# Patient Record
Sex: MALE | Race: WHITE | NOT HISPANIC OR LATINO | Employment: OTHER | URBAN - METROPOLITAN AREA
[De-identification: names, ages, dates, MRNs, and addresses within clinical notes are randomized per-mention and may not be internally consistent; named-entity substitution may affect disease eponyms.]

---

## 2017-01-05 ENCOUNTER — TELEPHONE (OUTPATIENT)
Dept: INTERNAL MEDICINE | Facility: CLINIC | Age: 62
End: 2017-01-05

## 2017-01-05 DIAGNOSIS — Z79.01 LONG TERM CURRENT USE OF ANTICOAGULANT THERAPY: ICD-10-CM

## 2017-01-05 DIAGNOSIS — Z95.2 AORTIC VALVE PROSTHESIS PRESENT: Primary | ICD-10-CM

## 2017-01-05 NOTE — TELEPHONE ENCOUNTER
Will likely require Lovenox bridging. This would require coordination with the anticoagulation nurse. Will forward note to them.   If he has Lovenox bridging, okay to hold warfarin 4-5 days prior to procedure. Needs to hold Lovenox AM of procedure.   Dose of Lovenox would be 120 mg sq q12 hrs.   Please advise pt.

## 2017-01-05 NOTE — TELEPHONE ENCOUNTER
Pt calls, he needs to have a piece of skin from the roof of his mouth on 1/20/17 by dentist (using local anesthetic) and asking if he should be stopping his Coumadin prior to this and if he would need to bridge with Lovenox.      Pt also was started on Amoxicillin 500mg TID by dentist for 7 days on 12/30/16 and wanting to know if he needs to be making any adjustments to Coumadin. His next INR appt is on 1/19/17.

## 2017-01-06 ENCOUNTER — TELEPHONE (OUTPATIENT)
Dept: NURSING | Facility: CLINIC | Age: 62
End: 2017-01-06

## 2017-01-09 DIAGNOSIS — E78.5 HYPERLIPIDEMIA LDL GOAL <130: Primary | ICD-10-CM

## 2017-01-09 DIAGNOSIS — E03.9 ACQUIRED HYPOTHYROIDISM: ICD-10-CM

## 2017-01-09 NOTE — TELEPHONE ENCOUNTER
Simvastatin     Last Written Prescription Date: 01/08/16  Last Fill Quantity: 90, # refills: 3  Last Office Visit with [facility] or [facility] prescribing provider: 11/14/16       CHOL      172   1/8/2016  HDL       48   1/8/2016  LDL       94   1/8/2016  TRIG      148   1/8/2016  CHOLHDLRATIO      4.0   12/24/2014    Labs showing if normal/abnormal  Lab Results   Component Value Date    CHOL 172 01/08/2016    TRIG 148 01/08/2016    HDL 48 01/08/2016    LDL 94 01/08/2016    VLDL 47* 12/24/2014    CHOLHDLRATIO 4.0 12/24/2014     Levothyroxine     Last Written Prescription Date: 01/08/16  Last Quantity: 90, # refills: 3  Last Office Visit with [facility], [facility] or [facility] prescribing provider: 11/14/16        TSH   Date Value Ref Range Status   01/08/2016 2.98 0.40 - 4.00 mU/L Final

## 2017-01-11 RX ORDER — SIMVASTATIN 40 MG
40 TABLET ORAL AT BEDTIME
Qty: 90 TABLET | Refills: 0 | Status: SHIPPED | OUTPATIENT
Start: 2017-01-11 | End: 2017-04-28

## 2017-01-11 RX ORDER — LEVOTHYROXINE SODIUM 150 UG/1
150 TABLET ORAL DAILY
Qty: 90 TABLET | Refills: 0 | Status: SHIPPED | OUTPATIENT
Start: 2017-01-11 | End: 2017-04-15

## 2017-01-11 NOTE — TELEPHONE ENCOUNTER
Medication is being filled for 1 time refill only due to:  Patient needs to be seen because physical due.   Amanda Wheeler RN

## 2017-01-13 ENCOUNTER — ANTICOAGULATION THERAPY VISIT (OUTPATIENT)
Dept: ANTICOAGULATION | Facility: CLINIC | Age: 62
End: 2017-01-13
Payer: COMMERCIAL

## 2017-01-13 DIAGNOSIS — I48.91 ATRIAL FIBRILLATION (H): ICD-10-CM

## 2017-01-13 DIAGNOSIS — Z95.2 AORTIC VALVE PROSTHESIS PRESENT: ICD-10-CM

## 2017-01-13 DIAGNOSIS — Z79.01 LONG-TERM (CURRENT) USE OF ANTICOAGULANTS: Primary | ICD-10-CM

## 2017-01-13 LAB — INR POINT OF CARE: 3.1 (ref 0.86–1.14)

## 2017-01-13 PROCEDURE — 36416 COLLJ CAPILLARY BLOOD SPEC: CPT

## 2017-01-13 PROCEDURE — 85610 PROTHROMBIN TIME: CPT | Mod: QW

## 2017-01-13 PROCEDURE — 99207 ZZC NO CHARGE NURSE ONLY: CPT

## 2017-01-13 NOTE — PROGRESS NOTES
ANTICOAGULATION FOLLOW-UP CLINIC VISIT    Patient Name:  Todd S Aschoff  Date:  1/13/2017  Contact Type:  Face to Face    SUBJECTIVE:     Patient Findings     Positives Medication Changes (antibiotic started on Wednesday this week for tooth infection.)    Comments Procedure 1/20/17 on the roof of his mouth.  Will need to hold warfarin for 4 days prior and use Lovenox bridging.  No lovenox the morning of the procedure.  Needs INR checked 1/19/17 per surgeon.               OBJECTIVE    INR PROTIME   Date Value Ref Range Status   01/13/2017 3.1* 0.86 - 1.14 Final       ASSESSMENT / PLAN  INR assessment THER    Recheck INR In: 6 DAYS    INR Location Clinic      Anticoagulation Summary as of 1/13/2017     INR goal 2.5-3.5   Selected INR 3.1 (1/13/2017)   Maintenance plan 7.5 mg (5 mg x 1.5) on Wed; 5 mg (5 mg x 1) all other days   Full instructions 1/16: Hold; 1/17: Hold; 1/18: Hold; Otherwise 7.5 mg on Wed; 5 mg all other days   Weekly total 37.5 mg   Plan last modified Amanda Wheeler RN (6/15/2016)   Next INR check 1/26/2017   Priority INR   Target end date     Indications   Long-term (current) use of anticoagulants [Z79.01] [Z79.01]  Aortic valve prosthesis present [Z95.2]  Atrial fibrillation (H) [I48.91]         Anticoagulation Episode Summary     INR check location     Preferred lab     Send INR reminders to [facility]    Comments       Anticoagulation Care Providers     Provider Role Specialty Phone number    Obdulio Ingram MD [facility] Internal [facility] [phone]            See the Encounter Report to view Anticoagulation Flowsheet and Dosing Calendar (Go to Encounters tab in chart review, and find the Anticoagulation Therapy Visit)    Dosage adjustment made based on physician directed care plan.    Amanda Wheeler, RN

## 2017-01-18 ENCOUNTER — HOSPITAL ENCOUNTER (EMERGENCY)
Facility: CLINIC | Age: 62
Discharge: HOME | End: 2017-01-18
Attending: EMERGENCY MEDICINE | Admitting: EMERGENCY MEDICINE
Payer: COMMERCIAL

## 2017-01-18 ENCOUNTER — TELEPHONE (OUTPATIENT)
Dept: ANTICOAGULATION | Facility: CLINIC | Age: 62
End: 2017-01-18

## 2017-01-18 VITALS
RESPIRATION RATE: 18 BRPM | TEMPERATURE: 98.8 F | OXYGEN SATURATION: 97 % | BODY MASS INDEX: 40.69 KG/M2 | DIASTOLIC BLOOD PRESSURE: 84 MMHG | HEIGHT: 73 IN | WEIGHT: 307 LBS | SYSTOLIC BLOOD PRESSURE: 114 MMHG

## 2017-01-18 DIAGNOSIS — N30.01 ACUTE CYSTITIS WITH HEMATURIA: ICD-10-CM

## 2017-01-18 LAB
ALBUMIN UR-MCNC: 100 MG/DL
ANION GAP SERPL CALCULATED.3IONS-SCNC: 6 MMOL/L (ref 3–14)
APPEARANCE UR: ABNORMAL
BASOPHILS # BLD AUTO: 0 10E9/L (ref 0–0.2)
BASOPHILS NFR BLD AUTO: 0.2 %
BILIRUB UR QL STRIP: NEGATIVE
BUN SERPL-MCNC: 15 MG/DL (ref 7–30)
CALCIUM SERPL-MCNC: 8.3 MG/DL (ref 8.5–10.1)
CHLORIDE SERPL-SCNC: 106 MMOL/L (ref 94–109)
CO2 SERPL-SCNC: 30 MMOL/L (ref 20–32)
COLOR UR AUTO: ABNORMAL
CREAT SERPL-MCNC: 1.02 MG/DL (ref 0.66–1.25)
DIFFERENTIAL METHOD BLD: ABNORMAL
EOSINOPHIL # BLD AUTO: 0.1 10E9/L (ref 0–0.7)
EOSINOPHIL NFR BLD AUTO: 0.4 %
ERYTHROCYTE [DISTWIDTH] IN BLOOD BY AUTOMATED COUNT: 13.3 % (ref 10–15)
GFR SERPL CREATININE-BSD FRML MDRD: 74 ML/MIN/1.7M2
GLUCOSE SERPL-MCNC: 94 MG/DL (ref 70–99)
GLUCOSE UR STRIP-MCNC: NEGATIVE MG/DL
HCT VFR BLD AUTO: 43.5 % (ref 40–53)
HGB BLD-MCNC: 15.2 G/DL (ref 13.3–17.7)
HGB UR QL STRIP: ABNORMAL
IMM GRANULOCYTES # BLD: 0 10E9/L (ref 0–0.4)
IMM GRANULOCYTES NFR BLD: 0.2 %
INR PPP: 1.75 (ref 0.86–1.14)
KETONES UR STRIP-MCNC: 5 MG/DL
LEUKOCYTE ESTERASE UR QL STRIP: ABNORMAL
LYMPHOCYTES # BLD AUTO: 1.1 10E9/L (ref 0.8–5.3)
LYMPHOCYTES NFR BLD AUTO: 8.9 %
MCH RBC QN AUTO: 32.5 PG (ref 26.5–33)
MCHC RBC AUTO-ENTMCNC: 34.9 G/DL (ref 31.5–36.5)
MCV RBC AUTO: 93 FL (ref 78–100)
MONOCYTES # BLD AUTO: 0.9 10E9/L (ref 0–1.3)
MONOCYTES NFR BLD AUTO: 7.3 %
NEUTROPHILS # BLD AUTO: 10.4 10E9/L (ref 1.6–8.3)
NEUTROPHILS NFR BLD AUTO: 83 %
NITRATE UR QL: NEGATIVE
NRBC # BLD AUTO: 0 10*3/UL
NRBC BLD AUTO-RTO: 0 /100
PH UR STRIP: 6 PH (ref 5–7)
PLATELET # BLD AUTO: 182 10E9/L (ref 150–450)
POTASSIUM SERPL-SCNC: 4 MMOL/L (ref 3.4–5.3)
RBC # BLD AUTO: 4.68 10E12/L (ref 4.4–5.9)
RBC #/AREA URNS AUTO: >182 /HPF (ref 0–2)
SODIUM SERPL-SCNC: 142 MMOL/L (ref 133–144)
SP GR UR STRIP: >1.05 (ref 1–1.03)
URN SPEC COLLECT METH UR: ABNORMAL
UROBILINOGEN UR STRIP-MCNC: NORMAL MG/DL (ref 0–2)
WBC # BLD AUTO: 12.5 10E9/L (ref 4–11)
WBC #/AREA URNS AUTO: >182 /HPF (ref 0–2)

## 2017-01-18 PROCEDURE — 87186 SC STD MICRODIL/AGAR DIL: CPT | Performed by: EMERGENCY MEDICINE

## 2017-01-18 PROCEDURE — 85025 COMPLETE CBC W/AUTO DIFF WBC: CPT | Performed by: EMERGENCY MEDICINE

## 2017-01-18 PROCEDURE — 85610 PROTHROMBIN TIME: CPT | Performed by: EMERGENCY MEDICINE

## 2017-01-18 PROCEDURE — 99284 EMERGENCY DEPT VISIT MOD MDM: CPT | Mod: 25

## 2017-01-18 PROCEDURE — 25000125 ZZHC RX 250: Performed by: EMERGENCY MEDICINE

## 2017-01-18 PROCEDURE — 87088 URINE BACTERIA CULTURE: CPT | Performed by: EMERGENCY MEDICINE

## 2017-01-18 PROCEDURE — 87086 URINE CULTURE/COLONY COUNT: CPT | Performed by: EMERGENCY MEDICINE

## 2017-01-18 PROCEDURE — 81003 URINALYSIS AUTO W/O SCOPE: CPT | Performed by: EMERGENCY MEDICINE

## 2017-01-18 PROCEDURE — 96365 THER/PROPH/DIAG IV INF INIT: CPT

## 2017-01-18 PROCEDURE — 80048 BASIC METABOLIC PNL TOTAL CA: CPT | Performed by: EMERGENCY MEDICINE

## 2017-01-18 RX ORDER — CEFTRIAXONE 1 G/1
1 INJECTION, POWDER, FOR SOLUTION INTRAMUSCULAR; INTRAVENOUS ONCE
Status: COMPLETED | OUTPATIENT
Start: 2017-01-18 | End: 2017-01-18

## 2017-01-18 RX ORDER — CEFUROXIME AXETIL 500 MG/1
500 TABLET ORAL 2 TIMES DAILY
Qty: 20 TABLET | Refills: 0 | Status: SHIPPED | OUTPATIENT
Start: 2017-01-18 | End: 2017-01-25

## 2017-01-18 RX ADMIN — CEFTRIAXONE 1 G: 1 INJECTION, POWDER, FOR SOLUTION INTRAMUSCULAR; INTRAVENOUS at 07:58

## 2017-01-18 ASSESSMENT — ENCOUNTER SYMPTOMS
CHILLS: 0
BLOOD IN STOOL: 0
FEVER: 0
FREQUENCY: 1
DYSURIA: 1
DIARRHEA: 0
NAUSEA: 1
HEMATURIA: 1

## 2017-01-18 NOTE — TELEPHONE ENCOUNTER
Pt calls, was at ED yesterday, given Ceftin for UTI.     1) Pt was taking amoxacillin for a tooth infection and is wondering if he should stop the Amoxacillin.    2) Pt has a procedure schedule for Friday to remove a growth from the roof of his mouth, was told it will bleed. Pt is asking if PCP thinks he should reschedule this procedure d/t current UTI? Pt has stopped Warfarin and taking Lovenox.     Please advise.

## 2017-01-18 NOTE — DISCHARGE INSTRUCTIONS
"  Blood in the Urine    Blood in the urine (\"hematuria\") has many possible causes. If it occurs after an injury (such as a car accident or fall), it is most often a sign of bruising to the kidney or bladder. Common medical causes of blood in the urine include urinary tract infection, kidney stone, inflammation, tumors, or certain other diseases of the kidney or bladder. Menstruation can cause blood to appear in the urine sample, although it is not coming from the urinary tract.  If only a trace amount of blood is present, it will show up on the urine test, even though the urine may be yellow and not pink or red. This may occur with any of the above conditions, as well as heavy exercise or high fever. In this case, your doctor may want to repeat the urine test on another day. This will show if the blood is still present. If so, then other tests can be done to find out the cause.  Home care  Follow these home care guidelines:    If your urine does not appear bloody (pink, brown or red) then you do not need to restrict your activity in any way.    If you can see blood in your urine, rest and avoid heavy exertion until your next exam. Do not use aspirin or anti-inflammatory medicine like ibuprofen (Motrin, Advil) or naproxen (Naprosyn, Aleve). These thin the blood and may increase bleeding.  Follow-up care  Follow up with your doctor or as advised by our staff. If you were injured and had blood in your urine, you should have a repeat urine test in 1 to 2 days. Contact your doctor or return to this facility for this test.  A radiologist will review any X-rays that were taken. We will notify you of any new findings that may affect your care.  When to seek medical care  Get prompt medical care if any of the following occur:    Bright red blood or blood clots in the urine (if a new symptom)    Weakness, dizziness or fainting    New groin, abdominal or back pain    Fever of 100.4 F (38 C) or higher, or as directed by your " health care provider    Repeated vomiting    Bleeding from nose, gums or easy bruising    3631-6837 [employer]. [address] [place] [id number]. All rights reserved. This information is not intended as a substitute for professional medical care. Always follow your healthcare professional's instructions.          Understanding Urinary Tract Infections (UTIs)  Most UTIs are caused by bacteria, although they may also be caused by viruses or fungi. Bacteria from the bowel are the most common source of infection. The infection may begin because of any of the following:    Sexual activity. During sex, germs can travel from the penis, vagina, or rectum into the urethra.     Germs on the skin outside the rectum may travel into the urethra. This is more common in women since the rectum and urethra are closer to each other than in men. Wiping from front to back after using the toilet and keeping the area clean can help prevent germs from getting to the urethra.    Blockage of urine flow through the urinary tract. If urine sits too long, germs may begin to grow out of control.      Parts of the urinary tract  The infection can occur in any part of the urinary tract.    The kidneys collect and store urine.    The ureters carry urine from the kidneys to the bladder.    The bladder holds urine until you are ready to let it out.    The urethra carries urine from the bladder out of the body. It is shorter in women, so bacteria can move through it more easily. The urethra is longer in men, so a UTI is less likely to reach the bladder or kidneys in men.    2973-6205 [employer]. [address] [place] [id number]. All rights reserved. This information is not intended as a substitute for professional medical care. Always follow your healthcare professional's instructions.

## 2017-01-18 NOTE — ED NOTES
Pt c/o urinary frequency started yesterday noticed blood in urine tonight. Reported to EMS that he has pain in his penis and he felt dizzy.  EMS gave 100mcg of Fentanyl and 4mg zofran. Pt stopped coumadin on Sunday and started lovenox 135mg BID for up coming dental procedure

## 2017-01-18 NOTE — ED NOTES
DR Spear reports that he would like the Pt's three way alfaro transitioned to a leg bag and this has been done. The Pt has been provided with education re: the use of his leg bag.

## 2017-01-18 NOTE — ED PROVIDER NOTES
History     Chief Complaint:  Urinary Frequency    HPI   Todd S Aschoff is a 61 year old male with history of BPH and atrial fibrillation currently on Lovenox who presents by EMS to the emergency department today for evaluation of urinary frequency, blood in urine, and penile pain with urination. He also complains of nausea. Patient notes that pain began last night, but blood was first noted this morning. He notes a history of bladder infections and currently follows with Dr Gonzales from urology. EMS administered 100 mcg of Fentanyl and 4mg Zofran. Patient recently Stopped coumadin on 01/15/2017 and started Lovenox 135mg BID for up coming dental procedure. Patient also reports recent dental infections and was recently taking Amoxicillin for this. He reports no fever, chills, or bowel movement symptoms. No other concerns were voiced at this time.     Allergies:  No Known Drug Allergies      Medications:    Zocor  Synthroid  Enoxaparin  Valium  Aricept  Coumadin  Flexeril  Lyrica  Zyrtec  Midrin  Myrbetriq  Senna-docusate  Desyrel  Rythmol  Effexor  Tenex  Astelin     Past Medical History:    Obesity  Headache  Allergy  Hypothyroidism  Atrial fibrillation  Sciatica  Hyperlipidemia  OA  Major depression  Alcohol dependence  BPH  Heart murmur  Coagulation disorder  Blood transfusion  Low back pain  Chronic pain    Past Surgical History:    TAA with graft  R hip replacement  Hip arthroplasty  Tonsillectomy  Decompression fusion cervical   Fusion cervical anterior  Decompression lumbar on level  Colonoscopy      Family History:    Father - MI, diabetes  Mother - cancer  Brother - HTN, Sleep apnea.    Sister - HTN    Social History:  The patient was accompanied to the ED by no one.  Smoking Status: Negative   Alcohol Use: Negative  Marital Status:   [2]     Review of Systems   Constitutional: Negative for fever and chills.   Gastrointestinal: Positive for nausea. Negative for diarrhea and blood in stool.  "  Genitourinary: Positive for dysuria, frequency, hematuria and penile pain.   All other systems reviewed and are negative.    Physical Exam     Patient Vitals for the past 24 hrs:   BP Temp Temp src Heart Rate Resp SpO2 Height Weight   01/18/17 0830 141/88 mmHg - - 73 18 95 % - -   01/18/17 0800 (!) 136/100 mmHg - - - - 92 % - -   01/18/17 0730 (!) 132/92 mmHg - - - - 97 % - -   01/18/17 0720 (!) 130/100 mmHg - - 70 18 95 % - -   01/18/17 0700 (!) 136/91 mmHg - - 70 18 96 % - -   01/18/17 0640 (!) 126/92 mmHg - - - - 95 % - -   01/18/17 0600 111/72 mmHg - - - - 97 % - -   01/18/17 0542 122/84 mmHg 98.8  F (37.1  C) Oral 92 20 91 % 1.854 m (6' 1\") (!) 139.254 kg (307 lb)      Physical Exam  Constitutional: Heavy set white male supine.  HENT: No signs of trauma.   Eyes: EOM are normal. Pupils are equal, round, and reactive to light.   Neck: Normal range of motion. No JVD present. No tracheal deviation present. No cervical adenopathy.  Cardiovascular: Regular rhythm.  Exam reveals no gallop and no friction rub. 1/6 early systolic murmur right sternal border with mechanical valve click  Pulmonary/Chest: Bilateral breath sounds normal. No wheezes, rhonchi or rales.   Abdominal: Soft. No tenderness. No rebound or guarding. 2 + femoral pulse.  : Normal penis. Bilateral descended testes, 3 way alfaro cath in place.   Musculoskeletal: No edema. No tenderness.   Lymphadenopathy: No lymphadenopathy.   Neurological: Alert and oriented to person, place, and time. Normal strength. Coordination normal.   Skin: Skin is warm and dry. No rash noted. No erythema.     Emergency Department Course     Laboratory:  Laboratory findings were communicated with the patient who voiced understanding of the findings.  CBC: WBC 12.5, HGB 15.2,    BMP: Calcium: 8.3, Creatinine 1.02  UA: Urine ketone: 5, Specific gravity urine: >1.050, Urine Blood: Large, Protein Albumin urine: 100, Leukocyte Esterase urine: Large, WBC: >182, RBC: " >182  INR: 1.75    Urine culture: Pending    Interventions:  0846 Rocephin 1 g in NS IV      Emergency Department Course:  Nursing notes and vitals reviewed.  I performed an exam of the patient as documented above.   IV was inserted and blood was drawn for laboratory testing, results above.    The patient provided a urine sample here in the emergency department. This was sent for laboratory testing, findings above.    0826: I spoke with Dr. Farfan of the urology service regarding patient's presentation, findings, and plan of care.  At 0840 the patient was rechecked and updated on urology recommendations    I discussed the treatment plan with the patient. They expressed understanding of this plan and consented to discharge. They will be discharged home with instructions for care and follow up. In addition, the patient will return to the emergency department if their symptoms persist, worsen, if new symptoms arise or if there is any concern.  All questions were answered.    I personally reviewed the laboratory results with the Patient and answered all related questions prior to discharge.    Impression & Plan      Medical Decision Making:  Todd S Aschoff is a 61 year old male who is currently on Lovenox with Atrial fibrillation and an aortic valve replacement, and he will have dental work so he is held on his Coumadin and put on Lovenox. He is also on Amoxicillin for dental problems. Last night he began having pain in his penis and having gross blood. Patient states that he does have a history of BPH and previous cystitis. On exam, he has a three way catheter in and he has been irrigated to clear. Urine shows a >182 WBC count, and is being cultured, is given IV Rocephin, and is started on Ceftin. I have contacted Dr Farfan on call for Dr Gonzales, who will leave the catheter in, begin the patient on Ceftin, and have him make a follow up with urology later this week. If he has increased pain, blood that causes  obstruction, or weakness he should return to the ED.    Diagnosis:    ICD-10-CM    1. Acute cystitis with hematuria N30.01      Disposition:   Discharge home; plan for follow up with Obdulio Ingram    Discharge Medications:  New Prescriptions    CEFUROXIME (CEFTIN) 500 MG TABLET    Take 1 tablet (500 mg) by mouth 2 times daily       Scribe Disclosure:  Nic DUARTE, am serving as a scribe at 7:08 AM on 1/18/2017 to document services personally performed by Carlo Spear MD, based on my observations and the provider's statements to me.     EMERGENCY DEPARTMENT        Carlo Spear MD  01/18/17 1105

## 2017-01-18 NOTE — TELEPHONE ENCOUNTER
May d/c amoxicillin and take complete course of Ceftin.   Okay to proceed with procedure on Friday as long as UTI symptoms have resolved (still needs to take complete course of Ceftin though).     Please advise pt.

## 2017-01-18 NOTE — ED AVS SNAPSHOT
"  Emergency Department    [address] [facility] [address]    Phone:  [phone]    Fax:  [phone]                                       Todd S Aschoff   MRN: [id number]    Department:   Emergency Department   Date of Visit:  1/18/2017           Patient Information     Date Of Birth          1955        Your diagnoses for this visit were:     Acute cystitis with hematuria        You were seen by Toan Calzada MD and Carlo Spear MD.      Follow-up Information     Schedule an appointment as soon as possible for a visit with Geraldo Gonzales MD.    Specialty:  Urology    Contact information:    [facility] UROLOGY  [address] [address] [phone]          Discharge Instructions         Blood in the Urine    Blood in the urine (\"hematuria\") has many possible causes. If it occurs after an injury (such as a car accident or fall), it is most often a sign of bruising to the kidney or bladder. Common medical causes of blood in the urine include urinary tract infection, kidney stone, inflammation, tumors, or certain other diseases of the kidney or bladder. Menstruation can cause blood to appear in the urine sample, although it is not coming from the urinary tract.  If only a trace amount of blood is present, it will show up on the urine test, even though the urine may be yellow and not pink or red. This may occur with any of the above conditions, as well as heavy exercise or high fever. In this case, your doctor may want to repeat the urine test on another day. This will show if the blood is still present. If so, then other tests can be done to find out the cause.  Home care  Follow these home care guidelines:    If your urine does not appear bloody (pink, brown or red) then you do not need to restrict your activity in any way.    If you can see blood in your urine, rest and avoid heavy exertion until your next exam. Do not use aspirin or " anti-inflammatory medicine like ibuprofen (Motrin, Advil) or naproxen (Naprosyn, Aleve). These thin the blood and may increase bleeding.  Follow-up care  Follow up with your doctor or as advised by our staff. If you were injured and had blood in your urine, you should have a repeat urine test in 1 to 2 days. Contact your doctor or return to this facility for this test.  A radiologist will review any X-rays that were taken. We will notify you of any new findings that may affect your care.  When to seek medical care  Get prompt medical care if any of the following occur:    Bright red blood or blood clots in the urine (if a new symptom)    Weakness, dizziness or fainting    New groin, abdominal or back pain    Fever of 100.4 F (38 C) or higher, or as directed by your health care provider    Repeated vomiting    Bleeding from nose, gums or easy bruising    3923-7694 [employer]. [address] [place] [address]. All rights reserved. This information is not intended as a substitute for professional medical care. Always follow your healthcare professional's instructions.          Understanding Urinary Tract Infections (UTIs)  Most UTIs are caused by bacteria, although they may also be caused by viruses or fungi. Bacteria from the bowel are the most common source of infection. The infection may begin because of any of the following:    Sexual activity. During sex, germs can travel from the penis, vagina, or rectum into the urethra.     Germs on the skin outside the rectum may travel into the urethra. This is more common in women since the rectum and urethra are closer to each other than in men. Wiping from front to back after using the toilet and keeping the area clean can help prevent germs from getting to the urethra.    Blockage of urine flow through the urinary tract. If urine sits too long, germs may begin to grow out of control.      Parts of the urinary tract  The infection can occur in any  part of the urinary tract.    The kidneys collect and store urine.    The ureters carry urine from the kidneys to the bladder.    The bladder holds urine until you are ready to let it out.    The urethra carries urine from the bladder out of the body. It is shorter in women, so bacteria can move through it more easily. The urethra is longer in men, so a UTI is less likely to reach the bladder or kidneys in men.    1555-0505 [employer]. [address] [place] [address], [place] [address]. All rights reserved. This information is not intended as a substitute for professional medical care. Always follow your healthcare professional's instructions.          Future Appointments        Provider Department Dept Phone Center    1/19/2017 10:30 AM [facility] [phone] [place]    1/25/2017 9:10 AM Geraldo Gonzales MD [facility] Urology [facility] [phone] [facility] [place]      24 Hour Appointment Hotline       To make an appointment at any [facility], call [phone] ([phone]). If you don't have a family doctor or clinic, we will help you find one. [facility] are conveniently located to serve the needs of you and your family.             Review of your medicines      START taking        Dose / Directions Last dose taken    cefUROXime 500 MG tablet   Commonly known as:  CEFTIN   Dose:  500 mg   Quantity:  20 tablet        Take 1 tablet (500 mg) by mouth 2 times daily   Refills:  0          Our records show that you are taking the medicines listed below. If these are incorrect, please call your family doctor or clinic.        Dose / Directions Last dose taken    acetaminophen 650 MG 8 hour tablet   Commonly known as:  ACETAMINOPHEN 8 HOUR   Dose:  650 mg   Quantity:  250 tablet        Take 650 mg by mouth 2 times daily   Refills:  3        azelastine 0.1 % spray   Commonly known as:  ASTELIN   Dose:  2 spray   Quantity:  1 Bottle         Spray 2 sprays in nostril 2 times daily as needed   Refills:  11        cetirizine 10 MG tablet   Commonly known as:  zyrTEC   Dose:  10 mg   Quantity:  90 tablet        Take 1 tablet (10 mg) by mouth daily as needed   Refills:  3        cyclobenzaprine 10 MG tablet   Commonly known as:  FLEXERIL   Dose:  10 mg   Quantity:  270 tablet        Take 1 tablet (10 mg) by mouth 3 times daily as needed for muscle spasms   Refills:  3        diazepam 5 MG tablet   Commonly known as:  VALIUM   Dose:  5 mg   Quantity:  270 tablet        Take 1 tablet (5 mg) by mouth every 8 hours as needed for anxiety or muscle spasms Maximum #270 every 90 days.   Refills:  0        donepezil 10 MG tablet   Commonly known as:  ARICEPT   Dose:  10 mg   Quantity:  90 tablet        Take 1 tablet (10 mg) by mouth At Bedtime   Refills:  0        enoxaparin 120 MG/0.8ML injection   Commonly known as:  enoxaparin   Dose:  1 mg/kg   Quantity:  20 Syringe        Inject 0.9 mLs (135 mg) Subcutaneous every 12 hours   Refills:  1        guanFACINE 1 MG tablet   Commonly known as:  TENEX   Dose:  1 mg   Quantity:  90 tablet        Take 1 tablet (1 mg) by mouth At Bedtime   Refills:  3        isometheptene-acetaminophen-dichloralphenazone -100 MG per capsule   Commonly known as:  MIDRIN   Dose:  1 capsule   Quantity:  30 capsule        Take 1 capsule by mouth 4 times daily as needed   Refills:  0        levothyroxine 150 MCG tablet   Commonly known as:  SYNTHROID/LEVOTHROID   Dose:  150 mcg   Quantity:  90 tablet        Take 1 tablet (150 mcg) by mouth daily   Refills:  0        mirabegron 50 MG 24 hr tablet   Commonly known as:  MYRBETRIQ   Dose:  50 mg   Quantity:  90 tablet        Take 1 tablet (50 mg) by mouth daily   Refills:  3        pregabalin 100 MG capsule   Commonly known as:  LYRICA   Dose:  100 mg   Quantity:  270 capsule        Take 1 capsule (100 mg) by mouth 3 times daily Do not give if somnolent/sedated.   Refills:  3         propafenone 150 MG Tabs tablet   Commonly known as:  RYTHMOL   Dose:  150 mg   Quantity:  270 tablet        Take 1 tablet (150 mg) by mouth every 8 hours   Refills:  3        senna-docusate 8.6-50 MG per tablet   Commonly known as:  SENOKOT-S;PERICOLACE   Dose:  1 tablet   Quantity:  60 tablet        Take 1 tablet by mouth 2 times daily as needed for constipation   Refills:  11        simvastatin 40 MG tablet   Commonly known as:  ZOCOR   Dose:  40 mg   Quantity:  90 tablet        Take 1 tablet (40 mg) by mouth At Bedtime   Refills:  0        traZODone 100 MG tablet   Commonly known as:  DESYREL   Dose:  100 mg   Quantity:  90 tablet        Take 1 tablet (100 mg) by mouth nightly as needed for sleep   Refills:  3        venlafaxine 150 MG Tb24 24 hr tablet   Commonly known as:  EFFEXOR-ER   Dose:  150 mg   Quantity:  90 each        Take 1 tablet (150 mg) by mouth daily (with breakfast)   Refills:  3        warfarin 5 MG tablet   Commonly known as:  COUMADIN   Quantity:  96 tablet        Take 1 tab (5 mg) daily except 1 1/2 tab (7.5 mg) on Wednesdays or as instructed by INR clinic.   Refills:  0                Prescriptions were sent or printed at these locations (1 Prescription)                   Other Prescriptions                Printed at Department/Unit printer (1 of 1)         cefUROXime (CEFTIN) 500 MG tablet                Procedures and tests performed during your visit     Basic metabolic panel    CBC with platelets + differential    INR    UA reflex to Microscopic    Urine Culture Aerobic Bacterial      Orders Needing Specimen Collection     None      Pending Results     Date and Time Order Name Status Description    1/18/2017 0741 Urine Culture Aerobic Bacterial In process             Pending Culture Results     Date and Time Order Name Status Description    1/18/2017 0741 Urine Culture Aerobic Bacterial In process        Test Results from your hospital stay           1/18/2017  7:02 AM - Interface,  Flexilab Results      Component Results     Component Value Ref Range & Units Status    Color Urine Dark Red  Final    Appearance Urine Cloudy  Final    Glucose Urine Negative NEG mg/dL Final    Bilirubin Urine Negative NEG Final    Ketones Urine 5 (A) NEG mg/dL Final    Specific Gravity Urine >1.050 (H) 1.003 - 1.035 Final    Blood Urine Large (A) NEG Final    pH Urine 6.0 5.0 - 7.0 pH Final    Protein Albumin Urine 100 (A) NEG mg/dL Final    Urobilinogen mg/dL Normal 0.0 - 2.0 mg/dL Final    Nitrite Urine Negative NEG Final    Leukocyte Esterase Urine Large (A) NEG Final    Source Midstream Urine  Final    WBC Urine >182 (H) 0 - 2 /HPF Final    RBC Urine >182 (H) 0 - 2 /HPF Final         1/18/2017  6:25 AM - Interface, Flexilab Results      Component Results     Component Value Ref Range & Units Status    WBC 12.5 (H) 4.0 - 11.0 10e9/L Final    RBC Count 4.68 4.4 - 5.9 10e12/L Final    Hemoglobin 15.2 13.3 - 17.7 g/dL Final    Hematocrit 43.5 40.0 - 53.0 % Final    MCV 93 78 - 100 fl Final    MCH 32.5 26.5 - 33.0 pg Final    MCHC 34.9 31.5 - 36.5 g/dL Final    RDW 13.3 10.0 - 15.0 % Final    Platelet Count 182 150 - 450 10e9/L Final    Diff Method Automated Method  Final    % Neutrophils 83.0 % Final    % Lymphocytes 8.9 % Final    % Monocytes 7.3 % Final    % Eosinophils 0.4 % Final    % Basophils 0.2 % Final    % Immature Granulocytes 0.2 % Final    Nucleated RBCs 0 0 /100 Final    Absolute Neutrophil 10.4 (H) 1.6 - 8.3 10e9/L Final    Absolute Lymphocytes 1.1 0.8 - 5.3 10e9/L Final    Absolute Monocytes 0.9 0.0 - 1.3 10e9/L Final    Absolute Eosinophils 0.1 0.0 - 0.7 10e9/L Final    Absolute Basophils 0.0 0.0 - 0.2 10e9/L Final    Abs Immature Granulocytes 0.0 0 - 0.4 10e9/L Final    Absolute Nucleated RBC 0.0  Final         1/18/2017  6:33 AM - Interface, Flexilab Results      Component Results     Component Value Ref Range & Units Status    INR 1.75 (H) 0.86 - 1.14 Final         1/18/2017  6:44 AM -  Interface, Flexilab Results      Component Results     Component Value Ref Range & Units Status    Sodium 142 133 - 144 mmol/L Final    Potassium 4.0 3.4 - 5.3 mmol/L Final    Chloride 106 94 - 109 mmol/L Final    Carbon Dioxide 30 20 - 32 mmol/L Final    Anion Gap 6 3 - 14 mmol/L Final    Glucose 94 70 - 99 mg/dL Final    Urea Nitrogen 15 7 - 30 mg/dL Final    Creatinine 1.02 0.66 - 1.25 mg/dL Final    GFR Estimate 74 >60 mL/min/1.7m2 Final    Non  GFR Calc    GFR Estimate If Black >90   GFR Calc   >60 mL/min/1.7m2 Final    Calcium 8.3 (L) 8.5 - 10.1 mg/dL Final         1/18/2017  8:17 AM - Interface, Flexilab Results                Clinical Quality Measure: Blood Pressure Screening     Your blood pressure was checked while you were in the emergency department today. The last reading we obtained was  BP: 141/88 mmHg . Please read the guidelines below about what these numbers mean and what you should do about them.  If your systolic blood pressure (the top number) is less than 120 and your diastolic blood pressure (the bottom number) is less than 80, then your blood pressure is normal. There is nothing more that you need to do about it.  If your systolic blood pressure (the top number) is 120-139 or your diastolic blood pressure (the bottom number) is 80-89, your blood pressure may be higher than it should be. You should have your blood pressure rechecked within a year by a primary care provider.  If your systolic blood pressure (the top number) is 140 or greater or your diastolic blood pressure (the bottom number) is 90 or greater, you may have high blood pressure. High blood pressure is treatable, but if left untreated over time it can put you at risk for heart attack, stroke, or kidney failure. You should have your blood pressure rechecked by a primary care provider within the next 4 weeks.  If your provider in the emergency department today gave you specific instructions to  "follow-up with your doctor or provider even sooner than that, you should follow that instruction and not wait for up to 4 weeks for your follow-up visit.        Thank you for choosing [facility]       Thank you for choosing [facility] for your care. Our goal is always to provide you with excellent care. Hearing back from our patients is one way we can continue to improve our services. Please take a few minutes to complete the written survey that you may receive in the mail after you visit with us. Thank you!        [employer] Information     [email or web address] lets you send messages to your doctor, view your test results, renew your prescriptions, schedule appointments and more. To sign up, go to [email or web address] . Click on \"Log in\" on the left side of the screen, which will take you to the Welcome page. Then click on \"Sign up Now\" on the right side of the page.     You will be asked to enter the access code listed below, as well as some personal information. Please follow the directions to create your username and password.     Your access code is: [email or web address]  Expires: 2017  3:49 PM     Your access code will  in 90 days. If you need help or a new code, please call your [facility] clinic or [phone].        Care EveryWhere ID     This is your Care EveryWhere ID. This could be used by other organizations to access your [facility] medical records  [id number]        After Visit Summary       This is your record. Keep this with you and show to your community pharmacist(s) and doctor(s) at your next visit.                  "

## 2017-01-18 NOTE — ED AVS SNAPSHOT
Emergency Department    [address] [place] [address]    Phone:  [phone]    Fax:  [phone]                                       Todd S Aschoff   MRN: [id number]    Department:   Emergency Department   Date of Visit:  1/18/2017           After Visit Summary Signature Page     I have received my discharge instructions, and my questions have been answered. I have discussed any challenges I see with this plan with the nurse or doctor.    ..........................................................................................................................................  Patient/Patient Representative Signature      ..........................................................................................................................................  Patient Representative Print Name and Relationship to Patient    ..................................................               ................................................  Date                                            Time    ..........................................................................................................................................  Reviewed by Signature/Title    ...................................................              ..............................................  Date                                                            Time

## 2017-01-18 NOTE — ED NOTES
Bed: ED20  Expected date: 1/18/17  Expected time: 5:30 AM  Means of arrival: Ambulance  Comments:  PARVIN 1844 61M UTI symtoms

## 2017-01-19 ENCOUNTER — HOSPITAL ENCOUNTER (EMERGENCY)
Facility: CLINIC | Age: 62
Discharge: HOME | End: 2017-01-19
Attending: EMERGENCY MEDICINE | Admitting: EMERGENCY MEDICINE
Payer: COMMERCIAL

## 2017-01-19 ENCOUNTER — APPOINTMENT (OUTPATIENT)
Dept: CT IMAGING | Facility: CLINIC | Age: 62
End: 2017-01-19
Attending: EMERGENCY MEDICINE
Payer: COMMERCIAL

## 2017-01-19 VITALS
TEMPERATURE: 99.4 F | SYSTOLIC BLOOD PRESSURE: 123 MMHG | RESPIRATION RATE: 16 BRPM | DIASTOLIC BLOOD PRESSURE: 91 MMHG | BODY MASS INDEX: 41.58 KG/M2 | HEART RATE: 80 BPM | HEIGHT: 72 IN | OXYGEN SATURATION: 100 % | WEIGHT: 307 LBS

## 2017-01-19 DIAGNOSIS — N32.89 BLADDER SPASM: ICD-10-CM

## 2017-01-19 LAB
ANION GAP SERPL CALCULATED.3IONS-SCNC: 5 MMOL/L (ref 3–14)
BACTERIA SPEC CULT: ABNORMAL
BASOPHILS # BLD AUTO: 0 10E9/L (ref 0–0.2)
BASOPHILS NFR BLD AUTO: 0.3 %
BUN SERPL-MCNC: 15 MG/DL (ref 7–30)
CALCIUM SERPL-MCNC: 8.5 MG/DL (ref 8.5–10.1)
CHLORIDE SERPL-SCNC: 103 MMOL/L (ref 94–109)
CO2 SERPL-SCNC: 31 MMOL/L (ref 20–32)
CREAT SERPL-MCNC: 0.99 MG/DL (ref 0.66–1.25)
DIFFERENTIAL METHOD BLD: ABNORMAL
EOSINOPHIL # BLD AUTO: 0.1 10E9/L (ref 0–0.7)
EOSINOPHIL NFR BLD AUTO: 0.7 %
ERYTHROCYTE [DISTWIDTH] IN BLOOD BY AUTOMATED COUNT: 13.2 % (ref 10–15)
GFR SERPL CREATININE-BSD FRML MDRD: 77 ML/MIN/1.7M2
GLUCOSE SERPL-MCNC: 115 MG/DL (ref 70–99)
HCT VFR BLD AUTO: 43.6 % (ref 40–53)
HGB BLD-MCNC: 15 G/DL (ref 13.3–17.7)
IMM GRANULOCYTES # BLD: 0.1 10E9/L (ref 0–0.4)
IMM GRANULOCYTES NFR BLD: 0.4 %
INR PPP: 1.41 (ref 0.86–1.14)
LYMPHOCYTES # BLD AUTO: 1.2 10E9/L (ref 0.8–5.3)
LYMPHOCYTES NFR BLD AUTO: 10.6 %
Lab: ABNORMAL
MCH RBC QN AUTO: 31.8 PG (ref 26.5–33)
MCHC RBC AUTO-ENTMCNC: 34.4 G/DL (ref 31.5–36.5)
MCV RBC AUTO: 93 FL (ref 78–100)
MICRO REPORT STATUS: ABNORMAL
MICROORGANISM SPEC CULT: ABNORMAL
MONOCYTES # BLD AUTO: 0.9 10E9/L (ref 0–1.3)
MONOCYTES NFR BLD AUTO: 7.8 %
NEUTROPHILS # BLD AUTO: 9.3 10E9/L (ref 1.6–8.3)
NEUTROPHILS NFR BLD AUTO: 80.2 %
NRBC # BLD AUTO: 0 10*3/UL
NRBC BLD AUTO-RTO: 0 /100
PLATELET # BLD AUTO: 192 10E9/L (ref 150–450)
POTASSIUM SERPL-SCNC: 3.9 MMOL/L (ref 3.4–5.3)
RBC # BLD AUTO: 4.71 10E12/L (ref 4.4–5.9)
SODIUM SERPL-SCNC: 139 MMOL/L (ref 133–144)
SPECIMEN SOURCE: ABNORMAL
WBC # BLD AUTO: 11.6 10E9/L (ref 4–11)

## 2017-01-19 PROCEDURE — 80048 BASIC METABOLIC PNL TOTAL CA: CPT | Performed by: EMERGENCY MEDICINE

## 2017-01-19 PROCEDURE — 25000125 ZZHC RX 250: Performed by: EMERGENCY MEDICINE

## 2017-01-19 PROCEDURE — 76705 ECHO EXAM OF ABDOMEN: CPT

## 2017-01-19 PROCEDURE — 85610 PROTHROMBIN TIME: CPT | Performed by: EMERGENCY MEDICINE

## 2017-01-19 PROCEDURE — 96375 TX/PRO/DX INJ NEW DRUG ADDON: CPT

## 2017-01-19 PROCEDURE — 99285 EMERGENCY DEPT VISIT HI MDM: CPT | Mod: 25

## 2017-01-19 PROCEDURE — 96374 THER/PROPH/DIAG INJ IV PUSH: CPT

## 2017-01-19 PROCEDURE — 85025 COMPLETE CBC W/AUTO DIFF WBC: CPT | Performed by: EMERGENCY MEDICINE

## 2017-01-19 PROCEDURE — 74177 CT ABD & PELVIS W/CONTRAST: CPT

## 2017-01-19 PROCEDURE — 96376 TX/PRO/DX INJ SAME DRUG ADON: CPT

## 2017-01-19 PROCEDURE — 25000132 ZZH RX MED GY IP 250 OP 250 PS 637: Performed by: EMERGENCY MEDICINE

## 2017-01-19 PROCEDURE — 25500064 ZZH RX 255 OP 636: Performed by: EMERGENCY MEDICINE

## 2017-01-19 PROCEDURE — 25000128 H RX IP 250 OP 636: Performed by: EMERGENCY MEDICINE

## 2017-01-19 RX ORDER — ONDANSETRON 2 MG/ML
4 INJECTION INTRAMUSCULAR; INTRAVENOUS ONCE
Status: COMPLETED | OUTPATIENT
Start: 2017-01-19 | End: 2017-01-19

## 2017-01-19 RX ORDER — OXYBUTYNIN CHLORIDE 5 MG/1
10 TABLET ORAL ONCE
Status: COMPLETED | OUTPATIENT
Start: 2017-01-19 | End: 2017-01-19

## 2017-01-19 RX ORDER — HYDROCODONE BITARTRATE AND ACETAMINOPHEN 5; 325 MG/1; MG/1
1 TABLET ORAL EVERY 6 HOURS PRN
Qty: 15 TABLET | Refills: 0 | Status: SHIPPED | OUTPATIENT
Start: 2017-01-19 | End: 2017-04-28

## 2017-01-19 RX ORDER — HYDROMORPHONE HYDROCHLORIDE 1 MG/ML
0.5 INJECTION, SOLUTION INTRAMUSCULAR; INTRAVENOUS; SUBCUTANEOUS
Status: COMPLETED | OUTPATIENT
Start: 2017-01-19 | End: 2017-01-19

## 2017-01-19 RX ORDER — IOPAMIDOL 755 MG/ML
500 INJECTION, SOLUTION INTRAVASCULAR ONCE
Status: COMPLETED | OUTPATIENT
Start: 2017-01-19 | End: 2017-01-19

## 2017-01-19 RX ADMIN — ONDANSETRON 4 MG: 2 INJECTION INTRAMUSCULAR; INTRAVENOUS at 18:29

## 2017-01-19 RX ADMIN — HYDROMORPHONE HYDROCHLORIDE 0.5 MG: 1 INJECTION, SOLUTION INTRAMUSCULAR; INTRAVENOUS; SUBCUTANEOUS at 19:40

## 2017-01-19 RX ADMIN — HYDROMORPHONE HYDROCHLORIDE 0.5 MG: 1 INJECTION, SOLUTION INTRAMUSCULAR; INTRAVENOUS; SUBCUTANEOUS at 21:17

## 2017-01-19 RX ADMIN — OXYBUTYNIN CHLORIDE 10 MG: 5 TABLET ORAL at 20:08

## 2017-01-19 RX ADMIN — HYDROMORPHONE HYDROCHLORIDE 0.5 MG: 1 INJECTION, SOLUTION INTRAMUSCULAR; INTRAVENOUS; SUBCUTANEOUS at 18:27

## 2017-01-19 RX ADMIN — IOPAMIDOL 100 ML: 755 INJECTION, SOLUTION INTRAVENOUS at 19:46

## 2017-01-19 RX ADMIN — SODIUM CHLORIDE 65 ML: 9 INJECTION, SOLUTION INTRAVENOUS at 19:46

## 2017-01-19 ASSESSMENT — ENCOUNTER SYMPTOMS
HEMATURIA: 1
FREQUENCY: 1
FEVER: 0
NAUSEA: 1
DIAPHORESIS: 1
DYSURIA: 1
CHILLS: 0

## 2017-01-19 NOTE — ED PROVIDER NOTES
History     Chief Complaint:  Suprapubic pain    HPI   Todd S Aschoff is a 61 year old male with a complicated past medical history including BPH and atrial fibrillation on lovenox who presents for evaluation of suprapubic pain. The patient states that he was seen at [facility] yesterday with presenting symptoms of dysuria, increased frequency and hematuria of 1 day duration. In the ED, he had a three-way catheter placed and urinalysis was positive with >182 WBC/HPF and a large amount of leukocyte esterase. The patient was diagnosed with acute cystitis and provided IV rocephin in the ED and discharged with a prescription for ceftin.  Since discharge, the patient has continued to have severe intermittent suprapubic pain and hematuria. He admits to diaphoresis. He denies fever, chills, cough and back pain. The patient thinks the catheter may be leaking as he has found his undergarments to be damp. The patient already has a scheduled appointment with his urologist regarding his prostate.    Allergies:  No known drug allergies.     Medications:    Ceftin  Zocor  Synthroid  Enoxaparin  Valium  Aricept  Flexeril  Lyrica  Zyrtec  Mirin  Myrbetriq  Senokot  Desyrel  Rythmol  Effexor  Tenex  Astelin  Aceaminophen     Past Medical History:    Hypothyroidism  Atrial fibrillation  Aortic dissection   Hyperlipidemia  Osteoarthritis  Aortic valve prosthesis  Major depression  Alcohol dependence  BPH     Past Surgical History:    Repair of TAA with graft  Right and left hip replacement  Aortic valve replacement  Cholecystectomy   Tonsillectomy  Decompression, cervical C4-6 fusion  Decompression L4-5    Family History:    Cerebrovascular disease (father)  Lymphoma (mother)  Hypertension (brother, sister)  ROSALIE (brother)     Social History:  Marital Status:   Presents to the ED with his wife  Tobacco Use: no  Alcohol Use: no  PCP: Obdulio Ingram MD     Review of Systems   Constitutional: Positive for diaphoresis.  Negative for fever and chills.   Gastrointestinal: Positive for nausea.   Genitourinary: Positive for dysuria, frequency and hematuria.        Positive for suprapubic pain   All other systems reviewed and are negative.      Physical Exam   First Vitals:  BP: 121/81 mmHg  Heart Rate: 80   Resp: 16  Temp: 99.4  F (oral)       Physical Exam     Gen: Pleasant, intermittent spasms of severe pain.    Eye:   Pupils are equal, round, and reactive.     Sclera non-injected.    ENT:   Moist mucus membranes.     Normal tongue.    Oropharynx without lesions.    Cardiac:     Normal rate and regular rhythm.    No murmurs, gallops, or rubs.    Pulmonary:     Clear to auscultation bilaterally.    No wheezes, rales, or rhonchi.    Abdomen:     Normal active bowel sounds.     Abdomen is soft and non-distended, with suprapubic tenderness.    : Zimmer catheter from the meatus, draining yellow-appearing urine with few blood clots.    Musculoskeletal:     Normal movement of all extremities without evidence for deficit.    Extremities:    No edema.    Skin:   Warm and dry.    Neurologic:    Non-focal exam without asymmetric weakness or numbness.    Normal tone    Psychiatric:     Normal affect with appropriate interaction with examiner.      Emergency Department Course     Imaging:    POC US Abdomen Limited    PERFORMED BY: Dr. Almita Salas  INDICATIONS:  Suprapubic pain  PROBE: Low frequency convex probe  BODY LOCATION:  Abdomen  FINDINGS:  Visualization of the bladder in longitudinal and  transverse views demonstrated a contracted state. The balloon  appears to be in the correct location.  INTERPRETATION:   The evaluation was normal without evidence of  obstruction or mass.  No bladder distention noted.  IMAGE DOCUMENTATION: Images were archived to hard drive    CT Abdomen/Pelvis, IV contrast only  No acute process demonstrated within the abdomen and  pelvis.  Report per radiology.    Radiographic findings were communicated with  the patient who voiced understanding of the findings.    Laboratory:  CBC:  WBC 11.6 (H), HGB 15.0,   BMP: Glucose 115 (H), otherwise WNL (Creatinine 0.99)    Interventions:  (1827) Dilaudid, 0.5 mg , IV  (1829) Zofran, 4 mg, IV injection  (`1940) Dilaudid, 0.5 mg, IV  (2008) Ditropan, 10 mg, PO  (2117) Dilaudid, 0.5 mg, IV    Emergency Department Course:  Nursing notes and vitals reviewed.  I performed an exam of the patient as documented above. GCS 15.    A peripheral IV was established. Blood was drawn from the patient. This was sent for laboratory testing, findings above.     The patient had a bedside abdominal US and sent for a CT abdomen pelvis while in the emergency department, findings above.     2240: Patient was to have bladder filled prior to discontinuation of catheter, however, catheter was simply pulled.  He was able to urinate a small amount.  Bladder spasms have resolved.    Findings and plan explained to the patient. Patient discharged home with instructions regarding supportive care, medications, and reasons to return. The importance of close follow-up was reviewed. The patient was prescribed norco.    I personally reviewed the laboratory results with the patient and answered all related questions prior to discharge.       Impression & Plan      Medical Decision Making:  Todd Aschoff is a 61 year male with a history of BPH and atrial fibrillation as well as prosthetic heart valve who presents today with severe, worsening lower abdominal pain. The patient has a benign abdominal exam, but does have intermittent spasms of pain. Differential diagnosis included worsneing UTI, emphysematous bladder, diverticulitis and bladder spasm. CT fortunately is negative for any of the above. Overall, his symptoms seem consistent with bladder spasm. He continues to have severe symptoms in the ED. At this point, I think the catheter can be discontinued given that he no longer has hematuria.  Clinically, he  seems improved with his current antibiotic.  I reviewed his culture, and it appears the e. Coli is sensitive to the medication. He will follow-up with his PCP and will return to the ED for worsening pain, inability to urinate, gross hematuria or any other concerns.       Diagnosis:    ICD-10-CM    1. Bladder spasm N32.89        Disposition:  discharged to home    Discharge Medications:  New Prescriptions    HYDROCODONE-ACETAMINOPHEN (NORCO) 5-325 MG PER TABLET    Take 1 tablet by mouth every 6 hours as needed for pain       I, oJsé Scott, am serving as a scribe on 1/19/2017 at 5:42 PM to personally document services performed by Dr. Maritza MD based on my observations and the provider's statements to me.     1/19/2017   [facility] EMERGENCY DEPARTMENT        Almita Salas MD  01/20/17 7251

## 2017-01-19 NOTE — ED AVS SNAPSHOT
[facility] Emergency Department    [address]    [facility] [address]    Phone:  [phone]    Fax:  [phone]                                       Todd S Aschoff   MRN: [id number]    Department:  [facility] Emergency Department   Date of Visit:  1/19/2017           Patient Information     Date Of Birth          1955        Your diagnoses for this visit were:     Bladder spasm        You were seen by Almita Salas MD.      Follow-up Information     Follow up with [facility] Emergency Department.    Specialty:  EMERGENCY MEDICINE    Why:  immediately , If symptoms worsen    Contact information:    [address]  [facility] [id number] [phone]        Follow up with Obdulio Ingram MD In 3 days.    Specialty:  Internal Medicine    Why:  for a recheck of your symptoms    Contact information:    [facility]  [address]  [facility] [address]  [phone]          Discharge Instructions       Discharge Instructions  Abdominal Pain    Abdominal pain can be caused by many things. Your evaluation today does not show the exact cause for your pain. Your doctor today has decided that it is unlikely your pain is due to a life threatening problem, or a problem requiring surgery or hospital admission. Sometimes those problems cannot be found right away, so it is very important that you follow up as directed.  Sometimes only the changes which occur over time allow the cause of your pain to be found.    Return to the Emergency Department for a recheck in 8-12 hours if your pain continues.  If your pain gets worse, changes in location, or feels different, return to the Emergency Department right away.    ADULTS:  Return to the Emergency Department right away if:      You get an oral temperature above 102oF or as directed by your doctor.    You have blood in your stools (bright red or black, tarry stools).    You keep  throwing up or can t drink liquids.    You see blood when you throw up.    You can t have a bowel movement or you can t pass gas.    Your stomach gets bloated or bigger.    Your skin or the whites of your eyes look yellow.    You faint.    You have bloody, frequent or painful urination.    You have new symptoms or anything that worries you.    CHILDREN:  Return to the Emergency Department right away if your child has any of the above-listed symptoms or the following:      Pushes your hand away or screams/cries when his/her belly is touched.    You notice your child is very fussy or weak.    Your child is very tired and is too tired to eat or drink.    Your child is dehydrated.  Signs of dehydration can be:  o Your infant has had no wet diapers in 4-5 hours.  o Your older child has not passed urine in 6-8 hours.  o Your infant or child starts to have dry mouth and lips, or no saliva or tears.    PREGNANT WOMEN:  Return to the Emergency Department right away if you have any of the above-listed symptoms or the following:      You have bleeding, leaking fluid or passing tissue from the vagina.    You have worse pain or cramping, or pain in your shoulder or back.    You have vomiting that will not stop.    You have painful or bloody urination.    You have a temperature of 100oF or more.    Your baby is not moving as much as usual.    You faint.    You get a bad headache with or without eye problems and abdominal pain.    You have a convulsion or seizure.    You have unusual discharge from your vagina and abdominal pain.    Abdominal pain is pretty common during pregnancy.  Your pain may or may not be related to your pregnancy. You should follow-up closely with your OB doctor so they can evaluate you and your baby.  Until you follow-up with your regular doctor, do the following:       Avoid sex and do not put anything in your vagina.    Drink clear fluids.    Only take medications approved by your doctor.    MORE  "INFORMATION:    Appendicitis:  A possible cause of abdominal pain in any person who still has their appendix is acute appendicitis. Appendicitis is often hard to diagnose.  Testing does not always rule out early appendicitis or other causes of abdominal pain. Close follow-up with your doctor and re-evaluations may be needed to figure out the reason for your abdominal pain.    Follow-up:  It is very important that you make an appointment with your clinic and go to the appointment.  If you do not follow-up with your primary doctor, it may result in missing an important development which could result in permanent injury or disability and/or lasting pain.  If there is any problem keeping your appointment, call your doctor or return to the Emergency Department.    Medications:  Take your medications as directed by your doctor today.  Before using over-the-counter medications, ask your doctor and make sure to take the medications as directed.  If you have any questions about medications, ask your doctor.    Diet:  Resume your normal diet as much as possible, but do not eat fried, fatty or spicy foods while you have pain.  Do not drink alcohol or have caffeine.  Do not smoke tobacco.    Probiotics: If you have been given an antibiotic, you may want to also take a probiotic pill or eat yogurt with live cultures. Probiotics have \"good bacteria\" to help your intestines stay healthy. Studies have shown that probiotics help prevent diarrhea and other intestine problems (including C. diff infection) when you take antibiotics. You can buy these without a prescription in the pharmacy section of the store.     If you were given a prescription for medicine here today, be sure to read all of the information (including the package insert) that comes with your prescription.  This will include important information about the medicine, its side effects, and any warnings that you need to know about.  The pharmacist who fills the " prescription can provide more information and answer questions you may have about the medicine.  If you have questions or concerns that the pharmacist cannot address, please call or return to the Emergency Department.         Opioid Medication Information    Pain medications are among the most commonly prescribed medicines, so we are including this information for all our patients. If you did not receive pain medication or get a prescription for pain medicine, you can ignore it.     You may have been given a prescription for an opioid (narcotic) pain medicine and/or have received a pain medicine while here in the Emergency Department. These medicines can make you drowsy or impaired. You must not drive, operate dangerous equipment, or engage in any other dangerous activities while taking these medications. If you drive while taking these medications, you could be arrested for DUI, or driving under the influence. Do not drink any alcohol while you are taking these medications.     Opioid pain medications can cause addiction. If you have a history of chemical dependency of any type, you are at a higher risk of becoming addicted to pain medications.  Only take these prescribed medications to treat your pain when all other options have been tried. Take it for as short a time and as few doses as possible. Store your pain pills in a secure place, as they are frequently stolen and provide a dangerous opportunity for children or visitors in your house to start abusing these powerful medications. We will not replace any lost or stolen medicine.  As soon as your pain is better, you should flush all your remaining medication.     Many prescription pain medications contain Tylenol  (acetaminophen), including Vicodin , Tylenol #3 , Norco , Lortab , and Percocet .  You should not take any extra pills of Tylenol  if you are using these prescription medications or you can get very sick.  Do not ever take more than 3000 mg of  acetaminophen in any 24 hour period.    All opioids tend to cause constipation. Drink plenty of water and eat foods that have a lot of fiber, such as fruits, vegetables, prune juice, apple juice and high fiber cereal.  Take a laxative if you don t move your bowels at least every other day. Miralax , Milk of Magnesia, Colace , or Senna  can be used to keep you regular.      Remember that you can always come back to the Emergency Department if you are not able to see your regular doctor in the amount of time listed above, if you get any new symptoms, or if there is anything that worries you.          Future Appointments        Provider Department Dept Phone Center    1/23/2017 8:00 AM [facility] [phone] [place]    1/25/2017 9:10 AM Geraldo Gonzales MD [facility]rology [facility] [phone]  JALYN Elco      24 Hour Appointment Hotline       To make an appointment at any [facility], call [phone] ([phone]). If you don't have a family doctor or clinic, we will help you find one. [facility] are conveniently located to serve the needs of you and your family.             Review of your medicines      START taking        Dose / Directions Last dose taken    HYDROcodone-acetaminophen 5-325 MG per tablet   Commonly known as:  NORCO   Dose:  1 tablet   Quantity:  15 tablet        Take 1 tablet by mouth every 6 hours as needed for pain   Refills:  0          Our records show that you are taking the medicines listed below. If these are incorrect, please call your family doctor or clinic.        Dose / Directions Last dose taken    acetaminophen 650 MG 8 hour tablet   Commonly known as:  ACETAMINOPHEN 8 HOUR   Dose:  650 mg   Quantity:  250 tablet        Take 650 mg by mouth 2 times daily   Refills:  3        azelastine 0.1 % spray   Commonly known as:  ASTELIN   Dose:  2 spray   Quantity:  1 Bottle        Spray 2 sprays in nostril  2 times daily as needed   Refills:  11        cefUROXime 500 MG tablet   Commonly known as:  CEFTIN   Dose:  500 mg   Quantity:  20 tablet        Take 1 tablet (500 mg) by mouth 2 times daily   Refills:  0        cetirizine 10 MG tablet   Commonly known as:  zyrTEC   Dose:  10 mg   Quantity:  90 tablet        Take 1 tablet (10 mg) by mouth daily as needed   Refills:  3        cyclobenzaprine 10 MG tablet   Commonly known as:  FLEXERIL   Dose:  10 mg   Quantity:  270 tablet        Take 1 tablet (10 mg) by mouth 3 times daily as needed for muscle spasms   Refills:  3        diazepam 5 MG tablet   Commonly known as:  VALIUM   Dose:  5 mg   Quantity:  270 tablet        Take 1 tablet (5 mg) by mouth every 8 hours as needed for anxiety or muscle spasms Maximum #270 every 90 days.   Refills:  0        donepezil 10 MG tablet   Commonly known as:  ARICEPT   Dose:  10 mg   Quantity:  90 tablet        Take 1 tablet (10 mg) by mouth At Bedtime   Refills:  0        enoxaparin 120 MG/0.8ML injection   Commonly known as:  enoxaparin   Dose:  1 mg/kg   Quantity:  20 Syringe        Inject 0.9 mLs (135 mg) Subcutaneous every 12 hours   Refills:  1        guanFACINE 1 MG tablet   Commonly known as:  TENEX   Dose:  1 mg   Quantity:  90 tablet        Take 1 tablet (1 mg) by mouth At Bedtime   Refills:  3        isometheptene-acetaminophen-dichloralphenazone -100 MG per capsule   Commonly known as:  MIDRIN   Dose:  1 capsule   Quantity:  30 capsule        Take 1 capsule by mouth 4 times daily as needed   Refills:  0        levothyroxine 150 MCG tablet   Commonly known as:  SYNTHROID/LEVOTHROID   Dose:  150 mcg   Quantity:  90 tablet        Take 1 tablet (150 mcg) by mouth daily   Refills:  0        mirabegron 50 MG 24 hr tablet   Commonly known as:  MYRBETRIQ   Dose:  50 mg   Quantity:  90 tablet        Take 1 tablet (50 mg) by mouth daily   Refills:  3        pregabalin 100 MG capsule   Commonly known as:  LYRICA   Dose:  100 mg    Quantity:  270 capsule        Take 1 capsule (100 mg) by mouth 3 times daily Do not give if somnolent/sedated.   Refills:  3        propafenone 150 MG Tabs tablet   Commonly known as:  RYTHMOL   Dose:  150 mg   Quantity:  270 tablet        Take 1 tablet (150 mg) by mouth every 8 hours   Refills:  3        senna-docusate 8.6-50 MG per tablet   Commonly known as:  SENOKOT-S;PERICOLACE   Dose:  1 tablet   Quantity:  60 tablet        Take 1 tablet by mouth 2 times daily as needed for constipation   Refills:  11        simvastatin 40 MG tablet   Commonly known as:  ZOCOR   Dose:  40 mg   Quantity:  90 tablet        Take 1 tablet (40 mg) by mouth At Bedtime   Refills:  0        traZODone 100 MG tablet   Commonly known as:  DESYREL   Dose:  100 mg   Quantity:  90 tablet        Take 1 tablet (100 mg) by mouth nightly as needed for sleep   Refills:  3        venlafaxine 150 MG Tb24 24 hr tablet   Commonly known as:  EFFEXOR-ER   Dose:  150 mg   Quantity:  90 each        Take 1 tablet (150 mg) by mouth daily (with breakfast)   Refills:  3        warfarin 5 MG tablet   Commonly known as:  COUMADIN   Quantity:  96 tablet        Take 1 tab (5 mg) daily except 1 1/2 tab (7.5 mg) on Wednesdays or as instructed by INR clinic.   Refills:  0                Prescriptions were sent or printed at these locations (1 Prescription)                   Other Prescriptions                Printed at Department/Unit printer (1 of 1)         HYDROcodone-acetaminophen (NORCO) 5-325 MG per tablet                Procedures and tests performed during your visit     Abd/pelvis CT,  IV  contrast only TRAUMA / AAA    Basic metabolic panel    CBC with platelets differential    INR    POC US ABDOMEN LIMITED    Peripheral IV catheter      Orders Needing Specimen Collection     None      Pending Results     Date and Time Order Name Status Description    1/19/2017 1818 INR In process     1/19/2017 1810 POC US ABDOMEN LIMITED In process             Pending  Culture Results     No orders found from 1/18/2017 to 1/20/2017.       Test Results from your hospital stay           1/19/2017  6:48 PM - Interface, Flexilab Results      Component Results     Component Value Ref Range & Units Status    WBC 11.6 (H) 4.0 - 11.0 10e9/L Final    RBC Count 4.71 4.4 - 5.9 10e12/L Final    Hemoglobin 15.0 13.3 - 17.7 g/dL Final    Hematocrit 43.6 40.0 - 53.0 % Final    MCV 93 78 - 100 fl Final    MCH 31.8 26.5 - 33.0 pg Final    MCHC 34.4 31.5 - 36.5 g/dL Final    RDW 13.2 10.0 - 15.0 % Final    Platelet Count 192 150 - 450 10e9/L Final    Diff Method Automated Method  Final    % Neutrophils 80.2 % Final    % Lymphocytes 10.6 % Final    % Monocytes 7.8 % Final    % Eosinophils 0.7 % Final    % Basophils 0.3 % Final    % Immature Granulocytes 0.4 % Final    Nucleated RBCs 0 0 /100 Final    Absolute Neutrophil 9.3 (H) 1.6 - 8.3 10e9/L Final    Absolute Lymphocytes 1.2 0.8 - 5.3 10e9/L Final    Absolute Monocytes 0.9 0.0 - 1.3 10e9/L Final    Absolute Eosinophils 0.1 0.0 - 0.7 10e9/L Final    Absolute Basophils 0.0 0.0 - 0.2 10e9/L Final    Abs Immature Granulocytes 0.1 0 - 0.4 10e9/L Final    Absolute Nucleated RBC 0.0  Final         1/19/2017  7:06 PM - Interface, Flexilab Results      Component Results     Component Value Ref Range & Units Status    Sodium 139 133 - 144 mmol/L Final    Potassium 3.9 3.4 - 5.3 mmol/L Final    Chloride 103 94 - 109 mmol/L Final    Carbon Dioxide 31 20 - 32 mmol/L Final    Anion Gap 5 3 - 14 mmol/L Final    Glucose 115 (H) 70 - 99 mg/dL Final    Urea Nitrogen 15 7 - 30 mg/dL Final    Creatinine 0.99 0.66 - 1.25 mg/dL Final    GFR Estimate 77 >60 mL/min/1.7m2 Final    Non  GFR Calc    GFR Estimate If Black >90   GFR Calc   >60 mL/min/1.7m2 Final    Calcium 8.5 8.5 - 10.1 mg/dL Final         1/19/2017  6:10 PM - Service Account, Ob Stork         1/19/2017  9:42 PM - Interface, Radiant Ib      Narrative     CT ABDOMEN AND PELVIS  WITH CONTRAST 1/19/2017 7:54 PM     HISTORY: Lower abdominal pain.    COMPARISON: 8/11/2010.    TECHNIQUE: Volumetric helical acquisition of CT images from the lung  bases through the symphysis pubis after the administration of 100 mL  Isovue-370 intravenous contrast. Radiation dose for this scan was  reduced using automated exposure control, adjustment of the mA and/or  kV according to patient size, or iterative reconstruction technique.    FINDINGS: Portions of the pelvis are obscured by streak artifact from  bilateral hip arthroplasties. No hydronephrosis or urolithiasis  demonstrated. Cholecystectomy changes. Moderate amount of stool. No  diverticulitis demonstrated. There are mild atherosclerotic changes of  the visualized aorta and its branches. There is no evidence of aortic  dissection or aneurysm. The liver, bilateral kidneys and adrenal  glands, pancreas, and spleen demonstrate no worrisome focal lesion.  There is no free fluid in the abdomen or pelvis. No free air in the  abdomen. Bone windows reveal no destructive lesions. There are no  abdominal or pelvic lymph nodes that are abnormal by size criteria.  The visualized lung bases are unremarkable. There are no dilated loops  of small bowel or colon.        Impression     IMPRESSION: No acute process demonstrated within the abdomen and  pelvis.       DALTON RIOS MD         1/19/2017 10:39 PM - Interface, Mach 1 Development Results                Clinical Quality Measure: Blood Pressure Screening     Your blood pressure was checked while you were in the emergency department today. The last reading we obtained was  BP: 116/75 mmHg . Please read the guidelines below about what these numbers mean and what you should do about them.  If your systolic blood pressure (the top number) is less than 120 and your diastolic blood pressure (the bottom number) is less than 80, then your blood pressure is normal. There is nothing more that you need to do about it.  If your  "systolic blood pressure (the top number) is 120-139 or your diastolic blood pressure (the bottom number) is 80-89, your blood pressure may be higher than it should be. You should have your blood pressure rechecked within a year by a primary care provider.  If your systolic blood pressure (the top number) is 140 or greater or your diastolic blood pressure (the bottom number) is 90 or greater, you may have high blood pressure. High blood pressure is treatable, but if left untreated over time it can put you at risk for heart attack, stroke, or kidney failure. You should have your blood pressure rechecked by a primary care provider within the next 4 weeks.  If your provider in the emergency department today gave you specific instructions to follow-up with your doctor or provider even sooner than that, you should follow that instruction and not wait for up to 4 weeks for your follow-up visit.        Thank you for choosing [facility]       Thank you for choosing [facility] for your care. Our goal is always to provide you with excellent care. Hearing back from our patients is one way we can continue to improve our services. Please take a few minutes to complete the written survey that you may receive in the mail after you visit with us. Thank you!        [facility] Information     [employer] [facility] lets you send messages to your doctor, view your test results, renew your prescriptions, schedule appointments and more. To sign up, go to [email or web address] . Click on \"Log in\" on the left side of the screen, which will take you to the Welcome page. Then click on \"Sign up Now\" on the right side of the page.     You will be asked to enter the access code listed below, as well as some personal information. Please follow the directions to create your username and password.     Your access code is: [email or web address]  Expires: 2017  3:49 PM     Your access code will  in 90 days. If you need help or a new code, please call your [facility] " [facility] or [phone].        Care EveryWhere ID     This is your Care EveryWhere ID. This could be used by other organizations to access your [facility] medical records  [id number]        After Visit Summary       This is your record. Keep this with you and show to your community pharmacist(s) and doctor(s) at your next visit.

## 2017-01-19 NOTE — TELEPHONE ENCOUNTER
Pt returned call, UTI symptoms have not resolved, will reschedule appt with oral surgeon. Pt restarted warfarin last evening with 5 mg, did not take Lovenox. Advised pt to continue Lovenox, take 2.5 mg warfarin this morning since Wed is a 7.5 mg day and he only took 5 mg. Take 7.5 mg Thurs and Fri, INR check on Monday. Pt agreed with plan. Lucina Andrew RN

## 2017-01-19 NOTE — ED AVS SNAPSHOT
[facility] Emergency Department    [address]    [facility] [address]    Phone:  [phone]    Fax:  [phone]                                       Todd S Aschoff   MRN: [id number]    Department:  [facility] Emergency Department   Date of Visit:  1/19/2017           After Visit Summary Signature Page     I have received my discharge instructions, and my questions have been answered. I have discussed any challenges I see with this plan with the nurse or doctor.    ..........................................................................................................................................  Patient/Patient Representative Signature      ..........................................................................................................................................  Patient Representative Print Name and Relationship to Patient    ..................................................               ................................................  Date                                            Time    ..........................................................................................................................................  Reviewed by Signature/Title    ...................................................              ..............................................  Date                                                            Time

## 2017-01-19 NOTE — ED NOTES
Two day hx of supra-pubic abdominal pain. Pt woke up at 0400 yesterday with pain. Went to  To [facility]. Pt had urinary cath placed and started on Ceftin.  Pt came to ED due to onset of pain.

## 2017-01-23 ENCOUNTER — ANTICOAGULATION THERAPY VISIT (OUTPATIENT)
Dept: ANTICOAGULATION | Facility: CLINIC | Age: 62
End: 2017-01-23
Payer: COMMERCIAL

## 2017-01-23 DIAGNOSIS — Z79.01 LONG-TERM (CURRENT) USE OF ANTICOAGULANTS: Primary | ICD-10-CM

## 2017-01-23 DIAGNOSIS — Z95.2 AORTIC VALVE PROSTHESIS PRESENT: ICD-10-CM

## 2017-01-23 LAB — INR POINT OF CARE: 1.9 (ref 0.86–1.14)

## 2017-01-23 PROCEDURE — 99207 ZZC NO CHARGE NURSE ONLY: CPT

## 2017-01-23 PROCEDURE — 85610 PROTHROMBIN TIME: CPT | Mod: QW

## 2017-01-23 PROCEDURE — 36416 COLLJ CAPILLARY BLOOD SPEC: CPT

## 2017-01-23 NOTE — PROGRESS NOTES
ANTICOAGULATION FOLLOW-UP CLINIC VISIT    Patient Name:  Todd S Aschoff  Date:  1/23/2017  Contact Type:  Face to Face    SUBJECTIVE:     Patient Findings     Positives Antibiotic use or infection (keflex for UTI, 5 days left), Intentional hold of therapy (held for dental procedure, cancelled d/t UTI, will reschudule in about 1 month)    Comments Continue Lovenox           OBJECTIVE    INR PROTIME   Date Value Ref Range Status   01/23/2017 1.9* 0.86 - 1.14 Final       ASSESSMENT / PLAN  INR assessment SUB    Recheck INR In: 2 DAYS    INR Location Clinic      Anticoagulation Summary as of 1/23/2017     INR goal 2.5-3.5   Selected INR 1.9! (1/23/2017)   Maintenance plan 7.5 mg (5 mg x 1.5) on Wed; 5 mg (5 mg x 1) all other days   Full instructions 1/23: 10 mg; 1/24: 10 mg; Otherwise 7.5 mg on Wed; 5 mg all other days   Weekly total 37.5 mg   Plan last modified Amanda Wheeler RN (6/15/2016)   Next INR check 1/25/2017   Priority INR   Target end date     Indications   Long-term (current) use of anticoagulants [Z79.01] [Z79.01]  Aortic valve prosthesis present [Z95.2]  Atrial fibrillation (H) [I48.91]         Anticoagulation Episode Summary     INR check location     Preferred lab     Send INR reminders to [place] [facility]    Comments       Anticoagulation Care Providers     Provider Role Specialty Phone number    Obdulio Ingram MD [facility] Internal Medicine [phone]            See the Encounter Report to view Anticoagulation Flowsheet and Dosing Calendar (Go to Encounters tab in chart review, and find the Anticoagulation Therapy Visit)    Dosage adjustment made based on physician directed care plan.    Lucina Andrew RN

## 2017-01-25 ENCOUNTER — ANTICOAGULATION THERAPY VISIT (OUTPATIENT)
Dept: ANTICOAGULATION | Facility: CLINIC | Age: 62
End: 2017-01-25
Payer: COMMERCIAL

## 2017-01-25 ENCOUNTER — OFFICE VISIT (OUTPATIENT)
Dept: UROLOGY | Facility: CLINIC | Age: 62
End: 2017-01-25
Payer: COMMERCIAL

## 2017-01-25 VITALS
HEART RATE: 64 BPM | DIASTOLIC BLOOD PRESSURE: 68 MMHG | BODY MASS INDEX: 41.58 KG/M2 | WEIGHT: 307 LBS | SYSTOLIC BLOOD PRESSURE: 124 MMHG | HEIGHT: 72 IN

## 2017-01-25 DIAGNOSIS — N40.1 BENIGN PROSTATIC HYPERPLASIA WITH LOWER URINARY TRACT SYMPTOMS, UNSPECIFIED MORPHOLOGY: Primary | ICD-10-CM

## 2017-01-25 DIAGNOSIS — Z79.01 LONG-TERM (CURRENT) USE OF ANTICOAGULANTS: Primary | ICD-10-CM

## 2017-01-25 DIAGNOSIS — N41.0 PROSTATITIS, ACUTE: ICD-10-CM

## 2017-01-25 DIAGNOSIS — Z95.2 AORTIC VALVE PROSTHESIS PRESENT: ICD-10-CM

## 2017-01-25 LAB
ALBUMIN UR-MCNC: NEGATIVE MG/DL
APPEARANCE UR: CLEAR
BILIRUB UR QL STRIP: NEGATIVE
COLOR UR AUTO: YELLOW
GLUCOSE UR STRIP-MCNC: NEGATIVE MG/DL
HGB UR QL STRIP: NEGATIVE
INR POINT OF CARE: 2.6 (ref 0.86–1.14)
KETONES UR STRIP-MCNC: NEGATIVE MG/DL
LEUKOCYTE ESTERASE UR QL STRIP: NEGATIVE
NITRATE UR QL: NEGATIVE
PH UR STRIP: 5.5 PH (ref 5–7)
PR INTERVAL - MUSE: 25
PSA SERPL-MCNC: 13.8 NG/ML (ref 0–4)
SP GR UR STRIP: 1.02 (ref 1–1.03)
URN SPEC COLLECT METH UR: NORMAL
UROBILINOGEN UR STRIP-ACNC: 0.2 EU/DL (ref 0.2–1)

## 2017-01-25 PROCEDURE — 99207 ZZC NO CHARGE NURSE ONLY: CPT

## 2017-01-25 PROCEDURE — 84153 ASSAY OF PSA TOTAL: CPT | Performed by: UROLOGY

## 2017-01-25 PROCEDURE — 51798 US URINE CAPACITY MEASURE: CPT | Performed by: UROLOGY

## 2017-01-25 PROCEDURE — 81003 URINALYSIS AUTO W/O SCOPE: CPT | Performed by: UROLOGY

## 2017-01-25 PROCEDURE — 99213 OFFICE O/P EST LOW 20 MIN: CPT | Mod: 25 | Performed by: UROLOGY

## 2017-01-25 PROCEDURE — 36415 COLL VENOUS BLD VENIPUNCTURE: CPT | Performed by: UROLOGY

## 2017-01-25 PROCEDURE — 36416 COLLJ CAPILLARY BLOOD SPEC: CPT

## 2017-01-25 PROCEDURE — 85610 PROTHROMBIN TIME: CPT | Mod: QW

## 2017-01-25 RX ORDER — CIPROFLOXACIN 500 MG/1
500 TABLET, FILM COATED ORAL EVERY 12 HOURS
Qty: 30 TABLET | Refills: 0 | Status: SHIPPED | OUTPATIENT
Start: 2017-01-25 | End: 2017-02-08

## 2017-01-25 ASSESSMENT — PAIN SCALES - GENERAL: PAINLEVEL: NO PAIN (0)

## 2017-01-25 NOTE — MR AVS SNAPSHOT
After Visit Summary   1/25/2017    Todd S Aschoff    MRN: [id number]           Patient Information     Date Of Birth          1955        Visit Information        Provider Department      1/25/2017 9:10 AM Geraldo Gonzales MD [facility] [place]        Today's Diagnoses     Benign prostatic hyperplasia with lower urinary tract symptoms, unspecified morphology    -  1    Prostatitis, acute           Follow-ups after your visit        Your next 10 appointments already scheduled     Apr 10, 2017  1:45 PM CDT   [facility] with [facility]   [facility]    [address]  [facility] [address]   [phone]            Jun 08, 2017  9:00 AM CDT   Return Visit with Geraldo Gonzales MD   [facility]    [address]  [address]  [facility] [id number] [phone]              Who to contact     If you have questions or need follow up information about today's clinic visit or your schedule please contact [facility] directly at [phone].  Normal or non-critical lab and imaging results will be communicated to you by MyChart, letter or phone within 4 business days after the clinic has received the results. If you do not hear from us within 7 days, please contact the clinic through MyChart or phone. If you have a critical or abnormal lab result, we will notify you by phone as soon as possible.  Submit refill requests through [employer] or call your pharmacy and they will forward the refill request to us. Please allow 3 business days for your refill to be completed.          Additional Information About Your Visit        MyChart Information     [employer] gives you secure access to your electronic health record. If you see a primary care provider, you can also send  messages to your care team and make appointments. If you have questions, please call your primary care clinic.  If you do not have a primary care provider, please call [phone] and they will assist you.        Care EveryWhere ID     This is your Care EveryWhere ID. This could be used by other organizations to access your [facility] medical records  [id number]        Your Vitals Were     Pulse Height BMI (Body Mass Index)             64 1.829 m (6') 41.64 kg/m2          Blood Pressure from Last 3 Encounters:   03/13/17 115/76   03/06/17 101/56   02/06/17 121/84    Weight from Last 3 Encounters:   03/06/17 (!) 139.3 kg (307 lb)   02/08/17 (!) 139.3 kg (307 lb)   02/06/17 (!) 139.3 kg (307 lb)              We Performed the Following     MEASURE POST-VOID RESIDUAL URINE/BLADDER CAPACITY, US NON-IMAGING ([address]7550)     PSA Diag Urologic Phys     UA without Microscopic          Today's Medication Changes          These changes are accurate as of: 1/25/17 11:59 PM.  If you have any questions, ask your nurse or doctor.               Start taking these medicines.        Dose/Directions    ciprofloxacin 500 MG tablet   Commonly known as:  CIPRO   Used for:  Prostatitis, acute   Started by:  Geraldo Gonzales MD        Dose:  500 mg   Take 1 tablet (500 mg) by mouth every 12 hours   Quantity:  30 tablet   Refills:  0            Where to get your medicines      These medications were sent to [id number] - [place], [place] - [address]  [address] [facility] [address]     Phone:  [phone]     ciprofloxacin 500 MG tablet                Primary Care Provider Office Phone # Fax #    Obdulio Ingram [phone]       [facility] [address]  [facility] [address]        Thank you!     Thank you for choosing [facility] [place]  for your care. Our goal is always to provide you with excellent care. Hearing back from our patients is one way we can  continue to improve our services. Please take a few minutes to complete the written survey that you may receive in the mail after your visit with us. Thank you!             Your Updated Medication List - Protect others around you: Learn how to safely use, store and throw away your medicines at www.disposemymeds.org.          This list is accurate as of: 1/25/17 11:59 PM.  Always use your most recent med list.                   Brand Name Dispense Instructions for use    acetaminophen 650 MG 8 hour tablet    ACETAMINOPHEN 8 HOUR    250 tablet    Take 650 mg by mouth 2 times daily       azelastine 0.1 % spray    ASTELIN    1 Bottle    Spray 2 sprays in nostril 2 times daily as needed       cetirizine 10 MG tablet    zyrTEC    90 tablet    Take 1 tablet (10 mg) by mouth daily as needed       ciprofloxacin 500 MG tablet    CIPRO    30 tablet    Take 1 tablet (500 mg) by mouth every 12 hours       diazepam 5 MG tablet    VALIUM    270 tablet    Take 1 tablet (5 mg) by mouth every 8 hours as needed for anxiety or muscle spasms Maximum #270 every 90 days.       enoxaparin 120 MG/0.8ML injection    enoxaparin    20 Syringe    Inject 0.9 mLs (135 mg) Subcutaneous every 12 hours       guanFACINE 1 MG tablet    TENEX    90 tablet    Take 1 tablet (1 mg) by mouth At Bedtime       HYDROcodone-acetaminophen 5-325 MG per tablet    NORCO    15 tablet    Take 1 tablet by mouth every 6 hours as needed for pain       isometheptene-acetaminophen-dichloralphenazone -100 MG per capsule    MIDRIN    30 capsule    Take 1 capsule by mouth 4 times daily as needed       levothyroxine 150 MCG tablet    SYNTHROID/LEVOTHROID    90 tablet    Take 1 tablet (150 mcg) by mouth daily       senna-docusate 8.6-50 MG per tablet    SENOKOT-S;PERICOLACE    60 tablet    Take 1 tablet by mouth 2 times daily as needed for constipation       simvastatin 40 MG tablet    ZOCOR    90 tablet    Take 1 tablet (40 mg) by mouth At Bedtime       traZODone 100  MG tablet    DESYREL    90 tablet    Take 1 tablet (100 mg) by mouth nightly as needed for sleep       venlafaxine 150 MG Tb24 24 hr tablet    EFFEXOR-ER    90 each    Take 1 tablet (150 mg) by mouth daily (with breakfast)

## 2017-01-25 NOTE — PROGRESS NOTES
ANTICOAGULATION FOLLOW-UP CLINIC VISIT    Patient Name:  Todd S Aschoff  Date:  1/25/2017  Contact Type:  Face to Face    SUBJECTIVE:     Patient Findings     Positives Antibiotic use or infection (Started on Cipro 1/25/17 through Urology  )           OBJECTIVE    INR PROTIME   Date Value Ref Range Status   01/25/2017 2.6* 0.86 - 1.14 Final       ASSESSMENT / PLAN  INR assessment THER    Recheck INR In: 6 DAYS    INR Location Clinic      Anticoagulation Summary as of 1/25/2017     INR goal 2.5-3.5   Selected INR 2.6 (1/25/2017)   Maintenance plan 7.5 mg (5 mg x 1.5) on Wed; 5 mg (5 mg x 1) all other days   Full instructions 7.5 mg on Wed; 5 mg all other days   Weekly total 37.5 mg   No change documented Yadi Wells, RN   Plan last modified Amanda Wheeler RN (6/15/2016)   Next INR check 1/31/2017   Priority INR   Target end date     Indications   Long-term (current) use of anticoagulants [Z79.01] [Z79.01]  Aortic valve prosthesis present [Z95.2]  Atrial fibrillation (H) [I48.91]         Anticoagulation Episode Summary     INR check location     Preferred lab     Send INR reminders to [facility]    Comments       Anticoagulation Care Providers     Provider Role Specialty Phone number    Obdulio Ingram MD [facility] Internal Medicine [phone]            See the Encounter Report to view Anticoagulation Flowsheet and Dosing Calendar (Go to Encounters tab in chart review, and find the Anticoagulation Therapy Visit)        Yadi Wells RN

## 2017-01-25 NOTE — MR AVS SNAPSHOT
Todd S Aschoff   1/25/2017 2:00 PM   Anticoagulation Therapy Visit    Description:  61 year old male   Provider:  [facility]   Department:  [facility] Anti Coagulation           INR as of 1/25/2017     Selected INR 2.6 (1/25/2017)      Anticoagulation Summary as of 1/25/2017     INR goal 2.5-3.5   Selected INR 2.6 (1/25/2017)   Full instructions 7.5 mg on Wed; 5 mg all other days   Next INR check 1/31/2017    Indications   Long-term (current) use of anticoagulants [Z79.01] [Z79.01]  Aortic valve prosthesis present [Z95.2]  Atrial fibrillation (H) [I48.91]         Your next Anticoagulation Clinic appointment(s)     Jan 31, 2017  1:15 PM   Anticoagulation Visit with [facility]   [facility]    [address] [facility] [address]  [facility] [id number] [phone]              Contact Numbers     [facility] Phone Numbers:  Anticoagulation Clinic Appointments : [phone]  Anticoagulation Nurse: [phone]         January 2017 Details    Sun Mon Tue Wed Thu Fri Sat     1               2               3               4               5               6               7                 8               9               10               11               12               13               14                 15               16               17               18               19               20               21                 22               23               24               25      7.5 mg   See details      26      5 mg         27      5 mg         28      5 mg           29      5 mg         30      5 mg         31                 Date Details   01/25 This INR check       Date of next INR:  1/31/2017         How to take your warfarin dose     To take:  5 mg Take 1 of the 5 mg tablets.    To take:  7.5 mg Take 1.5 of the 5 mg tablets.

## 2017-01-25 NOTE — Clinical Note
1/25/2017       RE: Todd S Aschoff  [address]  [place] [address]     Dear Colleague,    Thank you for referring your patient, Todd S Aschoff, to the [facility] [place] [facility]. Please see a copy of my visit note below.    Nicko is a 61-year-old gentleman with polyneuropathy and a neurogenic bladder. He also has a family history of prostate cancer.  His PSA was 4.57 12 months ago.   In the past he had benign prostate biopsies except for some atypical glands in the right mid gland.  His PSA was 4.68 at that time.  Most recently he had hemorrhagic cystitis with Escherichia coli and was in the emergency room and was treated with Ceftin and a Zimmer catheter was placed on irrigation.  He was discharged with the Zimmer and returned the following night with bladder spasms and the Zimmer was irrigated and removed.  Today he has some terminal dysuria and a normal urinalysis.  His postvoid residual is 25 cc.  PSA has been redrawn  Other past medical history:obesity, headache, hypothyroid, atrial fibrillation, aortic dissection, sciatica, hyperlipidemia, arthritis, aortic valve prosthesis, major depression, alcohol dependence, heart murmur, coagulation disorder, chronic low back pain, nonsmoker  Medications: Long list reviewed  Allergies: None  Review of systems: As above, frequency and urgency during the day-controlled at night with myrbetriq, constipation  Exam: abdominal obesity.  Normal appearance, normal vital signs.  Alert and oriented, normocephalic, normal respirations  Normal sphincter tone, no rectal mass or impaction, prostate symmetric and benign.  Normal seminal vesicles  Assessment: Family history of prostate cancer, neurogenic bladder, urinary urgency during the day, recent hemorrhagic cystitis with Escherichia coli - suspect he may have prostatitis as source of increased urgency and recent infection  Plan: Cipro 500 mg every 12  hours for the next 2 weeks, see me back in 2 weeks for office cystoscopy and discuss PSA result    Again, thank you for allowing me to participate in the care of your patient.      Sincerely,    Geraldo Gonzales MD

## 2017-01-25 NOTE — PROGRESS NOTES
Nicko is a 61-year-old gentleman with polyneuropathy and a neurogenic bladder. He also has a family history of prostate cancer.  His PSA was 4.57 12 months ago.   In the past he had benign prostate biopsies except for some atypical glands in the right mid gland.  His PSA was 4.68 at that time.  Most recently he had hemorrhagic cystitis with Escherichia coli and was in the emergency room and was treated with Ceftin and a Zimmer catheter was placed on irrigation.  He was discharged with the Zimmer and returned the following night with bladder spasms and the Zimmer was irrigated and removed.  Today he has some terminal dysuria and a normal urinalysis.  His postvoid residual is 25 cc.  PSA has been redrawn  Other past medical history:obesity, headache, hypothyroid, atrial fibrillation, aortic dissection, sciatica, hyperlipidemia, arthritis, aortic valve prosthesis, major depression, alcohol dependence, heart murmur, coagulation disorder, chronic low back pain, nonsmoker  Medications: Long list reviewed  Allergies: None  Review of systems: As above, frequency and urgency during the day-controlled at night with myrbetriq, constipation  Exam: abdominal obesity.  Normal appearance, normal vital signs.  Alert and oriented, normocephalic, normal respirations  Normal sphincter tone, no rectal mass or impaction, prostate symmetric and benign.  Normal seminal vesicles  Assessment: Family history of prostate cancer, neurogenic bladder, urinary urgency during the day, recent hemorrhagic cystitis with Escherichia coli - suspect he may have prostatitis as source of increased urgency and recent infection  Plan: Cipro 500 mg every 12 hours for the next 2 weeks, see me back in 2 weeks for office cystoscopy and discuss PSA result

## 2017-01-31 ENCOUNTER — ANTICOAGULATION THERAPY VISIT (OUTPATIENT)
Dept: ANTICOAGULATION | Facility: CLINIC | Age: 62
End: 2017-01-31
Payer: COMMERCIAL

## 2017-01-31 DIAGNOSIS — Z79.01 LONG-TERM (CURRENT) USE OF ANTICOAGULANTS: Primary | ICD-10-CM

## 2017-01-31 DIAGNOSIS — Z95.2 AORTIC VALVE PROSTHESIS PRESENT: ICD-10-CM

## 2017-01-31 LAB — INR POINT OF CARE: 2.5 (ref 0.86–1.14)

## 2017-01-31 PROCEDURE — 36416 COLLJ CAPILLARY BLOOD SPEC: CPT

## 2017-01-31 PROCEDURE — 85610 PROTHROMBIN TIME: CPT | Mod: QW

## 2017-01-31 PROCEDURE — 99207 ZZC NO CHARGE NURSE ONLY: CPT

## 2017-01-31 NOTE — MR AVS SNAPSHOT
Todd S Aschoff   1/31/2017 1:15 PM   Anticoagulation Therapy Visit    Description:  61 year old male   Provider:  [facility]   Department:  [facility] Anti Coagulation           INR as of 1/31/2017     Selected INR 2.5 (1/31/2017)      Anticoagulation Summary as of 1/31/2017     INR goal 2.5-3.5   Selected INR 2.5 (1/31/2017)   Full instructions 2/4: 7.5 mg; Otherwise 7.5 mg on Wed; 5 mg all other days   Next INR check 2/8/2017    Indications   Long-term (current) use of anticoagulants [Z79.01] [Z79.01]  Aortic valve prosthesis present [Z95.2]  Atrial fibrillation (H) [I48.91]         Your next Anticoagulation Clinic appointment(s)     Feb 08, 2017 10:45 AM   Anticoagulation Visit with [facility]   [facility]    [address] [facility] [address]  [facility] [id number] [phone]              Contact Numbers     [facility] Phone Numbers:  Anticoagulation Clinic Appointments : [phone]  Anticoagulation Nurse: [phone]         January 2017 Details    Sun Mon Tue Wed Thu Fri Sat     1               2               3               4               5               6               7                 8               9               10               11               12               13               14                 15               16               17               18               19               20               21                 22               23               24               25               26               27               28                 29               30               31      5 mg   See details           Date Details   01/31 This INR check               How to take your warfarin dose     To take:  5 mg Take 1 of the 5 mg tablets.           February 2017 Details    Sun Mon Tue Wed Thu Fri Sat        1      7.5 mg         2      5 mg         3      5 mg         4      7.5 mg           5      5 mg         6      5 mg          7      5 mg         8            9               10               11                 12               13               14               15               16               17               18                 19               20               21               22               23               24               25                 26               27               28                    Date Details   No additional details    Date of next INR:  2/8/2017         How to take your warfarin dose     To take:  5 mg Take 1 of the 5 mg tablets.    To take:  7.5 mg Take 1.5 of the 5 mg tablets.

## 2017-01-31 NOTE — PROGRESS NOTES
ANTICOAGULATION FOLLOW-UP CLINIC VISIT    Patient Name:  Todd S Aschoff  Date:  1/31/2017  Contact Type:  Face to Face    SUBJECTIVE:     Patient Findings     Positives Medication Changes (Pt has 1 week left of Cipro)           OBJECTIVE    INR PROTIME   Date Value Ref Range Status   01/31/2017 2.5* 0.86 - 1.14 Final       ASSESSMENT / PLAN  INR assessment THER    Recheck INR In: 8 DAYS    INR Location Clinic      Anticoagulation Summary as of 1/31/2017     INR goal 2.5-3.5   Selected INR 2.5 (1/31/2017)   Maintenance plan 7.5 mg (5 mg x 1.5) on Wed; 5 mg (5 mg x 1) all other days   Full instructions 2/4: 7.5 mg; Otherwise 7.5 mg on Wed; 5 mg all other days   Weekly total 37.5 mg   Plan last modified Amanda Wheeler RN (6/15/2016)   Next INR check 2/8/2017   Priority INR   Target end date     Indications   Long-term (current) use of anticoagulants [Z79.01] [Z79.01]  Aortic valve prosthesis present [Z95.2]  Atrial fibrillation (H) [I48.91]         Anticoagulation Episode Summary     INR check location     Preferred lab     Send INR reminders to [facility]    Comments       Anticoagulation Care Providers     Provider Role Specialty Phone number    Obdulio Ingram MD Responsible Internal Medicine [phone]            See the Encounter Report to view Anticoagulation Flowsheet and Dosing Calendar (Go to Encounters tab in chart review, and find the Anticoagulation Therapy Visit)    Dosage adjustment made based on physician directed care plan.    Lucina Andrew RN

## 2017-02-03 DIAGNOSIS — N32.81 OVERACTIVE BLADDER: Primary | ICD-10-CM

## 2017-02-03 NOTE — TELEPHONE ENCOUNTER
Myrbetriq        Last Written Prescription Date:  1/8/16  Last Fill Quantity: 90,   # refills: 3  Last Office Visit with [facility] or  Health prescribing provider: 11/14/16  Future Office visit: none       Routing refill request to provider for review/approval because:  Drug not on the [facility] or [facility] refill protocol or controlled substance

## 2017-02-06 ENCOUNTER — APPOINTMENT (OUTPATIENT)
Dept: ULTRASOUND IMAGING | Facility: CLINIC | Age: 62
End: 2017-02-06
Attending: EMERGENCY MEDICINE
Payer: COMMERCIAL

## 2017-02-06 ENCOUNTER — HOSPITAL ENCOUNTER (EMERGENCY)
Facility: CLINIC | Age: 62
Discharge: HOME | End: 2017-02-06
Attending: EMERGENCY MEDICINE | Admitting: EMERGENCY MEDICINE
Payer: COMMERCIAL

## 2017-02-06 VITALS
HEART RATE: 73 BPM | TEMPERATURE: 98.5 F | SYSTOLIC BLOOD PRESSURE: 121 MMHG | HEIGHT: 73 IN | BODY MASS INDEX: 40.69 KG/M2 | OXYGEN SATURATION: 93 % | DIASTOLIC BLOOD PRESSURE: 84 MMHG | RESPIRATION RATE: 18 BRPM | WEIGHT: 307 LBS

## 2017-02-06 DIAGNOSIS — M62.838 MUSCLE SPASM: Primary | ICD-10-CM

## 2017-02-06 DIAGNOSIS — M79.662 PAIN OF LEFT LOWER LEG: ICD-10-CM

## 2017-02-06 LAB — INR PPP: 2.32 (ref 0.86–1.14)

## 2017-02-06 PROCEDURE — 99284 EMERGENCY DEPT VISIT MOD MDM: CPT | Mod: 25

## 2017-02-06 PROCEDURE — 36415 COLL VENOUS BLD VENIPUNCTURE: CPT | Performed by: EMERGENCY MEDICINE

## 2017-02-06 PROCEDURE — 93971 EXTREMITY STUDY: CPT | Mod: LT

## 2017-02-06 PROCEDURE — 85610 PROTHROMBIN TIME: CPT | Performed by: EMERGENCY MEDICINE

## 2017-02-06 RX ORDER — CYCLOBENZAPRINE HCL 10 MG
10 TABLET ORAL 3 TIMES DAILY PRN
Qty: 270 TABLET | Refills: 0 | Status: SHIPPED | OUTPATIENT
Start: 2017-02-06 | End: 2017-04-28

## 2017-02-06 NOTE — TELEPHONE ENCOUNTER
Cyclobenzaprine      Last Written Prescription Date:  01/08/16  Last Fill Quantity: 270,   # refills: 3  Last Office Visit with [facility] or  Health prescribing provider: 11/14/16  Future Office visit:       Routing refill request to provider for review/approval because:  Drug not on the [facility], [facility] or  [facility] refill protocol or controlled substance

## 2017-02-06 NOTE — ED AVS SNAPSHOT
[facility] Emergency Department    [address]    [facility] [address]    Phone:  [phone]    Fax:  [phone]                                       Todd S Aschoff   MRN: [id number]    Department:  [facility] Emergency Department   Date of Visit:  2/6/2017           After Visit Summary Signature Page     I have received my discharge instructions, and my questions have been answered. I have discussed any challenges I see with this plan with the nurse or doctor.    ..........................................................................................................................................  Patient/Patient Representative Signature      ..........................................................................................................................................  Patient Representative Print Name and Relationship to Patient    ..................................................               ................................................  Date                                            Time    ..........................................................................................................................................  Reviewed by Signature/Title    ...................................................              ..............................................  Date                                                            Time

## 2017-02-06 NOTE — TELEPHONE ENCOUNTER
Routing refill request to provider for review/approval because:  Drug not on the [facility] refill protocol     Please advise, thanks.

## 2017-02-07 RX ORDER — MIRABEGRON 50 MG/1
50 TABLET, EXTENDED RELEASE ORAL DAILY
Qty: 90 TABLET | Refills: 0 | Status: SHIPPED | OUTPATIENT
Start: 2017-02-07 | End: 2017-04-03

## 2017-02-07 NOTE — ED NOTES
ABCs intact. Pt is on Coumadin d/t artificial heart valve. Pt has been going on and off coumadin a few times for the past 5 weeks d/t various reasons. Pt c/o L Leg calf pain x 2 days. INR was 2.5 last week.     Pt's home meds: see epic

## 2017-02-07 NOTE — DISCHARGE INSTRUCTIONS
Muscle Strain in the Extremities  A muscle strain is a stretching and tearing of muscle fibers. This causes pain, especially when you move that muscle. There may also be some swelling and bruising.  Home care    Keep the hurt area raised to reduce pain and swelling. This is especially important during the first 48 hours.    Apply an ice pack over the injured area for 15 to 20 minutes every 3 to 6 hours. You should do this for the first 24 to 48 hours. You can make an ice pack by filling a plastic bag that seals at the top with ice cubes and then wrapping it with a thin towel. Be careful not to injure your skin with the ice treatments. Ice should never be applied directly to skin. Continue the use of ice packs for relief of pain and swelling as needed. After 48 hours, apply heat (warm shower or warm bath) for 15 to 20 minutes several times a day, or alternate ice and heat.    You may use over-the-counter pain medicine to control pain, unless another medicine was prescribed. If you have chronic liver or kidney disease or ever had a stomach ulcer or GI bleeding, talk with your healthcare provider before using these medicines.    For leg strains: If crutches have been recommended, don t put full weight on the hurt leg until you can do so without pain. You can return to sports when you are able to hop and run on the injured leg without pain.  Follow-up care  Follow up with your healthcare provider, or as advised.  When to seek medical advice  Call your healthcare provider right away if any of these occur:    The toes of the injured leg become swollen, cold, blue, numb, or tingly    Pain or swelling increases    9107-7334 [employer]. [address] [place] [address], [place] [address]. All rights reserved. This information is not intended as a substitute for professional medical care. Always follow your healthcare professional's instructions.

## 2017-02-07 NOTE — ED PROVIDER NOTES
History     Chief Complaint:  Leg Pain      HPI   Todd S Aschoff is a 61 year old male with a past medical history of atrial fibrillation, aortic valve prosthesis who presents for evaluation of leg pain. The patient reports that he got up two days ago and started to have leg cramp in his left leg. He tried stretching, but his cramping has been ongoing. He states that he was using Lovenox two weeks ago instead of his coumadin, but did start back on coumadin two weeks ago. The patient had his INR checked on the 31st of January which was 2.5. He denies leg swelling or history of heart attacks.       Allergies:  NKDA     Medications:    Acetaminophen  Astelin  Tenex  Effexor  Trazodone  Senokot  Mirabegron  Midrim  Zyrtec  Couamdin  Aricept  Valium  Enoxaparin  Simvastatin  Norco  Cipro  Flexeril      Past Medical History:    Chronic infection  Chronic pain  Numbness and tingling  Antiplatelet or antithrombotic long-term use  Low back pain  Blood transfusion  Heart murmur  BPH  Alcohol dependence  Major depression  Aortic valve prosthesis present  OA  Mixed hyperlipidemia  Sciatica  Dissection of aorta  Atrial fibrillation  Unspecified hypothyroidism  Allergy  Headache  Obesity     Past Surgical History:    Colonoscopy x2  Explore spine, remove hardware  Decompression lumbar one level  Irrigation and debridement spine  Fusion cervical anterior two levels  [employer] teeth removal  Tonsillectomy  Cataract iol  Cholecystectomy  Aortic valve replacement  Right hip replacement  Repair TAA with graft    Family History:    Father: Cerebrovascular disease, prostate cancer  Mother: Lymphoma  Brother: Hypertension, sleep apnea  Sister: Hypertension     Social History:  Marital Status:     Smoking status: Never smoker  Alcohol use: No      Review of Systems   Cardiovascular: Negative for leg swelling.   Musculoskeletal:        Positive for left leg pain   All other systems reviewed and are negative.      Physical Exam  "  First Vitals:  BP: 124/87 mmHg  Pulse: 73  Temp: 98.5  F (36.9  C)  Resp: 18  Height: 185.4 cm (6' 1\")  Weight: (!) 139.254 kg (307 lb)  SpO2: 97 %    Physical Exam  Constitutional:  Oriented to person, place, and time. Well appearing.   HENT:   Head:    Normocephalic.   Mouth/Throat:   Oropharynx is clear and moist.   Eyes:    EOM are normal. Pupils are equal, round, and reactive to light.   Neck:    Neck supple.   Cardiovascular:  Normal rate, regular rhythm and normal heart sounds.      Exam reveals no gallop and no friction rub.       No murmur heard.  Musculoskeletal:  Normal range of motion. 2+ distal pulses. Mild tenderness to his lower posterior calf. No edema or warmth.   Neurological:   Alert and oriented to person, place, and time.           Moves all 4 extremities spontaneously. 5/5 strength in all 4 extremities.  Sensation intact to light touch in all 4 extremities.  Skin:    No rash noted. No pallor. No erythema or warmth to left leg.     Emergency Department Course     Imaging:  Radiographic findings were communicated with the patient who voiced understanding of the findings.    US Lower Extremity Venous Duplex Left  Preliminary Result  IMPRESSION: Negative, no DVT identified in the left lower extremity.      Laboratory:  INR: 2.32 (H)      ED Course:  Nursing notes and past medical history reviewed.   I performed a physical examination of the patient as documented above.  I explained the plan with the patient who consents to this.   The above workups were undertaken.    I personally reviewed the laboratory and imaging results with the Patient and answered all related questions prior to discharge.   Findings and plan explained to the Patient. Patient discharged home with instructions regarding supportive care, medications, and reasons to return. The importance of close follow-up was reviewed.     Impression & Plan      Medical Decision Making:  Todd S Aschoff is a 61 year old male who came in " complaining of leg pain. Differential includes strain, DVT, radiculopathy or other causes. He did end up having an ultrasound which is otherwise negative. INR is therapeutic at this time and I do believe he is safe for discharge. He is told to follow up with primary doctor and return with any worsening pain or any other concerns.       Diagnosis:    ICD-10-CM    1. Pain of left lower leg M79.662 INR         Disposition:   Discharge to home with primary care follow up.       I, Maira Jean, am serving as a scribe on 2/6/2017 at 8:24 PM to personally document services performed by Julian Douglass MD, based on my observations and the provider's statements to me.          Julian Douglass MD  02/07/17 0037

## 2017-02-08 ENCOUNTER — ANTICOAGULATION THERAPY VISIT (OUTPATIENT)
Dept: ANTICOAGULATION | Facility: CLINIC | Age: 62
End: 2017-02-08
Payer: COMMERCIAL

## 2017-02-08 ENCOUNTER — OFFICE VISIT (OUTPATIENT)
Dept: UROLOGY | Facility: CLINIC | Age: 62
End: 2017-02-08
Payer: COMMERCIAL

## 2017-02-08 ENCOUNTER — MYC MEDICAL ADVICE (OUTPATIENT)
Dept: INTERNAL MEDICINE | Facility: CLINIC | Age: 62
End: 2017-02-08

## 2017-02-08 VITALS — BODY MASS INDEX: 39.4 KG/M2 | HEIGHT: 74 IN | WEIGHT: 307 LBS

## 2017-02-08 DIAGNOSIS — Z95.2 AORTIC VALVE PROSTHESIS PRESENT: ICD-10-CM

## 2017-02-08 DIAGNOSIS — Z79.01 LONG-TERM (CURRENT) USE OF ANTICOAGULANTS: Primary | ICD-10-CM

## 2017-02-08 DIAGNOSIS — G89.29 CHRONIC LOW BACK PAIN WITH SCIATICA, SCIATICA LATERALITY UNSPECIFIED, UNSPECIFIED BACK PAIN LATERALITY: Primary | ICD-10-CM

## 2017-02-08 DIAGNOSIS — N30.91 HEMORRHAGIC CYSTITIS: ICD-10-CM

## 2017-02-08 DIAGNOSIS — M54.40 CHRONIC LOW BACK PAIN WITH SCIATICA, SCIATICA LATERALITY UNSPECIFIED, UNSPECIFIED BACK PAIN LATERALITY: Primary | ICD-10-CM

## 2017-02-08 DIAGNOSIS — I48.91 ATRIAL FIBRILLATION (H): ICD-10-CM

## 2017-02-08 DIAGNOSIS — N32.81 OVERACTIVE BLADDER: ICD-10-CM

## 2017-02-08 DIAGNOSIS — N40.1 BENIGN PROSTATIC HYPERPLASIA WITH LOWER URINARY TRACT SYMPTOMS, UNSPECIFIED MORPHOLOGY: Primary | ICD-10-CM

## 2017-02-08 DIAGNOSIS — R97.20 ELEVATED PROSTATE SPECIFIC ANTIGEN (PSA): ICD-10-CM

## 2017-02-08 LAB
ALBUMIN UR-MCNC: NEGATIVE MG/DL
APPEARANCE UR: CLEAR
BILIRUB UR QL STRIP: NEGATIVE
COLOR UR AUTO: YELLOW
GLUCOSE UR STRIP-MCNC: NEGATIVE MG/DL
HGB UR QL STRIP: NEGATIVE
INR POINT OF CARE: 3 (ref 0.86–1.14)
KETONES UR STRIP-MCNC: NEGATIVE MG/DL
LEUKOCYTE ESTERASE UR QL STRIP: NEGATIVE
NITRATE UR QL: NEGATIVE
PH UR STRIP: 5.5 PH (ref 5–7)
SP GR UR STRIP: 1.01 (ref 1–1.03)
URN SPEC COLLECT METH UR: NORMAL
UROBILINOGEN UR STRIP-ACNC: 0.2 EU/DL (ref 0.2–1)

## 2017-02-08 PROCEDURE — 81003 URINALYSIS AUTO W/O SCOPE: CPT | Performed by: UROLOGY

## 2017-02-08 PROCEDURE — 85610 PROTHROMBIN TIME: CPT | Mod: QW

## 2017-02-08 PROCEDURE — 36416 COLLJ CAPILLARY BLOOD SPEC: CPT

## 2017-02-08 PROCEDURE — 99212 OFFICE O/P EST SF 10 MIN: CPT | Mod: 25 | Performed by: UROLOGY

## 2017-02-08 PROCEDURE — 99207 ZZC NO CHARGE NURSE ONLY: CPT

## 2017-02-08 PROCEDURE — 52000 CYSTOURETHROSCOPY: CPT | Performed by: UROLOGY

## 2017-02-08 RX ORDER — CEFUROXIME AXETIL 500 MG/1
TABLET ORAL
Refills: 0 | COMMUNITY
Start: 2017-01-18 | End: 2017-02-08

## 2017-02-08 ASSESSMENT — PAIN SCALES - GENERAL: PAINLEVEL: NO PAIN (0)

## 2017-02-08 NOTE — PROGRESS NOTES
Nicko is a 61-year-old gentleman with polyneuropathy and a neurogenic bladder. He was hospitalized with hemorrhagic cystitis and had a normal CT scan of the abdomen and pelvis. His urine culture grew Escherichia coli which is believed to be from a prostatic source. He's now been treated with 2 weeks of some additional Cipro orally. PSA last month was elevated at 13.8 probably due to his prostatitis. He does have a family history of prostate cancer and his PSAs in the past have been in the 4.5 range. He had a normal digital rectal exam last visit. He now presents for cystoscopy and will need a repeat PSA in the next several weeks  Flexible cystoscopy-normal urethra, moderate size lateral lobes, high riding bladder neck, 4+ trabeculation of bladder wall with no tumors or stones. No raised erythema.  Assessment: Family history of prostate cancer                         Elevated PSA-probably due to prostatitis                         Trabeculated bladder with urgency-controlled with myrbetriq. Patient seems to empty his bladder well, so I don't feel he needs Flomax or surgery at this point  Plan: Complete last Cipro dose tonight, PSA again in 2 weeks-if still elevated he'll need a 3 Elizabeth MRI scan of the prostate. See me in 6 months for urinalysis and postvoid residual

## 2017-02-08 NOTE — Clinical Note
2/8/2017       RE: Todd S Aschoff  [address]  [place] [address]     Dear Colleague,    Thank you for referring your patient, Todd S Aschoff, to the [facility] [place] [facility]. Please see a copy of my visit note below.    Nicko is a 61-year-old gentleman with polyneuropathy and a neurogenic bladder. He was hospitalized with hemorrhagic cystitis and had a normal CT scan of the abdomen and pelvis. His urine culture grew Escherichia coli which is believed to be from a prostatic source. He's now been treated with 2 weeks of some additional Cipro orally. PSA last month was elevated at 13.8 probably due to his prostatitis. He does have a family history of prostate cancer and his PSAs in the past have been in the 4.5 range. He had a normal digital rectal exam last visit. He now presents for cystoscopy and will need a repeat PSA in the next several weeks  Flexible cystoscopy-normal urethra, moderate size lateral lobes, high riding bladder neck, 4+ trabeculation of bladder wall with no tumors or stones. No raised erythema.  Assessment: Family history of prostate cancer                         Elevated PSA-probably due to prostatitis                         Trabeculated bladder with urgency-controlled with myrbetriq. Patient seems to empty his bladder well, so I don't feel he needs Flomax or surgery at this point  Plan: Complete last Cipro dose tonight, PSA again in 2 weeks-if still elevated he'll need a 3 [facility] MRI scan of the prostate. See me in 6 months for urinalysis and postvoid residual    Again, thank you for allowing me to participate in the care of your patient.      Sincerely,    Geraldo Gonzales MD

## 2017-02-08 NOTE — MR AVS SNAPSHOT
Todd S Aschoff   2/8/2017 10:45 AM   Anticoagulation Therapy Visit    Description:  61 year old male   Provider:  [facility]   Department:  [facility]           INR as of 2/8/2017     Selected INR 3.0 (2/8/2017)      Anticoagulation Summary as of 2/8/2017     INR goal 2.5-3.5   Selected INR 3.0 (2/8/2017)   Full instructions 7.5 mg on Wed, Sat; 5 mg all other days   Next INR check 2/17/2017    Indications   Long-term (current) use of anticoagulants [Z79.01] [Z79.01]  Aortic valve prosthesis present [Z95.2]  Atrial fibrillation (H) [I48.91]         Your next Anticoagulation Clinic appointment(s)     Feb 17, 2017 10:30 AM   Anticoagulation Visit with [facility]   [facility]    [address] [facility] [address]  [facility] [id number] [phone]              Contact Numbers     [facility] Phone Numbers:  Anticoagulation Clinic Appointments : [phone]  Anticoagulation Nurse: [phone]         February 2017 Details    Sun Mon Tue Wed Thu Fri Sat        1               2               3               4                 5               6               7               8      7.5 mg   See details      9      5 mg         10      5 mg         11      7.5 mg           12      5 mg         13      5 mg         14      5 mg         15      7.5 mg         16      5 mg         17            18                 19               20               21               22               23               24               25                 26               27               28                    Date Details   02/08 This INR check       Date of next INR:  2/17/2017         How to take your warfarin dose     To take:  5 mg Take 1 of the 5 mg tablets.    To take:  7.5 mg Take 1.5 of the 5 mg tablets.

## 2017-02-08 NOTE — NURSING NOTE
Chief Complaint   Patient presents with     Cystoscopy     Jeovany Hagen LPN 8:31 AM February 8, 2017    Prior to the start of the procedure and with procedural staff participation, I verbally confirmed the patient s identity using two indicators, relevant allergies, that the procedure was appropriate and matched the consent or emergent situation, and that the correct equipment/implants were available. Immediately prior to starting the procedure I conducted the Time Out with the procedural staff and re-confirmed the patient s name, procedure, and site/side. I have wiped the patient off with the povidone-Iodine solution, draped them,  used Lidocaine hydrochloride jelly, and instilled sterile water into the bladder. (The Joint Commission universal protocol was followed.)  Yes    Sedation (Moderate or Deep): None    Jeovany Hagen LPN 8:31 AM February 8, 2017

## 2017-02-08 NOTE — TELEPHONE ENCOUNTER
Lisanaheeda      Last Written Prescription Date:  01/08/16  Last Fill Quantity: 270,   # refills: 3  Last Office Visit with [facility], [facility] or [facility] prescribing provider: 11/14/16  Future Office visit:    Next 5 appointments (look out 90 days)     Feb 22, 2017  8:20 AM   PHYSICAL with Roque Horton MD   [facility]    [address] [place]  [facility] [address]   [phone]                   Routing refill request to provider for review/approval because:  Drug not on the [facility], [facility] or [facility] refill protocol or controlled substance

## 2017-02-08 NOTE — PATIENT INSTRUCTIONS
"     AFTER YOUR CYSTOSCOPY         You have just completed a cystoscopy, or \"cysto\", which allowed your physician to learn more about your bladder (or to remove a stent placed after surgery). We suggest that you continue to avoid caffeine, fruit juice, and alcohol for the next 24 hours, however, you are encouraged to return to your normal activities.       A few things that are considered normal after your cystoscopy:    * small amount of bleeding (or spotting) that clears within the next 24 hours    * slight burning sensation with urination    * sensation to of needing to avoid more frequently    * the feeling of \"air\" in your urine    * mild discomfort that is relieved with Tylonol        Please contact our office promptly if you:    * develop a fever above 101 degrees    * are unable to urinate    * develop bright red blood that does not stop    * severe pain or swelling        And of course, please contact our office with any concerns or questions [phone]      "

## 2017-02-08 NOTE — PROGRESS NOTES
ANTICOAGULATION FOLLOW-UP CLINIC VISIT    Patient Name:  Todd S Aschoff  Date:  2/8/2017  Contact Type:  Face to Face    SUBJECTIVE:     Patient Findings     Positives Antibiotic use or infection (Urology prescribed antibiotic for prostatitis.  Pt will be done with this antibiotic this evening.)           OBJECTIVE    INR PROTIME   Date Value Ref Range Status   02/08/2017 3.0* 0.86 - 1.14 Final       ASSESSMENT / PLAN  INR assessment THER    Recheck INR In: 10 DAYS    INR Location Clinic      Anticoagulation Summary as of 2/8/2017     INR goal 2.5-3.5   Selected INR 3.0 (2/8/2017)   Maintenance plan 7.5 mg (5 mg x 1.5) on Wed, Sat; 5 mg (5 mg x 1) all other days   Full instructions 7.5 mg on Wed, Sat; 5 mg all other days   Weekly total 40 mg   Plan last modified Amanda Wheeler RN (2/8/2017)   Next INR check 2/17/2017   Priority INR   Target end date     Indications   Long-term (current) use of anticoagulants [Z79.01] [Z79.01]  Aortic valve prosthesis present [Z95.2]  Atrial fibrillation (H) [I48.91]         Anticoagulation Episode Summary     INR check location     Preferred lab     Send INR reminders to [facility]    Comments       Anticoagulation Care Providers     Provider Role Specialty Phone number    Obdulio Ingram MD [facility] Internal Medicine [phone]            See the Encounter Report to view Anticoagulation Flowsheet and Dosing Calendar (Go to Encounters tab in chart review, and find the Anticoagulation Therapy Visit)    Dosage adjustment made based on physician directed care plan.    Amanda Wheeler RN

## 2017-02-08 NOTE — MR AVS SNAPSHOT
"              After Visit Summary   2/8/2017    Todd S Aschoff    MRN: [id number]           Patient Information     Date Of Birth          1955        Visit Information        Provider Department      2/8/2017 8:30 AM Geraldo Gonzales MD; [facility] [place]        Today's Diagnoses     Benign prostatic hyperplasia with lower urinary tract symptoms, unspecified morphology    -  1     Overactive bladder         Hemorrhagic cystitis         Elevated prostate specific antigen (PSA)           Care Instructions         AFTER YOUR CYSTOSCOPY         You have just completed a cystoscopy, or \"cysto\", which allowed your physician to learn more about your bladder (or to remove a stent placed after surgery). We suggest that you continue to avoid caffeine, fruit juice, and alcohol for the next 24 hours, however, you are encouraged to return to your normal activities.       A few things that are considered normal after your cystoscopy:    * small amount of bleeding (or spotting) that clears within the next 24 hours    * slight burning sensation with urination    * sensation to of needing to avoid more frequently    * the feeling of \"air\" in your urine    * mild discomfort that is relieved with Tylonol        Please contact our office promptly if you:    * develop a fever above 101 degrees    * are unable to urinate    * develop bright red blood that does not stop    * severe pain or swelling        And of course, please contact our office with any concerns or questions [phone]            Follow-ups after your visit        Your next 10 appointments already scheduled     Feb 08, 2017 10:45 AM   Anticoagulation Visit with [facility]   [facility]    [address] [facility] [address]  [facility] [id number] [phone]            Aug 08, 2017 10:20 AM   Return Visit with Geraldo Gonzales MD   [facility] " "[facility] [place] ([facility])    [address]  [address]  [facility] [address] [phone]              Future tests that were ordered for you today     Open Future Orders        Priority Expected Expires Ordered    PSA tumor marker [[id number]] Routine  2018            Who to contact     If you have questions or need follow up information about today's clinic visit or your schedule please contact [facility] [place] directly at [phone].  Normal or non-critical lab and imaging results will be communicated to you by CloudFXhart, letter or phone within 4 business days after the clinic has received the results. If you do not hear from us within 7 days, please contact the clinic through [employer] or phone. If you have a critical or abnormal lab result, we will notify you by phone as soon as possible.  Submit refill requests through [employer] or call your pharmacy and they will forward the refill request to us. Please allow 3 business days for your refill to be completed.          Additional Information About Your Visit        V[employer]pi Information     [employer]pi lets you send messages to your doctor, view your test results, renew your prescriptions, schedule appointments and more. To sign up, go to [email or web address] . Click on \"Log in\" on the left side of the screen, which will take you to the Welcome page. Then click on \"Sign up Now\" on the right side of the page.     You will be asked to enter the access code listed below, as well as some personal information. Please follow the directions to create your username and password.     Your access code is: [email or web address]  Expires: 2017  3:49 PM     Your access code will  in 90 days. If you need help or a new code, please call your [place] clinic or [phone].        Care EveryWhere ID     This is your Care EveryWhere ID. This could be used by other organizations to access " "your [place] medical records  [id number]        Your Vitals Were     Height BMI (Body Mass Index)                1.873 m (6' 1.75\") 39.69 kg/m2           Blood Pressure from Last 3 Encounters:   02/06/17 121/84   01/25/17 124/68   01/19/17 123/91    Weight from Last 3 Encounters:   02/08/17 139.254 kg (307 lb)   02/06/17 139.254 kg (307 lb)   01/25/17 139.254 kg (307 lb)              We Performed the Following     CYSTOSCOPY AND TREATMENT ([address])     UA without Microscopic        Primary Care Provider Office Phone # Fax #    Obdulio Ingram [phone]       [facility] [address] [place] [address]        Thank you!     Thank you for choosing [facility]  for your care. Our goal is always to provide you with excellent care. Hearing back from our patients is one way we can continue to improve our services. Please take a few minutes to complete the written survey that you may receive in the mail after your visit with us. Thank you!             Your Updated Medication List - Protect others around you: Learn how to safely use, store and throw away your medicines at www.disposemymeds.org.          This list is accurate as of: 2/8/17  9:01 AM.  Always use your most recent med list.                   Brand Name Dispense Instructions for use    acetaminophen 650 MG 8 hour tablet    ACETAMINOPHEN 8 HOUR    250 tablet    Take 650 mg by mouth 2 times daily       azelastine 0.1 % spray    ASTELIN    1 Bottle    Spray 2 sprays in nostril 2 times daily as needed       cetirizine 10 MG tablet    zyrTEC    90 tablet    Take 1 tablet (10 mg) by mouth daily as needed       CIPROFLOXACIN PO      Take 500 mg by mouth       cyclobenzaprine 10 MG tablet    FLEXERIL    270 tablet    Take 1 tablet (10 mg) by mouth 3 times daily as needed for muscle spasms       diazepam 5 MG tablet    VALIUM    270 tablet    Take 1 tablet (5 mg) by mouth every 8 hours as " needed for anxiety or muscle spasms Maximum #270 every 90 days.       donepezil 10 MG tablet    ARICEPT    90 tablet    Take 1 tablet (10 mg) by mouth At Bedtime       enoxaparin 120 MG/0.8ML injection    enoxaparin    20 Syringe    Inject 0.9 mLs (135 mg) Subcutaneous every 12 hours       guanFACINE 1 MG tablet    TENEX    90 tablet    Take 1 tablet (1 mg) by mouth At Bedtime       HYDROcodone-acetaminophen 5-325 MG per tablet    NORCO    15 tablet    Take 1 tablet by mouth every 6 hours as needed for pain       isometheptene-acetaminophen-dichloralphenazone -100 MG per capsule    MIDRIN    30 capsule    Take 1 capsule by mouth 4 times daily as needed       levothyroxine 150 MCG tablet    SYNTHROID/LEVOTHROID    90 tablet    Take 1 tablet (150 mcg) by mouth daily       mirabegron 50 MG 24 hr tablet    MYRBETRIQ    90 tablet    Take 1 tablet (50 mg) by mouth daily       pregabalin 100 MG capsule    LYRICA    270 capsule    Take 1 capsule (100 mg) by mouth 3 times daily Do not give if somnolent/sedated.       propafenone 150 MG Tabs tablet    RYTHMOL    270 tablet    Take 1 tablet (150 mg) by mouth every 8 hours       senna-docusate 8.6-50 MG per tablet    SENOKOT-S;PERICOLACE    60 tablet    Take 1 tablet by mouth 2 times daily as needed for constipation       simvastatin 40 MG tablet    ZOCOR    90 tablet    Take 1 tablet (40 mg) by mouth At Bedtime       traZODone 100 MG tablet    DESYREL    90 tablet    Take 1 tablet (100 mg) by mouth nightly as needed for sleep       venlafaxine 150 MG Tb24 24 hr tablet    EFFEXOR-ER    90 each    Take 1 tablet (150 mg) by mouth daily (with breakfast)       warfarin 5 MG tablet    COUMADIN    96 tablet    Take 1 tab (5 mg) daily except 1 1/2 tab (7.5 mg) on Wednesdays or as instructed by INR clinic.

## 2017-02-08 NOTE — TELEPHONE ENCOUNTER
See mychart message  See several telephone call for each med he was looking for refills    See my response.    Closed

## 2017-02-09 RX ORDER — PREGABALIN 100 MG/1
100 CAPSULE ORAL 3 TIMES DAILY
Qty: 270 CAPSULE | Refills: 0 | Status: SHIPPED | OUTPATIENT
Start: 2017-02-09 | End: 2017-04-28

## 2017-02-17 DIAGNOSIS — Z11.59 NEED FOR HEPATITIS C SCREENING TEST: ICD-10-CM

## 2017-02-17 DIAGNOSIS — E78.5 HYPERLIPIDEMIA LDL GOAL <130: Primary | ICD-10-CM

## 2017-02-17 DIAGNOSIS — I48.20 CHRONIC ATRIAL FIBRILLATION (H): ICD-10-CM

## 2017-02-17 RX ORDER — PROPAFENONE HYDROCHLORIDE 150 MG/1
150 TABLET, COATED ORAL EVERY 8 HOURS
Qty: 270 TABLET | Refills: 0 | Status: SHIPPED | OUTPATIENT
Start: 2017-02-17 | End: 2017-04-28

## 2017-02-17 NOTE — TELEPHONE ENCOUNTER
Propafenone      Last Written Prescription Date:  1/8/16  Last Fill Quantity: 270,   # refills: 3  Last Office Visit with [facility] or [facility] prescribing provider: 11/14/16  Future Office visit:    Next 5 appointments (look out 90 days)     Feb 22, 2017  8:20 AM CST   PHYSICAL with Roque Horton MD   [facility]    [address] [facility] [address]   [phone]                   Routing refill request to provider for review/approval because:  Drug not on the [facility], [facility] or [facility] refill protocol or controlled substance

## 2017-02-22 DIAGNOSIS — I48.20 CHRONIC ATRIAL FIBRILLATION (H): ICD-10-CM

## 2017-02-22 DIAGNOSIS — E78.5 HYPERLIPIDEMIA LDL GOAL <130: ICD-10-CM

## 2017-02-22 DIAGNOSIS — Z11.59 NEED FOR HEPATITIS C SCREENING TEST: ICD-10-CM

## 2017-02-22 DIAGNOSIS — R97.20 ELEVATED PROSTATE SPECIFIC ANTIGEN (PSA): ICD-10-CM

## 2017-02-22 PROCEDURE — 84153 ASSAY OF PSA TOTAL: CPT | Performed by: UROLOGY

## 2017-02-22 PROCEDURE — 80053 COMPREHEN METABOLIC PANEL: CPT | Performed by: UROLOGY

## 2017-02-22 PROCEDURE — 36415 COLL VENOUS BLD VENIPUNCTURE: CPT | Performed by: UROLOGY

## 2017-02-22 PROCEDURE — 86803 HEPATITIS C AB TEST: CPT | Performed by: UROLOGY

## 2017-02-22 PROCEDURE — 80061 LIPID PANEL: CPT | Performed by: UROLOGY

## 2017-02-23 LAB
ALBUMIN SERPL-MCNC: 3.8 G/DL (ref 3.4–5)
ALP SERPL-CCNC: 84 U/L (ref 40–150)
ALT SERPL W P-5'-P-CCNC: 54 U/L (ref 0–70)
ANION GAP SERPL CALCULATED.3IONS-SCNC: 9 MMOL/L (ref 3–14)
AST SERPL W P-5'-P-CCNC: 31 U/L (ref 0–45)
BILIRUB SERPL-MCNC: 1.7 MG/DL (ref 0.2–1.3)
BUN SERPL-MCNC: 15 MG/DL (ref 7–30)
CALCIUM SERPL-MCNC: 9.1 MG/DL (ref 8.5–10.1)
CHLORIDE SERPL-SCNC: 104 MMOL/L (ref 94–109)
CHOLEST SERPL-MCNC: 175 MG/DL
CO2 SERPL-SCNC: 29 MMOL/L (ref 20–32)
CREAT SERPL-MCNC: 1.07 MG/DL (ref 0.66–1.25)
GFR SERPL CREATININE-BSD FRML MDRD: 70 ML/MIN/1.7M2
GLUCOSE SERPL-MCNC: 100 MG/DL (ref 70–99)
HCV AB SERPL QL IA: NORMAL
HDLC SERPL-MCNC: 46 MG/DL
LDLC SERPL CALC-MCNC: 99 MG/DL
NONHDLC SERPL-MCNC: 129 MG/DL
POTASSIUM SERPL-SCNC: 4.4 MMOL/L (ref 3.4–5.3)
PROT SERPL-MCNC: 7.2 G/DL (ref 6.8–8.8)
PSA SERPL-MCNC: 5.84 UG/L (ref 0–4)
SODIUM SERPL-SCNC: 142 MMOL/L (ref 133–144)
TRIGL SERPL-MCNC: 152 MG/DL

## 2017-03-02 ENCOUNTER — TELEPHONE (OUTPATIENT)
Dept: UROLOGY | Facility: CLINIC | Age: 62
End: 2017-03-02

## 2017-03-02 NOTE — TELEPHONE ENCOUNTER
Returning patient's phone call.  He called concerned about results of his latest PSA.  I did give him his results since Dr. Gonzales is on vacation.  On 2/22/17 it was 5.84; down from 13.80 on 1/25/17.  I told him that Dr. Gonzales would call him if there was still a concern; otherwise follow-up in 6 months for UA and PVR.  Flor Dodge LPN

## 2017-03-06 ENCOUNTER — APPOINTMENT (OUTPATIENT)
Dept: GENERAL RADIOLOGY | Facility: CLINIC | Age: 62
End: 2017-03-06
Attending: EMERGENCY MEDICINE
Payer: COMMERCIAL

## 2017-03-06 ENCOUNTER — HOSPITAL ENCOUNTER (EMERGENCY)
Facility: CLINIC | Age: 62
Discharge: HOME | End: 2017-03-06
Attending: EMERGENCY MEDICINE | Admitting: EMERGENCY MEDICINE
Payer: COMMERCIAL

## 2017-03-06 VITALS
WEIGHT: 307 LBS | DIASTOLIC BLOOD PRESSURE: 56 MMHG | HEIGHT: 73 IN | TEMPERATURE: 100.1 F | HEART RATE: 77 BPM | OXYGEN SATURATION: 97 % | RESPIRATION RATE: 18 BRPM | BODY MASS INDEX: 40.69 KG/M2 | SYSTOLIC BLOOD PRESSURE: 101 MMHG

## 2017-03-06 DIAGNOSIS — R53.1 WEAKNESS GENERALIZED: ICD-10-CM

## 2017-03-06 DIAGNOSIS — J11.1 INFLUENZA: ICD-10-CM

## 2017-03-06 DIAGNOSIS — T81.89XA DELAYED SURGICAL WOUND HEALING, INITIAL ENCOUNTER: ICD-10-CM

## 2017-03-06 LAB
ALBUMIN SERPL-MCNC: 3 G/DL (ref 3.4–5)
ALP SERPL-CCNC: 90 U/L (ref 40–150)
ALT SERPL W P-5'-P-CCNC: 59 U/L (ref 0–70)
ANION GAP SERPL CALCULATED.3IONS-SCNC: 6 MMOL/L (ref 3–14)
AST SERPL W P-5'-P-CCNC: 45 U/L (ref 0–45)
BASOPHILS # BLD AUTO: 0 10E9/L (ref 0–0.2)
BASOPHILS NFR BLD AUTO: 0.4 %
BILIRUB SERPL-MCNC: 1.1 MG/DL (ref 0.2–1.3)
BUN SERPL-MCNC: 12 MG/DL (ref 7–30)
CALCIUM SERPL-MCNC: 8 MG/DL (ref 8.5–10.1)
CHLORIDE SERPL-SCNC: 104 MMOL/L (ref 94–109)
CO2 SERPL-SCNC: 31 MMOL/L (ref 20–32)
CREAT SERPL-MCNC: 0.91 MG/DL (ref 0.66–1.25)
DIFFERENTIAL METHOD BLD: ABNORMAL
EOSINOPHIL # BLD AUTO: 0 10E9/L (ref 0–0.7)
EOSINOPHIL NFR BLD AUTO: 0.4 %
ERYTHROCYTE [DISTWIDTH] IN BLOOD BY AUTOMATED COUNT: 12.8 % (ref 10–15)
FLUAV+FLUBV AG SPEC QL: ABNORMAL
FLUAV+FLUBV AG SPEC QL: NEGATIVE
GFR SERPL CREATININE-BSD FRML MDRD: 85 ML/MIN/1.7M2
GLUCOSE SERPL-MCNC: 93 MG/DL (ref 70–99)
HCT VFR BLD AUTO: 42.8 % (ref 40–53)
HGB BLD-MCNC: 14.2 G/DL (ref 13.3–17.7)
IMM GRANULOCYTES # BLD: 0 10E9/L (ref 0–0.4)
IMM GRANULOCYTES NFR BLD: 0.2 %
INR PPP: 2.39 (ref 0.86–1.14)
LACTATE BLD-SCNC: 1.3 MMOL/L (ref 0.7–2.1)
LYMPHOCYTES # BLD AUTO: 0.8 10E9/L (ref 0.8–5.3)
LYMPHOCYTES NFR BLD AUTO: 15.5 %
MCH RBC QN AUTO: 30.6 PG (ref 26.5–33)
MCHC RBC AUTO-ENTMCNC: 33.2 G/DL (ref 31.5–36.5)
MCV RBC AUTO: 92 FL (ref 78–100)
MONOCYTES # BLD AUTO: 0.7 10E9/L (ref 0–1.3)
MONOCYTES NFR BLD AUTO: 14 %
NEUTROPHILS # BLD AUTO: 3.6 10E9/L (ref 1.6–8.3)
NEUTROPHILS NFR BLD AUTO: 69.5 %
NRBC # BLD AUTO: 0 10*3/UL
NRBC BLD AUTO-RTO: 0 /100
PLATELET # BLD AUTO: 149 10E9/L (ref 150–450)
POTASSIUM SERPL-SCNC: 4.1 MMOL/L (ref 3.4–5.3)
PROT SERPL-MCNC: 6.2 G/DL (ref 6.8–8.8)
RBC # BLD AUTO: 4.64 10E12/L (ref 4.4–5.9)
SODIUM SERPL-SCNC: 141 MMOL/L (ref 133–144)
SPECIMEN SOURCE: ABNORMAL
WBC # BLD AUTO: 5.2 10E9/L (ref 4–11)

## 2017-03-06 PROCEDURE — 36415 COLL VENOUS BLD VENIPUNCTURE: CPT | Performed by: EMERGENCY MEDICINE

## 2017-03-06 PROCEDURE — 25800025 ZZH RX 258: Performed by: EMERGENCY MEDICINE

## 2017-03-06 PROCEDURE — 93005 ELECTROCARDIOGRAM TRACING: CPT

## 2017-03-06 PROCEDURE — 80053 COMPREHEN METABOLIC PANEL: CPT | Performed by: EMERGENCY MEDICINE

## 2017-03-06 PROCEDURE — 36415 COLL VENOUS BLD VENIPUNCTURE: CPT

## 2017-03-06 PROCEDURE — 87804 INFLUENZA ASSAY W/OPTIC: CPT | Mod: 91 | Performed by: EMERGENCY MEDICINE

## 2017-03-06 PROCEDURE — 85025 COMPLETE CBC W/AUTO DIFF WBC: CPT | Performed by: EMERGENCY MEDICINE

## 2017-03-06 PROCEDURE — 99285 EMERGENCY DEPT VISIT HI MDM: CPT

## 2017-03-06 PROCEDURE — 83605 ASSAY OF LACTIC ACID: CPT | Performed by: EMERGENCY MEDICINE

## 2017-03-06 PROCEDURE — 85610 PROTHROMBIN TIME: CPT | Performed by: EMERGENCY MEDICINE

## 2017-03-06 PROCEDURE — 25000132 ZZH RX MED GY IP 250 OP 250 PS 637: Performed by: EMERGENCY MEDICINE

## 2017-03-06 PROCEDURE — 87040 BLOOD CULTURE FOR BACTERIA: CPT | Mod: 91 | Performed by: EMERGENCY MEDICINE

## 2017-03-06 PROCEDURE — 71020 XR CHEST 2 VW: CPT

## 2017-03-06 PROCEDURE — 25000128 H RX IP 250 OP 636: Performed by: EMERGENCY MEDICINE

## 2017-03-06 RX ORDER — OXYCODONE HYDROCHLORIDE 5 MG/1
5 TABLET ORAL EVERY 6 HOURS PRN
Qty: 10 TABLET | Refills: 0 | Status: SHIPPED | OUTPATIENT
Start: 2017-03-06 | End: 2017-04-28

## 2017-03-06 RX ORDER — OSELTAMIVIR PHOSPHATE 75 MG/1
75 CAPSULE ORAL 2 TIMES DAILY
Qty: 9 CAPSULE | Refills: 0 | Status: SHIPPED | OUTPATIENT
Start: 2017-03-06 | End: 2017-03-11

## 2017-03-06 RX ORDER — OSELTAMIVIR PHOSPHATE 75 MG/1
75 CAPSULE ORAL ONCE
Status: COMPLETED | OUTPATIENT
Start: 2017-03-06 | End: 2017-03-06

## 2017-03-06 RX ORDER — GINSENG 100 MG
CAPSULE ORAL
Status: DISCONTINUED
Start: 2017-03-06 | End: 2017-03-06 | Stop reason: HOSPADM

## 2017-03-06 RX ORDER — ACETAMINOPHEN 325 MG/1
975 TABLET ORAL ONCE
Status: COMPLETED | OUTPATIENT
Start: 2017-03-06 | End: 2017-03-06

## 2017-03-06 RX ORDER — SODIUM CHLORIDE, SODIUM LACTATE, POTASSIUM CHLORIDE, CALCIUM CHLORIDE 600; 310; 30; 20 MG/100ML; MG/100ML; MG/100ML; MG/100ML
1000 INJECTION, SOLUTION INTRAVENOUS CONTINUOUS
Status: DISCONTINUED | OUTPATIENT
Start: 2017-03-06 | End: 2017-03-06 | Stop reason: HOSPADM

## 2017-03-06 RX ADMIN — SODIUM CHLORIDE 1000 ML: 9 INJECTION, SOLUTION INTRAVENOUS at 16:01

## 2017-03-06 RX ADMIN — SODIUM CHLORIDE, POTASSIUM CHLORIDE, SODIUM LACTATE AND CALCIUM CHLORIDE 1000 ML: 600; 310; 30; 20 INJECTION, SOLUTION INTRAVENOUS at 16:01

## 2017-03-06 RX ADMIN — OSELTAMIVIR PHOSPHATE 75 MG: 75 CAPSULE ORAL at 16:01

## 2017-03-06 RX ADMIN — ACETAMINOPHEN 975 MG: 325 TABLET, FILM COATED ORAL at 14:15

## 2017-03-06 ASSESSMENT — ENCOUNTER SYMPTOMS
HEADACHES: 0
SORE THROAT: 0
FATIGUE: 1
COUGH: 1
FEVER: 1
WOUND: 1
DIZZINESS: 1
WEAKNESS: 1
DIAPHORESIS: 1
DYSURIA: 0

## 2017-03-06 NOTE — DISCHARGE INSTRUCTIONS
Discharge Instructions  Influenza    You were diagnosed today with influenza or influenza like illness.  Influenza is a respiratory illness caused by influenza A or B viruses.  Influenza causes fever, headache, muscle aches, and fatigue.  These symptoms start one to four days after you have been around a person with this illness.  People with influenza commonly have a dry cough, sore throat, and a runny nose.   Influenza is spread through sneezing and coughing and can live on surfaces for several days.  It is usually contagious for 5 days but in some cases up to 10 days and often affects several family members. If you have a family member who is less than 2 years old, older than 65 years old, pregnant or has a serious medical condition, they should be seen right away by a physician to decide if they should take preventative medications.      Return to the Emergency Department if:    You have trouble breathing.    You develop pain in your chest.    You have signs of being dehydrated, such as dizziness or unable to urinate at least three times daily.    You feel confused.    You cannot stop vomiting or you cannot drink enough fluids.    In children, you should seek help if the child has any of the above or if child:    Has blue or purplish skin color.    Is so irritable that he or she does not want to be held.    Does not have tears when crying (in infants) or does not urinate at least three times daily.    Does not wake up easily.    Follow-up with your doctor if you are not improving after 5 days.    What can I do to help myself?    Rest.    Fluids -- Drink hydrating solutions such as Gatorade  or Pedialyte  as often as you can. If you are drinking enough, you should pass urine at least every eight hours.    Tylenol  (acetaminophen) and Advil  (ibuprofen) can relieve fever, headache, and muscle aches. Do not give aspirin to children under 18 years old.     Antiviral treatment -- Antiviral medicines do not make the  flu symptoms go away immediately.  They have only been shown to make the symptoms go away 12 to 24 hours sooner than they would without treatment.       Antibiotics -- Antibiotics are NOT useful for treating viral illnesses such as influenza. Antibiotics should only be used if there is a bacterial complication of the flu such as bacterial pneumonia, ear infection, or sinusitis.    Because you were diagnosed with a flu like illness you are very contagious.  This means you cannot work, attend school or  for at least 24 hours or until you no longer have a fever.  If you were given a prescription for medicine here today, be sure to read all of the information (including the package insert) that comes with your prescription.  This will include important information about the medicine, its side effects, and any warnings that you need to know about.  The pharmacist who fills the prescription can provide more information and answer questions you may have about the medicine.  If you have questions or concerns that the pharmacist cannot address, please call or return to the Emergency Department.   Opioid Medication Information    Pain medications are among the most commonly prescribed medicines, so we are including this information for all our patients. If you did not receive pain medication or get a prescription for pain medicine, you can ignore it.     You may have been given a prescription for an opioid (narcotic) pain medicine and/or have received a pain medicine while here in the Emergency Department. These medicines can make you drowsy or impaired. You must not drive, operate dangerous equipment, or engage in any other dangerous activities while taking these medications. If you drive while taking these medications, you could be arrested for DUI, or driving under the influence. Do not drink any alcohol while you are taking these medications.     Opioid pain medications can cause addiction. If you have a history of  chemical dependency of any type, you are at a higher risk of becoming addicted to pain medications.  Only take these prescribed medications to treat your pain when all other options have been tried. Take it for as short a time and as few doses as possible. Store your pain pills in a secure place, as they are frequently stolen and provide a dangerous opportunity for children or visitors in your house to start abusing these powerful medications. We will not replace any lost or stolen medicine.  As soon as your pain is better, you should flush all your remaining medication.     Many prescription pain medications contain Tylenol  (acetaminophen), including Vicodin , Tylenol #3 , Norco , Lortab , and Percocet .  You should not take any extra pills of Tylenol  if you are using these prescription medications or you can get very sick.  Do not ever take more than 3000 mg of acetaminophen in any 24 hour period.    All opioids tend to cause constipation. Drink plenty of water and eat foods that have a lot of fiber, such as fruits, vegetables, prune juice, apple juice and high fiber cereal.  Take a laxative if you don t move your bowels at least every other day. Miralax , Milk of Magnesia, Colace , or Senna  can be used to keep you regular.      Remember that you can always come back to the Emergency Department if you are not able to see your regular doctor in the amount of time listed above, if you get any new symptoms, or if there is anything that worries you.        Wound Check After Surgery, No Complication  Surgery involves cutting through layers of skin, fatty tissue, muscle, and sometimes bone and cartilage. Sutures (stitches) or staples are used to close all layers of the wound. The sutures on the inside will dissolve in about 2 to 3 weeks. Any sutures or staples used on the outside need to be removed in about 7 to 14 days, depending on the location.    It is normal to have some clear or bloody discharge on the wound  covering or bandage (dressing) for the first few days after surgery. If your wound was sutured (sewn) closed, you should not have to change the dressing more than twice a day in the first few days. Bleeding or discharge requiring more frequent dressing changes can be a sign of a problem.  It is normal to feel pain at the incision site. The pain decreases as the wound heals. Most of the pain and soreness from the skin incision should go away by the time the sutures or staples are removed. Soreness and pain from deeper tissues may last another week or two.   Pain that continues more than a few weeks after surgery or pain that worsens anytime after surgery can be a sign of a problem, such as:    Infection    Separation of wound edges    Collection of blood or other below the skin  Home care  Different types of surgery require different types of care and dressing changes. It is important to follow all instructions and advice from your surgeon, as well as other members of your healthcare team.  Wound care    Keep the wound clean, as directed by your healthcare provider.    Change the dressing as directed. Change the dressing sooner if it becomes wet or stained with blood or fluid from the wound.    Bathe with a sponge (no shower or tub baths) for the first few days after surgery, or until there is no more drainage from the wound. Unless you received different instructions from your surgeon, you can then shower. Do not soak the area in water (no baths or swimming) until the tape, sutures, or staples are removed and any wound opening has dried out and healed.  Changing the dressing    Wash your hands before changing the dressings.    Carefully remove the dressing and tape; don t just yank it off. If it sticks to the wound, you may need to wet it a little to remove it, unless your healthcare provider told you not to wet it.    Wash your hands again before putting on a new, clean dressing.    Gently clean the wound with  clean water (or saline) using gauze or a clean washcloth. Do not rub it or pick at it.    Do not use soap, alcohol, hydrogen peroxide, or any other cleanser.    If you were told to dry the wound before putting on a new dressing, gently pat it dry. Do not rub.    Throw out the old dressing. Do not reuse it!    Wash your hands again when you are done.  Types of dressings  Your healthcare team will tell you what type of dressing to put on your wound. Follow your healthcare team s instructions carefully, and contact them if you have any questions. Two common types of dressings are described below. You may have one of these or another type.    Dry dressing. Use dry gauze. If the wound is still draining, use a  nonadherent  dressing, which shouldn t stick to the wound.    Wet-to-dry dressing. Wet the gauze, and squeeze out the excess water (or saline), before putting it on. Then, cover this with a dry pad.  Medicines    If you were given antibiotics, take them until they are used up or your healthcare provider tells you to stop. It is important to finish the antibiotics even though you feel better, to make sure the infection has cleared.    You can take acetaminophen or ibuprofen for pain, unless you were given a different pain medicine to use. (Note: If you have chronic liver or kidney disease, or have ever had a stomach ulcer or gastrointestinal bleeding, or are taking blood thinner medicines, talk with your healthcare provider before using these medicines.)    Aspirin should never be used in anyone under 18 years of age who is ill with a fever. It may cause severe liver damage.  Follow-up care  Follow up with your healthcare provider, or as advised, for your next wound check or removal of your sutures, staples, or tape.    If a culture was done, you will be notified if the results will affect your treatment. You can call as directed for the results.    If imaging tests, such as X-rays, an ultrasound, or CT scan were  done, they will be reviewed by a specialist. You will be notified of the results, especially if they affect treatment.  Call 911  Call emergency services right away if any of these occur:    Trouble breathing or swallowing, wheezing    Hoarse voice or trouble speaking    Extreme confusion     Extreme drowsiness or trouble awakening    Fainting or loss of consciousness    Rapid heart rate or very slow heart rate    Vomiting blood, or large amounts of blood in stool    Discomfort in the center of the chest that feels like pressure, squeezing, a sense of fullness, or pain.    Discomfort or pain in other upper body areas, such as the back, one or both arms, neck, jaw, or stomach    Stroke symptoms (spot a stroke  FAST )    F: Face drooping. One side of the face is numb or droops.    A: Arm weakness. One arm feels weak or numb.    S: Speech difficulty: Speech is slurred, or the person is unable to speak.    T: Time to call 911. Even if symptoms go away, call 911.  When to seek medical advice   Call your healthcare provider right away if any of the following occur:    Increasing pain at the site of surgery    Fever over 100.4  F (38  C)    Redness around the wound    Fluid, pus, or blood draining from the wound    Vomiting, constipation, or diarrhea    8388-1475 [employer]. [address] [place] [address], [place] [address]. All rights reserved. This information is not intended as a substitute for professional medical care. Always follow your healthcare professional's instructions.

## 2017-03-06 NOTE — ED NOTES
Abscess drained in clinic in thoracic area Friday. Pt states after procedure he felt worse, cough began. Pt awoke this am feeling feverish, dizzy, diaphoresis, and congestion feeling in chest. ABC in tact.

## 2017-03-06 NOTE — ED PROVIDER NOTES
History     Chief Complaint:  Generalized Weakness and Fever      The history is provided by the patient.      Todd S Aschoff is a 61 year old male on Coumadin with complex past medical history who presents with generalized weakness and fever.  Patient had a cyst removed from his back 3/3/17 in clinic.  Over the past weekend he experienced a lot of pain in the area and has had worsening cough and generalized weakness with development of fever, dizziness, congestion, and diaphoresis today prompting visit to the emergency department.  He reports that he was to have the bandaging changed today.  Patient's son has been sick lately.  Patient has been taking his Cipro (3 days) as directed.  He denies sore throat, dysuria, headaches, or other complaint.       Allergies:  No known drug allergies      Medications:    Acetaminophen  Astelin  Tenex  Effexor  Trazodone  Senokot  Mirabegron  Midrim  Zyrtec  Coumadin  Aricept  Valium  Enoxaparin  Simvastatin  Norco  Cipro  Flexeril   Coumadin  Tylenol #3  Cipro     Past Medical History:    Chronic infection  Chronic pain  Numbness and tingling  Antiplatelet or antithrombotic long-term use  Low back pain  Blood transfusion  Heart murmur  BPH  Alcohol dependence  Major depression  Aortic valve prosthesis present  OA  Mixed hyperlipidemia  Sciatica  Dissection of aorta  Atrial fibrillation  Unspecified hypothyroidism  Allergy  Headache  Obesity      Past Surgical History:   Colonoscopy x2  Explore spine, remove hardware  Decompression lumbar one level  Irrigation and debridement spine  Fusion cervical anterior two levels  Abbot teeth removal  Tonsillectomy  Cataract iol  Cholecystectomy  Aortic valve replacement  Right hip replacement  Repair TAA with graft     Family History:   Father: Cerebrovascular disease, prostate cancer  Mother: Lymphoma  Brother: Hypertension, sleep apnea  Sister: Hypertension      Social History:  Presents alone   Tobacco use: Never  Alcohol use: Stopped  "2009  PCP: Obdulio Ingram MD    Marital Status:          Review of Systems   Constitutional: Positive for diaphoresis, fatigue and fever.   HENT: Positive for congestion. Negative for sore throat.    Respiratory: Positive for cough.    Genitourinary: Negative for dysuria.   Skin: Positive for wound.   Neurological: Positive for dizziness and weakness. Negative for headaches.   All other systems reviewed and are negative.      Physical Exam     Patient Vitals for the past 24 hrs:   BP Temp Temp src Pulse Heart Rate Resp SpO2 Height Weight   03/06/17 1430 - - - - 67 - - - -   03/06/17 1415 101/56 - - - 66 - 97 % - -   03/06/17 1400 96/58 - - - 71 - 96 % - -   03/06/17 1355 - - - - - - 91 % - -   03/06/17 1354 107/64 - - - 69 - 90 % - -   03/06/17 1345 - - - - - - 92 % - -   03/06/17 1344 111/64 100.1  F (37.8  C) Oral 77 77 18 91 % 1.854 m (6' 1\") (!) 139.3 kg (307 lb)       Physical Exam  Constitutional:  Oriented to person, place, and time. In minor distress.  HENT:   Head:    Normocephalic.   Mouth/Throat:   Oropharynx is clear and moist.  No erythema.    Eyes:    EOM are normal. Pupils are equal, round, and reactive to light.   Neck:    Neck supple.   Cardiovascular:  Normal rate, regular rhythm and normal heart sounds.      Exam reveals no gallop and no friction rub.       No murmur heard.  Pulmonary/Chest:  Effort normal and breath sounds normal.      No respiratory distress. No wheezes. No rales.      No reproducible chest wall pain.  Abdominal:   Soft. No distension. No tenderness. No rebound and no guarding.   Musculoskeletal:  Normal range of motion.  2+ distal equal pulses.     No leg calf tenderness, swelling or edema.   Neurological:   Alert and oriented to person, place, and time.           Moves all 4 extremities spontaneously    Skin:    No rash noted. No pallor.  10 cm raw opened wound to mid thoracic back between scapula from previous cyst removal without purulence, surrounding erythema, or " significant swelling.     Emergency Department Course   ECG (13:49:02):  Rate 74 bpm. WA interval 160. QRS duration 84. QT/QTc 386/428. P-R-T axes 48 -36 82.  Normal sinus rhythm.  Left axis deviation.  Abnormal ECG.  Interpreted at 1349 by Julian Douglass MD.     Imaging:  Radiographic findings were communicated with the patient who voiced understanding of the findings.    XR Chest:  IMPRESSION: Cardiac silhouette and pulmonary vasculature are within normal limits. No focal airspace disease, pleural effusion or pneumothorax.    LENA OSMAN    Preliminary result per radiology.    Laboratory:  CBC:  (L) ow WNL (WBC 5.2, HGB 14.2)   CMP: Calcium 8.0 (L), Albumin 3.0 (L), Protein total 6.2 (L) ow WNL (Creatinine 0.91)   Lactic Acid: 1.3   INR: 2.39 (H)  Blood culture x 2: pending    Influenza A/B antigen: A Neg, B Positive    Interventions:  NS 1L IV Bolus   1415: Tylenol 975 mg PO  1601: Tamiflu 75 mg PO   1601: Lactated ringers 1L IV Bolus    Emergency Department Course:  The patient arrived in the emergency department via EMS.  Past medical records, nursing notes, and vitals reviewed.  1400: I performed an exam of the patient as documented above.    IV inserted and blood drawn.   The patient was sent for a XR while in the emergency department, findings above.   I rechecked the patient. Explained findings to patient.   1544: Discussed the patient with Josee Connelly PA-C of hospitalist service who agreed to come see the patient in the ED.  1613: Patient felt comfortable going home after talking with Josee Connelly PA-C.  I personally reviewed the laboratory results with the Patient and answered all related questions prior to discharge.    1617: I rechecked the patient.  Findings and plan explained to the Patient. Patient discharged home with instructions regarding supportive care, medications, and reasons to return. The importance of close follow-up was reviewed.        Impression & Plan    Medical Decision  Making:  Todd S Aschoff is a 61 year old male that came in complaining of generalized fever, weakness, as well as back pain along wound site of cyst removal.  Differential includes infected wound, abscess, cellulitis, pneumonia, influenza, and other causes.  He underwent workup as seen.  Lab work confirms he is influenza type B positive and is otherwise negative.  He has no evidence of pneumonia in chest XR.  His wound does appear to be raw and painful but has no surrounding erythema, swelling, purulence, or concerning signs of infection or abscess underneath requiring antibiotics or drainage.  Initially patient was feeling too weak to be discharged home but after further consult with Josee Connelly PA-C of observation admissions and after IV fluid bolus was feeling improved.  In light of this I do feel he is safe and appropriate for discharge.  Patient was requesting further pain medication for his wound along back which does appear to be quite painful.  He has a chronic pain policy in place but in light of his acute issue and no recent prescriptions other than his standard Tylenol codeine that was previously prescribed on review of [facility] I will discharge him home with short course of Roxicodone as well as Tamiflu for his influenza.  He is told to push oral hydration and follow up with primary doctor.  He will return for any worsening weakness, chest pain, shortness of breath, cough, expanding redness or swelling along wound site, or fevers.      Diagnosis:    ICD-10-CM    1. Influenza J11.1    2. Weakness generalized R53.1        Disposition:  Admitted to hospitalist service.      Jeremy Pearl  3/6/2017   [facility] EMERGENCY DEPARTMENT    I, Jeremy Pearl, am serving as a scribe at 1:58 PM on 3/6/2017 to document services personally performed by Julian Douglass MD based on my observations and the provider's statements to me.       Julian Douglass MD  03/06/17 9533

## 2017-03-06 NOTE — ED NOTES
A/O. VSS. PIV removed. Pt verbalized understanding of d/c instructions and ambulated to lobby steady gait.   Script rx oxycodone and tamiflu given to pt at time of d/c

## 2017-03-06 NOTE — ED AVS SNAPSHOT
[facility] Emergency Department    [address]    [facility] [address]    Phone:  [phone]    Fax:  [phone]                                       Todd S Aschoff   MRN: [id number]    Department:  [facility] Emergency Department   Date of Visit:  3/6/2017           Patient Information     Date Of Birth          1955        Your diagnoses for this visit were:     Influenza     Weakness generalized     Delayed surgical wound healing, initial encounter        You were seen by Julian Douglass MD.      Follow-up Information     Follow up with Obdulio Ingram MD. Call in 2 days.    Specialty:  Internal Medicine    Contact information:    [facility]  [address]  [facility] [id number] [phone]          Discharge Instructions       Discharge Instructions  Influenza    You were diagnosed today with influenza or influenza like illness.  Influenza is a respiratory illness caused by influenza A or B viruses.  Influenza causes fever, headache, muscle aches, and fatigue.  These symptoms start one to four days after you have been around a person with this illness.  People with influenza commonly have a dry cough, sore throat, and a runny nose.   Influenza is spread through sneezing and coughing and can live on surfaces for several days.  It is usually contagious for 5 days but in some cases up to 10 days and often affects several family members. If you have a family member who is less than 2 years old, older than 65 years old, pregnant or has a serious medical condition, they should be seen right away by a physician to decide if they should take preventative medications.      Return to the Emergency Department if:    You have trouble breathing.    You develop pain in your chest.    You have signs of being dehydrated, such as dizziness or unable to urinate at least three times daily.    You feel confused.    You cannot stop vomiting or you cannot  drink enough fluids.    In children, you should seek help if the child has any of the above or if child:    Has blue or purplish skin color.    Is so irritable that he or she does not want to be held.    Does not have tears when crying (in infants) or does not urinate at least three times daily.    Does not wake up easily.    Follow-up with your doctor if you are not improving after 5 days.    What can I do to help myself?    Rest.    Fluids -- Drink hydrating solutions such as Gatorade  or Pedialyte  as often as you can. If you are drinking enough, you should pass urine at least every eight hours.    Tylenol  (acetaminophen) and Advil  (ibuprofen) can relieve fever, headache, and muscle aches. Do not give aspirin to children under 18 years old.     Antiviral treatment -- Antiviral medicines do not make the flu symptoms go away immediately.  They have only been shown to make the symptoms go away 12 to 24 hours sooner than they would without treatment.       Antibiotics -- Antibiotics are NOT useful for treating viral illnesses such as influenza. Antibiotics should only be used if there is a bacterial complication of the flu such as bacterial pneumonia, ear infection, or sinusitis.    Because you were diagnosed with a flu like illness you are very contagious.  This means you cannot work, attend school or  for at least 24 hours or until you no longer have a fever.  If you were given a prescription for medicine here today, be sure to read all of the information (including the package insert) that comes with your prescription.  This will include important information about the medicine, its side effects, and any warnings that you need to know about.  The pharmacist who fills the prescription can provide more information and answer questions you may have about the medicine.  If you have questions or concerns that the pharmacist cannot address, please call or return to the Emergency Department.   Opioid Medication  Information    Pain medications are among the most commonly prescribed medicines, so we are including this information for all our patients. If you did not receive pain medication or get a prescription for pain medicine, you can ignore it.     You may have been given a prescription for an opioid (narcotic) pain medicine and/or have received a pain medicine while here in the Emergency Department. These medicines can make you drowsy or impaired. You must not drive, operate dangerous equipment, or engage in any other dangerous activities while taking these medications. If you drive while taking these medications, you could be arrested for DUI, or driving under the influence. Do not drink any alcohol while you are taking these medications.     Opioid pain medications can cause addiction. If you have a history of chemical dependency of any type, you are at a higher risk of becoming addicted to pain medications.  Only take these prescribed medications to treat your pain when all other options have been tried. Take it for as short a time and as few doses as possible. Store your pain pills in a secure place, as they are frequently stolen and provide a dangerous opportunity for children or visitors in your house to start abusing these powerful medications. We will not replace any lost or stolen medicine.  As soon as your pain is better, you should flush all your remaining medication.     Many prescription pain medications contain Tylenol  (acetaminophen), including Vicodin , Tylenol #3 , Norco , Lortab , and Percocet .  You should not take any extra pills of Tylenol  if you are using these prescription medications or you can get very sick.  Do not ever take more than 3000 mg of acetaminophen in any 24 hour period.    All opioids tend to cause constipation. Drink plenty of water and eat foods that have a lot of fiber, such as fruits, vegetables, prune juice, apple juice and high fiber cereal.  Take a laxative if you don t  move your bowels at least every other day. Miralax , Milk of Magnesia, Colace , or Senna  can be used to keep you regular.      Remember that you can always come back to the Emergency Department if you are not able to see your regular doctor in the amount of time listed above, if you get any new symptoms, or if there is anything that worries you.        Wound Check After Surgery, No Complication  Surgery involves cutting through layers of skin, fatty tissue, muscle, and sometimes bone and cartilage. Sutures (stitches) or staples are used to close all layers of the wound. The sutures on the inside will dissolve in about 2 to 3 weeks. Any sutures or staples used on the outside need to be removed in about 7 to 14 days, depending on the location.    It is normal to have some clear or bloody discharge on the wound covering or bandage (dressing) for the first few days after surgery. If your wound was sutured (sewn) closed, you should not have to change the dressing more than twice a day in the first few days. Bleeding or discharge requiring more frequent dressing changes can be a sign of a problem.  It is normal to feel pain at the incision site. The pain decreases as the wound heals. Most of the pain and soreness from the skin incision should go away by the time the sutures or staples are removed. Soreness and pain from deeper tissues may last another week or two.   Pain that continues more than a few weeks after surgery or pain that worsens anytime after surgery can be a sign of a problem, such as:    Infection    Separation of wound edges    Collection of blood or other below the skin  Home care  Different types of surgery require different types of care and dressing changes. It is important to follow all instructions and advice from your surgeon, as well as other members of your healthcare team.  Wound care    Keep the wound clean, as directed by your healthcare provider.    Change the dressing as directed. Change the  dressing sooner if it becomes wet or stained with blood or fluid from the wound.    Bathe with a sponge (no shower or tub baths) for the first few days after surgery, or until there is no more drainage from the wound. Unless you received different instructions from your surgeon, you can then shower. Do not soak the area in water (no baths or swimming) until the tape, sutures, or staples are removed and any wound opening has dried out and healed.  Changing the dressing    Wash your hands before changing the dressings.    Carefully remove the dressing and tape; don t just yank it off. If it sticks to the wound, you may need to wet it a little to remove it, unless your healthcare provider told you not to wet it.    Wash your hands again before putting on a new, clean dressing.    Gently clean the wound with clean water (or saline) using gauze or a clean washcloth. Do not rub it or pick at it.    Do not use soap, alcohol, hydrogen peroxide, or any other cleanser.    If you were told to dry the wound before putting on a new dressing, gently pat it dry. Do not rub.    Throw out the old dressing. Do not reuse it!    Wash your hands again when you are done.  Types of dressings  Your healthcare team will tell you what type of dressing to put on your wound. Follow your healthcare team s instructions carefully, and contact them if you have any questions. Two common types of dressings are described below. You may have one of these or another type.    Dry dressing. Use dry gauze. If the wound is still draining, use a  nonadherent  dressing, which shouldn t stick to the wound.    Wet-to-dry dressing. Wet the gauze, and squeeze out the excess water (or saline), before putting it on. Then, cover this with a dry pad.  Medicines    If you were given antibiotics, take them until they are used up or your healthcare provider tells you to stop. It is important to finish the antibiotics even though you feel better, to make sure the  infection has cleared.    You can take acetaminophen or ibuprofen for pain, unless you were given a different pain medicine to use. (Note: If you have chronic liver or kidney disease, or have ever had a stomach ulcer or gastrointestinal bleeding, or are taking blood thinner medicines, talk with your healthcare provider before using these medicines.)    Aspirin should never be used in anyone under 18 years of age who is ill with a fever. It may cause severe liver damage.  Follow-up care  Follow up with your healthcare provider, or as advised, for your next wound check or removal of your sutures, staples, or tape.    If a culture was done, you will be notified if the results will affect your treatment. You can call as directed for the results.    If imaging tests, such as X-rays, an ultrasound, or CT scan were done, they will be reviewed by a specialist. You will be notified of the results, especially if they affect treatment.  Call 911  Call emergency services right away if any of these occur:    Trouble breathing or swallowing, wheezing    Hoarse voice or trouble speaking    Extreme confusion     Extreme drowsiness or trouble awakening    Fainting or loss of consciousness    Rapid heart rate or very slow heart rate    Vomiting blood, or large amounts of blood in stool    Discomfort in the center of the chest that feels like pressure, squeezing, a sense of fullness, or pain.    Discomfort or pain in other upper body areas, such as the back, one or both arms, neck, jaw, or stomach    Stroke symptoms (spot a stroke  FAST )    F: Face drooping. One side of the face is numb or droops.    A: Arm weakness. One arm feels weak or numb.    S: Speech difficulty: Speech is slurred, or the person is unable to speak.    T: Time to call 911. Even if symptoms go away, call 911.  When to seek medical advice   Call your healthcare provider right away if any of the following occur:    Increasing pain at the site of surgery    Fever  over 100.4  F (38  C)    Redness around the wound    Fluid, pus, or blood draining from the wound    Vomiting, constipation, or diarrhea    2524-3250 [employer]. [address] [place] [address], [place] [address]. All rights reserved. This information is not intended as a substitute for professional medical care. Always follow your healthcare professional's instructions.          Future Appointments        Provider Department Dept Phone Center    8/8/2017 10:20 AM Geraldo Gonzales MD [facility] [phone] [facility]      24 Hour Appointment Hotline       To make an appointment at any [facility], call [phone] ([phone]). If you don't have a family doctor or clinic, we will help you find one. [facility] clinics are conveniently located to serve the needs of you and your family.             Review of your medicines      START taking        Dose / Directions Last dose taken    oseltamivir 75 MG capsule   Commonly known as:  TAMIFLU   Dose:  75 mg   Quantity:  9 capsule        Take 1 capsule (75 mg) by mouth 2 times daily for 5 days   Refills:  0        oxyCODONE 5 MG IR tablet   Commonly known as:  ROXICODONE   Dose:  5 mg   Quantity:  10 tablet        Take 1 tablet (5 mg) by mouth every 6 hours as needed for pain   Refills:  0          Our records show that you are taking the medicines listed below. If these are incorrect, please call your family doctor or clinic.        Dose / Directions Last dose taken    acetaminophen 650 MG 8 hour tablet   Commonly known as:  ACETAMINOPHEN 8 HOUR   Dose:  650 mg   Quantity:  250 tablet        Take 650 mg by mouth 2 times daily   Refills:  3        azelastine 0.1 % spray   Commonly known as:  ASTELIN   Dose:  2 spray   Quantity:  1 Bottle        Spray 2 sprays in nostril 2 times daily as needed   Refills:  11        cetirizine 10 MG tablet   Commonly known as:  zyrTEC   Dose:  10 mg   Quantity:  90 tablet         Take 1 tablet (10 mg) by mouth daily as needed   Refills:  3        CIPROFLOXACIN PO   Dose:  500 mg        Take 500 mg by mouth   Refills:  0        cyclobenzaprine 10 MG tablet   Commonly known as:  FLEXERIL   Dose:  10 mg   Quantity:  270 tablet        Take 1 tablet (10 mg) by mouth 3 times daily as needed for muscle spasms   Refills:  0        diazepam 5 MG tablet   Commonly known as:  VALIUM   Dose:  5 mg   Quantity:  270 tablet        Take 1 tablet (5 mg) by mouth every 8 hours as needed for anxiety or muscle spasms Maximum #270 every 90 days.   Refills:  0        donepezil 10 MG tablet   Commonly known as:  ARICEPT   Dose:  10 mg   Quantity:  90 tablet        Take 1 tablet (10 mg) by mouth At Bedtime   Refills:  0        enoxaparin 120 MG/0.8ML injection   Commonly known as:  enoxaparin   Dose:  1 mg/kg   Quantity:  20 Syringe        Inject 0.9 mLs (135 mg) Subcutaneous every 12 hours   Refills:  1        guanFACINE 1 MG tablet   Commonly known as:  TENEX   Dose:  1 mg   Quantity:  90 tablet        Take 1 tablet (1 mg) by mouth At Bedtime   Refills:  3        HYDROcodone-acetaminophen 5-325 MG per tablet   Commonly known as:  NORCO   Dose:  1 tablet   Quantity:  15 tablet        Take 1 tablet by mouth every 6 hours as needed for pain   Refills:  0        isometheptene-acetaminophen-dichloralphenazone -100 MG per capsule   Commonly known as:  MIDRIN   Dose:  1 capsule   Quantity:  30 capsule        Take 1 capsule by mouth 4 times daily as needed   Refills:  0        levothyroxine 150 MCG tablet   Commonly known as:  SYNTHROID/LEVOTHROID   Dose:  150 mcg   Quantity:  90 tablet        Take 1 tablet (150 mcg) by mouth daily   Refills:  0        mirabegron 50 MG 24 hr tablet   Commonly known as:  MYRBETRIQ   Dose:  50 mg   Quantity:  90 tablet        Take 1 tablet (50 mg) by mouth daily   Refills:  0        pregabalin 100 MG capsule   Commonly known as:  LYRICA   Dose:  100 mg   Quantity:  270 capsule         Take 1 capsule (100 mg) by mouth 3 times daily Do not give if somnolent/sedated.   Refills:  0        propafenone 150 MG Tabs tablet   Commonly known as:  RYTHMOL   Dose:  150 mg   Quantity:  270 tablet        Take 1 tablet (150 mg) by mouth every 8 hours   Refills:  0        senna-docusate 8.6-50 MG per tablet   Commonly known as:  SENOKOT-S;PERICOLACE   Dose:  1 tablet   Quantity:  60 tablet        Take 1 tablet by mouth 2 times daily as needed for constipation   Refills:  11        simvastatin 40 MG tablet   Commonly known as:  ZOCOR   Dose:  40 mg   Quantity:  90 tablet        Take 1 tablet (40 mg) by mouth At Bedtime   Refills:  0        traZODone 100 MG tablet   Commonly known as:  DESYREL   Dose:  100 mg   Quantity:  90 tablet        Take 1 tablet (100 mg) by mouth nightly as needed for sleep   Refills:  3        venlafaxine 150 MG Tb24 24 hr tablet   Commonly known as:  EFFEXOR-ER   Dose:  150 mg   Quantity:  90 each        Take 1 tablet (150 mg) by mouth daily (with breakfast)   Refills:  3        warfarin 5 MG tablet   Commonly known as:  COUMADIN   Quantity:  96 tablet        Take 1 tab (5 mg) daily except 1 1/2 tab (7.5 mg) on Wednesdays or as instructed by INR clinic.   Refills:  0                Prescriptions were sent or printed at these locations (2 Prescriptions)                   Other Prescriptions                Printed at Department/Unit printer (2 of 2)         oseltamivir (TAMIFLU) 75 MG capsule               oxyCODONE (ROXICODONE) 5 MG IR tablet                Procedures and tests performed during your visit     Procedure/Test Number of Times Performed    Blood culture 2    CBC with platelets differential 1    Cardiac Continuous Monitoring 1    Comprehensive metabolic panel 1    EKG 12 lead 1    INR 1    Influenza A/B antigen 1    Lactic acid whole blood 1    Pulse oximetry nursing 1    XR Chest 2 Views 1      Orders Needing Specimen Collection     Ordered          03/06/17 1407  UA with  Microscopic - STAT, [facility]: STAT, Needs to be Collected     Scheduled Task Status   03/06/17 1407 Collect UA with Microscopic Open   Order Class:  PCU Collect                  Pending Results     Date and Time Order Name Status Description    3/6/2017 1408 Blood culture In process     3/6/2017 1407 Blood culture Preliminary     3/6/2017 1343 EKG 12 lead Preliminary             Pending Culture Results     Date and Time Order Name Status Description    3/6/2017 1408 Blood culture In process     3/6/2017 1407 Blood culture Preliminary              Test Results from your hospital stay     3/6/2017  2:32 PM - Interface, Flexilab Results      Component Results     Component Value Ref Range & Units Status    WBC 5.2 4.0 - 11.0 10e9/L Final    RBC Count 4.64 4.4 - 5.9 10e12/L Final    Hemoglobin 14.2 13.3 - 17.7 g/dL Final    Hematocrit 42.8 40.0 - 53.0 % Final    MCV 92 78 - 100 fl Final    MCH 30.6 26.5 - 33.0 pg Final    MCHC 33.2 31.5 - 36.5 g/dL Final    RDW 12.8 10.0 - 15.0 % Final    Platelet Count 149 (L) 150 - 450 10e9/L Final    Diff Method Automated Method  Final    % Neutrophils 69.5 % Final    % Lymphocytes 15.5 % Final    % Monocytes 14.0 % Final    % Eosinophils 0.4 % Final    % Basophils 0.4 % Final    % Immature Granulocytes 0.2 % Final    Nucleated RBCs 0 0 /100 Final    Absolute Neutrophil 3.6 1.6 - 8.3 10e9/L Final    Absolute Lymphocytes 0.8 0.8 - 5.3 10e9/L Final    Absolute Monocytes 0.7 0.0 - 1.3 10e9/L Final    Absolute Eosinophils 0.0 0.0 - 0.7 10e9/L Final    Absolute Basophils 0.0 0.0 - 0.2 10e9/L Final    Abs Immature Granulocytes 0.0 0 - 0.4 10e9/L Final    Absolute Nucleated RBC 0.0  Final         3/6/2017  3:09 PM - Interface, Flexilab Results      Component Results     Component Value Ref Range & Units Status    Sodium 141 133 - 144 mmol/L Final    Potassium 4.1 3.4 - 5.3 mmol/L Final    Chloride 104 94 - 109 mmol/L Final    Carbon Dioxide 31 20 - 32 mmol/L Final    Anion Gap 6 3 - 14 mmol/L  Final    Glucose 93 70 - 99 mg/dL Final    Urea Nitrogen 12 7 - 30 mg/dL Final    Creatinine 0.91 0.66 - 1.25 mg/dL Final    GFR Estimate 85 >60 mL/min/1.7m2 Final    Non  GFR Calc    GFR Estimate If Black >90   GFR Calc   >60 mL/min/1.7m2 Final    Calcium 8.0 (L) 8.5 - 10.1 mg/dL Final    Bilirubin Total 1.1 0.2 - 1.3 mg/dL Final    Albumin 3.0 (L) 3.4 - 5.0 g/dL Final    Protein Total 6.2 (L) 6.8 - 8.8 g/dL Final    Alkaline Phosphatase 90 40 - 150 U/L Final    ALT 59 0 - 70 U/L Final    AST 45 0 - 45 U/L Final         3/6/2017  2:36 PM - Interface, Flexilab Results      Component Results     Component Value Ref Range & Units Status    Lactic Acid 1.3 0.7 - 2.1 mmol/L Final         3/6/2017  4:16 PM - Interface, Flexilab Results      Component Results     Component    Specimen Description    Blood Left Arm    Special Requests    Aerobic and anaerobic bottles received    Culture Micro    Pending    Micro Report Status    Pending         3/6/2017  2:47 PM - Interface, Flexilab Results      Component Results     Component Value Ref Range & Units Status    INR 2.39 (H) 0.86 - 1.14 Final         3/6/2017  3:14 PM - Interface, Radiant Ib      Narrative     XR CHEST 2 VW 3/6/2017 3:10 PM    COMPARISON: 3/15/2015    HISTORY: Few        Impression     IMPRESSION: Cardiac silhouette and pulmonary vasculature are within  normal limits. No focal airspace disease, pleural effusion or  pneumothorax.    LENA OSMAN         3/6/2017  2:46 PM - Interface, Flexilab Results      Component Results     Component Value Ref Range & Units Status    Influenza A/B Agn Specimen Nasal  Final    Influenza A Negative NEG Final    Influenza B  NEG Final    Positive   Test results must be correlated with clinical data. If necessary, results   should be confirmed by a molecular assay or viral culture.   (A)         3/6/2017  3:07 PM - Interface, Flexilab Results                Clinical Quality Measure: Blood  Pressure Screening     Your blood pressure was checked while you were in the emergency department today. The last reading we obtained was  BP: 101/56 . Please read the guidelines below about what these numbers mean and what you should do about them.  If your systolic blood pressure (the top number) is less than 120 and your diastolic blood pressure (the bottom number) is less than 80, then your blood pressure is normal. There is nothing more that you need to do about it.  If your systolic blood pressure (the top number) is 120-139 or your diastolic blood pressure (the bottom number) is 80-89, your blood pressure may be higher than it should be. You should have your blood pressure rechecked within a year by a primary care provider.  If your systolic blood pressure (the top number) is 140 or greater or your diastolic blood pressure (the bottom number) is 90 or greater, you may have high blood pressure. High blood pressure is treatable, but if left untreated over time it can put you at risk for heart attack, stroke, or kidney failure. You should have your blood pressure rechecked by a primary care provider within the next 4 weeks.  If your provider in the emergency department today gave you specific instructions to follow-up with your doctor or provider even sooner than that, you should follow that instruction and not wait for up to 4 weeks for your follow-up visit.        Thank you for choosing [facility]       Thank you for choosing [facility] for your care. Our goal is always to provide you with excellent care. Hearing back from our patients is one way we can continue to improve our services. Please take a few minutes to complete the written survey that you may receive in the mail after you visit with us. Thank you!        [facility] Information     [employer] gives you secure access to your electronic health record. If you see a primary care provider, you can also send messages to your care team and make appointments. If you  have questions, please call your primary care clinic.  If you do not have a primary care provider, please call [phone] and they will assist you.        Care EveryWhere ID     This is your Care EveryWhere ID. This could be used by other organizations to access your [facility] medical records  [id number]        After Visit Summary       This is your record. Keep this with you and show to your community pharmacist(s) and doctor(s) at your next visit.

## 2017-03-06 NOTE — ED AVS SNAPSHOT
[facility] Emergency Department    [address]    [facility] [address]    Phone:  [phone]    Fax:  [phone]                                       Todd S Aschoff   MRN: [id number]    Department:  [facility] Emergency Department   Date of Visit:  3/6/2017           After Visit Summary Signature Page     I have received my discharge instructions, and my questions have been answered. I have discussed any challenges I see with this plan with the nurse or doctor.    ..........................................................................................................................................  Patient/Patient Representative Signature      ..........................................................................................................................................  Patient Representative Print Name and Relationship to Patient    ..................................................               ................................................  Date                                            Time    ..........................................................................................................................................  Reviewed by Signature/Title    ...................................................              ..............................................  Date                                                            Time

## 2017-03-07 DIAGNOSIS — R97.20 ELEVATED PROSTATE SPECIFIC ANTIGEN (PSA): Primary | ICD-10-CM

## 2017-03-07 DIAGNOSIS — Z79.01 CURRENT USE OF LONG TERM ANTICOAGULATION: ICD-10-CM

## 2017-03-07 LAB — INTERPRETATION ECG - MUSE: NORMAL

## 2017-03-07 RX ORDER — WARFARIN SODIUM 5 MG/1
TABLET ORAL
Qty: 35 TABLET | Refills: 0 | Status: SHIPPED | OUTPATIENT
Start: 2017-03-07 | End: 2017-04-06

## 2017-03-07 NOTE — TELEPHONE ENCOUNTER
Warfarin 5 mg    Last Written Prescription Date: 11/25/16  Last Fill Qty: 96, # refills: 0  Last Office Visit with [facility] or [facility] prescribing provider: 1/8/16    Date and Result of Last PT/INR:   Lab Results   Component Value Date    INR 2.39 03/06/2017    INR 3.0 02/08/2017    INR 2.32 02/06/2017    INR 2.5 01/31/2017    PT 3.3@ 06/29/2010   Medication is being filled for 1 time refill only due to:  Patient needs to be seen because it has been more than one year since last visit. Pt is overdue for INR  Lucina Andrew RN

## 2017-03-10 DIAGNOSIS — R41.3 MEMORY DIFFICULTIES: ICD-10-CM

## 2017-03-10 NOTE — TELEPHONE ENCOUNTER
Donepezil HCL      Last Written Prescription Date: 11/28/16  Last Fill Quantity: 90,  # refills: 0   Last Office Visit with [facility], [facility] or [facility] prescribing provider: 1/8/16

## 2017-03-12 LAB
BACTERIA SPEC CULT: NO GROWTH
BACTERIA SPEC CULT: NO GROWTH
Lab: NORMAL
Lab: NORMAL
MICRO REPORT STATUS: NORMAL
MICRO REPORT STATUS: NORMAL
SPECIMEN SOURCE: NORMAL
SPECIMEN SOURCE: NORMAL

## 2017-03-13 ENCOUNTER — HOSPITAL ENCOUNTER (EMERGENCY)
Facility: CLINIC | Age: 62
Discharge: HOME | End: 2017-03-13
Attending: EMERGENCY MEDICINE | Admitting: EMERGENCY MEDICINE
Payer: COMMERCIAL

## 2017-03-13 VITALS
RESPIRATION RATE: 18 BRPM | TEMPERATURE: 98 F | DIASTOLIC BLOOD PRESSURE: 76 MMHG | SYSTOLIC BLOOD PRESSURE: 115 MMHG | OXYGEN SATURATION: 99 % | HEART RATE: 71 BPM

## 2017-03-13 DIAGNOSIS — R79.1 SUPRATHERAPEUTIC INR: ICD-10-CM

## 2017-03-13 DIAGNOSIS — L76.31 POSTOPERATIVE HEMATOMA OF SKIN FOLLOWING DERMATOLOGIC PROCEDURE: ICD-10-CM

## 2017-03-13 LAB
ERYTHROCYTE [DISTWIDTH] IN BLOOD BY AUTOMATED COUNT: 13 % (ref 10–15)
HCT VFR BLD AUTO: 40.6 % (ref 40–53)
HGB BLD-MCNC: 14.1 G/DL (ref 13.3–17.7)
INR PPP: 5.53 (ref 0.86–1.14)
MCH RBC QN AUTO: 31.7 PG (ref 26.5–33)
MCHC RBC AUTO-ENTMCNC: 34.7 G/DL (ref 31.5–36.5)
MCV RBC AUTO: 91 FL (ref 78–100)
PLATELET # BLD AUTO: 278 10E9/L (ref 150–450)
RBC # BLD AUTO: 4.45 10E12/L (ref 4.4–5.9)
WBC # BLD AUTO: 7 10E9/L (ref 4–11)

## 2017-03-13 PROCEDURE — 85610 PROTHROMBIN TIME: CPT | Performed by: EMERGENCY MEDICINE

## 2017-03-13 PROCEDURE — 99283 EMERGENCY DEPT VISIT LOW MDM: CPT

## 2017-03-13 PROCEDURE — 25000132 ZZH RX MED GY IP 250 OP 250 PS 637: Performed by: EMERGENCY MEDICINE

## 2017-03-13 PROCEDURE — 85027 COMPLETE CBC AUTOMATED: CPT | Performed by: EMERGENCY MEDICINE

## 2017-03-13 PROCEDURE — 36415 COLL VENOUS BLD VENIPUNCTURE: CPT | Performed by: EMERGENCY MEDICINE

## 2017-03-13 RX ORDER — OXYCODONE HYDROCHLORIDE 5 MG/1
10 TABLET ORAL ONCE
Status: COMPLETED | OUTPATIENT
Start: 2017-03-13 | End: 2017-03-13

## 2017-03-13 RX ORDER — DONEPEZIL HYDROCHLORIDE 10 MG/1
10 TABLET, FILM COATED ORAL AT BEDTIME
Qty: 90 TABLET | Refills: 1 | Status: SHIPPED | OUTPATIENT
Start: 2017-03-13 | End: 2017-09-08

## 2017-03-13 RX ORDER — OXYCODONE AND ACETAMINOPHEN 5; 325 MG/1; MG/1
1-2 TABLET ORAL EVERY 6 HOURS PRN
Qty: 15 TABLET | Refills: 0 | Status: SHIPPED | OUTPATIENT
Start: 2017-03-13 | End: 2017-04-28

## 2017-03-13 RX ADMIN — OXYCODONE HYDROCHLORIDE 10 MG: 5 TABLET ORAL at 18:38

## 2017-03-13 ASSESSMENT — PAIN DESCRIPTION - DESCRIPTORS: DESCRIPTORS: ACHING

## 2017-03-13 ASSESSMENT — ENCOUNTER SYMPTOMS: WOUND: 1

## 2017-03-13 NOTE — ED NOTES
Patient presents to the ED reporting bleeding from an incision. States had a cyst removed on March 6th. Reports that wound has had problems with continued bleeding. On coumadin. Bleeding began again yesterday and has been unable to get it stopped. Family states stitches seem to be out.

## 2017-03-13 NOTE — ED PROVIDER NOTES
History     Chief Complaint:  Post-op problem    HPI   Todd S Aschoff is a 61 year old male with complex medical history currently on coumadin for artificial mechanical aortic valve who presents to the emergency department today for evaluation of post-op problem. Mr. Aschoff had a 6 in cyst removed from his mid-back 3/6/17 at [facility]. Two weeks prior, he experienced a lot of pain in the area and has had worsening cough and generalized weakness as well as other cold/cough symptoms prompting visit to ER on 3/6/17. Patient was discharged home after being diagnosed with Influenza B. After discharge, patient continued to have intermittent bleeding from incision site. Since yesterday, patient's pain began to worsen with continual bleeding from site. Of note, spouse states after surgical procedures patient typically has an infection. Patient is scheduled with surgeon in 4 days.     Allergies:  No known drug allergies       Medications:    Acetaminophen  Astelin  Tenex  Effexor  Trazodone  Senokot  Mirabegron  Midrim  Zyrtec  Coumadin  Aricept  Valium  Enoxaparin  Simvastatin  Norco  Cipro  Flexeril   Coumadin  Tylenol #3  Cipro      Past Medical History:    Chronic infection  Chronic pain  Numbness and tingling  Antiplatelet or antithrombotic long-term use  Low back pain  Blood transfusion  Heart murmur  BPH  Alcohol dependence  Major depression  Aortic valve prosthesis present  OA  Mixed hyperlipidemia  Sciatica  Dissection of aorta  Atrial fibrillation  Unspecified hypothyroidism  Allergy  Headache  Obesity       Past Surgical History:   Colonoscopy x2  Explore spine, remove hardware  Decompression lumbar one level  Irrigation and debridement spine  Fusion cervical anterior two levels  [place] teeth removal  Tonsillectomy  Cataract iol  Cholecystectomy  Aortic valve replacement  Right hip replacement  Repair TAA with graft      Family History:   Father: Cerebrovascular disease, prostate  cancer  Mother: Lymphoma  Brother: Hypertension, sleep apnea  Sister: Hypertension      Social History:  Presents alone   Tobacco use: Never  Alcohol use: Stopped 2009  PCP: Obdulio Ingram MD    Marital Status:       Review of Systems   Skin: Positive for wound.   All other systems reviewed and are negative.    Physical Exam   First Vitals:  BP: 118/89  Pulse: 78  Temp: 98  F (36.7  C)  Resp: 18  SpO2: 98 %    Physical Exam   Skin:            General:   Pleasant, age appropriate.  Eyes:    Conjunctiva normal, PERRL  Neck:    Supple, no meningismus.     CV:     Regular rate and rhythm.      Mechanical S2     No murmurs, rubs or gallops.       No  lower extremity edema.  PULM:    Clear to auscultation bilateral.       No respiratory distress.      Good air exchange.  ABD:    Soft, non-tender, non-distended.       No rebound, guarding or rigidity.  MSK:     No gross deformity to all four extremities.   LYMPH:   No cervical lymphadenopathy.  NEURO:   Alert, good muscular tone, no atrophy.   Skin:    Warm, dry  Psych:    Mood is good and affect is appropriate.      Emergency Department Course   Laboratory:  CBC:  WBC 7.0, HGB 1.1,   INR: 5.53 (HH)    Interventions:  18:38 Oxycodone 10 mg Oral    Emergency Department Course:  Nursing notes and vitals reviewed.    18:14 I performed an exam of the patient as documented above.     18:38 The patient received the intervention(s) above.    18:57 Blood drawn. This was sent to the lab for further testing, results above.    19:23 Recheck patient.. I personally reviewed some of the aboratory results with the Patient and spouse and answered all related questions.    19:35 Recheck patient. I personally reviewed the rest of the laboratory results with the Patient and spouse and answered all related questions prior to discharge. Plan explained to the Patient and spouse. Patient discharged home with instructions regarding supportive care, medications, and reasons to  return. The importance of close follow-up was reviewed.  Impression & Plan    Medical Decision Making:  Todd S Aschoff is a 61 year old male presenting to ED with post op bleeding and pain at the site of his soft tissue cyst removal. He has a clear hematoma with no active bleeping. There is no evidence of secondary infection such as abscess or cellulitis. Basic laboratories were checked, which shows no evidence of acute anemia. INR is supratherapeutic at 5.5, likely leading to the hematoma and increased bleeding at home. We will have him hold his warfarin for the next 2 days. Short tern analgesics and close follow up with PCP for further guidance on warfarin dosing.     Diagnosis:    ICD-10-CM    1. Postoperative hematoma of skin following dermatologic procedure L76.31    2. Supratherapeutic INR R79.1        Disposition:  discharged to home    Discharge Medications:  Discharge Medication List as of 3/13/2017  8:26 PM      START taking these medications    Details   oxyCODONE-acetaminophen (PERCOCET) 5-325 MG per tablet Take 1-2 tablets by mouth every 6 hours as needed for moderate to severe pain, Disp-15 tablet, R-0, Local Print             Scribe Disclosure:  I, Beatriz Molina, am serving as a scribe at 6:14 PM on 3/13/2017 to document services personally performed by Simone Willis MD, based on my observations and the provider's statements to me.  Beatriz Molina  3/13/2017   [facility] EMERGENCY DEPARTMENT       Simone Willis MD  03/14/17 0039

## 2017-03-13 NOTE — ED AVS SNAPSHOT
[facility] Emergency Department    [address]    [facility] [address]    Phone:  [phone]    Fax:  [phone]                                       Todd S Aschoff   MRN: [id number]    Department:  [facility] Emergency Department   Date of Visit:  3/13/2017           Patient Information     Date Of Birth          1955        Your diagnoses for this visit were:     Postoperative hematoma of skin following dermatologic procedure     Supratherapeutic INR        You were seen by Simone Willis MD.      Follow-up Information     Follow up with Obdulio Ingram MD. Call in 1 day.    Specialty:  Internal Medicine    Contact information:    [facility]  [address]  [facility] [id number] [phone]          Discharge Instructions       DO NOT TAKE YOUR COUMADIN FOR THE NEXT 2 DAYS AND THEN RESUME YOUR NORMAL SCHEDULE AS GUIDED BY YOUR REGULAR DOCTOR.  YOUR INR TODAY WAS 5.5.        Discharge References/Attachments     HEMATOMA (ENGLISH)    POST OP WOUND CHECK, BLEEDING (ENGLISH)      Future Appointments        Provider Department Dept Phone Center    6/8/2017 9:00 AM Geraldo Gonzales MD [facility] [place] [phone] [facility]      24 Hour Appointment Hotline       To make an appointment at any [facility], call [phone] ([phone]). If you don't have a family doctor or clinic, we will help you find one. [facility] are conveniently located to serve the needs of you and your family.             Review of your medicines      START taking        Dose / Directions Last dose taken    oxyCODONE-acetaminophen 5-325 MG per tablet   Commonly known as:  PERCOCET   Dose:  1-2 tablet   Quantity:  15 tablet        Take 1-2 tablets by mouth every 6 hours as needed for moderate to severe pain   Refills:  0          Our records show that you are taking the medicines listed below. If these are  incorrect, please call your family doctor or clinic.        Dose / Directions Last dose taken    acetaminophen 650 MG 8 hour tablet   Commonly known as:  ACETAMINOPHEN 8 HOUR   Dose:  650 mg   Quantity:  250 tablet        Take 650 mg by mouth 2 times daily   Refills:  3        azelastine 0.1 % spray   Commonly known as:  ASTELIN   Dose:  2 spray   Quantity:  1 Bottle        Spray 2 sprays in nostril 2 times daily as needed   Refills:  11        cetirizine 10 MG tablet   Commonly known as:  zyrTEC   Dose:  10 mg   Quantity:  90 tablet        Take 1 tablet (10 mg) by mouth daily as needed   Refills:  3        CIPROFLOXACIN PO   Dose:  500 mg        Take 500 mg by mouth   Refills:  0        cyclobenzaprine 10 MG tablet   Commonly known as:  FLEXERIL   Dose:  10 mg   Quantity:  270 tablet        Take 1 tablet (10 mg) by mouth 3 times daily as needed for muscle spasms   Refills:  0        diazepam 5 MG tablet   Commonly known as:  VALIUM   Dose:  5 mg   Quantity:  270 tablet        Take 1 tablet (5 mg) by mouth every 8 hours as needed for anxiety or muscle spasms Maximum #270 every 90 days.   Refills:  0        donepezil 10 MG tablet   Commonly known as:  ARICEPT   Dose:  10 mg   Quantity:  90 tablet        Take 1 tablet (10 mg) by mouth At Bedtime   Refills:  1        enoxaparin 120 MG/0.8ML injection   Commonly known as:  enoxaparin   Dose:  1 mg/kg   Quantity:  20 Syringe        Inject 0.9 mLs (135 mg) Subcutaneous every 12 hours   Refills:  1        guanFACINE 1 MG tablet   Commonly known as:  TENEX   Dose:  1 mg   Quantity:  90 tablet        Take 1 tablet (1 mg) by mouth At Bedtime   Refills:  3        HYDROcodone-acetaminophen 5-325 MG per tablet   Commonly known as:  NORCO   Dose:  1 tablet   Quantity:  15 tablet        Take 1 tablet by mouth every 6 hours as needed for pain   Refills:  0        isometheptene-acetaminophen-dichloralphenazone -100 MG per capsule   Commonly known as:  MIDRIN   Dose:  1 capsule    Quantity:  30 capsule        Take 1 capsule by mouth 4 times daily as needed   Refills:  0        levothyroxine 150 MCG tablet   Commonly known as:  SYNTHROID/LEVOTHROID   Dose:  150 mcg   Quantity:  90 tablet        Take 1 tablet (150 mcg) by mouth daily   Refills:  0        mirabegron 50 MG 24 hr tablet   Commonly known as:  MYRBETRIQ   Dose:  50 mg   Quantity:  90 tablet        Take 1 tablet (50 mg) by mouth daily   Refills:  0        oxyCODONE 5 MG IR tablet   Commonly known as:  ROXICODONE   Dose:  5 mg   Quantity:  10 tablet        Take 1 tablet (5 mg) by mouth every 6 hours as needed for pain   Refills:  0        pregabalin 100 MG capsule   Commonly known as:  LYRICA   Dose:  100 mg   Quantity:  270 capsule        Take 1 capsule (100 mg) by mouth 3 times daily Do not give if somnolent/sedated.   Refills:  0        propafenone 150 MG Tabs tablet   Commonly known as:  RYTHMOL   Dose:  150 mg   Quantity:  270 tablet        Take 1 tablet (150 mg) by mouth every 8 hours   Refills:  0        senna-docusate 8.6-50 MG per tablet   Commonly known as:  SENOKOT-S;PERICOLACE   Dose:  1 tablet   Quantity:  60 tablet        Take 1 tablet by mouth 2 times daily as needed for constipation   Refills:  11        simvastatin 40 MG tablet   Commonly known as:  ZOCOR   Dose:  40 mg   Quantity:  90 tablet        Take 1 tablet (40 mg) by mouth At Bedtime   Refills:  0        traZODone 100 MG tablet   Commonly known as:  DESYREL   Dose:  100 mg   Quantity:  90 tablet        Take 1 tablet (100 mg) by mouth nightly as needed for sleep   Refills:  3        venlafaxine 150 MG Tb24 24 hr tablet   Commonly known as:  EFFEXOR-ER   Dose:  150 mg   Quantity:  90 each        Take 1 tablet (150 mg) by mouth daily (with breakfast)   Refills:  3        warfarin 5 MG tablet   Commonly known as:  COUMADIN   Quantity:  35 tablet        Take 1 tab (5 mg) daily except 1 1/2 tab (7.5 mg) on Wednesdays and Saturdays or as instructed by INR clinic.    Refills:  0                Prescriptions were sent or printed at these locations (1 Prescription)                   Other Prescriptions                Printed at Department/Unit printer (1 of 1)         oxyCODONE-acetaminophen (PERCOCET) 5-325 MG per tablet                Procedures and tests performed during your visit     CBC (platelets, no diff)    INR      Orders Needing Specimen Collection     None      Pending Results     No orders found from 3/11/2017 to 3/14/2017.            Pending Culture Results     No orders found from 3/11/2017 to 3/14/2017.             Test Results from your hospital stay     3/13/2017  7:08 PM - Interface, Flexilab Results      Component Results     Component Value Ref Range & Units Status    WBC 7.0 4.0 - 11.0 10e9/L Final    RBC Count 4.45 4.4 - 5.9 10e12/L Final    Hemoglobin 14.1 13.3 - 17.7 g/dL Final    Hematocrit 40.6 40.0 - 53.0 % Final    MCV 91 78 - 100 fl Final    MCH 31.7 26.5 - 33.0 pg Final    MCHC 34.7 31.5 - 36.5 g/dL Final    RDW 13.0 10.0 - 15.0 % Final    Platelet Count 278 150 - 450 10e9/L Final         3/13/2017  7:33 PM - Interface, Flexilab Results      Component Results     Component Value Ref Range & Units Status    INR 5.53 (HH) 0.86 - 1.14 Final    Critical Value called to and read back by  HILARIO LOPEZ) ON 3/1/317 AT 1933 BY TB                  Clinical Quality Measure: Blood Pressure Screening     Your blood pressure was checked while you were in the emergency department today. The last reading we obtained was  BP: 118/89 . Please read the guidelines below about what these numbers mean and what you should do about them.  If your systolic blood pressure (the top number) is less than 120 and your diastolic blood pressure (the bottom number) is less than 80, then your blood pressure is normal. There is nothing more that you need to do about it.  If your systolic blood pressure (the top number) is 120-139 or your diastolic blood pressure (the bottom  number) is 80-89, your blood pressure may be higher than it should be. You should have your blood pressure rechecked within a year by a primary care provider.  If your systolic blood pressure (the top number) is 140 or greater or your diastolic blood pressure (the bottom number) is 90 or greater, you may have high blood pressure. High blood pressure is treatable, but if left untreated over time it can put you at risk for heart attack, stroke, or kidney failure. You should have your blood pressure rechecked by a primary care provider within the next 4 weeks.  If your provider in the emergency department today gave you specific instructions to follow-up with your doctor or provider even sooner than that, you should follow that instruction and not wait for up to 4 weeks for your follow-up visit.        Thank you for choosing [facility]       Thank you for choosing [facility] for your care. Our goal is always to provide you with excellent care. Hearing back from our patients is one way we can continue to improve our services. Please take a few minutes to complete the written survey that you may receive in the mail after you visit with us. Thank you!        [facility] Information     [facility] gives you secure access to your electronic health record. If you see a primary care provider, you can also send messages to your care team and make appointments. If you have questions, please call your primary care clinic.  If you do not have a primary care provider, please call [phone] and they will assist you.        Care EveryWhere ID     This is your Care EveryWhere ID. This could be used by other organizations to access your [facility] medical records  [id number]        After Visit Summary       This is your record. Keep this with you and show to your community pharmacist(s) and doctor(s) at your next visit.

## 2017-03-14 ENCOUNTER — ANTICOAGULATION THERAPY VISIT (OUTPATIENT)
Dept: ANTICOAGULATION | Facility: CLINIC | Age: 62
End: 2017-03-14
Payer: COMMERCIAL

## 2017-03-14 DIAGNOSIS — Z95.2 AORTIC VALVE PROSTHESIS PRESENT: ICD-10-CM

## 2017-03-14 DIAGNOSIS — Z79.01 LONG-TERM (CURRENT) USE OF ANTICOAGULANTS: ICD-10-CM

## 2017-03-14 PROCEDURE — 99207 ZZC NO CHARGE NURSE ONLY: CPT | Performed by: INTERNAL MEDICINE

## 2017-03-14 NOTE — MR AVS SNAPSHOT
Todd S Aschoff   3/14/2017   Anticoagulation Therapy Visit    Description:  61 year old male   Provider:  Obdulio Ingram MD   Department:  [facility]           INR as of 3/14/2017     Today's INR 5.53! (3/13/2017)      Anticoagulation Summary as of 3/14/2017     INR goal 2.5-3.5   Today's INR 5.53! (3/13/2017)   Full instructions 3/14: Hold; 3/15: 5 mg; Otherwise 7.5 mg on Wed, Sat; 5 mg all other days   Next INR check 3/23/2017    Indications   Long-term (current) use of anticoagulants [Z79.01] [Z79.01]  Aortic valve prosthesis present [Z95.2]  Atrial fibrillation (H) [I48.91]         Your next Anticoagulation Clinic appointment(s)     Mar 23, 2017  1:30 PM CDT   Anticoagulation Visit with [facility]   [facility]    [address] [facility] [address]  [facility] [address] [phone]              Contact Numbers     [facility] Phone Numbers:  Anticoagulation Clinic Appointments : [phone]  Anticoagulation Nurse: [phone]         March 2017 Details    Sun Mon Tue Wed Thu Fri Sat        1               2               3               4                 5               6               7               8               9               10               11                 12               13               14      Hold   See details      15      5 mg         16      5 mg         17      5 mg         18      7.5 mg           19      5 mg         20      5 mg         21      5 mg         22      7.5 mg         23            24               25                 26               27               28               29               30               31                 Date Details   03/14 This INR check       Date of next INR:  3/23/2017         How to take your warfarin dose     To take:  5 mg Take 1 of the 5 mg tablets.    To take:  7.5 mg Take 1.5 of the 5 mg tablets.    Hold Do not take your warfarin dose. See the Details table to the right  for additional instructions.

## 2017-03-14 NOTE — PROGRESS NOTES
ANTICOAGULATION FOLLOW-UP CLINIC VISIT    Patient Name:  Todd S Aschoff  Date:  3/14/2017  Contact Type:  Telephone/ Pt called, left message stating was at ED yesterday and INR was high. Called pt, discussed changes and orders.     SUBJECTIVE:     Patient Findings     Positives Bruising (hematoma at surgical site on back (cyst removal 3/3/17) ), Antibiotic use or infection (abx since 3/3/17, completed 2-3 days ago, pt does not remember whcich abx)           OBJECTIVE    INR   Date Value Ref Range Status   03/13/2017 5.53 (HH) 0.86 - 1.14 Final     Comment:     Critical Value called to and read back by  HILARIO MONGE (MARIA TERESA) ON 3/1/317 AT 1933 BY TB         ASSESSMENT / PLAN  INR assessment SUPRA    Recheck INR In: 9 DAYS    INR Location Outside lab      Anticoagulation Summary as of 3/14/2017     INR goal 2.5-3.5   Today's INR 5.53! (3/13/2017)   Maintenance plan 7.5 mg (5 mg x 1.5) on Wed, Sat; 5 mg (5 mg x 1) all other days   Full instructions 3/14: Hold; 3/15: 5 mg; Otherwise 7.5 mg on Wed, Sat; 5 mg all other days   Weekly total 40 mg   Plan last modified Amanda Wheeler RN (2/8/2017)   Next INR check 3/23/2017   Priority INR   Target end date     Indications   Long-term (current) use of anticoagulants [Z79.01] [Z79.01]  Aortic valve prosthesis present [Z95.2]  Atrial fibrillation (H) [I48.91]         Anticoagulation Episode Summary     INR check location     Preferred lab     Send INR reminders to [facility]    Comments       Anticoagulation Care Providers     Provider Role Specialty Phone number    Obdulio Ingram MD Responsible Internal Medicine [phone]            See the Encounter Report to view Anticoagulation Flowsheet and Dosing Calendar (Go to Encounters tab in chart review, and find the Anticoagulation Therapy Visit)    Dosage adjustment made based on physician directed care plan.    Lucina Andrew RN

## 2017-03-23 ENCOUNTER — ANTICOAGULATION THERAPY VISIT (OUTPATIENT)
Dept: ANTICOAGULATION | Facility: CLINIC | Age: 62
End: 2017-03-23
Payer: COMMERCIAL

## 2017-03-23 DIAGNOSIS — Z79.01 LONG-TERM (CURRENT) USE OF ANTICOAGULANTS: ICD-10-CM

## 2017-03-23 DIAGNOSIS — Z95.2 AORTIC VALVE PROSTHESIS PRESENT: ICD-10-CM

## 2017-03-23 DIAGNOSIS — R79.1 ELEVATED INR: Primary | ICD-10-CM

## 2017-03-23 LAB
INR POINT OF CARE: >8 (ref 0.86–1.14)
INR PPP: 9.14 (ref 0.86–1.14)

## 2017-03-23 PROCEDURE — 85610 PROTHROMBIN TIME: CPT | Performed by: INTERNAL MEDICINE

## 2017-03-23 PROCEDURE — 99207 ZZC NO CHARGE NURSE ONLY: CPT

## 2017-03-23 PROCEDURE — 36416 COLLJ CAPILLARY BLOOD SPEC: CPT

## 2017-03-23 PROCEDURE — 85610 PROTHROMBIN TIME: CPT | Mod: QW

## 2017-03-23 NOTE — PROGRESS NOTES
ANTICOAGULATION FOLLOW-UP CLINIC VISIT    Patient Name:  Todd S Aschoff  Date:  3/23/2017  Contact Type:  Face to Face    SUBJECTIVE:     Patient Findings     Positives Antibiotic use or infection (Pt reports taking a different abx d/t to cyst removal on back, does not remember  what abx.)           OBJECTIVE    INR Protime   Date Value Ref Range Status   03/23/2017 >8 0.86 - 1.14 Final     Comment:     Pt sent to lab for venous draw       ASSESSMENT / PLAN  No question data found.  Anticoagulation Summary as of 3/23/2017     INR goal 2.5-3.5   Today's INR >8!   Maintenance plan 7.5 mg (5 mg x 1.5) on Wed, Sat; 5 mg (5 mg x 1) all other days   Full instructions 7.5 mg on Wed, Sat; 5 mg all other days   Weekly total 40 mg   Plan last modified Amanda Wheeler RN (2/8/2017)   Next INR check    Priority INR   Target end date     Indications   Long-term (current) use of anticoagulants [Z79.01] [Z79.01]  Aortic valve prosthesis present [Z95.2]  Atrial fibrillation (H) [I48.91]         Anticoagulation Episode Summary     INR check location     Preferred lab     Send INR reminders to [facility]    Comments       Anticoagulation Care Providers     Provider Role Specialty Phone number    Obdulio Ingram MD Responsible Internal Medicine [phone]            See the Encounter Report to view Anticoagulation Flowsheet and Dosing Calendar (Go to Encounters tab in chart review, and find the Anticoagulation Therapy Visit)    Dosage adjustment made based on physician directed care plan.    Lucina Andrew RN

## 2017-03-23 NOTE — MR AVS SNAPSHOT
Todd S Aschoff   3/23/2017 1:30 PM   Anticoagulation Therapy Visit    Description:  61 year old male   Provider:  [facility]   Department:  [facility]           INR as of 3/23/2017     Today's INR >8!      Anticoagulation Summary as of 3/23/2017     INR goal 2.5-3.5   Today's INR >8!   Full instructions 3/23: Hold; 3/24: Hold; 3/25: 5 mg; Otherwise 7.5 mg on Wed, Sat; 5 mg all other days   Next INR check 3/27/2017    Indications   Long-term (current) use of anticoagulants [Z79.01] [Z79.01]  Aortic valve prosthesis present [Z95.2]  Atrial fibrillation (H) [I48.91]         Your next Anticoagulation Clinic appointment(s)     Mar 27, 2017  3:45 PM CDT   [facility]   [facility]    [address] [facility] [address]  [facility] [id number] [phone]              Contact Numbers     [facility] Phone Numbers:  Anticoagulation Clinic Appointments : [phone]  Anticoagulation Nurse: [phone]         March 2017 Details    Sun Mon Tue Wed Thu Fri Sat        1               2               3               4                 5               6               7               8               9               10               11                 12               13               14               15               16               17               18                 19               20               21               22               23      Hold   See details      24      Hold         25      5 mg           26      5 mg         27            28               29               30               31                 Date Details   03/23 This INR check       Date of next INR:  3/27/2017         How to take your warfarin dose     To take:  5 mg Take 1 of the 5 mg tablets.    Hold Do not take your warfarin dose. See the Details table to the right for additional instructions.

## 2017-03-24 NOTE — PROGRESS NOTES
Discussed INR venous draw results with patient.  Advised to hold 1 more day on Saturday.  Pt reports that he is taking Bactrim DS BID for 30 days (started last week).  Amanda Wheeler RN

## 2017-03-27 ENCOUNTER — ANTICOAGULATION THERAPY VISIT (OUTPATIENT)
Dept: ANTICOAGULATION | Facility: CLINIC | Age: 62
End: 2017-03-27
Payer: COMMERCIAL

## 2017-03-27 DIAGNOSIS — Z79.01 LONG-TERM (CURRENT) USE OF ANTICOAGULANTS: ICD-10-CM

## 2017-03-27 DIAGNOSIS — Z95.2 AORTIC VALVE PROSTHESIS PRESENT: ICD-10-CM

## 2017-03-27 LAB — INR PPP: 2.2

## 2017-03-27 PROCEDURE — 99207 ZZC NO CHARGE NURSE ONLY: CPT

## 2017-03-27 NOTE — MR AVS SNAPSHOT
After Visit Summary   3/27/2017    Todd S Aschoff    MRN: [id number]           Patient Information     Date Of Birth          1955        Visit Information        Provider Department      3/27/2017 3:45 PM [facility]        Today's Diagnoses     Long-term (current) use of anticoagulants        Aortic valve prosthesis present           Follow-ups after your visit        Your next 10 appointments already scheduled     Mar 27, 2017  3:45 PM CDT   [facility]   [facility]    [address]  [facility] [id number] [phone]            Apr 03, 2017  3:15 PM CDT   [facility]   [facility]    [address]  [facility] [id number] [phone]            Jun 08, 2017  9:00 AM CDT   Return Visit with Geraldo Gonzales MD   [facility] ([facility])    [address]  [address]  [facility] [id number] [phone]              Who to contact     If you have questions or need follow up information about today's clinic visit or your schedule please contact [facility] directly at [phone].  Normal or non-critical lab and imaging results will be communicated to you by MyChart, letter or phone within 4 business days after the clinic has received the results. If you do not hear from us within 7 days, please contact the clinic through MyChart or phone. If you have a critical or abnormal lab result, we will notify you by phone as soon as possible.  Submit refill requests through [employer] or call your pharmacy and they will forward the refill request to us. Please allow 3 business days for your refill to be completed.          Additional Information About Your Visit         [employer] Information     [employer] gives you secure access to your electronic health record. If you see a primary care provider, you can also send messages to your care team and make appointments. If you have questions, please call your primary care clinic.  If you do not have a primary care provider, please call [phone] and they will assist you.        Care EveryWhere ID     This is your Care EveryWhere ID. This could be used by other organizations to access your [place] medical records  [id number]         Blood Pressure from Last 3 Encounters:   03/13/17 115/76   03/06/17 101/56   02/06/17 121/84    Weight from Last 3 Encounters:   03/06/17 (!) 307 lb (139.3 kg)   02/08/17 (!) 307 lb (139.3 kg)   02/06/17 (!) 307 lb (139.3 kg)              We Performed the Following     INR        Primary Care Provider Office Phone # Fax #    Obdulio Ingram [phone]       [facility] [address] [place] [address]        Thank you!     Thank you for choosing [facility]  for your care. Our goal is always to provide you with excellent care. Hearing back from our patients is one way we can continue to improve our services. Please take a few minutes to complete the written survey that you may receive in the mail after your visit with us. Thank you!             Your Updated Medication List - Protect others around you: Learn how to safely use, store and throw away your medicines at www.disposemymeds.org.          This list is accurate as of: 3/27/17  9:55 AM.  Always use your most recent med list.                   Brand Name Dispense Instructions for use    acetaminophen 650 MG 8 hour tablet    ACETAMINOPHEN 8 HOUR    250 tablet    Take 650 mg by mouth 2 times daily       azelastine 0.1 % spray    ASTELIN    1 Bottle    Spray 2 sprays in nostril 2 times daily as needed       cetirizine 10 MG tablet    zyrTEC    90 tablet    Take 1 tablet (10 mg) by mouth daily as  needed       cyclobenzaprine 10 MG tablet    FLEXERIL    270 tablet    Take 1 tablet (10 mg) by mouth 3 times daily as needed for muscle spasms       diazepam 5 MG tablet    VALIUM    270 tablet    Take 1 tablet (5 mg) by mouth every 8 hours as needed for anxiety or muscle spasms Maximum #270 every 90 days.       donepezil 10 MG tablet    ARICEPT    90 tablet    Take 1 tablet (10 mg) by mouth At Bedtime       enoxaparin 120 MG/0.8ML injection    enoxaparin    20 Syringe    Inject 0.9 mLs (135 mg) Subcutaneous every 12 hours       guanFACINE 1 MG tablet    TENEX    90 tablet    Take 1 tablet (1 mg) by mouth At Bedtime       HYDROcodone-acetaminophen 5-325 MG per tablet    NORCO    15 tablet    Take 1 tablet by mouth every 6 hours as needed for pain       isometheptene-acetaminophen-dichloralphenazone -100 MG per capsule    MIDRIN    30 capsule    Take 1 capsule by mouth 4 times daily as needed       levothyroxine 150 MCG tablet    SYNTHROID/LEVOTHROID    90 tablet    Take 1 tablet (150 mcg) by mouth daily       mirabegron 50 MG 24 hr tablet    MYRBETRIQ    90 tablet    Take 1 tablet (50 mg) by mouth daily       oxyCODONE 5 MG IR tablet    ROXICODONE    10 tablet    Take 1 tablet (5 mg) by mouth every 6 hours as needed for pain       oxyCODONE-acetaminophen 5-325 MG per tablet    PERCOCET    15 tablet    Take 1-2 tablets by mouth every 6 hours as needed for moderate to severe pain       pregabalin 100 MG capsule    LYRICA    270 capsule    Take 1 capsule (100 mg) by mouth 3 times daily Do not give if somnolent/sedated.       propafenone 150 MG Tabs tablet    RYTHMOL    270 tablet    Take 1 tablet (150 mg) by mouth every 8 hours       senna-docusate 8.6-50 MG per tablet    SENOKOT-S;PERICOLACE    60 tablet    Take 1 tablet by mouth 2 times daily as needed for constipation       simvastatin 40 MG tablet    ZOCOR    90 tablet    Take 1 tablet (40 mg) by mouth At Bedtime       traZODone 100 MG tablet    DESYREL     90 tablet    Take 1 tablet (100 mg) by mouth nightly as needed for sleep       venlafaxine 150 MG Tb24 24 hr tablet    EFFEXOR-ER    90 each    Take 1 tablet (150 mg) by mouth daily (with breakfast)       warfarin 5 MG tablet    COUMADIN    35 tablet    Take 1 tab (5 mg) daily except 1 1/2 tab (7.5 mg) on Wednesdays and Saturdays or as instructed by INR clinic.

## 2017-03-27 NOTE — PROGRESS NOTES
ANTICOAGULATION FOLLOW-UP CLINIC VISIT    Patient Name:  Todd S Aschoff  Date:  3/27/2017  Contact Type:  Face to Face    SUBJECTIVE:     Patient Findings     Positives Other complaints           OBJECTIVE    INR   Date Value Ref Range Status   03/27/2017 2.2  Final       ASSESSMENT / PLAN  INR assessment SUB    Recheck INR In: 1 WEEK    INR Location Clinic      Anticoagulation Summary as of 3/27/2017     INR goal 2.5-3.5   Today's INR 2.2!   Maintenance plan 7.5 mg (5 mg x 1.5) on Wed, Sat; 5 mg (5 mg x 1) all other days   Full instructions 3/29: 5 mg; 4/5: 5 mg; Otherwise 7.5 mg on Wed, Sat; 5 mg all other days   Weekly total 40 mg   Plan last modified Amanda Wheeler RN (2/8/2017)   Next INR check 4/3/2017   Priority INR   Target end date     Indications   Long-term (current) use of anticoagulants [Z79.01] [Z79.01]  Aortic valve prosthesis present [Z95.2]  Atrial fibrillation (H) [I48.91]         Anticoagulation Episode Summary     INR check location     Preferred lab     Send INR reminders to [facility]    Comments       Anticoagulation Care Providers     Provider Role Specialty Phone number    Obdulio Ingram MD Responsible Internal Medicine [phone]            See the Encounter Report to view Anticoagulation Flowsheet and Dosing Calendar (Go to Encounters tab in chart review, and find the Anticoagulation Therapy Visit)    Dosage adjustment made based on physician directed care plan.    AMARIS Jimenez CNP

## 2017-04-03 ENCOUNTER — ANTICOAGULATION THERAPY VISIT (OUTPATIENT)
Dept: ANTICOAGULATION | Facility: CLINIC | Age: 62
End: 2017-04-03
Payer: COMMERCIAL

## 2017-04-03 DIAGNOSIS — Z79.01 LONG-TERM (CURRENT) USE OF ANTICOAGULANTS: ICD-10-CM

## 2017-04-03 DIAGNOSIS — Z95.2 AORTIC VALVE PROSTHESIS PRESENT: ICD-10-CM

## 2017-04-03 DIAGNOSIS — N32.81 OVERACTIVE BLADDER: ICD-10-CM

## 2017-04-03 LAB — INR POINT OF CARE: 5.9 (ref 0.86–1.14)

## 2017-04-03 PROCEDURE — 36416 COLLJ CAPILLARY BLOOD SPEC: CPT

## 2017-04-03 PROCEDURE — 85610 PROTHROMBIN TIME: CPT | Mod: QW

## 2017-04-03 PROCEDURE — 99207 ZZC NO CHARGE NURSE ONLY: CPT

## 2017-04-03 RX ORDER — MIRABEGRON 50 MG/1
50 TABLET, EXTENDED RELEASE ORAL DAILY
Qty: 90 TABLET | Refills: 1 | Status: SHIPPED | OUTPATIENT
Start: 2017-04-03 | End: 2017-11-02

## 2017-04-03 NOTE — MR AVS SNAPSHOT
Todd S Aschoff   4/3/2017 3:15 PM   Anticoagulation Therapy Visit    Description:  61 year old male   Provider[facility]  [facility]   Department:  [facility]           INR as of 4/3/2017     Today's INR 5.9!      Anticoagulation Summary as of 4/3/2017     INR goal 2.5-3.5   Today's INR 5.9!   Full instructions 4/3: Hold; 4/4: Hold; 4/5: 5 mg; Otherwise 7.5 mg on Wed, Sat; 5 mg all other days   Next INR check 4/10/2017    Indications   Long-term (current) use of anticoagulants [Z79.01] [Z79.01]  Aortic valve prosthesis present [Z95.2]  Atrial fibrillation (H) [I48.91]         Your next Anticoagulation Clinic appointment(s)     Apr 10, 2017  1:45 PM CDT   Anticoagulation Visit with [facility]   [facility] ([facility])    [address]  [facility] [id number] [phone]              Contact Numbers     [facility] Phone Numbers:  Anticoagulation Clinic Appointments : [phone]  Anticoagulation Nurse: [phone]         April 2017 Details    Sun Mon Tue Wed Thu Fri Sat           1                 2               3      Hold   See details      4      Hold         5      5 mg         6      5 mg         7      5 mg         8      7.5 mg           9      5 mg         10            11               12               13               14               15                 16               17               18               19               20               21               22                 23               24               25               26               27               28               29                 30                      Date Details   04/03 This INR check       Date of next INR:  4/10/2017         How to take your warfarin dose     To take:  5 mg Take 1 of the 5 mg tablets.    To take:  7.5 mg Take 1.5 of the 5 mg tablets.    Hold Do not take your warfarin dose. See the Details table to the right for additional  instructions.

## 2017-04-03 NOTE — PROGRESS NOTES
ANTICOAGULATION FOLLOW-UP CLINIC VISIT    Patient Name:  Todd S Aschoff  Date:  4/3/2017  Contact Type:  Face to Face    SUBJECTIVE:     Patient Findings     Positives Inflammation (area on back that had cyst removal, now has odor), Antibiotic use or infection (Pt will begin taking Cipro today BID x 30days)           OBJECTIVE    INR Protime   Date Value Ref Range Status   04/03/2017 5.9 (A) 0.86 - 1.14 Final     Comment:     INR right hand = 5.9, retested INR using left hand = 5.8       ASSESSMENT / PLAN  INR assessment SUPRA    Recheck INR In: 1 WEEK    INR Location Clinic      Anticoagulation Summary as of 4/3/2017     INR goal 2.5-3.5   Today's INR 5.9!   Maintenance plan 7.5 mg (5 mg x 1.5) on Wed, Sat; 5 mg (5 mg x 1) all other days   Full instructions 4/3: Hold; 4/4: Hold; 4/5: 5 mg; Otherwise 7.5 mg on Wed, Sat; 5 mg all other days   Weekly total 40 mg   Plan last modified Amanda Wheeler RN (2/8/2017)   Next INR check 4/10/2017   Priority INR   Target end date     Indications   Long-term (current) use of anticoagulants [Z79.01] [Z79.01]  Aortic valve prosthesis present [Z95.2]  Atrial fibrillation (H) [I48.91]         Anticoagulation Episode Summary     INR check location     Preferred lab     Send INR reminders to [facility]    Comments       Anticoagulation Care Providers     Provider Role Specialty Phone number    Obdulio Ingram MD Responsible Internal Medicine [phone]            See the Encounter Report to view Anticoagulation Flowsheet and Dosing Calendar (Go to Encounters tab in chart review, and find the Anticoagulation Therapy Visit)    Dosage adjustment made based on physician directed care plan.    Lucina Andrew RN

## 2017-04-06 DIAGNOSIS — Z79.01 CURRENT USE OF LONG TERM ANTICOAGULATION: ICD-10-CM

## 2017-04-06 DIAGNOSIS — M62.830 BACK MUSCLE SPASM: ICD-10-CM

## 2017-04-07 RX ORDER — WARFARIN SODIUM 5 MG/1
TABLET ORAL
Qty: 35 TABLET | Refills: 0 | Status: SHIPPED | OUTPATIENT
Start: 2017-04-07 | End: 2017-04-28

## 2017-04-07 RX ORDER — DIAZEPAM 5 MG
TABLET ORAL
Qty: 270 TABLET | Refills: 0 | Status: SHIPPED | OUTPATIENT
Start: 2017-04-07 | End: 2017-07-19

## 2017-04-07 NOTE — TELEPHONE ENCOUNTER
Coumadin    Last Written Prescription Date: 3/7/17  Last Fill Qty: 35, # refills: 0  Last Office Visit with G, P or [facility] prescribing provider: 4/3/17 INR, 11/14/16         Date and Result of Last PT/INR:   Lab Results   Component Value Date    INR 5.9 04/03/2017    INR 2.2 03/27/2017    INR 9.14 03/23/2017    PT 3.3@ 06/29/2010        Lab Results   Component Value Date    INR 5.9 (A) 04/03/2017    INR 2.2 03/27/2017    INR 9.14 (HH) 03/23/2017    INR >8 03/23/2017    INR 5.53 (HH) 03/13/2017     Pt is due for appt with Dr. Ingram, will call to get scheduled.  Coumadin RX sent to INR to fill, while I work on seeing Dr. Nataly QUIROZ RN

## 2017-04-07 NOTE — TELEPHONE ENCOUNTER
Pt called and left message, stating that Nicko is due for an appt with  and that we can't get his refills going until an appt is made.    Valium      Last Written Prescription Date:  12/15/16  Last Fill Quantity: 270,   # refills: 0  Last Office Visit with [facility], P or  Health prescribing provider: 11/14/16, 1/8/16 Annual with Nataly  Future Office visit:       Routing refill request to provider for review/approval because:  Drug not on the [facility], [facility] or  [employer] refill protocol or controlled substance    Pt is also due for a f/u appt with Dr. Ingram  Called and message left

## 2017-04-12 ENCOUNTER — ANTICOAGULATION THERAPY VISIT (OUTPATIENT)
Dept: ANTICOAGULATION | Facility: CLINIC | Age: 62
End: 2017-04-12
Payer: COMMERCIAL

## 2017-04-12 DIAGNOSIS — Z79.01 LONG-TERM (CURRENT) USE OF ANTICOAGULANTS: ICD-10-CM

## 2017-04-12 DIAGNOSIS — Z95.2 AORTIC VALVE PROSTHESIS PRESENT: ICD-10-CM

## 2017-04-12 DIAGNOSIS — I48.91 ATRIAL FIBRILLATION (H): ICD-10-CM

## 2017-04-12 LAB — INR POINT OF CARE: 3.5 (ref 0.86–1.14)

## 2017-04-12 PROCEDURE — 99207 ZZC NO CHARGE NURSE ONLY: CPT

## 2017-04-12 PROCEDURE — 36416 COLLJ CAPILLARY BLOOD SPEC: CPT

## 2017-04-12 PROCEDURE — 85610 PROTHROMBIN TIME: CPT | Mod: QW

## 2017-04-12 NOTE — PROGRESS NOTES
ANTICOAGULATION FOLLOW-UP CLINIC VISIT    Patient Name:  Todd S Aschoff  Date:  4/12/2017  Contact Type:  Face to Face    SUBJECTIVE:     Patient Findings     Positives Change in medications (Continues on Cipro)           OBJECTIVE    INR Protime   Date Value Ref Range Status   04/12/2017 3.5 (A) 0.86 - 1.14 Final       ASSESSMENT / PLAN  INR assessment THER    Recheck INR In: 1 WEEK    INR Location Clinic      Anticoagulation Summary as of 4/12/2017     INR goal 2.5-3.5   Today's INR 3.5   Maintenance plan 7.5 mg (5 mg x 1.5) on Wed, Sat; 5 mg (5 mg x 1) all other days   Full instructions 4/12: 5 mg; 4/15: 5 mg; 4/19: 5 mg; Otherwise 7.5 mg on Wed, Sat; 5 mg all other days   Weekly total 40 mg   Plan last modified Amanda Wheeler RN (2/8/2017)   Next INR check 4/20/2017   Priority INR   Target end date     Indications   Long-term (current) use of anticoagulants [Z79.01] [Z79.01]  Aortic valve prosthesis present [Z95.2]  Atrial fibrillation (H) [I48.91]         Anticoagulation Episode Summary     INR check location     Preferred lab     Send INR reminders to [facility]    Comments       Anticoagulation Care Providers     Provider Role Specialty Phone number    Obdulio Ingram MD [facility] Internal Medicine [phone]            See the Encounter Report to view Anticoagulation Flowsheet and Dosing Calendar (Go to Encounters tab in chart review, and find the Anticoagulation Therapy Visit)    Dosage adjustment made based on physician directed care plan.    Amanda Wheeler, RN

## 2017-04-12 NOTE — MR AVS SNAPSHOT
Todd S Aschoff   4/12/2017 2:15 PM   Anticoagulation Therapy Visit    Description:  61 year old male   Provider[facility]  [facility]   Department:  [facility]           INR as of 4/12/2017     Today's INR 3.5      Anticoagulation Summary as of 4/12/2017     INR goal 2.5-3.5   Today's INR 3.5   Full instructions 4/12: 5 mg; 4/15: 5 mg; 4/19: 5 mg; Otherwise 7.5 mg on Wed, Sat; 5 mg all other days   Next INR check 4/20/2017    Indications   Long-term (current) use of anticoagulants [Z79.01] [Z79.01]  Aortic valve prosthesis present [Z95.2]  Atrial fibrillation (H) [I48.91]         Your next Anticoagulation Clinic appointment(s)     Apr 20, 2017  9:45 AM CDT   Anticoagulation Visit with [facility]   [facility]    [address] [facility] [address]  [facility] [id number] [phone]              Contact Numbers     [facility] Phone Numbers:  Anticoagulation Clinic Appointments : [phone]  Anticoagulation Nurse: [phone]         April 2017 Details    Sun Mon Tue Wed Thu Fri Sat           1                 2               3               4               5               6               7               8                 9               10               11               12      5 mg   See details      13      5 mg         14      5 mg         15      5 mg           16      5 mg         17      5 mg         18      5 mg         19      5 mg         20            21               22                 23               24               25               26               27               28               29                 30                      Date Details   04/12 This INR check       Date of next INR:  4/20/2017         How to take your warfarin dose     To take:  5 mg Take 1 of the 5 mg tablets.

## 2017-04-15 DIAGNOSIS — E03.9 ACQUIRED HYPOTHYROIDISM: ICD-10-CM

## 2017-04-15 DIAGNOSIS — J31.0 CHRONIC RHINITIS: ICD-10-CM

## 2017-04-17 RX ORDER — CETIRIZINE HYDROCHLORIDE 10 MG/1
TABLET ORAL
Qty: 30 TABLET | Refills: 0 | Status: SHIPPED | OUTPATIENT
Start: 2017-04-17 | End: 2017-06-06

## 2017-04-17 RX ORDER — LEVOTHYROXINE SODIUM 150 UG/1
TABLET ORAL
Qty: 30 TABLET | Refills: 0 | Status: SHIPPED | OUTPATIENT
Start: 2017-04-17 | End: 2017-05-18

## 2017-04-17 NOTE — TELEPHONE ENCOUNTER
Last OV-1/8/16      TSH   Date Value Ref Range Status   01/08/2016 2.98 0.40 - 4.00 mU/L Final   ]      Pt sched for appt on 4/28

## 2017-04-20 ENCOUNTER — ANTICOAGULATION THERAPY VISIT (OUTPATIENT)
Dept: ANTICOAGULATION | Facility: CLINIC | Age: 62
End: 2017-04-20
Payer: COMMERCIAL

## 2017-04-20 DIAGNOSIS — Z79.01 LONG-TERM (CURRENT) USE OF ANTICOAGULANTS: ICD-10-CM

## 2017-04-20 DIAGNOSIS — Z95.2 AORTIC VALVE PROSTHESIS PRESENT: ICD-10-CM

## 2017-04-20 LAB — INR POINT OF CARE: 3.5 (ref 0.86–1.14)

## 2017-04-20 PROCEDURE — 36416 COLLJ CAPILLARY BLOOD SPEC: CPT

## 2017-04-20 PROCEDURE — 85610 PROTHROMBIN TIME: CPT | Mod: QW

## 2017-04-20 PROCEDURE — 99207 ZZC NO CHARGE NURSE ONLY: CPT

## 2017-04-20 NOTE — PROGRESS NOTES
ANTICOAGULATION FOLLOW-UP CLINIC VISIT    Patient Name:  Todd S Aschoff  Date:  4/20/2017  Contact Type:  Face to Face    SUBJECTIVE:     Patient Findings     Positives Antibiotic use or infection (Pt continues with Cipro d/t infection of cyst removal on back appox 2 months ago)           OBJECTIVE    INR Protime   Date Value Ref Range Status   04/20/2017 3.5 (A) 0.86 - 1.14 Final       ASSESSMENT / PLAN  INR assessment THER    Recheck INR In: 2 WEEKS    INR Location Clinic      Anticoagulation Summary as of 4/20/2017     INR goal 2.5-3.5   Today's INR 3.5   Maintenance plan 7.5 mg (5 mg x 1.5) on Wed, Sat; 5 mg (5 mg x 1) all other days   Full instructions 4/22: 5 mg; 4/26: 5 mg; 4/29: 5 mg; 5/3: 5 mg; Otherwise 7.5 mg on Wed, Sat; 5 mg all other days   Weekly total 40 mg   Plan last modified Amanda Wheeler RN (2/8/2017)   Next INR check 5/4/2017   Priority INR   Target end date     Indications   Long-term (current) use of anticoagulants [Z79.01] [Z79.01]  Aortic valve prosthesis present [Z95.2]  Atrial fibrillation (H) [I48.91]         Anticoagulation Episode Summary     INR check location     Preferred lab     Send INR reminders to [facility]    Comments       Anticoagulation Care Providers     Provider Role Specialty Phone number    Obdulio Ingram MD [facility] Internal Medicine [phone]            See the Encounter Report to view Anticoagulation Flowsheet and Dosing Calendar (Go to Encounters tab in chart review, and find the Anticoagulation Therapy Visit)    Dosage adjustment made based on physician directed care plan.    Lucina Andrew RN

## 2017-04-20 NOTE — MR AVS SNAPSHOT
Todd S Aschoff   4/20/2017 9:45 AM   Anticoagulation Therapy Visit    Description:  61 year old male   Provider[facility]  [facility]   Department:  [facility]           INR as of 4/20/2017     Today's INR 3.5      Anticoagulation Summary as of 4/20/2017     INR goal 2.5-3.5   Today's INR 3.5   Full instructions 4/22: 5 mg; 4/26: 5 mg; 4/29: 5 mg; 5/3: 5 mg; Otherwise 7.5 mg on Wed, Sat; 5 mg all other days   Next INR check 5/4/2017    Indications   Long-term (current) use of anticoagulants [Z79.01] [Z79.01]  Aortic valve prosthesis present [Z95.2]  Atrial fibrillation (H) [I48.91]         Your next Anticoagulation Clinic appointment(s)     Apr 20, 2017  9:45 AM CDT   [facility]    [address]  [facility] [address] [phone]            May 04, 2017  9:30 AM CDT   Anticoagulation Visit with [facility]   [facility]    [address]  [facility] [address] [phone]              Contact Numbers     [facility] Phone Numbers:  Anticoagulation Clinic Appointments : [phone]  Anticoagulation Nurse: [phone]         April 2017 Details    Sun Mon Tue Wed Thu Fri Sat           1                 2               3               4               5               6               7               8                 9               10               11               12               13               14               15                 16               17               18               19               20      5 mg   See details      21      5 mg         22      5 mg           23      5 mg         24      5 mg         25      5 mg         26      5 mg         27      5 mg         28      5 mg         29      5 mg           30      5 mg                Date Details   04/20 This INR check                How to take your warfarin dose     To take:  5 mg Take 1 of the 5 mg tablets.           May 2017 Details    Sun Mon Tue Wed Thu Fri Sat      1      5 mg         2      5 mg         3      5 mg         4            5               6                 7               8               9               10               11               12               13                 14               15               16               17               18               19               20                 21               22               23               24               25               26               27                 28               29               30               31                   Date Details   No additional details    Date of next INR:  5/4/2017         How to take your warfarin dose     To take:  5 mg Take 1 of the 5 mg tablets.

## 2017-04-26 ENCOUNTER — TRANSFERRED RECORDS (OUTPATIENT)
Dept: HEALTH INFORMATION MANAGEMENT | Facility: CLINIC | Age: 62
End: 2017-04-26

## 2017-04-27 ENCOUNTER — ANTICOAGULATION THERAPY VISIT (OUTPATIENT)
Dept: ANTICOAGULATION | Facility: CLINIC | Age: 62
End: 2017-04-27
Payer: COMMERCIAL

## 2017-04-27 DIAGNOSIS — Z95.2 AORTIC VALVE PROSTHESIS PRESENT: ICD-10-CM

## 2017-04-27 DIAGNOSIS — Z79.01 LONG-TERM (CURRENT) USE OF ANTICOAGULANTS: ICD-10-CM

## 2017-04-27 LAB — INR POINT OF CARE: 2.9 (ref 0.86–1.14)

## 2017-04-27 PROCEDURE — 85610 PROTHROMBIN TIME: CPT | Mod: QW

## 2017-04-27 PROCEDURE — 99207 ZZC NO CHARGE NURSE ONLY: CPT

## 2017-04-27 PROCEDURE — 36416 COLLJ CAPILLARY BLOOD SPEC: CPT

## 2017-04-27 NOTE — MR AVS SNAPSHOT
Todd S Aschoff   4/27/2017 11:15 AM   Anticoagulation Therapy Visit    Description:  61 year old male   Provider:  [facility]   Department:  [facility]           INR as of 4/27/2017     Today's INR 2.9      Anticoagulation Summary as of 4/27/2017     INR goal 2.5-3.5   Today's INR 2.9   Full instructions 4/29: 5 mg; 5/3: 5 mg; 5/10: 5 mg; Otherwise 7.5 mg on Wed, Sat; 5 mg all other days   Next INR check 5/11/2017    Indications   Long-term (current) use of anticoagulants [Z79.01] [Z79.01]  Aortic valve prosthesis present [Z95.2]  Atrial fibrillation (H) [I48.91]         Your next Anticoagulation Clinic appointment(s)     Apr 27, 2017 11:15 AM CDT   [facility] with [facility]   [facility]    [address] [facility] [address]  [facility] [id number] [phone]            May 11, 2017  4:30 PM CDT   [facility] Visit with [facility]   [facility]    [address] [facility] [address]  [facility] [id number] [phone]              Contact Numbers     [facility] Phone Numbers:  Anticoagulation Clinic Appointments : [phone]  Anticoagulation Nurse: [phone]         April 2017 Details    Sun Mon Tue Wed Thu Fri Sat           1                 2               3               4               5               6               7               8                 9               10               11               12               13               14               15                 16               17               18               19               20               21               22                 23               24               25               26               27      5 mg   See details      28      5 mg         29      5 mg           30      5 mg                Date Details   04/27 This INR check               How to take your warfarin dose     To  take:  5 mg Take 1 of the 5 mg tablets.           May 2017 Details    Sun Mon Tue Wed Thu Fri Sat      1      5 mg         2      5 mg         3      5 mg         4      5 mg         5      5 mg         6      7.5 mg           7      5 mg         8      5 mg         9      5 mg         10      5 mg         11            12               13                 14               15               16               17               18               19               20                 21               22               23               24               25               26               27                 28               29               30               31                   Date Details   No additional details    Date of next INR:  5/11/2017         How to take your warfarin dose     To take:  5 mg Take 1 of the 5 mg tablets.    To take:  7.5 mg Take 1.5 of the 5 mg tablets.

## 2017-04-27 NOTE — PROGRESS NOTES
ANTICOAGULATION FOLLOW-UP CLINIC VISIT    Patient Name:  Todd S Aschoff  Date:  4/27/2017  Contact Type:  Face to Face    SUBJECTIVE:     Patient Findings     Positives Antibiotic use or infection (continues with abx for back wound)           OBJECTIVE    INR Protime   Date Value Ref Range Status   04/27/2017 2.9 (A) 0.86 - 1.14 Final       ASSESSMENT / PLAN  INR assessment THER    Recheck INR In: 2 WEEKS    INR Location Clinic      Anticoagulation Summary as of 4/27/2017     INR goal 2.5-3.5   Today's INR 2.9   Maintenance plan 7.5 mg (5 mg x 1.5) on Wed, Sat; 5 mg (5 mg x 1) all other days   Full instructions 4/29: 5 mg; 5/3: 5 mg; 5/10: 5 mg; Otherwise 7.5 mg on Wed, Sat; 5 mg all other days   Weekly total 40 mg   Plan last modified Amanda Wheeler RN (2/8/2017)   Next INR check 5/11/2017   Priority INR   Target end date     Indications   Long-term (current) use of anticoagulants [Z79.01] [Z79.01]  Aortic valve prosthesis present [Z95.2]  Atrial fibrillation (H) [I48.91]         Anticoagulation Episode Summary     INR check location     Preferred lab     Send INR reminders to [facility]    Comments       Anticoagulation Care Providers     Provider Role Specialty Phone number    Obdulio Ingram MD [facility] Internal [facility] [phone]            See the Encounter Report to view Anticoagulation Flowsheet and Dosing Calendar (Go to Encounters tab in chart review, and find the Anticoagulation Therapy Visit)    Dosage adjustment made based on physician directed care plan.    Lucina Andrew RN

## 2017-04-28 ENCOUNTER — OFFICE VISIT (OUTPATIENT)
Dept: INTERNAL MEDICINE | Facility: CLINIC | Age: 62
End: 2017-04-28
Payer: COMMERCIAL

## 2017-04-28 VITALS
HEIGHT: 73 IN | OXYGEN SATURATION: 96 % | BODY MASS INDEX: 39.63 KG/M2 | SYSTOLIC BLOOD PRESSURE: 110 MMHG | HEART RATE: 95 BPM | WEIGHT: 299 LBS | TEMPERATURE: 98 F | DIASTOLIC BLOOD PRESSURE: 70 MMHG

## 2017-04-28 DIAGNOSIS — E66.01 MORBID OBESITY, UNSPECIFIED OBESITY TYPE (H): ICD-10-CM

## 2017-04-28 DIAGNOSIS — J31.0 CHRONIC RHINITIS: ICD-10-CM

## 2017-04-28 DIAGNOSIS — Z95.2 AORTIC VALVE PROSTHESIS PRESENT: ICD-10-CM

## 2017-04-28 DIAGNOSIS — E78.5 HYPERLIPIDEMIA LDL GOAL <130: ICD-10-CM

## 2017-04-28 DIAGNOSIS — G89.29 CHRONIC LOW BACK PAIN WITH SCIATICA, SCIATICA LATERALITY UNSPECIFIED, UNSPECIFIED BACK PAIN LATERALITY: ICD-10-CM

## 2017-04-28 DIAGNOSIS — Z79.01 CURRENT USE OF LONG TERM ANTICOAGULATION: ICD-10-CM

## 2017-04-28 DIAGNOSIS — I48.20 CHRONIC ATRIAL FIBRILLATION (H): ICD-10-CM

## 2017-04-28 DIAGNOSIS — Z00.00 ROUTINE GENERAL MEDICAL EXAMINATION AT A HEALTH CARE FACILITY: Primary | ICD-10-CM

## 2017-04-28 DIAGNOSIS — F33.42 MAJOR DEPRESSIVE DISORDER, RECURRENT EPISODE, IN FULL REMISSION (H): ICD-10-CM

## 2017-04-28 DIAGNOSIS — N32.81 OVERACTIVE BLADDER: ICD-10-CM

## 2017-04-28 DIAGNOSIS — E03.9 ACQUIRED HYPOTHYROIDISM: ICD-10-CM

## 2017-04-28 DIAGNOSIS — Z96.643 H/O TOTAL HIP ARTHROPLASTY, BILATERAL: ICD-10-CM

## 2017-04-28 DIAGNOSIS — M54.40 CHRONIC LOW BACK PAIN WITH SCIATICA, SCIATICA LATERALITY UNSPECIFIED, UNSPECIFIED BACK PAIN LATERALITY: ICD-10-CM

## 2017-04-28 DIAGNOSIS — M62.838 MUSCLE SPASM: ICD-10-CM

## 2017-04-28 PROCEDURE — 84443 ASSAY THYROID STIM HORMONE: CPT | Performed by: INTERNAL MEDICINE

## 2017-04-28 PROCEDURE — 36415 COLL VENOUS BLD VENIPUNCTURE: CPT | Performed by: INTERNAL MEDICINE

## 2017-04-28 PROCEDURE — 99396 PREV VISIT EST AGE 40-64: CPT | Performed by: INTERNAL MEDICINE

## 2017-04-28 PROCEDURE — 84439 ASSAY OF FREE THYROXINE: CPT | Performed by: INTERNAL MEDICINE

## 2017-04-28 RX ORDER — AZELASTINE 1 MG/ML
2 SPRAY, METERED NASAL 2 TIMES DAILY PRN
Qty: 3 BOTTLE | Refills: 3 | Status: SHIPPED | OUTPATIENT
Start: 2017-04-28 | End: 2017-12-26

## 2017-04-28 RX ORDER — WARFARIN SODIUM 5 MG/1
TABLET ORAL
Qty: 100 TABLET | Refills: 1 | Status: SHIPPED | OUTPATIENT
Start: 2017-04-28 | End: 2017-12-13

## 2017-04-28 RX ORDER — SIMVASTATIN 40 MG
40 TABLET ORAL AT BEDTIME
Qty: 90 TABLET | Refills: 3 | Status: SHIPPED | OUTPATIENT
Start: 2017-04-28 | End: 2018-04-13

## 2017-04-28 RX ORDER — PREGABALIN 100 MG/1
100 CAPSULE ORAL 3 TIMES DAILY
Qty: 270 CAPSULE | Refills: 3 | Status: SHIPPED | OUTPATIENT
Start: 2017-04-28 | End: 2018-02-04

## 2017-04-28 RX ORDER — CYCLOBENZAPRINE HCL 10 MG
10 TABLET ORAL 3 TIMES DAILY PRN
Qty: 270 TABLET | Refills: 3 | Status: SHIPPED | OUTPATIENT
Start: 2017-04-28 | End: 2018-04-30

## 2017-04-28 RX ORDER — PROPAFENONE HYDROCHLORIDE 150 MG/1
150 TABLET, COATED ORAL EVERY 8 HOURS
Qty: 270 TABLET | Refills: 3 | Status: SHIPPED | OUTPATIENT
Start: 2017-04-28 | End: 2018-04-04

## 2017-04-28 ASSESSMENT — ANXIETY QUESTIONNAIRES
6. BECOMING EASILY ANNOYED OR IRRITABLE: NOT AT ALL
7. FEELING AFRAID AS IF SOMETHING AWFUL MIGHT HAPPEN: NOT AT ALL
2. NOT BEING ABLE TO STOP OR CONTROL WORRYING: NOT AT ALL
3. WORRYING TOO MUCH ABOUT DIFFERENT THINGS: NOT AT ALL
GAD7 TOTAL SCORE: 0
IF YOU CHECKED OFF ANY PROBLEMS ON THIS QUESTIONNAIRE, HOW DIFFICULT HAVE THESE PROBLEMS MADE IT FOR YOU TO DO YOUR WORK, TAKE CARE OF THINGS AT HOME, OR GET ALONG WITH OTHER PEOPLE: NOT DIFFICULT AT ALL
5. BEING SO RESTLESS THAT IT IS HARD TO SIT STILL: NOT AT ALL
1. FEELING NERVOUS, ANXIOUS, OR ON EDGE: NOT AT ALL

## 2017-04-28 ASSESSMENT — PATIENT HEALTH QUESTIONNAIRE - PHQ9: 5. POOR APPETITE OR OVEREATING: NOT AT ALL

## 2017-04-28 NOTE — NURSING NOTE
"Chief Complaint   Patient presents with     Physical     fasting        Initial /70  Pulse 95  Ht 6' 1\" (1.854 m)  Wt 299 lb (135.6 kg)  SpO2 96%  BMI 39.45 kg/m2 Estimated body mass index is 39.45 kg/(m^2) as calculated from the following:    Height as of this encounter: 6' 1\" (1.854 m).    Weight as of this encounter: 299 lb (135.6 kg).  Medication Reconciliation: complete    "

## 2017-04-28 NOTE — PATIENT INSTRUCTIONS

## 2017-04-28 NOTE — LETTER
[facility]    04/28/17    Patient: Todd S Aschoff  YOB: 1955  Medical Record Number: [id number]                                                                  Controlled Substance Agreement  I understand that my care provider has prescribed controlled substances (narcotics, tranquilizers, and/or stimulants) to help manage my condition(s).  I am taking this medicine to help me function or work.  I know that this is strong medicine.  It could have serious side effects and even cause a dependency on the drug.  If I stop these medicines suddenly, I could have severe withdrawal symptoms.    The risks, benefits, and side effects of these medication(s) were explained to me.  I agree that:  1. I will take part in other treatments as advised by my provider.  This may be psychiatry or counseling, physical therapy, behavioral therapy, group treatment, or a referral to a pain clinic.  I will reduce or stop my medicine when my provider tells me to do so.   2. I will take my medicines as prescribed.  I will not change the dose or schedule unless my provider tells me to.  There will be no refills if I  run out early.   I may be contacted at any time without warning and asked to complete a drug test or pill count.   3. I will keep all my appointments at the clinic.  If I miss appointments or fail to follow instructions, my provider may stop my medicine.  4. I will not ask other providers to prescribe controlled substances. And I will not accept controlled substances from other people. If I need another prescribed controlled substance for a new reason, I will notify my provider within one business day.  5. If I enroll in the [place] Medical Marijuana program, I will tell my provider.  I will also sign an agreement to share my medical records with my provider.  6. I will use one pharmacy to fill all of my controlled substance prescriptions.  If my prescription is mailed to my pharmacy, it may take  5 to 7 days for my medicine to be ready.  7. I understand that my provider, clinic care team, and pharmacy can track controlled substance prescriptions from other providers through a central database (prescription monitoring program).  8. I will bring in my list of medications (or my medicine bottles) each time I come to the clinic.  REV- 04/2016                                                                                                                                            Page 1 of 2      [facility]    04/28/17    Patient: Todd S Aschoff  YOB: 1955  Medical Record Number: [id number]    9. Refills of controlled substances will be made only during office hours.  It is up to me to make sure that I do not run out of my medicines on weekends or holidays.    10. I am responsible for my prescriptions.  If the medicine is lost or stolen, it will not be replaced.   I also agree not to share these medicines with anyone.  11. I agree to not use ANY illegal or recreational drugs.  This includes marijuana, cocaine, bath salts or other drugs.  I agree not to use alcohol unless my provider says I may.  I agree to give urine samples whenever asked.  If I fail to give a urine sample, the provider may stop my medicine.     12. I will tell my nurse or provider right away if I become pregnant or have a new medical problem treated outside of [facility].  13. I understand that this medicine can affect my thinking and judgment.  It may be unsafe for me to drive, use machinery and do dangerous tasks.  I will not do any of these things until I know how the medicine affects me.  If my dose changes, I will wait to see how it affects me.  I will contact my provider if I have concerns about medicine side effects.  I understand that if I do not follow any of the conditions above, my prescriptions or treatment may be stopped.    I agree that my provider, clinic care team, and pharmacy may work with  any city, state or federal law enforcement agency that investigates the misuse, sale, or other diversion of my controlled medicine. I will allow my provider to discuss my care with or share a copy of this agreement with any other treating provider, pharmacy or emergency room where I receive care.  I agree to give up (waive) any right of privacy or confidentiality with respect to these authorizations.   I have read this agreement and have asked questions about anything I did not understand.   ___________________________________    ___________________________  Patient Signature                                                           Date and Time  ___________________________________     ____________________________  Witness                                                                            Date and Time  ___________________________________  Obdulio Ingram MD, MD  REV-  04/2016                                                                                                                                                                 Page 2 of 2

## 2017-04-28 NOTE — MR AVS SNAPSHOT
After Visit Summary   4/28/2017    Todd S Aschoff    MRN: [id number]           Patient Information     Date Of Birth          1955        Visit Information        Provider Department      4/28/2017 11:20 AM Obdulio Ingram MD [facility]        Today's Diagnoses     Routine general medical examination at a health care facility    -  1    Chronic atrial fibrillation (H)        Major depressive disorder, recurrent episode, in full remission (H)        Acquired hypothyroidism        Current use of long term anticoagulation        Chronic low back pain with sciatica, sciatica laterality unspecified, unspecified back pain laterality        Muscle spasm        Hyperlipidemia LDL goal <130        Chronic rhinitis          Care Instructions      Preventive Health Recommendations  Male Ages 50 - 64    Yearly exam:             See your health care provider every year in order to  o   Review health changes.   o   Discuss preventive care.    o   Review your medicines if your doctor has prescribed any.     Have a cholesterol test every 5 years, or more frequently if you are at risk for high cholesterol/heart disease.     Have a diabetes test (fasting glucose) every three years. If you are at risk for diabetes, you should have this test more often.     Have a colonoscopy at age 50, or have a yearly FIT test (stool test). These exams will check for colon cancer.      Talk with your health care provider about whether or not a prostate cancer screening test (PSA) is right for you.    You should be tested each year for STDs (sexually transmitted diseases), if you re at risk.     Shots: Get a flu shot each year. Get a tetanus shot every 10 years.     Nutrition:    Eat at least 5 servings of fruits and vegetables daily.     Eat whole-grain bread, whole-wheat pasta and brown rice instead of white grains and rice.     Talk to your provider about Calcium and Vitamin D.     Lifestyle    Exercise for at  least 150 minutes a week (30 minutes a day, 5 days a week). This will help you control your weight and prevent disease.     Limit alcohol to one drink per day.     No smoking.     Wear sunscreen to prevent skin cancer.     See your dentist every six months for an exam and cleaning.     See your eye doctor every 1 to 2 years.      Everything looks fine!    Refills of medications have been faxed to your pharmacy.     I'll get back to you with lab results soon, especially if there is anything of concern.      See you in a year, sooner if problems.            Follow-ups after your visit        Your next 10 appointments already scheduled     May 11, 2017  4:30 PM CDT   [facility] with [facility]   [facility]    [address] [facility] [address]  [facility] [id number] [phone]            Jun 08, 2017  9:00 AM CDT   Return Visit with Geraldo Gonzales MD   [facility] ([facility])    [address]  [address]  [facility] [address] [phone]              Who to contact     If you have questions or need follow up information about today's clinic visit or your schedule please contact [facility] directly at [phone].  Normal or non-critical lab and imaging results will be communicated to you by [employer], letter or phone within 4 business days after the clinic has received the results. If you do not hear from us within 7 days, please contact the clinic through [employer] or phone. If you have a critical or abnormal lab result, we will notify you by phone as soon as possible.  Submit refill requests through [employer] or call your pharmacy and they will forward the refill request to us. Please allow 3 business days for your refill to be completed.          Additional Information About Your Visit        [employer] Information     [employer] gives you secure access to  "your electronic health record. If you see a primary care provider, you can also send messages to your care team and make appointments. If you have questions, please call your primary care clinic.  If you do not have a primary care provider, please call [phone] and they will assist you.        Care EveryWhere ID     This is your Care EveryWhere ID. This could be used by other organizations to access your [place] medical records  [id number]        Your Vitals Were     Pulse Temperature Height Pulse Oximetry BMI (Body Mass Index)       95 98  F (36.7  C) (Oral) 6' 1\" (1.854 m) 96% 39.45 kg/m2        Blood Pressure from Last 3 Encounters:   04/28/17 110/70   03/13/17 115/76   03/06/17 101/56    Weight from Last 3 Encounters:   04/28/17 299 lb (135.6 kg)   03/06/17 (!) 307 lb (139.3 kg)   02/08/17 (!) 307 lb (139.3 kg)              We Performed the Following     TSH with free T4 reflex          Today's Medication Changes          These changes are accurate as of: 4/28/17 12:43 PM.  If you have any questions, ask your nurse or doctor.               These medicines have changed or have updated prescriptions.        Dose/Directions    warfarin 5 MG tablet   Commonly known as:  COUMADIN   This may have changed:  See the new instructions.   Used for:  Current use of long term anticoagulation   Changed by:  Obdulio Ingram MD        take 1 tablet ( 5mg) daily except 1 and 1/2 tablet (7.5mg) on Wednesdays and Saturdays  or as intructed by INR clinic   Quantity:  100 tablet   Refills:  1         Stop taking these medicines if you haven't already. Please contact your care team if you have questions.     HYDROcodone-acetaminophen 5-325 MG per tablet   Commonly known as:  NORCO   Stopped by:  Obdulio Ingram MD           oxyCODONE 5 MG IR tablet   Commonly known as:  ROXICODONE   Stopped by:  Obdulio Ingram MD           oxyCODONE-acetaminophen 5-325 MG per tablet   Commonly known as:  PERCOCET   Stopped by:  Nataly" Obdulio DASH MD                Where to get your medicines      These medications were sent to [id number] - [place], [place] - [address]  [address] [facility], [place] [address]     Phone:  [phone]     azelastine 0.1 % spray    cyclobenzaprine 10 MG tablet    propafenone 150 MG Tabs tablet    simvastatin 40 MG tablet    warfarin 5 MG tablet         Some of these will need a paper prescription and others can be bought over the counter.  Ask your nurse if you have questions.     Bring a paper prescription for each of these medications     pregabalin 100 MG capsule                Primary Care Provider Office Phone # Fax #    Obdulio Ingram [phone]       [facility] [address]  [facility] [address]        Thank you!     Thank you for choosing [facility]  for your care. Our goal is always to provide you with excellent care. Hearing back from our patients is one way we can continue to improve our services. Please take a few minutes to complete the written survey that you may receive in the mail after your visit with us. Thank you!             Your Updated Medication List - Protect others around you: Learn how to safely use, store and throw away your medicines at www.disposemymeds.org.          This list is accurate as of: 4/28/17 12:43 PM.  Always use your most recent med list.                   Brand Name Dispense Instructions for use    acetaminophen 650 MG 8 hour tablet    ACETAMINOPHEN 8 HOUR    250 tablet    Take 650 mg by mouth 2 times daily       azelastine 0.1 % spray    ASTELIN    3 Bottle    Spray 2 sprays in nostril 2 times daily as needed       cetirizine 10 MG tablet    zyrTEC    30 tablet    TAKE 1 TABLET (10 MG) BY MOUTH DAILY AS NEEDED       cyclobenzaprine 10 MG tablet    FLEXERIL    270 tablet    Take 1 tablet (10 mg) by mouth 3 times daily as needed for muscle spasms       diazepam 5 MG tablet    VALIUM    270 tablet    TAKE 1  TABLET BY MOUTH EVERY 8 HOURS AS NEEDED FOR ANXIETY OR MUSCLE SPASMS MAXIMUM #270 EVERY 90 DAYS.       donepezil 10 MG tablet    ARICEPT    90 tablet    Take 1 tablet (10 mg) by mouth At Bedtime       enoxaparin 120 MG/0.8ML injection    enoxaparin    20 Syringe    Inject 0.9 mLs (135 mg) Subcutaneous every 12 hours       guanFACINE 1 MG tablet    TENEX    90 tablet    Take 1 tablet (1 mg) by mouth At Bedtime       isometheptene-acetaminophen-dichloralphenazone -100 MG per capsule    MIDRIN    30 capsule    Take 1 capsule by mouth 4 times daily as needed       levothyroxine 150 MCG tablet    SYNTHROID/LEVOTHROID    30 tablet    TAKE 1 TABLET BY MOUTH ONCE DAILY       mirabegron 50 MG 24 hr tablet    MYRBETRIQ    90 tablet    Take 1 tablet (50 mg) by mouth daily       pregabalin 100 MG capsule    LYRICA    270 capsule    Take 1 capsule (100 mg) by mouth 3 times daily Do not give if somnolent/sedated.       propafenone 150 MG Tabs tablet    RYTHMOL    270 tablet    Take 1 tablet (150 mg) by mouth every 8 hours       senna-docusate 8.6-50 MG per tablet    SENOKOT-S;PERICOLACE    60 tablet    Take 1 tablet by mouth 2 times daily as needed for constipation       simvastatin 40 MG tablet    ZOCOR    90 tablet    Take 1 tablet (40 mg) by mouth At Bedtime       traZODone 100 MG tablet    DESYREL    90 tablet    Take 1 tablet (100 mg) by mouth nightly as needed for sleep       venlafaxine 150 MG Tb24 24 hr tablet    EFFEXOR-ER    90 each    Take 1 tablet (150 mg) by mouth daily (with breakfast)       warfarin 5 MG tablet    COUMADIN    100 tablet    take 1 tablet ( 5mg) daily except 1 and 1/2 tablet (7.5mg) on Wednesdays and Saturdays  or as intructed by INR clinic

## 2017-04-28 NOTE — PROGRESS NOTES
SUBJECTIVE:     CC: Todd S Aschoff is an 61 year old male who presents for preventative health visit.     Specialists: He follows Dr. Gonzales for BPH and elevated PSA. He does not require a digital rectal exam today.     Colonoscopy: Most recent was in 2015. He was advised to re-screen in 2020 due to family hx of colon cancer.     Exercise: He tries to walk about 15 minutes every day.     Ears: Patient had recent visit with ENT and was told he has significant hearing loss in bilateral ears. He was advised to get hearing aids. He also c/o tinnitus.     : Patient takes Myrbetriq for urgency issues.     Healthy Habits:    Do you get at least three servings of calcium containing foods daily (dairy, green leafy vegetables, etc.)? yes    Amount of exercise or daily activities, outside of work:  Walks a lot     Problems taking medications regularly No    Medication side effects: No    Have you had an eye exam in the past two years? No- will be making appiontment     Do you see a dentist twice per year? yes    Do you have sleep apnea, excessive snoring or daytime drowsiness?yes- snoring     [facility] care doctors Dr. Enrico Mcnair's PHQ-2 Score:   PHQ-2 ( 1999 [employer]) 4/28/2017 12/24/2014   Q1: Little interest or pleasure in doing things 0 0   Q2: Feeling down, depressed or hopeless 0 0   PHQ-2 Score 0 0       Abuse: Current or Past(Physical, Sexual or Emotional)- No  Do you feel safe in your environment - Yes    Social History   Substance Use Topics     Smoking status: Never Smoker     Smokeless tobacco: Never Used     Alcohol use No      Comment: Stopped drinking alcohol ~2009     The patient does not drink >3 drinks per day nor >7 drinks per week.    Last PSA:   PSA   Date Value Ref Range Status   02/22/2017 5.84 (H) 0 - 4 ug/L Final     Comment:     Assay Method:  Chemiluminescence using Siemens Vista analyzer       Recent Labs   Lab Test  02/22/17   0911  01/08/16   1116  12/24/14   0846  12/17/13   1012   CHOL  175   172  166  170   HDL  46  48  41  30*   LDL  99  94  78  74   TRIG  152*  148  234*  331*   CHOLHDLRATIO   --    --   4.0  5.7*   NHDL  129  124   --    --        Reviewed orders with patient. Reviewed health maintenance and updated orders accordingly - Yes    Reviewed and updated as needed this visit by clinical staff  Tobacco  Allergies  Meds  Med Hx  Surg Hx  Fam Hx  Soc Hx        Reviewed and updated as needed this visit by Provider        Past Medical History:   Diagnosis Date     Alcohol dependence (H)      Allergy, unspecified not elsewhere classified     seasonal     Antiplatelet or antithrombotic long-term use     coumadin/lovenox     Aortic valve prosthesis present      Atrial fibrillation (H)      Blood transfusion     after heart surg 1992     BPH (benign prostatic hypertrophy)      Chronic infection     low back wound incision not healing      Chronic pain     lower back and right leg and left leg     Coagulation disorder (H)     on blood thinners     Dissection of aorta, thoracic (H) 1992    St Jose F aotic valve + arch graft 1992     Headache(784.0)      Heart murmur     aortic valve replaced and arch     Low back pain      Major depression      Mixed hyperlipidemia      Numbness and tingling     right leg post surg rightt hip/ also left leg since surg     OA (osteoarthritis)     hips     Obesity, unspecified      Sciatica 2002    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 Jose F's valve     C NONSPECIFIC PROCEDURE  1992    repair of TAA with graft     C NONSPECIFIC PROCEDURE  2002    R hip replacement comp's by nerve injury with pain down into R leg, nadya below knee     C TOTAL HIP ARTHROPLASTY  2010    Left AMANDA     CATARACT IOL, RT/LT      rt eye only     CHOLECYSTECTOMY, LAPOROSCOPIC  8/10     COLONOSCOPY  1/15/2015    Dr. Kothari [facility]     COLONOSCOPY N/A 1/15/2015     Procedure: COLONOSCOPY;  Surgeon: Johnson Kothari MD;  Location: [facility]     DECOMPRESSION LUMBAR ONE LEVEL  4/3/2013    Procedure: DECOMPRESSION LUMBAR ONE LEVEL;  Open Decompression L3-4 bilateral;  Surgeon: Travis Coffey MD;  Location: [facility] OR     DECOMPRESSION, FUSION CERVICAL ANTERIOR ONE LEVEL, COMBINED  3/23/2012    Procedure:COMBINED DECOMPRESSION, FUSION CERVICAL ANTERIOR ONE LEVEL; Anterior Cervical Decompression and Fusion C4-6; Surgeon:TRAVIS COFFEY; Location:[facility] OR     EXPLORE SPINE, REMOVE HARDWARE, COMBINED  5/23/2013    Procedure: COMBINED EXPLORE SPINE, REMOVE HARDWARE;  Exploration Lumbar Wound for fluid collection;  Surgeon: Travis Coffey MD;  Location: [facility]     FUSION CERVICAL ANTERIOR TWO LEVELS  3/26/2012    Procedure:FUSION CERVICAL ANTERIOR TWO LEVELS; Anterior Cervical Fusion C4-6, Anterior Cervical  Hematoma Evacuation; Surgeon:TRAVIS COFFEY; Location:[facility]     IRRIGATION AND DEBRIDEMENT SPINE, CLOSE WOUND, COMBINED  3/26/2012    Procedure:COMBINED IRRIGATION AND DEBRIDEMENT SPINE, CLOSE WOUND; Surgeon:TRAVIS COFFEY; Location:[facility] OR     removal of cyst of back   2.5week     TONSILLECTOMY       wisdom teeth[         ROS:  C: NEGATIVE for fever, chills, difficulty sleeping, fatigue   I: NEGATIVE for worrisome rashes, moles or lesions  E: NEGATIVE for vision changes or irritation  ENT: POSITIVE for allergies(astelin and zyrtec daily during spring season) and bilateral hearing loss and tinnitus   R: NEGATIVE for significant cough or SOB  CV: NEGATIVE for chest pain, palpitations or peripheral edema  GI: NEGATIVE for nausea, abdominal pain, heartburn, or change in bowel habits   male: negative for dysuria, hematuria, decreased urinary stream, erectile dysfunction  M: NEGATIVE for significant arthralgias or myalgia  N: NEGATIVE for weakness, dizziness or paresthesias, abrupt changes in speech, severe and/or frequent headaches   E: NEGATIVE for temperature  "intolerance, skin/hair changes, unusual thirstiness   H: NEGATIVE for bleeding or bruising problems  P: NEGATIVE for changes in mood or affect    Problem list, Medication list, Allergies, and Medical/Social/Surgical histories reviewed in EPIC and updated as appropriate.  BP Readings from Last 3 Encounters:   04/28/17 110/70   03/13/17 115/76   03/06/17 101/56    Wt Readings from Last 3 Encounters:   04/28/17 135.6 kg (299 lb)   03/06/17 (!) 139.3 kg (307 lb)   02/08/17 (!) 139.3 kg (307 lb)          Past/recent records reviewed and discussed for --   Medications  Medical hx- recent ER visits   OBJECTIVE:     /70  Pulse 95  Ht 6' 1\" (1.854 m)  Wt 299 lb (135.6 kg)  SpO2 96%  BMI 39.45 kg/m2  EXAM:  GENERAL: healthy, alert and no distress  EYES: Eyes grossly normal to inspection, PERRL and conjunctivae and sclerae normal  HENT: ear canals and TM's normal, nose and mouth without ulcers or lesions  NECK: no adenopathy, no asymmetry, masses, or scars and thyroid normal to palpation  RESP: lungs clear to auscultation - no rales, rhonchi or wheezes  CV: regular rate and rhythm, normal S1 S2, no S3 or S4, no murmur, click or rub, no peripheral edema and peripheral pulses strong  ABDOMEN: soft, nontender, no hepatosplenomegaly, no masses and bowel sounds normal   (male): deferred to urology  RECTAL: deferred to urology   MS: no gross musculoskeletal defects noted, no edema  NEURO: Normal strength and tone, mentation intact and speech normal  PSYCH: mentation appears normal, affect normal/bright  LYMPH: no cervical or inguinal adenopathy    Past/recent records reviewed and discussed for --   Medical hx- Recent hospitalizations  Medications    ASSESSMENT/PLAN:     (Z00.00) Routine general medical examination at a health care facility  (primary encounter diagnosis)  Comment: Stable health. See epic orders. Reviewed recent lab results.     (I48.2) Chronic atrial fibrillation (H)  Comment: Continue current meds. " Advised cardiology f/u   Plan: propafenone (RYTHMOL) 150 MG TABS tablet,         CARDIOLOGY EVAL ADULT REFERRAL, warfarin (COUMADIN) 5 MG tablet        (F33.42) Major depressive disorder, recurrent episode, in full remission (H)  Comment: Stable. Continue current meds  Plan: venlafaxine (EFFEXOR-ER) 150 MG TB24    (E03.9) Acquired hypothyroidism  Euthyroid clinically. Update TSH.   Plan: TSH with free T4 reflex          (E66.01) Obesity, BMI >35 with comorbidities  Comment: Morbid obesity. Work on food portions. Continue current walking regimen    (Z79.01) Current use of long term anticoagulation  Comment: Continue current meds  Plan: warfarin (COUMADIN) 5 MG tablet          (M54.40,  G89.29) Chronic low back pain with sciatica, sciatica laterality unspecified, unspecified back pain laterality  Patient takes diazepam regularly. CSA reviewed and signed by patient.   Plan: pregabalin (LYRICA) 100 MG capsule          (M62.838) Muscle spasm  Plan: cyclobenzaprine (FLEXERIL) 10 MG tablet          (E78.5) Hyperlipidemia LDL goal <130  Comment: Update lipids. Continue current meds  Plan: simvastatin (ZOCOR) 40 MG tablet          (J31.0) Chronic rhinitis  Plan: azelastine (ASTELIN) 0.1 % spray          (N32.81) Overactive bladder  Comment: Stable. Continue current meds.   Plan: mirabegron (MYRBETRIQ) 50 MG 24 hr tablet     (Z95.2) Aortic valve prosthesis present  Advised updating cardiology f/u.   Plan: CARDIOLOGY EVAL ADULT REFERRAL          (Z96.643) H/O total hip arthroplasty, bilateral    Patient Instructions     Preventive Health Recommendations  Male Ages 50 - 64    Yearly exam:             See your health care provider every year in order to  o   Review health changes.   o   Discuss preventive care.    o   Review your medicines if your doctor has prescribed any.     Have a cholesterol test every 5 years, or more frequently if you are at risk for high cholesterol/heart disease.     Have a diabetes test (fasting  "glucose) every three years. If you are at risk for diabetes, you should have this test more often.     Have a colonoscopy at age 50, or have a yearly FIT test (stool test). These exams will check for colon cancer.      Talk with your health care provider about whether or not a prostate cancer screening test (PSA) is right for you.    You should be tested each year for STDs (sexually transmitted diseases), if you re at risk.     Shots: Get a flu shot each year. Get a tetanus shot every 10 years.     Nutrition:    Eat at least 5 servings of fruits and vegetables daily.     Eat whole-grain bread, whole-wheat pasta and brown rice instead of white grains and rice.     Talk to your provider about Calcium and Vitamin D.     Lifestyle    Exercise for at least 150 minutes a week (30 minutes a day, 5 days a week). This will help you control your weight and prevent disease.     Limit alcohol to one drink per day.     No smoking.     Wear sunscreen to prevent skin cancer.     See your dentist every six months for an exam and cleaning.     See your eye doctor every 1 to 2 years.  Everything looks fine!    Refills of medications have been faxed to your pharmacy.     I'll get back to you with lab results soon, especially if there is anything of concern.      See you in a year, sooner if problems.      COUNSELING:  Reviewed preventive health counseling, as reflected in patient instructions       Regular exercise       Hearing screening       Alcohol Use       Colon cancer screening       Prostate cancer screening     reports that he has never smoked. He has never used smokeless tobacco.    Estimated body mass index is 39.45 kg/(m^2) as calculated from the following:    Height as of this encounter: 6' 1\" (1.854 m).    Weight as of this encounter: 299 lb (135.6 kg).   Weight management plan: Discussed healthy diet and exercise guidelines and patient will follow up in 12 months in clinic to re-evaluate.    Counseling Resources:  ATP " IV Guidelines  Pooled Cohorts Equation Calculator  FRAX Risk Assessment  ICSI Preventive Guidelines  Dietary Guidelines for Americans, 2010  USDA's MyPlate  ASA Prophylaxis  Lung CA Screening    Obdulio Ingram MD  [facility]    This document serves as a record of the services and decisions personally performed and made by Obdulio Ingram MD. It was created on their behalf by Angely Briones, a trained medical scribe. The creation of this document is based the provider's statements to the medical scribe.  Angely Briones April 28, 2017 12:18 PM

## 2017-04-29 LAB
T4 FREE SERPL-MCNC: 1.15 NG/DL (ref 0.76–1.46)
TSH SERPL DL<=0.005 MIU/L-ACNC: 4.4 MU/L (ref 0.4–4)

## 2017-04-29 ASSESSMENT — PATIENT HEALTH QUESTIONNAIRE - PHQ9: SUM OF ALL RESPONSES TO PHQ QUESTIONS 1-9: 0

## 2017-04-29 ASSESSMENT — ANXIETY QUESTIONNAIRES: GAD7 TOTAL SCORE: 0

## 2017-05-22 ENCOUNTER — ANTICOAGULATION THERAPY VISIT (OUTPATIENT)
Dept: ANTICOAGULATION | Facility: CLINIC | Age: 62
End: 2017-05-22
Payer: COMMERCIAL

## 2017-05-22 DIAGNOSIS — Z79.01 LONG-TERM (CURRENT) USE OF ANTICOAGULANTS: ICD-10-CM

## 2017-05-22 DIAGNOSIS — Z95.2 AORTIC VALVE PROSTHESIS PRESENT: ICD-10-CM

## 2017-05-22 LAB — INR POINT OF CARE: 3.2 (ref 0.86–1.14)

## 2017-05-22 PROCEDURE — 36416 COLLJ CAPILLARY BLOOD SPEC: CPT

## 2017-05-22 PROCEDURE — 85610 PROTHROMBIN TIME: CPT | Mod: QW

## 2017-05-22 PROCEDURE — 99207 ZZC NO CHARGE NURSE ONLY: CPT

## 2017-05-22 NOTE — MR AVS SNAPSHOT
Todd S Aschoff   5/22/2017 9:15 AM   Anticoagulation Therapy Visit    Description:  61 year old male   Provider[facility]  [facility]   Department:  [facility]           INR as of 5/22/2017     Today's INR 3.2      Anticoagulation Summary as of 5/22/2017     INR goal 2.5-3.5   Today's INR 3.2   Full instructions 5/27: 5 mg; 6/3: 5 mg; Otherwise 7.5 mg on Wed, Sat; 5 mg all other days   Next INR check 6/8/2017    Indications   Long-term (current) use of anticoagulants [Z79.01] [Z79.01]  Aortic valve prosthesis present [Z95.2]  Atrial fibrillation (H) [I48.91]         Your next Anticoagulation Clinic appointment(s)     Jun 08, 2017  8:30 AM CDT   [facility]agulation Visit with [facility]   [facility] ([facility])    [address] [facility] [address]  [facility] [id number] [phone]              Contact Numbers     [facility] Phone Numbers:  Anticoagulation Clinic Appointments : [phone]  Anticoagulation Nurse: [phone]         May 2017 Details    Sun Mon Tue Wed Thu Fri Sat      1               2               3               4               5               6                 7               8               9               10               11               12               13                 14               15               16               17               18               19               20                 21               22      5 mg   See details      23      5 mg         24      7.5 mg         25      5 mg         26      5 mg         27      5 mg           28      5 mg         29      5 mg         30      5 mg         31      7.5 mg             Date Details   05/22 This INR check               How to take your warfarin dose     To take:  5 mg Take 1 of the 5 mg tablets.    To take:  7.5 mg Take 1.5 of the 5 mg tablets.           June 2017 Details    Sun Mon Tue Wed Thu Fri Sat         1      5 mg         2      5 mg         3      5  mg           4      5 mg         5      5 mg         6      5 mg         7      7.5 mg         8            9               10                 11               12               13               14               15               16               17                 18               19               20               21               22               23               24                 25               26               27               28               29               30                 Date Details   No additional details    Date of next INR:  6/8/2017         How to take your warfarin dose     To take:  5 mg Take 1 of the 5 mg tablets.    To take:  7.5 mg Take 1.5 of the 5 mg tablets.

## 2017-05-22 NOTE — PROGRESS NOTES
ANTICOAGULATION FOLLOW-UP CLINIC VISIT    Patient Name:  Todd S Aschoff  Date:  5/22/2017  Contact Type:  Face to Face    SUBJECTIVE:     Patient Findings     Positives Antibiotic use or infection (Pt reports completing abx for removal of back cyst that was infected.), No Problem Findings           OBJECTIVE    INR Protime   Date Value Ref Range Status   05/22/2017 3.2 (A) 0.86 - 1.14 Final       ASSESSMENT / PLAN  INR assessment THER    Recheck INR In: 3 WEEKS    INR Location Clinic      Anticoagulation Summary as of 5/22/2017     INR goal 2.5-3.5   Today's INR 3.2   Maintenance plan 7.5 mg (5 mg x 1.5) on Wed, Sat; 5 mg (5 mg x 1) all other days   Full instructions 5/27: 5 mg; 6/3: 5 mg; Otherwise 7.5 mg on Wed, Sat; 5 mg all other days   Weekly total 40 mg   Plan last modified Amanda Wheeler RN (2/8/2017)   Next INR check 6/8/2017   Priority INR   Target end date     Indications   Long-term (current) use of anticoagulants [Z79.01] [Z79.01]  Aortic valve prosthesis present [Z95.2]  Atrial fibrillation (H) [I48.91]         Anticoagulation Episode Summary     INR check location     Preferred lab     Send INR reminders to [facility]    Comments       Anticoagulation Care Providers     Provider Role Specialty Phone number    Obdulio Ingram MD [facility] Internal Medicine [phone]            See the Encounter Report to view Anticoagulation Flowsheet and Dosing Calendar (Go to Encounters tab in chart review, and find the Anticoagulation Therapy Visit)    Dosage adjustment made based on physician directed care plan.    Lucina Andrew RN

## 2017-06-06 DIAGNOSIS — M54.50 CHRONIC LOW BACK PAIN: Primary | ICD-10-CM

## 2017-06-06 DIAGNOSIS — G89.29 CHRONIC LOW BACK PAIN: Primary | ICD-10-CM

## 2017-06-06 DIAGNOSIS — J31.0 CHRONIC RHINITIS: ICD-10-CM

## 2017-06-06 RX ORDER — CETIRIZINE HYDROCHLORIDE 10 MG/1
TABLET ORAL
Qty: 30 TABLET | Refills: 6 | Status: SHIPPED | OUTPATIENT
Start: 2017-06-06 | End: 2017-12-27

## 2017-06-06 RX ORDER — ACETAMINOPHEN 650 MG/1
TABLET, FILM COATED, EXTENDED RELEASE ORAL
Qty: 180 TABLET | Refills: 2 | Status: SHIPPED | OUTPATIENT
Start: 2017-06-06 | End: 2018-06-15

## 2017-06-06 NOTE — TELEPHONE ENCOUNTER
Acetaminophen 650 mg tabs      Last Written Prescription Date: 1/8/16  Last Fill Quantity: 250,  # refills: 3   Last Office Visit with [facility] or [facility] prescribing provider: 4/28/17      Cetirizine (zyrtec)      Last Written Prescription Date: 4/17/17  Last Fill Quantity: 30,  # refills: 0   Last Office Visit with [facility], [facility] or [facility] prescribing provider: 4/28/17    Prescription approved per [facility] Refill Protocol.

## 2017-06-08 ENCOUNTER — ANTICOAGULATION THERAPY VISIT (OUTPATIENT)
Dept: ANTICOAGULATION | Facility: CLINIC | Age: 62
End: 2017-06-08
Payer: COMMERCIAL

## 2017-06-08 DIAGNOSIS — Z79.01 LONG-TERM (CURRENT) USE OF ANTICOAGULANTS: ICD-10-CM

## 2017-06-08 DIAGNOSIS — Z95.2 AORTIC VALVE PROSTHESIS PRESENT: ICD-10-CM

## 2017-06-08 LAB — INR POINT OF CARE: 3.9 (ref 0.86–1.14)

## 2017-06-08 PROCEDURE — 85610 PROTHROMBIN TIME: CPT | Mod: QW

## 2017-06-08 PROCEDURE — 99207 ZZC NO CHARGE NURSE ONLY: CPT

## 2017-06-08 PROCEDURE — 36416 COLLJ CAPILLARY BLOOD SPEC: CPT

## 2017-06-08 NOTE — PROGRESS NOTES
ANTICOAGULATION FOLLOW-UP CLINIC VISIT    Patient Name:  Todd S Aschoff  Date:  6/8/2017  Contact Type:  Face to Face    SUBJECTIVE:     Patient Findings     Positives Change in diet/appetite (Pt reports fewer greens the last few days.), Unexplained INR or factor level change           OBJECTIVE    INR Protime   Date Value Ref Range Status   06/08/2017 3.9 (A) 0.86 - 1.14 Final       ASSESSMENT / PLAN  INR assessment SUPRA    Recheck INR In: 2 WEEKS    INR Location Clinic      Anticoagulation Summary as of 6/8/2017     INR goal 2.5-3.5   Today's INR 3.9!   Maintenance plan 7.5 mg (5 mg x 1.5) on Wed, Sat; 5 mg (5 mg x 1) all other days   Full instructions 6/10: 5 mg; 6/14: 5 mg; 6/17: 5 mg; 6/21: 5 mg; Otherwise 7.5 mg on Wed, Sat; 5 mg all other days   Weekly total 40 mg   Plan last modified Amanda Wheeler RN (2/8/2017)   Next INR check 6/22/2017   Priority INR   Target end date     Indications   Long-term (current) use of anticoagulants [Z79.01] [Z79.01]  Aortic valve prosthesis present [Z95.2]  Atrial fibrillation (H) [I48.91]         Anticoagulation Episode Summary     INR check location     Preferred lab     Send INR reminders to [facility]    Comments       Anticoagulation Care Providers     Provider Role Specialty Phone number    Obdulio Ingram MD [facility] Internal Medicine [phone]            See the Encounter Report to view Anticoagulation Flowsheet and Dosing Calendar (Go to Encounters tab in chart review, and find the Anticoagulation Therapy Visit)    Dosage adjustment made based on physician directed care plan.    Lucina Andrew RN

## 2017-06-08 NOTE — MR AVS SNAPSHOT
Todd S Aschoff   6/8/2017 8:30 AM   Anticoagulation Therapy Visit    Description:  61 year old male   Provider[facility]  [facility]   Department:  [facility]           INR as of 6/8/2017     Today's INR 3.9!      Anticoagulation Summary as of 6/8/2017     INR goal 2.5-3.5   Today's INR 3.9!   Full instructions 6/10: 5 mg; 6/14: 5 mg; 6/17: 5 mg; 6/21: 5 mg; Otherwise 7.5 mg on Wed, Sat; 5 mg all other days   Next INR check 6/22/2017    Indications   Long-term (current) use of anticoagulants [Z79.01] [Z79.01]  Aortic valve prosthesis present [Z95.2]  Atrial fibrillation (H) [I48.91]         Your next Anticoagulation Clinic appointment(s)     Jun 22, 2017  1:45 PM CDT   [facility] Visit with [facility]   [facility] ([facility])    [address] [facility] [address]  [facility] [address] [phone]              Contact Numbers     [facility] Phone Numbers:  Anticoagulation Clinic Appointments : [phone]  Anticoagulation Nurse: [phone]         June 2017 Details    Sun Mon Tue Wed Thu Fri Sat         1               2               3                 4               5               6               7               8      5 mg   See details      9      5 mg         10      5 mg           11      5 mg         12      5 mg         13      5 mg         14      5 mg         15      5 mg         16      5 mg         17      5 mg           18      5 mg         19      5 mg         20      5 mg         21      5 mg         22            23               24                 25               26               27               28               29               30                 Date Details   06/08 This INR check       Date of next INR:  6/22/2017         How to take your warfarin dose     To take:  5 mg Take 1 of the 5 mg tablets.

## 2017-06-22 ENCOUNTER — TELEPHONE (OUTPATIENT)
Dept: ANTICOAGULATION | Facility: CLINIC | Age: 62
End: 2017-06-22

## 2017-06-22 ENCOUNTER — ANTICOAGULATION THERAPY VISIT (OUTPATIENT)
Dept: ANTICOAGULATION | Facility: CLINIC | Age: 62
End: 2017-06-22
Payer: COMMERCIAL

## 2017-06-22 DIAGNOSIS — Z95.2 AORTIC VALVE PROSTHESIS PRESENT: ICD-10-CM

## 2017-06-22 DIAGNOSIS — Z79.01 LONG-TERM (CURRENT) USE OF ANTICOAGULANTS: ICD-10-CM

## 2017-06-22 DIAGNOSIS — I48.91 ATRIAL FIBRILLATION, UNSPECIFIED TYPE (H): Primary | ICD-10-CM

## 2017-06-22 LAB — INR POINT OF CARE: 3.5 (ref 0.86–1.14)

## 2017-06-22 PROCEDURE — 99207 ZZC NO CHARGE NURSE ONLY: CPT

## 2017-06-22 PROCEDURE — 85610 PROTHROMBIN TIME: CPT | Mod: QW

## 2017-06-22 PROCEDURE — 36416 COLLJ CAPILLARY BLOOD SPEC: CPT

## 2017-06-22 NOTE — TELEPHONE ENCOUNTER
For insurance purposes, an annual INR referral is required. Please sign the order and route back to the INR Clinic. Lucina Andrew RN

## 2017-06-22 NOTE — MR AVS SNAPSHOT
Todd S Aschoff   6/22/2017 1:45 PM   Anticoagulation Therapy Visit    Description:  61 year old male   Provider[facility]  [facility]   Department:  [facility]           INR as of 6/22/2017     Today's INR 3.5      Anticoagulation Summary as of 6/22/2017     INR goal 2.5-3.5   Today's INR 3.5   Full instructions 6/24: 5 mg; 6/28: 5 mg; 7/1: 5 mg; 7/5: 5 mg; Otherwise 7.5 mg on Wed, Sat; 5 mg all other days   Next INR check 7/6/2017    Indications   Long-term (current) use of anticoagulants [Z79.01] [Z79.01]  Aortic valve prosthesis present [Z95.2]  Atrial fibrillation (H) [I48.91]         Your next Anticoagulation Clinic appointment(s)     Jul 06, 2017  3:15 PM CDT   [facility] with [facility]   [facility] ([facility])    [address] [facility] [address]  [facility] [address] [phone]              Contact Numbers     [facility] Phone Numbers:  Anticoagulation Clinic Appointments : [phone]  Anticoagulation Nurse: [phone]         June 2017 Details    Sun Mon Tue Wed Thu Fri Sat         1               2               3                 4               5               6               7               8               9               10                 11               12               13               14               15               16               17                 18               19               20               21               22      5 mg   See details      23      5 mg         24      5 mg           25      5 mg         26      5 mg         27      5 mg         28      5 mg         29      5 mg         30      5 mg           Date Details   06/22 This INR check               How to take your warfarin dose     To take:  5 mg Take 1 of the 5 mg tablets.           July 2017 Details    Sun Mon Tue Wed Thu Fri Sat           1      5 mg           2      5 mg         3      5 mg         4      5 mg         5      5 mg          6            7               8                 9               10               11               12               13               14               15                 16               17               18               19               20               21               22                 23               24               25               26               27               28               29                 30               31                     Date Details   No additional details    Date of next INR:  7/6/2017         How to take your warfarin dose     To take:  5 mg Take 1 of the 5 mg tablets.

## 2017-06-22 NOTE — PROGRESS NOTES
ANTICOAGULATION FOLLOW-UP CLINIC VISIT    Patient Name:  Todd S Aschoff  Date:  6/22/2017  Contact Type:  Face to Face    SUBJECTIVE:     Patient Findings     Positives No Problem Findings           OBJECTIVE    INR Protime   Date Value Ref Range Status   06/22/2017 3.5 (A) 0.86 - 1.14 Final       ASSESSMENT / PLAN  INR assessment THER    Recheck INR In: 2 WEEKS    INR Location Clinic      Anticoagulation Summary as of 6/22/2017     INR goal 2.5-3.5   Today's INR 3.5   Maintenance plan 7.5 mg (5 mg x 1.5) on Wed, Sat; 5 mg (5 mg x 1) all other days   Full instructions 6/24: 5 mg; 6/28: 5 mg; 7/1: 5 mg; 7/5: 5 mg; Otherwise 7.5 mg on Wed, Sat; 5 mg all other days   Weekly total 40 mg   Plan last modified Amanda Wheeler RN (2/8/2017)   Next INR check 7/6/2017   Priority INR   Target end date     Indications   Long-term (current) use of anticoagulants [Z79.01] [Z79.01]  Aortic valve prosthesis present [Z95.2]  Atrial fibrillation (H) [I48.91]         Anticoagulation Episode Summary     INR check location     Preferred lab     Send INR reminders to [facility]    Comments       Anticoagulation Care Providers     Provider Role Specialty Phone number    Obdulio Ingram MD [facility] Internal Medicine [phone]            See the Encounter Report to view Anticoagulation Flowsheet and Dosing Calendar (Go to Encounters tab in chart review, and find the Anticoagulation Therapy Visit)    Dosage adjustment made based on physician directed care plan.    Lucina Andrew RN

## 2017-06-27 ENCOUNTER — OFFICE VISIT (OUTPATIENT)
Dept: UROLOGY | Facility: CLINIC | Age: 62
End: 2017-06-27
Payer: COMMERCIAL

## 2017-06-27 VITALS
BODY MASS INDEX: 39.36 KG/M2 | SYSTOLIC BLOOD PRESSURE: 116 MMHG | DIASTOLIC BLOOD PRESSURE: 70 MMHG | HEART RATE: 62 BPM | HEIGHT: 73 IN | WEIGHT: 297 LBS

## 2017-06-27 DIAGNOSIS — N40.0 BENIGN PROSTATIC HYPERPLASIA WITHOUT LOWER URINARY TRACT SYMPTOMS: Primary | ICD-10-CM

## 2017-06-27 DIAGNOSIS — R97.20 ELEVATED PROSTATE SPECIFIC ANTIGEN (PSA): ICD-10-CM

## 2017-06-27 LAB
ALBUMIN UR-MCNC: ABNORMAL MG/DL
APPEARANCE UR: CLEAR
BILIRUB UR QL STRIP: NEGATIVE
COLOR UR AUTO: YELLOW
GLUCOSE UR STRIP-MCNC: NEGATIVE MG/DL
HGB UR QL STRIP: ABNORMAL
KETONES UR STRIP-MCNC: NEGATIVE MG/DL
LEUKOCYTE ESTERASE UR QL STRIP: NEGATIVE
NITRATE UR QL: NEGATIVE
PH UR STRIP: 5 PH (ref 5–7)
RESIDUAL VOLUME (RV) (EXTERNAL): 30
SP GR UR STRIP: >1.03 (ref 1–1.03)
URN SPEC COLLECT METH UR: ABNORMAL
UROBILINOGEN UR STRIP-ACNC: 0.2 EU/DL (ref 0.2–1)

## 2017-06-27 PROCEDURE — 81003 URINALYSIS AUTO W/O SCOPE: CPT | Performed by: UROLOGY

## 2017-06-27 PROCEDURE — 51798 US URINE CAPACITY MEASURE: CPT | Performed by: UROLOGY

## 2017-06-27 PROCEDURE — 99212 OFFICE O/P EST SF 10 MIN: CPT | Mod: 25 | Performed by: UROLOGY

## 2017-06-27 RX ORDER — TRAZODONE HYDROCHLORIDE 50 MG/1
TABLET, FILM COATED ORAL
Refills: 2 | COMMUNITY
Start: 2017-06-01 | End: 2017-12-28

## 2017-06-27 ASSESSMENT — PAIN SCALES - GENERAL: PAINLEVEL: NO PAIN (0)

## 2017-06-27 NOTE — MR AVS SNAPSHOT
After Visit Summary   6/27/2017    Todd S Aschoff    MRN: [id number]           Patient Information     Date Of Birth          1955        Visit Information        Provider Department      6/27/2017 8:10 AM Geraldo Gonzales MD [facility] [place]        Today's Diagnoses     Benign prostatic hyperplasia without lower urinary tract symptoms    -  1    Elevated prostate specific antigen (PSA)           Follow-ups after your visit        Your next 10 appointments already scheduled     Jul 02, 2017 11:30 AM CDT   (Arrive by 11:15 AM)   MR PROSTATE with [id number]   [facility] [place] [facility] ([facility] [place])    [address] [address] [phone]           Take your medicines as usual, unless your doctor tells you not to. Bring a list of your current medicines to your exam (including vitamins, minerals and over-the-counter drugs).  You will be given intravenous contrast for this exam. To prepare:   The day before your exam, drink extra fluids at least six 8-ounce glasses (unless your doctor tells you to restrict your fluids).   Have a blood test (creatinine test) within 30 days of your exam. Go to your clinic or Diagnostic Imaging Department for this test.  The MRI machine uses a strong magnet. Please wear clothes without metal (snaps, zippers). A sweatsuit works well, or we may give you a hospital gown.  Please remove any body piercings and hair extensions before you arrive. You will also remove watches, jewelry, hairpins, wallets, dentures, partial dental plates and hearing aids. You may wear contact lenses, and you may be able to wear your rings. We have a safe place to keep your personal items, but it is safer to leave them at home.   **IMPORTANT** THE INSTRUCTIONS BELOW ARE ONLY FOR THOSE PATIENTS WHO HAVE BEEN TOLD THEY WILL RECEIVE SEDATION OR GENERAL ANESTHESIA DURING THEIR MRI PROCEDURE:  IF YOU  WILL RECEIVE SEDATION (take medicine to help you relax during your exam):   You must get the medicine from your doctor before you arrive. Bring the medicine to the exam. Do not take it at home.   Arrive one hour early. Bring someone who can take you home after the test. Your medicine will make you sleepy. After the exam, you may not drive, take a bus or take a taxi by yourself.   No eating 8 hours before your exam. You may have clear liquids up until 4 hours before your exam. (Clear liquids include water, clear tea, black coffee and fruit juice without pulp.)  IF YOU WILL RECEIVE ANESTHESIA (be asleep for your exam):   Arrive 1 1/2 hours early. Bring someone who can take you home after the test. You may not drive, take a bus or take a taxi by yourself.   No eating 8 hours before your exam. You may have clear liquids up until 4 hours before your exam. (Clear liquids include water, clear tea, black coffee and fruit juice without pulp.)  Please call the Imaging Department at your exam site with any questions.            Jul 06, 2017  3:15 PM CDT   Anticoagulation Visit with [facility]   [facility] ([facility])    [address] [facility] [address]  [facility] [address] [phone]              Future tests that were ordered for you today     Open Future Orders        Priority Expected Expires Ordered    MR Prostate [[id number]] Routine  6/27/2018 6/27/2017            Who to contact     If you have questions or need follow up information about today's clinic visit or your schedule please contact [facility] [place] directly at [phone].  Normal or non-critical lab and imaging results will be communicated to you by MyChart, letter or phone within 4 business days after the clinic has received the results. If you do not hear from us within 7 days, please contact the clinic through MyChart or phone. If you have a critical or abnormal lab  "result, we will notify you by phone as soon as possible.  Submit refill requests through [employer] or call your pharmacy and they will forward the refill request to us. Please allow 3 business days for your refill to be completed.          Additional Information About Your Visit        [employer] Information     [employer] gives you secure access to your electronic health record. If you see a primary care provider, you can also send messages to your care team and make appointments. If you have questions, please call your primary care clinic.  If you do not have a primary care provider, please call [phone] and they will assist you.        Care EveryWhere ID     This is your Care EveryWhere ID. This could be used by other organizations to access your [facility] medical records  [id number]        Your Vitals Were     Pulse Height BMI (Body Mass Index)             62 1.854 m (6' 1\") 39.18 kg/m2          Blood Pressure from Last 3 Encounters:   06/27/17 116/70   04/28/17 110/70   03/13/17 115/76    Weight from Last 3 Encounters:   06/27/17 134.7 kg (297 lb)   04/28/17 135.6 kg (299 lb)   03/06/17 (!) 139.3 kg (307 lb)              We Performed the Following     MEASURE POST-VOID RESIDUAL URINE/BLADDER CAPACITY, US NON-IMAGING (01132)     UA without Microscopic [[id number]]        Primary Care Provider Office Phone # Fax #    Obdulio Ingram [phone]       [facility] [place] [facility] [address]  [facility] [address]        Equal Access to Services     PERRI WALTON : Hadii aad ku hadasho Soomaali, waaxda luqadaha, qaybta kaalmada adeegyada, waxay geoffreyin haynathanieln margarito molina . So [facility] [phone].    ATENCIÓN: Si habla español, tiene a thomas disposición servicios gratuitos de asistencia lingüística. Llame al [phone].    We comply with applicable federal civil rights laws and [place] laws. We do not discriminate on the basis of race, color, national origin, age, disability sex, sexual " orientation or gender identity.            Thank you!     Thank you for choosing [facility] [place]  for your care. Our goal is always to provide you with excellent care. Hearing back from our patients is one way we can continue to improve our services. Please take a few minutes to complete the written survey that you may receive in the mail after your visit with us. Thank you!             Your Updated Medication List - Protect others around you: Learn how to safely use, store and throw away your medicines at www.disposemymeds.org.          This list is accurate as of: 6/27/17  9:03 AM.  Always use your most recent med list.                   Brand Name Dispense Instructions for use Diagnosis    * acetaminophen 650 MG 8 hour tablet    ACETAMINOPHEN 8 HOUR    250 tablet    Take 650 mg by mouth 2 times daily    Chronic low back pain       * MAPAP ARTHRITIS PAIN 650 MG CR tablet   Generic drug:  acetaminophen     180 tablet    TAKE 650 MG BY MOUTH 2 TIMES DAILY    Chronic low back pain       azelastine 0.1 % spray    ASTELIN    3 Bottle    Spray 2 sprays in nostril 2 times daily as needed    Chronic rhinitis       cetirizine 10 MG tablet    zyrTEC    30 tablet    TAKE ONE TABLET BY MOUTH ONE TIME DAILY AS NEEDED.    Chronic rhinitis       cyclobenzaprine 10 MG tablet    FLEXERIL    270 tablet    Take 1 tablet (10 mg) by mouth 3 times daily as needed for muscle spasms    Muscle spasm       diazepam 5 MG tablet    VALIUM    270 tablet    TAKE 1 TABLET BY MOUTH EVERY 8 HOURS AS NEEDED FOR ANXIETY OR MUSCLE SPASMS MAXIMUM #270 EVERY 90 DAYS.    Back muscle spasm       donepezil 10 MG tablet    ARICEPT    90 tablet    Take 1 tablet (10 mg) by mouth At Bedtime    Memory difficulties       enoxaparin 120 MG/0.8ML injection    enoxaparin    20 Syringe    Inject 0.9 mLs (135 mg) Subcutaneous every 12 hours    Aortic valve prosthesis present, Long term current use of anticoagulant therapy        guanFACINE 1 MG tablet    TENEX    90 tablet    Take 1 tablet (1 mg) by mouth At Bedtime    Major depressive disorder, recurrent episode, in full remission (H)       isometheptene-acetaminophen-dichloralphenazone -100 MG per capsule    MIDRIN    30 capsule    Take 1 capsule by mouth 4 times daily as needed    Headache(784.0)       levothyroxine 150 MCG tablet    SYNTHROID/LEVOTHROID    90 tablet    TAKE ONE TABLET BY MOUTH ONE TIME DAILY    Acquired hypothyroidism       mirabegron 50 MG 24 hr tablet    MYRBETRIQ    90 tablet    Take 1 tablet (50 mg) by mouth daily    Overactive bladder       pregabalin 100 MG capsule    LYRICA    270 capsule    Take 1 capsule (100 mg) by mouth 3 times daily Do not give if somnolent/sedated.    Chronic low back pain with sciatica, sciatica laterality unspecified, unspecified back pain laterality       propafenone 150 MG Tabs tablet    RYTHMOL    270 tablet    Take 1 tablet (150 mg) by mouth every 8 hours    Chronic atrial fibrillation (H)       senna-docusate 8.6-50 MG per tablet    SENOKOT-S;PERICOLACE    60 tablet    Take 1 tablet by mouth 2 times daily as needed for constipation    Constipation, unspecified constipation type       simvastatin 40 MG tablet    ZOCOR    90 tablet    Take 1 tablet (40 mg) by mouth At Bedtime    Hyperlipidemia LDL goal <130       * traZODone 100 MG tablet    DESYREL    90 tablet    Take 1 tablet (100 mg) by mouth nightly as needed for sleep    Insomnia, unspecified insomnia       * traZODone 50 MG tablet    DESYREL          venlafaxine 150 MG Tb24 24 hr tablet    EFFEXOR-ER    90 each    Take 1 tablet (150 mg) by mouth daily (with breakfast)    Major depressive disorder, recurrent episode, in full remission (H)       warfarin 5 MG tablet    COUMADIN    100 tablet    take 1 tablet ( 5mg) daily except 1 and 1/2 tablet (7.5mg) on Wednesdays and Saturdays  or as intructed by INR clinic    Current use of long term anticoagulation       * Notice:   This list has 4 medication(s) that are the same as other medications prescribed for you. Read the directions carefully, and ask your doctor or other care provider to review them with you.

## 2017-06-27 NOTE — PROGRESS NOTES
Mr. Aschoff is a 61-year-old male with a father who had prostate cancer. 6 years ago he underwent biopsies of the prostate that were benign except for a few glands that were atypical in the right mid gland medially. Earlier in the year he had Escherichia coli cystitis and the source was felt to be from the prostate. He was treated with a several week course of Cipro. He now returns with a normal urinalysis. Repeat PSA was down to 5.84 from 13.8.  Other past medical history: Overactive bladder-doing well on myrbetriq, history of alcohol dependence, aortic valve prosthesis, total joint replacements, BPH, atrial fibrillation, coagulation disorder, dissection of thoracic aorta, depression, hyperlipidemia, arthritis, sciatica, hypothyroidism, nonsmoker  Medications: All reviewed  Allergies: None  Exam: Normal appearance, normal vital signs, alert and oriented, normocephalic, normal respirations, neuro grossly intact. Normal sphincter tone, no rectal mass or impaction, benign feeling and enlarged prostate, seminal vesicles not palpable  Urinalysis: Normal  Postvoid residual: 30 cc  Assessment: Family history of prostate cancer, elevated PSA after presumed prostatitis-discussed follow-up MRI versus repeat PSA  Plan: [facility] MRI of prostate now-call with results. Follow PSA yearly and if MRI is not suspicious

## 2017-06-27 NOTE — LETTER
6/27/2017       RE: Todd S Aschoff  [address]  [place] [address]     Dear Colleague,    Thank you for referring your patient, Todd S Aschoff, to the [facility] [place] at [facility]. Please see a copy of my visit note below.    Mr. Aschoff is a 61-year-old male with a father who had prostate cancer. 6 years ago he underwent biopsies of the prostate that were benign except for a few glands that were atypical in the right mid gland medially. Earlier in the year he had Escherichia coli cystitis and the source was felt to be from the prostate. He was treated with a several week course of Cipro. He now returns with a normal urinalysis. Repeat PSA was down to 5.84 from 13.8.  Other past medical history: Overactive bladder-doing well on myrbetriq, history of alcohol dependence, aortic valve prosthesis, total joint replacements, BPH, atrial fibrillation, coagulation disorder, dissection of thoracic aorta, depression, hyperlipidemia, arthritis, sciatica, hypothyroidism, nonsmoker  Medications: All reviewed  Allergies: None  Exam: Normal appearance, normal vital signs, alert and oriented, normocephalic, normal respirations, neuro grossly intact. Normal sphincter tone, no rectal mass or impaction, benign feeling and enlarged prostate, seminal vesicles not palpable  Urinalysis: Normal  Postvoid residual: 30 cc  Assessment: Family history of prostate cancer, elevated PSA after presumed prostatitis-discussed follow-up MRI versus repeat PSA  Plan: [facility] of prostate now-call with results. Follow PSA yearly and if MRI is not suspicious    Again, thank you for allowing me to participate in the care of your patient.      Sincerely,    Geraldo Gonzales MD

## 2017-07-18 DIAGNOSIS — M62.830 BACK MUSCLE SPASM: ICD-10-CM

## 2017-07-18 RX ORDER — DIAZEPAM 5 MG
TABLET ORAL
Qty: 270 TABLET | Refills: 0 | Status: CANCELLED | OUTPATIENT
Start: 2017-07-18

## 2017-07-18 NOTE — TELEPHONE ENCOUNTER
Valium      Last Written Prescription Date:  4/7/17  Last Fill Quantity: 270,   # refills: 0  Last Office Visit with [facility] or  Health prescribing provider: 4/28/17  Future Office visit:       Routing refill request to provider for review/approval because:  Drug not on the [facility] or  Health refill protocol or controlled substance

## 2017-07-19 ENCOUNTER — ANTICOAGULATION THERAPY VISIT (OUTPATIENT)
Dept: ANTICOAGULATION | Facility: CLINIC | Age: 62
End: 2017-07-19
Payer: COMMERCIAL

## 2017-07-19 DIAGNOSIS — I48.91 ATRIAL FIBRILLATION, UNSPECIFIED TYPE (H): ICD-10-CM

## 2017-07-19 DIAGNOSIS — Z95.2 AORTIC VALVE PROSTHESIS PRESENT: ICD-10-CM

## 2017-07-19 DIAGNOSIS — Z79.01 LONG-TERM (CURRENT) USE OF ANTICOAGULANTS: ICD-10-CM

## 2017-07-19 LAB — INR POINT OF CARE: 3.1 (ref 0.86–1.14)

## 2017-07-19 PROCEDURE — 99207 ZZC NO CHARGE NURSE ONLY: CPT

## 2017-07-19 PROCEDURE — 85610 PROTHROMBIN TIME: CPT | Mod: QW

## 2017-07-19 PROCEDURE — 36416 COLLJ CAPILLARY BLOOD SPEC: CPT

## 2017-07-19 RX ORDER — DIAZEPAM 5 MG
TABLET ORAL
Qty: 270 TABLET | Refills: 0 | Status: SHIPPED | OUTPATIENT
Start: 2017-07-19 | End: 2017-10-19

## 2017-07-19 NOTE — MR AVS SNAPSHOT
Todd S Aschoff   7/19/2017 1:15 PM   Anticoagulation Therapy Visit    Description:  61 year old male   Provider[facility]  [facility]   Department:  [facility] Anti Coagulation           INR as of 7/19/2017     Today's INR 3.1      Anticoagulation Summary as of 7/19/2017     INR goal 2.5-3.5   Today's INR 3.1   Full instructions 5 mg every day   Next INR check 8/16/2017    Indications   Long-term (current) use of anticoagulants [Z79.01] [Z79.01]  Aortic valve prosthesis present [Z95.2]  Atrial fibrillation (H) [I48.91]         Your next Anticoagulation Clinic appointment(s)     Aug 16, 2017  1:30 PM CDT   Anticoagulation Visit with [facility]   [facility]    [address] [facility] [address]  [facility] [address] [phone]              Contact Numbers     [facility] Phone Numbers:  Anticoagulation Clinic Appointments : [phone]  Anticoagulation Nurse: [phone]         July 2017 Details    Sun Mon Tue Wed Thu Fri Sat           1                 2               3               4               5               6               7               8                 9               10               11               12               13               14               15                 16               17               18               19      5 mg   See details      20      5 mg         21      5 mg         22      5 mg           23      5 mg         24      5 mg         25      5 mg         26      5 mg         27      5 mg         28      5 mg         29      5 mg           30      5 mg         31      5 mg               Date Details   07/19 This INR check               How to take your warfarin dose     To take:  5 mg Take 1 of the 5 mg tablets.           August 2017 Details    Sun Mon Tue Wed Thu Fri Sat       1      5 mg         2      5 mg         3      5 mg         4      5 mg         5      5 mg           6      5 mg         7      5 mg          8      5 mg         9      5 mg         10      5 mg         11      5 mg         12      5 mg           13      5 mg         14      5 mg         15      5 mg         16            17               18               19                 20               21               22               23               24               25               26                 27               28               29               30               31                  Date Details   No additional details    Date of next INR:  8/16/2017         How to take your warfarin dose     To take:  5 mg Take 1 of the 5 mg tablets.

## 2017-07-19 NOTE — PROGRESS NOTES
ANTICOAGULATION FOLLOW-UP CLINIC VISIT    Patient Name:  Todd S Aschoff  Date:  7/19/2017  Contact Type:  Face to Face    SUBJECTIVE:     Patient Findings     Positives No Problem Findings           OBJECTIVE    INR Protime   Date Value Ref Range Status   07/19/2017 3.1 (A) 0.86 - 1.14 Final       ASSESSMENT / PLAN  INR assessment THER    Recheck INR In: 4 WEEKS    INR Location Clinic      Anticoagulation Summary as of 7/19/2017     INR goal 2.5-3.5   Today's INR 3.1   Maintenance plan 5 mg (5 mg x 1) every day   Full instructions 5 mg every day   Weekly total 35 mg   Plan last modified Amanda Wheeler RN (7/19/2017)   Next INR check 8/16/2017   Priority INR   Target end date     Indications   Long-term (current) use of anticoagulants [Z79.01] [Z79.01]  Aortic valve prosthesis present [Z95.2]  Atrial fibrillation (H) [I48.91]         Anticoagulation Episode Summary     INR check location     Preferred lab     Send INR reminders to [facility]    Comments       Anticoagulation Care Providers     Provider Role Specialty Phone number    Obdulio Ingram MD [facility] Internal Medicine [phone]            See the Encounter Report to view Anticoagulation Flowsheet and Dosing Calendar (Go to Encounters tab in chart review, and find the Anticoagulation Therapy Visit)    Dosage adjustment made based on physician directed care plan.    Amanda Wheeler RN

## 2017-08-16 ENCOUNTER — ANTICOAGULATION THERAPY VISIT (OUTPATIENT)
Dept: ANTICOAGULATION | Facility: CLINIC | Age: 62
End: 2017-08-16
Payer: COMMERCIAL

## 2017-08-16 DIAGNOSIS — Z95.2 AORTIC VALVE PROSTHESIS PRESENT: ICD-10-CM

## 2017-08-16 DIAGNOSIS — Z79.01 LONG-TERM (CURRENT) USE OF ANTICOAGULANTS: ICD-10-CM

## 2017-08-16 DIAGNOSIS — I48.91 ATRIAL FIBRILLATION, UNSPECIFIED TYPE (H): ICD-10-CM

## 2017-08-16 LAB — INR POINT OF CARE: 2.7 (ref 0.86–1.14)

## 2017-08-16 PROCEDURE — 85610 PROTHROMBIN TIME: CPT | Mod: QW

## 2017-08-16 PROCEDURE — 99207 ZZC NO CHARGE NURSE ONLY: CPT

## 2017-08-16 PROCEDURE — 36416 COLLJ CAPILLARY BLOOD SPEC: CPT

## 2017-08-16 NOTE — PROGRESS NOTES
ANTICOAGULATION FOLLOW-UP CLINIC VISIT    Patient Name:  Todd S Aschoff  Date:  8/16/2017  Contact Type:  Face to Face    SUBJECTIVE:     Patient Findings     Positives No Problem Findings           OBJECTIVE    INR Protime   Date Value Ref Range Status   08/16/2017 2.7 (A) 0.86 - 1.14 Final       ASSESSMENT / PLAN  INR assessment THER    Recheck INR In: 4 WEEKS    INR Location Clinic      Anticoagulation Summary as of 8/16/2017     INR goal 2.5-3.5   Today's INR 2.7   Maintenance plan 5 mg (5 mg x 1) every day   Full instructions 5 mg every day   Weekly total 35 mg   No change documented Amanda Wheeler RN   Plan last modified Amanda Wheeler RN (7/19/2017)   Next INR check 9/13/2017   Priority INR   Target end date     Indications   Long-term (current) use of anticoagulants [Z79.01] [Z79.01]  Aortic valve prosthesis present [Z95.2]  Atrial fibrillation (H) [I48.91]         Anticoagulation Episode Summary     INR check location     Preferred lab     Send INR reminders to [facility]    Comments       Anticoagulation Care Providers     Provider Role Specialty Phone number    Obdulio Ingram MD [facility] Internal Medicine [phone]            See the Encounter Report to view Anticoagulation Flowsheet and Dosing Calendar (Go to Encounters tab in chart review, and find the Anticoagulation Therapy Visit)    Dosage adjustment made based on physician directed care plan.    Amanda Wheeler RN

## 2017-08-16 NOTE — MR AVS SNAPSHOT
Todd S Aschoff   8/16/2017 1:30 PM   Anticoagulation Therapy Visit    Description:  61 year old male   Provider:  [facility]   Department:  [facility] Anti Coagulation           INR as of 8/16/2017     Today's INR 2.7      Anticoagulation Summary as of 8/16/2017     INR goal 2.5-3.5   Today's INR 2.7   Full instructions 5 mg every day   Next INR check 9/13/2017    Indications   Long-term (current) use of anticoagulants [Z79.01] [Z79.01]  Aortic valve prosthesis present [Z95.2]  Atrial fibrillation (H) [I48.91]         Your next Anticoagulation Clinic appointment(s)     Sep 13, 2017  1:30 PM CDT   Anticoagulation Visit with [facility]   [facility]    [address] [facility] [address]  [facility] [address] [phone]              Contact Numbers     [facility] Phone Numbers:  Anticoagulation Clinic Appointments : [phone]  Anticoagulation Nurse: [phone]         August 2017 Details    Sun Mon Tue Wed Thu Fri Sat       1               2               3               4               5                 6               7               8               9               10               11               12                 13               14               15               16      5 mg   See details      17      5 mg         18      5 mg         19      5 mg           20      5 mg         21      5 mg         22      5 mg         23      5 mg         24      5 mg         25      5 mg         26      5 mg           27      5 mg         28      5 mg         29      5 mg         30      5 mg         31      5 mg            Date Details   08/16 This INR check               How to take your warfarin dose     To take:  5 mg Take 1 of the 5 mg tablets.           September 2017 Details    Sun Mon Tue Wed Thu Fri Sat          1      5 mg         2      5 mg           3      5 mg         4      5 mg         5      5 mg         6      5 mg         7       5 mg         8      5 mg         9      5 mg           10      5 mg         11      5 mg         12      5 mg         13            14               15               16                 17               18               19               20               21               22               23                 24               25               26               27               28               29               30                Date Details   No additional details    Date of next INR:  9/13/2017         How to take your warfarin dose     To take:  5 mg Take 1 of the 5 mg tablets.

## 2017-09-08 DIAGNOSIS — R41.3 MEMORY DIFFICULTIES: ICD-10-CM

## 2017-09-11 RX ORDER — DONEPEZIL HYDROCHLORIDE 10 MG/1
TABLET, FILM COATED ORAL
Qty: 90 TABLET | Refills: 0 | Status: SHIPPED | OUTPATIENT
Start: 2017-09-11 | End: 2017-12-13

## 2017-09-11 NOTE — TELEPHONE ENCOUNTER
Donepezil    Last Written Prescription Date: 03/13/17  Last Fill Quantity: 90,  # refills: 1   Last Office Visit with [facility], [facility] or [facility] prescribing provider: 04/28/17

## 2017-09-13 ENCOUNTER — ANTICOAGULATION THERAPY VISIT (OUTPATIENT)
Dept: ANTICOAGULATION | Facility: CLINIC | Age: 62
End: 2017-09-13
Payer: COMMERCIAL

## 2017-09-13 DIAGNOSIS — I48.91 ATRIAL FIBRILLATION, UNSPECIFIED TYPE (H): ICD-10-CM

## 2017-09-13 DIAGNOSIS — Z79.01 LONG-TERM (CURRENT) USE OF ANTICOAGULANTS: ICD-10-CM

## 2017-09-13 DIAGNOSIS — Z95.2 AORTIC VALVE PROSTHESIS PRESENT: ICD-10-CM

## 2017-09-13 LAB — INR POINT OF CARE: 2.9 (ref 0.86–1.14)

## 2017-09-13 PROCEDURE — 36416 COLLJ CAPILLARY BLOOD SPEC: CPT

## 2017-09-13 PROCEDURE — 85610 PROTHROMBIN TIME: CPT | Mod: QW

## 2017-09-13 PROCEDURE — 99207 ZZC NO CHARGE NURSE ONLY: CPT

## 2017-09-13 NOTE — PROGRESS NOTES
ANTICOAGULATION FOLLOW-UP CLINIC VISIT    Patient Name:  Todd S Aschoff  Date:  9/13/2017  Contact Type:  Face to Face    SUBJECTIVE:     Patient Findings     Positives No Problem Findings           OBJECTIVE    INR Protime   Date Value Ref Range Status   09/13/2017 2.9 (A) 0.86 - 1.14 Final       ASSESSMENT / PLAN  INR assessment THER    Recheck INR In: 4 WEEKS    INR Location Clinic      Anticoagulation Summary as of 9/13/2017     INR goal 2.5-3.5   Today's INR 2.9   Maintenance plan 5 mg (5 mg x 1) every day   Full instructions 5 mg every day   Weekly total 35 mg   No change documented Amanda Wheeler RN   Plan last modified Amanda Wheeler RN (7/19/2017)   Next INR check 10/11/2017   Priority INR   Target end date     Indications   Long-term (current) use of anticoagulants [Z79.01] [Z79.01]  Aortic valve prosthesis present [Z95.2]  Atrial fibrillation (H) [I48.91]         Anticoagulation Episode Summary     INR check location     Preferred lab     Send INR reminders to [facility]    Comments       Anticoagulation Care Providers     Provider Role Specialty Phone number    Obdulio Ingram MD [facility] Internal Medicine [phone]            See the Encounter Report to view Anticoagulation Flowsheet and Dosing Calendar (Go to Encounters tab in chart review, and find the Anticoagulation Therapy Visit)    Dosage adjustment made based on physician directed care plan.    Amanda Wheeler RN

## 2017-09-13 NOTE — MR AVS SNAPSHOT
Todd S Aschoff   9/13/2017 1:30 PM   Anticoagulation Therapy Visit    Description:  61 year old male   Provider[facility]  [facility]   Department:  [facility]           INR as of 9/13/2017     Today's INR 2.9      Anticoagulation Summary as of 9/13/2017     INR goal 2.5-3.5   Today's INR 2.9   Full instructions 5 mg every day   Next INR check 10/11/2017    Indications   Long-term (current) use of anticoagulants [Z79.01] [Z79.01]  Aortic valve prosthesis present [Z95.2]  Atrial fibrillation (H) [I48.91]         Your next Anticoagulation Clinic appointment(s)     Sep 13, 2017  1:30 PM CDT   [facility] with [facility]   [facility] ([facility])    [address] [facility] [address]  [facility] [address] [phone]            Oct 11, 2017  1:30 PM CDT   [facility] Visit with [facility]   [facility] ([facility])    [address] [facility] [address]  [facility] [address] [phone]              Contact Numbers     [facility] Phone Numbers:  Anticoagulation Clinic Appointments : [phone]  Anticoagulation Nurse: [phone]         September 2017 Details    Sun Mon Tue Wed Thu Fri Sat          1               2                 3               4               5               6               7               8               9                 10               11               12               13      5 mg   See details      14      5 mg         15      5 mg         16      5 mg           17      5 mg         18      5 mg         19      5 mg         20      5 mg         21      5 mg         22      5 mg         23      5 mg           24      5 mg         25      5 mg         26      5 mg         27      5 mg         28      5 mg         29      5 mg         30      5 mg          Date Details   09/13 This INR check               How to take your warfarin dose     To take:  5 mg Take 1  of the 5 mg tablets.           October 2017 Details    Sun Mon Tue Wed Thu Fri Sat     1      5 mg         2      5 mg         3      5 mg         4      5 mg         5      5 mg         6      5 mg         7      5 mg           8      5 mg         9      5 mg         10      5 mg         11            12               13               14                 15               16               17               18               19               20               21                 22               23               24               25               26               27               28                 29               30               31                    Date Details   No additional details    Date of next INR:  10/11/2017         How to take your warfarin dose     To take:  5 mg Take 1 of the 5 mg tablets.

## 2017-09-23 ENCOUNTER — OFFICE VISIT (OUTPATIENT)
Dept: URGENT CARE | Facility: URGENT CARE | Age: 62
End: 2017-09-23
Payer: COMMERCIAL

## 2017-09-23 VITALS
HEART RATE: 66 BPM | DIASTOLIC BLOOD PRESSURE: 70 MMHG | OXYGEN SATURATION: 97 % | TEMPERATURE: 98.9 F | SYSTOLIC BLOOD PRESSURE: 124 MMHG | BODY MASS INDEX: 39.45 KG/M2 | RESPIRATION RATE: 12 BRPM | WEIGHT: 299 LBS

## 2017-09-23 DIAGNOSIS — B02.9 HERPES ZOSTER WITHOUT COMPLICATION: Primary | ICD-10-CM

## 2017-09-23 PROCEDURE — 99214 OFFICE O/P EST MOD 30 MIN: CPT | Performed by: PHYSICIAN ASSISTANT

## 2017-09-23 RX ORDER — HYDROCODONE BITARTRATE AND ACETAMINOPHEN 5; 325 MG/1; MG/1
1-2 TABLET ORAL EVERY 6 HOURS PRN
Qty: 30 TABLET | Refills: 0 | Status: SHIPPED | OUTPATIENT
Start: 2017-09-23 | End: 2017-09-28

## 2017-09-23 RX ORDER — VALACYCLOVIR HYDROCHLORIDE 1 G/1
1000 TABLET, FILM COATED ORAL 3 TIMES DAILY
Qty: 21 TABLET | Refills: 0 | Status: SHIPPED | OUTPATIENT
Start: 2017-09-23 | End: 2017-09-28

## 2017-09-23 NOTE — MR AVS SNAPSHOT
After Visit Summary   9/23/2017    Todd S Aschoff    MRN: [id number]           Patient Information     Date Of Birth          1955        Visit Information        Provider Department      9/23/2017 10:35 AM Soco Shepherd PA-C [facility]        Today's Diagnoses     Herpes zoster without complication    -  1       Follow-ups after your visit        Your next 10 appointments already scheduled     Oct 25, 2017  1:30 PM CDT   [facility]   [facility]    [address]  [facility] [id number] [phone]            Nov 01, 2017 10:40 AM CDT   (Arrive by 10:30 AM)   Return Visit with Geraldo Gonzales MD   [facility] ([facility])    [address]  [address]  [facility] [address] [phone]              Who to contact     If you have questions or need follow up information about today's clinic visit or your schedule please contact [facility] directly at [phone].  Normal or non-critical lab and imaging results will be communicated to you by SplashMapshart, letter or phone within 4 business days after the clinic has received the results. If you do not hear from us within 7 days, please contact the clinic through SplashMapshart or phone. If you have a critical or abnormal lab result, we will notify you by phone as soon as possible.  Submit refill requests through I-MD or call your pharmacy and they will forward the refill request to us. Please allow 3 business days for your refill to be completed.          Additional Information About Your Visit        MyChart Information     I-MD gives you secure access to your electronic health record. If you see a primary care provider, you can also send messages to your care team and make appointments. If you have questions, please call your primary care clinic.  If  you do not have a primary care provider, please call [phone] and they will assist you.        Care EveryWhere ID     This is your Care EveryWhere ID. This could be used by other organizations to access your [facility] medical records  [id number]        Your Vitals Were     Pulse Temperature Respirations Pulse Oximetry BMI (Body Mass Index)       66 98.9  F (37.2  C) (Oral) 12 97% 39.45 kg/m2        Blood Pressure from Last 3 Encounters:   09/28/17 131/82   09/23/17 124/70   06/27/17 116/70    Weight from Last 3 Encounters:   09/28/17 274 lb 4.8 oz (124.4 kg)   09/23/17 299 lb (135.6 kg)   06/27/17 297 lb (134.7 kg)              Today, you had the following     No orders found for display         Today's Medication Changes          These changes are accurate as of: 9/23/17 11:59 PM.  If you have any questions, ask your nurse or doctor.               Start taking these medicines.        Dose/Directions    HYDROcodone-acetaminophen 5-325 MG per tablet   Commonly known as:  NORCO   Used for:  Herpes zoster without complication   Started by:  Soco Shepherd PA-C        Dose:  1-2 tablet   Take 1-2 tablets by mouth every 6 hours as needed for moderate to severe pain Will not take his Valium while needing narcotic   Quantity:  30 tablet   Refills:  0       valACYclovir 1000 mg tablet   Commonly known as:  VALTREX   Used for:  Herpes zoster without complication   Started by:  Soco Shepherd PA-C        Dose:  1000 mg   Take 1 tablet (1,000 mg) by mouth 3 times daily   Quantity:  21 tablet   Refills:  0            Where to get your medicines      These medications were sent to [id number] - [place], [place] - [address]  [address] [facility] [address]     Phone:  [phone]     valACYclovir 1000 mg tablet         Some of these will need a paper prescription and others can be bought over the counter.  Ask your nurse if you have questions.     Bring a paper prescription for each of these  medications     HYDROcodone-acetaminophen 5-325 MG per tablet                Primary Care Provider Office Phone # Fax #    Obdulio Ingram [phone]       [address] [facility] [address]  [facility] [address]        Equal Access to Services     BIANKAANGELES ANTON : Hadii jimbo ku hademmao Soomaali, waaxda luqadaha, qaybta kaalmada adeegyada, sharonda farleyn margarito lebron larishabhalvaro hunt. So [facility] [phone].    ATENCIÓN: Si habla español, tiene a thomas disposición servicios gratuitos de asistencia lingüística. [facility] [phone].    We comply with applicable federal civil rights laws and [place] laws. We do not discriminate on the basis of race, color, national origin, age, disability, sex, sexual orientation, or gender identity.            Thank you!     Thank you for choosing [facility]  for your care. Our goal is always to provide you with excellent care. Hearing back from our patients is one way we can continue to improve our services. Please take a few minutes to complete the written survey that you may receive in the mail after your visit with us. Thank you!             Your Updated Medication List - Protect others around you: Learn how to safely use, store and throw away your medicines at www.disposemymeds.org.          This list is accurate as of: 9/23/17 11:59 PM.  Always use your most recent med list.                   Brand Name Dispense Instructions for use Diagnosis    * acetaminophen 650 MG 8 hour tablet    ACETAMINOPHEN 8 HOUR    250 tablet    Take 650 mg by mouth 2 times daily    Chronic low back pain       * MAPAP ARTHRITIS PAIN 650 MG CR tablet   Generic drug:  acetaminophen     180 tablet    TAKE 650 MG BY MOUTH 2 TIMES DAILY    Chronic low back pain       azelastine 0.1 % spray    ASTELIN    3 Bottle    Spray 2 sprays in nostril 2 times daily as needed    Chronic rhinitis       cetirizine 10 MG tablet    zyrTEC    30 tablet    TAKE ONE TABLET BY MOUTH ONE TIME DAILY AS NEEDED.     Chronic rhinitis       cyclobenzaprine 10 MG tablet    FLEXERIL    270 tablet    Take 1 tablet (10 mg) by mouth 3 times daily as needed for muscle spasms    Muscle spasm       diazepam 5 MG tablet    VALIUM    270 tablet    TAKE 1 TABLET BY MOUTH EVERY 8 HOURS AS NEEDED FOR ANXIETY OR MUSCLE SPASMS MAXIMUM #270 EVERY 90 DAYS.    Back muscle spasm       donepezil 10 MG tablet    ARICEPT    90 tablet    TAKE ONE TABLET BY MOUTH AT BEDTIME    Memory difficulties       enoxaparin 120 MG/0.8ML injection    LOVENOX    20 Syringe    Inject 0.9 mLs (135 mg) Subcutaneous every 12 hours    Aortic valve prosthesis present, Long term current use of anticoagulant therapy       guanFACINE 1 MG tablet    TENEX    90 tablet    Take 1 tablet (1 mg) by mouth At Bedtime    Major depressive disorder, recurrent episode, in full remission (H)       HYDROcodone-acetaminophen 5-325 MG per tablet    NORCO    30 tablet    Take 1-2 tablets by mouth every 6 hours as needed for moderate to severe pain Will not take his Valium while needing narcotic    Herpes zoster without complication       isometheptene-acetaminophen-dichloralphenazone -100 MG per capsule    MIDRIN    30 capsule    Take 1 capsule by mouth 4 times daily as needed    Headache(784.0)       levothyroxine 150 MCG tablet    SYNTHROID/LEVOTHROID    90 tablet    TAKE ONE TABLET BY MOUTH ONE TIME DAILY    Acquired hypothyroidism       mirabegron 50 MG 24 hr tablet    MYRBETRIQ    90 tablet    Take 1 tablet (50 mg) by mouth daily    Overactive bladder       pregabalin 100 MG capsule    LYRICA    270 capsule    Take 1 capsule (100 mg) by mouth 3 times daily Do not give if somnolent/sedated.    Chronic low back pain with sciatica, sciatica laterality unspecified, unspecified back pain laterality       propafenone 150 MG Tabs tablet    RYTHMOL    270 tablet    Take 1 tablet (150 mg) by mouth every 8 hours    Chronic atrial fibrillation (H)       senna-docusate 8.6-50 MG per tablet     SENOKOT-S;PERICOLACE    60 tablet    Take 1 tablet by mouth 2 times daily as needed for constipation    Constipation, unspecified constipation type       simvastatin 40 MG tablet    ZOCOR    90 tablet    Take 1 tablet (40 mg) by mouth At Bedtime    Hyperlipidemia LDL goal <130       * traZODone 100 MG tablet    DESYREL    90 tablet    Take 1 tablet (100 mg) by mouth nightly as needed for sleep    Insomnia, unspecified insomnia       * traZODone 50 MG tablet    DESYREL          valACYclovir 1000 mg tablet    VALTREX    21 tablet    Take 1 tablet (1,000 mg) by mouth 3 times daily    Herpes zoster without complication       venlafaxine 150 MG Tb24 24 hr tablet    EFFEXOR-ER    90 each    Take 1 tablet (150 mg) by mouth daily (with breakfast)    Major depressive disorder, recurrent episode, in full remission (H)       warfarin 5 MG tablet    COUMADIN    100 tablet    take 1 tablet ( 5mg) daily except 1 and 1/2 tablet (7.5mg) on Wednesdays and Saturdays  or as intructed by INR clinic    Current use of long term anticoagulation       * Notice:  This list has 4 medication(s) that are the same as other medications prescribed for you. Read the directions carefully, and ask your doctor or other care provider to review them with you.

## 2017-09-23 NOTE — NURSING NOTE
"Chief Complaint   Patient presents with     Urgent Care     Shingles     Pt thinks he has shingles on his back and left upper thigh.  He says it kept him from sleeping last night, pretty painful.       Initial /70 (BP Location: Right arm, Patient Position: Sitting, Cuff Size: Adult Regular)  Pulse 66  Temp 98.9  F (37.2  C) (Oral)  Resp 12  Wt 299 lb (135.6 kg)  SpO2 97%  BMI 39.45 kg/m2 Estimated body mass index is 39.45 kg/(m^2) as calculated from the following:    Height as of 6/27/17: 6' 1\" (1.854 m).    Weight as of this encounter: 299 lb (135.6 kg)..  BP completed using cuff size: regular  Brenda Quinteros R.N.    "

## 2017-09-27 ENCOUNTER — TELEPHONE (OUTPATIENT)
Dept: INTERNAL MEDICINE | Facility: CLINIC | Age: 62
End: 2017-09-27

## 2017-09-27 NOTE — TELEPHONE ENCOUNTER
Todd S Aschoff is a 61 year old male who calls with Shingles. He was diagnosed in Urgent Care on 9/23/17 and started on Valacyclovir. Rash has continue to spread down his leg and he is having increased pain with this. His wife tells him a couple of the blisters are approximately the size of a quarter and dark in color, she is concerned he may be developing an infection. No drainage from blisters or fever.    NURSING ASSESSMENT:  Description:  Shingle rash low back and left leg  Onset/duration:  6 days  Precip. factors:  n/a  Associated symptoms:  Pain, redness, rash spreading  Improves/worsens symptoms:  Pain medications    Allergies:   Allergies   Allergen Reactions     No Known Allergies          NURSING PLAN: Nursing advice to patient office visit for further evaluation    RECOMMENDED DISPOSITION:  See in 24 hours - appt scheduled with Dr. Morse on 9/28/17  Will comply with recommendation: Yes  If further questions/concerns or if symptoms do not improve, worsen or new symptoms develop, call your PCP or [facility] as soon as possible.      Guideline used:  EPIC Clinical References -   [id number] [employer]      Pam Cruz RN

## 2017-09-28 ENCOUNTER — OFFICE VISIT (OUTPATIENT)
Dept: INTERNAL MEDICINE | Facility: CLINIC | Age: 62
End: 2017-09-28
Payer: COMMERCIAL

## 2017-09-28 VITALS
DIASTOLIC BLOOD PRESSURE: 82 MMHG | OXYGEN SATURATION: 100 % | HEART RATE: 93 BPM | BODY MASS INDEX: 36.35 KG/M2 | TEMPERATURE: 97.6 F | WEIGHT: 274.3 LBS | SYSTOLIC BLOOD PRESSURE: 131 MMHG | HEIGHT: 73 IN

## 2017-09-28 DIAGNOSIS — R21 RASH: Primary | ICD-10-CM

## 2017-09-28 PROCEDURE — 36415 COLL VENOUS BLD VENIPUNCTURE: CPT | Performed by: FAMILY MEDICINE

## 2017-09-28 PROCEDURE — 87798 DETECT AGENT NOS DNA AMP: CPT | Performed by: FAMILY MEDICINE

## 2017-09-28 PROCEDURE — 99214 OFFICE O/P EST MOD 30 MIN: CPT | Performed by: FAMILY MEDICINE

## 2017-09-28 RX ORDER — HYDROCODONE BITARTRATE AND ACETAMINOPHEN 5; 325 MG/1; MG/1
1-2 TABLET ORAL EVERY 6 HOURS PRN
Qty: 30 TABLET | Refills: 0 | Status: SHIPPED | OUTPATIENT
Start: 2017-09-28 | End: 2017-11-01

## 2017-09-28 RX ORDER — VALACYCLOVIR HYDROCHLORIDE 1 G/1
1000 TABLET, FILM COATED ORAL 3 TIMES DAILY
Qty: 21 TABLET | Refills: 0 | Status: SHIPPED | OUTPATIENT
Start: 2017-09-28 | End: 2018-02-09

## 2017-09-28 NOTE — PROGRESS NOTES
"  SUBJECTIVE:   Todd S Aschoff is a 61 year old male who presents to clinic today for the following health issues:      ED/UC Followup:    Facility:  [facility] in [place]  Date of visit: 9/23/17  Reason for visit: Shingles  Current Status: Shingles keep spreading and still has pain     Need refill on pain medication.    Patient was dx with shingles recently and here in followup.   The rash seems to be worsening and he has pain that is helped by narcotics.   The skin feels sensitive and the L inner thigh is now affected.   It has been present for one week and he has been taking valtrex for 5 days.   No fever but feels malaise.   Has comorbidites of thyroid disease, chronic lumbar radiculopathy and hx of mechanical AVR.  No urinary sx and no hx of zostavax.    ROS:  General, neuro, sleep, psych, musculoskeletal system otherwise negative.    /82 (BP Location: Left arm, Patient Position: Sitting, Cuff Size: Adult Large)  Pulse 93  Temp 97.6  F (36.4  C) (Oral)  Ht 6' 1\" (1.854 m)  Wt 274 lb 4.8 oz (124.4 kg)  SpO2 100%  BMI 36.19 kg/m2    Obese WM in mild distress  Numerous inflammatory papules on L buttock, outer thigh.  No sign of secondary infection.   Non tender/or mildly tender.    ASSESSMENT:  1. Rash  Pinprick and swabbed three lesions to confirm VZV but minimal fluid present.    Stay on valtrex   Pain med renewed.   Discussed vasculitis but doubt that dx     - Varicella Zoster DNA PCR CSF or Skin Swab    Greater than 25 minutes spent with patient and family discussing risks and benefits and management with possible outcomes regarding this issue with greater than 50% in counseling for medical decision making and coordination of care.        "

## 2017-09-28 NOTE — NURSING NOTE
"Chief Complaint   Patient presents with     Shingles     Shingles on legs and back. Was seen at [facility] in [place] 9/23/17.       Initial /82 (BP Location: Left arm, Patient Position: Sitting, Cuff Size: Adult Large)  Pulse 93  Temp 97.6  F (36.4  C) (Oral)  Ht 6' 1\" (1.854 m)  Wt 274 lb 4.8 oz (124.4 kg)  SpO2 100%  BMI 36.19 kg/m2 Estimated body mass index is 36.19 kg/(m^2) as calculated from the following:    Height as of this encounter: 6' 1\" (1.854 m).    Weight as of this encounter: 274 lb 4.8 oz (124.4 kg).  Medication Reconciliation: complete   Crystal Duval MA        "

## 2017-09-29 LAB
SPECIMEN TYPE: ABNORMAL
VARICELLA ZOSTER DNA PCR COMMENT: ABNORMAL
VZV DNA SPEC QL NAA+PROBE: ABNORMAL

## 2017-10-02 ENCOUNTER — TELEPHONE (OUTPATIENT)
Dept: INTERNAL MEDICINE | Facility: CLINIC | Age: 62
End: 2017-10-02

## 2017-10-11 ENCOUNTER — ANTICOAGULATION THERAPY VISIT (OUTPATIENT)
Dept: ANTICOAGULATION | Facility: CLINIC | Age: 62
End: 2017-10-11
Payer: COMMERCIAL

## 2017-10-11 DIAGNOSIS — I48.91 ATRIAL FIBRILLATION, UNSPECIFIED TYPE (H): ICD-10-CM

## 2017-10-11 DIAGNOSIS — Z95.2 AORTIC VALVE PROSTHESIS PRESENT: ICD-10-CM

## 2017-10-11 DIAGNOSIS — Z79.01 LONG-TERM (CURRENT) USE OF ANTICOAGULANTS: ICD-10-CM

## 2017-10-11 LAB — INR POINT OF CARE: 2.2 (ref 0.86–1.14)

## 2017-10-11 PROCEDURE — 99207 ZZC NO CHARGE NURSE ONLY: CPT

## 2017-10-11 PROCEDURE — 36416 COLLJ CAPILLARY BLOOD SPEC: CPT

## 2017-10-11 PROCEDURE — 85610 PROTHROMBIN TIME: CPT | Mod: QW

## 2017-10-11 NOTE — MR AVS SNAPSHOT
Todd S Aschoff   10/11/2017 1:30 PM   Anticoagulation Therapy Visit    Description:  61 year old male   Provider[facility]   Department:  [facility]           INR as of 10/11/2017     Today's INR 2.2!      Anticoagulation Summary as of 10/11/2017     INR goal 2.5-3.5   Today's INR 2.2!   Full instructions 10/11: 7.5 mg; 10/12: 7.5 mg; Otherwise 5 mg every day   Next INR check 10/25/2017    Indications   Long-term (current) use of anticoagulants [Z79.01] [Z79.01]  Aortic valve prosthesis present [Z95.2]  Atrial fibrillation (H) [I48.91]         Your next Anticoagulation Clinic appointment(s)     Oct 25, 2017  1:30 PM CDT   [facility] with [facility]   [facility]    [address] [facility] [address]  [facility] [address] [phone]              Contact Numbers     [facility] Phone Numbers:  Anticoagulation Clinic Appointments : [phone]  Anticoagulation Nurse: [phone]         October 2017 Details    Sun Mon Tue Wed Thu Fri Sat     1               2               3               4               5               6               7                 8               9               10               11      7.5 mg   See details      12      7.5 mg         13      5 mg         14      5 mg           15      5 mg         16      5 mg         17      5 mg         18      5 mg         19      5 mg         20      5 mg         21      5 mg           22      5 mg         23      5 mg         24      5 mg         25            26               27               28                 29               30               31                    Date Details   10/11 This INR check       Date of next INR:  10/25/2017         How to take your warfarin dose     To take:  5 mg Take 1 of the 5 mg tablets.    To take:  7.5 mg Take 1.5 of the 5 mg tablets.

## 2017-10-13 NOTE — PROGRESS NOTES
SUBJECTIVE:  Todd S Aschoff is a 61 year old male who presents to the clinic today for a rash.  Onset of rash was 2 day(s) ago.   Rash is gradual onset and still present.  Location of the rash: left upper thigh and around to back on the left.  Quality/symptoms of rash: burning and painful   Symptoms are moderate and rash seems to be worsening.  Previous history of a similar rash? No  Recent exposure history: none known    Associated symptoms include: nothing.    Past Medical History:   Diagnosis Date     Alcohol dependence (H)      Allergy, unspecified not elsewhere classified     seasonal     Antiplatelet or antithrombotic long-term use     coumadin/lovenox     Aortic valve prosthesis present      Atrial fibrillation (H)      Blood transfusion     after heart surg 1992     BPH (benign prostatic hypertrophy)      Chronic infection     low back wound incision not healing      Chronic pain     lower back and right leg and left leg     Coagulation disorder (H)     on blood thinners     Dissection of aorta, thoracic (H) 1992    [facility] aotic valve + arch graft 1992     Headache(784.0)      Heart murmur     aortic valve replaced and arch     Low back pain      Major depression      Mixed hyperlipidemia      Numbness and tingling     right leg post surg rightt hip/ also left leg since surg     OA (osteoarthritis)     hips     Obesity, unspecified      Sciatica 2002    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     Unspecified hypothyroidism      Current Outpatient Prescriptions   Medication Sig Dispense Refill     HYDROcodone-acetaminophen (NORCO) 5-325 MG per tablet Take 1-2 tablets by mouth every 6 hours as needed for moderate to severe pain Will not take his Valium while needing narcotic 30 tablet 0     valACYclovir (VALTREX) 1000 mg tablet Take 1 tablet (1,000 mg) by mouth 3 times daily 21 tablet 0     donepezil (ARICEPT) 10 MG tablet TAKE ONE TABLET BY MOUTH AT BEDTIME  90 tablet 0     diazepam (VALIUM) 5 MG tablet  TAKE 1 TABLET BY MOUTH EVERY 8 HOURS AS NEEDED FOR ANXIETY OR MUSCLE SPASMS MAXIMUM #270 EVERY 90 DAYS. 270 tablet 0     traZODone (DESYREL) 50 MG tablet   2     cetirizine (ZYRTEC) 10 MG tablet TAKE ONE TABLET BY MOUTH ONE TIME DAILY AS NEEDED. 30 tablet 6     MAPAP ARTHRITIS PAIN 650 MG CR tablet TAKE 650 MG BY MOUTH 2 TIMES DAILY 180 tablet 2     levothyroxine (SYNTHROID/LEVOTHROID) 150 MCG tablet TAKE ONE TABLET BY MOUTH ONE TIME DAILY  90 tablet 3     warfarin (COUMADIN) 5 MG tablet take 1 tablet ( 5mg) daily except 1 and 1/2 tablet (7.5mg) on Wednesdays and Saturdays  or as intructed by INR clinic 100 tablet 1     propafenone (RYTHMOL) 150 MG TABS tablet Take 1 tablet (150 mg) by mouth every 8 hours 270 tablet 3     pregabalin (LYRICA) 100 MG capsule Take 1 capsule (100 mg) by mouth 3 times daily Do not give if somnolent/sedated. 270 capsule 3     cyclobenzaprine (FLEXERIL) 10 MG tablet Take 1 tablet (10 mg) by mouth 3 times daily as needed for muscle spasms 270 tablet 3     simvastatin (ZOCOR) 40 MG tablet Take 1 tablet (40 mg) by mouth At Bedtime 90 tablet 3     azelastine (ASTELIN) 0.1 % spray Spray 2 sprays in nostril 2 times daily as needed 3 Bottle 3     mirabegron (MYRBETRIQ) 50 MG 24 hr tablet Take 1 tablet (50 mg) by mouth daily 90 tablet 1     enoxaparin (ENOXAPARIN) 120 MG/0.8ML injection Inject 0.9 mLs (135 mg) Subcutaneous every 12 hours 20 Syringe 1     isometheptene-acetaminophen-dichloralphenazone (MIDRIN) -100 MG per capsule Take 1 capsule by mouth 4 times daily as needed 30 capsule 0     senna-docusate (SENOKOT-S;PERICOLACE) 8.6-50 MG per tablet Take 1 tablet by mouth 2 times daily as needed for constipation 60 tablet 11     traZODone (DESYREL) 100 MG tablet Take 1 tablet (100 mg) by mouth nightly as needed for sleep 90 tablet 3     venlafaxine (EFFEXOR-ER) 150 MG TB24 Take 1 tablet (150 mg) by mouth daily (with breakfast) 90 each 3     guanFACINE (TENEX) 1 MG tablet Take 1 tablet (1  mg) by mouth At Bedtime 90 tablet 3     Acetaminophen (ACETAMINOPHEN 8 HOUR) 650 MG TABS Take 650 mg by mouth 2 times daily 250 tablet 3     Social History   Substance Use Topics     Smoking status: Never Smoker     Smokeless tobacco: Never Used     Alcohol use No      Comment: Stopped drinking alcohol ~2009       ROS:  Review of systems negative except as stated above.    EXAM:   /70 (BP Location: Right arm, Patient Position: Sitting, Cuff Size: Adult Regular)  Pulse 66  Temp 98.9  F (37.2  C) (Oral)  Resp 12  Wt 299 lb (135.6 kg)  SpO2 97%  BMI 39.45 kg/m2  GENERAL: alert, no acute distress.  SKIN: Rash description:    Distribution: dermatomal  Location: left upper thigh around to back in L3 nerve root distribution with cluster of vesicles on erythematous base consistent with shingles  GENERAL APPEARANCE: healthy, alert and no distress  EYES: EOMI,  PERRL, conjunctiva clear  NECK: supple, non-tender to palpation, no adenopathy noted  RESP: lungs clear to auscultation - no rales, rhonchi or wheezes  CV: regular rates and rhythm, normal S1 S2, no murmur noted    assessment/plan:  (B02.9) Herpes zoster without complication  (primary encounter diagnosis)  Comment: left L3   Plan: : valACYclovir (VALTREX) 1000 mg         tablet, : HYDROcodone-acetaminophen        (NORCO) 5-325 MG per tablet          Nature of shingle discussed, typical course and possible complications and transmission.  Supportive care and to FU with PCP as needed if sx worsen or new sx develop.  Patient with underlying cardia a fib and on coumadin.  INR in therapeutic range.

## 2017-10-19 DIAGNOSIS — M62.830 BACK MUSCLE SPASM: ICD-10-CM

## 2017-10-19 RX ORDER — DIAZEPAM 5 MG
TABLET ORAL
Qty: 270 TABLET | Refills: 0 | Status: SHIPPED | OUTPATIENT
Start: 2017-10-19 | End: 2017-12-26

## 2017-10-25 ENCOUNTER — ANTICOAGULATION THERAPY VISIT (OUTPATIENT)
Dept: ANTICOAGULATION | Facility: CLINIC | Age: 62
End: 2017-10-25
Payer: COMMERCIAL

## 2017-10-25 DIAGNOSIS — Z79.01 LONG-TERM (CURRENT) USE OF ANTICOAGULANTS: ICD-10-CM

## 2017-10-25 DIAGNOSIS — I48.91 ATRIAL FIBRILLATION, UNSPECIFIED TYPE (H): ICD-10-CM

## 2017-10-25 DIAGNOSIS — Z95.2 AORTIC VALVE PROSTHESIS PRESENT: ICD-10-CM

## 2017-10-25 LAB — INR POINT OF CARE: 3.3 (ref 0.86–1.14)

## 2017-10-25 PROCEDURE — 36416 COLLJ CAPILLARY BLOOD SPEC: CPT

## 2017-10-25 PROCEDURE — 99207 ZZC NO CHARGE NURSE ONLY: CPT

## 2017-10-25 PROCEDURE — 85610 PROTHROMBIN TIME: CPT | Mod: QW

## 2017-10-25 NOTE — PROGRESS NOTES
ANTICOAGULATION FOLLOW-UP CLINIC VISIT    Patient Name:  Todd S Aschoff  Date:  10/25/2017  Contact Type:  Face to Face    SUBJECTIVE:     Patient Findings     Positives No Problem Findings           OBJECTIVE    INR Protime   Date Value Ref Range Status   10/25/2017 3.3 (A) 0.86 - 1.14 Final       ASSESSMENT / PLAN  INR assessment THER    Recheck INR In: 4 WEEKS    INR Location Clinic      Anticoagulation Summary as of 10/25/2017     INR goal 2.5-3.5   Today's INR 3.3   Maintenance plan 5 mg (5 mg x 1) every day   Full instructions 5 mg every day   Weekly total 35 mg   No change documented Amanda Wheeler RN   Plan last modified Amanda Wheeler RN (7/19/2017)   Next INR check 11/22/2017   Priority INR   Target end date     Indications   Long-term (current) use of anticoagulants [Z79.01] [Z79.01]  Aortic valve prosthesis present [Z95.2]  Atrial fibrillation (H) [I48.91]         Anticoagulation Episode Summary     INR check location     Preferred lab     Send INR reminders to [facility]    Comments       Anticoagulation Care Providers     Provider Role Specialty Phone number    Obdulio Ingram MD [facility] Internal Medicine [phone]            See the Encounter Report to view Anticoagulation Flowsheet and Dosing Calendar (Go to Encounters tab in chart review, and find the Anticoagulation Therapy Visit)    Dosage adjustment made based on physician directed care plan.    Amanda Wheeler RN

## 2017-10-25 NOTE — MR AVS SNAPSHOT
Todd S Aschoff   10/25/2017 1:30 PM   Anticoagulation Therapy Visit    Description:  61 year old male   Provider:  [facility]   Department:  [facility]           INR as of 10/25/2017     Today's INR 3.3      Anticoagulation Summary as of 10/25/2017     INR goal 2.5-3.5   Today's INR 3.3   Full instructions 5 mg every day   Next INR check 11/22/2017    Indications   Long-term (current) use of anticoagulants [Z79.01] [Z79.01]  Aortic valve prosthesis present [Z95.2]  Atrial fibrillation (H) [I48.91]         Your next Anticoagulation Clinic appointment(s)     Oct 25, 2017  1:30 PM CDT   [facility]ticoagulation Visit with [facility]   [facility] ([facility])    [address] [facility] [address]  [facility] [address] [phone]            Nov 22, 2017  1:30 PM CST   Anticoagulation Visit with [facility]   [facility] ([facility])    [address]  [facility] [address] [phone]              Contact Numbers     [facility] Phone Numbers:  Anticoagulation Clinic Appointments : [phone]  Anticoagulation Nurse: [phone]         October 2017 Details    Sun Mon Tue Wed Thu Fri Sat     1               2               3               4               5               6               7                 8               9               10               11               12               13               14                 15               16               17               18               19               20               21                 22               23               24               25      5 mg   See details      26      5 mg         27      5 mg         28      5 mg           29      5 mg         30      5 mg         31      5 mg              Date Details   10/25 This INR check               How to take your warfarin dose     To take:  5 mg Take 1 of the 5 mg tablets.            November 2017 Details    Sun Mon Tue Wed Thu Fri Sat        1      5 mg         2      5 mg         3      5 mg         4      5 mg           5      5 mg         6      5 mg         7      5 mg         8      5 mg         9      5 mg         10      5 mg         11      5 mg           12      5 mg         13      5 mg         14      5 mg         15      5 mg         16      5 mg         17      5 mg         18      5 mg           19      5 mg         20      5 mg         21      5 mg         22            23               24               25                 26               27               28               29               30                  Date Details   No additional details    Date of next INR:  11/22/2017         How to take your warfarin dose     To take:  5 mg Take 1 of the 5 mg tablets.

## 2017-10-26 ENCOUNTER — TELEPHONE (OUTPATIENT)
Dept: INTERNAL MEDICINE | Facility: CLINIC | Age: 62
End: 2017-10-26

## 2017-10-26 NOTE — TELEPHONE ENCOUNTER
Pt was on our schedule for nurse-only shingles vaccine. After speaking with Dr. Morse and consulting the recommended guidelines, pt was informed that it would actually be contraindicated for him to have the shingles vaccine at this time (he was last seen in our office 9/28/17 for shingles infection). Pt was advised that he would need to wait until 6-12 months after thang the shingles virus before he would be able to get the vaccine. Pt was ok with plan.    Julep message sent to pt also advising him that he should check with his insurance regarding coverage when he does come back for this.

## 2017-11-01 ENCOUNTER — OFFICE VISIT (OUTPATIENT)
Dept: UROLOGY | Facility: CLINIC | Age: 62
End: 2017-11-01
Payer: COMMERCIAL

## 2017-11-01 VITALS
DIASTOLIC BLOOD PRESSURE: 70 MMHG | SYSTOLIC BLOOD PRESSURE: 126 MMHG | BODY MASS INDEX: 36.31 KG/M2 | HEART RATE: 74 BPM | WEIGHT: 274 LBS | HEIGHT: 73 IN

## 2017-11-01 DIAGNOSIS — N41.1 PROSTATITIS, CHRONIC: Primary | ICD-10-CM

## 2017-11-01 DIAGNOSIS — R97.20 ELEVATED PROSTATE SPECIFIC ANTIGEN (PSA): ICD-10-CM

## 2017-11-01 DIAGNOSIS — R97.20 ELEVATED PROSTATE SPECIFIC ANTIGEN (PSA): Primary | ICD-10-CM

## 2017-11-01 LAB
ALBUMIN UR-MCNC: NEGATIVE MG/DL
APPEARANCE UR: CLEAR
BILIRUB UR QL STRIP: NEGATIVE
COLOR UR AUTO: YELLOW
GLUCOSE UR STRIP-MCNC: NEGATIVE MG/DL
HGB UR QL STRIP: NEGATIVE
KETONES UR STRIP-MCNC: NEGATIVE MG/DL
LEUKOCYTE ESTERASE UR QL STRIP: NEGATIVE
NITRATE UR QL: NEGATIVE
PH UR STRIP: 5 PH (ref 5–7)
PSA SERPL-MCNC: 3.8 NG/ML (ref 0–4)
SOURCE: NORMAL
SP GR UR STRIP: >1.03 (ref 1–1.03)
UROBILINOGEN UR STRIP-ACNC: 0.2 EU/DL (ref 0.2–1)

## 2017-11-01 PROCEDURE — 99212 OFFICE O/P EST SF 10 MIN: CPT | Performed by: UROLOGY

## 2017-11-01 PROCEDURE — 36415 COLL VENOUS BLD VENIPUNCTURE: CPT | Performed by: UROLOGY

## 2017-11-01 PROCEDURE — 84153 ASSAY OF PSA TOTAL: CPT | Performed by: UROLOGY

## 2017-11-01 PROCEDURE — 81003 URINALYSIS AUTO W/O SCOPE: CPT | Performed by: UROLOGY

## 2017-11-01 PROCEDURE — 87070 CULTURE OTHR SPECIMN AEROBIC: CPT | Performed by: UROLOGY

## 2017-11-01 ASSESSMENT — PAIN SCALES - GENERAL: PAINLEVEL: NO PAIN (0)

## 2017-11-01 NOTE — NURSING NOTE
Chief Complaint   Patient presents with     Clinic Care Coordination - Follow-up     Elevated PSA      Millie Andrew LPN

## 2017-11-01 NOTE — PROGRESS NOTES
Todd Aschoff is a pleasant 61-year-old male with a family history of prostate cancer, prostatitis and cystitis earlier in the year. He also has an overactive bladder. His PSA is down to 3.8 today. He had an MRI scan of the prostate 3 months ago that was equivocal-a possible focus of abnormality left peripheral zone. Several years ago he had some atypia in the right mid gland but no malignancy.  He has had normal digital rectal exams.  Review of systems: No dysuria or hematuria  Other past medical history: History of alcohol dependence, aortic valve prosthesis, atrial fibrillation, BPH, coagulation disorder, aortic dissection (thoracic), headaches, heart murmur, history of spinal cord injury, chronic low back pain, major depression, hyperlipidemia, osteoarthritis, obesity, sciatica, hypothyroidism, nonsmoker  Family history: Father with prostate cancer  Medications: Tylenol, Astelin spray, Zyrtec, Flexeril, Valium, Aricept, Tenex, Midrin, Synthroid, arthritis pain formula, myrbetriq, Lyrica, Rythmol, Senokot, simvastatin, trazodone, Valtrex, effexor, Coumadin  Allergies: None  Exam: Normal appearance other than abdominal obesity. Vital signs normal. Alert and oriented, normocephalic, normal respirations, neuro grossly intact. Normal sphincter tone, no rectal mass or impaction, prostate is indurated left base and mid gland-more medial than lateral  Normal seminal vesicles  Urinalysis: Normal  Assessment: History of prostatitis/cystitis in 2017, family history of prostate cancer, PSA decreasing and patient feels well. With abnormal digital rectal exam he needs a semen culture. If semen culture is negative he needs biopsies of his prostate to rule out carcinoma-all discussed

## 2017-11-01 NOTE — LETTER
11/1/2017       RE: Todd S Aschoff  [address]  [place] [address]     Dear Colleague,    Thank you for referring your patient, Todd S Aschoff, to the [facility] at [facility]. Please see a copy of my visit note below.    Todd Aschoff is a pleasant 61-year-old male with a family history of prostate cancer, prostatitis and cystitis earlier in the year. He also has an overactive bladder. His PSA is down to 3.8 today. He had an MRI scan of the prostate 3 months ago that was equivocal-a possible focus of abnormality left peripheral zone. Several years ago he had some atypia in the right mid gland but no malignancy.  He has had normal digital rectal exams.  Review of systems: No dysuria or hematuria  Other past medical history: History of alcohol dependence, aortic valve prosthesis, atrial fibrillation, BPH, coagulation disorder, aortic dissection (thoracic), headaches, heart murmur, history of spinal cord injury, chronic low back pain, major depression, hyperlipidemia, osteoarthritis, obesity, sciatica, hypothyroidism, nonsmoker  Family history: Father with prostate cancer  Medications: Tylenol, Astelin spray, Zyrtec, Flexeril, Valium, Aricept, Tenex, Midrin, Synthroid, arthritis pain formula, myrbetriq, Lyrica, Rythmol, Senokot, simvastatin, trazodone, Valtrex, effexor, Coumadin  Allergies: None  Exam: Normal appearance other than abdominal obesity. Vital signs normal. Alert and oriented, normocephalic, normal respirations, neuro grossly intact. Normal sphincter tone, no rectal mass or impaction, prostate is indurated left base and mid gland-more medial than lateral  Normal seminal vesicles  Urinalysis: Normal  Assessment: History of prostatitis/cystitis in 2017, family history of prostate cancer, PSA decreasing and patient feels well. With abnormal digital rectal exam he needs a semen culture. If semen culture is negative he needs  biopsies of his prostate to rule out carcinoma-all discussed      Again, thank you for allowing me to participate in the care of your patient.      Sincerely,    Geraldo Gonzales MD

## 2017-11-01 NOTE — MR AVS SNAPSHOT
After Visit Summary   11/1/2017    Todd S Aschoff    MRN: [id number]           Patient Information     Date Of Birth          1955        Visit Information        Provider Department      11/1/2017 10:40 AM Geraldo Gonzales MD [facility] [place]        Today's Diagnoses     Prostatitis, chronic    -  1    Elevated prostate specific antigen (PSA)           Follow-ups after your visit        Your next 10 appointments already scheduled     Nov 22, 2017  1:30 PM CST   Anticoagulation Visit with [facility]   [facility] ([facility])    [address] [place] [address]  [facility] [id number] [phone]              Future tests that were ordered for you today     Open Future Orders        Priority Expected Expires Ordered    Genital Culture Aerobic Bacterial [APR676] Routine  11/22/2017 11/1/2017            Who to contact     If you have questions or need follow up information about today's clinic visit or your schedule please contact [facility] [place] directly at [phone].  Normal or non-critical lab and imaging results will be communicated to you by LocalViewhart, letter or phone within 4 business days after the clinic has received the results. If you do not hear from us within 7 days, please contact the clinic through LocalViewhart or phone. If you have a critical or abnormal lab result, we will notify you by phone as soon as possible.  Submit refill requests through AllPeers or call your pharmacy and they will forward the refill request to us. Please allow 3 business days for your refill to be completed.          Additional Information About Your Visit        MyChart Information     AllPeers gives you secure access to your electronic health record. If you see a primary care provider, you can also send messages to your care team and make appointments. If you have questions, please call your  "primary care clinic.  If you do not have a primary care provider, please call [phone] and they will assist you.        Care EveryWhere ID     This is your Care EveryWhere ID. This could be used by other organizations to access your [facility] medical records  [id number]        Your Vitals Were     Pulse Height BMI (Body Mass Index)             74 1.854 m (6' 1\") 36.15 kg/m2          Blood Pressure from Last 3 Encounters:   11/01/17 126/70   09/28/17 131/82   09/23/17 124/70    Weight from Last 3 Encounters:   11/01/17 124.3 kg (274 lb)   09/28/17 124.4 kg (274 lb 4.8 oz)   09/23/17 135.6 kg (299 lb)              We Performed the Following     PSA Diag Urologic Phys     UA without Microscopic        Primary Care Provider Office Phone # Fax #    Obdulio Ingram [phone]       [address] [place] [address]        Equal Access to Services     [facility] : Hadii aad ku hadasho Soomaali, waaxda luqadaha, qaybta kaalmada adeegyada, waxay idiin hayaan margarito liebermanaradarian molina . So [facility] [phone].    ATENCIÓN: Si habla español, tiene a thomas disposición servicios gratuitos de asistencia lingüística. [facility] [phone].    We comply with applicable federal civil rights laws and [place] laws. We do not discriminate on the basis of race, color, national origin, age, disability, sex, sexual orientation, or gender identity.            Thank you!     Thank you for choosing [facility] [place]  for your care. Our goal is always to provide you with excellent care. Hearing back from our patients is one way we can continue to improve our services. Please take a few minutes to complete the written survey that you may receive in the mail after your visit with us. Thank you!             Your Updated Medication List - Protect others around you: Learn how to safely use, store and throw away your medicines at www.disposemymeds.org.          This list is accurate as " of: 11/1/17 11:29 AM.  Always use your most recent med list.                   Brand Name Dispense Instructions for use Diagnosis    * acetaminophen 650 MG 8 hour tablet    ACETAMINOPHEN 8 HOUR    250 tablet    Take 650 mg by mouth 2 times daily    Chronic low back pain       * MAPAP ARTHRITIS PAIN 650 MG CR tablet   Generic drug:  acetaminophen     180 tablet    TAKE 650 MG BY MOUTH 2 TIMES DAILY    Chronic low back pain       azelastine 0.1 % spray    ASTELIN    3 Bottle    Spray 2 sprays in nostril 2 times daily as needed    Chronic rhinitis       cetirizine 10 MG tablet    zyrTEC    30 tablet    TAKE ONE TABLET BY MOUTH ONE TIME DAILY AS NEEDED.    Chronic rhinitis       cyclobenzaprine 10 MG tablet    FLEXERIL    270 tablet    Take 1 tablet (10 mg) by mouth 3 times daily as needed for muscle spasms    Muscle spasm       diazepam 5 MG tablet    VALIUM    270 tablet    TAKE 1 TABLET BY MOUTH EVERY 8 HOURS AS NEEDED FOR ANXIETY OR MUSCLE SPASMS MAXIMUM #270 EVERY 90 DAYS.    Back muscle spasm       donepezil 10 MG tablet    ARICEPT    90 tablet    TAKE ONE TABLET BY MOUTH AT BEDTIME    Memory difficulties       enoxaparin 120 MG/0.8ML injection    LOVENOX    20 Syringe    Inject 0.9 mLs (135 mg) Subcutaneous every 12 hours    Aortic valve prosthesis present, Long term current use of anticoagulant therapy       guanFACINE 1 MG tablet    TENEX    90 tablet    Take 1 tablet (1 mg) by mouth At Bedtime    Major depressive disorder, recurrent episode, in full remission (H)       isometheptene-acetaminophen-dichloralphenazone -100 MG per capsule    MIDRIN    30 capsule    Take 1 capsule by mouth 4 times daily as needed    Headache(784.0)       levothyroxine 150 MCG tablet    SYNTHROID/LEVOTHROID    90 tablet    TAKE ONE TABLET BY MOUTH ONE TIME DAILY    Acquired hypothyroidism       mirabegron 50 MG 24 hr tablet    MYRBETRIQ    90 tablet    Take 1 tablet (50 mg) by mouth daily    Overactive bladder       pregabalin  100 MG capsule    LYRICA    270 capsule    Take 1 capsule (100 mg) by mouth 3 times daily Do not give if somnolent/sedated.    Chronic low back pain with sciatica, sciatica laterality unspecified, unspecified back pain laterality       propafenone 150 MG Tabs tablet    RYTHMOL    270 tablet    Take 1 tablet (150 mg) by mouth every 8 hours    Chronic atrial fibrillation (H)       senna-docusate 8.6-50 MG per tablet    SENOKOT-S;PERICOLACE    60 tablet    Take 1 tablet by mouth 2 times daily as needed for constipation    Constipation, unspecified constipation type       simvastatin 40 MG tablet    ZOCOR    90 tablet    Take 1 tablet (40 mg) by mouth At Bedtime    Hyperlipidemia LDL goal <130       traZODone 50 MG tablet    DESYREL          valACYclovir 1000 mg tablet    VALTREX    21 tablet    Take 1 tablet (1,000 mg) by mouth 3 times daily        venlafaxine 150 MG Tb24 24 hr tablet    EFFEXOR-ER    90 each    Take 1 tablet (150 mg) by mouth daily (with breakfast)    Major depressive disorder, recurrent episode, in full remission (H)       warfarin 5 MG tablet    COUMADIN    100 tablet    take 1 tablet ( 5mg) daily except 1 and 1/2 tablet (7.5mg) on Wednesdays and Saturdays  or as intructed by INR clinic    Current use of long term anticoagulation       * Notice:  This list has 2 medication(s) that are the same as other medications prescribed for you. Read the directions carefully, and ask your doctor or other care provider to review them with you.

## 2017-11-02 DIAGNOSIS — N32.81 OVERACTIVE BLADDER: ICD-10-CM

## 2017-11-02 DIAGNOSIS — N41.1 PROSTATITIS, CHRONIC: ICD-10-CM

## 2017-11-02 RX ORDER — MIRABEGRON 50 MG/1
50 TABLET, EXTENDED RELEASE ORAL DAILY
Qty: 90 TABLET | Refills: 3 | Status: SHIPPED | OUTPATIENT
Start: 2017-11-02 | End: 2018-08-02

## 2017-11-04 LAB
BACTERIA SPEC CULT: NORMAL
SPECIMEN SOURCE: NORMAL

## 2017-11-07 ENCOUNTER — TELEPHONE (OUTPATIENT)
Dept: INTERNAL MEDICINE | Facility: CLINIC | Age: 62
End: 2017-11-07

## 2017-11-07 DIAGNOSIS — N40.2 PROSTATE NODULE: Primary | ICD-10-CM

## 2017-11-07 DIAGNOSIS — Z95.2 AORTIC VALVE PROSTHESIS PRESENT: Primary | ICD-10-CM

## 2017-11-07 RX ORDER — SULFAMETHOXAZOLE AND TRIMETHOPRIM 400; 80 MG/1; MG/1
1 TABLET ORAL EVERY 12 HOURS
Qty: 6 TABLET | Refills: 0 | Status: SHIPPED | OUTPATIENT
Start: 2017-11-07 | End: 2018-02-09

## 2017-11-07 RX ORDER — SULFAMETHOXAZOLE/TRIMETHOPRIM 800-160 MG
1 TABLET ORAL EVERY 12 HOURS
Qty: 6 TABLET | Refills: 0 | Status: SHIPPED | OUTPATIENT
Start: 2017-11-07 | End: 2017-11-10

## 2017-11-07 NOTE — TELEPHONE ENCOUNTER
Spoke with Dr Gonzales of Urology.   Patient needs a prostate biopsy.   Discussed bridging with Lovenox (patient has a prosthetic aortic valve).   Faxed Rx for Lovenox to his pharmacy.   He will need to coordinate with anticoagulation clinic.

## 2017-11-08 NOTE — TELEPHONE ENCOUNTER
Discussed lovenox bridging with patient.  He has used lovenox and feels comfortable with self administration.  Assisted to schedule INR visit post-op.  Will also review dosing on 11/22 when he comes for next INR check.  Amanda Wheeler RN

## 2017-11-22 ENCOUNTER — ANTICOAGULATION THERAPY VISIT (OUTPATIENT)
Dept: ANTICOAGULATION | Facility: CLINIC | Age: 62
End: 2017-11-22
Payer: COMMERCIAL

## 2017-11-22 DIAGNOSIS — Z79.01 LONG-TERM (CURRENT) USE OF ANTICOAGULANTS: ICD-10-CM

## 2017-11-22 DIAGNOSIS — I48.91 ATRIAL FIBRILLATION, UNSPECIFIED TYPE (H): ICD-10-CM

## 2017-11-22 DIAGNOSIS — Z95.2 AORTIC VALVE PROSTHESIS PRESENT: ICD-10-CM

## 2017-11-22 LAB — INR POINT OF CARE: 2.2 (ref 0.86–1.14)

## 2017-11-22 PROCEDURE — 99207 ZZC NO CHARGE NURSE ONLY: CPT

## 2017-11-22 PROCEDURE — 36416 COLLJ CAPILLARY BLOOD SPEC: CPT

## 2017-11-22 PROCEDURE — 85610 PROTHROMBIN TIME: CPT | Mod: QW

## 2017-11-22 NOTE — MR AVS SNAPSHOT
Todd S Aschoff   11/22/2017 1:30 PM   Anticoagulation Therapy Visit    Description:  61 year old male   Provider:  [facility]   Department:  [facility]           INR as of 11/22/2017     Today's INR 2.2!      Anticoagulation Summary as of 11/22/2017     INR goal 2.5-3.5   Today's INR 2.2!   Full instructions 11/22: 7.5 mg; 11/30: Hold; 12/1: Hold; 12/2: Hold; 12/3: Hold; Otherwise 5 mg every day   Next INR check 12/8/2017    Indications   Long-term (current) use of anticoagulants [Z79.01] [Z79.01]  Aortic valve prosthesis present [Z95.2]  Atrial fibrillation (H) [I48.91]         Your next Anticoagulation Clinic appointment(s)     Dec 08, 2017  1:30 PM CST   Anticoagulation Visit with [facility]   [facility]    [address] [facility] [address]  [facility] [id number] [phone]              Contact Numbers     [facility] Phone Numbers:  Anticoagulation Clinic Appointments : [phone]  Anticoagulation Nurse: [phone]         November 2017 Details    Sun Mon Tue Wed Thu Fri Sat        1               2               3               4                 5               6               7               8               9               10               11                 12               13               14               15               16               17               18                 19               20               21               22      7.5 mg   See details      23      5 mg         24      5 mg         25      5 mg           26      5 mg         27      5 mg         28      5 mg         29      5 mg         30      Hold   See details         Date Details   11/22 This INR check      11/30 Hold dose   Lovenox               How to take your warfarin dose     To take:  5 mg Take 1 of the 5 mg tablets.    To take:  7.5 mg Take 1.5 of the 5 mg tablets.    Hold Do not take your warfarin dose. See the Details table to the  right for additional instructions.                December 2017 Details    Sun Mon Tue Wed Thu Fri Sat          1      Hold   See details      2      Hold   See details        3      Hold         4      5 mg   See details      5      5 mg   See details      6      5 mg   See details      7      5 mg   See details      8            9                 10               11               12               13               14               15               16                 17               18               19               20               21               22               23                 24               25               26               27               28               29               30                 31                      Date Details   12/01 Hold dose   Lovenox      12/02 Hold dose   Lovenox      12/04 Lovenox      12/05 Lovenox      12/06 Lovenox      12/07 Lovenox       Date of next INR:  12/8/2017         How to take your warfarin dose     To take:  5 mg Take 1 of the 5 mg tablets.    Hold Do not take your warfarin dose. See the Details table to the right for additional instructions.

## 2017-11-22 NOTE — PROGRESS NOTES
ANTICOAGULATION FOLLOW-UP CLINIC VISIT    Patient Name:  Todd S Aschoff  Date:  11/22/2017  Contact Type:  Face to Face    SUBJECTIVE:     Patient Findings     Positives Intentional hold of therapy (Pt is scheduled for a urologic procedure on 12/4/17.  Reviewed lovenox bridging with patient.  Advised him to check with surgeon to see when it is ok to restart blood thinners and follow up in INR clinic about 4-5 days after restarting.)           OBJECTIVE    INR Protime   Date Value Ref Range Status   11/22/2017 2.2 (A) 0.86 - 1.14 Final       ASSESSMENT / PLAN  INR assessment SUB    Recheck INR In: 2 WEEKS    INR Location Clinic      Anticoagulation Summary as of 11/22/2017     INR goal 2.5-3.5   Today's INR 2.2!   Maintenance plan 5 mg (5 mg x 1) every day   Full instructions 11/22: 7.5 mg; 11/30: Hold; 12/1: Hold; 12/2: Hold; 12/3: Hold; Otherwise 5 mg every day   Weekly total 35 mg   Plan last modified Amanda Wheeler RN (7/19/2017)   Next INR check 12/8/2017   Priority INR   Target end date     Indications   Long-term (current) use of anticoagulants [Z79.01] [Z79.01]  Aortic valve prosthesis present [Z95.2]  Atrial fibrillation (H) [I48.91]         Anticoagulation Episode Summary     INR check location     Preferred lab     Send INR reminders to [facility]    Comments       Anticoagulation Care Providers     Provider Role Specialty Phone number    Obdulio Ingram MD [facility] Internal Medicine [phone]            See the Encounter Report to view Anticoagulation Flowsheet and Dosing Calendar (Go to Encounters tab in chart review, and find the Anticoagulation Therapy Visit)    Dosage adjustment made based on physician directed care plan.    Amanda Wheeler RN

## 2017-11-29 ENCOUNTER — TELEPHONE (OUTPATIENT)
Dept: UROLOGY | Facility: CLINIC | Age: 62
End: 2017-11-29

## 2017-11-29 NOTE — TELEPHONE ENCOUNTER
Central Prior Authorization Team   Phone: [phone]    PA Initiation    Medication: Myrbetriq-Initiated-  Insurance Company: [employer] [place] - Phone [phone] Fax [phone]  Pharmacy Filling the Rx: [id number] - [place], [place] - [address] [facility]  Filling Pharmacy Phone: [phone]  Filling Pharmacy Fax: [phone]  Start Date: 11/29/2017

## 2017-11-29 NOTE — TELEPHONE ENCOUNTER
Patient needs a Formulary Exception for his medication.  He states it is NOT a prior authorization, he had to do one last year.  The medication is Myrbetriq 50 mg 1 QD.  The diagnosis is Over Active Bladder.  This is a renewal.  Insurance information is   [facility] [place]    ID #[id number]  Patient states kem it PRIORITY.    Flor Dodge LPN

## 2017-11-30 NOTE — TELEPHONE ENCOUNTER
Prior Authorization Approval    Authorization Effective Date: 11/30/2017  Authorization Expiration Date: 11/30/2018  Medication: Myrbetriq-Initiated-Approved-  Approved Dose/Quantity:   Reference #:     Insurance Company: [employer] [place] - Phone [phone] Fax [phone]  Expected CoPay: $0.00     CoPay Card Available:      Foundation Assistance Needed:    Which Pharmacy is filling the prescription (Not needed for infusion/clinic administered): [id number] - [place], [place] - [address] [place] [address]  Pharmacy Notified: Yes  Patient Notified: Yes

## 2017-12-04 ENCOUNTER — OFFICE VISIT (OUTPATIENT)
Dept: UROLOGY | Facility: CLINIC | Age: 62
End: 2017-12-04
Payer: COMMERCIAL

## 2017-12-04 VITALS
SYSTOLIC BLOOD PRESSURE: 126 MMHG | HEIGHT: 73 IN | DIASTOLIC BLOOD PRESSURE: 70 MMHG | OXYGEN SATURATION: 97 % | WEIGHT: 279 LBS | HEART RATE: 63 BPM | BODY MASS INDEX: 36.98 KG/M2

## 2017-12-04 DIAGNOSIS — N40.2 PROSTATE NODULE: Primary | ICD-10-CM

## 2017-12-04 DIAGNOSIS — R97.20 ELEVATED PROSTATE SPECIFIC ANTIGEN (PSA): ICD-10-CM

## 2017-12-04 PROCEDURE — 76872 US TRANSRECTAL: CPT | Performed by: UROLOGY

## 2017-12-04 PROCEDURE — 55700 HC BIOPSY PROSTATE NEEDLE/PUNCH: CPT | Performed by: UROLOGY

## 2017-12-04 PROCEDURE — 88305 TISSUE EXAM BY PATHOLOGIST: CPT | Performed by: UROLOGY

## 2017-12-04 ASSESSMENT — PAIN SCALES - GENERAL: PAINLEVEL: NO PAIN (0)

## 2017-12-04 NOTE — PATIENT INSTRUCTIONS
[facility]  Transrectal Ultrasound  Post Operative Information    The physician who performed your Transrectal Ultrasound is Dr. Lerner (telephone number [phone]).  Please contact this doctor if you have any problems or questions.  If unable to reach your doctor, please return to the Emergency Department.      Take one antibiotic the evening of the procedure and then as directed on your prescription.    Drink at least 6-8 glasses of fluids for the first 48 hours.    Avoid heavy lifting and strenuous activity for 48 hours.    Avoid sexual intercourse for the first 24 hours.    No aspirin or ibuprofen products (Motrin, Advil, Nuprin, ect.) for one week.  You may take acetaminophen (Tylenol) for pain.    You may notice a small amount of blood on the tissue after a bowel movement.    You may pass blood with clots in your urine following the procedure.  The amount will decrease with time but may be visible for up to two weeks.     You make have blood in your semen for 4 weeks after the procedure.    You may experience mild perineal (groin area) discomfort after the procedure.    Please call you doctor if you have any of the follow symptoms:  Fever  Increase in the amount of blood passed  Severe discomfort or pain  You may start you warfarin in 2-3 days if you don't  see in any blood in your   Urine contune with the lovenox          Ursula StartexMA

## 2017-12-04 NOTE — NURSING NOTE
The following medication was given:     MEDICATION: Gentamicin  ROUTE: IM  SITE: Right upper outer glute  DOSE: 80 mg/2ml  LOT #: [id number]  :  [employer]  EXPIRATION DATE:  10/18  NDC#: 20952-875-61    Flor Dodge LPN

## 2017-12-04 NOTE — MR AVS SNAPSHOT
After Visit Summary   12/4/2017    Todd S Aschoff    MRN: [id number]           Patient Information     Date Of Birth          1955        Visit Information        Provider Department      12/4/2017 9:00 AM Tin Lerner MD; [facility] BX ROOM [facility] [place]        Today's Diagnoses     Prostate nodule    -  1      Care Instructions    [facility]  Transrectal Ultrasound  Post Operative Information    The physician who performed your Transrectal Ultrasound is Dr. Lerner (telephone number [phone]).  Please contact this doctor if you have any problems or questions.  If unable to reach your doctor, please return to the Emergency Department.      Take one antibiotic the evening of the procedure and then as directed on your prescription.    Drink at least 6-8 glasses of fluids for the first 48 hours.    Avoid heavy lifting and strenuous activity for 48 hours.    Avoid sexual intercourse for the first 24 hours.    No aspirin or ibuprofen products (Motrin, Advil, Nuprin, ect.) for one week.  You may take acetaminophen (Tylenol) for pain.    You may notice a small amount of blood on the tissue after a bowel movement.    You may pass blood with clots in your urine following the procedure.  The amount will decrease with time but may be visible for up to two weeks.     You make have blood in your semen for 4 weeks after the procedure.    You may experience mild perineal (groin area) discomfort after the procedure.    Please call you doctor if you have any of the follow symptoms:  Fever  Increase in the amount of blood passed  Severe discomfort or pain  You may start you warfarin in 2-3 days if you don't  see in any blood in your   Urine contune with the lovenox          [place]              Follow-ups after your visit        Your next 10 appointments already scheduled     Dec 08, 2017  1:30 PM CST   Anticoagulation Visit with [facility]   [facility]    [address]  [facility] [address] [phone]            Dec 11, 2017  3:45 PM CST   Anticoagulation Visit with [facility]   [facility]    [address]  [facility] [id number] [phone]            Dec 14, 2017  2:30 PM CST   Return Visit with Tin Lerner MD   [facility] ([facility]    [address]  [address]  [facility] [address] [phone]              Who to contact     If you have questions or need follow up information about today's clinic visit or your schedule please contact [facility] [place] directly at [phone].  Normal or non-critical lab and imaging results will be communicated to you by MyChart, letter or phone within 4 business days after the clinic has received the results. If you do not hear from us within 7 days, please contact the clinic through [employer] or phone. If you have a critical or abnormal lab result, we will notify you by phone as soon as possible.  Submit refill requests through [employer] or call your pharmacy and they will forward the refill request to us. Please allow 3 business days for your refill to be completed.          Additional Information About Your Visit        MyChart Information     [employer] lets you send messages to your doctor, view your test results, renew your prescriptions, schedule appointments and more. To sign up, go to [email or web address] . Click on \"Log in\" on the left side of the screen, which will take you to the Welcome page. Then click on \"Sign up Now\" on the right side of the page.     You will be asked to enter the access code listed below, as well as some personal information. Please follow the directions to create your username and " "password.     Your access code is: [email or web address]  Expires: 3/4/2018 10:08 AM     Your access code will  in 90 days. If you need help or a new code, please call your [facility] or [phone].        Care EveryWhere ID     This is your Care EveryWhere ID. This could be used by other organizations to access your [place] medical records  [id number]        Your Vitals Were     Pulse Height Pulse Oximetry BMI (Body Mass Index)          63 1.854 m (6' 1\") 97% 36.81 kg/m2         Blood Pressure from Last 3 Encounters:   17 126/70   17 126/70   17 131/82    Weight from Last 3 Encounters:   17 126.6 kg (279 lb)   17 124.3 kg (274 lb)   17 124.4 kg (274 lb 4.8 oz)              We Performed the Following     Surgical pathology exam [[id number]]        Primary Care Provider Office Phone # Fax #    Obdulio Ingram [phone]       [address] [place] [address]        Equal Access to Services     [facility]: Hadii aad ku hadasho Soomaali, waaxda luqadaha, qaybta kaalmada adeegyada, waxay geoffreyin haynathanieln margarito ambrizn ah. So [facility] [phone].    ATENCIÓN: Si habla español, tiene a thomas disposición servicios gratuitos de asistencia lingüística. Llame al [phone].    We comply with applicable federal civil rights laws and [place] laws. We do not discriminate on the basis of race, color, national origin, age, disability, sex, sexual orientation, or gender identity.            Thank you!     Thank you for choosing [facility] [place]  for your care. Our goal is always to provide you with excellent care. Hearing back from our patients is one way we can continue to improve our services. Please take a few minutes to complete the written survey that you may receive in the mail after your visit with us. Thank you!             Your Updated Medication List - Protect others around you: Learn how to safely use, store and " throw away your medicines at www.disposemymeds.org.          This list is accurate as of: 12/4/17 10:08 AM.  Always use your most recent med list.                   Brand Name Dispense Instructions for use Diagnosis    * acetaminophen 650 MG 8 hour tablet    ACETAMINOPHEN 8 HOUR    250 tablet    Take 650 mg by mouth 2 times daily    Chronic low back pain       * MAPAP ARTHRITIS PAIN 650 MG CR tablet   Generic drug:  acetaminophen     180 tablet    TAKE 650 MG BY MOUTH 2 TIMES DAILY    Chronic low back pain       azelastine 0.1 % spray    ASTELIN    3 Bottle    Spray 2 sprays in nostril 2 times daily as needed    Chronic rhinitis       cetirizine 10 MG tablet    zyrTEC    30 tablet    TAKE ONE TABLET BY MOUTH ONE TIME DAILY AS NEEDED.    Chronic rhinitis       cyclobenzaprine 10 MG tablet    FLEXERIL    270 tablet    Take 1 tablet (10 mg) by mouth 3 times daily as needed for muscle spasms    Muscle spasm       diazepam 5 MG tablet    VALIUM    270 tablet    TAKE 1 TABLET BY MOUTH EVERY 8 HOURS AS NEEDED FOR ANXIETY OR MUSCLE SPASMS MAXIMUM #270 EVERY 90 DAYS.    Back muscle spasm       donepezil 10 MG tablet    ARICEPT    90 tablet    TAKE ONE TABLET BY MOUTH AT BEDTIME    Memory difficulties       * enoxaparin 120 MG/0.8ML injection    LOVENOX    20 Syringe    Inject 0.9 mLs (135 mg) Subcutaneous every 12 hours    Aortic valve prosthesis present, Long term current use of anticoagulant therapy       * enoxaparin 120 MG/0.8ML injection    LOVENOX    20 Syringe    Inject 0.8 mLs (120 mg) Subcutaneous daily    Aortic valve prosthesis present       guanFACINE 1 MG tablet    TENEX    90 tablet    Take 1 tablet (1 mg) by mouth At Bedtime    Major depressive disorder, recurrent episode, in full remission (H)       isometheptene-acetaminophen-dichloralphenazone -100 MG per capsule    MIDRIN    30 capsule    Take 1 capsule by mouth 4 times daily as needed    Headache(784.0)       levothyroxine 150 MCG tablet     SYNTHROID/LEVOTHROID    90 tablet    TAKE ONE TABLET BY MOUTH ONE TIME DAILY    Acquired hypothyroidism       mirabegron 50 MG 24 hr tablet    MYRBETRIQ    90 tablet    Take 1 tablet (50 mg) by mouth daily    Overactive bladder       pregabalin 100 MG capsule    LYRICA    270 capsule    Take 1 capsule (100 mg) by mouth 3 times daily Do not give if somnolent/sedated.    Chronic low back pain with sciatica, sciatica laterality unspecified, unspecified back pain laterality       propafenone 150 MG Tabs tablet    RYTHMOL    270 tablet    Take 1 tablet (150 mg) by mouth every 8 hours    Chronic atrial fibrillation (H)       senna-docusate 8.6-50 MG per tablet    SENOKOT-S;PERICOLACE    60 tablet    Take 1 tablet by mouth 2 times daily as needed for constipation    Constipation, unspecified constipation type       simvastatin 40 MG tablet    ZOCOR    90 tablet    Take 1 tablet (40 mg) by mouth At Bedtime    Hyperlipidemia LDL goal <130       sulfamethoxazole-trimethoprim 400-80 MG per tablet    BACTRIM/SEPTRA    6 tablet    Take 1 tablet by mouth every 12 hours Take one tab every 12 hours , starting day before procedure    Prostate nodule       traZODone 50 MG tablet    DESYREL          valACYclovir 1000 mg tablet    VALTREX    21 tablet    Take 1 tablet (1,000 mg) by mouth 3 times daily        venlafaxine 150 MG Tb24 24 hr tablet    EFFEXOR-ER    90 each    Take 1 tablet (150 mg) by mouth daily (with breakfast)    Major depressive disorder, recurrent episode, in full remission (H)       warfarin 5 MG tablet    COUMADIN    100 tablet    take 1 tablet ( 5mg) daily except 1 and 1/2 tablet (7.5mg) on Wednesdays and Saturdays  or as intructed by INR clinic    Current use of long term anticoagulation       * Notice:  This list has 4 medication(s) that are the same as other medications prescribed for you. Read the directions carefully, and ask your doctor or other care provider to review them with you.

## 2017-12-04 NOTE — LETTER
12/4/2017       RE: Todd S Aschoff  [address]  [place] [address]     Dear Colleague,    Thank you for referring your patient, Todd S Aschoff, to the [facility] [place] at [facility]. Please see a copy of my visit note below.    PREPROCEDURE DIAGNOSIS: Elevated PSA.   POSTPROCEDURE DIAGNOSIS: Elevated PSA.   PROCEDURE: Transrectal ultrasound sizing and transrectal ultrasound guided prostatic needle biopsy for Todd Aschoff who is a 61 year old male. Has been holding Coumadin and bridging with Lovenox - last dose yesterday.  SURGEON: Tin Lerner  ANESTHESIA: 5 mL of 1% periprostatic block bilaterally   DESCRIPTION OF PROCEDURE: The procedure, the outcome, the anesthesia, and the risks were discussed with the patient. Informed consent was obtained and signed and a timeout was completed prior to the procedure. Digital rectal examination was performed with the below findings noted. Anesthesia was administered as noted above and the transrectal ultrasound probe was inserted, sizing was performed, and the below findings were noted. 12 core biopsies were taken as described below. The probe was then withdrawn. Patient tolerated the procedure well.   FINDINGS: Digital rectal exam reveals left-sided prostatic induration. Total volume is 46 mL. Hypoechoic lesion bilaterally. We then performed random biopsies.  12 cores were taken with 6 on each side, base, mid and apex.  PLAN: Will follow-up 1-2 weeks to review results. Lovenox tonight. OK to restart Coumadin in 2 days if urine clear.    Tin Lerner MD  Urology  [facility]  Clinic Phone [phone]        Again, thank you for allowing me to participate in the care of your patient.      Sincerely,    Tin Lerner MD

## 2017-12-04 NOTE — PROGRESS NOTES
PREPROCEDURE DIAGNOSIS: Elevated PSA.   POSTPROCEDURE DIAGNOSIS: Elevated PSA.   PROCEDURE: Transrectal ultrasound sizing and transrectal ultrasound guided prostatic needle biopsy for Todd Aschoff who is a 61 year old male. Has been holding Coumadin and bridging with Lovenox - last dose yesterday.  SURGEON: Tin Lerner  ANESTHESIA: 5 mL of 1% periprostatic block bilaterally   DESCRIPTION OF PROCEDURE: The procedure, the outcome, the anesthesia, and the risks were discussed with the patient. Informed consent was obtained and signed and a timeout was completed prior to the procedure. Digital rectal examination was performed with the below findings noted. Anesthesia was administered as noted above and the transrectal ultrasound probe was inserted, sizing was performed, and the below findings were noted. 12 core biopsies were taken as described below. The probe was then withdrawn. Patient tolerated the procedure well.   FINDINGS: Digital rectal exam reveals left-sided prostatic induration. Total volume is 46 mL. Hypoechoic lesion bilaterally. We then performed random biopsies.  12 cores were taken with 6 on each side, base, mid and apex.  PLAN: Will follow-up 1-2 weeks to review results. Lovenox tonight. OK to restart Coumadin in 2 days if urine clear.    Tin Lerner MD  Urology  [facility]  Clinic Phone [phone]

## 2017-12-04 NOTE — NURSING NOTE
Pre-Operative    Consent read and signed: Yes     Allergies   Allergen Reactions     No Known Allergies      Pre-operative antibiotics taken: Yes  Aspirin or other blood thinning medications not taken in 7-10 days:  Yes Patient is taking Lovenox off his Warfarin  Time of Fleet's enema: 7:30AM  The following medication was given:   Patient had a Prostate Block by Dr Lerner    MEDICATION: Lidocaine  ROUTE: Rectal   SITE: Prostate  DOSE: 20 mg/ml  LOT #: [id number]    :  [employer]  EXPIRATION DATE:  03/01/2018  NDC#:1007-1605-16

## 2017-12-05 LAB — COPATH REPORT: NORMAL

## 2017-12-06 ENCOUNTER — TELEPHONE (OUTPATIENT)
Dept: UROLOGY | Facility: CLINIC | Age: 62
End: 2017-12-06

## 2017-12-06 NOTE — TELEPHONE ENCOUNTER
"Nicko  Is calling with update: still passing blood in urine. He describes it has \"dark\" in color but no visible clots and no trouble passing urine. He will not start back on Coumadin today since having hematuria. He is on Lovenox . . Will update Dr Lerner and clarify that he can resume the Coumadin as soon as urine is clear. Had TRUS with biopsy on  12-04-17 Soco Zuñiga LPN  "

## 2017-12-13 ENCOUNTER — ANTICOAGULATION THERAPY VISIT (OUTPATIENT)
Dept: ANTICOAGULATION | Facility: CLINIC | Age: 62
End: 2017-12-13
Payer: COMMERCIAL

## 2017-12-13 DIAGNOSIS — Z79.01 CURRENT USE OF LONG TERM ANTICOAGULATION: ICD-10-CM

## 2017-12-13 DIAGNOSIS — Z95.2 AORTIC VALVE PROSTHESIS PRESENT: ICD-10-CM

## 2017-12-13 DIAGNOSIS — M62.830 BACK MUSCLE SPASM: ICD-10-CM

## 2017-12-13 DIAGNOSIS — R41.3 MEMORY DIFFICULTIES: ICD-10-CM

## 2017-12-13 DIAGNOSIS — Z79.01 LONG-TERM (CURRENT) USE OF ANTICOAGULANTS: ICD-10-CM

## 2017-12-13 LAB — INR POINT OF CARE: 1 (ref 0.86–1.14)

## 2017-12-13 PROCEDURE — 99207 ZZC NO CHARGE NURSE ONLY: CPT

## 2017-12-13 PROCEDURE — 85610 PROTHROMBIN TIME: CPT | Mod: QW

## 2017-12-13 PROCEDURE — 36416 COLLJ CAPILLARY BLOOD SPEC: CPT

## 2017-12-13 RX ORDER — DONEPEZIL HYDROCHLORIDE 10 MG/1
10 TABLET, FILM COATED ORAL AT BEDTIME
Qty: 90 TABLET | Refills: 1 | Status: SHIPPED | OUTPATIENT
Start: 2017-12-13 | End: 2018-06-09

## 2017-12-13 RX ORDER — WARFARIN SODIUM 5 MG/1
5 TABLET ORAL DAILY
Qty: 100 TABLET | Refills: 1 | Status: SHIPPED | OUTPATIENT
Start: 2017-12-13 | End: 2018-05-15

## 2017-12-13 NOTE — PROGRESS NOTES
ANTICOAGULATION FOLLOW-UP CLINIC VISIT    Patient Name:  Todd S Aschoff  Date:  12/13/2017  Contact Type:  Face to Face    SUBJECTIVE:     Patient Findings     Positives Dental/Other procedures (Pt had Prostate procedure on 12/4 then had Hematuria. Last time was Sunday. Still using Lovenox. ), Blood in urine (On Hira 12/10. )           OBJECTIVE    INR Protime   Date Value Ref Range Status   12/13/2017 1.0 0.86 - 1.14 Final       ASSESSMENT / PLAN  INR assessment SUB    Recheck INR In: 5 DAYS    INR Location Clinic      Anticoagulation Summary as of 12/13/2017     INR goal 2.5-3.5   Today's INR 1.0!   Maintenance plan 5 mg (5 mg x 1) every day   Full instructions 12/13: 7.5 mg; Otherwise 5 mg every day   Weekly total 35 mg   Plan last modified Amanda Wheeler RN (7/19/2017)   Next INR check 12/18/2017   Priority INR   Target end date     Indications   Long-term (current) use of anticoagulants [Z79.01] [Z79.01]  Aortic valve prosthesis present [Z95.2]  Atrial fibrillation (H) [I48.91]         Anticoagulation Episode Summary     INR check location     Preferred lab     Send INR reminders to [facility]    Comments       Anticoagulation Care Providers     Provider Role Specialty Phone number    Obdulio Ingram MD Responsible Internal Medicine [phone]            See the Encounter Report to view Anticoagulation Flowsheet and Dosing Calendar (Go to Encounters tab in chart review, and find the Anticoagulation Therapy Visit)    Dosage adjustment made based on physician directed care plan.    Yadi Wells RN

## 2017-12-13 NOTE — MR AVS SNAPSHOT
Todd S Aschoff   12/13/2017 11:15 AM   Anticoagulation Therapy Visit    Description:  61 year old male   Provider:  [facility]   Department:  [facility]           INR as of 12/13/2017     Today's INR 1.0!      Anticoagulation Summary as of 12/13/2017     INR goal 2.5-3.5   Today's INR 1.0!   Full instructions 12/13: 7.5 mg; Otherwise 5 mg every day   Next INR check 12/18/2017    Indications   Long-term (current) use of anticoagulants [Z79.01] [Z79.01]  Aortic valve prosthesis present [Z95.2]  Atrial fibrillation (H) [I48.91]         Your next Anticoagulation Clinic appointment(s)     Dec 18, 2017  3:15 PM CST   Anticoagulation Visit with [facility]   [facility] ([facility]    [address]  [facility] [id number] [phone]              Contact Numbers     [facility] Phone Numbers:  Anticoagulation Clinic Appointments : [phone]  Anticoagulation Nurse: [phone]         December 2017 Details    Sun Mon Tue Wed Thu Fri Sat          1               2                 3               4               5               6               7               8               9                 10               11               12               13      7.5 mg   See details      14      5 mg         15      5 mg         16      5 mg           17      5 mg         18            19               20               21               22               23                 24               25               26               27               28               29               30                 31                      Date Details   12/13 This INR check       Date of next INR:  12/18/2017         How to take your warfarin dose     To take:  5 mg Take 1 of the 5 mg tablets.    To take:  7.5 mg Take 1.5 of the 5 mg tablets.

## 2017-12-14 ENCOUNTER — OFFICE VISIT (OUTPATIENT)
Dept: UROLOGY | Facility: CLINIC | Age: 62
End: 2017-12-14
Payer: COMMERCIAL

## 2017-12-14 VITALS
HEIGHT: 74 IN | DIASTOLIC BLOOD PRESSURE: 78 MMHG | HEART RATE: 80 BPM | SYSTOLIC BLOOD PRESSURE: 130 MMHG | BODY MASS INDEX: 35.29 KG/M2 | WEIGHT: 275 LBS

## 2017-12-14 DIAGNOSIS — R55 SYNCOPE, UNSPECIFIED SYNCOPE TYPE: ICD-10-CM

## 2017-12-14 DIAGNOSIS — R55 SYNCOPE, UNSPECIFIED SYNCOPE TYPE: Primary | ICD-10-CM

## 2017-12-14 DIAGNOSIS — R97.20 ELEVATED PROSTATE SPECIFIC ANTIGEN (PSA): ICD-10-CM

## 2017-12-14 LAB
ERYTHROCYTE [DISTWIDTH] IN BLOOD BY AUTOMATED COUNT: 13 % (ref 10–15)
HCT VFR BLD AUTO: 48.8 % (ref 40–53)
HGB BLD-MCNC: 16.7 G/DL (ref 13.3–17.7)
MCH RBC QN AUTO: 32.2 PG (ref 26.5–33)
MCHC RBC AUTO-ENTMCNC: 34.2 G/DL (ref 31.5–36.5)
MCV RBC AUTO: 94 FL (ref 78–100)
PLATELET # BLD AUTO: 209 10E9/L (ref 150–450)
RBC # BLD AUTO: 5.19 10E12/L (ref 4.4–5.9)
WBC # BLD AUTO: 10.7 10E9/L (ref 4–11)

## 2017-12-14 PROCEDURE — 85027 COMPLETE CBC AUTOMATED: CPT | Performed by: UROLOGY

## 2017-12-14 PROCEDURE — 80048 BASIC METABOLIC PNL TOTAL CA: CPT | Performed by: UROLOGY

## 2017-12-14 PROCEDURE — 36415 COLL VENOUS BLD VENIPUNCTURE: CPT | Performed by: UROLOGY

## 2017-12-14 PROCEDURE — 99213 OFFICE O/P EST LOW 20 MIN: CPT | Performed by: UROLOGY

## 2017-12-14 ASSESSMENT — PAIN SCALES - GENERAL: PAINLEVEL: NO PAIN (0)

## 2017-12-14 NOTE — MR AVS SNAPSHOT
After Visit Summary   12/14/2017    Todd S Aschoff    MRN: [id number]           Patient Information     Date Of Birth          1955        Visit Information        Provider Department      12/14/2017 2:30 PM Tin Lerner MD [facility] [place]        Today's Diagnoses     Syncope, unspecified syncope type    -  1    Elevated prostate specific antigen (PSA)           Follow-ups after your visit        Follow-up notes from your care team     Return in about 6 months (around 6/14/2018).      Your next 10 appointments already scheduled     Dec 14, 2017  3:15 PM CST   [facility]   [facility]    [address]  [facility] [id number] [phone]           Please do not eat 10-12 hours before your appointment if you are coming in fasting for labs on lipids, cholesterol, or glucose (sugar). This does not apply to pregnant women. Water, hot tea and black coffee (with nothing added) are okay. Do not drink other fluids, diet soda or chew gum.            Dec 18, 2017  3:15 PM CST   Anticoagulation Visit with [facility]   [facility]    [address]  [facility] [address] [phone]            Jun 14, 2018  8:00 AM CDT   Return Visit with Geraldo Gonzales MD   [facility] ([facility] [place])    [address]  [address]  [facility] [id number] [phone]              Future tests that were ordered for you today     Open Future Orders        Priority Expected Expires Ordered    PSA tumor marker [[id number]] Routine 6/14/2018 12/14/2018 12/14/2017            Who to contact     If you have questions or need follow up information about today's clinic visit or your schedule please contact [facility] UROLOG[facility]  "directly at [phone].  Normal or non-critical lab and imaging results will be communicated to you by 365nethart, letter or phone within 4 business days after the clinic has received the results. If you do not hear from us within 7 days, please contact the clinic through 365nethart or phone. If you have a critical or abnormal lab result, we will notify you by phone as soon as possible.  Submit refill requests through Tiltap or call your pharmacy and they will forward the refill request to us. Please allow 3 business days for your refill to be completed.          Additional Information About Your Visit        [employer]eth[employer] Information     Tiltap lets you send messages to your doctor, view your test results, renew your prescriptions, schedule appointments and more. To sign up, go to [email or web address] . Click on \"Log in\" on the left side of the screen, which will take you to the Welcome page. Then click on \"Sign up Now\" on the right side of the page.     You will be asked to enter the access code listed below, as well as some personal information. Please follow the directions to create your username and password.     Your access code is: [email or web address]  Expires: 3/4/2018 10:08 AM     Your access code will  in 90 days. If you need help or a new code, please call your [place] clinic or [phone].        Care EveryWhere ID     This is your Care EveryWhere ID. This could be used by other organizations to access your [place] medical records  [id number]        Your Vitals Were     Pulse Height BMI (Body Mass Index)             80 1.867 m (6' 1.5\") 35.79 kg/m2          Blood Pressure from Last 3 Encounters:   17 130/78   17 126/70   17 126/70    Weight from Last 3 Encounters:   17 124.7 kg (275 lb)   17 126.6 kg (279 lb)   17 124.3 kg (274 lb)               Primary Care Provider Office Phone # Fax #    Obdulio Ingram [phone]       [address]  [facility] [address]        Equal Access to Services     PERRI WALTON : Hadii jimbo chow bao Souzma, waaxda luqadaha, qaybta kaalmasharonda hoffanishadarian hunt. So [facility] [phone].    ATENCIÓN: Si habla español, tiene a thomas disposición servicios gratuitos de asistencia lingüística. Elaineame al [phone].    We comply with applicable federal civil rights laws and [place] laws. We do not discriminate on the basis of race, color, national origin, age, disability, sex, sexual orientation, or gender identity.            Thank you!     Thank you for choosing [facility] [place]  for your care. Our goal is always to provide you with excellent care. Hearing back from our patients is one way we can continue to improve our services. Please take a few minutes to complete the written survey that you may receive in the mail after your visit with us. Thank you!             Your Updated Medication List - Protect others around you: Learn how to safely use, store and throw away your medicines at www.disposemymeds.org.          This list is accurate as of: 12/14/17  3:07 PM.  Always use your most recent med list.                   Brand Name Dispense Instructions for use Diagnosis    * acetaminophen 650 MG 8 hour tablet    ACETAMINOPHEN 8 HOUR    250 tablet    Take 650 mg by mouth 2 times daily    Chronic low back pain       * MAPAP ARTHRITIS PAIN 650 MG CR tablet   Generic drug:  acetaminophen     180 tablet    TAKE 650 MG BY MOUTH 2 TIMES DAILY    Chronic low back pain       azelastine 0.1 % spray    ASTELIN    3 Bottle    Spray 2 sprays in nostril 2 times daily as needed    Chronic rhinitis       cetirizine 10 MG tablet    zyrTEC    30 tablet    TAKE ONE TABLET BY MOUTH ONE TIME DAILY AS NEEDED.    Chronic rhinitis       cyclobenzaprine 10 MG tablet    FLEXERIL    270 tablet    Take 1 tablet (10 mg) by mouth 3 times daily as needed for muscle spasms    Muscle  spasm       diazepam 5 MG tablet    VALIUM    270 tablet    TAKE 1 TABLET BY MOUTH EVERY 8 HOURS AS NEEDED FOR ANXIETY OR MUSCLE SPASMS MAXIMUM #270 EVERY 90 DAYS.    Back muscle spasm       donepezil 10 MG tablet    ARICEPT    90 tablet    Take 1 tablet (10 mg) by mouth At Bedtime    Memory difficulties       * enoxaparin 120 MG/0.8ML injection    LOVENOX    20 Syringe    Inject 0.9 mLs (135 mg) Subcutaneous every 12 hours    Aortic valve prosthesis present, Long term current use of anticoagulant therapy       * enoxaparin 120 MG/0.8ML injection    LOVENOX    20 Syringe    Inject 0.8 mLs (120 mg) Subcutaneous daily    Aortic valve prosthesis present       guanFACINE 1 MG tablet    TENEX    90 tablet    Take 1 tablet (1 mg) by mouth At Bedtime    Major depressive disorder, recurrent episode, in full remission (H)       isometheptene-acetaminophen-dichloralphenazone -100 MG per capsule    MIDRIN    30 capsule    Take 1 capsule by mouth 4 times daily as needed    Headache(784.0)       levothyroxine 150 MCG tablet    SYNTHROID/LEVOTHROID    90 tablet    TAKE ONE TABLET BY MOUTH ONE TIME DAILY    Acquired hypothyroidism       mirabegron 50 MG 24 hr tablet    MYRBETRIQ    90 tablet    Take 1 tablet (50 mg) by mouth daily    Overactive bladder       pregabalin 100 MG capsule    LYRICA    270 capsule    Take 1 capsule (100 mg) by mouth 3 times daily Do not give if somnolent/sedated.    Chronic low back pain with sciatica, sciatica laterality unspecified, unspecified back pain laterality       propafenone 150 MG Tabs tablet    RYTHMOL    270 tablet    Take 1 tablet (150 mg) by mouth every 8 hours    Chronic atrial fibrillation (H)       senna-docusate 8.6-50 MG per tablet    SENOKOT-S;PERICOLACE    60 tablet    Take 1 tablet by mouth 2 times daily as needed for constipation    Constipation, unspecified constipation type       simvastatin 40 MG tablet    ZOCOR    90 tablet    Take 1 tablet (40 mg) by mouth At Bedtime     Hyperlipidemia LDL goal <130       sulfamethoxazole-trimethoprim 400-80 MG per tablet    BACTRIM/SEPTRA    6 tablet    Take 1 tablet by mouth every 12 hours Take one tab every 12 hours , starting day before procedure    Prostate nodule       traZODone 50 MG tablet    DESYREL          valACYclovir 1000 mg tablet    VALTREX    21 tablet    Take 1 tablet (1,000 mg) by mouth 3 times daily        venlafaxine 150 MG Tb24 24 hr tablet    EFFEXOR-ER    90 each    Take 1 tablet (150 mg) by mouth daily (with breakfast)    Major depressive disorder, recurrent episode, in full remission (H)       warfarin 5 MG tablet    COUMADIN    100 tablet    Take 1 tablet (5 mg) by mouth daily or as intructed by INR clinic    Current use of long term anticoagulation       * Notice:  This list has 4 medication(s) that are the same as other medications prescribed for you. Read the directions carefully, and ask your doctor or other care provider to review them with you.

## 2017-12-14 NOTE — LETTER
"12/14/2017       RE: Todd S Aschoff  [address]  [place] [address]     Dear Colleague,    Thank you for referring your patient, Todd S Aschoff, to the [facility] [place] at [facility]. Please see a copy of my visit note below.      CHIEF COMPLAINT   It was my pleasure to see Todd S Aschoff who is a 61 year old male for follow-up of elevated PSA.      HPI   Todd S Aschoff is a very pleasant 61 year old male who presents with a history of Elevated PSA (Prostate Specific Antigen) and abnormal rectal exam per Dr. Gonzales 11/1/2017. Subsequently underwent TRUS biopsy - results as below.    Notes he had blood in his urine for several days. Light-headed on Saturday. Overall feeling OK today, but concerned about bleeding. Has been on Lovenox for bridging and just started Coumadin yesterday. Urine has now cleared.    PSA was 3.8 11/1/2017. Had suspicious nodule per Dr. Gonzales.    FINAL DIAGNOSIS:   A: Left lateral base, biopsy   - Benign prostate tissue     B: Left lateral mid, biopsy   - Chronic prostatitis and atrophy     C: Left lateral apex, biopsy   - Chronic prostatitis and atrophy     D: Left base, biopsy   - Focal atrophy     E: Left mid, biopsy   - Chronic prostatitis and atrophy     F: Left apex, biopsy   - Focal chronic prostatitis     G: Right lateral base, biopsy   - Focal atrophy     H: Right lateral mid, biopsy   - Chronic prostatitis and atrophy     I: Right lateral apex, biopsy   - Focal atrophy focal atrophy     J: Right base, biopsy   - Focal atrophy     K: Right mid, biopsies   - Focal atrophy     L: Right apex, biopsy   - Focal atrophy     PHYSICAL EXAM  Vitals:    12/14/17 1436   BP: 130/78   Pulse: 80   Weight: 124.7 kg (275 lb)   Height: 1.867 m (6' 1.5\")     Constitutional: Alert, no acute distress  Psychiatric: Normal mood and affect    ASSESSMENT and PLAN  61 year old male with history of elevated PSA and abnormal " SASHA. Inflammation noted on TRUS biopsy but no evidence of malignancy. Plan for PSA in six months.    Regarding bleeding, we discussed that it would be uncommon to bleed enough from biopsy to become anemic, but given possible syncopal episode, will check labs today.    - CBC, BMP today - will call with results  - PSA and follow-up in six months    I spent over 15 minutes with the patient.  Over half this time was spent on counseling regarding elevated PSA.      Again, thank you for allowing me to participate in the care of your patient.      Sincerely,    Tin Lerner MD

## 2017-12-14 NOTE — NURSING NOTE
Here with daughter to discuss biopsy results. Did state he had a lot of bleeding post biopsy in the urien however now is clear. Soco Zuñiga LPN

## 2017-12-14 NOTE — PROGRESS NOTES
"  CHIEF COMPLAINT   It was my pleasure to see Todd S Aschoff who is a 61 year old male for follow-up of elevated PSA.      HPI   Todd S Aschoff is a very pleasant 61 year old male who presents with a history of Elevated PSA (Prostate Specific Antigen) and abnormal rectal exam per Dr. Gonzales 11/1/2017. Subsequently underwent TRUS biopsy - results as below.    Notes he had blood in his urine for several days. Light-headed on Saturday. Overall feeling OK today, but concerned about bleeding. Has been on Lovenox for bridging and just started Coumadin yesterday. Urine has now cleared.    PSA was 3.8 11/1/2017. Had suspicious nodule per Dr. Gonzales.    FINAL DIAGNOSIS:   A: Left lateral base, biopsy   - Benign prostate tissue     B: Left lateral mid, biopsy   - Chronic prostatitis and atrophy     C: Left lateral apex, biopsy   - Chronic prostatitis and atrophy     D: Left base, biopsy   - Focal atrophy     E: Left mid, biopsy   - Chronic prostatitis and atrophy     F: Left apex, biopsy   - Focal chronic prostatitis     G: Right lateral base, biopsy   - Focal atrophy     H: Right lateral mid, biopsy   - Chronic prostatitis and atrophy     I: Right lateral apex, biopsy   - Focal atrophy focal atrophy     J: Right base, biopsy   - Focal atrophy     K: Right mid, biopsies   - Focal atrophy     L: Right apex, biopsy   - Focal atrophy     PHYSICAL EXAM  Vitals:    12/14/17 1436   BP: 130/78   Pulse: 80   Weight: 124.7 kg (275 lb)   Height: 1.867 m (6' 1.5\")     Constitutional: Alert, no acute distress  Psychiatric: Normal mood and affect    ASSESSMENT and PLAN  61 year old male with history of elevated PSA and abnormal SASHA. Inflammation noted on TRUS biopsy but no evidence of malignancy. Plan for PSA in six months.    Regarding bleeding, we discussed that it would be uncommon to bleed enough from biopsy to become anemic, but given possible syncopal episode, will check labs today.    - CBC, BMP today - will call with results  - " PSA and follow-up in six months    I spent over 15 minutes with the patient.  Over half this time was spent on counseling regarding elevated PSA.    Tin Lerner MD  Urology  [facility]  Clinic Phone [phone]

## 2017-12-15 ENCOUNTER — TELEPHONE (OUTPATIENT)
Dept: UROLOGY | Facility: CLINIC | Age: 62
End: 2017-12-15

## 2017-12-15 LAB
ANION GAP SERPL CALCULATED.3IONS-SCNC: 6 MMOL/L (ref 3–14)
BUN SERPL-MCNC: 12 MG/DL (ref 7–30)
CALCIUM SERPL-MCNC: 9.1 MG/DL (ref 8.5–10.1)
CHLORIDE SERPL-SCNC: 103 MMOL/L (ref 94–109)
CO2 SERPL-SCNC: 30 MMOL/L (ref 20–32)
CREAT SERPL-MCNC: 0.98 MG/DL (ref 0.66–1.25)
GFR SERPL CREATININE-BSD FRML MDRD: 78 ML/MIN/1.7M2
GLUCOSE SERPL-MCNC: 76 MG/DL (ref 70–99)
POTASSIUM SERPL-SCNC: 4.4 MMOL/L (ref 3.4–5.3)
SODIUM SERPL-SCNC: 139 MMOL/L (ref 133–144)

## 2017-12-18 ENCOUNTER — ANTICOAGULATION THERAPY VISIT (OUTPATIENT)
Dept: ANTICOAGULATION | Facility: CLINIC | Age: 62
End: 2017-12-18
Payer: COMMERCIAL

## 2017-12-18 DIAGNOSIS — Z79.01 LONG-TERM (CURRENT) USE OF ANTICOAGULANTS: ICD-10-CM

## 2017-12-18 DIAGNOSIS — Z95.2 AORTIC VALVE PROSTHESIS PRESENT: ICD-10-CM

## 2017-12-18 LAB — INR POINT OF CARE: 1.3 (ref 0.86–1.14)

## 2017-12-18 PROCEDURE — 36416 COLLJ CAPILLARY BLOOD SPEC: CPT

## 2017-12-18 PROCEDURE — 85610 PROTHROMBIN TIME: CPT | Mod: QW

## 2017-12-21 ENCOUNTER — ANTICOAGULATION THERAPY VISIT (OUTPATIENT)
Dept: ANTICOAGULATION | Facility: CLINIC | Age: 62
End: 2017-12-21
Payer: COMMERCIAL

## 2017-12-21 DIAGNOSIS — Z79.01 LONG-TERM (CURRENT) USE OF ANTICOAGULANTS: ICD-10-CM

## 2017-12-21 DIAGNOSIS — Z95.2 AORTIC VALVE PROSTHESIS PRESENT: ICD-10-CM

## 2017-12-21 LAB — INR POINT OF CARE: 2 (ref 0.86–1.14)

## 2017-12-21 PROCEDURE — 99207 ZZC NO CHARGE NURSE ONLY: CPT

## 2017-12-21 PROCEDURE — 85610 PROTHROMBIN TIME: CPT | Mod: QW

## 2017-12-21 PROCEDURE — 36416 COLLJ CAPILLARY BLOOD SPEC: CPT

## 2017-12-21 NOTE — PROGRESS NOTES
ANTICOAGULATION FOLLOW-UP CLINIC VISIT    Patient Name:  Todd S Aschoff  Date:  12/21/2017  Contact Type:  Face to Face    SUBJECTIVE:     Patient Findings     Comments Pt held warfarin for a procedure, restarted warfarin 12/4/17, INR is slowly rising. Pt has 3 Lovenox injections           OBJECTIVE    INR Protime   Date Value Ref Range Status   12/21/2017 2.0 (A) 0.86 - 1.14 Final       ASSESSMENT / PLAN  INR assessment SUB    Recheck INR In: 1 WEEK    INR Location Clinic      Anticoagulation Summary as of 12/21/2017     INR goal 2.5-3.5   Today's INR 2.0!   Maintenance plan 5 mg (5 mg x 1) every day   Full instructions 12/21: 10 mg; Otherwise 5 mg every day   Weekly total 35 mg   Plan last modified Amanda Wheeler RN (7/19/2017)   Next INR check 12/28/2017   Priority INR   Target end date     Indications   Long-term (current) use of anticoagulants [Z79.01] [Z79.01]  Aortic valve prosthesis present [Z95.2]  Atrial fibrillation (H) [I48.91]         Anticoagulation Episode Summary     INR check location     Preferred lab     Send INR reminders to [facility]    Comments       Anticoagulation Care Providers     Provider Role Specialty Phone number    Obdulio Ingram MD Responsible Internal Medicine [phone]            See the Encounter Report to view Anticoagulation Flowsheet and Dosing Calendar (Go to Encounters tab in chart review, and find the Anticoagulation Therapy Visit)    Dosage adjustment made based on physician directed care plan.    Lucina Andrew RN

## 2017-12-21 NOTE — MR AVS SNAPSHOT
Todd S Aschoff   12/21/2017 9:15 AM   Anticoagulation Therapy Visit    Description:  62 year old male   Provider:  [facility]   Department:  [facility]           INR as of 12/21/2017     Today's INR 2.0!      Anticoagulation Summary as of 12/21/2017     INR goal 2.5-3.5   Today's INR 2.0!   Full instructions 12/21: 10 mg; Otherwise 5 mg every day   Next INR check 12/28/2017    Indications   Long-term (current) use of anticoagulants [Z79.01] [Z79.01]  Aortic valve prosthesis present [Z95.2]  Atrial fibrillation (H) [I48.91]         Your next Anticoagulation Clinic appointment(s)     Dec 28, 2017  7:45 AM CST   Anticoagulation Visit with [facility]   [facility]    [address]  [facility] [id number] [phone]              Contact Numbers     [facility] Phone Numbers:  Anticoagulation Clinic Appointments : [phone]  Anticoagulation Nurse: [phone]         December 2017 Details    Sun Mon Tue Wed Thu Fri Sat          1               2                 3               4               5               6               7               8               9                 10               11               12               13               14               15               16                 17               18               19               20               21      10 mg   See details      22      5 mg         23      5 mg           24      5 mg         25      5 mg         26      5 mg         27      5 mg         28            29               30                 31                      Date Details   12/21 This INR check       Date of next INR:  12/28/2017         How to take your warfarin dose     To take:  5 mg Take 1 of the 5 mg tablets.    To take:  10 mg Take 2 of the 5 mg tablets.

## 2017-12-26 DIAGNOSIS — J31.0 CHRONIC RHINITIS: ICD-10-CM

## 2017-12-26 DIAGNOSIS — F51.01 PRIMARY INSOMNIA: Primary | ICD-10-CM

## 2017-12-26 DIAGNOSIS — M62.830 BACK MUSCLE SPASM: ICD-10-CM

## 2017-12-27 DIAGNOSIS — J31.0 CHRONIC RHINITIS: ICD-10-CM

## 2017-12-28 ENCOUNTER — ANTICOAGULATION THERAPY VISIT (OUTPATIENT)
Dept: ANTICOAGULATION | Facility: CLINIC | Age: 62
End: 2017-12-28
Payer: COMMERCIAL

## 2017-12-28 DIAGNOSIS — Z95.2 AORTIC VALVE PROSTHESIS PRESENT: ICD-10-CM

## 2017-12-28 DIAGNOSIS — Z79.01 LONG-TERM (CURRENT) USE OF ANTICOAGULANTS: ICD-10-CM

## 2017-12-28 LAB — INR POINT OF CARE: 1.8 (ref 0.86–1.14)

## 2017-12-28 PROCEDURE — 85610 PROTHROMBIN TIME: CPT | Mod: QW

## 2017-12-28 PROCEDURE — 99207 ZZC NO CHARGE NURSE ONLY: CPT

## 2017-12-28 PROCEDURE — 36416 COLLJ CAPILLARY BLOOD SPEC: CPT

## 2017-12-28 RX ORDER — CETIRIZINE HYDROCHLORIDE 10 MG/1
TABLET ORAL
Qty: 30 TABLET | Refills: 3 | Status: SHIPPED | OUTPATIENT
Start: 2017-12-28 | End: 2018-04-23

## 2017-12-28 NOTE — TELEPHONE ENCOUNTER
Requested Prescriptions   Pending Prescriptions Disp Refills     cetirizine (ZYRTEC) 10 MG tablet [Pharmacy Med Name: Cetirizine HCl Oral Tablet 10 MG] 30 tablet 5     Sig: TAKE 1 TABLET BY MOUTH ONCE DAILY AS NEEDED.    Antihistamines Protocol Passed    12/27/2017  7:02 AM       Passed - Recent or future visit with authorizing provider's specialty    Patient had office visit in the last year or has a visit in the next 30 days with authorizing provider.  See chart review.   Last OV: 09/28/17; last physical: 04/28/17       Passed - Patient is age 3 or older    Apply age and/or weight-based dosing for peds patients age 3 and older.  Forward request to provider for patients under the age of 3.        Prescription approved per [facility] Refill Protocol.

## 2017-12-28 NOTE — TELEPHONE ENCOUNTER
Valium      Last Written Prescription Date:  10/19/17  Last Fill Quantity: 270,   # refills: 0  Last Office Visit: 09/28/17  Future Office visit:       Routing refill request to provider for review/approval because:  Drug not on the [facility], [facility] or  Health refill protocol or controlled substance    CSA in epic.   Please advise, thanks.

## 2017-12-28 NOTE — TELEPHONE ENCOUNTER
Pt walks-in, requests status update on refill below. Relay request sent to provider. Also requests trazodone refill.     Requested Prescriptions   Pending Prescriptions Disp Refills     diazepam (VALIUM) 5 MG tablet [Pharmacy Med Name: DiazePAM Oral Tablet 5 MG] 270 tablet 0     Sig: TAKE 1 TABLET BY MOUTH EVERY 8 HOURS AS NEEDED FOR ANXIETY OR MUSCLE SPASMS.    There is no refill protocol information for this order        traZODone (DESYREL) 50 MG tablet 90 tablet 2    Serotonin Modulators Passed    12/28/2017 10:53 AM       Passed - Recent or future visit with authorizing provider's specialty    Patient had office visit in the last year or has a visit in the next 30 days with authorizing provider.  See chart review.        Passed - Patient is age 18 or older        Routing refill request to provider for review/approval because:  Medication is reported/historical    Unsure of dosing of medication. Called pt to clarify dosing instructions, vm left for pt to call clinic back.

## 2017-12-28 NOTE — PROGRESS NOTES
ANTICOAGULATION FOLLOW-UP CLINIC VISIT    Patient Name:  Todd S Aschoff  Date:  12/28/2017  Contact Type:  Face to Face    SUBJECTIVE:     Patient Findings     Positives Unexplained INR or factor level change    Comments Pt denies any missed doses or changes in diet or meds.            OBJECTIVE    INR Protime   Date Value Ref Range Status   12/28/2017 1.8 (A) 0.86 - 1.14 Final       ASSESSMENT / PLAN  INR assessment SUB    Recheck INR In: 6 DAYS    INR Location Clinic      Anticoagulation Summary as of 12/28/2017     INR goal 2.5-3.5   Today's INR 1.8!   Maintenance plan 5 mg (5 mg x 1) every day   Full instructions 12/28: 15 mg; 12/29: 15 mg; Otherwise 5 mg every day   Weekly total 35 mg   Plan last modified Amanda Wheeler RN (7/19/2017)   Next INR check 1/2/2018   Priority INR   Target end date     Indications   Long-term (current) use of anticoagulants [Z79.01] [Z79.01]  Aortic valve prosthesis present [Z95.2]  Atrial fibrillation (H) [I48.91]         Anticoagulation Episode Summary     INR check location     Preferred lab     Send INR reminders to [facility]    Comments       Anticoagulation Care Providers     Provider Role Specialty Phone number    Obdulio Ingram MD Responsible Internal Medicine [phone]            See the Encounter Report to view Anticoagulation Flowsheet and Dosing Calendar (Go to Encounters tab in chart review, and find the Anticoagulation Therapy Visit)    Dosage adjustment made based on physician directed care plan.    Lucina Andrew RN

## 2017-12-28 NOTE — TELEPHONE ENCOUNTER
Patient takes Trazodone 50 mg qhs and the Valium he takes q 8 hour as needed.  Pt states that most days he does use it every 8 hours.    Requests 90 day supply of each.  KYA Shabazz R.N.

## 2017-12-28 NOTE — MR AVS SNAPSHOT
Todd S Aschoff   12/28/2017 10:30 AM   Anticoagulation Therapy Visit    Description:  62 year old male   Provider[facility]  [facility]   Department:  [facility]           INR as of 12/28/2017     Today's INR 1.8!      Anticoagulation Summary as of 12/28/2017     INR goal 2.5-3.5   Today's INR 1.8!   Full instructions 12/28: 15 mg; 12/29: 15 mg; Otherwise 5 mg every day   Next INR check 1/2/2018    Indications   Long-term (current) use of anticoagulants [Z79.01] [Z79.01]  Aortic valve prosthesis present [Z95.2]  Atrial fibrillation (H) [I48.91]         Your next Anticoagulation Clinic appointment(s)     Jan 03, 2018  4:00 PM CST   [facility] Visit with [facility]   [facility]    [address] [facility] [address]  [facility] [id number] [phone]              Contact Numbers     [facility] Phone Numbers:  Anticoagulation Clinic Appointments : [phone]  Anticoagulation Nurse: [phone]         December 2017 Details    Sun Mon Tue Wed Thu Fri Sat          1               2                 3               4               5               6               7               8               9                 10               11               12               13               14               15               16                 17               18               19               20               21               22               23                 24               25               26               27               28      15 mg   See details      29      15 mg         30      5 mg           31      5 mg                Date Details   12/28 This INR check               How to take your warfarin dose     To take:  5 mg Take 1 of the 5 mg tablets.    To take:  15 mg Take 3 of the 5 mg tablets.           January 2018 Details    Sun Mon Tue Wed Thu Fri Sat      1      5 mg         2            3               4               5                6                 7               8               9               10               11               12               13                 14               15               16               17               18               19               20                 21               22               23               24               25               26               27                 28               29               30               31                   Date Details   No additional details    Date of next INR:  1/2/2018         How to take your warfarin dose     To take:  5 mg Take 1 of the 5 mg tablets.

## 2017-12-29 RX ORDER — DIAZEPAM 5 MG
TABLET ORAL
Qty: 270 TABLET | Refills: 0 | Status: SHIPPED | OUTPATIENT
Start: 2017-12-29 | End: 2018-03-09

## 2017-12-29 RX ORDER — TRAZODONE HYDROCHLORIDE 50 MG/1
50 TABLET, FILM COATED ORAL AT BEDTIME
Qty: 90 TABLET | Refills: 2 | Status: SHIPPED | OUTPATIENT
Start: 2017-12-29 | End: 2018-08-02

## 2017-12-30 RX ORDER — AZELASTINE 1 MG/ML
SPRAY, METERED NASAL
Qty: 30 ML | Refills: 0 | Status: SHIPPED | OUTPATIENT
Start: 2017-12-30 | End: 2018-05-19

## 2017-12-30 NOTE — TELEPHONE ENCOUNTER
Requested Prescriptions   Pending Prescriptions Disp Refills     azelastine (ASTELIN) 0.1 % spray [Pharmacy Med Name: Azelastine HCl Nasal Solution 0.1 %] 30 mL 2     Sig: USE 2 SPRAYS IN NOSTRIL 2 TIMES DAILY AS NEEDED.    Antihistamines Protocol Passed    12/26/2017  7:01 AM       Passed - Recent or future visit with authorizing provider's specialty    Patient had office visit in the last year or has a visit in the next 30 days with authorizing provider.  See chart review.   Last OV: 09/28/17. Last physical: 04/28/17.       Passed - Patient is age 3 or older    Apply age and/or weight-based dosing for peds patients age 3 and older.    Forward request to provider for patients under the age of 3.          Medication is being filled for 1 time refill only due to:  due for appt in April

## 2018-01-03 ENCOUNTER — ANTICOAGULATION THERAPY VISIT (OUTPATIENT)
Dept: ANTICOAGULATION | Facility: CLINIC | Age: 63
End: 2018-01-03
Payer: COMMERCIAL

## 2018-01-03 DIAGNOSIS — Z95.2 AORTIC VALVE PROSTHESIS PRESENT: ICD-10-CM

## 2018-01-03 DIAGNOSIS — I48.91 ATRIAL FIBRILLATION (H): ICD-10-CM

## 2018-01-03 DIAGNOSIS — Z79.01 LONG-TERM (CURRENT) USE OF ANTICOAGULANTS: ICD-10-CM

## 2018-01-03 LAB — INR POINT OF CARE: 2.5 (ref 0.86–1.14)

## 2018-01-03 PROCEDURE — 99207 ZZC NO CHARGE NURSE ONLY: CPT

## 2018-01-03 PROCEDURE — 36416 COLLJ CAPILLARY BLOOD SPEC: CPT

## 2018-01-03 PROCEDURE — 85610 PROTHROMBIN TIME: CPT | Mod: QW

## 2018-01-03 NOTE — PROGRESS NOTES
ANTICOAGULATION FOLLOW-UP CLINIC VISIT    Patient Name:  Todd S Aschoff  Date:  1/3/2018  Contact Type:  Face to Face    SUBJECTIVE:     Patient Findings     Positives No Problem Findings           OBJECTIVE    INR Protime   Date Value Ref Range Status   01/03/2018 2.5 (A) 0.86 - 1.14 Final       ASSESSMENT / PLAN  INR assessment THER    Recheck INR In: 1 WEEK    INR Location Clinic      Anticoagulation Summary as of 1/3/2018     INR goal 2.5-3.5   Today's INR 2.5   Maintenance plan 5 mg (5 mg x 1) every day   Full instructions 1/3: 10 mg; Otherwise 5 mg every day   Weekly total 35 mg   Plan last modified Amanda Wheeler RN (7/19/2017)   Next INR check 1/10/2018   Priority INR   Target end date     Indications   Long-term (current) use of anticoagulants [Z79.01] [Z79.01]  Aortic valve prosthesis present [Z95.2]  Atrial fibrillation (H) [I48.91]         Anticoagulation Episode Summary     INR check location     Preferred lab     Send INR reminders to [facility]    Comments       Anticoagulation Care Providers     Provider Role Specialty Phone number    Obdulio Ingram MD [facility] [phone]            See the Encounter Report to view Anticoagulation Flowsheet and Dosing Calendar (Go to Encounters tab in chart review, and find the Anticoagulation Therapy Visit)    Dosage adjustment made based on physician directed care plan.    Amanda Wheeler RN

## 2018-01-03 NOTE — MR AVS SNAPSHOT
Todd S Aschoff   1/3/2018 4:00 PM   Anticoagulation Therapy Visit    Description:  62 year old male   Provider[facility]  [facility]   Department:  [facility]           INR as of 1/3/2018     Today's INR 2.5      Anticoagulation Summary as of 1/3/2018     INR goal 2.5-3.5   Today's INR 2.5   Full instructions 1/3: 10 mg; Otherwise 5 mg every day   Next INR check 1/10/2018    Indications   Long-term (current) use of anticoagulants [Z79.01] [Z79.01]  Aortic valve prosthesis present [Z95.2]  Atrial fibrillation (H) [I48.91]         Your next Anticoagulation Clinic appointment(s)     Paul 10, 2018  3:45 PM CST   [facility]   [facility]    [address]  [facility] [address] [phone]              Contact Numbers     [facility] Phone Numbers:  Anticoagulation Clinic Appointments : [phone]  Anticoagulation Nurse: [phone]         January 2018 Details    Sun Mon Tue Wed Thu Fri Sat      1               2               3      10 mg   See details      4      5 mg         5      5 mg         6      5 mg           7      5 mg         8      5 mg         9      5 mg         10            11               12               13                 14               15               16               17               18               19               20                 21               22               23               24               25               26               27                 28               29               30               31                   Date Details   01/03 This INR check       Date of next INR:  1/10/2018         How to take your warfarin dose     To take:  5 mg Take 1 of the 5 mg tablets.    To take:  10 mg Take 2 of the 5 mg tablets.

## 2018-01-09 NOTE — TELEPHONE ENCOUNTER
[place] reviewed:  reviewed- no concerns    [place]  https://[place].[employer]/

## 2018-01-16 ENCOUNTER — ANTICOAGULATION THERAPY VISIT (OUTPATIENT)
Dept: ANTICOAGULATION | Facility: CLINIC | Age: 63
End: 2018-01-16
Payer: COMMERCIAL

## 2018-01-16 DIAGNOSIS — Z79.01 LONG-TERM (CURRENT) USE OF ANTICOAGULANTS: ICD-10-CM

## 2018-01-16 DIAGNOSIS — Z95.2 AORTIC VALVE PROSTHESIS PRESENT: ICD-10-CM

## 2018-01-16 LAB — INR POINT OF CARE: 2.3 (ref 0.86–1.14)

## 2018-01-16 PROCEDURE — 99207 ZZC NO CHARGE NURSE ONLY: CPT

## 2018-01-16 PROCEDURE — 85610 PROTHROMBIN TIME: CPT | Mod: QW

## 2018-01-16 PROCEDURE — 36416 COLLJ CAPILLARY BLOOD SPEC: CPT

## 2018-01-16 NOTE — MR AVS SNAPSHOT
Todd S Aschoff   1/16/2018 4:45 PM   Anticoagulation Therapy Visit    Description:  62 year old male   Provider[facility]   Department:  [facility]           INR as of 1/16/2018     Today's INR 2.3!      Anticoagulation Summary as of 1/16/2018     INR goal 2.5-3.5   Today's INR 2.3!   Full instructions 1/16: 7.5 mg; 1/18: 7.5 mg; 1/23: 7.5 mg; 1/25: 7.5 mg; Otherwise 5 mg every day   Next INR check 1/30/2018    Indications   Long-term (current) use of anticoagulants [Z79.01] [Z79.01]  Aortic valve prosthesis present [Z95.2]  Atrial fibrillation (H) [I48.91]         Your next Anticoagulation Clinic appointment(s)     Jan 30, 2018 11:15 AM CST   [facility] with [facility]   [facility]    [address] [facility] [address]  [facility] [address] [phone]              Contact Numbers     [facility] Phone Numbers:  Anticoagulation Clinic Appointments : [phone]  Anticoagulation Nurse: [phone]         January 2018 Details    Sun Mon Tue Wed Thu Fri Sat      1               2               3               4               5               6                 7               8               9               10               11               12               13                 14               15               16      7.5 mg   See details      17      5 mg         18      7.5 mg         19      5 mg         20      5 mg           21      5 mg         22      5 mg         23      7.5 mg         24      5 mg         25      7.5 mg         26      5 mg         27      5 mg           28      5 mg         29      5 mg         30            31                   Date Details   01/16 This INR check       Date of next INR:  1/30/2018         How to take your warfarin dose     To take:  5 mg Take 1 of the 5 mg tablets.    To take:  7.5 mg Take 1.5 of the 5 mg tablets.

## 2018-01-16 NOTE — PROGRESS NOTES
ANTICOAGULATION FOLLOW-UP CLINIC VISIT    Patient Name:  Todd S Aschoff  Date:  1/16/2018  Contact Type:  Face to Face    SUBJECTIVE:     Patient Findings     Positives Unexplained INR or factor level change           OBJECTIVE    INR Protime   Date Value Ref Range Status   01/16/2018 2.3 (A) 0.86 - 1.14 Final       ASSESSMENT / PLAN  INR assessment SUB    Recheck INR In: 2 WEEKS    INR Location Clinic      Anticoagulation Summary as of 1/16/2018     INR goal 2.5-3.5   Today's INR 2.3!   Maintenance plan 5 mg (5 mg x 1) every day   Full instructions 1/16: 7.5 mg; 1/18: 7.5 mg; 1/23: 7.5 mg; 1/25: 7.5 mg; Otherwise 5 mg every day   Weekly total 35 mg   Plan last modified Amanda Wheeler RN (7/19/2017)   Next INR check 1/30/2018   Priority INR   Target end date     Indications   Long-term (current) use of anticoagulants [Z79.01] [Z79.01]  Aortic valve prosthesis present [Z95.2]  Atrial fibrillation (H) [I48.91]         Anticoagulation Episode Summary     INR check location     Preferred lab     Send INR reminders to [facility]    Comments       Anticoagulation Care Providers     Provider Role Specialty Phone number    Obdulio Ingram MD Responsible Internal Medicine [phone]            See the Encounter Report to view Anticoagulation Flowsheet and Dosing Calendar (Go to Encounters tab in chart review, and find the Anticoagulation Therapy Visit)    Dosage adjustment made based on physician directed care plan.    Lucina Andrew RN

## 2018-01-30 ENCOUNTER — ANTICOAGULATION THERAPY VISIT (OUTPATIENT)
Dept: ANTICOAGULATION | Facility: CLINIC | Age: 63
End: 2018-01-30
Payer: COMMERCIAL

## 2018-01-30 DIAGNOSIS — Z79.01 LONG-TERM (CURRENT) USE OF ANTICOAGULANTS: ICD-10-CM

## 2018-01-30 DIAGNOSIS — Z95.2 AORTIC VALVE PROSTHESIS PRESENT: ICD-10-CM

## 2018-01-30 LAB — INR POINT OF CARE: 1.9 (ref 0.86–1.14)

## 2018-01-30 PROCEDURE — 85610 PROTHROMBIN TIME: CPT | Mod: QW

## 2018-01-30 PROCEDURE — 36416 COLLJ CAPILLARY BLOOD SPEC: CPT

## 2018-01-30 PROCEDURE — 99207 ZZC NO CHARGE NURSE ONLY: CPT

## 2018-01-30 NOTE — PROGRESS NOTES
ANTICOAGULATION FOLLOW-UP CLINIC VISIT    Patient Name:  Todd S Aschoff  Date:  1/30/2018  Contact Type:  Face to Face    SUBJECTIVE:     Patient Findings     Positives Unexplained INR or factor level change    Comments Pt denies missed doses or changes.            OBJECTIVE    INR Protime   Date Value Ref Range Status   01/30/2018 1.9 (A) 0.86 - 1.14 Final       ASSESSMENT / PLAN  INR assessment SUB    Recheck INR In: 6 DAYS    INR Location Clinic      Anticoagulation Summary as of 1/30/2018     INR goal 2.5-3.5   Today's INR 1.9!   Maintenance plan 5 mg (5 mg x 1) every day   Full instructions 1/30: 10 mg; 1/31: 10 mg; 2/1: 7.5 mg; 2/3: 7.5 mg; Otherwise 5 mg every day   Weekly total 35 mg   Plan last modified Amanda Wheeler RN (7/19/2017)   Next INR check 2/5/2018   Priority INR   Target end date     Indications   Long-term (current) use of anticoagulants [Z79.01] [Z79.01]  Aortic valve prosthesis present [Z95.2]  Atrial fibrillation (H) [I48.91]         Anticoagulation Episode Summary     INR check location     Preferred lab     Send INR reminders to [facility]    Comments       Anticoagulation Care Providers     Provider Role Specialty Phone number    Obdulio Ingram MD Responsible Internal Medicine [phone]            See the Encounter Report to view Anticoagulation Flowsheet and Dosing Calendar (Go to Encounters tab in chart review, and find the Anticoagulation Therapy Visit)    Dosage adjustment made based on physician directed care plan.    Lucina Andrew RN

## 2018-01-30 NOTE — MR AVS SNAPSHOT
Todd S Aschoff   1/30/2018 11:15 AM   Anticoagulation Therapy Visit    Description:  62 year old male   [facility]   Department:  [facility]           INR as of 1/30/2018     Today's INR 1.9!      Anticoagulation Summary as of 1/30/2018     INR goal 2.5-3.5   Today's INR 1.9!   Full instructions 1/30: 10 mg; 1/31: 10 mg; 2/1: 7.5 mg; 2/3: 7.5 mg; Otherwise 5 mg every day   Next INR check 2/5/2018    Indications   Long-term (current) use of anticoagulants [Z79.01] [Z79.01]  Aortic valve prosthesis present [Z95.2]  Atrial fibrillation (H) [I48.91]         Your next Anticoagulation Clinic appointment(s)     Feb 05, 2018  3:45 PM CST   [facility]   [facility]    [address] [facility] [address]  [facility] [id number] [phone]              Contact Numbers     [facility] Phone Numbers:  Anticoagulation Clinic Appointments : [phone]  Anticoagulation Nurse: [phone]         January 2018 Details    Sun Mon Tue Wed Thu Fri Sat      1               2               3               4               5               6                 7               8               9               10               11               12               13                 14               15               16               17               18               19               20                 21               22               23               24               25               26               27                 28               29               30      10 mg   See details      31      10 mg             Date Details   01/30 This INR check               How to take your warfarin dose     To take:  10 mg Take 2 of the 5 mg tablets.           February 2018 Details    Sun Mon Tue Wed Thu Fri Sat         1      7.5 mg         2      5 mg         3      7.5 mg           4      5 mg         5            6               7                8               9               10                 11               12               13               14               15               16               17                 18               19               20               21               22               23               24                 25               26               27               28                   Date Details   No additional details    Date of next INR:  2/5/2018         How to take your warfarin dose     To take:  5 mg Take 1 of the 5 mg tablets.    To take:  7.5 mg Take 1.5 of the 5 mg tablets.

## 2018-02-04 ENCOUNTER — APPOINTMENT (OUTPATIENT)
Dept: GENERAL RADIOLOGY | Facility: CLINIC | Age: 63
End: 2018-02-04
Attending: EMERGENCY MEDICINE
Payer: COMMERCIAL

## 2018-02-04 ENCOUNTER — HOSPITAL ENCOUNTER (EMERGENCY)
Facility: CLINIC | Age: 63
Discharge: HOME | End: 2018-02-04
Attending: EMERGENCY MEDICINE | Admitting: EMERGENCY MEDICINE
Payer: COMMERCIAL

## 2018-02-04 VITALS
RESPIRATION RATE: 20 BRPM | SYSTOLIC BLOOD PRESSURE: 121 MMHG | DIASTOLIC BLOOD PRESSURE: 87 MMHG | OXYGEN SATURATION: 98 % | TEMPERATURE: 98 F

## 2018-02-04 DIAGNOSIS — Z79.01 ANTICOAGULATED ON WARFARIN: ICD-10-CM

## 2018-02-04 DIAGNOSIS — S40.021A HEMATOMA OF ARM, RIGHT, INITIAL ENCOUNTER: ICD-10-CM

## 2018-02-04 LAB
HGB BLD-MCNC: 15.8 G/DL (ref 13.3–17.7)
INR PPP: 2.59 (ref 0.86–1.14)
PLATELET # BLD AUTO: 187 10E9/L (ref 150–450)

## 2018-02-04 PROCEDURE — 99284 EMERGENCY DEPT VISIT MOD MDM: CPT | Mod: 25

## 2018-02-04 PROCEDURE — 85049 AUTOMATED PLATELET COUNT: CPT | Performed by: EMERGENCY MEDICINE

## 2018-02-04 PROCEDURE — 73070 X-RAY EXAM OF ELBOW: CPT | Mod: RT

## 2018-02-04 PROCEDURE — 85018 HEMOGLOBIN: CPT | Performed by: EMERGENCY MEDICINE

## 2018-02-04 PROCEDURE — 85610 PROTHROMBIN TIME: CPT | Performed by: EMERGENCY MEDICINE

## 2018-02-04 PROCEDURE — 36415 COLL VENOUS BLD VENIPUNCTURE: CPT | Performed by: EMERGENCY MEDICINE

## 2018-02-04 ASSESSMENT — ENCOUNTER SYMPTOMS
WEAKNESS: 0
NUMBNESS: 0
COLOR CHANGE: 1

## 2018-02-04 NOTE — DISCHARGE INSTRUCTIONS
Bruises (Contusions)    A contusion is a bruise. A bruise happens when a blow to your body doesn't break the skin but does break blood vessels beneath the skin. Blood leaking from the broken vessels causes redness and swelling. As it heals, your bruise is likely to turn colors like purple, green, and yellow. This is normal. The bruise should fade in 2 or 3 weeks.  Factors that make you more likely to bruise  Almost everyone bruises now and then. Certain people do bruise more easily than others. You're more prone to bruising as you get older. That's because blood vessels become more fragile with age. You're also more likely to bruise if you have a clotting disorder such as hemophilia or take medications that reduce clotting, including aspirin, coumadin, newer agents.  When to go to the emergency room (ER)  Bruises almost always heal on their own without special treatment. But for some people, a bad bruise can be serious. Seek medical care if you:    Have a clotting disorder such as hemophilia.    Have cirrhosis or other serious liver disease.    Take blood-thinning medications such as warfarin (Coumadin).  What to expect in the ER  A doctor will examine your bruise and ask about any health conditions you have. In some cases, you may have a test to check how well your blood clots. Other treatment will depend on your needs.  Follow-up care  Sometimes a bruise gets worse instead of better. It may become larger and more swollen. This can occur when your body walls off a small pool of blood under the skin (hematoma). In very rare cases, your doctor may need to drain excess blood from the area.  Tip:  Apply an ice pack or bag of frozen peas to a bruise (keep a thin cloth between the cold source and your skin). This can help reduce redness and swelling.   Date Last Reviewed: 12/1/2016 2000-2017 [employer]. [address] [facility], [place] [address]. All rights reserved. This information is not intended as  a substitute for professional medical care. Always follow your healthcare professional's instructions.

## 2018-02-04 NOTE — ED NOTES
Patient presents after fall on Friday night onto a bookcase. Patient is on Coumadin and hit his right arm. Patient has large bruising/hematoma to right upper arm.  Patient has higher dosing of Coumadin this week due to prostate biopsy recently. Patient states 8/10 pain. ABCDs intact, alert and oriented x 4.

## 2018-02-04 NOTE — ED AVS SNAPSHOT
[facility]    [address]    [facility] [address]    Phone:  [phone]    Fax:  [phone]                                       Todd S Aschoff   MRN: [id number]    Department:  [facility]   Date of Visit:  2/4/2018           After Visit Summary Signature Page     I have received my discharge instructions, and my questions have been answered. I have discussed any challenges I see with this plan with the nurse or doctor.    ..........................................................................................................................................  Patient/Patient Representative Signature      ..........................................................................................................................................  Patient Representative Print Name and Relationship to Patient    ..................................................               ................................................  Date                                            Time    ..........................................................................................................................................  Reviewed by Signature/Title    ...................................................              ..............................................  Date                                                            Time

## 2018-02-04 NOTE — ED PROVIDER NOTES
History     Chief Complaint:  Arm Pain    HPI   Todd S Aschoff is a 62 year old male with a history of hyperlipidemia, aortic dissection, atrial fibrillation,  on Coumadin who presents to the Emergency Department for evaluation of arm pain. The patient suffered a mechanical fall one week ago, landing on his right arm. Of note, he reports being on high doses of Coumadin over the past week and a half. On presentation the patient notes a bruise to his right lateral elbow and states he has been applying cold compresses to this area. He complains of pain with terminal flexion of his elbow but otherwiseminimal pain. . No pain with extension. He denies any numbness, tingling or weakness. No other associated injuries.     Allergies:  No known drug allergies     Medications:    azelastine (ASTELIN) 0.1 % spray  diazepam (VALIUM) 5 MG tablet  trazodone (DESYREL) 50 MG tablet  cetirizine (ZYRTEC) 10 MG tablet  warfarin (COUMADIN) 5 MG tablet  donepezil (ARICEPT) 10 MG tablet  sulfamethoxazole-trimethoprim (BACTRIM/SEPTRA) 400-80 MG per tablet  enoxaparin (LOVENOX) 120 MG/0.8ML injection  mirabegron (MYRBETRIQ) 50 MG 24 hr tablet  valacyclovir (VALTREX) 1000 mg tablet  MAPAP ARTHRITIS PAIN 650 MG CR tablet  levothyroxine (SYNTHROID/LEVOTHROID) 150 MCG tablet  propafenone (RYTHMOL) 150 MG TABS tablet  cyclobenzaprine (FLEXERIL) 10 MG tablet  simvastatin (ZOCOR) 40 MG tablet  enoxaparin ) 120 MG/0.8ML injection  isometheptene-acetaminophen-dichloralphenazone (MIDRIN) -100 MG per capsule  senna-docusate (SENOKOT-S;AMERICA COLACE) 8.6-50 MG per tablet  venlafaxine (EFFEXOR-ER) 150 MG TB24  guanfacine (TENEX) 1 MG tablet  Acetaminophen  8 HOUR) 650 MG TABS     Past Medical History:    Alcohol dependence (H)   Allergy, unspecified not elsewhere classified   Antiplatelet or antithrombotic long-term use   Aortic valve prosthesis present   Atrial fibrillation (H)   Blood transfusion   BPH (benign prostatic hypertrophy)   Chronic  infection   Chronic pain   Coagulation disorder (H)   Dissection of aorta, thoracic (H)   Headache(784.0)   Heart murmur   Thoracic aneurysm  Low back pain   Major depression   Mixed hyperlipidemia   Numbness and tingling   OA (osteoarthritis)   Obesity   Overactive bladder  Prostate infection   Sciatica   Hypothyroidism     Past Surgical History:    Aortic valve replacement  Repair of TAA with graft  Cholecystectomy  Total hip arthroscopy  Cataract   Bondurant teeth removal  Tonsillectomy  Removal of cyst  Colonoscopy  Irrigation and debridement  Fusion cervical anterior   Explore spine, remove hardware  Decompression lumbar     Family History:    Sleep apnea: brother  HTN: sister, brother  Cancer: mother, father  Cerebrovascular disease: father    Social History:  Smoking Status: never smoker  Smokeless Tobacco: never used  Alcohol Use: no  Marital Status:   [2]     Review of Systems   Musculoskeletal:        Pain with flexion of right elbow   Skin: Positive for color change.   Neurological: Negative for weakness and numbness.   All other systems reviewed and are negative.    Physical Exam   First Vitals:  BP: 121/87  Heart Rate: 72  Temp: 98  F (36.7  C)  Resp: 20  SpO2: 98 %    Physical Exam  General: Patient is alert and interactive when I enter the room  Head:  The scalp, face, and head appear normal  Eyes:  The pupils are equal, round, and reactive to light    Conjunctivae and sclerae are normal  ENT:    External acoustic canals are normal    The oropharynx is normal without erythema.     Uvula is in the midline  Neck:  Normal range of motion  CV:  Regular rate. S1/S2. No murmurs.   Resp:  Lungs are clear without wheezes or rales. No distress  GI:  Abdomen is soft, no rigidity, guarding, or rebound    No distension. No tenderness to palpation in any quadrant.     MS:  Normal tone. No asymmetric leg swelling, calf or thigh tenderness.      Right arm: The compartments are soft. No tingling in the fingers.  Strong  strength. Mild pain with elbow flexion. Capillary refill finger snormal.  Skin:  No rash or lesions noted. Normal capillary refill noted. Bruise on right lateral aspect of the elbow. Hematoma is present by clinical exam.  Neuro:  Speech is normal and fluent. Face is symmetric.     Moving all extremities well.   Psych:  Awake. Alert.  Normal affect.  Appropriate interactions.  Lymph: No anterior cervical lymphadenopathy noted    Emergency Department Course     Imaging:  Radiographic findings were communicated with the patient who voiced understanding of the findings.    Right elbow XR:  Normal.  As per radiology.     Laboratory:  INR: 2.59(H)  Hemoglobin: 15.8  Platelet count: 187    Emergency Department Course:  Nursing notes and vitals reviewed. I performed an exam of the patient as documented above.     Blood drawn. This was sent to the lab for further testing, results above.    The patient was sent for a right elbow xray while here in the emergency department, findings above.    1523 I reassessed the patient.     The patient was given a sling. This was checked by myself to ensure proper positioning. The patient felt more comfortable with sling in place.    Findings and plan explained to the Patient. Patient discharged home with instructions regarding supportive care, medications, and reasons to return. The importance of close follow-up was reviewed.     Impression & Plan      Medical Decision Making:  Todd S Aschoff is a 62 year old male on Coumadin who presents with a right lateral elbow hematoma after a mechanical fall .  Signs and symptoms are consistent with a contusion.  A broad differential was considered including sprain, strain, fracture, tendon rupture, nerve impingement/compromise, referred pain.  Xray obtained and unremarkable. Supportive outpatient management is indicated.  Rest, ice, and elevation treatment was discussed with the patient. The patients head to toe trauma exam is otherwise  negative for serious underlying disease of the head, neck, chest, abdomen, extremities, pelvis. No signs of compartment syndrome. Close follow-up with patient's primary care physician per discharge precautions. Contusion discharge instructions given for home.     Diagnosis:    ICD-10-CM    1. Hematoma of arm, right, initial encounter S40.021A    2. Anticoagulated on warfarin Z79.01      Disposition:  discharged to home    I, Romina Tellez, am serving as a scribe on 2/4/2018 at 1:46 PM to personally document services performed by Tarun Monique MD based on my observations and the provider's statements to me.   Romina Tellez  2/4/2018   [facility] EMERGENCY DEPARTMENT       Tarun Monique MD  02/04/18 0349

## 2018-02-04 NOTE — ED AVS SNAPSHOT
[facility]    [address]    [facility] [address]    Phone:  [phone]    Fax:  [phone]                                       Todd S Aschoff   MRN: [id number]    Department:  [facility] Emergency Department   Date of Visit:  2/4/2018           Patient Information     Date Of Birth          1955        Your diagnoses for this visit were:     Hematoma of arm, right, initial encounter     Anticoagulated on warfarin        You were seen by Tarun Monique MD.      Follow-up Information     Follow up with Obdulio Ingram MD.    Specialty:  Internal Medicine    Why:  If symptoms worsen    Contact information:    [address]  [facility] [address]  [phone]          Discharge Instructions         Bruises (Contusions)    A contusion is a bruise. A bruise happens when a blow to your body doesn't break the skin but does break blood vessels beneath the skin. Blood leaking from the broken vessels causes redness and swelling. As it heals, your bruise is likely to turn colors like purple, green, and yellow. This is normal. The bruise should fade in 2 or 3 weeks.  Factors that make you more likely to bruise  Almost everyone bruises now and then. Certain people do bruise more easily than others. You're more prone to bruising as you get older. That's because blood vessels become more fragile with age. You're also more likely to bruise if you have a clotting disorder such as hemophilia or take medications that reduce clotting, including aspirin, coumadin, newer agents.  When to go to the emergency room (ER)  Bruises almost always heal on their own without special treatment. But for some people, a bad bruise can be serious. Seek medical care if you:    Have a clotting disorder such as hemophilia.    Have cirrhosis or other serious liver disease.    Take blood-thinning medications such as warfarin (Coumadin).  What to expect in the ER  A  doctor will examine your bruise and ask about any health conditions you have. In some cases, you may have a test to check how well your blood clots. Other treatment will depend on your needs.  Follow-up care  Sometimes a bruise gets worse instead of better. It may become larger and more swollen. This can occur when your body walls off a small pool of blood under the skin (hematoma). In very rare cases, your doctor may need to drain excess blood from the area.  Tip:  Apply an ice pack or bag of frozen peas to a bruise (keep a thin cloth between the cold source and your skin). This can help reduce redness and swelling.   Date Last Reviewed: 12/1/2016 2000-2017 [employer]. [address] [place] [address], [place] [address]. All rights reserved. This information is not intended as a substitute for professional medical care. Always follow your healthcare professional's instructions.          Future Appointments        Provider Department Dept Phone Center    2/5/2018 3:45 PM [facility] [phone] [place]    6/14/2018 8:00 AM Geraldo Gonzales MD [facility] [place] [phone] [facility]      24 Hour Appointment Hotline       To make an appointment at any [facility], call [phone] ([phone]). If you don't have a family doctor or clinic, we will help you find one. [facility] are conveniently located to serve the needs of you and your family.             Review of your medicines      Our records show that you are taking the medicines listed below. If these are incorrect, please call your family doctor or clinic.        Dose / Directions Last dose taken    * acetaminophen 650 MG 8 hour tablet   Commonly known as:  ACETAMINOPHEN 8 HOUR   Dose:  650 mg   Quantity:  250 tablet        Take 650 mg by mouth 2 times daily   Refills:  3        * MAPAP ARTHRITIS PAIN 650 MG CR tablet   Quantity:  180 tablet   Generic  drug:  acetaminophen        TAKE 650 MG BY MOUTH 2 TIMES DAILY   Refills:  2        azelastine 0.1 % spray   Commonly known as:  ASTELIN   Quantity:  30 mL        USE 2 SPRAYS IN NOSTRIL 2 TIMES DAILY AS NEEDED.   Refills:  0        cetirizine 10 MG tablet   Commonly known as:  zyrTEC   Quantity:  30 tablet        TAKE 1 TABLET BY MOUTH ONCE DAILY AS NEEDED.   Refills:  3        cyclobenzaprine 10 MG tablet   Commonly known as:  FLEXERIL   Dose:  10 mg   Quantity:  270 tablet        Take 1 tablet (10 mg) by mouth 3 times daily as needed for muscle spasms   Refills:  3        diazepam 5 MG tablet   Commonly known as:  VALIUM   Quantity:  270 tablet        TAKE 1 TABLET BY MOUTH EVERY 8 HOURS AS NEEDED FOR ANXIETY OR MUSCLE SPASMS.   Refills:  0        donepezil 10 MG tablet   Commonly known as:  ARICEPT   Dose:  10 mg   Quantity:  90 tablet        Take 1 tablet (10 mg) by mouth At Bedtime   Refills:  1        * enoxaparin 120 MG/0.8ML injection   Commonly known as:  LOVENOX   Dose:  1 mg/kg   Quantity:  20 Syringe        Inject 0.9 mLs (135 mg) Subcutaneous every 12 hours   Refills:  1        * enoxaparin 120 MG/0.8ML injection   Commonly known as:  LOVENOX   Dose:  1 mg/kg   Quantity:  20 Syringe        Inject 0.8 mLs (120 mg) Subcutaneous daily   Refills:  1        guanFACINE 1 MG tablet   Commonly known as:  TENEX   Dose:  1 mg   Quantity:  90 tablet        Take 1 tablet (1 mg) by mouth At Bedtime   Refills:  3        isometheptene-acetaminophen-dichloralphenazone -100 MG per capsule   Commonly known as:  MIDRIN   Dose:  1 capsule   Quantity:  30 capsule        Take 1 capsule by mouth 4 times daily as needed   Refills:  0        levothyroxine 150 MCG tablet   Commonly known as:  SYNTHROID/LEVOTHROID   Quantity:  90 tablet        TAKE ONE TABLET BY MOUTH ONE TIME DAILY   Refills:  3        mirabegron 50 MG 24 hr tablet   Commonly known as:  MYRBETRIQ   Dose:  50 mg   Quantity:  90 tablet        Take 1 tablet  (50 mg) by mouth daily   Refills:  3        propafenone 150 MG Tabs tablet   Commonly known as:  RYTHMOL   Dose:  150 mg   Quantity:  270 tablet        Take 1 tablet (150 mg) by mouth every 8 hours   Refills:  3        senna-docusate 8.6-50 MG per tablet   Commonly known as:  SENOKOT-S;PERICOLACE   Dose:  1 tablet   Quantity:  60 tablet        Take 1 tablet by mouth 2 times daily as needed for constipation   Refills:  11        simvastatin 40 MG tablet   Commonly known as:  ZOCOR   Dose:  40 mg   Quantity:  90 tablet        Take 1 tablet (40 mg) by mouth At Bedtime   Refills:  3        sulfamethoxazole-trimethoprim 400-80 MG per tablet   Commonly known as:  BACTRIM/SEPTRA   Dose:  1 tablet   Quantity:  6 tablet        Take 1 tablet by mouth every 12 hours Take one tab every 12 hours , starting day before procedure   Refills:  0        traZODone 50 MG tablet   Commonly known as:  DESYREL   Dose:  50 mg   Quantity:  90 tablet        Take 1 tablet (50 mg) by mouth At Bedtime   Refills:  2        valACYclovir 1000 mg tablet   Commonly known as:  VALTREX   Dose:  1000 mg   Quantity:  21 tablet        Take 1 tablet (1,000 mg) by mouth 3 times daily   Refills:  0        venlafaxine 150 MG Tb24 24 hr tablet   Commonly known as:  EFFEXOR-ER   Dose:  150 mg   Quantity:  90 each        Take 1 tablet (150 mg) by mouth daily (with breakfast)   Refills:  3        warfarin 5 MG tablet   Commonly known as:  COUMADIN   Dose:  5 mg   Quantity:  100 tablet        Take 1 tablet (5 mg) by mouth daily or as intructed by INR clinic   Refills:  1        * Notice:  This list has 4 medication(s) that are the same as other medications prescribed for you. Read the directions carefully, and ask your doctor or other care provider to review them with you.            Procedures and tests performed during your visit     Elbow XR, 2 views, right    Hemoglobin    INR    Platelet count      Orders Needing Specimen Collection     None      Pending  Results     Date and Time Order Name Status Description    2/4/2018 1358 Elbow XR, 2 views, right In process             Pending Culture Results     No orders found from 2/2/2018 to 2/5/2018.            Pending Results Instructions     If you had any lab results that were not finalized at the time of your Discharge, you can call the ED Lab Result RN at [phone]. You will be contacted by this team for any positive Lab results or changes in treatment. The nurses are available 7 days a week from 10A to 6:30P.  You can leave a message 24 hours per day and they will return your call.        Test Results From Your Hospital Stay        2/4/2018  3:13 PM      Result not yet available     Exam Ended         2/4/2018  2:39 PM      Component Results     Component Value Ref Range & Units Status    INR 2.59 (H) 0.86 - 1.14 Final         2/4/2018  2:38 PM      Component Results     Component Value Ref Range & Units Status    Hemoglobin 15.8 13.3 - 17.7 g/dL Final         2/4/2018  3:08 PM      Component Results     Component Value Ref Range & Units Status    Platelet Count 187 150 - 450 10e9/L Final    Platelets clumped                Clinical Quality Measure: Blood Pressure Screening     Your blood pressure was checked while you were in the emergency department today. The last reading we obtained was  BP: 121/87 . Please read the guidelines below about what these numbers mean and what you should do about them.  If your systolic blood pressure (the top number) is less than 120 and your diastolic blood pressure (the bottom number) is less than 80, then your blood pressure is normal. There is nothing more that you need to do about it.  If your systolic blood pressure (the top number) is 120-139 or your diastolic blood pressure (the bottom number) is 80-89, your blood pressure may be higher than it should be. You should have your blood pressure rechecked within a year by a primary care provider.  If your systolic blood pressure  "(the top number) is 140 or greater or your diastolic blood pressure (the bottom number) is 90 or greater, you may have high blood pressure. High blood pressure is treatable, but if left untreated over time it can put you at risk for heart attack, stroke, or kidney failure. You should have your blood pressure rechecked by a primary care provider within the next 4 weeks.  If your provider in the emergency department today gave you specific instructions to follow-up with your doctor or provider even sooner than that, you should follow that instruction and not wait for up to 4 weeks for your follow-up visit.        Thank you for choosing [facility]       Thank you for choosing [facility] for your care. Our goal is always to provide you with excellent care. Hearing back from our patients is one way we can continue to improve our services. Please take a few minutes to complete the written survey that you may receive in the mail after you visit with us. Thank you!        [employer] Information     [facility] lets you send messages to your doctor, view your test results, renew your prescriptions, schedule appointments and more. To sign up, go to [email or web address] . Click on \"Log in\" on the left side of the screen, which will take you to the Welcome page. Then click on \"Sign up Now\" on the right side of the page.     You will be asked to enter the access code listed below, as well as some personal information. Please follow the directions to create your username and password.     Your access code is: [email or web address]  Expires: 3/4/2018 10:08 AM     Your access code will  in 90 days. If you need help or a new code, please call your [facility] clinic or [phone].        Care EveryWhere ID     This is your Care EveryWhere ID. This could be used by other organizations to access your [facility] medical records  [id number]        Equal Access to Services     PERRI WALTON : sumaya Zacarias qaybta " sharonda nelson ah. So [facility] [phone].    ATENCIÓN: Si habla español, tiene a thomas disposición servicios gratuitos de asistencia lingüística. Llame al [phone].    We comply with applicable federal civil rights laws and [place] laws. We do not discriminate on the basis of race, color, national origin, age, disability, sex, sexual orientation, or gender identity.            After Visit Summary       This is your record. Keep this with you and show to your community pharmacist(s) and doctor(s) at your next visit.

## 2018-02-06 ENCOUNTER — TELEPHONE (OUTPATIENT)
Dept: INTERNAL MEDICINE | Facility: CLINIC | Age: 63
End: 2018-02-06

## 2018-02-06 ENCOUNTER — HOSPITAL ENCOUNTER (EMERGENCY)
Facility: CLINIC | Age: 63
Discharge: HOME | End: 2018-02-06
Attending: EMERGENCY MEDICINE | Admitting: EMERGENCY MEDICINE
Payer: COMMERCIAL

## 2018-02-06 ENCOUNTER — APPOINTMENT (OUTPATIENT)
Dept: ULTRASOUND IMAGING | Facility: CLINIC | Age: 63
End: 2018-02-06
Attending: EMERGENCY MEDICINE
Payer: COMMERCIAL

## 2018-02-06 VITALS
SYSTOLIC BLOOD PRESSURE: 134 MMHG | OXYGEN SATURATION: 94 % | RESPIRATION RATE: 18 BRPM | WEIGHT: 298.72 LBS | DIASTOLIC BLOOD PRESSURE: 86 MMHG | HEART RATE: 68 BPM | TEMPERATURE: 97.8 F | BODY MASS INDEX: 38.88 KG/M2

## 2018-02-06 DIAGNOSIS — Z79.01 CHRONIC ANTICOAGULATION: ICD-10-CM

## 2018-02-06 DIAGNOSIS — T14.8XXA HEMATOMA: ICD-10-CM

## 2018-02-06 LAB
HGB BLD-MCNC: 15.3 G/DL (ref 13.3–17.7)
INR PPP: 2.68 (ref 0.86–1.14)

## 2018-02-06 PROCEDURE — 99284 EMERGENCY DEPT VISIT MOD MDM: CPT | Mod: 25

## 2018-02-06 PROCEDURE — 85018 HEMOGLOBIN: CPT | Performed by: EMERGENCY MEDICINE

## 2018-02-06 PROCEDURE — 36415 COLL VENOUS BLD VENIPUNCTURE: CPT | Performed by: EMERGENCY MEDICINE

## 2018-02-06 PROCEDURE — 93971 EXTREMITY STUDY: CPT | Mod: RT

## 2018-02-06 PROCEDURE — 85610 PROTHROMBIN TIME: CPT | Performed by: EMERGENCY MEDICINE

## 2018-02-06 ASSESSMENT — ENCOUNTER SYMPTOMS
CHILLS: 0
FEVER: 0
COLOR CHANGE: 1
WOUND: 1
DIZZINESS: 1
SHORTNESS OF BREATH: 0

## 2018-02-06 NOTE — DISCHARGE INSTRUCTIONS
Follow-up:  Please follow-up with your primary care provider in 2-3 days for re-evaluation and discussion of your visit to the emergency department today.    Home treatments:  Recommended home therapies include ice, elevation, tylenol for pain, and ACE bandage as needed compression.  You do not need to wear this all the time, and do not need to wear at night.    New prescriptions:  None    Return precautions:  Warning signs which should prompt you to return to the ER include worsening pain, swelling, redness, fevers, or any other new or troubling symptoms.  We are always happy to see you again.      Diet Guidelines for Patients Taking Warfarin (Coumadin)  The foods you eat and drink can affect how your medicine works. Here is what you should know about warfarin and your diet.  Vitamin K in foods  While you are on warfarin, your intake of vitamin K should stay the same from day to day. Your warfarin dose is based on your daily intake of vitamin K foods. So it is important to get the same amount of vitamin K each day.  Warfarin helps to thin your blood. Vitamin K helps to clot your blood. When there is a sudden increase or decrease in vitamin K intake, the warfarin may not work as well.  If you ate vitamin K foods before starting warfarin, you should continue to eat them. If you plan to eat more or less of the vitamin K foods, you must see your doctor. Your doctor will change your dose to match your vitamin K intake.   Dietary supplements  Some dietary supplements have vitamin K. We do not know how these interact with warfarin. To be safe, don't take dietary supplements (including your daily multi-vitamin) unless your doctor approves.  Vitamin E and fish oil are often taken by people with heart problems. Both of these have blood-thinning effects. If you take these, be sure to tell your doctor.  These foods are high in vitamin K   (serving size is   cup) These foods are medium-high in Vitamin K  (serving size is 1 cup,  except brussels sprouts =   cup)   Collards (cooked) McLean sprouts (cooked)   Kale (cooked) Broccoli   Mustard greens (cooked) Endive (raw)   Parsley (raw) Green leaf lettuce   Spinach (cooked) Landen lettuce   Swiss chard (cooked) Spinach (raw)   Turnip greens (cooked) Turnip greens (raw)   Alcohol   If you choose to drink, have no more than 1 to 2 drinks in 24 hours. One drink equals:     5 ounces of wine    12 ounces of beer    1 1/2 ounces of hard liquor  Drinking too much alcohol will increase your risk for bleeding. Ask your doctor how much alcohol is safe for you. Some doctors advise no alcohol while taking warfarin.  Cranberry juice  You may wish to limit how much cranberry juice you drink each day.  The makers of warfarin state that cranberry juice may increase your risk of bleeding. Studies do not support this. If you have questions about cranberry juice, please speak with your doctor or pharmacist.  Green tea  Green tea is often found on lists of foods that are high in Vitamin K. The tea leaves themselves are high in Vitamin K, but the tea provides only a small amount. You can drink a couple cups of green tea--just do not drink gallons of it.  Resources  American Dietetic Association. ADA Nutrition Care Manual. Available at http://nutritioncaremanual.org. Accessed 2005.  [employer] Patient's Guide to Using Coumadin, [place], [place]: 2003.  National [place] of Health. Drug-nutrient interactions: Coumadin (warfarin) and vitamin K. Last updated: December 2003. Accessed October 2006.  USDA, Human Nutrition Information Service. Provisional table on the vitamin K content of foods. HNIS/PT-104. Revised 1994.  For informational purposes only. Not to replace the advice of your health care provider.   Copyright   2006 [facility]. All rights reserved. [employer] 968488 - REV 09/15.

## 2018-02-06 NOTE — ED NOTES
Here for a recheck of a mass on the upper right arm. Was seen here on Sunday and was given a ace wrap and no antibiotics Had a small area that opened since that visit and now there is a redness to the lower forearm and swelling to the lower forearm and the hand . Pain continues   Patient is on coumadin Last INR was Sunday and the results were 2.59

## 2018-02-06 NOTE — ED AVS SNAPSHOT
[facility]    [address]    [facility] [address]    Phone:  [phone]    Fax:  [phone]                                       Todd S Aschoff   MRN: [id number]    Department:  [facility]   Date of Visit:  2/6/2018           After Visit Summary Signature Page     I have received my discharge instructions, and my questions have been answered. I have discussed any challenges I see with this plan with the nurse or doctor.    ..........................................................................................................................................  Patient/Patient Representative Signature      ..........................................................................................................................................  Patient Representative Print Name and Relationship to Patient    ..................................................               ................................................  Date                                            Time    ..........................................................................................................................................  Reviewed by Signature/Title    ...................................................              ..............................................  Date                                                            Time

## 2018-02-06 NOTE — TELEPHONE ENCOUNTER
Patient calling stating Ace bandage wrapped above and below the elbow, all of that is now bruised and bruising on underside of forearm. Forearm and hand are now swollen. Advised to seek emergency care. Patient is on anticoagulation therapy. Patient verbalizes understanding, agrees to plan of care, will go to the ED, and has no further questions.

## 2018-02-06 NOTE — ED AVS SNAPSHOT
[facility] Emergency Department    [address]    [facility] [address]    Phone:  [phone]    Fax:  [phone]                                       Todd S Aschoff   MRN: [id number]    Department:  [facility] Emergency Department   Date of Visit:  2/6/2018           Patient Information     Date Of Birth          1955        Your diagnoses for this visit were:     Hematoma     Chronic anticoagulation        You were seen by Jamshid Gottlieb MD.      Follow-up Information     Follow up with Obdulio Ingram MD. Schedule an appointment as soon as possible for a visit in 2 days.    Specialty:  Internal Medicine    Contact information:    [address]  [facility] [address]  [phone]          Follow up with [facility] Emergency Department.    Specialty:  EMERGENCY MEDICINE    Why:  If symptoms worsen    Contact information:    [address]  [facility] [id number] [phone]        Discharge Instructions       Follow-up:  Please follow-up with your primary care provider in 2-3 days for re-evaluation and discussion of your visit to the emergency department today.    Home treatments:  Recommended home therapies include ice, elevation, tylenol for pain, and ACE bandage as needed compression.  You do not need to wear this all the time, and do not need to wear at night.    New prescriptions:  None    Return precautions:  Warning signs which should prompt you to return to the ER include worsening pain, swelling, redness, fevers, or any other new or troubling symptoms.  We are always happy to see you again.      Diet Guidelines for Patients Taking Warfarin (Coumadin)  The foods you eat and drink can affect how your medicine works. Here is what you should know about warfarin and your diet.  Vitamin K in foods  While you are on warfarin, your intake of vitamin K should stay the same from day to day. Your warfarin dose is based on  your daily intake of vitamin K foods. So it is important to get the same amount of vitamin K each day.  Warfarin helps to thin your blood. Vitamin K helps to clot your blood. When there is a sudden increase or decrease in vitamin K intake, the warfarin may not work as well.  If you ate vitamin K foods before starting warfarin, you should continue to eat them. If you plan to eat more or less of the vitamin K foods, you must see your doctor. Your doctor will change your dose to match your vitamin K intake.   Dietary supplements  Some dietary supplements have vitamin K. We do not know how these interact with warfarin. To be safe, don't take dietary supplements (including your daily multi-vitamin) unless your doctor approves.  Vitamin E and fish oil are often taken by people with heart problems. Both of these have blood-thinning effects. If you take these, be sure to tell your doctor.  These foods are high in vitamin K   (serving size is   cup) These foods are medium-high in Vitamin K  (serving size is 1 cup, except brussels sprouts =   cup)   Collards (cooked) Hill sprouts (cooked)   Kale (cooked) Broccoli   Mustard greens (cooked) Endive (raw)   Parsley (raw) Green leaf lettuce   Spinach (cooked) Landen lettuce   Swiss chard (cooked) Spinach (raw)   Turnip greens (cooked) Turnip greens (raw)   Alcohol   If you choose to drink, have no more than 1 to 2 drinks in 24 hours. One drink equals:     5 ounces of wine    12 ounces of beer    1 1/2 ounces of hard liquor  Drinking too much alcohol will increase your risk for bleeding. Ask your doctor how much alcohol is safe for you. Some doctors advise no alcohol while taking warfarin.  Cranberry juice  You may wish to limit how much cranberry juice you drink each day.  The makers of warfarin state that cranberry juice may increase your risk of bleeding. Studies do not support this. If you have questions about cranberry juice, please speak with your doctor or  pharmacist.  Green tea  Green tea is often found on lists of foods that are high in Vitamin K. The tea leaves themselves are high in Vitamin K, but the tea provides only a small amount. You can drink a couple cups of green tea--just do not drink gallons of it.  Resources  American Dietetic Association. ADA Nutrition Care Manual. Available at http://nutritioncaremanual.org. Accessed 2005.  [employer] Patient's Guide to Using Coumadin, [place], [place]: 2003.  National [employer] of [employer]. Drug-nutrient interactions: Coumadin (warfarin) and vitamin K. Last updated: December 2003. Accessed October 2006.  [place], Human Nutrition Information Service. Provisional table on the vitamin K content of foods. HNIS/PT-104. Revised 1994.  For informational purposes only. Not to replace the advice of your health care provider.   Copyright   2006 [facility]. All rights reserved. [employer] 433755 - REV 09/15.          Future Appointments        Provider Department Dept Phone Center    2/8/2018 8:15 AM [facility] [phone] [place]    6/14/2018 8:00 AM Geraldo Gonzales MD [facility] [place] [phone] Formerly [facility]      24 Hour Appointment Hotline       To make an appointment at any [facility], call [phone] ([phone]). If you don't have a family doctor or clinic, we will help you find one. [facility] are conveniently located to serve the needs of you and your family.             Review of your medicines      Our records show that you are taking the medicines listed below. If these are incorrect, please call your family doctor or clinic.        Dose / Directions Last dose taken    * acetaminophen 650 MG 8 hour tablet   Commonly known as:  ACETAMINOPHEN 8 HOUR   Dose:  650 mg   Quantity:  250 tablet        Take 650 mg by mouth 2 times daily   Refills:  3        * MAPAP ARTHRITIS PAIN 650 MG CR tablet    Quantity:  180 tablet   Generic drug:  acetaminophen        TAKE 650 MG BY MOUTH 2 TIMES DAILY   Refills:  2        azelastine 0.1 % spray   Commonly known as:  ASTELIN   Quantity:  30 mL        USE 2 SPRAYS IN NOSTRIL 2 TIMES DAILY AS NEEDED.   Refills:  0        cetirizine 10 MG tablet   Commonly known as:  zyrTEC   Quantity:  30 tablet        TAKE 1 TABLET BY MOUTH ONCE DAILY AS NEEDED.   Refills:  3        cyclobenzaprine 10 MG tablet   Commonly known as:  FLEXERIL   Dose:  10 mg   Quantity:  270 tablet        Take 1 tablet (10 mg) by mouth 3 times daily as needed for muscle spasms   Refills:  3        diazepam 5 MG tablet   Commonly known as:  VALIUM   Quantity:  270 tablet        TAKE 1 TABLET BY MOUTH EVERY 8 HOURS AS NEEDED FOR ANXIETY OR MUSCLE SPASMS.   Refills:  0        donepezil 10 MG tablet   Commonly known as:  ARICEPT   Dose:  10 mg   Quantity:  90 tablet        Take 1 tablet (10 mg) by mouth At Bedtime   Refills:  1        * enoxaparin 120 MG/0.8ML injection   Commonly known as:  LOVENOX   Dose:  1 mg/kg   Quantity:  20 Syringe        Inject 0.9 mLs (135 mg) Subcutaneous every 12 hours   Refills:  1        * enoxaparin 120 MG/0.8ML injection   Commonly known as:  LOVENOX   Dose:  1 mg/kg   Quantity:  20 Syringe        Inject 0.8 mLs (120 mg) Subcutaneous daily   Refills:  1        guanFACINE 1 MG tablet   Commonly known as:  TENEX   Dose:  1 mg   Quantity:  90 tablet        Take 1 tablet (1 mg) by mouth At Bedtime   Refills:  3        isometheptene-acetaminophen-dichloralphenazone -100 MG per capsule   Commonly known as:  MIDRIN   Dose:  1 capsule   Quantity:  30 capsule        Take 1 capsule by mouth 4 times daily as needed   Refills:  0        levothyroxine 150 MCG tablet   Commonly known as:  SYNTHROID/LEVOTHROID   Quantity:  90 tablet        TAKE ONE TABLET BY MOUTH ONE TIME DAILY   Refills:  3        mirabegron 50 MG 24 hr tablet   Commonly known as:  MYRBETRIQ   Dose:  50 mg    Quantity:  90 tablet        Take 1 tablet (50 mg) by mouth daily   Refills:  3        NEURONTIN PO        Refills:  0        propafenone 150 MG Tabs tablet   Commonly known as:  RYTHMOL   Dose:  150 mg   Quantity:  270 tablet        Take 1 tablet (150 mg) by mouth every 8 hours   Refills:  3        senna-docusate 8.6-50 MG per tablet   Commonly known as:  SENOKOT-S;PERICOLACE   Dose:  1 tablet   Quantity:  60 tablet        Take 1 tablet by mouth 2 times daily as needed for constipation   Refills:  11        simvastatin 40 MG tablet   Commonly known as:  ZOCOR   Dose:  40 mg   Quantity:  90 tablet        Take 1 tablet (40 mg) by mouth At Bedtime   Refills:  3        sulfamethoxazole-trimethoprim 400-80 MG per tablet   Commonly known as:  BACTRIM/SEPTRA   Dose:  1 tablet   Quantity:  6 tablet        Take 1 tablet by mouth every 12 hours Take one tab every 12 hours , starting day before procedure   Refills:  0        traZODone 50 MG tablet   Commonly known as:  DESYREL   Dose:  50 mg   Quantity:  90 tablet        Take 1 tablet (50 mg) by mouth At Bedtime   Refills:  2        valACYclovir 1000 mg tablet   Commonly known as:  VALTREX   Dose:  1000 mg   Quantity:  21 tablet        Take 1 tablet (1,000 mg) by mouth 3 times daily   Refills:  0        venlafaxine 150 MG Tb24 24 hr tablet   Commonly known as:  EFFEXOR-ER   Dose:  150 mg   Quantity:  90 each        Take 1 tablet (150 mg) by mouth daily (with breakfast)   Refills:  3        warfarin 5 MG tablet   Commonly known as:  COUMADIN   Dose:  5 mg   Quantity:  100 tablet        Take 1 tablet (5 mg) by mouth daily or as intructed by INR clinic   Refills:  1        * Notice:  This list has 4 medication(s) that are the same as other medications prescribed for you. Read the directions carefully, and ask your doctor or other care provider to review them with you.            Procedures and tests performed during your visit     Arm, right, venous US    Hemoglobin    INR       Orders Needing Specimen Collection     None      Pending Results     No orders found from 2/4/2018 to 2/7/2018.            Pending Culture Results     No orders found from 2/4/2018 to 2/7/2018.            Pending Results Instructions     If you had any lab results that were not finalized at the time of your Discharge, you can call the ED Lab Result RN at [phone]. You will be contacted by this team for any positive Lab results or changes in treatment. The nurses are available 7 days a week from 10A to 6:30P.  You can leave a message 24 hours per day and they will return your call.        Test Results From Your Hospital Stay        2/6/2018  5:07 PM      Narrative     US UPPER EXTREMITY VENOUS DUPLEX RIGHT  2/6/2018 4:57 PM     HISTORY:  Swelling. Rule out clot.    COMPARISON: None.    FINDINGS: Venous Doppler of the right upper extremity with waveform  spectral analysis. No evidence for venous thrombosis. There is a soft  tissue collection measuring 3.6 x 4.6 x 1.6 cm identified in the right  upper extremity in the area of clinically apparent bruising. This is  likely a hematoma.        Impression     IMPRESSION: Right upper extremity soft tissue hematoma. No evidence  for venous thrombosis.    PIERCE MAYO MD         2/6/2018  5:15 PM      Component Results     Component Value Ref Range & Units Status    Hemoglobin 15.3 13.3 - 17.7 g/dL Final         2/6/2018  5:20 PM      Component Results     Component Value Ref Range & Units Status    INR 2.68 (H) 0.86 - 1.14 Final                Clinical Quality Measure: Blood Pressure Screening     Your blood pressure was checked while you were in the emergency department today. The last reading we obtained was  BP: (!) 139/93 . Please read the guidelines below about what these numbers mean and what you should do about them.  If your systolic blood pressure (the top number) is less than 120 and your diastolic blood pressure (the bottom number) is less than 80, then  "your blood pressure is normal. There is nothing more that you need to do about it.  If your systolic blood pressure (the top number) is 120-139 or your diastolic blood pressure (the bottom number) is 80-89, your blood pressure may be higher than it should be. You should have your blood pressure rechecked within a year by a primary care provider.  If your systolic blood pressure (the top number) is 140 or greater or your diastolic blood pressure (the bottom number) is 90 or greater, you may have high blood pressure. High blood pressure is treatable, but if left untreated over time it can put you at risk for heart attack, stroke, or kidney failure. You should have your blood pressure rechecked by a primary care provider within the next 4 weeks.  If your provider in the emergency department today gave you specific instructions to follow-up with your doctor or provider even sooner than that, you should follow that instruction and not wait for up to 4 weeks for your follow-up visit.        Thank you for choosing [facility]       Thank you for choosing [facility] for your care. Our goal is always to provide you with excellent care. Hearing back from our patients is one way we can continue to improve our services. Please take a few minutes to complete the written survey that you may receive in the mail after you visit with us. Thank you!        [employer] Information     [employer] lets you send messages to your doctor, view your test results, renew your prescriptions, schedule appointments and more. To sign up, go to [email or web address] . Click on \"Log in\" on the left side of the screen, which will take you to the Welcome page. Then click on \"Sign up Now\" on the right side of the page.     You will be asked to enter the access code listed below, as well as some personal information. Please follow the directions to create your username and password.     Your access code is: [email or web address]  Expires: 3/4/2018 10:08 AM     Your " access code will  in 90 days. If you need help or a new code, please call your [place] clinic or [phone].        Care EveryWhere ID     This is your Care EveryWhere ID. This could be used by other organizations to access your [facility] [place] medical records  [id number]        Equal Access to Services     PERRI WALTON : Luna andreo Souzma, waaxda luqadaha, qaybta kaalmada susan, sharonda hunt. So [facility] [phone].    ATENCIÓN: Si habla español, tiene a thomas disposición servicios gratuitos de asistencia lingüística. Llame al [phone].    We comply with applicable federal civil rights laws and [place] laws. We do not discriminate on the basis of race, color, national origin, age, disability, sex, sexual orientation, or gender identity.            After Visit Summary       This is your record. Keep this with you and show to your community pharmacist(s) and doctor(s) at your next visit.

## 2018-02-06 NOTE — ED PROVIDER NOTES
History     Chief Complaint:  Wound Check    HPI   Todd S. Aschoff is a 62 year old male on Coumadin for an artifical heart valve and a history of A-Fib who presents with a wound check.The patient reports to have been seen last Sunday for a lump on his right elbow after a fall.  He was diagnosed with a hematoma, for which supportive measures were recommended.  He was provided an ACE bandage for support.  Today, he took off the ACE wrap for the first time, noting some oozing from the hematoma as well as spreading bruising across his arm.  He called his PCP office, noting swelling to the arm, and was recommended to come to the ER for further evaluation given concern for possible blood clot.  Pt denies chest pain, SOB, fevers, or chills, though does not mild dizziness.  Has been using only cold compresses for pain.  Has been trying to keep arm elevated.  No prior hx of DVT.  No interval traumatic injuries.  No other concerns are voiced.    Allergies:  Allergy, Unspecified    Medications:    Astelin  Desyrel  Zyrtec  Coumadin  Aricept  Bactrim  Lovenox  Myrbetriq  Valtrex  Synthroid  Rythmol  Zocor  Enoxaparin  Midrin  Senokot-S  Effexor-ER  Tenex  Valium  Flexeril  MAPAP     Past Medical History:    Obesity  Long-Term Use of Anticoagulants  Overactive Bladder  Memory Difficulties  H/O Dissecting Thoracic Aneurysm Repair  Lumbar Radiculopathy  Major Depressive Disorder  Tourette Syndrome  Chronic Low Back Pain  BPH  Aortic valve Prosthesis Present  Atrial Fibrillation  Hypothyroidism  Blood Transfusion  Chronic Infection  Coagulation Disorder  Heart Murmur  H/O Spinal Cord Injury  Mixed hyperlipidemia  Osteoarthritis  Sciatica  Prostate Infection  Alcohol Dependence    Past Surgical History:    Aortic Valve Replacement  Repair of TAA with Graft  Right Hip Replacement  Total Hip Arthroplasty, Left  Cataract IOL, Right  Cholecystectomy, Laparoscopic  Colonoscopy  Decompression Lumbar Level One  Decompression, Fusion  Cervical Anterior One Level, Combined  Explore Spine, Remove Hardware, Combined  Fusion Cervical Anterior Two Levels  Irrigation and Debridement Spine  Removal of Cyst, Back  Tonsillectomy  [place] Teeth Removal    Family History:    C.V.D., Father  Prostate Cancer  Cancer  Hypertension  Sleep Apnea    Social History:  Marital Status:     Tobacco Status: Never Used  Alcohol Use: No     Review of Systems   Constitutional: Negative for chills and fever.   Respiratory: Negative for shortness of breath.    Cardiovascular: Negative for chest pain.   Musculoskeletal:        Positive for Right Arm to Hand Swelling   Skin: Positive for color change and wound.   Neurological: Positive for dizziness.   All other systems reviewed and are negative.    Physical Exam     Patient Vitals for the past 24 hrs:   BP Temp Temp src Pulse Resp SpO2 Weight   02/06/18 1732 - - - - - 94 % -   02/06/18 1731 134/86 - - - - - -   02/06/18 1546 (!) 139/93 97.8  F (36.6  C) Oral 68 18 96 % 135.5 kg (298 lb 11.6 oz)     Physical Exam  General:                        Well-nourished                        Speaking in full sentences  Eyes:                        Conjunctiva without injection or scleral icterus                        PERRL  ENT:                        Moist mucous membranes                        Posterior oropharynx clear without erythema or exudate                        Nares patent                        Pinnae normal  Neck:                        Full ROM                        No stiffness appreciated  Resp:                        Lungs CTAB                        No crackles, wheezing or audible rubs                        Good air movement  CV:                                        Normal rate, regular rhythm                        S1 and S2 present                        Loud systolic click  GI:                        BS present                        Abdomen soft without distention                        Non-tender  to light and deep palpation                        No guarding or rebound tenderness  Skin:                        Warm, dry, well perfused                        No rashes or open wounds on exposed skin  MSK:                        Moves all extremities                        Right upper extremity:                        Hematoma and swelling to lateral aspect of elbow, with drainage tract, though no active bleeding or expressible fluid                        Ecchymoses noted to right humerus and elbow crease                        Compartments are soft                        Arm and wrist are warm, well-perfused                        2+ radial pulse  Neuro:                        Alert                        Answers questions appropriately                        Moves all extremities equally                        Gait stable  Psych:                        Normal affect, normal mood    Emergency Department Course     Imaging:  Radiographic findings were communicated with the patient who voiced understanding of the findings.  Arm, Right, Venous US:   IMPRESSION: Right upper extremity soft tissue hematoma. No evidence  for venous thrombosis.  Results Per Radiology.    Laboratory:  Hemoglobin: 15.3  INR: 2.68 (H)    Emergency Department Course:  Past medical records, nursing notes, and vitals reviewed.  1608: I performed an exam of the patient and obtained history, as documented above.  The patient was sent for a Right Arm US while in the emergency department, findings above.  1725: I rechecked the patient. Findings and plan explained to the Patient. Patient discharged home with instructions regarding supportive care, medications, and reasons to return. The importance of close follow-up was reviewed.     Impression & Plan      Medical Decision Making:  Todd Aschoff is a 62 yr old M presenting to the emergency department for evaluation of a wound check.  VS on presentation reveal elevated BP though otherwise are  unremarkable.  Patient presents to the ER today after contacting the nurse care line regarding associated swelling to his right arm after removal ACE bandage with associated scant bloody drainage.  Examination is notable for a distinct palpable hematoma to the right lateral elbow, as well as ecchymoses about the elbow crease and humerus.  Symptoms at present are most suspicious for resolving hematoma in the setting of recent fall, complicated by chronic anticoagulation.  US confirms soft tissue collection measuring 3.6 x 4.6 x 1.6 cm fluid collection about the region of swelling.  Per nurse care line, he was recommended to seek evaluation in the ED for further evaluation and to rule out blood clot. Ultrasound is negative for evidence of DVT. Compartments are soft throughout the upper arm and forearm, arguing against compartment syndrome.  He does not exhibit pain out of proportion to exam and distal pulses are intact.  He denies associated fevers, and the region of involvement does not appear notably erythematous with discharge of pus.  I feel this is unlikely to represent cellulitis.  We discussed options for further management including supportive measures (elevation, ice, compression) vs drainage (including risk of bleeding, infection, among others).  We elected to continue with conservative therapy.  INR is therapeutic.  Hemoglobin baseline.  Results of the above studies were discussed with the patient.  At this point I feel he can be safely discharged home.  I recommended ice, elevation, and close monitoring of symptoms.  Patient felt comfortable this plan of care and all questions are answered prior to discharge.        Diagnosis:    ICD-10-CM    1. Hematoma T14.8XXA    2. Chronic anticoagulation Z79.01        Disposition:  discharged to home    Miller Sheppard  2/6/2018   [facility] EMERGENCY DEPARTMENT  I, Miller Sheppard, am serving as a scribe at 4:08 PM on 2/6/2018 to document services personally  performed by Jamshid Gottlieb MD based on my observations and the provider's statements to me.         Jamshid Gottlieb MD  02/06/18 9122

## 2018-02-08 ENCOUNTER — ANTICOAGULATION THERAPY VISIT (OUTPATIENT)
Dept: ANTICOAGULATION | Facility: CLINIC | Age: 63
End: 2018-02-08
Payer: COMMERCIAL

## 2018-02-08 DIAGNOSIS — Z79.01 LONG-TERM (CURRENT) USE OF ANTICOAGULANTS: ICD-10-CM

## 2018-02-08 DIAGNOSIS — Z95.2 AORTIC VALVE PROSTHESIS PRESENT: ICD-10-CM

## 2018-02-08 LAB — INR POINT OF CARE: 2.7 (ref 0.86–1.14)

## 2018-02-08 PROCEDURE — 36416 COLLJ CAPILLARY BLOOD SPEC: CPT

## 2018-02-08 PROCEDURE — 85610 PROTHROMBIN TIME: CPT | Mod: QW

## 2018-02-08 PROCEDURE — 99207 ZZC NO CHARGE NURSE ONLY: CPT

## 2018-02-08 NOTE — MR AVS SNAPSHOT
Todd S Aschoff   2/8/2018 8:15 AM   Anticoagulation Therapy Visit    Description:  62 year old male   Provider:  [facility]   Department:  [facility]           INR as of 2/8/2018     Today's INR 2.7      Anticoagulation Summary as of 2/8/2018     INR goal 2.5-3.5   Today's INR 2.7   Full instructions 2/8: 7.5 mg; Otherwise 5 mg every day   Next INR check 2/15/2018    Indications   Long-term (current) use of anticoagulants [Z79.01] [Z79.01]  Aortic valve prosthesis present [Z95.2]  Atrial fibrillation (H) [I48.91]         Your next Anticoagulation Clinic appointment(s)     Feb 15, 2018  3:15 PM CST   [facility]ticoagulation Visit with [facility]   [facility]    [address]  [facility] [id number] [phone]              Contact Numbers     [facility] Phone Numbers:  Anticoagulation Clinic Appointments : [phone]  Anticoagulation Nurse: [phone]         February 2018 Details    Sun Mon Tue Wed Thu Fri Sat         1               2               3                 4               5               6               7               8      7.5 mg   See details      9      5 mg         10      5 mg           11      5 mg         12      5 mg         13      5 mg         14      5 mg         15            16               17                 18               19               20               21               22               23               24                 25               26               27               28                   Date Details   02/08 This INR check       Date of next INR:  2/15/2018         How to take your warfarin dose     To take:  5 mg Take 1 of the 5 mg tablets.    To take:  7.5 mg Take 1.5 of the 5 mg tablets.

## 2018-02-08 NOTE — PROGRESS NOTES
ANTICOAGULATION FOLLOW-UP CLINIC VISIT    Patient Name:  Todd S Aschoff  Date:  2/8/2018  Contact Type:  Face to Face    SUBJECTIVE:     Patient Findings     Positives Bruising (Pt has hematoma on right upper arm after a fall. Pt fell over an electric cord and landed on a bookcase. Pt has been to ED x2,  pt sees PCP 2/9/18 for f/u), No Problem Findings           OBJECTIVE    INR Protime   Date Value Ref Range Status   02/08/2018 2.7 (A) 0.86 - 1.14 Final       ASSESSMENT / PLAN  INR assessment THER    Recheck INR In: 1 WEEK    INR Location Clinic      Anticoagulation Summary as of 2/8/2018     INR goal 2.5-3.5   Today's INR 2.7   Maintenance plan 5 mg (5 mg x 1) every day   Full instructions 2/8: 7.5 mg; Otherwise 5 mg every day   Weekly total 35 mg   Plan last modified Amanda Wheeler RN (7/19/2017)   Next INR check 2/15/2018   Priority INR   Target end date     Indications   Long-term (current) use of anticoagulants [Z79.01] [Z79.01]  Aortic valve prosthesis present [Z95.2]  Atrial fibrillation (H) [I48.91]         Anticoagulation Episode Summary     INR check location     Preferred lab     Send INR reminders to [facility]    Comments       Anticoagulation Care Providers     Provider Role Specialty Phone number    Obdulio Ingram MD [facility] Internal Medicine [phone]            See the Encounter Report to view Anticoagulation Flowsheet and Dosing Calendar (Go to Encounters tab in chart review, and find the Anticoagulation Therapy Visit)    Dosage adjustment made based on physician directed care plan.    Lucina Andrew RN

## 2018-02-09 ENCOUNTER — OFFICE VISIT (OUTPATIENT)
Dept: INTERNAL MEDICINE | Facility: CLINIC | Age: 63
End: 2018-02-09
Payer: COMMERCIAL

## 2018-02-09 VITALS
HEIGHT: 74 IN | TEMPERATURE: 98.7 F | SYSTOLIC BLOOD PRESSURE: 130 MMHG | HEART RATE: 70 BPM | WEIGHT: 300 LBS | DIASTOLIC BLOOD PRESSURE: 72 MMHG | OXYGEN SATURATION: 96 % | BODY MASS INDEX: 38.5 KG/M2

## 2018-02-09 DIAGNOSIS — R07.0 THROAT PAIN: ICD-10-CM

## 2018-02-09 DIAGNOSIS — S40.021D HEMATOMA OF ARM, RIGHT, SUBSEQUENT ENCOUNTER: Primary | ICD-10-CM

## 2018-02-09 DIAGNOSIS — F33.42 MAJOR DEPRESSIVE DISORDER, RECURRENT EPISODE, IN FULL REMISSION (H): ICD-10-CM

## 2018-02-09 LAB
DEPRECATED S PYO AG THROAT QL EIA: NORMAL
SPECIMEN SOURCE: NORMAL

## 2018-02-09 PROCEDURE — 99214 OFFICE O/P EST MOD 30 MIN: CPT | Performed by: INTERNAL MEDICINE

## 2018-02-09 PROCEDURE — 87081 CULTURE SCREEN ONLY: CPT | Performed by: INTERNAL MEDICINE

## 2018-02-09 PROCEDURE — 87880 STREP A ASSAY W/OPTIC: CPT | Performed by: INTERNAL MEDICINE

## 2018-02-09 NOTE — NURSING NOTE
"Chief Complaint   Patient presents with     ER F/U     Pharyngitis       Initial /72 (BP Location: Left arm, Patient Position: Sitting, Cuff Size: Adult Large)  Pulse 70  Temp 98.7  F (37.1  C) (Oral)  Ht 6' 1.5\" (1.867 m)  Wt 300 lb (136.1 kg)  SpO2 96%  BMI 39.04 kg/m2 Estimated body mass index is 39.04 kg/(m^2) as calculated from the following:    Height as of this encounter: 6' 1.5\" (1.867 m).    Weight as of this encounter: 300 lb (136.1 kg).  Medication Reconciliation: complete   Nick MONTEZ      "

## 2018-02-09 NOTE — PATIENT INSTRUCTIONS
The firm lump beneath the skin is a hematoma. This will likely take weeks to shrink and get back near baseline.   The purplish bruise will turn yellow/green, then fade to gone. This will happen more quickly.   No signs of infection.     Throat swab negative for strep.     See me back in May or later for your annual exam. Call sooner if problems.

## 2018-02-09 NOTE — MR AVS SNAPSHOT
After Visit Summary   2/9/2018    Todd S Aschoff    MRN: [id number]           Patient Information     Date Of Birth          1955        Visit Information        Provider Department      2/9/2018 1:20 PM Obdulio Ingram MD [facility]        Today's Diagnoses     Hematoma of arm, right, subsequent encounter    -  1    Throat pain          Care Instructions    The firm lump beneath the skin is a hematoma. This will likely take weeks to shrink and get back near baseline.   The purplish bruise will turn yellow/green, then fade to gone. This will happen more quickly.   No signs of infection.     Throat swab negative for strep.     See me back in May or later for your annual exam. Call sooner if problems.           Follow-ups after your visit        Your next 10 appointments already scheduled     Feb 15, 2018  3:15 PM CST   [facility] Visit with [facility]   [facility]    [address]  [facility] [id number] [phone]            Jun 14, 2018  8:00 AM CDT   Return Visit with Geraldo Gonzales MD   [facility] ([facility])    [address]  [address]  [facility] [id number] [phone]              Who to contact     If you have questions or need follow up information about today's clinic visit or your schedule please contact [facility] directly at [phone].  Normal or non-critical lab and imaging results will be communicated to you by MyChart, letter or phone within 4 business days after the clinic has received the results. If you do not hear from us within 7 days, please contact the clinic through MyChart or phone. If you have a critical or abnormal lab result, we will notify you by phone as soon as possible.  Submit refill requests through [employer] or call your pharmacy and they will forward the refill  "request to us. Please allow 3 business days for your refill to be completed.          Additional Information About Your Visit        MyChart Information     [employer] lets you send messages to your doctor, view your test results, renew your prescriptions, schedule appointments and more. To sign up, go to [email or web address] . Click on \"Log in\" on the left side of the screen, which will take you to the Welcome page. Then click on \"Sign up Now\" on the right side of the page.     You will be asked to enter the access code listed below, as well as some personal information. Please follow the directions to create your username and password.     Your access code is: [email or web address]  Expires: 3/4/2018 10:08 AM     Your access code will  in 90 days. If you need help or a new code, please call your [place] clinic or [phone].        Care EveryWhere ID     This is your [facility] EveryWhere ID. This could be used by other organizations to access your [facility] medical records  [id number]        Your Vitals Were     Pulse Temperature Height Pulse Oximetry BMI (Body Mass Index)       70 98.7  F (37.1  C) (Oral) 6' 1.5\" (1.867 m) 96% 39.04 kg/m2        Blood Pressure from Last 3 Encounters:   18 130/72   18 134/86   18 121/87    Weight from Last 3 Encounters:   18 300 lb (136.1 kg)   18 298 lb 11.6 oz (135.5 kg)   17 275 lb (124.7 kg)              We Performed the Following     Beta strep group A culture     Rapid strep screen        Primary Care Provider Office Phone # Fax #    Obdulio Ingram [phone]       [address] [address]        Equal Access to Services     [facility] ANTON AH: Luna Petersen, waaxda luqadaha, qaybta kaalmada susan, sharonda hunt. So [facility] [phone].    ATENCIÓN: Si habla español, tiene a thomas disposición servicios gratuitos de asistencia lingüística. Llame al [phone].    We " comply with applicable federal civil rights laws and [place] laws. We do not discriminate on the basis of race, color, national origin, age, disability, sex, sexual orientation, or gender identity.            Thank you!     Thank you for choosing [facility]  for your care. Our goal is always to provide you with excellent care. Hearing back from our patients is one way we can continue to improve our services. Please take a few minutes to complete the written survey that you may receive in the mail after your visit with us. Thank you!             Your Updated Medication List - Protect others around you: Learn how to safely use, store and throw away your medicines at www.disposemymeds.org.          This list is accurate as of 2/9/18  2:06 PM.  Always use your most recent med list.                   Brand Name Dispense Instructions for use Diagnosis    * acetaminophen 650 MG 8 hour tablet    ACETAMINOPHEN 8 HOUR    250 tablet    Take 650 mg by mouth 2 times daily    Chronic low back pain       * MAPAP ARTHRITIS PAIN 650 MG CR tablet   Generic drug:  acetaminophen     180 tablet    TAKE 650 MG BY MOUTH 2 TIMES DAILY    Chronic low back pain       azelastine 0.1 % spray    ASTELIN    30 mL    USE 2 SPRAYS IN NOSTRIL 2 TIMES DAILY AS NEEDED.    Chronic rhinitis       cetirizine 10 MG tablet    zyrTEC    30 tablet    TAKE 1 TABLET BY MOUTH ONCE DAILY AS NEEDED.    Chronic rhinitis       cyclobenzaprine 10 MG tablet    FLEXERIL    270 tablet    Take 1 tablet (10 mg) by mouth 3 times daily as needed for muscle spasms    Muscle spasm       diazepam 5 MG tablet    VALIUM    270 tablet    TAKE 1 TABLET BY MOUTH EVERY 8 HOURS AS NEEDED FOR ANXIETY OR MUSCLE SPASMS.    Back muscle spasm       donepezil 10 MG tablet    ARICEPT    90 tablet    Take 1 tablet (10 mg) by mouth At Bedtime    Memory difficulties       enoxaparin 120 MG/0.8ML injection    LOVENOX    20 Syringe    Inject 0.9 mLs (135 mg) Subcutaneous  every 12 hours    Aortic valve prosthesis present, Long term current use of anticoagulant therapy       guanFACINE 1 MG tablet    TENEX    90 tablet    Take 1 tablet (1 mg) by mouth At Bedtime    Major depressive disorder, recurrent episode, in full remission (H)       isometheptene-acetaminophen-dichloralphenazone -100 MG per capsule    MIDRIN    30 capsule    Take 1 capsule by mouth 4 times daily as needed    Headache(784.0)       levothyroxine 150 MCG tablet    SYNTHROID/LEVOTHROID    90 tablet    TAKE ONE TABLET BY MOUTH ONE TIME DAILY    Acquired hypothyroidism       mirabegron 50 MG 24 hr tablet    MYRBETRIQ    90 tablet    Take 1 tablet (50 mg) by mouth daily    Overactive bladder       NEURONTIN PO      Take 100 mg by mouth 2 times daily        propafenone 150 MG Tabs tablet    RYTHMOL    270 tablet    Take 1 tablet (150 mg) by mouth every 8 hours    Chronic atrial fibrillation (H)       senna-docusate 8.6-50 MG per tablet    SENOKOT-S;PERICOLACE    60 tablet    Take 1 tablet by mouth 2 times daily as needed for constipation    Constipation, unspecified constipation type       simvastatin 40 MG tablet    ZOCOR    90 tablet    Take 1 tablet (40 mg) by mouth At Bedtime    Hyperlipidemia LDL goal <130       traZODone 50 MG tablet    DESYREL    90 tablet    Take 1 tablet (50 mg) by mouth At Bedtime    Primary insomnia       venlafaxine 150 MG Tb24 24 hr tablet    EFFEXOR-ER    90 each    Take 1 tablet (150 mg) by mouth daily (with breakfast)    Major depressive disorder, recurrent episode, in full remission (H)       warfarin 5 MG tablet    COUMADIN    100 tablet    Take 1 tablet (5 mg) by mouth daily or as intructed by INR clinic    Current use of long term anticoagulation       * Notice:  This list has 2 medication(s) that are the same as other medications prescribed for you. Read the directions carefully, and ask your doctor or other care provider to review them with you.

## 2018-02-09 NOTE — PROGRESS NOTES
SUBJECTIVE:   Todd S Aschoff is a 62 year old male who presents to clinic today for the following health issues:    ED/UC Followup:    Facility:  [facility] ED  Date of visit: 02/04/2018 & 02/06/2018  Reason for visit: wound check, right elbow  Current Status: stable     Patient was in the ED 2/4/2018 due to a hematoma on his right elbow that developed after he fell about a week prior. Patient has been on coumadin for about 20 years. He had an US and x-ray performed which were negative for any DVT and bone fractures. He had the hematoma wrapped and was discharged. When he unwrapped the bandages a few days later to shower he noticed that the hematoma opened up because a blood blister popped, and the bruising had spread up and down his right arm. He then returned to the ED that day on 2/6/2018 for a wound check. He denies any further injury to the arm after the fall. Further tests confirmed that it is a deep contusion and hematoma. He was discharged without any antibiotics or pain meds. Patient notes that the hematoma has already shrunk in size.     Depression  Patient notes that his psychiatrist increased medication dosage to 3x day during the winter.     Sore throat  He has had a sore throat. He denies any ear ache, sinus pain, fever or chills, or muscle aches. No significant cough or dyspnea. Notes that his grandmother had strep and was just finishing her antibiotics.     Problem list and histories reviewed & adjusted, as indicated.  Additional history: as documented    Reviewed and updated as needed this visit by clinical staff  Tobacco  Allergies  Meds  Med Hx  Surg Hx  Fam Hx  Soc Hx      Reviewed and updated as needed this visit by Provider       ROS:  REVIEW OF SYSTEMS: The following systems have been completely reviewed and are negative except as noted in the HPI:   Constitutional, HEENT, respiratory, cardiovascular, musculoskeletal, dermatologic, hematologic, endocrine, psychiatric, and  "neurologic systems.    HEENT: POSITIVE for sore throat  SKIN: POSITIVE for hematoma on right elbow    This document serves as a record of the services and decisions personally performed and made by Obdulio Ingram MD. It was created on his behalf by Marcella Le, a trained medical scribe. The creation of this document is based on the provider's statements to the medical scribe.  Marcella Le February 9, 2018 1:44 PM     OBJECTIVE:     /72 (BP Location: Left arm, Patient Position: Sitting, Cuff Size: Adult Large)  Pulse 70  Temp 98.7  F (37.1  C) (Oral)  Ht 1.867 m (6' 1.5\")  Wt 136.1 kg (300 lb)  SpO2 96%  BMI 39.04 kg/m2  Body mass index is 39.04 kg/(m^2).    GENERAL: healthy, alert and no distress  EYES: Eyes grossly normal to inspection, PERRL and conjunctivae and sclerae normal  HENT: ear canals and TM's normal, nose and mouth without ulcers or lesions  NECK: no adenopathy, no asymmetry, masses, or scars and thyroid normal to palpation  RESP: lungs clear to auscultation - no rales, rhonchi or wheezes  CV: regular rate and rhythm, normal S1 S2, no S3 or S4, no murmur, click or rub, no peripheral edema and peripheral pulses strong  MS: no gross musculoskeletal defects noted, no edema  SKIN: Small open area at center of skin area that overlies hematoma. No purulent drainage or erythema. Subcutaneous hematoma above  right elbow,  and ecchymoses of varying color/stages or resolution involving right upper arm  NEURO: Normal strength and tone, mentation intact and speech normal  PSYCH: mentation appears normal, affect normal/bright    ASSESSMENT/PLAN:   (S40.292D) Hematoma of arm, right, subsequent encounter  (primary encounter diagnosis)  Comment: Healing well, no signs of infection present. Assured patient that the firmness of hematoma is normal and should take a few weeks to return to baseline while the skin discoloration should improve more quickly. Follow up if free bleeding or pus drainage occurs. "     (R07.0) Throat pain  Comment: Rapid strep test negative. Follow up if symptoms worsen or do not improve  Plan: Rapid strep screen, Beta strep group A culture    (F33.42) Major depressive disorder  Doing well. Continue current meds and psychiatry f/u.           FUTURE APPOINTMENTS:       - Follow-up visit in May 2018    Patient Instructions   The firm lump beneath the skin is a hematoma. This will likely take weeks to shrink and get back near baseline.   The purplish bruise will turn yellow/green, then fade to gone. This will happen more quickly.   No signs of infection.     Throat swab negative for strep.     See me back in May or later for your annual exam. Call sooner if problems.     The information in this document, created by the medical scribe for me, accurately reflects the services I personally performed and the decisions made by me. I have reviewed and approved this document for accuracy prior to leaving the patient care area.  February 9, 2018 1:45 PM    Obdulio Ingram MD  [facility]

## 2018-02-10 LAB
BACTERIA SPEC CULT: NORMAL
SPECIMEN SOURCE: NORMAL

## 2018-02-16 ENCOUNTER — ANTICOAGULATION THERAPY VISIT (OUTPATIENT)
Dept: ANTICOAGULATION | Facility: CLINIC | Age: 63
End: 2018-02-16
Payer: COMMERCIAL

## 2018-02-16 DIAGNOSIS — I48.91 ATRIAL FIBRILLATION (H): ICD-10-CM

## 2018-02-16 DIAGNOSIS — Z79.01 LONG-TERM (CURRENT) USE OF ANTICOAGULANTS: ICD-10-CM

## 2018-02-16 DIAGNOSIS — Z95.2 AORTIC VALVE PROSTHESIS PRESENT: ICD-10-CM

## 2018-02-16 LAB — INR POINT OF CARE: 2.5 (ref 0.86–1.14)

## 2018-02-16 PROCEDURE — 36416 COLLJ CAPILLARY BLOOD SPEC: CPT

## 2018-02-16 PROCEDURE — 99207 ZZC NO CHARGE NURSE ONLY: CPT

## 2018-02-16 PROCEDURE — 85610 PROTHROMBIN TIME: CPT | Mod: QW

## 2018-02-16 NOTE — PROGRESS NOTES
ANTICOAGULATION FOLLOW-UP CLINIC VISIT    Patient Name:  Todd S Aschoff  Date:  2/16/2018  Contact Type:  Face to Face    SUBJECTIVE:     Patient Findings     Positives Bruising (bruising from hematoma has improved and is nearly gone.)           OBJECTIVE    INR Protime   Date Value Ref Range Status   02/16/2018 2.5 (A) 0.86 - 1.14 Final       ASSESSMENT / PLAN  INR assessment THER    Recheck INR In: 10 DAYS    INR Location Clinic      Anticoagulation Summary as of 2/16/2018     INR goal 2.5-3.5   Today's INR 2.5   Maintenance plan 5 mg (5 mg x 1) every day   Full instructions 2/16: 7.5 mg; 2/23: 7.5 mg; Otherwise 5 mg every day   Weekly total 35 mg   Plan last modified Amanda Wheeler RN (7/19/2017)   Next INR check 2/26/2018   Priority INR   Target end date     Indications   Long-term (current) use of anticoagulants [Z79.01] [Z79.01]  Aortic valve prosthesis present [Z95.2]  Atrial fibrillation (H) [I48.91]         Anticoagulation Episode Summary     INR check location     Preferred lab     Send INR reminders to [facility]    Comments       Anticoagulation Care Providers     Provider Role Specialty Phone number    Obdulio Ingram MD Responsible Internal Medicine [phone]            See the Encounter Report to view Anticoagulation Flowsheet and Dosing Calendar (Go to Encounters tab in chart review, and find the Anticoagulation Therapy Visit)    Dosage adjustment made based on physician directed care plan.    Amanda Wheeler RN

## 2018-02-16 NOTE — MR AVS SNAPSHOT
Todd S Aschoff   2/16/2018 9:30 AM   Anticoagulation Therapy Visit    Description:  62 year old male   Provider:  [facility]   Department:  [facility]           INR as of 2/16/2018     Today's INR 2.5      Anticoagulation Summary as of 2/16/2018     INR goal 2.5-3.5   Today's INR 2.5   Full instructions 2/16: 7.5 mg; 2/23: 7.5 mg; Otherwise 5 mg every day   Next INR check 2/26/2018    Indications   Long-term (current) use of anticoagulants [Z79.01] [Z79.01]  Aortic valve prosthesis present [Z95.2]  Atrial fibrillation (H) [I48.91]         Your next Anticoagulation Clinic appointment(s)     Feb 26, 2018  1:15 PM CST   [facility]ticoagulation Visit with [facility]    [address] [facility] [address]  [facility] [id number] [phone]              Contact Numbers     [facility] Phone Numbers:  Anticoagulation Clinic Appointments : [phone]  Anticoagulation Nurse: [phone]         February 2018 Details    Sun Mon Tue Wed Thu Fri Sat         1               2               3                 4               5               6               7               8               9               10                 11               12               13               14               15               16      7.5 mg   See details      17      5 mg           18      5 mg         19      5 mg         20      5 mg         21      5 mg         22      5 mg         23      7.5 mg         24      5 mg           25      5 mg         26            27               28                   Date Details   02/16 This INR check       Date of next INR:  2/26/2018         How to take your warfarin dose     To take:  5 mg Take 1 of the 5 mg tablets.    To take:  7.5 mg Take 1.5 of the 5 mg tablets.

## 2018-02-22 ENCOUNTER — HOSPITAL ENCOUNTER (EMERGENCY)
Facility: CLINIC | Age: 63
Discharge: HOME | End: 2018-02-22
Attending: EMERGENCY MEDICINE | Admitting: EMERGENCY MEDICINE
Payer: COMMERCIAL

## 2018-02-22 VITALS
TEMPERATURE: 97.6 F | DIASTOLIC BLOOD PRESSURE: 81 MMHG | OXYGEN SATURATION: 98 % | SYSTOLIC BLOOD PRESSURE: 132 MMHG | RESPIRATION RATE: 16 BRPM

## 2018-02-22 DIAGNOSIS — T50.905A ADVERSE DRUG EFFECT, INITIAL ENCOUNTER: ICD-10-CM

## 2018-02-22 LAB
AMPHETAMINES UR QL SCN: NEGATIVE
BARBITURATES UR QL: NEGATIVE
BENZODIAZ UR QL: POSITIVE
CANNABINOIDS UR QL SCN: NEGATIVE
COCAINE UR QL: NEGATIVE
ETHANOL UR QL SCN: NEGATIVE
OPIATES UR QL SCN: NEGATIVE

## 2018-02-22 PROCEDURE — 99284 EMERGENCY DEPT VISIT MOD MDM: CPT | Mod: Z6 | Performed by: EMERGENCY MEDICINE

## 2018-02-22 PROCEDURE — 99285 EMERGENCY DEPT VISIT HI MDM: CPT | Performed by: EMERGENCY MEDICINE

## 2018-02-22 PROCEDURE — 80307 DRUG TEST PRSMV CHEM ANLYZR: CPT | Performed by: FAMILY MEDICINE

## 2018-02-22 PROCEDURE — 25000132 ZZH RX MED GY IP 250 OP 250 PS 637: Performed by: EMERGENCY MEDICINE

## 2018-02-22 PROCEDURE — 80320 DRUG SCREEN QUANTALCOHOLS: CPT | Performed by: FAMILY MEDICINE

## 2018-02-22 RX ORDER — HYDROXYZINE HYDROCHLORIDE 50 MG/1
50 TABLET, FILM COATED ORAL 3 TIMES DAILY PRN
Status: COMPLETED | OUTPATIENT
Start: 2018-02-22 | End: 2018-02-22

## 2018-02-22 RX ADMIN — HYDROXYZINE HYDROCHLORIDE 50 MG: 50 TABLET, FILM COATED ORAL at 21:23

## 2018-02-22 ASSESSMENT — ENCOUNTER SYMPTOMS
DYSPHORIC MOOD: 1
CONSTITUTIONAL NEGATIVE: 1
GASTROINTESTINAL NEGATIVE: 1
AGITATION: 1
NERVOUS/ANXIOUS: 1

## 2018-02-22 NOTE — ED AVS SNAPSHOT
[facility], Emergency Department    [address]    [place] [address]    Phone:  [phone]    Fax:  [phone]                                       Todd S Aschoff   MRN: [id number]    Department:  [facility], Emergency Department   Date of Visit:  2/22/2018           Patient Information     Date Of Birth          1955        Your diagnoses for this visit were:     Adverse drug effect, initial encounter        You were seen by Garland Fernandez MD.        Discharge Instructions       Discontinue gabapentin and contact the prescriber about your reaction to this medication  Return if you are not feeling safe at home  It will take about 24 hours for the gabapentin to wash out of your system    Future Appointments        Provider Department Dept Phone Center    2/26/2018 1:15 PM [facility] [phone] [place]    6/14/2018 8:00 AM Geraldo Gonzales MD [facility] [phone]  [facility]      24 Hour Appointment Hotline       To make an appointment at any [facility], call [phone] ([phone]). If you don't have a family doctor or clinic, we will help you find one. [facility] are conveniently located to serve the needs of you and your family.             Review of your medicines      Our records show that you are taking the medicines listed below. If these are incorrect, please call your family doctor or clinic.        Dose / Directions Last dose taken    * acetaminophen 650 MG 8 hour tablet   Commonly known as:  ACETAMINOPHEN 8 HOUR   Dose:  650 mg   Quantity:  250 tablet        Take 650 mg by mouth 2 times daily   Refills:  3        * MAPAP ARTHRITIS PAIN 650 MG CR tablet   Quantity:  180 tablet   Generic drug:  acetaminophen        TAKE 650 MG BY MOUTH 2 TIMES DAILY   Refills:  2        azelastine 0.1 % spray   Commonly known as:  ASTELIN   Quantity:  30 mL        USE 2  SPRAYS IN NOSTRIL 2 TIMES DAILY AS NEEDED.   Refills:  0        cetirizine 10 MG tablet   Commonly known as:  zyrTEC   Quantity:  30 tablet        TAKE 1 TABLET BY MOUTH ONCE DAILY AS NEEDED.   Refills:  3        cyclobenzaprine 10 MG tablet   Commonly known as:  FLEXERIL   Dose:  10 mg   Quantity:  270 tablet        Take 1 tablet (10 mg) by mouth 3 times daily as needed for muscle spasms   Refills:  3        diazepam 5 MG tablet   Commonly known as:  VALIUM   Quantity:  270 tablet        TAKE 1 TABLET BY MOUTH EVERY 8 HOURS AS NEEDED FOR ANXIETY OR MUSCLE SPASMS.   Refills:  0        donepezil 10 MG tablet   Commonly known as:  ARICEPT   Dose:  10 mg   Quantity:  90 tablet        Take 1 tablet (10 mg) by mouth At Bedtime   Refills:  1        enoxaparin 120 MG/0.8ML injection   Commonly known as:  LOVENOX   Dose:  1 mg/kg   Quantity:  20 Syringe        Inject 0.9 mLs (135 mg) Subcutaneous every 12 hours   Refills:  1        guanFACINE 1 MG tablet   Commonly known as:  TENEX   Dose:  1 mg   Quantity:  90 tablet        Take 1 tablet (1 mg) by mouth At Bedtime   Refills:  3        isometheptene-acetaminophen-dichloralphenazone -100 MG per capsule   Commonly known as:  MIDRIN   Dose:  1 capsule   Quantity:  30 capsule        Take 1 capsule by mouth 4 times daily as needed   Refills:  0        levothyroxine 150 MCG tablet   Commonly known as:  SYNTHROID/LEVOTHROID   Quantity:  90 tablet        TAKE ONE TABLET BY MOUTH ONE TIME DAILY   Refills:  3        mirabegron 50 MG 24 hr tablet   Commonly known as:  MYRBETRIQ   Dose:  50 mg   Quantity:  90 tablet        Take 1 tablet (50 mg) by mouth daily   Refills:  3        NEURONTIN PO   Dose:  100 mg        Take 100 mg by mouth 2 times daily   Refills:  0        propafenone 150 MG Tabs tablet   Commonly known as:  RYTHMOL   Dose:  150 mg   Quantity:  270 tablet        Take 1 tablet (150 mg) by mouth every 8 hours   Refills:  3        senna-docusate 8.6-50 MG per tablet    Commonly known as:  SENOKOT-S;PERICOLACE   Dose:  1 tablet   Quantity:  60 tablet        Take 1 tablet by mouth 2 times daily as needed for constipation   Refills:  11        simvastatin 40 MG tablet   Commonly known as:  ZOCOR   Dose:  40 mg   Quantity:  90 tablet        Take 1 tablet (40 mg) by mouth At Bedtime   Refills:  3        traZODone 50 MG tablet   Commonly known as:  DESYREL   Dose:  50 mg   Quantity:  90 tablet        Take 1 tablet (50 mg) by mouth At Bedtime   Refills:  2        venlafaxine 150 MG Tb24 24 hr tablet   Commonly known as:  EFFEXOR-ER   Dose:  150 mg   Quantity:  90 each        Take 1 tablet (150 mg) by mouth daily (with breakfast)   Refills:  3        warfarin 5 MG tablet   Commonly known as:  COUMADIN   Dose:  5 mg   Quantity:  100 tablet        Take 1 tablet (5 mg) by mouth daily or as intructed by INR clinic   Refills:  1        * Notice:  This list has 2 medication(s) that are the same as other medications prescribed for you. Read the directions carefully, and ask your doctor or other care provider to review them with you.            Procedures and tests performed during your visit     Drug abuse screen 6 urine (tox)      Orders Needing Specimen Collection     None      Pending Results     No orders found from 2/20/2018 to 2/23/2018.            Pending Culture Results     No orders found from 2/20/2018 to 2/23/2018.            Pending Results Instructions     If you had any lab results that were not finalized at the time of your Discharge, you can call the ED Lab Result RN at [phone]. You will be contacted by this team for any positive Lab results or changes in treatment. The nurses are available 7 days a week from 10A to 6:30P.  You can leave a message 24 hours per day and they will return your call.        Thank you for choosing [facility]       Thank you for choosing [facility] for your care. Our goal is always to provide you with excellent care. Hearing back from our patients is  "one way we can continue to improve our services. Please take a few minutes to complete the written survey that you may receive in the mail after you visit with us. Thank you!        [employer] Information     [email or web address] lets you send messages to your doctor, view your test results, renew your prescriptions, schedule appointments and more. To sign up, go to [email or web address] . Click on \"Log in\" on the left side of the screen, which will take you to the Welcome page. Then click on \"Sign up Now\" on the right side of the page.     You will be asked to enter the access code listed below, as well as some personal information. Please follow the directions to create your username and password.     Your access code is: [email or web address]  Expires: 3/4/2018 10:08 AM     Your access code will  in 90 days. If you need help or a new code, please call your [facility] clinic or [phone].        Care EveryWhere ID     This is your Care EveryWhere ID. This could be used by other organizations to access your [facility] medical records  [id number]        Equal Access to Services     PERRI WALTON : Hadsunitha Petersen, sumaya montelongo, jaycob davis, sharonda hunt. So [facility] [phone].    ATENCIÓN: Si habla español, tiene a thomas disposición servicios gratuitos de asistencia lingüística. Uriel al [phone].    We comply with applicable federal civil rights laws and [place] laws. We do not discriminate on the basis of race, color, national origin, age, disability, sex, sexual orientation, or gender identity.            After Visit Summary       This is your record. Keep this with you and show to your community pharmacist(s) and doctor(s) at your next visit.                  "

## 2018-02-22 NOTE — ED AVS SNAPSHOT
[facility], [place], Emergency Department    [address]    [facility] [address]    Phone:  [phone]    Fax:  [phone]                                       Todd S Aschoff   MRN: [id number]    Department:  [facility], Emergency Department   Date of Visit:  2/22/2018           After Visit Summary Signature Page     I have received my discharge instructions, and my questions have been answered. I have discussed any challenges I see with this plan with the nurse or doctor.    ..........................................................................................................................................  Patient/Patient Representative Signature      ..........................................................................................................................................  Patient Representative Print Name and Relationship to Patient    ..................................................               ................................................  Date                                            Time    ..........................................................................................................................................  Reviewed by Signature/Title    ...................................................              ..............................................  Date                                                            Time

## 2018-02-23 NOTE — ED NOTES
Bed: HW04  Expected date:   Expected time:   Means of arrival:   Comments:  [facility]    61 yo M  Mental health

## 2018-02-23 NOTE — ED PROVIDER NOTES
"    [facility] EMERGENCY DEPARTMENT ([facility])    2/22/18     ED 10 8:57 PM   History     Chief Complaint   Patient presents with     Suicidal     denies plans, since he was \"started on Gabapentin everything got worse'     Homicidal     denies plans     The history is provided by the patient and medical records.     Todd S Aschoff is a 62 year old male who presents for psychiatric evaluation. He has a history of severe depression on Effexor XL 75 mg x 3, chronic back pain, hip problems etc. He Has Been on Flexeril, Diazepam for his musculoskeletal pain.  He states his regular psychiatrist is on hiatus and this substitute psychiatrist at [facility] \"seems to be pushing pills on me.\" His substitute psychiatrist is trying to get him off diazepam and got in contact with patient's primary care  doctor who switched him to Neurontin.  Patient states that he has been in Neurontin in the past and at that time had adverse effect of emotional lability.  He states he became irritable, angry and aggressive on that medication at that time.  He did start the Neurontin at behest of his psychiatrist's wishes and has been on this for a week.  He has not had much improvement to his pain with this, but has been finding himself more irritable and feeling like he is under \"a lot of pressure.\"  He feels that this irritability is similar to his prior experience of taking Neurontin.  Patient lives at home with wife, their daughter, her  and grandkids. He states his grandchildren are loud at times. Today their loudness \"got to me and I blew up.\"  He went outside to get away from everyone, called police himself because he was afraid that he was going to hurt them or himself.  He states that he did not feel in control of himself at that particular moment and was scared as he does not usually have anger outbursts. He was brought here for evaluation.  He wants to discontinue the neurontin.  He asks when Neurontin will be out of his " system. Last dose was at 4pm this evening.  Here he states he is no longer angry or overwhelmed, feels safe to go home. He has spoken to his daughter and wife via phone. He denies any homicidal ideation at this time.     I have reviewed the Medications, Allergies, Past Medical and Surgical History, and Social History in the Epic system.    Review of Systems   Constitutional: Negative.    Gastrointestinal: Negative.    Psychiatric/Behavioral: Positive for agitation and dysphoric mood. Negative for self-injury and suicidal ideas. The patient is nervous/anxious.    All other systems reviewed and are negative.      Physical Exam   BP: (!) 145/93  Heart Rate: 74  Temp: 97.6  F (36.4  C)  Resp: 16  SpO2: 96 %      Physical Exam   Constitutional: He is oriented to person, place, and time. He appears well-developed and well-nourished. No distress.   HENT:   Head: Atraumatic.   Eyes: Pupils are equal, round, and reactive to light.   Cardiovascular: Normal rate, regular rhythm and normal heart sounds.    Pulmonary/Chest: Effort normal and breath sounds normal.   Neurological: He is alert and oriented to person, place, and time.   Psychiatric: His speech is normal and behavior is normal. Judgment normal. His mood appears anxious. Cognition and memory are normal.   Nursing note and vitals reviewed.      ED Course     ED Course     Procedures        Medications   hydrOXYzine (ATARAX) tablet 50 mg (50 mg Oral Given 2/22/18 2123)            Labs Ordered and Resulted from Time of ED Arrival Up to the Time of Departure from the ED   DRUG ABUSE SCREEN 6 CHEM DEP URINE ([facility]) - Abnormal; Notable for the following:        Result Value    Benzodiazepine Qual Urine Positive (*)     All other components within normal limits            Assessments & Plan (with Medical Decision Making)   62-year-old male with adverse reaction to gabapentin. He had this in the past a few years ago when he was on this medication. It was started I believe  almost a week ago for chronic back pain.  What his symptoms are when on this medication is emotional lability.  He becomes agitated and angry and has difficulty controlling his emotions. He says this is not his baseline. He does have a psychiatrist and is treated for depression on Effexor.  He says this is the same reaction he had to this medication in the past.  He recognized that his reaction to minimal stress at home was inappropriate and he actually called the police and he was brought here.  Here he is calm, is not delusional, paranoid hallucinating, or suicidal.  He recognizes the side effects of the medication. He is interested and willing to go back home. I am comfortable with this given the fact that he self-reported his emotional outburst. I do not think it is likely that he would harm himself or anyone else. He plans to go home and go to sleep. Based on the kinetics of gabapentin that should be washed out of the system in about 24 hours. He is aware of this. He was mildly agitated here and given a dose of hydroxyzine.  His wife is going to come pick him up.    This part of the document was transcribed by Nola Jesus, Medical Scribe.      I have reviewed the nursing notes.    I have reviewed the findings, diagnosis, plan and need for follow up with the patient.    Discharge Medication List as of 2/22/2018  9:41 PM          Final diagnoses:   Adverse drug effect, initial encounter       2/22/2018   [facility], [place], EMERGENCY DEPARTMENT     Garland Fernandez MD  02/22/18 0348

## 2018-02-23 NOTE — DISCHARGE INSTRUCTIONS
Discontinue gabapentin and contact the prescriber about your reaction to this medication  Return if you are not feeling safe at home  It will take about 24 hours for the gabapentin to wash out of your system

## 2018-03-07 ENCOUNTER — ANTICOAGULATION THERAPY VISIT (OUTPATIENT)
Dept: ANTICOAGULATION | Facility: CLINIC | Age: 63
End: 2018-03-07
Payer: COMMERCIAL

## 2018-03-07 DIAGNOSIS — Z95.2 AORTIC VALVE PROSTHESIS PRESENT: ICD-10-CM

## 2018-03-07 DIAGNOSIS — I48.91 ATRIAL FIBRILLATION (H): ICD-10-CM

## 2018-03-07 DIAGNOSIS — Z79.01 LONG-TERM (CURRENT) USE OF ANTICOAGULANTS: ICD-10-CM

## 2018-03-07 LAB — INR POINT OF CARE: 1.9 (ref 0.86–1.14)

## 2018-03-07 PROCEDURE — 85610 PROTHROMBIN TIME: CPT | Mod: QW

## 2018-03-07 PROCEDURE — 99207 ZZC NO CHARGE NURSE ONLY: CPT

## 2018-03-07 PROCEDURE — 36416 COLLJ CAPILLARY BLOOD SPEC: CPT

## 2018-03-07 NOTE — MR AVS SNAPSHOT
Todd S Aschoff   3/7/2018 3:45 PM   Anticoagulation Therapy Visit    Description:  62 year old male   Provider[facility]  [facility]   Department:  [facility]           INR as of 3/7/2018     Today's INR 1.9!      Anticoagulation Summary as of 3/7/2018     INR goal 2.5-3.5   Today's INR 1.9!   Full instructions 3/7: 10 mg; 3/9: 7.5 mg; 3/12: 7.5 mg; 3/16: 7.5 mg; 3/19: 7.5 mg; Otherwise 5 mg every day   Next INR check 3/16/2018    Indications   Long-term (current) use of anticoagulants [Z79.01] [Z79.01]  Aortic valve prosthesis present [Z95.2]  Atrial fibrillation (H) [I48.91]         Your next Anticoagulation Clinic appointment(s)     Mar 16, 2018 11:45 AM CDT   [facility]   [facility]    [address] [facility] [address]  [facility] [address] [phone]              Contact Numbers     [facility] Phone Numbers:  Anticoagulation Clinic Appointments : [phone]  Anticoagulation Nurse: [phone]         March 2018 Details    Sun Mon Tue Wed Thu Fri Sat         1               2               3                 4               5               6               7      10 mg   See details      8      5 mg         9      7.5 mg         10      5 mg           11      5 mg         12      7.5 mg         13      5 mg         14      5 mg         15      5 mg         16            17                 18               19               20               21               22               23               24                 25               26               27               28               29               30               31                Date Details   03/07 This INR check       Date of next INR:  3/16/2018         How to take your warfarin dose     To take:  5 mg Take 1 of the 5 mg tablets.    To take:  7.5 mg Take 1.5 of the 5 mg tablets.    To take:  10 mg Take 2 of the 5 mg tablets.

## 2018-03-07 NOTE — PROGRESS NOTES
ANTICOAGULATION FOLLOW-UP CLINIC VISIT    Patient Name:  Todd S Aschoff  Date:  3/7/2018  Contact Type:  Face to Face    SUBJECTIVE:     Patient Findings     Positives Unexplained INR or factor level change           OBJECTIVE    INR Protime   Date Value Ref Range Status   03/07/2018 1.9 (A) 0.86 - 1.14 Final       ASSESSMENT / PLAN  INR assessment SUB    Recheck INR In: 10 DAYS    INR Location Clinic      Anticoagulation Summary as of 3/7/2018     INR goal 2.5-3.5   Today's INR 1.9!   Maintenance plan 5 mg (5 mg x 1) every day   Full instructions 3/7: 10 mg; 3/9: 7.5 mg; 3/12: 7.5 mg; 3/16: 7.5 mg; 3/19: 7.5 mg; Otherwise 5 mg every day   Weekly total 35 mg   Plan last modified Amanda Wheeler RN (7/19/2017)   Next INR check 3/16/2018   Priority INR   Target end date     Indications   Long-term (current) use of anticoagulants [Z79.01] [Z79.01]  Aortic valve prosthesis present [Z95.2]  Atrial fibrillation (H) [I48.91]         Anticoagulation Episode Summary     INR check location     Preferred lab     Send INR reminders to [facility]    Comments       Anticoagulation Care Providers     Provider Role Specialty Phone number    Obdulio Ingram MD Responsible Internal Medicine [phone]            See the Encounter Report to view Anticoagulation Flowsheet and Dosing Calendar (Go to Encounters tab in chart review, and find the Anticoagulation Therapy Visit)    Dosage adjustment made based on physician directed care plan.    Amanda Wheeler RN

## 2018-03-09 DIAGNOSIS — M62.830 BACK MUSCLE SPASM: ICD-10-CM

## 2018-03-13 RX ORDER — DIAZEPAM 5 MG
TABLET ORAL
Qty: 270 TABLET | Refills: 0 | Status: SHIPPED | OUTPATIENT
Start: 2018-03-13 | End: 2018-06-07

## 2018-03-16 ENCOUNTER — ANTICOAGULATION THERAPY VISIT (OUTPATIENT)
Dept: ANTICOAGULATION | Facility: CLINIC | Age: 63
End: 2018-03-16
Payer: COMMERCIAL

## 2018-03-16 DIAGNOSIS — Z79.01 LONG-TERM (CURRENT) USE OF ANTICOAGULANTS: ICD-10-CM

## 2018-03-16 DIAGNOSIS — Z95.2 AORTIC VALVE PROSTHESIS PRESENT: ICD-10-CM

## 2018-03-16 LAB — INR POINT OF CARE: 3 (ref 0.86–1.14)

## 2018-03-16 PROCEDURE — 99207 ZZC NO CHARGE NURSE ONLY: CPT

## 2018-03-16 PROCEDURE — 85610 PROTHROMBIN TIME: CPT | Mod: QW

## 2018-03-16 PROCEDURE — 36416 COLLJ CAPILLARY BLOOD SPEC: CPT

## 2018-03-16 NOTE — PROGRESS NOTES
ANTICOAGULATION FOLLOW-UP CLINIC VISIT    Patient Name:  Todd S Aschoff  Date:  3/16/2018  Contact Type:  Face to Face    SUBJECTIVE:     Patient Findings     Positives No Problem Findings           OBJECTIVE    INR Protime   Date Value Ref Range Status   03/16/2018 3.0 (A) 0.86 - 1.14 Final       ASSESSMENT / PLAN  INR assessment THER    Recheck INR In: 10 DAYS    INR Location Clinic      Anticoagulation Summary as of 3/16/2018     INR goal 2.5-3.5   Today's INR 3.0   Maintenance plan 7.5 mg (5 mg x 1.5) on Mon, Fri; 5 mg (5 mg x 1) all other days   Full instructions 3/16: 7.5 mg; 3/19: 7.5 mg; Otherwise 7.5 mg on Mon, Fri; 5 mg all other days   Weekly total 40 mg   Plan last modified Yadi Wells, RN (3/16/2018)   Next INR check 3/26/2018   Priority INR   Target end date     Indications   Long-term (current) use of anticoagulants [Z79.01] [Z79.01]  Aortic valve prosthesis present [Z95.2]  Atrial fibrillation (H) [I48.91]         Anticoagulation Episode Summary     INR check location     Preferred lab     Send INR reminders to [facility]    Comments       Anticoagulation Care Providers     Provider Role Specialty Phone number    Obdulio Ingram MD Responsible Internal Medicine [phone]            See the Encounter Report to view Anticoagulation Flowsheet and Dosing Calendar (Go to Encounters tab in chart review, and find the Anticoagulation Therapy Visit)    Dosage adjustment made based on physician directed care plan.    Yadi Wells RN

## 2018-03-16 NOTE — MR AVS SNAPSHOT
Todd S Aschoff   3/16/2018 11:45 AM   Anticoagulation Therapy Visit    Description:  62 year old male   Provider[facility]   Department:  [facility]           INR as of 3/16/2018     Today's INR 3.0      Anticoagulation Summary as of 3/16/2018     INR goal 2.5-3.5   Today's INR 3.0   Full instructions 3/16: 7.5 mg; 3/19: 7.5 mg; Otherwise 7.5 mg on Mon, Fri; 5 mg all other days   Next INR check 3/26/2018    Indications   Long-term (current) use of anticoagulants [Z79.01] [Z79.01]  Aortic valve prosthesis present [Z95.2]  Atrial fibrillation (H) [I48.91]         Your next Anticoagulation Clinic appointment(s)     Mar 26, 2018  1:00 PM CDT   [facility] with [facility]   [facility] ([facility])    [address] [facility] [address]  [facility] [id number] [phone]              Contact Numbers     [facility] Phone Numbers:  Anticoagulation Clinic Appointments : [phone]  Anticoagulation Nurse: [phone]         March 2018 Details    Sun Mon Tue Wed Thu Fri Sat         1               2               3                 4               5               6               7               8               9               10                 11               12               13               14               15               16      7.5 mg   See details      17      5 mg           18      5 mg         19      7.5 mg         20      5 mg         21      5 mg         22      5 mg         23      7.5 mg         24      5 mg           25      5 mg         26            27               28               29               30               31                Date Details   03/16 This INR check       Date of next INR:  3/26/2018         How to take your warfarin dose     To take:  5 mg Take 1 of the 5 mg tablets.    To take:  7.5 mg Take 1.5 of the 5 mg tablets.

## 2018-03-26 ENCOUNTER — ANTICOAGULATION THERAPY VISIT (OUTPATIENT)
Dept: ANTICOAGULATION | Facility: CLINIC | Age: 63
End: 2018-03-26
Payer: COMMERCIAL

## 2018-03-26 DIAGNOSIS — Z79.01 LONG-TERM (CURRENT) USE OF ANTICOAGULANTS: ICD-10-CM

## 2018-03-26 DIAGNOSIS — Z95.2 AORTIC VALVE PROSTHESIS PRESENT: ICD-10-CM

## 2018-03-26 LAB — INR POINT OF CARE: 3.5 (ref 0.86–1.14)

## 2018-03-26 PROCEDURE — 36416 COLLJ CAPILLARY BLOOD SPEC: CPT

## 2018-03-26 PROCEDURE — 85610 PROTHROMBIN TIME: CPT | Mod: QW

## 2018-03-26 PROCEDURE — 99207 ZZC NO CHARGE NURSE ONLY: CPT

## 2018-03-26 NOTE — MR AVS SNAPSHOT
Todd S Aschoff   3/26/2018 1:00 PM   Anticoagulation Therapy Visit    Description:  62 year old male   Provider[facility]   Department:  [facility]           INR as of 3/26/2018     Today's INR 3.5      Anticoagulation Summary as of 3/26/2018     INR goal 2.5-3.5   Today's INR 3.5   Full instructions 3/26: 5 mg; 4/2: 5 mg; Otherwise 7.5 mg on Mon, Fri; 5 mg all other days   Next INR check 4/5/2018    Indications   Long-term (current) use of anticoagulants [Z79.01] [Z79.01]  Aortic valve prosthesis present [Z95.2]  Atrial fibrillation (H) [I48.91]         Your next Anticoagulation Clinic appointment(s)     Apr 05, 2018  1:00 PM CDT   [facility] with [facility]   [facility]    [address] [facility] [address]  [facility] [id number] [phone]              Contact Numbers     [facility] Phone Numbers:  Anticoagulation Clinic Appointments : [phone]  Anticoagulation Nurse: [phone]         March 2018 Details    Sun Mon Tue Wed Thu Fri Sat         1               2               3                 4               5               6               7               8               9               10                 11               12               13               14               15               16               17                 18               19               20               21               22               23               24                 25               26      5 mg   See details      27      5 mg         28      5 mg         29      5 mg         30      7.5 mg         31      5 mg          Date Details   03/26 This INR check               How to take your warfarin dose     To take:  5 mg Take 1 of the 5 mg tablets.    To take:  7.5 mg Take 1.5 of the 5 mg tablets.           April 2018 Details    Sun Mon Tue Wed Thu Fri Sat     1      5 mg         2      5 mg         3      5 mg         4      5  mg         5            6               7                 8               9               10               11               12               13               14                 15               16               17               18               19               20               21                 22               23               24               25               26               27               28                 29               30                     Date Details   No additional details    Date of next INR:  4/5/2018         How to take your warfarin dose     To take:  5 mg Take 1 of the 5 mg tablets.

## 2018-03-26 NOTE — PROGRESS NOTES
ANTICOAGULATION FOLLOW-UP CLINIC VISIT    Patient Name:  Todd S Aschoff  Date:  3/26/2018  Contact Type:  Face to Face    SUBJECTIVE:     Patient Findings     Positives No Problem Findings           OBJECTIVE    INR Protime   Date Value Ref Range Status   03/26/2018 3.5 (A) 0.86 - 1.14 Final       ASSESSMENT / PLAN  INR assessment THER    Recheck INR In: 10 DAYS    INR Location Clinic      Anticoagulation Summary as of 3/26/2018     INR goal 2.5-3.5   Today's INR 3.5   Maintenance plan 7.5 mg (5 mg x 1.5) on Mon, Fri; 5 mg (5 mg x 1) all other days   Full instructions 3/26: 5 mg; 4/2: 5 mg; Otherwise 7.5 mg on Mon, Fri; 5 mg all other days   Weekly total 40 mg   Plan last modified Yadi Wells RN (3/16/2018)   Next INR check 4/5/2018   Priority INR   Target end date     Indications   Long-term (current) use of anticoagulants [Z79.01] [Z79.01]  Aortic valve prosthesis present [Z95.2]  Atrial fibrillation (H) [I48.91]         Anticoagulation Episode Summary     INR check location     Preferred lab     Send INR reminders to [facility]    Comments       Anticoagulation Care Providers     Provider Role Specialty Phone number    Obdulio Ingram MD [facility] Internal Medicine [phone]            See the Encounter Report to view Anticoagulation Flowsheet and Dosing Calendar (Go to Encounters tab in chart review, and find the Anticoagulation Therapy Visit)    Dosage adjustment made based on physician directed care plan.    Lucina Andrew RN

## 2018-03-29 ENCOUNTER — TELEPHONE (OUTPATIENT)
Dept: INTERNAL MEDICINE | Facility: CLINIC | Age: 63
End: 2018-03-29

## 2018-03-29 NOTE — TELEPHONE ENCOUNTER
"Disability Parking Certificate  Patient dropped it off at the   He had a \"lifetime\" one, but his child used it so the police took it  He's wondering if  would fill out another for him  G's yellow folder  Fax back  "

## 2018-04-04 DIAGNOSIS — I48.20 CHRONIC ATRIAL FIBRILLATION (H): ICD-10-CM

## 2018-04-05 ENCOUNTER — ANTICOAGULATION THERAPY VISIT (OUTPATIENT)
Dept: ANTICOAGULATION | Facility: CLINIC | Age: 63
End: 2018-04-05
Payer: COMMERCIAL

## 2018-04-05 DIAGNOSIS — Z95.2 AORTIC VALVE PROSTHESIS PRESENT: ICD-10-CM

## 2018-04-05 DIAGNOSIS — Z79.01 LONG-TERM (CURRENT) USE OF ANTICOAGULANTS: ICD-10-CM

## 2018-04-05 LAB — INR POINT OF CARE: 2.8 (ref 0.86–1.14)

## 2018-04-05 PROCEDURE — 85610 PROTHROMBIN TIME: CPT | Mod: QW

## 2018-04-05 PROCEDURE — 99207 ZZC NO CHARGE NURSE ONLY: CPT

## 2018-04-05 PROCEDURE — 36416 COLLJ CAPILLARY BLOOD SPEC: CPT

## 2018-04-05 NOTE — PROGRESS NOTES
ANTICOAGULATION FOLLOW-UP CLINIC VISIT    Patient Name:  Todd S Aschoff  Date:  4/5/2018  Contact Type:  Face to Face    SUBJECTIVE:     Patient Findings     Positives Change in medications (Pt reports on day 3 of steroid for back pain. has 3 days left.), Inflammation (Pt reports increased back pain, unable to see surgeon until June, has bulging disc), No Problem Findings           OBJECTIVE    INR Protime   Date Value Ref Range Status   04/05/2018 2.8 (A) 0.86 - 1.14 Final       ASSESSMENT / PLAN  INR assessment THER    Recheck INR In: 10 DAYS    INR Location Clinic      Anticoagulation Summary as of 4/5/2018     INR goal 2.5-3.5   Today's INR 2.8   Maintenance plan 7.5 mg (5 mg x 1.5) on Mon, Fri; 5 mg (5 mg x 1) all other days   Full instructions 7.5 mg on Mon, Fri; 5 mg all other days   Weekly total 40 mg   No change documented Lucina Andrew RN   Plan last modified Yadi Wells RN (3/16/2018)   Next INR check 4/16/2018   Priority INR   Target end date     Indications   Long-term (current) use of anticoagulants [Z79.01] [Z79.01]  Aortic valve prosthesis present [Z95.2]  Atrial fibrillation (H) [I48.91]         Anticoagulation Episode Summary     INR check location     Preferred lab     Send INR reminders to [facility]    Comments       Anticoagulation Care Providers     Provider Role Specialty Phone number    Obdulio Ingram MD [facility] Internal Medicine [phone]            See the Encounter Report to view Anticoagulation Flowsheet and Dosing Calendar (Go to Encounters tab in chart review, and find the Anticoagulation Therapy Visit)    Dosage adjustment made based on physician directed care plan.    Lucina Andrew, RN

## 2018-04-05 NOTE — TELEPHONE ENCOUNTER
Rythmol      Last Written Prescription Date:  4-28-17  Last Fill Quantity: 270,   # refills: 3  Last Office Visit: 2-9-18  Future Office visit:       Routing refill request to provider for review/approval because:  Drug not on the [facility], [facility] or [facility] refill protocol or controlled substance    Please advise, thanks.

## 2018-04-05 NOTE — MR AVS SNAPSHOT
Todd S Aschoff   4/5/2018 1:00 PM   Anticoagulation Therapy Visit    Description:  62 year old male   Provider[facility]   Department:  [facility]           INR as of 4/5/2018     Today's INR 2.8      Anticoagulation Summary as of 4/5/2018     INR goal 2.5-3.5   Today's INR 2.8   Full instructions 7.5 mg on Mon, Fri; 5 mg all other days   Next INR check 4/16/2018    Indications   Long-term (current) use of anticoagulants [Z79.01] [Z79.01]  Aortic valve prosthesis present [Z95.2]  Atrial fibrillation (H) [I48.91]         Your next Anticoagulation Clinic appointment(s)     Apr 16, 2018  1:15 PM CDT   [facility]   [facility]    [address]  [facility] [id number] [phone]              Contact Numbers     [facility] Phone Numbers:  Anticoagulation Clinic Appointments : [phone]  Anticoagulation Nurse: [phone]         April 2018 Details    Sun Mon Tue Wed Thu Fri Sat     1               2               3               4               5      5 mg   See details      6      7.5 mg         7      5 mg           8      5 mg         9      7.5 mg         10      5 mg         11      5 mg         12      5 mg         13      7.5 mg         14      5 mg           15      5 mg         16            17               18               19               20               21                 22               23               24               25               26               27               28                 29               30                     Date Details   04/05 This INR check       Date of next INR:  4/16/2018         How to take your warfarin dose     To take:  5 mg Take 1 of the 5 mg tablets.    To take:  7.5 mg Take 1.5 of the 5 mg tablets.

## 2018-04-10 RX ORDER — PROPAFENONE HYDROCHLORIDE 150 MG/1
150 TABLET, COATED ORAL EVERY 8 HOURS
Qty: 270 TABLET | Refills: 0 | Status: SHIPPED | OUTPATIENT
Start: 2018-04-10 | End: 2018-08-02

## 2018-04-12 NOTE — TELEPHONE ENCOUNTER
Pt called back to find out the status of the form he dropped off for Dr. Ingram to complete. Please advise ASA at [phone] (ok to leave detailed message).    Clare Diaz

## 2018-04-13 DIAGNOSIS — E78.5 HYPERLIPIDEMIA LDL GOAL <130: ICD-10-CM

## 2018-04-13 RX ORDER — SIMVASTATIN 40 MG
40 TABLET ORAL AT BEDTIME
Qty: 30 TABLET | Refills: 0 | Status: SHIPPED | OUTPATIENT
Start: 2018-04-13 | End: 2018-05-23

## 2018-04-13 NOTE — TELEPHONE ENCOUNTER
I will need more information from the patient on the nature of his disability, specifically which number on the form applies to him and for what condition.   Would like to forward this to the triage RN--please try to obtain this information, or if too complex, he can schedule an appt.

## 2018-04-13 NOTE — TELEPHONE ENCOUNTER
Completed form for one year extension.   We can discuss further at his next office appointment.   Please advise pt.

## 2018-04-13 NOTE — TELEPHONE ENCOUNTER
"Requested Prescriptions   Pending Prescriptions Disp Refills     simvastatin (ZOCOR) 40 MG tablet 90 tablet 3     Sig: Take 1 tablet (40 mg) by mouth At Bedtime    Statins Protocol Failed    4/13/2018  4:09 PM       Failed - LDL on file in past 12 months    Recent Labs   Lab Test  02/22/17   0911   LDL  99            Passed - No abnormal creatine kinase in past 12 months    No lab results found.            Passed - Recent (12 mo) or future (30 days) visit within the authorizing provider's specialty    Patient had office visit in the last 12 months or has a visit in the next 30 days with authorizing provider or within the authorizing provider's specialty.  See \"Patient Info\" tab in inbasket, or \"Choose Columns\" in Meds & Orders section of the refill encounter.           Passed - Patient is age 18 or older      Medication is being filled for 1 time refill only due to:  Patient needs labs FLP.      "

## 2018-04-16 ENCOUNTER — ANTICOAGULATION THERAPY VISIT (OUTPATIENT)
Dept: ANTICOAGULATION | Facility: CLINIC | Age: 63
End: 2018-04-16
Payer: COMMERCIAL

## 2018-04-16 DIAGNOSIS — Z95.2 AORTIC VALVE PROSTHESIS PRESENT: ICD-10-CM

## 2018-04-16 DIAGNOSIS — Z79.01 LONG-TERM (CURRENT) USE OF ANTICOAGULANTS: ICD-10-CM

## 2018-04-16 LAB — INR POINT OF CARE: 3.6 (ref 0.86–1.14)

## 2018-04-16 PROCEDURE — 99207 ZZC NO CHARGE NURSE ONLY: CPT

## 2018-04-16 PROCEDURE — 85610 PROTHROMBIN TIME: CPT | Mod: QW

## 2018-04-16 PROCEDURE — 36416 COLLJ CAPILLARY BLOOD SPEC: CPT

## 2018-04-16 NOTE — PROGRESS NOTES
ANTICOAGULATION FOLLOW-UP CLINIC VISIT    Patient Name:  Todd S Aschoff  Date:  4/16/2018  Contact Type:  Face to Face    SUBJECTIVE:     Patient Findings     Positives Change in diet/appetite (Pt reports decreased green intake, will resume usual diet. )           OBJECTIVE    INR Protime   Date Value Ref Range Status   04/16/2018 3.6 (A) 0.86 - 1.14 Final       ASSESSMENT / PLAN  INR assessment THER    Recheck INR In: 2 WEEKS    INR Location Clinic      Anticoagulation Summary as of 4/16/2018     INR goal 2.5-3.5   Today's INR 3.6!   Maintenance plan 7.5 mg (5 mg x 1.5) on Mon, Fri; 5 mg (5 mg x 1) all other days   Full instructions 7.5 mg on Mon, Fri; 5 mg all other days   Weekly total 40 mg   No change documented Lucina Andrew, RN   Plan last modified Yadi Wells RN (3/16/2018)   Next INR check 4/30/2018   Priority INR   Target end date     Indications   Long-term (current) use of anticoagulants [Z79.01] [Z79.01]  Aortic valve prosthesis present [Z95.2]  Atrial fibrillation (H) [I48.91]         Anticoagulation Episode Summary     INR check location     Preferred lab     Send INR reminders to [facility]    Comments       Anticoagulation Care Providers     Provider Role Specialty Phone number    Obdulio Ingram MD [facility] Internal Medicine [phone]            See the Encounter Report to view Anticoagulation Flowsheet and Dosing Calendar (Go to Encounters tab in chart review, and find the Anticoagulation Therapy Visit)    Dosage adjustment made based on physician directed care plan.    Lucina Andrew, RN

## 2018-04-16 NOTE — MR AVS SNAPSHOT
Todd S Aschoff   4/16/2018 1:15 PM   Anticoagulation Therapy Visit    Description:  62 year old male   [facility]   Department:  [facility]           INR as of 4/16/2018     Today's INR 3.6!      Anticoagulation Summary as of 4/16/2018     INR goal 2.5-3.5   Today's INR 3.6!   Full instructions 7.5 mg on Mon, Fri; 5 mg all other days   Next INR check 4/30/2018    Indications   Long-term (current) use of anticoagulants [Z79.01] [Z79.01]  Aortic valve prosthesis present [Z95.2]  Atrial fibrillation (H) [I48.91]         Your next Anticoagulation Clinic appointment(s)     Apr 30, 2018  1:30 PM CDT   [facility] with [facility]   [facility] ([facility]    [address] [facility] [address]  [facility] [id number] [phone]              Contact Numbers     [facility] Phone Numbers:  Anticoagulation Clinic Appointments : [phone]  Anticoagulation Nurse: [phone]         April 2018 Details    Sun Mon Tue Wed Thu Fri Sat     1               2               3               4               5               6               7                 8               9               10               11               12               13               14                 15               16      7.5 mg   See details      17      5 mg         18      5 mg         19      5 mg         20      7.5 mg         21      5 mg           22      5 mg         23      7.5 mg         24      5 mg         25      5 mg         26      5 mg         27      7.5 mg         28      5 mg           29      5 mg         30                  Date Details   04/16 This INR check       Date of next INR:  4/30/2018         How to take your warfarin dose     To take:  5 mg Take 1 of the 5 mg tablets.    To take:  7.5 mg Take 1.5 of the 5 mg tablets.

## 2018-04-17 ENCOUNTER — TRANSFERRED RECORDS (OUTPATIENT)
Dept: HEALTH INFORMATION MANAGEMENT | Facility: CLINIC | Age: 63
End: 2018-04-17

## 2018-04-23 DIAGNOSIS — J31.0 CHRONIC RHINITIS: ICD-10-CM

## 2018-04-23 RX ORDER — CETIRIZINE HYDROCHLORIDE 10 MG/1
TABLET ORAL
Qty: 30 TABLET | Refills: 0 | Status: SHIPPED | OUTPATIENT
Start: 2018-04-23 | End: 2018-05-28

## 2018-04-23 NOTE — TELEPHONE ENCOUNTER
"Requested Prescriptions   Pending Prescriptions Disp Refills     cetirizine (ZYRTEC) 10 MG tablet 30 tablet 3    Antihistamines Protocol Passed    4/23/2018 11:00 AM       Passed - Patient is 3-64 years of age    Apply weight-based dosing for peds patients age 3 - 12 years of age.    Forward request to provider for patients under the age of 3 or over the age of 64.         Passed - Recent (12 mo) or future (30 days) visit within the authorizing provider's specialty    Patient had office visit in the last 12 months or has a visit in the next 30 days with authorizing provider or within the authorizing provider's specialty.  See \"Patient Info\" tab in inbasket, or \"Choose Columns\" in Meds & Orders section of the refill encounter.            Prescription approved per [facility] Refill Protocol.    "

## 2018-04-27 ENCOUNTER — TELEPHONE (OUTPATIENT)
Dept: INTERNAL MEDICINE | Facility: CLINIC | Age: 63
End: 2018-04-27

## 2018-04-27 DIAGNOSIS — M54.40 CHRONIC LOW BACK PAIN WITH SCIATICA, SCIATICA LATERALITY UNSPECIFIED, UNSPECIFIED BACK PAIN LATERALITY: ICD-10-CM

## 2018-04-27 DIAGNOSIS — G89.29 CHRONIC LOW BACK PAIN WITH SCIATICA, SCIATICA LATERALITY UNSPECIFIED, UNSPECIFIED BACK PAIN LATERALITY: ICD-10-CM

## 2018-04-27 NOTE — TELEPHONE ENCOUNTER
Call received from patient stating that he was on Lyrica previously for nerve pain after hip surgery. States it needed a formulary exception which was never done and he never pushed it at that time. States he is having pain and would like to restart the Lyrica. Please order if in agreement and we will wait for a response from pharmacy re: coverage.

## 2018-04-30 DIAGNOSIS — M62.838 MUSCLE SPASM: ICD-10-CM

## 2018-04-30 RX ORDER — CYCLOBENZAPRINE HCL 10 MG
10 TABLET ORAL 3 TIMES DAILY PRN
Qty: 270 TABLET | Refills: 3 | Status: SHIPPED | OUTPATIENT
Start: 2018-04-30 | End: 2018-08-02

## 2018-04-30 NOTE — TELEPHONE ENCOUNTER
flexeril      Last Written Prescription Date:  4/28/17  Last Fill Quantity: 270,   # refills: 3  Last Office Visit: 2/9/18  Future Office visit:       Routing refill request to provider for review/approval because:  Drug not on the [facility], [facility] or [facility] refill protocol or controlled substance

## 2018-05-02 ENCOUNTER — NURSE TRIAGE (OUTPATIENT)
Dept: NURSING | Facility: CLINIC | Age: 63
End: 2018-05-02

## 2018-05-02 ENCOUNTER — TELEPHONE (OUTPATIENT)
Dept: INTERNAL MEDICINE | Facility: CLINIC | Age: 63
End: 2018-05-02

## 2018-05-02 DIAGNOSIS — Z95.2 AORTIC VALVE PROSTHESIS PRESENT: ICD-10-CM

## 2018-05-02 DIAGNOSIS — Z79.01 LONG TERM CURRENT USE OF ANTICOAGULANT THERAPY: ICD-10-CM

## 2018-05-02 RX ORDER — PREGABALIN 100 MG/1
100 CAPSULE ORAL 3 TIMES DAILY
Qty: 270 CAPSULE | Refills: 0 | Status: SHIPPED | OUTPATIENT
Start: 2018-05-02 | End: 2018-08-02

## 2018-05-02 NOTE — TELEPHONE ENCOUNTER
He would require Lovenox bridging. See telephone note dated 11/7/2017.   Please advise [facility] and the patient. Will also forward to anticoagulation RN.

## 2018-05-02 NOTE — TELEPHONE ENCOUNTER
Prior Authorization Retail Medication Request    Medication/Dose: Lyrica  ICD code (if different than what is on RX):  M54.40 and G89.29  Previously Tried and Failed:  Unknown    Rationale: unknown    Insurance Name:  [facility]     Insurance ID:  [id number]  Disabled    Pharmacy Information (if different than what is on RX)  Name:  [facility]  Phone:  [phone]    CoverMyMeds suggest:  KEY [id number]

## 2018-05-02 NOTE — TELEPHONE ENCOUNTER
Left message for patient to call office.  He has used lovenox in the past.  Unsure if he has some at home or if he will need refill.  Weight has been stable since lovenox was last refilled (about 300 lbs).    Left message for Samira at [facility] that pt will need lovenox bridging while holding warfarin.  Amanda Wheeler RN

## 2018-05-02 NOTE — TELEPHONE ENCOUNTER
[facility] called. Stated she received message below, just needs info faxed to her at [phone]. Relayed I will fax this note

## 2018-05-02 NOTE — TELEPHONE ENCOUNTER
Patient saw Dr. Sloan at [facility] and is trying to get scheduled for a steroid epidural injection but [facility] needs to know if patient can be off Coumadin for 5 days prior to procedure before they will schedule it.  Does he need to be bridged?  Please contact [facility] Ph. [phone] with response and let pt know also.  KYA Shabazz R.N.

## 2018-05-02 NOTE — TELEPHONE ENCOUNTER
[facility] pharmacist Paulino calling, states that the prescription that was sent to them as pasted below, does not equal the strength that the PCP wants.    enoxaparin (LOVENOX) 120 MG/0.8ML injection 20 Syringe 1 5/2/2018  No      Sig: Inject 0.9 mLs (135 mg) Subcutaneous every 12 hours     Caller states that they would need an order for Lovenox 150mg/1ml, to get the 0.9 mls (135mg) subcutaneous every 12 hours.     Patient's primary PCP is Dr. Ingram at the [facility]. Dr. Jody Tanner is on call for this clinic and was paged at 6:18pm via the page  to call this RN directly.   Dr. Tanner was repaged at 6:30pm via the page  to call this RN directly.   Dr. Duncanson called back and advised that it is ok to order the Lovenox 150mg/1ml with the same dosing that the provider originally ordered.  This order was verbally called to Lin Bob.  Medication note added.     Advised to call back if further questions or concerns.      Reason for Disposition    Pharmacy calling with prescription questions and triager unable to answer question    Protocols used: MEDICATION QUESTION CALL-ADULT-

## 2018-05-02 NOTE — TELEPHONE ENCOUNTER
Pt calling back.  Notified him of MD's message and need for lovenox bridging.  He does not have the procedure scheduled yet.  He will contact the INR clinic to schedule an INR check the day prior to the procedure.  Amanda Wheeler RN

## 2018-05-02 NOTE — TELEPHONE ENCOUNTER
Pt returning call.  Advised of PCP's message below.  Transferred to INR nurse.    Left message on nurse's vm @ [facility] to call back re: PCP's message below.

## 2018-05-03 NOTE — TELEPHONE ENCOUNTER
Central Prior Authorization Team   Phone: [phone]      PA Initiation    Medication: Lyrica-Initiated  Insurance Company: [employer] - Phone [phone] Fax [phone]  Pharmacy Filling the Rx: [id number] - [place], [place] - [address]  Filling Pharmacy Phone: [phone]  Filling Pharmacy Fax:    Start Date: 5/3/2018

## 2018-05-07 ENCOUNTER — ANTICOAGULATION THERAPY VISIT (OUTPATIENT)
Dept: ANTICOAGULATION | Facility: CLINIC | Age: 63
End: 2018-05-07
Payer: COMMERCIAL

## 2018-05-07 DIAGNOSIS — Z95.2 AORTIC VALVE PROSTHESIS PRESENT: ICD-10-CM

## 2018-05-07 DIAGNOSIS — Z79.01 LONG-TERM (CURRENT) USE OF ANTICOAGULANTS: ICD-10-CM

## 2018-05-07 LAB — INR POINT OF CARE: 1.2 (ref 0.86–1.14)

## 2018-05-07 PROCEDURE — 99207 ZZC NO CHARGE NURSE ONLY: CPT

## 2018-05-07 PROCEDURE — 85610 PROTHROMBIN TIME: CPT | Mod: QW

## 2018-05-07 PROCEDURE — 36416 COLLJ CAPILLARY BLOOD SPEC: CPT

## 2018-05-07 NOTE — MR AVS SNAPSHOT
Todd S Aschoff   5/7/2018 9:15 AM   Anticoagulation Therapy Visit    Description:  62 year old male   Provider[facility]  [facility]   Department:  [facility]           INR as of 5/7/2018     Today's INR 1.2!      Anticoagulation Summary as of 5/7/2018     INR goal 2.5-3.5   Today's INR 1.2!   Full instructions 5/7: Hold; 5/10: 7.5 mg; Otherwise 7.5 mg on Mon, Fri; 5 mg all other days   Next INR check 5/11/2018    Indications   Long-term (current) use of anticoagulants [Z79.01] [Z79.01]  Aortic valve prosthesis present [Z95.2]  Atrial fibrillation (H) [I48.91]         Your next Anticoagulation Clinic appointment(s)     May 11, 2018 10:45 AM CDT   [facility] with [facility]   [facility]    [address]  [facility] [id number] [phone]              Contact Numbers     [facility] Phone Numbers:  Anticoagulation Clinic Appointments : [phone]  Anticoagulation Nurse: [phone]         May 2018 Details    Sun Mon Tue Wed Thu Fri Sat       1               2               3               4               5                 6               7      Hold   See details      8      5 mg         9      5 mg         10      7.5 mg         11            12                 13               14               15               16               17               18               19                 20               21               22               23               24               25               26                 27               28               29               30               31                  Date Details   05/07 This INR check       Date of next INR:  5/11/2018         How to take your warfarin dose     To take:  5 mg Take 1 of the 5 mg tablets.    To take:  7.5 mg Take 1.5 of the 5 mg tablets.    Hold Do not take your warfarin dose. See the Details table to the right for additional instructions.

## 2018-05-07 NOTE — PROGRESS NOTES
ANTICOAGULATION FOLLOW-UP CLINIC VISIT    Patient Name:  Todd S Aschoff  Date:  5/7/2018  Contact Type:  Face to Face    SUBJECTIVE:     Patient Findings     Positives Intentional hold of therapy (pt reports he began holding warfarin 5/4/18 and started Lovenox 5/4/18. Pt will have a back injection  5/8/18.  )           OBJECTIVE    INR Protime   Date Value Ref Range Status   05/07/2018 1.2 (A) 0.86 - 1.14 Final       ASSESSMENT / PLAN  INR assessment SUB    Recheck INR In: 4 DAYS    INR Location Clinic      Anticoagulation Summary as of 5/7/2018     INR goal 2.5-3.5   Today's INR 1.2!   Maintenance plan 7.5 mg (5 mg x 1.5) on Mon, Fri; 5 mg (5 mg x 1) all other days   Full instructions 5/7: Hold; 5/10: 7.5 mg; Otherwise 7.5 mg on Mon, Fri; 5 mg all other days   Weekly total 40 mg   Plan last modified Yadi Wells RN (3/16/2018)   Next INR check 5/11/2018   Priority INR   Target end date     Indications   Long-term (current) use of anticoagulants [Z79.01] [Z79.01]  Aortic valve prosthesis present [Z95.2]  Atrial fibrillation (H) [I48.91]         Anticoagulation Episode Summary     INR check location     Preferred lab     Send INR reminders to [facility]    Comments       Anticoagulation Care Providers     Provider Role Specialty Phone number    Obdulio Ingram MD [facility] Internal Medicine [phone]            See the Encounter Report to view Anticoagulation Flowsheet and Dosing Calendar (Go to Encounters tab in chart review, and find the Anticoagulation Therapy Visit)    Dosage adjustment made based on physician directed care plan.    Lucina Andrew RN

## 2018-05-07 NOTE — TELEPHONE ENCOUNTER
Called and spoke with Anjelica at [facility] to check on the status of the PA request, it is still under review.

## 2018-05-10 NOTE — TELEPHONE ENCOUNTER
Prior Authorization Approval    Authorization Effective Date: 5/7/2018  Authorization Expiration Date: 5/7/2019  Medication: Lyrica-APPROVED  Approved Dose/Quantity:   Reference #:     Insurance Company: [employer] - Phone [phone] Fax [phone]  Expected CoPay:       CoPay Card Available:      Foundation Assistance Needed:    Which Pharmacy is filling the prescription (Not needed for infusion/clinic administered): [id number] - [place], [place] - [address]  Pharmacy Notified: Yes  Patient Notified: No    Pharmacy will notify patient when medication is ready.

## 2018-05-11 ENCOUNTER — ANTICOAGULATION THERAPY VISIT (OUTPATIENT)
Dept: ANTICOAGULATION | Facility: CLINIC | Age: 63
End: 2018-05-11
Payer: COMMERCIAL

## 2018-05-11 DIAGNOSIS — Z95.2 AORTIC VALVE PROSTHESIS PRESENT: ICD-10-CM

## 2018-05-11 DIAGNOSIS — Z79.01 LONG-TERM (CURRENT) USE OF ANTICOAGULANTS: ICD-10-CM

## 2018-05-11 DIAGNOSIS — I48.91 ATRIAL FIBRILLATION (H): ICD-10-CM

## 2018-05-11 LAB — INR POINT OF CARE: 1.2 (ref 0.86–1.14)

## 2018-05-11 PROCEDURE — 99207 ZZC NO CHARGE NURSE ONLY: CPT

## 2018-05-11 PROCEDURE — 85610 PROTHROMBIN TIME: CPT | Mod: QW

## 2018-05-11 PROCEDURE — 36416 COLLJ CAPILLARY BLOOD SPEC: CPT

## 2018-05-11 NOTE — MR AVS SNAPSHOT
Todd S Aschoff   5/11/2018 10:45 AM   Anticoagulation Therapy Visit    Description:  62 year old male   Provider[facility]   Department:  [facility]           INR as of 5/11/2018     Today's INR 1.2!      Anticoagulation Summary as of 5/11/2018     INR goal 2.5-3.5   Today's INR 1.2!   Full instructions 5/11: 10 mg; 5/12: 7.5 mg; 5/13: 7.5 mg; Otherwise 7.5 mg on Mon, Fri; 5 mg all other days   Next INR check 5/15/2018    Indications   Long-term (current) use of anticoagulants [Z79.01] [Z79.01]  Aortic valve prosthesis present [Z95.2]  Atrial fibrillation (H) [I48.91]         Your next Anticoagulation Clinic appointment(s)     May 15, 2018  2:00 PM CDT   [facility] with [facility]   [facility]    [address] [facility] [address]  [facility] [address] [phone]              Contact Numbers     [facility] Phone Numbers:  Anticoagulation Clinic Appointments : [phone]  Anticoagulation Nurse: [phone]         May 2018 Details    Sun Mon Tue Wed Thu Fri Sat       1               2               3               4               5                 6               7               8               9               10               11      10 mg   See details      12      7.5 mg           13      7.5 mg         14      7.5 mg         15            16               17               18               19                 20               21               22               23               24               25               26                 27               28               29               30               31                  Date Details   05/11 This INR check       Date of next INR:  5/15/2018         How to take your warfarin dose     To take:  5 mg Take 1 of the 5 mg tablets.    To take:  7.5 mg Take 1.5 of the 5 mg tablets.    To take:  10 mg Take 2 of the 5 mg tablets.

## 2018-05-11 NOTE — PROGRESS NOTES
ANTICOAGULATION FOLLOW-UP CLINIC VISIT    Patient Name:  Todd S Aschoff  Date:  5/11/2018  Contact Type:  Face to Face    SUBJECTIVE:     Patient Findings     Positives Intentional hold of therapy (Intentionally held for colonoscopy and epidural injection.  Pt has been using Lovenox bridging and will continue lovenox until next INR check.)           OBJECTIVE    INR Protime   Date Value Ref Range Status   05/11/2018 1.2 (A) 0.86 - 1.14 Final       ASSESSMENT / PLAN  INR assessment SUB    Recheck INR In: 4 DAYS    INR Location Clinic      Anticoagulation Summary as of 5/11/2018     INR goal 2.5-3.5   Today's INR 1.2!   Maintenance plan 7.5 mg (5 mg x 1.5) on Mon, Fri; 5 mg (5 mg x 1) all other days   Full instructions 5/11: 10 mg; 5/12: 7.5 mg; 5/13: 7.5 mg; Otherwise 7.5 mg on Mon, Fri; 5 mg all other days   Weekly total 40 mg   Plan last modified Yadi Wells RN (3/16/2018)   Next INR check 5/15/2018   Priority INR   Target end date     Indications   Long-term (current) use of anticoagulants [Z79.01] [Z79.01]  Aortic valve prosthesis present [Z95.2]  Atrial fibrillation (H) [I48.91]         Anticoagulation Episode Summary     INR check location     Preferred lab     Send INR reminders to [facility]    Comments       Anticoagulation Care Providers     Provider Role Specialty Phone number    Obdulio Ingram MD [facility] Internal Medicine [phone]            See the Encounter Report to view Anticoagulation Flowsheet and Dosing Calendar (Go to Encounters tab in chart review, and find the Anticoagulation Therapy Visit)    Dosage adjustment made based on physician directed care plan.    Amanda Wheeler RN

## 2018-05-15 ENCOUNTER — ANTICOAGULATION THERAPY VISIT (OUTPATIENT)
Dept: ANTICOAGULATION | Facility: CLINIC | Age: 63
End: 2018-05-15
Payer: COMMERCIAL

## 2018-05-15 DIAGNOSIS — Z95.2 AORTIC VALVE PROSTHESIS PRESENT: ICD-10-CM

## 2018-05-15 DIAGNOSIS — Z79.01 CURRENT USE OF LONG TERM ANTICOAGULATION: ICD-10-CM

## 2018-05-15 DIAGNOSIS — Z79.01 LONG-TERM (CURRENT) USE OF ANTICOAGULANTS: ICD-10-CM

## 2018-05-15 LAB — INR POINT OF CARE: 2.8 (ref 0.86–1.14)

## 2018-05-15 PROCEDURE — 36416 COLLJ CAPILLARY BLOOD SPEC: CPT

## 2018-05-15 PROCEDURE — 99207 ZZC NO CHARGE NURSE ONLY: CPT

## 2018-05-15 PROCEDURE — 85610 PROTHROMBIN TIME: CPT | Mod: QW

## 2018-05-15 RX ORDER — WARFARIN SODIUM 5 MG/1
TABLET ORAL
Qty: 100 TABLET | Refills: 1 | Status: SHIPPED | OUTPATIENT
Start: 2018-05-15 | End: 2018-11-02

## 2018-05-15 NOTE — MR AVS SNAPSHOT
Todd S Aschoff   5/15/2018 2:00 PM   Anticoagulation Therapy Visit    Description:  62 year old male   Provider[facility]  [facility]   Department:  [facility]           INR as of 5/15/2018     Today's INR 2.8      Anticoagulation Summary as of 5/15/2018     INR goal 2.5-3.5   Today's INR 2.8   Full instructions 7.5 mg on Mon, Fri; 5 mg all other days   Next INR check 5/18/2018    Indications   Long-term (current) use of anticoagulants [Z79.01] [Z79.01]  Aortic valve prosthesis present [Z95.2]  Atrial fibrillation (H) [I48.91]         Your next Anticoagulation Clinic appointment(s)     May 18, 2018 11:15 AM CDT   [facility]ticoagulation [facility] with [facility]   [facility]    [address] [facility] [address]  [facility] [id number] [phone]              Contact Numbers     [facility] Phone Numbers:  Anticoagulation Clinic Appointments : [phone]  Anticoagulation Nurse: [phone]         May 2018 Details    Sun Mon Tue Wed Thu Fri Sat       1               2               3               4               5                 6               7               8               9               10               11               12                 13               14               15      5 mg   See details      16      5 mg         17      5 mg         18            19                 20               21               22               23               24               25               26                 27               28               29               30               31                  Date Details   05/15 This INR check       Date of next INR:  5/18/2018         How to take your warfarin dose     To take:  5 mg Take 1 of the 5 mg tablets.    To take:  7.5 mg Take 1.5 of the 5 mg tablets.

## 2018-05-15 NOTE — PROGRESS NOTES
ANTICOAGULATION FOLLOW-UP CLINIC VISIT    Patient Name:  Todd S Aschoff  Date:  5/15/2018  Contact Type:  Face to Face    SUBJECTIVE:     Patient Findings     Positives Change in medications (Pt reports began taking Lyrica last week, this should not interfere with warfarin. )    Comments Pt reports having eye problems, states words are running together when reading, has eye appt today.            OBJECTIVE    INR Protime   Date Value Ref Range Status   05/15/2018 2.8 (A) 0.86 - 1.14 Final       ASSESSMENT / PLAN  INR assessment THER    Recheck INR In: 3 DAYS    INR Location Clinic      Anticoagulation Summary as of 5/15/2018     INR goal 2.5-3.5   Today's INR 2.8   Maintenance plan 7.5 mg (5 mg x 1.5) on Mon, Fri; 5 mg (5 mg x 1) all other days   Full instructions 7.5 mg on Mon, Fri; 5 mg all other days   Weekly total 40 mg   No change documented Lucina Andrew, RN   Plan last modified Yadi Wells RN (3/16/2018)   Next INR check 5/18/2018   Priority INR   Target end date     Indications   Long-term (current) use of anticoagulants [Z79.01] [Z79.01]  Aortic valve prosthesis present [Z95.2]  Atrial fibrillation (H) [I48.91]         Anticoagulation Episode Summary     INR check location     Preferred lab     Send [facility] reminders to [facility]    Comments       Anticoagulation Care Providers     Provider Role Specialty Phone number    Obdulio Ingram MD [facility] Internal Medicine [phone]            See the Encounter Report to view Anticoagulation Flowsheet and Dosing Calendar (Go to Encounters tab in chart review, and find the Anticoagulation Therapy Visit)    Dosage adjustment made based on physician directed care plan.    Lucina Andrew, RN

## 2018-05-18 DIAGNOSIS — E03.9 ACQUIRED HYPOTHYROIDISM: ICD-10-CM

## 2018-05-19 DIAGNOSIS — J31.0 CHRONIC RHINITIS: ICD-10-CM

## 2018-05-19 DIAGNOSIS — M62.830 BACK MUSCLE SPASM: ICD-10-CM

## 2018-05-21 RX ORDER — LEVOTHYROXINE SODIUM 150 UG/1
TABLET ORAL
Qty: 90 TABLET | Refills: 2 | OUTPATIENT
Start: 2018-05-21

## 2018-05-21 RX ORDER — DIAZEPAM 5 MG
TABLET ORAL
Qty: 270 TABLET | Refills: 0 | OUTPATIENT
Start: 2018-05-21

## 2018-05-21 RX ORDER — AZELASTINE 1 MG/ML
SPRAY, METERED NASAL
Qty: 30 ML | Refills: 0 | Status: SHIPPED | OUTPATIENT
Start: 2018-05-21 | End: 2018-08-02

## 2018-05-21 NOTE — TELEPHONE ENCOUNTER
"Duplicate       Requested Prescriptions   Pending Prescriptions Disp Refills     levothyroxine (SYNTHROID/LEVOTHROID) 150 MCG tablet [Pharmacy Med Name: Levothyroxine Sodium Oral Tablet 150 MCG] 90 tablet 2     Sig: TAKE ONE TABLET BY MOUTH ONE TIME DAILY    Thyroid Protocol Failed    5/18/2018  1:10 PM       Failed - Normal TSH on file in past 12 months    Recent Labs   Lab Test  04/28/17   1254   TSH  4.40*             Passed - Patient is 12 years or older       Passed - Recent (12 mo) or future (30 days) visit within the authorizing provider's specialty    Patient had office visit in the last 12 months or has a visit in the next 30 days with authorizing provider or within the authorizing provider's specialty.  See \"Patient Info\" tab in inbasket, or \"Choose Columns\" in Meds & Orders section of the refill encounter.              "

## 2018-05-23 DIAGNOSIS — E78.5 HYPERLIPIDEMIA LDL GOAL <130: ICD-10-CM

## 2018-05-23 RX ORDER — SIMVASTATIN 40 MG
TABLET ORAL
Qty: 30 TABLET | Refills: 0 | Status: SHIPPED | OUTPATIENT
Start: 2018-05-23 | End: 2018-06-19

## 2018-05-23 NOTE — LETTER
[facility]  [address], [address]  [place], [place]  [address]                                            TEL:[phone]  FAX:[phone]      Nicko SHINE Aschoff  [address] [facility] [place] [address]      May 23, 2018    Dear Nicko     We have received a refill request from your pharmacy, however, we were only able to provide a one time fill because you are over-due for a fasting lab appointment. Please call [phone] to schedule an appointment before you are due for your next refill.      Thank you,     Anjelica, Triage RN

## 2018-05-24 NOTE — TELEPHONE ENCOUNTER
"Requested Prescriptions   Pending Prescriptions Disp Refills     simvastatin (ZOCOR) 40 MG tablet [Pharmacy Med Name: Simvastatin Oral Tablet 40 MG] 30 tablet 0     Sig: Take 1 tablet (40 mg) by mouth At Bedtime    Statins Protocol Failed    5/23/2018  4:10 PM       Failed - LDL on file in past 12 months    Recent Labs   Lab Test  02/22/17   0911   LDL  99            Passed - No abnormal creatine kinase in past 12 months    No lab results found.            Passed - Recent (12 mo) or future (30 days) visit within the authorizing provider's specialty    Patient had office visit in the last 12 months or has a visit in the next 30 days with authorizing provider or within the authorizing provider's specialty.  See \"Patient Info\" tab in inbasket, or \"Choose Columns\" in Meds & Orders section of the refill encounter.           Passed - Patient is age 18 or older          Medication is being filled for 1 time refill only due to:  Patient needs labs flp.   Order in chart.    Letter mailed.    "

## 2018-05-25 ENCOUNTER — ANTICOAGULATION THERAPY VISIT (OUTPATIENT)
Dept: ANTICOAGULATION | Facility: CLINIC | Age: 63
End: 2018-05-25
Payer: COMMERCIAL

## 2018-05-25 DIAGNOSIS — I48.91 ATRIAL FIBRILLATION (H): ICD-10-CM

## 2018-05-25 DIAGNOSIS — Z95.2 AORTIC VALVE PROSTHESIS PRESENT: ICD-10-CM

## 2018-05-25 DIAGNOSIS — Z79.01 LONG-TERM (CURRENT) USE OF ANTICOAGULANTS: ICD-10-CM

## 2018-05-25 LAB — INR POINT OF CARE: 2.7 (ref 0.86–1.14)

## 2018-05-25 PROCEDURE — 99207 ZZC NO CHARGE NURSE ONLY: CPT

## 2018-05-25 PROCEDURE — 36416 COLLJ CAPILLARY BLOOD SPEC: CPT

## 2018-05-25 PROCEDURE — 85610 PROTHROMBIN TIME: CPT | Mod: QW

## 2018-05-25 NOTE — PROGRESS NOTES
ANTICOAGULATION FOLLOW-UP CLINIC VISIT    Patient Name:  Todd S Aschoff  Date:  5/25/2018  Contact Type:  Face to Face    SUBJECTIVE:     Patient Findings     Positives No Problem Findings           OBJECTIVE    INR Protime   Date Value Ref Range Status   05/25/2018 2.7 (A) 0.86 - 1.14 Final       ASSESSMENT / PLAN  INR assessment THER    Recheck INR In: 3 WEEKS    INR Location Clinic      Anticoagulation Summary as of 5/25/2018     INR goal 2.5-3.5   Today's INR 2.7   Warfarin maintenance plan 7.5 mg (5 mg x 1.5) on Mon, Fri; 5 mg (5 mg x 1) all other days   Full warfarin instructions 7.5 mg on Mon, Fri; 5 mg all other days   Weekly warfarin total 40 mg   No change documented Amanda Wheeler RN   Plan last modified Yadi Wells RN (3/16/2018)   Next INR check 6/15/2018   Priority INR   Target end date     Indications   Long-term (current) use of anticoagulants [Z79.01] [Z79.01]  Aortic valve prosthesis present [Z95.2]  Atrial fibrillation (H) [I48.91]         Anticoagulation Episode Summary     INR check location     Preferred lab     Send INR reminders to [facility]    Comments       Anticoagulation Care Providers     Provider Role Specialty Phone number    Obdulio Ingram MD [facility] Internal Medicine [phone]            See the Encounter Report to view Anticoagulation Flowsheet and Dosing Calendar (Go to Encounters tab in chart review, and find the Anticoagulation Therapy Visit)    Dosage adjustment made based on physician directed care plan.    Amanda Wheeler, RN

## 2018-05-28 DIAGNOSIS — J31.0 CHRONIC RHINITIS: ICD-10-CM

## 2018-05-29 RX ORDER — CETIRIZINE HYDROCHLORIDE 10 MG/1
TABLET ORAL
Qty: 90 TABLET | Refills: 0 | Status: SHIPPED | OUTPATIENT
Start: 2018-05-29 | End: 2020-05-29

## 2018-05-29 NOTE — TELEPHONE ENCOUNTER
"Pt shced for appt on 8/2    Requested Prescriptions   Pending Prescriptions Disp Refills     cetirizine (ZYRTEC) 10 MG tablet [Pharmacy Med Name: Cetirizine HCl Oral Tablet 10 MG] 30 tablet 0     Sig: TAKE ONE TABLET BY MOUTH ONCE DAILY AS NEEDED    Antihistamines Protocol Passed    5/28/2018  7:01 AM       Passed - Patient is 3-64 years of age    Apply weight-based dosing for peds patients age 3 - 12 years of age.    Forward request to provider for patients under the age of 3 or over the age of 64.         Passed - Recent (12 mo) or future (30 days) visit within the authorizing provider's specialty    Patient had office visit in the last 12 months or has a visit in the next 30 days with authorizing provider or within the authorizing provider's specialty.  See \"Patient Info\" tab in inbasket, or \"Choose Columns\" in Meds & Orders section of the refill encounter.              "

## 2018-05-31 ENCOUNTER — TRANSFERRED RECORDS (OUTPATIENT)
Dept: HEALTH INFORMATION MANAGEMENT | Facility: CLINIC | Age: 63
End: 2018-05-31

## 2018-06-07 DIAGNOSIS — Z79.01 LONG TERM CURRENT USE OF ANTICOAGULANT THERAPY: ICD-10-CM

## 2018-06-07 DIAGNOSIS — M62.830 BACK MUSCLE SPASM: ICD-10-CM

## 2018-06-07 DIAGNOSIS — Z95.2 AORTIC VALVE PROSTHESIS PRESENT: ICD-10-CM

## 2018-06-07 NOTE — TELEPHONE ENCOUNTER
Patient calls. [facility] in [place] is going to provide 2 spinal injections for him.     Requesting refill for Lovenox 150mg to take for 5 days prior to injections - takes 120mg BID. The pharmacy dispensed the 150mg syringe last time and this worked well for him. Order pended.    [facility] also needs authorization sent to them states okay for patient to receive the spinal injections and that he will be taking Lovenox 5 days prior to injection. Fax authorization to [phone].    Patient also needs refill for Diazepam.   Requested Prescriptions   Pending Prescriptions Disp Refills     diazepam (VALIUM) 5 MG tablet  Last Written Prescription Date:  3/13/18  Last Fill Quantity: 270,  # refills: 0   Last office visit: 2/9/2018 with prescribing provider:  yes   Future Office Visit:   Next 5 appointments (look out 90 days)     Aug 02, 2018  4:20 PM CDT   [facility]    [address]  [facility] [address]   [phone]                 270 tablet 0    There is no refill protocol information for this order    Signed CSA on file.     Routing refill request to provider for review/approval because:  Drug not on the [facility] refill protocol

## 2018-06-07 NOTE — LETTER
[facility]  [address], [address]  [place], [place]  [address]                                            TEL:[phone]  FAX:[phone]        Todd S Aschoff  [address] [facility] [address]      2018    In regards to:   Nicko Kristofer     -57      Pt is under my care currently and I authorize this patient to receive the spinal injections. He will be taking Lovenox 5 days prior to injection, and having his INR checked as well the day before the injection.  Any questions feel free to call [phone]        Sincerely,          Obdulio Ingram M.D.

## 2018-06-08 ENCOUNTER — TELEPHONE (OUTPATIENT)
Dept: INTERNAL MEDICINE | Facility: CLINIC | Age: 63
End: 2018-06-08

## 2018-06-08 RX ORDER — DIAZEPAM 5 MG
TABLET ORAL
Qty: 270 TABLET | Refills: 0 | Status: SHIPPED | OUTPATIENT
Start: 2018-06-08 | End: 2018-08-02

## 2018-06-08 NOTE — TELEPHONE ENCOUNTER
Call received from [facility] pharmacy requesting to change Lovenox rx to 120mg/0.8 ml syringes. Current order is for 150mg/ml with instructions to take 0.8 ml or 120 mg. Verbal Ok given to change rx.

## 2018-06-08 NOTE — TELEPHONE ENCOUNTER
Faxed diazepam rx to [facility] & let pt know as well. He needs a letter stating what SHYANN Orozco said below as well, then faxed to # below. Letter completed, signed by PCP, and faxed

## 2018-06-09 DIAGNOSIS — R41.3 MEMORY DIFFICULTIES: ICD-10-CM

## 2018-06-12 DIAGNOSIS — R97.20 ELEVATED PROSTATE SPECIFIC ANTIGEN (PSA): ICD-10-CM

## 2018-06-12 PROCEDURE — 84153 ASSAY OF PSA TOTAL: CPT | Performed by: UROLOGY

## 2018-06-12 PROCEDURE — 36415 COLL VENOUS BLD VENIPUNCTURE: CPT | Performed by: UROLOGY

## 2018-06-13 LAB — PSA SERPL-MCNC: 3.84 UG/L (ref 0–4)

## 2018-06-14 ENCOUNTER — OFFICE VISIT (OUTPATIENT)
Dept: UROLOGY | Facility: CLINIC | Age: 63
End: 2018-06-14
Payer: COMMERCIAL

## 2018-06-14 VITALS — BODY MASS INDEX: 39.76 KG/M2 | HEART RATE: 64 BPM | WEIGHT: 300 LBS | OXYGEN SATURATION: 95 % | HEIGHT: 73 IN

## 2018-06-14 DIAGNOSIS — N39.44 BED WETTING: Primary | ICD-10-CM

## 2018-06-14 LAB
ALBUMIN UR-MCNC: NEGATIVE MG/DL
APPEARANCE UR: CLEAR
BILIRUB UR QL STRIP: NEGATIVE
COLOR UR AUTO: YELLOW
GLUCOSE UR STRIP-MCNC: NEGATIVE MG/DL
HGB UR QL STRIP: NEGATIVE
KETONES UR STRIP-MCNC: NEGATIVE MG/DL
LEUKOCYTE ESTERASE UR QL STRIP: NEGATIVE
NITRATE UR QL: NEGATIVE
PH UR STRIP: 5.5 PH (ref 5–7)
RESIDUAL VOLUME (RV) (EXTERNAL): 5
SOURCE: NORMAL
SP GR UR STRIP: 1.02 (ref 1–1.03)
UROBILINOGEN UR STRIP-ACNC: 1 EU/DL (ref 0.2–1)

## 2018-06-14 PROCEDURE — 99212 OFFICE O/P EST SF 10 MIN: CPT | Performed by: UROLOGY

## 2018-06-14 PROCEDURE — 81003 URINALYSIS AUTO W/O SCOPE: CPT | Performed by: UROLOGY

## 2018-06-14 RX ORDER — DONEPEZIL HYDROCHLORIDE 10 MG/1
TABLET, FILM COATED ORAL
Qty: 90 TABLET | Refills: 2 | Status: SHIPPED | OUTPATIENT
Start: 2018-06-14 | End: 2018-08-02

## 2018-06-14 ASSESSMENT — PAIN SCALES - GENERAL: PAINLEVEL: SEVERE PAIN (7)

## 2018-06-14 NOTE — PROGRESS NOTES
"Todd Aschoff is a pleasant 62-year-old male with a neurogenic bladder. He takes myrbetriq 50 mg daily but is having more bedwetting. He's been dealing with a herniated disc and spinal \"mass\" through [facility] in [place] - Dariusz Zavala and Felix.  He is having no dysuria or hematuria. He has a normal urinalysis this morning and a 5 cc postvoid residual.  PSA is 3.84 which is stable. Biopsies last year were all benign but showed some chronic inflammation. Patient has a history of ASAP of the prostate several years ago  Other past medical history: Father with prostate cancer, history of alcohol dependence, aortic valve prosthesis, atrial fibrillation, chronic prostatitis, BPH, chronic pain, aortic dissection, headaches, heart murmur, history of spinal cord injury, major depression, hyperlipidemia, osteoarthritis, obesity, sciatica, hypothyroidism, nonsmoker  Medications: Long list reviewed  Allergies: None  Exam: Normal appearance, abdominal obesity. Alert and oriented, normocephalic, normal respirations, neuro grossly intact. Normal sphincter tone, no rectal mass or impaction, symmetric and benign feeling prostate, normal seminal vesicles  Assessment: Neurogenic bladder/overactive bladder-patient empties well but cannot store urine.  Plan: Continue myrbetriq. Not a candidate for InterStim if he has a correctable spine issue. See me yearly with PSA and for digital rectal exam    "

## 2018-06-14 NOTE — MR AVS SNAPSHOT
After Visit Summary   6/14/2018    Todd S Aschoff    MRN: [id number]           Patient Information     Date Of Birth          1955        Visit Information        Provider Department      6/14/2018 8:00 AM Geraldo Gonzales MD [facility]        Today's Diagnoses     Bed wetting    -  1       Follow-ups after your visit        Follow-up notes from your care team     Return in about 1 year (around 6/14/2019) for PSA.      Your next 10 appointments already scheduled     Alexey 15, 2018 11:30 AM CDT   Anticoagulation Visit with  ANTICOAGULATION CLINIC   [facility]    [address]  [address]  [facility] [address]   [phone]            Aug 02, 2018  4:20 PM CDT   PHYSICAL with Obdulio Ingram MD   [facility]    [address]  [facility] [id number] [phone]              Who to contact     If you have questions or need follow up information about today's clinic visit or your schedule please contact [facility]OLOG[facility] directly at [phone].  Normal or non-critical lab and imaging results will be communicated to you by MyChart, letter or phone within 4 business days after the clinic has received the results. If you do not hear from us within 7 days, please contact the clinic through MyChart or phone. If you have a critical or abnormal lab result, we will notify you by phone as soon as possible.  Submit refill requests through [employer] or call your pharmacy and they will forward the refill request to us. Please allow 3 business days for your refill to be completed.          Additional Information About Your Visit        MyChart Information     [employer] lets you send messages to your doctor, view your test results, renew your prescriptions, schedule appointments and more. To sign up, go to  [email or web address] . Click on \"Log in\" on the left side of the screen, which will take you to the Welcome page. Then click on \"Sign up Now\" on the right side of the page.     You will be asked to enter the access code listed below, as well as some personal information. Please follow the directions to create your username and password.     Your access code is: [email or web address]  Expires: 2018  3:00 PM     Your access code will  in 90 days. If you need help or a new code, please call your [facility] clinic or [phone].        Care EveryWhere ID     This is your Care EveryWhere ID. This could be used by other organizations to access your [facility] medical records  [id number]        Your Vitals Were     Pulse Height Pulse Oximetry BMI (Body Mass Index)          64 1.854 m (6' 1\") 95% 39.58 kg/m2         Blood Pressure from Last 3 Encounters:   18 132/81   18 130/72   18 134/86    Weight from Last 3 Encounters:   18 136.1 kg (300 lb)   18 136.1 kg (300 lb)   18 135.5 kg (298 lb 11.6 oz)              We Performed the Following     Bladder scan     UA without Microscopic        Primary Care Provider Office Phone # Fax #    Obdulio Ingram [phone]       [address]  [facility] [address]        Equal Access to Services     [facility] AH: Hadii aad ku hadasho Soomaali, waaxda luqadaha, qaybta kaalmada adeegyada, sharonda molina . So [facility] [phone].    ATENCIÓN: Si habla español, tiene a thomas disposición servicios gratuitos de asistencia lingüística. Llame al [phone].    We comply with applicable federal civil rights laws and [place] laws. We do not discriminate on the basis of race, color, national origin, age, disability, sex, sexual orientation, or gender identity.            Thank you!     Thank you for choosing [facility] [place]  for your care. Our goal is always to " provide you with excellent care. Hearing back from our patients is one way we can continue to improve our services. Please take a few minutes to complete the written survey that you may receive in the mail after your visit with us. Thank you!             Your Updated Medication List - Protect others around you: Learn how to safely use, store and throw away your medicines at www.disposemymeds.org.          This list is accurate as of 6/14/18  8:28 AM.  Always use your most recent med list.                   Brand Name Dispense Instructions for use Diagnosis    * acetaminophen 650 MG 8 hour tablet    ACETAMINOPHEN 8 HOUR    250 tablet    Take 650 mg by mouth 2 times daily    Chronic low back pain       * MAPAP ARTHRITIS PAIN 650 MG CR tablet   Generic drug:  acetaminophen     180 tablet    TAKE 650 MG BY MOUTH 2 TIMES DAILY    Chronic low back pain       azelastine 0.1 % spray    ASTELIN    30 mL    USE 2 SPRAYS IN NOSTRIL 2 TIMES DAILY AS NEEDED.    Chronic rhinitis       cetirizine 10 MG tablet    zyrTEC    90 tablet    TAKE ONE TABLET BY MOUTH ONCE DAILY AS NEEDED    Chronic rhinitis       cyclobenzaprine 10 MG tablet    FLEXERIL    270 tablet    Take 1 tablet (10 mg) by mouth 3 times daily as needed for muscle spasms    Muscle spasm       diazepam 5 MG tablet    VALIUM    270 tablet    TAKE ONE TABLET BY MOUTH EVERY EIGHT HOURS AS NEEDED FOR ANXIETY OR MUSCLE SPASMS    Back muscle spasm       donepezil 10 MG tablet    ARICEPT    90 tablet    Take 1 tablet (10 mg) by mouth At Bedtime    Memory difficulties       * enoxaparin 120 MG/0.8ML injection    LOVENOX    20 Syringe    Inject 0.9 mLs (135 mg) Subcutaneous every 12 hours    Aortic valve prosthesis present, Long term current use of anticoagulant therapy       * enoxaparin 150 MG/ML injection    LOVENOX    10 Syringe    Inject 0.8 mLs (120 mg) Subcutaneous every 12 hours    Aortic valve prosthesis present, Long term current use of anticoagulant therapy        guanFACINE 1 MG tablet    TENEX    90 tablet    Take 1 tablet (1 mg) by mouth At Bedtime    Major depressive disorder, recurrent episode, in full remission (H)       isometheptene-acetaminophen-dichloralphenazone -100 MG per capsule    MIDRIN    30 capsule    Take 1 capsule by mouth 4 times daily as needed    Headache(784.0)       levothyroxine 150 MCG tablet    SYNTHROID/LEVOTHROID    90 tablet    Need to schedule an office visit with Dr Ingram, either for physical exam or med check.    Acquired hypothyroidism       mirabegron 50 MG 24 hr tablet    MYRBETRIQ    90 tablet    Take 1 tablet (50 mg) by mouth daily    Overactive bladder       NEURONTIN PO      Take 100 mg by mouth 2 times daily        pregabalin 100 MG capsule    LYRICA    270 capsule    Take 1 capsule (100 mg) by mouth 3 times daily Do not take if sleepy/sedated.    Chronic low back pain with sciatica, sciatica laterality unspecified, unspecified back pain laterality       propafenone 150 MG Tabs tablet    RYTHMOL    270 tablet    Take 1 tablet (150 mg) by mouth every 8 hours    Chronic atrial fibrillation (H)       senna-docusate 8.6-50 MG per tablet    SENOKOT-S;PERICOLACE    60 tablet    Take 1 tablet by mouth 2 times daily as needed for constipation    Constipation, unspecified constipation type       simvastatin 40 MG tablet    ZOCOR    30 tablet    Take 1 tablet (40 mg) by mouth At Bedtime    Hyperlipidemia LDL goal <130       traZODone 50 MG tablet    DESYREL    90 tablet    Take 1 tablet (50 mg) by mouth At Bedtime    Primary insomnia       venlafaxine 150 MG Tb24 24 hr tablet    EFFEXOR-ER    90 each    Take 1 tablet (150 mg) by mouth daily (with breakfast)    Major depressive disorder, recurrent episode, in full remission (H)       warfarin 5 MG tablet    COUMADIN    100 tablet    Take 1 1/2 tablets (7.5 mg) by mouth Mon and Fri. Take 1 tablet (5 mg) all other days or as instructed by INR Clinic.    Current use of long term  anticoagulation       * Notice:  This list has 4 medication(s) that are the same as other medications prescribed for you. Read the directions carefully, and ask your doctor or other care provider to review them with you.

## 2018-06-14 NOTE — NURSING NOTE
Pt had injection in his back last week.  Pt wet the bed after.  Pt having R side flank pain.  Pt denies gross hem.    MARAL Nevarez, CMA

## 2018-06-14 NOTE — TELEPHONE ENCOUNTER
"Requested Prescriptions   Pending Prescriptions Disp Refills     donepezil (ARICEPT) 10 MG tablet [Pharmacy Med Name: Donepezil HCl Oral Tablet 10 MG] 90 tablet 0     Sig: Take 1 tablet (10 mg) by mouth At Bedtime    Miscellaneous Dementia Agents Passed    6/9/2018 10:50 AM       Passed - Recent (12 mo) or future (30 days) visit within the authorizing provider's specialty    Patient had office visit in the last 12 months or has a visit in the next 30 days with authorizing provider or within the authorizing provider's specialty.  See \"Patient Info\" tab in inbasket, or \"Choose Columns\" in Meds & Orders section of the refill encounter.           Passed - Patient is 18 years of age or older        Last office visit 2/9/18.  Prescription approved per [facility] Refill Protocol.  Amanda Wheeler RN    "

## 2018-06-14 NOTE — LETTER
"6/14/2018     RE: Todd S Aschoff  [address] [facility] [address]     Dear Colleague,    Thank you for referring your patient, Todd S Aschoff, to the [facility] [place] at [facility]. Please see a copy of my visit note below.    Todd Aschoff is a pleasant 62-year-old male with a neurogenic bladder. He takes myrbetriq 50 mg daily but is having more bedwetting. He's been dealing with a herniated disc and spinal \"mass\" through [facility] in [place] - [address].  He is having no dysuria or hematuria. He has a normal urinalysis this morning and a 5 cc postvoid residual.  PSA is 3.84 which is stable. Biopsies last year were all benign but showed some chronic inflammation. Patient has a history of ASAP of the prostate several years ago  Other past medical history: Father with prostate cancer, history of alcohol dependence, aortic valve prosthesis, atrial fibrillation, chronic prostatitis, BPH, chronic pain, aortic dissection, headaches, heart murmur, history of spinal cord injury, major depression, hyperlipidemia, osteoarthritis, obesity, sciatica, hypothyroidism, nonsmoker  Medications: Long list reviewed  Allergies: None  Exam: Normal appearance, abdominal obesity. Alert and oriented, normocephalic, normal respirations, neuro grossly intact. Normal sphincter tone, no rectal mass or impaction, symmetric and benign feeling prostate, normal seminal vesicles  Assessment: Neurogenic bladder/overactive bladder-patient empties well but cannot store urine.  Plan: Continue myrbetriq. Not a candidate for InterStim if he has a correctable spine issue. See me yearly with PSA and for digital rectal exam    Again, thank you for allowing me to participate in the care of your patient.      Sincerely,    Geraldo Gonzales MD    "

## 2018-06-14 NOTE — LETTER
"6/14/2018      RE: Todd S Aschoff  [address] [facility] [address]       Todd Aschoff is a pleasant 62-year-old male with a neurogenic bladder. He takes myrbetriq 50 mg daily but is having more bedwetting. He's been dealing with a herniated disc and spinal \"mass\" through [facility] in [place] - Dariusz Zavala and Felix.  He is having no dysuria or hematuria. He has a normal urinalysis this morning and a 5 cc postvoid residual.  PSA is 3.84 which is stable. Biopsies last year were all benign but showed some chronic inflammation. Patient has a history of ASAP of the prostate several years ago  Other past medical history: Father with prostate cancer, history of alcohol dependence, aortic valve prosthesis, atrial fibrillation, chronic prostatitis, BPH, chronic pain, aortic dissection, headaches, heart murmur, history of spinal cord injury, major depression, hyperlipidemia, osteoarthritis, obesity, sciatica, hypothyroidism, nonsmoker  Medications: Long list reviewed  Allergies: None  Exam: Normal appearance, abdominal obesity. Alert and oriented, normocephalic, normal respirations, neuro grossly intact. Normal sphincter tone, no rectal mass or impaction, symmetric and benign feeling prostate, normal seminal vesicles  Assessment: Neurogenic bladder/overactive bladder-patient empties well but cannot store urine.  Plan: Continue myrbetriq. Not a candidate for InterStim if he has a correctable spine issue. See me yearly with PSA and for digital rectal exam      Geraldo Gonzales MD      "

## 2018-06-15 ENCOUNTER — ANTICOAGULATION THERAPY VISIT (OUTPATIENT)
Dept: NURSING | Facility: CLINIC | Age: 63
End: 2018-06-15
Payer: COMMERCIAL

## 2018-06-15 DIAGNOSIS — M54.50 CHRONIC LOW BACK PAIN: ICD-10-CM

## 2018-06-15 DIAGNOSIS — Z95.2 AORTIC VALVE PROSTHESIS PRESENT: ICD-10-CM

## 2018-06-15 DIAGNOSIS — I48.91 ATRIAL FIBRILLATION (H): ICD-10-CM

## 2018-06-15 DIAGNOSIS — Z79.01 LONG-TERM (CURRENT) USE OF ANTICOAGULANTS: ICD-10-CM

## 2018-06-15 DIAGNOSIS — G89.29 CHRONIC LOW BACK PAIN: ICD-10-CM

## 2018-06-15 LAB — INR POINT OF CARE: 4.9 (ref 0.86–1.14)

## 2018-06-15 PROCEDURE — 36416 COLLJ CAPILLARY BLOOD SPEC: CPT

## 2018-06-15 PROCEDURE — 99207 ZZC NO CHARGE NURSE ONLY: CPT

## 2018-06-15 PROCEDURE — 85610 PROTHROMBIN TIME: CPT | Mod: QW

## 2018-06-15 NOTE — MR AVS SNAPSHOT
Nicko SHINE Aschoff   6/15/2018 11:30 AM   Anticoagulation Therapy Visit    Description:  62 year old male   Provider:  [facility]   Department:  [facility]           INR as of 6/15/2018     Today's INR 4.9!      Anticoagulation Summary as of 6/15/2018     INR goal 2.5-3.5   Today's INR 4.9!   Full warfarin instructions 6/15: Hold; 6/18: 5 mg; Otherwise 7.5 mg on Mon, Fri; 5 mg all other days   Next INR check 6/22/2018    Indications   Long-term (current) use of anticoagulants [Z79.01] [Z79.01]  Aortic valve prosthesis present [Z95.2]  Atrial fibrillation (H) [I48.91]         Your next Anticoagulation Clinic appointment(s)     Jun 22, 2018  2:45 PM CDT   Anticoagulation Visit with [facility]   [facility]    [address] [facility] [address]  [facility] [address] [phone]              Contact Numbers     [facility]  Please call  [phone] to cancel and/or reschedule your appointment   Please call  [phone] with any problems or questions regarding your therapy.        June 2018 Details    Sun Mon Tue Wed Thu Fri Sat          1               2                 3               4               5               6               7               8               9                 10               11               12               13               14               15      Hold   See details      16      5 mg           17      5 mg         18      5 mg         19      5 mg         20      5 mg         21      5 mg         22            23                 24               25               26               27               28               29               30                Date Details   06/15 This INR check       Date of next INR:  6/22/2018         How to take your warfarin dose     To take:  5 mg Take 1 of the 5 mg tablets.    To take:  7.5 mg Take 1.5 of the 5 mg tablets.    Hold Do not take your warfarin dose. See the Details table to the  right for additional instructions.

## 2018-06-15 NOTE — PROGRESS NOTES
ANTICOAGULATION FOLLOW-UP CLINIC VISIT    Patient Name:  Todd S Aschoff  Date:  6/15/2018  Contact Type:  Face to Face    SUBJECTIVE:     Patient Findings     Positives Inflammation    Comments Back problems. Worse in past 2 weeks.  Steroid injection 4 weeks ago  Mass in back - may need surgery    Patient denies: extra doses,   bleeding/bruises, medication changes               OBJECTIVE    INR Protime   Date Value Ref Range Status   06/15/2018 4.9 (A) 0.86 - 1.14 Final       ASSESSMENT / PLAN  INR assessment SUPRA    Recheck INR In: 1 WEEK    INR Location Clinic      Anticoagulation Summary as of 6/15/2018     INR goal 2.5-3.5   Today's INR 4.9!   Warfarin maintenance plan 7.5 mg (5 mg x 1.5) on Mon, Fri; 5 mg (5 mg x 1) all other days   Full warfarin instructions 6/15: Hold; 6/18: 5 mg; Otherwise 7.5 mg on Mon, Fri; 5 mg all other days   Weekly warfarin total 40 mg   Plan last modified Yadi Wells RN (3/16/2018)   Next INR check 6/22/2018   Priority INR   Target end date     Indications   Long-term (current) use of anticoagulants [Z79.01] [Z79.01]  Aortic valve prosthesis present [Z95.2]  Atrial fibrillation (H) [I48.91]         Anticoagulation Episode Summary     INR check location     Preferred lab     Send INR reminders to [facility]    Comments       Anticoagulation Care Providers     Provider Role Specialty Phone number    Obdulio Ingram MD [facility] [phone]            See the Encounter Report to view Anticoagulation Flowsheet and Dosing Calendar (Go to Encounters tab in chart review, and find the Anticoagulation Therapy Visit)        Nova John RN

## 2018-06-19 DIAGNOSIS — E78.5 HYPERLIPIDEMIA LDL GOAL <130: ICD-10-CM

## 2018-06-19 RX ORDER — ACETAMINOPHEN 650 MG/1
TABLET, FILM COATED, EXTENDED RELEASE ORAL
Qty: 180 TABLET | Refills: 1 | Status: SHIPPED | OUTPATIENT
Start: 2018-06-19 | End: 2018-11-06

## 2018-06-22 ENCOUNTER — ANTICOAGULATION THERAPY VISIT (OUTPATIENT)
Dept: ANTICOAGULATION | Facility: CLINIC | Age: 63
End: 2018-06-22
Payer: COMMERCIAL

## 2018-06-22 DIAGNOSIS — I48.91 ATRIAL FIBRILLATION (H): ICD-10-CM

## 2018-06-22 DIAGNOSIS — Z95.2 AORTIC VALVE PROSTHESIS PRESENT: ICD-10-CM

## 2018-06-22 DIAGNOSIS — Z79.01 LONG-TERM (CURRENT) USE OF ANTICOAGULANTS: ICD-10-CM

## 2018-06-22 LAB — INR POINT OF CARE: 2.6 (ref 0.86–1.14)

## 2018-06-22 PROCEDURE — 85610 PROTHROMBIN TIME: CPT | Mod: QW

## 2018-06-22 PROCEDURE — 36416 COLLJ CAPILLARY BLOOD SPEC: CPT

## 2018-06-22 PROCEDURE — 99207 ZZC NO CHARGE NURSE ONLY: CPT

## 2018-06-22 RX ORDER — SIMVASTATIN 40 MG
TABLET ORAL
Qty: 90 TABLET | Refills: 0 | Status: SHIPPED | OUTPATIENT
Start: 2018-06-22 | End: 2018-08-02

## 2018-06-22 NOTE — MR AVS SNAPSHOT
Todd S Aschoff   6/22/2018 2:45 PM   Anticoagulation Therapy Visit    Description:  62 year old male   Provider:  [facility]   Department:  [facility]           INR as of 6/22/2018     Today's INR 2.6      Anticoagulation Summary as of 6/22/2018     INR goal 2.5-3.5   Today's INR 2.6   Full warfarin instructions 7.5 mg on Fri; 5 mg all other days   Next INR check 7/6/2018    Indications   Long-term (current) use of anticoagulants [Z79.01] [Z79.01]  Aortic valve prosthesis present [Z95.2]  Atrial fibrillation (H) [I48.91]         Your next Anticoagulation Clinic appointment(s)     Jul 06, 2018 11:45 AM CDT   Anticoagulation Visit with [facility]   [facility]    [address] [facility] [address]  [facility] [address] [phone]              Contact Numbers     [facility] Phone Numbers:  Anticoagulation Clinic Appointments : [phone]  Anticoagulation Nurse: [phone]         June 2018 Details    Sun Mon Tue Wed Thu Fri Sat          1               2                 3               4               5               6               7               8               9                 10               11               12               13               14               15               16                 17               18               19               20               21               22      7.5 mg   See details      23      5 mg           24      5 mg         25      5 mg         26      5 mg         27      5 mg         28      5 mg         29      7.5 mg         30      5 mg          Date Details   06/22 This INR check               How to take your warfarin dose     To take:  5 mg Take 1 of the 5 mg tablets.    To take:  7.5 mg Take 1.5 of the 5 mg tablets.           July 2018 Details    Sun Mon Tue Wed Thu Fri Sat     1      5 mg         2      5 mg         3      5 mg         4      5 mg         5      5 mg         6             7                 8               9               10               11               12               13               14                 15               16               17               18               19               20               21                 22               23               24               25               26               27               28                 29               30               31                    Date Details   No additional details    Date of next INR:  7/6/2018         How to take your warfarin dose     To take:  5 mg Take 1 of the 5 mg tablets.    To take:  7.5 mg Take 1.5 of the 5 mg tablets.

## 2018-06-22 NOTE — TELEPHONE ENCOUNTER
Patient calls to check on refill request.     Medication is being filled for 1 time refill only due to:  future appt scheduled

## 2018-06-22 NOTE — TELEPHONE ENCOUNTER
"Requested Prescriptions   Pending Prescriptions Disp Refills     simvastatin (ZOCOR) 40 MG tablet [Pharmacy Med Name: Simvastatin Oral Tablet 40 MG]  Last Written Prescription Date:  5/23/2018  Last Fill Quantity: 30 tablet,  # refills: 0   Last Office Visit: 2/9/2018   Future Office Visit:    Next 5 appointments (look out 90 days)     Aug 02, 2018  4:20 PM CDT   PHYSICAL with Obdulio Ingram MD   [facility]    [address]  [facility] [id number] [phone]                  30 tablet 0     Sig: Take 1 tablet (40 mg) by mouth At Bedtime    Statins Protocol Failed    6/19/2018  7:07 AM       Failed - LDL on file in past 12 months    Recent Labs   Lab Test  02/22/17   0911   LDL  99            Passed - No abnormal creatine kinase in past 12 months    No lab results found.            Passed - Recent (12 mo) or future (30 days) visit within the authorizing provider's specialty    Patient had office visit in the last 12 months or has a visit in the next 30 days with authorizing provider or within the authorizing provider's specialty.  See \"Patient Info\" tab in inbasket, or \"Choose Columns\" in Meds & Orders section of the refill encounter.           Passed - Patient is age 18 or older          "

## 2018-06-22 NOTE — PROGRESS NOTES
ANTICOAGULATION FOLLOW-UP CLINIC VISIT    Patient Name:  Todd S Aschoff  Date:  6/22/2018  Contact Type:  Face to Face    SUBJECTIVE:     Patient Findings     Positives Intentional hold of therapy (intentionally held due to elevated INR)    Comments Pt may be having injections for his back soon or surgery.           OBJECTIVE    INR Protime   Date Value Ref Range Status   06/22/2018 2.6 (A) 0.86 - 1.14 Final       ASSESSMENT / PLAN  INR assessment THER    Recheck INR In: 2 WEEKS    INR Location Clinic      Anticoagulation Summary as of 6/22/2018     INR goal 2.5-3.5   Today's INR 2.6   Warfarin maintenance plan 7.5 mg (5 mg x 1.5) on Fri; 5 mg (5 mg x 1) all other days   Full warfarin instructions 7.5 mg on Fri; 5 mg all other days   Weekly warfarin total 37.5 mg   Plan last modified Amanda Wheeler RN (6/22/2018)   Next INR check 7/6/2018   Priority INR   Target end date     Indications   Long-term (current) use of anticoagulants [Z79.01] [Z79.01]  Aortic valve prosthesis present [Z95.2]  Atrial fibrillation (H) [I48.91]         Anticoagulation Episode Summary     INR check location     Preferred lab     Send INR reminders to [facility]    Comments       Anticoagulation Care Providers     Provider Role Specialty Phone number    Obdulio Ingram MD [facility] Internal Medicine [phone]            See the Encounter Report to view Anticoagulation Flowsheet and Dosing Calendar (Go to Encounters tab in chart review, and find the Anticoagulation Therapy Visit)    Dosage adjustment made based on physician directed care plan.    Amanda Wheeler RN

## 2018-07-02 ENCOUNTER — TELEPHONE (OUTPATIENT)
Dept: INTERNAL MEDICINE | Facility: CLINIC | Age: 63
End: 2018-07-02

## 2018-07-02 DIAGNOSIS — Z72.0 TOBACCO ABUSE: Primary | ICD-10-CM

## 2018-07-02 NOTE — TELEPHONE ENCOUNTER
Patient calls. He uses smokeless tobacco and requesting prescription for Nicotine lozenges to stop using this. He uses about 1.5 tins of smokeless tobacco a week.

## 2018-07-04 RX ORDER — POLYETHYLENE GLYCOL 3350 17 G
2 POWDER IN PACKET (EA) ORAL
Qty: 360 LOZENGE | Refills: 1 | Status: SHIPPED | OUTPATIENT
Start: 2018-07-04 | End: 2020-05-29

## 2018-07-06 ENCOUNTER — ANTICOAGULATION THERAPY VISIT (OUTPATIENT)
Dept: ANTICOAGULATION | Facility: CLINIC | Age: 63
End: 2018-07-06
Payer: COMMERCIAL

## 2018-07-06 ENCOUNTER — TELEPHONE (OUTPATIENT)
Dept: INTERNAL MEDICINE | Facility: CLINIC | Age: 63
End: 2018-07-06

## 2018-07-06 DIAGNOSIS — Z95.2 AORTIC VALVE PROSTHESIS PRESENT: ICD-10-CM

## 2018-07-06 DIAGNOSIS — I48.91 ATRIAL FIBRILLATION (H): ICD-10-CM

## 2018-07-06 DIAGNOSIS — Z79.01 LONG-TERM (CURRENT) USE OF ANTICOAGULANTS: ICD-10-CM

## 2018-07-06 DIAGNOSIS — Z95.2 AORTIC VALVE PROSTHESIS PRESENT: Primary | ICD-10-CM

## 2018-07-06 LAB — INR POINT OF CARE: 2.3 (ref 0.86–1.14)

## 2018-07-06 PROCEDURE — 36416 COLLJ CAPILLARY BLOOD SPEC: CPT

## 2018-07-06 PROCEDURE — 85610 PROTHROMBIN TIME: CPT | Mod: QW

## 2018-07-06 PROCEDURE — 99207 ZZC NO CHARGE NURSE ONLY: CPT

## 2018-07-06 NOTE — MR AVS SNAPSHOT
Todd S Aschoff   7/6/2018 11:45 AM   Anticoagulation Therapy Visit    Description:  62 year old male   Provider:  [facility]   Department:  [facility]           INR as of 7/6/2018     Today's INR 2.3!      Anticoagulation Summary as of 7/6/2018     INR goal 2.5-3.5   Today's INR 2.3!   Full warfarin instructions 7/6: 10 mg; Otherwise 7.5 mg on Mon, Fri; 5 mg all other days   Next INR check 8/2/2018    Indications   Long-term (current) use of anticoagulants [Z79.01] [Z79.01]  Aortic valve prosthesis present [Z95.2]  Atrial fibrillation (H) [I48.91]         Your next Anticoagulation Clinic appointment(s)     Jul 06, 2018 11:45 AM CDT   [facility] Visit with [facility]   [facility] ([facility]    [address] [facility] [address]  [facility] [id number] [phone]            Aug 02, 2018  4:00 PM CDT   [facility]ticoagulation Visit with [facility]   [facility] ([facility]    [address] [facility] [address]  [facility] [id number] [phone]              Contact Numbers     [facility] Phone Numbers:  Anticoagulation Clinic Appointments : [phone]  Anticoagulation Nurse: [phone]         July 2018 Details    Sun Mon Tue Wed Thu Fri Sat     1               2               3               4               5               6      10 mg   See details      7      5 mg           8      5 mg         9      7.5 mg         10      5 mg         11      5 mg         12      5 mg         13      7.5 mg         14      5 mg           15      5 mg         16      7.5 mg         17      5 mg         18      5 mg         19      5 mg         20      7.5 mg         21      5 mg           22      5 mg         23      7.5 mg         24      5 mg         25      5 mg         26      5 mg         27      7.5 mg         28      5 mg           29      5 mg         30      7.5 mg         31      5 mg               Date Details   07/06 This INR check               How to take your warfarin dose     To take:  5 mg Take 1 of the 5 mg tablets.    To take:  7.5 mg Take 1.5 of the 5 mg tablets.    To take:  10 mg Take 2 of the 5 mg tablets.           August 2018 Details    Sun Mon Tue Wed Thu Fri Sat        1      5 mg         2            3               4                 5               6               7               8               9               10               11                 12               13               14               15               16               17               18                 19               20               21               22               23               24               25                 26               27               28               29               30               31                 Date Details   No additional details    Date of next INR:  8/2/2018         How to take your warfarin dose     To take:  5 mg Take 1 of the 5 mg tablets.

## 2018-07-06 NOTE — TELEPHONE ENCOUNTER
Form received from: [place]  [employer]    Form requesting following info/need: Expected recovery date form    AUTUMN needed?: No    Location of form: Dr. Ingram's yellow folder    When completed the route for return: Fax

## 2018-07-06 NOTE — TELEPHONE ENCOUNTER
For insurance purposes, an INR referral is needed.  Please sign order and route message back to [facility].  Amanda Wheeler RN

## 2018-07-06 NOTE — PROGRESS NOTES
ANTICOAGULATION FOLLOW-UP CLINIC VISIT    Patient Name:  Todd S Aschoff  Date:  7/6/2018  Contact Type:  Face to Face    SUBJECTIVE:     Patient Findings     Positives No Problem Findings (continues to have back pain.  Plans to follow up with ortho soon.)           OBJECTIVE    INR Protime   Date Value Ref Range Status   07/06/2018 2.3 (A) 0.86 - 1.14 Final       ASSESSMENT / PLAN  INR assessment SUB    Recheck INR In: 4 WEEKS INR appointment coordinated with MD visit.   INR Location Clinic      Anticoagulation Summary as of 7/6/2018     INR goal 2.5-3.5   Today's INR 2.3!   Warfarin maintenance plan 7.5 mg (5 mg x 1.5) on Mon, Fri; 5 mg (5 mg x 1) all other days   Full warfarin instructions 7/6: 10 mg; Otherwise 7.5 mg on Mon, Fri; 5 mg all other days   Weekly warfarin total 40 mg   Plan last modified Amanda Wheeler RN (7/6/2018)   Next INR check 8/2/2018   Priority INR   Target end date     Indications   Long-term (current) use of anticoagulants [Z79.01] [Z79.01]  Aortic valve prosthesis present [Z95.2]  Atrial fibrillation (H) [I48.91]         Anticoagulation Episode Summary     INR check location     Preferred lab     Send INR reminders to [facility]    Comments       Anticoagulation Care Providers     Provider Role Specialty Phone number    Obdulio Ingram MD [facility] Internal Medicine [phone]            See the Encounter Report to view Anticoagulation Flowsheet and Dosing Calendar (Go to Encounters tab in chart review, and find the Anticoagulation Therapy Visit)    Dosage adjustment made based on physician directed care plan.    Amanda Wheeler RN

## 2018-07-18 ENCOUNTER — TRANSFERRED RECORDS (OUTPATIENT)
Dept: HEALTH INFORMATION MANAGEMENT | Facility: CLINIC | Age: 63
End: 2018-07-18

## 2018-07-27 ENCOUNTER — TRANSFERRED RECORDS (OUTPATIENT)
Dept: HEALTH INFORMATION MANAGEMENT | Facility: CLINIC | Age: 63
End: 2018-07-27

## 2018-07-27 ENCOUNTER — TELEPHONE (OUTPATIENT)
Dept: INTERNAL MEDICINE | Facility: CLINIC | Age: 63
End: 2018-07-27

## 2018-07-27 LAB — PHQ9 SCORE: 0

## 2018-07-27 NOTE — TELEPHONE ENCOUNTER
Reason for Call:  Other Letter    Detailed comments: Pt is needing letter faxed over to [facility] (direct fax to  is [phone]) saying that the pt can be off of his coumadin (for 5 days) for an injection. Pt says that he is unable to make the appt until they receive the letter from Dr. Ingram    Phone Number Patient can be reached at: Home number on file [phone] (home)    Best Time: any    Can we leave a detailed message on this number? YES    Call taken on 7/27/2018 at 12:07 PM by Trinidad Santiago

## 2018-07-27 NOTE — LETTER
[facility]  [address] [place] [address]  [phone]    2018    Regarding:  Todd S Aschoff  [address] [facility] [address]  : 1955            To whom it may concern:     Todd S Aschoff may hold warfarin for five days prior to an orthopedic procedure.     If you have any further questions or problems, please contact our office.    Sincerely,        Obdulio Ingram MD

## 2018-08-02 ENCOUNTER — ANTICOAGULATION THERAPY VISIT (OUTPATIENT)
Dept: ANTICOAGULATION | Facility: CLINIC | Age: 63
End: 2018-08-02
Payer: COMMERCIAL

## 2018-08-02 ENCOUNTER — OFFICE VISIT (OUTPATIENT)
Dept: INTERNAL MEDICINE | Facility: CLINIC | Age: 63
End: 2018-08-02
Payer: COMMERCIAL

## 2018-08-02 VITALS
BODY MASS INDEX: 41.75 KG/M2 | DIASTOLIC BLOOD PRESSURE: 80 MMHG | OXYGEN SATURATION: 97 % | HEART RATE: 75 BPM | TEMPERATURE: 98.2 F | RESPIRATION RATE: 16 BRPM | WEIGHT: 315 LBS | SYSTOLIC BLOOD PRESSURE: 130 MMHG | HEIGHT: 73 IN

## 2018-08-02 DIAGNOSIS — E78.5 HYPERLIPIDEMIA LDL GOAL <130: ICD-10-CM

## 2018-08-02 DIAGNOSIS — Z00.00 ROUTINE GENERAL MEDICAL EXAMINATION AT A HEALTH CARE FACILITY: Primary | ICD-10-CM

## 2018-08-02 DIAGNOSIS — R41.3 MEMORY DIFFICULTIES: ICD-10-CM

## 2018-08-02 DIAGNOSIS — E66.01 MORBID OBESITY, UNSPECIFIED OBESITY TYPE (H): ICD-10-CM

## 2018-08-02 DIAGNOSIS — M54.40 CHRONIC LOW BACK PAIN WITH SCIATICA, SCIATICA LATERALITY UNSPECIFIED, UNSPECIFIED BACK PAIN LATERALITY: ICD-10-CM

## 2018-08-02 DIAGNOSIS — Z79.01 LONG-TERM (CURRENT) USE OF ANTICOAGULANTS: ICD-10-CM

## 2018-08-02 DIAGNOSIS — F33.42 MAJOR DEPRESSIVE DISORDER, RECURRENT EPISODE, IN FULL REMISSION (H): ICD-10-CM

## 2018-08-02 DIAGNOSIS — F10.21 ALCOHOL DEPENDENCE IN REMISSION (H): ICD-10-CM

## 2018-08-02 DIAGNOSIS — J31.0 OTHER CHRONIC RHINITIS: ICD-10-CM

## 2018-08-02 DIAGNOSIS — E03.9 ACQUIRED HYPOTHYROIDISM: ICD-10-CM

## 2018-08-02 DIAGNOSIS — F51.01 PRIMARY INSOMNIA: ICD-10-CM

## 2018-08-02 DIAGNOSIS — I48.91 ATRIAL FIBRILLATION (H): ICD-10-CM

## 2018-08-02 DIAGNOSIS — N32.81 OVERACTIVE BLADDER: ICD-10-CM

## 2018-08-02 DIAGNOSIS — K59.00 CONSTIPATION, UNSPECIFIED CONSTIPATION TYPE: ICD-10-CM

## 2018-08-02 DIAGNOSIS — G89.29 CHRONIC LOW BACK PAIN WITH SCIATICA, SCIATICA LATERALITY UNSPECIFIED, UNSPECIFIED BACK PAIN LATERALITY: ICD-10-CM

## 2018-08-02 DIAGNOSIS — I48.20 CHRONIC ATRIAL FIBRILLATION (H): ICD-10-CM

## 2018-08-02 DIAGNOSIS — Z95.2 AORTIC VALVE PROSTHESIS PRESENT: ICD-10-CM

## 2018-08-02 DIAGNOSIS — Z95.2 H/O MECHANICAL AORTIC VALVE REPLACEMENT: ICD-10-CM

## 2018-08-02 DIAGNOSIS — M62.830 BACK MUSCLE SPASM: ICD-10-CM

## 2018-08-02 DIAGNOSIS — M62.838 MUSCLE SPASM: ICD-10-CM

## 2018-08-02 LAB
ERYTHROCYTE [DISTWIDTH] IN BLOOD BY AUTOMATED COUNT: 12.7 % (ref 10–15)
HCT VFR BLD AUTO: 47 % (ref 40–53)
HGB BLD-MCNC: 16.1 G/DL (ref 13.3–17.7)
INR POINT OF CARE: 3.2 (ref 0.86–1.14)
MCH RBC QN AUTO: 32.1 PG (ref 26.5–33)
MCHC RBC AUTO-ENTMCNC: 34.3 G/DL (ref 31.5–36.5)
MCV RBC AUTO: 94 FL (ref 78–100)
PLATELET # BLD AUTO: 205 10E9/L (ref 150–450)
RBC # BLD AUTO: 5.01 10E12/L (ref 4.4–5.9)
WBC # BLD AUTO: 7 10E9/L (ref 4–11)

## 2018-08-02 PROCEDURE — 84443 ASSAY THYROID STIM HORMONE: CPT | Performed by: INTERNAL MEDICINE

## 2018-08-02 PROCEDURE — 80061 LIPID PANEL: CPT | Performed by: INTERNAL MEDICINE

## 2018-08-02 PROCEDURE — 99207 ZZC NO CHARGE NURSE ONLY: CPT

## 2018-08-02 PROCEDURE — 80053 COMPREHEN METABOLIC PANEL: CPT | Performed by: INTERNAL MEDICINE

## 2018-08-02 PROCEDURE — 85027 COMPLETE CBC AUTOMATED: CPT | Performed by: INTERNAL MEDICINE

## 2018-08-02 PROCEDURE — 36416 COLLJ CAPILLARY BLOOD SPEC: CPT

## 2018-08-02 PROCEDURE — 99396 PREV VISIT EST AGE 40-64: CPT | Performed by: INTERNAL MEDICINE

## 2018-08-02 PROCEDURE — 85610 PROTHROMBIN TIME: CPT | Mod: QW

## 2018-08-02 RX ORDER — PROPAFENONE HYDROCHLORIDE 150 MG/1
150 TABLET, COATED ORAL EVERY 8 HOURS
Qty: 270 TABLET | Refills: 3 | Status: SHIPPED | OUTPATIENT
Start: 2018-08-02 | End: 2019-08-13

## 2018-08-02 RX ORDER — VENLAFAXINE HYDROCHLORIDE 150 MG/1
150 TABLET, EXTENDED RELEASE ORAL
Qty: 90 EACH | Refills: 3 | Status: SHIPPED | OUTPATIENT
Start: 2018-08-02 | End: 2019-08-02

## 2018-08-02 RX ORDER — GUANFACINE 1 MG/1
1 TABLET ORAL AT BEDTIME
Qty: 90 TABLET | Refills: 3 | Status: SHIPPED | OUTPATIENT
Start: 2018-08-02 | End: 2019-08-13

## 2018-08-02 RX ORDER — LEVOTHYROXINE SODIUM 150 UG/1
150 TABLET ORAL DAILY
Qty: 90 TABLET | Refills: 3 | Status: SHIPPED | OUTPATIENT
Start: 2018-08-02 | End: 2019-10-29

## 2018-08-02 RX ORDER — CYCLOBENZAPRINE HCL 10 MG
10 TABLET ORAL 3 TIMES DAILY PRN
Qty: 270 TABLET | Refills: 3 | Status: SHIPPED | OUTPATIENT
Start: 2018-08-02 | End: 2019-09-10

## 2018-08-02 RX ORDER — TRAZODONE HYDROCHLORIDE 50 MG/1
50 TABLET, FILM COATED ORAL AT BEDTIME
Qty: 90 TABLET | Refills: 3 | Status: SHIPPED | OUTPATIENT
Start: 2018-08-02 | End: 2019-08-02

## 2018-08-02 RX ORDER — AZELASTINE 1 MG/ML
SPRAY, METERED NASAL
Qty: 30 ML | Refills: 11 | Status: SHIPPED | OUTPATIENT
Start: 2018-08-02 | End: 2020-05-29

## 2018-08-02 RX ORDER — MIRABEGRON 50 MG/1
50 TABLET, EXTENDED RELEASE ORAL DAILY
Qty: 90 TABLET | Refills: 3 | Status: SHIPPED | OUTPATIENT
Start: 2018-08-02 | End: 2019-09-07

## 2018-08-02 RX ORDER — DONEPEZIL HYDROCHLORIDE 10 MG/1
TABLET, FILM COATED ORAL
Qty: 90 TABLET | Refills: 3 | Status: SHIPPED | OUTPATIENT
Start: 2018-08-02 | End: 2019-08-28

## 2018-08-02 RX ORDER — SIMVASTATIN 40 MG
TABLET ORAL
Qty: 90 TABLET | Refills: 3 | Status: SHIPPED | OUTPATIENT
Start: 2018-08-02 | End: 2019-08-13

## 2018-08-02 RX ORDER — DIAZEPAM 5 MG
TABLET ORAL
Qty: 270 TABLET | Refills: 1 | Status: SHIPPED | OUTPATIENT
Start: 2018-08-02 | End: 2019-01-08

## 2018-08-02 RX ORDER — PREGABALIN 100 MG/1
100 CAPSULE ORAL 3 TIMES DAILY
Qty: 270 CAPSULE | Refills: 3 | Status: SHIPPED | OUTPATIENT
Start: 2018-08-02 | End: 2019-08-07

## 2018-08-02 RX ORDER — AMOXICILLIN 250 MG
1 CAPSULE ORAL 2 TIMES DAILY PRN
Qty: 60 TABLET | Refills: 11 | Status: SHIPPED | OUTPATIENT
Start: 2018-08-02 | End: 2021-06-18

## 2018-08-02 ASSESSMENT — ANXIETY QUESTIONNAIRES
3. WORRYING TOO MUCH ABOUT DIFFERENT THINGS: NOT AT ALL
IF YOU CHECKED OFF ANY PROBLEMS ON THIS QUESTIONNAIRE, HOW DIFFICULT HAVE THESE PROBLEMS MADE IT FOR YOU TO DO YOUR WORK, TAKE CARE OF THINGS AT HOME, OR GET ALONG WITH OTHER PEOPLE: NOT DIFFICULT AT ALL
6. BECOMING EASILY ANNOYED OR IRRITABLE: MORE THAN HALF THE DAYS
GAD7 TOTAL SCORE: 3
1. FEELING NERVOUS, ANXIOUS, OR ON EDGE: NOT AT ALL
2. NOT BEING ABLE TO STOP OR CONTROL WORRYING: SEVERAL DAYS
7. FEELING AFRAID AS IF SOMETHING AWFUL MIGHT HAPPEN: NOT AT ALL
5. BEING SO RESTLESS THAT IT IS HARD TO SIT STILL: NOT AT ALL

## 2018-08-02 ASSESSMENT — PATIENT HEALTH QUESTIONNAIRE - PHQ9: 5. POOR APPETITE OR OVEREATING: NOT AT ALL

## 2018-08-02 NOTE — MR AVS SNAPSHOT
Todd S Aschoff   8/2/2018 4:00 PM   Anticoagulation Therapy Visit    Description:  62 year old male   Provider[facility]   Department:  [facility]           INR as of 8/2/2018     Today's INR 3.2      Anticoagulation Summary as of 8/2/2018     INR goal 2.5-3.5   Today's INR 3.2   Full warfarin instructions 7.5 mg on Mon, Fri; 5 mg all other days   Next INR check 8/30/2018    Indications   Long-term (current) use of anticoagulants [Z79.01] [Z79.01]  Aortic valve prosthesis present [Z95.2]  Atrial fibrillation (H) [I48.91]         Contact Numbers     [facility] Phone Numbers:  Anticoagulation Clinic Appointments : [phone]  Anticoagulation Nurse: [phone]         August 2018 Details    Sun Mon Tue Wed Thu Fri Sat        1               2      5 mg   See details      3      7.5 mg         4      5 mg           5      5 mg         6      7.5 mg         7      5 mg         8      5 mg         9      5 mg         10      7.5 mg         11      5 mg           12      5 mg         13      7.5 mg         14      5 mg         15      5 mg         16      5 mg         17      7.5 mg         18      5 mg           19      5 mg         20      7.5 mg         21      5 mg         22      5 mg         23      5 mg         24      7.5 mg         25      5 mg           26      5 mg         27      7.5 mg         28      5 mg         29      5 mg         30            31                 Date Details   08/02 This INR check       Date of next INR:  8/30/2018         How to take your warfarin dose     To take:  5 mg Take 1 of the 5 mg tablets.    To take:  7.5 mg Take 1.5 of the 5 mg tablets.

## 2018-08-02 NOTE — MR AVS SNAPSHOT
After Visit Summary   8/2/2018    Todd S Aschoff    MRN: [id number]           Patient Information     Date Of Birth          1955        Visit Information        Provider Department      8/2/2018 4:20 PM Obdulio Ingram MD [facility]        Today's Diagnoses     Routine general medical examination at a health care facility    -  1    Other chronic rhinitis        Muscle spasm        Back muscle spasm        Memory difficulties        Major depressive disorder, recurrent episode, in full remission (H)        Acquired hypothyroidism        Overactive bladder        Chronic low back pain with sciatica, sciatica laterality unspecified, unspecified back pain laterality        Chronic atrial fibrillation (H)        Constipation, unspecified constipation type        Hyperlipidemia LDL goal <130        Primary insomnia        Morbid obesity, unspecified obesity type (H)        Alcohol dependence in remission (H)        H/O mechanical aortic valve replacement          Care Instructions      Preventive Health Recommendations  Male Ages 50 - 64    Yearly exam:             See your health care provider every year in order to  o   Review health changes.   o   Discuss preventive care.    o   Review your medicines if your doctor has prescribed any.     Have a cholesterol test every 5 years, or more frequently if you are at risk for high cholesterol/heart disease.     Have a diabetes test (fasting glucose) every three years. If you are at risk for diabetes, you should have this test more often.     Have a colonoscopy at age 50, or have a yearly FIT test (stool test). These exams will check for colon cancer.      Talk with your health care provider about whether or not a prostate cancer screening test (PSA) is right for you.    You should be tested each year for STDs (sexually transmitted diseases), if you re at risk.     Shots: Get a flu shot each year. Get a tetanus shot every 10 years.      Nutrition:    Eat at least 5 servings of fruits and vegetables daily.     Eat whole-grain bread, whole-wheat pasta and brown rice instead of white grains and rice.     Get adequate Calcium and Vitamin D.     Lifestyle    Exercise for at least 150 minutes a week (30 minutes a day, 5 days a week). This will help you control your weight and prevent disease.     Limit alcohol to one drink per day.     No smoking.     Wear sunscreen to prevent skin cancer.     See your dentist every six months for an exam and cleaning.     See your eye doctor every 1 to 2 years.      Someone will contact you to help schedule a cardiac echo.  Everything looks fine!    Refills of medications have been faxed to your pharmacy.     I'll get back to you with lab results soon, especially if there is anything of concern.      See you in a year, sooner if problems.              Follow-ups after your visit        Additional Services     PAIN MANAGEMENT REFERRAL       [place] [facility] [place] [facility] [place]    Reason for Referral: Evaluation for comprehensive services- patients will be evaluated if appropriate for comprehensive service including medication changes, procedures, pain psychology, and pain physical therapy.  While involved with comprehensive services, pain providers will work with referring provider/PCP to stabilize appropriate medication management, with long-term plan of transition of prescribing back to referring provider/PCP upon completion of comprehensive services.      Please complete the following questions:    Do you have any specific questions for the pain specialist? No    Are there any red flags that may impact the assessment or management of the patient? Mental Illness / Communication Difficulties (explain): Depression in remission      What is your diagnosis for the patient's pain? Chronic low back pain, disabled.       For any questions, contact the [facility] at  [phone].   [facility]: [facility] [place] [phone] [email or web address]      Please call [phone] to make an appointment. Clinic is located: [facility] [address] [place] [address] [id number]th [place] [address]      Please complete the following questions:    Procedure/Referral: Referral Only -  Comprehensive Evaluation and Management    What is your diagnosis for the patient's pain? Chronic low back pain    What are your specific questions for the pain specialist? Assist in overall management    Are there any red flags that may impact the assessment or management of the patient? Mental Illness / Communication Difficulties (explain): Depression in remission         Please note the Pre-Op Pain Consult must be scheduled 2-3 weeks prior to the patient's surgery.  Patient's trying to schedule within 2 weeks of surgery may not be accommodated.     Pre-Op Pain Consults are only good for 30 days.    **ANY DIAGNOSTIC TESTS THAT ARE NOT IN EPIC SHOULD BE SENT TO THE PAIN CENTER**    REGARDING OPIOID MEDICATIONS:  The discussion of opioids management, appropriateness of therapy, and dosing will be discussed in patients being seen for evaluation.  The pain management clinics are not long-term prescribing clinics, with transition of prescribing of medications ultimately going back to the referring provider/PCP.  If prescribing is taken over at the pain clinic, it is in actively involved patients whom are appropriate for opioids, urine drug screening is completed, and long-term prescribing plan has been determined.  Therefore, we will not be automatically taking over prescribing at the patient's first visit.  Is this agreeable to you? agrees.     Please be aware that coverage of these services is subject to the terms and limitations of your health insurance plan.  Call member services at [facility]  plan with any benefit or coverage questions.      Please bring the following with you to your appointment:    (1) Any X-Rays, CTs or MRIs which have been performed.  Contact the facility where they were done to arrange for  prior to your scheduled appointment.    (2) List of current medications   (3) This referral request   (4) Any documents/labs given to you for this referral                  Future tests that were ordered for you today     Open Future Orders        Priority Expected Expires Ordered    Echocardiogram Complete Routine  8/2/2019 8/2/2018            Who to contact     If you have questions or need follow up information about today's clinic visit or your schedule please contact [facility] directly at [phone].  Normal or non-critical lab and imaging results will be communicated to you by MyChart, letter or phone within 4 business days after the clinic has received the results. If you do not hear from us within 7 days, please contact the clinic through [employer] or phone. If you have a critical or abnormal lab result, we will notify you by phone as soon as possible.  Submit refill requests through [employer] or call your pharmacy and they will forward the refill request to us. Please allow 3 business days for your refill to be completed.          Additional Information About Your Visit        Figure [employer] Information     [employer] gives you secure access to your electronic health record. If you see a primary care provider, you can also send messages to your care team and make appointments. If you have questions, please call your primary care clinic.  If you do not have a primary care provider, please call [phone] and they will assist you.        Care EveryWhere ID     This is your Care EveryWhere ID. This could be used by other organizations to access your [place] medical records  [id number]        Your Vitals Were     Pulse Temperature Respirations Height Pulse Oximetry BMI  "(Body Mass Index)    75 98.2  F (36.8  C) (Oral) 16 6' 1\" (1.854 m) 97% 41.69 kg/m2       Blood Pressure from Last 3 Encounters:   08/02/18 130/80   02/22/18 132/81   02/09/18 130/72    Weight from Last 3 Encounters:   08/02/18 316 lb (143.3 kg)   06/14/18 300 lb (136.1 kg)   02/09/18 300 lb (136.1 kg)              We Performed the Following     CBC with platelets     Comprehensive metabolic panel     Lipid panel reflex to direct LDL Fasting     PAIN MANAGEMENT REFERRAL     TSH with free T4 reflex          Today's Medication Changes          These changes are accurate as of 8/2/18  5:20 PM.  If you have any questions, ask your nurse or doctor.               These medicines have changed or have updated prescriptions.        Dose/Directions    enoxaparin 150 MG/ML injection   Commonly known as:  LOVENOX   This may have changed:  Another medication with the same name was removed. Continue taking this medication, and follow the directions you see here.   Used for:  Aortic valve prosthesis present, Long term current use of anticoagulant therapy   Changed by:  Obdulio Ingram MD        Dose:  120 mg   Inject 0.8 mLs (120 mg) Subcutaneous every 12 hours   Quantity:  10 Syringe   Refills:  1       levothyroxine 150 MCG tablet   Commonly known as:  SYNTHROID/LEVOTHROID   This may have changed:    - how much to take  - how to take this  - when to take this  - additional instructions   Used for:  Acquired hypothyroidism   Changed by:  Obdulio Ingram MD        Dose:  150 mcg   Take 1 tablet (150 mcg) by mouth daily   Quantity:  90 tablet   Refills:  3            Where to get your medicines      These medications were sent to [id number] - [place], [place]  [address] [facility]  [address] [facility] [address]     Phone:  [phone]     azelastine 0.1 % spray    cyclobenzaprine 10 MG tablet    donepezil 10 MG tablet    guanFACINE 1 MG tablet    levothyroxine 150 MCG tablet    mirabegron 50 MG 24 hr tablet    " propafenone 150 MG Tabs tablet    senna-docusate 8.6-50 MG per tablet    simvastatin 40 MG tablet    traZODone 50 MG tablet    venlafaxine 150 MG Tb24 24 hr tablet         Some of these will need a paper prescription and others can be bought over the counter.  Ask your nurse if you have questions.     Bring a paper prescription for each of these medications     diazepam 5 MG tablet    pregabalin 100 MG capsule                Primary Care Provider Office Phone # Fax #    Obdulio Ingram [phone]       [address] [address]        Equal Access to Services     [facility]: Hadii aad ku hadasho Soomaali, waaxda luqadaha, qaybta kaalmada adeegyada, sharonda molina . So [facility] [phone].    ATENCIÓN: Si habla español, tiene a thomas disposición servicios gratuitos de asistencia lingüística. [facility] [phone].    We comply with applicable federal civil rights laws and [place] laws. We do not discriminate on the basis of race, color, national origin, age, disability, sex, sexual orientation, or gender identity.            Thank you!     Thank you for choosing [facility]  for your care. Our goal is always to provide you with excellent care. Hearing back from our patients is one way we can continue to improve our services. Please take a few minutes to complete the written survey that you may receive in the mail after your visit with us. Thank you!             Your Updated Medication List - Protect others around you: Learn how to safely use, store and throw away your medicines at www.disposemymeds.org.          This list is accurate as of 8/2/18  5:20 PM.  Always use your most recent med list.                   Brand Name Dispense Instructions for use Diagnosis    * acetaminophen 650 MG 8 hour tablet    ACETAMINOPHEN 8 HOUR    250 tablet    Take 650 mg by mouth 2 times daily    Chronic low back pain       * MAPAP ARTHRITIS PAIN 650 MG CR  tablet   Generic drug:  acetaminophen     180 tablet    Take 1 tablet (650mg) by mouth 2 times daily.    Chronic low back pain       azelastine 0.1 % spray    ASTELIN    30 mL    USE 2 SPRAYS IN NOSTRIL 2 TIMES DAILY AS NEEDED.    Other chronic rhinitis       cetirizine 10 MG tablet    zyrTEC    90 tablet    TAKE ONE TABLET BY MOUTH ONCE DAILY AS NEEDED    Chronic rhinitis       cyclobenzaprine 10 MG tablet    FLEXERIL    270 tablet    Take 1 tablet (10 mg) by mouth 3 times daily as needed for muscle spasms    Muscle spasm       diazepam 5 MG tablet    VALIUM    270 tablet    TAKE ONE TABLET BY MOUTH EVERY EIGHT HOURS AS NEEDED FOR ANXIETY OR MUSCLE SPASMS    Back muscle spasm       donepezil 10 MG tablet    ARICEPT    90 tablet    Take 1 tablet (10 mg) by mouth At Bedtime    Memory difficulties       enoxaparin 150 MG/ML injection    LOVENOX    10 Syringe    Inject 0.8 mLs (120 mg) Subcutaneous every 12 hours    Aortic valve prosthesis present, Long term current use of anticoagulant therapy       guanFACINE 1 MG tablet    TENEX    90 tablet    Take 1 tablet (1 mg) by mouth At Bedtime    Major depressive disorder, recurrent episode, in full remission (H)       isometheptene-acetaminophen-dichloralphenazone -100 MG per capsule    MIDRIN    30 capsule    Take 1 capsule by mouth 4 times daily as needed    Headache(784.0)       levothyroxine 150 MCG tablet    SYNTHROID/LEVOTHROID    90 tablet    Take 1 tablet (150 mcg) by mouth daily    Acquired hypothyroidism       mirabegron 50 MG 24 hr tablet    MYRBETRIQ    90 tablet    Take 1 tablet (50 mg) by mouth daily    Overactive bladder       NEURONTIN PO      Take 100 mg by mouth 2 times daily        nicotine polacrilex 2 MG lozenge    COMMIT    360 lozenge    Place 1 lozenge (2 mg) inside cheek every hour as needed for smoking cessation    Tobacco abuse       pregabalin 100 MG capsule    LYRICA    270 capsule    Take 1 capsule (100 mg) by mouth 3 times daily Do not  take if sleepy/sedated.    Chronic low back pain with sciatica, sciatica laterality unspecified, unspecified back pain laterality       propafenone 150 MG Tabs tablet    RYTHMOL    270 tablet    Take 1 tablet (150 mg) by mouth every 8 hours    Chronic atrial fibrillation (H)       senna-docusate 8.6-50 MG per tablet    SENOKOT-S;PERICOLACE    60 tablet    Take 1 tablet by mouth 2 times daily as needed for constipation    Constipation, unspecified constipation type       simvastatin 40 MG tablet    ZOCOR    90 tablet    Take 1 tablet (40 mg) by mouth At Bedtime    Hyperlipidemia LDL goal <130       traZODone 50 MG tablet    DESYREL    90 tablet    Take 1 tablet (50 mg) by mouth At Bedtime    Primary insomnia       venlafaxine 150 MG Tb24 24 hr tablet    EFFEXOR-ER    90 each    Take 1 tablet (150 mg) by mouth daily (with breakfast)    Major depressive disorder, recurrent episode, in full remission (H)       warfarin 5 MG tablet    COUMADIN    100 tablet    Take 1 1/2 tablets (7.5 mg) by mouth Mon and Fri. Take 1 tablet (5 mg) all other days or as instructed by INR Clinic.    Current use of long term anticoagulation       * Notice:  This list has 2 medication(s) that are the same as other medications prescribed for you. Read the directions carefully, and ask your doctor or other care provider to review them with you.

## 2018-08-02 NOTE — PROGRESS NOTES
ANTICOAGULATION FOLLOW-UP CLINIC VISIT    Patient Name:  Todd S Aschoff  Date:  8/2/2018  Contact Type:  Face to Face    SUBJECTIVE:     Patient Findings     Positives No Problem Findings    Comments Patient may be having an injection in his back in the near future.  He is working on getting this scheduled and will call the INR clinic for further instructions.  He has used lovenox bridging in the past.           OBJECTIVE    INR Protime   Date Value Ref Range Status   08/02/2018 3.2 (A) 0.86 - 1.14 Final       ASSESSMENT / PLAN  INR assessment THER    Recheck INR In: 4 WEEKS    INR Location Clinic      Anticoagulation Summary as of 8/2/2018     INR goal 2.5-3.5   Today's INR 3.2   Warfarin maintenance plan 7.5 mg (5 mg x 1.5) on Mon, Fri; 5 mg (5 mg x 1) all other days   Full warfarin instructions 7.5 mg on Mon, Fri; 5 mg all other days   Weekly warfarin total 40 mg   No change documented Amanda Wheeler RN   Plan last modified Amanda Wheeler RN (7/6/2018)   Next INR check 8/30/2018   Priority INR   Target end date     Indications   Long-term (current) use of anticoagulants [Z79.01] [Z79.01]  Aortic valve prosthesis present [Z95.2]  Atrial fibrillation (H) [I48.91]         Anticoagulation Episode Summary     INR check location     Preferred lab     Send INR reminders to [facility]    Comments       Anticoagulation Care Providers     Provider Role Specialty Phone number    Obdulio Ingram MD [facility] Internal Medicine [phone]            See the Encounter Report to view Anticoagulation Flowsheet and Dosing Calendar (Go to Encounters tab in chart review, and find the Anticoagulation Therapy Visit)    Dosage adjustment made based on physician directed care plan.    Amanda Wheeler RN

## 2018-08-02 NOTE — PATIENT INSTRUCTIONS
Preventive Health Recommendations  Male Ages 50   64    Yearly exam:             See your health care provider every year in order to  o   Review health changes.   o   Discuss preventive care.    o   Review your medicines if your doctor has prescribed any.     Have a cholesterol test every 5 years, or more frequently if you are at risk for high cholesterol/heart disease.     Have a diabetes test (fasting glucose) every three years. If you are at risk for diabetes, you should have this test more often.     Have a colonoscopy at age 50, or have a yearly FIT test (stool test). These exams will check for colon cancer.      Talk with your health care provider about whether or not a prostate cancer screening test (PSA) is right for you.    You should be tested each year for STDs (sexually transmitted diseases), if you re at risk.     Shots: Get a flu shot each year. Get a tetanus shot every 10 years.     Nutrition:    Eat at least 5 servings of fruits and vegetables daily.     Eat whole-grain bread, whole-wheat pasta and brown rice instead of white grains and rice.     Get adequate Calcium and Vitamin D.     Lifestyle    Exercise for at least 150 minutes a week (30 minutes a day, 5 days a week). This will help you control your weight and prevent disease.     Limit alcohol to one drink per day.     No smoking.     Wear sunscreen to prevent skin cancer.     See your dentist every six months for an exam and cleaning.     See your eye doctor every 1 to 2 years.      Someone will contact you to help schedule a cardiac echo.  Everything looks fine!    Refills of medications have been faxed to your pharmacy.     I'll get back to you with lab results soon, especially if there is anything of concern.      See you in a year, sooner if problems.

## 2018-08-02 NOTE — PROGRESS NOTES
SUBJECTIVE:   CC: Todd S Aschoff is an 62 year old male who presents for preventative health visit.     Healthy Habits:      Answers for HPI/ROS submitted by the patient on 8/2/2018   Annual Exam:  Getting at least 3 servings of Calcium per day:: NO  Bi-annual eye exam:: Yes  Dental care twice a year:: Yes  Sleep apnea or symptoms of sleep apnea:: None, Excessive snoring  Diet:: Regular (no restrictions), Vegetarian/vegan  Frequency of exercise:: None  Taking medications regularly:: Yes  Medication side effects:: None  Additional concerns today:: No  PHQ-2 Score: 1        Medication management  Patent recently started following with a psychiatrist with [facility] in [place]. Aricept has been prescribed by psychiatry, not by Neurology. Patient follows with psychiatrist every 6 months. He is not enthused about the psychiatrist he is seeing now. He might switch to the [place] clinic.     Chronic back pain  Patient reports chronic back pain, including back, butt and hamstring pain. He also notes nerve damage in his right leg. Patient takes Valium and cyclobenzaprine for back muscle spasms. He might have another steroid injection in the back. This will be the second injection this month. Patient relates surgery is not an option for him due several reasons including, possible complications of repeat surgery, unclear black mass in back. He would like to get started with a pain clinic to help him manage pain. Dr. Casas performed the first back surgery. He now follows with Dr. Umesh Penaloza at [facility]. Patient reports back pain impedes his activity. He is not able to walk to his mailbox and back with being impacted by pain.    Past/recent records reviewed and discussed for:  - Follows with urologist, Dr. Gonzales, for elevated PSA  - Colonoscopy 01/15/2015, normal. Q 5 years.     Today's PHQ-2 Score:   PHQ-2 ( 1999 [employer]) 8/2/2018 9/28/2017   Q1: Little interest or pleasure in doing things 1 0   Q2: Feeling down,  depressed or hopeless 0 0   PHQ-2 Score 1 0   Q1: Little interest or pleasure in doing things Several days -   Q2: Feeling down, depressed or hopeless Not at all -   PHQ-2 Score 1 -       Abuse: Current or Past(Physical, Sexual or Emotional)- No  Do you feel safe in your environment - Yes    Social History   Substance Use Topics     Smoking status: Never Smoker     Smokeless tobacco: Never Used     Alcohol use No      Comment: Stopped drinking alcohol ~2009      If you drink alcohol do you typically have >3 drinks per day or >7 drinks per week? No                      Last PSA:   PSA   Date Value Ref Range Status   06/12/2018 3.84 0 - 4 ug/L Final     Comment:     Assay Method:  Chemiluminescence using Siemens Vista analyzer       Reviewed orders with patient. Reviewed health maintenance and updated orders accordingly - Yes    Reviewed and updated as needed this visit by clinical staff  Tobacco  Allergies  Meds  Med Hx  Surg Hx  Fam Hx  Soc Hx        Reviewed and updated as needed this visit by Provider        Past Surgical History:   Procedure Laterality Date     AORTIC VALVE REPLACEMENT  1992    [place] F's valve     C NONSPECIFIC PROCEDURE  1992    repair of TAA with graft     C NONSPECIFIC PROCEDURE  2002    R hip replacement comp's by nerve injury with pain down into R leg, nadya below knee     C TOTAL HIP ARTHROPLASTY  2010    Left AMANDA     CATARACT IOL, RT/LT      rt eye only     CHOLECYSTECTOMY, LAPOROSCOPIC  8/10     COLONOSCOPY  1/15/2015    Dr. Kothari [facility]     COLONOSCOPY N/A 1/15/2015    Procedure: COLONOSCOPY;  Surgeon: Johnson Kothari MD;  Location: [facility]     DECOMPRESSION LUMBAR ONE LEVEL  4/3/2013    Procedure: DECOMPRESSION LUMBAR ONE LEVEL;  Open Decompression L3-4 bilateral;  Surgeon: Khalif Casas MD;  Location: [facility] OR     DECOMPRESSION, FUSION CERVICAL ANTERIOR ONE LEVEL, COMBINED  3/23/2012    Procedure:COMBINED DECOMPRESSION, FUSION CERVICAL ANTERIOR ONE LEVEL; Anterior Cervical  Decompression and Fusion C4-6; Surgeon:KHALIF COFFEY; Location:[facility] OR     EXPLORE SPINE, REMOVE HARDWARE, COMBINED  5/23/2013    Procedure: COMBINED EXPLORE SPINE, REMOVE HARDWARE;  Exploration Lumbar Wound for fluid collection;  Surgeon: Khalif Coffey MD;  Location: [facility] OR     FUSION CERVICAL ANTERIOR TWO LEVELS  3/26/2012    Procedure:FUSION CERVICAL ANTERIOR TWO LEVELS; Anterior Cervical Fusion C4-6, Anterior Cervical  Hematoma Evacuation; Surgeon:KHALIF COFFEY; Location:[facility] OR     IRRIGATION AND DEBRIDEMENT SPINE, CLOSE WOUND, COMBINED  3/26/2012    Procedure:COMBINED IRRIGATION AND DEBRIDEMENT SPINE, CLOSE WOUND; Surgeon:KHALIF COFFEY; Location:[facility] OR     removal of cyst of back   2.5week     TONSILLECTOMY       wisdom teeth[         ROS:  CONSTITUTIONAL: NEGATIVE for fever, chills, change in weight  INTEGUMENTARY/SKIN: NEGATIVE for worrisome rashes, moles or lesions  EYES: NEGATIVE for vision changes or irritation  ENT: POSITIVE for seasonal allergies, decreased hearing  RESP: NEGATIVE for significant cough or SOB  CV: NEGATIVE for chest pain, palpitations or peripheral edema  GI: NEGATIVE for nausea, abdominal pain, heartburn, or change in bowel habits   male: negative for dysuria, hematuria, decreased urinary stream, erectile dysfunction, urethral discharge  MUSCULOSKELETAL: POSITIVE for chronic back, butt, hamstring pain, lower right leg pain  NEURO: NEGATIVE for weakness, dizziness or paresthesias  HEME: easy bruising - warfarin  PSYCHIATRIC: NEGATIVE for changes in mood or affect    This document serves as a record of the services and decisions personally performed and made by Obdulio Ingram MD. It was created on his behalf by Lindsay Salamanca, a trained medical scribe. The creation of this document is based on the provider's statements to the medical scribe.  Lindsay Salamanca August 2, 2018 5:07 PM      OBJECTIVE:   /80 (BP Location: Right arm, Patient Position: Sitting, Cuff  "Size: Adult Large)  Pulse 75  Temp 98.2  F (36.8  C) (Oral)  Resp 16  Ht 1.854 m (6' 1\")  Wt 143.3 kg (316 lb)  SpO2 97%  BMI 41.69 kg/m2  EXAM:  GENERAL: healthy, alert and no distress  EYES: Eyes grossly normal to inspection, PERRL and conjunctivae and sclerae normal  HENT: ear canals and TM's normal, nose and mouth without ulcers or lesions  NECK: no adenopathy, no asymmetry, masses, or scars and thyroid normal to palpation  RESP: lungs clear to auscultation - no rales, rhonchi or wheezes  CV: regular rate and rhythm, normal S1 S2, no S3 or S4, no murmur, click or rub, no peripheral edema and peripheral pulses strong  ABDOMEN: soft, nontender, no hepatosplenomegaly, no masses and bowel sounds normal   (male)/RECTAL: deferred to Urology  MS: no gross musculoskeletal defects noted, no edema  SKIN: no suspicious lesions or rashes  NEURO: Normal strength and tone, mentation intact and speech normal  PSYCH: mentation appears normal, affect normal/bright  LYMPH: no cervical, supraclavicular, axillary, or inguinal adenopathy      ASSESSMENT/PLAN:   (Z00.00) Routine general medical examination at a health care facility  (primary encounter diagnosis)  Comment: Stable health. See epic orders.   Plan: Comprehensive metabolic panel, Lipid panel         reflex to direct LDL Fasting, TSH with free T4         reflex, CBC with platelets        (J31.0) Other chronic rhinitis  Plan: azelastine (ASTELIN) 0.1 % spray          (M62.838) Muscle spasm  Plan: cyclobenzaprine (FLEXERIL) 10 MG tablet          (M62.830) Back muscle spasm  Comment: Impacts daily activity. Steroid injection planned for next week - second this month. Pain clinic referral provided. Continue current med.   Plan: diazepam (VALIUM) 5 MG tablet, PAIN MANAGEMENT         REFERRAL          (R41.3) Memory difficulties  Comment: Following with psychiatry for medication management.   Plan: donepezil (ARICEPT) 10 MG tablet          (F33.42) Major depressive " disorder, recurrent episode, in full remission (H)  Comment: Stable. Follows with psychiatrist at [facility]. Continue current meds.   Plan: guanFACINE (TENEX) 1 MG tablet, venlafaxine         (EFFEXOR-ER) 150 MG TB24 24 hr tablet          (E03.9) Acquired hypothyroidism  Comment: Euthyroid clinically. Update thyroid labs. Continue current meds.   Plan: levothyroxine (SYNTHROID/LEVOTHROID) 150 MCG         tablet, TSH with free T4 reflex          (N32.81) Overactive bladder  Comment: Stable. Continue current meds.   Plan: mirabegron (MYRBETRIQ) 50 MG 24 hr tablet          (M54.40,  G89.29) Chronic low back pain with sciatica, sciatica laterality unspecified, unspecified back pain laterality  Comment: Impacts daily activity. Steroid injection planned for next week - second this month. Surgery is not an option at this time. Pain clinic referral provided. Continue current med.   Plan: pregabalin (LYRICA) 100 MG capsule, PAIN         MANAGEMENT REFERRAL          (I48.2) Chronic atrial fibrillation (H)  Comment: Continue current meds.  Plan: propafenone (RYTHMOL) 150 MG TABS tablet          (K59.00) Constipation, unspecified constipation type  Plan: senna-docusate (SENOKOT-S;PERICOLACE) 8.6-50 MG        per tablet          (E78.5) Hyperlipidemia LDL goal <130  Comment: Update lipids. Continue current meds.   Plan: simvastatin (ZOCOR) 40 MG tablet, Comprehensive        metabolic panel, Lipid panel reflex to direct         LDL Fasting          (F51.01) Primary insomnia  Plan: traZODone (DESYREL) 50 MG tablet          (E66.01) Morbid obesity, unspecified obesity type (H)  Comment: Chronic low back pain impedes activity.     (F10.21) Alcohol dependence in remission (H)    (Z95.2) H/O mechanical aortic valve replacement  Plan: Echocardiogram Complete             Someone will contact you to help schedule a cardiac echo.  Everything looks fine!    Refills of medications have been faxed to your pharmacy.     I'll get back to you with  "lab results soon, especially if there is anything of concern.      See you in a year, sooner if problems.        COUNSELING:  Reviewed preventive health counseling, as reflected in patient instructions  Special attention given to:        Regular exercise       Healthy diet/nutrition       Colon cancer screening       Prostate cancer screening    BP Readings from Last 1 Encounters:   08/02/18 130/80     Estimated body mass index is 41.69 kg/(m^2) as calculated from the following:    Height as of this encounter: 1.854 m (6' 1\").    Weight as of this encounter: 143.3 kg (316 lb).    BP Screening:   Last 3 BP Readings:    BP Readings from Last 3 Encounters:   08/02/18 130/80   02/22/18 132/81   02/09/18 130/72       The following was recommended to the patient:  Re-screen BP within a year and recommended lifestyle modifications  Weight management plan: Discussed healthy diet and exercise guidelines and patient will follow up in 12 months in clinic to re-evaluate.     reports that he has never smoked. He has never used smokeless tobacco.      Counseling Resources:  ATP IV Guidelines  Pooled Cohorts Equation Calculator  FRAX Risk Assessment  ICSI Preventive Guidelines  Dietary Guidelines for Americans, 2010  USDA's MyPlate  ASA Prophylaxis  Lung CA Screening    The information in this document, created by the medical scribe for me, accurately reflects the services I personally performed and the decisions made by me. I have reviewed and approved this document for accuracy prior to leaving the patient care area.  August 2, 2018 4:55 PM    Obdulio Ingram MD  [facility]  "

## 2018-08-03 ENCOUNTER — TELEPHONE (OUTPATIENT)
Dept: PALLIATIVE MEDICINE | Facility: CLINIC | Age: 63
End: 2018-08-03

## 2018-08-03 LAB
ALBUMIN SERPL-MCNC: 3.9 G/DL (ref 3.4–5)
ALP SERPL-CCNC: 66 U/L (ref 40–150)
ALT SERPL W P-5'-P-CCNC: 28 U/L (ref 0–70)
ANION GAP SERPL CALCULATED.3IONS-SCNC: 8 MMOL/L (ref 3–14)
AST SERPL W P-5'-P-CCNC: 25 U/L (ref 0–45)
BILIRUB SERPL-MCNC: 1.9 MG/DL (ref 0.2–1.3)
BUN SERPL-MCNC: 13 MG/DL (ref 7–30)
CALCIUM SERPL-MCNC: 8.9 MG/DL (ref 8.5–10.1)
CHLORIDE SERPL-SCNC: 105 MMOL/L (ref 94–109)
CHOLEST SERPL-MCNC: 188 MG/DL
CO2 SERPL-SCNC: 29 MMOL/L (ref 20–32)
CREAT SERPL-MCNC: 1.06 MG/DL (ref 0.66–1.25)
GFR SERPL CREATININE-BSD FRML MDRD: 71 ML/MIN/1.7M2
GLUCOSE SERPL-MCNC: 89 MG/DL (ref 70–99)
HDLC SERPL-MCNC: 41 MG/DL
LDLC SERPL CALC-MCNC: 108 MG/DL
NONHDLC SERPL-MCNC: 147 MG/DL
POTASSIUM SERPL-SCNC: 4.4 MMOL/L (ref 3.4–5.3)
PROT SERPL-MCNC: 7.2 G/DL (ref 6.8–8.8)
SODIUM SERPL-SCNC: 142 MMOL/L (ref 133–144)
TRIGL SERPL-MCNC: 193 MG/DL
TSH SERPL DL<=0.005 MIU/L-ACNC: 1.94 MU/L (ref 0.4–4)

## 2018-08-03 ASSESSMENT — ANXIETY QUESTIONNAIRES: GAD7 TOTAL SCORE: 3

## 2018-08-03 ASSESSMENT — PATIENT HEALTH QUESTIONNAIRE - PHQ9: SUM OF ALL RESPONSES TO PHQ QUESTIONS 1-9: 4

## 2018-08-03 NOTE — LETTER
August 6, 2018    Todd S Aschoff  [address]  [place] [address]    Dear Nicko             Welcome to the [facility] at the [facility]. We are located at [address], [address], [place] [address]. Your appointment has been scheduled on Wednesday August 15, 2018 at 10:00a with Patti Navarrete NP.    At your first visit, you will meet your team of caregivers who will help you to develop pain management strategies that will last a lifetime. You will meet with our support staff to review your insurance information and collect your co-payment if required by your insurance company. You will meet with a medical pain specialist and care coordinator who will assess your pain and develop a plan of care for your successful pain rehabilitation. You should expect to spend 1-2 hours at your first visit with us. Usually, patients work with us for a period of 6-12 months, and eventually return to their primary doctor once their pain management has stabilized.      To help us make your visit go as smoothly as possible, please bring the following items with you on your visit:   Completed Pain Questionnaire enclosed in this packet.  If you do not bring the completed questionnaire, we may have to reschedule your appointment.  List of any medicines that you are currently taking or have been prescribed  Pertinent NON-[place] medical information such as medical records or tests results (X-rays, or laboratory tests)  Your health insurance card  Financial resources to cover your co-payment or balance due at the time of service (cash, personal check, Visa, and MasterCard are acceptable methods of payment)     Due to the high demand for new patient evaluations, you must notify the scheduling department 48 hours in advance if you are not able to keep this appointment.  Failure to do so could affect your ability to reschedule with our clinic. Please do not assume that you  will receive any prescription medications at your first visit.    Please call [phone] with any questions regarding your appointment. We look forward to meeting you and working to address your health care needs.     Sincerely,      [facility]

## 2018-08-06 NOTE — TELEPHONE ENCOUNTER
Pain Management Center Referral      1. Confirmed address with patient? Yes  2. Confirmed phone number with patient? Yes  3. Confirmed referring provider? Yes  4. Is the PCP the same as the referring provider? Yes  5. Has the patient been to any previous pain clinics? No  (If yes, send [facility] with welcome letter)  6. Which insurance are we to bill for this appointment?  BCBS    7. Informed pt of cancellation (48 hour) policy? Yes    REGARDING OPIOID MEDICATIONS: We will always address appropriateness of opioid pain medications, but we generally will not automatically take on a prescribing role. When we do take on prescribing of opioids for chronic pain, it is in collaboration with the referring physician for an intermediate period of time (months), with an expectation that the primary physician or provider will assume the prescribing role if medications are effective at stable doses with demonstrated compliance. Therefore, please do not assume that your prescribing responsibilities end on the day of pain clinic consultation.  7. Informed pt of prescribing policy? Yes      8. Referring Provider: Obdulio Ingram     9. Criteria for Triage Eval:   -Missed/Failed 1st DUAL appointment? N/A   -Medication Focused? N/A   -Mental Health Concerns? (e.g. Recent psych hospitalization/snap shot)? N/A   -Active substance abuse? N/A   -Patient behaviors (e.g. Offensive language/raised voice)? N/A      Gabriele TUCKER    [facility]

## 2018-08-14 ENCOUNTER — ANTICOAGULATION THERAPY VISIT (OUTPATIENT)
Dept: ANTICOAGULATION | Facility: CLINIC | Age: 63
End: 2018-08-14
Payer: COMMERCIAL

## 2018-08-14 DIAGNOSIS — Z95.2 AORTIC VALVE PROSTHESIS PRESENT: ICD-10-CM

## 2018-08-14 DIAGNOSIS — Z79.01 LONG-TERM (CURRENT) USE OF ANTICOAGULANTS: ICD-10-CM

## 2018-08-14 LAB — INR POINT OF CARE: 1.1 (ref 0.86–1.14)

## 2018-08-14 PROCEDURE — 85610 PROTHROMBIN TIME: CPT | Mod: QW

## 2018-08-14 PROCEDURE — 36416 COLLJ CAPILLARY BLOOD SPEC: CPT

## 2018-08-14 PROCEDURE — 99207 ZZC NO CHARGE NURSE ONLY: CPT

## 2018-08-14 NOTE — PROGRESS NOTES
"      [facility]     Date of visit: 8/15/2018    Reason for consultation:    Todd S Aschoff is a 62 year old male who is seen in consultation today at the request of his PCP,  Obdulio Ingram for evaluation of his pain issues and recommendations for management, with specific emphasis on  Reason for Referral: Evaluation for comprehensive services-     Please complete the following questions:    Do you have any specific questions for the pain specialist? No    Are there any red flags that may impact the assessment or management of the patient? Mental Illness / Communication Difficulties (explain): Depression in remission      What is your diagnosis for the patient's pain? Chronic low back pain, disabled.      Please see the [facility] health questionnaire which the patient completed and reviewed with me in detail.    Review of [place] Prescription Monitoring Program (): No concern for abuse or misuse of controlled medications based on this report.     Pain medications are being prescribed by Dr. Ingram.     Subjective:    Chief Complaint:    Chief Complaint   Patient presents with     Pain       Pain history:  Todd S Aschoff is a 62 year old male who presents for initial evaluation of chief complaint of low back pain.      He first started having problems with low back pain in 2013. Insidious onset, without acute precipitating event. He reports mild low back pain initially but surgery was recommended. He had a L3-4 bilateral decompression on 4/3/13 with Dr. Hayden. During the surgery, \"he nicked the spinal cord,\" he had a significant increase in pain afterwards. He was in a nursing home for 9 months recovering, had a wound infected with MRSA that required home care later on. He completed physical therapy in the nursing home but not since. He works with his primary care provider for pain management, has been prescribed Valium by Dr. Ingram for over 5 years. He had x1 " "chiropractic care session, did not like this. He has been following with Dr. Sloan at [facility] in regards to back pain, surgery is not recommended. He states this is due to a \"a black mass,\" on chart review looks as though there was concern for elevated BMI, dural tear, and ongoing Coumadin use. He has had x2 epidural steroid injections, the injection in May was helpful for 3 days. He had a repeat epidural steroid injection yesterday, his left leg is feeling much better today- denies no pain. He is not able to walk to and from his mail box due to pain. The pain is located in low back, radiating down left buttock and anterior thigh. Numbness and tingling in right foot from previous hip surgery, takes Lyrica for this without benefit. Denies weakness.     Pain description:  Location: low back  Quality: sharp, shooting  Severity/Intensity: 5/10 at best, 9/10 at worst, 7/10 on average  Aggravating factors include: walking, getting up from seated position  Relieving factors include: diazepam, Flexeril, Tylenol    The patient otherwise denies bowel or bladder incontinence, parasthesias, weakness, saddle anesthesia, unintentional weight loss, or fever/chills/sweats.     Todd S Aschoff has not been seen at a pain clinic in the past.      Pain Treatments:  (H--helped; HI--Helped initially; [facility]--Somewhat helpful; NH--No help; W--worse; SE--side effects; ?--Unsure if helpful)   1. Medications:       Current pain medications:   cyclobenzaprine 10mg TID prn- [facility], takes BID   Valium 5mg BID- [facility], SE, sedating, most days just takes at bedtime   Lyrica 100mg TID- [facility]/NH for right foot pain   Effexor 150mg- H for mood taking 450mg daily as per psychiatrist direction to increase    Tylenol 650mg- [facility], BID     Trazodone 50mg at bedtime- [facility] for sleep  Current calculated MME: 0    1. Previous Pain Relevant Medications:  NOTE: This medication information taken from patient's intake form, not medical records.    Opiates: " "oxycodone- H   NSAIDS: cannot take due to artificial heart valve/Coumadin    Muscle Relaxants: Flexeril- [facility]   Anti-migraine mediations: no   Anti-depressants: Effexor- H   Sleep aids: trazodone- [facility]   Anxiolytics: no   Neuropathics: gabapentin- SE, \"went nuts,\" felt suicidal, Lyrica- NH/[facility]     Topicals: liquid heat- NH   Other medications not covered above: Tylenol- [facility]    2. Physical Therapy: yes nursing home only- H, none sense  3. Pain Psychology: no  4. Surgery: anterior cervical decompression and fusion C4-6 3/23/12 Dr. Hayden, decompression L3-4 bilateral 4/3/13 Dr. Hayden   5. Injections: x2 epidural steroid injections, [facility]- [facility] 3 days, 8/14/18 [facility] for leg pain  6. Chiropractic: yes- x1 session, didn't like  7. Acupuncture: no  8. TENS Unit: no    Imaging:  MRI of lumbar spine was completed on 4/5/18 (Care Everywhere) and shows:  IMPRESSION:    1.  Laminectomy changes at L3-L4, however there is residual moderate to severe spinal canal narrowing at L3-L4 with prominent lateral recess narrowing, with small superiorly extending left subarticular disc extrusion which causes mass effect upon the exiting left L3 nerve root, with severe proximal left neural foraminal narrowing and moderate right neural foraminal narrowing.  2.  Other degenerative findings as above.  3.  Fusion across the vertebral bodies at L2-L3.  4.  Question slight nonspecific contrast enhancement along the left L4 nerve root within the left dorsolateral spinal canal at the lower level of L3, possibly related to the prominent spinal canal narrowing at L3-L4.   Result Narrative   INDICATION: eval nerve root impingement, left radiculopathy      TECHNIQUE:  MRI of the lumbar spine with and without contrast, 14 mL  GADOBUTROL 1 MMOL/ML IV SOLN.        COMPARISON:  None.           FINDINGS:  Five lumbar-type vertebral bodies.  The conus medullaris terminates at the level of the T12-L1. Normal cord signal.  Bony fusion " across the vertebral bodies at L2-L3. Laminectomy changes at L3-L4. Mild degenerative marrow edema at L3-L4. Fatty degenerative endplate marrow changes at L3-L4 and L5-S1 and L1-L2. Moderate to severe posterior disc height narrowing at L5-S1. Moderate disc height narrowing at L3-L4. Moderate to severe posterior disc height narrowing at L1-L2. Disc desiccation throughout the lumbar spine. Moderate to severe disc height narrowing at T10-T11. Other mild disc height narrowing. Mild rightward convex lower lumbar curvature. Straightening of the lumbar lordosis. Slight grade 1 retrolisthesis at L1-L2, L3-L4, and L5-S1.  Question slight contrast enhancement along the left L4 nerve root within the left dorsolateral spinal canal at the lower level of L3 on series 11 image 23. Postsurgical scarring in the posterior paraspinous soft tissues from L1 L4. Atrophy of the posterior paraspinous musculature most pronounced at L4-L5 and L5-S1. Mild disc osteophyte complex at T10-T11 mildly flattens the ventral thecal sac. Moderate or moderate to severe bilateral neural foraminal narrowing at T10-T11.    Level by level:    T12-L1: Spinal canal and neural foramina are patent. Mild facet arthropathy.     L1-2: Disc osteophyte complex flattens the ventral thecal sac. Mild facet arthropathy. Mild to moderate spinal canal narrowing. Mildly prominent dorsal epidural fat. Near-complete effacement of the thecal sac. Mild lateral recess narrowing. Mild right neural foraminal narrowing. Left neural foramen is patent.    L2-3: Fusion across the vertebral bodies. Posterior osteophytic ridging flattens the ventral thecal sac. Mild to moderate facet arthropathy. Moderate spinal canal narrowing. Mild lateral recess narrowing. Mild to moderate left and mild right neural foraminal narrowing.     L3-4: Laminectomy changes. Slight grade 1 retrolisthesis. Disc osteophyte complex flattens the ventral thecal sac. Mild to moderate facet arthropathy. Moderate  to severe spinal canal narrowing. Prominent lateral recess narrowing. Superiorly extending left subarticular disc extrusion causes mass effect upon the exiting left L3 nerve root. Possible small synovial cyst extending anterior to the left facet joint. Severe proximal left neural foraminal narrowing. Moderate right neural foraminal narrowing.    L4-5: Minimal disc osteophyte complex flattens the ventral thecal sac. Mild to moderate right and mild left facet arthropathy. Mild-to-moderate spinal canal narrowing. Mild bilateral neural foraminal narrowing.    L5-S1: Mild posterior disc osteophyte complex mildly flattens the ventral thecal sac and mildly contacts the descending S1 nerve roots. Mild to moderate facet arthropathy. Mild spinal canal narrowing. Mild bilateral neural foraminal narrowing.       Past Medical History:  Past Medical History:   Diagnosis Date     Alcohol dependence (H)      Allergy, unspecified not elsewhere classified     seasonal     Antiplatelet or antithrombotic long-term use     coumadin/lovenox     Aortic valve prosthesis present      Atrial fibrillation (H)      Blood transfusion     after heart surg 1992     BPH (benign prostatic hypertrophy)      Chronic infection     low back wound incision not healing      Chronic pain     lower back and right leg and left leg     Coagulation disorder (H)     on blood thinners     Dissection of aorta, thoracic (H) 1992    St Jose F aotic valve + arch graft 1992     Headache(784.0)      Heart murmur     aortic valve replaced and arch     History of spinal cord injury      Low back pain      Major depression      Mixed hyperlipidemia      Numbness and tingling     right leg post surg rightt hip/ also left leg since surg     OA (osteoarthritis)     hips     Obesity, unspecified      Prostate infection      Sciatica 2002    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     Unspecified hypothyroidism        Past Surgical History:  Past Surgical History:    Procedure Laterality Date     AORTIC VALVE REPLACEMENT  1992    [facility]. Jose F's valve     C NONSPECIFIC PROCEDURE  1992    repair of TAA with graft     C NONSPECIFIC PROCEDURE  2002    R hip replacement comp's by nerve injury with pain down into R leg, nadya below knee     C TOTAL HIP ARTHROPLASTY  2010    Left AMANDA     CATARACT IOL, RT/LT      rt eye only     CHOLECYSTECTOMY, LAPOROSCOPIC  8/10     COLONOSCOPY  1/15/2015    Dr. Kothari [facility]     COLONOSCOPY N/A 1/15/2015    Procedure: COLONOSCOPY;  Surgeon: Johnson Kothari MD;  Location: [facility]     DECOMPRESSION LUMBAR ONE LEVEL  4/3/2013    Procedure: DECOMPRESSION LUMBAR ONE LEVEL;  Open Decompression L3-4 bilateral;  Surgeon: Travis Coffey MD;  Location: [facility] OR     DECOMPRESSION, FUSION CERVICAL ANTERIOR ONE LEVEL, COMBINED  3/23/2012    Procedure:COMBINED DECOMPRESSION, FUSION CERVICAL ANTERIOR ONE LEVEL; Anterior Cervical Decompression and Fusion C4-6; Surgeon:TRAVIS COFFEY; Location:[facility] OR     EXPLORE SPINE, REMOVE HARDWARE, COMBINED  5/23/2013    Procedure: COMBINED EXPLORE SPINE, REMOVE HARDWARE;  Exploration Lumbar Wound for fluid collection;  Surgeon: Travis Coffey MD;  Location: [facility] OR     FUSION CERVICAL ANTERIOR TWO LEVELS  3/26/2012    Procedure:FUSION CERVICAL ANTERIOR TWO LEVELS; Anterior Cervical Fusion C4-6, Anterior Cervical  Hematoma Evacuation; Surgeon:TRAVIS COFFEY; Location:[facility] OR     IRRIGATION AND DEBRIDEMENT SPINE, CLOSE WOUND, COMBINED  3/26/2012    Procedure:COMBINED IRRIGATION AND DEBRIDEMENT SPINE, CLOSE WOUND; Surgeon:TRAVIS COFFEY; Location:[facility] OR     removal of cyst of back   2.5week     TONSILLECTOMY       wisdom teeth[         Medications:  Current Outpatient Prescriptions   Medication Sig Dispense Refill     Acetaminophen (ACETAMINOPHEN 8 HOUR) 650 MG TABS Take 650 mg by mouth 2 times daily 250 tablet 3     azelastine (ASTELIN) 0.1 % spray USE 2 SPRAYS IN NOSTRIL 2 TIMES DAILY AS NEEDED. 30 mL 11      cetirizine (ZYRTEC) 10 MG tablet TAKE ONE TABLET BY MOUTH ONCE DAILY AS NEEDED  90 tablet 0     cyclobenzaprine (FLEXERIL) 10 MG tablet Take 1 tablet (10 mg) by mouth 3 times daily as needed for muscle spasms 270 tablet 3     diazepam (VALIUM) 5 MG tablet TAKE ONE TABLET BY MOUTH EVERY EIGHT HOURS AS NEEDED FOR ANXIETY OR MUSCLE SPASMS 270 tablet 1     donepezil (ARICEPT) 10 MG tablet Take 1 tablet (10 mg) by mouth At Bedtime 90 tablet 3     enoxaparin (LOVENOX) 150 MG/ML injection Inject 0.8 mLs (120 mg) Subcutaneous every 12 hours (Patient not taking: Reported on 8/2/2018) 10 Syringe 1     guanFACINE (TENEX) 1 MG tablet Take 1 tablet (1 mg) by mouth At Bedtime 90 tablet 3     isometheptene-acetaminophen-dichloralphenazone (MIDRIN) -100 MG per capsule Take 1 capsule by mouth 4 times daily as needed 30 capsule 0     levothyroxine (SYNTHROID/LEVOTHROID) 150 MCG tablet Take 1 tablet (150 mcg) by mouth daily 90 tablet 3     MAPAP ARTHRITIS PAIN 650 MG CR tablet Take 1 tablet (650mg) by mouth 2 times daily.  180 tablet 1     mirabegron (MYRBETRIQ) 50 MG 24 hr tablet Take 1 tablet (50 mg) by mouth daily 90 tablet 3     nicotine polacrilex (COMMIT) 2 MG lozenge Place 1 lozenge (2 mg) inside cheek every hour as needed for smoking cessation 360 lozenge 1     pregabalin (LYRICA) 100 MG capsule Take 1 capsule (100 mg) by mouth 3 times daily Do not take if sleepy/sedated. 270 capsule 3     propafenone (RYTHMOL) 150 MG TABS tablet Take 1 tablet (150 mg) by mouth every 8 hours 270 tablet 3     senna-docusate (SENOKOT-S;PERICOLACE) 8.6-50 MG per tablet Take 1 tablet by mouth 2 times daily as needed for constipation 60 tablet 11     simvastatin (ZOCOR) 40 MG tablet Take 1 tablet (40 mg) by mouth At Bedtime 90 tablet 3     traZODone (DESYREL) 50 MG tablet Take 1 tablet (50 mg) by mouth At Bedtime 90 tablet 3     venlafaxine (EFFEXOR-ER) 150 MG TB24 24 hr tablet Take 1 tablet (150 mg) by mouth daily (with breakfast) 90 each 3      warfarin (COUMADIN) 5 MG tablet Take 1 1/2 tablets (7.5 mg) by mouth Mon and Fri. Take 1 tablet (5 mg) all other days or as instructed by INR Clinic. 100 tablet 1       Allergies:     Allergies   Allergen Reactions     No Known Allergies        Social History:  Home situation: lives in a twin home  Support system: wife, daughter, son in law  Occupation/Schooling: social security/disability  Tobacco use: no  Drug use: no  Alcohol use: no, hx of alcohol abuse, sober since summer of 2009  History of chemical dependency treatment: treatment for alcoholism  Mental health admissions: no    Family history:  Family History   Problem Relation Age of Onset     Cerebrovascular Disease Father       age 86, had M.I. ,diabetic also     Prostate Cancer Father      Cancer Mother       age 83 of dementia, also h/o lymphoma     Hypertension Brother      2 brothers with hypertension & sleep apnea     Hypertension Sister      Sleep Apnea Brother      HTN and sleep apnea     Family history of headaches: yes    Review of Systems:    POSTIVE IN BOLD  GENERAL: fever/chills, fatigue, general unwell feeling, weight gain/loss.  HEAD/EYES:  headache, dizziness, or vision changes.    EARS/NOSE/THROAT: nosebleeds, hearing loss, sinus infection, earache, tinnitus.  IMMUNE:  allergies, cancer, immune deficiency, or infections.  SKIN:  itching, rash, hives  HEME/Lymphatic: anemia, easy bruising, easy bleeding.  RESPIRATORY: cough, wheezing, or shortness of breath  CARDIOVASCULAR/Circulation: extremity edema, syncope, hypertension, tachycardia, or angina.  GASTROINTESTINAL: abdominal pain, nausea/emesis, diarrhea, constipation,  hematochezia, or melena.  ENDOCRINE:  diabetes, steroid use,  thyroid disease or osteoporosis.  MUSCULOSKELETAL: joint pain, stiffness, neck pain, back pain, arthritis, or gout.  GENITOURINARY: frequency, urgency, dysuria, difficulty voiding, hematuria or incontinence (urge only) .  NEUROLOGIC: weakness,  numbness, paresthesias, seizure, tremor, stroke or memory loss.  PSYCHIATRIC: depression, anxiety, stress, suicidal thoughts or mood swings.     Objective:    Physical Exam:  Vitals:    08/15/18 1008   BP: 122/82   Pulse: 86   SpO2: 98%   Weight: 143.3 kg (316 lb)     Exam:  Constitutional: Well developed, well nourished, appears stated age. Obese.   HEENT: Head atraumatic, normocephalic. Eyes without conjunctival injection or jaundice. Oropharynx clear. Neck supple. No obvious neck masses.  Cardiovascular: Regular rate/rhythm; no murmurs/rubs/gallops appreciated.  Respiratory: Lungs clear to auscultation bilaterally. Good aeration. No wheezing/rales/rhonchi.   Skin: No rash, lesions, or petechiae of exposed skin.   Extremities: Peripheral pulses intact. No clubbing, cyanosis, or edema.  Psychiatric/mental status: Alert, without lethargy or stupor. Speech fluent. Appropriate affect. Mood normal. Able to follow commands without difficulty.     Musculoskeletal exam:  Unable to walk on the heels. Able to walk on toes with some difficulty. Patient has antalgic gait favoring the neither side.   Normal bulk and tone. Unremarkable spinal curvature.     Cervical spine:  Range of motion reduced.   Tenderness in the cervical paraspinal muscles.No    Thoracic spine:    Kyphosis. No   Tenderness in the thoracic paraspinal muscles.No    Lumbar spine:    Flex:  90 degrees   Ext: 15 degrees   Tenderness in the lumbar paraspinal muscles.Yes   Rotation/ext to right: painful    Rotation/ext to left: painful     Myofascial tenderness:  Lumbar paraspinals  Focal tenderness: No SI joint, gluteal, piriformis, or GT tenderness  Straight leg raise: negative   FADIR: negative     Hip exam:   Normal internal and external range of motion bilaterally. WILBER negative .     Neurologic exam:  CN:  Cranial nerves 2-12 are grossly intact  Motor:  5/5 UE and LE strength  Strength:       C4 (shoulder shrug)  symmetric 5/5       C5 (shoulder  abduction) symmetric 5/5       C6 (elbow flexion) symmetric 5/5       C7 (elbow extension) symmetric 5/5       C8 (finger abduction, thumb flexion) symmetric 5/5    Reflexes:     Biceps:     R:  1/4 L: 1/4   Brachioradialis   R:  1/4 L: 1/4   Patella:  R:  1/4 L: 1/4   Achilles:  R:  1/4 L: 1/4  Other reflexes:    No ankle clonus     Sensory:   Light touch: normal bilateral upper and lower extremities    Vibration: normal in LE   No allodynia, dysesthesia, or hyperalgesia.    DIRE Score for ongoing opioid management is calculated as follows:   Diagnosis = 2 pts (slowly progressive; moderate pain/objective findings)   Intractability = 2 pts (most treatments tried; patient not fully engaged/barriers)   Risk    Psych = 3 pts (no significant personality dysfunction/mental illness; good communication with clinic)    Chem Hlth = 2 pts (use of medications to cope with stress; chemical dependency in remission)   Reliability = 2 pts (occasional difficulties with compliance; generally reliable)   Social = 3 pts (supportive family/close relationships; involved in work/school; no isolation)   (Psych + Chem hlth + Reliability + Social) = 14     Efficacy = 2 pts (moderate benefit/function; low med dose; too early/not tried meds)         DIRE Score = 16        7-13: likely NOT suitable candidate for long-term opioid analgesia       14-21: may be a suitable candidate for long-term opioid analgesia     Assessment:  Todd S Aschoff is a 62 year old male with a past medical history significant for Tourette syndrome, alcohol dependence in remission, obesity, hypothyroidism, hyperlipidemia, atrial fibrillation, BPH, and depression who presents with complaints of low back pain .     1. Low back pain- etiology likely multilevel degenerative disc disease, apparent dural tear, and extruded disc herniation L3-4, s/p L3-4 laminectomy 2013 Dr. Hayden.   2. Mental Health - the patient's mental health concerns, specifically depression, affect  his experience of pain and contribute to his clinically significant distress.    1. Failed back syndrome    2. Chronic pain syndrome        Plan:  The following recommendations were given to the patient. Diagnosis, treatment options, risks, benefits, and alternatives were discussed, and all questions were answered. The patient expressed understanding of the plan for management.     I am recommending a multidisciplinary treatment plan to help this patient better manage his pain.  This includes:      1.  Pain Physical Therapy:  YES   Schedule first visit with TIFFANY Russell. Try to have at least 2-3 sessions prior to our next visit.    2.  Pain Psychologist to address relaxation, behavioral change, coping style, and other factors important to improvement.  YES-  Nicko is interested in but does not want to travel to [place] for these visits. Plan to hold off on for now, will likely start at [place] later this fall when new pain psychologist starts.    3.  Medication Management:     1. Continue current medication regimen for now. Discussed effects of gabapentin, Lyrica, and topamax. He notes some concern that his mood is worse at times and thinks it could be related to Lyrica. I suggested he consider discussing with his psychiatrist. Could consider addition of topamax but given side effect profile, he is not interested.     2. We discussed that I do not recommend opioids as a long term option for pain management when other options are available. We reviewed the development of tolerance, the significant risks and side effects, and opioid induced hyperalgesia. He verbalized understanding of this and is in agreement.     3. I recommended Nicko consider lidocaine patches 4%, capsaicin cream, or Aspercreme to use in addition.     4.  Consider purchase of a TENS unit.    5.  Potential procedures: could consider in the future, just had epidural steroid injection yesterday. I advised Nicko to monitor how he is feeling after this  injection, hopefully he will have benefit for longer than previous injection.    6.  Follow up with AMARIS Kirkland CNP in 4 weeks.     Review of Electronic Chart: Today I have also reviewed available medical information in the patient's medical record at [facility] ([facility]), including relevant provider notes, laboratory work, and imaging.       I spent 60 minutes of time face to face with the patient.  Greater than 50% of this time was spent in patient counseling and/or coordination of care regarding principles of multidisciplinary care, medication management, and treatment options as discussed above.      AMARIS Kirkland CNP  [facility]

## 2018-08-14 NOTE — PROGRESS NOTES
ANTICOAGULATION FOLLOW-UP CLINIC VISIT    Patient Name:  Todd S Aschoff  Date:  8/14/2018  Contact Type:  Face to Face    SUBJECTIVE:     Patient Findings     Positives Intentional hold of therapy (Last dose of warfarin 8/8/18, pt is holding for back injections today, pt is doing Lovenox bridging. )           OBJECTIVE    INR Protime   Date Value Ref Range Status   08/14/2018 1.1 0.86 - 1.14 Final       ASSESSMENT / PLAN  INR assessment SUB    Recheck INR In: 6 DAYS    INR Location Clinic      Anticoagulation Summary as of 8/14/2018     INR goal 2.5-3.5   Today's INR 1.1!   Warfarin maintenance plan 7.5 mg (5 mg x 1.5) on Mon, Fri; 5 mg (5 mg x 1) all other days   Full warfarin instructions 8/14: 7.5 mg; 8/15: 7.5 mg; Otherwise 7.5 mg on Mon, Fri; 5 mg all other days   Weekly warfarin total 40 mg   Plan last modified Amanda Wheeler RN (7/6/2018)   Next INR check 8/20/2018   Priority INR   Target end date     Indications   Long-term (current) use of anticoagulants [Z79.01] [Z79.01]  Aortic valve prosthesis present [Z95.2]  Atrial fibrillation (H) [I48.91]         Anticoagulation Episode Summary     INR check location     Preferred lab     Send INR reminders to [facility]    Comments       Anticoagulation Care Providers     Provider Role Specialty Phone number    Obdulio Ingram MD [facility] Internal Medicine [phone]            See the Encounter Report to view Anticoagulation Flowsheet and Dosing Calendar (Go to Encounters tab in chart review, and find the Anticoagulation Therapy Visit)    Dosage adjustment made based on physician directed care plan.    Lucina Andrew RN

## 2018-08-14 NOTE — MR AVS SNAPSHOT
Todd S Aschoff   8/14/2018 10:30 AM   Anticoagulation Therapy Visit    Description:  62 year old male   Provider:  [facility]   Department:  [facility]           INR as of 8/14/2018     Today's INR 1.1!      Anticoagulation Summary as of 8/14/2018     INR goal 2.5-3.5   Today's INR 1.1!   Full warfarin instructions 8/14: 7.5 mg; 8/15: 7.5 mg; Otherwise 7.5 mg on Mon, Fri; 5 mg all other days   Next INR check 8/20/2018    Indications   Long-term (current) use of anticoagulants [Z79.01] [Z79.01]  Aortic valve prosthesis present [Z95.2]  Atrial fibrillation (H) [I48.91]         Your next Anticoagulation Clinic appointment(s)     Aug 20, 2018  3:15 PM CDT   [facility]ticoagulation Visit with [facility]   [facility]    [address] [facility] [address]  [facility] [id number] [phone]              Contact Numbers     [facility] Phone Numbers:  Anticoagulation Clinic Appointments : [phone]  Anticoagulation Nurse: [phone]         August 2018 Details    Sun Mon Tue Wed Thu Fri Sat        1               2               3               4                 5               6               7               8               9               10               11                 12               13               14      7.5 mg   See details      15      7.5 mg   See details      16      5 mg   See details      17      7.5 mg   See details      18      5 mg   See details        19      5 mg   See details      20      See details      21               22               23               24               25                 26               27               28               29               30               31                 Date Details   08/14 This INR check   Take 7.5 mg (5 mg tablets x 1.5)   Lovenox PM      08/15 Take 7.5 mg (5 mg tablets x 1.5)   Lovenox AM and PM      08/16 Lovenox AM and PM      08/17 Lovenox AM and PM      08/18 Lovenox  AM and PM      08/19 Lovenox AM and PM      08/20 Lovenox AM       Date of next INR:  8/20/2018         How to take your warfarin dose     To take:  5 mg Take 1 of the 5 mg tablets.    To take:  7.5 mg Take 1.5 of the 5 mg tablets.

## 2018-08-15 ENCOUNTER — OFFICE VISIT (OUTPATIENT)
Dept: PALLIATIVE MEDICINE | Facility: CLINIC | Age: 63
End: 2018-08-15
Attending: INTERNAL MEDICINE
Payer: COMMERCIAL

## 2018-08-15 VITALS
HEART RATE: 86 BPM | SYSTOLIC BLOOD PRESSURE: 122 MMHG | OXYGEN SATURATION: 98 % | DIASTOLIC BLOOD PRESSURE: 82 MMHG | BODY MASS INDEX: 41.69 KG/M2 | WEIGHT: 315 LBS

## 2018-08-15 DIAGNOSIS — G89.4 CHRONIC PAIN SYNDROME: ICD-10-CM

## 2018-08-15 DIAGNOSIS — M96.1 FAILED BACK SYNDROME: Primary | ICD-10-CM

## 2018-08-15 PROCEDURE — 99244 OFF/OP CNSLTJ NEW/EST MOD 40: CPT | Performed by: NURSE PRACTITIONER

## 2018-08-15 ASSESSMENT — PAIN SCALES - GENERAL: PAINLEVEL: MODERATE PAIN (4)

## 2018-08-15 NOTE — MR AVS SNAPSHOT
After Visit Summary   8/15/2018    Todd S Aschoff    MRN: [id number]           Patient Information     Date Of Birth          1955        Visit Information        Provider Department      8/15/2018 10:00 AM Brook Navarrete APRN CNP [facility]        Today's Diagnoses     Failed back syndrome    -  1      Care Instructions     1.  Pain Physical Therapy:  YES   Schedule first visit with TIFFANY Russell. Try to have at least 2-3 sessions prior to our next visit.    2.  Pain Psychologist to address relaxation, behavioral change, coping style, and other factors important to improvement.  YES-  Hold off on for now, can consider Keenan later this fall.    3.  Medication Management:     1. Continue current medication regimen for now. Consider discussing Lyrica with psychiatrist.    2. Consider lidocaine patches 4%, capsaicin cream, or Aspercreme to use in addition.     4.  Potential procedures: could consider in the future. Monitor how you are feeling after this injection, hopefully you will have benefit for longer.    5.  Follow up with AMARIS Kirkland CNP in 4 weeks.     ----------------------------------------------------------------  Clinic Number:  [phone]   Call this number with any questions about your care and for scheduling assistance. Calls are returned Monday through Friday between 8 AM and 4:30 PM. We usually get back to you within 2 business days depending on the issue/request.       Medication refills:    For non-narcotic medications, call your pharmacy directly to request a refill. The pharmacy will contact the [facility] for authorization. Please allow 3-4 days for these refills to be processed.     For narcotic refills, call the nurse triage line or send a [employer] message. Please contact us 7-10 days before your refill is due. The message MUST include the name of the specific medication(s) requested and how you would like to receive the  prescription(s). The options are as follows:    Pain Clinic staff can mail the prescription to your pharmacy. Please tell us the name of the pharmacy.    You may pick the prescription up at the Pain Clinic (tell us the location) or during a clinic visit with your pain provider    Pain Clinic staff can deliver the prescription to the [facility] in the clinic building. Please tell us the location.      We believe regular attendance is key to your success in our program.    Any time you are unable to keep your appointment we ask that you call us at least 24 hours in advance to let us know. This will allow us to offer the appointment time to another patient.               Follow-ups after your visit        Additional Services     PAIN PT EVAL AND TREAT                 Your next 10 appointments already scheduled     Aug 20, 2018  3:15 PM CDT   Anticoagulation Visit with [facility]   [facility]    [address]  [facility] [address]   [phone]            Sep 05, 2018  9:45 AM CDT   Ech Complete with [email or web address]   [facility] ([facility])    [address]  [facility] [place] [facility] [id number] [phone]           1.  Please bring or wear a comfortable two-piece outfit. 2.  You may eat, drink and take your normal medicines. 3.  For any questions that cannot be answered, please contact the ordering physician 4.  Please do not wear perfumes or scented lotions on the day of your exam. ***Please check-in at the [facility] located in [address] in the [facility] building. When you are finished registering, please go to [address] and have a seat. The technician will call your name for the test.              Who to contact     If you have questions or need follow up information about today's clinic visit or your schedule please contact [facility]  directly at [phone].  Normal or non-critical lab and imaging results will be communicated to you by [email or web address], letter or phone within 4 business days after the clinic has received the results. If you do not hear from us within 7 days, please contact the clinic through [email or web address] or phone. If you have a critical or abnormal lab result, we will notify you by phone as soon as possible.  Submit refill requests through [employer] or call your pharmacy and they will forward the refill request to us. Please allow 3 business days for your refill to be completed.          Additional Information About Your Visit        [email or web address] Information     [employer] gives you secure access to your electronic health record. If you see a primary care provider, you can also send messages to your care team and make appointments. If you have questions, please call your primary care clinic.  If you do not have a primary care provider, please call [phone] and they will assist you.        Care EveryWhere ID     This is your Care EveryWhere ID. This could be used by other organizations to access your [facility] medical records  [id number]        Your Vitals Were     Pulse Pulse Oximetry BMI (Body Mass Index)             86 98% 41.69 kg/m2          Blood Pressure from Last 3 Encounters:   08/15/18 122/82   08/02/18 130/80   02/22/18 132/81    Weight from Last 3 Encounters:   08/15/18 143.3 kg (316 lb)   08/02/18 143.3 kg (316 lb)   06/14/18 136.1 kg (300 lb)              We Performed the Following     PAIN PT EVAL AND TREAT          Today's Medication Changes          These changes are accurate as of 8/15/18 10:56 AM.  If you have any questions, ask your nurse or doctor.               Stop taking these medicines if you haven't already. Please contact your care team if you have questions.     NEURONTIN PO   Stopped by:  Brook Navarrete APRN CNP                    Primary Care Provider Office Phone # Fax #    Obdulio Ingram [phone]  [phone]       [address] [facility] [address]  [facility] [address]        Equal Access to Services     PERRI WALTON : Hadii aad ku hademmao Sorosalindaali, waaxda luqadaha, qaybta kaalmada adegaudencio, sharonda geoffreyapril lebron laMarianaroman hunt. So [facility] [phone].    ATENCIÓN: Si habla español, tiene a thomas disposición servicios gratuitos de asistencia lingüística. Llame al [phone].    We comply with applicable federal civil rights laws and [place] laws. We do not discriminate on the basis of race, color, national origin, age, disability, sex, sexual orientation, or gender identity.            Thank you!     Thank you for choosing [facility]  for your care. Our goal is always to provide you with excellent care. Hearing back from our patients is one way we can continue to improve our services. Please take a few minutes to complete the written survey that you may receive in the mail after your visit with us. Thank you!             Your Updated Medication List - Protect others around you: Learn how to safely use, store and throw away your medicines at www.disposemymeds.org.          This list is accurate as of 8/15/18 10:56 AM.  Always use your most recent med list.                   Brand Name Dispense Instructions for use Diagnosis    * acetaminophen 650 MG 8 hour tablet    ACETAMINOPHEN 8 HOUR    250 tablet    Take 650 mg by mouth 2 times daily    Chronic low back pain       * MAPAP ARTHRITIS PAIN 650 MG CR tablet   Generic drug:  acetaminophen     180 tablet    Take 1 tablet (650mg) by mouth 2 times daily.    Chronic low back pain       azelastine 0.1 % nasal spray    ASTELIN    30 mL    USE 2 SPRAYS IN NOSTRIL 2 TIMES DAILY AS NEEDED.    Other chronic rhinitis       cetirizine 10 MG tablet    zyrTEC    90 tablet    TAKE ONE TABLET BY MOUTH ONCE DAILY AS NEEDED    Chronic rhinitis       cyclobenzaprine 10 MG tablet    FLEXERIL    270 tablet    Take 1 tablet (10 mg) by mouth 3 times daily as needed for  muscle spasms    Muscle spasm       diazepam 5 MG tablet    VALIUM    270 tablet    TAKE ONE TABLET BY MOUTH EVERY EIGHT HOURS AS NEEDED FOR ANXIETY OR MUSCLE SPASMS    Back muscle spasm       donepezil 10 MG tablet    ARICEPT    90 tablet    Take 1 tablet (10 mg) by mouth At Bedtime    Memory difficulties       enoxaparin 150 MG/ML injection    LOVENOX    10 Syringe    Inject 0.8 mLs (120 mg) Subcutaneous every 12 hours    Aortic valve prosthesis present, Long term current use of anticoagulant therapy       guanFACINE 1 MG tablet    TENEX    90 tablet    Take 1 tablet (1 mg) by mouth At Bedtime    Major depressive disorder, recurrent episode, in full remission (H)       isometheptene-acetaminophen-dichloralphenazone -100 MG per capsule    MIDRIN    30 capsule    Take 1 capsule by mouth 4 times daily as needed    Headache(784.0)       levothyroxine 150 MCG tablet    SYNTHROID/LEVOTHROID    90 tablet    Take 1 tablet (150 mcg) by mouth daily    Acquired hypothyroidism       mirabegron 50 MG 24 hr tablet    MYRBETRIQ    90 tablet    Take 1 tablet (50 mg) by mouth daily    Overactive bladder       nicotine polacrilex 2 MG lozenge    COMMIT    360 lozenge    Place 1 lozenge (2 mg) inside cheek every hour as needed for smoking cessation    Tobacco abuse       pregabalin 100 MG capsule    LYRICA    270 capsule    Take 1 capsule (100 mg) by mouth 3 times daily Do not take if sleepy/sedated.    Chronic low back pain with sciatica, sciatica laterality unspecified, unspecified back pain laterality       propafenone 150 MG Tabs tablet    RYTHMOL    270 tablet    Take 1 tablet (150 mg) by mouth every 8 hours    Chronic atrial fibrillation (H)       senna-docusate 8.6-50 MG per tablet    SENOKOT-S;PERICOLACE    60 tablet    Take 1 tablet by mouth 2 times daily as needed for constipation    Constipation, unspecified constipation type       simvastatin 40 MG tablet    ZOCOR    90 tablet    Take 1 tablet (40 mg) by mouth At  Bedtime    Hyperlipidemia LDL goal <130       traZODone 50 MG tablet    DESYREL    90 tablet    Take 1 tablet (50 mg) by mouth At Bedtime    Primary insomnia       venlafaxine 150 MG Tb24 24 hr tablet    EFFEXOR-ER    90 each    Take 1 tablet (150 mg) by mouth daily (with breakfast)    Major depressive disorder, recurrent episode, in full remission (H)       warfarin 5 MG tablet    COUMADIN    100 tablet    Take 1 1/2 tablets (7.5 mg) by mouth Mon and Fri. Take 1 tablet (5 mg) all other days or as instructed by INR Clinic.    Current use of long term anticoagulation       * Notice:  This list has 2 medication(s) that are the same as other medications prescribed for you. Read the directions carefully, and ask your doctor or other care provider to review them with you.

## 2018-08-15 NOTE — PATIENT INSTRUCTIONS
1.  Pain Physical Therapy:  YES   Schedule first visit with TIFFANY Russell. Try to have at least 2-3 sessions prior to our next visit.    2.  Pain Psychologist to address relaxation, behavioral change, coping style, and other factors important to improvement.  YES-  Hold off on for now, can consider Keenan later this fall.    3.  Medication Management:     1. Continue current medication regimen for now. Consider discussing Lyrica with psychiatrist.    2. Consider lidocaine patches 4%, capsaicin cream, or Aspercreme to use in addition.     4.  Potential procedures: could consider in the future. Monitor how you are feeling after this injection, hopefully you will have benefit for longer.    5.  Follow up with AMARIS Kirkland CNP in 4 weeks.     ----------------------------------------------------------------  Clinic Number:  [phone]   Call this number with any questions about your care and for scheduling assistance. Calls are returned Monday through Friday between 8 AM and 4:30 PM. We usually get back to you within 2 business days depending on the issue/request.       Medication refills:    For non-narcotic medications, call your pharmacy directly to request a refill. The pharmacy will contact the [facility] for authorization. Please allow 3-4 days for these refills to be processed.     For narcotic refills, call the nurse triage line or send a [employer] message. Please contact us 7-10 days before your refill is due. The message MUST include the name of the specific medication(s) requested and how you would like to receive the prescription(s). The options are as follows:    Pain Clinic staff can mail the prescription to your pharmacy. Please tell us the name of the pharmacy.    You may pick the prescription up at the Pain Clinic (tell us the location) or during a clinic visit with your pain provider    Pain Clinic staff can deliver the prescription to the [facility] pharmacy in the clinic building.  Please tell us the location.      We believe regular attendance is key to your success in our program.    Any time you are unable to keep your appointment we ask that you call us at least 24 hours in advance to let us know. This will allow us to offer the appointment time to another patient.

## 2018-08-20 ENCOUNTER — ANTICOAGULATION THERAPY VISIT (OUTPATIENT)
Dept: NURSING | Facility: CLINIC | Age: 63
End: 2018-08-20
Payer: COMMERCIAL

## 2018-08-20 ENCOUNTER — OFFICE VISIT (OUTPATIENT)
Dept: PALLIATIVE MEDICINE | Facility: CLINIC | Age: 63
End: 2018-08-20
Payer: COMMERCIAL

## 2018-08-20 DIAGNOSIS — Z95.2 AORTIC VALVE PROSTHESIS PRESENT: ICD-10-CM

## 2018-08-20 DIAGNOSIS — Z79.01 LONG-TERM (CURRENT) USE OF ANTICOAGULANTS: ICD-10-CM

## 2018-08-20 DIAGNOSIS — G89.4 CHRONIC PAIN SYNDROME: ICD-10-CM

## 2018-08-20 DIAGNOSIS — M96.1 FAILED BACK SYNDROME: Primary | ICD-10-CM

## 2018-08-20 DIAGNOSIS — I48.91 ATRIAL FIBRILLATION (H): ICD-10-CM

## 2018-08-20 LAB — INR POINT OF CARE: 1.7 (ref 0.86–1.14)

## 2018-08-20 PROCEDURE — 85610 PROTHROMBIN TIME: CPT | Mod: QW

## 2018-08-20 PROCEDURE — 36416 COLLJ CAPILLARY BLOOD SPEC: CPT

## 2018-08-20 PROCEDURE — 99207 ZZC NO CHARGE NURSE ONLY: CPT

## 2018-08-20 PROCEDURE — 97530 THERAPEUTIC ACTIVITIES: CPT | Mod: GP | Performed by: PHYSICAL THERAPIST

## 2018-08-20 PROCEDURE — 97162 PT EVAL MOD COMPLEX 30 MIN: CPT | Mod: GP | Performed by: PHYSICAL THERAPIST

## 2018-08-20 NOTE — PROGRESS NOTES
ANTICOAGULATION FOLLOW-UP CLINIC VISIT    Patient Name:  Todd S Aschoff  Date:  8/20/2018  Contact Type:  Face to Face  Pt usually goes to [place] [facility] clinic. INR today is sub, started lovenox bid from yesterday evening(had 2 doses so far), still has 8 more inj left. Had an epidural inj in lower back on 8/14, had an appointment with pain clinic on 8/15, started PT from today. Radiating L buttock & L leg pain has been much better, but still has increased L lower back. No other new concerns/sx's.     Advised to increase coumadin dosing to 7.5 mg every day(increased weekly total from 45 mg to 52.5 mg), recheck INR in one week in [place] clinic. Pt agrees.     SUBJECTIVE:     Patient Findings     Positives Dental/Other procedures, Inflammation    Comments Denies bleeding/bruising or missed dose.             OBJECTIVE    INR Protime   Date Value Ref Range Status   08/20/2018 1.7 (A) 0.86 - 1.14 Final       ASSESSMENT / PLAN  INR assessment SUB    Recheck INR In: 1 WEEK    INR Location Clinic      Anticoagulation Summary as of 8/20/2018     INR goal 2.5-3.5   Today's INR 1.7!   Warfarin maintenance plan 7.5 mg (5 mg x 1.5) on Mon, Fri; 5 mg (5 mg x 1) all other days   Full warfarin instructions 8/21: 7.5 mg; 8/22: 7.5 mg; 8/23: 7.5 mg; 8/25: 7.5 mg; 8/26: 7.5 mg; Otherwise 7.5 mg on Mon, Fri; 5 mg all other days   Weekly warfarin total 40 mg   Plan last modified Amanda Wheeler RN (7/6/2018)   Next INR check 8/27/2018   Priority INR   Target end date     Indications   Long-term (current) use of anticoagulants [Z79.01] [Z79.01]  Aortic valve prosthesis present [Z95.2]  Atrial fibrillation (H) [I48.91]         Anticoagulation Episode Summary     INR check location     Preferred lab     Send INR reminders to [facility]    Comments       Anticoagulation Care Providers     Provider Role Specialty Phone number    Obdulio Ingram MD Responsible Internal Medicine [phone]            See the Encounter Report to view  Anticoagulation Flowsheet and Dosing Calendar (Go to Encounters tab in chart review, and find the Anticoagulation Therapy Visit)    Meg Werner RN

## 2018-08-20 NOTE — MR AVS SNAPSHOT
After Visit Summary   8/20/2018    Todd S Aschoff    MRN: [id number]           Patient Information     Date Of Birth          1955        Visit Information        Provider Department      8/20/2018 1:00 PM Yvonne Mancia PT [facility]        Today's Diagnoses     Failed back syndrome    -  1    Chronic pain syndrome           Follow-ups after your visit        Your next 10 appointments already scheduled     Aug 27, 2018 11:30 AM CDT   Anticoagulation Visit with [facility]   [facility] ([facility])    [address]  [facility] [address] [phone]            Aug 27, 2018 11:45 AM CDT   Return Visit with Yvonne Mancia PT   [facility] ([facility])    [address]  [address]  [facility] [address]   [phone]            Sep 04, 2018 11:15 AM CDT   Return Visit with Yvonne Mancia PT   [facility] ([facility])    [address]  [address]  [facility] [address]   [phone]            Sep 05, 2018  9:45 AM CDT   Ech Complete with [address] ([facility])    [address]  [facility] [id number] [phone]           1.  Please bring or wear a comfortable two-piece outfit. 2.  You may eat, drink and take your normal medicines. 3.  For any questions that cannot be answered, please contact the ordering physician 4.  Please do not wear perfumes or scented lotions on the day of your exam. ***Please check-in at the [facility] located in [address] in the [facility] building. When you are finished registering, please go to [address] and have a seat. The technician will call your name for the test.            Sep 13, 2018 11:30 AM CDT   Return Visit with AMARIS Cedillo CNP   [facility]  ([facility])    [address]  [address]  [facility] [address]   [phone]              Who to contact     If you have questions or need follow up information about today's clinic visit or your schedule please contact [facility] directly at [phone].  Normal or non-critical lab and imaging results will be communicated to you by MyChart, letter or phone within 4 business days after the clinic has received the results. If you do not hear from us within 7 days, please contact the clinic through MyChart or phone. If you have a critical or abnormal lab result, we will notify you by phone as soon as possible.  Submit refill requests through [employer] or call your pharmacy and they will forward the refill request to us. Please allow 3 business days for your refill to be completed.          Additional Information About Your Visit        MyChart Information     [employer] gives you secure access to your electronic health record. If you see a primary care provider, you can also send messages to your care team and make appointments. If you have questions, please call your primary care clinic.  If you do not have a primary care provider, please call [phone] and they will assist you.        Care EveryWhere ID     This is your Care EveryWhere ID. This could be used by other organizations to access your [facility] medical records  [id number]         Blood Pressure from Last 3 Encounters:   08/15/18 122/82   08/02/18 130/80   02/22/18 132/81    Weight from Last 3 Encounters:   08/15/18 143.3 kg (316 lb)   08/02/18 143.3 kg (316 lb)   06/14/18 136.1 kg (300 lb)              We Performed the Following     HC PT EVAL, MODERATE COMPLEXITY     THERAPEUTIC ACTIVITIES        Primary Care Provider Office Phone # Fax #    Obdulio Ingram [phone]       [address]  [facility] [address]        Equal Access to Services     PERRI WALTON AH: Luna gore  Souzma, waaxda luqadaha, qaybta kaalmada susan, sharonda ambrizalvaro marilee. So [facility] [phone].    ATENCIÓN: Si erica su, tiene a thomas disposición servicios gratuitos de asistencia lingüística. Uriel al [phone].    We comply with applicable federal civil rights laws and [place] laws. We do not discriminate on the basis of race, color, national origin, age, disability, sex, sexual orientation, or gender identity.            Thank you!     Thank you for choosing [facility]  for your care. Our goal is always to provide you with excellent care. Hearing back from our patients is one way we can continue to improve our services. Please take a few minutes to complete the written survey that you may receive in the mail after your visit with us. Thank you!             Your Updated Medication List - Protect others around you: Learn how to safely use, store and throw away your medicines at www.disposemymeds.org.          This list is accurate as of 8/20/18  3:59 PM.  Always use your most recent med list.                   Brand Name Dispense Instructions for use Diagnosis    * acetaminophen 650 MG 8 hour tablet    ACETAMINOPHEN 8 HOUR    250 tablet    Take 650 mg by mouth 2 times daily    Chronic low back pain       * MAPAP ARTHRITIS PAIN 650 MG CR tablet   Generic drug:  acetaminophen     180 tablet    Take 1 tablet (650mg) by mouth 2 times daily.    Chronic low back pain       azelastine 0.1 % nasal spray    ASTELIN    30 mL    USE 2 SPRAYS IN NOSTRIL 2 TIMES DAILY AS NEEDED.    Other chronic rhinitis       cetirizine 10 MG tablet    zyrTEC    90 tablet    TAKE ONE TABLET BY MOUTH ONCE DAILY AS NEEDED    Chronic rhinitis       cyclobenzaprine 10 MG tablet    FLEXERIL    270 tablet    Take 1 tablet (10 mg) by mouth 3 times daily as needed for muscle spasms    Muscle spasm       diazepam 5 MG tablet    VALIUM    270 tablet    TAKE ONE TABLET BY MOUTH EVERY EIGHT HOURS AS NEEDED  FOR ANXIETY OR MUSCLE SPASMS    Back muscle spasm       donepezil 10 MG tablet    ARICEPT    90 tablet    Take 1 tablet (10 mg) by mouth At Bedtime    Memory difficulties       enoxaparin 150 MG/ML injection    LOVENOX    10 Syringe    Inject 0.8 mLs (120 mg) Subcutaneous every 12 hours    Aortic valve prosthesis present, Long term current use of anticoagulant therapy       guanFACINE 1 MG tablet    TENEX    90 tablet    Take 1 tablet (1 mg) by mouth At Bedtime    Major depressive disorder, recurrent episode, in full remission (H)       isometheptene-acetaminophen-dichloralphenazone -100 MG per capsule    MIDRIN    30 capsule    Take 1 capsule by mouth 4 times daily as needed    Headache(784.0)       levothyroxine 150 MCG tablet    SYNTHROID/LEVOTHROID    90 tablet    Take 1 tablet (150 mcg) by mouth daily    Acquired hypothyroidism       mirabegron 50 MG 24 hr tablet    MYRBETRIQ    90 tablet    Take 1 tablet (50 mg) by mouth daily    Overactive bladder       nicotine polacrilex 2 MG lozenge    COMMIT    360 lozenge    Place 1 lozenge (2 mg) inside cheek every hour as needed for smoking cessation    Tobacco abuse       pregabalin 100 MG capsule    LYRICA    270 capsule    Take 1 capsule (100 mg) by mouth 3 times daily Do not take if sleepy/sedated.    Chronic low back pain with sciatica, sciatica laterality unspecified, unspecified back pain laterality       propafenone 150 MG Tabs tablet    RYTHMOL    270 tablet    Take 1 tablet (150 mg) by mouth every 8 hours    Chronic atrial fibrillation (H)       senna-docusate 8.6-50 MG per tablet    SENOKOT-S;PERICOLACE    60 tablet    Take 1 tablet by mouth 2 times daily as needed for constipation    Constipation, unspecified constipation type       simvastatin 40 MG tablet    ZOCOR    90 tablet    Take 1 tablet (40 mg) by mouth At Bedtime    Hyperlipidemia LDL goal <130       traZODone 50 MG tablet    DESYREL    90 tablet    Take 1 tablet (50 mg) by mouth At Bedtime     Primary insomnia       venlafaxine 150 MG Tb24 24 hr tablet    EFFEXOR-ER    90 each    Take 1 tablet (150 mg) by mouth daily (with breakfast)    Major depressive disorder, recurrent episode, in full remission (H)       warfarin 5 MG tablet    COUMADIN    100 tablet    Take 1 1/2 tablets (7.5 mg) by mouth Mon and Fri. Take 1 tablet (5 mg) all other days or as instructed by INR Clinic.    Current use of long term anticoagulation       * Notice:  This list has 2 medication(s) that are the same as other medications prescribed for you. Read the directions carefully, and ask your doctor or other care provider to review them with you.

## 2018-08-20 NOTE — MR AVS SNAPSHOT
Nicko S Aschoff   8/20/2018 3:15 PM   Anticoagulation Therapy Visit    Description:  62 year old male   Provider:   ANTICOAGULATION CLINIC   Department:  [facility]           INR as of 8/20/2018     Today's INR 1.7!      Anticoagulation Summary as of 8/20/2018     INR goal 2.5-3.5   Today's INR 1.7!   Full warfarin instructions 8/21: 7.5 mg; 8/22: 7.5 mg; 8/23: 7.5 mg; 8/25: 7.5 mg; 8/26: 7.5 mg; Otherwise 7.5 mg on Mon, Fri; 5 mg all other days   Next INR check 8/27/2018    Indications   Long-term (current) use of anticoagulants [Z79.01] [Z79.01]  Aortic valve prosthesis present [Z95.2]  Atrial fibrillation (H) [I48.91]         Your next Anticoagulation Clinic appointment(s)     Aug 20, 2018  3:15 PM CDT   Anticoagulation Visit with  ANTICOAGULATION CLINIC   [facility] ([facility])    [address]  [address]  [facility] [address] [phone]            Aug 27, 2018 11:30 AM CDT   Anticoagulation Visit with [facility] ANTICOAGULATION CLINIC   [facility] ([facility])    [address]  [facility] [address] [phone]              Contact Numbers     [facility]  Please call  [phone] to cancel and/or reschedule your appointment   Please call  [phone] with any problems or questions regarding your therapy.        August 2018 Details    Sun Mon Tue Wed Thu Fri Sat        1               2               3               4                 5               6               7               8               9               10               11                 12               13               14               15               16               17               18                 19               20      7.5 mg   See details      21      7.5 mg         22      7.5 mg         23      7.5 mg         24      7.5 mg         25      7.5 mg           26      7.5 mg         27            28               29               30                31                 Date Details   08/20 This INR check   Lovenox AM       Date of next INR:  8/27/2018         How to take your warfarin dose     To take:  7.5 mg Take 1.5 of the 5 mg tablets.

## 2018-08-20 NOTE — PROGRESS NOTES
"PHYSICAL THERAPY INITIAL EVALUATION and PLAN OF CARE    Patient Name: Todd S Aschoff     : 1955    MRN: [id number]   Pain Management Provider:  AMARIS Curry CNP    Diagnosis:    Failed back syndrome  Chronic pain syndrome    SUBJECTIVE:    PRESENTATION AND ETIOLOGY    Chief Complaint: bilateral low back in area of surgical incision, radiating left buttocks and anterior thigh pain better since epidural last week      Onset / Etiology: insidious onset of low back pain in ; worse after L3-4 bilateral decompression 2013; states the surgeon \"nicked the spinal cord\"    Pattern Since Onset: Unchanged    Pain is described as sharp, shooting      Frequency: Constant      Intensity: Best 5/10, Worst 9/10;  Current 4-5/10 ; Average 7/10    Fatigue Level: (scale 0-10)  8/10 average    LEVEL OF FUNCTION AT START OF CARE    Walking tolerance: 5 minutes  Sitting tolerance: 10 minutes  Standing tolerance: 5 minutes  Housework tolerance: , watches grand children ages 2 and 4 yrs; active for 5-10 minutes then rest 30 minutes  Sleep: okay with using medication    Current Aggrevating Activities / Functional Limitations: walking and getting up from seated position; worse waking in the morning and later in the evening; better mid-day      CURRENT / PREVIOUS INTERVENTION(S):     Relieving Activities / Self Care: medication, sitting, lying down    Previous / Current therapies for current chief complaint: PT at nursing home following back surgery ; Surgery including anterior C4-6 decompression ; injections, chiropractic one session did not like    Prior Functional Level: No functional limitations prior to onset of chief complaint.       DEMOGRAPHICS  Employment Status: not working; on SSDI since after surgery    Social Support: lives in twin home with wife, daughter, son-in-law and 2 grandchildren    Home or Community Barriers; Stairs: slowly    Pertinent Medical  / Surgical History: Epic " Snapshot Reviewed, See provider's note; Right AMANDA with nerve damage in early 2000's    Patient's goals for physical therapy: reduce average pain level; walk more up to 30 minutes    ===============================================================  OBJECTIVE:  POSTURE:  Observation: Patient demonstrates forward head, protracted shoulders and thoracic kyphosis in standing and sitting posture.  Not wearing heel lift in right shoe so demonstrates leg length discrepancy.   Shifting position in chair while sitting.    GAIT, LOCOMOTION, and BALANCE:  Gait and Locomotion: slow and antalgic with leg length difference; reports increased back pain, but no leg pain while walking  Balance: next    RANGE OF MOTION:   Cervical: next  Lumbar: AROM WFL all directions  Shoulders: next  Hips: AROM WFL all directions      MUSCLE PERFORMANCE:   Strength: demonstrates core instability; 4/5 MMT left hip flexion, knee extension/flexion      Flexibility: tightness noted in bilateral upper traps    FUNCTIONAL TESTING/OBSERVATION: independent chair and bed mobility; overuse of arms getting out of chair; overuse of arms/upper traps with bed mobility    Pain behaviors: None    SPECIAL TEST(S): negative LE dural tension testing    ===============================================================  Today's Treatment:  Initial evaluation  Therapeutic Activity:   For 30 minutes including beginning breathing and body scan with transitional movements; instruction in body mechanics chair mobility  Focus next session:  Self cares; HEP, walking, consider MFR and SL dural mobility; TENS  ===============================================================  ASSESSMENT:  Physical Therapy Diagnosis:Impaired Posture and Impaired Muscle Performance    Patient requires PT intervention for the following impairments: Limited knowledge of condition and / or self care - inability to control symptoms, Impaired functional mobility, Impaired gait / weight bearing tolerance,  Impaired posture / muscle imbalance, Muscle flexibility limitations, Muscle strength and endurance limitations, Neural mobility limitations, Pain, ROM limitations and Deconditioning    Anticipated Goals and Expected Outcomes:  8 weeks  Patient will report the use of 2 self care practices during their day.  Patient will report the participation in 30 minutes of aerobic activity daily and practicing stretching daily.  Patient will demonstrate the ability to find core strength in neutral posture.  Patient will demonstrate the ability to relax muscle group before stretching.  16 weeks  Patient will be independent with a home exercise program.  Patient will be independent with posture correction.  Patient will report independence with a self care/flare management program.  Patient will demonstrate improved functional strength and endurance as reports by increased tolerance for IADLs and more consistent participation in daily activity.     Rehab potential for achieving goals: good.    ===============================================================  PLAN:   Patient will benefit from skilled physical therapy consisting of:  neuromuscular reeducation of: kinesthetic sense and posture for sitting and/or standing activities, education in self care / home management training to include instruction in: symptom control techniques, therapeutic activities to achieve improved functional performance in: daily actvities and therapeutic exercise to develop: strength and endurance, flexibility and core stability    Assessment will be ongoing with changes in treatment as indicated.  Benefits/risks/alternatives to treatment have been reviewed and the patient has been instructed to contact this office if they have any questions or concerns.  This plan of care has been discussed with the patient and the patient is in agreement.     Frequency / Duration:  Patient will be seen for a total of 10 visits; 16 weeks    Total Visit Time: 50   norbert            Yvnone Gavino, PT                                      Date:  8/20/2018      =====================================================  **  Referring Provider Certification: Referring Provider reviewing certifies that the above treatment / plan of care is required and authorized, and that the patient's plan will be reviewed every thirty (30) days **.   ======================================================     PRESENT:  NA    MULTIDISCIPLINARY PATIENT / FAMILY EDUCATION RECORD  Department:  Physical Therapy    Readiness to Learn: Ability to understand verbal instructions, Ability to understand written instructions, Knowledge of educational needs / treatment plan  Specific Barriers to Learning: None  Referrals: None  Learning Needs: Rehabilitation techniques to improve functional independence Pain management education to improve daily activity tolerance.  Who: Patient  How: Demonstration, Verbal instructions, Written instructions  Response: Appropriate verbal response, Asked questions, Demonstrated ability, Verbalized recall / understanding

## 2018-08-24 ENCOUNTER — TELEPHONE (OUTPATIENT)
Dept: INTERNAL MEDICINE | Facility: CLINIC | Age: 63
End: 2018-08-24

## 2018-08-24 NOTE — TELEPHONE ENCOUNTER
Patient calls with question regarding Guanfacine dosing. He thought previous dose was for 3 times a day and current prescription is for daily. Reviewed Guanfacine order history and informed patient that previous orders were for once daily dosing as well. Patient verbalizes understanding, thinks he must be confusing this with another medication. No further questions.

## 2018-08-27 ENCOUNTER — ANTICOAGULATION THERAPY VISIT (OUTPATIENT)
Dept: ANTICOAGULATION | Facility: CLINIC | Age: 63
End: 2018-08-27
Payer: COMMERCIAL

## 2018-08-27 ENCOUNTER — OFFICE VISIT (OUTPATIENT)
Dept: PALLIATIVE MEDICINE | Facility: CLINIC | Age: 63
End: 2018-08-27
Payer: COMMERCIAL

## 2018-08-27 DIAGNOSIS — G89.4 CHRONIC PAIN SYNDROME: Primary | ICD-10-CM

## 2018-08-27 DIAGNOSIS — Z95.2 AORTIC VALVE PROSTHESIS PRESENT: ICD-10-CM

## 2018-08-27 DIAGNOSIS — M96.1 FAILED BACK SYNDROME: ICD-10-CM

## 2018-08-27 DIAGNOSIS — I48.91 ATRIAL FIBRILLATION (H): ICD-10-CM

## 2018-08-27 DIAGNOSIS — Z79.01 LONG-TERM (CURRENT) USE OF ANTICOAGULANTS: ICD-10-CM

## 2018-08-27 LAB — INR POINT OF CARE: 3.1 (ref 0.86–1.14)

## 2018-08-27 PROCEDURE — 85610 PROTHROMBIN TIME: CPT | Mod: QW

## 2018-08-27 PROCEDURE — 99207 ZZC NO CHARGE NURSE ONLY: CPT

## 2018-08-27 PROCEDURE — 36416 COLLJ CAPILLARY BLOOD SPEC: CPT

## 2018-08-27 PROCEDURE — 97530 THERAPEUTIC ACTIVITIES: CPT | Mod: GP | Performed by: PHYSICAL THERAPIST

## 2018-08-27 NOTE — PROGRESS NOTES
PAIN PHYSICAL THERAPY PROGRESS NOTE  Patient Name: Todd S Aschoff      YOB: 1955     Medical Record Number: [id number]  Diagnosis:    Chronic pain syndrome  Failed back syndrome    Visit: 2/10    Subjective: Patient reports he watched 4 grand children all week.  Woke up with increased low back pain this morning.  Notes muscle tension due to running late for appointment today.  Reports left leg pain from nerve damage s/p back surgery 2013; left leg pain better since recent epidural.  Ongoing right leg pain due to nerve injury s/p AMANDA 2002; left AMANDA 2010.    Self Care  HEP: begin today  Walking/Aerobic Activity: next  Posture: next  Breathing/Relaxation: next  Heat/Ice: next  Mini Breaks: issued handout  Pacing: issued handout      Objective Findings:  OBSERVATION: antalgic gait due to not wearing heel lift in right shoe; encouraged wearing lift.  Pt performed HEP including supine hip abduction, heel slides and SLR.  Required verbal and manual cuing to maintain lumbo-pelvic stability with LE force couple.  Required cuing to maintain LE skeletal alignment and avoid overuse of hip adduction/IR especially on the left.  Reviewed sit to stand chair mechanics with cuing to reduce jaw clenching and upper body overuse.      Treatment Interventions:  Therapeutic Activity:   For 45 minutes as above.  __________________________________________________________________  Instruction on home program: hip abduction, heel slides, SLR    Assessment:  Ongoing Functional Limitations Include:  Patient tolerated/responded well to treatment    Recommendations: obtain heel lift    Intensity Level: 3 (1=low intensity; 5=high intensity)  Demonstrates/Verbalizes Technique: 3 (1= poor technique-difficulty performing exercises,significant cues required; 5= good technique-performs exercises without cues)  Body Awareness: 3 (1=low awareness; 5=high awareness)  Posture/Stability: 3 (1= poor posture, stability; 5= good posture,  stability)  Motivational Level: Cooperative  Response to Teaching: cooperative  Factors that affect learning: None    _______________________________________________________________________  Plan of Care  Continue PT to support reactivation and integration of self regulation pain management skills;  Continue with prescribed plan of care - progress as tolerated.  Focus next session will be on: add bike, clamshells, self  cares, SL dural mobility; TENS     Present:  OSCAR     Total Visit Time:  45 minutes    Therapist: Yvonne Mancia PT             Date: 8/27/2018

## 2018-08-27 NOTE — MR AVS SNAPSHOT
After Visit Summary   8/27/2018    Todd S Aschoff    MRN: [id number]           Patient Information     Date Of Birth          1955        Visit Information        Provider Department      8/27/2018 11:45 AM Yvonne Mancia PT [facility]        Today's Diagnoses     Chronic pain syndrome    -  1    Failed back syndrome           Follow-ups after your visit        Your next 10 appointments already scheduled     Sep 04, 2018 11:15 AM CDT   Return Visit with Yvonne Mancia PT   [facility] ([facility])    [address]  [address]  [place] [address]   [phone]            Sep 05, 2018  9:45 AM CDT   Ech Complete with [address] ([facility])    [address]  [place] [id number] [phone]           1.  Please bring or wear a comfortable two-piece outfit. 2.  You may eat, drink and take your normal medicines. 3.  For any questions that cannot be answered, please contact the ordering physician 4.  Please do not wear perfumes or scented lotions on the day of your exam. ***Please check-in at the [place] Registration Office located in [address] in the [facility] building. When you are finished registering, please go to [address] and have a seat. The technician will call your name for the test.            Sep 05, 2018 11:30 AM CDT   Anticoagulation Visit with [facility]   [facility]    [address]  [place] [id number] [phone]            Sep 13, 2018 11:30 AM CDT   Return Visit with AMARIS Cedillo CNP   [facility] ([facility])    [address]  [address]  [place] [address]   [phone]            Sep 24, 2018 11:45 AM CDT   Return Visit with Yvonne Mancia PT   [facility]  ([facility])    [address]  [address]  [facility] [address]   [phone]              Who to contact     If you have questions or need follow up information about today's clinic visit or your schedule please contact [facility] directly at [phone].  Normal or non-critical lab and imaging results will be communicated to you by MyChart, letter or phone within 4 business days after the clinic has received the results. If you do not hear from us within 7 days, please contact the clinic through MyChart or phone. If you have a critical or abnormal lab result, we will notify you by phone as soon as possible.  Submit refill requests through Lamoda or call your pharmacy and they will forward the refill request to us. Please allow 3 business days for your refill to be completed.          Additional Information About Your Visit        MyChart Information     Lamoda gives you secure access to your electronic health record. If you see a primary care provider, you can also send messages to your care team and make appointments. If you have questions, please call your primary care clinic.  If you do not have a primary care provider, please call [phone] and they will assist you.        Care EveryWhere ID     This is your Care EveryWhere ID. This could be used by other organizations to access your [place] medical records  [id number]         Blood Pressure from Last 3 Encounters:   08/15/18 122/82   08/02/18 130/80   02/22/18 132/81    Weight from Last 3 Encounters:   08/15/18 143.3 kg (316 lb)   08/02/18 143.3 kg (316 lb)   06/14/18 136.1 kg (300 lb)              We Performed the Following     THERAPEUTIC ACTIVITIES        Primary Care Provider Office Phone # Fax #    Obdulio Ingram [phone]       [address]  [facility] [address]        Equal Access to Services     PERRI WALTON : Luna Petersen, sumaya montelongo, jaycob  sharonda nelson martinealvaro margarito molina ah. So [facility] [phone].    ATENCIÓN: Si erica su, tiene a thomas disposición servicios gratuitos de asistencia lingüística. Uriel al [phone].    We comply with applicable federal civil rights laws and [place] laws. We do not discriminate on the basis of race, color, national origin, age, disability, sex, sexual orientation, or gender identity.            Thank you!     Thank you for choosing [facility]  for your care. Our goal is always to provide you with excellent care. Hearing back from our patients is one way we can continue to improve our services. Please take a few minutes to complete the written survey that you may receive in the mail after your visit with us. Thank you!             Your Updated Medication List - Protect others around you: Learn how to safely use, store and throw away your medicines at www.disposemymeds.org.          This list is accurate as of 8/27/18 12:54 PM.  Always use your most recent med list.                   Brand Name Dispense Instructions for use Diagnosis    * acetaminophen 650 MG 8 hour tablet    ACETAMINOPHEN 8 HOUR    250 tablet    Take 650 mg by mouth 2 times daily    Chronic low back pain       * MAPAP ARTHRITIS PAIN 650 MG CR tablet   Generic drug:  acetaminophen     180 tablet    Take 1 tablet (650mg) by mouth 2 times daily.    Chronic low back pain       azelastine 0.1 % nasal spray    ASTELIN    30 mL    USE 2 SPRAYS IN NOSTRIL 2 TIMES DAILY AS NEEDED.    Other chronic rhinitis       cetirizine 10 MG tablet    zyrTEC    90 tablet    TAKE ONE TABLET BY MOUTH ONCE DAILY AS NEEDED    Chronic rhinitis       cyclobenzaprine 10 MG tablet    FLEXERIL    270 tablet    Take 1 tablet (10 mg) by mouth 3 times daily as needed for muscle spasms    Muscle spasm       diazepam 5 MG tablet    VALIUM    270 tablet    TAKE ONE TABLET BY MOUTH EVERY EIGHT HOURS AS NEEDED FOR ANXIETY OR MUSCLE SPASMS     Back muscle spasm       donepezil 10 MG tablet    ARICEPT    90 tablet    Take 1 tablet (10 mg) by mouth At Bedtime    Memory difficulties       enoxaparin 150 MG/ML injection    LOVENOX    10 Syringe    Inject 0.8 mLs (120 mg) Subcutaneous every 12 hours    Aortic valve prosthesis present, Long term current use of anticoagulant therapy       guanFACINE 1 MG tablet    TENEX    90 tablet    Take 1 tablet (1 mg) by mouth At Bedtime    Major depressive disorder, recurrent episode, in full remission (H)       isometheptene-acetaminophen-dichloralphenazone -100 MG per capsule    MIDRIN    30 capsule    Take 1 capsule by mouth 4 times daily as needed    Headache(784.0)       levothyroxine 150 MCG tablet    SYNTHROID/LEVOTHROID    90 tablet    Take 1 tablet (150 mcg) by mouth daily    Acquired hypothyroidism       mirabegron 50 MG 24 hr tablet    MYRBETRIQ    90 tablet    Take 1 tablet (50 mg) by mouth daily    Overactive bladder       nicotine polacrilex 2 MG lozenge    COMMIT    360 lozenge    Place 1 lozenge (2 mg) inside cheek every hour as needed for smoking cessation    Tobacco abuse       pregabalin 100 MG capsule    LYRICA    270 capsule    Take 1 capsule (100 mg) by mouth 3 times daily Do not take if sleepy/sedated.    Chronic low back pain with sciatica, sciatica laterality unspecified, unspecified back pain laterality       propafenone 150 MG Tabs tablet    RYTHMOL    270 tablet    Take 1 tablet (150 mg) by mouth every 8 hours    Chronic atrial fibrillation (H)       senna-docusate 8.6-50 MG per tablet    SENOKOT-S;PERICOLACE    60 tablet    Take 1 tablet by mouth 2 times daily as needed for constipation    Constipation, unspecified constipation type       simvastatin 40 MG tablet    ZOCOR    90 tablet    Take 1 tablet (40 mg) by mouth At Bedtime    Hyperlipidemia LDL goal <130       traZODone 50 MG tablet    DESYREL    90 tablet    Take 1 tablet (50 mg) by mouth At Bedtime    Primary insomnia        venlafaxine 150 MG Tb24 24 hr tablet    EFFEXOR-ER    90 each    Take 1 tablet (150 mg) by mouth daily (with breakfast)    Major depressive disorder, recurrent episode, in full remission (H)       warfarin 5 MG tablet    COUMADIN    100 tablet    Take 1 1/2 tablets (7.5 mg) by mouth Mon and Fri. Take 1 tablet (5 mg) all other days or as instructed by INR Clinic.    Current use of long term anticoagulation       * Notice:  This list has 2 medication(s) that are the same as other medications prescribed for you. Read the directions carefully, and ask your doctor or other care provider to review them with you.

## 2018-08-27 NOTE — PROGRESS NOTES
ANTICOAGULATION FOLLOW-UP CLINIC VISIT    Patient Name:  Todd S Aschoff  Date:  8/27/2018  Contact Type:  Face to Face    SUBJECTIVE:     Patient Findings     Positives Bruising (bruising on stomach from lovenox injections)    Comments Dose was increased at last visit and INR did jump significantly.  Decreased dose slightly to prevent INR from continuing to raise.             OBJECTIVE    INR Protime   Date Value Ref Range Status   08/27/2018 3.1 (A) 0.86 - 1.14 Final       ASSESSMENT / PLAN  INR assessment THER    Recheck INR In: 10 DAYS    INR Location Clinic      Anticoagulation Summary as of 8/27/2018     INR goal 2.5-3.5   Today's INR 3.1   Warfarin maintenance plan 7.5 mg (5 mg x 1.5) on Mon, Fri; 5 mg (5 mg x 1) all other days   Full warfarin instructions 8/28: 7.5 mg; 8/30: 7.5 mg; 9/2: 7.5 mg; 9/4: 7.5 mg; Otherwise 7.5 mg on Mon, Fri; 5 mg all other days   Weekly warfarin total 40 mg   Plan last modified Amanda Wheeler RN (7/6/2018)   Next INR check 9/5/2018   Priority INR   Target end date     Indications   Long-term (current) use of anticoagulants [Z79.01] [Z79.01]  Aortic valve prosthesis present [Z95.2]  Atrial fibrillation (H) [I48.91]         Anticoagulation Episode Summary     INR check location     Preferred lab     Send INR reminders to [facility]    Comments       Anticoagulation Care Providers     Provider Role Specialty Phone number    Obdulio Ingram MD [facility] Internal Medicine [phone]            See the Encounter Report to view Anticoagulation Flowsheet and Dosing Calendar (Go to Encounters tab in chart review, and find the Anticoagulation Therapy Visit)    Dosage adjustment made based on physician directed care plan.    Amanda Wheeler RN

## 2018-08-27 NOTE — MR AVS SNAPSHOT
Todd S Aschoff   8/27/2018 11:30 AM   Anticoagulation Therapy Visit    Description:  62 year old male   Provider:  [facility]   Department:  [facility]           INR as of 8/27/2018     Today's INR 3.1      Anticoagulation Summary as of 8/27/2018     INR goal 2.5-3.5   Today's INR 3.1   Full warfarin instructions 8/28: 7.5 mg; 8/30: 7.5 mg; 9/2: 7.5 mg; 9/4: 7.5 mg; Otherwise 7.5 mg on Mon, Fri; 5 mg all other days   Next INR check 9/5/2018    Indications   Long-term (current) use of anticoagulants [Z79.01] [Z79.01]  Aortic valve prosthesis present [Z95.2]  Atrial fibrillation (H) [I48.91]         Your next Anticoagulation Clinic appointment(s)     Sep 05, 2018 11:30 AM CDT   Anticoagulation Visit with [facility]   [facility]    [address] [facility] [address]  [facility] [id number] [phone]              Contact Numbers     [facility] Phone Numbers:  Anticoagulation Clinic Appointments : [phone]  Anticoagulation Nurse: [phone]         August 2018 Details    Sun Mon Tue Wed Thu Fri Sat        1               2               3               4                 5               6               7               8               9               10               11                 12               13               14               15               16               17               18                 19               20               21               22               23               24               25                 26               27      7.5 mg   See details      28      7.5 mg         29      5 mg         30      7.5 mg         31      7.5 mg           Date Details   08/27 This INR check               How to take your warfarin dose     To take:  5 mg Take 1 of the 5 mg tablets.    To take:  7.5 mg Take 1.5 of the 5 mg tablets.           September 2018 Details    Sun Mon Tue Wed Thu Fri Sat           1      5 mg            2      7.5 mg         3      7.5 mg         4      7.5 mg         5            6               7               8                 9               10               11               12               13               14               15                 16               17               18               19               20               21               22                 23               24               25               26               27               28               29                 30                      Date Details   No additional details    Date of next INR:  9/5/2018         How to take your warfarin dose     To take:  5 mg Take 1 of the 5 mg tablets.    To take:  7.5 mg Take 1.5 of the 5 mg tablets.

## 2018-09-04 ENCOUNTER — OFFICE VISIT (OUTPATIENT)
Dept: PALLIATIVE MEDICINE | Facility: CLINIC | Age: 63
End: 2018-09-04
Payer: COMMERCIAL

## 2018-09-04 DIAGNOSIS — M96.1 FAILED BACK SYNDROME: ICD-10-CM

## 2018-09-04 DIAGNOSIS — G89.4 CHRONIC PAIN SYNDROME: Primary | ICD-10-CM

## 2018-09-04 PROCEDURE — 97530 THERAPEUTIC ACTIVITIES: CPT | Mod: GP | Performed by: PHYSICAL THERAPIST

## 2018-09-04 NOTE — MR AVS SNAPSHOT
After Visit Summary   9/4/2018    Todd S Aschoff    MRN: [id number]           Patient Information     Date Of Birth          1955        Visit Information        Provider Department      9/4/2018 11:15 AM Yvonne Mancia PT [facility]        Today's Diagnoses     Chronic pain syndrome    -  1    Failed back syndrome           Follow-ups after your visit        Your next 10 appointments already scheduled     Sep 05, 2018  9:45 AM CDT   Ech Complete with [id number] ([facility])    [address]  [facility] [id number] [phone]           1.  Please bring or wear a comfortable two-piece outfit. 2.  You may eat, drink and take your normal medicines. 3.  For any questions that cannot be answered, please contact the ordering physician 4.  Please do not wear perfumes or scented lotions on the day of your exam. ***Please check-in at the [place] Registration Office located in [address] in the [facility] building. When you are finished registering, please go to [address] and have a seat. The technician will call your name for the test.            Sep 05, 2018 11:30 AM CDT   Anticoagulation Visit with [facility] CL[facility]IC   [facility] ([facility])    [address]  [facility] [id number] [phone]            Sep 13, 2018 11:30 AM CDT   Return Visit with AMARIS Cedillo CNP   [facility] ([facility])    [address]  [address]  [place] [address]   [phone]            Sep 24, 2018 11:45 AM CDT   Return Visit with Yvonne Mancia PT   [facility] ([facility])    [address]  [address]  [place] [address]   [phone]              Who to contact     If you have questions or need follow up information about today's clinic  visit or your schedule please contact [facility] directly at [phone].  Normal or non-critical lab and imaging results will be communicated to you by MyChart, letter or phone within 4 business days after the clinic has received the results. If you do not hear from us within 7 days, please contact the clinic through Evalvehart or phone. If you have a critical or abnormal lab result, we will notify you by phone as soon as possible.  Submit refill requests through Intoo or call your pharmacy and they will forward the refill request to us. Please allow 3 business days for your refill to be completed.          Additional Information About Your Visit        Evalvehart Information     Intoo gives you secure access to your electronic health record. If you see a primary care provider, you can also send messages to your care team and make appointments. If you have questions, please call your primary care clinic.  If you do not have a primary care provider, please call [phone] and they will assist you.        Care EveryWhere ID     This is your Care EveryWhere ID. This could be used by other organizations to access your [place] medical records  [id number]         Blood Pressure from Last 3 Encounters:   08/15/18 122/82   08/02/18 130/80   02/22/18 132/81    Weight from Last 3 Encounters:   08/15/18 143.3 kg (316 lb)   08/02/18 143.3 kg (316 lb)   06/14/18 136.1 kg (300 lb)              We Performed the Following     THERAPEUTIC ACTIVITIES        Primary Care Provider Office Phone # Fax #    Obdulio Ingram [phone]       [address] [facility] [address]  [facility] [address]        Equal Access to Services     [facility]: Hadii aad ku hadasho Soomaali, waaxda luqadaha, qaybta kaalmada adeegyada, sharonda molina . So [facility] [phone].    ATENCIÓN: Si habla español, tiene a thomas disposición servicios gratuitos de asistencia lingüística. Llame al [phone].    We  comply with applicable federal civil rights laws and [place] laws. We do not discriminate on the basis of race, color, national origin, age, disability, sex, sexual orientation, or gender identity.            Thank you!     Thank you for choosing [facility]  for your care. Our goal is always to provide you with excellent care. Hearing back from our patients is one way we can continue to improve our services. Please take a few minutes to complete the written survey that you may receive in the mail after your visit with us. Thank you!             Your Updated Medication List - Protect others around you: Learn how to safely use, store and throw away your medicines at www.disposemymeds.org.          This list is accurate as of 9/4/18 12:08 PM.  Always use your most recent med list.                   Brand Name Dispense Instructions for use Diagnosis    * acetaminophen 650 MG 8 hour tablet    ACETAMINOPHEN 8 HOUR    250 tablet    Take 650 mg by mouth 2 times daily    Chronic low back pain       * MAPAP ARTHRITIS PAIN 650 MG CR tablet   Generic drug:  acetaminophen     180 tablet    Take 1 tablet (650mg) by mouth 2 times daily.    Chronic low back pain       azelastine 0.1 % nasal spray    ASTELIN    30 mL    USE 2 SPRAYS IN NOSTRIL 2 TIMES DAILY AS NEEDED.    Other chronic rhinitis       cetirizine 10 MG tablet    zyrTEC    90 tablet    TAKE ONE TABLET BY MOUTH ONCE DAILY AS NEEDED    Chronic rhinitis       cyclobenzaprine 10 MG tablet    FLEXERIL    270 tablet    Take 1 tablet (10 mg) by mouth 3 times daily as needed for muscle spasms    Muscle spasm       diazepam 5 MG tablet    VALIUM    270 tablet    TAKE ONE TABLET BY MOUTH EVERY EIGHT HOURS AS NEEDED FOR ANXIETY OR MUSCLE SPASMS    Back muscle spasm       donepezil 10 MG tablet    ARICEPT    90 tablet    Take 1 tablet (10 mg) by mouth At Bedtime    Memory difficulties       enoxaparin 150 MG/ML injection    LOVENOX    10 Syringe    Inject 0.8 mLs  (120 mg) Subcutaneous every 12 hours    Aortic valve prosthesis present, Long term current use of anticoagulant therapy       guanFACINE 1 MG tablet    TENEX    90 tablet    Take 1 tablet (1 mg) by mouth At Bedtime    Major depressive disorder, recurrent episode, in full remission (H)       isometheptene-acetaminophen-dichloralphenazone -100 MG per capsule    MIDRIN    30 capsule    Take 1 capsule by mouth 4 times daily as needed    Headache(784.0)       levothyroxine 150 MCG tablet    SYNTHROID/LEVOTHROID    90 tablet    Take 1 tablet (150 mcg) by mouth daily    Acquired hypothyroidism       mirabegron 50 MG 24 hr tablet    MYRBETRIQ    90 tablet    Take 1 tablet (50 mg) by mouth daily    Overactive bladder       nicotine polacrilex 2 MG lozenge    COMMIT    360 lozenge    Place 1 lozenge (2 mg) inside cheek every hour as needed for smoking cessation    Tobacco abuse       pregabalin 100 MG capsule    LYRICA    270 capsule    Take 1 capsule (100 mg) by mouth 3 times daily Do not take if sleepy/sedated.    Chronic low back pain with sciatica, sciatica laterality unspecified, unspecified back pain laterality       propafenone 150 MG Tabs tablet    RYTHMOL    270 tablet    Take 1 tablet (150 mg) by mouth every 8 hours    Chronic atrial fibrillation (H)       senna-docusate 8.6-50 MG per tablet    SENOKOT-S;PERICOLACE    60 tablet    Take 1 tablet by mouth 2 times daily as needed for constipation    Constipation, unspecified constipation type       simvastatin 40 MG tablet    ZOCOR    90 tablet    Take 1 tablet (40 mg) by mouth At Bedtime    Hyperlipidemia LDL goal <130       traZODone 50 MG tablet    DESYREL    90 tablet    Take 1 tablet (50 mg) by mouth At Bedtime    Primary insomnia       venlafaxine 150 MG Tb24 24 hr tablet    EFFEXOR-ER    90 each    Take 1 tablet (150 mg) by mouth daily (with breakfast)    Major depressive disorder, recurrent episode, in full remission (H)       warfarin 5 MG tablet     COUMADIN    100 tablet    Take 1 1/2 tablets (7.5 mg) by mouth Mon and Fri. Take 1 tablet (5 mg) all other days or as instructed by INR Clinic.    Current use of long term anticoagulation       * Notice:  This list has 2 medication(s) that are the same as other medications prescribed for you. Read the directions carefully, and ask your doctor or other care provider to review them with you.

## 2018-09-04 NOTE — PROGRESS NOTES
PAIN PHYSICAL THERAPY PROGRESS NOTE  Patient Name: Todd S Aschoff      YOB: 1955     Medical Record Number: [id number]  Diagnosis:    Chronic pain syndrome  Failed back syndrome    Visit: 3/10    Subjective: Patient reports he began walking x 30' since last visit.  Noted significant increased back pain near end of walk. Bought heel lift today.  Reports left leg pain from nerve damage s/p back surgery 2013; left leg pain better since recent epidural.    Ongoing right leg pain due to nerve injury s/p AMANDA 2002; left AMANDA 2010.     Self Care  HEP: indep  Walking/Aerobic Activity: issued handout  Posture: next  Breathing/Relaxation: issued CD  Heat/Ice: next  Mini Breaks: issued handout  Pacing: issued handout      Objective Findings:  Reviewed HEP from last visit.  Instructed in calf stretch, single leg standing balance and toe raises.  Instructed in L >R  foot/ankle gait mechanics in walking FW, BW, and side step at counter.      Treatment Interventions:  Therapeutic Activity:   For 45 minutes as above.  __________________________________________________________________  Instruction on home program: hip abduction, heel slides, SLR, calf, balance, toe raises    Assessment:  Ongoing Functional Limitations Include:  Patient tolerated/responded well to treatment    Intensity Level: 3 (1=low intensity; 5=high intensity)  Demonstrates/Verbalizes Technique: 4 (1= poor technique-difficulty performing exercises,significant cues required; 5= good technique-performs exercises without cues)  Body Awareness: 3 (1=low awareness; 5=high awareness)  Posture/Stability: 3 (1= poor posture, stability; 5= good posture, stability)  Motivational Level: Cooperative  Response to Teaching: cooperative  Factors that affect learning: None    _______________________________________________________________________  Plan of Care  Continue PT to support reactivation and integration of self regulation pain management skills;  Continue  with prescribed plan of care - progress as tolerated.  Focus next session will be on: monitor walking/self cares, add clamshells, SL dural mobility and TENS, consider differentiated ankle/foot or EV strengthening on left     Present:  NA     Total Visit Time:  45 minutes    Therapist: Yvonne Mancia PT             Date: 9/4/2018

## 2018-09-05 ENCOUNTER — ANTICOAGULATION THERAPY VISIT (OUTPATIENT)
Dept: ANTICOAGULATION | Facility: CLINIC | Age: 63
End: 2018-09-05
Payer: COMMERCIAL

## 2018-09-05 ENCOUNTER — HOSPITAL ENCOUNTER (OUTPATIENT)
Dept: CARDIOLOGY | Facility: CLINIC | Age: 63
Discharge: HOME | End: 2018-09-05
Attending: INTERNAL MEDICINE | Admitting: INTERNAL MEDICINE
Payer: COMMERCIAL

## 2018-09-05 DIAGNOSIS — Z79.01 LONG-TERM (CURRENT) USE OF ANTICOAGULANTS: ICD-10-CM

## 2018-09-05 DIAGNOSIS — Z95.2 H/O MECHANICAL AORTIC VALVE REPLACEMENT: ICD-10-CM

## 2018-09-05 DIAGNOSIS — I48.91 ATRIAL FIBRILLATION (H): ICD-10-CM

## 2018-09-05 DIAGNOSIS — Z95.2 AORTIC VALVE PROSTHESIS PRESENT: ICD-10-CM

## 2018-09-05 LAB — INR POINT OF CARE: 3 (ref 0.86–1.14)

## 2018-09-05 PROCEDURE — 25500064 ZZH RX 255 OP 636: Performed by: INTERNAL MEDICINE

## 2018-09-05 PROCEDURE — 93306 TTE W/DOPPLER COMPLETE: CPT | Mod: 26 | Performed by: INTERNAL MEDICINE

## 2018-09-05 PROCEDURE — 99207 ZZC NO CHARGE NURSE ONLY: CPT

## 2018-09-05 PROCEDURE — 85610 PROTHROMBIN TIME: CPT | Mod: QW

## 2018-09-05 PROCEDURE — 36416 COLLJ CAPILLARY BLOOD SPEC: CPT

## 2018-09-05 RX ADMIN — HUMAN ALBUMIN MICROSPHERES AND PERFLUTREN 3 ML: 10; .22 INJECTION, SOLUTION INTRAVENOUS at 10:45

## 2018-09-05 NOTE — PROGRESS NOTES
ANTICOAGULATION FOLLOW-UP CLINIC VISIT    Patient Name:  Todd S Aschoff  Date:  9/5/2018  Contact Type:  Face to Face    SUBJECTIVE:     Patient Findings     Positives No Problem Findings           OBJECTIVE    INR Protime   Date Value Ref Range Status   09/05/2018 3.0 (A) 0.86 - 1.14 Final       ASSESSMENT / PLAN  INR assessment THER    Recheck INR In: 2 WEEKS    INR Location Clinic      Anticoagulation Summary as of 9/5/2018     INR goal 2.5-3.5   Today's INR 3.0   Warfarin maintenance plan 5 mg (5 mg x 1) on Wed, Sat; 7.5 mg (5 mg x 1.5) all other days   Full warfarin instructions 5 mg on Wed, Sat; 7.5 mg all other days   Weekly warfarin total 47.5 mg   Plan last modified Amanda Wheeler RN (9/5/2018)   Next INR check 9/17/2018   Priority INR   Target end date     Indications   Long-term (current) use of anticoagulants [Z79.01] [Z79.01]  Aortic valve prosthesis present [Z95.2]  Atrial fibrillation (H) [I48.91]         Anticoagulation Episode Summary     INR check location     Preferred lab     Send INR reminders to [facility]    Comments       Anticoagulation Care Providers     Provider Role Specialty Phone number    Obdulio Ingram MD Responsible Internal Medicine [phone]            See the Encounter Report to view Anticoagulation Flowsheet and Dosing Calendar (Go to Encounters tab in chart review, and find the Anticoagulation Therapy Visit)    Dosage adjustment made based on physician directed care plan.    Amanda Wheeler RN

## 2018-09-05 NOTE — MR AVS SNAPSHOT
Todd S Aschoff   9/5/2018 11:30 AM   Anticoagulation Therapy Visit    Description:  62 year old male   Provider:  [facility]   Department:  [facility]           INR as of 9/5/2018     Today's INR 3.0      Anticoagulation Summary as of 9/5/2018     INR goal 2.5-3.5   Today's INR 3.0   Full warfarin instructions 5 mg on Wed, Sat; 7.5 mg all other days   Next INR check 9/17/2018    Indications   Long-term (current) use of anticoagulants [Z79.01] [Z79.01]  Aortic valve prosthesis present [Z95.2]  Atrial fibrillation (H) [I48.91]         Your next Anticoagulation Clinic appointment(s)     Sep 05, 2018 11:30 AM CDT   [facility] Visit with [facility]   [facility]    [address] [facility] [address]  [facility] [id number] [phone]            Sep 17, 2018 11:45 AM CDT   Anticoagulation Visit with [facility]   [facility]    [address]  [facility] [address] [phone]              Contact Numbers     [facility] Phone Numbers:  Anticoagulation Clinic Appointments : [phone]  Anticoagulation Nurse: [phone]         September 2018 Details    Sun Mon Tue Wed Thu Fri Sat           1                 2               3               4               5      5 mg   See details      6      7.5 mg         7      7.5 mg         8      5 mg           9      7.5 mg         10      7.5 mg         11      7.5 mg         12      5 mg         13      7.5 mg         14      7.5 mg         15      5 mg           16      7.5 mg         17            18               19               20               21               22                 23               24               25               26               27               28               29                 30                      Date Details   09/05 This INR check       Date of next INR:  9/17/2018          How to take your warfarin dose     To take:  5 mg Take 1 of the 5 mg tablets.    To take:  7.5 mg Take 1.5 of the 5 mg tablets.

## 2018-09-12 NOTE — PROGRESS NOTES
[facility]    Date of visit: 9/13/2018    Chief complaint:   Chief Complaint   Patient presents with     Pain       Interval history:  Todd S Aschoff is a 62 year old male last seen by me on 8/15/18.      Recommendations/plan at the last visit included:             1.  Pain Physical Therapy:  YES   Schedule first visit with TIFFANY Russell. Try to have at least 2-3 sessions prior to our next visit.                         2.  Pain Psychologist to address relaxation, behavioral change, coping style, and other factors important to improvement.  YES-  Nicko is interested in but does not want to travel to [place] for these visits. Plan to hold off on for now, will likely start at [place] later this fall when new pain psychologist starts.                         3.  Medication Management:                                               1. Continue current medication regimen for now. Discussed effects of gabapentin, Lyrica, and topamax. He notes some concern that his mood is worse at times and thinks it could be related to Lyrica. I suggested he consider discussing with his psychiatrist. Could consider addition of topamax but given side effect profile, he is not interested.                                               2. We discussed that I do not recommend opioids as a long term option for pain management when other options are available. We reviewed the development of tolerance, the significant risks and side effects, and opioid induced hyperalgesia. He verbalized understanding of this and is in agreement.                                               3. I recommended Nicko consider lidocaine patches 4%, capsaicin cream, or Aspercreme to use in addition.                          4.  Consider purchase of a TENS unit.                         5.  Potential procedures: could consider in the future, just had epidural steroid injection yesterday. I advised Nicko to monitor how he is feeling after this  "injection, hopefully he will have benefit for longer than previous injection.                         6.  Follow up with AMARIS Kirkland CNP in 4 weeks.     Since his last visit, Todd S Aschoff reports:  -His pain is much better than as it was at last visit.   -He attributes this to be due to pain physical therapy.   -He has had x3 sessions with TIFFANY Russell. He states this is going\"great\" and has noticed improvement already. She has taught him some exercises that have been very helpful for his low back pain.   -He has been walking on a more regular basis, goes on 15 minute walks at a time but continues to increase.  -He would still like to see pain psychologist that will be starting in [facility] waiting until October.   -He spoke with his psychiatrist regarding him concern for Lyrica and possible impact on mood. He states it was not thought that this medication is effecting his mood, recommended he continue.  -He has not needed to take Lyrica during the day since he started pain physical therapy, only has been taking at bedtime. He finds this helpful in the evening in conjunction with Valium.   -His daughter ordered a TENS unit for him. He is looking forward to using this, plans to go over the use of this at his next pain physical therapy session.   -He had his epidural steroid injection on 8/14/18, he continues to report very good benefit from this injection. He is hoping for a year of relief as he has had in the past.     Pain Information:   Pain quality: tiring, sharp, miserable   Pain timing: Intermittent     Pain rating: intensity ranges from 2/10 to 8/10, and averages 2/10 on a 0-10 scale.   Aggravating factors include: walking   Relieving factors include: Flexeril    Current pain treatments:    cyclobenzaprine 10mg TID prn- [facility], takes BID to TID depending if walking   Valium 5mg BID- [facility], SE, sedating, down to taking just at bedtime    Lyrica 100mg TID- [facility] taking only at bedtime   Effexor 150mg- " "H for mood taking 450mg daily as per psychiatrist direction to increase    Tylenol 650mg- [facility], BID    Trazodone 50mg at bedtime- [facility] for sleep    Current MME: 0    Annual Controlled Substance Agreement/UDS due date: N/A    Past pain treatments:  1. Previous Pain Relevant Medications:  NOTE: This medication information taken from patient's intake form, not medical records.                         Opiates: oxycodone- H                        NSAIDS: cannot take due to artificial heart valve/Coumadin                        Muscle Relaxants: Flexeril- [facility]                        Anti-migraine mediations: no                        Anti-depressants: Effexor- H                        Sleep aids: trazodone- [facility]                        Anxiolytics: no                        Neuropathics: gabapentin- [facility], \"went nuts,\" felt suicidal, Lyrica- [facility]                                    Topicals: liquid heat- [facility]                        Other medications not covered above: Tylenol- [facility]     2. Physical Therapy: yes nursing home only- H, none sense  3. Pain Psychology: no  4. Surgery: anterior cervical decompression and fusion C4-6 3/23/12 Dr. Hayden, decompression L3-4 bilateral 4/3/13 Dr. Hayden   5. Injections: 8/14/18 [facility]- H for leg pain  x2 epidural steroid injections, May [facility]- H 3 days,   6. Chiropractic: yes- x1 session, didn't like  7. Acupuncture: no  8. TENS Unit: no    Medications:  Current Outpatient Prescriptions   Medication Sig Dispense Refill     Acetaminophen (ACETAMINOPHEN 8 HOUR) 650 MG TABS Take 650 mg by mouth 2 times daily 250 tablet 3     azelastine (ASTELIN) 0.1 % spray USE 2 SPRAYS IN NOSTRIL 2 TIMES DAILY AS NEEDED. 30 mL 11     cetirizine (ZYRTEC) 10 MG tablet TAKE ONE TABLET BY MOUTH ONCE DAILY AS NEEDED  90 tablet 0     cyclobenzaprine (FLEXERIL) 10 MG tablet Take 1 tablet (10 mg) by mouth 3 times daily as needed for muscle spasms 270 tablet 3     diazepam (VALIUM) 5 MG tablet TAKE ONE " TABLET BY MOUTH EVERY EIGHT HOURS AS NEEDED FOR ANXIETY OR MUSCLE SPASMS 270 tablet 1     donepezil (ARICEPT) 10 MG tablet Take 1 tablet (10 mg) by mouth At Bedtime 90 tablet 3     enoxaparin (LOVENOX) 150 MG/ML injection Inject 0.8 mLs (120 mg) Subcutaneous every 12 hours 10 Syringe 1     guanFACINE (TENEX) 1 MG tablet Take 1 tablet (1 mg) by mouth At Bedtime 90 tablet 3     isometheptene-acetaminophen-dichloralphenazone (MIDRIN) -100 MG per capsule Take 1 capsule by mouth 4 times daily as needed 30 capsule 0     levothyroxine (SYNTHROID/LEVOTHROID) 150 MCG tablet Take 1 tablet (150 mcg) by mouth daily 90 tablet 3     MAPAP ARTHRITIS PAIN 650 MG CR tablet Take 1 tablet (650mg) by mouth 2 times daily.  180 tablet 1     mirabegron (MYRBETRIQ) 50 MG 24 hr tablet Take 1 tablet (50 mg) by mouth daily 90 tablet 3     nicotine polacrilex (COMMIT) 2 MG lozenge Place 1 lozenge (2 mg) inside cheek every hour as needed for smoking cessation 360 lozenge 1     pregabalin (LYRICA) 100 MG capsule Take 1 capsule (100 mg) by mouth 3 times daily Do not take if sleepy/sedated. 270 capsule 3     propafenone (RYTHMOL) 150 MG TABS tablet Take 1 tablet (150 mg) by mouth every 8 hours 270 tablet 3     senna-docusate (SENOKOT-S;PERICOLACE) 8.6-50 MG per tablet Take 1 tablet by mouth 2 times daily as needed for constipation 60 tablet 11     simvastatin (ZOCOR) 40 MG tablet Take 1 tablet (40 mg) by mouth At Bedtime 90 tablet 3     traZODone (DESYREL) 50 MG tablet Take 1 tablet (50 mg) by mouth At Bedtime 90 tablet 3     venlafaxine (EFFEXOR-ER) 150 MG TB24 24 hr tablet Take 1 tablet (150 mg) by mouth daily (with breakfast) 90 each 3     warfarin (COUMADIN) 5 MG tablet Take 1 1/2 tablets (7.5 mg) by mouth Mon and Fri. Take 1 tablet (5 mg) all other days or as instructed by INR Clinic. 100 tablet 1       Medical History: any changes in medical history since they were last seen? No    Review of Systems:  The 14 system ROS was reviewed  "from the intake questionnaire, and is positive for: hearing loss, ringing in ears, easy bruising, palpitations, thyroid disease, back pain  Any bowel or bladder problems: denies  Mood: \"pretty good\"     Physical Exam:  Blood pressure 110/74, pulse 76, SpO2 96 %.  General: A&O x4, no signs of distress. Obese.   Gait: Normal.  MSK exam: 5/5 upper and lower extremity strength.    Imaging:  MRI of lumbar spine was completed on 4/5/18 (Care Everywhere) and shows:  IMPRESSION:    1.  Laminectomy changes at L3-L4, however there is residual moderate to severe spinal canal narrowing at L3-L4 with prominent lateral recess narrowing, with small superiorly extending left subarticular disc extrusion which causes mass effect upon the exiting left L3 nerve root, with severe proximal left neural foraminal narrowing and moderate right neural foraminal narrowing.  2.  Other degenerative findings as above.  3.  Fusion across the vertebral bodies at L2-L3.  4.  Question slight nonspecific contrast enhancement along the left L4 nerve root within the left dorsolateral spinal canal at the lower level of L3, possibly related to the prominent spinal canal narrowing at L3-L4.   Result Narrative   INDICATION: eval nerve root impingement, left radiculopathy      TECHNIQUE:  MRI of the lumbar spine with and without contrast, 14 mL  GADOBUTROL 1 MMOL/ML IV SOLN.        COMPARISON:  None.           FINDINGS:  Five lumbar-type vertebral bodies.  The conus medullaris terminates at the level of the T12-L1. Normal cord signal.  Bony fusion across the vertebral bodies at L2-L3. Laminectomy changes at L3-L4. Mild degenerative marrow edema at L3-L4. Fatty degenerative endplate marrow changes at L3-L4 and L5-S1 and L1-L2. Moderate to severe posterior disc height narrowing at L5-S1. Moderate disc height narrowing at L3-L4. Moderate to severe posterior disc height narrowing at L1-L2. Disc desiccation throughout the lumbar spine. Moderate to severe disc " height narrowing at T10-T11. Other mild disc height narrowing. Mild rightward convex lower lumbar curvature. Straightening of the lumbar lordosis. Slight grade 1 retrolisthesis at L1-L2, L3-L4, and L5-S1.  Question slight contrast enhancement along the left L4 nerve root within the left dorsolateral spinal canal at the lower level of L3 on series 11 image 23. Postsurgical scarring in the posterior paraspinous soft tissues from L1 L4. Atrophy of the posterior paraspinous musculature most pronounced at L4-L5 and L5-S1. Mild disc osteophyte complex at T10-T11 mildly flattens the ventral thecal sac. Moderate or moderate to severe bilateral neural foraminal narrowing at T10-T11.    Level by level:    T12-L1: Spinal canal and neural foramina are patent. Mild facet arthropathy.     L1-2: Disc osteophyte complex flattens the ventral thecal sac. Mild facet arthropathy. Mild to moderate spinal canal narrowing. Mildly prominent dorsal epidural fat. Near-complete effacement of the thecal sac. Mild lateral recess narrowing. Mild right neural foraminal narrowing. Left neural foramen is patent.    L2-3: Fusion across the vertebral bodies. Posterior osteophytic ridging flattens the ventral thecal sac. Mild to moderate facet arthropathy. Moderate spinal canal narrowing. Mild lateral recess narrowing. Mild to moderate left and mild right neural foraminal narrowing.     L3-4: Laminectomy changes. Slight grade 1 retrolisthesis. Disc osteophyte complex flattens the ventral thecal sac. Mild to moderate facet arthropathy. Moderate to severe spinal canal narrowing. Prominent lateral recess narrowing. Superiorly extending left subarticular disc extrusion causes mass effect upon the exiting left L3 nerve root. Possible small synovial cyst extending anterior to the left facet joint. Severe proximal left neural foraminal narrowing. Moderate right neural foraminal narrowing.    L4-5: Minimal disc osteophyte complex flattens the ventral thecal  sac. Mild to moderate right and mild left facet arthropathy. Mild-to-moderate spinal canal narrowing. Mild bilateral neural foraminal narrowing.    L5-S1: Mild posterior disc osteophyte complex mildly flattens the ventral thecal sac and mildly contacts the descending S1 nerve roots. Mild to moderate facet arthropathy. Mild spinal canal narrowing. Mild bilateral neural foraminal narrowing.     Assessment:   Todd S Aschoff is a 62 year old male with a past medical history significant for Tourette syndrome, alcohol dependence in remission, obesity, hypothyroidism, hyperlipidemia, atrial fibrillation, BPH, and depression who presents with complaints of low back pain .      1. Low back pain- etiology likely multilevel degenerative disc disease, apparent dural tear, and extruded disc herniation L3-4, s/p L3-4 laminectomy 2013 Dr. Hayden.   2. Mental Health - the patient's mental health concerns, specifically depression, affect his experience of pain and contribute to his clinically significant distress.      1. Failed back syndrome    2. Chronic pain syndrome        Plan:     1.  Pain Physical Therapy:  YES   Continue with remaining sessions with TIFFANY Russell as recommended. Continue working on walking regimen. Start using TENS unit as well.    2.  Pain Psychologist to address relaxation, behavioral change, coping style, and other factors important to improvement.  YES  Would highly recommend, Nicko is interested in. Will likely order at next visit or sooner if pain psychologist available sooner.    3.  Medication Management:     1. Continue current medication regimen. I recommended Nicko add in the morning dose of Lyrica when ready to do so and monitor how this impacts  pain.     4.  Potential procedures: could consider in the future if needed.      5.  Follow up with AMARIS Kirkland CNP in 6 weeks.       I spent 30 minutes of time face to face with the patient.  Greater than 50% of this time was spent in patient  counseling and/or coordination of care.    Patti GLEZ [facility] [place]

## 2018-09-13 ENCOUNTER — OFFICE VISIT (OUTPATIENT)
Dept: PALLIATIVE MEDICINE | Facility: CLINIC | Age: 63
End: 2018-09-13
Payer: COMMERCIAL

## 2018-09-13 VITALS — OXYGEN SATURATION: 96 % | DIASTOLIC BLOOD PRESSURE: 74 MMHG | SYSTOLIC BLOOD PRESSURE: 110 MMHG | HEART RATE: 76 BPM

## 2018-09-13 DIAGNOSIS — G89.4 CHRONIC PAIN SYNDROME: ICD-10-CM

## 2018-09-13 DIAGNOSIS — M96.1 FAILED BACK SYNDROME: Primary | ICD-10-CM

## 2018-09-13 PROCEDURE — 99214 OFFICE O/P EST MOD 30 MIN: CPT | Performed by: NURSE PRACTITIONER

## 2018-09-13 ASSESSMENT — PAIN SCALES - GENERAL: PAINLEVEL: MODERATE PAIN (5)

## 2018-09-13 NOTE — PATIENT INSTRUCTIONS
1.  Pain Physical Therapy:  YES   Continue to see TIFFANY Russell as recommended.    2.  Pain Psychologist to address relaxation, behavioral change, coping style, and other factors important to improvement.  YES  Would highly recommend. Will likely order at next visit.    3.  Medication Management:     1. Continue current medication regimen. Add in the morning dose of Lyrica when ready to do so, monitor how this impacts your pain.     4.  Potential procedures: could consider in the future.     5.  Follow up with AMARIS Kirkland CNP in 6 weeks.       ----------------------------------------------------------------  Clinic Number:  [phone]   Call this number with any questions about your care and for scheduling assistance. Calls are returned Monday through Friday between 8 AM and 4:30 PM. We usually get back to you within 2 business days depending on the issue/request.       Medication refills:    For non-narcotic medications, call your pharmacy directly to request a refill. The pharmacy will contact the [facility] for authorization. Please allow 3-4 days for these refills to be processed.     For narcotic refills, call the nurse triage line or send a [employer] message. Please contact us 7-10 days before your refill is due. The message MUST include the name of the specific medication(s) requested and how you would like to receive the prescription(s). The options are as follows:    Pain Clinic staff can mail the prescription to your pharmacy. Please tell us the name of the pharmacy.    You may pick the prescription up at the Pain Clinic (tell us the location) or during a clinic visit with your pain provider    Pain Clinic staff can deliver the prescription to the [facility] pharmacy in the clinic building. Please tell us the location.      We believe regular attendance is key to your success in our program.    Any time you are unable to keep your appointment we ask that you call us at least 24 hours in  advance to let us know. This will allow us to offer the appointment time to another patient.

## 2018-09-13 NOTE — MR AVS SNAPSHOT
After Visit Summary   9/13/2018    Todd S Aschoff    MRN: [id number]           Patient Information     Date Of Birth          1955        Visit Information        Provider Department      9/13/2018 11:30 AM Brook Navarrete APRN CNP [facility]        Today's Diagnoses     Failed back syndrome    -  1    Chronic pain syndrome          Care Instructions     1.  Pain Physical Therapy:  YES   Continue to see TIFFANY Russell as recommended.    2.  Pain Psychologist to address relaxation, behavioral change, coping style, and other factors important to improvement.  YES  Would highly recommend. Will likely order at next visit.    3.  Medication Management:     1. Continue current medication regimen. Add in the morning dose of Lyrica when ready to do so, monitor how this impacts your pain.     4.  Potential procedures: could consider in the future.     5.  Follow up with AMARIS Kirkland CNP in 6 weeks.       ----------------------------------------------------------------  Clinic Number:  [phone]   Call this number with any questions about your care and for scheduling assistance. Calls are returned Monday through Friday between 8 AM and 4:30 PM. We usually get back to you within 2 business days depending on the issue/request.       Medication refills:    For non-narcotic medications, call your pharmacy directly to request a refill. The pharmacy will contact the [facility] for authorization. Please allow 3-4 days for these refills to be processed.     For narcotic refills, call the nurse triage line or send a [employer] message. Please contact us 7-10 days before your refill is due. The message MUST include the name of the specific medication(s) requested and how you would like to receive the prescription(s). The options are as follows:    Pain Clinic staff can mail the prescription to your pharmacy. Please tell us the name of the pharmacy.    You may pick the  prescription up at the Pain Clinic (tell us the location) or during a clinic visit with your pain provider    Pain Clinic staff can deliver the prescription to the [place] pharmacy in the clinic building. Please tell us the location.      We believe regular attendance is key to your success in our program.    Any time you are unable to keep your appointment we ask that you call us at least 24 hours in advance to let us know. This will allow us to offer the appointment time to another patient.               Follow-ups after your visit        Your next 10 appointments already scheduled     Sep 17, 2018 11:45 AM CDT   Anticoagulation Visit with [facility]   [facility]    [address]  [facility] [id number] [phone]            Sep 24, 2018 11:45 AM CDT   Return Visit with Yvonne Mancia PT   [facility] ([facility])    [address]  [address]  [facility] [address]   [phone]              Who to contact     If you have questions or need follow up information about today's clinic visit or your schedule please contact [place] [facility] directly at [phone].  Normal or non-critical lab and imaging results will be communicated to you by MyChart, letter or phone within 4 business days after the clinic has received the results. If you do not hear from us within 7 days, please contact the clinic through [employer] or phone. If you have a critical or abnormal lab result, we will notify you by phone as soon as possible.  Submit refill requests through [employer] or call your pharmacy and they will forward the refill request to us. Please allow 3 business days for your refill to be completed.          Additional Information About Your Visit        [employer] Information     [employer] gives you secure access to your electronic health record. If you see a primary care provider, you can also  send messages to your care team and make appointments. If you have questions, please call your primary care clinic.  If you do not have a primary care provider, please call [phone] and they will assist you.        Care EveryWhere ID     This is your Care EveryWhere ID. This could be used by other organizations to access your [place] medical records  [id number]        Your Vitals Were     Pulse Pulse Oximetry                76 96%           Blood Pressure from Last 3 Encounters:   09/13/18 110/74   08/15/18 122/82   08/02/18 130/80    Weight from Last 3 Encounters:   08/15/18 143.3 kg (316 lb)   08/02/18 143.3 kg (316 lb)   06/14/18 136.1 kg (300 lb)              Today, you had the following     No orders found for display       Primary Care Provider Office Phone # Fax #    Obdulio Ingram [phone]       [address] [facility] [address]  [facility] [address]        Equal Access to Services     [facility]: Hadii aad ku hadasho Soomaali, waaxda luqadaha, qaybta kaalmada adeegyada, waxay idiin hayaan moriaheg luisanaaradarian carmichael'roman . So [facility] [phone].    ATENCIÓN: Si habla español, tiene a thomas disposición servicios gratuitos de asistencia lingüística. Llame al [phone].    We comply with applicable federal civil rights laws and [place] laws. We do not discriminate on the basis of race, color, national origin, age, disability, sex, sexual orientation, or gender identity.            Thank you!     Thank you for choosing [facility]  for your care. Our goal is always to provide you with excellent care. Hearing back from our patients is one way we can continue to improve our services. Please take a few minutes to complete the written survey that you may receive in the mail after your visit with us. Thank you!             Your Updated Medication List - Protect others around you: Learn how to safely use, store and throw away your medicines at www.disposemymeds.org.          This list is  accurate as of 9/13/18 11:53 AM.  Always use your most recent med list.                   Brand Name Dispense Instructions for use Diagnosis    * acetaminophen 650 MG 8 hour tablet    ACETAMINOPHEN 8 HOUR    250 tablet    Take 650 mg by mouth 2 times daily    Chronic low back pain       * MAPAP ARTHRITIS PAIN 650 MG CR tablet   Generic drug:  acetaminophen     180 tablet    Take 1 tablet (650mg) by mouth 2 times daily.    Chronic low back pain       azelastine 0.1 % nasal spray    ASTELIN    30 mL    USE 2 SPRAYS IN NOSTRIL 2 TIMES DAILY AS NEEDED.    Other chronic rhinitis       cetirizine 10 MG tablet    zyrTEC    90 tablet    TAKE ONE TABLET BY MOUTH ONCE DAILY AS NEEDED    Chronic rhinitis       cyclobenzaprine 10 MG tablet    FLEXERIL    270 tablet    Take 1 tablet (10 mg) by mouth 3 times daily as needed for muscle spasms    Muscle spasm       diazepam 5 MG tablet    VALIUM    270 tablet    TAKE ONE TABLET BY MOUTH EVERY EIGHT HOURS AS NEEDED FOR ANXIETY OR MUSCLE SPASMS    Back muscle spasm       donepezil 10 MG tablet    ARICEPT    90 tablet    Take 1 tablet (10 mg) by mouth At Bedtime    Memory difficulties       enoxaparin 150 MG/ML injection    LOVENOX    10 Syringe    Inject 0.8 mLs (120 mg) Subcutaneous every 12 hours    Aortic valve prosthesis present, Long term current use of anticoagulant therapy       guanFACINE 1 MG tablet    TENEX    90 tablet    Take 1 tablet (1 mg) by mouth At Bedtime    Major depressive disorder, recurrent episode, in full remission (H)       isometheptene-acetaminophen-dichloralphenazone -100 MG per capsule    MIDRIN    30 capsule    Take 1 capsule by mouth 4 times daily as needed    Headache(784.0)       levothyroxine 150 MCG tablet    SYNTHROID/LEVOTHROID    90 tablet    Take 1 tablet (150 mcg) by mouth daily    Acquired hypothyroidism       mirabegron 50 MG 24 hr tablet    MYRBETRIQ    90 tablet    Take 1 tablet (50 mg) by mouth daily    Overactive bladder        nicotine polacrilex 2 MG lozenge    COMMIT    360 lozenge    Place 1 lozenge (2 mg) inside cheek every hour as needed for smoking cessation    Tobacco abuse       pregabalin 100 MG capsule    LYRICA    270 capsule    Take 1 capsule (100 mg) by mouth 3 times daily Do not take if sleepy/sedated.    Chronic low back pain with sciatica, sciatica laterality unspecified, unspecified back pain laterality       propafenone 150 MG Tabs tablet    RYTHMOL    270 tablet    Take 1 tablet (150 mg) by mouth every 8 hours    Chronic atrial fibrillation (H)       senna-docusate 8.6-50 MG per tablet    SENOKOT-S;PERICOLACE    60 tablet    Take 1 tablet by mouth 2 times daily as needed for constipation    Constipation, unspecified constipation type       simvastatin 40 MG tablet    ZOCOR    90 tablet    Take 1 tablet (40 mg) by mouth At Bedtime    Hyperlipidemia LDL goal <130       traZODone 50 MG tablet    DESYREL    90 tablet    Take 1 tablet (50 mg) by mouth At Bedtime    Primary insomnia       venlafaxine 150 MG Tb24 24 hr tablet    EFFEXOR-ER    90 each    Take 1 tablet (150 mg) by mouth daily (with breakfast)    Major depressive disorder, recurrent episode, in full remission (H)       warfarin 5 MG tablet    COUMADIN    100 tablet    Take 1 1/2 tablets (7.5 mg) by mouth Mon and Fri. Take 1 tablet (5 mg) all other days or as instructed by INR Clinic.    Current use of long term anticoagulation       * Notice:  This list has 2 medication(s) that are the same as other medications prescribed for you. Read the directions carefully, and ask your doctor or other care provider to review them with you.

## 2018-09-18 ENCOUNTER — ANTICOAGULATION THERAPY VISIT (OUTPATIENT)
Dept: ANTICOAGULATION | Facility: CLINIC | Age: 63
End: 2018-09-18
Payer: COMMERCIAL

## 2018-09-18 DIAGNOSIS — Z79.01 LONG-TERM (CURRENT) USE OF ANTICOAGULANTS: ICD-10-CM

## 2018-09-18 DIAGNOSIS — Z95.2 AORTIC VALVE PROSTHESIS PRESENT: ICD-10-CM

## 2018-09-18 LAB — INR POINT OF CARE: 3.9 (ref 0.86–1.14)

## 2018-09-18 PROCEDURE — 85610 PROTHROMBIN TIME: CPT | Mod: QW

## 2018-09-18 PROCEDURE — 99207 ZZC NO CHARGE NURSE ONLY: CPT

## 2018-09-18 PROCEDURE — 36416 COLLJ CAPILLARY BLOOD SPEC: CPT

## 2018-09-18 NOTE — PROGRESS NOTES
ANTICOAGULATION FOLLOW-UP CLINIC VISIT    Patient Name:  Todd S Aschoff  Date:  9/18/2018  Contact Type:  Face to Face    SUBJECTIVE:     Patient Findings     Positives Change in diet/appetite (Pt reports decreased green intake this past week, )           OBJECTIVE    INR Protime   Date Value Ref Range Status   09/18/2018 3.9 (A) 0.86 - 1.14 Final       ASSESSMENT / PLAN  INR assessment SUPRA    Recheck INR In: 2 WEEKS    INR Location Clinic      Anticoagulation Summary as of 9/18/2018     INR goal 2.5-3.5   Today's INR 3.9!   Warfarin maintenance plan 5 mg (5 mg x 1) on Wed, Sat; 7.5 mg (5 mg x 1.5) all other days   Full warfarin instructions 9/18: 5 mg; Otherwise 5 mg on Wed, Sat; 7.5 mg all other days   Weekly warfarin total 47.5 mg   Plan last modified Amanda Wheeler RN (9/5/2018)   Next INR check 10/3/2018   Priority INR   Target end date     Indications   Long-term (current) use of anticoagulants [Z79.01] [Z79.01]  Aortic valve prosthesis present [Z95.2]  Atrial fibrillation (H) [I48.91]         Anticoagulation Episode Summary     INR check location     Preferred lab     Send INR reminders to [facility]    Comments       Anticoagulation Care Providers     Provider Role Specialty Phone number    Obdulio Ingram MD [facility] Internal Medicine [phone]            See the Encounter Report to view Anticoagulation Flowsheet and Dosing Calendar (Go to Encounters tab in chart review, and find the Anticoagulation Therapy Visit)    Dosage adjustment made based on physician directed care plan.    Lucina Andrew RN

## 2018-09-18 NOTE — MR AVS SNAPSHOT
Todd S Aschoff   9/18/2018 4:45 PM   Anticoagulation Therapy Visit    Description:  62 year old male   Provider:  [facility]   Department:  [facility]           INR as of 9/18/2018     Today's INR 3.9!      Anticoagulation Summary as of 9/18/2018     INR goal 2.5-3.5   Today's INR 3.9!   Full warfarin instructions 9/18: 5 mg; Otherwise 5 mg on Wed, Sat; 7.5 mg all other days   Next INR check 10/3/2018    Indications   Long-term (current) use of anticoagulants [Z79.01] [Z79.01]  Aortic valve prosthesis present [Z95.2]  Atrial fibrillation (H) [I48.91]         Your next Anticoagulation Clinic appointment(s)     Oct 03, 2018 11:00 AM CDT   Anticoagulation Visit with [facility]   [facility]    [address] [facility] [address]  [facility] [address] [phone]              Contact Numbers     [facility] Phone Numbers:  Anticoagulation Clinic Appointments : [phone]  Anticoagulation Nurse: [phone]         September 2018 Details    Sun Mon Tue Wed Thu Fri Sat           1                 2               3               4               5               6               7               8                 9               10               11               12               13               14               15                 16               17               18      5 mg   See details      19      5 mg         20      7.5 mg         21      7.5 mg         22      5 mg           23      7.5 mg         24      7.5 mg         25      7.5 mg         26      5 mg         27      7.5 mg         28      7.5 mg         29      5 mg           30      7.5 mg                Date Details   09/18 This INR check               How to take your warfarin dose     To take:  5 mg Take 1 of the 5 mg tablets.    To take:  7.5 mg Take 1.5 of the 5 mg tablets.           October 2018 Details    Sun Mon Tue Wed Thu Fri Sat      1      7.5 mg         2       7.5 mg         3            4               5               6                 7               8               9               10               11               12               13                 14               15               16               17               18               19               20                 21               22               23               24               25               26               27                 28               29               30               31                   Date Details   No additional details    Date of next INR:  10/3/2018         How to take your warfarin dose     To take:  5 mg Take 1 of the 5 mg tablets.    To take:  7.5 mg Take 1.5 of the 5 mg tablets.

## 2018-09-24 ENCOUNTER — OFFICE VISIT (OUTPATIENT)
Dept: PALLIATIVE MEDICINE | Facility: CLINIC | Age: 63
End: 2018-09-24
Payer: COMMERCIAL

## 2018-09-24 DIAGNOSIS — G89.4 CHRONIC PAIN SYNDROME: Primary | ICD-10-CM

## 2018-09-24 DIAGNOSIS — M96.1 FAILED BACK SYNDROME: ICD-10-CM

## 2018-09-24 PROCEDURE — 97530 THERAPEUTIC ACTIVITIES: CPT | Mod: GP | Performed by: PHYSICAL THERAPIST

## 2018-09-24 NOTE — MR AVS SNAPSHOT
After Visit Summary   9/24/2018    Todd S Aschoff    MRN: [id number]           Patient Information     Date Of Birth          1955        Visit Information        Provider Department      9/24/2018 11:45 AM Yvonne Mancia PT [facility]        Today's Diagnoses     Chronic pain syndrome    -  1    Failed back syndrome           Follow-ups after your visit        Your next 10 appointments already scheduled     Oct 03, 2018 11:00 AM CDT   Anticoagulation Visit with [facility]   [facility]    [address]  [facility] [id number] [phone]            Oct 23, 2018 11:00 AM CDT   Return Visit with AMARIS Cedillo CNP   [facility] ([facility])    [address]  [address]  [facility] [address]   [phone]              Who to contact     If you have questions or need follow up information about today's clinic visit or your schedule please contact [place] [facility] directly at [phone].  Normal or non-critical lab and imaging results will be communicated to you by [employer], letter or phone within 4 business days after the clinic has received the results. If you do not hear from us within 7 days, please contact the clinic through [employer] or phone. If you have a critical or abnormal lab result, we will notify you by phone as soon as possible.  Submit refill requests through [employer] or call your pharmacy and they will forward the refill request to us. Please allow 3 business days for your refill to be completed.          Additional Information About Your Visit        [employer] Information     [employer] gives you secure access to your electronic health record. If you see a primary care provider, you can also send messages to your care team and make appointments. If you have questions, please call your primary care clinic.  If you do not have  a primary care provider, please call [phone] and they will assist you.        Care EveryWhere ID     This is your Care EveryWhere ID. This could be used by other organizations to access your [place] medical records  [id number]         Blood Pressure from Last 3 Encounters:   09/13/18 110/74   08/15/18 122/82   08/02/18 130/80    Weight from Last 3 Encounters:   08/15/18 143.3 kg (316 lb)   08/02/18 143.3 kg (316 lb)   06/14/18 136.1 kg (300 lb)              We Performed the Following     THERAPEUTIC ACTIVITIES        Primary Care Provider Office Phone # Fax #    Obdulio Ingram [phone]       [address]  [facility] [address]        Equal Access to Services     [facility] : Hadii jimbo ku hadasho Soomaali, waaxda luqadaha, qaybta kaalmada adeegyada, waxay idiin hayaan margarito molina . So [facility] [phone].    ATENCIÓN: Si habla español, tiene a thomas disposición servicios gratuitos de asistencia lingüística. [facility] [phone].    We comply with applicable federal civil rights laws and [place] laws. We do not discriminate on the basis of race, color, national origin, age, disability, sex, sexual orientation, or gender identity.            Thank you!     Thank you for choosing [facility]  for your care. Our goal is always to provide you with excellent care. Hearing back from our patients is one way we can continue to improve our services. Please take a few minutes to complete the written survey that you may receive in the mail after your visit with us. Thank you!             Your Updated Medication List - Protect others around you: Learn how to safely use, store and throw away your medicines at www.disposemymeds.org.          This list is accurate as of 9/24/18  1:00 PM.  Always use your most recent med list.                   Brand Name Dispense Instructions for use Diagnosis    * acetaminophen 650 MG 8 hour tablet    ACETAMINOPHEN 8 HOUR    250 tablet     Take 650 mg by mouth 2 times daily    Chronic low back pain       * MAPAP ARTHRITIS PAIN 650 MG CR tablet   Generic drug:  acetaminophen     180 tablet    Take 1 tablet (650mg) by mouth 2 times daily.    Chronic low back pain       azelastine 0.1 % nasal spray    ASTELIN    30 mL    USE 2 SPRAYS IN NOSTRIL 2 TIMES DAILY AS NEEDED.    Other chronic rhinitis       cetirizine 10 MG tablet    zyrTEC    90 tablet    TAKE ONE TABLET BY MOUTH ONCE DAILY AS NEEDED    Chronic rhinitis       cyclobenzaprine 10 MG tablet    FLEXERIL    270 tablet    Take 1 tablet (10 mg) by mouth 3 times daily as needed for muscle spasms    Muscle spasm       diazepam 5 MG tablet    VALIUM    270 tablet    TAKE ONE TABLET BY MOUTH EVERY EIGHT HOURS AS NEEDED FOR ANXIETY OR MUSCLE SPASMS    Back muscle spasm       donepezil 10 MG tablet    ARICEPT    90 tablet    Take 1 tablet (10 mg) by mouth At Bedtime    Memory difficulties       enoxaparin 150 MG/ML injection    LOVENOX    10 Syringe    Inject 0.8 mLs (120 mg) Subcutaneous every 12 hours    Aortic valve prosthesis present, Long term current use of anticoagulant therapy       guanFACINE 1 MG tablet    TENEX    90 tablet    Take 1 tablet (1 mg) by mouth At Bedtime    Major depressive disorder, recurrent episode, in full remission (H)       isometheptene-acetaminophen-dichloralphenazone -100 MG per capsule    MIDRIN    30 capsule    Take 1 capsule by mouth 4 times daily as needed    Headache(784.0)       levothyroxine 150 MCG tablet    SYNTHROID/LEVOTHROID    90 tablet    Take 1 tablet (150 mcg) by mouth daily    Acquired hypothyroidism       mirabegron 50 MG 24 hr tablet    MYRBETRIQ    90 tablet    Take 1 tablet (50 mg) by mouth daily    Overactive bladder       nicotine polacrilex 2 MG lozenge    COMMIT    360 lozenge    Place 1 lozenge (2 mg) inside cheek every hour as needed for smoking cessation    Tobacco abuse       pregabalin 100 MG capsule    LYRICA    270 capsule    Take 1  capsule (100 mg) by mouth 3 times daily Do not take if sleepy/sedated.    Chronic low back pain with sciatica, sciatica laterality unspecified, unspecified back pain laterality       propafenone 150 MG Tabs tablet    RYTHMOL    270 tablet    Take 1 tablet (150 mg) by mouth every 8 hours    Chronic atrial fibrillation (H)       senna-docusate 8.6-50 MG per tablet    SENOKOT-S;PERICOLACE    60 tablet    Take 1 tablet by mouth 2 times daily as needed for constipation    Constipation, unspecified constipation type       simvastatin 40 MG tablet    ZOCOR    90 tablet    Take 1 tablet (40 mg) by mouth At Bedtime    Hyperlipidemia LDL goal <130       traZODone 50 MG tablet    DESYREL    90 tablet    Take 1 tablet (50 mg) by mouth At Bedtime    Primary insomnia       venlafaxine 150 MG Tb24 24 hr tablet    EFFEXOR-ER    90 each    Take 1 tablet (150 mg) by mouth daily (with breakfast)    Major depressive disorder, recurrent episode, in full remission (H)       warfarin 5 MG tablet    COUMADIN    100 tablet    Take 1 1/2 tablets (7.5 mg) by mouth Mon and Fri. Take 1 tablet (5 mg) all other days or as instructed by INR Clinic.    Current use of long term anticoagulation       * Notice:  This list has 2 medication(s) that are the same as other medications prescribed for you. Read the directions carefully, and ask your doctor or other care provider to review them with you.

## 2018-09-24 NOTE — PROGRESS NOTES
PAIN PHYSICAL THERAPY PROGRESS NOTE  Patient Name: Todd S Aschoff      YOB: 1955     Medical Record Number: [id number]  Diagnosis:    Chronic pain syndrome  Failed back syndrome    Visit: 4/10    Subjective: Patient reports left hip starting 4 days ago.  Rested yesterday and added piriformis stretch which seemed to help.  Planning to join a fitness center near his home and begin with biking and light upper body weights.    Self Care  HEP: indep  Walking/Aerobic Activity: issued handout  Breathing/Relaxation: issued CD  Mini Breaks: issued handout  Pacing: issued handout      Objective Findings:  OBSERVATION: Pt brings in  Nursal mini massager and would like to learn how to use for back and leg pain.   Instructed patient in use of mini masseger unit. Discussed indications and contraindications of its used.  Instructed patient in electrode placement in linear and dexter-cross patterns; use of 2 vs 4 electrodes, depending on size of area to cover.  Patient tried the different modes and was able to practice changing intensity up and down, how to attach leads/electrodes; and care of the unit.  Reviewed portions of handbook and supplies kit.  Patient with no further questions at this time.   Added instruction in seated piriformis stretch and discussed gradual progression of exercise tolerance at fitness center.    Treatment Interventions:  Therapeutic Activity:   For 45 minutes including self cares as above.  __________________________________________________________________  Instruction on home program: hip abduction, heel slides, SLR, calf, balance, toe raises    Assessment:  Ongoing Functional Limitations Include:  Patient tolerated/responded well to treatment    Intensity Level: 3 (1=low intensity; 5=high intensity)  Demonstrates/Verbalizes Technique: 5 (1= poor technique-difficulty performing exercises,significant cues required; 5= good technique-performs exercises without cues)  Body Awareness: 3  (1=low awareness; 5=high awareness)  Posture/Stability: 4 (1= poor posture, stability; 5= good posture, stability)  Motivational Level: Ask questions, Eager to learn and Cooperative  Response to Teaching: cooperative  Factors that affect learning: None    _______________________________________________________________________  Plan of Care  Continue PT to support reactivation and integration of self regulation pain management skills;  Continue with prescribed plan of care - progress as tolerated.  Focus next session will be on: Pt would like to continue on his own with self cares and gentle HEP/cardio at fitness center.  Consider to add clamshells, SL LE dural glides, differentiated ankle foot or EV strengthening on left if patient would like to follow up at later date.  No further appointments scheduled at this time. Recheck as needed.     Present:  OSCAR     Total Visit Time:  45 minutes    Therapist: Yvonne Mancia PT             Date: 9/24/2018

## 2018-10-03 ENCOUNTER — ANTICOAGULATION THERAPY VISIT (OUTPATIENT)
Dept: ANTICOAGULATION | Facility: CLINIC | Age: 63
End: 2018-10-03
Payer: COMMERCIAL

## 2018-10-03 DIAGNOSIS — I48.91 ATRIAL FIBRILLATION (H): ICD-10-CM

## 2018-10-03 DIAGNOSIS — Z95.2 AORTIC VALVE PROSTHESIS PRESENT: ICD-10-CM

## 2018-10-03 LAB — INR POINT OF CARE: 3.8 (ref 0.86–1.14)

## 2018-10-03 PROCEDURE — 36416 COLLJ CAPILLARY BLOOD SPEC: CPT

## 2018-10-03 PROCEDURE — 99207 ZZC NO CHARGE NURSE ONLY: CPT

## 2018-10-03 PROCEDURE — 85610 PROTHROMBIN TIME: CPT | Mod: QW

## 2018-10-03 NOTE — MR AVS SNAPSHOT
Todd S Aschoff   10/3/2018 11:00 AM   Anticoagulation Therapy Visit    Description:  62 year old male   Provider:  [facility]   Department:  [facility]           INR as of 10/3/2018     Today's INR 3.8!      Anticoagulation Summary as of 10/3/2018     INR goal 2.5-3.5   Today's INR 3.8!   Full warfarin instructions 10/8: 5 mg; 10/15: 5 mg; Otherwise 5 mg on Wed, Sat; 7.5 mg all other days   Next INR check 10/17/2018    Indications   Long-term (current) use of anticoagulants [Z79.01] [Z79.01]  Aortic valve prosthesis present [Z95.2]  Atrial fibrillation (H) [I48.91]         Your next Anticoagulation Clinic appointment(s)     Oct 17, 2018 11:15 AM CDT   [facility] Visit with [facility]   [facility]    [address] [facility] [address]  [facility] [id number] [phone]              Contact Numbers     [facility] Phone Numbers:  Anticoagulation Clinic Appointments : [phone]  Anticoagulation Nurse: [phone]         October 2018 Details    Sun Mon Tue Wed Thu Fri Sat      1               2               3      5 mg   See details      4      7.5 mg         5      7.5 mg         6      5 mg           7      7.5 mg         8      5 mg         9      7.5 mg         10      5 mg         11      7.5 mg         12      7.5 mg         13      5 mg           14      7.5 mg         15      5 mg         16      7.5 mg         17            18               19               20                 21               22               23               24               25               26               27                 28               29               30               31                   Date Details   10/03 This INR check       Date of next INR:  10/17/2018         How to take your warfarin dose     To take:  5 mg Take 1 of the 5 mg tablets.    To take:  7.5 mg Take 1.5 of the 5 mg tablets.

## 2018-10-03 NOTE — PROGRESS NOTES
ANTICOAGULATION FOLLOW-UP CLINIC VISIT    Patient Name:  Todd S Aschoff  Date:  10/3/2018  Contact Type:  Face to Face    SUBJECTIVE:     Patient Findings     Positives Unexplained INR or factor level change    Comments Patient denies any changes in diet or medications since last INR visit.  Denies any bleeding or bruising problems.  INR has been consistently elevated.  Will lower warfarin maintenance dose today.             OBJECTIVE    INR Protime   Date Value Ref Range Status   10/03/2018 3.8 (A) 0.86 - 1.14 Final       ASSESSMENT / PLAN  INR assessment SUPRA    Recheck INR In: 2 WEEKS    INR Location Clinic      Anticoagulation Summary as of 10/3/2018     INR goal 2.5-3.5   Today's INR 3.8!   Warfarin maintenance plan 5 mg (5 mg x 1) on Wed, Sat; 7.5 mg (5 mg x 1.5) all other days   Full warfarin instructions 10/8: 5 mg; 10/15: 5 mg; Otherwise 5 mg on Wed, Sat; 7.5 mg all other days   Weekly warfarin total 47.5 mg   Plan last modified Amanda Wheeler RN (9/5/2018)   Next INR check 10/17/2018   Priority INR   Target end date     Indications   Long-term (current) use of anticoagulants [Z79.01] [Z79.01]  Aortic valve prosthesis present [Z95.2]  Atrial fibrillation (H) [I48.91]         Anticoagulation Episode Summary     INR check location     Preferred lab     Send INR reminders to [facility]    Comments       Anticoagulation Care Providers     Provider Role Specialty Phone number    Obdulio Ingram MD Responsible Internal Medicine [phone]            See the Encounter Report to view Anticoagulation Flowsheet and Dosing Calendar (Go to Encounters tab in chart review, and find the Anticoagulation Therapy Visit)    Dosage adjustment made based on physician directed care plan.    Amanda Wheeler RN

## 2018-10-17 ENCOUNTER — ANTICOAGULATION THERAPY VISIT (OUTPATIENT)
Dept: ANTICOAGULATION | Facility: CLINIC | Age: 63
End: 2018-10-17
Payer: COMMERCIAL

## 2018-10-17 DIAGNOSIS — Z23 NEED FOR PROPHYLACTIC VACCINATION AND INOCULATION AGAINST INFLUENZA: Primary | ICD-10-CM

## 2018-10-17 DIAGNOSIS — I48.91 ATRIAL FIBRILLATION (H): ICD-10-CM

## 2018-10-17 DIAGNOSIS — Z95.2 AORTIC VALVE PROSTHESIS PRESENT: ICD-10-CM

## 2018-10-17 LAB — INR POINT OF CARE: 3.7 (ref 0.86–1.14)

## 2018-10-17 PROCEDURE — 85610 PROTHROMBIN TIME: CPT | Mod: QW

## 2018-10-17 PROCEDURE — 90682 RIV4 VACC RECOMBINANT DNA IM: CPT

## 2018-10-17 PROCEDURE — 36416 COLLJ CAPILLARY BLOOD SPEC: CPT

## 2018-10-17 PROCEDURE — 99207 ZZC NO CHARGE NURSE ONLY: CPT

## 2018-10-17 PROCEDURE — 90471 IMMUNIZATION ADMIN: CPT

## 2018-10-17 NOTE — PROGRESS NOTES
ANTICOAGULATION FOLLOW-UP CLINIC VISIT    Patient Name:  Todd S Aschoff  Date:  10/17/2018  Contact Type:  Face to Face    SUBJECTIVE:     Patient Findings     Positives No Problem Findings    Comments Flu shot given today.             OBJECTIVE    INR Protime   Date Value Ref Range Status   10/17/2018 3.7 (A) 0.86 - 1.14 Final       ASSESSMENT / PLAN  INR assessment SUPRA    Recheck INR In: 2 WEEKS    INR Location Clinic      Anticoagulation Summary as of 10/17/2018     INR goal 2.5-3.5   Today's INR 3.7!   Warfarin maintenance plan 5 mg (5 mg x 1) on Mon, Wed, Sat; 7.5 mg (5 mg x 1.5) all other days   Full warfarin instructions 10/18: 5 mg; Otherwise 5 mg on Mon, Wed, Sat; 7.5 mg all other days   Weekly warfarin total 45 mg   Plan last modified Amanda Wheeler RN (10/17/2018)   Next INR check 10/31/2018   Priority INR   Target end date     Indications   Long-term (current) use of anticoagulants [Z79.01] [Z79.01]  Aortic valve prosthesis present [Z95.2]  Atrial fibrillation (H) [I48.91]         Anticoagulation Episode Summary     INR check location     Preferred lab     Send INR reminders to [facility]    Comments       Anticoagulation Care Providers     Provider Role Specialty Phone number    Obdulio Ingram MD Responsible Internal Medicine [phone]            See the Encounter Report to view Anticoagulation Flowsheet and Dosing Calendar (Go to Encounters tab in chart review, and find the Anticoagulation Therapy Visit)    Dosage adjustment made based on physician directed care plan.    Amanda Wheeler, RN                 Injectable Influenza Immunization Documentation    1.  Is the person to be vaccinated sick today?   No    2. Does the person to be vaccinated have an allergy to a component   of the vaccine?   No  Egg Allergy Algorithm Link    3. Has the person to be vaccinated ever had a serious reaction   to influenza vaccine in the past?   No    4. Has the person to be vaccinated ever had Guillain-Barré  syndrome?   No    Form completed by Amanda Wheeler RN

## 2018-10-17 NOTE — MR AVS SNAPSHOT
Todd S Aschoff   10/17/2018 11:15 AM   Anticoagulation Therapy Visit    Description:  62 year old male   Provider[facility]  [facility]   Department:  [facility]           INR as of 10/17/2018     Today's INR 3.7!      Anticoagulation Summary as of 10/17/2018     INR goal 2.5-3.5   Today's INR 3.7!   Full warfarin instructions 10/18: 5 mg; Otherwise 5 mg on Mon, Wed, Sat; 7.5 mg all other days   Next INR check 10/31/2018    Indications   Long-term (current) use of anticoagulants [Z79.01] [Z79.01]  Aortic valve prosthesis present [Z95.2]  Atrial fibrillation (H) [I48.91]         Your next Anticoagulation Clinic appointment(s)     Oct 31, 2018 10:00 AM CDT   [facility] with [facility]   [facility]    [address] [facility] [address]  [facility] [address] [phone]              Contact Numbers     [facility] Phone Numbers:  Anticoagulation Clinic Appointments : [phone]  Anticoagulation Nurse: [phone]         October 2018 Details    Sun Mon Tue Wed Thu Fri Sat      1               2               3               4               5               6                 7               8               9               10               11               12               13                 14               15               16               17      5 mg   See details      18      5 mg         19      7.5 mg         20      5 mg           21      7.5 mg         22      5 mg         23      7.5 mg         24      5 mg         25      7.5 mg         26      7.5 mg         27      5 mg           28      7.5 mg         29      5 mg         30      7.5 mg         31                Date Details   10/17 This INR check       Date of next INR:  10/31/2018         How to take your warfarin dose     To take:  5 mg Take 1 of the 5 mg tablets.    To take:  7.5 mg Take 1.5 of the 5 mg tablets.

## 2018-10-22 ENCOUNTER — ALLIED HEALTH/NURSE VISIT (OUTPATIENT)
Dept: NURSING | Facility: CLINIC | Age: 63
End: 2018-10-22
Payer: COMMERCIAL

## 2018-10-22 DIAGNOSIS — Z23 NEED FOR VACCINATION: Primary | ICD-10-CM

## 2018-10-22 PROCEDURE — 90750 HZV VACC RECOMBINANT IM: CPT

## 2018-10-22 PROCEDURE — 90670 PCV13 VACCINE IM: CPT

## 2018-10-22 PROCEDURE — 90471 IMMUNIZATION ADMIN: CPT

## 2018-10-22 PROCEDURE — 90472 IMMUNIZATION ADMIN EACH ADD: CPT

## 2018-10-22 NOTE — PROGRESS NOTES
"[facility]    Date of visit: 10/23/2018    Chief complaint:   Chief Complaint   Patient presents with     Pain       Interval history:  Todd S Aschoff is a 62 year old male last seen by me on 9/13/18.      Recommendations/plan at the last visit included:   1.  Pain Physical Therapy:  YES   Continue with remaining sessions with TIFFANY Russell as recommended. Continue working on walking regimen. Start using TENS unit as well.    2.  Pain Psychologist to address relaxation, behavioral change, coping style, and other factors important to improvement.  YES  Would highly recommend, Nicko is interested in. Will likely order at next visit or sooner if pain psychologist available sooner.    3.  Medication Management:     1. Continue current medication regimen. I recommended Nicko add in the morning dose of Lyrica when ready to do so and monitor how this impacts  pain.     4.  Potential procedures: could consider in the future if needed.      5.  Follow up with AMARIS Kirkland CNP in 6 weeks.       Since his last visit, Todd S Aschoff reports:  -His pain is much better than as it was at last visit.   -He attributes this to be due to the exercises he is continuing to practice on a daily basis. He also attributes some progress to be due to using his TENS unit.   -He uses his TENS unit on his right leg about 3 days a week for 40 minutes. He has some relief with this and thinks it is helpful for nerve damage.   -He has had x1 additional visit with TIFFANY Russell. She comments that he has completed pain physical therapy and no more appointments are needed.   -He plans to join a health club later on this month. States he would like to bike rather than walk, this is easier for him.   -He has been taking lyrica three times daily, thinks that this has been most helpful for \"the nerve part\" of his leg.    -He continues to get good pain relief from epidural steroid injection in August.   -He has been having more right " "knee pain lately, worse for the last 8 months or so. He has had steroid injections with [facility] in his knee with benefit, last about 2 years ago.     Pain Information:   Pain quality: \"no pain\"   Pain timing: Intermittent     Pain rating: intensity ranges from 0/10 to 5/10, and averages 1/10 on a 0-10 scale.   Aggravating factors include: walking   Relieving factors include: rest, stretching, TENS unit    Current pain treatments:    cyclobenzaprine 10mg TID prn- [facility], takes BID to TID depending if exercising   Valium 5mg BID- [facility], SE, sedating, down to taking just at bedtime    Lyrica 100mg TID- [facility] taking three times daily   Effexor 150mg- H for mood taking 450mg daily as per psychiatrist direction to increase    Tylenol 650mg- [facility], BID      Trazodone 50mg at bedtime- [facility] for sleep  Current MME: 0    Annual Controlled Substance Agreement/UDS due date: N/A    Past pain treatments:  1. Previous Pain Relevant Medications:  NOTE: This medication information taken from patient's intake form, not medical records.                         Opiates: oxycodone- H                        NSAIDS: cannot take due to artificial heart valve/Coumadin                        Muscle Relaxants: Flexeril- H                        Anti-migraine mediations: no                        Anti-depressants: Effexor- H                        Sleep aids: trazodone- H                        Anxiolytics: no                        Neuropathics: gabapentin- SE, \"went nuts,\" felt suicidal, Lyrica- [facility]                                    Topicals: liquid heat- [facility]                        Other medications not covered above: Tylenol- [facility]     2. Physical Therapy: yes nursing home only- H, none sense  3. Pain Psychology: no  4. Surgery: anterior cervical decompression and fusion C4-6 3/23/12 Dr. Hayden, decompression L3-4 bilateral 4/3/13 Dr. Hayden   5. Injections: epidural steroid injection 8/14/18 [facility] Dr. [place]- H for leg pain  x2 " epidural steroid injections, [facility]- H 3 days,   6. Chiropractic: yes- x1 session, didn't like  7. Acupuncture: no  8. TENS Unit: no    Medications:  Current Outpatient Prescriptions   Medication Sig Dispense Refill     Acetaminophen (ACETAMINOPHEN 8 HOUR) 650 MG TABS Take 650 mg by mouth 2 times daily 250 tablet 3     azelastine (ASTELIN) 0.1 % spray USE 2 SPRAYS IN NOSTRIL 2 TIMES DAILY AS NEEDED. 30 mL 11     cetirizine (ZYRTEC) 10 MG tablet TAKE ONE TABLET BY MOUTH ONCE DAILY AS NEEDED  90 tablet 0     cyclobenzaprine (FLEXERIL) 10 MG tablet Take 1 tablet (10 mg) by mouth 3 times daily as needed for muscle spasms 270 tablet 3     diazepam (VALIUM) 5 MG tablet TAKE ONE TABLET BY MOUTH EVERY EIGHT HOURS AS NEEDED FOR ANXIETY OR MUSCLE SPASMS 270 tablet 1     donepezil (ARICEPT) 10 MG tablet Take 1 tablet (10 mg) by mouth At Bedtime 90 tablet 3     enoxaparin (LOVENOX) 150 MG/ML injection Inject 0.8 mLs (120 mg) Subcutaneous every 12 hours 10 Syringe 1     guanFACINE (TENEX) 1 MG tablet Take 1 tablet (1 mg) by mouth At Bedtime 90 tablet 3     isometheptene-acetaminophen-dichloralphenazone (MIDRIN) -100 MG per capsule Take 1 capsule by mouth 4 times daily as needed 30 capsule 0     levothyroxine (SYNTHROID/LEVOTHROID) 150 MCG tablet Take 1 tablet (150 mcg) by mouth daily 90 tablet 3     MAPAP ARTHRITIS PAIN 650 MG CR tablet Take 1 tablet (650mg) by mouth 2 times daily.  180 tablet 1     mirabegron (MYRBETRIQ) 50 MG 24 hr tablet Take 1 tablet (50 mg) by mouth daily 90 tablet 3     nicotine polacrilex (COMMIT) 2 MG lozenge Place 1 lozenge (2 mg) inside cheek every hour as needed for smoking cessation 360 lozenge 1     pregabalin (LYRICA) 100 MG capsule Take 1 capsule (100 mg) by mouth 3 times daily Do not take if sleepy/sedated. 270 capsule 3     propafenone (RYTHMOL) 150 MG TABS tablet Take 1 tablet (150 mg) by mouth every 8 hours 270 tablet 3     senna-docusate (SENOKOT-S;PERICOLACE) 8.6-50 MG per  "tablet Take 1 tablet by mouth 2 times daily as needed for constipation 60 tablet 11     simvastatin (ZOCOR) 40 MG tablet Take 1 tablet (40 mg) by mouth At Bedtime 90 tablet 3     traZODone (DESYREL) 50 MG tablet Take 1 tablet (50 mg) by mouth At Bedtime 90 tablet 3     venlafaxine (EFFEXOR-ER) 150 MG TB24 24 hr tablet Take 1 tablet (150 mg) by mouth daily (with breakfast) 90 each 3     warfarin (COUMADIN) 5 MG tablet Take 1 1/2 tablets (7.5 mg) by mouth Mon and Fri. Take 1 tablet (5 mg) all other days or as instructed by INR Clinic. 100 tablet 1       Medical History: any changes in medical history since they were last seen? No    Review of Systems:  The 14 system ROS was reviewed from the intake questionnaire, and is positive for: hearing loss, ringing in ears, allergies, easy bruising, thyroid disease, back pain, incontinence (ongoing), numbness and tingling, depression  Any bowel or bladder problems: denies  Mood: \"pretty good\"     Physical Exam:  Blood pressure 138/89, pulse 79, SpO2 96 %.  General: A&O x4, no signs of distress. Obese.   Gait: Normal.  MSK exam: 5/5 upper and lower extremity strength. Decreased ROM of right knee. No crepitus. Negative McMurrays.     Imaging:  X-ray of right knee was completed on 10/23/18 and shows:  IMPRESSION: Meniscal chondrocalcinosis. Mild medial compartment joint  space narrowing. Normal lateral and patellofemoral compartment joint  space width. Extensive medial and small vessel arterial calcification  posterior to the knee and into the leg.    MRI of lumbar spine was completed on 4/5/18 (Care Everywhere) and shows:  IMPRESSION:    1.  Laminectomy changes at L3-L4, however there is residual moderate to severe spinal canal narrowing at L3-L4 with prominent lateral recess narrowing, with small superiorly extending left subarticular disc extrusion which causes mass effect upon the exiting left L3 nerve root, with severe proximal left neural foraminal narrowing and moderate " right neural foraminal narrowing.  2.  Other degenerative findings as above.  3.  Fusion across the vertebral bodies at L2-L3.  4.  Question slight nonspecific contrast enhancement along the left L4 nerve root within the left dorsolateral spinal canal at the lower level of L3, possibly related to the prominent spinal canal narrowing at L3-L4.   Result Narrative   INDICATION: eval nerve root impingement, left radiculopathy      TECHNIQUE:  MRI of the lumbar spine with and without contrast, 14 mL  GADOBUTROL 1 MMOL/ML IV SOLN.        COMPARISON:  None.           FINDINGS:  Five lumbar-type vertebral bodies.  The conus medullaris terminates at the level of the T12-L1. Normal cord signal.  Bony fusion across the vertebral bodies at L2-L3. Laminectomy changes at L3-L4. Mild degenerative marrow edema at L3-L4. Fatty degenerative endplate marrow changes at L3-L4 and L5-S1 and L1-L2. Moderate to severe posterior disc height narrowing at L5-S1. Moderate disc height narrowing at L3-L4. Moderate to severe posterior disc height narrowing at L1-L2. Disc desiccation throughout the lumbar spine. Moderate to severe disc height narrowing at T10-T11. Other mild disc height narrowing. Mild rightward convex lower lumbar curvature. Straightening of the lumbar lordosis. Slight grade 1 retrolisthesis at L1-L2, L3-L4, and L5-S1.  Question slight contrast enhancement along the left L4 nerve root within the left dorsolateral spinal canal at the lower level of L3 on series 11 image 23. Postsurgical scarring in the posterior paraspinous soft tissues from L1 L4. Atrophy of the posterior paraspinous musculature most pronounced at L4-L5 and L5-S1. Mild disc osteophyte complex at T10-T11 mildly flattens the ventral thecal sac. Moderate or moderate to severe bilateral neural foraminal narrowing at T10-T11.    Level by level:    T12-L1: Spinal canal and neural foramina are patent. Mild facet arthropathy.     L1-2: Disc osteophyte complex flattens  the ventral thecal sac. Mild facet arthropathy. Mild to moderate spinal canal narrowing. Mildly prominent dorsal epidural fat. Near-complete effacement of the thecal sac. Mild lateral recess narrowing. Mild right neural foraminal narrowing. Left neural foramen is patent.    L2-3: Fusion across the vertebral bodies. Posterior osteophytic ridging flattens the ventral thecal sac. Mild to moderate facet arthropathy. Moderate spinal canal narrowing. Mild lateral recess narrowing. Mild to moderate left and mild right neural foraminal narrowing.     L3-4: Laminectomy changes. Slight grade 1 retrolisthesis. Disc osteophyte complex flattens the ventral thecal sac. Mild to moderate facet arthropathy. Moderate to severe spinal canal narrowing. Prominent lateral recess narrowing. Superiorly extending left subarticular disc extrusion causes mass effect upon the exiting left L3 nerve root. Possible small synovial cyst extending anterior to the left facet joint. Severe proximal left neural foraminal narrowing. Moderate right neural foraminal narrowing.    L4-5: Minimal disc osteophyte complex flattens the ventral thecal sac. Mild to moderate right and mild left facet arthropathy. Mild-to-moderate spinal canal narrowing. Mild bilateral neural foraminal narrowing.    L5-S1: Mild posterior disc osteophyte complex mildly flattens the ventral thecal sac and mildly contacts the descending S1 nerve roots. Mild to moderate facet arthropathy. Mild spinal canal narrowing. Mild bilateral neural foraminal narrowing.     Assessment:   Todd S Aschoff is a 62 year old male with a past medical history significant for Tourette syndrome, alcohol dependence in remission, obesity, hypothyroidism, hyperlipidemia, atrial fibrillation, BPH, and depression who presents with complaints of low back pain .      1. Low back pain- etiology likely multilevel degenerative disc disease, apparent dural tear, and extruded disc herniation L3-4, s/p L3-4 laminectomy  2013 Dr. Hayden.   2. Mental Health - the patient's mental health concerns, specifically depression, affect his experience of pain and contribute to his clinically significant distress.      1. Lumbar radiculopathy    2. Chronic pain of right knee        Plan:     1.  Continue exercise and stretching regimen. Get started at the local gym when ready to do so.    2.  Pain Psychologist to address relaxation, behavioral change, coping style, and other factors important to improvement.  YES  Nicko is interested in pain psychology. We will call him when Renita- our pain psychologist is available. I recommended he try to have a few sessions prior to follow up visit with Dr. Perez.    3.  Diagnostic Studies: Nicko reports hx of right knee bursitis. He has had a steroid injectio with good benefit. Records with [facility]. X-ray of right knee ordered today for further evaluation. Shows mild degeneration.    4.  Medication Management:     1. Continue current medication regimen. No changes recommended at this time.    5.  Potential procedures: can repeat epidural steroid injection as needed in the future. Would be due as soon as November for this ([facility] for [facility]- where previous injection was completed-submitted today. When ready, he would like to repeat the injection in August as previous epidural steroid injection there was not helpful). He is interested in repeating right knee steroid injection. Previous injection at [facility] was helpful for 1-2 years. Ordered today. If this is not helpful, would recommend ortho referral.    6.  We discussed transitioning care back to primary care provider vs. having infrequent visits with pain clinic. As he is interested in pain psychology and injections, he would prefer to have less frequent visits for now. He will follow up with Dr. Perez end of December/early January and me when I return from maternity leave.     I spent 30 minutes of time face to face with the patient.   Greater than 50% of this time was spent in patient counseling and/or coordination of care.    Patti GLEZ [facility] [place]

## 2018-10-23 ENCOUNTER — TELEPHONE (OUTPATIENT)
Dept: PALLIATIVE MEDICINE | Facility: CLINIC | Age: 63
End: 2018-10-23

## 2018-10-23 ENCOUNTER — RADIANT APPOINTMENT (OUTPATIENT)
Dept: GENERAL RADIOLOGY | Facility: CLINIC | Age: 63
End: 2018-10-23
Attending: NURSE PRACTITIONER
Payer: COMMERCIAL

## 2018-10-23 ENCOUNTER — OFFICE VISIT (OUTPATIENT)
Dept: PALLIATIVE MEDICINE | Facility: CLINIC | Age: 63
End: 2018-10-23
Payer: COMMERCIAL

## 2018-10-23 VITALS — SYSTOLIC BLOOD PRESSURE: 138 MMHG | OXYGEN SATURATION: 96 % | DIASTOLIC BLOOD PRESSURE: 89 MMHG | HEART RATE: 79 BPM

## 2018-10-23 DIAGNOSIS — M96.1 FAILED BACK SYNDROME: Primary | ICD-10-CM

## 2018-10-23 DIAGNOSIS — M25.561 CHRONIC PAIN OF RIGHT KNEE: ICD-10-CM

## 2018-10-23 DIAGNOSIS — G89.29 CHRONIC PAIN OF RIGHT KNEE: ICD-10-CM

## 2018-10-23 DIAGNOSIS — M17.11 PRIMARY OSTEOARTHRITIS OF RIGHT KNEE: ICD-10-CM

## 2018-10-23 DIAGNOSIS — G89.4 CHRONIC PAIN SYNDROME: ICD-10-CM

## 2018-10-23 PROCEDURE — 99214 OFFICE O/P EST MOD 30 MIN: CPT | Performed by: NURSE PRACTITIONER

## 2018-10-23 PROCEDURE — 73562 X-RAY EXAM OF KNEE 3: CPT | Mod: RT

## 2018-10-23 ASSESSMENT — PAIN SCALES - GENERAL: PAINLEVEL: NO PAIN (0)

## 2018-10-23 NOTE — PATIENT INSTRUCTIONS
1.  Continue exercise and stretching regimen. Get started at the local gym when ready to do so.    2.  Pain Psychologist to address relaxation, behavioral change, coping style, and other factors important to improvement.  YES  We will call you when Renita- our pain psychologist is available. Try to have a few sessions prior to follow up visit with Dr. Perez.    3.  Diagnostic Studies:  I ordered an x-ray of right knee today. We will get this completed today.    4.  Medication Management:     1. Continue Lyrica, Flexeril, Effexor, and Tylenol.    5.  Potential procedures: can repeat epidural steroid injection as needed in the future. Would be due as soon as November for this. We will call to schedule knee injection.    6.  Follow up with Dr. Perez end of December/early January.       ----------------------------------------------------------------  Clinic Number:  [phone]   Call this number with any questions about your care and for scheduling assistance. Calls are returned Monday through Friday between 8 AM and 4:30 PM. We usually get back to you within 2 business days depending on the issue/request.       Medication refills:    For non-narcotic medications, call your pharmacy directly to request a refill. The pharmacy will contact the [facility] for authorization. Please allow 3-4 days for these refills to be processed.     For narcotic refills, call the clinic number or send a [employer] message. Please contact us 7-10 days before your refill is due. The message MUST include the name of the specific medication(s) requested and how you would like to receive the prescription(s). The options are as follows:    Pain Clinic staff can mail the prescription to your pharmacy. Please tell us the name of the pharmacy.    You may pick the prescription up at the Pain Clinic (tell us the location) or during a clinic visit with your pain provider    Pain Clinic staff can deliver the prescription to the  [place] pharmacy in the clinic building. Please tell us the location.      We believe regular attendance is key to your success in our program.    Any time you are unable to keep your appointment we ask that you call us at least 24 hours in advance to let us know. This will allow us to offer the appointment time to another patient.

## 2018-10-23 NOTE — MR AVS SNAPSHOT
After Visit Summary   10/23/2018    Todd S Aschoff    MRN: [id number]           Patient Information     Date Of Birth          1955        Visit Information        Provider Department      10/23/2018 11:00 AM Brook Navarrete APRN CNP [facility]        Today's Diagnoses     Chronic pain of right knee    -  1      Care Instructions     1.  Continue exercise and stretching regimen. Get started at the local gym when ready to do so.    2.  Pain Psychologist to address relaxation, behavioral change, coping style, and other factors important to improvement.  YES  We will call you when Renita our pain psychologist is available. Try to have a few sessions prior to follow up visit with Dr. Perez.    3.  Diagnostic Studies:  I ordered an x-ray of right knee today. We will get this completed today.    4.  Medication Management:     1. Continue Lyrica, Flexeril, Effexor, and Tylenol.    5.  Potential procedures: can repeat epidural steroid injection as needed in the future. Would be due as soon as November for this. We will call to schedule knee injection.    6.  Follow up with Dr. Perez end of December/early January.       ----------------------------------------------------------------  Clinic Number:  [phone]   Call this number with any questions about your care and for scheduling assistance. Calls are returned Monday through Friday between 8 AM and 4:30 PM. We usually get back to you within 2 business days depending on the issue/request.       Medication refills:    For non-narcotic medications, call your pharmacy directly to request a refill. The pharmacy will contact the [facility] for authorization. Please allow 3-4 days for these refills to be processed.     For narcotic refills, call the clinic number or send a Yola message. Please contact us 7-10 days before your refill is due. The message MUST include the name of the specific medication(s) requested  and how you would like to receive the prescription(s). The options are as follows:    Pain Clinic staff can mail the prescription to your pharmacy. Please tell us the name of the pharmacy.    You may pick the prescription up at the Pain Clinic (tell us the location) or during a clinic visit with your pain provider    Pain Clinic staff can deliver the prescription to the [place] pharmacy in the clinic building. Please tell us the location.      We believe regular attendance is key to your success in our program.    Any time you are unable to keep your appointment we ask that you call us at least 24 hours in advance to let us know. This will allow us to offer the appointment time to another patient.               Follow-ups after your visit        Your next 10 appointments already scheduled     Oct 31, 2018 10:00 AM CDT   Anticoagulation Visit with [facility]   [facility] ([facility]    [address]  [facility] [address]   [phone]              Future tests that were ordered for you today     Open Future Orders        Priority Expected Expires Ordered    XR Knee Right 3 Views Routine 10/23/2018 10/23/2019 10/23/2018            Who to contact     If you have questions or need follow up information about today's clinic visit or your schedule please contact [facility] directly at [phone].  Normal or non-critical lab and imaging results will be communicated to you by MyChart, letter or phone within 4 business days after the clinic has received the results. If you do not hear from us within 7 days, please contact the clinic through MyChart or phone. If you have a critical or abnormal lab result, we will notify you by phone as soon as possible.  Submit refill requests through [employer] or call your pharmacy and they will forward the refill request to us. Please allow 3 business days for your refill to be completed.           Additional Information About Your Visit        Portal Profeshart Information     [employer] gives you secure access to your electronic health record. If you see a primary care provider, you can also send messages to your care team and make appointments. If you have questions, please call your primary care clinic.  If you do not have a primary care provider, please call [phone] and they will assist you.        Care EveryWhere ID     This is your Care EveryWhere ID. This could be used by other organizations to access your [facility] medical records  [id number]        Your Vitals Were     Pulse Pulse Oximetry                79 96%           Blood Pressure from Last 3 Encounters:   10/23/18 138/89   09/13/18 110/74   08/15/18 122/82    Weight from Last 3 Encounters:   08/15/18 143.3 kg (316 lb)   08/02/18 143.3 kg (316 lb)   06/14/18 136.1 kg (300 lb)               Primary Care Provider Office Phone # Fax #    Obdulio Ingram [phone]       [address] [facility] [address]  [facility] [address]        Equal Access to Services     [facility]: Hadii aad ku hadasho Soomaali, waaxda luqadaha, qaybta kaalmada adeegyada, waxay oral hayroman molina . So [facility] [phone].    ATENCIÓN: Si habla español, tiene a thomas disposición servicios gratuitos de asistencia lingüística. [facility] [phone].    We comply with applicable federal civil rights laws and [place] laws. We do not discriminate on the basis of race, color, national origin, age, disability, sex, sexual orientation, or gender identity.            Thank you!     Thank you for choosing [facility]  for your care. Our goal is always to provide you with excellent care. Hearing back from our patients is one way we can continue to improve our services. Please take a few minutes to complete the written survey that you may receive in the mail after your visit with us. Thank you!             Your Updated Medication List - Protect others  around you: Learn how to safely use, store and throw away your medicines at www.disposemymeds.org.          This list is accurate as of 10/23/18 11:21 AM.  Always use your most recent med list.                   Brand Name Dispense Instructions for use Diagnosis    * acetaminophen 650 MG 8 hour tablet    ACETAMINOPHEN 8 HOUR    250 tablet    Take 650 mg by mouth 2 times daily    Chronic low back pain       * MAPAP ARTHRITIS PAIN 650 MG CR tablet   Generic drug:  acetaminophen     180 tablet    Take 1 tablet (650mg) by mouth 2 times daily.    Chronic low back pain       azelastine 0.1 % nasal spray    ASTELIN    30 mL    USE 2 SPRAYS IN NOSTRIL 2 TIMES DAILY AS NEEDED.    Other chronic rhinitis       cetirizine 10 MG tablet    zyrTEC    90 tablet    TAKE ONE TABLET BY MOUTH ONCE DAILY AS NEEDED    Chronic rhinitis       cyclobenzaprine 10 MG tablet    FLEXERIL    270 tablet    Take 1 tablet (10 mg) by mouth 3 times daily as needed for muscle spasms    Muscle spasm       diazepam 5 MG tablet    VALIUM    270 tablet    TAKE ONE TABLET BY MOUTH EVERY EIGHT HOURS AS NEEDED FOR ANXIETY OR MUSCLE SPASMS    Back muscle spasm       donepezil 10 MG tablet    ARICEPT    90 tablet    Take 1 tablet (10 mg) by mouth At Bedtime    Memory difficulties       enoxaparin 150 MG/ML injection    LOVENOX    10 Syringe    Inject 0.8 mLs (120 mg) Subcutaneous every 12 hours    Aortic valve prosthesis present, Long term current use of anticoagulant therapy       guanFACINE 1 MG tablet    TENEX    90 tablet    Take 1 tablet (1 mg) by mouth At Bedtime    Major depressive disorder, recurrent episode, in full remission (H)       isometheptene-acetaminophen-dichloralphenazone -100 MG per capsule    MIDRIN    30 capsule    Take 1 capsule by mouth 4 times daily as needed    Headache(784.0)       levothyroxine 150 MCG tablet    SYNTHROID/LEVOTHROID    90 tablet    Take 1 tablet (150 mcg) by mouth daily    Acquired hypothyroidism        mirabegron 50 MG 24 hr tablet    MYRBETRIQ    90 tablet    Take 1 tablet (50 mg) by mouth daily    Overactive bladder       nicotine polacrilex 2 MG lozenge    COMMIT    360 lozenge    Place 1 lozenge (2 mg) inside cheek every hour as needed for smoking cessation    Tobacco abuse       pregabalin 100 MG capsule    LYRICA    270 capsule    Take 1 capsule (100 mg) by mouth 3 times daily Do not take if sleepy/sedated.    Chronic low back pain with sciatica, sciatica laterality unspecified, unspecified back pain laterality       propafenone 150 MG Tabs tablet    RYTHMOL    270 tablet    Take 1 tablet (150 mg) by mouth every 8 hours    Chronic atrial fibrillation (H)       senna-docusate 8.6-50 MG per tablet    SENOKOT-S;PERICOLACE    60 tablet    Take 1 tablet by mouth 2 times daily as needed for constipation    Constipation, unspecified constipation type       simvastatin 40 MG tablet    ZOCOR    90 tablet    Take 1 tablet (40 mg) by mouth At Bedtime    Hyperlipidemia LDL goal <130       traZODone 50 MG tablet    DESYREL    90 tablet    Take 1 tablet (50 mg) by mouth At Bedtime    Primary insomnia       venlafaxine 150 MG Tb24 24 hr tablet    EFFEXOR-ER    90 each    Take 1 tablet (150 mg) by mouth daily (with breakfast)    Major depressive disorder, recurrent episode, in full remission (H)       warfarin 5 MG tablet    COUMADIN    100 tablet    Take 1 1/2 tablets (7.5 mg) by mouth Mon and Fri. Take 1 tablet (5 mg) all other days or as instructed by INR Clinic.    Current use of long term anticoagulation       * Notice:  This list has 2 medication(s) that are the same as other medications prescribed for you. Read the directions carefully, and ask your doctor or other care provider to review them with you.

## 2018-10-23 NOTE — TELEPHONE ENCOUNTER
LM for patient to schedule Right Knee Injection      Romina Lyon    [facility]

## 2018-10-25 DIAGNOSIS — Z95.2 AORTIC VALVE PROSTHESIS PRESENT: ICD-10-CM

## 2018-10-25 RX ORDER — AMOXICILLIN 500 MG/1
2000 TABLET, FILM COATED ORAL ONCE
Qty: 4 TABLET | Refills: 1 | Status: SHIPPED | OUTPATIENT
Start: 2018-10-25 | End: 2019-04-16

## 2018-10-25 NOTE — TELEPHONE ENCOUNTER
"Requested Prescriptions   Pending Prescriptions Disp Refills     amoxicillin (AMOXIL) 500 MG tablet 4 tablet 3     Sig: Take 4 tablets (2,000 mg) by mouth once for 1 dose One hour prior to dental work    Oral Acne/Rosacea Medications Protocol Failed    10/25/2018  2:16 PM       Failed - Confirmation of diagnosis is required    Please confirm diagnosis is acne or rosacea.     If NOT acne or rosacea; refer request to provider for further evaluation.    If diagnosis IS acne or rosacea, OK to refill BASED ON PREVIOUS REFILL CLINICAL NOTE RECOMMENDATION.         Passed - Patient is 12 years of age or older       Passed - Recent (12 mo) or future (30 days) visit within the authorizing provider's specialty    Patient had office visit in the last 12 months or has a visit in the next 30 days with authorizing provider or within the authorizing provider's specialty.  See \"Patient Info\" tab in inbasket, or \"Choose Columns\" in Meds & Orders section of the refill encounter.              Patient request refill of amoxicillin to use prior to his dental visit.  Last office visit 8/2/18.  Prescription approved per [facility] Refill Protocol.  Amanda Wheeler RN    "

## 2018-10-25 NOTE — TELEPHONE ENCOUNTER
Pre-screening Questions for Shoulder/Knee Injections:      Location:    [place]:     Only schedule on Wednesdays (ultrasound machine NOT available Tuesday and Thursday)    If need to double book, use the 3:30 pm slot     [place]:  Ultrasound appointments NOT available at this time    Does patient have an active infection or treated for one within the past week? No  (If YES, do NOT schedule and route to RN)     Is patient currently taking any antibiotics?  No  (If YES, do NOT schedule and route to RN)  For patients on chronic, preventative or prophylactic antibiotics, procedures can be scheduled.    Marika Niño, Roberta, Jose Luis Tam-antibiotic course must have been completed for 4 days    Is patient taking any aspirin products? No     No need to hold non-aspirin anticoagulants.     If taking > 325mg/day of aspirin, limit aspirin to 81-325mg/day x 1 week.     No hold required day of procedure.

## 2018-10-29 NOTE — MR AVS SNAPSHOT
Todd S Aschoff   5/25/2018 3:15 PM   Anticoagulation Therapy Visit    Description:  62 year old male   Provider[facility]  [facility]   Department:  [facility]           INR as of 5/25/2018     Today's INR 2.7      Anticoagulation Summary as of 5/25/2018     INR goal 2.5-3.5   Today's INR 2.7   Full warfarin instructions 7.5 mg on Mon, Fri; 5 mg all other days   Next INR check 6/15/2018    Indications   Long-term (current) use of anticoagulants [Z79.01] [Z79.01]  Aortic valve prosthesis present [Z95.2]  Atrial fibrillation (H) [I48.91]         Your next Anticoagulation Clinic appointment(s)     Alexey 15, 2018 11:30 AM CDT   Anticoagulation Visit with  ANTICOAGULATION CLINIC   [facility]    [address] [place] [address]  [address]  [facility] [id number] [phone]              Contact Numbers     [facility] Phone Numbers:  Anticoagulation Clinic Appointments : [phone]  Anticoagulation Nurse: [phone]         May 2018 Details    Sun Mon Tue Wed Thu Fri Sat       1               2               3               4               5                 6               7               8               9               10               11               12                 13               14               15               16               17               18               19                 20               21               22               23               24               25      7.5 mg   See details      26      5 mg           27      5 mg         28      7.5 mg         29      5 mg         30      5 mg         31      5 mg            Date Details   05/25 This INR check               How to take your warfarin dose     To take:  5 mg Take 1 of the 5 mg tablets.    To take:  7.5 mg Take 1.5 of the 5 mg tablets.           June 2018 Details    Sun Mon Tue Wed Thu Fri Sat          1      7.5 mg         2      5 mg           3      5 mg         4      7.5 mg        Daily Note     Today's date: 10/29/2018  Patient name: Anne Fishman  : 1940  MRN: [id number]  Referring provider: Kike Jordan MD  Dx:   Encounter Diagnosis     ICD-10-CM    1  SI (sacroiliac) joint dysfunction M53 3                   Subjective: Pt reports that she has a dull headache  States that she lessened her heel lift because she had a contralateral joint ache  Objective: See treatment diary below  Precautions: R hip replacement     Daily Treatment Diary      Manual  10/11  10/15  10/29                 SI PA mobs or IR mobs     NP                   Hip PROM      DADA                 Lumbar PA      DADA                                                                       Exercise Diary  10/11  10/15  10/18  10/22  10/29             Heel raise fitting/education 10'                     Bike    8'  10'  8'  9'             TA contraction    10x3 breaths  dc                 glute sets/bridge    2x10  2x15  3x10  3x10             clamshell    2x10 each  2x10 each  2x10 each  2x10             sit to stands                       Multifidus press      OTB 20 each  OTB 20 each  OTB 20 each              TA with marching      20 each   20 each  20 each              step ups                        lateral step ups      10 each 1x10 and 1x5  2x10 each                                                                                                                                                                                                                                                                   Modalities                                                                                                      Assessment: Pt has some R sided lumbar hypomobility upon palpation today  Was able to perform TE without complaint and was able to increase reps for step ups  Plan: Progress treatment as tolerated    5      5 mg         6      5 mg         7      5 mg         8      7.5 mg         9      5 mg           10      5 mg         11      7.5 mg         12      5 mg         13      5 mg         14      5 mg         15            16                 17               18               19               20               21               22               23                 24               25               26               27               28               29               30                Date Details   No additional details    Date of next INR:  6/15/2018         How to take your warfarin dose     To take:  5 mg Take 1 of the 5 mg tablets.    To take:  7.5 mg Take 1.5 of the 5 mg tablets.

## 2018-11-02 DIAGNOSIS — Z79.01 CURRENT USE OF LONG TERM ANTICOAGULATION: ICD-10-CM

## 2018-11-02 RX ORDER — WARFARIN SODIUM 5 MG/1
TABLET ORAL
Qty: 110 TABLET | Refills: 0 | Status: SHIPPED | OUTPATIENT
Start: 2018-11-02 | End: 2019-01-23

## 2018-11-02 NOTE — TELEPHONE ENCOUNTER
"Requested Prescriptions   Pending Prescriptions Disp Refills     warfarin (COUMADIN) 5 MG tablet  Last Written Prescription Date:  5/15/18  Last Fill Quantity: 100,  # refills: 1   Last office visit: 8/2/2018 with prescribing provider:     Future Office Visit:     100 tablet 1     Sig: Take 1 1/2 tablets (7.5 mg) by mouth Mon and Fri. Take 1 tablet (5 mg) all other days or as instructed by INR Clinic.    Vitamin K Antagonists Failed    11/2/2018 11:49 AM       Failed - INR is within goal in the past 6 weeks    Confirm INR is within goal in the past 6 weeks.     Recent Labs   Lab Test 10/17/18   INR  3.7*                      Passed - Recent (12 mo) or future (30 days) visit within the authorizing provider's specialty    Patient had office visit in the last 12 months or has a visit in the next 30 days with authorizing provider or within the authorizing provider's specialty.  See \"Patient Info\" tab in inbasket, or \"Choose Columns\" in Meds & Orders section of the refill encounter.             Passed - Patient is 18 years of age or older          "

## 2018-11-02 NOTE — TELEPHONE ENCOUNTER
Warfarin dosing is managed by INR Clinic.  Prescription approved per [facility] Refill Protocol.  Amanda Wheeler RN

## 2018-11-04 DIAGNOSIS — Z79.01 CURRENT USE OF LONG TERM ANTICOAGULATION: ICD-10-CM

## 2018-11-05 NOTE — TELEPHONE ENCOUNTER
"Requested Prescriptions   Pending Prescriptions Disp Refills     warfarin (COUMADIN) 5 MG tablet [Pharmacy Med Name: Warfarin Sodium Oral Tablet 5 MG] 100 tablet 0    Last Written Prescription Date:  11/02/2018  Last Fill Quantity: 110,  # refills: 0   Last office visit: 8/2/2018 with prescribing provider:     Future Office Visit:   Sig: Take 1+ 1/2 tablets (7.5 mg) by mouth Mon and Fri. Take 1 tablet (5 mg) all other days or as instructed by INR Clinic.    Vitamin K Antagonists Failed    11/4/2018  7:01 AM       Failed - INR is within goal in the past 6 weeks    Confirm INR is within goal in the past 6 weeks.     Recent Labs   Lab Test 10/17/18   INR  3.7*                      Passed - Recent (12 mo) or future (30 days) visit within the authorizing provider's specialty    Patient had office visit in the last 12 months or has a visit in the next 30 days with authorizing provider or within the authorizing provider's specialty.  See \"Patient Info\" tab in inbasket, or \"Choose Columns\" in Meds & Orders section of the refill encounter.             Passed - Patient is 18 years of age or older        "

## 2018-11-06 DIAGNOSIS — M54.50 CHRONIC LOW BACK PAIN: ICD-10-CM

## 2018-11-06 DIAGNOSIS — G89.29 CHRONIC LOW BACK PAIN: ICD-10-CM

## 2018-11-06 RX ORDER — WARFARIN SODIUM 5 MG/1
TABLET ORAL
Qty: 100 TABLET | Refills: 0 | OUTPATIENT
Start: 2018-11-06

## 2018-11-06 NOTE — TELEPHONE ENCOUNTER
"Requested Prescriptions   Pending Prescriptions Disp Refills     MAPAP ARTHRITIS PAIN 650 MG CR tablet [Pharmacy Med Name: Mapap Arthritis Pain Oral Tablet Extended Release 650 MG]  Last Written Prescription Date:  6/19/18  Last Fill Quantity: 180 TABLET,  # refills: 1   Last office visit: 8/2/2018 with prescribing provider:  VANESSA   Future Office Visit:     180 tablet 0     Sig: TAKE ONE TABLET BY MOUTH TWICE DAILY    Analgesics (Non-Narcotic Tylenol and ASA Only) Passed    11/6/2018 11:14 AM       Passed - Recent (12 mo) or future (30 days) visit within the authorizing provider's specialty    Patient had office visit in the last 12 months or has a visit in the next 30 days with authorizing provider or within the authorizing provider's specialty.  See \"Patient Info\" tab in inbasket, or \"Choose Columns\" in Meds & Orders section of the refill encounter.             Passed - Patient is 7 months old or older    If patient is a peds patient of the age 7 mos -12 years, ok to refill using weight-based dosing.     If >3g daily and/or sig is not \"prn\", check for liver enzymes. If normal in the last year, ok to refill.  If not, refer to the provider.            "

## 2018-11-07 ENCOUNTER — ANTICOAGULATION THERAPY VISIT (OUTPATIENT)
Dept: ANTICOAGULATION | Facility: CLINIC | Age: 63
End: 2018-11-07
Payer: COMMERCIAL

## 2018-11-07 DIAGNOSIS — Z95.2 AORTIC VALVE PROSTHESIS PRESENT: ICD-10-CM

## 2018-11-07 DIAGNOSIS — I48.91 ATRIAL FIBRILLATION (H): ICD-10-CM

## 2018-11-07 LAB — INR POINT OF CARE: 4.1 (ref 0.86–1.14)

## 2018-11-07 PROCEDURE — 85610 PROTHROMBIN TIME: CPT | Mod: QW

## 2018-11-07 PROCEDURE — 36416 COLLJ CAPILLARY BLOOD SPEC: CPT

## 2018-11-07 PROCEDURE — 99207 ZZC NO CHARGE NURSE ONLY: CPT

## 2018-11-07 RX ORDER — ACETAMINOPHEN 650 MG/1
TABLET, FILM COATED, EXTENDED RELEASE ORAL
Qty: 180 TABLET | Refills: 1 | Status: SHIPPED | OUTPATIENT
Start: 2018-11-07 | End: 2019-05-16

## 2018-11-07 NOTE — MR AVS SNAPSHOT
Todd S Aschoff   11/7/2018 3:15 PM   Anticoagulation Therapy Visit    Description:  62 year old male   Provider[facility]  [facility]   Department:  [facility]           INR as of 11/7/2018     Today's INR 4.1!      Anticoagulation Summary as of 11/7/2018     INR goal 2.5-3.5   Today's INR 4.1!   Full warfarin instructions 11/7: 2.5 mg; 11/8: 5 mg; 11/15: 5 mg; Otherwise 5 mg on Mon, Wed, Sat; 7.5 mg all other days   Next INR check 11/21/2018    Indications   Long-term (current) use of anticoagulants [Z79.01] [Z79.01]  Aortic valve prosthesis present [Z95.2]  Atrial fibrillation (H) [I48.91]         Your next Anticoagulation Clinic appointment(s)     Nov 21, 2018 11:00 AM CST   [facility]ticoagulation Visit with [facility]   [facility]    [address] [facility] [address]  [facility] [address] [phone]              Contact Numbers     [facility] Phone Numbers:  Anticoagulation Clinic Appointments : [phone]  Anticoagulation Nurse: [phone]         November 2018 Details    Sun Mon Tue Wed Thu Fri Sat         1               2               3                 4               5               6               7      2.5 mg   See details      8      5 mg         9      7.5 mg         10      5 mg           11      7.5 mg         12      5 mg         13      7.5 mg         14      5 mg         15      5 mg         16      7.5 mg         17      5 mg           18      7.5 mg         19      5 mg         20      7.5 mg         21            22               23               24                 25               26               27               28               29               30                 Date Details   11/07 This INR check       Date of next INR:  11/21/2018         How to take your warfarin dose     To take:  2.5 mg Take 0.5 of a 5 mg tablet.    To take:  5 mg Take 1 of the 5 mg tablets.    To take:  7.5 mg Take 1.5 of the 5 mg  tablets.

## 2018-11-07 NOTE — PROGRESS NOTES
ANTICOAGULATION FOLLOW-UP CLINIC VISIT    Patient Name:  Todd S Aschoff  Date:  11/7/2018  Contact Type:  Face to Face    SUBJECTIVE:     Patient Findings     Positives No Problem Findings           OBJECTIVE    INR Protime   Date Value Ref Range Status   11/07/2018 4.1 (A) 0.86 - 1.14 Final       ASSESSMENT / PLAN  INR assessment SUPRA    Recheck INR In: 2 WEEKS    INR Location Clinic      Anticoagulation Summary as of 11/7/2018     INR goal 2.5-3.5   Today's INR 4.1!   Warfarin maintenance plan 5 mg (5 mg x 1) on Mon, Wed, Sat; 7.5 mg (5 mg x 1.5) all other days   Full warfarin instructions 11/7: 2.5 mg; 11/8: 5 mg; 11/15: 5 mg; Otherwise 5 mg on Mon, Wed, Sat; 7.5 mg all other days   Weekly warfarin total 45 mg   Plan last modified Amanda Wheeler RN (10/17/2018)   Next INR check 11/21/2018   Priority INR   Target end date     Indications   Long-term (current) use of anticoagulants [Z79.01] [Z79.01]  Aortic valve prosthesis present [Z95.2]  Atrial fibrillation (H) [I48.91]         Anticoagulation Episode Summary     INR check location     Preferred lab     Send INR reminders to [facility]    Comments       Anticoagulation Care Providers     Provider Role Specialty Phone number    Obdulio Ingram MD [facility] [phone]            See the Encounter Report to view Anticoagulation Flowsheet and Dosing Calendar (Go to Encounters tab in chart review, and find the Anticoagulation Therapy Visit)    Dosage adjustment made based on physician directed care plan.    Amanda Wheeler RN

## 2018-11-11 NOTE — PROGRESS NOTES
[facility] - Procedure Note    Date of Visit: 11/11/2018    Procedure performed:  Right intra-articular knee injection  Diagnosis: Right Knee osteoarthritis  : Parish Quinteros DO  Anesthesia: none  Complications: none    Indications: Todd S Aschoff is a 62 year old male who is seen at the request of Dr. Navarrete for a intraarticular knee injection. The patient describes knee pain, worse with ambulation. Symptoms have been persistent, disabling, and intermittently severe. The patient reports minimal improvement with conservative treatment, including oral medications and therapy. He had prior knee injections, last about 3 years ago with significant and prolonged relief..    Allergies:      Allergies   Allergen Reactions     No Known Allergies         Vitals:  /85  Pulse 79  SpO2 96%    Review of Systems: The patient denies recent fever, chills, illness, use of antibiotics or anticoagulants. All other 10-point review of systems negative.     Procedure:  The procedure and risks were explained, and informed written consent was obtained from the patient. Risks include but are not limited to: infection, bleeding, increased pain, and damage to soft tissue, nerve, muscle, and vasculature structures. After getting informed consent, patient was placed in a sitting position. The patient s knee was prepped with chlorhexidine in sterile fashion.     Injection was performed using sterile technique.  A 1.5-inch 25-gauge needle was used to enter the infero-lateral aspect of the RIGHT knee.  After negative aspiration for fluid/blood a solution containing 40mg of kenolog and 2mL of 0.5% Bupivicaine was injected.  There were no complications. The patient tolerated the procedure well. There was negligible bleeding. A band aid was placed over the injection site.     Assessment/Plan: Todd S Aschoff is a 62 year old male s/p RIGHT intra-articular knee steroid injection today for knee  pain/osteoarthritis.     1. Following today's procedure, the patient was advised to contact the [facility] for any of the following:   Fever, chills, or night sweats   New onset of pain, swelling, redness, numbness, or weakness   Any questions/concerns regarding the procedure  If unable to contact the Pain Center, the patient was instructed to go to a local Emergency Room for any complications.   2. The patient was instructed to ice the knees and refrain from overuse over the next 3 days.    3. Follow-up with Dr. Quinteros.      Parish Quinteros,   [facility]

## 2018-11-12 ENCOUNTER — OFFICE VISIT (OUTPATIENT)
Dept: PALLIATIVE MEDICINE | Facility: CLINIC | Age: 63
End: 2018-11-12
Payer: COMMERCIAL

## 2018-11-12 VITALS — SYSTOLIC BLOOD PRESSURE: 121 MMHG | OXYGEN SATURATION: 96 % | HEART RATE: 79 BPM | DIASTOLIC BLOOD PRESSURE: 85 MMHG

## 2018-11-12 DIAGNOSIS — M17.10 ARTHRITIS OF KNEE: Primary | ICD-10-CM

## 2018-11-12 PROCEDURE — 20610 DRAIN/INJ JOINT/BURSA W/O US: CPT | Mod: RT | Performed by: PHYSICAL MEDICINE & REHABILITATION

## 2018-11-12 ASSESSMENT — PAIN SCALES - GENERAL: PAINLEVEL: EXTREME PAIN (8)

## 2018-11-12 NOTE — MR AVS SNAPSHOT
After Visit Summary   11/12/2018    Todd S Aschoff    MRN: [id number]           Patient Information     Date Of Birth          1955        Visit Information        Provider Department      11/12/2018 11:00 AM Parish Quinteros MD [facility]        Care Instructions    [facility]   Post Procedure Instructions    Today you had:  Right knee joint injection    Medications used:  bupivicaine   kenolog           Go to the emergency room if you develop any shortness of breath    Monitor the injection sites for signs and symptoms of infection-fever, chills, redness, swelling, warmth, or drainage to areas.    You may have soreness at injection sites for up to 24 hours.    You may apply ice to the painful areas to help minimize the discomfort of the needle pokes.    Do not apply heat to sites for at least 12 hours.    You may use anti-inflammatory medications or Tylenol for pain control if necessary    Pain Clinic phone number during work hours Monday-Friday:  [phone]    After hours provider line: [phone]                Follow-ups after your visit        Your next 10 appointments already scheduled     Nov 21, 2018 11:00 AM CST   [facility] with [facility]    [address] [facility] [address]  [facility] [address] [phone]              Who to contact     If you have questions or need follow up information about today's clinic visit or your schedule please contact [facility] directly at [phone].  Normal or non-critical lab and imaging results will be communicated to you by MyChart, letter or phone within 4 business days after the clinic has received the results. If you do not hear from us within 7 days, please contact the clinic through MyChart or phone. If you have a critical or abnormal lab result, we will notify you by phone as soon as  possible.  Submit refill requests through [employer] or call your pharmacy and they will forward the refill request to us. Please allow 3 business days for your refill to be completed.          Additional Information About Your Visit        [employer] Information     [employer] gives you secure access to your electronic health record. If you see a primary care provider, you can also send messages to your care team and make appointments. If you have questions, please call your primary care clinic.  If you do not have a primary care provider, please call [phone] and they will assist you.        Care EveryWhere ID     This is your Care EveryWhere ID. This could be used by other organizations to access your [facility] medical records  [id number]        Your Vitals Were     Pulse Pulse Oximetry                79 96%           Blood Pressure from Last 3 Encounters:   11/12/18 121/85   10/23/18 138/89   09/13/18 110/74    Weight from Last 3 Encounters:   08/15/18 143.3 kg (316 lb)   08/02/18 143.3 kg (316 lb)   06/14/18 136.1 kg (300 lb)              Today, you had the following     No orders found for display       Primary Care Provider Office Phone # Fax #    Obdulio Ingram [phone]       [address] [facility] [address]  [place] [id number]        Equal Access to Services     PERRI WALTON : Hadii aad ku hadasho Soomaali, waaxda luqadaha, qaybta kaalmada adeegyada, waxay geoffreyin haynathanieln margarito lebron lalynda hunt. So [facility] [phone].    ATENCIÓN: Si habla español, tiene a thomas disposición servicios gratuitos de asistencia lingüística. Llame al [phone].    We comply with applicable federal civil rights laws and [place] laws. We do not discriminate on the basis of race, color, national origin, age, disability, sex, sexual orientation, or gender identity.            Thank you!     Thank you for choosing [facility]  for your care. Our goal is always to provide you with excellent care. Hearing back  from our patients is one way we can continue to improve our services. Please take a few minutes to complete the written survey that you may receive in the mail after your visit with us. Thank you!             Your Updated Medication List - Protect others around you: Learn how to safely use, store and throw away your medicines at www.disposemymeds.org.          This list is accurate as of 11/12/18 11:17 AM.  Always use your most recent med list.                   Brand Name Dispense Instructions for use Diagnosis    * acetaminophen 650 MG 8 hour tablet    ACETAMINOPHEN 8 HOUR    250 tablet    Take 650 mg by mouth 2 times daily    Chronic low back pain       * MAPAP ARTHRITIS PAIN 650 MG CR tablet   Generic drug:  acetaminophen     180 tablet    TAKE ONE TABLET BY MOUTH TWICE DAILY    Chronic low back pain       azelastine 0.1 % nasal spray    ASTELIN    30 mL    USE 2 SPRAYS IN NOSTRIL 2 TIMES DAILY AS NEEDED.    Other chronic rhinitis       cetirizine 10 MG tablet    zyrTEC    90 tablet    TAKE ONE TABLET BY MOUTH ONCE DAILY AS NEEDED    Chronic rhinitis       cyclobenzaprine 10 MG tablet    FLEXERIL    270 tablet    Take 1 tablet (10 mg) by mouth 3 times daily as needed for muscle spasms    Muscle spasm       diazepam 5 MG tablet    VALIUM    270 tablet    TAKE ONE TABLET BY MOUTH EVERY EIGHT HOURS AS NEEDED FOR ANXIETY OR MUSCLE SPASMS    Back muscle spasm       donepezil 10 MG tablet    ARICEPT    90 tablet    Take 1 tablet (10 mg) by mouth At Bedtime    Memory difficulties       enoxaparin 150 MG/ML injection    LOVENOX    10 Syringe    Inject 0.8 mLs (120 mg) Subcutaneous every 12 hours    Aortic valve prosthesis present, Long term current use of anticoagulant therapy       guanFACINE 1 MG tablet    TENEX    90 tablet    Take 1 tablet (1 mg) by mouth At Bedtime    Major depressive disorder, recurrent episode, in full remission (H)       isometheptene-acetaminophen-dichloralphenazone -100 MG per capsule     MIDRIN    30 capsule    Take 1 capsule by mouth 4 times daily as needed    Headache(784.0)       levothyroxine 150 MCG tablet    SYNTHROID/LEVOTHROID    90 tablet    Take 1 tablet (150 mcg) by mouth daily    Acquired hypothyroidism       mirabegron 50 MG 24 hr tablet    MYRBETRIQ    90 tablet    Take 1 tablet (50 mg) by mouth daily    Overactive bladder       nicotine polacrilex 2 MG lozenge    COMMIT    360 lozenge    Place 1 lozenge (2 mg) inside cheek every hour as needed for smoking cessation    Tobacco abuse       pregabalin 100 MG capsule    LYRICA    270 capsule    Take 1 capsule (100 mg) by mouth 3 times daily Do not take if sleepy/sedated.    Chronic low back pain with sciatica, sciatica laterality unspecified, unspecified back pain laterality       propafenone 150 MG Tabs tablet    RYTHMOL    270 tablet    Take 1 tablet (150 mg) by mouth every 8 hours    Chronic atrial fibrillation (H)       senna-docusate 8.6-50 MG per tablet    SENOKOT-S;PERICOLACE    60 tablet    Take 1 tablet by mouth 2 times daily as needed for constipation    Constipation, unspecified constipation type       simvastatin 40 MG tablet    ZOCOR    90 tablet    Take 1 tablet (40 mg) by mouth At Bedtime    Hyperlipidemia LDL goal <130       traZODone 50 MG tablet    DESYREL    90 tablet    Take 1 tablet (50 mg) by mouth At Bedtime    Primary insomnia       venlafaxine 150 MG Tb24 24 hr tablet    EFFEXOR-ER    90 each    Take 1 tablet (150 mg) by mouth daily (with breakfast)    Major depressive disorder, recurrent episode, in full remission (H)       warfarin 5 MG tablet    COUMADIN    110 tablet    Take 1 1/2 tablets (7.5 mg) by mouth Sun, Tu, Th, Fri and take 1 tablet (5 mg) Mon, Wed, Sat or as instructed by INR Clinic.    Current use of long term anticoagulation       * Notice:  This list has 2 medication(s) that are the same as other medications prescribed for you. Read the directions carefully, and ask your doctor or other care  provider to review them with you.

## 2018-11-12 NOTE — PATIENT INSTRUCTIONS
[facility]   Post Procedure Instructions    Today you had:  Right knee joint injection    Medications used:  bupivicaine   kenolog           Go to the emergency room if you develop any shortness of breath    Monitor the injection sites for signs and symptoms of infection-fever, chills, redness, swelling, warmth, or drainage to areas.    You may have soreness at injection sites for up to 24 hours.    You may apply ice to the painful areas to help minimize the discomfort of the needle pokes.    Do not apply heat to sites for at least 12 hours.    You may use anti-inflammatory medications or Tylenol for pain control if necessary    Pain Clinic phone number during work hours Monday-Friday:  [phone]    After hours provider line: [phone]

## 2018-11-14 DIAGNOSIS — M96.1 FAILED BACK SYNDROME: Primary | ICD-10-CM

## 2018-11-14 DIAGNOSIS — G89.4 CHRONIC PAIN SYNDROME: ICD-10-CM

## 2018-11-26 ENCOUNTER — ANTICOAGULATION THERAPY VISIT (OUTPATIENT)
Dept: NURSING | Facility: CLINIC | Age: 63
End: 2018-11-26
Payer: COMMERCIAL

## 2018-11-26 DIAGNOSIS — Z95.2 AORTIC VALVE PROSTHESIS PRESENT: ICD-10-CM

## 2018-11-26 DIAGNOSIS — I48.91 ATRIAL FIBRILLATION (H): ICD-10-CM

## 2018-11-26 LAB — INR POINT OF CARE: 2.8 (ref 0.86–1.14)

## 2018-11-26 PROCEDURE — 85610 PROTHROMBIN TIME: CPT | Mod: QW

## 2018-11-26 PROCEDURE — 36416 COLLJ CAPILLARY BLOOD SPEC: CPT

## 2018-11-26 NOTE — PROGRESS NOTES
ANTICOAGULATION FOLLOW-UP CLINIC VISIT    Patient Name:  Todd S Aschoff  Date:  11/26/2018  Contact Type:  Face to Face  INR today is in range, no concerns from pt. Advised to continue maintenance dosing & recheck in 2 weeks.    Pt lives in [place], so would like to be seen in either [place] or [facility] INR clinics depending on the week.     SUBJECTIVE:     Patient Findings     Positives No Problem Findings    Comments Denies bleeding/bruising or missed dose.             OBJECTIVE    INR Protime   Date Value Ref Range Status   11/26/2018 2.8 (A) 0.86 - 1.14 Final       ASSESSMENT / PLAN  INR assessment THER    Recheck INR In: 2 WEEKS    INR Location Clinic      Anticoagulation Summary as of 11/26/2018     INR goal 2.5-3.5   Today's INR 2.8   Warfarin maintenance plan 5 mg (5 mg x 1) on Mon, Wed, Sat; 7.5 mg (5 mg x 1.5) all other days   Full warfarin instructions 5 mg on Mon, Wed, Sat; 7.5 mg all other days   Weekly warfarin total 45 mg   Plan last modified Amanda Wheeler RN (10/17/2018)   Next INR check 12/10/2018   Priority INR   Target end date     Indications   Long-term (current) use of anticoagulants [Z79.01] [Z79.01]  Aortic valve prosthesis present [Z95.2]  Atrial fibrillation (H) [I48.91]         Anticoagulation Episode Summary     INR check location     Preferred lab     Send [facility]ers [facility]    Comments       Anticoagulation Care Providers     Provider Role Specialty Phone number    Obdulio Ingram MD [facility] Internal Medicine [phone]            See the Encounter Report to view Anticoagulation Flowsheet and Dosing Calendar (Go to Encounters tab in chart review, and find the Anticoagulation Therapy Visit)    Meg Werner RN

## 2018-11-26 NOTE — MR AVS SNAPSHOT
Todd S Aschoff   11/26/2018 10:15 AM   Anticoagulation Therapy Visit    Description:  62 year old male   Provider:   ANTICOAGULATION CLINIC   Department:   Nurse           INR as of 11/26/2018     Today's INR 2.8      Anticoagulation Summary as of 11/26/2018     INR goal 2.5-3.5   Today's INR 2.8   Full warfarin instructions 5 mg on Mon, Wed, Sat; 7.5 mg all other days   Next INR check 12/10/2018    Indications   Long-term (current) use of anticoagulants [Z79.01] [Z79.01]  Aortic valve prosthesis present [Z95.2]  Atrial fibrillation (H) [I48.91]         Your next Anticoagulation Clinic appointment(s)     Dec 10, 2018 10:15 AM CST   Anticoagulation Visit with  ANTICOAGULATION CLINIC   [facility]    [address] [place] [address]  [address]  [facility] [id number] [phone]              Contact Numbers     [facility]  Please call  [phone] to cancel and/or reschedule your appointment   Please call  [phone] with any problems or questions regarding your therapy.        November 2018 Details    Sun Mon Tue Wed Thu Fri Sat         1               2               3                 4               5               6               7               8               9               10                 11               12               13               14               15               16               17                 18               19               20               21               22               23               24                 25               26      5 mg   See details      27      7.5 mg         28      5 mg         29      7.5 mg         30      7.5 mg           Date Details   11/26 This INR check               How to take your warfarin dose     To take:  5 mg Take 1 of the 5 mg tablets.    To take:  7.5 mg Take 1.5 of the 5 mg tablets.           December 2018 Details    Sun Mon Tue Wed Thu Fri Sat           1      5 mg           2      7.5 mg         3       5 mg         4      7.5 mg         5      5 mg         6      7.5 mg         7      7.5 mg         8      5 mg           9      7.5 mg         10            11               12               13               14               15                 16               17               18               19               20               21               22                 23               24               25               26               27               28               29                 30               31                     Date Details   No additional details    Date of next INR:  12/10/2018         How to take your warfarin dose     To take:  5 mg Take 1 of the 5 mg tablets.    To take:  7.5 mg Take 1.5 of the 5 mg tablets.

## 2018-12-10 ENCOUNTER — ANTICOAGULATION THERAPY VISIT (OUTPATIENT)
Dept: NURSING | Facility: CLINIC | Age: 63
End: 2018-12-10
Payer: COMMERCIAL

## 2018-12-10 DIAGNOSIS — I48.91 ATRIAL FIBRILLATION (H): ICD-10-CM

## 2018-12-10 DIAGNOSIS — Z95.2 AORTIC VALVE PROSTHESIS PRESENT: ICD-10-CM

## 2018-12-10 LAB — INR POINT OF CARE: 3.9 (ref 0.86–1.14)

## 2018-12-10 PROCEDURE — 99207 ZZC NO CHARGE NURSE ONLY: CPT

## 2018-12-10 PROCEDURE — 85610 PROTHROMBIN TIME: CPT | Mod: QW

## 2018-12-10 PROCEDURE — 36416 COLLJ CAPILLARY BLOOD SPEC: CPT

## 2018-12-13 NOTE — PROGRESS NOTES
ANTICOAGULATION FOLLOW-UP CLINIC VISIT  Late Entry: Unable to finish charting because of the Epic upgrade on 12/10:    Patient Name:  Todd S Aschoff  Date:  12/12/2018  Contact Type:  Face to Face  Unable to find the cause for the abnormal INR result. Denies change in diet, new med/supplement, illness, inflammation, bleeding/bruising or any alcohol intake. Pt already took his coumadin today. So, advised to decrease his coumadin dose for Tuesday & recheck in 2 weeks. Pt agrees.     SUBJECTIVE:     Patient Findings     Positives:   No Problem Findings    Comments:   Denies bleeding/bruising or missed dose.             OBJECTIVE    INR Protime   Date Value Ref Range Status   12/10/2018 3.9 (A) 0.86 - 1.14 Final       ASSESSMENT / PLAN  INR assessment SUPRA    Recheck INR In: 2 WEEKS    INR Location Clinic      Anticoagulation Summary  As of 12/10/2018    INR goal:   2.5-3.5   TTR:   55.3 % (2.4 y)   INR used for dosing:   3.9! (12/10/2018)   Warfarin maintenance plan:   5 mg (5 mg x 1) every Mon, Wed, Sat; 7.5 mg (5 mg x 1.5) all other days   Full warfarin instructions:   12/11: 2.5 mg; Otherwise 5 mg every Mon, Wed, Sat; 7.5 mg all other days   Weekly warfarin total:   45 mg   Plan last modified:   Amanda Wheeler RN (10/17/2018)   Next INR check:   12/24/2018   Priority:   INR   Target end date:       Indications    Long-term (current) use of anticoagulants [Z79.01] [Z79.01]  Aortic valve prosthesis present [Z95.2]  Atrial fibrillation (H) [I48.91]             Anticoagulation Episode Summary     INR check location:       Preferred lab:       Send INR reminders to:   [facility]    Comments:         Anticoagulation Care Providers     Provider Role Specialty Phone number    Obdulio Ingram MD Responsible Internal Medicine [phone]            See the Encounter Report to view Anticoagulation Flowsheet and Dosing Calendar (Go to Encounters tab in chart review, and find the Anticoagulation Therapy Visit)    Meg  SHYANN Werner

## 2018-12-27 ENCOUNTER — ANTICOAGULATION THERAPY VISIT (OUTPATIENT)
Dept: NURSING | Facility: CLINIC | Age: 63
End: 2018-12-27
Payer: COMMERCIAL

## 2018-12-27 DIAGNOSIS — Z79.01 LONG TERM CURRENT USE OF ANTICOAGULANT THERAPY: Primary | ICD-10-CM

## 2018-12-27 DIAGNOSIS — Z95.2 AORTIC VALVE PROSTHESIS PRESENT: ICD-10-CM

## 2018-12-27 DIAGNOSIS — I48.91 ATRIAL FIBRILLATION (H): ICD-10-CM

## 2018-12-27 LAB — INR POINT OF CARE: 4.1 (ref 0.86–1.14)

## 2018-12-27 PROCEDURE — 36416 COLLJ CAPILLARY BLOOD SPEC: CPT

## 2018-12-27 PROCEDURE — 85610 PROTHROMBIN TIME: CPT | Mod: QW

## 2018-12-27 NOTE — PROGRESS NOTES
ANTICOAGULATION FOLLOW-UP CLINIC VISIT    Patient Name:  Todd S Aschoff  Date:  2018  Contact Type:  Face to Face    SUBJECTIVE:     Patient Findings     Positives:   Unexplained INR or factor level change    Comments:   Patient denies: extra doses, illness, inflammation,   bleeding/bruises, medication changes, diet changes,  or any alcohol intake.    INR has been high the past 2 readings.  Maintenance dose decreased.               OBJECTIVE    INR Protime   Date Value Ref Range Status   2018 4.1 (A) 0.86 - 1.14 Final       ASSESSMENT / PLAN  INR assessment SUPRA    Recheck INR In: 10 DAYS    INR Location Clinic      Anticoagulation Summary  As of 2018    INR goal:   2.5-3.5   TTR:   54.3 % (2.5 y)   INR used for dosin.1! (2018)   Warfarin maintenance plan:   7.5 mg (5 mg x 1.5) every Sun, Thu; 5 mg (5 mg x 1) all other days   Full warfarin instructions:   : 2.5 mg; Otherwise 7.5 mg every Sun, Thu; 5 mg all other days   Weekly warfarin total:   40 mg   Plan last modified:   Nova John, RN (2018)   Next INR check:   2019   Priority:   INR   Target end date:       Indications    Long-term (current) use of anticoagulants [Z79.01] [Z79.01]  Aortic valve prosthesis present [Z95.2]  Atrial fibrillation (H) [I48.91]             Anticoagulation Episode Summary     INR check location:       Preferred lab:       Send INR reminders to:   [facility]    Comments:         Anticoagulation Care Providers     Provider Role Specialty Phone number    Obdulio Ingram MD [facility] Internal Medicine [phone]            See the Encounter Report to view Anticoagulation Flowsheet and Dosing Calendar (Go to Encounters tab in chart review, and find the Anticoagulation Therapy Visit)    Dosage adjustment made based on physician directed care plan.        Nova John RN

## 2019-01-08 ENCOUNTER — ANTICOAGULATION THERAPY VISIT (OUTPATIENT)
Dept: NURSING | Facility: CLINIC | Age: 64
End: 2019-01-08
Payer: COMMERCIAL

## 2019-01-08 DIAGNOSIS — I48.91 ATRIAL FIBRILLATION (H): ICD-10-CM

## 2019-01-08 DIAGNOSIS — Z95.2 AORTIC VALVE PROSTHESIS PRESENT: ICD-10-CM

## 2019-01-08 DIAGNOSIS — M62.830 BACK MUSCLE SPASM: ICD-10-CM

## 2019-01-08 DIAGNOSIS — Z79.01 LONG TERM CURRENT USE OF ANTICOAGULANT THERAPY: Primary | ICD-10-CM

## 2019-01-08 DIAGNOSIS — Z79.899 CONTROLLED SUBSTANCE AGREEMENT SIGNED: Primary | ICD-10-CM

## 2019-01-08 LAB — INR POINT OF CARE: 2.7 (ref 0.86–1.14)

## 2019-01-08 PROCEDURE — 36416 COLLJ CAPILLARY BLOOD SPEC: CPT

## 2019-01-08 PROCEDURE — 85610 PROTHROMBIN TIME: CPT | Mod: QW

## 2019-01-08 NOTE — PROGRESS NOTES
ANTICOAGULATION FOLLOW-UP CLINIC VISIT    Patient Name:  Todd S Aschoff  Date:  2019  Contact Type:  Face to Face    SUBJECTIVE:     Patient Findings     Positives:   No Problem Findings           OBJECTIVE    INR Protime   Date Value Ref Range Status   2019 2.7 (A) 0.86 - 1.14 Final       ASSESSMENT / PLAN  INR assessment THER    Recheck INR In: 3 WEEKS    INR Location Clinic      Anticoagulation Summary  As of 2019    INR goal:   2.5-3.5   TTR:   54.4 % (2.5 y)   INR used for dosin.7 (2019)   Warfarin maintenance plan:   7.5 mg (5 mg x 1.5) every Sun, Thu; 5 mg (5 mg x 1) all other days   Full warfarin instructions:   7.5 mg every Sun, Thu; 5 mg all other days   Weekly warfarin total:   40 mg   No change documented:   Nova John RN   Plan last modified:   Nova John RN (2018)   Next INR check:   2019   Priority:   INR   Target end date:       Indications    Long-term (current) use of anticoagulants [Z79.01] [Z79.01]  Aortic valve prosthesis present [Z95.2]  Atrial fibrillation (H) [I48.91]             Anticoagulation Episode Summary     INR check location:       Preferred lab:       Send INR reminders to:   [facility]    Comments:   5mg tabs / CALENDAR      Anticoagulation Care Providers     Provider Role Specialty Phone number    Obdulio Ingram MD [facility] Internal Medicine [phone]            See the Encounter Report to view Anticoagulation Flowsheet and Dosing Calendar (Go to Encounters tab in chart review, and find the Anticoagulation Therapy Visit)        Nova John RN

## 2019-01-09 NOTE — TELEPHONE ENCOUNTER
Requested Prescriptions   Pending Prescriptions Disp Refills     diazepam (VALIUM) 5 MG tablet [Pharmacy Med Name: DiazePAM Oral Tablet 5 MG] 270 tablet 0     Sig: TAKE 1 TABLET BY MOUTH EVERY 8 HOURS AS NEEDED FOR ANXIETY OR MUSCLE SPASMS    There is no refill protocol information for this order      Last Written Prescription Date:  08/02/2018  Last Fill Quantity: 270,  # refills: 1   Last office visit: 8/2/2018 with prescribing provider:     Future Office Visit:

## 2019-01-09 NOTE — TELEPHONE ENCOUNTER
Signed CSA on file.   [place] reviewed:  reviewed- no concerns    [place]  [email or web address]    Routing refill request to provider for review/approval because:  Drug not on the [facility] refill protocol

## 2019-01-11 RX ORDER — DIAZEPAM 5 MG
TABLET ORAL
Qty: 270 TABLET | Refills: 0 | Status: SHIPPED | OUTPATIENT
Start: 2019-01-11 | End: 2019-04-17

## 2019-01-23 DIAGNOSIS — Z79.01 CURRENT USE OF LONG TERM ANTICOAGULATION: ICD-10-CM

## 2019-01-23 RX ORDER — WARFARIN SODIUM 5 MG/1
TABLET ORAL
Qty: 110 TABLET | Refills: 0 | Status: SHIPPED | OUTPATIENT
Start: 2019-01-23 | End: 2019-04-25

## 2019-01-23 NOTE — TELEPHONE ENCOUNTER
"Requested Prescriptions   Pending Prescriptions Disp Refills     warfarin (COUMADIN) 5 MG tablet [Pharmacy Med Name: Warfarin Sodium Oral Tablet 5 MG] 110 tablet 0    Last Written Prescription Date:  11/02/2018  Last Fill Quantity: 110,  # refills: 0   Last office visit: 8/2/2018 with prescribing provider:     Future Office Visit:   Sig: Take 1 1/2 tablets (7.5 mg) by mouth Sun, Tu, Th, Fri and take 1 tablet (5 mg) Mon, Wed, Sat or as instructed by INR Clinic.    Vitamin K Antagonists Failed - 1/23/2019 11:38 AM       Failed - INR is within goal in the past 6 weeks    Confirm INR is within goal in the past 6 weeks.     Recent Labs   Lab Test 01/08/19  1506   INR 2.7*                      Passed - Recent (12 mo) or future (30 days) visit within the authorizing provider's specialty    Patient had office visit in the last 12 months or has a visit in the next 30 days with authorizing provider or within the authorizing provider's specialty.  See \"Patient Info\" tab in inbasket, or \"Choose Columns\" in Meds & Orders section of the refill encounter.             Passed - Medication is active on med list       Passed - Patient is 18 years of age or older        "

## 2019-01-29 ENCOUNTER — ANTICOAGULATION THERAPY VISIT (OUTPATIENT)
Dept: NURSING | Facility: CLINIC | Age: 64
End: 2019-01-29
Payer: COMMERCIAL

## 2019-01-29 DIAGNOSIS — I48.91 ATRIAL FIBRILLATION (H): ICD-10-CM

## 2019-01-29 DIAGNOSIS — Z79.01 LONG TERM CURRENT USE OF ANTICOAGULANT THERAPY: Primary | ICD-10-CM

## 2019-01-29 DIAGNOSIS — Z95.2 AORTIC VALVE PROSTHESIS PRESENT: ICD-10-CM

## 2019-01-29 LAB — INR POINT OF CARE: 2.6 (ref 0.86–1.14)

## 2019-01-29 PROCEDURE — 36416 COLLJ CAPILLARY BLOOD SPEC: CPT

## 2019-01-29 PROCEDURE — 85610 PROTHROMBIN TIME: CPT | Mod: QW

## 2019-01-29 NOTE — PROGRESS NOTES
ANTICOAGULATION FOLLOW-UP CLINIC VISIT    Patient Name:  Todd S Aschoff  Date:  2019  Contact Type:  Face to Face    SUBJECTIVE:     Patient Findings     Positives:   No Problem Findings           OBJECTIVE    INR Protime   Date Value Ref Range Status   2019 2.6 (A) 0.86 - 1.14 Final       ASSESSMENT / PLAN  INR assessment THER    Recheck INR In: 3 WEEKS    INR Location Clinic      Anticoagulation Summary  As of 2019    INR goal:   2.5-3.5   TTR:   55.4 % (2.6 y)   INR used for dosin.6 (2019)   Warfarin maintenance plan:   7.5 mg (5 mg x 1.5) every Sun, Thu; 5 mg (5 mg x 1) all other days   Full warfarin instructions:   7.5 mg every Sun, Thu; 5 mg all other days   Weekly warfarin total:   40 mg   No change documented:   Nova John RN   Plan last modified:   Nova John RN (2018)   Next INR check:   2019   Priority:   INR   Target end date:       Indications    Aortic valve prosthesis present [Z95.2]  Atrial fibrillation (H) [I48.91]  Long term current use of anticoagulant therapy [Z79.01]             Anticoagulation Episode Summary     INR check location:       Preferred lab:       Send INR reminders to:   [facility]    Comments:   5mg tabs / CALENDAR      Anticoagulation Care Providers     Provider Role Specialty Phone number    Obdulio Ingram MD [facility] Internal Medicine [phone]            See the Encounter Report to view Anticoagulation Flowsheet and Dosing Calendar (Go to Encounters tab in chart review, and find the Anticoagulation Therapy Visit)        Nova John RN

## 2019-02-19 ENCOUNTER — ANTICOAGULATION THERAPY VISIT (OUTPATIENT)
Dept: NURSING | Facility: CLINIC | Age: 64
End: 2019-02-19
Payer: COMMERCIAL

## 2019-02-19 DIAGNOSIS — Z95.2 AORTIC VALVE PROSTHESIS PRESENT: ICD-10-CM

## 2019-02-19 DIAGNOSIS — I48.91 ATRIAL FIBRILLATION (H): ICD-10-CM

## 2019-02-19 DIAGNOSIS — Z79.01 LONG TERM CURRENT USE OF ANTICOAGULANT THERAPY: Primary | ICD-10-CM

## 2019-02-19 LAB — INR POINT OF CARE: 2.3 (ref 0.86–1.14)

## 2019-02-19 PROCEDURE — 36416 COLLJ CAPILLARY BLOOD SPEC: CPT

## 2019-02-19 PROCEDURE — 85610 PROTHROMBIN TIME: CPT | Mod: QW

## 2019-02-19 NOTE — PROGRESS NOTES
ANTICOAGULATION FOLLOW-UP CLINIC VISIT    Patient Name:  Todd S Aschoff  Date:  2019  Contact Type:  Face to Face    SUBJECTIVE:     Patient Findings     Positives:   Unexplained INR or factor level change    Comments:   Patient denies missing any doses, or   excess intake of green vegetables, or   medication changes.             OBJECTIVE    INR Protime   Date Value Ref Range Status   2019 2.3 (A) 0.86 - 1.14 Final       ASSESSMENT / PLAN  INR assessment SUB    Recheck INR In: 3 WEEKS    INR Location Clinic      Anticoagulation Summary  As of 2019    INR goal:   2.5-3.5   TTR:   54.9 % (2.7 y)   INR used for dosin.3! (2019)   Warfarin maintenance plan:   7.5 mg (5 mg x 1.5) every Sun, Thu; 5 mg (5 mg x 1) all other days   Full warfarin instructions:   : 7.5 mg; Otherwise 7.5 mg every Sun, Thu; 5 mg all other days   Weekly warfarin total:   40 mg   Plan last modified:   Nova John RN (2018)   Next INR check:   3/12/2019   Priority:   INR   Target end date:       Indications    Aortic valve prosthesis present [Z95.2]  Atrial fibrillation (H) [I48.91]  Long term current use of anticoagulant therapy [Z79.01]             Anticoagulation Episode Summary     INR check location:       Preferred lab:       Send INR reminders to:   [facility]    Comments:   5mg tabs / CALENDAR      Anticoagulation Care Providers     Provider Role Specialty Phone number    Obdulio Ingram MD [facility] Internal Medicine [phone]            See the Encounter Report to view Anticoagulation Flowsheet and Dosing Calendar (Go to Encounters tab in chart review, and find the Anticoagulation Therapy Visit)    Dosage adjustment made based on physician directed care plan.    Nova John RN

## 2019-03-14 ENCOUNTER — TELEPHONE (OUTPATIENT)
Dept: INTERNAL MEDICINE | Facility: CLINIC | Age: 64
End: 2019-03-14

## 2019-03-14 NOTE — TELEPHONE ENCOUNTER
Prior Authorization Retail Medication Request    Medication/Dose: Myrbetriq  ICD code (if different than what is on RX):  Unknown  Previously Tried and Failed:  unknown  Rationale:  Unknown    Insurance Name:  [place]  Insurance ID:  unknown      Pharmacy Information (if different than what is on RX)  Name:  [facility]  Phone:  [phone]

## 2019-03-19 ENCOUNTER — ANTICOAGULATION THERAPY VISIT (OUTPATIENT)
Dept: NURSING | Facility: CLINIC | Age: 64
End: 2019-03-19
Payer: COMMERCIAL

## 2019-03-19 DIAGNOSIS — Z79.01 LONG TERM CURRENT USE OF ANTICOAGULANT THERAPY: Primary | ICD-10-CM

## 2019-03-19 DIAGNOSIS — Z95.2 AORTIC VALVE PROSTHESIS PRESENT: ICD-10-CM

## 2019-03-19 DIAGNOSIS — I48.91 ATRIAL FIBRILLATION (H): ICD-10-CM

## 2019-03-19 LAB — INR POINT OF CARE: 2.8 (ref 0.86–1.14)

## 2019-03-19 PROCEDURE — 36416 COLLJ CAPILLARY BLOOD SPEC: CPT

## 2019-03-19 PROCEDURE — 99207 ZZC NO CHARGE NURSE ONLY: CPT

## 2019-03-19 PROCEDURE — 85610 PROTHROMBIN TIME: CPT | Mod: QW

## 2019-03-19 NOTE — TELEPHONE ENCOUNTER
PA Initiation    Medication: Myrbetriq- INITIATED  Insurance Company: [place] - Phone [phone] Fax [phone]  Pharmacy Filling the Rx: [id number] - [place] - [address]  Filling Pharmacy Phone: [phone]  Filling Pharmacy Fax:    Start Date: 3/19/2019

## 2019-03-19 NOTE — PROGRESS NOTES
ANTICOAGULATION FOLLOW-UP CLINIC VISIT    Patient Name:  Todd S Aschoff  Date:  3/19/2019  Contact Type:  Face to Face    SUBJECTIVE:     Patient Findings     Comments:   The patient was assessed for diet, medication,   activity level changes, missed or extra doses,   bruising or bleeding, with no problem findings.             OBJECTIVE    INR Protime   Date Value Ref Range Status   2019 2.8 (A) 0.86 - 1.14 Final       ASSESSMENT / PLAN  INR assessment THER    Recheck INR In: 4 WEEKS    INR Location Clinic      Anticoagulation Summary  As of 3/19/2019    INR goal:   2.5-3.5   TTR:   55.0 % (2.7 y)   INR used for dosin.8 (3/19/2019)   Warfarin maintenance plan:   7.5 mg (5 mg x 1.5) every Sun, Thu; 5 mg (5 mg x 1) all other days   Full warfarin instructions:   7.5 mg every Sun, Thu; 5 mg all other days   Weekly warfarin total:   40 mg   No change documented:   Nova John RN   Plan last modified:   Nova John RN (2018)   Next INR check:   2019   Priority:   INR   Target end date:       Indications    Aortic valve prosthesis present [Z95.2]  Atrial fibrillation (H) [I48.91]  Long term current use of anticoagulant therapy [Z79.01]             Anticoagulation Episode Summary     INR check location:       Preferred lab:       Send INR reminders to:   [facility]    Comments:   5mg tabs / CALENDAR      Anticoagulation Care Providers     Provider Role Specialty Phone number    Obdulio Ingram MD [facility] Internal Medicine [phone]            See the Encounter Report to view Anticoagulation Flowsheet and Dosing Calendar (Go to Encounters tab in chart review, and find the Anticoagulation Therapy Visit)        Nova John RN

## 2019-03-20 NOTE — TELEPHONE ENCOUNTER
Prior Authorization Approval    Authorization Effective Date: 3/12/2019  Authorization Expiration Date: 3/12/2020  Medication: Myrbetriq- APPROVED  Approved Dose/Quantity: 90 PER 90 DAYS  Reference #: [id number]   Insurance Company: [place] - Phone [phone] Fax [phone]  Expected CoPay:       CoPay Card Available:      Foundation Assistance Needed:    Which Pharmacy is filling the prescription (Not needed for infusion/clinic administered): [id number] - [place], [place] - [address] [place] [address]  Pharmacy Notified: Yes  Patient Notified: Yes

## 2019-04-16 ENCOUNTER — ANTICOAGULATION THERAPY VISIT (OUTPATIENT)
Dept: NURSING | Facility: CLINIC | Age: 64
End: 2019-04-16
Payer: COMMERCIAL

## 2019-04-16 DIAGNOSIS — Z95.2 AORTIC VALVE PROSTHESIS PRESENT: ICD-10-CM

## 2019-04-16 DIAGNOSIS — Z79.01 LONG TERM CURRENT USE OF ANTICOAGULANT THERAPY: Primary | ICD-10-CM

## 2019-04-16 DIAGNOSIS — I48.91 ATRIAL FIBRILLATION (H): ICD-10-CM

## 2019-04-16 LAB — INR POINT OF CARE: 2.7 (ref 0.86–1.14)

## 2019-04-16 PROCEDURE — 85610 PROTHROMBIN TIME: CPT | Mod: QW

## 2019-04-16 PROCEDURE — 36416 COLLJ CAPILLARY BLOOD SPEC: CPT

## 2019-04-16 NOTE — PROGRESS NOTES
ANTICOAGULATION FOLLOW-UP CLINIC VISIT    Patient Name:  Todd S Aschoff  Date:  2019  Contact Type:  Face to Face    SUBJECTIVE:     Patient Findings     Comments:   The patient was assessed for   diet, medication,   missed or extra doses,   bruising or bleeding,   with no problem findings.             OBJECTIVE    INR Protime   Date Value Ref Range Status   2019 2.7 (A) 0.86 - 1.14 Final       ASSESSMENT / PLAN  INR assessment THER    Recheck INR In: 4 WEEKS    INR Location Clinic      Anticoagulation Summary  As of 2019    INR goal:   2.5-3.5   TTR:   56.3 % (2.8 y)   INR used for dosin.7 (2019)   Warfarin maintenance plan:   7.5 mg (5 mg x 1.5) every Sun, Thu; 5 mg (5 mg x 1) all other days   Full warfarin instructions:   7.5 mg every Sun, Thu; 5 mg all other days   Weekly warfarin total:   40 mg   No change documented:   Nova John RN   Plan last modified:   Nova John RN (2018)   Next INR check:   2019   Priority:   INR   Target end date:       Indications    Aortic valve prosthesis present [Z95.2]  Atrial fibrillation (H) [I48.91]  Long term current use of anticoagulant therapy [Z79.01]             Anticoagulation Episode Summary     INR check location:       Preferred lab:       Send INR reminders to:   [facility]    Comments:   5mg tabs / CALENDAR      Anticoagulation Care Providers     Provider Role Specialty Phone number    Obdulio Ingram MD [facility] Internal Medicine [phone]            See the Encounter Report to view Anticoagulation Flowsheet and Dosing Calendar (Go to Encounters tab in chart review, and find the Anticoagulation Therapy Visit)        Nova John RN

## 2019-04-17 ENCOUNTER — TELEPHONE (OUTPATIENT)
Dept: INTERNAL MEDICINE | Facility: CLINIC | Age: 64
End: 2019-04-17

## 2019-04-17 DIAGNOSIS — Z79.899 CONTROLLED SUBSTANCE AGREEMENT SIGNED: ICD-10-CM

## 2019-04-17 DIAGNOSIS — M62.830 BACK MUSCLE SPASM: ICD-10-CM

## 2019-04-17 NOTE — TELEPHONE ENCOUNTER
Requested Prescriptions   Pending Prescriptions Disp Refills     diazepam (VALIUM) 5 MG tablet [Pharmacy Med Name: diazePAM Oral Tablet 5 MG] 270 tablet 0     Sig: TAKE 1 TABLET BY MOUTH EVERY 8 HOURS AS NEEDED FOR ANXIETY OR MUSCLE SPASM.       There is no refill protocol information for this order      Last Written Prescription Date:  01/11/2019  Last Fill Quantity: 270,  # refills: 0   Last office visit: 8/2/2018 with prescribing provider:     Future Office Visit:

## 2019-04-18 RX ORDER — DIAZEPAM 5 MG
TABLET ORAL
Qty: 270 TABLET | Refills: 0 | Status: SHIPPED | OUTPATIENT
Start: 2019-04-18 | End: 2019-06-29

## 2019-04-18 NOTE — TELEPHONE ENCOUNTER
[place] ([place] reviewed:  reviewed- no concerns  Diazepam last filled 1/12/19  Lisanaheedharish last filled 3/22/19     [place]:  [email or web address]    CSA on file 5/2/17  Amanda Wheeler RN

## 2019-04-25 DIAGNOSIS — M62.830 BACK MUSCLE SPASM: ICD-10-CM

## 2019-04-25 DIAGNOSIS — Z79.899 CONTROLLED SUBSTANCE AGREEMENT SIGNED: ICD-10-CM

## 2019-04-25 DIAGNOSIS — Z79.01 CURRENT USE OF LONG TERM ANTICOAGULATION: ICD-10-CM

## 2019-04-25 NOTE — TELEPHONE ENCOUNTER
Re-faxed prescription. Call to pharmacy and confirmed that they received fax. Call to patient to notify him that prescription was successfully sent to pharmacy.

## 2019-04-25 NOTE — TELEPHONE ENCOUNTER
"Requested Prescriptions   Pending Prescriptions Disp Refills     warfarin (COUMADIN) 5 MG tablet [Pharmacy Med Name: Warfarin Sodium Oral  Last Written Prescription Date:  1/23/2019  Last Fill Quantity: 110,  # refills: 0   Last office visit: 8/2/2018 with prescribing provider:     Future Office Visit:   Tablet 5 MG] 110 tablet 0     Sig: Take 1.5 tablets (7.5 mg) by mouth Sun, Tu, Th, Fri and take 1 tablet (5 mg) Mon, Wed, Sat or as instructed by INR Clinic.       Vitamin K Antagonists Failed - 4/25/2019  2:48 PM        Failed - INR is within goal in the past 6 weeks     Confirm INR is within goal in the past 6 weeks.     Recent Labs   Lab Test 04/16/19  1617   INR 2.7*                       Passed - Recent (12 mo) or future (30 days) visit within the authorizing provider's specialty     Patient had office visit in the last 12 months or has a visit in the next 30 days with authorizing provider or within the authorizing provider's specialty.  See \"Patient Info\" tab in inbasket, or \"Choose Columns\" in Meds & Orders section of the refill encounter.              Passed - Medication is active on med list        Passed - Patient is 18 years of age or older        "

## 2019-04-25 NOTE — TELEPHONE ENCOUNTER
Patient called stating that the pharmacy did not get the rx for diazapam.  It looks like it was a local print, but says the rx was faxed. He called the pharmacy, and they did not receive anything.

## 2019-04-26 RX ORDER — DIAZEPAM 5 MG
TABLET ORAL
Qty: 270 TABLET | Refills: 0 | OUTPATIENT
Start: 2019-04-26

## 2019-04-26 NOTE — TELEPHONE ENCOUNTER
Duplicate - Diazepam refilled on 4/18/19. Per 4/17/19 refill encounter this was resent on 4/25/19 and confirmed receipt with pharmacy.     Request denied.

## 2019-04-29 RX ORDER — WARFARIN SODIUM 5 MG/1
TABLET ORAL
Qty: 110 TABLET | Refills: 0 | Status: SHIPPED | OUTPATIENT
Start: 2019-04-29 | End: 2019-07-23

## 2019-04-29 NOTE — TELEPHONE ENCOUNTER
Prescription approved per [facility], [facility] or [facility] refill protocol.  Nanci ALICIA RN - Triage  [facility]

## 2019-04-30 ENCOUNTER — TRANSFERRED RECORDS (OUTPATIENT)
Dept: HEALTH INFORMATION MANAGEMENT | Facility: CLINIC | Age: 64
End: 2019-04-30

## 2019-05-14 ENCOUNTER — ANTICOAGULATION THERAPY VISIT (OUTPATIENT)
Dept: NURSING | Facility: CLINIC | Age: 64
End: 2019-05-14
Payer: COMMERCIAL

## 2019-05-14 DIAGNOSIS — I48.91 ATRIAL FIBRILLATION (H): ICD-10-CM

## 2019-05-14 DIAGNOSIS — Z79.01 LONG TERM CURRENT USE OF ANTICOAGULANT THERAPY: Primary | ICD-10-CM

## 2019-05-14 DIAGNOSIS — Z95.2 AORTIC VALVE PROSTHESIS PRESENT: ICD-10-CM

## 2019-05-14 LAB — INR POINT OF CARE: 3.2 (ref 0.86–1.14)

## 2019-05-14 PROCEDURE — 99207 ZZC NO CHARGE NURSE ONLY: CPT

## 2019-05-14 PROCEDURE — 85610 PROTHROMBIN TIME: CPT | Mod: QW

## 2019-05-14 PROCEDURE — 36416 COLLJ CAPILLARY BLOOD SPEC: CPT

## 2019-05-14 NOTE — PROGRESS NOTES
ANTICOAGULATION FOLLOW-UP CLINIC VISIT    Patient Name:  Todd S Aschoff  Date:  5/14/2019  Contact Type:  Face to Face    SUBJECTIVE:  Patient Findings     Positives:   Upcoming invasive procedure    Comments:   He will be having right knee surgery by   [facility] in the near future for torn meniscus.  It has not been scheduled yet.  Will need to hold warfarin and bridge with lovenox.    The patient was assessed for   diet, medication,   missed or extra doses,   bruising or bleeding,   with no problem findings.          Clinical Outcomes     Comments:   He will be having right knee surgery by   [facility] in the near future for torn meniscus.  It has not been scheduled yet.  Will need to hold warfarin and bridge with lovenox.    The patient was assessed for   diet, medication,   missed or extra doses,   bruising or bleeding,   with no problem findings.             OBJECTIVE    INR Protime   Date Value Ref Range Status   05/14/2019 3.2 (A) 0.86 - 1.14 Final       ASSESSMENT / PLAN  INR assessment THER    Recheck INR In: 4 WEEKS or sooner if having surgery   INR Location Clinic      Anticoagulation Summary  As of 5/14/2019    INR goal:   2.5-3.5   TTR:   57.4 % (2.9 y)   INR used for dosing:   3.2 (5/14/2019)   Warfarin maintenance plan:   7.5 mg (5 mg x 1.5) every Sun, Thu; 5 mg (5 mg x 1) all other days   Full warfarin instructions:   7.5 mg every Sun, Thu; 5 mg all other days   Weekly warfarin total:   40 mg   No change documented:   Nova John RN   Plan last modified:   Nova John RN (12/27/2018)   Next INR check:   6/11/2019   Priority:   INR   Target end date:       Indications    Aortic valve prosthesis present [Z95.2]  Atrial fibrillation (H) [I48.91]  Long term current use of anticoagulant therapy [Z79.01]             Anticoagulation Episode Summary     INR check location:       Preferred lab:       Send INR reminders to:   [facility]    Comments:   5mg tabs / CALENDAR       Anticoagulation Care Providers     Provider Role Specialty Phone number    Obdulio Ingram MD Responsible Internal Medicine [phone]            See the Encounter Report to view Anticoagulation Flowsheet and Dosing Calendar (Go to Encounters tab in chart review, and find the Anticoagulation Therapy Visit)    INR is therapeutic today. Patient will continue same maintenance dose.   Follow up in 4 weeks or sooner if needed.        Nova John RN

## 2019-05-16 DIAGNOSIS — G89.29 CHRONIC LOW BACK PAIN: ICD-10-CM

## 2019-05-16 DIAGNOSIS — M54.50 CHRONIC LOW BACK PAIN: ICD-10-CM

## 2019-05-16 RX ORDER — ACETAMINOPHEN 650 MG/1
TABLET, FILM COATED, EXTENDED RELEASE ORAL
Qty: 180 TABLET | Refills: 0 | Status: SHIPPED | OUTPATIENT
Start: 2019-05-16 | End: 2019-08-27

## 2019-05-16 NOTE — TELEPHONE ENCOUNTER
"Requested Prescriptions   Pending Prescriptions Disp Refills     MAPAP ARTHRITIS PAIN 650 MG CR tablet [Pharmacy Med Name: Mapap Arthritis Pain Oral Tablet Extended Release 650 MG] 180 tablet 0     Sig: TAKE ONE TABLET BY MOUTH TWICE DAILY   Last Written Prescription Date:  11/07/2018  Last Fill Quantity: 180,  # refills: 1   Last office visit: 8/2/2018 with prescribing provider:     Future Office Visit:   Next 5 appointments (look out 90 days)    May 20, 2019 11:00 AM CDT  [facility] [address]  [facility] [address]  [phone]           Analgesics (Non-Narcotic Tylenol and ASA Only) Passed - 5/16/2019  1:12 PM        Passed - Recent (12 mo) or future (30 days) visit within the authorizing provider's specialty     Patient had office visit in the last 12 months or has a visit in the next 30 days with authorizing provider or within the authorizing provider's specialty.  See \"Patient Info\" tab in inbasket, or \"Choose Columns\" in Meds & Orders section of the refill encounter.              Passed - Patient is 7 months old or older     If patient is a peds patient of the age 7 mos -12 years, ok to refill using weight-based dosing.     If >3g daily and/or sig is not \"prn\", check for liver enzymes. If normal in the last year, ok to refill.  If not, refer to the provider.          Passed - Medication is active on med list        "

## 2019-05-20 ENCOUNTER — OFFICE VISIT (OUTPATIENT)
Dept: INTERNAL MEDICINE | Facility: CLINIC | Age: 64
End: 2019-05-20
Payer: COMMERCIAL

## 2019-05-20 VITALS
BODY MASS INDEX: 41.75 KG/M2 | HEIGHT: 73 IN | TEMPERATURE: 97.5 F | SYSTOLIC BLOOD PRESSURE: 144 MMHG | WEIGHT: 315 LBS | DIASTOLIC BLOOD PRESSURE: 92 MMHG | HEART RATE: 73 BPM | RESPIRATION RATE: 14 BRPM | OXYGEN SATURATION: 95 %

## 2019-05-20 DIAGNOSIS — Z79.01 LONG TERM CURRENT USE OF ANTICOAGULANT THERAPY: ICD-10-CM

## 2019-05-20 DIAGNOSIS — M23.91 INTERNAL DERANGEMENT OF RIGHT KNEE: ICD-10-CM

## 2019-05-20 DIAGNOSIS — Z01.818 PREOP GENERAL PHYSICAL EXAM: Primary | ICD-10-CM

## 2019-05-20 DIAGNOSIS — Z95.2 AORTIC VALVE PROSTHESIS PRESENT: ICD-10-CM

## 2019-05-20 LAB
ERYTHROCYTE [DISTWIDTH] IN BLOOD BY AUTOMATED COUNT: 13 % (ref 10–15)
HCT VFR BLD AUTO: 48 % (ref 40–53)
HGB BLD-MCNC: 16.6 G/DL (ref 13.3–17.7)
MCH RBC QN AUTO: 32.4 PG (ref 26.5–33)
MCHC RBC AUTO-ENTMCNC: 34.6 G/DL (ref 31.5–36.5)
MCV RBC AUTO: 94 FL (ref 78–100)
PLATELET # BLD AUTO: 228 10E9/L (ref 150–450)
RBC # BLD AUTO: 5.13 10E12/L (ref 4.4–5.9)
WBC # BLD AUTO: 6.4 10E9/L (ref 4–11)

## 2019-05-20 PROCEDURE — 80048 BASIC METABOLIC PNL TOTAL CA: CPT | Performed by: FAMILY MEDICINE

## 2019-05-20 PROCEDURE — 36415 COLL VENOUS BLD VENIPUNCTURE: CPT | Performed by: FAMILY MEDICINE

## 2019-05-20 PROCEDURE — 85027 COMPLETE CBC AUTOMATED: CPT | Performed by: FAMILY MEDICINE

## 2019-05-20 PROCEDURE — 99214 OFFICE O/P EST MOD 30 MIN: CPT | Performed by: FAMILY MEDICINE

## 2019-05-20 PROCEDURE — 93000 ELECTROCARDIOGRAM COMPLETE: CPT | Performed by: FAMILY MEDICINE

## 2019-05-20 ASSESSMENT — MIFFLIN-ST. JEOR: SCORE: 2289.51

## 2019-05-20 NOTE — PROGRESS NOTES
[place] [address]  [facility] [address]  [phone]  Dept: [phone]    PRE-OP EVALUATION:  Today's date: 2019    Todd S Aschoff (: 1955) presents for pre-operative evaluation assessment as requested by Dr. Alirio Isaac requires evaluation and anesthesia risk assessment prior to undergoing surgery/procedure for treatment of internal derangement R knee.    Fax number for surgical facility: [facility], fax [phone].  Primary Physician: Obdulio Ingram  Type of Anesthesia Anticipated: General    Patient has a Health Care Directive or Living Will:  NO    Preop Questions 2019   Who is doing your surgery? [facility]   What are you having done? knee surgery   Date of Surgery/Procedure: may 28   1.  Do you have a history of Heart attack, stroke, stent, coronary bypass surgery, or other heart surgery? YES  - aortic valve.   2.  Do you ever have any pain or discomfort in your chest? No   3.  Do you have a history of  Heart Failure? No   4.   Are you troubled by shortness of breath when:  walking on a level surface, or up a slight hill, or at night? No   5.  Do you currently have a cold, bronchitis or other respiratory infection? No   6.  Do you have a cough, shortness of breath, or wheezing? No   7.  Do you sometimes get pains in the calves of your legs when you walk? No   8. Do you or anyone in your family have previous history of blood clots? No   9.  Do you or does anyone in your family have a serious bleeding problem such as prolonged bleeding following surgeries or cuts? YES - due to warfarin   10. Have you ever had problems with anemia or been told to take iron pills? No   11. Have you had any abnormal blood loss such as black, tarry or bloody stools? No   12. Have you ever had a blood transfusion? No   13. Have you or any of your relatives ever had problems with anesthesia? No   14. Do you have sleep apnea, excessive snoring or  daytime drowsiness? No   15. Do you have any prosthetic heart valves? YES - aortic valve,   16. Do you have prosthetic joints? YES - hips         HPI:     HPI related to upcoming procedure:     Per patient he has a meniscus tear in R knee. Planning arthroscopy.    He has a mechanical aortic valve.      No h/o Anesthesia complications.    MEDICAL HISTORY:     Patient Active Problem List    Diagnosis Date Noted     Alcohol dependence in remission (H) 08/02/2018     Priority: Medium     Obesity, BMI >35 with comorbidities 04/28/2017     Priority: Medium     Bilateral thoracic back pain 11/16/2016     Priority: Medium     Long-term (current) use of anticoagulants [Z79.01] 06/15/2016     Priority: Medium     Overactive bladder 05/11/2015     Priority: Medium     Memory difficulties 01/16/2015     Priority: Medium     Lumbar surgical wound fluid collection 05/23/2013     Priority: Medium     History of dissecting thoracic aneurysm repair 04/22/2013     Priority: Medium     Back pain 04/16/2013     Priority: Medium     Lumbar radiculopathy 04/03/2013     Priority: Medium     Obesity 03/25/2013     Priority: Medium     Major depressive disorder, recurrent episode, in full remission (H) 10/30/2012     Priority: Medium     Tourette syndrome 10/30/2012     Priority: Medium     Health Care Home 04/03/2012     Priority: Medium     EMERGENCY CARE PLAN  Presenting Problem Signs and Symptoms Treatment Plan    Questions or conerns during clinic hours    I will call the clinic directly     Questions or conerns outside clinic hours    I will call the 24 hour nurse line at [phone]    Patient needs to schedule an appointment    I will call the 24 hour scheduling team at [phone] or clinic directly    Same day treatment     I will call the clinic first, nurse line if after hours, urgent care and express care if needed                          DX V65.8 REPLACED WITH [id number] [facility]ALTH CARE [place] (04/08/2013)       Advanced directives,  counseling/discussion 01/06/2012     Priority: Medium     Discussed Advance Directive planning with patient; information given to patient to review.         Chronic low back pain 08/19/2011     Priority: Medium     BPH (benign prostatic hypertrophy)      Priority: Medium     HYPERLIPIDEMIA LDL GOAL <130 10/31/2010     Priority: Medium     Aortic valve prosthesis present      Priority: Medium     Atrial fibrillation (H) 08/07/2002     Priority: Medium     Hypothyroidism 08/07/2002     Priority: Medium     Problem list name updated by automated process. Provider to review       Long term current use of anticoagulant therapy 08/07/2002     Priority: Medium     Problem list name updated by automated process. Provider to review        Past Medical History:   Diagnosis Date     Alcohol dependence (H)      Allergy, unspecified not elsewhere classified     seasonal     Antiplatelet or antithrombotic long-term use     coumadin/lovenox     Aortic valve prosthesis present      Atrial fibrillation (H)      Blood transfusion     after heart surg 1992     BPH (benign prostatic hypertrophy)      Chronic infection     low back wound incision not healing      Chronic pain     lower back and right leg and left leg     Coagulation disorder (H)     on blood thinners     Dissection of aorta, thoracic (H) 1992    St Jose F aotic valve + arch graft 1992     Headache(784.0)      Heart murmur     aortic valve replaced and arch     History of spinal cord injury      Low back pain      Major depression      Mixed hyperlipidemia      Numbness and tingling     right leg post surg rightt hip/ also left leg since surg     OA (osteoarthritis)     hips     Obesity, unspecified      Prostate infection      Sciatica 2002    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 Jose F's valve     C NONSPECIFIC PROCEDURE  1992    repair of  TAA with graft     C NONSPECIFIC PROCEDURE  2002    R hip replacement comp's by nerve injury with pain down into R leg, nadya below knee     C TOTAL HIP ARTHROPLASTY  2010    Left AMANDA     CATARACT IOL, RT/LT      rt eye only     CHOLECYSTECTOMY, LAPOROSCOPIC  8/10     COLONOSCOPY  1/15/2015    Dr. Kothari [facility]     COLONOSCOPY N/A 1/15/2015    Procedure: COLONOSCOPY;  Surgeon: Johnson Kothari MD;  Location: [facility]     DECOMPRESSION LUMBAR ONE LEVEL  4/3/2013    Procedure: DECOMPRESSION LUMBAR ONE LEVEL;  Open Decompression L3-4 bilateral;  Surgeon: Travis Coffey MD;  Location: [facility]     DECOMPRESSION, FUSION CERVICAL ANTERIOR ONE LEVEL, COMBINED  3/23/2012    Procedure:COMBINED DECOMPRESSION, FUSION CERVICAL ANTERIOR ONE LEVEL; Anterior Cervical Decompression and Fusion C4-6; Surgeon:TRAVIS COFFEY; Location:[facility]     EXPLORE SPINE, REMOVE HARDWARE, COMBINED  5/23/2013    Procedure: COMBINED EXPLORE SPINE, REMOVE HARDWARE;  Exploration Lumbar Wound for fluid collection;  Surgeon: Travis Coffey MD;  Location: [facility]     FUSION CERVICAL ANTERIOR TWO LEVELS  3/26/2012    Procedure:FUSION CERVICAL ANTERIOR TWO LEVELS; Anterior Cervical Fusion C4-6, Anterior Cervical  Hematoma Evacuation; Surgeon:TRAVIS COFFEY; Location:[facility]     IRRIGATION AND DEBRIDEMENT SPINE, CLOSE WOUND, COMBINED  3/26/2012    Procedure:COMBINED IRRIGATION AND DEBRIDEMENT SPINE, CLOSE WOUND; Surgeon:TRAVIS COFFEY; Location:[facility] OR     removal of cyst of back   2.5week     TONSILLECTOMY       wisdom teeth[       Current Outpatient Medications   Medication Sig Dispense Refill     Acetaminophen (ACETAMINOPHEN 8 HOUR) 650 MG TABS Take 650 mg by mouth 2 times daily 250 tablet 3     azelastine (ASTELIN) 0.1 % spray USE 2 SPRAYS IN NOSTRIL 2 TIMES DAILY AS NEEDED. 30 mL 11     cetirizine (ZYRTEC) 10 MG tablet TAKE ONE TABLET BY MOUTH ONCE DAILY AS NEEDED  90 tablet 0     cyclobenzaprine (FLEXERIL) 10 MG tablet Take 1 tablet  (10 mg) by mouth 3 times daily as needed for muscle spasms 270 tablet 3     diazepam (VALIUM) 5 MG tablet TAKE 1 TABLET BY MOUTH EVERY 8 HOURS AS NEEDED FOR ANXIETY OR MUSCLE SPASM. 270 tablet 0     donepezil (ARICEPT) 10 MG tablet Take 1 tablet (10 mg) by mouth At Bedtime 90 tablet 3     enoxaparin (LOVENOX) 150 MG/ML injection Inject 0.8 mLs (120 mg) Subcutaneous every 12 hours 10 Syringe 1     guanFACINE (TENEX) 1 MG tablet Take 1 tablet (1 mg) by mouth At Bedtime 90 tablet 3     isometheptene-acetaminophen-dichloralphenazone (MIDRIN) -100 MG per capsule Take 1 capsule by mouth 4 times daily as needed 30 capsule 0     levothyroxine (SYNTHROID/LEVOTHROID) 150 MCG tablet Take 1 tablet (150 mcg) by mouth daily 90 tablet 3     MAPAP ARTHRITIS PAIN 650 MG CR tablet TAKE ONE TABLET BY MOUTH TWICE DAILY  180 tablet 0     mirabegron (MYRBETRIQ) 50 MG 24 hr tablet Take 1 tablet (50 mg) by mouth daily 90 tablet 3     nicotine polacrilex (COMMIT) 2 MG lozenge Place 1 lozenge (2 mg) inside cheek every hour as needed for smoking cessation 360 lozenge 1     pregabalin (LYRICA) 100 MG capsule Take 1 capsule (100 mg) by mouth 3 times daily Do not take if sleepy/sedated. 270 capsule 3     propafenone (RYTHMOL) 150 MG TABS tablet Take 1 tablet (150 mg) by mouth every 8 hours 270 tablet 3     senna-docusate (SENOKOT-S;PERICOLACE) 8.6-50 MG per tablet Take 1 tablet by mouth 2 times daily as needed for constipation 60 tablet 11     simvastatin (ZOCOR) 40 MG tablet Take 1 tablet (40 mg) by mouth At Bedtime 90 tablet 3     traZODone (DESYREL) 50 MG tablet Take 1 tablet (50 mg) by mouth At Bedtime 90 tablet 3     venlafaxine (EFFEXOR-ER) 150 MG TB24 24 hr tablet Take 1 tablet (150 mg) by mouth daily (with breakfast) 90 each 3     warfarin (COUMADIN) 5 MG tablet Take 1.5 tablets (7.5 mg) by mouth Sun, Tu, Th, Fri and take 1 tablet (5 mg) Mon, Wed, Sat or as instructed by INR Clinic. 110 tablet 0     OTC products: no recent use of  "OTC ASA, NSAIDS or Steroids    Allergies   Allergen Reactions     No Known Allergies       Latex Allergy: NO    Social History     Tobacco Use     Smoking status: Never Smoker     Smokeless tobacco: Never Used   Substance Use Topics     Alcohol use: No     Comment: Stopped drinking alcohol ~2009     History   Drug Use No       REVIEW OF SYSTEMS:   Constitutional, neuro, ENT, endocrine, pulmonary, cardiac, gastrointestinal, genitourinary, musculoskeletal, integument and psychiatric systems are negative, except as otherwise noted.    EXAM:   BP (!) 144/92 (BP Location: Right arm, Patient Position: Sitting, Cuff Size: Adult Large)   Pulse 73   Temp 97.5  F (36.4  C) (Oral)   Resp 14   Ht 1.854 m (6' 1\")   Wt 143.6 kg (316 lb 8 oz)   SpO2 95%   BMI 41.76 kg/m      GENERAL APPEARANCE: healthy, alert and no distress     EYES: EOMI,  PERRL     HENT:  and mouth without ulcers or lesions     NECK: no adenopathy, no asymmetry, masses, or scars and thyroid normal to palpation     RESP: lungs clear to auscultation - no rales, rhonchi or wheezes     CV: RSR, mechanical valve sounds, 2/6 PRAVEEN.     ABDOMEN:  soft, nontender, no HSM or masses      MS:  Knee ROM is WNL     SKIN: no suspicious lesions or rashes     NEURO: Normal strength and tone, sensory exam grossly normal, mentation intact and speech normal     PSYCH: mentation appears minimally impaired. and affect normal/bright     LYMPHATICS: No cervical adenopathy    DIAGNOSTICS:       EKG:  NSR, rate 66  Conduction normal, axis -29  ST segments and T waves normal  No significant Q waves, no ectopy.  Stable since 2-      Results for orders placed or performed in visit on 05/20/19   Basic metabolic panel  (Ca, Cl, CO2, Creat, Gluc, K, Na, BUN)   Result Value Ref Range    Sodium 141 133 - 144 mmol/L    Potassium 4.2 3.4 - 5.3 mmol/L    Chloride 106 94 - 109 mmol/L    Carbon Dioxide 26 20 - 32 mmol/L    Anion Gap 9 3 - 14 mmol/L    Glucose 82 70 - 99 mg/dL    Urea " Nitrogen 16 7 - 30 mg/dL    Creatinine 1.06 0.66 - 1.25 mg/dL    GFR Estimate 75 >60 mL/min/[1.73_m2]    GFR Estimate If Black 86 >60 mL/min/[1.73_m2]    Calcium 9.5 8.5 - 10.1 mg/dL   CBC with platelets   Result Value Ref Range    WBC 6.4 4.0 - 11.0 10e9/L    RBC Count 5.13 4.4 - 5.9 10e12/L    Hemoglobin 16.6 13.3 - 17.7 g/dL    Hematocrit 48.0 40.0 - 53.0 %    MCV 94 78 - 100 fl    MCH 32.4 26.5 - 33.0 pg    MCHC 34.6 31.5 - 36.5 g/dL    RDW 13.0 10.0 - 15.0 %    Platelet Count 228 150 - 450 10e9/L     *Note: Due to a large number of results and/or encounters for the requested time period, some results have not been displayed. A complete set of results can be found in Results Review.             Recent Labs   Lab Test 05/14/19  1604 04/16/19  1617  08/02/18  1721  02/06/18  1656 02/04/18  1418  12/14/17  1501  01/13/12  1525  07/29/11  1130   HGB  --   --   --  16.1  --  15.3 15.8  --  16.7   < >  --   --   --    PLT  --   --   --  205  --   --  187  --  209   < >  --   --   --    INR 3.2* 2.7*   < >  --    < > 2.68* 2.59*   < >  --    < >  --    < >  --    NA  --   --   --  142  --   --   --   --  139   < >  --    < >  --    POTASSIUM  --   --   --  4.4  --   --   --   --  4.4   < >  --    < >  --    CR  --   --   --  1.06  --   --   --   --  0.98   < >  --    < >  --    A1C  --   --   --   --   --   --   --   --   --   --  5.6  --  5.6    < > = values in this interval not displayed.        IMPRESSION:   Diagnosis/reason for consult:       (Z01.818) Preop general physical exam  (primary encounter diagnosis)  Comment: satis  Plan: EKG 12-lead complete w/read - Clinics, Basic         metabolic panel  (Ca, Cl, CO2, Creat, Gluc, K,         Na, BUN), CBC with platelets            (M23.91) Internal derangement of right knee  Comment:   Plan:     (Z95.2) Aortic valve prosthesis present  Comment:   Plan: enoxaparin (LOVENOX) 150 MG/ML syringe            (Z79.01) Long term current use of anticoagulant therapy  Comment:    Plan: enoxaparin (LOVENOX) 150 MG/ML syringe                The proposed surgical procedure is considered LOW risk.    REVISED CARDIAC RISK INDEX  The patient has the following serious cardiovascular risks for perioperative complications such as (MI, PE, VFib and 3  AV Block):  No serious cardiac risks  INTERPRETATION: 1 risks: Class II (low risk - 0.9% complication rate)    The patient has the following additional risks for perioperative complications:  Prosthetic aortic valve.      ICD-10-CM    1. Preop general physical exam Z01.818 EKG 12-lead complete w/read - Clinics     Basic metabolic panel  (Ca, Cl, CO2, Creat, Gluc, K, Na, BUN)     CBC with platelets   2. Internal derangement of right knee M23.91    3. Aortic valve prosthesis present Z95.2 enoxaparin (LOVENOX) 150 MG/ML syringe   4. Long term current use of anticoagulant therapy Z79.01 enoxaparin (LOVENOX) 150 MG/ML syringe       RECOMMENDATIONS:       Lovenox bridging schedule provided including Warfarin schedule.    Other meds morning of surgery.      APPROVAL GIVEN to proceed with proposed procedure, without further diagnostic evaluation       Signed Electronically by: Lucas Garza MD    Copy of this evaluation report is provided to requesting physician.    [facility] Preop Guidelines    Revised Cardiac Risk Index

## 2019-05-21 LAB
ANION GAP SERPL CALCULATED.3IONS-SCNC: 9 MMOL/L (ref 3–14)
BUN SERPL-MCNC: 16 MG/DL (ref 7–30)
CALCIUM SERPL-MCNC: 9.5 MG/DL (ref 8.5–10.1)
CHLORIDE SERPL-SCNC: 106 MMOL/L (ref 94–109)
CO2 SERPL-SCNC: 26 MMOL/L (ref 20–32)
CREAT SERPL-MCNC: 1.06 MG/DL (ref 0.66–1.25)
GFR SERPL CREATININE-BSD FRML MDRD: 75 ML/MIN/{1.73_M2}
GLUCOSE SERPL-MCNC: 82 MG/DL (ref 70–99)
POTASSIUM SERPL-SCNC: 4.2 MMOL/L (ref 3.4–5.3)
SODIUM SERPL-SCNC: 141 MMOL/L (ref 133–144)

## 2019-05-28 ENCOUNTER — TRANSFERRED RECORDS (OUTPATIENT)
Dept: HEALTH INFORMATION MANAGEMENT | Facility: CLINIC | Age: 64
End: 2019-05-28

## 2019-06-06 ENCOUNTER — ANTICOAGULATION THERAPY VISIT (OUTPATIENT)
Dept: NURSING | Facility: CLINIC | Age: 64
End: 2019-06-06
Payer: COMMERCIAL

## 2019-06-06 DIAGNOSIS — Z95.2 AORTIC VALVE PROSTHESIS PRESENT: ICD-10-CM

## 2019-06-06 DIAGNOSIS — I48.91 ATRIAL FIBRILLATION (H): ICD-10-CM

## 2019-06-06 DIAGNOSIS — Z79.01 LONG TERM CURRENT USE OF ANTICOAGULANT THERAPY: Primary | ICD-10-CM

## 2019-06-06 LAB — INR POINT OF CARE: 2.2 (ref 0.86–1.14)

## 2019-06-06 PROCEDURE — 85610 PROTHROMBIN TIME: CPT | Mod: QW

## 2019-06-06 PROCEDURE — 36416 COLLJ CAPILLARY BLOOD SPEC: CPT

## 2019-06-06 NOTE — PROGRESS NOTES
"ANTICOAGULATION FOLLOW-UP CLINIC VISIT    Patient Name:  Todd S Aschoff  Date:  2019  Contact Type:  Face to Face    SUBJECTIVE:  Patient Findings     Positives:   Missed doses    Comments:   Patient had a medial meniscus tear (right knee).  Arthroscopic surgery was done 19 by [facility].  Pre-op physical was done by Lucas Garza MD.  From visit note:  \"Lovenox bridging schedule provided including Warfarin schedule.\"    Lovenox and warfarin instructions not seen in EMR.  Patient says he was told to stop warfarin 5 days before surgery and take lovenox until 19.             Clinical Outcomes     Comments:   Patient had a medial meniscus tear (right knee).  Arthroscopic surgery was done 19 by [facility].  Pre-op physical was done by Lucas Garza MD.  From visit note:  \"Lovenox bridging schedule provided including Warfarin schedule.\"    Lovenox and warfarin instructions not seen in EMR.  Patient says he was told to stop warfarin 5 days before surgery and take lovenox until 19.                OBJECTIVE    INR Protime   Date Value Ref Range Status   2019 2.2 (A) 0.86 - 1.14 Final       ASSESSMENT / PLAN  INR assessment SUB    Recheck INR In: 2 WEEKS    INR Location Clinic      Anticoagulation Summary  As of 2019    INR goal:   2.5-3.5   TTR:   57.7 % (2.9 y)   INR used for dosin.2! (2019)   Warfarin maintenance plan:   7.5 mg (5 mg x 1.5) every Sun, Thu; 5 mg (5 mg x 1) all other days   Full warfarin instructions:   : 7.5 mg; : 7.5 mg; Otherwise 7.5 mg every Sun, Thu; 5 mg all other days   Weekly warfarin total:   40 mg   Plan last modified:   Nova John RN (2018)   Next INR check:   2019   Priority:   INR   Target end date:       Indications    Aortic valve prosthesis present [Z95.2]  Atrial fibrillation (H) [I48.91]  Long term current use of anticoagulant therapy [Z79.01]             Anticoagulation Episode Summary     INR " check location:       Preferred lab:       Send INR reminders to:   [facility]    Comments:   5mg tabs / CALENDAR      Anticoagulation Care Providers     Provider Role Specialty Phone number    Obdulio Ingram MD Responsible Internal Medicine [phone]            See the Encounter Report to view Anticoagulation Flowsheet and Dosing Calendar (Go to Encounters tab in chart review, and find the Anticoagulation Therapy Visit)    INR is sub therapeutic today.  Patient will take 7.5mg for the next 4 days which will increase weekly total by 12.5%, then resume maintenance dose.   Follow up in 2 weeks or sooner if needed.     Dosage adjustment made based on physician directed care plan.      Nova John RN

## 2019-06-07 ENCOUNTER — TRANSFERRED RECORDS (OUTPATIENT)
Dept: HEALTH INFORMATION MANAGEMENT | Facility: CLINIC | Age: 64
End: 2019-06-07

## 2019-06-08 ENCOUNTER — TRANSFERRED RECORDS (OUTPATIENT)
Dept: HEALTH INFORMATION MANAGEMENT | Facility: CLINIC | Age: 64
End: 2019-06-08

## 2019-06-13 ENCOUNTER — TELEPHONE (OUTPATIENT)
Dept: INTERNAL MEDICINE | Facility: CLINIC | Age: 64
End: 2019-06-13

## 2019-06-13 NOTE — TELEPHONE ENCOUNTER
Prior Authorization Retail Medication Request    Medication/Dose:   ICD code (if different than what is on RX):  M54.40 and G89.29  Previously Tried and Failed:   Rationale:  Back pain, Lumbar radiculopathy    Insurance Name:  [employer]  Insurance ID:  [id number]      Pharmacy Information (if different than what is on RX)  Name:  [facility]   Phone:  [phone].

## 2019-06-17 NOTE — TELEPHONE ENCOUNTER
PA Initiation    Medication: Lyrica 100 mg   Insurance Company: [employer] - Phone [phone] Fax [phone]  Pharmacy Filling the Rx: [id number] - [place], [place] - [address]  Filling Pharmacy Phone: [phone]  Filling Pharmacy Fax:    Start Date: 6/17/2019    Central Prior Authorization Team   Phone: [phone]

## 2019-06-19 ENCOUNTER — ANTICOAGULATION THERAPY VISIT (OUTPATIENT)
Dept: ANTICOAGULATION | Facility: CLINIC | Age: 64
End: 2019-06-19
Payer: COMMERCIAL

## 2019-06-19 DIAGNOSIS — I48.91 ATRIAL FIBRILLATION (H): ICD-10-CM

## 2019-06-19 DIAGNOSIS — Z95.2 AORTIC VALVE PROSTHESIS PRESENT: ICD-10-CM

## 2019-06-19 DIAGNOSIS — Z79.01 LONG TERM CURRENT USE OF ANTICOAGULANT THERAPY: ICD-10-CM

## 2019-06-19 LAB — INR POINT OF CARE: 2.9 (ref 0.86–1.14)

## 2019-06-19 PROCEDURE — 99207 ZZC NO CHARGE NURSE ONLY: CPT

## 2019-06-19 PROCEDURE — 36416 COLLJ CAPILLARY BLOOD SPEC: CPT

## 2019-06-19 PROCEDURE — 85610 PROTHROMBIN TIME: CPT | Mod: QW

## 2019-06-19 NOTE — PROGRESS NOTES
ANTICOAGULATION FOLLOW-UP CLINIC VISIT    Patient Name:  Todd S Aschoff  Date:  2019  Contact Type:  Face to Face    SUBJECTIVE:  Patient Findings     Positives:   Change in activity (continues to have knee pain which has limited some of his usual routine), Extra doses (Patient states that he took 10 mg for 4 days after last visit.  Previous documentation stated to take 7.5 mg for 4 days.), Change in medications (Levaquin for UTI.  Last dose was yesterday.  Levaquin can raise INR)    Comments:   The patient was assessed for diet, missed doses, bruising or bleeding, with no problem findings.          Clinical Outcomes     Negatives:   Major bleeding event, Thromboembolic event, Anticoagulation-related hospital admission, Anticoagulation-related ED visit, Anticoagulation-related fatality    Comments:   The patient was assessed for diet, missed doses, bruising or bleeding, with no problem findings.             OBJECTIVE    INR Protime   Date Value Ref Range Status   2019 2.9 (A) 0.86 - 1.14 Final       ASSESSMENT / PLAN  INR assessment THER    Recheck INR In: 3 WEEKS    INR Location Clinic      Anticoagulation Summary  As of 2019    INR goal:   2.5-3.5   TTR:   57.7 % (3 y)   INR used for dosin.9 (2019)   Warfarin maintenance plan:   7.5 mg (5 mg x 1.5) every Sun, Thu; 5 mg (5 mg x 1) all other days   Full warfarin instructions:   7.5 mg every Sun, Thu; 5 mg all other days   Weekly warfarin total:   40 mg   No change documented:   Amanda Wheeler RN   Plan last modified:   Nova John RN (2018)   Next INR check:   2019   Priority:   INR   Target end date:       Indications    Aortic valve prosthesis present [Z95.2]  Atrial fibrillation (H) [I48.91]  Long term current use of anticoagulant therapy [Z79.01]             Anticoagulation Episode Summary     INR check location:       Preferred lab:       Send INR reminders to:   [facility]    Comments:   5mg tabs / CALENDAR       Anticoagulation Care Providers     Provider Role Specialty Phone number    Obdulio Ingram MD Responsible Internal Medicine [phone]            See the Encounter Report to view Anticoagulation Flowsheet and Dosing Calendar (Go to Encounters tab in chart review, and find the Anticoagulation Therapy Visit)    Dosage adjustment made based on physician directed care plan.    Amanda Wheeler RN

## 2019-06-20 NOTE — TELEPHONE ENCOUNTER
Called Patient s insurance at [phone] and per representative they did receive the request and it is still currently under review. Their turnaround time is 4-5 days but can be up to 10 days but once a decision is made they will send us a fax and mail out a letter to the patient with the outcome as well.

## 2019-06-25 NOTE — TELEPHONE ENCOUNTER
PRIOR AUTHORIZATION DENIED    Medication: Lyrica 100 mg     Denial Date: 6/24/2019    Denial Rational: Denied as patient doesn ot have an FDa approved diagnosis for this medcation and has to try and fail 3 formulary alternatives depending on the diagnosis.         Appeal Information:

## 2019-06-25 NOTE — TELEPHONE ENCOUNTER
"Routed to provider to review if appeal should completed or alternative tried.     Patient has tried Gabapentin in the past - caused him to feel \"mentally foggy.\"  "

## 2019-06-29 DIAGNOSIS — Z79.899 CONTROLLED SUBSTANCE AGREEMENT SIGNED: ICD-10-CM

## 2019-06-29 DIAGNOSIS — M62.830 BACK MUSCLE SPASM: ICD-10-CM

## 2019-07-01 NOTE — TELEPHONE ENCOUNTER
Requested Prescriptions   Pending Prescriptions Disp Refills     diazepam (VALIUM) 5 MG tablet [Pharmacy Med Name: diazePAM Oral Tablet 5 MG] 270 tablet 0     Sig: TAKE 1 TABLET BY MOUTH EVERY 8 HOURS AS NEEDED FOR ANXIETY OR MUSCLE SPASM       There is no refill protocol information for this order      Last Written Prescription Date:  04/18/2019  Last Fill Quantity: 270,  # refills: 0   Last office visit: 5/20/2019 with prescribing provider:     Future Office Visit:

## 2019-07-01 NOTE — TELEPHONE ENCOUNTER
Controlled Substance Refill Request for Valium  Problem List Complete:  No     PROVIDER TO CONSIDER COMPLETION OF PROBLEM LIST AND OVERVIEW/CONTROLLED SUBSTANCE AGREEMENT    Last Written Prescription Date:  4/18/19  Last Fill Quantity: 270,   # refills: 0    THE MOST RECENT OFFICE VISIT MUST BE WITHIN THE PAST 3 MONTHS. AT LEAST ONE FACE TO FACE VISIT MUST OCCUR EVERY 6 MONTHS. ADDITIONAL VISITS CAN BE VIRTUAL.    Last Office Visit with [facility] primary care provider: 5/20/19    Future Office visit:     Controlled substance agreement:   Encounter-Level CSA - 04/28/2017:    Controlled Substance Agreement - Scan on 5/2/2017  4:41 PM: CONTROLLED SUBSTANCE AGREEMENT (below)       Patient-Level CSA:    There are no patient-level csa.         Last Urine Drug Screen: No results found for: CDAUT, No results found for: COMDAT, No results found for: THC13, PCP13, COC13, MAMP13, OPI13, AMP13, BZO13, TCA13, MTD13, BAR13, OXY13, PPX13, BUP13     Processing:  Fax Rx to [facility] pharmacy     [place] reviewed:  reviewed- no concerns 7/1/19  [place] profile:  [email or web address]

## 2019-07-03 RX ORDER — DIAZEPAM 5 MG
TABLET ORAL
Qty: 270 TABLET | Refills: 0 | Status: SHIPPED | OUTPATIENT
Start: 2019-07-03 | End: 2019-10-29

## 2019-07-08 ENCOUNTER — TRANSFERRED RECORDS (OUTPATIENT)
Dept: HEALTH INFORMATION MANAGEMENT | Facility: CLINIC | Age: 64
End: 2019-07-08

## 2019-07-09 ENCOUNTER — ANTICOAGULATION THERAPY VISIT (OUTPATIENT)
Dept: NURSING | Facility: CLINIC | Age: 64
End: 2019-07-09
Payer: COMMERCIAL

## 2019-07-09 DIAGNOSIS — I48.91 ATRIAL FIBRILLATION (H): ICD-10-CM

## 2019-07-09 DIAGNOSIS — Z79.01 LONG TERM CURRENT USE OF ANTICOAGULANT THERAPY: Primary | ICD-10-CM

## 2019-07-09 DIAGNOSIS — Z95.2 AORTIC VALVE PROSTHESIS PRESENT: ICD-10-CM

## 2019-07-09 LAB — INR POINT OF CARE: 2.6 (ref 0.86–1.14)

## 2019-07-09 PROCEDURE — 36416 COLLJ CAPILLARY BLOOD SPEC: CPT

## 2019-07-09 PROCEDURE — 85610 PROTHROMBIN TIME: CPT | Mod: QW

## 2019-07-09 PROCEDURE — 99207 ZZC NO CHARGE NURSE ONLY: CPT

## 2019-07-09 NOTE — PROGRESS NOTES
ANTICOAGULATION FOLLOW-UP CLINIC VISIT    Patient Name:  Todd S Aschoff  Date:  2019  Contact Type:  Face to Face    SUBJECTIVE:  Patient Findings     Comments:   The patient was assessed for   diet, medication,   missed or extra doses,   bruising or bleeding,   with no problem findings.          Clinical Outcomes     Comments:   The patient was assessed for   diet, medication,   missed or extra doses,   bruising or bleeding,   with no problem findings.             OBJECTIVE    INR Protime   Date Value Ref Range Status   2019 2.6 (A) 0.86 - 1.14 Final       ASSESSMENT / PLAN  INR assessment THER    Recheck INR In: 4 WEEKS    INR Location Clinic      Anticoagulation Summary  As of 2019    INR goal:   2.5-3.5   TTR:   58.5 % (3 y)   INR used for dosin.6 (2019)   Warfarin maintenance plan:   7.5 mg (5 mg x 1.5) every Sun, Thu; 5 mg (5 mg x 1) all other days   Full warfarin instructions:   7.5 mg every Sun, Thu; 5 mg all other days   Weekly warfarin total:   40 mg   No change documented:   Nova John RN   Plan last modified:   Nova John RN (2018)   Next INR check:   2019   Priority:   INR   Target end date:       Indications    Aortic valve prosthesis present [Z95.2]  Atrial fibrillation (H) [I48.91]  Long term current use of anticoagulant therapy [Z79.01]             Anticoagulation Episode Summary     INR check location:       Preferred lab:       Send INR reminders to:   AIDA GUERRERO    Comments:   5mg tabs / CALENDAR      Anticoagulation Care Providers     Provider Role Specialty Phone number    Obdulio Ingram MD [facility] Internal Medicine [phone]            See the Encounter Report to view Anticoagulation Flowsheet and Dosing Calendar (Go to Encounters tab in chart review, and find the Anticoagulation Therapy Visit)    INR is therapeutic today. Patient will continue same maintenance dose.   Follow up in 4 weeks or sooner if needed.        Nova  SHYANN John

## 2019-07-22 DIAGNOSIS — Z79.01 CURRENT USE OF LONG TERM ANTICOAGULATION: ICD-10-CM

## 2019-07-22 NOTE — TELEPHONE ENCOUNTER
"Requested Prescriptions   Pending Prescriptions Disp Refills     warfarin (COUMADIN) 5 MG tablet  Last Written Prescription Date:  4/29/2019  Last Fill Quantity: 110,  # refills: 0   Last office visit: 5/20/2019 with prescribing provider:     Future Office Visit:   110 tablet 0     Sig: Take 1.5 tablets (7.5 mg) by mouth Sun, Tu, Th, Fri and take 1 tablet (5 mg) Mon, Wed, Sat or as instructed by INR Clinic.       Vitamin K Antagonists Failed - 7/22/2019  1:01 PM        Failed - INR is within goal in the past 6 weeks     Confirm INR is within goal in the past 6 weeks.     Recent Labs   Lab Test 07/09/19  1634   INR 2.6*                       Passed - Recent (12 mo) or future (30 days) visit within the authorizing provider's specialty     Patient had office visit in the last 12 months or has a visit in the next 30 days with authorizing provider or within the authorizing provider's specialty.  See \"Patient Info\" tab in inbasket, or \"Choose Columns\" in Meds & Orders section of the refill encounter.              Passed - Medication is active on med list        Passed - Patient is 18 years of age or older        "

## 2019-07-23 RX ORDER — WARFARIN SODIUM 5 MG/1
TABLET ORAL
Qty: 100 TABLET | Refills: 0 | Status: SHIPPED | OUTPATIENT
Start: 2019-07-23 | End: 2019-10-29

## 2019-07-23 NOTE — TELEPHONE ENCOUNTER
Reviewed last [facility] visit done on 7/9/19 - INR therapeutic at 2.6. Updated sig on prescription to reflect most recent dosing.  Prescription approved per [facility] Refill Protocol.    Nanci DURON RN  Anticoagulation Clinic  [place]

## 2019-07-29 NOTE — TELEPHONE ENCOUNTER
Medication Appeal Initiation    We have initiated an appeal for the requested medication:  Medication: Lyrica 100 mg   Appeal Start Date:  7/29/2019  Insurance Company: [employer] - Phone [phone] Fax [phone]  Comments:  Appeal initiated with new diagnosis and faxed to [employer]

## 2019-07-31 NOTE — TELEPHONE ENCOUNTER
Called [employer] to check and make sure they received this appeal and check status, per representative they did receive this and it has been forwarded on for review. He stated that an appeal review can take up to 30 days but generally they respond back sooner than that. He gave a more direct number of [phone] if needed.

## 2019-08-06 ENCOUNTER — ANTICOAGULATION THERAPY VISIT (OUTPATIENT)
Dept: NURSING | Facility: CLINIC | Age: 64
End: 2019-08-06
Payer: COMMERCIAL

## 2019-08-06 DIAGNOSIS — Z79.01 LONG TERM CURRENT USE OF ANTICOAGULANT THERAPY: Primary | ICD-10-CM

## 2019-08-06 DIAGNOSIS — Z95.2 AORTIC VALVE PROSTHESIS PRESENT: ICD-10-CM

## 2019-08-06 DIAGNOSIS — I48.91 ATRIAL FIBRILLATION (H): ICD-10-CM

## 2019-08-06 LAB — INR POINT OF CARE: 2.9 (ref 0.86–1.14)

## 2019-08-06 PROCEDURE — 36416 COLLJ CAPILLARY BLOOD SPEC: CPT

## 2019-08-06 PROCEDURE — 99207 ZZC NO CHARGE NURSE ONLY: CPT

## 2019-08-06 PROCEDURE — 85610 PROTHROMBIN TIME: CPT | Mod: QW

## 2019-08-06 NOTE — PROGRESS NOTES
ANTICOAGULATION FOLLOW-UP CLINIC VISIT    Patient Name:  Todd S Aschoff  Date:  2019  Contact Type:  Face to Face    SUBJECTIVE:  Patient Findings     Comments:   The patient was assessed for   diet, medication,   missed or extra doses,   bruising or bleeding,   with no problem findings.          Clinical Outcomes     Comments:   The patient was assessed for   diet, medication,   missed or extra doses,   bruising or bleeding,   with no problem findings.             OBJECTIVE    INR Protime   Date Value Ref Range Status   2019 2.9 (A) 0.86 - 1.14 Final       ASSESSMENT / PLAN  INR assessment THER    Recheck INR In: 4 WEEKS    INR Location Clinic      Anticoagulation Summary  As of 2019    INR goal:   2.5-3.5   TTR:   59.5 % (3.1 y)   INR used for dosin.9 (2019)   Warfarin maintenance plan:   7.5 mg (5 mg x 1.5) every Sun, Thu; 5 mg (5 mg x 1) all other days   Full warfarin instructions:   7.5 mg every Sun, Thu; 5 mg all other days   Weekly warfarin total:   40 mg   No change documented:   Nova John RN   Plan last modified:   Nova John RN (2018)   Next INR check:   9/3/2019   Priority:   INR   Target end date:   Indefinite    Indications    Aortic valve prosthesis present [Z95.2]  Atrial fibrillation (H) [I48.91]  Long term current use of anticoagulant therapy [Z79.01]             Anticoagulation Episode Summary     INR check location:       Preferred lab:       Send INR reminders to:   AIDA GUERRERO    Comments:   5mg tabs / CALENDAR      Anticoagulation Care Providers     Provider Role Specialty Phone number    Obdulio Ingram MD [facility] Internal Medicine [phone]            See the Encounter Report to view Anticoagulation Flowsheet and Dosing Calendar (Go to Encounters tab in chart review, and find the Anticoagulation Therapy Visit)    INR is therapeutic today. Patient will continue same maintenance dose.   Follow up in 4 weeks or sooner if  needed.      Nova John RN

## 2019-08-07 ENCOUNTER — TELEPHONE (OUTPATIENT)
Dept: INTERNAL MEDICINE | Facility: CLINIC | Age: 64
End: 2019-08-07

## 2019-08-07 DIAGNOSIS — G89.29 CHRONIC LOW BACK PAIN WITH SCIATICA, SCIATICA LATERALITY UNSPECIFIED, UNSPECIFIED BACK PAIN LATERALITY: ICD-10-CM

## 2019-08-07 DIAGNOSIS — M54.40 CHRONIC LOW BACK PAIN WITH SCIATICA, SCIATICA LATERALITY UNSPECIFIED, UNSPECIFIED BACK PAIN LATERALITY: ICD-10-CM

## 2019-08-07 NOTE — TELEPHONE ENCOUNTER
Controlled Substance Refill Request for lyrica  Problem List Complete:  No     PROVIDER TO CONSIDER COMPLETION OF PROBLEM LIST AND OVERVIEW/CONTROLLED SUBSTANCE AGREEMENT    Last Written Prescription Date:  8/2/18  Last Fill Quantity: 270,   # refills: 3    THE MOST RECENT OFFICE VISIT MUST BE WITHIN THE PAST 3 MONTHS. AT LEAST ONE FACE TO FACE VISIT MUST OCCUR EVERY 6 MONTHS. ADDITIONAL VISITS CAN BE VIRTUAL.  (THIS STATEMENT SHOULD BE DELETED.)    Last Office Visit with [facility] care provider: 5/20/19    Future Office visit:     Controlled substance agreement:   Encounter-Level CSA - 04/28/2017:    Controlled Substance Agreement - Scan on 5/2/2017  4:41 PM: CONTROLLED SUBSTANCE AGREEMENT (below)       Patient-Level CSA:    There are no patient-level csa.         Last Urine Drug Screen: No results found for: CDAUT, No results found for: COMDAT, No results found for: THC13, PCP13, COC13, MAMP13, OPI13, AMP13, BZO13, TCA13, MTD13, BAR13, OXY13, PPX13, BUP13     Processing:  Fax Rx to [facility] pharmacy     [email or web address]       checked in past 3 months?  Yes checked 8/7/19-no concerns

## 2019-08-08 DIAGNOSIS — I48.20 CHRONIC ATRIAL FIBRILLATION (H): ICD-10-CM

## 2019-08-08 DIAGNOSIS — F33.42 MAJOR DEPRESSIVE DISORDER, RECURRENT EPISODE, IN FULL REMISSION (H): ICD-10-CM

## 2019-08-08 DIAGNOSIS — E78.5 HYPERLIPIDEMIA LDL GOAL <130: ICD-10-CM

## 2019-08-08 NOTE — LETTER
[facility]  [address], [address]  [place], [place]  [address]                                            TEL:[phone]  FAX:[phone]      Todd S Aschoff  [address] [facility]  [place] [address]      August 12, 2019    Dear Nicko,  I forgot to mention when I called you that Dr. Ingram would like you to have the nurse check your blood pressure when you come in to get your INR checked.  He stated your blood pressure was elevated at your last visit.  Thanks, I didn't want to disturb you again.     Sincerely,    Thania FIELDS RN

## 2019-08-08 NOTE — TELEPHONE ENCOUNTER
Routing refill request to provider for review/approval because:  Drug not on the [facility] refill protocol   Labs not current:  LDL  Blood pressure

## 2019-08-08 NOTE — TELEPHONE ENCOUNTER
"Requested Prescriptions   Pending Prescriptions Disp Refills     simvastatin (ZOCOR) 40 MG tablet [Pharmacy Med Name: Simvastatin Oral Tablet 40 MG] 90 tablet 2     Sig: TAKE ONE TABLET BY MOUTH AT BEDTIME   Last Written Prescription Date:  08/02/2018  Last Fill Quantity: 90,  # refills: 03   Last office visit: 5/20/2019 with prescribing provider:     Future Office Visit:      Statins Protocol Failed - 8/8/2019  7:01 AM        Failed - LDL on file in past 12 months     Recent Labs   Lab Test 08/02/18  1721   *             Passed - No abnormal creatine kinase in past 12 months     No lab results found.             Passed - Recent (12 mo) or future (30 days) visit within the authorizing provider's specialty     Patient had office visit in the last 12 months or has a visit in the next 30 days with authorizing provider or within the authorizing provider's specialty.  See \"Patient Info\" tab in inbasket, or \"Choose Columns\" in Meds & Orders section of the refill encounter.              Passed - Medication is active on med list        Passed - Patient is age 18 or older        guanFACINE (TENEX) 1 MG tablet [Pharmacy Med Name: guanFACINE HCl Oral Tablet 1 MG] 90 tablet 2     Sig: TAKE ONE TABLET BY MOUTH AT BEDTIME   Last Written Prescription Date:  08/02/2018  Last Fill Quantity: 90,  # refills: 03   Last office visit: 5/20/2019 with prescribing provider:     Future Office Visit:      Antiadrenergic Antihypertensives Failed - 8/8/2019  7:01 AM        Failed - Blood pressure less than 140/90 in past 6 months     BP Readings from Last 3 Encounters:   05/20/19 (!) 144/92   11/12/18 121/85   10/23/18 138/89                 Passed - Medication is active on med list        Passed - Patient is age 18 or older        Passed - Normal serum creatinine on file in past 12 months     Recent Labs   Lab Test 05/20/19  1143   CR 1.06             Passed - Recent (6 mo) or future (30 days) visit within the authorizing provider's " "specialty     Patient had office visit in the last 6 months or has a visit in the next 30 days with authorizing provider or within the authorizing provider's specialty.  See \"Patient Info\" tab in inbasket, or \"Choose Columns\" in Meds & Orders section of the refill encounter.            propafenone (RYTHMOL) 150 MG TABS tablet [Pharmacy Med Name: Propafenone HCl Oral Tablet 150 MG] 270 tablet 2     Sig: Take 1 tablet (150 mg) by mouth every 8 hours.   Last Written Prescription Date:  08/02/2018  Last Fill Quantity: 270,  # refills: 03   Last office visit: 5/20/2019 with prescribing provider:     Future Office Visit:      There is no refill protocol information for this order        "

## 2019-08-09 DIAGNOSIS — M54.40 CHRONIC LOW BACK PAIN WITH SCIATICA, SCIATICA LATERALITY UNSPECIFIED, UNSPECIFIED BACK PAIN LATERALITY: ICD-10-CM

## 2019-08-09 DIAGNOSIS — G89.29 CHRONIC LOW BACK PAIN WITH SCIATICA, SCIATICA LATERALITY UNSPECIFIED, UNSPECIFIED BACK PAIN LATERALITY: ICD-10-CM

## 2019-08-09 RX ORDER — PREGABALIN 100 MG/1
100 CAPSULE ORAL 3 TIMES DAILY
Qty: 270 CAPSULE | Refills: 0 | Status: SHIPPED | OUTPATIENT
Start: 2019-08-09 | End: 2019-10-29

## 2019-08-09 RX ORDER — PREGABALIN 100 MG
CAPSULE ORAL
Qty: 180 CAPSULE | Refills: 2 | OUTPATIENT
Start: 2019-08-09

## 2019-08-09 NOTE — TELEPHONE ENCOUNTER
Requested Prescriptions   Pending Prescriptions Disp Refills     LYRICA 100 MG capsule [Pharmacy Med Name: Lyrica Oral Capsule 100 MG]  Last Written Prescription Date:  8/2/2018  Last Fill Quantity: 270,  # refills: 3   Last office visit: 5/20/2019 with prescribing provider:     Future Office Visit:   180 capsule 2     Sig: TAKE 1 CAPSULE BY MOUTH 3 TIMES DAILY  DO NOT TAKE IF SLEEPY. SEDATED       There is no refill protocol information for this order

## 2019-08-12 RX ORDER — SIMVASTATIN 40 MG
TABLET ORAL
Qty: 90 TABLET | Refills: 2 | OUTPATIENT
Start: 2019-08-12

## 2019-08-12 RX ORDER — PROPAFENONE HYDROCHLORIDE 150 MG/1
150 TABLET, COATED ORAL EVERY 8 HOURS
Qty: 270 TABLET | Refills: 2 | OUTPATIENT
Start: 2019-08-12

## 2019-08-12 RX ORDER — GUANFACINE 1 MG/1
TABLET ORAL
Qty: 90 TABLET | Refills: 2 | OUTPATIENT
Start: 2019-08-12

## 2019-08-12 NOTE — TELEPHONE ENCOUNTER
Called patient and he stated he knows he needs a physical.  Patient stated he will call next week to schedule an appointment. Patient taking care of 4 children presently and couldn't talk anymore.  Patient stated he is not in need of any medications at this time.  Will deny request     Letter sent regarding getting blood pressure checked with next INR check.

## 2019-08-12 NOTE — TELEPHONE ENCOUNTER
Last saw me 8/2/2018.  Last saw IM (Dr Garza) for preop on 5/20/2019--BP elevated at that time.    Please contact patient--ask him to get BP check with next INR.  Also ask him to schedule f/u appt with me--either med check or physical exam.

## 2019-08-13 DIAGNOSIS — F33.42 MAJOR DEPRESSIVE DISORDER, RECURRENT EPISODE, IN FULL REMISSION (H): ICD-10-CM

## 2019-08-13 DIAGNOSIS — E78.5 HYPERLIPIDEMIA LDL GOAL <130: ICD-10-CM

## 2019-08-13 DIAGNOSIS — I48.20 CHRONIC ATRIAL FIBRILLATION (H): ICD-10-CM

## 2019-08-13 RX ORDER — SIMVASTATIN 40 MG
TABLET ORAL
Qty: 90 TABLET | Refills: 0 | Status: SHIPPED | OUTPATIENT
Start: 2019-08-13 | End: 2019-10-29

## 2019-08-13 RX ORDER — GUANFACINE 1 MG/1
1 TABLET ORAL AT BEDTIME
Qty: 90 TABLET | Refills: 0 | Status: SHIPPED | OUTPATIENT
Start: 2019-08-13 | End: 2019-10-29

## 2019-08-13 RX ORDER — PROPAFENONE HYDROCHLORIDE 150 MG/1
150 TABLET, COATED ORAL EVERY 8 HOURS
Qty: 270 TABLET | Refills: 0 | Status: SHIPPED | OUTPATIENT
Start: 2019-08-13 | End: 2019-10-29

## 2019-08-13 NOTE — TELEPHONE ENCOUNTER
Patient called back and now needs Rx refilled. He made a px appt with Dr Ingram but could not get in until 10/29/2019. Please call if he still can not refill medication. Ok to lm [phone]  See encounter 8/8/2019    He needs refill on Zocar, guanfacine and propafenone  [facility] [place]

## 2019-08-13 NOTE — TELEPHONE ENCOUNTER
"Requested Prescriptions   Pending Prescriptions Disp Refills     guanFACINE (TENEX) 1 MG tablet  Last Written Prescription Date:  8/2/18  Last Fill Quantity: 90,  # refills: 3   Last office visit: 5/20/2019 with prescribing provider:  Dr. Greg Randle Office Visit:   Next 5 appointments (look out 90 days)    Oct 29, 2019  9:20 AM CDT  PHYSICAL with Obdulio Ingram MD  [facility] [address]  [facility] [id number] [phone]         90 tablet 3     Sig: Take 1 tablet (1 mg) by mouth At Bedtime       Antiadrenergic Antihypertensives Failed - 8/13/2019 11:33 AM        Failed - Blood pressure less than 140/90 in past 6 months     BP Readings from Last 3 Encounters:   05/20/19 (!) 144/92   11/12/18 121/85   10/23/18 138/89                 Passed - Medication is active on med list        Passed - Patient is age 18 or older        Passed - Normal serum creatinine on file in past 12 months     Recent Labs   Lab Test 05/20/19  1143   CR 1.06             Passed - Recent (6 mo) or future (30 days) visit within the authorizing provider's specialty     Patient had office visit in the last 6 months or has a visit in the next 30 days with authorizing provider or within the authorizing provider's specialty.  See \"Patient Info\" tab in inbasket, or \"Choose Columns\" in Meds & Orders section of the refill encounter.      Routing refill request to provider for review/approval because:  Elevated BP           propafenone (RYTHMOL) 150 MG TABS tablet  Last Written Prescription Date:  8/2/18  Last Fill Quantity: 270,  # refills: 3   Last office visit: 5/20/2019 with prescribing provider:  Dr. Greg Randle Office Visit:   Next 5 appointments (look out 90 days)    Oct 29, 2019  9:20 AM CDT  PHYSICAL with Obdulio Ingram MD  [facility] [address]  [facility] [id number] [phone]         270 tablet 3     Sig: " "Take 1 tablet (150 mg) by mouth every 8 hours       There is no refill protocol information for this order    Routing refill request to provider for review/approval because:  Drug not on the [facility] refill protocol              simvastatin (ZOCOR) 40 MG tablet  Last Written Prescription Date:  8/2/18  Last Fill Quantity: 90,  # refills: 3   Last office visit: 5/20/2019 with prescribing provider:  Dr. Garza   Future Office Visit:   Next 5 appointments (look out 90 days)    Oct 29, 2019  9:20 AM CDT  PHYSICAL with Obdulio Ingram MD  [facility] [address]  [facility] [address]  [phone]         90 tablet 3     Sig: Take 1 tablet (40 mg) by mouth At Bedtime       Statins Protocol Failed - 8/13/2019 11:33 AM        Failed - LDL on file in past 12 months     Recent Labs   Lab Test 08/02/18  1721   *             Passed - No abnormal creatine kinase in past 12 months     No lab results found.             Passed - Recent (12 mo) or future (30 days) visit within the authorizing provider's specialty     Patient had office visit in the last 12 months or has a visit in the next 30 days with authorizing provider or within the authorizing provider's specialty.  See \"Patient Info\" tab in inbasket, or \"Choose Columns\" in Meds & Orders section of the refill encounter.              Passed - Medication is active on med list        Passed - Patient is age 18 or older    Medication is being filled for 1 time refill only due to:  Future appointment scheduled          "

## 2019-08-13 NOTE — TELEPHONE ENCOUNTER
Patient called back and now needs Rx refilled. He made a px appt with Dr Ingram but could not get in until 10/29/2019. Please call if he still can not refill medication. Ok to  [phone]

## 2019-08-21 NOTE — TELEPHONE ENCOUNTER
Called  at [phone] and per rep the appeal was Approved. Did find letter in the media tab.    MEDICATION APPEAL APPROVED    Medication: Lyrica 100 mg   Authorization Effective Date: 8/8/2019  Authorization Expiration Date: 8/8/2020  Approved Dose/Quantity: 270  Reference #: [id number]   Insurance Company: [employer] - Phone [phone] Fax [phone]  Which Pharmacy is filling the prescription (Not needed for infusion/clinic administered): [id number] - [facility] [address] [place] [address]

## 2019-08-27 DIAGNOSIS — G89.29 CHRONIC LOW BACK PAIN: ICD-10-CM

## 2019-08-27 DIAGNOSIS — M54.50 CHRONIC LOW BACK PAIN: ICD-10-CM

## 2019-08-27 NOTE — TELEPHONE ENCOUNTER
"Requested Prescriptions   Pending Prescriptions Disp Refills     EQL ARTHRITIS PAIN RELIEF 650 MG CR tablet [Pharmacy Med Name: EQL   Arthritis Pain Relief Oral Tablet Extended Release 650 MG]    Last Written Prescription Date:  5/16/19  Last Fill Quantity: 180,  # refills: 0   Last office visit: 5/20/2019 with prescribing provider:  Greg   Future Office Visit:   Next 5 appointments (look out 90 days)    Oct 29, 2019  9:20 AM CDT  PHYSICAL with Obdulio Ingram MD  [facility]) [address]  [facility] [address]  [phone]          180 tablet 0     Sig: TAKE ONE TABLET BY MOUTH TWICE DAILY       Analgesics (Non-Narcotic Tylenol and ASA Only) Passed - 8/27/2019 11:57 AM        Passed - Recent (12 mo) or future (30 days) visit within the authorizing provider's specialty     Patient had office visit in the last 12 months or has a visit in the next 30 days with authorizing provider or within the authorizing provider's specialty.  See \"Patient Info\" tab in inbasket, or \"Choose Columns\" in Meds & Orders section of the refill encounter.              Passed - Patient is 7 months old or older     If patient is a peds patient of the age 7 mos -12 years, ok to refill using weight-based dosing.     If >3g daily and/or sig is not \"prn\", check for liver enzymes. If normal in the last year, ok to refill.  If not, refer to the provider.          Passed - Medication is active on med list          "

## 2019-08-28 DIAGNOSIS — R41.3 MEMORY DIFFICULTIES: ICD-10-CM

## 2019-08-28 NOTE — TELEPHONE ENCOUNTER
"Requested Prescriptions   Pending Prescriptions Disp Refills     donepezil (ARICEPT) 10 MG tablet [Pharmacy Med Name: Donepezil HCl Oral Tablet 10 MG] 90 tablet 2     Sig: TAKE ONE TABLET BY MOUTH AT BEDTIME   Last Written Prescription Date:  08/02/2018  Last Fill Quantity: 90,  # refills: 03   Last office visit: 5/20/2019 with prescribing provider:     Future Office Visit:   Next 5 appointments (look out 90 days)    Oct 29, 2019  9:20 AM CDT  PHYSICAL with Obdulio Ingram MD  [facility] ([facility]) [address]  [facility] [address]  [phone]           Miscellaneous Dementia Agents Passed - 8/28/2019  7:01 AM        Passed - Recent (12 mo) or future (30 days) visit within the authorizing provider's specialty     Patient had office visit in the last 12 months or has a visit in the next 30 days with authorizing provider or within the authorizing provider's specialty.  See \"Patient Info\" tab in inbasket, or \"Choose Columns\" in Meds & Orders section of the refill encounter.              Passed - Medication is active on med list        Passed - Patient is 18 years of age or older        "

## 2019-08-29 RX ORDER — DONEPEZIL HYDROCHLORIDE 10 MG/1
TABLET, FILM COATED ORAL
Qty: 90 TABLET | Refills: 0 | Status: SHIPPED | OUTPATIENT
Start: 2019-08-29 | End: 2019-10-29

## 2019-08-29 RX ORDER — ACETAMINOPHEN 650 MG/1
TABLET, FILM COATED, EXTENDED RELEASE ORAL
Qty: 180 TABLET | Refills: 0 | Status: SHIPPED | OUTPATIENT
Start: 2019-08-29 | End: 2019-10-29

## 2019-08-29 NOTE — TELEPHONE ENCOUNTER
Prescription approved per [facility] Refill Protocol.  Next 5 appointments (look out 90 days)    Oct 29, 2019  9:20 AM CDT  PHYSICAL with Obdulio Ingram MD  [facility] ([facility]) [facility] [address]  [facility] [id number] [phone]

## 2019-09-10 DIAGNOSIS — M62.838 MUSCLE SPASM: ICD-10-CM

## 2019-09-11 NOTE — TELEPHONE ENCOUNTER
Requested Prescriptions   Pending Prescriptions Disp Refills     cyclobenzaprine (FLEXERIL) 10 MG tablet [Pharmacy Med Name: Cyclobenzaprine HCl Oral Tablet 10 MG] 270 tablet 2     Sig: TAKE ONE TABLET BY MOUTH THREE TIMES DAILY AS NEEDED FOR MUSCLE SPASM.       There is no refill protocol information for this order      Last Written Prescription Date:  08/02/2018  Last Fill Quantity: 270,  # refills: 03   Last office visit: 5/20/2019 with prescribing provider:     Future Office Visit:   Next 5 appointments (look out 90 days)    Oct 29, 2019  9:20 AM CDT  PHYSICAL with Obdulio Ingram MD  [facility] ([facility]) [address]  [facility] [address] [phone]

## 2019-09-13 RX ORDER — CYCLOBENZAPRINE HCL 10 MG
TABLET ORAL
Qty: 270 TABLET | Refills: 2 | Status: SHIPPED | OUTPATIENT
Start: 2019-09-13 | End: 2020-05-28

## 2019-09-17 ENCOUNTER — ANTICOAGULATION THERAPY VISIT (OUTPATIENT)
Dept: NURSING | Facility: CLINIC | Age: 64
End: 2019-09-17
Payer: COMMERCIAL

## 2019-09-17 DIAGNOSIS — Z95.2 AORTIC VALVE PROSTHESIS PRESENT: ICD-10-CM

## 2019-09-17 DIAGNOSIS — I48.91 ATRIAL FIBRILLATION (H): ICD-10-CM

## 2019-09-17 DIAGNOSIS — Z79.01 LONG TERM CURRENT USE OF ANTICOAGULANT THERAPY: Primary | ICD-10-CM

## 2019-09-17 LAB — INR POINT OF CARE: 2.5 (ref 0.86–1.14)

## 2019-09-17 PROCEDURE — 85610 PROTHROMBIN TIME: CPT | Mod: QW

## 2019-09-17 PROCEDURE — 36416 COLLJ CAPILLARY BLOOD SPEC: CPT

## 2019-09-17 NOTE — PROGRESS NOTES
ANTICOAGULATION FOLLOW-UP CLINIC VISIT    Patient Name:  Todd S Aschoff  Date:  2019  Contact Type:  Face to Face    SUBJECTIVE:  Patient Findings     Comments:   The patient was assessed for   diet, medication,   missed or extra doses,   bruising or bleeding,   with no problem findings.        INR is therapeutic today.   Patient will continue same maintenance dose.   Follow up in 4 weeks.    Clinical Outcomes     Comments:   The patient was assessed for   diet, medication,   missed or extra doses,   bruising or bleeding,   with no problem findings.             OBJECTIVE    INR Protime   Date Value Ref Range Status   2019 2.5 (A) 0.86 - 1.14 Final       ASSESSMENT / PLAN  INR assessment THER    Recheck INR In: 4 WEEKS    INR Location Clinic      Anticoagulation Summary  As of 2019    INR goal:   2.5-3.5   TTR:   60.9 % (3.2 y)   INR used for dosin.5 (2019)   Warfarin maintenance plan:   7.5 mg (5 mg x 1.5) every Sun, Thu; 5 mg (5 mg x 1) all other days   Full warfarin instructions:   7.5 mg every Sun, Thu; 5 mg all other days   Weekly warfarin total:   40 mg   No change documented:   Nova John, RN   Plan last modified:   Nova John RN (2018)   Next INR check:   10/15/2019   Priority:   INR   Target end date:   Indefinite    Indications    Aortic valve prosthesis present [Z95.2]  Atrial fibrillation (H) [I48.91]  Long term current use of anticoagulant therapy [Z79.01]             Anticoagulation Episode Summary     INR check location:       Preferred lab:       Send INR reminders to:   AIDA GUERRERO    Comments:   5mg tabs / CALENDAR      Anticoagulation Care Providers     Provider Role Specialty Phone number    Obdulio Ingram MD [facility] Internal Medicine [phone]            See the Encounter Report to view Anticoagulation Flowsheet and Dosing Calendar (Go to Encounters tab in chart review, and find the Anticoagulation Therapy Visit)    INR is therapeutic  today.   Patient will continue same maintenance dose.   Follow up in 4 weeks.        Nova John RN

## 2019-10-11 ENCOUNTER — TRANSFERRED RECORDS (OUTPATIENT)
Dept: HEALTH INFORMATION MANAGEMENT | Facility: CLINIC | Age: 64
End: 2019-10-11

## 2019-10-15 ENCOUNTER — ANTICOAGULATION THERAPY VISIT (OUTPATIENT)
Dept: NURSING | Facility: CLINIC | Age: 64
End: 2019-10-15
Payer: COMMERCIAL

## 2019-10-15 DIAGNOSIS — Z95.2 AORTIC VALVE PROSTHESIS PRESENT: ICD-10-CM

## 2019-10-15 DIAGNOSIS — Z79.01 LONG TERM CURRENT USE OF ANTICOAGULANT THERAPY: ICD-10-CM

## 2019-10-15 DIAGNOSIS — I48.91 ATRIAL FIBRILLATION (H): ICD-10-CM

## 2019-10-15 LAB — INR POINT OF CARE: 3.2 (ref 0.86–1.14)

## 2019-10-15 PROCEDURE — 99207 ZZC NO CHARGE NURSE ONLY: CPT

## 2019-10-15 PROCEDURE — 36416 COLLJ CAPILLARY BLOOD SPEC: CPT

## 2019-10-15 PROCEDURE — 85610 PROTHROMBIN TIME: CPT | Mod: QW

## 2019-10-15 NOTE — PROGRESS NOTES
ANTICOAGULATION FOLLOW-UP CLINIC VISIT    Patient Name:  Todd S Aschoff  Date:  10/15/2019  Contact Type:  Face to Face    SUBJECTIVE:  Patient Findings     Comments:   Bleeding Signs/Symptoms: NO  Thromboembolic Signs/Symptoms: NO     Medication Changes:  NO  Dietary Changes:  NO  Bacterial/Viral Infection: NO     Missed Coumadin Doses: NO  Other Concerns:  NO          Clinical Outcomes     Negatives:   Major bleeding event, Thromboembolic event, Anticoagulation-related hospital admission, Anticoagulation-related ED visit, Anticoagulation-related fatality    Comments:   Bleeding Signs/Symptoms: NO  Thromboembolic Signs/Symptoms: NO     Medication Changes:  NO  Dietary Changes:  NO  Bacterial/Viral Infection: NO     Missed Coumadin Doses: NO  Other Concerns:  NO             OBJECTIVE    INR Protime   Date Value Ref Range Status   10/15/2019 3.2 (A) 0.86 - 1.14 Final       ASSESSMENT / PLAN  INR assessment THER    Recheck INR In: 4 WEEKS    INR Location Clinic      Anticoagulation Summary  As of 10/15/2019    INR goal:   2.5-3.5   TTR:   61.8 % (3.3 y)   INR used for dosing:   3.2 (10/15/2019)   Warfarin maintenance plan:   7.5 mg (5 mg x 1.5) every Sun, Thu; 5 mg (5 mg x 1) all other days   Full warfarin instructions:   7.5 mg every Sun, Thu; 5 mg all other days   Weekly warfarin total:   40 mg   No change documented:   Symone Bui RN   Plan last modified:   Nova John RN (12/27/2018)   Next INR check:   11/12/2019   Priority:   INR   Target end date:   Indefinite    Indications    Aortic valve prosthesis present [Z95.2]  Atrial fibrillation (H) [I48.91]  Long term current use of anticoagulant therapy [Z79.01]             Anticoagulation Episode Summary     INR check location:       Preferred lab:       Send INR reminders to:   AIDA GUERRERO    Comments:   5mg tabs / CALENDAR      Anticoagulation Care Providers     Provider Role Specialty Phone number    Obdulio Ingram MD Responsible Internal Medicine  [phone]            See the Encounter Report to view Anticoagulation Flowsheet and Dosing Calendar (Go to Encounters tab in chart review, and find the Anticoagulation Therapy Visit)        Symone Bui RN

## 2019-10-15 NOTE — PATIENT INSTRUCTIONS
Anticoagulation Clinic:  Please call [phone] with any problems or questions regarding your Warfarin therapy.

## 2019-10-29 ENCOUNTER — OFFICE VISIT (OUTPATIENT)
Dept: INTERNAL MEDICINE | Facility: CLINIC | Age: 64
End: 2019-10-29
Payer: COMMERCIAL

## 2019-10-29 VITALS
HEART RATE: 73 BPM | BODY MASS INDEX: 41.35 KG/M2 | TEMPERATURE: 97.7 F | OXYGEN SATURATION: 96 % | HEIGHT: 73 IN | RESPIRATION RATE: 18 BRPM | WEIGHT: 312 LBS | SYSTOLIC BLOOD PRESSURE: 110 MMHG | DIASTOLIC BLOOD PRESSURE: 70 MMHG

## 2019-10-29 DIAGNOSIS — M62.830 BACK MUSCLE SPASM: ICD-10-CM

## 2019-10-29 DIAGNOSIS — I48.20 CHRONIC ATRIAL FIBRILLATION (H): ICD-10-CM

## 2019-10-29 DIAGNOSIS — M54.50 CHRONIC BILATERAL LOW BACK PAIN WITHOUT SCIATICA: ICD-10-CM

## 2019-10-29 DIAGNOSIS — Z00.00 ROUTINE GENERAL MEDICAL EXAMINATION AT A HEALTH CARE FACILITY: Primary | ICD-10-CM

## 2019-10-29 DIAGNOSIS — Z23 NEED FOR PROPHYLACTIC VACCINATION AND INOCULATION AGAINST INFLUENZA: ICD-10-CM

## 2019-10-29 DIAGNOSIS — F33.42 MAJOR DEPRESSIVE DISORDER, RECURRENT EPISODE, IN FULL REMISSION (H): ICD-10-CM

## 2019-10-29 DIAGNOSIS — E78.5 HYPERLIPIDEMIA LDL GOAL <130: ICD-10-CM

## 2019-10-29 DIAGNOSIS — Z12.5 SCREENING PSA (PROSTATE SPECIFIC ANTIGEN): ICD-10-CM

## 2019-10-29 DIAGNOSIS — F51.01 PRIMARY INSOMNIA: ICD-10-CM

## 2019-10-29 DIAGNOSIS — Z79.899 CONTROLLED SUBSTANCE AGREEMENT SIGNED: ICD-10-CM

## 2019-10-29 DIAGNOSIS — N32.81 OVERACTIVE BLADDER: ICD-10-CM

## 2019-10-29 DIAGNOSIS — Z79.01 CURRENT USE OF LONG TERM ANTICOAGULATION: ICD-10-CM

## 2019-10-29 DIAGNOSIS — G89.29 CHRONIC BILATERAL LOW BACK PAIN WITHOUT SCIATICA: ICD-10-CM

## 2019-10-29 DIAGNOSIS — E03.9 ACQUIRED HYPOTHYROIDISM: ICD-10-CM

## 2019-10-29 DIAGNOSIS — E66.01 MORBID OBESITY, UNSPECIFIED OBESITY TYPE (H): ICD-10-CM

## 2019-10-29 DIAGNOSIS — F10.21 ALCOHOL DEPENDENCE IN REMISSION (H): ICD-10-CM

## 2019-10-29 DIAGNOSIS — R41.3 MEMORY DIFFICULTIES: ICD-10-CM

## 2019-10-29 LAB
ERYTHROCYTE [DISTWIDTH] IN BLOOD BY AUTOMATED COUNT: 13.4 % (ref 10–15)
HCT VFR BLD AUTO: 49.7 % (ref 40–53)
HGB BLD-MCNC: 16.8 G/DL (ref 13.3–17.7)
MCH RBC QN AUTO: 31.8 PG (ref 26.5–33)
MCHC RBC AUTO-ENTMCNC: 33.8 G/DL (ref 31.5–36.5)
MCV RBC AUTO: 94 FL (ref 78–100)
PLATELET # BLD AUTO: 223 10E9/L (ref 150–450)
RBC # BLD AUTO: 5.29 10E12/L (ref 4.4–5.9)
WBC # BLD AUTO: 6.1 10E9/L (ref 4–11)

## 2019-10-29 PROCEDURE — 36415 COLL VENOUS BLD VENIPUNCTURE: CPT | Performed by: INTERNAL MEDICINE

## 2019-10-29 PROCEDURE — G0103 PSA SCREENING: HCPCS | Performed by: INTERNAL MEDICINE

## 2019-10-29 PROCEDURE — 90682 RIV4 VACC RECOMBINANT DNA IM: CPT | Performed by: INTERNAL MEDICINE

## 2019-10-29 PROCEDURE — 85027 COMPLETE CBC AUTOMATED: CPT | Performed by: INTERNAL MEDICINE

## 2019-10-29 PROCEDURE — 84439 ASSAY OF FREE THYROXINE: CPT | Performed by: INTERNAL MEDICINE

## 2019-10-29 PROCEDURE — 90471 IMMUNIZATION ADMIN: CPT | Performed by: INTERNAL MEDICINE

## 2019-10-29 PROCEDURE — 80053 COMPREHEN METABOLIC PANEL: CPT | Performed by: INTERNAL MEDICINE

## 2019-10-29 PROCEDURE — 99396 PREV VISIT EST AGE 40-64: CPT | Mod: 25 | Performed by: INTERNAL MEDICINE

## 2019-10-29 PROCEDURE — 84443 ASSAY THYROID STIM HORMONE: CPT | Performed by: INTERNAL MEDICINE

## 2019-10-29 PROCEDURE — 99213 OFFICE O/P EST LOW 20 MIN: CPT | Mod: 25 | Performed by: INTERNAL MEDICINE

## 2019-10-29 PROCEDURE — 80061 LIPID PANEL: CPT | Performed by: INTERNAL MEDICINE

## 2019-10-29 RX ORDER — GUANFACINE 1 MG/1
1 TABLET ORAL AT BEDTIME
Qty: 90 TABLET | Refills: 3 | Status: SHIPPED | OUTPATIENT
Start: 2019-10-29 | End: 2020-05-29

## 2019-10-29 RX ORDER — DIAZEPAM 5 MG
TABLET ORAL
Qty: 270 TABLET | Refills: 0 | Status: SHIPPED | OUTPATIENT
Start: 2019-10-29 | End: 2020-03-03

## 2019-10-29 RX ORDER — PREGABALIN 100 MG/1
100 CAPSULE ORAL 3 TIMES DAILY
Qty: 270 CAPSULE | Refills: 0 | Status: SHIPPED | OUTPATIENT
Start: 2019-10-29 | End: 2020-02-13

## 2019-10-29 RX ORDER — PROPAFENONE HYDROCHLORIDE 150 MG/1
150 TABLET, COATED ORAL EVERY 8 HOURS
Qty: 270 TABLET | Refills: 3 | Status: SHIPPED | OUTPATIENT
Start: 2019-10-29 | End: 2020-10-30

## 2019-10-29 RX ORDER — DONEPEZIL HYDROCHLORIDE 10 MG/1
TABLET, FILM COATED ORAL
Qty: 90 TABLET | Refills: 3 | Status: SHIPPED | OUTPATIENT
Start: 2019-10-29 | End: 2020-09-08

## 2019-10-29 RX ORDER — LEVOTHYROXINE SODIUM 150 UG/1
150 TABLET ORAL DAILY
Qty: 90 TABLET | Refills: 3 | Status: SHIPPED | OUTPATIENT
Start: 2019-10-29 | End: 2020-12-11

## 2019-10-29 RX ORDER — SIMVASTATIN 40 MG
TABLET ORAL
Qty: 90 TABLET | Refills: 3 | Status: SHIPPED | OUTPATIENT
Start: 2019-10-29 | End: 2020-12-07

## 2019-10-29 RX ORDER — VENLAFAXINE HYDROCHLORIDE 150 MG/1
CAPSULE, EXTENDED RELEASE ORAL
Qty: 90 CAPSULE | Refills: 0
Start: 2019-10-29 | End: 2020-05-29

## 2019-10-29 RX ORDER — TRAZODONE HYDROCHLORIDE 50 MG/1
TABLET, FILM COATED ORAL
Qty: 90 TABLET | Refills: 3 | Status: ON HOLD | OUTPATIENT
Start: 2019-10-29 | End: 2020-12-02

## 2019-10-29 RX ORDER — MIRABEGRON 50 MG/1
50 TABLET, EXTENDED RELEASE ORAL DAILY
Qty: 90 TABLET | Refills: 3 | Status: ON HOLD | OUTPATIENT
Start: 2019-10-29 | End: 2020-12-02

## 2019-10-29 RX ORDER — WARFARIN SODIUM 5 MG/1
TABLET ORAL
Qty: 100 TABLET | Refills: 0 | Status: SHIPPED | OUTPATIENT
Start: 2019-10-29 | End: 2020-02-11

## 2019-10-29 RX ORDER — SENNOSIDES 8.6 MG
650 CAPSULE ORAL 2 TIMES DAILY
Qty: 180 TABLET | Refills: 3 | Status: SHIPPED | OUTPATIENT
Start: 2019-10-29 | End: 2020-10-02

## 2019-10-29 SDOH — ECONOMIC STABILITY: INCOME INSECURITY: HOW HARD IS IT FOR YOU TO PAY FOR THE VERY BASICS LIKE FOOD, HOUSING, MEDICAL CARE, AND HEATING?: NOT HARD AT ALL

## 2019-10-29 SDOH — ECONOMIC STABILITY: FOOD INSECURITY: WITHIN THE PAST 12 MONTHS, THE FOOD YOU BOUGHT JUST DIDN'T LAST AND YOU DIDN'T HAVE MONEY TO GET MORE.: NEVER TRUE

## 2019-10-29 SDOH — ECONOMIC STABILITY: TRANSPORTATION INSECURITY
IN THE PAST 12 MONTHS, HAS THE LACK OF TRANSPORTATION KEPT YOU FROM MEDICAL APPOINTMENTS OR FROM GETTING MEDICATIONS?: NO

## 2019-10-29 SDOH — SOCIAL STABILITY: SOCIAL INSECURITY
WITHIN THE LAST YEAR, HAVE YOU BEEN KICKED, HIT, SLAPPED, OR OTHERWISE PHYSICALLY HURT BY YOUR PARTNER OR EX-PARTNER?: NO

## 2019-10-29 SDOH — SOCIAL STABILITY: SOCIAL NETWORK
DO YOU BELONG TO ANY CLUBS OR ORGANIZATIONS SUCH AS CHURCH GROUPS UNIONS, FRATERNAL OR ATHLETIC GROUPS, OR SCHOOL GROUPS?: NO

## 2019-10-29 SDOH — SOCIAL STABILITY: SOCIAL NETWORK: ARE YOU MARRIED, WIDOWED, DIVORCED, SEPARATED, NEVER MARRIED, OR LIVING WITH A PARTNER?: MARRIED

## 2019-10-29 SDOH — SOCIAL STABILITY: SOCIAL NETWORK: IN A TYPICAL WEEK, HOW MANY TIMES DO YOU TALK ON THE PHONE WITH FAMILY, FRIENDS, OR NEIGHBORS?: ONCE A WEEK

## 2019-10-29 SDOH — SOCIAL STABILITY: SOCIAL INSECURITY: WITHIN THE LAST YEAR, HAVE YOU BEEN AFRAID OF YOUR PARTNER OR EX-PARTNER?: NO

## 2019-10-29 SDOH — SOCIAL STABILITY: SOCIAL NETWORK: HOW OFTEN DO YOU ATTENT MEETINGS OF THE CLUB OR ORGANIZATION YOU BELONG TO?: NEVER

## 2019-10-29 SDOH — SOCIAL STABILITY: SOCIAL INSECURITY: WITHIN THE LAST YEAR, HAVE YOU BEEN HUMILIATED OR EMOTIONALLY ABUSED IN OTHER WAYS BY YOUR PARTNER OR EX-PARTNER?: NO

## 2019-10-29 SDOH — ECONOMIC STABILITY: TRANSPORTATION INSECURITY
IN THE PAST 12 MONTHS, HAS LACK OF TRANSPORTATION KEPT YOU FROM MEETINGS, WORK, OR FROM GETTING THINGS NEEDED FOR DAILY LIVING?: NO

## 2019-10-29 SDOH — HEALTH STABILITY: MENTAL HEALTH
STRESS IS WHEN SOMEONE FEELS TENSE, NERVOUS, ANXIOUS, OR CAN'T SLEEP AT NIGHT BECAUSE THEIR MIND IS TROUBLED. HOW STRESSED ARE YOU?: ONLY A LITTLE

## 2019-10-29 SDOH — SOCIAL STABILITY: SOCIAL NETWORK: HOW OFTEN DO YOU ATTEND CHURCH OR RELIGIOUS SERVICES?: MORE THAN 4 TIMES PER YEAR

## 2019-10-29 SDOH — SOCIAL STABILITY: SOCIAL INSECURITY
WITHIN THE LAST YEAR, HAVE TO BEEN RAPED OR FORCED TO HAVE ANY KIND OF SEXUAL ACTIVITY BY YOUR PARTNER OR EX-PARTNER?: NO

## 2019-10-29 SDOH — SOCIAL STABILITY: SOCIAL NETWORK: HOW OFTEN DO YOU GET TOGETHER WITH FRIENDS OR RELATIVES?: ONCE A WEEK

## 2019-10-29 SDOH — ECONOMIC STABILITY: FOOD INSECURITY: WITHIN THE PAST 12 MONTHS, YOU WORRIED THAT YOUR FOOD WOULD RUN OUT BEFORE YOU GOT MONEY TO BUY MORE.: NEVER TRUE

## 2019-10-29 ASSESSMENT — ANXIETY QUESTIONNAIRES
1. FEELING NERVOUS, ANXIOUS, OR ON EDGE: NOT AT ALL
GAD7 TOTAL SCORE: 0
2. NOT BEING ABLE TO STOP OR CONTROL WORRYING: NOT AT ALL
IF YOU CHECKED OFF ANY PROBLEMS ON THIS QUESTIONNAIRE, HOW DIFFICULT HAVE THESE PROBLEMS MADE IT FOR YOU TO DO YOUR WORK, TAKE CARE OF THINGS AT HOME, OR GET ALONG WITH OTHER PEOPLE: NOT DIFFICULT AT ALL
7. FEELING AFRAID AS IF SOMETHING AWFUL MIGHT HAPPEN: NOT AT ALL
5. BEING SO RESTLESS THAT IT IS HARD TO SIT STILL: NOT AT ALL
6. BECOMING EASILY ANNOYED OR IRRITABLE: NOT AT ALL
3. WORRYING TOO MUCH ABOUT DIFFERENT THINGS: NOT AT ALL

## 2019-10-29 ASSESSMENT — PATIENT HEALTH QUESTIONNAIRE - PHQ9
SUM OF ALL RESPONSES TO PHQ QUESTIONS 1-9: 3
5. POOR APPETITE OR OVEREATING: NOT AT ALL

## 2019-10-29 ASSESSMENT — ENCOUNTER SYMPTOMS: MYALGIAS: 1

## 2019-10-29 ASSESSMENT — PAIN SCALES - GENERAL: PAINLEVEL: EXTREME PAIN (8)

## 2019-10-29 ASSESSMENT — MIFFLIN-ST. JEOR: SCORE: 2269.1

## 2019-10-29 NOTE — PROGRESS NOTES
SUBJECTIVE:   CC: Todd S Aschoff is an 63 year old male who presents for preventative health visit.     Healthy Habits:     Getting at least 3 servings of Calcium per day:  Yes    Bi-annual eye exam:  Yes    Dental care twice a year:  Yes    Sleep apnea or symptoms of sleep apnea:  None    Diet:  Regular (no restrictions)    Frequency of exercise:  2-3 days/week    Duration of exercise:  15-30 minutes    Taking medications regularly:  Yes    Medication side effects:  None    PHQ-2 Total Score: 0    Additional concerns today:  No  Imm/Inj   Associated symptoms include myalgias.           PROBLEMS TO ADD ON...  In addition to a preventive exam, we addressed chronic conditions including chronic low back pain (reviewing and updating a CSA due to chronic use of diazepam as a muscle relaxant), chronic atrial fibrillation and aortic valve replacement status, hyperlipidemia, hypertension, depression, hypothyroidism, chronic urinary urgency.     Chronic low back pain:  Reviewed a CSA, patient has signed the form. Will continue diazepam, also Lyrica, and Flexeril.     Chronic atrial fibrillation/aortic valve replacement status, on chronic oral anticoagulation.   The patient follows at the [place] office for anticoagulation monitoring. He has not seen cardiology in a while.   He denies having significant palpitations on Propafenone.     Depression:  Appears to remain in remission. Antidepressant medications are being refilled by psychiatry.     Hyperlipidemia  We agreed to update lipids. The patient is tolerating his current medications without any adverse effects..     Hypertension:  BP appears satisfactorily controlled     Hypothyroidism:  Patient reports no symptoms of thyroid disturbance.     Previously reported impaired cognition, chronic bladder symptoms:   Continue Aricept, Myrbetriq      Today's PHQ-2 Score:   PHQ-2 ( 1999 [employer]) 10/29/2019   Q1: Little interest or pleasure in doing things 0   Q2: Feeling down,  depressed or hopeless 0   PHQ-2 Score 0   Q1: Little interest or pleasure in doing things Not at all   Q2: Feeling down, depressed or hopeless Not at all   PHQ-2 Score 0       Abuse: Current or Past(Physical, Sexual or Emotional)- No  Do you feel safe in your environment? Yes    Social History     Tobacco Use     Smoking status: Never Smoker     Smokeless tobacco: Never Used   Substance Use Topics     Alcohol use: No     Comment: Stopped drinking alcohol ~2009         Alcohol Use 10/29/2019   Prescreen: >3 drinks/day or >7 drinks/week? No   Prescreen: >3 drinks/day or >7 drinks/week? -       Last PSA:   PSA   Date Value Ref Range Status   06/12/2018 3.84 0 - 4 ug/L Final     Comment:     Assay Method:  Chemiluminescence using Siemens Vista analyzer       Reviewed orders with patient. Reviewed health maintenance and updated orders accordingly - Yes  Lab work is in process    Reviewed and updated as needed this visit by clinical staff  Tobacco  Allergies  Meds  Med Hx  Surg Hx  Fam Hx  Soc Hx        Reviewed and updated as needed this visit by Provider            Review of Systems   HENT: Positive for hearing loss.    Musculoskeletal: Positive for myalgias.     CONSTITUTIONAL: NEGATIVE for fever, chills, change in weight  INTEGUMENTARY/SKIN: NEGATIVE for worrisome rashes, moles or lesions  EYES: NEGATIVE for vision changes or irritation  ENT: NEGATIVE for ear, mouth and throat problems  RESP: NEGATIVE for significant cough or SOB  CV: NEGATIVE for chest pain, palpitations or peripheral edema  GI: NEGATIVE for nausea, abdominal pain, heartburn, or change in bowel habits   male: negative for dysuria, hematuria, decreased urinary stream, erectile dysfunction, urethral discharge  MUSCULOSKELETAL: NEGATIVE for significant arthralgias or myalgia  NEURO: NEGATIVE for weakness, dizziness or paresthesias  ENDOCRINE: NEGATIVE for temperature intolerance, skin/hair changes  HEME/ALLERGY/IMMUNE: NEGATIVE for bleeding  "problems  PSYCHIATRIC: NEGATIVE for changes in mood or affect    OBJECTIVE:   Vitals: /70 (BP Location: Right arm, Patient Position: Sitting, Cuff Size: Adult Large)   Pulse 73   Temp 97.7  F (36.5  C) (Oral)   Resp 18   Ht 1.854 m (6' 1\")   Wt 141.5 kg (312 lb)   SpO2 96%   BMI 41.16 kg/m    BMI= Body mass index is 41.16 kg/m .     Physical Exam  GENERAL: healthy, alert and no distress  EYES: Eyes grossly normal to inspection, PERRL and conjunctivae and sclerae normal  HENT: ear canals and TM's normal, nose and mouth without ulcers or lesions  NECK: no adenopathy, no asymmetry, masses, or scars and thyroid normal to palpation  RESP: lungs clear to auscultation - no rales, rhonchi or wheezes  CV: regular rate and rhythm, normal S1 S2, no S3 or S4, no murmur, click or rub, no peripheral edema and peripheral pulses strong  ABDOMEN: soft, nontender, no hepatosplenomegaly, no masses and bowel sounds normal   (male): normal male genitalia without lesions or urethral discharge, no hernia  RECTAL: normal sphincter tone, no rectal masses, prostate normal size, smooth, nontender without nodules or masses  MS: no gross musculoskeletal defects noted, no edema  SKIN: no suspicious lesions or rashes  NEURO: Normal strength and tone, mentation intact and speech normal  PSYCH: mentation appears normal, affect normal/bright    Diagnostic Test Results:  Labs reviewed in Epic    ASSESSMENT/PLAN:   (Z00.00) Routine general medical examination at a health care facility  (primary encounter diagnosis)  Comment: Stable health. See epic orders.   Plan: Comprehensive metabolic panel, Lipid panel         reflex to direct LDL Fasting, TSH with free T4         reflex, CBC with platelets          (M54.5,  G89.29) Chronic bilateral low back pain without sciatica  Comment: Reviewed CSA. Continue current meds.   Plan: acetaminophen (EQL ARTHRITIS PAIN RELIEF) 650         MG CR tablet, pregabalin (LYRICA) 100 MG         capsule, " OFFICE/OUTPT VISIT,EST,LEVL III          (Z79.899) Controlled substance agreement signed- ok 1/9/19  Plan: diazepam (VALIUM) 5 MG tablet          (R41.3) Memory difficulties  Comment: Stable. Continue current meds.   Plan: donepezil (ARICEPT) 10 MG tablet, OFFICE/OUTPT         VISIT,EST,LEVL III          (E66.01) Obesity, BMI >35 with comorbidities  Comment: Discussed.    (F33.42) Major depressive disorder, recurrent episode, in full remission (H)  Comment: Stable. Continue to follow with psychiatry.   Plan: guanFACINE (TENEX) 1 MG tablet, venlafaxine         (EFFEXOR-XR) 150 MG 24 hr capsule, OFFICE/OUTPT        VISIT,EST,LEVL III          (E03.9) Acquired hypothyroidism  Comment: Euthyroid clinically. Continue current meds. Check TSH.   Plan: levothyroxine (SYNTHROID/LEVOTHROID) 150 MCG         tablet, TSH with free T4 reflex, OFFICE/OUTPT         VISIT,EST,LEVL III          (N32.81) Overactive bladder  Comment: Stable. Continue current meds.   Plan: mirabegron (MYRBETRIQ) 50 MG 24 hr tablet,         OFFICE/OUTPT VISIT,EST,LEVL III          (I48.20) Chronic atrial fibrillation  Comment: Stable. Continue current meds.   Plan: propafenone (RYTHMOL) 150 MG TABS tablet,         OFFICE/OUTPT VISIT,EST,LEVL III          (Z79.01) Current use of long term anticoagulation  Plan: warfarin ANTICOAGULANT (COUMADIN) 5 MG tablet          (E78.5) Hyperlipidemia LDL goal <130  Comment: Update lipids. Continue current meds.   Plan: simvastatin (ZOCOR) 40 MG tablet, Comprehensive        metabolic panel, Lipid panel reflex to direct         LDL Fasting, OFFICE/OUTPT VISIT,EST,LEVL III          (F51.01) Primary insomnia  Comment: Stable. Continue current meds.   Plan: traZODone (DESYREL) 50 MG tablet, OFFICE/OUTPT         VISIT,EST,LEVL III          (M62.830) Back muscle spasm  Plan: diazepam (VALIUM) 5 MG tablet          (Z23) Need for prophylactic vaccination and inoculation against influenza  Plan: INFLUENZA QUAD, RECOMBINANT,  "P-FREE (RIV4)         (FLUBLOCK) [10661]          (F10.21) Alcohol dependence in remission (H)  Comment: Abstinent for years.     (Z12.5) Screening PSA (prostate specific antigen)  Plan: Prostate spec antigen screen            COUNSELING:   Reviewed preventive health counseling, as reflected in patient instructions    Estimated body mass index is 41.76 kg/m  as calculated from the following:    Height as of 5/20/19: 1.854 m (6' 1\").    Weight as of 5/20/19: 143.6 kg (316 lb 8 oz).     Weight management plan: Discussed healthy diet and exercise guidelines     reports that he has never smoked. He has never used smokeless tobacco.      Counseling Resources:  ATP IV Guidelines  Pooled Cohorts Equation Calculator  FRAX Risk Assessment  ICSI Preventive Guidelines  Dietary Guidelines for Americans, 2010  USDA's MyPlate  ASA Prophylaxis  Lung CA Screening    Obdulio Ingram MD,   [facility]  "

## 2019-10-29 NOTE — PATIENT INSTRUCTIONS
Everything looks fine!    Refills of medications have been faxed to your pharmacy.     I'll get back to you with lab results soon, especially if there is anything of concern.      See me in six months for a medication check.

## 2019-10-29 NOTE — NURSING NOTE
"Chief Complaint   Patient presents with     Physical     fasting     Imm/Inj     Flu Shot     initial /70 (BP Location: Right arm, Patient Position: Sitting, Cuff Size: Adult Large)   Pulse 73   Temp 97.7  F (36.5  C) (Oral)   Resp 18   Ht 1.854 m (6' 1\")   Wt 141.5 kg (312 lb)   SpO2 96%   BMI 41.16 kg/m   Estimated body mass index is 41.16 kg/m  as calculated from the following:    Height as of this encounter: 1.854 m (6' 1\").    Weight as of this encounter: 141.5 kg (312 lb)..  bp completed using cuff size large    "

## 2019-10-29 NOTE — LETTER
[facility]  10/29/19    Patient: Todd S Aschoff  YOB: 1955  Medical Record Number: [id number]  CSN: [id number]                                                                              Non-opioid Controlled Substance Agreement    I understand that my care provider has prescribed a controlled substance to help manage my condition(s). I am taking this medicine to help me function or work. I know this is strong medicine, and that it can cause serious side effects. Controlled substances can be sedating, addicting and may cause a dependency on the drug. They can affect my ability to drive or think, and cause depression. They need to be taken exactly as prescribed. Combining controlled substances with certain medicines or chemicals (such as cocaine, sedatives and tranquilizers, sleeping pills, meth) can be dangerous or even fatal. Also, if I stop controlled substances suddenly, I may have severe withdrawal symptoms.  If not helpful, I may be asked to stop them.    The risks, benefits, and side effects of these medicine(s) were explained to me. I agree that:    1. I will take part in other treatments as advised by my care team. This may be psychiatry or counseling, physical therapy, behavioral therapy, group treatment or a referral to a pain clinic. I will reduce or stop my medicine when my care team tells me to do so.  2. I will take my medicines as prescribed. I will not change the dose or schedule unless my care team tells me to. There will be no refills if I  run out early.   I may be contactedwithout warning and asked to complete a urine drug test or pill count at any time.   3. I will keep all my appointments, and understand this is part of the monitoring of controlled substances. My care team may require an office visit for EVERY controlled substance refill. If I miss appointments or don t follow instructions, my care team may stop my medicine.  4. I will not ask other providers to  prescribe controlled substances, and I will not accept controlled substances from other people. If I need another prescribed controlled substance for a new reason, I will tell my care team within 1 business day.  5. I will use one pharmacy to fill all of my controlled substance prescriptions, and it is up to me to make sure that I do not run out of my medicines on weekends or holidays. If my care team is willing to refill my controlled substance prescription without a visit, I must request refills only during office hours, refills may take up to 3 days to process, and it may take up to 5 to 7 days for my medicine to be mailed and ready at my pharmacy. Prescriptions will not be mailed anywhere except my pharmacy.    6. I am responsible for my prescriptions. If the medicine/prescription is lost or stolen, it will not be replaced. I also agree not to share controlled substance medicines with anyone.              [facility]  10/29/19  Patient:  Todd S Aschoff  YOB: 1955  Medical Record Number: [id number]  CSN: [id number]    7. I agree to not use ANY illegal or recreational drugs. This includes marijuana, cocaine, bath salts or other drugs. I agree not to use alcohol unless my care team says I may. I agree to give urine samples whenever asked. If I don t give a urine sample, the care team may stop my medicine.    8. If I enroll in the [place] Medical Marijuana program, I will tell my care team. I will also sign an agreement to share my medical records with my care team.    9. I will bring in my list of medicines (or my medicine bottles) each time I come to the clinic.   10. I will tell my care team right away if I become pregnant or have a new medical problem treated outside of my regular clinic.  11. I understand that this medicine can affect my thinking and judgment. It may be unsafe for me to drive, use machinery and do dangerous tasks. I will not do any of these things until I know how  the medicine affects me. If my dose changes, I will wait to see how it affects me. I will contact my care team if I have concerns about medicine side effects.    I understand that if I do not follow any of the conditions above, my prescriptions or treatment may be stopped.      I agree that my provider, clinic care team, and pharmacy may work with any city, state or federal law enforcement agency that investigates the misuse, sale, or other diversion of my controlled medicine. I will allow my provider to discuss my care with or share a copy of this agreement with any other treating provider, pharmacy or emergency room where I receive care. I agree to give up (waive) any right of privacy or confidentiality with respect to these consents.   I have read this agreement and have asked questions about anything I did not understand.    ____________________________________________________    ____________  ________  Patient signature                                                         Date      Time    ____________________________________________________     ____________  ________  Witness                                                          Date      Time    ____________________________________________________  Provider signature

## 2019-10-30 LAB
ALBUMIN SERPL-MCNC: 3.8 G/DL (ref 3.4–5)
ALP SERPL-CCNC: 69 U/L (ref 40–150)
ALT SERPL W P-5'-P-CCNC: 39 U/L (ref 0–70)
ANION GAP SERPL CALCULATED.3IONS-SCNC: 5 MMOL/L (ref 3–14)
AST SERPL W P-5'-P-CCNC: 22 U/L (ref 0–45)
BILIRUB SERPL-MCNC: 1.8 MG/DL (ref 0.2–1.3)
BUN SERPL-MCNC: 16 MG/DL (ref 7–30)
CALCIUM SERPL-MCNC: 8.6 MG/DL (ref 8.5–10.1)
CHLORIDE SERPL-SCNC: 105 MMOL/L (ref 94–109)
CHOLEST SERPL-MCNC: 200 MG/DL
CO2 SERPL-SCNC: 29 MMOL/L (ref 20–32)
CREAT SERPL-MCNC: 1.04 MG/DL (ref 0.66–1.25)
GFR SERPL CREATININE-BSD FRML MDRD: 76 ML/MIN/{1.73_M2}
GLUCOSE SERPL-MCNC: 103 MG/DL (ref 70–99)
HDLC SERPL-MCNC: 51 MG/DL
LDLC SERPL CALC-MCNC: 114 MG/DL
NONHDLC SERPL-MCNC: 149 MG/DL
POTASSIUM SERPL-SCNC: 4.4 MMOL/L (ref 3.4–5.3)
PROT SERPL-MCNC: 7.1 G/DL (ref 6.8–8.8)
PSA SERPL-ACNC: 6.28 UG/L (ref 0–4)
SODIUM SERPL-SCNC: 139 MMOL/L (ref 133–144)
T4 FREE SERPL-MCNC: 0.87 NG/DL (ref 0.76–1.46)
TRIGL SERPL-MCNC: 174 MG/DL
TSH SERPL DL<=0.005 MIU/L-ACNC: 6 MU/L (ref 0.4–4)

## 2019-10-30 ASSESSMENT — ANXIETY QUESTIONNAIRES: GAD7 TOTAL SCORE: 0

## 2019-11-12 ENCOUNTER — ANTICOAGULATION THERAPY VISIT (OUTPATIENT)
Dept: NURSING | Facility: CLINIC | Age: 64
End: 2019-11-12
Payer: COMMERCIAL

## 2019-11-12 DIAGNOSIS — Z95.2 AORTIC VALVE PROSTHESIS PRESENT: ICD-10-CM

## 2019-11-12 DIAGNOSIS — Z79.01 LONG TERM CURRENT USE OF ANTICOAGULANT THERAPY: Primary | ICD-10-CM

## 2019-11-12 DIAGNOSIS — I48.91 ATRIAL FIBRILLATION (H): ICD-10-CM

## 2019-11-12 LAB — INR POINT OF CARE: 3.6 (ref 0.86–1.14)

## 2019-11-12 PROCEDURE — 85610 PROTHROMBIN TIME: CPT | Mod: QW

## 2019-11-12 PROCEDURE — 36416 COLLJ CAPILLARY BLOOD SPEC: CPT

## 2019-11-12 NOTE — PROGRESS NOTES
ANTICOAGULATION FOLLOW-UP CLINIC VISIT    Patient Name:  Todd S Aschoff  Date:  11/12/2019  Contact Type:  Face to Face    SUBJECTIVE:  Patient Findings     Comments:   The patient was assessed for   diet, medication,   missed or extra doses,   bruising or bleeding,   with no problem findings.          Clinical Outcomes     Comments:   The patient was assessed for   diet, medication,   missed or extra doses,   bruising or bleeding,   with no problem findings.             OBJECTIVE    INR Protime   Date Value Ref Range Status   11/12/2019 3.6 (A) 0.86 - 1.14 Final       ASSESSMENT / PLAN  INR assessment SUPRA    Recheck INR In: 4 WEEKS    INR Location Clinic      Anticoagulation Summary  As of 11/12/2019    INR goal:   2.5-3.5   TTR:   61.6 % (3.4 y)   INR used for dosing:   3.6! (11/12/2019)   Warfarin maintenance plan:   7.5 mg (5 mg x 1.5) every Sun, Thu; 5 mg (5 mg x 1) all other days   Full warfarin instructions:   7.5 mg every Sun, Thu; 5 mg all other days   Weekly warfarin total:   40 mg   No change documented:   Nova John RN   Plan last modified:   Nova John RN (12/27/2018)   Next INR check:   12/10/2019   Priority:   INR   Target end date:   Indefinite    Indications    Aortic valve prosthesis present [Z95.2]  Atrial fibrillation (H) [I48.91]  Long term current use of anticoagulant therapy [Z79.01]             Anticoagulation Episode Summary     INR check location:       Preferred lab:       Send INR reminders to:   AIDA GUERRERO    Comments:   5mg tabs / CALENDAR      Anticoagulation Care Providers     Provider Role Specialty Phone number    Obdulio Ingram MD [facility] Internal Medicine [phone]            See the Encounter Report to view Anticoagulation Flowsheet and Dosing Calendar (Go to Encounters tab in chart review, and find the Anticoagulation Therapy Visit)    INR is therapeutic today.   Patient will continue same maintenance dose.   Follow up in 4 weeks.        Nova  SHYANN John

## 2019-11-14 DIAGNOSIS — G89.29 CHRONIC LOW BACK PAIN WITH SCIATICA, SCIATICA LATERALITY UNSPECIFIED, UNSPECIFIED BACK PAIN LATERALITY: ICD-10-CM

## 2019-11-14 DIAGNOSIS — M54.40 CHRONIC LOW BACK PAIN WITH SCIATICA, SCIATICA LATERALITY UNSPECIFIED, UNSPECIFIED BACK PAIN LATERALITY: ICD-10-CM

## 2019-11-14 NOTE — TELEPHONE ENCOUNTER
Requested Prescriptions   Pending Prescriptions Disp Refills     LYRICA 100 MG capsule [Pharmacy Med Name: Lyrica Oral Capsule 100 MG] 270 capsule 0     Sig: Take 1 capsule (100 mg) by mouth 3 times daily Do not take if sleepy/sedated.       There is no refill protocol information for this order      Last Written Prescription Date:  10/29/2019  Last Fill Quantity: 270,  # refills: 0   Last office visit: 10/29/2019 with prescribing provider:     Future Office Visit:

## 2019-11-15 RX ORDER — PREGABALIN 100 MG
CAPSULE ORAL
Qty: 270 CAPSULE | Refills: 0 | OUTPATIENT
Start: 2019-11-15

## 2019-11-24 DIAGNOSIS — F33.42 MAJOR DEPRESSIVE DISORDER, RECURRENT EPISODE, IN FULL REMISSION (H): ICD-10-CM

## 2019-11-25 NOTE — TELEPHONE ENCOUNTER
"Requested Prescriptions   Pending Prescriptions Disp Refills     venlafaxine (EFFEXOR-XR) 150 MG 24 hr capsule [Pharmacy Med Name: Venlafaxine HCl ER Oral Capsule Extended Release 24 Hour 150 MG] 90 capsule 0     Sig: TAKE ONE CAPSULE BY MOUTH DAILY WITH BREAKFAST   Last Written Prescription Date:  10/29/2019  Last Fill Quantity: 90,  # refills: 0   Last office visit: 10/29/2019 with prescribing provider:     Future Office Visit:      Serotonin-Norepinephrine Reuptake Inhibitors  Passed - 11/24/2019  1:12 PM        Passed - Blood pressure under 140/90 in past 12 months     BP Readings from Last 3 Encounters:   10/29/19 110/70   05/20/19 (!) 144/92   11/12/18 121/85                 Passed - PHQ-9 score of less than 5 in past 6 months     Please review last PHQ-9 score.           Passed - Medication is active on med list        Passed - Patient is age 18 or older        Passed - Normal serum creatinine on file in past 12 months     Recent Labs   Lab Test 10/29/19  1024   CR 1.04             Passed - Recent (6 mo) or future (30 days) visit within the authorizing provider's specialty     Patient had office visit in the last 6 months or has a visit in the next 30 days with authorizing provider or within the authorizing provider's specialty.  See \"Patient Info\" tab in inbasket, or \"Choose Columns\" in Meds & Orders section of the refill encounter.            "

## 2019-11-26 RX ORDER — VENLAFAXINE HYDROCHLORIDE 150 MG/1
CAPSULE, EXTENDED RELEASE ORAL
Qty: 90 CAPSULE | Refills: 0 | OUTPATIENT
Start: 2019-11-26

## 2019-11-26 NOTE — TELEPHONE ENCOUNTER
"PHQ-9 score:    PHQ-9 SCORE 10/29/2019   PHQ-9 Total Score -   PHQ-9 Total Score 3       Per primary care provider's last office visit dictation 10/29/19:  \"Depression:  Appears to remain in remission. Antidepressant medications are being refilled by psychiatry.\"    Medication refused with reason: psych to refill medication.      "

## 2019-11-29 DIAGNOSIS — F33.42 MAJOR DEPRESSIVE DISORDER, RECURRENT EPISODE, IN FULL REMISSION (H): ICD-10-CM

## 2019-11-29 NOTE — TELEPHONE ENCOUNTER
"Requested Prescriptions   Pending Prescriptions Disp Refills     venlafaxine (EFFEXOR-XR) 150 MG 24 hr capsule [Pharmacy Med Name:  Last Written Prescription Date:  10/29/2019  Last Fill Quantity: 90,  # refills: 0   Last office visit: 10/29/2019 with prescribing provider:     Future Office Visit:   Venlafaxine HCl ER Oral Capsule Extended Release 24 Hour 150 MG] 90 capsule 0     Sig: TAKE ONE CAPSULE BY MOUTH DAILY WITH BREAKFAST       Serotonin-Norepinephrine Reuptake Inhibitors  Passed - 11/29/2019  1:15 PM        Passed - Blood pressure under 140/90 in past 12 months     BP Readings from Last 3 Encounters:   10/29/19 110/70   05/20/19 (!) 144/92   11/12/18 121/85                 Passed - PHQ-9 score of less than 5 in past 6 months     Please review last PHQ-9 score.           Passed - Medication is active on med list        Passed - Patient is age 18 or older        Passed - Normal serum creatinine on file in past 12 months     Recent Labs   Lab Test 10/29/19  1024   CR 1.04             Passed - Recent (6 mo) or future (30 days) visit within the authorizing provider's specialty     Patient had office visit in the last 6 months or has a visit in the next 30 days with authorizing provider or within the authorizing provider's specialty.  See \"Patient Info\" tab in inbasket, or \"Choose Columns\" in Meds & Orders section of the refill encounter.            "

## 2019-12-02 RX ORDER — VENLAFAXINE HYDROCHLORIDE 150 MG/1
CAPSULE, EXTENDED RELEASE ORAL
Qty: 90 CAPSULE | Refills: 0 | Status: SHIPPED | OUTPATIENT
Start: 2019-12-02 | End: 2020-03-03

## 2019-12-02 NOTE — TELEPHONE ENCOUNTER
Pt calling to check status of refill request below. He stated that he cannot get into his psychiatrist and that Dr. Ingram filled the medication last. He also stated that the pharmacy never received the prescription sent 10/29/19. Pt can be reached at [phone]. OK to leave a detailed message. Please advise. Thanks.

## 2019-12-02 NOTE — TELEPHONE ENCOUNTER
Since primary care provider has refilled medication in the past (most recent 8/4/19), medication refilled x 1.

## 2019-12-10 ENCOUNTER — ANTICOAGULATION THERAPY VISIT (OUTPATIENT)
Dept: NURSING | Facility: CLINIC | Age: 64
End: 2019-12-10
Payer: COMMERCIAL

## 2019-12-10 DIAGNOSIS — I48.91 ATRIAL FIBRILLATION (H): ICD-10-CM

## 2019-12-10 DIAGNOSIS — Z95.2 AORTIC VALVE PROSTHESIS PRESENT: ICD-10-CM

## 2019-12-10 DIAGNOSIS — Z79.01 LONG TERM CURRENT USE OF ANTICOAGULANT THERAPY: Primary | ICD-10-CM

## 2019-12-10 LAB — INR POINT OF CARE: 3.3 (ref 0.86–1.14)

## 2019-12-10 PROCEDURE — 85610 PROTHROMBIN TIME: CPT | Mod: QW

## 2019-12-10 PROCEDURE — 36416 COLLJ CAPILLARY BLOOD SPEC: CPT

## 2019-12-10 NOTE — PROGRESS NOTES
ANTICOAGULATION FOLLOW-UP CLINIC VISIT    Patient Name:  Todd S Aschoff  Date:  12/10/2019  Contact Type:  Face to Face    SUBJECTIVE:  Patient Findings     Comments:   The patient was assessed for   diet, medication,   missed or extra doses,   bruising or bleeding,   with no problem findings.          Clinical Outcomes     Comments:   The patient was assessed for   diet, medication,   missed or extra doses,   bruising or bleeding,   with no problem findings.             OBJECTIVE    INR Protime   Date Value Ref Range Status   12/10/2019 3.3 (A) 0.86 - 1.14 Final       ASSESSMENT / PLAN  INR assessment THER    Recheck INR In: 4 WEEKS    INR Location Clinic      Anticoagulation Summary  As of 12/10/2019    INR goal:   2.5-3.5   TTR:   79.0 % (1 y)   INR used for dosing:   3.3 (12/10/2019)   Warfarin maintenance plan:   7.5 mg (5 mg x 1.5) every Sun, Thu; 5 mg (5 mg x 1) all other days   Full warfarin instructions:   7.5 mg every Sun, Thu; 5 mg all other days   Weekly warfarin total:   40 mg   No change documented:   Nova John RN   Plan last modified:   Nova John RN (12/27/2018)   Next INR check:   1/7/2020   Priority:   INR   Target end date:   Indefinite    Indications    Aortic valve prosthesis present [Z95.2]  Atrial fibrillation (H) [I48.91]  Long term current use of anticoagulant therapy [Z79.01]             Anticoagulation Episode Summary     INR check location:       Preferred lab:       Send INR reminders to:   AIDA GUERRERO    Comments:   5mg tabs / CALENDAR      Anticoagulation Care Providers     Provider Role Specialty Phone number    Obdulio Ingram MD [facility] Internal Medicine [phone]            See the Encounter Report to view Anticoagulation Flowsheet and Dosing Calendar (Go to Encounters tab in chart review, and find the Anticoagulation Therapy Visit)    INR is therapeutic today.   Patient will continue same maintenance dose.   Follow up in 4 weeks.        Nova  SHYANN John

## 2019-12-30 DIAGNOSIS — N31.9 NEUROGENIC BLADDER: Primary | ICD-10-CM

## 2019-12-31 ENCOUNTER — OFFICE VISIT (OUTPATIENT)
Dept: UROLOGY | Facility: CLINIC | Age: 64
End: 2019-12-31
Payer: COMMERCIAL

## 2019-12-31 VITALS
DIASTOLIC BLOOD PRESSURE: 84 MMHG | BODY MASS INDEX: 38.76 KG/M2 | HEIGHT: 74 IN | SYSTOLIC BLOOD PRESSURE: 130 MMHG | OXYGEN SATURATION: 98 % | WEIGHT: 302 LBS | HEART RATE: 76 BPM

## 2019-12-31 DIAGNOSIS — N31.9 NEUROGENIC BLADDER: ICD-10-CM

## 2019-12-31 DIAGNOSIS — R97.20 ELEVATED PROSTATE SPECIFIC ANTIGEN (PSA): Primary | ICD-10-CM

## 2019-12-31 LAB
ALBUMIN UR-MCNC: NEGATIVE MG/DL
APPEARANCE UR: CLEAR
BILIRUB UR QL STRIP: ABNORMAL
COLOR UR AUTO: YELLOW
GLUCOSE UR STRIP-MCNC: NEGATIVE MG/DL
HGB UR QL STRIP: NEGATIVE
KETONES UR STRIP-MCNC: NEGATIVE MG/DL
LEUKOCYTE ESTERASE UR QL STRIP: NEGATIVE
NITRATE UR QL: NEGATIVE
PH UR STRIP: 5 PH (ref 5–7)
RESIDUAL VOLUME (RV) (EXTERNAL): 63
SOURCE: ABNORMAL
SP GR UR STRIP: >1.03 (ref 1–1.03)
UROBILINOGEN UR STRIP-ACNC: 0.2 EU/DL (ref 0.2–1)

## 2019-12-31 PROCEDURE — 99213 OFFICE O/P EST LOW 20 MIN: CPT | Mod: 25 | Performed by: UROLOGY

## 2019-12-31 PROCEDURE — 51798 US URINE CAPACITY MEASURE: CPT | Performed by: UROLOGY

## 2019-12-31 PROCEDURE — 81003 URINALYSIS AUTO W/O SCOPE: CPT | Mod: QW | Performed by: UROLOGY

## 2019-12-31 ASSESSMENT — PAIN SCALES - GENERAL: PAINLEVEL: NO PAIN (0)

## 2019-12-31 ASSESSMENT — MIFFLIN-ST. JEOR: SCORE: 2230.64

## 2019-12-31 NOTE — LETTER
12/31/2019       RE: Todd S Aschoff  [address]  [place] [address]     Dear Colleague,    Thank you for referring your patient, Todd S Aschoff, to the [facility] [place] [facility]. Please see a copy of my visit note below.    Nicko is a 64-year-old male with a neurogenic bladder and a father who had prostate cancer.  His PSA a year and a half ago was normal at 3.85.  In October of this year his PSA was over 6.0 probably-due to a digital rectal exam performed before the blood draw.  Previous prostate biopsies showed inflammation most recently and a few years ago showed atypical cells.  Other past medical history: History of alcohol dependence, aortic valve prosthesis, atrial fibrillation, chronic prostatitis, BPH, chronic pain, aortic dissection, headaches, spinal cord injury, depression, hyperlipidemia, osteoarthritis, obesity, hypothyroidism, non-smoker  Medications: Tylenol, Tylenol arthritis formula, azelastine spray, Zyrtec, Flexeril, Valium, Aricept, Tenex, Midrin, Synthroid, Myrbetriq 50 mg, nicotine lozenge, Lyrica, Rythmol, Senokot, Zocor, trazodone, Effexor XR, Coumadin  Allergies: None  Review of systems: Not a candidate for back surgery-has had more than one opinion  No dysuria or hematuria  Wets bed still  Exam: Alert and oriented, morbid obesity, normal vital signs, normal respirations  Normal sphincter tone, no rectal mass or impaction, benign and symmetric prostate, normal seminal vesicles  Assessment: Neurogenic bladder, family history of prostate cancer, PSA elevated- probably due to rectal exam  Plan: Repeat PSA in 2 weeks with no ejaculations 5 days prior.  See Naina Mock MD for consideration of InterStim or Botox injections -discussed again     Again, thank you for allowing me to participate in the care of your patient.      Sincerely,    Geraldo Gonzales MD

## 2019-12-31 NOTE — PROGRESS NOTES
Nicko is a 64-year-old male with a neurogenic bladder and a father who had prostate cancer.  His PSA a year and a half ago was normal at 3.85.  In October of this year his PSA was over 6.0 probably-due to a digital rectal exam performed before the blood draw.  Previous prostate biopsies showed inflammation most recently and a few years ago showed atypical cells.  Other past medical history: History of alcohol dependence, aortic valve prosthesis, atrial fibrillation, chronic prostatitis, BPH, chronic pain, aortic dissection, headaches, spinal cord injury, depression, hyperlipidemia, osteoarthritis, obesity, hypothyroidism, non-smoker  Medications: Tylenol, Tylenol arthritis formula, azelastine spray, Zyrtec, Flexeril, Valium, Aricept, Tenex, Midrin, Synthroid, Myrbetriq 50 mg, nicotine lozenge, Lyrica, Rythmol, Senokot, Zocor, trazodone, Effexor XR, Coumadin  Allergies: None  Review of systems: Not a candidate for back surgery-has had more than one opinion  No dysuria or hematuria  Wets bed still  Exam: Alert and oriented, morbid obesity, normal vital signs, normal respirations  Normal sphincter tone, no rectal mass or impaction, benign and symmetric prostate, normal seminal vesicles  Assessment: Neurogenic bladder, family history of prostate cancer, PSA elevated- probably due to rectal exam  Plan: Repeat PSA in 2 weeks with no ejaculations 5 days prior.  See aNina Mock MD for consideration of InterStim or Botox injections -discussed again

## 2019-12-31 NOTE — NURSING NOTE
Chief Complaint   Patient presents with     annual follow up     elevated PSA   Patients PVR was 63 ml today.

## 2020-01-06 ENCOUNTER — TRANSFERRED RECORDS (OUTPATIENT)
Dept: HEALTH INFORMATION MANAGEMENT | Facility: CLINIC | Age: 65
End: 2020-01-06

## 2020-01-09 ENCOUNTER — TELEPHONE (OUTPATIENT)
Dept: INTERNAL MEDICINE | Facility: CLINIC | Age: 65
End: 2020-01-09

## 2020-01-09 DIAGNOSIS — Z95.2 AORTIC VALVE PROSTHESIS PRESENT: ICD-10-CM

## 2020-01-09 DIAGNOSIS — I48.91 ATRIAL FIBRILLATION (H): Primary | ICD-10-CM

## 2020-01-09 DIAGNOSIS — Z79.01 LONG TERM CURRENT USE OF ANTICOAGULANT THERAPY: ICD-10-CM

## 2020-01-09 NOTE — TELEPHONE ENCOUNTER
Patient no-showed INR appointment on Tuesday 1/7/20.  Called and spoke to him.  He was diagnosed with pneumonia on Monday 1/6/20 and is still feeling poorly.  He has an upcoming lab appointment on Tuesday 1/14/20 and he agreed to have his INR checked then.    Future INR lab ordered.    Nova John RN  [facility] [facility]

## 2020-01-14 DIAGNOSIS — R97.20 ELEVATED PROSTATE SPECIFIC ANTIGEN (PSA): ICD-10-CM

## 2020-01-14 DIAGNOSIS — I48.91 ATRIAL FIBRILLATION (H): ICD-10-CM

## 2020-01-14 DIAGNOSIS — Z79.01 LONG TERM CURRENT USE OF ANTICOAGULANT THERAPY: ICD-10-CM

## 2020-01-14 LAB — INR PPP: 3.23 (ref 0.86–1.14)

## 2020-01-14 PROCEDURE — 84153 ASSAY OF PSA TOTAL: CPT | Performed by: INTERNAL MEDICINE

## 2020-01-14 PROCEDURE — 36415 COLL VENOUS BLD VENIPUNCTURE: CPT | Performed by: INTERNAL MEDICINE

## 2020-01-14 PROCEDURE — 85610 PROTHROMBIN TIME: CPT | Performed by: INTERNAL MEDICINE

## 2020-01-15 ENCOUNTER — TELEPHONE (OUTPATIENT)
Dept: INTERNAL MEDICINE | Facility: CLINIC | Age: 65
End: 2020-01-15

## 2020-01-15 DIAGNOSIS — Z79.01 LONG TERM CURRENT USE OF ANTICOAGULANT THERAPY: ICD-10-CM

## 2020-01-15 DIAGNOSIS — Z95.2 AORTIC VALVE PROSTHESIS PRESENT: ICD-10-CM

## 2020-01-15 DIAGNOSIS — I48.91 ATRIAL FIBRILLATION (H): ICD-10-CM

## 2020-01-15 LAB — PSA SERPL-MCNC: 6.93 UG/L (ref 0–4)

## 2020-01-15 NOTE — TELEPHONE ENCOUNTER
LMTCB.    INR 3.23  Goal: 2.5-3.5  Current dose: 7.5 mg Sun/Th and 5 mg ROW    Patient diagnosed with pneumonia on 1/6/20 and was on doxycycline x 7 days. Last dose likely 1/12. Per tele note on 1/9 was still not feeling well at that time. Need to assess further.    Jessi Linder RN on 1/15/2020 at 10:19 AM

## 2020-01-16 NOTE — TELEPHONE ENCOUNTER
Anticoagulation Management    Unable to reach Nicko today.    Left message to call INR clinic to assess for changes/dosing    Patient advised to take 5 mg tonight if message received after hours.      Anticoagulation clinic to follow up    Britta Snowden RN

## 2020-01-17 NOTE — TELEPHONE ENCOUNTER
Left message for pt to call back to discuss INR from Tuesday, relayed importance to talk yet today.

## 2020-01-20 NOTE — TELEPHONE ENCOUNTER
ANTICOAGULATION MANAGEMENT     Patient Name:  Todd S Aschoff  Date:  1/20/2020    ASSESSMENT /SUBJECTIVE:      Last Tuesday's INR result of 3.23 is therapeutic. Goal INR of 2.5-3.5      Warfarin dose taken: Unable to assess, patient did not return the call despite multiple VM messages left    Diet: Unable to assess    Medication changes/ interactions: Unable to assess currently, noted as below patient was recently on doxycycline    Previous INR: Therapeutic     S/S of bleeding or thromboembolism: Unable to assess    New injury or illness:  Recent pneumonia    Upcoming surgery, procedure or cardioversion:  No    Additional findings: none per chart review      PLAN:    Left detailed message for Nicko regarding INR result and instructed:     Warfarin Dosing Instructions: Continue your current warfarin dose    7.5 mg every Sun, Thu; 5 mg all other days     Asked patient to return my call if this is not the warfarin dose he is currently taking    Instructed patient to follow up no later than: 2-4 weeks, depending on how he is feeling. Advising sooner recheck if he is still ill or has had other mediation changes. Longer recheck okay as long as he is feeling back to baseline.  Left detailed message with recommended recheck date    Education provided: No    Instructed to call the Anticoagulation Clinic for any changes, questions or concerns. (#[phone])        OBJECTIVE:  INR   Date Value Ref Range Status   01/14/2020 3.23 (H) 0.86 - 1.14 Final             Anticoagulation Summary  As of 1/15/2020    INR goal:   2.5-3.5   TTR:   85.2 % (1 y)   INR used for dosing:   3.23 (1/14/2020)   Warfarin maintenance plan:   7.5 mg (5 mg x 1.5) every Sun, Thu; 5 mg (5 mg x 1) all other days   Full warfarin instructions:   7.5 mg every Sun, Thu; 5 mg all other days   Weekly warfarin total:   40 mg   Plan last modified:   Nova John RN (12/27/2018)   Next INR check:   2/11/2020   Priority:   High   Target end date:   Indefinite     Indications    Aortic valve prosthesis present [Z95.2]  Atrial fibrillation (H) [I48.91]  Long term current use of anticoagulant therapy [Z79.01]             Anticoagulation Episode Summary     INR check location:       Preferred lab:       Send INR reminders to:   AIDA GUERRERO    Comments:   5mg tabs / CALENDAR / INR referral 7/9/19      Anticoagulation Care Providers     Provider Role Specialty Phone number    Obdulio Ingram MD [facility] Internal Medicine [phone]

## 2020-01-22 ENCOUNTER — OFFICE VISIT (OUTPATIENT)
Dept: UROLOGY | Facility: CLINIC | Age: 65
End: 2020-01-22
Payer: COMMERCIAL

## 2020-01-22 ENCOUNTER — HOSPITAL ENCOUNTER (OUTPATIENT)
Facility: CLINIC | Age: 65
End: 2020-01-22
Attending: INTERNAL MEDICINE | Admitting: INTERNAL MEDICINE
Payer: COMMERCIAL

## 2020-01-22 ENCOUNTER — TELEPHONE (OUTPATIENT)
Dept: INTERNAL MEDICINE | Facility: CLINIC | Age: 65
End: 2020-01-22

## 2020-01-22 VITALS
HEIGHT: 74 IN | HEART RATE: 68 BPM | BODY MASS INDEX: 40.04 KG/M2 | OXYGEN SATURATION: 94 % | DIASTOLIC BLOOD PRESSURE: 78 MMHG | SYSTOLIC BLOOD PRESSURE: 130 MMHG | WEIGHT: 312 LBS

## 2020-01-22 DIAGNOSIS — Z79.01 LONG TERM CURRENT USE OF ANTICOAGULANT THERAPY: ICD-10-CM

## 2020-01-22 DIAGNOSIS — I48.20 CHRONIC ATRIAL FIBRILLATION (H): Primary | ICD-10-CM

## 2020-01-22 DIAGNOSIS — Z80.42 FAMILY HISTORY OF PROSTATE CANCER: ICD-10-CM

## 2020-01-22 DIAGNOSIS — Z95.2 AORTIC VALVE PROSTHESIS PRESENT: ICD-10-CM

## 2020-01-22 DIAGNOSIS — N32.81 OAB (OVERACTIVE BLADDER): Primary | ICD-10-CM

## 2020-01-22 DIAGNOSIS — I48.91 ATRIAL FIBRILLATION (H): ICD-10-CM

## 2020-01-22 DIAGNOSIS — R97.20 ELEVATED PROSTATE SPECIFIC ANTIGEN (PSA): ICD-10-CM

## 2020-01-22 PROCEDURE — 99213 OFFICE O/P EST LOW 20 MIN: CPT | Performed by: UROLOGY

## 2020-01-22 ASSESSMENT — PAIN SCALES - GENERAL: PAINLEVEL: MODERATE PAIN (5)

## 2020-01-22 ASSESSMENT — MIFFLIN-ST. JEOR: SCORE: 2276

## 2020-01-22 NOTE — TELEPHONE ENCOUNTER
I believe that he will require Lovenox bridging for both procedures.    Will route to [place] [facility] RN--please confirm as to whether he has had Lovenox bridging for procedures done in past few years (most recent Lovenox Rx on medication review was around 2010 on my review).

## 2020-01-22 NOTE — LETTER
January 24, 2020    Todd S Aschoff  [address] [facility] [address]      Nicko,     As discussed, here is a copy of your upcoming Coumadin hold/Lovenox Injection schedule for your upcoming procedures.         Date 2020 Day warfarin lovenox INR check   1/23 Thurs 7.5 mg       1/24 Fri 1/25 Sat   PM only     1/26 Sun   every 12 hours     1/27 Mon   every 12 hours     1/28 Tues   AM only     1/29 Wed 1/30 Thurs   PM only     1/31 Fri   every 12 hours     2/1 Sat   every 12 hours     2/2 Sun   every 12 hours     2/3 Mon   every 12 hours     2/4 Tues   every 12 hours     2/5 Wed   every 12 hours     2/6 Thurs   every 12 hours     2/7 Fri   every 12 hours     2/8 Sat   every 12 hours     2/9 Sun   every 12 hours     2/10 Mon   every 12 hours     2/11 Tues   every 12 hours     2/12 Wed   every 12 hours     2/13 Thurs   every 12 hours     2/14 Fri   every 12 hours     2/15 Sat   every 12 hours     2/16 Sun   AM only     2/17 Mon 2/18 Tues 7.5 mg PM only     2/19 Wed 7.5 mg every 12 hours     2/20 Thurs 7.5 mg every 12 hours     2/21 Fri 7.5 mg every 12 hours     2/22 Sat 7.5 mg every 12 hours     2/23 Sun 7.5 mg every 12 hours     2/24 Mon to be determined every 12 hours INR appt      Instructions:     Last warfarin dose: 1/23/2020 1/24/2020, NO warfarin     1/25/2020, NO warfarin, begin enoxaparin injections into the abdomen every 12 hours starting this evening.     1/26/2020, NO warfarin, enoxaparin injections into the abdomen every 12 hours.     1/27/2020, NO warfarin, enoxaparin injections into the abdomen every 12 hours.     1/28/2020, NO warfarin, enoxaparin injection into the abdomen in the morning (no enoxaparin 24 hours prior to surgery)     1/29/2020, DAY OF PROCEDURE, NO enoxaparin, NO warfarin.      1/30/2020, NO warfarin, enoxaparin injection into the abdomen in the evening, unless instructed otherwise by the physician     1/31/2020 through 2/15/2020, NO warfarin, enoxaparin  injections into the abdomen every 12 hours.     2/16/2020, NO warfarin, enoxaparin injection into the abdomen in the morning (no enoxaparin 24 hours prior to surgery)     2/17/2020, DAY OF PROCEDURE, NO enoxaparin, NO warfarin.     2/18/2020, 7.5mg warfarin, enoxaparin injection into the abdomen in the evening, unless instructed otherwise by the physician     2/19/2020, 7.5mg warfarin, enoxaparin injection into the abdomen every 12 hours.     2/20/2020, 7.5mg warfarin, enoxaparin injection into the abdomen every 12 hours.     2/21/2020, 7.5mg warfarin, enoxaparin injection into the abdomen every 12 hours.     2/22/2020, 7.5mg warfarin, enoxaparin injection into the abdomen every 12 hours.     2/23/2020, 7.5mg warfarin, enoxaparin injection into the abdomen every 12 hours.     2/24/2020, enoxaparin injection into the abdomen every 12 hours. Make an appointment to have INR checked.

## 2020-01-22 NOTE — PATIENT INSTRUCTIONS
Please do the MR and follow up with Dr Andersen for the prostate biopsy      It was a pleasure meeting with you today.  Thank you for allowing me and my team the privilege of caring for you today.  YOU are the reason we are here, and I truly hope we provided you with the excellent service you deserve.  Please let us know if there is anything else we can do for you so that we can be sure you are leaving completely satisfied with your care experience.    Ultrasound Guided Prostate Biopsy    What is a prostate biopsy? A prostate biopsy is a relatively painless procedure performed in the physician's office, outpatient facility or hospital.  A long, thin needle is inserted into the prostate to collect a sample of tissue from the prostate.  These samples are then examined by a pathologist for abnormal cells.    Why do I need a prostate biopsy? During a man's lifetime the prostate gradually increases in size.  The patient may or may not experience symptoms.  Symptoms of an enlarged prostate include:    Increased frequency of urination    Decreased force of urinary stream    Trouble with urination    Awakening at night to urinate    When the prostate is examined, the physician may feel a nodule (hard or firm growth) which would require a biopsy.    Another reason for having a prostate biopsy is an increase in the prostate specific androgen (PSA) in your blood.  A prostate biopsy may be necessary to determine the cause of the increased PSA.    Your physician will also perform an ultrasound (an image created by sound waves).  The ultrasound is performed by placing a small probe in the rectum to image the prostate gland.    Preparation for the Prostate Biopsy    1. Blood thinning medications must be stopped prior to your biopsy.  Please stop the following medication the appropriate number of days before:  a. 10 days-acetylsalicylic acid, vitamin E, fish oil, aspirin, baby aspirin  b. 7 days- Plavix, Brilinta, ibuprofen (Advil,  Motrin, Aleve)  c. 5 days-Pradaxa  d. 4 days-Coumadin/warfarin  e. 3 days-Eliquis, Xarelto    2.  If you have any bleeding problems (thin blood), please tell your doctor.    3.  If you have been told by another doctor to take antibiotics before dental work or surgery, please tell the doctor.  This is common for patients that have had a joint replacement.    YOU HAVE BEEN PRESCRIBED AN ANTIBIOTIC.  Please follow the directions written on the bottle.  The directions usually will tell you to start the antibiotic the day before your biopsy.  You will continue taking the medication until it is gone.    The day of the biopsy you will be asked to use an enema one hour before you leave for your appointment.    Unless you have been prescribed a sedative (Valium or a similar drug) you will be able to drive to and from your appointment.  If you have taken a sedative you must have a ride.    AFTER THE BIOPSY    DANGER SIGNS    Small amount of blood in the urine for 10-14 days Excessive blood in the urine, stool or ejaculate  Small amount of blood in the stool for 48 hours Fever over 100 or chills  Small amount of blood in the ejaculate for up to Frequent urination or burning when you urinate   4 weeks          CALL THE DOCTOR'S OFFICE -[phone] IF YOU HAVE ANY OF THESE SYMPTOMS.  IF YOU CANNOT CONTACT THE OFFICE, GO TO THE EMERGENCY ROOM.          Your biopsy is scheduled on____02/17/20___________________________ at our [place] office.  Please be at the office at    ___900am_________________.  A follow up visit has been scheduled for you on _____03/06/20_______________@_______________     at the  _____[place]_________________________office. Your doctor will discuss your results with you at this visit

## 2020-01-22 NOTE — TELEPHONE ENCOUNTER
Patient calling and wants to know if he should go off Warfarin for his colonoscopy scheduled 1/29/2020 and biopsy of prostatescheduled 2/17/2020. Ok to call and lm [phone]

## 2020-01-22 NOTE — LETTER
"1/22/2020       RE: Todd S Aschoff  [address]  [place] [address]     Dear Colleague,    Thank you for referring your patient, Todd S Aschoff, to the [facility] [place] at [facility]. Please see a copy of my visit note below.    January 22, 2020    Return visit    Patient returns today for follow up for his OAB.  Drinks about 20oz of water for the day. Urinates every 2 hours during the day.  No fluids after 6pm.  Was taking trazadone for sleep but even after stopping it he still has had episodes of wake up.  Has a history of back surgery and issues with pain.  Has had negative ROSALIE evaluation but does snore.  History of ETOH use and dry for 11 years, no caffiene.  He was doing well on the mirabegron for a couple years and then around [place] time something changed.  denies any changes in his health since last visit.    Of note patient has elevated PSA last month of 6.3, now almost 7.  Had PIN in 2011 which is the last time he was biopsied.  His father had prostate cancer.  Patient is on anticoagulation.  He is also been asked to do a colonoscopy.    /78   Pulse 68   Ht 1.873 m (6' 1.75\")   Wt 141.5 kg (312 lb)   SpO2 94%   BMI 40.33 kg/m     He is comfortable, in no distress, non-labored breathing.        A/P: 64 year old M with OAB but also elevated PSA, family history of prostate cancer    At this time he needs to have colonoscopy and prostate biopsy to ensure there are not malignancy contributing to his OAB    He briefly met with Dr Andersen today to discuss MRI fusion biopsy (orders placed)    He will return to discuss his bladder symptoms after the further assessment    15 minutes were spent with the patient today, > 50% in counseling and coordination of care    Naina Mock MD MPH   of Urology    CC  Patient Care Team:  Obdulio Ingram MD as PCP - General (Internal Medicine)              "

## 2020-01-22 NOTE — NURSING NOTE
Chief Complaint   Patient presents with     Neurogenic bladder     Patient is here to discuss interstim, and botox treatment options       Alysa Fitzpatrick, EMT

## 2020-01-22 NOTE — PROGRESS NOTES
"January 22, 2020    Return visit    Patient returns today for follow up for his OAB.  Drinks about 20oz of water for the day. Urinates every 2 hours during the day.  No fluids after 6pm.  Was taking trazadone for sleep but even after stopping it he still has had episodes of wake up.  Has a history of back surgery and issues with pain.  Has had negative ROSALIE evaluation but does snore.  History of ETOH use and dry for 11 years, no caffiene.  He was doing well on the mirabegron for a couple years and then around [place] time something changed.  denies any changes in his health since last visit.    Of note patient has elevated PSA last month of 6.3, now almost 7.  Had PIN in 2011 which is the last time he was biopsied.  His father had prostate cancer.  Patient is on anticoagulation.  He is also been asked to do a colonoscopy.    /78   Pulse 68   Ht 1.873 m (6' 1.75\")   Wt 141.5 kg (312 lb)   SpO2 94%   BMI 40.33 kg/m    He is comfortable, in no distress, non-labored breathing.        A/P: 64 year old M with OAB but also elevated PSA, family history of prostate cancer    At this time he needs to have colonoscopy and prostate biopsy to ensure there are not malignancy contributing to his OAB    He briefly met with Dr Andersen today to discuss MRI fusion biopsy (orders placed)    He will return to discuss his bladder symptoms after the further assessment    15 minutes were spent with the patient today, > 50% in counseling and coordination of care    Naina Mock MD MPH   of Urology    CC  Patient Care Team:  Obdulio Ingram MD as PCP - General (Internal Medicine)  Obdulio Ingram MD as Assigned PCP                "

## 2020-01-23 NOTE — TELEPHONE ENCOUNTER
Spoke to Esau Van, [facility], [facility].    Patient should be bridged with lovenox. 1mg/kg subcutaneous every 12 hours.  Order is pended.  Last time he was bridged was May 2019.  CrCl is good.    He will need to hold warfarin for 5 days before colonoscopy and do lovenox bridge.  The last time he was on a hold, his INR was not at goal after 9 days of restarting warfarin at his usual dose.  We would have to bump up his warfarin dose to get it to goal faster and then hold it again for the biopsy.   It would be less risky for the patient if he just stayed off warfarin and on lovenox until after the biopsy instead of stopping and starting it again a few days later.  This is the recommendation if he is agreeable to that.    Please call patient. Ok to leave message [phone].  Instructions can also be copied and pasted into [employer].    Instructions:    Last warfarin dose: 1/23/2020 1/24/2020, NO warfarin    1/25/2020, NO warfarin, begin enoxaparin injections into the abdomen every 12 hours starting this evening.    1/26/2020, NO warfarin, enoxaparin injections into the abdomen every 12 hours.    1/27/2020, NO warfarin, enoxaparin injections into the abdomen every 12 hours.    1/28/2020, NO warfarin, enoxaparin injection into the abdomen in the morning (no enoxaparin 24 hours prior to surgery)    1/29/2020, DAY OF PROCEDURE, NO enoxaparin, NO warfarin.     1/30/2020, NO warfarin, enoxaparin injection into the abdomen in the evening, unless instructed otherwise by the physician    1/31/2020 through 2/15/2020, NO warfarin, enoxaparin injections into the abdomen every 12 hours.    2/16/2020, NO warfarin, enoxaparin injection into the abdomen in the morning (no enoxaparin 24 hours prior to surgery)    2/17/2020, DAY OF PROCEDURE, NO enoxaparin, NO warfarin.    2/18/2020, 7.5mg warfarin, enoxaparin injection into the abdomen in the evening, unless instructed otherwise by the physician    2/19/2020, 7.5mg  warfarin, enoxaparin injection into the abdomen every 12 hours.    2/20/2020, 7.5mg warfarin, enoxaparin injection into the abdomen every 12 hours.    2/21/2020, 7.5mg warfarin, enoxaparin injection into the abdomen every 12 hours.    2/22/2020, 7.5mg warfarin, enoxaparin injection into the abdomen every 12 hours.    2/23/2020, 7.5mg warfarin, enoxaparin injection into the abdomen every 12 hours.    2/24/2020, enoxaparin injection into the abdomen every 12 hours. Make an appointment to have INR checked.        Date 2020 Day warfarin lovenox INR check   1/23 Thurs 7.5 mg     1/24 Fri 1/25 Sat  PM only    1/26 Sun  every 12 hours    1/27 Mon  every 12 hours    1/28 Tues  AM only    1/29 Wed 1/30 Thurs  PM only    1/31 Fri  every 12 hours    2/1 Sat  every 12 hours    2/2 Sun  every 12 hours    2/3 Mon  every 12 hours    2/4 Tues  every 12 hours    2/5 Wed  every 12 hours    2/6 Thurs  every 12 hours    2/7 Fri  every 12 hours    2/8 Sat  every 12 hours    2/9 Sun  every 12 hours    2/10 Mon  every 12 hours    2/11 Tues  every 12 hours    2/12 Wed  every 12 hours    2/13 Thurs  every 12 hours    2/14 Fri  every 12 hours    2/15 Sat  every 12 hours    2/16 Sun  AM only    2/17 Mon 2/18 Tues 7.5 mg PM only    2/19 Wed 7.5 mg every 12 hours    2/20 Thurs 7.5 mg every 12 hours    2/21 Fri 7.5 mg every 12 hours    2/22 Sat 7.5 mg every 12 hours    2/23 Sun 7.5 mg every 12 hours    2/24 Mon to be determined every 12 hours INR appt       Nova John, [facility]

## 2020-01-23 NOTE — TELEPHONE ENCOUNTER
CHADS2 Score Risk Assessment    CHF     Yes = 1    No = 0 No   Hypertension   Yes = 1    No = 0  No   Age >75 yrs   Yes = 1    No = 0 No   Diabetes   Yes = 1    No = 0 No   Stroke/TIA   Yes = 2    No = 0 No     CHADS2 Score: 0    Estimated Creatinine Clearance: 108.6 mL/min (based on SCr of 1.04 mg/dL).    Aortic valve replacement 1992  ([employer] F's valve)    Weight on 1/22/20: 141.5 kg

## 2020-01-24 NOTE — TELEPHONE ENCOUNTER
Spoke with patient. He received the directions via [employer] and states understanding.  He has lovenox at home from the past but recommended he ensure that it's not .  Patient agrees with plan.  He will damaris with any further questions or concerns.  Clare Samuels, RN  Anticoagulation Nurse - [facility], [place]

## 2020-01-24 NOTE — TELEPHONE ENCOUNTER
Left a message for patient to give us a call back.  Also sent patient a [employer] message with detailed information. Will try patient back again early afternoon if no return call.  Clare Samuels, RN  Anticoagulation Nurse - [facility], [place]

## 2020-01-30 ENCOUNTER — TELEPHONE (OUTPATIENT)
Dept: INTERNAL MEDICINE | Facility: CLINIC | Age: 65
End: 2020-01-30

## 2020-01-30 DIAGNOSIS — Z79.01 LONG TERM CURRENT USE OF ANTICOAGULANT THERAPY: Primary | ICD-10-CM

## 2020-01-30 DIAGNOSIS — Z79.01 LONG TERM CURRENT USE OF ANTICOAGULANT THERAPY: ICD-10-CM

## 2020-01-30 DIAGNOSIS — I48.91 ATRIAL FIBRILLATION (H): ICD-10-CM

## 2020-01-30 DIAGNOSIS — Z95.2 AORTIC VALVE PROSTHESIS PRESENT: ICD-10-CM

## 2020-01-30 LAB
CAPILLARY BLOOD COLLECTION: NORMAL
INR PPP: 1.1 (ref 0.86–1.14)

## 2020-01-30 PROCEDURE — 36416 COLLJ CAPILLARY BLOOD SPEC: CPT | Performed by: INTERNAL MEDICINE

## 2020-01-30 PROCEDURE — 85610 PROTHROMBIN TIME: CPT | Performed by: INTERNAL MEDICINE

## 2020-01-30 NOTE — TELEPHONE ENCOUNTER
Left VM for Lissy regarding Nicko not needing INR today, and requested they call back to discuss if colonoscopy will be rescheduled before surgery, and checking heparin anti Xa level in 1-2 weeks.    Nolvia Andrew, PharmD BCACP  Anticoagulation Clinical Pharmacist

## 2020-01-30 NOTE — TELEPHONE ENCOUNTER
Spoke with Nicko, having colonoscopy 01/31/2020 at [facility] in [place].      Will need INR info faxed to them one resulted.      Also discussed checking heparin anti Xa, scheduled and gave parameters for when to dose Lovenox 02/13/2020 (7:30-8AM) for accurate level.    Nolvia Andrew, PharmD BCACP  Anticoagulation Clinical Pharmacist

## 2020-01-30 NOTE — TELEPHONE ENCOUNTER
Left VM to inform [facility] nurse ill today.  Requested call back since not taking warfarin, will do Lovenox between colonoscopy that was cancelled 01/29/2020 and upcoming surgery 02/17/2020.    Need to verify if colonoscopy rescheduled during interim before surgery to instruct for potential Lovenox hold.      Due to longer use of Lovenox recommend doing anti Xa for LMWH 4-6 hours after injection in 1-2 weeks.  Order placed.      Nolvia Andrew, PharmD BCACP  Anticoagulation Clinical Pharmacist

## 2020-01-30 NOTE — TELEPHONE ENCOUNTER
Nicko completed INR, see [facility] Telephone encounter for plan.      Directed to call and get heparin anti Xa drawn sooner than 2/13/20 if bruising around injection areas increases (reports currently dime to quarter size at most).    Nolvia Andrew, PharmD BCACP  Anticoagulation Clinical Pharmacist

## 2020-01-31 ENCOUNTER — TRANSFERRED RECORDS (OUTPATIENT)
Dept: HEALTH INFORMATION MANAGEMENT | Facility: CLINIC | Age: 65
End: 2020-01-31

## 2020-02-06 ENCOUNTER — TELEPHONE (OUTPATIENT)
Dept: INTERNAL MEDICINE | Facility: CLINIC | Age: 65
End: 2020-02-06

## 2020-02-06 DIAGNOSIS — Z95.2 AORTIC VALVE PROSTHESIS PRESENT: ICD-10-CM

## 2020-02-06 DIAGNOSIS — Z79.01 LONG TERM CURRENT USE OF ANTICOAGULANT THERAPY: ICD-10-CM

## 2020-02-06 DIAGNOSIS — I48.91 ATRIAL FIBRILLATION (H): ICD-10-CM

## 2020-02-06 NOTE — TELEPHONE ENCOUNTER
Pt called to say he had a colonoscopy 1/31 and is off of his warfarin and using Lovenox BID until a biopsy that is scheduled for 2/19. Pt reports he was instructed to stay off of his warfarin and continue Lovenox BID until biopsy procedure. Appt was made for INR w/ [facility] nurse 2/21 in case pt resumes warfarin night of procedure and will likely remain on Lovenox until INR is therapeutic per protocol. Advised pt that he will receive specific instructions from his surgeon after his biopsy regarding his warfarin and Lovenox and when to resume. Pt verbalized understanding.    Purnima Ortez, RN on 2/6/2020 at 11:08 AM  [facility]

## 2020-02-07 ENCOUNTER — TELEPHONE (OUTPATIENT)
Dept: INTERNAL MEDICINE | Facility: CLINIC | Age: 65
End: 2020-02-07

## 2020-02-07 NOTE — TELEPHONE ENCOUNTER
Prior Authorization Retail Medication Request    Medication/Dose:enoxaparin ANTICOAGULANT   ICD code (if different than what is on RX):  unknown  Previously Tried and Failed:  unknown  Rationale:  unknown    Insurance Name:  unknown  Insurance ID:  unknown      Pharmacy Information (if different than what is on RX)  Name:  [facility]  Phone:  [phone]

## 2020-02-10 ENCOUNTER — TELEPHONE (OUTPATIENT)
Dept: INTERNAL MEDICINE | Facility: CLINIC | Age: 65
End: 2020-02-10

## 2020-02-10 DIAGNOSIS — Z95.2 AORTIC VALVE PROSTHESIS PRESENT: Primary | ICD-10-CM

## 2020-02-10 NOTE — TELEPHONE ENCOUNTER
PA Initiation    Medication: enoxaparin ANTICOAGULANT (LOVENOX) 150 MG/ML syringe - INITIATED  Insurance Company: [place] - Phone [phone] Fax [phone]  Pharmacy Filling the Rx: [id number] - [place], [place] - [address]  Filling Pharmacy Phone: [phone]  Filling Pharmacy Fax: [phone]  Start Date: 2/10/2020

## 2020-02-10 NOTE — TELEPHONE ENCOUNTER
Contacted patient, his urologist recommended taking antibiotic prior to procedure due to mechanical heart valve.

## 2020-02-10 NOTE — TELEPHONE ENCOUNTER
Patient calls to request an antibiotic prior to a biopsy on his prostate scheduled for 2/17/20. Please send Rx to [facility] pharmacy on [address] in [place].

## 2020-02-11 DIAGNOSIS — Z79.01 LONG TERM CURRENT USE OF ANTICOAGULANT THERAPY: ICD-10-CM

## 2020-02-11 DIAGNOSIS — I48.20 CHRONIC ATRIAL FIBRILLATION (H): ICD-10-CM

## 2020-02-11 DIAGNOSIS — Z95.2 AORTIC VALVE PROSTHESIS PRESENT: ICD-10-CM

## 2020-02-11 DIAGNOSIS — Z79.01 CURRENT USE OF LONG TERM ANTICOAGULATION: ICD-10-CM

## 2020-02-11 RX ORDER — WARFARIN SODIUM 5 MG/1
TABLET ORAL
Qty: 100 TABLET | Refills: 0 | Status: SHIPPED | OUTPATIENT
Start: 2020-02-11 | End: 2020-05-12

## 2020-02-11 NOTE — TELEPHONE ENCOUNTER
Warfarin dosing is managed by INR Clinic.  Prescriptions for Lovenox and warfarin approved per [facility] Refill Protocol.

## 2020-02-11 NOTE — TELEPHONE ENCOUNTER
Prior Authorization Approval    Authorization Effective Date: 2/6/2020  Authorization Expiration Date: 2/6/2021  Medication: enoxaparin ANTICOAGULANT (LOVENOX) 150 MG/ML syringe - INITIATED  Approved Dose/Quantity: 60 SYRINGES PER 30 DAYS  Insurance Company: [place] - Phone [phone] Fax [phone]  Which Pharmacy is filling the prescription (Not needed for infusion/clinic administered): [id number] [address]  Pharmacy Notified: Yes  Patient Notified: Yes    Spoke to pharmacist to relay PA approval. Pharmacist made note of NEW QTY approval for 60 syringes per 30 days, however pharmacy needs NEW RX for a 30 day supply. Sending message to provider staff for a refill.    Pharmacist says they are also waiting on a PA for patient's Warfarin, which we did NOT get a request for. Will start ASAP.    Amber ORTIZ  Central PA Team

## 2020-02-11 NOTE — TELEPHONE ENCOUNTER
"[facility] pharmacy calling to request refills of enoxaparin 60 syringes and warfarin. The PRIOR AUTHORIZATION was approved for enoxaparin 60 syringes and there should not be a PRIOR AUTHORIZATION needed for the warfarin per the pharmacy. Lin says they have been waiting for warfarin since 1/28/20.      Requested Prescriptions   Pending Prescriptions Disp Refills     warfarin ANTICOAGULANT (COUMADIN) 5 MG tablet  Last Written Prescription Date:  10/29/19  Last Fill Quantity: 100,  # refills: 0   Last office visit: 10/29/2019 with prescribing provider:  Nataly   Future Office Visit:     100 tablet 0     Sig: Take 1.5 tablets (7.5 mg) by mouth Sun & Thu; take 1 tablet (5 mg) Mon, Tue, Wed, Fri, Sat or as instructed by INR Clinic.       Vitamin K Antagonists Failed - 2/11/2020 10:45 AM        Failed - INR is within goal in the past 6 weeks     Confirm INR is within goal in the past 6 weeks.     Recent Labs   Lab Test 01/30/20  1317   INR 1.10                       Passed - Recent (12 mo) or future (30 days) visit within the authorizing provider's specialty     Patient has had an office visit with the authorizing provider or a provider within the authorizing providers department within the previous 12 mos or has a future within next 30 days. See \"Patient Info\" tab in inbasket, or \"Choose Columns\" in Meds & Orders section of the refill encounter.              Passed - Medication is active on med list        Passed - Patient is 18 years of age or older               "

## 2020-02-11 NOTE — TELEPHONE ENCOUNTER
Lovenox ordered as bridging for Colonoscopy. Per PA approval, insurance needs quantity changed to 60 syringes - order pended and routed to Dr. Ingram to review.     Warfarin forwarded to INR nurse to review.

## 2020-02-12 DIAGNOSIS — G89.29 CHRONIC BILATERAL LOW BACK PAIN WITHOUT SCIATICA: ICD-10-CM

## 2020-02-12 DIAGNOSIS — M54.50 CHRONIC BILATERAL LOW BACK PAIN WITHOUT SCIATICA: ICD-10-CM

## 2020-02-12 RX ORDER — CIPROFLOXACIN 500 MG/1
TABLET, FILM COATED ORAL
Qty: 1 TABLET | Refills: 3 | Status: ON HOLD | OUTPATIENT
Start: 2020-02-12 | End: 2020-11-28

## 2020-02-12 NOTE — TELEPHONE ENCOUNTER
Requested Prescriptions   Pending Prescriptions Disp Refills     pregabalin (LYRICA) 100 MG capsule [Pharmacy Med Name: Pregabalin Oral Capsule 100 MG] 270 capsule 0     Sig: TAKE 1 CAPSULE (100 MG) BY MOUTH 3 TIMES DAILY.  DO NOT TAKE IF SLEEPY/SEDATED.       There is no refill protocol information for this order      Last Written Prescription Date:  10/29/2019  Last Fill Quantity: 270,  # refills: 0   Last office visit: 10/29/2019 with prescribing provider:     Future Office Visit:

## 2020-02-13 DIAGNOSIS — I48.91 ATRIAL FIBRILLATION (H): ICD-10-CM

## 2020-02-13 DIAGNOSIS — Z95.2 AORTIC VALVE PROSTHESIS PRESENT: ICD-10-CM

## 2020-02-13 DIAGNOSIS — Z79.01 LONG TERM CURRENT USE OF ANTICOAGULANT THERAPY: ICD-10-CM

## 2020-02-13 LAB — LMWH PPP CHRO-ACNC: 0.49 IU/ML

## 2020-02-13 PROCEDURE — 36415 COLL VENOUS BLD VENIPUNCTURE: CPT | Performed by: INTERNAL MEDICINE

## 2020-02-13 PROCEDURE — 85520 HEPARIN ASSAY: CPT | Performed by: INTERNAL MEDICINE

## 2020-02-13 RX ORDER — PREGABALIN 100 MG/1
100 CAPSULE ORAL 3 TIMES DAILY
Qty: 270 CAPSULE | Refills: 0 | Status: SHIPPED | OUTPATIENT
Start: 2020-02-13 | End: 2020-07-26

## 2020-02-14 ENCOUNTER — TELEPHONE (OUTPATIENT)
Dept: INTERNAL MEDICINE | Facility: CLINIC | Age: 65
End: 2020-02-14

## 2020-02-14 DIAGNOSIS — Z95.2 AORTIC VALVE PROSTHESIS PRESENT: ICD-10-CM

## 2020-02-14 DIAGNOSIS — Z79.01 LONG TERM CURRENT USE OF ANTICOAGULANT THERAPY: ICD-10-CM

## 2020-02-14 DIAGNOSIS — I48.91 ATRIAL FIBRILLATION (H): ICD-10-CM

## 2020-02-14 NOTE — TELEPHONE ENCOUNTER
ANTICOAGULATION MANAGEMENT     Patient Name:  Todd S Aschoff  Date:  2/14/2020    ASSESSMENT /SUBJECTIVE:    Level was drawn ~8 hours after dose    Today's antiXa result of 0.49 is subtherapeutic. Goal antiXa for BID Lovenox is 0.6-1.0      Lovenox dose taken: Lovenox taken as previously instructed    Diet: No new diet changes affecting Lovenox    Medication changes/ interactions: No new medications/supplements affecting INR    S/S of bleeding or thromboembolism: Yes: bruising with Lovenox injection, one area is about 2 inches accross    New injury or illness:  No    Upcoming surgery, procedure or cardioversion: urology procedure 02/17/2020    Additional findings: N/A      PLAN:    Spoke with Nicko regarding Lovenox result and instructed:     Warfarin Dosing Instructions: Continue Lovenox until 02/16/2020, last does AM    Instructed patient to follow up no later than: 2 weeks  ACC RN visit scheduled    Education provided: Yes: rotating sites for injections, not injecting into bruised areas       Nicko verbalizes understanding and agrees to warfarin dosing plan.    Instructed to call the Anticoagulation Clinic for any changes, questions or concerns. (#[phone])      Nolvia Andrew, PharmD BCACP  Anticoagulation Clinical Pharmacist      OBJECTIVE:  INR   Date Value Ref Range Status   01/30/2020 1.10 0.86 - 1.14 Final     Comment:     This test is intended for monitoring Coumadin therapy.  Results are not   accurate in patients with prolonged INR due to factor deficiency.

## 2020-02-17 ENCOUNTER — ALLIED HEALTH/NURSE VISIT (OUTPATIENT)
Dept: UROLOGY | Facility: CLINIC | Age: 65
End: 2020-02-17
Payer: COMMERCIAL

## 2020-02-17 ENCOUNTER — OFFICE VISIT (OUTPATIENT)
Dept: UROLOGY | Facility: CLINIC | Age: 65
End: 2020-02-17
Payer: COMMERCIAL

## 2020-02-17 VITALS
OXYGEN SATURATION: 95 % | SYSTOLIC BLOOD PRESSURE: 130 MMHG | DIASTOLIC BLOOD PRESSURE: 80 MMHG | HEART RATE: 81 BPM | WEIGHT: 312 LBS | HEIGHT: 74 IN | BODY MASS INDEX: 40.04 KG/M2

## 2020-02-17 DIAGNOSIS — R97.20 ELEVATED PROSTATE SPECIFIC ANTIGEN (PSA): Primary | ICD-10-CM

## 2020-02-17 PROCEDURE — 55700 ZZHC BIOPSY PROSTATE NEEDLE/PUNCH: CPT | Performed by: UROLOGY

## 2020-02-17 PROCEDURE — 96372 THER/PROPH/DIAG INJ SC/IM: CPT | Mod: 59 | Performed by: UROLOGY

## 2020-02-17 PROCEDURE — 76872 US TRANSRECTAL: CPT | Performed by: UROLOGY

## 2020-02-17 PROCEDURE — 88305 TISSUE EXAM BY PATHOLOGIST: CPT | Performed by: UROLOGY

## 2020-02-17 PROCEDURE — 99207 ZZC NO CHARGE NURSE ONLY: CPT

## 2020-02-17 RX ORDER — GENTAMICIN 40 MG/ML
80 INJECTION, SOLUTION INTRAMUSCULAR; INTRAVENOUS ONCE
Status: COMPLETED | OUTPATIENT
Start: 2020-02-17 | End: 2020-02-17

## 2020-02-17 RX ORDER — LIDOCAINE HYDROCHLORIDE 10 MG/ML
20 INJECTION, SOLUTION INFILTRATION; PERINEURAL ONCE
Status: COMPLETED | OUTPATIENT
Start: 2020-02-17 | End: 2020-02-17

## 2020-02-17 RX ADMIN — GENTAMICIN 80 MG: 40 INJECTION, SOLUTION INTRAMUSCULAR; INTRAVENOUS at 09:00

## 2020-02-17 RX ADMIN — LIDOCAINE HYDROCHLORIDE 20 ML: 10 INJECTION, SOLUTION INFILTRATION; PERINEURAL at 09:45

## 2020-02-17 ASSESSMENT — PAIN SCALES - GENERAL: PAINLEVEL: SEVERE PAIN (6)

## 2020-02-17 ASSESSMENT — MIFFLIN-ST. JEOR: SCORE: 2276

## 2020-02-17 NOTE — LETTER
2/17/2020       RE: Todd S Aschoff  [address]  [place] [address]     Dear Colleague,    Thank you for referring your patient, Todd S Aschoff, to the [facility] [place] at [facility]. Please see a copy of my visit note below.    PHYSICIANS NOTE: TRANSRECTAL ULTRASOUND AND BIOPSY PROCEDURE: 2/17/2020   JENELLE    Sign In   History and Physical Exam reviewed and is unchanged.     Primary Diagnosis: Elevated psa (primary encounter diagnosis)   Prostate cancer     Informed Consent Discussed: Yes. Risks, benefits, alternatives and personnel discussed with patient who consents to proceed.     Sign in Communication: Completed   Time Out: Team Confirms the Correct Patient,   Correct Procedure; Transrectal Ultrasound and Biopsy, Correct Site and Site Marking (not applicable), Correct Position.   Affirmation of Time Out: YES   Sign Out: Sign Out Discussion: Completed   Patient history and ROS confirmed unchanged from last office visit.   Family history for prostate cancer is: unknown   Audible Time Out: Yes     TRUS PROCEDURE WITH BIOPSY     The patient was placed in the lateral decubitus position.     Digital rectal exam was normal.     The ultrasound probe was placed into the rectum and the prostate visualized.   Approximately five cc plain lidocaine was injected bilaterally into the perioprostatic nerves.     The prostate was visualized in both planes and no hypoechoic lesion identified.     The total prostate volume was 48.0 gm     The patient underwent biopsy removing 15 total cores. 3 cores from the MRI targeted lesion and 12 standard cores.     PSA   Date Value Ref Range Status   01/14/2020 6.93 (H) 0 - 4 ug/L Final     Comment:     Assay Method:  Chemiluminescence using Siemens Vista analyzer     MRI PROSTATE:  7/2/17  FINDINGS:  Prostate gland size: 35 x 51 x 50  Volume: 46.4 g     Peripheral zone: In the left posterior peripheral zone  in the mid  gland (series 7 image 31), there is a 4 x 4 by 4 mm focus of T2  hypointensity with moderately decreased ADC signal, no significant  increased signal on high B value diffusion weighted imaging, and no  early enhancement (best visualized on series 9 image 15, series 6  image 19, series 5 image 25, series 3 image 13).     PI-RADS:  Peripheral zone T2: 3  Diffusion-weighted image: 3    Contrast-enhanced images: Negative       Overall assessment: 3     Transitional zone: There are BPH type changes without suspicious  lesion.     PI-RADS:  Transition zone T2: 2  Diffusion-weighted image: 2  Contrast-enhanced images: Negative       Overall assessment: 2     Remainder of the pelvis:  No lymphadenopathy. The visualized  vasculature is patent. Partially decompressed bladder, otherwise  unremarkable. Partially evaluated bilateral hip prosthesis,  surrounding metal artifact limits evaluation. No suspicious focal  lesions in the bones or the soft tissue. The visualized bowel is  unremarkable. Small bilateral fat-containing inguinal hernias.  Hydrocele.                                                                      IMPRESSION:   1. Based on the most suspicious abnormality, this exam is  characterized as PIRADS 3 - Intermediate probability.  The presence of  clinically significant cancer is equivocal. The most suspicious focus  is in the left posterior lateral peripheral zone at the 5:00 position  measuring 4 mm. There is no evidence for extraprostatic extension.  2. No evidence of extraprostatic malignancy. No suspicious adenopathy  or evidence of pelvic metastases.  ----------     Complications: None     Follow-up: We will CONTACT the patient. He is on MyChart.    Jamal Andersen MD

## 2020-02-17 NOTE — PATIENT INSTRUCTIONS
[facility] Urology  Transrectal Ultrasound  Post Operative Information    The physician who performed your Transrectal Ultrasound is Dr. Andersen (telephone number [phone]).  Please contact this doctor if you have any problems or questions.  If unable to reach your doctor, please return to the Emergency Department.      Take one antibiotic the evening of the procedure and then as directed on your prescription.    Drink at least 6-8 glasses of fluids for the first 48 hours.    Avoid heavy lifting and strenuous activity for 48 hours.    Avoid sexual intercourse for the first 24 hours.    No aspirin or ibuprofen products (Motrin, Advil, Nuprin, ect.) for one week.  You may take acetaminophen (Tylenol) for pain.    You may notice a small amount of blood on the tissue after a bowel movement.    You may pass blood with clots in your urine following the procedure.  The amount will decrease with time but may be visible for up to two weeks.     You make have blood in your semen for 4 weeks after the procedure.    You may experience mild perineal (groin area) discomfort after the procedure.    Please call you doctor if you have any of the follow symptoms:  Fever  Increase in the amount of blood passed  Severe discomfort or pain

## 2020-02-17 NOTE — NURSING NOTE
Chief Complaint   Patient presents with     Elevated PSA     patient is here for transrectal ultrasound of the prostate with biopsies.      Procedure was explained to patient prior to performing said procedure.  The patient signed the [facility] consent form and all questions were answered prior to the procedure.  Any pre-procedural antibiotics were given according to the performing physician's recommendation.  Patient reviewed information on the labels attached to samples and confirmed the accuracy of all of the labels.     Performing Physician:  Dr. Andersen  Antibiotic taken?  yes  Aspirin or other blood thinning medications discontinued 7-10 days:  yes  Time of enema:  8:00am   Patient given Gentamicin intramuscular injection 30 minutes prior to procedure  Yes    The following medication was given:     MEDICATION:  Lidocaine 1% Soln  ROUTE: Transrectal prostate   SITE: Transrectal prostate  DOSE: 20ml  LOT #: [id number]  : Hospira  EXPIRATION DATE: 07/01/2021  NDC#: 3205-7444-50   Was there drug waste? No  Multi-dose vial: Yes  Twelve  samples were taken from the right and left, medial and lateral base, mid and apex of the prostate respectively. 3 additional biopsies were taken from left lesion. Vitals were repeated prior to patient leaving and instructions for post TRUS care were explained to the patient.     Aurelia Talbot, CMA

## 2020-02-17 NOTE — PROGRESS NOTES
PHYSICIANS NOTE: TRANSRECTAL ULTRASOUND AND BIOPSY PROCEDURE: 2/17/2020   JENELLE    Sign In   History and Physical Exam reviewed and is unchanged.     Primary Diagnosis: Elevated psa (primary encounter diagnosis)   Prostate cancer     Informed Consent Discussed: Yes. Risks, benefits, alternatives and personnel discussed with patient who consents to proceed.     Sign in Communication: Completed   Time Out: Team Confirms the Correct Patient,   Correct Procedure; Transrectal Ultrasound and Biopsy, Correct Site and Site Marking (not applicable), Correct Position.   Affirmation of Time Out: YES   Sign Out: Sign Out Discussion: Completed   Patient history and ROS confirmed unchanged from last office visit.   Family history for prostate cancer is: unknown   Audible Time Out: Yes     TRUS PROCEDURE WITH BIOPSY     The patient was placed in the lateral decubitus position.     Digital rectal exam was normal.     The ultrasound probe was placed into the rectum and the prostate visualized.   Approximately five cc plain lidocaine was injected bilaterally into the perioprostatic nerves.     The prostate was visualized in both planes and no hypoechoic lesion identified.     The total prostate volume was 48.0 gm     The patient underwent biopsy removing 15 total cores. 3 cores from the MRI targeted lesion and 12 standard cores.     PSA   Date Value Ref Range Status   01/14/2020 6.93 (H) 0 - 4 ug/L Final     Comment:     Assay Method:  Chemiluminescence using Siemens Vista analyzer     MRI PROSTATE:  7/2/17  FINDINGS:  Prostate gland size: 35 x 51 x 50  Volume: 46.4 g     Peripheral zone: In the left posterior peripheral zone in the mid  gland (series 7 image 31), there is a 4 x 4 by 4 mm focus of T2  hypointensity with moderately decreased ADC signal, no significant  increased signal on high B value diffusion weighted imaging, and no  early enhancement (best visualized on series 9 image 15, series 6  image 19, series 5 image 25,  series 3 image 13).     PI-RADS:  Peripheral zone T2: 3  Diffusion-weighted image: 3    Contrast-enhanced images: Negative       Overall assessment: 3     Transitional zone: There are BPH type changes without suspicious  lesion.     PI-RADS:  Transition zone T2: 2  Diffusion-weighted image: 2  Contrast-enhanced images: Negative       Overall assessment: 2     Remainder of the pelvis:  No lymphadenopathy. The visualized  vasculature is patent. Partially decompressed bladder, otherwise  unremarkable. Partially evaluated bilateral hip prosthesis,  surrounding metal artifact limits evaluation. No suspicious focal  lesions in the bones or the soft tissue. The visualized bowel is  unremarkable. Small bilateral fat-containing inguinal hernias.  Hydrocele.                                                                      IMPRESSION:   1. Based on the most suspicious abnormality, this exam is  characterized as PIRADS 3 - Intermediate probability.  The presence of  clinically significant cancer is equivocal. The most suspicious focus  is in the left posterior lateral peripheral zone at the 5:00 position  measuring 4 mm. There is no evidence for extraprostatic extension.  2. No evidence of extraprostatic malignancy. No suspicious adenopathy  or evidence of pelvic metastases.  ----------     Complications: None     Follow-up: We will CONTACT the patient. He is on MyChart.    Jamal Andersen MD

## 2020-02-18 LAB — COPATH REPORT: NORMAL

## 2020-02-21 ENCOUNTER — ANTICOAGULATION THERAPY VISIT (OUTPATIENT)
Dept: NURSING | Facility: CLINIC | Age: 65
End: 2020-02-21
Payer: COMMERCIAL

## 2020-02-21 DIAGNOSIS — Z95.2 AORTIC VALVE PROSTHESIS PRESENT: ICD-10-CM

## 2020-02-21 DIAGNOSIS — Z79.01 LONG TERM CURRENT USE OF ANTICOAGULANT THERAPY: Primary | ICD-10-CM

## 2020-02-21 DIAGNOSIS — I48.91 ATRIAL FIBRILLATION (H): ICD-10-CM

## 2020-02-21 LAB — INR POINT OF CARE: 1.1 (ref 0.86–1.14)

## 2020-02-21 PROCEDURE — 36416 COLLJ CAPILLARY BLOOD SPEC: CPT

## 2020-02-21 PROCEDURE — 85610 PROTHROMBIN TIME: CPT | Mod: QW

## 2020-02-21 NOTE — PROGRESS NOTES
ANTICOAGULATION FOLLOW-UP CLINIC VISIT    Patient Name:  Todd S Aschoff  Date:  2020  Contact Type:  Face to Face    SUBJECTIVE:  Patient Findings     Positives:   Signs/symptoms of bleeding, Missed doses    Comments:   Patient was on a hold for prostate biopsy on 20.  Results benign.  He continues to have some blood in his urine.    INR is subtherapeutic today at 1.1.  He says that his INR is slow to come back to goal after a hold.   Patient will take 10 mg daily for 3 days.   Continue lovenox.  Follow up in 3 days.           Clinical Outcomes     Comments:   Patient was on a hold for prostate biopsy on 20.  Results benign.  He continues to have some blood in his urine.    INR is subtherapeutic today at 1.1.  He says that his INR is slow to come back to goal after a hold.   Patient will take 10 mg daily for 3 days.   Continue lovenox.  Follow up in 3 days.              OBJECTIVE    INR Protime   Date Value Ref Range Status   2020 1.1 0.86 - 1.14 Final       ASSESSMENT / PLAN  INR assessment SUB    Recheck INR In: 3 DAYS    INR Location Clinic      Anticoagulation Summary  As of 2020    INR goal:   2.5-3.5   TTR:   80.6 % (1 y)   INR used for dosin.1! (2020)   Warfarin maintenance plan:   7.5 mg (5 mg x 1.5) every Sun, Thu; 5 mg (5 mg x 1) all other days   Full warfarin instructions:   : 10 mg; : 10 mg; : 10 mg; Otherwise 7.5 mg every Sun, Thu; 5 mg all other days   Weekly warfarin total:   40 mg   Plan last modified:   Nova John RN (2018)   Next INR check:   2020   Priority:   High   Target end date:   Indefinite    Indications    Aortic valve prosthesis present [Z95.2]  Atrial fibrillation (H) [I48.91]  Long term current use of anticoagulant therapy [Z79.01]             Anticoagulation Episode Summary     INR check location:       Preferred lab:       Send INR reminders to:   AIDA GUERRERO    Comments:   5mg tabs / CALENDAR       Anticoagulation  Care Providers     Provider Role Specialty Phone number    Obdulio Ingram MD Responsible Internal Medicine [phone]            See the Encounter Report to view Anticoagulation Flowsheet and Dosing Calendar (Go to Encounters tab in chart review, and find the Anticoagulation Therapy Visit)    INR is subtherapeutic today.   Patient will take 10 mg daily for 3 days.   Follow up in 3 days.     Dosage adjustment made based on physician directed care plan.      Nova John RN

## 2020-02-24 ENCOUNTER — ANTICOAGULATION THERAPY VISIT (OUTPATIENT)
Dept: NURSING | Facility: CLINIC | Age: 65
End: 2020-02-24
Payer: COMMERCIAL

## 2020-02-24 DIAGNOSIS — Z79.01 LONG TERM CURRENT USE OF ANTICOAGULANT THERAPY: ICD-10-CM

## 2020-02-24 DIAGNOSIS — I48.91 ATRIAL FIBRILLATION (H): ICD-10-CM

## 2020-02-24 DIAGNOSIS — Z95.2 AORTIC VALVE PROSTHESIS PRESENT: ICD-10-CM

## 2020-02-24 LAB — INR POINT OF CARE: 2.2 (ref 0.86–1.14)

## 2020-02-24 PROCEDURE — 36416 COLLJ CAPILLARY BLOOD SPEC: CPT

## 2020-02-24 PROCEDURE — 85610 PROTHROMBIN TIME: CPT | Mod: QW

## 2020-02-24 NOTE — PROGRESS NOTES
ANTICOAGULATION FOLLOW-UP CLINIC VISIT    Patient Name:  Todd S Aschoff  Date:  2020  Contact Type:  Face to Face    SUBJECTIVE:  Patient Findings     Positives:   Signs/symptoms of bleeding (blood in urine post procedure, stopped Saturday), Change in health (received news that no cancer present), Missed doses (intentional hold for procedure), Extra doses (restart post procedure), Bruising (at Lovenox injection sites, fading ~3x6 inches )    Comments:   Assessed for S/S bleeding, clotting, medication, diet, health, activity and alcohol changes.          Clinical Outcomes     Negatives:   Major bleeding event, Thromboembolic event, Anticoagulation-related hospital admission, Anticoagulation-related ED visit, Anticoagulation-related fatality    Comments:   Assessed for S/S bleeding, clotting, medication, diet, health, activity and alcohol changes.             OBJECTIVE    INR Protime   Date Value Ref Range Status   2020 2.2 (A) 0.86 - 1.14 Final       ASSESSMENT / PLAN  INR assessment SUB    Recheck INR In: 2 DAYS    INR Location Clinic      Anticoagulation Summary  As of 2020    INR goal:   2.5-3.5   TTR:   80.7 % (1 y)   INR used for dosin.2! (2020)   Warfarin maintenance plan:   7.5 mg (5 mg x 1.5) every Sun, Thu; 5 mg (5 mg x 1) all other days   Full warfarin instructions:   7.5 mg every Sun, Thu; 5 mg all other days   Weekly warfarin total:   40 mg   No change documented:   Nolvia Andrew [facility]   Plan last modified:   Nova John RN (2018)   Next INR check:   2020   Priority:   High   Target end date:   Indefinite    Indications    Aortic valve prosthesis present [Z95.2]  Atrial fibrillation (H) [I48.91]  Long term current use of anticoagulant therapy [Z79.01]             Anticoagulation Episode Summary     INR check location:       Preferred lab:       Send INR reminders to:   AIDA GUERRERO    Comments:   5mg tabs / CALENDAR       Anticoagulation Care Providers      Provider Role Specialty Phone number    Obdulio Ingram MD Responsible Internal Medicine [phone]            See the Encounter Report to view Anticoagulation Flowsheet and Dosing Calendar (Go to Encounters tab in chart review, and find the Anticoagulation Therapy Visit)    Will resume normal scheduled warfarin doses due to rapid rise post procedure with last boost and still on Lovenox.  Will recheck Wednesday at lab.    Nolvia Andrew [facility]

## 2020-02-26 ENCOUNTER — TELEPHONE (OUTPATIENT)
Dept: INTERNAL MEDICINE | Facility: CLINIC | Age: 65
End: 2020-02-26

## 2020-02-26 DIAGNOSIS — I48.91 ATRIAL FIBRILLATION (H): ICD-10-CM

## 2020-02-26 DIAGNOSIS — Z95.2 AORTIC VALVE PROSTHESIS PRESENT: ICD-10-CM

## 2020-02-26 DIAGNOSIS — Z79.01 LONG TERM CURRENT USE OF ANTICOAGULANT THERAPY: ICD-10-CM

## 2020-02-26 LAB — INR PPP: 2.5 (ref 0.86–1.14)

## 2020-02-26 PROCEDURE — 36415 COLL VENOUS BLD VENIPUNCTURE: CPT | Performed by: INTERNAL MEDICINE

## 2020-02-26 PROCEDURE — 85610 PROTHROMBIN TIME: CPT | Performed by: INTERNAL MEDICINE

## 2020-02-26 NOTE — TELEPHONE ENCOUNTER
ANTICOAGULATION MANAGEMENT     Patient Name:  Todd S Aschoff  Date:  2020    ASSESSMENT /SUBJECTIVE:      Today's INR result of 2.5 is therapeutic. Goal INR of 2.5-3.5      Warfarin dose taken: Warfarin taken as previously instructed    Diet: No new diet changes affecting INR    Medication changes/ interactions: No new medications/supplements affecting INR    Previous INR: Subtherapeutic     S/S of bleeding or thromboembolism: No    New injury or illness:  No    Upcoming surgery, procedure or cardioversion:  No    Additional findings: N/A      PLAN:    Left detailed message for Nicko regarding INR result and instructed:     Warfarin Dosing Instructions: Continue your current warfarin dose    Instructed patient to follow up no later than: 5 days  Left detailed message with recommended recheck date Monday, offered to assist in scheduling face to face or lab    Education provided: Yes: OK to stop Lovenox due to rapid rise seen Monday with continued increase in INR since returning to maintenance dose with 10mg doses continuing to peak from last weekend    Instructed to call the Anticoagulation Clinic for any changes, questions or concerns. (#[phone])      Nolvia Andrew, PharmD [facility]  Anticoagulation Clinical Pharmacist      OBJECTIVE:  INR   Date Value Ref Range Status   2020 2.50 (H) 0.86 - 1.14 Final     Comment:     This test is intended for monitoring Coumadin therapy.  Results are not   accurate in patients with prolonged INR due to factor deficiency.               Anticoagulation Summary  As of 2020    INR goal:   2.5-3.5   TTR:   80.6 % (1 y)   INR used for dosin.50 (2020)   Warfarin maintenance plan:   7.5 mg (5 mg x 1.5) every Sun, Thu; 5 mg (5 mg x 1) all other days   Full warfarin instructions:   7.5 mg every Sun, Thu; 5 mg all other days   Weekly warfarin total:   40 mg   Plan last modified:   Nova John, RN (2018)   Next INR check:      Priority:   High    Target end date:   Indefinite    Indications    Aortic valve prosthesis present [Z95.2]  Atrial fibrillation (H) [I48.91]  Long term current use of anticoagulant therapy [Z79.01]             Anticoagulation Episode Summary     INR check location:       Preferred lab:       Send INR reminders to:   AIDA GUERRERO    Comments:   5mg tabs / CALENDAR       Anticoagulation Care Providers     Provider Role Specialty Phone number    Obdulio Ingram MD [facility] Internal Medicine [phone]

## 2020-02-27 NOTE — TELEPHONE ENCOUNTER
Patient called back and had some questions. Please call patient at [phone].    Pam Granados,

## 2020-02-28 DIAGNOSIS — F33.42 MAJOR DEPRESSIVE DISORDER, RECURRENT EPISODE, IN FULL REMISSION (H): ICD-10-CM

## 2020-02-28 NOTE — TELEPHONE ENCOUNTER
Spoke with Nicko, still using Lovenox, bruising with each injection now.  Instructed OK to stop since 3 day of 10mg warfarin load from last week peaked/wearing off.  Appt schedule for 03/02/2020 with [facility] nurse.    Van MorelD BCACP  Anticoagulation Clinical Pharmacist

## 2020-02-28 NOTE — TELEPHONE ENCOUNTER
"Requested Prescriptions   Pending Prescriptions Disp Refills     venlafaxine (EFFEXOR-XR) 150 MG 24 hr capsule [Pharmacy Med Name:  Last Written Prescription Date:  12/2/19  Last Fill Quantity: 90,  # refills: 0   Last Office Visit: 10/29/2019   Future Office Visit:      Venlafaxine HCl ER Oral Capsule Extended Release 24 Hour 150 MG] 90 capsule 0     Sig: TAKE ONE CAPSULE BY MOUTH DAILY WITH BREAKFAST       Serotonin-Norepinephrine Reuptake Inhibitors  Passed - 2/28/2020 11:54 AM        Passed - Blood pressure under 140/90 in past 12 months     BP Readings from Last 3 Encounters:   02/17/20 130/80   01/22/20 130/78   12/31/19 130/84           Passed - PHQ-9 score of less than 5 in past 6 months     Please review last PHQ-9 score.           Passed - Medication is active on med list        Passed - Patient is age 18 or older        Passed - Normal serum creatinine on file in past 12 months     Recent Labs   Lab Test 10/29/19  1024   CR 1.04             Passed - Recent (6 mo) or future (30 days) visit within the authorizing provider's specialty     Patient had office visit in the last 6 months or has a visit in the next 30 days with authorizing provider or within the authorizing provider's specialty.  See \"Patient Info\" tab in inbasket, or \"Choose Columns\" in Meds & Orders section of the refill encounter.              "

## 2020-02-29 NOTE — TELEPHONE ENCOUNTER
Will route to primary care provider for review. Per 11/29/19 refill encounter, patient could not get into psychiatrist. Primary care provider, do you want to continue to fill?

## 2020-03-02 ENCOUNTER — ANTICOAGULATION THERAPY VISIT (OUTPATIENT)
Dept: NURSING | Facility: CLINIC | Age: 65
End: 2020-03-02
Payer: COMMERCIAL

## 2020-03-02 DIAGNOSIS — Z79.01 LONG TERM CURRENT USE OF ANTICOAGULANT THERAPY: Primary | ICD-10-CM

## 2020-03-02 DIAGNOSIS — I48.91 ATRIAL FIBRILLATION (H): ICD-10-CM

## 2020-03-02 DIAGNOSIS — Z95.2 AORTIC VALVE PROSTHESIS PRESENT: ICD-10-CM

## 2020-03-02 LAB — INR POINT OF CARE: 2.4 (ref 0.86–1.14)

## 2020-03-02 PROCEDURE — 36416 COLLJ CAPILLARY BLOOD SPEC: CPT

## 2020-03-02 PROCEDURE — 85610 PROTHROMBIN TIME: CPT | Mod: QW

## 2020-03-02 NOTE — PROGRESS NOTES
ANTICOAGULATION FOLLOW-UP CLINIC VISIT    Patient Name:  Todd S Aschoff  Date:  3/2/2020  Contact Type:  Face to Face    SUBJECTIVE:  Patient Findings     Comments:   Patient denies: any missed doses,   increased intake of green vegetables,   medication changes, bleeding/bruising,   or signs/symptoms of a clot.    INR is subtherapeutic today at 2.4.   Patient will take 7.5 mg which   will increase weekly total by 6.3%,   then resume maintenance dose.   Follow up in 2 weeks.         Clinical Outcomes     Comments:   Patient denies: any missed doses,   increased intake of green vegetables,   medication changes, bleeding/bruising,   or signs/symptoms of a clot.    INR is subtherapeutic today at 2.4.   Patient will take 7.5 mg which   will increase weekly total by 6.3%,   then resume maintenance dose.   Follow up in 2 weeks.            OBJECTIVE    INR Protime   Date Value Ref Range Status   2020 2.4 (A) 0.86 - 1.14 Final       ASSESSMENT / PLAN  INR assessment SUB    Recheck INR In: 2 WEEKS    INR Location Clinic      Anticoagulation Summary  As of 3/2/2020    INR goal:   2.5-3.5   TTR:   80.6 % (1 y)   INR used for dosin.4! (3/2/2020)   Warfarin maintenance plan:   7.5 mg (5 mg x 1.5) every Sun, Thu; 5 mg (5 mg x 1) all other days   Full warfarin instructions:   3/2: 7.5 mg; Otherwise 7.5 mg every Sun, Thu; 5 mg all other days   Weekly warfarin total:   40 mg   Plan last modified:   Nova John RN (2018)   Next INR check:   3/17/2020   Priority:   High   Target end date:   Indefinite    Indications    Aortic valve prosthesis present [Z95.2]  Atrial fibrillation (H) [I48.91]  Long term current use of anticoagulant therapy [Z79.01]             Anticoagulation Episode Summary     INR check location:       Preferred lab:       Send INR reminders to:   AIDA GUERRERO    Comments:   5mg tabs / CALENDAR       Anticoagulation Care Providers     Provider Role Specialty Phone number    Obdulio Ingram,  MD [facility] Internal Medicine [phone]            See the Encounter Report to view Anticoagulation Flowsheet and Dosing Calendar (Go to Encounters tab in chart review, and find the Anticoagulation Therapy Visit)    INR is subtherapeutic today at 2.4.   Patient will take 7.5 mg which will increase weekly total   by 6.3%, then resume maintenance dose.   Follow up in 2 weeks.     Dosage adjustment made based on physician directed care plan.      Nova John RN

## 2020-03-03 RX ORDER — VENLAFAXINE HYDROCHLORIDE 150 MG/1
CAPSULE, EXTENDED RELEASE ORAL
Qty: 90 CAPSULE | Refills: 0 | Status: SHIPPED | OUTPATIENT
Start: 2020-03-03 | End: 2020-05-29

## 2020-03-06 ENCOUNTER — OFFICE VISIT (OUTPATIENT)
Dept: UROLOGY | Facility: CLINIC | Age: 65
End: 2020-03-06
Payer: COMMERCIAL

## 2020-03-06 VITALS
BODY MASS INDEX: 41.75 KG/M2 | HEIGHT: 73 IN | OXYGEN SATURATION: 99 % | HEART RATE: 75 BPM | DIASTOLIC BLOOD PRESSURE: 86 MMHG | SYSTOLIC BLOOD PRESSURE: 164 MMHG | WEIGHT: 315 LBS

## 2020-03-06 DIAGNOSIS — R97.20 ELEVATED PROSTATE SPECIFIC ANTIGEN (PSA): Primary | ICD-10-CM

## 2020-03-06 DIAGNOSIS — N32.81 OAB (OVERACTIVE BLADDER): ICD-10-CM

## 2020-03-06 PROCEDURE — 99213 OFFICE O/P EST LOW 20 MIN: CPT | Performed by: UROLOGY

## 2020-03-06 ASSESSMENT — MIFFLIN-ST. JEOR: SCORE: 2295.85

## 2020-03-06 ASSESSMENT — PAIN SCALES - GENERAL: PAINLEVEL: MODERATE PAIN (5)

## 2020-03-06 NOTE — LETTER
"3/6/2020       RE: Todd S Aschoff  [address]  [place] [address]     Dear Colleague,    Thank you for referring your patient, Todd S Aschoff, to the [facility] UROLOGY [facility] [place] at [facility]. Please see a copy of my visit note below.    JENELLE  CHIEF COMPLAINT   It was my pleasure to see Todd S Aschoff who is a 64 year old male for follow-up of Elevated PSA.      HPI   Todd S Aschoff is a very pleasant 64 year old male who presents with a history of Elevated PSA.  Patient had previously followed with Dr. Gonzales and was referred to Dr. Mock for discussion of possible InterStim or Botox for his overactive bladder symptoms.  However he was noted to have an elevated PSA and I discussed with him proceeding with a prostate biopsy.  He underwent a MR fusion biopsy on February 17 which was based on his prior MRI from 2017.  He returns today to discuss the results of his biopsy.  There is no evidence of prostate cancer.    PHYSICAL EXAM  Patient is a 64 year old  male   Vitals: Blood pressure (!) 164/86, pulse 75, height 1.854 m (6' 1\"), weight 144.7 kg (319 lb), SpO2 99 %.  General Appearance Adult: Body mass index is 42.09 kg/m .  Alert, no acute distress, oriented  HENT: throat/mouth:normal, good dentition  Lungs: no respiratory distress, or pursed lip breathing  Heart: No obvious jugular venous distension present  Abdomen: soft, nontender, no organomegaly or masses  Musculoskeltal: extremities normal, no peripheral edema  Skin: no suspicious lesions or rashes  Neuro: Alert, oriented, speech and mentation normal  Psych: affect and mood normal  Gait: Normal    Component PSA PSA Diag Urologic Phys   Latest Ref Rng & Units 0 - 4 ug/L 0.00 - 4.00 ng/mL   4/20/2004 0.56    5/12/2006 1.11    9/5/2007 1.82    10/29/2008 2.46    10/23/2009 1.44    12/27/2010 4.68 (H)    1/6/2012 4.13 (H)    10/31/2012 4.08 (H)    12/17/2013 3.70    12/24/2014 3.37  "   1/8/2016 4.57 (H)    1/25/2017  13.80 (H)   2/22/2017 5.84 (H)    11/1/2017  3.80   6/12/2018 3.84    10/29/2019 6.28 (H)    1/14/2020 6.93 (H)      PATHOLOGY:  A.-M. - no evidence of disease     IMAGING:  All pertinent imaging reviewed:    All imaging studies reviewed by me.  I personally reviewed these imaging films.  A formal report from radiology will follow.  FINDINGS:  Prostate gland size: 35 x 51 x 50  Volume: 46.4 g     Peripheral zone: In the left posterior peripheral zone in the mid  gland (series 7 image 31), there is a 4 x 4 by 4 mm focus of T2  hypointensity with moderately decreased ADC signal, no significant  increased signal on high B value diffusion weighted imaging, and no  early enhancement (best visualized on series 9 image 15, series 6  image 19, series 5 image 25, series 3 image 13).     PI-RADS:  Peripheral zone T2: 3  Diffusion-weighted image: 3    Contrast-enhanced images: Negative       Overall assessment: 3     Transitional zone: There are BPH type changes without suspicious  lesion.     PI-RADS:  Transition zone T2: 2  Diffusion-weighted image: 2  Contrast-enhanced images: Negative       Overall assessment: 2     Remainder of the pelvis:  No lymphadenopathy. The visualized  vasculature is patent. Partially decompressed bladder, otherwise  unremarkable. Partially evaluated bilateral hip prosthesis,  surrounding metal artifact limits evaluation. No suspicious focal  lesions in the bones or the soft tissue. The visualized bowel is  unremarkable. Small bilateral fat-containing inguinal hernias.  Hydrocele.                                                                      IMPRESSION:   1. Based on the most suspicious abnormality, this exam is  characterized as PIRADS 3 - Intermediate probability.  The presence of  clinically significant cancer is equivocal. The most suspicious focus  is in the left posterior lateral peripheral zone at the 5:00 position  measuring 4 mm. There is no  evidence for extraprostatic extension.  2. No evidence of extraprostatic malignancy. No suspicious adenopathy  or evidence of pelvic metastases.    ASSESSMENT and PLAN  64-year-old man with history of elevated PSA and overactive bladder symptoms    Elevated PSA  -He is now status post an MR fusion biopsy with no evidence of prostate cancer  -We discussed that I recommend continued monitoring of his PSA with next check in about 6 months  -Should his PSA continued to rise I would then recommend a updated prostate MRI    Overactive bladder symptoms  -Recommend that he follows up is with Dr. Galvin as originally planned for further treatment discussions      I spent over 15 minutes with the patient.  Over half this time was spent on counseling regarding the above.    Jamal Andersen MD   Urology  [facility]  Clinic Phone [phone]

## 2020-03-06 NOTE — PROGRESS NOTES
"[facility]  CHIEF COMPLAINT   It was my pleasure to see Todd S Aschoff who is a 64 year old male for follow-up of Elevated PSA.      HPI   Todd S Aschoff is a very pleasant 64 year old male who presents with a history of Elevated PSA.  Patient had previously followed with Dr. Gonzales and was referred to Dr. Mock for discussion of possible InterStim or Botox for his overactive bladder symptoms.  However he was noted to have an elevated PSA and I discussed with him proceeding with a prostate biopsy.  He underwent a MR fusion biopsy on February 17 which was based on his prior MRI from 2017.  He returns today to discuss the results of his biopsy.  There is no evidence of prostate cancer.    PHYSICAL EXAM  Patient is a 64 year old  male   Vitals: Blood pressure (!) 164/86, pulse 75, height 1.854 m (6' 1\"), weight 144.7 kg (319 lb), SpO2 99 %.  General Appearance Adult: Body mass index is 42.09 kg/m .  Alert, no acute distress, oriented  HENT: throat/mouth:normal, good dentition  Lungs: no respiratory distress, or pursed lip breathing  Heart: No obvious jugular venous distension present  Abdomen: soft, nontender, no organomegaly or masses  Musculoskeltal: extremities normal, no peripheral edema  Skin: no suspicious lesions or rashes  Neuro: Alert, oriented, speech and mentation normal  Psych: affect and mood normal  Gait: Normal    Component PSA PSA Diag Urologic Phys   Latest Ref Rng & Units 0 - 4 ug/L 0.00 - 4.00 ng/mL   4/20/2004 0.56    5/12/2006 1.11    9/5/2007 1.82    10/29/2008 2.46    10/23/2009 1.44    12/27/2010 4.68 (H)    1/6/2012 4.13 (H)    10/31/2012 4.08 (H)    12/17/2013 3.70    12/24/2014 3.37    1/8/2016 4.57 (H)    1/25/2017  13.80 (H)   2/22/2017 5.84 (H)    11/1/2017  3.80   6/12/2018 3.84    10/29/2019 6.28 (H)    1/14/2020 6.93 (H)      PATHOLOGY:  A.-M. - no evidence of disease     IMAGING:  All pertinent imaging reviewed:    All imaging studies reviewed by me.  I personally reviewed these imaging " films.  A formal report from radiology will follow.  FINDINGS:  Prostate gland size: 35 x 51 x 50  Volume: 46.4 g     Peripheral zone: In the left posterior peripheral zone in the mid  gland (series 7 image 31), there is a 4 x 4 by 4 mm focus of T2  hypointensity with moderately decreased ADC signal, no significant  increased signal on high B value diffusion weighted imaging, and no  early enhancement (best visualized on series 9 image 15, series 6  image 19, series 5 image 25, series 3 image 13).     PI-RADS:  Peripheral zone T2: 3  Diffusion-weighted image: 3    Contrast-enhanced images: Negative       Overall assessment: 3     Transitional zone: There are BPH type changes without suspicious  lesion.     PI-RADS:  Transition zone T2: 2  Diffusion-weighted image: 2  Contrast-enhanced images: Negative       Overall assessment: 2     Remainder of the pelvis:  No lymphadenopathy. The visualized  vasculature is patent. Partially decompressed bladder, otherwise  unremarkable. Partially evaluated bilateral hip prosthesis,  surrounding metal artifact limits evaluation. No suspicious focal  lesions in the bones or the soft tissue. The visualized bowel is  unremarkable. Small bilateral fat-containing inguinal hernias.  Hydrocele.                                                                      IMPRESSION:   1. Based on the most suspicious abnormality, this exam is  characterized as PIRADS 3 - Intermediate probability.  The presence of  clinically significant cancer is equivocal. The most suspicious focus  is in the left posterior lateral peripheral zone at the 5:00 position  measuring 4 mm. There is no evidence for extraprostatic extension.  2. No evidence of extraprostatic malignancy. No suspicious adenopathy  or evidence of pelvic metastases.    ASSESSMENT and PLAN  64-year-old man with history of elevated PSA and overactive bladder symptoms    Elevated PSA  -He is now status post an MR fusion biopsy with no evidence  of prostate cancer  -We discussed that I recommend continued monitoring of his PSA with next check in about 6 months  -Should his PSA continued to rise I would then recommend a updated prostate MRI    Overactive bladder symptoms  -Recommend that he follows up is with Dr. Galvin as originally planned for further treatment discussions      I spent over 15 minutes with the patient.  Over half this time was spent on counseling regarding the above.    Jamal Andersen MD   Urology  [facility]  Clinic Phone [phone]

## 2020-03-06 NOTE — NURSING NOTE
Chief Complaint   Patient presents with     Clinic Care Coordination - Follow-up     Pt here to review biopsy results     Samira Roberts CMA

## 2020-03-19 DIAGNOSIS — I48.91 ATRIAL FIBRILLATION (H): Primary | ICD-10-CM

## 2020-04-16 ENCOUNTER — TELEPHONE (OUTPATIENT)
Dept: INTERNAL MEDICINE | Facility: CLINIC | Age: 65
End: 2020-04-16

## 2020-04-16 ENCOUNTER — ANTICOAGULATION THERAPY VISIT (OUTPATIENT)
Dept: NURSING | Facility: CLINIC | Age: 65
End: 2020-04-16

## 2020-04-16 DIAGNOSIS — Z79.01 LONG TERM CURRENT USE OF ANTICOAGULANT THERAPY: Primary | ICD-10-CM

## 2020-04-16 DIAGNOSIS — I48.91 ATRIAL FIBRILLATION (H): ICD-10-CM

## 2020-04-16 DIAGNOSIS — Z95.2 AORTIC VALVE PROSTHESIS PRESENT: ICD-10-CM

## 2020-04-16 LAB
CAPILLARY BLOOD COLLECTION: NORMAL
INR PPP: 3.5 (ref 0.86–1.14)

## 2020-04-16 PROCEDURE — 85610 PROTHROMBIN TIME: CPT | Performed by: INTERNAL MEDICINE

## 2020-04-16 PROCEDURE — 36416 COLLJ CAPILLARY BLOOD SPEC: CPT | Performed by: INTERNAL MEDICINE

## 2020-04-16 PROCEDURE — 99207 ZZC NO CHARGE NURSE ONLY: CPT

## 2020-04-16 NOTE — PROGRESS NOTES
ANTICOAGULATION FOLLOW-UP     Patient Name:  Todd S Aschoff  Date:  4/16/2020  Contact Type:  Telephone    SUBJECTIVE:  Patient Findings     Positives:   Change in diet/appetite    Comments:   He has been eating less spinach than he usually does.    The patient was assessed for   diet, medication,   missed or extra doses,   bruising or bleeding,   with no problem findings.          Clinical Outcomes     Comments:   He has been eating less spinach than he usually does.    The patient was assessed for   diet, medication,   missed or extra doses,   bruising or bleeding,   with no problem findings.             OBJECTIVE    INR   Date Value Ref Range Status   04/16/2020 3.50 (H) 0.86 - 1.14 Final     Comment:     This test is intended for monitoring Coumadin therapy.  Results are not   accurate in patients with prolonged INR due to factor deficiency.         ASSESSMENT / PLAN  INR assessment THER    Recheck INR In: 4 WEEKS    INR Location Clinic lab     Anticoagulation Summary  As of 4/16/2020    INR goal:   2.5-3.5   TTR:   79.5 % (1 y)   INR used for dosing:   3.50 (4/16/2020)   Warfarin maintenance plan:   7.5 mg (5 mg x 1.5) every Sun, Thu; 5 mg (5 mg x 1) all other days   Full warfarin instructions:   7.5 mg every Sun, Thu; 5 mg all other days   Weekly warfarin total:   40 mg   No change documented:   Nova John RN   Plan last modified:   Nova John RN (12/27/2018)   Next INR check:   5/14/2020   Priority:   High   Target end date:   Indefinite    Indications    Aortic valve prosthesis present [Z95.2]  Atrial fibrillation (H) [I48.91]  Long term current use of anticoagulant therapy [Z79.01]             Anticoagulation Episode Summary     INR check location:       Preferred lab:       Send INR reminders to:   AIDA GUERRERO    Comments:   5mg tabs / CALENDAR / needs bridging      Anticoagulation Care Providers     Provider Role Specialty Phone number    Obdulio Ingram MD Responsible Internal  Medicine [phone]            See the Encounter Report to view Anticoagulation Flowsheet and Dosing Calendar (Go to Encounters tab in chart review, and find the Anticoagulation Therapy Visit)    INR is therapeutic today.   Patient will continue same maintenance dose.   Follow up in 4 weeks.        Nova John RN

## 2020-05-06 DIAGNOSIS — M62.830 BACK MUSCLE SPASM: ICD-10-CM

## 2020-05-06 DIAGNOSIS — Z79.899 CONTROLLED SUBSTANCE AGREEMENT SIGNED: ICD-10-CM

## 2020-05-07 NOTE — TELEPHONE ENCOUNTER
Controlled Substance Refill Request for diazepam  Problem List Complete:  No     PROVIDER TO CONSIDER COMPLETION OF PROBLEM LIST AND OVERVIEW/CONTROLLED SUBSTANCE AGREEMENT    Last Written Prescription Date:  3/3/20  Last Fill Quantity: 270,   # refills: 0    Last Office Visit with [facility] primary care provider: 10/29/19    Future Office visit:     Controlled substance agreement:   Encounter-Level CSA - 04/28/2017:    Controlled Substance Agreement - Scan on 5/2/2017  4:41 PM: CONTROLLED SUBSTANCE AGREEMENT     Patient-Level CSA:    Controlled Substance Agreement - Non - Opioid - Scan on 11/6/2019  8:10 AM: NON-OPIOID CONTROLLED SUBSTANCE AGREEMENT         Last Urine Drug Screen: No results found for: CDAUT, No results found for: COMDAT, No results found for: THC13, PCP13, COC13, MAMP13, OPI13, AMP13, BZO13, TCA13, MTD13, BAR13, OXY13, PPX13, BUP13     Processing:  Rx to be electronically transmitted to pharmacy by provider      [email or web address]       checked in past 3 months?  Yes checked- no concerns    Last filled on 3/4/20

## 2020-05-14 RX ORDER — DIAZEPAM 5 MG
TABLET ORAL
Qty: 270 TABLET | Refills: 0 | OUTPATIENT
Start: 2020-05-14

## 2020-05-14 NOTE — TELEPHONE ENCOUNTER
Patient informed via Josafat's message below.    Medication refused with reason: appointment needed.

## 2020-05-14 NOTE — TELEPHONE ENCOUNTER
As covering provider, I am declining Rx refill of Diazepam at this time.  #270 is a lot and no record of cutting back to see if that works.  Due for a virtual/video visit with PCP to review his ongoing Rx need as his last appt was 10/2019.  He also needs a drug urine screen to be done (order placed). Please notify the patient.    Last controlled substance 10/2019  No drug urine screen   system checked ok

## 2020-05-19 ENCOUNTER — ANTICOAGULATION THERAPY VISIT (OUTPATIENT)
Dept: NURSING | Facility: CLINIC | Age: 65
End: 2020-05-19

## 2020-05-19 DIAGNOSIS — I48.91 ATRIAL FIBRILLATION (H): ICD-10-CM

## 2020-05-19 DIAGNOSIS — Z95.2 AORTIC VALVE PROSTHESIS PRESENT: ICD-10-CM

## 2020-05-19 DIAGNOSIS — Z79.01 LONG TERM CURRENT USE OF ANTICOAGULANT THERAPY: Primary | ICD-10-CM

## 2020-05-19 LAB
CAPILLARY BLOOD COLLECTION: NORMAL
INR PPP: 2.6 (ref 0.86–1.14)

## 2020-05-19 PROCEDURE — 36416 COLLJ CAPILLARY BLOOD SPEC: CPT | Performed by: INTERNAL MEDICINE

## 2020-05-19 PROCEDURE — 85610 PROTHROMBIN TIME: CPT | Performed by: INTERNAL MEDICINE

## 2020-05-19 PROCEDURE — 99207 ZZC NO CHARGE NURSE ONLY: CPT

## 2020-05-19 NOTE — PROGRESS NOTES
ANTICOAGULATION FOLLOW-UP     Patient Name:  Todd S Aschoff  Date:  2020  Contact Type:  Telephone    SUBJECTIVE:  Patient Findings     Comments:   The patient was assessed for   diet, medication,   missed or extra doses,   bruising or bleeding,   with no problem findings.          Clinical Outcomes     Comments:   The patient was assessed for   diet, medication,   missed or extra doses,   bruising or bleeding,   with no problem findings.             OBJECTIVE    Recent labs: (last 7 days)     20  1007   INR 2.60*       ASSESSMENT / PLAN  INR assessment THER    Recheck INR In: 4 WEEKS    INR Location Clinic lab     Anticoagulation Summary  As of 2020    INR goal:   2.5-3.5   TTR:   79.4 % (1 y)   INR used for dosin.60 (2020)   Warfarin maintenance plan:   7.5 mg (5 mg x 1.5) every Sun, Thu; 5 mg (5 mg x 1) all other days   Full warfarin instructions:   7.5 mg every Sun, Thu; 5 mg all other days   Weekly warfarin total:   40 mg   No change documented:   Nova John RN   Plan last modified:   Nova John RN (2018)   Next INR check:   2020   Priority:   Maintenance   Target end date:   Indefinite    Indications    Aortic valve prosthesis present [Z95.2]  Atrial fibrillation (H) [I48.91]  Long term current use of anticoagulant therapy [Z79.01]             Anticoagulation Episode Summary     INR check location:       Preferred lab:       Send INR reminders to:   AIDA GUERRERO    Comments:   5mg tabs / CALENDAR / needs bridging      Anticoagulation Care Providers     Provider Role Specialty Phone number    Obdulio Ingram MD [facility] Internal Medicine [phone]            See the Encounter Report to view Anticoagulation Flowsheet and Dosing Calendar (Go to Encounters tab in chart review, and find the Anticoagulation Therapy Visit)    INR is therapeutic today.   Patient will continue same maintenance dose.   Follow up in 4 weeks.        Nova John RN

## 2020-05-23 DIAGNOSIS — F33.42 MAJOR DEPRESSIVE DISORDER, RECURRENT EPISODE, IN FULL REMISSION (H): ICD-10-CM

## 2020-05-27 NOTE — TELEPHONE ENCOUNTER
Due for 6 month follow-up for refill.   Please call patient to schedule virtual visit and ask if refill is needed prior to appointment.

## 2020-05-28 RX ORDER — VENLAFAXINE HYDROCHLORIDE 150 MG/1
CAPSULE, EXTENDED RELEASE ORAL
Qty: 90 CAPSULE | Refills: 0 | OUTPATIENT
Start: 2020-05-28

## 2020-05-28 NOTE — TELEPHONE ENCOUNTER
Contacted patient. Informed him he is due for an appointment. Appointment scheduled for 5/29/2020.     He will have enough medication until his appointment. Request denied.

## 2020-05-29 ENCOUNTER — VIRTUAL VISIT (OUTPATIENT)
Dept: INTERNAL MEDICINE | Facility: CLINIC | Age: 65
End: 2020-05-29
Payer: COMMERCIAL

## 2020-05-29 DIAGNOSIS — I48.20 CHRONIC ATRIAL FIBRILLATION (H): ICD-10-CM

## 2020-05-29 DIAGNOSIS — Z95.2 AORTIC VALVE PROSTHESIS PRESENT: ICD-10-CM

## 2020-05-29 DIAGNOSIS — M54.50 CHRONIC MIDLINE LOW BACK PAIN WITHOUT SCIATICA: Primary | ICD-10-CM

## 2020-05-29 DIAGNOSIS — F33.42 MAJOR DEPRESSIVE DISORDER, RECURRENT EPISODE, IN FULL REMISSION (H): ICD-10-CM

## 2020-05-29 DIAGNOSIS — G89.29 CHRONIC MIDLINE LOW BACK PAIN WITHOUT SCIATICA: Primary | ICD-10-CM

## 2020-05-29 DIAGNOSIS — J31.0 CHRONIC RHINITIS: ICD-10-CM

## 2020-05-29 DIAGNOSIS — R21 RASH: ICD-10-CM

## 2020-05-29 PROCEDURE — 99214 OFFICE O/P EST MOD 30 MIN: CPT | Mod: 95 | Performed by: INTERNAL MEDICINE

## 2020-05-29 RX ORDER — TRIAMCINOLONE ACETONIDE 1 MG/G
CREAM TOPICAL 2 TIMES DAILY PRN
Qty: 30 G | Refills: 1 | Status: ON HOLD | OUTPATIENT
Start: 2020-05-29 | End: 2021-02-07

## 2020-05-29 RX ORDER — CETIRIZINE HYDROCHLORIDE 10 MG/1
TABLET ORAL
Qty: 90 TABLET | Refills: 3 | Status: SHIPPED | OUTPATIENT
Start: 2020-05-29 | End: 2021-02-22

## 2020-05-29 RX ORDER — AZELASTINE 1 MG/ML
SPRAY, METERED NASAL
Qty: 30 ML | Refills: 11 | Status: SHIPPED | OUTPATIENT
Start: 2020-05-29 | End: 2021-06-18

## 2020-05-29 RX ORDER — VENLAFAXINE HYDROCHLORIDE 150 MG/1
CAPSULE, EXTENDED RELEASE ORAL
Qty: 90 CAPSULE | Refills: 0 | Status: SHIPPED | OUTPATIENT
Start: 2020-05-29 | End: 2020-08-26

## 2020-05-29 ASSESSMENT — PATIENT HEALTH QUESTIONNAIRE - PHQ9: SUM OF ALL RESPONSES TO PHQ QUESTIONS 1-9: 0

## 2020-05-29 NOTE — PROGRESS NOTES
"Todd S Aschoff is a 64 year old male who is being evaluated via a billable video visit.      The patient has been notified of following:     \"This video visit will be conducted via a call between you and your physician/provider. We have found that certain health care needs can be provided without the need for an in-person physical exam.  This service lets us provide the care you need with a video conversation.  If a prescription is necessary we can send it directly to your pharmacy.  If lab work is needed we can place an order for that and you can then stop by our lab to have the test done at a later time.    Video visits are billed at different rates depending on your insurance coverage.  Please reach out to your insurance provider with any questions.    If during the course of the call the physician/provider feels a video visit is not appropriate, you will not be charged for this service.\"    Patient has given verbal consent for Video visit? Yes    How would you like to obtain your AVS? Mail a copy    Patient would like the video invitation sent by: Text to cell phone: [phone]    Will anyone else be joining your video visit? No      Subjective     Todd S Aschoff is a 64 year old male who presents today via video visit for the following health issues:    HPI  Hyperlipidemia Follow-Up      Are you regularly taking any medication or supplement to lower your cholesterol?   Yes- SIMVASTATIN    Are you having muscle aches or other side effects that you think could be caused by your cholesterol lowering medication?  No      How many servings of fruits and vegetables do you eat daily?  2-3    On average, how many sweetened beverages do you drink each day (Examples: soda, juice, sweet tea, etc.  Do NOT count diet or artificially sweetened beverages)?   2    How many days per week do you exercise enough to make your heart beat faster? 3 or less    How many minutes a day do you exercise enough to make your heart beat " "faster? 9 or less    How many days per week do you miss taking your medication? 0         Video Start Time: 0924    Patient has overall felt well and requests refills of several medications.    He has chronic low back pain for which he has found relief of spasm and associated pain with regular use of Valium and Flexeril.  He is reminded that every 6-month follow-up for this.    The patient has chronic atrial fibrillation for which he is anticoagulation.  No recent chest discomfort dizziness or palpitations.    The patient has had chronic depression which has been essentially in remission.  His previous psychiatrist, Yadi.,  He has left and the patient and I cannot continue to refill psychiatric medications.    The patient is also on medications for mild cognitive changes and overactive bladder, for hypertension, hypothyroidism, hyperlipidemia.  He intends to return to our office in the coming months for annual exam, at which time labs may be updated.      The patient points out a small rash and itching in his lower abdomen just below the umbilicus, in the region where he had previously self-administered Lovenox injections.  He has tried using topical\" treatment without benefit.  We discussed that he might benefit from a prescription for corticosteroid topically to alleviate symptoms.    Past medical, family and social histories as well as medications reviewed and updated as needed.    Reviewed and updated as needed this visit by Provider         Review of Systems   REVIEW OF SYSTEMS: The following systems have been completely reviewed and are negative except as noted above:   Constitutional, HEENT, respiratory, cardiovascular, musculoskeletal, dermatologic, hematologic, psychiatric, and neurologic systems.        Objective    There were no vitals taken for this visit.  Estimated body mass index is 42.09 kg/m  as calculated from the following:    Height as of 3/6/20: 1.854 m (6' 1\").    Weight as of 3/6/20: 144.7 kg " "(319 lb).  Physical Exam     GENERAL: Healthy, alert and no distress  EYES: Eyes grossly normal to inspection.  No discharge or erythema, or obvious scleral/conjunctival abnormalities.  RESP: No audible wheeze, cough, or visible cyanosis.  No visible retractions or increased work of breathing.    SKIN: minimal area of macular patchy erythema below and around his umbilicus. No abnormal pigmentation or lesions.  NEURO: Cranial nerves grossly intact.  Mentation and speech appropriate for age.  PSYCH: Mentation appears normal, affect normal/bright, judgement and insight intact, normal speech and appearance well-groomed.      Diagnostic Test Results:  Labs reviewed in Epic        Assessment & Plan     (M54.5,  G89.29) Chronic midline low back pain without sciatica  (primary encounter diagnosis)  Comment: Will continue to refill Valium and Flexeril monthly regimen.    (J31.0) Chronic rhinitis  Comment: Stable. .Continue current meds.   Plan: cetirizine (ZYRTEC) 10 MG tablet, azelastine (ASTELIN) 0.1 % nasal spray    (F33.42) Major depressive disorder, recurrent episode, in full remission (H)  Comment: Well controlled. Continue current meds.     (I48.20) Chronic atrial fibrillation  (Z95.2) Aortic valve prosthesis present  Comment: Stable. Continue current meds.     (R21) Rash  Comment: Will try empiric topical corticosteroid.   Plan: triamcinolone (KENALOG) 0.1 % external cream            BMI:   Estimated body mass index is 42.09 kg/m  as calculated from the following:    Height as of 3/6/20: 1.854 m (6' 1\").    Weight as of 3/6/20: 144.7 kg (319 lb).    Return in about 5 months (around 10/29/2020) for Physical Exam.    Obdulio Ingram MD  [facility]      Video-Visit Details    Type of service:  Video Visit    Video End Time:7732    Originating Location (pt. Location): Home    Distant Location (provider location):  [facility] --provider working from home office     Platform used for Video " Visit: Doximity    No follow-ups on file.       Obdulio Ingram MD,

## 2020-05-29 NOTE — TELEPHONE ENCOUNTER
"Pt calling to check on status of refill request below. He stated this was supposed to be filled at today's video visit with Dr. Ingram. Please advise. Thanks.    Requested Prescriptions   Pending Prescriptions Disp Refills     venlafaxine (EFFEXOR-XR) 150 MG 24 hr capsule [Pharmacy Med Name: Venlafaxine HCl ER Oral Capsule Extended Release 24 Hour 150 MG]  Last Written Prescription Date:  3/3/20  Last Fill Quantity: 90,  # refills: 0   Last office visit: 5/29/20 with prescribing provider:  Nataly   Future Office Visit:         90 capsule 0     Sig: TAKE ONE CAPSULE BY MOUTH DAILY WITH BREAKFAST       Serotonin-Norepinephrine Reuptake Inhibitors  Failed - 5/29/2020  4:51 PM        Failed - Blood pressure under 140/90 in past 12 months     BP Readings from Last 3 Encounters:   03/06/20 (!) 164/86   02/17/20 130/80   01/22/20 130/78                 Passed - PHQ-9 score of less than 5 in past 6 months     Please review last PHQ-9 score.           Passed - Medication is active on med list        Passed - Patient is age 18 or older        Passed - Normal serum creatinine on file in past 12 months     Recent Labs   Lab Test 10/29/19  1024   CR 1.04       Ok to refill medication if creatinine is low          Passed - Recent (6 mo) or future (30 days) visit within the authorizing provider's specialty     Patient had office visit in the last 6 months or has a visit in the next 30 days with authorizing provider or within the authorizing provider's specialty.  See \"Patient Info\" tab in inbasket, or \"Choose Columns\" in Meds & Orders section of the refill encounter.               "

## 2020-06-16 ENCOUNTER — ANTICOAGULATION THERAPY VISIT (OUTPATIENT)
Dept: ANTICOAGULATION | Facility: CLINIC | Age: 65
End: 2020-06-16

## 2020-06-16 DIAGNOSIS — I48.91 ATRIAL FIBRILLATION (H): ICD-10-CM

## 2020-06-16 DIAGNOSIS — Z79.01 LONG TERM CURRENT USE OF ANTICOAGULANT THERAPY: ICD-10-CM

## 2020-06-16 DIAGNOSIS — Z95.2 AORTIC VALVE PROSTHESIS PRESENT: ICD-10-CM

## 2020-06-16 LAB
CAPILLARY BLOOD COLLECTION: NORMAL
INR PPP: 2.8 (ref 0.86–1.14)

## 2020-06-16 PROCEDURE — 85610 PROTHROMBIN TIME: CPT | Performed by: INTERNAL MEDICINE

## 2020-06-16 PROCEDURE — 99207 ZZC NO CHARGE NURSE ONLY: CPT | Performed by: INTERNAL MEDICINE

## 2020-06-16 PROCEDURE — 36416 COLLJ CAPILLARY BLOOD SPEC: CPT | Performed by: INTERNAL MEDICINE

## 2020-06-16 NOTE — PROGRESS NOTES
ANTICOAGULATION FOLLOW-UP CLINIC VISIT    Patient Name:  Todd S Aschoff  Date:  2020  Contact Type:  Telephone/ Called patient, he denies any changes or missed warfarin doses. Patient reported an extra dose 4 weeks ago. Orders discussed with patient.     SUBJECTIVE:  Patient Findings     Positives:   Extra doses (Patient reports taking 5 mg extra warfarin 4 weeks ago.)    Comments:   The patient was assessed for diet, medication, and activity level changes, missed or extra doses, bruising or bleeding, with no problem findings.          Clinical Outcomes     Comments:   The patient was assessed for diet, medication, and activity level changes, missed or extra doses, bruising or bleeding, with no problem findings.             OBJECTIVE    Recent labs: (last 7 days)     20  1122   INR 2.80*       ASSESSMENT / PLAN  INR assessment THER    Recheck INR In: 4 WEEKS    INR Location Outside lab      Anticoagulation Summary  As of 2020    INR goal:   2.5-3.5   TTR:   82.8 % (1 y)   INR used for dosin.80 (2020)   Warfarin maintenance plan:   7.5 mg (5 mg x 1.5) every Sun, Thu; 5 mg (5 mg x 1) all other days   Full warfarin instructions:   7.5 mg every Sun, Thu; 5 mg all other days   Weekly warfarin total:   40 mg   Plan last modified:   Nova John RN (2018)   Next INR check:   2020   Priority:   Maintenance   Target end date:   Indefinite    Indications    Aortic valve prosthesis present [Z95.2]  Atrial fibrillation (H) [I48.91]  Long term current use of anticoagulant therapy [Z79.01]             Anticoagulation Episode Summary     INR check location:       Preferred lab:       Send INR reminders to:   AIDA GUERRERO    Comments:   5mg tabs / CALENDAR / needs bridging      Anticoagulation Care Providers     Provider Role Specialty Phone number    Obdulio Ingram MD [facility] Internal Medicine [phone]            See the Encounter Report to view Anticoagulation Flowsheet and  Dosing Calendar (Go to Encounters tab in chart review, and find the Anticoagulation Therapy Visit)    Dosage adjustment made based on physician directed care plan.    Lucina Andrew RN

## 2020-07-14 ENCOUNTER — ANTICOAGULATION THERAPY VISIT (OUTPATIENT)
Dept: NURSING | Facility: CLINIC | Age: 65
End: 2020-07-14

## 2020-07-14 ENCOUNTER — DOCUMENTATION ONLY (OUTPATIENT)
Dept: INTERNAL MEDICINE | Facility: CLINIC | Age: 65
End: 2020-07-14

## 2020-07-14 DIAGNOSIS — I48.91 ATRIAL FIBRILLATION (H): ICD-10-CM

## 2020-07-14 DIAGNOSIS — Z79.01 LONG TERM CURRENT USE OF ANTICOAGULANT THERAPY: Primary | ICD-10-CM

## 2020-07-14 DIAGNOSIS — Z95.2 AORTIC VALVE PROSTHESIS PRESENT: ICD-10-CM

## 2020-07-14 DIAGNOSIS — I48.91 ATRIAL FIBRILLATION (H): Primary | ICD-10-CM

## 2020-07-14 DIAGNOSIS — Z79.01 LONG TERM CURRENT USE OF ANTICOAGULANT THERAPY: ICD-10-CM

## 2020-07-14 DIAGNOSIS — I48.20 CHRONIC ATRIAL FIBRILLATION (H): ICD-10-CM

## 2020-07-14 LAB
CAPILLARY BLOOD COLLECTION: NORMAL
INR PPP: 4.2 (ref 0.86–1.14)

## 2020-07-14 PROCEDURE — 36416 COLLJ CAPILLARY BLOOD SPEC: CPT | Performed by: INTERNAL MEDICINE

## 2020-07-14 PROCEDURE — 85610 PROTHROMBIN TIME: CPT | Performed by: INTERNAL MEDICINE

## 2020-07-14 PROCEDURE — 99207 ZZC NO CHARGE NURSE ONLY: CPT

## 2020-07-14 NOTE — PROGRESS NOTES
ANTICOAGULATION MANAGEMENT      Todd S Aschoff due for annual renewal of referral to anticoagulation monitoring. Order pended for your review and signature.      ANTICOAGULATION SUMMARY      Warfarin indication(s)     Atrial fibrillation  Heart Valve Replacement    Heart valve present?  Mechanical  AVR-Bileaflet       Current goal range   INR: 2.5-3.5     Goal appropriate for indication? Yes, INR 2.5-3.5 appropriate for hx  Mechanical MVR, Mechanical AVR with risk factors or older generation (Laura-Shiley (Tilting disc), Rizo-Farmer (Caged Ball) or Monoleaflet valve)     Current duration of therapy Indefinite/long term therapy   Time in Therapeutic Range (TTR)  (Goal > 60%) 79 %       Office visit with referring provider's group within last year Yes, on 5/29/2020       Nova John RN

## 2020-07-14 NOTE — PROGRESS NOTES
ANTICOAGULATION FOLLOW-UP     Patient Name:  Todd S Aschoff  Date:  2020  Contact Type:  Telephone    SUBJECTIVE:  Patient Findings     Comments:   Reviewed previous warfarin dosing with patient.  Patient denies: extra doses, illness, inflammation,   bleeding/bruises, medication changes, diet changes,  or any alcohol intake.    INR is supratherapeutic today.   Patient will hold dose today which will decrease weekly total by 12%, then resume maintenance dose.   Will follow up in 2 weeks.        Clinical Outcomes     Comments:   Reviewed previous warfarin dosing with patient.  Patient denies: extra doses, illness, inflammation,   bleeding/bruises, medication changes, diet changes,  or any alcohol intake.    INR is supratherapeutic today.   Patient will hold dose today which will decrease weekly total by 12%, then resume maintenance dose.   Will follow up in 2 weeks.           OBJECTIVE    Recent labs: (last 7 days)     20  1211   INR 4.20*       ASSESSMENT / PLAN  INR assessment SUPRA    Recheck INR In: 2 WEEKS    INR Location Clinic lab     Anticoagulation Summary  As of 2020    INR goal:   2.5-3.5   TTR:   79.0 % (1 y)   INR used for dosin.20! (2020)   Warfarin maintenance plan:   7.5 mg (5 mg x 1.5) every Sun, Thu; 5 mg (5 mg x 1) all other days   Full warfarin instructions:   : Hold; Otherwise 7.5 mg every Sun, Thu; 5 mg all other days   Weekly warfarin total:   40 mg   Plan last modified:   Nova John RN (2018)   Next INR check:   2020   Priority:   Maintenance   Target end date:   Indefinite    Indications    Aortic valve prosthesis present [Z95.2]  Atrial fibrillation (H) [I48.91]  Long term current use of anticoagulant therapy [Z79.01]             Anticoagulation Episode Summary     INR check location:       Preferred lab:       Send INR reminders to:   AIDA GUERRERO    Comments:   5mg tabs / CALENDAR / needs bridging      Anticoagulation Care Providers      Provider Role Specialty Phone number    Obdulio Ingram MD Responsible Internal Medicine [phone]            See the Encounter Report to view Anticoagulation Flowsheet and Dosing Calendar (Go to Encounters tab in chart review, and find the Anticoagulation Therapy Visit)    INR is supratherapeutic today.   Patient will hold dose today which will decrease weekly total   by 12%, then resume maintenance dose.   Will follow up in 2 weeks.    Dosage adjustment made based on physician directed care plan.      Nova John RN

## 2020-07-18 PROBLEM — I48.20 CHRONIC ATRIAL FIBRILLATION (H): Status: ACTIVE | Noted: 2020-07-18

## 2020-07-28 ENCOUNTER — ANTICOAGULATION THERAPY VISIT (OUTPATIENT)
Dept: ANTICOAGULATION | Facility: CLINIC | Age: 65
End: 2020-07-28

## 2020-07-28 DIAGNOSIS — I48.91 ATRIAL FIBRILLATION (H): ICD-10-CM

## 2020-07-28 DIAGNOSIS — I48.20 CHRONIC ATRIAL FIBRILLATION (H): ICD-10-CM

## 2020-07-28 DIAGNOSIS — Z79.01 LONG TERM CURRENT USE OF ANTICOAGULANT THERAPY: ICD-10-CM

## 2020-07-28 DIAGNOSIS — Z95.2 AORTIC VALVE PROSTHESIS PRESENT: ICD-10-CM

## 2020-07-28 LAB — INR PPP: 3.6 (ref 0.86–1.14)

## 2020-07-28 PROCEDURE — 85610 PROTHROMBIN TIME: CPT | Performed by: INTERNAL MEDICINE

## 2020-07-28 PROCEDURE — 99207 ZZC NO CHARGE NURSE ONLY: CPT | Performed by: INTERNAL MEDICINE

## 2020-07-28 PROCEDURE — 36416 COLLJ CAPILLARY BLOOD SPEC: CPT | Performed by: INTERNAL MEDICINE

## 2020-07-28 NOTE — PROGRESS NOTES
Anticoagulation Management    Unable to reach Nicko today.    Today's INR result of 3.6 is supratherapeutic (goal INR of 2.5-3.5).  Result received from: Clinic Lab    Follow up required to confirm warfarin dose taken and assess for changes    Left message to continue current dose of warfarin 5 mg tonight.      Anticoagulation clinic to follow up    Amanda Wheeler RN    ANTICOAGULATION FOLLOW-UP CLINIC VISIT    Patient Name:  Todd S Aschoff  Date:  7/29/2020  Contact Type:  Telephone/ discussed with patient    SUBJECTIVE:  Patient Findings     Comments:   The patient was assessed for diet, medication, and activity level changes, missed or extra doses, bruising or bleeding, with no problem findings.  Patient denies any identifiable changes that caused the supra therapeutic INR.  Would consider adjusting maintenance dose with next INR check if INR remains elevated.  Patient's wife is currently in [facility].  He will call to schedule his next INR appointment since his wife will also have follow up visits to do once she is discharged.           Clinical Outcomes     Negatives:   Major bleeding event, Thromboembolic event, Anticoagulation-related hospital admission, Anticoagulation-related ED visit, Anticoagulation-related fatality    Comments:   The patient was assessed for diet, medication, and activity level changes, missed or extra doses, bruising or bleeding, with no problem findings.  Patient denies any identifiable changes that caused the supra therapeutic INR.  Would consider adjusting maintenance dose with next INR check if INR remains elevated.  Patient's wife is currently in [facility].  He will call to schedule his next INR appointment since his wife will also have follow up visits to do once she is discharged.              OBJECTIVE    Recent labs: (last 7 days)     07/28/20  1154   INR 3.60*       ASSESSMENT / PLAN  INR assessment SUPRA    Recheck INR In: 2 WEEKS    INR Location Clinic       Anticoagulation Summary  As of 7/28/2020    INR goal:   2.5-3.5   TTR:   75.1 % (1 y)   INR used for dosing:   3.60! (7/28/2020)   Warfarin maintenance plan:   7.5 mg (5 mg x 1.5) every Sun, Thu; 5 mg (5 mg x 1) all other days   Full warfarin instructions:   7.5 mg every Sun, Thu; 5 mg all other days   Weekly warfarin total:   40 mg   Plan last modified:   Nova John RN (12/27/2018)   Next INR check:   8/11/2020   Priority:   Maintenance   Target end date:   Indefinite    Indications    Aortic valve prosthesis present [Z95.2]  Atrial fibrillation (H) [I48.91]  Long term current use of anticoagulant therapy [Z79.01]  Chronic atrial fibrillation (H) [I48.20]             Anticoagulation Episode Summary     INR check location:       Preferred lab:       Send INR reminders to:   AIDA GUERRERO    Comments:   5mg tabs / CALENDAR / needs bridging      Anticoagulation Care Providers     Provider Role Specialty Phone number    Obdulio Ingram MD Referring Internal Medicine [phone]            See the Encounter Report to view Anticoagulation Flowsheet and Dosing Calendar (Go to Encounters tab in chart review, and find the Anticoagulation Therapy Visit)    Dosage adjustment made based on physician directed care plan.    Amanda Wheeler RN

## 2020-08-11 DIAGNOSIS — Z79.01 CURRENT USE OF LONG TERM ANTICOAGULATION: ICD-10-CM

## 2020-08-11 NOTE — TELEPHONE ENCOUNTER
Pending Prescriptions:                       Disp   Refills    warfarin ANTICOAGULANT (COUMADIN) 5 MG tab*100 ta*0        Sig: Take 1.5 tablets (7.5 mg) by mouth Sun & Thu; take 1           tablet (5 mg) Mon, Tue, Wed, Fri, Sat or as           instructed by INR Clinic.

## 2020-08-12 RX ORDER — WARFARIN SODIUM 5 MG/1
TABLET ORAL
Qty: 100 TABLET | Refills: 0 | Status: SHIPPED | OUTPATIENT
Start: 2020-08-12 | End: 2020-11-16

## 2020-08-12 NOTE — TELEPHONE ENCOUNTER
"Requested Prescriptions   Pending Prescriptions Disp Refills     warfarin ANTICOAGULANT (COUMADIN) 5 MG tablet 100 tablet 0     Sig: Take 1.5 tablets (7.5 mg) by mouth Sun & Thu; take 1 tablet (5 mg) Mon, Tue, Wed, Fri, Sat or as instructed by INR Clinic.       Vitamin K Antagonists Failed - 8/11/2020  9:06 AM        Failed - INR is within goal in the past 6 weeks     Confirm INR is within goal in the past 6 weeks.     Recent Labs   Lab Test 07/28/20  1154   INR 3.60*                       Passed - Recent (12 mo) or future (30 days) visit within the authorizing provider's specialty     Patient has had an office visit with the authorizing provider or a provider within the authorizing providers department within the previous 12 mos or has a future within next 30 days. See \"Patient Info\" tab in inbasket, or \"Choose Columns\" in Meds & Orders section of the refill encounter.              Passed - Medication is active on med list        Passed - Patient is 18 years of age or older           Last office visit 5/29/20.  Warfarin dosing is managed by INR Clinic.  Prescription approved per [facility] Refill Protocol.  Amanda Wheeler RN    "

## 2020-08-13 ENCOUNTER — ANTICOAGULATION THERAPY VISIT (OUTPATIENT)
Dept: NURSING | Facility: CLINIC | Age: 65
End: 2020-08-13

## 2020-08-13 DIAGNOSIS — Z79.01 LONG TERM CURRENT USE OF ANTICOAGULANT THERAPY: ICD-10-CM

## 2020-08-13 DIAGNOSIS — I48.91 ATRIAL FIBRILLATION (H): ICD-10-CM

## 2020-08-13 DIAGNOSIS — Z95.2 AORTIC VALVE PROSTHESIS PRESENT: ICD-10-CM

## 2020-08-13 DIAGNOSIS — I48.20 CHRONIC ATRIAL FIBRILLATION (H): ICD-10-CM

## 2020-08-13 LAB
CAPILLARY BLOOD COLLECTION: NORMAL
INR PPP: 3.7 (ref 0.86–1.14)

## 2020-08-13 PROCEDURE — 36416 COLLJ CAPILLARY BLOOD SPEC: CPT | Performed by: INTERNAL MEDICINE

## 2020-08-13 PROCEDURE — 99207 ZZC NO CHARGE NURSE ONLY: CPT

## 2020-08-13 PROCEDURE — 85610 PROTHROMBIN TIME: CPT | Performed by: INTERNAL MEDICINE

## 2020-08-13 NOTE — PROGRESS NOTES
Anticoagulation Management    Unable to reach Nicko today.    Today's INR result of 3.7 is supratherapeutic (goal INR of 2.5-3.5).  Result received from: Clinic Lab    Follow up required to:   Confirm warfarin dose taken and assess for changes  Discuss out of range INR   Discuss dosing instructions and confirm understanding of instructions  Schedule next INR appointment    Left message to take reduced dose of warfarin, 5 mg tonight.  Left message to call back.      Anticoagulation clinic to follow up    [facility]

## 2020-08-14 NOTE — PROGRESS NOTES
ANTICOAGULATION FOLLOW-UP     Patient Name:  Todd S Aschoff  Date:  8/14/2020  Contact Type:  Telephone/ spoke to patient    SUBJECTIVE:  Patient Findings     Comments:   Reviewed previous warfarin dosing with patient.  He did get the message from yesterday and took 5 mg last night.  Patient denies: extra doses, illness, inflammation,   bleeding/bruises, medication changes, diet changes.    His wife was recently in the hospital for a fractured ankle.    INR was supratherapeutic yesterday. INR has been above goal range the past 3 times.  Patient will decrease weekly maintenance dose total by 6.3%.  Will follow up in 2 weeks.        Clinical Outcomes     Comments:   Reviewed previous warfarin dosing with patient.  He did get the message from yesterday and took 5 mg last night.  Patient denies: extra doses, illness, inflammation,   bleeding/bruises, medication changes, diet changes.    His wife was recently in the hospital for a fractured ankle.    INR was supratherapeutic yesterday. INR has been above goal range the past 3 times.  Patient will decrease weekly maintenance dose total by 6.3%.  Will follow up in 2 weeks.           OBJECTIVE    Recent labs: (last 7 days)     08/13/20  1518   INR 3.70*       ASSESSMENT / PLAN  INR assessment SUPRA    Recheck INR In: 2 WEEKS    INR Location Clinic lab     Anticoagulation Summary  As of 8/13/2020    INR goal:   2.5-3.5   TTR:   70.6 % (1 y)   INR used for dosing:   3.70! (8/13/2020)   Warfarin maintenance plan:   7.5 mg (5 mg x 1.5) every Sun; 5 mg (5 mg x 1) all other days   Full warfarin instructions:   8/13: 5 mg; Otherwise 7.5 mg every Sun; 5 mg all other days   Weekly warfarin total:   37.5 mg   Plan last modified:   Nova John, RN (8/14/2020)   Next INR check:   8/27/2020   Priority:   Maintenance   Target end date:   Indefinite    Indications    Aortic valve prosthesis present [Z95.2]  Atrial fibrillation (H) [I48.91]  Long term current use of anticoagulant  therapy [Z79.01]  Chronic atrial fibrillation (H) [I48.20]             Anticoagulation Episode Summary     INR check location:       Preferred lab:       Send INR reminders to:   AIDA GUERRERO    Comments:   5mg tabs / CALENDAR / needs bridging      Anticoagulation Care Providers     Provider Role Specialty Phone number    Obdulio Ingram MD Referring Internal Medicine [phone]            See the Encounter Report to view Anticoagulation Flowsheet and Dosing Calendar (Go to Encounters tab in chart review, and find the Anticoagulation Therapy Visit)    INR was supratherapeutic yesterday.   Patient will decrease weekly maintenance dose total by 6.3%.  Will follow up in 2 weeks.    Dosage adjustment made based on physician directed care plan.        Nova John RN

## 2020-08-24 ENCOUNTER — TELEPHONE (OUTPATIENT)
Dept: INTERNAL MEDICINE | Facility: CLINIC | Age: 65
End: 2020-08-24

## 2020-08-26 ENCOUNTER — ANTICOAGULATION THERAPY VISIT (OUTPATIENT)
Dept: INTERNAL MEDICINE | Facility: CLINIC | Age: 65
End: 2020-08-26

## 2020-08-26 DIAGNOSIS — Z79.01 LONG TERM CURRENT USE OF ANTICOAGULANT THERAPY: ICD-10-CM

## 2020-08-26 DIAGNOSIS — I48.20 CHRONIC ATRIAL FIBRILLATION (H): ICD-10-CM

## 2020-08-26 DIAGNOSIS — I48.91 ATRIAL FIBRILLATION (H): ICD-10-CM

## 2020-08-26 DIAGNOSIS — Z95.2 AORTIC VALVE PROSTHESIS PRESENT: ICD-10-CM

## 2020-08-26 LAB
CAPILLARY BLOOD COLLECTION: NORMAL
INR PPP: 4.1 (ref 0.86–1.14)

## 2020-08-26 PROCEDURE — 36416 COLLJ CAPILLARY BLOOD SPEC: CPT | Performed by: INTERNAL MEDICINE

## 2020-08-26 PROCEDURE — 99207 ZZC NO CHARGE NURSE ONLY: CPT | Performed by: INTERNAL MEDICINE

## 2020-08-26 PROCEDURE — 85610 PROTHROMBIN TIME: CPT | Performed by: INTERNAL MEDICINE

## 2020-08-26 NOTE — PROGRESS NOTES
ANTICOAGULATION FOLLOW-UP CLINIC VISIT    Patient Name:  Todd S Aschoff  Date:  2020  Contact Type:  Telephone/ Nicko    SUBJECTIVE:  Patient Findings     Comments:   Patient denies any identifiable changes that caused the supratherapeutic INR. 4th supra INR in the last 6 weeks. Has had two maintenance dose decreases but no real change in INR. Notes back pain in the last couple of weeks but denies increased alcohol intake, change in K, change in warfarin brand, change in other meds or other symptoms.           Clinical Outcomes     Negatives:   Major bleeding event, Thromboembolic event, Anticoagulation-related hospital admission, Anticoagulation-related ED visit, Anticoagulation-related fatality    Comments:   Patient denies any identifiable changes that caused the supratherapeutic INR. 4th supra INR in the last 6 weeks. Has had two maintenance dose decreases but no real change in INR. Notes back pain in the last couple of weeks but denies increased alcohol intake, change in K, change in warfarin brand, change in other meds or other symptoms.              OBJECTIVE    Recent labs: (last 7 days)     20  1202   INR 4.10*       ASSESSMENT / PLAN  INR assessment SUPRA    Recheck INR In: 2 WEEKS    INR Location Clinic      Anticoagulation Summary  As of 2020    INR goal:   2.5-3.5   TTR:   67.2 % (1 y)   INR used for dosin.10! (2020)   Warfarin maintenance plan:   2.5 mg (5 mg x 0.5) every Wed; 5 mg (5 mg x 1) all other days   Full warfarin instructions:   : Hold; Otherwise 2.5 mg every Wed; 5 mg all other days   Weekly warfarin total:   32.5 mg   Plan last modified:   Tristan Gamboa RN (2020)   Next INR check:   2020   Priority:   Maintenance   Target end date:   Indefinite    Indications    Aortic valve prosthesis present [Z95.2]  Atrial fibrillation (H) [I48.91]  Long term current use of anticoagulant therapy [Z79.01]  Chronic atrial fibrillation (H) [I48.20]              Anticoagulation Episode Summary     INR check location:       Preferred lab:       Send INR reminders to:   AIDA GUERRERO    Comments:   5mg tabs / CALENDAR / needs bridging      Anticoagulation Care Providers     Provider Role Specialty Phone number    Obdulio Ingram MD Referring Internal Medicine [phone]            See the Encounter Report to view Anticoagulation Flowsheet and Dosing Calendar (Go to Encounters tab in chart review, and find the Anticoagulation Therapy Visit)    Patient INR is supra therapeutic today.  Patient will adjust dosing today by holding and then decrease maintenance dosing to 32.5 mg and follow up in 2 weeks or sooner if there are any concerns or problems.    Signs of bleeding and when appropriate to seek care for symptoms reviewed.    Adjustment Rational: 13%  Dosage adjustment made based on physician directed care plan.      Tristan Gamboa RN

## 2020-08-31 ENCOUNTER — TELEPHONE (OUTPATIENT)
Dept: INTERNAL MEDICINE | Facility: CLINIC | Age: 65
End: 2020-08-31

## 2020-08-31 DIAGNOSIS — Z71.89 ADVANCED DIRECTIVES, COUNSELING/DISCUSSION: ICD-10-CM

## 2020-08-31 DIAGNOSIS — R21 RASH: Primary | ICD-10-CM

## 2020-08-31 NOTE — TELEPHONE ENCOUNTER
Patient calling  He is still having issues with a rash in the belly button area. No cream has helped. He has had the rash for a few months. Please advise. Ok to call and zachary [phone]

## 2020-09-01 NOTE — TELEPHONE ENCOUNTER
Called patient, he has been using Triamcinolone cream Dr. Ingram ordered at 5/29/2020 virtual appointment. Rash is about the same, but he does have a few spots that are similar to bumps you get with shaving.     Routed to Dr. Ingram to review if alternative treatment recommended or if patient should see Dermatology.

## 2020-09-06 DIAGNOSIS — R41.3 MEMORY DIFFICULTIES: ICD-10-CM

## 2020-09-08 RX ORDER — DONEPEZIL HYDROCHLORIDE 10 MG/1
TABLET, FILM COATED ORAL
Qty: 90 TABLET | Refills: 1 | Status: ON HOLD | OUTPATIENT
Start: 2020-09-08 | End: 2020-12-02

## 2020-09-08 NOTE — TELEPHONE ENCOUNTER
Prescription approved per [facility], [facility] or [facility] refill protocol.  Nanci QUIROZ - Registered Nurse  [facility]  Acute and Diagnostic Services

## 2020-09-09 ENCOUNTER — ANTICOAGULATION THERAPY VISIT (OUTPATIENT)
Dept: ANTICOAGULATION | Facility: CLINIC | Age: 65
End: 2020-09-09

## 2020-09-09 DIAGNOSIS — I48.91 ATRIAL FIBRILLATION (H): ICD-10-CM

## 2020-09-09 DIAGNOSIS — Z95.2 AORTIC VALVE PROSTHESIS PRESENT: ICD-10-CM

## 2020-09-09 DIAGNOSIS — Z79.01 LONG TERM CURRENT USE OF ANTICOAGULANT THERAPY: ICD-10-CM

## 2020-09-09 DIAGNOSIS — I48.20 CHRONIC ATRIAL FIBRILLATION (H): ICD-10-CM

## 2020-09-09 DIAGNOSIS — M62.838 MUSCLE SPASM: ICD-10-CM

## 2020-09-09 LAB
CAPILLARY BLOOD COLLECTION: NORMAL
INR PPP: 2.9 (ref 0.86–1.14)

## 2020-09-09 PROCEDURE — 36416 COLLJ CAPILLARY BLOOD SPEC: CPT | Performed by: INTERNAL MEDICINE

## 2020-09-09 PROCEDURE — 99207 ZZC NO CHARGE NURSE ONLY: CPT | Performed by: INTERNAL MEDICINE

## 2020-09-09 PROCEDURE — 85610 PROTHROMBIN TIME: CPT | Performed by: INTERNAL MEDICINE

## 2020-09-09 RX ORDER — CYCLOBENZAPRINE HCL 10 MG
TABLET ORAL
Qty: 270 TABLET | Refills: 0 | Status: ON HOLD | OUTPATIENT
Start: 2020-09-09 | End: 2021-02-07

## 2020-09-09 NOTE — TELEPHONE ENCOUNTER
Pending Prescriptions:                       Disp   Refills    cyclobenzaprine (FLEXERIL) 10 MG tablet [*270 ta*0            Sig: TAKE ONE TABLET BY MOUTH THREE TIMES DAILY AS           NEEDED FOR MUSCLE SPASM.     Routing refill request to provider for review/approval because:  Drug not on the [facility] refill protocol     Routed to covering provider - Emily Taylor, to review.

## 2020-09-09 NOTE — PROGRESS NOTES
ANTICOAGULATION FOLLOW-UP CLINIC VISIT    Patient Name:  Todd S Aschoff  Date:  2020  Contact Type:  Telephone/ discussed with patient    SUBJECTIVE:  Patient Findings     Positives:   Upcoming invasive procedure (Will have spinal injection soon.  He will call once this is scheduled.  He is aware that he will need to hold the warfarin and has lovenox for bridging.), Missed doses (Patient states that he held warfarin for 2 days after his last INR check.)    Comments:   The patient was assessed for diet, medication, and activity level changes, extra doses, bruising or bleeding, with no problem findings.          Clinical Outcomes     Negatives:   Major bleeding event, Thromboembolic event, Anticoagulation-related hospital admission, Anticoagulation-related ED visit, Anticoagulation-related fatality    Comments:   The patient was assessed for diet, medication, and activity level changes, extra doses, bruising or bleeding, with no problem findings.             OBJECTIVE    Recent labs: (last 7 days)     20  1154   INR 2.90*       ASSESSMENT / PLAN  INR assessment THER    Recheck INR In: 2 WEEKS    INR Location Clinic      Anticoagulation Summary  As of 2020    INR goal:   2.5-3.5   TTR:   65.3 % (1 y)   INR used for dosin.90 (2020)   Warfarin maintenance plan:   2.5 mg (5 mg x 0.5) every Wed; 5 mg (5 mg x 1) all other days   Full warfarin instructions:   2.5 mg every Wed; 5 mg all other days   Weekly warfarin total:   32.5 mg   Plan last modified:   Tristan Gamboa RN (2020)   Next INR check:   2020   Priority:   Maintenance   Target end date:   Indefinite    Indications    Aortic valve prosthesis present [Z95.2]  Atrial fibrillation (H) [I48.91]  Long term current use of anticoagulant therapy [Z79.01]  Chronic atrial fibrillation (H) [I48.20]             Anticoagulation Episode Summary     INR check location:       Preferred lab:       Send INR reminders to:   AIDA GUERRERO     Comments:   5mg tabs / CALENDAR / needs bridging      Anticoagulation Care Providers     Provider Role Specialty Phone number    Obdulio Ingram MD Referring Internal Medicine [phone]            See the Encounter Report to view Anticoagulation Flowsheet and Dosing Calendar (Go to Encounters tab in chart review, and find the Anticoagulation Therapy Visit)    Dosage adjustment made based on physician directed care plan.    Amanda Wheeler RN

## 2020-09-09 NOTE — TELEPHONE ENCOUNTER
Patient informed Dr. Ingram recommends he see Dermatology and placed referral to [facility]. Patient has an INR appointment there today and will schedule dermatology appointment while he is there.     Patient also asks about Donepezil refill, informed patient this was approved yesterday.

## 2020-09-15 NOTE — TELEPHONE ENCOUNTER
2nd request from [facility], placed in Emily Taylor's in basket to complete when she returns on 9/17/20

## 2020-09-17 ENCOUNTER — MEDICAL CORRESPONDENCE (OUTPATIENT)
Dept: HEALTH INFORMATION MANAGEMENT | Facility: CLINIC | Age: 65
End: 2020-09-17

## 2020-09-23 LAB — INR PPP: 1.3 (ref 0.9–1.1)

## 2020-09-28 ENCOUNTER — ANTICOAGULATION THERAPY VISIT (OUTPATIENT)
Dept: NURSING | Facility: CLINIC | Age: 65
End: 2020-09-28

## 2020-09-28 DIAGNOSIS — M54.50 CHRONIC BILATERAL LOW BACK PAIN WITHOUT SCIATICA: ICD-10-CM

## 2020-09-28 DIAGNOSIS — G89.29 CHRONIC BILATERAL LOW BACK PAIN WITHOUT SCIATICA: ICD-10-CM

## 2020-09-28 DIAGNOSIS — I48.20 CHRONIC ATRIAL FIBRILLATION (H): ICD-10-CM

## 2020-09-28 DIAGNOSIS — M62.830 BACK MUSCLE SPASM: ICD-10-CM

## 2020-09-28 DIAGNOSIS — Z79.899 CONTROLLED SUBSTANCE AGREEMENT SIGNED: ICD-10-CM

## 2020-09-28 DIAGNOSIS — I48.91 ATRIAL FIBRILLATION (H): ICD-10-CM

## 2020-09-28 DIAGNOSIS — Z95.2 AORTIC VALVE PROSTHESIS PRESENT: ICD-10-CM

## 2020-09-28 DIAGNOSIS — Z79.01 LONG TERM CURRENT USE OF ANTICOAGULANT THERAPY: ICD-10-CM

## 2020-09-28 LAB
CAPILLARY BLOOD COLLECTION: NORMAL
INR PPP: 1.3 (ref 0.86–1.14)

## 2020-09-28 PROCEDURE — 85610 PROTHROMBIN TIME: CPT | Performed by: INTERNAL MEDICINE

## 2020-09-28 PROCEDURE — 36416 COLLJ CAPILLARY BLOOD SPEC: CPT | Performed by: INTERNAL MEDICINE

## 2020-09-28 PROCEDURE — 99207 ZZC NO CHARGE NURSE ONLY: CPT

## 2020-09-28 NOTE — PROGRESS NOTES
ANTICOAGULATION FOLLOW-UP     Patient Name:  Todd S Aschoff  Date:  2020  Contact Type:  Telephone    SUBJECTIVE:  Patient Findings     Positives:   Missed doses, Change in medications    Comments:   Had spinal injection on 2020 at [facility].  INR was checked at their facility on 2020.  He had not contacted [facility] with procedure date.    Reviewed previous warfarin dosing with patient.  He held warfarin for 5 days prior to and day of procedure.  On lovenox every 12 hours.  Anticoagulation calendar updated.    The patient was assessed for   bruising or bleeding,   with no problem findings.    INR is subtherapeutic today.   Patient will take 7.5 mg warfarin daily and continue lovenox q 12 hrs.   Follow up in 4 days.             Clinical Outcomes     Comments:   Had spinal injection on 2020 at [facility].  INR was checked at their facility on 2020.  He had not contacted [facility] with procedure date.    Reviewed previous warfarin dosing with patient.  He held warfarin for 5 days prior to and day of procedure.  On lovenox every 12 hours.  Anticoagulation calendar updated.    The patient was assessed for   bruising or bleeding,   with no problem findings.    INR is subtherapeutic today.   Patient will take 7.5 mg warfarin daily and continue lovenox q 12 hrs.   Follow up in 4 days.                OBJECTIVE    Recent labs: (last 7 days)     20  1252   INR 1.30*       ASSESSMENT / PLAN  INR assessment SUB    Recheck INR In: 4 DAYS    INR Location Clinic lab     Anticoagulation Summary  As of 2020    INR goal:   2.5-3.5   TTR:   61.0 % (1 y)   INR used for dosin.30! (2020)   Warfarin maintenance plan:   2.5 mg (5 mg x 0.5) every Wed; 5 mg (5 mg x 1) all other days   Full warfarin instructions:   : 7.5 mg; : 7.5 mg; : 7.5 mg; 10/1: 7.5 mg; Otherwise 2.5 mg every Wed; 5 mg all other days   Weekly warfarin total:   32.5 mg   Plan  last modified:   Tristan Gamboa RN (8/26/2020)   Next INR check:   10/2/2020   Priority:   High   Target end date:   Indefinite    Indications    Aortic valve prosthesis present [Z95.2]  Atrial fibrillation (H) [I48.91]  Long term current use of anticoagulant therapy [Z79.01]  Chronic atrial fibrillation (H) [I48.20]             Anticoagulation Episode Summary     INR check location:       Preferred lab:       Send INR reminders to:   AIDA GUERRERO    Comments:   5mg tabs / CALENDAR / needs bridging      Anticoagulation Care Providers     Provider Role Specialty Phone number    Obdulio Ingarm MD Referring Internal Medicine [phone]            See the Encounter Report to view Anticoagulation Flowsheet and Dosing Calendar (Go to Encounters tab in chart review, and find the Anticoagulation Therapy Visit)    INR is subtherapeutic today.   Patient will take 7.5 mg warfarin daily and continue lovenox q 12 hrs.   Follow up in 4 days.     Dosage adjustment made based on physician directed care plan.      Nova John RN

## 2020-09-29 NOTE — TELEPHONE ENCOUNTER
"Requested Prescriptions   Pending Prescriptions Disp Refills     MAPAP ARTHRITIS PAIN 650 MG CR tablet [Pharmacy Med Name: Mapap Arthritis Pain Oral Tablet Extended Release 650 MG] 180 tablet 0     Sig: Take 1 tablet (650 mg) by mouth 2 times daily       Analgesics (Non-Narcotic Tylenol and ASA Only) Passed - 9/28/2020 11:07 AM        Passed - Recent (12 mo) or future (30 days) visit within the authorizing provider's specialty     Patient has had an office visit with the authorizing provider or a provider within the authorizing providers department within the previous 12 mos or has a future within next 30 days. See \"Patient Info\" tab in inbasket, or \"Choose Columns\" in Meds & Orders section of the refill encounter.              Passed - Patient is 7 months old or older     If patient is a peds patient of the age 7 mos -12 years, ok to refill using weight-based dosing.     If >3g daily and/or sig is not \"prn\", check for liver enzymes. If normal in the last year, ok to refill.  If not, refer to the provider.          Passed - Medication is active on med list           diazepam (VALIUM) 5 MG tablet [Pharmacy Med Name: diazePAM Oral Tablet 5 MG] 270 tablet 0     Sig: TAKE ONE TABLET BY MOUTH EVERY EIGHT HOURS AS NEEDED FOR ANXIETY OR MUSCLE SPASM       There is no refill protocol information for this order          "

## 2020-09-29 NOTE — TELEPHONE ENCOUNTER
Controlled Substance Refill Request for Diazepam  Problem List Complete:  No     PROVIDER TO CONSIDER COMPLETION OF PROBLEM LIST AND OVERVIEW/CONTROLLED SUBSTANCE AGREEMENT    Last Written Prescription Date:  5/28/20  Last Fill Quantity: 270,   # refills: 0    Last Office Visit with [facility] primary care provider: 5/29/20    Future Office visit:     Controlled substance agreement:   Encounter-Level CSA - 04/28/2017:    Controlled Substance Agreement - Scan on 5/2/2017  4:41 PM: CONTROLLED SUBSTANCE AGREEMENT     Patient-Level CSA:    Controlled Substance Agreement - Non - Opioid - Scan on 11/6/2019  8:10 AM: NON-OPIOID CONTROLLED SUBSTANCE AGREEMENT         Last Urine Drug Screen: No results found for: CDAUT, No results found for: COMDAT, No results found for: THC13, PCP13, COC13, MAMP13, OPI13, AMP13, BZO13, TCA13, MTD13, BAR13, OXY13, PPX13, BUP13     [place] reviewed:  reviewed- no concerns  [place]:  [email or web address]

## 2020-10-02 ENCOUNTER — ANTICOAGULATION THERAPY VISIT (OUTPATIENT)
Dept: INTERNAL MEDICINE | Facility: CLINIC | Age: 65
End: 2020-10-02
Payer: COMMERCIAL

## 2020-10-02 DIAGNOSIS — Z79.01 LONG TERM CURRENT USE OF ANTICOAGULANT THERAPY: ICD-10-CM

## 2020-10-02 DIAGNOSIS — I48.91 ATRIAL FIBRILLATION (H): ICD-10-CM

## 2020-10-02 DIAGNOSIS — I48.20 CHRONIC ATRIAL FIBRILLATION (H): ICD-10-CM

## 2020-10-02 DIAGNOSIS — Z95.2 AORTIC VALVE PROSTHESIS PRESENT: ICD-10-CM

## 2020-10-02 LAB
CAPILLARY BLOOD COLLECTION: NORMAL
INR PPP: 2.2 (ref 0.86–1.14)

## 2020-10-02 PROCEDURE — 36416 COLLJ CAPILLARY BLOOD SPEC: CPT | Performed by: INTERNAL MEDICINE

## 2020-10-02 PROCEDURE — 85610 PROTHROMBIN TIME: CPT | Performed by: INTERNAL MEDICINE

## 2020-10-02 PROCEDURE — 99207 PR NO CHARGE NURSE ONLY: CPT | Performed by: INTERNAL MEDICINE

## 2020-10-02 RX ORDER — ACETAMINOPHEN 650 MG/1
TABLET, FILM COATED, EXTENDED RELEASE ORAL
Qty: 180 TABLET | Refills: 0 | Status: ON HOLD | OUTPATIENT
Start: 2020-10-02 | End: 2021-01-11

## 2020-10-02 RX ORDER — DIAZEPAM 5 MG
TABLET ORAL
Qty: 270 TABLET | Refills: 0 | Status: ON HOLD | OUTPATIENT
Start: 2020-10-02 | End: 2020-12-02

## 2020-10-02 NOTE — TELEPHONE ENCOUNTER
Needs to schedule an encounter with me in November or December--needs every six month encounters due to taking controlled substances (diazepam).   Please advise pt.

## 2020-10-02 NOTE — PROGRESS NOTES
ANTICOAGULATION FOLLOW-UP CLINIC VISIT    Patient Name:  Todd S Aschoff  Date:  10/2/2020  Contact Type:  Telephone/ DVM left and mychart sent    SUBJECTIVE:  Patient Findings     Comments:  Detailed VM left to continue lovenox (call [facility] if will run out before next INR) and take usual dose of warfarin. Prior [facility] note indicates patient is bridging following a spinal injection. He has a 2.5-3.5 goal range so needs to continue bridge. Recheck INR on 10-5-20. Will send mychart also.        Clinical Outcomes     Comments:  Detailed VM left to continue lovenox (call [facility] if will run out before next INR) and take usual dose of warfarin. Prior [facility] note indicates patient is bridging following a spinal injection. He has a 2.5-3.5 goal range so needs to continue bridge. Recheck INR on 10-5-20. Will send mychart also.           OBJECTIVE    Recent labs: (last 7 days)     10/02/20  1158   INR 2.20*       ASSESSMENT / PLAN  INR assessment SUB    Recheck INR In: 3 DAYS    INR Location Clinic      Anticoagulation Summary  As of 10/2/2020    INR goal:  2.5-3.5   TTR:  59.9 % (1 y)   INR used for dosin.20 (10/2/2020)   Warfarin maintenance plan:  2.5 mg (5 mg x 0.5) every Wed; 5 mg (5 mg x 1) all other days   Full warfarin instructions:  2.5 mg every Wed; 5 mg all other days   Weekly warfarin total:  32.5 mg   No change documented:  Pam Landeros RN   Plan last modified:  Tristan Gamboa RN (2020)   Next INR check:  10/5/2020   Priority:  Critical   Target end date:  Indefinite    Indications    Aortic valve prosthesis present [Z95.2]  Atrial fibrillation (H) [I48.91]  Long term current use of anticoagulant therapy [Z79.01]  Chronic atrial fibrillation (H) [I48.20]             Anticoagulation Episode Summary     INR check location:      Preferred lab:      Send INR reminders to:  AIDA GUERRERO    Comments:  5mg tabs / CALENDAR / needs bridging. may leave DVM or speak with Paul per signed consent       Anticoagulation Care Providers     Provider Role Specialty Phone number    Obdulio Ingram MD Referring Internal Medicine [phone]            See the Encounter Report to view Anticoagulation Flowsheet and Dosing Calendar (Go to Encounters tab in chart review, and find the Anticoagulation Therapy Visit)        Pam Landeros RN

## 2020-10-05 ENCOUNTER — ANTICOAGULATION THERAPY VISIT (OUTPATIENT)
Dept: NURSING | Facility: CLINIC | Age: 65
End: 2020-10-05
Payer: COMMERCIAL

## 2020-10-05 DIAGNOSIS — Z95.2 AORTIC VALVE PROSTHESIS PRESENT: ICD-10-CM

## 2020-10-05 DIAGNOSIS — I48.91 ATRIAL FIBRILLATION (H): ICD-10-CM

## 2020-10-05 DIAGNOSIS — Z79.01 LONG TERM CURRENT USE OF ANTICOAGULANT THERAPY: ICD-10-CM

## 2020-10-05 DIAGNOSIS — I48.20 CHRONIC ATRIAL FIBRILLATION (H): ICD-10-CM

## 2020-10-05 LAB
CAPILLARY BLOOD COLLECTION: NORMAL
INR PPP: 3.4 (ref 0.86–1.14)

## 2020-10-05 PROCEDURE — 99207 PR NO CHARGE NURSE ONLY: CPT

## 2020-10-05 PROCEDURE — 36416 COLLJ CAPILLARY BLOOD SPEC: CPT | Performed by: INTERNAL MEDICINE

## 2020-10-05 PROCEDURE — 85610 PROTHROMBIN TIME: CPT | Performed by: INTERNAL MEDICINE

## 2020-10-05 NOTE — PROGRESS NOTES
Pt left VM inquiring about this INR.  Reports not getting an call or message about it.    Please call pt to clarify warfarin/lovenox instructions as soon as able.

## 2020-10-05 NOTE — PROGRESS NOTES
Called patient back.  He said he called earlier and spoke to someone who told him there was no INR or warfarin dosing information in his chart.  Read him the instructions from Friday.  He will come in at 3:30pm today for INR at lab.    Nova John RN  [facility]

## 2020-10-05 NOTE — PROGRESS NOTES
ANTICOAGULATION FOLLOW-UP     Patient Name:  Todd S Aschoff  Date:  10/5/2020  Contact Type:  Telephone    SUBJECTIVE:  Patient Findings     Positives:  Extra doses (He took 7.5 mg daily for the past 3 days instead of 5 mg daily.)    Comments:  Patient called earlier today and reported that he didn't get a call or message with warfarin instructions on Friday.  Called him and arranged for him to come in today.    The patient was assessed for   diet, medication,   bruising or bleeding,   with no problem findings.    INR is therapeutic today at 3.4.   With additional doses, INR is likely to continue to rise.  Patient will return to maintenance dose.   Stop lovenox.  Follow up in 8 days.            Clinical Outcomes     Comments:  Patient called earlier today and reported that he didn't get a call or message with warfarin instructions on Friday.  Called him and arranged for him to come in today.    The patient was assessed for   diet, medication,   bruising or bleeding,   with no problem findings.    INR is therapeutic today at 3.4.   With additional doses, INR is likely to continue to rise.  Patient will return to maintenance dose.   Stop lovenox.  Follow up in 8 days.               OBJECTIVE    Recent labs: (last 7 days)     10/05/20  1530   INR 3.40*       ASSESSMENT / PLAN  INR assessment THER    Recheck INR In: 8 DAYS    INR Location Clinic lab     Anticoagulation Summary  As of 10/5/2020    INR goal:  2.5-3.5   TTR:  59.7 % (1 y)   INR used for dosing:  3.40 (10/5/2020)   Warfarin maintenance plan:  2.5 mg (5 mg x 0.5) every Wed; 5 mg (5 mg x 1) all other days   Full warfarin instructions:  2.5 mg every Wed; 5 mg all other days   Weekly warfarin total:  32.5 mg   No change documented:  Nova John, RN   Plan last modified:  Tristan Gamboa RN (8/26/2020)   Next INR check:  10/13/2020   Priority:  Maintenance   Target end date:  Indefinite    Indications    Aortic valve prosthesis present  [Z95.2]  Atrial fibrillation (H) [I48.91]  Long term current use of anticoagulant therapy [Z79.01]  Chronic atrial fibrillation (H) [I48.20]             Anticoagulation Episode Summary     INR check location:      Preferred lab:      Send INR reminders to:  AIDA GUERRERO    Comments:  5mg tabs / CALENDAR / needs bridging. may leave DVM or speak with Paul, per signed consent      Anticoagulation Care Providers     Provider Role Specialty Phone number    Obdulio Ingram MD Referring Internal Medicine [phone]            See the Encounter Report to view Anticoagulation Flowsheet and Dosing Calendar (Go to Encounters tab in chart review, and find the Anticoagulation Therapy Visit)    INR is therapeutic today at 3.4.   With additional doses, INR is likely to continue to rise.  Patient will return to maintenance dose.   Stop lovenox.  Follow up in 8 days.        Nova John RN

## 2020-10-09 ENCOUNTER — TRANSFERRED RECORDS (OUTPATIENT)
Dept: HEALTH INFORMATION MANAGEMENT | Facility: CLINIC | Age: 65
End: 2020-10-09

## 2020-10-14 ENCOUNTER — ANTICOAGULATION THERAPY VISIT (OUTPATIENT)
Dept: ANTICOAGULATION | Facility: CLINIC | Age: 65
End: 2020-10-14

## 2020-10-14 ENCOUNTER — TRANSFERRED RECORDS (OUTPATIENT)
Dept: HEALTH INFORMATION MANAGEMENT | Facility: CLINIC | Age: 65
End: 2020-10-14

## 2020-10-14 DIAGNOSIS — I48.91 ATRIAL FIBRILLATION (H): ICD-10-CM

## 2020-10-14 DIAGNOSIS — Z79.01 LONG TERM CURRENT USE OF ANTICOAGULANT THERAPY: ICD-10-CM

## 2020-10-14 DIAGNOSIS — I48.20 CHRONIC ATRIAL FIBRILLATION (H): ICD-10-CM

## 2020-10-14 DIAGNOSIS — Z95.2 AORTIC VALVE PROSTHESIS PRESENT: ICD-10-CM

## 2020-10-14 LAB
CAPILLARY BLOOD COLLECTION: NORMAL
INR PPP: 2.2 (ref 0.86–1.14)

## 2020-10-14 PROCEDURE — 36416 COLLJ CAPILLARY BLOOD SPEC: CPT | Performed by: INTERNAL MEDICINE

## 2020-10-14 PROCEDURE — 99207 PR NO CHARGE NURSE ONLY: CPT | Performed by: INTERNAL MEDICINE

## 2020-10-14 PROCEDURE — 85610 PROTHROMBIN TIME: CPT | Performed by: INTERNAL MEDICINE

## 2020-10-14 NOTE — PROGRESS NOTES
ANTICOAGULATION FOLLOW-UP CLINIC VISIT    Patient Name:  Todd S Aschoff  Date:  10/14/2020  Contact Type:  Telephone/ discussed with patient    SUBJECTIVE:  Patient Findings     Comments:  The patient was assessed for diet, medication, and activity level changes, missed or extra doses, bruising or bleeding, with no problem findings.  INR has been dropping below range when boost dose is not given.  Maintenance dose increased by 15% today.  Patient denies any identifiable changes that caused the sub therapeutic INR.           Clinical Outcomes     Negatives:  Major bleeding event, Thromboembolic event, Anticoagulation-related hospital admission, Anticoagulation-related ED visit, Anticoagulation-related fatality    Comments:  The patient was assessed for diet, medication, and activity level changes, missed or extra doses, bruising or bleeding, with no problem findings.  INR has been dropping below range when boost dose is not given.  Maintenance dose increased by 15% today.  Patient denies any identifiable changes that caused the sub therapeutic INR.              OBJECTIVE    Recent labs: (last 7 days)     10/14/20  0804   INR 2.20*       ASSESSMENT / PLAN  INR assessment THER    Recheck INR In: 10 DAYS    INR Location Clinic      Anticoagulation Summary  As of 10/14/2020    INR goal:  2.5-3.5   TTR:  59.1 % (1 y)   INR used for dosin.20 (10/14/2020)   Warfarin maintenance plan:  7.5 mg (5 mg x 1.5) every Wed; 5 mg (5 mg x 1) all other days   Full warfarin instructions:  7.5 mg every Wed; 5 mg all other days   Weekly warfarin total:  37.5 mg   Plan last modified:  Amanda Wheeler RN (10/14/2020)   Next INR check:  10/23/2020   Priority:  Maintenance   Target end date:  Indefinite    Indications    Aortic valve prosthesis present [Z95.2]  Atrial fibrillation (H) [I48.91]  Long term current use of anticoagulant therapy [Z79.01]  Chronic atrial fibrillation (H) [I48.20]             Anticoagulation Episode Summary      INR check location:      Preferred lab:      Send INR reminders to:  AIDA GUERRERO    Comments:  5mg tabs / CALENDAR / needs bridging. may leave DVM or speak with Paul, per signed consent      Anticoagulation Care Providers     Provider Role Specialty Phone number    Obdulio Ingram MD Referring Internal Medicine [phone]            See the Encounter Report to view Anticoagulation Flowsheet and Dosing Calendar (Go to Encounters tab in chart review, and find the Anticoagulation Therapy Visit)    Dosage adjustment made based on physician directed care plan.    Amanda Wheeler RN

## 2020-10-23 ENCOUNTER — ANTICOAGULATION THERAPY VISIT (OUTPATIENT)
Dept: INTERNAL MEDICINE | Facility: CLINIC | Age: 65
End: 2020-10-23

## 2020-10-23 DIAGNOSIS — I48.91 ATRIAL FIBRILLATION (H): ICD-10-CM

## 2020-10-23 DIAGNOSIS — I48.20 CHRONIC ATRIAL FIBRILLATION (H): ICD-10-CM

## 2020-10-23 DIAGNOSIS — Z79.01 LONG TERM CURRENT USE OF ANTICOAGULANT THERAPY: ICD-10-CM

## 2020-10-23 DIAGNOSIS — Z95.2 AORTIC VALVE PROSTHESIS PRESENT: ICD-10-CM

## 2020-10-23 LAB
CAPILLARY BLOOD COLLECTION: NORMAL
INR PPP: 2.3 (ref 0.86–1.14)

## 2020-10-23 PROCEDURE — 85610 PROTHROMBIN TIME: CPT | Performed by: INTERNAL MEDICINE

## 2020-10-23 PROCEDURE — 36416 COLLJ CAPILLARY BLOOD SPEC: CPT | Performed by: INTERNAL MEDICINE

## 2020-10-23 PROCEDURE — 99207 PR NO CHARGE NURSE ONLY: CPT | Performed by: INTERNAL MEDICINE

## 2020-10-23 NOTE — PROGRESS NOTES
Anticoagulation Management    Unable to reach Nicko today.    Today's INR result of 2.30 is subtherapeutic (goal INR of 2.5-3.5).  Result received from: Clinic Lab    Follow up required to confirm warfarin dose taken and assess for changes    Called patient x 2, dosing instructions shared in the second voicemail. Requested a call back before 5:45PM today or on Monday.     Tentative plan (shared with patient on voicemail): Give 10mg (booster dose) today, then begin 7.5mg Sun/Thurs; 5mg all other days (~6.7% increase) back to his previous stable maintenance dose from 12/2018-7/2020. Recheck the INR again within 10 days.    Anticoagulation clinic to follow up    Jeana Koehler RN CACP

## 2020-10-26 NOTE — PROGRESS NOTES
ANTICOAGULATION FOLLOW-UP    Patient Name:  Todd S Aschoff  Date:  10/26/2020  Contact Type:  Telephone    SUBJECTIVE:  Patient Findings     Comments:  2020   9:53am  The patient was assessed for   diet, medication,   missed or extra doses,   bruising or bleeding,   with no problem findings.  No questions or concerns.  Reviewed previous warfarin dosing with patient.  He got the message that was left on Friday.  He took warfarin as instructed.  10 mg Friday, then 7.5 mg Sun, Thurs. 5 mg all other days.  Recheck INR on 2020. Appointment scheduled.          Clinical Outcomes     Comments:  2020   9:53am  The patient was assessed for   diet, medication,   missed or extra doses,   bruising or bleeding,   with no problem findings.  No questions or concerns.  Reviewed previous warfarin dosing with patient.  He got the message that was left on Friday.  He took warfarin as instructed.  10 mg Friday, then 7.5 mg Sun, Thurs. 5 mg all other days.  Recheck INR on 2020. Appointment scheduled.             OBJECTIVE    Recent labs: (last 7 days)     10/23/20  1303   INR 2.30*       ASSESSMENT / PLAN  INR assessment SUB    Recheck INR In: 10 DAYS    INR Location Clinic lab     Anticoagulation Summary  As of 10/23/2020    INR goal:  2.5-3.5   TTR:  56.6 % (1 y)   INR used for dosin.30 (10/23/2020)   Warfarin maintenance plan:  7.5 mg (5 mg x 1.5) every Sun, Thu; 5 mg (5 mg x 1) all other days   Full warfarin instructions:  10/23: 10 mg; Otherwise 7.5 mg every Sun, Thu; 5 mg all other days   Weekly warfarin total:  40 mg   Plan last modified:  Jeana Koehler RN (10/23/2020)   Next INR check:  2020   Priority:  Maintenance   Target end date:  Indefinite    Indications    Aortic valve prosthesis present [Z95.2]  Atrial fibrillation (H) [I48.91]  Long term current use of anticoagulant therapy [Z79.01]  Chronic atrial fibrillation (H) [I48.20]             Anticoagulation Episode Summary      INR check location:      Preferred lab:      Send INR reminders to:  AIDA GUERRERO    Comments:  5mg tabs / CALENDAR / needs bridging. may leave DVM or speak with Paul, per signed consent      Anticoagulation Care Providers     Provider Role Specialty Phone number    Obdulio Ingram MD Referring Internal Medicine [phone]            See the Encounter Report to view Anticoagulation Flowsheet and Dosing Calendar (Go to Encounters tab in chart review, and find the Anticoagulation Therapy Visit)        Nova John RN

## 2020-10-28 DIAGNOSIS — I48.20 CHRONIC ATRIAL FIBRILLATION (H): ICD-10-CM

## 2020-10-29 NOTE — TELEPHONE ENCOUNTER
propafenone      Last Written Prescription Date:  10/29/19  Last Fill Quantity: 270,   # refills: 3  Last Office Visit: 5/29/20  Future Office visit:    Next 5 appointments (look out 90 days)    Dec 04, 2020 12:40 PM  SHORT with Obdulio Ingram MD  [facility] ([facility]) [address]  [facility] [address]  [phone]           Routing refill request to provider for review/approval because:  Drug not on the [facility], [facility] or [facility] refill protocol or controlled substance

## 2020-10-30 RX ORDER — PROPAFENONE HYDROCHLORIDE 150 MG/1
150 TABLET, COATED ORAL EVERY 8 HOURS
Qty: 270 TABLET | Refills: 0 | Status: SHIPPED | OUTPATIENT
Start: 2020-10-30 | End: 2020-11-24

## 2020-11-02 ENCOUNTER — ANTICOAGULATION THERAPY VISIT (OUTPATIENT)
Dept: NURSING | Facility: CLINIC | Age: 65
End: 2020-11-02

## 2020-11-02 DIAGNOSIS — I48.91 ATRIAL FIBRILLATION (H): ICD-10-CM

## 2020-11-02 DIAGNOSIS — I48.20 CHRONIC ATRIAL FIBRILLATION (H): ICD-10-CM

## 2020-11-02 DIAGNOSIS — Z79.01 LONG TERM CURRENT USE OF ANTICOAGULANT THERAPY: ICD-10-CM

## 2020-11-02 DIAGNOSIS — Z95.2 AORTIC VALVE PROSTHESIS PRESENT: ICD-10-CM

## 2020-11-02 LAB
CAPILLARY BLOOD COLLECTION: NORMAL
INR PPP: 3.6 (ref 0.86–1.14)

## 2020-11-02 PROCEDURE — 36416 COLLJ CAPILLARY BLOOD SPEC: CPT | Performed by: INTERNAL MEDICINE

## 2020-11-02 PROCEDURE — 85610 PROTHROMBIN TIME: CPT | Performed by: INTERNAL MEDICINE

## 2020-11-02 PROCEDURE — 99207 PR NO CHARGE NURSE ONLY: CPT

## 2020-11-02 NOTE — PROGRESS NOTES
Anticoagulation Management    Unable to reach Nicko today.    Today's INR result of 3.6 is supratherapeutic (goal INR of 2.5-3.5).  Result received from: Clinic Lab    Follow up required to:   Confirm warfarin dose taken and assess for changes  Discuss out of range INR   Discuss dosing instructions and confirm understanding of instructions  Schedule next INR appointment    Left message to continue current dose of warfarin 5 mg tonight.  Left message to call back.      Anticoagulation clinic to follow up    [facility]

## 2020-11-03 NOTE — PROGRESS NOTES
ANTICOAGULATION FOLLOW-UP CLINIC VISIT    Patient Name:  Todd S Aschoff  Date:  11/3/2020  Contact Type:  Telephone--spoke to pt    SUBJECTIVE:  Patient Findings     Positives:  Change in health (Pain in his back is chronic but has improved slightly with recent epidural injection (10/2020))    Comments:  Patient denies any identifiable changes that caused the supra-therapeutic INR.   Patient will continue current warfarin maintenance dose and eat a serving of greens today, agrees to follow up in 2 weeks for next INR.        Clinical Outcomes     Negatives:  Major bleeding event, Thromboembolic event, Anticoagulation-related hospital admission, Anticoagulation-related ED visit, Anticoagulation-related fatality    Comments:  Patient denies any identifiable changes that caused the supra-therapeutic INR.   Patient will continue current warfarin maintenance dose and eat a serving of greens today, agrees to follow up in 2 weeks for next INR.           OBJECTIVE    Recent labs: (last 7 days)     11/02/20  1459   INR 3.60*       ASSESSMENT / PLAN  INR assessment SUPRA     Recheck INR In: 2 WEEKS    INR Location Clinic       Anticoagulation Summary  As of 11/2/2020    INR goal:  2.5-3.5   TTR:  55.8 % (1 y)   INR used for dosing:  3.60 (11/2/2020)   Warfarin maintenance plan:  7.5 mg (5 mg x 1.5) every Sun, Thu; 5 mg (5 mg x 1) all other days   Full warfarin instructions:  7.5 mg every Sun, Thu; 5 mg all other days   Weekly warfarin total:  40 mg   No change documented:  Angely Smalls RN   Plan last modified:  Jeana Koehler RN (10/23/2020)   Next INR check:  11/16/2020   Priority:  High   Target end date:  Indefinite    Indications    Aortic valve prosthesis present [Z95.2]  Atrial fibrillation (H) [I48.91]  Long term current use of anticoagulant therapy [Z79.01]  Chronic atrial fibrillation (H) [I48.20]             Anticoagulation Episode Summary     INR check location:      Preferred lab:      Send INR reminders  to:  AIAD GUERRERO    Comments:  5mg tabs / CALENDAR / needs bridging. may leave DVM or speak with Paul, per signed consent      Anticoagulation Care Providers     Provider Role Specialty Phone number    Obdulio Ingram MD Referring Internal Medicine [phone]            See the Encounter Report to view Anticoagulation Flowsheet and Dosing Calendar (Go to Encounters tab in chart review, and find the Anticoagulation Therapy Visit)        Angely Smalls RN

## 2020-11-16 ENCOUNTER — ANTICOAGULATION THERAPY VISIT (OUTPATIENT)
Dept: NURSING | Facility: CLINIC | Age: 65
End: 2020-11-16

## 2020-11-16 DIAGNOSIS — Z95.2 AORTIC VALVE PROSTHESIS PRESENT: ICD-10-CM

## 2020-11-16 DIAGNOSIS — F33.42 MAJOR DEPRESSIVE DISORDER, RECURRENT EPISODE, IN FULL REMISSION (H): ICD-10-CM

## 2020-11-16 DIAGNOSIS — I48.91 ATRIAL FIBRILLATION (H): ICD-10-CM

## 2020-11-16 DIAGNOSIS — I48.20 CHRONIC ATRIAL FIBRILLATION (H): ICD-10-CM

## 2020-11-16 DIAGNOSIS — Z79.01 CURRENT USE OF LONG TERM ANTICOAGULATION: ICD-10-CM

## 2020-11-16 DIAGNOSIS — Z79.01 LONG TERM CURRENT USE OF ANTICOAGULANT THERAPY: ICD-10-CM

## 2020-11-16 LAB
CAPILLARY BLOOD COLLECTION: NORMAL
INR PPP: 3.8 (ref 0.86–1.14)

## 2020-11-16 PROCEDURE — 36416 COLLJ CAPILLARY BLOOD SPEC: CPT | Performed by: INTERNAL MEDICINE

## 2020-11-16 PROCEDURE — 85610 PROTHROMBIN TIME: CPT | Performed by: INTERNAL MEDICINE

## 2020-11-16 PROCEDURE — 99207 PR NO CHARGE NURSE ONLY: CPT

## 2020-11-16 RX ORDER — WARFARIN SODIUM 5 MG/1
TABLET ORAL
Qty: 100 TABLET | Refills: 1 | Status: ON HOLD | OUTPATIENT
Start: 2020-11-16 | End: 2020-12-02

## 2020-11-16 NOTE — PROGRESS NOTES
ANTICOAGULATION FOLLOW-UP     Patient Name:  Todd S Aschoff  Date:  11/16/2020  Contact Type:  Telephone    SUBJECTIVE:  Patient Findings     Comments:  Reviewed previous warfarin dosing with patient.  He took warfarin as instructed.  Patient denies: extra doses, illness, inflammation,   bleeding/bruises, medication changes, diet changes,  or any alcohol intake.    INR is supratherapeutic today.   Patient will decrease weekly maintenance dose total by 6.3%.  Will follow up in 2 weeks.        Clinical Outcomes     Comments:  Reviewed previous warfarin dosing with patient.  He took warfarin as instructed.  Patient denies: extra doses, illness, inflammation,   bleeding/bruises, medication changes, diet changes,  or any alcohol intake.    INR is supratherapeutic today.   Patient will decrease weekly maintenance dose total by 6.3%.  Will follow up in 2 weeks.           OBJECTIVE    Recent labs: (last 7 days)     11/16/20  1141   INR 3.80*       ASSESSMENT / PLAN  INR assessment SUPRA    Recheck INR In: 2 WEEKS    INR Location Clinic lab     Anticoagulation Summary  As of 11/16/2020    INR goal:  2.5-3.5   TTR:  55.3 % (1 y)   INR used for dosing:  3.80 (11/16/2020)   Warfarin maintenance plan:  7.5 mg (5 mg x 1.5) every Sun; 5 mg (5 mg x 1) all other days   Full warfarin instructions:  7.5 mg every Sun; 5 mg all other days   Weekly warfarin total:  37.5 mg   Plan last modified:  Nova John RN (11/16/2020)   Next INR check:  11/30/2020   Priority:  High   Target end date:  Indefinite    Indications    Aortic valve prosthesis present [Z95.2]  Atrial fibrillation (H) [I48.91]  Long term current use of anticoagulant therapy [Z79.01]  Chronic atrial fibrillation (H) [I48.20]             Anticoagulation Episode Summary     INR check location:      Preferred lab:      Send INR reminders to:  AIDA GUERRERO    Comments:  5mg tabs / CALENDAR / needs bridging. may leave DVM or speak with rey Miller signed consent       Anticoagulation Care Providers     Provider Role Specialty Phone number    Obdulio Ingram MD Referring Internal Medicine [phone]            See the Encounter Report to view Anticoagulation Flowsheet and Dosing Calendar (Go to Encounters tab in chart review, and find the Anticoagulation Therapy Visit)    INR is supratherapeutic today.   Patient will decrease weekly maintenance dose total by 6.3%.  Will follow up in 2 weeks.    Dosage adjustment made based on physician directed care plan.        Nova John RN

## 2020-11-18 NOTE — TELEPHONE ENCOUNTER
Pending Prescriptions:                       Disp   Refills    venlafaxine (EFFEXOR-XR) 150 MG 24 hr caps*90 cap*0        Sig: TAKE ONE CAPSULE BY MOUTH DAILY WITH BREAKFAST      Routing refill request to provider for review/approval because:  Protocol failed.    Please advise, thanks.

## 2020-11-20 RX ORDER — VENLAFAXINE HYDROCHLORIDE 150 MG/1
CAPSULE, EXTENDED RELEASE ORAL
Qty: 90 CAPSULE | Refills: 0 | Status: ON HOLD | OUTPATIENT
Start: 2020-11-20 | End: 2020-12-02

## 2020-11-24 DIAGNOSIS — I48.20 CHRONIC ATRIAL FIBRILLATION (H): ICD-10-CM

## 2020-11-24 DIAGNOSIS — E03.9 ACQUIRED HYPOTHYROIDISM: ICD-10-CM

## 2020-11-24 DIAGNOSIS — Z79.01 LONG TERM CURRENT USE OF ANTICOAGULANT THERAPY: Primary | ICD-10-CM

## 2020-11-24 DIAGNOSIS — Z79.899 MEDICATION MANAGEMENT: ICD-10-CM

## 2020-11-24 DIAGNOSIS — E78.5 HYPERLIPIDEMIA LDL GOAL <130: ICD-10-CM

## 2020-11-24 RX ORDER — PROPAFENONE HYDROCHLORIDE 150 MG/1
150 TABLET, COATED ORAL EVERY 8 HOURS
Qty: 270 TABLET | Refills: 0 | Status: SHIPPED | OUTPATIENT
Start: 2020-11-24 | End: 2020-12-10

## 2020-11-24 NOTE — TELEPHONE ENCOUNTER
Routing refill request to provider for review/approval because:  Drug not on the [facility] refill protocol     Routed to covering providers to review.

## 2020-11-24 NOTE — TELEPHONE ENCOUNTER
Patient has been out of medication for 1 week due to error by the pharmacy. Please send RX as soon as possible.

## 2020-11-27 ENCOUNTER — HOSPITAL ENCOUNTER (INPATIENT)
Facility: CLINIC | Age: 65
LOS: 7 days | Discharge: HOME | End: 2020-12-04
Attending: INTERNAL MEDICINE | Admitting: PSYCHIATRY & NEUROLOGY
Payer: COMMERCIAL

## 2020-11-27 DIAGNOSIS — F33.2 SEVERE RECURRENT MAJOR DEPRESSION WITHOUT PSYCHOTIC FEATURES (H): ICD-10-CM

## 2020-11-27 DIAGNOSIS — T50.912A SUICIDE ATTEMPT BY MULTIPLE DRUG OVERDOSE, INITIAL ENCOUNTER (H): ICD-10-CM

## 2020-11-27 DIAGNOSIS — F33.42 MAJOR DEPRESSIVE DISORDER, RECURRENT EPISODE, IN FULL REMISSION (H): ICD-10-CM

## 2020-11-27 DIAGNOSIS — F45.42 PAIN DISORDER WITH RELATED PSYCHOLOGICAL FACTORS (CODE): ICD-10-CM

## 2020-11-27 DIAGNOSIS — R41.3 MEMORY DIFFICULTIES: ICD-10-CM

## 2020-11-27 DIAGNOSIS — M54.6 CHRONIC BILATERAL THORACIC BACK PAIN: ICD-10-CM

## 2020-11-27 DIAGNOSIS — G31.84 MILD COGNITIVE IMPAIRMENT: Primary | ICD-10-CM

## 2020-11-27 DIAGNOSIS — F32.A DEPRESSION, UNSPECIFIED DEPRESSION TYPE: ICD-10-CM

## 2020-11-27 DIAGNOSIS — T42.4X2A: ICD-10-CM

## 2020-11-27 DIAGNOSIS — Z20.828 CONTACT WITH AND (SUSPECTED) EXPOSURE TO OTHER VIRAL COMMUNICABLE DISEASES: ICD-10-CM

## 2020-11-27 DIAGNOSIS — T45.512A: ICD-10-CM

## 2020-11-27 DIAGNOSIS — G89.29 CHRONIC BILATERAL THORACIC BACK PAIN: ICD-10-CM

## 2020-11-27 LAB
ALBUMIN SERPL-MCNC: 3.6 G/DL (ref 3.4–5)
ALBUMIN UR-MCNC: 10 MG/DL
ALP SERPL-CCNC: 85 U/L (ref 40–150)
ALT SERPL W P-5'-P-CCNC: 39 U/L (ref 0–70)
AMPHETAMINES UR QL SCN: NEGATIVE
ANION GAP SERPL CALCULATED.3IONS-SCNC: 4 MMOL/L (ref 3–14)
APAP SERPL-MCNC: <2 MG/L (ref 10–20)
APPEARANCE UR: CLEAR
AST SERPL W P-5'-P-CCNC: 28 U/L (ref 0–45)
BARBITURATES UR QL: NEGATIVE
BASOPHILS # BLD AUTO: 0 10E9/L (ref 0–0.2)
BASOPHILS NFR BLD AUTO: 0.6 %
BENZODIAZ UR QL: POSITIVE
BILIRUB SERPL-MCNC: 1.7 MG/DL (ref 0.2–1.3)
BILIRUB UR QL STRIP: NEGATIVE
BUN SERPL-MCNC: 16 MG/DL (ref 7–30)
CALCIUM SERPL-MCNC: 8.4 MG/DL (ref 8.5–10.1)
CANNABINOIDS UR QL SCN: NEGATIVE
CHLORIDE SERPL-SCNC: 105 MMOL/L (ref 94–109)
CO2 SERPL-SCNC: 30 MMOL/L (ref 20–32)
COCAINE UR QL: NEGATIVE
COLOR UR AUTO: YELLOW
CREAT SERPL-MCNC: 0.98 MG/DL (ref 0.66–1.25)
CRP SERPL-MCNC: 2.9 MG/L (ref 0–8)
DIFFERENTIAL METHOD BLD: ABNORMAL
EOSINOPHIL # BLD AUTO: 0.1 10E9/L (ref 0–0.7)
EOSINOPHIL NFR BLD AUTO: 1.5 %
ERYTHROCYTE [DISTWIDTH] IN BLOOD BY AUTOMATED COUNT: 13.1 % (ref 10–15)
ETHANOL SERPL-MCNC: <0.01 G/DL
ETHANOL UR QL SCN: NEGATIVE
GFR SERPL CREATININE-BSD FRML MDRD: 82 ML/MIN/{1.73_M2}
GLUCOSE SERPL-MCNC: 129 MG/DL (ref 70–99)
GLUCOSE UR STRIP-MCNC: NEGATIVE MG/DL
HCT VFR BLD AUTO: 51.1 % (ref 40–53)
HGB BLD-MCNC: 17.9 G/DL (ref 13.3–17.7)
HGB UR QL STRIP: ABNORMAL
IMM GRANULOCYTES # BLD: 0 10E9/L (ref 0–0.4)
IMM GRANULOCYTES NFR BLD: 0.4 %
INR PPP: 2.87 (ref 0.86–1.14)
KETONES UR STRIP-MCNC: NEGATIVE MG/DL
LEUKOCYTE ESTERASE UR QL STRIP: NEGATIVE
LYMPHOCYTES # BLD AUTO: 1.3 10E9/L (ref 0.8–5.3)
LYMPHOCYTES NFR BLD AUTO: 19 %
MCH RBC QN AUTO: 32.4 PG (ref 26.5–33)
MCHC RBC AUTO-ENTMCNC: 35 G/DL (ref 31.5–36.5)
MCV RBC AUTO: 92 FL (ref 78–100)
MONOCYTES # BLD AUTO: 0.7 10E9/L (ref 0–1.3)
MONOCYTES NFR BLD AUTO: 9.6 %
MUCOUS THREADS #/AREA URNS LPF: PRESENT /LPF
NEUTROPHILS # BLD AUTO: 4.7 10E9/L (ref 1.6–8.3)
NEUTROPHILS NFR BLD AUTO: 68.9 %
NITRATE UR QL: NEGATIVE
NRBC # BLD AUTO: 0 10*3/UL
NRBC BLD AUTO-RTO: 0 /100
OPIATES UR QL SCN: NEGATIVE
PH UR STRIP: 5.5 PH (ref 5–7)
PLATELET # BLD AUTO: 222 10E9/L (ref 150–450)
POTASSIUM SERPL-SCNC: 3.8 MMOL/L (ref 3.4–5.3)
PROT SERPL-MCNC: 7 G/DL (ref 6.8–8.8)
RBC # BLD AUTO: 5.53 10E12/L (ref 4.4–5.9)
RBC #/AREA URNS AUTO: 3 /HPF (ref 0–2)
SALICYLATES SERPL-MCNC: <2 MG/DL
SARS-COV-2 RNA SPEC QL NAA+PROBE: NORMAL
SODIUM SERPL-SCNC: 139 MMOL/L (ref 133–144)
SOURCE: ABNORMAL
SP GR UR STRIP: 1.02 (ref 1–1.03)
SPECIMEN SOURCE: NORMAL
SQUAMOUS #/AREA URNS AUTO: <1 /HPF (ref 0–1)
TROPONIN I SERPL-MCNC: <0.015 UG/L (ref 0–0.04)
UROBILINOGEN UR STRIP-MCNC: NORMAL MG/DL (ref 0–2)
WBC # BLD AUTO: 6.9 10E9/L (ref 4–11)
WBC #/AREA URNS AUTO: 1 /HPF (ref 0–5)

## 2020-11-27 PROCEDURE — 36415 COLL VENOUS BLD VENIPUNCTURE: CPT | Performed by: CLINICAL NURSE SPECIALIST

## 2020-11-27 PROCEDURE — 80307 DRUG TEST PRSMV CHEM ANLYZR: CPT | Performed by: INTERNAL MEDICINE

## 2020-11-27 PROCEDURE — 85610 PROTHROMBIN TIME: CPT | Performed by: INTERNAL MEDICINE

## 2020-11-27 PROCEDURE — 99285 EMERGENCY DEPT VISIT HI MDM: CPT | Mod: 25 | Performed by: INTERNAL MEDICINE

## 2020-11-27 PROCEDURE — U0003 INFECTIOUS AGENT DETECTION BY NUCLEIC ACID (DNA OR RNA); SEVERE ACUTE RESPIRATORY SYNDROME CORONAVIRUS 2 (SARS-COV-2) (CORONAVIRUS DISEASE [COVID-19]), AMPLIFIED PROBE TECHNIQUE, MAKING USE OF HIGH THROUGHPUT TECHNOLOGIES AS DESCRIBED BY CMS-2020-01-R: HCPCS | Performed by: INTERNAL MEDICINE

## 2020-11-27 PROCEDURE — 80320 DRUG SCREEN QUANTALCOHOLS: CPT | Performed by: INTERNAL MEDICINE

## 2020-11-27 PROCEDURE — 250N000013 HC RX MED GY IP 250 OP 250 PS 637: Performed by: INTERNAL MEDICINE

## 2020-11-27 PROCEDURE — 80329 ANALGESICS NON-OPIOID 1 OR 2: CPT | Performed by: INTERNAL MEDICINE

## 2020-11-27 PROCEDURE — 93010 ELECTROCARDIOGRAM REPORT: CPT | Performed by: INTERNAL MEDICINE

## 2020-11-27 PROCEDURE — 81001 URINALYSIS AUTO W/SCOPE: CPT | Performed by: INTERNAL MEDICINE

## 2020-11-27 PROCEDURE — 84484 ASSAY OF TROPONIN QUANT: CPT | Performed by: INTERNAL MEDICINE

## 2020-11-27 PROCEDURE — 85025 COMPLETE CBC W/AUTO DIFF WBC: CPT | Performed by: INTERNAL MEDICINE

## 2020-11-27 PROCEDURE — 85610 PROTHROMBIN TIME: CPT | Performed by: CLINICAL NURSE SPECIALIST

## 2020-11-27 PROCEDURE — C9803 HOPD COVID-19 SPEC COLLECT: HCPCS | Performed by: INTERNAL MEDICINE

## 2020-11-27 PROCEDURE — G0177 OPPS/PHP; TRAIN & EDUC SERV: HCPCS

## 2020-11-27 PROCEDURE — 90791 PSYCH DIAGNOSTIC EVALUATION: CPT

## 2020-11-27 PROCEDURE — 93005 ELECTROCARDIOGRAM TRACING: CPT | Performed by: INTERNAL MEDICINE

## 2020-11-27 PROCEDURE — 250N000013 HC RX MED GY IP 250 OP 250 PS 637: Performed by: CLINICAL NURSE SPECIALIST

## 2020-11-27 PROCEDURE — 80053 COMPREHEN METABOLIC PANEL: CPT | Performed by: INTERNAL MEDICINE

## 2020-11-27 PROCEDURE — 86140 C-REACTIVE PROTEIN: CPT | Performed by: INTERNAL MEDICINE

## 2020-11-27 PROCEDURE — 124N000003 HC R&B MH SENIOR/ADOLESCENT

## 2020-11-27 RX ORDER — SIMVASTATIN 20 MG
40 TABLET ORAL AT BEDTIME
Status: DISCONTINUED | OUTPATIENT
Start: 2020-11-27 | End: 2020-12-04 | Stop reason: HOSPADM

## 2020-11-27 RX ORDER — PROPAFENONE HYDROCHLORIDE 150 MG/1
150 TABLET, COATED ORAL EVERY 8 HOURS
Status: DISCONTINUED | OUTPATIENT
Start: 2020-11-27 | End: 2020-12-04 | Stop reason: HOSPADM

## 2020-11-27 RX ORDER — HYDROXYZINE HYDROCHLORIDE 25 MG/1
25 TABLET, FILM COATED ORAL EVERY 4 HOURS PRN
Status: DISCONTINUED | OUTPATIENT
Start: 2020-11-27 | End: 2020-12-04 | Stop reason: HOSPADM

## 2020-11-27 RX ORDER — LEVOTHYROXINE SODIUM 75 UG/1
150 TABLET ORAL DAILY
Status: DISCONTINUED | OUTPATIENT
Start: 2020-11-28 | End: 2020-12-04 | Stop reason: HOSPADM

## 2020-11-27 RX ORDER — MIRABEGRON 25 MG/1
50 TABLET, FILM COATED, EXTENDED RELEASE ORAL ONCE
Status: COMPLETED | OUTPATIENT
Start: 2020-11-27 | End: 2020-11-27

## 2020-11-27 RX ORDER — VENLAFAXINE HYDROCHLORIDE 150 MG/1
150 CAPSULE, EXTENDED RELEASE ORAL
Status: DISCONTINUED | OUTPATIENT
Start: 2020-11-28 | End: 2020-11-28

## 2020-11-27 RX ORDER — PREGABALIN 100 MG/1
100 CAPSULE ORAL 3 TIMES DAILY
Status: DISCONTINUED | OUTPATIENT
Start: 2020-11-28 | End: 2020-12-04 | Stop reason: HOSPADM

## 2020-11-27 RX ORDER — AMOXICILLIN 250 MG
1 CAPSULE ORAL 2 TIMES DAILY PRN
Status: DISCONTINUED | OUTPATIENT
Start: 2020-11-27 | End: 2020-12-04 | Stop reason: HOSPADM

## 2020-11-27 RX ORDER — ACETAMINOPHEN 325 MG/1
325 TABLET ORAL EVERY 6 HOURS
Status: DISCONTINUED | OUTPATIENT
Start: 2020-11-28 | End: 2020-12-04 | Stop reason: HOSPADM

## 2020-11-27 RX ORDER — MAGNESIUM HYDROXIDE/ALUMINUM HYDROXICE/SIMETHICONE 120; 1200; 1200 MG/30ML; MG/30ML; MG/30ML
30 SUSPENSION ORAL EVERY 4 HOURS PRN
Status: DISCONTINUED | OUTPATIENT
Start: 2020-11-27 | End: 2020-12-04 | Stop reason: HOSPADM

## 2020-11-27 RX ORDER — CYCLOBENZAPRINE HCL 5 MG
10 TABLET ORAL 3 TIMES DAILY
Status: DISCONTINUED | OUTPATIENT
Start: 2020-11-28 | End: 2020-12-04 | Stop reason: HOSPADM

## 2020-11-27 RX ORDER — AZELASTINE 1 MG/ML
1 SPRAY, METERED NASAL 2 TIMES DAILY
Status: DISCONTINUED | OUTPATIENT
Start: 2020-11-27 | End: 2020-12-04 | Stop reason: HOSPADM

## 2020-11-27 RX ORDER — DONEPEZIL HYDROCHLORIDE 10 MG/1
10 TABLET, FILM COATED ORAL AT BEDTIME
Status: DISCONTINUED | OUTPATIENT
Start: 2020-11-27 | End: 2020-12-02

## 2020-11-27 RX ORDER — LANOLIN ALCOHOL/MO/W.PET/CERES
3 CREAM (GRAM) TOPICAL
Status: DISCONTINUED | OUTPATIENT
Start: 2020-11-27 | End: 2020-12-04 | Stop reason: HOSPADM

## 2020-11-27 RX ORDER — MIRABEGRON 25 MG/1
50 TABLET, FILM COATED, EXTENDED RELEASE ORAL DAILY
Status: DISCONTINUED | OUTPATIENT
Start: 2020-11-28 | End: 2020-12-04 | Stop reason: HOSPADM

## 2020-11-27 RX ORDER — GUANFACINE 1 MG/1
1 TABLET ORAL AT BEDTIME
Status: DISCONTINUED | OUTPATIENT
Start: 2020-11-27 | End: 2020-12-04 | Stop reason: HOSPADM

## 2020-11-27 RX ADMIN — AZELASTINE HYDROCHLORIDE 1 SPRAY: 137 SPRAY, METERED NASAL at 23:53

## 2020-11-27 RX ADMIN — GUANFACINE 1 MG: 1 TABLET ORAL at 23:52

## 2020-11-27 RX ADMIN — HYDROXYZINE HYDROCHLORIDE 25 MG: 25 TABLET, FILM COATED ORAL at 23:40

## 2020-11-27 RX ADMIN — SIMVASTATIN 40 MG: 20 TABLET, FILM COATED ORAL at 23:40

## 2020-11-27 RX ADMIN — MIRABEGRON 50 MG: 25 TABLET, FILM COATED, EXTENDED RELEASE ORAL at 20:30

## 2020-11-27 RX ADMIN — DONEPEZIL HYDROCHLORIDE 10 MG: 10 TABLET ORAL at 23:40

## 2020-11-27 RX ADMIN — PROPAFENONE HYDROCHLORIDE 150 MG: 150 TABLET, FILM COATED ORAL at 23:51

## 2020-11-27 RX ADMIN — MELATONIN TAB 3 MG 3 MG: 3 TAB at 23:40

## 2020-11-27 RX ADMIN — ACETAMINOPHEN 325 MG: 325 TABLET, FILM COATED ORAL at 23:40

## 2020-11-27 ASSESSMENT — ENCOUNTER SYMPTOMS
HEADACHES: 0
BACK PAIN: 1
COUGH: 0
WEAKNESS: 0
PALPITATIONS: 0
NAUSEA: 0
SHORTNESS OF BREATH: 0
SLEEP DISTURBANCE: 0
ABDOMINAL PAIN: 0
WOUND: 1
DIFFICULTY URINATING: 1
CHILLS: 0
WHEEZING: 0
HALLUCINATIONS: 0
VOMITING: 0
NECK PAIN: 0
DYSPHORIC MOOD: 1
DECREASED CONCENTRATION: 1
SORE THROAT: 0
SPEECH DIFFICULTY: 0
FEVER: 0
NUMBNESS: 0

## 2020-11-27 ASSESSMENT — ACTIVITIES OF DAILY LIVING (ADL)
ORAL_HYGIENE: INDEPENDENT
DRESS: INDEPENDENT
HYGIENE/GROOMING: INDEPENDENT

## 2020-11-27 ASSESSMENT — MIFFLIN-ST. JEOR: SCORE: 2245.96

## 2020-11-27 NOTE — ED NOTES
"Bed: ED01  Expected date: 11/27/20  Expected time:   Means of arrival:   Comments:  [facility] 64 male suicide attempt ingested 35 coumadin and \"handful\" of valium current VSS  "

## 2020-11-27 NOTE — ED NOTES
Pt wife Paul called. With pt permission to share information, wife updated. Wife requesting pt admitted. Primary RN updated.

## 2020-11-27 NOTE — ED PROVIDER NOTES
ED Provider Note  [facility]      History     Chief Complaint   Patient presents with     Suicide Attempt     Found locked in bedroom after having arguement with daughter and son in law (on going issue), wife called PD, 35 coumadin 5 mg tabs and unknown amount of diazepam about 1 hour ago 1430, hx of depression/back surgery/arrythrmia not taking medication/suicide attempts,      HPI  Todd S Aschoff is a 64 year old male who presents following suicide attempt by overdose. He was in a locked room at home during a family argument. He took 35 x 5 mg warfarin and an unknown number of 5 mg diazepam in suicide attempt. He also superficially cut his left wrist. He has a history of depression and chronic lower back pain. He has chronic atrial fibrillation and aortic valve replacement. He states there has been family discord for a long time, but that it came to a head today. He has been followed by a psychiatrist for major depression. His previous psychiatrist went on sabbatical and he doesn't like the current psychiatrist. He is on Effexor for depression. He is supposed to be on propafenone for arrhythmia but hasn't taken it for 2 weeks due to a pharmacy issue. He lives with his wife and a daughter. He denies prior suicide attempt. He did try to cut his wrist at about 1 PM today, before overdosing at about 3 PM. He denies fever, chills, URI symptoms, cough, sputum, shortness of breath, chest pain, cough, nausea or vomiting.      Past Medical History:   Diagnosis Date     Alcohol dependence (H)      Allergy, unspecified not elsewhere classified     seasonal     Antiplatelet or antithrombotic long-term use     coumadin/lovenox     Aortic valve prosthesis present      Atrial fibrillation (H)      Blood transfusion     after heart surg 1992     BPH (benign prostatic hypertrophy)      Chronic infection     low back wound incision not healing      Chronic pain     lower back and right leg and left leg      Coagulation disorder (H)     on blood thinners     Dissection of aorta, thoracic (H) 1992    St Jose F aotic valve + arch graft 1992     Headache(784.0)      Heart murmur     aortic valve replaced and arch     History of spinal cord injury      Low back pain      Major depression      Mixed hyperlipidemia      Numbness and tingling     right leg post surg rightt hip/ also left leg since surg     OA (osteoarthritis)     hips     Obesity, unspecified      Prostate infection      Sciatica 2002    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 Jose F's valve     C TOTAL HIP ARTHROPLASTY  2010    Left AMANDA     C TOTAL HIP ARTHROPLASTY  2002    R hip replacement comp's by nerve injury with pain down into R leg, nadya below knee     CATARACT IOL, RT/LT      rt eye only     CHOLECYSTECTOMY, LAPOROSCOPIC  8/10     COLONOSCOPY N/A 1/15/2015    Procedure: COLONOSCOPY;  Surgeon: Johnson Kothari MD;  Location:  GI     DECOMPRESSION LUMBAR ONE LEVEL  4/3/2013    Procedure: DECOMPRESSION LUMBAR ONE LEVEL;  Open Decompression L3-4 bilateral;  Surgeon: Travis Coffey MD;  Location: [facility] OR     DECOMPRESSION, FUSION CERVICAL ANTERIOR ONE LEVEL, COMBINED  3/23/2012    Procedure:COMBINED DECOMPRESSION, FUSION CERVICAL ANTERIOR ONE LEVEL; Anterior Cervical Decompression and Fusion C4-6; Surgeon:TRAVIS COFFEY; Location: OR     EXPLORE SPINE, REMOVE HARDWARE, COMBINED  5/23/2013    Procedure: COMBINED EXPLORE SPINE, REMOVE HARDWARE;  Exploration Lumbar Wound for fluid collection;  Surgeon: Travis Coffey MD;  Location: [facility] OR     FUSION CERVICAL ANTERIOR TWO LEVELS  3/26/2012    Procedure:FUSION CERVICAL ANTERIOR TWO LEVELS; Anterior Cervical Fusion C4-6, Anterior Cervical  Hematoma Evacuation; Surgeon:TRAVIS COFFEY; Location:[facility] OR     IRRIGATION AND DEBRIDEMENT SPINE, CLOSE WOUND,  COMBINED  3/26/2012    Procedure:COMBINED IRRIGATION AND DEBRIDEMENT SPINE, CLOSE WOUND; Surgeon:TRAVIS COFFEY; Location:[facility] OR     removal of cyst of back   2.5week     TONSILLECTOMY       wisdom teeth[       ZZC NONSPECIFIC PROCEDURE      repair of TAA with graft       Family History   Problem Relation Age of Onset     Cerebrovascular Disease Father          age 86, had M.I. ,diabetic also     Prostate Cancer Father      Cancer Mother          age 83 of dementia, also h/o lymphoma     Hypertension Brother         2 brothers with hypertension & sleep apnea     Hypertension Sister         Born ~1936     Sleep Apnea Brother          age 78, Alzheimers     Family History Negative Brother         Had 5 brothers, three still alive as of 2019     Colon Cancer No family hx of        Social History     Tobacco Use     Smoking status: Never Smoker     Smokeless tobacco: Never Used   Substance Use Topics     Alcohol use: No     Comment: Stopped drinking alcohol ~            Review of Systems   Constitutional: Negative for chills and fever.   HENT: Positive for hearing loss. Negative for congestion and sore throat.    Respiratory: Negative for cough, shortness of breath and wheezing.    Cardiovascular: Negative for chest pain, palpitations and leg swelling.   Gastrointestinal: Negative for abdominal pain, nausea and vomiting.   Genitourinary: Positive for difficulty urinating.   Musculoskeletal: Positive for back pain. Negative for neck pain.   Skin: Positive for wound. Negative for rash.   Neurological: Negative for speech difficulty, weakness, numbness and headaches.   Psychiatric/Behavioral: Positive for decreased concentration, dysphoric mood and suicidal ideas. Negative for hallucinations and sleep disturbance.         Physical Exam   BP: 134/82  Pulse: 85  Temp: 98  F (36.7  C)  Resp: 10  Weight: 147.4 kg (325 lb)  SpO2: 97 %  Physical Exam  Vitals signs and nursing note reviewed.    Constitutional:       Appearance: Normal appearance.   HENT:      Head: Normocephalic and atraumatic.      Right Ear: External ear normal.      Left Ear: External ear normal.      Nose: Nose normal.      Mouth/Throat:      Mouth: Mucous membranes are moist.   Eyes:      Pupils: Pupils are equal, round, and reactive to light.      Comments: Right eye decreased ROM   Neck:      Musculoskeletal: Normal range of motion and neck supple.   Cardiovascular:      Rate and Rhythm: Normal rate and regular rhythm.      Heart sounds: No murmur.      Comments: Crisp prosthetic closure sound  Pulmonary:      Effort: Pulmonary effort is normal.      Breath sounds: Normal breath sounds.   Abdominal:      General: Abdomen is protuberant. Bowel sounds are normal.      Palpations: Abdomen is soft.      Tenderness: There is no abdominal tenderness.   Musculoskeletal:      Right lower leg: No edema.      Left lower leg: No edema.   Skin:     General: Skin is warm and dry.   Neurological:      General: No focal deficit present.      Mental Status: He is alert and oriented to person, place, and time.      Cranial Nerves: Cranial nerve deficit present.      Motor: No weakness.   Psychiatric:         Attention and Perception: Attention normal.         Mood and Affect: Mood is depressed.         Speech: Speech normal.         Behavior: Behavior normal.         Thought Content: Thought content includes suicidal ideation. Thought content includes suicidal plan.         Cognition and Memory: Cognition normal.         Judgment: Judgment is impulsive.         ED Course      Procedures             EKG Interpretation:      Interpreted by ZULY HARP MD  Time reviewed: 5342  Symptoms at time of EKG: None   Rhythm: normal sinus   Rate: Normal  Axis: Normal  Ectopy: none  Conduction: normal  ST Segments/ T Waves: No ST-T wave changes and No acute ischemic changes  Q Waves: III and aVf  Comparison to prior: Unchanged from 3/6/17    Clinical  Impression: no acute changes      Labs/Imaging    Results for orders placed or performed during the hospital encounter of 11/27/20 (from the past 24 hour(s))   CBC with platelets differential   Result Value Ref Range    WBC 6.9 4.0 - 11.0 10e9/L    RBC Count 5.53 4.4 - 5.9 10e12/L    Hemoglobin 17.9 (H) 13.3 - 17.7 g/dL    Hematocrit 51.1 40.0 - 53.0 %    MCV 92 78 - 100 fl    MCH 32.4 26.5 - 33.0 pg    MCHC 35.0 31.5 - 36.5 g/dL    RDW 13.1 10.0 - 15.0 %    Platelet Count 222 150 - 450 10e9/L    Diff Method Automated Method     % Neutrophils 68.9 %    % Lymphocytes 19.0 %    % Monocytes 9.6 %    % Eosinophils 1.5 %    % Basophils 0.6 %    % Immature Granulocytes 0.4 %    Nucleated RBCs 0 0 /100    Absolute Neutrophil 4.7 1.6 - 8.3 10e9/L    Absolute Lymphocytes 1.3 0.8 - 5.3 10e9/L    Absolute Monocytes 0.7 0.0 - 1.3 10e9/L    Absolute Eosinophils 0.1 0.0 - 0.7 10e9/L    Absolute Basophils 0.0 0.0 - 0.2 10e9/L    Abs Immature Granulocytes 0.0 0 - 0.4 10e9/L    Absolute Nucleated RBC 0.0    INR   Result Value Ref Range    INR 2.87 (H) 0.86 - 1.14   Comprehensive metabolic panel   Result Value Ref Range    Sodium 139 133 - 144 mmol/L    Potassium 3.8 3.4 - 5.3 mmol/L    Chloride 105 94 - 109 mmol/L    Carbon Dioxide 30 20 - 32 mmol/L    Anion Gap 4 3 - 14 mmol/L    Glucose 129 (H) 70 - 99 mg/dL    Urea Nitrogen 16 7 - 30 mg/dL    Creatinine 0.98 0.66 - 1.25 mg/dL    GFR Estimate 82 >60 mL/min/[1.73_m2]    GFR Estimate If Black >90 >60 mL/min/[1.73_m2]    Calcium 8.4 (L) 8.5 - 10.1 mg/dL    Bilirubin Total 1.7 (H) 0.2 - 1.3 mg/dL    Albumin 3.6 3.4 - 5.0 g/dL    Protein Total 7.0 6.8 - 8.8 g/dL    Alkaline Phosphatase 85 40 - 150 U/L    ALT 39 0 - 70 U/L    AST 28 0 - 45 U/L   Troponin I   Result Value Ref Range    Troponin I ES <0.015 0.000 - 0.045 ug/L   CRP inflammation   Result Value Ref Range    CRP Inflammation 2.9 0.0 - 8.0 mg/L   Alcohol ethyl   Result Value Ref Range    Ethanol g/dL <0.01 <0.01 g/dL    Acetaminophen level   Result Value Ref Range    Acetaminophen Level <2 mg/L   Salicylate level   Result Value Ref Range    Salicylate Level <2 mg/dL   UA with Microscopic   Result Value Ref Range    Color Urine Yellow     Appearance Urine Clear     Glucose Urine Negative NEG^Negative mg/dL    Bilirubin Urine Negative NEG^Negative    Ketones Urine Negative NEG^Negative mg/dL    Specific Gravity Urine 1.016 1.003 - 1.035    Blood Urine Trace (A) NEG^Negative    pH Urine 5.5 5.0 - 7.0 pH    Protein Albumin Urine 10 (A) NEG^Negative mg/dL    Urobilinogen mg/dL Normal 0.0 - 2.0 mg/dL    Nitrite Urine Negative NEG^Negative    Leukocyte Esterase Urine Negative NEG^Negative    Source Midstream Urine     WBC Urine 1 0 - 5 /HPF    RBC Urine 3 (H) 0 - 2 /HPF    Squamous Epithelial /HPF Urine <1 0 - 1 /HPF    Mucous Urine Present (A) NEG^Negative /LPF   Drug abuse screen 6 urine (chem dep)   Result Value Ref Range    Amphetamine Qual Urine Negative NEG^Negative    Barbiturates Qual Urine Negative NEG^Negative    Benzodiazepine Qual Urine Positive (A) NEG^Negative    Cannabinoids Qual Urine Negative NEG^Negative    Cocaine Qual Urine Negative NEG^Negative    Ethanol Qual Urine Negative NEG^Negative    Opiates Qualitative Urine Negative NEG^Negative     *Note: Due to a large number of results and/or encounters for the requested time period, some results have not been displayed. A complete set of results can be found in Results Review.       Medications - No data to display     Assessments & Plan (with Medical Decision Making)   Impression:  Middle aged male with long term major depression, chronic pain disorder, long term benzodiazepine use for back pain, and chronic anticoagulation with warfarin for paroxysmal atrial fibrillation and history of aortic valve replacement. He presents after a suicide attempt by overdose  On valium and coumadin. He was initially lethargic with marginal respiratory rate. He remained awake and  responsive to verbal stimuli to increase frequency of breaths. He did not desaturate or develop elevated ET CO2. He was observed for 4 hours in the ED. He  Took the medication at about 2 PM. He has passed the anticipated peak effect of the valium. He was discussed with poison control. Effect of the coumadin may take 2-3 days to show peak effect. Vitamin K was not advised. Serial monitoring of INR every 8-12 hours was recommended with treatment with vitamin K per hospital protocol if INR exceeds the therapeutic range. This does appear to have been a legitimate suicidal gesture/attempt. He was interviewed by the DEC . He has ongoing suicidal ideation. He was poorly cooperative at home. He is impulsive. He appears past the period of concern regarding the valium ingestion. He will need INR monitoring every 8-12 hours for 2-3 days as inpatient. He appears stable for admission to the inpatient mental health unit with medicine consultation. He will be placed on 72 hour hold.    I have reviewed the nursing notes. I have reviewed the findings, diagnosis, plan and need for follow up with the patient.    New Prescriptions    No medications on file       Final diagnoses:   Suicide attempt by multiple drug overdose, initial encounter (H)   Depression, unspecified depression type       --  Felipe Torrez  [facility] EMERGENCY DEPARTMENT  11/27/2020     Felipe Torrez MD  11/28/20 0238

## 2020-11-27 NOTE — ED NOTES
Dr. Torrez informed pt RR 6 other vitals WNL. Pt awake. No new orders, staff to continue to monitor and keep pt awake.

## 2020-11-28 LAB
INR PPP: 3.78 (ref 0.86–1.14)
INR PPP: 5.71 (ref 0.86–1.14)
INR PPP: 8.73 (ref 0.86–1.14)
INR PPP: >10 (ref 0.86–1.14)
INTERPRETATION ECG - MUSE: NORMAL
LABORATORY COMMENT REPORT: NORMAL
SARS-COV-2 RNA SPEC QL NAA+PROBE: NEGATIVE
SPECIMEN SOURCE: NORMAL

## 2020-11-28 PROCEDURE — 99223 1ST HOSP IP/OBS HIGH 75: CPT | Mod: 95 | Performed by: CLINICAL NURSE SPECIALIST

## 2020-11-28 PROCEDURE — 250N000013 HC RX MED GY IP 250 OP 250 PS 637: Performed by: INTERNAL MEDICINE

## 2020-11-28 PROCEDURE — 36415 COLL VENOUS BLD VENIPUNCTURE: CPT | Performed by: PHYSICIAN ASSISTANT

## 2020-11-28 PROCEDURE — 99232 SBSQ HOSP IP/OBS MODERATE 35: CPT | Performed by: PHYSICIAN ASSISTANT

## 2020-11-28 PROCEDURE — 36415 COLL VENOUS BLD VENIPUNCTURE: CPT | Performed by: CLINICAL NURSE SPECIALIST

## 2020-11-28 PROCEDURE — 250N000013 HC RX MED GY IP 250 OP 250 PS 637: Performed by: CLINICAL NURSE SPECIALIST

## 2020-11-28 PROCEDURE — 124N000003 HC R&B MH SENIOR/ADOLESCENT

## 2020-11-28 PROCEDURE — 85610 PROTHROMBIN TIME: CPT | Performed by: PHYSICIAN ASSISTANT

## 2020-11-28 PROCEDURE — 85610 PROTHROMBIN TIME: CPT | Performed by: CLINICAL NURSE SPECIALIST

## 2020-11-28 PROCEDURE — 99207 PR CONSULT E&M CHANGED TO SUBSEQUENT LEVEL: CPT | Performed by: PHYSICIAN ASSISTANT

## 2020-11-28 RX ORDER — DIAZEPAM 5 MG
5 TABLET ORAL EVERY 8 HOURS PRN
Status: DISCONTINUED | OUTPATIENT
Start: 2020-11-28 | End: 2020-11-30

## 2020-11-28 RX ORDER — VENLAFAXINE HYDROCHLORIDE 150 MG/1
300 CAPSULE, EXTENDED RELEASE ORAL
Status: DISCONTINUED | OUTPATIENT
Start: 2020-11-29 | End: 2020-12-03

## 2020-11-28 RX ORDER — PHYTONADIONE 5 MG/1
5 TABLET ORAL ONCE
Status: COMPLETED | OUTPATIENT
Start: 2020-11-28 | End: 2020-11-28

## 2020-11-28 RX ADMIN — CYCLOBENZAPRINE HYDROCHLORIDE 10 MG: 5 TABLET, FILM COATED ORAL at 14:11

## 2020-11-28 RX ADMIN — LEVOTHYROXINE SODIUM 150 MCG: 75 TABLET ORAL at 08:15

## 2020-11-28 RX ADMIN — PROPAFENONE HYDROCHLORIDE 150 MG: 150 TABLET, FILM COATED ORAL at 06:47

## 2020-11-28 RX ADMIN — PROPAFENONE HYDROCHLORIDE 150 MG: 150 TABLET, FILM COATED ORAL at 22:44

## 2020-11-28 RX ADMIN — ACETAMINOPHEN 325 MG: 325 TABLET, FILM COATED ORAL at 11:51

## 2020-11-28 RX ADMIN — MIRABEGRON 50 MG: 25 TABLET, FILM COATED, EXTENDED RELEASE ORAL at 08:15

## 2020-11-28 RX ADMIN — ACETAMINOPHEN 325 MG: 325 TABLET, FILM COATED ORAL at 23:58

## 2020-11-28 RX ADMIN — DONEPEZIL HYDROCHLORIDE 10 MG: 10 TABLET ORAL at 20:45

## 2020-11-28 RX ADMIN — ACETAMINOPHEN 325 MG: 325 TABLET, FILM COATED ORAL at 18:28

## 2020-11-28 RX ADMIN — VENLAFAXINE HYDROCHLORIDE 150 MG: 150 CAPSULE, EXTENDED RELEASE ORAL at 08:15

## 2020-11-28 RX ADMIN — ACETAMINOPHEN 325 MG: 325 TABLET, FILM COATED ORAL at 05:49

## 2020-11-28 RX ADMIN — PREGABALIN 100 MG: 100 CAPSULE ORAL at 08:15

## 2020-11-28 RX ADMIN — PHYTONADIONE 5 MG: 5 TABLET ORAL at 18:29

## 2020-11-28 RX ADMIN — PROPAFENONE HYDROCHLORIDE 150 MG: 150 TABLET, FILM COATED ORAL at 15:19

## 2020-11-28 RX ADMIN — CYCLOBENZAPRINE HYDROCHLORIDE 10 MG: 5 TABLET, FILM COATED ORAL at 20:43

## 2020-11-28 RX ADMIN — AZELASTINE HYDROCHLORIDE 1 SPRAY: 137 SPRAY, METERED NASAL at 20:44

## 2020-11-28 RX ADMIN — AZELASTINE HYDROCHLORIDE 1 SPRAY: 137 SPRAY, METERED NASAL at 08:16

## 2020-11-28 RX ADMIN — GUANFACINE 1 MG: 1 TABLET ORAL at 20:46

## 2020-11-28 RX ADMIN — SIMVASTATIN 40 MG: 20 TABLET, FILM COATED ORAL at 22:44

## 2020-11-28 RX ADMIN — CYCLOBENZAPRINE HYDROCHLORIDE 10 MG: 5 TABLET, FILM COATED ORAL at 08:15

## 2020-11-28 RX ADMIN — PREGABALIN 100 MG: 100 CAPSULE ORAL at 20:43

## 2020-11-28 RX ADMIN — PREGABALIN 100 MG: 100 CAPSULE ORAL at 14:11

## 2020-11-28 ASSESSMENT — ACTIVITIES OF DAILY LIVING (ADL)
ORAL_HYGIENE: INDEPENDENT
DRESS: INDEPENDENT
HYGIENE/GROOMING: INDEPENDENT
LAUNDRY: WITH SUPERVISION

## 2020-11-28 NOTE — PROGRESS NOTES
Patient calm and cooperative overnight.  Remains on 1:1 for SI.  Med compliant.  Denies SI/SIB.  Patient slept for hours. Patient reporting that he did not sleep well last night.  Reports bed was uncomfortable.  Patient requesting a medical bed.  Patient with hx of back pain.  COVID results pending.  Patient understands he will need to remain in his room until COVID results are in.  Patient verbalized understanding.  Patient slept for 6.5 hrs.

## 2020-11-28 NOTE — PLAN OF CARE
"Patient was calm and social with staff and peers. He reports high anxiety and depression but denies any suicidal or self injury thoughts. Patient stated he had been having a lot of marital conflicts lately and that led  him to became suicidal.     Patient's wife called the unit upset and crying on the phone. She sates pt called her and blamed her for his suicide attempt. Pt's wife states she is concerned about pt leaving the hospital too early and does not feel safe to have him home.   Pt declined to sign AUTUMN for his wife and stated \"I am  her and don't want anyone talking to her about me\".   "

## 2020-11-28 NOTE — SAFE
Todd S Aschoff  November 27, 2020  64 year old  white male who is admitted following intentional ingestion.     Ingestion     Inappropriate Sexual Behavior: No    Aggression/Homicidal Ideation: Specific Victim  Verbal aggression against daughter.  Pt continues to express hopelessness and helplessness.     For additional details see full DEC assessment.       Brenna Aguillon, LP
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
## 2020-11-28 NOTE — PHARMACY-CONSULT NOTE
Clinical Pharmacy - Phytonadione Dosing Consult     Pharmacy has been consulted to manage this patient s elevated INR while holding warfarin therapy (supratherapeutic dose taken prior to admission on 11/27/20).    Patient is on warfarin for chronic atrial fibrillation and prosthetic aortic valve replacement.     INR   Date Value Ref Range Status   11/28/2020 5.71 (HH) 0.86 - 1.14 Final     Comment:     Critical Value called to and read back by  KATELIN MAJANO RN ON 11.28.20 AT 0845 KT     11/27/2020 3.78 (H) 0.86 - 1.14 Final       Per ED Provider note, poison control advised effect of warfarin may take 2-3 days to show peak effect and vitamin K was not advised at that time. Serial monitoring of INR every 8-12 hours was recommended with treatment with vitamin K per hospital protocol if INR exceeds the therapeutic range.    Most recent INR is 5.71 this morning. Confirmed with RN that no bleeding was noted. [facility] Warfarin Reversal Guidelines recommend holding warfarin therapy and to consider vitamin K if > 3 doses of warfarin have been held (dose #1 to be held tonight) for any INR between 4.5-9.9 without bleeding.    Recommend continuing to hold today. If INR > 10, recommend giving 1-2 mg PO vitamin K once per hospital protocol. Pharmacy will monitor Todd S Aschoff's subsequent INRs and patient status.    Please contact pharmacy with any further questions/concerns, or if patient status changes or new bleeding is noted.    Nicollette McMann, PharmD  [facility]: Ascom *[phone] or [phone]

## 2020-11-28 NOTE — PLAN OF CARE
"Pt has a critical INR of 8.73 due to overdose on coumadin.  Writer paged on call hospitalist.  Writer updated hospitalist regarding INR and writer spoke with Mahesh at poison control.  Per Mahesh ok to give vitamin K.  Writer updated hospitalist and provided him with Mahesh's number at poison control for recommendations.  Pt denies any s/s of bleeding, no dark BM, no blood urine, not coughing up blood, scleras white, and no bloody nose etc.  Pt states understanding of s/s of bleeding and will inform staff if any symptoms occur.  Pt was given Vitamin K orally as prescribed.      Pt is irritable and states \"how long is this INR going to take to get under control because I am only going to be here for 72 hours.\"  Pt was upset he could not reach his wife.      Pt was extremely irritable this evening when writer tried to do a check in.  Pt denied any SI/SIB but was rude with writer and states \"I know you're just trying to keep me here longer and I am leaving as soon as my 72 hours are up.  Pt continues to have an SIO for safety concerns.  "

## 2020-11-28 NOTE — CONSULTS
"Consult Date:  11/28/2020      HISTORY OF PRESENT ILLNESS:  The patient is a 64-year-old gentleman with history of depression, admitted to station 3B status post overdose on 35, 5 mg tabs of warfarin as well as a \"handful\" of diazepam in a suicide attempt.  States he took the overdose at approximately 3:00 p.m. yesterday.  Brought to the Emergency Department here at [place] and Poison Control was contacted and noted that peak effect of the overdose on warfarin would be in approximately 2-3 days.  Initial warfarin was 2.87.  This morning's INR is 5.71.  The patient denies any bleeding.  He did also try to cut his wrist.  However, this was not successful, only resulting in superficial lacerations.  Currently, patient is on a 1:1 and denies acute physical concerns at this time other than his superficial lacerations.  Denies fever, chills, chest pain, shortness of breath, cough, abdominal pain, nausea, bowel or bladder concerns.      PAST MEDICAL HISTORY:   1.  Depression and other psychiatric history per Dr. Veronica.   2.  Chronic warfarin use, status post mechanical aortic valve replacement with aortic grafting secondary to aneurysm, 1992.   3.  Chronic atrial fibrillation.   4.  Chronic back pain, status post lumbar fusion, 2018.   5.  Allergic rhinitis.   6.  Hypothyroidism, on replacement.   7.  Overactive bladder.   8.  Hyperlipidemia.      Past Surgical History:   Procedure Laterality Date     AORTIC VALVE REPLACEMENT  1992    [facility] F's valve     C TOTAL HIP ARTHROPLASTY  2010    Left AMANDA     C TOTAL HIP ARTHROPLASTY  2002    R hip replacement comp's by nerve injury with pain down into R leg, nadya below knee     CATARACT IOL, RT/LT      rt eye only     CHOLECYSTECTOMY, LAPOROSCOPIC  8/10     COLONOSCOPY N/A 1/15/2015    Procedure: COLONOSCOPY;  Surgeon: Johnson Kothari MD;  Location:  GI     DECOMPRESSION LUMBAR ONE LEVEL  4/3/2013    Procedure: DECOMPRESSION LUMBAR ONE LEVEL;  Open Decompression L3-4 " bilateral;  Surgeon: Khalif Coffey MD;  Location: [facility] OR     DECOMPRESSION, FUSION CERVICAL ANTERIOR ONE LEVEL, COMBINED  3/23/2012    Procedure:COMBINED DECOMPRESSION, FUSION CERVICAL ANTERIOR ONE LEVEL; Anterior Cervical Decompression and Fusion C4-6; Surgeon:KHALIF COFFEY; Location:[facility] OR     EXPLORE SPINE, REMOVE HARDWARE, COMBINED  5/23/2013    Procedure: COMBINED EXPLORE SPINE, REMOVE HARDWARE;  Exploration Lumbar Wound for fluid collection;  Surgeon: Khalif Coffey MD;  Location: [facility] OR     FUSION CERVICAL ANTERIOR TWO LEVELS  3/26/2012    Procedure:FUSION CERVICAL ANTERIOR TWO LEVELS; Anterior Cervical Fusion C4-6, Anterior Cervical  Hematoma Evacuation; Surgeon:KHALIF COFFEY; Location:[facility] OR     IRRIGATION AND DEBRIDEMENT SPINE, CLOSE WOUND, COMBINED  3/26/2012    Procedure:COMBINED IRRIGATION AND DEBRIDEMENT SPINE, CLOSE WOUND; Surgeon:KHALIF COFFEY; Location:[facility] OR     removal of cyst of back   2.5week     TONSILLECTOMY       wisdom teeth[       ZZC NONSPECIFIC PROCEDURE  1992    repair of TAA with graft         ADMISSION MEDICATIONS:  Reviewed and listed in the medication reconciliation list.      ALLERGIES:  GABAPENTIN.      SOCIAL HISTORY:  .  Lives in [place].      FAMILY HISTORY:  Reviewed and noncontributory.      REVIEW OF SYSTEMS:  Ten-point review of systems negative except as stated above in history of present illness.      PHYSICAL EXAMINATION:   GENERAL:  Nontoxic-appearing gentleman in no acute distress.   VITAL SIGNS:  Stable.  Temperature afebrile, pulse in the 70s, blood pressure 140s/90s, oxygen saturation 97% on room air.   HEENT:  Negative.   NECK:  Supple.  No cervical lymphadenopathy or thyromegaly.   LUNGS:  Clear.   CARDIOVASCULAR:  Irregularly irregular.   ABDOMEN:  Soft, nontender.   EXTREMITIES:  No edema.   SKIN:  No rash on exposed areas.  Superficial lacerations noted on his upper extremities without evidence of bleeding.   NEUROLOGIC:  He  is awake, alert and oriented x 3.  Cranial nerves grossly intact.  Motor strength is symmetric.  He is not tremulous.      LABORATORY DATA:  Urine drug screen positive for benzodiazepines.  INR 5.71 (3.7), otherwise CBC and comprehensive metabolic panel unremarkable.  CRP and troponin level negative.  Urinalysis unremarkable.  Tylenol, alcohol and salicylate levels negative.      ASSESSMENT:   1.  Status post suicide attempt by overdose on warfarin and diazepam, patient is medically stable, treatment to be continued and followed by Dr. Veronica.   2.  Chronic warfarin use secondary to his chronic atrial fibrillation and mechanical aortic valve replacement with goal INR 2.5-3.5, however, due to above overdose, INR now on to trend upward, currently 5.71; however, patient denies any spontaneous bleeding including from this morning's IV blood draw and superficial lacerations.  Poison Control has already been contacted and following along as well.   3.  Chronic back pain, stable on scheduled Flexeril and Tylenol.   4.  Hyperlipidemia.   5.  Overactive bladder, stable on prior to admission mirabegron.   6.  Hypothyroidism, on replacement prior to admission. The patient has not had recent thyroid function studies.      PLAN:  As above, Poison Control is actively following the patient and his INR levels.  Will obtain ongoing INR levels every eight hours.  Will consult pharmacy to manage plan regarding when, and what dosing of vitamin K, if indicated. Will need to monitor closely for spontaneous bleeding.  Will add on to labs already drawn a TSH reflex.  No further medical intervention indicated at this time.  The patient is medically stable and Medicine will continue to follow.  Please feel free to call with questions.      Thank you for this consultation.         RONA CARRASCO PA-C             D: 11/28/2020   T: 11/28/2020   MT: MAMADOU      Name:     ASCHOFF, TODD   MRN:      [id number]        Account:        [id number]   :      1955           Consult Date:  2020      Document: [id number]

## 2020-11-28 NOTE — PHARMACY-ADMISSION MEDICATION HISTORY
Admission Medication History Completed by Pharmacy    See [facility] Admission Navigator for allergy information, preferred outpatient pharmacy, prior to admission medications and immunization status.     Medication History Sources:     Surescripts, CareEverywhere, and patient interview (via telephone)    Called [facility] in [place], [place] to confirm last fills of medications that did not populate into Surescripts (listed below)    Changes made to PTA medication list (reason):    Added: None    Deleted:   o /completed prescriptions: ciprofloxacin, enoxaparin (no refills, only taking prior to procedures), Midrin (rx from 2016, product not on market anymore)  o Duplicate record: acetaminophen    Changed: None    Additional Information:    Last filled diazepam 5 mg on 10/02/20 for #100 tabs (33 day supply)    Azelastine: patient reports still taking medication. Last filled May 2019.    Levothyroxine: patient reports still taking medication. Writer called [facility] and found prescription was last dispensed in . A new script was issued 10/29/19, but was never filled and recently  on 10/29/20.    Propafenone: prescription was last filled and picked up on 20 for a 90 day supply (prescription was filled and ready on 10/30/20 per pharmacy).    Prior to Admission medications    Medication Sig Last Dose Taking? Auth Provider   azelastine (ASTELIN) 0.1 % nasal spray USE 2 SPRAYS IN NOSTRIL 2 TIMES DAILY AS NEEDED. 2020 at Unknown time Yes Obdulio Ingram MD   cyclobenzaprine (FLEXERIL) 10 MG tablet TAKE ONE TABLET BY MOUTH THREE TIMES DAILY AS NEEDED FOR MUSCLE SPASM. 2020 at Unknown time Yes Emily Taylor APRN CNP   diazepam (VALIUM) 5 MG tablet TAKE ONE TABLET BY MOUTH EVERY EIGHT HOURS AS NEEDED FOR ANXIETY OR MUSCLE SPASM 2020 at Unknown time Yes Obdulio Ingram MD   donepezil (ARICEPT) 10 MG tablet TAKE ONE TABLET BY MOUTH AT BEDTIME 2020 at Unknown time Yes Nataly  Obdulio DASH MD   guanFACINE (TENEX) 1 MG tablet Take 1 tablet (1 mg) by mouth At Bedtime 11/26/2020 at Unknown time Yes Obdulio Ingram MD   levothyroxine (SYNTHROID/LEVOTHROID) 150 MCG tablet Take 1 tablet (150 mcg) by mouth daily 11/26/2020 at Unknown time Yes Obdulio Ingram MD   MAPAP ARTHRITIS PAIN 650 MG CR tablet Take 1 tablet (650 mg) by mouth 2 times daily 11/27/2020 at Unknown time Yes Obdulio Ingram MD   mirabegron (MYRBETRIQ) 50 MG 24 hr tablet Take 1 tablet (50 mg) by mouth daily 11/27/2020 at Unknown time Yes Obdulio Ingram MD   pregabalin (LYRICA) 100 MG capsule TAKE 1 CAPSULE (100 MG) BY MOUTH 3 TIMES DAILY.  DO NOT TAKE IF SLEEPY/SEDATED. 11/27/2020 at Unknown time Yes Obdulio Ingram MD   propafenone (RYTHMOL) 150 MG TABS tablet Take 1 tablet (150 mg) by mouth every 8 hours Past Week at Unknown time Yes Obdulio Ingram MD   senna-docusate (SENOKOT-S;PERICOLACE) 8.6-50 MG per tablet Take 1 tablet by mouth 2 times daily as needed for constipation Past Week at Unknown time Yes Obdulio Ingram MD   simvastatin (ZOCOR) 40 MG tablet Take 1 tablet (40 mg) by mouth At Bedtime 11/26/2020 at Unknown time Yes Obdulio Ingram MD   venlafaxine (EFFEXOR-XR) 150 MG 24 hr capsule TAKE ONE CAPSULE BY MOUTH DAILY WITH BREAKFAST 11/26/2020 at Unknown time Yes Obdulio Ingram MD   warfarin ANTICOAGULANT (COUMADIN) 5 MG tablet Take 1.5 tablets (7.5 mg) by mouth Sundays; take 1 tablet (5 mg) all other days or as instructed by INR Clinic. 11/27/2020 at Unknown time Yes Obdulio Ingram MD   cetirizine (ZYRTEC) 10 MG tablet TAKE ONE TABLET BY MOUTH ONCE DAILY AS NEEDED Unknown at Not taking/doesn't need  Obdulio Ingram MD   traZODone (DESYREL) 50 MG tablet TAKE ONE TABLET BY MOUTH AT BEDTIME Unknown at Unknown time  Obdulio Ingram MD   triamcinolone (KENALOG) 0.1 % external cream Apply topically 2 times daily as needed (rash/itching) Unknown at Unknown time  Obdulio Ingram MD       Date completed:  11/28/20    Medication history completed by:    Nicollette McMann, Kvng  [facility]: Ascom *[phone] or [phone]
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
## 2020-11-28 NOTE — PLAN OF CARE
"SBAR    S = Situation:   Todd S Aschoff is a 64 year old year old male admitted to [facility] due to increased depression and suicide attempt via overdose. Patient is on a 72 hour hold.    B  = Background:   Patient reported that he had a disagreement with his family which led him to lock himself in a room and attempt suicide. He initially tried to cut his left wrist, but was unable. He does have superficial cuts on his left wrist. He then decided to take 35x5mg warfarin and a \"handful' of valium 5mg.    Per ED report, the peek for Warfarin will be in the next 3 days; there for INR should be checked every 8-12 hours.     Patient has a history of depression, chronic lower back pain, chronic atrial fibrillation and aortic valve replacement. Hx of MRSA.    Patient was being followed by a psychiatrist for major depression; his previous psychiatrist went on sabbatical and he doesn't like the current psychiatrist.     A  =  Assessment:   Patient was AOx3. Safety search was completed.    Patient denied current thoughts to harm himself.    Vital Signs: BP (!) 146/95   Pulse 84   Temp 97.4  F (36.3  C) (Temporal)   Resp 18   Ht 1.854 m (6' 1\")   Wt 139.7 kg (308 lb)   SpO2 95%   BMI 40.64 kg/m    R =   Request or Recommendation:   INR blood draw ordered for every 8 hours. Internal medicine and psychiatrist to assess. PTA medications ordered. Influenza vaccine ordered per patient request. Precautions: Suicide.  "
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
## 2020-11-28 NOTE — PROGRESS NOTES
11/27/20 2219   Patient Belongings   Did you bring any home meds/supplements to the hospital?  No   Patient Belongings locker   Patient Belongings Put in Hospital Secure Location (Security or Locker, etc.) other (see comments)   Belongings Search Yes  (Jacinto VINSON)   Clothing Search Yes  (Jacinto VINSON)   Second Staff Ronaldo VINSON     Pt Belongings left on unit: Sweatpants, T Shirt, Underwear.    No wallet, Phone, or ID    A               Admission:  I am responsible for any personal items that are not sent to the safe or pharmacy.  [facility] is not responsible for loss, theft or damage of any property in my possession.    Signature:  _________________________________ Date: _______  Time: _____                                              Staff Signature:  ____________________________ Date: ________  Time: _____      2nd Staff person, if patient is unable/unwilling to sign:    Signature: ________________________________ Date: ________  Time: _____     Discharge:  [facility] has returned all of my personal belongings:    Signature: _________________________________ Date: ________  Time: _____                                          Staff Signature:  ____________________________ Date: ________  Time: _____
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
## 2020-11-28 NOTE — ED NOTES
Poison Control called ER: Made aware of lab values and vitals, as well as patient's mental status. Stated that INR should be checked q8-q12 hours for the next 2-3 days. Limit Vitamin K. States that they will call again in morning for update.

## 2020-11-29 LAB
INR PPP: 4.21 (ref 0.86–1.14)
INR PPP: 4.3 (ref 0.86–1.14)
INR PPP: 5.25 (ref 0.86–1.14)

## 2020-11-29 PROCEDURE — H2032 ACTIVITY THERAPY, PER 15 MIN: HCPCS

## 2020-11-29 PROCEDURE — 85610 PROTHROMBIN TIME: CPT | Performed by: INTERNAL MEDICINE

## 2020-11-29 PROCEDURE — 36415 COLL VENOUS BLD VENIPUNCTURE: CPT | Performed by: INTERNAL MEDICINE

## 2020-11-29 PROCEDURE — 250N000011 HC RX IP 250 OP 636: Performed by: INTERNAL MEDICINE

## 2020-11-29 PROCEDURE — 258N000003 HC RX IP 258 OP 636: Performed by: INTERNAL MEDICINE

## 2020-11-29 PROCEDURE — 250N000011 HC RX IP 250 OP 636: Performed by: CLINICAL NURSE SPECIALIST

## 2020-11-29 PROCEDURE — G0008 ADMIN INFLUENZA VIRUS VAC: HCPCS | Performed by: CLINICAL NURSE SPECIALIST

## 2020-11-29 PROCEDURE — 250N000013 HC RX MED GY IP 250 OP 250 PS 637: Performed by: CLINICAL NURSE SPECIALIST

## 2020-11-29 PROCEDURE — 124N000003 HC R&B MH SENIOR/ADOLESCENT

## 2020-11-29 PROCEDURE — 90682 RIV4 VACC RECOMBINANT DNA IM: CPT | Performed by: CLINICAL NURSE SPECIALIST

## 2020-11-29 RX ADMIN — INFLUENZA A VIRUS A/HAWAII/70/2019 (H1N1) RECOMBINANT HEMAGGLUTININ ANTIGEN, INFLUENZA A VIRUS A/MINNESOTA/41/2019 (H3N2) RECOMBINANT HEMAGGLUTININ ANTIGEN, INFLUENZA B VIRUS B/WASHINGTON/02/2019 RECOMBINANT HEMAGGLUTININ ANTIGEN, AND INFLUENZA B VIRUS B/PHUKET/3073/2013 RECOMBINANT HEMAGGLUTININ ANTIGEN 0.5 ML: 45; 45; 45; 45 INJECTION INTRAMUSCULAR at 13:25

## 2020-11-29 RX ADMIN — ACETAMINOPHEN 325 MG: 325 TABLET, FILM COATED ORAL at 12:11

## 2020-11-29 RX ADMIN — ACETAMINOPHEN 325 MG: 325 TABLET, FILM COATED ORAL at 06:17

## 2020-11-29 RX ADMIN — ACETAMINOPHEN 325 MG: 325 TABLET, FILM COATED ORAL at 18:29

## 2020-11-29 RX ADMIN — CYCLOBENZAPRINE HYDROCHLORIDE 10 MG: 5 TABLET, FILM COATED ORAL at 13:24

## 2020-11-29 RX ADMIN — CYCLOBENZAPRINE HYDROCHLORIDE 10 MG: 5 TABLET, FILM COATED ORAL at 08:18

## 2020-11-29 RX ADMIN — VENLAFAXINE HYDROCHLORIDE 300 MG: 150 CAPSULE, EXTENDED RELEASE ORAL at 08:18

## 2020-11-29 RX ADMIN — LEVOTHYROXINE SODIUM 150 MCG: 75 TABLET ORAL at 08:18

## 2020-11-29 RX ADMIN — CYCLOBENZAPRINE HYDROCHLORIDE 10 MG: 5 TABLET, FILM COATED ORAL at 21:14

## 2020-11-29 RX ADMIN — PHYTONADIONE 5 MG: 10 INJECTION, EMULSION INTRAMUSCULAR; INTRAVENOUS; SUBCUTANEOUS at 01:05

## 2020-11-29 RX ADMIN — SIMVASTATIN 40 MG: 20 TABLET, FILM COATED ORAL at 21:14

## 2020-11-29 RX ADMIN — PROPAFENONE HYDROCHLORIDE 150 MG: 150 TABLET, FILM COATED ORAL at 06:17

## 2020-11-29 RX ADMIN — MIRABEGRON 50 MG: 25 TABLET, FILM COATED, EXTENDED RELEASE ORAL at 18:29

## 2020-11-29 RX ADMIN — PROPAFENONE HYDROCHLORIDE 150 MG: 150 TABLET, FILM COATED ORAL at 15:23

## 2020-11-29 RX ADMIN — AZELASTINE HYDROCHLORIDE 1 SPRAY: 137 SPRAY, METERED NASAL at 08:18

## 2020-11-29 RX ADMIN — GUANFACINE 1 MG: 1 TABLET ORAL at 21:15

## 2020-11-29 RX ADMIN — PREGABALIN 100 MG: 100 CAPSULE ORAL at 08:18

## 2020-11-29 RX ADMIN — PREGABALIN 100 MG: 100 CAPSULE ORAL at 21:15

## 2020-11-29 RX ADMIN — DONEPEZIL HYDROCHLORIDE 10 MG: 10 TABLET ORAL at 21:15

## 2020-11-29 RX ADMIN — PROPAFENONE HYDROCHLORIDE 150 MG: 150 TABLET, FILM COATED ORAL at 21:15

## 2020-11-29 RX ADMIN — PREGABALIN 100 MG: 100 CAPSULE ORAL at 13:24

## 2020-11-29 RX ADMIN — AZELASTINE HYDROCHLORIDE 1 SPRAY: 137 SPRAY, METERED NASAL at 21:16

## 2020-11-29 ASSESSMENT — ACTIVITIES OF DAILY LIVING (ADL)
ORAL_HYGIENE: INDEPENDENT
LAUNDRY: WITH SUPERVISION
DRESS: INDEPENDENT
HYGIENE/GROOMING: INDEPENDENT
ORAL_HYGIENE: INDEPENDENT
DRESS: INDEPENDENT
HYGIENE/GROOMING: INDEPENDENT

## 2020-11-29 ASSESSMENT — MIFFLIN-ST. JEOR: SCORE: 2251.4

## 2020-11-29 NOTE — PLAN OF CARE
Problem: Behavioral Health Plan of Care  Goal: Absence of New-Onset Illness or Injury  Outcome: No Change     Received an order for IV Phytonadione 5mg in Sodium Chloride 50 ml infusion. Pharmacy and Nurse Flyer notified at 7226.  INR check increased to every 6 hours.  Patient informed of the result and plan.  Pt alert and oriented, Vital signs BP 93/58 P 72 R 16 T 98.2 O2 sats 96%.  Pt denies any signs of bleeding, Pt educated on not using razors, not to fall or bump into objects, and not to vigorously brush his teeth or just use mouth wash.  Remains on an SIO for Suicidality and SIB  Infusion started at 0103, tolerated procedure well.  Will continue to monitor.  Lab called for scheduled 0500  INR, drawn at 0600  Vital signs stable  Due Medications given.

## 2020-11-29 NOTE — PROGRESS NOTES
Internal Medicine Service Crosscover Note      Todd S Aschoff MRN# [id number]   YOB: 1955 Age: 64 year old           Interval History:     Paged by RN that follow-up INR was > 10. Received 5 mg of Vitamin K at 1800. No bleeding. Remains on 1:1.     Discussed with poison control. Will increase INR checks to q6h. They requested that we ask lab for actual numbers rather than > 10 to be able to better trend results. Discussed with lab, they are not able to report anything more specific than that and no lab in our system has that ability.     Given that we are not sure how much higher than 10 he is, will give an additional 5 mg IV vitamin K now to try to arrest the rise and get it back into measurable range.     Increased checks to q6h.     Given prosthetic valve and concern for overcorrecting, will not give more vitamin K tonight after this dose.     Niles Nichols MD   of Medicine  [facility] Hospitalist  Pager: [phone]  Cell: [phone]

## 2020-11-29 NOTE — PLAN OF CARE
"Patient is calm and social in the milieu. He denies anxiety, depression and SI/SIB. Patient talked at length about conflict with his wife and his plan to get a divorce. He states \" I feel fine, I just need to leave this toxic relationship\".   "

## 2020-11-29 NOTE — PROGRESS NOTES
Lab called with critical value - INR >10. On call contacted and advised of findings (Dr. Nichols) - no further orders given at this time, continue to monitor pt for bleeding, vitamin K should continue to work through night, do not want to reverse too quickly as could cause clotting complications with valve.     Pt remains on 1:1 at this time.

## 2020-11-29 NOTE — PROGRESS NOTES
Cross coverage    Patient was admitted with coumadin overdose, new INR 8.73, poison control following the patient and considered that next INR most likely will be > 9 and recommended to give one dose of vitamin K 5 mg oral and continue monitoring INR every 8 hrs.   I discussed the case with nursing staff, no clinical changes, no signs of bleeding. I also discussed the case with pharmacist.

## 2020-11-29 NOTE — PLAN OF CARE
Post assessment for discharge of SIO done and patient denies SI/SIB or even fleeting thoughts of SI. Denies any mental health symptoms at this time.  Patient has poor boundaries and insight.  Talking about marital problems x20 years and not sure what to do at this point.  Apparently open to counseling but afraid financially will not be able to afford.  P:  continue to monitor.

## 2020-11-30 DIAGNOSIS — N32.81 OVERACTIVE BLADDER: ICD-10-CM

## 2020-11-30 DIAGNOSIS — E78.5 HYPERLIPIDEMIA LDL GOAL <130: ICD-10-CM

## 2020-11-30 LAB
INR PPP: 5.02 (ref 0.86–1.14)
INR PPP: 5.11 (ref 0.86–1.14)
INR PPP: 5.4 (ref 0.86–1.14)
INR PPP: 6.6 (ref 0.86–1.14)
TSH SERPL DL<=0.005 MIU/L-ACNC: 2.17 MU/L (ref 0.4–4)

## 2020-11-30 PROCEDURE — 84443 ASSAY THYROID STIM HORMONE: CPT | Performed by: NURSE PRACTITIONER

## 2020-11-30 PROCEDURE — 124N000003 HC R&B MH SENIOR/ADOLESCENT

## 2020-11-30 PROCEDURE — 250N000013 HC RX MED GY IP 250 OP 250 PS 637: Performed by: CLINICAL NURSE SPECIALIST

## 2020-11-30 PROCEDURE — 85610 PROTHROMBIN TIME: CPT | Performed by: INTERNAL MEDICINE

## 2020-11-30 PROCEDURE — 250N000013 HC RX MED GY IP 250 OP 250 PS 637: Performed by: PSYCHIATRY & NEUROLOGY

## 2020-11-30 PROCEDURE — 36415 COLL VENOUS BLD VENIPUNCTURE: CPT | Performed by: INTERNAL MEDICINE

## 2020-11-30 PROCEDURE — G0177 OPPS/PHP; TRAIN & EDUC SERV: HCPCS

## 2020-11-30 PROCEDURE — 90853 GROUP PSYCHOTHERAPY: CPT

## 2020-11-30 PROCEDURE — H2032 ACTIVITY THERAPY, PER 15 MIN: HCPCS

## 2020-11-30 RX ORDER — FOLIC ACID 1 MG/1
5 TABLET ORAL DAILY
Status: DISCONTINUED | OUTPATIENT
Start: 2020-11-30 | End: 2020-12-04 | Stop reason: HOSPADM

## 2020-11-30 RX ORDER — LIOTHYRONINE SODIUM 25 UG/1
25 TABLET ORAL DAILY
Status: DISCONTINUED | OUTPATIENT
Start: 2020-11-30 | End: 2020-12-04 | Stop reason: HOSPADM

## 2020-11-30 RX ORDER — DIAZEPAM 2 MG
2 TABLET ORAL EVERY 12 HOURS PRN
Status: DISCONTINUED | OUTPATIENT
Start: 2020-11-30 | End: 2020-12-04 | Stop reason: HOSPADM

## 2020-11-30 RX ADMIN — ACETAMINOPHEN 325 MG: 325 TABLET, FILM COATED ORAL at 12:10

## 2020-11-30 RX ADMIN — GUANFACINE 1 MG: 1 TABLET ORAL at 21:56

## 2020-11-30 RX ADMIN — DOCUSATE SODIUM AND SENNOSIDES 1 TABLET: 8.6; 5 TABLET ORAL at 14:55

## 2020-11-30 RX ADMIN — PROPAFENONE HYDROCHLORIDE 150 MG: 150 TABLET, FILM COATED ORAL at 14:10

## 2020-11-30 RX ADMIN — PROPAFENONE HYDROCHLORIDE 150 MG: 150 TABLET, FILM COATED ORAL at 21:56

## 2020-11-30 RX ADMIN — ACETAMINOPHEN 325 MG: 325 TABLET, FILM COATED ORAL at 17:40

## 2020-11-30 RX ADMIN — AZELASTINE HYDROCHLORIDE 1 SPRAY: 137 SPRAY, METERED NASAL at 08:32

## 2020-11-30 RX ADMIN — PREGABALIN 100 MG: 100 CAPSULE ORAL at 08:32

## 2020-11-30 RX ADMIN — FOLIC ACID 5 MG: 1 TABLET ORAL at 12:10

## 2020-11-30 RX ADMIN — PROPAFENONE HYDROCHLORIDE 150 MG: 150 TABLET, FILM COATED ORAL at 06:43

## 2020-11-30 RX ADMIN — CYCLOBENZAPRINE HYDROCHLORIDE 10 MG: 5 TABLET, FILM COATED ORAL at 14:10

## 2020-11-30 RX ADMIN — AZELASTINE HYDROCHLORIDE 1 SPRAY: 137 SPRAY, METERED NASAL at 21:57

## 2020-11-30 RX ADMIN — ACETAMINOPHEN 325 MG: 325 TABLET, FILM COATED ORAL at 23:51

## 2020-11-30 RX ADMIN — PREGABALIN 100 MG: 100 CAPSULE ORAL at 14:10

## 2020-11-30 RX ADMIN — LIOTHYRONINE SODIUM 25 MCG: 25 TABLET ORAL at 12:10

## 2020-11-30 RX ADMIN — CYCLOBENZAPRINE HYDROCHLORIDE 10 MG: 5 TABLET, FILM COATED ORAL at 21:56

## 2020-11-30 RX ADMIN — ACETAMINOPHEN 325 MG: 325 TABLET, FILM COATED ORAL at 06:43

## 2020-11-30 RX ADMIN — PREGABALIN 100 MG: 100 CAPSULE ORAL at 21:56

## 2020-11-30 RX ADMIN — SIMVASTATIN 40 MG: 20 TABLET, FILM COATED ORAL at 21:56

## 2020-11-30 RX ADMIN — CYCLOBENZAPRINE HYDROCHLORIDE 10 MG: 5 TABLET, FILM COATED ORAL at 08:32

## 2020-11-30 RX ADMIN — MIRABEGRON 50 MG: 25 TABLET, FILM COATED, EXTENDED RELEASE ORAL at 17:40

## 2020-11-30 RX ADMIN — VENLAFAXINE HYDROCHLORIDE 300 MG: 150 CAPSULE, EXTENDED RELEASE ORAL at 08:32

## 2020-11-30 RX ADMIN — ACETAMINOPHEN 325 MG: 325 TABLET, FILM COATED ORAL at 00:22

## 2020-11-30 RX ADMIN — LEVOTHYROXINE SODIUM 150 MCG: 75 TABLET ORAL at 08:32

## 2020-11-30 RX ADMIN — DONEPEZIL HYDROCHLORIDE 10 MG: 10 TABLET ORAL at 21:56

## 2020-11-30 ASSESSMENT — ACTIVITIES OF DAILY LIVING (ADL)
HYGIENE/GROOMING: INDEPENDENT
DRESS: INDEPENDENT
ORAL_HYGIENE: INDEPENDENT
LAUNDRY: WITH SUPERVISION

## 2020-11-30 NOTE — PLAN OF CARE
Problem: General Rehab Plan of Care  Goal: Therapeutic Recreation/Music Therapy Goal (Art Therapy)  Description: The patient and/or their representative will achieve their patient-specific goals related to the plan of care.  The patient-specific goals include: emotional expression, identifying positive strengths and goals  Outcome: No Change     Art therapy directive was to create a personal coat of arms/shield of strength using prompts themed around identifying positive aspects of self. Goals of directive: to identify positive strengths and goals, emotional expression, future-focused directive. Pt was a positive participant, focused on task for the full duration of group. Pt identified several positive future goals and personal strengths.  Pts mood was calm, pleasant participant.

## 2020-11-30 NOTE — PLAN OF CARE
Problem: General Plan of Care (Inpatient Behavioral)  Goal: Individualization/Patient Specific Goal (IP Behavioral)  Description: The patient and/or their representative will achieve their patient-specific goals related to the plan of care.    The patient-specific goals include:  Outcome: No Change  Flowsheets (Taken 11/30/2020 3947)  Areas of Vulnerability: Suicide attempt, family conflict in the home  Patient Strengths:   Stable housing   Financial stability   Adherent to medication regime   Awareness of substance use issues    The patient completed the reasons for admit and goals for discharge in the personal plan of care.   Reasons for admit:  1)  Suicide attempt via overdose  2)  Family conflict    Goals for discharge:  1)  Medication management  2)  Improved communication with family

## 2020-11-30 NOTE — PROGRESS NOTES
Midnight INR 5.02, pt informed.  Compliant with taking his scheduled medications, continues to have pain on his left shoulder, Tylenol helpful.  Out in the lounge once for snacks, social with staff and peers.Denies feeling suicidal.  Slept for 7.5 hours

## 2020-11-30 NOTE — PROGRESS NOTES
"   11/30/20 1500   General Information   Date Initially Attended OT 11/30/20   Clinical Impression   Affect Appropriate to situation   Orientation Oriented to person, place and time   Appearance and ADLs General cleanliness observed in most areas   Attention to Internal Stimuli No observed signs   Interaction Skills Interacts appropriately with staff;Interacts appropriately with peers   Ability to Communicate Needs Independent   Verbal Content Clear;Appropriate to topic;Needs further assessment   Ability to Maintain Boundaries Maintains appropriate physical boundaries;Maintains appropriate verbal boundaries   Participation Independently participates   Concentration Concentrates 50 minutes;Needs further assessment   Ability to Concentrate With structure   Follows and Comprehends Directions Independently follows multi-step directions   Memory Needs further assessment   Organization Independently organizes medium tasks   Decision Making Independent   Planning and Problem Solving Needs further assessment   Ability to Apply and Learn Concepts Applies within group structure   Frustrations / Stress Tolerance Independently identifies sources of frustration/stress   Level of Insight Some insight;Needs further assessment   Self Esteem Can identify positives   Social Supports Identifies utilizing supports   General Observation/Plan   General Observations/Plan See Comments   Attended 1 of 2 OT groups, missing the second group while meeting with unit staff.  He was pleasant with others, worked on a task on the iPad requiring complex problem-solving with success and practice.  He stated on the OT goal form reason for admission as problems with family.  A personal strengths he identified was \"organization \".  Changes he hopes for at time of discharge is \"my family \".  He chose the OT goals to focus on to be deal with frustration more effectively, express feelings better, follow directions better, and listen more to other people.Pt " was given and completed a written self assessment. OT purpose was explained with the value of having involvement in treatment plan, and provided options to meet self identified goals.  Order was received for OT cognitive testing.  Patient will be seen early a.m. on December 2.  Plan: Will Provide structure, support, and encouragement. Offer education on coping strategies and life management skills. Assist pt to increase self awareness regarding mental health issues and expand network of stress management sources.  Will assess cognition further.

## 2020-11-30 NOTE — PROGRESS NOTES
Pt stated he discussed with provider staying on unit [facility] until Friday. Pt stated he agrees to this proposition.

## 2020-11-30 NOTE — PROGRESS NOTES
"Patient seen via telemedicine.  Care discussed with treatment team staff.  Call to spouse, she is afraid of his reactions and poor problem solving skills.  Blood pressure (!) 145/91, pulse 74, temperature 97.2  F (36.2  C), temperature source Temporal, resp. rate 16, height 1.854 m (6' 1\"), weight 140.3 kg (309 lb 3.2 oz), SpO2 96 %.    By report, calm, cooperative, slept.      General appearance: good  Alert.   Affect: fair  Mood: fair    Speech:  Circumstantial, production a bit increased, no pressure.  Some word finding problems..   Eye contact:  good.    Psychomotor behavior: normal  Gait: normal.    Abnormal movements: none  Delusions: none  Hallucinations:  none  Thoughts: circumstantial  Associations: intact  Judgement: poor  Insight:poor  Cognitions: intact in conversation (see below)  Memory:  intact in conversation (see below)  Orientation: normal    Not suicidal.  He reports family history of depression in mother and sister, alzheimer's disease in mother and brother.  Nicko is on Aricept for memory concerns.  He has past meds of Gabapentin (caused violent feelings twice), and Valium is for muscle stiffness.    Imp:  MDD  MCI likely    Plan: Add Folic Acid and Cytomel  2.  OT testing  3.  Decrease Valium due to memory issues and dis-inhibition possible      Current Facility-Administered Medications:      acetaminophen (TYLENOL) tablet 325 mg, 325 mg, Oral, Q6H, Naegele, Debra Ann, APRN CNS, 325 mg at 11/30/20 1210     alum & mag hydroxide-simethicone (MAALOX) suspension 30 mL, 30 mL, Oral, Q4H PRN, Naegele, Debra Ann, APRN CNS     azelastine (ASTELIN) nasal spray 1 spray, 1 spray, Both Nostrils, BID, Naegele, Debra Ann, APRN CNS, 1 spray at 11/30/20 0832     cyclobenzaprine (FLEXERIL) tablet 10 mg, 10 mg, Oral, TID, Naegele, Debra Ann, APRN CNS, 10 mg at 11/30/20 0832     diazepam (VALIUM) tablet 5 mg, 5 mg, Oral, Q8H PRN, Naegele, Debra Ann, APRN CNS     donepezil (ARICEPT) tablet 10 mg, 10 mg, Oral, At " Bedtime, Naegele, Debra Ann, APRN CNS, 10 mg at 11/29/20 2115     folic acid (FOLVITE) tablet 5 mg, 5 mg, Oral, Daily, Aurelio Veronica MD, 5 mg at 11/30/20 1210     guanFACINE (TENEX) tablet 1 mg, 1 mg, Oral, At Bedtime, Naegele, Debra Ann, APRN CNS, 1 mg at 11/29/20 2115     HOLD: warfarin (COUMADIN) therapy, , Does not apply, HOLD, Maurice Snow PA-C     hydrOXYzine (ATARAX) tablet 25 mg, 25 mg, Oral, Q4H PRN, Naegele, Debra Ann, APRN CNS, 25 mg at 11/27/20 2340     levothyroxine (SYNTHROID/LEVOTHROID) tablet 150 mcg, 150 mcg, Oral, Daily, Naegele, Debra Ann, APRN CNS, 150 mcg at 11/30/20 0832     liothyronine (CYTOMEL) tablet 25 mcg, 25 mcg, Oral, Daily, Aurelio Veronica MD, 25 mcg at 11/30/20 1210     melatonin tablet 3 mg, 3 mg, Oral, At Bedtime PRN, Naegele, Debra Ann, APRN CNS, 3 mg at 11/27/20 2340     mirabegron (MYRBETRIQ) 24 hr tablet 50 mg, 50 mg, Oral, Daily, Naegele, Debra Ann, APRN CNS, 50 mg at 11/29/20 1829     pregabalin (LYRICA) capsule 100 mg, 100 mg, Oral, TID, Naegele, Debra Ann, APRN CNS, 100 mg at 11/30/20 0832     propafenone (RYTHMOL) tablet 150 mg, 150 mg, Oral, Q8H, Naegele, Debra Ann, APRN CNS, 150 mg at 11/30/20 0643     senna-docusate (SENOKOT-S/PERICOLACE) 8.6-50 MG per tablet 1 tablet, 1 tablet, Oral, BID PRN, Naegele, Debra Ann, APRN CNS     simvastatin (ZOCOR) tablet 40 mg, 40 mg, Oral, At Bedtime, Naegele, Debra Ann, APRN CNS, 40 mg at 11/29/20 2114     venlafaxine (EFFEXOR-XR) 24 hr capsule 300 mg, 300 mg, Oral, Daily with breakfast, Naegele, Debra Ann, APRN CNS, 300 mg at 11/30/20 0832  Recent Results (from the past 168 hour(s))   EKG 12-lead, tracing only    Collection Time: 11/27/20  3:46 PM   Result Value Ref Range    Interpretation ECG Click View Image link to view waveform and result    CBC with platelets differential    Collection Time: 11/27/20  3:52 PM   Result Value Ref Range    WBC 6.9 4.0 - 11.0 10e9/L    RBC Count 5.53 4.4 - 5.9 10e12/L    Hemoglobin 17.9 (H)  13.3 - 17.7 g/dL    Hematocrit 51.1 40.0 - 53.0 %    MCV 92 78 - 100 fl    MCH 32.4 26.5 - 33.0 pg    MCHC 35.0 31.5 - 36.5 g/dL    RDW 13.1 10.0 - 15.0 %    Platelet Count 222 150 - 450 10e9/L    Diff Method Automated Method     % Neutrophils 68.9 %    % Lymphocytes 19.0 %    % Monocytes 9.6 %    % Eosinophils 1.5 %    % Basophils 0.6 %    % Immature Granulocytes 0.4 %    Nucleated RBCs 0 0 /100    Absolute Neutrophil 4.7 1.6 - 8.3 10e9/L    Absolute Lymphocytes 1.3 0.8 - 5.3 10e9/L    Absolute Monocytes 0.7 0.0 - 1.3 10e9/L    Absolute Eosinophils 0.1 0.0 - 0.7 10e9/L    Absolute Basophils 0.0 0.0 - 0.2 10e9/L    Abs Immature Granulocytes 0.0 0 - 0.4 10e9/L    Absolute Nucleated RBC 0.0    INR    Collection Time: 11/27/20  3:52 PM   Result Value Ref Range    INR 2.87 (H) 0.86 - 1.14   Comprehensive metabolic panel    Collection Time: 11/27/20  3:52 PM   Result Value Ref Range    Sodium 139 133 - 144 mmol/L    Potassium 3.8 3.4 - 5.3 mmol/L    Chloride 105 94 - 109 mmol/L    Carbon Dioxide 30 20 - 32 mmol/L    Anion Gap 4 3 - 14 mmol/L    Glucose 129 (H) 70 - 99 mg/dL    Urea Nitrogen 16 7 - 30 mg/dL    Creatinine 0.98 0.66 - 1.25 mg/dL    GFR Estimate 82 >60 mL/min/[1.73_m2]    GFR Estimate If Black >90 >60 mL/min/[1.73_m2]    Calcium 8.4 (L) 8.5 - 10.1 mg/dL    Bilirubin Total 1.7 (H) 0.2 - 1.3 mg/dL    Albumin 3.6 3.4 - 5.0 g/dL    Protein Total 7.0 6.8 - 8.8 g/dL    Alkaline Phosphatase 85 40 - 150 U/L    ALT 39 0 - 70 U/L    AST 28 0 - 45 U/L   Troponin I    Collection Time: 11/27/20  3:52 PM   Result Value Ref Range    Troponin I ES <0.015 0.000 - 0.045 ug/L   CRP inflammation    Collection Time: 11/27/20  3:52 PM   Result Value Ref Range    CRP Inflammation 2.9 0.0 - 8.0 mg/L   Alcohol ethyl    Collection Time: 11/27/20  3:52 PM   Result Value Ref Range    Ethanol g/dL <0.01 <0.01 g/dL   Acetaminophen level    Collection Time: 11/27/20  3:52 PM   Result Value Ref Range    Acetaminophen Level <2 mg/L    Salicylate level    Collection Time: 11/27/20  3:52 PM   Result Value Ref Range    Salicylate Level <2 mg/dL   UA with Microscopic    Collection Time: 11/27/20  6:05 PM   Result Value Ref Range    Color Urine Yellow     Appearance Urine Clear     Glucose Urine Negative NEG^Negative mg/dL    Bilirubin Urine Negative NEG^Negative    Ketones Urine Negative NEG^Negative mg/dL    Specific Gravity Urine 1.016 1.003 - 1.035    Blood Urine Trace (A) NEG^Negative    pH Urine 5.5 5.0 - 7.0 pH    Protein Albumin Urine 10 (A) NEG^Negative mg/dL    Urobilinogen mg/dL Normal 0.0 - 2.0 mg/dL    Nitrite Urine Negative NEG^Negative    Leukocyte Esterase Urine Negative NEG^Negative    Source Midstream Urine     WBC Urine 1 0 - 5 /HPF    RBC Urine 3 (H) 0 - 2 /HPF    Squamous Epithelial /HPF Urine <1 0 - 1 /HPF    Mucous Urine Present (A) NEG^Negative /LPF   Drug abuse screen 6 urine (chem dep)    Collection Time: 11/27/20  6:05 PM   Result Value Ref Range    Amphetamine Qual Urine Negative NEG^Negative    Barbiturates Qual Urine Negative NEG^Negative    Benzodiazepine Qual Urine Positive (A) NEG^Negative    Cannabinoids Qual Urine Negative NEG^Negative    Cocaine Qual Urine Negative NEG^Negative    Ethanol Qual Urine Negative NEG^Negative    Opiates Qualitative Urine Negative NEG^Negative   Asymptomatic COVID-19 Virus (Coronavirus) by PCR    Collection Time: 11/27/20  8:04 PM    Specimen: Nasopharyngeal   Result Value Ref Range    COVID-19 Virus PCR to [facility] - Source Nasopharyngeal     COVID-19 Virus PCR to [facility] - Result       Test received-See reflex to IDDL test SARS CoV2 (COVID-19) Virus RT-PCR   SARS-CoV-2 COVID-19 Virus (Coronavirus) RT-PCR Nasopharyngeal    Collection Time: 11/27/20  8:04 PM    Specimen: Nasopharyngeal   Result Value Ref Range    SARS-CoV-2 Virus Specimen Source Nasopharyngeal     SARS-CoV-2 PCR Result NEGATIVE     SARS-CoV-2 PCR Comment       Testing was performed using the Aptima SARS-CoV-2 Assay on  the OLED-T Instrument System.   Additional information about this Emergency Use Authorization (EUA) assay can be found via   the Lab Guide.     INR    Collection Time: 11/27/20 11:42 PM   Result Value Ref Range    INR 3.78 (H) 0.86 - 1.14   INR    Collection Time: 11/28/20  7:48 AM   Result Value Ref Range    INR 5.71 (HH) 0.86 - 1.14   INR    Collection Time: 11/28/20  4:02 PM   Result Value Ref Range    INR 8.73 (HH) 0.86 - 1.14   INR    Collection Time: 11/28/20 10:46 PM   Result Value Ref Range    INR >10.00 (HH) 0.86 - 1.14   INR    Collection Time: 11/29/20  6:21 AM   Result Value Ref Range    INR 5.25 (HH) 0.86 - 1.14   INR    Collection Time: 11/29/20 12:34 PM   Result Value Ref Range    INR 4.21 (H) 0.86 - 1.14   INR    Collection Time: 11/29/20  6:39 PM   Result Value Ref Range    INR 4.30 (H) 0.86 - 1.14   INR    Collection Time: 11/30/20 12:44 AM   Result Value Ref Range    INR 5.02 (HH) 0.86 - 1.14   INR    Collection Time: 11/30/20  7:48 AM   Result Value Ref Range    INR 5.11 (HH) 0.86 - 1.14       Video-Visit Details    Type of service:  Video Visit    Video Start Time (time video started): 1140    Video End Time (time video stopped): 1200    Originating Location (pt. Location): [facility]    Distant Location (provider location): Provider remote location    Mode of Communication:  Video Conference via [employer]    Physician has received verbal consent for a Video Visit from the patient? Yes      Aurelio Veronica MD

## 2020-11-30 NOTE — PLAN OF CARE
Problem: General Plan of Care (Inpatient Behavioral)  Goal: Team Discussion  Description: Team Plan:  Outcome: No Change  Note: BEHAVIORAL TEAM DISCUSSION    Participants: Dr. Aurelio Veronica MD, Anjali HERRING, Aracelis Zuniga RN, Angely Barron OT  Progress: New admit  Anticipated length of stay: 5-7 days  Continued Stay Criteria/Rationale: Suicide attempt  Medical/Physical: Hyperlipidemia, atrial fibrillation, obesity  Precautions:   Behavioral Orders   Procedures    Code 1 - Restrict to Unit    Fall precautions     Patient reported feeling unsteady on his feet    Routine Programming     As clinically indicated    Self Injury Precaution    Status 15     Every 15 minutes.    Suicide precautions     Patients on Suicide Precautions should have a Combination Diet ordered that includes a Diet selection(s) AND a Behavioral Tray selection for Safe Tray - with utensils, or Safe Tray - NO utensils       Plan: Psychiatric Assessment. Medication Management. Therapeutic Mileu. Individual and group support.   Rationale for change in precautions or plan: Initial plan

## 2020-11-30 NOTE — PLAN OF CARE
Nicko participated in Music Therapy group. He experiences some hearing loss issues, but was able to adapt with the use of a unit microphone with earphones, and participate in the MT group today.  He tended to talk during the music, needing some gentle redirection to wait until after the song was finished.  He appears to enjoy being a part of the group and using humor when he can.  He was calm and attentive throughout.  Goals of session were to foster a sense of validation belonging (social cohesion), as well as relaxation and self-expression.  His affect was pleasant.

## 2020-11-30 NOTE — PLAN OF CARE
Work Completed: The patient's care was discussed with the treatment team and chart notes were reviewed. Met with patient to complete personal plan of care. Patient was very talkative and shared at length about family problems with wife, daughter and grandson. He requested worksheets/brochres that would help with family communication.    Discharge plan or goal: Discharge home when stable.                Barriers to discharge: Patient needs medication adjustments for further stabilization.

## 2020-11-30 NOTE — PLAN OF CARE
Patient was calm, social with peers and attending group activities. He denies anxiety, depression and SI/SIB.

## 2020-12-01 LAB
INR PPP: 7.6 (ref 0.86–1.14)
INR PPP: 7.63 (ref 0.86–1.14)
INR PPP: 7.86 (ref 0.86–1.14)
INR PPP: 8.78 (ref 0.86–1.14)

## 2020-12-01 PROCEDURE — 250N000013 HC RX MED GY IP 250 OP 250 PS 637: Performed by: CLINICAL NURSE SPECIALIST

## 2020-12-01 PROCEDURE — 124N000003 HC R&B MH SENIOR/ADOLESCENT

## 2020-12-01 PROCEDURE — 85610 PROTHROMBIN TIME: CPT | Performed by: INTERNAL MEDICINE

## 2020-12-01 PROCEDURE — 36415 COLL VENOUS BLD VENIPUNCTURE: CPT | Performed by: INTERNAL MEDICINE

## 2020-12-01 PROCEDURE — 80338 ANTIDEPRESSANT NOT SPECIFIED: CPT | Performed by: INTERNAL MEDICINE

## 2020-12-01 PROCEDURE — 250N000013 HC RX MED GY IP 250 OP 250 PS 637: Performed by: PSYCHIATRY & NEUROLOGY

## 2020-12-01 PROCEDURE — G0177 OPPS/PHP; TRAIN & EDUC SERV: HCPCS

## 2020-12-01 PROCEDURE — H2032 ACTIVITY THERAPY, PER 15 MIN: HCPCS

## 2020-12-01 RX ADMIN — PREGABALIN 100 MG: 100 CAPSULE ORAL at 21:09

## 2020-12-01 RX ADMIN — ACETAMINOPHEN 325 MG: 325 TABLET, FILM COATED ORAL at 06:23

## 2020-12-01 RX ADMIN — SIMVASTATIN 40 MG: 20 TABLET, FILM COATED ORAL at 21:10

## 2020-12-01 RX ADMIN — ACETAMINOPHEN 325 MG: 325 TABLET, FILM COATED ORAL at 12:06

## 2020-12-01 RX ADMIN — LEVOTHYROXINE SODIUM 150 MCG: 75 TABLET ORAL at 08:00

## 2020-12-01 RX ADMIN — ACETAMINOPHEN 325 MG: 325 TABLET, FILM COATED ORAL at 17:31

## 2020-12-01 RX ADMIN — AZELASTINE HYDROCHLORIDE 1 SPRAY: 137 SPRAY, METERED NASAL at 08:00

## 2020-12-01 RX ADMIN — PREGABALIN 100 MG: 100 CAPSULE ORAL at 14:52

## 2020-12-01 RX ADMIN — AZELASTINE HYDROCHLORIDE 1 SPRAY: 137 SPRAY, METERED NASAL at 21:10

## 2020-12-01 RX ADMIN — CYCLOBENZAPRINE HYDROCHLORIDE 10 MG: 5 TABLET, FILM COATED ORAL at 14:52

## 2020-12-01 RX ADMIN — PROPAFENONE HYDROCHLORIDE 150 MG: 150 TABLET, FILM COATED ORAL at 21:10

## 2020-12-01 RX ADMIN — VENLAFAXINE HYDROCHLORIDE 300 MG: 150 CAPSULE, EXTENDED RELEASE ORAL at 08:00

## 2020-12-01 RX ADMIN — FOLIC ACID 5 MG: 1 TABLET ORAL at 08:00

## 2020-12-01 RX ADMIN — PREGABALIN 100 MG: 100 CAPSULE ORAL at 08:00

## 2020-12-01 RX ADMIN — MIRABEGRON 50 MG: 25 TABLET, FILM COATED, EXTENDED RELEASE ORAL at 17:31

## 2020-12-01 RX ADMIN — GUANFACINE 1 MG: 1 TABLET ORAL at 21:08

## 2020-12-01 RX ADMIN — DONEPEZIL HYDROCHLORIDE 10 MG: 10 TABLET ORAL at 21:09

## 2020-12-01 RX ADMIN — PROPAFENONE HYDROCHLORIDE 150 MG: 150 TABLET, FILM COATED ORAL at 06:23

## 2020-12-01 RX ADMIN — DIAZEPAM 2 MG: 2 TABLET ORAL at 12:08

## 2020-12-01 RX ADMIN — PROPAFENONE HYDROCHLORIDE 150 MG: 150 TABLET, FILM COATED ORAL at 14:52

## 2020-12-01 RX ADMIN — CYCLOBENZAPRINE HYDROCHLORIDE 10 MG: 5 TABLET, FILM COATED ORAL at 21:10

## 2020-12-01 RX ADMIN — CYCLOBENZAPRINE HYDROCHLORIDE 10 MG: 5 TABLET, FILM COATED ORAL at 08:00

## 2020-12-01 RX ADMIN — LIOTHYRONINE SODIUM 25 MCG: 25 TABLET ORAL at 08:00

## 2020-12-01 ASSESSMENT — MIFFLIN-ST. JEOR: SCORE: 2270.45

## 2020-12-01 ASSESSMENT — ACTIVITIES OF DAILY LIVING (ADL)
HYGIENE/GROOMING: INDEPENDENT
ORAL_HYGIENE: INDEPENDENT
LAUNDRY: WITH SUPERVISION
DRESS: INDEPENDENT

## 2020-12-01 NOTE — PLAN OF CARE
Pt is calm and cooperative.  Denies all mental health symptoms.  Denies SI/SIB, passive or active.  Appears somewhat superficial when answering questions - and then quickly changes the topic.  Speech is rambling.  Visible in the milieu.  Attended and participated in evening group.

## 2020-12-01 NOTE — PROGRESS NOTES
Midnight INR 7.6  No signs of bleeding. Vital signs stable  Slept for 6 hours overnight  Calm and cooperative.

## 2020-12-01 NOTE — PROGRESS NOTES
INR trending up:  6.6 at 1841.  House officer and Poison Control notified.  Continue to monitor.  Vitamin K given when INR is over 9  Poison control will follow-up tomorrow morning.     Pt not exhibiting s/s of bleeding

## 2020-12-01 NOTE — PLAN OF CARE
Work Completed: The patient's care was discussed with the treatment team and chart notes were reviewed. Patient signed in voluntary yesterday and provider removed the 72 hour hold. Patient agrees to stay at hosptial until Friday.    Discharge plan or goal: Discharge home when stable                Barriers to discharge: Patient is not ready to discharge home safely. He lacks insight and needs cognitive testing.

## 2020-12-01 NOTE — PROGRESS NOTES
Brief Medicine Note    Internal medicine following for elevated INR.  INR has been trended q 6 hours per poison control and pharmacy.  Currently stable 7.86.   - Will not treat with vitamin K unless >10   - Cont to trend q 6 hours   - Monitor closely for any s/s of bleeding     Lizzeth Lamb, MPH, [facility]  Hospitalist Service  Pager [phone]

## 2020-12-01 NOTE — PLAN OF CARE
Problem: OT General Care Plan  Goal: OT Goal 1  Description: Will attend OT groups,  and identify areas of cognition abilities in problem solving and thought organization.     Pt attended 1 out of 3 OT groups offered. Pt actively participated in a mental health management group with a focus on coping through movement to facilitate relaxation and stress management via chair yoga. Pt followed and engaged in about 75% of the yoga poses, and appeared calm at the end of group. Pleasant, cooperative, and engaged.

## 2020-12-01 NOTE — PROGRESS NOTES
11/30/20 2200   Group Therapy Session   Group Attendance  attended session   Time Session Began 1900   Time Session Ended 2000   Total Time (minutes) 1   Group Type psychotherapeutic   Group Topic Covered Emotions/expression  Self esteem/ strengths   Patient Participation/Contribution agreeable to participate   The  Psychotherapy group goal is to promote insight to positive choice and change. Group processing is within a supportive and safe environment. Patients will process emotions using verbal group and expressive psychotherapy interventions including visual art/writing interventions.     Group interventions support patients by : cultivating resilience, fostering self awareness, self expression, self esteem and self compassion.  Groups will provide tools to encourage self and others in group, to practice communication/ social skills and supports, learn positive coping mechanisms, self efficacy, conflict resolution, empowerment, optimism ,hope , understanding ones emotions,and a sense of self and community.  Tools will be provided to manage life stressors  and individual's diagnosis      Modalities to reach these goals include: positive and solution focused psychology, CBT, DBT, ACT, Narrative psychology, Adlerian psychology, FLOW, Expressive Arts Continuum Therapies( Art Therapy) and Mindfulness based directives and discussions.        Subjective -mood report- better each day     Objective/ Intervention- Goal of group and Therapeutic modality utilized-Self esteem/ Strengths     Patient Response-     Pt was engaged and cooperative. He did a thoughtful   written worksheet on strengths and encouragement. He was conversational and social, at times he would need slight redirection for conversational boundaries.    Angelito Morales, SHAN, ATR

## 2020-12-01 NOTE — PROGRESS NOTES
"Patient seen via telemedicine.  Care discussed with treatment team staff.  Blood pressure (!) 159/102, pulse 83, temperature 97.2  F (36.2  C), temperature source Temporal, resp. rate 16, height 1.854 m (6' 1\"), weight 142.2 kg (313 lb 6.4 oz), SpO2 96 %.    By report, denies difficulties, slept well, INR elevated again at 7.63    General appearance: good  Alert.   Affect: good  Mood: good, he feels it is improved  Speech:  normal.   Eye contact:  good.    Psychomotor behavior: normal  Gait: normal.    Abnormal movements: none  Delusions: none  Hallucinations:  none  Thoughts: logical  Associations: intact  Judgement:  fair  Insight: fair  Cognitions: intact in conversation  Memory:  intact in conversation  Orientation: normal    Not suicidal.    Plan: cognitive testing  Same meds      Current Facility-Administered Medications:      acetaminophen (TYLENOL) tablet 325 mg, 325 mg, Oral, Q6H, Naegele, Debra Ann, APRN CNS, 325 mg at 12/01/20 1206     alum & mag hydroxide-simethicone (MAALOX) suspension 30 mL, 30 mL, Oral, Q4H PRN, Naegele, Debra Ann, APRN CNS     azelastine (ASTELIN) nasal spray 1 spray, 1 spray, Both Nostrils, BID, Naegele, Debra Ann, APRN CNS, 1 spray at 12/01/20 0800     cyclobenzaprine (FLEXERIL) tablet 10 mg, 10 mg, Oral, TID, Naegele, Debra Ann, APRN CNS, 10 mg at 12/01/20 0800     diazepam (VALIUM) tablet 2 mg, 2 mg, Oral, Q12H PRN, Aurelio Veronica MD, 2 mg at 12/01/20 1208     donepezil (ARICEPT) tablet 10 mg, 10 mg, Oral, At Bedtime, Naegele, Debra Ann, APRN CNS, 10 mg at 11/30/20 2156     folic acid (FOLVITE) tablet 5 mg, 5 mg, Oral, Daily, Aurelio Veronica MD, 5 mg at 12/01/20 0800     guanFACINE (TENEX) tablet 1 mg, 1 mg, Oral, At Bedtime, Naegele, Debra Ann, APRN CNS, 1 mg at 11/30/20 0824     HOLD: warfarin (COUMADIN) therapy, , Does not apply, HOLD, Maurice Snow PA-C     hydrOXYzine (ATARAX) tablet 25 mg, 25 mg, Oral, Q4H PRN, Naegele, Debra Ann, AMARIS CNS, 25 mg at 11/27/20 6640    "  levothyroxine (SYNTHROID/LEVOTHROID) tablet 150 mcg, 150 mcg, Oral, Daily, Naegele, Debra Ann, APRN CNS, 150 mcg at 12/01/20 0800     liothyronine (CYTOMEL) tablet 25 mcg, 25 mcg, Oral, Daily, Aurelio Veronica MD, 25 mcg at 12/01/20 0800     melatonin tablet 3 mg, 3 mg, Oral, At Bedtime PRN, Naegele, Debra Ann, APRN CNS, 3 mg at 11/27/20 2340     mirabegron (MYRBETRIQ) 24 hr tablet 50 mg, 50 mg, Oral, Daily, Naegele, Debra Ann, APRN CNS, 50 mg at 11/30/20 1740     pregabalin (LYRICA) capsule 100 mg, 100 mg, Oral, TID, Naegele, Debra Ann, APRN CNS, 100 mg at 12/01/20 0800     propafenone (RYTHMOL) tablet 150 mg, 150 mg, Oral, Q8H, Naegele, Debra Ann, APRN CNS, 150 mg at 12/01/20 0623     senna-docusate (SENOKOT-S/PERICOLACE) 8.6-50 MG per tablet 1 tablet, 1 tablet, Oral, BID PRN, Naegele, Debra Ann, APRN CNS, 1 tablet at 11/30/20 1455     simvastatin (ZOCOR) tablet 40 mg, 40 mg, Oral, At Bedtime, Naegele, Debra Ann, APRN CNS, 40 mg at 11/30/20 2156     venlafaxine (EFFEXOR-XR) 24 hr capsule 300 mg, 300 mg, Oral, Daily with breakfast, Naegele, Debra Ann, APRN CNS, 300 mg at 12/01/20 0800  Recent Results (from the past 168 hour(s))   EKG 12-lead, tracing only    Collection Time: 11/27/20  3:46 PM   Result Value Ref Range    Interpretation ECG Click View Image link to view waveform and result    CBC with platelets differential    Collection Time: 11/27/20  3:52 PM   Result Value Ref Range    WBC 6.9 4.0 - 11.0 10e9/L    RBC Count 5.53 4.4 - 5.9 10e12/L    Hemoglobin 17.9 (H) 13.3 - 17.7 g/dL    Hematocrit 51.1 40.0 - 53.0 %    MCV 92 78 - 100 fl    MCH 32.4 26.5 - 33.0 pg    MCHC 35.0 31.5 - 36.5 g/dL    RDW 13.1 10.0 - 15.0 %    Platelet Count 222 150 - 450 10e9/L    Diff Method Automated Method     % Neutrophils 68.9 %    % Lymphocytes 19.0 %    % Monocytes 9.6 %    % Eosinophils 1.5 %    % Basophils 0.6 %    % Immature Granulocytes 0.4 %    Nucleated RBCs 0 0 /100    Absolute Neutrophil 4.7 1.6 - 8.3 10e9/L     Absolute Lymphocytes 1.3 0.8 - 5.3 10e9/L    Absolute Monocytes 0.7 0.0 - 1.3 10e9/L    Absolute Eosinophils 0.1 0.0 - 0.7 10e9/L    Absolute Basophils 0.0 0.0 - 0.2 10e9/L    Abs Immature Granulocytes 0.0 0 - 0.4 10e9/L    Absolute Nucleated RBC 0.0    INR    Collection Time: 11/27/20  3:52 PM   Result Value Ref Range    INR 2.87 (H) 0.86 - 1.14   Comprehensive metabolic panel    Collection Time: 11/27/20  3:52 PM   Result Value Ref Range    Sodium 139 133 - 144 mmol/L    Potassium 3.8 3.4 - 5.3 mmol/L    Chloride 105 94 - 109 mmol/L    Carbon Dioxide 30 20 - 32 mmol/L    Anion Gap 4 3 - 14 mmol/L    Glucose 129 (H) 70 - 99 mg/dL    Urea Nitrogen 16 7 - 30 mg/dL    Creatinine 0.98 0.66 - 1.25 mg/dL    GFR Estimate 82 >60 mL/min/[1.73_m2]    GFR Estimate If Black >90 >60 mL/min/[1.73_m2]    Calcium 8.4 (L) 8.5 - 10.1 mg/dL    Bilirubin Total 1.7 (H) 0.2 - 1.3 mg/dL    Albumin 3.6 3.4 - 5.0 g/dL    Protein Total 7.0 6.8 - 8.8 g/dL    Alkaline Phosphatase 85 40 - 150 U/L    ALT 39 0 - 70 U/L    AST 28 0 - 45 U/L   Troponin I    Collection Time: 11/27/20  3:52 PM   Result Value Ref Range    Troponin I ES <0.015 0.000 - 0.045 ug/L   CRP inflammation    Collection Time: 11/27/20  3:52 PM   Result Value Ref Range    CRP Inflammation 2.9 0.0 - 8.0 mg/L   Alcohol ethyl    Collection Time: 11/27/20  3:52 PM   Result Value Ref Range    Ethanol g/dL <0.01 <0.01 g/dL   Acetaminophen level    Collection Time: 11/27/20  3:52 PM   Result Value Ref Range    Acetaminophen Level <2 mg/L   Salicylate level    Collection Time: 11/27/20  3:52 PM   Result Value Ref Range    Salicylate Level <2 mg/dL   UA with Microscopic    Collection Time: 11/27/20  6:05 PM   Result Value Ref Range    Color Urine Yellow     Appearance Urine Clear     Glucose Urine Negative NEG^Negative mg/dL    Bilirubin Urine Negative NEG^Negative    Ketones Urine Negative NEG^Negative mg/dL    Specific Gravity Urine 1.016 1.003 - 1.035    Blood Urine Trace (A)  NEG^Negative    pH Urine 5.5 5.0 - 7.0 pH    Protein Albumin Urine 10 (A) NEG^Negative mg/dL    Urobilinogen mg/dL Normal 0.0 - 2.0 mg/dL    Nitrite Urine Negative NEG^Negative    Leukocyte Esterase Urine Negative NEG^Negative    Source Midstream Urine     WBC Urine 1 0 - 5 /HPF    RBC Urine 3 (H) 0 - 2 /HPF    Squamous Epithelial /HPF Urine <1 0 - 1 /HPF    Mucous Urine Present (A) NEG^Negative /LPF   Drug abuse screen 6 urine (chem dep)    Collection Time: 11/27/20  6:05 PM   Result Value Ref Range    Amphetamine Qual Urine Negative NEG^Negative    Barbiturates Qual Urine Negative NEG^Negative    Benzodiazepine Qual Urine Positive (A) NEG^Negative    Cannabinoids Qual Urine Negative NEG^Negative    Cocaine Qual Urine Negative NEG^Negative    Ethanol Qual Urine Negative NEG^Negative    Opiates Qualitative Urine Negative NEG^Negative   Asymptomatic COVID-19 Virus (Coronavirus) by PCR    Collection Time: 11/27/20  8:04 PM    Specimen: Nasopharyngeal   Result Value Ref Range    COVID-19 Virus PCR to [facility] - Source Nasopharyngeal     COVID-19 Virus PCR to [facility] - Result       Test received-See reflex to IDDL test SARS CoV2 (COVID-19) Virus RT-PCR   SARS-CoV-2 COVID-19 Virus (Coronavirus) RT-PCR Nasopharyngeal    Collection Time: 11/27/20  8:04 PM    Specimen: Nasopharyngeal   Result Value Ref Range    SARS-CoV-2 Virus Specimen Source Nasopharyngeal     SARS-CoV-2 PCR Result NEGATIVE     SARS-CoV-2 PCR Comment       Testing was performed using the Aptima SARS-CoV-2 Assay on the [employer] Instrument System.   Additional information about this Emergency Use Authorization (EUA) assay can be found via   the Lab Guide.     INR    Collection Time: 11/27/20 11:42 PM   Result Value Ref Range    INR 3.78 (H) 0.86 - 1.14   INR    Collection Time: 11/28/20  7:48 AM   Result Value Ref Range    INR 5.71 (HH) 0.86 - 1.14   INR    Collection Time: 11/28/20  4:02 PM   Result Value Ref Range    INR 8.73 (HH) 0.86 - 1.14   INR     Collection Time: 11/28/20 10:46 PM   Result Value Ref Range    INR >10.00 (HH) 0.86 - 1.14   INR    Collection Time: 11/29/20  6:21 AM   Result Value Ref Range    INR 5.25 (HH) 0.86 - 1.14   INR    Collection Time: 11/29/20 12:34 PM   Result Value Ref Range    INR 4.21 (H) 0.86 - 1.14   INR    Collection Time: 11/29/20  6:39 PM   Result Value Ref Range    INR 4.30 (H) 0.86 - 1.14   INR    Collection Time: 11/30/20 12:44 AM   Result Value Ref Range    INR 5.02 (HH) 0.86 - 1.14   INR    Collection Time: 11/30/20  7:48 AM   Result Value Ref Range    INR 5.11 (HH) 0.86 - 1.14   INR    Collection Time: 11/30/20 12:47 PM   Result Value Ref Range    INR 5.40 (HH) 0.86 - 1.14   INR    Collection Time: 11/30/20  6:41 PM   Result Value Ref Range    INR 6.60 (HH) 0.86 - 1.14   TSH with free T4 reflex    Collection Time: 11/30/20  6:41 PM   Result Value Ref Range    TSH 2.17 0.40 - 4.00 mU/L   INR    Collection Time: 11/30/20 11:57 PM   Result Value Ref Range    INR 7.60 (HH) 0.86 - 1.14   INR    Collection Time: 12/01/20  6:37 AM   Result Value Ref Range    INR 7.63 (HH) 0.86 - 1.14   INR    Collection Time: 12/01/20 12:31 PM   Result Value Ref Range    INR 7.86 (HH) 0.86 - 1.14       Video-Visit Details    Type of service:  Video Visit    Video Start Time (time video started): 1000    Video End Time (time video stopped): 1020    Originating Location (pt. Location): [facility]    Distant Location (provider location): Provider remote location    Mode of Communication:  Video Conference via [employer]    Physician has received verbal consent for a Video Visit from the patient? Yes      Aurelio Veronica MD

## 2020-12-01 NOTE — PLAN OF CARE
Patient is social, calm, attending and participating in group activities. He reports being in a good mood, denies anxiety, depression and SI/SIB.     Pt's INR today were 7.63 at 0637 and 7.86 at 1231. Internal medicine Lizzeth Lamb was notified of results.

## 2020-12-02 ENCOUNTER — DOCUMENTATION ONLY (OUTPATIENT)
Dept: BEHAVIORAL HEALTH | Facility: CLINIC | Age: 65
End: 2020-12-02

## 2020-12-02 LAB
INR PPP: 7.28 (ref 0.86–1.14)
INR PPP: 7.28 (ref 0.86–1.14)
INR PPP: 8.41 (ref 0.86–1.14)

## 2020-12-02 PROCEDURE — 85610 PROTHROMBIN TIME: CPT | Performed by: INTERNAL MEDICINE

## 2020-12-02 PROCEDURE — 250N000013 HC RX MED GY IP 250 OP 250 PS 637: Performed by: CLINICAL NURSE SPECIALIST

## 2020-12-02 PROCEDURE — 36415 COLL VENOUS BLD VENIPUNCTURE: CPT | Performed by: INTERNAL MEDICINE

## 2020-12-02 PROCEDURE — 250N000013 HC RX MED GY IP 250 OP 250 PS 637: Performed by: PSYCHIATRY & NEUROLOGY

## 2020-12-02 PROCEDURE — G0177 OPPS/PHP; TRAIN & EDUC SERV: HCPCS

## 2020-12-02 PROCEDURE — 36415 COLL VENOUS BLD VENIPUNCTURE: CPT | Performed by: NURSE PRACTITIONER

## 2020-12-02 PROCEDURE — 96125 COGNITIVE TEST BY HC PRO: CPT | Mod: GO

## 2020-12-02 PROCEDURE — 124N000003 HC R&B MH SENIOR/ADOLESCENT

## 2020-12-02 PROCEDURE — 85610 PROTHROMBIN TIME: CPT | Performed by: NURSE PRACTITIONER

## 2020-12-02 RX ORDER — MEMANTINE HYDROCHLORIDE 5 MG/1
5 TABLET ORAL DAILY
Status: DISCONTINUED | OUTPATIENT
Start: 2020-12-02 | End: 2020-12-04 | Stop reason: HOSPADM

## 2020-12-02 RX ORDER — LANOLIN ALCOHOL/MO/W.PET/CERES
3 CREAM (GRAM) TOPICAL
Qty: 90 TABLET | Refills: 1 | Status: SHIPPED | OUTPATIENT
Start: 2020-12-02 | End: 2021-04-18

## 2020-12-02 RX ORDER — LIOTHYRONINE SODIUM 25 UG/1
25 TABLET ORAL DAILY
Qty: 30 TABLET | Refills: 3 | Status: SHIPPED | OUTPATIENT
Start: 2020-12-03 | End: 2021-02-22

## 2020-12-02 RX ORDER — VENLAFAXINE HYDROCHLORIDE 150 MG/1
300 CAPSULE, EXTENDED RELEASE ORAL
Qty: 60 CAPSULE | Refills: 1 | Status: SHIPPED | OUTPATIENT
Start: 2020-12-03 | End: 2020-12-03

## 2020-12-02 RX ORDER — MIRABEGRON 50 MG/1
TABLET, FILM COATED, EXTENDED RELEASE ORAL
Qty: 90 TABLET | Refills: 0 | Status: SHIPPED | OUTPATIENT
Start: 2020-12-02 | End: 2020-12-31

## 2020-12-02 RX ORDER — DIAZEPAM 2 MG
2 TABLET ORAL EVERY 12 HOURS PRN
Qty: 30 TABLET | Refills: 1 | Status: ON HOLD | OUTPATIENT
Start: 2020-12-02 | End: 2021-02-07

## 2020-12-02 RX ORDER — MEMANTINE HYDROCHLORIDE 10 MG/1
TABLET ORAL
Qty: 60 TABLET | Refills: 1 | Status: ON HOLD | OUTPATIENT
Start: 2020-12-02 | End: 2021-01-09

## 2020-12-02 RX ORDER — DONEPEZIL HYDROCHLORIDE 10 MG/1
20 TABLET, FILM COATED ORAL AT BEDTIME
Qty: 180 TABLET | Refills: 1 | Status: SHIPPED | OUTPATIENT
Start: 2020-12-02 | End: 2021-02-22

## 2020-12-02 RX ORDER — FOLIC ACID 1 MG/1
5 TABLET ORAL DAILY
Qty: 150 TABLET | Refills: 3 | Status: SHIPPED | OUTPATIENT
Start: 2020-12-03 | End: 2021-02-22

## 2020-12-02 RX ADMIN — DONEPEZIL HYDROCHLORIDE 15 MG: 10 TABLET ORAL at 20:55

## 2020-12-02 RX ADMIN — CYCLOBENZAPRINE HYDROCHLORIDE 10 MG: 5 TABLET, FILM COATED ORAL at 13:15

## 2020-12-02 RX ADMIN — DIAZEPAM 2 MG: 2 TABLET ORAL at 12:12

## 2020-12-02 RX ADMIN — MIRABEGRON 50 MG: 25 TABLET, FILM COATED, EXTENDED RELEASE ORAL at 17:09

## 2020-12-02 RX ADMIN — FOLIC ACID 5 MG: 1 TABLET ORAL at 08:45

## 2020-12-02 RX ADMIN — PROPAFENONE HYDROCHLORIDE 150 MG: 150 TABLET, FILM COATED ORAL at 13:14

## 2020-12-02 RX ADMIN — ACETAMINOPHEN 325 MG: 325 TABLET, FILM COATED ORAL at 00:01

## 2020-12-02 RX ADMIN — ACETAMINOPHEN 325 MG: 325 TABLET, FILM COATED ORAL at 12:08

## 2020-12-02 RX ADMIN — CYCLOBENZAPRINE HYDROCHLORIDE 10 MG: 5 TABLET, FILM COATED ORAL at 08:46

## 2020-12-02 RX ADMIN — AZELASTINE HYDROCHLORIDE 1 SPRAY: 137 SPRAY, METERED NASAL at 20:55

## 2020-12-02 RX ADMIN — ACETAMINOPHEN 325 MG: 325 TABLET, FILM COATED ORAL at 06:47

## 2020-12-02 RX ADMIN — PREGABALIN 100 MG: 100 CAPSULE ORAL at 20:55

## 2020-12-02 RX ADMIN — PREGABALIN 100 MG: 100 CAPSULE ORAL at 13:15

## 2020-12-02 RX ADMIN — PROPAFENONE HYDROCHLORIDE 150 MG: 150 TABLET, FILM COATED ORAL at 20:55

## 2020-12-02 RX ADMIN — AZELASTINE HYDROCHLORIDE 1 SPRAY: 137 SPRAY, METERED NASAL at 08:47

## 2020-12-02 RX ADMIN — MEMANTINE 5 MG: 5 TABLET ORAL at 12:08

## 2020-12-02 RX ADMIN — PROPAFENONE HYDROCHLORIDE 150 MG: 150 TABLET, FILM COATED ORAL at 06:47

## 2020-12-02 RX ADMIN — LEVOTHYROXINE SODIUM 150 MCG: 75 TABLET ORAL at 08:45

## 2020-12-02 RX ADMIN — SIMVASTATIN 40 MG: 20 TABLET, FILM COATED ORAL at 20:55

## 2020-12-02 RX ADMIN — GUANFACINE 1 MG: 1 TABLET ORAL at 18:30

## 2020-12-02 RX ADMIN — LIOTHYRONINE SODIUM 25 MCG: 25 TABLET ORAL at 08:46

## 2020-12-02 RX ADMIN — CYCLOBENZAPRINE HYDROCHLORIDE 10 MG: 5 TABLET, FILM COATED ORAL at 20:55

## 2020-12-02 RX ADMIN — PREGABALIN 100 MG: 100 CAPSULE ORAL at 08:46

## 2020-12-02 RX ADMIN — VENLAFAXINE HYDROCHLORIDE 300 MG: 150 CAPSULE, EXTENDED RELEASE ORAL at 08:46

## 2020-12-02 RX ADMIN — DIAZEPAM 2 MG: 2 TABLET ORAL at 00:01

## 2020-12-02 RX ADMIN — ACETAMINOPHEN 325 MG: 325 TABLET, FILM COATED ORAL at 17:09

## 2020-12-02 ASSESSMENT — ACTIVITIES OF DAILY LIVING (ADL)
ORAL_HYGIENE: INDEPENDENT
DRESS: INDEPENDENT
LAUNDRY: WITH SUPERVISION
HYGIENE/GROOMING: INDEPENDENT

## 2020-12-02 NOTE — PLAN OF CARE
"  Problem: Suicidal Behavior  Goal: Suicidal Behavior is Absent or Managed  Outcome: Improving  Flowsheets (Taken 12/2/2020 1250)  Mutually Determined Action Steps (Suicidal Behavior Absent/Managed): verbalizes safety check rationale     Problem: Suicide Risk  Goal: Absence of Self-Harm  Intervention: Promote Psychosocial Wellbeing  Recent Flowsheet Documentation  Taken 12/2/2020 1232 by Maddy Ayers RN  Supportive Measures:    active listening utilized    positive reinforcement provided    verbalization of feelings encouraged    Patient is visible in the milieu attending groups. He socializes with select peer/s. His mood is calm with a flat affect but brighter on approach. Patient denies SI/SIB nor hallucinations. He rates his depression and anxiety as 2 out of 10 wherein 10 is the worst. Patient is hopeful that when he is discharged and gets home \"things will change\". Patient says that there are times that he thinks what trigger/s his depression. Patient says it's about his relationship with his family especially his wife and his daughter. Patient says that he feels alone and that what makes him happy are his grandchildren. His goal today is \"to keep improving. I wanna be a better member of my family\". Patient says that his coping skills when he is depressed is to isolate himself.  Patient reports that he sleeps better at night. He sleeps at least 6 hours during the last 48 hours. His appetite is good.     Patient had OT Testing today (Pls see OT notes). Patient is now started on Namenda 5 mgs and then Aricept at bedtime.     No complaints of physical pain. No nutritional concerns reported. Valium 2 mgs given  per patient's request. His INR is 7.28.     "

## 2020-12-02 NOTE — PROGRESS NOTES
Prior Authorization **INITIATED**    Medication: Venlafaxine ER 150mg caps - Quantity Limit  Insurance Company: [employer] [place] - Phone [phone] Fax [phone]  Pharmacy Filling the Rx: [facility] - [place], [place] - [address]  Filling Pharmacy Phone: [phone]  Filling Pharmacy Fax: [phone]  Start Date: 12/2/2020  Reference #: CoverMyMeds Key: [id number]  Comments:  Plan limits to 1 capsule daily. FDA limits to 225mg daily for pt's DX--unsure if they will allow up to 300mg daily.      Mary Etienne CPhT  [place] Discharge Pharmacy Liaison  Pronouns: She/Her/Hers    [facility]  [address] [facility]  [address], [place] [address]   [email or web address]  [email or web address]   Phone: [phone]  Pager: [phone]  Fax: [phone]

## 2020-12-02 NOTE — CONSULTS
OCCUPATIONAL THERAPY:  Independent Living Skills Evaluation / CPT / MoCA   [facility],   [address]         Angely Barron, BRUNAR/L    Patient Name     Todd Aschoff     Date of Assessment:    12-2-2020     Time of day:   early am    THE RESULTS OF THIS ASSESSMENT PROVIDE RECOMMENDATIONS BASED UPON FUNCTION AT THE TIME OF ADMINISTRATION ONLY, MAY NOT REFLECT BASELINE FUNCTION.    COGNITIVE PERFORMANCE TEST: The CPT is a standardized, performance-based assessment used to assess cognitive-functional information processing and executive processing.  This test is based on performance of some common activities. The level of performance may help to describe how information is being processed, potential safety risks and recommendations for supervision needs in the individual s living environment.  Updated with 2018 guidelines    CINTIA COGNITIVE ASSESSMENT (MoCA)      VERSION ____8.1   The Cintia Cognitive Assessment (MoCA) was designed as a rapid screening instrument for mild cognitive dysfunction. It assesses different cognitive domains: attention and concentration, executive functions, memory, language, visuoconstructional skills, conceptual thinking, calculations, and orientation. The total possible score is 30 points; a score of 26 or above is considered normal.    GENERAL RECOMMENDATIONS BASED UPON THE COGNITIVE PERFORMANCE TEST (CPT) SCORE.  Note these are ranges and there may be fluctuations in abilities for an individual at different times of days                                                                                                             CINTIA COGNITIVE ASSESSMENT SCORE:   21  /30  which is  below the normal range.    Draw a Clock Subtest: Score:    3  / 3      SUBTASK SCORE SUBTASK SCORE   Visuospatial/Executive   4  /5 Abstraction    2  /2   Naming   3   /3 Delayed Recall    3  /5   Attention   4   /6 Orientation    5  /6   Language   0   /3         CPT RESULTS:    SUBTASK  SCORE COMMENTS   Phone  6   /6 Completed task correctly.   Shop  5   /6 Completed task successfully with general cue of looking for additional item they had sufficient funds to purchase. Did not preplan by checking amount of funds available prior to making purchase   Med Box   4.5  /6 Was unsuccessful in correcting errors with specific cues. Fluidia was correct.   Wash   5  /5 Completed concrete task correctly.   Toast   5  /5 Completed concrete task correctly.     TOTAL CPT 5 TASK AVERAGE SCORE:    5.1  / 5.6  which is slightly below the normal range.    LEVEL 5.6 - 5.2  Normal range  Indicates normal functioning (absence of cognitive - functional disability). Information needed can be retrieved from stored memory to carry out complex purposeful activity with safety and accuracy.     1. Independent in managing personal affairs, seeking help or advice as needed.  2. Uses complex information to carry out daily activities with safety and accuracy.    3. Written information, numbers, symbols and hypothetical thoughts are well understood.  4. Problems are anticipated, errors are avoided, and consequences of actions are considered.     LEVEL 5.0   Mild functional decline due to deficits in executive control functions.  Basic cognitive functioning is intact, but higher-level functions are impaired and deficits may not be readily apparent.  May have trouble with complex information or activities; and problems are observed with memory, judgment, reasoning and planning ahead.  1. Safety:  May require assistance to plan ahead; or to manage complex medication schedules, appointments or finances.   2. IADL:  Generally able to complete basic self-cares and routine household tasks.  May begin to have difficulty with complex daily tasks such as reading, writing, meal preparation, shopping, finance management, job performance, management of complex medication regime, or driving. Check in support may be needed with monitoring of  areas listed. ADLs typically are managed without difficulty.  3. New learning may consist of trial and error.  4. May have mild problems with conversation and are often able to conceal deficits.  Driving ability may be affected. Recommend monitoring and consider whether a driving assessment is needed. Driving assessment recommended at least at 4.7 unless noted difficulties are noted otherwise.    ASSESSMENT/RECOMMENDATIONS (based upon assessment/group observation)    Nicko was pleasant and cooperative during this assessment session.  He stated feeling some of his difficulties stem from his hearing loss.  He used a Pocket Talker during the session as he has in Occupational Therapy groups and finds it helpful.  When asked if he was able to hear this author when directions were provided he stated yes.  He stated everyone helps with daily chores at home.  He was managing his own medications and was NOT using a medication box.  Nicko stated his wife manages the finances and has for many years.  He reports that he drives.     Nicko scored on the CPT 5.1, which is slightly below the normal range.  He scored on the MoCA 21 which is well below the normal range. Note the score on the MoCA Visualspatial/Executive score of 4/5, Draw-A-Clock 3/3. Note the score on Delayed Recall of 3/5. The Memory Index Score was 11/15, with spontaneous recall of 3 words, recalling 1 word with general cues, and 0 words with multiple choice cues. Because the score on the CPT was so close to the normal range, it may be important to consider the score on the subtask - med box set up.  During the performance on this portion Nicko took an extended period of time in attempting to problem solve the directions and was unsuccessful in correcting errors.  As the results of this task do not indicate whether a person is able to set up their own medications accurately or not, it may be beneficial to consider the difficulty in problem solving this familiar task.   One might consider the MoCA score at 21 of concern, and be reminded this assessment is a Screening Assessment indicating need for further assessment rather than being diagnostic.  Note the visual-spatial/executive and delayed recall scores being positive scores and therefore, scores of lesser concern.  It is also worth considering there was the area of language scoring 0 points and with his hearing loss though no reported of hearing difficulty, this may have had an effect on these questions.  Other points lost appear more variable split between the different areas.     If Nicko continues to perform at the level demonstrated during this assessment, recommendations include assistance with monitoring accuracy of medication management including using and being observed in medication med box set up and accuracy of compliance until this is mastered.      Additional recommendations discussed included general brain health tips of utilizing brain stimulating activities on a daily basis, such as cross word puzzles, word searches, reading, etc. Additional information was also discussed such as following as best possible to eat  heart healthy , sleeping adequate number of hours, getting physical activity within range of comfort ability, and managing stress levels as best possible.     With the difficulty noted on the MoCA and clear difficulty problem solving during one of the familiar tasks he performs at home, it may be helpful to consider an appointment with Neuropsychology for further assessment after additional stabilization time, 2-3 months from now.

## 2020-12-02 NOTE — PLAN OF CARE
"Pt is calm and cooperative.  Somewhat subdued this evening.  Visible in the lounge.  Attending groups.  Affect is flat.  Denies all MH symptoms, denies SI/SIB.  States he feels \"good.\"  Brief responses during check in.    INR continues to trend upwards.  8.78 at 1900. No s/s of bleeding.  Will continue to monitor.  INR lab draw q 6 hours.  "

## 2020-12-02 NOTE — PROGRESS NOTES
Patient attended a 60 minute psychoeducation group on the Cognitive Model. The relationship between thoughts, emotions and behaviors was discussed. Participants discussed several scenarios and ways to change irrational thoughts into positive and rational thoughts. They also discussed the new emotion and behavior that came from positive, rational thoughts. Patient was an active participant. He was able to identify his own cognitive distortions and ways to change irrational thoughts to rational thoughts.

## 2020-12-02 NOTE — PLAN OF CARE
Problem: General Rehab Plan of Care  Goal: Therapeutic Recreation/Music Therapy Goal  Description: The patient and/or their representative will achieve their patient-specific goals related to the plan of care.  Pt participated in Therapeutic Recreation group with focus on leisure participation, communication skills, and critical thinking. Engaged and focused in the group recreational activity via a group game.  Pt was a full participant throughout the entire duration of group.  Pt added to the discussion and to the clues given to others during their turns.   Outcome: No Change

## 2020-12-02 NOTE — PLAN OF CARE
Work Completed: The patient's care was discussed with the treatment team and chart notes were reviewed. Plan is for discharge tomorrow. Writer completed AVS. Patient requested worksheet on communication which was provided.    Discharge plan or goal: Discharge home tomorrow.                Barriers to discharge: Stabilization of symptoms.

## 2020-12-02 NOTE — PROGRESS NOTES
Brief Medicine Note    Following for elevated INR in setting of warfarin overdose.  Poison control and pharmacy following.  Plan has been q 6 hour INR checks with plan to administer Vitamin K as needed (last on 11/29). Discussed with Poison control today.   - Change INR checks to q 12 hours   - Per poison control, no vitamin K needed unless acute bleeding or bruising noted or INR >10  - Will touch base with poison control on 12/3 [phone]    Lizzeth Lamb, MPH, [facility]  Hospitalist Service  Pager [phone]

## 2020-12-02 NOTE — PLAN OF CARE
Requested for prn Valium which he stated that he takes for pain.  Reported lover back and left shoulder pains.  Valium 2 mg given with scheduled Tylenol 325mg at 0001  Midnight INR is 8.41, no signs of bleeding  Will continue to monitor  Slept for 6.5 hours.  Vital signs stable  Incontinent of urine x 2 nights, encouraged to ask for pull ups at HS.  0709 Approached this writer in the med room requesting for Diazepam, pt was informed he took it at midnight and its scheduled every 12 hours prn.

## 2020-12-03 LAB
INR PPP: 5.19 (ref 0.86–1.14)
INR PPP: 6.2 (ref 0.86–1.14)

## 2020-12-03 PROCEDURE — 250N000013 HC RX MED GY IP 250 OP 250 PS 637: Performed by: CLINICAL NURSE SPECIALIST

## 2020-12-03 PROCEDURE — 124N000003 HC R&B MH SENIOR/ADOLESCENT

## 2020-12-03 PROCEDURE — G0177 OPPS/PHP; TRAIN & EDUC SERV: HCPCS

## 2020-12-03 PROCEDURE — 85610 PROTHROMBIN TIME: CPT | Performed by: NURSE PRACTITIONER

## 2020-12-03 PROCEDURE — 250N000013 HC RX MED GY IP 250 OP 250 PS 637: Performed by: PSYCHIATRY & NEUROLOGY

## 2020-12-03 PROCEDURE — 36415 COLL VENOUS BLD VENIPUNCTURE: CPT | Performed by: NURSE PRACTITIONER

## 2020-12-03 PROCEDURE — 90853 GROUP PSYCHOTHERAPY: CPT

## 2020-12-03 RX ORDER — VENLAFAXINE HYDROCHLORIDE 75 MG/1
225 CAPSULE, EXTENDED RELEASE ORAL
Qty: 90 CAPSULE | Refills: 2 | Status: SHIPPED | OUTPATIENT
Start: 2020-12-04 | End: 2020-12-11

## 2020-12-03 RX ADMIN — PROPAFENONE HYDROCHLORIDE 150 MG: 150 TABLET, FILM COATED ORAL at 05:55

## 2020-12-03 RX ADMIN — MEMANTINE 5 MG: 5 TABLET ORAL at 08:08

## 2020-12-03 RX ADMIN — GUANFACINE 1 MG: 1 TABLET ORAL at 21:04

## 2020-12-03 RX ADMIN — AZELASTINE HYDROCHLORIDE 1 SPRAY: 137 SPRAY, METERED NASAL at 08:09

## 2020-12-03 RX ADMIN — VENLAFAXINE HYDROCHLORIDE 300 MG: 150 CAPSULE, EXTENDED RELEASE ORAL at 08:08

## 2020-12-03 RX ADMIN — SIMVASTATIN 40 MG: 20 TABLET, FILM COATED ORAL at 21:04

## 2020-12-03 RX ADMIN — PREGABALIN 100 MG: 100 CAPSULE ORAL at 08:08

## 2020-12-03 RX ADMIN — CYCLOBENZAPRINE HYDROCHLORIDE 10 MG: 5 TABLET, FILM COATED ORAL at 21:04

## 2020-12-03 RX ADMIN — CYCLOBENZAPRINE HYDROCHLORIDE 10 MG: 5 TABLET, FILM COATED ORAL at 08:08

## 2020-12-03 RX ADMIN — PROPAFENONE HYDROCHLORIDE 150 MG: 150 TABLET, FILM COATED ORAL at 21:03

## 2020-12-03 RX ADMIN — LEVOTHYROXINE SODIUM 150 MCG: 75 TABLET ORAL at 08:08

## 2020-12-03 RX ADMIN — ACETAMINOPHEN 325 MG: 325 TABLET, FILM COATED ORAL at 17:58

## 2020-12-03 RX ADMIN — FOLIC ACID 5 MG: 1 TABLET ORAL at 08:08

## 2020-12-03 RX ADMIN — ACETAMINOPHEN 325 MG: 325 TABLET, FILM COATED ORAL at 05:55

## 2020-12-03 RX ADMIN — LIOTHYRONINE SODIUM 25 MCG: 25 TABLET ORAL at 08:08

## 2020-12-03 RX ADMIN — PREGABALIN 100 MG: 100 CAPSULE ORAL at 14:23

## 2020-12-03 RX ADMIN — DONEPEZIL HYDROCHLORIDE 15 MG: 10 TABLET ORAL at 21:04

## 2020-12-03 RX ADMIN — PREGABALIN 100 MG: 100 CAPSULE ORAL at 21:05

## 2020-12-03 RX ADMIN — ACETAMINOPHEN 325 MG: 325 TABLET, FILM COATED ORAL at 00:18

## 2020-12-03 RX ADMIN — PROPAFENONE HYDROCHLORIDE 150 MG: 150 TABLET, FILM COATED ORAL at 12:25

## 2020-12-03 RX ADMIN — MIRABEGRON 50 MG: 25 TABLET, FILM COATED, EXTENDED RELEASE ORAL at 17:58

## 2020-12-03 RX ADMIN — AZELASTINE HYDROCHLORIDE 1 SPRAY: 137 SPRAY, METERED NASAL at 21:05

## 2020-12-03 RX ADMIN — CYCLOBENZAPRINE HYDROCHLORIDE 10 MG: 5 TABLET, FILM COATED ORAL at 14:23

## 2020-12-03 RX ADMIN — ACETAMINOPHEN 325 MG: 325 TABLET, FILM COATED ORAL at 12:25

## 2020-12-03 ASSESSMENT — ACTIVITIES OF DAILY LIVING (ADL)
HYGIENE/GROOMING: INDEPENDENT
HYGIENE/GROOMING: INDEPENDENT
ORAL_HYGIENE: INDEPENDENT
ORAL_HYGIENE: INDEPENDENT
DRESS: INDEPENDENT
LAUNDRY: WITH SUPERVISION

## 2020-12-03 NOTE — PROGRESS NOTES
Prior Authorization **DENIED**    Medication: Venlafaxine ER 150mg caps **DENIED**  Denial Date:    Reference #: CoverMyMeds Key: [id number]  Denial Rational:     Appeal Information:     Comments:  Plan limits to 1 capsule daily. FDA limits to 225mg daily for pt's DX. Patient changed to 225mg dose for discharge.          Mary Etienne CPhT  [place] Discharge Pharmacy Liaison  Pronouns: She/Her/Hers    [facility]  [facility]  [address] [place] [address] [address] [place] [id number]   [email or web address]   Phone: [phone]  Pager: [phone]  Fax: [phone]
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
## 2020-12-03 NOTE — PLAN OF CARE
Work Completed: The patient's care was discussed with the treatment team and chart notes were reviewed. Patient was scheduled for discharge today, but medical team reports patient needs to stay longer. AVS is complete.    Discharge plan or goal: Discharge home                Barriers to discharge: Patient is not medically stable.

## 2020-12-03 NOTE — PROGRESS NOTES
Brief Medicine Note    Following for elevated INR in the setting of warfarin overdose. Poison control and pharmacy following. Currently checking INR Q 12h with plan to administer Vitamin K as needed (last dose 11/29). INR down trending this AM.   - Discussed with poison control today  - Continue INR Q 12 h   - Per poison control, no vitamin K needed unless acute bleeding or bruising noted or INR >10   - Will touch base with poison control on 12/4 ([phone])    Samira Ravi PA-C  Hospitalist Service  [phone]

## 2020-12-03 NOTE — PLAN OF CARE
Problem: Suicidal Behavior  Goal: Suicidal Behavior is Absent or Managed  Outcome: Adequate for Discharge  Flowsheets (Taken 12/2/2020 1250 by Maddy Ayers RN)  Mutually Determined Action Steps (Suicidal Behavior Absent/Managed): verbalizes safety check rationale  Note: Pt is out in the milieu and social with peers. Medication complaint. Appetite good. Pt states mood is poor at this time due to delay ion discharge plans. Pt rates depression as low 1/10. And anxiety 2/10. Pt states he feels ready for discharge. IM following INR. Pt slightly irritable about delay in discharge and has many concerns which were answered by this writer. Pt requests to talk directly with IM. Attended group. Pain is effectively controlled. Denies suicidal ideation or self injury.

## 2020-12-03 NOTE — PLAN OF CARE
Problem: OT General Care Plan  Goal: OT Goal 1  Description: Will attend OT groups,  and identify areas of cognition abilities in problem solving and thought organization.     Pt actively participated in occupational therapy clinic. Pt was able to ask for assistance as needed, and independently initiated a goal-directed, cognitive task on the iPad. Pt demonstrated good attention to task. Occasionally social with peers and writer. Calm, pleasant, and cooperative.

## 2020-12-03 NOTE — PLAN OF CARE
Pt visible in milieu. Affect blunted but brightens on approach. Feels ready for discharge tomorrow. Reports he feels more hopeful about situation at home, and is encouraging wife to start therapy. States he is hoping to resume therapy himself. States they would go to couples therapy but their insurance plan will not cover it. Mood improved since admission. Denies SI/SIB. Some anxiety after talking to wife on phone; BP elevated (see flowsheet). Tenex given early. Calmer later and BP improved later in evening. Pleasant, polite, cooperative. Med-compliant. Continue with current treatment plan and recommendations. Continue to monitor and reassess symptoms. Monitor response to medications. Monitor progress towards treatment goals. Encourage groups and participation.

## 2020-12-03 NOTE — PROGRESS NOTES
Call received from lab to report critical result. INR 7.28. No change in value since collection approximately 12 hours earlier today. Internal medicine already aware and following pt's results. Note left for IM to address plan for warfarin upon pt's discharge which is planned for tomorrow around mid-day.

## 2020-12-04 VITALS
WEIGHT: 313.4 LBS | RESPIRATION RATE: 16 BRPM | HEART RATE: 77 BPM | BODY MASS INDEX: 41.54 KG/M2 | DIASTOLIC BLOOD PRESSURE: 85 MMHG | SYSTOLIC BLOOD PRESSURE: 128 MMHG | TEMPERATURE: 97 F | HEIGHT: 73 IN | OXYGEN SATURATION: 95 %

## 2020-12-04 LAB — INR PPP: 4.79 (ref 0.86–1.14)

## 2020-12-04 PROCEDURE — 85610 PROTHROMBIN TIME: CPT | Performed by: NURSE PRACTITIONER

## 2020-12-04 PROCEDURE — G0177 OPPS/PHP; TRAIN & EDUC SERV: HCPCS

## 2020-12-04 PROCEDURE — 250N000013 HC RX MED GY IP 250 OP 250 PS 637: Performed by: PSYCHIATRY & NEUROLOGY

## 2020-12-04 PROCEDURE — 36415 COLL VENOUS BLD VENIPUNCTURE: CPT | Performed by: NURSE PRACTITIONER

## 2020-12-04 PROCEDURE — 99231 SBSQ HOSP IP/OBS SF/LOW 25: CPT | Performed by: PHYSICIAN ASSISTANT

## 2020-12-04 PROCEDURE — 250N000013 HC RX MED GY IP 250 OP 250 PS 637: Performed by: CLINICAL NURSE SPECIALIST

## 2020-12-04 RX ADMIN — LEVOTHYROXINE SODIUM 150 MCG: 75 TABLET ORAL at 08:22

## 2020-12-04 RX ADMIN — PREGABALIN 100 MG: 100 CAPSULE ORAL at 13:12

## 2020-12-04 RX ADMIN — PREGABALIN 100 MG: 100 CAPSULE ORAL at 08:23

## 2020-12-04 RX ADMIN — AZELASTINE HYDROCHLORIDE 1 SPRAY: 137 SPRAY, METERED NASAL at 08:32

## 2020-12-04 RX ADMIN — ACETAMINOPHEN 325 MG: 325 TABLET, FILM COATED ORAL at 11:50

## 2020-12-04 RX ADMIN — PROPAFENONE HYDROCHLORIDE 150 MG: 150 TABLET, FILM COATED ORAL at 05:44

## 2020-12-04 RX ADMIN — ACETAMINOPHEN 325 MG: 325 TABLET, FILM COATED ORAL at 00:20

## 2020-12-04 RX ADMIN — ACETAMINOPHEN 325 MG: 325 TABLET, FILM COATED ORAL at 05:44

## 2020-12-04 RX ADMIN — CYCLOBENZAPRINE HYDROCHLORIDE 10 MG: 5 TABLET, FILM COATED ORAL at 13:12

## 2020-12-04 RX ADMIN — VENLAFAXINE HYDROCHLORIDE 225 MG: 75 CAPSULE, EXTENDED RELEASE ORAL at 08:22

## 2020-12-04 RX ADMIN — PROPAFENONE HYDROCHLORIDE 150 MG: 150 TABLET, FILM COATED ORAL at 13:12

## 2020-12-04 RX ADMIN — MEMANTINE 5 MG: 5 TABLET ORAL at 08:22

## 2020-12-04 RX ADMIN — FOLIC ACID 5 MG: 1 TABLET ORAL at 08:23

## 2020-12-04 RX ADMIN — CYCLOBENZAPRINE HYDROCHLORIDE 10 MG: 5 TABLET, FILM COATED ORAL at 08:23

## 2020-12-04 RX ADMIN — LIOTHYRONINE SODIUM 25 MCG: 25 TABLET ORAL at 08:23

## 2020-12-04 NOTE — PLAN OF CARE
"Pt is calm and cooperative.  Visible in the milieu.  Attending groups.  Lightly social at times.  Reports anxiety and depression are \"pretty good.\"  Denies any SI/SIB.  Expressed disappointment in not discharging today, but states that he understands the reasoning.      INR 5.19 at 1919  "

## 2020-12-04 NOTE — PLAN OF CARE
Pt has been out in the milieu and pleasant with peers and staff. Pt is medication complaint. Pt denies suicidal ideation or self injury. Pt states he feels ready to be discharged. Pt states he understands discharge plans and appointments. Pt rates depression at 1/10 and no anxiety. Pt is able to identify 2 coping skills related to depression. Pt is able to identify a relapse prevention plan. Pt has support from wife and family. Pt has Appointment on Monday for INR and coumadin clinic. Pt states he has a good support system. Pt's daughter will be picking him up at 1400 for discharge.

## 2020-12-04 NOTE — PLAN OF CARE
Work Completed: The patient's care was discussed with the treatment team and chart notes were reviewed. Patient will discharge today if INR appointment can be scheduled for Monday. Writer was able to schedule appointment.    Discharge plan or goal: Discharge home                Barriers to discharge: None

## 2020-12-04 NOTE — DISCHARGE INSTRUCTIONS
Behavioral Discharge Planning and Instructions      Summary:  You were admitted on 11/27/2020  due to Suicide Attempt.  You were treated by Dr. Aurelio Veronica MD and discharged on 12/4/2020 from [facility] to Home      Principal Diagnosis:   1.  Major depressive disorder, recurrent, severe, without psychosis.   2.  General anxiety disorder.   3.  Chronic back pain.   4.  Atrial fibrillation.   5.  Status post serious overdose attempt with prescribed medication prescribed warfarin and diazepam.     Health Care Follow-up Appointments:     Primary Care appointment: Friday, December 11 at 2:40 in clinic   INR Check: Monday, December 7 at 9:45 am (go straight to the lab, [address])  PCP: Obdulio Ingram MD  [facility] [address], [place] [address]   Phone:  [phone]    Therapy appointment: Monday, December 14 at 9:00 am by phone from restricted/blocked number  Provider: [facility]  [address] [facility], [address]  [place] [address]  Phone: [phone], Fax: [phone]  (you will receive paperwork by e-ail)      Attend all scheduled appointments with your outpatient providers. Call at least 24 hours in advance if you need to reschedule an appointment to ensure continued access to your outpatient providers.   Major Treatments, Procedures and Findings:  You were provided with: a psychiatric assessment, assessed for medical stability, medication evaluation and/or management, group therapy, milieu management and medical interventions    Symptoms to Report: feeling more aggressive, increased confusion, losing more sleep, mood getting worse or thoughts of suicide    Early warning signs can include: increased depression or anxiety sleep disturbances increased thoughts or behaviors of suicide or self-harm  increased unusual thinking, such as paranoia or hearing voices    Safety and Wellness:  Take all medicines as directed.  Make no changes unless your doctor suggests them.    "Follow treatment recommendations.  Refrain from alcohol and non-prescribed drugs.  If there is a concern for safety, call 911.    Resources:   Crisis Intervention: 615.159.4019 or 520-014-4460 (TTY: 510.281.4551).  Call anytime for help.  National Goff on Mental Illness (www.[place].clarisa.org): 364.656.6980 or 990-836-1351.  Suicide Awareness Voices of Education (SAVE) (www.save.org): 491-021-DIGL (9588)  National Suicide Prevention Line (www.mentalhealthmn.org): 219-319-MOBL (7530)  [facility] Crisis Response 397-288-0605  Text 4 Life: txt \"LIFE\" to 42629 for immediate support and crisis intervention  Crisis text line: Text \"[place]\" to 144116. Free, confidential, 24/7.      The treatment team has appreciated the opportunity to work with you.     Nicko,  please take care and make your recovery a daily recovery.   If you have any questions or concerns our unit number is [phone]  "

## 2020-12-04 NOTE — PROGRESS NOTES
"Brief Medicine Note     S: Medicine asked to assess edema by patient. Mild, bilateral ankle, forefoot edema w/ 1+ pitting, does not extend past ankle. AROM intact. Peeing ok. Denies PND, dyspnea or cough. No worsening pain. Noticed on Tuesday, feels it is better overnight but comes back during the day.     O: /85   Pulse 77   Temp 97  F (36.1  C) (Temporal)   Resp 16   Ht 1.854 m (6' 1\")   Wt 142.2 kg (313 lb 6.4 oz)   SpO2 95%   BMI 41.35 kg/m    GEN: Awake, alert, sitting comfortably in group, atalgic gait-  Notes chronic from leg length discrepancy and inability to wear appropriate shoes  CV: Mechanical click of valve, RRR  PULM: CTAB  EXT: 1+ edema of foot, ankle, doesn't extend past ankle into LE, no erythema or e/o cellulitis, AROM of ankle intact    A/P:  #Supratherapeutic INR: S/P warfarin overdose. INR currently 4.79 (5.19). Received 5 of Vit K 1128 and 5 of vitamin K 11/29. Denies current SI.   -Discussed with poison control, reassuring this is downtrending, no indication to keep patient admitted, they recommend no anticoagulation on discharge and follow up with anticoagulation clinic/PCP on Monday for continued INR monitoring and further dosing instructions- extensive discussion with patient who agreed  -Discussed with patient, go to ER if any bleeding, and/or if any trauma (nadya head)    #Bilateral LE edema: Mild edema in bilateral feet/ankle w/ 1+ pitting. Likely due to activity/diet/fluid intake while on psych unit. Weight stable on admission.   Wt Readings from Last 4 Encounters:   12/01/20 142.2 kg (313 lb 6.4 oz)   03/06/20 144.7 kg (319 lb)   02/17/20 141.5 kg (312 lb)   01/22/20 141.5 kg (312 lb)     -Discussed with patient, on discharge elevate legs to above level of heart, f/u with PCP on Monday for continued monitoring/management    Medicine will sign off as patient discharging home today. Notify on call THOR if any intercurrent medical issues arise.    Brandy Mckeon PA-C "

## 2020-12-04 NOTE — PROGRESS NOTES
12/03/20 2200   Psychotherapy Group   Type of Intervention    (Psychotherapy-   Mindfulness art/ CBT goals   Response participates, initiates socially appropriate/ some redirection   Hours 2   Treatment Detail    (Psychotherapy group Mindfulness / steps to goals(cbt)      The  Psychotherapy group goal is to promote insight to positive choice and change. Group processing is within a supportive and safe environment. Patients will process emotions using verbal group and expressive psychotherapy interventions including visual art/writing interventions.     Group interventions support patients by : cultivating resilience, fostering self awareness, self expression, self esteem and self compassion.  Groups will provide tools to encourage self and others in group, to practice communication/ social skills and supports, learn positive coping mechanisms, self efficacy, conflict resolution, empowerment, optimism ,hope , understanding ones emotions,and a sense of self and community.  Tools will be provided to manage life stressors  and individual's diagnosis      Modalities to reach these goals include: positive and solution focused psychology, CBT, DBT, ACT, Narrative psychology, Adlerian psychology, FLOW, Expressive Arts Continuum Therapies( Art Therapy) and Mindfulness based directives and discussions.        Subjective -patient report of mood today-angry about not being able to discharge because of medical issue     Objective/ Intervention- Goal of group and Therapeutic modality utilized-CBT Goals Short and Long term/ Mindfulness practice     Group Response-engaged     Patient Response-Pt participated in two groups and did thoughtful work in both groups.Pt was pleasant, cooperative and engaged in groups today.  He did need some conversational redirection for perseverating and repeating about the medical situation which was monopolizing some of the group conversation.     Angelito Morales, LMFT, ATR-BC

## 2020-12-05 ENCOUNTER — APPOINTMENT (OUTPATIENT)
Dept: GENERAL RADIOLOGY | Facility: CLINIC | Age: 65
End: 2020-12-05
Attending: PHYSICIAN ASSISTANT
Payer: COMMERCIAL

## 2020-12-05 ENCOUNTER — HOSPITAL ENCOUNTER (EMERGENCY)
Facility: CLINIC | Age: 65
Discharge: HOME | End: 2020-12-05
Attending: PHYSICIAN ASSISTANT | Admitting: PHYSICIAN ASSISTANT
Payer: COMMERCIAL

## 2020-12-05 VITALS
OXYGEN SATURATION: 95 % | HEART RATE: 79 BPM | BODY MASS INDEX: 41.35 KG/M2 | SYSTOLIC BLOOD PRESSURE: 141 MMHG | HEIGHT: 73 IN | RESPIRATION RATE: 18 BRPM | DIASTOLIC BLOOD PRESSURE: 102 MMHG | TEMPERATURE: 98.3 F

## 2020-12-05 DIAGNOSIS — R79.1 SUPRATHERAPEUTIC INR: ICD-10-CM

## 2020-12-05 DIAGNOSIS — M79.662 PAIN OF LEFT LOWER LEG: ICD-10-CM

## 2020-12-05 DIAGNOSIS — S80.12XA CONTUSION OF LEFT LOWER LEG, INITIAL ENCOUNTER: ICD-10-CM

## 2020-12-05 DIAGNOSIS — S46.912A STRAIN OF LEFT SHOULDER, INITIAL ENCOUNTER: ICD-10-CM

## 2020-12-05 LAB
BASOPHILS # BLD AUTO: 0.1 10E9/L (ref 0–0.2)
BASOPHILS NFR BLD AUTO: 0.5 %
DIFFERENTIAL METHOD BLD: NORMAL
EOSINOPHIL # BLD AUTO: 0.1 10E9/L (ref 0–0.7)
EOSINOPHIL NFR BLD AUTO: 1.4 %
ERYTHROCYTE [DISTWIDTH] IN BLOOD BY AUTOMATED COUNT: 13.2 % (ref 10–15)
HCT VFR BLD AUTO: 42.6 % (ref 40–53)
HGB BLD-MCNC: 14.4 G/DL (ref 13.3–17.7)
IMM GRANULOCYTES # BLD: 0 10E9/L (ref 0–0.4)
IMM GRANULOCYTES NFR BLD: 0.4 %
INR PPP: 3.24 (ref 0.86–1.14)
LYMPHOCYTES # BLD AUTO: 1.6 10E9/L (ref 0.8–5.3)
LYMPHOCYTES NFR BLD AUTO: 15.7 %
MCH RBC QN AUTO: 32.6 PG (ref 26.5–33)
MCHC RBC AUTO-ENTMCNC: 33.8 G/DL (ref 31.5–36.5)
MCV RBC AUTO: 96 FL (ref 78–100)
MONOCYTES # BLD AUTO: 0.9 10E9/L (ref 0–1.3)
MONOCYTES NFR BLD AUTO: 9 %
NEUTROPHILS # BLD AUTO: 7.4 10E9/L (ref 1.6–8.3)
NEUTROPHILS NFR BLD AUTO: 73 %
NRBC # BLD AUTO: 0 10*3/UL
NRBC BLD AUTO-RTO: 0 /100
PLATELET # BLD AUTO: 221 10E9/L (ref 150–450)
RBC # BLD AUTO: 4.42 10E12/L (ref 4.4–5.9)
WBC # BLD AUTO: 10.1 10E9/L (ref 4–11)

## 2020-12-05 PROCEDURE — 99285 EMERGENCY DEPT VISIT HI MDM: CPT

## 2020-12-05 PROCEDURE — 85025 COMPLETE CBC W/AUTO DIFF WBC: CPT | Performed by: PHYSICIAN ASSISTANT

## 2020-12-05 PROCEDURE — 85610 PROTHROMBIN TIME: CPT | Performed by: PHYSICIAN ASSISTANT

## 2020-12-05 PROCEDURE — 73610 X-RAY EXAM OF ANKLE: CPT | Mod: LT

## 2020-12-05 PROCEDURE — 73590 X-RAY EXAM OF LOWER LEG: CPT | Mod: LT

## 2020-12-05 PROCEDURE — 73030 X-RAY EXAM OF SHOULDER: CPT | Mod: LT

## 2020-12-05 PROCEDURE — 36415 COLL VENOUS BLD VENIPUNCTURE: CPT | Performed by: PHYSICIAN ASSISTANT

## 2020-12-05 ASSESSMENT — ENCOUNTER SYMPTOMS
MYALGIAS: 1
ARTHRALGIAS: 1

## 2020-12-05 NOTE — ED AVS SNAPSHOT
[facility] Emergency Dept  [address]  [facility] [address]  Phone: [phone]  Fax: [phone]                                    Todd S Aschoff   MRN: [id number]    Department: [facility] Emergency Dept   Date of Visit: 12/5/2020           After Visit Summary Signature Page    I have received my discharge instructions, and my questions have been answered. I have discussed any challenges I see with this plan with the nurse or doctor.    ..........................................................................................................................................  Patient/Patient Representative Signature      ..........................................................................................................................................  Patient Representative Print Name and Relationship to Patient    ..................................................               ................................................  Date                                   Time    ..........................................................................................................................................  Reviewed by Signature/Title    ...................................................              ..............................................  Date                                               Time          22EPIC Rev 08/18

## 2020-12-05 NOTE — ED TRIAGE NOTES
ABCs intact. Pt slipped and fell onto L side. Pt c/o L shoulder and leg pain. Denies hitting head. Pt is on warfarin. Last INR 4.79. Pt was discharged yesterday from [facility]. Denies SI at present.

## 2020-12-05 NOTE — ED PROVIDER NOTES
Emergency Department Attending Supervision Note  12/5/2020  5:10 PM      I evaluated this patient in conjunction with Erika Mccarty PA-C.    Briefly, the patient presented with an evaluation of a mechanical fall. The patient reports that he slipped and fell onto his left side. He has left leg pain above his foot and left shoulder pain. He denies hitting his head or loss of consciousness. The patient is anticoagulated on Coumadin. Per chart review on 11/27/2020 he had an overdose with 35 tabs of 5 mg of warfarin. Refer to the HPI for further detail.       On my exam, ***  General: Patient is alert and interactive when I enter the room  Head:  The scalp, face, and head appear normal  Eyes:  Conjunctivae are normal  ENT:    The nose is normal    Pinnae are normal    External acoustic canals are normal  Neck:  Trachea midline  CV:  Pulses are normal ***.    Resp:  No respiratory distress   Abdomen:      Soft, non-tender, non-distended  Musc:  Normal muscular tone    No major joint effusions    No asymmetric leg swelling    Good capillary refill noted  Skin:  No rash or lesions noted  Neuro:  Speech is normal and fluent. Face is symmetric.     Moving all extremities well.   Psych: Awake. Alert.  Normal affect.  Appropriate interactions.     Results:  XR Ankle, 3 views, left:  Normal joint spaces and alignment. No fracture. Soft tissue swelling.     Imaging independently reviewed and agree with radiologist interpretation.       XR Tibia & Fibula, 2 views, left:  Normal joint spaces and alignment. No fracture. Soft tissue swelling.     Imaging independently reviewed and agree with radiologist interpretation.       Shoulder XR, 3 views, left:  No acute fracture or dislocation.     Laboratory:  CBC: AWNL. (WBC 10.1, HGB 14.4, )   INR: 3.24 (H)    ED course:      My impression is ***      Diagnosis  No diagnosis found.      Agustina Rizo MD

## 2020-12-05 NOTE — ED PROVIDER NOTES
History   Chief Complaint:  Fall    HPI   Todd S Aschoff is a 64 year old male anticoagulated on Coumadin with a history of osteoarthritis and lumbar radiculopathy who presents to the ED for an evaluation of a mechanical fall. The patient reports slipping and falling on his left side when he transitioned from walking on carpet to linoleum. His left shoulder took the brunt of the fall and is currently in severe pain regardless of movement. His left leg also hurts, particularly the region right above his foot. There is also some swelling in his left leg. He denies hitting his head or losing consciousness. He did not notice hitting anything since he was in so much pain from his left shoulder. He denies any knee pain. His last INR was 4.79 on 12/04/2020.    Allergies:  Gabapentin    Medications:    Zyrtec  Flexeril  Valium  Aricept  Tenex  Synthroid/Levothroid  Cytomel  Namenda  Myrbetriq  Lyrica  Rythmol  Senokot-S/Pericolace  Zocor  Effexor-XR  Robitussin  Coumadin  Albuterol    Past Medical History:    Alcohol dependence  Atrial fibrillation  BPH  Chronic low back pain  Coagulation disorder  Dissection of aorta, thoracic  Heart murmur  Depression  Hyperlipidemia  Osteoarthritis  Sciatica   Hypothyroidism   Lumbar radiculopathy  Tourette syndrome  Spinal stenosis  Radiculitis    Past Surgical History:    Aortic valve replacement  C total hip arthroplasty x2  Cataract removal right eye  Cholecystectomy   Colonoscopy  Lumbar decompression  Fusion cervical anterior decompression  Explore spine, remove hardware  Cervical anterior fusion   Irrigation and debridement of spine, close wound  Removal of cyst of back  Tonsillectomy   [place] tooth removal   Repair of TAA of graft  Elbow surgery    Family History:    Cerebrovascular disease  Prostate cancer  Dementia  Lymphoma   Diabetes  Sleep apnea  Hypertension    Social History:  Smoking status: never  Alcohol use: no, stopped drinking ~2009  Drug use: no  PCP: Peter J  "MD Nataly  Marital Status:   [2]     Review of Systems   Cardiovascular: Positive for leg swelling (Left leg).   Musculoskeletal: Positive for arthralgias (Left shoulder) and myalgias (Left leg).   All other systems reviewed and are negative.    Physical Exam     Patient Vitals for the past 24 hrs:   BP Temp Temp src Pulse Resp SpO2 Height   12/05/20 1800 (!) 141/102 -- -- 79 -- 95 % --   12/05/20 1730 137/86 -- -- 82 -- -- --   12/05/20 1705 -- -- -- -- -- 99 % --   12/05/20 1700 (!) 138/92 -- -- 84 -- 100 % --   12/05/20 1635 (!) 117/100 -- -- 83 -- 96 % --   12/05/20 1501 (!) 150/104 98.3  F (36.8  C) Oral 84 18 96 % 1.854 m (6' 1\")       Physical Exam  General: Alert and interactive. Appears well. Cooperative and pleasant.   Eyes: The pupils are equal and round. EOMs intact. No scleral icterus.  ENT: No abnormalities to the external nose or ears. Mucous membranes moist. Posterior oropharynx is non-erythematous.      Neck: Trachea is in the midline. No nuchal rigidity.     CV: Regular rate and rhythm. S1 and S2 normal without systolic click. DP pulsations strong on right, weaker on left. Dopplered on left.   Resp: Breath sounds are clear bilaterally, without rhonchi, wheezes, rales. Non-labored, no retractions or accessory muscle use.     GI: Abdomen is soft without distension. No tenderness to palpation. No peritoneal signs.    MS: Moving all extremities well. Good muscle tone.   There is tenderness across the left distal clavicle, AC joint, and humeral head. No obvious deformity. There is decreased ROM in shoulder overhead due to pain.   Left leg, below knee is extensively bruised and the skin is taut. Compartments are tight. There is tenderness with palpation of the posterior calf. Decreased ROM in the ankle. Tenderness overlying left medial and lateral malleolus. Dorsiflexion and plantar flexion is intact and does not illicit extreme pain.   Skin: Warm and dry. No rash or lesions noted.  Neuro: Alert " and oriented x 3. No focal neurologic deficits. Good strength and sensation in upper and lower extremities. Psych: Awake. Alert.  Normal affect. Appropriate interactions.  Lymph: No anterior or posterior cervical lymphadenopathy noted.    Emergency Department Course   Imaging:  Radiology findings were communicated with the patient who voiced understanding of the findings.    XR Ankle, 3 views, left:  Normal joint spaces and alignment. No fracture. Soft tissue swelling.    Imaging independently reviewed and agree with radiologist interpretation.      XR Tibia & Fibula, 2 views, left:  Normal joint spaces and alignment. No fracture. Soft tissue swelling.    Imaging independently reviewed and agree with radiologist interpretation.      Shoulder XR, 3 views, left:  No acute fracture or dislocation.     Imaging independently reviewed and agree with radiologist interpretation.      Laboratory:  Laboratory findings were communicated with the patient who voiced understanding of the findings.    CBC: AWNL. (WBC 10.1, HGB 14.4, )     INR: 3.24 (H)    Emergency Department Course:  Past medical records, nursing notes, and vitals reviewed.    1556 I performed an exam of the patient as documented above.     IV was inserted and blood was drawn for laboratory testing, results above.  The patient was sent for a Xr while in the emergency department, results above.     1833 I spoke with Dr. Orellana of [facility] regarding the patient's presentation, findings, and plan of care.    1850 I rechecked the patient and discussed the results of his workup thus far.     Findings and plan explained to the Patient. Patient discharged home with instructions regarding supportive care, medications, and reasons to return. The importance of close follow-up was reviewed.     I personally reviewed the laboratory and imaging results with the Patient and answered all related questions prior to discharge.     Impression & Plan   Medical Decision  Making:  Todd S Aschoff is a 64 year old male with a recent suicide attempt via Warfarin overdose who presents for evaluation of left leg and shoulder pain after fall. Patient's leg leg is bruised and very taut. His shoulder has no deformity. He did not hit head and has no cervical process tenderness. Shoulder XR negative. XR tib/fib and ankle are also negative. Discussed taut skin and concerns for compartment syndrome with orthopedics, who felt he was stable for outpatient management. He does not have pain while sitting in bed and has not required pain management here in Emergency Department; thus chances of compartment syndrome are low. His INR is supratherapeutic. He has repeat testing tomorrow. I suggested he continue holding his Coumadin until instructed by INR clinic to change. Patient given ACE bandage and crutches for home use. Encouraged to ice, elevate, and wear compression sleeves. Return for worsening pain, fevers, other worrisome concerns.     Diagnosis:    ICD-10-CM    1. Supratherapeutic INR  R79.1    2. Pain of left lower leg  M79.662    3. Contusion of left lower leg, initial encounter  S80.12XA    4. Strain of left shoulder, initial encounter  S46.912A        Disposition:  Discharged to home.    Scribe Disclosure:  I, Donya Lindo, am serving as a scribe at 3:54 PM on 12/5/2020 to document services personally performed by Erika Mccarty PA-C based on my observations and the provider's statements to me.          Erika Mccarty PA-C  12/05/20 2272

## 2020-12-06 NOTE — DISCHARGE INSTRUCTIONS
Use crutches to get around.  Keep leg elevated and use compression sleeve.   Call primary care for follow up to ensure improvement.   Hold warfarin until seen by INR clinic tomorrow.

## 2020-12-07 ENCOUNTER — TELEPHONE (OUTPATIENT)
Dept: INTERNAL MEDICINE | Facility: CLINIC | Age: 65
End: 2020-12-07

## 2020-12-07 ENCOUNTER — DOCUMENTATION ONLY (OUTPATIENT)
Dept: INTERNAL MEDICINE | Facility: CLINIC | Age: 65
End: 2020-12-07

## 2020-12-07 DIAGNOSIS — Z79.01 LONG TERM CURRENT USE OF ANTICOAGULANT THERAPY: ICD-10-CM

## 2020-12-07 DIAGNOSIS — I48.20 CHRONIC ATRIAL FIBRILLATION (H): ICD-10-CM

## 2020-12-07 DIAGNOSIS — Z95.2 AORTIC VALVE PROSTHESIS PRESENT: ICD-10-CM

## 2020-12-07 DIAGNOSIS — I48.91 ATRIAL FIBRILLATION (H): ICD-10-CM

## 2020-12-07 LAB
ODV SERPL-MCNC: 48 NG/ML
VENLAFAXINE BLD-MCNC: 499 NG/ML

## 2020-12-07 RX ORDER — SIMVASTATIN 40 MG
TABLET ORAL
Qty: 90 TABLET | Refills: 0 | Status: SHIPPED | OUTPATIENT
Start: 2020-12-07 | End: 2021-04-05

## 2020-12-07 NOTE — TELEPHONE ENCOUNTER
Pt left voicemail on central line. Can't get to appointment today. Would like a call with warfarin dosing.    12/7/2020 10:25 AM    Oscar Briscoe RN

## 2020-12-07 NOTE — TELEPHONE ENCOUNTER
Spoke to him by phone. He couldn't get to lab appointment today due to lack of transportation. He can't drive due to pain in left leg and shoulder. He has crutches and is to keep his leg elevated. His wife can't drive, she recently broke her foot in 7 places. His daughter is having surgery today. His son-in-law works every day until 4:30pm and he could drive him, but today he has to  his wife from surgery. There are no lab appts after 4:10pm at  or after 4:30pm anywhere else. He has an appt with his PCP on Friday and has already arranged a ride for that. Lab appointment for INR scheduled for Friday before appointment with Dr. Ingram.    His leg swelling is slightly improved and the swelling in his foot is gone. Instructed him to measure his leg so he will be able to tell if it is getting larger.    Discussed dosing and follow-up with Nolvia Andrew PharmD BCACP, Anticoagulation Clinical Pharmacist.   He will return to his previous dose of 7.5mg Sun, 5mg all other days. Recommended INR tomorrow, but he can not come in.  INR was therapeutic on 12/5/2020 at the ER. On 11/27/2020 he had an overdose with 35 tabs of 5 mg of warfarin and was hospitalized.    Nova John RN

## 2020-12-08 ENCOUNTER — HOSPITAL ENCOUNTER (EMERGENCY)
Facility: CLINIC | Age: 65
Discharge: HOME | End: 2020-12-08
Attending: EMERGENCY MEDICINE | Admitting: EMERGENCY MEDICINE
Payer: COMMERCIAL

## 2020-12-08 VITALS
HEART RATE: 80 BPM | OXYGEN SATURATION: 95 % | DIASTOLIC BLOOD PRESSURE: 78 MMHG | SYSTOLIC BLOOD PRESSURE: 139 MMHG | BODY MASS INDEX: 41.3 KG/M2 | WEIGHT: 313 LBS | RESPIRATION RATE: 18 BRPM | TEMPERATURE: 97.1 F

## 2020-12-08 DIAGNOSIS — S80.12XA CONTUSION OF LEFT LOWER EXTREMITY, INITIAL ENCOUNTER: ICD-10-CM

## 2020-12-08 DIAGNOSIS — M79.662 PAIN OF LEFT LOWER LEG: ICD-10-CM

## 2020-12-08 DIAGNOSIS — R79.1 SUBTHERAPEUTIC INTERNATIONAL NORMALIZED RATIO (INR): ICD-10-CM

## 2020-12-08 DIAGNOSIS — R60.0 LEG EDEMA, LEFT: ICD-10-CM

## 2020-12-08 LAB
ANION GAP SERPL CALCULATED.3IONS-SCNC: 1 MMOL/L (ref 3–14)
BASOPHILS # BLD AUTO: 0 10E9/L (ref 0–0.2)
BASOPHILS NFR BLD AUTO: 0.5 %
BUN SERPL-MCNC: 12 MG/DL (ref 7–30)
CALCIUM SERPL-MCNC: 8.7 MG/DL (ref 8.5–10.1)
CHLORIDE SERPL-SCNC: 108 MMOL/L (ref 94–109)
CO2 SERPL-SCNC: 32 MMOL/L (ref 20–32)
CREAT SERPL-MCNC: 0.86 MG/DL (ref 0.66–1.25)
DIFFERENTIAL METHOD BLD: ABNORMAL
EOSINOPHIL # BLD AUTO: 0.1 10E9/L (ref 0–0.7)
EOSINOPHIL NFR BLD AUTO: 1.7 %
ERYTHROCYTE [DISTWIDTH] IN BLOOD BY AUTOMATED COUNT: 13.6 % (ref 10–15)
GFR SERPL CREATININE-BSD FRML MDRD: >90 ML/MIN/{1.73_M2}
GLUCOSE SERPL-MCNC: 170 MG/DL (ref 70–99)
HCT VFR BLD AUTO: 32.7 % (ref 40–53)
HGB BLD-MCNC: 10.9 G/DL (ref 13.3–17.7)
IMM GRANULOCYTES # BLD: 0 10E9/L (ref 0–0.4)
IMM GRANULOCYTES NFR BLD: 0.5 %
INR PPP: 1.6 (ref 0.86–1.14)
LYMPHOCYTES # BLD AUTO: 1.1 10E9/L (ref 0.8–5.3)
LYMPHOCYTES NFR BLD AUTO: 18.8 %
MCH RBC QN AUTO: 32.2 PG (ref 26.5–33)
MCHC RBC AUTO-ENTMCNC: 33.3 G/DL (ref 31.5–36.5)
MCV RBC AUTO: 97 FL (ref 78–100)
MONOCYTES # BLD AUTO: 0.5 10E9/L (ref 0–1.3)
MONOCYTES NFR BLD AUTO: 8.2 %
NEUTROPHILS # BLD AUTO: 4.2 10E9/L (ref 1.6–8.3)
NEUTROPHILS NFR BLD AUTO: 70.3 %
NRBC # BLD AUTO: 0 10*3/UL
NRBC BLD AUTO-RTO: 0 /100
PLATELET # BLD AUTO: 196 10E9/L (ref 150–450)
POTASSIUM SERPL-SCNC: 4.1 MMOL/L (ref 3.4–5.3)
RBC # BLD AUTO: 3.38 10E12/L (ref 4.4–5.9)
SODIUM SERPL-SCNC: 141 MMOL/L (ref 133–144)
WBC # BLD AUTO: 6 10E9/L (ref 4–11)

## 2020-12-08 PROCEDURE — 85025 COMPLETE CBC W/AUTO DIFF WBC: CPT | Performed by: EMERGENCY MEDICINE

## 2020-12-08 PROCEDURE — 85610 PROTHROMBIN TIME: CPT | Performed by: EMERGENCY MEDICINE

## 2020-12-08 PROCEDURE — 99283 EMERGENCY DEPT VISIT LOW MDM: CPT

## 2020-12-08 PROCEDURE — 36415 COLL VENOUS BLD VENIPUNCTURE: CPT | Performed by: EMERGENCY MEDICINE

## 2020-12-08 PROCEDURE — 80048 BASIC METABOLIC PNL TOTAL CA: CPT | Performed by: EMERGENCY MEDICINE

## 2020-12-08 ASSESSMENT — ENCOUNTER SYMPTOMS
ARTHRALGIAS: 1
CHILLS: 0
BACK PAIN: 0
FEVER: 0
NECK PAIN: 0
HEADACHES: 0
WOUND: 1

## 2020-12-08 NOTE — ED AVS SNAPSHOT
[facility] Emergency Dept  [address]  [facility] [address]  Phone: [phone]  Fax: [phone]                                    Todd S Aschoff   MRN: [id number]    Department: [facility] Emergency Dept   Date of Visit: 12/8/2020           After Visit Summary Signature Page    I have received my discharge instructions, and my questions have been answered. I have discussed any challenges I see with this plan with the nurse or doctor.    ..........................................................................................................................................  Patient/Patient Representative Signature      ..........................................................................................................................................  Patient Representative Print Name and Relationship to Patient    ..................................................               ................................................  Date                                   Time    ..........................................................................................................................................  Reviewed by Signature/Title    ...................................................              ..............................................  Date                                               Time          22EPIC Rev 08/18

## 2020-12-08 NOTE — PROGRESS NOTES
ANTICOAGULATION  MANAGEMENT: Discharge Review    Todd S Aschoff chart reviewed for anticoagulation continuity of care    Hospital Admission on 11/27-12/4/2020 for suicide attempt with warfarin overdose.  Emergency room visit on 12/5/2020 for left shoulder and left leg pain after a fall.      Discharge disposition: Home    Results:    Recent labs: (last 7 days)     11/30/20  1841 11/30/20  2357 12/01/20  0637 12/01/20  1231 12/01/20  1900 12/02/20  0019 12/02/20  0836 12/02/20  1829 12/03/20  0633 12/03/20  1919 12/04/20  0755 12/05/20  1640   INR 6.60* 7.60* 7.63* 7.86* 8.78* 8.41* 7.28* 7.28* 6.20* 5.19* 4.79* 3.24*     Anticoagulation inpatient management:     held warfarin due to high INRs after overdose     Anticoagulation discharge instructions:     Warfarin dosing: hold warfarin until INR on 12/7/2020   Bridging: No   INR goal change: No      Medication changes affecting anticoagulation: No    Additional factors affecting anticoagulation: Yes: inflammation and pain from fall.    Plan     Agree with discharge plan for follow up on 12/7/2020    Spoke with patient. See telephone encounter from today 12/7/2020.    Anticoagulation Calendar updated    Nova John RN

## 2020-12-08 NOTE — ED PROVIDER NOTES
History   Chief Complaint  Leg Swelling    HPI   Todd S Aschoff is a 64 year old male with a history of atrial fibrillation, anticoagulated on Coumadin, hyperlipidemia, and osteoarthritis, who presents for evaluation of lower left leg swelling. The patient reports that he experienced a mechanical fall three days ago, injuring his left leg and shoulder. He was evaluated in the ED that day and discharged home after a negative workup. His leg swelling initially went down; however, it has since gotten worse. He presents to the ED today for concern over potential infection to the leg and persistent swelling.  He denies any increased redness.  His shoulder is  at this time but not more so. Denies any headache, abdominal pain, neck pain, back pain, fevers, or chills.  He notes he was off Coumadin recently after overdosing on it intentionally with attempt at suicide.  He restarted it today.  He no longer feels suicidal and denies any plan or intent to harm himself.    Allergies  Gabapentin    Medications  Zyrtec  Flexeril  Valium  Aricept  Tenex  Synthroid/Levothroid  Cytomel  Namenda  Myrbetriq  Lyrica  Rythmol  Senokot-S/Pericolace  Zocor  Effexor-XR  Robitussin  Coumadin  Albuterol    Past Medical History  Alcohol dependence  Atrial fibrillation  BPH  Chronic low back pain  Coagulation disorder  Dissection of aorta, thoracic  Heart murmur  Depression  Hyperlipidemia  Osteoarthritis  Sciatica   Hypothyroidism   Lumbar radiculopathy  Tourette syndrome  Spinal stenosis  Radiculitis    Past Surgical History  Aortic valve replacement  C total hip arthroplasty x2  Cataract removal right eye  Cholecystectomy   Colonoscopy  Lumbar decompression  Fusion cervical anterior decompression  Explore spine, remove hardware  Cervical anterior fusion   Irrigation and debridement of spine, close wound  Removal of cyst of back  Tonsillectomy   Hamler tooth removal   Repair of TAA of graft  Elbow surgery    Family  History  Cerebrovascular disease  Prostate cancer  Dementia  Lymphoma   Diabetes  Sleep apnea  Hypertension    Social History  Smoking status: never  Alcohol use: no, stopped drinking ~2009  Drug use: no  PCP: Obdulio Ingram MD  Marital Status:   [2]     Review of Systems   Constitutional: Negative for chills and fever.   Cardiovascular: Positive for leg swelling (left leg).   Musculoskeletal: Positive for arthralgias (left shoulder). Negative for back pain and neck pain.   Skin: Positive for wound (left leg).   Neurological: Negative for headaches.   All other systems reviewed and are negative.    Physical Exam     Patient Vitals for the past 24 hrs:   BP Temp Temp src Pulse Resp SpO2 Weight   12/08/20 1415 -- -- -- 80 -- 95 % --   12/08/20 1400 139/78 -- -- 73 -- 96 % --   12/08/20 1345 126/79 -- -- 75 -- 98 % --   12/08/20 1332 -- -- -- -- -- -- 142 kg (313 lb)   12/08/20 1330 116/72 -- -- 82 -- 98 % --   12/08/20 1315 116/74 -- -- 80 -- 97 % --   12/08/20 1300 117/75 -- -- 91 -- 91 % --   12/08/20 1255 (!) 145/82 97.1  F (36.2  C) Temporal 85 18 99 % --     Physical Exam  General: Large adult male sitting upright  Eyes: PERRL, Conjunctive within normal limits  ENT: Moist mucous membranes  CV: Normal S1S2, no murmur, rub or gallop. Regular rate and rhythm  Resp:  Normal respiratory effort.  GI: Abdomen is soft, nontender and nondistended.  MSK: Pitting edema of the LLE into the foot. Diffusely tender in the left lower leg. Soft muscles to palpation. Normal active range of motion.  Skin: Warm and dry. Diffuse ecchymoses in the left medial thigh and left lower leg. Superficial open wounds over the left medial calf. No active bleeding. No surrounding erythema.   Neuro: Alert and oriented. Responds appropriately to all questions and commands. No focal findings appreciated. Normal muscle tone.  Psych: Normal mood and affect. Pleasant.    Emergency Department Course   Laboratory:  Laboratory findings were  communicated with the patient who voiced understanding of the findings.    CBC: WBC: 6.0, HGB: 10.9 (L), PLT: 196  BMP: Glucose 170 (H), Anion gap 1 (L), o/w WNL (Creatinine: 0.86)  INR: 1.60 (H)    Emergency Department Course:  Past medical records, nursing notes, and vitals reviewed.    1305 I physically examined the patient as documented above.     IV was inserted and blood was drawn for laboratory testing, results above.    1404 I rechecked the patient and discussed the findings of their workup thus far.  He denies any new concerns.  I discussed the need to continue with his Coumadin dosing.  Recommend return if he should develop any of his previous suicidal thoughts or worsening of symptoms.     Findings and plan explained to the Patient. Patient discharged home with instructions regarding supportive care, medications, and reasons to return. The importance of close follow-up was reviewed.     I personally reviewed the laboratory results with the Patient and answered all related questions prior to discharge.     Impression & Plan   Medical Decision Making:  Todd Aschoff is a 64-year-old male with history of atrial fibrillation on Coumadin and recent attempted suicide by ingestion of Coumadin who presents to the emergency department concerns for ongoing pain and swelling in his leg.  He is not currently suicidal and seems appropriate.  His leg exam is not consistent with compartment syndrome.  As he is anticoagulated, even with slightly subtherapeutic, I doubt DVT.  Because first dose again today of Coumadin will continue dosing.  He has no chest pain or shortness of breath to suggest pulmonary embolism.  He has no signs of infection which is his primary concern.  His white count is normal, he is afebrile and clinically there is no sign of cellulitis or deeper tissue infection.  He had no repeat trauma so repeat imaging did not seem indicated.  He is recommended to continue with gentle compression elevation and  rest.  He ambulated here and is recommended to do so with assistance only as needed, mostly resting the leg.    Diagnosis:    ICD-10-CM    1. Pain of left lower leg  M79.662    2. Leg edema, left  R60.0    3. Contusion of left lower extremity, initial encounter  S80.12XA    4. Subtherapeutic international normalized ratio (INR)  R79.1      Disposition:  Discharged to home.    Scribe Disclosure:  I, Jose J Buchanan, am serving as a scribe at 1:00 PM on 12/8/2020 to document services personally performed by Almita Jordan MD based on my observations and the provider's statements to me.      Almita Jordan MD  12/08/20 2308

## 2020-12-08 NOTE — ED TRIAGE NOTES
Pt with left lower leg swelling, pt with wounds after a fall and states pain relieved some after taking TEDs off. ABC's intact, alert and oriented X3. Denies SOB.

## 2020-12-08 NOTE — DISCHARGE INSTRUCTIONS
There is no current sign of infection.  No current sign of compartment syndrome.  If symptoms worsen return immediately to the emergency department.

## 2020-12-09 ENCOUNTER — TELEPHONE (OUTPATIENT)
Dept: ANTICOAGULATION | Facility: CLINIC | Age: 65
End: 2020-12-09

## 2020-12-09 DIAGNOSIS — Z95.2 AORTIC VALVE PROSTHESIS PRESENT: ICD-10-CM

## 2020-12-09 DIAGNOSIS — I48.20 CHRONIC ATRIAL FIBRILLATION (H): ICD-10-CM

## 2020-12-09 DIAGNOSIS — Z79.01 LONG TERM CURRENT USE OF ANTICOAGULANT THERAPY: ICD-10-CM

## 2020-12-09 DIAGNOSIS — I48.91 ATRIAL FIBRILLATION (H): ICD-10-CM

## 2020-12-09 NOTE — TELEPHONE ENCOUNTER
ANTICOAGULATION  MANAGEMENT: Discharge Review    Todd S Aschoff chart reviewed for anticoagulation continuity of care    Emergency room visit on 12/8/20 for leg swelling and pain.    Discharge disposition: Home    Results:    Recent labs: (last 7 days)     12/02/20  1829 12/03/20  0633 12/03/20  1919 12/04/20  0755 12/05/20  1640 12/08/20  1328   INR 7.28* 6.20* 5.19* 4.79* 3.24* 1.60*     Anticoagulation inpatient management:     not applicable     Anticoagulation discharge instructions:     Warfarin dosing: home regimen continued   Bridging: No   INR goal change: No      Medication changes affecting anticoagulation: No    Additional factors affecting anticoagulation: Yes: recent overdose of warfarin on 11/27.    Plan     Recommend to check INR on 12/11 as previously scheduled.  Per Nolvia Andrew's note, patient should start on Lovenox bridging since INR was sub therapeutic.    left message for patient to call INR clinic to discuss.  Transfer him to me at [phone].    No adjustment to Anticoagulation Calendar was required    Amanda Wheeler RN

## 2020-12-09 NOTE — TELEPHONE ENCOUNTER
Tried calling Nicko again, but had to leave a message for him to call the INR clinic.  Since we were not able to reach patient today to discuss starting lovenox as listed below, advised patient to take 7.5 mg of his warfarin tonight (discussed this again with Nolvia Andrew, Pharmacist).

## 2020-12-09 NOTE — TELEPHONE ENCOUNTER
Estimated Creatinine Clearance: 130.2 mL/min (based on SCr of 0.86 mg/dL).    OK for full dose Lovenox 150mg BID    Recommend starting Lovenox now, until next INR check 12/11/2020.  Patient has higher goal range due to [employer] [facility] aortic valve placed in 1992 and additional risk factor of afib.  Previous INR 12/5 was 3.24, has been holding due to recent out of range high with intentional overdose of warfarin.    Does not appear US done on legs with either visit, or D-dimer was checked.  Forward to PCP who will be seeing patient 12/11/2020, and can further assess if this is warranted.    Nolvia Andrew, PharmD BCACP  Anticoagulation Clinical Pharmacist

## 2020-12-10 NOTE — TELEPHONE ENCOUNTER
Spoke to Nicko by phone.  He didn't get any of the messages from yesterday or today.   He has 150mg lovenox at home so doesn't need an prescription.  Told him to take lovenox tonight and tomorrow morning. He didn't get the message to take 7.5mg last night so he only took 5mg. Told him to take 7.5mg tonight.   Nolvia Andrew, PharmD BCACP, Anticoagulation Clinical Pharmacist in agreement with dosing and follow-up.    INR tomorrow at 2pm.     Nova John RN

## 2020-12-10 NOTE — TELEPHONE ENCOUNTER
Unable to reach Nicko by phone.  Left a message on voicemail to call back to discuss INR and dosing.    Nova John RN

## 2020-12-11 ENCOUNTER — OFFICE VISIT (OUTPATIENT)
Dept: INTERNAL MEDICINE | Facility: CLINIC | Age: 65
End: 2020-12-11
Payer: COMMERCIAL

## 2020-12-11 ENCOUNTER — ANTICOAGULATION THERAPY VISIT (OUTPATIENT)
Dept: NURSING | Facility: CLINIC | Age: 65
End: 2020-12-11
Payer: COMMERCIAL

## 2020-12-11 VITALS
OXYGEN SATURATION: 100 % | HEART RATE: 86 BPM | DIASTOLIC BLOOD PRESSURE: 60 MMHG | BODY MASS INDEX: 41.46 KG/M2 | WEIGHT: 312.8 LBS | HEIGHT: 73 IN | RESPIRATION RATE: 16 BRPM | TEMPERATURE: 97.7 F | SYSTOLIC BLOOD PRESSURE: 102 MMHG

## 2020-12-11 DIAGNOSIS — M54.50 CHRONIC BILATERAL LOW BACK PAIN WITHOUT SCIATICA: ICD-10-CM

## 2020-12-11 DIAGNOSIS — E66.01 MORBID OBESITY, UNSPECIFIED OBESITY TYPE (H): ICD-10-CM

## 2020-12-11 DIAGNOSIS — G89.29 CHRONIC BILATERAL LOW BACK PAIN WITHOUT SCIATICA: ICD-10-CM

## 2020-12-11 DIAGNOSIS — I48.20 CHRONIC ATRIAL FIBRILLATION (H): ICD-10-CM

## 2020-12-11 DIAGNOSIS — F33.42 MAJOR DEPRESSIVE DISORDER, RECURRENT EPISODE, IN FULL REMISSION (H): ICD-10-CM

## 2020-12-11 DIAGNOSIS — I48.91 ATRIAL FIBRILLATION (H): ICD-10-CM

## 2020-12-11 DIAGNOSIS — I48.91 ATRIAL FIBRILLATION, UNSPECIFIED TYPE (H): ICD-10-CM

## 2020-12-11 DIAGNOSIS — E03.9 ACQUIRED HYPOTHYROIDISM: ICD-10-CM

## 2020-12-11 DIAGNOSIS — F10.21 ALCOHOL DEPENDENCE IN REMISSION (H): ICD-10-CM

## 2020-12-11 DIAGNOSIS — T50.912A SUICIDE ATTEMPT BY MULTIPLE DRUG OVERDOSE, INITIAL ENCOUNTER (H): ICD-10-CM

## 2020-12-11 DIAGNOSIS — Z79.01 LONG TERM CURRENT USE OF ANTICOAGULANT THERAPY: ICD-10-CM

## 2020-12-11 DIAGNOSIS — Z95.2 AORTIC VALVE PROSTHESIS PRESENT: ICD-10-CM

## 2020-12-11 DIAGNOSIS — E78.5 HYPERLIPIDEMIA LDL GOAL <130: ICD-10-CM

## 2020-12-11 DIAGNOSIS — Z23 NEED FOR PROPHYLACTIC VACCINATION AND INOCULATION AGAINST INFLUENZA: ICD-10-CM

## 2020-12-11 DIAGNOSIS — F33.1 MAJOR DEPRESSIVE DISORDER, RECURRENT EPISODE, MODERATE (H): Primary | ICD-10-CM

## 2020-12-11 LAB
CAPILLARY BLOOD COLLECTION: NORMAL
INR PPP: 2.6 (ref 0.86–1.14)

## 2020-12-11 PROCEDURE — 99207 PR NO CHARGE NURSE ONLY: CPT

## 2020-12-11 PROCEDURE — 90682 RIV4 VACC RECOMBINANT DNA IM: CPT | Performed by: INTERNAL MEDICINE

## 2020-12-11 PROCEDURE — 90471 IMMUNIZATION ADMIN: CPT | Performed by: INTERNAL MEDICINE

## 2020-12-11 PROCEDURE — 99214 OFFICE O/P EST MOD 30 MIN: CPT | Mod: 25 | Performed by: INTERNAL MEDICINE

## 2020-12-11 PROCEDURE — 36415 COLL VENOUS BLD VENIPUNCTURE: CPT | Performed by: INTERNAL MEDICINE

## 2020-12-11 PROCEDURE — 80061 LIPID PANEL: CPT | Performed by: INTERNAL MEDICINE

## 2020-12-11 PROCEDURE — 85610 PROTHROMBIN TIME: CPT | Performed by: INTERNAL MEDICINE

## 2020-12-11 RX ORDER — VENLAFAXINE HYDROCHLORIDE 150 MG/1
300 CAPSULE, EXTENDED RELEASE ORAL DAILY
Qty: 180 CAPSULE | Refills: 3 | Status: SHIPPED | OUTPATIENT
Start: 2020-12-11 | End: 2021-01-20

## 2020-12-11 RX ORDER — GUANFACINE 1 MG/1
1 TABLET ORAL AT BEDTIME
Qty: 90 TABLET | Refills: 0 | Status: SHIPPED | OUTPATIENT
Start: 2020-12-11 | End: 2021-04-05

## 2020-12-11 RX ORDER — LEVOTHYROXINE SODIUM 150 UG/1
150 TABLET ORAL DAILY
Qty: 90 TABLET | Refills: 3 | Status: SHIPPED | OUTPATIENT
Start: 2020-12-11 | End: 2022-12-13

## 2020-12-11 RX ORDER — PREGABALIN 100 MG/1
100 CAPSULE ORAL 3 TIMES DAILY
Qty: 270 CAPSULE | Refills: 0 | Status: ON HOLD | OUTPATIENT
Start: 2020-12-11 | End: 2021-02-07

## 2020-12-11 ASSESSMENT — MIFFLIN-ST. JEOR: SCORE: 2267.73

## 2020-12-11 ASSESSMENT — PATIENT HEALTH QUESTIONNAIRE - PHQ9: SUM OF ALL RESPONSES TO PHQ QUESTIONS 1-9: 7

## 2020-12-11 NOTE — PATIENT INSTRUCTIONS
Dr Ingram has refilled all of the needed medications, including Effexor  mg, (two each day).     Follow up with [facility] (therapist and psychiatrist).     Someone will contact you from [facility] cardiology to see them again about propafenone and previous atrial fibrillation.     See Dr Ingram for a physical exam in the next 2-3 months.

## 2020-12-11 NOTE — PROGRESS NOTES
Chart reviewed, due to restart of warfarin and ongoing inflammation that could elevate INR, OK to stop Lovenox now that >2.5 and re-check INR in one week.    Nolvia Andrew, PharmD BCACP  Anticoagulation Clinical Pharmacist

## 2020-12-11 NOTE — PROGRESS NOTES
ANTICOAGULATION FOLLOW-UP CLINIC VISIT    Patient Name:  Todd S Aschoff  Date:  2020  Contact Type:  Telephone    SUBJECTIVE:  Patient Findings     Positives:  Emergency department visit (ER visit on  and  for leg contusion and swelling after a fall--no intervention was necessary and ), Missed doses (Intentional hold -20 due to supra INR secondary to purposely ingesting too much warfarinl.), Change in medications (See Nolvia's note, OK to stop Lovenox), Hospital admission (Hospitalized -20 for suicide attempt by intentional overdosing with warfarin and diazepam.)        Clinical Outcomes     Negatives:  Major bleeding event, Thromboembolic event, Anticoagulation-related hospital admission, Anticoagulation-related ED visit, Anticoagulation-related fatality           OBJECTIVE    Recent labs: (last 7 days)     20  1402   INR 2.60*       ASSESSMENT / PLAN  INR assessment THER    Recheck INR In: 1 WEEK    INR Location Clinic      Anticoagulation Summary  As of 2020    INR goal:  2.5-3.5   TTR:  53.5 % (11.9 mo)   INR used for dosin.60 (2020)   Warfarin maintenance plan:  7.5 mg (5 mg x 1.5) every Sun; 5 mg (5 mg x 1) all other days   Full warfarin instructions:  7.5 mg every Sun; 5 mg all other days   Weekly warfarin total:  37.5 mg   Plan last modified:  Angely Smalls RN (2020)   Next INR check:  2020   Priority:  High   Target end date:  Indefinite    Indications    Aortic valve prosthesis present [Z95.2]  Atrial fibrillation (H) [I48.91]  Long term current use of anticoagulant therapy [Z79.01]  Chronic atrial fibrillation (H) [I48.20]             Anticoagulation Episode Summary     INR check location:      Preferred lab:      Send INR reminders to:  AIDA GUERRERO    Comments:  5mg tabs / CALENDAR / needs bridging. may leave DVM or speak with Paul, per signed consent      Anticoagulation Care Providers     Provider Role Specialty Phone number     Obdulio Ingram MD Referring Internal Medicine [phone]            See the Encounter Report to view Anticoagulation Flowsheet and Dosing Calendar (Go to Encounters tab in chart review, and find the Anticoagulation Therapy Visit)        Angely Smalls RN

## 2020-12-11 NOTE — PROGRESS NOTES
"Subjective     Todd S Aschoff is a 64 year old male who presents to clinic today for the following health issues:    HPI         ED/UC Followup:    Facility: ED [facility]  Date of visit: 12/8/20 and 12/5/20  Reason for visit: leg edema  Current Status: less edema       Hospital Follow up:   Hospitalized At [facility] for depression/overdose ingestion with suicidal intent.  Admit Date: 11/27/2020  Discharge Date: 12/3/2020.     Has followed with [facility] in [place]. Needs to establish follow up with a psychiatrist--has seen Yadi Rivera recently with whom he \"didn't hit it off\".     Reviewed his hospital course at [facility].   He saw Dr Veronica for psychiatry while hospitalized, who advised that the patient's Effexor XR be raised to 300 mg daily.    He is offered refills of Effexor XR at this dose.     His TSH was found to be in normal range during this hospitalization.     We also reviewed his ED visits on 12/5 and 12/8 for left leg swelling and bullae.       Hyperlipidemia Follow-Up      Are you regularly taking any medication or supplement to lower your cholesterol?   Yes- Simvastatin    Are you having muscle aches or other side effects that you think could be caused by your cholesterol lowering medication?  No      How many servings of fruits and vegetables do you eat daily?  0-1    On average, how many sweetened beverages do you drink each day (Examples: soda, juice, sweet tea, etc.  Do NOT count diet or artificially sweetened beverages)?   0    How many days per week do you exercise enough to make your heart beat faster? 3 or less    How many minutes a day do you exercise enough to make your heart beat faster? 9 or less    How many days per week do you miss taking your medication? 0    The patient continues to abstain from alcohol intake.     We discussed that I would like for him to re-establish follow up with cardiology to review his propafenone medication. He has a history of atrial " "fibrillation but found propafenone to reduce palpitations.   He did see Dr Trevizo in the past, some time ago.     Past medical, family and social histories as well as medications reviewed and updated as needed.    Review of Systems   REVIEW OF SYSTEMS: The following systems have been completely reviewed and are negative except as noted above:   Constitutional, respiratory, cardiovascular,  musculoskeletal, dermatologic, endocrine, psychiatric, and neurologic systems.        Objective    /60 (BP Location: Left arm, Patient Position: Sitting, Cuff Size: Adult Large)   Pulse 86   Temp 97.7  F (36.5  C) (Oral)   Resp 16   Ht 1.854 m (6' 1\")   Wt 141.9 kg (312 lb 12.8 oz)   SpO2 100%   BMI 41.27 kg/m    Body mass index is 41.27 kg/m .  Physical Exam   GENERAL: healthy, alert and no distress  RESP: lungs clear to auscultation - no rales, rhonchi or wheezes  CV: regular rate and rhythm, normal S1 S2, no S3 or S4, no murmur, click or rub,   MS: Left lower leg edema--stocking not removed.   NEURO: Normal strength and tone, mentation intact and speech normal  PSYCH: mentation appears normal, affect normal/bright       Assessment & Plan   (F33.1) Major depressive disorder, recurrent episode, moderate (H)  (primary encounter diagnosis)  (T50.912A) Suicide attempt by multiple drug overdose, initial encounter (H)  Comment: Refilled Rx's for Effexor XR and ganfacine. Urged f/u with psychiatry with [facility] in [place].   Plan: guanFACINE (TENEX) 1 MG tablet, venlafaxine         (EFFEXOR-XR) 150 MG 24 hr capsule          (F10.21) Alcohol dependence in remission (H)  Comment: Continue abstinence.    (M54.5,  G89.29) Chronic bilateral low back pain without sciatica  Comment: Refilled Lyrica.   Plan: pregabalin (LYRICA) 100 MG capsule          (Z95.2) Aortic valve prosthesis present  (I48.91) Atrial fibrillation, unspecified type (H)  Comment: Advised follow up with cardiology.   Plan: CARDIOLOGY EVAL ADULT REFERRAL        " "  (E66.01) Obesity, BMI >35 with comorbidities  Comment: Try to work on eating/activity habits.     (E78.5) Hyperlipidemia LDL goal <130  Comment: Update nonfasting lipids today--due for retest.   Plan: Lipid panel reflex to direct LDL Non-fasting          (E03.9) Acquired hypothyroidism  Comment: Euthyroid clinically and by recent labs  Plan: levothyroxine (SYNTHROID/LEVOTHROID) 150 MCG         tablet          (Z23) Need for prophylactic vaccination and inoculation against influenza  Plan: INFLUENZA QUAD, RECOMBINANT, P-FREE (RIV4)         (FLUBLOCK) [78556]             BMI:   Estimated body mass index is 41.27 kg/m  as calculated from the following:    Height as of this encounter: 1.854 m (6' 1\").    Weight as of this encounter: 141.9 kg (312 lb 12.8 oz).   Weight management plan: Discussed healthy diet and exercise guidelines       Depression Screening Follow Up    PHQ 12/11/2020   PHQ-9 Total Score 7   Q9: Thoughts of better off dead/self-harm past 2 weeks Several days       Patient Instructions   Dr Ingram has refilled all of the needed medications, including Effexor  mg, (two each day).     Follow up with [facility] (therapist and psychiatrist).     Someone will contact you from [facility] cardiology to see them again about propafenone and previous atrial fibrillation.     See Dr Ingram for a physical exam in the next 2-3 months.         Return in about 3 months (around 3/11/2021) for Physical Exam.    Obdulio Ingram MD, MD  [facility]      "

## 2020-12-12 LAB
CHOLEST SERPL-MCNC: 226 MG/DL
HDLC SERPL-MCNC: 39 MG/DL
LDLC SERPL CALC-MCNC: 149 MG/DL
NONHDLC SERPL-MCNC: 187 MG/DL
TRIGL SERPL-MCNC: 192 MG/DL

## 2020-12-28 ENCOUNTER — ANTICOAGULATION THERAPY VISIT (OUTPATIENT)
Dept: NURSING | Facility: CLINIC | Age: 65
End: 2020-12-28
Payer: COMMERCIAL

## 2020-12-28 ENCOUNTER — TELEPHONE (OUTPATIENT)
Dept: INTERNAL MEDICINE | Facility: CLINIC | Age: 65
End: 2020-12-28

## 2020-12-28 DIAGNOSIS — Z79.899 MEDICATION MANAGEMENT: ICD-10-CM

## 2020-12-28 DIAGNOSIS — I48.20 CHRONIC ATRIAL FIBRILLATION (H): ICD-10-CM

## 2020-12-28 DIAGNOSIS — I48.91 ATRIAL FIBRILLATION (H): ICD-10-CM

## 2020-12-28 DIAGNOSIS — E03.9 ACQUIRED HYPOTHYROIDISM: ICD-10-CM

## 2020-12-28 DIAGNOSIS — E78.5 HYPERLIPIDEMIA LDL GOAL <130: ICD-10-CM

## 2020-12-28 DIAGNOSIS — Z79.01 LONG TERM CURRENT USE OF ANTICOAGULANT THERAPY: ICD-10-CM

## 2020-12-28 DIAGNOSIS — Z95.2 AORTIC VALVE PROSTHESIS PRESENT: ICD-10-CM

## 2020-12-28 LAB — INR PPP: 2.4 (ref 0.86–1.14)

## 2020-12-28 PROCEDURE — 99207 PR NO CHARGE NURSE ONLY: CPT

## 2020-12-28 PROCEDURE — 85610 PROTHROMBIN TIME: CPT | Performed by: INTERNAL MEDICINE

## 2020-12-28 PROCEDURE — 36416 COLLJ CAPILLARY BLOOD SPEC: CPT | Performed by: INTERNAL MEDICINE

## 2020-12-28 NOTE — LETTER
[facility]  [address] [place]  [place] [address]  [phone]    2021    Regarding:  Todd S Aschoff  [address]  [place] [address]  : 1955         To whom it may concern:     This letter is in urgent appeal of recent denial of coverage for Effexor XR at the prescribed dose of 300 mg daily.     Todd S Aschoff suffers from the following psychiatric diagnoses:  1.  Major depressive disorder, recurrent, severe, without psychosis.   2.  General anxiety disorder.   3.  Status post serious overdose attempt with prescribed medication prescribed warfarin and diazepam.     Effexor XR at a dose of 300 mg daily was specifically recommended and prescribed by the patient's inpatient psychiatrist, Aurelio Veronica, during his recent hospitalization for depression and overdose ingestion (hospitalized -12/3/2020).    My urgent concern is that further interruption of therapy may result in significant relapse of symptoms.    If you have any further questions or need for clarification, please contact our office.    Sincerely,        Obdulio Ingram MD

## 2020-12-28 NOTE — TELEPHONE ENCOUNTER
Prior Authorization Retail Medication Request    Medication/Dose: venlafaxine (EFFEXOR-XR) 150 MG 24 hr capsule    ICD code (if different than what is on RX):     Previously Tried and Failed:     Rationale:       Insurance Name:     Insurance ID:         Pharmacy Information (if different than what is on RX)  Name:     Phone:        M Key: [id number]

## 2020-12-28 NOTE — TELEPHONE ENCOUNTER
Central Prior Authorization Team   Phone: [phone]      PA Initiation    Medication: venlafaxine (EFFEXOR-XR) 150 MG 24 hr capsule-Initiated  Insurance Company: [employer] Clinical [employer] - Phone [phone] Fax [phone]  Pharmacy Filling the Rx: [id number] - [place], [place] - [address]  Filling Pharmacy Phone: [phone]  Filling Pharmacy Fax:    Start Date: 12/28/2020

## 2020-12-28 NOTE — PROGRESS NOTES
ANTICOAGULATION FOLLOW-UP     Patient Name:  Todd S Aschoff  Date:  2020  Contact Type:  Telephone    SUBJECTIVE:  Patient Findings     Positives:  Change in medications (Started Folic acid and memantine on 12/3/2020. Per Micromedex: No interactions with warfarin found.)    Comments:  The patient was assessed for   diet, medication,   missed or extra doses,   bruising or bleeding,   with no problem findings.  No questions or concerns.  Reviewed previous warfarin dosing with patient.  He took warfarin as instructed.    INR is subtherapeutic today at 2.4. Goal is 2.5-3.5  Last INR was 2.6.    Patient will increase weekly maintenance dose total by 6.7%.   Follow up in 2 weeks.         Clinical Outcomes     Comments:  The patient was assessed for   diet, medication,   missed or extra doses,   bruising or bleeding,   with no problem findings.  No questions or concerns.  Reviewed previous warfarin dosing with patient.  He took warfarin as instructed.    INR is subtherapeutic today at 2.4. Goal is 2.5-3.5  Last INR was 2.6.    Patient will increase weekly maintenance dose total by 6.7%.   Follow up in 2 weeks.            OBJECTIVE    Recent labs: (last 7 days)     20  1616   INR 2.40*       ASSESSMENT / PLAN  INR assessment SUB    Recheck INR In: 2 WEEKS    INR Location Clinic lab     Anticoagulation Summary  As of 2020    INR goal:  2.5-3.5   TTR:  51.1 % (11.9 mo)   INR used for dosin.40 (2020)   Warfarin maintenance plan:  7.5 mg (5 mg x 1.5) every Sun, Thu; 5 mg (5 mg x 1) all other days   Full warfarin instructions:  7.5 mg every Sun, Thu; 5 mg all other days   Weekly warfarin total:  40 mg   Plan last modified:  Nova John RN (2020)   Next INR check:  2021   Priority:  High   Target end date:  Indefinite    Indications    Aortic valve prosthesis present [Z95.2]  Atrial fibrillation (H) [I48.91]  Long term current use of anticoagulant therapy [Z79.01]  Chronic atrial  fibrillation (H) [I48.20]             Anticoagulation Episode Summary     INR check location:      Preferred lab:      Send INR reminders to:  AIDA GUERRERO    Comments:  5mg tabs / CALENDAR / needs bridging. may leave DVM or speak with Paul, per signed consent      Anticoagulation Care Providers     Provider Role Specialty Phone number    Obdulio Ingram MD Referring Internal Medicine [phone]            See the Encounter Report to view Anticoagulation Flowsheet and Dosing Calendar (Go to Encounters tab in chart review, and find the Anticoagulation Therapy Visit)        Dosage adjustment made based on physician directed care plan.      Nova John RN

## 2020-12-28 NOTE — TELEPHONE ENCOUNTER
Reason for Call:  Medication or medication refill: Medication Refill    Do you use a [facility]?  Name of the pharmacy and phone number for the current request:  [id number]    Name of the medication requested: Myrbetriq    Other request: N/a    Can we leave a detailed message on this number? YES    Phone number patient can be reached at: Home number on file [phone] (home)    Best Time: Anytime    Call taken on 12/28/2020 at 5:20 PM by Prerna Allen

## 2020-12-29 DIAGNOSIS — N32.81 OVERACTIVE BLADDER: ICD-10-CM

## 2020-12-29 RX ORDER — MIRABEGRON 50 MG/1
TABLET, FILM COATED, EXTENDED RELEASE ORAL
Qty: 90 TABLET | Refills: 0 | Status: CANCELLED | OUTPATIENT
Start: 2020-12-29

## 2020-12-29 NOTE — TELEPHONE ENCOUNTER
PRIOR AUTHORIZATION DENIED    Medication: venlafaxine (EFFEXOR-XR) 150 MG 24 hr capsule-DENIED    Denial Date: 12/28/2020    Denial Rational: Dose is more than the highest quantity covered by patient's pharmacy plan.  Patient can get up to 1 capsule per day.      Appeal Information:

## 2020-12-29 NOTE — TELEPHONE ENCOUNTER
Last prescription sent 12/2/20. Left detailed message advising patient contact pharmacy and appointment needed.

## 2020-12-31 DIAGNOSIS — N32.81 OVERACTIVE BLADDER: ICD-10-CM

## 2020-12-31 RX ORDER — MIRABEGRON 50 MG/1
50 TABLET, EXTENDED RELEASE ORAL DAILY
Qty: 90 TABLET | Refills: 3 | Status: SHIPPED | OUTPATIENT
Start: 2020-12-31 | End: 2021-06-18

## 2020-12-31 NOTE — TELEPHONE ENCOUNTER
"Requested Prescriptions   Pending Prescriptions Disp Refills     MYRBETRIQ 50 MG 24 hr tablet [Pharmacy Med Name: Myrbetriq Oral Tablet Extended Release 24 Hour 50 MG] 90 tablet 0     Sig: Take 1 tablet (50 mg) by mouth daily, NEED APPOINTMENT.       Beta 3 Adrenergic Agonists Passed - 12/29/2020  6:27 PM        Passed - Most recent BP less than 140/90 on record     BP Readings from Last 3 Encounters:   12/11/20 102/60   12/08/20 139/78   12/05/20 (!) 141/102                 Passed - Recent or future visit with authorizing provider's specialty     Patient has had an office visit with the authorizing provider or a provider within the authorizing providers department within the previous 12 mos or has a future within next 30 days. See \"Patient Info\" tab in inbasket, or \"Choose Columns\" in Meds & Orders section of the refill encounter.              Passed - Medication is active on med list        Passed - Most recent eGFR greater than or equal to 30 within past 12 months     Recent Labs   Lab Test 12/08/20  1328   GFRESTIMATED >90   GFRESTBLACK >90             Passed - Patient is of age 18 years or older           Last office visit 12/11/20.  Prescription approved per [facility] Refill Protocol.  Amanda Wheeler RN    "

## 2021-01-05 RX ORDER — MIRABEGRON 50 MG/1
TABLET, FILM COATED, EXTENDED RELEASE ORAL
Qty: 90 TABLET | Refills: 0 | OUTPATIENT
Start: 2021-01-05

## 2021-01-07 ENCOUNTER — TELEPHONE (OUTPATIENT)
Dept: INTERNAL MEDICINE | Facility: CLINIC | Age: 66
End: 2021-01-07

## 2021-01-07 DIAGNOSIS — G31.84 MILD COGNITIVE IMPAIRMENT: ICD-10-CM

## 2021-01-07 NOTE — TELEPHONE ENCOUNTER
Prior Authorization Retail Medication Request    Medication/Dose: VENLAFAXINE HCL  MG ER CAPSULES  ICD code (if different than what is on RX):    Previously Tried and Failed:    Rationale:      Insurance Name:  [facility]  Insurance ID:  [id number]       Pharmacy Information (if different than what is on RX)  Name:  [facility]  Phone:  [phone]

## 2021-01-08 DIAGNOSIS — N32.81 OVERACTIVE BLADDER: ICD-10-CM

## 2021-01-09 ENCOUNTER — APPOINTMENT (OUTPATIENT)
Dept: CT IMAGING | Facility: CLINIC | Age: 66
End: 2021-01-09
Attending: EMERGENCY MEDICINE
Payer: COMMERCIAL

## 2021-01-09 ENCOUNTER — HOSPITAL ENCOUNTER (INPATIENT)
Facility: CLINIC | Age: 66
LOS: 4 days | Discharge: HOME | End: 2021-01-13
Attending: EMERGENCY MEDICINE | Admitting: INTERNAL MEDICINE
Payer: COMMERCIAL

## 2021-01-09 DIAGNOSIS — L03.116 LEFT LEG CELLULITIS: ICD-10-CM

## 2021-01-09 DIAGNOSIS — S81.802A WOUND OF LEFT LOWER EXTREMITY, INITIAL ENCOUNTER: Primary | ICD-10-CM

## 2021-01-09 DIAGNOSIS — Z95.2 AORTIC VALVE PROSTHESIS PRESENT: ICD-10-CM

## 2021-01-09 DIAGNOSIS — Z79.01 LONG TERM CURRENT USE OF ANTICOAGULANT THERAPY: ICD-10-CM

## 2021-01-09 DIAGNOSIS — T14.8XXA BLEEDING FROM WOUND: ICD-10-CM

## 2021-01-09 DIAGNOSIS — S80.12XA HEMATOMA OF LEFT LOWER EXTREMITY, INITIAL ENCOUNTER: ICD-10-CM

## 2021-01-09 LAB
ALBUMIN SERPL-MCNC: 3.4 G/DL (ref 3.4–5)
ALP SERPL-CCNC: 96 U/L (ref 40–150)
ALT SERPL W P-5'-P-CCNC: 23 U/L (ref 0–70)
ANION GAP SERPL CALCULATED.3IONS-SCNC: 1 MMOL/L (ref 3–14)
APTT PPP: 46 SEC (ref 22–37)
AST SERPL W P-5'-P-CCNC: 28 U/L (ref 0–45)
BASOPHILS # BLD AUTO: 0 10E9/L (ref 0–0.2)
BASOPHILS NFR BLD AUTO: 0.5 %
BILIRUB SERPL-MCNC: 1 MG/DL (ref 0.2–1.3)
BUN SERPL-MCNC: 13 MG/DL (ref 7–30)
CALCIUM SERPL-MCNC: 8.7 MG/DL (ref 8.5–10.1)
CHLORIDE SERPL-SCNC: 105 MMOL/L (ref 94–109)
CO2 SERPL-SCNC: 34 MMOL/L (ref 20–32)
CREAT BLD-MCNC: 1 MG/DL (ref 0.66–1.25)
CREAT SERPL-MCNC: 1.02 MG/DL (ref 0.66–1.25)
DIFFERENTIAL METHOD BLD: NORMAL
EOSINOPHIL # BLD AUTO: 0.1 10E9/L (ref 0–0.7)
EOSINOPHIL NFR BLD AUTO: 2.2 %
ERYTHROCYTE [DISTWIDTH] IN BLOOD BY AUTOMATED COUNT: 13.9 % (ref 10–15)
GFR SERPL CREATININE-BSD FRML MDRD: 75 ML/MIN/{1.73_M2}
GFR SERPL CREATININE-BSD FRML MDRD: 77 ML/MIN/{1.73_M2}
GLUCOSE SERPL-MCNC: 146 MG/DL (ref 70–99)
HCT VFR BLD AUTO: 47.3 % (ref 40–53)
HGB BLD-MCNC: 15.6 G/DL (ref 13.3–17.7)
IMM GRANULOCYTES # BLD: 0 10E9/L (ref 0–0.4)
IMM GRANULOCYTES NFR BLD: 0.3 %
INR PPP: 2.74 (ref 0.86–1.14)
INR PPP: 3.26 (ref 0.86–1.14)
INR PPP: 3.27 (ref 0.86–1.14)
LABORATORY COMMENT REPORT: NORMAL
LYMPHOCYTES # BLD AUTO: 1.4 10E9/L (ref 0.8–5.3)
LYMPHOCYTES NFR BLD AUTO: 23.3 %
MCH RBC QN AUTO: 32.3 PG (ref 26.5–33)
MCHC RBC AUTO-ENTMCNC: 33 G/DL (ref 31.5–36.5)
MCV RBC AUTO: 98 FL (ref 78–100)
MONOCYTES # BLD AUTO: 0.5 10E9/L (ref 0–1.3)
MONOCYTES NFR BLD AUTO: 9 %
NEUTROPHILS # BLD AUTO: 3.9 10E9/L (ref 1.6–8.3)
NEUTROPHILS NFR BLD AUTO: 64.7 %
NRBC # BLD AUTO: 0 10*3/UL
NRBC BLD AUTO-RTO: 0 /100
PLATELET # BLD AUTO: 246 10E9/L (ref 150–450)
POTASSIUM SERPL-SCNC: 4.3 MMOL/L (ref 3.4–5.3)
PROT SERPL-MCNC: 6.9 G/DL (ref 6.8–8.8)
RBC # BLD AUTO: 4.83 10E12/L (ref 4.4–5.9)
SARS-COV-2 RNA RESP QL NAA+PROBE: NEGATIVE
SODIUM SERPL-SCNC: 140 MMOL/L (ref 133–144)
SPECIMEN SOURCE: NORMAL
WBC # BLD AUTO: 6 10E9/L (ref 4–11)

## 2021-01-09 PROCEDURE — 85025 COMPLETE CBC W/AUTO DIFF WBC: CPT | Performed by: EMERGENCY MEDICINE

## 2021-01-09 PROCEDURE — 250N000013 HC RX MED GY IP 250 OP 250 PS 637: Performed by: INTERNAL MEDICINE

## 2021-01-09 PROCEDURE — 87040 BLOOD CULTURE FOR BACTERIA: CPT | Performed by: EMERGENCY MEDICINE

## 2021-01-09 PROCEDURE — 99285 EMERGENCY DEPT VISIT HI MDM: CPT | Mod: 25

## 2021-01-09 PROCEDURE — 36415 COLL VENOUS BLD VENIPUNCTURE: CPT | Performed by: EMERGENCY MEDICINE

## 2021-01-09 PROCEDURE — 250N000011 HC RX IP 250 OP 636: Performed by: EMERGENCY MEDICINE

## 2021-01-09 PROCEDURE — 36415 COLL VENOUS BLD VENIPUNCTURE: CPT | Performed by: INTERNAL MEDICINE

## 2021-01-09 PROCEDURE — 120N000001 HC R&B MED SURG/OB

## 2021-01-09 PROCEDURE — 258N000003 HC RX IP 258 OP 636: Performed by: EMERGENCY MEDICINE

## 2021-01-09 PROCEDURE — 96374 THER/PROPH/DIAG INJ IV PUSH: CPT | Mod: 59

## 2021-01-09 PROCEDURE — 85610 PROTHROMBIN TIME: CPT | Performed by: EMERGENCY MEDICINE

## 2021-01-09 PROCEDURE — 85730 THROMBOPLASTIN TIME PARTIAL: CPT | Performed by: EMERGENCY MEDICINE

## 2021-01-09 PROCEDURE — 85610 PROTHROMBIN TIME: CPT | Performed by: INTERNAL MEDICINE

## 2021-01-09 PROCEDURE — C9803 HOPD COVID-19 SPEC COLLECT: HCPCS

## 2021-01-09 PROCEDURE — 80053 COMPREHEN METABOLIC PANEL: CPT | Performed by: EMERGENCY MEDICINE

## 2021-01-09 PROCEDURE — 99223 1ST HOSP IP/OBS HIGH 75: CPT | Mod: AI | Performed by: INTERNAL MEDICINE

## 2021-01-09 PROCEDURE — 73706 CT ANGIO LWR EXTR W/O&W/DYE: CPT | Mod: LT

## 2021-01-09 PROCEDURE — 82565 ASSAY OF CREATININE: CPT

## 2021-01-09 PROCEDURE — 87635 SARS-COV-2 COVID-19 AMP PRB: CPT | Performed by: EMERGENCY MEDICINE

## 2021-01-09 RX ORDER — ACETAMINOPHEN 325 MG/1
650 TABLET ORAL EVERY 4 HOURS PRN
Status: DISCONTINUED | OUTPATIENT
Start: 2021-01-09 | End: 2021-01-10

## 2021-01-09 RX ORDER — NALOXONE HYDROCHLORIDE 0.4 MG/ML
0.4 INJECTION, SOLUTION INTRAMUSCULAR; INTRAVENOUS; SUBCUTANEOUS
Status: DISCONTINUED | OUTPATIENT
Start: 2021-01-09 | End: 2021-01-13 | Stop reason: HOSPADM

## 2021-01-09 RX ORDER — HYDROCODONE BITARTRATE AND ACETAMINOPHEN 5; 325 MG/1; MG/1
1-2 TABLET ORAL EVERY 4 HOURS PRN
Status: DISCONTINUED | OUTPATIENT
Start: 2021-01-09 | End: 2021-01-10

## 2021-01-09 RX ORDER — LIOTHYRONINE SODIUM 25 UG/1
25 TABLET ORAL DAILY
Status: DISCONTINUED | OUTPATIENT
Start: 2021-01-10 | End: 2021-01-13 | Stop reason: HOSPADM

## 2021-01-09 RX ORDER — LEVOTHYROXINE SODIUM 150 UG/1
150 TABLET ORAL DAILY
Status: DISCONTINUED | OUTPATIENT
Start: 2021-01-10 | End: 2021-01-13 | Stop reason: HOSPADM

## 2021-01-09 RX ORDER — PROPAFENONE HYDROCHLORIDE 150 MG/1
150 TABLET, COATED ORAL EVERY 8 HOURS
Status: DISCONTINUED | OUTPATIENT
Start: 2021-01-09 | End: 2021-01-13 | Stop reason: HOSPADM

## 2021-01-09 RX ORDER — SIMVASTATIN 40 MG
40 TABLET ORAL AT BEDTIME
Status: DISCONTINUED | OUTPATIENT
Start: 2021-01-09 | End: 2021-01-13 | Stop reason: HOSPADM

## 2021-01-09 RX ORDER — NALOXONE HYDROCHLORIDE 0.4 MG/ML
0.2 INJECTION, SOLUTION INTRAMUSCULAR; INTRAVENOUS; SUBCUTANEOUS
Status: DISCONTINUED | OUTPATIENT
Start: 2021-01-09 | End: 2021-01-13 | Stop reason: HOSPADM

## 2021-01-09 RX ORDER — IOPAMIDOL 755 MG/ML
120 INJECTION, SOLUTION INTRAVASCULAR ONCE
Status: COMPLETED | OUTPATIENT
Start: 2021-01-09 | End: 2021-01-09

## 2021-01-09 RX ORDER — VENLAFAXINE HYDROCHLORIDE 150 MG/1
300 CAPSULE, EXTENDED RELEASE ORAL DAILY
Status: DISCONTINUED | OUTPATIENT
Start: 2021-01-10 | End: 2021-01-13 | Stop reason: HOSPADM

## 2021-01-09 RX ORDER — MEMANTINE HYDROCHLORIDE 10 MG/1
10 TABLET ORAL 2 TIMES DAILY
COMMUNITY
End: 2021-05-06

## 2021-01-09 RX ORDER — WARFARIN SODIUM 5 MG/1
7.5 TABLET ORAL WEEKLY
COMMUNITY
End: 2021-01-14

## 2021-01-09 RX ORDER — WARFARIN SODIUM 5 MG/1
5 TABLET ORAL DAILY
COMMUNITY
End: 2021-01-14

## 2021-01-09 RX ORDER — LIDOCAINE 40 MG/G
CREAM TOPICAL
Status: DISCONTINUED | OUTPATIENT
Start: 2021-01-09 | End: 2021-01-11

## 2021-01-09 RX ADMIN — IOPAMIDOL 120 ML: 755 INJECTION, SOLUTION INTRAVENOUS at 14:14

## 2021-01-09 RX ADMIN — PHYTONADIONE 1 MG: 2 INJECTION, EMULSION INTRAMUSCULAR; INTRAVENOUS; SUBCUTANEOUS at 18:14

## 2021-01-09 RX ADMIN — PROPAFENONE HYDROCHLORIDE 150 MG: 150 TABLET, COATED ORAL at 22:07

## 2021-01-09 RX ADMIN — SIMVASTATIN 40 MG: 40 TABLET, FILM COATED ORAL at 22:07

## 2021-01-09 RX ADMIN — HYDROCODONE BITARTRATE AND ACETAMINOPHEN 1 TABLET: 5; 325 TABLET ORAL at 22:07

## 2021-01-09 ASSESSMENT — ENCOUNTER SYMPTOMS
FEVER: 0
CHILLS: 0
WOUND: 1

## 2021-01-09 ASSESSMENT — ACTIVITIES OF DAILY LIVING (ADL): ADLS_ACUITY_SCORE: 16

## 2021-01-09 ASSESSMENT — MIFFLIN-ST. JEOR: SCORE: 2233.7

## 2021-01-09 NOTE — ED PROVIDER NOTES
History   Chief Complaint:  Left leg wound    HPI   Todd S Aschoff is a 65 year old male with a history of atrial fibrillation, anticoagulated on Coumadin, hyperlipidemia, and osteoarthritis who presents to the ED for an evaluation of a left leg would. On 12/5/2020, the patient was seen in the ED for a mechanical fall in which his left leg and shoulder was injured. He was discharged home following a negative work-up. On 12/8/2020, he was seen again the ED for increased swelling of the leg and was sent home after it was determined there was no infection. Today, he reports the scab falling of his leg last night causing him to bleed. Other than some minor pain, he denies any fever, chills, or other symptoms at this time.     Allergies:  Gabapentin      Medications:    Zyrtec  Flexeril  Valium  Aricept  Tenex  Levothyroxine  Cytomel  Namenda  Myrbetriq  Lyrica  Rythmol  Senokot-S/Pericolace  Zocor  Effexor-XR  Desyrel  Coumadin  Albuterol  Abilify  Hytrin    Past Medical History:    Alcohol dependence  Atrial fibrillation  BPH  Coagulation disorder  Depression  Hyperlipidemia  Osteoarthritis  Prostate infection  Sciatica  Hypothyroidism   Aorta aneurysm and dissection  Spinal stenosis of lumbar region  Rotator cuff tear  Radiculitis    Past Surgical History:    Aortic valve replacement  Left total hip arthroplasty  Right total hip arthroplasty  Right cataract removal  Cholecystectomy  Colonoscopy  Lumbar decompression  Exploration of lumbar wound, remove hardware  Anterior cervical fusion  Irrigation and debridement of spine, close wound combined  Cyst removal  Tonsillectomy  [place] teeth removal  Repair of TAA with graft  Elbow surgery    Family History:    CVD  Prostate cancer  Dementia  Lymphoma  Hypertension  Sleep apnea  Diabetes    Social History:  The patient presented alone.  The patient stopped drinking in 2009.     Review of Systems   Constitutional: Negative for chills and fever.   Cardiovascular: Positive  for leg swelling (Left).   Skin: Positive for wound (Left leg).   All other systems reviewed and are negative.    Physical Exam     Patient Vitals for the past 24 hrs:   BP Temp Pulse Resp SpO2   01/09/21 1224 106/75 97.5  F (36.4  C) 86 16 99 %     Physical Exam  Constitutional: Alert, attentive, GCS 15  HENT:    Nose: Nose normal.    Mouth/Throat: Oropharynx is clear, mucous membranes are moist  Eyes: EOM are normal, anicteric, conjugate gaze  CV: Regular rate and rhythm; mechanical heart sounds. Left foot warm but purple.  Chest: Effort normal and breath sounds clear without wheezing or rales, symmetric bilaterally   GI:  non tender. No distension. No guarding or rebound.    MSK: Left lower extremity swelling.Slightly warm to touch.  Neurological: Alert, attentive, moving all extremities equally.   Skin: 3 large ovoid scabbed over sores left medial calf with inferior larger 1 with dried clot sitting superficially that is dark black.  No surrounding erythema or tenderness.    Emergency Department Course   Imaging:  Radiology findings were communicated with the patient who voiced understanding of the findings.    CTA Lower Extremity Left with Contrast:  1. Patent arteries throughout the left lower extremity. Three-vessel   runoff.   2. Hyperdense fluid collection in the medial left calf measuring 29 x   6.7 x 4.2 cm, given patient's history of recent fall and being   supratherapeutic on Coumadin with bleeding wounds this likely   represents a hematoma. Given that patient has a normal white blood   cell count abscess would be less likely.   3. Multiple skin defects along the medial aspect of the left distal   calf/ankle, consistent with patient's history of medial calf wounds     Reading per radiology.    Laboratory:  Laboratory findings were communicated with the patient who voiced understanding of the findings.    CBC: WNL. (WBC 6.0, HGB 15.6, )     CMP: CO2 34 (H), anion gap 1 (L),  (H), o/w WNL.  (Creatinine: 1.02)    Creatinine POCT (Collected 1311): all negative    INR: 3.26 (H)    INR: pending    INR: pending    PTT: 46 (H)     Blood Culture x2: Pending    Emergency Department Course:  Reviewed:  1238: I personally reviewed all nursing notes.    Assessments:  1243: I initially assessed the patient in ED room 32.   1712: I reassessed the patient and discussed the results of her lab and imaging results.  1726: I reassessed the patient and informed him of the details of my call with Dr. Kemp.    Consults:  1700: I spoke with Dr. Deleon of the radiology service regarding the patient's presentation, findings, and plan of care.  1720: I spoke with Dr. Kemp of [facility] regarding the patient's presentation, findings, and plan of care.  1748: I spoke with Dr. Calderón of the hospitalist service regarding the patient's presentation, findings, and plan of care.    Disposition:  Admitted to [facility] ED.    Impression & Plan   Medical Decision Makin-year-old male past medical history significant for Coumadin usage for mechanical heart valve, history of A. fib presenting for evaluation of persistent left lower leg swelling and intermittent bleeding from several sources that began 1 month prior following a mechanical fall.  He had x-rays at the time of his fall 1 month ago that were negative but had significant leg swelling and developed presumably pressure sores that now intermittently bleed.  His foot was purple on arrival though I think this is secondary to overly tight Ace wrap that have been in place to stop the bleeding.  On my exam there was no active bleeding I did place clean sterile gauze overlying Surgicel on the area of wound that had opened.  However given the slight warmth to his calf, fluctuance and swelling I did elect to proceed with CT imaging to assess for possible deep space infection and with the purple foot did include CT angiogram which showed widely patent vessels but did show a large left  calf hematoma with lower suspicion for abscess.  He does not have a fever, does not have a leukocytosis and reports swelling until his wounds open and bleeds over the last few weeks.  In discussion with orthopedics, they agreed patient would benefit from debridement with possible drain placement and potentially wound VAC to be completed in the morning.  He was given a dose of oral vitamin K to help notch down his INR though he cannot be completely reversed due to his mechanical heart valve.  Will admit to medicine for medical clearance, plan for orthopedic debridement in the morning.    Diagnosis:    ICD-10-CM    1. Hematoma of left lower extremity, initial encounter  S80.12XA    2. Bleeding from wound  T14.8XXA    3. Long term current use of anticoagulant therapy  Z79.01        Disposition:  Admitted under the care of Dr. Calderón.      Aurelio Mayo MD  [employer]  6:49 PM 01/09/21     Scribe Disclosure:  I, Donya Lindo, am serving as a scribe at 2:19 PM on 1/9/2021 to document services personally performed by Aurelio Mayo MD based on my observations and the provider's statements to me.        Aurelio Mayo MD  01/09/21 9431

## 2021-01-09 NOTE — ED TRIAGE NOTES
Pt presents with c/o L calf pain since sustaining a L leg injury on Dec 7. Pt states that his pain is not improving. Last night, the scab fell off his leg and he started bleeding. Pt on blood thinners for an artificial heart valve. ABC intact. A/O X4. Bleeding controlled at this time.

## 2021-01-10 ENCOUNTER — ANESTHESIA EVENT (OUTPATIENT)
Dept: SURGERY | Facility: CLINIC | Age: 66
End: 2021-01-10
Payer: COMMERCIAL

## 2021-01-10 ENCOUNTER — ANESTHESIA (OUTPATIENT)
Dept: SURGERY | Facility: CLINIC | Age: 66
End: 2021-01-10
Payer: COMMERCIAL

## 2021-01-10 LAB
ANION GAP SERPL CALCULATED.3IONS-SCNC: 1 MMOL/L (ref 3–14)
APTT PPP: 36 SEC (ref 22–37)
BUN SERPL-MCNC: 15 MG/DL (ref 7–30)
CALCIUM SERPL-MCNC: 8.8 MG/DL (ref 8.5–10.1)
CHLORIDE SERPL-SCNC: 107 MMOL/L (ref 94–109)
CO2 SERPL-SCNC: 30 MMOL/L (ref 20–32)
CREAT SERPL-MCNC: 0.93 MG/DL (ref 0.66–1.25)
ERYTHROCYTE [DISTWIDTH] IN BLOOD BY AUTOMATED COUNT: 14 % (ref 10–15)
GFR SERPL CREATININE-BSD FRML MDRD: 87 ML/MIN/{1.73_M2}
GLUCOSE SERPL-MCNC: 85 MG/DL (ref 70–99)
GRAM STN SPEC: NORMAL
GRAM STN SPEC: NORMAL
HCT VFR BLD AUTO: 45.7 % (ref 40–53)
HGB BLD-MCNC: 14.7 G/DL (ref 13.3–17.7)
INR PPP: 1.7 (ref 0.86–1.14)
INR PPP: 1.88 (ref 0.86–1.14)
INR PPP: 2.22 (ref 0.86–1.14)
Lab: NORMAL
MCH RBC QN AUTO: 31.9 PG (ref 26.5–33)
MCHC RBC AUTO-ENTMCNC: 32.2 G/DL (ref 31.5–36.5)
MCV RBC AUTO: 99 FL (ref 78–100)
PLATELET # BLD AUTO: 214 10E9/L (ref 150–450)
POTASSIUM SERPL-SCNC: 3.8 MMOL/L (ref 3.4–5.3)
RBC # BLD AUTO: 4.61 10E12/L (ref 4.4–5.9)
SODIUM SERPL-SCNC: 138 MMOL/L (ref 133–144)
SPECIMEN SOURCE: NORMAL
UFH PPP CHRO-ACNC: 0.33 IU/ML
UFH PPP CHRO-ACNC: <0.1 IU/ML
WBC # BLD AUTO: 6 10E9/L (ref 4–11)

## 2021-01-10 PROCEDURE — 85730 THROMBOPLASTIN TIME PARTIAL: CPT | Performed by: INTERNAL MEDICINE

## 2021-01-10 PROCEDURE — 250N000011 HC RX IP 250 OP 636: Performed by: PHYSICIAN ASSISTANT

## 2021-01-10 PROCEDURE — 250N000011 HC RX IP 250 OP 636: Performed by: NURSE ANESTHETIST, CERTIFIED REGISTERED

## 2021-01-10 PROCEDURE — 85027 COMPLETE CBC AUTOMATED: CPT | Performed by: INTERNAL MEDICINE

## 2021-01-10 PROCEDURE — 250N000009 HC RX 250: Performed by: NURSE ANESTHETIST, CERTIFIED REGISTERED

## 2021-01-10 PROCEDURE — 85610 PROTHROMBIN TIME: CPT | Performed by: PHYSICIAN ASSISTANT

## 2021-01-10 PROCEDURE — 87205 SMEAR GRAM STAIN: CPT | Performed by: ORTHOPAEDIC SURGERY

## 2021-01-10 PROCEDURE — 99233 SBSQ HOSP IP/OBS HIGH 50: CPT | Performed by: INTERNAL MEDICINE

## 2021-01-10 PROCEDURE — 87077 CULTURE AEROBIC IDENTIFY: CPT | Performed by: ORTHOPAEDIC SURGERY

## 2021-01-10 PROCEDURE — 258N000001 HC RX 258: Performed by: ORTHOPAEDIC SURGERY

## 2021-01-10 PROCEDURE — 36415 COLL VENOUS BLD VENIPUNCTURE: CPT | Performed by: INTERNAL MEDICINE

## 2021-01-10 PROCEDURE — 250N000011 HC RX IP 250 OP 636: Performed by: INTERNAL MEDICINE

## 2021-01-10 PROCEDURE — 36415 COLL VENOUS BLD VENIPUNCTURE: CPT | Performed by: PHYSICIAN ASSISTANT

## 2021-01-10 PROCEDURE — 120N000001 HC R&B MED SURG/OB

## 2021-01-10 PROCEDURE — 370N000017 HC ANESTHESIA TECHNICAL FEE, PER MIN: Performed by: ORTHOPAEDIC SURGERY

## 2021-01-10 PROCEDURE — 250N000009 HC RX 250: Performed by: ORTHOPAEDIC SURGERY

## 2021-01-10 PROCEDURE — 258N000003 HC RX IP 258 OP 636: Performed by: PHYSICIAN ASSISTANT

## 2021-01-10 PROCEDURE — 87070 CULTURE OTHR SPECIMN AEROBIC: CPT | Performed by: ORTHOPAEDIC SURGERY

## 2021-01-10 PROCEDURE — 85520 HEPARIN ASSAY: CPT | Performed by: INTERNAL MEDICINE

## 2021-01-10 PROCEDURE — 258N000003 HC RX IP 258 OP 636: Performed by: NURSE ANESTHETIST, CERTIFIED REGISTERED

## 2021-01-10 PROCEDURE — 85610 PROTHROMBIN TIME: CPT | Performed by: INTERNAL MEDICINE

## 2021-01-10 PROCEDURE — 87176 TISSUE HOMOGENIZATION CULTR: CPT | Performed by: ORTHOPAEDIC SURGERY

## 2021-01-10 PROCEDURE — 999N000141 HC STATISTIC PRE-PROCEDURE NURSING ASSESSMENT: Performed by: ORTHOPAEDIC SURGERY

## 2021-01-10 PROCEDURE — 250N000013 HC RX MED GY IP 250 OP 250 PS 637: Performed by: INTERNAL MEDICINE

## 2021-01-10 PROCEDURE — 87186 SC STD MICRODIL/AGAR DIL: CPT | Performed by: ORTHOPAEDIC SURGERY

## 2021-01-10 PROCEDURE — 360N000076 HC SURGERY LEVEL 3, PER MIN: Performed by: ORTHOPAEDIC SURGERY

## 2021-01-10 PROCEDURE — 250N000011 HC RX IP 250 OP 636: Performed by: ANESTHESIOLOGY

## 2021-01-10 PROCEDURE — 272N000001 HC OR GENERAL SUPPLY STERILE: Performed by: ORTHOPAEDIC SURGERY

## 2021-01-10 PROCEDURE — 0HQLXZZ REPAIR LEFT LOWER LEG SKIN, EXTERNAL APPROACH: ICD-10-PCS | Performed by: ORTHOPAEDIC SURGERY

## 2021-01-10 PROCEDURE — 0JBP0ZZ EXCISION OF LEFT LOWER LEG SUBCUTANEOUS TISSUE AND FASCIA, OPEN APPROACH: ICD-10-PCS | Performed by: ORTHOPAEDIC SURGERY

## 2021-01-10 PROCEDURE — 80048 BASIC METABOLIC PNL TOTAL CA: CPT | Performed by: INTERNAL MEDICINE

## 2021-01-10 PROCEDURE — 87075 CULTR BACTERIA EXCEPT BLOOD: CPT | Performed by: ORTHOPAEDIC SURGERY

## 2021-01-10 PROCEDURE — 250N000013 HC RX MED GY IP 250 OP 250 PS 637: Performed by: PHYSICIAN ASSISTANT

## 2021-01-10 PROCEDURE — 710N000009 HC RECOVERY PHASE 1, LEVEL 1, PER MIN: Performed by: ORTHOPAEDIC SURGERY

## 2021-01-10 RX ORDER — MIRABEGRON 25 MG/1
50 TABLET, FILM COATED, EXTENDED RELEASE ORAL AT BEDTIME
Status: DISCONTINUED | OUTPATIENT
Start: 2021-01-11 | End: 2021-01-13 | Stop reason: HOSPADM

## 2021-01-10 RX ORDER — ACETAMINOPHEN 325 MG/1
975 TABLET ORAL 3 TIMES DAILY
Status: DISCONTINUED | OUTPATIENT
Start: 2021-01-10 | End: 2021-01-13 | Stop reason: HOSPADM

## 2021-01-10 RX ORDER — BISACODYL 10 MG
10 SUPPOSITORY, RECTAL RECTAL DAILY PRN
Status: DISCONTINUED | OUTPATIENT
Start: 2021-01-10 | End: 2021-01-13 | Stop reason: HOSPADM

## 2021-01-10 RX ORDER — DONEPEZIL HYDROCHLORIDE 10 MG/1
10 TABLET, FILM COATED ORAL AT BEDTIME
Status: DISCONTINUED | OUTPATIENT
Start: 2021-01-10 | End: 2021-01-13 | Stop reason: HOSPADM

## 2021-01-10 RX ORDER — ONDANSETRON 2 MG/ML
4 INJECTION INTRAMUSCULAR; INTRAVENOUS EVERY 30 MIN PRN
Status: DISCONTINUED | OUTPATIENT
Start: 2021-01-10 | End: 2021-01-10 | Stop reason: HOSPADM

## 2021-01-10 RX ORDER — HYDROMORPHONE HYDROCHLORIDE 1 MG/ML
0.2 INJECTION, SOLUTION INTRAMUSCULAR; INTRAVENOUS; SUBCUTANEOUS
Status: DISCONTINUED | OUTPATIENT
Start: 2021-01-10 | End: 2021-01-13 | Stop reason: HOSPADM

## 2021-01-10 RX ORDER — HYDROMORPHONE HYDROCHLORIDE 1 MG/ML
.3-.5 INJECTION, SOLUTION INTRAMUSCULAR; INTRAVENOUS; SUBCUTANEOUS EVERY 5 MIN PRN
Status: DISCONTINUED | OUTPATIENT
Start: 2021-01-10 | End: 2021-01-10 | Stop reason: HOSPADM

## 2021-01-10 RX ORDER — SODIUM CHLORIDE, SODIUM LACTATE, POTASSIUM CHLORIDE, CALCIUM CHLORIDE 600; 310; 30; 20 MG/100ML; MG/100ML; MG/100ML; MG/100ML
INJECTION, SOLUTION INTRAVENOUS CONTINUOUS
Status: DISCONTINUED | OUTPATIENT
Start: 2021-01-10 | End: 2021-01-13 | Stop reason: HOSPADM

## 2021-01-10 RX ORDER — OXYCODONE HYDROCHLORIDE 5 MG/1
5 TABLET ORAL
Status: DISCONTINUED | OUTPATIENT
Start: 2021-01-10 | End: 2021-01-13 | Stop reason: HOSPADM

## 2021-01-10 RX ORDER — DOCUSATE SODIUM 100 MG/1
100 CAPSULE, LIQUID FILLED ORAL 2 TIMES DAILY
Status: DISCONTINUED | OUTPATIENT
Start: 2021-01-10 | End: 2021-01-13 | Stop reason: HOSPADM

## 2021-01-10 RX ORDER — POLYETHYLENE GLYCOL 3350 17 G/17G
17 POWDER, FOR SOLUTION ORAL DAILY
Status: DISCONTINUED | OUTPATIENT
Start: 2021-01-11 | End: 2021-01-13 | Stop reason: HOSPADM

## 2021-01-10 RX ORDER — ACETAMINOPHEN 325 MG/1
650 TABLET ORAL EVERY 4 HOURS PRN
Status: DISCONTINUED | OUTPATIENT
Start: 2021-01-13 | End: 2021-01-13 | Stop reason: HOSPADM

## 2021-01-10 RX ORDER — ONDANSETRON 2 MG/ML
4 INJECTION INTRAMUSCULAR; INTRAVENOUS EVERY 6 HOURS PRN
Status: DISCONTINUED | OUTPATIENT
Start: 2021-01-10 | End: 2021-01-13 | Stop reason: HOSPADM

## 2021-01-10 RX ORDER — HYDROXYZINE HYDROCHLORIDE 25 MG/1
25 TABLET, FILM COATED ORAL EVERY 6 HOURS PRN
Status: DISCONTINUED | OUTPATIENT
Start: 2021-01-10 | End: 2021-01-13 | Stop reason: HOSPADM

## 2021-01-10 RX ORDER — HYDROMORPHONE HYDROCHLORIDE 1 MG/ML
0.4 INJECTION, SOLUTION INTRAMUSCULAR; INTRAVENOUS; SUBCUTANEOUS
Status: DISCONTINUED | OUTPATIENT
Start: 2021-01-10 | End: 2021-01-13 | Stop reason: HOSPADM

## 2021-01-10 RX ORDER — ONDANSETRON 2 MG/ML
INJECTION INTRAMUSCULAR; INTRAVENOUS PRN
Status: DISCONTINUED | OUTPATIENT
Start: 2021-01-10 | End: 2021-01-10

## 2021-01-10 RX ORDER — LIDOCAINE 40 MG/G
CREAM TOPICAL
Status: DISCONTINUED | OUTPATIENT
Start: 2021-01-10 | End: 2021-01-13 | Stop reason: HOSPADM

## 2021-01-10 RX ORDER — NALOXONE HYDROCHLORIDE 0.4 MG/ML
0.2 INJECTION, SOLUTION INTRAMUSCULAR; INTRAVENOUS; SUBCUTANEOUS
Status: DISCONTINUED | OUTPATIENT
Start: 2021-01-10 | End: 2021-01-10

## 2021-01-10 RX ORDER — PREGABALIN 100 MG/1
100 CAPSULE ORAL 3 TIMES DAILY
Status: DISCONTINUED | OUTPATIENT
Start: 2021-01-10 | End: 2021-01-13 | Stop reason: HOSPADM

## 2021-01-10 RX ORDER — AMOXICILLIN 250 MG
1 CAPSULE ORAL 2 TIMES DAILY
Status: DISCONTINUED | OUTPATIENT
Start: 2021-01-10 | End: 2021-01-13 | Stop reason: HOSPADM

## 2021-01-10 RX ORDER — NALOXONE HYDROCHLORIDE 0.4 MG/ML
0.4 INJECTION, SOLUTION INTRAMUSCULAR; INTRAVENOUS; SUBCUTANEOUS
Status: DISCONTINUED | OUTPATIENT
Start: 2021-01-10 | End: 2021-01-10

## 2021-01-10 RX ORDER — ONDANSETRON 4 MG/1
4 TABLET, ORALLY DISINTEGRATING ORAL EVERY 30 MIN PRN
Status: DISCONTINUED | OUTPATIENT
Start: 2021-01-10 | End: 2021-01-10 | Stop reason: HOSPADM

## 2021-01-10 RX ORDER — FENTANYL CITRATE 50 UG/ML
INJECTION, SOLUTION INTRAMUSCULAR; INTRAVENOUS PRN
Status: DISCONTINUED | OUTPATIENT
Start: 2021-01-10 | End: 2021-01-10

## 2021-01-10 RX ORDER — OXYCODONE HYDROCHLORIDE 5 MG/1
10 TABLET ORAL EVERY 4 HOURS PRN
Status: DISCONTINUED | OUTPATIENT
Start: 2021-01-10 | End: 2021-01-13 | Stop reason: HOSPADM

## 2021-01-10 RX ORDER — FOLIC ACID 1 MG/1
5 TABLET ORAL DAILY
Status: DISCONTINUED | OUTPATIENT
Start: 2021-01-11 | End: 2021-01-13 | Stop reason: HOSPADM

## 2021-01-10 RX ORDER — MAGNESIUM HYDROXIDE 1200 MG/15ML
LIQUID ORAL PRN
Status: DISCONTINUED | OUTPATIENT
Start: 2021-01-10 | End: 2021-01-10 | Stop reason: HOSPADM

## 2021-01-10 RX ORDER — METOPROLOL TARTRATE 1 MG/ML
1-2 INJECTION, SOLUTION INTRAVENOUS EVERY 5 MIN PRN
Status: DISCONTINUED | OUTPATIENT
Start: 2021-01-10 | End: 2021-01-10 | Stop reason: HOSPADM

## 2021-01-10 RX ORDER — SODIUM CHLORIDE, SODIUM LACTATE, POTASSIUM CHLORIDE, CALCIUM CHLORIDE 600; 310; 30; 20 MG/100ML; MG/100ML; MG/100ML; MG/100ML
INJECTION, SOLUTION INTRAVENOUS CONTINUOUS PRN
Status: DISCONTINUED | OUTPATIENT
Start: 2021-01-10 | End: 2021-01-10

## 2021-01-10 RX ORDER — FENTANYL CITRATE 50 UG/ML
25-50 INJECTION, SOLUTION INTRAMUSCULAR; INTRAVENOUS
Status: DISCONTINUED | OUTPATIENT
Start: 2021-01-10 | End: 2021-01-10 | Stop reason: HOSPADM

## 2021-01-10 RX ORDER — ONDANSETRON 4 MG/1
4 TABLET, ORALLY DISINTEGRATING ORAL EVERY 6 HOURS PRN
Status: DISCONTINUED | OUTPATIENT
Start: 2021-01-10 | End: 2021-01-13 | Stop reason: HOSPADM

## 2021-01-10 RX ORDER — HEPARIN SODIUM 10000 [USP'U]/100ML
0-5000 INJECTION, SOLUTION INTRAVENOUS CONTINUOUS
Status: DISPENSED | OUTPATIENT
Start: 2021-01-10 | End: 2021-01-11

## 2021-01-10 RX ORDER — PROCHLORPERAZINE MALEATE 5 MG
10 TABLET ORAL EVERY 6 HOURS PRN
Status: DISCONTINUED | OUTPATIENT
Start: 2021-01-10 | End: 2021-01-13 | Stop reason: HOSPADM

## 2021-01-10 RX ORDER — DONEPEZIL HYDROCHLORIDE 10 MG/1
10 TABLET, FILM COATED ORAL AT BEDTIME
Status: DISCONTINUED | OUTPATIENT
Start: 2021-01-10 | End: 2021-01-10

## 2021-01-10 RX ORDER — LIDOCAINE HYDROCHLORIDE 10 MG/ML
INJECTION, SOLUTION INFILTRATION; PERINEURAL PRN
Status: DISCONTINUED | OUTPATIENT
Start: 2021-01-10 | End: 2021-01-10

## 2021-01-10 RX ORDER — SODIUM CHLORIDE, SODIUM LACTATE, POTASSIUM CHLORIDE, CALCIUM CHLORIDE 600; 310; 30; 20 MG/100ML; MG/100ML; MG/100ML; MG/100ML
INJECTION, SOLUTION INTRAVENOUS CONTINUOUS
Status: DISCONTINUED | OUTPATIENT
Start: 2021-01-10 | End: 2021-01-10 | Stop reason: HOSPADM

## 2021-01-10 RX ORDER — CEFAZOLIN SODIUM 2 G/100ML
2 INJECTION, SOLUTION INTRAVENOUS EVERY 8 HOURS
Status: COMPLETED | OUTPATIENT
Start: 2021-01-10 | End: 2021-01-11

## 2021-01-10 RX ORDER — CEFAZOLIN SODIUM 1 G/50ML
1 INJECTION, SOLUTION INTRAVENOUS SEE ADMIN INSTRUCTIONS
Status: DISCONTINUED | OUTPATIENT
Start: 2021-01-10 | End: 2021-01-10 | Stop reason: HOSPADM

## 2021-01-10 RX ORDER — CEFAZOLIN SODIUM IN 0.9 % NACL 3 G/100 ML
3 INTRAVENOUS SOLUTION, PIGGYBACK (ML) INTRAVENOUS
Status: COMPLETED | OUTPATIENT
Start: 2021-01-10 | End: 2021-01-10

## 2021-01-10 RX ORDER — WARFARIN SODIUM 5 MG/1
5 TABLET ORAL
Status: COMPLETED | OUTPATIENT
Start: 2021-01-10 | End: 2021-01-10

## 2021-01-10 RX ORDER — PROPOFOL 10 MG/ML
INJECTION, EMULSION INTRAVENOUS PRN
Status: DISCONTINUED | OUTPATIENT
Start: 2021-01-10 | End: 2021-01-10

## 2021-01-10 RX ORDER — MEMANTINE HYDROCHLORIDE 5 MG/1
10 TABLET ORAL 2 TIMES DAILY
Status: DISCONTINUED | OUTPATIENT
Start: 2021-01-10 | End: 2021-01-13 | Stop reason: HOSPADM

## 2021-01-10 RX ADMIN — DONEPEZIL HYDROCHLORIDE 10 MG: 10 TABLET ORAL at 23:37

## 2021-01-10 RX ADMIN — WARFARIN SODIUM 5 MG: 5 TABLET ORAL at 17:55

## 2021-01-10 RX ADMIN — SODIUM CHLORIDE, POTASSIUM CHLORIDE, SODIUM LACTATE AND CALCIUM CHLORIDE: 600; 310; 30; 20 INJECTION, SOLUTION INTRAVENOUS at 15:42

## 2021-01-10 RX ADMIN — ACETAMINOPHEN 975 MG: 325 TABLET, FILM COATED ORAL at 19:49

## 2021-01-10 RX ADMIN — DOCUSATE SODIUM 50 MG AND SENNOSIDES 8.6 MG 1 TABLET: 8.6; 5 TABLET, FILM COATED ORAL at 19:49

## 2021-01-10 RX ADMIN — PREGABALIN 100 MG: 100 CAPSULE ORAL at 19:51

## 2021-01-10 RX ADMIN — FENTANYL CITRATE 50 MCG: 50 INJECTION, SOLUTION INTRAMUSCULAR; INTRAVENOUS at 08:31

## 2021-01-10 RX ADMIN — DOCUSATE SODIUM 100 MG: 100 CAPSULE, LIQUID FILLED ORAL at 19:51

## 2021-01-10 RX ADMIN — PROPAFENONE HYDROCHLORIDE 150 MG: 150 TABLET, COATED ORAL at 22:03

## 2021-01-10 RX ADMIN — OXYCODONE HYDROCHLORIDE 5 MG: 5 TABLET ORAL at 14:37

## 2021-01-10 RX ADMIN — MIDAZOLAM 2 MG: 1 INJECTION INTRAMUSCULAR; INTRAVENOUS at 08:03

## 2021-01-10 RX ADMIN — FENTANYL CITRATE 100 MCG: 50 INJECTION, SOLUTION INTRAMUSCULAR; INTRAVENOUS at 08:07

## 2021-01-10 RX ADMIN — HYDROMORPHONE HYDROCHLORIDE 0.5 MG: 1 INJECTION, SOLUTION INTRAMUSCULAR; INTRAVENOUS; SUBCUTANEOUS at 10:10

## 2021-01-10 RX ADMIN — PROPAFENONE HYDROCHLORIDE 150 MG: 150 TABLET, COATED ORAL at 05:55

## 2021-01-10 RX ADMIN — FENTANYL CITRATE 50 MCG: 50 INJECTION, SOLUTION INTRAMUSCULAR; INTRAVENOUS at 09:10

## 2021-01-10 RX ADMIN — PROPOFOL 40 MG: 10 INJECTION, EMULSION INTRAVENOUS at 08:32

## 2021-01-10 RX ADMIN — SODIUM CHLORIDE, POTASSIUM CHLORIDE, SODIUM LACTATE AND CALCIUM CHLORIDE: 600; 310; 30; 20 INJECTION, SOLUTION INTRAVENOUS at 12:32

## 2021-01-10 RX ADMIN — LIDOCAINE HYDROCHLORIDE 35 MG: 10 INJECTION, SOLUTION INFILTRATION; PERINEURAL at 08:07

## 2021-01-10 RX ADMIN — PROPAFENONE HYDROCHLORIDE 150 MG: 150 TABLET, COATED ORAL at 13:43

## 2021-01-10 RX ADMIN — Medication 3 G: at 08:02

## 2021-01-10 RX ADMIN — SIMVASTATIN 40 MG: 40 TABLET, FILM COATED ORAL at 22:03

## 2021-01-10 RX ADMIN — CEFAZOLIN SODIUM 2 G: 2 INJECTION, SOLUTION INTRAVENOUS at 16:42

## 2021-01-10 RX ADMIN — CEFAZOLIN SODIUM 2 G: 2 INJECTION, SOLUTION INTRAVENOUS at 23:41

## 2021-01-10 RX ADMIN — HYDROMORPHONE HYDROCHLORIDE 0.4 MG: 1 INJECTION, SOLUTION INTRAMUSCULAR; INTRAVENOUS; SUBCUTANEOUS at 12:31

## 2021-01-10 RX ADMIN — MEMANTINE 10 MG: 5 TABLET ORAL at 19:49

## 2021-01-10 RX ADMIN — HYDROMORPHONE HYDROCHLORIDE 0.5 MG: 1 INJECTION, SOLUTION INTRAMUSCULAR; INTRAVENOUS; SUBCUTANEOUS at 09:48

## 2021-01-10 RX ADMIN — PROPOFOL 160 MG: 10 INJECTION, EMULSION INTRAVENOUS at 08:07

## 2021-01-10 RX ADMIN — ACETAMINOPHEN 975 MG: 325 TABLET, FILM COATED ORAL at 13:46

## 2021-01-10 RX ADMIN — HEPARIN SODIUM 1200 UNITS/HR: 10000 INJECTION, SOLUTION INTRAVENOUS at 13:39

## 2021-01-10 RX ADMIN — ONDANSETRON HYDROCHLORIDE 4 MG: 2 INJECTION, SOLUTION INTRAVENOUS at 08:39

## 2021-01-10 RX ADMIN — SODIUM CHLORIDE, POTASSIUM CHLORIDE, SODIUM LACTATE AND CALCIUM CHLORIDE: 600; 310; 30; 20 INJECTION, SOLUTION INTRAVENOUS at 08:02

## 2021-01-10 RX ADMIN — OXYCODONE HYDROCHLORIDE 10 MG: 5 TABLET ORAL at 23:37

## 2021-01-10 RX ADMIN — OXYCODONE HYDROCHLORIDE 5 MG: 5 TABLET ORAL at 13:46

## 2021-01-10 RX ADMIN — OXYCODONE HYDROCHLORIDE 10 MG: 5 TABLET ORAL at 18:48

## 2021-01-10 RX ADMIN — FENTANYL CITRATE 50 MCG: 50 INJECTION, SOLUTION INTRAMUSCULAR; INTRAVENOUS at 09:36

## 2021-01-10 RX ADMIN — ONDANSETRON 4 MG: 2 INJECTION INTRAMUSCULAR; INTRAVENOUS at 17:55

## 2021-01-10 RX ADMIN — FENTANYL CITRATE 50 MCG: 50 INJECTION, SOLUTION INTRAMUSCULAR; INTRAVENOUS at 09:25

## 2021-01-10 ASSESSMENT — ACTIVITIES OF DAILY LIVING (ADL)
ADLS_ACUITY_SCORE: 18
ADLS_ACUITY_SCORE: 17
ADLS_ACUITY_SCORE: 17

## 2021-01-10 ASSESSMENT — ENCOUNTER SYMPTOMS: DYSRHYTHMIAS: 1

## 2021-01-10 ASSESSMENT — COPD QUESTIONNAIRES: COPD: 0

## 2021-01-10 ASSESSMENT — LIFESTYLE VARIABLES: TOBACCO_USE: 0

## 2021-01-10 NOTE — ANESTHESIA PREPROCEDURE EVALUATION
Anesthesia Pre-Procedure Evaluation    Patient: Todd S Aschoff   MRN: [id number] : 1955          Preoperative Diagnosis: Hematoma [T14.8XXA]    Procedure(s):  IRRIGATION AND DEBRIDEMENT, LEFT CALF, POSSIBLE VAC OF LEFT CALF HEMATOMA    Past Medical History:   Diagnosis Date     Alcohol dependence (H)      Allergy, unspecified not elsewhere classified     seasonal     Antiplatelet or antithrombotic long-term use     coumadin/lovenox     Aortic valve prosthesis present      Atrial fibrillation (H)      Blood transfusion     after heart surg      BPH (benign prostatic hypertrophy)      Chronic infection     low back wound incision not healing      Chronic pain     lower back and right leg and left leg     Coagulation disorder (H)     on blood thinners     Dissection of aorta, thoracic (H)     [employer] F aotic valve + arch graft      Headache(784.0)      Heart murmur     aortic valve replaced and arch     History of spinal cord injury      Low back pain      Major depression      Mixed hyperlipidemia      Numbness and tingling     right leg post surg rightt hip/ also left leg since surg     OA (osteoarthritis)     hips     Obesity, unspecified      Prostate infection      Sciatica     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     Unspecified hypothyroidism      Past Surgical History:   Procedure Laterality Date     AORTIC VALVE REPLACEMENT      St. Jose F's valve     C TOTAL HIP ARTHROPLASTY  2010    Left AMANDA     C TOTAL HIP ARTHROPLASTY  2002    R hip replacement comp's by nerve injury with pain down into R leg, nadya below knee     CATARACT IOL, RT/LT      rt eye only     CHOLECYSTECTOMY, LAPOROSCOPIC  8/10     COLONOSCOPY N/A 1/15/2015    Procedure: COLONOSCOPY;  Surgeon: Johnson Kothari MD;  Location:  GI     DECOMPRESSION LUMBAR ONE LEVEL  4/3/2013    Procedure: DECOMPRESSION LUMBAR ONE LEVEL;  Open Decompression L3-4 bilateral;  Surgeon: Khalif Casas MD;  Location:  OR      DECOMPRESSION, FUSION CERVICAL ANTERIOR ONE LEVEL, COMBINED  3/23/2012    Procedure:COMBINED DECOMPRESSION, FUSION CERVICAL ANTERIOR ONE LEVEL; Anterior Cervical Decompression and Fusion C4-6; Surgeon:KHALIF COFFEY; Location:[facility] OR     EXPLORE SPINE, REMOVE HARDWARE, COMBINED  5/23/2013    Procedure: COMBINED EXPLORE SPINE, REMOVE HARDWARE;  Exploration Lumbar Wound for fluid collection;  Surgeon: Khalif Coffey MD;  Location: [facility] OR     FUSION CERVICAL ANTERIOR TWO LEVELS  3/26/2012    Procedure:FUSION CERVICAL ANTERIOR TWO LEVELS; Anterior Cervical Fusion C4-6, Anterior Cervical  Hematoma Evacuation; Surgeon:KHALIF COFFEY; Location:[facility] OR     IRRIGATION AND DEBRIDEMENT SPINE, CLOSE WOUND, COMBINED  3/26/2012    Procedure:COMBINED IRRIGATION AND DEBRIDEMENT SPINE, CLOSE WOUND; Surgeon:KHALIF COFFEY; Location:[facility] OR     removal of cyst of back   2.5week     TONSILLECTOMY       wisdom teeth[       ZZC NONSPECIFIC PROCEDURE  1992    repair of TAA with graft     Anesthesia Evaluation     .             ROS/MED HX    ENT/Pulmonary:     (+)ROSALIE risk factors obese, , . .   (-) tobacco use, asthma, COPD and Other pulmonary disease   Neurologic:     (+)other neuro Tourette's Synd   (-) Dementia, Neuropathy and migraines   Cardiovascular:     (+) Dyslipidemia, ----. Taking blood thinners : . . . :. dysrhythmias a-fib, valvular problems/murmurs s/p AVR:.      (-) hypertension, CAD, CHF and pulmonary hypertension   METS/Exercise Tolerance:     Hematologic:        (-) anemia   Musculoskeletal:   (+) arthritis,  other musculoskeletal- Chronic Back Pain, S/P ACDF      GI/Hepatic:        (-) GERD and hepatitis   Renal/Genitourinary:      (-) renal disease   Endo:     (+) thyroid problem hypothyroidism, Obesity, .      Psychiatric:     (+) psychiatric history depression      Infectious Disease:   (+) MRSA,       Malignancy:         Other:    (+) H/O Chronic Pain,                        Physical  Exam      Airway   Mallampati: II  TM distance: >3 FB  Neck ROM: full    Dental     Cardiovascular   Rhythm and rate: regular and normal  (-) no murmur    Pulmonary    breath sounds clear to auscultation    Other findings: Lab Test        01/10/21     01/10/21     01/10/21     01/09/21     01/09/21     01/09/21     12/08/20     12/08/20                       0636          0633          0209          2213          1246          1246          1328          1328          WBC           --          6.0           --           --           --          6.0           --          6.0           HGB           --          14.7          --           --           --          15.6          --          10.9*         MCV           --          99            --           --           --          98            --          97            PLT           --          214           --           --           --          246           --          196           INR          1.88*         --          2.22*        2.74*          < >        3.26*          < >        1.60*          < > = values in this interval not displayed.                  Lab Test        01/10/21     01/09/21     12/08/20                       0633          1246          1328          NA           138          140          141           POTASSIUM    3.8          4.3          4.1           CHLORIDE     107          105          108           CO2          30           34*          32            BUN          15           13           12            CR           0.93         1.02         0.86          ANIONGAP     1*           1*           1*            CATERINA          8.8          8.7          8.7           GLC          85           146*         170*                Lab Results   Component Value Date    WBC 6.0 01/10/2021    HGB 14.7 01/10/2021    HCT 45.7 01/10/2021     01/10/2021    CRP 2.9 11/27/2020    SED 30 (H) 04/18/2013     01/10/2021    POTASSIUM 3.8 01/10/2021     CHLORIDE 107 01/10/2021    CO2 30 01/10/2021    BUN 15 01/10/2021    CR 0.93 01/10/2021    GLC 85 01/10/2021    CATERINA 8.8 01/10/2021    PHOS 2.6 08/14/2010    MAG 2.1 08/14/2010    ALBUMIN 3.4 01/09/2021    PROTTOTAL 6.9 01/09/2021    ALT 23 01/09/2021    AST 28 01/09/2021    ALKPHOS 96 01/09/2021    BILITOTAL 1.0 01/09/2021    LIPASE 22 08/10/2010    PTT 46 (H) 01/09/2021    INR 1.88 (H) 01/10/2021    TSH 2.17 11/30/2020    T4 0.87 10/29/2019       Preop Vitals  BP Readings from Last 3 Encounters:   01/10/21 122/71   12/11/20 102/60   12/08/20 139/78    Pulse Readings from Last 3 Encounters:   01/10/21 63   12/11/20 86   12/08/20 80      Resp Readings from Last 3 Encounters:   01/10/21 16   12/11/20 16   12/08/20 18    SpO2 Readings from Last 3 Encounters:   01/10/21 94%   12/11/20 100%   12/08/20 95%      Temp Readings from Last 1 Encounters:   01/10/21 97.5  F (36.4  C) (Temporal)    Ht Readings from Last 1 Encounters:   01/09/21 1.829 m (6')      Wt Readings from Last 1 Encounters:   01/09/21 140.6 kg (309 lb 14.4 oz)    Estimated body mass index is 42.03 kg/m  as calculated from the following:    Height as of this encounter: 1.829 m (6').    Weight as of this encounter: 140.6 kg (309 lb 14.4 oz).       Anesthesia Plan      History & Physical Review  History and physical reviewed and following examination; no interval change.    ASA Status:  3 .    NPO Status:  > 8 hours    Plan for General with Propofol induction. Maintenance will be Balanced.    PONV prophylaxis:  Ondansetron (or other 5HT-3)  Additional equipment: Videolaryngoscope        Postoperative Care  Postoperative pain management:  IV analgesics.      Consents                 Aurelio Winston MD                    .

## 2021-01-10 NOTE — H&P
Admitted:     01/09/2021      CHIEF COMPLAINT:  Left calf pain since injury on 12/07, now with bleeding when the scab came off.      HISTORY OF PRESENT ILLNESS:  This is a 65-year-old white male with a history of thoracic aortic aneurysm, status post aortic valve replacement and repair of the thoracic aortic aneurysm with a graft, on Coumadin, due to a mechanical valve in 1992; atrial fibrillation on Coumadin and Rythmol, who slipped in his home in the kitchen on 12/07.  At that point he hurt his shoulder, which has gotten better, and he also somehow hurt his left lower extremity.  It was swollen.  He actually came to the ER.  Since that time he denies any worsening pain in the left lower extremity.  He denies any worsening swelling; however, today one of the scabs came off and he was bleeding and given the fact that he is on blood thinners that worried him and he came in to the ER for further evaluation.  He denies any fevers or chills.  He denies any chest pain or shortness of breath.  He denies any nausea, vomiting, lightheadedness or dizziness.  In the ER over here, he was seen by the ED physician, Dr. Mayo.  I discussed care with him.  He had a CTA of the left lower extremity, which showed a hyperdense fluid collection in the medial left calf measuring 29 x 6.7 x 4.2 cm.  Dr. Mayo spoke to orthopedic surgeon, Dr. Kemp, who actually intends to take him to the operating room tomorrow as there is concern that this could become infected.  I am asked to admit him for further evaluation.      PAST MEDICAL HISTORY:  Significant for atrial fibrillation, mechanical aortic valve replacement, on Coumadin; chronic low back pain, hyperlipidemia, depression, osteoarthritis.      PAST SURGICAL HISTORY:  Significant for repair of thoracic aortic aneurysm with a graft and St. Jose F's metallic valve in 1992, cataract surgery, decompression of the anterior cervical spine in 2012, decompression of the lumbar spine in 2013,  total left hip arthroplasty on the right.      FAMILY HISTORY:  Significant for stroke in his father.      SOCIAL HISTORY:  He does not smoke or drink alcohol.      HOME MEDICATIONS:  List awaits reconciliation, but includes Valium, Aricept, Synthroid, Namenda, Lyrica, Rythmol, Zocor, Coumadin.      REVIEW OF SYSTEMS:  As mentioned in the HPI.  He states that he does not have any exertional chest pain.  He states that he had a recent stress test; however, he is unable to tell me where it was done or how long ago.  All other systems are reviewed and deemed unremarkable and negative.      PHYSICAL EXAMINATION:   VITAL SIGNS:  His temperature is 97.5, pulse 63, blood pressure is 134/65, respiratory rate 16, O2 sat is 99% on room air.   GENERAL:  He is alert, awake, oriented x 3, in no acute distress.   HEENT:  Pupils equal, round, react to light.   LUNGS:  Clear to auscultation bilaterally.   HEART:  Regular rate, S1, S2 normal.  He has a metallic click.  No murmurs or gallops.   ABDOMEN:  Soft, nontender, nondistended, with good bowel sounds.   EXTREMITIES:  He has 1+ edema of the left lower extremity.  He does have dressing on the left lower extremity, which I did not uncover as he had some bleeding prior, which was dressed; however, the dressing did not have any blood on it.  It is definitely swollen in the medial portion of the left calf.  His sensation is grossly within normal limits.  He moves all his extremities.      LABORATORY:  Labwork obtained here shows the following:  CMP was obtained which is grossly within normal limits other than a bicarbonate of 34.  A CBC with diff is grossly unremarkable.  His INR was 3.26.  Blood cultures obtained in the ER are pending.      He underwent a CTA of the left lower extremity which showed patent arteries in the left lower extremity, 3-vessel runoff, hyperdense fluid collection in the medial calf measuring 29 x 6.7 x 4.2 cm.      Given patient's history of recent fall and  being supratherapeutic on Coumadin with bleeding wounds, this likely represents a hematoma.  Given that the patient has a normal white blood count, abscess is less likely.  Multiple skin defect along the medial aspect of the left distal calf and ankle consistent with the patient's history of medial calf wounds.      ASSESSMENT AND PLAN:   1. Traumatic fall on  resulting in hematoma of the left lower extremity, now with bleeding after a scab fell off.  The patient is on Coumadin due to atrial fibrillation as well as his metallic aortic valve.  I will admit him as an inpatient.  Orthopedic Surgery has been consulted and plan to take him to the operating room.  The patient has been given 1 mg IV vitamin K over here.  I will follow his INR and if his INR goes below 2 he should be placed on unfractionated heparin.  We will consult Orthopedic Surgery.   2. AFib. He is on rhythm control with propafenone; we will resume that once reconciled.  At this point in time I do not see any urgent need to completely reverse the patient as he is hemodynamically stable with no active bleeding that I can discern.      CODE STATUS:  Full code.      DISPOSITION:  He will be admitted as an inpatient.            SARAVANAN HERRERA MD             D: 2021   T: 2021   MT: HAILE      Name:     ASCHOFF, TODD   MRN:      [id number]        Account:      [id number]   :      1955        Admitted:     2021                   Document: [id number]       cc: Obdulio Ingram MD

## 2021-01-10 NOTE — ED NOTES
[facility]  ED Nurse Handoff Report    Todd S Aschoff is a 65 year old male   ED Chief complaint: Left leg wound  . ED Diagnosis:   Final diagnoses:   None     Allergies:   Allergies   Allergen Reactions     Gabapentin      Severe behavioral disturbances       Code Status: Full Code  Activity level - Baseline/Home:  Independent. Activity Level - Current:   Assist X 1. Lift room needed: No. Bariatric: No   Needed: No   Isolation: No. Infection: Not Applicable.     Vital Signs:   Vitals:    01/09/21 1600 01/09/21 1601 01/09/21 1602 01/09/21 1800   BP: 138/88   134/85   Pulse: 64   63   Resp:       Temp:       SpO2:  99% 99% 99%       Cardiac Rhythm:  ,      Pain level:    Patient confused: No. Patient Falls Risk: Yes.   Elimination Status: Has voided   Patient Report - Initial Complaint: LLE wound. Focused Assessment: Presents to ED for LLE wound. Pt had fall late 2020, suffered LLE wound that was determined to not be infectious in nature. Today, the scab to that wound fell off, causing pt to bleed. Pt takes Coumadin for artificial valve so INR is high. CTA of left lower extremity shows patent arteries and large hematoma. Due to high INR and size of hematoma, patient is being admitted. ABCs intact. PT A&OX4.   Tests Performed: Labs, CTA LLE. Abnormal Results:   Labs Ordered and Resulted from Time of ED Arrival Up to the Time of Departure from the ED   COMPREHENSIVE METABOLIC PANEL - Abnormal; Notable for the following components:       Result Value    Carbon Dioxide 34 (*)     Anion Gap 1 (*)     Glucose 146 (*)     All other components within normal limits   INR - Abnormal; Notable for the following components:    INR 3.26 (*)     All other components within normal limits   PARTIAL THROMBOPLASTIN TIME - Abnormal; Notable for the following components:    PTT 46 (*)     All other components within normal limits   INR - Abnormal; Notable for the following components:    INR 3.27 (*)     All  other components within normal limits   CBC WITH PLATELETS DIFFERENTIAL   CREATININE POCT   SARS-COV-2 (COVID-19) VIRUS RT-PCR   ISTAT CREATININE NURSING POCT   BLOOD CULTURE   BLOOD CULTURE     CTA Lower Extremity Left with Contrast   Preliminary Result   IMPRESSION:   1. Patent arteries throughout the left lower extremity. Three-vessel   runoff.   2. Hyperdense fluid collection in the medial left calf measuring 29 x   6.7 x 4.2 cm, given patient's history of recent fall and being   supratherapeutic on Coumadin with bleeding wounds this likely   represents a hematoma. Given that patient has a normal white blood   cell count abscess would be less likely.   3. Multiple skin defects along the medial aspect of the left distal   calf/ankle, consistent with patient's history of medial calf wounds      Findings were discussed with Dr. Mayo on 1/9/2021.        .   Treatments provided: See MAR (will give Vit K to reverse INR)  Family Comments: No family here.   OBS brochure/video discussed/provided to patient:  N/A  ED Medications:   Medications   HOLD: warfarin (COUMADIN) therapy (has no administration in time range)   phytonadione (AQUA-MEPHYTON) 1 mg in sodium chloride 0.9 % 50 mL intermittent infusion (1 mg Intravenous Given 1/9/21 1814)   iopamidol (ISOVUE-370) solution 120 mL (120 mLs Intravenous Given 1/9/21 1414)   sodium chloride (PF) 0.9% PF flush 95 mL (95 mLs Intravenous Given 1/9/21 1414)     Drips infusing:  No  For the majority of the shift, the patient's behavior Green. Interventions performed were N/A.    Sepsis treatment initiated: No     Patient tested for COVID 19 prior to admission: YES    ED Nurse Name/Phone Number: Teresita Pederson RN,     6:04 PM    RECEIVING UNIT ED HANDOFF REVIEW    Above ED Nurse Handoff Report was reviewed: Yes  Reviewed by: Nohemy Plasencia RN on January 9, 2021 at 6:38 PM   Did you vocera the ED RN: yes at 1436

## 2021-01-10 NOTE — ANESTHESIA POSTPROCEDURE EVALUATION
Patient: Nicko SHINE Aschoff    Procedure(s):  IRRIGATION AND DEBRIDEMENT of LEFT medial  CALF HEMATOMA with wound size 9x3 cm, primary closure of 9 cm long wound    Diagnosis:Hematoma [T14.8XXA]  Diagnosis Additional Information: No value filed.    Anesthesia Type:  General    Note:  Anesthesia Post Evaluation    Patient location during evaluation: PACU  Patient participation: Able to fully participate in evaluation  Level of consciousness: awake  Pain management: adequate  Airway patency: patent  Cardiovascular status: acceptable  Respiratory status: acceptable  Hydration status: euvolemic  PONV: controlled     Anesthetic complications: None          Last vitals:  Vitals:    01/10/21 1325 01/10/21 1440 01/10/21 1531   BP: (!) 153/91  (!) 163/87   Pulse: 72  75   Resp: 10 11 12   Temp: 98  F (36.7  C)  97.2  F (36.2  C)   SpO2: 100%  95%         Electronically Signed By: Aurelio Winston MD  January 10, 2021  4:29 PM

## 2021-01-10 NOTE — UTILIZATION REVIEW
[facility] Utilization Review  Admission Status; Secondary Review Determination     Admission Date: 1/9/2021 12:36 PM      Under the authority of the Utilization Management Committee, the utilization review process indicated a secondary review on the above patient.  The review outcome is based on review of the medical records, discussions with staff, and applying clinical experience noted on the date of the review.        (X)      Inpatient Status Appropriate - This patient's medical care is consistent with medical management for inpatient care and reasonable inpatient medical practice.          RATIONALE FOR DETERMINATION   Todd S Aschoff is a 65 year old male with past medical history of mechanical aortic valve on chronic anticoagulation who presented to the emergency room with worsening drainage from left lower extremity hematoma/wound, sustained a month ago.  Initial evaluation revealing for draining wound, hematoma but no leukocytosis.  Orthopedic surgery is consulted who recommended incision and drainage with IV antibiotics.  However, cares are rendered complex due to anticoagulation status and high risk of valve thrombosis if interrupted.  He is started on IV heparin and underwent surgical intervention today.  Coumadin is being held and will be resumed post surgery.  Plan is to bridge anticoagulation, provide postoperative cares, pain control and close monitoring.  Once therapeutic on warfarin, likely discharge to home.  In light of complexity of care, anticoagulation issues and high risk of valve thrombosis and need for perioperative care with anticipated length of stay more than 2 midnights, he is appropriately admitted as inpatient for medical management.          The information on this document is developed by the utilization review team in order for the business office to ensure compliance.  This only denotes the appropriateness of proper admission status and does not reflect the quality of  care rendered.              Sincerely,       Babita Weller MD, MS  Physician Advisor  Utilization Review-[place]    Phone: [phone]

## 2021-01-10 NOTE — PLAN OF CARE
A&Ox4,  Tele: SR 60's.forgetful at times. Up with SBAx1, but can independent and walk steadily except LLE pain with ambulation. VSS:Tele SR. NPO from midnight. CMS: intact. Drsg: new dressing applied. Pt took shower, chris wipes done. Possible I&D today..

## 2021-01-10 NOTE — CONSULTS
[facility]    Orthopedics Consultation    Date of Admission:  1/9/2021    Assessment & Plan   This is a 65-year-old white male with a history of thoracic aortic aneurysm, status post aortic valve replacement and repair of the thoracic aortic aneurysm with a graft, on Coumadin, due to a mechanical valve in 1992; atrial fibrillation on Coumadin and Rythmol, who slipped in his home in the kitchen on 12/07/21 with CTA of the left lower extremity, which showed a hyperdense fluid collection in the medial left calf measuring 29 x 6.7 x 4.2 cm with no active extravasation.    Indicated for irrigation and excisional debridement with evacuation hematoma and primary wound closure versus wound VAC application.  Risks, benefits and alternatives discussed in detail with the patient.  My concern given the large size of the hematoma and chronic drainage is that he will ultimately develop a large infected hematoma which could seed his aortic valve or bilateral total hip replacements.  Given the patient's poor overall health status this could be life-threatening and involve sepsis.  I therefore recommend evacuation of this large hematoma with placement of a deep drain and compressive wrap with as noted above primary wound closure versus wound VAC application.  The risk of bleeding, infection nerve damage were discussed.  The patient verbalized understanding and in light of potential infection and benefits of irrigation debridement elects to proceed.  Signed written consent has been obtained and placed on the chart.    Active Problems:    Leg wound, left    Hematoma    Rod Kemp MD     Code Status    Full Code    Reason for Consult   Reason for consult: I was asked by the emergency department to evaluate this patient for left medial calf chronic draining wound with large underlying Coumadin associated hematoma.    Primary Care Physician   Obdulio Ingram MD    Chief Complaint   Left distal medial calf  chronic draining wound    History is obtained from the patient, electronic health record and emergency department physician    History of Present Illness   This is a 65-year-old white male with a history of thoracic aortic aneurysm, status post aortic valve replacement and repair of the thoracic aortic aneurysm with a graft, on Coumadin, due to a mechanical valve in 1992; atrial fibrillation on Coumadin and Rythmol, who slipped in his home in the kitchen on 12/07/21.  At that point he hurt his shoulder, which has gotten better, and he also somehow hurt his left lower extremity.  It was swollen.  He actually came to the ER.  Since that time he denies any worsening pain in the left lower extremity.  He denies any worsening swelling; however, today one of the scabs came off and he was bleeding and given the fact that he is on blood thinners that worried him and he came in to the ER for further evaluation.  He denies any fevers or chills.  He denies any chest pain or shortness of breath.  He denies any nausea, vomiting, lightheadedness or dizziness.  He had a CTA of the left lower extremity, which showed a hyperdense fluid collection in the medial left calf measuring 29 x 6.7 x 4.2 cm with no active extravasation.  He was also noted to have a chronic draining wound distal medial and 2 other dry mature eschars proximal to this.  There was no surrounding erythema or concern for infection.  However given the large size of hematoma and chronic drainage orthopedic is consulted for evaluation and treatment recommendations.  Patient denies any significant pain.  Denies any numbness, tingling or weakness distally.  Has been weightbearing as tolerated.     Past Medical History   I have reviewed this patient's medical history and updated it with pertinent information if needed.   Past Medical History:   Diagnosis Date     Alcohol dependence (H)      Allergy, unspecified not elsewhere classified     seasonal     Antiplatelet or  antithrombotic long-term use     coumadin/lovenox     Aortic valve prosthesis present      Atrial fibrillation (H)      Blood transfusion     after heart surg 1992     BPH (benign prostatic hypertrophy)      Chronic infection     low back wound incision not healing      Chronic pain     lower back and right leg and left leg     Coagulation disorder (H)     on blood thinners     Dissection of aorta, thoracic (H) 1992    St Jose F aotic valve + arch graft 1992     Headache(784.0)      Heart murmur     aortic valve replaced and arch     History of spinal cord injury      Low back pain      Major depression      Mixed hyperlipidemia      Numbness and tingling     right leg post surg rightt hip/ also left leg since surg     OA (osteoarthritis)     hips     Obesity, unspecified      Prostate infection      Sciatica 2002    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  Past Surgical History:   Procedure Laterality Date     AORTIC VALVE REPLACEMENT  1992    St. Jose F's valve     C TOTAL HIP ARTHROPLASTY  2010    Left AMANDA     C TOTAL HIP ARTHROPLASTY  2002    R hip replacement comp's by nerve injury with pain down into R leg, nadya below knee     CATARACT IOL, RT/LT      rt eye only     CHOLECYSTECTOMY, LAPOROSCOPIC  8/10     COLONOSCOPY N/A 1/15/2015    Procedure: COLONOSCOPY;  Surgeon: Johnson Kothari MD;  Location:  GI     DECOMPRESSION LUMBAR ONE LEVEL  4/3/2013    Procedure: DECOMPRESSION LUMBAR ONE LEVEL;  Open Decompression L3-4 bilateral;  Surgeon: Travis Coffey MD;  Location:  OR     DECOMPRESSION, FUSION CERVICAL ANTERIOR ONE LEVEL, COMBINED  3/23/2012    Procedure:COMBINED DECOMPRESSION, FUSION CERVICAL ANTERIOR ONE LEVEL; Anterior Cervical Decompression and Fusion C4-6; Surgeon:TRAVIS COFFEY; Location: OR     EXPLORE SPINE, REMOVE  HARDWARE, COMBINED  5/23/2013    Procedure: COMBINED EXPLORE SPINE, REMOVE HARDWARE;  Exploration Lumbar Wound for fluid collection;  Surgeon: Khalif Coffey MD;  Location: [facility] OR     FUSION CERVICAL ANTERIOR TWO LEVELS  3/26/2012    Procedure:FUSION CERVICAL ANTERIOR TWO LEVELS; Anterior Cervical Fusion C4-6, Anterior Cervical  Hematoma Evacuation; Surgeon:KHALIF COFFEY; Location:[facility] OR     IRRIGATION AND DEBRIDEMENT SPINE, CLOSE WOUND, COMBINED  3/26/2012    Procedure:COMBINED IRRIGATION AND DEBRIDEMENT SPINE, CLOSE WOUND; Surgeon:KHALIF COFFEY; Location:[facility] OR     removal of cyst of back   2.5week     TONSILLECTOMY       wisdom teeth[       ZZC NONSPECIFIC PROCEDURE  1992    repair of TAA with graft       Prior to Admission Medications   Prior to Admission Medications   Prescriptions Last Dose Informant Patient Reported? Taking?   MAPAP ARTHRITIS PAIN 650 MG CR tablet 1/9/2021 at am  No Yes   Sig: Take 1 tablet (650 mg) by mouth 2 times daily   azelastine (ASTELIN) 0.1 % nasal spray   No Yes   Sig: USE 2 SPRAYS IN NOSTRIL 2 TIMES DAILY AS NEEDED.   cetirizine (ZYRTEC) 10 MG tablet   No Yes   Sig: TAKE ONE TABLET BY MOUTH ONCE DAILY AS NEEDED   cyclobenzaprine (FLEXERIL) 10 MG tablet 1/9/2021 at am  No Yes   Sig: TAKE ONE TABLET BY MOUTH THREE TIMES DAILY AS NEEDED FOR MUSCLE SPASM.   diazepam (VALIUM) 2 MG tablet 1/9/2021 at am  No Yes   Sig: Take 1 tablet (2 mg) by mouth every 12 hours as needed for pain (try not to use over one daily)   donepezil (ARICEPT) 10 MG tablet 1/8/2021 at Unknown time  No Yes   Sig: Take 2 tablets (20 mg) by mouth At Bedtime Stop if diarrhea or nausea develop   folic acid (FOLVITE) 1 MG tablet Past Week at Unknown time  No Yes   Sig: Take 5 tablets (5 mg) by mouth daily   guanFACINE (TENEX) 1 MG tablet 1/8/2021 at Unknown time  No Yes   Sig: Take 1 tablet (1 mg) by mouth At Bedtime   levothyroxine (SYNTHROID/LEVOTHROID) 150 MCG tablet 1/8/2021 at hs  No Yes   Sig:  Take 1 tablet (150 mcg) by mouth daily   liothyronine (CYTOMEL) 25 MCG tablet 1/8/2021 at hs  No Yes   Sig: Take 1 tablet (25 mcg) by mouth daily   melatonin 3 MG tablet Past Week at Unknown time  No Yes   Sig: Take 1 tablet (3 mg) by mouth nightly as needed for sleep   memantine (NAMENDA) 10 MG tablet 1/8/2021 at Unknown time  Yes Yes   Sig: Take 10 mg by mouth 2 times daily   mirabegron (MYRBETRIQ) 50 MG 24 hr tablet 1/8/2021 at pm  No Yes   Sig: Take 1 tablet (50 mg) by mouth daily   pregabalin (LYRICA) 100 MG capsule 1/9/2021 at am  No Yes   Sig: Take 1 capsule (100 mg) by mouth 3 times daily Do not take if sleepy/sedated.   propafenone (RYTHMOL) 150 MG TABS tablet 1/9/2021 at am  No Yes   Sig: Take 1 tablet (150 mg) by mouth every 8 hours   senna-docusate (SENOKOT-S;PERICOLACE) 8.6-50 MG per tablet   No Yes   Sig: Take 1 tablet by mouth 2 times daily as needed for constipation   simvastatin (ZOCOR) 40 MG tablet 1/8/2021 at Unknown time  No Yes   Sig: Take 1 tablet (40 mg) by mouth At Bedtime. Need to update cholesterol level before additional refills.   triamcinolone (KENALOG) 0.1 % external cream   No Yes   Sig: Apply topically 2 times daily as needed (rash/itching)   venlafaxine (EFFEXOR-XR) 150 MG 24 hr capsule 1/8/2021 at Unknown time  No Yes   Sig: Take 2 capsules (300 mg) by mouth daily   warfarin ANTICOAGULANT (COUMADIN) 5 MG tablet 1/8/2021 at 5mg  Yes Yes   Sig: Take 5 mg by mouth daily Takes 5 mg every day except Sunday. On Sundays, pt takes 7.5 mg.   warfarin ANTICOAGULANT (COUMADIN) 5 MG tablet 1/3/2021 at 7.5mg  Yes Yes   Sig: Take 7.5 mg by mouth once a week Takes 7.5 mg every Sunday. Pt takes 5 mg all other days. Of note, patient took 7.5 mg 1/7 (Thursday) per provider request.      Facility-Administered Medications: None     Allergies   Allergies   Allergen Reactions     Gabapentin      Severe behavioral disturbances       Social History   I have reviewed this patient's social history and  updated it with pertinent information if needed. Todd S Aschoff  reports that he has never smoked. He has never used smokeless tobacco. He reports that he does not drink alcohol or use drugs.    Family History   I have reviewed this patient's family history and updated it with pertinent information if needed.   Family History   Problem Relation Age of Onset     Cerebrovascular Disease Father          age 86, had M.I. ,diabetic also     Prostate Cancer Father      Cancer Mother          age 83 of dementia, also h/o lymphoma     Hypertension Brother         2 brothers with hypertension & sleep apnea     Hypertension Sister         Born ~1936     Sleep Apnea Brother          age 78, Alzheimers     No Known Problems Son      No Known Problems Daughter      No Known Problems Son      Family History Negative Brother         Had 5 brothers, three still alive as of 2019     Colon Cancer No family hx of        Review of Systems   The 10 point Review of Systems is negative other than noted in the HPI or here.     Physical Exam   Temp: 97.2  F (36.2  C) Temp src: Temporal BP: (!) 159/103 Pulse: 71   Resp: 25 SpO2: 97 % O2 Device: None (Room air) Oxygen Delivery: 6 LPM  Vital Signs with Ranges  Temp:  [97.1  F (36.2  C)-97.8  F (36.6  C)] 97.2  F (36.2  C)  Pulse:  [61-86] 71  Resp:  [9-25] 25  BP: (106-177)/() 159/103  SpO2:  [93 %-100 %] 97 %  309 lbs 14.4 oz    Constitutional: awake, alert, cooperative, no apparent distress, and appears stated age  Eyes: extra-ocular muscles intact  ENT: normocepalic, atraumatic without obvious abnormality  Hematologic / Lymphatic: No lymphedema   Respiratory: no increased work of breathing  Skin: 5 cm x 3 cm chronic draining wound distal medial calf.  Just distal to this is a 1 cm x 5 mm chronic draining wound.  Proximal to these are two 1.5 cm x 1.5 cm areas of superficial skin necrosis with healthy granulation tissue beneath mature eschar.  Otherwise skin is clean, dry  and intact.  No erythema, ecchymosis, swelling or warmth.  There is firmness along the length of the documented hematoma.  Musculoskeletal: There is no redness, warmth, or swelling of the joints.  Full range of motion noted.  Motor strength is 5 out of 5 all extremities bilaterally.  Tone is normal.  Neurologic: CMS intact distally.  Intact sensation in lateral femoral cutaneous, saphenous, sural, superficial peroneal, deep peroneal and tibial distributions.    Motor intact in quadriceps, hamstrings, abductors, tibialis anterior, extensor hallucis longus, and gastrocsoleus.   Pedal pulses intact and capillary refill brisk.  Mental Status Exam:  Level of Alertness:   awake  Orientation:   person, place, time  Memory:   normal  Fund of Knowledge:  normal  Attention/Concentration:  normal  Language:  normal  Neuropsychiatric: General: normal, calm and normal eye contact  Level of consciousness: alert / normal  Affect: normal  Orientation: oriented to self, place, time and situation  Memory and insight: normal, memory for past and recent events intact and thought process normal    Data   Results for orders placed or performed during the hospital encounter of 01/09/21 (from the past 24 hour(s))   CBC + differential   Result Value Ref Range    WBC 6.0 4.0 - 11.0 10e9/L    RBC Count 4.83 4.4 - 5.9 10e12/L    Hemoglobin 15.6 13.3 - 17.7 g/dL    Hematocrit 47.3 40.0 - 53.0 %    MCV 98 78 - 100 fl    MCH 32.3 26.5 - 33.0 pg    MCHC 33.0 31.5 - 36.5 g/dL    RDW 13.9 10.0 - 15.0 %    Platelet Count 246 150 - 450 10e9/L    Diff Method Automated Method     % Neutrophils 64.7 %    % Lymphocytes 23.3 %    % Monocytes 9.0 %    % Eosinophils 2.2 %    % Basophils 0.5 %    % Immature Granulocytes 0.3 %    Nucleated RBCs 0 0 /100    Absolute Neutrophil 3.9 1.6 - 8.3 10e9/L    Absolute Lymphocytes 1.4 0.8 - 5.3 10e9/L    Absolute Monocytes 0.5 0.0 - 1.3 10e9/L    Absolute Eosinophils 0.1 0.0 - 0.7 10e9/L    Absolute Basophils 0.0 0.0 -  0.2 10e9/L    Abs Immature Granulocytes 0.0 0 - 0.4 10e9/L    Absolute Nucleated RBC 0.0    Comprehensive metabolic panel   Result Value Ref Range    Sodium 140 133 - 144 mmol/L    Potassium 4.3 3.4 - 5.3 mmol/L    Chloride 105 94 - 109 mmol/L    Carbon Dioxide 34 (H) 20 - 32 mmol/L    Anion Gap 1 (L) 3 - 14 mmol/L    Glucose 146 (H) 70 - 99 mg/dL    Urea Nitrogen 13 7 - 30 mg/dL    Creatinine 1.02 0.66 - 1.25 mg/dL    GFR Estimate 77 >60 mL/min/[1.73_m2]    GFR Estimate If Black 90 >60 mL/min/[1.73_m2]    Calcium 8.7 8.5 - 10.1 mg/dL    Bilirubin Total 1.0 0.2 - 1.3 mg/dL    Albumin 3.4 3.4 - 5.0 g/dL    Protein Total 6.9 6.8 - 8.8 g/dL    Alkaline Phosphatase 96 40 - 150 U/L    ALT 23 0 - 70 U/L    AST 28 0 - 45 U/L   INR   Result Value Ref Range    INR 3.26 (H) 0.86 - 1.14   PTT   Result Value Ref Range    PTT 46 (H) 22 - 37 sec   Blood culture    Specimen: Blood    Left Arm   Result Value Ref Range    Specimen Description Blood Left Arm     Culture Micro No growth after 14 hours    Creatinine POCT   Result Value Ref Range    Creatinine 1.0 0.66 - 1.25 mg/dL    GFR Estimate 75 >60 mL/min/[1.73_m2]    GFR Estimate If Black >90 >60 mL/min/[1.73_m2]   CTA Lower Extremity Left with Contrast    Narrative    CTA LOWER EXTREMITY LEFT WITH CONTRAST   1/9/2021 2:29 PM     HISTORY: Purple foot with bleeding wounds on medial calf, wooden  feeling: question limb ischemia vs possible infection. History of fall  on coumadin.     TECHNIQUE:  CTA left lower extremity with runoff to the toes after the  administration of 120mL Isovue-370 IV. Radiation dose for this scan  was reduced using automated exposure control, adjustment of the mA  and/or kV according to patient size, or iterative reconstruction  technique. 3D images were created at an independent workstation under  concurrent supervision at the request of the ordering provider.    COMPARISON: None.    FINDINGS:   VASCULATURE: Visualized portion of the left common iliac  artery is  patent. The left internal and external iliac arteries are patent.  Common femoral and profunda femoral arteries are patent. The  superficial femoral and popliteal artery are patent. Atherosclerotic  disease is noted throughout the tibial vessels, with three-vessel  runoff. On the medial aspect of the left calf there is a large  hyperdense fluid collection measuring 29 x 6.7 x 4.2 cm.    MUSCULOSKELETAL/SOFT TISSUES: At least 2 skin defects are noted along  the medial aspect of the left distal calf/ankle for example (series 5,  image 303 and series 5, image 313), consistent with history of  bleeding medial calf wound. No suspicious bony lesions. Postoperative  changes of left hip arthroplasty with a edy in the left femur.      Impression    IMPRESSION:  1. Patent arteries throughout the left lower extremity. Three-vessel  runoff.  2. Hyperdense fluid collection in the medial left calf measuring 29 x  6.7 x 4.2 cm, given patient's history of recent fall and being  supratherapeutic on Coumadin with bleeding wounds this likely  represents a hematoma. Given that patient has a normal white blood  cell count abscess would be less likely.  3. Multiple skin defects along the medial aspect of the left distal  calf/ankle, consistent with patient's history of medial calf wounds    Findings were discussed with Dr. Mayo on 1/9/2021.   Blood culture    Specimen: Blood    Left Hand   Result Value Ref Range    Specimen Description Blood Left Hand     Culture Micro No growth after 12 hours    Asymptomatic SARS-CoV-2 COVID-19 Virus (Coronavirus) by PCR    Specimen: Nasopharyngeal   Result Value Ref Range    SARS-CoV-2 Virus Specimen Source Nasopharyngeal     SARS-CoV-2 PCR Result NEGATIVE     SARS-CoV-2 PCR Comment (Note)    INR   Result Value Ref Range    INR 3.27 (H) 0.86 - 1.14   INR   Result Value Ref Range    INR 2.74 (H) 0.86 - 1.14   INR   Result Value Ref Range    INR 2.22 (H) 0.86 - 1.14   CBC with platelets    Result Value Ref Range    WBC 6.0 4.0 - 11.0 10e9/L    RBC Count 4.61 4.4 - 5.9 10e12/L    Hemoglobin 14.7 13.3 - 17.7 g/dL    Hematocrit 45.7 40.0 - 53.0 %    MCV 99 78 - 100 fl    MCH 31.9 26.5 - 33.0 pg    MCHC 32.2 31.5 - 36.5 g/dL    RDW 14.0 10.0 - 15.0 %    Platelet Count 214 150 - 450 10e9/L   Basic metabolic panel   Result Value Ref Range    Sodium 138 133 - 144 mmol/L    Potassium 3.8 3.4 - 5.3 mmol/L    Chloride 107 94 - 109 mmol/L    Carbon Dioxide 30 20 - 32 mmol/L    Anion Gap 1 (L) 3 - 14 mmol/L    Glucose 85 70 - 99 mg/dL    Urea Nitrogen 15 7 - 30 mg/dL    Creatinine 0.93 0.66 - 1.25 mg/dL    GFR Estimate 87 >60 mL/min/[1.73_m2]    GFR Estimate If Black >90 >60 mL/min/[1.73_m2]    Calcium 8.8 8.5 - 10.1 mg/dL   INR   Result Value Ref Range    INR 1.88 (H) 0.86 - 1.14     *Note: Due to a large number of results and/or encounters for the requested time period, some results have not been displayed. A complete set of results can be found in Results Review.

## 2021-01-10 NOTE — PHARMACY-ADMISSION MEDICATION HISTORY
Admission medication history interview status for this patient is complete. See [facility] admission navigator for allergy information, prior to admission medications and immunization status.     Medication history interview done via telephone during Covid-19 pandemic, indicate source(s): Patient  Medication history resources (including written lists, pill bottles, clinic record):None      Changes made to PTA medication list:  Added: none  Deleted: none  Changed: none    Actions taken by pharmacist (provider contacted, etc):None     Additional medication history information:None    Medication reconciliation/reorder completed by provider prior to medication history?  n   (Y/N)          Prior to Admission medications    Medication Sig Last Dose Taking? Auth Provider   azelastine (ASTELIN) 0.1 % nasal spray USE 2 SPRAYS IN NOSTRIL 2 TIMES DAILY AS NEEDED.  Yes Obdulio Ingram MD   cetirizine (ZYRTEC) 10 MG tablet TAKE ONE TABLET BY MOUTH ONCE DAILY AS NEEDED  Yes Obdulio Ingram MD   cyclobenzaprine (FLEXERIL) 10 MG tablet TAKE ONE TABLET BY MOUTH THREE TIMES DAILY AS NEEDED FOR MUSCLE SPASM. 1/9/2021 at am Yes Emily Taylor APRN CNP   diazepam (VALIUM) 2 MG tablet Take 1 tablet (2 mg) by mouth every 12 hours as needed for pain (try not to use over one daily) 1/9/2021 at am Yes Aurelio Veronica MD   donepezil (ARICEPT) 10 MG tablet Take 2 tablets (20 mg) by mouth At Bedtime Stop if diarrhea or nausea develop 1/8/2021 at Unknown time Yes Aurelio Veronica MD   folic acid (FOLVITE) 1 MG tablet Take 5 tablets (5 mg) by mouth daily Past Week at Unknown time Yes Aurelio Veronica MD   guanFACINE (TENEX) 1 MG tablet Take 1 tablet (1 mg) by mouth At Bedtime 1/8/2021 at Unknown time Yes Obdulio Ingram MD   levothyroxine (SYNTHROID/LEVOTHROID) 150 MCG tablet Take 1 tablet (150 mcg) by mouth daily 1/8/2021 at hs Yes Obdulio Ingram MD   liothyronine (CYTOMEL) 25 MCG tablet Take 1 tablet (25 mcg) by mouth daily 1/8/2021 at hs Yes  Aurelio Veronica MD   MAPAP ARTHRITIS PAIN 650 MG CR tablet Take 1 tablet (650 mg) by mouth 2 times daily 1/9/2021 at am Yes Obdulio Ingram MD   melatonin 3 MG tablet Take 1 tablet (3 mg) by mouth nightly as needed for sleep Past Week at Unknown time Yes Aurelio Veronica MD   memantine (NAMENDA) 10 MG tablet Take 10 mg by mouth 2 times daily 1/8/2021 at Unknown time Yes Unknown, Entered By History   mirabegron (MYRBETRIQ) 50 MG 24 hr tablet Take 1 tablet (50 mg) by mouth daily 1/8/2021 at pm Yes Obdulio Ingram MD   pregabalin (LYRICA) 100 MG capsule Take 1 capsule (100 mg) by mouth 3 times daily Do not take if sleepy/sedated. 1/9/2021 at am Yes Obdulio Ingram MD   propafenone (RYTHMOL) 150 MG TABS tablet Take 1 tablet (150 mg) by mouth every 8 hours 1/9/2021 at am Yes Obdulio Ingram MD   senna-docusate (SENOKOT-S;PERICOLACE) 8.6-50 MG per tablet Take 1 tablet by mouth 2 times daily as needed for constipation  Yes Obdulio Ingram MD   simvastatin (ZOCOR) 40 MG tablet Take 1 tablet (40 mg) by mouth At Bedtime. Need to update cholesterol level before additional refills. 1/8/2021 at Unknown time Yes Obdulio Ingram MD   triamcinolone (KENALOG) 0.1 % external cream Apply topically 2 times daily as needed (rash/itching)  Yes Obdulio Ingram MD   venlafaxine (EFFEXOR-XR) 150 MG 24 hr capsule Take 2 capsules (300 mg) by mouth daily 1/8/2021 at Unknown time Yes Obdulio Ingram MD   warfarin ANTICOAGULANT (COUMADIN) 5 MG tablet Take 5 mg by mouth daily Takes 5 mg every day except Sunday. On Sundays, pt takes 7.5 mg. 1/8/2021 at 5mg Yes Reported, Patient   warfarin ANTICOAGULANT (COUMADIN) 5 MG tablet Take 7.5 mg by mouth once a week Takes 7.5 mg every Sunday. Pt takes 5 mg all other days. Of note, patient took 7.5 mg 1/7 (Thursday) per provider request. 1/3/2021 at 7.5mg Yes Reported, Patient

## 2021-01-10 NOTE — PLAN OF CARE
Patient arrived back on floor at 1100 from PACU. Alert and oriented. On 2L O2. On remote telemetry. Tolerating regular diet. Stood at bedside with Ax2, gait belt, and walker. Spoke with ortho PA regarding inability to fit post-op shoe due to size of surgical dressing, orthotics consult ordered. Dressing CDI, LLE elevated on pillows. Hemovac patent. Voiding in adequate amounts. Pain managed with scheduled Tylenol and prn Oxycodone. Heparin gtts started at 1300 to bridge with Coumadin. IV Zofran given for nausea, nausea improved. On IV Ancef.

## 2021-01-10 NOTE — TREATMENT PLAN
[facility]    Orthopedics Preliminary Consult Note    Chart reviewed.    Todd S Aschoff is a 65 year old male with large chronic draining calf hematoma with necrotic open wound per report.    Indicated for I&D and possible VAC.    Will evaluate patient on 1/10/21.    Please discontinue Coumadin to correct INR to < 2 preoperatively.    Will recheck INR at 0600.    Tentatively plan for OR @ 0800 on 1/10/21.    Full consult note to follow.    Please call with questions.    Rod Kemp MD  Orthopedic Trauma and Arthroplasty  [facility]  [phone]

## 2021-01-10 NOTE — PHARMACY-ANTICOAGULATION SERVICE
Clinical Pharmacy - Warfarin Dosing Consult     Pharmacy has been consulted to manage this patient s warfarin therapy.  Indication: Mechanical Aortic Valve Replacement  Therapy Goal: INR 2-3  Warfarin Prior to Admission: Yes  Warfarin PTA Regimen: 7.5 mg Sun  5 mg ROW  Significant drug interactions: Heparin  Recent documented change in oral intake/nutrition: Unknown    INR   Date Value Ref Range Status   01/10/2021 1.70 (H) 0.86 - 1.14 Final   01/10/2021 1.88 (H) 0.86 - 1.14 Final       Recommend warfarin 5 mg today.  Pharmacy will monitor Todd S Aschoff daily and order warfarin doses to achieve specified goal.      Please contact pharmacy as soon as possible if the warfarin needs to be held for a procedure or if the warfarin goals change.      ADDENDUM: INR goal 2.5 - 3.5; patient has higher goal range due to [facility] aortic valve placed in 1992 and additional risk factor of afib. [facility]

## 2021-01-10 NOTE — OP NOTE
[facility]    Orthopedic Operative Note    Todd S Aschoff MRN# [id number]   YOB: 1955  Procedure Date:  1/10/2021  Age: 65 year old     PREOPERATIVE DIAGNOSIS:    1.  Left distal medial calf chronic draining wounds measuring 5 cm x  3 cm and 2 cm x 1 cm  2.  Left medial calf chronic hematoma measuring 29 x 6.7 x 4.2 cm on CT angiography    POSTOPERATIVE DIAGNOSIS:    1.  Left distal medial calf chronic draining wound measuring 5 cm x 3 cm   2.  Left medial calf chronic hematoma measuring 29 x 6.7 x 4.2 cm on CT angiography    PROCEDURE PERFORMED:    1.  Irrigation and excisional debridement to muscle left distal medial calf chronic wounds total area measuring 9 cm x 4 cm  2.  Irrigation and excisional debridement to fascia left medial calf chronic hematoma measuring 29 x 6.7 x 4.2 cm  3.  Primary complex wound closure left medial distal calf 9 cm in length    SURGEON:  Rod Kemp MD    FIRST ASSISTANT:  Carolina High PA-C. Her assistance was critical during transfer, positioning, preparation, draping, surgical approach, irrigation debridement, wound closure and placement of dressings. Her assistance allowed me to operate efficiently, decreasing surgical time and risk.     ANESTHESIA:  General    EBL: 25 mL    DRAINS: Hemovac x1 to self suction    SPECIMENS:  ID Type Source Tests Collected by Time Destination   1 : left calf tissue for cultures Tissue Leg Lower, Left ANAEROBIC BACTERIAL CULTURE, GRAM STAIN, TISSUE CULTURE AEROBIC BACTERIAL Rod Kemp MD 1/10/2021  8:32 AM          COMPLICATIONS: None     DISPOSITION: Post Anesthesia Care Unit     CONDITION: Stable     INDICATIONS:  Todd S Aschoff  is a 65 year old male presenting with a large chronic draining hematoma of the left calf likely sustained as result of an injury on 12/7/2020 and related to his chronic Coumadin use as outlined above and in consultation note dated 1/10/2021. Discussed both operative  and nonoperative management. Risks of surgery discussed included but not limited to bleeding, infection, damage to surrounding neurovascular structures.  Likelihood that hematoma will ultimately become infected which could place his aortic valve and bilateral total replacements at risk was discussed in detail.  Recommendation for irrigation and debridement of the chronic draining open wound and evacuation of large hematoma was discussed.  Patient acknowledges risks and wishes to proceed. Signed and informed consent placed on the chart.      PROCEDURE: Patient was identified in the preoperative holding area and the left calf was marked with indelible marker.  Patient was brought in the operating room and underwent induction of general anesthesia.  Patient was subsequently transferred in a supine position to the operating room table.  After induction of general anesthesia the left lower extremity was placed on a bone foam ramp and a tourniquet was applied to the left thigh.  Left lower extremity prepped and draped in normal sterile fashion.  Antibiotic administration confirmed and timeout completed.  Attention first turned toward irrigation and excisional debridement.  We incorporated both distal medial wounds for a total wound length of 9 cm with a total wound width of 4 cm.  We performed excisional debridement with a #10 scalpel blade through skin, subcutaneous tissue down to the fascia.  Some fascia was debrided exposing deep posterior compartment musculature.  Were careful to avoid neurovascular structures medially.  Once the debridement of this area was completed we milke the hematoma from the proximal distal to evacuate a large degree of it.  We lysed a Pulido elevator to explore the hematoma cavity to determine where to make our proximal counterincision.  We made a 2 cm proximal counterincision medially in order to communicate completely with the hematoma cavity.  We then utilized a Pulido elevator to aggressively  scrape the interior of the hematoma cavity including the undersurface of the subcutaneous layer and the fascial layer removing the entire rind of the hematoma cavity.  This completed the excisional debridement of the hematoma cavity with measurements outlined above.  We then copiously irrigated the hematoma cavity with low flow cystoscopy tubing with 3 L of sterile saline.  We then placed a Hemovac drain exiting proximal anterior to the hematoma cavity and placed along the entire length of the cavity.  We then turned our to us for closure.  Subcutaneous layers were closed with 2-0 PDS and skin was closed with 3-0 nylon in vertical mattress fashion.  Dry sterile dressings applied with Xeroform, 4 x 4's and sterile cotton roll as well as bulky cotton.  Ace wrap was applied firmly.  Patient subsequently awakened from general anesthesia and taken to recovery room in stable condition.  There were no complications and patient tolerated seizure well.    PLAN:  Weight-bear as tolerated in hard sole shoe  Elevation  Maintain compressive surgical dressing until follow-up in 2 weeks to prevent reaccumulation of hematoma  Maintain Hemovac drain until less than 15 cc per 8-hour shift after patient has resumed anticoagulation  24 hours IV antibiotics per standard postop protocol  10 to 14 days of postoperative p.o. antibiotics for prophylaxis given his mechanical valve and total hip arthroplasties  Pain control  Resume prior to admission Coumadin postop day #0  Follow-up in clinic in 2 weeks for wound check and possible suture removal  Patient is well spoken to postoperatively regarding procedure performed and plan going forward.  All question right in detail.    Rod Kemp MD  Orthopedic Trauma and Arthroplasty  [facility]  [phone]

## 2021-01-10 NOTE — PROGRESS NOTES
[facility]    Medicine Progress Note - Hospitalist Service       Date of Admission:  1/9/2021  Date of Service: 01/10/2021    Assessment & Plan     This is a 65-year-old gentleman with a past medical history significant for aortic dissection s/p mechanical aortic valve plus graft in 1992, atrial fibrillation, on chronic anticoagulation with warfarin, chronic pain, depression, who presented to the hospital with concerns for leg wound.    Patient had a fall about a month ago and he developed a hematoma in his left lower extremity.  This had persisted and he had developed a draining wound.  Given these concerns he was admitted to the hospital for further management.    #Left lower extremity hematoma.  S/p fall in December 2020.  Now with a chronic draining wound.  -Greatly appreciate orthopedics consultation.  Patient received vitamin K to bring his INR below 2 and underwent surgery 1/10 for I&D  -Currently patient is afebrile, normal white count.  Follow wound cultures  -Received prophylactic antibiotic and plan to continue antibiotics for 10 to 14 days per orthopedics for prophylaxis  -Management of anticoagulation as above  -Defer postoperative cares including wound management, drain management, therapies per orthopedics expertise    #Chronic anticoagulation with warfarin for mechanical aortic valve and atrial fibrillation.  -Patient received vitamin K prior to his surgery.  INR is 1.7.  I discussed the case with Dr. Kemp.  Given his mechanical valve he is at high risk for thrombotic issues and will ideally bridge him with heparin drip.  Per Ortho, okay to start him on heparin drip 4 hours after the surgery, he will be started on heparin drip at 1 PM today, and also resume him on his warfarin although it might take a few days for the INR to come to therapeutic range.    -Stop the heparin drip once his INR is above 2.  If he is ready for discharge before then then we could use Lovenox as  well    #Atrial fibrillation.  Resume the patient on his propafenone    #Chronic pain.  Patient is on Lyrica. Can resume once he is more awake and alert after his surgery.     #Hypothyroidism.  Patient is on levothyroxine and liothyronine.    #Depression.  He is on Effexor.      Diet: Advance Diet as Tolerated: Regular Diet Adult    DVT Prophylaxis: As above   Zimmer Catheter: not present  Code Status: Full Code      Disposition Plan   Expected discharge: 2 - 3 days, pending post-op recovery and evaluation by therapies   Entered: Isreal Philippe MD 01/10/2021, 12:28 PM       The patient's care was discussed with the Bedside Nurse and Patient.    > 35 minutes spent in the care of this patient today     Isreal Philippe MD  Hospitalist Service  [facility]    ______________________________________________________________________    Interval History   Chart reviewed and patient seen. Case discussed with nursing staff.     Patient has just come back from the OR, he reports that he feels well, Denies any chest pain, shortness of breath. No reports of abdominal pain or vomiting.  Feels a little bit tired but denies any other complaints.  Reports that his pain is better now in the leg after receiving the medications.  No new complaints voiced otherwise.       Data reviewed today: I reviewed all medications, new labs and imaging results over the last 24 hours. I personally reviewed    Physical Exam   Vital Signs: Temp: 97.4  F (36.3  C) Temp src: Temporal BP: (!) 159/89 Pulse: 87   Resp: 10 SpO2: 97 % O2 Device: Nasal cannula Oxygen Delivery: 2 LPM  Weight: 309 lbs 14.4 oz    GENERAL:  Sleepy but appropriately responds post-op, No acute distress.  PSYCH: appropriate affect, no acute agitation   HEENT:  Neck is Supple, trachea is midline, EOMI, conjunctiva clear  CARDIOVASCULAR: Regular rate and rhythm, Normal S1, S2, mechanical valve click   PULMONARY:  Clear bilaterally. Good air entry on both  sides  GI: Abdomen is soft, non tender, non-distended, bowel sounds present. No rebound or guarding   SKIN:  No cyanosis or clubbing, no obvious exanthems on exposed areas   MSK: dressing with a hemovac on the left leg   Neuro: Awake and oriented x 3. Grossly non-focal     Data   Recent Labs   Lab 01/10/21  1007 01/10/21  0636 01/10/21  0633 01/10/21  0209 01/09/21  1246 01/09/21  1246   WBC  --   --  6.0  --   --  6.0   HGB  --   --  14.7  --   --  15.6   MCV  --   --  99  --   --  98   PLT  --   --  214  --   --  246   INR 1.70* 1.88*  --  2.22*   < > 3.26*   NA  --   --  138  --   --  140   POTASSIUM  --   --  3.8  --   --  4.3   CHLORIDE  --   --  107  --   --  105   CO2  --   --  30  --   --  34*   BUN  --   --  15  --   --  13   CR  --   --  0.93  --   --  1.02   ANIONGAP  --   --  1*  --   --  1*   CATERINA  --   --  8.8  --   --  8.7   GLC  --   --  85  --   --  146*   ALBUMIN  --   --   --   --   --  3.4   PROTTOTAL  --   --   --   --   --  6.9   BILITOTAL  --   --   --   --   --  1.0   ALKPHOS  --   --   --   --   --  96   ALT  --   --   --   --   --  23   AST  --   --   --   --   --  28    < > = values in this interval not displayed.       Recent Results (from the past 24 hour(s))   CTA Lower Extremity Left with Contrast    Narrative    CTA LOWER EXTREMITY LEFT WITH CONTRAST   1/9/2021 2:29 PM     HISTORY: Purple foot with bleeding wounds on medial calf, wooden  feeling: question limb ischemia vs possible infection. History of fall  on coumadin.     TECHNIQUE:  CTA left lower extremity with runoff to the toes after the  administration of 120mL Isovue-370 IV. Radiation dose for this scan  was reduced using automated exposure control, adjustment of the mA  and/or kV according to patient size, or iterative reconstruction  technique. 3D images were created at an independent workstation under  concurrent supervision at the request of the ordering provider.    COMPARISON: None.    FINDINGS:   VASCULATURE: Visualized  portion of the left common iliac artery is  patent. The left internal and external iliac arteries are patent.  Common femoral and profunda femoral arteries are patent. The  superficial femoral and popliteal artery are patent. Atherosclerotic  disease is noted throughout the tibial vessels, with three-vessel  runoff. On the medial aspect of the left calf there is a large  hyperdense fluid collection measuring 29 x 6.7 x 4.2 cm.    MUSCULOSKELETAL/SOFT TISSUES: At least 2 skin defects are noted along  the medial aspect of the left distal calf/ankle for example (series 5,  image 303 and series 5, image 313), consistent with history of  bleeding medial calf wound. No suspicious bony lesions. Postoperative  changes of left hip arthroplasty with a edy in the left femur.      Impression    IMPRESSION:  1. Patent arteries throughout the left lower extremity. Three-vessel  runoff.  2. Hyperdense fluid collection in the medial left calf measuring 29 x  6.7 x 4.2 cm, given patient's history of recent fall and being  supratherapeutic on Coumadin with bleeding wounds this likely  represents a hematoma. Given that patient has a normal white blood  cell count abscess would be less likely.  3. Multiple skin defects along the medial aspect of the left distal  calf/ankle, consistent with patient's history of medial calf wounds    Findings were discussed with Dr. Mayo on 1/9/2021.    RAI CARRILLO,      Medications     heparin       lactated ringers       - MEDICATION INSTRUCTIONS -       Warfarin Therapy Reminder         acetaminophen  975 mg Oral TID     ceFAZolin  2 g Intravenous Q8H     docusate sodium  100 mg Oral BID     levothyroxine  150 mcg Oral Daily     liothyronine  25 mcg Oral Daily     [START ON 1/11/2021] polyethylene glycol  17 g Oral Daily     propafenone  150 mg Oral Q8H     senna-docusate  1 tablet Oral BID     simvastatin  40 mg Oral At Bedtime     sodium chloride (PF)  3 mL Intracatheter Q8H     sodium  chloride (PF)  3 mL Intracatheter Q8H     venlafaxine  300 mg Oral Daily

## 2021-01-10 NOTE — PLAN OF CARE
Nurse from [id number]: Pt admitted for left lower extremity wound, dressing was placed in ER and is intact, no drainage noted. Pt is A&O x 4, oriented to room and call light, educated on fall risk and to call for help when wanting to get out of bed. Uses call light appropriately and is within reach. Pt c/o of back pain, states this is chronic, PRN hydrocodone given with adequate relief of pain.

## 2021-01-10 NOTE — ANESTHESIA PROCEDURE NOTES
Airway   Date/Time: 1/10/2021 8:09 AM   Patient location during procedure: OR    Staff -   CRNA: Angely Kuo APRN CRNA  Performed By: CRNA    Consent for Airway   Urgency: elective    Indications and Patient Condition  Indications for airway management: alejandra-procedural  Induction type:intravenous    Final Airway Details  Final airway type: supraglottic airway        Post intubation assessment   Placement verified by: capnometry   Number of attempts at approach: 1  Ease of procedure: easy  Dentition: Intact

## 2021-01-10 NOTE — ANESTHESIA CARE TRANSFER NOTE
Patient: Nicko SHINE Aschoff    Procedure(s):  IRRIGATION AND DEBRIDEMENT of LEFT medial  CALF HEMATOMA with wound size 9x3 cm, primary closure of 9 cm long wound    Diagnosis: Hematoma [T14.8XXA]  Diagnosis Additional Information: No value filed.    Anesthesia Type:   General     Note:  Airway :Face Mask  Patient transferred to:PACU  Comments: Adequate spontaneous respirations. Handoff Report: Identifed the Patient, Identified the Reponsible Provider, Reviewed the pertinent medical history, Discussed the surgical course, Reviewed Intra-OP anesthesia mangement and issues during anesthesia, Set expectations for post-procedure period and Allowed opportunity for questions and acknowledgement of understanding      Vitals: (Last set prior to Anesthesia Care Transfer)    CRNA VITALS  1/10/2021 0835 - 1/10/2021 0910      1/10/2021             Pulse:  81    SpO2:  98 %                Electronically Signed By: AMARIS Fraire CRNA  January 10, 2021  9:10 AM

## 2021-01-11 ENCOUNTER — APPOINTMENT (OUTPATIENT)
Dept: PHYSICAL THERAPY | Facility: CLINIC | Age: 66
End: 2021-01-11
Payer: COMMERCIAL

## 2021-01-11 LAB
GLUCOSE SERPL-MCNC: 100 MG/DL (ref 70–99)
HGB BLD-MCNC: 13.6 G/DL (ref 13.3–17.7)
INR PPP: 1.36 (ref 0.86–1.14)
UFH PPP CHRO-ACNC: 0.43 IU/ML

## 2021-01-11 PROCEDURE — 258N000003 HC RX IP 258 OP 636: Performed by: PHYSICIAN ASSISTANT

## 2021-01-11 PROCEDURE — 36415 COLL VENOUS BLD VENIPUNCTURE: CPT | Performed by: PHYSICIAN ASSISTANT

## 2021-01-11 PROCEDURE — 250N000013 HC RX MED GY IP 250 OP 250 PS 637: Performed by: INTERNAL MEDICINE

## 2021-01-11 PROCEDURE — 85018 HEMOGLOBIN: CPT | Performed by: PHYSICIAN ASSISTANT

## 2021-01-11 PROCEDURE — 99232 SBSQ HOSP IP/OBS MODERATE 35: CPT | Performed by: INTERNAL MEDICINE

## 2021-01-11 PROCEDURE — 85610 PROTHROMBIN TIME: CPT | Performed by: PHYSICIAN ASSISTANT

## 2021-01-11 PROCEDURE — 97161 PT EVAL LOW COMPLEX 20 MIN: CPT | Mod: GP | Performed by: PHYSICAL THERAPIST

## 2021-01-11 PROCEDURE — 120N000001 HC R&B MED SURG/OB

## 2021-01-11 PROCEDURE — 85520 HEPARIN ASSAY: CPT | Performed by: ORTHOPAEDIC SURGERY

## 2021-01-11 PROCEDURE — 250N000011 HC RX IP 250 OP 636: Performed by: INTERNAL MEDICINE

## 2021-01-11 PROCEDURE — 250N000013 HC RX MED GY IP 250 OP 250 PS 637: Performed by: PHYSICIAN ASSISTANT

## 2021-01-11 PROCEDURE — 36415 COLL VENOUS BLD VENIPUNCTURE: CPT | Performed by: ORTHOPAEDIC SURGERY

## 2021-01-11 PROCEDURE — 97116 GAIT TRAINING THERAPY: CPT | Mod: GP | Performed by: PHYSICAL THERAPIST

## 2021-01-11 PROCEDURE — 97530 THERAPEUTIC ACTIVITIES: CPT | Mod: GP | Performed by: PHYSICAL THERAPIST

## 2021-01-11 PROCEDURE — 82947 ASSAY GLUCOSE BLOOD QUANT: CPT | Performed by: PHYSICIAN ASSISTANT

## 2021-01-11 RX ORDER — ACETAMINOPHEN 500 MG
1000 TABLET ORAL 3 TIMES DAILY
Status: ON HOLD
Start: 2021-01-11 | End: 2021-02-07

## 2021-01-11 RX ORDER — CEFTRIAXONE 1 G/1
1 INJECTION, POWDER, FOR SOLUTION INTRAMUSCULAR; INTRAVENOUS EVERY 24 HOURS
Status: DISCONTINUED | OUTPATIENT
Start: 2021-01-11 | End: 2021-01-13

## 2021-01-11 RX ORDER — OXYCODONE HYDROCHLORIDE 5 MG/1
5 TABLET ORAL EVERY 4 HOURS PRN
Qty: 15 TABLET | Refills: 0 | Status: ON HOLD | OUTPATIENT
Start: 2021-01-11 | End: 2021-02-07

## 2021-01-11 RX ORDER — CEPHALEXIN 500 MG/1
500 CAPSULE ORAL 4 TIMES DAILY
Qty: 56 CAPSULE | Refills: 0 | Status: SHIPPED | OUTPATIENT
Start: 2021-01-11 | End: 2021-01-12

## 2021-01-11 RX ORDER — MIRABEGRON 50 MG/1
TABLET, FILM COATED, EXTENDED RELEASE ORAL
Qty: 90 TABLET | Refills: 0 | OUTPATIENT
Start: 2021-01-11

## 2021-01-11 RX ADMIN — HEPARIN SODIUM 1200 UNITS/HR: 10000 INJECTION, SOLUTION INTRAVENOUS at 09:19

## 2021-01-11 RX ADMIN — OXYCODONE HYDROCHLORIDE 10 MG: 5 TABLET ORAL at 20:41

## 2021-01-11 RX ADMIN — MEMANTINE 10 MG: 5 TABLET ORAL at 08:18

## 2021-01-11 RX ADMIN — VENLAFAXINE HYDROCHLORIDE 300 MG: 150 CAPSULE, EXTENDED RELEASE ORAL at 08:17

## 2021-01-11 RX ADMIN — PROPAFENONE HYDROCHLORIDE 150 MG: 150 TABLET, COATED ORAL at 20:40

## 2021-01-11 RX ADMIN — OXYCODONE HYDROCHLORIDE 10 MG: 5 TABLET ORAL at 12:35

## 2021-01-11 RX ADMIN — POLYETHYLENE GLYCOL 3350 17 G: 17 POWDER, FOR SOLUTION ORAL at 08:18

## 2021-01-11 RX ADMIN — LIOTHYRONINE SODIUM 25 MCG: 25 TABLET ORAL at 08:18

## 2021-01-11 RX ADMIN — MEMANTINE 10 MG: 5 TABLET ORAL at 20:31

## 2021-01-11 RX ADMIN — DOCUSATE SODIUM 100 MG: 100 CAPSULE, LIQUID FILLED ORAL at 20:31

## 2021-01-11 RX ADMIN — SODIUM CHLORIDE, POTASSIUM CHLORIDE, SODIUM LACTATE AND CALCIUM CHLORIDE: 600; 310; 30; 20 INJECTION, SOLUTION INTRAVENOUS at 02:33

## 2021-01-11 RX ADMIN — ENOXAPARIN SODIUM 135 MG: 150 INJECTION SUBCUTANEOUS at 20:32

## 2021-01-11 RX ADMIN — PROPAFENONE HYDROCHLORIDE 150 MG: 150 TABLET, COATED ORAL at 14:05

## 2021-01-11 RX ADMIN — ACETAMINOPHEN 975 MG: 325 TABLET, FILM COATED ORAL at 20:31

## 2021-01-11 RX ADMIN — PROPAFENONE HYDROCHLORIDE 150 MG: 150 TABLET, COATED ORAL at 04:43

## 2021-01-11 RX ADMIN — PREGABALIN 100 MG: 100 CAPSULE ORAL at 14:05

## 2021-01-11 RX ADMIN — PREGABALIN 100 MG: 100 CAPSULE ORAL at 08:18

## 2021-01-11 RX ADMIN — DOCUSATE SODIUM 50 MG AND SENNOSIDES 8.6 MG 1 TABLET: 8.6; 5 TABLET, FILM COATED ORAL at 20:31

## 2021-01-11 RX ADMIN — MIRABEGRON 50 MG: 25 TABLET, FILM COATED, EXTENDED RELEASE ORAL at 22:45

## 2021-01-11 RX ADMIN — DONEPEZIL HYDROCHLORIDE 10 MG: 10 TABLET ORAL at 20:41

## 2021-01-11 RX ADMIN — ACETAMINOPHEN 975 MG: 325 TABLET, FILM COATED ORAL at 14:05

## 2021-01-11 RX ADMIN — OXYCODONE HYDROCHLORIDE 10 MG: 5 TABLET ORAL at 08:18

## 2021-01-11 RX ADMIN — LEVOTHYROXINE SODIUM 150 MCG: 150 TABLET ORAL at 08:17

## 2021-01-11 RX ADMIN — OXYCODONE HYDROCHLORIDE 10 MG: 5 TABLET ORAL at 17:11

## 2021-01-11 RX ADMIN — ACETAMINOPHEN 975 MG: 325 TABLET, FILM COATED ORAL at 08:17

## 2021-01-11 RX ADMIN — CEFTRIAXONE 1 G: 1 INJECTION, POWDER, FOR SOLUTION INTRAMUSCULAR; INTRAVENOUS at 11:36

## 2021-01-11 RX ADMIN — OXYCODONE HYDROCHLORIDE 10 MG: 5 TABLET ORAL at 03:45

## 2021-01-11 RX ADMIN — WARFARIN SODIUM 7.5 MG: 5 TABLET ORAL at 17:11

## 2021-01-11 RX ADMIN — PREGABALIN 100 MG: 100 CAPSULE ORAL at 20:31

## 2021-01-11 RX ADMIN — SIMVASTATIN 40 MG: 40 TABLET, FILM COATED ORAL at 22:45

## 2021-01-11 RX ADMIN — FOLIC ACID 5 MG: 1 TABLET ORAL at 08:18

## 2021-01-11 ASSESSMENT — ACTIVITIES OF DAILY LIVING (ADL)
ADLS_ACUITY_SCORE: 18
ADLS_ACUITY_SCORE: 17
ADLS_ACUITY_SCORE: 17
ADLS_ACUITY_SCORE: 16
ADLS_ACUITY_SCORE: 16
ADLS_ACUITY_SCORE: 17

## 2021-01-11 NOTE — CONSULTS
ID consult dictated IMP 1 64 yo huge subacute L thigh hematoma, not septic, I and D cx E coli    REC ceftriaxone, eventual po should be adequate await cx final

## 2021-01-11 NOTE — TELEPHONE ENCOUNTER
Patient has refills remaining with requesting pharmacy.  Nanci QUIROZ - Registered Nurse  [facility]  Acute and Diagnostic Services

## 2021-01-11 NOTE — PLAN OF CARE
Patient vital signs are at baseline: NO, on 1 L oxygen.   Patient able to ambulate as they were prior to admission or with assist devices provided by therapies during their stay:  No,  Reason: didn't get OOB overnight.  Patient MUST void prior to discharge:  Yes  Patient able to tolerate oral intake:  Yes  Pain has adequate pain control using Oral analgesics:  Yes, PRN oxycodone given with relief.     A&Ox4, VSS: on 1L oxygen NC sats on 90's. Tele: SR/. LLE elevated with Wedge pillow. CMS: can wiggle toes, denies any numbness and tingling. Drsg: CDI. Hemovac patent and compressed. Heprin infusing. Voiding adequately. ISO for MRSA..

## 2021-01-11 NOTE — TELEPHONE ENCOUNTER
Routed 12/28/2020 telephone encounter back to Dr. Ingram asking for recommendations and will close this encounter.

## 2021-01-11 NOTE — TELEPHONE ENCOUNTER
"Need to appeal:    \"Effexor  mg daily specifically recommended and prescribed by the patient's inpatient psychiatrist, Aurelio Veronica, during his recent hospitalization for depression and overdose ingestion (hospitalized 11/27-12/3/2020.  Interruption of therapy may result in significant relapse of symptoms.\"  "

## 2021-01-11 NOTE — PLAN OF CARE
Afebrile.  IV rocephin.  Heparin drip to DC when giving 1st dose lovenox this evening, bridged with coumadin.   Remote tele.  Pain managed with oral pain meds, cold + elevated blue wedge pillow.  LS clear bilaterally, RA, encouraged CDB/IS hourly.  Hemovac DC.  Drsg CDI, no bleeding through fr. drain site.  Voiding.  A1 belt walker + post-op shoe, L leg WBAT.  Plan is DC back to  home.

## 2021-01-11 NOTE — PROGRESS NOTES
01/11/21 1003   Quick Adds   Type of Visit Initial PT Evaluation   Living Environment   Living Environment Comments Pt lives in a [facility] with family, 14 [address] with unilateral railing- switches half way through. All needs met on main level. Pt with use of SEC at baseline. Wife using walker d/t ankle fracture in Nov.    Self-Care   Usual Activity Tolerance moderate   Current Activity Tolerance moderate   Equipment Currently Used at Home cane, straight;grab bar, tub/shower;grab bar, toilet;raised toilet seat   Disability/Function   Fall history within last six months yes   Number of times patient has fallen within last six months 1   General Information   Onset of Illness/Injury or Date of Surgery 01/09/20   Referring Physician Carolina High, DA   Patient/Family Therapy Goals Statement (PT) To return home   Pertinent History of Current Problem (include personal factors and/or comorbidities that impact the POC) Pt is a 65 year old male POD1 s/p I&D of fascia/muscle left distal medial calf chronic wounds and hematoma.  Pt with pmhx of thoracic aortic aneurysm, status post aortic valve replacement and repair of the thoracic aortic aneurysm with a graft, on Coumadin, due to a mechanical valve in 1992; atrial fibrillation on Coumadin and Rythmol.   Existing Precautions/Restrictions fall;weight bearing   Weight-Bearing Status - LLE weight-bearing as tolerated  (Post-op shoe)   Cognition   Orientation Status (Cognition) oriented x 4   Affect/Mental Status (Cognition) WFL   Follows Commands (Cognition) WFL   Pain Assessment   Patient Currently in Pain No   Range of Motion (ROM)   ROM Comment B LE WFL except L ankle   Strength   Strength Comments Pt functionally demonstrated >3/5 B LE strength   Bed Mobility   Comment (Bed Mobility) SBA supine <> sit with HOB raised   Transfers   Transfer Safety Comments Logan sit to stand with FWW   Gait/Stairs (Locomotion)   Distance in Feet (Required for LE Total Joints) 8   Pattern  (Gait) step-through   Maintains Weight-bearing Status (Gait) able to maintain   Comment (Gait/Stairs) CGA with FWW   Balance   Balance Comments good+ unsupported sitting; fair standing with FWW   Clinical Impression   Criteria for Skilled Therapeutic Intervention yes, treatment indicated   PT Diagnosis (PT) impaired functional mobility   Influenced by the following impairments impaired balance, impaired L ankle ROM   Functional limitations due to impairments impaired bed mobility, transfers, ambulation, stairs   Clinical Presentation Stable/Uncomplicated   Clinical Presentation Rationale Pt is medically stable   Clinical Decision Making (Complexity) low complexity   Therapy Frequency (PT) Daily   Predicted Duration of Therapy Intervention (days/wks) 3 days   Planned Therapy Interventions (PT) balance training;bed mobility training;gait training;home exercise program;patient/family education;stair training;strengthening;transfer training   Anticipated Equipment Needs at Discharge (PT) walker, rolling   Risk & Benefits of therapy have been explained evaluation/treatment results reviewed;care plan/treatment goals reviewed;risks/benefits reviewed;current/potential barriers reviewed;participants voiced agreement with care plan;participants included;patient   PT Discharge Planning    PT Discharge Recommendation (DC Rec) home with assist   PT Rationale for DC Rec Anticipate that with continued IP PT services that patient will demonstrate safe mobility in order to return home with use of FWW and assist from son-in-law for stair management.    PT Brief overview of current status  Ax1 with FWW   Total Evaluation Time   Total Evaluation Time (Minutes) 4

## 2021-01-11 NOTE — TELEPHONE ENCOUNTER
A letter of medical necessity needs to be written and placed in the patient's chart for an appeal to be completed.

## 2021-01-11 NOTE — PROGRESS NOTES
[facility]    Medicine Progress Note - Hospitalist Service       Date of Admission:  1/9/2021  Date of Service: 01/11/2021    Assessment & Plan     This is a 65-year-old gentleman with a past medical history significant for aortic dissection s/p mechanical aortic valve plus graft in 1992, atrial fibrillation, on chronic anticoagulation with warfarin, chronic pain, depression, who presented to the hospital with concerns for leg wound.    Patient had a fall about a month ago and he developed a hematoma in his left lower extremity.  This had persisted and he had developed a draining wound.  Given these concerns he was admitted to the hospital for further management.    #Left lower extremity hematoma.  S/p fall in December 2020.  Now with a chronic draining wound.  -Greatly appreciate orthopedics consultation.  Patient received vitamin K to bring his INR below 2 and underwent surgery 1/10 for I&D  -Currently patient is afebrile, normal white count.  Follow wound cultures  -Received prophylactic antibiotic post-op. Wound cultures today turned positive with E.Coli, will start IV ceftriaxone, ID consult, Blood cultures negative so far  -Management of anticoagulation as above  -Defer postoperative cares including wound management, drain management, therapies per orthopedics expertise    #Chronic anticoagulation with warfarin for mechanical aortic valve and atrial fibrillation.  -Patient received vitamin K prior to his surgery.  INR is 1.3.    -- Resume him on his warfarin although it might take a few days for the INR to come to therapeutic range.  Goal INR is 2.5 - 3.5 per pt   -- Bridging with heparin currently, will transition to lovenox once his drain is out, Stop heparin/lovenox once INR > 2.5.   -- Patient has used lovenox for bridging in the past and familiar with its use     #Atrial fibrillation.  Resume the patient on his propafenone    #Chronic pain.  Patient is on Lyrica.      #Hypothyroidism.  Patient is on levothyroxine and liothyronine.    #Depression.  He is on Effexor.      Diet: Advance Diet as Tolerated: Regular Diet Adult    DVT Prophylaxis: As above   Zimmer Catheter: not present  Code Status: Full Code      Disposition Plan   Expected discharge: 2 - 3 days, pending post-op recovery and evaluation by therapies   Entered: Isreal Philippe MD 01/11/2021, 11:43 AM       The patient's care was discussed with the Bedside Nurse and Patient.     Isreal Philippe MD  Hospitalist Service  [facility]    ______________________________________________________________________    Interval History   Chart reviewed and patient seen. Case discussed with nursing staff.     Patient is awake and alert, just worked with therapy, pain is controlled, waiting for his post-op boot, Denies any chest pain, shortness of breath. No reports of abdominal pain or vomiting.  No new complaints voiced otherwise.       Data reviewed today: I reviewed all medications, new labs and imaging results over the last 24 hours. I personally reviewed    Physical Exam   Vital Signs: Temp: 98.6  F (37  C) Temp src: Temporal BP: 119/64 Pulse: 73   Resp: 14 SpO2: 97 % O2 Device: None (Room air) Oxygen Delivery: 1 LPM  Weight: 309 lbs 14.4 oz    GENERAL:  Awake, alert , No acute distress.  PSYCH: appropriate affect, no acute agitation   HEENT:  Neck is Supple, trachea is midline, EOMI, conjunctiva clear  CARDIOVASCULAR: Regular rate and rhythm, Normal S1, S2, mechanical valve click   PULMONARY:  Clear bilaterally. Good air entry on both sides  GI: Abdomen is soft, non tender, non-distended, bowel sounds present. No rebound or guarding   SKIN:  No cyanosis or clubbing, no obvious exanthems on exposed areas   MSK: dressing with a hemovac on the left leg   Neuro: Awake and oriented x 3. Grossly non-focal     Data   Recent Labs   Lab 01/11/21  0719 01/10/21  1007 01/10/21  0636 01/10/21  0633  01/09/21  1246 01/09/21  1246   WBC  --   --   --  6.0  --  6.0   HGB 13.6  --   --  14.7  --  15.6   MCV  --   --   --  99  --  98   PLT  --   --   --  214  --  246   INR 1.36* 1.70* 1.88*  --    < > 3.26*   NA  --   --   --  138  --  140   POTASSIUM  --   --   --  3.8  --  4.3   CHLORIDE  --   --   --  107  --  105   CO2  --   --   --  30  --  34*   BUN  --   --   --  15  --  13   CR  --   --   --  0.93  --  1.02   ANIONGAP  --   --   --  1*  --  1*   CATERINA  --   --   --  8.8  --  8.7   *  --   --  85  --  146*   ALBUMIN  --   --   --   --   --  3.4   PROTTOTAL  --   --   --   --   --  6.9   BILITOTAL  --   --   --   --   --  1.0   ALKPHOS  --   --   --   --   --  96   ALT  --   --   --   --   --  23   AST  --   --   --   --   --  28    < > = values in this interval not displayed.       No results found for this or any previous visit (from the past 24 hour(s)).  Medications     heparin 1,200 Units/hr (01/11/21 0919)     lactated ringers Stopped (01/11/21 0827)     - MEDICATION INSTRUCTIONS -       Warfarin Therapy Reminder         acetaminophen  975 mg Oral TID     cefTRIAXone  1 g Intravenous Q24H     docusate sodium  100 mg Oral BID     donepezil  10 mg Oral At Bedtime     folic acid  5 mg Oral Daily     levothyroxine  150 mcg Oral Daily     liothyronine  25 mcg Oral Daily     memantine  10 mg Oral BID     mirabegron  50 mg Oral At Bedtime     polyethylene glycol  17 g Oral Daily     pregabalin  100 mg Oral TID     propafenone  150 mg Oral Q8H     senna-docusate  1 tablet Oral BID     simvastatin  40 mg Oral At Bedtime     sodium chloride (PF)  3 mL Intracatheter Q8H     venlafaxine  300 mg Oral Daily     warfarin ANTICOAGULANT  7.5 mg Oral ONCE at 18:00

## 2021-01-11 NOTE — CONSULTS
.  Care Management Initial Consult    General Information  Assessment completed with: Patient,    Type of CM/SW Visit: Initial Assessment    Primary Care Provider verified and updated as needed:     Readmission within the last 30 days:     Reason for Consult: discharge planning, noted per PT assessment the anticipation of pt's return home on discharge. Noted also ID consulting, pt's surgery yesterday for calf injury.   Advance Care Planning: Advance Care Planning Reviewed: no concerns identified          Communication Assessment  Patient's communication style: spoken language (English or Bilingual)    Hearing Difficulty or Deaf: yes   Wear Glasses or Blind: no    Cognitive  Cognitive/Neuro/Behavioral: WDL(little [place])  Level of Consciousness: alert  Arousal Level: opens eyes spontaneously  Orientation: oriented x 4  Mood/Behavior: flat affect, calm  Best Language: 0 - No aphasia  Speech: clear    Living Environment:   People in home: spouse, child(morgan), adult, child(morgan), dependent  8  Current living Arrangements: house      Able to return to prior arrangements: yes       Family/Social Support:  Care provided by:    Provides care for: (helps support care of grandchildren)  Marital Status:   Children, Wife          Description of Support System: Supportive, Involved         Current Resources:   Skilled Home Care Services:    Community Resources: None  Equipment currently used at home: cane, straight, grab bar, tub/shower, grab bar, toilet, raised toilet seat  Supplies currently used at home:      Employment/Financial:  Employment Status: unemployed        Financial Concerns: No concerns identified             Additional Information:   Met with pt who affirms planning for his return home on discharge. He notes that he resides with his wife in their home, daughter and son-in-law and their 4 children (ages 4-11 years old also live in residence). Pt notes that wife recently sustained an ankle fx and at this time is  using walker for mobility, so she would be limited in her physical ability to assist pt. Family able to helps with IADLs, shopping and transportation.      With discussion pt indicated that he would like to have consideration of home care services on discharge to monitor his recovery, otherwise no other discharge needs identified by pt.     Plan:     Will continue to follow progress, anticipate pt's return home on discharge, possible home care services.       .    Ruth Ann Andrew, [facility]   Inpatient Care Coordination   [facility]     [phone]

## 2021-01-11 NOTE — PROGRESS NOTES
Orthopedic Surgery  Todd S Aschoff  2021  Admit Date:  2021  POD: 1 Day Post-Op   Procedure(s):  1.  Irrigation and excisional debridement to muscle left distal medial calf chronic wounds total area measuring 9 cm x 4 cm 2.  Irrigation and excisional debridement to fascia left medial calf chronic hematoma measuring 29 x 6.7 x 4.2 cm 3.  Primary complex wound closure left medial distal calf 9 cm in length    Sleepy t/o exam  Patient resting in bed.    States his leg is painful.   Denies nausea or vomiting  Denies chest pain or shortness of breath    Vital Sign Ranges  Temperature Temp  Av.9  F (36.6  C)  Min: 96.9  F (36.1  C)  Max: 98.6  F (37  C)   Blood pressure Systolic (24hrs), Av , Min:119 , Max:164        Diastolic (24hrs), Av, Min:64, Max:91      Pulse Pulse  Av.4  Min: 69  Max: 83   Respirations Resp  Av.2  Min: 8  Max: 16   Pulse oximetry SpO2  Av.6 %  Min: 93 %  Max: 100 %       Dressing is clean, dry, and intact.  Splint in place.     Hemovac drain removed at bedside. (Total output 20ml since surgery)  Drain site expressing blood.  This is reinforced and wrapped with compressive wrap.    Minimal erythema of the surrounding skin.   Tenderness within left calf, denies right calf tenderness.   Left lower extremity is NVI.  Sensation intact bilateral lower extremities  +Dp pulse    Labs:  Recent Labs   Lab Test 01/10/21  0633 21  1246 20  1328   POTASSIUM 3.8 4.3 4.1     Recent Labs   Lab Test 21  0719 01/10/21  0633 21  1246   HGB 13.6 14.7 15.6     Recent Labs   Lab Test 21  0719 01/10/21  1007 01/10/21  0636   INR 1.36* 1.70* 1.88*     All cultures:  Recent Labs   Lab 01/10/21  0832 21  1502 21  1304   CULT Moderate growth  Escherichia coli  Susceptibility testing in progress  *  Culture in progress  Culture negative monitoring continues No growth after 2 days No growth after 2 days         1. Plan:   Continue  coumadin/heparin per medicine team for DVT prophylaxis.     IV ABx per ID:  Antibiotics also recommended given his mechanical valve and total hip arthroplasties    Mobilize with PT/OT    WBAT Left LE.     Continue current pain regiment.   Dressings: reinforce dressings prn if drainage continues from drain site.      2. Disposition   Anticipate d/c to home 2-3 days when medically cleared and progressing in PT.    Marisa Mazariegos PA-C

## 2021-01-12 ENCOUNTER — APPOINTMENT (OUTPATIENT)
Dept: PHYSICAL THERAPY | Facility: CLINIC | Age: 66
End: 2021-01-12
Payer: COMMERCIAL

## 2021-01-12 ENCOUNTER — TELEPHONE (OUTPATIENT)
Dept: INTERNAL MEDICINE | Facility: CLINIC | Age: 66
End: 2021-01-12

## 2021-01-12 LAB
ANION GAP SERPL CALCULATED.3IONS-SCNC: <1 MMOL/L (ref 3–14)
BUN SERPL-MCNC: 10 MG/DL (ref 7–30)
CALCIUM SERPL-MCNC: 9 MG/DL (ref 8.5–10.1)
CHLORIDE SERPL-SCNC: 101 MMOL/L (ref 94–109)
CO2 SERPL-SCNC: 37 MMOL/L (ref 20–32)
CREAT SERPL-MCNC: 0.97 MG/DL (ref 0.66–1.25)
GFR SERPL CREATININE-BSD FRML MDRD: 82 ML/MIN/{1.73_M2}
GLUCOSE SERPL-MCNC: 116 MG/DL (ref 70–99)
HGB BLD-MCNC: 13.8 G/DL (ref 13.3–17.7)
INR PPP: 1.47 (ref 0.86–1.14)
POTASSIUM SERPL-SCNC: 4.8 MMOL/L (ref 3.4–5.3)
SODIUM SERPL-SCNC: 137 MMOL/L (ref 133–144)

## 2021-01-12 PROCEDURE — 85018 HEMOGLOBIN: CPT | Performed by: PHYSICIAN ASSISTANT

## 2021-01-12 PROCEDURE — 120N000001 HC R&B MED SURG/OB

## 2021-01-12 PROCEDURE — 85610 PROTHROMBIN TIME: CPT | Performed by: PHYSICIAN ASSISTANT

## 2021-01-12 PROCEDURE — 97116 GAIT TRAINING THERAPY: CPT | Mod: GP

## 2021-01-12 PROCEDURE — 250N000011 HC RX IP 250 OP 636: Performed by: INTERNAL MEDICINE

## 2021-01-12 PROCEDURE — 250N000013 HC RX MED GY IP 250 OP 250 PS 637: Performed by: INTERNAL MEDICINE

## 2021-01-12 PROCEDURE — 80048 BASIC METABOLIC PNL TOTAL CA: CPT | Performed by: PHYSICIAN ASSISTANT

## 2021-01-12 PROCEDURE — 99232 SBSQ HOSP IP/OBS MODERATE 35: CPT | Performed by: INTERNAL MEDICINE

## 2021-01-12 PROCEDURE — 97530 THERAPEUTIC ACTIVITIES: CPT | Mod: GP

## 2021-01-12 PROCEDURE — 250N000013 HC RX MED GY IP 250 OP 250 PS 637: Performed by: PHYSICIAN ASSISTANT

## 2021-01-12 PROCEDURE — 36415 COLL VENOUS BLD VENIPUNCTURE: CPT | Performed by: PHYSICIAN ASSISTANT

## 2021-01-12 RX ORDER — MEMANTINE HYDROCHLORIDE 10 MG/1
TABLET ORAL
Qty: 60 TABLET | Refills: 1 | Status: ON HOLD | OUTPATIENT
Start: 2021-01-12 | End: 2021-01-22

## 2021-01-12 RX ADMIN — MEMANTINE 10 MG: 5 TABLET ORAL at 21:09

## 2021-01-12 RX ADMIN — ENOXAPARIN SODIUM 135 MG: 150 INJECTION SUBCUTANEOUS at 09:25

## 2021-01-12 RX ADMIN — ENOXAPARIN SODIUM 135 MG: 150 INJECTION SUBCUTANEOUS at 21:08

## 2021-01-12 RX ADMIN — Medication 1 MG: at 21:09

## 2021-01-12 RX ADMIN — ACETAMINOPHEN 975 MG: 325 TABLET, FILM COATED ORAL at 09:34

## 2021-01-12 RX ADMIN — SIMVASTATIN 40 MG: 40 TABLET, FILM COATED ORAL at 21:09

## 2021-01-12 RX ADMIN — PROPAFENONE HYDROCHLORIDE 150 MG: 150 TABLET, COATED ORAL at 13:31

## 2021-01-12 RX ADMIN — PROPAFENONE HYDROCHLORIDE 150 MG: 150 TABLET, COATED ORAL at 21:09

## 2021-01-12 RX ADMIN — LEVOTHYROXINE SODIUM 150 MCG: 150 TABLET ORAL at 09:26

## 2021-01-12 RX ADMIN — FOLIC ACID 5 MG: 1 TABLET ORAL at 09:25

## 2021-01-12 RX ADMIN — VENLAFAXINE HYDROCHLORIDE 300 MG: 150 CAPSULE, EXTENDED RELEASE ORAL at 09:26

## 2021-01-12 RX ADMIN — DONEPEZIL HYDROCHLORIDE 10 MG: 10 TABLET ORAL at 21:09

## 2021-01-12 RX ADMIN — MIRABEGRON 50 MG: 25 TABLET, FILM COATED, EXTENDED RELEASE ORAL at 21:09

## 2021-01-12 RX ADMIN — PREGABALIN 100 MG: 100 CAPSULE ORAL at 21:09

## 2021-01-12 RX ADMIN — DOCUSATE SODIUM 50 MG AND SENNOSIDES 8.6 MG 1 TABLET: 8.6; 5 TABLET, FILM COATED ORAL at 21:09

## 2021-01-12 RX ADMIN — POLYETHYLENE GLYCOL 3350 17 G: 17 POWDER, FOR SOLUTION ORAL at 09:27

## 2021-01-12 RX ADMIN — OXYCODONE HYDROCHLORIDE 10 MG: 5 TABLET ORAL at 09:27

## 2021-01-12 RX ADMIN — DOCUSATE SODIUM 100 MG: 100 CAPSULE, LIQUID FILLED ORAL at 21:10

## 2021-01-12 RX ADMIN — ACETAMINOPHEN 975 MG: 325 TABLET, FILM COATED ORAL at 13:30

## 2021-01-12 RX ADMIN — ACETAMINOPHEN 975 MG: 325 TABLET, FILM COATED ORAL at 21:09

## 2021-01-12 RX ADMIN — OXYCODONE HYDROCHLORIDE 10 MG: 5 TABLET ORAL at 18:17

## 2021-01-12 RX ADMIN — WARFARIN SODIUM 7.5 MG: 5 TABLET ORAL at 18:17

## 2021-01-12 RX ADMIN — PROPAFENONE HYDROCHLORIDE 150 MG: 150 TABLET, COATED ORAL at 05:13

## 2021-01-12 RX ADMIN — PREGABALIN 100 MG: 100 CAPSULE ORAL at 09:26

## 2021-01-12 RX ADMIN — LIOTHYRONINE SODIUM 25 MCG: 25 TABLET ORAL at 09:25

## 2021-01-12 RX ADMIN — MEMANTINE 10 MG: 5 TABLET ORAL at 09:26

## 2021-01-12 RX ADMIN — PREGABALIN 100 MG: 100 CAPSULE ORAL at 13:31

## 2021-01-12 RX ADMIN — OXYCODONE HYDROCHLORIDE 10 MG: 5 TABLET ORAL at 13:31

## 2021-01-12 RX ADMIN — CEFTRIAXONE 1 G: 1 INJECTION, POWDER, FOR SOLUTION INTRAMUSCULAR; INTRAVENOUS at 11:58

## 2021-01-12 ASSESSMENT — ACTIVITIES OF DAILY LIVING (ADL)
ADLS_ACUITY_SCORE: 18

## 2021-01-12 NOTE — PROGRESS NOTES
Orthopedic Surgery  Todd S Aschoff  2021  Admit Date:  2021  POD: 2 Days Post-Op   Procedure(s):  1.  Irrigation and excisional debridement to muscle left distal medial calf chronic wounds total area measuring 9 cm x 4 cm 2.  Irrigation and excisional debridement to fascia left medial calf chronic hematoma measuring 29 x 6.7 x 4.2 cm 3.  Primary complex wound closure left medial distal calf 9 cm in length    Alert and oriented to person, place, and time.  Patient resting comfortably in chair.    Continues to complain of calf pain, however, patient is in no acute distress.   Tolerating oral intake.    Denies nausea or vomiting  Denies chest pain or shortness of breath    Vital Sign Ranges  Temperature Temp  Av  F (36.7  C)  Min: 96.9  F (36.1  C)  Max: 98.8  F (37.1  C)   Blood pressure Systolic (24hrs), Av , Min:134 , Max:197        Diastolic (24hrs), Av, Min:77, Max:107      Pulse Pulse  Av.8  Min: 72  Max: 82   Respirations Resp  Av.8  Min: 16  Max: 20   Pulse oximetry SpO2  Av.8 %  Min: 90 %  Max: 98 %       Dressing is clean, dry, and intact.  Splint in place.      Tenderness within left calf, denies right calf tenderness.   Left lower extremity is NVI.  Sensation intact bilateral lower extremities  Able to flex and extend toes on left without difficulty  +Dp pulse    Labs:  Recent Labs   Lab Test 21  0710 01/10/21  0633 21  1246   POTASSIUM 4.8 3.8 4.3     Recent Labs   Lab Test 21  0710 21  0719 01/10/21  0633   HGB 13.8 13.6 14.7     Recent Labs   Lab Test 21  0710 21  0719 01/10/21  1007   INR 1.47* 1.36* 1.70*     Recent Labs   Lab Test 01/10/21  0633 21  1246 20  1328    246 196          1. Plan:              Continue coumadin/heparin per medicine team for DVT prophylaxis.                IV ABx per ID.   Antibiotics also recommended given his mechanical valve and total hip arthroplasties               Mobilize with  PT/OT               WBAT Left LE.                Continue current pain regiment.              Dressings: keep splint intact x 2 weeks.       2. Disposition              Anticipate d/c to home 1-2 days when medically cleared and progressing in PT.       Marisa Mazariegos PA-C

## 2021-01-12 NOTE — PROGRESS NOTES
[facility]  Infectious Disease Progress Note          Assessment and Plan:   IMPRESSION:   1.  A 65-year-old male with a large spontaneous left leg hematoma for several weeks, now worsening, some drainage in wound, status post major incision and drainage, not clearcut deep infection, but culture growing Escherichia coli.  No major clinical sepsis.   2.  Aortic valve replacement and thus anticoagulation.   3.  Atrial fibrillation, also reason for anticoagulation.   4.  Prior methicillin-resistant Staphylococcus aureus lumbar wound infection remotely.  No recent methicillin-resistant Staphylococcus aureus infections.      RECOMMENDATIONS:   1.  Continueceftriaxone, e coli sens, enterococcus unlikely sig.   2.  Likely oral antibiotics will be acceptable here when clinically improved, and follow the full culture information and clinical progress and adjust.         Interval History:   no new complaints and doing well; no cp, sob, n/v/d, or abd pain. cx as above no fever               Medications:       acetaminophen  975 mg Oral TID     cefTRIAXone  1 g Intravenous Q24H     docusate sodium  100 mg Oral BID     donepezil  10 mg Oral At Bedtime     enoxaparin ANTICOAGULANT  1 mg/kg Subcutaneous Q12H     folic acid  5 mg Oral Daily     levothyroxine  150 mcg Oral Daily     liothyronine  25 mcg Oral Daily     memantine  10 mg Oral BID     mirabegron  50 mg Oral At Bedtime     polyethylene glycol  17 g Oral Daily     pregabalin  100 mg Oral TID     propafenone  150 mg Oral Q8H     senna-docusate  1 tablet Oral BID     simvastatin  40 mg Oral At Bedtime     sodium chloride (PF)  3 mL Intracatheter Q8H     venlafaxine  300 mg Oral Daily     warfarin ANTICOAGULANT  7.5 mg Oral ONCE at 18:00                  Physical Exam:   Blood pressure 120/78, pulse 89, temperature 98.7  F (37.1  C), temperature source Temporal, resp. rate 18, height 1.829 m (6'), weight 140.6 kg (309 lb 14.4 oz), SpO2 92 %.  Wt  Readings from Last 2 Encounters:   01/09/21 140.6 kg (309 lb 14.4 oz)   12/11/20 141.9 kg (312 lb 12.8 oz)     Vital Signs with Ranges  Temp:  [96.9  F (36.1  C)-98.8  F (37.1  C)] 98.7  F (37.1  C)  Pulse:  [72-89] 89  Resp:  [16-20] 18  BP: (120-197)/() 120/78  SpO2:  [90 %-98 %] 92 %    Constitutional: Awake, alert, cooperative, no apparent distress   Lungs: Clear to auscultation bilaterally, no crackles or wheezing   Cardiovascular: Regular rate and rhythm, normal S1 and S2, and no murmur noted   Abdomen: Normal bowel sounds, soft, non-distended, non-tender   Skin: No rashes, no cyanosis, no edema L leg wrapped   Other:           Data:   All microbiology laboratory data reviewed.  Recent Labs   Lab Test 01/12/21  0710 01/11/21  0719 01/10/21  0633 01/09/21  1246 12/08/20  1328   WBC  --   --  6.0 6.0 6.0   HGB 13.8 13.6 14.7 15.6 10.9*   HCT  --   --  45.7 47.3 32.7*   MCV  --   --  99 98 97   PLT  --   --  214 246 196     Recent Labs   Lab Test 01/12/21  0710 01/10/21  0633 01/09/21  1246   CR 0.97 0.93 1.02     Recent Labs   Lab Test 04/18/13  0741   SED 30*     Recent Labs   Lab Test 01/10/21  0832 01/09/21  1502 01/09/21  1304 02/09/18  1330 11/01/17  2056 03/06/17  1456 03/06/17  1401 01/18/17  0547 05/23/13  1328   CULT Moderate growth  Escherichia coli  Preliminary identification and susceptibility testing performed on mixed culture.  Repeat   identification and susceptibility testing in progress, await final report.  *  Light growth  Enterococcus faecalis  *  Culture in progress  Culture negative monitoring continues No growth after 3 days No growth after 3 days No beta hemolytic Streptococcus Group A isolated Moderate growth  Normal urogenital esther   No growth No growth >100,000 colonies/mL Escherichia coli* Light growth Peptostreptococcus species Susceptibility testing not routinely done  Light growth Methicillin resistant Staphylococcus aureus (MRSA)

## 2021-01-12 NOTE — CONSULTS
Consult Date:  01/11/2021      INFECTIOUS DISEASE CONSULTATION      REFERRING PHYSICIAN:  Dr. Philippe.      IMPRESSION:   1.  A 65-year-old male with a large spontaneous left thigh hematoma for several weeks, now worsening, some drainage in wound, status post major incision and drainage, not clearcut deep infection, but culture growing Escherichia coli.  No major clinical sepsis.   2.  Aortic valve replacement and thus anticoagulation.   3.  Atrial fibrillation, also reason for anticoagulation.   4.  Prior methicillin-resistant Staphylococcus aureus lumbar wound infection remotely.  No recent methicillin-resistant Staphylococcus aureus infections.      RECOMMENDATIONS:   1.  Agree with ceftriaxone, await full culture information and adjust.   2.  Likely oral antibiotics will be acceptable here when clinically improved, and follow the full culture information and clinical progress and adjust.      HISTORY:  This 65-year-old male is seen in consultation due to infected left leg hematoma.  The patient is on long-term anticoagulation due to atrial fibrillation and aortic valve replacement.  He had 1 prior MRSA lumbar wound infection, but otherwise has not had much in the way of infection problems.  He has not had that much in the way of major bleeding problems either, but developed a hematoma in December, and it has not really gotten better over time.  Some drainage and bleeding occurred currently.  Not really having fevers, chills or sweats.  At admission, it was not obvious there was infection, but it is a very large hematoma, bleeding, and worried about infection, so had an I and D done.  That culture of the wound area is growing E. coli with full information to follow.      PAST MEDICAL HISTORY:  Prior MRSA wound infection in 2013, history of atrial fibrillation, history of aortic valve replacement.      ALLERGIES:  NO ANTIBIOTIC ALLERGIES.      MEDICATIONS:  As listed.      SOCIAL AND FAMILY HISTORY:  The known MRSA  as above.  No other major recent travels or exposures.  No one else he has been around has been ill.  COVID-19 negative and no symptoms.      REVIEW OF SYSTEMS:  Largely as listed above, unremarkable otherwise, no major fevers, chills or sweats, a fair amount of pain in the left thigh area.      PHYSICAL EXAMINATION:   GENERAL:  The patient appears his stated age.  He looked comfortable and well.   VITAL SIGNS:  Currently normal, including being afebrile.   HEENT:  No thrush or intraoral lesions.  Pupils reactive.   NECK:  Supple and nontender.   HEART:  Murmur compatible with the known aortic valve disease.   LUNGS:  Clear.   ABDOMEN:  Nontender.   EXTREMITIES:  The thigh area is wrapped.  No other obvious infection around the site.      LABORATORY DATA:  Creatinine within normal limits.  White count normal, hemoglobin 14 despite the large hematoma.  The wound culture is so far growing E. coli, full information to follow.  Blood cultures have been negative.      Thank you very much for this consultation.  I will follow the patient with you.         GETACHEW KING MD             D: 2021   T: 2021   MT: [id number]      Name:     ASCHOFF, TODD   MRN:      [id number]        Account:       [id number]   :      1955           Consult Date:  2021      Document: [id number]       cc: Isreal Philippe MD

## 2021-01-12 NOTE — PLAN OF CARE
Pt A/O x 4, VSS, afebrile, 90% RA. LS clear, denies SOB. Telemetry in place SR 60-70's . Regular diet, denied nausea early in shift, had one projectile emesis at 05:00am, declined antiemetic reporting no nausea post emesis. Transfers with Ax1, gait belt, and walker, WBAT. Dressing CDI, CMS intact. L leg elevated on blue wedge pillow. 1 incontinent episode, voiding in adequate amounts. Denies pain. Plans d/c to home in 1-2 days.

## 2021-01-12 NOTE — TELEPHONE ENCOUNTER
Letter completed. See addition to letters. This is an urgent appeal. Please forward to the necessary party.

## 2021-01-12 NOTE — PROGRESS NOTES
I saw patient regarding his post op shoe.  I fabricated straps that would allow the post op shoe to remain on.  I advised precautions while using the shoe.  I do not have available a shoe that is wide enough for the cast.  I advised nurse and patient to make sure feels safe during gait and to call if problems.  Oscar VELOZ.

## 2021-01-12 NOTE — PLAN OF CARE
Pt A&O x4. [place]. VSS. A1 walker/gb. Voiding. Dressing to L leg CDI. CMS intact. Oxy for pain. Heparin gtt stopped and started on lovenox. Plans to discharge home in 1-2 days.

## 2021-01-12 NOTE — PLAN OF CARE
Afebrile.  IV rocephin.  Remote tele.  Coumadin bridged with lovenox until therapeutic goal INR 2.5-3.5.  Pain managed with oxycodone + elevation via blue wedge pillow, declined cold.  LS clear bilaterally, RA, encouraged CDB/IS hourly.  Drsg CDI.  Voiding sm. amt dark urine, PVR 16ml only, encouraged increased fluids.  A1 belt walker + post-op shoe, L leg WBAT, ambulated hallway.  Plan is DC back to  home.

## 2021-01-12 NOTE — TELEPHONE ENCOUNTER
Prior Authorization Retail Medication Request    Medication/Dose: venlafaxine (EFFEXOR-XR) 150 MG 24 hr capsule  ICD code (if different than what is on RX):     Previously Tried and Failed:     Rationale:  Step Therapy Required/Quantity Limits Apply/Maximum Daily Dose of 1.0 capsule    Insurance Name:  [facility] [employer] [facility]   Insurance ID:   [id number]  Bin  [id number]  Count includes the [facility]  Plan Phone:  [phone]      Pharmacy Information (if different than what is on RX)  Name:     Phone:

## 2021-01-12 NOTE — PROGRESS NOTES
[facility]    Medicine Progress Note - Hospitalist Service       Date of Admission:  1/9/2021  Date of Service: 01/12/2021    Assessment & Plan     This is a 65-year-old gentleman with a past medical history significant for aortic dissection s/p mechanical aortic valve plus graft in 1992, atrial fibrillation, on chronic anticoagulation with warfarin, chronic pain, depression, who presented to the hospital with concerns for leg wound.    Patient had a fall about a month ago and he developed a hematoma in his left lower extremity.  This had persisted and he had developed a draining wound.  Given these concerns he was admitted to the hospital for further management.    #Left lower extremity hematoma.  S/p fall in December 2020.  Now with a chronic draining wound.  -Greatly appreciate orthopedics consultation.  Patient received vitamin K to bring his INR below 2 and underwent surgery 1/10 for I&D  -Currently patient is afebrile, normal white count.  Follow wound cultures  -Received prophylactic antibiotic post-op. Wound cultures today turned positive with E.Coli, will start IV ceftriaxone, ID consult, Blood cultures negative so far  -Defer postoperative cares including wound management, drain management, therapies per orthopedics expertise    #Chronic anticoagulation with warfarin for mechanical aortic valve and atrial fibrillation.  -Patient received vitamin K prior to his surgery.  INR down to 1.3, started back on warfarin   -- Resume him on his warfarin although it might take a few days for the INR to come to therapeutic range.  Goal INR is 2.5 - 3.5 per pt   -- Was on IV heparin for bridging, now changed to lovenox ,. Stop lovenox once INR > 2.5.   -- Patient has used lovenox for bridging in the past and familiar with its use     #Atrial fibrillation.  Resume the patient on his propafenone    #Chronic pain.  Patient is on Lyrica.     #Hypothyroidism.  Patient is on levothyroxine and  liothyronine.    #Depression.  He is on Effexor.      Diet: Advance Diet as Tolerated: Regular Diet Adult    DVT Prophylaxis: As above   Zimmer Catheter: not present  Code Status: Full Code      Disposition Plan   Expected discharge: 1-2 days, pending post-op recovery and once plan for treatment of infection is estbalished  Entered: Isreal Philippe MD 01/12/2021, 1:26 PM       The patient's care was discussed with the Bedside Nurse and Patient.     Isreal Philippe MD  Hospitalist Service  [facility]    ______________________________________________________________________    Interval History   Chart reviewed and patient seen. Case discussed with nursing staff.     Patient is awake and alert,  Has occasional flares of pain but mostly controlled, Denies any chest pain, shortness of breath. No reports of abdominal pain or vomiting.  No new complaints voiced otherwise.       Data reviewed today: I reviewed all medications, new labs and imaging results over the last 24 hours. I personally reviewed    Physical Exam   Vital Signs: Temp: 98.7  F (37.1  C) Temp src: Temporal BP: 120/78 Pulse: 89   Resp: 18 SpO2: 92 % O2 Device: None (Room air)    Weight: 309 lbs 14.4 oz    GENERAL:  Awake, alert , No acute distress.  PSYCH: appropriate affect, no acute agitation   HEENT:  Neck is Supple, trachea is midline, EOMI, conjunctiva clear  CARDIOVASCULAR: Regular rate and rhythm, Normal S1, S2, mechanical valve click   PULMONARY:  Clear bilaterally. Good air entry on both sides  GI: Abdomen is soft, non tender, non-distended, bowel sounds present. No rebound or guarding   SKIN:  No cyanosis or clubbing, no obvious exanthems on exposed areas   MSK: dressing intact on left leg   Neuro: Awake and oriented x 3. Grossly non-focal     Data   Recent Labs   Lab 01/12/21  0710 01/11/21  0719 01/10/21  1007 01/10/21  0633 01/10/21  0633 01/09/21  1246 01/09/21  1246   WBC  --   --   --   --  6.0  --  6.0   HGB 13.8  13.6  --   --  14.7  --  15.6   MCV  --   --   --   --  99  --  98   PLT  --   --   --   --  214  --  246   INR 1.47* 1.36* 1.70*   < >  --    < > 3.26*     --   --   --  138  --  140   POTASSIUM 4.8  --   --   --  3.8  --  4.3   CHLORIDE 101  --   --   --  107  --  105   CO2 37*  --   --   --  30  --  34*   BUN 10  --   --   --  15  --  13   CR 0.97  --   --   --  0.93  --  1.02   ANIONGAP <1*  --   --   --  1*  --  1*   CATERINA 9.0  --   --   --  8.8  --  8.7   * 100*  --   --  85  --  146*   ALBUMIN  --   --   --   --   --   --  3.4   PROTTOTAL  --   --   --   --   --   --  6.9   BILITOTAL  --   --   --   --   --   --  1.0   ALKPHOS  --   --   --   --   --   --  96   ALT  --   --   --   --   --   --  23   AST  --   --   --   --   --   --  28    < > = values in this interval not displayed.       No results found for this or any previous visit (from the past 24 hour(s)).  Medications     lactated ringers Stopped (01/11/21 0827)     - MEDICATION INSTRUCTIONS -       Warfarin Therapy Reminder         acetaminophen  975 mg Oral TID     cefTRIAXone  1 g Intravenous Q24H     docusate sodium  100 mg Oral BID     donepezil  10 mg Oral At Bedtime     enoxaparin ANTICOAGULANT  1 mg/kg Subcutaneous Q12H     folic acid  5 mg Oral Daily     levothyroxine  150 mcg Oral Daily     liothyronine  25 mcg Oral Daily     memantine  10 mg Oral BID     mirabegron  50 mg Oral At Bedtime     polyethylene glycol  17 g Oral Daily     pregabalin  100 mg Oral TID     propafenone  150 mg Oral Q8H     senna-docusate  1 tablet Oral BID     simvastatin  40 mg Oral At Bedtime     sodium chloride (PF)  3 mL Intracatheter Q8H     venlafaxine  300 mg Oral Daily     warfarin ANTICOAGULANT  7.5 mg Oral ONCE at 18:00

## 2021-01-13 VITALS
OXYGEN SATURATION: 94 % | SYSTOLIC BLOOD PRESSURE: 165 MMHG | TEMPERATURE: 98.2 F | DIASTOLIC BLOOD PRESSURE: 90 MMHG | RESPIRATION RATE: 16 BRPM | WEIGHT: 309.9 LBS | HEART RATE: 83 BPM | HEIGHT: 72 IN | BODY MASS INDEX: 41.97 KG/M2

## 2021-01-13 LAB
BACTERIA SPEC CULT: ABNORMAL
ERYTHROCYTE [DISTWIDTH] IN BLOOD BY AUTOMATED COUNT: 14.1 % (ref 10–15)
HCT VFR BLD AUTO: 39.4 % (ref 40–53)
HGB BLD-MCNC: 12.3 G/DL (ref 13.3–17.7)
INR PPP: 2.01 (ref 0.86–1.14)
Lab: ABNORMAL
MCH RBC QN AUTO: 31.8 PG (ref 26.5–33)
MCHC RBC AUTO-ENTMCNC: 31.2 G/DL (ref 31.5–36.5)
MCV RBC AUTO: 102 FL (ref 78–100)
PLATELET # BLD AUTO: 190 10E9/L (ref 150–450)
RBC # BLD AUTO: 3.87 10E12/L (ref 4.4–5.9)
SPECIMEN SOURCE: ABNORMAL
WBC # BLD AUTO: 6.8 10E9/L (ref 4–11)

## 2021-01-13 PROCEDURE — 85027 COMPLETE CBC AUTOMATED: CPT | Performed by: INTERNAL MEDICINE

## 2021-01-13 PROCEDURE — 250N000011 HC RX IP 250 OP 636: Performed by: INTERNAL MEDICINE

## 2021-01-13 PROCEDURE — 85610 PROTHROMBIN TIME: CPT | Performed by: INTERNAL MEDICINE

## 2021-01-13 PROCEDURE — 250N000013 HC RX MED GY IP 250 OP 250 PS 637: Performed by: PHYSICIAN ASSISTANT

## 2021-01-13 PROCEDURE — 36415 COLL VENOUS BLD VENIPUNCTURE: CPT | Performed by: INTERNAL MEDICINE

## 2021-01-13 PROCEDURE — 250N000013 HC RX MED GY IP 250 OP 250 PS 637: Performed by: INTERNAL MEDICINE

## 2021-01-13 PROCEDURE — 99239 HOSP IP/OBS DSCHRG MGMT >30: CPT | Performed by: INTERNAL MEDICINE

## 2021-01-13 RX ORDER — CEFTRIAXONE 2 G/1
2 INJECTION, POWDER, FOR SOLUTION INTRAMUSCULAR; INTRAVENOUS EVERY 24 HOURS
Status: DISCONTINUED | OUTPATIENT
Start: 2021-01-13 | End: 2021-01-13 | Stop reason: HOSPADM

## 2021-01-13 RX ORDER — CEFUROXIME AXETIL 500 MG/1
500 TABLET ORAL 2 TIMES DAILY
Qty: 14 TABLET | Refills: 0 | Status: SHIPPED | OUTPATIENT
Start: 2021-01-13 | End: 2021-01-20

## 2021-01-13 RX ORDER — WARFARIN SODIUM 5 MG/1
5 TABLET ORAL
Status: DISCONTINUED | OUTPATIENT
Start: 2021-01-13 | End: 2021-01-13 | Stop reason: HOSPADM

## 2021-01-13 RX ADMIN — ACETAMINOPHEN 975 MG: 325 TABLET, FILM COATED ORAL at 08:22

## 2021-01-13 RX ADMIN — OXYCODONE HYDROCHLORIDE 5 MG: 5 TABLET ORAL at 14:37

## 2021-01-13 RX ADMIN — DOCUSATE SODIUM 50 MG AND SENNOSIDES 8.6 MG 1 TABLET: 8.6; 5 TABLET, FILM COATED ORAL at 08:23

## 2021-01-13 RX ADMIN — PROPAFENONE HYDROCHLORIDE 150 MG: 150 TABLET, COATED ORAL at 14:37

## 2021-01-13 RX ADMIN — OXYCODONE HYDROCHLORIDE 10 MG: 5 TABLET ORAL at 08:22

## 2021-01-13 RX ADMIN — CEFTRIAXONE 2 G: 2 INJECTION, POWDER, FOR SOLUTION INTRAMUSCULAR; INTRAVENOUS at 11:13

## 2021-01-13 RX ADMIN — PREGABALIN 100 MG: 100 CAPSULE ORAL at 08:22

## 2021-01-13 RX ADMIN — PREGABALIN 100 MG: 100 CAPSULE ORAL at 14:37

## 2021-01-13 RX ADMIN — LIOTHYRONINE SODIUM 25 MCG: 25 TABLET ORAL at 08:22

## 2021-01-13 RX ADMIN — FOLIC ACID 5 MG: 1 TABLET ORAL at 08:22

## 2021-01-13 RX ADMIN — ENOXAPARIN SODIUM 135 MG: 150 INJECTION SUBCUTANEOUS at 08:21

## 2021-01-13 RX ADMIN — MEMANTINE 10 MG: 5 TABLET ORAL at 08:22

## 2021-01-13 RX ADMIN — POLYETHYLENE GLYCOL 3350 17 G: 17 POWDER, FOR SOLUTION ORAL at 08:21

## 2021-01-13 RX ADMIN — VENLAFAXINE HYDROCHLORIDE 300 MG: 150 CAPSULE, EXTENDED RELEASE ORAL at 08:22

## 2021-01-13 RX ADMIN — MAGNESIUM HYDROXIDE 30 ML: 400 SUSPENSION ORAL at 08:22

## 2021-01-13 RX ADMIN — ACETAMINOPHEN 975 MG: 325 TABLET, FILM COATED ORAL at 14:37

## 2021-01-13 RX ADMIN — DOCUSATE SODIUM 100 MG: 100 CAPSULE, LIQUID FILLED ORAL at 08:22

## 2021-01-13 RX ADMIN — LEVOTHYROXINE SODIUM 150 MCG: 150 TABLET ORAL at 08:22

## 2021-01-13 RX ADMIN — PROPAFENONE HYDROCHLORIDE 150 MG: 150 TABLET, COATED ORAL at 06:32

## 2021-01-13 ASSESSMENT — ACTIVITIES OF DAILY LIVING (ADL)
ADLS_ACUITY_SCORE: 18
ADLS_ACUITY_SCORE: 21
ADLS_ACUITY_SCORE: 18
ADLS_ACUITY_SCORE: 21
ADLS_ACUITY_SCORE: 21

## 2021-01-13 NOTE — PLAN OF CARE
Day RN  VS monitored, up w/SBA and ww and post-op shoe, rossy C/D/I, CMS+, voiding, remote tele, Contact ISO, Oxycodone/Tylenol/Lyrica for pain, oliguria, urine dark in color, encouraged fluids, IV Rocephin cont, plan is discharge home this eder, will cont to monitor. Went over discharge ppwk with patient and medications given to pt for home (Ceftin/Oxycodone).

## 2021-01-13 NOTE — PROGRESS NOTES
"Orthopedic Surgery Progress Note  Todd S Aschoff  2021  Admit Date:  2021  POD 3 Days Post-Op  S/P Procedure(s):  1.  Irrigation and excisional debridement to muscle left distal medial calf chronic wounds total area measuring 9 cm x 4 cm 2.  Irrigation and excisional debridement to fascia left medial calf chronic hematoma measuring 29 x 6.7 x 4.2 cm 3.  Primary complex wound closure left medial distal calf 9 cm in length    Subjective: No acute events overnight. Patient states his pain \"is barely there\", was able to sleep well last night. Patient denies chest pain, shortness of breath, new numbness/tingling, or new weakness. He has no new concerns this morning.     Objective:    Vital Sign Ranges  Temperature Temp  Av.1  F (36.7  C)  Min: 97  F (36.1  C)  Max: 98.7  F (37.1  C)   Blood pressure Systolic (24hrs), Av , Min:120 , Max:197        Diastolic (24hrs), Av, Min:78, Max:107      Pulse Pulse  Av  Min: 78  Max: 90   Respirations Resp  Av.3  Min: 12  Max: 20   Pulse oximetry SpO2  Av.4 %  Min: 91 %  Max: 98 %     Labs:  Recent Labs   Lab Test 21  0710 01/10/21  0633 21  1246   POTASSIUM 4.8 3.8 4.3     Recent Labs   Lab Test 21  0710 21  0719 01/10/21  0633   HGB 13.8 13.6 14.7     Recent Labs   Lab Test 21  0710 21  0719 01/10/21  1007   INR 1.47* 1.36* 1.70*     Recent Labs   Lab Test 01/10/21  0633 21  1246 20  1328    246 196       Exam:    Gen: No distress, alert, oriented  Resp: Non labored respirations  CV: RRR, extremities warm  MSK:   LLE:   - Surgical dressing clean, dry, and intact with no shadowing or strikethrough  - Sensation intact to light touch distally with brisk capillary refill. Able to wiggle all toes.     A/P: Todd S Aschoff is a 65 year old male with left calf infected hematoma POD#3 s/p I&D LLE with Dr. Kemp - doing well from orthopedic perspective.     -Weight bearing status: WBAT LLE with " post-op shoe in place.   -Antibiotics: Per ID. Appreciate recs.  -Pain control with PO pain medication for breakthrough  -Bowel regimen  -DVT PPx: PTA Warfarin and mechanical  -PT/OT  -Dressings: keep clean, dry, intact until clinic follow-up with orthopedics.   -Follow up: 2 weeks with Carolina High PA-C   -Disposition: Pending final antibiotic plan per ID. OK to discharge at any time per ortho. Ortho discharge orders have been placed.     Carolina High PA-C  Orthopedic Trauma and Arthroplasty  [facility]

## 2021-01-13 NOTE — DISCHARGE SUMMARY
[facility]  Discharge Summary  Name: Todd S Aschoff    MRN: [id number]  YOB: 1955    Age: 65 year old  Date of Discharge:  1/13/2021  Date of Admission: 1/9/2021  Primary Care Provider: Obdulio Ingram  Discharge Physician:  Gold Shay MD  Discharging Service:  Hospitalist      Discharge Diagnoses:  Left lower extremity infected hematoma  Left lower leg cellulitis  S/p mechanical aortic valve replacement  A. fib  Chronic pain syndrome  Hypothyroidism  MDD     Hospital Course:  Summary of stay: This is a 65-year-old gentleman with a past medical history significant for aortic dissection s/p mechanical aortic valve plus graft in 1992, atrial fibrillation, on chronic anticoagulation with warfarin, chronic pain, depression, who presented to the hospital with concerns for a leg wound with drainage.  Patient had a fall about a month ago and he developed a hematoma in his left lower extremity.  This had persisted and he had developed a draining wound.  Given these concerns he was admitted to the hospital for further management.  He did not have fevers or leukocytosis here.  His INR was reversed with vitamin K and warfarin initially held.  He was started on IV Ancef and orthopedics was consulted for I&D which he underwent on 1/10.  Tissue cultures growing E. coli, second GNR species, Enterococcus faecalis.  ID consulted and feel E. coli the likely pathogen.  Clinically improved on IV ceftriaxone.  Passed PT and OT and completed stairs.  Pain well controlled.  Discharge home with follow-up in orthopedics clinic in 2 weeks.  Discharged on 1 week of oral cefuroxime.  Discharging on therapeutic Lovenox (has supply at home) until INR therapeutic, recommend repeat INR check in 2 days at his anticoagulation clinic.     Left lower extremity infected hematoma, cellulitis.  S/p fall in December 2020.  Now with a chronic draining wound.  -Greatly appreciate orthopedics consultation.  Patient received  vitamin K to bring his INR below 2 and underwent surgery 1/10 for I&D  -Currently patient is afebrile, normal white count.  -Received prophylactic antibiotic post-op. Wound cultures growing E. coli and one other GNR species along with Enterococcus faecalis.    -ID consulted, clinically improved on IV ceftriaxone.  Suspect the pathogen is E. coli.  Recommend discharge on cefuroxime 500 mg twice daily for additional 7 days  -Blood cultures NGTD  -Blood cultures negative so far  -Did well with PT and OT and completed stairs while here  -Follow-up in orthopedics clinic in 2 weeks     Chronic anticoagulation with warfarin for mechanical aortic valve and atrial fibrillation.  Patient received vitamin K prior to his surgery.  INR down to 1.3, started back on warfarin.  INR up to 2.0 by day of discharge.  Prescribed cefuroxime so this should not interact significantly with warfarin.  Goal INR 2.5-3.5.  The patient has Lovenox at home, recommend continuing twice daily dosing 1 mg/kg until INR therapeutic at 2.5.  Recommend he have his INR checked in 2 days at his anticoagulation clinic.      Atrial fibrillation.  Resumed the patient on his propafenone.  anticoagulation plan as above.     Chronic pain.  Patient is on Lyrica.      Hypothyroidism.  Patient is on levothyroxine and liothyronine.     Depression.  He is on Effexor that is resumed      Discharge Disposition:  Discharged to home     Allergies:  Allergies   Allergen Reactions     Gabapentin      Severe behavioral disturbances        Discharge Medications:   Current Discharge Medication List      START taking these medications    Details   acetaminophen (TYLENOL) 500 MG tablet Take 2 tablets (1,000 mg) by mouth 3 times daily    Associated Diagnoses: Wound of left lower extremity, initial encounter      cefuroxime (CEFTIN) 500 MG tablet Take 1 tablet (500 mg) by mouth 2 times daily for 7 days  Qty: 14 tablet, Refills: 0    Associated Diagnoses: Left leg cellulitis       enoxaparin ANTICOAGULANT (LOVENOX ANTICOAGULANT) 150 MG/ML syringe Inject 0.9 mLs (135 mg) Subcutaneous every 12 hours until INR > 2.5    Associated Diagnoses: Aortic valve prosthesis present      oxyCODONE (ROXICODONE) 5 MG tablet Take 1 tablet (5 mg) by mouth every 4 hours as needed for breakthrough pain  Qty: 15 tablet, Refills: 0    Associated Diagnoses: Wound of left lower extremity, initial encounter         CONTINUE these medications which have NOT CHANGED    Details   azelastine (ASTELIN) 0.1 % nasal spray USE 2 SPRAYS IN NOSTRIL 2 TIMES DAILY AS NEEDED.  Qty: 30 mL, Refills: 11    Associated Diagnoses: Rash      cetirizine (ZYRTEC) 10 MG tablet TAKE ONE TABLET BY MOUTH ONCE DAILY AS NEEDED  Qty: 90 tablet, Refills: 3    Associated Diagnoses: Chronic rhinitis      cyclobenzaprine (FLEXERIL) 10 MG tablet TAKE ONE TABLET BY MOUTH THREE TIMES DAILY AS NEEDED FOR MUSCLE SPASM.  Qty: 270 tablet, Refills: 0    Associated Diagnoses: Muscle spasm      diazepam (VALIUM) 2 MG tablet Take 1 tablet (2 mg) by mouth every 12 hours as needed for pain (try not to use over one daily)  Qty: 30 tablet, Refills: 1    Associated Diagnoses: Chronic bilateral thoracic back pain      donepezil (ARICEPT) 10 MG tablet Take 2 tablets (20 mg) by mouth At Bedtime Stop if diarrhea or nausea develop  Qty: 180 tablet, Refills: 1    Associated Diagnoses: Memory difficulties      folic acid (FOLVITE) 1 MG tablet Take 5 tablets (5 mg) by mouth daily  Qty: 150 tablet, Refills: 3    Associated Diagnoses: Major depressive disorder, recurrent episode, in full remission (H)      guanFACINE (TENEX) 1 MG tablet Take 1 tablet (1 mg) by mouth At Bedtime  Qty: 90 tablet, Refills: 0    Associated Diagnoses: Major depressive disorder, recurrent episode, moderate (H)      levothyroxine (SYNTHROID/LEVOTHROID) 150 MCG tablet Take 1 tablet (150 mcg) by mouth daily  Qty: 90 tablet, Refills: 3    Associated Diagnoses: Acquired hypothyroidism       liothyronine (CYTOMEL) 25 MCG tablet Take 1 tablet (25 mcg) by mouth daily  Qty: 30 tablet, Refills: 3    Associated Diagnoses: Major depressive disorder, recurrent episode, in full remission (H)      melatonin 3 MG tablet Take 1 tablet (3 mg) by mouth nightly as needed for sleep  Qty: 90 tablet, Refills: 1    Associated Diagnoses: Major depressive disorder, recurrent episode, in full remission (H)      !! memantine (NAMENDA) 10 MG tablet Take 10 mg by mouth 2 times daily      mirabegron (MYRBETRIQ) 50 MG 24 hr tablet Take 1 tablet (50 mg) by mouth daily  Qty: 90 tablet, Refills: 3    Associated Diagnoses: Overactive bladder      pregabalin (LYRICA) 100 MG capsule Take 1 capsule (100 mg) by mouth 3 times daily Do not take if sleepy/sedated.  Qty: 270 capsule, Refills: 0    Associated Diagnoses: Chronic bilateral low back pain without sciatica      propafenone (RYTHMOL) 150 MG TABS tablet Take 1 tablet (150 mg) by mouth every 8 hours  Qty: 270 tablet, Refills: 0    Associated Diagnoses: Chronic atrial fibrillation (H)      senna-docusate (SENOKOT-S;PERICOLACE) 8.6-50 MG per tablet Take 1 tablet by mouth 2 times daily as needed for constipation  Qty: 60 tablet, Refills: 11    Associated Diagnoses: Constipation, unspecified constipation type      simvastatin (ZOCOR) 40 MG tablet Take 1 tablet (40 mg) by mouth At Bedtime. Need to update cholesterol level before additional refills.  Qty: 90 tablet, Refills: 0    Associated Diagnoses: Hyperlipidemia LDL goal <130      triamcinolone (KENALOG) 0.1 % external cream Apply topically 2 times daily as needed (rash/itching)  Qty: 30 g, Refills: 1    Associated Diagnoses: Chronic rhinitis      venlafaxine (EFFEXOR-XR) 150 MG 24 hr capsule Take 2 capsules (300 mg) by mouth daily  Qty: 180 capsule, Refills: 3    Associated Diagnoses: Major depressive disorder, recurrent episode, moderate (H)      !! warfarin ANTICOAGULANT (COUMADIN) 5 MG tablet Take 5 mg by mouth daily Takes 5 mg  every day except Sunday. On Sundays, pt takes 7.5 mg.      !! warfarin ANTICOAGULANT (COUMADIN) 5 MG tablet Take 7.5 mg by mouth once a week Takes 7.5 mg every Sunday. Pt takes 5 mg all other days. Of note, patient took 7.5 mg 1/7 (Thursday) per provider request.      !! memantine (NAMENDA) 10 MG tablet One daily for one week then one twice daily  Qty: 60 tablet, Refills: 1    Associated Diagnoses: Mild cognitive impairment       !! - Potential duplicate medications found. Please discuss with provider.      STOP taking these medications       MAPAP ARTHRITIS PAIN 650 MG CR tablet Comments:   Reason for Stopping:                Condition on Discharge:  Discharge condition: Stable   Discharge vitals: Blood pressure (!) 165/90, pulse 83, temperature 98.2  F (36.8  C), temperature source Temporal, resp. rate 16, height 1.829 m (6'), weight 140.6 kg (309 lb 14.4 oz), SpO2 94 %.   Code status on discharge: Full Code     History of Illness:  See detailed admission note for full details.    Physical Exam:  Blood pressure (!) 165/90, pulse 83, temperature 98.2  F (36.8  C), temperature source Temporal, resp. rate 16, height 1.829 m (6'), weight 140.6 kg (309 lb 14.4 oz), SpO2 94 %.  Wt Readings from Last 1 Encounters:   01/09/21 140.6 kg (309 lb 14.4 oz)     Constitutional: Awake, NAD   Eyes: sclera white   HEENT:  MMM  Respiratory: no respiratory distress, lungs cta bilaterally, no crackles or wheeze  Cardiovascular: RRR. S2 click, no murmur  GI: non-tender, not distended, bowel sounds present  Skin: no rash  Musculoskeletal/extremities: left leg wrapped, no edema  Neurologic: A&O, speech clear, gross toes and light touch sensation intact  Psychiatric: calm, cooperative, normal affect    Procedures other than Imaging:  I&D infected left leg hematoma     Imaging:  Results for orders placed or performed during the hospital encounter of 01/09/21   CTA Lower Extremity Left with Contrast    Narrative    CTA LOWER EXTREMITY LEFT  WITH CONTRAST   1/9/2021 2:29 PM     HISTORY: Purple foot with bleeding wounds on medial calf, wooden  feeling: question limb ischemia vs possible infection. History of fall  on coumadin.     TECHNIQUE:  CTA left lower extremity with runoff to the toes after the  administration of 120mL Isovue-370 IV. Radiation dose for this scan  was reduced using automated exposure control, adjustment of the mA  and/or kV according to patient size, or iterative reconstruction  technique. 3D images were created at an independent workstation under  concurrent supervision at the request of the ordering provider.    COMPARISON: None.    FINDINGS:   VASCULATURE: Visualized portion of the left common iliac artery is  patent. The left internal and external iliac arteries are patent.  Common femoral and profunda femoral arteries are patent. The  superficial femoral and popliteal artery are patent. Atherosclerotic  disease is noted throughout the tibial vessels, with three-vessel  runoff. On the medial aspect of the left calf there is a large  hyperdense fluid collection measuring 29 x 6.7 x 4.2 cm.    MUSCULOSKELETAL/SOFT TISSUES: At least 2 skin defects are noted along  the medial aspect of the left distal calf/ankle for example (series 5,  image 303 and series 5, image 313), consistent with history of  bleeding medial calf wound. No suspicious bony lesions. Postoperative  changes of left hip arthroplasty with a edy in the left femur.      Impression    IMPRESSION:  1. Patent arteries throughout the left lower extremity. Three-vessel  runoff.  2. Hyperdense fluid collection in the medial left calf measuring 29 x  6.7 x 4.2 cm, given patient's history of recent fall and being  supratherapeutic on Coumadin with bleeding wounds this likely  represents a hematoma. Given that patient has a normal white blood  cell count abscess would be less likely.  3. Multiple skin defects along the medial aspect of the left distal  calf/ankle, consistent  with patient's history of medial calf wounds    Findings were discussed with Dr. Mayo on 1/9/2021.    RAI CARRILLO, DO     *Note: Due to a large number of results and/or encounters for the requested time period, some results have not been displayed. A complete set of results can be found in Results Review.        Consultations:  Consultation during this admission received from infectious disease and orthopedics.       Recent Lab Results:  Recent Labs   Lab 01/13/21  0709 01/12/21  0710 01/11/21  0719 01/10/21  0633 01/09/21  1246   WBC 6.8  --   --  6.0 6.0   HGB 12.3* 13.8 13.6 14.7 15.6   HCT 39.4*  --   --  45.7 47.3   *  --   --  99 98     --   --  214 246     Recent Labs   Lab 01/12/21  0710 01/11/21  0719 01/10/21  0633 01/09/21  1311 01/09/21  1246     --  138  --  140   POTASSIUM 4.8  --  3.8  --  4.3   CHLORIDE 101  --  107  --  105   CO2 37*  --  30  --  34*   ANIONGAP <1*  --  1*  --  1*   * 100* 85  --  146*   BUN 10  --  15  --  13   CR 0.97  --  0.93  --  1.02   GFRESTIMATED 82  --  87 75 77   GFRESTBLACK >90  --  >90 >90 90   CATERINA 9.0  --  8.8  --  8.7     Recent Labs   Lab 01/10/21  0832 01/09/21  1502 01/09/21  1304   CULT Moderate growth  Escherichia coli  Preliminary identification and susceptibility testing performed on mixed culture.  Repeat   identification and susceptibility testing in progress, await final report.  *  Moderate growth  Gram negative rods  Susceptibility testing in progress  *  Light growth  Enterococcus faecalis  Susceptibility testing in progress  *  Culture negative monitoring continues No growth after 4 days No growth after 4 days     Recent Labs   Lab 01/13/21  0709 01/12/21  0710 01/11/21  0719   INR 2.01* 1.47* 1.36*          Pending Results:    Unresulted Labs Ordered in the Past 30 Days of this Admission     Date and Time Order Name Status Description    1/10/2021 0832 Tissue Culture Aerobic Bacterial Preliminary     1/10/2021 0832  Anaerobic bacterial culture Preliminary     1/9/2021 1307 Blood culture Preliminary     1/9/2021 1307 Blood culture Preliminary          These results will be followed up by patient's primary care provider.    Discharge Instructions and Follow-Up:   Discharge Procedure Orders   Activity   Order Comments: Your activity upon discharge: OK for weightbearing as tolerated based on pain with the post-op shoe in place. Shoe must be worn at all times while weightbearing.     Order Specific Question Answer Comments   Is discharge order? Yes      Discharge Instructions   Order Comments: 1. Apply ice 20 minutes every two hours as needed to decrease pain and swelling.  2. Keep elevated at all times when at rest above your heart. This helps control swelling.  3. Keep dressing clean, dry, and intact until clinic follow-up.   4. Transition from your narcotic pain control to just tylenol as you are able. Limit total acetaminophen to less than 3grams or 3000mg per day. No driving on narcotic pain medicine like oxycodone.  5. Take stool softener to avoid constipation while taking narcotics, and decrease or stop taking stool softeners if you develop diarrhea  6. Notify care team if persistent nausea, vomiting, or fevers >101.5.     Wound care and dressings   Order Comments: Keep surgical dressing clean, dry, and intact until clinic follow-up with orthopedics.     Follow-up and recommended labs and tests   Order Comments: For your first clinic appointment with Orthopedics, you will follow up with Carolina High PA-C approximately 2 weeks after surgery. Appointment locations include [facility] in [facility]. Call [phone] to set up your appointment.    Follow-up with anticoagulation clinic on 1/15/2021 with INR draw     Reason for your hospital stay   Order Comments: You were hospitalized for an active left leg hematoma and cellulitis.  You underwent incision and drainage for this.  The infection is  improved with IV antibiotics and you will finish her course of oral antibiotics on discharge.  You will follow up with your anticoagulation clinic with INR drawn in 2 days for further warfarin dosing.  Recommend Lovenox injections on discharge until INR is 2.5 or above.  You will have follow-up in orthopedics clinic in 2 weeks.     Full Code     Order Specific Question Answer Comments   Code status determined by: Discussion with patient/ legal decision maker      Rolling Knee Walker DME   Order Comments: DME Documentation: Describe the reason for need to support medical necessity: Impaired gait due to Foot/Ankle surgery. Anticipated length of need: 3 months     Order Specific Question Answer Comments   DME Provider: [facility]    Type: Rolling Knee Walker    Length of Need: 3 Months      Walker DME   Order Comments: DME Documentation: Describe the reason for need to support medical necessity: Impaired gait due to Foot/Ankle surgery. Anticipated length of need: 3 months     Order Specific Question Answer Comments   DME Provider: [facility]    Walker Type: Standard (2 Wheel)    Accessories: N/A      Diet   Order Comments: Follow this diet upon discharge: Orders Placed This Encounter      Advance Diet as Tolerated: Regular Diet Adult     Order Specific Question Answer Comments   Is discharge order? Yes          I, Gold Shay MD, personally saw the patient today and spent greater than 30 minutes discharging this patient.    Gold Shay MD

## 2021-01-13 NOTE — TELEPHONE ENCOUNTER
Medication Appeal Initiation    We have initiated an appeal for the requested medication:  Medication: venlafaxine (EFFEXOR-XR) 150 MG 24 hr capsule-APPEAL INITIATED  Appeal Start Date:  1/13/2021  Insurance Company: [email or web address] Clinical Review - Phone [phone] Fax [phone]  Comments:       Manually faxed letter of medical necessity to [phone].

## 2021-01-13 NOTE — PROGRESS NOTES
Anticipate discharge home later this afternoon on oral antibiotics, will wait for follow up today from ID for final recommendations.

## 2021-01-13 NOTE — PHARMACY-ANTICOAGULATION SERVICE
Clinical Pharmacy- Warfarin Discharge Note  This patient is currently on warfarin for the treatment of Mechanical heart valve.  INR Goal= 2.5-3.5  Expected length of therapy lifetime.    Warfarin PTA Regimen: 7.5 mg Sun  5 mg ROW      Anticoagulation Dose History     Recent Dosing and Labs Latest Ref Rng & Units 1/10/2021 1/10/2021 1/10/2021 1/10/2021 1/11/2021 1/12/2021 1/13/2021    ZZ IMS TEMPLATE - - - - - 7.5 mg 7.5 mg -    Warfarin 5 mg - - - - 5 mg - - -    INR 0.86 - 1.14 2.22(H) 1.88(H) 1.70(H) - 1.36(H) 1.47(H) 2.01(H)    INR 0.86 - 1.14 - - - - - - -          Vitamin K doses administered during the last 7 days:  1mg Vit K IV on 01/09/21  FFP administered during the last 7 days: None      Warfarin Discharge Reconciliation Complete  Patient to resume home warfarin dosing tonight, with Lovenox 1mg/kg  BID bridge until INR > 2.5. Recommend INR check in 2 days.       Enrique Clark, Pharm.D., BCPS

## 2021-01-13 NOTE — PROGRESS NOTES
[facility]  Infectious Disease Progress Note          Assessment and Plan:   IMPRESSION:   1.  A 65-year-old male with a large spontaneous left leg hematoma for several weeks, now worsening, some drainage in wound, status post major incision and drainage, not clearcut deep infection, but culture growing Escherichia coli.  No major clinical sepsis.   2.  Aortic valve replacement and thus anticoagulation.   3.  Atrial fibrillation, also reason for anticoagulation.   4.  Prior methicillin-resistant Staphylococcus aureus lumbar wound infection remotely.  No recent methicillin-resistant Staphylococcus aureus infections.      RECOMMENDATIONS:   1.  Continue ceftriaxone while here, e coli sens, enterococcus unlikely sig, 2nd GNR   2.  OK home p[o ceftin 500 bid 7 days, I will Follow-up on final cx and adjust if needed        Interval History:   no new complaints and doing well; no cp, sob, n/v/d, or abd pain. cx as above no fever               Medications:       acetaminophen  975 mg Oral TID     cefTRIAXone  2 g Intravenous Q24H     docusate sodium  100 mg Oral BID     donepezil  10 mg Oral At Bedtime     enoxaparin ANTICOAGULANT  1 mg/kg Subcutaneous Q12H     folic acid  5 mg Oral Daily     levothyroxine  150 mcg Oral Daily     liothyronine  25 mcg Oral Daily     memantine  10 mg Oral BID     mirabegron  50 mg Oral At Bedtime     polyethylene glycol  17 g Oral Daily     pregabalin  100 mg Oral TID     propafenone  150 mg Oral Q8H     senna-docusate  1 tablet Oral BID     simvastatin  40 mg Oral At Bedtime     sodium chloride (PF)  3 mL Intracatheter Q8H     venlafaxine  300 mg Oral Daily     warfarin ANTICOAGULANT  5 mg Oral ONCE at 18:00                  Physical Exam:   Blood pressure (!) 165/90, pulse 83, temperature 98.2  F (36.8  C), temperature source Temporal, resp. rate 16, height 1.829 m (6'), weight 140.6 kg (309 lb 14.4 oz), SpO2 94 %.  Wt Readings from Last 2 Encounters:   01/09/21  140.6 kg (309 lb 14.4 oz)   12/11/20 141.9 kg (312 lb 12.8 oz)     Vital Signs with Ranges  Temp:  [97  F (36.1  C)-98.3  F (36.8  C)] 98.2  F (36.8  C)  Pulse:  [80-90] 83  Resp:  [12-20] 16  BP: (131-165)/(80-92) 165/90  SpO2:  [91 %-98 %] 94 %    Constitutional: Awake, alert, cooperative, no apparent distress   Lungs: Clear to auscultation bilaterally, no crackles or wheezing   Cardiovascular: Regular rate and rhythm, normal S1 and S2, and no murmur noted   Abdomen: Normal bowel sounds, soft, non-distended, non-tender   Skin: No rashes, no cyanosis, no edema L leg wrapped   Other:           Data:   All microbiology laboratory data reviewed.  Recent Labs   Lab Test 01/13/21  0709 01/12/21  0710 01/11/21  0719 01/10/21  0633 01/09/21  1246   WBC 6.8  --   --  6.0 6.0   HGB 12.3* 13.8 13.6 14.7 15.6   HCT 39.4*  --   --  45.7 47.3   *  --   --  99 98     --   --  214 246     Recent Labs   Lab Test 01/12/21  0710 01/10/21  0633 01/09/21  1246   CR 0.97 0.93 1.02     Recent Labs   Lab Test 04/18/13  0741   SED 30*     Recent Labs   Lab Test 01/10/21  0832 01/09/21  1502 01/09/21  1304 02/09/18  1330 11/01/17  2056 03/06/17  1456 03/06/17  1401 01/18/17  0547 05/23/13  1328   CULT Moderate growth  Escherichia coli  Preliminary identification and susceptibility testing performed on mixed culture.  Repeat   identification and susceptibility testing in progress, await final report.  *  Moderate growth  Gram negative rods  Susceptibility testing in progress  *  Light growth  Enterococcus faecalis  Susceptibility testing in progress  *  Culture negative monitoring continues No growth after 4 days No growth after 4 days No beta hemolytic Streptococcus Group A isolated Moderate growth  Normal urogenital esther   No growth No growth >100,000 colonies/mL Escherichia coli* Light growth Peptostreptococcus species Susceptibility testing not routinely done  Light growth Methicillin resistant Staphylococcus aureus  (MRSA)

## 2021-01-13 NOTE — PLAN OF CARE
Nurse from [id number]: Pt vitals stable, no fever, on RA, denies nausea, no emesis, reports good pain control with tylenol and oxy. Refused to use wedge pillow, able to repo self in bed. Dressing to left leg CDI, no drainage noted, received lovenox for coumadin bridging with INR goal 2.5-3.5. Uses urinal, voids small amount of urine at a time. Up with standby assist, WBAT to left leg, surgical shoe on when out of bed. Educated on fall risk, uses call light appropriately.

## 2021-01-13 NOTE — TELEPHONE ENCOUNTER
Left voice message for [facility] informing them PA was denied and we are appealing this denial. Will update pharmacy if this is approved.

## 2021-01-14 ENCOUNTER — DOCUMENTATION ONLY (OUTPATIENT)
Dept: INTERNAL MEDICINE | Facility: CLINIC | Age: 66
End: 2021-01-14

## 2021-01-14 ENCOUNTER — TELEPHONE (OUTPATIENT)
Dept: INTERNAL MEDICINE | Facility: CLINIC | Age: 66
End: 2021-01-14

## 2021-01-14 DIAGNOSIS — Z79.01 LONG TERM CURRENT USE OF ANTICOAGULANT THERAPY: ICD-10-CM

## 2021-01-14 DIAGNOSIS — Z95.2 AORTIC VALVE PROSTHESIS PRESENT: ICD-10-CM

## 2021-01-14 DIAGNOSIS — I48.20 CHRONIC ATRIAL FIBRILLATION (H): ICD-10-CM

## 2021-01-14 DIAGNOSIS — I48.91 ATRIAL FIBRILLATION (H): Primary | ICD-10-CM

## 2021-01-14 LAB
BACTERIA SPEC CULT: ABNORMAL
BACTERIA SPEC CULT: ABNORMAL
Lab: ABNORMAL
SPECIMEN SOURCE: ABNORMAL

## 2021-01-14 RX ORDER — WARFARIN SODIUM 5 MG/1
TABLET ORAL
Qty: 105 TABLET
Start: 2021-01-14 | End: 2021-02-22

## 2021-01-14 NOTE — PROGRESS NOTES
ANTICOAGULATION  MANAGEMENT: Discharge Review    Todd S Aschoff chart reviewed for anticoagulation continuity of care    Hospital Admission on 1/9 to 1/13/21 for Irrigation and excisional debridement to muscle left distal medial calf chronic hematoma.    Discharge disposition: Home    Results:    Recent labs: (last 7 days)     01/09/21  1246 01/09/21  1759 01/09/21  2213 01/10/21  0209 01/10/21  0636 01/10/21  1007 01/11/21  0719 01/12/21  0710 01/13/21  0709   INR 3.26* 3.27* 2.74* 2.22* 1.88* 1.70* 1.36* 1.47* 2.01*     Anticoagulation inpatient management:     reversed with Vit K 1 mg on1/9/21 and held warfarin due to surgery  and resumed on 1/10/21    Anticoagulation discharge instructions:     Warfarin dosing: home regimen continued 7.5 mg every Sun, Thu; 5 mg all other days   Bridging: bridging with enoxaparin (Lovenox)   INR goal change: No      Medication changes affecting anticoagulation: Yes: Vit K and warfarin hold    Additional factors affecting anticoagulation: Yes: surgery, infection    Plan     No adjustment to anticoagulation plan needed  Agree with discharge plan for follow up on Friday 1/15/21    Left a detailed message for Nicko.  Recommended follow up is scheduled for tomorrow at 9:20 am, pending patient approval.    Attempted to reach Nicko x 3 today without success.    Anticoagulation Calendar updated    Nova John RN

## 2021-01-14 NOTE — TELEPHONE ENCOUNTER
Received a call from SHYANN Souza at [facility] Appeals, she states the PA appeal will me viewed as urgent and should hopefully have a determination by 1/16/21.  Case [id number].  If any questions, call provider services at [phone].

## 2021-01-14 NOTE — PLAN OF CARE
Physical Therapy Discharge Summary    Reason for therapy discharge:    Discharged to home.    Progress towards therapy goal(s). See goals on Care Plan in [facility] electronic health record for goal details.  Goals not met.  Barriers to achieving goals:   discharge from facility.    Therapy recommendation(s):    No further therapy is recommended.

## 2021-01-14 NOTE — TELEPHONE ENCOUNTER
IP F/U    Date: 1/13/21  Diagnosis: Hematoma Of Left Lower Extremity, Initial Encounter, Wound Of Left Lower Extremity, Initial Encounter  Is patient active in care coordination? No  Was patient in TCU? No    ED / Discharge Outreach Protocol    Patient Contact    Attempt # 1    Was call answered?  No.  Left message on voicemail with information to call me back.

## 2021-01-15 ENCOUNTER — HEALTH MAINTENANCE LETTER (OUTPATIENT)
Age: 66
End: 2021-01-15

## 2021-01-15 LAB
BACTERIA SPEC CULT: NO GROWTH
BACTERIA SPEC CULT: NO GROWTH
SPECIMEN SOURCE: NORMAL
SPECIMEN SOURCE: NORMAL

## 2021-01-15 NOTE — PROGRESS NOTES
Patient must have called back central scheduling.   Has a lab INR appointment scheduled for 1/21/21.    Nova John RN

## 2021-01-15 NOTE — PROGRESS NOTES
Attempted to reach patient by phone again.   Left message on voicemail to call back and that he can come in at 9:20am today for INR check.    He has consent to communicate with his wife, Paul, so I tried to reach her by phone. Went straight to voicemail. Did not leave a message.    Nova John RN

## 2021-01-18 NOTE — TELEPHONE ENCOUNTER
Called provider services to check on the status of appeal, the offices are closed for the Holiday.

## 2021-01-19 NOTE — TELEPHONE ENCOUNTER
MEDICATION APPEAL APPROVED    Medication: venlafaxine (EFFEXOR-XR) 150 MG 24 hr capsule-APPEAL APPROVED  Authorization Effective Date: 12/2/2020  Authorization Expiration Date: 1/14/2022  Approved Dose/Quantity:   Reference #:     Insurance Company: [employer] - Phone [phone] Fax [phone]  Expected CoPay:       CoPay Card Available:      Foundation Assistance Needed:    Which Pharmacy is filling the prescription (Not needed for infusion/clinic administered): [id number] - [place], [place] - [address]    Pharmacy will notify patient when medication is ready.

## 2021-01-19 NOTE — TELEPHONE ENCOUNTER
Called and spoke with Karen at provider services, she reached out to appeals and was told that the appeal was approved from 12/2/2020-01/14/22.  A determination is being refaxed over.

## 2021-01-20 DIAGNOSIS — F33.1 MAJOR DEPRESSIVE DISORDER, RECURRENT EPISODE, MODERATE (H): ICD-10-CM

## 2021-01-20 RX ORDER — VENLAFAXINE HYDROCHLORIDE 150 MG/1
300 CAPSULE, EXTENDED RELEASE ORAL DAILY
Qty: 180 CAPSULE | Refills: 3 | Status: SHIPPED | OUTPATIENT
Start: 2021-01-20 | End: 2021-06-18

## 2021-01-21 ENCOUNTER — TELEPHONE (OUTPATIENT)
Dept: INTERNAL MEDICINE | Facility: CLINIC | Age: 66
End: 2021-01-21

## 2021-01-21 DIAGNOSIS — L03.116 CELLULITIS OF LEFT LOWER EXTREMITY: ICD-10-CM

## 2021-01-21 DIAGNOSIS — I48.91 ATRIAL FIBRILLATION, UNSPECIFIED TYPE (H): ICD-10-CM

## 2021-01-21 DIAGNOSIS — L08.9 INFECTION OF WOUND HEMATOMA: ICD-10-CM

## 2021-01-21 DIAGNOSIS — Z79.01 CURRENT USE OF LONG TERM ANTICOAGULATION: Primary | ICD-10-CM

## 2021-01-21 DIAGNOSIS — F33.1 MAJOR DEPRESSIVE DISORDER, RECURRENT EPISODE, MODERATE (H): ICD-10-CM

## 2021-01-21 DIAGNOSIS — T14.8XXA INFECTION OF WOUND HEMATOMA: ICD-10-CM

## 2021-01-21 NOTE — TELEPHONE ENCOUNTER
I attempted to reach patient 3 times on 1/14/21 (7 days ago) and once on 1/15/21. Also tried to reach his wife on 1/15/21.    Needed to set a lab appointment for INR.   Patient did not call me back, but did set up a lab appointment for today at 3:50pm at [facility].    He spoke to Yadi Eisenberg RN on 1/15/21 and indicated that INR appointment was already scheduled. There was no mention of homecare.    From discharge summary 1/13/21:  Passed PT and OT and completed stairs.  Pain well controlled.  Discharge home with follow-up in orthopedics clinic in 2 weeks. Discharged on 1 week of oral cefuroxime.  Discharging on therapeutic Lovenox (has supply at home) until INR therapeutic, recommend repeat INR check in 2 days at his anticoagulation clinic.      No indication that homecare was needed.    SHYANN [facility] ([facility]

## 2021-01-21 NOTE — TELEPHONE ENCOUNTER
Patients wife calling back. She is furious and wants home health care to come out and take his INR today. He has not had it tested for a week. Wife threatening contacting a . She wants to hear from a nurse or Dr Ingram today. Ok to call and  [phone]

## 2021-01-21 NOTE — TELEPHONE ENCOUNTER
Nova John RN has been attempting to reach patient. Will refer call to her.     If patient unable to come in for INR, we can try to place Home Care RN or Paramedic to come to his home to draw INR.   They may not be able to do today, then should be tomorrow.     RN, please coordinate with [facility]--will also route to them (order placed).

## 2021-01-21 NOTE — TELEPHONE ENCOUNTER
Patient calling to set up home healthcare for his INR. Patient had surgery and can not come into the clinic for any blood work. He is not able to bare any weight on his leg. Please call and ok to zachary [phone] to talk with his wife Lissy

## 2021-01-22 ENCOUNTER — APPOINTMENT (OUTPATIENT)
Dept: GENERAL RADIOLOGY | Facility: CLINIC | Age: 66
End: 2021-01-22
Attending: EMERGENCY MEDICINE
Payer: COMMERCIAL

## 2021-01-22 ENCOUNTER — HOSPITAL ENCOUNTER (INPATIENT)
Facility: CLINIC | Age: 66
LOS: 17 days | Discharge: NURSING, REHAB OR LONG-TERM CARE | End: 2021-02-08
Attending: EMERGENCY MEDICINE | Admitting: HOSPITALIST
Payer: COMMERCIAL

## 2021-01-22 ENCOUNTER — APPOINTMENT (OUTPATIENT)
Dept: MRI IMAGING | Facility: CLINIC | Age: 66
End: 2021-01-22
Attending: EMERGENCY MEDICINE
Payer: COMMERCIAL

## 2021-01-22 ENCOUNTER — APPOINTMENT (OUTPATIENT)
Dept: CT IMAGING | Facility: CLINIC | Age: 66
End: 2021-01-22
Attending: EMERGENCY MEDICINE
Payer: COMMERCIAL

## 2021-01-22 ENCOUNTER — APPOINTMENT (OUTPATIENT)
Dept: MRI IMAGING | Facility: CLINIC | Age: 66
End: 2021-01-22
Attending: HOSPITALIST
Payer: COMMERCIAL

## 2021-01-22 DIAGNOSIS — Z79.899 POLYPHARMACY: ICD-10-CM

## 2021-01-22 DIAGNOSIS — G89.29 CHRONIC BILATERAL LOW BACK PAIN WITHOUT SCIATICA: ICD-10-CM

## 2021-01-22 DIAGNOSIS — N28.9 RENAL INSUFFICIENCY: ICD-10-CM

## 2021-01-22 DIAGNOSIS — M62.81 GENERALIZED MUSCLE WEAKNESS: ICD-10-CM

## 2021-01-22 DIAGNOSIS — Z95.2 AORTIC VALVE PROSTHESIS PRESENT: Primary | ICD-10-CM

## 2021-01-22 DIAGNOSIS — D64.9 ANEMIA, UNSPECIFIED TYPE: ICD-10-CM

## 2021-01-22 DIAGNOSIS — S81.802A WOUND OF LEFT LOWER EXTREMITY, INITIAL ENCOUNTER: ICD-10-CM

## 2021-01-22 DIAGNOSIS — M54.50 CHRONIC BILATERAL LOW BACK PAIN WITHOUT SCIATICA: ICD-10-CM

## 2021-01-22 DIAGNOSIS — I95.9 HYPOTENSION, UNSPECIFIED HYPOTENSION TYPE: ICD-10-CM

## 2021-01-22 LAB
ABO + RH BLD: NORMAL
ABO + RH BLD: NORMAL
ALBUMIN SERPL-MCNC: 3.2 G/DL (ref 3.4–5)
ALBUMIN UR-MCNC: 100 MG/DL
ALP SERPL-CCNC: 106 U/L (ref 40–150)
ALT SERPL W P-5'-P-CCNC: 24 U/L (ref 0–70)
AMPHETAMINES UR QL SCN: NEGATIVE
ANION GAP SERPL CALCULATED.3IONS-SCNC: 1 MMOL/L (ref 3–14)
APAP SERPL-MCNC: <2 MG/L (ref 10–20)
APPEARANCE UR: ABNORMAL
AST SERPL W P-5'-P-CCNC: 25 U/L (ref 0–45)
BACTERIA #/AREA URNS HPF: ABNORMAL /HPF
BARBITURATES UR QL: NEGATIVE
BASE EXCESS BLDV CALC-SCNC: 2.3 MMOL/L
BASOPHILS # BLD AUTO: 0 10E9/L (ref 0–0.2)
BASOPHILS NFR BLD AUTO: 0.3 %
BENZODIAZ UR QL: POSITIVE
BILIRUB SERPL-MCNC: 0.9 MG/DL (ref 0.2–1.3)
BILIRUB UR QL STRIP: NEGATIVE
BLD GP AB SCN SERPL QL: NORMAL
BLD PROD TYP BPU: NORMAL
BLOOD BANK CMNT PATIENT-IMP: NORMAL
BLOOD BANK CMNT PATIENT-IMP: NORMAL
BUN SERPL-MCNC: 18 MG/DL (ref 7–30)
CALCIUM SERPL-MCNC: 9.1 MG/DL (ref 8.5–10.1)
CANNABINOIDS UR QL SCN: NEGATIVE
CHLORIDE SERPL-SCNC: 105 MMOL/L (ref 94–109)
CK SERPL-CCNC: 34 U/L (ref 30–300)
CO2 SERPL-SCNC: 31 MMOL/L (ref 20–32)
COCAINE UR QL: NEGATIVE
COLOR UR AUTO: YELLOW
CREAT BLD-MCNC: 1.5 MG/DL (ref 0.66–1.25)
CREAT SERPL-MCNC: 1.58 MG/DL (ref 0.66–1.25)
DIFFERENTIAL METHOD BLD: ABNORMAL
EOSINOPHIL # BLD AUTO: 0 10E9/L (ref 0–0.7)
EOSINOPHIL NFR BLD AUTO: 0.6 %
ERYTHROCYTE [DISTWIDTH] IN BLOOD BY AUTOMATED COUNT: 13.9 % (ref 10–15)
ETHANOL SERPL-MCNC: <0.01 G/DL
GFR SERPL CREATININE-BSD FRML MDRD: 46 ML/MIN/{1.73_M2}
GFR SERPL CREATININE-BSD FRML MDRD: 47 ML/MIN/{1.73_M2}
GLUCOSE SERPL-MCNC: 125 MG/DL (ref 70–99)
GLUCOSE UR STRIP-MCNC: 70 MG/DL
HCO3 BLDV-SCNC: 30 MMOL/L (ref 21–28)
HCT VFR BLD AUTO: 40.2 % (ref 40–53)
HEMOCCULT STL QL: NEGATIVE
HGB BLD-MCNC: 12.8 G/DL (ref 13.3–17.7)
HGB UR QL STRIP: ABNORMAL
IMM GRANULOCYTES # BLD: 0 10E9/L (ref 0–0.4)
IMM GRANULOCYTES NFR BLD: 0.3 %
INR PPP: 3.65 (ref 0.86–1.14)
INTERPRETATION ECG - MUSE: NORMAL
KETONES UR STRIP-MCNC: NEGATIVE MG/DL
LABORATORY COMMENT REPORT: NORMAL
LACTATE BLD-SCNC: 1.4 MMOL/L (ref 0.7–2)
LEUKOCYTE ESTERASE UR QL STRIP: NEGATIVE
LYMPHOCYTES # BLD AUTO: 0.9 10E9/L (ref 0.8–5.3)
LYMPHOCYTES NFR BLD AUTO: 14.2 %
MAGNESIUM SERPL-MCNC: 2 MG/DL (ref 1.6–2.3)
MCH RBC QN AUTO: 31.8 PG (ref 26.5–33)
MCHC RBC AUTO-ENTMCNC: 31.8 G/DL (ref 31.5–36.5)
MCV RBC AUTO: 100 FL (ref 78–100)
MONOCYTES # BLD AUTO: 0.6 10E9/L (ref 0–1.3)
MONOCYTES NFR BLD AUTO: 8.9 %
MUCOUS THREADS #/AREA URNS LPF: PRESENT /LPF
NEUTROPHILS # BLD AUTO: 5 10E9/L (ref 1.6–8.3)
NEUTROPHILS NFR BLD AUTO: 75.7 %
NITRATE UR QL: NEGATIVE
NRBC # BLD AUTO: 0 10*3/UL
NRBC BLD AUTO-RTO: 0 /100
NT-PROBNP SERPL-MCNC: 329 PG/ML (ref 0–900)
NUM BPU REQUESTED: 2
O2/TOTAL GAS SETTING VFR VENT: ABNORMAL %
OPIATES UR QL SCN: NEGATIVE
OXYHGB MFR BLDV: 21 %
PCO2 BLDV: 58 MM HG (ref 40–50)
PCP UR QL SCN: NEGATIVE
PH BLDV: 7.32 PH (ref 7.32–7.43)
PH UR STRIP: 6 PH (ref 5–7)
PLATELET # BLD AUTO: 259 10E9/L (ref 150–450)
PO2 BLDV: 19 MM HG (ref 25–47)
POTASSIUM SERPL-SCNC: 4.3 MMOL/L (ref 3.4–5.3)
PROCALCITONIN SERPL-MCNC: <0.05 NG/ML
PROT SERPL-MCNC: 7.2 G/DL (ref 6.8–8.8)
RBC # BLD AUTO: 4.03 10E12/L (ref 4.4–5.9)
RBC #/AREA URNS AUTO: 7 /HPF (ref 0–2)
SALICYLATES SERPL-MCNC: <2 MG/DL
SARS-COV-2 RNA RESP QL NAA+PROBE: NEGATIVE
SODIUM SERPL-SCNC: 137 MMOL/L (ref 133–144)
SOURCE: ABNORMAL
SP GR UR STRIP: 1.02 (ref 1–1.03)
SPECIMEN EXP DATE BLD: NORMAL
SPECIMEN SOURCE: NORMAL
TROPONIN I SERPL-MCNC: <0.015 UG/L (ref 0–0.04)
UROBILINOGEN UR STRIP-MCNC: NORMAL MG/DL (ref 0–2)
WBC # BLD AUTO: 6.5 10E9/L (ref 4–11)
WBC #/AREA URNS AUTO: 2 /HPF (ref 0–5)

## 2021-01-22 PROCEDURE — 70549 MR ANGIOGRAPH NECK W/O&W/DYE: CPT

## 2021-01-22 PROCEDURE — 83880 ASSAY OF NATRIURETIC PEPTIDE: CPT | Performed by: EMERGENCY MEDICINE

## 2021-01-22 PROCEDURE — A9585 GADOBUTROL INJECTION: HCPCS | Performed by: HOSPITALIST

## 2021-01-22 PROCEDURE — 86922 COMPATIBILITY TEST ANTIGLOB: CPT | Performed by: EMERGENCY MEDICINE

## 2021-01-22 PROCEDURE — 82272 OCCULT BLD FECES 1-3 TESTS: CPT | Performed by: EMERGENCY MEDICINE

## 2021-01-22 PROCEDURE — 86900 BLOOD TYPING SEROLOGIC ABO: CPT | Performed by: EMERGENCY MEDICINE

## 2021-01-22 PROCEDURE — 87635 SARS-COV-2 COVID-19 AMP PRB: CPT | Performed by: EMERGENCY MEDICINE

## 2021-01-22 PROCEDURE — C9803 HOPD COVID-19 SPEC COLLECT: HCPCS

## 2021-01-22 PROCEDURE — 82805 BLOOD GASES W/O2 SATURATION: CPT | Performed by: EMERGENCY MEDICINE

## 2021-01-22 PROCEDURE — 84484 ASSAY OF TROPONIN QUANT: CPT | Performed by: EMERGENCY MEDICINE

## 2021-01-22 PROCEDURE — 80143 DRUG ASSAY ACETAMINOPHEN: CPT | Performed by: EMERGENCY MEDICINE

## 2021-01-22 PROCEDURE — 83605 ASSAY OF LACTIC ACID: CPT | Performed by: EMERGENCY MEDICINE

## 2021-01-22 PROCEDURE — 85025 COMPLETE CBC W/AUTO DIFF WBC: CPT | Performed by: EMERGENCY MEDICINE

## 2021-01-22 PROCEDURE — 80307 DRUG TEST PRSMV CHEM ANLYZR: CPT | Performed by: EMERGENCY MEDICINE

## 2021-01-22 PROCEDURE — 80179 DRUG ASSAY SALICYLATE: CPT | Performed by: EMERGENCY MEDICINE

## 2021-01-22 PROCEDURE — 84145 PROCALCITONIN (PCT): CPT | Performed by: EMERGENCY MEDICINE

## 2021-01-22 PROCEDURE — 83735 ASSAY OF MAGNESIUM: CPT | Performed by: EMERGENCY MEDICINE

## 2021-01-22 PROCEDURE — 96365 THER/PROPH/DIAG IV INF INIT: CPT

## 2021-01-22 PROCEDURE — 93005 ELECTROCARDIOGRAM TRACING: CPT

## 2021-01-22 PROCEDURE — 80053 COMPREHEN METABOLIC PANEL: CPT | Performed by: EMERGENCY MEDICINE

## 2021-01-22 PROCEDURE — 85610 PROTHROMBIN TIME: CPT | Performed by: EMERGENCY MEDICINE

## 2021-01-22 PROCEDURE — 255N000002 HC RX 255 OP 636: Performed by: HOSPITALIST

## 2021-01-22 PROCEDURE — 71045 X-RAY EXAM CHEST 1 VIEW: CPT

## 2021-01-22 PROCEDURE — 99223 1ST HOSP IP/OBS HIGH 75: CPT | Mod: AI | Performed by: HOSPITALIST

## 2021-01-22 PROCEDURE — 70450 CT HEAD/BRAIN W/O DYE: CPT

## 2021-01-22 PROCEDURE — 73590 X-RAY EXAM OF LOWER LEG: CPT | Mod: LT

## 2021-01-22 PROCEDURE — 99291 CRITICAL CARE FIRST HOUR: CPT | Mod: 25

## 2021-01-22 PROCEDURE — 70553 MRI BRAIN STEM W/O & W/DYE: CPT

## 2021-01-22 PROCEDURE — 70544 MR ANGIOGRAPHY HEAD W/O DYE: CPT

## 2021-01-22 PROCEDURE — 86901 BLOOD TYPING SEROLOGIC RH(D): CPT | Performed by: EMERGENCY MEDICINE

## 2021-01-22 PROCEDURE — 86902 BLOOD TYPE ANTIGEN DONOR EA: CPT | Performed by: EMERGENCY MEDICINE

## 2021-01-22 PROCEDURE — 250N000011 HC RX IP 250 OP 636: Performed by: EMERGENCY MEDICINE

## 2021-01-22 PROCEDURE — 258N000003 HC RX IP 258 OP 636: Performed by: EMERGENCY MEDICINE

## 2021-01-22 PROCEDURE — 82077 ASSAY SPEC XCP UR&BREATH IA: CPT | Performed by: EMERGENCY MEDICINE

## 2021-01-22 PROCEDURE — 120N000001 HC R&B MED SURG/OB

## 2021-01-22 PROCEDURE — 87040 BLOOD CULTURE FOR BACTERIA: CPT | Performed by: EMERGENCY MEDICINE

## 2021-01-22 PROCEDURE — 82565 ASSAY OF CREATININE: CPT

## 2021-01-22 PROCEDURE — 96361 HYDRATE IV INFUSION ADD-ON: CPT

## 2021-01-22 PROCEDURE — 81001 URINALYSIS AUTO W/SCOPE: CPT | Performed by: EMERGENCY MEDICINE

## 2021-01-22 PROCEDURE — 82550 ASSAY OF CK (CPK): CPT | Performed by: EMERGENCY MEDICINE

## 2021-01-22 PROCEDURE — 36415 COLL VENOUS BLD VENIPUNCTURE: CPT

## 2021-01-22 PROCEDURE — 86850 RBC ANTIBODY SCREEN: CPT | Performed by: EMERGENCY MEDICINE

## 2021-01-22 PROCEDURE — 36415 COLL VENOUS BLD VENIPUNCTURE: CPT | Performed by: EMERGENCY MEDICINE

## 2021-01-22 PROCEDURE — 250N000013 HC RX MED GY IP 250 OP 250 PS 637: Performed by: HOSPITALIST

## 2021-01-22 PROCEDURE — 258N000003 HC RX IP 258 OP 636: Performed by: HOSPITALIST

## 2021-01-22 RX ORDER — LEVOTHYROXINE SODIUM 150 UG/1
150 TABLET ORAL DAILY
Status: DISCONTINUED | OUTPATIENT
Start: 2021-01-23 | End: 2021-02-08 | Stop reason: HOSPADM

## 2021-01-22 RX ORDER — FOLIC ACID 1 MG/1
5 TABLET ORAL DAILY
Status: DISCONTINUED | OUTPATIENT
Start: 2021-01-23 | End: 2021-02-08 | Stop reason: HOSPADM

## 2021-01-22 RX ORDER — AMOXICILLIN 250 MG
1 CAPSULE ORAL 2 TIMES DAILY PRN
Status: DISCONTINUED | OUTPATIENT
Start: 2021-01-22 | End: 2021-02-08 | Stop reason: HOSPADM

## 2021-01-22 RX ORDER — CETIRIZINE HYDROCHLORIDE 10 MG/1
10 TABLET ORAL DAILY PRN
Status: DISCONTINUED | OUTPATIENT
Start: 2021-01-22 | End: 2021-02-08 | Stop reason: HOSPADM

## 2021-01-22 RX ORDER — DONEPEZIL HYDROCHLORIDE 5 MG/1
20 TABLET, FILM COATED ORAL AT BEDTIME
Status: DISCONTINUED | OUTPATIENT
Start: 2021-01-22 | End: 2021-02-08 | Stop reason: HOSPADM

## 2021-01-22 RX ORDER — GUANFACINE 1 MG/1
1 TABLET ORAL AT BEDTIME
Status: DISCONTINUED | OUTPATIENT
Start: 2021-01-22 | End: 2021-02-08 | Stop reason: HOSPADM

## 2021-01-22 RX ORDER — CEFTRIAXONE 1 G/1
1 INJECTION, POWDER, FOR SOLUTION INTRAMUSCULAR; INTRAVENOUS ONCE
Status: COMPLETED | OUTPATIENT
Start: 2021-01-22 | End: 2021-01-22

## 2021-01-22 RX ORDER — GADOBUTROL 604.72 MG/ML
10 INJECTION INTRAVENOUS ONCE
Status: COMPLETED | OUTPATIENT
Start: 2021-01-22 | End: 2021-01-22

## 2021-01-22 RX ORDER — PROPAFENONE HYDROCHLORIDE 150 MG/1
150 TABLET, COATED ORAL EVERY 8 HOURS
Status: DISCONTINUED | OUTPATIENT
Start: 2021-01-22 | End: 2021-02-08 | Stop reason: HOSPADM

## 2021-01-22 RX ORDER — SIMVASTATIN 40 MG
40 TABLET ORAL AT BEDTIME
Status: DISCONTINUED | OUTPATIENT
Start: 2021-01-23 | End: 2021-02-08 | Stop reason: HOSPADM

## 2021-01-22 RX ORDER — WARFARIN SODIUM 5 MG/1
5 TABLET ORAL
Status: COMPLETED | OUTPATIENT
Start: 2021-01-22 | End: 2021-01-22

## 2021-01-22 RX ORDER — AZELASTINE 1 MG/ML
2 SPRAY, METERED NASAL 2 TIMES DAILY PRN
Status: DISCONTINUED | OUTPATIENT
Start: 2021-01-22 | End: 2021-02-08 | Stop reason: HOSPADM

## 2021-01-22 RX ORDER — SODIUM CHLORIDE 9 MG/ML
INJECTION, SOLUTION INTRAVENOUS CONTINUOUS
Status: ACTIVE | OUTPATIENT
Start: 2021-01-22 | End: 2021-01-23

## 2021-01-22 RX ORDER — ACETAMINOPHEN 500 MG
1000 TABLET ORAL 3 TIMES DAILY
Status: DISCONTINUED | OUTPATIENT
Start: 2021-01-22 | End: 2021-02-08 | Stop reason: HOSPADM

## 2021-01-22 RX ORDER — MEMANTINE HYDROCHLORIDE 5 MG/1
10 TABLET ORAL 2 TIMES DAILY
Status: DISCONTINUED | OUTPATIENT
Start: 2021-01-22 | End: 2021-02-08 | Stop reason: HOSPADM

## 2021-01-22 RX ORDER — LIDOCAINE 40 MG/G
CREAM TOPICAL
Status: DISCONTINUED | OUTPATIENT
Start: 2021-01-22 | End: 2021-02-08 | Stop reason: HOSPADM

## 2021-01-22 RX ORDER — PREGABALIN 100 MG/1
100 CAPSULE ORAL 3 TIMES DAILY
Status: DISCONTINUED | OUTPATIENT
Start: 2021-01-22 | End: 2021-02-08 | Stop reason: HOSPADM

## 2021-01-22 RX ORDER — LIOTHYRONINE SODIUM 25 UG/1
25 TABLET ORAL DAILY
Status: DISCONTINUED | OUTPATIENT
Start: 2021-01-23 | End: 2021-02-08 | Stop reason: HOSPADM

## 2021-01-22 RX ADMIN — SODIUM CHLORIDE: 9 INJECTION, SOLUTION INTRAVENOUS at 18:17

## 2021-01-22 RX ADMIN — PROPAFENONE HYDROCHLORIDE 150 MG: 150 TABLET, FILM COATED ORAL at 21:55

## 2021-01-22 RX ADMIN — DONEPEZIL HYDROCHLORIDE 20 MG: 10 TABLET ORAL at 21:54

## 2021-01-22 RX ADMIN — ACETAMINOPHEN 1000 MG: 500 TABLET, FILM COATED ORAL at 20:47

## 2021-01-22 RX ADMIN — SODIUM CHLORIDE 1000 ML: 9 INJECTION, SOLUTION INTRAVENOUS at 11:36

## 2021-01-22 RX ADMIN — CEFTRIAXONE 1 G: 1 INJECTION, POWDER, FOR SOLUTION INTRAMUSCULAR; INTRAVENOUS at 12:11

## 2021-01-22 RX ADMIN — GUANFACINE 1 MG: 1 TABLET ORAL at 21:55

## 2021-01-22 RX ADMIN — PREGABALIN 100 MG: 100 CAPSULE ORAL at 20:47

## 2021-01-22 RX ADMIN — SODIUM CHLORIDE 1000 ML: 9 INJECTION, SOLUTION INTRAVENOUS at 11:19

## 2021-01-22 RX ADMIN — WARFARIN SODIUM 5 MG: 5 TABLET ORAL at 20:47

## 2021-01-22 RX ADMIN — MEMANTINE 10 MG: 5 TABLET ORAL at 20:47

## 2021-01-22 RX ADMIN — GADOBUTROL 10 ML: 604.72 INJECTION INTRAVENOUS at 19:35

## 2021-01-22 ASSESSMENT — ACTIVITIES OF DAILY LIVING (ADL)
ADLS_ACUITY_SCORE: 19
ADLS_ACUITY_SCORE: 19

## 2021-01-22 ASSESSMENT — ENCOUNTER SYMPTOMS
VOMITING: 0
ABDOMINAL PAIN: 0
SHORTNESS OF BREATH: 0
DIARRHEA: 0
BLOOD IN STOOL: 0
NAUSEA: 0
WEAKNESS: 1

## 2021-01-22 ASSESSMENT — MIFFLIN-ST. JEOR: SCORE: 2246.4

## 2021-01-22 NOTE — TELEPHONE ENCOUNTER
Jacqueline, [facility] Paramedic, returned call. Jacqueline stated someone from their team would be able to see patient this afternoon to assess the patient wound and draw an INR.     Left a message at [facility] to update them on the status of patient to see if he should be seen sooner than his scheduled appointment.     Spouse called, stating patient was found on the floor this morning incoherent and is being brought to the hospital via ambulance.     [facility] updated.     Lucina Andrew, RN, BSN

## 2021-01-22 NOTE — ED NOTES
[facility]  ED Nurse Handoff Report    Todd S Aschoff is a 65 year old male   ED Chief complaint: Generalized Weakness  . ED Diagnosis:   Final diagnoses:   Generalized muscle weakness   Hypotension, unspecified hypotension type   Polypharmacy   Anemia, unspecified type   Renal insufficiency     Allergies:   Allergies   Allergen Reactions     Gabapentin      Severe behavioral disturbances       Code Status: Prior  Activity level - Baseline/Home:  Assist X 1. Activity Level - Current:   Assist X 2- have not gotten patient up. Just assisted with rolling in bed. . Lift room needed: No. Bariatric: No   Needed: No   Isolation: Yes. Infection: Not Applicable  MRSA.     Vital Signs:   Vitals:    01/22/21 1255 01/22/21 1310 01/22/21 1315 01/22/21 1330   BP:   104/72 110/84   Pulse: 60 60 61 65   Resp:       Temp:       TempSrc:       SpO2: 97% 99% 100% 98%       Cardiac Rhythm:  ,   Cardiac  Cardiac Rhythm: Normal sinus rhythm  Pain level:    Patient confused: Yes. Patient Falls Risk: Yes.   Elimination Status: Has voided   Patient Report - Initial Complaint:   Pt arrives to ED he fell out of bed called 911 pt couldn't get up lives at home with family. Pt has been weak since surgery on his left leg recently taking valium and trazodone last med 5 mg valium 10p. Pt a/o x4, ABC's intact.      . Focused Assessment:   Skin - Skin WDL: color; characteristics; all  Skin Color: pale  Skin Temperature: cool  Skin Moisture: dry  GRAY HERNÁNDEZ MS          Jan 22 11:15 11:15:00 Neurological Cognitive - Cognitive/Neuro/Behavioral WDL: mood/behavior; orientation  Mood/Behavior: flat affect; cooperative   [place] Coma Scale - Best Eye Response: 4-->(E4) spontaneous  Best Motor Response: 6-->(M6) obeys commands  Best Verbal Response: 5-->(V5) oriented (intermittent confusion) [place] Coma Scale Score: 15  GRAY HERNÁNDEZ MS     Jan 22 11:15 11:15:00 Triage Vitals Vital Signs - Pulse Review: 60  GRAY HERNÁNDEZ MS      Jan 22 11:15 11:15:00 Vitals Reassessment Vitals Assessment - Automatic Restart Vitals Timer: Yes  GRAY HERNÁNDEZ MS     Jan 22 11:15 11:15:00 Incision/Surgical Site 01/10/21 Left Leg Assessment Dressing Intervention: Open to air / No Dressing  Incision Assessment: Erythema  Natalee-Incision Assessment: Dusky; Ecchymosis; Erythema  Incision Drainage Amount: Moderate  Drainage Description: Purulent  Incision Care: Other (Comment) (dressing removed for assessment )           Tests Performed: Labs, UA, EKG, CT, Xray . Abnormal Results:   Labs Ordered and Resulted from Time of ED Arrival Up to the Time of Departure from the ED   COMPREHENSIVE METABOLIC PANEL - Abnormal; Notable for the following components:       Result Value    Anion Gap 1 (*)     Glucose 125 (*)     Creatinine 1.58 (*)     GFR Estimate 46 (*)     GFR Estimate If Black 53 (*)     Albumin 3.2 (*)     All other components within normal limits   CBC WITH PLATELETS DIFFERENTIAL - Abnormal; Notable for the following components:    RBC Count 4.03 (*)     Hemoglobin 12.8 (*)     All other components within normal limits   INR - Abnormal; Notable for the following components:    INR 3.65 (*)     All other components within normal limits   ROUTINE UA WITH MICROSCOPIC - Abnormal; Notable for the following components:    Glucose Urine 70 (*)     Blood Urine Small (*)     Protein Albumin Urine 100 (*)     RBC Urine 7 (*)     Bacteria Urine Few (*)     Mucous Urine Present (*)     All other components within normal limits   DRUG ABUSE SCREEN 77 URINE (FL, RH, SH) - Abnormal; Notable for the following components:    Benzodiazepine Qual Urine Positive (*)     All other components within normal limits   BLOOD GAS VENOUS AND OXYHGB - Abnormal; Notable for the following components:    PCO2 Venous 58 (*)     PO2 Venous 19 (*)     Bicarbonate Venous 30 (*)     All other components within normal limits   CREATININE POCT - Abnormal; Notable for the following components:     Creatinine 1.5 (*)     GFR Estimate 47 (*)     GFR Estimate If Black 57 (*)     All other components within normal limits   TROPONIN I   LACTIC ACID WHOLE BLOOD   ACETAMINOPHEN LEVEL   SALICYLATE LEVEL   ALCOHOL ETHYL   OCCULT BLOOD STOOL   CK TOTAL   MAGNESIUM   NT PROBNP INPATIENT   PROCALCITONIN   SARS-COV-2 (COVID-19) VIRUS RT-PCR   STRAIGHT CATH FOR URINE   ISTAT CREATININE NURSING POCT   ABO/RH TYPE AND SCREEN   BLOOD CULTURE   BLOOD CULTURE     Head CT w/o contrast   Final Result   IMPRESSION:    1. New area of hypoattenuation involving the right occipital lobe,   suspicious for infarct, potentially subacute.   2. No evidence of hemorrhage.   3. Multiple old infarcts.      BRADLEY ROMO MD      XR Chest Port 1 View   Preliminary Result   IMPRESSION: Subtle new patchy opacities at the left lung base compared   to the prior exam. Cannot exclude developing subtle airspace disease   including potentially pneumonia. Right lung appears clear. Stable   elevated right hemidiaphragm. Stable cardiac silhouette and   sternotomy.      XR Tibia & Fibula Port Left 2 Views   Final Result   IMPRESSION: No evidence of tibial or fibular osseous destruction or   fracture. Extensive atherosclerotic calcification is noted throughout   the leg.      MAITE BRENABE MD        .   Treatments provided: Fluids, Antibiotics   Family Comments: Wife updated via phone   OBS brochure/video discussed/provided to patient:  N/A  ED Medications:   Medications   0.9% sodium chloride BOLUS (0 mLs Intravenous Stopped 1/22/21 1227)   0.9% sodium chloride BOLUS (0 mLs Intravenous Stopped 1/22/21 1227)   cefTRIAXone (ROCEPHIN) 1 g vial to attach to  mL bag for ADULTS or NS 50 mL bag for PEDS (0 g Intravenous Stopped 1/22/21 1248)     Drips infusing:  No  For the majority of the shift, the patient's behavior Green. Interventions performed were NA.    Sepsis treatment initiated: No     Patient tested for COVID 19 prior to admission: YES    ED  Nurse Name/Phone Number: Queta Farley RN,   1:37 PM    RECEIVING UNIT ED HANDOFF REVIEW    Above ED Nurse Handoff Report was reviewed: Yes  Reviewed by: Kayleigh Garrison RN on January 22, 2021 at 2:18 PM

## 2021-01-22 NOTE — TELEPHONE ENCOUNTER
Joann with [facility] calls, they received home care referral, but she states they cannot see him for INR alone.

## 2021-01-22 NOTE — ED PROVIDER NOTES
History   Chief Complaint:  Generalized Weakness       HPI   Todd S Aschoff is a 65 year old male with history of  medical history significant for aortic dissection s/p mechanical aortic valve plus graft in 1992, atrial fibrillation, on chronic anticoagulation with warfarin, chronic pain, depression, who presented to the hospital with concerns for a leg wound with drainage that presents to the ED for generalized weakness. Patient stated he fell out of his bed today hitting his head and felt too weak to get back on his own. He had a recent leg surgery and is unable to bear weight well without the boot. He expressed concernes that he took too much of his Trazedone.  He denies chest pain, shortness of breath, abdominal pain, nausea, vomiting, diarrhea, tarry or bloody stool. He also denies current suicidal ideation.    Per chart review, patient was recently discharged on 1/13 for the treatment of his draining would of left lower extremity that developed after he fell about a month ago. He was started on IV Ancef and underwent I&D on 1/10. Tissue cultures grew E. Coli, second GNR species, and Enterococcus faecalis. ID believes E. Coli is the likely pathogen. The patient clinically improved with IV ceftriaxone. He was discharged home with follow-up in orthopedic clinic in two weeks and with one week of oral cefuroxime. He was also discharged on Lovenox until INR is therapeutic      Review of Systems   Respiratory: Negative for shortness of breath.    Cardiovascular: Negative for chest pain.   Gastrointestinal: Negative for abdominal pain, blood in stool, diarrhea, nausea and vomiting.   Neurological: Positive for weakness (generalized).   Psychiatric/Behavioral: Negative for suicidal ideas.   All other systems reviewed and are negative.      Allergies:  Gabapentin     Medications:  Venlafaxine   Warfarin  Memantine  Oxycodone  mirabegron   guanFACINE   levothyroxine   pregabalin   propafenone   simvastatin   folic acid    liothyronine   Diazepam  Donepezil  Melatonin  cyclobenzaprine   Albuterol  Lovenox    Past Medical History:    Alcohol dependence  Aortic valve prosthesis present  Atrial fibrillation  Blood transfusion  Benign prostatic hypertrophy  Chronic infection  Chronic back pain  Coagulation disorder  Dissection of aorta, thoracic   Heart murmur  History of spinal cord injury  Hypothyroidism   Low back pain   Major depression   Hyperlipidemia   Numbness and tingling   Osteoarthritis  Obesity  Prostate infection   Sciatica   Unspecified hypothyroidism   Hematoma  Suicide attempt by drug overdose       Past Surgical History:    Aortic valve replacement   Hip arthroplasty   Cataract IOL  Cholecystectomy   Colonoscopy   Lumbar decompression   Cervical fusion   Tonsillectomy   Back cyst removal      Family History:    Cerebrovascular disease (father)  Prostate cancer (father)  Cancer (mother)   Hypertension (brother, sister)  Sleep apnea (brother)      Social History:  The patient lives at home with family.  The patient arrives via EMS.    Physical Exam     Patient Vitals for the past 24 hrs:   BP Temp Temp src Pulse Resp SpO2   01/22/21 1315 104/72 -- -- 61 -- 100 %   01/22/21 1310 -- -- -- 60 -- 99 %   01/22/21 1255 -- -- -- 60 -- 97 %   01/22/21 1250 -- -- -- 59 -- 99 %   01/22/21 1245 95/76 -- -- 60 -- 97 %   01/22/21 1240 100/69 -- -- 62 -- --   01/22/21 1235 103/79 -- -- -- -- --   01/22/21 1230 92/65 -- -- -- -- 98 %   01/22/21 1220 99/66 -- -- -- -- --   01/22/21 1215 95/67 -- -- 57 -- 100 %   01/22/21 1213 (!) 86/55 -- -- 59 -- 99 %   01/22/21 1210 (!) 86/55 -- -- 57 -- 98 %   01/22/21 1205 (!) 86/55 -- -- 58 -- 97 %   01/22/21 1200 (!) 86/49 -- -- 59 -- 94 %   01/22/21 1155 (!) 80/49 -- -- 58 -- 96 %   01/22/21 1145 (!) 84/57 -- -- 60 -- 98 %   01/22/21 1140 (!) 86/63 -- -- 58 -- 97 %   01/22/21 1135 (!) 77/43 -- -- 59 -- 93 %   01/22/21 1130 (!) 53/26 -- -- 58 -- 98 %   01/22/21 1125 (!) 79/40 96.8  F (36  C)  Temporal 56 -- 98 %   01/22/21 1120 (!) 68/36 -- -- 57 -- 98 %   01/22/21 1115 (!) 68/38 -- -- 60 -- 94 %   01/22/21 1100 (!) 76/46 -- -- 55 -- 91 %   01/22/21 1055 (!) 83/56 -- -- 67 18 99 %       Physical Exam  General: Alert. Appears comfortable  Head:  The scalp, face, and head appear normal without trauma  Eyes:  Sclera white; Pupils are equal and round  ENT:    External ears normal.  No hemotympanum.      External nares normal.  No septal hematoma.    Neck:  No midline tenderness or pain with full ROM.  CV:  Rate as above with regular rhythm   Systolic murmur  2/2 radial and dorsal pedal pulses  Resp:  Breath sounds clear and equal bilaterally    Non-labored, no retractions or accessory muscle use  GI:  Abdomen soft, non-tender, non-distended    No rebound tenderness or guarding  MSK:  No midline tenderness or bony step-off    No deformity    Moves all extremities equally and symmetrically; L. Leg dressing removed; wound to L. Lower leg; incisions c/d/i, 2 areas of noted skin breakdown, though no overlying warmth/erythema/fluctuance/purulent drainage  Skin:  No rash or lesions noted. Generalized pallor  Neuro:   No apparent deficit.    Strength 5/5 x4.  Sensation intact x4.     GCS: 15  Psych:  Flat affect, denies suicidal ideations.              Emergency Department Course     ECG:  ECG taken at 1119, ECG read at 1136  Sinus bradycardia with 1st degree AV block  Left axis deviation  Prolonged QT  Abnormal ECG  Rate 57 bpm. IA interval 228 ms. QRS duration 116 ms. QT/QTc 494/480 ms. P-R-T axes 59 -33 41.      Imaging:  Head CT w/o contrast  1. New area of hypoattenuation involving the right occipital lobe,  suspicious for infarct, potentially subacute.  2. No evidence of hemorrhage.  3. Multiple old infarcts.    BRADLEY ROMO MD  Reading per radiology     XR Chest Port 1 View  Subtle new patchy opacities at the left lung base compared  to the prior exam. Cannot exclude developing subtle airspace  disease  including potentially pneumonia. Right lung appears clear. Stable  elevated right hemidiaphragm. Stable cardiac silhouette and  sternotomy.     Reading per radiology    XR Tibia & Fibula Port Left 2 Views  No evidence of tibial or fibular osseous destruction or  fracture. Extensive atherosclerotic calcification is noted throughout  the leg.    MAITE BERNABE MD  Reading per radiology    MRA Neck (Carotids) wo & w Contrast  Pending    MR Brain w/o Constrast  Pending      Laboratory:  CBC: WBC 6.5, HGB 12.8 (L),    CMP: Anion gap 1 (L), Glucose 125 (H), GFR 46 (L), Albumin 3.2 (L) (Creatinine: 1.58 (H)) o/w WNL  Blood Gas venous and oxyhgb: pH 7.32, PCO2 58 (H), PO2 19 (L), Bicarbonate 30 (H), Oxyhgb 21, O2 Sat 96, Room Air    Troponin (Collected 1105): <0.015  BNP: 329  INR: 3.65 (H)  Magnesium: 2.0  CK: 34  Lactic Acid: 1.4  Procalcitonin: pending    Creatinine POCT: Creatinine 1.5 (H), GFR 47 (L)    Blood Culture x2: Pending    Alcohol level blood: <0.01  Acetaminophen level: <2  Salicylate level: <2    UA with micro: GLC 70 (A), Blood small (A), Protein albumin 100 (A), RBC/HPF 7 (H), Bacteria few (A), Mucous present (A) o/w negative  Drug abuse screen 77 urine: Benzodiazepine positive (A)    Stool: Occult blood: Negative    COVID-19 Virus (Coronavirus), PCR Swab: pending      Lab Results   Component Value Date    ABO A 01/22/2021    RH Pos 01/22/2021       Emergency Department Course:    Reviewed:  I reviewed nursing notes, vitals, past medical history and care everywhere    Assessments:  1102 I obtained history and examined the patient as noted above.     Consults:   1328 I consulted Dr. Mcfadden of the hospitalist service. They agree to accept care of the patient.  1339 I consulted ALISSON Hess of stroke neurology.    Interventions:  1119 NS 1000 mL IV  1136 NS 1000 mL IV  1211 Rocephin 1g IV    Disposition:  The patient was admitted to the hospital under the care of Dr. Mcfadden.       Impression &  Plan     Medical Decision Making:  Patient is a 65-year-old male presenting with reported weakness.  He is noted to be profoundly hypotensive on arrival though this up trended during his time in the ED with IV fluids.  No signs of trauma on exam though in the setting of anticoagulation and reported head trauma he did undergo a formal head CT.  This does suggest concerns for possible subacute infarct.  I did review the case with stroke neurology who recommended MRI as well as MRA.  Patient without obvious focal neuro deficits on exam.  The hospitalist will follow her MRI imaging.  Patient is noted to be therapeutic with his anticoagulation, I clinically doubt PE.  EKG without focal ischemia or underlying arrhythmia.  Screening troponin negative, he denies any active chest pain and I doubt ACS.  He is noted to have worsening renal insufficiency and I do have concerns that this is more secondary to prerenal as patient does appear dehydrated on exam.  I also have concerns that his hypotension was likely driven by polypharmacy as patient does have a history of taking Valium as well as trazodone and patient's family reported concerns reportedly that he may be taking too much of these medications.  Patient's left leg wound was assessed, there is no overlying cellulitis though he does have an extensive history of cellulitis and chronic wound infection to this leg. CXR read as possible pneumonia though I am not convinced, patient denies cough/dyspnea. He was, however, given an empiric dose of IV Rocephin, procalcitonin/blood cultures sent.  Lower clinical suspicion for underlying sepsis at this time.  He remained hemodynamically stable was accepted by hospitalist for admission.    Covid-19  Todd S Aschoff was evaluated during a global COVID-19 pandemic, which necessitated consideration that the patient might be at risk for infection with the SARS-CoV-2 virus that causes COVID-19.   Applicable protocols for evaluation were  followed during the patient's care.   COVID-19 was considered as part of the patient's evaluation. The plan for testing is:  a test was obtained during this visit.    Diagnosis:    ICD-10-CM    1. Generalized muscle weakness  M62.81 Acetaminophen level   2. Hypotension, unspecified hypotension type  I95.9    3. Polypharmacy  Z79.899    4. Anemia, unspecified type  D64.9    5. Renal insufficiency  N28.9        Scribe Disclosure:  IAustin, am serving as a scribe at 11:16 AM on 1/22/2021 to document services personally performed by Wilma Bernabe DO based on my observations and the provider's statements to me.     Scribe Disclosure:  Lindsay DUARTE, am serving as a scribe at 11:51 AM on 1/22/2021 to document services personally performed by Wilma Bernabe DO based on my observations and the provider's statements to me.           Wilma Bernabe DO  01/22/21 1425

## 2021-01-22 NOTE — TELEPHONE ENCOUNTER
PCP sent a HC referral stat. This writer has tried to reach a Community Paramedic by phone, did leave 2 message, waiting for a return phone call.     Left a message for spouse advising we are working on getting either home care or a community paramedic to see patient.     Patient might be eligible for HC since it appears he has had a status change.     Patient states he is not able to walk with the wrapping on his leg. Patient reports he has not taken any Lovonex since 1/19/21 in the evening.      Mentioned to patient that another option for having his INR done at home would be to contact [facility] but they may need to pay out of pocket. Patient handed the phone to his spouse to explain     Explained to spouse the above regarding home care and a community paramedic. Spouse stated she did receive message this writer left for her. Per spouse, the boot that was made to go over leg wrapping does not fit, patient is barely able to get out of, spouse also states they can barely get patient to the bathroom.     Per spouse and patient confirmation, patient has not taken any warfarin since hospital discharge 1/13/21. Huddled with Dr Ingram, patient will resume Lovenox tonight, patient will take 7.5 mg warfarin 1/21/21.    Will try to reach Home Care and Community Paramedic 1/22/21 during business hours.    Lucina Andrew, RN, BSN

## 2021-01-22 NOTE — TELEPHONE ENCOUNTER
Cele with [facility] returned call, patient is currently scheduled for follow up on 1/26/20. Updated Cele that patient and spouse reported 1/21/21 that he was barely able to get out of bed or to the bathroom and patient is currently at the ED.  Lucina Andrew, RN, BSN

## 2021-01-22 NOTE — ED NOTES
Assisted patient with MRI checklist. Patient answering appropriately. Also assisted patient with urinal. 200cc urine output.

## 2021-01-22 NOTE — ED NOTES
Bed: ED21  Expected date: 1/22/21  Expected time: 10:40 AM  Means of arrival: Ambulance  Comments:    66yo M fall

## 2021-01-22 NOTE — PHARMACY-ANTICOAGULATION SERVICE
Clinical Pharmacy - Warfarin Dosing Consult     Pharmacy has been consulted to manage this patient s warfarin therapy.  Indication: Mechanical Aortic Valve Replacement  Therapy Goal: INR 2.5-3.5  Warfarin Prior to Admission: Yes  Warfarin PTA Regimen: 7.5 mg Sun & Thur, 5 mg ROW  Significant drug interactions: propafenone, levothyroxine, simvastatin, Effexor  Recent documented change in oral intake/nutrition: Unknown    INR   Date Value Ref Range Status   01/22/2021 3.65 (H) 0.86 - 1.14 Final   01/13/2021 2.01 (H) 0.86 - 1.14 Final       Recommend warfarin 5 mg today.  Pharmacy will monitor Todd S Aschoff daily and order warfarin doses to achieve specified goal.      Please contact pharmacy as soon as possible if the warfarin needs to be held for a procedure or if the warfarin goals change.

## 2021-01-22 NOTE — ED TRIAGE NOTES
Pt arrives to ED he fell out of bed called 911 pt couldn't get up lives at home with family. Pt has been weak since surgery on his left leg recently taking valium and trazodone last med 5 mg valium 10p. Pt a/o x4, ABC's intact.

## 2021-01-22 NOTE — CONSULTS
"    Stroke Telephone Note    I was called by Dr. Wilma Islas on 01/22/21 at regarding patient Nicko S Aschoff. The patient is a 65 year old male with a complicated past medical history including history of aortic dissection status post mechanical valve plus graft, atrial fibrillation, anticoagulated with warfarin. Who presents to the emergency department for evaluation of generalized weakness and reported fall. Patient was reported to fall out of his bed today and hit his head. In the emergency department patient was reported to not have any focal deficits on exam.  Noncontrast CT scan revealed an area of hypoattenuation in the right occipital lobe. INR today 3.65    Impression  65-year-old male with history of atrial fibrillation anticoagulated with warfarin who presents to the emergency department for evaluation after hitting his head. CT scan with findings suspicious for infarct age undetermined.     Recommendations  -MRI brain with and without when able    My recommendations are based on the information provided over the phone by Dr. Wilma Islas in-person providers. They are not intended to replace the clinical judgment of his in-person providers. I was not requested to personally see or examine the patient at this time.    Jimena Lin PA-C   Neurology  To page me or covering stroke neurology team member, click here: [facility]   Choose \"On Call\" tab at top, then search dropdown box for \"Neurology Adult\", select location, press Enter, then look for stroke/neuro ICU/telestroke.         "

## 2021-01-22 NOTE — PHARMACY-ADMISSION MEDICATION HISTORY
Admission medication history interview status for this patient is complete. See [facility] admission navigator for allergy information, prior to admission medications and immunization status.     Medication history interview done via telephone during Covid-19 pandemic, indicate source(s): Patient and Family  Medication history resources (including written lists, pill bottles, clinic record):None  Pharmacy: -    Changes made to PTA medication list:  Added: -  Deleted: -  Changed: -    Actions taken by pharmacist (provider contacted, etc):called spouse for mr     Additional medication history information:None    Medication reconciliation/reorder completed by provider prior to medication history?  no   (Y/N)     For patients on insulin therapy:   Do you use sliding scale insulin based on blood sugars?   What is your pre-meal insulin coverage?    Do you typically eat three meals a day?   How many times do you check your blood glucose per day?   How many episodes of hypoglycemia do you typically have per month?   Do you have a Continuous Glucose Monitor (CGM)?      Prior to Admission medications    Medication Sig Last Dose Taking? Auth Provider   acetaminophen (TYLENOL) 500 MG tablet Take 2 tablets (1,000 mg) by mouth 3 times daily 1/21/2021 at Unknown time Yes Carolina High PA-C   azelastine (ASTELIN) 0.1 % nasal spray USE 2 SPRAYS IN NOSTRIL 2 TIMES DAILY AS NEEDED.  Yes Obdulio Ingram MD   cetirizine (ZYRTEC) 10 MG tablet TAKE ONE TABLET BY MOUTH ONCE DAILY AS NEEDED  Yes Obdulio Ingram MD   cyclobenzaprine (FLEXERIL) 10 MG tablet TAKE ONE TABLET BY MOUTH THREE TIMES DAILY AS NEEDED FOR MUSCLE SPASM.  Yes Emily Taylor APRN CNP   diazepam (VALIUM) 2 MG tablet Take 1 tablet (2 mg) by mouth every 12 hours as needed for pain (try not to use over one daily)  Yes Aurelio Veronica MD   donepezil (ARICEPT) 10 MG tablet Take 2 tablets (20 mg) by mouth At Bedtime Stop if diarrhea or nausea develop 1/21/2021 at Unknown  time Yes Aurelio Veronica MD   enoxaparin ANTICOAGULANT (LOVENOX ANTICOAGULANT) 150 MG/ML syringe Inject 0.9 mLs (135 mg) Subcutaneous every 12 hours until INR > 2.5 1/21/2021 at pm Yes Gold Shay MD   folic acid (FOLVITE) 1 MG tablet Take 5 tablets (5 mg) by mouth daily 1/21/2021 at Unknown time Yes Aurelio Veronica MD   guanFACINE (TENEX) 1 MG tablet Take 1 tablet (1 mg) by mouth At Bedtime 1/21/2021 at Unknown time Yes Obdulio Ingram MD   levothyroxine (SYNTHROID/LEVOTHROID) 150 MCG tablet Take 1 tablet (150 mcg) by mouth daily 1/21/2021 at Unknown time Yes Obdulio Ingram MD   liothyronine (CYTOMEL) 25 MCG tablet Take 1 tablet (25 mcg) by mouth daily 1/21/2021 at Unknown time Yes Aurelio Veronica MD   melatonin 3 MG tablet Take 1 tablet (3 mg) by mouth nightly as needed for sleep  Yes Aurelio Veronica MD   memantine (NAMENDA) 10 MG tablet Take 10 mg by mouth 2 times daily 1/21/2021 at Unknown time Yes Unknown, Entered By History   mirabegron (MYRBETRIQ) 50 MG 24 hr tablet Take 1 tablet (50 mg) by mouth daily 1/21/2021 at Unknown time Yes Obdulio Ingram MD   pregabalin (LYRICA) 100 MG capsule Take 1 capsule (100 mg) by mouth 3 times daily Do not take if sleepy/sedated. 1/21/2021 at Unknown time Yes Obdulio Ingram MD   propafenone (RYTHMOL) 150 MG TABS tablet Take 1 tablet (150 mg) by mouth every 8 hours 1/21/2021 at Unknown time Yes Obdulio Ingram MD   senna-docusate (SENOKOT-S;PERICOLACE) 8.6-50 MG per tablet Take 1 tablet by mouth 2 times daily as needed for constipation  Yes Obdulio Ingram MD   simvastatin (ZOCOR) 40 MG tablet Take 1 tablet (40 mg) by mouth At Bedtime. Need to update cholesterol level before additional refills. 1/21/2021 at Unknown time Yes Obdulio Ingram MD   triamcinolone (KENALOG) 0.1 % external cream Apply topically 2 times daily as needed (rash/itching)  Yes Obdulio Ingram MD   venlafaxine (EFFEXOR-XR) 150 MG 24 hr capsule Take 2 capsules (300 mg) by mouth daily  1/21/2021 at Unknown time Yes Obdulio Ingram MD   warfarin ANTICOAGULANT (COUMADIN) 5 MG tablet Take 1.5 tablets (7.5 mg) every Sun, Thurs; 1 tablet (5 mg) all other days or as directed by INR Clinic. 1/21/2021 at 7.5 mg Yes Obdulio Ingram MD   oxyCODONE (ROXICODONE) 5 MG tablet Take 1 tablet (5 mg) by mouth every 4 hours as needed for breakthrough pain   Carolina High, DA

## 2021-01-22 NOTE — PLAN OF CARE
Pt arrived to floor at 1440 via stretcher from ED. A & O x 4, forgetful. Calm and cooperative. VSS. LS dim. PIV x 2 SL. Pt arrived with bag of home medications. Sent to main pharmacy with pts permission. Plan: Possible MRI later today. Hospitalist following.

## 2021-01-22 NOTE — H&P
[facility]    History and Physical  Hospitalist     Date of Admission:  1/22/2021  Date of Service (when I saw the patient): 01/22/21  Provider: Mathew Mcfadden MD      Chief Complaint   Mental status change    History is obtained from the patient, electronic health record and emergency department physician    History of Present Illness   Todd S Aschoff is a 65 year old male who has a very complicated past medical history as can be seen in the list below.  He has been recently discharged from the hospital after a lengthy hospitalization with left lower extremity infected hematoma that needed incision and drainage done by orthopedic service.  Most notable comorbidities in his past medical history are aortic valve replacement due to aortic dissection, chronic atrial fibrillation, chronic warfarin anticoagulation, chronic pain syndrome and hypothyroidism among others. He presents today to the emergency department with complaint of weakness and family worries about clouding of mentation and confusion.  No nausea, vomiting or diarrhea.  No respiratory symptoms.  No fever.  No recent bleeding documented.  On arrival to the emergency department he was fluid resuscitated because severe hypotension on the exam. He seemed to be dehydrated and his kidney function is declining.  His blood pressure responded quite well to fluid expansion and normalized.  He has Rocephin 1 g IV given empirically for presumed infection.  CT of the head shows possible subacute infarct in the right occipital lobe, stroke team was contacted by ER physician Dr. Bernabe and they recommend MRA of the brain, neck and [place].  He has been admitted to the hospital for further treatment and follow-up.    He underwent a full work-up that included:   CBC that shows white count 6.5, hemoglobin 12.8 and platelet 259.  Chemistry shows creatinine 1.58, glomerular filtration rate 46, but electrolytes are within the normal  limits.  VBG significant for pH 7.32, PCO2 58, PO2 19, bicarbonate 30, cocci hemoglobin 20.1, oxygen saturation 96.  Sample taken at room air.  Troponin IES <0.015  NT proBNP 329.  INR 3.65.  Magnesium 2.0, creatinine kinase 34, lactic acid 1.4 and procalcitonin  COVID-19 PCR test negative.  Blood cultures drawn and sent.  BAL <0.01  Acetaminophen level <2  Salicylate level <2  UA negative for infection.  Fecal occult blood negative.    EKG sinus bradycardia with first-degree AV block.    Imaging studies.  Head CT w/o contrast  1. New area of hypoattenuation involving the right occipital lobe,  suspicious for infarct, potentially subacute.  2. No evidence of hemorrhage.  3. Multiple old infarcts.     XR Chest Port 1 View  Subtle new patchy opacities at the left lung base compared  to the prior exam. Cannot exclude developing subtle airspace disease  including potentially pneumonia. Right lung appears clear. Stable  elevated right hemidiaphragm. Stable cardiac silhouette and  sternotomy.      XR Tibia & Fibula Port Left 2 Views  No evidence of tibial or fibular osseous destruction or  fracture. Extensive atherosclerotic calcification is noted throughout  the leg.     CT of the head without contrast.  IMPRESSION:   1. New area of hypoattenuation involving the right occipital lobe,  suspicious for infarct, potentially subacute.  2. No evidence of hemorrhage.  3. Multiple old infarcts.      Past Medical History    I have reviewed this patient's medical history and updated it with pertinent information if needed.   Past Medical History:   Diagnosis Date     Alcohol dependence (H)      Allergy, unspecified not elsewhere classified     seasonal     Antiplatelet or antithrombotic long-term use     coumadin/lovenox     Aortic valve prosthesis present      Atrial fibrillation (H)      Blood transfusion     after heart surg 1992     BPH (benign prostatic hypertrophy)      Chronic infection     low back wound incision not  healing      Chronic pain     lower back and right leg and left leg     Coagulation disorder (H)     on blood thinners     Dissection of aorta, thoracic (H) 1992    St Jose F aotic valve + arch graft 1992     Headache(784.0)      Heart murmur     aortic valve replaced and arch     History of spinal cord injury      Low back pain      Major depression      Mixed hyperlipidemia      Numbness and tingling     right leg post surg rightt hip/ also left leg since surg     OA (osteoarthritis)     hips     Obesity, unspecified      Prostate infection      Sciatica 2002    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     Unspecified hypothyroidism        Assessment & Plan   Todd S Aschoff is a 65 year old male who has a past medical history of Alcohol dependence, Allergy, Aortic valve prosthesis, Atrial fibrillation, BPH, Chronic pain,  Dissection of aorta, thoracic  (1992), Headache, Heart murmur, History of spinal cord injury, Low back pain, Major depression, Mixed hyperlipidemia, Numbness and tingling, OA (osteoarthritis), Obesity, Sciatica, and hypothyroidism among others.  He was recently discharged after incision and drainage of infected hematoma in the left lower extremity, patient has been recovering at home for that.  He presents with confusion, generalized weakness and very low blood pressure.  Evidence of dehydration on physical exam, acute kidney injury and quick response to volume expansion and fluid resuscitation in the emergency department.  CT of the head has incidentally described a possible subacute infarction in the right occipital lobe.  After consultation with the stroke team, he is admitted to the hospital for further treatment and follow-up.  MR studies of the brain are still pending at the moment of admission.  Patient is alert, oriented, fully engaged in the interview, denying any significant symptoms at the moment and moving all 4 extremities.  No evidence of neurological deficit on exam.      #1.   Acute mental status change, weakness and hypotension.  Unclear etiology at the moment of admission, however given complex polypharmacy due to several comorbidities with new onset of acute kidney injury and possible dehydration, it seems to be possible that his presentation is due to a relative over dose in the setting of poor renal clearance.  The presence of a possible subacute stroke in the right occipital lobe is an important finding that need further investigation.  However based on patient report and gross physical exam there are no neurological deficit in visual fields.  Another important argument here is the quit improvement of mentation and blood pressure after fluid resuscitation.  #2.  Left lower extremity infected hematoma, status post incision and drainage.  There is no evidence of ongoing infection, process of healing seems to be as expected.  Healing is by second intention and there is granulation tissue and black eschar covering the areas involved.  #3.  Mechanical aortic valve/atrial fibrillation on chronic anticoagulation with warfarin.  INR 3.65 on arrival.  #4..  Chronic atrial fibrillation.  Patient is rate controlled on propafenone and anticoagulation at goal.  #5.  Chronic pain syndrome.  On pregabalin and oxycodone at home.  #6.  Hypothyroidism.  Treatment with liothyronine and levothyroxine.  #7.  Depression.  On Effexor and Myrbetriq.  #8.  Dementia.  On Namenda and Aricept.  #9.  Hyperlipidemia.  On simvastatin.  # 10.  Obesity.  #11 Acute Kidney Injury Assessment: Newly Diagnosed  Baseline Creatinine: 0.97 mg/dL  Creatinine increased to:  1.58 mg/dL  Most recent creatinine result:   Creatinine   Date Value Ref Range Status   01/22/2021 1.58 (H) 0.66 - 1.25 mg/dL Final     Cause:  Dehydration and Drugs/Medications  Treatment: IV Fluids and Hold/Adjust Medications      Plan.  Inpatient care.  Remote telemetry.  Pulse oximeter spot checks.  Regular diet or as tolerated.  Out of bed with help  of the nurse.  Hydration with normal saline 100 mill per hour until a.m.  Obtain MRI done of the brain without, MRI of the neck vessels and will [place] without contrast.  Pharmacy to dose warfarin and follow-up INR.  Propafenone 150 mg 1 tablet every 8 hours.  Effexor.  150 mg XR 2 capsules daily.  Myrbetriq.  50 mg 1 tablet daily.  Namenda.  10 mg 1 tablet twice daily.  Aricept.  10 mg 2 tablet at bedtime  Levothyroxine 150 mcg 1 tablet daily  Liothyronine 25 mcg 1 tablet daily  Pregabalin 100 mg 1 capsule 3 times daily.    Code Status   Full Code    Primary Care Physician   Obdulio Ingram MD      Past Surgical History   I have reviewed this patient's surgical history and updated it with pertinent information if needed.  Past Surgical History:   Procedure Laterality Date     AORTIC VALVE REPLACEMENT  1992    St. Jose F's valve     C TOTAL HIP ARTHROPLASTY  2010    Left AMANDA     C TOTAL HIP ARTHROPLASTY  2002    R hip replacement comp's by nerve injury with pain down into R leg, nadya below knee     CATARACT IOL, RT/LT      rt eye only     CHOLECYSTECTOMY, LAPOROSCOPIC  8/10     COLONOSCOPY N/A 1/15/2015    Procedure: COLONOSCOPY;  Surgeon: Johnson Kothari MD;  Location: [facility]     DECOMPRESSION LUMBAR ONE LEVEL  4/3/2013    Procedure: DECOMPRESSION LUMBAR ONE LEVEL;  Open Decompression L3-4 bilateral;  Surgeon: Trvais Coffey MD;  Location: [facility] OR     DECOMPRESSION, FUSION CERVICAL ANTERIOR ONE LEVEL, COMBINED  3/23/2012    Procedure:COMBINED DECOMPRESSION, FUSION CERVICAL ANTERIOR ONE LEVEL; Anterior Cervical Decompression and Fusion C4-6; Surgeon:TRAVIS COFFEY; Location:[facility] OR     EXPLORE SPINE, REMOVE HARDWARE, COMBINED  5/23/2013    Procedure: COMBINED EXPLORE SPINE, REMOVE HARDWARE;  Exploration Lumbar Wound for fluid collection;  Surgeon: Travis Coffey MD;  Location: [facility] OR     FUSION CERVICAL ANTERIOR TWO LEVELS  3/26/2012    Procedure:FUSION CERVICAL ANTERIOR TWO LEVELS; Anterior Cervical  Fusion C4-6, Anterior Cervical  Hematoma Evacuation; Surgeon:TRAVIS COFFEY; Location:[facility] OR     IRRIGATION AND DEBRIDEMENT LOWER EXTREMITY, COMBINED Left 1/10/2021    Procedure: 1.  Irrigation and excisional debridement to muscle left distal medial calf chronic wounds total area measuring 9 cm x 4 cm 2.  Irrigation and excisional debridement to fascia left medial calf chronic hematoma measuring 29 x 6.7 x 4.2 cm 3.  Primary complex wound closure left medial distal calf 9 cm in length;  Surgeon: Rod Kemp MD;  Location: [facility] OR     IRRIGATION AND DEBRIDEMENT SPINE, CLOSE WOUND, COMBINED  3/26/2012    Procedure:COMBINED IRRIGATION AND DEBRIDEMENT SPINE, CLOSE WOUND; Surgeon:TRAVIS COFFEY; Location:[facility] OR     removal of cyst of back   2.5week     TONSILLECTOMY       wisdom teeth[       ZZC NONSPECIFIC PROCEDURE  1992    repair of TAA with graft       Prior to Admission Medications   Prior to Admission Medications   Prescriptions Last Dose Informant Patient Reported? Taking?   acetaminophen (TYLENOL) 500 MG tablet   No No   Sig: Take 2 tablets (1,000 mg) by mouth 3 times daily   azelastine (ASTELIN) 0.1 % nasal spray   No No   Sig: USE 2 SPRAYS IN NOSTRIL 2 TIMES DAILY AS NEEDED.   cetirizine (ZYRTEC) 10 MG tablet   No No   Sig: TAKE ONE TABLET BY MOUTH ONCE DAILY AS NEEDED   cyclobenzaprine (FLEXERIL) 10 MG tablet   No No   Sig: TAKE ONE TABLET BY MOUTH THREE TIMES DAILY AS NEEDED FOR MUSCLE SPASM.   diazepam (VALIUM) 2 MG tablet   No No   Sig: Take 1 tablet (2 mg) by mouth every 12 hours as needed for pain (try not to use over one daily)   donepezil (ARICEPT) 10 MG tablet   No No   Sig: Take 2 tablets (20 mg) by mouth At Bedtime Stop if diarrhea or nausea develop   enoxaparin ANTICOAGULANT (LOVENOX ANTICOAGULANT) 150 MG/ML syringe   No No   Sig: Inject 0.9 mLs (135 mg) Subcutaneous every 12 hours until INR > 2.5   folic acid (FOLVITE) 1 MG tablet   No No   Sig: Take 5 tablets (5 mg) by mouth daily    guanFACINE (TENEX) 1 MG tablet   No No   Sig: Take 1 tablet (1 mg) by mouth At Bedtime   levothyroxine (SYNTHROID/LEVOTHROID) 150 MCG tablet   No No   Sig: Take 1 tablet (150 mcg) by mouth daily   liothyronine (CYTOMEL) 25 MCG tablet   No No   Sig: Take 1 tablet (25 mcg) by mouth daily   melatonin 3 MG tablet   No No   Sig: Take 1 tablet (3 mg) by mouth nightly as needed for sleep   memantine (NAMENDA) 10 MG tablet   No No   Sig: One daily for one week then one twice daily   memantine (NAMENDA) 10 MG tablet   Yes No   Sig: Take 10 mg by mouth 2 times daily   mirabegron (MYRBETRIQ) 50 MG 24 hr tablet   No No   Sig: Take 1 tablet (50 mg) by mouth daily   oxyCODONE (ROXICODONE) 5 MG tablet   No No   Sig: Take 1 tablet (5 mg) by mouth every 4 hours as needed for breakthrough pain   pregabalin (LYRICA) 100 MG capsule   No No   Sig: Take 1 capsule (100 mg) by mouth 3 times daily Do not take if sleepy/sedated.   propafenone (RYTHMOL) 150 MG TABS tablet   No No   Sig: Take 1 tablet (150 mg) by mouth every 8 hours   senna-docusate (SENOKOT-S;PERICOLACE) 8.6-50 MG per tablet   No No   Sig: Take 1 tablet by mouth 2 times daily as needed for constipation   simvastatin (ZOCOR) 40 MG tablet   No No   Sig: Take 1 tablet (40 mg) by mouth At Bedtime. Need to update cholesterol level before additional refills.   triamcinolone (KENALOG) 0.1 % external cream   No No   Sig: Apply topically 2 times daily as needed (rash/itching)   venlafaxine (EFFEXOR-XR) 150 MG 24 hr capsule   No No   Sig: Take 2 capsules (300 mg) by mouth daily   warfarin ANTICOAGULANT (COUMADIN) 5 MG tablet   No No   Sig: Take 1.5 tablets (7.5 mg) every Sun, Thurs; 1 tablet (5 mg) all other days or as directed by INR Clinic.      Facility-Administered Medications: None     Allergies   Allergies   Allergen Reactions     Gabapentin      Severe behavioral disturbances       Social History   I have personally reviewed the social history with the patient showing.  Social  History     Tobacco Use     Smoking status: Never Smoker     Smokeless tobacco: Never Used   Substance Use Topics     Alcohol use: No     Comment: Stopped drinking alcohol ~2009     Family History   I have reviewed this patient's family history and it is not contributory to the admission .       Review of Systems   Except for positive as noted in the HPI, a 12-system Review of Systems was found to be negative.      Physical Exam   Vital Signs with Ranges  Temp:  [96.8  F (36  C)] 96.8  F (36  C)  Pulse:  [55-67] 61  Resp:  [18] 18  BP: ()/(26-79) 104/72  SpO2:  [91 %-100 %] 100 %  0 lbs 0 oz    GEN:  Alert, oriented x 3, appears comfortable, NAD.  HEENT:  Normocephalic/atraumatic, no scleral icterus, no nasal discharge, mouth moist.  CV:  Regular rate and rhythm, no murmur or JVD.  S1 + S2 noted, no S3 or S4.  LUNGS:  Clear to auscultation bilaterally without rales/rhonchi/wheezing/retractions.  Symmetric chest rise on inhalation noted.  ABD:  Active bowel sounds, soft, non-tender/non-distended.  No rebound/guarding/rigidity.  EXT:  No edema or cyanosis.  No joint synovitis noted.  SKIN:  Dry to touch, no exanthems noted in the visualized areas.           Data   I personally reviewed the EKG tracing showing Bradycardia with 1DGAVB.  Results for orders placed or performed during the hospital encounter of 01/22/21 (from the past 24 hour(s))   Comprehensive metabolic panel   Result Value Ref Range    Sodium 137 133 - 144 mmol/L    Potassium 4.3 3.4 - 5.3 mmol/L    Chloride 105 94 - 109 mmol/L    Carbon Dioxide 31 20 - 32 mmol/L    Anion Gap 1 (L) 3 - 14 mmol/L    Glucose 125 (H) 70 - 99 mg/dL    Urea Nitrogen 18 7 - 30 mg/dL    Creatinine 1.58 (H) 0.66 - 1.25 mg/dL    GFR Estimate 46 (L) >60 mL/min/[1.73_m2]    GFR Estimate If Black 53 (L) >60 mL/min/[1.73_m2]    Calcium 9.1 8.5 - 10.1 mg/dL    Bilirubin Total 0.9 0.2 - 1.3 mg/dL    Albumin 3.2 (L) 3.4 - 5.0 g/dL    Protein Total 7.2 6.8 - 8.8 g/dL    Alkaline  Phosphatase 106 40 - 150 U/L    ALT 24 0 - 70 U/L    AST 25 0 - 45 U/L   CBC with platelets differential   Result Value Ref Range    WBC 6.5 4.0 - 11.0 10e9/L    RBC Count 4.03 (L) 4.4 - 5.9 10e12/L    Hemoglobin 12.8 (L) 13.3 - 17.7 g/dL    Hematocrit 40.2 40.0 - 53.0 %     78 - 100 fl    MCH 31.8 26.5 - 33.0 pg    MCHC 31.8 31.5 - 36.5 g/dL    RDW 13.9 10.0 - 15.0 %    Platelet Count 259 150 - 450 10e9/L    Diff Method Automated Method     % Neutrophils 75.7 %    % Lymphocytes 14.2 %    % Monocytes 8.9 %    % Eosinophils 0.6 %    % Basophils 0.3 %    % Immature Granulocytes 0.3 %    Nucleated RBCs 0 0 /100    Absolute Neutrophil 5.0 1.6 - 8.3 10e9/L    Absolute Lymphocytes 0.9 0.8 - 5.3 10e9/L    Absolute Monocytes 0.6 0.0 - 1.3 10e9/L    Absolute Eosinophils 0.0 0.0 - 0.7 10e9/L    Absolute Basophils 0.0 0.0 - 0.2 10e9/L    Abs Immature Granulocytes 0.0 0 - 0.4 10e9/L    Absolute Nucleated RBC 0.0    Troponin I   Result Value Ref Range    Troponin I ES <0.015 0.000 - 0.045 ug/L   INR   Result Value Ref Range    INR 3.65 (H) 0.86 - 1.14   Lactic acid whole blood   Result Value Ref Range    Lactic Acid 1.4 0.7 - 2.0 mmol/L   Salicylate level   Result Value Ref Range    Salicylate Level <2 mg/dL   Alcohol level blood   Result Value Ref Range    Ethanol g/dL <0.01 <0.01 g/dL   CK total   Result Value Ref Range    CK Total 34 30 - 300 U/L   Magnesium   Result Value Ref Range    Magnesium 2.0 1.6 - 2.3 mg/dL   Nt probnp inpatient   Result Value Ref Range    N-Terminal Pro BNP Inpatient 329 0 - 900 pg/mL   ABO/Rh type and screen   Result Value Ref Range    Units Ordered 2     ABO A     RH(D) Pos     Antibody Screen Neg     Test Valid Only At [facility]        Specimen Expires 01/25/2021     Crossmatch Red Blood Cells     Blood Bank Comment       Delay in availability of Red Blood Cells called to  GRAY HERNÁNDEZ (MARIA TERESA) ON 1.22.21 AT 1230 BY AEF     Blood gas venous and oxyhgb   Result Value Ref Range     Ph Venous 7.32 7.32 - 7.43 pH    PCO2 Venous 58 (H) 40 - 50 mm Hg    PO2 Venous 19 (L) 25 - 47 mm Hg    Bicarbonate Venous 30 (H) 21 - 28 mmol/L    FIO2 ROOM AIR     Oxyhemoglobin Venous 21 %    Base Excess Venous 2.3 mmol/L   Stool: occult blood   Result Value Ref Range    Occult Blood Negative NEG^Negative   EKG 12 lead   Result Value Ref Range    Interpretation ECG Click View Image link to view waveform and result    UA with Microscopic   Result Value Ref Range    Color Urine Yellow     Appearance Urine Slightly Cloudy     Glucose Urine 70 (A) NEG^Negative mg/dL    Bilirubin Urine Negative NEG^Negative    Ketones Urine Negative NEG^Negative mg/dL    Specific Gravity Urine 1.021 1.003 - 1.035    Blood Urine Small (A) NEG^Negative    pH Urine 6.0 5.0 - 7.0 pH    Protein Albumin Urine 100 (A) NEG^Negative mg/dL    Urobilinogen mg/dL Normal 0.0 - 2.0 mg/dL    Nitrite Urine Negative NEG^Negative    Leukocyte Esterase Urine Negative NEG^Negative    Source Catheter     WBC Urine 2 0 - 5 /HPF    RBC Urine 7 (H) 0 - 2 /HPF    Bacteria Urine Few (A) NEG^Negative /HPF    Mucous Urine Present (A) NEG^Negative /LPF   Drug abuse screen 77 urine ([place], [facility], [facility])   Result Value Ref Range    Amphetamine Qual Urine Negative NEG^Negative    Barbiturates Qual Urine Negative NEG^Negative    Benzodiazepine Qual Urine Positive (A) NEG^Negative    Cannabinoids Qual Urine Negative NEG^Negative    Cocaine Qual Urine Negative NEG^Negative    Opiates Qualitative Urine Negative NEG^Negative    PCP Qual Urine Negative NEG^Negative   Creatinine POCT   Result Value Ref Range    Creatinine 1.5 (H) 0.66 - 1.25 mg/dL    GFR Estimate 47 (L) >60 mL/min/[1.73_m2]    GFR Estimate If Black 57 (L) >60 mL/min/[1.73_m2]   XR Tibia & Fibula Port Left 2 Views    Narrative    XR TIBIA & FIBULA PORT LT 2 VW 1/22/2021 12:00 PM     HISTORY: WOUND      Impression    IMPRESSION: No evidence of tibial or fibular osseous destruction or  fracture. Extensive  atherosclerotic calcification is noted throughout  the leg.    MAITE BERNABE MD   XR Chest Port 1 View    Narrative    CHEST PORTABLE ONE VIEW   1/22/2021 12:01 PM     HISTORY: Weakness.    COMPARISON: Chest x-ray 3/6/2017.      Impression    IMPRESSION: Subtle new patchy opacities at the left lung base compared  to the prior exam. Cannot exclude developing subtle airspace disease  including potentially pneumonia. Right lung appears clear. Stable  elevated right hemidiaphragm. Stable cardiac silhouette and  sternotomy.     *Note: Due to a large number of results and/or encounters for the requested time period, some results have not been displayed. A complete set of results can be found in Results Review.       Disclaimer: This note consists of symbols derived from keyboarding, dictation and/or voice recognition software. As a result, there may be errors in the script that have gone undetected. Please consider this when interpreting information found in this chart.

## 2021-01-23 ENCOUNTER — APPOINTMENT (OUTPATIENT)
Dept: CARDIOLOGY | Facility: CLINIC | Age: 66
End: 2021-01-23
Attending: NURSE PRACTITIONER
Payer: COMMERCIAL

## 2021-01-23 LAB
ANION GAP SERPL CALCULATED.3IONS-SCNC: 2 MMOL/L (ref 3–14)
BUN SERPL-MCNC: 21 MG/DL (ref 7–30)
CALCIUM SERPL-MCNC: 8.4 MG/DL (ref 8.5–10.1)
CHLORIDE SERPL-SCNC: 108 MMOL/L (ref 94–109)
CHOLEST SERPL-MCNC: 135 MG/DL
CO2 SERPL-SCNC: 29 MMOL/L (ref 20–32)
CREAT SERPL-MCNC: 1.62 MG/DL (ref 0.66–1.25)
GFR SERPL CREATININE-BSD FRML MDRD: 44 ML/MIN/{1.73_M2}
GLUCOSE SERPL-MCNC: 90 MG/DL (ref 70–99)
HBA1C MFR BLD: 4.9 % (ref 0–5.6)
HDLC SERPL-MCNC: 44 MG/DL
INR PPP: 6.9 (ref 0.86–1.14)
LDLC SERPL CALC-MCNC: 65 MG/DL
NONHDLC SERPL-MCNC: 91 MG/DL
OSMOLALITY SERPL: 292 MMOL/KG (ref 280–301)
POTASSIUM SERPL-SCNC: 3.9 MMOL/L (ref 3.4–5.3)
SODIUM SERPL-SCNC: 139 MMOL/L (ref 133–144)
TRIGL SERPL-MCNC: 130 MG/DL

## 2021-01-23 PROCEDURE — 258N000003 HC RX IP 258 OP 636: Performed by: HOSPITALIST

## 2021-01-23 PROCEDURE — 250N000013 HC RX MED GY IP 250 OP 250 PS 637: Performed by: HOSPITALIST

## 2021-01-23 PROCEDURE — 80048 BASIC METABOLIC PNL TOTAL CA: CPT | Performed by: HOSPITALIST

## 2021-01-23 PROCEDURE — 999N000208 ECHOCARDIOGRAM COMPLETE

## 2021-01-23 PROCEDURE — G0426 INPT/ED TELECONSULT50: HCPCS | Mod: G0 | Performed by: NURSE PRACTITIONER

## 2021-01-23 PROCEDURE — 83930 ASSAY OF BLOOD OSMOLALITY: CPT | Performed by: HOSPITALIST

## 2021-01-23 PROCEDURE — 120N000001 HC R&B MED SURG/OB

## 2021-01-23 PROCEDURE — 255N000002 HC RX 255 OP 636: Performed by: HOSPITALIST

## 2021-01-23 PROCEDURE — 83036 HEMOGLOBIN GLYCOSYLATED A1C: CPT | Performed by: HOSPITALIST

## 2021-01-23 PROCEDURE — 258N000001 HC RX 258: Performed by: HOSPITALIST

## 2021-01-23 PROCEDURE — 36415 COLL VENOUS BLD VENIPUNCTURE: CPT | Performed by: HOSPITALIST

## 2021-01-23 PROCEDURE — 80061 LIPID PANEL: CPT | Performed by: HOSPITALIST

## 2021-01-23 PROCEDURE — 99233 SBSQ HOSP IP/OBS HIGH 50: CPT | Performed by: HOSPITALIST

## 2021-01-23 PROCEDURE — 93306 TTE W/DOPPLER COMPLETE: CPT | Mod: 26 | Performed by: INTERNAL MEDICINE

## 2021-01-23 PROCEDURE — 85610 PROTHROMBIN TIME: CPT | Performed by: HOSPITALIST

## 2021-01-23 RX ORDER — SODIUM CHLORIDE 9 MG/ML
INJECTION, SOLUTION INTRAVENOUS CONTINUOUS
Status: DISCONTINUED | OUTPATIENT
Start: 2021-01-23 | End: 2021-01-24

## 2021-01-23 RX ORDER — ACYCLOVIR 200 MG/1
30 CAPSULE ORAL ONCE
Status: COMPLETED | OUTPATIENT
Start: 2021-01-23 | End: 2021-01-23

## 2021-01-23 RX ADMIN — ACETAMINOPHEN 1000 MG: 500 TABLET, FILM COATED ORAL at 14:29

## 2021-01-23 RX ADMIN — SODIUM CHLORIDE: 9 INJECTION, SOLUTION INTRAVENOUS at 21:50

## 2021-01-23 RX ADMIN — SODIUM CHLORIDE: 9 INJECTION, SOLUTION INTRAVENOUS at 14:29

## 2021-01-23 RX ADMIN — PROPAFENONE HYDROCHLORIDE 150 MG: 150 TABLET, FILM COATED ORAL at 21:49

## 2021-01-23 RX ADMIN — PROPAFENONE HYDROCHLORIDE 150 MG: 150 TABLET, FILM COATED ORAL at 05:23

## 2021-01-23 RX ADMIN — ACETAMINOPHEN 1000 MG: 500 TABLET, FILM COATED ORAL at 20:12

## 2021-01-23 RX ADMIN — DONEPEZIL HYDROCHLORIDE 20 MG: 10 TABLET ORAL at 21:48

## 2021-01-23 RX ADMIN — SODIUM CHLORIDE 30 ML: 9 INJECTION, SOLUTION INTRAMUSCULAR; INTRAVENOUS; SUBCUTANEOUS at 14:32

## 2021-01-23 RX ADMIN — ACETAMINOPHEN 1000 MG: 500 TABLET, FILM COATED ORAL at 08:39

## 2021-01-23 RX ADMIN — PREGABALIN 100 MG: 100 CAPSULE ORAL at 08:39

## 2021-01-23 RX ADMIN — MEMANTINE 10 MG: 5 TABLET ORAL at 20:12

## 2021-01-23 RX ADMIN — LEVOTHYROXINE SODIUM 150 MCG: 150 TABLET ORAL at 06:39

## 2021-01-23 RX ADMIN — PREGABALIN 100 MG: 100 CAPSULE ORAL at 20:12

## 2021-01-23 RX ADMIN — MEMANTINE 10 MG: 5 TABLET ORAL at 08:39

## 2021-01-23 RX ADMIN — FOLIC ACID 5 MG: 1 TABLET ORAL at 08:39

## 2021-01-23 RX ADMIN — LIOTHYRONINE SODIUM 25 MCG: 25 TABLET ORAL at 06:39

## 2021-01-23 RX ADMIN — PREGABALIN 100 MG: 100 CAPSULE ORAL at 14:30

## 2021-01-23 RX ADMIN — PROPAFENONE HYDROCHLORIDE 150 MG: 150 TABLET, FILM COATED ORAL at 14:29

## 2021-01-23 RX ADMIN — SODIUM CHLORIDE: 9 INJECTION, SOLUTION INTRAVENOUS at 07:36

## 2021-01-23 RX ADMIN — HUMAN ALBUMIN MICROSPHERES AND PERFLUTREN 3 ML: 10; .22 INJECTION, SOLUTION INTRAVENOUS at 15:00

## 2021-01-23 RX ADMIN — SIMVASTATIN 40 MG: 40 TABLET, FILM COATED ORAL at 21:48

## 2021-01-23 ASSESSMENT — ACTIVITIES OF DAILY LIVING (ADL)
ADLS_ACUITY_SCORE: 19
ADLS_ACUITY_SCORE: 20
DEPENDENT_IADLS:: CLEANING;COOKING;LAUNDRY;SHOPPING;MEAL PREPARATION;TRANSPORTATION
ADLS_ACUITY_SCORE: 20
ADLS_ACUITY_SCORE: 19

## 2021-01-23 NOTE — UTILIZATION REVIEW
Admission Status; Secondary Review Determination     Under the authority of the Utilization Management Commitee, the utilization review process indicated a secondary review on the above patient. The review outcome is based on review of the medical records, discussions with staff, and applying clinical experience noted on the date of the review.     (x) Inpatient Status Appropriate - This patient's medical care is consistent with medical management for inpatient care and reasonable inpatient medical practice.     RATIONALE FOR DETERMINATION:    65 year old male who has a past medical history of Alcohol dependence, Allergy, Aortic valve prosthesis, Atrial fibrillation, BPH, Chronic pain,  Dissection of aorta, thoracic  (1992), Headache, Heart murmur, History of spinal cord injury, Low back pain, Major depression, Mixed hyperlipidemia, Numbness and tingling, OA (osteoarthritis), Obesity, Sciatica, and hypothyroidism among others.  He was recently discharged after incision and drainage of infected hematoma in the left lower extremity, patient has been recovering at home for that.  He presented with confusion, generalized weakness and hypotension with recorded SBP in the 80s.  He had evidence of dehydration on physical exam and acute kidney injury with creatinine near 1.6.  CT of the head has incidentally described a possible subacute infarction in the right occipital lobe; this was followed up with a brain MRI that shows acute/subacute infarct of the  right middle frontal gyrus region.  Labs today notable for significant elevation of his INR to 7 and worsening creatinine to 1.6.  It is estimated he will require another 2 days in the hospital.  Neurology service has been consulted to assist with work up of acute CVA.    Given his multiple medical problems in this complex patient with several medical issues increasing the complexity of his care including encephalopathy, ARF with worsening creatinine, acute CVA inpatient  status appears appropriate.      At the time of admission with the information available to the attending physician more than 2 nights Hospital complex care was anticipated, based on patient risk of adverse outcome if treated as outpatient and complex care required. Inpatient admission is appropriate based on the Medicare guidelines.    The information on this document is developed by the utilization review team in order for the business office to ensure compliance. This only denotes the appropriateness of proper admission status and does not reflect the quality of care rendered.   The definitions of Inpatient Status and Observation Status used in making the determination above are those provided in the CMS Coverage Manual, Chapter 1 and Chapter 6, section 70.4.     Sincerely,     Tarun Rodriguez MD  Utilization Review   Physician Advisor   [facility]

## 2021-01-23 NOTE — CONSULTS
[facility]    Stroke Consult Note    Reason for Consult:  CVA    Chief Complaint: Generalized Weakness       HPI  Todd S Aschoff is a 65 year old male with complicated past medical history including history of aortic dissection s/p mechanical aortic valve, atrial fibrillation (on Coumadin), dementia, recent fall and resulting LLE hematoma, and CHARISSA who was brought to the [facility] ED 1/22 for evaluation of generalized weakness and altered mental status. The patient cannot recall the events leading up to hospitalization, so I spoke with his wife for more information. She reports that he was initially not making sense when talking to her about his medications. A short time later, she reports that she heard a sound from upstairs and he was on the floor with his right arm shaking (her children have had seizures and she reports this looked similar to those events). His eyes appeared glazed over and he was not responding to her. After this, he was confused and not responding appropriately. On arrival BP was 80s/60s, which improved with fluids.     CTH was obtained which demonstrated an age-indeterminate right occipital infarct. A follow-up MRI brain showed an acute infarct in the right frontal vertex and multiple chronic infarcts in the right occipital lobe and bilateral cerebellar hemispheres. MRA shows possible diffuse stenosis of the left PCA.     Stroke Evaluation summarized:  MRI/Head CT: MRI brain as above  Intracranial Vascular Imaging: MRA brain with possible diffuse stenosis of the left PCA  Cervical Carotid and Vertebral Artery Vascular Imaging: MRA neck without significant stenosis  Echocardiogram: Awaiting  EKG/Telemetry: Sinus bradycardia with 1st degree AVB  LDL: 65  A1c: 4.9  Troponin: not tested   Other testing: Not Applicable    Impression  # Acute right frontal infarct, suspect this was a silent infarct as there are no reports of focal neurologic deficits    # Multiple chronic  "infarcts    # Episode of right arm shaking and unresponsiveness followed by confusion, concern for possible seizure    Recommendations  - Awaiting TTE, will follow and update recommendations as indicated.   - Blood cultures NGTD. If these become positive, would recommend CARMEN for evaluation of endocarditis  - Continue Coumadin  - LDL 65, within goal 40-70, continue PTA Simvastatin 40 mg daily  - A1c within goal <7.0  - Reports that BP has been labile recently. Discussed that he should acquire a BP cuff and record AM and PM readings. Goal BP <140/90 with tighter control associated with decreased overall CV risk, if tolerated  - Therapies, as needed  - General neurology consult for further evaluation of episode of right arm shaking and AMS.    Patient Follow-up    - in the next 1-2 week(s) with PCP   - Hospital follow up for stroke: schedule with Any stroke THOR in 8 weeks at [facility] ([phone]). Referral placed in discharge orders: note, referral states \"neurosurgery\" but it is for stroke clinic.    Thank you for this consult. No further stroke evaluation is recommended, so we will sign off. Please contact us with any additional questions.    AMARIS Osorio, CNP  Neurology  To page me or covering stroke neurology team member, click here: AMCOM   Choose \"On Call\" tab at top, then search dropdown box for \"Neurology Adult\", select location, press Enter, then look for stroke/neuro ICU/telestroke.    _____________________________________________________    Past Medical History   Past Medical History:   Diagnosis Date     Alcohol dependence (H)      Allergy, unspecified not elsewhere classified     seasonal     Antiplatelet or antithrombotic long-term use     coumadin/lovenox     Aortic valve prosthesis present      Atrial fibrillation (H)      Blood transfusion     after heart surg 1992     BPH (benign prostatic hypertrophy)      Chronic infection     low back wound incision not healing      " Chronic pain     lower back and right leg and left leg     Coagulation disorder (H)     on blood thinners     Dissection of aorta, thoracic (H) 1992    [facility] Jose F aotic valve + arch graft 1992     Headache(784.0)      Heart murmur     aortic valve replaced and arch     History of spinal cord injury      Low back pain      Major depression      Mixed hyperlipidemia      Numbness and tingling     right leg post surg rightt hip/ also left leg since surg     OA (osteoarthritis)     hips     Obesity, unspecified      Prostate infection      Sciatica 2002    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 F's valve     C TOTAL HIP ARTHROPLASTY  2010    Left MAANDA     C TOTAL HIP ARTHROPLASTY  2002    R hip replacement comp's by nerve injury with pain down into R leg, nadya below knee     CATARACT IOL, RT/LT      rt eye only     CHOLECYSTECTOMY, LAPOROSCOPIC  8/10     COLONOSCOPY N/A 1/15/2015    Procedure: COLONOSCOPY;  Surgeon: Johnson Kothari MD;  Location: [facility]     DECOMPRESSION LUMBAR ONE LEVEL  4/3/2013    Procedure: DECOMPRESSION LUMBAR ONE LEVEL;  Open Decompression L3-4 bilateral;  Surgeon: Travis Coffey MD;  Location: [facility] OR     DECOMPRESSION, FUSION CERVICAL ANTERIOR ONE LEVEL, COMBINED  3/23/2012    Procedure:COMBINED DECOMPRESSION, FUSION CERVICAL ANTERIOR ONE LEVEL; Anterior Cervical Decompression and Fusion C4-6; Surgeon:TRAVIS COFFEY; Location:[facility] OR     EXPLORE SPINE, REMOVE HARDWARE, COMBINED  5/23/2013    Procedure: COMBINED EXPLORE SPINE, REMOVE HARDWARE;  Exploration Lumbar Wound for fluid collection;  Surgeon: Travis Coffey MD;  Location: [facility] OR     FUSION CERVICAL ANTERIOR TWO LEVELS  3/26/2012    Procedure:FUSION CERVICAL ANTERIOR TWO LEVELS; Anterior Cervical Fusion C4-6, Anterior Cervical  Hematoma Evacuation; Surgeon:ALEXX  TRAVIS TUCKER; Location:[facility] OR     IRRIGATION AND DEBRIDEMENT LOWER EXTREMITY, COMBINED Left 1/10/2021    Procedure: 1.  Irrigation and excisional debridement to muscle left distal medial calf chronic wounds total area measuring 9 cm x 4 cm 2.  Irrigation and excisional debridement to fascia left medial calf chronic hematoma measuring 29 x 6.7 x 4.2 cm 3.  Primary complex wound closure left medial distal calf 9 cm in length;  Surgeon: Rod Kemp MD;  Location: [facility] OR     IRRIGATION AND DEBRIDEMENT SPINE, CLOSE WOUND, COMBINED  3/26/2012    Procedure:COMBINED IRRIGATION AND DEBRIDEMENT SPINE, CLOSE WOUND; Surgeon:TRAVIS COFFEY; Location:[facility] OR     removal of cyst of back   2.5week     TONSILLECTOMY       wisdom teeth[       ZZC NONSPECIFIC PROCEDURE  1992    repair of TAA with graft     Medications   Home Meds  Prior to Admission medications    Medication Sig Start Date End Date Taking? Authorizing Provider   acetaminophen (TYLENOL) 500 MG tablet Take 2 tablets (1,000 mg) by mouth 3 times daily 1/11/21  Yes Carolina High PA-C   azelastine (ASTELIN) 0.1 % nasal spray USE 2 SPRAYS IN NOSTRIL 2 TIMES DAILY AS NEEDED. 5/29/20  Yes Obdulio Ingram MD   cetirizine (ZYRTEC) 10 MG tablet TAKE ONE TABLET BY MOUTH ONCE DAILY AS NEEDED 5/29/20  Yes Obdulio Ingram MD   cyclobenzaprine (FLEXERIL) 10 MG tablet TAKE ONE TABLET BY MOUTH THREE TIMES DAILY AS NEEDED FOR MUSCLE SPASM. 9/9/20  Yes Emily Taylor APRN CNP   diazepam (VALIUM) 2 MG tablet Take 1 tablet (2 mg) by mouth every 12 hours as needed for pain (try not to use over one daily) 12/2/20  Yes Aurelio Veronica MD   donepezil (ARICEPT) 10 MG tablet Take 2 tablets (20 mg) by mouth At Bedtime Stop if diarrhea or nausea develop 12/2/20  Yes Aurelio Veronica MD   enoxaparin ANTICOAGULANT (LOVENOX ANTICOAGULANT) 150 MG/ML syringe Inject 0.9 mLs (135 mg) Subcutaneous every 12 hours until INR > 2.5 1/13/21  Yes Gold Shay MD   folic acid (FOLVITE) 1 MG  tablet Take 5 tablets (5 mg) by mouth daily 12/3/20  Yes Aurelio Veronica MD   guanFACINE (TENEX) 1 MG tablet Take 1 tablet (1 mg) by mouth At Bedtime 12/11/20  Yes Obdulio Ingram MD   levothyroxine (SYNTHROID/LEVOTHROID) 150 MCG tablet Take 1 tablet (150 mcg) by mouth daily 12/11/20  Yes Obdulio Ingram MD   liothyronine (CYTOMEL) 25 MCG tablet Take 1 tablet (25 mcg) by mouth daily 12/3/20  Yes Aurelio Veronica MD   melatonin 3 MG tablet Take 1 tablet (3 mg) by mouth nightly as needed for sleep 12/2/20  Yes Aurelio Veronica MD   memantine (NAMENDA) 10 MG tablet Take 10 mg by mouth 2 times daily   Yes Unknown, Entered By History   mirabegron (MYRBETRIQ) 50 MG 24 hr tablet Take 1 tablet (50 mg) by mouth daily 12/31/20  Yes Obdulio Ingram MD   pregabalin (LYRICA) 100 MG capsule Take 1 capsule (100 mg) by mouth 3 times daily Do not take if sleepy/sedated. 12/11/20  Yes Obdulio Ingram MD   propafenone (RYTHMOL) 150 MG TABS tablet Take 1 tablet (150 mg) by mouth every 8 hours 12/10/20  Yes Obdulio Ingram MD   senna-docusate (SENOKOT-S;PERICOLACE) 8.6-50 MG per tablet Take 1 tablet by mouth 2 times daily as needed for constipation 8/2/18  Yes Obdulio Ingram MD   simvastatin (ZOCOR) 40 MG tablet Take 1 tablet (40 mg) by mouth At Bedtime. Need to update cholesterol level before additional refills. 12/7/20  Yes Obdulio Ingram MD   triamcinolone (KENALOG) 0.1 % external cream Apply topically 2 times daily as needed (rash/itching) 5/29/20  Yes Obduloi Ingram MD   venlafaxine (EFFEXOR-XR) 150 MG 24 hr capsule Take 2 capsules (300 mg) by mouth daily 1/20/21  Yes Obdulio Ingram MD   warfarin ANTICOAGULANT (COUMADIN) 5 MG tablet Take 1.5 tablets (7.5 mg) every Sun, Thurs; 1 tablet (5 mg) all other days or as directed by INR Clinic. 1/14/21  Yes Obdulio Ingram MD   oxyCODONE (ROXICODONE) 5 MG tablet Take 1 tablet (5 mg) by mouth every 4 hours as needed for breakthrough pain 1/11/21   Carolina High, DA        Scheduled Meds    acetaminophen  1,000 mg Oral TID     donepezil  20 mg Oral At Bedtime     folic acid  5 mg Oral Daily     guanFACINE  1 mg Oral At Bedtime     levothyroxine  150 mcg Oral Daily     liothyronine  25 mcg Oral Daily     memantine  10 mg Oral BID     pregabalin  100 mg Oral TID     propafenone  150 mg Oral Q8H     simvastatin  40 mg Oral At Bedtime     sodium chloride (PF)  3 mL Intracatheter Q8H     warfarin-No DOSE today  1 each Does not apply no dose today (warfarin)       Infusion Meds    - MEDICATION INSTRUCTIONS -       sodium chloride       Warfarin Therapy Reminder         PRN Meds  azelastine, cetirizine, lidocaine 4%, lidocaine (buffered or not buffered), melatonin, - MEDICATION INSTRUCTIONS -, senna-docusate, sodium chloride (PF), Warfarin Therapy Reminder    Allergies   Allergies   Allergen Reactions     Gabapentin      Severe behavioral disturbances     Family History   Family History   Problem Relation Age of Onset     Cerebrovascular Disease Father          age 86, had M.I. ,diabetic also     Prostate Cancer Father      Cancer Mother          age 83 of dementia, also h/o lymphoma     Hypertension Brother         2 brothers with hypertension & sleep apnea     Hypertension Sister         Born ~1936     Sleep Apnea Brother          age 78, Alzheimers     No Known Problems Son      No Known Problems Daughter      No Known Problems Son      Family History Negative Brother         Had 5 brothers, three still alive as of 2019     Colon Cancer No family hx of      Social History   Social History     Tobacco Use     Smoking status: Current Every Day Smoker     Packs/day: 0.00     Years: 3.00     Pack years: 0.00     Types: Clove cigarettes or kreteks, Pipe, Cigars     Smokeless tobacco: Never Used   Substance Use Topics     Alcohol use: No     Comment: Stopped drinking alcohol ~     Drug use: No       Review of Systems   The 10 point Review of Systems is negative other than  noted in the HPI or here.        PHYSICAL EXAMINATION   Temp:  [97  F (36.1  C)-97.8  F (36.6  C)] 97.8  F (36.6  C)  Pulse:  [60-69] 67  Resp:  [16-18] 16  BP: ()/(55-82) 121/71  SpO2:  [99 %-100 %] 99 %    Neurologic  Mental Status:  alert, oriented x 3, follows commands, speech clear and fluent, naming and repetition normal  Cranial Nerves:  visual fields intact (tested by nurse), EOMI with normal smooth pursuit, facial sensation intact and symmetric (tested by nurse), facial movements symmetric, hearing not formally tested but intact to conversation, no dysarthria, shoulder shrug equal bilaterally, tongue protrusion midline  Motor:  no abnormal movements, able to move all limbs antigravity spontaneously with no signs of hemiparesis observed, no pronator drift  Reflexes:  unable to test (telestroke)  Sensory:  light touch sensation intact and symmetric throughout upper and lower extremities (assessed by nurse), no extinction on double simultaneous stimulation (assessed by nurse)  Coordination:  normal finger-to-nose and heel-to-shin bilaterally without dysmetria  Station/Gait:  unable to test due to telestroke    Dysphagia Screen  Per Nursing    Stroke Scales    NIHSS  Interval     Interval Comments     1a. Level of Consciousness 0-->Alert, keenly responsive   1b. LOC Questions 0-->Answers both questions correctly   1c. LOC Commands 0-->Performs both tasks correctly   2.   Best Gaze 0-->Normal   3.   Visual 0-->No visual loss   4.   Facial Palsy 0-->Normal symmetrical movements   5a. Motor Arm, Left 0-->No drift, limb holds 90 (or 45) degrees for full 10 secs   5b. Motor Arm, Right 0-->No drift, limb holds 90 (or 45) degrees for full 10 secs   6a. Motor Leg, Left 0-->No drift, leg holds 30 degree position for full 5 secs   6b. Motor Leg, right 0-->No drift, leg holds 30 degree position for full 5 secs   7.   Limb Ataxia 0-->Absent   8.   Sensory 0-->Normal, no sensory loss   9.   Best Language 0-->No  aphasia, normal   10. Dysarthria 0-->Normal   11. Extinction and Inattention  0-->No abnormality   Total 0 (01/23/21 1554)       Imaging  I personally reviewed all imaging; relevant findings per HPI.    Labs Data   CBC  Recent Labs   Lab 01/22/21  1105   WBC 6.5   RBC 4.03*   HGB 12.8*   HCT 40.2        Basic Metabolic Panel   Recent Labs   Lab 01/23/21  0748 01/22/21  1105    137   POTASSIUM 3.9 4.3   CHLORIDE 108 105   CO2 29 31   BUN 21 18   CR 1.62* 1.58*   GLC 90 125*   CATERINA 8.4* 9.1     Liver Panel  Recent Labs   Lab 01/22/21  1105   PROTTOTAL 7.2   ALBUMIN 3.2*   BILITOTAL 0.9   ALKPHOS 106   AST 25   ALT 24     INR    Recent Labs   Lab Test 01/23/21  0748 01/22/21  1105 01/13/21  0709   INR 6.90* 3.65* 2.01*      Lipid Profile    Recent Labs   Lab Test 01/23/21  0748 12/11/20  1545 10/29/19  1024 12/24/14  0846 12/24/14  0846 12/17/13  1012   CHOL 135 226* 200*   < > 166 170   HDL 44 39* 51   < > 41 30*   LDL 65 149* 114*   < > 78 74   TRIG 130 192* 174*   < > 234* 331*   CHOLHDLRATIO  --   --   --   --  4.0 5.7*    < > = values in this interval not displayed.     A1C    Recent Labs   Lab Test 01/23/21  0747   A1C 4.9     Troponin I    Recent Labs   Lab 01/22/21  1105   TROPI <0.015          Stroke Code / Stroke Consult Data Data Telestroke Service Details  (for non-emergent stroke consult with tele)  Video start time 01/23/21   1116   Video end time 01/23/21   1146   Type of service telemedicine diagnostic assessment of acute neurological changes   Reason telemedicine is appropriate patient requires assessment with a specialist for diagnosis and treatment of neurological symptoms   Mode of transmission secure interactive audio and video communication per Lynda   Originating site (patient location) [facility]    Distant site (provider location) [facility]       I have personally spent a total of 50 minutes providing care and consulting with this patient's  medical providers today, with more than 50% of this time spent in consultation, coordination of care, and discussion with the patient and/or family regarding diagnostic results, prognosis, symptom management, risks and benefits of management options, and development of plan of care.

## 2021-01-23 NOTE — PLAN OF CARE
Pt is A&Ox4, Ax2, GB and walker to transfer, NSR on tele HR- 64. VSS. Scheduled tylenol and ice effective for pain control. LS clear bilaterally, abdomen soft and non-tender to touch, passing flatus. Pt has wounds on left shin from an I&D from previous hospitalization. 3 large dry scabs present with small amount of serosanguineous drainage. New clean dressing applied. Left leg elevated. Pt has a good appetite and is drinking adequate amounts of fluids. LR infusing at 100 ml/hr. Will continue to monitor.

## 2021-01-23 NOTE — PROGRESS NOTES
SPIRITUAL HEALTH SERVICES Progress Note  [facility] 6    Spiritual Health Services consult order for assistance with healthcare directive.  Provided informational resources, education related to directive and assistance in completing document.  Advised pt, if here on Monday, may request assistance with notary via IPad.  Pt would like to review documents with healthcare agent (spouse and daughter) and will request follow up as needed.    Plan: Spiritual Health Services remains available for additional emotional/spiritual support.    Kranthi Ennis MA  Staff   Pager: [phone]  Phone: [phone]

## 2021-01-23 NOTE — PROVIDER NOTIFICATION
DATE:  1/23/2021   TIME OF RECEIPT FROM LAB:  0815  LAB TEST:  INR  LAB VALUE:  6.90  RESULTS GIVEN WITH READ-BACK TO (PROVIDER):  Dr. Mcfadden text paged result.  TIME LAB VALUE REPORTED TO PROVIDER:   0816    Provider rounded in person 0820. Provider notes that wafarin was given to patient last eves, see MAR. No interventions ordered.

## 2021-01-23 NOTE — PROGRESS NOTES
[facility]    Hospitalist Progress Note    Date of Service (when I saw the patient): 01/23/2021  Provider:  Mathew Mcfadden MD   Text Page  7am - 6PM       Assessment & Plan   Todd S Aschoff is a 65 year old male who has a past medical history of Alcohol dependence, Allergy, Aortic valve prosthesis, Atrial fibrillation, BPH, Chronic pain,  Dissection of aorta, thoracic  (1992), Headache, Heart murmur, History of spinal cord injury, Low back pain, Major depression, Mixed hyperlipidemia, Numbness and tingling, OA (osteoarthritis), Obesity, Sciatica, and hypothyroidism among others.  He was recently discharged after incision and drainage of infected hematoma in the left lower extremity, patient has been recovering at home for that.  He presents with confusion, generalized weakness and very low blood pressure.  Evidence of dehydration on physical exam, acute kidney injury and quick response to volume expansion and fluid resuscitation in the emergency department.  CT of the head has incidentally described a possible subacute infarction in the right occipital lobe.  After consultation with the stroke team, he is admitted to the hospital for further treatment and follow-up.  MR studies of the brain are still pending at the moment of admission.  Patient is alert, oriented, fully engaged in the interview, denying any significant symptoms at the moment and moving all 4 extremities.  No evidence of neurological deficit on exam. All my information gathered from Dr Mccann.      #1.  Acute mental status change, weakness and hypotension.  Unclear etiology at the moment of admission, however given complex polypharmacy due to several comorbidities with new onset of acute kidney injury and possible dehydration, it seems to be possible that his presentation is due to a relative over dose in the setting of poor renal clearance.  The presence of a possible subacute stroke in the right occipital lobe is an  important finding that need further investigation.  However based on patient report and gross physical exam there are no neurological deficit in visual fields.  Another important finding here is the quit improvement of mentation and blood pressure after fluid resuscitation.  Today wife has conveyed to Neurology PA that when she found him at home he had seizure - like activity with right arm repetitive movements, and unresponsiveness/confusion after the event.  Consult with General Neurology requested.  MR studies yesterday revealed Acute/subacute infarction in right frontal vertex. Stroke team consulted, will complete study with Echocardiograma, no change to current medications.  In case of fever or positive BC he should have CARMEN.   #2.  Left lower extremity infected hematoma, status post incision and drainage.  There is no evidence of ongoing infection . Orthopedic consult for assessment and management has been requested.  #3.  Mechanical aortic valve/atrial fibrillation on chronic anticoagulation with warfarin.  INR 3.65 on arrival. Unfortunately he had Warfarin 5 mg yesterday per pharmacy order and INR is 6.9 today. NO evidence of bleeding, pharmacy to follow.   #4..  Chronic atrial fibrillation.  Patient is rate controlled on propafenone and anticoagulation at goal.  #5.  Chronic pain syndrome.  On pregabalin and oxycodone.  #6.  Hypothyroidism.  Treatment with liothyronine and levothyroxine.  #7.  Depression.  On Effexor and Myrbetriq.  #8.  Dementia.  On Namenda and Aricept.  #9.  Hyperlipidemia.  On simvastatin.  # 10.  Obesity.  #11 Acute Kidney Injury Assessment: Newly Diagnosed  Baseline Creatinine: 0.97 mg/dL  Creatinine increased to:  1.58 mg/dL  Most recent creatinine result:    Creatinine   Date Value Ref Range Status   01/23/2021 1.62 (H) 0.66 - 1.25 mg/dL Final     Cause:  Dehydration and Drugs/Medications  Treatment: IV Fluids and Hold/Adjust Medications  Checking FeNA & Osm      Plan.  Inpatient  care.  Remote telemetry.  Pulse oximeter spot checks.  Regular diet or as tolerated.  Out of bed with help of the nurse.  Hydration with normal saline 100 ml/hour until a.m.  Pharmacy to dose warfarin and follow-up INR.  Propafenone 150 mg 1 tablet every 8 hours.  Effexor.  150 mg XR 2 capsules daily.  Myrbetriq.  50 mg 1 tablet daily.  Namenda.  10 mg 1 tablet twice daily.  Aricept.  10 mg 2 tablet at bedtime  Levothyroxine 150 mcg 1 tablet daily  Liothyronine 25 mcg 1 tablet daily  Pregabalin 100 mg 1 capsule 3 times daily.  Simvastatin   General Neurology consult.      DVT Prophylaxis: Warfarin  Code Status: Full Code    Disposition: Expected discharge in 2 days once stable.    Interval History   He feels better today. No acute issues overnight. Eating and drinking. Denies visual or motor deficit. Updated by me in the findings of MR yesterday.     -Data reviewed today: I reviewed all new labs and imaging results over the last 24 hours. I personally reviewed the EKG tracing showing Sinus at 60's in monitor .    Physical Exam   Temp: 97.8  F (36.6  C) Temp src: Temporal BP: 121/71 Pulse: 67   Resp: 16 SpO2: 99 % O2 Device: None (Room air)    Vitals:    01/22/21 1442   Weight: 140.3 kg (309 lb 3.2 oz)     Vital Signs with Ranges  Temp:  [96.8  F (36  C)-97.8  F (36.6  C)] 97.8  F (36.6  C)  Pulse:  [55-69] 67  Resp:  [16-18] 16  BP: ()/(26-84) 121/71  SpO2:  [91 %-100 %] 99 %  I/O last 3 completed shifts:  In: 719 [I.V.:719]  Out: 525 [Urine:525]    GEN:  Alert, oriented x 3, appears comfortable, NAD.  HEENT:  Normocephalic/atraumatic, no scleral icterus, no nasal discharge, mouth moist.  CV:  Regular stacey rhythm. Audible valvular click. No murmur heard or JVD.    LUNGS:  Clear to auscultation bilaterally without rales/rhonchi/wheezing/retractions.  Symmetric chest rise on inhalation noted.  ABD:  Active bowel sounds, soft, non-tender/non-distended.  No rebound/guarding/rigidity.  EXT:  No edema or cyanosis.   No joint synovitis noted.  SKIN:  Dry to touch, see picture below LLE, visualized areas.           Medications     - MEDICATION INSTRUCTIONS -       sodium chloride 100 mL/hr at 01/23/21 0736     Warfarin Therapy Reminder         acetaminophen  1,000 mg Oral TID     donepezil  20 mg Oral At Bedtime     folic acid  5 mg Oral Daily     guanFACINE  1 mg Oral At Bedtime     levothyroxine  150 mcg Oral Daily     liothyronine  25 mcg Oral Daily     memantine  10 mg Oral BID     pregabalin  100 mg Oral TID     propafenone  150 mg Oral Q8H     simvastatin  40 mg Oral At Bedtime     sodium chloride (PF)  3 mL Intracatheter Q8H       Data   Recent Labs   Lab 01/22/21  1105   WBC 6.5   HGB 12.8*         INR 3.65*      POTASSIUM 4.3   CHLORIDE 105   CO2 31   BUN 18   CR 1.58*   ANIONGAP 1*   CATERINA 9.1   *   ALBUMIN 3.2*   PROTTOTAL 7.2   BILITOTAL 0.9   ALKPHOS 106   ALT 24   AST 25   TROPI <0.015     INR   Date Value Ref Range Status   01/23/2021 6.90 (HH) 0.86 - 1.14 Final     Comment:     Critical Value called to and read back by  Elizabeth Carter (ORTHO) on 01.23.21 at 0815 by MA          Recent Results (from the past 24 hour(s))   XR Tibia & Fibula Port Left 2 Views    Narrative    XR TIBIA & FIBULA PORT LT 2 VW 1/22/2021 12:00 PM     HISTORY: WOUND      Impression    IMPRESSION: No evidence of tibial or fibular osseous destruction or  fracture. Extensive atherosclerotic calcification is noted throughout  the leg.    MAITE BERNABE MD   XR Chest Port 1 View    Narrative    CHEST PORTABLE ONE VIEW   1/22/2021 12:01 PM     HISTORY: Weakness.    COMPARISON: Chest x-ray 3/6/2017.      Impression    IMPRESSION: Subtle new patchy opacities at the left lung base compared  to the prior exam. Cannot exclude developing subtle airspace disease  including potentially pneumonia. Right lung appears clear. Stable  elevated right hemidiaphragm. Stable cardiac silhouette and  sternotomy.    RAYO CHIN MD   Head CT  w/o contrast    Narrative    CT SCAN OF THE HEAD WITHOUT CONTRAST   1/22/2021 1:06 PM     HISTORY: Head trauma, minor, normal mental status (Age [age]y).    TECHNIQUE:  Axial images of the head and coronal reformations without  IV contrast material. Radiation dose for this scan was reduced using  automated exposure control, adjustment of the mA and/or kV according  to patient size, or iterative reconstruction technique.    COMPARISON: Head CT 5/1/2013    FINDINGS:  New wedge-shaped area of hypoattenuation is present  involving the right lateral occipital lobe with loss of gray-white  differentiation.    Old bilateral cerebellar infarcts. Old infarct at the junction of the  left parietal and occipital lobe. Mild volume loss is present. White  matter hypoattenuation likely represents mild chronic small vessel  ischemic change. No evidence of hemorrhage or significant mass effect.    The visualized calvarium, tympanic cavities, and mastoid cavities are  unremarkable.      Impression    IMPRESSION:   1. New area of hypoattenuation involving the right occipital lobe,  suspicious for infarct, potentially subacute.  2. No evidence of hemorrhage.  3. Multiple old infarcts.    BRADLEY ROMO MD   MRA Neck (Carotids) wo & w Contrast    Narrative    EXAM: MRA NECK (CAROTIDS) WO AND W CONTRAST, MRA BRAIN ([place]) WO CONTRAST  LOCATION: [facility]  DATE/TIME: 1/22/2021 7:38 PM    INDICATION: Stroke, follow up.  COMPARISON: Brain MR from today. Head CT from today. Head CT 7/18/2018.  CONTRAST: 10 mL Gadavist.  TECHNIQUE:   1) 3D time-of-flight head MRA without intravenous contrast.  2) Neck MRA without and with IV contrast.    FINDINGS:  HEAD MRA:   ANTERIOR CIRCULATION: No stenosis/occlusion, aneurysm, or high flow vascular malformation. Standard [place] anatomy.    POSTERIOR CIRCULATION: There is tapering of the distal aspect of the left posterior cerebral artery in the P2-P3 junction region  which may relate to diffuse stenosis. Note made that there was no acute infarct on comparison brain MR in this vascular   territory. Balanced vertebral arteries supply a normal basilar artery.     NECK MRA:   RIGHT CAROTID: No measurable stenosis or dissection.    LEFT CAROTID: No measurable stenosis or dissection.    VERTEBRAL ARTERIES: No focal stenosis or dissection. The left vertebral artery is slightly dominant. There is mild diffuse stenosis in the distal most aspects of the right vertebral artery.    AORTIC ARCH: Common origin of the brachiocephalic and left common carotid artery, normal variant. No great vessel origin stenosis.      Impression    IMPRESSION:    HEAD MRA:   1.  Tapering of the distal aspect of the left posterior cerebral artery may relate to diffuse stenosis. This is unlikely to reflect acute vessel occlusion in this vessel as there was no associated acute infarct in the left PCA vascular territory.    NECK MRA:  1.  Mild diffuse stenosis distal most aspects right vertebral artery. No hemodynamically significant extracranial stenosis.   MR Brain w/o & w Contrast    Narrative    EXAM: MR BRAIN W/O and W CONTRAST  LOCATION: [facility]  DATE/TIME: 1/22/2021 7:38 PM    INDICATION: Stroke, follow up.  COMPARISON: Brain MR and MRA [place] from today. Head CT from today. Head CT 7/18/2018.  CONTRAST: 10 mL Gadavist  TECHNIQUE: Routine multiplanar multisequence head MRI without and with intravenous contrast.    FINDINGS:  INTRACRANIAL CONTENTS: There is a 3 mm rounded focus of restricted diffusion at the right frontal vertex, involving the region of the right middle frontal gyrus, see image 48 of series 2.2 with associated abnormal FLAIR signal consistent with a tiny   punctate infarct in this location. No other definite evidence of other acute infarct. Chronic appearing right occipital infarct with associated T1 hyperintense signal consistent with some laminar necrosis. There  is a focal region of encephalomalacia   involving the left parieto-occipital junction consistent with old infarct in this location. Bilateral cerebellar hemispheric infarcts also noted. No mass, acute hemorrhage, or extra-axial fluid collection. There are a couple punctate foci of   susceptibility, one within the white matter of the right frontal lobe on image 21 of series 4 and another within the left posterior temporal lobe on image 15 of series 4 which may relate to old foci of hemosiderin deposition. Patchy nonspecific T2/FLAIR   hyperintensities within the cerebral white matter most consistent with mild to moderate chronic microvascular ischemic change. Mild generalized cerebral atrophy. No hydrocephalus. Normal position of the cerebellar tonsils. Left cerebellar hemispheric   DVA. No other pathologic contrast enhancement.    SELLA: No abnormality accounting for technique.    OSSEOUS STRUCTURES/SOFT TISSUES: Normal marrow signal. The major intracranial vascular flow voids are maintained.     ORBITS: No acute orbital abnormality.     SINUSES/MASTOIDS: No paranasal sinus mucosal disease. No middle ear or mastoid effusion.       Impression    IMPRESSION:  1.  3 mm focus of restricted diffusion at the right frontal vertex, involving the right middle frontal gyrus region without associated mass effect or hemorrhage consistent with acute/subacute infarct. No mass effect.    2.  Numerous other chronic appearing infarcts, with dominant infarct present in the right occipital lobe associated with laminar necrosis. Additional bilateral cerebellar hemispheric infarct also present.       MRA Brain ([place]) wo Contrast    Narrative    EXAM: MRA NECK (CAROTIDS) WO AND W CONTRAST, MRA BRAIN ([place]) WO CONTRAST  LOCATION: [facility]  DATE/TIME: 1/22/2021 7:38 PM    INDICATION: Stroke, follow up.  COMPARISON: Brain MR from today. Head CT from today. Head CT 7/18/2018.  CONTRAST: 10 mL  Gadavist.  TECHNIQUE:   1) 3D time-of-flight head MRA without intravenous contrast.  2) Neck MRA without and with IV contrast.    FINDINGS:  HEAD MRA:   ANTERIOR CIRCULATION: No stenosis/occlusion, aneurysm, or high flow vascular malformation. Standard [place] anatomy.    POSTERIOR CIRCULATION: There is tapering of the distal aspect of the left posterior cerebral artery in the P2-P3 junction region which may relate to diffuse stenosis. Note made that there was no acute infarct on comparison brain MR in this vascular   territory. Balanced vertebral arteries supply a normal basilar artery.     NECK MRA:   RIGHT CAROTID: No measurable stenosis or dissection.    LEFT CAROTID: No measurable stenosis or dissection.    VERTEBRAL ARTERIES: No focal stenosis or dissection. The left vertebral artery is slightly dominant. There is mild diffuse stenosis in the distal most aspects of the right vertebral artery.    AORTIC ARCH: Common origin of the brachiocephalic and left common carotid artery, normal variant. No great vessel origin stenosis.      Impression    IMPRESSION:    HEAD MRA:   1.  Tapering of the distal aspect of the left posterior cerebral artery may relate to diffuse stenosis. This is unlikely to reflect acute vessel occlusion in this vessel as there was no associated acute infarct in the left PCA vascular territory.    NECK MRA:  1.  Mild diffuse stenosis distal most aspects right vertebral artery. No hemodynamically significant extracranial stenosis.       Disclaimer: This note consists of symbols derived from keyboarding, dictation and/or voice recognition software. As a result, there may be errors in the script that have gone undetected. Please consider this when interpreting information found in this chart.

## 2021-01-23 NOTE — H&P
"HOSPITAL NEUROLOGY      History of the Present Illness:  65 year old male presents to ED yesterday with generalized weakness.      Nicko has only vague memories of the events that brought him to the ER yesterday (1/22).  He states he was told by his wife that he came into her room (they sleep in separate rooms) asking for his pills.  He does not actually remember doing this, \"I don't know why I would, I already took my pills?\"  He remembers slouching onto the couch and sliding down.  He felt diffusely weak and very drowsy, \"all I wanted to do was sleep.\"  He called for his son-in-law and was taken to the hospital.     I also called Lissy (his wife) to provide collateral information.  She relates that 12:30AM 1/22 he was very confused, asking her where his pills had gone.  At 9AM, the family could hear 'babbling' and found him off the bed, between the wall and the bed.  His right arm was shaking and he was unresponsive.  She has two children with epilepsy and so is quite knowledgeable about the condition.  The exact duration is unclear other than being brief but then he was incoherent and disoriented.  This is when an ambulance was called.  Of note, he wears Depends at night having incontinence at baseline.        Previous Records Reviewed    [facility]   11/27/2020 - Behavioral health  - recent suicide attempt (overdose), dx severe major depressive disorder, general anxiety disorder    Review of systems:  Constitutional: As mentioned above.   Eyes:+\"I don't have my glasses\" - no other new visual symptoms  ENT: +baseline hearing loss  Cardiovascular: No chest pain or palpitation  Respiratory: No shortness of breath  Gastrointestinal: No abdominal pain, diarrhea or constipation  Genitourinary: No burning on micturition   Musculoskeletal: +significant pain in the left shoulder and left lower leg from recent fall/injury      Past Medical History:   Diagnosis Date     Alcohol dependence (H)      Allergy, " unspecified not elsewhere classified     seasonal     Antiplatelet or antithrombotic long-term use     coumadin/lovenox     Aortic valve prosthesis present      Atrial fibrillation (H)      Blood transfusion     after heart surg 1992     BPH (benign prostatic hypertrophy)      Chronic infection     low back wound incision not healing      Chronic pain     lower back and right leg and left leg     Coagulation disorder (H)     on blood thinners     Dissection of aorta, thoracic (H) 1992    St Jose F aotic valve + arch graft 1992     Headache(784.0)      Heart murmur     aortic valve replaced and arch     History of spinal cord injury      Low back pain      Major depression      Mixed hyperlipidemia      Numbness and tingling     right leg post surg rightt hip/ also left leg since surg     OA (osteoarthritis)     hips     Obesity, unspecified      Prostate infection      Sciatica 2002    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 Jose F's valve     C TOTAL HIP ARTHROPLASTY  2010    Left AMANDA     C TOTAL HIP ARTHROPLASTY  2002    R hip replacement comp's by nerve injury with pain down into R leg, nadya below knee     CATARACT IOL, RT/LT      rt eye only     CHOLECYSTECTOMY, LAPOROSCOPIC  8/10     COLONOSCOPY N/A 1/15/2015    Procedure: COLONOSCOPY;  Surgeon: Johnson Kothari MD;  Location:  GI     DECOMPRESSION LUMBAR ONE LEVEL  4/3/2013    Procedure: DECOMPRESSION LUMBAR ONE LEVEL;  Open Decompression L3-4 bilateral;  Surgeon: Travis Coffey MD;  Location:  OR     DECOMPRESSION, FUSION CERVICAL ANTERIOR ONE LEVEL, COMBINED  3/23/2012    Procedure:COMBINED DECOMPRESSION, FUSION CERVICAL ANTERIOR ONE LEVEL; Anterior Cervical Decompression and Fusion C4-6; Surgeon:TRAVIS COFFEY; Location: OR     EXPLORE SPINE, REMOVE HARDWARE, COMBINED  5/23/2013    Procedure: COMBINED  EXPLORE SPINE, REMOVE HARDWARE;  Exploration Lumbar Wound for fluid collection;  Surgeon: Khalif Coffey MD;  Location: [facility] OR     FUSION CERVICAL ANTERIOR TWO LEVELS  3/26/2012    Procedure:FUSION CERVICAL ANTERIOR TWO LEVELS; Anterior Cervical Fusion C4-6, Anterior Cervical  Hematoma Evacuation; Surgeon:KHALIF COFFEY; Location:[facility] OR     IRRIGATION AND DEBRIDEMENT LOWER EXTREMITY, COMBINED Left 1/10/2021    Procedure: 1.  Irrigation and excisional debridement to muscle left distal medial calf chronic wounds total area measuring 9 cm x 4 cm 2.  Irrigation and excisional debridement to fascia left medial calf chronic hematoma measuring 29 x 6.7 x 4.2 cm 3.  Primary complex wound closure left medial distal calf 9 cm in length;  Surgeon: Rod Kemp MD;  Location: [facility] OR     IRRIGATION AND DEBRIDEMENT SPINE, CLOSE WOUND, COMBINED  3/26/2012    Procedure:COMBINED IRRIGATION AND DEBRIDEMENT SPINE, CLOSE WOUND; Surgeon:KHALIF COFFEY; Location:[facility] OR     removal of cyst of back   2.5week     TONSILLECTOMY       wisdom teeth[       [facility] NONSPECIFIC PROCEDURE  1992    repair of TAA with graft        Social History     Tobacco Use     Smoking status: Current Every Day Smoker     Packs/day: 0.00     Years: 3.00     Pack years: 0.00     Types: Clove cigarettes or kreteks, Pipe, Cigars     Smokeless tobacco: Never Used   Substance Use Topics     Alcohol use: No     Comment: Stopped drinking alcohol ~     Drug use: No        Family History   Problem Relation Age of Onset     Cerebrovascular Disease Father          age 86, had M.I. ,diabetic also     Prostate Cancer Father      Cancer Mother          age 83 of dementia, also h/o lymphoma     Hypertension Brother         2 brothers with hypertension & sleep apnea     Hypertension Sister         Born ~1936     Sleep Apnea Brother          age 78, Alzheimers     No Known Problems Son      No Known Problems Daughter      No Known Problems Son       Family History Negative Brother         Had 5 brothers, three still alive as of 2019     Colon Cancer No family hx of           Current Facility-Administered Medications:      acetaminophen (TYLENOL) tablet 1,000 mg, 1,000 mg, Oral, TID, Mathew Mcfadden MD, 1,000 mg at 01/23/21 1429     azelastine (ASTELIN) nasal spray 2 spray, 2 spray, Both Nostrils, BID PRN, Mathew Mcfadden MD     cetirizine (zyrTEC) tablet 10 mg, 10 mg, Oral, Daily PRN, Mathew Mcfadden MD     donepezil (ARICEPT) tablet 20 mg, 20 mg, Oral, At Bedtime, Mathew Mcfadden MD, 20 mg at 01/22/21 2154     folic acid (FOLVITE) tablet 5 mg, 5 mg, Oral, Daily, Mathew Mcfadden MD, 5 mg at 01/23/21 0839     guanFACINE (TENEX) tablet 1 mg, 1 mg, Oral, At Bedtime, Mathew Mcfadden MD, 1 mg at 01/22/21 2155     levothyroxine (SYNTHROID/LEVOTHROID) tablet 150 mcg, 150 mcg, Oral, Daily, Mathew Mcfadden MD, 150 mcg at 01/23/21 0639     lidocaine (LMX4) cream, , Topical, Q1H PRN, Mathew Mcfadden MD     lidocaine 1 % 0.1-1 mL, 0.1-1 mL, Other, Q1H PRN, Mathew Mcfadden MD     liothyronine (CYTOMEL) tablet 25 mcg, 25 mcg, Oral, Daily, Mathew Mcfadden MD, 25 mcg at 01/23/21 0639     melatonin tablet 1 mg, 1 mg, Oral, At Bedtime PRN, Mathew Mcfadden MD     memantine (NAMENDA) tablet 10 mg, 10 mg, Oral, BID, Mathew Mcfadden MD, 10 mg at 01/23/21 0839     Patient is already receiving anticoagulation with heparin, enoxaparin (LOVENOX), warfarin (COUMADIN)  or other anticoagulant medication, , Does not apply, Continuous PRN, Mathew Mcfadden MD     [COMPLETED] perflutren diluted 1mL to 2mL with saline (OPTISON) diluted injection 3 mL, 3 mL, Intravenous, Once, Mathew Mcfadden MD, 3 mL at 01/23/21 1500     pregabalin (LYRICA) capsule 100 mg, 100 mg, Oral, TID, Mathew Mcfadden MD, 100 mg at 01/23/21 1430     propafenone (RYTHMOL) tablet 150 mg, 150 mg, Oral, Q8H, Mathew Mcfadden MD, 150 mg at 01/23/21 1429     senna-docusate  "(SENOKOT-S/PERICOLACE) 8.6-50 MG per tablet 1 tablet, 1 tablet, Oral, BID PRN, Mathew Mcfadden MD     simvastatin (ZOCOR) tablet 40 mg, 40 mg, Oral, At Bedtime, Mathew Mcfadden MD     sodium chloride (PF) 0.9% PF flush 3 mL, 3 mL, Intracatheter, Q8H, Mathew Mcfadden MD, 3 mL at 01/22/21 2042     sodium chloride (PF) 0.9% PF flush 3 mL, 3 mL, Intracatheter, q1 min prn, Mathew Mcfadden MD     sodium chloride 0.9% infusion, , Intravenous, Continuous, Mathew Mcfadden MD, Last Rate: 100 mL/hr at 01/23/21 1429, New Bag at 01/23/21 1429     Warfarin Therapy Reminder (Check START DATE - warfarin may be starting in the FUTURE), 1 each, Does not apply, Continuous PRN, Mathew Mcfadden MD     warfarin-No DOSE today, 1 each, Does not apply, no dose today (warfarin), Mathew Mcfadden MD    Allergies:  Allergies   Allergen Reactions     Gabapentin      Severe behavioral disturbances       Physical Examination:     /71   Pulse 67   Temp 97.8  F (36.6  C)   Resp 16   Ht 1.854 m (6' 1\")   Wt 140.3 kg (309 lb 3.2 oz)   SpO2 99%   BMI 40.79 kg/m      Body mass index is 40.79 kg/m .    NEUROLOGICAL:   MENTAL STATUS:   Alert and oriented x self, place ([facility] in [place]), time (January 2021 but did not know date or day of the week). Thought process and content unremarkable. Follows commands appropriately. Speech fluent. No anomia.      CN:   II: Visual fields intact. PERRLA.   III, IV, VI: Dysconjugate gaze, cannot abduct right eye, nystagmus with left gaze (this is baseline)  V: Symmetric facial sensation to light touch.   VII: Face symmetric.   VIII: Hearing symmetric (though poor bilaterally)  IX, X: Symmetric palatal rise.   XI: Symmetric shoulder shrug.   XII: Tongue midline with symmetric movements.     MOTOR:   No drift, tone grossly normal  Movements limited by pain in the left shoulder, though strength essentially intact to shoulder abduction, extension and flexion at the elbow, and "     Hip flexion, dorsiflexion, plantarflexion all intact    REFLEXES:   Difficult to elicit      RIGHT:                LEFT:   Biceps        1/4                    1-2/4   Brachioradialis      1/4                     1-2/4   Triceps                1-2/4                    1-2/4   Patellar               trace                     1+  Achilles                 1/4                      2/4    Plantar response flexor b/l. No clonus.     SENSATION:   States reduced sensation in the feet bilaterally    CEREBELLAR:   Normal F-N-F. Normal H-S (this was limited by pain however).    GAIT:   Deferred       Review of Diagnostics:  I personally reviewed and interpreted the following:    Results for ASCHOFF, TODD S (MRN [id number]) as of 1/23/2021 15:18   1/23/2021 07:48   Sodium 139   Potassium 3.9   Chloride 108   Carbon Dioxide 29   Urea Nitrogen 21   Creatinine 1.62 (H)   GFR Estimate 44 (L)   GFR Estimate If Black 51 (L)   Calcium 8.4 (L)       Results for ASCHOFF, TODD S (MRN [id number]) as of 1/23/2021 15:18   1/22/2021 11:05   Protein Total 7.2   Bilirubin Total 0.9   Alkaline Phosphatase 106   ALT 24   AST 25       Complete Blood Count:    Recent Labs   Lab Test 01/22/21  1105 01/13/21  0709 01/12/21  0710 01/10/21  0633 01/10/21  0633 01/09/21  1246 12/08/20  1328   WBC 6.5 6.8  --   --  6.0 6.0 6.0   RBC 4.03* 3.87*  --   --  4.61 4.83 3.38*   HGB 12.8* 12.3* 13.8   < > 14.7 15.6 10.9*   HCT 40.2 39.4*  --   --  45.7 47.3 32.7*    190  --   --  214 246 196   LYMPH 14.2  --   --   --   --  23.3 18.8    < > = values in this interval not displayed.        HgA1c:   Lab Results   Component Value Date    A1C 4.9 01/23/2021        Thyroid Stimulating Hormone:   Lab Results   Component Value Date    TSH 2.17 11/30/2020        MRI Brain without and With Contrast (1/22/2021)  - reviewed images personally with very small area of restricted diffusion in right frontal region, scattered chronic infarcts  IMPRESSION:  1.  3  mm focus of restricted diffusion at the right frontal vertex, involving the right middle frontal gyrus region without associated mass effect or hemorrhage consistent with acute/subacute infarct. No mass effect.     2.  Numerous other chronic appearing infarcts, with dominant infarct present in the right occipital lobe associated with laminar necrosis. Additional bilateral cerebellar hemispheric infarct also present.    MRA Head and Neck  IMPRESSION:     HEAD MRA:   1.  Tapering of the distal aspect of the left posterior cerebral artery may relate to diffuse stenosis. This is unlikely to reflect acute vessel occlusion in this vessel as there was no associated acute infarct in the left PCA vascular territory.     NECK MRA:  1.  Mild diffuse stenosis distal most aspects right vertebral artery. No hemodynamically significant extracranial stenosis.      Impression:  Mr. Aschoff is a 65 year old male admitted 1/23/2021 with generalized weakness.  He has a number of other medical problems, also on medication for chronic pain which can be problematic for patients with a diagnosis of dementia (though untreated pain is problematic too).  In the setting of new renal insufficiency, it has been suggested that a drug effect could be at play.  He also was found to have a recent small stroke and the vascular neurology service is seeing him as well.    His exam is not terribly remarkable.  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  He also notes that he has felt some numbness in his feet for quite some time, this is not new.      I have been contacted because apparently his wife described seizure like activity prior to the symptoms that led him to the hospital.  His description of having no memory of certain behaviors, followed by generalized weakness and severe lethargy is highly characteristic of a seizure.  Lissy describes right arm shaking and unresponsiveness, this  essentially confirms that this was a (focal onset) seizure in my view.      Because of his abnormal MRI with areas of injury that could serve as a seizure focus, I told him and his wife that the chance for seizure recurrence would be relatively high.  I would recommend starting an anti-seizure medication.  Though neither of us are pleased to add yet another medication to his list.  But I feel the alternative would be much more dangerous.      Reviewing his medical records, I see there is a significant psychiatric history.  I will avoid the use of levetiracetam given this fact.  Lamotrigine will be a better option. The downside is that it will take quite some time to titrate up to a therapeutic level to prevent Roldan-Kamran Syndrome.       Recommendations:  - He almost certainly suffered a seizure.   Given the abnormal MRI brain, I am recommending he start an anti-seizure medication    - If ok from internal medicine standpoint, I recommend starting lamotrigine 25mg once daily.  I explained to his wife that this is a very low dose and will not protect him from seizures until it is increased.  But it can only be increased only very slowly to prevent a small risk of a dangerous rash.  But this medication should pose among the lowest risk of issues for worsening other psychiatric conditions (relative to seizure medications in general).      - He will need follow up with general neurology for med titration (among other issues) - after 2 weeks he will need to increase lamotrigine to 50mg qday     - EEG can be done as an outpatient, it will not  (even if it is completely normal, I told him my recommendations would remain the same)    No driving, climbing, tub baths, use of power tools or any potentially dangerous activity should a seizure recur.  This is for the next 3 months.  I discussed this with his wife.  Apparently with some cognitive decline, there has already been some concern with him driving.   As they have family members with epilepsy, they are very aware of the laws regarding driving and seizures.        Continue to provide reorientation, reassurance, and stimulation during the day with lights on, blinds open, and the television on alternating with dark, quiet environment at night.  Interruptions during the night should be limited as much as possible.      oJrdan Torrez MD (general neurology)  pgr [phone]          [unfilled]    1. Generalized muscle weakness    2. Hypotension, unspecified hypotension type    3. Polypharmacy    4. Anemia, unspecified type    5. Renal insufficiency

## 2021-01-23 NOTE — CONSULTS
Care Management Initial Consult    General Information  Assessment completed with: Patient, Family, (Nicko and wife Paul)  Type of CM/SW Visit: Initial Assessment    Primary Care Provider verified and updated as needed: Yes   Readmission within the last 30 days: previous discharge plan unsuccessful   Return Category: Progression of disease     Advance Care Planning:            Communication Assessment  Patient's communication style: spoken language (English or Bilingual)    Hearing Difficulty or Deaf: yes   Wear Glasses or Blind: yes    Cognitive  Cognitive/Neuro/Behavioral: WDL  Level of Consciousness: lethargic        Mood/Behavior: calm, cooperative          Living Environment:   People in home: child(morgan), adult, grandchild(morgan), spouse     Current living Arrangements: house      Able to return to prior arrangements:         Family/Social Support:  Care provided by: spouse/significant other, child(morgan)  Provides care for: no one, unable/limited ability to care for self  Marital Status:   Wife, Children          Description of Support System: Supportive, Involved    Support Assessment: Adequate family and caregiver support    Current Resources:   Skilled Home Care Services:    Community Resources: None  Equipment currently used at home: cane, straight, walker, standard  Supplies currently used at home:      Employment/Financial:  Employment Status: disabled        Financial Concerns: No concerns identified           Lifestyle & Psychosocial Needs:  Lifestyle     Physical activity     Days per week: Not on file     Minutes per session: Not on file     Stress: Only a little     Social Needs     Financial resource strain: Not hard at all     Food insecurity     Worry: Never true     Inability: Never true     Transportation needs     Medical: No     Non-medical: No     Socioeconomic History     Marital status:      Spouse name: Not on file     Number of children: 3     Years of education: Not on file      Highest education level: High school graduate   Occupational History     Employer: DISABLED     Comment: Previous , disabled since ~2013   Relationships     Social connections     Talks on phone: Once a week     Gets together: Once a week     Attends Synagogue service: More than 4 times per year     Active member of club or organization: No     Attends meetings of clubs or organizations: Never     Relationship status:      Intimate partner violence     Fear of current or ex partner: No     Emotionally abused: No     Physically abused: No     Forced sexual activity: No     Tobacco Use     Smoking status: Current Every Day Smoker     Packs/day: 0.00     Years: 3.00     Pack years: 0.00     Types: Clove cigarettes or kreteks, Pipe, Cigars     Smokeless tobacco: Never Used   Substance and Sexual Activity     Alcohol use: No     Comment: Stopped drinking alcohol ~2009     Drug use: No     Sexual activity: Not Currently       Functional Status:  Prior to admission patient needed assistance:   Dependent ADLs:: Ambulation-cane, Bathing, Dressing, Grooming, Toileting  Dependent IADLs:: Cleaning, Cooking, Laundry, Shopping, Meal Preparation, Transportation       Mental Health Status:      Pt has history of depression.  He said he was IP psych recently due to attempted suicide.  He said he is not coping well.  He does have a psychiatrist and is on medications.    Chemical Dependency Status:    NA            Values/Beliefs:  Spiritual, Cultural Beliefs, Buddhism Practices, Values that affect care: no               Additional Information:  Pt presented to hospital acute mental status change, weakness and hypotension. He was recently discharged (hospitalized 1/9-1/13) after incision and drainage of infected hematoma in the left lower extremity, patient has been recovering at home for that. CTS spoke with patient and wife. Pt said that his wife doesn't want him home because he is too much work.  His wife  explained that she recently broke her ankle and is non weight bearing herself so she cannot help much. Dtg and KATHIA helping with all cares.  Pt has not been able to bath since discharge. They live in [place] home with dtg, KATHIA and 4 grandchildren.  Pt is needing assistance with ambulation and balance.  He has fallen twice at home in the past week.  He has a wound on his leg and was discharged with a boot that they both described as too big and worthless. Wife is concerned for pt safety at home. He currently does not have HC and she said getting his INR drawn at the clinic is difficult due to stairs in the home. Wife would like to see pt go to a TCU to regain strength. She would like [facility] [address].  Pt might be a good candidate for ARU.  CTS paged MF requesting PT/OT consults to help determine needs. No referral sent at this time.    Anastasiia Thorne RN, BSN, PHN, CTS  Care Coordinator  [facility]  [phone]

## 2021-01-23 NOTE — PLAN OF CARE
A&Ox4 but lethargic, Ax2 W/GB. LS clear, BS active, tolerating reg diet, voiding well. L shin wounds from past I&D scabbed with serosanguinous drainage, dressing CDI and leg elevated. Tele SR - HR 60's. Soft BP 95/57. Will continue to monitor.

## 2021-01-24 ENCOUNTER — ANESTHESIA EVENT (OUTPATIENT)
Dept: SURGERY | Facility: CLINIC | Age: 66
End: 2021-01-24
Payer: COMMERCIAL

## 2021-01-24 ENCOUNTER — APPOINTMENT (OUTPATIENT)
Dept: PHYSICAL THERAPY | Facility: CLINIC | Age: 66
End: 2021-01-24
Attending: HOSPITALIST
Payer: COMMERCIAL

## 2021-01-24 LAB
ANION GAP SERPL CALCULATED.3IONS-SCNC: 1 MMOL/L (ref 3–14)
ANION GAP SERPL CALCULATED.3IONS-SCNC: <1 MMOL/L (ref 3–14)
BASOPHILS # BLD AUTO: 0.1 10E9/L (ref 0–0.2)
BASOPHILS NFR BLD AUTO: 1 %
BUN SERPL-MCNC: 20 MG/DL (ref 7–30)
BUN SERPL-MCNC: 21 MG/DL (ref 7–30)
CALCIUM SERPL-MCNC: 8.1 MG/DL (ref 8.5–10.1)
CALCIUM SERPL-MCNC: 8.6 MG/DL (ref 8.5–10.1)
CHLORIDE SERPL-SCNC: 112 MMOL/L (ref 94–109)
CHLORIDE SERPL-SCNC: 112 MMOL/L (ref 94–109)
CO2 SERPL-SCNC: 25 MMOL/L (ref 20–32)
CO2 SERPL-SCNC: 30 MMOL/L (ref 20–32)
CREAT SERPL-MCNC: 1.39 MG/DL (ref 0.66–1.25)
CREAT SERPL-MCNC: 1.66 MG/DL (ref 0.66–1.25)
DIFFERENTIAL METHOD BLD: ABNORMAL
EOSINOPHIL # BLD AUTO: 0.3 10E9/L (ref 0–0.7)
EOSINOPHIL NFR BLD AUTO: 5.1 %
ERYTHROCYTE [DISTWIDTH] IN BLOOD BY AUTOMATED COUNT: 14.3 % (ref 10–15)
GFR SERPL CREATININE-BSD FRML MDRD: 43 ML/MIN/{1.73_M2}
GFR SERPL CREATININE-BSD FRML MDRD: 53 ML/MIN/{1.73_M2}
GLUCOSE SERPL-MCNC: 102 MG/DL (ref 70–99)
GLUCOSE SERPL-MCNC: 89 MG/DL (ref 70–99)
HCT VFR BLD AUTO: 35.2 % (ref 40–53)
HGB BLD-MCNC: 11.2 G/DL (ref 13.3–17.7)
IMM GRANULOCYTES # BLD: 0 10E9/L (ref 0–0.4)
IMM GRANULOCYTES NFR BLD: 0.3 %
INR PPP: 6.61 (ref 0.86–1.14)
LYMPHOCYTES # BLD AUTO: 2.1 10E9/L (ref 0.8–5.3)
LYMPHOCYTES NFR BLD AUTO: 34.5 %
MCH RBC QN AUTO: 31.7 PG (ref 26.5–33)
MCHC RBC AUTO-ENTMCNC: 31.8 G/DL (ref 31.5–36.5)
MCV RBC AUTO: 100 FL (ref 78–100)
MONOCYTES # BLD AUTO: 0.7 10E9/L (ref 0–1.3)
MONOCYTES NFR BLD AUTO: 11.9 %
NEUTROPHILS # BLD AUTO: 2.9 10E9/L (ref 1.6–8.3)
NEUTROPHILS NFR BLD AUTO: 47.2 %
NRBC # BLD AUTO: 0 10*3/UL
NRBC BLD AUTO-RTO: 0 /100
PLATELET # BLD AUTO: 224 10E9/L (ref 150–450)
POTASSIUM SERPL-SCNC: 4.2 MMOL/L (ref 3.4–5.3)
POTASSIUM SERPL-SCNC: 4.4 MMOL/L (ref 3.4–5.3)
RBC # BLD AUTO: 3.53 10E12/L (ref 4.4–5.9)
SODIUM SERPL-SCNC: 138 MMOL/L (ref 133–144)
SODIUM SERPL-SCNC: 142 MMOL/L (ref 133–144)
WBC # BLD AUTO: 6.1 10E9/L (ref 4–11)

## 2021-01-24 PROCEDURE — 36569 INSJ PICC 5 YR+ W/O IMAGING: CPT

## 2021-01-24 PROCEDURE — 97530 THERAPEUTIC ACTIVITIES: CPT | Mod: GP | Performed by: PHYSICAL THERAPIST

## 2021-01-24 PROCEDURE — 120N000001 HC R&B MED SURG/OB

## 2021-01-24 PROCEDURE — 250N000013 HC RX MED GY IP 250 OP 250 PS 637: Performed by: INTERNAL MEDICINE

## 2021-01-24 PROCEDURE — 250N000009 HC RX 250: Performed by: INTERNAL MEDICINE

## 2021-01-24 PROCEDURE — 97162 PT EVAL MOD COMPLEX 30 MIN: CPT | Mod: GP | Performed by: PHYSICAL THERAPIST

## 2021-01-24 PROCEDURE — 99233 SBSQ HOSP IP/OBS HIGH 50: CPT | Performed by: INTERNAL MEDICINE

## 2021-01-24 PROCEDURE — 36415 COLL VENOUS BLD VENIPUNCTURE: CPT | Performed by: HOSPITALIST

## 2021-01-24 PROCEDURE — 36415 COLL VENOUS BLD VENIPUNCTURE: CPT | Performed by: INTERNAL MEDICINE

## 2021-01-24 PROCEDURE — 258N000002 HC RX IP 258 OP 250: Performed by: INTERNAL MEDICINE

## 2021-01-24 PROCEDURE — 80048 BASIC METABOLIC PNL TOTAL CA: CPT | Performed by: HOSPITALIST

## 2021-01-24 PROCEDURE — 85025 COMPLETE CBC W/AUTO DIFF WBC: CPT | Performed by: HOSPITALIST

## 2021-01-24 PROCEDURE — 250N000013 HC RX MED GY IP 250 OP 250 PS 637: Performed by: HOSPITALIST

## 2021-01-24 PROCEDURE — 80048 BASIC METABOLIC PNL TOTAL CA: CPT | Performed by: INTERNAL MEDICINE

## 2021-01-24 PROCEDURE — 272N000433 HC KIT CATH IV 18 OR 20G CM, POWERGLIDE W MAX BARRIER

## 2021-01-24 PROCEDURE — 85610 PROTHROMBIN TIME: CPT | Performed by: HOSPITALIST

## 2021-01-24 PROCEDURE — 250N000011 HC RX IP 250 OP 636: Performed by: INTERNAL MEDICINE

## 2021-01-24 RX ORDER — SILVER SULFADIAZINE 10 MG/G
CREAM TOPICAL 2 TIMES DAILY
Status: DISCONTINUED | OUTPATIENT
Start: 2021-01-24 | End: 2021-02-08 | Stop reason: HOSPADM

## 2021-01-24 RX ORDER — NALOXONE HYDROCHLORIDE 0.4 MG/ML
0.4 INJECTION, SOLUTION INTRAMUSCULAR; INTRAVENOUS; SUBCUTANEOUS
Status: DISCONTINUED | OUTPATIENT
Start: 2021-01-24 | End: 2021-02-08 | Stop reason: HOSPADM

## 2021-01-24 RX ORDER — MULTIPLE VITAMINS W/ MINERALS TAB 9MG-400MCG
1 TAB ORAL DAILY
Status: DISCONTINUED | OUTPATIENT
Start: 2021-01-24 | End: 2021-02-08 | Stop reason: HOSPADM

## 2021-01-24 RX ORDER — ZINC SULFATE 50(220)MG
220 CAPSULE ORAL DAILY
Status: DISPENSED | OUTPATIENT
Start: 2021-01-25 | End: 2021-02-04

## 2021-01-24 RX ORDER — LAMOTRIGINE 25 MG/1
25 TABLET ORAL DAILY
Status: DISCONTINUED | OUTPATIENT
Start: 2021-01-24 | End: 2021-02-08 | Stop reason: HOSPADM

## 2021-01-24 RX ORDER — SODIUM CHLORIDE 450 MG/100ML
INJECTION, SOLUTION INTRAVENOUS CONTINUOUS
Status: DISCONTINUED | OUTPATIENT
Start: 2021-01-24 | End: 2021-01-27

## 2021-01-24 RX ORDER — NALOXONE HYDROCHLORIDE 0.4 MG/ML
0.2 INJECTION, SOLUTION INTRAMUSCULAR; INTRAVENOUS; SUBCUTANEOUS
Status: DISCONTINUED | OUTPATIENT
Start: 2021-01-24 | End: 2021-02-08 | Stop reason: HOSPADM

## 2021-01-24 RX ORDER — VENLAFAXINE HYDROCHLORIDE 150 MG/1
300 CAPSULE, EXTENDED RELEASE ORAL DAILY
Status: DISCONTINUED | OUTPATIENT
Start: 2021-01-24 | End: 2021-02-08 | Stop reason: HOSPADM

## 2021-01-24 RX ORDER — CEFTRIAXONE 1 G/1
1 INJECTION, POWDER, FOR SOLUTION INTRAMUSCULAR; INTRAVENOUS EVERY 24 HOURS
Status: COMPLETED | OUTPATIENT
Start: 2021-01-24 | End: 2021-01-29

## 2021-01-24 RX ORDER — OXYCODONE HYDROCHLORIDE 5 MG/1
5 TABLET ORAL EVERY 4 HOURS PRN
Status: DISCONTINUED | OUTPATIENT
Start: 2021-01-24 | End: 2021-02-08 | Stop reason: HOSPADM

## 2021-01-24 RX ORDER — ASCORBIC ACID 500 MG
500 TABLET ORAL DAILY
Status: DISPENSED | OUTPATIENT
Start: 2021-01-25 | End: 2021-02-04

## 2021-01-24 RX ADMIN — OXYCODONE HYDROCHLORIDE 5 MG: 5 TABLET ORAL at 10:12

## 2021-01-24 RX ADMIN — PROPAFENONE HYDROCHLORIDE 150 MG: 150 TABLET, FILM COATED ORAL at 06:59

## 2021-01-24 RX ADMIN — OXYCODONE HYDROCHLORIDE 5 MG: 5 TABLET ORAL at 21:41

## 2021-01-24 RX ADMIN — PROPAFENONE HYDROCHLORIDE 150 MG: 150 TABLET, FILM COATED ORAL at 21:41

## 2021-01-24 RX ADMIN — OXYCODONE HYDROCHLORIDE 5 MG: 5 TABLET ORAL at 17:37

## 2021-01-24 RX ADMIN — ACETAMINOPHEN 1000 MG: 500 TABLET, FILM COATED ORAL at 07:49

## 2021-01-24 RX ADMIN — GUANFACINE 1 MG: 1 TABLET ORAL at 21:41

## 2021-01-24 RX ADMIN — PREGABALIN 100 MG: 100 CAPSULE ORAL at 19:44

## 2021-01-24 RX ADMIN — LAMOTRIGINE 25 MG: 25 TABLET ORAL at 10:19

## 2021-01-24 RX ADMIN — PREGABALIN 100 MG: 100 CAPSULE ORAL at 07:49

## 2021-01-24 RX ADMIN — ACETAMINOPHEN 1000 MG: 500 TABLET, FILM COATED ORAL at 19:44

## 2021-01-24 RX ADMIN — FOLIC ACID 5 MG: 1 TABLET ORAL at 07:48

## 2021-01-24 RX ADMIN — PREGABALIN 100 MG: 100 CAPSULE ORAL at 13:17

## 2021-01-24 RX ADMIN — CETIRIZINE HYDROCHLORIDE 10 MG: 10 TABLET, FILM COATED ORAL at 19:44

## 2021-01-24 RX ADMIN — SILVER SULFADIAZINE: 10 CREAM TOPICAL at 21:44

## 2021-01-24 RX ADMIN — SILVER SULFADIAZINE: 10 CREAM TOPICAL at 13:17

## 2021-01-24 RX ADMIN — PHYTONADIONE 2.5 MG: 10 INJECTION, EMULSION INTRAMUSCULAR; INTRAVENOUS; SUBCUTANEOUS at 11:23

## 2021-01-24 RX ADMIN — DONEPEZIL HYDROCHLORIDE 20 MG: 10 TABLET ORAL at 21:41

## 2021-01-24 RX ADMIN — VENLAFAXINE HYDROCHLORIDE 300 MG: 150 CAPSULE, EXTENDED RELEASE ORAL at 10:11

## 2021-01-24 RX ADMIN — CEFTRIAXONE 1 G: 1 INJECTION, POWDER, FOR SOLUTION INTRAMUSCULAR; INTRAVENOUS at 14:39

## 2021-01-24 RX ADMIN — LEVOTHYROXINE SODIUM 150 MCG: 150 TABLET ORAL at 06:58

## 2021-01-24 RX ADMIN — SIMVASTATIN 40 MG: 40 TABLET, FILM COATED ORAL at 21:41

## 2021-01-24 RX ADMIN — ACETAMINOPHEN 1000 MG: 500 TABLET, FILM COATED ORAL at 13:17

## 2021-01-24 RX ADMIN — PROPAFENONE HYDROCHLORIDE 150 MG: 150 TABLET, FILM COATED ORAL at 13:17

## 2021-01-24 RX ADMIN — MULTIPLE VITAMINS W/ MINERALS TAB 1 TABLET: TAB at 16:08

## 2021-01-24 RX ADMIN — LIOTHYRONINE SODIUM 25 MCG: 25 TABLET ORAL at 06:58

## 2021-01-24 RX ADMIN — MEMANTINE 10 MG: 5 TABLET ORAL at 07:48

## 2021-01-24 RX ADMIN — SODIUM CHLORIDE: 4.5 INJECTION, SOLUTION INTRAVENOUS at 14:39

## 2021-01-24 RX ADMIN — MEMANTINE 10 MG: 5 TABLET ORAL at 19:44

## 2021-01-24 ASSESSMENT — ACTIVITIES OF DAILY LIVING (ADL)
ADLS_ACUITY_SCORE: 19
ADLS_ACUITY_SCORE: 18
ADLS_ACUITY_SCORE: 19

## 2021-01-24 NOTE — H&P (VIEW-ONLY)
Consults     [facility]   Plastic Surgery Consultation            Assessment and Plan:   Assessment:   Todd S Aschoff is a 65 year old male with a history of atrial fibrillation on Coumadin who was admitted on January 22, 2021.  He is also 2 weeks status post I&D of his left medial lower extremity due to a Coumadin induced hematoma.  Bedside underwent necrosis and dehiscence and plastic surgery was consulted by Dr. Reed with orthopedic surgery for wound coverage.     Todd Aschoff has a fairly large area of wound dehiscence with necrosis and cellulitis will benefit from improved wound care and surgical debridement followed by conversion over to a wound VAC for healthy granulation tissue and possibly a split-thickness skin graft at a later date possibly local tissue rearrangement.    I will plan to take this patient to the operating room on Tuesday for debridement of the eschar and placement of negative pressure wound therapy device.  In the meantime please optimize the patient for the operating room with cessation of his Coumadin and conversion over to Lovenox or heparin.  The patient's wound care should be converted over to Silvadene cream applied twice daily to the eschars on the left lower extremity followed by coverage with Adaptic and Kerlix/Ace wrap.  Please continue to elevate the left lower extremity.  Weightbearing per orthopedic surgery.  No concern for fracture or exposed hardware.      Acute traumatic injuries by imaging: none    Comorbidities:  has a past medical history of Alcohol dependence (H), Allergy, unspecified not elsewhere classified, Antiplatelet or antithrombotic long-term use, Aortic valve prosthesis present, Atrial fibrillation (H), Blood transfusion, BPH (benign prostatic hypertrophy), Chronic infection, Chronic pain, Coagulation disorder (H), Dissection of aorta, thoracic (H) (1992), Headache(784.0), Heart murmur, History of spinal cord injury, Low back pain, Major  depression, Mixed hyperlipidemia, Numbness and tingling, OA (osteoarthritis), Obesity, unspecified, Prostate infection, Sciatica (2002), and Unspecified hypothyroidism.        Plan:   Appreciate consultation by Orthopedic Surgery and Hospitalist Team    Recommend IV Antibiotics with gram + coverage given cellulitis around the wounds     Patient needs correction of INR for possible surgery for preparation of wound with debridement possible application of negative pressure wound therapy device versus full-thickness skin graft.    Optimize Nutrition with nutritional labs including albumin prealbumin and aggressive nutritional repletion with calories and protein including vitamin C and zinc supplementation.        Consults: Wound Care Nursing, Nutrition    Case discussed with consulting provider              Chief Complaint:   Left medial calf wound     History is obtained from the patient.         History of Present Illness:       Todd S Aschoff is a 65 year old  male who presented to the ED after epileptic episode where he was found down in his bedroom.  And had a recent I&D of left calf wound in the setting of a hematoma believed to be in induced by his anticoagulation with Coumadin.  Wound was dehisced and necrotic so plastic surgeon was consulted for management.        Anticoagulation:  Yes      History of syncope:  Yes       Complains of calf pain.      Denies shortness of breath, chest pain, abdominal pain, nausea, emesis, dizziness, visual changes, headache, neck pain, back pain, extremity pain.               Past Medical History:    has a past medical history of Alcohol dependence (H), Allergy, unspecified not elsewhere classified, Antiplatelet or antithrombotic long-term use, Aortic valve prosthesis present, Atrial fibrillation (H), Blood transfusion, BPH (benign prostatic hypertrophy), Chronic infection, Chronic pain, Coagulation disorder (H), Dissection of aorta, thoracic (H) (1992),  Headache(784.0), Heart murmur, History of spinal cord injury, Low back pain, Major depression, Mixed hyperlipidemia, Numbness and tingling, OA (osteoarthritis), Obesity, unspecified, Prostate infection, Sciatica (2002), and Unspecified hypothyroidism.          Past Surgical History:     Past Surgical History:   Procedure Laterality Date     AORTIC VALVE REPLACEMENT  1992    [place] [facility] valve     C TOTAL HIP ARTHROPLASTY  2010    Left AMANDA     C TOTAL HIP ARTHROPLASTY  2002    R hip replacement comp's by nerve injury with pain down into R leg, nadya below knee     CATARACT IOL, RT/LT      rt eye only     CHOLECYSTECTOMY, LAPOROSCOPIC  8/10     COLONOSCOPY N/A 1/15/2015    Procedure: COLONOSCOPY;  Surgeon: Johnson Kothari MD;  Location: [facility]     DECOMPRESSION LUMBAR ONE LEVEL  4/3/2013    Procedure: DECOMPRESSION LUMBAR ONE LEVEL;  Open Decompression L3-4 bilateral;  Surgeon: Travis Coffey MD;  Location: [facility]     DECOMPRESSION, FUSION CERVICAL ANTERIOR ONE LEVEL, COMBINED  3/23/2012    Procedure:COMBINED DECOMPRESSION, FUSION CERVICAL ANTERIOR ONE LEVEL; Anterior Cervical Decompression and Fusion C4-6; Surgeon:TRAVIS COFFEY; Location:[facility] OR     EXPLORE SPINE, REMOVE HARDWARE, COMBINED  5/23/2013    Procedure: COMBINED EXPLORE SPINE, REMOVE HARDWARE;  Exploration Lumbar Wound for fluid collection;  Surgeon: Travis Coffey MD;  Location: [facility]     FUSION CERVICAL ANTERIOR TWO LEVELS  3/26/2012    Procedure:FUSION CERVICAL ANTERIOR TWO LEVELS; Anterior Cervical Fusion C4-6, Anterior Cervical  Hematoma Evacuation; Surgeon:TRAVIS COFFEY; Location:[facility]     IRRIGATION AND DEBRIDEMENT LOWER EXTREMITY, COMBINED Left 1/10/2021    Procedure: 1.  Irrigation and excisional debridement to muscle left distal medial calf chronic wounds total area measuring 9 cm x 4 cm 2.  Irrigation and excisional debridement to fascia left medial calf chronic hematoma measuring 29 x 6.7 x 4.2 cm 3.  Primary complex  wound closure left medial distal calf 9 cm in length;  Surgeon: Rod Kemp MD;  Location: [facility]     IRRIGATION AND DEBRIDEMENT SPINE, CLOSE WOUND, COMBINED  3/26/2012    Procedure:COMBINED IRRIGATION AND DEBRIDEMENT SPINE, CLOSE WOUND; Surgeon:TRAVIS COFFEY; Location:[facility] OR     removal of cyst of back   2.5week     TONSILLECTOMY       wisdom teeth[       ZZC NONSPECIFIC PROCEDURE      repair of TAA with graft            Social History:     Social History     Tobacco Use     Smoking status: Current Every Day Smoker     Packs/day: 0.00     Years: 3.00     Pack years: 0.00     Types: Clove cigarettes or kreteks, Pipe, Cigars     Smokeless tobacco: Never Used   Substance Use Topics     Alcohol use: No     Comment: Stopped drinking alcohol ~             Family History:     Family History   Problem Relation Age of Onset     Cerebrovascular Disease Father          age 86, had M.I. ,diabetic also     Prostate Cancer Father      Cancer Mother          age 83 of dementia, also h/o lymphoma     Hypertension Brother         2 brothers with hypertension & sleep apnea     Hypertension Sister         Born ~1936     Sleep Apnea Brother          age 78, Alzheimers     No Known Problems Son      No Known Problems Daughter      No Known Problems Son      Family History Negative Brother         Had 5 brothers, three still alive as of 2019     Colon Cancer No family hx of             Allergies:     Allergies   Allergen Reactions     Gabapentin      Severe behavioral disturbances     All allergies reviewed and addressed          Medications:   No current facility-administered medications on file prior to encounter.        acetaminophen (TYLENOL) 500 MG tablet, Take 2 tablets (1,000 mg) by mouth 3 times daily       azelastine (ASTELIN) 0.1 % nasal spray, USE 2 SPRAYS IN NOSTRIL 2 TIMES DAILY AS NEEDED.       cetirizine (ZYRTEC) 10 MG tablet, TAKE ONE TABLET BY MOUTH ONCE DAILY AS NEEDED        cyclobenzaprine (FLEXERIL) 10 MG tablet, TAKE ONE TABLET BY MOUTH THREE TIMES DAILY AS NEEDED FOR MUSCLE SPASM.       diazepam (VALIUM) 2 MG tablet, Take 1 tablet (2 mg) by mouth every 12 hours as needed for pain (try not to use over one daily)       donepezil (ARICEPT) 10 MG tablet, Take 2 tablets (20 mg) by mouth At Bedtime Stop if diarrhea or nausea develop       enoxaparin ANTICOAGULANT (LOVENOX ANTICOAGULANT) 150 MG/ML syringe, Inject 0.9 mLs (135 mg) Subcutaneous every 12 hours until INR > 2.5       folic acid (FOLVITE) 1 MG tablet, Take 5 tablets (5 mg) by mouth daily       guanFACINE (TENEX) 1 MG tablet, Take 1 tablet (1 mg) by mouth At Bedtime       levothyroxine (SYNTHROID/LEVOTHROID) 150 MCG tablet, Take 1 tablet (150 mcg) by mouth daily       liothyronine (CYTOMEL) 25 MCG tablet, Take 1 tablet (25 mcg) by mouth daily       melatonin 3 MG tablet, Take 1 tablet (3 mg) by mouth nightly as needed for sleep       memantine (NAMENDA) 10 MG tablet, Take 10 mg by mouth 2 times daily       mirabegron (MYRBETRIQ) 50 MG 24 hr tablet, Take 1 tablet (50 mg) by mouth daily       pregabalin (LYRICA) 100 MG capsule, Take 1 capsule (100 mg) by mouth 3 times daily Do not take if sleepy/sedated.       propafenone (RYTHMOL) 150 MG TABS tablet, Take 1 tablet (150 mg) by mouth every 8 hours       senna-docusate (SENOKOT-S;PERICOLACE) 8.6-50 MG per tablet, Take 1 tablet by mouth 2 times daily as needed for constipation       simvastatin (ZOCOR) 40 MG tablet, Take 1 tablet (40 mg) by mouth At Bedtime. Need to update cholesterol level before additional refills.       triamcinolone (KENALOG) 0.1 % external cream, Apply topically 2 times daily as needed (rash/itching)       venlafaxine (EFFEXOR-XR) 150 MG 24 hr capsule, Take 2 capsules (300 mg) by mouth daily       warfarin ANTICOAGULANT (COUMADIN) 5 MG tablet, Take 1.5 tablets (7.5 mg) every Sun, Thurs; 1 tablet (5 mg) all other days or as directed by INR Clinic.       oxyCODONE  "(ROXICODONE) 5 MG tablet, Take 1 tablet (5 mg) by mouth every 4 hours as needed for breakthrough pain        acetaminophen  1,000 mg Oral TID     donepezil  20 mg Oral At Bedtime     folic acid  5 mg Oral Daily     guanFACINE  1 mg Oral At Bedtime     lamoTRIgine  25 mg Oral Daily     levothyroxine  150 mcg Oral Daily     liothyronine  25 mcg Oral Daily     memantine  10 mg Oral BID     pregabalin  100 mg Oral TID     propafenone  150 mg Oral Q8H     simvastatin  40 mg Oral At Bedtime     sodium chloride (PF)  3 mL Intracatheter Q8H     venlafaxine  300 mg Oral Daily            Review of Systems:   The 10 point review of systems is negative other than noted in the HPI.           Physical Exam:   /76   Pulse 63   Temp 97  F (36.1  C)   Resp 16   Ht 1.854 m (6' 1\")   Wt 140.3 kg (309 lb 3.2 oz)   SpO2 97%   BMI 40.79 kg/m    General appearance: well-nourished, no apparent distress    Head: Head is normocephalic and atraumatic.    Eyes: Pupils are equal and round and reactive to light. Eyes atraumatic. EOM full, no proptosis, no conjunctival injection      Extremities: Left lower extremity with good range of motion at the knee and ankle.  Medial thigh has a approximately 12 cm long medial wound that is approximately 4 cm wide with mixed levels of apparent necrosis.  There is also dehiscence of the suture line with black nylon sutures visible.  There is mild surrounding erythema.  There is no evidence of fluid collection or recurrent hematoma.      Psychiatric: mood and affect are appropriate.             Data:   WBC -   WBC   Date Value Ref Range Status   01/24/2021 6.1 4.0 - 11.0 10e9/L Final   ], HgB -   Hemoglobin   Date Value Ref Range Status   01/24/2021 11.2 (L) 13.3 - 17.7 g/dL Final   ]   Liver Function Studies -   Recent Labs   Lab Test 01/22/21  1105   PROTTOTAL 7.2   ALBUMIN 3.2*   BILITOTAL 0.9   ALKPHOS 106   AST 25   ALT 24         IMAGING:  Results for orders placed or performed during the " hospital encounter of 01/22/21   Head CT w/o contrast    Narrative    CT SCAN OF THE HEAD WITHOUT CONTRAST   1/22/2021 1:06 PM     HISTORY: Head trauma, minor, normal mental status (Age [age]y).    TECHNIQUE:  Axial images of the head and coronal reformations without  IV contrast material. Radiation dose for this scan was reduced using  automated exposure control, adjustment of the mA and/or kV according  to patient size, or iterative reconstruction technique.    COMPARISON: Head CT 5/1/2013    FINDINGS:  New wedge-shaped area of hypoattenuation is present  involving the right lateral occipital lobe with loss of gray-white  differentiation.    Old bilateral cerebellar infarcts. Old infarct at the junction of the  left parietal and occipital lobe. Mild volume loss is present. White  matter hypoattenuation likely represents mild chronic small vessel  ischemic change. No evidence of hemorrhage or significant mass effect.    The visualized calvarium, tympanic cavities, and mastoid cavities are  unremarkable.      Impression    IMPRESSION:   1. New area of hypoattenuation involving the right occipital lobe,  suspicious for infarct, potentially subacute.  2. No evidence of hemorrhage.  3. Multiple old infarcts.    BRADLEY ROMO MD   XR Chest Port 1 View    Narrative    CHEST PORTABLE ONE VIEW   1/22/2021 12:01 PM     HISTORY: Weakness.    COMPARISON: Chest x-ray 3/6/2017.      Impression    IMPRESSION: Subtle new patchy opacities at the left lung base compared  to the prior exam. Cannot exclude developing subtle airspace disease  including potentially pneumonia. Right lung appears clear. Stable  elevated right hemidiaphragm. Stable cardiac silhouette and  sternotomy.    RAYO CHIN MD   XR Tibia & Fibula Port Left 2 Views    Narrative    XR TIBIA & FIBULA PORT LT 2 VW 1/22/2021 12:00 PM     HISTORY: WOUND      Impression    IMPRESSION: No evidence of tibial or fibular osseous destruction or  fracture. Extensive  atherosclerotic calcification is noted throughout  the leg.    MAITE BERNABE MD   MRA Neck (Carotids) wo & w Contrast    Narrative    EXAM: MRA NECK (CAROTIDS) WO AND W CONTRAST, MRA BRAIN ([place]) WO CONTRAST  LOCATION: [facility]  DATE/TIME: 1/22/2021 7:38 PM    INDICATION: Stroke, follow up.  COMPARISON: Brain MR from today. Head CT from today. Head CT 7/18/2018.  CONTRAST: 10 mL Gadavist.  TECHNIQUE:   1) 3D time-of-flight head MRA without intravenous contrast.  2) Neck MRA without and with IV contrast.    FINDINGS:  HEAD MRA:   ANTERIOR CIRCULATION: No stenosis/occlusion, aneurysm, or high flow vascular malformation. Standard [place] anatomy.    POSTERIOR CIRCULATION: There is tapering of the distal aspect of the left posterior cerebral artery in the P2-P3 junction region which may relate to diffuse stenosis. Note made that there was no acute infarct on comparison brain MR in this vascular   territory. Balanced vertebral arteries supply a normal basilar artery.     NECK MRA:   RIGHT CAROTID: No measurable stenosis or dissection.    LEFT CAROTID: No measurable stenosis or dissection.    VERTEBRAL ARTERIES: No focal stenosis or dissection. The left vertebral artery is slightly dominant. There is mild diffuse stenosis in the distal most aspects of the right vertebral artery.    AORTIC ARCH: Common origin of the brachiocephalic and left common carotid artery, normal variant. No great vessel origin stenosis.      Impression    IMPRESSION:    HEAD MRA:   1.  Tapering of the distal aspect of the left posterior cerebral artery may relate to diffuse stenosis. This is unlikely to reflect acute vessel occlusion in this vessel as there was no associated acute infarct in the left PCA vascular territory.    NECK MRA:  1.  Mild diffuse stenosis distal most aspects right vertebral artery. No hemodynamically significant extracranial stenosis.   MRA Brain ([place]) wo Contrast     Narrative    EXAM: MRA NECK (CAROTIDS) WO AND W CONTRAST, MRA BRAIN ([place]) WO CONTRAST  LOCATION: [facility]  DATE/TIME: 1/22/2021 7:38 PM    INDICATION: Stroke, follow up.  COMPARISON: Brain MR from today. Head CT from today. Head CT 7/18/2018.  CONTRAST: 10 mL Gadavist.  TECHNIQUE:   1) 3D time-of-flight head MRA without intravenous contrast.  2) Neck MRA without and with IV contrast.    FINDINGS:  HEAD MRA:   ANTERIOR CIRCULATION: No stenosis/occlusion, aneurysm, or high flow vascular malformation. Standard [place] anatomy.    POSTERIOR CIRCULATION: There is tapering of the distal aspect of the left posterior cerebral artery in the P2-P3 junction region which may relate to diffuse stenosis. Note made that there was no acute infarct on comparison brain MR in this vascular   territory. Balanced vertebral arteries supply a normal basilar artery.     NECK MRA:   RIGHT CAROTID: No measurable stenosis or dissection.    LEFT CAROTID: No measurable stenosis or dissection.    VERTEBRAL ARTERIES: No focal stenosis or dissection. The left vertebral artery is slightly dominant. There is mild diffuse stenosis in the distal most aspects of the right vertebral artery.    AORTIC ARCH: Common origin of the brachiocephalic and left common carotid artery, normal variant. No great vessel origin stenosis.      Impression    IMPRESSION:    HEAD MRA:   1.  Tapering of the distal aspect of the left posterior cerebral artery may relate to diffuse stenosis. This is unlikely to reflect acute vessel occlusion in this vessel as there was no associated acute infarct in the left PCA vascular territory.    NECK MRA:  1.  Mild diffuse stenosis distal most aspects right vertebral artery. No hemodynamically significant extracranial stenosis.   MR Brain w/o & w Contrast    Narrative    EXAM: MR BRAIN W/O and W CONTRAST  LOCATION: [facility]  DATE/TIME: 1/22/2021 7:38 PM    INDICATION: Stroke, follow  up.  COMPARISON: Brain MR and MRA [place] from today. Head CT from today. Head CT 7/18/2018.  CONTRAST: 10 mL Gadavist  TECHNIQUE: Routine multiplanar multisequence head MRI without and with intravenous contrast.    FINDINGS:  INTRACRANIAL CONTENTS: There is a 3 mm rounded focus of restricted diffusion at the right frontal vertex, involving the region of the right middle frontal gyrus, see image 48 of series 2.2 with associated abnormal FLAIR signal consistent with a tiny   punctate infarct in this location. No other definite evidence of other acute infarct. Chronic appearing right occipital infarct with associated T1 hyperintense signal consistent with some laminar necrosis. There is a focal region of encephalomalacia   involving the left parieto-occipital junction consistent with old infarct in this location. Bilateral cerebellar hemispheric infarcts also noted. No mass, acute hemorrhage, or extra-axial fluid collection. There are a couple punctate foci of   susceptibility, one within the white matter of the right frontal lobe on image 21 of series 4 and another within the left posterior temporal lobe on image 15 of series 4 which may relate to old foci of hemosiderin deposition. Patchy nonspecific T2/FLAIR   hyperintensities within the cerebral white matter most consistent with mild to moderate chronic microvascular ischemic change. Mild generalized cerebral atrophy. No hydrocephalus. Normal position of the cerebellar tonsils. Left cerebellar hemispheric   DVA. No other pathologic contrast enhancement.    SELLA: No abnormality accounting for technique.    OSSEOUS STRUCTURES/SOFT TISSUES: Normal marrow signal. The major intracranial vascular flow voids are maintained.     ORBITS: No acute orbital abnormality.     SINUSES/MASTOIDS: No paranasal sinus mucosal disease. No middle ear or mastoid effusion.       Impression    IMPRESSION:  1.  3 mm focus of restricted diffusion at the right frontal vertex,  involving the right middle frontal gyrus region without associated mass effect or hemorrhage consistent with acute/subacute infarct. No mass effect.    2.  Numerous other chronic appearing infarcts, with dominant infarct present in the right occipital lobe associated with laminar necrosis. Additional bilateral cerebellar hemispheric infarct also present.       Echocardiogram Complete    Narrative    [id number]  [id number]  [id number]  [id number]^MICHAEL^DONNA^TOM           [facility]  Echocardiography Laboratory  [address] [place] [address]        Name: ASCHOFF, TODD S  MRN: [id number]  : 1955  Study Date: 2021 01:40 PM  Age: 65 yrs  Gender: Male  Patient Location: [facility]  Reason For Study: CVA  Ordering Physician: DONNA RODRIGUEZ  Performed By: Naina Liu     BSA: 2.6 m2  Height: 73 in  Weight: 309 lb  HR: 70  BP: 121/71 mmHg  _____________________________________________________________________________  __        Procedure  Complete Portable Bubble Echo Adult. [employer] (NDC #2829-0148) given  intravenously. Technically difficult study.Extremely difficult acoustic  windows despite the use of contrast for endcardial border definition.  _____________________________________________________________________________  __        Interpretation Summary     Left ventricular systolic function is normal.  The visual ejection fraction is estimated at 55-60%.  S/P mechanical aortic valve replacement with conduit graft graft  The anterior portion of aortic root appears thickened, unchanged when directly  compared to prior study from 2018.  The prosthetic aortic valve and leaflets are not well visualized.  The gradient is normal for this prosthetic aortic valve. (Mean gradient 11mm)  The study was technically difficult.  _____________________________________________________________________________  __        Left Ventricle  The left ventricle is normal in size. There is concentric remodeling  present.  Left ventricular systolic function is normal. The visual ejection fraction is  estimated at 55-60%. Grade II or moderate diastolic dysfunction. No regional  wall motion abnormalities noted.     Right Ventricle  The right ventricle is normal size. The right ventricle is not well  visualized.     Atria  The left atrium is mildly dilated. LA volume appears to be underestimated.  Right atrium not well visualized. There is no color Doppler evidence of an  atrial shunt. A contrast injection (Bubble Study) was performed that was  negative for flow across the interatrial septum.     Mitral Valve  There is mild mitral annular calcification. There is trace mitral  regurgitation.        Tricuspid Valve  There is mild (1+) tricuspid regurgitation. The right ventricular systolic  pressure is approximated at 29.2 mmHg plus the right atrial pressure. IVC  diameter >2.1 cm collapsing <50% with sniff suggests a high RA pressure  estimated at 15 mmHg or greater.     Aortic Valve  There is trace aortic regurgitation. The mean AoV pressure gradient is 11.0  mmHg. There is a mechanical aortic valve. The prosthetic aortic valve is not  well visualized. The gradient is normal for this prosthetic aortic valve.     Pulmonic Valve  There is mild (1+) pulmonic valvular regurgitation. Right ventricular  diastolic pressure is approximated at 5mmHg plus the right atrial pressure.     Vessels  The aortic root is normal size.     Pericardium  There is no pericardial effusion.        Rhythm  Sinus rhythm was noted.  _____________________________________________________________________________  __  MMode/2D Measurements & Calculations     IVSd: 1.5 cm  LVIDd: 4.2 cm  LVIDs: 2.7 cm  LVPWd: 1.3 cm  FS: 36.5 %  LV mass(C)d: 224.1 grams  LV mass(C)dI: 86.6 grams/m2  LA dimension: 5.2 cm  asc Aorta Diam: 3.0 cm  LVOT diam: 2.3 cm  LVOT area: 4.1 cm2  LA Volume (BP): 56.0 ml  LA Volume Index (BP): 21.6 ml/m2  RWT: 0.59        Time  Measurements  Aortic HR: 71.0 BPM     Doppler Measurements & Calculations  MV E max enrike: 125.9 cm/sec  MV A max enrike: 69.6 cm/sec  MV E/A: 1.8  MV dec time: 0.17 sec  Ao V2 max: 226.7 cm/sec  Ao max P.0 mmHg  Ao V2 mean: 157.1 cm/sec  Ao mean P.0 mmHg  Ao V2 VTI: 41.5 cm  SHAWN(I,D): 1.7 cm2  SHAWN(V,D): 1.4 cm2  LV V1 max P.4 mmHg  LV V1 max: 78.0 cm/sec  LV V1 VTI: 17.1 cm  CO(LVOT): 5.0 l/min  CI(LVOT): 1.9 l/min/m2  SV(LVOT): 70.9 ml  SI(LVOT): 27.4 ml/m2  PA acc time: 0.15 sec  PI end-d enrike: 111.3 cm/sec  TR max enrike: 270.1 cm/sec  TR max P.2 mmHg  AV Enrike Ratio (DI): 0.34  SHAWN Index (cm2/m2): 0.66  E/E' av.6     Lateral E/e': 14.8  Medial E/e': 24.4           _____________________________________________________________________________  __           Report approved by: Nu Bañuelos 2021 04:04 PM        *Note: Due to a large number of results and/or encounters for the requested time period, some results have not been displayed. A complete set of results can be found in Results Review.       This note was created using voice recognition software. Undetected word substitutions or other errors may have occurred.     Lazaro Plascencia MD    Time spent with the patient, reviewing the EMR, reviewing laboratory and imaging studies, more than 50% of which was counseling and coordinating care:   60 minutes.

## 2021-01-24 NOTE — CONSULTS
Consults     [facility]   Plastic Surgery Consultation            Assessment and Plan:   Assessment:   Todd S Aschoff is a 65 year old male with a history of atrial fibrillation on Coumadin who was admitted on January 22, 2021.  He is also 2 weeks status post I&D of his left medial lower extremity due to a Coumadin induced hematoma.  Bedside underwent necrosis and dehiscence and plastic surgery was consulted by Dr. Reed with orthopedic surgery for wound coverage.     Todd Aschoff has a fairly large area of wound dehiscence with necrosis and cellulitis will benefit from improved wound care and surgical debridement followed by conversion over to a wound VAC for healthy granulation tissue and possibly a split-thickness skin graft at a later date possibly local tissue rearrangement.    I will plan to take this patient to the operating room on Tuesday for debridement of the eschar and placement of negative pressure wound therapy device.  In the meantime please optimize the patient for the operating room with cessation of his Coumadin and conversion over to Lovenox or heparin.  The patient's wound care should be converted over to Silvadene cream applied twice daily to the eschars on the left lower extremity followed by coverage with Adaptic and Kerlix/Ace wrap.  Please continue to elevate the left lower extremity.  Weightbearing per orthopedic surgery.  No concern for fracture or exposed hardware.      Acute traumatic injuries by imaging: none    Comorbidities:  has a past medical history of Alcohol dependence (H), Allergy, unspecified not elsewhere classified, Antiplatelet or antithrombotic long-term use, Aortic valve prosthesis present, Atrial fibrillation (H), Blood transfusion, BPH (benign prostatic hypertrophy), Chronic infection, Chronic pain, Coagulation disorder (H), Dissection of aorta, thoracic (H) (1992), Headache(784.0), Heart murmur, History of spinal cord injury, Low back pain, Major  depression, Mixed hyperlipidemia, Numbness and tingling, OA (osteoarthritis), Obesity, unspecified, Prostate infection, Sciatica (2002), and Unspecified hypothyroidism.        Plan:   Appreciate consultation by Orthopedic Surgery and Hospitalist Team    Recommend IV Antibiotics with gram + coverage given cellulitis around the wounds     Patient needs correction of INR for possible surgery for preparation of wound with debridement possible application of negative pressure wound therapy device versus full-thickness skin graft.    Optimize Nutrition with nutritional labs including albumin prealbumin and aggressive nutritional repletion with calories and protein including vitamin C and zinc supplementation.        Consults: Wound Care Nursing, Nutrition    Case discussed with consulting provider              Chief Complaint:   Left medial calf wound     History is obtained from the patient.         History of Present Illness:       Todd S Aschoff is a 65 year old  male who presented to the ED after epileptic episode where he was found down in his bedroom.  And had a recent I&D of left calf wound in the setting of a hematoma believed to be in induced by his anticoagulation with Coumadin.  Wound was dehisced and necrotic so plastic surgeon was consulted for management.        Anticoagulation:  Yes      History of syncope:  Yes       Complains of calf pain.      Denies shortness of breath, chest pain, abdominal pain, nausea, emesis, dizziness, visual changes, headache, neck pain, back pain, extremity pain.               Past Medical History:    has a past medical history of Alcohol dependence (H), Allergy, unspecified not elsewhere classified, Antiplatelet or antithrombotic long-term use, Aortic valve prosthesis present, Atrial fibrillation (H), Blood transfusion, BPH (benign prostatic hypertrophy), Chronic infection, Chronic pain, Coagulation disorder (H), Dissection of aorta, thoracic (H) (1992),  Headache(784.0), Heart murmur, History of spinal cord injury, Low back pain, Major depression, Mixed hyperlipidemia, Numbness and tingling, OA (osteoarthritis), Obesity, unspecified, Prostate infection, Sciatica (2002), and Unspecified hypothyroidism.          Past Surgical History:     Past Surgical History:   Procedure Laterality Date     AORTIC VALVE REPLACEMENT  1992    [place] [facility] valve     C TOTAL HIP ARTHROPLASTY  2010    Left AMANDA     C TOTAL HIP ARTHROPLASTY  2002    R hip replacement comp's by nerve injury with pain down into R leg, nadya below knee     CATARACT IOL, RT/LT      rt eye only     CHOLECYSTECTOMY, LAPOROSCOPIC  8/10     COLONOSCOPY N/A 1/15/2015    Procedure: COLONOSCOPY;  Surgeon: Johnson Kothari MD;  Location: [facility]     DECOMPRESSION LUMBAR ONE LEVEL  4/3/2013    Procedure: DECOMPRESSION LUMBAR ONE LEVEL;  Open Decompression L3-4 bilateral;  Surgeon: Travis Coffey MD;  Location: [facility]     DECOMPRESSION, FUSION CERVICAL ANTERIOR ONE LEVEL, COMBINED  3/23/2012    Procedure:COMBINED DECOMPRESSION, FUSION CERVICAL ANTERIOR ONE LEVEL; Anterior Cervical Decompression and Fusion C4-6; Surgeon:TRAVIS COFFEY; Location:[facility]     EXPLORE SPINE, REMOVE HARDWARE, COMBINED  5/23/2013    Procedure: COMBINED EXPLORE SPINE, REMOVE HARDWARE;  Exploration Lumbar Wound for fluid collection;  Surgeon: Travis Coffey MD;  Location: [facility]     FUSION CERVICAL ANTERIOR TWO LEVELS  3/26/2012    Procedure:FUSION CERVICAL ANTERIOR TWO LEVELS; Anterior Cervical Fusion C4-6, Anterior Cervical  Hematoma Evacuation; Surgeon:TRAVIS COFFEY; Location:[facility]     IRRIGATION AND DEBRIDEMENT LOWER EXTREMITY, COMBINED Left 1/10/2021    Procedure: 1.  Irrigation and excisional debridement to muscle left distal medial calf chronic wounds total area measuring 9 cm x 4 cm 2.  Irrigation and excisional debridement to fascia left medial calf chronic hematoma measuring 29 x 6.7 x 4.2 cm 3.  Primary complex  wound closure left medial distal calf 9 cm in length;  Surgeon: Rod Kemp MD;  Location: [facility]     IRRIGATION AND DEBRIDEMENT SPINE, CLOSE WOUND, COMBINED  3/26/2012    Procedure:COMBINED IRRIGATION AND DEBRIDEMENT SPINE, CLOSE WOUND; Surgeon:TRAVIS COFFEY; Location:[facility]     removal of cyst of back   2.5week     TONSILLECTOMY       wisdom teeth[       ZZC NONSPECIFIC PROCEDURE      repair of TAA with graft            Social History:     Social History     Tobacco Use     Smoking status: Current Every Day Smoker     Packs/day: 0.00     Years: 3.00     Pack years: 0.00     Types: Clove cigarettes or kreteks, Pipe, Cigars     Smokeless tobacco: Never Used   Substance Use Topics     Alcohol use: No     Comment: Stopped drinking alcohol ~             Family History:     Family History   Problem Relation Age of Onset     Cerebrovascular Disease Father          age 86, had M.I. ,diabetic also     Prostate Cancer Father      Cancer Mother          age 83 of dementia, also h/o lymphoma     Hypertension Brother         2 brothers with hypertension & sleep apnea     Hypertension Sister         Born ~1936     Sleep Apnea Brother          age 78, Alzheimers     No Known Problems Son      No Known Problems Daughter      No Known Problems Son      Family History Negative Brother         Had 5 brothers, three still alive as of 2019     Colon Cancer No family hx of             Allergies:     Allergies   Allergen Reactions     Gabapentin      Severe behavioral disturbances     All allergies reviewed and addressed          Medications:   No current facility-administered medications on file prior to encounter.        acetaminophen (TYLENOL) 500 MG tablet, Take 2 tablets (1,000 mg) by mouth 3 times daily       azelastine (ASTELIN) 0.1 % nasal spray, USE 2 SPRAYS IN NOSTRIL 2 TIMES DAILY AS NEEDED.       cetirizine (ZYRTEC) 10 MG tablet, TAKE ONE TABLET BY MOUTH ONCE DAILY AS NEEDED        cyclobenzaprine (FLEXERIL) 10 MG tablet, TAKE ONE TABLET BY MOUTH THREE TIMES DAILY AS NEEDED FOR MUSCLE SPASM.       diazepam (VALIUM) 2 MG tablet, Take 1 tablet (2 mg) by mouth every 12 hours as needed for pain (try not to use over one daily)       donepezil (ARICEPT) 10 MG tablet, Take 2 tablets (20 mg) by mouth At Bedtime Stop if diarrhea or nausea develop       enoxaparin ANTICOAGULANT (LOVENOX ANTICOAGULANT) 150 MG/ML syringe, Inject 0.9 mLs (135 mg) Subcutaneous every 12 hours until INR > 2.5       folic acid (FOLVITE) 1 MG tablet, Take 5 tablets (5 mg) by mouth daily       guanFACINE (TENEX) 1 MG tablet, Take 1 tablet (1 mg) by mouth At Bedtime       levothyroxine (SYNTHROID/LEVOTHROID) 150 MCG tablet, Take 1 tablet (150 mcg) by mouth daily       liothyronine (CYTOMEL) 25 MCG tablet, Take 1 tablet (25 mcg) by mouth daily       melatonin 3 MG tablet, Take 1 tablet (3 mg) by mouth nightly as needed for sleep       memantine (NAMENDA) 10 MG tablet, Take 10 mg by mouth 2 times daily       mirabegron (MYRBETRIQ) 50 MG 24 hr tablet, Take 1 tablet (50 mg) by mouth daily       pregabalin (LYRICA) 100 MG capsule, Take 1 capsule (100 mg) by mouth 3 times daily Do not take if sleepy/sedated.       propafenone (RYTHMOL) 150 MG TABS tablet, Take 1 tablet (150 mg) by mouth every 8 hours       senna-docusate (SENOKOT-S;PERICOLACE) 8.6-50 MG per tablet, Take 1 tablet by mouth 2 times daily as needed for constipation       simvastatin (ZOCOR) 40 MG tablet, Take 1 tablet (40 mg) by mouth At Bedtime. Need to update cholesterol level before additional refills.       triamcinolone (KENALOG) 0.1 % external cream, Apply topically 2 times daily as needed (rash/itching)       venlafaxine (EFFEXOR-XR) 150 MG 24 hr capsule, Take 2 capsules (300 mg) by mouth daily       warfarin ANTICOAGULANT (COUMADIN) 5 MG tablet, Take 1.5 tablets (7.5 mg) every Sun, Thurs; 1 tablet (5 mg) all other days or as directed by INR Clinic.       oxyCODONE  "(ROXICODONE) 5 MG tablet, Take 1 tablet (5 mg) by mouth every 4 hours as needed for breakthrough pain        acetaminophen  1,000 mg Oral TID     donepezil  20 mg Oral At Bedtime     folic acid  5 mg Oral Daily     guanFACINE  1 mg Oral At Bedtime     lamoTRIgine  25 mg Oral Daily     levothyroxine  150 mcg Oral Daily     liothyronine  25 mcg Oral Daily     memantine  10 mg Oral BID     pregabalin  100 mg Oral TID     propafenone  150 mg Oral Q8H     simvastatin  40 mg Oral At Bedtime     sodium chloride (PF)  3 mL Intracatheter Q8H     venlafaxine  300 mg Oral Daily            Review of Systems:   The 10 point review of systems is negative other than noted in the HPI.           Physical Exam:   /76   Pulse 63   Temp 97  F (36.1  C)   Resp 16   Ht 1.854 m (6' 1\")   Wt 140.3 kg (309 lb 3.2 oz)   SpO2 97%   BMI 40.79 kg/m    General appearance: well-nourished, no apparent distress    Head: Head is normocephalic and atraumatic.    Eyes: Pupils are equal and round and reactive to light. Eyes atraumatic. EOM full, no proptosis, no conjunctival injection      Extremities: Left lower extremity with good range of motion at the knee and ankle.  Medial thigh has a approximately 12 cm long medial wound that is approximately 4 cm wide with mixed levels of apparent necrosis.  There is also dehiscence of the suture line with black nylon sutures visible.  There is mild surrounding erythema.  There is no evidence of fluid collection or recurrent hematoma.      Psychiatric: mood and affect are appropriate.             Data:   WBC -   WBC   Date Value Ref Range Status   01/24/2021 6.1 4.0 - 11.0 10e9/L Final   ], HgB -   Hemoglobin   Date Value Ref Range Status   01/24/2021 11.2 (L) 13.3 - 17.7 g/dL Final   ]   Liver Function Studies -   Recent Labs   Lab Test 01/22/21  1105   PROTTOTAL 7.2   ALBUMIN 3.2*   BILITOTAL 0.9   ALKPHOS 106   AST 25   ALT 24         IMAGING:  Results for orders placed or performed during the " hospital encounter of 01/22/21   Head CT w/o contrast    Narrative    CT SCAN OF THE HEAD WITHOUT CONTRAST   1/22/2021 1:06 PM     HISTORY: Head trauma, minor, normal mental status (Age [age]y).    TECHNIQUE:  Axial images of the head and coronal reformations without  IV contrast material. Radiation dose for this scan was reduced using  automated exposure control, adjustment of the mA and/or kV according  to patient size, or iterative reconstruction technique.    COMPARISON: Head CT 5/1/2013    FINDINGS:  New wedge-shaped area of hypoattenuation is present  involving the right lateral occipital lobe with loss of gray-white  differentiation.    Old bilateral cerebellar infarcts. Old infarct at the junction of the  left parietal and occipital lobe. Mild volume loss is present. White  matter hypoattenuation likely represents mild chronic small vessel  ischemic change. No evidence of hemorrhage or significant mass effect.    The visualized calvarium, tympanic cavities, and mastoid cavities are  unremarkable.      Impression    IMPRESSION:   1. New area of hypoattenuation involving the right occipital lobe,  suspicious for infarct, potentially subacute.  2. No evidence of hemorrhage.  3. Multiple old infarcts.    BRADLEY ROMO MD   XR Chest Port 1 View    Narrative    CHEST PORTABLE ONE VIEW   1/22/2021 12:01 PM     HISTORY: Weakness.    COMPARISON: Chest x-ray 3/6/2017.      Impression    IMPRESSION: Subtle new patchy opacities at the left lung base compared  to the prior exam. Cannot exclude developing subtle airspace disease  including potentially pneumonia. Right lung appears clear. Stable  elevated right hemidiaphragm. Stable cardiac silhouette and  sternotomy.    RAYO CHIN MD   XR Tibia & Fibula Port Left 2 Views    Narrative    XR TIBIA & FIBULA PORT LT 2 VW 1/22/2021 12:00 PM     HISTORY: WOUND      Impression    IMPRESSION: No evidence of tibial or fibular osseous destruction or  fracture. Extensive  atherosclerotic calcification is noted throughout  the leg.    MAITE BERNABE MD   MRA Neck (Carotids) wo & w Contrast    Narrative    EXAM: MRA NECK (CAROTIDS) WO AND W CONTRAST, MRA BRAIN ([place]) WO CONTRAST  LOCATION: [facility]  DATE/TIME: 1/22/2021 7:38 PM    INDICATION: Stroke, follow up.  COMPARISON: Brain MR from today. Head CT from today. Head CT 7/18/2018.  CONTRAST: 10 mL Gadavist.  TECHNIQUE:   1) 3D time-of-flight head MRA without intravenous contrast.  2) Neck MRA without and with IV contrast.    FINDINGS:  HEAD MRA:   ANTERIOR CIRCULATION: No stenosis/occlusion, aneurysm, or high flow vascular malformation. Standard [place] anatomy.    POSTERIOR CIRCULATION: There is tapering of the distal aspect of the left posterior cerebral artery in the P2-P3 junction region which may relate to diffuse stenosis. Note made that there was no acute infarct on comparison brain MR in this vascular   territory. Balanced vertebral arteries supply a normal basilar artery.     NECK MRA:   RIGHT CAROTID: No measurable stenosis or dissection.    LEFT CAROTID: No measurable stenosis or dissection.    VERTEBRAL ARTERIES: No focal stenosis or dissection. The left vertebral artery is slightly dominant. There is mild diffuse stenosis in the distal most aspects of the right vertebral artery.    AORTIC ARCH: Common origin of the brachiocephalic and left common carotid artery, normal variant. No great vessel origin stenosis.      Impression    IMPRESSION:    HEAD MRA:   1.  Tapering of the distal aspect of the left posterior cerebral artery may relate to diffuse stenosis. This is unlikely to reflect acute vessel occlusion in this vessel as there was no associated acute infarct in the left PCA vascular territory.    NECK MRA:  1.  Mild diffuse stenosis distal most aspects right vertebral artery. No hemodynamically significant extracranial stenosis.   MRA Brain ([place]) wo Contrast     Narrative    EXAM: MRA NECK (CAROTIDS) WO AND W CONTRAST, MRA BRAIN ([place]) WO CONTRAST  LOCATION: [facility]  DATE/TIME: 1/22/2021 7:38 PM    INDICATION: Stroke, follow up.  COMPARISON: Brain MR from today. Head CT from today. Head CT 7/18/2018.  CONTRAST: 10 mL Gadavist.  TECHNIQUE:   1) 3D time-of-flight head MRA without intravenous contrast.  2) Neck MRA without and with IV contrast.    FINDINGS:  HEAD MRA:   ANTERIOR CIRCULATION: No stenosis/occlusion, aneurysm, or high flow vascular malformation. Standard [place] anatomy.    POSTERIOR CIRCULATION: There is tapering of the distal aspect of the left posterior cerebral artery in the P2-P3 junction region which may relate to diffuse stenosis. Note made that there was no acute infarct on comparison brain MR in this vascular   territory. Balanced vertebral arteries supply a normal basilar artery.     NECK MRA:   RIGHT CAROTID: No measurable stenosis or dissection.    LEFT CAROTID: No measurable stenosis or dissection.    VERTEBRAL ARTERIES: No focal stenosis or dissection. The left vertebral artery is slightly dominant. There is mild diffuse stenosis in the distal most aspects of the right vertebral artery.    AORTIC ARCH: Common origin of the brachiocephalic and left common carotid artery, normal variant. No great vessel origin stenosis.      Impression    IMPRESSION:    HEAD MRA:   1.  Tapering of the distal aspect of the left posterior cerebral artery may relate to diffuse stenosis. This is unlikely to reflect acute vessel occlusion in this vessel as there was no associated acute infarct in the left PCA vascular territory.    NECK MRA:  1.  Mild diffuse stenosis distal most aspects right vertebral artery. No hemodynamically significant extracranial stenosis.   MR Brain w/o & w Contrast    Narrative    EXAM: MR BRAIN W/O and W CONTRAST  LOCATION: [facility]  DATE/TIME: 1/22/2021 7:38 PM    INDICATION: Stroke, follow  up.  COMPARISON: Brain MR and MRA [place] from today. Head CT from today. Head CT 7/18/2018.  CONTRAST: 10 mL Gadavist  TECHNIQUE: Routine multiplanar multisequence head MRI without and with intravenous contrast.    FINDINGS:  INTRACRANIAL CONTENTS: There is a 3 mm rounded focus of restricted diffusion at the right frontal vertex, involving the region of the right middle frontal gyrus, see image 48 of series 2.2 with associated abnormal FLAIR signal consistent with a tiny   punctate infarct in this location. No other definite evidence of other acute infarct. Chronic appearing right occipital infarct with associated T1 hyperintense signal consistent with some laminar necrosis. There is a focal region of encephalomalacia   involving the left parieto-occipital junction consistent with old infarct in this location. Bilateral cerebellar hemispheric infarcts also noted. No mass, acute hemorrhage, or extra-axial fluid collection. There are a couple punctate foci of   susceptibility, one within the white matter of the right frontal lobe on image 21 of series 4 and another within the left posterior temporal lobe on image 15 of series 4 which may relate to old foci of hemosiderin deposition. Patchy nonspecific T2/FLAIR   hyperintensities within the cerebral white matter most consistent with mild to moderate chronic microvascular ischemic change. Mild generalized cerebral atrophy. No hydrocephalus. Normal position of the cerebellar tonsils. Left cerebellar hemispheric   DVA. No other pathologic contrast enhancement.    SELLA: No abnormality accounting for technique.    OSSEOUS STRUCTURES/SOFT TISSUES: Normal marrow signal. The major intracranial vascular flow voids are maintained.     ORBITS: No acute orbital abnormality.     SINUSES/MASTOIDS: No paranasal sinus mucosal disease. No middle ear or mastoid effusion.       Impression    IMPRESSION:  1.  3 mm focus of restricted diffusion at the right frontal vertex,  involving the right middle frontal gyrus region without associated mass effect or hemorrhage consistent with acute/subacute infarct. No mass effect.    2.  Numerous other chronic appearing infarcts, with dominant infarct present in the right occipital lobe associated with laminar necrosis. Additional bilateral cerebellar hemispheric infarct also present.       Echocardiogram Complete    Narrative    [id number]  [id number]  [id number]  [id number]^MICHAEL^DONNA^TOM           [facility]  Echocardiography Laboratory  [address] [place] [address]        Name: ASCHOFF, TODD S  MRN: [id number]  : 1955  Study Date: 2021 01:40 PM  Age: 65 yrs  Gender: Male  Patient Location: [facility]  Reason For Study: CVA  Ordering Physician: DONNA RODRIGUEZ  Performed By: Naina Liu     BSA: 2.6 m2  Height: 73 in  Weight: 309 lb  HR: 70  BP: 121/71 mmHg  _____________________________________________________________________________  __        Procedure  Complete Portable Bubble Echo Adult. [employer] (NDC #7863-6677) given  intravenously. Technically difficult study.Extremely difficult acoustic  windows despite the use of contrast for endcardial border definition.  _____________________________________________________________________________  __        Interpretation Summary     Left ventricular systolic function is normal.  The visual ejection fraction is estimated at 55-60%.  S/P mechanical aortic valve replacement with conduit graft graft  The anterior portion of aortic root appears thickened, unchanged when directly  compared to prior study from 2018.  The prosthetic aortic valve and leaflets are not well visualized.  The gradient is normal for this prosthetic aortic valve. (Mean gradient 11mm)  The study was technically difficult.  _____________________________________________________________________________  __        Left Ventricle  The left ventricle is normal in size. There is concentric remodeling  present.  Left ventricular systolic function is normal. The visual ejection fraction is  estimated at 55-60%. Grade II or moderate diastolic dysfunction. No regional  wall motion abnormalities noted.     Right Ventricle  The right ventricle is normal size. The right ventricle is not well  visualized.     Atria  The left atrium is mildly dilated. LA volume appears to be underestimated.  Right atrium not well visualized. There is no color Doppler evidence of an  atrial shunt. A contrast injection (Bubble Study) was performed that was  negative for flow across the interatrial septum.     Mitral Valve  There is mild mitral annular calcification. There is trace mitral  regurgitation.        Tricuspid Valve  There is mild (1+) tricuspid regurgitation. The right ventricular systolic  pressure is approximated at 29.2 mmHg plus the right atrial pressure. IVC  diameter >2.1 cm collapsing <50% with sniff suggests a high RA pressure  estimated at 15 mmHg or greater.     Aortic Valve  There is trace aortic regurgitation. The mean AoV pressure gradient is 11.0  mmHg. There is a mechanical aortic valve. The prosthetic aortic valve is not  well visualized. The gradient is normal for this prosthetic aortic valve.     Pulmonic Valve  There is mild (1+) pulmonic valvular regurgitation. Right ventricular  diastolic pressure is approximated at 5mmHg plus the right atrial pressure.     Vessels  The aortic root is normal size.     Pericardium  There is no pericardial effusion.        Rhythm  Sinus rhythm was noted.  _____________________________________________________________________________  __  MMode/2D Measurements & Calculations     IVSd: 1.5 cm  LVIDd: 4.2 cm  LVIDs: 2.7 cm  LVPWd: 1.3 cm  FS: 36.5 %  LV mass(C)d: 224.1 grams  LV mass(C)dI: 86.6 grams/m2  LA dimension: 5.2 cm  asc Aorta Diam: 3.0 cm  LVOT diam: 2.3 cm  LVOT area: 4.1 cm2  LA Volume (BP): 56.0 ml  LA Volume Index (BP): 21.6 ml/m2  RWT: 0.59        Time  Measurements  Aortic HR: 71.0 BPM     Doppler Measurements & Calculations  MV E max enrike: 125.9 cm/sec  MV A max enrike: 69.6 cm/sec  MV E/A: 1.8  MV dec time: 0.17 sec  Ao V2 max: 226.7 cm/sec  Ao max P.0 mmHg  Ao V2 mean: 157.1 cm/sec  Ao mean P.0 mmHg  Ao V2 VTI: 41.5 cm  SHAWN(I,D): 1.7 cm2  SHAWN(V,D): 1.4 cm2  LV V1 max P.4 mmHg  LV V1 max: 78.0 cm/sec  LV V1 VTI: 17.1 cm  CO(LVOT): 5.0 l/min  CI(LVOT): 1.9 l/min/m2  SV(LVOT): 70.9 ml  SI(LVOT): 27.4 ml/m2  PA acc time: 0.15 sec  PI end-d enrike: 111.3 cm/sec  TR max enrike: 270.1 cm/sec  TR max P.2 mmHg  AV Enrike Ratio (DI): 0.34  SHAWN Index (cm2/m2): 0.66  E/E' av.6     Lateral E/e': 14.8  Medial E/e': 24.4           _____________________________________________________________________________  __           Report approved by: Nu Bañuelos 2021 04:04 PM        *Note: Due to a large number of results and/or encounters for the requested time period, some results have not been displayed. A complete set of results can be found in Results Review.       This note was created using voice recognition software. Undetected word substitutions or other errors may have occurred.     Lazaro Plascencia MD    Time spent with the patient, reviewing the EMR, reviewing laboratory and imaging studies, more than 50% of which was counseling and coordinating care:   60 minutes.

## 2021-01-24 NOTE — CONSULTS
"NUTRITION ASSESSMENT      REASON FOR NUTRITION CONSULT:  Provider Order  -  \"plastics recommending optimizing nutrition for wound healing (vitamin C and zinc supplementation as well)\"      ASSESSMENT:  Unable to complete nutrition assessment due to RD off-site.  Did review plastics note.  Ordered the following per plastics and as consulted (per scope of practice - wound healing protocol):  - 500 mg ascorbic acid daily x 10 days  - 220 mg zinc sulfate daily x 10 days   - Daily MVI/M  - Boost shakes BID between meals      FOLLOW UP:   Will follow up when on-site, as able.       Nolvia Pizarro RDN, LD  Clinical Dietitian  3rd floor/ICU: [phone]  All other floors: [phone]  Weekend/holiday: [phone]  "

## 2021-01-24 NOTE — PROGRESS NOTES
"   01/24/21 1200   Quick Adds   Type of Visit Initial PT Evaluation   Living Environment   People in home spouse;grandchild(morgan)   Current Living Arrangements house   Home Accessibility stairs within home;stairs to enter home   Number of Stairs, Main Entrance other (see comments)  (14)   Stair Railings, Main Entrance railing on right side (ascending)   Number of Stairs, Within Home, Primary other (see comments)  (14)   Stair Railings, Within Home, Primary railing on right side (ascending)   Transportation Anticipated family or friend will provide   Living Environment Comments reports wife recently broke her ankle and is using a walker. states he was not previously using an assistive device but with L calf would has been using the single point cane. would prefer to use the walker but spouse is using that   Self-Care   Usual Activity Tolerance moderate   Current Activity Tolerance moderate   Regular Exercise No   Equipment Currently Used at Home cane, straight;walker, standard   Disability/Function   Fall history within last six months yes   Number of times patient has fallen within last six months 2   General Information   Onset of Illness/Injury or Date of Surgery 01/22/21   Referring Physician Mathew Mcfadden MD    Patient/Family Therapy Goals Statement (PT) to return home   Pertinent History of Current Problem (include personal factors and/or comorbidities that impact the POC) per chart review, \"Todd S Aschoff is a 65 year old male with past medical history of aortic dissection status post mechanical aortic valve plus graft (1992), atrial fibrillation (chronic anticoagulation with Coumadin), chronic pain, dementia and depression who was hospitalized 1/9-1/13 here for left leg wound with drainage (initially caused by a fall approximately 1 month prior) which was found to be infected hematoma requiring I&D (1/10) and ultimately discharged home on oral antibiotics who was admitted on 1/22/2021 with generalized " "weakness, confusion and hypotension.  Patient was found to have acute CVA.  Also likely had a seizure prior to admission.  Continues to have nonhealing wound on his left lower extremity and plastics is planning debridement and wound VAC placement on 1/26\"   Existing Precautions/Restrictions fall;weight bearing   Weight-Bearing Status - LLE other (see comments)  (awaiting ortho orders, will treat as NWB for now)   Weight-Bearing Status - RLE full weight-bearing   General Observations patient receieved in bed and agreeable to PT evaluation.    Cognition   Orientation Status (Cognition) oriented x 4   Affect/Mental Status (Cognition) WFL   Cognitive Status Comments patient did have one instance of inappropriate behavior but responded well to verbal feedback and did not repeat   Pain Assessment   Patient Currently in Pain No   Range of Motion (ROM)   ROM Comment BLEs grossly WFL   Strength   Strength Comments patient able to demonstrate at least 3/5 strength bilaterally to lift BLEs off bed but not able to demonstrate functional strength with attempted transfers   Bed Mobility   Bed Mobility supine-sit   Supine-Sit Cuming (Bed Mobility) minimum assist (75% patient effort)   Assistive Device (Bed Mobility) bed rails   Comment (Bed Mobility) patient able to advance BLEs to edge of bed and trunk with head of bed elevated.    Transfers   Transfers sit-stand transfer   Sit-Stand Transfer   Sit-Stand Cuming (Transfers) dependent (less than 25% patient effort)  (not successful)   Assistive Device (Sit-Stand Transfers) walker, front-wheeled   Sit/Stand Transfer Comments patient attempting to stand while maintaining NWB to LLE but unable to complete. Minimal to no clearance off bed surface.    Sensory Examination   Sensory Perception Comments mostly intact, reports longstanding numbness to R foot. Did not test at L calf secondary to wound and dressing   Clinical Impression   Criteria for Skilled Therapeutic " Intervention yes, treatment indicated   PT Diagnosis (PT) impaired functional mobility    Influenced by the following impairments deficits in strength, balance, functional mobility tolerance, awareness of deficits.   Functional limitations due to impairments bed mobility, transfers, gait   Clinical Presentation Evolving/Changing   Clinical Presentation Rationale PT eval, patient needing surgery to address wound, weight bearing status may change   Clinical Decision Making (Complexity) moderate complexity   Planned Therapy Interventions (PT) balance training;bed mobility training;gait training;home exercise program;patient/family education;strengthening;transfer training;neuromuscular re-education   Anticipated Equipment Needs at Discharge (PT) walker, rolling   Risk & Benefits of therapy have been explained evaluation/treatment results reviewed;care plan/treatment goals reviewed;patient   PT Discharge Planning    PT Discharge Recommendation (DC Rec) Transitional Care Facility   PT Rationale for DC Rec at this time, patient is unable to stand or ambulate. Would benefit from skilled therapy in acute and TCU setting to maximize safety, independence, and mobility    PT Brief overview of current status  recommend nursing utilize a lift to transfer to chair   Total Evaluation Time   Total Evaluation Time (Minutes) 8

## 2021-01-24 NOTE — SIGNIFICANT EVENT
DATE:  1/24/2021   TIME OF RECEIPT FROM LAB:  0631  LAB TEST:  INR  LAB VALUE:  6.61  RESULTS GIVEN to Admitting Hospitalist via WBT  TIME LAB VALUE REPORTED TO PROVIDER:   0635

## 2021-01-24 NOTE — PROVIDER NOTIFICATION
REASON FOR CONTACT:INR=6.61   PROVIDER CONTACTED: admitting hospitalist   TIME CONTACTED:now   MODE OF CONTACT: wbt

## 2021-01-24 NOTE — PROVIDER NOTIFICATION
Hospitalist MD paged with following message at 1220: RN notes Plastic surgery recommends Silvadene cream BID to wound as well as ABX w/ Gram + coverage. No orders placed by Plastic Surgery. Please advise.    Provider response: ABX and cream ordered.

## 2021-01-24 NOTE — PLAN OF CARE
A&O x4. VSS. Ax2 w/ WW/GB. +BS/gas, tolerating diet, voiding. Incontinent of urine this am, linen change and washed up patient. Needs urine sample. Orders for midline to be placed. Plastic surgery rounded on patient, patient to go to OR Tuesday. Silvadene cream ordered and wound care recommendations in surgeons note, wound care completed this shift x1. Needs to be done again this evening. INR 6.61, Vit K given PO, see MAR. Patient to begining IV Rocephin for abx coverage as well as CIVF. PRN oxycodone 5mg for pain given x1. Will continue to monitor.

## 2021-01-24 NOTE — PROCEDURES
[facility]    Single Lumen Midline Placement    Date/Time: 1/24/2021 2:44 PM  Performed by: Sera Christopher RN  Authorized by: Carolina Small MD   Indications: vascular access    UNIVERSAL PROTOCOL   Site Marked: No  Prior Images Obtained and Reviewed:  No  Required items: Required blood products, implants, devices and special equipment available    Patient identity confirmed:  Verbally with patient and arm band  NA - No sedation, light sedation, or local anesthesia  Confirmation Checklist:  Patient's identity using two indicators, relevant allergies, procedure was appropriate and matched the consent or emergent situation and correct equipment/implants were available  Time out: Immediately prior to the procedure a time out was called (with axel Caraballo RN)    Universal Protocol: the Joint Commission Universal Protocol was followed    Preparation: Patient was prepped and draped in usual sterile fashion    ESBL (mL):  1         ANESTHESIA    Anesthesia: Local infiltration  Local Anesthetic:  Lidocaine 1% without epinephrine  Anesthetic Total (mL):  1      SEDATION    Patient Sedated: No        Preparation: skin prepped with ChloraPrep  Skin prep agent: skin prep agent completely dried prior to procedure  Sterile barriers: maximum sterile barriers were used: cap, mask, sterile gown, sterile gloves, and large sterile sheet  Hand hygiene: hand hygiene performed prior to central venous catheter insertion  Type of line used: Midline  Catheter type: single lumen  Lumen type: non-valved  Catheter size: 18g.  Brand: [facility]  Lot number: [id number]  Placement method: venipuncture, MST and ultrasound  Number of attempts: 1  Successful placement: yes  Orientation: right  Location: cephalic vein  Arm circumference: adults 10 cm  Extremity circumference: 37.5  Visible catheter length: 0  Internal length: 8 cm  Total catheter length: 8  Dressing and securement: blood cleaned with CHG, chlorhexidine patch  applied, dressing applied, occlusive dressing applied, statlock, sterile dressing applied and secured with tape  Post procedure assessment: free fluid flow and blood return through all ports  PROCEDURE   Patient Tolerance:  Patient tolerated the procedure well with no immediate complications  Describe Procedure: Midline is good to use

## 2021-01-24 NOTE — PLAN OF CARE
Evening RN ([phone])    Patient vital signs are at baseline: Yes - still a bit hypotensive, 99/58.  Patient able to ambulate as they were prior to admission or with assist devices provided by therapies during their stay:  No,  Reason:  Pt declined getting out of bed this shift, stating he will do so tomorrow.  Verbalized concern about pain in his LLE and that one of his legs is shorter than the other which makes ambulation more difficult for him.  Patient MUST void prior to discharge:  Yes  Patient able to tolerate oral intake:  Yes  Pain has adequate pain control using Oral analgesics:  Yes    Pt A/O x4.  Neuros seem intact.  R eye seems to be lazy in visual tracking, this is baseline per pt.  VSS and afebrile.  Tele SR.  Pain managed adequately with scheduled PO Tylenol.  CMS intact - +1 dorsalis pedis pulses bilaterally.  Dressing CDI to LLE where ortho wrapped it earlier in afternoon.  Pt declined oob this shift even with encouragement, positions himself in bed fairly well.  Voiding adequately.  Tolerating regular diet well.  Contact precautions maintained.  Will continue to monitor.

## 2021-01-24 NOTE — PLAN OF CARE
A&Ox4, intermittently confused but easily reoriented. Neuros intact @ pt's baseline. VSS w/ soft BP's. Up Ax2 GB/W but pt declined to get OOB. CMS intact, LLE dressing CDI. Weak pedal pulses. LS clear, BS active, voiding well in urinal. Tele SR, HR 60's. Will continue to monitor.

## 2021-01-24 NOTE — PLAN OF CARE
A&O, but sleepy. +BS/gas, tolerating diet. Voiding using urinal at bedside. Declined getting out of bed today despite encouragement. Dressing from I&D site from two weeks ago removed to assess. Incision sites have dehisced, draining serosanguinous fluid, site red and black. Cleansed with wound cleanser and new dressing applied paged hospitalist service stating patient needs Ortho consult. Ortho consulted. INR 6.90, MD notified, no interventions. Tele Stroke and General Neuro both consulted and saw patient today. Will continue to monitor.

## 2021-01-24 NOTE — PROGRESS NOTES
[facility]  Hospitalist Progress Note  Carolina Small MD 01/24/21  Text Page  Pager: [phone] (7am-6pm)    Reason for Stay (Diagnosis): AMS, weakness, hypotension         Assessment and Plan:      Summary of Stay: Todd S Aschoff is a 65 year old male with past medical history of aortic dissection status post mechanical aortic valve plus graft (1992), atrial fibrillation (chronic anticoagulation with Coumadin), chronic pain, dementia and depression who was hospitalized 1/9-1/13 here for left leg wound with drainage (initially caused by a fall approximately 1 month prior) which was found to be infected hematoma requiring I&D (1/10) and ultimately discharged home on oral antibiotics who was admitted on 1/22/2021 with generalized weakness, confusion and hypotension.  Patient was found to have acute CVA.  Also likely had a seizure prior to admission.  Continues to have nonhealing wound on his left lower extremity and plastics is planning debridement and wound VAC placement on 1/26.    Problem List/Assessment and Plan:     Acute right frontal infarct, suspect this is a silent infarct as he has no neurological deficits: Patient did undergo CT of the head which showed age indeterminate right occipital infarct.  MRI of the brain showed an acute infarct in the right frontal vertex and multiple chronic infarcts in the right occipital lobe and bilateral cerebellar hemispheres.  MRA shows possible diffuse stenosis of the left PCA.  TTE showed no change from prior.  He has no neurological deficits.  -Stroke neurology consulted, appreciate recommendations  -Continue simvastatin 40 mg daily  -Continue Coumadin as PTA  -Follow-up with PCP in 1 to 2 weeks, follow-up with neurology in clinic in 8 weeks    Suspected seizure: Patient's wife describes seizure-like activity prior to the events that led to his hospitalization.  He had a right arm shaking and unresponsiveness.  He does have an abnormal MRI as above which  certainly could serve as a seizure focus.  Neurology has recommended starting lamotrigine.  -Seizure precautions  -General neurology consulted, appreciate recommendations  -Start lamotrigine 25 mg daily  -Need to follow-up with general neurology in 2 weeks (at that time lamotrigine can be increased to 50 mg daily and EEG can be done)  -No driving, climbing, tub baths or potential dangerous activity in the next 3 months    Left lower extremity infected hematoma status post I&D on 1/10, nonhealing with associated cellulitis.  S/p fall in December 2020.  During his last admission was found to have infected hematoma.  Underwent I&D with Ortho on 1/10.  His wound culture grew E. coli and Enterococcus faecalis.  ID did follow throughout that admission and felt that the pathogen was E. coli.  He was treated with IV ceftriaxone and at time of discharge cefuroxime for an additional 7 days.  He has had dehiscence of the lower wound with surrounding necrosis.  Orthopedics consulted, they have also spoken with plastic surgery.  I discussed with plastic surgery today and they are planning debridement and placement of a wound VAC on 1/26.  Dr. Plascencia is recommending antibiotics for treatment of surrounding cellulitis.   -Orthopedics consulted, appreciate recommendations  -Plastic surgery consulted, appreciate recommendations  -Plan for OR on 1/26  -Reversal of INR as below  -Start ceftriaxone  -Silvadene twice daily, wound nurse consult tomorrow    Acute toxic/metabolic encephalopathy - Resolved: Presented with diffuse weakness and significant confusion.  He had significant hypotension.  With hydration his mental status did improve.  I suspect his confusion was multifactorial due to likely seizure, hypotension, acute kidney injury.  See above and below for further treatments.    Acute kidney injury.  Baseline creatinine approximately 1.  On admission creatinine was elevated at 1.58, has increased to 1.66.  He is urinating.  He  was quite hypotensive on admission which could have lead to ATN causing his renal insufficiency.  UA with 7 RBC, 2 WBC with proteinuria.  Will check FeNa.  -No NSAIDs  -Check FeNA  -Start half-normal saline at 125 cc/h  -BMP in the morning    Hypotension - Resolved.  Patient had rather profound hypotension on presentation.  This improved significantly with fluids.  Blood pressure now is stable.     Chronic anticoagulation with warfarin for mechanical aortic valve and atrial fibrillation.    INR is currently supratherapeutic at 6.61, currently no bleeding.  As above he needs to have surgery on 1/26.  He will be given a dose of vitamin K today.  May need repeat dose tomorrow pending INR level.  His goal INR level is 2.5-3.5.  Will need to start heparin drip once INR is subtherapeutic.  Once he is through the postoperative course we should be able to use Lovenox instead of a heparin drip.    -2.5 mg vitamin K today  -Repeat INR in the morning  -Hold Coumadin with plans for surgery on 1/26  -Once INR below 2.5 then will need to start heparin drip     Atrial fibrillation.  Resumed the patient on his propafenone.  Anticoagulation plan as above.     Chronic pain.  Patient is on Lyrica.  As needed oxycodone also available in addition to Tylenol.     Hypothyroidism.  Patient is on levothyroxine and liothyronine.     Depression.  Continue Effexor.    Dementia.  Continue prior to admission Namenda and Aricept.    Diet: Combination Diet Regular Diet Adult    DVT Prophylaxis: Warfarin  Zimmer Catheter: not present  Code Status: Full Code      Disposition Plan   Expected discharge: 3-5 days, recommended to transitional care unit once antibiotic plan established, renal function improved, safe disposition plan/ TCU bed available and surgical procedures completed.  Entered: Carolina Small MD 01/24/2021, 10:49 AM       The patient's care was discussed with the Bedside Nurse, Patient and Patient's Family.    Hospitalist Service  CAITLIN  "[facility]          Interval History (Subjective):      Patient was sleeping when I saw him this morning bu easily awoken.  He states he feels ok but just tired today.  Aware that he will be having surgery with Plastics on Tuesday.  I spoke with Dr. Plascencia with Plastic surgery, will need INR reversed for debridement and wound vac on Tuesday.    The patient denies CP, SOB, dizziness, nausea, emesis, abdominal pain.  Eating well.  Aware of details of current hospital stay.    I spoke with patient's wife, Lissy, by phone.  Updated her on the care plan, all of her questions were answered.                  Physical Exam:      Last Vital Signs:  /76   Pulse 63   Temp 97  F (36.1  C)   Resp 16   Ht 1.854 m (6' 1\")   Wt 140.3 kg (309 lb 3.2 oz)   SpO2 97%   BMI 40.79 kg/m      General: Sleeping but easily awoken, pleasant, NAD  HEENT: Normocephalic and atraumatic, eyes anicteric and without scleral injection, EOMI, face symmetric, MMM.  Cardiac: RRR, normal S1, S2. No m/g/r, no LE edema.  Pulmonary: Normal chest rise, normal work of breathing.  Lungs CTAB without crackles or wheezing.  Abdomen: soft, non-tender, non-distended.  Normoactive bowel sounds, no guarding or rebound tenderness.  Extremities: no deformities.  Warm, well perfused.  Skin: no rashes or lesions.  Warm and Dry. Dressing not removed over LLE as it was just replaced.  Neuro: No focal deficits.  Speech clear.  Coordination and strength grossly normal.  Psych: Alert and oriented x3. Appropriate affect.         Medications:      All current medications were reviewed with changes reflected in problem list.         Data:      All new lab and imaging data was reviewed.   Labs:  Recent Labs   Lab 01/24/21  0551 01/23/21  0748 01/22/21  1143 01/22/21  1105    139  --  137   POTASSIUM 4.2 3.9  --  4.3   CHLORIDE 112* 108  --  105   CO2 30 29  --  31   ANIONGAP <1* 2*  --  1*   GLC 89 90  --  125*   BUN 20 21  --  18   CR " 1.66* 1.62*  --  1.58*   GFRESTIMATED 43* 44* 47* 46*   GFRESTBLACK 50* 51* 57* 53*   CATERINA 8.6 8.4*  --  9.1     Recent Labs   Lab 21  0551 21  1105   WBC 6.1 6.5   HGB 11.2* 12.8*   HCT 35.2* 40.2    100    259     Recent Labs   Lab 21  0551 21  0748 21  1105   INR 6.61* 6.90* 3.65*      Imaging:   Recent Results (from the past 24 hour(s))   Echocardiogram Complete    Narrative    [id number]  [id number]  [id number]  [id number]^MICHAEL^DONNA^[facility]  Echocardiography Laboratory  [address] [place] [address]        Name: ASCHOFF, TODD S  MRN: [id number]  : 1955  Study Date: 2021 01:40 PM  Age: 65 yrs  Gender: Male  Patient Location: [facility]  Reason For Study: CVA  Ordering Physician: DONNA RODRIGUEZ  Performed By: Naina Liu     BSA: 2.6 m2  Height: 73 in  Weight: 309 lb  HR: 70  BP: 121/71 mmHg  _____________________________________________________________________________  __        Procedure  Complete Portable Bubble Echo Adult. Optison (NDC #[id number]) given  intravenously. Technically difficult study.Extremely difficult acoustic  windows despite the use of contrast for endcardial border definition.  _____________________________________________________________________________  __        Interpretation Summary     Left ventricular systolic function is normal.  The visual ejection fraction is estimated at 55-60%.  S/P mechanical aortic valve replacement with conduit graft graft  The anterior portion of aortic root appears thickened, unchanged when directly  compared to prior study from 2018.  The prosthetic aortic valve and leaflets are not well visualized.  The gradient is normal for this prosthetic aortic valve. (Mean gradient 11mm)  The study was technically difficult.  _____________________________________________________________________________  __        Left Ventricle  The left ventricle is normal in size. There  is concentric remodeling present.  Left ventricular systolic function is normal. The visual ejection fraction is  estimated at 55-60%. Grade II or moderate diastolic dysfunction. No regional  wall motion abnormalities noted.     Right Ventricle  The right ventricle is normal size. The right ventricle is not well  visualized.     Atria  The left atrium is mildly dilated. LA volume appears to be underestimated.  Right atrium not well visualized. There is no color Doppler evidence of an  atrial shunt. A contrast injection (Bubble Study) was performed that was  negative for flow across the interatrial septum.     Mitral Valve  There is mild mitral annular calcification. There is trace mitral  regurgitation.        Tricuspid Valve  There is mild (1+) tricuspid regurgitation. The right ventricular systolic  pressure is approximated at 29.2 mmHg plus the right atrial pressure. IVC  diameter >2.1 cm collapsing <50% with sniff suggests a high RA pressure  estimated at 15 mmHg or greater.     Aortic Valve  There is trace aortic regurgitation. The mean AoV pressure gradient is 11.0  mmHg. There is a mechanical aortic valve. The prosthetic aortic valve is not  well visualized. The gradient is normal for this prosthetic aortic valve.     Pulmonic Valve  There is mild (1+) pulmonic valvular regurgitation. Right ventricular  diastolic pressure is approximated at 5mmHg plus the right atrial pressure.     Vessels  The aortic root is normal size.     Pericardium  There is no pericardial effusion.        Rhythm  Sinus rhythm was noted.  _____________________________________________________________________________  __  MMode/2D Measurements & Calculations     IVSd: 1.5 cm  LVIDd: 4.2 cm  LVIDs: 2.7 cm  LVPWd: 1.3 cm  FS: 36.5 %  LV mass(C)d: 224.1 grams  LV mass(C)dI: 86.6 grams/m2  LA dimension: 5.2 cm  asc Aorta Diam: 3.0 cm  LVOT diam: 2.3 cm  LVOT area: 4.1 cm2  LA Volume (BP): 56.0 ml  LA Volume Index (BP): 21.6 ml/m2  RWT: 0.59         Time Measurements  Aortic HR: 71.0 BPM     Doppler Measurements & Calculations  MV E max enrike: 125.9 cm/sec  MV A max enrike: 69.6 cm/sec  MV E/A: 1.8  MV dec time: 0.17 sec  Ao V2 max: 226.7 cm/sec  Ao max P.0 mmHg  Ao V2 mean: 157.1 cm/sec  Ao mean P.0 mmHg  Ao V2 VTI: 41.5 cm  SHAWN(I,D): 1.7 cm2  SHAWN(V,D): 1.4 cm2  LV V1 max P.4 mmHg  LV V1 max: 78.0 cm/sec  LV V1 VTI: 17.1 cm  CO(LVOT): 5.0 l/min  CI(LVOT): 1.9 l/min/m2  SV(LVOT): 70.9 ml  SI(LVOT): 27.4 ml/m2  PA acc time: 0.15 sec  PI end-d enrike: 111.3 cm/sec  TR max enrike: 270.1 cm/sec  TR max P.2 mmHg  AV Enrike Ratio (DI): 0.34  SHAWN Index (cm2/m2): 0.66  E/E' av.6     Lateral E/e': 14.8  Medial E/e': 24.4           _____________________________________________________________________________  __           Report approved by: Nu Bañuelos 2021 04:04 PM          Carolina Small MD

## 2021-01-24 NOTE — CONSULTS
[facility]    Orthopedics Consultation    Date of Admission:  1/22/2021    Assessment & Plan   Todd S Aschoff is a 65 year old male who was admitted on 1/22/2021. The patient is approximately 2 weeks status post I&D of a left lower extremity wound due to a Coumadin induced hematoma by one of my partners.  He has now dehisced the lower wound and there is significant necrosis surrounding the wound.  His sutures are still in place but they are not keeping the wound closed.  The wound does not appear to be infected.  He has no hardware underneath and I think he will likely need wound coverage.  This could potentially be a split-thickness skin graft.  I would like to have plastic surgery come take a look at the wound to determine appropriate coverage.  More recommendations will follow once I am able to speak with them.      Currently the wound is stable and not infected.  He can have dressing changes as needed and at least every other day until we come up with a definitive plan for these wounds.    Geraldo Reed MD    Code Status    Full Code    Reason for Consult   Reason for consult: Left lower extremity wound    Primary Care Physician   Obdulio Ingram MD    History of Present Illness   Todd S Aschoff is a 65 year old male who presents with a left lower extremity wound.  He is 2 weeks status post irrigation and debridement of left lower extremity wound due to a hematoma, likely caused by Coumadin.  His sutures are still in place and he is admitted to the hospital for neurologic symptoms including confusion and weakness.  When our nursing staff took down his dressings they noticed that the wound was dehisced and significantly necrotic.  We were then consulted.  He denies any numbness or tingling distally in his extremity currently although he said his sensation is slightly different since the surgery.      MEDS:   No current outpatient medications on file.       PAST MEDICAL HISTORY:   Past  Medical History:   Diagnosis Date     Alcohol dependence (H)      Allergy, unspecified not elsewhere classified     seasonal     Antiplatelet or antithrombotic long-term use     coumadin/lovenox     Aortic valve prosthesis present      Atrial fibrillation (H)      Blood transfusion     after heart surg 1992     BPH (benign prostatic hypertrophy)      Chronic infection     low back wound incision not healing      Chronic pain     lower back and right leg and left leg     Coagulation disorder (H)     on blood thinners     Dissection of aorta, thoracic (H) 1992    St Jose F aotic valve + arch graft 1992     Headache(784.0)      Heart murmur     aortic valve replaced and arch     History of spinal cord injury      Low back pain      Major depression      Mixed hyperlipidemia      Numbness and tingling     right leg post surg rightt hip/ also left leg since surg     OA (osteoarthritis)     hips     Obesity, unspecified      Prostate infection      Sciatica 2002    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 Jose F's valve     C TOTAL HIP ARTHROPLASTY  2010    Left AMANDA     C TOTAL HIP ARTHROPLASTY  2002    R hip replacement comp's by nerve injury with pain down into R leg, nadya below knee     CATARACT IOL, RT/LT      rt eye only     CHOLECYSTECTOMY, LAPOROSCOPIC  8/10     COLONOSCOPY N/A 1/15/2015    Procedure: COLONOSCOPY;  Surgeon: Johnson Kothari MD;  Location:  GI     DECOMPRESSION LUMBAR ONE LEVEL  4/3/2013    Procedure: DECOMPRESSION LUMBAR ONE LEVEL;  Open Decompression L3-4 bilateral;  Surgeon: Travis Coffey MD;  Location:  OR     DECOMPRESSION, FUSION CERVICAL ANTERIOR ONE LEVEL, COMBINED  3/23/2012    Procedure:COMBINED DECOMPRESSION, FUSION CERVICAL ANTERIOR ONE LEVEL; Anterior Cervical Decompression and Fusion C4-6; Surgeon:TRAVIS COFFEY;  Location:[facility] OR     EXPLORE SPINE, REMOVE HARDWARE, COMBINED  2013    Procedure: COMBINED EXPLORE SPINE, REMOVE HARDWARE;  Exploration Lumbar Wound for fluid collection;  Surgeon: Khalif Coffey MD;  Location: [facility] OR     FUSION CERVICAL ANTERIOR TWO LEVELS  3/26/2012    Procedure:FUSION CERVICAL ANTERIOR TWO LEVELS; Anterior Cervical Fusion C4-6, Anterior Cervical  Hematoma Evacuation; Surgeon:KHALIF COFFEY; Location:[facility] OR     IRRIGATION AND DEBRIDEMENT LOWER EXTREMITY, COMBINED Left 1/10/2021    Procedure: 1.  Irrigation and excisional debridement to muscle left distal medial calf chronic wounds total area measuring 9 cm x 4 cm 2.  Irrigation and excisional debridement to fascia left medial calf chronic hematoma measuring 29 x 6.7 x 4.2 cm 3.  Primary complex wound closure left medial distal calf 9 cm in length;  Surgeon: Rod Kemp MD;  Location: [facility] OR     IRRIGATION AND DEBRIDEMENT SPINE, CLOSE WOUND, COMBINED  3/26/2012    Procedure:COMBINED IRRIGATION AND DEBRIDEMENT SPINE, CLOSE WOUND; Surgeon:KHALIF COFFEY; Location:[facility] OR     removal of cyst of back   2.5week     TONSILLECTOMY       wisdom teeth[       [facility] NONSPECIFIC PROCEDURE      repair of TAA with graft       FAMILY HISTORY:   Family History   Problem Relation Age of Onset     Cerebrovascular Disease Father          age 86, had M.I. ,diabetic also     Prostate Cancer Father      Cancer Mother          age 83 of dementia, also h/o lymphoma     Hypertension Brother         2 brothers with hypertension & sleep apnea     Hypertension Sister         Born ~1936     Sleep Apnea Brother          age 78, Alzheimers     No Known Problems Son      No Known Problems Daughter      No Known Problems Son      Family History Negative Brother         Had 5 brothers, three still alive as of      Colon Cancer No family hx of        SOCIAL HISTORY:   Social History     Tobacco Use     Smoking status: Current Every Day Smoker      Packs/day: 0.00     Years: 3.00     Pack years: 0.00     Types: Clove cigarettes or kreteks, Pipe, Cigars     Smokeless tobacco: Never Used   Substance Use Topics     Alcohol use: No     Comment: Stopped drinking alcohol ~2009       ALLERGIES:    Allergies   Allergen Reactions     Gabapentin      Severe behavioral disturbances       ROS:  10 point ROS neg other than the symptoms noted above in the HPI.    Physical Exam   Temp: 97.1  F (36.2  C) Temp src: Temporal BP: 103/64 Pulse: 63   Resp: 16 SpO2: 98 % O2 Device: None (Room air)    Vital Signs with Ranges  Temp:  [97.1  F (36.2  C)-98.7  F (37.1  C)] 97.1  F (36.2  C)  Pulse:  [63-67] 63  Resp:  [15-16] 16  BP: ()/(48-71) 103/64  SpO2:  [96 %-99 %] 98 %  309 lbs 3.2 oz    Constitutional: Pleasant, alert, appropriate, following commands.  HEENT: Head atraumatic normocephalic. Pupils equal round and reactive to light.  Respiratory: Unlabored breathing no audible wheeze  Cardiovascular: Regular rate and rhythm  GI: Abdomen soft nontender nondistended.  Lymph/Hematologic: No lymphadenopathy in areas examined  Genitourinary:  No alfaro  Skin: No rashes, no cyanosis, no edema.  Musculoskeletal: Focused examination of the left lower extremity demonstrates 2 wounds on the medial aspect of the lower leg.  The distal wound has almost completely dehisced with sutures in place only on the anterior portion of the wound.  There is significant necrotic tissue throughout the wound.  There is no surrounding erythema or purulent drainage.  This wound is approximately 9 cm x 3 cm.  The more proximal wound still has approximation of the edges.  This does have a thick area of eschar present with only some tissue necrosis.  Neurologic: normal without focal findings, mental status, speech normal, alert and oriented x iii, VALARIE, reflexes normal and symmetric  Neuropsychiatric:       Data   Results for orders placed or performed during the hospital encounter of 01/22/21 (from  the past 24 hour(s))   Hemoglobin A1c   Result Value Ref Range    Hemoglobin A1C 4.9 0 - 5.6 %   INR   Result Value Ref Range    INR 6.90 (HH) 0.86 - 1.14   Basic metabolic panel   Result Value Ref Range    Sodium 139 133 - 144 mmol/L    Potassium 3.9 3.4 - 5.3 mmol/L    Chloride 108 94 - 109 mmol/L    Carbon Dioxide 29 20 - 32 mmol/L    Anion Gap 2 (L) 3 - 14 mmol/L    Glucose 90 70 - 99 mg/dL    Urea Nitrogen 21 7 - 30 mg/dL    Creatinine 1.62 (H) 0.66 - 1.25 mg/dL    GFR Estimate 44 (L) >60 mL/min/[1.73_m2]    GFR Estimate If Black 51 (L) >60 mL/min/[1.73_m2]    Calcium 8.4 (L) 8.5 - 10.1 mg/dL   Lipid panel reflex to direct LDL   Result Value Ref Range    Cholesterol 135 <200 mg/dL    Triglycerides 130 <150 mg/dL    HDL Cholesterol 44 >39 mg/dL    LDL Cholesterol Calculated 65 <100 mg/dL    Non HDL Cholesterol 91 <130 mg/dL   Neurology IP Consult: General (non-stroke/non-ICU); Patient to be seen: Routine - within 24 hours; Possible seizure activity per wife report; Consultant may enter orders: Yes; Requesting provider? Attending physician    Nancy Miranda CNP     1/23/2021  3:55 PM      [facility]    Stroke Consult Note    Reason for Consult:  CVA    Chief Complaint: Generalized Weakness       HPI  Todd S Aschoff is a 65 year old male with complicated past   medical history including history of aortic dissection s/p   mechanical aortic valve, atrial fibrillation (on Coumadin),   dementia, recent fall and resulting LLE hematoma, and CHARISSA who was   brought to the [facility] ED 1/22 for evaluation of generalized   weakness and altered mental status. The patient cannot recall the   events leading up to hospitalization, so I spoke with his wife   for more information. She reports that he was initially not   making sense when talking to her about his medications. A short   time later, she reports that she heard a sound from upstairs and   he was on the floor with his right arm  shaking (her children have   had seizures and she reports this looked similar to those   events). His eyes appeared glazed over and he was not responding   to her. After this, he was confused and not responding   appropriately. On arrival BP was 80s/60s, which improved with   fluids.     CTH was obtained which demonstrated an age-indeterminate right   occipital infarct. A follow-up MRI brain showed an acute infarct   in the right frontal vertex and multiple chronic infarcts in the   right occipital lobe and bilateral cerebellar hemispheres. MRA   shows possible diffuse stenosis of the left PCA.     Stroke Evaluation summarized:  MRI/Head CT: MRI brain as above  Intracranial Vascular Imaging: MRA brain with possible diffuse   stenosis of the left PCA  Cervical Carotid and Vertebral Artery Vascular Imaging: MRA neck   without significant stenosis  Echocardiogram: Awaiting  EKG/Telemetry: Sinus bradycardia with 1st degree AVB  LDL: 65  A1c: 4.9  Troponin: not tested   Other testing: Not Applicable    Impression  # Acute right frontal infarct, suspect this was a silent infarct   as there are no reports of focal neurologic deficits    # Multiple chronic infarcts    # Episode of right arm shaking and unresponsiveness followed by   confusion, concern for possible seizure    Recommendations  - Awaiting TTE, will follow and update recommendations as   indicated.   - Blood cultures NGTD. If these become positive, would recommend   CARMEN for evaluation of endocarditis  - Continue Coumadin  - LDL 65, within goal 40-70, continue PTA Simvastatin 40 mg daily  - A1c within goal <7.0  - Reports that BP has been labile recently. Discussed that he   should acquire a BP cuff and record AM and PM readings. Goal BP   <140/90 with tighter control associated with decreased overall CV   risk, if tolerated  - Therapies, as needed  - General neurology consult for further evaluation of episode of   right arm shaking and AMS.    Patient Follow-up  "   - in the next 1-2 week(s) with PCP   - Hospital follow up for stroke: schedule with Any stroke THOR in   8 weeks at [facility] ([phone]).   Referral placed in discharge orders: note, referral states   \"neurosurgery\" but it is for stroke clinic.    Thank you for this consult. No further stroke evaluation is   recommended, so we will sign off. Please contact us with any   additional questions.    AMARIS Osorio, CNP  Neurology  To page me or covering stroke neurology team member, click here:   [facility]   Choose \"On Call\" tab at top, then search dropdown box for   \"Neurology Adult\", select location, press Enter, then look for   stroke/neuro ICU/telestroke.    _____________________________________________________    Past Medical History   Past Medical History:   Diagnosis Date     Alcohol dependence (H)      Allergy, unspecified not elsewhere classified     seasonal     Antiplatelet or antithrombotic long-term use     coumadin/lovenox     Aortic valve prosthesis present      Atrial fibrillation (H)      Blood transfusion     after heart surg 1992     BPH (benign prostatic hypertrophy)      Chronic infection     low back wound incision not healing      Chronic pain     lower back and right leg and left leg     Coagulation disorder (H)     on blood thinners     Dissection of aorta, thoracic (H) 1992    St Jose F aotic valve + arch graft 1992     Headache(784.0)      Heart murmur     aortic valve replaced and arch     History of spinal cord injury      Low back pain      Major depression      Mixed hyperlipidemia      Numbness and tingling     right leg post surg rightt hip/ also left leg since surg     OA (osteoarthritis)     hips     Obesity, unspecified      Prostate infection      Sciatica 2002    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 " Jose F's valve     C TOTAL HIP ARTHROPLASTY  2010    Left AMANDA     C TOTAL HIP ARTHROPLASTY  2002    R hip replacement comp's by nerve injury with pain down into R   leg, nadya below knee     CATARACT IOL, RT/LT      rt eye only     CHOLECYSTECTOMY, LAPOROSCOPIC  8/10     COLONOSCOPY N/A 1/15/2015    Procedure: COLONOSCOPY;  Surgeon: Johnson Kothari MD;    Location:  GI     DECOMPRESSION LUMBAR ONE LEVEL  4/3/2013    Procedure: DECOMPRESSION LUMBAR ONE LEVEL;  Open Decompression   L3-4 bilateral;  Surgeon: Travis Coffey MD;  Location: [facility]   OR     DECOMPRESSION, FUSION CERVICAL ANTERIOR ONE LEVEL, COMBINED    3/23/2012    Procedure:COMBINED DECOMPRESSION, FUSION CERVICAL ANTERIOR ONE   LEVEL; Anterior Cervical Decompression and Fusion C4-6;   Surgeon:TRAVIS COFFEY; Location:[facility] OR     EXPLORE SPINE, REMOVE HARDWARE, COMBINED  5/23/2013    Procedure: COMBINED EXPLORE SPINE, REMOVE HARDWARE;  Exploration   Lumbar Wound for fluid collection;  Surgeon: Travis Coffey MD;  Location: [facility] OR     FUSION CERVICAL ANTERIOR TWO LEVELS  3/26/2012    Procedure:FUSION CERVICAL ANTERIOR TWO LEVELS; Anterior Cervical   Fusion C4-6, Anterior Cervical  Hematoma Evacuation; Surgeon:TRAVIS COFFEY; Location: OR       IRRIGATION AND DEBRIDEMENT LOWER EXTREMITY, COMBINED Left   1/10/2021    Procedure: 1.  Irrigation and excisional debridement to muscle   left distal medial calf chronic wounds total area measuring 9 cm   x 4 cm 2.  Irrigation and excisional debridement to fascia left   medial calf chronic hematoma measuring 29 x 6.7 x 4.2 cm 3.    Primary complex wound closure left medial distal calf 9 cm in   length;  Surgeon: Rod Kemp MD;  Location: [facility] OR     IRRIGATION AND DEBRIDEMENT SPINE, CLOSE WOUND, COMBINED    3/26/2012    Procedure:COMBINED IRRIGATION AND DEBRIDEMENT SPINE, CLOSE   WOUND; Surgeon:TRAVIS COFFEY; Location:[facility] OR     removal of cyst of back   2.5week     TONSILLECTOMY        wisdom teeth[       ZZC NONSPECIFIC PROCEDURE  1992    repair of TAA with graft     Medications   Home Meds  Prior to Admission medications    Medication Sig Start Date End Date Taking? Authorizing Provider   acetaminophen (TYLENOL) 500 MG tablet Take 2 tablets (1,000 mg)   by mouth 3 times daily 1/11/21  Yes Carolina High PA-C   azelastine (ASTELIN) 0.1 % nasal spray USE 2 SPRAYS IN NOSTRIL 2   TIMES DAILY AS NEEDED. 5/29/20  Yes Obdulio Ingram MD   cetirizine (ZYRTEC) 10 MG tablet TAKE ONE TABLET BY MOUTH ONCE   DAILY AS NEEDED 5/29/20  Yes Obdulio Ingram MD   cyclobenzaprine (FLEXERIL) 10 MG tablet TAKE ONE TABLET BY MOUTH   THREE TIMES DAILY AS NEEDED FOR MUSCLE SPASM. 9/9/20  Yes   Emily Taylor APRN CNP   diazepam (VALIUM) 2 MG tablet Take 1 tablet (2 mg) by mouth every   12 hours as needed for pain (try not to use over one daily)   12/2/20  Yes Aurelio Veronica MD   donepezil (ARICEPT) 10 MG tablet Take 2 tablets (20 mg) by mouth   At Bedtime Stop if diarrhea or nausea develop 12/2/20  Yes Aurelio Veronica MD   enoxaparin ANTICOAGULANT (LOVENOX ANTICOAGULANT) 150 MG/ML   syringe Inject 0.9 mLs (135 mg) Subcutaneous every 12 hours until   INR > 2.5 1/13/21  Yes Gold Shay MD   folic acid (FOLVITE) 1 MG tablet Take 5 tablets (5 mg) by mouth   daily 12/3/20  Yes Aurelio Veronica MD   guanFACINE (TENEX) 1 MG tablet Take 1 tablet (1 mg) by mouth At   Bedtime 12/11/20  Yes Obdulio Ingram MD   levothyroxine (SYNTHROID/LEVOTHROID) 150 MCG tablet Take 1 tablet   (150 mcg) by mouth daily 12/11/20  Yes Obdulio Ingram MD   liothyronine (CYTOMEL) 25 MCG tablet Take 1 tablet (25 mcg) by   mouth daily 12/3/20  Yes Aurelio Veronica MD   melatonin 3 MG tablet Take 1 tablet (3 mg) by mouth nightly as   needed for sleep 12/2/20  Yes Aurelio Veronica MD   memantine (NAMENDA) 10 MG tablet Take 10 mg by mouth 2 times   daily   Yes Unknown, Entered By History   mirabegron (MYRBETRIQ) 50 MG 24 hr tablet  Take 1 tablet (50 mg)   by mouth daily 12/31/20  Yes Obdulio Ingram MD   pregabalin (LYRICA) 100 MG capsule Take 1 capsule (100 mg) by   mouth 3 times daily Do not take if sleepy/sedated. 12/11/20  Yes   Obdulio Ingram MD   propafenone (RYTHMOL) 150 MG TABS tablet Take 1 tablet (150 mg)   by mouth every 8 hours 12/10/20  Yes Obdulio Ingram MD   senna-docusate (SENOKOT-S;PERICOLACE) 8.6-50 MG per tablet Take 1   tablet by mouth 2 times daily as needed for constipation 8/2/18    Yes Obdulio Ingram MD   simvastatin (ZOCOR) 40 MG tablet Take 1 tablet (40 mg) by mouth   At Bedtime. Need to update cholesterol level before additional   refills. 12/7/20  Yes Obdulio Ingram MD   triamcinolone (KENALOG) 0.1 % external cream Apply topically 2   times daily as needed (rash/itching) 5/29/20  Yes Obdulio Ingram MD   venlafaxine (EFFEXOR-XR) 150 MG 24 hr capsule Take 2 capsules   (300 mg) by mouth daily 1/20/21  Yes Obdulio Ingram MD   warfarin ANTICOAGULANT (COUMADIN) 5 MG tablet Take 1.5 tablets   (7.5 mg) every Sun, Thurs; 1 tablet (5 mg) all other days or as   directed by INR Clinic. 1/14/21  Yes Obdulio Ingram MD   oxyCODONE (ROXICODONE) 5 MG tablet Take 1 tablet (5 mg) by mouth   every 4 hours as needed for breakthrough pain 1/11/21   Carolina High PA-C       Scheduled Meds    acetaminophen  1,000 mg Oral TID     donepezil  20 mg Oral At Bedtime     folic acid  5 mg Oral Daily     guanFACINE  1 mg Oral At Bedtime     levothyroxine  150 mcg Oral Daily     liothyronine  25 mcg Oral Daily     memantine  10 mg Oral BID     pregabalin  100 mg Oral TID     propafenone  150 mg Oral Q8H     simvastatin  40 mg Oral At Bedtime     sodium chloride (PF)  3 mL Intracatheter Q8H     warfarin-No DOSE today  1 each Does not apply no dose today   (warfarin)       Infusion Meds    - MEDICATION INSTRUCTIONS -       sodium chloride       Warfarin Therapy Reminder         PRN Meds  azelastine, cetirizine, lidocaine 4%,  lidocaine (buffered or not   buffered), melatonin, - MEDICATION INSTRUCTIONS -,   senna-docusate, sodium chloride (PF), Warfarin Therapy Reminder    Allergies   Allergies   Allergen Reactions     Gabapentin      Severe behavioral disturbances     Family History   Family History   Problem Relation Age of Onset     Cerebrovascular Disease Father          age 86, had M.I. ,diabetic also     Prostate Cancer Father      Cancer Mother          age 83 of dementia, also h/o lymphoma     Hypertension Brother         2 brothers with hypertension & sleep apnea     Hypertension Sister         Born ~1936     Sleep Apnea Brother          age 78, Alzheimers     No Known Problems Son      No Known Problems Daughter      No Known Problems Son      Family History Negative Brother         Had 5 brothers, three still alive as of 2019     Colon Cancer No family hx of      Social History   Social History     Tobacco Use     Smoking status: Current Every Day Smoker     Packs/day: 0.00     Years: 3.00     Pack years: 0.00     Types: Clove cigarettes or kreteks, Pipe, Cigars     Smokeless tobacco: Never Used   Substance Use Topics     Alcohol use: No     Comment: Stopped drinking alcohol ~     Drug use: No       Review of Systems   The 10 point Review of Systems is negative other than noted in   the HPI or here.        PHYSICAL EXAMINATION   Temp:  [97  F (36.1  C)-97.8  F (36.6  C)] 97.8  F (36.6  C)  Pulse:  [60-69] 67  Resp:  [16-18] 16  BP: ()/(55-82) 121/71  SpO2:  [99 %-100 %] 99 %    Neurologic  Mental Status:  alert, oriented x 3, follows commands, speech   clear and fluent, naming and repetition normal  Cranial Nerves:  visual fields intact (tested by nurse), EOMI   with normal smooth pursuit, facial sensation intact and symmetric   (tested by nurse), facial movements symmetric, hearing not   formally tested but intact to conversation, no dysarthria,   shoulder shrug equal bilaterally, tongue protrusion  midline  Motor:  no abnormal movements, able to move all limbs antigravity   spontaneously with no signs of hemiparesis observed, no pronator   drift  Reflexes:  unable to test (telestroke)  Sensory:  light touch sensation intact and symmetric throughout   upper and lower extremities (assessed by nurse), no extinction on   double simultaneous stimulation (assessed by nurse)  Coordination:  normal finger-to-nose and heel-to-shin bilaterally   without dysmetria  Station/Gait:  unable to test due to telestroke    Dysphagia Screen  Per Nursing    Stroke Scales    NIHSS  Interval     Interval Comments     1a. Level of Consciousness 0-->Alert, keenly responsive   1b. LOC Questions 0-->Answers both questions correctly   1c. LOC Commands 0-->Performs both tasks correctly   2.   Best Gaze 0-->Normal   3.   Visual 0-->No visual loss   4.   Facial Palsy 0-->Normal symmetrical movements   5a. Motor Arm, Left 0-->No drift, limb holds 90 (or 45) degrees   for full 10 secs   5b. Motor Arm, Right 0-->No drift, limb holds 90 (or 45) degrees   for full 10 secs   6a. Motor Leg, Left 0-->No drift, leg holds 30 degree position   for full 5 secs   6b. Motor Leg, right 0-->No drift, leg holds 30 degree position   for full 5 secs   7.   Limb Ataxia 0-->Absent   8.   Sensory 0-->Normal, no sensory loss   9.   Best Language 0-->No aphasia, normal   10. Dysarthria 0-->Normal   11. Extinction and Inattention  0-->No abnormality   Total 0 (01/23/21 1554)       Imaging  I personally reviewed all imaging; relevant findings per HPI.    Labs Data   CBC  Recent Labs   Lab 01/22/21  1105   WBC 6.5   RBC 4.03*   HGB 12.8*   HCT 40.2        Basic Metabolic Panel   Recent Labs   Lab 01/23/21  0748 01/22/21  1105    137   POTASSIUM 3.9 4.3   CHLORIDE 108 105   CO2 29 31   BUN 21 18   CR 1.62* 1.58*   GLC 90 125*   CATERINA 8.4* 9.1     Liver Panel  Recent Labs   Lab 01/22/21  1105   PROTTOTAL 7.2   ALBUMIN 3.2*   BILITOTAL 0.9   ALKPHOS 106   AST  25   ALT 24     INR    Recent Labs   Lab Test 01/23/21  0748 01/22/21  1105 01/13/21  0709   INR 6.90* 3.65* 2.01*      Lipid Profile    Recent Labs   Lab Test 01/23/21  0748 12/11/20  1545 10/29/19  1024 12/24/14  0846 12/24/14  0846 12/17/13  1012   CHOL 135 226* 200*   < > 166 170   HDL 44 39* 51   < > 41 30*   LDL 65 149* 114*   < > 78 74   TRIG 130 192* 174*   < > 234* 331*   CHOLHDLRATIO  --   --   --   --  4.0 5.7*    < > = values in this interval not displayed.     A1C    Recent Labs   Lab Test 01/23/21  0747   A1C 4.9     Troponin I    Recent Labs   Lab 01/22/21  1105   TROPI <0.015          Stroke Code / Stroke Consult Data Data Telestroke Service Details    (for non-emergent stroke consult with tele)  Video start time 01/23/21   1116   Video end time 01/23/21   1146   Type of service telemedicine diagnostic assessment of acute   neurological changes   Reason telemedicine is appropriate patient requires assessment   with a specialist for diagnosis and treatment of neurological   symptoms   Mode of transmission secure interactive audio and video   communication per Lynda   Originating site (patient location) [facility]    Distant site (provider location) [facility]       I have personally spent a total of 50 minutes providing care and   consulting with this patient's medical providers today, with more   than 50% of this time spent in consultation, coordination of   care, and discussion with the patient and/or family regarding   diagnostic results, prognosis, symptom management, risks and   benefits of management options, and development of plan of care.       Osmolality   Result Value Ref Range    Osmolality 292 280 - 301 mmol/kg   Echocardiogram Complete    Narrative    [id number]  [id number]  [id number]  [id number]^SOLEI^DONNA^[facility]  Echocardiography Laboratory  [address] [place] [address]        Name: ASCHOFF, TODD  S  MRN: [id number]  : 1955  Study Date: 2021 01:40 PM  Age: 65 yrs  Gender: Male  Patient Location: [facility]  Reason For Study: CVA  Ordering Physician: DONNA RODRIGUEZ  Performed By: Naina Liu     BSA: 2.6 m2  Height: 73 in  Weight: 309 lb  HR: 70  BP: 121/71 mmHg  _____________________________________________________________________________  __        Procedure  Complete Portable Bubble Echo Adult. [employer]ison (NDC #[id number]) given  intravenously. Technically difficult study.Extremely difficult acoustic  windows despite the use of contrast for endcardial border definition.  _____________________________________________________________________________  __        Interpretation Summary     Left ventricular systolic function is normal.  The visual ejection fraction is estimated at 55-60%.  S/P mechanical aortic valve replacement with conduit graft graft  The anterior portion of aortic root appears thickened, unchanged when directly  compared to prior study from 2018.  The prosthetic aortic valve and leaflets are not well visualized.  The gradient is normal for this prosthetic aortic valve. (Mean gradient 11mm)  The study was technically difficult.  _____________________________________________________________________________  __        Left Ventricle  The left ventricle is normal in size. There is concentric remodeling present.  Left ventricular systolic function is normal. The visual ejection fraction is  estimated at 55-60%. Grade II or moderate diastolic dysfunction. No regional  wall motion abnormalities noted.     Right Ventricle  The right ventricle is normal size. The right ventricle is not well  visualized.     Atria  The left atrium is mildly dilated. LA volume appears to be underestimated.  Right atrium not well visualized. There is no color Doppler evidence of an  atrial shunt. A contrast injection (Bubble Study) was performed that was  negative for flow across the interatrial septum.      Mitral Valve  There is mild mitral annular calcification. There is trace mitral  regurgitation.        Tricuspid Valve  There is mild (1+) tricuspid regurgitation. The right ventricular systolic  pressure is approximated at 29.2 mmHg plus the right atrial pressure. IVC  diameter >2.1 cm collapsing <50% with sniff suggests a high RA pressure  estimated at 15 mmHg or greater.     Aortic Valve  There is trace aortic regurgitation. The mean AoV pressure gradient is 11.0  mmHg. There is a mechanical aortic valve. The prosthetic aortic valve is not  well visualized. The gradient is normal for this prosthetic aortic valve.     Pulmonic Valve  There is mild (1+) pulmonic valvular regurgitation. Right ventricular  diastolic pressure is approximated at 5mmHg plus the right atrial pressure.     Vessels  The aortic root is normal size.     Pericardium  There is no pericardial effusion.        Rhythm  Sinus rhythm was noted.  _____________________________________________________________________________  __  MMode/2D Measurements & Calculations     IVSd: 1.5 cm  LVIDd: 4.2 cm  LVIDs: 2.7 cm  LVPWd: 1.3 cm  FS: 36.5 %  LV mass(C)d: 224.1 grams  LV mass(C)dI: 86.6 grams/m2  LA dimension: 5.2 cm  asc Aorta Diam: 3.0 cm  LVOT diam: 2.3 cm  LVOT area: 4.1 cm2  LA Volume (BP): 56.0 ml  LA Volume Index (BP): 21.6 ml/m2  RWT: 0.59        Time Measurements  Aortic HR: 71.0 BPM     Doppler Measurements & Calculations  MV E max enrike: 125.9 cm/sec  MV A max enrike: 69.6 cm/sec  MV E/A: 1.8  MV dec time: 0.17 sec  Ao V2 max: 226.7 cm/sec  Ao max P.0 mmHg  Ao V2 mean: 157.1 cm/sec  Ao mean P.0 mmHg  Ao V2 VTI: 41.5 cm  SHAWN(I,D): 1.7 cm2  SHAWN(V,D): 1.4 cm2  LV V1 max P.4 mmHg  LV V1 max: 78.0 cm/sec  LV V1 VTI: 17.1 cm  CO(LVOT): 5.0 l/min  CI(LVOT): 1.9 l/min/m2  SV(LVOT): 70.9 ml  SI(LVOT): 27.4 ml/m2  PA acc time: 0.15 sec  PI end-d enrike: 111.3 cm/sec  TR max enrike: 270.1 cm/sec  TR max P.2 mmHg  AV Enrike Ratio (DI): 0.34  SHAWN  Index (cm2/m2): 0.66  E/E' av.6     Lateral E/e': 14.8  Medial E/e': 24.4           _____________________________________________________________________________  __           Report approved by: Nu Bañuelos 2021 04:04 PM      INR   Result Value Ref Range    INR 6.61 (HH) 0.86 - 1.14   CBC with platelets differential   Result Value Ref Range    WBC 6.1 4.0 - 11.0 10e9/L    RBC Count 3.53 (L) 4.4 - 5.9 10e12/L    Hemoglobin 11.2 (L) 13.3 - 17.7 g/dL    Hematocrit 35.2 (L) 40.0 - 53.0 %     78 - 100 fl    MCH 31.7 26.5 - 33.0 pg    MCHC 31.8 31.5 - 36.5 g/dL    RDW 14.3 10.0 - 15.0 %    Platelet Count 224 150 - 450 10e9/L    Diff Method Automated Method     % Neutrophils 47.2 %    % Lymphocytes 34.5 %    % Monocytes 11.9 %    % Eosinophils 5.1 %    % Basophils 1.0 %    % Immature Granulocytes 0.3 %    Nucleated RBCs 0 0 /100    Absolute Neutrophil 2.9 1.6 - 8.3 10e9/L    Absolute Lymphocytes 2.1 0.8 - 5.3 10e9/L    Absolute Monocytes 0.7 0.0 - 1.3 10e9/L    Absolute Eosinophils 0.3 0.0 - 0.7 10e9/L    Absolute Basophils 0.1 0.0 - 0.2 10e9/L    Abs Immature Granulocytes 0.0 0 - 0.4 10e9/L    Absolute Nucleated RBC 0.0    Basic metabolic panel   Result Value Ref Range    Sodium 142 133 - 144 mmol/L    Potassium 4.2 3.4 - 5.3 mmol/L    Chloride 112 (H) 94 - 109 mmol/L    Carbon Dioxide 30 20 - 32 mmol/L    Anion Gap <1 (L) 3 - 14 mmol/L    Glucose 89 70 - 99 mg/dL    Urea Nitrogen 20 7 - 30 mg/dL    Creatinine 1.66 (H) 0.66 - 1.25 mg/dL    GFR Estimate 43 (L) >60 mL/min/[1.73_m2]    GFR Estimate If Black 50 (L) >60 mL/min/[1.73_m2]    Calcium 8.6 8.5 - 10.1 mg/dL     *Note: Due to a large number of results and/or encounters for the requested time period, some results have not been displayed. A complete set of results can be found in Results Review.

## 2021-01-25 LAB
ANION GAP SERPL CALCULATED.3IONS-SCNC: 1 MMOL/L (ref 3–14)
APTT PPP: 39 SEC (ref 22–37)
BUN SERPL-MCNC: 17 MG/DL (ref 7–30)
CALCIUM SERPL-MCNC: 8.5 MG/DL (ref 8.5–10.1)
CHLORIDE SERPL-SCNC: 111 MMOL/L (ref 94–109)
CO2 SERPL-SCNC: 30 MMOL/L (ref 20–32)
CREAT SERPL-MCNC: 1.38 MG/DL (ref 0.66–1.25)
CREAT UR-MCNC: 30 MG/DL
ERYTHROCYTE [DISTWIDTH] IN BLOOD BY AUTOMATED COUNT: 14.2 % (ref 10–15)
FRACT EXCRET NA UR+SERPL-RTO: 2.5 %
GFR SERPL CREATININE-BSD FRML MDRD: 54 ML/MIN/{1.73_M2}
GLUCOSE SERPL-MCNC: 82 MG/DL (ref 70–99)
HCT VFR BLD AUTO: 35.4 % (ref 40–53)
HGB BLD-MCNC: 11.2 G/DL (ref 13.3–17.7)
INR PPP: 2.08 (ref 0.86–1.14)
INR PPP: 2.72 (ref 0.86–1.14)
MCH RBC QN AUTO: 31.5 PG (ref 26.5–33)
MCHC RBC AUTO-ENTMCNC: 31.6 G/DL (ref 31.5–36.5)
MCV RBC AUTO: 100 FL (ref 78–100)
PLATELET # BLD AUTO: 219 10E9/L (ref 150–450)
POTASSIUM SERPL-SCNC: 4.4 MMOL/L (ref 3.4–5.3)
RBC # BLD AUTO: 3.55 10E12/L (ref 4.4–5.9)
SODIUM SERPL-SCNC: 142 MMOL/L (ref 133–144)
SODIUM UR-SCNC: 77 MMOL/L
UFH PPP CHRO-ACNC: 0.19 IU/ML
WBC # BLD AUTO: 6.7 10E9/L (ref 4–11)

## 2021-01-25 PROCEDURE — 85520 HEPARIN ASSAY: CPT | Performed by: INTERNAL MEDICINE

## 2021-01-25 PROCEDURE — 36415 COLL VENOUS BLD VENIPUNCTURE: CPT | Performed by: HOSPITALIST

## 2021-01-25 PROCEDURE — 250N000011 HC RX IP 250 OP 636: Performed by: INTERNAL MEDICINE

## 2021-01-25 PROCEDURE — 85610 PROTHROMBIN TIME: CPT | Performed by: INTERNAL MEDICINE

## 2021-01-25 PROCEDURE — 258N000002 HC RX IP 258 OP 250: Performed by: INTERNAL MEDICINE

## 2021-01-25 PROCEDURE — 36415 COLL VENOUS BLD VENIPUNCTURE: CPT | Performed by: INTERNAL MEDICINE

## 2021-01-25 PROCEDURE — 3E0U33Z INTRODUCTION OF ANTI-INFLAMMATORY INTO JOINTS, PERCUTANEOUS APPROACH: ICD-10-PCS | Performed by: PHYSICIAN ASSISTANT

## 2021-01-25 PROCEDURE — 82570 ASSAY OF URINE CREATININE: CPT | Performed by: INTERNAL MEDICINE

## 2021-01-25 PROCEDURE — 250N000009 HC RX 250: Performed by: PHYSICIAN ASSISTANT

## 2021-01-25 PROCEDURE — 97602 WOUND(S) CARE NON-SELECTIVE: CPT

## 2021-01-25 PROCEDURE — 85610 PROTHROMBIN TIME: CPT | Performed by: HOSPITALIST

## 2021-01-25 PROCEDURE — 250N000011 HC RX IP 250 OP 636: Performed by: PHYSICIAN ASSISTANT

## 2021-01-25 PROCEDURE — 85730 THROMBOPLASTIN TIME PARTIAL: CPT | Performed by: INTERNAL MEDICINE

## 2021-01-25 PROCEDURE — 250N000013 HC RX MED GY IP 250 OP 250 PS 637: Performed by: INTERNAL MEDICINE

## 2021-01-25 PROCEDURE — 250N000009 HC RX 250: Performed by: INTERNAL MEDICINE

## 2021-01-25 PROCEDURE — 120N000001 HC R&B MED SURG/OB

## 2021-01-25 PROCEDURE — 99233 SBSQ HOSP IP/OBS HIGH 50: CPT | Performed by: INTERNAL MEDICINE

## 2021-01-25 PROCEDURE — 80048 BASIC METABOLIC PNL TOTAL CA: CPT | Performed by: HOSPITALIST

## 2021-01-25 PROCEDURE — 84300 ASSAY OF URINE SODIUM: CPT | Performed by: INTERNAL MEDICINE

## 2021-01-25 PROCEDURE — 3E0U3NZ INTRODUCTION OF ANALGESICS, HYPNOTICS, SEDATIVES INTO JOINTS, PERCUTANEOUS APPROACH: ICD-10-PCS | Performed by: PHYSICIAN ASSISTANT

## 2021-01-25 PROCEDURE — 85027 COMPLETE CBC AUTOMATED: CPT | Performed by: HOSPITALIST

## 2021-01-25 PROCEDURE — 250N000013 HC RX MED GY IP 250 OP 250 PS 637: Performed by: HOSPITALIST

## 2021-01-25 RX ORDER — METHYLPREDNISOLONE ACETATE 40 MG/ML
40 INJECTION, SUSPENSION INTRA-ARTICULAR; INTRALESIONAL; INTRAMUSCULAR; SOFT TISSUE ONCE
Status: COMPLETED | OUTPATIENT
Start: 2021-01-25 | End: 2021-01-25

## 2021-01-25 RX ORDER — HEPARIN SODIUM 10000 [USP'U]/100ML
0-5000 INJECTION, SOLUTION INTRAVENOUS CONTINUOUS
Status: DISCONTINUED | OUTPATIENT
Start: 2021-01-25 | End: 2021-01-26

## 2021-01-25 RX ORDER — LIDOCAINE HYDROCHLORIDE 10 MG/ML
10 INJECTION, SOLUTION EPIDURAL; INFILTRATION; INTRACAUDAL; PERINEURAL ONCE
Status: COMPLETED | OUTPATIENT
Start: 2021-01-25 | End: 2021-01-25

## 2021-01-25 RX ADMIN — PROPAFENONE HYDROCHLORIDE 150 MG: 150 TABLET, FILM COATED ORAL at 13:33

## 2021-01-25 RX ADMIN — MEMANTINE 10 MG: 5 TABLET ORAL at 07:57

## 2021-01-25 RX ADMIN — PROPAFENONE HYDROCHLORIDE 150 MG: 150 TABLET, FILM COATED ORAL at 21:35

## 2021-01-25 RX ADMIN — LEVOTHYROXINE SODIUM 150 MCG: 150 TABLET ORAL at 06:29

## 2021-01-25 RX ADMIN — SODIUM CHLORIDE: 4.5 INJECTION, SOLUTION INTRAVENOUS at 13:37

## 2021-01-25 RX ADMIN — PREGABALIN 100 MG: 100 CAPSULE ORAL at 13:33

## 2021-01-25 RX ADMIN — PHYTONADIONE 1 MG: 10 INJECTION, EMULSION INTRAMUSCULAR; INTRAVENOUS; SUBCUTANEOUS at 08:07

## 2021-01-25 RX ADMIN — GUANFACINE 1 MG: 1 TABLET ORAL at 21:35

## 2021-01-25 RX ADMIN — SODIUM CHLORIDE: 4.5 INJECTION, SOLUTION INTRAVENOUS at 21:41

## 2021-01-25 RX ADMIN — LIDOCAINE HYDROCHLORIDE 10 ML: 10 INJECTION, SOLUTION EPIDURAL; INFILTRATION; INTRACAUDAL; PERINEURAL at 13:32

## 2021-01-25 RX ADMIN — MULTIPLE VITAMINS W/ MINERALS TAB 1 TABLET: TAB at 08:07

## 2021-01-25 RX ADMIN — SILVER SULFADIAZINE: 10 CREAM TOPICAL at 21:16

## 2021-01-25 RX ADMIN — OXYCODONE HYDROCHLORIDE 5 MG: 5 TABLET ORAL at 12:02

## 2021-01-25 RX ADMIN — ZINC SULFATE 220 MG (50 MG) CAPSULE 220 MG: CAPSULE at 07:56

## 2021-01-25 RX ADMIN — ACETAMINOPHEN 1000 MG: 500 TABLET, FILM COATED ORAL at 07:56

## 2021-01-25 RX ADMIN — LIOTHYRONINE SODIUM 25 MCG: 25 TABLET ORAL at 06:29

## 2021-01-25 RX ADMIN — METHYLPREDNISOLONE ACETATE 40 MG: 40 INJECTION, SUSPENSION INTRA-ARTICULAR; INTRALESIONAL; INTRAMUSCULAR; SOFT TISSUE at 13:32

## 2021-01-25 RX ADMIN — SIMVASTATIN 40 MG: 40 TABLET, FILM COATED ORAL at 21:35

## 2021-01-25 RX ADMIN — VENLAFAXINE HYDROCHLORIDE 300 MG: 150 CAPSULE, EXTENDED RELEASE ORAL at 07:56

## 2021-01-25 RX ADMIN — SILVER SULFADIAZINE: 10 CREAM TOPICAL at 09:30

## 2021-01-25 RX ADMIN — FOLIC ACID 5 MG: 1 TABLET ORAL at 07:56

## 2021-01-25 RX ADMIN — LAMOTRIGINE 25 MG: 25 TABLET ORAL at 07:57

## 2021-01-25 RX ADMIN — SODIUM CHLORIDE: 4.5 INJECTION, SOLUTION INTRAVENOUS at 06:32

## 2021-01-25 RX ADMIN — PREGABALIN 100 MG: 100 CAPSULE ORAL at 07:57

## 2021-01-25 RX ADMIN — Medication 500 MG: at 07:56

## 2021-01-25 RX ADMIN — MEMANTINE 10 MG: 5 TABLET ORAL at 21:15

## 2021-01-25 RX ADMIN — CEFTRIAXONE 1 G: 1 INJECTION, POWDER, FOR SOLUTION INTRAMUSCULAR; INTRAVENOUS at 13:33

## 2021-01-25 RX ADMIN — ACETAMINOPHEN 1000 MG: 500 TABLET, FILM COATED ORAL at 13:33

## 2021-01-25 RX ADMIN — ACETAMINOPHEN 1000 MG: 500 TABLET, FILM COATED ORAL at 21:15

## 2021-01-25 RX ADMIN — DONEPEZIL HYDROCHLORIDE 20 MG: 10 TABLET ORAL at 21:35

## 2021-01-25 RX ADMIN — OXYCODONE HYDROCHLORIDE 5 MG: 5 TABLET ORAL at 05:09

## 2021-01-25 RX ADMIN — HEPARIN SODIUM 1200 UNITS/HR: 10000 INJECTION, SOLUTION INTRAVENOUS at 18:01

## 2021-01-25 RX ADMIN — PROPAFENONE HYDROCHLORIDE 150 MG: 150 TABLET, FILM COATED ORAL at 06:30

## 2021-01-25 RX ADMIN — PREGABALIN 100 MG: 100 CAPSULE ORAL at 21:15

## 2021-01-25 ASSESSMENT — ACTIVITIES OF DAILY LIVING (ADL)
ADLS_ACUITY_SCORE: 18
ADLS_ACUITY_SCORE: 18
ADLS_ACUITY_SCORE: 19
ADLS_ACUITY_SCORE: 19
ADLS_ACUITY_SCORE: 18
ADLS_ACUITY_SCORE: 21

## 2021-01-25 ASSESSMENT — MIFFLIN-ST. JEOR: SCORE: 2323.06

## 2021-01-25 NOTE — PROGRESS NOTES
Orthopedics:    Assessment: Left shoulder RC tendinitis with AC joint arthrosis and probable loose bodies.     Patient fell on 12/05/2020 onto his left side.  He had shoulder pain at that time and Xrays were obtained.  Xrays were negative for fracture.  (see below).  He has continued to have left shoulder pain when attempting to lift his left arm.  Reports mild weakness with lifting.      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 At least mild glenohumeral osteoarthritis. I suspect two articular bodies (larger 1.7 cm).    On physical exam of the left shoulder:  Skin: intact, no ecchymosis or erythema  Swelling: no joint effusion  Alignment: neutral  Tenderness to palpation Mild tenderness at AC joint.   ROM: AROM of left shoulder: abduction to 90 degrees, flexion to 120  Increased pain with resisted left shoulder abduction.    strength equal bilaterally   Distal pulses are intact and equal bilaterally  Sensation to light touch intact and equal bilaterally.     Plan:   Recommend proceeding with a left shoulder subacromial injection  Able to gently range shoulder as tolerated  If injection not beneficial, follow-up with a shoulder specialist at [facility] in out-patient setting for further work-up.    Marisa Mazariegos PA-C on 1/25/2021 at 12:51 PM

## 2021-01-25 NOTE — CONSULTS
[facility] Nurse Inpatient Wound Assessment   Reason for consultation: Evaluate and treat  LLE wounds    Assessment  LLE wounds due to Trauma  Status: initial assessment  Multiple areas of necrotic tissue, swelling and discoloration.  Agree patient needs debridement.    Treatment Plan  LLE wounds: BID-continue surgeons recommendations:  1. Cleanse with wound cleanser and dry   2. Apply Silvadene to wounds  3. Apply adaptic and ABD  4. Wrap with kerlix and ACE wrap  Orders Written  Recommended provider order: None, at this time  [facility] Nurse follow-up plan:twice weekly  Nursing to notify the Provider(s) and re-consult the [facility] Nurse if wound(s) deteriorates or new skin concern.    Patient History  According to provider note(s):  Todd S Aschoff is a 65 year old male who has a past medical history of Alcohol dependence, Allergy, Aortic valve prosthesis, Atrial fibrillation, BPH, Chronic pain,  Dissection of aorta, thoracic  (1992), Headache, Heart murmur, History of spinal cord injury, Low back pain, Major depression, Mixed hyperlipidemia, Numbness and tingling, OA (osteoarthritis), Obesity, Sciatica, and hypothyroidism among others.  He was recently discharged after incision and drainage of infected hematoma in the left lower extremity, patient has been recovering at home for that.  He presents with confusion, generalized weakness and very low blood pressure.  Evidence of dehydration on physical exam, acute kidney injury and quick response to volume expansion and fluid resuscitation in the emergency department.    Objective Data  Containment of urine/stool: Continent of bladder and Continent of bowel    Active Diet Order  Orders Placed This Encounter      Combination Diet Regular Diet Adult      Output:   I/O last 3 completed shifts:  In: 2070 [P.O.:360; I.V.:1710]  Out: 3350 [Urine:3350]    Risk Assessment:   Sensory Perception: 4-->no impairment  Moisture: 3-->occasionally moist  Activity: 3-->walks occasionally  Mobility:  3-->slightly limited  Nutrition: 3-->adequate  Friction and Shear: 3-->no apparent problem  Ascencion Score: 19                          Labs:   Recent Labs   Lab 01/25/21  0621 01/23/21  0747 01/23/21  0747 01/22/21  1105   ALBUMIN  --   --   --  3.2*   HGB 11.2*   < >  --  12.8*   INR 2.72*   < >  --  3.65*   WBC 6.7   < >  --  6.5   A1C  --   --  4.9  --     < > = values in this interval not displayed.       Physical Exam  Areas of skin assessed: focused LLE    Wound Location:  LLE  Date of last photo 1/25/21  Wound History: Patient injured leg after falling in December.  Required I&D on 1/10/21 for an infected hematoma.  Patient has new debridement scheduled for 1/26/21.      Wound Base: Superior wound is 4x2cm, 100% eschar with induration, erythema and purple discoloration surrounding.  Middle wound is 2x2cm, 100% agranular with areas of purple discoloration and scabbing surrounding.  Distal wound is 6x3x0.2cm, 100% eschar with erythema and purple discoloration surrounding.     Palpation of the wound bed: firm      Drainage: moderate     Description of drainage: bloody     Measurements (length x width x depth, in cm) see above     Tunneling N/A     Undermining N/A  Periwound skin: see above      Color: pink and purple      Temperature: normal   Odor: none  Pain: moderate, sharp    Interventions  Visual inspection and assessment completed   Wound Care Rationale Promote moist wound healing without tissue dehydration , Provide selective debridement (autolysis) of nonviable tissue  and Decrease bacterial load  Wound Care: done per plan of care  Supplies: gathered and placed at the bedside  Current off-loading measures: Pillows  Current support surface: Standard  Atmos Air mattress  Education provided to: plan of care  Discussed plan of care with Patient and Nurse    Torin Hand RN CWOCN

## 2021-01-25 NOTE — PLAN OF CARE
Alert, orientated. Forgetful. Makes conversation. No seizures noted. No CVA symptoms. Pain to left shoulder. Ortho consult. Did ask ortho to reassess pain to left shoulder. WOC RN completed dressing change to left leg. Mild edema left foot/leg. Left leg elevated. Numbness to bilateral feet. INR elevated this morning. Vitamin K provided. INR recheck at 1600. Plan is surgery tomorrow with plastic surgeon 1530. Afib at baseline remote tele. According to remote tele SR now. Up in chair with ceiling lift. IV fluids. Voiding well. Oxycodone x1 for shoulder pain. Pleasant mood. Patient in agreement of plan of care. 1400 Marisa VINSON did an injection at the bedside to the left shoulder. Pain to left shoulder has decreased tremendously per patient.

## 2021-01-25 NOTE — PLAN OF CARE
Patient up A2.  A&O.  Hard of hearing.  VSS.  Oxycodone and ice for LLE pain and L shoulder pain.  BS active.  Denies nausea and SOB.  Using urinal in bed.  Vitamin K given yesterday for INR of 6.61, recheck this morning.  Possible surgery on Tuesday on LLE.  Continue rocephin for cellulitis of LLE.  Dressing on LLE CDI.

## 2021-01-25 NOTE — PROCEDURES
Orthopedics    Assessment: Left shoulder RC tendinitis with AC joint arthrosis and probable loose bodies.     Procedure:    Consent obtained from patient.  Following a sterile skin prep with betadine, the subacromial space of the left shoulder was injected with 5 ml of 1% lidocaine and 40mg depomedrol.  Patient tolerated the procedure well without complication and had immediate relief.     Marisa Mazariegos PA-C on 1/25/2021 at 1:39 PM

## 2021-01-25 NOTE — CONSULTS
CLINICAL NUTRITION SERVICES  -  ASSESSMENT NOTE      MALNUTRITION:  % Weight Loss:  None noted  % Intake:  No decreased intake noted  Subcutaneous Fat Loss:  Upper arm region mild to moderate depletion --> not using as indicator with only 1 region present   Muscle Loss:  None observed (?masked)  Fluid Retention:  None noted     Malnutrition Diagnosis: Patient does not meet two of the above criteria necessary for diagnosing malnutrition        REASON FOR ASSESSMENT  Todd S Aschoff is a 65 year old male seen by Registered Dietitian for Provider Order (refer to RD brief note from yesterday).     PMH of: Recent prolonged hospitalization with LLE infected hematoma requiring I&D, chronic pain, alcohol dependence.    Admit 2/2: Confusion (fall vs seizure hitting head?), weakness, LLE infected hematoma.      NUTRITION HISTORY  - Information obtained from patient and chart.  - Diet at home: Regular.    - Usual intakes: Meals BID-TID.  May skip breakfast depending on timing of the day, per report.  - Barriers to PO intakes: None PTA.  Denies skipping of meals or changes in portions.  - Use of oral supplements: None.  - Chewing/swallowing issues: Denied.  - Allergies: NKFA.      CURRENT NUTRITION ORDERS  Diet Order:     Regular  Boost shakes between meals BID (added by RD yesterday)  NPO at midnight for procedure as outlined below    Current Intake/Tolerance:  100% intakes thus far.  Patient reports good appetite/intake.  He tried his protein shake and likes the flavor, would be willing to drink daily.  We reviewed protein sources (see education below) and he likes most meat, yogurt, milk, eggs, peanut butter.  He does not like beans or gelatein.      NUTRITION FOCUSED PHYSICAL ASSESSMENT FOR DIAGNOSING MALNUTRITION)  Yes            Observed:    Subcutaneous fat loss (refer to documentation in Malnutrition section)    Obtained from Chart/Interdisciplinary Team:  - Neuro following, likely seizure PTA  - Ortho following and  "recommended plastics involvement given necrosis surrounding wound  - Plastics recommending debridement and wound VAC on Tuesday  - WOCN following   - Stooling patterns reviewed    ANTHROPOMETRICS  Height: 6' 1\"  Weight: 309 lbs 3.2 oz  Body mass index is 40.79 kg/m .  Weight Status:  Obesity Grade III BMI >40  Weight History:  Wt Readings from Last 10 Encounters:   01/22/21 140.3 kg (309 lb 3.2 oz)   01/09/21 140.6 kg (309 lb 14.4 oz)   12/11/20 141.9 kg (312 lb 12.8 oz)   12/08/20 142 kg (313 lb)   12/01/20 142.2 kg (313 lb 6.4 oz)   03/06/20 144.7 kg (319 lb)   02/17/20 141.5 kg (312 lb)   01/22/20 141.5 kg (312 lb)   12/31/19 137 kg (302 lb)   10/29/19 141.5 kg (312 lb)     - Wt fairly stable as above.  Wt loss PTA denied by patient.    LABS  Labs reviewed:  Electrolytes  Potassium (mmol/L)   Date Value   01/25/2021 4.4   01/24/2021 4.4   01/24/2021 4.2     Phosphorus (mg/dL)   Date Value   08/14/2010 2.6    Blood Glucose  Glucose (mg/dL)   Date Value   01/25/2021 82   01/24/2021 102 (H)   01/24/2021 89   01/23/2021 90   01/22/2021 125 (H)     Hemoglobin A1C (%)   Date Value   01/23/2021 4.9   01/13/2012 5.6   07/29/2011 5.6   06/09/2010 5.0@   10/23/2009 5.4    Inflammatory Markers  CRP Inflammation (mg/L)   Date Value   11/27/2020 2.9   04/18/2013 13.4 (H)   07/02/2010 44.6 (H)     WBC (10e9/L)   Date Value   01/25/2021 6.7   01/24/2021 6.1   01/22/2021 6.5     Albumin (g/dL)   Date Value   01/22/2021 3.2 (L)   01/09/2021 3.4   11/27/2020 3.6      Magnesium (mg/dL)   Date Value   01/22/2021 2.0   08/14/2010 2.1     Sodium (mmol/L)   Date Value   01/25/2021 142   01/24/2021 138   01/24/2021 142    Renal  Urea Nitrogen (mg/dL)   Date Value   01/25/2021 17   01/24/2021 21   01/24/2021 20     Creatinine (mg/dL)   Date Value   01/25/2021 1.38 (H)   01/24/2021 1.39 (H)   01/24/2021 1.66 (H)     Additional  Triglycerides (mg/dL)   Date Value   01/23/2021 130   12/11/2020 192 (H)   10/29/2019 174 (H)     Ketones Urine " (mg/dL)   Date Value   01/22/2021 Negative        B/P: 134/79, T: 98.2, P: 71, R: 16      MEDICATIONS  Medications reviewed:    acetaminophen  1,000 mg Oral TID     cefTRIAXone  1 g Intravenous Q24H     donepezil  20 mg Oral At Bedtime     folic acid  5 mg Oral Daily     guanFACINE  1 mg Oral At Bedtime     lamoTRIgine  25 mg Oral Daily     levothyroxine  150 mcg Oral Daily     liothyronine  25 mcg Oral Daily     memantine  10 mg Oral BID     multivitamin w/minerals  1 tablet Oral Daily     pregabalin  100 mg Oral TID     propafenone  150 mg Oral Q8H     silver sulfADIAZINE   Topical BID     simvastatin  40 mg Oral At Bedtime     sodium chloride (PF)  10 mL Intracatheter Q7 Days     sodium chloride (PF)  3 mL Intracatheter Q8H     venlafaxine  300 mg Oral Daily     vitamin C  500 mg Oral Daily     zinc sulfate  220 mg Oral Daily        - MEDICATION INSTRUCTIONS -       NaCl 125 mL/hr at 01/25/21 0801     Warfarin Therapy Reminder            ASSESSED NUTRITION NEEDS PER APPROVED PRACTICE GUIDELINES:    Dosing Weight 140 kg   Estimated Energy Needs: >/=20 Kcal/Kg  Justification: minimum maintenance, wound healing  Estimated Protein Needs: >/=1 g pro/Kg  Justification: preservation of lean body mass, anticipated post-op/wound healing  Estimated Fluid Needs: per MD      NUTRITION DIAGNOSIS:  Increased nutrient needs (protein) related to wound healing as evidenced by protein needs as outlined, plans for wound VAC and debridement.    NUTRITION INTERVENTIONS  Recommendations / Nutrition Prescription  Continue regular diet.  Self-selection of protein focus.      Continue Boost shakes between meals BID.    Continue daily MVI/M   Continue 500 mg ascorbic acid daily x 10 days (wound healing, request of plastics)  Continue 220 mg zinc sulfate daily x 10 days (wound healing, request of plastics)      Implementation  Nutrition education: Provided education on high protein.  Reviewed food sources and encouraged at least 2 protein  sources with each meal, ongoing supplement use.    Medical Food Supplement: As above. }    Nutrition Goals  Patient to consume at least 75% of meals TID + 1 supplement daily.  2 protein sources with each meal TID.      MONITORING AND EVALUATION:  Progress towards goals will be monitored and evaluated per protocol and Practice Guidelines          Nolvia Pizarro RDN, LD  Clinical Dietitian  3rd floor/ICU: [phone]  All other floors: [phone]  Weekend/holiday: [phone]

## 2021-01-25 NOTE — PLAN OF CARE
"/74   Pulse 74   Temp 99  F (37.2  C) (Temporal)   Resp 16   Ht 1.854 m (6' 1\")   Wt 140.3 kg (309 lb 3.2 oz)   SpO2 96%   BMI 40.79 kg/m    Orientation: A&Ox4  Resp: LS Clear, RA  INR: Currently 6.61, was given Vit K today may need another dose depending on tomorrows level.  Midline: Patent and infusing 1/2NS @125mL/hr  Skin: wound care complete as per Plastic Surgery. Silvadene/Adaptic/Kerlix/Ace  Pain: Managed with Oxycodone  CMS: Intact, denies N/T  Activity: refused to get out of bed, A2 lift  Diet: tolerated regular  Voiding: using urinal in bed  Plan: Surgical debridement and wound vac on Tuesday.     "

## 2021-01-25 NOTE — PLAN OF CARE
OT: Pt currently limited with mobility with PT and potential for debridement on 1/26. Will hold OT eval to give pt opportunity to progress mobility with PT and further medical management. Will continue to follow.

## 2021-01-25 NOTE — PROGRESS NOTES
"Hospitalist paged \"INR 2.08, in case you wanted to d the heparin. Thanks.\". will keep monitoring.   "

## 2021-01-26 ENCOUNTER — ANESTHESIA (OUTPATIENT)
Dept: SURGERY | Facility: CLINIC | Age: 66
End: 2021-01-26
Payer: COMMERCIAL

## 2021-01-26 LAB
ANION GAP SERPL CALCULATED.3IONS-SCNC: <1 MMOL/L (ref 3–14)
BLD PROD TYP BPU: NORMAL
BLD PROD TYP BPU: NORMAL
BLD UNIT ID BPU: 0
BLD UNIT ID BPU: 0
BLOOD PRODUCT CODE: NORMAL
BLOOD PRODUCT CODE: NORMAL
BPU ID: NORMAL
BPU ID: NORMAL
BUN SERPL-MCNC: 15 MG/DL (ref 7–30)
CALCIUM SERPL-MCNC: 8.6 MG/DL (ref 8.5–10.1)
CHLORIDE SERPL-SCNC: 111 MMOL/L (ref 94–109)
CO2 SERPL-SCNC: 30 MMOL/L (ref 20–32)
CREAT SERPL-MCNC: 1.22 MG/DL (ref 0.66–1.25)
GFR SERPL CREATININE-BSD FRML MDRD: 62 ML/MIN/{1.73_M2}
GLUCOSE SERPL-MCNC: 88 MG/DL (ref 70–99)
INR PPP: 1.62 (ref 0.86–1.14)
POTASSIUM SERPL-SCNC: 4.6 MMOL/L (ref 3.4–5.3)
SODIUM SERPL-SCNC: 141 MMOL/L (ref 133–144)
TRANSFUSION STATUS PATIENT QL: NORMAL
UFH PPP CHRO-ACNC: 0.58 IU/ML
UFH PPP CHRO-ACNC: <0.1 IU/ML

## 2021-01-26 PROCEDURE — 258N000003 HC RX IP 258 OP 636: Performed by: ANESTHESIOLOGY

## 2021-01-26 PROCEDURE — 250N000013 HC RX MED GY IP 250 OP 250 PS 637: Performed by: INTERNAL MEDICINE

## 2021-01-26 PROCEDURE — 80048 BASIC METABOLIC PNL TOTAL CA: CPT | Performed by: HOSPITALIST

## 2021-01-26 PROCEDURE — 258N000002 HC RX IP 258 OP 250: Performed by: STUDENT IN AN ORGANIZED HEALTH CARE EDUCATION/TRAINING PROGRAM

## 2021-01-26 PROCEDURE — 0KBT0ZZ EXCISION OF LEFT LOWER LEG MUSCLE, OPEN APPROACH: ICD-10-PCS | Performed by: STUDENT IN AN ORGANIZED HEALTH CARE EDUCATION/TRAINING PROGRAM

## 2021-01-26 PROCEDURE — 258N000002 HC RX IP 258 OP 250: Performed by: INTERNAL MEDICINE

## 2021-01-26 PROCEDURE — 250N000013 HC RX MED GY IP 250 OP 250 PS 637: Performed by: STUDENT IN AN ORGANIZED HEALTH CARE EDUCATION/TRAINING PROGRAM

## 2021-01-26 PROCEDURE — 272N000001 HC OR GENERAL SUPPLY STERILE: Performed by: STUDENT IN AN ORGANIZED HEALTH CARE EDUCATION/TRAINING PROGRAM

## 2021-01-26 PROCEDURE — 250N000009 HC RX 250: Performed by: STUDENT IN AN ORGANIZED HEALTH CARE EDUCATION/TRAINING PROGRAM

## 2021-01-26 PROCEDURE — 250N000013 HC RX MED GY IP 250 OP 250 PS 637: Performed by: HOSPITALIST

## 2021-01-26 PROCEDURE — 36415 COLL VENOUS BLD VENIPUNCTURE: CPT | Performed by: HOSPITALIST

## 2021-01-26 PROCEDURE — 85610 PROTHROMBIN TIME: CPT | Performed by: HOSPITALIST

## 2021-01-26 PROCEDURE — 85520 HEPARIN ASSAY: CPT | Performed by: HOSPITALIST

## 2021-01-26 PROCEDURE — 710N000010 HC RECOVERY PHASE 1, LEVEL 2, PER MIN: Performed by: STUDENT IN AN ORGANIZED HEALTH CARE EDUCATION/TRAINING PROGRAM

## 2021-01-26 PROCEDURE — 85520 HEPARIN ASSAY: CPT | Performed by: INTERNAL MEDICINE

## 2021-01-26 PROCEDURE — 120N000001 HC R&B MED SURG/OB

## 2021-01-26 PROCEDURE — 250N000011 HC RX IP 250 OP 636: Performed by: NURSE ANESTHETIST, CERTIFIED REGISTERED

## 2021-01-26 PROCEDURE — 99233 SBSQ HOSP IP/OBS HIGH 50: CPT | Performed by: INTERNAL MEDICINE

## 2021-01-26 PROCEDURE — 999N000141 HC STATISTIC PRE-PROCEDURE NURSING ASSESSMENT: Performed by: STUDENT IN AN ORGANIZED HEALTH CARE EDUCATION/TRAINING PROGRAM

## 2021-01-26 PROCEDURE — 250N000009 HC RX 250: Performed by: NURSE ANESTHETIST, CERTIFIED REGISTERED

## 2021-01-26 PROCEDURE — 360N000075 HC SURGERY LEVEL 2, PER MIN: Performed by: STUDENT IN AN ORGANIZED HEALTH CARE EDUCATION/TRAINING PROGRAM

## 2021-01-26 PROCEDURE — 0KQT3ZZ REPAIR LEFT LOWER LEG MUSCLE, PERCUTANEOUS APPROACH: ICD-10-PCS | Performed by: STUDENT IN AN ORGANIZED HEALTH CARE EDUCATION/TRAINING PROGRAM

## 2021-01-26 PROCEDURE — 370N000017 HC ANESTHESIA TECHNICAL FEE, PER MIN: Performed by: STUDENT IN AN ORGANIZED HEALTH CARE EDUCATION/TRAINING PROGRAM

## 2021-01-26 PROCEDURE — 250N000011 HC RX IP 250 OP 636: Performed by: INTERNAL MEDICINE

## 2021-01-26 PROCEDURE — 250N000011 HC RX IP 250 OP 636: Performed by: ANESTHESIOLOGY

## 2021-01-26 PROCEDURE — 36415 COLL VENOUS BLD VENIPUNCTURE: CPT | Performed by: INTERNAL MEDICINE

## 2021-01-26 RX ORDER — FENTANYL CITRATE 50 UG/ML
25-50 INJECTION, SOLUTION INTRAMUSCULAR; INTRAVENOUS
Status: DISCONTINUED | OUTPATIENT
Start: 2021-01-26 | End: 2021-01-26 | Stop reason: HOSPADM

## 2021-01-26 RX ORDER — LABETALOL 20 MG/4 ML (5 MG/ML) INTRAVENOUS SYRINGE
10
Status: DISCONTINUED | OUTPATIENT
Start: 2021-01-26 | End: 2021-01-26 | Stop reason: HOSPADM

## 2021-01-26 RX ORDER — GLYCOPYRROLATE 0.2 MG/ML
INJECTION, SOLUTION INTRAMUSCULAR; INTRAVENOUS PRN
Status: DISCONTINUED | OUTPATIENT
Start: 2021-01-26 | End: 2021-01-26

## 2021-01-26 RX ORDER — MEPERIDINE HYDROCHLORIDE 25 MG/ML
12.5 INJECTION INTRAMUSCULAR; INTRAVENOUS; SUBCUTANEOUS
Status: DISCONTINUED | OUTPATIENT
Start: 2021-01-26 | End: 2021-01-26 | Stop reason: HOSPADM

## 2021-01-26 RX ORDER — NALOXONE HYDROCHLORIDE 0.4 MG/ML
0.4 INJECTION, SOLUTION INTRAMUSCULAR; INTRAVENOUS; SUBCUTANEOUS
Status: DISCONTINUED | OUTPATIENT
Start: 2021-01-26 | End: 2021-01-26 | Stop reason: HOSPADM

## 2021-01-26 RX ORDER — EPHEDRINE SULFATE 50 MG/ML
INJECTION, SOLUTION INTRAVENOUS PRN
Status: DISCONTINUED | OUTPATIENT
Start: 2021-01-26 | End: 2021-01-26

## 2021-01-26 RX ORDER — FENTANYL CITRATE 50 UG/ML
INJECTION, SOLUTION INTRAMUSCULAR; INTRAVENOUS PRN
Status: DISCONTINUED | OUTPATIENT
Start: 2021-01-26 | End: 2021-01-26

## 2021-01-26 RX ORDER — LIDOCAINE HYDROCHLORIDE 10 MG/ML
INJECTION, SOLUTION INFILTRATION; PERINEURAL PRN
Status: DISCONTINUED | OUTPATIENT
Start: 2021-01-26 | End: 2021-01-26

## 2021-01-26 RX ORDER — ONDANSETRON 2 MG/ML
4 INJECTION INTRAMUSCULAR; INTRAVENOUS EVERY 30 MIN PRN
Status: DISCONTINUED | OUTPATIENT
Start: 2021-01-26 | End: 2021-01-26 | Stop reason: HOSPADM

## 2021-01-26 RX ORDER — MAGNESIUM HYDROXIDE 1200 MG/15ML
LIQUID ORAL PRN
Status: DISCONTINUED | OUTPATIENT
Start: 2021-01-26 | End: 2021-01-26 | Stop reason: HOSPADM

## 2021-01-26 RX ORDER — HYDROMORPHONE HYDROCHLORIDE 1 MG/ML
.3-.5 INJECTION, SOLUTION INTRAMUSCULAR; INTRAVENOUS; SUBCUTANEOUS EVERY 10 MIN PRN
Status: DISCONTINUED | OUTPATIENT
Start: 2021-01-26 | End: 2021-01-26 | Stop reason: HOSPADM

## 2021-01-26 RX ORDER — NALOXONE HYDROCHLORIDE 0.4 MG/ML
0.2 INJECTION, SOLUTION INTRAMUSCULAR; INTRAVENOUS; SUBCUTANEOUS
Status: DISCONTINUED | OUTPATIENT
Start: 2021-01-26 | End: 2021-01-26 | Stop reason: HOSPADM

## 2021-01-26 RX ORDER — ONDANSETRON 4 MG/1
4 TABLET, ORALLY DISINTEGRATING ORAL EVERY 30 MIN PRN
Status: DISCONTINUED | OUTPATIENT
Start: 2021-01-26 | End: 2021-01-26 | Stop reason: HOSPADM

## 2021-01-26 RX ORDER — PHENYLEPHRINE HYDROCHLORIDE 10 MG/ML
INJECTION INTRAVENOUS PRN
Status: DISCONTINUED | OUTPATIENT
Start: 2021-01-26 | End: 2021-01-26

## 2021-01-26 RX ORDER — SODIUM CHLORIDE, SODIUM LACTATE, POTASSIUM CHLORIDE, CALCIUM CHLORIDE 600; 310; 30; 20 MG/100ML; MG/100ML; MG/100ML; MG/100ML
INJECTION, SOLUTION INTRAVENOUS CONTINUOUS
Status: DISCONTINUED | OUTPATIENT
Start: 2021-01-26 | End: 2021-01-26 | Stop reason: HOSPADM

## 2021-01-26 RX ORDER — TRANEXAMIC ACID 10 MG/ML
1 INJECTION, SOLUTION INTRAVENOUS ONCE
Status: COMPLETED | OUTPATIENT
Start: 2021-01-26 | End: 2021-01-26

## 2021-01-26 RX ORDER — ONDANSETRON 2 MG/ML
INJECTION INTRAMUSCULAR; INTRAVENOUS PRN
Status: DISCONTINUED | OUTPATIENT
Start: 2021-01-26 | End: 2021-01-26

## 2021-01-26 RX ORDER — PROPOFOL 10 MG/ML
INJECTION, EMULSION INTRAVENOUS PRN
Status: DISCONTINUED | OUTPATIENT
Start: 2021-01-26 | End: 2021-01-26

## 2021-01-26 RX ORDER — HYDRALAZINE HYDROCHLORIDE 20 MG/ML
2.5-5 INJECTION INTRAMUSCULAR; INTRAVENOUS EVERY 10 MIN PRN
Status: DISCONTINUED | OUTPATIENT
Start: 2021-01-26 | End: 2021-01-26 | Stop reason: HOSPADM

## 2021-01-26 RX ORDER — DEXAMETHASONE SODIUM PHOSPHATE 4 MG/ML
INJECTION, SOLUTION INTRA-ARTICULAR; INTRALESIONAL; INTRAMUSCULAR; INTRAVENOUS; SOFT TISSUE PRN
Status: DISCONTINUED | OUTPATIENT
Start: 2021-01-26 | End: 2021-01-26

## 2021-01-26 RX ORDER — SODIUM CHLORIDE, SODIUM LACTATE, POTASSIUM CHLORIDE, CALCIUM CHLORIDE 600; 310; 30; 20 MG/100ML; MG/100ML; MG/100ML; MG/100ML
INJECTION, SOLUTION INTRAVENOUS CONTINUOUS
Status: DISCONTINUED | OUTPATIENT
Start: 2021-01-26 | End: 2021-01-27

## 2021-01-26 RX ORDER — HEPARIN SODIUM 10000 [USP'U]/100ML
0-5000 INJECTION, SOLUTION INTRAVENOUS CONTINUOUS
Status: DISPENSED | OUTPATIENT
Start: 2021-01-27 | End: 2021-02-03

## 2021-01-26 RX ORDER — LIDOCAINE HYDROCHLORIDE AND EPINEPHRINE 10; 10 MG/ML; UG/ML
INJECTION, SOLUTION INFILTRATION; PERINEURAL PRN
Status: DISCONTINUED | OUTPATIENT
Start: 2021-01-26 | End: 2021-01-26 | Stop reason: HOSPADM

## 2021-01-26 RX ORDER — ALBUTEROL SULFATE 0.83 MG/ML
2.5 SOLUTION RESPIRATORY (INHALATION) EVERY 4 HOURS PRN
Status: DISCONTINUED | OUTPATIENT
Start: 2021-01-26 | End: 2021-01-26 | Stop reason: HOSPADM

## 2021-01-26 RX ORDER — LIDOCAINE 40 MG/G
CREAM TOPICAL
Status: DISCONTINUED | OUTPATIENT
Start: 2021-01-26 | End: 2021-01-27

## 2021-01-26 RX ADMIN — CEFTRIAXONE 1 G: 1 INJECTION, POWDER, FOR SOLUTION INTRAMUSCULAR; INTRAVENOUS at 13:22

## 2021-01-26 RX ADMIN — PHENYLEPHRINE HYDROCHLORIDE 100 MCG: 10 INJECTION INTRAVENOUS at 15:40

## 2021-01-26 RX ADMIN — HEPARIN SODIUM 1350 UNITS/HR: 10000 INJECTION, SOLUTION INTRAVENOUS at 00:34

## 2021-01-26 RX ADMIN — GUANFACINE 1 MG: 1 TABLET ORAL at 22:34

## 2021-01-26 RX ADMIN — SIMVASTATIN 40 MG: 40 TABLET, FILM COATED ORAL at 22:34

## 2021-01-26 RX ADMIN — PROPAFENONE HYDROCHLORIDE 150 MG: 150 TABLET, FILM COATED ORAL at 13:22

## 2021-01-26 RX ADMIN — LIOTHYRONINE SODIUM 25 MCG: 25 TABLET ORAL at 06:38

## 2021-01-26 RX ADMIN — DONEPEZIL HYDROCHLORIDE 20 MG: 10 TABLET ORAL at 22:34

## 2021-01-26 RX ADMIN — EPHEDRINE SULFATE 5 MG: 50 INJECTION, SOLUTION INTRAVENOUS at 15:23

## 2021-01-26 RX ADMIN — FENTANYL CITRATE 100 MCG: 50 INJECTION, SOLUTION INTRAMUSCULAR; INTRAVENOUS at 15:05

## 2021-01-26 RX ADMIN — SODIUM CHLORIDE: 4.5 INJECTION, SOLUTION INTRAVENOUS at 06:01

## 2021-01-26 RX ADMIN — OXYCODONE HYDROCHLORIDE 5 MG: 5 TABLET ORAL at 18:27

## 2021-01-26 RX ADMIN — LIDOCAINE HYDROCHLORIDE 30 MG: 10 INJECTION, SOLUTION INFILTRATION; PERINEURAL at 15:05

## 2021-01-26 RX ADMIN — PREGABALIN 100 MG: 100 CAPSULE ORAL at 07:57

## 2021-01-26 RX ADMIN — LEVOTHYROXINE SODIUM 150 MCG: 150 TABLET ORAL at 06:38

## 2021-01-26 RX ADMIN — OXYCODONE HYDROCHLORIDE 5 MG: 5 TABLET ORAL at 06:38

## 2021-01-26 RX ADMIN — PHENYLEPHRINE HYDROCHLORIDE 100 MCG: 10 INJECTION INTRAVENOUS at 15:51

## 2021-01-26 RX ADMIN — SODIUM CHLORIDE: 4.5 INJECTION, SOLUTION INTRAVENOUS at 18:27

## 2021-01-26 RX ADMIN — PROPAFENONE HYDROCHLORIDE 150 MG: 150 TABLET, FILM COATED ORAL at 06:01

## 2021-01-26 RX ADMIN — TRANEXAMIC ACID 1 G: 10 INJECTION, SOLUTION INTRAVENOUS at 15:36

## 2021-01-26 RX ADMIN — MEMANTINE 10 MG: 5 TABLET ORAL at 07:57

## 2021-01-26 RX ADMIN — HYDROMORPHONE HYDROCHLORIDE 0.5 MG: 1 INJECTION, SOLUTION INTRAMUSCULAR; INTRAVENOUS; SUBCUTANEOUS at 17:15

## 2021-01-26 RX ADMIN — MIDAZOLAM 2 MG: 1 INJECTION INTRAMUSCULAR; INTRAVENOUS at 14:57

## 2021-01-26 RX ADMIN — PHENYLEPHRINE HYDROCHLORIDE 200 MCG: 10 INJECTION INTRAVENOUS at 15:28

## 2021-01-26 RX ADMIN — MEMANTINE 10 MG: 5 TABLET ORAL at 20:32

## 2021-01-26 RX ADMIN — OXYCODONE HYDROCHLORIDE 5 MG: 5 TABLET ORAL at 02:10

## 2021-01-26 RX ADMIN — DEXAMETHASONE SODIUM PHOSPHATE 4 MG: 4 INJECTION, SOLUTION INTRA-ARTICULAR; INTRALESIONAL; INTRAMUSCULAR; INTRAVENOUS; SOFT TISSUE at 15:05

## 2021-01-26 RX ADMIN — GLYCOPYRROLATE 0.2 MG: 0.2 INJECTION, SOLUTION INTRAMUSCULAR; INTRAVENOUS at 15:05

## 2021-01-26 RX ADMIN — SILVER SULFADIAZINE: 10 CREAM TOPICAL at 10:53

## 2021-01-26 RX ADMIN — PREGABALIN 100 MG: 100 CAPSULE ORAL at 20:32

## 2021-01-26 RX ADMIN — ONDANSETRON HYDROCHLORIDE 4 MG: 2 INJECTION, SOLUTION INTRAVENOUS at 15:40

## 2021-01-26 RX ADMIN — ACETAMINOPHEN 1000 MG: 500 TABLET, FILM COATED ORAL at 20:32

## 2021-01-26 RX ADMIN — VENLAFAXINE HYDROCHLORIDE 300 MG: 150 CAPSULE, EXTENDED RELEASE ORAL at 07:57

## 2021-01-26 RX ADMIN — SODIUM CHLORIDE, POTASSIUM CHLORIDE, SODIUM LACTATE AND CALCIUM CHLORIDE: 600; 310; 30; 20 INJECTION, SOLUTION INTRAVENOUS at 14:57

## 2021-01-26 RX ADMIN — PROPOFOL 200 MG: 10 INJECTION, EMULSION INTRAVENOUS at 15:05

## 2021-01-26 RX ADMIN — PROPAFENONE HYDROCHLORIDE 150 MG: 150 TABLET, FILM COATED ORAL at 22:33

## 2021-01-26 RX ADMIN — LAMOTRIGINE 25 MG: 25 TABLET ORAL at 07:57

## 2021-01-26 ASSESSMENT — ACTIVITIES OF DAILY LIVING (ADL)
ADLS_ACUITY_SCORE: 19
ADLS_ACUITY_SCORE: 26
ADLS_ACUITY_SCORE: 25
ADLS_ACUITY_SCORE: 19
ADLS_ACUITY_SCORE: 21

## 2021-01-26 ASSESSMENT — LIFESTYLE VARIABLES: TOBACCO_USE: 1

## 2021-01-26 ASSESSMENT — ENCOUNTER SYMPTOMS: DYSRHYTHMIAS: 1

## 2021-01-26 NOTE — ANESTHESIA PREPROCEDURE EVALUATION
Anesthesia Pre-Procedure Evaluation    Patient: Todd S Aschoff   MRN: [id number] : 1955        Preoperative Diagnosis: Wound dehiscence [T81.30XA]   Procedure : Procedure(s):  left leg wound debridement washout, placement of wound vac  APPLICATION, WOUND VAC     Past Medical History:   Diagnosis Date     Alcohol dependence (H)      Allergy, unspecified not elsewhere classified     seasonal     Antiplatelet or antithrombotic long-term use     coumadin/lovenox     Aortic valve prosthesis present      Atrial fibrillation (H)      Blood transfusion     after heart surg      BPH (benign prostatic hypertrophy)      Chronic infection     low back wound incision not healing      Chronic pain     lower back and right leg and left leg     Coagulation disorder (H)     on blood thinners     Dissection of aorta, thoracic (H)     St Jose F aotic valve + arch graft      Headache(784.0)      Heart murmur     aortic valve replaced and arch     History of spinal cord injury      Low back pain      Major depression      Mixed hyperlipidemia      Numbness and tingling     right leg post surg rightt hip/ also left leg since surg     OA (osteoarthritis)     hips     Obesity, unspecified      Prostate infection      Sciatica     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 Jose F's valve     C TOTAL HIP ARTHROPLASTY  2010    Left AMANDA     C TOTAL HIP ARTHROPLASTY      R hip replacement comp's by nerve injury with pain down into R leg, nadya below knee     CATARACT IOL, RT/LT      rt eye only     CHOLECYSTECTOMY, LAPOROSCOPIC  8/10     COLONOSCOPY N/A 1/15/2015    Procedure: COLONOSCOPY;  Surgeon: Johnson Kothari MD;  Location:  GI     DECOMPRESSION LUMBAR ONE LEVEL  4/3/2013    Procedure: DECOMPRESSION LUMBAR ONE LEVEL;  Open Decompression L3-4 bilateral;  Surgeon: Khalif Casas  MD CAITLIN;  Location: [facility] OR     DECOMPRESSION, FUSION CERVICAL ANTERIOR ONE LEVEL, COMBINED  3/23/2012    Procedure:COMBINED DECOMPRESSION, FUSION CERVICAL ANTERIOR ONE LEVEL; Anterior Cervical Decompression and Fusion C4-6; Surgeon:KHALIF COFFEY; Location:[facility] OR     EXPLORE SPINE, REMOVE HARDWARE, COMBINED  5/23/2013    Procedure: COMBINED EXPLORE SPINE, REMOVE HARDWARE;  Exploration Lumbar Wound for fluid collection;  Surgeon: Khalif Coffey MD;  Location: [facility] OR     FUSION CERVICAL ANTERIOR TWO LEVELS  3/26/2012    Procedure:FUSION CERVICAL ANTERIOR TWO LEVELS; Anterior Cervical Fusion C4-6, Anterior Cervical  Hematoma Evacuation; Surgeon:KHALIF COFFEY; Location:[facility] OR     IRRIGATION AND DEBRIDEMENT LOWER EXTREMITY, COMBINED Left 1/10/2021    Procedure: 1.  Irrigation and excisional debridement to muscle left distal medial calf chronic wounds total area measuring 9 cm x 4 cm 2.  Irrigation and excisional debridement to fascia left medial calf chronic hematoma measuring 29 x 6.7 x 4.2 cm 3.  Primary complex wound closure left medial distal calf 9 cm in length;  Surgeon: Rod Kemp MD;  Location: [facility] OR     IRRIGATION AND DEBRIDEMENT SPINE, CLOSE WOUND, COMBINED  3/26/2012    Procedure:COMBINED IRRIGATION AND DEBRIDEMENT SPINE, CLOSE WOUND; Surgeon:KHALIF COFFEY; Location:[facility] OR     removal of cyst of back   2.5week     TONSILLECTOMY       wisdom teeth[       ZZC NONSPECIFIC PROCEDURE  1992    repair of TAA with graft      Allergies   Allergen Reactions     Gabapentin      Severe behavioral disturbances      Social History     Tobacco Use     Smoking status: Current Every Day Smoker     Packs/day: 0.00     Years: 3.00     Pack years: 0.00     Types: Clove cigarettes or kreteks, Pipe, Cigars     Smokeless tobacco: Never Used   Substance Use Topics     Alcohol use: No     Comment: Stopped drinking alcohol ~2009      Wt Readings from Last 1 Encounters:   01/25/21 147.9 kg (326 lb 1.6 oz)         Anesthesia Evaluation   Pt has had prior anesthetic. Type: General.    No history of anesthetic complications       ROS/MED HX  ENT/Pulmonary:     (+) ROSALIE risk factors, obese, tobacco use, Current use,     Neurologic:     (+) peripheral neuropathy, - sciatica.     Cardiovascular:     (+) Dyslipidemia -----Taking blood thinners dysrhythmias, a-fib, valvular problems/murmurs type: AS s/p AVR. Previous cardiac testing   Echo: Date: 1/22/21 Results:  Left ventricular systolic function is normal.  The visual ejection fraction is estimated at 55-60%.  S/P mechanical aortic valve replacement with conduit graft graft  The anterior portion of aortic root appears thickened, unchanged when directly  compared to prior study from 2018.  The prosthetic aortic valve and leaflets are not well visualized.  The gradient is normal for this prosthetic aortic valve. (Mean gradient 11mm)  The study was technically difficult.  Stress Test: Date: Results:    ECG Reviewed: Date: Results:    Cath: Date: Results:      METS/Exercise Tolerance:     Hematologic:     (+) history of blood transfusion, no previous transfusion reaction,     Musculoskeletal:   (+) arthritis,     GI/Hepatic:  - neg GI/hepatic ROS     Renal/Genitourinary:     (+) renal disease, type: CRI, Pt does not require dialysis, BPH,     Endo:     (+) thyroid problem, hypothyroidism, Obesity ( 41),     Psychiatric/Substance Use:     (+) psychiatric history depression alcohol abuse     Infectious Disease: Comment: Left lower leg infection and chronic low back infection    (+) MRSA,     Malignancy:       Other:      (+) , H/O Chronic Pain ( back),        Physical Exam    Airway        Mallampati: II   TM distance: > 3 FB   Neck ROM: full   Mouth opening: > 3 cm    Respiratory Devices and Support         Dental  no notable dental history         Cardiovascular          Rhythm and rate: irregular and normal   (+) murmur       Pulmonary   pulmonary exam normal                 OUTSIDE LABS:  CBC:   Lab Results   Component Value Date    WBC 6.7 01/25/2021    WBC 6.1 01/24/2021    HGB 11.2 (L) 01/25/2021    HGB 11.2 (L) 01/24/2021    HCT 35.4 (L) 01/25/2021    HCT 35.2 (L) 01/24/2021     01/25/2021     01/24/2021     BMP:   Lab Results   Component Value Date     01/26/2021     01/25/2021    POTASSIUM 4.6 01/26/2021    POTASSIUM 4.4 01/25/2021    CHLORIDE 111 (H) 01/26/2021    CHLORIDE 111 (H) 01/25/2021    CO2 30 01/26/2021    CO2 30 01/25/2021    BUN 15 01/26/2021    BUN 17 01/25/2021    CR 1.22 01/26/2021    CR 1.38 (H) 01/25/2021    GLC 88 01/26/2021    GLC 82 01/25/2021     COAGS:   Lab Results   Component Value Date    PTT 39 (H) 01/25/2021    INR 1.62 (H) 01/26/2021     POC:   Lab Results   Component Value Date     (H) 04/04/2013     HEPATIC:   Lab Results   Component Value Date    ALBUMIN 3.2 (L) 01/22/2021    PROTTOTAL 7.2 01/22/2021    ALT 24 01/22/2021    AST 25 01/22/2021    ALKPHOS 106 01/22/2021    BILITOTAL 0.9 01/22/2021     OTHER:   Lab Results   Component Value Date    LACT 1.4 01/22/2021    A1C 4.9 01/23/2021    CATERINA 8.6 01/26/2021    PHOS 2.6 08/14/2010    MAG 2.0 01/22/2021    LIPASE 22 08/10/2010    TSH 2.17 11/30/2020    T4 0.87 10/29/2019    CRP 2.9 11/27/2020    SED 30 (H) 04/18/2013       Anesthesia Plan     History & Physical Review      ASA Status:  3. NPO Status:  NPO Appropriate. .  Plan for General with Intravenous induction. Device: LMA Maintenance will be Inhalation.         PONV prophylaxis:  Ondansetron (or other 5HT-3) and Dexamethasone or Solumedrol.       Consents  Anesthesia Plan(s) and associated risks, benefits, and realistic alternatives discussed.    Questions answered and patient/representative(s) expressed understanding.    Discussed with:  Patient.       Extended Intubation/Ventilatory Support Discussed No No     Use of blood products discussed: No.          Postoperative Care  Postoperative pain  management: IV analgesics.           Naresh Andrew MD

## 2021-01-26 NOTE — OP NOTE
Plastic Surgery Operative Note      Pre-operative diagnosis: Left Leg Necrotic Wound   Post-operative diagnosis: Left Leg Hematoma 1,400 mL, Necrosis of two left lower extremity wounds. The first, [id number] and the second: 50mm x 30mm   Procedure: Evacuation of Hematoma  Debridement of Skin, Subcutaneous Tissue and Muscle 2,400 squared centimeters  Placement of Negative Pressure Wound Therapy 2,400 squared centimeters   Surgeon: Temo   Assistant(s): NONE  The Physician Assistant was medically necessary for their expertise in prepping, camera management, suctioning, suturing and retraction.   Anesthesia: general    Estimated blood loss:  Complications: 1400 cc  none   Specimens: * No specimens in log *           INDICATION FOR PROCEDURE: This is a 65 year old male with a left lower extremity wound status post hematoma washout and closure.  Plastic surgery was consulted for management and reconstruction.  We discussed operative details of the procedure including risks and benefits, and the patient agreed to proceed.     DESCRIPTION OF PROCEDURE:  Prior to surgery, I met with the patient to perform the markings in the presence of a chaperone.  Patient was taken to the operating room.  The patient was placed on the table in supine position.  General endotracheal anesthesia was induced and the left lower extremity was prepped and draped in standard sterile fashion.  Stockinette it was placed on the left foot.      It was clear that there was likely to be hematoma below the wounds given the asymmetry between the 2 lower extremities.  Nylon sutures in the devitalized tissue that were placed from the prior surgeon were removed one by one using a Metzenbaum scissor and forceps.  Both wounds were debrided of any devitalized tissue which included some muscle fat and skin.  At this point with gentle pressure copious hematoma was expressed.  The total hematoma after using Yankauer suction and manual decompression equaled 1400  mL.  There did not appear to be any fresh or active bleeding.  The lower extremity wounds, 1 proximal 1 distal did tunnel and communicate.  2 L of irrigation were used to clean out the entire hematoma cavity which extended from from 4 cm proximal to the first wound down to the second wound.  A drain was placed to decompress the dead space and secured with 2-0 nylon.  Was muscle that was repaired around 7 cm in length to keep the drain below the proximal wound.    Wound edges were debrided further.  Bovie electrocautery was used to control hemostasis.  A black sponge wound VAC was applied and the drain was placed on self suction.  There was no leak on the VAC and there was no shelbi blood in the drain or VAC.  The patient tolerated the procedure well.  Sponge and instrument counts were correct.    Drains - 1 19 [place] Gurpreet Drain    FINDINGS: as expected    Lazaro Plascencia MD

## 2021-01-26 NOTE — ANESTHESIA CARE TRANSFER NOTE
[facility]  Laney Moss M.D.  [address] [facility] [address]  [place] [address]  Fax: [phone]   Authorization for Release/Disclosure of   Protected Health Information   Name: SHELBY COELHO : 1932 SSN: [id number]   Address: [address] [place] [id number] Phone:    [phone] (home)    I authorize the entity listed below to release/disclose the PHI below to:   [facility]/Laney Moss M.D. and Laney Moss M.D.   Provider or Entity Name:  Dr. Crocker  [facility]    Address   City, [facility], [facility]   Phone:      Fax:     Reason for request: continuity of care   Information to be released:    [  ] LAST COLONOSCOPY,  including any PATH REPORT and follow-up  [  ] LAST FIT/COLOGUARD RESULT [  ] LAST DEXA  [  ] LAST MAMMOGRAM  [  ] LAST PAP  [  ] LAST LABS [  ] RETINA EXAM REPORT  [  ] IMMUNIZATION RECORDS  [  ] Release all info      [  ] Check here and initial the line next to each item to release ALL health information INCLUDING  _____ Care and treatment for drug and / or alcohol abuse  _____ HIV testing, infection status, or AIDS  _____ Genetic Testing    DATES OF SERVICE OR TIME PERIOD TO BE DISCLOSED: _____________  I understand and acknowledge that:  * This Authorization may be revoked at any time by you in writing, except if your health information has already been used or disclosed.  * Your health information that will be used or disclosed as a result of you signing this authorization could be re-disclosed by the recipient. If this occurs, your re-disclosed health information may no longer be protected by State or Federal laws.  * You may refuse to sign this Authorization. Your refusal will not affect your ability to obtain treatment.  * This Authorization becomes effective upon signing and will  on (date) __________.      If no date is indicated, this Authorization will  one (1) year from the signature date.    Name: Shelby Lechuga  Patient: Nicko SHINE Aschoff    Procedure(s):  left leg wound debridement washout, placement of wound vac  APPLICATION, WOUND VAC    Diagnosis: Wound dehiscence [T81.30XA]  Diagnosis Additional Information: No value filed.    Anesthesia Type:   General     Note:    Oropharynx: oropharynx clear of all foreign objects  Level of Consciousness: awake  Oxygen Supplementation: face mask    Independent Airway: airway patency satisfactory and stable  Dentition: dentition unchanged      Patient transferred to: PACU  Comments: Report and signed off to RN in PACU.  Good Resps, skin pink, VSS, O2 via face tent.  Handoff Report: Identifed the Patient, Identified the Reponsible Provider, Reviewed the pertinent medical history, Discussed the surgical course, Reviewed Intra-OP anesthesia mangement and issues during anesthesia, Set expectations for post-procedure period and Allowed opportunity for questions and acknowledgement of understanding      Vitals: (Last set prior to Anesthesia Care Transfer)  CRNA VITALS  1/26/2021 1556 - 1/26/2021 1634      1/26/2021             NIBP:  140/87    NIBP Mean:  108        Electronically Signed By: AMARIS Camilo CRNA  January 26, 2021  4:34 PM   Sis    Signature:   Date:     7/10/2020       PLEASE FAX REQUESTED RECORDS BACK TO: [phone]

## 2021-01-26 NOTE — PLAN OF CARE
A&Ox4, VSS, up ax2 w/ lift. Hep gtt increased to 1350 units/hr. Next Xa at 0600. Left leg wound dressing reinforced, elevated. Numbness to all extremities. Voiding well. NPO since midnight.    Plan for surgery at 1535 today.

## 2021-01-26 NOTE — PLAN OF CARE
Alert, orientated. Making conversation. Mild forgetful.   Dressing changed to left leg per plan of care. Did have moderate amount of drainage at the bottom of that wound- thick sanguinous fluids. Tolerated dressing change well. Un approximated sutures.   Numbness to feet. Bedrest today.   Surgery planned at 1530. Had heparin infusing this morning. Did stop at 0930 after talking with Dr. Plascencia. Remote tele. SR.   Coumadin on hold preop. NPO.   Continent and incontinent of large amount of urine.   Jean wipes. Rocephin.   Patient ready for surgery.   No shoulder pain today. Range of motion has improved to left shoulder. Pleasant mood.

## 2021-01-26 NOTE — PROGRESS NOTES
Orthopedics    Left shoulder pain    Patient reports his shoulder pain has greatly improved since his subacromial injection    He is able to flex and abduct the left shoulder to 170 degrees without pain.    Plan:  Able to WBAT left shoulder  Follow-up with shoulder specialist in outpatient setting if continued pain.

## 2021-01-26 NOTE — ANESTHESIA POSTPROCEDURE EVALUATION
Patient: Todd S Aschoff    Procedure(s):  left leg wound debridement washout, placement of wound vac  APPLICATION, WOUND VAC    Diagnosis:Wound dehiscence [T81.30XA]  Diagnosis Additional Information: No value filed.    Anesthesia Type:  General    Note:  Disposition: Inpatient   Postop Pain Control: Uneventful            Sign Out: Well controlled pain   PONV: No   Neuro/Psych: Uneventful            Sign Out: Acceptable/Baseline neuro status   Airway/Respiratory: Uneventful            Sign Out: Acceptable/Baseline resp. status   CV/Hemodynamics: Uneventful            Sign Out: Acceptable CV status   Other NRE: NONE   DID A NON-ROUTINE EVENT OCCUR? No         Last vitals:  Vitals:    01/26/21 1635 01/26/21 1640 01/26/21 1645   BP: (!) 143/87 136/89 (!) 142/81   Pulse: 77 80 78   Resp: (!) 5 13 14   Temp:      SpO2: 100% 99% 96%       Electronically Signed By: Maurice Atkins MD  January 26, 2021  5:09 PM

## 2021-01-26 NOTE — BRIEF OP NOTE
[facility]    Brief Operative Note    Pre-operative diagnosis: Wound dehiscence [T81.30XA]  Post-operative diagnosis Hematoma Left Lower Extremity, Left Lower Extremity Necrotic Skin    Procedure: Procedure(s):  left leg wound debridement washout, placement of wound vac  APPLICATION, WOUND VAC  Surgeon: Surgeon(s) and Role:     * Lazaro Plascencia MD - Primary  Anesthesia: General   Estimated blood loss: 1400mL in the form of Hematoma  Drains: Rehan-Manley x1  Wound VAC    Specimens: * No specimens in log *  Findings:   Hematoma 1400 mL. No active bleeding. .  Complications: None.  Implants: * No implants in log *

## 2021-01-26 NOTE — PLAN OF CARE
Pt on heparine drip, next Xa level at 0000. Surgery scheduled for tomorrow at 1535 OR-4 with Lazaro Plascencia MD. Dressing changed, leg elevated. Pt complaing of pain rated at 8/10 at the wound site, relieved with scheduled medications. Tolerated diet well, voiding adequate amounts. Will keep monitoring.

## 2021-01-26 NOTE — PROGRESS NOTES
[facility]  Hospitalist Progress Note  Carolina Small MD 01/26/21   Text Page  Pager: [phone] (7am-6pm)    Reason for Stay (Diagnosis): AMS, weakness, hypotension         Assessment and Plan:      Summary of Stay: Todd S Aschoff is a 65 year old male with past medical history of aortic dissection status post mechanical aortic valve plus graft (1992), atrial fibrillation (chronic anticoagulation with Coumadin), chronic pain, dementia and depression who was hospitalized 1/9-1/13 here for left leg wound with drainage (initially caused by a fall approximately 1 month prior) which was found to be infected hematoma requiring I&D (1/10) and ultimately discharged home on oral antibiotics who was admitted on 1/22/2021 with generalized weakness, confusion and hypotension.  Patient was found to have acute CVA.  Also likely had a seizure prior to admission.  Continues to have nonhealing wound on his left lower extremity and plastics is planning debridement and wound VAC placement today.    Discussed with Dr Plascencia after surgery.  Patient had 1400 ml of hematoma evacuated in the OR today and wound vac placed.  Patient needs to have his left leg elevated on several pillows at all times.  He can weight bear as tolerated but cannot bend his left ankle.  Heparin drip can be resumed at midnight.  Continue to hold Coumadin.  Will need to return to the OR on Friday to have the wound vac changed.    Problem List/Assessment and Plan:     Acute right frontal infarct, suspect this is a silent infarct as he has no neurological deficits: Patient did undergo CT of the head which showed age indeterminate right occipital infarct.  MRI of the brain showed an acute infarct in the right frontal vertex and multiple chronic infarcts in the right occipital lobe and bilateral cerebellar hemispheres.  MRA shows possible diffuse stenosis of the left PCA.  TTE showed no change from prior.  He has no neurological deficits.  -Stroke  neurology consulted, appreciate recommendations  -Continue simvastatin 40 mg daily  -Continue Coumadin as PTA  -Follow-up with PCP in 1 to 2 weeks, follow-up with neurology in clinic in 8 weeks    Suspected seizure: Patient's wife describes seizure-like activity prior to the events that led to his hospitalization.  He had a right arm shaking and unresponsiveness.  He does have an abnormal MRI as above which certainly could serve as a seizure focus.  Neurology recommended starting lamotrigine.  -Seizure precautions  -General neurology consulted, appreciate recommendations  -Started lamotrigine 25 mg daily  -Need to follow-up with general neurology in 2 weeks (at that time lamotrigine can be increased to 50 mg daily and EEG can be done)  -No driving, climbing, tub baths or potential dangerous activity in the next 3 months    Left lower extremity infected hematoma status post I&D on 1/10, nonhealing with associated cellulitis.  S/p fall in December 2020.  During his last admission was found to have infected hematoma.  Underwent I&D with Ortho on 1/10.  His wound culture grew E. coli and Enterococcus faecalis.  ID did follow throughout that admission and felt that the pathogen was E. coli.  He was treated with IV ceftriaxone and at time of discharge cefuroxime for an additional 7 days.  He has had dehiscence of the lower wound with surrounding necrosis.  Plastics planning debridement and placement of a wound VAC on 1/26.  Dr. Plascencia is recommending antibiotics for treatment of surrounding cellulitis.   -Orthopedics consulted, appreciate recommendations  -Plastic surgery consulted, appreciate recommendations  -NPO for surgery today  -Currently on heparin drip as INR was reversed for surgery, hold for 6 hours prior to surgery, resume after surgery  -Continue ceftriaxone  -Silvadene twice daily, Wound nurse consulted    Acute toxic/metabolic encephalopathy - Resolved: Presented with diffuse weakness and significant  confusion.  He had significant hypotension.  With hydration his mental status did improve.  I suspect his confusion was multifactorial due to likely seizure, hypotension, acute kidney injury.  See above and below for further treatments.    Acute kidney injury.  Baseline creatinine approximately 1.  On admission creatinine was elevated at 1.58, peaked 1.66.  He is urinating.  He was quite hypotensive on admission which could have lead to ATN causing his renal insufficiency.  UA with 7 RBC, 2 WBC with proteinuria.  FeNA was 2.5.  With IVF his creatinine is improving daily, today 1.22.  -No NSAIDs  -Continue half-normal saline at 125 cc/h  -BMP in the morning    Left Shoulder Pain: Patient had pain in the left shoulder (fall/seizure PTA as above).  Orthopedics consulted.  He has no fracture but does have left RC tendinitis with AC joint arthrosis and probable loose bodies.  He underwent left shoulder subacromial injection on 2/25 and feels that this has helped his pain.  -Follow-up with [facility] in outpatient setting for further work-up if persistent pain/decreased mobility    Hypotension - Resolved.  Patient had rather profound hypotension on presentation.  This improved significantly with fluids.  Blood pressure now is stable.     Chronic anticoagulation with warfarin for mechanical aortic valve and atrial fibrillation.    INR was supratherapeutic at 6.9 (1/23).  INR reversed for surgery with Vitamin K.  He was subsequently started on heparin drip in the perioperative setting.  INR today 1.62.  His goal INR level is 2.5-3.5.    -Heparin drip will need to be resumed after surgery today  -Resume Coumadin this evening as long as this is okay from plastics perspective  -Potential transition to Lovenox for bridging tomorrow as long as no further surgical procedures are planned     Atrial fibrillation.  Resumed the patient on his propafenone.  Anticoagulation plan as above.     Chronic pain.  Patient is on Lyrica.  As needed  "oxycodone also available in addition to Tylenol.     Hypothyroidism.  Patient is on levothyroxine and liothyronine.     Depression.  Continue Effexor.    Dementia.  Continue prior to admission Namenda and Aricept.    Weakness: PT/OT consulted.  Have recommended TCU which patient and his wife are agreeable to.    Diet: Snacks/Supplements Adult: Boost Shake; Between Meals  NPO per Anesthesia Guidelines for Procedure/Surgery Except for: Meds    DVT Prophylaxis: Warfarin  Zimmer Catheter: not present  Code Status: Full Code      Disposition Plan   Expected discharge: 2-4 days, recommended to transitional care unit once antibiotic plan established, safe disposition plan/ TCU bed available and surgical procedures completed.  Entered: Carolina Small MD 01/26/2021, 9:00 AM       The patient's care was discussed with the Bedside Nurse and Patient.    [facility]          Interval History (Subjective):      Patient was discussed with his bedside nurse this morning.  He states he is feeling fine.  He is aware of surgery this afternoon.  No chest pain, shortness of breath, nausea, vomiting.  We discussed his lab results today.  All of his questions were answered.                  Physical Exam:      Last Vital Signs:  BP (!) 158/92   Pulse 77   Temp 98  F (36.7  C) (Temporal)   Resp 20   Ht 1.854 m (6' 1\")   Wt 147.9 kg (326 lb 1.6 oz)   SpO2 97%   BMI 43.02 kg/m      General: Awake, alert, NAD  HEENT: Normocephalic and atraumatic, eyes anicteric and without scleral injection, EOMI, face symmetric, MMM.  Cardiac: RRR, normal S1, S2. No m/g/r, no LE edema.  Pulmonary: Normal chest rise, normal work of breathing.  Lungs CTAB without crackles or wheezing.  Abdomen: soft, non-tender, non-distended.  Normoactive bowel sounds, no guarding or rebound tenderness.  Extremities: no deformities.  Warm, well perfused.  Skin: no rashes or lesions.  Warm and Dry.  Passing over his left lower " extremity wound.  Neuro: No focal deficits.  Speech clear.  Coordination and strength grossly normal.  Psych: Alert and oriented x3. Appropriate affect.         Medications:      All current medications were reviewed with changes reflected in problem list.         Data:      All new lab and imaging data was reviewed.   Labs:  Recent Labs   Lab 01/26/21 0623 01/25/21  0621 01/24/21  1656    142 138   POTASSIUM 4.6 4.4 4.4   CHLORIDE 111* 111* 112*   CO2 30 30 25   ANIONGAP <1* 1* 1*   GLC 88 82 102*   BUN 15 17 21   CR 1.22 1.38* 1.39*   GFRESTIMATED 62 54* 53*   GFRESTBLACK 72 62 62   CATERINA 8.6 8.5 8.1*     Recent Labs   Lab 01/25/21  0621 01/24/21  0551 01/22/21  1105   WBC 6.7 6.1 6.5   HGB 11.2* 11.2* 12.8*   HCT 35.4* 35.2* 40.2    100 100    224 259     Recent Labs   Lab 01/26/21  0623 01/25/21  1615 01/25/21  0621   INR 1.62* 2.08* 2.72*      Imaging:   No results found for this or any previous visit (from the past 24 hour(s)).    Carolina Small MD

## 2021-01-27 ENCOUNTER — APPOINTMENT (OUTPATIENT)
Dept: PHYSICAL THERAPY | Facility: CLINIC | Age: 66
End: 2021-01-27
Payer: COMMERCIAL

## 2021-01-27 ENCOUNTER — APPOINTMENT (OUTPATIENT)
Dept: OCCUPATIONAL THERAPY | Facility: CLINIC | Age: 66
End: 2021-01-27
Attending: HOSPITALIST
Payer: COMMERCIAL

## 2021-01-27 LAB
ANION GAP SERPL CALCULATED.3IONS-SCNC: <1 MMOL/L (ref 3–14)
BUN SERPL-MCNC: 14 MG/DL (ref 7–30)
CALCIUM SERPL-MCNC: 8.6 MG/DL (ref 8.5–10.1)
CHLORIDE SERPL-SCNC: 105 MMOL/L (ref 94–109)
CO2 SERPL-SCNC: 33 MMOL/L (ref 20–32)
CREAT SERPL-MCNC: 1.07 MG/DL (ref 0.66–1.25)
ERYTHROCYTE [DISTWIDTH] IN BLOOD BY AUTOMATED COUNT: 13.5 % (ref 10–15)
GFR SERPL CREATININE-BSD FRML MDRD: 73 ML/MIN/{1.73_M2}
GLUCOSE SERPL-MCNC: 114 MG/DL (ref 70–99)
HCT VFR BLD AUTO: 35.7 % (ref 40–53)
HGB BLD-MCNC: 11.3 G/DL (ref 13.3–17.7)
HGB BLD-MCNC: 11.5 G/DL (ref 13.3–17.7)
INR PPP: 1.36 (ref 0.86–1.14)
IRON SATN MFR SERPL: 15 % (ref 15–46)
IRON SERPL-MCNC: 43 UG/DL (ref 35–180)
MCH RBC QN AUTO: 31.7 PG (ref 26.5–33)
MCHC RBC AUTO-ENTMCNC: 32.2 G/DL (ref 31.5–36.5)
MCV RBC AUTO: 98 FL (ref 78–100)
PLATELET # BLD AUTO: 234 10E9/L (ref 150–450)
POTASSIUM SERPL-SCNC: 4.3 MMOL/L (ref 3.4–5.3)
RBC # BLD AUTO: 3.63 10E12/L (ref 4.4–5.9)
SODIUM SERPL-SCNC: 138 MMOL/L (ref 133–144)
TIBC SERPL-MCNC: 290 UG/DL (ref 240–430)
UFH PPP CHRO-ACNC: 0.32 IU/ML
UFH PPP CHRO-ACNC: 0.56 IU/ML
UFH PPP CHRO-ACNC: 0.59 IU/ML
WBC # BLD AUTO: 7.7 10E9/L (ref 4–11)

## 2021-01-27 PROCEDURE — 250N000011 HC RX IP 250 OP 636: Performed by: STUDENT IN AN ORGANIZED HEALTH CARE EDUCATION/TRAINING PROGRAM

## 2021-01-27 PROCEDURE — 85027 COMPLETE CBC AUTOMATED: CPT | Performed by: STUDENT IN AN ORGANIZED HEALTH CARE EDUCATION/TRAINING PROGRAM

## 2021-01-27 PROCEDURE — 97535 SELF CARE MNGMENT TRAINING: CPT | Mod: GO

## 2021-01-27 PROCEDURE — 83550 IRON BINDING TEST: CPT | Performed by: STUDENT IN AN ORGANIZED HEALTH CARE EDUCATION/TRAINING PROGRAM

## 2021-01-27 PROCEDURE — 120N000001 HC R&B MED SURG/OB

## 2021-01-27 PROCEDURE — 85610 PROTHROMBIN TIME: CPT | Performed by: STUDENT IN AN ORGANIZED HEALTH CARE EDUCATION/TRAINING PROGRAM

## 2021-01-27 PROCEDURE — 80048 BASIC METABOLIC PNL TOTAL CA: CPT | Performed by: STUDENT IN AN ORGANIZED HEALTH CARE EDUCATION/TRAINING PROGRAM

## 2021-01-27 PROCEDURE — 250N000013 HC RX MED GY IP 250 OP 250 PS 637: Performed by: STUDENT IN AN ORGANIZED HEALTH CARE EDUCATION/TRAINING PROGRAM

## 2021-01-27 PROCEDURE — L2820 SOFT INTERFACE BELOW KNEE SE: HCPCS

## 2021-01-27 PROCEDURE — 83540 ASSAY OF IRON: CPT | Performed by: STUDENT IN AN ORGANIZED HEALTH CARE EDUCATION/TRAINING PROGRAM

## 2021-01-27 PROCEDURE — 97530 THERAPEUTIC ACTIVITIES: CPT | Mod: GP | Performed by: PHYSICAL THERAPIST

## 2021-01-27 PROCEDURE — 250N000011 HC RX IP 250 OP 636: Performed by: INTERNAL MEDICINE

## 2021-01-27 PROCEDURE — 99233 SBSQ HOSP IP/OBS HIGH 50: CPT | Performed by: INTERNAL MEDICINE

## 2021-01-27 PROCEDURE — 36415 COLL VENOUS BLD VENIPUNCTURE: CPT | Performed by: STUDENT IN AN ORGANIZED HEALTH CARE EDUCATION/TRAINING PROGRAM

## 2021-01-27 PROCEDURE — 85520 HEPARIN ASSAY: CPT | Performed by: INTERNAL MEDICINE

## 2021-01-27 PROCEDURE — 36415 COLL VENOUS BLD VENIPUNCTURE: CPT | Performed by: INTERNAL MEDICINE

## 2021-01-27 PROCEDURE — L1930 AFO PLASTIC: HCPCS

## 2021-01-27 PROCEDURE — 85520 HEPARIN ASSAY: CPT | Performed by: STUDENT IN AN ORGANIZED HEALTH CARE EDUCATION/TRAINING PROGRAM

## 2021-01-27 PROCEDURE — 258N000002 HC RX IP 258 OP 250: Performed by: STUDENT IN AN ORGANIZED HEALTH CARE EDUCATION/TRAINING PROGRAM

## 2021-01-27 PROCEDURE — 97165 OT EVAL LOW COMPLEX 30 MIN: CPT | Mod: GO

## 2021-01-27 PROCEDURE — 85018 HEMOGLOBIN: CPT | Performed by: INTERNAL MEDICINE

## 2021-01-27 RX ADMIN — LIOTHYRONINE SODIUM 25 MCG: 25 TABLET ORAL at 07:02

## 2021-01-27 RX ADMIN — PROPAFENONE HYDROCHLORIDE 150 MG: 150 TABLET, FILM COATED ORAL at 13:42

## 2021-01-27 RX ADMIN — PREGABALIN 100 MG: 100 CAPSULE ORAL at 19:45

## 2021-01-27 RX ADMIN — MULTIPLE VITAMINS W/ MINERALS TAB 1 TABLET: TAB at 09:09

## 2021-01-27 RX ADMIN — MEMANTINE 10 MG: 5 TABLET ORAL at 19:45

## 2021-01-27 RX ADMIN — OXYCODONE HYDROCHLORIDE 5 MG: 5 TABLET ORAL at 02:13

## 2021-01-27 RX ADMIN — SODIUM CHLORIDE: 4.5 INJECTION, SOLUTION INTRAVENOUS at 02:18

## 2021-01-27 RX ADMIN — GUANFACINE 1 MG: 1 TABLET ORAL at 21:22

## 2021-01-27 RX ADMIN — PROPAFENONE HYDROCHLORIDE 150 MG: 150 TABLET, FILM COATED ORAL at 07:02

## 2021-01-27 RX ADMIN — ZINC SULFATE 220 MG (50 MG) CAPSULE 220 MG: CAPSULE at 09:09

## 2021-01-27 RX ADMIN — ACETAMINOPHEN 1000 MG: 500 TABLET, FILM COATED ORAL at 13:42

## 2021-01-27 RX ADMIN — OXYCODONE HYDROCHLORIDE 5 MG: 5 TABLET ORAL at 15:30

## 2021-01-27 RX ADMIN — DONEPEZIL HYDROCHLORIDE 20 MG: 10 TABLET ORAL at 21:22

## 2021-01-27 RX ADMIN — PROPAFENONE HYDROCHLORIDE 150 MG: 150 TABLET, FILM COATED ORAL at 21:22

## 2021-01-27 RX ADMIN — SIMVASTATIN 40 MG: 40 TABLET, FILM COATED ORAL at 21:22

## 2021-01-27 RX ADMIN — OXYCODONE HYDROCHLORIDE 5 MG: 5 TABLET ORAL at 21:22

## 2021-01-27 RX ADMIN — ACETAMINOPHEN 1000 MG: 500 TABLET, FILM COATED ORAL at 19:45

## 2021-01-27 RX ADMIN — Medication 500 MG: at 09:09

## 2021-01-27 RX ADMIN — MEMANTINE 10 MG: 5 TABLET ORAL at 09:09

## 2021-01-27 RX ADMIN — LAMOTRIGINE 25 MG: 25 TABLET ORAL at 09:09

## 2021-01-27 RX ADMIN — ACETAMINOPHEN 1000 MG: 500 TABLET, FILM COATED ORAL at 09:09

## 2021-01-27 RX ADMIN — FOLIC ACID 5 MG: 1 TABLET ORAL at 09:09

## 2021-01-27 RX ADMIN — PREGABALIN 100 MG: 100 CAPSULE ORAL at 13:42

## 2021-01-27 RX ADMIN — DOCUSATE SODIUM 50 MG AND SENNOSIDES 8.6 MG 1 TABLET: 8.6; 5 TABLET, FILM COATED ORAL at 21:34

## 2021-01-27 RX ADMIN — HEPARIN SODIUM 1500 UNITS/HR: 10000 INJECTION, SOLUTION INTRAVENOUS at 00:56

## 2021-01-27 RX ADMIN — CEFTRIAXONE 1 G: 1 INJECTION, POWDER, FOR SOLUTION INTRAMUSCULAR; INTRAVENOUS at 13:41

## 2021-01-27 RX ADMIN — VENLAFAXINE HYDROCHLORIDE 300 MG: 150 CAPSULE, EXTENDED RELEASE ORAL at 09:09

## 2021-01-27 RX ADMIN — LEVOTHYROXINE SODIUM 150 MCG: 150 TABLET ORAL at 07:01

## 2021-01-27 RX ADMIN — PREGABALIN 100 MG: 100 CAPSULE ORAL at 09:09

## 2021-01-27 RX ADMIN — HEPARIN SODIUM 1300 UNITS/HR: 10000 INJECTION, SOLUTION INTRAVENOUS at 13:17

## 2021-01-27 ASSESSMENT — ACTIVITIES OF DAILY LIVING (ADL)
ADLS_ACUITY_SCORE: 19
ADLS_ACUITY_SCORE: 20
ADLS_ACUITY_SCORE: 20
ADLS_ACUITY_SCORE: 19
ADLS_ACUITY_SCORE: 19
PREVIOUS_RESPONSIBILITIES: LAUNDRY
ADLS_ACUITY_SCORE: 19

## 2021-01-27 NOTE — PROGRESS NOTES
Pt is alert and oriented, lung sound clear. On capno RA.VSS,Up with assist. Tolerating regular diet, passing gas.+ve bowel sounds. Pain controlled with prn Oxy. Dressing is clean dry and intact. LLE elevated on three pillows.Wound vac intact, n drainage yet. Jasen draining dark red output. IVF infusing. Heparin infusing.Frequent voiding. Will continue to monitor.

## 2021-01-27 NOTE — PROGRESS NOTES
Care Management Follow Up    Length of Stay (days): 5    Expected Discharge Date: 01/29/21     Concerns to be Addressed:       Patient plan of care discussed at interdisciplinary rounds: Yes    Anticipated Discharge Disposition: TCU      Anticipated Discharge Services:    Anticipated Discharge DME:      Patient/family educated on Medicare website which has current facility and service quality ratings: yes   Education Provided on the Discharge Plan: yes   Patient/Family in Agreement with the Plan: yes     Referrals Placed by CM/SW: TCU referrals   Private pay costs discussed: transportation costs    Additional Information:  Met briefly with the pt at the bedside to discuss discharge planning to TCU. Pt states to talk with his wife for choices. CM called Lissy, pts wife. Lissy states pt will be going back to the OR on Friday 1/29 to change the wound vac. Lissy also mentions she didn't think he would discharge with a wound vac and will need to go back to the OR again next week. Actual discharge date unknown at this time, hopeful to know more after 1/29. Lissy gives TCU choices in order of:  1. [facility] [address]  2. [facility]  3. [facility]    Request semi private room.  Lissy would like HE WC transport arranged at discharge. Cost was explained to Lissy.    Will send TCU referrals when discharge date is better known.    Brittnee Castro RN, BSN CTS  Care Coordinator  [facility]   [phone]          Melva Castro, RN

## 2021-01-27 NOTE — PROGRESS NOTES
Post surgery. Alert, orientated. Back to room 600.   Iv fluids.   Vitals stable.   Pale. Capnography on. Shallow breathing but oriented and making conversation.   heparin drip to start at midnight.   Reg. Diet. No nausea.   Dressing dry to left leg. Wound vac cont. 75 mmHg. John intaqct, min. Dark red drainage. JOHN to suction.   Voiding continent and incontinent large amount post op.   Oxycodone for pain.   Will monitor closely.   Left leg on blue wedge elevating pillow and an additional pillow. Left ankle in neutral position. Do not bend ankle. No numbness to left ankle.

## 2021-01-27 NOTE — PLAN OF CARE
Pt has denied pain . Pt has been elevating his left leg.  Pt has a JOHN drain that was stripped per Dr. Plascencia . Pt has a wound vac in place and has not had any out put . Pt is waiting for a boot from orthotics.pt has a heparin drip infusing at 1300 units.

## 2021-01-27 NOTE — PROGRESS NOTES
Todd S Aschoff is a 65 year old male patient. Postoperative day 1 s/p left lower extremity hematoma washout, debridement and VAC placement.     Did well overnight. Maintained distal pulses. Feels like his leg is softer with appropriate postsurgical tenderness and he is pleased with his current state.       1. Generalized muscle weakness    2. Hypotension, unspecified hypotension type    3. Polypharmacy    4. Anemia, unspecified type    5. Renal insufficiency      Past Medical History:   Diagnosis Date     Alcohol dependence (H)      Allergy, unspecified not elsewhere classified     seasonal     Antiplatelet or antithrombotic long-term use     coumadin/lovenox     Aortic valve prosthesis present      Atrial fibrillation (H)      Blood transfusion     after heart surg 1992     BPH (benign prostatic hypertrophy)      Chronic infection     low back wound incision not healing      Chronic pain     lower back and right leg and left leg     Coagulation disorder (H)     on blood thinners     Dissection of aorta, thoracic (H) 1992    [facility] aotic valve + arch graft 1992     Headache(784.0)      Heart murmur     aortic valve replaced and arch     History of spinal cord injury      Low back pain      Major depression      Mixed hyperlipidemia      Numbness and tingling     right leg post surg rightt hip/ also left leg since surg     OA (osteoarthritis)     hips     Obesity, unspecified      Prostate infection      Sciatica 2002    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     Allergies   Allergen Reactions     Gabapentin      Severe behavioral disturbances     Active Problems:    Polypharmacy    Renal insufficiency    Generalized muscle weakness    Hypotension, unspecified hypotension type    Anemia, unspecified type    Blood pressure 113/57, pulse 75, temperature 97.4  F (36.3  C), temperature source Temporal, resp. rate 16, height 1.854 m (6'  "1\"), weight 147.9 kg (326 lb 1.6 oz), SpO2 95 %.    Subjective     Doing well with VAC therapy.  Objective     H&H Stable  No Rebleed despite restarting heparin drip (mechanical valve).  Admission on 01/09/2021, Discharged on 01/13/2021   Component Date Value Ref Range Status     WBC 01/09/2021 6.0  4.0 - 11.0 10e9/L Final     RBC Count 01/09/2021 4.83  4.4 - 5.9 10e12/L Final     Hemoglobin 01/09/2021 15.6  13.3 - 17.7 g/dL Final     Hematocrit 01/09/2021 47.3  40.0 - 53.0 % Final     MCV 01/09/2021 98  78 - 100 fl Final     MCH 01/09/2021 32.3  26.5 - 33.0 pg Final     MCHC 01/09/2021 33.0  31.5 - 36.5 g/dL Final     RDW 01/09/2021 13.9  10.0 - 15.0 % Final     Platelet Count 01/09/2021 246  150 - 450 10e9/L Final     Diff Method 01/09/2021 Automated Method   Final     % Neutrophils 01/09/2021 64.7  % Final     % Lymphocytes 01/09/2021 23.3  % Final     % Monocytes 01/09/2021 9.0  % Final     % Eosinophils 01/09/2021 2.2  % Final     % Basophils 01/09/2021 0.5  % Final     % Immature Granulocytes 01/09/2021 0.3  % Final     Nucleated RBCs 01/09/2021 0  0 /100 Final     Absolute Neutrophil 01/09/2021 3.9  1.6 - 8.3 10e9/L Final     Absolute Lymphocytes 01/09/2021 1.4  0.8 - 5.3 10e9/L Final     Absolute Monocytes 01/09/2021 0.5  0.0 - 1.3 10e9/L Final     Absolute Eosinophils 01/09/2021 0.1  0.0 - 0.7 10e9/L Final     Absolute Basophils 01/09/2021 0.0  0.0 - 0.2 10e9/L Final     Abs Immature Granulocytes 01/09/2021 0.0  0 - 0.4 10e9/L Final     Absolute Nucleated RBC 01/09/2021 0.0   Final     Sodium 01/09/2021 140  133 - 144 mmol/L Final     Potassium 01/09/2021 4.3  3.4 - 5.3 mmol/L Final     Chloride 01/09/2021 105  94 - 109 mmol/L Final     Carbon Dioxide 01/09/2021 34* 20 - 32 mmol/L Final     Anion Gap 01/09/2021 1* 3 - 14 mmol/L Final     Glucose 01/09/2021 146* 70 - 99 mg/dL Final     Urea Nitrogen 01/09/2021 13  7 - 30 mg/dL Final     Creatinine 01/09/2021 1.02  0.66 - 1.25 mg/dL Final     GFR Estimate " 01/09/2021 77  >60 mL/min/[1.73_m2] Final    Comment: Non  GFR Calc  Starting 12/18/2018, serum creatinine based estimated GFR (eGFR) will be   calculated using the Chronic Kidney Disease Epidemiology Collaboration   (CKD-EPI) equation.       GFR Estimate If Black 01/09/2021 90  >60 mL/min/[1.73_m2] Final    Comment:  GFR Calc  Starting 12/18/2018, serum creatinine based estimated GFR (eGFR) will be   calculated using the Chronic Kidney Disease Epidemiology Collaboration   (CKD-EPI) equation.       Calcium 01/09/2021 8.7  8.5 - 10.1 mg/dL Final     Bilirubin Total 01/09/2021 1.0  0.2 - 1.3 mg/dL Final     Albumin 01/09/2021 3.4  3.4 - 5.0 g/dL Final     Protein Total 01/09/2021 6.9  6.8 - 8.8 g/dL Final     Alkaline Phosphatase 01/09/2021 96  40 - 150 U/L Final     ALT 01/09/2021 23  0 - 70 U/L Final     AST 01/09/2021 28  0 - 45 U/L Final     INR 01/09/2021 3.26* 0.86 - 1.14 Final     PTT 01/09/2021 46* 22 - 37 sec Final     Specimen Description 01/09/2021 Blood Left Arm   Final     Culture Micro 01/09/2021 No growth   Final     Specimen Description 01/09/2021 Blood Left Hand   Final     Culture Micro 01/09/2021 No growth   Final     Creatinine 01/09/2021 1.0  0.66 - 1.25 mg/dL Final     GFR Estimate 01/09/2021 75  >60 mL/min/[1.73_m2] Final     GFR Estimate If Black 01/09/2021 >90  >60 mL/min/[1.73_m2] Final     SARS-CoV-2 Virus Specimen Source 01/09/2021 Nasopharyngeal   Final     SARS-CoV-2 PCR Result 01/09/2021 NEGATIVE   Final    SARS-CoV2 (COVID-19) RNA not detected, presumed negative.     SARS-CoV-2 PCR Comment 01/09/2021 (Note)   Final    Comment: Testing was performed using the claudine SARS-CoV-2 & Influenza A/B Assay on the   claudine Angie System.  This test should be ordered for the detection of SARS-COV-2 in individuals who   meet SARS-CoV-2 clinical and/or epidemiological criteria. Test performance is   unknown in asymptomatic patients.  This test is for in vitro diagnostic  use under the FDA EUA for laboratories   certified under CLIA to perform moderate and/or high complexity testing. This   test has not been FDA cleared or approved.  A negative test does not rule out the presence of PCR inhibitors in the   specimen or target RNA in concentration below the limit of detection for the   assay. The possibility of a false negative should be considered if the   patient's recent exposure or clinical presentation suggests COVID-19.  [facility] are certified under the Clinical Laboratory   Improvement Amendments of 1988 (CLIA-88) as qualified to perform moderate   and/or high complexity laboratory testing.       INR 01/09/2021 3.27* 0.86 - 1.14 Final     INR 01/09/2021 2.74* 0.86 - 1.14 Final     INR 01/10/2021 2.22* 0.86 - 1.14 Final     WBC 01/10/2021 6.0  4.0 - 11.0 10e9/L Final     RBC Count 01/10/2021 4.61  4.4 - 5.9 10e12/L Final     Hemoglobin 01/10/2021 14.7  13.3 - 17.7 g/dL Final     Hematocrit 01/10/2021 45.7  40.0 - 53.0 % Final     MCV 01/10/2021 99  78 - 100 fl Final     MCH 01/10/2021 31.9  26.5 - 33.0 pg Final     MCHC 01/10/2021 32.2  31.5 - 36.5 g/dL Final     RDW 01/10/2021 14.0  10.0 - 15.0 % Final     Platelet Count 01/10/2021 214  150 - 450 10e9/L Final     Sodium 01/10/2021 138  133 - 144 mmol/L Final     Potassium 01/10/2021 3.8  3.4 - 5.3 mmol/L Final     Chloride 01/10/2021 107  94 - 109 mmol/L Final     Carbon Dioxide 01/10/2021 30  20 - 32 mmol/L Final     Anion Gap 01/10/2021 1* 3 - 14 mmol/L Final     Glucose 01/10/2021 85  70 - 99 mg/dL Final     Urea Nitrogen 01/10/2021 15  7 - 30 mg/dL Final     Creatinine 01/10/2021 0.93  0.66 - 1.25 mg/dL Final     GFR Estimate 01/10/2021 87  >60 mL/min/[1.73_m2] Final    Comment: Non  GFR Calc  Starting 12/18/2018, serum creatinine based estimated GFR (eGFR) will be   calculated using the Chronic Kidney Disease Epidemiology Collaboration   (CKD-EPI) equation.       GFR Estimate If Black  01/10/2021 >90  >60 mL/min/[1.73_m2] Final    Comment:  GFR Calc  Starting 12/18/2018, serum creatinine based estimated GFR (eGFR) will be   calculated using the Chronic Kidney Disease Epidemiology Collaboration   (CKD-EPI) equation.       Calcium 01/10/2021 8.8  8.5 - 10.1 mg/dL Final     INR 01/10/2021 1.70* 0.86 - 1.14 Final     INR 01/10/2021 1.88* 0.86 - 1.14 Final     Specimen Description 01/10/2021 Tissue Left LOWER LEG SPECIMEN 1   Final     Special Requests 01/10/2021 Surgery   Final     Culture Micro 01/10/2021 *  Final                    Value:Light growth  Mixed aerobic and anaerobic esther       Culture Micro 01/10/2021 No predominant anaerobes   Final     Specimen Description 01/10/2021 Tissue Left Leg SPECIMEN 1   Final     Special Requests 01/10/2021    Final                    Value:Surgery  Specimen collected in surgery       Gram Stain 01/10/2021 No organisms seen   Final     Gram Stain 01/10/2021    Final                    Value:Rare  WBC'S seen       Specimen Description 01/10/2021 Left Leg SPECIMEN 1   Final     Special Requests 01/10/2021    Final                    Value:Surgery  Specimen collected in surgery       Culture Micro 01/10/2021 *  Final                    Value:Moderate growth  Escherichia coli       Culture Micro 01/10/2021 *  Final                    Value:Moderate growth  Citrobacter koseri       Culture Micro 01/10/2021 *  Final                    Value:Light growth  Enterococcus faecalis       PTT 01/10/2021 36  22 - 37 sec Final     Heparin Unfractionated Anti Xa Lev* 01/10/2021 <0.10  IU/mL Final    Comment: Therapeutic Range:                UFH: 0.25-0.50 IU/mL for low                     intensity dosing                     0.30-0.70 IU/mL for high                     intensity dosing for                     DVT and PE  This test is not validated for other direct factor X inhibitors (e.g.   rivaroxaban, apixaban, edoxaban, betrixaban, fondaparinux) and  should not be   used for monitoring of other medications.       Heparin Unfractionated Anti Xa Lev* 01/10/2021 0.33  IU/mL Final    Comment: Therapeutic Range:                UFH: 0.25-0.50 IU/mL for low                     intensity dosing                     0.30-0.70 IU/mL for high                     intensity dosing for                     DVT and PE  This test is not validated for other direct factor X inhibitors (e.g.   rivaroxaban, apixaban, edoxaban, betrixaban, fondaparinux) and should not be   used for monitoring of other medications.       Heparin Unfractionated Anti Xa Lev* 01/11/2021 0.43  IU/mL Final    Comment: Therapeutic Range:                UFH: 0.25-0.50 IU/mL for low                     intensity dosing                     0.30-0.70 IU/mL for high                     intensity dosing for                     DVT and PE  This test is not validated for other direct factor X inhibitors (e.g.   rivaroxaban, apixaban, edoxaban, betrixaban, fondaparinux) and should not be   used for monitoring of other medications.       Hemoglobin 01/11/2021 13.6  13.3 - 17.7 g/dL Final     INR 01/11/2021 1.36* 0.86 - 1.14 Final     Glucose 01/11/2021 100* 70 - 99 mg/dL Final     Hemoglobin 01/12/2021 13.8  13.3 - 17.7 g/dL Final     INR 01/12/2021 1.47* 0.86 - 1.14 Final     Sodium 01/12/2021 137  133 - 144 mmol/L Final     Potassium 01/12/2021 4.8  3.4 - 5.3 mmol/L Final     Chloride 01/12/2021 101  94 - 109 mmol/L Final     Carbon Dioxide 01/12/2021 37* 20 - 32 mmol/L Final     Anion Gap 01/12/2021 <1* 3 - 14 mmol/L Final     Glucose 01/12/2021 116* 70 - 99 mg/dL Final     Urea Nitrogen 01/12/2021 10  7 - 30 mg/dL Final     Creatinine 01/12/2021 0.97  0.66 - 1.25 mg/dL Final     GFR Estimate 01/12/2021 82  >60 mL/min/[1.73_m2] Final    Comment: Non  GFR Calc  Starting 12/18/2018, serum creatinine based estimated GFR (eGFR) will be   calculated using the Chronic Kidney Disease Epidemiology  "Collaboration   (CKD-EPI) equation.       GFR Estimate If Black 01/12/2021 >90  >60 mL/min/[1.73_m2] Final    Comment:  GFR Calc  Starting 12/18/2018, serum creatinine based estimated GFR (eGFR) will be   calculated using the Chronic Kidney Disease Epidemiology Collaboration   (CKD-EPI) equation.       Calcium 01/12/2021 9.0  8.5 - 10.1 mg/dL Final     INR 01/13/2021 2.01* 0.86 - 1.14 Final     WBC 01/13/2021 6.8  4.0 - 11.0 10e9/L Final     RBC Count 01/13/2021 3.87* 4.4 - 5.9 10e12/L Final     Hemoglobin 01/13/2021 12.3* 13.3 - 17.7 g/dL Final     Hematocrit 01/13/2021 39.4* 40.0 - 53.0 % Final     MCV 01/13/2021 102* 78 - 100 fl Final     MCH 01/13/2021 31.8  26.5 - 33.0 pg Final     MCHC 01/13/2021 31.2* 31.5 - 36.5 g/dL Final     RDW 01/13/2021 14.1  10.0 - 15.0 % Final     Platelet Count 01/13/2021 190  150 - 450 10e9/L Final     Removed Left Leg Ace Wrap and Kerlix. Drain in place holding suction. S/S output. VAC Holding suction with scant output. Soft calf.   Assessment & Plan    Continue VAC therapy    Continue Drain     VAC change tomorrow. Please note there are only 2 sponges with no tunneled black foam. There is an external bridge of foam to allow the two sites to be connected without \"two lillypads\". After tomorrows VAC change we can begin 3 times a week VAC changes at bedside and I anticipate performing skin graft to healthy granulation tissue bed or perhaps primary closure if the edema is reduced enough for tension free closure.    OK to ambulate. Weight Bearing as tolerated.   Lazaro Plascencia MD  1/27/2021    "

## 2021-01-27 NOTE — PROGRESS NOTES
[facility]  Hospitalist Progress Note  Carolina Small MD 01/27/21   Text Page  Pager: [phone] (7am-6pm)    Reason for Stay (Diagnosis): AMS, weakness, hypotension         Assessment and Plan:      Summary of Stay: Todd S Aschoff is a 65 year old male with past medical history of aortic dissection status post mechanical aortic valve plus graft (1992), atrial fibrillation (chronic anticoagulation with Coumadin), chronic pain, dementia and depression who was hospitalized 1/9-1/13 here for left leg wound with drainage (initially caused by a fall approximately 1 month prior) which was found to be infected hematoma requiring I&D (1/10) and ultimately discharged home on oral antibiotics who was admitted on 1/22/2021 with generalized weakness, confusion and hypotension.  Patient was found to have acute CVA.  Also likely had a seizure prior to admission.      Patient underwent surgery with plastics on 1/26 with removal of 1400 mL of hematoma (no active bleeding) and placement of wound vac.  Will go back to OR on 1/29 to change wound vac.  Will remain off Coumadin and on heparin drip until that time.    Addendum: Spoke with Dr. Plascencia this afternoon.  At this time the wound is looking good and no further worsening anemia or significant drainage.  Cancel OR for Friday.  Wound vac should be changed by wound nurse tomorrow and then patient can be on a [facility] wound vac change schedule.  He will be present for wound vac change next Wednesday to see if the wound is ready for a skin graft.  For now will continue heparin and continue to hold Coumadin in case he does require another surgical procedure.  He likely will have the skin grafting done this admission.    Problem List/Assessment and Plan:     Acute right frontal infarct, suspect this is a silent infarct as he has no neurological deficits: Patient did undergo CT of the head which showed age indeterminate right occipital infarct.  MRI of the brain showed an  acute infarct in the right frontal vertex and multiple chronic infarcts in the right occipital lobe and bilateral cerebellar hemispheres.  MRA shows possible diffuse stenosis of the left PCA.  TTE showed no change from prior.  He has no neurological deficits.  -Stroke neurology consulted, appreciate recommendations  -Continue simvastatin 40 mg daily  -Continue Coumadin as PTA  -Follow-up with PCP in 1 to 2 weeks, follow-up with neurology in clinic in 8 weeks    Suspected seizure: Patient's wife describes seizure-like activity prior to the events that led to his hospitalization.  He had a right arm shaking and unresponsiveness.  He does have an abnormal MRI as above which certainly could serve as a seizure focus.  Neurology recommended starting lamotrigine. No further seizure activity.  -Seizure precautions  -General neurology consulted, appreciate recommendations  -Started lamotrigine 25 mg daily  -Need to follow-up with general neurology in 2 weeks (at that time lamotrigine can be increased to 50 mg daily and EEG can be done)  -No driving, climbing, tub baths or potential dangerous activity in the next 3 months    Left lower extremity infected hematoma status post I&D on 1/10, nonhealing with associated cellulitis s/p Large hematoma evaluation and placement of wound vac on 1/26.  S/p fall in December 2020.  During his last admission was found to have infected hematoma.  Underwent I&D with Ortho on 1/10.  His wound culture grew E. coli and Enterococcus faecalis.  ID did follow throughout that admission and felt that the pathogen was E. coli.  He was treated with IV ceftriaxone and at time of discharge cefuroxime for an additional 7 days.  He has had dehiscence of the lower wound with surrounding necrosis.  He underwent I&D with evacuation of 1400 mL hematoma (no active bleeding) on 1/26 with placement of wound vac.  Will need to go back to OR to change wound vac on 1/29.   -Orthopedics consulted, appreciate  recommendations  -Plastic surgery consulted, appreciate recommendations  -Planning OR on 1/29 with Plastics to change wound vac  -Currently on heparin drip, continue to hold Coumadin as he will go back to OR on 1/29  -Continue ceftriaxone  -Wound nurse consulted, appreciate recommendations  -Will need to be n.p.o. at midnight on 1/28    Acute toxic/metabolic encephalopathy - Resolved: Presented with diffuse weakness and significant confusion.  He had significant hypotension.  With hydration his mental status did improve.  I suspect his confusion was multifactorial due to likely seizure, hypotension, acute kidney injury.  See above and below for further treatments.    Acute kidney injury.  Baseline creatinine approximately 1.  On admission creatinine was elevated at 1.58, peaked 1.66.  He is urinating.  He was quite hypotensive on admission which could have lead to ATN causing his renal insufficiency.  UA with 7 RBC, 2 WBC with proteinuria.  FeNA was 2.5.  With IVF his creatinine is improving daily, now back to baseline of 1.07.  -No NSAIDs  -Stop IVF  -BMP in the morning    Left Shoulder Pain: Patient had pain in the left shoulder (fall/seizure PTA as above).  Orthopedics consulted.  He has no fracture but does have left RC tendinitis with AC joint arthrosis and probable loose bodies.  He underwent left shoulder subacromial injection on 2/25 and feels that this has helped his pain.  -Follow-up with [facility] in outpatient setting for further work-up if persistent pain/decreased mobility    Hypotension - Resolved: Patient had rather profound hypotension on presentation.  This improved significantly with fluids.  Blood pressure now is stable.     Chronic anticoagulation with warfarin for mechanical aortic valve and atrial fibrillation. INR was supratherapeutic at 6.9 (1/23).  INR reversed for surgery with Vitamin K.  He was subsequently started on heparin drip in the perioperative setting.  His goal INR level is 2.5-3.5.     -Heparin drip to be continued due to planned surgery on 1/29  -Continue to hold Coumadin (surgery 1/29)  -Likely transition to Lovenox for bridging tomorrow after surgical procedures completed    Acute Anemia: Historically Hgb had been normal, decreased to 12 range during his last hospitalization.  This admission was 12.8 on admission, has decreased to 11.5 but stable over the past few days.  Did have large hematoma evacuated of the LLE on 1/26 as above, no active bleeding.  Iron checked and normal. No indication for transfusion at this time.  -Daily Hgb     Atrial fibrillation.  Resumed the patient on his propafenone.  Anticoagulation plan as above.     Chronic pain.  Patient is on Lyrica.  As needed oxycodone also available in addition to Tylenol.     Hypothyroidism.  Patient is on levothyroxine and liothyronine.     Depression.  Continue Effexor.    Dementia.  Continue prior to admission Namenda and Aricept.    Weakness: PT/OT consulted.  Have recommended TCU which patient and his wife are agreeable to.    Diet: Snacks/Supplements Adult: Boost Shake; Between Meals  Regular Diet Adult    DVT Prophylaxis: Warfarin  Zimmer Catheter: not present  Code Status: Full Code      Disposition Plan   Expected discharge: 2-4 days, recommended to transitional care unit once antibiotic plan established, safe disposition plan/ TCU bed available and surgical procedures completed.  Entered: Carolina Small MD 01/27/2021, 9:02 AM       The patient's care was discussed with the Bedside Nurse, Care Coordinator/, Patient and Patient's Family.    Hospitalist Service  [facility]          Interval History (Subjective):      Patient was seen with his bedside nurse this morning.  We discussed his surgery yesterday.  Explained he needs to keep his left ankle neutral and left leg elevated.  Discussed plans for OR on Friday with change of his wound vac.  He is feeling well.  No CP, SOB, nausea, emesis,  "abdominal pain.  Pain controlled with medications in the left leg.  Denies dizziness or lightheadedness.  He states that he is updating his wife.    I did call the patient's wife by phone today.  I reviewed his surgical course and explained that he will be going back to the OR on 1/29.  I discussed his clinical conditions and all of her questions were answered.    I also discussed with social work that his wife is waiting for a call to start the process of discharge planning to TCU.                  Physical Exam:      Last Vital Signs:  /77 (BP Location: Left arm)   Pulse 70   Temp 98.3  F (36.8  C) (Temporal)   Resp 12   Ht 1.854 m (6' 1\")   Wt 147.9 kg (326 lb 1.6 oz)   SpO2 95%   BMI 43.02 kg/m      General: Awake, alert, NAD  HEENT: Normocephalic and atraumatic, eyes anicteric and without scleral injection, EOMI, face symmetric, MMM.  Cardiac: RRR, normal S1, S2. No m/g/r, no LE edema.  Pulmonary: Normal chest rise, normal work of breathing.  Lungs CTAB without crackles or wheezing.  Abdomen: soft, non-tender, non-distended.  Normoactive bowel sounds, no guarding or rebound tenderness.  Extremities: no deformities.  Warm, well perfused.  Skin: no rashes or lesions.  Warm and Dry.  Wound vac in place with ACE wrap from left knee to ankle, drain also present.  Neuro: No focal deficits.  Speech clear.  Coordination and strength grossly normal.  Psych: Alert and oriented x3. Appropriate affect.         Medications:      All current medications were reviewed with changes reflected in problem list.         Data:      All new lab and imaging data was reviewed.   Labs:  Recent Labs   Lab 01/27/21 0559 01/26/21 0623 01/25/21 0621    141 142   POTASSIUM 4.3 4.6 4.4   CHLORIDE 105 111* 111*   CO2 33* 30 30   ANIONGAP <1* <1* 1*   * 88 82   BUN 14 15 17   CR 1.07 1.22 1.38*   GFRESTIMATED 73 62 54*   GFRESTBLACK 85 72 62   CATERINA 8.6 8.6 8.5     Recent Labs   Lab 01/27/21  0559 01/25/21 0621 " 01/24/21  0551   WBC 7.7 6.7 6.1   HGB 11.5* 11.2* 11.2*   HCT 35.7* 35.4* 35.2*   MCV 98 100 100    219 224     Recent Labs   Lab 01/27/21  0559 01/26/21  0623 01/25/21  1615   INR 1.36* 1.62* 2.08*      Imaging:   No results found for this or any previous visit (from the past 24 hour(s)).    Carolina Small MD

## 2021-01-27 NOTE — PROGRESS NOTES
01/27/21 0900   Quick Adds   Type of Visit Initial Occupational Therapy Evaluation   Living Environment   People in home spouse;grandchild(morgan)   Current Living Arrangements house   Home Accessibility stairs within home;stairs to enter home   Number of Stairs, Main Entrance 7   Stair Railings, Main Entrance railings safe and in good condition   Number of Stairs, Within Home, Primary 7   Stair Railings, Within Home, Primary railings safe and in good condition   Transportation Anticipated family or friend will provide   Living Environment Comments 14 stairs to navigate total with railings in safe condition. Pt utilizes SEC or FWW at baseline. Pt has a tub/shower combo with no AE.    Self-Care   Usual Activity Tolerance moderate   Current Activity Tolerance poor   Regular Exercise No   Equipment Currently Used at Home cane, straight;walker, standard;raised toilet seat   Disability/Function   Hearing Difficulty or Deaf yes   Patient's preferred means of communication verbal   Describe hearing loss bilateral hearing loss   Use of hearing assistive devices none   Were auxiliary aids offered? yes   The following aids were provided; patient declined offer of auxiliary aids   Hearing Management High VOV   Wear Glasses or Blind yes   Vision Management Glasses daily   Concentrating, Remembering or Making Decisions Difficulty yes   Difficulty Communicating no   Difficulty Eating/Swallowing no   Walking or Climbing Stairs Difficulty yes   Walking or Climbing Stairs ambulation difficulty, requires equipment   Mobility Management FWW or SEC   Dressing/Bathing Difficulty no   Toileting issues no   Doing Errands Independently Difficulty (such as shopping) no   Fall history within last six months yes   Number of times patient has fallen within last six months 2   Change in Functional Status Since Onset of Current Illness/Injury yes   General Information   Onset of Illness/Injury or Date of Surgery 01/22/21   Referring Physician  Mathew Mcfadden MD   Patient/Family Therapy Goal Statement (OT) Return home   Additional Occupational Profile Info/Pertinent History of Current Problem Todd S Aschoff is a 65 year old male with past medical history of aortic dissection status post mechanical aortic valve plus graft (1992), atrial fibrillation (chronic anticoagulation with Coumadin), chronic pain, dementia and depression who was hospitalized 1/9-1/13 here for left leg wound with drainage (initially caused by a fall approximately 1 month prior) which was found to be infected hematoma requiring I&D (1/10) and ultimately discharged home on oral antibiotics who was admitted on 1/22/2021 with generalized weakness, confusion and hypotension.   Performance Patterns (Routines, Roles, Habits) Pt ind with I/ADL's at baseline   Left Lower Extremity (Weight-bearing Status) weight-bearing as tolerated (WBAT)  (Cannot bend ankle)   General Observations and Info Pt demo'd positive demeanor and agreeable to therapy   Cognitive Status Examination   Orientation Status place;person   Affect/Mental Status (Cognitive) WNL   Follows Commands WNL   Visual Perception   Visual Impairment/Limitations corrective lenses full-time   Sensory   Sensory Quick Adds No deficits were identified   Pain Assessment   Patient Currently in Pain Yes, see Vital Sign flowsheet   Posture   Posture not impaired   Range of Motion Comprehensive   General Range of Motion no range of motion deficits identified   Comment, General Range of Motion BUE ROM WFL; noted no significant difference between BUE shoulders   Strength Comprehensive (MMT)   General Manual Muscle Testing (MMT) Assessment no strength deficits identified   Comment, General Manual Muscle Testing (MMT) Assessment BUE MMT shoulder flexion 4/5.    Muscle Tone Assessment   Muscle Tone Quick Adds No deficits were identified   Coordination   Upper Extremity Coordination No deficits were identified   Bed Mobility   Bed Mobility  supine-sit;sit-supine   Supine-Sit Julian (Bed Mobility) minimum assist (75% patient effort);verbal cues   Sit-Supine Julian (Bed Mobility) minimum assist (75% patient effort);verbal cues   Assistive Device (Bed Mobility) bed rails   Transfers   Transfers sit-stand transfer   Sit-Stand Transfer   Sit-Stand Julian (Transfers) minimum assist (75% patient effort)   Assistive Device (Sit-Stand Transfers) walker, standard   Balance   Balance Comments Falls risk due to LLE ROM restrictions   Activities of Daily Living   BADL Assessment/Intervention upper body dressing;lower body dressing;grooming;feeding;toileting   Upper Body Dressing Assessment/Training   Julian Level (Upper Body Dressing) set up;supervision   Position (Upper Body Dressing) edge of bed sitting   Lower Body Dressing Assessment/Training   Julian Level (Lower Body Dressing) moderate assist (50% patient effort)   Position (Lower Body Dressing) edge of bed sitting   Comment (Lower Body Dressing) Increased difficulty due to LLE dressing and ROM restrictions   Grooming Assessment/Training   Julian Level (Grooming) supervision;verbal cues   Position (Grooming) edge of bed sitting   Eating/Self Feeding   Julian Level (Feeding) independent   Toileting   Julian Level (Toileting) not tested   Comment (Toileting) Pt currently utilizing urinal for toileting in bed   Instrumental Activities of Daily Living (IADL)   Previous Responsibilities laundry   IADL Comments Family assists with I/ADL's   Clinical Impression   Criteria for Skilled Therapeutic Interventions Met (OT) yes   OT Diagnosis Decreased ind with I/ADL's   OT Problem List-Impairments impacting ADL activity tolerance impaired;balance;pain;strength   ADL comments/analysis Ind with ADL's at baseline   Assessment of Occupational Performance 1-3 Performance Deficits   Identified Performance Deficits Dressing, toileting, showering   Planned Therapy Interventions  (OT) ADL retraining;IADL retraining;strengthening;progressive activity/exercise   Clinical Decision Making Complexity (OT) low complexity   Therapy Frequency (OT) Daily   Predicted Duration of Therapy 3 days   Risks and Benefits of Treatment have been explained. Yes   Patient, Family & other staff in agreement with plan of care Yes   OT Discharge Planning    OT Discharge Recommendation (DC Rec) Transitional Care Facility   OT Rationale for DC Rec Pt performing below baseline and would benefit from continued skilled OT services prior to returning home. Pt has stairs to navigate. Pt reported spending most of time in bed at home.    OT Brief overview of current status  Min A bed mobility and sit <> stand; mod A LB dressing at EOB; precautions no flexion/extension to LLE ankle; pt in need of LLE ankle ortho boot for OOB activity and adherence to precautions   Total Evaluation Time (Minutes)   Total Evaluation Time (Minutes) 8

## 2021-01-27 NOTE — PROGRESS NOTES
Notified [facility] that Plastic Surgeon would like wound vac changed at bedside tomorrow 1/28.  Recommendations in notes.

## 2021-01-28 ENCOUNTER — APPOINTMENT (OUTPATIENT)
Dept: OCCUPATIONAL THERAPY | Facility: CLINIC | Age: 66
End: 2021-01-28
Payer: COMMERCIAL

## 2021-01-28 ENCOUNTER — APPOINTMENT (OUTPATIENT)
Dept: PHYSICAL THERAPY | Facility: CLINIC | Age: 66
End: 2021-01-28
Payer: COMMERCIAL

## 2021-01-28 LAB
ANION GAP SERPL CALCULATED.3IONS-SCNC: <1 MMOL/L (ref 3–14)
BACTERIA SPEC CULT: NO GROWTH
BACTERIA SPEC CULT: NO GROWTH
BUN SERPL-MCNC: 17 MG/DL (ref 7–30)
CALCIUM SERPL-MCNC: 8.7 MG/DL (ref 8.5–10.1)
CHLORIDE SERPL-SCNC: 106 MMOL/L (ref 94–109)
CO2 SERPL-SCNC: 35 MMOL/L (ref 20–32)
CREAT SERPL-MCNC: 1.19 MG/DL (ref 0.66–1.25)
ERYTHROCYTE [DISTWIDTH] IN BLOOD BY AUTOMATED COUNT: 14.1 % (ref 10–15)
GFR SERPL CREATININE-BSD FRML MDRD: 64 ML/MIN/{1.73_M2}
GLUCOSE SERPL-MCNC: 97 MG/DL (ref 70–99)
HCT VFR BLD AUTO: 36.1 % (ref 40–53)
HGB BLD-MCNC: 11.3 G/DL (ref 13.3–17.7)
HGB BLD-MCNC: 11.6 G/DL (ref 13.3–17.7)
INR PPP: 1.17 (ref 0.86–1.14)
MCH RBC QN AUTO: 31.3 PG (ref 26.5–33)
MCHC RBC AUTO-ENTMCNC: 31.3 G/DL (ref 31.5–36.5)
MCV RBC AUTO: 100 FL (ref 78–100)
PLATELET # BLD AUTO: 239 10E9/L (ref 150–450)
POTASSIUM SERPL-SCNC: 4.3 MMOL/L (ref 3.4–5.3)
RBC # BLD AUTO: 3.61 10E12/L (ref 4.4–5.9)
SODIUM SERPL-SCNC: 140 MMOL/L (ref 133–144)
SPECIMEN SOURCE: NORMAL
SPECIMEN SOURCE: NORMAL
UFH PPP CHRO-ACNC: 0.34 IU/ML
WBC # BLD AUTO: 8.7 10E9/L (ref 4–11)

## 2021-01-28 PROCEDURE — 36415 COLL VENOUS BLD VENIPUNCTURE: CPT | Performed by: INTERNAL MEDICINE

## 2021-01-28 PROCEDURE — 99232 SBSQ HOSP IP/OBS MODERATE 35: CPT | Performed by: INTERNAL MEDICINE

## 2021-01-28 PROCEDURE — 97530 THERAPEUTIC ACTIVITIES: CPT | Mod: GP | Performed by: PHYSICAL THERAPIST

## 2021-01-28 PROCEDURE — G0463 HOSPITAL OUTPT CLINIC VISIT: HCPCS | Mod: 25

## 2021-01-28 PROCEDURE — 250N000013 HC RX MED GY IP 250 OP 250 PS 637: Performed by: STUDENT IN AN ORGANIZED HEALTH CARE EDUCATION/TRAINING PROGRAM

## 2021-01-28 PROCEDURE — 85520 HEPARIN ASSAY: CPT | Performed by: STUDENT IN AN ORGANIZED HEALTH CARE EDUCATION/TRAINING PROGRAM

## 2021-01-28 PROCEDURE — 85027 COMPLETE CBC AUTOMATED: CPT | Performed by: STUDENT IN AN ORGANIZED HEALTH CARE EDUCATION/TRAINING PROGRAM

## 2021-01-28 PROCEDURE — 85018 HEMOGLOBIN: CPT | Performed by: INTERNAL MEDICINE

## 2021-01-28 PROCEDURE — 120N000001 HC R&B MED SURG/OB

## 2021-01-28 PROCEDURE — 97535 SELF CARE MNGMENT TRAINING: CPT | Mod: GO | Performed by: OCCUPATIONAL THERAPIST

## 2021-01-28 PROCEDURE — 80048 BASIC METABOLIC PNL TOTAL CA: CPT | Performed by: STUDENT IN AN ORGANIZED HEALTH CARE EDUCATION/TRAINING PROGRAM

## 2021-01-28 PROCEDURE — 36415 COLL VENOUS BLD VENIPUNCTURE: CPT | Performed by: STUDENT IN AN ORGANIZED HEALTH CARE EDUCATION/TRAINING PROGRAM

## 2021-01-28 PROCEDURE — 97605 NEG PRS WND THER DME<=50SQCM: CPT

## 2021-01-28 PROCEDURE — 250N000011 HC RX IP 250 OP 636: Performed by: INTERNAL MEDICINE

## 2021-01-28 PROCEDURE — 85610 PROTHROMBIN TIME: CPT | Performed by: STUDENT IN AN ORGANIZED HEALTH CARE EDUCATION/TRAINING PROGRAM

## 2021-01-28 RX ADMIN — PREGABALIN 100 MG: 100 CAPSULE ORAL at 20:17

## 2021-01-28 RX ADMIN — VENLAFAXINE HYDROCHLORIDE 300 MG: 150 CAPSULE, EXTENDED RELEASE ORAL at 08:38

## 2021-01-28 RX ADMIN — OXYCODONE HYDROCHLORIDE 5 MG: 5 TABLET ORAL at 10:23

## 2021-01-28 RX ADMIN — CEFTRIAXONE 1 G: 1 INJECTION, POWDER, FOR SOLUTION INTRAMUSCULAR; INTRAVENOUS at 14:30

## 2021-01-28 RX ADMIN — LAMOTRIGINE 25 MG: 25 TABLET ORAL at 08:38

## 2021-01-28 RX ADMIN — ACETAMINOPHEN 1000 MG: 500 TABLET, FILM COATED ORAL at 20:17

## 2021-01-28 RX ADMIN — LEVOTHYROXINE SODIUM 150 MCG: 150 TABLET ORAL at 06:25

## 2021-01-28 RX ADMIN — MEMANTINE 10 MG: 5 TABLET ORAL at 08:38

## 2021-01-28 RX ADMIN — MULTIPLE VITAMINS W/ MINERALS TAB 1 TABLET: TAB at 08:42

## 2021-01-28 RX ADMIN — Medication 500 MG: at 08:38

## 2021-01-28 RX ADMIN — LIOTHYRONINE SODIUM 25 MCG: 25 TABLET ORAL at 06:24

## 2021-01-28 RX ADMIN — PROPAFENONE HYDROCHLORIDE 150 MG: 150 TABLET, FILM COATED ORAL at 14:31

## 2021-01-28 RX ADMIN — ACETAMINOPHEN 1000 MG: 500 TABLET, FILM COATED ORAL at 14:30

## 2021-01-28 RX ADMIN — SIMVASTATIN 40 MG: 40 TABLET, FILM COATED ORAL at 21:50

## 2021-01-28 RX ADMIN — PROPAFENONE HYDROCHLORIDE 150 MG: 150 TABLET, FILM COATED ORAL at 06:24

## 2021-01-28 RX ADMIN — DONEPEZIL HYDROCHLORIDE 20 MG: 10 TABLET ORAL at 21:50

## 2021-01-28 RX ADMIN — GUANFACINE 1 MG: 1 TABLET ORAL at 21:50

## 2021-01-28 RX ADMIN — MEMANTINE 10 MG: 5 TABLET ORAL at 20:17

## 2021-01-28 RX ADMIN — PREGABALIN 100 MG: 100 CAPSULE ORAL at 14:31

## 2021-01-28 RX ADMIN — FOLIC ACID 5 MG: 1 TABLET ORAL at 08:43

## 2021-01-28 RX ADMIN — PROPAFENONE HYDROCHLORIDE 150 MG: 150 TABLET, FILM COATED ORAL at 21:50

## 2021-01-28 RX ADMIN — PREGABALIN 100 MG: 100 CAPSULE ORAL at 08:43

## 2021-01-28 RX ADMIN — ACETAMINOPHEN 1000 MG: 500 TABLET, FILM COATED ORAL at 08:38

## 2021-01-28 RX ADMIN — HEPARIN SODIUM 1100 UNITS/HR: 10000 INJECTION, SOLUTION INTRAVENOUS at 10:22

## 2021-01-28 RX ADMIN — ZINC SULFATE 220 MG (50 MG) CAPSULE 220 MG: CAPSULE at 08:43

## 2021-01-28 ASSESSMENT — ACTIVITIES OF DAILY LIVING (ADL)
ADLS_ACUITY_SCORE: 24
ADLS_ACUITY_SCORE: 24
ADLS_ACUITY_SCORE: 26
ADLS_ACUITY_SCORE: 24

## 2021-01-28 NOTE — PLAN OF CARE
Pt has a heparin drip infusing at 1100 units per protocol no changes needed . Pts has a wound vac which was changed today.  Pts wound vac on continuous at 75. No output this shift.  Pt has a scot drain. Pt has been elevating his leg boot has been on. Pt was up in chair this shift. Pain is controlled with 5mg oxy.

## 2021-01-28 NOTE — PLAN OF CARE
Heparin at 1100 units per hour per protocol.  Recheck Heparin leval at 2300.  Drain or wound vac not putting anything out.  Vac running at 75.   Pain controlled with oxycodone.

## 2021-01-28 NOTE — CONSULTS
CLINICAL NUTRITION SERVICES - REASSESSMENT NOTE      MALNUTRITION: (1/28)  % Weight Loss:  None noted  % Intake:  No decreased intake noted  Subcutaneous Fat Loss:  Upper arm region mild to moderate depletion --> not using as indicator with only 1 region present   Muscle Loss:  None observed (?masked by adiposity)  Fluid Retention:  Mild, LEs --> potential to slightly mask true wt trending     Malnutrition Diagnosis: Patient does not meet two of the above criteria necessary for diagnosing malnutrition       EVALUATION OF PROGRESS TOWARD GOALS   Diet: Regular, Boost shakes BID between meals    Intake/Tolerance:  100% intakes except for day of procedure as outlined below, and is ordering meals TID.  He reports consistently consuming 100% of his supplements and confirms he is receiving 2 per day.  Ordering at times 0-1 protein sources with meals, though based on average of intakes in combination with supplements, is meeting ~90% of high protein needs orally.  Patient is forgetful.  Good appetite.      1/26: hematoma evacuation/LLE debridement w/ wound VAC placement.  Likely to have skin grafting this admit.   - Plans for TCU at discharge.    ASSESSED NUTRITION NEEDS PER APPROVED PRACTICE GUIDELINES:     Dosing Weight 140 kg   Estimated Energy Needs: >/=20 Kcal/Kg  Justification: minimum maintenance, wound healing  Estimated Protein Needs: >/=1 g pro/Kg  Justification: preservation of lean body mass, anticipated post-op/wound healing  Estimated Fluid Needs: per MD      NEW FINDINGS:   - Medications reviewed including:     acetaminophen  1,000 mg Oral TID     cefTRIAXone  1 g Intravenous Q24H     donepezil  20 mg Oral At Bedtime     folic acid  5 mg Oral Daily     guanFACINE  1 mg Oral At Bedtime     lamoTRIgine  25 mg Oral Daily     levothyroxine  150 mcg Oral Daily     liothyronine  25 mcg Oral Daily     memantine  10 mg Oral BID     multivitamin w/minerals  1 tablet Oral Daily     pregabalin  100 mg Oral TID      propafenone  150 mg Oral Q8H     silver sulfADIAZINE   Topical BID     simvastatin  40 mg Oral At Bedtime     sodium chloride (PF)  10 mL Intracatheter Q7 Days     sodium chloride (PF)  3 mL Intracatheter Q8H     venlafaxine  300 mg Oral Daily     vitamin C  500 mg Oral Daily     zinc sulfate  220 mg Oral Daily        heparin 1,100 Units/hr (01/28/21 0117)     - MEDICATION INSTRUCTIONS -       Warfarin Therapy Reminder        - Labs reviewed including:  Electrolytes  Potassium (mmol/L)   Date Value   01/28/2021 4.3   01/27/2021 4.3   01/26/2021 4.6     Phosphorus (mg/dL)   Date Value   08/14/2010 2.6    Blood Glucose  Glucose (mg/dL)   Date Value   01/28/2021 97   01/27/2021 114 (H)   01/26/2021 88   01/25/2021 82   01/24/2021 102 (H)     Hemoglobin A1C (%)   Date Value   01/23/2021 4.9   01/13/2012 5.6   07/29/2011 5.6   06/09/2010 5.0@   10/23/2009 5.4    Inflammatory Markers  CRP Inflammation (mg/L)   Date Value   11/27/2020 2.9   04/18/2013 13.4 (H)   07/02/2010 44.6 (H)     WBC (10e9/L)   Date Value   01/28/2021 8.7   01/27/2021 7.7   01/25/2021 6.7     Albumin (g/dL)   Date Value   01/22/2021 3.2 (L)   01/09/2021 3.4   11/27/2020 3.6      Magnesium (mg/dL)   Date Value   01/22/2021 2.0   08/14/2010 2.1     Sodium (mmol/L)   Date Value   01/28/2021 140   01/27/2021 138   01/26/2021 141    Renal  Urea Nitrogen (mg/dL)   Date Value   01/28/2021 17   01/27/2021 14   01/26/2021 15     Creatinine (mg/dL)   Date Value   01/28/2021 1.19   01/27/2021 1.07   01/26/2021 1.22     Additional  Triglycerides (mg/dL)   Date Value   01/23/2021 130   12/11/2020 192 (H)   10/29/2019 174 (H)     Ketones Urine (mg/dL)   Date Value   01/22/2021 Negative        -  Weight trending reviewed.  Only 2 wts for comparison during admit are quite different, most recent may be impacted by fluid as currently with mild, LE edema:  Vitals:    01/22/21 1442 01/25/21 1729   Weight: 140.3 kg (309 lb 3.2 oz) 147.9 kg (326 lb 1.6 oz)     - Stooling  patterns reviewed.      Previous Goals:   Patient to consume at least 75% of meals TID + 1 supplement daily.  Evaluation: Met    2 protein sources with each meal TID  Evaluation: Not met though overall meeting ~90% protein needs orally as above    Previous Nutrition Diagnosis:   Increased nutrient needs (protein) related to wound healing as evidenced by protein needs as outlined, plans for wound VAC and debridement.  Evaluation: No change      CURRENT NUTRITION DIAGNOSIS  Increased nutrient needs (protein) related to wound healing as evidenced by protein needs as outlined, s/p wound VAC placement and debridement..    INTERVENTIONS  Recommendations / Nutrition Prescription  Continue diet and supplements as ordered.  He denied gelatein offering, see protein education below.    Continue daily MVI/M   Continue 500 mg ascorbic acid daily x 10 days (wound healing, request of plastics)  Continue 220 mg zinc sulfate daily x 10 days (wound healing, request of plastics)    Implementation  Nutrition Education: Discussed protein from food.  Encouraged ongoing supplement use.    Collaboration and Referral of Nutrition care: Discussed POC with team during rounds.     Goals  Pt to consume at least 75% of meals TID + 2 supplements daily.        MONITORING AND EVALUATION:  Progress towards goals will be monitored and evaluated per protocol and Practice Guidelines      Nolvia Pizarro RDN, LD  Clinical Dietitian  3rd floor/ICU: [phone]  All other floors: [phone]  Weekend/holiday: [phone]

## 2021-01-28 NOTE — PLAN OF CARE
"Pt presented with weakness, confusion, hypotension; diagnosed with CVA, infected hematoma to LLE. Pt underwent surgery yesterday 1/26, leg wrap intact, leg wedge in place. Has a wound vac and a JOHN drain; 15 ml out of JOHN. Oxycodone given for LLE pain. Pt is disoriented to time, forgetful. /57   Pulse 75   Temp 97.4  F (36.3  C) (Temporal)   Resp 16   Ht 1.854 m (6' 1\")   Wt 147.9 kg (326 lb 1.6 oz)   SpO2 95%   BMI 43.02 kg/m   On RA. Seizure pads in place. PICC infusing with heparin at 1100 units/hr, next hepXa at 2300. Neuros intact. Will continue with poc.  "

## 2021-01-28 NOTE — PROGRESS NOTES
[facility]  Hospitalist Progress Note  Carolina Small MD 01/28/21   Text Page  Pager: [phone] (7am-6pm)    Reason for Stay (Diagnosis): AMS, weakness, hypotension         Assessment and Plan:      Summary of Stay: Todd S Aschoff is a 65 year old male with past medical history of aortic dissection status post mechanical aortic valve plus graft (1992), atrial fibrillation (chronic anticoagulation with Coumadin), chronic pain, dementia and depression who was hospitalized 1/9-1/13 here for left leg wound with drainage (initially caused by a fall approximately 1 month prior) which was found to be infected hematoma requiring I&D (1/10) and ultimately discharged home on oral antibiotics who was admitted on 1/22/2021 with generalized weakness, confusion and hypotension.  Patient was found to have acute CVA.  Also likely had a seizure prior to admission.      Patient underwent surgery with plastics on 1/26 with removal of 1400 mL of hematoma (no active bleeding) and placement of wound vac.  Wound improving so wound vac changed at the bedside on 1/28. Will need [facility] wound vac changes, Dr. Plascencia planning to see the patient on 2/3 to see if the wound is ready for skin graft.  This would be done this admission if at all possible.  For that reason will continue to hold Coumadin and use heparin drip.    Problem List/Assessment and Plan:     Acute right frontal infarct, suspect this is a silent infarct as he has no neurological deficits: Patient did undergo CT of the head which showed age indeterminate right occipital infarct.  MRI of the brain showed an acute infarct in the right frontal vertex and multiple chronic infarcts in the right occipital lobe and bilateral cerebellar hemispheres.  MRA shows possible diffuse stenosis of the left PCA.  TTE showed no change from prior.  He has no neurological deficits.  -Stroke neurology consulted, appreciate recommendations  -Continue simvastatin 40 mg daily  -Continue  Coumadin as PTA  -Follow-up with PCP in 1 to 2 weeks, follow-up with neurology in clinic in 8 weeks    Suspected seizure: Patient's wife describes seizure-like activity prior to the events that led to his hospitalization.  He had a right arm shaking and unresponsiveness.  He does have an abnormal MRI as above which certainly could serve as a seizure focus.  Neurology recommended starting lamotrigine. No further seizure activity.  -Seizure precautions  -General neurology consulted, appreciate recommendations  -Started lamotrigine 25 mg daily on 1/24  -Need to follow-up with general neurology in 2 weeks (at that time lamotrigine can be increased to 50 mg daily and EEG can be done) - depending on how long patient is hospitalized may need to touch base with Neurology prior to discharge to see if the Lamotrigine needs to be increased before his visit  -No driving, climbing, tub baths or potential dangerous activity in the next 3 months    Left lower extremity infected hematoma status post I&D on 1/10, nonhealing with associated cellulitis s/p Large hematoma evaluation and placement of wound vac on 1/26.  S/p fall in December 2020.  During his last admission was found to have infected hematoma.  Underwent I&D with Ortho on 1/10.  His wound culture grew E. coli and Enterococcus faecalis.  ID did follow throughout that admission and felt that the pathogen was E. coli.  He was treated with IV ceftriaxone and at time of discharge cefuroxime for an additional 7 days.  He has had dehiscence of the lower wound with surrounding necrosis.  He underwent I&D with evacuation of 1400 mL hematoma (no active bleeding) on 1/26 with placement of wound vac.  Wound improving after this procedure per Dr. Plascencia.  -Orthopedics consulted, appreciate recommendations  -Plastic surgery consulted, appreciate recommendations  -Wound vac change at the bedside today, then schedule with be changing on MW  -Dr. Plascencia to evaluate the wound on 2/3 to  determine if skin graft can be performed  -Currently on heparin drip, continue to hold Coumadin as he will go back to OR eventually for screening grafting  -Continue ceftriaxone for 7 days, today is day 6/7 (end date in place)  -Wound nurse consulted, appreciate recommendations    Acute toxic/metabolic encephalopathy - Resolved: Presented with diffuse weakness and significant confusion.  He had significant hypotension.  With hydration his mental status did improve.  I suspect his confusion was multifactorial due to likely seizure, hypotension, acute kidney injury.  See above and below for further treatments.    Acute kidney injury.  Baseline creatinine approximately 1.  On admission creatinine was elevated at 1.58, peaked 1.66.  He is urinating.  He was quite hypotensive on admission which could have lead to ATN causing his renal insufficiency.  UA with 7 RBC, 2 WBC with proteinuria.  FeNA was 2.5.  With IVF his creatinine is improving daily.  Creatinine improved to 1.07 and IV fluids were stopped.  Creatinine today has increased slightly to 1.19.  -No NSAIDs  -BMP in the morning, if creatinine worsening then would restart fluids    Left Shoulder Pain: Patient had pain in the left shoulder (fall/seizure PTA as above).  Orthopedics consulted.  He has no fracture but does have left RC tendinitis with AC joint arthrosis and probable loose bodies.  He underwent left shoulder subacromial injection on 2/25 and feels that this has helped his pain.  -Follow-up with [facility] in outpatient setting for further work-up if persistent pain/decreased mobility    Hypotension - Resolved: Patient had rather profound hypotension on presentation.  This improved significantly with fluids.  Blood pressure now is stable.     Chronic anticoagulation with warfarin for mechanical aortic valve and atrial fibrillation. INR was supratherapeutic at 6.9 (1/23).  INR reversed for surgery with Vitamin K.  He was subsequently started on heparin drip in  the perioperative setting.  His goal INR level is 2.5-3.5.    -Heparin drip to be continued due to future surgery, possibly next week, see above  -Continue to hold Coumadin   -Likely transition to Lovenox for bridging after all of his surgical procedures are complete  -I am discontinuing the pharmacy to dose Coumadin order as he will be off of Coumadin at least through mid next week.  Once Coumadin can be resumed this order needs to be replaced    Acute Anemia: Historically Hgb had been normal, decreased to 12 range during his last hospitalization.  This admission was 12.8 on admission, has decreased to 11.3 but stable over the past few days.  Did have large hematoma evacuated of the LLE on 1/26 as above, no active bleeding.  Iron checked and normal. No indication for transfusion at this time.  -Daily Hgb     Atrial fibrillation.  Resumed the patient on his propafenone.  Anticoagulation plan as above.     Chronic pain.  Patient is on Lyrica.  As needed oxycodone also available in addition to Tylenol.     Hypothyroidism.  Patient is on levothyroxine and liothyronine.     Depression.  Continue Effexor.    Dementia.  Continue prior to admission Namenda and Aricept.    Weakness: PT/OT consulted.  Have recommended TCU which patient and his wife are agreeable to.    Diet: Snacks/Supplements Adult: Boost Shake; Between Meals  Regular Diet Adult    DVT Prophylaxis: Warfarin  Zimmer Catheter: not present  Code Status: Full Code      Disposition Plan   Expected discharge: 7-9 days, recommended to transitional care unit once safe disposition plan/ TCU bed available and skin grafting completed on LLE.  Entered: Carolina Small MD 01/28/2021, 10:04 AM       The patient's care was discussed with the Bedside Nurse, Care Coordinator/, Patient and Patient's Family.    Hospitalist Service            Interval History (Subjective):      Patient was seen with his bedside nurse this morning.   "He states he is feeling well.  Denies chest pain, shortness of breath, cough, nausea or vomiting.  He is eating and drinking well.  Working with therapy.    We discussed that he is no longer going to need to go to the OR tomorrow.  His wound VAC will be changed by the wound nurse at some point today.  This will then change to a Monday Wednesday Friday schedule of changing this.  I also explained that hopefully this will help the wound heal so that he would be able to undergo skin grafting with Dr. Plascencia during this hospital stay.  He understands that ultimately he will go to TCU and is still agreeable to that.    I called his wife by phone and reviewed the care plan with her.  All of her questions were answered.                  Physical Exam:      Last Vital Signs:  /85 (BP Location: Left arm)   Pulse 65   Temp 98.7  F (37.1  C) (Temporal)   Resp 20   Ht 1.854 m (6' 1\")   Wt 147.9 kg (326 lb 1.6 oz)   SpO2 94%   BMI 43.02 kg/m      General: Awake, alert, NAD  HEENT: Normocephalic and atraumatic, eyes anicteric and without scleral injection, EOMI, face symmetric, MMM.  Cardiac: RRR, normal S1, S2. No m/g/r, no LE edema.  Pulmonary: Normal chest rise, normal work of breathing.  Lungs CTAB without crackles or wheezing.  Abdomen: soft, non-tender, non-distended.  Normoactive bowel sounds, no guarding or rebound tenderness.  Extremities: no deformities.  Warm, well perfused.  Skin: no rashes or lesions.  Warm and Dry.  Wound vac in place with ACE wrap from left knee to ankle, drain also present.  Neuro: No focal deficits.  Speech clear.  Coordination and strength grossly normal.  Psych: Alert and oriented x3. Appropriate affect.         Medications:      All current medications were reviewed with changes reflected in problem list.         Data:      All new lab and imaging data was reviewed.   Labs:  Recent Labs   Lab 01/28/21  0628 01/27/21  0559 01/26/21  0623    138 141   POTASSIUM 4.3 4.3 4.6 "   CHLORIDE 106 105 111*   CO2 35* 33* 30   ANIONGAP <1* <1* <1*   GLC 97 114* 88   BUN 17 14 15   CR 1.19 1.07 1.22   GFRESTIMATED 64 73 62   GFRESTBLACK 74 85 72   CATERINA 8.7 8.6 8.6     Recent Labs   Lab 01/28/21  0628 01/27/21  1208 01/27/21  0559 01/25/21  0621   WBC 8.7  --  7.7 6.7   HGB 11.3* 11.3* 11.5* 11.2*   HCT 36.1*  --  35.7* 35.4*     --  98 100     --  234 219     Recent Labs   Lab 01/28/21  0628 01/27/21  0559 01/26/21  0623   INR 1.17* 1.36* 1.62*      Imaging:   No results found for this or any previous visit (from the past 24 hour(s)).    Carolina Small MD

## 2021-01-28 NOTE — PLAN OF CARE
Pt repositioned with 2 assist throughout the night. Denies pain. LS clear but diminished in bases. BS present, but feeling constipated. Discussed bowel management options. CMS intact except for baseline numbness in RLE. No numbness in LLE. Drsg CDI. Brace on. LLE elevated on ramp. Voiding adequate however did have some incontinence. Heparin gtt at 1100 after 2 levels were within therapeutic range. Recheck level in AM. Midline in place. Wound vac in place at 75 continuous suction (50cc). JOHN intact to suction as well (7.5cc).

## 2021-01-28 NOTE — PROGRESS NOTES
[facility] Nurse Inpatient Wound Assessment   Reason for consultation: Evaluate and treat  LLE wounds    Assessment  LLE wounds due to Trauma  Status: initial assessment  Wounds now granular and clean s/p debridement.  1 foam applied in each wound and bridged together.  Treatment Plan  LLE wounds: Changed on 21, next change due 21, then start: Monday/Wednesday/Friday  1. Cleanse with wound cleanser and dry   2. Apply Vaseline gauze over superficial wound between deeper wounds, cover with VAC drape  3. Apply Wound VAC dressing-one foam in each wound  4. Bridge wounds together  5. Settin mmHg continuous   Orders Written  Recommended provider order: None, at this time  WO Nurse follow-up plan:Monday/, starting 21  Nursing to notify the Provider(s) and re-consult the WO Nurse if wound(s) deteriorates or new skin concern.    Patient History  According to provider note(s):  Todd S Aschoff is a 65 year old male who has a past medical history of Alcohol dependence, Allergy, Aortic valve prosthesis, Atrial fibrillation, BPH, Chronic pain,  Dissection of aorta, thoracic  (), Headache, Heart murmur, History of spinal cord injury, Low back pain, Major depression, Mixed hyperlipidemia, Numbness and tingling, OA (osteoarthritis), Obesity, Sciatica, and hypothyroidism among others.  He was recently discharged after incision and drainage of infected hematoma in the left lower extremity, patient has been recovering at home for that.  He presents with confusion, generalized weakness and very low blood pressure.  Evidence of dehydration on physical exam, acute kidney injury and quick response to volume expansion and fluid resuscitation in the emergency department.    Objective Data  Containment of urine/stool: Continent of bladder and Continent of bowel    Active Diet Order  Orders Placed This Encounter      Regular Diet Adult      Output:   I/O last 3 completed shifts:  In:  [P.O.:1640;  I.V.:399]  Out: 1457.5 [Urine:1400; Drains:57.5]    Risk Assessment:   Sensory Perception: 3-->slightly limited  Moisture: 3-->occasionally moist  Activity: 3-->walks occasionally  Mobility: 3-->slightly limited  Nutrition: 3-->adequate  Friction and Shear: 1-->problem  Ascencion Score: 16                          Labs:   Recent Labs   Lab 01/28/21  1329 01/28/21  0628 01/23/21  0747 01/23/21  0747 01/22/21  1105   ALBUMIN  --   --   --   --  3.2*   HGB 11.6* 11.3*   < >  --  12.8*   INR  --  1.17*   < >  --  3.65*   WBC  --  8.7   < >  --  6.5   A1C  --   --   --  4.9  --     < > = values in this interval not displayed.       Physical Exam  Areas of skin assessed: focused LLE    Wound Location:  LLE  Date of last photo 1/28/21  Wound History: Patient injured leg after falling in December.  Required I&D on 1/10/21 for an infected hematoma.  Patient has new debridement scheduled for 1/26/21.      Wound Base: Superior wound is 6x2.5x1cm, 100% granulation tissue and subcutaneous tissue.  Middle wound is 2x1.5cm, 100% agranular.  Distal wound is 7x3.2x1cm, 100% granulation tissue and subcutaneous tissue     Palpation of the wound bed: normal      Drainage: moderate     Description of drainage: serosanguinous     Measurements (length x width x depth, in cm) see above     Tunneling N/A     Undermining N/A  Periwound skin: see above      Color: pink      Temperature: normal   Odor: none  Pain: moderate, sharp    Interventions  Visual inspection and assessment completed   Wound Care Rationale Promote moist wound healing without tissue dehydration  and Gently fill dead space to prevent abscess formation   Wound Care: done per plan of care  Supplies: gathered and placed at the bedside  Current off-loading measures: Pillows  Current support surface: Standard  Atmos Air mattress  Education provided to: plan of care  Discussed plan of care with Patient and Nurse    Torin Hand RN CWOCN

## 2021-01-29 ENCOUNTER — APPOINTMENT (OUTPATIENT)
Dept: OCCUPATIONAL THERAPY | Facility: CLINIC | Age: 66
End: 2021-01-29
Payer: COMMERCIAL

## 2021-01-29 LAB
ANION GAP SERPL CALCULATED.3IONS-SCNC: <1 MMOL/L (ref 3–14)
BUN SERPL-MCNC: 21 MG/DL (ref 7–30)
CALCIUM SERPL-MCNC: 8.7 MG/DL (ref 8.5–10.1)
CHLORIDE SERPL-SCNC: 106 MMOL/L (ref 94–109)
CO2 SERPL-SCNC: 37 MMOL/L (ref 20–32)
CREAT SERPL-MCNC: 1.19 MG/DL (ref 0.66–1.25)
ERYTHROCYTE [DISTWIDTH] IN BLOOD BY AUTOMATED COUNT: 14.2 % (ref 10–15)
GFR SERPL CREATININE-BSD FRML MDRD: 64 ML/MIN/{1.73_M2}
GLUCOSE SERPL-MCNC: 89 MG/DL (ref 70–99)
HCT VFR BLD AUTO: 37.1 % (ref 40–53)
HGB BLD-MCNC: 11.4 G/DL (ref 13.3–17.7)
MCH RBC QN AUTO: 31.1 PG (ref 26.5–33)
MCHC RBC AUTO-ENTMCNC: 30.7 G/DL (ref 31.5–36.5)
MCV RBC AUTO: 101 FL (ref 78–100)
PLATELET # BLD AUTO: 259 10E9/L (ref 150–450)
POTASSIUM SERPL-SCNC: 4 MMOL/L (ref 3.4–5.3)
RBC # BLD AUTO: 3.67 10E12/L (ref 4.4–5.9)
SODIUM SERPL-SCNC: 140 MMOL/L (ref 133–144)
UFH PPP CHRO-ACNC: 0.2 IU/ML
UFH PPP CHRO-ACNC: 0.47 IU/ML
UFH PPP CHRO-ACNC: 0.48 IU/ML
WBC # BLD AUTO: 9.2 10E9/L (ref 4–11)

## 2021-01-29 PROCEDURE — 99233 SBSQ HOSP IP/OBS HIGH 50: CPT | Performed by: INTERNAL MEDICINE

## 2021-01-29 PROCEDURE — 250N000011 HC RX IP 250 OP 636: Performed by: INTERNAL MEDICINE

## 2021-01-29 PROCEDURE — 85520 HEPARIN ASSAY: CPT | Performed by: HOSPITALIST

## 2021-01-29 PROCEDURE — 250N000013 HC RX MED GY IP 250 OP 250 PS 637: Performed by: STUDENT IN AN ORGANIZED HEALTH CARE EDUCATION/TRAINING PROGRAM

## 2021-01-29 PROCEDURE — 85520 HEPARIN ASSAY: CPT | Performed by: INTERNAL MEDICINE

## 2021-01-29 PROCEDURE — 250N000013 HC RX MED GY IP 250 OP 250 PS 637: Performed by: INTERNAL MEDICINE

## 2021-01-29 PROCEDURE — 80048 BASIC METABOLIC PNL TOTAL CA: CPT | Performed by: HOSPITALIST

## 2021-01-29 PROCEDURE — 120N000001 HC R&B MED SURG/OB

## 2021-01-29 PROCEDURE — 97530 THERAPEUTIC ACTIVITIES: CPT | Mod: GO | Performed by: REHABILITATION PRACTITIONER

## 2021-01-29 PROCEDURE — 85027 COMPLETE CBC AUTOMATED: CPT | Performed by: HOSPITALIST

## 2021-01-29 PROCEDURE — 36415 COLL VENOUS BLD VENIPUNCTURE: CPT | Performed by: INTERNAL MEDICINE

## 2021-01-29 RX ORDER — DOCUSATE SODIUM 100 MG/1
100 CAPSULE, LIQUID FILLED ORAL 2 TIMES DAILY
Status: DISCONTINUED | OUTPATIENT
Start: 2021-01-29 | End: 2021-02-08 | Stop reason: HOSPADM

## 2021-01-29 RX ADMIN — PREGABALIN 100 MG: 100 CAPSULE ORAL at 15:08

## 2021-01-29 RX ADMIN — HEPARIN SODIUM 1250 UNITS/HR: 10000 INJECTION, SOLUTION INTRAVENOUS at 07:40

## 2021-01-29 RX ADMIN — LIOTHYRONINE SODIUM 25 MCG: 25 TABLET ORAL at 07:56

## 2021-01-29 RX ADMIN — DONEPEZIL HYDROCHLORIDE 20 MG: 10 TABLET ORAL at 21:26

## 2021-01-29 RX ADMIN — ACETAMINOPHEN 1000 MG: 500 TABLET, FILM COATED ORAL at 15:07

## 2021-01-29 RX ADMIN — FOLIC ACID 5 MG: 1 TABLET ORAL at 09:18

## 2021-01-29 RX ADMIN — PREGABALIN 100 MG: 100 CAPSULE ORAL at 09:17

## 2021-01-29 RX ADMIN — ACETAMINOPHEN 1000 MG: 500 TABLET, FILM COATED ORAL at 21:27

## 2021-01-29 RX ADMIN — PREGABALIN 100 MG: 100 CAPSULE ORAL at 21:27

## 2021-01-29 RX ADMIN — GUANFACINE 1 MG: 1 TABLET ORAL at 21:40

## 2021-01-29 RX ADMIN — DOCUSATE SODIUM 100 MG: 100 CAPSULE, LIQUID FILLED ORAL at 21:26

## 2021-01-29 RX ADMIN — SIMVASTATIN 40 MG: 40 TABLET, FILM COATED ORAL at 21:27

## 2021-01-29 RX ADMIN — PROPAFENONE HYDROCHLORIDE 150 MG: 150 TABLET, FILM COATED ORAL at 21:27

## 2021-01-29 RX ADMIN — MULTIPLE VITAMINS W/ MINERALS TAB 1 TABLET: TAB at 09:18

## 2021-01-29 RX ADMIN — MEMANTINE 10 MG: 5 TABLET ORAL at 21:27

## 2021-01-29 RX ADMIN — ACETAMINOPHEN 1000 MG: 500 TABLET, FILM COATED ORAL at 09:17

## 2021-01-29 RX ADMIN — LAMOTRIGINE 25 MG: 25 TABLET ORAL at 09:23

## 2021-01-29 RX ADMIN — PROPAFENONE HYDROCHLORIDE 150 MG: 150 TABLET, FILM COATED ORAL at 15:08

## 2021-01-29 RX ADMIN — Medication 500 MG: at 09:19

## 2021-01-29 RX ADMIN — DOCUSATE SODIUM 50 MG AND SENNOSIDES 8.6 MG 1 TABLET: 8.6; 5 TABLET, FILM COATED ORAL at 15:08

## 2021-01-29 RX ADMIN — PROPAFENONE HYDROCHLORIDE 150 MG: 150 TABLET, FILM COATED ORAL at 06:44

## 2021-01-29 RX ADMIN — CEFTRIAXONE 1 G: 1 INJECTION, POWDER, FOR SOLUTION INTRAMUSCULAR; INTRAVENOUS at 14:59

## 2021-01-29 RX ADMIN — MEMANTINE 10 MG: 5 TABLET ORAL at 09:21

## 2021-01-29 RX ADMIN — VENLAFAXINE HYDROCHLORIDE 300 MG: 150 CAPSULE, EXTENDED RELEASE ORAL at 09:22

## 2021-01-29 RX ADMIN — LEVOTHYROXINE SODIUM 150 MCG: 150 TABLET ORAL at 07:56

## 2021-01-29 RX ADMIN — ZINC SULFATE 220 MG (50 MG) CAPSULE 220 MG: CAPSULE at 09:20

## 2021-01-29 ASSESSMENT — ACTIVITIES OF DAILY LIVING (ADL)
ADLS_ACUITY_SCORE: 20
ADLS_ACUITY_SCORE: 24

## 2021-01-29 NOTE — PROGRESS NOTES
[facility]  Hospitalist Progress Note  Maurice Patton MD 01/29/2021      Reason for Stay (Diagnosis): AMS, weakness, hypotension         Assessment and Plan:      Summary of Stay: Todd S Aschoff is a 65 year old male with past medical history of aortic dissection status post mechanical aortic valve plus graft (1992), atrial fibrillation (chronic anticoagulation with Coumadin), chronic pain, dementia and depression who was hospitalized 1/9-1/13 here for left leg wound with drainage (initially caused by a fall approximately 1 month prior) which was found to be infected hematoma requiring I&D (1/10) and ultimately discharged home on oral antibiotics who was admitted on 1/22/2021 with generalized weakness, confusion and hypotension.  Patient was found to have acute CVA.  Also likely had a seizure prior to admission.      Patient underwent surgery with plastics on 1/26 with removal of 1400 mL of hematoma (no active bleeding) and placement of wound vac.  Wound improving so wound vac changed at the bedside on 1/28. Will need [facility] wound vac changes, Dr. Plascencia planning to see the patient on 2/3 to see if the wound is ready for skin graft.  This would be done this admission if at all possible.  For that reason will continue to hold Coumadin and use heparin drip.    Problem List/Assessment and Plan:     Acute right frontal infarct, suspect this is a silent infarct as he has no neurological deficits: Patient did undergo CT of the head which showed age indeterminate right occipital infarct.  MRI of the brain showed an acute infarct in the right frontal vertex and multiple chronic infarcts in the right occipital lobe and bilateral cerebellar hemispheres.  MRA shows possible diffuse stenosis of the left PCA.  TTE showed no change from prior.  He has no neurological deficits.  -Stroke neurology consulted, appreciate recommendations  -Continue simvastatin 40 mg daily  -Continue Coumadin as PTA  -Follow-up  with PCP in 1 to 2 weeks, follow-up with neurology in clinic in 8 weeks    Suspected seizure: Patient's wife describes seizure-like activity prior to the events that led to his hospitalization.  He had a right arm shaking and unresponsiveness.  He does have an abnormal MRI as above which certainly could serve as a seizure focus.  Neurology recommended starting lamotrigine. No further seizure activity.  -Seizure precautions  -General neurology consulted, appreciate recommendations  -Started lamotrigine 25 mg daily on 1/24  -Need to follow-up with general neurology in 2 weeks (at that time lamotrigine can be increased to 50 mg daily and EEG can be done) - depending on how long patient is hospitalized may need to touch base with Neurology prior to discharge to see if the Lamotrigine needs to be increased before his visit  -No driving, climbing, tub baths or potential dangerous activity in the next 3 months    Left lower extremity infected hematoma status post I&D on 1/10, nonhealing with associated cellulitis s/p Large hematoma evaluation and placement of wound vac on 1/26.  S/p fall in December 2020.  During his last admission was found to have infected hematoma.  Underwent I&D with Ortho on 1/10.  His wound culture grew E. coli and Enterococcus faecalis.  ID did follow throughout that admission and felt that the pathogen was E. coli.  He was treated with IV ceftriaxone and at time of discharge cefuroxime for an additional 7 days.  He has had dehiscence of the lower wound with surrounding necrosis.  He underwent I&D with evacuation of 1400 mL hematoma (no active bleeding) on 1/26 with placement of wound vac.  Wound improving after this procedure per Dr. Plascencia.  -Orthopedics consulted, appreciate recommendations  -Plastic surgery consulted, appreciate recommendations  -Wound vac change at the bedside today, then schedule with be changing on MW  -Dr. Plascencia to evaluate the wound on 2/3 to determine if skin graft can  be performed  -Currently on heparin drip, continue to hold Coumadin as he will go back to OR eventually for screening grafting  -Completed ceftriaxone 7 days on 1/29/21.  -Wound nurse consulted, appreciate recommendations    Acute toxic/metabolic encephalopathy - Resolved: Presented with diffuse weakness and significant confusion.  He had significant hypotension.  With hydration his mental status did improve.  I suspect his confusion was multifactorial due to likely seizure, hypotension, acute kidney injury.  See above and below for further treatments.    Acute kidney injury.  Baseline creatinine approximately 1.  On admission creatinine was elevated at 1.58, peaked 1.66.  He is urinating.  He was quite hypotensive on admission which could have lead to ATN causing his renal insufficiency.  UA with 7 RBC, 2 WBC with proteinuria.  FeNA was 2.5.  With IVF his creatinine is improving daily.  Creatinine improved to 1.07 and IV fluids were stopped.  Creatinine today has increased slightly to 1.19.  -No NSAIDs  -BMP in the morning, if creatinine worsening then would restart fluids    Left Shoulder Pain: Patient had pain in the left shoulder (fall/seizure PTA as above).  Orthopedics consulted.  He has no fracture but does have left RC tendinitis with AC joint arthrosis and probable loose bodies.  He underwent left shoulder subacromial injection on 2/25 and feels that this has helped his pain.  -Follow-up with [facility] in outpatient setting for further work-up if persistent pain/decreased mobility    Hypotension - Resolved: Patient had rather profound hypotension on presentation.  This improved significantly with fluids.  Blood pressure now is stable.     Chronic anticoagulation with warfarin for mechanical aortic valve and atrial fibrillation. INR was supratherapeutic at 6.9 (1/23).  INR reversed for surgery with Vitamin K.  He was subsequently started on heparin drip in the perioperative setting.  His goal INR level is 2.5-3.5.     -Heparin drip to be continued due to future surgery, possibly next week, see above  -Continue to hold Coumadin   -Likely transition to Lovenox for bridging after all of his surgical procedures are complete  -I am discontinuing the pharmacy to dose Coumadin order as he will be off of Coumadin at least through mid next week.  Once Coumadin can be resumed this order needs to be replaced    Acute Anemia: Historically Hgb had been normal, decreased to 12 range during his last hospitalization.  This admission was 12.8 on admission, has decreased to 11.3 but stable over the past few days.  Did have large hematoma evacuated of the LLE on 1/26 as above, no active bleeding.  Iron checked and normal. No indication for transfusion at this time.  -Daily Hgb     Atrial fibrillation.  Resumed the patient on his propafenone.  Anticoagulation plan as above.     Chronic pain.  Patient is on Lyrica.  As needed oxycodone also available in addition to Tylenol.     Hypothyroidism.  Patient is on levothyroxine and liothyronine.     Depression.  Continue Effexor.    Dementia.  Continue prior to admission Namenda and Aricept.    Weakness: PT/OT consulted.  Have recommended TCU which patient and his wife are agreeable to.    Diet: Snacks/Supplements Adult: Boost Shake; Between Meals  Regular Diet Adult    DVT Prophylaxis: Warfarin  Zimmer Catheter: not present  Code Status: Full Code      Disposition Plan   Expected discharge: > 7 days, recommended to transitional care unit once safe disposition plan/ TCU bed available and skin grafting completed on LLE.  Entered: Maurice Patton MD 01/29/2021, 8:11 AM       The patient's care was discussed with the Bedside Nurse and Patient.    Hospitalist Service  [facility]          Interval History (Subjective):      I assumed care today  Feeling okay overall, constipated and adding bowel regimen  Completing ceftriaxone today  Appears lucid                  Physical  "Exam:      Last Vital Signs:  /82 (BP Location: Left arm)   Pulse 62   Temp 97.9  F (36.6  C) (Temporal)   Resp 16   Ht 1.854 m (6' 1\")   Wt 147.9 kg (326 lb 1.6 oz)   SpO2 97%   BMI 43.02 kg/m      General: Awake, alert, NAD  HEENT: Normocephalic and atraumatic, eyes anicteric and without scleral injection, EOMI, face symmetric, MMM.  Cardiac: RRR, normal S1, S2. No m/g/r, no LE edema.  Pulmonary: Normal chest rise, normal work of breathing.  Lungs CTAB without crackles or wheezing.  Abdomen: soft, non-tender, non-distended.  Normoactive bowel sounds, no guarding or rebound tenderness.  Extremities: no deformities.  Warm, well perfused.  Skin: no rashes or lesions.  Warm and Dry.  Wound vac in place with ACE wrap from left knee to ankle, drain also present.  Neuro: No focal deficits.  Speech clear.  Coordination and strength grossly normal.  Psych: Alert and oriented x3. Appropriate affect.         Medications:      All current medications were reviewed with changes reflected in problem list.         Data:      All new lab and imaging data was reviewed.   Labs:  Recent Labs   Lab 01/29/21  0640 01/28/21  0628 01/27/21  0559    140 138   POTASSIUM 4.0 4.3 4.3   CHLORIDE 106 106 105   CO2 37* 35* 33*   ANIONGAP <1* <1* <1*   GLC 89 97 114*   BUN 21 17 14   CR 1.19 1.19 1.07   GFRESTIMATED 64 64 73   GFRESTBLACK 74 74 85   CATERINA 8.7 8.7 8.6     Recent Labs   Lab 01/29/21  0640 01/28/21  1329 01/28/21  0628 01/27/21  0559 01/27/21  0559   WBC 9.2  --  8.7  --  7.7   HGB 11.4* 11.6* 11.3*   < > 11.5*   HCT 37.1*  --  36.1*  --  35.7*   *  --  100  --  98     --  239  --  234    < > = values in this interval not displayed.     Recent Labs   Lab 01/28/21  0628 01/27/21  0559 01/26/21  0623   INR 1.17* 1.36* 1.62*      Imaging:   No results found for this or any previous visit (from the past 24 hour(s)).    Maurice Patton MD         "

## 2021-01-29 NOTE — PLAN OF CARE
Noc RN- Pt resting, sleepy most of the shift. Pt's pain controlled with tylenol. Wound Vac intact, Heparin qtt continues at 1100units/hour

## 2021-01-29 NOTE — PLAN OF CARE
"/72 (BP Location: Left arm)   Pulse 75   Temp 98.8  F (37.1  C)   Resp 18   Ht 1.854 m (6' 1\")   Wt 147.9 kg (326 lb 1.6 oz)   SpO2 94%   BMI 43.02 kg/m    Orientation: A&Ox4, forgetful  Resp: LS Clear, RA  Skin:Wound changed at bedside 1/28 next due at 2/1  Pain: Managed with Tylenol and Oxycodone  CMS: baseline Numbness in RLE  Dressing: CDI  Activity: A2 walker, gb  Drains: JOHN 10mL output  Diet: Regular  Voiding: using urinal at bedside  Will continue to monitor     "

## 2021-01-29 NOTE — PLAN OF CARE
DAY SHIFT  Pt alert and orientated but did sleep a lot. Pt reports that he has been more lethargic during the last few days. Pt states he feels it is not the lyrica causing him to be more tired. Pt states he takes the lyrica at home and he is wondering if the new seizure med could be contributing. Pt up to the BR and assistance of 2 just to help with tubes. Pt refused lunch because he ate a big meal. Pt wondering if he can drive for 3 months since his seizure. Pts wife called and requested an update. With pts permission this staff updated pts wife. Plan is TCU on discharge. Heparin rate changed this am and next lab draw at shift change required no new changes. Next lab is 2215 this evening.

## 2021-01-30 ENCOUNTER — APPOINTMENT (OUTPATIENT)
Dept: OCCUPATIONAL THERAPY | Facility: CLINIC | Age: 66
End: 2021-01-30
Payer: COMMERCIAL

## 2021-01-30 ENCOUNTER — APPOINTMENT (OUTPATIENT)
Dept: PHYSICAL THERAPY | Facility: CLINIC | Age: 66
End: 2021-01-30
Payer: COMMERCIAL

## 2021-01-30 LAB
UFH PPP CHRO-ACNC: 0.21 IU/ML
UFH PPP CHRO-ACNC: 0.31 IU/ML
UFH PPP CHRO-ACNC: 0.52 IU/ML

## 2021-01-30 PROCEDURE — 999N000040 HC STATISTIC CONSULT NO CHARGE VASC ACCESS

## 2021-01-30 PROCEDURE — 97530 THERAPEUTIC ACTIVITIES: CPT | Mod: GP | Performed by: PHYSICAL THERAPIST

## 2021-01-30 PROCEDURE — 85520 HEPARIN ASSAY: CPT | Performed by: INTERNAL MEDICINE

## 2021-01-30 PROCEDURE — 36415 COLL VENOUS BLD VENIPUNCTURE: CPT | Performed by: INTERNAL MEDICINE

## 2021-01-30 PROCEDURE — 97535 SELF CARE MNGMENT TRAINING: CPT | Mod: GO

## 2021-01-30 PROCEDURE — 120N000001 HC R&B MED SURG/OB

## 2021-01-30 PROCEDURE — 250N000013 HC RX MED GY IP 250 OP 250 PS 637: Performed by: STUDENT IN AN ORGANIZED HEALTH CARE EDUCATION/TRAINING PROGRAM

## 2021-01-30 PROCEDURE — 97116 GAIT TRAINING THERAPY: CPT | Mod: GP | Performed by: PHYSICAL THERAPIST

## 2021-01-30 PROCEDURE — 250N000011 HC RX IP 250 OP 636: Performed by: INTERNAL MEDICINE

## 2021-01-30 PROCEDURE — 99233 SBSQ HOSP IP/OBS HIGH 50: CPT | Performed by: INTERNAL MEDICINE

## 2021-01-30 PROCEDURE — 85520 HEPARIN ASSAY: CPT | Performed by: HOSPITALIST

## 2021-01-30 PROCEDURE — 250N000013 HC RX MED GY IP 250 OP 250 PS 637: Performed by: INTERNAL MEDICINE

## 2021-01-30 PROCEDURE — 36415 COLL VENOUS BLD VENIPUNCTURE: CPT | Performed by: HOSPITALIST

## 2021-01-30 RX ADMIN — SIMVASTATIN 40 MG: 40 TABLET, FILM COATED ORAL at 22:02

## 2021-01-30 RX ADMIN — PROPAFENONE HYDROCHLORIDE 150 MG: 150 TABLET, FILM COATED ORAL at 13:37

## 2021-01-30 RX ADMIN — FOLIC ACID 5 MG: 1 TABLET ORAL at 09:11

## 2021-01-30 RX ADMIN — SILVER SULFADIAZINE: 10 CREAM TOPICAL at 22:03

## 2021-01-30 RX ADMIN — HEPARIN SODIUM 1250 UNITS/HR: 10000 INJECTION, SOLUTION INTRAVENOUS at 00:24

## 2021-01-30 RX ADMIN — HEPARIN SODIUM 1200 UNITS/HR: 10000 INJECTION, SOLUTION INTRAVENOUS at 22:48

## 2021-01-30 RX ADMIN — LIOTHYRONINE SODIUM 25 MCG: 25 TABLET ORAL at 06:38

## 2021-01-30 RX ADMIN — PREGABALIN 100 MG: 100 CAPSULE ORAL at 13:37

## 2021-01-30 RX ADMIN — LAMOTRIGINE 25 MG: 25 TABLET ORAL at 09:12

## 2021-01-30 RX ADMIN — Medication 500 MG: at 09:14

## 2021-01-30 RX ADMIN — LEVOTHYROXINE SODIUM 150 MCG: 150 TABLET ORAL at 06:38

## 2021-01-30 RX ADMIN — DOCUSATE SODIUM 50 MG AND SENNOSIDES 8.6 MG 1 TABLET: 8.6; 5 TABLET, FILM COATED ORAL at 09:10

## 2021-01-30 RX ADMIN — PREGABALIN 100 MG: 100 CAPSULE ORAL at 09:12

## 2021-01-30 RX ADMIN — ZINC SULFATE 220 MG (50 MG) CAPSULE 220 MG: CAPSULE at 09:12

## 2021-01-30 RX ADMIN — MEMANTINE 10 MG: 5 TABLET ORAL at 09:13

## 2021-01-30 RX ADMIN — PROPAFENONE HYDROCHLORIDE 150 MG: 150 TABLET, FILM COATED ORAL at 06:38

## 2021-01-30 RX ADMIN — GUANFACINE 1 MG: 1 TABLET ORAL at 22:02

## 2021-01-30 RX ADMIN — MEMANTINE 10 MG: 5 TABLET ORAL at 19:54

## 2021-01-30 RX ADMIN — ACETAMINOPHEN 1000 MG: 500 TABLET, FILM COATED ORAL at 09:04

## 2021-01-30 RX ADMIN — PROPAFENONE HYDROCHLORIDE 150 MG: 150 TABLET, FILM COATED ORAL at 22:02

## 2021-01-30 RX ADMIN — DONEPEZIL HYDROCHLORIDE 20 MG: 10 TABLET ORAL at 22:01

## 2021-01-30 RX ADMIN — MULTIPLE VITAMINS W/ MINERALS TAB 1 TABLET: TAB at 09:10

## 2021-01-30 RX ADMIN — ACETAMINOPHEN 1000 MG: 500 TABLET, FILM COATED ORAL at 13:37

## 2021-01-30 RX ADMIN — PREGABALIN 100 MG: 100 CAPSULE ORAL at 19:54

## 2021-01-30 RX ADMIN — DOCUSATE SODIUM 100 MG: 100 CAPSULE, LIQUID FILLED ORAL at 09:10

## 2021-01-30 RX ADMIN — ACETAMINOPHEN 1000 MG: 500 TABLET, FILM COATED ORAL at 19:54

## 2021-01-30 RX ADMIN — DOCUSATE SODIUM 100 MG: 100 CAPSULE, LIQUID FILLED ORAL at 19:54

## 2021-01-30 RX ADMIN — VENLAFAXINE HYDROCHLORIDE 300 MG: 150 CAPSULE, EXTENDED RELEASE ORAL at 09:13

## 2021-01-30 ASSESSMENT — ACTIVITIES OF DAILY LIVING (ADL)
ADLS_ACUITY_SCORE: 19
ADLS_ACUITY_SCORE: 20
ADLS_ACUITY_SCORE: 19
ADLS_ACUITY_SCORE: 20
ADLS_ACUITY_SCORE: 17
ADLS_ACUITY_SCORE: 20

## 2021-01-30 NOTE — PLAN OF CARE
Noc RN- Pt resting this shift, c/o some pain to left leg denies need for pain rx. Vital signs stable

## 2021-01-30 NOTE — PLAN OF CARE
Pt A&O x4. VSS. A1 with walker and GB. Had another bm this evening. Voiding adequately. Regular diet. Midline in place. Heparin gtt @ 12.5 ml/hr. Xa level checked and within normal range, another Xa check scheduled for tomorrow AM per protocol. Denies pain. LLE elevated. Ace wrap applied and boot on to prevent ankle from bending. Wound vac patent. JOHN drain to suction, stripped per orders, only 5cc out.Continues on IV rocephin. Possible skin graft on 2/3. Will continue to monitor.

## 2021-01-30 NOTE — PROGRESS NOTES
[facility]  Hospitalist Progress Note  Maurice Patton MD 01/30/2021      Reason for Stay (Diagnosis): AMS, weakness, hypotension         Assessment and Plan:      Summary of Stay: Todd S Aschoff is a 65 year old male with past medical history of aortic dissection status post mechanical aortic valve plus graft (1992), atrial fibrillation (chronic anticoagulation with Coumadin), chronic pain, dementia and depression who was hospitalized 1/9-1/13 here for left leg wound with drainage (initially caused by a fall approximately 1 month prior) which was found to be infected hematoma requiring I&D (1/10) and ultimately discharged home on oral antibiotics who was admitted on 1/22/2021 with generalized weakness, confusion and hypotension.  Patient was found to have acute CVA.  Also likely had a seizure prior to admission.      Patient underwent surgery with plastics on 1/26 with removal of 1400 mL of hematoma (no active bleeding) and placement of wound vac.  Wound improving so wound vac changed at the bedside on 1/28. Will need [facility] wound vac changes, Dr. Plascencia planning to see the patient on 2/3 to see if the wound is ready for skin graft.  This would be done this admission if at all possible.  For that reason will continue to hold Coumadin and use heparin drip.    Problem List/Assessment and Plan:     Acute right frontal infarct, suspect this is a silent infarct as he has no neurological deficits: Patient did undergo CT of the head which showed age indeterminate right occipital infarct.  MRI of the brain showed an acute infarct in the right frontal vertex and multiple chronic infarcts in the right occipital lobe and bilateral cerebellar hemispheres.  MRA shows possible diffuse stenosis of the left PCA.  TTE showed no change from prior.  He has no neurological deficits.  -Stroke neurology consulted, appreciate recommendations  -Continue simvastatin 40 mg daily  -Continue Coumadin as PTA  -Follow-up  with PCP in 1 to 2 weeks, follow-up with neurology in clinic in 8 weeks    Suspected seizure: Patient's wife describes seizure-like activity prior to the events that led to his hospitalization.  He had a right arm shaking and unresponsiveness.  He does have an abnormal MRI as above which certainly could serve as a seizure focus.  Neurology recommended starting lamotrigine. No further seizure activity.  -Seizure precautions  -General neurology consulted, appreciate recommendations  -Started lamotrigine 25 mg daily on 1/24  -Need to follow-up with general neurology in 2 weeks (at that time lamotrigine can be increased to 50 mg daily and EEG can be done) - depending on how long patient is hospitalized may need to touch base with Neurology prior to discharge to see if the Lamotrigine needs to be increased before his visit  -No driving, climbing, tub baths or potential dangerous activity in the next 3 months    Left lower extremity infected hematoma status post I&D on 1/10, nonhealing with associated cellulitis s/p Large hematoma evaluation and placement of wound vac on 1/26.  S/p fall in December 2020.  During his last admission was found to have infected hematoma.  Underwent I&D with Ortho on 1/10.  His wound culture grew E. coli and Enterococcus faecalis.  ID did follow throughout that admission and felt that the pathogen was E. coli.  He was treated with IV ceftriaxone and at time of discharge cefuroxime for an additional 7 days.  He has had dehiscence of the lower wound with surrounding necrosis.  He underwent I&D with evacuation of 1400 mL hematoma (no active bleeding) on 1/26 with placement of wound vac.  Wound improving after this procedure per Dr. Plascencia.  -Orthopedics consulted, appreciate recommendations  -Plastic surgery consulted, appreciate recommendations  -Wound vac change at the bedside today, then schedule with be changing on MW  -Dr. Plascencia to evaluate the wound on 2/3 to determine if skin graft can  be performed  -Currently on heparin drip, continue to hold Coumadin as he will go back to OR eventually for screening grafting  -Completed ceftriaxone 7 days on 1/29/21.  -Wound nurse consulted, appreciate recommendations    Acute toxic/metabolic encephalopathy - Resolved: Presented with diffuse weakness and significant confusion.  He had significant hypotension.  With hydration his mental status did improve.  I suspect his confusion was multifactorial due to likely seizure, hypotension, acute kidney injury.  See above and below for further treatments.    Acute kidney injury.  Baseline creatinine approximately 1.  On admission creatinine was elevated at 1.58, peaked 1.66.  He is urinating.  He was quite hypotensive on admission which could have lead to ATN causing his renal insufficiency.  UA with 7 RBC, 2 WBC with proteinuria.  FeNA was 2.5.  With IVF his creatinine is improving daily.  Creatinine improved to 1.07 and IV fluids were stopped.  Creatinine today has increased slightly to 1.19.  -No NSAIDs  -BMP in the morning, if creatinine worsening then would restart fluids    Left Shoulder Pain: Patient had pain in the left shoulder (fall/seizure PTA as above).  Orthopedics consulted.  He has no fracture but does have left RC tendinitis with AC joint arthrosis and probable loose bodies.  He underwent left shoulder subacromial injection on 2/25 and feels that this has helped his pain.  -Follow-up with [facility] in outpatient setting for further work-up if persistent pain/decreased mobility    Hypotension - Resolved: Patient had rather profound hypotension on presentation.  This improved significantly with fluids.  Blood pressure now is stable.     Chronic anticoagulation with warfarin for mechanical aortic valve and atrial fibrillation. INR was supratherapeutic at 6.9 (1/23).  INR reversed for surgery with Vitamin K.  He was subsequently started on heparin drip in the perioperative setting.  His goal INR level is 2.5-3.5.     -Heparin drip to be continued due to future surgery, possibly next week, see above  -Continue to hold Coumadin   -Likely transition to Lovenox for bridging after all of his surgical procedures are complete  -I am discontinuing the pharmacy to dose Coumadin order as he will be off of Coumadin at least through mid next week.  Once Coumadin can be resumed this order needs to be replaced    Acute Anemia: Historically Hgb had been normal, decreased to 12 range during his last hospitalization.  This admission was 12.8 on admission, has decreased to 11.3 but stable over the past few days.  Did have large hematoma evacuated of the LLE on 1/26 as above, no active bleeding.  Iron checked and normal. No indication for transfusion at this time.  -Daily Hgb     Atrial fibrillation.  Resumed the patient on his propafenone.  Anticoagulation plan as above.     Chronic pain.  Patient is on Lyrica.  As needed oxycodone also available in addition to Tylenol.     Hypothyroidism.  Patient is on levothyroxine and liothyronine.     Depression.  Continue Effexor.    Dementia.  Continue prior to admission Namenda and Aricept.    Weakness: PT/OT consulted.  Have recommended TCU which patient and his wife are agreeable to.    Diet: Snacks/Supplements Adult: Boost Shake; Between Meals  Regular Diet Adult    DVT Prophylaxis: Warfarin  Zimmer Catheter: not present  Code Status: Full Code      Disposition Plan   Expected discharge: > 7 days, recommended to transitional care unit once safe disposition plan/ TCU bed available and skin grafting completed on LLE.  Entered: Maurice Patton MD 01/30/2021, 8:38 AM       The patient's care was discussed with the Bedside Nurse and Patient.    Hospitalist Service  [facility]          Interval History (Subjective):      Feels good  Having bms now  Continuing wound vac  Mental status clear  No cardiopulmonary complaints                  Physical Exam:      Last Vital  "Signs:  /83 (BP Location: Left arm)   Pulse 68   Temp 98  F (36.7  C) (Temporal)   Resp 12   Ht 1.854 m (6' 1\")   Wt 147.9 kg (326 lb 1.6 oz)   SpO2 94%   BMI 43.02 kg/m      General: Awake, alert, NAD  HEENT: Normocephalic and atraumatic, eyes anicteric and without scleral injection, EOMI, face symmetric, MMM.  Cardiac: RRR, normal S1, S2. No m/g/r, no LE edema.  Pulmonary: Normal chest rise, normal work of breathing.  Lungs CTAB without crackles or wheezing.  Abdomen: soft, non-tender, non-distended.  Normoactive bowel sounds, no guarding or rebound tenderness.  Extremities: no deformities.  Warm, well perfused.  Skin: no rashes or lesions.  Warm and Dry.  Wound vac in place with ACE wrap from left knee to ankle, drain also present.  Neuro: No focal deficits.  Speech clear.  Coordination and strength grossly normal.  Psych: Alert and oriented x3. Appropriate affect.         Medications:      All current medications were reviewed with changes reflected in problem list.         Data:      All new lab and imaging data was reviewed.   Labs:  Recent Labs   Lab 01/29/21  0640 01/28/21  0628 01/27/21  0559    140 138   POTASSIUM 4.0 4.3 4.3   CHLORIDE 106 106 105   CO2 37* 35* 33*   ANIONGAP <1* <1* <1*   GLC 89 97 114*   BUN 21 17 14   CR 1.19 1.19 1.07   GFRESTIMATED 64 64 73   GFRESTBLACK 74 74 85   CATERINA 8.7 8.7 8.6     Recent Labs   Lab 01/29/21  0640 01/28/21  1329 01/28/21  0628 01/27/21  0559 01/27/21  0559   WBC 9.2  --  8.7  --  7.7   HGB 11.4* 11.6* 11.3*   < > 11.5*   HCT 37.1*  --  36.1*  --  35.7*   *  --  100  --  98     --  239  --  234    < > = values in this interval not displayed.     Recent Labs   Lab 01/28/21  0628 01/27/21  0559 01/26/21  0623   INR 1.17* 1.36* 1.62*      Imaging:   No results found for this or any previous visit (from the past 24 hour(s)).    Maurice Patton MD         "

## 2021-01-31 ENCOUNTER — APPOINTMENT (OUTPATIENT)
Dept: OCCUPATIONAL THERAPY | Facility: CLINIC | Age: 66
End: 2021-01-31
Payer: COMMERCIAL

## 2021-01-31 LAB
UFH PPP CHRO-ACNC: 0.36 IU/ML
UFH PPP CHRO-ACNC: 0.5 IU/ML

## 2021-01-31 PROCEDURE — 250N000013 HC RX MED GY IP 250 OP 250 PS 637: Performed by: STUDENT IN AN ORGANIZED HEALTH CARE EDUCATION/TRAINING PROGRAM

## 2021-01-31 PROCEDURE — 97535 SELF CARE MNGMENT TRAINING: CPT | Mod: GO

## 2021-01-31 PROCEDURE — 36415 COLL VENOUS BLD VENIPUNCTURE: CPT | Performed by: HOSPITALIST

## 2021-01-31 PROCEDURE — 250N000011 HC RX IP 250 OP 636: Performed by: INTERNAL MEDICINE

## 2021-01-31 PROCEDURE — 99232 SBSQ HOSP IP/OBS MODERATE 35: CPT | Performed by: INTERNAL MEDICINE

## 2021-01-31 PROCEDURE — 120N000001 HC R&B MED SURG/OB

## 2021-01-31 PROCEDURE — 250N000013 HC RX MED GY IP 250 OP 250 PS 637: Performed by: INTERNAL MEDICINE

## 2021-01-31 PROCEDURE — 85520 HEPARIN ASSAY: CPT | Performed by: HOSPITALIST

## 2021-01-31 RX ORDER — POLYETHYLENE GLYCOL 3350 17 G/17G
17 POWDER, FOR SOLUTION ORAL DAILY
Status: DISCONTINUED | OUTPATIENT
Start: 2021-01-31 | End: 2021-02-08 | Stop reason: HOSPADM

## 2021-01-31 RX ADMIN — MEMANTINE 10 MG: 5 TABLET ORAL at 20:20

## 2021-01-31 RX ADMIN — SIMVASTATIN 40 MG: 40 TABLET, FILM COATED ORAL at 22:18

## 2021-01-31 RX ADMIN — PROPAFENONE HYDROCHLORIDE 150 MG: 150 TABLET, FILM COATED ORAL at 22:18

## 2021-01-31 RX ADMIN — PREGABALIN 100 MG: 100 CAPSULE ORAL at 16:29

## 2021-01-31 RX ADMIN — VENLAFAXINE HYDROCHLORIDE 300 MG: 150 CAPSULE, EXTENDED RELEASE ORAL at 10:03

## 2021-01-31 RX ADMIN — DOCUSATE SODIUM 100 MG: 100 CAPSULE, LIQUID FILLED ORAL at 20:20

## 2021-01-31 RX ADMIN — ACETAMINOPHEN 1000 MG: 500 TABLET, FILM COATED ORAL at 14:49

## 2021-01-31 RX ADMIN — MULTIPLE VITAMINS W/ MINERALS TAB 1 TABLET: TAB at 10:04

## 2021-01-31 RX ADMIN — ZINC SULFATE 220 MG (50 MG) CAPSULE 220 MG: CAPSULE at 09:59

## 2021-01-31 RX ADMIN — HEPARIN SODIUM 1200 UNITS/HR: 10000 INJECTION, SOLUTION INTRAVENOUS at 05:13

## 2021-01-31 RX ADMIN — GUANFACINE 1 MG: 1 TABLET ORAL at 22:18

## 2021-01-31 RX ADMIN — LEVOTHYROXINE SODIUM 150 MCG: 150 TABLET ORAL at 10:00

## 2021-01-31 RX ADMIN — ACETAMINOPHEN 1000 MG: 500 TABLET, FILM COATED ORAL at 09:59

## 2021-01-31 RX ADMIN — DOCUSATE SODIUM 100 MG: 100 CAPSULE, LIQUID FILLED ORAL at 10:04

## 2021-01-31 RX ADMIN — FOLIC ACID 5 MG: 1 TABLET ORAL at 10:01

## 2021-01-31 RX ADMIN — Medication 500 MG: at 10:01

## 2021-01-31 RX ADMIN — HEPARIN SODIUM 1200 UNITS/HR: 10000 INJECTION, SOLUTION INTRAVENOUS at 18:06

## 2021-01-31 RX ADMIN — PROPAFENONE HYDROCHLORIDE 150 MG: 150 TABLET, FILM COATED ORAL at 14:49

## 2021-01-31 RX ADMIN — POLYETHYLENE GLYCOL 3350 17 G: 17 POWDER, FOR SOLUTION ORAL at 11:00

## 2021-01-31 RX ADMIN — DONEPEZIL HYDROCHLORIDE 20 MG: 10 TABLET ORAL at 22:19

## 2021-01-31 RX ADMIN — LAMOTRIGINE 25 MG: 25 TABLET ORAL at 10:36

## 2021-01-31 RX ADMIN — PREGABALIN 100 MG: 100 CAPSULE ORAL at 10:35

## 2021-01-31 RX ADMIN — MEMANTINE 10 MG: 5 TABLET ORAL at 10:03

## 2021-01-31 RX ADMIN — PROPAFENONE HYDROCHLORIDE 150 MG: 150 TABLET, FILM COATED ORAL at 06:30

## 2021-01-31 RX ADMIN — PREGABALIN 100 MG: 100 CAPSULE ORAL at 20:20

## 2021-01-31 RX ADMIN — LIOTHYRONINE SODIUM 25 MCG: 25 TABLET ORAL at 10:02

## 2021-01-31 RX ADMIN — ACETAMINOPHEN 1000 MG: 500 TABLET, FILM COATED ORAL at 20:20

## 2021-01-31 ASSESSMENT — ACTIVITIES OF DAILY LIVING (ADL)
ADLS_ACUITY_SCORE: 17

## 2021-01-31 NOTE — PROGRESS NOTES
[facility]  Hospitalist Progress Note  Maurice Patton MD 01/31/2021      Reason for Stay (Diagnosis): AMS, weakness, hypotension         Assessment and Plan:      Summary of Stay: Todd S Aschoff is a 65 year old male with past medical history of aortic dissection status post mechanical aortic valve plus graft (1992), atrial fibrillation (chronic anticoagulation with Coumadin), chronic pain, dementia and depression who was hospitalized 1/9-1/13 here for left leg wound with drainage (initially caused by a fall approximately 1 month prior) which was found to be infected hematoma requiring I&D (1/10) and ultimately discharged home on oral antibiotics who was admitted on 1/22/2021 with generalized weakness, confusion and hypotension.  Patient was found to have acute CVA.  Also likely had a seizure prior to admission.      Patient underwent surgery with plastics on 1/26 with removal of 1400 mL of hematoma (no active bleeding) and placement of wound vac.  Wound improving so wound vac changed at the bedside on 1/28. Will need [facility] wound vac changes, Dr. Plascencia planning to see the patient on 2/3 to see if the wound is ready for skin graft.  This would be done this admission if at all possible.  For that reason will continue to hold Coumadin and use heparin drip.    Problem List/Assessment and Plan:     Acute right frontal infarct, suspect this is a silent infarct as he has no neurological deficits: Patient did undergo CT of the head which showed age indeterminate right occipital infarct.  MRI of the brain showed an acute infarct in the right frontal vertex and multiple chronic infarcts in the right occipital lobe and bilateral cerebellar hemispheres.  MRA shows possible diffuse stenosis of the left PCA.  TTE showed no change from prior.  He has no neurological deficits.  -Stroke neurology consulted, appreciate recommendations  -Continue simvastatin 40 mg daily  -Continue Coumadin as PTA  -Follow-up  with PCP in 1 to 2 weeks, follow-up with neurology in clinic in 8 weeks    Suspected seizure: Patient's wife describes seizure-like activity prior to the events that led to his hospitalization.  He had a right arm shaking and unresponsiveness.  He does have an abnormal MRI as above which certainly could serve as a seizure focus.  Neurology recommended starting lamotrigine. No further seizure activity.  -Seizure precautions  -General neurology consulted, appreciate recommendations  -Started lamotrigine 25 mg daily on 1/24  -Need to follow-up with general neurology in 2 weeks (at that time lamotrigine can be increased to 50 mg daily and EEG can be done) - depending on how long patient is hospitalized may need to touch base with Neurology prior to discharge to see if the Lamotrigine needs to be increased before his visit  -No driving, climbing, tub baths or potential dangerous activity in the next 3 months    Left lower extremity infected hematoma status post I&D on 1/10, nonhealing with associated cellulitis s/p Large hematoma evaluation and placement of wound vac on 1/26.  S/p fall in December 2020.  During his last admission was found to have infected hematoma.  Underwent I&D with Ortho on 1/10.  His wound culture grew E. coli and Enterococcus faecalis.  ID did follow throughout that admission and felt that the pathogen was E. coli.  He was treated with IV ceftriaxone and at time of discharge cefuroxime for an additional 7 days.  He has had dehiscence of the lower wound with surrounding necrosis.  He underwent I&D with evacuation of 1400 mL hematoma (no active bleeding) on 1/26 with placement of wound vac.  Wound improving after this procedure per Dr. Plascencia.  -Orthopedics consulted, appreciate recommendations  -Plastic surgery consulted, appreciate recommendations  -Wound vac change MWF  -Dr. Plascencia to evaluate the wound on 2/3 to determine if skin graft can be performed  -Currently on heparin drip, continue to  hold Coumadin as he will go back to OR eventually for screening grafting  -Completed ceftriaxone 7 days on 1/29/21.  -Wound nurse consulted, appreciate recommendations    Acute toxic/metabolic encephalopathy - Resolved: Presented with diffuse weakness and significant confusion.  He had significant hypotension.  With hydration his mental status did improve.  I suspect his confusion was multifactorial due to likely seizure, hypotension, acute kidney injury.      Acute kidney injury.  Improved.  Baseline creatinine approximately 1.  On admission creatinine was elevated at 1.58, peaked 1.66.  He is urinating.  He was quite hypotensive on admission which could have lead to ATN causing his renal insufficiency.   -No NSAIDs  -BMP in the morning     Left Shoulder Pain: Patient had pain in the left shoulder (fall/seizure PTA as above).  Orthopedics consulted.  He has no fracture but does have left RC tendinitis with AC joint arthrosis and probable loose bodies.  He underwent left shoulder subacromial injection on 2/25 and feels that this has helped his pain.  -Follow-up with [facility] in outpatient setting for further work-up if persistent pain/decreased mobility    Hypotension - Resolved: Patient had rather profound hypotension on presentation.  This improved significantly with fluids.  Blood pressure now is stable.     Chronic anticoagulation with warfarin for mechanical aortic valve and atrial fibrillation. INR was supratherapeutic at 6.9 (1/23).  INR reversed for surgery with Vitamin K.  He was subsequently started on heparin drip in the perioperative setting.  His goal INR level is 2.5-3.5.    -Heparin drip to be continued due to future surgery, possibly next week, see above  -Continue to hold Coumadin   -Likely transition to Lovenox for bridging after all of his surgical procedures are complete  -have discontinued the pharmacy to dose Coumadin order as he will be off of Coumadin at least through mid next week.  Once Coumadin  "can be resumed this order needs to be replaced    Acute Anemia: Historically Hgb had been normal, decreased to 12 range during his last hospitalization.  This admission was 12.8 on admission, has decreased to 11.3 but stable over the past few days.  Did have large hematoma evacuated of the LLE on 1/26 as above, no active bleeding.  Iron checked and normal. No indication for transfusion at this time.  -Daily Hgb     Atrial fibrillation.  Resumed the patient on his propafenone.  Anticoagulation plan as above.     Chronic pain.  Patient is on Lyrica.  As needed oxycodone also available in addition to Tylenol.     Hypothyroidism.  Patient is on levothyroxine and liothyronine.     Depression.  Continue Effexor.    Dementia.  Continue prior to admission Namenda and Aricept.    Weakness: PT/OT consulted.  Have recommended TCU which patient and his wife are agreeable to.    Diet: Snacks/Supplements Adult: Boost Shake; Between Meals  Regular Diet Adult    DVT Prophylaxis: Warfarin  Zimmer Catheter: not present  Code Status: Full Code      Disposition Plan   Expected discharge: 4-6 days, recommended to transitional care unit once safe disposition plan/ TCU bed available and skin grafting completed on LLE.  Entered: Maurice Patton MD 01/31/2021, 8:02 AM       The patient's care was discussed with the Bedside Nurse and Patient.    Hospitalist Service  [facility]          Interval History (Subjective):        Continuing wound vac  Some soreness at the wound vac site, brace not too tight, nor is ace wrap.  No new issues, denies cardiopulmonary complaints  Bowels feeling OK though slightly constipated.  scheduling miralax  Assist of 1 w/ walker                   Physical Exam:      Last Vital Signs:  /76 (BP Location: Left arm)   Pulse 65   Temp 97.8  F (36.6  C) (Temporal)   Resp 16   Ht 1.854 m (6' 1\")   Wt 147.9 kg (326 lb 1.6 oz)   SpO2 97%   BMI 43.02 kg/m      General: Awake, " alert, NAD  HEENT: Normocephalic and atraumatic, eyes anicteric and without scleral injection, EOMI, face symmetric, MMM.  Cardiac: RRR, normal S1, S2. No m/g/r, no LE edema.  Pulmonary: Normal chest rise, normal work of breathing.  Lungs CTAB without crackles or wheezing.  Abdomen: soft, non-tender, non-distended.  Normoactive bowel sounds, no guarding or rebound tenderness.  Extremities: no deformities.  Warm, well perfused.  Skin: no rashes or lesions.  Warm and Dry.  Wound vac in place with ACE wrap from left knee to ankle, drain also present.  Neuro: No focal deficits.  Speech clear.  Coordination and strength grossly normal.  Psych: Alert and oriented x3. Appropriate affect.         Medications:      All current medications were reviewed with changes reflected in problem list.         Data:      All new lab and imaging data was reviewed.   Labs:  Recent Labs   Lab 01/29/21  0640 01/28/21  0628 01/27/21  0559    140 138   POTASSIUM 4.0 4.3 4.3   CHLORIDE 106 106 105   CO2 37* 35* 33*   ANIONGAP <1* <1* <1*   GLC 89 97 114*   BUN 21 17 14   CR 1.19 1.19 1.07   GFRESTIMATED 64 64 73   GFRESTBLACK 74 74 85   CATERINA 8.7 8.7 8.6     Recent Labs   Lab 01/29/21  0640 01/28/21  1329 01/28/21  0628 01/27/21  0559 01/27/21  0559   WBC 9.2  --  8.7  --  7.7   HGB 11.4* 11.6* 11.3*   < > 11.5*   HCT 37.1*  --  36.1*  --  35.7*   *  --  100  --  98     --  239  --  234    < > = values in this interval not displayed.     Recent Labs   Lab 01/28/21  0628 01/27/21  0559 01/26/21  0623   INR 1.17* 1.36* 1.62*      Imaging:   No results found for this or any previous visit (from the past 24 hour(s)).    Maurice Patton MD

## 2021-01-31 NOTE — PROGRESS NOTES
Todd S Aschoff is a 65 year old male patient POD# 3 s/p LLE washout with VAC placement on 1/16/2021. At this time there was nearly 1500 ml of hematoma. No purulence or signs of deep space infection at that time.    Today (1/29) patient appears well. He had a VAC change on 1/28 which he tolerated well, did not have a rebleed and remains well without surounding erythema. The JOHN drain does have more sanguinous output than I would prefer so will continue for now.   1. Generalized muscle weakness    2. Hypotension, unspecified hypotension type    3. Polypharmacy    4. Anemia, unspecified type    5. Renal insufficiency      Past Medical History:   Diagnosis Date     Alcohol dependence (H)      Allergy, unspecified not elsewhere classified     seasonal     Antiplatelet or antithrombotic long-term use     coumadin/lovenox     Aortic valve prosthesis present      Atrial fibrillation (H)      Blood transfusion     after heart surg 1992     BPH (benign prostatic hypertrophy)      Chronic infection     low back wound incision not healing      Chronic pain     lower back and right leg and left leg     Coagulation disorder (H)     on blood thinners     Dissection of aorta, thoracic (H) 1992    [facility] aotic valve + arch graft 1992     Headache(784.0)      Heart murmur     aortic valve replaced and arch     History of spinal cord injury      Low back pain      Major depression      Mixed hyperlipidemia      Numbness and tingling     right leg post surg rightt hip/ also left leg since surg     OA (osteoarthritis)     hips     Obesity, unspecified      Prostate infection      Sciatica 2002    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     Allergies   Allergen Reactions     Gabapentin      Severe behavioral disturbances     Active Problems:    Polypharmacy    Renal insufficiency    Generalized muscle weakness    Hypotension, unspecified hypotension type    " Anemia, unspecified type    Blood pressure 131/64, pulse 80, temperature 97.3  F (36.3  C), temperature source Temporal, resp. rate 16, height 1.854 m (6' 1\"), weight 147.9 kg (326 lb 1.6 oz), SpO2 93 %.    Subjective  Objective     Admission on 01/09/2021, Discharged on 01/13/2021   Component Date Value Ref Range Status     WBC 01/09/2021 6.0  4.0 - 11.0 10e9/L Final     RBC Count 01/09/2021 4.83  4.4 - 5.9 10e12/L Final     Hemoglobin 01/09/2021 15.6  13.3 - 17.7 g/dL Final     Hematocrit 01/09/2021 47.3  40.0 - 53.0 % Final     MCV 01/09/2021 98  78 - 100 fl Final     MCH 01/09/2021 32.3  26.5 - 33.0 pg Final     MCHC 01/09/2021 33.0  31.5 - 36.5 g/dL Final     RDW 01/09/2021 13.9  10.0 - 15.0 % Final     Platelet Count 01/09/2021 246  150 - 450 10e9/L Final     Diff Method 01/09/2021 Automated Method   Final     % Neutrophils 01/09/2021 64.7  % Final     % Lymphocytes 01/09/2021 23.3  % Final     % Monocytes 01/09/2021 9.0  % Final     % Eosinophils 01/09/2021 2.2  % Final     % Basophils 01/09/2021 0.5  % Final     % Immature Granulocytes 01/09/2021 0.3  % Final     Nucleated RBCs 01/09/2021 0  0 /100 Final     Absolute Neutrophil 01/09/2021 3.9  1.6 - 8.3 10e9/L Final     Absolute Lymphocytes 01/09/2021 1.4  0.8 - 5.3 10e9/L Final     Absolute Monocytes 01/09/2021 0.5  0.0 - 1.3 10e9/L Final     Absolute Eosinophils 01/09/2021 0.1  0.0 - 0.7 10e9/L Final     Absolute Basophils 01/09/2021 0.0  0.0 - 0.2 10e9/L Final     Abs Immature Granulocytes 01/09/2021 0.0  0 - 0.4 10e9/L Final     Absolute Nucleated RBC 01/09/2021 0.0   Final     Sodium 01/09/2021 140  133 - 144 mmol/L Final     Potassium 01/09/2021 4.3  3.4 - 5.3 mmol/L Final     Chloride 01/09/2021 105  94 - 109 mmol/L Final     Carbon Dioxide 01/09/2021 34* 20 - 32 mmol/L Final     Anion Gap 01/09/2021 1* 3 - 14 mmol/L Final     Glucose 01/09/2021 146* 70 - 99 mg/dL Final     Urea Nitrogen 01/09/2021 13  7 - 30 mg/dL Final     Creatinine 01/09/2021 " 1.02  0.66 - 1.25 mg/dL Final     GFR Estimate 01/09/2021 77  >60 mL/min/[1.73_m2] Final    Comment: Non  GFR Calc  Starting 12/18/2018, serum creatinine based estimated GFR (eGFR) will be   calculated using the Chronic Kidney Disease Epidemiology Collaboration   (CKD-EPI) equation.       GFR Estimate If Black 01/09/2021 90  >60 mL/min/[1.73_m2] Final    Comment:  GFR Calc  Starting 12/18/2018, serum creatinine based estimated GFR (eGFR) will be   calculated using the Chronic Kidney Disease Epidemiology Collaboration   (CKD-EPI) equation.       Calcium 01/09/2021 8.7  8.5 - 10.1 mg/dL Final     Bilirubin Total 01/09/2021 1.0  0.2 - 1.3 mg/dL Final     Albumin 01/09/2021 3.4  3.4 - 5.0 g/dL Final     Protein Total 01/09/2021 6.9  6.8 - 8.8 g/dL Final     Alkaline Phosphatase 01/09/2021 96  40 - 150 U/L Final     ALT 01/09/2021 23  0 - 70 U/L Final     AST 01/09/2021 28  0 - 45 U/L Final     INR 01/09/2021 3.26* 0.86 - 1.14 Final     PTT 01/09/2021 46* 22 - 37 sec Final     Specimen Description 01/09/2021 Blood Left Arm   Final     Culture Micro 01/09/2021 No growth   Final     Specimen Description 01/09/2021 Blood Left Hand   Final     Culture Micro 01/09/2021 No growth   Final     Creatinine 01/09/2021 1.0  0.66 - 1.25 mg/dL Final     GFR Estimate 01/09/2021 75  >60 mL/min/[1.73_m2] Final     GFR Estimate If Black 01/09/2021 >90  >60 mL/min/[1.73_m2] Final     SARS-CoV-2 Virus Specimen Source 01/09/2021 Nasopharyngeal   Final     SARS-CoV-2 PCR Result 01/09/2021 NEGATIVE   Final    SARS-CoV2 (COVID-19) RNA not detected, presumed negative.     SARS-CoV-2 PCR Comment 01/09/2021 (Note)   Final    Comment: Testing was performed using the claudine SARS-CoV-2 & Influenza A/B Assay on the   claudine Angie System.  This test should be ordered for the detection of SARS-COV-2 in individuals who   meet SARS-CoV-2 clinical and/or epidemiological criteria. Test performance is   unknown in asymptomatic  patients.  This test is for in vitro diagnostic use under the FDA EUA for laboratories   certified under CLIA to perform moderate and/or high complexity testing. This   test has not been FDA cleared or approved.  A negative test does not rule out the presence of PCR inhibitors in the   specimen or target RNA in concentration below the limit of detection for the   assay. The possibility of a false negative should be considered if the   patient's recent exposure or clinical presentation suggests COVID-19.  [facility] are certified under the Clinical Laboratory   Improvement Amendments of 1988 (CLIA-88) as qualified to perform moderate   and/or high complexity laboratory testing.       INR 01/09/2021 3.27* 0.86 - 1.14 Final     INR 01/09/2021 2.74* 0.86 - 1.14 Final     INR 01/10/2021 2.22* 0.86 - 1.14 Final     WBC 01/10/2021 6.0  4.0 - 11.0 10e9/L Final     RBC Count 01/10/2021 4.61  4.4 - 5.9 10e12/L Final     Hemoglobin 01/10/2021 14.7  13.3 - 17.7 g/dL Final     Hematocrit 01/10/2021 45.7  40.0 - 53.0 % Final     MCV 01/10/2021 99  78 - 100 fl Final     MCH 01/10/2021 31.9  26.5 - 33.0 pg Final     MCHC 01/10/2021 32.2  31.5 - 36.5 g/dL Final     RDW 01/10/2021 14.0  10.0 - 15.0 % Final     Platelet Count 01/10/2021 214  150 - 450 10e9/L Final     Sodium 01/10/2021 138  133 - 144 mmol/L Final     Potassium 01/10/2021 3.8  3.4 - 5.3 mmol/L Final     Chloride 01/10/2021 107  94 - 109 mmol/L Final     Carbon Dioxide 01/10/2021 30  20 - 32 mmol/L Final     Anion Gap 01/10/2021 1* 3 - 14 mmol/L Final     Glucose 01/10/2021 85  70 - 99 mg/dL Final     Urea Nitrogen 01/10/2021 15  7 - 30 mg/dL Final     Creatinine 01/10/2021 0.93  0.66 - 1.25 mg/dL Final     GFR Estimate 01/10/2021 87  >60 mL/min/[1.73_m2] Final    Comment: Non  GFR Calc  Starting 12/18/2018, serum creatinine based estimated GFR (eGFR) will be   calculated using the Chronic Kidney Disease Epidemiology Collaboration    (CKD-EPI) equation.       GFR Estimate If Black 01/10/2021 >90  >60 mL/min/[1.73_m2] Final    Comment:  GFR Calc  Starting 12/18/2018, serum creatinine based estimated GFR (eGFR) will be   calculated using the Chronic Kidney Disease Epidemiology Collaboration   (CKD-EPI) equation.       Calcium 01/10/2021 8.8  8.5 - 10.1 mg/dL Final     INR 01/10/2021 1.70* 0.86 - 1.14 Final     INR 01/10/2021 1.88* 0.86 - 1.14 Final     Specimen Description 01/10/2021 Tissue Left LOWER LEG SPECIMEN 1   Final     Special Requests 01/10/2021 Surgery   Final     Culture Micro 01/10/2021 *  Final                    Value:Light growth  Mixed aerobic and anaerobic esther       Culture Micro 01/10/2021 No predominant anaerobes   Final     Specimen Description 01/10/2021 Tissue Left Leg SPECIMEN 1   Final     Special Requests 01/10/2021    Final                    Value:Surgery  Specimen collected in surgery       Gram Stain 01/10/2021 No organisms seen   Final     Gram Stain 01/10/2021    Final                    Value:Rare  WBC'S seen       Specimen Description 01/10/2021 Left Leg SPECIMEN 1   Final     Special Requests 01/10/2021    Final                    Value:Surgery  Specimen collected in surgery       Culture Micro 01/10/2021 *  Final                    Value:Moderate growth  Escherichia coli       Culture Micro 01/10/2021 *  Final                    Value:Moderate growth  Citrobacter koseri       Culture Micro 01/10/2021 *  Final                    Value:Light growth  Enterococcus faecalis       PTT 01/10/2021 36  22 - 37 sec Final     Heparin Unfractionated Anti Xa Lev* 01/10/2021 <0.10  IU/mL Final    Comment: Therapeutic Range:                UFH: 0.25-0.50 IU/mL for low                     intensity dosing                     0.30-0.70 IU/mL for high                     intensity dosing for                     DVT and PE  This test is not validated for other direct factor X inhibitors (e.g.   rivaroxaban,  apixaban, edoxaban, betrixaban, fondaparinux) and should not be   used for monitoring of other medications.       Heparin Unfractionated Anti Xa Lev* 01/10/2021 0.33  IU/mL Final    Comment: Therapeutic Range:                UFH: 0.25-0.50 IU/mL for low                     intensity dosing                     0.30-0.70 IU/mL for high                     intensity dosing for                     DVT and PE  This test is not validated for other direct factor X inhibitors (e.g.   rivaroxaban, apixaban, edoxaban, betrixaban, fondaparinux) and should not be   used for monitoring of other medications.       Heparin Unfractionated Anti Xa Lev* 01/11/2021 0.43  IU/mL Final    Comment: Therapeutic Range:                UFH: 0.25-0.50 IU/mL for low                     intensity dosing                     0.30-0.70 IU/mL for high                     intensity dosing for                     DVT and PE  This test is not validated for other direct factor X inhibitors (e.g.   rivaroxaban, apixaban, edoxaban, betrixaban, fondaparinux) and should not be   used for monitoring of other medications.       Hemoglobin 01/11/2021 13.6  13.3 - 17.7 g/dL Final     INR 01/11/2021 1.36* 0.86 - 1.14 Final     Glucose 01/11/2021 100* 70 - 99 mg/dL Final     Hemoglobin 01/12/2021 13.8  13.3 - 17.7 g/dL Final     INR 01/12/2021 1.47* 0.86 - 1.14 Final     Sodium 01/12/2021 137  133 - 144 mmol/L Final     Potassium 01/12/2021 4.8  3.4 - 5.3 mmol/L Final     Chloride 01/12/2021 101  94 - 109 mmol/L Final     Carbon Dioxide 01/12/2021 37* 20 - 32 mmol/L Final     Anion Gap 01/12/2021 <1* 3 - 14 mmol/L Final     Glucose 01/12/2021 116* 70 - 99 mg/dL Final     Urea Nitrogen 01/12/2021 10  7 - 30 mg/dL Final     Creatinine 01/12/2021 0.97  0.66 - 1.25 mg/dL Final     GFR Estimate 01/12/2021 82  >60 mL/min/[1.73_m2] Final    Comment: Non  GFR Calc  Starting 12/18/2018, serum creatinine based estimated GFR (eGFR) will be   calculated using  the Chronic Kidney Disease Epidemiology Collaboration   (CKD-EPI) equation.       GFR Estimate If Black 01/12/2021 >90  >60 mL/min/[1.73_m2] Final    Comment:  GFR Calc  Starting 12/18/2018, serum creatinine based estimated GFR (eGFR) will be   calculated using the Chronic Kidney Disease Epidemiology Collaboration   (CKD-EPI) equation.       Calcium 01/12/2021 9.0  8.5 - 10.1 mg/dL Final     INR 01/13/2021 2.01* 0.86 - 1.14 Final     WBC 01/13/2021 6.8  4.0 - 11.0 10e9/L Final     RBC Count 01/13/2021 3.87* 4.4 - 5.9 10e12/L Final     Hemoglobin 01/13/2021 12.3* 13.3 - 17.7 g/dL Final     Hematocrit 01/13/2021 39.4* 40.0 - 53.0 % Final     MCV 01/13/2021 102* 78 - 100 fl Final     MCH 01/13/2021 31.8  26.5 - 33.0 pg Final     MCHC 01/13/2021 31.2* 31.5 - 36.5 g/dL Final     RDW 01/13/2021 14.1  10.0 - 15.0 % Final     Platelet Count 01/13/2021 190  150 - 450 10e9/L Final     VAC intact. Holding suction. No surrounding erythema or leaks. JOHN holding suction. Sanguinous output. Patient comfortable. Moving foot and toes spontaneously. Good gross sensation on exam.      Assessment & Plan    Continue VAC with Nursing changes MWF or similar schedule   Continue JOHN Drains  Continue Ambulation w/ PT/OT for mobility   Will re-evaluate at next VAC change for suitability for STSG vs continued VAC    Appreciate hospitalist team care of Mr. Aschoff Karan Chopra, MD  1/30/2021

## 2021-01-31 NOTE — PLAN OF CARE
DAY SHIFT until 1900  Pt alert and orientated. Pt denies pain to his LLE. States baseline tingling in his RLE since hip and back surgery. Pt up in chair twice this shift. Pt ambulated in the ayala with PT, assist of 1, walker and belt. Pt talked to his wife and did not request staff to update her when offered. Pt aware that plan is wound vac dsg change and possible skin graft 2-3. Pt only ate 2 meals this shift. Pt heparin infusing after stopped for one hour this am and dose increased. Next lab check is 2230.

## 2021-01-31 NOTE — PLAN OF CARE
A&Ox4, VSS: stable. Up with Ax1,gbelt and walker. Denies pain. Xa lab: 20.1, one time Bolus 3000 units of Heparin given. Heparin gtt@ 12 ml/hr. Redraw @0430: Xa lab: within normal range. LLE elevated with pillows. Voiding good amounts. Wound vac in place. JOHN patent and striped, no output this shift. Possible skin graft on LLE.

## 2021-02-01 LAB
ANION GAP SERPL CALCULATED.3IONS-SCNC: 3 MMOL/L (ref 3–14)
BUN SERPL-MCNC: 21 MG/DL (ref 7–30)
CALCIUM SERPL-MCNC: 9 MG/DL (ref 8.5–10.1)
CHLORIDE SERPL-SCNC: 105 MMOL/L (ref 94–109)
CO2 SERPL-SCNC: 31 MMOL/L (ref 20–32)
CREAT SERPL-MCNC: 1.11 MG/DL (ref 0.66–1.25)
ERYTHROCYTE [DISTWIDTH] IN BLOOD BY AUTOMATED COUNT: 14.2 % (ref 10–15)
GFR SERPL CREATININE-BSD FRML MDRD: 70 ML/MIN/{1.73_M2}
GLUCOSE SERPL-MCNC: 90 MG/DL (ref 70–99)
HCT VFR BLD AUTO: 38.5 % (ref 40–53)
HGB BLD-MCNC: 11.9 G/DL (ref 13.3–17.7)
MCH RBC QN AUTO: 31 PG (ref 26.5–33)
MCHC RBC AUTO-ENTMCNC: 30.9 G/DL (ref 31.5–36.5)
MCV RBC AUTO: 100 FL (ref 78–100)
PLATELET # BLD AUTO: 247 10E9/L (ref 150–450)
POTASSIUM SERPL-SCNC: 4.8 MMOL/L (ref 3.4–5.3)
RBC # BLD AUTO: 3.84 10E12/L (ref 4.4–5.9)
SODIUM SERPL-SCNC: 139 MMOL/L (ref 133–144)
UFH PPP CHRO-ACNC: 0.32 IU/ML
WBC # BLD AUTO: 6.8 10E9/L (ref 4–11)

## 2021-02-01 PROCEDURE — 97605 NEG PRS WND THER DME<=50SQCM: CPT

## 2021-02-01 PROCEDURE — 99232 SBSQ HOSP IP/OBS MODERATE 35: CPT | Performed by: INTERNAL MEDICINE

## 2021-02-01 PROCEDURE — 120N000001 HC R&B MED SURG/OB

## 2021-02-01 PROCEDURE — 36415 COLL VENOUS BLD VENIPUNCTURE: CPT | Performed by: INTERNAL MEDICINE

## 2021-02-01 PROCEDURE — 85520 HEPARIN ASSAY: CPT | Performed by: INTERNAL MEDICINE

## 2021-02-01 PROCEDURE — 80048 BASIC METABOLIC PNL TOTAL CA: CPT | Performed by: INTERNAL MEDICINE

## 2021-02-01 PROCEDURE — 250N000011 HC RX IP 250 OP 636: Performed by: INTERNAL MEDICINE

## 2021-02-01 PROCEDURE — 250N000013 HC RX MED GY IP 250 OP 250 PS 637: Performed by: STUDENT IN AN ORGANIZED HEALTH CARE EDUCATION/TRAINING PROGRAM

## 2021-02-01 PROCEDURE — 250N000013 HC RX MED GY IP 250 OP 250 PS 637: Performed by: INTERNAL MEDICINE

## 2021-02-01 PROCEDURE — 85027 COMPLETE CBC AUTOMATED: CPT | Performed by: INTERNAL MEDICINE

## 2021-02-01 RX ADMIN — ZINC SULFATE 220 MG (50 MG) CAPSULE 220 MG: CAPSULE at 09:08

## 2021-02-01 RX ADMIN — PREGABALIN 100 MG: 100 CAPSULE ORAL at 20:57

## 2021-02-01 RX ADMIN — FOLIC ACID 5 MG: 1 TABLET ORAL at 09:08

## 2021-02-01 RX ADMIN — ACETAMINOPHEN 1000 MG: 500 TABLET, FILM COATED ORAL at 14:48

## 2021-02-01 RX ADMIN — DOCUSATE SODIUM 100 MG: 100 CAPSULE, LIQUID FILLED ORAL at 20:57

## 2021-02-01 RX ADMIN — LEVOTHYROXINE SODIUM 150 MCG: 150 TABLET ORAL at 09:10

## 2021-02-01 RX ADMIN — HEPARIN SODIUM 1200 UNITS/HR: 10000 INJECTION, SOLUTION INTRAVENOUS at 16:40

## 2021-02-01 RX ADMIN — MEMANTINE 10 MG: 5 TABLET ORAL at 09:09

## 2021-02-01 RX ADMIN — PREGABALIN 100 MG: 100 CAPSULE ORAL at 14:48

## 2021-02-01 RX ADMIN — DOCUSATE SODIUM 100 MG: 100 CAPSULE, LIQUID FILLED ORAL at 09:08

## 2021-02-01 RX ADMIN — DONEPEZIL HYDROCHLORIDE 20 MG: 10 TABLET ORAL at 22:55

## 2021-02-01 RX ADMIN — PROPAFENONE HYDROCHLORIDE 150 MG: 150 TABLET, FILM COATED ORAL at 22:55

## 2021-02-01 RX ADMIN — PROPAFENONE HYDROCHLORIDE 150 MG: 150 TABLET, FILM COATED ORAL at 06:11

## 2021-02-01 RX ADMIN — Medication 500 MG: at 09:08

## 2021-02-01 RX ADMIN — MULTIPLE VITAMINS W/ MINERALS TAB 1 TABLET: TAB at 09:08

## 2021-02-01 RX ADMIN — PREGABALIN 100 MG: 100 CAPSULE ORAL at 09:08

## 2021-02-01 RX ADMIN — OXYCODONE HYDROCHLORIDE 5 MG: 5 TABLET ORAL at 10:12

## 2021-02-01 RX ADMIN — VENLAFAXINE HYDROCHLORIDE 300 MG: 150 CAPSULE, EXTENDED RELEASE ORAL at 09:07

## 2021-02-01 RX ADMIN — ACETAMINOPHEN 1000 MG: 500 TABLET, FILM COATED ORAL at 20:57

## 2021-02-01 RX ADMIN — GUANFACINE 1 MG: 1 TABLET ORAL at 22:55

## 2021-02-01 RX ADMIN — PROPAFENONE HYDROCHLORIDE 150 MG: 150 TABLET, FILM COATED ORAL at 14:48

## 2021-02-01 RX ADMIN — LIOTHYRONINE SODIUM 25 MCG: 25 TABLET ORAL at 09:08

## 2021-02-01 RX ADMIN — ACETAMINOPHEN 1000 MG: 500 TABLET, FILM COATED ORAL at 09:07

## 2021-02-01 RX ADMIN — MEMANTINE 10 MG: 5 TABLET ORAL at 20:57

## 2021-02-01 RX ADMIN — SIMVASTATIN 40 MG: 40 TABLET, FILM COATED ORAL at 22:55

## 2021-02-01 RX ADMIN — LAMOTRIGINE 25 MG: 25 TABLET ORAL at 09:08

## 2021-02-01 RX ADMIN — POLYETHYLENE GLYCOL 3350 17 G: 17 POWDER, FOR SOLUTION ORAL at 09:07

## 2021-02-01 ASSESSMENT — ACTIVITIES OF DAILY LIVING (ADL)
ADLS_ACUITY_SCORE: 17
ADLS_ACUITY_SCORE: 17
ADLS_ACUITY_SCORE: 19
ADLS_ACUITY_SCORE: 17
ADLS_ACUITY_SCORE: 17
ADLS_ACUITY_SCORE: 19

## 2021-02-01 ASSESSMENT — MIFFLIN-ST. JEOR: SCORE: 2264.09

## 2021-02-01 NOTE — PROGRESS NOTES
[facility] Nurse Inpatient Wound Assessment   Reason for consultation: Evaluate and treat  LLE wounds    Assessment  LLE wounds due to Trauma  Status: initial assessment  Wounds granular and improving, upper wound was bleeding today and required gel foam to control bleeding.  1 foam applied in each wound and bridged together.  Treatment Plan  LLE wounds:  Monday/Wednesday/Friday  1. Cleanse with wound cleanser and dry   2. Apply Mepilex lite (cut to fit) to superficial middle wound, cover with VAC drape  3. Apply Wound VAC dressing-one foam in each wound  4. Bridge wounds together  5. Settin mmHg continuous   Orders Reviewed  Recommended provider order: None, at this time  WO Nurse follow-up plan:Monday/  Nursing to notify the Provider(s) and re-consult the WO Nurse if wound(s) deteriorates or new skin concern.    Patient History  According to provider note(s):  Todd S Aschoff is a 65 year old male who has a past medical history of Alcohol dependence, Allergy, Aortic valve prosthesis, Atrial fibrillation, BPH, Chronic pain,  Dissection of aorta, thoracic  (), Headache, Heart murmur, History of spinal cord injury, Low back pain, Major depression, Mixed hyperlipidemia, Numbness and tingling, OA (osteoarthritis), Obesity, Sciatica, and hypothyroidism among others.  He was recently discharged after incision and drainage of infected hematoma in the left lower extremity, patient has been recovering at home for that.  He presents with confusion, generalized weakness and very low blood pressure.  Evidence of dehydration on physical exam, acute kidney injury and quick response to volume expansion and fluid resuscitation in the emergency department.    Objective Data  Containment of urine/stool: Continent of bladder and Continent of bowel    Active Diet Order  Orders Placed This Encounter      Regular Diet Adult      Output:   I/O last 3 completed shifts:  In: 480 [P.O.:480]  Out:  [Urine:2150;  Drains:5]    Risk Assessment:   Sensory Perception: 3-->slightly limited  Moisture: 4-->rarely moist  Activity: 3-->walks occasionally  Mobility: 3-->slightly limited  Nutrition: 3-->adequate  Friction and Shear: 3-->no apparent problem  Ascencion Score: 19                          Labs:   Recent Labs   Lab 02/01/21  0641 01/28/21  0628 01/28/21  0628   HGB 11.9*   < > 11.3*   INR  --   --  1.17*   WBC 6.8   < > 8.7    < > = values in this interval not displayed.       Physical Exam  Areas of skin assessed: focused LLE    Wound Location:  LLE  Date of last photo 2/1/21  Wound History: Patient injured leg after falling in December.  Required I&D on 1/10/21 for an infected hematoma.  Patient had debridement 1/26/21.              Wound Base: Superior wound is 5.5x2.5x1cm, 100% granulation tissue and subcutaneous tissue.  Middle wound is 2x1.5cm, 100% agranular.  Distal wound is [id number], 100% granulation tissue and subcutaneous tissue     Palpation of the wound bed: normal      Drainage: moderate     Description of drainage: serosanguinous     Measurements (length x width x depth, in cm) see above     Tunneling N/A     Undermining N/A  Periwound skin: see above      Color: pink      Temperature: normal   Odor: none  Pain: moderate, sharp.  PO medication prior to change.    Interventions  Visual inspection and assessment completed   Wound Care Rationale Promote moist wound healing without tissue dehydration  and Gently fill dead space to prevent abscess formation   Wound Care: done per plan of care  Supplies: gathered and placed at the bedside  Current off-loading measures: Pillows  Current support surface: Standard  Atmos Air mattress  Education provided to: plan of care  Discussed plan of care with Patient and Nurse    Torin Hand RN CWOCN

## 2021-02-01 NOTE — PLAN OF CARE
"/68 (BP Location: Left arm)   Pulse 74   Temp 97.2  F (36.2  C) (Temporal)   Resp 16   Ht 1.854 m (6' 1\")   Wt 147.9 kg (326 lb 1.6 oz)   SpO2 95%   BMI 43.02 kg/m       Resting comfortably. Declined need for pain meds. Wound vac and JOHN in place. LLE elevated.  "

## 2021-02-01 NOTE — PROGRESS NOTES
[facility]  Hospitalist Progress Note  Maurice Patton MD 02/01/2021    Reason for Stay (Diagnosis): AMS, weakness, hypotension         Assessment and Plan:      Summary of Stay: Todd S Aschoff is a 65 year old male with past medical history of aortic dissection status post mechanical aortic valve plus graft (1992), atrial fibrillation (chronic anticoagulation with Coumadin), chronic pain, dementia and depression who was hospitalized 1/9-1/13 here for left leg wound with drainage (initially caused by a fall approximately 1 month prior) which was found to be infected hematoma requiring I&D (1/10) and ultimately discharged home on oral antibiotics who was admitted on 1/22/2021 with generalized weakness, confusion and hypotension.  Patient was found to have acute CVA.  Also likely had a seizure prior to admission.      Patient underwent surgery with plastics on 1/26 with removal of 1400 mL of hematoma (no active bleeding) and placement of wound vac.  Wound improving so wound vac changed at the bedside on 1/28. Will need [facility] wound vac changes, Dr. Plascencia planning to see the patient on 2/3 to see if the wound is ready for skin graft.  This would be done this admission if at all possible.  For that reason will continue to hold Coumadin and use heparin drip.    Update: planning to go to OR AM of 1/3/21.    Problem List/Assessment and Plan:     Acute right frontal infarct, suspect this is a silent infarct as he has no neurological deficits: Patient did undergo CT of the head which showed age indeterminate right occipital infarct.  MRI of the brain showed an acute infarct in the right frontal vertex and multiple chronic infarcts in the right occipital lobe and bilateral cerebellar hemispheres.  MRA shows possible diffuse stenosis of the left PCA.  TTE showed no change from prior.  He has no neurological deficits.  -Stroke neurology consulted, appreciate recommendations  -Continue simvastatin 40 mg  daily  -Continue Coumadin as PTA  -Follow-up with PCP in 1 to 2 weeks, follow-up with neurology in clinic in 8 weeks    Suspected seizure: Patient's wife describes seizure-like activity prior to the events that led to his hospitalization.  He had a right arm shaking and unresponsiveness.  He does have an abnormal MRI as above which certainly could serve as a seizure focus.  Neurology recommended starting lamotrigine. No further seizure activity.  -Seizure precautions  -General neurology consulted, appreciate recommendations  -Started lamotrigine 25 mg daily on 1/24  -Need to follow-up with general neurology in 2 weeks (at that time lamotrigine can be increased to 50 mg daily and EEG can be done) - depending on how long patient is hospitalized may need to touch base with Neurology prior to discharge to see if the Lamotrigine needs to be increased before his visit  -No driving, climbing, tub baths or potential dangerous activity in the next 3 months    Left lower extremity infected hematoma status post I&D on 1/10, nonhealing with associated cellulitis s/p Large hematoma evaluation and placement of wound vac on 1/26.  S/p fall in December 2020.  During his last admission was found to have infected hematoma.  Underwent I&D with Ortho on 1/10.  His wound culture grew E. coli and Enterococcus faecalis.  ID did follow throughout that admission and felt that the pathogen was E. coli.  He was treated with IV ceftriaxone and at time of discharge cefuroxime for an additional 7 days.  He has had dehiscence of the lower wound with surrounding necrosis.  He underwent I&D with evacuation of 1400 mL hematoma (no active bleeding) on 1/26 with placement of wound vac.  Wound improving after this procedure per Dr. Plascencia.  -Orthopedics consulted, appreciate recommendations  -Plastic surgery consulted, appreciate recommendations  -Wound vac change MWF  -Dr. Plascencia to evaluate the wound on 2/3 to determine if skin graft can be  performed  -Currently on heparin drip, continue to hold Coumadin as he will go back to OR eventually for screening grafting  -Completed ceftriaxone 7 days on 1/29/21.  -Wound nurse consulted, appreciate recommendations    Acute toxic/metabolic encephalopathy - Resolved: Presented with diffuse weakness and significant confusion.  He had significant hypotension.  With hydration his mental status did improve.  I suspect his confusion was multifactorial due to likely seizure, hypotension, acute kidney injury.      Acute kidney injury.  Improved.  Baseline creatinine approximately 1.  On admission creatinine was elevated at 1.58, peaked 1.66.  He is urinating.  He was quite hypotensive on admission which could have lead to ATN causing his renal insufficiency.   -No NSAIDs  -BMP in the morning     Left Shoulder Pain: Patient had pain in the left shoulder (fall/seizure PTA as above).  Orthopedics consulted.  He has no fracture but does have left RC tendinitis with AC joint arthrosis and probable loose bodies.  He underwent left shoulder subacromial injection on 2/25 and feels that this has helped his pain.  -Follow-up with [facility] in outpatient setting for further work-up if persistent pain/decreased mobility    Hypotension - Resolved: Patient had rather profound hypotension on presentation.  This improved significantly with fluids.  Blood pressure now is stable.     Chronic anticoagulation with warfarin for mechanical aortic valve and atrial fibrillation. INR was supratherapeutic at 6.9 (1/23).  INR reversed for surgery with Vitamin K.  He was subsequently started on heparin drip in the perioperative setting.  His goal INR level is 2.5-3.5.    -Heparin drip to be continued due to future surgery, possibly next week, see above  -Continue to hold Coumadin   -Likely transition to Lovenox for bridging after all of his surgical procedures are complete  -have discontinued the pharmacy to dose Coumadin order as he will be off of  "Coumadin at least through mid next week.  Once Coumadin can be resumed this order needs to be replaced    Acute Anemia: Historically Hgb had been normal, decreased to 12 range during his last hospitalization.  This admission was 12.8 on admission, has decreased to 11.3 but stable over the past few days.  Did have large hematoma evacuated of the LLE on 1/26 as above, no active bleeding.  Iron checked and normal. No indication for transfusion at this time.  -Daily Hgb     Atrial fibrillation.  Resumed the patient on his propafenone.  Anticoagulation plan as above.     Chronic pain.  Patient is on Lyrica.  As needed oxycodone also available in addition to Tylenol.     Hypothyroidism.  Patient is on levothyroxine and liothyronine.     Depression.  Continue Effexor.    Dementia.  Continue prior to admission Namenda and Aricept.    Weakness: PT/OT consulted.  Have recommended TCU which patient and his wife are agreeable to.    Diet: Snacks/Supplements Adult: Boost Shake; Between Meals  Regular Diet Adult    DVT Prophylaxis: Warfarin  Zimmer Catheter: not present  Code Status: Full Code      Disposition Plan   Expected discharge: To OR on 1/3, plan to keep him here five days after per Dr. Plascencia for wound vac/cares days, recommended to transitional care unit once safe disposition plan/ TCU bed available and skin grafting completed on LLE.  Entered: Maurice Patton MD 02/01/2021, 8:02 AM       The patient's care was discussed with the Bedside Nurse and Patient.    Hospitalist Service  [facility]          Interval History (Subjective):        Continuing wound vac, to be changed today  Feeling OK overall, bowels working better w/ miralax.  Spoke w/ Dr. Plascencia, plan to go to the OR on Wednesday  Updated his wife                    Physical Exam:      Last Vital Signs:  BP (!) 141/74 (BP Location: Left arm)   Pulse 67   Temp 98  F (36.7  C) (Temporal)   Resp 18   Ht 1.854 m (6' 1\")   Wt 142 " kg (313 lb 1.6 oz)   SpO2 96%   BMI 41.31 kg/m      General: Awake, alert, NAD  HEENT: Normocephalic and atraumatic, eyes anicteric and without scleral injection, EOMI, face symmetric, MMM.  Cardiac: RRR, normal S1, S2. No m/g/r, no LE edema.  Pulmonary: Normal chest rise, normal work of breathing.  Lungs CTAB without crackles or wheezing.  Abdomen: soft, non-tender, non-distended.  Normoactive bowel sounds, no guarding or rebound tenderness.  Extremities: no deformities.  Warm, well perfused.  Skin: no rashes or lesions.  Warm and Dry.  Wound vac in place with ACE wrap from left knee to ankle, drain also present.  Neuro: No focal deficits.  Speech clear.  Coordination and strength grossly normal.  Psych: Alert and oriented x3. Appropriate affect.         Medications:      All current medications were reviewed with changes reflected in problem list.         Data:      All new lab and imaging data was reviewed.   Labs:  Recent Labs   Lab 02/01/21  0641 01/29/21  0640 01/28/21  0628    140 140   POTASSIUM 4.8 4.0 4.3   CHLORIDE 105 106 106   CO2 31 37* 35*   ANIONGAP 3 <1* <1*   GLC 90 89 97   BUN 21 21 17   CR 1.11 1.19 1.19   GFRESTIMATED 70 64 64   GFRESTBLACK 81 74 74   CATERINA 9.0 8.7 8.7     Recent Labs   Lab 02/01/21  0641 01/29/21  0640 01/28/21  1329 01/28/21  0628   WBC 6.8 9.2  --  8.7   HGB 11.9* 11.4* 11.6* 11.3*   HCT 38.5* 37.1*  --  36.1*    101*  --  100    259  --  239     Recent Labs   Lab 01/28/21  0628 01/27/21  0559 01/26/21  0623   INR 1.17* 1.36* 1.62*      Imaging:   No results found for this or any previous visit (from the past 24 hour(s)).    Maurice Patton MD

## 2021-02-01 NOTE — PROGRESS NOTES
S: Todd S Aschoff is a 65 year old male patient s/p LLE washout with VAC placement on 1/26/2021. Wound VAC changed today at bedside appears to be improving with negative pressure therapy, no purulence or malodor.    O:  Area around the VAC appears without cellulitis. JOHN holding suction continuing to produce small amount of S/S output.      Admission on 01/09/2021, Discharged on 01/13/2021   Component Date Value Ref Range Status     WBC 01/09/2021 6.0  4.0 - 11.0 10e9/L Final     RBC Count 01/09/2021 4.83  4.4 - 5.9 10e12/L Final     Hemoglobin 01/09/2021 15.6  13.3 - 17.7 g/dL Final     Hematocrit 01/09/2021 47.3  40.0 - 53.0 % Final     MCV 01/09/2021 98  78 - 100 fl Final     MCH 01/09/2021 32.3  26.5 - 33.0 pg Final     MCHC 01/09/2021 33.0  31.5 - 36.5 g/dL Final     RDW 01/09/2021 13.9  10.0 - 15.0 % Final     Platelet Count 01/09/2021 246  150 - 450 10e9/L Final     Diff Method 01/09/2021 Automated Method   Final     % Neutrophils 01/09/2021 64.7  % Final     % Lymphocytes 01/09/2021 23.3  % Final     % Monocytes 01/09/2021 9.0  % Final     % Eosinophils 01/09/2021 2.2  % Final     % Basophils 01/09/2021 0.5  % Final     % Immature Granulocytes 01/09/2021 0.3  % Final     Nucleated RBCs 01/09/2021 0  0 /100 Final     Absolute Neutrophil 01/09/2021 3.9  1.6 - 8.3 10e9/L Final     Absolute Lymphocytes 01/09/2021 1.4  0.8 - 5.3 10e9/L Final     Absolute Monocytes 01/09/2021 0.5  0.0 - 1.3 10e9/L Final     Absolute Eosinophils 01/09/2021 0.1  0.0 - 0.7 10e9/L Final     Absolute Basophils 01/09/2021 0.0  0.0 - 0.2 10e9/L Final     Abs Immature Granulocytes 01/09/2021 0.0  0 - 0.4 10e9/L Final     Absolute Nucleated RBC 01/09/2021 0.0   Final     Sodium 01/09/2021 140  133 - 144 mmol/L Final     Potassium 01/09/2021 4.3  3.4 - 5.3 mmol/L Final     Chloride 01/09/2021 105  94 - 109 mmol/L Final     Carbon Dioxide 01/09/2021 34* 20 - 32 mmol/L Final     Anion Gap 01/09/2021 1* 3 - 14 mmol/L Final     Glucose  01/09/2021 146* 70 - 99 mg/dL Final     Urea Nitrogen 01/09/2021 13  7 - 30 mg/dL Final     Creatinine 01/09/2021 1.02  0.66 - 1.25 mg/dL Final     GFR Estimate 01/09/2021 77  >60 mL/min/[1.73_m2] Final    Comment: Non  GFR Calc  Starting 12/18/2018, serum creatinine based estimated GFR (eGFR) will be   calculated using the Chronic Kidney Disease Epidemiology Collaboration   (CKD-EPI) equation.       GFR Estimate If Black 01/09/2021 90  >60 mL/min/[1.73_m2] Final    Comment:  GFR Calc  Starting 12/18/2018, serum creatinine based estimated GFR (eGFR) will be   calculated using the Chronic Kidney Disease Epidemiology Collaboration   (CKD-EPI) equation.       Calcium 01/09/2021 8.7  8.5 - 10.1 mg/dL Final     Bilirubin Total 01/09/2021 1.0  0.2 - 1.3 mg/dL Final     Albumin 01/09/2021 3.4  3.4 - 5.0 g/dL Final     Protein Total 01/09/2021 6.9  6.8 - 8.8 g/dL Final     Alkaline Phosphatase 01/09/2021 96  40 - 150 U/L Final     ALT 01/09/2021 23  0 - 70 U/L Final     AST 01/09/2021 28  0 - 45 U/L Final     INR 01/09/2021 3.26* 0.86 - 1.14 Final     PTT 01/09/2021 46* 22 - 37 sec Final     Specimen Description 01/09/2021 Blood Left Arm   Final     Culture Micro 01/09/2021 No growth   Final     Specimen Description 01/09/2021 Blood Left Hand   Final     Culture Micro 01/09/2021 No growth   Final     Creatinine 01/09/2021 1.0  0.66 - 1.25 mg/dL Final     GFR Estimate 01/09/2021 75  >60 mL/min/[1.73_m2] Final     GFR Estimate If Black 01/09/2021 >90  >60 mL/min/[1.73_m2] Final     SARS-CoV-2 Virus Specimen Source 01/09/2021 Nasopharyngeal   Final     SARS-CoV-2 PCR Result 01/09/2021 NEGATIVE   Final    SARS-CoV2 (COVID-19) RNA not detected, presumed negative.     SARS-CoV-2 PCR Comment 01/09/2021 (Note)   Final    Comment: Testing was performed using the claudine SARS-CoV-2 & Influenza A/B Assay on the   claudine Angie System.  This test should be ordered for the detection of SARS-COV-2 in  individuals who   meet SARS-CoV-2 clinical and/or epidemiological criteria. Test performance is   unknown in asymptomatic patients.  This test is for in vitro diagnostic use under the FDA EUA for laboratories   certified under CLIA to perform moderate and/or high complexity testing. This   test has not been FDA cleared or approved.  A negative test does not rule out the presence of PCR inhibitors in the   specimen or target RNA in concentration below the limit of detection for the   assay. The possibility of a false negative should be considered if the   patient's recent exposure or clinical presentation suggests COVID-19.  [facility] are certified under the Clinical Laboratory   Improvement Amendments of 1988 (CLIA-88) as qualified to perform moderate   and/or high complexity laboratory testing.       INR 01/09/2021 3.27* 0.86 - 1.14 Final     INR 01/09/2021 2.74* 0.86 - 1.14 Final     INR 01/10/2021 2.22* 0.86 - 1.14 Final     WBC 01/10/2021 6.0  4.0 - 11.0 10e9/L Final     RBC Count 01/10/2021 4.61  4.4 - 5.9 10e12/L Final     Hemoglobin 01/10/2021 14.7  13.3 - 17.7 g/dL Final     Hematocrit 01/10/2021 45.7  40.0 - 53.0 % Final     MCV 01/10/2021 99  78 - 100 fl Final     MCH 01/10/2021 31.9  26.5 - 33.0 pg Final     MCHC 01/10/2021 32.2  31.5 - 36.5 g/dL Final     RDW 01/10/2021 14.0  10.0 - 15.0 % Final     Platelet Count 01/10/2021 214  150 - 450 10e9/L Final     Sodium 01/10/2021 138  133 - 144 mmol/L Final     Potassium 01/10/2021 3.8  3.4 - 5.3 mmol/L Final     Chloride 01/10/2021 107  94 - 109 mmol/L Final     Carbon Dioxide 01/10/2021 30  20 - 32 mmol/L Final     Anion Gap 01/10/2021 1* 3 - 14 mmol/L Final     Glucose 01/10/2021 85  70 - 99 mg/dL Final     Urea Nitrogen 01/10/2021 15  7 - 30 mg/dL Final     Creatinine 01/10/2021 0.93  0.66 - 1.25 mg/dL Final     GFR Estimate 01/10/2021 87  >60 mL/min/[1.73_m2] Final    Comment: Non  GFR Calc  Starting 12/18/2018, serum  creatinine based estimated GFR (eGFR) will be   calculated using the Chronic Kidney Disease Epidemiology Collaboration   (CKD-EPI) equation.       GFR Estimate If Black 01/10/2021 >90  >60 mL/min/[1.73_m2] Final    Comment:  GFR Calc  Starting 12/18/2018, serum creatinine based estimated GFR (eGFR) will be   calculated using the Chronic Kidney Disease Epidemiology Collaboration   (CKD-EPI) equation.       Calcium 01/10/2021 8.8  8.5 - 10.1 mg/dL Final     INR 01/10/2021 1.70* 0.86 - 1.14 Final     INR 01/10/2021 1.88* 0.86 - 1.14 Final     Specimen Description 01/10/2021 Tissue Left LOWER LEG SPECIMEN 1   Final     Special Requests 01/10/2021 Surgery   Final     Culture Micro 01/10/2021 *  Final                    Value:Light growth  Mixed aerobic and anaerobic esther       Culture Micro 01/10/2021 No predominant anaerobes   Final     Specimen Description 01/10/2021 Tissue Left Leg SPECIMEN 1   Final     Special Requests 01/10/2021    Final                    Value:Surgery  Specimen collected in surgery       Gram Stain 01/10/2021 No organisms seen   Final     Gram Stain 01/10/2021    Final                    Value:Rare  WBC'S seen       Specimen Description 01/10/2021 Left Leg SPECIMEN 1   Final     Special Requests 01/10/2021    Final                    Value:Surgery  Specimen collected in surgery       Culture Micro 01/10/2021 *  Final                    Value:Moderate growth  Escherichia coli       Culture Micro 01/10/2021 *  Final                    Value:Moderate growth  Citrobacter koseri       Culture Micro 01/10/2021 *  Final                    Value:Light growth  Enterococcus faecalis       PTT 01/10/2021 36  22 - 37 sec Final     Heparin Unfractionated Anti Xa Lev* 01/10/2021 <0.10  IU/mL Final    Comment: Therapeutic Range:                UFH: 0.25-0.50 IU/mL for low                     intensity dosing                     0.30-0.70 IU/mL for high                     intensity dosing for                      DVT and PE  This test is not validated for other direct factor X inhibitors (e.g.   rivaroxaban, apixaban, edoxaban, betrixaban, fondaparinux) and should not be   used for monitoring of other medications.       Heparin Unfractionated Anti Xa Lev* 01/10/2021 0.33  IU/mL Final    Comment: Therapeutic Range:                UFH: 0.25-0.50 IU/mL for low                     intensity dosing                     0.30-0.70 IU/mL for high                     intensity dosing for                     DVT and PE  This test is not validated for other direct factor X inhibitors (e.g.   rivaroxaban, apixaban, edoxaban, betrixaban, fondaparinux) and should not be   used for monitoring of other medications.       Heparin Unfractionated Anti Xa Lev* 01/11/2021 0.43  IU/mL Final    Comment: Therapeutic Range:                UFH: 0.25-0.50 IU/mL for low                     intensity dosing                     0.30-0.70 IU/mL for high                     intensity dosing for                     DVT and PE  This test is not validated for other direct factor X inhibitors (e.g.   rivaroxaban, apixaban, edoxaban, betrixaban, fondaparinux) and should not be   used for monitoring of other medications.       Hemoglobin 01/11/2021 13.6  13.3 - 17.7 g/dL Final     INR 01/11/2021 1.36* 0.86 - 1.14 Final     Glucose 01/11/2021 100* 70 - 99 mg/dL Final     Hemoglobin 01/12/2021 13.8  13.3 - 17.7 g/dL Final     INR 01/12/2021 1.47* 0.86 - 1.14 Final     Sodium 01/12/2021 137  133 - 144 mmol/L Final     Potassium 01/12/2021 4.8  3.4 - 5.3 mmol/L Final     Chloride 01/12/2021 101  94 - 109 mmol/L Final     Carbon Dioxide 01/12/2021 37* 20 - 32 mmol/L Final     Anion Gap 01/12/2021 <1* 3 - 14 mmol/L Final     Glucose 01/12/2021 116* 70 - 99 mg/dL Final     Urea Nitrogen 01/12/2021 10  7 - 30 mg/dL Final     Creatinine 01/12/2021 0.97  0.66 - 1.25 mg/dL Final     GFR Estimate 01/12/2021 82  >60 mL/min/[1.73_m2] Final    Comment: Non  " GFR Calc  Starting 12/18/2018, serum creatinine based estimated GFR (eGFR) will be   calculated using the Chronic Kidney Disease Epidemiology Collaboration   (CKD-EPI) equation.       GFR Estimate If Black 01/12/2021 >90  >60 mL/min/[1.73_m2] Final    Comment:  GFR Calc  Starting 12/18/2018, serum creatinine based estimated GFR (eGFR) will be   calculated using the Chronic Kidney Disease Epidemiology Collaboration   (CKD-EPI) equation.       Calcium 01/12/2021 9.0  8.5 - 10.1 mg/dL Final     INR 01/13/2021 2.01* 0.86 - 1.14 Final     WBC 01/13/2021 6.8  4.0 - 11.0 10e9/L Final     RBC Count 01/13/2021 3.87* 4.4 - 5.9 10e12/L Final     Hemoglobin 01/13/2021 12.3* 13.3 - 17.7 g/dL Final     Hematocrit 01/13/2021 39.4* 40.0 - 53.0 % Final     MCV 01/13/2021 102* 78 - 100 fl Final     MCH 01/13/2021 31.8  26.5 - 33.0 pg Final     MCHC 01/13/2021 31.2* 31.5 - 36.5 g/dL Final     RDW 01/13/2021 14.1  10.0 - 15.0 % Final     Platelet Count 01/13/2021 190  150 - 450 10e9/L Final     BP (!) 141/74 (BP Location: Left arm)   Pulse 67   Temp 98  F (36.7  C) (Temporal)   Resp 18   Ht 1.854 m (6' 1\")   Wt 142 kg (313 lb 1.6 oz)   SpO2 96%   BMI 41.31 kg/m      A/P    Patient appears to be doing well and will plan for Split Thickness Skin Graft versus Closure on this admission. Likely late this week or early next week. After the skin graft he will require VAC therapy for 5 days. After the surgery patient may be transitioned back to his routine form of anticoagulation.    Continue PT/OT for mobility  Patient may weight bear as tolerated  OK to flex and extend ankle as comfortable but will need boot back on after the skin graft procedure        "

## 2021-02-01 NOTE — PLAN OF CARE
"/74 (BP Location: Left arm)   Pulse 77   Temp 97.1  F (36.2  C) (Temporal)   Resp 16   Ht 1.854 m (6' 1\")   Wt 147.9 kg (326 lb 1.6 oz)   SpO2 97%   BMI 43.02 kg/m        Cardiac: WDL, click sound heard, no edema  Respiratory: WDL, lung sounds clear, equal bilaterally  Mobility: Assist of 1 with gait belt and walker  GI/: Voiding spontaneously. Have been given senna and Miralax. No BM today.   Skin: Dressing is clean, dry and intact, Wound vac in place   Pain: Patient reports intermittent pain and rates the pain at 4  Hearing: Patient has bilateral hearing loss  Heparin running at 1,200 units per hour  Plan: Continue to monitor patient for pain. Continue to monitor wound healing. Possible skin graft in about 6 days.    "

## 2021-02-01 NOTE — PLAN OF CARE
"Hep gtt 12ml/hr.  Tolerable interm. shooting pain managed with rest & minimal oxycodone, encouraged elevation blue wedge pillow.  No nausea.  LS diminished bilaterally, RA.  Wound vac, WOC changed foam drsg.  JOHN patent very minimal drainage, line stripped.  Up with A1 belt walker, per Dr. Plascencia's note \"Patient may weight bear as tolerated\".  Plan is surgery \"likely late this week or early next week\".   "

## 2021-02-02 ENCOUNTER — APPOINTMENT (OUTPATIENT)
Dept: OCCUPATIONAL THERAPY | Facility: CLINIC | Age: 66
End: 2021-02-02
Payer: COMMERCIAL

## 2021-02-02 ENCOUNTER — APPOINTMENT (OUTPATIENT)
Dept: PHYSICAL THERAPY | Facility: CLINIC | Age: 66
End: 2021-02-02
Payer: COMMERCIAL

## 2021-02-02 LAB — UFH PPP CHRO-ACNC: 0.44 IU/ML

## 2021-02-02 PROCEDURE — 85520 HEPARIN ASSAY: CPT | Performed by: INTERNAL MEDICINE

## 2021-02-02 PROCEDURE — 250N000013 HC RX MED GY IP 250 OP 250 PS 637: Performed by: STUDENT IN AN ORGANIZED HEALTH CARE EDUCATION/TRAINING PROGRAM

## 2021-02-02 PROCEDURE — 99233 SBSQ HOSP IP/OBS HIGH 50: CPT | Performed by: INTERNAL MEDICINE

## 2021-02-02 PROCEDURE — 97110 THERAPEUTIC EXERCISES: CPT | Mod: GO | Performed by: REHABILITATION PRACTITIONER

## 2021-02-02 PROCEDURE — 36415 COLL VENOUS BLD VENIPUNCTURE: CPT | Performed by: INTERNAL MEDICINE

## 2021-02-02 PROCEDURE — 250N000011 HC RX IP 250 OP 636: Performed by: INTERNAL MEDICINE

## 2021-02-02 PROCEDURE — 120N000001 HC R&B MED SURG/OB

## 2021-02-02 PROCEDURE — 250N000013 HC RX MED GY IP 250 OP 250 PS 637: Performed by: INTERNAL MEDICINE

## 2021-02-02 PROCEDURE — 97116 GAIT TRAINING THERAPY: CPT | Mod: GP | Performed by: PHYSICAL THERAPIST

## 2021-02-02 RX ADMIN — PREGABALIN 100 MG: 100 CAPSULE ORAL at 09:20

## 2021-02-02 RX ADMIN — DOCUSATE SODIUM 100 MG: 100 CAPSULE, LIQUID FILLED ORAL at 20:35

## 2021-02-02 RX ADMIN — MEMANTINE 10 MG: 5 TABLET ORAL at 09:21

## 2021-02-02 RX ADMIN — OXYCODONE HYDROCHLORIDE 5 MG: 5 TABLET ORAL at 22:10

## 2021-02-02 RX ADMIN — MEMANTINE 10 MG: 5 TABLET ORAL at 20:36

## 2021-02-02 RX ADMIN — HEPARIN SODIUM 1200 UNITS/HR: 10000 INJECTION, SOLUTION INTRAVENOUS at 12:54

## 2021-02-02 RX ADMIN — ACETAMINOPHEN 1000 MG: 500 TABLET, FILM COATED ORAL at 20:38

## 2021-02-02 RX ADMIN — DONEPEZIL HYDROCHLORIDE 20 MG: 10 TABLET ORAL at 22:10

## 2021-02-02 RX ADMIN — POLYETHYLENE GLYCOL 3350 17 G: 17 POWDER, FOR SOLUTION ORAL at 09:20

## 2021-02-02 RX ADMIN — ACETAMINOPHEN 1000 MG: 500 TABLET, FILM COATED ORAL at 09:20

## 2021-02-02 RX ADMIN — PROPAFENONE HYDROCHLORIDE 150 MG: 150 TABLET, FILM COATED ORAL at 22:10

## 2021-02-02 RX ADMIN — LEVOTHYROXINE SODIUM 150 MCG: 150 TABLET ORAL at 09:20

## 2021-02-02 RX ADMIN — DOCUSATE SODIUM 100 MG: 100 CAPSULE, LIQUID FILLED ORAL at 09:20

## 2021-02-02 RX ADMIN — PREGABALIN 100 MG: 100 CAPSULE ORAL at 20:36

## 2021-02-02 RX ADMIN — SIMVASTATIN 40 MG: 40 TABLET, FILM COATED ORAL at 22:10

## 2021-02-02 RX ADMIN — FOLIC ACID 5 MG: 1 TABLET ORAL at 09:20

## 2021-02-02 RX ADMIN — PROPAFENONE HYDROCHLORIDE 150 MG: 150 TABLET, FILM COATED ORAL at 15:04

## 2021-02-02 RX ADMIN — ACETAMINOPHEN 1000 MG: 500 TABLET, FILM COATED ORAL at 15:04

## 2021-02-02 RX ADMIN — PREGABALIN 100 MG: 100 CAPSULE ORAL at 15:04

## 2021-02-02 RX ADMIN — GUANFACINE 1 MG: 1 TABLET ORAL at 22:10

## 2021-02-02 RX ADMIN — OXYCODONE HYDROCHLORIDE 5 MG: 5 TABLET ORAL at 10:37

## 2021-02-02 RX ADMIN — VENLAFAXINE HYDROCHLORIDE 300 MG: 150 CAPSULE, EXTENDED RELEASE ORAL at 09:20

## 2021-02-02 RX ADMIN — ZINC SULFATE 220 MG (50 MG) CAPSULE 220 MG: CAPSULE at 09:20

## 2021-02-02 RX ADMIN — Medication 500 MG: at 09:21

## 2021-02-02 RX ADMIN — LAMOTRIGINE 25 MG: 25 TABLET ORAL at 09:21

## 2021-02-02 RX ADMIN — PROPAFENONE HYDROCHLORIDE 150 MG: 150 TABLET, FILM COATED ORAL at 05:40

## 2021-02-02 RX ADMIN — MULTIPLE VITAMINS W/ MINERALS TAB 1 TABLET: TAB at 09:20

## 2021-02-02 RX ADMIN — LIOTHYRONINE SODIUM 25 MCG: 25 TABLET ORAL at 09:21

## 2021-02-02 ASSESSMENT — ACTIVITIES OF DAILY LIVING (ADL)
ADLS_ACUITY_SCORE: 21
ADLS_ACUITY_SCORE: 19
ADLS_ACUITY_SCORE: 19
ADLS_ACUITY_SCORE: 21
ADLS_ACUITY_SCORE: 20
ADLS_ACUITY_SCORE: 19

## 2021-02-02 NOTE — PLAN OF CARE
A&O, Ax1, GB and walker to transfer, VSS. Left leg wound dressing is C/D/I. Wound vac on. Hep gtt running at 12ml/hr. LS clear equally bilaterally. Pt denies pain/discomfort.

## 2021-02-02 NOTE — PROGRESS NOTES
[facility]  Hospitalist Progress Note  Maurice Patton MD 02/02/2021    Reason for Stay (Diagnosis): AMS, weakness, hypotension         Assessment and Plan:      Summary of Stay: Todd S Aschoff is a 65 year old male with past medical history of aortic dissection status post mechanical aortic valve plus graft (1992), atrial fibrillation (chronic anticoagulation with Coumadin), chronic pain, dementia and depression who was hospitalized 1/9-1/13 here for left leg wound with drainage (initially caused by a fall approximately 1 month prior) which was found to be infected hematoma requiring I&D (1/10) and ultimately discharged home on oral antibiotics who was admitted on 1/22/2021 with generalized weakness, confusion and hypotension.  Patient was found to have acute CVA but no longer has any focal deficits.  Also likely had a seizure prior to admission.      Patient underwent surgery with plastics on 1/26 with removal of 1400 mL of hematoma (no active bleeding) and placement of wound vac.  Wound improving so wound vac changed at the bedside on 1/28.  Has been undergoing [facility] wound vac changes, Dr. Plascencia planning to take the patient to the operating room on Wednesday, 2/3 for skin graft.  Per my discussion with him tentative plan to keep the patient here for 5 days after his skin graft for wound cares etc. he will be n.p.o. at midnight and heparin will be held starting 3:30 in the morning.  We will need to restart heparin likely the evening after surgery though defer to Dr. Plascencia if this plan needs to change.    Problem List/Assessment and Plan:     Acute right frontal infarct, suspect this is a silent infarct as he has no neurological deficits: Patient did undergo CT of the head which showed age indeterminate right occipital infarct.  MRI of the brain showed an acute infarct in the right frontal vertex and multiple chronic infarcts in the right occipital lobe and bilateral cerebellar hemispheres.   MRA shows possible diffuse stenosis of the left PCA.  TTE showed no change from prior.  He has no neurological deficits.  -Stroke neurology consulted, appreciate recommendations  -Continue simvastatin 40 mg daily  -Continue Coumadin as PTA, bridging with heparin perioperatively.  -Follow-up with PCP in 1 to 2 weeks, follow-up with neurology in clinic in 8 weeks    Suspected seizure: Patient's wife describes seizure-like activity prior to the events that led to his hospitalization.  He had a right arm shaking and unresponsiveness.  He does have an abnormal MRI as above which certainly could serve as a seizure focus.  Neurology recommended starting lamotrigine. No further seizure activity.  -Seizure precautions  -General neurology consulted, appreciate recommendations  -Started lamotrigine 25 mg daily on 1/24  -Need to follow-up with general neurology in 1-2 weeks (at that time lamotrigine can be increased to 50 mg daily and EEG can be done) -given the plan to keep the patient here for 5 days after surgery may need to touch base with Neurology prior to discharge to see if the Lamotrigine needs to be increased before his discharge.  -No driving, climbing, tub baths or potential dangerous activity in the next 3 months    Left lower extremity infected hematoma status post I&D on 1/10, nonhealing with associated cellulitis s/p Large hematoma evaluation and placement of wound vac on 1/26.  S/p fall in December 2020.  During his last admission was found to have infected hematoma.  Underwent I&D with Ortho on 1/10.  His wound culture grew E. coli and Enterococcus faecalis.  ID did follow throughout that admission and felt that the pathogen was E. coli.  He was treated with IV ceftriaxone and at time of discharge cefuroxime for an additional 7 days.  He has had dehiscence of the lower wound with surrounding necrosis.  He underwent I&D with evacuation of 1400 mL hematoma (no active bleeding) on 1/26 with placement of wound  vac.  Wound improving after this procedure per Dr. Plascencia.  -Orthopedics consulted, appreciate recommendations  -Plastic surgery consulted, appreciate recommendations  -Wound vac change MWF  -Going back to the OR on 2/3 for skin grafting.  -Completed ceftriaxone 7 days on 1/29/21.  -Wound nurse consulted, appreciate recommendations    Acute toxic/metabolic encephalopathy - Resolved: Presented with diffuse weakness and significant confusion.  He had significant hypotension.  With hydration his mental status did improve.  I suspect his confusion was multifactorial due to likely seizure, hypotension, acute kidney injury.      Acute kidney injury.  Improved.  Baseline creatinine approximately 1.  On admission creatinine was elevated at 1.58, peaked 1.66.  He is urinating.  He was quite hypotensive on admission which could have lead to ATN causing his renal insufficiency.   -No NSAIDs  -BMP in the morning     Left Shoulder Pain: Patient had pain in the left shoulder (fall/seizure PTA as above).  Orthopedics consulted.  He has no fracture but does have left RC tendinitis with AC joint arthrosis and probable loose bodies.  He underwent left shoulder subacromial injection on 2/25 and feels that this has helped his pain.  -Follow-up with [facility] in outpatient setting for further work-up if persistent pain/decreased mobility    Hypotension - Resolved: Patient had rather profound hypotension on presentation.  This improved significantly with fluids.  Blood pressure now is stable.     Chronic anticoagulation with warfarin for mechanical aortic valve and atrial fibrillation. INR was supratherapeutic at 6.9 (1/23).  INR reversed for surgery with Vitamin K.  He was subsequently started on heparin drip in the perioperative setting.  His goal INR level is 2.5-3.5.    -Heparin drip for bridging pre-operatively.  Restart coumadin and bridge post-op.  -?transition to Lovenox for bridging after all of his surgical procedures are  "complete    Acute Anemia: Historically Hgb had been normal, decreased to 12 range during his last hospitalization.  This admission was 12.8 on admission, has decreased to 11.3 but stable over the past few days.  Did have large hematoma evacuated of the LLE on 1/26 as above, no active bleeding.  Iron checked and normal. No indication for transfusion at this time.  -Daily Hgb     Atrial fibrillation.  Resumed the patient on his propafenone.  Anticoagulation plan as above.     Chronic pain.  Patient is on Lyrica.  As needed oxycodone also available in addition to Tylenol.     Hypothyroidism.  Patient is on levothyroxine and liothyronine.     Depression.  Continue Effexor.    Dementia.  Continue prior to admission Namenda and Aricept.    Weakness: PT/OT consulted.  Have recommended TCU which patient and his wife are agreeable to.    Diet: Snacks/Supplements Adult: Boost Shake; Between Meals  Regular Diet Adult    DVT Prophylaxis: Warfarin  Zimmer Catheter: not present  Code Status: Full Code      Disposition Plan   Expected discharge: To OR on 1/3, plan to keep him here five days after per Dr. Plascencia for wound vac/cares days, recommended to transitional care unit once safe disposition plan/ TCU bed available and skin grafting completed on LLE.  Entered: Maurice Patton MD 02/02/2021, 8:02 AM       The patient's care was discussed with the Bedside Nurse and Patient.    Hospitalist Service  [facility]          Interval History (Subjective):        to OR tomorrow, n.p.o. midnight  Updated orders to stop heparin starting at 3:30 in the morning.  Likely can restart without boluses in the evening tomorrow night if no bleeding issues  I updated the patient's wife yesterday regarding the operative plan and postop plan.                  Physical Exam:      Last Vital Signs:  /77 (BP Location: Left arm)   Pulse 68   Temp 98.4  F (36.9  C) (Temporal)   Resp 16   Ht 1.854 m (6' 1\")   Wt " 142 kg (313 lb 1.6 oz)   SpO2 93%   BMI 41.31 kg/m      General: Awake, alert, NAD  HEENT: Normocephalic and atraumatic, eyes anicteric and without scleral injection, EOMI, face symmetric, MMM.  Cardiac: RRR, normal S1, S2. No m/g/r, no LE edema.  Pulmonary: Normal chest rise, normal work of breathing.  Lungs CTAB without crackles or wheezing.  Abdomen: soft, non-tender, non-distended.  Normoactive bowel sounds, no guarding or rebound tenderness.  Extremities: no deformities.  Warm, well perfused.  Skin: no rashes or lesions.  Warm and Dry.  Wound vac in place with ACE wrap from left knee to ankle, drain also present.  Neuro: No focal deficits.  Speech clear.  Coordination and strength grossly normal.  Psych: Alert and oriented x3. Appropriate affect.         Medications:      All current medications were reviewed with changes reflected in problem list.         Data:      All new lab and imaging data was reviewed.   Labs:  Recent Labs   Lab 02/01/21  0641 01/29/21  0640 01/28/21  0628    140 140   POTASSIUM 4.8 4.0 4.3   CHLORIDE 105 106 106   CO2 31 37* 35*   ANIONGAP 3 <1* <1*   GLC 90 89 97   BUN 21 21 17   CR 1.11 1.19 1.19   GFRESTIMATED 70 64 64   GFRESTBLACK 81 74 74   CATERINA 9.0 8.7 8.7     Recent Labs   Lab 02/01/21  0641 01/29/21  0640 01/28/21  1329 01/28/21  0628   WBC 6.8 9.2  --  8.7   HGB 11.9* 11.4* 11.6* 11.3*   HCT 38.5* 37.1*  --  36.1*    101*  --  100    259  --  239     Recent Labs   Lab 01/28/21  0628 01/27/21  0559   INR 1.17* 1.36*      Imaging:   No results found for this or any previous visit (from the past 24 hour(s)).    Maurice Patton MD

## 2021-02-02 NOTE — PLAN OF CARE
Alert and oriented, forgetful. Tolerating regular diet. Transfers with Ax1, gait belt, surgical boot, and walker. Dressing CDI, RLE elevated on blue foam wedge pillow. Voiding in adequate amounts. JOHN to bulb suction, tubing stripped at 1400. Wound vac set at 75 mmHg NPWT. Pain managed with scheduled Tylenol and prn Oxycodone. Midline patent, on Heparin gtts at 12 mL/hr. Heparin gtts to stop at 0330 tomorrow AM, NPO after midnight for procedure in AM.

## 2021-02-02 NOTE — PLAN OF CARE
A&O x4. VSS. LS CTA all fields. BS active x4. UP with A1 and walker orders OK to WBAT with boot. Dressing is CDI. CMS intact.  Wound vac on at 75mmHg continuous. JOHN drain with scant output. Pain controlled with scheduled tylenol and lyrica Voiding. Regular diet. Plastics to re-evaluate for surgery on 2/3.

## 2021-02-03 ENCOUNTER — ANESTHESIA EVENT (OUTPATIENT)
Dept: SURGERY | Facility: CLINIC | Age: 66
End: 2021-02-03
Payer: COMMERCIAL

## 2021-02-03 ENCOUNTER — ANESTHESIA (OUTPATIENT)
Dept: SURGERY | Facility: CLINIC | Age: 66
End: 2021-02-03
Payer: COMMERCIAL

## 2021-02-03 LAB
INR PPP: 0.92 (ref 0.86–1.14)
UFH PPP CHRO-ACNC: <0.1 IU/ML

## 2021-02-03 PROCEDURE — 710N000009 HC RECOVERY PHASE 1, LEVEL 1, PER MIN: Performed by: STUDENT IN AN ORGANIZED HEALTH CARE EDUCATION/TRAINING PROGRAM

## 2021-02-03 PROCEDURE — 272N000001 HC OR GENERAL SUPPLY STERILE: Performed by: STUDENT IN AN ORGANIZED HEALTH CARE EDUCATION/TRAINING PROGRAM

## 2021-02-03 PROCEDURE — 99233 SBSQ HOSP IP/OBS HIGH 50: CPT | Performed by: HOSPITALIST

## 2021-02-03 PROCEDURE — 250N000011 HC RX IP 250 OP 636: Performed by: ANESTHESIOLOGY

## 2021-02-03 PROCEDURE — 370N000017 HC ANESTHESIA TECHNICAL FEE, PER MIN: Performed by: STUDENT IN AN ORGANIZED HEALTH CARE EDUCATION/TRAINING PROGRAM

## 2021-02-03 PROCEDURE — 250N000011 HC RX IP 250 OP 636: Performed by: STUDENT IN AN ORGANIZED HEALTH CARE EDUCATION/TRAINING PROGRAM

## 2021-02-03 PROCEDURE — 250N000013 HC RX MED GY IP 250 OP 250 PS 637: Performed by: INTERNAL MEDICINE

## 2021-02-03 PROCEDURE — 0HRLX74 REPLACEMENT OF LEFT LOWER LEG SKIN WITH AUTOLOGOUS TISSUE SUBSTITUTE, PARTIAL THICKNESS, EXTERNAL APPROACH: ICD-10-PCS | Performed by: STUDENT IN AN ORGANIZED HEALTH CARE EDUCATION/TRAINING PROGRAM

## 2021-02-03 PROCEDURE — 250N000013 HC RX MED GY IP 250 OP 250 PS 637: Performed by: STUDENT IN AN ORGANIZED HEALTH CARE EDUCATION/TRAINING PROGRAM

## 2021-02-03 PROCEDURE — 85610 PROTHROMBIN TIME: CPT | Performed by: HOSPITALIST

## 2021-02-03 PROCEDURE — 250N000013 HC RX MED GY IP 250 OP 250 PS 637: Performed by: HOSPITALIST

## 2021-02-03 PROCEDURE — 120N000001 HC R&B MED SURG/OB

## 2021-02-03 PROCEDURE — 0HBJXZZ EXCISION OF LEFT UPPER LEG SKIN, EXTERNAL APPROACH: ICD-10-PCS | Performed by: STUDENT IN AN ORGANIZED HEALTH CARE EDUCATION/TRAINING PROGRAM

## 2021-02-03 PROCEDURE — 999N000141 HC STATISTIC PRE-PROCEDURE NURSING ASSESSMENT: Performed by: STUDENT IN AN ORGANIZED HEALTH CARE EDUCATION/TRAINING PROGRAM

## 2021-02-03 PROCEDURE — 85520 HEPARIN ASSAY: CPT | Performed by: HOSPITALIST

## 2021-02-03 PROCEDURE — 258N000003 HC RX IP 258 OP 636: Performed by: ANESTHESIOLOGY

## 2021-02-03 PROCEDURE — 250N000009 HC RX 250: Performed by: STUDENT IN AN ORGANIZED HEALTH CARE EDUCATION/TRAINING PROGRAM

## 2021-02-03 PROCEDURE — 360N000074 HC SURGERY LEVEL 1, PER MIN: Performed by: STUDENT IN AN ORGANIZED HEALTH CARE EDUCATION/TRAINING PROGRAM

## 2021-02-03 PROCEDURE — 250N000011 HC RX IP 250 OP 636: Performed by: HOSPITALIST

## 2021-02-03 PROCEDURE — 250N000011 HC RX IP 250 OP 636: Performed by: NURSE ANESTHETIST, CERTIFIED REGISTERED

## 2021-02-03 PROCEDURE — 250N000009 HC RX 250: Performed by: NURSE ANESTHETIST, CERTIFIED REGISTERED

## 2021-02-03 RX ORDER — FENTANYL CITRATE 50 UG/ML
25-50 INJECTION, SOLUTION INTRAMUSCULAR; INTRAVENOUS
Status: DISCONTINUED | OUTPATIENT
Start: 2021-02-03 | End: 2021-02-03 | Stop reason: HOSPADM

## 2021-02-03 RX ORDER — GLYCOPYRROLATE 0.2 MG/ML
INJECTION, SOLUTION INTRAMUSCULAR; INTRAVENOUS PRN
Status: DISCONTINUED | OUTPATIENT
Start: 2021-02-03 | End: 2021-02-03

## 2021-02-03 RX ORDER — NALOXONE HYDROCHLORIDE 0.4 MG/ML
0.4 INJECTION, SOLUTION INTRAMUSCULAR; INTRAVENOUS; SUBCUTANEOUS
Status: DISCONTINUED | OUTPATIENT
Start: 2021-02-03 | End: 2021-02-03 | Stop reason: HOSPADM

## 2021-02-03 RX ORDER — HEPARIN SODIUM 10000 [USP'U]/100ML
0-5000 INJECTION, SOLUTION INTRAVENOUS CONTINUOUS
Status: DISCONTINUED | OUTPATIENT
Start: 2021-02-03 | End: 2021-02-03 | Stop reason: DRUGHIGH

## 2021-02-03 RX ORDER — HEPARIN SODIUM 10000 [USP'U]/100ML
0-5000 INJECTION, SOLUTION INTRAVENOUS CONTINUOUS
Status: DISCONTINUED | OUTPATIENT
Start: 2021-02-03 | End: 2021-02-05

## 2021-02-03 RX ORDER — HEPARIN SODIUM 10000 [USP'U]/100ML
0-5000 INJECTION, SOLUTION INTRAVENOUS CONTINUOUS
Status: DISCONTINUED | OUTPATIENT
Start: 2021-02-03 | End: 2021-02-03

## 2021-02-03 RX ORDER — DEXAMETHASONE SODIUM PHOSPHATE 4 MG/ML
INJECTION, SOLUTION INTRA-ARTICULAR; INTRALESIONAL; INTRAMUSCULAR; INTRAVENOUS; SOFT TISSUE PRN
Status: DISCONTINUED | OUTPATIENT
Start: 2021-02-03 | End: 2021-02-03

## 2021-02-03 RX ORDER — NALOXONE HYDROCHLORIDE 0.4 MG/ML
0.2 INJECTION, SOLUTION INTRAMUSCULAR; INTRAVENOUS; SUBCUTANEOUS
Status: DISCONTINUED | OUTPATIENT
Start: 2021-02-03 | End: 2021-02-03 | Stop reason: HOSPADM

## 2021-02-03 RX ORDER — WARFARIN SODIUM 5 MG/1
5 TABLET ORAL
Status: COMPLETED | OUTPATIENT
Start: 2021-02-03 | End: 2021-02-03

## 2021-02-03 RX ORDER — LABETALOL HYDROCHLORIDE 5 MG/ML
10 INJECTION, SOLUTION INTRAVENOUS
Status: DISCONTINUED | OUTPATIENT
Start: 2021-02-03 | End: 2021-02-03 | Stop reason: HOSPADM

## 2021-02-03 RX ORDER — SODIUM CHLORIDE, SODIUM LACTATE, POTASSIUM CHLORIDE, CALCIUM CHLORIDE 600; 310; 30; 20 MG/100ML; MG/100ML; MG/100ML; MG/100ML
INJECTION, SOLUTION INTRAVENOUS CONTINUOUS
Status: DISCONTINUED | OUTPATIENT
Start: 2021-02-03 | End: 2021-02-03 | Stop reason: HOSPADM

## 2021-02-03 RX ORDER — MEPERIDINE HYDROCHLORIDE 25 MG/ML
12.5 INJECTION INTRAMUSCULAR; INTRAVENOUS; SUBCUTANEOUS
Status: DISCONTINUED | OUTPATIENT
Start: 2021-02-03 | End: 2021-02-03 | Stop reason: HOSPADM

## 2021-02-03 RX ORDER — HYDROMORPHONE HYDROCHLORIDE 1 MG/ML
.3-.5 INJECTION, SOLUTION INTRAMUSCULAR; INTRAVENOUS; SUBCUTANEOUS EVERY 10 MIN PRN
Status: DISCONTINUED | OUTPATIENT
Start: 2021-02-03 | End: 2021-02-03 | Stop reason: HOSPADM

## 2021-02-03 RX ORDER — MAGNESIUM HYDROXIDE 1200 MG/15ML
LIQUID ORAL PRN
Status: DISCONTINUED | OUTPATIENT
Start: 2021-02-03 | End: 2021-02-03 | Stop reason: HOSPADM

## 2021-02-03 RX ORDER — ONDANSETRON 2 MG/ML
4 INJECTION INTRAMUSCULAR; INTRAVENOUS EVERY 30 MIN PRN
Status: DISCONTINUED | OUTPATIENT
Start: 2021-02-03 | End: 2021-02-03 | Stop reason: HOSPADM

## 2021-02-03 RX ORDER — EPHEDRINE SULFATE 50 MG/ML
INJECTION, SOLUTION INTRAVENOUS PRN
Status: DISCONTINUED | OUTPATIENT
Start: 2021-02-03 | End: 2021-02-03

## 2021-02-03 RX ORDER — MINERAL OIL
OIL (ML) MISCELLANEOUS PRN
Status: DISCONTINUED | OUTPATIENT
Start: 2021-02-03 | End: 2021-02-03 | Stop reason: HOSPADM

## 2021-02-03 RX ORDER — ONDANSETRON 4 MG/1
4 TABLET, ORALLY DISINTEGRATING ORAL EVERY 30 MIN PRN
Status: DISCONTINUED | OUTPATIENT
Start: 2021-02-03 | End: 2021-02-03 | Stop reason: HOSPADM

## 2021-02-03 RX ORDER — CEFAZOLIN SODIUM 1 G/3ML
1 INJECTION, POWDER, FOR SOLUTION INTRAMUSCULAR; INTRAVENOUS SEE ADMIN INSTRUCTIONS
Status: DISCONTINUED | OUTPATIENT
Start: 2021-02-03 | End: 2021-02-03 | Stop reason: HOSPADM

## 2021-02-03 RX ORDER — CEFAZOLIN SODIUM IN 0.9 % NACL 3 G/100 ML
3 INTRAVENOUS SOLUTION, PIGGYBACK (ML) INTRAVENOUS
Status: COMPLETED | OUTPATIENT
Start: 2021-02-03 | End: 2021-02-03

## 2021-02-03 RX ORDER — PROPOFOL 10 MG/ML
INJECTION, EMULSION INTRAVENOUS PRN
Status: DISCONTINUED | OUTPATIENT
Start: 2021-02-03 | End: 2021-02-03

## 2021-02-03 RX ADMIN — PREGABALIN 100 MG: 100 CAPSULE ORAL at 20:19

## 2021-02-03 RX ADMIN — LIOTHYRONINE SODIUM 25 MCG: 25 TABLET ORAL at 05:47

## 2021-02-03 RX ADMIN — LIDOCAINE HYDROCHLORIDE 50 MG: 10 INJECTION, SOLUTION INFILTRATION; PERINEURAL at 07:38

## 2021-02-03 RX ADMIN — EPHEDRINE SULFATE 10 MG: 50 INJECTION, SOLUTION INTRAVENOUS at 07:58

## 2021-02-03 RX ADMIN — PROPAFENONE HYDROCHLORIDE 150 MG: 150 TABLET, FILM COATED ORAL at 05:47

## 2021-02-03 RX ADMIN — LEVOTHYROXINE SODIUM 150 MCG: 150 TABLET ORAL at 05:47

## 2021-02-03 RX ADMIN — MEMANTINE 10 MG: 5 TABLET ORAL at 20:19

## 2021-02-03 RX ADMIN — ACETAMINOPHEN 1000 MG: 500 TABLET, FILM COATED ORAL at 20:19

## 2021-02-03 RX ADMIN — FENTANYL CITRATE 50 MCG: 50 INJECTION, SOLUTION INTRAMUSCULAR; INTRAVENOUS at 09:34

## 2021-02-03 RX ADMIN — GLYCOPYRROLATE 0.2 MG: 0.2 INJECTION, SOLUTION INTRAMUSCULAR; INTRAVENOUS at 07:38

## 2021-02-03 RX ADMIN — DEXAMETHASONE SODIUM PHOSPHATE 4 MG: 4 INJECTION, SOLUTION INTRA-ARTICULAR; INTRALESIONAL; INTRAMUSCULAR; INTRAVENOUS; SOFT TISSUE at 07:38

## 2021-02-03 RX ADMIN — PROPAFENONE HYDROCHLORIDE 150 MG: 150 TABLET, FILM COATED ORAL at 13:28

## 2021-02-03 RX ADMIN — GUANFACINE 1 MG: 1 TABLET ORAL at 20:35

## 2021-02-03 RX ADMIN — DONEPEZIL HYDROCHLORIDE 20 MG: 10 TABLET ORAL at 20:36

## 2021-02-03 RX ADMIN — PROPAFENONE HYDROCHLORIDE 150 MG: 150 TABLET, FILM COATED ORAL at 20:36

## 2021-02-03 RX ADMIN — PROPOFOL 200 MG: 10 INJECTION, EMULSION INTRAVENOUS at 07:38

## 2021-02-03 RX ADMIN — WARFARIN SODIUM 5 MG: 5 TABLET ORAL at 18:22

## 2021-02-03 RX ADMIN — SIMVASTATIN 40 MG: 40 TABLET, FILM COATED ORAL at 20:35

## 2021-02-03 RX ADMIN — ACETAMINOPHEN 1000 MG: 500 TABLET, FILM COATED ORAL at 13:28

## 2021-02-03 RX ADMIN — SODIUM CHLORIDE, POTASSIUM CHLORIDE, SODIUM LACTATE AND CALCIUM CHLORIDE: 600; 310; 30; 20 INJECTION, SOLUTION INTRAVENOUS at 07:25

## 2021-02-03 RX ADMIN — PREGABALIN 100 MG: 100 CAPSULE ORAL at 13:28

## 2021-02-03 RX ADMIN — OXYCODONE HYDROCHLORIDE 5 MG: 5 TABLET ORAL at 20:19

## 2021-02-03 RX ADMIN — Medication 3 G: at 07:54

## 2021-02-03 RX ADMIN — EPHEDRINE SULFATE 10 MG: 50 INJECTION, SOLUTION INTRAVENOUS at 07:52

## 2021-02-03 RX ADMIN — DOCUSATE SODIUM 100 MG: 100 CAPSULE, LIQUID FILLED ORAL at 20:19

## 2021-02-03 RX ADMIN — FENTANYL CITRATE 100 MCG: 50 INJECTION, SOLUTION INTRAMUSCULAR; INTRAVENOUS at 07:38

## 2021-02-03 RX ADMIN — ONDANSETRON 4 MG: 2 INJECTION INTRAMUSCULAR; INTRAVENOUS at 07:38

## 2021-02-03 RX ADMIN — FENTANYL CITRATE 50 MCG: 50 INJECTION, SOLUTION INTRAMUSCULAR; INTRAVENOUS at 10:24

## 2021-02-03 RX ADMIN — HEPARIN SODIUM 1200 UNITS/HR: 10000 INJECTION, SOLUTION INTRAVENOUS at 18:23

## 2021-02-03 RX ADMIN — HYDROMORPHONE HYDROCHLORIDE 0.5 MG: 1 INJECTION, SOLUTION INTRAMUSCULAR; INTRAVENOUS; SUBCUTANEOUS at 08:58

## 2021-02-03 ASSESSMENT — ACTIVITIES OF DAILY LIVING (ADL)
ADLS_ACUITY_SCORE: 20
ADLS_ACUITY_SCORE: 19
ADLS_ACUITY_SCORE: 20
ADLS_ACUITY_SCORE: 20
ADLS_ACUITY_SCORE: 19

## 2021-02-03 ASSESSMENT — COPD QUESTIONNAIRES: COPD: 0

## 2021-02-03 ASSESSMENT — ENCOUNTER SYMPTOMS
DYSRHYTHMIAS: 1
SEIZURES: 1

## 2021-02-03 NOTE — PLAN OF CARE
A&O x4. VSS. LS CTA. BS active, BM this shift. Voiding via urinal. Up with A1 with walker, surgical shoe, and GB- WBAT. NPO since midnight. Jean wipes performed. JPstripped with unmeasurable output. Wound vac on at 75mmHg countinuous. Baseline neuropathy otherwise CMS intact. Dressing to LLE is CDI. Midline patent. Report given to Viraj in PACU for wound closure.

## 2021-02-03 NOTE — ANESTHESIA PREPROCEDURE EVALUATION
Anesthesia Pre-Procedure Evaluation    Patient: Todd S Aschoff   MRN: [id number] : 1955        Preoperative Diagnosis: Wound of left leg [S81.821T]   Procedure : Procedure(s):  LEFT LOWER EXTREMITY POSSIBLE SKIN GRAFT, POSSIBLE CLOSURE     Past Medical History:   Diagnosis Date     Alcohol dependence (H)      Allergy, unspecified not elsewhere classified     seasonal     Antiplatelet or antithrombotic long-term use     coumadin/lovenox     Aortic valve prosthesis present      Atrial fibrillation (H)      Blood transfusion     after heart surg      BPH (benign prostatic hypertrophy)      Chronic infection     low back wound incision not healing      Chronic pain     lower back and right leg and left leg     Coagulation disorder (H)     on blood thinners     Dissection of aorta, thoracic (H)     St Jose F aotic valve + arch graft      Headache(784.0)      Heart murmur     aortic valve replaced and arch     History of spinal cord injury      Low back pain      Major depression      Mixed hyperlipidemia      Numbness and tingling     right leg post surg rightt hip/ also left leg since surg     OA (osteoarthritis)     hips     Obesity, unspecified      Prostate infection      Sciatica     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 Jose F's valve     APPLY WOUND VAC Left 2021    Procedure: Placement of negative pressure wound therapy 2,400 squared centimeters  ;  Surgeon: Lazaro Plascencia MD;  Location: [facility] OR     C TOTAL HIP ARTHROPLASTY  2010    Left AMANDA     C TOTAL HIP ARTHROPLASTY  2002    R hip replacement comp's by nerve injury with pain down into R leg, nadya below knee     CATARACT IOL, RT/LT      rt eye only     CHOLECYSTECTOMY, LAPOROSCOPIC  8/10     COLONOSCOPY N/A 1/15/2015    Procedure: COLONOSCOPY;  Surgeon: Johnson Kothari MD;  Location:  GI      DECOMPRESSION LUMBAR ONE LEVEL  4/3/2013    Procedure: DECOMPRESSION LUMBAR ONE LEVEL;  Open Decompression L3-4 bilateral;  Surgeon: Khalif Coffey MD;  Location: [facility] OR     DECOMPRESSION, FUSION CERVICAL ANTERIOR ONE LEVEL, COMBINED  3/23/2012    Procedure:COMBINED DECOMPRESSION, FUSION CERVICAL ANTERIOR ONE LEVEL; Anterior Cervical Decompression and Fusion C4-6; Surgeon:KHALIF COFFEY; Location:[facility] OR     EXPLORE SPINE, REMOVE HARDWARE, COMBINED  5/23/2013    Procedure: COMBINED EXPLORE SPINE, REMOVE HARDWARE;  Exploration Lumbar Wound for fluid collection;  Surgeon: Khalif Coffey MD;  Location: [facility] OR     FUSION CERVICAL ANTERIOR TWO LEVELS  3/26/2012    Procedure:FUSION CERVICAL ANTERIOR TWO LEVELS; Anterior Cervical Fusion C4-6, Anterior Cervical  Hematoma Evacuation; Surgeon:KHALIF COFFEY; Location:[facility] OR     IRRIGATION AND DEBRIDEMENT LOWER EXTREMITY, COMBINED Left 1/10/2021    Procedure: 1.  Irrigation and excisional debridement to muscle left distal medial calf chronic wounds total area measuring 9 cm x 4 cm 2.  Irrigation and excisional debridement to fascia left medial calf chronic hematoma measuring 29 x 6.7 x 4.2 cm 3.  Primary complex wound closure left medial distal calf 9 cm in length;  Surgeon: Rod Kemp MD;  Location: [facility] OR     IRRIGATION AND DEBRIDEMENT LOWER EXTREMITY, COMBINED Left 1/26/2021    Procedure: Evacuation of hematoma debridement of skin, subcutaneous tissue and muscle 2,400 squared centimeters, placement of negative pressure wound therapy 2,400 squared centimeters;  Surgeon: Lazaro Plascencia MD;  Location: [facility] OR     IRRIGATION AND DEBRIDEMENT SPINE, CLOSE WOUND, COMBINED  3/26/2012    Procedure:COMBINED IRRIGATION AND DEBRIDEMENT SPINE, CLOSE WOUND; Surgeon:KHALIF COFFEY; Location:[facility] OR     removal of cyst of back   2.5week     TONSILLECTOMY       wisdom teeth[       ZZC NONSPECIFIC PROCEDURE  1992    repair of TAA with graft      Allergies    Allergen Reactions     Gabapentin      Severe behavioral disturbances      Social History     Tobacco Use     Smoking status: Current Every Day Smoker     Packs/day: 0.00     Years: 3.00     Pack years: 0.00     Types: Clove cigarettes or kreteks, Pipe, Cigars     Smokeless tobacco: Never Used   Substance Use Topics     Alcohol use: No     Comment: Stopped drinking alcohol ~2009      Wt Readings from Last 1 Encounters:   02/01/21 142 kg (313 lb 1.6 oz)        Anesthesia Evaluation   Pt has had prior anesthetic. Type: General.    No history of anesthetic complications       ROS/MED HX  ENT/Pulmonary:  - neg pulmonary ROS   (+) ROSALIE risk factors, obese,  (-) asthma, COPD and recent URI   Neurologic:     (+) dementia, seizures, CVA, date: silent, without deficits,     Cardiovascular: Comment: S/p AVR aortic graft - neg cardiovascular ROS   (+) -----Taking blood thinners dysrhythmias, a-fib, valvular problems/murmurs Previous cardiac testing   Echo: Date: 1/21 Results:  Left ventricular systolic function is normal.  The visual ejection fraction is estimated at 55-60%.  S/P mechanical aortic valve replacement with conduit graft graft  The anterior portion of aortic root appears thickened, unchanged when directly  compared to prior study from 2018.  The prosthetic aortic valve and leaflets are not well visualized.  The gradient is normal for this prosthetic aortic valve. (Mean gradient 11mm)  The study was technically difficult.  Stress Test: Date: Results:    ECG Reviewed: Date: 1/21 Results:  Sinus.  1st degree AVB.  Prolonged QT.  Cath: Date: Results:   (-) hypertension   METS/Exercise Tolerance:     Hematologic: Comments: Lab Test        02/01/21 01/29/21 01/28/21 01/28/21 01/27/21 01/27/21 01/26/21                       0641          0640          1329          0628          0559          0559          0623          WBC          6.8          9.2           --          8.7           --           7.7           --           HGB          11.9*        11.4*        11.6*        11.3*          < >        11.5*         --           MCV          100          101*          --          100           --          98            --           PLT          247          259           --          239           --          234           --           INR           --           --           --          1.17*         --          1.36*        1.62*          < > = values in this interval not displayed.                  Lab Test        02/01/21 01/29/21 01/28/21                       0641          0640          0628          NA           139          140          140           POTASSIUM    4.8          4.0          4.3           CHLORIDE     105          106          106           CO2          31           37*          35*           BUN          21 21           17            CR           1.11         1.19         1.19          ANIONGAP     3            <1*          <1*           CATERINA          9.0          8.7          8.7           GLC          90           89           97           - neg hematologic  ROS     Musculoskeletal:       GI/Hepatic:  - neg GI/hepatic ROS     Renal/Genitourinary:     (+) renal disease, type: CRI, Pt does not require dialysis,     Endo:     (+) thyroid problem, hypothyroidism, Obesity,  (-) Type I DM, Type II DM and chronic steroid usage   Psychiatric/Substance Use:     (+) psychiatric history depression     Infectious Disease:     (+) MRSA,     Malignancy:  - neg malignancy ROS     Other:  - neg other ROS    (+) , H/O Chronic Pain,        Physical Exam    Airway  airway exam normal      Mallampati: II   TM distance: > 3 FB   Neck ROM: full   Mouth opening: > 3 cm    Respiratory Devices and Support         Dental  no notable dental history         Cardiovascular   cardiovascular exam normal          Pulmonary   pulmonary exam normal                OUTSIDE LABS:  CBC:   Lab Results    Component Value Date    WBC 6.8 02/01/2021    WBC 9.2 01/29/2021    HGB 11.9 (L) 02/01/2021    HGB 11.4 (L) 01/29/2021    HCT 38.5 (L) 02/01/2021    HCT 37.1 (L) 01/29/2021     02/01/2021     01/29/2021     BMP:   Lab Results   Component Value Date     02/01/2021     01/29/2021    POTASSIUM 4.8 02/01/2021    POTASSIUM 4.0 01/29/2021    CHLORIDE 105 02/01/2021    CHLORIDE 106 01/29/2021    CO2 31 02/01/2021    CO2 37 (H) 01/29/2021    BUN 21 02/01/2021    BUN 21 01/29/2021    CR 1.11 02/01/2021    CR 1.19 01/29/2021    GLC 90 02/01/2021    GLC 89 01/29/2021     COAGS:   Lab Results   Component Value Date    PTT 39 (H) 01/25/2021    INR 1.17 (H) 01/28/2021     POC:   Lab Results   Component Value Date     (H) 04/04/2013     HEPATIC:   Lab Results   Component Value Date    ALBUMIN 3.2 (L) 01/22/2021    PROTTOTAL 7.2 01/22/2021    ALT 24 01/22/2021    AST 25 01/22/2021    ALKPHOS 106 01/22/2021    BILITOTAL 0.9 01/22/2021     OTHER:   Lab Results   Component Value Date    LACT 1.4 01/22/2021    A1C 4.9 01/23/2021    CATERINA 9.0 02/01/2021    PHOS 2.6 08/14/2010    MAG 2.0 01/22/2021    LIPASE 22 08/10/2010    TSH 2.17 11/30/2020    T4 0.87 10/29/2019    CRP 2.9 11/27/2020    SED 30 (H) 04/18/2013       Anesthesia Plan     History & Physical Review      ASA Status:  3. NPO Status:  NPO Appropriate. .  Plan for General with Intravenous induction. Device: LMA Maintenance will be Balanced.         PONV prophylaxis:  Ondansetron (or other 5HT-3) and Dexamethasone or Solumedrol.       Consents  Anesthesia Plan(s) and associated risks, benefits, and realistic alternatives discussed.    Questions answered and patient/representative(s) expressed understanding.    Discussed with:  Patient.             Postoperative Care  Postoperative pain management: IV analgesics.           Juan Brown MD

## 2021-02-03 NOTE — BRIEF OP NOTE
[facility]    Brief Operative Note    Pre-operative diagnosis: Wound of left leg [S89.882J]  Post-operative diagnosis Same as pre-operative diagnosis    Procedure: Procedure(s):  LEFT LOWER EXTREMITY  SKIN GRAFT,  CLOSURE PROXIMAL WOUND, WOUND VAC PLACEMENT  Surgeon: Surgeon(s) and Role:     * Lazaro Plascencia MD - Primary  Anesthesia: General   Estimated blood loss: Less than 10 ml  Drains: None  Specimens: * No specimens in log *  Findings:   None.  Complications: None.  Implants: * No implants in log *

## 2021-02-03 NOTE — PLAN OF CARE
End of Shift Summary.  For vital signs and complete assessments, please see documentation flowsheets.     Pertinent assessments: A/O X 4 activity as tolerated. Lungs clear, UOp adequate voiding in urinal. Minimal pain. LLE wrapped with ace, and wound vac in place on left shin to prevana. Midline IV Right upper arm.  Major Shift Events: Surgery today for skin graft to shin. Dilaudid for pain control. TO start IV heparin infusing tonight at 1800. VSS  Plan (Upcoming Events): post op management. Increase activity as tolerated. Pain control.   Discharge/Transfer Needs: unknown.     Bedside Shift Report Completed   Bedside Safety Check Completed

## 2021-02-03 NOTE — ANESTHESIA CARE TRANSFER NOTE
Patient: Nicko SHINE Aschoff    Procedure(s):  LEFT LOWER EXTREMITY POSSIBLE SKIN GRAFT, POSSIBLE CLOSURE    Diagnosis: Wound of left leg [S81.205Y]  Diagnosis Additional Information: No value filed.    Anesthesia Type:   General     Note:    Oropharynx: oropharynx clear of all foreign objects  Level of Consciousness: awake  Oxygen Supplementation: face mask  Level of Supplemental Oxygen: 4  Independent Airway: airway patency satisfactory and stable  Dentition: dentition unchanged  Vital Signs Stable: post-procedure vital signs reviewed and stable  Report to RN Given: handoff report given  Patient transferred to: PACU    Handoff Report: Identifed the Patient, Identified the Reponsible Provider, Reviewed the pertinent medical history, Discussed the surgical course, Reviewed Intra-OP anesthesia mangement and issues during anesthesia, Set expectations for post-procedure period and Allowed opportunity for questions and acknowledgement of understanding      Vitals: (Last set prior to Anesthesia Care Transfer)  CRNA VITALS  2/3/2021 0813 - 2/3/2021 0856      2/3/2021             Pulse:  83    SpO2:  100 %        Electronically Signed By: AMARIS Dickens CRNA  February 3, 2021  8:56 AM

## 2021-02-03 NOTE — PLAN OF CARE
"/67 (BP Location: Left arm)   Pulse 72   Temp 96.2  F (35.7  C) (Temporal)   Resp 16   Ht 1.854 m (6' 1\")   Wt 142 kg (313 lb 1.6 oz)   SpO2 98%   BMI 41.31 kg/m    NEURO: A/Ox4, forgetful at times  PAIN:C/O 7/10 pain to LLE- PRN oxy given x1  IV:Hep gtt infusing @ 1200/ hr until 0330 tomorrow when gtt will be turned off  RESPIRATORY:LS- clear/ diminished, denies SOB  GI:+BS, last BM 1/30, pt passing gas, PRN miralax given on days, denies nausea  :Urinal voiding appropriately  SKIN:LLE wound, drsng in place, wound vac in place with 50 ml output, JOHN drain in place with little to no output this shift  ACTIVITY:Ax1 and walker, LLE elevated, boot on left foot when OOB  DIET:Regular diet, NPO at midnight  PLAN: Plastic surgery following, plan for surgery in AM for grafting, pain control, continue hep gtt until 0330, continue POC.   "

## 2021-02-03 NOTE — ANESTHESIA POSTPROCEDURE EVALUATION
Patient: Todd S Aschoff    Procedure(s):  LEFT LOWER EXTREMITY  SKIN GRAFT,  CLOSURE PROXIMAL WOUND, WOUND VAC PLACEMENT    Diagnosis:Wound of left leg [S84.743O]  Diagnosis Additional Information: No value filed.    Anesthesia Type:  General    Note:  Disposition: Inpatient   Postop Pain Control: Uneventful            Sign Out: Well controlled pain   PONV:    Neuro/Psych: Uneventful            Sign Out: Acceptable/Baseline neuro status   Airway/Respiratory: Uneventful            Sign Out: Acceptable/Baseline resp. status   CV/Hemodynamics: Uneventful            Sign Out: Acceptable CV status   Other NRE:    DID A NON-ROUTINE EVENT OCCUR?          Last vitals:  CRNA VITALS  2/3/2021 0813 - 2/3/2021 0913      2/3/2021             Pulse:  83    SpO2:  100 %          Electronically Signed By: Juan Brown MD  February 3, 2021  11:13 AM

## 2021-02-03 NOTE — OP NOTE
Pre-operative diagnosis: Left Leg Necrotic Wound   Post-operative diagnosis: Left Leg Wound   Procedure:   Split Thickness Skin Graft to Leg of 18 square centimeters   Intermediate Closure of Leg of 8cm      Surgeon: Temo   Assistant(s): NONE     Anesthesia: general    Estimated blood loss:  Complications: 5 ml  IV Fluids: 900 mL  none   Specimens: * No specimens in log *               INDICATION FOR PROCEDURE: This is a 65 year old male with a left lower extremity wound status post hematoma washout and closure.  Plastic surgery was consulted for management and reconstruction.  On Jan 26 2021 I washed out the hematoma which was 1400mL and then placed a vac in preparation for future closure versus skin grafting. After VAC therapy he appeared ready for definitive closure versus skin grafting. We discussed operative details of the procedure including risks and benefits, and the patient agreed to proceed.      DESCRIPTION OF PROCEDURE:  Prior to surgery, I met with the patient to perform the markings in the presence of a chaperone.  Patient was taken to the operating room.  The patient was placed on the table in supine position.  General endotracheal anesthesia was induced and the left lower extremity was prepped and draped in standard sterile fashion.  Stockinette it was placed on the left foot.       The VAC dressing was removed. The both the proximal and distal wounds appeared healthy and after removal of the underlying JOHN Drain I assessed the softness of the calf and lower extremity. It appeared there was no longer much induration or edema and the tissue planes responded normally as I carefully undermined with cautery. I then closed the proximal wound in a linear fashion which ended up being a total of 8cm long. I then irrigated the distal wound with saline and removed any minor areas of deviatalized tissue along the periphery. A split thickness skin graft was harvested from the ipsilateral thigh (8mm) thick and  meshed at 1.5:1 and tailored and secured to the distal wound with staples. A negative pressure incisional management device was placed covering  Both wounds. There was no leak on the VAC and there was no shelbi blood in the drain or VAC.    The patient tolerated the procedure well.  Sponge and instrument counts were correct.     Drains -NONE    VAC: YES  FINDINGS: as expected     Lazaro Plascencia MD

## 2021-02-03 NOTE — ANESTHESIA PROCEDURE NOTES
Airway   Date/Time: 2/3/2021 7:40 AM   Patient location during procedure: OR  Staff -   CRNA: Airam Stone APRN CRNA  Performed By: CRNA    Indications and Patient Condition  Indications for airway management: alejandra-procedural  Induction type:intravenousMask difficulty assessment: 0 - not attempted    Final Airway Details  Final airway type: supraglottic airway    Endotracheal Airway Details   Secured with: plastic tape    Post intubation assessment   Placement verified by: capnometry and chest rise   Number of attempts at approach: 1  Secured with:plastic tape  Ease of procedure: easy  Dentition: Unchanged

## 2021-02-03 NOTE — PROGRESS NOTES
[facility]    Hospitalist Progress Note  Name: Todd S Aschoff    MRN: [id number]  Provider:  Aurelio Etienne DO, MPH  Date of Service: 02/03/2021    Summary of Stay: Todd S Aschoff is a 65 year old male with past medical history of aortic dissection status post mechanical aortic valve plus graft (1992), atrial fibrillation (chronic anticoagulation with Coumadin), chronic pain, dementia and depression who was hospitalized 1/9-1/13 here for left leg wound with drainage (initially caused by a fall approximately 1 month prior) which was found to be infected hematoma requiring I&D (1/10) and ultimately discharged home on oral antibiotics who was admitted on 1/22/2021 with generalized weakness, confusion and hypotension.  Patient was found to have acute CVA but no longer has any focal deficits.  Also likely had a seizure prior to admission.       Patient underwent surgery with plastics on 1/26 with removal of 1400 mL of hematoma (no active bleeding) and placement of wound vac.  Wound improving so wound vac changed at the bedside on 1/28.  Has been undergoing [facility] wound vac changes, Dr. Plascencia took the patient to the operating room on Wednesday, 2/3 for skin graft.  Plan to keep the patient here for 5 days after his skin graft for wound cares etc.  We will need to restart heparin likely the evening after surgery though defer to Dr. Plascencia if this plan needs to change.     Problem List/Assessment and Plan:      Acute right frontal infarct, suspect this is a silent infarct as he has no neurological deficits: Patient did undergo CT of the head which showed age indeterminate right occipital infarct.  MRI of the brain showed an acute infarct in the right frontal vertex and multiple chronic infarcts in the right occipital lobe and bilateral cerebellar hemispheres.  MRA shows possible diffuse stenosis of the left PCA.  TTE showed no change from prior.  He has no neurological deficits.  -Stroke neurology consulted,  appreciate recommendations  -Continue simvastatin 40 mg daily  -Continue Coumadin as PTA, bridging with heparin perioperatively.  -Follow-up with PCP in 1 to 2 weeks, follow-up with neurology in clinic in 8 weeks     Suspected seizure: Patient's wife describes seizure-like activity prior to the events that led to his hospitalization.  He had a right arm shaking and unresponsiveness.  He does have an abnormal MRI as above which certainly could serve as a seizure focus.  Neurology recommended starting lamotrigine. No further seizure activity.  -Seizure precautions  -General neurology consulted, appreciate recommendations  -Started lamotrigine 25 mg daily on 1/24  -Need to follow-up with general neurology in 1-2 weeks (at that time lamotrigine can be increased to 50 mg daily and EEG can be done) -given the plan to keep the patient here for 5 days after surgery may need to touch base with Neurology prior to discharge to see if the Lamotrigine needs to be increased before his discharge.  -No driving, climbing, tub baths or potential dangerous activity in the next 3 months     Left lower extremity infected hematoma status post I&D on 1/10, nonhealing with associated cellulitis s/p Large hematoma evaluation and placement of wound vac on 1/26.  S/p fall in December 2020.  During his last admission was found to have infected hematoma.  Underwent I&D with Ortho on 1/10.  His wound culture grew E. coli and Enterococcus faecalis.  ID did follow throughout that admission and felt that the pathogen was E. coli.  He was treated with IV ceftriaxone and at time of discharge cefuroxime for an additional 7 days.  He has had dehiscence of the lower wound with surrounding necrosis.  He underwent I&D with evacuation of 1400 mL hematoma (no active bleeding) on 1/26 with placement of wound vac.  Wound improving after this procedure per Dr. Plascencia.  -Orthopedics consulted, appreciate recommendations  -Plastic surgery consulted, appreciate  recommendations  -Wound vac change MWF  -Went back to the OR on 2/3 for skin grafting.  -Completed ceftriaxone 7 days on 1/29/21.  -Wound nurse consulted, appreciate recommendations     Acute toxic/metabolic encephalopathy - Resolved: Presented with diffuse weakness and significant confusion.  He had significant hypotension.  With hydration his mental status did improve.  I suspect his confusion was multifactorial due to likely seizure, hypotension, acute kidney injury.       Acute kidney injury.  Improved.  Baseline creatinine approximately 1.  On admission creatinine was elevated at 1.58, peaked 1.66.  He is urinating.  He was quite hypotensive on admission which could have lead to ATN causing his renal insufficiency.   -No NSAIDs  -BMP in the morning     Left Shoulder Pain: Patient had pain in the left shoulder (fall/seizure PTA as above).  Orthopedics consulted.  He has no fracture but does have left RC tendinitis with AC joint arthrosis and probable loose bodies.  He underwent left shoulder subacromial injection on 2/25 and feels that this has helped his pain.  -Follow-up with [facility] in outpatient setting for further work-up if persistent pain/decreased mobility     Hypotension - Resolved: Patient had rather profound hypotension on presentation.  This improved significantly with fluids.  Blood pressure now is stable.     Chronic anticoagulation with warfarin for mechanical aortic valve and atrial fibrillation. INR was supratherapeutic at 6.9 (1/23).  INR reversed for surgery with Vitamin K.  He was subsequently started on heparin drip in the perioperative setting.  His goal INR level is 2.5-3.5.    -Heparin drip to restart tonight at 6pm without boluses and loading dose.  -Restart coumadin and bridge post-op.  -Possible transition to Lovenox for bridging after all of his surgical procedures are complete     Acute Anemia: Historically Hgb had been normal, decreased to 12 range during his last hospitalization.  This  admission was 12.8 on admission, has decreased to 11.3 but stable over the past few days.  Did have large hematoma evacuated of the LLE on 1/26 as above, no active bleeding.  Iron checked and normal. No indication for transfusion at this time.  -Daily Hgb     Atrial fibrillation.  Resumed the patient on his propafenone.  Anticoagulation plan as above.     Chronic pain.  Patient is on Lyrica.  As needed oxycodone also available in addition to Tylenol.     Hypothyroidism.  Patient is on levothyroxine and liothyronine.     Depression.  Continue Effexor.     Dementia.  Continue prior to admission Namenda and Aricept.     Weakness: PT/OT consulted.  Have recommended TCU which patient and his wife are agreeable to.    DVT Prophylaxis: Warfarin  Code Status: Full Code  Diet: Snacks/Supplements Adult: Boost Shake; Between Meals  Regular Diet Adult    Zimmer Catheter: not present  Disposition: Expected discharge in 5+ days to D. Goals prior to discharge include manage post op issues and anticoagulation.   Incidental Findings: None.  Family updated today: No     Interval History   Assumed care from previous hospitalist. The history was fully reviewed.  The patient reports doing well. No chest pain or shortness of breath. No nausea, vomiting, diarrhea, constipation. No fevers. No other specific complaints identified.     -Data reviewed today: I personally reviewed all new labs and imaging results over the last 24 hours.     Physical Exam   Temp: 97.1  F (36.2  C) Temp src: Temporal BP: (!) 166/89 Pulse: 68   Resp: 8 SpO2: 100 % O2 Device: Nasal cannula Oxygen Delivery: 2 LPM  Vitals:    01/22/21 1442 01/25/21 1729 02/01/21 0623   Weight: 140.3 kg (309 lb 3.2 oz) 147.9 kg (326 lb 1.6 oz) 142 kg (313 lb 1.6 oz)     Vital Signs with Ranges  Temp:  [96  F (35.6  C)-97.9  F (36.6  C)] 97.1  F (36.2  C)  Pulse:  [62-76] 68  Resp:  [0-18] 8  BP: (126-166)/(67-97) 166/89  SpO2:  [90 %-100 %] 100 %  I/O last 3 completed shifts:  In:  1516.4 [P.O.:1480; I.V.:36.4]  Out: 2375 [Urine:2325; Drains:50]    GENERAL: No apparent distress. Awake, alert, and fully oriented.  HEENT: Normocephalic, atraumatic. Extraocular movements intact.  CARDIOVASCULAR: Regular rate and rhythm without murmurs or rubs. No S3.  PULMONARY: Clear bilaterally.  GASTROINTESTINAL: Soft, non-tender, non-distended. Bowel sounds normoactive.   EXTREMITIES: No cyanosis or clubbing. No edema.  NEUROLOGICAL: CN 2-12 grossly intact, no focal neurological deficits.  DERMATOLOGICAL: No rash, ulcer, bruising, nor jaundice.     Medications     heparin       - MEDICATION INSTRUCTIONS -         acetaminophen  1,000 mg Oral TID     docusate sodium  100 mg Oral BID     donepezil  20 mg Oral At Bedtime     folic acid  5 mg Oral Daily     guanFACINE  1 mg Oral At Bedtime     lamoTRIgine  25 mg Oral Daily     levothyroxine  150 mcg Oral Daily     liothyronine  25 mcg Oral Daily     memantine  10 mg Oral BID     multivitamin w/minerals  1 tablet Oral Daily     polyethylene glycol  17 g Oral Daily     pregabalin  100 mg Oral TID     propafenone  150 mg Oral Q8H     silver sulfADIAZINE   Topical BID     simvastatin  40 mg Oral At Bedtime     sodium chloride (PF)  10 mL Intracatheter Q7 Days     sodium chloride (PF)  3 mL Intracatheter Q8H     venlafaxine  300 mg Oral Daily     vitamin C  500 mg Oral Daily     zinc sulfate  220 mg Oral Daily     Data     Laboratory:  Recent Labs   Lab 02/01/21  0641 01/29/21  0640 01/28/21  1329 01/28/21  0628   WBC 6.8 9.2  --  8.7   HGB 11.9* 11.4* 11.6* 11.3*   HCT 38.5* 37.1*  --  36.1*    101*  --  100    259  --  239     Recent Labs   Lab 02/01/21  0641 01/29/21  0640 01/28/21  0628    140 140   POTASSIUM 4.8 4.0 4.3   CHLORIDE 105 106 106   CO2 31 37* 35*   ANIONGAP 3 <1* <1*   GLC 90 89 97   BUN 21 21 17   CR 1.11 1.19 1.19   GFRESTIMATED 70 64 64   GFRESTBLACK 81 74 74   CATERINA 9.0 8.7 8.7     No results for input(s): CULT in the last 168  hours.    Imaging:  No results found for this or any previous visit (from the past 24 hour(s)).      Aurelio Etienne DO MPH  [facility] Hospitalist  [address] [place] [address]  Pager: [phone]  02/03/2021

## 2021-02-04 ENCOUNTER — APPOINTMENT (OUTPATIENT)
Dept: OCCUPATIONAL THERAPY | Facility: CLINIC | Age: 66
End: 2021-02-04
Payer: COMMERCIAL

## 2021-02-04 ENCOUNTER — APPOINTMENT (OUTPATIENT)
Dept: PHYSICAL THERAPY | Facility: CLINIC | Age: 66
End: 2021-02-04
Payer: COMMERCIAL

## 2021-02-04 LAB
ANION GAP SERPL CALCULATED.3IONS-SCNC: 1 MMOL/L (ref 3–14)
APTT PPP: 63 SEC (ref 22–37)
BUN SERPL-MCNC: 21 MG/DL (ref 7–30)
CALCIUM SERPL-MCNC: 9.1 MG/DL (ref 8.5–10.1)
CHLORIDE SERPL-SCNC: 105 MMOL/L (ref 94–109)
CO2 SERPL-SCNC: 32 MMOL/L (ref 20–32)
CREAT SERPL-MCNC: 1.12 MG/DL (ref 0.66–1.25)
ERYTHROCYTE [DISTWIDTH] IN BLOOD BY AUTOMATED COUNT: 13.4 % (ref 10–15)
GFR SERPL CREATININE-BSD FRML MDRD: 69 ML/MIN/{1.73_M2}
GLUCOSE SERPL-MCNC: 110 MG/DL (ref 70–99)
HCT VFR BLD AUTO: 37.1 % (ref 40–53)
HGB BLD-MCNC: 11.8 G/DL (ref 13.3–17.7)
INR PPP: 0.97 (ref 0.86–1.14)
MCH RBC QN AUTO: 31.4 PG (ref 26.5–33)
MCHC RBC AUTO-ENTMCNC: 31.8 G/DL (ref 31.5–36.5)
MCV RBC AUTO: 99 FL (ref 78–100)
PLATELET # BLD AUTO: 258 10E9/L (ref 150–450)
POTASSIUM SERPL-SCNC: 4.1 MMOL/L (ref 3.4–5.3)
RBC # BLD AUTO: 3.76 10E12/L (ref 4.4–5.9)
SODIUM SERPL-SCNC: 138 MMOL/L (ref 133–144)
UFH PPP CHRO-ACNC: 0.3 IU/ML
UFH PPP CHRO-ACNC: 0.38 IU/ML
WBC # BLD AUTO: 8.4 10E9/L (ref 4–11)

## 2021-02-04 PROCEDURE — 85610 PROTHROMBIN TIME: CPT | Performed by: HOSPITALIST

## 2021-02-04 PROCEDURE — 85730 THROMBOPLASTIN TIME PARTIAL: CPT | Performed by: HOSPITALIST

## 2021-02-04 PROCEDURE — 97110 THERAPEUTIC EXERCISES: CPT | Mod: GO

## 2021-02-04 PROCEDURE — 99233 SBSQ HOSP IP/OBS HIGH 50: CPT | Performed by: HOSPITALIST

## 2021-02-04 PROCEDURE — 250N000013 HC RX MED GY IP 250 OP 250 PS 637: Performed by: HOSPITALIST

## 2021-02-04 PROCEDURE — 97116 GAIT TRAINING THERAPY: CPT | Mod: GP | Performed by: PHYSICAL THERAPIST

## 2021-02-04 PROCEDURE — 97530 THERAPEUTIC ACTIVITIES: CPT | Mod: GP | Performed by: PHYSICAL THERAPIST

## 2021-02-04 PROCEDURE — 85520 HEPARIN ASSAY: CPT | Performed by: HOSPITALIST

## 2021-02-04 PROCEDURE — 250N000013 HC RX MED GY IP 250 OP 250 PS 637: Performed by: INTERNAL MEDICINE

## 2021-02-04 PROCEDURE — 36415 COLL VENOUS BLD VENIPUNCTURE: CPT | Performed by: HOSPITALIST

## 2021-02-04 PROCEDURE — 85027 COMPLETE CBC AUTOMATED: CPT | Performed by: HOSPITALIST

## 2021-02-04 PROCEDURE — 250N000011 HC RX IP 250 OP 636: Performed by: HOSPITALIST

## 2021-02-04 PROCEDURE — 80048 BASIC METABOLIC PNL TOTAL CA: CPT | Performed by: HOSPITALIST

## 2021-02-04 PROCEDURE — 250N000013 HC RX MED GY IP 250 OP 250 PS 637: Performed by: STUDENT IN AN ORGANIZED HEALTH CARE EDUCATION/TRAINING PROGRAM

## 2021-02-04 PROCEDURE — 120N000001 HC R&B MED SURG/OB

## 2021-02-04 RX ORDER — WARFARIN SODIUM 7.5 MG/1
7.5 TABLET ORAL
Status: COMPLETED | OUTPATIENT
Start: 2021-02-04 | End: 2021-02-04

## 2021-02-04 RX ADMIN — SIMVASTATIN 40 MG: 40 TABLET, FILM COATED ORAL at 21:41

## 2021-02-04 RX ADMIN — LEVOTHYROXINE SODIUM 150 MCG: 150 TABLET ORAL at 08:21

## 2021-02-04 RX ADMIN — MEMANTINE 10 MG: 5 TABLET ORAL at 08:22

## 2021-02-04 RX ADMIN — DONEPEZIL HYDROCHLORIDE 20 MG: 10 TABLET ORAL at 21:41

## 2021-02-04 RX ADMIN — PREGABALIN 100 MG: 100 CAPSULE ORAL at 08:21

## 2021-02-04 RX ADMIN — WARFARIN SODIUM 7.5 MG: 7.5 TABLET ORAL at 18:41

## 2021-02-04 RX ADMIN — HEPARIN SODIUM 1250 UNITS/HR: 10000 INJECTION, SOLUTION INTRAVENOUS at 14:17

## 2021-02-04 RX ADMIN — VENLAFAXINE HYDROCHLORIDE 300 MG: 150 CAPSULE, EXTENDED RELEASE ORAL at 08:22

## 2021-02-04 RX ADMIN — PREGABALIN 100 MG: 100 CAPSULE ORAL at 21:08

## 2021-02-04 RX ADMIN — ACETAMINOPHEN 1000 MG: 500 TABLET, FILM COATED ORAL at 14:00

## 2021-02-04 RX ADMIN — ACETAMINOPHEN 1000 MG: 500 TABLET, FILM COATED ORAL at 08:21

## 2021-02-04 RX ADMIN — PROPAFENONE HYDROCHLORIDE 150 MG: 150 TABLET, FILM COATED ORAL at 06:30

## 2021-02-04 RX ADMIN — MULTIPLE VITAMINS W/ MINERALS TAB 1 TABLET: TAB at 08:21

## 2021-02-04 RX ADMIN — DOCUSATE SODIUM 100 MG: 100 CAPSULE, LIQUID FILLED ORAL at 21:08

## 2021-02-04 RX ADMIN — PROPAFENONE HYDROCHLORIDE 150 MG: 150 TABLET, FILM COATED ORAL at 21:41

## 2021-02-04 RX ADMIN — FOLIC ACID 5 MG: 1 TABLET ORAL at 08:20

## 2021-02-04 RX ADMIN — PREGABALIN 100 MG: 100 CAPSULE ORAL at 14:00

## 2021-02-04 RX ADMIN — LIOTHYRONINE SODIUM 25 MCG: 25 TABLET ORAL at 08:21

## 2021-02-04 RX ADMIN — LAMOTRIGINE 25 MG: 25 TABLET ORAL at 08:21

## 2021-02-04 RX ADMIN — OXYCODONE HYDROCHLORIDE 5 MG: 5 TABLET ORAL at 21:13

## 2021-02-04 RX ADMIN — PROPAFENONE HYDROCHLORIDE 150 MG: 150 TABLET, FILM COATED ORAL at 14:00

## 2021-02-04 RX ADMIN — GUANFACINE 1 MG: 1 TABLET ORAL at 21:41

## 2021-02-04 RX ADMIN — DOCUSATE SODIUM 100 MG: 100 CAPSULE, LIQUID FILLED ORAL at 08:22

## 2021-02-04 RX ADMIN — POLYETHYLENE GLYCOL 3350 17 G: 17 POWDER, FOR SOLUTION ORAL at 08:22

## 2021-02-04 RX ADMIN — MEMANTINE 10 MG: 5 TABLET ORAL at 21:08

## 2021-02-04 RX ADMIN — ACETAMINOPHEN 1000 MG: 500 TABLET, FILM COATED ORAL at 21:08

## 2021-02-04 ASSESSMENT — ACTIVITIES OF DAILY LIVING (ADL)
ADLS_ACUITY_SCORE: 23

## 2021-02-04 NOTE — PROGRESS NOTES
Care Management Follow Up    Length of Stay (days): 13    Expected Discharge Date: 02/09/21     Concerns to be Addressed: discharge planning     Patient plan of care discussed at interdisciplinary rounds: Yes    Anticipated Discharge Disposition: Transitional Care     Anticipated Discharge Services:    Anticipated Discharge DME: Wound Vac    Patient/family educated on Medicare website which has current facility and service quality ratings: yes  Education Provided on the Discharge Plan:    Patient/Family in Agreement with the Plan: yes    Referrals Placed by CM/SW: Post Acute Facilities  Private pay costs discussed: transportation costs    Additional Information:  CM spoke with wife, Lissy to discuss discharge planning. Lissy states she spoke with the surgeon and is hopeful for a discharge by early next week (hopeful for Tuesday). Pt mobility improving but has 14 stairs to navigate into the home. They are hopeful pt is excepted at TCU. Referrals have been sent in order of preference:  1. [facility]  2. [facility]  3. [facility]  With discharge date of Tuesday 2/9. Pt may have a wound vac at discharge. Will work with facility to arrange this at discharge.    [facility] wheelchair transport needs to be arranged when discharge is final.    Will continue to follow with discharge planning.    Brittnee Castro RN, BSN CTS  Care Coordinator  [facility]   [phone]

## 2021-02-04 NOTE — PROGRESS NOTES
Todd S Aschoff is a 65 year old male patient POSTOPERATIVE DAY 1 s/p Split thickness skin graft from left thigh to the distal left lower extremity wound 18sq cm and closure of proximal left lower leg wound (approx 8cm) and coverage with a incisional management device. Denies pain or issues overnight. Spoke to his wife this morning with updates.     1. Generalized muscle weakness    2. Hypotension, unspecified hypotension type    3. Polypharmacy    4. Anemia, unspecified type    5. Renal insufficiency      Past Medical History:   Diagnosis Date     Alcohol dependence (H)      Allergy, unspecified not elsewhere classified     seasonal     Antiplatelet or antithrombotic long-term use     coumadin/lovenox     Aortic valve prosthesis present      Atrial fibrillation (H)      Blood transfusion     after heart surg 1992     BPH (benign prostatic hypertrophy)      Chronic infection     low back wound incision not healing      Chronic pain     lower back and right leg and left leg     Coagulation disorder (H)     on blood thinners     Dissection of aorta, thoracic (H) 1992    [facility] aotic valve + arch graft 1992     Headache(784.0)      Heart murmur     aortic valve replaced and arch     History of spinal cord injury      Low back pain      Major depression      Mixed hyperlipidemia      Numbness and tingling     right leg post surg rightt hip/ also left leg since surg     OA (osteoarthritis)     hips     Obesity, unspecified      Prostate infection      Sciatica 2002    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     Allergies   Allergen Reactions     Gabapentin      Severe behavioral disturbances     Active Problems:    Polypharmacy    Renal insufficiency    Generalized muscle weakness    Hypotension, unspecified hypotension type    Anemia, unspecified type    Blood pressure 122/64, pulse 68, temperature 97.9  F (36.6  C), temperature source  "Temporal, resp. rate 16, height 1.854 m (6' 1\"), weight 142 kg (313 lb 1.6 oz), SpO2 98 %.    Subjective  Objective   VAC intact holding suction  Proximal donor site appropriate with tegaderm dressing and ace wrap  Boot in place on foot  Foot elevated and neurovascularly intact distally    Assessment:    Condition: In stable condition.  Improving.       Plan:   Encourage ambulation.  Regular diet.         POD#1 s/p STSG and Closure L Leg. Continue VAC till 2/10.   Ambulate with boot in place      Lazaro Plascencia MD  2/4/2021    "

## 2021-02-04 NOTE — PROGRESS NOTES
CLINICAL NUTRITION SERVICES - REASSESSMENT NOTE      MALNUTRITION: (2/04)  % Weight Loss:  None noted  % Intake:  No decreased intake noted  Subcutaneous Fat Loss:  Upper arm region mild to moderate depletion --> not using as indicator with only 1 region present   Muscle Loss:  None observed (?masked by adiposity)  Fluid Retention:  Mild, LEs --> potential to slightly mask true wt trending     Malnutrition Diagnosis: Patient does not meet two of the above criteria necessary for diagnosing malnutrition       EVALUATION OF PROGRESS TOWARD GOALS   Diet: Regular, Boost shake between meals BID    Intake/Tolerance:  Continues to consume % of meals.  Noted instance of meal refusal, otherwise consistently ordering meals TID and was NPO 2/03 for procedure as outlined below.  Otherwise, suspect meeting >75% needs orally with supplement contribution.    - WOCN and plastics continues to follow.  Went back to the OR yesterday for skin grafting, ongoing wound VAC.      ASSESSED NUTRITION NEEDS PER APPROVED PRACTICE GUIDELINES:     Dosing Weight 140 kg   Estimated Energy Needs: >/=20 Kcal/Kg  Justification: minimum maintenance, wound healing  Estimated Protein Needs: >/=1 g pro/Kg  Justification: preservation of lean body mass, anticipated post-op/wound healing  Estimated Fluid Needs: per MD      NEW FINDINGS:   - Medications reviewed including:     acetaminophen  1,000 mg Oral TID     docusate sodium  100 mg Oral BID     donepezil  20 mg Oral At Bedtime     folic acid  5 mg Oral Daily     guanFACINE  1 mg Oral At Bedtime     lamoTRIgine  25 mg Oral Daily     levothyroxine  150 mcg Oral Daily     liothyronine  25 mcg Oral Daily     memantine  10 mg Oral BID     multivitamin w/minerals  1 tablet Oral Daily     polyethylene glycol  17 g Oral Daily     pregabalin  100 mg Oral TID     propafenone  150 mg Oral Q8H     silver sulfADIAZINE   Topical BID     simvastatin  40 mg Oral At Bedtime     sodium chloride (PF)  10 mL  Intracatheter Q7 Days     sodium chloride (PF)  3 mL Intracatheter Q8H     venlafaxine  300 mg Oral Daily        heparin 1,200 Units/hr (02/04/21 0827)     - MEDICATION INSTRUCTIONS -       Warfarin Therapy Reminder        - Labs reviewed including:  Electrolytes  Potassium (mmol/L)   Date Value   02/04/2021 4.1   02/01/2021 4.8   01/29/2021 4.0     Phosphorus (mg/dL)   Date Value   08/14/2010 2.6    Blood Glucose  Glucose (mg/dL)   Date Value   02/04/2021 110 (H)   02/01/2021 90   01/29/2021 89   01/28/2021 97   01/27/2021 114 (H)     Hemoglobin A1C (%)   Date Value   01/23/2021 4.9   01/13/2012 5.6   07/29/2011 5.6   06/09/2010 5.0@   10/23/2009 5.4    Inflammatory Markers  CRP Inflammation (mg/L)   Date Value   11/27/2020 2.9   04/18/2013 13.4 (H)   07/02/2010 44.6 (H)     WBC (10e9/L)   Date Value   02/04/2021 8.4   02/01/2021 6.8   01/29/2021 9.2     Albumin (g/dL)   Date Value   01/22/2021 3.2 (L)   01/09/2021 3.4   11/27/2020 3.6      Magnesium (mg/dL)   Date Value   01/22/2021 2.0   08/14/2010 2.1     Sodium (mmol/L)   Date Value   02/04/2021 138   02/01/2021 139   01/29/2021 140    Renal  Urea Nitrogen (mg/dL)   Date Value   02/04/2021 21   02/01/2021 21   01/29/2021 21     Creatinine (mg/dL)   Date Value   02/04/2021 1.12   02/01/2021 1.11   01/29/2021 1.19     Additional  Triglycerides (mg/dL)   Date Value   01/23/2021 130   12/11/2020 192 (H)   10/29/2019 174 (H)     Ketones Urine (mg/dL)   Date Value   01/22/2021 Negative        -  Weight trending reviewed.  Most recent wt and admit wt relatively consistently, possibility of slight fluid shifts:  Vitals:    01/22/21 1442 01/25/21 1729 02/01/21 0623   Weight: 140.3 kg (309 lb 3.2 oz) 147.9 kg (326 lb 1.6 oz) 142 kg (313 lb 1.6 oz)     - Stooling patterns reviewed.    Previous Goals:   Pt to consume at least 75% of meals TID + 2 supplements daily.    Evaluation: Met    Previous Nutrition Diagnosis:   Increased nutrient needs (protein) related to wound  healing as evidenced by protein needs as outlined, s/p wound VAC placement and debridement.  Evaluation: No change      CURRENT NUTRITION DIAGNOSIS  Increased nutrient needs (protein) related to wound healing as evidenced by protein needs as outlined, s/p wound VAC placement, debridement, and skin grafting.    INTERVENTIONS  Recommendations / Nutrition Prescription  Continue diet and supplements as ordered.    Completed course of zinc sulfate and ascorbic acid per request of plastics for wound healing.  Ongoing daily MVI/M.    Implementation  Nutrition Education: No ongoing education needs at this time.  Refer to previous protein educations.    Goals  Patient to consume at least 75% of meals TID + 1-2 supplements daily.      MONITORING AND EVALUATION:  Progress towards goals will be monitored and evaluated per protocol and Practice Guidelines      Nolvia Pizarro RDN, LD  Clinical Dietitian  3rd floor/ICU: [phone]  All other floors: [phone]  Weekend/holiday: [phone]

## 2021-02-04 NOTE — PROGRESS NOTES
Care Management Follow Up    Length of Stay (days): 13    Expected Discharge Date: 02/08/21     Concerns to be Addressed:       Patient plan of care discussed at interdisciplinary rounds: Yes    Anticipated Discharge Disposition:  Home vs TCU     Anticipated Discharge Services:    Anticipated Discharge DME:      Patient/family educated on Medicare website which has current facility and service quality ratings: YES    Education Provided on the Discharge Plan:    Patient/Family in Agreement with the Plan: YES     Referrals Placed by CM/SW: none yet   Private pay costs discussed: transportation costs    Additional Information:  Follow up with discharge planning with pt. Pt back to OR yesterday, still has wound vac. Therapy to eval today with mobility. Will continue to follow with recommedations, if TCU is recommended will send referrals, choices already discussed with pts wife, Lissy, last week, when discharge date is better known. MD note from yesterday states 5+ days. Will continue to follow.    Brittnee Castro RN, BSN CTS  Care Coordinator  [facility]   [phone]

## 2021-02-04 NOTE — PLAN OF CARE
Pain controlled with po meds. LLE elevated on blue wedge- avoiding weight bearing. Wound vac in place- drsg intact. Baseline numbness otherwise cms intact- cool with 1+ pulses. Pt dave regular diet. Voiding per urinal and also incontinent. Heparin gtt restarted. Recheck at 0030.

## 2021-02-04 NOTE — PROGRESS NOTES
[facility]    Hospitalist Progress Note  Name: Todd S Aschoff    MRN: [id number]  Provider:  Aurelio Etienne DO MPH  Date of Service: 02/04/2021    Summary of Stay: Todd S Aschoff is a 65 year old male with past medical history of aortic dissection status post mechanical aortic valve plus graft (1992), atrial fibrillation (chronic anticoagulation with Coumadin), chronic pain, dementia and depression who was hospitalized 1/9-1/13 here for left leg wound with drainage (initially caused by a fall approximately 1 month prior) which was found to be infected hematoma requiring I&D (1/10) and ultimately discharged home on oral antibiotics who was admitted on 1/22/2021 with generalized weakness, confusion and hypotension.  Patient was found to have acute CVA but no longer has any focal deficits.  Also likely had a seizure prior to admission.       Patient underwent surgery with plastics on 1/26 with removal of 1400 mL of hematoma (no active bleeding) and placement of wound vac.  Wound improving so wound vac changed at the bedside on 1/28.  Has been undergoing [facility] wound vac changes, Dr. Plascencia took the patient to the operating room on Wednesday, 2/3 for skin graft.  Plan to keep the patient here for 5 days after his skin graft for wound cares etc.       Problem List/Assessment and Plan:   Acute right frontal infarct, suspect this is a silent infarct as he has no neurological deficits: Patient did undergo CT of the head which showed age indeterminate right occipital infarct.  MRI of the brain showed an acute infarct in the right frontal vertex and multiple chronic infarcts in the right occipital lobe and bilateral cerebellar hemispheres.  MRA shows possible diffuse stenosis of the left PCA.  TTE showed no change from prior.  He has no neurological deficits.  -Stroke neurology consulted, appreciate recommendations.  -Continue simvastatin 40 mg daily.  -Continue Coumadin as PTA, bridging with heparin  perioperatively.  -Follow-up with PCP in 1 to 2 weeks, follow-up with neurology in clinic in 8 weeks.     Suspected seizure: Patient's wife describes seizure-like activity prior to the events that led to his hospitalization.  He had a right arm shaking and unresponsiveness.  He does have an abnormal MRI as above which certainly could serve as a seizure focus.  Neurology recommended starting lamotrigine. No further seizure activity.  -Seizure precautions.  -General neurology consulted, appreciate recommendations.  -Started lamotrigine 25 mg daily on 1/24.  -Need to follow-up with general neurology in 1-2 weeks (at that time lamotrigine can be increased to 50 mg daily and EEG can be done) -given the plan to keep the patient here for 5 days after surgery may need to touch base with Neurology prior to discharge to see if the Lamotrigine needs to be increased before his discharge.  -No driving, climbing, tub baths or potential dangerous activity in the next 3 months.     Left lower extremity infected hematoma status post I&D on 1/10, nonhealing with associated cellulitis s/p Large hematoma evaluation and placement of wound vac on 1/26.  S/p fall in December 2020.  During his last admission was found to have infected hematoma.  Underwent I&D with Ortho on 1/10.  His wound culture grew E. coli and Enterococcus faecalis.  ID did follow throughout that admission and felt that the pathogen was E. coli.  He was treated with IV ceftriaxone and at time of discharge cefuroxime for an additional 7 days.  He has had dehiscence of the lower wound with surrounding necrosis.  He underwent I&D with evacuation of 1400 mL hematoma (no active bleeding) on 1/26 with placement of wound vac.  Wound improving after this procedure per Dr. Plascencia.  Return to the OR on 2/3 for split thickness skin graft and closure with ongoing wound VAC.  -Orthopedics consulted, appreciate recommendations.   -Plastic surgery consulted, appreciate  recommendations.  -Wound vac management for 5 days.  -Completed ceftriaxone 7 days on 1/29/21.  -Wound nurse consulted, appreciate recommendations.     Acute toxic/metabolic encephalopathy - Resolved: Presented with diffuse weakness and significant confusion.  He had significant hypotension.  With hydration his mental status did improve.  I suspect his confusion was multifactorial due to likely seizure, hypotension, acute kidney injury.       Acute kidney injury.  Improved.  Baseline creatinine approximately 1.  On admission creatinine was elevated at 1.58, peaked 1.66.  He is urinating.  He was quite hypotensive on admission which could have lead to ATN causing his renal insufficiency.   -No NSAIDs  -BMP in the morning     Left Shoulder Pain: Patient had pain in the left shoulder (fall/seizure PTA as above).  Orthopedics consulted.  He has no fracture but does have left RC tendinitis with AC joint arthrosis and probable loose bodies.  He underwent left shoulder subacromial injection on 2/25 and feels that this has helped his pain.  -Follow-up with [facility] in outpatient setting for further work-up if persistent pain/decreased mobility     Hypotension - Resolved: Patient had rather profound hypotension on presentation.  This improved significantly with fluids.  Blood pressure now is stable.     Chronic anticoagulation with warfarin for mechanical aortic valve and atrial fibrillation. INR was supratherapeutic at 6.9 (1/23).  INR reversed for surgery with Vitamin K.  He was subsequently started on heparin drip in the perioperative setting.  His goal INR level is 2.5-3.5.    -Heparin drip to restart tonight at 6pm without boluses and loading dose.  -Restart coumadin and bridge post-op.  -Possible transition to Lovenox for bridging tomorrow after all of his surgical procedures are complete and verify the hemoglobin is stable.     Acute Anemia: Historically Hgb had been normal, decreased to 12 range during his last  hospitalization.  This admission was 12.8 on admission, has decreased to 11.3 but stable over the past few days.  Did have large hematoma evacuated of the LLE on 1/26 as above, no active bleeding.  Iron checked and normal. No indication for transfusion at this time.  -Daily Hgb.     Atrial fibrillation.  Resumed the patient on his propafenone.  Anticoagulation plan as above.     Chronic pain.  Patient is on Lyrica.  As needed oxycodone also available in addition to Tylenol.     Hypothyroidism.  Patient is on levothyroxine and liothyronine.     Depression.  Continue Effexor.     Dementia.  Continue prior to admission Namenda and Aricept.     Weakness: PT/OT consulted.  Have recommended TCU which patient and his wife are agreeable to.    DVT Prophylaxis: Warfarin  Code Status: Full Code  Diet: Snacks/Supplements Adult: Boost Shake; Between Meals  Regular Diet Adult    Zimmer Catheter: not present  Disposition: Expected discharge in 5+ days to D. Goals prior to discharge include manage post op issues and anticoagulation.   Incidental Findings: None.  Family updated today: Called significant other Lissy but now answer.     Interval History   The patient reports doing well. No chest pain or shortness of breath. No nausea, vomiting, diarrhea, constipation. No fevers. No other specific complaints identified.     -Data reviewed today: I personally reviewed all new labs and imaging results over the last 24 hours.     Physical Exam   Temp: 97.3  F (36.3  C) Temp src: Temporal BP: 134/71 Pulse: 65   Resp: 11 SpO2: 93 % O2 Device: None (Room air) Oxygen Delivery: 2 LPM  Vitals:    01/22/21 1442 01/25/21 1729 02/01/21 0623   Weight: 140.3 kg (309 lb 3.2 oz) 147.9 kg (326 lb 1.6 oz) 142 kg (313 lb 1.6 oz)     Vital Signs with Ranges  Temp:  [97.1  F (36.2  C)-99  F (37.2  C)] 97.3  F (36.3  C)  Pulse:  [62-74] 65  Resp:  [0-18] 11  BP: (110-166)/(58-91) 134/71  SpO2:  [92 %-100 %] 93 %  I/O last 3 completed shifts:  In: 2190  [P.O.:890; I.V.:1300]  Out: 1252 [Urine:1250; Blood:2]    GENERAL: No apparent distress. Awake, alert, and fully oriented.  HEENT: Normocephalic, atraumatic. Extraocular movements intact.  CARDIOVASCULAR: Regular rate and rhythm without murmurs or rubs. No S3.  PULMONARY: Clear bilaterally.  GASTROINTESTINAL: Soft, non-tender, non-distended. Bowel sounds normoactive.   EXTREMITIES: No cyanosis or clubbing. No edema.  NEUROLOGICAL: CN 2-12 grossly intact, no focal neurological deficits.  DERMATOLOGICAL: No rash, ulcer, bruising, nor jaundice.     Medications     heparin 1,200 Units/hr (02/04/21 0827)     - MEDICATION INSTRUCTIONS -       Warfarin Therapy Reminder         acetaminophen  1,000 mg Oral TID     docusate sodium  100 mg Oral BID     donepezil  20 mg Oral At Bedtime     folic acid  5 mg Oral Daily     guanFACINE  1 mg Oral At Bedtime     lamoTRIgine  25 mg Oral Daily     levothyroxine  150 mcg Oral Daily     liothyronine  25 mcg Oral Daily     memantine  10 mg Oral BID     multivitamin w/minerals  1 tablet Oral Daily     polyethylene glycol  17 g Oral Daily     pregabalin  100 mg Oral TID     propafenone  150 mg Oral Q8H     silver sulfADIAZINE   Topical BID     simvastatin  40 mg Oral At Bedtime     sodium chloride (PF)  10 mL Intracatheter Q7 Days     sodium chloride (PF)  3 mL Intracatheter Q8H     venlafaxine  300 mg Oral Daily     Data     Laboratory:  Recent Labs   Lab 02/04/21  0640 02/01/21  0641 01/29/21  0640   WBC 8.4 6.8 9.2   HGB 11.8* 11.9* 11.4*   HCT 37.1* 38.5* 37.1*   MCV 99 100 101*    247 259     Recent Labs   Lab 02/04/21  0640 02/01/21  0641 01/29/21  0640    139 140   POTASSIUM 4.1 4.8 4.0   CHLORIDE 105 105 106   CO2 32 31 37*   ANIONGAP 1* 3 <1*   * 90 89   BUN 21 21 21   CR 1.12 1.11 1.19   GFRESTIMATED 69 70 64   GFRESTBLACK 80 81 74   CATERINA 9.1 9.0 8.7     No results for input(s): CULT in the last 168 hours.    Imaging:  No results found for this or any previous  visit (from the past 24 hour(s)).      Aurelio Etienne DO MPH  [facility] Hospitalist  [address] [place] [address]  Pager: [phone]  02/04/2021

## 2021-02-04 NOTE — PLAN OF CARE
PT is A&Ox4. Placed on 2 L O2 overnight and back to room air in the morning with capnography in place. Denying pain this shift. On a regular diet. Afebrile. Incontinent of urine, perineal cares provided. Midline patent. Heparin gtt infusing per plan of care. Wound vac in place to LLE and extremity is elevated on blue wedge pillow. WT as dave to LLE but no bending of L ankle. PT not OOB yet, repositioning with A-2.  Plan of care reviewed with patient.

## 2021-02-04 NOTE — PLAN OF CARE
VS-afebrile, stable, on capnography,   Lung Sounds-clear, but diminished bases, no cough,   O2-on room air,   GI-+Bs, +flatus, lbm was Wednesday, tolerating reg diet, had boost and tolerating, denies nausea.   -voiding via urinal, wearing briefs, is also incontinent.   IVF-heparin drip maintaining at 1200 units/hour. No change, has midline  Dressings-wound vac in place, at cont 125 pressure, no drainage,   CMS-has numbness and tingling, +1pp, swelling in ankles, L leg elevated on Blue wedge, , boot in place on L ankle.   Drain-wound vac. No drainage.   Activity-bedrest,  up with PT, walk in room and walked to  nursing desk with PT . Worked with OT in the afternoon.  Pain-denies pain.   D/C Plan-updated wife, plastic MD will talk with her as well, to be determined for discharge,  Wife here and updated pt.       1220:[employer] representative, BRADLEY or Kenisha  Phone  [phone]. Please call before discharge if patient discharges with purple foam drsg for wound vac. To see if pts portable wound vac fits properly. They will come and assess before dishcharge    ;

## 2021-02-05 ENCOUNTER — APPOINTMENT (OUTPATIENT)
Dept: OCCUPATIONAL THERAPY | Facility: CLINIC | Age: 66
End: 2021-02-05
Payer: COMMERCIAL

## 2021-02-05 ENCOUNTER — APPOINTMENT (OUTPATIENT)
Dept: PHYSICAL THERAPY | Facility: CLINIC | Age: 66
End: 2021-02-05
Payer: COMMERCIAL

## 2021-02-05 LAB
ANION GAP SERPL CALCULATED.3IONS-SCNC: 3 MMOL/L (ref 3–14)
BUN SERPL-MCNC: 24 MG/DL (ref 7–30)
CALCIUM SERPL-MCNC: 9.1 MG/DL (ref 8.5–10.1)
CHLORIDE SERPL-SCNC: 105 MMOL/L (ref 94–109)
CO2 SERPL-SCNC: 31 MMOL/L (ref 20–32)
CREAT SERPL-MCNC: 1.04 MG/DL (ref 0.66–1.25)
ERYTHROCYTE [DISTWIDTH] IN BLOOD BY AUTOMATED COUNT: 14 % (ref 10–15)
GFR SERPL CREATININE-BSD FRML MDRD: 75 ML/MIN/{1.73_M2}
GLUCOSE SERPL-MCNC: 90 MG/DL (ref 70–99)
HCT VFR BLD AUTO: 39.4 % (ref 40–53)
HGB BLD-MCNC: 12.2 G/DL (ref 13.3–17.7)
INR PPP: 1.02 (ref 0.86–1.14)
MCH RBC QN AUTO: 31.2 PG (ref 26.5–33)
MCHC RBC AUTO-ENTMCNC: 31 G/DL (ref 31.5–36.5)
MCV RBC AUTO: 101 FL (ref 78–100)
PLATELET # BLD AUTO: 243 10E9/L (ref 150–450)
POTASSIUM SERPL-SCNC: 4 MMOL/L (ref 3.4–5.3)
RBC # BLD AUTO: 3.91 10E12/L (ref 4.4–5.9)
SODIUM SERPL-SCNC: 139 MMOL/L (ref 133–144)
UFH PPP CHRO-ACNC: 0.39 IU/ML
WBC # BLD AUTO: 7.6 10E9/L (ref 4–11)

## 2021-02-05 PROCEDURE — 85027 COMPLETE CBC AUTOMATED: CPT | Performed by: HOSPITALIST

## 2021-02-05 PROCEDURE — 250N000013 HC RX MED GY IP 250 OP 250 PS 637: Performed by: STUDENT IN AN ORGANIZED HEALTH CARE EDUCATION/TRAINING PROGRAM

## 2021-02-05 PROCEDURE — 85520 HEPARIN ASSAY: CPT | Performed by: HOSPITALIST

## 2021-02-05 PROCEDURE — 250N000013 HC RX MED GY IP 250 OP 250 PS 637: Performed by: HOSPITALIST

## 2021-02-05 PROCEDURE — 97530 THERAPEUTIC ACTIVITIES: CPT | Mod: GO

## 2021-02-05 PROCEDURE — 80048 BASIC METABOLIC PNL TOTAL CA: CPT | Performed by: HOSPITALIST

## 2021-02-05 PROCEDURE — G0463 HOSPITAL OUTPT CLINIC VISIT: HCPCS

## 2021-02-05 PROCEDURE — 250N000011 HC RX IP 250 OP 636: Performed by: HOSPITALIST

## 2021-02-05 PROCEDURE — 36415 COLL VENOUS BLD VENIPUNCTURE: CPT | Performed by: HOSPITALIST

## 2021-02-05 PROCEDURE — 85610 PROTHROMBIN TIME: CPT | Performed by: HOSPITALIST

## 2021-02-05 PROCEDURE — 120N000001 HC R&B MED SURG/OB

## 2021-02-05 PROCEDURE — 97116 GAIT TRAINING THERAPY: CPT | Mod: GP | Performed by: PHYSICAL THERAPIST

## 2021-02-05 PROCEDURE — 99233 SBSQ HOSP IP/OBS HIGH 50: CPT | Performed by: HOSPITALIST

## 2021-02-05 PROCEDURE — 97530 THERAPEUTIC ACTIVITIES: CPT | Mod: GP | Performed by: PHYSICAL THERAPIST

## 2021-02-05 PROCEDURE — 250N000013 HC RX MED GY IP 250 OP 250 PS 637: Performed by: INTERNAL MEDICINE

## 2021-02-05 RX ORDER — WARFARIN SODIUM 7.5 MG/1
7.5 TABLET ORAL
Status: COMPLETED | OUTPATIENT
Start: 2021-02-05 | End: 2021-02-05

## 2021-02-05 RX ADMIN — ACETAMINOPHEN 1000 MG: 500 TABLET, FILM COATED ORAL at 14:09

## 2021-02-05 RX ADMIN — PREGABALIN 100 MG: 100 CAPSULE ORAL at 20:28

## 2021-02-05 RX ADMIN — SIMVASTATIN 40 MG: 40 TABLET, FILM COATED ORAL at 21:26

## 2021-02-05 RX ADMIN — PREGABALIN 100 MG: 100 CAPSULE ORAL at 14:09

## 2021-02-05 RX ADMIN — PROPAFENONE HYDROCHLORIDE 150 MG: 150 TABLET, FILM COATED ORAL at 06:37

## 2021-02-05 RX ADMIN — PROPAFENONE HYDROCHLORIDE 150 MG: 150 TABLET, FILM COATED ORAL at 21:25

## 2021-02-05 RX ADMIN — VENLAFAXINE HYDROCHLORIDE 300 MG: 150 CAPSULE, EXTENDED RELEASE ORAL at 09:22

## 2021-02-05 RX ADMIN — ENOXAPARIN SODIUM 150 MG: 150 INJECTION SUBCUTANEOUS at 09:18

## 2021-02-05 RX ADMIN — LIOTHYRONINE SODIUM 25 MCG: 25 TABLET ORAL at 06:37

## 2021-02-05 RX ADMIN — MEMANTINE 10 MG: 5 TABLET ORAL at 20:28

## 2021-02-05 RX ADMIN — DONEPEZIL HYDROCHLORIDE 20 MG: 10 TABLET ORAL at 21:25

## 2021-02-05 RX ADMIN — ENOXAPARIN SODIUM 150 MG: 150 INJECTION SUBCUTANEOUS at 20:28

## 2021-02-05 RX ADMIN — MEMANTINE 10 MG: 5 TABLET ORAL at 09:21

## 2021-02-05 RX ADMIN — OXYCODONE HYDROCHLORIDE 5 MG: 5 TABLET ORAL at 12:34

## 2021-02-05 RX ADMIN — OXYCODONE HYDROCHLORIDE 5 MG: 5 TABLET ORAL at 21:29

## 2021-02-05 RX ADMIN — ACETAMINOPHEN 1000 MG: 500 TABLET, FILM COATED ORAL at 09:19

## 2021-02-05 RX ADMIN — LAMOTRIGINE 25 MG: 25 TABLET ORAL at 09:21

## 2021-02-05 RX ADMIN — LEVOTHYROXINE SODIUM 150 MCG: 150 TABLET ORAL at 06:37

## 2021-02-05 RX ADMIN — PREGABALIN 100 MG: 100 CAPSULE ORAL at 09:21

## 2021-02-05 RX ADMIN — DOCUSATE SODIUM 100 MG: 100 CAPSULE, LIQUID FILLED ORAL at 09:22

## 2021-02-05 RX ADMIN — FOLIC ACID 5 MG: 1 TABLET ORAL at 09:21

## 2021-02-05 RX ADMIN — PROPAFENONE HYDROCHLORIDE 150 MG: 150 TABLET, FILM COATED ORAL at 14:09

## 2021-02-05 RX ADMIN — MULTIPLE VITAMINS W/ MINERALS TAB 1 TABLET: TAB at 09:21

## 2021-02-05 RX ADMIN — WARFARIN SODIUM 7.5 MG: 7.5 TABLET ORAL at 17:33

## 2021-02-05 RX ADMIN — DOCUSATE SODIUM 100 MG: 100 CAPSULE, LIQUID FILLED ORAL at 20:28

## 2021-02-05 RX ADMIN — GUANFACINE 1 MG: 1 TABLET ORAL at 21:25

## 2021-02-05 RX ADMIN — ACETAMINOPHEN 1000 MG: 500 TABLET, FILM COATED ORAL at 20:27

## 2021-02-05 ASSESSMENT — ACTIVITIES OF DAILY LIVING (ADL)
ADLS_ACUITY_SCORE: 23
ADLS_ACUITY_SCORE: 22
ADLS_ACUITY_SCORE: 23

## 2021-02-05 NOTE — PLAN OF CARE
A&Ox4, VSS, up Ax1 GB/W, AFO brace on at all times. Moist sanguinous drainage to ace at graft site. Dressing reinforced and re-wrapped. Wound vac to left shin - no output. Denies pain. Endorses numbness only to LLE.    Plan to discharge to a TCU on 2/9, VAC in place until 2/10.

## 2021-02-05 NOTE — PLAN OF CARE
Pt A&Ox4, VSS, LS clear. +BS/flatus. up Ax1 GB/W, AFO brace on at all times. Tolerated regular diet. Dressing c/d/I after reinforced on night sift. Wound vac to left shin - no output. pain RATED 9 after sitting in recliner prn Oxycodone given and scheduled Tylenol. Pain much improved after elevating again and medications.  Pt denied numbness only to LLE. Possible Discharge Monday or Tuesday TCU. Will continue to monitor.

## 2021-02-05 NOTE — PROGRESS NOTES
[facility]    Hospitalist Progress Note  Name: Todd S Aschoff    MRN: [id number]  Provider:  Aurelio Etienne DO MPH  Date of Service: 02/05/2021    Summary of Stay: Todd S Aschoff is a 65 year old male with past medical history of aortic dissection status post mechanical aortic valve plus graft (1992), atrial fibrillation (chronic anticoagulation with Coumadin), chronic pain, dementia and depression who was hospitalized 1/9-1/13 here for left leg wound with drainage (initially caused by a fall approximately 1 month prior) which was found to be infected hematoma requiring I&D (1/10) and ultimately discharged home on oral antibiotics who was admitted on 1/22/2021 with generalized weakness, confusion and hypotension.  Patient was found to have acute CVA but no longer has any focal deficits.  Also likely had a seizure prior to admission.       Patient underwent surgery with plastics on 1/26 with removal of 1400 mL of hematoma (no active bleeding) and placement of wound vac.  Wound improving so wound vac changed at the bedside on 1/28.  Has been undergoing [facility] wound vac changes, Dr. Plascencia took the patient to the operating room on Wednesday, 2/3 for skin graft.  Plan to keep the patient here for 5 days after his skin graft for wound cares etc.       Problem List/Assessment and Plan:   Acute right frontal infarct, suspect this is a silent infarct as he has no neurological deficits: Patient did undergo CT of the head which showed age indeterminate right occipital infarct.  MRI of the brain showed an acute infarct in the right frontal vertex and multiple chronic infarcts in the right occipital lobe and bilateral cerebellar hemispheres.  MRA shows possible diffuse stenosis of the left PCA.  TTE showed no change from prior.  He has no neurological deficits.  -Stroke neurology consulted, appreciate recommendations.  -Continue simvastatin 40 mg daily.  -Continue Coumadin as PTA, bridging with heparin  perioperatively.  -Follow-up with PCP in 1 to 2 weeks, follow-up with neurology in clinic in 8 weeks.     Suspected seizure: Patient's wife describes seizure-like activity prior to the events that led to his hospitalization.  He had a right arm shaking and unresponsiveness.  He does have an abnormal MRI as above which certainly could serve as a seizure focus.  Neurology recommended starting lamotrigine. No further seizure activity.  -Seizure precautions.  -General neurology consulted, appreciate recommendations.  -Started lamotrigine 25 mg daily on 1/24.  -Need to follow-up with general neurology in 1-2 weeks (at that time lamotrigine can be increased to 50 mg daily and EEG can be done) -given the plan to keep the patient here for 5 days after surgery may need to touch base with Neurology prior to discharge to see if the Lamotrigine needs to be increased before his discharge.  -No driving, climbing, tub baths or potential dangerous activity in the next 3 months.     Left lower extremity infected hematoma status post I&D on 1/10, nonhealing with associated cellulitis s/p Large hematoma evaluation and placement of wound vac on 1/26.  S/p fall in December 2020.  During his last admission was found to have infected hematoma.  Underwent I&D with Ortho on 1/10.  His wound culture grew E. coli and Enterococcus faecalis.  ID did follow throughout that admission and felt that the pathogen was E. coli.  He was treated with IV ceftriaxone and at time of discharge cefuroxime for an additional 7 days.  He has had dehiscence of the lower wound with surrounding necrosis.  He underwent I&D with evacuation of 1400 mL hematoma (no active bleeding) on 1/26 with placement of wound vac.  Wound improving after this procedure per Dr. Plascencia.  Return to the OR on 2/3 for split thickness skin graft and closure with ongoing wound VAC.  -Orthopedics consulted, appreciate recommendations.   -Plastic surgery consulted, appreciate  recommendations.  -Wound vac management for 5 days.  -Completed ceftriaxone 7 days on 1/29/21.  -Wound nurse consulted, appreciate recommendations.     Acute toxic/metabolic encephalopathy - resolved: Presented with diffuse weakness and significant confusion.  He had significant hypotension.  With hydration his mental status did improve.  I suspect his confusion was multifactorial due to likely seizure, hypotension, acute kidney injury.       Acute kidney injury.  Improved.  Baseline creatinine approximately 1.  On admission creatinine was elevated at 1.58, peaked 1.66.  He is urinating.  He was quite hypotensive on admission which could have lead to ATN causing his renal insufficiency.   -No NSAIDs  -Periodic BMP.     Left Shoulder Pain: Patient had pain in the left shoulder (fall/seizure PTA as above).  Orthopedics consulted.  He has no fracture but does have left RC tendinitis with AC joint arthrosis and probable loose bodies.  He underwent left shoulder subacromial injection on 2/25 and feels that this has helped his pain.  -Follow-up with [facility] in outpatient setting for further work-up if persistent pain/decreased mobility     Hypotension - resolved: Patient had rather profound hypotension on presentation.  This improved significantly with fluids.  Blood pressure now is stable.     Chronic anticoagulation with warfarin for mechanical aortic valve and atrial fibrillation: INR was supratherapeutic at 6.9 (1/23).  INR reversed for surgery with Vitamin K.  He was subsequently started on heparin drip in the perioperative setting.  His goal INR level is 2.5-3.5.    -Heparin drip perioperatively but hemoglobin stable.  -Restarted coumadin and bridge post-op.  -Transition to Lovenox today.     Acute Anemia: Historically Hgb had been normal, decreased to 12 range during his last hospitalization.  This admission was 12.8 on admission, has decreased to 11.3 but stable over the past few days.  Did have large hematoma  evacuated of the LLE on 1/26 as above, no active bleeding.  Iron checked and normal. No indication for transfusion at this time.  -Daily Hgb.     Atrial fibrillation: Resumed the patient on his propafenone.  Anticoagulation plan as above.     Chronic pain: Patient is on Lyrica.  As needed oxycodone also available in addition to Tylenol.     Hypothyroidism: Patient is on levothyroxine and liothyronine.     Depression: Continue Effexor.     Dementia: Continue prior to admission Namenda and Aricept.     Weakness: PT/OT consulted.  Have recommended TCU which patient and his wife are agreeable.    DVT Prophylaxis: Warfarin, Lovenox.  Code Status: Full Code  Diet: Snacks/Supplements Adult: Boost Shake; Between Meals  Regular Diet Adult    Zimmer Catheter: not present  Disposition: Expected discharge in 3-4 days to home vs TCU. Goals prior to discharge include manage post op issues and anticoagulation.   Incidental Findings: None.  Family updated today: Yes discussed with wife.     Interval History   The patient reports doing well. No chest pain or shortness of breath. No nausea, vomiting, diarrhea, constipation. No fevers. No other specific complaints identified.     -Data reviewed today: I personally reviewed all new labs and imaging results over the last 24 hours.     Physical Exam   Temp: 97.6  F (36.4  C) Temp src: Temporal BP: (!) 143/80 Pulse: 59   Resp: 18 SpO2: 93 % O2 Device: None (Room air)    Vitals:    01/22/21 1442 01/25/21 1729 02/01/21 0623   Weight: 140.3 kg (309 lb 3.2 oz) 147.9 kg (326 lb 1.6 oz) 142 kg (313 lb 1.6 oz)     Vital Signs with Ranges  Temp:  [97.6  F (36.4  C)-98.5  F (36.9  C)] 97.6  F (36.4  C)  Pulse:  [59-71] 59  Resp:  [10-20] 18  BP: (122-143)/(64-80) 143/80  SpO2:  [92 %-98 %] 93 %  I/O last 3 completed shifts:  In: 1957 [P.O.:1530; I.V.:427]  Out: 1675 [Urine:1675]    GENERAL: No apparent distress. Awake, alert, and fully oriented.  HEENT: Normocephalic, atraumatic. Extraocular  movements intact.  CARDIOVASCULAR: Regular rate and rhythm without murmurs or rubs. No S3.  PULMONARY: Clear bilaterally.  GASTROINTESTINAL: Soft, non-tender, non-distended. Bowel sounds normoactive.   EXTREMITIES: No cyanosis or clubbing. No edema.  NEUROLOGICAL: CN 2-12 grossly intact, no focal neurological deficits.  DERMATOLOGICAL: No rash, ulcer, bruising, nor jaundice.     Medications     - MEDICATION INSTRUCTIONS -       Warfarin Therapy Reminder         acetaminophen  1,000 mg Oral TID     docusate sodium  100 mg Oral BID     donepezil  20 mg Oral At Bedtime     enoxaparin ANTICOAGULANT  1 mg/kg Subcutaneous Q12H     folic acid  5 mg Oral Daily     guanFACINE  1 mg Oral At Bedtime     lamoTRIgine  25 mg Oral Daily     levothyroxine  150 mcg Oral Daily     liothyronine  25 mcg Oral Daily     memantine  10 mg Oral BID     multivitamin w/minerals  1 tablet Oral Daily     polyethylene glycol  17 g Oral Daily     pregabalin  100 mg Oral TID     propafenone  150 mg Oral Q8H     silver sulfADIAZINE   Topical BID     simvastatin  40 mg Oral At Bedtime     sodium chloride (PF)  10 mL Intracatheter Q7 Days     sodium chloride (PF)  3 mL Intracatheter Q8H     venlafaxine  300 mg Oral Daily     Data     Laboratory:  Recent Labs   Lab 02/05/21  0547 02/04/21  0640 02/01/21  0641   WBC 7.6 8.4 6.8   HGB 12.2* 11.8* 11.9*   HCT 39.4* 37.1* 38.5*   * 99 100    258 247     Recent Labs   Lab 02/05/21  0547 02/04/21  0640 02/01/21  0641    138 139   POTASSIUM 4.0 4.1 4.8   CHLORIDE 105 105 105   CO2 31 32 31   ANIONGAP 3 1* 3   GLC 90 110* 90   BUN 24 21 21   CR 1.04 1.12 1.11   GFRESTIMATED 75 69 70   GFRESTBLACK 87 80 81   CATERINA 9.1 9.1 9.0     No results for input(s): CULT in the last 168 hours.    Imaging:  No results found for this or any previous visit (from the past 24 hour(s)).      Aurelio Etienne DO MPH  [facility] Hospitalist  [address] [place] [address]  Pager: [phone]  02/05/2021

## 2021-02-05 NOTE — PLAN OF CARE
Pain controlled with po meds. AFO brace on at all times. Up with 1 assist & walker= walked in ayala. Pravin regular diet. Voiding without difficulty- incont at times. Dried draiange on ace bandage graft site. Wound vac intact LLE.

## 2021-02-05 NOTE — PROGRESS NOTES
Care Management Follow Up    Length of Stay (days): 14    Expected Discharge Date: 02/11/21     Concerns to be Addressed: discharge planning     Patient plan of care discussed at interdisciplinary rounds: Yes    Anticipated Discharge Disposition: Transitional Care, [facility]     Anticipated Discharge Services:  PT  Anticipated Discharge DME:   Patient/family educated on Medicare website which has current facility and service quality ratings: yes  Education Provided on the Discharge Plan:    Patient/Family in Agreement with the Plan: yes    Referrals Placed by CM/SW: Post Acute Facilities  Private pay costs discussed: transportation costs    Additional Information:  [facility] has accepted pt for Thursday 2/11 with the expectation that pts wound vac will be removed on 2/10, according to WOC and Plastic Surgery notes. Pts wife has been updated. Will continue to follow with discharge planning.    Brittnee Castro RN, BSN CTS  Care Coordinator  [facility]   [phone]

## 2021-02-05 NOTE — PROGRESS NOTES
[facility] Nurse Inpatient Wound Assessment   Reason for consultation: Evaluate and treat  LLE wounds    Assessment  LLE wounds due to Trauma  Patient now s/p skin graft and Prevena VAC dressing application on 2/3/21.  Site is intact.  Leave current dressing on until 2/10/21  Treatment Plan  LLE wounds:  Prevena VAC-leave in place until 2/10/21   Orders Reviewed  Recommended provider order: None, at this time  WO Nurse follow-up plan:prn  Nursing to notify the Provider(s) and re-consult the [facility] Nurse if wound(s) deteriorates or new skin concern.    Patient History  According to provider note(s):  Todd S Aschoff is a 65 year old male who has a past medical history of Alcohol dependence, Allergy, Aortic valve prosthesis, Atrial fibrillation, BPH, Chronic pain,  Dissection of aorta, thoracic  (1992), Headache, Heart murmur, History of spinal cord injury, Low back pain, Major depression, Mixed hyperlipidemia, Numbness and tingling, OA (osteoarthritis), Obesity, Sciatica, and hypothyroidism among others.  He was recently discharged after incision and drainage of infected hematoma in the left lower extremity, patient has been recovering at home for that.  He presents with confusion, generalized weakness and very low blood pressure.  Evidence of dehydration on physical exam, acute kidney injury and quick response to volume expansion and fluid resuscitation in the emergency department.    Objective Data  Containment of urine/stool: Continent of bladder and Continent of bowel    Active Diet Order  Orders Placed This Encounter      Regular Diet Adult      Output:   I/O last 3 completed shifts:  In: 1957 [P.O.:1530; I.V.:427]  Out: 1675 [Urine:1675]    Risk Assessment:   Sensory Perception: 3-->slightly limited  Moisture: 3-->occasionally moist  Activity: 3-->walks occasionally  Mobility: 3-->slightly limited  Nutrition: 4-->excellent  Friction and Shear: 2-->potential problem  Ascencion Score: 18                          Labs:    Recent Labs   Lab 02/05/21  0547   HGB 12.2*   INR 1.02   WBC 7.6       Physical Exam  Areas of skin assessed: focused LLE    Wound Location:  LLE  Date of last photo 2/1/21  Wound History: Patient injured leg after falling in December.  Required I&D on 1/10/21 for an infected hematoma.  Patient had debridement 1/26/21.  Dressing is intact with no signs of leaking.  Patient reports no pain to site.  All questions answered.  Orders updated.  10 minutes education.     Interventions  Visual inspection and assessment completed   Wound Care Rationale Promote moist wound healing without tissue dehydration  and Gently fill dead space to prevent abscess formation   Wound Care: done per plan of care  Supplies: gathered and placed at the bedside  Current off-loading measures: Pillows  Current support surface: Standard  Atmos Air mattress  Education provided to: plan of care  Discussed plan of care with Patient and Nurse    Torin Hand RN CWOCN

## 2021-02-06 ENCOUNTER — APPOINTMENT (OUTPATIENT)
Dept: OCCUPATIONAL THERAPY | Facility: CLINIC | Age: 66
End: 2021-02-06
Payer: COMMERCIAL

## 2021-02-06 ENCOUNTER — APPOINTMENT (OUTPATIENT)
Dept: PHYSICAL THERAPY | Facility: CLINIC | Age: 66
End: 2021-02-06
Payer: COMMERCIAL

## 2021-02-06 LAB
ERYTHROCYTE [DISTWIDTH] IN BLOOD BY AUTOMATED COUNT: 13.8 % (ref 10–15)
HCT VFR BLD AUTO: 40.1 % (ref 40–53)
HGB BLD-MCNC: 12.6 G/DL (ref 13.3–17.7)
INR PPP: 1.18 (ref 0.86–1.14)
MCH RBC QN AUTO: 31.5 PG (ref 26.5–33)
MCHC RBC AUTO-ENTMCNC: 31.4 G/DL (ref 31.5–36.5)
MCV RBC AUTO: 100 FL (ref 78–100)
PLATELET # BLD AUTO: 237 10E9/L (ref 150–450)
RBC # BLD AUTO: 4 10E12/L (ref 4.4–5.9)
WBC # BLD AUTO: 6.7 10E9/L (ref 4–11)

## 2021-02-06 PROCEDURE — 250N000011 HC RX IP 250 OP 636: Performed by: HOSPITALIST

## 2021-02-06 PROCEDURE — 36415 COLL VENOUS BLD VENIPUNCTURE: CPT | Performed by: HOSPITALIST

## 2021-02-06 PROCEDURE — 97530 THERAPEUTIC ACTIVITIES: CPT | Mod: GO | Performed by: REHABILITATION PRACTITIONER

## 2021-02-06 PROCEDURE — 85610 PROTHROMBIN TIME: CPT | Performed by: HOSPITALIST

## 2021-02-06 PROCEDURE — 250N000013 HC RX MED GY IP 250 OP 250 PS 637: Performed by: STUDENT IN AN ORGANIZED HEALTH CARE EDUCATION/TRAINING PROGRAM

## 2021-02-06 PROCEDURE — 250N000013 HC RX MED GY IP 250 OP 250 PS 637: Performed by: HOSPITALIST

## 2021-02-06 PROCEDURE — 97530 THERAPEUTIC ACTIVITIES: CPT | Mod: GP | Performed by: PHYSICAL THERAPIST

## 2021-02-06 PROCEDURE — 99233 SBSQ HOSP IP/OBS HIGH 50: CPT | Performed by: HOSPITALIST

## 2021-02-06 PROCEDURE — 250N000013 HC RX MED GY IP 250 OP 250 PS 637: Performed by: INTERNAL MEDICINE

## 2021-02-06 PROCEDURE — 85027 COMPLETE CBC AUTOMATED: CPT | Performed by: HOSPITALIST

## 2021-02-06 PROCEDURE — 97116 GAIT TRAINING THERAPY: CPT | Mod: GP | Performed by: PHYSICAL THERAPIST

## 2021-02-06 PROCEDURE — 120N000001 HC R&B MED SURG/OB

## 2021-02-06 RX ORDER — WARFARIN SODIUM 7.5 MG/1
7.5 TABLET ORAL
Status: COMPLETED | OUTPATIENT
Start: 2021-02-06 | End: 2021-02-06

## 2021-02-06 RX ADMIN — SIMVASTATIN 40 MG: 40 TABLET, FILM COATED ORAL at 22:26

## 2021-02-06 RX ADMIN — PROPAFENONE HYDROCHLORIDE 150 MG: 150 TABLET, FILM COATED ORAL at 22:26

## 2021-02-06 RX ADMIN — ENOXAPARIN SODIUM 150 MG: 150 INJECTION SUBCUTANEOUS at 09:37

## 2021-02-06 RX ADMIN — WARFARIN SODIUM 7.5 MG: 7.5 TABLET ORAL at 17:41

## 2021-02-06 RX ADMIN — DOCUSATE SODIUM 100 MG: 100 CAPSULE, LIQUID FILLED ORAL at 20:21

## 2021-02-06 RX ADMIN — VENLAFAXINE HYDROCHLORIDE 300 MG: 150 CAPSULE, EXTENDED RELEASE ORAL at 09:38

## 2021-02-06 RX ADMIN — PREGABALIN 100 MG: 100 CAPSULE ORAL at 20:21

## 2021-02-06 RX ADMIN — MULTIPLE VITAMINS W/ MINERALS TAB 1 TABLET: TAB at 09:38

## 2021-02-06 RX ADMIN — OXYCODONE HYDROCHLORIDE 5 MG: 5 TABLET ORAL at 22:26

## 2021-02-06 RX ADMIN — MEMANTINE 10 MG: 5 TABLET ORAL at 20:21

## 2021-02-06 RX ADMIN — ACETAMINOPHEN 1000 MG: 500 TABLET, FILM COATED ORAL at 09:38

## 2021-02-06 RX ADMIN — LEVOTHYROXINE SODIUM 150 MCG: 150 TABLET ORAL at 05:56

## 2021-02-06 RX ADMIN — PROPAFENONE HYDROCHLORIDE 150 MG: 150 TABLET, FILM COATED ORAL at 14:44

## 2021-02-06 RX ADMIN — GUANFACINE 1 MG: 1 TABLET ORAL at 22:26

## 2021-02-06 RX ADMIN — ACETAMINOPHEN 1000 MG: 500 TABLET, FILM COATED ORAL at 14:44

## 2021-02-06 RX ADMIN — LAMOTRIGINE 25 MG: 25 TABLET ORAL at 09:39

## 2021-02-06 RX ADMIN — DONEPEZIL HYDROCHLORIDE 20 MG: 10 TABLET ORAL at 22:28

## 2021-02-06 RX ADMIN — ACETAMINOPHEN 1000 MG: 500 TABLET, FILM COATED ORAL at 20:21

## 2021-02-06 RX ADMIN — PREGABALIN 100 MG: 100 CAPSULE ORAL at 09:39

## 2021-02-06 RX ADMIN — PREGABALIN 100 MG: 100 CAPSULE ORAL at 14:44

## 2021-02-06 RX ADMIN — OXYCODONE HYDROCHLORIDE 5 MG: 5 TABLET ORAL at 15:48

## 2021-02-06 RX ADMIN — PROPAFENONE HYDROCHLORIDE 150 MG: 150 TABLET, FILM COATED ORAL at 05:56

## 2021-02-06 RX ADMIN — FOLIC ACID 5 MG: 1 TABLET ORAL at 09:38

## 2021-02-06 RX ADMIN — LIOTHYRONINE SODIUM 25 MCG: 25 TABLET ORAL at 05:56

## 2021-02-06 RX ADMIN — ENOXAPARIN SODIUM 150 MG: 150 INJECTION SUBCUTANEOUS at 20:21

## 2021-02-06 RX ADMIN — MEMANTINE 10 MG: 5 TABLET ORAL at 09:38

## 2021-02-06 ASSESSMENT — ACTIVITIES OF DAILY LIVING (ADL)
ADLS_ACUITY_SCORE: 23
ADLS_ACUITY_SCORE: 22
ADLS_ACUITY_SCORE: 22
ADLS_ACUITY_SCORE: 23
ADLS_ACUITY_SCORE: 23
ADLS_ACUITY_SCORE: 22

## 2021-02-06 NOTE — PLAN OF CARE
Pt is alert and oriented, up with assist of 1, walker/gait belt. Pt with wound vac to left lower leg, intact and set for 125 mmHg of suction. Pt voiding without difficulty. Pt taking prn oxycodone for pain. Donor site to left upper thigh intact with some drainage present.

## 2021-02-06 NOTE — PLAN OF CARE
Day RN  VS monitored, denies pain, pt more tired today and slept between cares, up w/A1 and ww, voiding, large bm today, wound vac running at 125, drsg C/D/I  ex small dried drainage L thigh region (donor site), midline removed today, tentative plan for wound vac removed 2/10 and discharge to TCU 2/11, will con to monitor.

## 2021-02-06 NOTE — PROGRESS NOTES
[facility]    Hospitalist Progress Note  Name: Todd S Aschoff    MRN: [id number]  Provider:  Aurelio Etienne DO MPH  Date of Service: 02/06/2021    Summary of Stay: Todd S Aschoff is a 65 year old male with past medical history of aortic dissection status post mechanical aortic valve plus graft (1992), atrial fibrillation (chronic anticoagulation with Coumadin), chronic pain, dementia and depression who was hospitalized 1/9-1/13 here for left leg wound with drainage (initially caused by a fall approximately 1 month prior) which was found to be infected hematoma requiring I&D (1/10) and ultimately discharged home on oral antibiotics who was admitted on 1/22/2021 with generalized weakness, confusion and hypotension.  Patient was found to have acute CVA but no longer has any focal deficits.  Also likely had a seizure prior to admission.       Patient underwent surgery with plastics on 1/26 with removal of 1400 mL of hematoma (no active bleeding) and placement of wound vac.  Wound improving so wound vac changed at the bedside on 1/28.  Has been undergoing [facility] wound vac changes, Dr. Plascencia took the patient to the operating room on Wednesday, 2/3 for skin graft.  Plan to keep the patient here for 5 days after his skin graft for wound cares etc.       Problem List/Assessment and Plan:   Acute right frontal infarct, suspect this is a silent infarct as he has no neurological deficits: Patient did undergo CT of the head which showed age indeterminate right occipital infarct.  MRI of the brain showed an acute infarct in the right frontal vertex and multiple chronic infarcts in the right occipital lobe and bilateral cerebellar hemispheres.  MRA shows possible diffuse stenosis of the left PCA.  TTE showed no change from prior.  He has no neurological deficits.  -Stroke neurology consulted, appreciate recommendations.  -Continue simvastatin 40 mg daily.  -Continue Coumadin as PTA, bridging with heparin  perioperatively.  -Follow-up with PCP in 1 to 2 weeks, follow-up with neurology in clinic in 8 weeks.     Suspected seizure: Patient's wife describes seizure-like activity prior to the events that led to his hospitalization.  He had a right arm shaking and unresponsiveness.  He does have an abnormal MRI as above which certainly could serve as a seizure focus.  Neurology recommended starting lamotrigine. No further seizure activity.  -Seizure precautions.  -General neurology consulted, appreciate recommendations.  -Started lamotrigine 25 mg daily on 1/24.  -Need to follow-up with general neurology in 1-2 weeks (at that time lamotrigine can be increased to 50 mg daily and EEG can be done) -given the plan to keep the patient here for 5 days after surgery may need to touch base with Neurology prior to discharge to see if the Lamotrigine needs to be increased before his discharge.  -No driving, climbing, tub baths or potential dangerous activity in the next 3 months.     Left lower extremity infected hematoma status post I&D on 1/10, nonhealing with associated cellulitis s/p Large hematoma evaluation and placement of wound vac on 1/26.  S/p fall in December 2020.  During his last admission was found to have infected hematoma.  Underwent I&D with Ortho on 1/10.  His wound culture grew E. coli and Enterococcus faecalis.  ID did follow throughout that admission and felt that the pathogen was E. coli.  He was treated with IV ceftriaxone and at time of discharge cefuroxime for an additional 7 days.  He has had dehiscence of the lower wound with surrounding necrosis.  He underwent I&D with evacuation of 1400 mL hematoma (no active bleeding) on 1/26 with placement of wound vac.  Wound improving after this procedure per Dr. Plascencia.  Return to the OR on 2/3 for split thickness skin graft and closure with ongoing wound VAC.  -Orthopedics consulted, appreciate recommendations.   -Plastic surgery consulted, appreciate  recommendations.  -Wound vac management for 5 days.  -Completed ceftriaxone 7 days on 1/29/21.  -Wound nurse consulted, appreciate recommendations.     Acute toxic/metabolic encephalopathy - resolved: Presented with diffuse weakness and significant confusion.  He had significant hypotension.  With hydration his mental status did improve.  I suspect his confusion was multifactorial due to likely seizure, hypotension, acute kidney injury.       Acute kidney injury.  Improved.  Baseline creatinine approximately 1.  On admission creatinine was elevated at 1.58, peaked 1.66.  He is urinating.  He was quite hypotensive on admission which could have lead to ATN causing his renal insufficiency.   -No NSAIDs  -Periodic BMP.     Left Shoulder Pain: Patient had pain in the left shoulder (fall/seizure PTA as above).  Orthopedics consulted.  He has no fracture but does have left RC tendinitis with AC joint arthrosis and probable loose bodies.  He underwent left shoulder subacromial injection on 2/25 and feels that this has helped his pain.  -Follow-up with [facility] in outpatient setting for further work-up if persistent pain/decreased mobility     Hypotension - resolved: Patient had rather profound hypotension on presentation.  This improved significantly with fluids.  Blood pressure now is stable.     Chronic anticoagulation with warfarin for mechanical aortic valve and atrial fibrillation: INR was supratherapeutic at 6.9 (1/23).  INR reversed for surgery with Vitamin K.  He was subsequently started on heparin drip in the perioperative setting.  His goal INR level is 2.5-3.5.    -Heparin drip perioperatively but hemoglobin stable.  -Restarted coumadin and bridge post-op.  -Transitioned to Lovenox.     Acute Anemia: Historically Hgb had been normal, decreased to 12 range during his last hospitalization.  This admission was 12.8 on admission, has decreased to 11.3 but stable over the past few days.  Did have large hematoma evacuated  of the LLE on 1/26 as above, no active bleeding.  Iron checked and normal. No indication for transfusion at this time.  -Daily Hgb.     Atrial fibrillation: Resumed the patient on his propafenone.  Anticoagulation plan as above.     Chronic pain: Patient is on Lyrica.  As needed oxycodone also available in addition to Tylenol.     Hypothyroidism: Patient is on levothyroxine and liothyronine.     Depression: Continue Effexor.     Dementia: Continue prior to admission Namenda and Aricept.     Weakness: PT/OT consulted.  Have recommended TCU which patient and his wife are agreeable.    DVT Prophylaxis: Warfarin, Lovenox.  Code Status: Full Code  Diet: Snacks/Supplements Adult: Boost Shake; Between Meals  Regular Diet Adult    Zimmer Catheter: not present  Disposition: Expected discharge in 3-4 days to home vs TCU. Goals prior to discharge include manage post op issues and anticoagulation.   Incidental Findings: None.  Family updated today: Will call wife this afternoon.     Interval History   The patient reports doing well. No chest pain or shortness of breath. No nausea, vomiting, diarrhea, constipation. No fevers. No other specific complaints identified.     -Data reviewed today: I personally reviewed all new labs and imaging results over the last 24 hours.     Physical Exam   Temp: 97.7  F (36.5  C) Temp src: Temporal BP: (!) 153/85 Pulse: 64   Resp: 16 SpO2: 95 % O2 Device: None (Room air)    Vitals:    01/22/21 1442 01/25/21 1729 02/01/21 0623   Weight: 140.3 kg (309 lb 3.2 oz) 147.9 kg (326 lb 1.6 oz) 142 kg (313 lb 1.6 oz)     Vital Signs with Ranges  Temp:  [97.5  F (36.4  C)-98.8  F (37.1  C)] 97.7  F (36.5  C)  Pulse:  [64-72] 64  Resp:  [16-18] 16  BP: (136-153)/(79-93) 153/85  SpO2:  [95 %-98 %] 95 %  I/O last 3 completed shifts:  In: 16.6 [I.V.:16.6]  Out: 1950 [Urine:1950]    GENERAL: No apparent distress. Awake, alert, and fully oriented.  HEENT: Normocephalic, atraumatic. Extraocular movements  intact.  CARDIOVASCULAR: Regular rate and rhythm without murmurs or rubs. No S3.  PULMONARY: Clear bilaterally.  GASTROINTESTINAL: Soft, non-tender, non-distended. Bowel sounds normoactive.   EXTREMITIES: No cyanosis or clubbing. No edema.  NEUROLOGICAL: CN 2-12 grossly intact, no focal neurological deficits.  DERMATOLOGICAL: No rash, ulcer, bruising, nor jaundice.     Medications     - MEDICATION INSTRUCTIONS -       Warfarin Therapy Reminder         acetaminophen  1,000 mg Oral TID     docusate sodium  100 mg Oral BID     donepezil  20 mg Oral At Bedtime     enoxaparin ANTICOAGULANT  1 mg/kg Subcutaneous Q12H     folic acid  5 mg Oral Daily     guanFACINE  1 mg Oral At Bedtime     lamoTRIgine  25 mg Oral Daily     levothyroxine  150 mcg Oral Daily     liothyronine  25 mcg Oral Daily     memantine  10 mg Oral BID     multivitamin w/minerals  1 tablet Oral Daily     polyethylene glycol  17 g Oral Daily     pregabalin  100 mg Oral TID     propafenone  150 mg Oral Q8H     silver sulfADIAZINE   Topical BID     simvastatin  40 mg Oral At Bedtime     sodium chloride (PF)  10 mL Intracatheter Q7 Days     sodium chloride (PF)  3 mL Intracatheter Q8H     venlafaxine  300 mg Oral Daily     Data     Laboratory:  Recent Labs   Lab 02/06/21  0611 02/05/21  0547 02/04/21  0640   WBC 6.7 7.6 8.4   HGB 12.6* 12.2* 11.8*   HCT 40.1 39.4* 37.1*    101* 99    243 258     Recent Labs   Lab 02/05/21  0547 02/04/21  0640 02/01/21  0641    138 139   POTASSIUM 4.0 4.1 4.8   CHLORIDE 105 105 105   CO2 31 32 31   ANIONGAP 3 1* 3   GLC 90 110* 90   BUN 24 21 21   CR 1.04 1.12 1.11   GFRESTIMATED 75 69 70   GFRESTBLACK 87 80 81   CATERINA 9.1 9.1 9.0     No results for input(s): CULT in the last 168 hours.    Imaging:  No results found for this or any previous visit (from the past 24 hour(s)).      Aurelio Etienne DO MPH  Formerly [facility] Hospitalist  [address] [place] [address]  Pager: [phone]  02/06/2021

## 2021-02-06 NOTE — PLAN OF CARE
Evening RN     Patient vital signs are at baseline: Yes  Patient able to ambulate as they were prior to admission or with assist devices provided by therapies during their stay:  Yes  Patient MUST void prior to discharge:  Yes  Patient able to tolerate oral intake:  Yes  Pain has adequate pain control using Oral analgesics:  Yes     Pt A/O x4.  VSS and afebrile.  Pain managed adequately with scheduled PO Tylenol and PRN PO oxycodone.  CMS intact - pt denied numbness or tingling, LE's were cool to the touch.  Dressing CDI.  Wound vac in place and to continous suction at 125 - no output this shift.  Up A1 with walker,  boot and gait belt.  Voiding adequately.  Warfarin and lovenox for blood thinner bridge.  Tolerating regular diet well.  Contact precautions maintained.  Plan is TCU on discharge.  Will continue to monitor.

## 2021-02-06 NOTE — PROGRESS NOTES
"Patient doing well this morning. Reports minimal pain. VAC appeared to not be holding suction with a faulty hospital VAC machine. Therefore I removed it.      /82 (BP Location: Left arm)   Pulse 69   Temp 98.3  F (36.8  C) (Temporal)   Resp 16   Ht 1.854 m (6' 1\")   Wt 142 kg (313 lb 1.6 oz)   SpO2 92%   BMI 41.31 kg/m      Donor site in tact. Healing well. No infectin  Distal closure is healing well without signs of dehiscence, infection or fluid collection beneath  The skin graft site is healing well and appears to have nearly 100% take.      Assessment & Plan    POD#3 S/P STSG from L Thigh to L Lower Extremity and Closure of Distal Leg Wound. He is progressing well.     Would recommend resuming his anticoagulation at this point.    Patient may be weight bearing as tolerated with the boot for 5 weeks    Please apply xeroform to skin graft site twice daily and OK to cover the closed incision proximal to the skin graft as well. Keep tegaderm over the proximal thigh skin graft donor site. Do not remove. OK To reinforce with abdominal pads. Change these abdominal pads twice daily which should be placed over the exisiting tegaderm.    Wrap leg from distal to proximal with kerlix followed by gentle ace wrap include foot and thigh.    Staples may be removed from incision and the skin graft site 3 weeks postop.     Patient should follow-up with Dr. Plascencia in clinic between Feb 23 and March 2. Schedule appointment by calling [phone]  "

## 2021-02-07 ENCOUNTER — APPOINTMENT (OUTPATIENT)
Dept: OCCUPATIONAL THERAPY | Facility: CLINIC | Age: 66
End: 2021-02-07
Payer: COMMERCIAL

## 2021-02-07 LAB — INR PPP: 1.44 (ref 0.86–1.14)

## 2021-02-07 PROCEDURE — 97530 THERAPEUTIC ACTIVITIES: CPT | Mod: GO | Performed by: REHABILITATION PRACTITIONER

## 2021-02-07 PROCEDURE — 250N000013 HC RX MED GY IP 250 OP 250 PS 637: Performed by: STUDENT IN AN ORGANIZED HEALTH CARE EDUCATION/TRAINING PROGRAM

## 2021-02-07 PROCEDURE — 85610 PROTHROMBIN TIME: CPT | Performed by: HOSPITALIST

## 2021-02-07 PROCEDURE — 99239 HOSP IP/OBS DSCHRG MGMT >30: CPT | Performed by: HOSPITALIST

## 2021-02-07 PROCEDURE — 250N000011 HC RX IP 250 OP 636: Performed by: HOSPITALIST

## 2021-02-07 PROCEDURE — 250N000013 HC RX MED GY IP 250 OP 250 PS 637: Performed by: INTERNAL MEDICINE

## 2021-02-07 PROCEDURE — 99207 PR CDG-CODE CATEGORY CHANGED: CPT | Performed by: HOSPITALIST

## 2021-02-07 PROCEDURE — 36415 COLL VENOUS BLD VENIPUNCTURE: CPT | Performed by: HOSPITALIST

## 2021-02-07 PROCEDURE — 250N000013 HC RX MED GY IP 250 OP 250 PS 637: Performed by: HOSPITALIST

## 2021-02-07 PROCEDURE — 120N000001 HC R&B MED SURG/OB

## 2021-02-07 RX ORDER — LAMOTRIGINE 25 MG/1
25 TABLET ORAL DAILY
DISCHARGE
Start: 2021-02-08 | End: 2021-02-22

## 2021-02-07 RX ORDER — PREGABALIN 100 MG/1
100 CAPSULE ORAL 3 TIMES DAILY
Qty: 30 CAPSULE | Refills: 0 | Status: SHIPPED | OUTPATIENT
Start: 2021-02-07 | End: 2021-04-01

## 2021-02-07 RX ORDER — WARFARIN SODIUM 7.5 MG/1
7.5 TABLET ORAL
Status: COMPLETED | OUTPATIENT
Start: 2021-02-07 | End: 2021-02-07

## 2021-02-07 RX ORDER — OXYCODONE HYDROCHLORIDE 5 MG/1
5 TABLET ORAL EVERY 4 HOURS PRN
Qty: 15 TABLET | Refills: 0 | Status: SHIPPED | OUTPATIENT
Start: 2021-02-07 | End: 2021-04-18

## 2021-02-07 RX ORDER — SILVER SULFADIAZINE 10 MG/G
CREAM TOPICAL 2 TIMES DAILY
DISCHARGE
Start: 2021-02-07 | End: 2021-04-18

## 2021-02-07 RX ORDER — ACETAMINOPHEN 500 MG
500-1000 TABLET ORAL EVERY 8 HOURS PRN
DISCHARGE
Start: 2021-02-07 | End: 2021-06-18

## 2021-02-07 RX ADMIN — OXYCODONE HYDROCHLORIDE 5 MG: 5 TABLET ORAL at 22:25

## 2021-02-07 RX ADMIN — PROPAFENONE HYDROCHLORIDE 150 MG: 150 TABLET, FILM COATED ORAL at 22:00

## 2021-02-07 RX ADMIN — DOCUSATE SODIUM 100 MG: 100 CAPSULE, LIQUID FILLED ORAL at 22:00

## 2021-02-07 RX ADMIN — MEMANTINE 10 MG: 5 TABLET ORAL at 09:28

## 2021-02-07 RX ADMIN — MEMANTINE 10 MG: 5 TABLET ORAL at 22:00

## 2021-02-07 RX ADMIN — PREGABALIN 100 MG: 100 CAPSULE ORAL at 09:27

## 2021-02-07 RX ADMIN — LEVOTHYROXINE SODIUM 150 MCG: 150 TABLET ORAL at 07:09

## 2021-02-07 RX ADMIN — PREGABALIN 100 MG: 100 CAPSULE ORAL at 22:00

## 2021-02-07 RX ADMIN — ACETAMINOPHEN 1000 MG: 500 TABLET, FILM COATED ORAL at 22:00

## 2021-02-07 RX ADMIN — ENOXAPARIN SODIUM 150 MG: 150 INJECTION SUBCUTANEOUS at 09:27

## 2021-02-07 RX ADMIN — PROPAFENONE HYDROCHLORIDE 150 MG: 150 TABLET, FILM COATED ORAL at 14:47

## 2021-02-07 RX ADMIN — ENOXAPARIN SODIUM 150 MG: 150 INJECTION SUBCUTANEOUS at 21:59

## 2021-02-07 RX ADMIN — PREGABALIN 100 MG: 100 CAPSULE ORAL at 14:47

## 2021-02-07 RX ADMIN — LAMOTRIGINE 25 MG: 25 TABLET ORAL at 09:27

## 2021-02-07 RX ADMIN — GUANFACINE 1 MG: 1 TABLET ORAL at 22:00

## 2021-02-07 RX ADMIN — WARFARIN SODIUM 7.5 MG: 7.5 TABLET ORAL at 17:58

## 2021-02-07 RX ADMIN — SIMVASTATIN 40 MG: 40 TABLET, FILM COATED ORAL at 22:00

## 2021-02-07 RX ADMIN — ACETAMINOPHEN 1000 MG: 500 TABLET, FILM COATED ORAL at 14:47

## 2021-02-07 RX ADMIN — VENLAFAXINE HYDROCHLORIDE 300 MG: 150 CAPSULE, EXTENDED RELEASE ORAL at 09:28

## 2021-02-07 RX ADMIN — FOLIC ACID 5 MG: 1 TABLET ORAL at 09:27

## 2021-02-07 RX ADMIN — MULTIPLE VITAMINS W/ MINERALS TAB 1 TABLET: TAB at 09:28

## 2021-02-07 RX ADMIN — DONEPEZIL HYDROCHLORIDE 20 MG: 10 TABLET ORAL at 21:59

## 2021-02-07 RX ADMIN — ACETAMINOPHEN 1000 MG: 500 TABLET, FILM COATED ORAL at 09:28

## 2021-02-07 RX ADMIN — PROPAFENONE HYDROCHLORIDE 150 MG: 150 TABLET, FILM COATED ORAL at 05:33

## 2021-02-07 RX ADMIN — LIOTHYRONINE SODIUM 25 MCG: 25 TABLET ORAL at 07:09

## 2021-02-07 ASSESSMENT — ACTIVITIES OF DAILY LIVING (ADL)
ADLS_ACUITY_SCORE: 23
ADLS_ACUITY_SCORE: 23
ADLS_ACUITY_SCORE: 22
ADLS_ACUITY_SCORE: 23
ADLS_ACUITY_SCORE: 22
ADLS_ACUITY_SCORE: 23

## 2021-02-07 NOTE — PLAN OF CARE
Day RN  VS monitored, denies pain, up w/A1 and gb and ww, new drsg applied to L lower calf site, new abd placed on skin graft site, mod serosanguinous drainage under tegaderm of skin graft, Contact ISO cont, CMS+, voiding, plan to discharge to TCU Monday as long as plastics signs-off, wife updated via phone, will cont to monitor.

## 2021-02-07 NOTE — PROGRESS NOTES
[facility]    Hospitalist Progress Note  Name: Todd S Aschoff    MRN: [id number]  Provider:  Aurelio Etienne DO, MPH  Date of Service: 02/07/2021    Summary of Stay: Todd S Aschoff is a 65 year old male with past medical history of aortic dissection status post mechanical aortic valve plus graft (1992), atrial fibrillation (chronic anticoagulation with Coumadin), chronic pain, dementia and depression who was hospitalized 1/9-1/13 here for left leg wound with drainage (initially caused by a fall approximately 1 month prior) which was found to be infected hematoma requiring I&D (1/10) and ultimately discharged home on oral antibiotics who was admitted on 1/22/2021 with generalized weakness, confusion and hypotension.  Patient was found to have acute CVA but no longer has any focal deficits.  Also likely had a seizure prior to admission.       Patient underwent surgery with plastics on 1/26 with removal of 1400 mL of hematoma (no active bleeding) and placement of wound vac.  Wound improving so wound vac changed at the bedside on 1/28.  Has been undergoing [facility] wound vac changes, Dr. Plascencia took the patient to the operating room on Wednesday, 2/3 for skin graft.  Wound vac not functional yesterday so it was discontinued.  Medically stable for discharge to TCU when available.       Problem List/Assessment and Plan:   Acute right frontal infarct, suspect this is a silent infarct as he has no neurological deficits: Patient did undergo CT of the head which showed age indeterminate right occipital infarct.  MRI of the brain showed an acute infarct in the right frontal vertex and multiple chronic infarcts in the right occipital lobe and bilateral cerebellar hemispheres.  MRA shows possible diffuse stenosis of the left PCA.  TTE showed no change from prior.  He has no neurological deficits.  -Stroke neurology consulted, appreciate recommendations.  -Continue simvastatin 40 mg daily.  -Continue Coumadin as PTA,  bridging with heparin perioperatively.  -Follow-up with PCP in 1 to 2 weeks, follow-up with neurology in clinic in 8 weeks.     Suspected seizure: Patient's wife describes seizure-like activity prior to the events that led to his hospitalization.  He had a right arm shaking and unresponsiveness.  He does have an abnormal MRI as above which certainly could serve as a seizure focus.  Neurology recommended starting lamotrigine. No further seizure activity.  -Seizure precautions.  -General neurology consulted, appreciate recommendations.  -Started lamotrigine 25 mg daily on 1/24.  -Need to follow-up with general neurology in 1-2 weeks (at that time lamotrigine can be increased to 50 mg daily and EEG can be done) -given the plan to keep the patient here for 5 days after surgery may need to touch base with Neurology prior to discharge to see if the Lamotrigine needs to be increased before his discharge.  -No driving, climbing, tub baths or potential dangerous activity in the next 3 months.     Left lower extremity infected hematoma status post I&D on 1/10, nonhealing with associated cellulitis s/p Large hematoma evaluation and placement of wound vac on 1/26.  S/p fall in December 2020.  During his last admission was found to have infected hematoma.  Underwent I&D with Ortho on 1/10.  His wound culture grew E. coli and Enterococcus faecalis.  ID did follow throughout that admission and felt that the pathogen was E. coli.  He was treated with IV ceftriaxone and at time of discharge cefuroxime for an additional 7 days.  He has had dehiscence of the lower wound with surrounding necrosis.  He underwent I&D with evacuation of 1400 mL hematoma (no active bleeding) on 1/26 with placement of wound vac.  Wound improving after this procedure per Dr. Plascencia.  Return to the OR on 2/3 for split thickness skin graft and closure with ongoing wound VAC.  -Orthopedics consulted, appreciate recommendations.   -Plastic surgery consulted,  appreciate recommendations.  -Wound vac removed 2/6.  -Apply xeroform to skin graft site twice daily and OK to cover the closed incision proximal to the skin graft as well.  Keep tegaderm over the proximal thigh skin graft donor site.  Do not remove.  OK To reinforce with abdominal pads.  Change these abdominal pads twice daily which should be placed over the exisiting tegaderm.  -Wrap leg from distal to proximal with kerlix followed by gentle ace wrap include foot and thigh.  -Staples may be removed from incision and the skin graft site 3 weeks postop.   -Patient should follow-up with Dr. Plascencia in clinic between Feb 23 and March 2. Schedule appointment by calling [phone]  -Completed ceftriaxone 7 days on 1/29/21.  -Wound nurse consulted, appreciate recommendations.     Acute toxic/metabolic encephalopathy - resolved: Presented with diffuse weakness and significant confusion.  He had significant hypotension.  With hydration his mental status did improve.  I suspect his confusion was multifactorial due to likely seizure, hypotension, acute kidney injury.       Acute kidney injury.  Improved.  Baseline creatinine approximately 1.  On admission creatinine was elevated at 1.58, peaked 1.66.  He is urinating.  He was quite hypotensive on admission which could have lead to ATN causing his renal insufficiency.   -No NSAIDs  -Periodic BMP.     Left Shoulder Pain: Patient had pain in the left shoulder (fall/seizure PTA as above).  Orthopedics consulted.  He has no fracture but does have left RC tendinitis with AC joint arthrosis and probable loose bodies.  He underwent left shoulder subacromial injection on 2/25 and feels that this has helped his pain.  -Follow-up with [facility] in outpatient setting for further work-up if persistent pain/decreased mobility     Hypotension - resolved: Patient had rather profound hypotension on presentation.  This improved significantly with fluids.  Blood pressure now is stable.     Chronic  anticoagulation with warfarin for mechanical aortic valve and atrial fibrillation: INR was supratherapeutic at 6.9 (1/23).  INR reversed for surgery with Vitamin K.  He was subsequently started on heparin drip in the perioperative setting.  His goal INR level is 2.5-3.5.    -Heparin drip perioperatively but hemoglobin stable.  -Restarted coumadin and bridge post-op.  -Transitioned to Lovenox.     Acute Anemia: Historically Hgb had been normal, decreased to 12 range during his last hospitalization.  This admission was 12.8 on admission, has decreased to 11.3 but stable over the past few days.  Did have large hematoma evacuated of the LLE on 1/26 as above, no active bleeding.  Iron checked and normal. No indication for transfusion at this time.  -Daily Hgb.     Atrial fibrillation: Resumed the patient on his propafenone.  Anticoagulation plan as above.     Chronic pain: Patient is on Lyrica.  As needed oxycodone also available in addition to Tylenol.     Hypothyroidism: Patient is on levothyroxine and liothyronine.     Depression: Continue Effexor.     Dementia: Continue prior to admission Namenda and Aricept.     Weakness: PT/OT consulted.  Have recommended TCU which patient and his wife are agreeable.    DVT Prophylaxis: Warfarin, Lovenox.  Code Status: Full Code  Diet: Snacks/Supplements Adult: Boost Shake; Between Meals  Regular Diet Adult    Zimmer Catheter: not present  Disposition: Expected discharge to TCU when bed available Goals prior to discharge include placement.   Incidental Findings: None.  Family updated today: Not today, patient indicated he will update his wife.     Interval History   The patient reports doing well. No chest pain or shortness of breath. No nausea, vomiting, diarrhea, constipation. No fevers. No other specific complaints identified.     Discussed with Dr Plascencia yesterday, wound vac removed.    -Data reviewed today: I personally reviewed all new labs and imaging results over the last 24  hours.     Physical Exam   Temp: 97.2  F (36.2  C) Temp src: Temporal BP: (!) 143/86 Pulse: 63   Resp: 16 SpO2: 96 % O2 Device: None (Room air)    Vitals:    01/22/21 1442 01/25/21 1729 02/01/21 0623   Weight: 140.3 kg (309 lb 3.2 oz) 147.9 kg (326 lb 1.6 oz) 142 kg (313 lb 1.6 oz)     Vital Signs with Ranges  Temp:  [97.2  F (36.2  C)-98.3  F (36.8  C)] 97.2  F (36.2  C)  Pulse:  [63-69] 63  Resp:  [16] 16  BP: (124-143)/(76-86) 143/86  SpO2:  [92 %-96 %] 96 %  I/O last 3 completed shifts:  In: 1120 [P.O.:1120]  Out: 1375 [Urine:1375]    GENERAL: No apparent distress. Awake, alert, and fully oriented.  HEENT: Normocephalic, atraumatic. Extraocular movements intact.  CARDIOVASCULAR: Regular rate and rhythm without murmurs or rubs. No S3.  PULMONARY: Clear bilaterally.  GASTROINTESTINAL: Soft, non-tender, non-distended. Bowel sounds normoactive.   EXTREMITIES: No cyanosis or clubbing. No edema.  NEUROLOGICAL: CN 2-12 grossly intact, no focal neurological deficits.  DERMATOLOGICAL: No rash, ulcer, bruising, nor jaundice.     Medications     - MEDICATION INSTRUCTIONS -       Warfarin Therapy Reminder         acetaminophen  1,000 mg Oral TID     docusate sodium  100 mg Oral BID     donepezil  20 mg Oral At Bedtime     enoxaparin ANTICOAGULANT  1 mg/kg Subcutaneous Q12H     folic acid  5 mg Oral Daily     guanFACINE  1 mg Oral At Bedtime     lamoTRIgine  25 mg Oral Daily     levothyroxine  150 mcg Oral Daily     liothyronine  25 mcg Oral Daily     memantine  10 mg Oral BID     multivitamin w/minerals  1 tablet Oral Daily     polyethylene glycol  17 g Oral Daily     pregabalin  100 mg Oral TID     propafenone  150 mg Oral Q8H     silver sulfADIAZINE   Topical BID     simvastatin  40 mg Oral At Bedtime     sodium chloride (PF)  10 mL Intracatheter Q7 Days     sodium chloride (PF)  3 mL Intracatheter Q8H     venlafaxine  300 mg Oral Daily     Data     Laboratory:  Recent Labs   Lab 02/06/21  0611 02/05/21  0536  02/04/21  0640   WBC 6.7 7.6 8.4   HGB 12.6* 12.2* 11.8*   HCT 40.1 39.4* 37.1*    101* 99    243 258     Recent Labs   Lab 02/05/21  0547 02/04/21  0640 02/01/21  0641    138 139   POTASSIUM 4.0 4.1 4.8   CHLORIDE 105 105 105   CO2 31 32 31   ANIONGAP 3 1* 3   GLC 90 110* 90   BUN 24 21 21   CR 1.04 1.12 1.11   GFRESTIMATED 75 69 70   GFRESTBLACK 87 80 81   CATERINA 9.1 9.1 9.0     No results for input(s): CULT in the last 168 hours.    Imaging:  No results found for this or any previous visit (from the past 24 hour(s)).      Aurelio Etienne DO MPH  [facility] Hospitalist  [address] [place] [address]  Pager: [phone]  02/07/2021

## 2021-02-07 NOTE — DISCHARGE SUMMARY
Hospitalist Discharge Summary  [facility]    Todd S Aschoff MRN# [id number]   YOB: 1955 Age: 65 year old     Date of Admission:  1/22/2021  Date of Discharge:  2/7/2021  Admitting Physician:  Mathew Mcfadden MD  Discharge Physician:  Aurelio Etienne DO  Discharging Service:  Hospitalist     Primary Provider: Obdulio Ingram          Discharge Diagnosis:   Acute right frontal infarct  Suspected seizure  Left lower extremity infected hematoma status post I&D x2 and skin graft nonhealing with associated cellulitis  Acute toxic metabolic encephalopathy, resolved  Acute kidney injury, resolved  Left shoulder pain, musculoskeletal  Hypotension, resolved  Acute anemia  Atrial fibrillation  Mechanical aortic valve  Chronic pain  Hypothyroidism  Depression             Discharge Disposition:   Discharged to rehabilitation facility           Allergies:   Allergies   Allergen Reactions     Gabapentin      Severe behavioral disturbances              Discharge Medications:   Current Discharge Medication List      START taking these medications    Details   lamoTRIgine (LAMICTAL) 25 MG tablet Take 1 tablet (25 mg) by mouth daily  Qty:      Associated Diagnoses: Generalized muscle weakness      silver sulfADIAZINE (SILVADENE) 1 % external cream Apply topically 2 times daily  Qty:      Associated Diagnoses: Generalized muscle weakness         CONTINUE these medications which have CHANGED    Details   acetaminophen (TYLENOL) 500 MG tablet Take 1-2 tablets (500-1,000 mg) by mouth every 8 hours as needed for mild pain    Associated Diagnoses: Wound of left lower extremity, initial encounter      enoxaparin ANTICOAGULANT (LOVENOX) 150 MG/ML syringe Inject 0.9 mLs (135 mg) Subcutaneous every 12 hours  Qty:      Associated Diagnoses: Aortic valve prosthesis present      oxyCODONE (ROXICODONE) 5 MG tablet Take 1 tablet (5 mg) by mouth every 4 hours as needed for breakthrough pain  Qty: 15 tablet,  Refills: 0    Associated Diagnoses: Wound of left lower extremity, initial encounter      pregabalin (LYRICA) 100 MG capsule Take 1 capsule (100 mg) by mouth 3 times daily Do not take if sleepy/sedated.  Qty: 30 capsule, Refills: 0    Associated Diagnoses: Chronic bilateral low back pain without sciatica         CONTINUE these medications which have NOT CHANGED    Details   azelastine (ASTELIN) 0.1 % nasal spray USE 2 SPRAYS IN NOSTRIL 2 TIMES DAILY AS NEEDED.  Qty: 30 mL, Refills: 11    Associated Diagnoses: Rash      cetirizine (ZYRTEC) 10 MG tablet TAKE ONE TABLET BY MOUTH ONCE DAILY AS NEEDED  Qty: 90 tablet, Refills: 3    Associated Diagnoses: Chronic rhinitis      donepezil (ARICEPT) 10 MG tablet Take 2 tablets (20 mg) by mouth At Bedtime Stop if diarrhea or nausea develop  Qty: 180 tablet, Refills: 1    Associated Diagnoses: Memory difficulties      folic acid (FOLVITE) 1 MG tablet Take 5 tablets (5 mg) by mouth daily  Qty: 150 tablet, Refills: 3    Associated Diagnoses: Major depressive disorder, recurrent episode, in full remission (H)      guanFACINE (TENEX) 1 MG tablet Take 1 tablet (1 mg) by mouth At Bedtime  Qty: 90 tablet, Refills: 0    Associated Diagnoses: Major depressive disorder, recurrent episode, moderate (H)      levothyroxine (SYNTHROID/LEVOTHROID) 150 MCG tablet Take 1 tablet (150 mcg) by mouth daily  Qty: 90 tablet, Refills: 3    Associated Diagnoses: Acquired hypothyroidism      liothyronine (CYTOMEL) 25 MCG tablet Take 1 tablet (25 mcg) by mouth daily  Qty: 30 tablet, Refills: 3    Associated Diagnoses: Major depressive disorder, recurrent episode, in full remission (H)      melatonin 3 MG tablet Take 1 tablet (3 mg) by mouth nightly as needed for sleep  Qty: 90 tablet, Refills: 1    Associated Diagnoses: Major depressive disorder, recurrent episode, in full remission (H)      memantine (NAMENDA) 10 MG tablet Take 10 mg by mouth 2 times daily      mirabegron (MYRBETRIQ) 50 MG 24 hr tablet  "Take 1 tablet (50 mg) by mouth daily  Qty: 90 tablet, Refills: 3    Associated Diagnoses: Overactive bladder      propafenone (RYTHMOL) 150 MG TABS tablet Take 1 tablet (150 mg) by mouth every 8 hours  Qty: 270 tablet, Refills: 0    Associated Diagnoses: Chronic atrial fibrillation (H)      senna-docusate (SENOKOT-S;PERICOLACE) 8.6-50 MG per tablet Take 1 tablet by mouth 2 times daily as needed for constipation  Qty: 60 tablet, Refills: 11    Associated Diagnoses: Constipation, unspecified constipation type      simvastatin (ZOCOR) 40 MG tablet Take 1 tablet (40 mg) by mouth At Bedtime. Need to update cholesterol level before additional refills.  Qty: 90 tablet, Refills: 0    Associated Diagnoses: Hyperlipidemia LDL goal <130      venlafaxine (EFFEXOR-XR) 150 MG 24 hr capsule Take 2 capsules (300 mg) by mouth daily  Qty: 180 capsule, Refills: 3    Comments: Appeal was approved.  Associated Diagnoses: Major depressive disorder, recurrent episode, moderate (H)      warfarin ANTICOAGULANT (COUMADIN) 5 MG tablet Take 1.5 tablets (7.5 mg) every Sun, Thurs; 1 tablet (5 mg) all other days or as directed by INR Clinic.  Qty: 105 tablet    Associated Diagnoses: Atrial fibrillation (H); Aortic valve prosthesis present         STOP taking these medications       cyclobenzaprine (FLEXERIL) 10 MG tablet Comments:   Reason for Stopping:         diazepam (VALIUM) 2 MG tablet Comments:   Reason for Stopping:         triamcinolone (KENALOG) 0.1 % external cream Comments:   Reason for Stopping:                      Condition on Discharge:   Discharge condition: Stable   Discharge vitals: Blood pressure (!) 143/86, pulse 63, temperature 97.2  F (36.2  C), temperature source Temporal, resp. rate 16, height 1.854 m (6' 1\"), weight 142 kg (313 lb 1.6 oz), SpO2 96 %.   Code status on discharge: Full Code      BASIC PHYSICAL EXAMINATION:  GENERAL: No apparent distress.  CARDIOVASCULAR: Regular rate and rhythm without murmurs.  PULMONARY: " Clear to auscultation bilaterally.   GASTROINTESTINAL: Abdomen soft, non-tender.  EXTREMITIES: No edema, pulses intact.  NEUROLOGIC: No focal deficits.            History of Illness:   See detailed admission note for full details.               Procedures excluding imaging which is summarized below:   Please see details in the electronic medical record.           Consultations:   ADVANCE DIRECTIVE IP CONSULT  PHARMACY TO DOSE WARFARIN  CARE MANAGEMENT / SOCIAL WORK IP CONSULT  NEUROLOGY IP CONSULT  ORTHOPEDIC SURGERY IP CONSULT  NEUROLOGY IP CONSULT  PHYSICAL THERAPY ADULT IP CONSULT  OCCUPATIONAL THERAPY ADULT IP CONSULT  NUTRITION SERVICES ADULT IP CONSULT  VASCULAR ACCESS ADULT IP CONSULT  WOUND OSTOMY CONTINENCE NURSE  IP CONSULT  VASCULAR ACCESS ADULT IP CONSULT  PHARMACY IP CONSULT  PHARMACY IP CONSULT  ORTHOSIS EXTREMITY LOWER REFERRAL IP CONSULT  PHARMACY IP CONSULT  PHARMACY IP CONSULT  PHARMACY TO DOSE WARFARIN  PHYSICAL THERAPY ADULT IP CONSULT  OCCUPATIONAL THERAPY ADULT IP CONSULT          Significant Results:   Results for orders placed or performed during the hospital encounter of 01/22/21   Head CT w/o contrast    Narrative    CT SCAN OF THE HEAD WITHOUT CONTRAST   1/22/2021 1:06 PM     HISTORY: Head trauma, minor, normal mental status (Age 19-64y).    TECHNIQUE:  Axial images of the head and coronal reformations without  IV contrast material. Radiation dose for this scan was reduced using  automated exposure control, adjustment of the mA and/or kV according  to patient size, or iterative reconstruction technique.    COMPARISON: Head CT 5/1/2013    FINDINGS:  New wedge-shaped area of hypoattenuation is present  involving the right lateral occipital lobe with loss of gray-white  differentiation.    Old bilateral cerebellar infarcts. Old infarct at the junction of the  left parietal and occipital lobe. Mild volume loss is present. White  matter hypoattenuation likely represents mild chronic small  vessel  ischemic change. No evidence of hemorrhage or significant mass effect.    The visualized calvarium, tympanic cavities, and mastoid cavities are  unremarkable.      Impression    IMPRESSION:   1. New area of hypoattenuation involving the right occipital lobe,  suspicious for infarct, potentially subacute.  2. No evidence of hemorrhage.  3. Multiple old infarcts.    BRADLEY ROMO MD   XR Chest Port 1 View    Narrative    CHEST PORTABLE ONE VIEW   1/22/2021 12:01 PM     HISTORY: Weakness.    COMPARISON: Chest x-ray 3/6/2017.      Impression    IMPRESSION: Subtle new patchy opacities at the left lung base compared  to the prior exam. Cannot exclude developing subtle airspace disease  including potentially pneumonia. Right lung appears clear. Stable  elevated right hemidiaphragm. Stable cardiac silhouette and  sternotomy.    RAYO CHIN MD   XR Tibia & Fibula Port Left 2 Views    Narrative    XR TIBIA & FIBULA PORT LT 2 VW 1/22/2021 12:00 PM     HISTORY: WOUND      Impression    IMPRESSION: No evidence of tibial or fibular osseous destruction or  fracture. Extensive atherosclerotic calcification is noted throughout  the leg.    MAITE BERNABE MD   MRA Neck (Carotids) wo & w Contrast    Narrative    EXAM: MRA NECK (CAROTIDS) WO AND W CONTRAST, MRA BRAIN ([place]) WO CONTRAST  LOCATION: [facility]  DATE/TIME: 1/22/2021 7:38 PM    INDICATION: Stroke, follow up.  COMPARISON: Brain MR from today. Head CT from today. Head CT 7/18/2018.  CONTRAST: 10 mL Gadavist.  TECHNIQUE:   1) 3D time-of-flight head MRA without intravenous contrast.  2) Neck MRA without and with IV contrast.    FINDINGS:  HEAD MRA:   ANTERIOR CIRCULATION: No stenosis/occlusion, aneurysm, or high flow vascular malformation. Standard [place] anatomy.    POSTERIOR CIRCULATION: There is tapering of the distal aspect of the left posterior cerebral artery in the P2-P3 junction region which may relate to diffuse stenosis.  Note made that there was no acute infarct on comparison brain MR in this vascular   territory. Balanced vertebral arteries supply a normal basilar artery.     NECK MRA:   RIGHT CAROTID: No measurable stenosis or dissection.    LEFT CAROTID: No measurable stenosis or dissection.    VERTEBRAL ARTERIES: No focal stenosis or dissection. The left vertebral artery is slightly dominant. There is mild diffuse stenosis in the distal most aspects of the right vertebral artery.    AORTIC ARCH: Common origin of the brachiocephalic and left common carotid artery, normal variant. No great vessel origin stenosis.      Impression    IMPRESSION:    HEAD MRA:   1.  Tapering of the distal aspect of the left posterior cerebral artery may relate to diffuse stenosis. This is unlikely to reflect acute vessel occlusion in this vessel as there was no associated acute infarct in the left PCA vascular territory.    NECK MRA:  1.  Mild diffuse stenosis distal most aspects right vertebral artery. No hemodynamically significant extracranial stenosis.   MRA Brain ([place]) wo Contrast    Narrative    EXAM: MRA NECK (CAROTIDS) WO AND W CONTRAST, MRA BRAIN ([place]) WO CONTRAST  LOCATION: [facility]  DATE/TIME: 1/22/2021 7:38 PM    INDICATION: Stroke, follow up.  COMPARISON: Brain MR from today. Head CT from today. Head CT 7/18/2018.  CONTRAST: 10 mL Gadavist.  TECHNIQUE:   1) 3D time-of-flight head MRA without intravenous contrast.  2) Neck MRA without and with IV contrast.    FINDINGS:  HEAD MRA:   ANTERIOR CIRCULATION: No stenosis/occlusion, aneurysm, or high flow vascular malformation. Standard [place] anatomy.    POSTERIOR CIRCULATION: There is tapering of the distal aspect of the left posterior cerebral artery in the P2-P3 junction region which may relate to diffuse stenosis. Note made that there was no acute infarct on comparison brain MR in this vascular   territory. Balanced vertebral arteries  supply a normal basilar artery.     NECK MRA:   RIGHT CAROTID: No measurable stenosis or dissection.    LEFT CAROTID: No measurable stenosis or dissection.    VERTEBRAL ARTERIES: No focal stenosis or dissection. The left vertebral artery is slightly dominant. There is mild diffuse stenosis in the distal most aspects of the right vertebral artery.    AORTIC ARCH: Common origin of the brachiocephalic and left common carotid artery, normal variant. No great vessel origin stenosis.      Impression    IMPRESSION:    HEAD MRA:   1.  Tapering of the distal aspect of the left posterior cerebral artery may relate to diffuse stenosis. This is unlikely to reflect acute vessel occlusion in this vessel as there was no associated acute infarct in the left PCA vascular territory.    NECK MRA:  1.  Mild diffuse stenosis distal most aspects right vertebral artery. No hemodynamically significant extracranial stenosis.   MR Brain w/o & w Contrast    Narrative    EXAM: MR BRAIN W/O and W CONTRAST  LOCATION: [facility]  DATE/TIME: 1/22/2021 7:38 PM    INDICATION: Stroke, follow up.  COMPARISON: Brain MR and MRA [place] from today. Head CT from today. Head CT 7/18/2018.  CONTRAST: 10 mL Gadavist  TECHNIQUE: Routine multiplanar multisequence head MRI without and with intravenous contrast.    FINDINGS:  INTRACRANIAL CONTENTS: There is a 3 mm rounded focus of restricted diffusion at the right frontal vertex, involving the region of the right middle frontal gyrus, see image 48 of series 2.2 with associated abnormal FLAIR signal consistent with a tiny   punctate infarct in this location. No other definite evidence of other acute infarct. Chronic appearing right occipital infarct with associated T1 hyperintense signal consistent with some laminar necrosis. There is a focal region of encephalomalacia   involving the left parieto-occipital junction consistent with old infarct in this location. Bilateral cerebellar  hemispheric infarcts also noted. No mass, acute hemorrhage, or extra-axial fluid collection. There are a couple punctate foci of   susceptibility, one within the white matter of the right frontal lobe on image 21 of series 4 and another within the left posterior temporal lobe on image 15 of series 4 which may relate to old foci of hemosiderin deposition. Patchy nonspecific T2/FLAIR   hyperintensities within the cerebral white matter most consistent with mild to moderate chronic microvascular ischemic change. Mild generalized cerebral atrophy. No hydrocephalus. Normal position of the cerebellar tonsils. Left cerebellar hemispheric   DVA. No other pathologic contrast enhancement.    SELLA: No abnormality accounting for technique.    OSSEOUS STRUCTURES/SOFT TISSUES: Normal marrow signal. The major intracranial vascular flow voids are maintained.     ORBITS: No acute orbital abnormality.     SINUSES/MASTOIDS: No paranasal sinus mucosal disease. No middle ear or mastoid effusion.       Impression    IMPRESSION:  1.  3 mm focus of restricted diffusion at the right frontal vertex, involving the right middle frontal gyrus region without associated mass effect or hemorrhage consistent with acute/subacute infarct. No mass effect.    2.  Numerous other chronic appearing infarcts, with dominant infarct present in the right occipital lobe associated with laminar necrosis. Additional bilateral cerebellar hemispheric infarct also present.       Echocardiogram Complete    Narrative    [id number]  [id number]  [id number]  [id number]^MICHAEL^DONNA^[facility]  Echocardiography Laboratory  [address] [place] [address]        Name: ASCHOFF, TODD S  MRN: [id number]  : 1955  Study Date: 2021 01:40 PM  Age: 65 yrs  Gender: Male  Patient Location: [facility]  Reason For Study: CVA  Ordering Physician: DONNA RODRIGUEZ  Performed By: Naina Liu     BSA: 2.6 m2  Height: 73 in  Weight: 309 lb  HR:  70  BP: 121/71 mmHg  _____________________________________________________________________________  __        Procedure  Complete Portable Bubble Echo Adult. [employer] (NDC #[id number]) given  intravenously. Technically difficult study.Extremely difficult acoustic  windows despite the use of contrast for endcardial border definition.  _____________________________________________________________________________  __        Interpretation Summary     Left ventricular systolic function is normal.  The visual ejection fraction is estimated at 55-60%.  S/P mechanical aortic valve replacement with conduit graft graft  The anterior portion of aortic root appears thickened, unchanged when directly  compared to prior study from 2018.  The prosthetic aortic valve and leaflets are not well visualized.  The gradient is normal for this prosthetic aortic valve. (Mean gradient 11mm)  The study was technically difficult.  _____________________________________________________________________________  __        Left Ventricle  The left ventricle is normal in size. There is concentric remodeling present.  Left ventricular systolic function is normal. The visual ejection fraction is  estimated at 55-60%. Grade II or moderate diastolic dysfunction. No regional  wall motion abnormalities noted.     Right Ventricle  The right ventricle is normal size. The right ventricle is not well  visualized.     Atria  The left atrium is mildly dilated. LA volume appears to be underestimated.  Right atrium not well visualized. There is no color Doppler evidence of an  atrial shunt. A contrast injection (Bubble Study) was performed that was  negative for flow across the interatrial septum.     Mitral Valve  There is mild mitral annular calcification. There is trace mitral  regurgitation.        Tricuspid Valve  There is mild (1+) tricuspid regurgitation. The right ventricular systolic  pressure is approximated at 29.2 mmHg plus the right atrial pressure.  IVC  diameter >2.1 cm collapsing <50% with sniff suggests a high RA pressure  estimated at 15 mmHg or greater.     Aortic Valve  There is trace aortic regurgitation. The mean AoV pressure gradient is 11.0  mmHg. There is a mechanical aortic valve. The prosthetic aortic valve is not  well visualized. The gradient is normal for this prosthetic aortic valve.     Pulmonic Valve  There is mild (1+) pulmonic valvular regurgitation. Right ventricular  diastolic pressure is approximated at 5mmHg plus the right atrial pressure.     Vessels  The aortic root is normal size.     Pericardium  There is no pericardial effusion.        Rhythm  Sinus rhythm was noted.  _____________________________________________________________________________  __  MMode/2D Measurements & Calculations     IVSd: 1.5 cm  LVIDd: 4.2 cm  LVIDs: 2.7 cm  LVPWd: 1.3 cm  FS: 36.5 %  LV mass(C)d: 224.1 grams  LV mass(C)dI: 86.6 grams/m2  LA dimension: 5.2 cm  asc Aorta Diam: 3.0 cm  LVOT diam: 2.3 cm  LVOT area: 4.1 cm2  LA Volume (BP): 56.0 ml  LA Volume Index (BP): 21.6 ml/m2  RWT: 0.59        Time Measurements  Aortic HR: 71.0 BPM     Doppler Measurements & Calculations  MV E max enrike: 125.9 cm/sec  MV A max enrike: 69.6 cm/sec  MV E/A: 1.8  MV dec time: 0.17 sec  Ao V2 max: 226.7 cm/sec  Ao max P.0 mmHg  Ao V2 mean: 157.1 cm/sec  Ao mean P.0 mmHg  Ao V2 VTI: 41.5 cm  SHAWN(I,D): 1.7 cm2  SHAWN(V,D): 1.4 cm2  LV V1 max P.4 mmHg  LV V1 max: 78.0 cm/sec  LV V1 VTI: 17.1 cm  CO(LVOT): 5.0 l/min  CI(LVOT): 1.9 l/min/m2  SV(LVOT): 70.9 ml  SI(LVOT): 27.4 ml/m2  PA acc time: 0.15 sec  PI end-d enrike: 111.3 cm/sec  TR max enrike: 270.1 cm/sec  TR max P.2 mmHg  AV Enrike Ratio (DI): 0.34  SHAWN Index (cm2/m2): 0.66  E/E' av.6     Lateral E/e': 14.8  Medial E/e': 24.4           _____________________________________________________________________________  __           Report approved by: Nu Bañuelos 2021 04:04 PM        *Note: Due to a large  number of results and/or encounters for the requested time period, some results have not been displayed. A complete set of results can be found in Results Review.       Transthoracic Echocardiogram Results:  No results found for this or any previous visit (from the past 4320 hour(s)).             Pending Results:   Unresulted Labs Ordered in the Past 30 Days of this Admission     No orders found from 12/23/2020 to 1/23/2021.                      Discharge Instructions and Follow-Up:   Discharge instructions and follow-up:   Discharge Procedure Orders   General info for SNF   Order Comments: Length of Stay Estimate: Short Term Care: Estimated # of Days <30  Condition at Discharge: Stable  Level of care:skilled   Rehabilitation Potential: Fair  Admission H&P remains valid and up-to-date: Yes  Recent Chemotherapy: N/A  Use Nursing Home Standing Orders: Yes     Mantoux instructions   Order Comments: Give two-step Mantoux (PPD) Per Facility Policy Yes     Follow Up and recommended labs and tests   Order Comments: Follow up with Nursing home physician.    Patient may be weight bearing as tolerated with the boot for 5 weeks  Please apply xeroform to skin graft site twice daily and OK to cover the closed incision proximal to the skin graft as well. Keep tegaderm over the proximal thigh skin graft donor site. Do not remove. OK To reinforce with abdominal pads. Change these abdominal pads twice daily which should be placed over the exisiting tegaderm.     Wrap leg from distal to proximal with kerlix followed by gentle ace wrap include foot and thigh.  Staples may be removed from incision and the skin graft site 3 weeks postop.   Patient should follow-up with Dr. Plascencia in clinic between Feb 23 and March 2. Schedule appointment by calling [phone].  Follow-up with general neurology in 1 week (at that time lamotrigine can be increased to 50 mg daily and EEG can be done).  Follow-up with stroke neurology in 6-8  weeks.  Continue lovenox bridging until INR 2.5-3.5.     Activity - Up with nursing assistance     Order Specific Question Answer Comments   Is discharge order? Yes      Full Code     Order Specific Question Answer Comments   Code status determined by: Discussion with patient/ legal decision maker      Physical Therapy Adult Consult   Order Comments: Evaluate and treat as clinically indicated.    Reason:  deconditioning     Occupational Therapy Adult Consult   Order Comments: Evaluate and treat as clinically indicated.    Reason:  deconditioning     Fall precautions     Advance Diet as Tolerated   Order Comments: Follow this diet upon discharge: Orders Placed This Encounter      Snacks/Supplements Adult: Boost Shake; Between Meals      Regular Diet Adult     Order Specific Question Answer Comments   Is discharge order? Yes              Hospital Course:   Todd S Aschoff is a 65 year old male with past medical history of aortic dissection status post mechanical aortic valve plus graft (1992), atrial fibrillation (chronic anticoagulation with Coumadin), chronic pain, dementia and depression who was hospitalized 1/9-1/13 here for left leg wound with drainage (initially caused by a fall approximately 1 month prior) which was found to be infected hematoma requiring I&D (1/10) and ultimately discharged home on oral antibiotics.    He was re-admitted on 1/22/2021 with generalized weakness, confusion and hypotension.  Patient was found to have acute CVA but no longer has any focal deficits.  Also likely had a seizure prior to admission.       Patient underwent surgery with plastics on 1/26 with removal of 1400 mL of hematoma (no active bleeding) and placement of wound vac.  Wound improving so wound vac changed at the bedside on 1/28.  Has been undergoing [facility] wound vac changes.  Dr. Plascencia took the patient to the operating room on Wednesday, 2/3 for skin graft.  Plan to keep the patient here for 5 days after his skin graft  for wound cares etc.       Problem List/Assessment and Plan:   Acute right frontal infarct, suspect this is a silent infarct as he has no neurological deficits: Patient did undergo CT of the head which showed age indeterminate right occipital infarct.  MRI of the brain showed an acute infarct in the right frontal vertex and multiple chronic infarcts in the right occipital lobe and bilateral cerebellar hemispheres.  MRA shows possible diffuse stenosis of the left PCA.  TTE showed no change from prior.  He has no neurological deficits.  -Stroke neurology consulted, appreciate recommendations.  -Continue simvastatin 40 mg daily.  -Continue Coumadin as PTA, bridging with heparin perioperatively.  -Follow-up with PCP in 1 to 2 weeks, follow-up with neurology in clinic in 8 weeks.     Suspected seizure: Patient's wife describes seizure-like activity prior to the events that led to his hospitalization.  He had a right arm shaking and unresponsiveness.  He does have an abnormal MRI as above which certainly could serve as a seizure focus.  Neurology recommended starting lamotrigine. No further seizure activity.  -Seizure precautions.  -General neurology consulted, appreciate recommendations.  -Started lamotrigine 25 mg daily on 1/24.  -Need to follow-up with general neurology in 1-2 weeks (at that time lamotrigine can be increased to 50 mg daily and EEG can be done) -given the plan to keep the patient here for 5 days after surgery may need to touch base with Neurology prior to discharge to see if the Lamotrigine needs to be increased before his discharge.  -No driving, climbing, tub baths or potential dangerous activity in the next 3 months.     Left lower extremity infected hematoma status post I&D on 1/10, nonhealing with associated cellulitis s/p Large hematoma evaluation and placement of wound vac on 1/26.  S/p fall in December 2020.  During his last admission was found to have infected hematoma.  Underwent I&D with  Ortho on 1/10.  His wound culture grew E. coli and Enterococcus faecalis.  ID did follow throughout that admission and felt that the pathogen was E. coli.  He was treated with IV ceftriaxone and at time of discharge cefuroxime for an additional 7 days.  He has had dehiscence of the lower wound with surrounding necrosis.  He underwent I&D with evacuation of 1400 mL hematoma (no active bleeding) on 1/26 with placement of wound vac.  Wound improving after this procedure per Dr. Plascencia.  Return to the OR on 2/3 for split thickness skin graft and closure with ongoing wound VAC.  -Orthopedics consulted, appreciate recommendations.   -Plastic surgery consulted, appreciate recommendations.  -Wound vac management for 5 days.  -Completed ceftriaxone 7 days on 1/29/21.  -Wound nurse consulted, appreciate recommendations.     Acute toxic/metabolic encephalopathy - resolved: Presented with diffuse weakness and significant confusion.  He had significant hypotension.  With hydration his mental status did improve.  I suspect his confusion was multifactorial due to likely seizure, hypotension, acute kidney injury.       Acute kidney injury.  Improved.  Baseline creatinine approximately 1.  On admission creatinine was elevated at 1.58, peaked 1.66.  He is urinating.  He was quite hypotensive on admission which could have lead to ATN causing his renal insufficiency.   -No NSAIDs  -Periodic BMP.     Left Shoulder Pain: Patient had pain in the left shoulder (fall/seizure PTA as above).  Orthopedics consulted.  He has no fracture but does have left RC tendinitis with AC joint arthrosis and probable loose bodies.  He underwent left shoulder subacromial injection on 2/25 and feels that this has helped his pain.  -Follow-up with [facility] in outpatient setting for further work-up if persistent pain/decreased mobility     Hypotension - resolved: Patient had rather profound hypotension on presentation.  This improved significantly with fluids.   Blood pressure now is stable.     Chronic anticoagulation with warfarin for mechanical aortic valve and atrial fibrillation: INR was supratherapeutic at 6.9 (1/23).  INR reversed for surgery with Vitamin K.  He was subsequently started on heparin drip in the perioperative setting.  His goal INR level is 2.5-3.5.    -Heparin drip perioperatively but hemoglobin stable.  -Restarted coumadin and bridge post-op.  -Transitioned to Lovenox.     Acute Anemia: Historically Hgb had been normal, decreased to 12 range during his last hospitalization.  This admission was 12.8 on admission, has decreased to 11.3 but stable over the past few days.  Did have large hematoma evacuated of the LLE on 1/26 as above, no active bleeding.  Iron checked and normal. No indication for transfusion at this time.  -Daily Hgb.     Atrial fibrillation: Resumed the patient on his propafenone.  Anticoagulation plan as above.     Chronic pain: Patient is on Lyrica.  As needed oxycodone also available in addition to Tylenol.     Hypothyroidism: Patient is on levothyroxine and liothyronine.     Depression: Continue Effexor.     Dementia: Continue prior to admission Namenda and Aricept.     Weakness: PT/OT consulted.  Have recommended TCU which patient and his wife are agreeable.    The patient was seen, examined, and counseled on this day. The hospitalization and plan of care was reviewed with the patient extensively. All questions were addressed and the patient agreed to follow-up as noted above.      Total time spent in face to face contact with the patient and coordinating discharge was:  60 Minutes    Aurelio Etienne DO, MPH  Formerly [facility] Hospitalist  [address] [place] [address]  Pager: [phone]  02/07/2021

## 2021-02-07 NOTE — PLAN OF CARE
Vss. Afebrile. Lungs diminished-room air low 90s. +bs/+gas, no nausea. Tolerating diet. Last bm 02/06/21. Voiding. No iv access. Cms-cool toes, +1 pulses, & +1 edema. Skin has bruises & scabs. Pain managed with Oxycodone. Sterile drsg change done to Left calf incisions-intact staples with scant serosang drainage-only replaced Abd drsg reinforcement to skin graft site. Whole leg then wrapped with ace. Brace on always. Tolerating repositioning & elevation on wedge. Remains on contact isolation precautions. No other significant issues noted overnight.

## 2021-02-07 NOTE — PROGRESS NOTES
Care Management Follow Up    Length of Stay (days): 16    Expected Discharge Date: 02/07/21     Concerns to be Addressed: discharge planning     Patient plan of care discussed at interdisciplinary rounds: Yes    Anticipated Discharge Disposition: Transitional Care     Anticipated Discharge Services:  TCU  Anticipated Discharge DME: Wound Vac    Patient/family educated on Medicare website which has current facility and service quality ratings: yes  Education Provided on the Discharge Plan:  yes  Patient/Family in Agreement with the Plan: yes    Referrals Placed by CM/SW: Post Acute Facilities  Private pay costs discussed: private room/amenity fees and transportation costs    Additional Information:  Patient is off the wound vac and ready for discharge. [facility] said they could take patient tomorrow afternoon. SW called [facility] and arranged transport for 1:30 tomorrow. Patient and bedside nurse updated.       MALLY Rousseau

## 2021-02-07 NOTE — PROGRESS NOTES
SW called and left a message for admissions to discuss when patient can admit. Patient has been accepted. Patient's wound vac is off and is ready to discharge to TCU.       MALLY Lepe, [facility]  [phone]  [address]   [facility]. [id number]  [facility]

## 2021-02-07 NOTE — PLAN OF CARE
"/82 (BP Location: Left arm)   Pulse 69   Temp 98.3  F (36.8  C) (Temporal)   Resp 16   Ht 1.854 m (6' 1\")   Wt 142 kg (313 lb 1.6 oz)   SpO2 92%   BMI 41.31 kg/m    Orientation: A&Ox4, forgetful.  Resp: LS diminished, RA  Skin: MD from plastics came by and removed the wound Vac because it was not working properly, and we changed the dressing, he emphasized the importance of applying xeroform to skin graft site twice daily to prevent it from becoming dry, and to leave the tegaderm in place over the thigh donor site to promote scabbing and to not remove this to only reinforce with ABD and change pads twice daily, then cover lower site with kerlix and both lower and upper thigh with Ace wrap.  Pain: Managed with Tylenol and Oxycodone  Dressing: CDI  Activity: A1, walker/gb  Diet: regular  Voiding: spontaneously without difficulty  Plan: will need to be changed since the wound vac has been removed.     "

## 2021-02-08 ENCOUNTER — RECORDS - HEALTHEAST (OUTPATIENT)
Dept: LAB | Facility: CLINIC | Age: 66
End: 2021-02-08

## 2021-02-08 ENCOUNTER — APPOINTMENT (OUTPATIENT)
Dept: PHYSICAL THERAPY | Facility: CLINIC | Age: 66
End: 2021-02-08
Payer: COMMERCIAL

## 2021-02-08 VITALS
TEMPERATURE: 97.4 F | WEIGHT: 313.1 LBS | SYSTOLIC BLOOD PRESSURE: 140 MMHG | BODY MASS INDEX: 41.5 KG/M2 | HEIGHT: 73 IN | DIASTOLIC BLOOD PRESSURE: 85 MMHG | OXYGEN SATURATION: 96 % | HEART RATE: 65 BPM | RESPIRATION RATE: 16 BRPM

## 2021-02-08 LAB — INR PPP: 1.58 (ref 0.86–1.14)

## 2021-02-08 PROCEDURE — 97530 THERAPEUTIC ACTIVITIES: CPT | Mod: GP | Performed by: PHYSICAL THERAPIST

## 2021-02-08 PROCEDURE — 99207 PR CDG-CODE CATEGORY CHANGED: CPT | Performed by: INTERNAL MEDICINE

## 2021-02-08 PROCEDURE — 250N000013 HC RX MED GY IP 250 OP 250 PS 637: Performed by: STUDENT IN AN ORGANIZED HEALTH CARE EDUCATION/TRAINING PROGRAM

## 2021-02-08 PROCEDURE — 99232 SBSQ HOSP IP/OBS MODERATE 35: CPT | Performed by: INTERNAL MEDICINE

## 2021-02-08 PROCEDURE — 36415 COLL VENOUS BLD VENIPUNCTURE: CPT | Performed by: HOSPITALIST

## 2021-02-08 PROCEDURE — 250N000013 HC RX MED GY IP 250 OP 250 PS 637: Performed by: INTERNAL MEDICINE

## 2021-02-08 PROCEDURE — 97116 GAIT TRAINING THERAPY: CPT | Mod: GP | Performed by: PHYSICAL THERAPIST

## 2021-02-08 PROCEDURE — 250N000011 HC RX IP 250 OP 636: Performed by: HOSPITALIST

## 2021-02-08 PROCEDURE — 85610 PROTHROMBIN TIME: CPT | Performed by: HOSPITALIST

## 2021-02-08 RX ORDER — WARFARIN SODIUM 5 MG/1
5 TABLET ORAL
Status: DISCONTINUED | OUTPATIENT
Start: 2021-02-08 | End: 2021-02-08 | Stop reason: HOSPADM

## 2021-02-08 RX ADMIN — LIOTHYRONINE SODIUM 25 MCG: 25 TABLET ORAL at 08:53

## 2021-02-08 RX ADMIN — PREGABALIN 100 MG: 100 CAPSULE ORAL at 08:54

## 2021-02-08 RX ADMIN — VENLAFAXINE HYDROCHLORIDE 300 MG: 150 CAPSULE, EXTENDED RELEASE ORAL at 08:53

## 2021-02-08 RX ADMIN — OXYCODONE HYDROCHLORIDE 5 MG: 5 TABLET ORAL at 06:00

## 2021-02-08 RX ADMIN — MEMANTINE 10 MG: 5 TABLET ORAL at 08:54

## 2021-02-08 RX ADMIN — LAMOTRIGINE 25 MG: 25 TABLET ORAL at 08:54

## 2021-02-08 RX ADMIN — DOCUSATE SODIUM 100 MG: 100 CAPSULE, LIQUID FILLED ORAL at 08:54

## 2021-02-08 RX ADMIN — MULTIPLE VITAMINS W/ MINERALS TAB 1 TABLET: TAB at 08:53

## 2021-02-08 RX ADMIN — LEVOTHYROXINE SODIUM 150 MCG: 150 TABLET ORAL at 08:53

## 2021-02-08 RX ADMIN — FOLIC ACID 5 MG: 1 TABLET ORAL at 08:54

## 2021-02-08 RX ADMIN — PROPAFENONE HYDROCHLORIDE 150 MG: 150 TABLET, FILM COATED ORAL at 06:00

## 2021-02-08 RX ADMIN — ENOXAPARIN SODIUM 150 MG: 150 INJECTION SUBCUTANEOUS at 08:54

## 2021-02-08 RX ADMIN — ACETAMINOPHEN 1000 MG: 500 TABLET, FILM COATED ORAL at 08:54

## 2021-02-08 RX ADMIN — SILVER SULFADIAZINE: 10 CREAM TOPICAL at 10:18

## 2021-02-08 ASSESSMENT — ACTIVITIES OF DAILY LIVING (ADL)
ADLS_ACUITY_SCORE: 23
ADLS_ACUITY_SCORE: 19
ADLS_ACUITY_SCORE: 23
ADLS_ACUITY_SCORE: 22

## 2021-02-08 NOTE — PLAN OF CARE
Occupational Therapy Discharge Summary    Reason for therapy discharge:    Discharged to transitional care facility.    Progress towards therapy goal(s). See goals on Care Plan in [facility] electronic health record for goal details.  Goals partially met.  Barriers to achieving goals:   discharge from facility.    Therapy recommendation(s):    Continued therapy is recommended.  Rationale/Recommendations:  ongoing skilled OT at TCU to improve strength and ind with ADLs.

## 2021-02-08 NOTE — DISCHARGE SUMMARY
[facility] [place]  Hospitalist Discharge Summary      Date of Admission:  1/22/2021  Date of Discharge:  2/8/2021  Discharging Provider: Shadi Pereira DO      Discharge Diagnoses   1.  Acute stroke.  2.  Suspected seizure.  3.  Left lower extremity infected hematoma, cellulitis, and skin graft.  4.  Acute toxic/metabolic encephalopathy.  5.  Acute kidney injury.  6.  Atrial fibrillation.  7.  Acute blood loss anemia.  8.  Previous mechanical aortic valve.  9.  Hypothyroidism.  10.  Depression.  11.  Chronic pain syndrome.      Follow-ups Needed After Discharge   Follow-up Appointments     Follow Up and recommended labs and tests      Follow up with Nursing home physician.    Patient may be weight bearing as tolerated with the boot for 5 weeks  Please apply xeroform to skin graft site twice daily and OK to cover the   closed incision proximal to the skin graft as well. Keep tegaderm over the   proximal thigh skin graft donor site. Do not remove. OK To reinforce with   abdominal pads. Change these abdominal pads twice daily which should be   placed over the exisiting tegaderm.     Wrap leg from distal to proximal with kerlix followed by gentle ace wrap   include foot and thigh.  Staples may be removed from incision and the skin graft site 3 weeks   postop.   Patient should follow-up with Dr. Plascencia in clinic between Feb 23 and   March 2. Schedule appointment by calling [phone].  Follow-up with general neurology in 1 week (at that time lamotrigine can   be increased to 50 mg daily and EEG can be done).  Follow-up with stroke neurology in 6-8 weeks.  Continue lovenox bridging until INR 2.5-3.5.             Discharge Disposition   Discharged to rehabilitation facility  Condition at discharge: Stable      Hospital Course   Todd S Aschoff is a 65 year old male with past medical history of aortic dissection status post mechanical aortic valve plus graft (1992), atrial fibrillation (chronic  anticoagulation with Coumadin), chronic pain, dementia and depression who was hospitalized 1/9-1/13 here for left leg wound with drainage (initially caused by a fall approximately 1 month prior) which was found to be infected hematoma requiring I&D (1/10) and ultimately discharged home on oral antibiotics.     He was re-admitted on 1/22/2021 with generalized weakness, confusion and hypotension.  Patient was found to have acute CVA but no longer has any focal deficits.  Also likely had a seizure prior to admission.  Was initially noted to have supratherapeutic INR.  Did require vitamin K at time of presentation.  INR then did become subtherapeutic.  He does have a history of mechanical aortic valve replacement.      Patient underwent surgery with plastics on 1/26 with removal of 1400 mL of hematoma (no active bleeding) and placement of wound vac.  Wound improving so wound vac changed at the bedside on 1/28.  Has been undergoing [facility] wound vac changes.  Dr. Plascencia took the patient to the operating room on Wednesday, 2/3 for skin graft.  He is going be discharged to a transitional care unit for aggressive rehabilitation and management of wound VAC.  He will need to continue following with plastic surgery in the outpatient setting.    He is currently on subcutaneous enoxaparin at treatment dose while INR is subtherapeutic.  He will need to continue on 6 days enoxaparin as bridging therapy until INR is in goal range of 2.5-3.5.        Consultations This Hospital Stay   ADVANCE DIRECTIVE IP CONSULT  PHARMACY TO DOSE WARFARIN  CARE MANAGEMENT / SOCIAL WORK IP CONSULT  NEUROLOGY IP CONSULT  ORTHOPEDIC SURGERY IP CONSULT  NEUROLOGY IP CONSULT  PHYSICAL THERAPY ADULT IP CONSULT  OCCUPATIONAL THERAPY ADULT IP CONSULT  NUTRITION SERVICES ADULT IP CONSULT  VASCULAR ACCESS ADULT IP CONSULT  WOUND OSTOMY CONTINENCE NURSE  IP CONSULT  VASCULAR ACCESS ADULT IP CONSULT  PHARMACY IP CONSULT  PHARMACY IP CONSULT  ORTHOSIS EXTREMITY  LOWER REFERRAL IP CONSULT  PHARMACY IP CONSULT  PHARMACY IP CONSULT  PHARMACY TO DOSE WARFARIN  PHYSICAL THERAPY ADULT IP CONSULT  OCCUPATIONAL THERAPY ADULT IP CONSULT    Code Status   Full Code    Time Spent on this Encounter   I spent 25 minutes with Mr. Aschoff and working on discharge on 2/8/2021.       Shadi Pereira DO  [facility]  [address] [facility] [address]  Phone: [phone]  Fax: [phone]  ______________________________________________________________________    Physical Exam   Vital Signs: Temp: 97.6  F (36.4  C) Temp src: Temporal BP: 135/89 Pulse: 62   Resp: 16 SpO2: 95 % O2 Device: None (Room air)    Weight: 313 lbs 1.6 oz  Gen:  NAD, A&Ox3.  Eyes:  PERRL, sclera anicteric.  OP:  MMM, no lesions.  Neck:  Supple.  CV:  Regular, no murmurs.  Positive mechanical click.  Lung:  CTA b/l, normal effort.  Ab:  +BS, soft.  Skin:  Warm, dry to touch.  Left leg dressing not removed.  Ext:  No pitting edema LE b/l.         Primary Care Physician   Obdulio Ingram MD    Discharge Orders      General info for SNF    Length of Stay Estimate: Short Term Care: Estimated # of Days <30  Condition at Discharge: Stable  Level of care:skilled   Rehabilitation Potential: Fair  Admission H&P remains valid and up-to-date: Yes  Recent Chemotherapy: N/A  Use Nursing Home Standing Orders: Yes     Mantoux instructions    Give two-step Mantoux (PPD) Per Facility Policy Yes     Follow Up and recommended labs and tests    Follow up with Nursing home physician.    Patient may be weight bearing as tolerated with the boot for 5 weeks  Please apply xeroform to skin graft site twice daily and OK to cover the closed incision proximal to the skin graft as well. Keep tegaderm over the proximal thigh skin graft donor site. Do not remove. OK To reinforce with abdominal pads. Change these abdominal pads twice daily which should be placed over the exisiting tegaderm.     Wrap leg from  distal to proximal with kerlix followed by gentle ace wrap include foot and thigh.  Staples may be removed from incision and the skin graft site 3 weeks postop.   Patient should follow-up with Dr. Plascencia in clinic between Feb 23 and March 2. Schedule appointment by calling [phone].  Follow-up with general neurology in 1 week (at that time lamotrigine can be increased to 50 mg daily and EEG can be done).  Follow-up with stroke neurology in 6-8 weeks.  Continue lovenox bridging until INR 2.5-3.5.     Activity - Up with nursing assistance     Full Code     Physical Therapy Adult Consult    Evaluate and treat as clinically indicated.    Reason:  deconditioning     Occupational Therapy Adult Consult    Evaluate and treat as clinically indicated.    Reason:  deconditioning     Fall precautions     Advance Diet as Tolerated    Follow this diet upon discharge: Orders Placed This Encounter      Snacks/Supplements Adult: Boost Shake; Between Meals      Regular Diet Adult           Discharge Medications   Current Discharge Medication List      START taking these medications    Details   lamoTRIgine (LAMICTAL) 25 MG tablet Take 1 tablet (25 mg) by mouth daily  Qty:      Associated Diagnoses: Generalized muscle weakness      silver sulfADIAZINE (SILVADENE) 1 % external cream Apply topically 2 times daily  Qty:      Associated Diagnoses: Generalized muscle weakness         CONTINUE these medications which have CHANGED    Details   acetaminophen (TYLENOL) 500 MG tablet Take 1-2 tablets (500-1,000 mg) by mouth every 8 hours as needed for mild pain    Associated Diagnoses: Wound of left lower extremity, initial encounter      enoxaparin ANTICOAGULANT (LOVENOX) 150 MG/ML syringe Inject 0.9 mLs (135 mg) Subcutaneous every 12 hours  Qty:      Associated Diagnoses: Aortic valve prosthesis present      oxyCODONE (ROXICODONE) 5 MG tablet Take 1 tablet (5 mg) by mouth every 4 hours as needed for breakthrough pain  Qty: 15 tablet,  Refills: 0    Associated Diagnoses: Wound of left lower extremity, initial encounter      pregabalin (LYRICA) 100 MG capsule Take 1 capsule (100 mg) by mouth 3 times daily Do not take if sleepy/sedated.  Qty: 30 capsule, Refills: 0    Associated Diagnoses: Chronic bilateral low back pain without sciatica         CONTINUE these medications which have NOT CHANGED    Details   azelastine (ASTELIN) 0.1 % nasal spray USE 2 SPRAYS IN NOSTRIL 2 TIMES DAILY AS NEEDED.  Qty: 30 mL, Refills: 11    Associated Diagnoses: Rash      cetirizine (ZYRTEC) 10 MG tablet TAKE ONE TABLET BY MOUTH ONCE DAILY AS NEEDED  Qty: 90 tablet, Refills: 3    Associated Diagnoses: Chronic rhinitis      donepezil (ARICEPT) 10 MG tablet Take 2 tablets (20 mg) by mouth At Bedtime Stop if diarrhea or nausea develop  Qty: 180 tablet, Refills: 1    Associated Diagnoses: Memory difficulties      folic acid (FOLVITE) 1 MG tablet Take 5 tablets (5 mg) by mouth daily  Qty: 150 tablet, Refills: 3    Associated Diagnoses: Major depressive disorder, recurrent episode, in full remission (H)      guanFACINE (TENEX) 1 MG tablet Take 1 tablet (1 mg) by mouth At Bedtime  Qty: 90 tablet, Refills: 0    Associated Diagnoses: Major depressive disorder, recurrent episode, moderate (H)      levothyroxine (SYNTHROID/LEVOTHROID) 150 MCG tablet Take 1 tablet (150 mcg) by mouth daily  Qty: 90 tablet, Refills: 3    Associated Diagnoses: Acquired hypothyroidism      liothyronine (CYTOMEL) 25 MCG tablet Take 1 tablet (25 mcg) by mouth daily  Qty: 30 tablet, Refills: 3    Associated Diagnoses: Major depressive disorder, recurrent episode, in full remission (H)      melatonin 3 MG tablet Take 1 tablet (3 mg) by mouth nightly as needed for sleep  Qty: 90 tablet, Refills: 1    Associated Diagnoses: Major depressive disorder, recurrent episode, in full remission (H)      memantine (NAMENDA) 10 MG tablet Take 10 mg by mouth 2 times daily      mirabegron (MYRBETRIQ) 50 MG 24 hr tablet  Take 1 tablet (50 mg) by mouth daily  Qty: 90 tablet, Refills: 3    Associated Diagnoses: Overactive bladder      propafenone (RYTHMOL) 150 MG TABS tablet Take 1 tablet (150 mg) by mouth every 8 hours  Qty: 270 tablet, Refills: 0    Associated Diagnoses: Chronic atrial fibrillation (H)      senna-docusate (SENOKOT-S;PERICOLACE) 8.6-50 MG per tablet Take 1 tablet by mouth 2 times daily as needed for constipation  Qty: 60 tablet, Refills: 11    Associated Diagnoses: Constipation, unspecified constipation type      simvastatin (ZOCOR) 40 MG tablet Take 1 tablet (40 mg) by mouth At Bedtime. Need to update cholesterol level before additional refills.  Qty: 90 tablet, Refills: 0    Associated Diagnoses: Hyperlipidemia LDL goal <130      venlafaxine (EFFEXOR-XR) 150 MG 24 hr capsule Take 2 capsules (300 mg) by mouth daily  Qty: 180 capsule, Refills: 3    Comments: Appeal was approved.  Associated Diagnoses: Major depressive disorder, recurrent episode, moderate (H)      warfarin ANTICOAGULANT (COUMADIN) 5 MG tablet Take 1.5 tablets (7.5 mg) every Sun, Thurs; 1 tablet (5 mg) all other days or as directed by INR Clinic.  Qty: 105 tablet    Associated Diagnoses: Atrial fibrillation (H); Aortic valve prosthesis present         STOP taking these medications       cyclobenzaprine (FLEXERIL) 10 MG tablet Comments:   Reason for Stopping:         diazepam (VALIUM) 2 MG tablet Comments:   Reason for Stopping:         triamcinolone (KENALOG) 0.1 % external cream Comments:   Reason for Stopping:             Allergies   Allergies   Allergen Reactions     Gabapentin      Severe behavioral disturbances

## 2021-02-08 NOTE — PLAN OF CARE
Physical Therapy Discharge Summary    Reason for therapy discharge:    Discharged to transitional care facility.    Progress towards therapy goal(s). See goals on Care Plan in [facility] electronic health record for goal details.  Goals partially met.  Barriers to achieving goals:   discharge from facility.    Therapy recommendation(s):    Continued therapy is recommended.  Rationale/Recommendations:  PT as indicated at TCU.

## 2021-02-08 NOTE — PHARMACY-ANTICOAGULATION SERVICE
Clinical Pharmacy- Warfarin Discharge Note  This patient is currently on warfarin for the treatment of Mechanical heart valve.  INR Goal= 2.5-3.5  Expected length of therapy lifetime.    Warfarin PTA Regimen: 7.5 mg On Sun and Thurs, 5 mg all other days      Anticoagulation Dose History     Recent Dosing and Labs Latest Ref Rng & Units 1/28/2021 2/3/2021 2/4/2021 2/5/2021 2/6/2021 2/7/2021 2/8/2021    Warfarin 5 mg - - 5 mg - - - - -    Warfarin 7.5 mg - - - 7.5 mg 7.5 mg 7.5 mg 7.5 mg -    INR 0.86 - 1.14 1.17(H) 0.92 0.97 1.02 1.18(H) 1.44(H) 1.58(H)    INR 0.86 - 1.14 - - - - - - -          Vitamin K doses administered during the last 7 days: 1/24-2.5mg, 1/25-1 mg  FFP administered during the last 7 days: -----      Reviewed PTA, inpt and discharge meds.  Agree w/MD plan to discharge pt on PTA warfarin dose and lovenox bridge until INR>2.5.  Recommend INR check at [facility] on Wednesday, 2/10 (added to AVS).

## 2021-02-08 NOTE — PLAN OF CARE
Vss. Afebrile. Lungs diminished-room air mid 90s. +bs/+gas, no nausea. Tolerating diet. Last bm 02/06/21. Voiding. No iv access. Drsg change completed with sterile technique. Small amount serosang drainage noted to old drsg. Incisions-intact staples with little redness/edema. Cms-+1 edema, +1 pulses, & cool toes. Pain managed with Oxycodone. Orthotic brace on at all times except during assessments/drsg changes. Tolerating ice & elevation on wedge. Repositioning self. [facility] at 1330 today. No other significant issues noted overnight.

## 2021-02-08 NOTE — PLAN OF CARE
A/O x 4.  VSS, afebrile.  Denies pain.  Up with SBA, WW and boot to LLE.  Dressing changed per orders, CDI.  CMS intact, baseline neuropathy.  Voiding adequately via urinal.  Tolerating regular diet.  Reviewed discharge instructions and medications with patient, sent to TCU with copy.  Picked up and transferred to [facility] via [facility] at 1330.  Wife updated.      1445: report given to SHYANN Doll at [facility]

## 2021-02-08 NOTE — DISCHARGE INSTRUCTIONS
LLE wounds: Monday/Wednesday/Friday  1. Cleanse with wound cleanser and dry   2. Apply Vaseline gauze over superficial wound between deeper wounds, cover with VAC drape  3. Apply Wound VAC dressing-one foam in each wound  4. Bridge wounds together  5. Settin mmHg continuous     Recommend INR check Wednesday, February 10, 2021.    PT REPORTS HE WAS ADMITTED WITH PILLS AND THEY WERE PLACED IN A LARGE SEALED BAG AND SENT TO PHARMACY. PLEASE MAKE SURE PATIENT DISCHARGED WITH THIS BAG. THANKS      Patient to discharge to [facility]   [place]  [place] [address]  Phone number is: [phone]   Fax number is [phone]

## 2021-02-08 NOTE — PLAN OF CARE
"/79 (BP Location: Left arm)   Pulse 66   Temp 99.2  F (37.3  C) (Temporal)   Resp 16   Ht 1.854 m (6' 1\")   Wt 142 kg (313 lb 1.6 oz)   SpO2 94%   BMI 41.31 kg/m    Orientation: A&Ox4  Resp: LS Diminished, RA  Pain: Managed with Tylenol  CMS: Intact, baseline neuropathy  Dressing: CDI  Activity: A1, walker/gb, boot.  Diet: tolerating regular  Voiding: spontaneously without difficulty  Discharge Plan: TCU tomorrow at 130     "

## 2021-02-09 ENCOUNTER — OFFICE VISIT - HEALTHEAST (OUTPATIENT)
Dept: GERIATRICS | Facility: CLINIC | Age: 66
End: 2021-02-09

## 2021-02-09 ENCOUNTER — MEDICAL CORRESPONDENCE (OUTPATIENT)
Dept: HEALTH INFORMATION MANAGEMENT | Facility: CLINIC | Age: 66
End: 2021-02-09

## 2021-02-09 ENCOUNTER — AMBULATORY - HEALTHEAST (OUTPATIENT)
Dept: GERIATRICS | Facility: CLINIC | Age: 66
End: 2021-02-09

## 2021-02-09 ENCOUNTER — PATIENT OUTREACH (OUTPATIENT)
Dept: CARE COORDINATION | Facility: CLINIC | Age: 66
End: 2021-02-09

## 2021-02-09 ENCOUNTER — COMMUNICATION - HEALTHEAST (OUTPATIENT)
Dept: GERIATRICS | Facility: CLINIC | Age: 66
End: 2021-02-09

## 2021-02-09 ENCOUNTER — DOCUMENTATION ONLY (OUTPATIENT)
Dept: ANTICOAGULATION | Facility: CLINIC | Age: 66
End: 2021-02-09

## 2021-02-09 DIAGNOSIS — R53.81 PHYSICAL DECONDITIONING: ICD-10-CM

## 2021-02-09 DIAGNOSIS — D64.9 ANEMIA, UNSPECIFIED TYPE: ICD-10-CM

## 2021-02-09 DIAGNOSIS — Z79.01 LONG TERM CURRENT USE OF ANTICOAGULANT THERAPY: ICD-10-CM

## 2021-02-09 DIAGNOSIS — S81.802D WOUND OF LEFT LOWER EXTREMITY, SUBSEQUENT ENCOUNTER: ICD-10-CM

## 2021-02-09 DIAGNOSIS — Z71.89 OTHER SPECIFIED COUNSELING: ICD-10-CM

## 2021-02-09 DIAGNOSIS — I48.20 CHRONIC ATRIAL FIBRILLATION (H): ICD-10-CM

## 2021-02-09 DIAGNOSIS — Z95.2 AORTIC VALVE PROSTHESIS PRESENT: ICD-10-CM

## 2021-02-09 DIAGNOSIS — I48.91 ATRIAL FIBRILLATION (H): ICD-10-CM

## 2021-02-09 LAB — INR PPP: 1.71 (ref 0.9–1.1)

## 2021-02-09 NOTE — PROGRESS NOTES
Clinic Care Coordination Contact  Community Health Worker Initial Outreach    Patient was discharged to TCU. CHW scheduled TCU follow up with CC RN. No CHW outreach will be conducted today.

## 2021-02-09 NOTE — PROGRESS NOTES
ANTICOAGULATION  MANAGEMENT: Discharge Review    Todd S Aschoff chart reviewed for anticoagulation continuity of care    Hospital Admission on 1/22/21 for weakness, confusion and acute CVA, s/p infected hematoma that required I&D on 1/10.    Discharge disposition: TCU    Results:    Recent labs: (last 7 days)     02/03/21  0535 02/04/21  0640 02/05/21  0547 02/06/21  0611 02/07/21  0656 02/08/21  0623   INR 0.92 0.97 1.02 1.18* 1.44* 1.58*     Anticoagulation inpatient management:     reversed with Vitamin K on2.5 mg on 1/24 and 1 mg on 1/25    Anticoagulation discharge instructions:     Warfarin dosing: home regimen continued   Bridging: bridging with enoxaparin (Lovenox)   INR goal change: No      Medication changes affecting anticoagulation: No  Additional factors affecting anticoagulation:     Yes: left leg wound continues to heal with the assistance of a wound vac.    Plan     No adjustment to anticoagulation plan needed    Patient not contacted.  Patient is in TCU and will have provider there managing warfarin dosing.    Anticoagulation Calendar updated    Amanda Wheeler RN

## 2021-02-11 ENCOUNTER — RECORDS - HEALTHEAST (OUTPATIENT)
Dept: LAB | Facility: CLINIC | Age: 66
End: 2021-02-11

## 2021-02-11 ENCOUNTER — OFFICE VISIT - HEALTHEAST (OUTPATIENT)
Dept: GERIATRICS | Facility: CLINIC | Age: 66
End: 2021-02-11

## 2021-02-11 ENCOUNTER — TRANSFERRED RECORDS (OUTPATIENT)
Dept: HEALTH INFORMATION MANAGEMENT | Facility: CLINIC | Age: 66
End: 2021-02-11

## 2021-02-11 DIAGNOSIS — S81.802D WOUND OF LEFT LOWER EXTREMITY, SUBSEQUENT ENCOUNTER: ICD-10-CM

## 2021-02-11 DIAGNOSIS — Z95.2 AORTIC VALVE PROSTHESIS PRESENT: ICD-10-CM

## 2021-02-11 DIAGNOSIS — N32.81 OAB (OVERACTIVE BLADDER): ICD-10-CM

## 2021-02-11 DIAGNOSIS — F32.A DEPRESSIVE DISORDER: ICD-10-CM

## 2021-02-11 DIAGNOSIS — E03.9 ACQUIRED HYPOTHYROIDISM: ICD-10-CM

## 2021-02-11 DIAGNOSIS — N28.9 RENAL INSUFFICIENCY: ICD-10-CM

## 2021-02-11 DIAGNOSIS — I48.20 CHRONIC ATRIAL FIBRILLATION (H): ICD-10-CM

## 2021-02-11 DIAGNOSIS — Z79.01 LONG TERM CURRENT USE OF ANTICOAGULANT THERAPY: ICD-10-CM

## 2021-02-11 ASSESSMENT — MIFFLIN-ST. JEOR: SCORE: 2231.89

## 2021-02-12 ENCOUNTER — COMMUNICATION - HEALTHEAST (OUTPATIENT)
Dept: GERIATRICS | Facility: CLINIC | Age: 66
End: 2021-02-12

## 2021-02-12 LAB — INR PPP: 1.97 (ref 0.9–1.1)

## 2021-02-14 ENCOUNTER — RECORDS - HEALTHEAST (OUTPATIENT)
Dept: LAB | Facility: CLINIC | Age: 66
End: 2021-02-14

## 2021-02-15 ENCOUNTER — MEDICAL CORRESPONDENCE (OUTPATIENT)
Dept: HEALTH INFORMATION MANAGEMENT | Facility: CLINIC | Age: 66
End: 2021-02-15

## 2021-02-15 ENCOUNTER — RECORDS - HEALTHEAST (OUTPATIENT)
Dept: LAB | Facility: CLINIC | Age: 66
End: 2021-02-15

## 2021-02-15 ENCOUNTER — COMMUNICATION - HEALTHEAST (OUTPATIENT)
Dept: GERIATRICS | Facility: CLINIC | Age: 66
End: 2021-02-15

## 2021-02-15 ENCOUNTER — OFFICE VISIT - HEALTHEAST (OUTPATIENT)
Dept: GERIATRICS | Facility: CLINIC | Age: 66
End: 2021-02-15

## 2021-02-15 DIAGNOSIS — F32.A DEPRESSIVE DISORDER: ICD-10-CM

## 2021-02-15 DIAGNOSIS — I48.20 CHRONIC ATRIAL FIBRILLATION (H): ICD-10-CM

## 2021-02-15 DIAGNOSIS — I63.9 CEREBROVASCULAR ACCIDENT (CVA), UNSPECIFIED MECHANISM (H): ICD-10-CM

## 2021-02-15 DIAGNOSIS — Z79.01 LONG TERM CURRENT USE OF ANTICOAGULANT THERAPY: ICD-10-CM

## 2021-02-15 DIAGNOSIS — R53.81 PHYSICAL DECONDITIONING: ICD-10-CM

## 2021-02-15 DIAGNOSIS — S81.802D WOUND OF LEFT LOWER EXTREMITY, SUBSEQUENT ENCOUNTER: ICD-10-CM

## 2021-02-15 DIAGNOSIS — D64.9 ANEMIA, UNSPECIFIED TYPE: ICD-10-CM

## 2021-02-15 DIAGNOSIS — Z95.2 AORTIC VALVE PROSTHESIS PRESENT: ICD-10-CM

## 2021-02-15 LAB — INR PPP: 2.65 (ref 0.9–1.1)

## 2021-02-16 ENCOUNTER — OFFICE VISIT - HEALTHEAST (OUTPATIENT)
Dept: GERIATRICS | Facility: CLINIC | Age: 66
End: 2021-02-16

## 2021-02-16 ENCOUNTER — TRANSFERRED RECORDS (OUTPATIENT)
Dept: HEALTH INFORMATION MANAGEMENT | Facility: CLINIC | Age: 66
End: 2021-02-16

## 2021-02-16 DIAGNOSIS — S81.802D WOUND OF LEFT LOWER EXTREMITY, SUBSEQUENT ENCOUNTER: ICD-10-CM

## 2021-02-16 DIAGNOSIS — R53.81 PHYSICAL DECONDITIONING: ICD-10-CM

## 2021-02-16 DIAGNOSIS — R52 PAIN MANAGEMENT: ICD-10-CM

## 2021-02-16 LAB
ANION GAP SERPL CALCULATED.3IONS-SCNC: 5 MMOL/L (ref 5–18)
BASOPHILS # BLD AUTO: 0 THOU/UL (ref 0–0.2)
BASOPHILS NFR BLD AUTO: 1 % (ref 0–2)
BUN SERPL-MCNC: 19 MG/DL (ref 8–22)
CALCIUM SERPL-MCNC: 9 MG/DL (ref 8.5–10.5)
CHLORIDE BLD-SCNC: 106 MMOL/L (ref 98–107)
CO2 SERPL-SCNC: 29 MMOL/L (ref 22–31)
CREAT SERPL-MCNC: 0.84 MG/DL (ref 0.7–1.3)
CREAT SERPL-MCNC: 0.84 MG/DL (ref 0.7–1.3)
EOSINOPHIL # BLD AUTO: 0.2 THOU/UL (ref 0–0.4)
EOSINOPHIL NFR BLD AUTO: 3 % (ref 0–6)
ERYTHROCYTE [DISTWIDTH] IN BLOOD BY AUTOMATED COUNT: 13.5 % (ref 11–14.5)
GFR SERPL CREATININE-BSD FRML MDRD: >60 ML/MIN/1.73M2
GFR SERPL CREATININE-BSD FRML MDRD: >60 ML/MIN/1.73M2
GLUCOSE BLD-MCNC: 95 MG/DL (ref 70–125)
GLUCOSE SERPL-MCNC: 95 MG/DL (ref 70–125)
HCT VFR BLD AUTO: 40.7 % (ref 40–54)
HGB BLD-MCNC: 13.4 G/DL (ref 14–18)
IMM GRANULOCYTES # BLD: 0 THOU/UL
IMM GRANULOCYTES NFR BLD: 0 %
LYMPHOCYTES # BLD AUTO: 1.9 THOU/UL (ref 0.8–4.4)
LYMPHOCYTES NFR BLD AUTO: 26 % (ref 20–40)
MCH RBC QN AUTO: 30.9 PG (ref 27–34)
MCHC RBC AUTO-ENTMCNC: 32.9 G/DL (ref 32–36)
MCV RBC AUTO: 94 FL (ref 80–100)
MONOCYTES # BLD AUTO: 0.7 THOU/UL (ref 0–0.9)
MONOCYTES NFR BLD AUTO: 9 % (ref 2–10)
NEUTROPHILS # BLD AUTO: 4.5 THOU/UL (ref 2–7.7)
NEUTROPHILS NFR BLD AUTO: 62 % (ref 50–70)
PLATELET # BLD AUTO: 232 THOU/UL (ref 140–440)
PMV BLD AUTO: 11.7 FL (ref 8.5–12.5)
POTASSIUM BLD-SCNC: 4.1 MMOL/L (ref 3.5–5)
POTASSIUM SERPL-SCNC: 4.1 MMOL/L (ref 3.5–5)
RBC # BLD AUTO: 4.34 MILL/UL (ref 4.4–6.2)
SODIUM SERPL-SCNC: 140 MMOL/L (ref 136–145)
WBC: 7.3 THOU/UL (ref 4–11)

## 2021-02-18 ENCOUNTER — RECORDS - HEALTHEAST (OUTPATIENT)
Dept: LAB | Facility: CLINIC | Age: 66
End: 2021-02-18

## 2021-02-18 ENCOUNTER — COMMUNICATION - HEALTHEAST (OUTPATIENT)
Dept: GERIATRICS | Facility: CLINIC | Age: 66
End: 2021-02-18

## 2021-02-18 ENCOUNTER — TRANSFERRED RECORDS (OUTPATIENT)
Dept: HEALTH INFORMATION MANAGEMENT | Facility: CLINIC | Age: 66
End: 2021-02-18

## 2021-02-18 ENCOUNTER — OFFICE VISIT - HEALTHEAST (OUTPATIENT)
Dept: GERIATRICS | Facility: CLINIC | Age: 66
End: 2021-02-18

## 2021-02-18 DIAGNOSIS — M54.6 BILATERAL THORACIC BACK PAIN: ICD-10-CM

## 2021-02-18 DIAGNOSIS — S81.802D WOUND OF LEFT LOWER EXTREMITY, SUBSEQUENT ENCOUNTER: ICD-10-CM

## 2021-02-18 DIAGNOSIS — I48.20 CHRONIC ATRIAL FIBRILLATION (H): ICD-10-CM

## 2021-02-18 DIAGNOSIS — N28.9 RENAL INSUFFICIENCY: ICD-10-CM

## 2021-02-18 DIAGNOSIS — N32.81 OAB (OVERACTIVE BLADDER): ICD-10-CM

## 2021-02-18 DIAGNOSIS — F32.A DEPRESSIVE DISORDER: ICD-10-CM

## 2021-02-18 DIAGNOSIS — Z79.01 LONG TERM CURRENT USE OF ANTICOAGULANT THERAPY: ICD-10-CM

## 2021-02-19 ENCOUNTER — COMMUNICATION - HEALTHEAST (OUTPATIENT)
Dept: GERIATRICS | Facility: CLINIC | Age: 66
End: 2021-02-19

## 2021-02-19 LAB
INR PPP: 2.81 (ref 0.9–1.1)
INR PPP: 2.81 (ref 0.9–1.1)

## 2021-02-22 ENCOUNTER — AMBULATORY - HEALTHEAST (OUTPATIENT)
Dept: GERIATRICS | Facility: CLINIC | Age: 66
End: 2021-02-22

## 2021-02-22 DIAGNOSIS — R41.3 MEMORY DIFFICULTIES: ICD-10-CM

## 2021-02-22 DIAGNOSIS — M62.81 GENERALIZED MUSCLE WEAKNESS: ICD-10-CM

## 2021-02-22 DIAGNOSIS — Z95.2 AORTIC VALVE PROSTHESIS PRESENT: ICD-10-CM

## 2021-02-22 DIAGNOSIS — J31.0 CHRONIC RHINITIS: ICD-10-CM

## 2021-02-22 DIAGNOSIS — F33.42 MAJOR DEPRESSIVE DISORDER, RECURRENT EPISODE, IN FULL REMISSION (H): ICD-10-CM

## 2021-02-22 DIAGNOSIS — I48.91 ATRIAL FIBRILLATION (H): ICD-10-CM

## 2021-02-22 RX ORDER — LAMOTRIGINE 25 MG/1
25 TABLET ORAL DAILY
Qty: 90 TABLET | Refills: 1 | Status: SHIPPED | OUTPATIENT
Start: 2021-02-22 | End: 2021-05-24

## 2021-02-22 RX ORDER — WARFARIN SODIUM 5 MG/1
TABLET ORAL
Qty: 105 TABLET | Refills: 0 | Status: SHIPPED | OUTPATIENT
Start: 2021-02-22 | End: 2021-04-18

## 2021-02-22 RX ORDER — CETIRIZINE HYDROCHLORIDE 10 MG/1
TABLET ORAL
Qty: 90 TABLET | Refills: 3 | Status: ON HOLD | OUTPATIENT
Start: 2021-02-22 | End: 2021-05-17

## 2021-02-22 RX ORDER — FOLIC ACID 1 MG/1
5 TABLET ORAL DAILY
Qty: 150 TABLET | Refills: 3 | Status: SHIPPED | OUTPATIENT
Start: 2021-02-22 | End: 2021-06-30

## 2021-02-22 RX ORDER — LIOTHYRONINE SODIUM 25 UG/1
25 TABLET ORAL DAILY
Qty: 30 TABLET | Refills: 3 | Status: SHIPPED | OUTPATIENT
Start: 2021-02-22 | End: 2021-06-30

## 2021-02-22 RX ORDER — DONEPEZIL HYDROCHLORIDE 10 MG/1
20 TABLET, FILM COATED ORAL AT BEDTIME
Qty: 180 TABLET | Refills: 1 | Status: SHIPPED | OUTPATIENT
Start: 2021-02-22 | End: 2021-05-24

## 2021-02-22 NOTE — TELEPHONE ENCOUNTER
Cetirizine:   Prescription approved per [facility] Refill Protocol.     Donepezil, Liothyronine, Folic acid, Lamotrigine:  Routing refill request to provider for review/approval because:  Ordered in hospital or TCU (routed to covering provider to review)    Warfarin:   Routed to INR nurse to review.

## 2021-02-22 NOTE — TELEPHONE ENCOUNTER
Wife calling to check on attached refill requests. Pt will be completely out of Lamotrigine and needs this done ASAP. Please advise. Thanks.

## 2021-02-23 ENCOUNTER — TELEPHONE (OUTPATIENT)
Dept: INTERNAL MEDICINE | Facility: CLINIC | Age: 66
End: 2021-02-23

## 2021-02-23 NOTE — TELEPHONE ENCOUNTER
Shea from [facility]. ([phone]) calls to confirm that Dr. Ingram will follow patients care not that he is released from TCU.  Please advise.  OK to leave a message.

## 2021-02-24 ENCOUNTER — ANTICOAGULATION THERAPY VISIT (OUTPATIENT)
Dept: INTERNAL MEDICINE | Facility: CLINIC | Age: 66
End: 2021-02-24

## 2021-02-24 DIAGNOSIS — I48.20 CHRONIC ATRIAL FIBRILLATION (H): ICD-10-CM

## 2021-02-24 DIAGNOSIS — Z79.01 LONG TERM CURRENT USE OF ANTICOAGULANT THERAPY: ICD-10-CM

## 2021-02-24 DIAGNOSIS — Z95.2 AORTIC VALVE PROSTHESIS PRESENT: ICD-10-CM

## 2021-02-24 DIAGNOSIS — I48.91 ATRIAL FIBRILLATION (H): ICD-10-CM

## 2021-02-24 LAB — INR PPP: 4.2 (ref 0.9–1.1)

## 2021-02-24 NOTE — PROGRESS NOTES
ANTICOAGULATION MANAGEMENT     Patient Name:  Todd S Aschoff  Date:  2021    ASSESSMENT /SUBJECTIVE:    Today's INR result of 4.2 is supratherapeutic. Goal INR of 2.5-3.5      Warfarin dose taken: Warfarin taken as instructed    Diet: No new diet changes affecting INR    Medication changes/ interactions: Tylenol 3,000 mg daily. Tylenol can raise INR when taken in large doses.    Previous INR: Therapeutic     S/S of bleeding or thromboembolism: No    New injury or illness: LLE pain from recent cellulitis.skin graft.    Upcoming surgery, procedure or cardioversion: No    Additional findings: None      PLAN:    Telephone call with home care nurse Carissa regarding INR result and instructed:     Warfarin Dosing Instructions: Take 2.5mg today then change your warfarin dose to 7.5mg every Sun; 5mg all other days  . (6.3 % change)    Instructed patient to follow up no later than: 1 week  Orders given to  Homecare nurse/facility to recheck    Education provided: Target INR goal and significance of current INR result      Carissa verbalizes understanding and agrees to warfarin dosing plan.    Instructed to call the Anticoagulation Clinic for any changes, questions or concerns. (#[phone])        Oscar Briscoe RN      OBJECTIVE:  Recent labs: (last 7 days)     21   INR 4.2*         No question data found.  Anticoagulation Summary  As of 2021    INR goal:  2.5-3.5   TTR:  54.9 % (10.5 mo)   INR used for dosin.2 (2021)   Warfarin maintenance plan:  7.5 mg (5 mg x 1.5) every Sun; 5 mg (5 mg x 1) all other days   Full warfarin instructions:  : 2.5 mg; Otherwise 7.5 mg every Sun; 5 mg all other days   Weekly warfarin total:  37.5 mg   Plan last modified:  Oscar Briscoe RN (2021)   Next INR check:  3/3/2021   Priority:  High   Target end date:  Indefinite    Indications    Aortic valve prosthesis present [Z95.2]  Atrial fibrillation (H) [I48.91]  Long term current use of anticoagulant therapy  [Z79.01]  Chronic atrial fibrillation (H) [I48.20]             Anticoagulation Episode Summary     INR check location:      Preferred lab:      Send INR reminders to:  AIDA GUERRERO    Comments:  5mg tabs / CALENDAR / needs bridging. may leave DVM or speak with Paul per signed consent      Anticoagulation Care Providers     Provider Role Specialty Phone number    Obdulio Ingram MD Referring Internal Medicine [phone]

## 2021-02-25 ENCOUNTER — TELEPHONE (OUTPATIENT)
Dept: INTERNAL MEDICINE | Facility: CLINIC | Age: 66
End: 2021-02-25

## 2021-02-25 DIAGNOSIS — F33.1 MAJOR DEPRESSIVE DISORDER, RECURRENT EPISODE, MODERATE (H): ICD-10-CM

## 2021-02-25 DIAGNOSIS — T14.8XXA INFECTION OF WOUND HEMATOMA: Primary | ICD-10-CM

## 2021-02-25 DIAGNOSIS — L03.116 CELLULITIS OF LEFT LOWER EXTREMITY: ICD-10-CM

## 2021-02-25 DIAGNOSIS — R41.3 MEMORY DIFFICULTIES: ICD-10-CM

## 2021-02-25 DIAGNOSIS — L08.9 INFECTION OF WOUND HEMATOMA: Primary | ICD-10-CM

## 2021-02-25 NOTE — LETTER
[facility] [address], [address]  [place], [place]  [address]                                            TEL:[phone]  FAX:[phone]        3/1/2021    Re: Todd S Aschoff  [address]  [place] [address]  : 1955       To Whom It May Concern:    Please see the orders below for Todd S Aschoff.    Apply Xeroform to wound BID.    Please call our office with any further questions at [phone].    Sincerely,          Obdulio Ingram M.D.

## 2021-02-25 NOTE — TELEPHONE ENCOUNTER
Spoke with Harmony to clarify her request for wound care orders.    She states she was able to find wound care orders in chart from surgeon so will use that order.  May need to call clinic in the future if needs additional wound care orders--wound care referral.

## 2021-02-25 NOTE — TELEPHONE ENCOUNTER
[facility] calling for wound care orders.  Patient recently had skin xu surgery.  Harmony BOOTHE needs orders for wound care  Ok to call and  [phone]

## 2021-02-25 NOTE — TELEPHONE ENCOUNTER
Harmony calling back and said the family wants a wound clinic referral done. The surgeon told Harmony to contact us for this referral.

## 2021-02-26 NOTE — TELEPHONE ENCOUNTER
Spoke to Harmony, informed her home care wound care order placed. She states patient needs order to see the Wound Clinic, not home care wound nurse.     They are struggling with wound care for patient as the surgeon no longer wants to follow patient and ask if Dr. Ingram could provide dressing orders for patient's upper left thigh wound (donor site) until he sees the wound clinic. They are supposed to be placing Xeroform to the donor site wound twice daily, but cannot order this without written order from provider. She asks if Dr. Ingram could fax home care a written/signed order for this and then they could fax it to the [employer].   Fax: [phone]

## 2021-02-26 NOTE — TELEPHONE ENCOUNTER
Many types of Xeroform available to choose from on Epic (eg, oil emulsion or petrolatum, with a variety of pad/gauze sizes).  Could we get the specifics of what is needed--I will then E-prescribe Rx.

## 2021-03-01 ENCOUNTER — TELEPHONE (OUTPATIENT)
Dept: WOUND CARE | Facility: CLINIC | Age: 66
End: 2021-03-01

## 2021-03-01 NOTE — TELEPHONE ENCOUNTER
Patient calling and wants home care to come to his home to treat his wound. Please advise. Ok to call and [phone]

## 2021-03-01 NOTE — TELEPHONE ENCOUNTER
Home Care needs order signed by provider.     Letter placed on Dr. Ingram's desk to sign when he is in the office on Tuesday. Letter can then be faxed to [phone].

## 2021-03-01 NOTE — TELEPHONE ENCOUNTER
"Contacted Harmony and asked her for more specifics regarding Xeroform orders. Harmony states they only need the written/signed order from Dr. Ingram to state \"apply Xeroform to wound BID.\" They can forward the order to [facility] and provide of all the other information needed to order this.   "

## 2021-03-01 NOTE — TELEPHONE ENCOUNTER
Called to schedule patient, unable to transport to [place]. Patient will discuss with Dr. Ingram to inquire about seeing a wound specialist closer to his home.

## 2021-03-01 NOTE — TELEPHONE ENCOUNTER
Pt called the clinic back and stated he is able to get a ride to the [place] location. We discussed scheduling an appt for 3/4 at 2:40pm but this writer needed to verify the appt was available. Ok was given to schedule pt for that appt. Attempted to call pt back x 3 at home number and cell number and voicemail left. Pt scheduled for 3/4 at 2:40 but pt is not yet aware.

## 2021-03-01 NOTE — TELEPHONE ENCOUNTER
Patient in work que, referral from Dr. Ingram, for left lower leg wound. Patient had a hematoma evacuated in the OR on 1-26-21 with [facility] on 2-3-21. Patient no longer wants to follow up with the original surgeon. Please set up for an in clinic visit with Dr. Calvin or Patti Golden PA-C.

## 2021-03-03 ENCOUNTER — ANTICOAGULATION THERAPY VISIT (OUTPATIENT)
Dept: INTERNAL MEDICINE | Facility: CLINIC | Age: 66
End: 2021-03-03

## 2021-03-03 DIAGNOSIS — I48.20 CHRONIC ATRIAL FIBRILLATION (H): ICD-10-CM

## 2021-03-03 DIAGNOSIS — Z95.2 AORTIC VALVE PROSTHESIS PRESENT: ICD-10-CM

## 2021-03-03 DIAGNOSIS — Z79.01 LONG TERM CURRENT USE OF ANTICOAGULANT THERAPY: ICD-10-CM

## 2021-03-03 DIAGNOSIS — I48.91 ATRIAL FIBRILLATION (H): ICD-10-CM

## 2021-03-03 LAB — INR PPP: 4.6 (ref 0.9–1.1)

## 2021-03-03 NOTE — PROGRESS NOTES
ANTICOAGULATION MANAGEMENT     Patient Name:  Todd S Aschoff  Date:  3/3/2021    ASSESSMENT /SUBJECTIVE:    Today's INR result of 4.6 is supratherapeutic. Goal INR of 2.5-3.5      Warfarin dose taken: Warfarin taken as instructed    Diet: No new diet changes affecting INR    Medication changes/ interactions: Continues to take 3,000mg of tylenol daily    Previous INR: Supratherapeutic     S/S of bleeding or thromboembolism: No    New injury or illness: No    Upcoming surgery, procedure or cardioversion: No    Additional findings: None      PLAN:    Telephone call with home care nurse Krystal regarding INR result and instructed:     Warfarin Dosing Instructions: Hold warfarin today then change your warfarin dose to 2.5mg every Sat; 5mg all other days  . (13.3 % change)    Instructed patient to follow up no later than: 1 week  Orders given to  Homecare nurse/facility to recheck    Education provided: Target INR goal and significance of current INR result and Potential interaction between warfarin and Tylenol      Krystal verbalizes understanding and agrees to warfarin dosing plan.    Instructed to call the Anticoagulation Clinic for any changes, questions or concerns. (#[phone])        Oscar Briscoe RN      OBJECTIVE:  Recent labs: (last 7 days)     21   INR 4.6*         No question data found.  Anticoagulation Summary  As of 3/3/2021    INR goal:  2.5-3.5   TTR:  54.9 % (10.5 mo)   INR used for dosin.6 (3/3/2021)   Warfarin maintenance plan:  2.5 mg (5 mg x 0.5) every Sat; 5 mg (5 mg x 1) all other days   Full warfarin instructions:  3/3: Hold; Otherwise 2.5 mg every Sat; 5 mg all other days   Weekly warfarin total:  32.5 mg   Plan last modified:  Oscar Briscoe, RN (3/3/2021)   Next INR check:  3/10/2021   Priority:  High   Target end date:  Indefinite    Indications    Aortic valve prosthesis present [Z95.2]  Atrial fibrillation (H) [I48.91]  Long term current use of anticoagulant therapy  [Z79.01]  Chronic atrial fibrillation (H) [I48.20]             Anticoagulation Episode Summary     INR check location:      Preferred lab:      Send INR reminders to:  AIDA GUERRERO    Comments:  5mg tabs / CALENDAR / needs bridging. may leave DVM or speak with Paul per signed consent      Anticoagulation Care Providers     Provider Role Specialty Phone number    Obdulio Ingram MD Referring Internal Medicine [phone]

## 2021-03-04 NOTE — TELEPHONE ENCOUNTER
Patient called, he is scheduled to go into the wound clinic in [place] on 3/16/21 but he is not able to go to the clinic (no transportation). He was under the assumption that he would be getting home care for his wound. Please call the wound clinic at [phone] at let them know it should be home care, not a clinic visit.

## 2021-03-05 DIAGNOSIS — Z79.899 CONTROLLED SUBSTANCE AGREEMENT SIGNED: ICD-10-CM

## 2021-03-05 DIAGNOSIS — M62.838 MUSCLE SPASM: ICD-10-CM

## 2021-03-05 DIAGNOSIS — M62.830 BACK MUSCLE SPASM: ICD-10-CM

## 2021-03-08 ENCOUNTER — PATIENT OUTREACH (OUTPATIENT)
Dept: CARE COORDINATION | Facility: CLINIC | Age: 66
End: 2021-03-08

## 2021-03-08 ENCOUNTER — TELEPHONE (OUTPATIENT)
Dept: INTERNAL MEDICINE | Facility: CLINIC | Age: 66
End: 2021-03-08

## 2021-03-08 DIAGNOSIS — Z95.2 AORTIC VALVE PROSTHESIS PRESENT: ICD-10-CM

## 2021-03-08 DIAGNOSIS — I48.91 ATRIAL FIBRILLATION, UNSPECIFIED TYPE (H): ICD-10-CM

## 2021-03-08 DIAGNOSIS — S81.802D OPEN WOUND OF LOWER LIMB, LEFT, SUBSEQUENT ENCOUNTER: Primary | ICD-10-CM

## 2021-03-08 DIAGNOSIS — G31.84 MILD COGNITIVE IMPAIRMENT: ICD-10-CM

## 2021-03-08 DIAGNOSIS — Z79.01 LONG TERM CURRENT USE OF ANTICOAGULANT THERAPY: ICD-10-CM

## 2021-03-08 NOTE — PROGRESS NOTES
Clinic Care Coordination Contact  [facility]/Voicemail       Clinical Data: Care Coordinator Outreach  Outreach attempted x 1.  Left message on patient's voicemail with call back information and requested return call.  Plan:  Care Coordinator will try to reach patient again in 3-5 business days.    Tasha Calero, RN Care Coordinator  [facility]  Email: [email or web address]  Phone: [phone]

## 2021-03-08 NOTE — TELEPHONE ENCOUNTER
This RN called Harmony, RN with [facility] regarding wound care and patient's request for wound care through home care. She will follow-up with patient's family and call back.     Harmony calls back, she states that she spoke to patient's wife, Lissy and she is wanting patient to be referred to [facility] as they have [facility] RNs available. However, [facility] would need to take over all patient's home care needs including INR visits. Harmony states they can keep patient enrolled in their home care until they know if [facility] can take over his care. Patient's wife also is requesting a call from this RN.     Contacted Lissy. She states patient is not able to get out of the house, but has appointment with Dr. Ingram on 3/12/21. She asks if appointment with Dr. Ingram can be changed to Virtual Visit. Advised Lissy we can do this - appointment changed to video visit for patient.     Also discussed COVID-19 vaccine and informed her that this RN does not know how to get patient his second COVID-19 vaccine since the state is only providing vaccines to patients 65 and older (he received Moderna vaccine on 2/8/21 while in the TCU). Recommended they call [facility] to discuss if there are any options or if patient will need to wait until he qualifies for the vaccine.    Patient would need new referral to transfer care to [facility] for home care, wound care, INR etc. Routed to Dr. Ingram to review.

## 2021-03-08 NOTE — TELEPHONE ENCOUNTER
Per previous message, SHYANN Antunez reported that patient's family wanted patient to be seen by wound clinic.     Called Harmony, she states she had been communicating with patient's wife regarding wound care and his daughter wanted him seen by wound clinic. Advised Harmony that patient is reporting he is not able to get to the wound clinic and requesting home care provide wound care. Harmony states their RNs can provide wound care, but they do not have WOC RNs and would need orders from a provider for cares. She will follow-up with his wife and call back with an update.

## 2021-03-08 NOTE — TELEPHONE ENCOUNTER
Pending Prescriptions:                       Disp   Refills    cyclobenzaprine (FLEXERIL) 10 MG tablet [P*270 ta*0        Sig: TAKE ONE TABLET BY MOUTH THREE TIMES DAILY AS NEEDED           FOR MUSCLE SPASM.    Routing refill request to provider for review/approval because:  Drug not on the [facility] refill protocol

## 2021-03-08 NOTE — TELEPHONE ENCOUNTER
Diazepam  Routing refill request to provider for review/approval because:  Drug not active on patient's medication list

## 2021-03-08 NOTE — TELEPHONE ENCOUNTER
Patient is calling back because he hasn't heard back from us about him being able to get his wound care in his home.    Also he received his first covid shot in the TCU and is suppose to get his second shot on 3/19/21 but he doesn't know where to do this.

## 2021-03-09 RX ORDER — CYCLOBENZAPRINE HCL 10 MG
TABLET ORAL
Qty: 270 TABLET | Refills: 0 | Status: SHIPPED | OUTPATIENT
Start: 2021-03-09 | End: 2021-06-18

## 2021-03-09 NOTE — TELEPHONE ENCOUNTER
Lissy advised referral placed to [facility] and that Harmony with [facility] had told this RN yesterday that they would stay involved in patient's care until [facility] can take over.     Called Harmony ([phone]) and left voice message advising her home care referral was approved by Dr. Ingram to [facility].

## 2021-03-09 NOTE — TELEPHONE ENCOUNTER
It looks as though this was discontinued at the time of his last hospital discharge on 2/8/2021.   I last prescribed 270 tabs on 10/2/2020.    Could we get some more information on how he has been taking the diazepam (and the cyclobenzaprine), eg, # tabs taken, # times per day.  If possible, I'd like to discuss use of these meds with him at his appt on 2/12/2021.

## 2021-03-10 ENCOUNTER — ANTICOAGULATION THERAPY VISIT (OUTPATIENT)
Dept: INTERNAL MEDICINE | Facility: CLINIC | Age: 66
End: 2021-03-10

## 2021-03-10 DIAGNOSIS — I48.20 CHRONIC ATRIAL FIBRILLATION (H): ICD-10-CM

## 2021-03-10 DIAGNOSIS — I48.91 ATRIAL FIBRILLATION (H): ICD-10-CM

## 2021-03-10 DIAGNOSIS — Z95.2 AORTIC VALVE PROSTHESIS PRESENT: ICD-10-CM

## 2021-03-10 DIAGNOSIS — Z79.01 LONG TERM CURRENT USE OF ANTICOAGULANT THERAPY: ICD-10-CM

## 2021-03-10 LAB — INR PPP: 3.3 (ref 0.9–1.1)

## 2021-03-10 RX ORDER — DIAZEPAM 5 MG
TABLET ORAL
Qty: 60 TABLET | Refills: 0 | Status: SHIPPED | OUTPATIENT
Start: 2021-03-10 | End: 2021-04-18

## 2021-03-10 NOTE — PROGRESS NOTES
ANTICOAGULATION MANAGEMENT     Patient Name:  Todd S Aschoff  Date:  3/10/2021    ASSESSMENT /SUBJECTIVE:    Today's INR result of 3.3 is therapeutic. Goal INR of 2.5-3.5      Warfarin dose taken: Warfarin taken as instructed    Diet: No new diet changes affecting INR    Medication changes/ interactions: No new medications/supplements affecting INR    Previous INR: Supratherapeutic     S/S of bleeding or thromboembolism: No    New injury or illness: No    Upcoming surgery, procedure or cardioversion: No    Additional findings: None      PLAN:    Telephone call with home care nurse Krystal regarding INR result and instructed:     Warfarin Dosing Instructions: Continue your current warfarin dose 2.5 mg on sat and 5 mg all other days    Instructed patient to follow up no later than: 1 week  Orders given to  Homecare nurse/facility to recheck    Education provided: None required      Krystal, home care, verbalizes understanding and agrees to warfarin dosing plan.    Instructed to call the Anticoagulation Clinic for any changes, questions or concerns. (#[phone])        Britta Snowden RN      OBJECTIVE:  Recent labs: (last 7 days)     03/10/21   INR 3.3*         No question data found.  Anticoagulation Summary  As of 3/10/2021    INR goal:  2.5-3.5   TTR:  54.1 % (10.5 mo)   INR used for dosing:  3.3 (3/10/2021)   Warfarin maintenance plan:  2.5 mg (5 mg x 0.5) every Sat; 5 mg (5 mg x 1) all other days   Full warfarin instructions:  2.5 mg every Sat; 5 mg all other days   Weekly warfarin total:  32.5 mg   Plan last modified:  Oscar Briscoe RN (3/3/2021)   Next INR check:     Priority:  High   Target end date:  Indefinite    Indications    Aortic valve prosthesis present [Z95.2]  Atrial fibrillation (H) [I48.91]  Long term current use of anticoagulant therapy [Z79.01]  Chronic atrial fibrillation (H) [I48.20]             Anticoagulation Episode Summary     INR check location:      Preferred lab:      Send INR  reminders to:  AIDA GUERRERO    Comments:  5mg tabs / CALENDAR / needs bridging. may leave DVM or speak with Paul, per signed consent      Anticoagulation Care Providers     Provider Role Specialty Phone number    Obdulio Ingram MD Referring Internal Medicine [phone]

## 2021-03-10 NOTE — TELEPHONE ENCOUNTER
Patients wife Lissy calling. She states he was never taken off the medication. He has enough for today but then he will be out.   Patient has been taking 1 tablet by mouth every 8 hours but they only do every morning and night. 5 mg tablet   Lissy wants to know if they cant get just enough to get him by until Fridays appt. Please advise. Ok to call and  [phone]

## 2021-03-11 ENCOUNTER — PATIENT OUTREACH (OUTPATIENT)
Dept: NURSING | Facility: CLINIC | Age: 66
End: 2021-03-11
Payer: MEDICARE

## 2021-03-11 ENCOUNTER — MEDICAL CORRESPONDENCE (OUTPATIENT)
Dept: HEALTH INFORMATION MANAGEMENT | Facility: CLINIC | Age: 66
End: 2021-03-11

## 2021-03-11 ASSESSMENT — ACTIVITIES OF DAILY LIVING (ADL): DEPENDENT_IADLS:: CLEANING;COOKING;LAUNDRY;SHOPPING;MEAL PREPARATION;TRANSPORTATION

## 2021-03-11 NOTE — PROGRESS NOTES
Clinic Care Coordination Contact    Clinic Care Coordination Contact  OUTREACH    Referral Information:  Referral Source: IP Report         Chief Complaint   Patient presents with     Clinic Care Coordination - Initial        Universal Utilization: [facility] 1/22 - 2/8/21 for 1.  Acute stroke.  2.  Suspected seizure.  3.  Left lower extremity infected hematoma, cellulitis, and skin graft.  4.  Acute toxic/metabolic encephalopathy.  5.  Acute kidney injury.  6.  Atrial fibrillation.  7.  Acute blood loss anemia.  8.  Previous mechanical aortic valve.  9.  Hypothyroidism.  10.  Depression.  11.  Chronic pain syndrome.    TCU 2/8/21 - 2/19/21   Clinic Utilization  Difficulty keeping appointments:: No  Compliance Concerns: No  No-Show Concerns: No  No PCP office visit in Past Year: No  Utilization    Last refreshed: 3/10/2021  2:05 PM: Hospital Admissions 3           Last refreshed: 3/10/2021  2:05 PM: ED Visits 5           Last refreshed: 3/10/2021  2:05 PM: No Show Count (past year) 8              Current as of: 3/10/2021  2:05 PM              Clinical Concerns:  Current Medical Concerns:    Patient Active Problem List   Diagnosis     Atrial fibrillation (H)     Hypothyroidism     Long term current use of anticoagulant therapy     Aortic valve prosthesis present     HYPERLIPIDEMIA LDL GOAL <130     BPH (benign prostatic hypertrophy)     Chronic low back pain     Advanced directives, counseling/discussion     Health Care Home     Major depressive disorder, recurrent episode, in full remission (H)     Tourette syndrome     Obesity     Lumbar radiculopathy     Back pain     History of dissecting thoracic aneurysm repair     Lumbar surgical wound fluid collection     Memory difficulties     OAB (overactive bladder)     Long-term (current) use of anticoagulants [Z79.01]     Bilateral thoracic back pain     Obesity, BMI >35 with comorbidities     Alcohol dependence in remission (H)     Elevated prostate specific antigen  (PSA)     Family history of prostate cancer     Chronic atrial fibrillation (H)     Suicide attempt by multiple drug overdose, initial encounter (H)     Depression, unspecified depression type     Leg wound, left     Hematoma     Polypharmacy     Renal insufficiency     Generalized muscle weakness     Hypotension, unspecified hypotension type     Anemia, unspecified type       Current Behavioral Concerns: See above    Education Provided to patient: RN CC role and reason for call.       Health Maintenance Reviewed:    Clinical Pathway: None    Medication Management:  Current Outpatient Medications   Medication Instructions     acetaminophen (TYLENOL) 500-1,000 mg, Oral, EVERY 8 HOURS PRN     azelastine (ASTELIN) 0.1 % nasal spray USE 2 SPRAYS IN NOSTRIL 2 TIMES DAILY AS NEEDED.     cetirizine (ZYRTEC) 10 MG tablet TAKE ONE TABLET BY MOUTH ONCE DAILY AS NEEDED      cyclobenzaprine (FLEXERIL) 10 MG tablet TAKE ONE TABLET BY MOUTH THREE TIMES DAILY AS NEEDED FOR MUSCLE SPASM.     diazepam (VALIUM) 5 MG tablet TAKE ONE TABLET BY MOUTH EVERY EIGHT HOURS AS NEEDED FOR ANXIETY OR MUSCLE SPASM     donepezil (ARICEPT) 20 mg, Oral, AT BEDTIME, Stop if diarrhea or nausea develop     enoxaparin ANTICOAGULANT (LOVENOX) 1 mg/kg, Subcutaneous, EVERY 12 HOURS     folic acid (FOLVITE) 5 mg, Oral, DAILY     guanFACINE (TENEX) 1 mg, Oral, AT BEDTIME     lamoTRIgine (LAMICTAL) 25 mg, Oral, DAILY     levothyroxine (SYNTHROID/LEVOTHROID) 150 mcg, Oral, DAILY     liothyronine (CYTOMEL) 25 mcg, Oral, DAILY     melatonin 3 mg, Oral, AT BEDTIME PRN     memantine (NAMENDA) 10 mg, Oral, 2 TIMES DAILY     mirabegron (MYRBETRIQ) 50 mg, Oral, DAILY     oxyCODONE (ROXICODONE) 5 mg, Oral, EVERY 4 HOURS PRN     pregabalin (LYRICA) 100 mg, Oral, 3 TIMES DAILY, Do not take if sleepy/sedated.     propafenone (RYTHMOL) 150 mg, Oral, EVERY 8 HOURS     senna-docusate (SENOKOT-S;PERICOLACE) 8.6-50 MG per tablet 1 tablet, Oral, 2 TIMES DAILY PRN     silver  sulfADIAZINE (SILVADENE) 1 % external cream Topical, 2 TIMES DAILY     simvastatin (ZOCOR) 40 MG tablet Take 1 tablet (40 mg) by mouth At Bedtime. Need to update cholesterol level before additional refills.     venlafaxine (EFFEXOR-XR) 300 mg, Oral, DAILY     warfarin ANTICOAGULANT (COUMADIN) 5 MG tablet Take 1.5 tablets (7.5 mg) every Sun, Thurs; 1 tablet (5 mg) all other days or as directed by INR Clinic.          Functional Status:  Dependent ADLs:: Ambulation-cane, Bathing, Dressing, Grooming, Toileting  Dependent IADLs:: Cleaning, Cooking, Laundry, Shopping, Meal Preparation, Transportation  Bed or wheelchair confined:: No  Mobility Status: Independent  Fallen 2 or more times in the past year?: No  Any fall with injury in the past year?: Yes    Living Situation:  Current living arrangement:: I live in a private home with spouse  Type of residence:: [facility]    Lifestyle & Psychosocial Needs:  Lifestyle     Physical activity     Days per week: Not on file     Minutes per session: Not on file     Stress: Only a little     Social Needs     Financial resource strain: Not hard at all     Food insecurity     Worry: Never true     Inability: Never true     Transportation needs     Medical: No     Non-medical: No     Diet:: Regular  Inadequate nutrition (GOAL):: No  Tube Feeding: No  Inadequate activity/exercise (GOAL):: No  Significant changes in sleep pattern (GOAL): No  Transportation means:: Family     Rastafari or spiritual beliefs that impact treatment:: No  Mental health DX:: Yes  Mental health DX how managed:: Medication  Mental health management concern (GOAL):: No  Chemical Dependency Status: No Current Concerns  Informal Support system:: Spouse, Family   Socioeconomic History     Marital status:      Spouse name: Not on file     Number of children: 3     Years of education: Not on file     Highest education level: High school graduate   Occupational History     Employer: DISABLED     Comment:  Previous , disabled since ~2013   Relationships     Social connections     Talks on phone: Once a week     Gets together: Once a week     Attends Buddhist service: More than 4 times per year     Active member of club or organization: No     Attends meetings of clubs or organizations: Never     Relationship status:      Intimate partner violence     Fear of current or ex partner: No     Emotionally abused: No     Physically abused: No     Forced sexual activity: No     Tobacco Use     Smoking status: Current Every Day Smoker     Packs/day: 0.00     Years: 3.00     Pack years: 0.00     Types: Clove cigarettes or kreteks, Pipe, Cigars     Smokeless tobacco: Never Used   Substance and Sexual Activity     Alcohol use: No     Comment: Stopped drinking alcohol ~2009     Drug use: No     Sexual activity: Not Currently     Spoke with patient's wife Paul, consent to communicate on file. Patient is receiving HHC through private company. Patient is waiting to hear from [facility] wound care nurse to schedule with them. Spoke [facility] Care, they have not received order yet. According to chart order was placed 3/9/21. Messaged PCP to re-send order.     Paul declines care coordination stating they have all the support and resources they needs at this time.     Resources and Interventions:  Current Resources:   Skilled Home Care Services: Skilled Nursing, Physicial Therapy, Occupational Therapy  Community Resources: None  Supplies used at home:: None  Equipment Currently Used at Home: cane, straight, walker, standard, raised toilet seat  Employment Status: disabled)       Advance Care Plan/Directive  Advanced Care Plans/Directives on file:: No  Advanced Care Plan/Directive Status: Considering Options    Referrals Placed: None     Goals:    None.        Future Appointments              Tomorrow Obdulio Ingram MD M [place], [place]    In 1 month Nancy Flanagan CNP M [facility]  Neurology Clinic [facility]          Plan: Patient was provided with this writer's contact information and encouraged to call with questions, concerns, support needs. RN CC will remain available as needed. No further out reaches will be made at this time.     Tasha Calero RN Care Coordinator  [facility]  Email: [email or web address]  Phone: [phone]

## 2021-03-11 NOTE — TELEPHONE ENCOUNTER
Spoke with [facility]. She took a verbal order update from me to initiate Home Wound Care RN eval.

## 2021-03-11 NOTE — TELEPHONE ENCOUNTER
Per routing comment:    Hi there,     Spoke with wife Lissy, they have not been contacted by [facility] wound care nurse to schedule with them. Spoke [facility], they have not received order yet. According to chart order was placed 3/9/21. Could you please send again?     Thank you!   Tasha Calero, RN Care Coordinator   [facility]   Email: [email or web address]   Phone: [phone]         There is a referral for [facility] wound care in chart.  Spoke with Kem @ [facility] ([phone]).  States he will look into this and call back.

## 2021-03-12 ENCOUNTER — VIRTUAL VISIT (OUTPATIENT)
Dept: INTERNAL MEDICINE | Facility: CLINIC | Age: 66
End: 2021-03-12
Payer: COMMERCIAL

## 2021-03-12 VITALS — RESPIRATION RATE: 18 BRPM | HEIGHT: 73 IN | BODY MASS INDEX: 41.31 KG/M2

## 2021-03-12 DIAGNOSIS — Z95.2 AORTIC VALVE PROSTHESIS PRESENT: ICD-10-CM

## 2021-03-12 DIAGNOSIS — M62.830 BACK MUSCLE SPASM: ICD-10-CM

## 2021-03-12 DIAGNOSIS — I63.9 CEREBRAL INFARCTION, UNSPECIFIED MECHANISM (H): ICD-10-CM

## 2021-03-12 DIAGNOSIS — S81.802D OPEN WOUND OF LOWER LIMB, LEFT, SUBSEQUENT ENCOUNTER: ICD-10-CM

## 2021-03-12 DIAGNOSIS — Z79.01 LONG TERM CURRENT USE OF ANTICOAGULANT THERAPY: ICD-10-CM

## 2021-03-12 DIAGNOSIS — I48.20 CHRONIC ATRIAL FIBRILLATION (H): ICD-10-CM

## 2021-03-12 DIAGNOSIS — G31.84 MILD COGNITIVE IMPAIRMENT: Primary | ICD-10-CM

## 2021-03-12 PROBLEM — Z98.1 S/P LUMBAR FUSION: Status: ACTIVE | Noted: 2018-04-05

## 2021-03-12 PROBLEM — M48.061 SPINAL STENOSIS OF LUMBAR REGION: Status: ACTIVE | Noted: 2018-04-05

## 2021-03-12 PROBLEM — R53.81 PHYSICAL DECONDITIONING: Status: ACTIVE | Noted: 2021-02-09

## 2021-03-12 PROCEDURE — 99495 TRANSJ CARE MGMT MOD F2F 14D: CPT | Performed by: INTERNAL MEDICINE

## 2021-03-12 NOTE — PROGRESS NOTES
Nicko is a 65 year old who is being evaluated via a billable video visit.      How would you like to obtain your AVS? Mail a copy  If the video visit is dropped, the invitation should be resent by: Text to cell phone: [phone]  Will anyone else be joining your video visit? No      Video Start Time: 1629    Assessment & Plan   (G31.84) Mild cognitive impairment  (primary encounter diagnosis)  (I63.9) Cerebral infarction, unspecified mechanism (H)  Comment: Offered Neurology f/u as ordered.   Plan: NEUROLOGY ADULT REFERRAL          (B16.739E) Open wound of lower limb, left, subsequent encounter  Comment: Continue to follow with wound care RN.     (I48.20) Chronic atrial fibrillation (H)  (Z95.2) Aortic valve prosthesis present  (Z79.01) Long term current use of anticoagulant therapy  Comment: Continue warfarin.     (Q86.741) Back muscle spasm  Comment: Try to use tizanidine rather than diazepam. He is now off of opioids for pain.        Tobacco Cessation:   reports that he has been smoking clove cigarettes or kreteks, pipe, and cigars. He has been smoking about 0.00 packs per day for the past 3.00 years. He has never used smokeless tobacco.      F/u in several weeks.     Obdulio Ingram MD,   [facility]   Nicko is a 65 year old who presents for the following health issues   Patient is being seen for an TCU follow up.  [facility]         Hospital Follow-up Visit:    Hospital/Nursing Home/IP Rehab Facility: [facility]  Date of Admission: 02/11/2021  Date of Discharge: 02/22/2021  Reason(s) for Admission:   Chronic atrial fibrillation (H) (Primary Dx);   Long term current use of anticoagulant therapy;   Wound of left lower extremity, subsequent encounter;   Renal insufficiency;   OAB (overactive bladder);   Depressive disorder           Was your hospitalization related to COVID-19? No   Problems taking medications regularly:  None  Medication changes since  discharge: acetaminophen (TYLENOL) 500 MG tablet Take 1,000 mg by mouth 3 (three) times a day. And 1000mg daily PRN, max 4gm/day     azelastine (ASTELIN) 137 mcg (0.1 %) nasal spray 2 sprays into each nostril 2 (two) times a day as needed.     cetirizine (ZYRTEC) 10 MG tablet Take 10 mg by mouth daily as needed.     donepeziL (ARICEPT) 10 MG tablet Take 20 mg by mouth at bedtime.     folic acid (FOLVITE) 1 MG tablet Take 5 mg by mouth daily.     guanFACINE (TENEX) 1 MG tablet Take 1 mg by mouth at bedtime.     lamoTRIgine (LAMICTAL) 25 MG tablet Take 25 mg by mouth daily.     levothyroxine (SYNTHROID, LEVOTHROID) 150 MCG tablet Take 150 mcg by mouth daily.     liothyronine (CYTOMEL) 25 MCG tablet Take 25 mcg by mouth daily.     melatonin 3 mg Tab tablet Take 3 mg by mouth at bedtime as needed.     memantine (NAMENDA) 10 MG tablet Take 10 mg by mouth 2 (two) times a day.     mirabegron (MYRBETRIQ) 50 mg Tb24 ER tablet Take 50 mg by mouth daily.     pregabalin (LYRICA) 100 MG capsule Take 1 capsule (100 mg total) by mouth 3 (three) times a day.     propafenone (RYTHMOL) 150 MG tablet Take 150 mg by mouth every 8 (eight) hours as needed.     senna-docusate (SENNOSIDES-DOCUSATE SODIUM) 8.6-50 mg tablet Take 1 tablet by mouth 2 (two) times a day as needed for constipation.     silver sulfADIAZINE (SILVADENE, SSD) 1 % cream Apply 1 application topically 2 (two) times a day.     simvastatin (ZOCOR) 40 MG tablet Take 40 mg by mouth daily.     venlafaxine (EFFEXOR-XR) 150 MG 24 hr capsule Take 300 mg by mouth daily.     warfarin sodium (WARFARIN ORAL) Take by mouth. 2/19/21 INR 2.81 Cont 7.5mg Sun and Thurs and 5mg AOD. Next INR 2/24/21 with home care RN.   2/15/21 INR 2.65 Take 7.5mg Sun and Thurs and 5mg AOD. Next INR 2/19/21.   2/12/21 INR 1.97 Give 6mg tonight & tomorrow, 7.5mg on Sun. Continue Lovenox. Next INR 2/15.  2/9/21 INR 1.71 Warfarin 7.5mg Sun and Thurs and 5mg AOD. Cont Lovenox 135mg Q 12 hours. Next INR  2/12/21.      Problems adhering to non-medication therapy:  None    Summary of hospitalization:  [facility] discharge summary reviewed  Diagnostic Tests/Treatments reviewed.  Follow up needed: Neurology  Other Healthcare Providers Involved in Patient s Care:         Homecare  Update since discharge: improved.       Post Discharge Medication Reconciliation: discharge medications reconciled and changed, per note/orders.  Plan of care communicated with patient          Reviewed recent ED and hospital visits.   He has been hospitalized 1/9-1/13 with left leg wound requiring I&D.  He was readmitted 1/22 with generalized weakness, confusion and hypotension, and workup included CT showing a right occipital infarct, MRI showing an acute right frontal vertex infarct and multiple chronic infarcts in the right occipital lobe and bilateral cerebellar hemispheres. Neurology was consulted and started Lamotrigine and advised outpatient f/u in about six weeks.   He was transferred to [facility] on 1/22, and was discharged to home on 2/7/2021.     He would like to arrange for closer outpatient neurology f/u in [place], and a referral to [facility] is offered.   He reports that his left calf wound has improved significantly. Wound Care RN consultation has been initiated.     He has taken Valium for some time for muscle spasms. He has not taken this for about 1-1/2 to 2 weeks.   He is advised that I would prefer to reduce his use of Valium for this indication, but I did provide him with a refill on 3/10/2021.    He continues to follow with Queta Pena with [facility] in [place] for mental health issues.   He is not feeling as depressed as he was several weeks ago. He had been hospitalized 11/27-12/4/2021 after an intentional overdose ingestion of warfarin and diazepam.     Past medical, family and social histories as well as medications reviewed and updated as needed.    Review of Systems   REVIEW OF  SYSTEMS: The following systems have been completely reviewed and are negative except as noted above:   Constitutional, respiratory, cardiovascular, gastrointestinal, musculoskeletal, dermatologic, hematologic, psychiatric, and neurologic systems.        Objective           Vitals:  No vitals were obtained today due to virtual visit.    Physical Exam   GENERAL: Healthy, alert and no distress  EYES: Eyes grossly normal to inspection.  No discharge or erythema, or obvious scleral/conjunctival abnormalities.  RESP: No audible wheeze, cough, or visible cyanosis.  No visible retractions or increased work of breathing.    SKIN: Visible skin clear. No significant rash, abnormal pigmentation or lesions.  NEURO: Cranial nerves grossly intact.  Mentation and speech appropriate for age.  PSYCH: Mentation appears normal, affect normal/bright, judgement and insight intact, normal speech and appearance well-groomed.          Video-Visit Details    Type of service:  Video Visit    Video End Time:1655    Originating Location (pt. Location): Home    Distant Location (provider location):  [facility]     Platform used for Video Visit: Hipcricket

## 2021-03-14 ENCOUNTER — TELEPHONE (OUTPATIENT)
Dept: CARE COORDINATION | Facility: CLINIC | Age: 66
End: 2021-03-14

## 2021-03-14 NOTE — TELEPHONE ENCOUNTER
Hi Dr. Ingram,    I did get pt admitted to [facility].  I received verbal orders yesterday from Dr. Martínez for SN 3w3, 1 PRN, PT consult [id number], INR and wound care orders as noted below in my summary to MD.    There are just a few medication interactions that require doctor OKAY to resolve from our med list, is it okay that pt is taking all of these meds at once despite this warning?  . Duplicate therapies of Levothyroxine/Liothyronine AND Lamotrigine/diazepam/lyrica.  . These drug interactions: Donepezil and Myrbetriq taken with Propafenone can cause cardiac arrythmias.  AND Effexor can increase risk for bleeding when taken with warfarin.    Thanks!  Magui Lyon RN  [facility]  [phone]    Here is the MD summary:    SUMMARY TO MD SOC 3/13/2021    SITUATION... Nicko is 65 year old with complex medical history refered by clinic for wound care to left lower leg/WOCN consult.  Pt fell in December and sustained a hematoma to left, medial lower leg that later required I&D, then healing complicated by infection and a skin graft required. He was hospitalized 1/9-1/13/21 for the left leg wound then re hospitalized 1/22-2/7/21 for weakness/confusion/hypotention/CVA/seizure, he had post hospital TCU stay with discharte 2/22/21.  Today the wound is mostly healed, there are three small open areas in a 15.7x2.2 cm area, two cloth stitches are noted and upper area has a buldging semi-soft nodule.  scattered bruising to surrounding skin.  open areas are slough and clean non granulating tissue. no s/s infection.   Pt has trace edema to left leg and pedal pulse is weak to the left foot. Pt reports chronic numbness / pain on the right foot and tingling to the left toes, both feet have good movement and feeling and are warm.  Pt has skin graft site on left thigh that is scabbed/scar tissue. Pt reports that TCU discharge wound care instructions were to leave graft site MERCEDES and to cleanse  then use xeroform and cover with gauze, at this time I feel this is appropriate, WOCN consult will verify wound care early next week.  Pt is unsteady on his feet, walking without a walker at the visit, bent over and reaches out for walls.  house is also cluttered with some uneven josey, pt is high fall risk. Pt requires set up/reminders/limited assistance for ADLs. walking inside the house is tiring to him, he becomes quickly fatigued, SOB and has increase in his chronic Low back pain.  Pt states that Low back pain is always around a 4/10 and with his PT exercises pain goes up to 8/10, he manages the pain with Flexeril, Lyrica, Tylenol Arthritis.  Pt has some confusion, today he is alert and oriented and reports difficulty with short term memory, wife reports that he needs supervision for cognitive impairment daily, but does well with routine tasks.  Pt is taking Donepezil, guanfacine and memantine to help his memory.  Pt reports frequent urination but that the Myrbetriq really helps with that.  Pt was started on Lamotrigine for seizure s during second hospital stay, he has follow up with neurology to review neuro sx scheduled. Pt uses coumadin as blood thinner for hx a-fib and valve replacement, INR on 3/10/21 was therapeutic at 3.4, next due 3/17/21.  Pt's son in law manages medications, this is a new changes since he came home because he was not taking medications as prescribes, he reports he was forgetting to take certain meds.     BACKGROUND past medical history or A fib, hypothyroidism, depression, dementia, alcohol dependence (remission 2018), Anemia, aortic valve prosthesis, BPH, bilateral thoracic back pain (chronic), depression with suicide attempt by multiple drug overdose (11/2020), elevated PSA with family hx prostate cancer, generalized muscle weakness, s/p dissecting thoracic aneurysm repair (2013), hyperlipidemia, hypotension, Left leg wound, lumbar radiculopathy, morbid obesity, overactive  bladder, radiculitis, renal insufficiency, rotator cuff repair, s/p lumbar fusion, spinal stenosis of lumbar region, Tourette syndrome, s/p AMANDA, elbow surgery.    ANALYSIS ... Pt with complex medical history and polypharmacy, fall risk, impaired cognition needing WOCN consult for healing of his wound to left lower leg and PT to help increase strength and endurance.     RECOMMENDATION ... SN for assessment and eeducation r/t wound care/wound healing, INRs, medication education, fall prevention education and home safety, assess/educate on s/s infection.  PT to help pt regain strength and balanc.

## 2021-03-17 ENCOUNTER — ANTICOAGULATION THERAPY VISIT (OUTPATIENT)
Dept: INTERNAL MEDICINE | Facility: CLINIC | Age: 66
End: 2021-03-17

## 2021-03-17 DIAGNOSIS — I48.91 ATRIAL FIBRILLATION (H): ICD-10-CM

## 2021-03-17 DIAGNOSIS — Z79.01 LONG TERM CURRENT USE OF ANTICOAGULANT THERAPY: ICD-10-CM

## 2021-03-17 DIAGNOSIS — Z95.2 AORTIC VALVE PROSTHESIS PRESENT: ICD-10-CM

## 2021-03-17 DIAGNOSIS — I48.20 CHRONIC ATRIAL FIBRILLATION (H): ICD-10-CM

## 2021-03-17 LAB — INR PPP: 3.6 (ref 0.9–1.1)

## 2021-03-17 NOTE — PROGRESS NOTES
ANTICOAGULATION MANAGEMENT     Patient Name:  Todd S Aschoff  Date:  3/17/2021    ASSESSMENT /SUBJECTIVE:    Today's INR result of 3.6 is supratherapeutic. Goal INR of 2.5-3.5      Warfarin dose taken: Warfarin taken as instructed    Diet: No new diet changes affecting INR . One premier protein daily.    Medication changes/ interactions: No new medications/supplements affecting INR    Previous INR: Therapeutic     S/S of bleeding or thromboembolism: No    New injury or illness: No    Upcoming surgery, procedure or cardioversion: No    Additional findings: None. No factors to explain elevated INR      PLAN:    Telephone call with home care nurse Vanessa regarding INR result and instructed:     Warfarin Dosing Instructions: Change your warfarin dose to 2.5mg every Wed, Sat; 5mg all other days  . (7.7 % change)    Instructed patient to follow up no later than: 2 weeks  Orders given to  Homecare nurse/facility to recheck    Education provided: Target INR goal and significance of current INR result      Vanessa verbalizes understanding and agrees to warfarin dosing plan.    Instructed to call the Anticoagulation Clinic for any changes, questions or concerns. (#[phone])        Oscar Briscoe RN      OBJECTIVE:  Recent labs: (last 7 days)     03/17/21   INR 3.6*         No question data found.  Anticoagulation Summary  As of 3/17/2021    INR goal:  2.5-3.5   TTR:  53.4 % (10.5 mo)   INR used for dosing:  3.6 (3/17/2021)   Warfarin maintenance plan:  2.5 mg (5 mg x 0.5) every Wed, Sat; 5 mg (5 mg x 1) all other days   Full warfarin instructions:  2.5 mg every Wed, Sat; 5 mg all other days   Weekly warfarin total:  30 mg   Plan last modified:  Oscar Briscoe, SHYANN (3/17/2021)   Next INR check:  3/31/2021   Priority:  High   Target end date:  Indefinite    Indications    Aortic valve prosthesis present [Z95.2]  Atrial fibrillation (H) [I48.91]  Long term current use of anticoagulant therapy [Z79.01]  Chronic atrial  fibrillation (H) [I48.20]             Anticoagulation Episode Summary     INR check location:      Preferred lab:      Send INR reminders to:  AIDA GUERRERO    Comments:  5mg tabs / CALENDAR / needs bridging. may leave DVM or speak with Paul per signed consent      Anticoagulation Care Providers     Provider Role Specialty Phone number    Obdulio Ingram MD Referring Internal Medicine [phone]

## 2021-03-18 ENCOUNTER — TELEPHONE (OUTPATIENT)
Dept: INTERNAL MEDICINE | Facility: CLINIC | Age: 66
End: 2021-03-18

## 2021-03-18 NOTE — TELEPHONE ENCOUNTER
IKER Gonzalez RN with [facility] calls. She noticed patient still has some suture material present in 1 or 2 areas of his graft and wound site. She is requesting verbal order to remove this at the next home visit.

## 2021-03-18 NOTE — TELEPHONE ENCOUNTER
[facility] now requests orders and shares plan of care/discharge summaries for some patients through [employer].  Please REPLY TO THIS MESSAGE OR ROUTE BACK TO THE AUTHOR in order to give authorization for orders when needed.  This is considered a verbal order, you will still receive a faxed copy of orders for signature.  Thank you for your assistance in improving collaboration for our patients.    ORDER:  PT 2w1, 1w2 in order to work on LE strength, balance, gait/stairs and endurance.    Thanks,  Purnima Whitten, PT  [email or web address]  [phone]

## 2021-03-18 NOTE — TELEPHONE ENCOUNTER
Tape [employer] / kerlix gauze / sponge versalon order received via fax. Form in your mailbox to be signed.

## 2021-03-19 ENCOUNTER — TELEPHONE (OUTPATIENT)
Dept: INTERNAL MEDICINE | Facility: CLINIC | Age: 66
End: 2021-03-19

## 2021-03-22 ENCOUNTER — TELEPHONE (OUTPATIENT)
Dept: INTERNAL MEDICINE | Facility: CLINIC | Age: 66
End: 2021-03-22

## 2021-03-23 ENCOUNTER — TELEPHONE (OUTPATIENT)
Dept: INTERNAL MEDICINE | Facility: CLINIC | Age: 66
End: 2021-03-23

## 2021-03-24 DIAGNOSIS — Z53.9 DIAGNOSIS NOT YET DEFINED: Primary | ICD-10-CM

## 2021-03-24 PROCEDURE — G0180 MD CERTIFICATION HHA PATIENT: HCPCS | Performed by: INTERNAL MEDICINE

## 2021-03-28 DIAGNOSIS — Z79.899 CONTROLLED SUBSTANCE AGREEMENT SIGNED: ICD-10-CM

## 2021-03-28 DIAGNOSIS — M62.830 BACK MUSCLE SPASM: ICD-10-CM

## 2021-03-29 DIAGNOSIS — M54.50 CHRONIC BILATERAL LOW BACK PAIN WITHOUT SCIATICA: ICD-10-CM

## 2021-03-29 DIAGNOSIS — G89.29 CHRONIC BILATERAL LOW BACK PAIN WITHOUT SCIATICA: ICD-10-CM

## 2021-03-29 NOTE — TELEPHONE ENCOUNTER
Controlled Substance Refill Request for Diazepam 5 mg  Problem List Complete:  No     PROVIDER TO CONSIDER COMPLETION OF PROBLEM LIST AND OVERVIEW/CONTROLLED SUBSTANCE AGREEMENT    Last Written Prescription Date:  3/10/21  Last Fill Quantity: 60,   # refills: 0    Last Office Visit with [facility] primary care provider: 3/12/21    Future Office visit:     Controlled substance agreement:   Encounter-Level CSA - 04/28/2017:    Controlled Substance Agreement - Scan on 5/2/2017  4:41 PM: CONTROLLED SUBSTANCE AGREEMENT     Patient-Level CSA:    Controlled Substance Agreement - Non - Opioid - Scan on 11/6/2019  8:10 AM: NON-OPIOID CONTROLLED SUBSTANCE AGREEMENT         Last Urine Drug Screen: No results found for: CDAUT, No results found for: COMDAT, No results found for: THC13, PCP13, COC13, MAMP13, OPI13, AMP13, BZO13, TCA13, MTD13, BAR13, OXY13, PPX13, BUP13     Processing:  Rx to be electronically transmitted to pharmacy by provider      [email or web address]      [place] reviewed:  reviewed- no concerns

## 2021-03-30 RX ORDER — DIAZEPAM 5 MG
TABLET ORAL
Qty: 60 TABLET | Refills: 0 | OUTPATIENT
Start: 2021-03-30

## 2021-03-30 NOTE — TELEPHONE ENCOUNTER
Called and spoke to patient's wife Lissy, on consent to communicate. She checked with her son who sets up Nicko's medication, he thinks that the prescription must be on auto-refill as he did not call in a refill request and patient does not need this. Lissy advised to call pharmacy for refill when ready or can use [employer] to request refill. She will do this and cancel the auto-refill at [facility]. Request denied.

## 2021-03-30 NOTE — TELEPHONE ENCOUNTER
Would like to try to limit this to #60 tabs per month.   Next refill of #60 should cover the month of April.  Please advise pt.

## 2021-03-31 ENCOUNTER — ANTICOAGULATION THERAPY VISIT (OUTPATIENT)
Dept: INTERNAL MEDICINE | Facility: CLINIC | Age: 66
End: 2021-03-31

## 2021-03-31 DIAGNOSIS — Z95.2 AORTIC VALVE PROSTHESIS PRESENT: ICD-10-CM

## 2021-03-31 DIAGNOSIS — I48.20 CHRONIC ATRIAL FIBRILLATION (H): ICD-10-CM

## 2021-03-31 DIAGNOSIS — I48.91 ATRIAL FIBRILLATION (H): ICD-10-CM

## 2021-03-31 DIAGNOSIS — Z79.01 LONG TERM CURRENT USE OF ANTICOAGULANT THERAPY: ICD-10-CM

## 2021-03-31 LAB — INR PPP: 2.7 (ref 0.9–1.1)

## 2021-03-31 NOTE — PROGRESS NOTES
ANTICOAGULATION MANAGEMENT     Patient Name:  Todd S Aschoff  Date:  3/31/2021    ASSESSMENT /SUBJECTIVE:    Today's INR result of 2.7 is therapeutic. Goal INR of 2.5-3.5      Warfarin dose taken: Warfarin taken as instructed    Diet: No new diet changes affecting INR    Medication changes/ interactions: No new medications/supplements affecting INR    Previous INR: Supratherapeutic     S/S of bleeding or thromboembolism: No    New injury or illness: No    Upcoming surgery, procedure or cardioversion: No    Additional findings: None      PLAN:    Telephone call with home care nurse Romina regarding INR result and instructed:     Warfarin Dosing Instructions: Continue your current warfarin dose 2.5mg Wed/Sat & 5mg AOD    Instructed patient to follow up no later than: 2 weeks  Orders given to  Homecare nurse/facility to recheck    Education provided: Contact [facility] Anticoagulation: [phone]  with any changes, questions or concerns.       Romina BOOTHE verbalizes understanding and agrees to warfarin dosing plan.    Instructed to call the Anticoagulation Clinic for any changes, questions or concerns. (#[phone])        Linda Tang RN      OBJECTIVE:  Recent labs: (last 7 days)     21   INR 2.7*         No question data found.  Anticoagulation Summary  As of 3/31/2021    INR goal:  2.5-3.5   TTR:  52.9 % (10.5 mo)   INR used for dosin.7 (3/31/2021)   Warfarin maintenance plan:  2.5 mg (5 mg x 0.5) every Wed, Sat; 5 mg (5 mg x 1) all other days   Full warfarin instructions:  2.5 mg every Wed, Sat; 5 mg all other days   Weekly warfarin total:  30 mg   Plan last modified:  Oscar Briscoe, RN (3/17/2021)   Next INR check:     Priority:  High   Target end date:  Indefinite    Indications    Aortic valve prosthesis present [Z95.2]  Atrial fibrillation (H) [I48.91]  Long term current use of anticoagulant therapy [Z79.01]  Chronic atrial fibrillation (H) [I48.20]             Anticoagulation  Episode Summary     INR check location:      Preferred lab:      Send INR reminders to:  AIDA GUERRERO    Comments:  5mg tabs / CALENDAR / needs bridging. may leave DVM or speak with Paul per signed consent      Anticoagulation Care Providers     Provider Role Specialty Phone number    Obdulio Ingram MD Referring Internal Medicine [phone]         Linda Morales RN, BSN, PHN

## 2021-03-31 NOTE — TELEPHONE ENCOUNTER
Routing refill request to provider for review/approval because:  Drug not on the [facility] refill protocol - last ordered at hospital discharge

## 2021-04-01 DIAGNOSIS — E78.5 HYPERLIPIDEMIA LDL GOAL <130: ICD-10-CM

## 2021-04-01 DIAGNOSIS — F33.1 MAJOR DEPRESSIVE DISORDER, RECURRENT EPISODE, MODERATE (H): ICD-10-CM

## 2021-04-01 RX ORDER — PREGABALIN 100 MG/1
100 CAPSULE ORAL 3 TIMES DAILY
Qty: 270 CAPSULE | Refills: 0 | Status: SHIPPED | OUTPATIENT
Start: 2021-04-01 | End: 2021-08-13

## 2021-04-05 ENCOUNTER — TELEPHONE (OUTPATIENT)
Dept: INTERNAL MEDICINE | Facility: CLINIC | Age: 66
End: 2021-04-05

## 2021-04-05 RX ORDER — SIMVASTATIN 40 MG
TABLET ORAL
Qty: 90 TABLET | Refills: 0 | Status: ON HOLD | OUTPATIENT
Start: 2021-04-05 | End: 2021-04-26

## 2021-04-05 RX ORDER — GUANFACINE 1 MG/1
1 TABLET ORAL AT BEDTIME
Qty: 90 TABLET | Refills: 0 | Status: SHIPPED | OUTPATIENT
Start: 2021-04-05 | End: 2021-11-03

## 2021-04-05 NOTE — TELEPHONE ENCOUNTER
Routing refill request to provider for review/approval because:  Drug not on the [facility] refill protocol     Jacqueline Grimaldo RN

## 2021-04-05 NOTE — TELEPHONE ENCOUNTER
Prescription approved per [facility] Refill Protocol.  Jacqueline Grimaldo RN

## 2021-04-06 ENCOUNTER — TELEPHONE (OUTPATIENT)
Dept: INTERNAL MEDICINE | Facility: CLINIC | Age: 66
End: 2021-04-06

## 2021-04-07 DIAGNOSIS — Z53.9 DIAGNOSIS NOT YET DEFINED: Primary | ICD-10-CM

## 2021-04-07 PROCEDURE — G0180 MD CERTIFICATION HHA PATIENT: HCPCS | Performed by: INTERNAL MEDICINE

## 2021-04-08 ENCOUNTER — TELEPHONE (OUTPATIENT)
Dept: INTERNAL MEDICINE | Facility: CLINIC | Age: 66
End: 2021-04-08

## 2021-04-08 NOTE — TELEPHONE ENCOUNTER
[facility] calling to get verbal orders for:    Skilled Nursing, 2 times x 1 week (starting 4-12-21)    Please call.  OK to LM on VM.

## 2021-04-09 ENCOUNTER — TELEPHONE (OUTPATIENT)
Dept: INTERNAL MEDICINE | Facility: CLINIC | Age: 66
End: 2021-04-09

## 2021-04-09 ENCOUNTER — DOCUMENTATION ONLY (OUTPATIENT)
Dept: INTERNAL MEDICINE | Facility: CLINIC | Age: 66
End: 2021-04-09

## 2021-04-09 DIAGNOSIS — Z95.2 AORTIC VALVE PROSTHESIS PRESENT: ICD-10-CM

## 2021-04-09 DIAGNOSIS — R60.0 BILATERAL LEG EDEMA: Primary | ICD-10-CM

## 2021-04-09 DIAGNOSIS — R60.0 EDEMA LEG: ICD-10-CM

## 2021-04-09 DIAGNOSIS — I48.91 ATRIAL FIBRILLATION (H): Primary | ICD-10-CM

## 2021-04-09 DIAGNOSIS — Z79.01 LONG TERM CURRENT USE OF ANTICOAGULANT THERAPY: ICD-10-CM

## 2021-04-09 NOTE — TELEPHONE ENCOUNTER
Hi Dr. Ingram,    Pt reports intermittent edema in LLE if standing up longer than 45 min. Toes turn red to purple depending on time he is up ambulating. If he elevates, edema resolves. Pt had cap refill less than 3 sec today. Denies pain, no reddness, warmth or swelling noted in RLE.Pedal pulse intact. Educated on dependent edema, however pt wondering if there is any med he can take (diuretic) to assist. Thank you!    Vanessa Jones RN  [phone]

## 2021-04-09 NOTE — TELEPHONE ENCOUNTER
Left voice message for Vanessa with approval for home care orders from Dr. Rocha-covering for Dr. Ingram.

## 2021-04-12 ENCOUNTER — TELEPHONE (OUTPATIENT)
Dept: INTERNAL MEDICINE | Facility: CLINIC | Age: 66
End: 2021-04-12

## 2021-04-12 NOTE — TELEPHONE ENCOUNTER
Fax received from [facility] for review and signature.  Put in Dr. Gage's yellow folder.

## 2021-04-13 ENCOUNTER — TELEPHONE (OUTPATIENT)
Dept: INTERNAL MEDICINE | Facility: CLINIC | Age: 66
End: 2021-04-13

## 2021-04-13 NOTE — TELEPHONE ENCOUNTER
[facility]  Revision to plan of care - OT and PT  Dr Ingram's yellow folder  Fax

## 2021-04-14 ENCOUNTER — ANTICOAGULATION THERAPY VISIT (OUTPATIENT)
Dept: INTERNAL MEDICINE | Facility: CLINIC | Age: 66
End: 2021-04-14

## 2021-04-14 DIAGNOSIS — I48.91 ATRIAL FIBRILLATION (H): ICD-10-CM

## 2021-04-14 DIAGNOSIS — Z79.01 LONG TERM CURRENT USE OF ANTICOAGULANT THERAPY: ICD-10-CM

## 2021-04-14 DIAGNOSIS — Z95.2 AORTIC VALVE PROSTHESIS PRESENT: ICD-10-CM

## 2021-04-14 DIAGNOSIS — I48.20 CHRONIC ATRIAL FIBRILLATION (H): ICD-10-CM

## 2021-04-14 LAB — INR PPP: 1.3 (ref 0.9–1.1)

## 2021-04-14 NOTE — PROGRESS NOTES
"ANTICOAGULATION MANAGEMENT     Patient Name:  Todd S Aschoff  Date:  4/14/2021    ASSESSMENT /SUBJECTIVE:    Today's INR result of 1.3 is subtherapeutic. Goal INR of 2.5-3.5      Warfarin dose taken: Warfarin taken as instructed    Diet: Continues to drink one protein drink/day (not new). Appetite is improving overall    Medication changes/ interactions: No new medications/supplements affecting INR    Previous INR: Therapeutic     S/S of bleeding or thromboembolism: No    New injury or illness: Yes: see home care RN note routed to Dr. Ingram:       \"Pt reports intermittent edema in LLE if standing up longer than 45 min. Toes turn red to purple depending on time he is up ambulating. If he elevates, edema resolves. Pt had cap refill less than 3 sec today. Denies pain, no reddness, warmth or swelling noted in RLE.Pedal pulse intact. Educated on dependent edema, however pt wondering if there is any med he can take (diuretic) to assist. Thank you!\"    Home care RN does not think this looks like a clot and reports that it has been going on for a few weeks (when INRs \"were in the three's\").         Upcoming surgery, procedure or cardioversion: No    Additional findings: Doing physical therapy exercises consistently.      Discussed with [facility]. Will initiate lovenox bridging for subtherapeutic INR.    PLAN:    Telephone call with home care nurse Vanessa regarding INR result and instructed:     Warfarin Dosing Instructions: Take 10mg today then change your warfarin dose to 5mg daily  . (16.7 % change)       Instructed patient to follow up no later than: 04/19  Orders given to  Homecare nurse/facility to recheck    Education provided: Target INR goal and significance of current INR result      Vanessa verbalizes understanding and agrees to warfarin dosing plan.    Instructed to call the Anticoagulation Clinic for any changes, questions or concerns. (#[phone])        Oscar Briscoe RN      OBJECTIVE:  Recent labs: (last 7 " days)     21   INR 1.3*         No question data found.  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 may leave DVM or speak with Paul per signed consent      Anticoagulation Care Providers     Provider Role Specialty Phone number    Obdulio Ingram MD Referring Internal Medicine [phone]

## 2021-04-14 NOTE — PROGRESS NOTES
Chart reviewed with [facility] RN.  CrCl as of 02/20201 SCr >30ml/min,  BMI >40kg/m2    Recommend start Lovenox 0.75mg/kg BID or 105mg BID if weight still ~313lb    Nolvia Andrew, PharmD BCACP  Anticoagulation Clinical Pharmacist

## 2021-04-15 ENCOUNTER — TELEPHONE (OUTPATIENT)
Dept: INTERNAL MEDICINE | Facility: CLINIC | Age: 66
End: 2021-04-15

## 2021-04-15 RX ORDER — FUROSEMIDE 20 MG
20-40 TABLET ORAL DAILY PRN
Qty: 60 TABLET | Refills: 0 | Status: SHIPPED | OUTPATIENT
Start: 2021-04-15 | End: 2021-06-18

## 2021-04-15 RX ORDER — POTASSIUM CHLORIDE 1500 MG/1
20 TABLET, EXTENDED RELEASE ORAL DAILY PRN
Qty: 60 TABLET | Refills: 0 | Status: SHIPPED | OUTPATIENT
Start: 2021-04-15 | End: 2021-06-18

## 2021-04-15 NOTE — TELEPHONE ENCOUNTER
Sent Rx to his pharmacy for furosemide and KCl, 1-2 tablets of each daily as needed (take one KCl tab for each furosemide tab that is taken).   Please advise Vanessa.

## 2021-04-15 NOTE — TELEPHONE ENCOUNTER
Vanessa from [facility] calling looking for orders.  Please advise, thanks.      Skilled nurse visit to continue- 2x/wk for 2 weeks  2 prn skilled nurse visit as well.       Call back  [phone]-ok to betty.

## 2021-04-16 ENCOUNTER — VIRTUAL VISIT (OUTPATIENT)
Dept: NEUROLOGY | Facility: CLINIC | Age: 66
End: 2021-04-16
Payer: COMMERCIAL

## 2021-04-16 ENCOUNTER — MEDICAL CORRESPONDENCE (OUTPATIENT)
Dept: HEALTH INFORMATION MANAGEMENT | Facility: CLINIC | Age: 66
End: 2021-04-16

## 2021-04-16 DIAGNOSIS — I63.411 CEREBROVASCULAR ACCIDENT (CVA) DUE TO EMBOLISM OF RIGHT MIDDLE CEREBRAL ARTERY (H): Primary | ICD-10-CM

## 2021-04-16 PROCEDURE — 99215 OFFICE O/P EST HI 40 MIN: CPT | Mod: 95 | Performed by: NURSE PRACTITIONER

## 2021-04-16 NOTE — Clinical Note
4/16/2021         RE: Todd S Aschoff  [address]  [place] [address]        Dear Colleague,    Thank you for referring your patient, Todd S Aschoff, to the [facility] [place]. Please see a copy of my visit note below.    Nicko is a 65 year old who is being evaluated via a billable telephone visit.      What phone number would you like to be contacted at? [phone]  How would you like to obtain your AVS? MyChart  Phone call duration: 16 minutes  __________________________________________________________      [facility]    at [facility] [phone]  __________________________________________________________    Chief Complaint: Patient presents with:  Stroke: Follow up      History of Present Illness:     Todd S Aschoff is a 65 year old male presenting for follow-up for incidental right frontal stroke. He was hospitalized at [facility] during 1/22-2/7/2021. Prior to the hospital stay, he had a past medical history of aortic dissection status post mechanical valve plus graft, atrial fibrillation (on Coumadin). He presented to the hospital with generalized weakness and confusion. MRI brain revealed an incidental right frontal infarct. His family also provided collateral history of a possible seizure prior to arrival. He was evaluated by general neurology and started on lamotrigine 25 mg daily with plans to increase the dose at 2 week clinic follow-up. Mr. Aschoff has not followed up with N. His hospital stay was complicated by required hematoma evaluation and skin graft of a prior non-healing leg wound.    Stroke Evaluation summarized:  MRI/Head CT: 3 mm right frontal infarct and evidence of numerous chronic appearing infarcts  Intracranial Vascular Imaging: MRA brain with possible diffuse stenosis of the left PCA  Cervical Carotid and Vertebral Artery Vascular Imaging: MRA neck without significant stenosis  Echocardiogram: EF 55-60%, LA  "mildly dilated, negative bubble  EKG/Telemetry: Sinus bradycardia with 1st degree AVB  LDL: 65  A1c: 4.9  Troponin: not tested   Other testing: Not Applicable    He was continued on Coumadin for recurrent stroke prevention. Since being discharged, he reports that his leg is healing. He denies new neurologic deficits.    Impression:   Problem List Items Addressed This Visit     None      Visit Diagnoses     Cerebrovascular accident (CVA) due to embolism of right middle cerebral artery (H)    -  Primary        Incidental right frontal infarct and multiple chronic infarcts in the setting of atrial fibrillation and mechanical aortic valve, on Coumadin, with labile INRs. There was also report of right arm shaking and unresponsiveness, followed by confusion, for which he was evaluated by general neurology and started on lamotrigine.    Plan:   - Continue Coumadin  - LDL 65, continue Simvastatin 40 mg daily. Goal LDL 40-70  - A1c within goal <7.0  - Goal BP <140/90 with tighter control associated with decreased overall CV risk, if tolerated. Reports home SBP in the 120s.  - He has not had further evaluation of possible seizure or had lamotrigine titrated in the outpatient setting. Will have him scheduled with [facility]  - Clinical trial screening: not eligible    Stroke Education provided.  He will call us with any questions.  For any acute neurologic deficits he was advised to  go directly to the hospital rather than call the clinic.    Nancy Flanagan, CNP  Neurology  04/16/2021 3:55 PM  To page me or covering stroke neurology team member, click here: AMCOM  Choose \"On Call\" tab at top, then search dropdown box for \"Neurology Adult\" & press Enter, look for Neuro ICU/Stroke    ___________________________________________________________________    Current Medications  Current Outpatient Medications   Medication Sig     acetaminophen (TYLENOL) 500 MG tablet Take 1-2 tablets (500-1,000 mg) by mouth every 8 hours " as needed for mild pain     azelastine (ASTELIN) 0.1 % nasal spray USE 2 SPRAYS IN NOSTRIL 2 TIMES DAILY AS NEEDED.     cetirizine (ZYRTEC) 10 MG tablet TAKE ONE TABLET BY MOUTH ONCE DAILY AS NEEDED     cyclobenzaprine (FLEXERIL) 10 MG tablet TAKE ONE TABLET BY MOUTH THREE TIMES DAILY AS NEEDED FOR MUSCLE SPASM.     diazepam (VALIUM) 5 MG tablet TAKE ONE TABLET BY MOUTH EVERY EIGHT HOURS AS NEEDED FOR ANXIETY OR MUSCLE SPASM     donepezil (ARICEPT) 10 MG tablet Take 2 tablets (20 mg) by mouth At Bedtime Stop if diarrhea or nausea develop     enoxaparin ANTICOAGULANT (LOVENOX) 120 MG/0.8ML syringe Inject 0.7 mLs (105 mg) Subcutaneous every 12 hours     enoxaparin ANTICOAGULANT (LOVENOX) 150 MG/ML syringe Inject 0.9 mLs (135 mg) Subcutaneous every 12 hours     folic acid (FOLVITE) 1 MG tablet Take 5 tablets (5 mg) by mouth daily     furosemide (LASIX) 20 MG tablet Take 1-2 tablets (20-40 mg) by mouth daily as needed (leg swelling)     guanFACINE (TENEX) 1 MG tablet Take 1 tablet (1 mg) by mouth At Bedtime     lamoTRIgine (LAMICTAL) 25 MG tablet Take 1 tablet (25 mg) by mouth daily     levothyroxine (SYNTHROID/LEVOTHROID) 150 MCG tablet Take 1 tablet (150 mcg) by mouth daily     liothyronine (CYTOMEL) 25 MCG tablet Take 1 tablet (25 mcg) by mouth daily     melatonin 3 MG tablet Take 1 tablet (3 mg) by mouth nightly as needed for sleep     memantine (NAMENDA) 10 MG tablet Take 10 mg by mouth 2 times daily     mirabegron (MYRBETRIQ) 50 MG 24 hr tablet Take 1 tablet (50 mg) by mouth daily     oxyCODONE (ROXICODONE) 5 MG tablet Take 1 tablet (5 mg) by mouth every 4 hours as needed for breakthrough pain     potassium chloride ER (K-TAB) 20 MEQ CR tablet Take 1 tablet (20 mEq) by mouth daily as needed (Take one tablet for each furosemide tablet that is taken)     pregabalin (LYRICA) 100 MG capsule Take 1 capsule (100 mg) by mouth 3 times daily Do not take if sleepy/sedated.     propafenone (RYTHMOL) 150 MG TABS tablet Take 1  tablet (150 mg) by mouth every 8 hours     senna-docusate (SENOKOT-S;PERICOLACE) 8.6-50 MG per tablet Take 1 tablet by mouth 2 times daily as needed for constipation     silver sulfADIAZINE (SILVADENE) 1 % external cream Apply topically 2 times daily     simvastatin (ZOCOR) 40 MG tablet Take 1 tablet (40 mg) by mouth At Bedtime. Need to update cholesterol level before additional refills.     venlafaxine (EFFEXOR-XR) 150 MG 24 hr capsule Take 2 capsules (300 mg) by mouth daily     warfarin ANTICOAGULANT (COUMADIN) 5 MG tablet Take 1.5 tablets (7.5 mg) every Sun, Thurs; 1 tablet (5 mg) all other days or as directed by INR Clinic.     No current facility-administered medications for this visit.        Past Medical History  Past Medical History:   Diagnosis Date     Alcohol dependence (H)      Allergy, unspecified not elsewhere classified     seasonal     Antiplatelet or antithrombotic long-term use     coumadin/lovenox     Aortic valve prosthesis present      Atrial fibrillation (H)      Blood transfusion     after heart surg 1992     BPH (benign prostatic hypertrophy)      Chronic infection     low back wound incision not healing      Chronic pain     lower back and right leg and left leg     Coagulation disorder (H)     on blood thinners     Dissection of aorta, thoracic (H) 1992    [facility] aotic valve + arch graft 1992     Headache(784.0)      Heart murmur     aortic valve replaced and arch     History of spinal cord injury      Low back pain      Major depression      Mixed hyperlipidemia      Numbness and tingling     right leg post surg rightt hip/ also left leg since surg     OA (osteoarthritis)     hips     Obesity, unspecified      Prostate infection      Sciatica 2002    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 ,diabetic also     Prostate Cancer Father      Cancer Mother          age 83 of dementia, also h/o lymphoma     Hypertension Brother         2 brothers with hypertension & sleep apnea     Hypertension Sister         Born ~1936     Sleep Apnea Brother          age 78, Alzheimers     No Known Problems Son      No Known Problems Daughter      No Known Problems Son      Family History Negative Brother         Had 5 brothers, three still alive as of      Colon Cancer No family hx of        Physical Exam    There were no vitals taken for this visit.    General:  no acute distress  HEENT:  normocephalic/atraumatic  Pulmonary:  no respiratory distress    Neurologic  Telephone Visit:  Speech clear and fluent, no obvious dysarthria or word finding difficulty.     Neuroimaging: as per HPI. I personally reviewed those images    Labs:    Coagulation studies:  Recent Labs   Lab Test 21   INR 1.3* 2.7* 3.6*        Lipid panel:  Recent Labs   Lab Test 21  0748 20  1545   CHOL 135 226*   HDL 44 39*   LDL 65 149*   TRIG 130 192*       HbA1C:  Recent Labs   Lab Test 21  0747   A1C 4.9       Troponin:  Recent Labs   Lab Test 21  1105 20  1552   TROPI <0.015 <0.015         Billing disclosure:    60 min spent on the date of the encounter in chart review, patient visit, review of tests, documentation and/or discussion with other providers about the issues documented above.     {Provider  Link to [facility] Help Grid :[id number]}        Again, thank you for allowing me to participate in the care of your patient.        Sincerely,        Nancy Flanagan, CNP

## 2021-04-16 NOTE — TELEPHONE ENCOUNTER
Vanessa calling back to check on status of orders. She is seeing pt on Monday 4/19/21 so please address today

## 2021-04-16 NOTE — PROGRESS NOTES
Nicko is a 65 year old who is being evaluated via a billable telephone visit.      What phone number would you like to be contacted at? [phone]  How would you like to obtain your AVS? Jeniffer  Phone call duration: 16 minutes  __________________________________________________________      [facility]    at [facility] [phone]  __________________________________________________________    Chief Complaint: Patient presents with:  Stroke: Follow up      History of Present Illness:     Todd S Aschoff is a 65 year old male presenting for follow-up for incidental right frontal stroke. He was hospitalized at [facility] during 1/22-2/7/2021. Prior to the hospital stay, he had a past medical history of aortic dissection status post mechanical valve plus graft, atrial fibrillation (on Coumadin). He presented to the hospital with generalized weakness and confusion. MRI brain revealed an incidental right frontal infarct. His family also provided collateral history of a possible seizure prior to arrival. He was evaluated by general neurology and started on lamotrigine 25 mg daily with plans to increase the dose at 2 week clinic follow-up. Mr. Aschoff has not followed up with [facility]. His hospital stay was complicated by required hematoma evaluation and skin graft of a prior non-healing leg wound.    Stroke Evaluation summarized:  MRI/Head CT: 3 mm right frontal infarct and evidence of numerous chronic appearing infarcts  Intracranial Vascular Imaging: MRA brain with possible diffuse stenosis of the left PCA  Cervical Carotid and Vertebral Artery Vascular Imaging: MRA neck without significant stenosis  Echocardiogram: EF 55-60%, LA mildly dilated, negative bubble  EKG/Telemetry: Sinus bradycardia with 1st degree AVB  LDL: 65  A1c: 4.9  Troponin: not tested   Other testing: Not Applicable    He was continued on Coumadin for recurrent stroke prevention. Since being discharged, he reports  "that his leg is healing. He denies new neurologic deficits.    Impression:   Problem List Items Addressed This Visit     None      Visit Diagnoses     Cerebrovascular accident (CVA) due to embolism of right middle cerebral artery (H)    -  Primary        Incidental right frontal infarct and multiple chronic infarcts in the setting of atrial fibrillation and mechanical aortic valve, on Coumadin, with labile INRs. There was also report of right arm shaking and unresponsiveness, followed by confusion, for which he was evaluated by general neurology and started on lamotrigine.    Plan:   - Continue Coumadin  - LDL 65, continue Simvastatin 40 mg daily. Goal LDL 40-70  - A1c within goal <7.0  - Goal BP <140/90 with tighter control associated with decreased overall CV risk, if tolerated. Reports home SBP in the 120s.  - He has not had further evaluation of possible seizure or had lamotrigine titrated in the outpatient setting. Will have him scheduled with [facility]  - Clinical trial screening: not eligible    Stroke Education provided.  He will call us with any questions.  For any acute neurologic deficits he was advised to  go directly to the hospital rather than call the clinic.    Nancy Flanagan, CNP  Neurology  04/16/2021 3:55 PM  To page me or covering stroke neurology team member, click here: [facility]COM  Choose \"On Call\" tab at top, then search dropdown box for \"Neurology Adult\" & press Enter, look for Neuro ICU/Stroke    ___________________________________________________________________    Current Medications  Current Outpatient Medications   Medication Sig     acetaminophen (TYLENOL) 500 MG tablet Take 1-2 tablets (500-1,000 mg) by mouth every 8 hours as needed for mild pain     azelastine (ASTELIN) 0.1 % nasal spray USE 2 SPRAYS IN NOSTRIL 2 TIMES DAILY AS NEEDED.     cetirizine (ZYRTEC) 10 MG tablet TAKE ONE TABLET BY MOUTH ONCE DAILY AS NEEDED     cyclobenzaprine (FLEXERIL) 10 MG tablet TAKE ONE TABLET " BY MOUTH THREE TIMES DAILY AS NEEDED FOR MUSCLE SPASM.     diazepam (VALIUM) 5 MG tablet TAKE ONE TABLET BY MOUTH EVERY EIGHT HOURS AS NEEDED FOR ANXIETY OR MUSCLE SPASM     donepezil (ARICEPT) 10 MG tablet Take 2 tablets (20 mg) by mouth At Bedtime Stop if diarrhea or nausea develop     enoxaparin ANTICOAGULANT (LOVENOX) 120 MG/0.8ML syringe Inject 0.7 mLs (105 mg) Subcutaneous every 12 hours     enoxaparin ANTICOAGULANT (LOVENOX) 150 MG/ML syringe Inject 0.9 mLs (135 mg) Subcutaneous every 12 hours     folic acid (FOLVITE) 1 MG tablet Take 5 tablets (5 mg) by mouth daily     furosemide (LASIX) 20 MG tablet Take 1-2 tablets (20-40 mg) by mouth daily as needed (leg swelling)     guanFACINE (TENEX) 1 MG tablet Take 1 tablet (1 mg) by mouth At Bedtime     lamoTRIgine (LAMICTAL) 25 MG tablet Take 1 tablet (25 mg) by mouth daily     levothyroxine (SYNTHROID/LEVOTHROID) 150 MCG tablet Take 1 tablet (150 mcg) by mouth daily     liothyronine (CYTOMEL) 25 MCG tablet Take 1 tablet (25 mcg) by mouth daily     melatonin 3 MG tablet Take 1 tablet (3 mg) by mouth nightly as needed for sleep     memantine (NAMENDA) 10 MG tablet Take 10 mg by mouth 2 times daily     mirabegron (MYRBETRIQ) 50 MG 24 hr tablet Take 1 tablet (50 mg) by mouth daily     oxyCODONE (ROXICODONE) 5 MG tablet Take 1 tablet (5 mg) by mouth every 4 hours as needed for breakthrough pain     potassium chloride ER (K-TAB) 20 MEQ CR tablet Take 1 tablet (20 mEq) by mouth daily as needed (Take one tablet for each furosemide tablet that is taken)     pregabalin (LYRICA) 100 MG capsule Take 1 capsule (100 mg) by mouth 3 times daily Do not take if sleepy/sedated.     propafenone (RYTHMOL) 150 MG TABS tablet Take 1 tablet (150 mg) by mouth every 8 hours     senna-docusate (SENOKOT-S;PERICOLACE) 8.6-50 MG per tablet Take 1 tablet by mouth 2 times daily as needed for constipation     silver sulfADIAZINE (SILVADENE) 1 % external cream Apply topically 2 times daily      simvastatin (ZOCOR) 40 MG tablet Take 1 tablet (40 mg) by mouth At Bedtime. Need to update cholesterol level before additional refills.     venlafaxine (EFFEXOR-XR) 150 MG 24 hr capsule Take 2 capsules (300 mg) by mouth daily     warfarin ANTICOAGULANT (COUMADIN) 5 MG tablet Take 1.5 tablets (7.5 mg) every Sun, Thurs; 1 tablet (5 mg) all other days or as directed by INR Clinic.     No current facility-administered medications for this visit.        Past Medical History  Past Medical History:   Diagnosis Date     Alcohol dependence (H)      Allergy, unspecified not elsewhere classified     seasonal     Antiplatelet or antithrombotic long-term use     coumadin/lovenox     Aortic valve prosthesis present      Atrial fibrillation (H)      Blood transfusion     after heart surg      BPH (benign prostatic hypertrophy)      Chronic infection     low back wound incision not healing      Chronic pain     lower back and right leg and left leg     Coagulation disorder (H)     on blood thinners     Dissection of aorta, thoracic (H)     [facility] aotic valve + arch graft      Headache(784.0)      Heart murmur     aortic valve replaced and arch     History of spinal cord injury      Low back pain      Major depression      Mixed hyperlipidemia      Numbness and tingling     right leg post surg rightt hip/ also left leg since surg     OA (osteoarthritis)     hips     Obesity, unspecified      Prostate infection      Sciatica     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 ,diabetic also     Prostate Cancer Father      Cancer Mother           age 83 of dementia, also h/o lymphoma     Hypertension Brother         2 brothers with hypertension & sleep apnea     Hypertension Sister         Born ~1936     Sleep Apnea Brother          age 78, Alzheimers     No Known Problems Son      No Known Problems Daughter      No Known Problems Son      Family History Negative Brother         Had 5 brothers, three still alive as of 2019     Colon Cancer No family hx of        Physical Exam    There were no vitals taken for this visit.    General:  no acute distress  HEENT:  normocephalic/atraumatic  Pulmonary:  no respiratory distress    Neurologic  Telephone Visit:  Speech clear and fluent, no obvious dysarthria or word finding difficulty.     Neuroimaging: as per HPI. I personally reviewed those images    Labs:    Coagulation studies:  Recent Labs   Lab Test 21   INR 1.3* 2.7* 3.6*        Lipid panel:  Recent Labs   Lab Test 21  0748 20  1545   CHOL 135 226*   HDL 44 39*   LDL 65 149*   TRIG 130 192*       HbA1C:  Recent Labs   Lab Test 21  0747   A1C 4.9       Troponin:  Recent Labs   Lab Test 21  1105 20  1552   TROPI <0.015 <0.015         Billing disclosure:    60 min spent on the date of the encounter in chart review, patient visit, review of tests, documentation and/or discussion with other providers about the issues documented above.

## 2021-04-16 NOTE — NURSING NOTE
Patient has a question about whether or not he had a seizure or not.  Would like to address that during the appointment.

## 2021-04-18 ENCOUNTER — PRE VISIT (OUTPATIENT)
Dept: NEUROLOGY | Facility: CLINIC | Age: 66
End: 2021-04-18

## 2021-04-18 ENCOUNTER — APPOINTMENT (OUTPATIENT)
Dept: CT IMAGING | Facility: CLINIC | Age: 66
End: 2021-04-18
Attending: EMERGENCY MEDICINE
Payer: COMMERCIAL

## 2021-04-18 ENCOUNTER — APPOINTMENT (OUTPATIENT)
Dept: MRI IMAGING | Facility: CLINIC | Age: 66
End: 2021-04-18
Attending: INTERNAL MEDICINE
Payer: COMMERCIAL

## 2021-04-18 ENCOUNTER — HOSPITAL ENCOUNTER (INPATIENT)
Facility: CLINIC | Age: 66
LOS: 8 days | Discharge: HOME HEALTH | End: 2021-04-26
Attending: EMERGENCY MEDICINE | Admitting: INTERNAL MEDICINE
Payer: COMMERCIAL

## 2021-04-18 ENCOUNTER — APPOINTMENT (OUTPATIENT)
Dept: MRI IMAGING | Facility: CLINIC | Age: 66
End: 2021-04-18
Attending: EMERGENCY MEDICINE
Payer: COMMERCIAL

## 2021-04-18 DIAGNOSIS — E66.01 MORBID OBESITY, UNSPECIFIED OBESITY TYPE (H): ICD-10-CM

## 2021-04-18 DIAGNOSIS — Z79.01 LONG TERM CURRENT USE OF ANTICOAGULANT THERAPY: Primary | ICD-10-CM

## 2021-04-18 DIAGNOSIS — I63.9 CEREBROVASCULAR ACCIDENT (CVA), UNSPECIFIED MECHANISM (H): ICD-10-CM

## 2021-04-18 LAB
ANION GAP SERPL CALCULATED.3IONS-SCNC: 1 MMOL/L (ref 3–14)
APTT PPP: 49 SEC (ref 22–37)
BASOPHILS # BLD AUTO: 0.1 10E9/L (ref 0–0.2)
BASOPHILS NFR BLD AUTO: 0.7 %
BUN SERPL-MCNC: 21 MG/DL (ref 7–30)
CALCIUM SERPL-MCNC: 8.7 MG/DL (ref 8.5–10.1)
CHLORIDE SERPL-SCNC: 105 MMOL/L (ref 94–109)
CHOLEST SERPL-MCNC: 254 MG/DL
CO2 SERPL-SCNC: 35 MMOL/L (ref 20–32)
CREAT SERPL-MCNC: 1.11 MG/DL (ref 0.66–1.25)
DIFFERENTIAL METHOD BLD: NORMAL
EOSINOPHIL # BLD AUTO: 0.1 10E9/L (ref 0–0.7)
EOSINOPHIL NFR BLD AUTO: 1.8 %
ERYTHROCYTE [DISTWIDTH] IN BLOOD BY AUTOMATED COUNT: 14.1 % (ref 10–15)
GFR SERPL CREATININE-BSD FRML MDRD: 70 ML/MIN/{1.73_M2}
GLUCOSE SERPL-MCNC: 117 MG/DL (ref 70–99)
HCT VFR BLD AUTO: 45.6 % (ref 40–53)
HDLC SERPL-MCNC: 43 MG/DL
HGB BLD-MCNC: 14.5 G/DL (ref 13.3–17.7)
IMM GRANULOCYTES # BLD: 0 10E9/L (ref 0–0.4)
IMM GRANULOCYTES NFR BLD: 0.3 %
INR PPP: 1.82 (ref 0.86–1.14)
INTERPRETATION ECG - MUSE: NORMAL
LABORATORY COMMENT REPORT: NORMAL
LDLC SERPL CALC-MCNC: 164 MG/DL
LYMPHOCYTES # BLD AUTO: 2.6 10E9/L (ref 0.8–5.3)
LYMPHOCYTES NFR BLD AUTO: 34.7 %
MAGNESIUM SERPL-MCNC: 2.2 MG/DL (ref 1.6–2.3)
MCH RBC QN AUTO: 29 PG (ref 26.5–33)
MCHC RBC AUTO-ENTMCNC: 31.8 G/DL (ref 31.5–36.5)
MCV RBC AUTO: 91 FL (ref 78–100)
MONOCYTES # BLD AUTO: 1.1 10E9/L (ref 0–1.3)
MONOCYTES NFR BLD AUTO: 15 %
NEUTROPHILS # BLD AUTO: 3.6 10E9/L (ref 1.6–8.3)
NEUTROPHILS NFR BLD AUTO: 47.5 %
NONHDLC SERPL-MCNC: 211 MG/DL
NRBC # BLD AUTO: 0 10*3/UL
NRBC BLD AUTO-RTO: 0 /100
PHOSPHATE SERPL-MCNC: 3.3 MG/DL (ref 2.5–4.5)
PLATELET # BLD AUTO: 220 10E9/L (ref 150–450)
POTASSIUM SERPL-SCNC: 3.9 MMOL/L (ref 3.4–5.3)
POTASSIUM SERPL-SCNC: 4.5 MMOL/L (ref 3.4–5.3)
RBC # BLD AUTO: 5 10E12/L (ref 4.4–5.9)
SARS-COV-2 RNA RESP QL NAA+PROBE: NEGATIVE
SODIUM SERPL-SCNC: 141 MMOL/L (ref 133–144)
SPECIMEN SOURCE: NORMAL
TRIGL SERPL-MCNC: 233 MG/DL
TROPONIN I SERPL-MCNC: <0.015 UG/L (ref 0–0.04)
WBC # BLD AUTO: 7.6 10E9/L (ref 4–11)

## 2021-04-18 PROCEDURE — 93005 ELECTROCARDIOGRAM TRACING: CPT

## 2021-04-18 PROCEDURE — 250N000013 HC RX MED GY IP 250 OP 250 PS 637

## 2021-04-18 PROCEDURE — 36415 COLL VENOUS BLD VENIPUNCTURE: CPT | Performed by: INTERNAL MEDICINE

## 2021-04-18 PROCEDURE — 85610 PROTHROMBIN TIME: CPT | Performed by: EMERGENCY MEDICINE

## 2021-04-18 PROCEDURE — 70498 CT ANGIOGRAPHY NECK: CPT

## 2021-04-18 PROCEDURE — 250N000013 HC RX MED GY IP 250 OP 250 PS 637: Performed by: INTERNAL MEDICINE

## 2021-04-18 PROCEDURE — 99223 1ST HOSP IP/OBS HIGH 75: CPT | Mod: AI | Performed by: INTERNAL MEDICINE

## 2021-04-18 PROCEDURE — 83735 ASSAY OF MAGNESIUM: CPT | Performed by: INTERNAL MEDICINE

## 2021-04-18 PROCEDURE — 70553 MRI BRAIN STEM W/O & W/DYE: CPT | Mod: 77

## 2021-04-18 PROCEDURE — A9585 GADOBUTROL INJECTION: HCPCS | Performed by: EMERGENCY MEDICINE

## 2021-04-18 PROCEDURE — 70450 CT HEAD/BRAIN W/O DYE: CPT

## 2021-04-18 PROCEDURE — 85025 COMPLETE CBC W/AUTO DIFF WBC: CPT | Performed by: EMERGENCY MEDICINE

## 2021-04-18 PROCEDURE — 84484 ASSAY OF TROPONIN QUANT: CPT | Performed by: EMERGENCY MEDICINE

## 2021-04-18 PROCEDURE — 84100 ASSAY OF PHOSPHORUS: CPT | Performed by: INTERNAL MEDICINE

## 2021-04-18 PROCEDURE — 255N000002 HC RX 255 OP 636: Performed by: EMERGENCY MEDICINE

## 2021-04-18 PROCEDURE — 250N000011 HC RX IP 250 OP 636: Performed by: EMERGENCY MEDICINE

## 2021-04-18 PROCEDURE — 80061 LIPID PANEL: CPT | Performed by: INTERNAL MEDICINE

## 2021-04-18 PROCEDURE — 120N000001 HC R&B MED SURG/OB

## 2021-04-18 PROCEDURE — 70553 MRI BRAIN STEM W/O & W/DYE: CPT

## 2021-04-18 PROCEDURE — C9803 HOPD COVID-19 SPEC COLLECT: HCPCS

## 2021-04-18 PROCEDURE — A9585 GADOBUTROL INJECTION: HCPCS | Performed by: INTERNAL MEDICINE

## 2021-04-18 PROCEDURE — 255N000002 HC RX 255 OP 636: Performed by: INTERNAL MEDICINE

## 2021-04-18 PROCEDURE — 80048 BASIC METABOLIC PNL TOTAL CA: CPT | Performed by: EMERGENCY MEDICINE

## 2021-04-18 PROCEDURE — 84132 ASSAY OF SERUM POTASSIUM: CPT | Performed by: INTERNAL MEDICINE

## 2021-04-18 PROCEDURE — 85730 THROMBOPLASTIN TIME PARTIAL: CPT | Performed by: EMERGENCY MEDICINE

## 2021-04-18 PROCEDURE — 87635 SARS-COV-2 COVID-19 AMP PRB: CPT | Performed by: EMERGENCY MEDICINE

## 2021-04-18 PROCEDURE — 999N001017 HC STATISTIC GLUCOSE BY METER IP

## 2021-04-18 PROCEDURE — 250N000009 HC RX 250: Performed by: EMERGENCY MEDICINE

## 2021-04-18 PROCEDURE — 99291 CRITICAL CARE FIRST HOUR: CPT | Mod: 25

## 2021-04-18 PROCEDURE — 250N000011 HC RX IP 250 OP 636: Performed by: INTERNAL MEDICINE

## 2021-04-18 RX ORDER — LAMOTRIGINE 25 MG/1
25 TABLET ORAL DAILY
Status: DISCONTINUED | OUTPATIENT
Start: 2021-04-18 | End: 2021-04-26 | Stop reason: HOSPADM

## 2021-04-18 RX ORDER — MIRABEGRON 50 MG/1
50 TABLET, EXTENDED RELEASE ORAL DAILY
Status: DISCONTINUED | OUTPATIENT
Start: 2021-04-18 | End: 2021-04-26 | Stop reason: HOSPADM

## 2021-04-18 RX ORDER — PROPAFENONE HYDROCHLORIDE 150 MG/1
150 TABLET, COATED ORAL EVERY 8 HOURS SCHEDULED
Status: DISCONTINUED | OUTPATIENT
Start: 2021-04-18 | End: 2021-04-26 | Stop reason: HOSPADM

## 2021-04-18 RX ORDER — GADOBUTROL 604.72 MG/ML
14 INJECTION INTRAVENOUS ONCE
Status: COMPLETED | OUTPATIENT
Start: 2021-04-18 | End: 2021-04-18

## 2021-04-18 RX ORDER — NALOXONE HYDROCHLORIDE 0.4 MG/ML
0.4 INJECTION, SOLUTION INTRAMUSCULAR; INTRAVENOUS; SUBCUTANEOUS
Status: DISCONTINUED | OUTPATIENT
Start: 2021-04-18 | End: 2021-04-26 | Stop reason: HOSPADM

## 2021-04-18 RX ORDER — WARFARIN SODIUM 5 MG/1
5 TABLET ORAL ONCE
Status: COMPLETED | OUTPATIENT
Start: 2021-04-18 | End: 2021-04-18

## 2021-04-18 RX ORDER — IOPAMIDOL 755 MG/ML
120 INJECTION, SOLUTION INTRAVASCULAR ONCE
Status: COMPLETED | OUTPATIENT
Start: 2021-04-18 | End: 2021-04-18

## 2021-04-18 RX ORDER — FOLIC ACID 1 MG/1
5 TABLET ORAL DAILY
Status: DISCONTINUED | OUTPATIENT
Start: 2021-04-18 | End: 2021-04-26 | Stop reason: HOSPADM

## 2021-04-18 RX ORDER — POTASSIUM CHLORIDE 1500 MG/1
20 TABLET, EXTENDED RELEASE ORAL DAILY PRN
Status: DISCONTINUED | OUTPATIENT
Start: 2021-04-18 | End: 2021-04-26 | Stop reason: HOSPADM

## 2021-04-18 RX ORDER — OXYCODONE HYDROCHLORIDE 5 MG/1
5 TABLET ORAL EVERY 4 HOURS PRN
Status: DISCONTINUED | OUTPATIENT
Start: 2021-04-18 | End: 2021-04-26 | Stop reason: HOSPADM

## 2021-04-18 RX ORDER — CYCLOBENZAPRINE HCL 5 MG
5 TABLET ORAL EVERY 8 HOURS PRN
Status: DISCONTINUED | OUTPATIENT
Start: 2021-04-18 | End: 2021-04-26 | Stop reason: HOSPADM

## 2021-04-18 RX ORDER — WARFARIN SODIUM 5 MG/1
5 TABLET ORAL DAILY
Status: ON HOLD | COMMUNITY
End: 2021-04-26

## 2021-04-18 RX ORDER — ACETAMINOPHEN 325 MG/1
650 TABLET ORAL EVERY 4 HOURS PRN
Status: DISCONTINUED | OUTPATIENT
Start: 2021-04-18 | End: 2021-04-26 | Stop reason: HOSPADM

## 2021-04-18 RX ORDER — FUROSEMIDE 20 MG
20-40 TABLET ORAL DAILY PRN
Status: DISCONTINUED | OUTPATIENT
Start: 2021-04-18 | End: 2021-04-26 | Stop reason: HOSPADM

## 2021-04-18 RX ORDER — NALOXONE HYDROCHLORIDE 0.4 MG/ML
0.2 INJECTION, SOLUTION INTRAMUSCULAR; INTRAVENOUS; SUBCUTANEOUS
Status: DISCONTINUED | OUTPATIENT
Start: 2021-04-18 | End: 2021-04-26 | Stop reason: HOSPADM

## 2021-04-18 RX ORDER — LEVOTHYROXINE SODIUM 150 UG/1
150 TABLET ORAL DAILY
Status: DISCONTINUED | OUTPATIENT
Start: 2021-04-18 | End: 2021-04-26 | Stop reason: HOSPADM

## 2021-04-18 RX ORDER — PREGABALIN 100 MG/1
100 CAPSULE ORAL 3 TIMES DAILY
Status: DISCONTINUED | OUTPATIENT
Start: 2021-04-18 | End: 2021-04-26 | Stop reason: HOSPADM

## 2021-04-18 RX ORDER — VENLAFAXINE HYDROCHLORIDE 75 MG/1
300 CAPSULE, EXTENDED RELEASE ORAL DAILY
Status: DISCONTINUED | OUTPATIENT
Start: 2021-04-19 | End: 2021-04-26 | Stop reason: HOSPADM

## 2021-04-18 RX ORDER — AZELASTINE 1 MG/ML
1 SPRAY, METERED NASAL 2 TIMES DAILY
Status: DISCONTINUED | OUTPATIENT
Start: 2021-04-18 | End: 2021-04-26 | Stop reason: HOSPADM

## 2021-04-18 RX ORDER — AMOXICILLIN 250 MG
1 CAPSULE ORAL 2 TIMES DAILY PRN
Status: DISCONTINUED | OUTPATIENT
Start: 2021-04-18 | End: 2021-04-26 | Stop reason: HOSPADM

## 2021-04-18 RX ORDER — ACETAMINOPHEN 500 MG
500-1000 TABLET ORAL EVERY 8 HOURS PRN
Status: DISCONTINUED | OUTPATIENT
Start: 2021-04-18 | End: 2021-04-18

## 2021-04-18 RX ORDER — DONEPEZIL HYDROCHLORIDE 10 MG/1
20 TABLET, FILM COATED ORAL AT BEDTIME
Status: DISCONTINUED | OUTPATIENT
Start: 2021-04-18 | End: 2021-04-26 | Stop reason: HOSPADM

## 2021-04-18 RX ORDER — MEMANTINE HYDROCHLORIDE 10 MG/1
10 TABLET ORAL 2 TIMES DAILY
Status: DISCONTINUED | OUTPATIENT
Start: 2021-04-18 | End: 2021-04-26 | Stop reason: HOSPADM

## 2021-04-18 RX ORDER — LIDOCAINE 40 MG/G
CREAM TOPICAL
Status: DISCONTINUED | OUTPATIENT
Start: 2021-04-18 | End: 2021-04-26 | Stop reason: HOSPADM

## 2021-04-18 RX ADMIN — WARFARIN SODIUM 5 MG: 5 TABLET ORAL at 20:58

## 2021-04-18 RX ADMIN — MEMANTINE 10 MG: 10 TABLET ORAL at 20:57

## 2021-04-18 RX ADMIN — PROPAFENONE HYDROCHLORIDE 150 MG: 150 TABLET, FILM COATED ORAL at 20:57

## 2021-04-18 RX ADMIN — PROPAFENONE HYDROCHLORIDE 150 MG: 150 TABLET, FILM COATED ORAL at 22:05

## 2021-04-18 RX ADMIN — ENOXAPARIN SODIUM 105 MG: 120 INJECTION SUBCUTANEOUS at 20:59

## 2021-04-18 RX ADMIN — LAMOTRIGINE 25 MG: 25 TABLET ORAL at 20:58

## 2021-04-18 RX ADMIN — GADOBUTROL 14 ML: 604.72 INJECTION INTRAVENOUS at 15:27

## 2021-04-18 RX ADMIN — SODIUM CHLORIDE 100 ML: 9 INJECTION, SOLUTION INTRAVENOUS at 14:16

## 2021-04-18 RX ADMIN — IOPAMIDOL 70 ML: 755 INJECTION, SOLUTION INTRAVENOUS at 14:14

## 2021-04-18 RX ADMIN — PREGABALIN 100 MG: 100 CAPSULE ORAL at 22:05

## 2021-04-18 RX ADMIN — GADOBUTROL 14 ML: 604.72 INJECTION INTRAVENOUS at 18:59

## 2021-04-18 RX ADMIN — DONEPEZIL HYDROCHLORIDE 20 MG: 10 TABLET ORAL at 22:05

## 2021-04-18 RX ADMIN — LEVOTHYROXINE SODIUM 150 MCG: 150 TABLET ORAL at 20:57

## 2021-04-18 RX ADMIN — FOLIC ACID 5 MG: 1 TABLET ORAL at 20:57

## 2021-04-18 RX ADMIN — MIRABEGRON 50 MG: 50 TABLET, FILM COATED, EXTENDED RELEASE ORAL at 22:06

## 2021-04-18 ASSESSMENT — ACTIVITIES OF DAILY LIVING (ADL): ADLS_ACUITY_SCORE: 17

## 2021-04-18 ASSESSMENT — ENCOUNTER SYMPTOMS
SPEECH DIFFICULTY: 1
WEAKNESS: 1

## 2021-04-18 ASSESSMENT — MIFFLIN-ST. JEOR: SCORE: 2269.78

## 2021-04-18 NOTE — ED TRIAGE NOTES
Patient brought in by EMS from home. Patient reports at 1310 having right hand weakness. Denies arm droop and no facial droop. Patient hospitalized for CVA in February.

## 2021-04-18 NOTE — ED PROVIDER NOTES
History   Chief Complaint:  Right sided weakness    HPI   Todd S Aschoff is a 65 year old male with history of cerebral vascular accident S/P aortic valve replacement 1991 on coumadin. who presents via EMS with right sided weakness. Per EMS report, the patient began having right sided weakness in his hand for the past hour. He called his daughter who came to the patient and called EMS. His daughter thought that the patient's speech seemed altered. EMS notes that the patient has his INR tested frequently and on Wednesday it was 1.3. During this clinic visit, he was switched to Lovenox and increased his Coumadin.     Per chart review, the patient was admitted from the ED at [facility] from 1/22-2/08 where he had an evacuation of a hematoma and split thickness skin graft done on his left lower leg. It was during this visit that an acute CVA was discovered, see imaging below.    Stroke Imaging from 1/22/2021  MRA Head and Neck (Carotids) w/o and w/ contrast  HEAD MRA:   1.  Tapering of the distal aspect of the left posterior cerebral artery may relate to diffuse stenosis. This is unlikely to reflect acute vessel occlusion in this vessel as there was no associated acute infarct in the left PCA vascular territory.     NECK MRA:  1.  Mild diffuse stenosis distal most aspects right vertebral artery. No hemodynamically significant extracranial stenosis.    MRI Brain w/ & w/o contrast:  1.  3 mm focus of restricted diffusion at the right frontal vertex, involving the right middle frontal gyrus region without associated mass effect or hemorrhage consistent with acute/subacute infarct. No mass effect.     2.  Numerous other chronic appearing infarcts, with dominant infarct present in the right occipital lobe associated with laminar necrosis. Additional bilateral cerebellar hemispheric infarct also present.  Result per radiology.    Review of Systems   Unable to perform ROS: Acuity of condition   Neurological: Positive for speech  "difficulty and weakness (right sided).     Allergies:  Gabapentin      Medications:  Venlafaxine   Warfarin  Memantine  Oxycodone  Mirabegron   Guanfacine   Levothyroxine   Pregabalin   Propafenone   Simvastatin   Liothyronine   Diazepam  Donepezil  Melatonin  Cyclobenzaprine   Albuterol  Lovenox     Past Medical History:    Alcohol dependence  Aortic valve prosthesis present  Atrial fibrillation  Blood transfusion  Benign prostatic hypertrophy  Chronic infection  Chronic back pain  Coagulation disorder  Dissection of aorta, thoracic   Heart murmur  History of spinal cord injury  Hypothyroidism   Low back pain   Major depression          Hyperlipidemia              Numbness and tingling             Osteoarthritis  Obesity  Prostate infection          Sciatica             Unspecified hypothyroidism      Hematoma  Suicide attempt by drug overdose      Past Surgical History:    Aortic valve replacement   Hip arthroplasty   Cataract IOL  Cholecystectomy   Colonoscopy   Lumbar decompression   Cervical fusion   Tonsillectomy   Back cyst removal     Family History:    Cerebrovascular disease (father)  Prostate cancer (father)  Cancer (mother)   Hypertension (brother, sister)  Sleep apnea (brother)     Social History:  The patient lives at home with family.  The patient arrives via EMS.  Physical Exam     Patient Vitals for the past 24 hrs:   BP Temp Temp src Pulse Resp SpO2 Height Weight   04/18/21 1445 130/89 -- -- 71 14 96 % -- --   04/18/21 1430 (!) 174/109 -- -- 70 10 -- -- --   04/18/21 1425 -- -- -- -- -- -- 1.873 m (6' 1.75\") 141.4 kg (311 lb 11.7 oz)   04/18/21 1420 (!) 152/83 -- -- -- -- -- -- --   04/18/21 1414 (!) 158/88 98  F (36.7  C) Temporal 70 16 99 % -- --       Physical Exam  Constitutional: Heavy set white male, supine. No respiratory distress.  HENT: No signs of trauma.   Eyes: Right eye lateral rectus palsy. Pupils are equal, round, and reactive to light.   Neck: Normal range of motion. No JVD present. " No cervical adenopathy. No carotid bruit.  Cardiovascular: Regular rhythm.  Exam reveals no gallop and no friction rub.    2/6 systolic murmur. RUSB. Mechanical valve sounds RUSB. Chest sternotomy incision.  Pulmonary/Chest: Bilateral breath sounds normal. No wheezes, rhonchi or rales.  Abdominal: Soft. No tenderness. No rebound or guarding.   Musculoskeletal: No edema. No tenderness. Surgical dressing over left lower leg.   Lymphadenopathy: No lymphadenopathy.   Neurological: GCS 15. Fluent speech. No facial asymmetry. Motor strength is good except weakness of  in right hand, able to pinch only. Negative drift. Sensation  intact to light touch. Alert and oriented to person, place, and time.  Coordination normal.   Skin: Skin is warm and dry. Surgical dressing as noted    Emergency Department Course   ECG (14:27:53):  Rate 70 bpm. CT interval 170. QRS duration 96. QT/QTc 396/427. P-R-T axes 61 -34 68. Normal sinus rhythm. MARILEE. Interpreted at 1430 by Carlo Spear MD.    Imaging:    CTA Head neck w/ contrast:  HEAD CTA:   1.  Normal head CTA.   2.  No significant stenosis.  No aneurysm or vascular malformation.   3.  No significant change.     NECK CTA:   1.  Normal neck CTA  for age.   2.  No significant stenosis as per NASCET criteria.   3.  No significant change.   Result per radiology.      CT-scan Head w/o contrast:  1.  No CT evidence of a mass, hemorrhage or acute stroke.   2.  Chronic infarcts in the right temporal occipital parenchyma and   cerebellar hemispheres.   3.  No change.   Result per radiology.      MRI Brain w/ & w/o contrast:  1. Small acute infarcts scattered throughout the left greater than   right cerebral hemispheres.   2. Volume loss, chronic small vessel ischemic change, and multiple old   infarcts.   Result per radiology.    Laboratory:    CBC: WBC: 7.6, HGB: 14.5, PLT: 220  BMP: Glucose 117, Carbon Dioxide: 35, Anion Gap: 1 , o/w WNL (Creatinine: 1.11)  Troponin (Collected  1409): <0.015  INR: 1.82  PTT: 49    Asymptomatic COVID19 Virus PCR by nasopharyngeal swab Negative    Emergency Department Course:    Reviewed:    I reviewed the patient's nursing notes, vitals, past medical records, Care Everywhere.     Assessments:    1407: I performed an exam of the patient and obtained history, as documented above.    1410: I called Code Stroke.    1412: The patient left for MRI scan.    1500: I rechecked the patient and updated them on findings. They are amenable to admission.       Consults:   1412: I spoke with Stroke Neurologist regarding the patient.    1441: I spoke to Radiology DrFilomena regarding the patient     1457: I spoke with Hospitalist Dr. Tejada regarding the patient. They accept for care.    Disposition:  The patient was admitted to the hospital under the care of Dr. Tejada.       Impression & Plan   Medical Decision Making:  This is a 65 year old male that presents with paramedics as a possible stroke. The patient had been at [facility] a few months ago. He did have some plastic surgery on his leg and at one point was thought to have had a stroke, although not symptomatic. Also thought to have had some seizure activity. He is on chronic anticoagulation for mechanical valve and this week his INR was checked and it was low. He had his Coumadin increased and was started on Lovenox. Today, within the hour, he noticed weakness in his right hand. He called his daughter and paramedics were called and he was brought in. The daughter reported his speech seemed a little off, there may have been some stuttering. Patient denies this and denies any other symptoms, other than he has trouble gripping with his right hand. On my exam, he is able to pinch but does not have a strong . He has no drift and he has a chronic eye condition causing him to have a lateral rectus palsy on the right eye. Code stroke was called. CTA is unremarkable. It does show several old strokes. Patient's EKG shows  sinus rhythm. His labs are good. I have discussed this case with Stroke Neurology, he has been deescalated. He recommended MRI and admission. Did not recommended aspirin or Plavix.     Critical Care time: 35 minutes    Sandyid-19  Todd S Aschoff was evaluated during a global COVID-19 pandemic, which necessitated consideration that the patient might be at risk for infection with the SARS-CoV-2 virus that causes COVID-19.   Applicable protocols for evaluation were followed during the patient's care.   COVID-19 was considered as part of the patient's evaluation. The plan for testing is:  a test was obtained during this visit.      Diagnosis:    ICD-10-CM    1. Cerebrovascular accident (CVA), unspecified mechanism (H)  I63.9 Asymptomatic SARS-CoV-2 COVID-19 Virus (Coronavirus) by PCR       Scribe Disclosure:  Pio DUARTE, am serving as a scribe at 2:09 PM on 4/18/2021 to document services personally performed by Jewell Spear MD based on my observations and the provider's statements to me.         Carlo Spear MD  04/18/21 9637

## 2021-04-18 NOTE — PHARMACY-ADMISSION MEDICATION HISTORY
Pharmacy Medication History  Admission medication history interview status for the 4/18/2021  admission is complete. See EPIC admission navigator for prior to admission medications     Location of Interview: Patient room  Medication history sources: Patient, Patient's family/friend (Son-in-law Garland) and Surescripts    Significant changes made to the medication list:  Removed diazepam, melatonin, oxycodone, Silvadene    In the past week, patient estimated taking medication this percent of the time: greater than 90%    Medication reconciliation completed by provider prior to medication history? Yes    Time spent in this activity: 30 minutes    Prior to Admission medications    Medication Sig Last Dose Taking? Auth Provider   acetaminophen (TYLENOL) 500 MG tablet Take 1-2 tablets (500-1,000 mg) by mouth every 8 hours as needed for mild pain Past Month at Unknown time Yes Aurelio Etienne,    azelastine (ASTELIN) 0.1 % nasal spray USE 2 SPRAYS IN NOSTRIL 2 TIMES DAILY AS NEEDED. Past Month at Unknown time Yes Obdulio Ingram MD   cetirizine (ZYRTEC) 10 MG tablet TAKE ONE TABLET BY MOUTH ONCE DAILY AS NEEDED 4/17/2021 at 1400 Yes Obdulio Ingram MD   cyclobenzaprine (FLEXERIL) 10 MG tablet TAKE ONE TABLET BY MOUTH THREE TIMES DAILY AS NEEDED FOR MUSCLE SPASM. 4/18/2021 at AM Yes Obdulio Ingram MD   donepezil (ARICEPT) 10 MG tablet Take 2 tablets (20 mg) by mouth At Bedtime Stop if diarrhea or nausea develop 4/17/2021 at HS Yes Jody Tanner MD   enoxaparin ANTICOAGULANT (LOVENOX) 120 MG/0.8ML syringe Inject 0.7 mLs (105 mg) Subcutaneous every 12 hours 4/18/2021 at 0800 Yes Obdulio Ingram MD   folic acid (FOLVITE) 1 MG tablet Take 5 tablets (5 mg) by mouth daily 4/17/2021 at 1400 Yes Jody Tanner MD   furosemide (LASIX) 20 MG tablet Take 1-2 tablets (20-40 mg) by mouth daily as needed (leg swelling) 4/17/2021 at pm Yes Obdulio Ingram MD   guanFACINE (TENEX) 1 MG tablet Take 1 tablet (1 mg) by  mouth At Bedtime 4/17/2021 at hs Yes Savi Ken CNP   lamoTRIgine (LAMICTAL) 25 MG tablet Take 1 tablet (25 mg) by mouth daily 4/17/2021 at 1400 Yes Jody Tanner MD   levothyroxine (SYNTHROID/LEVOTHROID) 150 MCG tablet Take 1 tablet (150 mcg) by mouth daily 4/17/2021 at 1400 Yes Obdulio Ingram MD   liothyronine (CYTOMEL) 25 MCG tablet Take 1 tablet (25 mcg) by mouth daily 4/17/2021 at 1400 Yes Jody Tanner MD   memantine (NAMENDA) 10 MG tablet Take 10 mg by mouth 2 times daily 4/18/2021 at AM Yes Unknown, Entered By History   mirabegron (MYRBETRIQ) 50 MG 24 hr tablet Take 1 tablet (50 mg) by mouth daily 4/17/2021 at 1800 Yes Obdulio Ingram MD   potassium chloride ER (K-TAB) 20 MEQ CR tablet Take 1 tablet (20 mEq) by mouth daily as needed (Take one tablet for each furosemide tablet that is taken) 4/17/2021 at pm Yes Obdulio Ingram MD   pregabalin (LYRICA) 100 MG capsule Take 1 capsule (100 mg) by mouth 3 times daily Do not take if sleepy/sedated. 4/18/2021 at AM Yes Savi Ken CNP   propafenone (RYTHMOL) 150 MG TABS tablet Take 1 tablet (150 mg) by mouth every 8 hours 4/18/2021 at AM Yes Obdulio Ingram MD   senna-docusate (SENOKOT-S;PERICOLACE) 8.6-50 MG per tablet Take 1 tablet by mouth 2 times daily as needed for constipation Past Month at Unknown time Yes Obdulio Ingram MD   simvastatin (ZOCOR) 40 MG tablet Take 1 tablet (40 mg) by mouth At Bedtime. Need to update cholesterol level before additional refills. 4/17/2021 at hs Yes Obdulio Ingram MD   venlafaxine (EFFEXOR-XR) 150 MG 24 hr capsule Take 2 capsules (300 mg) by mouth daily 4/18/2021 at 150mg in AM Yes Obdulio Ingram MD   warfarin ANTICOAGULANT (COUMADIN) 5 MG tablet Take 5 mg by mouth daily 10 mg on Monday 4/12/21, then 5 mg daily per INR clinic. 4/17/2021 at pm Yes Unknown, Entered By History       The information provided in this note is only as accurate as the sources available at the time of update(s)

## 2021-04-18 NOTE — PROGRESS NOTES
RECEIVING UNIT ED HANDOFF REVIEW    ED Nurse Handoff Report was reviewed by: Eri Cheng RN on April 18, 2021 at 4:29 PM

## 2021-04-18 NOTE — ED NOTES
Patient has weak, right hand grasp. Patient able to extend both arms equally against gravity. Patient does not have a facial droop or arm drift. Patient has equal leg strength.    Patient's respirations are even and non labored. Patient denies CP or SOB.

## 2021-04-18 NOTE — ED NOTES
"[facility]  ED Nurse Handoff Report    ED Chief complaint: One-sided Weakness      ED Diagnosis:   Final diagnoses:   Cerebrovascular accident (CVA), unspecified mechanism (H)       Code Status: Not addressed by ED MD.    Allergies:   Allergies   Allergen Reactions     Gabapentin      Severe behavioral disturbances       Patient Story:     Patient brought in by EMS from home. Patient reports at 1310 having right hand weakness. Denies arm droop and no facial droop. Patient hospitalized for CVA in February.  Focused Assessment:    Patient in with complaints of right hand weakness. Patient unable to close hand fully. States symptoms started at 1310. Patient on coumadin and Lovenox. Has history of seizures and stroke.    Treatments and/or interventions provided: CT unremarkable, MRI ordered and pending.  Patient's response to treatments and/or interventions: Patient remains stable and states can close his hand a little more that before.    To be done/followed up on inpatient unit:  Continue to monitor/send to MRI if he has not gone while in ED.    Does this patient have any cognitive concerns?: Disoriented to time    Activity level - Baseline/Home:  Independent  Activity Level - Current:   Has not gotten up while in the ED.    Patient's Preferred language: English   Needed?: No    Isolation: Contact   Infection: MRSA  Patient tested for COVID 19 prior to admission: YES  Bariatric?: No    Vital Signs:   Vitals:    04/18/21 1420 04/18/21 1425 04/18/21 1430 04/18/21 1445   BP: (!) 152/83  (!) 174/109 130/89   Pulse:   70 71   Resp:   10 14   Temp:       TempSrc:       SpO2:    96%   Weight:  141.4 kg (311 lb 11.7 oz)     Height:  1.873 m (6' 1.75\")         Cardiac Rhythm:     Was the PSS-3 completed:   Yes  What interventions are required if any?               Family Comments: Not in attendance.  OBS brochure/video discussed/provided to patient/family: No              Name of person given " brochure if not patient: N/A              Relationship to patient: N/A    For the majority of the shift this patient's behavior was Green.   Behavioral interventions performed were Patient remains calm and cooperative.    ED NURSE PHONE NUMBER: [phone]

## 2021-04-18 NOTE — H&P
Admitted:     04/18/2021      HISTORY OF PRESENT ILLNESS:  This is a 65-year-old male with a history of mechanical aortic valve replacement, on chronic anticoagulation with Coumadin, history of atrial fibrillation, hypothyroidism, hyperlipidemia, obesity, and dementia, who was admitted in the hospital recently at [facility] because of acute stroke, complicated with left lower extremity infected hematoma in 01/2021 and discharged on 02/08/2021.  Now comes to the ER with complaint of right upper extremity weakness.      According to the patient, he was unable to sleep last night and was tired today, was lying down on his bed this afternoon, around 1:00-1:30 he noticed weakness in his right upper extremity and some numbness and tingling in the right upper extremity as well.  He was unable to move his right arm and  was poor.  So, he decided to come to the ER.  The patient denies any fever, chills, chest pain, shortness of breath, orthopnea, headache, dizziness, lightheadedness.  No abdominal pain, back pain, dysuria, hematuria, constipation, diarrhea at this time.      The patient recently had his INR checked last Wednesday, which was subtherapeutic 1.3.  Given history of aortic valve replacement and prior stroke, he was started on bridge with Lovenox and he was taking his Lovenox with Coumadin.  He did take the dose of Lovenox this morning.  Initially daughter noticed that his speech was somewhat different as well with right upper extremity weakness.      ASSESSMENT AND PLAN:   1.  Possible acute ischemic stroke possibly secondary to subtherapeutic INR:  This is a 65-year-old male with history of atrial fibrillation, mechanical aortic valve replacement, on chronic anticoagulation with Coumadin, recently had a subtherapeutic INR.  He is on Lovenox as well as Coumadin at this time.  Now presented with right upper extremity weakness and some speech changes.  He had a stroke code called in the ER, he had  a CT scan of the head and the CTA of the head and neck was done.  CT head was negative for any acute stroke, mass or hemorrhage.  Does show chronic infarct in the right temporal occipital parenchyma and cerebral hemisphere.  CT of the head and neck were unremarkable.  At this time, we will admit him to Neurology floor.  Obtain the MRI of the brain with and without contrast, echocardiogram, keep him on telemetry.  PT, OT and speech to evaluate him.  I will keep him on telemetry.  Keep him on Lovenox as well as Coumadin.  Consult pharmacy to dose his Coumadin.  PT with INR 2.5 and 3.5.  His INR is still subtherapeutic, so we will continue with the Lovenox.  Change his simvastatin to Lipitor.  Check his lipid panel.  Repeat the echocardiogram.  Serial troponins.  Neuro checks every 2-4 hours.  Permissive hypertension.   2.  Atrial fibrillation, rate controlled:  He currently is in normal sinus rhythm.  He is on propafenone.  I will continue with that.   3.  Hypothyroidism, on levothyroxine:  Will continue with that.   4.  Hyperlipidemia, on simvastatin:  Will switch it to Lipitor 40 mg daily.  Check his lipid panel in the morning as well.     5.  Status post mechanical aortic valve replacement:  Has been doing well.  Will repeat the echocardiogram.  Keep him on Coumadin.   6.  Supratherapeutic INR:  The patient told me that his target INR is 2.5-3.5.  I do not know the reason exactly why as he has aortic valve replacement.  We will keep him on Lovenox until his INR is therapeutic.  Consult to pharmacy to dose his Coumadin.  Neurology has no objection to continue Lovenox and Coumadin at this time.    7.  Right lateral rectus palsy:  It is chronic, not new.   8.  History of dementia, on Aricept and Namenda.  We will continue with that.   9.  Deep venous thrombosis prophylaxis with Lovenox.      CODE STATUS:  Full code as per the patient wishes.      The case discussed with ER physician and the nursing staff taking care  of the patient.         MATHEW SIERRA MD             D: 2021   T: 2021   MT: JACKY      Name:     ASCHOFF, TODD   MRN:      [id number]        Account:      [id number]   :      1955        Admitted:     2021                   Document: [id number]

## 2021-04-18 NOTE — ED NOTES
Bed: ST01  Expected date:   Expected time:   Means of arrival:   Comments:  1821  66 M Stroke alert/unequal  and slurred speech/lkw 1310  Glucose 130  1405

## 2021-04-18 NOTE — PLAN OF CARE
Date & Time: 4/18 1715-1900  Diagnosis: CVA  Procedures: NA  Orientation/Cognitive: AOx4  VS/O2: VSS, RA   Mobility: SBA + gb   Diet: Regular   Pain Management: Denies   Bowel & Bladder: Continent   Skin: Pale, scattered bruises, L calf sore   Abnormal Labs: Anion 1, , INR 1.82, PTT 49  Tele: SR + BBB   IV Access/Drips/Fluids: L PIV SL   Drains: NA   Tests: MRI - small infarcts L>R, multiple old infarcts, ECHO needed, CT head - chronic R infarcts, CTA head/neck - negative  Consults: Hospitalist, neurology   Discharge Plan: Pending   Other: Neuros intact ex R hand  1-2/5 strength

## 2021-04-18 NOTE — TELEPHONE ENCOUNTER
FUTURE VISIT INFORMATION      FUTURE VISIT INFORMATION:    Date: 4/19/2021    Time: 430pm    [place] [facility] [place]  REFERRAL INFORMATION:    Referring provider:  Self     Referring providers clinic:      Reason for visit/diagnosis  Follow-up     RECORDS REQUESTED FROM:       Clinic name Comments Records Status Imaging Status   Internal Dr. Flanagan-4/16/2021    Dr. Ingram-3/12/2021    CT Head-1/22/2021    MR Brain-1/22/2021    MRA Brain-1/22/2021 Epic PACS

## 2021-04-18 NOTE — CONSULTS
"[facility]    Stroke Telephone Note    I was called by  on 04/18/21 regarding patient Todd S Aschoff. The patient is a 65 year old male with PMH significant for aortic dissection s/p mechanical valve/graft on warfarin, hx of stroke and seizure activty. Patient presents to the emergency department for evaluation of new onset RUE weakness that started an hour prior to presentation. Patients INR this week was subtherapeutic (1.3), so he was started on Lovenox. In the emergency department patient with continued RUE weakness, but no other focal neuro deficits reported. Initial CT/CTA negative for hemorrhage or LVO.      Stroke Code Data (for stroke code without tele)  Stroke code activated 04/18/21   1400   Stroke provider first response    04/18/21   1402  (called back 1407 as patient was reported to be 5 minutes outside of hosptital on inital call)   Last known normal 04/18/21   1200        Time of discovery   (or onset of symptoms) 04/18/21   1300   Head CT read by Stroke Neuro Dr/Provider 04/18/21   1418   Was stroke code de-escalated?   04/18/21 1434          Thrombolytic Treatment   Not given due to DOAC dose within 48 hours or INR > 1.7.    Endovascular Treatment  Not initiated due to absence of proximal vessel occlusion    Impression  65 year old male on Warfarin and Lovenox who presented to emergency department for evaluation of new RUE weakness. Initial CT/CTA unrevealing. MRI brain pending     Recommendations   - Use orderset: \"Ischemic Stroke Routine Admission\" or \"Ischemic Stroke No Thrombolytics/No Thrombectomy ICU Admission\"  -Continue PTA Lovenox/Warfarin  -Permissive HTN; goal SBP < 220 mmHg  -MRI Brain with and without contrast  -Telemetry, EKG  -Bedside Glucose Monitoring  -A1c, Lipid Panel, Troponin x 3  -PT/OT/SLP  -Stroke Education  -Euthermia, Euglycemia    My recommendations are based on the information provided over the phone by Todd S Aschoff's in-person providers. They " "are not intended to replace the clinical judgment of his in-person providers. I was not requested to personally see or examine the patient at this time.    Jimena Lin PA-C   Neurology  To page me or covering stroke neurology team member, click here: [facility]   Choose \"On Call\" tab at top, then search dropdown box for \"Neurology Adult\", select location, press Enter, then look for stroke/neuro ICU/telestroke.         "

## 2021-04-18 NOTE — H&P
[facility]    History and Physical  Hospitalist       Date of Admission:  4/18/2021    Assessment & Plan       This is a 65-year-old male with a history of mechanical aortic valve replacement, on chronic anticoagulation with Coumadin, history of atrial fibrillation, hypothyroidism, hyperlipidemia, obesity, and dementia, who was admitted in the hospital recently at [facility] because of acute stroke, complicated with left lower extremity infected hematoma in 01/2021 and discharged on 02/08/2021.  Now comes to the ER with complaint of right upper extremity weakness.      ASSESSMENT AND PLAN:   1.  Possible acute ischemic stroke possibly secondary to subtherapeutic INR:  This is a 65-year-old male with history of atrial fibrillation, mechanical aortic valve replacement, on chronic anticoagulation with Coumadin, recently had a subtherapeutic INR.  He is on Lovenox as well as Coumadin at this time.  Now presented with right upper extremity weakness and some speech changes.  He had a stroke code called in the ER, he had a CT scan of the head and the CTA of the head and neck was done.  CT head was negative for any acute stroke, mass or hemorrhage.  Does show chronic infarct in the right temporal occipital parenchyma and cerebral hemisphere.  CT of the head and neck were unremarkable.  At this time, we will admit him to Neurology floor.  Obtain the MRI of the brain with and without contrast, echocardiogram, keep him on telemetry.  PT, OT and speech to evaluate him.  I will keep him on telemetry.  Keep him on Lovenox as well as Coumadin.  Consult pharmacy to dose his Coumadin.  PT with INR 2.5 and 3.5.  His INR is still subtherapeutic, so we will continue with the Lovenox.  Change his simvastatin to Lipitor.  Check his lipid panel.  Repeat the echocardiogram.  Serial troponins.  Neuro checks every 2-4 hours.  Permissive hypertension.   2.  Atrial fibrillation, rate controlled:  He  currently is in normal sinus rhythm.  He is on propafenone.  I will continue with that.   3.  Hypothyroidism, on levothyroxine:  Will continue with that.   4.  Hyperlipidemia, on simvastatin:  Will switch it to Lipitor 40 mg daily.  Check his lipid panel in the morning as well.     5.  Status post mechanical aortic valve replacement:  Has been doing well.  Will repeat the echocardiogram.  Keep him on Coumadin.   6.  Supratherapeutic INR:  The patient told me that his target INR is 2.5-3.5.  I do not know the reason exactly why as he has aortic valve replacement.  We will keep him on Lovenox until his INR is therapeutic.  Consult to pharmacy to dose his Coumadin.  Neurology has no objection to continue Lovenox and Coumadin at this time.    7.  Right lateral rectus palsy:  It is chronic, not new.   8.  History of dementia, on Aricept and Namenda.  We will continue with that.   9.  Deep venous thrombosis prophylaxis with Lovenox.      CODE STATUS:  Full code as per the patient wishes.      The case discussed with ER physician and the nursing staff taking care of the patient.         RAYMOND STEEL MD         DVT Prophylaxis: Enoxaparin (Lovenox) SQ  Code Status: Full Code    Disposition: Expected discharge in 2 days once stable     Raymond Steel MD    Primary Care Physician   Obdulio Ingram MD    Chief Complaint   Right upper extremity weakness     History is obtained from the patient    History of Present Illness   Admitted:     04/18/2021      HISTORY OF PRESENT ILLNESS:  This is a 65-year-old male with a history of mechanical aortic valve replacement, on chronic anticoagulation with Coumadin, history of atrial fibrillation, hypothyroidism, hyperlipidemia, obesity, and dementia, who was admitted in the hospital recently at [facility] because of acute stroke, complicated with left lower extremity infected hematoma in 01/2021 and discharged on 02/08/2021.  Now comes to the ER with complaint of right upper  extremity weakness.      According to the patient, he was unable to sleep last night and was tired today, was lying down on his bed this afternoon, around 1:00-1:30 he noticed weakness in his right upper extremity and some numbness and tingling in the right upper extremity as well.  He was unable to move his right arm and  was poor.  So, he decided to come to the ER.  The patient denies any fever, chills, chest pain, shortness of breath, orthopnea, headache, dizziness, lightheadedness.  No abdominal pain, back pain, dysuria, hematuria, constipation, diarrhea at this time.      The patient recently had his INR checked last Wednesday, which was subtherapeutic 1.3.  Given history of aortic valve replacement and prior stroke, he was started on bridge with Lovenox and he was taking his Lovenox with Coumadin.  He did take the dose of Lovenox this morning.  Initially daughter noticed that his speech was somewhat different as well with right upper extremity weakness.     Past Medical History    I have reviewed this patient's medical history and updated it with pertinent information if needed.   Past Medical History:   Diagnosis Date     Alcohol dependence (H)      Allergy, unspecified not elsewhere classified     seasonal     Antiplatelet or antithrombotic long-term use     coumadin/lovenox     Aortic valve prosthesis present      Atrial fibrillation (H)      Blood transfusion     after heart surg 1992     BPH (benign prostatic hypertrophy)      Chronic infection     low back wound incision not healing      Chronic pain     lower back and right leg and left leg     Coagulation disorder (H)     on blood thinners     Dissection of aorta, thoracic (H) 1992    St Jose F aotic valve + arch graft 1992     Headache(784.0)      Heart murmur     aortic valve replaced and arch     History of spinal cord injury      Low back pain      Major depression      Mixed hyperlipidemia      Numbness and tingling     right leg post surg  rightt hip/ also left leg since surg     OA (osteoarthritis)     hips     Obesity, unspecified      Prostate infection      Sciatica 2002    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  Past Surgical History:   Procedure Laterality Date     AORTIC VALVE REPLACEMENT  1992    St. Jose F's valve     APPLY WOUND VAC Left 1/26/2021    Procedure: Placement of negative pressure wound therapy 2,400 squared centimeters  ;  Surgeon: Lazaro Plascencia MD;  Location: [facility] OR     C TOTAL HIP ARTHROPLASTY  2010    Left AMANDA     C TOTAL HIP ARTHROPLASTY  2002    R hip replacement comp's by nerve injury with pain down into R leg, nadya below knee     CATARACT IOL, RT/LT      rt eye only     CHOLECYSTECTOMY, LAPOROSCOPIC  8/10     CLOSE SECONDARY WOUND LOWER EXTREMITY Left 2/3/2021    Procedure:  Split Thickness Skin Graft to Leg of 18 square centimeters  Intermediate Closure of Leg of 8cm   ;  Surgeon: Lazaro Plascencia MD;  Location: [facility] OR     COLONOSCOPY N/A 1/15/2015    Procedure: COLONOSCOPY;  Surgeon: Johnson Kothari MD;  Location: [facility] GI     DECOMPRESSION LUMBAR ONE LEVEL  4/3/2013    Procedure: DECOMPRESSION LUMBAR ONE LEVEL;  Open Decompression L3-4 bilateral;  Surgeon: Travis Coffey MD;  Location: [facility] OR     DECOMPRESSION, FUSION CERVICAL ANTERIOR ONE LEVEL, COMBINED  3/23/2012    Procedure:COMBINED DECOMPRESSION, FUSION CERVICAL ANTERIOR ONE LEVEL; Anterior Cervical Decompression and Fusion C4-6; Surgeon:TRAVIS COFFEY; Location:[facility] OR     EXPLORE SPINE, REMOVE HARDWARE, COMBINED  5/23/2013    Procedure: COMBINED EXPLORE SPINE, REMOVE HARDWARE;  Exploration Lumbar Wound for fluid collection;  Surgeon: Travis Coffey MD;  Location: [facility] OR     FUSION CERVICAL ANTERIOR TWO LEVELS  3/26/2012    Procedure:FUSION CERVICAL ANTERIOR TWO LEVELS; Anterior Cervical  Fusion C4-6, Anterior Cervical  Hematoma Evacuation; Surgeon:TRAVIS COFFEY; Location:[facility] OR     IRRIGATION AND DEBRIDEMENT LOWER EXTREMITY, COMBINED Left 1/10/2021    Procedure: 1.  Irrigation and excisional debridement to muscle left distal medial calf chronic wounds total area measuring 9 cm x 4 cm 2.  Irrigation and excisional debridement to fascia left medial calf chronic hematoma measuring 29 x 6.7 x 4.2 cm 3.  Primary complex wound closure left medial distal calf 9 cm in length;  Surgeon: Rod Kemp MD;  Location: [facility] OR     IRRIGATION AND DEBRIDEMENT LOWER EXTREMITY, COMBINED Left 1/26/2021    Procedure: Evacuation of hematoma debridement of skin, subcutaneous tissue and muscle 2,400 squared centimeters, placement of negative pressure wound therapy 2,400 squared centimeters;  Surgeon: Lazaro Plascencia MD;  Location: [facility] OR     IRRIGATION AND DEBRIDEMENT SPINE, CLOSE WOUND, COMBINED  3/26/2012    Procedure:COMBINED IRRIGATION AND DEBRIDEMENT SPINE, CLOSE WOUND; Surgeon:TRAVIS COFFEY; Location:[facility] OR     removal of cyst of back   2.5week     TONSILLECTOMY       wisdom teeth[       ZZC NONSPECIFIC PROCEDURE  1992    repair of TAA with graft       Prior to Admission Medications   Prior to Admission Medications   Prescriptions Last Dose Informant Patient Reported? Taking?   acetaminophen (TYLENOL) 500 MG tablet   No No   Sig: Take 1-2 tablets (500-1,000 mg) by mouth every 8 hours as needed for mild pain   azelastine (ASTELIN) 0.1 % nasal spray   No No   Sig: USE 2 SPRAYS IN NOSTRIL 2 TIMES DAILY AS NEEDED.   cetirizine (ZYRTEC) 10 MG tablet   No No   Sig: TAKE ONE TABLET BY MOUTH ONCE DAILY AS NEEDED   cyclobenzaprine (FLEXERIL) 10 MG tablet   No No   Sig: TAKE ONE TABLET BY MOUTH THREE TIMES DAILY AS NEEDED FOR MUSCLE SPASM.   diazepam (VALIUM) 5 MG tablet   No No   Sig: TAKE ONE TABLET BY MOUTH EVERY EIGHT HOURS AS NEEDED FOR ANXIETY OR MUSCLE SPASM   donepezil (ARICEPT) 10 MG tablet   No No    Sig: Take 2 tablets (20 mg) by mouth At Bedtime Stop if diarrhea or nausea develop   enoxaparin ANTICOAGULANT (LOVENOX) 120 MG/0.8ML syringe   No No   Sig: Inject 0.7 mLs (105 mg) Subcutaneous every 12 hours   enoxaparin ANTICOAGULANT (LOVENOX) 150 MG/ML syringe   No No   Sig: Inject 0.9 mLs (135 mg) Subcutaneous every 12 hours   folic acid (FOLVITE) 1 MG tablet   No No   Sig: Take 5 tablets (5 mg) by mouth daily   furosemide (LASIX) 20 MG tablet   No No   Sig: Take 1-2 tablets (20-40 mg) by mouth daily as needed (leg swelling)   guanFACINE (TENEX) 1 MG tablet   No No   Sig: Take 1 tablet (1 mg) by mouth At Bedtime   lamoTRIgine (LAMICTAL) 25 MG tablet   No No   Sig: Take 1 tablet (25 mg) by mouth daily   levothyroxine (SYNTHROID/LEVOTHROID) 150 MCG tablet   No No   Sig: Take 1 tablet (150 mcg) by mouth daily   liothyronine (CYTOMEL) 25 MCG tablet   No No   Sig: Take 1 tablet (25 mcg) by mouth daily   melatonin 3 MG tablet   No No   Sig: Take 1 tablet (3 mg) by mouth nightly as needed for sleep   memantine (NAMENDA) 10 MG tablet   Yes No   Sig: Take 10 mg by mouth 2 times daily   mirabegron (MYRBETRIQ) 50 MG 24 hr tablet   No No   Sig: Take 1 tablet (50 mg) by mouth daily   oxyCODONE (ROXICODONE) 5 MG tablet   No No   Sig: Take 1 tablet (5 mg) by mouth every 4 hours as needed for breakthrough pain   potassium chloride ER (K-TAB) 20 MEQ CR tablet   No No   Sig: Take 1 tablet (20 mEq) by mouth daily as needed (Take one tablet for each furosemide tablet that is taken)   pregabalin (LYRICA) 100 MG capsule   No No   Sig: Take 1 capsule (100 mg) by mouth 3 times daily Do not take if sleepy/sedated.   propafenone (RYTHMOL) 150 MG TABS tablet   No No   Sig: Take 1 tablet (150 mg) by mouth every 8 hours   senna-docusate (SENOKOT-S;PERICOLACE) 8.6-50 MG per tablet   No No   Sig: Take 1 tablet by mouth 2 times daily as needed for constipation   silver sulfADIAZINE (SILVADENE) 1 % external cream   No No   Sig: Apply topically  2 times daily   simvastatin (ZOCOR) 40 MG tablet   No No   Sig: Take 1 tablet (40 mg) by mouth At Bedtime. Need to update cholesterol level before additional refills.   venlafaxine (EFFEXOR-XR) 150 MG 24 hr capsule   No No   Sig: Take 2 capsules (300 mg) by mouth daily   warfarin ANTICOAGULANT (COUMADIN) 5 MG tablet   No No   Sig: Take 1.5 tablets (7.5 mg) every Sun, Thurs; 1 tablet (5 mg) all other days or as directed by INR Clinic.      Facility-Administered Medications: None     Allergies   Allergies   Allergen Reactions     Gabapentin      Severe behavioral disturbances       Social History   I have reviewed this patient's social history and updated it with pertinent information if needed. Todd S Aschoff  reports that he has never smoked. He has never used smokeless tobacco. He reports that he does not drink alcohol or use drugs.    Family History   I have reviewed this patient's family history and updated it with pertinent information if needed.   Family History   Problem Relation Age of Onset     Cerebrovascular Disease Father          age 86, had M.I. ,diabetic also     Prostate Cancer Father      Cancer Mother          age 83 of dementia, also h/o lymphoma     Hypertension Brother         2 brothers with hypertension & sleep apnea     Hypertension Sister         Born ~1936     Sleep Apnea Brother          age 78, Alzheimers     No Known Problems Son      No Known Problems Daughter      No Known Problems Son      Family History Negative Brother         Had 5 brothers, three still alive as of 2019     Colon Cancer No family hx of        Review of Systems   CONSTITUTIONAL:  negative  EYES:  negative  HEENT:  negative  RESPIRATORY:  negative  CARDIOVASCULAR:  negative  GASTROINTESTINAL:  negative  GENITOURINARY:  negative  INTEGUMENT/BREAST:  negative  HEMATOLOGIC/LYMPHATIC:  negative  ALLERGIC/IMMUNOLOGIC:  negative  ENDOCRINE:  negative  MUSCULOSKELETAL:  negative  NEUROLOGICAL:  positive for  weakness  BEHAVIOR/PSYCH:  negative    Physical Exam   Temp: 98  F (36.7  C) Temp src: Temporal BP: 130/89 Pulse: 71   Resp: 14 SpO2: 96 % O2 Device: None (Room air)    Vital Signs with Ranges  Temp:  [98  F (36.7  C)] 98  F (36.7  C)  Pulse:  [70-71] 71  Resp:  [10-16] 14  BP: (130-174)/() 130/89  SpO2:  [96 %-99 %] 96 %  311 lbs 11.69 oz    Constitutional: patient fatigued and tired, no apparent distress.  Eyes: Conjunctiva and pupils examined and normal. Has right Lateral rectus palsy   HEENT: Moist mucous membranes, normal dentition.  Respiratory: Clear to auscultation bilaterally, no crackles or wheezing.  Cardiovascular: Regular rate and rhythm, normal S1 and S2, and no murmur noted.  GI: Soft, non-distended, non-tender, normal bowel sounds.  Lymph/Hematologic: No anterior cervical or supraclavicular adenopathy.  Skin: No rashes, no cyanosis, no edema.  Musculoskeletal: No joint swelling, erythema or tenderness.  Neurologic: Cranial nerves intact except for right LR palsy, right upper extremity strength 4//5 all other 5/5.   Psychiatric: Alert, oriented to person, place and time, no obvious anxiety or depression.    Data   Data reviewed today:  I personally reviewed the EKG tracing showing NSR, Q wave in inferior leads, poor R wave progression remain unchanged as compare to previous EKG, no acute ischemic changes.   Recent Labs   Lab 04/18/21  1409 04/14/21   WBC 7.6  --    HGB 14.5  --    MCV 91  --      --    INR 1.82* 1.3*     --    POTASSIUM 3.9  --    CHLORIDE 105  --    CO2 35*  --    BUN 21  --    CR 1.11  --    ANIONGAP 1*  --    CATERINA 8.7  --    *  --    TROPI <0.015  --        Recent Results (from the past 24 hour(s))   CT Head w/o Contrast    Narrative    CT OF THE HEAD WITHOUT CONTRAST   4/18/2021 2:18 PM     COMPARISON: Head MRI 1/22/2001    HISTORY: Slurred speech    TECHNIQUE:  Axial CT images of the head from the skull base to the  vertex were acquired without IV  contrast.    FINDINGS:   INTRACRANIAL CONTENTS: No intracranial hemorrhage, extraaxial  collection, or mass effect.  No CT evidence of acute infarct.    Chronic infarcts in the right temporal occipital parenchyma and  cerebellar hemispheres again noted. Superimposed mild generalized  volume loss.    VISUALIZED ORBITS/SINUSES/MASTOIDS: No significant orbital  abnormality.  No significant paranasal sinus mucosal disease. No  significant middle ear or mastoid effusion.    OSSEOUS STRUCTURES/SOFT TISSUES: No significant abnormality.      Impression    IMPRESSION:  1.  No CT evidence of a mass, hemorrhage or acute stroke.  2.  Chronic infarcts in the right temporal occipital parenchyma and  cerebellar hemispheres.  3.  No change.    Radiation dose for this scan was reduced using automated exposure  control, adjustment of the mA and/or kV according to patient size, or  iterative reconstruction technique    GENET CASEY MD   CTA Head Neck with Contrast    Addendum: 4/18/2021    Findings were discussed with Dr. Spear at 2:41 PM hours on  4/18/2021.    GENET CASEY MD      Narrative    CT ANGIOGRAM OF THE HEAD AND NECK WITH CONTRAST  4/18/2021 2:20 PM     COMPARISON: Head MRA and neck MRA examinations 1/22/2001    HISTORY: Slurred speech. Acute stroke suspected.    TECHNIQUE:    Precontrast localizing scans were followed by CT angiography with an  injection of 70 mL Isovue-370 nonionic intravenous contrast material  with scans through the head and neck.  Images were transferred to a  separate 3-D workstation where multiplanar reformations and 3-D images  were created.  Estimates of carotid stenoses are made relative to the  distal internal carotid artery diameters except as noted.      FINDINGS:   HEAD CTA:  ANTERIOR CIRCULATION: No significant stenosis or occlusion. Standard  [place] anatomy.    POSTERIOR CIRCULATION: No significant stenosis or occlusion. Balanced  vertebral arteries supply a normal basilar  artery.    ANEURYSM/VASCULAR MALFORMATION: None.    NECK CTA:  RIGHT CAROTID: No measurable stenosis in the right ICA based on NASCET  criteria.    LEFT CAROTID: No measurable stenosis in the left ICA based on NASCET  criteria.     VERTEBRAL ARTERIES: Balanced vertebral arteries are patent in the neck  and into the head.     AORTIC ARCH: Classic aortic arch anatomy with no significant stenosis  at the origin of the great vessels.      Impression    IMPRESSION:    HEAD CTA:  1.  Normal head CTA.  2.  No significant stenosis.  No aneurysm or vascular malformation.  3.  No significant change.    NECK CTA:  1.  Normal neck CTA  for age.  2.  No significant stenosis as per NASCET criteria.  3.  No significant change.    Radiation dose for this scan was reduced using automated exposure  control, adjustment of the mA and/or kV according to patient size, or  iterative reconstruction technique    GENET CASEY MD

## 2021-04-19 ENCOUNTER — APPOINTMENT (OUTPATIENT)
Dept: CARDIOLOGY | Facility: CLINIC | Age: 66
End: 2021-04-19
Attending: INTERNAL MEDICINE
Payer: COMMERCIAL

## 2021-04-19 ENCOUNTER — APPOINTMENT (OUTPATIENT)
Dept: OCCUPATIONAL THERAPY | Facility: CLINIC | Age: 66
End: 2021-04-19
Attending: INTERNAL MEDICINE
Payer: COMMERCIAL

## 2021-04-19 ENCOUNTER — APPOINTMENT (OUTPATIENT)
Dept: PHYSICAL THERAPY | Facility: CLINIC | Age: 66
End: 2021-04-19
Attending: INTERNAL MEDICINE
Payer: COMMERCIAL

## 2021-04-19 LAB
ANION GAP SERPL CALCULATED.3IONS-SCNC: 1 MMOL/L (ref 3–14)
BUN SERPL-MCNC: 20 MG/DL (ref 7–30)
CALCIUM SERPL-MCNC: 9 MG/DL (ref 8.5–10.1)
CHLORIDE SERPL-SCNC: 104 MMOL/L (ref 94–109)
CO2 SERPL-SCNC: 33 MMOL/L (ref 20–32)
CREAT SERPL-MCNC: 1.11 MG/DL (ref 0.66–1.25)
ERYTHROCYTE [DISTWIDTH] IN BLOOD BY AUTOMATED COUNT: 14.2 % (ref 10–15)
GFR SERPL CREATININE-BSD FRML MDRD: 70 ML/MIN/{1.73_M2}
GLUCOSE BLDC GLUCOMTR-MCNC: 123 MG/DL (ref 70–99)
GLUCOSE BLDC GLUCOMTR-MCNC: 125 MG/DL (ref 70–99)
GLUCOSE BLDC GLUCOMTR-MCNC: 152 MG/DL (ref 70–99)
GLUCOSE SERPL-MCNC: 101 MG/DL (ref 70–99)
HCT VFR BLD AUTO: 45.5 % (ref 40–53)
HGB BLD-MCNC: 14.9 G/DL (ref 13.3–17.7)
INR PPP: 1.7 (ref 0.86–1.14)
MCH RBC QN AUTO: 29.7 PG (ref 26.5–33)
MCHC RBC AUTO-ENTMCNC: 32.7 G/DL (ref 31.5–36.5)
MCV RBC AUTO: 91 FL (ref 78–100)
PLATELET # BLD AUTO: 226 10E9/L (ref 150–450)
POTASSIUM SERPL-SCNC: 4.2 MMOL/L (ref 3.4–5.3)
RBC # BLD AUTO: 5.02 10E12/L (ref 4.4–5.9)
SODIUM SERPL-SCNC: 138 MMOL/L (ref 133–144)
TROPONIN I SERPL-MCNC: <0.015 UG/L (ref 0–0.04)
WBC # BLD AUTO: 6.7 10E9/L (ref 4–11)

## 2021-04-19 PROCEDURE — 97110 THERAPEUTIC EXERCISES: CPT | Mod: GO | Performed by: OCCUPATIONAL THERAPIST

## 2021-04-19 PROCEDURE — 93321 DOPPLER ECHO F-UP/LMTD STD: CPT | Mod: 26 | Performed by: INTERNAL MEDICINE

## 2021-04-19 PROCEDURE — 85610 PROTHROMBIN TIME: CPT | Performed by: INTERNAL MEDICINE

## 2021-04-19 PROCEDURE — 250N000011 HC RX IP 250 OP 636: Performed by: INTERNAL MEDICINE

## 2021-04-19 PROCEDURE — 93321 DOPPLER ECHO F-UP/LMTD STD: CPT

## 2021-04-19 PROCEDURE — 97110 THERAPEUTIC EXERCISES: CPT | Mod: GP

## 2021-04-19 PROCEDURE — 999N001017 HC STATISTIC GLUCOSE BY METER IP

## 2021-04-19 PROCEDURE — 999N000226 HC STATISTIC SLP IP EVAL DEFER: Performed by: SPEECH-LANGUAGE PATHOLOGIST

## 2021-04-19 PROCEDURE — 255N000002 HC RX 255 OP 636: Performed by: INTERNAL MEDICINE

## 2021-04-19 PROCEDURE — 85027 COMPLETE CBC AUTOMATED: CPT | Performed by: INTERNAL MEDICINE

## 2021-04-19 PROCEDURE — 93325 DOPPLER ECHO COLOR FLOW MAPG: CPT | Mod: 26 | Performed by: INTERNAL MEDICINE

## 2021-04-19 PROCEDURE — 97535 SELF CARE MNGMENT TRAINING: CPT | Mod: GO | Performed by: OCCUPATIONAL THERAPIST

## 2021-04-19 PROCEDURE — 99232 SBSQ HOSP IP/OBS MODERATE 35: CPT | Performed by: PHYSICIAN ASSISTANT

## 2021-04-19 PROCEDURE — 84484 ASSAY OF TROPONIN QUANT: CPT | Performed by: INTERNAL MEDICINE

## 2021-04-19 PROCEDURE — 250N000013 HC RX MED GY IP 250 OP 250 PS 637: Performed by: PHYSICIAN ASSISTANT

## 2021-04-19 PROCEDURE — 97162 PT EVAL MOD COMPLEX 30 MIN: CPT | Mod: GP

## 2021-04-19 PROCEDURE — 97166 OT EVAL MOD COMPLEX 45 MIN: CPT | Mod: GO | Performed by: OCCUPATIONAL THERAPIST

## 2021-04-19 PROCEDURE — 250N000013 HC RX MED GY IP 250 OP 250 PS 637: Performed by: INTERNAL MEDICINE

## 2021-04-19 PROCEDURE — 93308 TTE F-UP OR LMTD: CPT | Mod: 26 | Performed by: INTERNAL MEDICINE

## 2021-04-19 PROCEDURE — 80048 BASIC METABOLIC PNL TOTAL CA: CPT | Performed by: INTERNAL MEDICINE

## 2021-04-19 PROCEDURE — 120N000001 HC R&B MED SURG/OB

## 2021-04-19 PROCEDURE — 36415 COLL VENOUS BLD VENIPUNCTURE: CPT | Performed by: INTERNAL MEDICINE

## 2021-04-19 RX ORDER — ATORVASTATIN CALCIUM 40 MG/1
40 TABLET, FILM COATED ORAL EVERY EVENING
Status: DISCONTINUED | OUTPATIENT
Start: 2021-04-19 | End: 2021-04-26 | Stop reason: HOSPADM

## 2021-04-19 RX ORDER — WARFARIN SODIUM 10 MG/1
10 TABLET ORAL
Status: COMPLETED | OUTPATIENT
Start: 2021-04-19 | End: 2021-04-19

## 2021-04-19 RX ADMIN — HUMAN ALBUMIN MICROSPHERES AND PERFLUTREN 9 ML: 10; .22 INJECTION, SOLUTION INTRAVENOUS at 14:51

## 2021-04-19 RX ADMIN — PROPAFENONE HYDROCHLORIDE 150 MG: 150 TABLET, FILM COATED ORAL at 06:12

## 2021-04-19 RX ADMIN — VENLAFAXINE HYDROCHLORIDE 300 MG: 75 CAPSULE, EXTENDED RELEASE ORAL at 09:57

## 2021-04-19 RX ADMIN — WARFARIN SODIUM 10 MG: 10 TABLET ORAL at 17:16

## 2021-04-19 RX ADMIN — MEMANTINE 10 MG: 10 TABLET ORAL at 09:57

## 2021-04-19 RX ADMIN — LEVOTHYROXINE SODIUM 150 MCG: 150 TABLET ORAL at 14:33

## 2021-04-19 RX ADMIN — ENOXAPARIN SODIUM 105 MG: 120 INJECTION SUBCUTANEOUS at 20:02

## 2021-04-19 RX ADMIN — LAMOTRIGINE 25 MG: 25 TABLET ORAL at 14:33

## 2021-04-19 RX ADMIN — PROPAFENONE HYDROCHLORIDE 150 MG: 150 TABLET, FILM COATED ORAL at 22:55

## 2021-04-19 RX ADMIN — FOLIC ACID 5 MG: 1 TABLET ORAL at 14:33

## 2021-04-19 RX ADMIN — ATORVASTATIN CALCIUM 40 MG: 40 TABLET, FILM COATED ORAL at 20:02

## 2021-04-19 RX ADMIN — DONEPEZIL HYDROCHLORIDE 20 MG: 10 TABLET ORAL at 22:55

## 2021-04-19 RX ADMIN — PROPAFENONE HYDROCHLORIDE 150 MG: 150 TABLET, FILM COATED ORAL at 14:33

## 2021-04-19 RX ADMIN — AZELASTINE HYDROCHLORIDE 1 SPRAY: 137 SPRAY, METERED NASAL at 20:01

## 2021-04-19 RX ADMIN — MEMANTINE 10 MG: 10 TABLET ORAL at 20:02

## 2021-04-19 RX ADMIN — MIRABEGRON 50 MG: 50 TABLET, FILM COATED, EXTENDED RELEASE ORAL at 17:17

## 2021-04-19 RX ADMIN — PREGABALIN 100 MG: 100 CAPSULE ORAL at 17:16

## 2021-04-19 RX ADMIN — ENOXAPARIN SODIUM 105 MG: 120 INJECTION SUBCUTANEOUS at 09:55

## 2021-04-19 RX ADMIN — PREGABALIN 100 MG: 100 CAPSULE ORAL at 22:55

## 2021-04-19 RX ADMIN — PREGABALIN 100 MG: 100 CAPSULE ORAL at 09:57

## 2021-04-19 ASSESSMENT — ACTIVITIES OF DAILY LIVING (ADL)
ADLS_ACUITY_SCORE: 15

## 2021-04-19 NOTE — PROGRESS NOTES
04/19/21 0847   Quick Adds   Quick Adds Vestibular Eval   Type of Visit Initial PT Evaluation   Living Environment   People in home child(morgan), adult;spouse  (ex-wife)   Living Environment Comments Split-entry: 7 ERNESTINA with rail, 7 up/down with rail.    Self-Care   Usual Activity Tolerance moderate   Current Activity Tolerance moderate   Equipment Currently Used at Home cane, straight;walker, rolling   Activity/Exercise/Self-Care Comment No AD lately, spent time in TCUs and home care - just finished with ongoign HEP use (hips and core strengthening, balance).    Disability/Function   Walking or Climbing Stairs Difficulty yes   Fall history within last six months no   Change in Functional Status Since Onset of Current Illness/Injury yes   General Information   Onset of Illness/Injury or Date of Surgery 04/18/21   Referring Physician Raymond Steel MD   Patient/Family Therapy Goals Statement (PT) To self-manage if needed if unable to get back into PT (ex-wife wants hers first).    Pertinent History of Current Problem (include personal factors and/or comorbidities that impact the POC) Admit with hand sensory & strength changes found to have small L > R CVAs, hx CVAs. C-sx went well, L-fusion '18 with SCI per pt report with TCU stay with L > R deficits (feel exacerbated now), recent fall with L lower leg ruptured hematoma with wound care and TCU stay/home care as noted, R hip sx with neuro changes knee down ongoing. Mechanical AVR, Afib, HLD, hypothryoidism, obese, dementia, congenital R lateral rectus palsy. Leg length discrepancy.   Existing Precautions/Restrictions fall   Weight-Bearing Status - LUE weight-bearing as tolerated  (all)   Cognition   Affect/Mental Status (Cognition) WFL   Follows Commands (Cognition) WFL   Pain Assessment   Patient Currently in Pain No   Posture    Posture Comments IMpaired controlled mobility.    Range of Motion (ROM)   ROM Comment Functionally reduced L ankle mildly.    Strength    Strength Comments MMT: hip abd/add 4L 5R, hip flx 5B, knee flx 4+L 5R, knee ext 5B, DF 5B. Functionally, weak power to stand with heavy UE dependence to stand from toilet with increased effort.  symmetral B, finger add symmetrical B, finger ext weaker R than L yet.    Bed Mobility   Comment (Bed Mobility) Vik   Transfers   Transfer Safety Comments Supervision toilet transers with heavy UE dpeendencena nd increased effort to stand. Vik stands from normal and elevated heights. Pivots distant supervision-Vik no AD.    Gait/Stairs (Locomotion)   Distance in Feet (Required for LE Total Joints) Distant supervision-Vik no AD when self-selected gait. SBA-CGA at times during FGA testing.   L hip drop.    Comment (Gait/Stairs) 1 flight 1 rail non-reciprocally distant supervision.    Balance   Balance Comments FGA 15/30 - falls risk indoors and outdoors.    Coordination   Coordination Comments Impaired L with alt toe taps, alt heel taps.     Impaired L > R LE motor control per AP cycling. L hams dyscontrol even with MMT.    Muscle Tone   Muscle Tone Comments WFL on observation.    Oculomotor Exam   VOR Comments L path dev with EC, unsteady with stepping strategies with amb with head turns. Vestibulospinal impaired. Vision impairments. Anticipate VOR also impaired.    Convergence Testing Comments R eye with 180deg peripheral vision at baseline despite impaired abd. Note L eye losing conjugate gaze with intermittent adduction (tropia).    Clinical Impression   Criteria for Skilled Therapeutic Intervention yes, treatment indicated   PT Diagnosis (PT) Impaired mobility   Influenced by the following impairments Impaired strength, balance, coordination, motor control, vision, vestib.   Functional limitations due to impairments Impaired independent mobility & living   Clinical Presentation Evolving/Changing   Clinical Presentation Rationale Complex PMH wtih acute on chronic changes.   Clinical Decision Making (Complexity)  moderate complexity   Therapy Frequency (PT) 2x/day   Predicted Duration of Therapy Intervention (days/wks) 3 days   Planned Therapy Interventions (PT) balance training;gait training;home exercise program;neuromuscular re-education;patient/family education;postural re-education;stair training;strengthening;transfer training;motor coordination training   Anticipated Equipment Needs at Discharge (PT) cane, straight  (outdoors)   Risk & Benefits of therapy have been explained evaluation/treatment results reviewed;care plan/treatment goals reviewed;risks/benefits reviewed;current/potential barriers reviewed;participants voiced agreement with care plan;participants included;patient   PT Discharge Planning    PT Discharge Recommendation (DC Rec) home with assist;home with outpatient physical therapy   PT Rationale for DC Rec Pt would be best served in OP PT, but as has wound nursing needs and doesn't drive may need home PT. Per pt, ex-wife also doesn't want him to get more PT until she gets hers for her ankle fx; home PT would ease both pt & his ex-wife to receive medically necessary services.    PT Brief overview of current status  Bed mob Vik, toilet tx supervision, FGA 15/30 - falls risk but with good safe decision-making demonstrated. Impaired vision, vestib, motor control, coordination.    Total Evaluation Time   Total Evaluation Time (Minutes) 30

## 2021-04-19 NOTE — CONSULTS
04/19/21 1059 Queta Padilla, RN     Stroke Education Note     The following information has been reviewed with the patient:     1. Warning signs of stroke     2. Calling 911 if having warning signs of stroke     3. All modifiable risk factors: hypertension, CAD, atrial fib, diabetes, hypercholesterolemia, smoking, substance abuse, diet, physical inactivity, obesity, sleep apnea.     4. Patient's risk factors for stroke which include: AF, HLD, Diet, physical inactivity, obesity, sleep apnea     5. Follow-up plan for after discharge     6. Discharge medications which include: statin, anti-coaguation  In addition, the [facility] Stroke Class Handout has been given to the patient.     Learner's response to risk factors / lifestyle modification education: Ability to change Reasons to change Need to change      Queta Padilla RN, RN      No education note entered.

## 2021-04-19 NOTE — PLAN OF CARE
Pt here with CVA.  A&Ox4. Neuros intact, states weakness in right hand has resolved.  VSS. Tele NSR. Regular diet, thin liquids. Takes pills whole with water. Independent in room.  Pt has ACE wrap on LLE, receives wound care at home. Denies pain. Pt had echo today.  Pt scoring green on the Aggression Stop Light Tool. Likely will discharge tomorrow once neuro work-up completed.

## 2021-04-19 NOTE — PHARMACY-RX INSURANCE COVERAGE
Lipitor coverage check.  Patient has insurance through [facility] through an employer.    Atorvastatin: $0.    -S. Gilberto, Pharmacy Technician/Liaison, Discharge Pharmacy [phone]

## 2021-04-19 NOTE — PLAN OF CARE
SLP: Orders received. Chart reviewed and discussed with care team. Patient passed the swallow screen and tolerating a regular diet and thin liquids. Ate 100%  of his lunch.  Speech/language is intact at bedside and he reports no problems SLP not indicated due to no skilled needs at thi time. Defer discharge recommendations to OT/PT needs. Will complete orders.

## 2021-04-19 NOTE — PHARMACY-CONSULT NOTE
Pharmacy Consult to evaluate for medication related stroke core measures    Todd S Aschoff, 65 year old male admitted for cva on 4/18/2021.    Thrombolytic was not given because of INR    VTE Prophylaxis SCDs /PCDs placed on 4/18, as appropriate prior to end of hospital day 2.    Antithrombotic: warfarin, lovenox started on 4/18, as appropriate by end of hospital day 2. Continue antithrombotic therapy on discharge to meet quality measures, unless contraindicated.    Anticoagulation if history of A-fib/flutter: Patient on warfarin; continue anticoagulation on discharge to meet quality measures, unless contraindicated.    No components found for: LDLCALC    Patient currently receiving Lipitor (atorvastatin) continue statin on discharge to meet quality measures, unless contraindicated.    Recommendations: None at this time    Thank you for the consult.    Florecita Coleman RPH 4/19/2021 8:02 AM    Severe aortic stenosis Severe aortic stenosis Severe aortic stenosis Severe aortic stenosis Severe aortic stenosis Severe aortic stenosis

## 2021-04-19 NOTE — PLAN OF CARE
Pt here with with L CVA. A&Ox4. Neuros intact, previous R hand symptoms resolved. VSS. Tele NSR. Regular diet. Takes pills whole with water. Lower LE wrapped with ACE bandage, gets wound care at home. Up with SBA. Denies pain. Pt scoring green on the Aggression Stop Light Tool. On Contact precautions for MRSA in 2013. Plan for echo today.

## 2021-04-19 NOTE — PHARMACY-ANTICOAGULATION SERVICE
Clinical Pharmacy - Warfarin Dosing Consult     Pharmacy has been consulted to manage this patient s warfarin therapy.  Indication: Mechanical Aortic Valve Replacement  Therapy Goal: INR 2.5-3.5  Provider/Team: Hospitalist  Warfarin Prior to Admission: Yes  Warfarin PTA Regimen: 10mg 4/12 then 5mg daily  Significant drug interactions: Propafenone - has been on PTA. On lovenox for bridging.  Recent documented change in oral intake/nutrition: (Last [facility] note reported appetite been improving)    INR   Date Value Ref Range Status   04/18/2021 1.82 (H) 0.86 - 1.14 Final   04/14/2021 1.3 (A) 0.90 - 1.10 Final       Recommend warfarin 5 mg today.  Pharmacy will monitor Todd S Aschoff daily and order warfarin doses to achieve specified goal.      Please contact pharmacy as soon as possible if the warfarin needs to be held for a procedure or if the warfarin goals change.

## 2021-04-19 NOTE — PROGRESS NOTES
[facility]  Patient is currently open to home care services with [facility]. The patient is currently receiving RN services.  and home health team have been notified of patient admission. [facility] liaison will continue to follow patient during stay. If appropriate provide orders to resume home care at time of discharge.    Cara Uribe RN   [facility] Liaison   [phone]

## 2021-04-19 NOTE — PROGRESS NOTES
[facility]    Hospitalist Progress Note    Assessment & Plan   Todd S Aschoff is a 65 year old male who was admitted on 4/18/2021.     Past medical history significant for Aortic valve Dz s/p mechanical valve replacement on chronic coumadin, Atrial fib, HLP, Hypothyroidism, Obesity, Dementia, MDD with anxiety, Overactive Bladder and Possible Seizure D/O, Recent history of CVA (1/2021) who was admitted to [facility] for acute CVA.      Acute CVA   Recent CVA (1/22/2021)   Subtherapeutic INR  Patient presented with right extremity weakness.  *Head CT and CTA Head/Neck negative for hemorrhage or LVO.    *Brain MRI 4/18/2021 confirmed small acute infarcts scattered throughout the left greater than the right cerebral hemisphere, multiple old infarcts.  *Recent INR subtherapeutic (1.3) and was started on subcutaneous lovenox bridging and continued coumadin.    *LIPID: 254/43/164/233.    *Troponin: Negative x2.    - Neurology consult appreciated:  - Telemetry.  - Echo.   - Regular diet.    - PT/OT/SLP.  - Continue subcutaneous Lovenox and coumadin (pharamcy consult to assist with management).   --Monitor INR (Goal 2.5-3.5).    - Stop simvastatin and start Lipitor 40 mg/d.    - Allow for permissive HTN (Goal SBP < 220).    - Neuro checks every 2-4 hours.      Subtherapeutic INR  - Continue subcutaneous Lovenox and coumadin (pharamcy consult to assist with management).   --Monitor INR (Goal 2.5-3.5).     Left lower extremity surgical wound  S/p evacuation of infected hematoma and split thickness skin graft 1/2021.  - Continued wound cares.      Mechanical Aortic Valve  - Continue subcutaneous Lovenox and coumadin (pharamcy consult to assist with management).   --Monitor INR (Goal 2.5-3.5).   - Echo.      Seizure D/O, potential  Follows with Neurology as an outpatient and during previous admission 1/2021 where incidental right frontal CVA found patient was also started on Lamictal 25 mg/d for  potential seizure.    - Resumed on Lamictal 25 mg/d.      Atrial fib, chronic  Rate controlled and managed PTA with propafenone.  EGK with NSR.    - Telemetry.    - Resumed on PTA propafenone 150 mg every 8 hours.      HLP  Managed PTA with simvastatin.  - Lipid panel.    - Stop simvastatin and start Lipitor 40 mg/d.      Hypothyroidism  - Resumed on PTA levothyroxine 150 mcg/d.      Right lateral rectus palsy  Chronic.  No interventions.      Dementia  - Resumed on PTA Aricept 20 mg at bedtime and Namenda 10 mg BID.      MDD with anxiety  - Resumed on PTA Effexor- mg/d.      Overactive Bladder  - Resumed on PTA mirabegron.      Chronic Back Pain  - Resumed on PTA Lyrica.      Obesity   Body mass index is 40.3 kg/m .  Increase in all-cause morbidity and mortality.   - Encourage weight loss.  - Follow up with PCP regarding ongoing management.      - Recommend sleep study as an outpatient.      ADDENDUM 13:35  Family communication.  Called and updated patient's wife.      Diet: Regular Diet Adult     DVT Prophylaxis: Enoxaparin (Lovenox) SQ, Warfarin and Pneumatic Compression Devices   Zimmer Catheter: not present  Code Status: Full Code     Disposition Plan    Expected discharge: 2 - 3 days, recommended to prior living arrangement once Neuro work-up completed, safe dispo plan in place.   Entered: Edison Duff PA-C 04/19/2021, 11:13 AM        The patient's care was discussed with the Patient.      The patient has been discussed with Dr. Blackwood, who agrees with the assessment and plan at this time.  Dr. Blackwood will evaluate the patient independently.      Niles Duff PA-C   [phone]    Interval History   Patient was seated in bed upon arrival.  He was video-chatting with a stroke educator upon arrival.      He denied fever, chills, chest pain, shortness of breath or abdominal pain.  His right hand weakness and discoordination has resolved.      Reviewed plan for the day regarding Neurology  consult, ECHO and lab studies.      -Data reviewed today: I reviewed all new labs and imaging results over the last 24 hours. I personally reviewed no images or EKG's today.    Physical Exam   Temp: 97.6  F (36.4  C) Temp src: Oral BP: 120/81 Pulse: 73   Resp: 16 SpO2: 93 % O2 Device: None (Room air)    Vitals:    04/18/21 1425   Weight: 141.4 kg (311 lb 11.7 oz)     Vital Signs with Ranges  Temp:  [97.5  F (36.4  C)-98  F (36.7  C)] 97.6  F (36.4  C)  Pulse:  [65-75] 73  Resp:  [10-20] 16  BP: (120-174)/() 120/81  SpO2:  [93 %-99 %] 93 %  I/O last 3 completed shifts:  In: 480 [P.O.:480]  Out: 375 [Urine:375]      Constitutional: Awake, alert, cooperative, no apparent distress.    ENT: Normocephalic, without obvious abnormality, atraumatic, oral pharynx with moist mucus membranes, tonsils without erythema or exudates.  Neck: Supple, symmetrical, trachea midline, no adenopathy.  Pulmonary: No increased work of breathing, good air exchange, clear to auscultation bilaterally, no crackles or wheezing.  Cardiovascular: Regular rate and rhythm with a mechanical click (Known aortic mech valve), normal S1 and S2, no S3 or S4, and no murmur noted.  GI: Normal bowel sounds, soft, non-distended, non-tender.  Skin/Integumen: Clear.  Neuro: CN II-XII grossly intact.  Psych:  Alert and oriented x 3. Normal affect.  Extremities: No lower extremity edema noted, and calves are non-TTP bilaterally.       Medications     - MEDICATION INSTRUCTIONS -       - MEDICATION INSTRUCTIONS -       Warfarin Therapy Reminder         atorvastatin  40 mg Oral QPM     azelastine  1 spray Both Nostrils BID     donepezil  20 mg Oral At Bedtime     enoxaparin ANTICOAGULANT  105 mg Subcutaneous Q12H     folic acid  5 mg Oral Daily     lamoTRIgine  25 mg Oral Daily     levothyroxine  150 mcg Oral Daily     memantine  10 mg Oral BID     mirabegron  50 mg Oral Daily     pregabalin  100 mg Oral TID     propafenone  150 mg Oral Q8H RANGEL     sodium  chloride (PF)  3 mL Intracatheter Q8H     venlafaxine  300 mg Oral Daily     warfarin ANTICOAGULANT  10 mg Oral ONCE at 18:00       Data   Recent Labs   Lab 04/19/21  0931 04/19/21  0305 04/18/21  1759 04/18/21  1409 04/14/21   WBC 6.7  --   --  7.6  --    HGB 14.9  --   --  14.5  --    MCV 91  --   --  91  --      --   --  220  --    INR 1.70*  --   --  1.82* 1.3*     --   --  141  --    POTASSIUM 4.2  --  4.5 3.9  --    CHLORIDE 104  --   --  105  --    CO2 33*  --   --  35*  --    BUN 20  --   --  21  --    CR 1.11  --   --  1.11  --    ANIONGAP 1*  --   --  1*  --    CATERINA 9.0  --   --  8.7  --    *  --   --  117*  --    TROPI  --  <0.015  --  <0.015  --        Recent Results (from the past 24 hour(s))   CT Head w/o Contrast    Narrative    CT OF THE HEAD WITHOUT CONTRAST   4/18/2021 2:18 PM     COMPARISON: Head MRI 1/22/2001    HISTORY: Slurred speech    TECHNIQUE:  Axial CT images of the head from the skull base to the  vertex were acquired without IV contrast.    FINDINGS:   INTRACRANIAL CONTENTS: No intracranial hemorrhage, extraaxial  collection, or mass effect.  No CT evidence of acute infarct.    Chronic infarcts in the right temporal occipital parenchyma and  cerebellar hemispheres again noted. Superimposed mild generalized  volume loss.    VISUALIZED ORBITS/SINUSES/MASTOIDS: No significant orbital  abnormality.  No significant paranasal sinus mucosal disease. No  significant middle ear or mastoid effusion.    OSSEOUS STRUCTURES/SOFT TISSUES: No significant abnormality.      Impression    IMPRESSION:  1.  No CT evidence of a mass, hemorrhage or acute stroke.  2.  Chronic infarcts in the right temporal occipital parenchyma and  cerebellar hemispheres.  3.  No change.    Radiation dose for this scan was reduced using automated exposure  control, adjustment of the mA and/or kV according to patient size, or  iterative reconstruction technique    GENET CASEY MD   CTA Head Neck with  Contrast    Addendum: 4/18/2021    Findings were discussed with Dr. Spear at 2:41 PM hours on  4/18/2021.    GENET CASEY MD      Narrative    CT ANGIOGRAM OF THE HEAD AND NECK WITH CONTRAST  4/18/2021 2:20 PM     COMPARISON: Head MRA and neck MRA examinations 1/22/2001    HISTORY: Slurred speech. Acute stroke suspected.    TECHNIQUE:    Precontrast localizing scans were followed by CT angiography with an  injection of 70 mL [id number]-370 nonionic intravenous contrast material  with scans through the head and neck.  Images were transferred to a  separate 3-D workstation where multiplanar reformations and 3-D images  were created.  Estimates of carotid stenoses are made relative to the  distal internal carotid artery diameters except as noted.      FINDINGS:   HEAD CTA:  ANTERIOR CIRCULATION: No significant stenosis or occlusion. Standard  [place] anatomy.    POSTERIOR CIRCULATION: No significant stenosis or occlusion. Balanced  vertebral arteries supply a normal basilar artery.    ANEURYSM/VASCULAR MALFORMATION: None.    NECK CTA:  RIGHT CAROTID: No measurable stenosis in the right ICA based on NASCET  criteria.    LEFT CAROTID: No measurable stenosis in the left ICA based on NASCET  criteria.     VERTEBRAL ARTERIES: Balanced vertebral arteries are patent in the neck  and into the head.     AORTIC ARCH: Classic aortic arch anatomy with no significant stenosis  at the origin of the great vessels.      Impression    IMPRESSION:    HEAD CTA:  1.  Normal head CTA.  2.  No significant stenosis.  No aneurysm or vascular malformation.  3.  No significant change.    NECK CTA:  1.  Normal neck CTA  for age.  2.  No significant stenosis as per NASCET criteria.  3.  No significant change.    Radiation dose for this scan was reduced using automated exposure  control, adjustment of the mA and/or kV according to patient size, or  iterative reconstruction technique    GENET CASEY MD   MR Brain w/o & w Contrast     Narrative    MRI BRAIN WITHOUT AND WITH CONTRAST  4/18/2021 4:03 PM    HISTORY:  Neuro deficit, acute, stroke suspected; right  weakness,  CT/CTA negative.     TECHNIQUE:  Multiplanar, multisequence MRI of the brain without and  with 14 mL Gadavist.    COMPARISON: Head MRI 1/22/2021.    FINDINGS:  Mild motion artifact. Multiple small areas of diffusion restriction  are present scattered throughout the left middle cerebral artery  distribution involving the cortex and subcortical white matter of the  left posterior frontal lobe. Small area of diffusion restriction  involving the cortex of the right anterior middle frontal gyrus in a  different location compared to the prior exam. Probable small area of  diffusion restriction involving the right frontal lobe along the  posterior aspect of the superior frontal sulcus. Questionable areas of  subtle diffusion restriction at the bilateral superior parietal  lobules near the vertex, limited due to artifact. No evidence of  recent hemorrhage. A few scattered microhemorrhages are present  predominantly near the gray-white junctions. Area of branching linear  enhancement within the left cerebellum consistent with incidental  benign developmental venous anomaly. Multiple old bilateral cerebellar  infarcts are present. Old right occipital lobe infarct. Probable small  old periventricular white matter infarcts. Major intracranial flow  voids are maintained.    The visualized calvarium, tympanic cavities, and mastoid cavities are  unremarkable. Right lens replacement is present.      Impression    IMPRESSION:  1. Small acute infarcts scattered throughout the left greater than  right cerebral hemispheres.  2. Volume loss, chronic small vessel ischemic change, and multiple old  infarcts.    BRADLEY ROMO MD   MR Brain w/o & w Contrast    Narrative    MRI BRAIN WITHOUT AND WITH CONTRAST  4/18/2021 7:40 PM    HISTORY:  Neuro deficit, acute, stroke suspected; Acute  "ischemic  stroke.     TECHNIQUE:  Multiplanar, multisequence MRI of the brain without and  with 14 mL Gadavist.     COMPARISON: Brain MRI 4/18/2021 at 15:42    FINDINGS:  Intravenous contrast is present on \"noncontrast\" portion of this exam  related to recent brain MRI performed the same day at 15:42.    Mild motion artifact. Multiple small areas of diffusion restriction  are present throughout the left greater than right frontal lobes. No  evidence of hemorrhage. A few scattered microhemorrhages are present  near the gray-white junctions. Area of branching linear enhancement  within the left cerebellum most consistent with incidental development  venous anomaly, considered to be normal variant. Multiple old  bilateral cerebellar infarcts. Old right occipital lobe infarct with  evidence of laminar necrosis. Probable old periventricular white  matter infarcts. Subarachnoid FLAIR T2 hyperintensity is present  likely related to recent intravenous contrast administration. Major  intracranial flow voids are maintained.    The visualized calvarium, tympanic cavities, mastoid cavities, and  paranasal sinuses are unremarkable. Right lens replacement noted.      Impression    IMPRESSION:  1. No significant change compared to same day brain MRI with and  without contrast performed at 15:42.  2. Multiple small infarcts scattered throughout the left greater than  right cerebral hemispheres.  3. Volume loss, chronic small vessel ischemic change, and multiple old  infarcts.    BRADLEY ROMO MD       "

## 2021-04-20 ENCOUNTER — APPOINTMENT (OUTPATIENT)
Dept: ULTRASOUND IMAGING | Facility: CLINIC | Age: 66
End: 2021-04-20
Attending: PHYSICIAN ASSISTANT
Payer: COMMERCIAL

## 2021-04-20 ENCOUNTER — TELEPHONE (OUTPATIENT)
Dept: INTERNAL MEDICINE | Facility: CLINIC | Age: 66
End: 2021-04-20

## 2021-04-20 ENCOUNTER — APPOINTMENT (OUTPATIENT)
Dept: OCCUPATIONAL THERAPY | Facility: CLINIC | Age: 66
End: 2021-04-20
Payer: COMMERCIAL

## 2021-04-20 ENCOUNTER — APPOINTMENT (OUTPATIENT)
Dept: PHYSICAL THERAPY | Facility: CLINIC | Age: 66
End: 2021-04-20
Payer: COMMERCIAL

## 2021-04-20 LAB
ANION GAP SERPL CALCULATED.3IONS-SCNC: 2 MMOL/L (ref 3–14)
BUN SERPL-MCNC: 22 MG/DL (ref 7–30)
CALCIUM SERPL-MCNC: 8.9 MG/DL (ref 8.5–10.1)
CHLORIDE SERPL-SCNC: 105 MMOL/L (ref 94–109)
CO2 SERPL-SCNC: 31 MMOL/L (ref 20–32)
CREAT SERPL-MCNC: 1.06 MG/DL (ref 0.66–1.25)
GFR SERPL CREATININE-BSD FRML MDRD: 74 ML/MIN/{1.73_M2}
GLUCOSE BLDC GLUCOMTR-MCNC: 108 MG/DL (ref 70–99)
GLUCOSE BLDC GLUCOMTR-MCNC: 85 MG/DL (ref 70–99)
GLUCOSE SERPL-MCNC: 103 MG/DL (ref 70–99)
INR PPP: 1.73 (ref 0.86–1.14)
POTASSIUM SERPL-SCNC: 4 MMOL/L (ref 3.4–5.3)
SODIUM SERPL-SCNC: 138 MMOL/L (ref 133–144)

## 2021-04-20 PROCEDURE — 85610 PROTHROMBIN TIME: CPT | Performed by: INTERNAL MEDICINE

## 2021-04-20 PROCEDURE — 250N000013 HC RX MED GY IP 250 OP 250 PS 637: Performed by: PHYSICIAN ASSISTANT

## 2021-04-20 PROCEDURE — 99222 1ST HOSP IP/OBS MODERATE 55: CPT | Performed by: PSYCHIATRY & NEUROLOGY

## 2021-04-20 PROCEDURE — 36415 COLL VENOUS BLD VENIPUNCTURE: CPT | Performed by: INTERNAL MEDICINE

## 2021-04-20 PROCEDURE — 99207 PR CONSULT E&M CHANGED TO INITIAL LEVEL: CPT | Performed by: PSYCHIATRY & NEUROLOGY

## 2021-04-20 PROCEDURE — 999N001017 HC STATISTIC GLUCOSE BY METER IP

## 2021-04-20 PROCEDURE — 99232 SBSQ HOSP IP/OBS MODERATE 35: CPT | Performed by: PHYSICIAN ASSISTANT

## 2021-04-20 PROCEDURE — 250N000013 HC RX MED GY IP 250 OP 250 PS 637: Performed by: INTERNAL MEDICINE

## 2021-04-20 PROCEDURE — 250N000011 HC RX IP 250 OP 636: Performed by: INTERNAL MEDICINE

## 2021-04-20 PROCEDURE — 120N000001 HC R&B MED SURG/OB

## 2021-04-20 PROCEDURE — 97112 NEUROMUSCULAR REEDUCATION: CPT | Mod: GO | Performed by: OCCUPATIONAL THERAPIST

## 2021-04-20 PROCEDURE — 97110 THERAPEUTIC EXERCISES: CPT | Mod: GP

## 2021-04-20 PROCEDURE — 97116 GAIT TRAINING THERAPY: CPT | Mod: GP

## 2021-04-20 PROCEDURE — 80048 BASIC METABOLIC PNL TOTAL CA: CPT | Performed by: INTERNAL MEDICINE

## 2021-04-20 PROCEDURE — 93971 EXTREMITY STUDY: CPT | Mod: LT

## 2021-04-20 RX ORDER — ASPIRIN 81 MG/1
81 TABLET ORAL DAILY
Status: DISCONTINUED | OUTPATIENT
Start: 2021-04-20 | End: 2021-04-26 | Stop reason: HOSPADM

## 2021-04-20 RX ORDER — WARFARIN SODIUM 10 MG/1
10 TABLET ORAL
Status: COMPLETED | OUTPATIENT
Start: 2021-04-20 | End: 2021-04-20

## 2021-04-20 RX ADMIN — MEMANTINE 10 MG: 10 TABLET ORAL at 21:32

## 2021-04-20 RX ADMIN — PROPAFENONE HYDROCHLORIDE 150 MG: 150 TABLET, FILM COATED ORAL at 15:18

## 2021-04-20 RX ADMIN — ASPIRIN 81 MG: 81 TABLET, DELAYED RELEASE ORAL at 17:13

## 2021-04-20 RX ADMIN — ENOXAPARIN SODIUM 105 MG: 120 INJECTION SUBCUTANEOUS at 21:31

## 2021-04-20 RX ADMIN — FOLIC ACID 5 MG: 1 TABLET ORAL at 15:18

## 2021-04-20 RX ADMIN — LAMOTRIGINE 25 MG: 25 TABLET ORAL at 15:19

## 2021-04-20 RX ADMIN — ENOXAPARIN SODIUM 105 MG: 120 INJECTION SUBCUTANEOUS at 08:54

## 2021-04-20 RX ADMIN — MIRABEGRON 50 MG: 50 TABLET, FILM COATED, EXTENDED RELEASE ORAL at 17:14

## 2021-04-20 RX ADMIN — PROPAFENONE HYDROCHLORIDE 150 MG: 150 TABLET, FILM COATED ORAL at 06:15

## 2021-04-20 RX ADMIN — WARFARIN SODIUM 10 MG: 10 TABLET ORAL at 17:15

## 2021-04-20 RX ADMIN — PREGABALIN 100 MG: 100 CAPSULE ORAL at 21:32

## 2021-04-20 RX ADMIN — PROPAFENONE HYDROCHLORIDE 150 MG: 150 TABLET, FILM COATED ORAL at 21:32

## 2021-04-20 RX ADMIN — PREGABALIN 100 MG: 100 CAPSULE ORAL at 08:53

## 2021-04-20 RX ADMIN — AZELASTINE HYDROCHLORIDE 1 SPRAY: 137 SPRAY, METERED NASAL at 08:53

## 2021-04-20 RX ADMIN — DONEPEZIL HYDROCHLORIDE 20 MG: 10 TABLET ORAL at 21:31

## 2021-04-20 RX ADMIN — LEVOTHYROXINE SODIUM 150 MCG: 150 TABLET ORAL at 15:18

## 2021-04-20 RX ADMIN — PREGABALIN 100 MG: 100 CAPSULE ORAL at 17:14

## 2021-04-20 RX ADMIN — VENLAFAXINE HYDROCHLORIDE 300 MG: 75 CAPSULE, EXTENDED RELEASE ORAL at 11:55

## 2021-04-20 RX ADMIN — ATORVASTATIN CALCIUM 40 MG: 40 TABLET, FILM COATED ORAL at 21:31

## 2021-04-20 RX ADMIN — MEMANTINE 10 MG: 10 TABLET ORAL at 08:53

## 2021-04-20 ASSESSMENT — ACTIVITIES OF DAILY LIVING (ADL)
ADLS_ACUITY_SCORE: 13
ADLS_ACUITY_SCORE: 15

## 2021-04-20 NOTE — PROGRESS NOTES
[facility]    Hospitalist Progress Note    Assessment & Plan   Todd S Aschoff is a 65 year old male who was admitted on 4/18/2021.     Past medical history significant for Aortic valve Dz s/p mechanical valve replacement on chronic coumadin, Atrial fib, HLP, Hypothyroidism, Obesity, Dementia, MDD with anxiety, Overactive Bladder and Possible Seizure D/O, Recent history of CVA (1/2021) who was admitted to [facility] for acute CVA.      Acute CVA   Recent CVA (1/22/2021)   Subtherapeutic INR  Patient presented with right extremity weakness.  *Head CT and CTA Head/Neck negative for hemorrhage or LVO.    *Brain MRI 4/18/2021 confirmed small acute infarcts scattered throughout the left greater than the right cerebral hemisphere, multiple old infarcts.  *Recent INR subtherapeutic (1.3) and was started on subcutaneous lovenox bridging and continued coumadin.    *LIPID: 254/43/164/233.    *Troponin: Negative x2.    *ECHO: Technically difficult study and limited.  EF of 55-60%, RV normal size and function.  Noted mech aortic valve that is poorly visualized.  Negative bubble study.  Similar findings when compared to previous ECHO 1/2021.    - Neurology consult appreciated:   --Discussed with Jimena Lin PA-C 4/20.     --Continue warfarin and bridging until therapeutic.     --Consult Cardiology to discuss adding ASA therapy.    - Telemetry.  - Regular diet.    - PT/OT/SLP.  - Continue subcutaneous Lovenox and coumadin (pharamcy consult to assist with management).   --Monitor INR (Goal 2.5-3.5).    - Stop simvastatin and start Lipitor 40 mg/d.    - Allow for permissive HTN (Goal SBP < 220).    - Neuro checks every 2-4 hours.      Subtherapeutic INR  - Continue subcutaneous Lovenox and coumadin (pharamcy consult to assist with management).  - Monitor INR (Goal 2.5-3.5).     Left lower extremity surgical wound  S/p evacuation of infected hematoma and split thickness skin graft 1/2021.  - WOC consult  requested for continued wound management.      Mechanical Aortic Valve  - Continue subcutaneous Lovenox and coumadin (pharamcy consult to assist with management).  - Monitor INR (Goal 2.5-3.5).     Seizure D/O, potential  Follows with Neurology as an outpatient and during previous admission 1/2021 where incidental right frontal CVA found patient was also started on Lamictal 25 mg/d for potential seizure.    - Resumed on Lamictal 25 mg/d.      Atrial fib, chronic  Rate controlled and managed PTA with propafenone.  EKG with NSR.    - Telemetry.    - Resumed on PTA propafenone 150 mg every 8 hours.      HLP  Managed PTA with simvastatin.  - Lipid panel.    - Stop simvastatin and start Lipitor 40 mg/d.      Hypothyroidism  - Resumed on PTA levothyroxine 150 mcg/d.      Right lateral rectus palsy  Chronic.  No interventions.      Dementia  - Resumed on PTA Aricept 20 mg at bedtime and Namenda 10 mg BID.      MDD with anxiety  - Resumed on PTA Effexor- mg/d.      Overactive Bladder  - Resumed on PTA mirabegron.      Chronic Back Pain  - Resumed on PTA Lyrica.      Obesity   Body mass index is 40.3 kg/m .  Increase in all-cause morbidity and mortality.   - Encourage weight loss.  - Follow up with PCP regarding ongoing management.      - Recommend sleep study as an outpatient.        Diet: Regular Diet Adult     DVT Prophylaxis: Enoxaparin (Lovenox) SQ, Warfarin and Pneumatic Compression Devices   Zimmer Catheter: not present  Code Status: Full Code     Disposition Plan    Expected discharge: 2 - 3 days, recommended to prior living arrangement once Neuro work-up completed, INR therapeutic and safe dispo plan.   Entered: Edison Duff PA-C 04/20/2021, 1:36 PM        The patient's care was discussed with the Patient.      The patient has been discussed with Dr. Blackwood, who agrees with the assessment and plan at this time.  Dr. Blackwood will evaluate the patient independently.      Niles Duff PA-C    [phone]    Interval History   Patient was resting in bed upon arrival.  He denied fever, chills, chest pain, shortness of breath or abdominal pain.  He has full recovery of right hand weakness at this time.      Reviewed plan for close monitoring and working on getting INR to therapeutic level. Reviewed last wound change for left leg and he said a week ago and that they occur weekly.  Discussed getting WOC to assess.      Neurology service presented as I finished my physical exam.  Spoke with Jimena Lin PA-C shortly after to review plans.  Will proceed with Cardiology consult to assess for possible initiation of aspirin therapy.      -Data reviewed today: I reviewed all new labs and imaging results over the last 24 hours. I personally reviewed no images or EKG's today.    Physical Exam   Temp: 97.9  F (36.6  C) Temp src: Oral BP: (!) 131/91 Pulse: 70   Resp: 18 SpO2: 92 % O2 Device: None (Room air)      Vitals:    04/18/21 1425   Weight: 141.4 kg (311 lb 11.7 oz)     Vital Signs with Ranges  Temp:  [97.8  F (36.6  C)-98.5  F (36.9  C)] 98  F (36.7  C)  Pulse:  [70-83] 74  Resp:  [12-20] 18  BP: (111-150)/(63-91) 126/91  SpO2:  [92 %-100 %] 100 %  I/O last 3 completed shifts:  In: 900 [P.O.:900]  Out: 350 [Urine:350]      Constitutional: Awake, alert, cooperative, NAD.    ENT: NCAT, oral pharynx with moist mucus membranes, tonsils without erythema or exudates.  Neck: Supple, symmetrical, trachea midline, no adenopathy.  Pulmonary: No increased work of breathing, good air exchange, CTA bilaterally, no crackles or wheezing.  Cardiovascular: R/R/R with a mechanical click (Known aortic mech valve), normal S1 and S2, no S3 or S4, and no murmur noted.  GI: Active bowel sounds, soft, non-distended, non-tender.  Skin/Integumen: Clear.  Neuro: CN II-XII grossly intact.  Psych:  Alert and oriented x 3. Normal affect.  Extremities: No lower extremity edema noted, and calves are non-TTP bilaterally.       Medications      - MEDICATION INSTRUCTIONS -       - MEDICATION INSTRUCTIONS -       Warfarin Therapy Reminder         atorvastatin  40 mg Oral QPM     azelastine  1 spray Both Nostrils BID     donepezil  20 mg Oral At Bedtime     enoxaparin ANTICOAGULANT  105 mg Subcutaneous Q12H     folic acid  5 mg Oral Daily     lamoTRIgine  25 mg Oral Daily     levothyroxine  150 mcg Oral Daily     memantine  10 mg Oral BID     mirabegron  50 mg Oral Daily     pregabalin  100 mg Oral TID     propafenone  150 mg Oral Q8H RANGEL     sodium chloride (PF)  3 mL Intracatheter Q8H     venlafaxine  300 mg Oral Daily     warfarin ANTICOAGULANT  10 mg Oral ONCE at 18:00       Data   Recent Labs   Lab 21  0848 21  0931 21  0305 21  1759 21  1409   WBC  --  6.7  --   --  7.6   HGB  --  14.9  --   --  14.5   MCV  --  91  --   --  91   PLT  --  226  --   --  220   INR 1.73* 1.70*  --   --  1.82*    138  --   --  141   POTASSIUM 4.0 4.2  --  4.5 3.9   CHLORIDE 105 104  --   --  105   CO2 31 33*  --   --  35*   BUN 22 20  --   --  21   CR 1.06 1.11  --   --  1.11   ANIONGAP 2* 1*  --   --  1*   CATERINA 8.9 9.0  --   --  8.7   * 101*  --   --  117*   TROPI  --   --  <0.015  --  <0.015       Recent Results (from the past 24 hour(s))   Echo Limited with Bubble Study    Narrative    [id number]  [id number]  [id number]  [id number]^MARIELA^MATHEW^ILIANA     [facility]  Echocardiography Laboratory  [address] [place] [address]     Name: ASCHOFF, TODD S  MRN: [id number]  : 1955  Study Date: 2021 01:08 PM  Age: 65 yrs  Gender: Male  Patient Location: [facility]  Reason For Study: Cerebrovascular Incident  Ordering Physician: MATHEW SIERRA  Referring Physician: FARRAH MENDEZ  Performed By: Yossi Cifuentes RDCS     BSA: 2.6 m2  Height: 74 in  Weight: 311 lb  HR: 71  BP: 127/94 mmHg  ______________________________________________________________________________  Procedure  Limited Portable Bubble Echo  Adult. Optison (NDC #9492-4104) given  intravenously.  ______________________________________________________________________________  Interpretation Summary     Technically difficult study. Limited echo. Bubble study requested again.     The visual ejection fraction is estimated at 55-60%.  The right ventricle is normal in size and function.  There is a mechanical aortic valve. Poorly visualized. The mean AoV pressure  gradient is 10.0 mmHg. The calculated aortic valve are is 1.9 cm^2.  A contrast injection (Bubble Study) was performed that was negative for flow  across the interatrial septum. Suboptimal image quality overall. Similar  findings reported in Jan 2021.  ______________________________________________________________________________  Left Ventricle  The left ventricle is normal in size. The visual ejection fraction is  estimated at 55-60%. Regional wall motion abnormalities cannot be excluded due  to limited visualization.     Right Ventricle  The right ventricle is normal in size and function.     Atria  A contrast injection (Bubble Study) was performed that was negative for flow  across the interatrial septum.     Mitral Valve  There is mild mitral annular calcification. This degree of valvular  regurgitation is within normal limits. There is trace to mild mitral  regurgitation.     Tricuspid Valve  The tricuspid valve is not well visualized. Right ventricular systolic  pressure could not be approximated due to inadequate tricuspid regurgitation.     Aortic Valve  The peak AoV pressure gradient is 21.0 mmHg. The mean AoV pressure gradient is  10.0 mmHg. The calculated aortic valve are is 1.9 cm^2. There is a mechanical  aortic valve.     Pulmonic Valve  The pulmonic valve is not well visualized.     Vessels  Unable to assess mean RA pressure due to technically difficult study.     Pericardium  There is no pericardial effusion.      ______________________________________________________________________________  MMode/2D Measurements & Calculations  IVSd: 1.0 cm  LVIDd: 4.6 cm  LVIDs: 2.9 cm  LVPWd: 1.3 cm  FS: 37.1 %  LV mass(C)d: 196.1 grams  LV mass(C)dI: 74.8 grams/m2     asc Aorta Diam: 3.1 cm  LVOT diam: 2.3 cm  LVOT area: 4.2 cm2  RWT: 0.55     Doppler Measurements & Calculations  Ao V2 max: 227.0 cm/sec  Ao max P.0 mmHg  Ao V2 mean: 150.0 cm/sec  Ao mean PG: 10.0 mmHg  Ao V2 VTI: 40.6 cm  SHAWN(I,D): 1.9 cm2  SHAWN(V,D): 1.5 cm2  LV V1 max P.8 mmHg  LV V1 max: 84.2 cm/sec  LV V1 VTI: 18.3 cm  SV(LVOT): 76.0 ml  SI(LVOT): 29.0 ml/m2  AV Enrike Ratio (DI): 0.37  SHAWN Index (cm2/m2): 0.71     ______________________________________________________________________________  Report approved by: Nu Villela 2021 03:59 PM

## 2021-04-20 NOTE — PROGRESS NOTES
Brief Cardiology Note:    Asked to comment on ASA therapy for patient with mechanical AVR in the setting of recent embolic CVA and subtherapeutic INR.  Patient has prior hx of AVR in the early 90's and chronic atrial fibrillation. He was recently admitted with embolic CVA in the setting of subtherapeutic INR (INR goal has been previously set at  2.5-3.5).  For general management of patients with mechanical AVR, recommend low dose ASA 81 mg in addition to anticoagulation.  Patient should establish with a cardiologist in the outpatient setting.      Naina Davis MD [facility]

## 2021-04-20 NOTE — PLAN OF CARE
Pt here with small acute/subacute infarcts scattered throughout the left > right cerebral hemispheres. Pt A&O x4, VSS, on RA. LS clear. Denies numbness/tingling, CMS intact. Neuro's intact. Up Independently. Voiding adequately. +BS, passing flatus, BM yesterday. Reg diet, good appetite. Takes pills whole with water. LLE surgical wound, ACE wrapped, receives wound care at home, WOC RN order to assess while here. Skin graft locations on L thigh. INR 1.73, patient's therapeutic range is 2.5-3.5, on coumadin bridging with Lovenox, will start 81mg ASA, Cardiology ok with starting aspirin, see note. BLE US ordered. Plans to discharge to home with OP PT when INR therapeutic. Pt scoring green on Aggression Stop Light Tool.

## 2021-04-20 NOTE — PLAN OF CARE
Shift Note: 2300--0700  Pt here with small LS Acute Stroke. A&Ox4. VSS on Ra. Neuros intact. Tele SR. Regular diet. Takes pills whole with water. Lower LE wrapped with ACE bandage, gets wound care at home. Up Ind in room. Denies pain. Skin graft locations on L. Thigh. Voiding using urinal at bedside. Pt. Scoring green on aggression stoplight tool. Discharge pending neuro work up, possibly today. Continue to monitor.

## 2021-04-20 NOTE — TELEPHONE ENCOUNTER
Fax received from [employer] [facility]  for review and signature.  Put in Dr. Ingram's yellow folder.

## 2021-04-20 NOTE — CONSULTS
"[facility]    Stroke Consult Note    Reason for Consult: \"stroke\"    Chief Complaint: One-sided Weakness     HPI  Todd S Aschoff is a 65 year old male PMH significant for aortic dissection s/p mechanical valve/graft, atrial fibrillation on warfarin, hx of stroke and seizure activty. Patient presents to the emergency department for evaluation of new onset RUE weakness that started an hour prior to presentation. Patients INR this week was subtherapeutic (1.3), so he was started on Lovenox. In the emergency department patient with continued RUE weakness, but no other focal neuro deficits reported. Initial CT/CTA negative for hemorrhage or LVO.   Today patient reports symptom were localization to his right hand, stating he was unable to move his fingers but had full strength in his thumb. On current exam, he reports improvement in strength. 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     Stroke Evaluation summarized:  MRI/Head CT: MRI brain with small acute/subacute infarcts scattered throughout the left > right cerebral hemispheres  Intracranial Vascular Imaging: CTA head negative for LVO or significant occlusions   Cervical Carotid and Vertebral Artery Vascular Imaging: CTA neck unrevealing   Echocardiogram: EF 55-60%, negative bubble study, no mention of atrial size in report   EKG/Telemetry: SR   LDL: 164  A1c: 4.9  Troponin: <0.015   Other testing: Not Applicable    Impression  65 year old male on Warfarin and Lovenox who presented to emergency department for evaluation of new RUE weakness. MRI brain reveals scattered infarcts involving bilateral hemispheres     Recommendations  -Cardiology consult placed regarding initiation of Aspirin to current therapy. Appreciate their input   -Continue PTA Warfarin and Lovenox therapy. Patient reports INR goal prior to admission as (2.5-3.5). Generally INR goal is 2-3, but will defer this to " "Cardiology.   -Recommend patient follow up with Cardiology as an outpatient, appears referral has been placed in the past, but I do not see any recent visits.   -LDL (164) with goal LDL < 100. Patient started on Lipitor 40 mg daily. Recheck LDL in 3 months with PCP, titrate medication as needed to reach target goal  -A1c within goal   -Goal BP <140/90 with tighter control associated with decreased overall CV risk, if tolerated  -Therapies as needed     ADDENDUM 4/20/2021:  Received a call from Dr. Davis with Cardiology to discuss patient's case. Per our conversation, it is recommended this patient be started on Aspirin 81 mg, which has been ordered.      Patient Follow-up    - in the next 1-2 week(s) with PCP   -Patient recently seen in Stroke clinic on 4/16/2021, per chart review it looks like patient was to follow up with [facility], this has yet to be scheduled. Will place second referral to ensure this is completed.     Thank you for this consult. No further stroke evaluation is recommended, so we will sign off. Please contact us with any additional questions.    Jimena Lin PA-C   Neurology  To page me or covering stroke neurology team member, click here: [facility]COM   Choose \"On Call\" tab at top, then search dropdown box for \"Neurology Adult\", select location, press Enter, then look for stroke/neuro ICU/telestroke.  _____________________________________________________    Past Medical History   Past Medical History:   Diagnosis Date     Alcohol dependence (H)      Allergy, unspecified not elsewhere classified     seasonal     Antiplatelet or antithrombotic long-term use     coumadin/lovenox     Aortic valve prosthesis present      Atrial fibrillation (H)      Blood transfusion     after heart surg 1992     BPH (benign prostatic hypertrophy)      Chronic infection     low back wound incision not healing      Chronic pain     lower back and right leg and left leg     Coagulation disorder (H)     on blood " thinners     Dissection of aorta, thoracic (H) 1992    St Jose F aotic valve + arch graft 1992     Headache(784.0)      Heart murmur     aortic valve replaced and arch     History of spinal cord injury      Low back pain      Major depression      Mixed hyperlipidemia      Numbness and tingling     right leg post surg rightt hip/ also left leg since surg     OA (osteoarthritis)     hips     Obesity, unspecified      Prostate infection      Sciatica 2002    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 Jose F's valve     APPLY WOUND VAC Left 1/26/2021    Procedure: Placement of negative pressure wound therapy 2,400 squared centimeters  ;  Surgeon: Lazaro Plascencia MD;  Location: [facility] OR     C TOTAL HIP ARTHROPLASTY  2010    Left AMANDA     C TOTAL HIP ARTHROPLASTY  2002    R hip replacement comp's by nerve injury with pain down into R leg, nadya below knee     CATARACT IOL, RT/LT      rt eye only     CHOLECYSTECTOMY, LAPOROSCOPIC  8/10     CLOSE SECONDARY WOUND LOWER EXTREMITY Left 2/3/2021    Procedure:  Split Thickness Skin Graft to Leg of 18 square centimeters  Intermediate Closure of Leg of 8cm   ;  Surgeon: Lazaro Plascencia MD;  Location: [facility] OR     COLONOSCOPY N/A 1/15/2015    Procedure: COLONOSCOPY;  Surgeon: Johnson Kothari MD;  Location: [facility] GI     DECOMPRESSION LUMBAR ONE LEVEL  4/3/2013    Procedure: DECOMPRESSION LUMBAR ONE LEVEL;  Open Decompression L3-4 bilateral;  Surgeon: Travis Coffey MD;  Location: [facility] OR     DECOMPRESSION, FUSION CERVICAL ANTERIOR ONE LEVEL, COMBINED  3/23/2012    Procedure:COMBINED DECOMPRESSION, FUSION CERVICAL ANTERIOR ONE LEVEL; Anterior Cervical Decompression and Fusion C4-6; Surgeon:TRAVIS COFFEY; Location:[facility] OR     EXPLORE SPINE, REMOVE HARDWARE, COMBINED  5/23/2013    Procedure: COMBINED EXPLORE SPINE, REMOVE HARDWARE;   Exploration Lumbar Wound for fluid collection;  Surgeon: Travis Coffey MD;  Location: [facility] OR     FUSION CERVICAL ANTERIOR TWO LEVELS  3/26/2012    Procedure:FUSION CERVICAL ANTERIOR TWO LEVELS; Anterior Cervical Fusion C4-6, Anterior Cervical  Hematoma Evacuation; Surgeon:TRAVIS COFFEY; Location:[facility] OR     IRRIGATION AND DEBRIDEMENT LOWER EXTREMITY, COMBINED Left 1/10/2021    Procedure: 1.  Irrigation and excisional debridement to muscle left distal medial calf chronic wounds total area measuring 9 cm x 4 cm 2.  Irrigation and excisional debridement to fascia left medial calf chronic hematoma measuring 29 x 6.7 x 4.2 cm 3.  Primary complex wound closure left medial distal calf 9 cm in length;  Surgeon: Rod Kemp MD;  Location: [facility] OR     IRRIGATION AND DEBRIDEMENT LOWER EXTREMITY, COMBINED Left 1/26/2021    Procedure: Evacuation of hematoma debridement of skin, subcutaneous tissue and muscle 2,400 squared centimeters, placement of negative pressure wound therapy 2,400 squared centimeters;  Surgeon: Lazaro Plascencia MD;  Location: [facility] OR     IRRIGATION AND DEBRIDEMENT SPINE, CLOSE WOUND, COMBINED  3/26/2012    Procedure:COMBINED IRRIGATION AND DEBRIDEMENT SPINE, CLOSE WOUND; Surgeon:TRAVIS COFFEY; Location:[facility] OR     removal of cyst of back   2.5week     TONSILLECTOMY       wisdom teeth[       ZZC NONSPECIFIC PROCEDURE  1992    repair of TAA with graft     Medications   Home Meds  Prior to Admission medications    Medication Sig Start Date End Date Taking? Authorizing Provider   acetaminophen (TYLENOL) 500 MG tablet Take 1-2 tablets (500-1,000 mg) by mouth every 8 hours as needed for mild pain 2/7/21  Yes Aurelio Etienne,    azelastine (ASTELIN) 0.1 % nasal spray USE 2 SPRAYS IN NOSTRIL 2 TIMES DAILY AS NEEDED. 5/29/20  Yes Obdulio Ingram MD   cetirizine (ZYRTEC) 10 MG tablet TAKE ONE TABLET BY MOUTH ONCE DAILY AS NEEDED 2/22/21  Yes Obdulio Ingram MD   cyclobenzaprine (FLEXERIL) 10 MG  tablet TAKE ONE TABLET BY MOUTH THREE TIMES DAILY AS NEEDED FOR MUSCLE SPASM. 3/9/21  Yes Obdulio Ingram MD   donepezil (ARICEPT) 10 MG tablet Take 2 tablets (20 mg) by mouth At Bedtime Stop if diarrhea or nausea develop 2/22/21  Yes Jody Tanner MD   enoxaparin ANTICOAGULANT (LOVENOX) 120 MG/0.8ML syringe Inject 0.7 mLs (105 mg) Subcutaneous every 12 hours 4/14/21  Yes Obdulio Ingram MD   folic acid (FOLVITE) 1 MG tablet Take 5 tablets (5 mg) by mouth daily 2/22/21  Yes Jody Tanner MD   furosemide (LASIX) 20 MG tablet Take 1-2 tablets (20-40 mg) by mouth daily as needed (leg swelling) 4/15/21  Yes Obdulio Ingram MD   guanFACINE (TENEX) 1 MG tablet Take 1 tablet (1 mg) by mouth At Bedtime 4/5/21  Yes Savi Ken CNP   lamoTRIgine (LAMICTAL) 25 MG tablet Take 1 tablet (25 mg) by mouth daily 2/22/21  Yes Jody Tanner MD   levothyroxine (SYNTHROID/LEVOTHROID) 150 MCG tablet Take 1 tablet (150 mcg) by mouth daily 12/11/20  Yes Obdulio Ingram MD   liothyronine (CYTOMEL) 25 MCG tablet Take 1 tablet (25 mcg) by mouth daily 2/22/21  Yes Jody Tanner MD   memantine (NAMENDA) 10 MG tablet Take 10 mg by mouth 2 times daily   Yes Unknown, Entered By History   mirabegron (MYRBETRIQ) 50 MG 24 hr tablet Take 1 tablet (50 mg) by mouth daily 12/31/20  Yes Obdulio Ingram MD   potassium chloride ER (K-TAB) 20 MEQ CR tablet Take 1 tablet (20 mEq) by mouth daily as needed (Take one tablet for each furosemide tablet that is taken) 4/15/21  Yes Obdulio Ingram MD   pregabalin (LYRICA) 100 MG capsule Take 1 capsule (100 mg) by mouth 3 times daily Do not take if sleepy/sedated. 4/1/21  Yes Savi Ken CNP   propafenone (RYTHMOL) 150 MG TABS tablet Take 1 tablet (150 mg) by mouth every 8 hours 12/10/20  Yes Obdulio Ingram MD   senna-docusate (SENOKOT-S;PERICOLACE) 8.6-50 MG per tablet Take 1 tablet by mouth 2 times daily as needed for constipation 8/2/18  Yes Obdulio Ingram MD    simvastatin (ZOCOR) 40 MG tablet Take 1 tablet (40 mg) by mouth At Bedtime. Need to update cholesterol level before additional refills. 21  Yes Obdulio Ingram MD   venlafaxine (EFFEXOR-XR) 150 MG 24 hr capsule Take 2 capsules (300 mg) by mouth daily 21  Yes Obdulio Ingram MD   warfarin ANTICOAGULANT (COUMADIN) 5 MG tablet Take 5 mg by mouth daily 10 mg on 21, then 5 mg daily per INR clinic.   Yes Unknown, Entered By History       Scheduled Meds    atorvastatin  40 mg Oral QPM     azelastine  1 spray Both Nostrils BID     donepezil  20 mg Oral At Bedtime     enoxaparin ANTICOAGULANT  105 mg Subcutaneous Q12H     folic acid  5 mg Oral Daily     lamoTRIgine  25 mg Oral Daily     levothyroxine  150 mcg Oral Daily     memantine  10 mg Oral BID     mirabegron  50 mg Oral Daily     pregabalin  100 mg Oral TID     propafenone  150 mg Oral Q8H RANGEL     sodium chloride (PF)  3 mL Intracatheter Q8H     venlafaxine  300 mg Oral Daily       Infusion Meds    - MEDICATION INSTRUCTIONS -       - MEDICATION INSTRUCTIONS -       Warfarin Therapy Reminder         PRN Meds  acetaminophen, cyclobenzaprine, furosemide, lidocaine 4%, lidocaine (buffered or not buffered), - MEDICATION INSTRUCTIONS -, - MEDICATION INSTRUCTIONS -, naloxone **OR** naloxone **OR** naloxone **OR** naloxone, oxyCODONE, potassium chloride ER, senna-docusate, sodium chloride (PF), sodium chloride (PF), Warfarin Therapy Reminder    Allergies   Allergies   Allergen Reactions     Gabapentin      Severe behavioral disturbances     Family History   Family History   Problem Relation Age of Onset     Cerebrovascular Disease Father          age 86, had M.I. ,diabetic also     Prostate Cancer Father      Cancer Mother          age 83 of dementia, also h/o lymphoma     Hypertension Brother         2 brothers with hypertension & sleep apnea     Hypertension Sister         Born ~1936     Sleep Apnea Brother          age 78, Alzheimers      No Known Problems Son      No Known Problems Daughter      No Known Problems Son      Family History Negative Brother         Had 5 brothers, three still alive as of 2019     Colon Cancer No family hx of      Social History   Social History     Tobacco Use     Smoking status: Never Smoker     Smokeless tobacco: Never Used   Substance Use Topics     Alcohol use: No     Comment: Stopped drinking alcohol ~2009     Drug use: No       Review of Systems   The 10 point Review of Systems is negative other than noted in the HPI or here.       PHYSICAL EXAMINATION   Temp:  [97.8  F (36.6  C)-98.5  F (36.9  C)] 97.9  F (36.6  C)  Pulse:  [68-83] 68  Resp:  [12-20] 16  BP: (111-150)/(63-91) 133/89  SpO2:  [92 %-100 %] 95 %    Neurologic  Mental Status:  alert, oriented x 3, follows commands, speech clear and fluent  Cranial Nerves: Duane syndrome (chronic)  visual fields intact, facial sensation intact and symmetric, facial movements symmetric, hearing not formally tested but intact to conversation, no dysarthria, tongue protrusion midline  Motor:  normal muscle tone and bulk, no abnormal movements, able to move all limbs spontaneously, no pronator drift  Reflexes:  deferred  Sensory:  light touch sensation intact and symmetric throughout upper and lower extremities  Coordination:  no obvious ataxia   Station/Gait:  deferred    Dysphagia Screen  Per Nursing    Stroke Scales    NIHSS  Interval transfer (04/18/21 1716)   Interval Comments     1a. Level of Consciousness 0-->Alert, keenly responsive   1b. LOC Questions 0-->Answers both questions correctly   1c. LOC Commands 0-->Performs both tasks correctly   2.   Best Gaze 1-->Partial gaze palsy, gaze is abnormal in one or both eyes, but forced deviation or total gaze paresis is not present(Daune syndrome at baseline)   3.   Visual 0-->No visual loss   4.   Facial Palsy 0-->Normal symmetrical movements   5a. Motor Arm, Left 0-->No drift, limb holds 90 (or 45) degrees for full 10  secs   5b. Motor Arm, Right 0-->No drift, limb holds 90 (or 45) degrees for full 10 secs   6a. Motor Leg, Left 0-->No drift, leg holds 30 degree position for full 5 secs   6b. Motor Leg, right 0-->No drift, leg holds 30 degree position for full 5 secs   7.   Limb Ataxia 0-->Absent   8.   Sensory 0-->Normal, no sensory loss   9.   Best Language 0-->No aphasia, normal   10. Dysarthria 0-->Normal   11. Extinction and Inattention  0-->No abnormality   Total 1 (04/20/21 0800)       Imaging  I personally reviewed all imaging; relevant findings per HPI.    Labs Data   CBC  Recent Labs   Lab 04/19/21  0931 04/18/21  1409   WBC 6.7 7.6   RBC 5.02 5.00   HGB 14.9 14.5   HCT 45.5 45.6    220     Basic Metabolic Panel   Recent Labs   Lab 04/19/21  0931 04/18/21  1759 04/18/21  1409     --  141   POTASSIUM 4.2 4.5 3.9   CHLORIDE 104  --  105   CO2 33*  --  35*   BUN 20  --  21   CR 1.11  --  1.11   *  --  117*   CATERINA 9.0  --  8.7     Liver Panel  No results for input(s): PROTTOTAL, ALBUMIN, BILITOTAL, ALKPHOS, AST, ALT, BILIDIRECT in the last 168 hours.  INR    Recent Labs   Lab Test 04/19/21  0931 04/18/21  1409 04/14/21   INR 1.70* 1.82* 1.3*      Lipid Profile    Recent Labs   Lab Test 04/18/21  1759 01/23/21  0748 12/11/20  1545 12/24/14  0846 12/24/14  0846 12/17/13  1012   CHOL 254* 135 226*   < > 166 170   HDL 43 44 39*   < > 41 30*   * 65 149*   < > 78 74   TRIG 233* 130 192*   < > 234* 331*   CHOLHDLRATIO  --   --   --   --  4.0 5.7*    < > = values in this interval not displayed.     A1C    Recent Labs   Lab Test 01/23/21  0747   A1C 4.9     Troponin I    Recent Labs   Lab 04/19/21  0305 04/18/21  1409   TROPI <0.015 <0.015          Stroke Code / Stroke Consult Data Data This was a non-emergent, non-tele stroke consult.

## 2021-04-20 NOTE — PROGRESS NOTES
UPDATE    Called patient's wife (Jan Aschoff) and updated regarding results and plan.    - Reviewed INR results and plan to continue with warfarin and subcutaneous Lovenox.    - Reviewed ECHO results briefly and stated they were unchanged from previous study.    - Reviewed Neurology recommendations and plan for Cardiology consult.     --Possibly add aspirin therapy.      Discussed concerns regarding low INR and overall plan for patient's INR to be therapeutic prior to discharge.      Discussed concern regarding swelling of the left leg/foot when standing longer than 45 min.  This resolves when leg is elevated.  He was started on PRN Lasix.    - WOC for care of surgical wound.    - Left LE Venous US.      Niles Duff PA-C

## 2021-04-20 NOTE — PLAN OF CARE
"/85 (BP Location: Right arm)   Pulse 74   Temp 97.9  F (36.6  C) (Oral)   Resp 18   Ht 1.873 m (6' 1.75\")   Wt 141.4 kg (311 lb 11.7 oz)   SpO2 92%   BMI 40.30 kg/m      Nursing shift note  Summary: Patient in with a CVA  Alertness: Alert x4  Neuro: Intact except for grzegorz syndrome, numbness in left foot  Cardiac: WDL. Tele is NSR  Resp: Clear  GI: WDL  : WDL  CMS: WDL  Mobility: Ind  Pain: None  Diet: Regular  Skin: Leg wound, dressing changed    Behavior & Aggression Tool color:  -None    Pt's belongings:     (Belongings from admission day present in pt's room)    Home medications:   -None  "

## 2021-04-21 ENCOUNTER — APPOINTMENT (OUTPATIENT)
Dept: PHYSICAL THERAPY | Facility: CLINIC | Age: 66
End: 2021-04-21
Payer: COMMERCIAL

## 2021-04-21 LAB
ALBUMIN SERPL-MCNC: 3.3 G/DL (ref 3.4–5)
ALP SERPL-CCNC: 86 U/L (ref 40–150)
ALT SERPL W P-5'-P-CCNC: 31 U/L (ref 0–70)
ANION GAP SERPL CALCULATED.3IONS-SCNC: 5 MMOL/L (ref 3–14)
AST SERPL W P-5'-P-CCNC: 21 U/L (ref 0–45)
BILIRUB SERPL-MCNC: 1.1 MG/DL (ref 0.2–1.3)
BUN SERPL-MCNC: 18 MG/DL (ref 7–30)
CALCIUM SERPL-MCNC: 8.5 MG/DL (ref 8.5–10.1)
CHLORIDE SERPL-SCNC: 106 MMOL/L (ref 94–109)
CO2 SERPL-SCNC: 27 MMOL/L (ref 20–32)
CREAT SERPL-MCNC: 0.95 MG/DL (ref 0.66–1.25)
GFR SERPL CREATININE-BSD FRML MDRD: 84 ML/MIN/{1.73_M2}
GLUCOSE SERPL-MCNC: 144 MG/DL (ref 70–99)
HGB BLD-MCNC: 14.5 G/DL (ref 13.3–17.7)
INR PPP: 1.97 (ref 0.86–1.14)
POTASSIUM SERPL-SCNC: 3.6 MMOL/L (ref 3.4–5.3)
PROT SERPL-MCNC: 6.8 G/DL (ref 6.8–8.8)
SODIUM SERPL-SCNC: 138 MMOL/L (ref 133–144)

## 2021-04-21 PROCEDURE — 97530 THERAPEUTIC ACTIVITIES: CPT | Mod: GP

## 2021-04-21 PROCEDURE — 99231 SBSQ HOSP IP/OBS SF/LOW 25: CPT | Performed by: PHYSICIAN ASSISTANT

## 2021-04-21 PROCEDURE — 85610 PROTHROMBIN TIME: CPT | Performed by: INTERNAL MEDICINE

## 2021-04-21 PROCEDURE — 250N000013 HC RX MED GY IP 250 OP 250 PS 637: Performed by: INTERNAL MEDICINE

## 2021-04-21 PROCEDURE — 250N000013 HC RX MED GY IP 250 OP 250 PS 637: Performed by: PHYSICIAN ASSISTANT

## 2021-04-21 PROCEDURE — 120N000001 HC R&B MED SURG/OB

## 2021-04-21 PROCEDURE — 250N000011 HC RX IP 250 OP 636: Performed by: INTERNAL MEDICINE

## 2021-04-21 PROCEDURE — 80053 COMPREHEN METABOLIC PANEL: CPT | Performed by: INTERNAL MEDICINE

## 2021-04-21 PROCEDURE — 97112 NEUROMUSCULAR REEDUCATION: CPT | Mod: GP

## 2021-04-21 PROCEDURE — G0463 HOSPITAL OUTPT CLINIC VISIT: HCPCS

## 2021-04-21 PROCEDURE — 36415 COLL VENOUS BLD VENIPUNCTURE: CPT | Performed by: INTERNAL MEDICINE

## 2021-04-21 PROCEDURE — 85018 HEMOGLOBIN: CPT | Performed by: INTERNAL MEDICINE

## 2021-04-21 RX ORDER — WARFARIN SODIUM 10 MG/1
10 TABLET ORAL
Status: COMPLETED | OUTPATIENT
Start: 2021-04-21 | End: 2021-04-21

## 2021-04-21 RX ADMIN — VENLAFAXINE HYDROCHLORIDE 300 MG: 75 CAPSULE, EXTENDED RELEASE ORAL at 08:36

## 2021-04-21 RX ADMIN — PREGABALIN 100 MG: 100 CAPSULE ORAL at 16:23

## 2021-04-21 RX ADMIN — ASPIRIN 81 MG: 81 TABLET, DELAYED RELEASE ORAL at 08:36

## 2021-04-21 RX ADMIN — DONEPEZIL HYDROCHLORIDE 20 MG: 10 TABLET ORAL at 21:08

## 2021-04-21 RX ADMIN — AZELASTINE HYDROCHLORIDE 1 SPRAY: 137 SPRAY, METERED NASAL at 21:07

## 2021-04-21 RX ADMIN — MEMANTINE 10 MG: 10 TABLET ORAL at 08:38

## 2021-04-21 RX ADMIN — MEMANTINE 10 MG: 10 TABLET ORAL at 21:08

## 2021-04-21 RX ADMIN — MIRABEGRON 50 MG: 50 TABLET, FILM COATED, EXTENDED RELEASE ORAL at 17:27

## 2021-04-21 RX ADMIN — PROPAFENONE HYDROCHLORIDE 150 MG: 150 TABLET, FILM COATED ORAL at 13:43

## 2021-04-21 RX ADMIN — AZELASTINE HYDROCHLORIDE 1 SPRAY: 137 SPRAY, METERED NASAL at 08:34

## 2021-04-21 RX ADMIN — FOLIC ACID 5 MG: 1 TABLET ORAL at 13:43

## 2021-04-21 RX ADMIN — ENOXAPARIN SODIUM 105 MG: 120 INJECTION SUBCUTANEOUS at 21:08

## 2021-04-21 RX ADMIN — LEVOTHYROXINE SODIUM 150 MCG: 150 TABLET ORAL at 13:44

## 2021-04-21 RX ADMIN — PREGABALIN 100 MG: 100 CAPSULE ORAL at 08:36

## 2021-04-21 RX ADMIN — PROPAFENONE HYDROCHLORIDE 150 MG: 150 TABLET, FILM COATED ORAL at 05:36

## 2021-04-21 RX ADMIN — ENOXAPARIN SODIUM 105 MG: 120 INJECTION SUBCUTANEOUS at 08:37

## 2021-04-21 RX ADMIN — WARFARIN SODIUM 10 MG: 10 TABLET ORAL at 17:27

## 2021-04-21 RX ADMIN — ATORVASTATIN CALCIUM 40 MG: 40 TABLET, FILM COATED ORAL at 21:08

## 2021-04-21 RX ADMIN — PROPAFENONE HYDROCHLORIDE 150 MG: 150 TABLET, FILM COATED ORAL at 21:08

## 2021-04-21 RX ADMIN — LAMOTRIGINE 25 MG: 25 TABLET ORAL at 13:43

## 2021-04-21 RX ADMIN — PREGABALIN 100 MG: 100 CAPSULE ORAL at 21:08

## 2021-04-21 ASSESSMENT — ACTIVITIES OF DAILY LIVING (ADL)
ADLS_ACUITY_SCORE: 13

## 2021-04-21 NOTE — PROGRESS NOTES
UPDATE    Called patient's wife and updated.    - Reviewed LE US and INR results.    - Discussed starting ASA 81 mg/d.      Niles Duff PA-C

## 2021-04-21 NOTE — PROGRESS NOTES
[id number]: A&Ox4. VSS on RA. Denies pain. Regular diet well tolerated. Up independently. IV SL. 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 CMS intact. Discharge pending INR at therapeutic level. Plan for home with outpatient PT.

## 2021-04-21 NOTE — PROGRESS NOTES
[facility]    Hospitalist Progress Note    Assessment & Plan   Todd S Aschoff is a 65 year old male who was admitted on 4/18/2021.     Past medical history significant for Aortic valve Dz s/p mechanical valve replacement on chronic coumadin, Atrial fib, HLP, Hypothyroidism, Obesity, Dementia, MDD with anxiety, Overactive Bladder and Possible Seizure D/O, Recent history of CVA (1/2021) who was admitted to [facility] for acute CVA.      Acute CVA   Historic/recent CVA (1/22/2021)   Subtherapeutic INR  Patient presented with right extremity weakness.  *Head CT and CTA Head/Neck negative for hemorrhage or LVO.    *Brain MRI 4/18/2021 confirmed small acute infarcts scattered throughout the left greater than the right cerebral hemisphere, multiple old infarcts.  *Recent INR subtherapeutic (1.3) and was started on subcutaneous lovenox bridging and continued coumadin.    *LIPID: 254/43/164/233.    *Troponin: Negative x2.    *ECHO: Technically difficult study and limited.  EF of 55-60%, RV normal size and function.  Noted mech aortic valve that is poorly visualized.  Negative bubble study.  Similar findings when compared to previous ECHO 1/2021.    - Neurology consult appreciated:   --Discussed with Jimena Lin PA-C 4/20.     --Continue warfarin and bridging until therapeutic.     --Goal BP < 140/90.    --Appreciate Cardiology consult:     --Start ASA 81 mg/d.    - Telemetry.  - Regular diet.    - PT/OT/SLP.  - Continue subcutaneous Lovenox and coumadin (pharamcy consult to assist with management).   --Monitor INR (Goal 2.5-3.5).    - Stop simvastatin and start Lipitor 40 mg/d.    - Neuro checks every 2-4 hours.      Subtherapeutic INR  INR 4/14/21 of 1.3 and subcutaneous lovenox (105 mg BID) prescribed/started on the same day.  Stroke symptoms began 4/17-4/18.    Recent Labs   Lab 04/21/21  0845 04/20/21  0848 04/19/21  0931 04/18/21  1409   INR 1.97* 1.73* 1.70* 1.82*   - Continue  subcutaneous Lovenox and coumadin (pharamcy consult to assist with management).   --Monitor INR (Goal 2.5-3.5).     History of infected left lower extremity hematoma with cellulitis  S/p I&D (1/10, 1/26 with wound vac) and skin graft (2/3)  S/p evacuation of infected hematoma and split thickness skin graft 1/2021.  *Left LE Venous US negative for DVT and thin crescent shaped fluid collection along the left calf representing known hematoma.    - WOC consult requested for continued wound management.      Mechanical Aortic Valve  - Continue subcutaneous Lovenox and coumadin (pharamcy consult to assist with management).    Seizure D/O, potential  Follows with Neurology as an outpatient and during previous admission 1/2021 where incidental right frontal CVA found patient was also started on Lamictal 25 mg/d for potential seizure.    - Resumed on Lamictal 25 mg/d.      Atrial fib, chronic  Rate controlled and managed PTA with propafenone.  EKG with NSR.    - Telemetry.    - Resumed on PTA propafenone 150 mg every 8 hours.      HLP  Managed PTA with simvastatin.  Lipid panel completed and resulted as above.    - Stop simvastatin and start Lipitor 40 mg/d.      Hypothyroidism  - Resumed on PTA levothyroxine 150 mcg/d.      Right lateral rectus palsy  Chronic.  No interventions.      Dementia  - Resumed on PTA Aricept 20 mg at bedtime and Namenda 10 mg BID.      MDD with anxiety  - Resumed on PTA Effexor- mg/d.      Overactive Bladder  - Resumed on PTA mirabegron 50 mg/d.      Chronic Back Pain  - Resumed on PTA Lyrica 100 mg TID.      Obesity   Body mass index is 40.3 kg/m .  Increase in all-cause morbidity and mortality.   - Encourage weight loss.  - Follow up with PCP regarding ongoing management.      - Recommend sleep study as an outpatient.        Diet: Regular Diet Adult     DVT Prophylaxis: Enoxaparin (Lovenox) SQ, Warfarin and Pneumatic Compression Devices   Zimmer Catheter: not present  Code Status: Full Code      Disposition Plan    Expected discharge: 2 - 3 days, recommended to prior living arrangement once Neuro work-up completed, INR therapeutic and safe dispo plan.   Awaiting therapeutic INR as patient with acute stroke with subtherapeutic INR while on subcutaneous Lovenox.    Entered: Edison Duff PA-C 04/21/2021, 5:26 AM        The patient's care was discussed with the Patient.      The patient has been discussed with Dr. Blackwood, who agrees with the assessment and plan at this time.  Dr. Blackwood will evaluate the patient independently.      Niles Duff PA-C   [phone]    Interval History   Patient was resting in bed upon arrival and watching TV.  He denied fever, chills, chest pain or abdominal pain.  He has had no recurrence of right sided weakness.      Reviewed lower extremity US and recent issues with swelling and foot discoloration.  Discussed plan for starting ASA 81 mg/d and reviewed INR of 1.97.  Reviewed plan for continued monitoring of INR as still not therapeutic and will be receiving 10 mg of coumadin.      Patient informed that his wife would be called and updated later in the day.      -Data reviewed today: I reviewed all new labs and imaging results over the last 24 hours. I personally reviewed no images or EKG's today.    Physical Exam   Temp: 97.7  F (36.5  C) Temp src: Oral BP: (!) 144/89 Pulse: 70   Resp: 18 SpO2: 94 % O2 Device: None (Room air)      Vitals:    04/18/21 1425   Weight: 141.4 kg (311 lb 11.7 oz)     Vital Signs with Ranges  Temp:  [97.7  F (36.5  C)-98  F (36.7  C)] 98  F (36.7  C)  Pulse:  [67-74] 67  Resp:  [16-18] 18  BP: (108-133)/(59-91) 108/69  SpO2:  [92 %-97 %] 94 %  I/O last 3 completed shifts:  In: 480 [P.O.:480]  Out: 685 [Urine:685]      Constitutional: Awake, alert, cooperative.  Patient is in no distress.    ENT: NCAT, oral pharynx with MMM, tonsils without erythema or exudates.  Neck: Supple, symmetrical, trachea midline, no adenopathy.  Pulmonary: No  increased work of breathing, good air exchange, CTA bilaterally, no crackles or wheezing.  Cardiovascular: Reg rate rhythm with a mechanical click (Known aortic mech valve), normal S1 and S2, no S3 or S4, and no murmur noted.  GI: Normo-active bowel sounds, soft, non-distended, non-tender.  Skin/Integumen: Clear.  Neuro: CN II-XII grossly intact.  Psych:  Alert and oriented x 3. Normal affect.  Extremities: No lower extremity edema noted, and calves are non-TTP bilaterally. Left lower extremity ACE wrapped.        Medications     - MEDICATION INSTRUCTIONS -       - MEDICATION INSTRUCTIONS -       Warfarin Therapy Reminder         aspirin  81 mg Oral Daily     atorvastatin  40 mg Oral QPM     azelastine  1 spray Both Nostrils BID     donepezil  20 mg Oral At Bedtime     enoxaparin ANTICOAGULANT  105 mg Subcutaneous Q12H     folic acid  5 mg Oral Daily     lamoTRIgine  25 mg Oral Daily     levothyroxine  150 mcg Oral Daily     memantine  10 mg Oral BID     mirabegron  50 mg Oral Daily     pregabalin  100 mg Oral TID     propafenone  150 mg Oral Q8H RANGEL     sodium chloride (PF)  3 mL Intracatheter Q8H     venlafaxine  300 mg Oral Daily     warfarin ANTICOAGULANT  10 mg Oral ONCE at 18:00       Data   Recent Labs   Lab 04/21/21  0845 04/20/21  0848 04/19/21  0931 04/19/21  0305 04/18/21  1409 04/18/21  1409   WBC  --   --  6.7  --   --  7.6   HGB 14.5  --  14.9  --   --  14.5   MCV  --   --  91  --   --  91   PLT  --   --  226  --   --  220   INR 1.97* 1.73* 1.70*  --   --  1.82*    138 138  --   --  141   POTASSIUM 3.6 4.0 4.2  --    < > 3.9   CHLORIDE 106 105 104  --   --  105   CO2 27 31 33*  --   --  35*   BUN 18 22 20  --   --  21   CR 0.95 1.06 1.11  --   --  1.11   ANIONGAP 5 2* 1*  --   --  1*   CATERINA 8.5 8.9 9.0  --   --  8.7   * 103* 101*  --   --  117*   ALBUMIN 3.3*  --   --   --   --   --    PROTTOTAL 6.8  --   --   --   --   --    BILITOTAL 1.1  --   --   --   --   --    ALKPHOS 86  --   --   --    --   --    ALT 31  --   --   --   --   --    AST 21  --   --   --   --   --    TROPI  --   --   --  <0.015  --  <0.015    < > = values in this interval not displayed.       Recent Results (from the past 24 hour(s))   US Lower Extremity Venous Duplex Left    Narrative    ULTRASOUND VENOUS LOWER EXTREMITY UNILATERAL LEFT    4/20/2021 4:29 PM     HISTORY: Recent infected hematoma.  Reported increased swelling with  color changes    COMPARISON: None.    TECHNIQUE: Ultrasound gray scale, Color Doppler flow, and spectral  Doppler waveform analysis performed.    FINDINGS:   The left common femoral, superficial femoral, popliteal and  segmentally visualized calf veins are patent and fully compressible  and demonstrate normal venous Doppler flow. The visualized greater  saphenous vein is negative for thrombus.    The contralateral right common femoral vein is patent without deep  venous thrombosis.    Along the medial left calf is a crescent-shaped hypoechoic avascular  fluid collection measuring 9.0 x 4.6 x 1.2 cm.      Impression    IMPRESSION:   1.  No DVT demonstrated.   2.  Thin crescent shaped fluid collection along the left calf  representing the known hematoma.    RONA YING MD

## 2021-04-21 NOTE — CONSULTS
[facility]  WO Nurse Inpatient Wound Assessment     Reason for consultation: Evaluate and treat LLE      Assessment  LLE: healed wounds to left medial lower leg, post trama-related hematoma removal and split thickness skin graft in Jan 2021.  The skin remains fragile in areas but appears intact. There is an area of non-tender non-erythematous induration proximally that pt states is residual hematoma.  Left thigh donor sites are also healed but fragile.      Treatment Plan  LLE: daily:   1.  Cleanse thigh and lower leg with mild skin cleanser, dry, then apply Sween 24 cream  2.  Cover medial lower left leg with ABD pad and light ACE wrap for padding/protection of the fragile skin    Ok to shower    Follow-up with PCP on discharge prn     Orders Written  WOC Nurse follow-up plan: will sign off  Nursing to notify the Provider(s) and re-consult the WO Nurse if wound(s) deteriorates or new skin concern.    Patient History  According to provider note(s):  Past medical history significant for Aortic valve Dz s/p mechanical valve replacement on chronic coumadin, Atrial fib, HLP, Hypothyroidism, Obesity, Dementia, MDD with anxiety, Overactive Bladder and Possible Seizure D/O, Recent history of CVA (1/2021) who was admitted to [facility] for acute CVA.      Objective Data  Containment of urine/stool: Continent of bladder and Continent of bowel    Active Diet Order:  Orders Placed This Encounter      Regular Diet Adult    Output:   I/O last 3 completed shifts:  In: 840 [P.O.:840]  Out: -     Risk Assessment:   Sensory Perception: 4-->no impairment  Moisture: 4-->rarely moist  Activity: 3-->walks occasionally  Mobility: 3-->slightly limited  Nutrition: 4-->excellent  Friction and Shear: 2-->potential problem  Ascencion Score: 20                          Labs:   Recent Labs   Lab 04/21/21  0845 04/19/21  0931 04/19/21  0931   ALBUMIN 3.3*  --   --    HGB 14.5  --  14.9   INR 1.97*   < > 1.70*   WBC   --   --  6.7    < > = values in this interval not displayed.       Physical Exam  Skin inspection: focused LLE    Wound Location:  Left medial lower leg  Date of last photo:  4-21-21      Wound History: pt states he fell on a newly cleaned floor some months ago and developed a hematoma which was surgically debrided then Dr. Plascencia did a skin graft; had been followed by home care nursing   Measurements (length x width x depth, in cm): healed distal wound is 5 x 3cm; scattered smaller residual red/purple areas going up the leg, epidermis lightly intact  Wound Base: fragile pink and red epidermis  Palpation of the wound bed: normal except most proximal ecchymotic area has localized firm indurated area, slightly moveable, not easily depressable, but non-tender and not reddened  Periwound skin: intact and ecchymosis  Color: normal and consistent with surrounding tissue  Temperature: normal   Drainage: none  Pain: denies, but some itching    Left thigh 4-21-21    Donor sites for skin graft; healed except small thin scab in medial central patch; dark maroon intact raised tissue, slight firm blistery texture in areas; no drainage      Interventions  Current support surface: Standard  Atmos Air mattress  Current off-loading measures: Pillows  Visual inspection of wound(s) completed  Wound Care: completed by RN  Supplies: reviewed  Education provided: plan of care, wound progress and Off-loading pressure  Discussed plan of care with Patient and Nurse    Janett Santos RN, CWOCN

## 2021-04-21 NOTE — PLAN OF CARE
Pt here with multiple strokes. A&O times 4. Neuro's intact. VSS. Tele NSR. On a regular diet. Up independently in room.  Denies pain. Slept most of the night. Pt scoring green on the Aggression Stop Light Tool. Plan to discharge back to home, once INR therapeutic.

## 2021-04-21 NOTE — PLAN OF CARE
Pt here with small acute/subacute infarcts scattered throughout the left > right cerebral hemispheres. Pt A&O x4, VSS, on RA. LS clear. CMS intact, denies numbness/tingling. 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 Up Independently. Voiding adequately. +BS, passing flatus, BM this morning. Reg diet, good appetite. Takes pills whole with water. WOC assessed LLE surgical wound, see wound care ordered, showered this afternoon, dress and rewarp with ACE wrap. Skin graft locations on L thigh. INR 1.97, patient's therapeutic range is 2.5-3.5, bridging with Lovenox. Plans to discharge to home with OP PT when INR therapeutic. Pt scoring green on Aggression Stop Light Tool.

## 2021-04-22 ENCOUNTER — APPOINTMENT (OUTPATIENT)
Dept: OCCUPATIONAL THERAPY | Facility: CLINIC | Age: 66
End: 2021-04-22
Payer: COMMERCIAL

## 2021-04-22 LAB
CREAT SERPL-MCNC: 1 MG/DL (ref 0.66–1.25)
GFR SERPL CREATININE-BSD FRML MDRD: 79 ML/MIN/{1.73_M2}
INR PPP: 2.38 (ref 0.86–1.14)
PLATELET # BLD AUTO: 210 10E9/L (ref 150–450)

## 2021-04-22 PROCEDURE — 250N000013 HC RX MED GY IP 250 OP 250 PS 637: Performed by: INTERNAL MEDICINE

## 2021-04-22 PROCEDURE — 85049 AUTOMATED PLATELET COUNT: CPT | Performed by: INTERNAL MEDICINE

## 2021-04-22 PROCEDURE — 85610 PROTHROMBIN TIME: CPT | Performed by: INTERNAL MEDICINE

## 2021-04-22 PROCEDURE — 120N000001 HC R&B MED SURG/OB

## 2021-04-22 PROCEDURE — 250N000011 HC RX IP 250 OP 636: Performed by: INTERNAL MEDICINE

## 2021-04-22 PROCEDURE — 97535 SELF CARE MNGMENT TRAINING: CPT | Mod: GO | Performed by: OCCUPATIONAL THERAPIST

## 2021-04-22 PROCEDURE — 97110 THERAPEUTIC EXERCISES: CPT | Mod: GO | Performed by: OCCUPATIONAL THERAPIST

## 2021-04-22 PROCEDURE — 82565 ASSAY OF CREATININE: CPT | Performed by: INTERNAL MEDICINE

## 2021-04-22 PROCEDURE — 250N000013 HC RX MED GY IP 250 OP 250 PS 637: Performed by: PHYSICIAN ASSISTANT

## 2021-04-22 PROCEDURE — 99231 SBSQ HOSP IP/OBS SF/LOW 25: CPT | Performed by: PHYSICIAN ASSISTANT

## 2021-04-22 PROCEDURE — 36415 COLL VENOUS BLD VENIPUNCTURE: CPT | Performed by: INTERNAL MEDICINE

## 2021-04-22 RX ORDER — WARFARIN SODIUM 5 MG/1
5 TABLET ORAL
Status: COMPLETED | OUTPATIENT
Start: 2021-04-22 | End: 2021-04-22

## 2021-04-22 RX ADMIN — ENOXAPARIN SODIUM 105 MG: 120 INJECTION SUBCUTANEOUS at 08:49

## 2021-04-22 RX ADMIN — MEMANTINE 10 MG: 10 TABLET ORAL at 08:49

## 2021-04-22 RX ADMIN — PREGABALIN 100 MG: 100 CAPSULE ORAL at 15:35

## 2021-04-22 RX ADMIN — WARFARIN SODIUM 5 MG: 5 TABLET ORAL at 17:51

## 2021-04-22 RX ADMIN — MIRABEGRON 50 MG: 50 TABLET, FILM COATED, EXTENDED RELEASE ORAL at 17:51

## 2021-04-22 RX ADMIN — MEMANTINE 10 MG: 10 TABLET ORAL at 20:51

## 2021-04-22 RX ADMIN — PROPAFENONE HYDROCHLORIDE 150 MG: 150 TABLET, FILM COATED ORAL at 06:08

## 2021-04-22 RX ADMIN — PROPAFENONE HYDROCHLORIDE 150 MG: 150 TABLET, FILM COATED ORAL at 15:35

## 2021-04-22 RX ADMIN — ATORVASTATIN CALCIUM 40 MG: 40 TABLET, FILM COATED ORAL at 20:51

## 2021-04-22 RX ADMIN — AZELASTINE HYDROCHLORIDE 1 SPRAY: 137 SPRAY, METERED NASAL at 08:50

## 2021-04-22 RX ADMIN — DONEPEZIL HYDROCHLORIDE 20 MG: 10 TABLET ORAL at 20:54

## 2021-04-22 RX ADMIN — ENOXAPARIN SODIUM 105 MG: 120 INJECTION SUBCUTANEOUS at 20:49

## 2021-04-22 RX ADMIN — FOLIC ACID 5 MG: 1 TABLET ORAL at 15:35

## 2021-04-22 RX ADMIN — VENLAFAXINE HYDROCHLORIDE 300 MG: 75 CAPSULE, EXTENDED RELEASE ORAL at 08:49

## 2021-04-22 RX ADMIN — LEVOTHYROXINE SODIUM 150 MCG: 150 TABLET ORAL at 15:35

## 2021-04-22 RX ADMIN — PREGABALIN 100 MG: 100 CAPSULE ORAL at 08:49

## 2021-04-22 RX ADMIN — PREGABALIN 100 MG: 100 CAPSULE ORAL at 20:55

## 2021-04-22 RX ADMIN — ASPIRIN 81 MG: 81 TABLET, DELAYED RELEASE ORAL at 08:49

## 2021-04-22 RX ADMIN — LAMOTRIGINE 25 MG: 25 TABLET ORAL at 15:35

## 2021-04-22 RX ADMIN — PROPAFENONE HYDROCHLORIDE 150 MG: 150 TABLET, FILM COATED ORAL at 20:55

## 2021-04-22 ASSESSMENT — ACTIVITIES OF DAILY LIVING (ADL)
ADLS_ACUITY_SCORE: 13

## 2021-04-22 NOTE — PLAN OF CARE
Pt here with scattered infarcts. A&Ox4. Neuros intact except slight WFD, right eye does not track-baseline. VSS. Regular diet, thin  liquids. Takes pills whole with water. Up independently. Denies pain. RLE wound in ace wrap-changed today. Voiding adequately. Saline locked. Pt scoring green on the Aggression Stop Light Tool. Plan to discharge when INR therapeutic.

## 2021-04-22 NOTE — PLAN OF CARE
Pt here with small scattered acute infarcts, L greater than R. A&O x4. Neuros intact, R eye does not track together with L eye. VSS. Regular diet with thin liquids. Takes pills whole. Up independently. Denies pain. Pt scoring green on the Aggression Stop Light Tool. Plan for discharge today. INR goal: 2.5-3.5

## 2021-04-22 NOTE — PROGRESS NOTES
Chair alarm going off in patient room , when arrived to room patient was on knees leaning on bed, stated he leaned forward to use urinal and feel forward off chair, patient states he hit his head in tray table. Patient ceiling lifted back to bed and house officer was called.

## 2021-04-22 NOTE — PROGRESS NOTES
UPDATE/ADDENDUM    Called patient's wife and updated regarding plan and INR results.      Niles Duff PA-C

## 2021-04-22 NOTE — PROVIDER NOTIFICATION
MD Notification    Notified Person: MD    Notified Person Name: Dr. Curtis    Notification Date/Time: 04/22/2021 01:00    Notification Interaction:    Purpose of Notification: Loss of IV access. Pt on Tele, awaiting INR. Plan for discharge today.    Orders Received: May keep IV out.    Comments:

## 2021-04-22 NOTE — PROGRESS NOTES
[facility]    Hospitalist Progress Note    Assessment & Plan   Todd S Aschoff is a 65 year old male who was admitted on 4/18/2021.     Past medical history significant for Aortic valve Dz s/p mechanical valve replacement on chronic coumadin, Atrial fib, HLP, Hypothyroidism, Obesity, Dementia, MDD with anxiety, Overactive Bladder and Possible Seizure D/O, Recent history of CVA (1/2021) who was admitted to [facility] for acute CVA.      Acute CVA   Historic/recent CVA (1/22/2021)   Patient presented with right extremity weakness.  *Head CT and CTA Head/Neck negative for hemorrhage or LVO.    *Brain MRI 4/18/2021 confirmed small acute infarcts scattered throughout the left greater than the right cerebral hemisphere, multiple old infarcts.  *Recent INR subtherapeutic (1.3) and was started on subcutaneous lovenox bridging and continued coumadin.    *LIPID: 254/43/164/233.    *Troponin: Negative x2.    *ECHO: Technically difficult study and limited.  EF of 55-60%, RV normal size and function.  Noted mech aortic valve that is poorly visualized.  Negative bubble study.  Similar findings when compared to previous ECHO 1/2021.    - Neurology consult appreciated:   --Continue warfarin and bridging until therapeutic.     --Goal BP < 140/90.    --Appreciate Cardiology consult:     --Start ASA 81 mg/d.    - Telemetry.  - Regular diet.    - PT/OT/SLP.  - Continue subcutaneous Lovenox and coumadin (pharamcy consult to assist with management).   --Monitor INR (Goal 2.5-3.5).    - Stop simvastatin and start Lipitor 40 mg/d.    - Neuro checks every 2-4 hours.      Subtherapeutic INR  INR 4/14/21 of 1.3 and subcutaneous lovenox (105 mg BID) prescribed/started on the same day.  Stroke symptoms began 4/17-4/18.    Recent Labs   Lab 04/22/21  0758 04/21/21  0845 04/20/21  0848 04/19/21  0931 04/18/21  1409   INR 2.38* 1.97* 1.73* 1.70* 1.82*   - Continue subcutaneous Lovenox and coumadin (pharamcy consult to  assist with management).   --Monitor INR (Goal 2.5-3.5).     History of infected left lower extremity hematoma with cellulitis  S/p I&D (1/10, 1/26 with wound vac) and skin graft (2/3)  S/p evacuation of infected hematoma and split thickness skin graft 1/2021.  *Left LE Venous US negative for DVT and thin crescent shaped fluid collection along the left calf representing known hematoma.    - WOC consult requested for continued wound management.      Mechanical Aortic Valve  - Continue subcutaneous Lovenox and coumadin (pharamcy consult to assist with management).    Seizure D/O, potential  Follows with Neurology as an outpatient and during previous admission 1/2021 where incidental right frontal CVA found patient was also started on Lamictal 25 mg/d for potential seizure.    - Resumed on Lamictal 25 mg/d.      Atrial fib, chronic  Rate controlled and managed PTA with propafenone.  EKG with NSR.    - Telemetry.    - Resumed on PTA propafenone 150 mg every 8 hours.      HLP  Managed PTA with simvastatin.  Lipid panel completed and resulted as above.    - Stop simvastatin and start Lipitor 40 mg/d.      Hypothyroidism  - Resumed on PTA levothyroxine 150 mcg/d.      Right lateral rectus palsy  Chronic.  No interventions.      Dementia  - Resumed on PTA Aricept 20 mg at bedtime and Namenda 10 mg BID.      MDD with anxiety  - Resumed on PTA Effexor- mg/d.      Overactive Bladder  - Resumed on PTA mirabegron 50 mg/d.      Chronic Back Pain  - Resumed on PTA Lyrica 100 mg TID.      Obesity   Body mass index is 40.3 kg/m .  Increase in all-cause morbidity and mortality.   - Encourage weight loss.  - Follow up with PCP regarding ongoing management.      - Recommend sleep study as an outpatient.        Diet: Regular Diet Adult     DVT Prophylaxis: Enoxaparin (Lovenox) SQ, Warfarin and Pneumatic Compression Devices   Zimmer Catheter: not present  Code Status: Full Code     Disposition Plan    Expected discharge: 1-2 days,  recommended to prior living arrangement once INR therapeutic and home care established.   Awaiting therapeutic INR as patient with acute stroke with subtherapeutic INR while on subcutaneous Lovenox.    Entered: Edison Duff PA-C 04/22/2021, 10:58 AM        The patient's care was discussed with the Patient.      The patient has been discussed with Dr. Blackwood, who agrees with the assessment and plan at this time.  Dr. Blackwood will evaluate the patient independently.      Niles Duff PA-C   [phone]    Interval History   Patient was in bed upon arrival and watching TV.  He denied fever, chills, chest pain, shortness of breath or abdominal pain.       Reviewed INR results (2.39) and discussed likely dose of coumadin.  Discussed subtherapeutic INR and then asked if his protein drinks/shakes could be the reason for the drop in INR.  He indicated that they might have large amount of vitamin K.  Advised patient to look up the brand he is taking at home and check vitamin K amount.      Informed patient he would likely return home tomorrow or Saturday.  He was also informed that his wife will be called later today and updated.       -Data reviewed today: I reviewed all new labs and imaging results over the last 24 hours. I personally reviewed no images or EKG's today.    Physical Exam   Temp: 97.7  F (36.5  C) Temp src: Oral BP: (!) 152/95 Pulse: 71   Resp: 18 SpO2: 95 % O2 Device: None (Room air)        Vitals:    04/18/21 1425   Weight: 141.4 kg (311 lb 11.7 oz)     Vital Signs with Ranges  Temp:  [97.7  F (36.5  C)-98  F (36.7  C)] 97.7  F (36.5  C)  Pulse:  [69-77] 74  Resp:  [18] 18  BP: (117-144)/(71-89) 119/84  SpO2:  [92 %-98 %] 96 %  I/O last 3 completed shifts:  In: 840 [P.O.:840]  Out: 600 [Urine:600]      Constitutional: Patient is in no distress and is awake, alert, and cooperative.   ENT: NCAT, oral pharynx with moist membranes, tonsils without erythema or exudates.  Neck: Supple, symmetrical,  trachea midline, no adenopathy.  Pulmonary: No increased work of breathing, good air exchange, CTA bilaterally, no crackles or wheezing.  Cardiovascular: R/R/R.  Mechanical click (Known aortic mech valve) appreciated, normal S1 and S2, no S3 or S4, and no murmur noted.  GI: Active bowel sounds, soft, non-distended, non-tender.  Skin/Integumen: Clear.  Neuro: CN II-XII grossly intact.  Psych:  Alert and oriented x 3. Normal affect.  Extremities: No lower extremity edema noted, and calves are non-TTP bilaterally. Left lower extremity ACE wrapped.        Medications     - MEDICATION INSTRUCTIONS -       - MEDICATION INSTRUCTIONS -       Warfarin Therapy Reminder         aspirin  81 mg Oral Daily     atorvastatin  40 mg Oral QPM     azelastine  1 spray Both Nostrils BID     donepezil  20 mg Oral At Bedtime     enoxaparin ANTICOAGULANT  105 mg Subcutaneous Q12H     folic acid  5 mg Oral Daily     lamoTRIgine  25 mg Oral Daily     levothyroxine  150 mcg Oral Daily     memantine  10 mg Oral BID     mirabegron  50 mg Oral Daily     pregabalin  100 mg Oral TID     propafenone  150 mg Oral Q8H RANGEL     sodium chloride (PF)  3 mL Intracatheter Q8H     venlafaxine  300 mg Oral Daily       Data   Recent Labs   Lab 04/22/21  0758 04/21/21  0845 04/20/21  0848 04/19/21  0931 04/19/21  0305 04/18/21  1409 04/18/21  1409   WBC  --   --   --  6.7  --   --  7.6   HGB  --  14.5  --  14.9  --   --  14.5   MCV  --   --   --  91  --   --  91     --   --  226  --   --  220   INR 2.38* 1.97* 1.73* 1.70*  --   --  1.82*   NA  --  138 138 138  --   --  141   POTASSIUM  --  3.6 4.0 4.2  --    < > 3.9   CHLORIDE  --  106 105 104  --   --  105   CO2  --  27 31 33*  --   --  35*   BUN  --  18 22 20  --   --  21   CR 1.00 0.95 1.06 1.11  --   --  1.11   ANIONGAP  --  5 2* 1*  --   --  1*   CATERINA  --  8.5 8.9 9.0  --   --  8.7   GLC  --  144* 103* 101*  --   --  117*   ALBUMIN  --  3.3*  --   --   --   --   --    PROTTOTAL  --  6.8  --   --    --   --   --    BILITOTAL  --  1.1  --   --   --   --   --    ALKPHOS  --  86  --   --   --   --   --    ALT  --  31  --   --   --   --   --    AST  --  21  --   --   --   --   --    TROPI  --   --   --   --  <0.015  --  <0.015    < > = values in this interval not displayed.       No results found for this or any previous visit (from the past 24 hour(s)).

## 2021-04-22 NOTE — DISCHARGE INSTRUCTIONS
A follow-up appointment was scheduled for you with Dr. Ingram [see above].  It is very important to go to it--bring these papers, an ID, and your insurance card with you.  At the visit, talk about your hospital stay.  Tell your doctor how you feel.  Show your new list of medications.  Your doctor will update records, make sure you are still doing OK, and decide if any tests or medication changes are needed.       Please follow up with neurology in 4-8 weeks. You may call any of the following neurology clinics for an appointment or a clinic of your choice. Tell them you were recently hospitalized and need follow up as recommended at discharge.    1. [facility]   [address] , [address]   [place], [place] [address] [phone]  2. [facility]   [address], [address]   [place], [place] [address] [phone]  3. [facility]   [address] [place], [place] [id number] [phone]  4. [facility]   [address] [place] [address]   [phone]      Neurology also recommends follow-up {IMAGIN} be completed in *** {WEEKS OR MONTHS:295431}      Your risk factors for stroke or TIA (transient ischemic attack):    Your Risk Factors Your Results Normal Ranges   High blood pressure BP Readings from Last 1 Encounters:   21 138/84    Less than 120/80   Cholesterol              Total Lab Results   Component Value Date    CHOL 254 2021      Less than 150    Triglycerides   Lab Results   Component Value Date    TRIG 233 2021    Less than 150   LDL Lab Results   Component Value Date     2021       Less than 70   HDL Lab Results   Component Value Date    HDL 43 2021            Greater than 40 (men)  Greater than 50 (women)   Diabetes Recent Labs   Lab 21  0453   GLC 85    Fasting blood glucose    Smoking/tobacco use  Quit smoking and  tobacco   Overweight  Lose 1-2 pounds a week   Lack of exercise  30 minutes moderate activity each day   Other risk factors include carotid (neck) artery disease, atrial fibrillation and stress. You may be on new medicine to treat high blood pressure, cholesterol, diabetes or atrial fibrillation.    Understanding Stroke Booklet given to patient. Please refer to booklet for further information.    Stroke warning signs and symptoms - CALL 911 right away for:  - Sudden numbness or weakness in the face, arm or leg (often on one side of the body).  - Sudden confusion or trouble understanding what is going on.  - Sudden blurred or decreased vision in one or both eyes.  - Sudden trouble speaking, loss of balance, dizziness or problems with coordination.  - Sudden, severe headache for no reason.  - Fainting or seizures.  - Symptoms may go away then come back suddenly.      Follow up with INR clinic. Please check an INR in 1-2 days

## 2021-04-22 NOTE — CONSULTS
Care Management Initial Consult    General Information  Assessment completed with: Patient,    Type of CM/SW Visit: Offer D/C Planning    Primary Care Provider verified and updated as needed: Yes   Readmission within the last 30 days: no previous admission in last 30 days      Reason for Consult: discharge planning  Advance Care Planning: Advance Care Planning Reviewed: no concerns identified          Communication Assessment  Patient's communication style: spoken language (English or Bilingual)    Hearing Difficulty or Deaf: no   Wear Glasses or Blind: yes    Cognitive  Cognitive/Neuro/Behavioral: WDL  Level of Consciousness: alert  Arousal Level: opens eyes spontaneously  Orientation: oriented x 4  Mood/Behavior: calm, cooperative  Best Language: 0 - No aphasia  Speech: clear, spontaneous, logical    Living Environment:   People in home: child(morgan), adult, grandchild(morgan), spouse     Current living Arrangements: house      Able to return to prior arrangements: yes       Family/Social Support:  Care provided by: self  Provides care for: no one     Wife, Children          Description of Support System: Supportive         Current Resources:   Patient receiving home care services: Yes  Skilled Home Care Services: Skilled Nursing  Community Resources:    Equipment currently used at home: cane, straight, walker, rolling  Supplies currently used at home:      Employment/Financial:  Employment Status:          Financial Concerns: No concerns identified       Lifestyle & Psychosocial Needs:  Lifestyle     Physical activity     Days per week: Not on file     Minutes per session: Not on file     Stress: Only a little     Social Needs     Financial resource strain: Not hard at all     Food insecurity     Worry: Never true     Inability: Never true     Transportation needs     Medical: No     Non-medical: No     Socioeconomic History     Marital status:      Spouse name: Not on file     Number of children: 3     Years  of education: Not on file     Highest education level: High school graduate   Occupational History     Employer: DISABLED     Comment: Previous , disabled since ~2013   Relationships     Social connections     Talks on phone: Once a week     Gets together: Once a week     Attends Spiritism service: More than 4 times per year     Active member of club or organization: No     Attends meetings of clubs or organizations: Never     Relationship status:      Intimate partner violence     Fear of current or ex partner: No     Emotionally abused: No     Physically abused: No     Forced sexual activity: No     Tobacco Use     Smoking status: Never Smoker     Smokeless tobacco: Never Used   Substance and Sexual Activity     Alcohol use: No     Comment: Stopped drinking alcohol ~2009     Drug use: No     Sexual activity: Not Currently       Functional Status:  Prior to admission patient needed assistance: no       Mental Health Status:    No concerns at this time      Chemical Dependency Status:      No concerns at this time.    Values/Beliefs:  Spiritual, Cultural Beliefs, Adventism Practices, Values that affect care:  no               Additional Information:  Patient admitted for acute CVA and subtherapeutic INR.  Met with patient to discuss discharge planning.  Patient lives with his ex-spouse, daughter, S-I-L and 4 grandchildren. Prior to admission he had home care through [facility].  He stated he recently stopped OT and PT but still had nurse coming 2x per week.  He stated the HC nurse lorene the INRs, otherwise he goes to the [facility] clinic.  He states his wife recently broke her ankle and is not able to drive.  He states his daughter is in [place] but is expected to return home this weekend.  He states his S-I-L will pick him up when ready to discharge.  He is agreeable to resuming PT services if recommended and ordered at discharge. Patient understands he will not discharge until INR therapeutic and  anticipates this to be in 1-2 days.    Task sent to [facility] to reschedule PCP appointment.  Updated [facility]CFV of anticipated discharge date.    Nolvia Perez RN, BSN, PHN  Inpatient Care Coordination    Phone: [phone]

## 2021-04-23 ENCOUNTER — APPOINTMENT (OUTPATIENT)
Dept: PHYSICAL THERAPY | Facility: CLINIC | Age: 66
End: 2021-04-23
Payer: COMMERCIAL

## 2021-04-23 ENCOUNTER — APPOINTMENT (OUTPATIENT)
Dept: OCCUPATIONAL THERAPY | Facility: CLINIC | Age: 66
End: 2021-04-23
Payer: COMMERCIAL

## 2021-04-23 LAB — INR PPP: 2.33 (ref 0.86–1.14)

## 2021-04-23 PROCEDURE — 250N000011 HC RX IP 250 OP 636: Performed by: INTERNAL MEDICINE

## 2021-04-23 PROCEDURE — 36415 COLL VENOUS BLD VENIPUNCTURE: CPT | Performed by: INTERNAL MEDICINE

## 2021-04-23 PROCEDURE — 99231 SBSQ HOSP IP/OBS SF/LOW 25: CPT | Performed by: PHYSICIAN ASSISTANT

## 2021-04-23 PROCEDURE — 97110 THERAPEUTIC EXERCISES: CPT | Mod: GO | Performed by: OCCUPATIONAL THERAPIST

## 2021-04-23 PROCEDURE — 97110 THERAPEUTIC EXERCISES: CPT | Mod: GP | Performed by: PHYSICAL THERAPIST

## 2021-04-23 PROCEDURE — 250N000013 HC RX MED GY IP 250 OP 250 PS 637: Performed by: PHYSICIAN ASSISTANT

## 2021-04-23 PROCEDURE — 85610 PROTHROMBIN TIME: CPT | Performed by: INTERNAL MEDICINE

## 2021-04-23 PROCEDURE — 97535 SELF CARE MNGMENT TRAINING: CPT | Mod: GO | Performed by: OCCUPATIONAL THERAPIST

## 2021-04-23 PROCEDURE — 250N000013 HC RX MED GY IP 250 OP 250 PS 637: Performed by: INTERNAL MEDICINE

## 2021-04-23 PROCEDURE — 120N000001 HC R&B MED SURG/OB

## 2021-04-23 RX ORDER — WARFARIN SODIUM 7.5 MG/1
7.5 TABLET ORAL
Status: COMPLETED | OUTPATIENT
Start: 2021-04-23 | End: 2021-04-23

## 2021-04-23 RX ORDER — WARFARIN SODIUM 5 MG/1
5 TABLET ORAL
Status: DISCONTINUED | OUTPATIENT
Start: 2021-04-23 | End: 2021-04-23

## 2021-04-23 RX ADMIN — MIRABEGRON 50 MG: 50 TABLET, FILM COATED, EXTENDED RELEASE ORAL at 18:52

## 2021-04-23 RX ADMIN — FOLIC ACID 5 MG: 1 TABLET ORAL at 14:43

## 2021-04-23 RX ADMIN — AZELASTINE HYDROCHLORIDE 1 SPRAY: 137 SPRAY, METERED NASAL at 20:16

## 2021-04-23 RX ADMIN — AZELASTINE HYDROCHLORIDE 1 SPRAY: 137 SPRAY, METERED NASAL at 08:38

## 2021-04-23 RX ADMIN — PREGABALIN 100 MG: 100 CAPSULE ORAL at 08:31

## 2021-04-23 RX ADMIN — MEMANTINE 10 MG: 10 TABLET ORAL at 08:31

## 2021-04-23 RX ADMIN — WARFARIN SODIUM 7.5 MG: 7.5 TABLET ORAL at 18:52

## 2021-04-23 RX ADMIN — LEVOTHYROXINE SODIUM 150 MCG: 150 TABLET ORAL at 14:43

## 2021-04-23 RX ADMIN — ENOXAPARIN SODIUM 105 MG: 120 INJECTION SUBCUTANEOUS at 20:11

## 2021-04-23 RX ADMIN — PROPAFENONE HYDROCHLORIDE 150 MG: 150 TABLET, FILM COATED ORAL at 14:43

## 2021-04-23 RX ADMIN — PREGABALIN 100 MG: 100 CAPSULE ORAL at 16:12

## 2021-04-23 RX ADMIN — DONEPEZIL HYDROCHLORIDE 20 MG: 10 TABLET ORAL at 23:02

## 2021-04-23 RX ADMIN — PREGABALIN 100 MG: 100 CAPSULE ORAL at 23:01

## 2021-04-23 RX ADMIN — PROPAFENONE HYDROCHLORIDE 150 MG: 150 TABLET, FILM COATED ORAL at 05:53

## 2021-04-23 RX ADMIN — PROPAFENONE HYDROCHLORIDE 150 MG: 150 TABLET, FILM COATED ORAL at 23:02

## 2021-04-23 RX ADMIN — ENOXAPARIN SODIUM 105 MG: 120 INJECTION SUBCUTANEOUS at 08:31

## 2021-04-23 RX ADMIN — VENLAFAXINE HYDROCHLORIDE 300 MG: 75 CAPSULE, EXTENDED RELEASE ORAL at 08:31

## 2021-04-23 RX ADMIN — ASPIRIN 81 MG: 81 TABLET, DELAYED RELEASE ORAL at 08:31

## 2021-04-23 RX ADMIN — MEMANTINE 10 MG: 10 TABLET ORAL at 20:10

## 2021-04-23 RX ADMIN — ATORVASTATIN CALCIUM 40 MG: 40 TABLET, FILM COATED ORAL at 20:10

## 2021-04-23 RX ADMIN — LAMOTRIGINE 25 MG: 25 TABLET ORAL at 14:43

## 2021-04-23 ASSESSMENT — ACTIVITIES OF DAILY LIVING (ADL)
ADLS_ACUITY_SCORE: 13

## 2021-04-23 NOTE — PLAN OF CARE
Pt here with acute/subacute CVAs L>R. A&O x4. Neuros with baseline L eye nystagmus and R eye does not track. VSS. Tele NSR. Regular diet, thin liquids. Takes pills whole. Up Independently. Denies pain. Pt scoring green on the Aggression Stop Light Tool. Discharge pending therapeutic INR.

## 2021-04-23 NOTE — PLAN OF CARE
Pt is here with CVA. A&Ox4. Neuros intact. Baseline L eye nystagmus and R eye does not track R. VSS. Tele. Up independently. Regular diet. Voiding without difficulty. Denies pain. LLE wound CDI. Pt scoring green on the aggression stoplight tool. Discharge pending INR. Continue to monitor.

## 2021-04-23 NOTE — PROGRESS NOTES
Patient is A/O x 4, vss, a-febrile, denies pain, neuros are intact, left eye nystagmus, right eye not tracking baseline per patient, tele NSR, up independently in the room, dressing left lower extremity CDI, INR 2.33, sub-therapeutic for discharge, continue to monitor

## 2021-04-23 NOTE — PLAN OF CARE
Pt remain neurologically intact, stable vitals, denies pain/discomfort, great appetite. INR today  2.38. Pt to discharge home once INR is >2.5 x2 days. Left LE wound care completed.

## 2021-04-23 NOTE — PROGRESS NOTES
[facility]    Hospitalist Progress Note    Assessment & Plan   Todd S Aschoff is a 65 year old male who was admitted on 4/18/2021.     Past medical history significant for Aortic valve Dz s/p mechanical valve replacement on chronic coumadin, Atrial fib, HLP, Hypothyroidism, Obesity, Dementia, MDD with anxiety, Overactive Bladder and Possible Seizure D/O, Recent history of CVA (1/2021) who was admitted to [facility] for acute CVA.      Acute CVA   Historic/recent CVA (1/22/2021)   Patient presented with right extremity weakness.  *Head CT and CTA Head/Neck negative for hemorrhage or LVO.    *Brain MRI 4/18/2021 confirmed small acute infarcts scattered throughout the left greater than the right cerebral hemisphere, multiple old infarcts.  *Recent INR subtherapeutic (1.3) and was started on subcutaneous lovenox bridging and continued coumadin.    *LIPID: 254/43/164/233.    *Troponin: Negative x2.    *ECHO: Technically difficult study and limited.  EF of 55-60%, RV normal size and function.  Noted mech aortic valve that is poorly visualized.  Negative bubble study.  Similar findings when compared to previous ECHO 1/2021.    - Neurology consult appreciated:   --Continue warfarin and bridging until therapeutic.     --Goal BP < 140/90.    --Appreciate Cardiology consult:     --Start ASA 81 mg/d.    - Telemetry.  - Regular diet.    - PT/OT/SLP.  - Continue subcutaneous Lovenox and coumadin (pharamcy consult to assist with management).   --Monitor INR (Goal 2.5-3.5).    - Stop simvastatin and start Lipitor 40 mg/d.    - Neuro checks every 4 hours.      Subtherapeutic INR  INR 4/14/21 of 1.3 and subcutaneous lovenox (105 mg BID) prescribed/started on the same day.  Stroke symptoms began 4/17-4/18.    Recent Labs   Lab 04/23/21  0815 04/22/21  0758 04/21/21  0845 04/20/21  0848 04/19/21  0931 04/18/21  1409   INR 2.33* 2.38* 1.97* 1.73* 1.70* 1.82*   - Continue subcutaneous Lovenox and coumadin  (pharamcy consult to assist with management).   --Monitor INR (Goal 2.5-3.5).     History of infected left lower extremity hematoma with cellulitis  S/p I&D (1/10, 1/26 with wound vac) and skin graft (2/3)  S/p evacuation of infected hematoma and split thickness skin graft 1/2021.  *Left LE Venous US negative for DVT and thin crescent shaped fluid collection along the left calf representing known hematoma.    - WOC consult requested for continued wound management.      Mechanical Aortic Valve  - Continue subcutaneous Lovenox and coumadin (pharamcy consult to assist with management).    Seizure D/O, potential  Follows with Neurology as an outpatient and during previous admission 1/2021 where incidental right frontal CVA found patient was also started on Lamictal 25 mg/d for potential seizure.    - Resumed on Lamictal 25 mg/d.      Atrial fib, chronic  Rate controlled and managed PTA with propafenone.  EKG with NSR.    - Telemetry.    - Resumed on PTA propafenone 150 mg every 8 hours.      HLP  Managed PTA with simvastatin.  Lipid panel completed and resulted as above.    - Stop simvastatin and start Lipitor 40 mg/d.      Hypothyroidism  - Resumed on PTA levothyroxine 150 mcg/d.      Right lateral rectus palsy  Chronic.  No interventions.      Dementia  - Resumed on PTA Aricept 20 mg at bedtime and Namenda 10 mg BID.      MDD with anxiety  - Resumed on PTA Effexor- mg/d.      Overactive Bladder  - Resumed on PTA mirabegron 50 mg/d.      Chronic Back Pain  - Resumed on PTA Lyrica 100 mg TID.      Obesity   Body mass index is 40.3 kg/m .  Increase in all-cause morbidity and mortality.   - Encourage weight loss.  - Follow up with PCP regarding ongoing management.      - Recommend sleep study as an outpatient.        Diet: Regular Diet Adult     DVT Prophylaxis: Enoxaparin (Lovenox) SQ, Warfarin and Pneumatic Compression Devices   Zimmer Catheter: not present  Code Status: Full Code     Disposition Plan    Expected  discharge: Hopefully, Monday, recommended to prior living arrangement once 2 subsequent therapuetic INR (>2.5). Awaiting therapeutic INR as patient with acute stroke with subtherapeutic INR while on subcutaneous Lovenox.    Entered: Edison Duff PA-C 04/23/2021, 2:24 PM        The patient's care was discussed with the Patient and Patient's Family.      The patient has been discussed with Dr. Blcakwood, who agrees with the assessment and plan at this time.  Dr. Blackwood will evaluate the patient independently.      Niles Duff PA-C   [phone]    Interval History   Patient was seated at the edge of the bed and ordering his lunch.  He denied fever, chills, chest pain, shortness of breath or abdominal pain.  He showed skin graft site of the left upper thigh with blister development which he thinks occurred because of lotion.      We reviewed INR results (2.33) and discussed plan for coumadin dosage tonight.  He is requesting 7.5-10 mg tonight to ensure his INR elevates.  We also talked about needing 2 consecutive days with therapeutic INR (>2.5) before he can return home.      Patient's wife, Paul, was called and updated on the INR results.  Discussed plan and talked about blister formation.  She indicated this has occurred before and managed with Aquaphor and gauze covering.  This was relayed to the nursing staff.      -Data reviewed today: I reviewed all new labs and imaging results over the last 24 hours. I personally reviewed no images or EKG's today.    Physical Exam   Temp: 97.9  F (36.6  C) Temp src: Oral BP: 135/89 Pulse: 75   Resp: 18 SpO2: 96 % O2 Device: None (Room air)        Vitals:    04/18/21 1425   Weight: 141.4 kg (311 lb 11.7 oz)     Vital Signs with Ranges  Temp:  [97.5  F (36.4  C)-98  F (36.7  C)] 97.9  F (36.6  C)  Pulse:  [68-77] 75  Resp:  [16-18] 18  BP: (118-152)/() 135/89  SpO2:  [93 %-96 %] 96 %  I/O last 3 completed shifts:  In: 1260 [P.O.:1260]  Out: -       Constitutional:  Patient is awake, alert, and cooperative and talking on the phone.  He is in no distress.    ENT: NCAT, oral pharynx with MMM, tonsils without erythema or exudates.  EYES: Chronic right lateral rectus palsy  Neck: Supple, symmetrical, trachea midline, no adenopathy.  Pulmonary: No increased work of breathing, good air exchange, CTA bilaterally, no crackles or wheezing.  Cardiovascular: Reg rate and rhtyhm.  Mechanical click (known aortic mech valve) appreciated, normal S1 and S2, no S3 or S4, and no murmur noted.  GI: Bowel sounds present, soft, non-distended, non-tender. Obese.    Skin/Integumen: Left upper thigh with recent skin graft wound and developing blister.    Neuro: CN II-XII grossly intact.  Patient moving all 4 extremities without difficulty.  Speech is fluid.    Psych:  Alert and oriented x 3. Normal affect.  Extremities: No lower extremity edema noted, and calves are non-TTP bilaterally. Left lower extremity ACE wrapped.        Medications     - MEDICATION INSTRUCTIONS -       - MEDICATION INSTRUCTIONS -       Warfarin Therapy Reminder         aspirin  81 mg Oral Daily     atorvastatin  40 mg Oral QPM     azelastine  1 spray Both Nostrils BID     donepezil  20 mg Oral At Bedtime     enoxaparin ANTICOAGULANT  105 mg Subcutaneous Q12H     folic acid  5 mg Oral Daily     lamoTRIgine  25 mg Oral Daily     levothyroxine  150 mcg Oral Daily     memantine  10 mg Oral BID     mirabegron  50 mg Oral Daily     pregabalin  100 mg Oral TID     propafenone  150 mg Oral Q8H RANGEL     venlafaxine  300 mg Oral Daily     warfarin ANTICOAGULANT  7.5 mg Oral ONCE at 18:00       Data   Recent Labs   Lab 04/23/21  0815 04/22/21  0758 04/21/21  0845 04/20/21  0848 04/19/21  0931 04/19/21  0305 04/18/21  1409 04/18/21  1409   WBC  --   --   --   --  6.7  --   --  7.6   HGB  --   --  14.5  --  14.9  --   --  14.5   MCV  --   --   --   --  91  --   --  91   PLT  --  210  --   --  226  --   --  220   INR 2.33* 2.38* 1.97* 1.73*  1.70*  --   --  1.82*   NA  --   --  138 138 138  --   --  141   POTASSIUM  --   --  3.6 4.0 4.2  --    < > 3.9   CHLORIDE  --   --  106 105 104  --   --  105   CO2  --   --  27 31 33*  --   --  35*   BUN  --   --  18 22 20  --   --  21   CR  --  1.00 0.95 1.06 1.11  --   --  1.11   ANIONGAP  --   --  5 2* 1*  --   --  1*   CATERINA  --   --  8.5 8.9 9.0  --   --  8.7   GLC  --   --  144* 103* 101*  --   --  117*   ALBUMIN  --   --  3.3*  --   --   --   --   --    PROTTOTAL  --   --  6.8  --   --   --   --   --    BILITOTAL  --   --  1.1  --   --   --   --   --    ALKPHOS  --   --  86  --   --   --   --   --    ALT  --   --  31  --   --   --   --   --    AST  --   --  21  --   --   --   --   --    TROPI  --   --   --   --   --  <0.015  --  <0.015    < > = values in this interval not displayed.       No results found for this or any previous visit (from the past 24 hour(s)).

## 2021-04-24 ENCOUNTER — APPOINTMENT (OUTPATIENT)
Dept: OCCUPATIONAL THERAPY | Facility: CLINIC | Age: 66
End: 2021-04-24
Payer: COMMERCIAL

## 2021-04-24 LAB
INR PPP: 2.38 (ref 0.86–1.14)
MAGNESIUM SERPL-MCNC: 2 MG/DL (ref 1.6–2.3)
PHOSPHATE SERPL-MCNC: 2.6 MG/DL (ref 2.5–4.5)
POTASSIUM SERPL-SCNC: 3.8 MMOL/L (ref 3.4–5.3)

## 2021-04-24 PROCEDURE — 250N000011 HC RX IP 250 OP 636: Performed by: INTERNAL MEDICINE

## 2021-04-24 PROCEDURE — 250N000013 HC RX MED GY IP 250 OP 250 PS 637: Performed by: INTERNAL MEDICINE

## 2021-04-24 PROCEDURE — 36415 COLL VENOUS BLD VENIPUNCTURE: CPT | Performed by: INTERNAL MEDICINE

## 2021-04-24 PROCEDURE — 84132 ASSAY OF SERUM POTASSIUM: CPT | Performed by: INTERNAL MEDICINE

## 2021-04-24 PROCEDURE — 120N000001 HC R&B MED SURG/OB

## 2021-04-24 PROCEDURE — 83735 ASSAY OF MAGNESIUM: CPT | Performed by: INTERNAL MEDICINE

## 2021-04-24 PROCEDURE — 97110 THERAPEUTIC EXERCISES: CPT | Mod: GO

## 2021-04-24 PROCEDURE — 85610 PROTHROMBIN TIME: CPT | Performed by: INTERNAL MEDICINE

## 2021-04-24 PROCEDURE — 84100 ASSAY OF PHOSPHORUS: CPT | Performed by: INTERNAL MEDICINE

## 2021-04-24 PROCEDURE — 250N000013 HC RX MED GY IP 250 OP 250 PS 637: Performed by: PHYSICIAN ASSISTANT

## 2021-04-24 PROCEDURE — 99232 SBSQ HOSP IP/OBS MODERATE 35: CPT | Performed by: INTERNAL MEDICINE

## 2021-04-24 RX ORDER — WARFARIN SODIUM 10 MG/1
10 TABLET ORAL ONCE
Status: COMPLETED | OUTPATIENT
Start: 2021-04-24 | End: 2021-04-24

## 2021-04-24 RX ADMIN — MEMANTINE 10 MG: 10 TABLET ORAL at 09:26

## 2021-04-24 RX ADMIN — DONEPEZIL HYDROCHLORIDE 20 MG: 10 TABLET ORAL at 21:23

## 2021-04-24 RX ADMIN — WARFARIN SODIUM 10 MG: 10 TABLET ORAL at 12:52

## 2021-04-24 RX ADMIN — AZELASTINE HYDROCHLORIDE 1 SPRAY: 137 SPRAY, METERED NASAL at 09:27

## 2021-04-24 RX ADMIN — PREGABALIN 100 MG: 100 CAPSULE ORAL at 09:26

## 2021-04-24 RX ADMIN — MEMANTINE 10 MG: 10 TABLET ORAL at 21:23

## 2021-04-24 RX ADMIN — MIRABEGRON 50 MG: 50 TABLET, FILM COATED, EXTENDED RELEASE ORAL at 19:05

## 2021-04-24 RX ADMIN — PREGABALIN 100 MG: 100 CAPSULE ORAL at 21:23

## 2021-04-24 RX ADMIN — ASPIRIN 81 MG: 81 TABLET, DELAYED RELEASE ORAL at 09:26

## 2021-04-24 RX ADMIN — AZELASTINE HYDROCHLORIDE 1 SPRAY: 137 SPRAY, METERED NASAL at 21:24

## 2021-04-24 RX ADMIN — PROPAFENONE HYDROCHLORIDE 150 MG: 150 TABLET, FILM COATED ORAL at 21:23

## 2021-04-24 RX ADMIN — LAMOTRIGINE 25 MG: 25 TABLET ORAL at 13:28

## 2021-04-24 RX ADMIN — ATORVASTATIN CALCIUM 40 MG: 40 TABLET, FILM COATED ORAL at 19:04

## 2021-04-24 RX ADMIN — FOLIC ACID 5 MG: 1 TABLET ORAL at 13:27

## 2021-04-24 RX ADMIN — ACETAMINOPHEN 650 MG: 325 TABLET, FILM COATED ORAL at 13:31

## 2021-04-24 RX ADMIN — PROPAFENONE HYDROCHLORIDE 150 MG: 150 TABLET, FILM COATED ORAL at 13:27

## 2021-04-24 RX ADMIN — VENLAFAXINE HYDROCHLORIDE 300 MG: 75 CAPSULE, EXTENDED RELEASE ORAL at 10:50

## 2021-04-24 RX ADMIN — PROPAFENONE HYDROCHLORIDE 150 MG: 150 TABLET, FILM COATED ORAL at 06:08

## 2021-04-24 RX ADMIN — LEVOTHYROXINE SODIUM 150 MCG: 150 TABLET ORAL at 13:27

## 2021-04-24 RX ADMIN — ENOXAPARIN SODIUM 105 MG: 120 INJECTION SUBCUTANEOUS at 21:22

## 2021-04-24 RX ADMIN — PREGABALIN 100 MG: 100 CAPSULE ORAL at 15:51

## 2021-04-24 RX ADMIN — ENOXAPARIN SODIUM 105 MG: 120 INJECTION SUBCUTANEOUS at 09:23

## 2021-04-24 ASSESSMENT — ACTIVITIES OF DAILY LIVING (ADL)
ADLS_ACUITY_SCORE: 13

## 2021-04-24 NOTE — PLAN OF CARE
Pt is A&Ox4, VSS on RA. Neuros intact ex. baseline L eye nystagmus, R eye doesn't track. Tele SR with BBB. Up independently in room, voiding in bathroom. Regular diet. CMS intact, LLE skin graft dressing CDI. No PRN pain medications given this shift. IV SL. Discharge pending INR value over 2.5. Will continue to follow plan of care.

## 2021-04-24 NOTE — PLAN OF CARE
A/ox4. Up ad yaneth. Regular diet. VSS, on room air. Tele SR with BBB. INR 2.38 today, goal is 2.5-3.5. LLE wound cares completed, dressing CDI. Plan for WOC to re-evaluate due to new blister on L upper thigh. L eye nystagmus at baseline, R eye does not track-baseline. PRN tylenol for L shoulder pain. Plan to discharge once INR therapeutic.

## 2021-04-24 NOTE — PROGRESS NOTES
[facility]    Hospitalist Progress Note    Interval History   - Frustrated that his INR is creeping up so slowly    Assessment & Plan   Summary: Todd S Aschoff is a 65 year old male with PMH Aortic valve Dz s/p mechanical valve replacement on chronic coumadin, Atrial fib, HLP, Hypothyroidism, Obesity, Dementia, MDD with anxiety, Overactive Bladder and Possible Seizure D/O, Recent history of CVA (1/2021) who was admitted to [facility] for acute CVA.      Acute CVA   Historic/recent CVA (1/22/2021)   Patient presented with right extremity weakness. Head CT and CTA Head/Neck negative for hemorrhage or LVO.  Brain MRI 4/18/2021 confirmed small acute infarcts scattered throughout the left greater than the right cerebral hemisphere, multiple old infarcts. Recent INR subtherapeutic (1.3) and was started on subcutaneous lovenox bridging and continued coumadin.  LIPID: 254/43/164/233. Troponin: Negative x2. ECHO: Technically difficult study and limited.  EF of 55-60%, RV normal size and function.  Noted mech aortic valve that is poorly visualized.  Negative bubble study.  Similar findings when compared to previous ECHO 1/2021.    - Neurology consult appreciated:   - Continue warfarin and bridging until therapeutic   - Goal BP < 140/90   - Q4h neuro checks  - Appreciate Cardiology consult:     - Start ASA 81 mg/d  - Telemetry  - Regular diet  - PT/OT/SLP  - Continue subcutaneous Lovenox and coumadin (pharamcy consult to assist with management).   - Monitor INR (Goal 2.5-3.5).    - Continue lipitor    Subtherapeutic INR  INR 4/14/21 of 1.3 and subcutaneous lovenox (105 mg BID) prescribed/started on the same day.  Stroke symptoms began 4/17-4/18.    - Continue subcutaneous Lovenox and coumadin (pharamcy consult to assist with management).  - Monitor INR (Goal 2.5-3.5)    History of infected left lower extremity hematoma with cellulitis  S/p I&D (1/10, 1/26 with wound vac) and skin graft (2/3)  S/p  evacuation of infected hematoma and split thickness skin graft 1/2021.Left LE Venous US negative for DVT and thin crescent shaped fluid collection along the left calf representing known hematoma.    - WOC consult requested for continued wound management.      Mechanical Aortic Valve  - Continue subcutaneous Lovenox and coumadin (pharamcy consult to assist with management).    Seizure D/O, potential  Follows with Neurology as an outpatient and during previous admission 1/2021 where incidental right frontal CVA found patient was also started on Lamictal 25 mg/d for potential seizure.    - Continue Lamictal 25 mg/d.      Atrial fib, chronic  Rate controlled and managed PTA with propafenone. EKG with NSR.    - Telemetry.    - Resumed on PTA propafenone 150 mg every 8 hours.      HLP: Managed PTA with simvastatin.  Lipid panel completed and resulted as above.    - Stop simvastatin and start Lipitor 40 mg/d.      Hypothyroidism: PTA levothyroxine 150 mcg/d.    Right lateral rectus palsy: Chronic. No interventions    Dementia: Resumed on PTA Aricept 20 mg at bedtime and Namenda 10 mg BID    MDD with anxiety: PTA Effexor- mg/d    Overactive Bladder: PTA mirabegron 50 mg/d    Chronic Back Pain: PTA Lyrica 100 mg TID    Obesity  Body mass index is 40.3 kg/m .  Increase in all-cause morbidity and mortality.   - Recommend sleep study as an outpatient.        DVT Prophylaxis: Lovenox + Warfarin  Code Status: Full Code  PT/OT: ordered  Diet: Regular Diet Adult      Disposition: Expected discharge in ~ 2-3 days, when INR > 2.5 for two days    Jc Friedman MD  Text Page  (7am to 6pm)  -Data reviewed today: I reviewed all new labs and imaging results over the last 24 hours.    Physical Exam   Temp: 97.7  F (36.5  C) Temp src: Oral BP: (!) 145/82 Pulse: 70   Resp: 16 SpO2: 91 % O2 Device: None (Room air)    Vitals:    04/18/21 1425   Weight: 141.4 kg (311 lb 11.7 oz)     Vital Signs with Ranges  Temp:  [97.5  F (36.4   C)-98.2  F (36.8  C)] 97.7  F (36.5  C)  Pulse:  [66-82] 70  Resp:  [14-16] 16  BP: (114-145)/(59-82) 145/82  SpO2:  [91 %-97 %] 91 %  I/O last 3 completed shifts:  In: 500 [P.O.:500]  Out: -   O2 requirements: none    Constitutional: Obese male in NAD  HEENT: Eyes nonicteric  Cardiovascular: RRR, normal S1/2, no m/r/g  Respiratory: CTAB  Vascular: No LE pitting edema, noted distal pedal pulses  GI: Normoactive bowel sounds, nontender  Skin/Integumen: LLE in ACE wrap, back surgical skin lesion noted  Neuro/Psych: Appropriate affect and mood. A&Ox3, moves all extremities    Medications     - MEDICATION INSTRUCTIONS -       - MEDICATION INSTRUCTIONS -       Warfarin Therapy Reminder         aspirin  81 mg Oral Daily     atorvastatin  40 mg Oral QPM     azelastine  1 spray Both Nostrils BID     donepezil  20 mg Oral At Bedtime     enoxaparin ANTICOAGULANT  105 mg Subcutaneous Q12H     folic acid  5 mg Oral Daily     lamoTRIgine  25 mg Oral Daily     levothyroxine  150 mcg Oral Daily     memantine  10 mg Oral BID     mirabegron  50 mg Oral Daily     pregabalin  100 mg Oral TID     propafenone  150 mg Oral Q8H RANGEL     venlafaxine  300 mg Oral Daily       Data   Recent Labs   Lab 04/24/21  0955 04/23/21  0815 04/22/21  0758 04/21/21  0845 04/20/21  0848 04/19/21  0931 04/19/21  0305 04/18/21  1409 04/18/21  1409   WBC  --   --   --   --   --  6.7  --   --  7.6   HGB  --   --   --  14.5  --  14.9  --   --  14.5   MCV  --   --   --   --   --  91  --   --  91   PLT  --   --  210  --   --  226  --   --  220   INR 2.38* 2.33* 2.38* 1.97* 1.73* 1.70*  --   --  1.82*   NA  --   --   --  138 138 138  --   --  141   POTASSIUM 3.8  --   --  3.6 4.0 4.2  --    < > 3.9   CHLORIDE  --   --   --  106 105 104  --   --  105   CO2  --   --   --  27 31 33*  --   --  35*   BUN  --   --   --  18 22 20  --   --  21   CR  --   --  1.00 0.95 1.06 1.11  --   --  1.11   ANIONGAP  --   --   --  5 2* 1*  --   --  1*   CATERINA  --   --   --  8.5 8.9  9.0  --   --  8.7   GLC  --   --   --  144* 103* 101*  --   --  117*   ALBUMIN  --   --   --  3.3*  --   --   --   --   --    PROTTOTAL  --   --   --  6.8  --   --   --   --   --    BILITOTAL  --   --   --  1.1  --   --   --   --   --    ALKPHOS  --   --   --  86  --   --   --   --   --    ALT  --   --   --  31  --   --   --   --   --    AST  --   --   --  21  --   --   --   --   --    TROPI  --   --   --   --   --   --  <0.015  --  <0.015    < > = values in this interval not displayed.       Imaging:   No results found for this or any previous visit (from the past 24 hour(s)).

## 2021-04-24 NOTE — PLAN OF CARE
Pt here with acute/subacute strokes L>R. Neuros intact, baseline L eye nystagmus and R eye does not track, baseline per pt. VSS, tele NSR. Pt up independently in room. Denies pain. Dressing to LLE intact. Plan for pt to stay until INR therapeutic.   Pt greenlight on stoplight aggression tool.

## 2021-04-25 LAB
CREAT SERPL-MCNC: 0.98 MG/DL (ref 0.66–1.25)
GFR SERPL CREATININE-BSD FRML MDRD: 81 ML/MIN/{1.73_M2}
GLUCOSE BLDC GLUCOMTR-MCNC: 108 MG/DL (ref 70–99)
INR PPP: 2.69 (ref 0.86–1.14)
PLATELET # BLD AUTO: 240 10E9/L (ref 150–450)

## 2021-04-25 PROCEDURE — 85610 PROTHROMBIN TIME: CPT | Performed by: INTERNAL MEDICINE

## 2021-04-25 PROCEDURE — 250N000013 HC RX MED GY IP 250 OP 250 PS 637: Performed by: INTERNAL MEDICINE

## 2021-04-25 PROCEDURE — 99232 SBSQ HOSP IP/OBS MODERATE 35: CPT | Performed by: INTERNAL MEDICINE

## 2021-04-25 PROCEDURE — 85049 AUTOMATED PLATELET COUNT: CPT | Performed by: INTERNAL MEDICINE

## 2021-04-25 PROCEDURE — 36415 COLL VENOUS BLD VENIPUNCTURE: CPT | Performed by: INTERNAL MEDICINE

## 2021-04-25 PROCEDURE — 999N001017 HC STATISTIC GLUCOSE BY METER IP

## 2021-04-25 PROCEDURE — G0463 HOSPITAL OUTPT CLINIC VISIT: HCPCS

## 2021-04-25 PROCEDURE — 250N000013 HC RX MED GY IP 250 OP 250 PS 637: Performed by: PHYSICIAN ASSISTANT

## 2021-04-25 PROCEDURE — 250N000011 HC RX IP 250 OP 636: Performed by: INTERNAL MEDICINE

## 2021-04-25 PROCEDURE — 120N000001 HC R&B MED SURG/OB

## 2021-04-25 PROCEDURE — 82565 ASSAY OF CREATININE: CPT | Performed by: INTERNAL MEDICINE

## 2021-04-25 RX ORDER — WARFARIN SODIUM 7.5 MG/1
7.5 TABLET ORAL
Status: COMPLETED | OUTPATIENT
Start: 2021-04-25 | End: 2021-04-25

## 2021-04-25 RX ADMIN — ASPIRIN 81 MG: 81 TABLET, DELAYED RELEASE ORAL at 09:01

## 2021-04-25 RX ADMIN — AZELASTINE HYDROCHLORIDE 1 SPRAY: 137 SPRAY, METERED NASAL at 09:00

## 2021-04-25 RX ADMIN — DONEPEZIL HYDROCHLORIDE 20 MG: 10 TABLET ORAL at 21:50

## 2021-04-25 RX ADMIN — OXYCODONE HYDROCHLORIDE 5 MG: 5 TABLET ORAL at 13:18

## 2021-04-25 RX ADMIN — FOLIC ACID 5 MG: 1 TABLET ORAL at 13:17

## 2021-04-25 RX ADMIN — PROPAFENONE HYDROCHLORIDE 150 MG: 150 TABLET, FILM COATED ORAL at 13:17

## 2021-04-25 RX ADMIN — AZELASTINE HYDROCHLORIDE 1 SPRAY: 137 SPRAY, METERED NASAL at 20:13

## 2021-04-25 RX ADMIN — MEMANTINE 10 MG: 10 TABLET ORAL at 09:01

## 2021-04-25 RX ADMIN — PREGABALIN 100 MG: 100 CAPSULE ORAL at 09:01

## 2021-04-25 RX ADMIN — MEMANTINE 10 MG: 10 TABLET ORAL at 20:04

## 2021-04-25 RX ADMIN — LAMOTRIGINE 25 MG: 25 TABLET ORAL at 13:17

## 2021-04-25 RX ADMIN — VENLAFAXINE HYDROCHLORIDE 300 MG: 75 CAPSULE, EXTENDED RELEASE ORAL at 09:01

## 2021-04-25 RX ADMIN — ATORVASTATIN CALCIUM 40 MG: 40 TABLET, FILM COATED ORAL at 20:04

## 2021-04-25 RX ADMIN — PROPAFENONE HYDROCHLORIDE 150 MG: 150 TABLET, FILM COATED ORAL at 06:05

## 2021-04-25 RX ADMIN — PREGABALIN 100 MG: 100 CAPSULE ORAL at 21:50

## 2021-04-25 RX ADMIN — ACETAMINOPHEN 650 MG: 325 TABLET, FILM COATED ORAL at 03:43

## 2021-04-25 RX ADMIN — LEVOTHYROXINE SODIUM 150 MCG: 150 TABLET ORAL at 13:18

## 2021-04-25 RX ADMIN — ENOXAPARIN SODIUM 105 MG: 120 INJECTION SUBCUTANEOUS at 20:05

## 2021-04-25 RX ADMIN — WARFARIN SODIUM 7.5 MG: 7.5 TABLET ORAL at 18:37

## 2021-04-25 RX ADMIN — OXYCODONE HYDROCHLORIDE 5 MG: 5 TABLET ORAL at 21:50

## 2021-04-25 RX ADMIN — ENOXAPARIN SODIUM 105 MG: 120 INJECTION SUBCUTANEOUS at 09:03

## 2021-04-25 RX ADMIN — PREGABALIN 100 MG: 100 CAPSULE ORAL at 18:37

## 2021-04-25 RX ADMIN — MIRABEGRON 50 MG: 50 TABLET, FILM COATED, EXTENDED RELEASE ORAL at 18:37

## 2021-04-25 RX ADMIN — PROPAFENONE HYDROCHLORIDE 150 MG: 150 TABLET, FILM COATED ORAL at 21:50

## 2021-04-25 ASSESSMENT — ACTIVITIES OF DAILY LIVING (ADL)
ADLS_ACUITY_SCORE: 13

## 2021-04-25 NOTE — PROGRESS NOTES
[facility]    Hospitalist Progress Note    Interval History   INR therapeutic today. Patient is very pleased about this. Likely discharging tomorrow    Assessment & Plan   Summary: Todd S Aschoff is a 65 year old male with PMH Aortic valve Dz s/p mechanical valve replacement on chronic coumadin, Atrial fib, HLP, Hypothyroidism, Obesity, Dementia, MDD with anxiety, Overactive Bladder and Possible Seizure D/O, Recent history of CVA (1/2021) who was admitted to [facility] for acute CVA.      Acute CVA   Historic/recent CVA (1/22/2021)   Patient presented with right extremity weakness. Head CT and CTA Head/Neck negative for hemorrhage or LVO.  Brain MRI 4/18/2021 confirmed small acute infarcts scattered throughout the left greater than the right cerebral hemisphere, multiple old infarcts. Recent INR subtherapeutic (1.3) and was started on subcutaneous lovenox bridging and continued coumadin.  LIPID: 254/43/164/233. Troponin: Negative x2. ECHO: Technically difficult study and limited.  EF of 55-60%, RV normal size and function.  Noted mech aortic valve that is poorly visualized.  Negative bubble study.  Similar findings when compared to previous ECHO 1/2021.    - Neurology consult appreciated:   - Continue warfarin and bridging until therapeutic   - Goal BP < 140/90   - Q4h neuro checks  - Appreciate Cardiology consult:     - Start ASA 81 mg/d  - Telemetry  - Regular diet  - PT/OT/SLP  - Continue subcutaneous Lovenox and coumadin (pharamcy consult to assist with management).   - Monitor INR (Goal 2.5-3.5).    - Continue lipitor    Subtherapeutic INR  INR 4/14/21 of 1.3 and subcutaneous lovenox (105 mg BID) prescribed/started on the same day.  Stroke symptoms began 4/17-4/18.    - Continue subcutaneous Lovenox and coumadin (pharamcy consult to assist with management).  - Monitor INR (Goal 2.5-3.5)    History of infected left lower extremity hematoma with cellulitis  S/p I&D (1/10, 1/26 with  wound vac) and skin graft (2/3)  S/p evacuation of infected hematoma and split thickness skin graft 1/2021.Left LE Venous US negative for DVT and thin crescent shaped fluid collection along the left calf representing known hematoma.    - WOC consult requested for continued wound management.      Mechanical Aortic Valve  - Continue subcutaneous Lovenox and coumadin (pharamcy consult to assist with management).    Seizure D/O, potential  Follows with Neurology as an outpatient and during previous admission 1/2021 where incidental right frontal CVA found patient was also started on Lamictal 25 mg/d for potential seizure.    - Continue Lamictal 25 mg/d.      Atrial fib, chronic  Rate controlled and managed PTA with propafenone. EKG with NSR.    - Telemetry.    - Resumed on PTA propafenone 150 mg every 8 hours.      HLP: Managed PTA with simvastatin.  Lipid panel completed and resulted as above.    - Stop simvastatin and start Lipitor 40 mg/d.      Hypothyroidism: PTA levothyroxine 150 mcg/d.    Right lateral rectus palsy: Chronic. No interventions    Dementia: Resumed on PTA Aricept 20 mg at bedtime and Namenda 10 mg BID    MDD with anxiety: PTA Effexor- mg/d    Overactive Bladder: PTA mirabegron 50 mg/d    Chronic Back Pain: PTA Lyrica 100 mg TID    Obesity  Body mass index is 40.3 kg/m .  Increase in all-cause morbidity and mortality.   - Recommend sleep study as an outpatient.        DVT Prophylaxis: Lovenox + Warfarin  Code Status: Full Code  PT/OT: ordered  Diet: Regular Diet Adult      Disposition: Expected discharge tomorrow if INR remains above 2.5    Jc Friedman MD  Text Page  (7am to 6pm)  -Data reviewed today: I reviewed all new labs and imaging results over the last 24 hours.    Physical Exam   Temp: (P) 97.7  F (36.5  C) Temp src: (P) Oral BP: (!) 143/101 Pulse: 73   Resp: (P) 16 SpO2: (P) 97 % O2 Device: (P) None (Room air)    Vitals:    04/18/21 1425   Weight: 141.4 kg (311 lb 11.7 oz)      Vital Signs with Ranges  Temp:  [97.7  F (36.5  C)-98.2  F (36.8  C)] (P) 97.7  F (36.5  C)  Pulse:  [71-82] 73  Resp:  [16-18] (P) 16  BP: (128-145)/() 143/101  SpO2:  [93 %-97 %] (P) 97 %  I/O last 3 completed shifts:  In: 500 [P.O.:500]  Out: 300 [Urine:300]  O2 requirements: none    Constitutional: Obese male in NAD  HEENT: Eyes nonicteric  Cardiovascular: RRR, S1/2 click and systolic murmur  Respiratory: CTAB  Vascular: No LE pitting edema, noted distal pedal pulses  GI: Normoactive bowel sounds, nontender  Skin/Integumen: LLE in ACE wrap, back surgical skin lesion noted  Neuro/Psych: Appropriate affect and mood. A&Ox3, moves all extremities    Medications     - MEDICATION INSTRUCTIONS -       Warfarin Therapy Reminder         aspirin  81 mg Oral Daily     atorvastatin  40 mg Oral QPM     azelastine  1 spray Both Nostrils BID     donepezil  20 mg Oral At Bedtime     enoxaparin ANTICOAGULANT  105 mg Subcutaneous Q12H     folic acid  5 mg Oral Daily     lamoTRIgine  25 mg Oral Daily     levothyroxine  150 mcg Oral Daily     memantine  10 mg Oral BID     mirabegron  50 mg Oral Daily     pregabalin  100 mg Oral TID     propafenone  150 mg Oral Q8H RANGEL     venlafaxine  300 mg Oral Daily     warfarin ANTICOAGULANT  7.5 mg Oral ONCE at 18:00       Data   Recent Labs   Lab 04/25/21  0826 04/24/21  0955 04/23/21  0815 04/22/21  0758 04/21/21  0845 04/20/21  0848 04/19/21  0931 04/19/21  0305 04/19/21  0305   WBC  --   --   --   --   --   --  6.7  --   --    HGB  --   --   --   --  14.5  --  14.9  --   --    MCV  --   --   --   --   --   --  91  --   --      --   --  210  --   --  226  --   --    INR 2.69* 2.38* 2.33* 2.38* 1.97* 1.73* 1.70*   < >  --    NA  --   --   --   --  138 138 138  --   --    POTASSIUM  --  3.8  --   --  3.6 4.0 4.2  --   --    CHLORIDE  --   --   --   --  106 105 104  --   --    CO2  --   --   --   --  27 31 33*  --   --    BUN  --   --   --   --  18 22 20  --   --    CR 0.98   --   --  1.00 0.95 1.06 1.11   < >  --    ANIONGAP  --   --   --   --  5 2* 1*  --   --    CATERINA  --   --   --   --  8.5 8.9 9.0  --   --    GLC  --   --   --   --  144* 103* 101*  --   --    ALBUMIN  --   --   --   --  3.3*  --   --   --   --    PROTTOTAL  --   --   --   --  6.8  --   --   --   --    BILITOTAL  --   --   --   --  1.1  --   --   --   --    ALKPHOS  --   --   --   --  86  --   --   --   --    ALT  --   --   --   --  31  --   --   --   --    AST  --   --   --   --  21  --   --   --   --    TROPI  --   --   --   --   --   --   --   --  <0.015    < > = values in this interval not displayed.       Imaging:   No results found for this or any previous visit (from the past 24 hour(s)).

## 2021-04-25 NOTE — PROGRESS NOTES
"BRIEF NUTRITION ASSESSMENT      REASON FOR ASSESSMENT:  Todd S Aschoff is a 65 year old male seen by Registered Dietitian for LOS    CURRENT DIET AND INTAKE:  Diet:  Regular diet               Patient is consistently consuming 100% of meals     Breakfast today - Eggs, Raisin Bran, yogurt, muffin, an orange, orange juice  Dinner last night - Cheeseburger, chips, brownie, ice cream, milk, and lemonade  Lunch yesterday - [place]ues[place], side salad, carrots, lemonade x 2    Patient ready for discharge once INR therapeutic     ANTHROPOMETRICS:  Height: 6'2\"  Weight:  141.4 kg (312#)(4/18)  Body mass index is 40.3 kg/m    Weight Status: Obesity Grade III BMI >40  IBW:  86.4 kg   %IBW: 164%  Weight History:   Wt Readings from Last 10 Encounters:   04/18/21 141.4 kg (311 lb 11.7 oz)   02/01/21 142 kg (313 lb 1.6 oz)   01/09/21 140.6 kg (309 lb 14.4 oz)   12/11/20 141.9 kg (312 lb 12.8 oz)   12/08/20 142 kg (313 lb)   12/01/20 142.2 kg (313 lb 6.4 oz)   03/06/20 144.7 kg (319 lb)   02/17/20 141.5 kg (312 lb)   01/22/20 141.5 kg (312 lb)   12/31/19 137 kg (302 lb)     No weight loss noted     LABS:  Labs noted    MALNUTRITION:  Visual Nutrition Focused Physical Assessment (NFPA) not completed due to restrictions on face-to-face patient care during COVID-19 Pandemic. Do not suspect muscle/fat losses.  Patient does not meet two of the criteria necessary for diagnosing malnutrition.     NUTRITION INTERVENTION:  Nutrition Diagnosis:  No nutrition diagnosis at this time.    Implementation:  Nutrition Education:  Per Provider order if indicated.    FOLLOW UP/MONITORING:   Will re-evaluate in 7 - 10 days, or sooner, if re-consulted.    Alejandrina Matson RD, LD, [facility]   Clinical Dietitian - [facility]             "

## 2021-04-25 NOTE — PLAN OF CARE
Pt here with scattered acute infarcts. A&O x4. Neuros intact, R eye does not track, L eye nystagmus. VSS. Regular diet with thin liquids. Takes pills whole. Up independently. C/o headache, 8/10 scale, relieved with Tylenol. Pt scoring green on the Aggression Stop Light Tool. INR goal: 2.5-3.5 before discharge.

## 2021-04-25 NOTE — CONSULTS
[facility]  WO Nurse Inpatient Wound Assessment     Reason for consultation: Evaluate and treat blister over his donor site on his left thigh    Assessment  Left thigh wound due to skin graft donor site has a new blister filled with red fluid    Treatment Plan  Right thigh donor site blister wound: Every other day   Cleanse gently with wound cleanser   Apply skin prep   Apply mepilex foam   When it opens apply aquacel ag to wound bed   And cover with the same cover dressing   Orders Written  Recommended provider order: None, at this time  WO Nurse follow-up plan: weekly  Nursing to notify the Provider(s) and re-consult the WO Nurse if wound(s) deteriorates or new skin concern.    Patient History  According to provider note(s):  Past medical history significant for Aortic valve Dz s/p mechanical valve replacement on chronic coumadin, Atrial fib, HLP, Hypothyroidism, Obesity, Dementia, MDD with anxiety, Overactive Bladder and Possible Seizure D/O, Recent history of CVA (1/2021) who was admitted to [facility] for acute CVA.  reconsulted due to a change with his donor site     Objective Data  Containment of urine/stool: Continent of bladder and Continent of bowel    Active Diet Order:  Orders Placed This Encounter      Regular Diet Adult    Output:   I/O last 3 completed shifts:  In: 980 [P.O.:980]  Out: 300 [Urine:300]    Risk Assessment:   Sensory Perception: 4-->no impairment  Moisture: 4-->rarely moist  Activity: 3-->walks occasionally  Mobility: 4-->no limitation  Nutrition: 3-->adequate  Friction and Shear: 3-->no apparent problem  Ascencion Score: 21                          Labs:   Recent Labs   Lab 04/25/21  0826 04/21/21  0845 04/21/21  0845 04/19/21  0931 04/19/21  0931   ALBUMIN  --   --  3.3*  --   --    HGB  --   --  14.5  --  14.9   INR 2.69*   < > 1.97*   < > 1.70*   WBC  --   --   --   --  6.7    < > = values in this interval not displayed.       Physical Exam  Skin  inspection: focused left upper thigh donor site blister        Wound Location:  Left upper thigh donor site   Date of last photo:  Today 4/25/2021  Wound History: last visit no blister 4 days later a blister  Measurements (length x width x depth, in cm):  3.1 x 2.9 x 0.2cm   Wound Base: 100% intact serosanuinous filled blister   Tunneling: N/A  Undermining: N/A  Palpation of the wound bed: boggy   Periwound skin: intact and other donor sites have a raised texture to them possibly will be keliod?   Color: normal and consistent with surrounding tissue  Temperature: normal   Drainage: none  Description of drainage: none  Odor: none  Pain: denies     Interventions  Current support surface: Standard  Atmos Air mattress  Current off-loading measures: Pillows under calves and Pillows  Visual inspection of wound(s) completed  Wound Care: completed by RN  Supplies: floor stock, discussed with RN and discussed with patient  Education provided: plan of care and wound progress  Discussed plan of care with Patient and Nurse    Mame Martinez RN, CWOCN

## 2021-04-25 NOTE — PLAN OF CARE
VSS, denies pain. Up independently in the room, ambulated in the halls this evening. Tele is NSR. Pt on lovenox and coumadin and waiting for INR to be between 2.5-3.5

## 2021-04-26 ENCOUNTER — APPOINTMENT (OUTPATIENT)
Dept: OCCUPATIONAL THERAPY | Facility: CLINIC | Age: 66
End: 2021-04-26
Payer: COMMERCIAL

## 2021-04-26 ENCOUNTER — PATIENT OUTREACH (OUTPATIENT)
Dept: CARE COORDINATION | Facility: CLINIC | Age: 66
End: 2021-04-26

## 2021-04-26 VITALS
SYSTOLIC BLOOD PRESSURE: 138 MMHG | DIASTOLIC BLOOD PRESSURE: 84 MMHG | OXYGEN SATURATION: 94 % | RESPIRATION RATE: 16 BRPM | HEIGHT: 74 IN | WEIGHT: 312.17 LBS | HEART RATE: 71 BPM | BODY MASS INDEX: 40.06 KG/M2 | TEMPERATURE: 97.7 F

## 2021-04-26 DIAGNOSIS — M62.830 BACK MUSCLE SPASM: ICD-10-CM

## 2021-04-26 DIAGNOSIS — Z79.899 CONTROLLED SUBSTANCE AGREEMENT SIGNED: ICD-10-CM

## 2021-04-26 LAB
ANION GAP SERPL CALCULATED.3IONS-SCNC: <1 MMOL/L (ref 3–14)
BUN SERPL-MCNC: 20 MG/DL (ref 7–30)
CALCIUM SERPL-MCNC: 8.6 MG/DL (ref 8.5–10.1)
CHLORIDE SERPL-SCNC: 107 MMOL/L (ref 94–109)
CO2 SERPL-SCNC: 33 MMOL/L (ref 20–32)
CREAT SERPL-MCNC: 0.98 MG/DL (ref 0.66–1.25)
GFR SERPL CREATININE-BSD FRML MDRD: 81 ML/MIN/{1.73_M2}
GLUCOSE SERPL-MCNC: 85 MG/DL (ref 70–99)
INR PPP: 2.84 (ref 0.86–1.14)
POTASSIUM SERPL-SCNC: 3.7 MMOL/L (ref 3.4–5.3)
SODIUM SERPL-SCNC: 140 MMOL/L (ref 133–144)

## 2021-04-26 PROCEDURE — 99239 HOSP IP/OBS DSCHRG MGMT >30: CPT | Performed by: INTERNAL MEDICINE

## 2021-04-26 PROCEDURE — 97535 SELF CARE MNGMENT TRAINING: CPT | Mod: GO

## 2021-04-26 PROCEDURE — 97110 THERAPEUTIC EXERCISES: CPT | Mod: GO

## 2021-04-26 PROCEDURE — 250N000013 HC RX MED GY IP 250 OP 250 PS 637: Performed by: INTERNAL MEDICINE

## 2021-04-26 PROCEDURE — 250N000011 HC RX IP 250 OP 636: Performed by: INTERNAL MEDICINE

## 2021-04-26 PROCEDURE — 85610 PROTHROMBIN TIME: CPT | Performed by: INTERNAL MEDICINE

## 2021-04-26 PROCEDURE — 36415 COLL VENOUS BLD VENIPUNCTURE: CPT | Performed by: INTERNAL MEDICINE

## 2021-04-26 PROCEDURE — 80048 BASIC METABOLIC PNL TOTAL CA: CPT | Performed by: INTERNAL MEDICINE

## 2021-04-26 RX ORDER — WARFARIN SODIUM 2.5 MG/1
7.5 TABLET ORAL DAILY
Qty: 90 TABLET | Refills: 0 | Status: ON HOLD | OUTPATIENT
Start: 2021-04-26 | End: 2021-05-17

## 2021-04-26 RX ORDER — WARFARIN SODIUM 6 MG/1
6 TABLET ORAL
Status: DISCONTINUED | OUTPATIENT
Start: 2021-04-26 | End: 2021-04-26 | Stop reason: HOSPADM

## 2021-04-26 RX ORDER — ATORVASTATIN CALCIUM 40 MG/1
40 TABLET, FILM COATED ORAL EVERY EVENING
Qty: 30 TABLET | Refills: 1 | Status: ON HOLD | OUTPATIENT
Start: 2021-04-26 | End: 2021-05-19

## 2021-04-26 RX ADMIN — ACETAMINOPHEN 650 MG: 325 TABLET, FILM COATED ORAL at 07:13

## 2021-04-26 RX ADMIN — ENOXAPARIN SODIUM 105 MG: 120 INJECTION SUBCUTANEOUS at 08:11

## 2021-04-26 RX ADMIN — OXYCODONE HYDROCHLORIDE 5 MG: 5 TABLET ORAL at 07:13

## 2021-04-26 RX ADMIN — FOLIC ACID 5 MG: 1 TABLET ORAL at 14:01

## 2021-04-26 RX ADMIN — ASPIRIN 81 MG: 81 TABLET, DELAYED RELEASE ORAL at 08:12

## 2021-04-26 RX ADMIN — PROPAFENONE HYDROCHLORIDE 150 MG: 150 TABLET, FILM COATED ORAL at 06:41

## 2021-04-26 RX ADMIN — MEMANTINE 10 MG: 10 TABLET ORAL at 08:12

## 2021-04-26 RX ADMIN — AZELASTINE HYDROCHLORIDE 1 SPRAY: 137 SPRAY, METERED NASAL at 08:13

## 2021-04-26 RX ADMIN — LAMOTRIGINE 25 MG: 25 TABLET ORAL at 14:01

## 2021-04-26 RX ADMIN — PREGABALIN 100 MG: 100 CAPSULE ORAL at 08:12

## 2021-04-26 RX ADMIN — VENLAFAXINE HYDROCHLORIDE 300 MG: 75 CAPSULE, EXTENDED RELEASE ORAL at 08:12

## 2021-04-26 RX ADMIN — PROPAFENONE HYDROCHLORIDE 150 MG: 150 TABLET, FILM COATED ORAL at 14:01

## 2021-04-26 RX ADMIN — LEVOTHYROXINE SODIUM 150 MCG: 150 TABLET ORAL at 14:02

## 2021-04-26 ASSESSMENT — ACTIVITIES OF DAILY LIVING (ADL)
ADLS_ACUITY_SCORE: 13

## 2021-04-26 ASSESSMENT — MIFFLIN-ST. JEOR: SCORE: 2271.78

## 2021-04-26 NOTE — PLAN OF CARE
PT- Attempted to see pt this PM. Pt getting ready for discharge.     Pt discharged home with Home care. PT goals met.

## 2021-04-26 NOTE — PROGRESS NOTES
Clinic Care Coordination Contact  Community Health Worker Initial Outreach    Received order for CC. Patient is currently at [facility]. [facility] CHW will watch for hospital discharge. Care Coordination will check back in 1-2 business days.

## 2021-04-26 NOTE — DISCHARGE SUMMARY
[facility]    Hospitalist Discharge Summary       Date of Admission:  4/18/2021  Date of Discharge:  4/26/2021  Discharging Provider: Jc Friedman MD      Discharge Diagnoses   Acute CVA  Subtherapeutic INR    Follow-ups Needed After Discharge   Follow-up Appointments     Follow-up and recommended labs and tests       Follow up with primary care provider, Obdulio Ingram MD, within 7 days   for hospital follow- up.  No follow up labs or test are needed.  - Follow up with Neurology clinic in 4-8 weeks  - Follow up with INR clinic. Please check an INR in 1-2 days.  - Please obtain a sleep study when you have a chance, in 4-8 weeks. A   referral has been placed           Hospital Course   Todd S Aschoff is a 65 year old male with PMH Aortic valve Dz s/p mechanical valve replacement on chronic coumadin, Atrial fib, HLP, Hypothyroidism, Obesity, Dementia, MDD with anxiety, Overactive Bladder and Possible Seizure D/O, Recent history of CVA (1/2021) who was admitted to [facility] for acute CVA.     Patient presented with right extremity weakness. Head CT and CTA Head/Neck negative for hemorrhage or LVO.  Brain MRI 4/18/2021 confirmed small acute infarcts scattered throughout the left greater than the right cerebral hemisphere, multiple old infarcts. Recent INR subtherapeutic (1.3) and was started on subcutaneous lovenox bridging and continued coumadin.  ECHO: Technically difficult study and limited.  EF of 55-60%, RV normal size and function.  Noted mech aortic valve that is poorly visualized.  Negative bubble study.  Similar findings when compared to previous ECHO 1/2021.   Patient was started on aspirin this admission. Due to his subtherapeutic INR +stroke on admission, even in the presence of taking Lovenox, decision was made to keep patient inpatient until he achieved his INR goal of 2.5 to 3.5. Patient discharged on 4/26 with INR of 2.8.    History of infected left lower  extremity hematoma with cellulitis  S/p I&D (1/10, 1/26 with wound vac) and skin graft (2/3)  S/p evacuation of infected hematoma and split thickness skin graft 1/2021.Left LE Venous US negative for DVT and thin crescent shaped fluid collection along the left calf representing known hematoma.    Mechanical Aortic Valve  INR goal 2.5 to 3.5    Seizure D/O, potential  Follows with Neurology as an outpatient and during previous admission 1/2021 where incidental right frontal CVA found patient was also started on Lamictal 25 mg/d for potential seizure.    - Continue Lamictal 25 mg/d.      HLP: Managed PTA with simvastatin  -  this admission, patient switched from simvastatin to atorvastatin    Obesity  Body mass index is 40.3 kg/m .  Increase in all-cause morbidity and mortality.   - Recommend sleep study as an outpatient.        Consultations This Hospital Stay   PHARMACY TO DOSE WARFARIN  PATIENT LEARNING CENTER IP CONSULT  SWALLOW EVAL SPEECH PATH AT BEDSIDE IP CONSULT  SPEECH LANGUAGE PATH ADULT IP CONSULT  PHARMACY IP CONSULT  PHARMACY IP CONSULT  PHARMACY IP CONSULT  PHYSICAL THERAPY ADULT IP CONSULT  OCCUPATIONAL THERAPY ADULT IP CONSULT  CARE MANAGEMENT / SOCIAL WORK IP CONSULT  NEUROLOGY IP CONSULT  PHARMACY LIAISON FOR MEDICATION COVERAGE CONSULT  WOUND OSTOMY CONTINENCE NURSE  IP CONSULT  CARDIOLOGY IP CONSULT  WOUND OSTOMY CONTINENCE NURSE  IP CONSULT  SMOKING CESSATION PROGRAM IP CONSULT    Code Status   Full Code    Time Spent on this Encounter   I, Jc Friedman, personally saw the patient today and spent approximately 35 minutes discharging this patient.       Jc Friedman MD  [facility]  ______________________________________________________________________    Physical Exam   Vital Signs: Temp: 97.7  F (36.5  C) Temp src: Oral BP: 138/84 Pulse: 71   Resp: 16 SpO2: 94 % O2 Device: None (Room air)    Weight: 312 lbs 2.74 oz    Constitutional: Obese male in  NAD  HEENT: Eyes nonicteric  Cardiovascular: RRR, S1/2 click and systolic murmur  Respiratory: CTAB  Vascular: No LE pitting edema,   GI: Normoactive bowel sounds, nontender  Skin/Integumen: LLE wounds noted, LLE partially wrapped  Neuro/Psych: Appropriate affect and mood. A&Ox3, moves all extremities        Primary Care Physician   Obdulio Ingram MD    Discharge Disposition   Discharged to home  Condition at discharge: Stable    Significant Results and Procedures   Most Recent 3 CBC's:  Recent Labs   Lab Test 04/25/21  0826 04/22/21  0758 04/21/21  0845 04/19/21  0931 04/18/21  1409 02/06/21  0611   WBC  --   --   --  6.7 7.6 6.7   HGB  --   --  14.5 14.9 14.5 12.6*   MCV  --   --   --  91 91 100    210  --  226 220 237     Most Recent 3 BMP's:  Recent Labs   Lab Test 04/26/21  0453 04/25/21  0826 04/24/21  0955 04/22/21  0758 04/21/21  0845 04/20/21  0848     --   --   --  138 138   POTASSIUM 3.7  --  3.8  --  3.6 4.0   CHLORIDE 107  --   --   --  106 105   CO2 33*  --   --   --  27 31   BUN 20  --   --   --  18 22   CR 0.98 0.98  --  1.00 0.95 1.06   ANIONGAP <1*  --   --   --  5 2*   CATERINA 8.6  --   --   --  8.5 8.9   GLC 85  --   --   --  144* 103*     Most Recent 2 LFT's:  Recent Labs   Lab Test 04/21/21  0845 01/22/21  1105   AST 21 25   ALT 31 24   ALKPHOS 86 106   BILITOTAL 1.1 0.9   ,   Results for orders placed or performed during the hospital encounter of 04/18/21   CT Head w/o Contrast    Narrative    CT OF THE HEAD WITHOUT CONTRAST   4/18/2021 2:18 PM     COMPARISON: Head MRI 1/22/2001    HISTORY: Slurred speech    TECHNIQUE:  Axial CT images of the head from the skull base to the  vertex were acquired without IV contrast.    FINDINGS:   INTRACRANIAL CONTENTS: No intracranial hemorrhage, extraaxial  collection, or mass effect.  No CT evidence of acute infarct.    Chronic infarcts in the right temporal occipital parenchyma and  cerebellar hemispheres again noted. Superimposed mild  generalized  volume loss.    VISUALIZED ORBITS/SINUSES/MASTOIDS: No significant orbital  abnormality.  No significant paranasal sinus mucosal disease. No  significant middle ear or mastoid effusion.    OSSEOUS STRUCTURES/SOFT TISSUES: No significant abnormality.      Impression    IMPRESSION:  1.  No CT evidence of a mass, hemorrhage or acute stroke.  2.  Chronic infarcts in the right temporal occipital parenchyma and  cerebellar hemispheres.  3.  No change.    Radiation dose for this scan was reduced using automated exposure  control, adjustment of the mA and/or kV according to patient size, or  iterative reconstruction technique    GENET CASEY MD   CTA Head Neck with Contrast    Addendum: 4/18/2021    Findings were discussed with Dr. Spear at 2:41 PM hours on  4/18/2021.    GENET CASEY MD      Narrative    CT ANGIOGRAM OF THE HEAD AND NECK WITH CONTRAST  4/18/2021 2:20 PM     COMPARISON: Head MRA and neck MRA examinations 1/22/2001    HISTORY: Slurred speech. Acute stroke suspected.    TECHNIQUE:    Precontrast localizing scans were followed by CT angiography with an  injection of 70 mL [id number]-370 nonionic intravenous contrast material  with scans through the head and neck.  Images were transferred to a  separate 3-D workstation where multiplanar reformations and 3-D images  were created.  Estimates of carotid stenoses are made relative to the  distal internal carotid artery diameters except as noted.      FINDINGS:   HEAD CTA:  ANTERIOR CIRCULATION: No significant stenosis or occlusion. Standard  [place] anatomy.    POSTERIOR CIRCULATION: No significant stenosis or occlusion. Balanced  vertebral arteries supply a normal basilar artery.    ANEURYSM/VASCULAR MALFORMATION: None.    NECK CTA:  RIGHT CAROTID: No measurable stenosis in the right ICA based on NASCET  criteria.    LEFT CAROTID: No measurable stenosis in the left ICA based on NASCET  criteria.     VERTEBRAL ARTERIES: Balanced vertebral  arteries are patent in the neck  and into the head.     AORTIC ARCH: Classic aortic arch anatomy with no significant stenosis  at the origin of the great vessels.      Impression    IMPRESSION:    HEAD CTA:  1.  Normal head CTA.  2.  No significant stenosis.  No aneurysm or vascular malformation.  3.  No significant change.    NECK CTA:  1.  Normal neck CTA  for age.  2.  No significant stenosis as per NASCET criteria.  3.  No significant change.    Radiation dose for this scan was reduced using automated exposure  control, adjustment of the mA and/or kV according to patient size, or  iterative reconstruction technique    GENET CASEY MD   MR Brain w/o & w Contrast    Narrative    MRI BRAIN WITHOUT AND WITH CONTRAST  4/18/2021 4:03 PM    HISTORY:  Neuro deficit, acute, stroke suspected; right  weakness,  CT/CTA negative.     TECHNIQUE:  Multiplanar, multisequence MRI of the brain without and  with 14 mL Gadavist.    COMPARISON: Head MRI 1/22/2021.    FINDINGS:  Mild motion artifact. Multiple small areas of diffusion restriction  are present scattered throughout the left middle cerebral artery  distribution involving the cortex and subcortical white matter of the  left posterior frontal lobe. Small area of diffusion restriction  involving the cortex of the right anterior middle frontal gyrus in a  different location compared to the prior exam. Probable small area of  diffusion restriction involving the right frontal lobe along the  posterior aspect of the superior frontal sulcus. Questionable areas of  subtle diffusion restriction at the bilateral superior parietal  lobules near the vertex, limited due to artifact. No evidence of  recent hemorrhage. A few scattered microhemorrhages are present  predominantly near the gray-white junctions. Area of branching linear  enhancement within the left cerebellum consistent with incidental  benign developmental venous anomaly. Multiple old bilateral cerebellar  infarcts  "are present. Old right occipital lobe infarct. Probable small  old periventricular white matter infarcts. Major intracranial flow  voids are maintained.    The visualized calvarium, tympanic cavities, and mastoid cavities are  unremarkable. Right lens replacement is present.      Impression    IMPRESSION:  1. Small acute infarcts scattered throughout the left greater than  right cerebral hemispheres.  2. Volume loss, chronic small vessel ischemic change, and multiple old  infarcts.    BRADLEY ROMO MD   MR Brain w/o & w Contrast    Narrative    MRI BRAIN WITHOUT AND WITH CONTRAST  4/18/2021 7:40 PM    HISTORY:  Neuro deficit, acute, stroke suspected; Acute ischemic  stroke.     TECHNIQUE:  Multiplanar, multisequence MRI of the brain without and  with 14 mL Gadavist.     COMPARISON: Brain MRI 4/18/2021 at 15:42    FINDINGS:  Intravenous contrast is present on \"noncontrast\" portion of this exam  related to recent brain MRI performed the same day at 15:42.    Mild motion artifact. Multiple small areas of diffusion restriction  are present throughout the left greater than right frontal lobes. No  evidence of hemorrhage. A few scattered microhemorrhages are present  near the gray-white junctions. Area of branching linear enhancement  within the left cerebellum most consistent with incidental development  venous anomaly, considered to be normal variant. Multiple old  bilateral cerebellar infarcts. Old right occipital lobe infarct with  evidence of laminar necrosis. Probable old periventricular white  matter infarcts. Subarachnoid FLAIR T2 hyperintensity is present  likely related to recent intravenous contrast administration. Major  intracranial flow voids are maintained.    The visualized calvarium, tympanic cavities, mastoid cavities, and  paranasal sinuses are unremarkable. Right lens replacement noted.      Impression    IMPRESSION:  1. No significant change compared to same day brain MRI with and  without contrast " performed at 15:42.  2. Multiple small infarcts scattered throughout the left greater than  right cerebral hemispheres.  3. Volume loss, chronic small vessel ischemic change, and multiple old  infarcts.    BRADLEY ROMO MD   US Lower Extremity Venous Duplex Left    Narrative    ULTRASOUND VENOUS LOWER EXTREMITY UNILATERAL LEFT    2021 4:29 PM     HISTORY: Recent infected hematoma.  Reported increased swelling with  color changes    COMPARISON: None.    TECHNIQUE: Ultrasound gray scale, Color Doppler flow, and spectral  Doppler waveform analysis performed.    FINDINGS:   The left common femoral, superficial femoral, popliteal and  segmentally visualized calf veins are patent and fully compressible  and demonstrate normal venous Doppler flow. The visualized greater  saphenous vein is negative for thrombus.    The contralateral right common femoral vein is patent without deep  venous thrombosis.    Along the medial left calf is a crescent-shaped hypoechoic avascular  fluid collection measuring 9.0 x 4.6 x 1.2 cm.      Impression    IMPRESSION:   1.  No DVT demonstrated.   2.  Thin crescent shaped fluid collection along the left calf  representing the known hematoma.    RONA YING MD   Echo Limited with Bubble Study    Narrative    [id number]  [id number]  [id number]  [id number]^MARIELA^MATHEW^ILIANA     [facility]  Echocardiography Laboratory  [address] [place] [address]     Name: ASCHOFF, TODD S  MRN: [id number]  : 1955  Study Date: 2021 01:08 PM  Age: 65 yrs  Gender: Male  Patient Location: [facility]  Reason For Study: Cerebrovascular Incident  Ordering Physician: MATHEW SIERRA  Referring Physician: FARRAH MENDEZ  Performed By: Yossi Cifuentes RDCS     BSA: 2.6 m2  Height: 74 in  Weight: 311 lb  HR: 71  BP: 127/94 mmHg  ______________________________________________________________________________  Procedure  Limited Portable Bubble Echo Adult. Optison (NDC #1256-0735)  given  intravenously.  ______________________________________________________________________________  Interpretation Summary     Technically difficult study. Limited echo. Bubble study requested again.     The visual ejection fraction is estimated at 55-60%.  The right ventricle is normal in size and function.  There is a mechanical aortic valve. Poorly visualized. The mean AoV pressure  gradient is 10.0 mmHg. The calculated aortic valve are is 1.9 cm^2.  A contrast injection (Bubble Study) was performed that was negative for flow  across the interatrial septum. Suboptimal image quality overall. Similar  findings reported in Jan 2021.  ______________________________________________________________________________  Left Ventricle  The left ventricle is normal in size. The visual ejection fraction is  estimated at 55-60%. Regional wall motion abnormalities cannot be excluded due  to limited visualization.     Right Ventricle  The right ventricle is normal in size and function.     Atria  A contrast injection (Bubble Study) was performed that was negative for flow  across the interatrial septum.     Mitral Valve  There is mild mitral annular calcification. This degree of valvular  regurgitation is within normal limits. There is trace to mild mitral  regurgitation.     Tricuspid Valve  The tricuspid valve is not well visualized. Right ventricular systolic  pressure could not be approximated due to inadequate tricuspid regurgitation.     Aortic Valve  The peak AoV pressure gradient is 21.0 mmHg. The mean AoV pressure gradient is  10.0 mmHg. The calculated aortic valve are is 1.9 cm^2. There is a mechanical  aortic valve.     Pulmonic Valve  The pulmonic valve is not well visualized.     Vessels  Unable to assess mean RA pressure due to technically difficult study.     Pericardium  There is no pericardial effusion.     ______________________________________________________________________________  MMode/2D Measurements &  Calculations  IVSd: 1.0 cm  LVIDd: 4.6 cm  LVIDs: 2.9 cm  LVPWd: 1.3 cm  FS: 37.1 %  LV mass(C)d: 196.1 grams  LV mass(C)dI: 74.8 grams/m2     asc Aorta Diam: 3.1 cm  LVOT diam: 2.3 cm  LVOT area: 4.2 cm2  RWT: 0.55     Doppler Measurements & Calculations  Ao V2 max: 227.0 cm/sec  Ao max P.0 mmHg  Ao V2 mean: 150.0 cm/sec  Ao mean PG: 10.0 mmHg  Ao V2 VTI: 40.6 cm  SHAWN(I,D): 1.9 cm2  SHAWN(V,D): 1.5 cm2  LV V1 max P.8 mmHg  LV V1 max: 84.2 cm/sec  LV V1 VTI: 18.3 cm  SV(LVOT): 76.0 ml  SI(LVOT): 29.0 ml/m2  AV Enrike Ratio (DI): 0.37  SHAWN Index (cm2/m2): 0.71     ______________________________________________________________________________  Report approved by: Nu Villela 2021 03:59 PM           *Note: Due to a large number of results and/or encounters for the requested time period, some results have not been displayed. A complete set of results can be found in Results Review.       Discharge Orders      NEUROLOGY ADULT REFERRAL      Home care nursing referral      Home Care PT Referral for Hospital Discharge      Home Care OT Referral for Hospital Discharge      SLEEP EVALUATION & MANAGEMENT REFERRAL - [facility]  [phone] (Age 18 and up)      Reason for your hospital stay    You were hospitalized for a stroke. You were kept in the hospital longer so that you would be fully anticoagulated on warfarin.     Activity    Your activity upon discharge: activity as tolerated     Discharge Instructions     Follow-up and recommended labs and tests     Follow up with primary care provider, Obdulio Ingram MD, within 7 days for hospital follow- up.  No follow up labs or test are needed.  - Follow up with Neurology clinic in 4-8 weeks  - Follow up with INR clinic. Please check an INR in 1-2 days.  - Please obtain a sleep study when you have a chance, in 4-8 weeks. A referral has been placed     Diet    Follow this diet upon discharge:   Regular Diet Adult     Discharge  Medications   Current Discharge Medication List      START taking these medications    Details   aspirin (ASA) 81 MG EC tablet Take 1 tablet (81 mg) by mouth daily  Qty: 90 tablet, Refills: 1    Associated Diagnoses: Cerebrovascular accident (CVA), unspecified mechanism (H)      atorvastatin (LIPITOR) 40 MG tablet Take 1 tablet (40 mg) by mouth every evening  Qty: 30 tablet, Refills: 1    Associated Diagnoses: Cerebrovascular accident (CVA), unspecified mechanism (H)         CONTINUE these medications which have CHANGED    Details   warfarin ANTICOAGULANT (COUMADIN) 2.5 MG tablet Take 3 tablets (7.5 mg) by mouth daily  Qty: 90 tablet, Refills: 0    Associated Diagnoses: Long term current use of anticoagulant therapy         CONTINUE these medications which have NOT CHANGED    Details   acetaminophen (TYLENOL) 500 MG tablet Take 1-2 tablets (500-1,000 mg) by mouth every 8 hours as needed for mild pain    Associated Diagnoses: Wound of left lower extremity, initial encounter      azelastine (ASTELIN) 0.1 % nasal spray USE 2 SPRAYS IN NOSTRIL 2 TIMES DAILY AS NEEDED.  Qty: 30 mL, Refills: 11    Associated Diagnoses: Rash      cetirizine (ZYRTEC) 10 MG tablet TAKE ONE TABLET BY MOUTH ONCE DAILY AS NEEDED  Qty: 90 tablet, Refills: 3    Associated Diagnoses: Chronic rhinitis      cyclobenzaprine (FLEXERIL) 10 MG tablet TAKE ONE TABLET BY MOUTH THREE TIMES DAILY AS NEEDED FOR MUSCLE SPASM.  Qty: 270 tablet, Refills: 0    Associated Diagnoses: Muscle spasm      donepezil (ARICEPT) 10 MG tablet Take 2 tablets (20 mg) by mouth At Bedtime Stop if diarrhea or nausea develop  Qty: 180 tablet, Refills: 1    Associated Diagnoses: Memory difficulties      folic acid (FOLVITE) 1 MG tablet Take 5 tablets (5 mg) by mouth daily  Qty: 150 tablet, Refills: 3    Associated Diagnoses: Major depressive disorder, recurrent episode, in full remission (H)      furosemide (LASIX) 20 MG tablet Take 1-2 tablets (20-40 mg) by mouth daily as needed  (leg swelling)  Qty: 60 tablet, Refills: 0    Associated Diagnoses: Bilateral leg edema      guanFACINE (TENEX) 1 MG tablet Take 1 tablet (1 mg) by mouth At Bedtime  Qty: 90 tablet, Refills: 0    Associated Diagnoses: Major depressive disorder, recurrent episode, moderate (H)      lamoTRIgine (LAMICTAL) 25 MG tablet Take 1 tablet (25 mg) by mouth daily  Qty: 90 tablet, Refills: 1    Associated Diagnoses: Generalized muscle weakness      levothyroxine (SYNTHROID/LEVOTHROID) 150 MCG tablet Take 1 tablet (150 mcg) by mouth daily  Qty: 90 tablet, Refills: 3    Associated Diagnoses: Acquired hypothyroidism      liothyronine (CYTOMEL) 25 MCG tablet Take 1 tablet (25 mcg) by mouth daily  Qty: 30 tablet, Refills: 3    Associated Diagnoses: Major depressive disorder, recurrent episode, in full remission (H)      memantine (NAMENDA) 10 MG tablet Take 10 mg by mouth 2 times daily      mirabegron (MYRBETRIQ) 50 MG 24 hr tablet Take 1 tablet (50 mg) by mouth daily  Qty: 90 tablet, Refills: 3    Associated Diagnoses: Overactive bladder      potassium chloride ER (K-TAB) 20 MEQ CR tablet Take 1 tablet (20 mEq) by mouth daily as needed (Take one tablet for each furosemide tablet that is taken)  Qty: 60 tablet, Refills: 0    Associated Diagnoses: Bilateral leg edema      pregabalin (LYRICA) 100 MG capsule Take 1 capsule (100 mg) by mouth 3 times daily Do not take if sleepy/sedated.  Qty: 270 capsule, Refills: 0    Associated Diagnoses: Chronic bilateral low back pain without sciatica      propafenone (RYTHMOL) 150 MG TABS tablet Take 1 tablet (150 mg) by mouth every 8 hours  Qty: 270 tablet, Refills: 0    Associated Diagnoses: Chronic atrial fibrillation (H)      senna-docusate (SENOKOT-S;PERICOLACE) 8.6-50 MG per tablet Take 1 tablet by mouth 2 times daily as needed for constipation  Qty: 60 tablet, Refills: 11    Associated Diagnoses: Constipation, unspecified constipation type      venlafaxine (EFFEXOR-XR) 150 MG 24 hr capsule  Take 2 capsules (300 mg) by mouth daily  Qty: 180 capsule, Refills: 3    Comments: Appeal was approved.  Associated Diagnoses: Major depressive disorder, recurrent episode, moderate (H)         STOP taking these medications       enoxaparin ANTICOAGULANT (LOVENOX) 120 MG/0.8ML syringe Comments:   Reason for Stopping:         oxyCODONE (ROXICODONE) 5 MG tablet Comments:   Reason for Stopping:         simvastatin (ZOCOR) 40 MG tablet Comments:   Reason for Stopping:             Allergies   Allergies   Allergen Reactions     Gabapentin      Severe behavioral disturbances

## 2021-04-26 NOTE — PROGRESS NOTES
Care Management Discharge Note    Discharge Date: 04/26/21       Discharge Disposition: Home, Home Care    Discharge Services:  Home care      Discharge Transportation: family or friend will provide    Private pay costs discussed: Not applicable    Patient/family educated on Medicare website which has current facility and service quality ratings: no    Education Provided on the Discharge Plan:    Persons Notified of Discharge Plans: RN, patient, patient's spouse Lissy  Patient/Family in Agreement with the Plan: yes    Handoff Referral Completed: Yes    Additional Information:  Updated [facility] health about discharge today and need for INR draw in 1-2 days.  Confirmed with [facility] that he will have nurse visit and INR draw on 4/28.  Updated patient's spouse regarding this and she was aware as  nurse had called her.       Nolvia Perez RN

## 2021-04-26 NOTE — PLAN OF CARE
A&O. HTN noted, Within parameters. Pain reduced with PRN oxycodone, heat packs. Neuros intact. Baseline strabismus hussein eyes. Up Ind. Discharge pending therapeutic INR.

## 2021-04-26 NOTE — PLAN OF CARE
Occupational Therapy Discharge Summary    Reason for therapy discharge:    All goals and outcomes met, no further needs identified.    Progress towards therapy goal(s). See goals on Care Plan in [facility] electronic health record for goal details.  Goals met    Therapy recommendation(s):    Assist for I/ADLs as needed and OP OT to further address independence in I/ADLs and cognition.

## 2021-04-26 NOTE — PLAN OF CARE
Pt did not have PIV.  Discharge instructions reviewed with pt.  All questions regarding new meds, med changes, stroke education, follow up care answered.  Pt left with meds and with all belongings.  Pt to discharge when ride arrives ~ 1600.

## 2021-04-26 NOTE — PLAN OF CARE
Patient admitted with scattered infarcts L>R. VSS, tele NSR, up independently in room, tolerating regular diet. Neuro's intact except baseline blurred vision (wears glasses) left eye nystagmus and strabismus, right eye does not track to right. Showered, wound care done to LLE. Reports pain in left shoulder d/t bursitis, Oxycodone given x1 with relief. INR 2.69 today, plan for discharge to home tomorrow if INR stays between 2.5-3.5.

## 2021-04-27 ENCOUNTER — DOCUMENTATION ONLY (OUTPATIENT)
Dept: INTERNAL MEDICINE | Facility: CLINIC | Age: 66
End: 2021-04-27

## 2021-04-27 ENCOUNTER — TELEPHONE (OUTPATIENT)
Dept: INTERNAL MEDICINE | Facility: CLINIC | Age: 66
End: 2021-04-27

## 2021-04-27 DIAGNOSIS — I48.91 ATRIAL FIBRILLATION (H): ICD-10-CM

## 2021-04-27 DIAGNOSIS — I48.20 CHRONIC ATRIAL FIBRILLATION (H): ICD-10-CM

## 2021-04-27 DIAGNOSIS — Z79.01 LONG TERM CURRENT USE OF ANTICOAGULANT THERAPY: ICD-10-CM

## 2021-04-27 DIAGNOSIS — Z95.2 AORTIC VALVE PROSTHESIS PRESENT: ICD-10-CM

## 2021-04-27 NOTE — PROGRESS NOTES
"ANTICOAGULATION  MANAGEMENT: Discharge Review    Todd S Aschoff chart reviewed for anticoagulation continuity of care    Hospital Admission on 4/18 to 4/26/21 for CVA.    Discharge disposition: Home with Home Care    Results:    Recent labs: (last 7 days)     04/21/21  0845 04/22/21  0758 04/23/21  0815 04/24/21  0955 04/25/21  0826 04/26/21  0453   INR 1.97* 2.38* 2.33* 2.38* 2.69* 2.84*     Anticoagulation inpatient management:     more warfarin administered than maintenance regimen     Anticoagulation discharge instructions:     Warfarin dosing: increase dose to 7.5 mg daily   Bridging: bridging with enoxaparin (Lovenox) until discharge   INR goal change: No      Medication changes affecting anticoagulation: Yes: started on ASA 81 mg. Simvastatin was changed to Lipitor.    Additional factors affecting anticoagulation: Yes: acute stroke    Plan     Agree with discharge plan for follow up on 4/28/21.  Per  (Nolvia Perez RN) note on 4/26/21:  \"Updated [facility] home health about discharge today and need for INR draw in 1-2 days.  Confirmed with [facility] that he will have nurse visit and INR draw on 4/28.  Updated patient's spouse regarding this and she was aware as HC nurse had called her.\"        Recommended follow up is scheduled  Patient not contacted    Anticoagulation Calendar updated    Nova John RN    "

## 2021-04-28 ENCOUNTER — TELEPHONE (OUTPATIENT)
Dept: INTERNAL MEDICINE | Facility: CLINIC | Age: 66
End: 2021-04-28

## 2021-04-28 ENCOUNTER — ANTICOAGULATION THERAPY VISIT (OUTPATIENT)
Dept: INTERNAL MEDICINE | Facility: CLINIC | Age: 66
End: 2021-04-28

## 2021-04-28 DIAGNOSIS — I48.91 ATRIAL FIBRILLATION (H): ICD-10-CM

## 2021-04-28 DIAGNOSIS — I48.20 CHRONIC ATRIAL FIBRILLATION (H): ICD-10-CM

## 2021-04-28 DIAGNOSIS — Z79.01 LONG TERM CURRENT USE OF ANTICOAGULANT THERAPY: ICD-10-CM

## 2021-04-28 DIAGNOSIS — Z95.2 AORTIC VALVE PROSTHESIS PRESENT: ICD-10-CM

## 2021-04-28 LAB — INR PPP: 3.7 (ref 0.9–1.1)

## 2021-04-28 RX ORDER — DIAZEPAM 5 MG
TABLET ORAL
Qty: 60 TABLET | Refills: 0 | Status: SHIPPED | OUTPATIENT
Start: 2021-04-28 | End: 2021-06-09

## 2021-04-28 NOTE — PROGRESS NOTES
ANTICOAGULATION MANAGEMENT     Patient Name:  Todd S Aschoff  Date:  4/28/2021    ASSESSMENT /SUBJECTIVE:    Today's INR result of 3.7 is subtherapeutic. Goal INR of 2.5-3.5      Warfarin dose taken: Warfarin taken as instructed    Diet: No new diet changes affecting INR    Medication changes/ interactions: Patient has been taking 81 mg daily, started on atorvastatin    Previous INR: Therapeutic     S/S of bleeding or thromboembolism: No    New injury or illness: No    Upcoming surgery, procedure or cardioversion: No    Additional findings: None      PLAN:    Telephone call with home care nurse Vanessa regarding INR result and instructed:     Warfarin Dosing Instructions: 5 mg today then continue your current warfarin dose of 7.5 mg daily    Instructed patient to follow up no later than: 04/30  Orders given to  Homecare nurse/facility to recheck    Education provided: None required      kirill Mendez, verbalizes understanding and agrees to warfarin dosing plan.    Instructed to call the Anticoagulation Clinic for any changes, questions or concerns. (#[phone])        Britta Snowden RN      OBJECTIVE:  Recent labs: (last 7 days)     04/22/21  0758 04/23/21  0815 04/24/21  0955 04/25/21  0826 04/26/21  0453 04/28/21   INR 2.38* 2.33* 2.38* 2.69* 2.84* 3.7*         No question data found.  Anticoagulation Summary  As of 4/28/2021    INR goal:  2.5-3.5   TTR:  46.0 % (10.2 mo)   INR used for dosing:  3.7 (4/28/2021)   Warfarin maintenance plan:  7.5 mg (5 mg x 1.5) every day   Full warfarin instructions:  7.5 mg every day   Weekly warfarin total:  52.5 mg   Plan last modified:  Nova John RN (4/27/2021)   Next INR check:     Priority:  High   Target end date:  Indefinite    Indications    Aortic valve prosthesis present [Z95.2]  Atrial fibrillation (H) [I48.91]  Long term current use of anticoagulant therapy [Z79.01]  Chronic atrial fibrillation (H) [I48.20]             Anticoagulation Episode Summary      INR check location:      Preferred lab:      Send INR reminders to:  AIDA GUERRERO    Comments:  5mg tabs / CALENDAR / needs bridging. may leave DVM or speak with Paul per signed consent      Anticoagulation Care Providers     Provider Role Specialty Phone number    Obdulio Ingram MD Referring Internal Medicine [phone]

## 2021-04-28 NOTE — TELEPHONE ENCOUNTER
Pending Prescriptions:                       Disp   Refills    diazepam (VALIUM) 5 MG tablet [Pharmacy Me*60 tab*0        Sig: TAKE ONE TABLET BY MOUTH EVERY EIGHT HOURS AS NEEDED           FOR ANXIETY OR MUSCLE SPASM    Routing refill request to provider for review/approval because:  Drug not on the [facility] refill protocol

## 2021-04-28 NOTE — PROGRESS NOTES
Clinic Care Coordination Contact  Community Health Worker Initial Outreach        Patient accepts CC: No, wife (CTC on file) that Nicko has home care starting and no other needs. Lissy thanked me for the call.

## 2021-04-28 NOTE — TELEPHONE ENCOUNTER
Vanessa from [facility] care calling for verbal orders for skilled nursing  1 time a week for 1 week  2 times a week for 2 weeks  1 PRN  Ok to call and lm [phone]

## 2021-04-29 ENCOUNTER — TELEPHONE (OUTPATIENT)
Dept: INTERNAL MEDICINE | Facility: CLINIC | Age: 66
End: 2021-04-29

## 2021-04-29 NOTE — TELEPHONE ENCOUNTER
Hi ,  Please see below. Thank you.    [facility] now requests orders and shares plan of care/discharge summaries for some patients through [employer].  Please REPLY TO THIS MESSAGE OR ROUTE BACK TO THE AUTHOR in order to give authorization for orders when needed.  This is considered a verbal order, you will still receive a faxed copy of orders for signature.  Thank you for your assistance in improving collaboration for our patients.    Pt has open area measuring 4cm x 2.5 cm on graft site on upper left thigh. 100% Gran tissue. Minimal serosang, pink drainage. Requesting orders to cleanse wound with wound cleanser, apply xeroform then dry gauze and secure with tape 3 x weekly. Family to do on non nurse days. Thank you!    Vanessa Jones RN  [phone]

## 2021-04-30 ENCOUNTER — TELEPHONE (OUTPATIENT)
Dept: INTERNAL MEDICINE | Facility: CLINIC | Age: 66
End: 2021-04-30

## 2021-04-30 ENCOUNTER — ANTICOAGULATION THERAPY VISIT (OUTPATIENT)
Dept: INTERNAL MEDICINE | Facility: CLINIC | Age: 66
End: 2021-04-30

## 2021-04-30 ENCOUNTER — VIRTUAL VISIT (OUTPATIENT)
Dept: INTERNAL MEDICINE | Facility: CLINIC | Age: 66
End: 2021-04-30
Payer: COMMERCIAL

## 2021-04-30 DIAGNOSIS — I63.9 MULTIPLE CEREBRAL INFARCTIONS (H): ICD-10-CM

## 2021-04-30 DIAGNOSIS — F33.1 MAJOR DEPRESSIVE DISORDER, RECURRENT EPISODE, MODERATE (H): ICD-10-CM

## 2021-04-30 DIAGNOSIS — Z79.01 LONG TERM CURRENT USE OF ANTICOAGULANT THERAPY: ICD-10-CM

## 2021-04-30 DIAGNOSIS — D68.9 COAGULATION DEFECT, UNSPECIFIED (H): ICD-10-CM

## 2021-04-30 DIAGNOSIS — I48.20 CHRONIC ATRIAL FIBRILLATION (H): Primary | ICD-10-CM

## 2021-04-30 DIAGNOSIS — F10.21 ALCOHOL DEPENDENCE IN REMISSION (H): ICD-10-CM

## 2021-04-30 DIAGNOSIS — Z95.2 AORTIC VALVE PROSTHESIS PRESENT: ICD-10-CM

## 2021-04-30 DIAGNOSIS — I48.0 PAROXYSMAL ATRIAL FIBRILLATION (H): ICD-10-CM

## 2021-04-30 DIAGNOSIS — I48.20 CHRONIC ATRIAL FIBRILLATION (H): ICD-10-CM

## 2021-04-30 LAB — INR PPP: 3.7 (ref 0.9–1.1)

## 2021-04-30 PROCEDURE — 99214 OFFICE O/P EST MOD 30 MIN: CPT | Mod: 95 | Performed by: INTERNAL MEDICINE

## 2021-04-30 NOTE — PROGRESS NOTES
ANTICOAGULATION MANAGEMENT     Patient Name:  Todd S Aschoff  Date:  4/30/2021    ASSESSMENT /SUBJECTIVE:    Today's INR result of 3.7 is supratherapeutic. Goal INR of 2.5-3.5      Warfarin dose taken: Warfarin taken as instructed    Diet: No new diet changes affecting INR    Medication changes/ interactions: No new medications/supplements affecting INR    Previous INR: Supratherapeutic     S/S of bleeding or thromboembolism: No    New injury or illness: No    Upcoming surgery, procedure or cardioversion: No    Additional findings: Recent Hospital Discharge      PLAN:    Telephone call with home care nurse Vanessa regarding INR result and instructed:     Warfarin Dosing Instructions: 5 mg Fri Sun; 7.5 mg all other days    Instructed patient to follow up no later than: 3 days  Orders given to  Homecare nurse/facility to recheck    Education provided: Please call back if any changes to your diet, medications or how you've been taking warfarin, Monitoring for bleeding signs and symptoms and Monitoring for clotting signs and symptoms      Vanessa verbalizes understanding and agrees to warfarin dosing plan.    Instructed to call the Anticoagulation Clinic for any changes, questions or concerns. (#[phone])        Clare Samuels RN      OBJECTIVE:  Recent labs: (last 7 days)     04/24/21  0955 04/25/21  0826 04/26/21  0453 04/28/21 04/30/21   INR 2.38* 2.69* 2.84* 3.7* 3.7*         No question data found.  Anticoagulation Summary  As of 4/30/2021    INR goal:  2.5-3.5   TTR:  45.4 % (10.2 mo)   INR used for dosing:  3.7 (4/30/2021)   Warfarin maintenance plan:  7.5 mg (5 mg x 1.5) every day   Full warfarin instructions:  4/30: 5 mg; 5/1: 5 mg; 5/2: 5 mg; Otherwise 7.5 mg every day   Weekly warfarin total:  52.5 mg   Plan last modified:  Nova John, RN (4/27/2021)   Next INR check:  5/3/2021   Priority:  High   Target end date:  Indefinite    Indications    Aortic valve prosthesis present [Z95.2]  Atrial  fibrillation (H) [I48.91]  Long term current use of anticoagulant therapy [Z79.01]  Chronic atrial fibrillation (H) [I48.20]             Anticoagulation Episode Summary     INR check location:      Preferred lab:      Send INR reminders to:  AIDA GUERRERO    Comments:  5mg tabs / CALENDAR / needs bridging. may leave DVM or speak with Paul per signed consent      Anticoagulation Care Providers     Provider Role Specialty Phone number    Obdulio Ingram MD Referring Internal Medicine [phone]

## 2021-04-30 NOTE — PROGRESS NOTES
Nicko is a 65 year old who is being evaluated via a billable video visit.      How would you like to obtain your AVS? Mail a copy  If the video visit is dropped, the invitation should be resent by: Send to e-mail at: [email or web address]  Will anyone else be joining your video visit? No    Video Start Time: 1524    Assessment & Plan   (I63.9) Multiple cerebral infarctions (H)  (primary encounter diagnosis)  Comment: Continue with chronic oral anticoagulation. F/u with neurology as they advise. Continue PT/OT.     (I48.20) Chronic atrial fibrillation (H)   (D68.9) Coagulation defect, unspecified (H)  Comment: Continue chronic oral anticoagulation.     (F10.21) Alcohol dependence in remission (H)  Comment: maintaining sobriety.     (F33.1) Major depressive disorder, recurrent episode, moderate (H)  Comment: Advised him to identify and follow up with a primary mental health provider. He has had a history of severe major depression.     Obdulio Ingram MD,   [facility]   Nicko is a 65 year old who presents for the following health issues   Patient is being seen for an hospital follow up.  HPI       Hospital Follow-up Visit:    Hospital/Nursing Home/IP Rehab Facility: [facility]  Date of Admission: 04/18/2021  Date of Discharge: 04/26/2021  Reason(s) for Admission: Hospital Problem List       Cerebrovascular accident (CVA), unspecified mechanism (H)        Was your hospitalization related to COVID-19? No   Problems taking medications regularly:  None  Medication changes since discharge:   START taking these medications     Details   aspirin (ASA) 81 MG EC tablet Take 1 tablet (81 mg) by mouth daily  Qty: 90 tablet, Refills: 1     Associated Diagnoses: Cerebrovascular accident (CVA), unspecified mechanism (H)       atorvastatin (LIPITOR) 40 MG tablet Take 1 tablet (40 mg) by mouth every evening  Qty: 30 tablet, Refills: 1     Associated Diagnoses: Cerebrovascular  accident (CVA), unspecified mechanism (H)                 CONTINUE these medications which have CHANGED     Details   warfarin ANTICOAGULANT (COUMADIN) 2.5 MG tablet Take 3 tablets (7.5 mg) by mouth daily  Qty: 90 tablet, Refills: 0     Associated Diagnoses: Long term current use of anticoagulant therapy           Problems adhering to non-medication therapy:  None    Summary of hospitalization:  [facility] discharge summary reviewed  Diagnostic Tests/Treatments reviewed.  Follow up needed: none  Other Healthcare Providers Involved in Patient s Care:         Home Care RN, home PT/OT as needed. Neurology in 4-8 wks  Update since discharge: improved.     Post Discharge Medication Reconciliation: discharge medications reconciled and changed, per note/orders.  Plan of care communicated with patient          We reviewed the patient's hospitalization at [facility] from 4/18-4/26.   The patient presented to medical attention after noting weakness of three fingers on his right hand, as well as speech changes. . Symptoms subsequently resolved completely.   Workup included a subtherapeutic INR and a brain MRI showing small acute infarcts scattered throughout the left greater than right cerebral hemispheres.     He has received Home Care including PT and OT.   Recommendations included f/u with Neurology within 4-8 weeks , and a sleep study.     The patient had previously followed by Yadi Rivera with [facility] for depression. He is urged to follow up with a psychiatrist since she has gone on leave.     He was to see Dr Maxwell on 4/20/2021.     He indicates that he would like to transfer his care to another primary care provider.  He provides Jemma Gillette's name as someone he would like to see.     Past medical, family and social histories as well as medications reviewed and updated as needed.    Review of Systems   REVIEW OF SYSTEMS: The following systems have been completely reviewed and are negative except  as noted above:   Constitutional, respiratory, cardiovascular, musculoskeletal, dermatologic, psychiatric, and neurologic systems.         Objective           Vitals:  No vitals were obtained today due to virtual visit.    Physical Exam   GENERAL: Healthy, alert and no distress  EYES: Eyes grossly normal to inspection.  No discharge or erythema, or obvious scleral/conjunctival abnormalities.  RESP: No audible wheeze, cough, or visible cyanosis.  No visible retractions or increased work of breathing.    SKIN: Visible skin clear. No significant rash, abnormal pigmentation or lesions.  NEURO: Cranial nerves grossly intact.  Mentation and speech appropriate for age.  PSYCH: Mentation appears normal, affect normal/bright, judgement and insight intact, normal speech and appearance well-groomed.            Video-Visit Details    Type of service:  Video Visit    Video End Time: 1544    Originating Location (pt. Location): Home    Distant Location (provider location):  [facility]     Platform used for Video Visit: Itiva

## 2021-05-03 ENCOUNTER — ANTICOAGULATION THERAPY VISIT (OUTPATIENT)
Dept: INTERNAL MEDICINE | Facility: CLINIC | Age: 66
End: 2021-05-03

## 2021-05-03 ENCOUNTER — TELEPHONE (OUTPATIENT)
Dept: INTERNAL MEDICINE | Facility: CLINIC | Age: 66
End: 2021-05-03

## 2021-05-03 DIAGNOSIS — I48.0 PAROXYSMAL ATRIAL FIBRILLATION (H): ICD-10-CM

## 2021-05-03 DIAGNOSIS — Z95.2 AORTIC VALVE PROSTHESIS PRESENT: ICD-10-CM

## 2021-05-03 DIAGNOSIS — I48.20 CHRONIC ATRIAL FIBRILLATION (H): ICD-10-CM

## 2021-05-03 DIAGNOSIS — Z79.01 LONG TERM CURRENT USE OF ANTICOAGULANT THERAPY: ICD-10-CM

## 2021-05-03 LAB — INR PPP: 3.8 (ref 0.9–1.1)

## 2021-05-03 NOTE — PROGRESS NOTES
ANTICOAGULATION MANAGEMENT     Patient Name:  Todd S Aschoff  Date:  5/3/2021    ASSESSMENT /SUBJECTIVE:    Today's INR result of 3.8 is supratherapeutic. Goal INR of 2.5-3.5      Warfarin dose taken: Warfarin taken as instructed    Diet: No new diet changes affecting INR    Medication changes/ interactions: No new medications/supplements affecting INR    Previous INR: Supratherapeutic     S/S of bleeding or thromboembolism: No    New injury or illness: Yes: chronic L shoulder bursitis which is painful. Nicko usually gets injections for this but needs to wait due to recent hospitalization.    Upcoming surgery, procedure or cardioversion: No  Additional findings: None.No factors to explain elevated number. Pt likely needs a dose closer to his pre-hospital dose of 5mg daily.     PLAN:    Telephone call with home care nurse Vanessa regarding INR result and instructed:     Warfarin Dosing Instructions: Change your warfarin dose to 7.5mg every Tue, Thu, Sat; 5mg all other days  . (19 % change)    Instructed patient to follow up no later than: 05/07   Orders given to  Homecare nurse/facility to recheck    Education provided: Target INR goal and significance of current INR result      Vanessa verbalizes understanding and agrees to warfarin dosing plan.    Instructed to call the Anticoagulation Clinic for any changes, questions or concerns. (#[phone])        Oscar Briscoe RN      OBJECTIVE:  Recent labs: (last 7 days)     04/28/21 04/30/21 05/03/21   INR 3.7* 3.7* 3.8*         No question data found.  Anticoagulation Summary  As of 5/3/2021    INR goal:  2.5-3.5   TTR:  44.4 % (10.2 mo)   INR used for dosing:  3.8 (5/3/2021)   Warfarin maintenance plan:  5 mg (5 mg x 1) every Tue, Fri; 7.5 mg (5 mg x 1.5) all other days   Full warfarin instructions:  5/3: 2.5 mg; Otherwise 5 mg every Tue, Fri; 7.5 mg all other days   Weekly warfarin total:  47.5 mg   Plan last modified:  Oscar Briscoe, RN (5/3/2021)   Next INR check:   5/7/2021   Priority:  High   Target end date:  Indefinite    Indications    Aortic valve prosthesis present [Z95.2]  Atrial fibrillation (H) [I48.91]  Long term current use of anticoagulant therapy [Z79.01]  Chronic atrial fibrillation (H) [I48.20]             Anticoagulation Episode Summary     INR check location:      Preferred lab:  EXTERNAL LAB    Send INR reminders to:  AIDA MCWILLIAMS    Comments:  5mg tabs / CALENDAR / needs bridging. may leave DVM or speak with rey Miller signed consent // Home care      Anticoagulation Care Providers     Provider Role Specialty Phone number    Obdulio Ingram MD Referring Internal Medicine [phone]

## 2021-05-04 ENCOUNTER — TELEPHONE (OUTPATIENT)
Dept: INTERNAL MEDICINE | Facility: CLINIC | Age: 66
End: 2021-05-04

## 2021-05-04 DIAGNOSIS — G31.84 MILD COGNITIVE IMPAIRMENT: Primary | ICD-10-CM

## 2021-05-06 RX ORDER — MEMANTINE HYDROCHLORIDE 10 MG/1
10 TABLET ORAL 2 TIMES DAILY
Qty: 180 TABLET | Refills: 0 | Status: SHIPPED | OUTPATIENT
Start: 2021-05-06 | End: 2021-06-18

## 2021-05-06 NOTE — TELEPHONE ENCOUNTER
Pending Prescriptions:                       Disp   Refills    memantine (NAMENDA) 10 MG tablet                           Sig: Take 1 tablet (10 mg) by mouth 2 times daily    Routing refill request to provider for review/approval because:  Medication is reported/historical

## 2021-05-07 ENCOUNTER — ANTICOAGULATION THERAPY VISIT (OUTPATIENT)
Dept: INTERNAL MEDICINE | Facility: CLINIC | Age: 66
End: 2021-05-07

## 2021-05-07 DIAGNOSIS — I48.0 PAROXYSMAL ATRIAL FIBRILLATION (H): ICD-10-CM

## 2021-05-07 DIAGNOSIS — Z79.01 LONG TERM CURRENT USE OF ANTICOAGULANT THERAPY: ICD-10-CM

## 2021-05-07 DIAGNOSIS — I48.20 CHRONIC ATRIAL FIBRILLATION (H): ICD-10-CM

## 2021-05-07 DIAGNOSIS — Z95.2 AORTIC VALVE PROSTHESIS PRESENT: ICD-10-CM

## 2021-05-07 LAB — INR PPP: 3.5 (ref 0.9–1.1)

## 2021-05-07 RX ORDER — MEMANTINE HYDROCHLORIDE 10 MG/1
TABLET ORAL
Qty: 60 TABLET | Refills: 0 | OUTPATIENT
Start: 2021-05-07

## 2021-05-07 NOTE — PROGRESS NOTES
ANTICOAGULATION MANAGEMENT     Patient Name:  Todd S Aschoff  Date:  5/7/2021    ASSESSMENT /SUBJECTIVE:    Today's INR result of 3.5 is therapeutic. Goal INR of 2.5-3.5      Warfarin dose taken: Warfarin taken as instructed    Diet: No new diet changes affecting INR    Medication changes/ interactions: No new medications/supplements affecting INR    Previous INR: Supratherapeutic     S/S of bleeding or thromboembolism: No    New injury or illness: No    Upcoming surgery, procedure or cardioversion: No    Additional findings: None      PLAN:    Telephone call with home care nurse Jaycee regarding INR result and instructed:     Warfarin Dosing Instructions: Continue your current warfarin dose 7.5mg Tue/Thu/Sat & 5mg AOD    Instructed patient to follow up no later than: 5/11 (home care day)  Orders given to  Homecare nurse/facility to recheck    Education provided: Contact [facility]tico[facility]: [phone]  with any changes, questions or concerns.       Vanessa BOOTHE verbalizes understanding and agrees to warfarin dosing plan.    Instructed to call the Anticoagulation Clinic for any changes, questions or concerns. (#[phone])        Linda Tang RN      OBJECTIVE:  Recent labs: (last 7 days)     05/03/21 05/07/21   INR 3.8* 3.5*         No question data found.  Anticoagulation Summary  As of 5/7/2021    INR goal:  2.5-3.5   TTR:  43.1 % (10.2 mo)   INR used for dosing:  3.5 (5/7/2021)   Warfarin maintenance plan:  7.5 mg (5 mg x 1.5) every Tue, Thu, Sat; 5 mg (5 mg x 1) all other days   Full warfarin instructions:  7.5 mg every Tue, Thu, Sat; 5 mg all other days   Weekly warfarin total:  42.5 mg   No change documented:  Linda Tang, RN   Plan last modified:  Oscar Briscoe RN (5/3/2021)   Next INR check:  5/11/2021   Priority:  High   Target end date:  Indefinite    Indications    Aortic valve prosthesis present [Z95.2]  Atrial fibrillation (H) [I48.91]  Long term current use of anticoagulant  therapy [Z79.01]  Chronic atrial fibrillation (H) [I48.20]             Anticoagulation Episode Summary     INR check location:      Preferred lab:  EXTERNAL LAB    Send INR reminders to:  AIDA MCWILLIAMS    Comments:  5mg tabs / CALENDAR / needs bridging. may leave DVM or speak with Paul per signed consent // Home care      Anticoagulation Care Providers     Provider Role Specialty Phone number    Obdulio Ingram MD Referring Internal Medicine [phone]         Linda Morales RN, BSN, PHN

## 2021-05-10 ENCOUNTER — ANTICOAGULATION THERAPY VISIT (OUTPATIENT)
Dept: INTERNAL MEDICINE | Facility: CLINIC | Age: 66
End: 2021-05-10

## 2021-05-10 ENCOUNTER — TELEPHONE (OUTPATIENT)
Dept: INTERNAL MEDICINE | Facility: CLINIC | Age: 66
End: 2021-05-10

## 2021-05-10 DIAGNOSIS — I48.20 CHRONIC ATRIAL FIBRILLATION (H): ICD-10-CM

## 2021-05-10 DIAGNOSIS — Z95.2 AORTIC VALVE PROSTHESIS PRESENT: ICD-10-CM

## 2021-05-10 DIAGNOSIS — Z79.01 LONG TERM CURRENT USE OF ANTICOAGULANT THERAPY: ICD-10-CM

## 2021-05-10 DIAGNOSIS — I48.0 PAROXYSMAL ATRIAL FIBRILLATION (H): ICD-10-CM

## 2021-05-10 LAB — INR PPP: 2.4 (ref 0.9–1.1)

## 2021-05-10 NOTE — TELEPHONE ENCOUNTER
Left voice message for Vanessa with verbal authorization given for home care orders per [facility] protocol.

## 2021-05-10 NOTE — TELEPHONE ENCOUNTER
Vanessa from [facility]( [phone]) calls for Skilled Nursing orders.  2wk for 3 wks and 1wk for 5 wks.  OK to leave a detailed message.

## 2021-05-11 ENCOUNTER — TELEPHONE (OUTPATIENT)
Dept: INTERNAL MEDICINE | Facility: CLINIC | Age: 66
End: 2021-05-11

## 2021-05-12 ENCOUNTER — MEDICAL CORRESPONDENCE (OUTPATIENT)
Dept: HEALTH INFORMATION MANAGEMENT | Facility: CLINIC | Age: 66
End: 2021-05-12

## 2021-05-14 ENCOUNTER — ANTICOAGULATION THERAPY VISIT (OUTPATIENT)
Dept: INTERNAL MEDICINE | Facility: CLINIC | Age: 66
End: 2021-05-14

## 2021-05-14 DIAGNOSIS — Z79.01 LONG TERM CURRENT USE OF ANTICOAGULANT THERAPY: ICD-10-CM

## 2021-05-14 DIAGNOSIS — Z95.2 AORTIC VALVE PROSTHESIS PRESENT: ICD-10-CM

## 2021-05-14 DIAGNOSIS — I48.0 PAROXYSMAL ATRIAL FIBRILLATION (H): ICD-10-CM

## 2021-05-14 DIAGNOSIS — I48.20 CHRONIC ATRIAL FIBRILLATION (H): ICD-10-CM

## 2021-05-14 LAB — INR PPP: 3.3 (ref 0.9–1.1)

## 2021-05-14 NOTE — PROGRESS NOTES
ANTICOAGULATION MANAGEMENT     Patient Name:  Todd S Aschoff  Date:  5/14/2021    ASSESSMENT /SUBJECTIVE:    Today's INR result of 3.3 is therapeutic. Goal INR of 2.5-3.5      Warfarin dose taken: Warfarin taken as instructed    Diet: No new diet changes affecting INR    Medication changes/ interactions: No new medications/supplements affecting INR    Previous INR: Subtherapeutic     S/S of bleeding or thromboembolism: No    New injury or illness: No    Upcoming surgery, procedure or cardioversion: No    Additional findings: None      PLAN:    Telephone call with home care nurse Vanessa regarding INR result and instructed:     Warfarin Dosing Instructions: Continue your current warfarin dose 5 mg every Sun, Wed, Fri; 7.5 mg all other days    Instructed patient to follow up no later than: 1 week  Orders given to  Homecare nurse/facility to recheck    Education provided: Please call back if any changes to your diet, medications or how you've been taking warfarin, Monitoring for bleeding signs and symptoms and Monitoring for clotting signs and symptoms      Vanessa verbalizes understanding and agrees to warfarin dosing plan.    Instructed to call the Anticoagulation Clinic for any changes, questions or concerns. (#[phone])        Clare Samuels RN      OBJECTIVE:  Recent labs: (last 7 days)     05/10/21 05/14/21   INR 2.4* 3.3*         No question data found.  Anticoagulation Summary  As of 5/14/2021    INR goal:  2.5-3.5   TTR:  42.9 % (10.2 mo)   INR used for dosing:  3.3 (5/14/2021)   Warfarin maintenance plan:  5 mg (5 mg x 1) every Sun, Wed, Fri; 7.5 mg (5 mg x 1.5) all other days   Full warfarin instructions:  5 mg every Sun, Wed, Fri; 7.5 mg all other days   Weekly warfarin total:  45 mg   No change documented:  Clare Samuels RN   Plan last modified:  Linda Tang RN (5/10/2021)   Next INR check:  5/21/2021   Priority:  High   Target end date:  Indefinite    Indications    Aortic valve prosthesis  present [Z95.2]  Atrial fibrillation (H) [I48.91]  Long term current use of anticoagulant therapy [Z79.01]  Chronic atrial fibrillation (H) [I48.20]             Anticoagulation Episode Summary     INR check location:      Preferred lab:  EXTERNAL LAB    Send INR reminders to:  AIDA MCWILLIAMS    Comments:  5mg tabs / CALENDAR / needs bridging. may leave DVM or speak with Paul, per signed consent // Home care      Anticoagulation Care Providers     Provider Role Specialty Phone number    Obdulio Ingram MD Referring Internal Medicine [phone]

## 2021-05-17 ENCOUNTER — APPOINTMENT (OUTPATIENT)
Dept: MRI IMAGING | Facility: CLINIC | Age: 66
End: 2021-05-17
Attending: PHYSICIAN ASSISTANT
Payer: COMMERCIAL

## 2021-05-17 ENCOUNTER — APPOINTMENT (OUTPATIENT)
Dept: SPEECH THERAPY | Facility: CLINIC | Age: 66
End: 2021-05-17
Attending: PHYSICIAN ASSISTANT
Payer: COMMERCIAL

## 2021-05-17 ENCOUNTER — APPOINTMENT (OUTPATIENT)
Dept: CT IMAGING | Facility: CLINIC | Age: 66
End: 2021-05-17
Attending: EMERGENCY MEDICINE
Payer: COMMERCIAL

## 2021-05-17 ENCOUNTER — HOSPITAL ENCOUNTER (INPATIENT)
Facility: CLINIC | Age: 66
LOS: 1 days | Discharge: HOME | End: 2021-05-19
Attending: EMERGENCY MEDICINE | Admitting: HOSPITALIST
Payer: COMMERCIAL

## 2021-05-17 DIAGNOSIS — I63.9 CEREBROVASCULAR ACCIDENT (CVA), UNSPECIFIED MECHANISM (H): ICD-10-CM

## 2021-05-17 DIAGNOSIS — E78.5 HYPERLIPIDEMIA LDL GOAL <130: Primary | ICD-10-CM

## 2021-05-17 LAB
ANION GAP SERPL CALCULATED.3IONS-SCNC: 1 MMOL/L (ref 3–14)
APTT PPP: 44 SEC (ref 22–37)
BASOPHILS # BLD AUTO: 0 10E9/L (ref 0–0.2)
BASOPHILS NFR BLD AUTO: 0.5 %
BUN SERPL-MCNC: 16 MG/DL (ref 7–30)
CALCIUM SERPL-MCNC: 8.7 MG/DL (ref 8.5–10.1)
CHLORIDE SERPL-SCNC: 107 MMOL/L (ref 94–109)
CO2 SERPL-SCNC: 34 MMOL/L (ref 20–32)
CREAT SERPL-MCNC: 1.14 MG/DL (ref 0.66–1.25)
DIFFERENTIAL METHOD BLD: NORMAL
EOSINOPHIL # BLD AUTO: 0.2 10E9/L (ref 0–0.7)
EOSINOPHIL NFR BLD AUTO: 2.4 %
ERYTHROCYTE [DISTWIDTH] IN BLOOD BY AUTOMATED COUNT: 14.8 % (ref 10–15)
GFR SERPL CREATININE-BSD FRML MDRD: 67 ML/MIN/{1.73_M2}
GLUCOSE SERPL-MCNC: 118 MG/DL (ref 70–99)
HCT VFR BLD AUTO: 41.8 % (ref 40–53)
HGB BLD-MCNC: 13.8 G/DL (ref 13.3–17.7)
IMM GRANULOCYTES # BLD: 0 10E9/L (ref 0–0.4)
IMM GRANULOCYTES NFR BLD: 0.3 %
INR PPP: 2.87 (ref 0.86–1.14)
INTERPRETATION ECG - MUSE: NORMAL
LABORATORY COMMENT REPORT: NORMAL
LYMPHOCYTES # BLD AUTO: 1.6 10E9/L (ref 0.8–5.3)
LYMPHOCYTES NFR BLD AUTO: 25.8 %
MCH RBC QN AUTO: 29.7 PG (ref 26.5–33)
MCHC RBC AUTO-ENTMCNC: 33 G/DL (ref 31.5–36.5)
MCV RBC AUTO: 90 FL (ref 78–100)
MONOCYTES # BLD AUTO: 1 10E9/L (ref 0–1.3)
MONOCYTES NFR BLD AUTO: 15.4 %
NEUTROPHILS # BLD AUTO: 3.5 10E9/L (ref 1.6–8.3)
NEUTROPHILS NFR BLD AUTO: 55.6 %
NRBC # BLD AUTO: 0 10*3/UL
NRBC BLD AUTO-RTO: 0 /100
PLATELET # BLD AUTO: 235 10E9/L (ref 150–450)
POTASSIUM SERPL-SCNC: 3.6 MMOL/L (ref 3.4–5.3)
RBC # BLD AUTO: 4.64 10E12/L (ref 4.4–5.9)
SARS-COV-2 RNA RESP QL NAA+PROBE: NEGATIVE
SODIUM SERPL-SCNC: 142 MMOL/L (ref 133–144)
SPECIMEN SOURCE: NORMAL
TROPONIN I SERPL-MCNC: <0.015 UG/L (ref 0–0.04)
WBC # BLD AUTO: 6.3 10E9/L (ref 4–11)

## 2021-05-17 PROCEDURE — G0378 HOSPITAL OBSERVATION PER HR: HCPCS

## 2021-05-17 PROCEDURE — 87635 SARS-COV-2 COVID-19 AMP PRB: CPT | Performed by: EMERGENCY MEDICINE

## 2021-05-17 PROCEDURE — 92610 EVALUATE SWALLOWING FUNCTION: CPT | Mod: GN | Performed by: SPEECH-LANGUAGE PATHOLOGIST

## 2021-05-17 PROCEDURE — 99291 CRITICAL CARE FIRST HOUR: CPT | Mod: GC | Performed by: PSYCHIATRY & NEUROLOGY

## 2021-05-17 PROCEDURE — 250N000013 HC RX MED GY IP 250 OP 250 PS 637: Performed by: PHYSICIAN ASSISTANT

## 2021-05-17 PROCEDURE — 80048 BASIC METABOLIC PNL TOTAL CA: CPT | Performed by: EMERGENCY MEDICINE

## 2021-05-17 PROCEDURE — 99220 PR INITIAL OBSERVATION CARE,LEVEL III: CPT | Performed by: HOSPITALIST

## 2021-05-17 PROCEDURE — 84484 ASSAY OF TROPONIN QUANT: CPT | Performed by: EMERGENCY MEDICINE

## 2021-05-17 PROCEDURE — A9585 GADOBUTROL INJECTION: HCPCS | Performed by: HOSPITALIST

## 2021-05-17 PROCEDURE — 250N000013 HC RX MED GY IP 250 OP 250 PS 637: Performed by: HOSPITALIST

## 2021-05-17 PROCEDURE — C9803 HOPD COVID-19 SPEC COLLECT: HCPCS

## 2021-05-17 PROCEDURE — 93005 ELECTROCARDIOGRAM TRACING: CPT

## 2021-05-17 PROCEDURE — 250N000011 HC RX IP 250 OP 636: Performed by: EMERGENCY MEDICINE

## 2021-05-17 PROCEDURE — 85610 PROTHROMBIN TIME: CPT | Performed by: EMERGENCY MEDICINE

## 2021-05-17 PROCEDURE — 70496 CT ANGIOGRAPHY HEAD: CPT

## 2021-05-17 PROCEDURE — 70450 CT HEAD/BRAIN W/O DYE: CPT

## 2021-05-17 PROCEDURE — 99291 CRITICAL CARE FIRST HOUR: CPT | Mod: 25

## 2021-05-17 PROCEDURE — 70553 MRI BRAIN STEM W/O & W/DYE: CPT

## 2021-05-17 PROCEDURE — 250N000009 HC RX 250: Performed by: EMERGENCY MEDICINE

## 2021-05-17 PROCEDURE — 85025 COMPLETE CBC W/AUTO DIFF WBC: CPT | Performed by: EMERGENCY MEDICINE

## 2021-05-17 PROCEDURE — 85730 THROMBOPLASTIN TIME PARTIAL: CPT | Performed by: EMERGENCY MEDICINE

## 2021-05-17 PROCEDURE — 255N000002 HC RX 255 OP 636: Performed by: HOSPITALIST

## 2021-05-17 RX ORDER — ACETAMINOPHEN 325 MG/1
650 TABLET ORAL EVERY 4 HOURS PRN
Status: DISCONTINUED | OUTPATIENT
Start: 2021-05-17 | End: 2021-05-19 | Stop reason: HOSPADM

## 2021-05-17 RX ORDER — LIOTHYRONINE SODIUM 25 UG/1
25 TABLET ORAL EVERY EVENING
Status: DISCONTINUED | OUTPATIENT
Start: 2021-05-17 | End: 2021-05-19 | Stop reason: HOSPADM

## 2021-05-17 RX ORDER — POLYETHYLENE GLYCOL 3350 17 G/17G
17 POWDER, FOR SOLUTION ORAL DAILY PRN
Status: DISCONTINUED | OUTPATIENT
Start: 2021-05-17 | End: 2021-05-19 | Stop reason: HOSPADM

## 2021-05-17 RX ORDER — WARFARIN SODIUM 2.5 MG/1
5 TABLET ORAL
Status: ON HOLD | COMMUNITY
End: 2021-06-04

## 2021-05-17 RX ORDER — AMOXICILLIN 250 MG
1 CAPSULE ORAL 2 TIMES DAILY PRN
Status: DISCONTINUED | OUTPATIENT
Start: 2021-05-17 | End: 2021-05-19 | Stop reason: HOSPADM

## 2021-05-17 RX ORDER — MIRABEGRON 50 MG/1
50 TABLET, EXTENDED RELEASE ORAL EVERY EVENING
Status: DISCONTINUED | OUTPATIENT
Start: 2021-05-17 | End: 2021-05-19 | Stop reason: HOSPADM

## 2021-05-17 RX ORDER — LEVOTHYROXINE SODIUM 75 UG/1
150 TABLET ORAL EVERY EVENING
Status: DISCONTINUED | OUTPATIENT
Start: 2021-05-17 | End: 2021-05-19 | Stop reason: HOSPADM

## 2021-05-17 RX ORDER — DONEPEZIL HYDROCHLORIDE 10 MG/1
20 TABLET, FILM COATED ORAL AT BEDTIME
Status: DISCONTINUED | OUTPATIENT
Start: 2021-05-17 | End: 2021-05-19 | Stop reason: HOSPADM

## 2021-05-17 RX ORDER — LAMOTRIGINE 25 MG/1
25 TABLET ORAL EVERY EVENING
Status: DISCONTINUED | OUTPATIENT
Start: 2021-05-17 | End: 2021-05-19 | Stop reason: HOSPADM

## 2021-05-17 RX ORDER — GUANFACINE 1 MG/1
1 TABLET ORAL AT BEDTIME
Status: DISCONTINUED | OUTPATIENT
Start: 2021-05-17 | End: 2021-05-19 | Stop reason: HOSPADM

## 2021-05-17 RX ORDER — ACETAMINOPHEN 650 MG/1
650 SUPPOSITORY RECTAL EVERY 4 HOURS PRN
Status: DISCONTINUED | OUTPATIENT
Start: 2021-05-17 | End: 2021-05-19 | Stop reason: HOSPADM

## 2021-05-17 RX ORDER — GADOBUTROL 604.72 MG/ML
14 INJECTION INTRAVENOUS ONCE
Status: COMPLETED | OUTPATIENT
Start: 2021-05-17 | End: 2021-05-17

## 2021-05-17 RX ORDER — VENLAFAXINE HYDROCHLORIDE 150 MG/1
300 CAPSULE, EXTENDED RELEASE ORAL DAILY
Status: DISCONTINUED | OUTPATIENT
Start: 2021-05-17 | End: 2021-05-19 | Stop reason: HOSPADM

## 2021-05-17 RX ORDER — CETIRIZINE HYDROCHLORIDE 10 MG/1
10 TABLET ORAL EVERY MORNING
Status: DISCONTINUED | OUTPATIENT
Start: 2021-05-18 | End: 2021-05-19 | Stop reason: HOSPADM

## 2021-05-17 RX ORDER — ASPIRIN 81 MG/1
81 TABLET ORAL DAILY
Status: DISCONTINUED | OUTPATIENT
Start: 2021-05-17 | End: 2021-05-19 | Stop reason: HOSPADM

## 2021-05-17 RX ORDER — ONDANSETRON 4 MG/1
4 TABLET, ORALLY DISINTEGRATING ORAL EVERY 6 HOURS PRN
Status: DISCONTINUED | OUTPATIENT
Start: 2021-05-17 | End: 2021-05-19 | Stop reason: HOSPADM

## 2021-05-17 RX ORDER — FOLIC ACID 1 MG/1
5 TABLET ORAL DAILY
Status: DISCONTINUED | OUTPATIENT
Start: 2021-05-18 | End: 2021-05-19 | Stop reason: HOSPADM

## 2021-05-17 RX ORDER — ATORVASTATIN CALCIUM 40 MG/1
40 TABLET, FILM COATED ORAL EVERY EVENING
Status: DISCONTINUED | OUTPATIENT
Start: 2021-05-17 | End: 2021-05-18

## 2021-05-17 RX ORDER — LIDOCAINE 40 MG/G
CREAM TOPICAL
Status: DISCONTINUED | OUTPATIENT
Start: 2021-05-17 | End: 2021-05-19 | Stop reason: HOSPADM

## 2021-05-17 RX ORDER — PREGABALIN 50 MG/1
100 CAPSULE ORAL 3 TIMES DAILY
Status: DISCONTINUED | OUTPATIENT
Start: 2021-05-17 | End: 2021-05-19 | Stop reason: HOSPADM

## 2021-05-17 RX ORDER — PROPAFENONE HYDROCHLORIDE 150 MG/1
150 TABLET, COATED ORAL EVERY 8 HOURS
Status: DISCONTINUED | OUTPATIENT
Start: 2021-05-17 | End: 2021-05-19 | Stop reason: HOSPADM

## 2021-05-17 RX ORDER — IOPAMIDOL 755 MG/ML
70 INJECTION, SOLUTION INTRAVASCULAR ONCE
Status: COMPLETED | OUTPATIENT
Start: 2021-05-17 | End: 2021-05-17

## 2021-05-17 RX ORDER — AMOXICILLIN 250 MG
2 CAPSULE ORAL 2 TIMES DAILY PRN
Status: DISCONTINUED | OUTPATIENT
Start: 2021-05-17 | End: 2021-05-19 | Stop reason: HOSPADM

## 2021-05-17 RX ORDER — ONDANSETRON 2 MG/ML
4 INJECTION INTRAMUSCULAR; INTRAVENOUS EVERY 6 HOURS PRN
Status: DISCONTINUED | OUTPATIENT
Start: 2021-05-17 | End: 2021-05-19 | Stop reason: HOSPADM

## 2021-05-17 RX ORDER — CETIRIZINE HYDROCHLORIDE 10 MG/1
10 TABLET ORAL EVERY MORNING
COMMUNITY
End: 2022-02-08

## 2021-05-17 RX ORDER — MEMANTINE HYDROCHLORIDE 10 MG/1
10 TABLET ORAL 2 TIMES DAILY
Status: DISCONTINUED | OUTPATIENT
Start: 2021-05-17 | End: 2021-05-19 | Stop reason: HOSPADM

## 2021-05-17 RX ORDER — DIAZEPAM 5 MG
5 TABLET ORAL EVERY 8 HOURS PRN
Status: DISCONTINUED | OUTPATIENT
Start: 2021-05-17 | End: 2021-05-19 | Stop reason: HOSPADM

## 2021-05-17 RX ORDER — WARFARIN SODIUM 2.5 MG/1
7.5 TABLET ORAL
Status: ON HOLD | COMMUNITY
End: 2021-06-04

## 2021-05-17 RX ADMIN — LEVOTHYROXINE SODIUM 150 MCG: 75 TABLET ORAL at 20:32

## 2021-05-17 RX ADMIN — ACETAMINOPHEN 650 MG: 325 TABLET, FILM COATED ORAL at 22:45

## 2021-05-17 RX ADMIN — MEMANTINE 10 MG: 10 TABLET ORAL at 20:32

## 2021-05-17 RX ADMIN — ATORVASTATIN CALCIUM 40 MG: 40 TABLET, FILM COATED ORAL at 20:31

## 2021-05-17 RX ADMIN — LIOTHYRONINE SODIUM 25 MCG: 25 TABLET ORAL at 20:31

## 2021-05-17 RX ADMIN — PROPAFENONE HYDROCHLORIDE 150 MG: 150 TABLET, FILM COATED ORAL at 22:40

## 2021-05-17 RX ADMIN — IOPAMIDOL 70 ML: 755 INJECTION, SOLUTION INTRAVENOUS at 10:39

## 2021-05-17 RX ADMIN — MIRABEGRON 50 MG: 50 TABLET, FILM COATED, EXTENDED RELEASE ORAL at 20:32

## 2021-05-17 RX ADMIN — ASPIRIN 81 MG: 81 TABLET, COATED ORAL at 17:13

## 2021-05-17 RX ADMIN — LAMOTRIGINE 25 MG: 25 TABLET ORAL at 20:32

## 2021-05-17 RX ADMIN — PREGABALIN 100 MG: 50 CAPSULE ORAL at 20:31

## 2021-05-17 RX ADMIN — SODIUM CHLORIDE 100 ML: 9 INJECTION, SOLUTION INTRAVENOUS at 10:40

## 2021-05-17 RX ADMIN — GADOBUTROL 14 ML: 604.72 INJECTION INTRAVENOUS at 20:09

## 2021-05-17 RX ADMIN — PROPAFENONE HYDROCHLORIDE 150 MG: 150 TABLET, FILM COATED ORAL at 17:13

## 2021-05-17 RX ADMIN — DONEPEZIL HYDROCHLORIDE 20 MG: 10 TABLET ORAL at 22:40

## 2021-05-17 RX ADMIN — WARFARIN SODIUM 7.5 MG: 5 TABLET ORAL at 17:13

## 2021-05-17 RX ADMIN — GUANFACINE 1 MG: 1 TABLET ORAL at 22:40

## 2021-05-17 ASSESSMENT — MIFFLIN-ST. JEOR: SCORE: 2259.1

## 2021-05-17 ASSESSMENT — ENCOUNTER SYMPTOMS
WEAKNESS: 1
NUMBNESS: 1

## 2021-05-17 NOTE — PROVIDER NOTIFICATION
Provider Notification    Notified Person: PA    Notified Person Name: Jamshid Weber    Notification Date/Time: 5/17 @ 1600    Notification Interaction: Web page    Purpose of Notification: Brain MRI?

## 2021-05-17 NOTE — PROGRESS NOTES
CLINICAL SWALLOW EVALUATION        05/17/21 2375   General Information   Onset of Illness/Injury or Date of Surgery 05/17/21   Referring Physician Dr. Jamshid Weber    Patient/Family Therapy Goal Statement (SLP) Patient would like to drink water.    Pertinent History of Current Problem Patient admitted with lip numbness (upper and lower), CT negative for new CVA. PMH is significant for Aortic valve Dz s/p mechanical valve replacement on chronic coumadin, Atrial fib, HLP, Hypothyroidism, Obesity, Dementia, MDD with anxiety, Overactive Bladder and Possible Seizure D/O, Recent history of CVA (1/2021).   General Observations Patient is very talkative, pleasant.  Reports minimal lip numbness remains.  He denies speech or swallowing difficulty since the onset of these symptoms.    Past History of Dysphagia None noted per EMR during CVA and seizure related hospitalizations   Type of Evaluation   Type of Evaluation Swallow Evaluation   Oral Motor   Oral Musculature anomalies present   Structural Abnormalities none present   Mucosal Quality good   Dentition (Oral Motor)   Dentition (Oral Motor) natural dentition   Facial Symmetry (Oral Motor)   Facial Symmetry (Oral Motor) WNL   Lip Function (Oral Motor)   Lip Range of Motion (Oral Motor) WNL   Lip Sensitivity (Oral Motor) right upper lip decreased;left upper lip decreased;left lower lip decreased;right lower lip decreased  (Minimally numb per patient)   Comment, Lip Function (Oral Motor) He reports he can feel his lips better when he bites them now   Tongue Function (Oral Motor)   Tongue ROM (Oral Motor) WNL   Jaw Function (Oral Motor)   Jaw Function (Oral Motor) WNL   Cough/Swallow/Gag Reflex (Oral Motor)   Soft Palate/Velum (Oral Motor) WNL   Gag Reflex (Oral Motor) WNL   Volitional Swallow (Oral Motor) WNL   Vocal Quality/Secretion Management (Oral Motor)   Vocal Quality (Oral Motor) WNL   General Swallowing Observations   Current Diet/Method of Nutritional Intake  (General Swallowing Observations, NIS) NPO   Respiratory Support (General Swallowing Observations) none   Swallowing Evaluation Clinical swallow evaluation   Clinical Swallow Evaluation   Feeding Assistance set up only required   Additional evaluation(s) completed today No   Clinical Swallow Evaluation Textures Trialed Thin Liquids;Puree Textures;Solid Foods   Clinical Swallow Eval: Thin Liquid Texture Trial   Mode of Presentation, Thin Liquids cup;self-fed   Volume of Liquid or Food Presented 4 oz   Oral Phase of Swallow WFL   Pharyngeal Phase of Swallow intact   Diagnostic Statement No overt Sx of aspiration, patient reports he can feel cold now   Clinical Swallow Evaluation: Puree Solid Texture Trial   Mode of Presentation, Puree spoon;self-fed   Volume of Puree Presented 4 oz   Oral Phase, Puree WFL   Pharyngeal Phase, Puree intact   Diagnostic Statement No overt Sx of aspiration   Clinical Swallow Evaluation: Solid Food Texture Trial   Mode of Presentation, Solid self-fed   Volume of Solid Food Presented 2 crackers   Oral Phase, Solid WFL   Pharyngeal Phase, Solid intact   Diagnostic Statement No overt Sx of aspiration   Esophageal Phase of Swallow   Esophageal comments No esophageal dysphagia sx reported or hx noted per EMR   Swallowing Recommendations   Diet Consistency Recommendations regular diet;thin liquids   Supervision Level for Intake patient independent   Mode of Delivery Recommendations bolus size, small   Recommended Feeding/Eating Techniques (Swallow Eval) patient is independent, no specific recommendations;maintain upright sitting position for eating;maintain upright posture during/after eating for 30 minutes   Medication Administration Recommendations, Swallowing (SLP) Single pills with preferred carrier (water)   SLP Therapy Assessment/Plan   Criteria for Skilled Therapeutic Interventions Met (SLP Eval) no problems identified which require skilled intervention   SLP Diagnosis Functional  oropharyngeal swallow   Rehab Potential (SLP Eval) good, to achieve stated therapy goals   Therapy Frequency (SLP Eval) evaluation only   Comment, Therapy Assessment/Plan (SLP) Patient presents with functional swallow and safe tolerance of liquids, purees, and solids when alert and upright despite recent lip numbness.  Patient's speech is intelligible at the conversational level and he does not appear to have word finding difficulties with personal and medical information expressed today.    Therapy Plan Review/Discharge Plan (SLP)   Therapy Plan Review (SLP) evaluation/treatment results reviewed   SLP Discharge Planning    SLP Discharge Recommendation (DC Rec) home   SLP Rationale for DC Rec Home per SLP needs, will defer other safety and mobility needs to other care team   SLP Brief overview of current status  No skilled SLP services indicated following clinical swallow evaluation: recommend regular diet with thin liquids.  Speech and basic language are functional as informally assessed today.     Total Evaluation Time   Total Evaluation Time (Minutes) 45

## 2021-05-17 NOTE — H&P
[facility]    History and Physical  Hospitalist       Date of Admission:  5/17/2021  Date of Service (when I saw the patient): 05/17/21    Assessment & Plan   Todd S Aschoff is a 65 year old male with PMH Aortic valve Dz s/p mechanical valve replacement on chronic coumadin, Atrial fib, HLP, Hypothyroidism, Obesity, Dementia, MDD with anxiety, overactive bladder and possible seizure disorder with recent history of CVA (1/2021) and (4/2021).  Patient presenting now with sudden onset of numbness to upper and lower lips as well as tingling in the left thumb, index, and middle finger at 0930 on 5/17/2021.  Patient is registered to observation for further CVA/TIA work-up.    Acute numbness/tingling of lips with left upper extremity numbness/tingling and weakness, concerning for recurrent CVA/TIA  Recent CVA, admitted 4/18-4/26  Patient recently admitted to hospital 4/18-4/26 with right extremity weakness.  At that time, Head CT and CTA Head/Neck negative for hemorrhage or LVO.  Brain MRI 4/18/2021 confirmed small acute infarcts scattered throughout the left greater than the right cerebral hemisphere, multiple old infarcts.  INR was subtherapeutic at that time, but patient was started on Lovenox and eventually bridged back to warfarin.  Echocardiogram showed EF of 55-60%, RV normal size and function.  Noted mech aortic valve that is poorly visualized.  Negative bubble study.  Similar findings when compared to previous ECHO 1/2021.      Patient has reportedly been doing well since that time, but developed sudden onset of numbness and tingling to his upper and lower lips this morning around 0 930 as well as tingling of the left thumb, index, and middle finger.  He also reported weakness in his left leg that started at the same time.  His symptoms are similar to his previous CVA but on the opposite side now.  EMS notes that the patient had an unsteady gait.  Blood glucose 159 by EMS.  Here in the ER  code stroke was initiated.  Neurology evaluated the patient.  Patient is not a candidate for thrombolytics given INR greater than 1.7.  CT/CTA negative for LVO.  Vital signs are stable.  /82, heart rate 70 bpm, temperature 98, satting in the 90s on room air.  INR therapeutic at 2.87.  Troponin negative.  Routine CBC and BMP unremarkable.    --Neurology consulted  --Continue daily aspirin  --Continue warfarin, with INR goal 2.5-3.5  --Brain MRI to evaluate for stroke  --No need to repeat echo at this time  --Permissive hypertension  --Telemetry  --PT/OT/SLP    History of infected left lower extremity hematoma with cellulitis  S/p I&D (1/10, 1/26 with wound vac) and skin graft (2/3)  S/p evacuation of infected hematoma and split thickness skin graft 1/2021.  --This appears to be well-healed.  No evidence for rash/cellulitis.  --On last admission, left LE Venous US negative for DVT and thin crescent shaped fluid collection along the left calf representing known hematoma.     Mechanical Aortic Valve  --Continue warfarin with pharmacy to dose.  INR goal 2.5 to 3.5     Seizure D/O, potential  Follows with Neurology as an outpatient and during previous admission 1/2021 where incidental right frontal CVA found patient was also started on Lamictal 25 mg/d for potential seizure.    - Continue PTA Lamictal     HLP: Managed PTA with simvastatin   on prior admission, patient switched from simvastatin to atorvastatin.    --Will continue Lipitor.     Obesity  Body mass index is Body mass index is 39.61 kg/m .    Increase in all-cause morbidity and mortality.   - Recommend sleep study as an outpatient.          Diet: Combination Diet Regular Diet Adult; Thin Liquids (water, ice chips, juice, milk, gelatin, ice cream, etc)     DVT Prophylaxis: Warfarin   Zimmer Catheter: not present  Code Status: Full Code  Disposition Plan    Expected discharge: Likely in 1-2 days pending neurology input, MRI of brain, as well as  PT/OT/SLP eval.  Will have SW/CC to evaluate.    Entered: ALISSON Barron 05/17/2021, 11:59 AM        The patient's care was discussed with the Attending Physician, Dr. Whittington, Bedside Nurse and Patient.    The patient has been discussed with Dr. Whittington, who agrees with the assessment and plan at this time.      Jamshid Weber    Primary Care Physician   Dr. Obdulio Ingram MD    Chief Complaint   Numbness and weakness     History is obtained from the patient    History of Present Illness   Todd S Aschoff is a 65 year old male with PMH Aortic valve disease s/p mechanical valve replacement on chronic coumadin, atrial fibrillation, hyperlipidemia, Hypothyroidism, Obesity, Dementia, MDD with anxiety, Overactive Bladder and Possible Seizure D/O, with recent history of CVA who presents with numbness and weakness on the left side.     Patient was recently admitted at this hospital from 4/18/2021-4/26/2021 with right extremity weakness.  At that time, Head CT and CTA Head/Neck were negative for hemorrhage or LVO.  Brain MRI 4/18/2021 confirmed small acute infarcts scattered throughout the left greater than the right cerebral hemisphere, multiple old infarcts.  His INR was noted to be subtherapeutic, and he was started on subcutaneous lovenox bridging and continued coumadin adjusted goal of 2.5-3.5.    Echocardiogram showed LVEF 55-60%, RV normal size and function, mechanical aortic valve was poorly visualized.  Bubble study negative.  Similar findings compared to previous in January 2021.  Patient was started on aspirin.  He is on a statin.  He was seen by neurology and made a good recovery.    Patient has reportedly been doing well since that time, but developed sudden onset of numbness and tingling to his upper and lower lips this morning around 0 930 as well as tingling of the left thumb, index, and middle finger.  He also reported weakness in his left leg that started at the same time.  His symptoms are  similar to his previous CVA but on the opposite side now.  EMS notes that the patient had an unsteady gait while walking to the ambulance.  Blood glucose 159 by EMS.  Here in the ER code stroke was initiated.  Neurology evaluated the patient.  Patient is not a candidate for thrombolytics given INR greater than 1.7.  CT/CTA negative for LVO.  Vital signs are stable.  /82, heart rate 70 bpm, temperature 98, satting in the 90s on room air.  INR therapeutic at 2.87.  Troponin negative.  Routine CBC and BMP unremarkable.  COVID-19 screening negative.  Recommended for observation admission with follow-up MRI of the brain to rule out stroke given multiple risk factors.      Past Medical History    I have reviewed this patient's medical history and updated it with pertinent information if needed.   Past Medical History:   Diagnosis Date     Alcohol dependence (H)      Allergy, unspecified not elsewhere classified     seasonal     Antiplatelet or antithrombotic long-term use     coumadin/lovenox     Aortic valve prosthesis present      Atrial fibrillation (H)      Blood transfusion     after heart surg 1992     BPH (benign prostatic hypertrophy)      Chronic infection     low back wound incision not healing      Chronic pain     lower back and right leg and left leg     Coagulation disorder (H)     on blood thinners     Dissection of aorta, thoracic (H) 1992    St Jose F aotic valve + arch graft 1992     Headache(784.0)      Heart murmur     aortic valve replaced and arch     History of spinal cord injury      Low back pain      Major depression      Mixed hyperlipidemia      Numbness and tingling     right leg post surg rightt hip/ also left leg since surg     OA (osteoarthritis)     hips     Obesity, unspecified      Prostate infection      Sciatica 2002    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  Past Surgical History:   Procedure Laterality Date     AORTIC VALVE REPLACEMENT  1992    St. Jose F's valve     APPLY WOUND VAC Left 1/26/2021    Procedure: Placement of negative pressure wound therapy 2,400 squared centimeters  ;  Surgeon: Lazaro Plascencia MD;  Location: [facility] OR     C TOTAL HIP ARTHROPLASTY  2010    Left AMANDA     C TOTAL HIP ARTHROPLASTY  2002    R hip replacement comp's by nerve injury with pain down into R leg, nadya below knee     CATARACT IOL, RT/LT      rt eye only     CHOLECYSTECTOMY, LAPOROSCOPIC  8/10     CLOSE SECONDARY WOUND LOWER EXTREMITY Left 2/3/2021    Procedure:  Split Thickness Skin Graft to Leg of 18 square centimeters  Intermediate Closure of Leg of 8cm   ;  Surgeon: Lazaro Plascencia MD;  Location: [facility] OR     COLONOSCOPY N/A 1/15/2015    Procedure: COLONOSCOPY;  Surgeon: Johnson Kothari MD;  Location: [facility] GI     DECOMPRESSION LUMBAR ONE LEVEL  4/3/2013    Procedure: DECOMPRESSION LUMBAR ONE LEVEL;  Open Decompression L3-4 bilateral;  Surgeon: Travis Coffey MD;  Location: [facility] OR     DECOMPRESSION, FUSION CERVICAL ANTERIOR ONE LEVEL, COMBINED  3/23/2012    Procedure:COMBINED DECOMPRESSION, FUSION CERVICAL ANTERIOR ONE LEVEL; Anterior Cervical Decompression and Fusion C4-6; Surgeon:TRAVIS COFFEY; Location:[facility] OR     EXPLORE SPINE, REMOVE HARDWARE, COMBINED  5/23/2013    Procedure: COMBINED EXPLORE SPINE, REMOVE HARDWARE;  Exploration Lumbar Wound for fluid collection;  Surgeon: Travis Coffey MD;  Location: [facility] OR     FUSION CERVICAL ANTERIOR TWO LEVELS  3/26/2012    Procedure:FUSION CERVICAL ANTERIOR TWO LEVELS; Anterior Cervical Fusion C4-6, Anterior Cervical  Hematoma Evacuation; Surgeon:TRAVIS COFFEY; Location:[facility] OR     IRRIGATION AND DEBRIDEMENT LOWER EXTREMITY, COMBINED Left 1/10/2021    Procedure: 1.  Irrigation and excisional debridement to muscle left distal medial calf chronic wounds total area measuring 9  cm x 4 cm 2.  Irrigation and excisional debridement to fascia left medial calf chronic hematoma measuring 29 x 6.7 x 4.2 cm 3.  Primary complex wound closure left medial distal calf 9 cm in length;  Surgeon: Rod Kemp MD;  Location: [facility] OR     IRRIGATION AND DEBRIDEMENT LOWER EXTREMITY, COMBINED Left 1/26/2021    Procedure: Evacuation of hematoma debridement of skin, subcutaneous tissue and muscle 2,400 squared centimeters, placement of negative pressure wound therapy 2,400 squared centimeters;  Surgeon: Lazaro Plascencia MD;  Location: [facility] OR     IRRIGATION AND DEBRIDEMENT SPINE, CLOSE WOUND, COMBINED  3/26/2012    Procedure:COMBINED IRRIGATION AND DEBRIDEMENT SPINE, CLOSE WOUND; Surgeon:TRAVIS COFFEY; Location:[facility] OR     removal of cyst of back   2.5week     TONSILLECTOMY       wisdom teeth[       ZZC NONSPECIFIC PROCEDURE  1992    repair of TAA with graft       Prior to Admission Medications   Prior to Admission Medications   Prescriptions Last Dose Informant Patient Reported? Taking?   acetaminophen (TYLENOL) 500 MG tablet  Other No No   Sig: Take 1-2 tablets (500-1,000 mg) by mouth every 8 hours as needed for mild pain   aspirin (ASA) 81 MG EC tablet   No No   Sig: Take 1 tablet (81 mg) by mouth daily   atorvastatin (LIPITOR) 40 MG tablet   No No   Sig: Take 1 tablet (40 mg) by mouth every evening   azelastine (ASTELIN) 0.1 % nasal spray  Other No No   Sig: USE 2 SPRAYS IN NOSTRIL 2 TIMES DAILY AS NEEDED.   cetirizine (ZYRTEC) 10 MG tablet  Other No No   Sig: TAKE ONE TABLET BY MOUTH ONCE DAILY AS NEEDED   cyclobenzaprine (FLEXERIL) 10 MG tablet  Other No No   Sig: TAKE ONE TABLET BY MOUTH THREE TIMES DAILY AS NEEDED FOR MUSCLE SPASM.   diazepam (VALIUM) 5 MG tablet   No No   Sig: TAKE ONE TABLET BY MOUTH EVERY EIGHT HOURS AS NEEDED FOR ANXIETY OR MUSCLE SPASM   donepezil (ARICEPT) 10 MG tablet  Other No No   Sig: Take 2 tablets (20 mg) by mouth At Bedtime Stop if diarrhea or nausea develop   folic  acid (FOLVITE) 1 MG tablet  Other No No   Sig: Take 5 tablets (5 mg) by mouth daily   furosemide (LASIX) 20 MG tablet  Other No No   Sig: Take 1-2 tablets (20-40 mg) by mouth daily as needed (leg swelling)   guanFACINE (TENEX) 1 MG tablet  Other No No   Sig: Take 1 tablet (1 mg) by mouth At Bedtime   lamoTRIgine (LAMICTAL) 25 MG tablet  Other No No   Sig: Take 1 tablet (25 mg) by mouth daily   levothyroxine (SYNTHROID/LEVOTHROID) 150 MCG tablet  Other No No   Sig: Take 1 tablet (150 mcg) by mouth daily   liothyronine (CYTOMEL) 25 MCG tablet  Other No No   Sig: Take 1 tablet (25 mcg) by mouth daily   memantine (NAMENDA) 10 MG tablet   No No   Sig: Take 1 tablet (10 mg) by mouth 2 times daily   mirabegron (MYRBETRIQ) 50 MG 24 hr tablet  Other No No   Sig: Take 1 tablet (50 mg) by mouth daily   potassium chloride ER (K-TAB) 20 MEQ CR tablet  Other No No   Sig: Take 1 tablet (20 mEq) by mouth daily as needed (Take one tablet for each furosemide tablet that is taken)   pregabalin (LYRICA) 100 MG capsule  Other No No   Sig: Take 1 capsule (100 mg) by mouth 3 times daily Do not take if sleepy/sedated.   propafenone (RYTHMOL) 150 MG TABS tablet  Other No No   Sig: Take 1 tablet (150 mg) by mouth every 8 hours   senna-docusate (SENOKOT-S;PERICOLACE) 8.6-50 MG per tablet  Other No No   Sig: Take 1 tablet by mouth 2 times daily as needed for constipation   venlafaxine (EFFEXOR-XR) 150 MG 24 hr capsule  Other No No   Sig: Take 2 capsules (300 mg) by mouth daily   warfarin ANTICOAGULANT (COUMADIN) 2.5 MG tablet   No No   Sig: Take 3 tablets (7.5 mg) by mouth daily      Facility-Administered Medications: None     Allergies   Allergies   Allergen Reactions     Gabapentin      Severe behavioral disturbances       Social History   I have reviewed this patient's social history and updated it with pertinent information if needed. Todd S Aschoff  reports that he has never smoked. He has never used smokeless tobacco. He reports that he  does not drink alcohol or use drugs.    Family History   I have reviewed this patient's family history and updated it with pertinent information if needed.   Family History   Problem Relation Age of Onset     Cerebrovascular Disease Father          age 86, had M.I. ,diabetic also     Prostate Cancer Father      Cancer Mother          age 83 of dementia, also h/o lymphoma     Hypertension Brother         2 brothers with hypertension & sleep apnea     Hypertension Sister         Born ~1936     Sleep Apnea Brother          age 78, Alzheimers     No Known Problems Son      No Known Problems Daughter      No Known Problems Son      Family History Negative Brother         Had 5 brothers, three still alive as of 2019     Colon Cancer No family hx of      Review of Systems   Patient denies any flulike symptoms, fever, chills, headache, lightheadedness, chest pain, shortness of breath, abdominal pain, nausea, vomiting, diarrhea, rash.  The 10 point Review of Systems is negative other than noted in the HPI.    Physical Exam   Temp: 98  F (36.7  C) Temp src: Temporal BP: 125/80 Pulse: 75   Resp: 9 SpO2: 95 % O2 Device: None (Room air)    Vital Signs with Ranges  Temp:  [98  F (36.7  C)] 98  F (36.7  C)  Pulse:  [75-80] 75  Resp:  [9-20] 9  BP: (109-125)/(76-88) 125/80  SpO2:  [93 %-97 %] 95 %  306 lbs 7.03 oz    Constitutional: Awake, somnolent but awakens easily, cooperative, lying on the gurney in no apparent distress.    ENT: Normocephalic, without obvious abnormality, atraumatic, oral pharynx with moist mucus membranes.  Eyes pupils are equal, round.  Extra occular movements intact.  Normal sclera.    Neck: Supple, symmetrical, trachea midline, no adenopathy.  Pulmonary: No increased work of breathing, good air exchange, clear to auscultation bilaterally, no crackles or wheezing.  Cardiovascular: Regular rate and rhythm, normal S1 and S2, and no murmur noted.  GI: Normal bowel sounds, soft, mildly distended,  non-tender.    Skin/Integumen: Clear.  No rashes on exposed skin.  Neuro: CN II-XII grossly intact.  Speech slightly slurred.  No facial droop.  Upper and lower extremities strength, coordination and sensation intact bilaterally.    Psych:  Alert and oriented to self, place, situation. Normal affect.  Extremities: No lower extremity edema noted, and calves are non-tender to palpation bilaterally. Dorsal pedal pulses and posterior tibial pulses palpable.      Data   Data reviewed today:  I personally reviewed the EKG, labs, and imaging reports.  Recent Labs   Lab 05/17/21 1048 05/17/21 1026 05/14/21   WBC  --  6.3  --    HGB  --  13.8  --    MCV  --  90  --    PLT  --  235  --    INR 2.87*  --  3.3*   NA  --  142  --    POTASSIUM  --  3.6  --    CHLORIDE  --  107  --    CO2  --  34*  --    BUN  --  16  --    CR  --  1.14  --    ANIONGAP  --  1*  --    CATERINA  --  8.7  --    GLC  --  118*  --    TROPI  --  <0.015  --        Recent Labs   Lab 05/17/21  1048 05/17/21  1026 05/14/21   WBC  --  6.3  --    HGB  --  13.8  --    MCV  --  90  --    PLT  --  235  --    INR 2.87*  --  3.3*   NA  --  142  --    POTASSIUM  --  3.6  --    CHLORIDE  --  107  --    CO2  --  34*  --    BUN  --  16  --    CR  --  1.14  --    ANIONGAP  --  1*  --    CATERINA  --  8.7  --    GLC  --  118*  --    TROPI  --  <0.015  --      Recent Results (from the past 24 hour(s))   CT Head w/o Contrast    Narrative    CT SCAN OF THE HEAD WITHOUT CONTRAST   5/17/2021 10:41 AM     HISTORY: Neuro deficit, acute, stroke suspected. Left-sided numbness.  Recent stroke.    TECHNIQUE:  Axial images of the head and coronal reformations without  IV contrast material.  Radiation dose for this scan was reduced using  automated exposure control, adjustment of the mA and/or kV according  to patient size, or iterative reconstruction technique.    COMPARISON: 4/18/2021    FINDINGS: There is an old right cerebellar infarct and old right  lateral occipital infarct. Mild  cerebral atrophy is present. The brain  parenchyma is otherwise normal. There is no evidence for intracranial  hemorrhage, mass effect, acute infarct, or skull fracture. Visualized  paranasal sinuses and mastoid air cells are clear.      Impression    IMPRESSION: Chronic changes. No evidence for intracranial hemorrhage  or any acute process.   CTA Head Neck with Contrast    Narrative    CTA  HEAD/NECK WITH CONTRAST May 17, 2021 10:41 AM     HISTORY: Neuro deficit, acute, stroke suspected. Sudden onset of  numbness in lips, tingling, and left leg weakness.    TECHNIQUE: Multiplanar multisequence images were obtained through the  head and neck with intravenous contrast.    COMPARISON: 4/18/2021.    FINDINGS:    Brachiocephalic vessels: There is some mild atherosclerotic disease  involving the thoracic arch and some proximal brachiocephalic vessels  without stenosis. There is direct origin of the left vertebral artery  off the arch which is an anatomic variant.    Right carotid system: Mild calcific plaque is present in the proximal  internal carotid artery without stenosis. There is no evidence for  dissection.    Left carotid system: There is some mild calcific plaque in the carotid  bifurcation. There is no evidence for stenosis or dissection.    Right vertebral artery: There is some mild plaque at the origin and  distally but no stenosis or dissection is present.    Left vertebral artery: There is some mild calcific plaque at the  origin but there is no stenosis or dissection.    [place]: The basilar artery is patent. The distal internal  carotid arteries are also patent. The proximal anterior, middle, and  posterior cerebral arteries are normal in appearance. There is no  evidence for aneurysm or thromboembolism.      Impression    IMPRESSION:  1. Mild atherosclerotic disease involving both carotid bifurcations in  the proximal brachiocephalic vessels without stenosis.  2. No evidence for significant  stenosis, dissection, thromboembolism,  or aneurysm.

## 2021-05-17 NOTE — PROGRESS NOTES
RECEIVING UNIT ED HANDOFF REVIEW    ED Nurse Handoff Report was reviewed by: Emma Peterson RN on May 17, 2021 at 12:30 PM

## 2021-05-17 NOTE — ED NOTES
[facility]  ED Nurse Handoff Report    ED Chief complaint: No chief complaint on file.      ED Diagnosis:   Final diagnoses:   Cerebrovascular accident (CVA), unspecified mechanism (H)       Code Status: Full Code    Allergies:   Allergies   Allergen Reactions     Gabapentin      Severe behavioral disturbances       Patient Story: c/o numbness on top and bottom of both slides of the mouth; tingling in left fingers and left leg  Focused Assessment:  hx of previous strokes; on coumidian  For artificiale valve; c/o stoke sx; de-esculated code stroke;      Treatments and/or interventions provided: observation   Patient's response to treatments and/or interventions: well     To be done/followed up on inpatient unit:      Does this patient have any cognitive concerns?: alert    Activity level - Baseline/Home:  Independent  Activity Level - Current:   Unknown    Patient's Preferred language: English   Needed?: No    Isolation: None and Contact   Infection: Not Applicable  MRSA  Patient tested for COVID 19 prior to admission: YES  Bariatric?: No    Vital Signs:   Vitals:    05/17/21 1029   BP: 125/88   Pulse: 80   Resp: 20   Temp: 98  F (36.7  C)   TempSrc: Temporal   SpO2: 97%   Weight: 139 kg (306 lb 7 oz)       Cardiac Rhythm:Cardiac Rhythm: Normal sinus rhythm    Was the PSS-3 completed:   Yes  What interventions are required if any?               Family Comments:   OBS brochure/video discussed/provided to patient/family: N/A              Name of person given brochure if not patient:               Relationship to patient:     For the majority of the shift this patient's behavior was Green.   Behavioral interventions performed were .    ED NURSE PHONE NUMBER: [phone]

## 2021-05-17 NOTE — PROGRESS NOTES
[facility]  Patient is currently open to home care services with [facility]. The patient is currently receiving RN services.  Patient's  and home health team have been notified that patient is under OBSERVATION STATUS. [facility] Liaison will continue to follow patient during stay. If patient is admitted to inpatient status please provide orders to resume home care at time of discharge if appropriate.    Cara Uribe RN   [facility] Care Liaison   [phone]

## 2021-05-17 NOTE — CONSULTS
Care Management Initial Consult    General Information  65 year old male admitted to the Observation Unit with numbness and weakness. The patient developed a sudden onset of numbness to his upper and lower lips as well as tingling in the left thumb, index, and middle finger at 0930 this morning. He also had weakness in his left leg that started at the same time. He recently had a stroke a month ago and had similar symptoms but on the right side.      Assessment completed with: Chart Review, Patient         Primary Care Provider verified and updated as needed:  Dr Obdulio Ingram   Readmission within the last 30 days:   No        Advance Care Planning:  No ACP Documents on File.          Communication Assessment  Patient's communication style: spoken language (English or Bilingual)    Hearing Difficulty or Deaf: no   Wear Glasses or Blind: no    Cognitive  Cognitive/Neuro/Behavioral:    Level of Consciousness: alert     Orientation: oriented x 4  Mood/Behavior: behavior appropriate to situation, cooperative, calm  Speech: clear, spontaneous, logical    Living Environment:   People in home:  Patient, spouse (Lissy), daughter, son in-law, 4 grandchildren  Current living Arrangements:  Home      Able to return to prior arrangements: if does not have significant discharge needs.        Family/Social Support:  Care provided by:  Self, wife assists as needed  Provides care for:  noone                Description of Support System:  Wife-Lissy, daughter and son in-law, 4 younger grandchildren-all very loving, caring, supportive       Current Resources:   Patient receiving home care services:  Yes, [facility]-RN (2x a week for INR's and Wound Care)     Community Resources:  No  Equipment currently used at home:  None  Supplies currently used at home:  None    Employment/Financial:  Employment Status: Disabled, worked as a           Financial Concerns:  No, has Medical Insurance-BCBS/[employer]BS  of [place]          Lifestyle & Psychosocial Needs:  Lifestyle     Physical activity     Days per week: Not on file     Minutes per session: Not on file     Stress: Only a little     Social Needs     Financial resource strain: Not hard at all     Food insecurity     Worry: Never true     Inability: Never true     Transportation needs     Medical: No     Non-medical: No     Socioeconomic History     Marital status:      Spouse name: Not on file     Number of children: 3     Years of education: Not on file     Highest education level: High school graduate   Occupational History     Employer: DISABLED     Comment: Previous , disabled since ~2013   Relationships     Social connections     Talks on phone: Once a week     Gets together: Once a week     Attends [employer] service: More than 4 times per year     Active member of club or organization: No     Attends meetings of clubs or organizations: Never     Relationship status:      Intimate partner violence     Fear of current or ex partner: No     Emotionally abused: No     Physically abused: No     Forced sexual activity: No     Tobacco Use     Smoking status: Never Smoker     Smokeless tobacco: Never Used   Substance and Sexual Activity     Alcohol use: No     Comment: Stopped drinking alcohol ~2009     Drug use: No     Sexual activity: Not Currently       Functional Status:  Prior to admission patient needed assistance:  No             Mental Health Status:   History of anxiety and major depressive disorder       Chemical Dependency Status:    History of Alcohol dependence-no current concerns            Values/Beliefs:  Spiritual, Cultural Beliefs, Jewish Practices, Values that affect care: Pentec[employer]al                Additional Information:     Patient is open to Home Care RN with [facility]. At this time patient prefers to return home at discharge with resumption of Home Care RN.  Patient did say that he has been at a TCU  in the past, but he did not remember the name. Case Management will continue to follow for discharge needs.        Adalgisa Ortega RN  Care Coordinator

## 2021-05-17 NOTE — PHARMACY-ANTICOAGULATION SERVICE
Clinical Pharmacy - Warfarin Dosing Consult     Pharmacy has been consulted to manage this patient s warfarin therapy.  Indication: Mechanical Aortic Valve Replacement  Therapy Goal: INR 2.5-3.5  Warfarin Prior to Admission: Yes  Warfarin PTA Regimen: 5 mg Sun-Wed-Fri  7.5 mg ROW    INR   Date Value Ref Range Status   05/17/2021 2.87 (H) 0.86 - 1.14 Final   05/14/2021 3.3 (A) 0.90 - 1.10 Final       Recommend warfarin 7.5 mg today.  Pharmacy will monitor Todd S Aschoff daily and order warfarin doses to achieve specified goal.      Please contact pharmacy as soon as possible if the warfarin needs to be held for a procedure or if the warfarin goals change.

## 2021-05-17 NOTE — PROGRESS NOTES
VSS except slightly hypertensive. A/O but slow to respond. Tele SR. Passed swallow eval with Speech. Neuros every 4 hours. MRI checklist completed and faxed to MRI. Neuro following.

## 2021-05-17 NOTE — ED NOTES
Bed: ST01  Expected date:   Expected time:   Means of arrival:   Comments:  [facility] - 55 M - stroke alert eta 1028

## 2021-05-17 NOTE — ED PROVIDER NOTES
History   Chief Complaint:  Numbness and weakness      HPI   Todd S Aschoff is a 65 year old male with history of stroke who presents with numbness and weakness. The patient developed a sudden onset of numbness to his upper and lower lips as well as tingling in the left thumb, index, and middle finger at 0930 this morning. He also has weakness in his left leg that started at the same time. He recently had a stroke a month ago and had similar symptoms but on the right side. EMS notes that the patient had an unsteady gait while walking to the ambulance. Blood sugar was 159 by EMS.     Review of Systems   Neurological: Positive for weakness (left leg) and numbness (upper and lower lips).   All other systems reviewed and are negative.         Allergies:  Gabapentin    Medications:  aspirin   atorvastatin   diazepam   donepezil   furosemide  Tenex   lamictal   levothyroxine  liothyronine   memantine  mirabegron  potassium chloride ER   pregabalin  propafenone  venlafaxine   warfarin     Past Medical History:    Alcohol dependence    Aortic valve prosthesis present   Atrial fibrillation    Blood transfusion   BPH (benign prostatic hypertrophy)   Chronic infection   Chronic pain   Coagulation disorder    Dissection of aorta, thoracic     Heart murmur   History of spinal cord injury   Major depression   Mixed hyperlipidemia   OA (osteoarthritis)   Obesity, unspecified   Prostate infection   Sciatica   Unspecified hypothyroidism  Stroke      Past Surgical History:    Aortic valve replacement   Apply wound vac  Left hip arthroplasty   Cholecystectomy   Close secondary wound lower extremity   Decompression lumbar  repair of TAA with graft  Tonsillectomy   Brothers teeth   Fusion cervical anterior   Explore spine remove hardware     Family History:    Cerebrovascular disease  Cancer   Prostate cancer   Hypertension   Sleep apnea     Social History:  Patient presents by EMS.   PCP: Obdulio Ingram     Physical Exam     Patient  Vitals for the past 24 hrs:   BP Temp Temp src Pulse Resp SpO2 Weight   05/17/21 1029 125/88 98  F (36.7  C) Temporal 80 20 97 % 139 kg (306 lb 7 oz)       Physical Exam  Vitals: reviewed by me  General: [facility] [place] [facility] [place], cooperative, and alert to conversation  Eyes: Tracking well, clear conjunctiva BL  ENT: MMM, midline trachea.   Lungs: No tachypnea, no accessory muscle use. No respiratory distress.   CV: Rate as above  Abd: Soft, non tender, no guarding, no rebound. Non distended  MSK: no joint effusion.  No evidence of trauma  Skin: No rash  Neuro: Clear speech and no facial droop.  Bilateral upper and bilateral lower extremities with sensation intact light touch and 5 and 5 motor throughout.  Tells me he has slightly decreased sensation however to digits 1 2 and 3 on his left hand, as well as to his upper and lower lip bilaterally, as well as to his gums and teeth bilaterally.  No slurred speech, following commands, no neglect.  Cranial nerves II through XII are intact.  Psych: Not RIS, no e/o AH/VH      Emergency Department Course   ECG:  ECG taken at 1116  Sinus rhythm with premature atrial complexes   Left axis deviation   Abnormal ECG  Rate 78 bpm. MN interval 180 ms. QRS duration 100 ms. QT/QTc 394/449 ms. P-R-T axes 39 -35 75.     Imaging:  CTA Head Neck with Contrast  Preliminary Result  IMPRESSION:  1. Mild atherosclerotic disease involving both carotid bifurcations in  the proximal brachiocephalic vessels without stenosis.  2. No evidence for significant stenosis, dissection, thromboembolism,  or aneurysm.  Reading per radiology     CT Head w/o Contrast  Preliminary Result  IMPRESSION: Chronic changes. No evidence for intracranial hemorrhage  or any acute process.  Reading per radiology       Laboratory:  INR: 2.87 (H)    PTT: 44 (H)    CBC: WBC 6.3, HGB 13.8,        BMP: Glucose 118 (H), CO2 34 (H), anion gap 1 (L), o/w WNL (Creatinine: 1.14)     Troponin(1026):  <0.015       Asymptomatic Influenza A/B & SARS-CoV2 (COVID-19) Virus PCR Multiplex: pending     Emergency Department Course:    Reviewed:  I reviewed the patient's nursing notes, vitals, past medical records, Care Everywhere.     Assessments:  1025  I performed an exam of the patient as documented above.     Consults:   1030 I spoke with Nancy VINSON of the stroke neurology service regarding patient's presentation, findings, and plan of care.  1041 I spoke with Dr. Bauman of the stroke neurology service regarding patient's presentation, findings, and plan of care. Code stroke was deescalated.  1053 I spoke with Dr. Whittington of the hospitalist service from [facility] regarding patient's presentation, findings, and plan of care.      Disposition:  Admitted.      Impression & Plan   Covid-19  Todd S Aschoff was evaluated during a global COVID-19 pandemic, which necessitated consideration that the patient might be at risk for infection with the SARS-CoV-2 virus that causes COVID-19.   Applicable protocols for evaluation were followed during the patient's care.   COVID-19 was considered as part of the patient's evaluation. The plan for testing is:  a test was obtained during this visit.      Medical Decision Making:  This is a very pleasant 65-year-old male who presents the emergency room with what appears to be a possible stroke syndrome.  His symptoms began acutely, and he has significant risk factors, but being on Coumadin does preclude him from getting TNKase, and also his CT scans did not show, nor is his exam consistent with a large vessel occlusion.  I did call a code stroke, but the code stroke team is the escalated.  He does need to be admitted for an MRI, and continued monitoring.  I spoken to Dr. Whittington of the hospitalist team was kindly agreed accept care of the patient.    Of note the patient is resting comfortably now, acting amicably with staff, does not appear to be in distress, and understands he will be admitted for  an MRI as it is possible that he did have another small stroke.  He tells me he is comfortable with this plan, has no chest pain, and his labs have otherwise come back within normal limits.  We will plan for admission as above.      Diagnosis:    ICD-10-CM    1. Cerebrovascular accident (CVA), unspecified mechanism (H)  I63.9 Basic metabolic panel     Troponin I     INR     Partial thromboplastin time     Scribe Disclosure:  Emanuel DUARTE, am serving as a scribe at 10:34 AM on 5/17/2021 to document services personally performed by Kem Zuniga MD based on my observations and the provider's statements to me.        Kem Zuniga MD  05/17/21 9575

## 2021-05-17 NOTE — PHARMACY-ANTICOAGULATION SERVICE
Clinical Pharmacy - Warfarin Dosing Consult     Pharmacy has been consulted to manage this patient s warfarin therapy.  Indication: Atrial Fibrillation;Mechanical Aortic Valve Replacement  Therapy Goal: INR 2.5-3.5  Warfarin Prior to Admission: Yes  Warfarin PTA Regimen: 5 mg Sat-Sun-Wed-Fri  7.5 mg ROW  Dose Comments: AC clinic visit notes 5/14 says dose is 5 mg Sun-Wed-Fri 7.5 mg ROW    INR   Date Value Ref Range Status   05/17/2021 2.87 (H) 0.86 - 1.14 Final   05/14/2021 3.3 (A) 0.90 - 1.10 Final       Recommend warfarin 7.5 mg today.  Pharmacy will monitor Todd S Aschoff daily and order warfarin doses to achieve specified goal.      Please contact pharmacy as soon as possible if the warfarin needs to be held for a procedure or if the warfarin goals change.

## 2021-05-17 NOTE — PHARMACY-ADMISSION MEDICATION HISTORY
Pharmacy Medication History  Admission medication history interview status for the 5/17/2021  admission is complete. See EPIC admission navigator for prior to admission medications     Location of Interview: Phone  Medication history sources: Patient's family/friend (spoke with patient's son in law - Garland [phone])      In the past week, patient estimated taking medication this percent of the time: greater than 90% except was off Namenda for about a week.     Additional medication history information:   Warfarin:   Patient is taking warfarin for the indication of aortic valve with an INR goal of 2.5-3.5.   Current dosing regimen is 7.5mg Mon, Tues, Thurs, 5mg ROW.   Last dose (5 mg) was taken 5/16 at PM.        Medication reconciliation completed by provider prior to medication history? No    Time spent in this activity: 25 minutes    Prior to Admission medications    Medication Sig Last Dose Taking? Auth Provider   acetaminophen (TYLENOL) 500 MG tablet Take 1-2 tablets (500-1,000 mg) by mouth every 8 hours as needed for mild pain prn med Yes Aurelio Etienne,    aspirin (ASA) 81 MG EC tablet Take 1 tablet (81 mg) by mouth daily 5/17/2021 at Unknown time Yes Jc Friedman MD   atorvastatin (LIPITOR) 40 MG tablet Take 1 tablet (40 mg) by mouth every evening 5/16/2021 at pm Yes Jc Friedman MD   azelastine (ASTELIN) 0.1 % nasal spray USE 2 SPRAYS IN NOSTRIL 2 TIMES DAILY AS NEEDED. prn med Yes Obdulio Ingram MD   cetirizine (ZYRTEC) 10 MG tablet Take 10 mg by mouth every morning 5/17/2021 at am Yes Unknown, Entered By History   cyclobenzaprine (FLEXERIL) 10 MG tablet TAKE ONE TABLET BY MOUTH THREE TIMES DAILY AS NEEDED FOR MUSCLE SPASM.  Patient taking differently: Take 10 mg by mouth 3 times daily  5/17/2021 at am x 1 dose Yes Obdulio Ingram MD   diazepam (VALIUM) 5 MG tablet TAKE ONE TABLET BY MOUTH EVERY EIGHT HOURS AS NEEDED FOR ANXIETY OR MUSCLE SPASM 5/16/2021 at pm Yes  Obdulio Ingram MD   donepezil (ARICEPT) 10 MG tablet Take 2 tablets (20 mg) by mouth At Bedtime Stop if diarrhea or nausea develop 5/16/2021 at pm Yes Jody Tanner MD   folic acid (FOLVITE) 1 MG tablet Take 5 tablets (5 mg) by mouth daily 5/17/2021 at am Yes Jody Tanner MD   furosemide (LASIX) 20 MG tablet Take 1-2 tablets (20-40 mg) by mouth daily as needed (leg swelling) prn med Yes Obdulio Ingram MD   guanFACINE (TENEX) 1 MG tablet Take 1 tablet (1 mg) by mouth At Bedtime 5/16/2021 at pm Yes Savi Ken CNP   lamoTRIgine (LAMICTAL) 25 MG tablet Take 1 tablet (25 mg) by mouth daily  Patient taking differently: Take 25 mg by mouth every evening  5/16/2021 at pm Yes Jody Tanner MD   levothyroxine (SYNTHROID/LEVOTHROID) 150 MCG tablet Take 1 tablet (150 mcg) by mouth daily  Patient taking differently: Take 150 mcg by mouth every evening  5/16/2021 at pm Yes Obdulio Ingram MD   liothyronine (CYTOMEL) 25 MCG tablet Take 1 tablet (25 mcg) by mouth daily  Patient taking differently: Take 25 mcg by mouth every evening  5/16/2021 at pm Yes Jody Tanner MD   memantine (NAMENDA) 10 MG tablet Take 1 tablet (10 mg) by mouth 2 times daily 5/17/2021 at am x 1 dose Yes Obdulio Ingram MD   mirabegron (MYRBETRIQ) 50 MG 24 hr tablet Take 1 tablet (50 mg) by mouth daily  Patient taking differently: Take 50 mg by mouth every evening  5/16/2021 at pm Yes Obdulio Ingram MD   potassium chloride ER (K-TAB) 20 MEQ CR tablet Take 1 tablet (20 mEq) by mouth daily as needed (Take one tablet for each furosemide tablet that is taken) prn med Yes Obdulio Ingram MD   pregabalin (LYRICA) 100 MG capsule Take 1 capsule (100 mg) by mouth 3 times daily Do not take if sleepy/sedated. 5/17/2021 at am x 1 dose Yes Savi Ken CNP   propafenone (RYTHMOL) 150 MG TABS tablet Take 1 tablet (150 mg) by mouth every 8 hours 5/17/2021 at am x 1 dose Yes Obdulio Ingram MD   senna-docusate  (SENOKOT-S;PERICOLACE) 8.6-50 MG per tablet Take 1 tablet by mouth 2 times daily as needed for constipation prn med Yes Obdulio Ingram MD   venlafaxine (EFFEXOR-XR) 150 MG 24 hr capsule Take 2 capsules (300 mg) by mouth daily  Patient taking differently: Take 300 mg by mouth daily Takes in the afternoon. 5/16/2021 at afternoon Yes Obdulio Ingram MD   warfarin ANTICOAGULANT (COUMADIN) 2.5 MG tablet Take 7.5 mg by mouth three times a week Mon, Tues, Thurs 5/13/2021 at pm Yes Unknown, Entered By History   warfarin ANTICOAGULANT (COUMADIN) 2.5 MG tablet Take 5 mg by mouth four times a week Wed, Fri, Sat & Sun 5/16/2021 at pm Yes Unknown, Entered By History       The information provided in this note is only as accurate as the sources available at the time of update(s)

## 2021-05-17 NOTE — PLAN OF CARE
PRIMARY DIAGNOSIS: TIA  OUTPATIENT/OBSERVATION GOALS TO BE MET BEFORE DISCHARGE:  1. Orthostatic performed: N/A    2. Diagnostic testing complete & at baseline neurologic testing: No, brain MRI ordered.    3. Cleared by consultants (if involved): No, Neuro following. PT/OT consulted.    4. Interpretation of cardiac rhythm per telemetry tech: SR    5. Tolerating adequate PO diet and medications: Yes    6. Return to near baseline physical activity or neurologic status: No, unsteady gait    Discharge Planner Nurse   Safe discharge environment identified: No  Barriers to discharge: Yes       Entered by: Emma Peterson 05/17/2021 6:39 PM     Please review provider order for any additional goals.   Nurse to notify provider when observation goals have been met and patient is ready for discharge.

## 2021-05-17 NOTE — ED NOTES
Wife Lissy updated by RN with pt's permission. Lissy updated on pt condition, plan of care, and disposition to Obs Unit bed 18.

## 2021-05-17 NOTE — CONSULTS
"[facility]    Stroke Consult Note    Reason for Consult: Stroke Code    Chief Complaint: One-sided Weakness      HPI  Todd S Aschoff is a 65 year old male with a complicated past medical history including history of aortic dissection status post mechanical valve plus graft, atrial fibrillation, anticoagulated with warfarin, recent stroke last month who presents to the hospital with left hand and mouth numbness that started at 0930. CT/CTA was negative for LVO and patient unfortunately not a thrombolytic candidate due to being on AC and recent stroke. He will need an MRI to r/o stroke given multiple risk factors.     Thrombolytic Treatment   Not given due to minor/isolated/quickly resolving symptoms and DOAC dose within 48 hours or INR > 1.7.    Endovascular Treatment  Not initiated due to absence of proximal vessel occlusion    Impression  Ischemic Stroke due to undetermined etiology   ECHO 4/19, EF 55%, no shunt  a1c 4.9%      Recommendations  - Use orderset: \"Ischemic Stroke Routine Admission\" or \"Ischemic Stroke No Thrombolytics/No Thrombectomy ICU Admission\"  - Neurochecks and Vital Signs every 4   - Permissive HTN; goal SBP < 220 mmHg  - Statin: Lipitor 40 mg, LDL goal 40-70  - MRI Brain without contrast  - No need to repeat ECHO   - Telemetry, EKG  - Bedside Glucose Monitoring  - A1c, Lipid Panel, Troponin x 3  - PT/OT/SLP  - Stroke Education  - Euthermia, Euglycemia  - Resume PTA coumadin    Patient Follow-up    - final recommendation pending work-up    Thank you for this consult. We will continue to follow.     The Stroke Staff is Dr. Garcia.    Dell Bauman MD  Vascular Neurology Fellow  To page me or covering stroke neurology team member, click here: AMCOM   Choose \"On Call\" tab at top, then search dropdown box for \"Neurology Adult\", select location, press Enter, then look for stroke/neuro ICU/telestroke.    ______________________________________________________    Past " Medical History   Past Medical History:   Diagnosis Date     Alcohol dependence (H)      Allergy, unspecified not elsewhere classified     seasonal     Antiplatelet or antithrombotic long-term use     coumadin/lovenox     Aortic valve prosthesis present      Atrial fibrillation (H)      Blood transfusion     after heart surg 1992     BPH (benign prostatic hypertrophy)      Chronic infection     low back wound incision not healing      Chronic pain     lower back and right leg and left leg     Coagulation disorder (H)     on blood thinners     Dissection of aorta, thoracic (H) 1992    St Jose F aotic valve + arch graft 1992     Headache(784.0)      Heart murmur     aortic valve replaced and arch     History of spinal cord injury      Low back pain      Major depression      Mixed hyperlipidemia      Numbness and tingling     right leg post surg rightt hip/ also left leg since surg     OA (osteoarthritis)     hips     Obesity, unspecified      Prostate infection      Sciatica 2002    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 Jose F's valve     APPLY WOUND VAC Left 1/26/2021    Procedure: Placement of negative pressure wound therapy 2,400 squared centimeters  ;  Surgeon: Lazaro Plascencia MD;  Location:  OR     C TOTAL HIP ARTHROPLASTY  2010    Left AMANDA     C TOTAL HIP ARTHROPLASTY  2002    R hip replacement comp's by nerve injury with pain down into R leg, nadya below knee     CATARACT IOL, RT/LT      rt eye only     CHOLECYSTECTOMY, LAPOROSCOPIC  8/10     CLOSE SECONDARY WOUND LOWER EXTREMITY Left 2/3/2021    Procedure:  Split Thickness Skin Graft to Leg of 18 square centimeters  Intermediate Closure of Leg of 8cm   ;  Surgeon: Lazaro Plascencia MD;  Location: [facility] OR     COLONOSCOPY N/A 1/15/2015    Procedure: COLONOSCOPY;  Surgeon: Johnson Kothari MD;  Location:  GI      DECOMPRESSION LUMBAR ONE LEVEL  4/3/2013    Procedure: DECOMPRESSION LUMBAR ONE LEVEL;  Open Decompression L3-4 bilateral;  Surgeon: Khalif Coffey MD;  Location: [facility] OR     DECOMPRESSION, FUSION CERVICAL ANTERIOR ONE LEVEL, COMBINED  3/23/2012    Procedure:COMBINED DECOMPRESSION, FUSION CERVICAL ANTERIOR ONE LEVEL; Anterior Cervical Decompression and Fusion C4-6; Surgeon:KHALIF COFFEY; Location:[facility] OR     EXPLORE SPINE, REMOVE HARDWARE, COMBINED  5/23/2013    Procedure: COMBINED EXPLORE SPINE, REMOVE HARDWARE;  Exploration Lumbar Wound for fluid collection;  Surgeon: Khalif Coffey MD;  Location: [facility] OR     FUSION CERVICAL ANTERIOR TWO LEVELS  3/26/2012    Procedure:FUSION CERVICAL ANTERIOR TWO LEVELS; Anterior Cervical Fusion C4-6, Anterior Cervical  Hematoma Evacuation; Surgeon:KHALIF COFFEY; Location:[facility] OR     IRRIGATION AND DEBRIDEMENT LOWER EXTREMITY, COMBINED Left 1/10/2021    Procedure: 1.  Irrigation and excisional debridement to muscle left distal medial calf chronic wounds total area measuring 9 cm x 4 cm 2.  Irrigation and excisional debridement to fascia left medial calf chronic hematoma measuring 29 x 6.7 x 4.2 cm 3.  Primary complex wound closure left medial distal calf 9 cm in length;  Surgeon: Rod Kemp MD;  Location: [facility] OR     IRRIGATION AND DEBRIDEMENT LOWER EXTREMITY, COMBINED Left 1/26/2021    Procedure: Evacuation of hematoma debridement of skin, subcutaneous tissue and muscle 2,400 squared centimeters, placement of negative pressure wound therapy 2,400 squared centimeters;  Surgeon: Lazaro Plascencia MD;  Location: [facility] OR     IRRIGATION AND DEBRIDEMENT SPINE, CLOSE WOUND, COMBINED  3/26/2012    Procedure:COMBINED IRRIGATION AND DEBRIDEMENT SPINE, CLOSE WOUND; Surgeon:KHALIF COFFEY; Location:[facility] OR     removal of cyst of back   2.5week     TONSILLECTOMY       wisdom teeth[       ZZC NONSPECIFIC PROCEDURE  1992    repair of TAA with graft     Medications   Home  Meds  Prior to Admission medications    Medication Sig Start Date End Date Taking? Authorizing Provider   acetaminophen (TYLENOL) 500 MG tablet Take 1-2 tablets (500-1,000 mg) by mouth every 8 hours as needed for mild pain 2/7/21   Aurelio Etienne DO   aspirin (ASA) 81 MG EC tablet Take 1 tablet (81 mg) by mouth daily 4/27/21   Jc Friedman MD   atorvastatin (LIPITOR) 40 MG tablet Take 1 tablet (40 mg) by mouth every evening 4/26/21   Jc Friedman MD   azelastine (ASTELIN) 0.1 % nasal spray USE 2 SPRAYS IN NOSTRIL 2 TIMES DAILY AS NEEDED. 5/29/20   Obdulio Ingram MD   cetirizine (ZYRTEC) 10 MG tablet TAKE ONE TABLET BY MOUTH ONCE DAILY AS NEEDED 2/22/21   Obdulio Ingram MD   cyclobenzaprine (FLEXERIL) 10 MG tablet TAKE ONE TABLET BY MOUTH THREE TIMES DAILY AS NEEDED FOR MUSCLE SPASM. 3/9/21   Obdulio Ingram MD   diazepam (VALIUM) 5 MG tablet TAKE ONE TABLET BY MOUTH EVERY EIGHT HOURS AS NEEDED FOR ANXIETY OR MUSCLE SPASM 4/28/21   Obdulio Ingram MD   donepezil (ARICEPT) 10 MG tablet Take 2 tablets (20 mg) by mouth At Bedtime Stop if diarrhea or nausea develop 2/22/21   Jody Tanner MD   folic acid (FOLVITE) 1 MG tablet Take 5 tablets (5 mg) by mouth daily 2/22/21   Jody Tanner MD   furosemide (LASIX) 20 MG tablet Take 1-2 tablets (20-40 mg) by mouth daily as needed (leg swelling) 4/15/21   Obdulio Ingram MD   guanFACINE (TENEX) 1 MG tablet Take 1 tablet (1 mg) by mouth At Bedtime 4/5/21   Savi Ken CNP   lamoTRIgine (LAMICTAL) 25 MG tablet Take 1 tablet (25 mg) by mouth daily 2/22/21   Jody Tanner MD   levothyroxine (SYNTHROID/LEVOTHROID) 150 MCG tablet Take 1 tablet (150 mcg) by mouth daily 12/11/20   Obdulio Ingram MD   liothyronine (CYTOMEL) 25 MCG tablet Take 1 tablet (25 mcg) by mouth daily 2/22/21   Jody Tanner MD   memantine (NAMENDA) 10 MG tablet Take 1 tablet (10 mg) by mouth 2 times daily 5/6/21   Obdulio Ingarm MD mirabegron  (MYRBETRIQ) 50 MG 24 hr tablet Take 1 tablet (50 mg) by mouth daily 20   Obdulio Ingram MD   potassium chloride ER (K-TAB) 20 MEQ CR tablet Take 1 tablet (20 mEq) by mouth daily as needed (Take one tablet for each furosemide tablet that is taken) 4/15/21   Obdulio Ingram MD   pregabalin (LYRICA) 100 MG capsule Take 1 capsule (100 mg) by mouth 3 times daily Do not take if sleepy/sedated. 21   Savi Ken CNP   propafenone (RYTHMOL) 150 MG TABS tablet Take 1 tablet (150 mg) by mouth every 8 hours 12/10/20   Obdulio Ingram MD   senna-docusate (SENOKOT-S;PERICOLACE) 8.6-50 MG per tablet Take 1 tablet by mouth 2 times daily as needed for constipation 18   Obdulio Ingram MD   venlafaxine (EFFEXOR-XR) 150 MG 24 hr capsule Take 2 capsules (300 mg) by mouth daily 21   Obdulio Ingram MD   warfarin ANTICOAGULANT (COUMADIN) 2.5 MG tablet Take 3 tablets (7.5 mg) by mouth daily 21   Jc Friedman MD       Scheduled Meds    aspirin  81 mg Oral Daily     sodium chloride (PF)  3 mL Intracatheter Q8H     warfarin ANTICOAGULANT  7.5 mg Oral ONCE at 18:00       Infusion Meds    Warfarin Therapy Reminder         PRN Meds  acetaminophen, acetaminophen, lidocaine 4%, lidocaine (buffered or not buffered), melatonin, ondansetron **OR** ondansetron, polyethylene glycol, senna-docusate **OR** senna-docusate, sodium chloride (PF), Warfarin Therapy Reminder    Allergies   Allergies   Allergen Reactions     Gabapentin      Severe behavioral disturbances     Family History   Family History   Problem Relation Age of Onset     Cerebrovascular Disease Father          age 86, had M.I. ,diabetic also     Prostate Cancer Father      Cancer Mother          age 83 of dementia, also h/o lymphoma     Hypertension Brother         2 brothers with hypertension & sleep apnea     Hypertension Sister         Born ~1936     Sleep Apnea Brother          age 78, Alzheimers     No Known Problems Son      No  Known Problems Daughter      No Known Problems Son      Family History Negative Brother         Had 5 brothers, three still alive as of 2019     Colon Cancer No family hx of      Social History   Social History     Tobacco Use     Smoking status: Never Smoker     Smokeless tobacco: Never Used   Substance Use Topics     Alcohol use: No     Comment: Stopped drinking alcohol ~2009     Drug use: No       Review of Systems   The 10 point Review of Systems is negative other than noted in the HPI or here.        PHYSICAL EXAMINATION  Temp:  [97.6  F (36.4  C)-98  F (36.7  C)] 97.6  F (36.4  C)  Pulse:  [74-80] 76  Resp:  [9-20] 12  BP: (109-156)/(76-96) 156/96  SpO2:  [93 %-97 %] 97 %     Neurologic  Mental Status:  alert, oriented x 3, follows commands, speech clear and fluent, naming and repetition normal  Cranial Nerves:  visual fields intact, PERRL, EOMI with normal smooth pursuit, facial sensation intact and symmetric, facial movements symmetric, hearing not formally tested but intact to conversation, palate elevation symmetric and uvula midline, no dysarthria, shoulder shrug strong bilaterally, tongue protrusion midline  Motor:  normal muscle tone and bulk, no abnormal movements, able to move all limbs spontaneously, strength 5/5 throughout upper and lower extremities, no pronator drift  Reflexes:  toes down-going  Sensory:  light touch sensation intact and symmetric throughout upper and lower extremities, no extinction on double simultaneous stimulation   Coordination:  normal finger-to-nose and heel-to-shin bilaterally without dysmetria, rapid alternating movements symmetric  Station/Gait:  deferred      Dysphagia Screen  Per Nursing    Stroke Scales    NIHSS  Interval baseline (05/17/21 1036)   Interval Comments     1a. Level of Consciousness 0-->Alert, keenly responsive   1b. LOC Questions 0-->Answers both questions correctly   1c. LOC Commands 0-->Performs both tasks correctly   2.   Best Gaze 0-->Normal   3.    Visual 0-->No visual loss   4.   Facial Palsy 0-->Normal symmetrical movements   5a. Motor Arm, Left 0-->No drift, limb holds 90 (or 45) degrees for full 10 secs   5b. Motor Arm, Right 0-->No drift, limb holds 90 (or 45) degrees for full 10 secs   6a. Motor Leg, Left 0-->No drift, leg holds 30 degree position for full 5 secs   6b. Motor Leg, right 0-->No drift, leg holds 30 degree position for full 5 secs   7.   Limb Ataxia 0-->Absent   8.   Sensory 1-->Mild-to-moderate sensory loss, patient feels pinprick is less sharp or is dull on the affected side, or there is a loss of superficial pain with pinprick, but patient is aware of being touched   9.   Best Language 0-->No aphasia, normal   10. Dysarthria 0-->Normal   11. Extinction and Inattention  0-->No abnormality   Total 1 (05/17/21 1036)       Imaging  I personally reviewed all imaging; relevant findings per HPI.     Lab Results Data   CBC  Recent Labs   Lab 05/17/21  1026   WBC 6.3   RBC 4.64   HGB 13.8   HCT 41.8        Basic Metabolic Panel    Recent Labs   Lab 05/17/21  1026      POTASSIUM 3.6   CHLORIDE 107   CO2 34*   BUN 16   CR 1.14   *   CATERINA 8.7     Liver Panel  No results for input(s): PROTTOTAL, ALBUMIN, BILITOTAL, ALKPHOS, AST, ALT, BILIDIRECT in the last 168 hours.  INR    Recent Labs   Lab Test 05/17/21  1048 05/14/21 05/10/21   INR 2.87* 3.3* 2.4*      Lipid Profile    Recent Labs   Lab Test 04/18/21  1759 01/23/21  0748 12/11/20  1545 12/24/14  0846 12/24/14  0846 12/17/13  1012   CHOL 254* 135 226*   < > 166 170   HDL 43 44 39*   < > 41 30*   * 65 149*   < > 78 74   TRIG 233* 130 192*   < > 234* 331*   CHOLHDLRATIO  --   --   --   --  4.0 5.7*    < > = values in this interval not displayed.     A1C    Recent Labs   Lab Test 01/23/21  0747   A1C 4.9     Troponin I    Recent Labs   Lab 05/17/21  1026   TROPI <0.015          Stroke Code / Stroke Consult Data Data   Stroke Code Data  (for stroke code without tele)  Stroke  code activated 05/17/21   1024   First stroke provider response 05/17/21   1024   Last known normal 05/17/21   0930   Time of discovery   (or onset of symptoms) 05/17/21   0930   Head CT read by Stroke Neuro Dr/Provider 05/17/21   1036   Was stroke code de-escalated? Yes 05/17/21 1036  other (see comments) mild symptom, on coumadin, recent stroke.

## 2021-05-18 ENCOUNTER — APPOINTMENT (OUTPATIENT)
Dept: OCCUPATIONAL THERAPY | Facility: CLINIC | Age: 66
End: 2021-05-18
Attending: PHYSICIAN ASSISTANT
Payer: COMMERCIAL

## 2021-05-18 ENCOUNTER — APPOINTMENT (OUTPATIENT)
Dept: CT IMAGING | Facility: CLINIC | Age: 66
End: 2021-05-18
Attending: PSYCHIATRY & NEUROLOGY
Payer: COMMERCIAL

## 2021-05-18 LAB
ANION GAP SERPL CALCULATED.3IONS-SCNC: 2 MMOL/L (ref 3–14)
BUN SERPL-MCNC: 14 MG/DL (ref 7–30)
CALCIUM SERPL-MCNC: 8.7 MG/DL (ref 8.5–10.1)
CHLORIDE SERPL-SCNC: 106 MMOL/L (ref 94–109)
CO2 SERPL-SCNC: 32 MMOL/L (ref 20–32)
CREAT SERPL-MCNC: 1 MG/DL (ref 0.66–1.25)
ERYTHROCYTE [DISTWIDTH] IN BLOOD BY AUTOMATED COUNT: 14.8 % (ref 10–15)
GFR SERPL CREATININE-BSD FRML MDRD: 79 ML/MIN/{1.73_M2}
GLUCOSE SERPL-MCNC: 87 MG/DL (ref 70–99)
HCT VFR BLD AUTO: 41.1 % (ref 40–53)
HGB BLD-MCNC: 13.5 G/DL (ref 13.3–17.7)
INR PPP: 3.04 (ref 0.86–1.14)
MCH RBC QN AUTO: 29.6 PG (ref 26.5–33)
MCHC RBC AUTO-ENTMCNC: 32.8 G/DL (ref 31.5–36.5)
MCV RBC AUTO: 90 FL (ref 78–100)
PLATELET # BLD AUTO: 227 10E9/L (ref 150–450)
PLATELET FUNCTION ASA: 463 ARU
POTASSIUM SERPL-SCNC: 3.7 MMOL/L (ref 3.4–5.3)
RBC # BLD AUTO: 4.56 10E12/L (ref 4.4–5.9)
SODIUM SERPL-SCNC: 140 MMOL/L (ref 133–144)
WBC # BLD AUTO: 5.7 10E9/L (ref 4–11)

## 2021-05-18 PROCEDURE — G0378 HOSPITAL OBSERVATION PER HR: HCPCS

## 2021-05-18 PROCEDURE — 71260 CT THORAX DX C+: CPT

## 2021-05-18 PROCEDURE — 85576 BLOOD PLATELET AGGREGATION: CPT | Mod: TC | Performed by: STUDENT IN AN ORGANIZED HEALTH CARE EDUCATION/TRAINING PROGRAM

## 2021-05-18 PROCEDURE — 36415 COLL VENOUS BLD VENIPUNCTURE: CPT | Performed by: PHYSICIAN ASSISTANT

## 2021-05-18 PROCEDURE — 99232 SBSQ HOSP IP/OBS MODERATE 35: CPT | Performed by: PHYSICIAN ASSISTANT

## 2021-05-18 PROCEDURE — 80048 BASIC METABOLIC PNL TOTAL CA: CPT | Performed by: PHYSICIAN ASSISTANT

## 2021-05-18 PROCEDURE — 250N000013 HC RX MED GY IP 250 OP 250 PS 637: Performed by: STUDENT IN AN ORGANIZED HEALTH CARE EDUCATION/TRAINING PROGRAM

## 2021-05-18 PROCEDURE — 250N000013 HC RX MED GY IP 250 OP 250 PS 637: Performed by: PHYSICIAN ASSISTANT

## 2021-05-18 PROCEDURE — 999N000147 HC STATISTIC PT IP EVAL DEFER

## 2021-05-18 PROCEDURE — 97535 SELF CARE MNGMENT TRAINING: CPT | Mod: GO | Performed by: OCCUPATIONAL THERAPIST

## 2021-05-18 PROCEDURE — 85610 PROTHROMBIN TIME: CPT | Performed by: PHYSICIAN ASSISTANT

## 2021-05-18 PROCEDURE — 97165 OT EVAL LOW COMPLEX 30 MIN: CPT | Mod: GO | Performed by: OCCUPATIONAL THERAPIST

## 2021-05-18 PROCEDURE — 250N000011 HC RX IP 250 OP 636: Performed by: HOSPITALIST

## 2021-05-18 PROCEDURE — 250N000009 HC RX 250: Performed by: HOSPITALIST

## 2021-05-18 PROCEDURE — 120N000004 HC R&B MS OVERFLOW

## 2021-05-18 PROCEDURE — 99232 SBSQ HOSP IP/OBS MODERATE 35: CPT | Mod: GC | Performed by: PSYCHIATRY & NEUROLOGY

## 2021-05-18 PROCEDURE — 36415 COLL VENOUS BLD VENIPUNCTURE: CPT | Performed by: STUDENT IN AN ORGANIZED HEALTH CARE EDUCATION/TRAINING PROGRAM

## 2021-05-18 PROCEDURE — 85027 COMPLETE CBC AUTOMATED: CPT | Performed by: PHYSICIAN ASSISTANT

## 2021-05-18 PROCEDURE — 250N000013 HC RX MED GY IP 250 OP 250 PS 637: Performed by: HOSPITALIST

## 2021-05-18 RX ORDER — ATORVASTATIN CALCIUM 40 MG/1
80 TABLET, FILM COATED ORAL EVERY EVENING
Status: DISCONTINUED | OUTPATIENT
Start: 2021-05-18 | End: 2021-05-19 | Stop reason: HOSPADM

## 2021-05-18 RX ORDER — IOPAMIDOL 755 MG/ML
135 INJECTION, SOLUTION INTRAVASCULAR ONCE
Status: COMPLETED | OUTPATIENT
Start: 2021-05-18 | End: 2021-05-18

## 2021-05-18 RX ADMIN — CETIRIZINE HYDROCHLORIDE 10 MG: 10 TABLET, FILM COATED ORAL at 08:42

## 2021-05-18 RX ADMIN — FOLIC ACID 1 MG: 1 TABLET ORAL at 08:43

## 2021-05-18 RX ADMIN — MIRABEGRON 50 MG: 50 TABLET, FILM COATED, EXTENDED RELEASE ORAL at 18:18

## 2021-05-18 RX ADMIN — LIOTHYRONINE SODIUM 25 MCG: 25 TABLET ORAL at 21:00

## 2021-05-18 RX ADMIN — GUANFACINE 1 MG: 1 TABLET ORAL at 22:44

## 2021-05-18 RX ADMIN — PROPAFENONE HYDROCHLORIDE 150 MG: 150 TABLET, FILM COATED ORAL at 14:35

## 2021-05-18 RX ADMIN — PROPAFENONE HYDROCHLORIDE 150 MG: 150 TABLET, FILM COATED ORAL at 22:44

## 2021-05-18 RX ADMIN — PROPAFENONE HYDROCHLORIDE 150 MG: 150 TABLET, FILM COATED ORAL at 07:32

## 2021-05-18 RX ADMIN — MEMANTINE 10 MG: 10 TABLET ORAL at 08:43

## 2021-05-18 RX ADMIN — MEMANTINE 10 MG: 10 TABLET ORAL at 21:00

## 2021-05-18 RX ADMIN — PREGABALIN 100 MG: 50 CAPSULE ORAL at 20:59

## 2021-05-18 RX ADMIN — ATORVASTATIN CALCIUM 80 MG: 40 TABLET, FILM COATED ORAL at 20:59

## 2021-05-18 RX ADMIN — LEVOTHYROXINE SODIUM 150 MCG: 75 TABLET ORAL at 21:00

## 2021-05-18 RX ADMIN — PREGABALIN 100 MG: 50 CAPSULE ORAL at 08:42

## 2021-05-18 RX ADMIN — VENLAFAXINE HYDROCHLORIDE 300 MG: 150 CAPSULE, EXTENDED RELEASE ORAL at 08:42

## 2021-05-18 RX ADMIN — ASPIRIN 81 MG: 81 TABLET, COATED ORAL at 08:43

## 2021-05-18 RX ADMIN — LAMOTRIGINE 25 MG: 25 TABLET ORAL at 21:00

## 2021-05-18 RX ADMIN — PREGABALIN 100 MG: 50 CAPSULE ORAL at 14:35

## 2021-05-18 RX ADMIN — WARFARIN SODIUM 7.5 MG: 5 TABLET ORAL at 18:18

## 2021-05-18 RX ADMIN — SODIUM CHLORIDE 80 ML: 9 INJECTION, SOLUTION INTRAVENOUS at 11:07

## 2021-05-18 RX ADMIN — DONEPEZIL HYDROCHLORIDE 20 MG: 10 TABLET ORAL at 22:44

## 2021-05-18 RX ADMIN — FOLIC ACID 4 MG: 1 TABLET ORAL at 09:59

## 2021-05-18 RX ADMIN — IOPAMIDOL 135 ML: 755 INJECTION, SOLUTION INTRAVENOUS at 11:05

## 2021-05-18 ASSESSMENT — ACTIVITIES OF DAILY LIVING (ADL): ADLS_ACUITY_SCORE: 18

## 2021-05-18 NOTE — PROGRESS NOTES
05/18/21 0918   Quick Adds   Type of Visit Initial Occupational Therapy Evaluation   Living Environment   People in home child(morgan), adult;spouse;grandchild(morgan)   Current Living Arrangements house   Home Accessibility stairs to enter home;stairs within home   Number of Stairs, Main Entrance   (14)   Stair Railings, Main Entrance railing on right side (ascending)   Number of Stairs, Within Home, Primary   (14 stairs)   Stair Railings, Within Home, Primary railing on right side (ascending)   Transportation Anticipated family or friend will provide   Self-Care   Usual Activity Tolerance moderate   Regular Exercise Yes   Activity/Exercise Type   (PT exercises)   Equipment Currently Used at Home cane, straight;walker, standard;raised toilet seat   Disability/Function   Hearing Difficulty or Deaf no   Wear Glasses or Blind no   Fall history within last six months yes   Number of times patient has fallen within last six months 1  (January)   General Information   Onset of Illness/Injury or Date of Surgery 05/17/21   Referring Physician Jamshid Weber PA   Patient/Family Therapy Goal Statement (OT) Return home   Additional Occupational Profile Info/Pertinent History of Current Problem Todd S Aschoff is a 65 year old male with PMH Aortic valve Dz s/p mechanical valve replacement on chronic coumadin, Atrial fib, HLP, Hypothyroidism, Obesity, Dementia, MDD with anxiety, overactive bladder and possible seizure disorder with recent history of CVA (1/2021) and (4/2021).  Patient presenting now with sudden onset of numbness to upper and lower lips as well as tingling in the left thumb, index, and middle finger at 0930 on 5/17/2021.  Patient is registered to observation for further CVA/TIA work-up.   Performance Patterns (Routines, Roles, Habits) Retired   Existing Precautions/Restrictions no known precautions/restrictions   Cognitive Status Examination   Orientation Status orientation to person, place and time    Visual Perception   Visual Impairment/Limitations WFL   Sensory   Sensory Comments Slight numbness around lips, but other numbess in L hand has resolved   Pain Assessment   Patient Currently in Pain No   Range of Motion Comprehensive   Comment, General Range of Motion Limited but WFL d/t R shoulder bursitis   Strength Comprehensive (MMT)   General Manual Muscle Testing (MMT) Assessment no strength deficits identified   Bed Mobility   Bed Mobility supine-sit   Supine-Sit [place] (Bed Mobility) modified independence   Assistive Device (Bed Mobility) bed rails   Transfers   Transfers sit-stand transfer   Sit-Stand Transfer   Sit-Stand [place] (Transfers) supervision   Activities of Daily Living   BADL Assessment/Intervention lower body dressing   Lower Body Dressing Assessment/Training   [place] Level (Lower Body Dressing) supervision   Position (Lower Body Dressing) edge of bed sitting   Instrumental Activities of Daily Living (IADL)   IADL Comments Family assists w/ IADLs   Clinical Impression   Criteria for Skilled Therapeutic Interventions Met (OT) yes;meets criteria   OT Diagnosis Decreased ADL status   OT Problem List-Impairments impacting ADL problems related to;mobility   Assessment of Occupational Performance 1-3 Performance Deficits   Identified Performance Deficits Functional mobility   Planned Therapy Interventions (OT) transfer training;progressive activity/exercise   Clinical Decision Making Complexity (OT) low complexity   Therapy Frequency (OT) 1x eval and treat   Predicted Duration of Therapy 1 day   Risk & Benefits of therapy have been explained evaluation/treatment results reviewed;care plan/treatment goals reviewed;risks/benefits reviewed;current/potential barriers reviewed;participants voiced agreement with care plan;participants included;patient   OT Discharge Planning    OT Discharge Recommendation (DC Rec) Home with assist   OT Rationale for DC Rec Pt at baseline for ADL  performance. Pt reports weakness in R buttock area. Will defer to PT for further tx as indicated. Pt safe to return home from OT/functional standpoint.   Therapy Certification   Start of Care Date 05/18/21   Certification date from 05/18/21   Certification date to 05/18/21   Total Evaluation Time (Minutes)   Total Evaluation Time (Minutes) 10

## 2021-05-18 NOTE — PLAN OF CARE
-diagnostic tests and consults completed and resulted Not Met  -vital signs normal or at patient baseline Met  -tolerating oral intake to maintain hydration Met  -returns to baseline functional status Partially Met--numbness/tingling improving since hospitalization per pt  -safe disposition plan has been identified Met      Pt numbness and tingling resolved in L hand and significantly improved in lips/mouth per pt; otherwise Neuros grossly intact.  Tele--NSR.  Contact isolation for MRSA Hx in lumbar wound. Plan of care pending work-up results.

## 2021-05-18 NOTE — PROGRESS NOTES
Addendum           Observation goals to meet prior to discharge:   -diagnostic tests and consults completed and resulted.   Not met.  Neurology following.   -vital signs normal or at patient baseline.  Met.    -tolerating oral intake to maintain hydration.  Met.   -returns to baseline functional status.  Met.    -safe disposition plan has been identified  Met.  Plans to discharge to home once medically cleared.

## 2021-05-18 NOTE — UTILIZATION REVIEW
[facility] Utilization Review  Admission Status; Secondary Review Determination     Admission Date: 5/17/2021 10:25 AM      Under the authority of the Utilization Management Committee, the utilization review process indicated a secondary review on the above patient.  The review outcome is based on review of the medical records, discussions with staff, and applying clinical experience noted on the date of the review.        (X)      Inpatient Status Appropriate - This patient's medical care is consistent with medical management for inpatient care and reasonable inpatient medical practice.          RATIONALE FOR DETERMINATION   55-year-old male with history of aortic valve disease status post mechanical valve replacement on chronic Coumadin, atrial fibrillation, hyperlipidemia, thyroidism, obesity, dementia, MDD with anxiety, overactive bladder, seizure disorder with recent CVA, admitted with numbness and weakness on the left.  Recent brain MRI showed acute small infarcts scattered throughout the left greater than right cerebral hemisphere.  Patient also has weakness in the left leg with symptoms similar to previous CVA but on the opposite side.  INR therapeutic, neurology team consulted, needs brain MRI to evaluate for stroke.  CT chest abdomen pelvis shows no evidence of malignancy, MRI brain shows new infarct in the right thalamus.  Patient continues to have numbness and tingling in the lips, complex patient who needs close monitoring in the hospital and has new infarct, and is at risk of acute decompensation with new stroke, recommend change to inpatient status, communicated to ALISSON Rae      The severity of illness, intensity of service provided, expected LOS and risk for adverse outcome make the care complex, high risk and appropriate for hospital admission.The patient requires hospital based medical care which is anticipated to require a stay of 2 or more midnights.        The information on  this document is developed by the utilization review team in order for the business office to ensure compliance.  This only denotes the appropriateness of proper admission status and does not reflect the quality of care rendered.              Sincerely,       Natalie Weller MD  Physician Advisor  Utilization Review-[place]    Phone: [phone]

## 2021-05-18 NOTE — PROGRESS NOTES
[facility]      OUTPATIENT OCCUPATIONAL THERAPY  EVALUATION  PLAN OF TREATMENT FOR OUTPATIENT REHABILITATION  (COMPLETE FOR INITIAL CLAIMS ONLY)  Patient's Last Name, First Name, M.I.  YOB: 1955  Aschoff,Todd S                          Provider's Name  [facility] Medical Record No.  [id number]                               Onset Date:  05/17/21   Start of Care Date:  (P) 05/18/21     Type:     ___PT   _X_OT   ___SLP Medical Diagnosis:                           OT Diagnosis:  (P) Decreased ADL status   Visits from SOC:  1   _________________________________________________________________________________  Plan of Treatment/Functional Goals    Planned Interventions: (P) transfer training, progressive activity/exercise   Goals: See Occupational Therapy Goals on Care Plan in [employer] electronic health record.    Therapy Frequency: (P) 1x eval and treat  Predicted Duration of Therapy Intervention: (P) 1 day  _________________________________________________________________________________    I CERTIFY THE NEED FOR THESE SERVICES FURNISHED UNDER        THIS PLAN OF TREATMENT AND WHILE UNDER MY CARE     (Physician co-signature of this document indicates review and certification of the therapy plan).                Certification date from: (P) 05/18/21, Certification date to: (P) 05/18/21    Referring Physician: Jamshid Weber PA            Initial Assessment        See Occupational Therapy evaluation dated (P) 05/18/21 in Epic electronic health record.

## 2021-05-18 NOTE — PLAN OF CARE
-diagnostic tests and consults completed and resulted Not Met  -vital signs normal or at patient baseline Met  -tolerating oral intake to maintain hydration Met  -returns to baseline functional status Partially Met--numbness/tingling improving since hospitalization per pt  -safe disposition plan has been identified Met

## 2021-05-18 NOTE — PROGRESS NOTES
Observation goals to meet prior to discharge:   -diagnostic tests and consults completed and resulted.   Partially met.  Neurology following. CT of abd/chest ordered and completed.    -vital signs normal or at patient baseline.  Met.    -tolerating oral intake to maintain hydration.  Met.   -returns to baseline functional status.  Met.    -safe disposition plan has been identified  Met.  Plans to discharge to home once medically cleared.     Possible discharge pending resulted Chest CT.

## 2021-05-18 NOTE — PLAN OF CARE
Pt A/O x 4. Flat affect. No change in neuros. Slow to respond at times. Continues to have numbness around lips and tongue. Left fingers and leg numbness improving. MRI completed this shift. Incontinent at times. Up with assist of one. Able to make needs known.

## 2021-05-18 NOTE — PROGRESS NOTES
"[facility]    Stroke Progress Note    Interval Events  No acute neurological events overnight. Patient close to baseline this AM. Still have some numbness in mouth/lips/gum     Impression  #Ischemic Stroke due to small-vessel occlusion    Hx of MV replacement  Hx of Afib    Plan  Increase lipitor to 80 mg   Will order ASA function test   Continue PTA and coumadin   Will order CT CAP to work up hypercoagulable 2/2 malignancy given recurrent stroke  Will consider switch ASA to plavix after work up completed.      The Stroke Staff is Dr. Garcia.    Dell Bauman MD  Vascular Neurology Fellow  To page me or covering stroke neurology team member, click here: AMCOM   Choose \"On Call\" tab at top, then search dropdown box for \"Neurology Adult\", select location, press Enter, then look for stroke/neuro ICU/telestroke.    ______________________________________________________    Medications   Home Meds  Prior to Admission medications    Medication Sig Start Date End Date Taking? Authorizing Provider   acetaminophen (TYLENOL) 500 MG tablet Take 1-2 tablets (500-1,000 mg) by mouth every 8 hours as needed for mild pain 2/7/21  Yes Aurelio Etienne,    aspirin (ASA) 81 MG EC tablet Take 1 tablet (81 mg) by mouth daily 4/27/21  Yes Jc Friedman MD   atorvastatin (LIPITOR) 40 MG tablet Take 1 tablet (40 mg) by mouth every evening 4/26/21  Yes Jc Friedman MD   azelastine (ASTELIN) 0.1 % nasal spray USE 2 SPRAYS IN NOSTRIL 2 TIMES DAILY AS NEEDED. 5/29/20  Yes Obdulio Ingram MD   cetirizine (ZYRTEC) 10 MG tablet Take 10 mg by mouth every morning   Yes Unknown, Entered By History   cyclobenzaprine (FLEXERIL) 10 MG tablet TAKE ONE TABLET BY MOUTH THREE TIMES DAILY AS NEEDED FOR MUSCLE SPASM.  Patient taking differently: Take 10 mg by mouth 3 times daily  3/9/21  Yes Obdulio Ingram MD   diazepam (VALIUM) 5 MG tablet TAKE ONE TABLET BY MOUTH EVERY EIGHT HOURS AS NEEDED FOR ANXIETY OR " MUSCLE SPASM 4/28/21  Yes Obdulio Ingram MD   donepezil (ARICEPT) 10 MG tablet Take 2 tablets (20 mg) by mouth At Bedtime Stop if diarrhea or nausea develop 2/22/21  Yes Jody Tanner MD   folic acid (FOLVITE) 1 MG tablet Take 5 tablets (5 mg) by mouth daily 2/22/21  Yes Jody Tanner MD   furosemide (LASIX) 20 MG tablet Take 1-2 tablets (20-40 mg) by mouth daily as needed (leg swelling) 4/15/21  Yes Obdulio Ingram MD   guanFACINE (TENEX) 1 MG tablet Take 1 tablet (1 mg) by mouth At Bedtime 4/5/21  Yes Savi Ken CNP   lamoTRIgine (LAMICTAL) 25 MG tablet Take 1 tablet (25 mg) by mouth daily  Patient taking differently: Take 25 mg by mouth every evening  2/22/21  Yes Jody Tanner MD   levothyroxine (SYNTHROID/LEVOTHROID) 150 MCG tablet Take 1 tablet (150 mcg) by mouth daily  Patient taking differently: Take 150 mcg by mouth every evening  12/11/20  Yes Obdulio Ingram MD   liothyronine (CYTOMEL) 25 MCG tablet Take 1 tablet (25 mcg) by mouth daily  Patient taking differently: Take 25 mcg by mouth every evening  2/22/21  Yes Jody Tanner MD   memantine (NAMENDA) 10 MG tablet Take 1 tablet (10 mg) by mouth 2 times daily 5/6/21  Yes Obdulio Ingram MD   mirabegron (MYRBETRIQ) 50 MG 24 hr tablet Take 1 tablet (50 mg) by mouth daily  Patient taking differently: Take 50 mg by mouth every evening  12/31/20  Yes Obdulio Ingram MD   potassium chloride ER (K-TAB) 20 MEQ CR tablet Take 1 tablet (20 mEq) by mouth daily as needed (Take one tablet for each furosemide tablet that is taken) 4/15/21  Yes Obdulio Ingram MD   pregabalin (LYRICA) 100 MG capsule Take 1 capsule (100 mg) by mouth 3 times daily Do not take if sleepy/sedated. 4/1/21  Yes Savi Ken CNP   propafenone (RYTHMOL) 150 MG TABS tablet Take 1 tablet (150 mg) by mouth every 8 hours 12/10/20  Yes Obdulio Ingram MD   senna-docusate (SENOKOT-S;PERICOLACE) 8.6-50 MG per tablet Take 1 tablet by mouth 2 times daily as needed  for constipation 8/2/18  Yes Obdulio Ingram MD   venlafaxine (EFFEXOR-XR) 150 MG 24 hr capsule Take 2 capsules (300 mg) by mouth daily  Patient taking differently: Take 300 mg by mouth daily Takes in the afternoon. 1/20/21  Yes Obdulio Ingram MD   warfarin ANTICOAGULANT (COUMADIN) 2.5 MG tablet Take 7.5 mg by mouth three times a week Mon, Tues, Thurs   Yes Unknown, Entered By History   warfarin ANTICOAGULANT (COUMADIN) 2.5 MG tablet Take 5 mg by mouth four times a week Wed, Fri, Sat & Sun   Yes Unknown, Entered By History       Scheduled Meds    aspirin  81 mg Oral Daily     atorvastatin  80 mg Oral QPM     cetirizine  10 mg Oral QAM     donepezil  20 mg Oral At Bedtime     folic acid  5 mg Oral Daily     guanFACINE  1 mg Oral At Bedtime     lamoTRIgine  25 mg Oral QPM     levothyroxine  150 mcg Oral QPM     liothyronine  25 mcg Oral QPM     memantine  10 mg Oral BID     mirabegron  50 mg Oral QPM     pregabalin  100 mg Oral TID     propafenone  150 mg Oral Q8H     sodium chloride (PF)  3 mL Intracatheter Q8H     venlafaxine  300 mg Oral Daily       Infusion Meds    Warfarin Therapy Reminder         PRN Meds  acetaminophen, acetaminophen, diazepam, lidocaine 4%, lidocaine (buffered or not buffered), melatonin, ondansetron **OR** ondansetron, polyethylene glycol, senna-docusate **OR** senna-docusate, sodium chloride (PF), Warfarin Therapy Reminder       PHYSICAL EXAMINATION  Temp:  [97.6  F (36.4  C)-98  F (36.7  C)] 97.7  F (36.5  C)  Pulse:  [69-80] 71  Resp:  [9-20] 16  BP: (109-156)/(69-96) 138/83  SpO2:  [93 %-97 %] 97 %     Neurologic  Mental Status:  alert, oriented x 3, follows commands, speech clear and fluent, naming and repetition normal  Cranial Nerves:  visual fields intact, PERRL, EOMI with normal smooth pursuit, facial sensation intact and symmetric, facial movements symmetric, hearing not formally tested but intact to conversation, palate elevation symmetric and uvula midline, no dysarthria,  shoulder shrug strong bilaterally, tongue protrusion midline  Motor:  normal muscle tone and bulk, no abnormal movements, able to move all limbs spontaneously, strength 5/5 throughout upper and lower extremities, no pronator drift  Reflexes:  toes down-going  Sensory:  light touch sensation intact and symmetric throughout upper and lower extremities, no extinction on double simultaneous stimulation   Coordination:  normal finger-to-nose and heel-to-shin bilaterally without dysmetria, rapid alternating movements symmetric  Station/Gait:  deferred     Stroke Scales    NIHSS  Interval transfer (05/17/21 1252)   Interval Comments     1a. Level of Consciousness 0-->Alert, keenly responsive   1b. LOC Questions 0-->Answers both questions correctly   1c. LOC Commands 0-->Performs both tasks correctly   2.   Best Gaze 0-->Normal   3.   Visual 0-->No visual loss   4.   Facial Palsy 0-->Normal symmetrical movements   5a. Motor Arm, Left 0-->No drift, limb holds 90 (or 45) degrees for full 10 secs   5b. Motor Arm, Right 0-->No drift, limb holds 90 (or 45) degrees for full 10 secs   6a. Motor Leg, Left 0-->No drift, leg holds 30 degree position for full 5 secs   6b. Motor Leg, right 0-->No drift, leg holds 30 degree position for full 5 secs   7.   Limb Ataxia 0-->Absent   8.   Sensory 0-->Normal, no sensory loss   9.   Best Language 0-->No aphasia, normal   10. Dysarthria 0-->Normal   11. Extinction and Inattention  0-->No abnormality   Total 0 (05/17/21 1252)       Imaging  I personally reviewed all imaging; relevant findings per HPI.     Lab Results Data   CBC  Recent Labs   Lab 05/18/21  0610 05/17/21  1026   WBC 5.7 6.3   RBC 4.56 4.64   HGB 13.5 13.8   HCT 41.1 41.8    235     Basic Metabolic Panel    Recent Labs   Lab 05/18/21  0610 05/17/21  1026    142   POTASSIUM 3.7 3.6   CHLORIDE 106 107   CO2 32 34*   BUN 14 16   CR 1.00 1.14   GLC 87 118*   CATERINA 8.7 8.7     Liver Panel  No results for input(s):  PROTTOTAL, ALBUMIN, BILITOTAL, ALKPHOS, AST, ALT, BILIDIRECT in the last 168 hours.  INR    Recent Labs   Lab Test 05/18/21  0610 05/17/21  1048 05/14/21   INR 3.04* 2.87* 3.3*      Lipid Profile    Recent Labs   Lab Test 04/18/21  1759 01/23/21  0748 12/11/20  1545 12/24/14  0846 12/24/14  0846 12/17/13  1012   CHOL 254* 135 226*   < > 166 170   HDL 43 44 39*   < > 41 30*   * 65 149*   < > 78 74   TRIG 233* 130 192*   < > 234* 331*   CHOLHDLRATIO  --   --   --   --  4.0 5.7*    < > = values in this interval not displayed.     A1C    Recent Labs   Lab Test 01/23/21  0747   A1C 4.9     Troponin I    Recent Labs   Lab 05/17/21  1026   TROPI <0.015

## 2021-05-18 NOTE — PHARMACY-PHARMACOTHERAPY NOTE
Pharmacy Consult to evaluate for medication related stroke core measures    Todd S Aschoff, 65 year old male admitted for CVA on 5/17/2021.    Thrombolytic was not given because of minor/isolated/quickly resolving symptoms and warfarin dose within 48 hours, INR > 1.7    VTE Prophylaxis warfarin given on 5-17 as appropriate prior to end of hospital day 2.    Antithrombotic: warfarin and aspirin started on 5-17, as appropriate by end of hospital day 2. Continue antithrombotic therapy on discharge to meet quality measures, unless contraindicated.    Anticoagulation if history of A-fib/flutter: Patient on warfarin; continue anticoagulation on discharge to meet quality measures, unless contraindicated.    LDL Cholesterol Calculated   Date Value Ref Range Status   04/18/2021 164 (H) <100 mg/dL Final     Comment:     Above desirable:  100-129 mg/dl  Borderline High:  130-159 mg/dL  High:             160-189 mg/dL  Very high:       >189 mg/dl         Patient currently receiving Lipitor (atorvastatin) continue statin on discharge to meet quality measures, unless contraindicated. MD increased to 80 mg daily.    Recommendations: None at this time    Thank you for the consult.    Angelito Smalls [facility] 5/18/2021 8:51 AM

## 2021-05-18 NOTE — PROGRESS NOTES
[facility]    Hospitalist Progress Note    Date of Service (when I saw the patient): 05/18/2021    Assessment & Plan   Todd S Aschoff is a 65 year old male with PMH Aortic valve Dz s/p mechanical valve replacement on chronic coumadin, Atrial fib, HLP, Hypothyroidism, Obesity, Dementia, MDD with anxiety, overactive bladder and possible seizure disorder with recent history of CVA (1/2021) and (4/2021).  Patient presenting now with sudden onset of numbness to upper and lower lips as well as tingling in the left thumb, index, and middle finger at 0930 on 5/17/2021.  Patient is admitted for further CVA work-up.     Acute numbness/tingling of lips with left upper extremity numbness/tingling and weakness  Acute CVA in right thalamus  Recent CVA  Patient recently admitted to hospital 4/18-4/26 with right extremity weakness.  At that time, Head CT and CTA Head/Neck negative for hemorrhage or LVO.  Brain MRI 4/18/2021 confirmed small acute infarcts scattered throughout the left greater than the right cerebral hemisphere, multiple old infarcts.  INR was subtherapeutic at that time, but patient was started on Lovenox and eventually bridged back to warfarin.  Echocardiogram showed EF of 55-60%, RV normal size and function.  Noted mech aortic valve that is poorly visualized.  Negative bubble study.  Similar findings when compared to previous ECHO 1/2021.       Patient has reportedly been doing well since that time, but developed sudden onset of numbness and tingling to his upper and lower lips as well as tingling of the left thumb, index, and middle finger.  He also reported weakness in his left leg that started at the same time.  His symptoms are similar to his previous CVA but on the opposite side now.  EMS notes that the patient had an unsteady gait.  Blood glucose 159 by EMS.  Here in the ER code stroke was initiated.  Neurology evaluated the patient.  Patient is not a candidate for thrombolytics  given INR greater than 1.7.  CT/CTA negative for LVO.  Vital signs are stable.  /82, heart rate 70 bpm, temperature 98, satting in the 90s on room air.  INR therapeutic at 2.87.  Troponin negative.  Routine CBC and BMP unremarkable.    --Neurology consulted  --Continue daily aspirin, consider addition of Plavix per neurology  --Continue warfarin, with INR goal 2.5-3.5  --Brain MRI shows new subcentimeter infarct in the right thalamus that was not present on 4/18/2021.  No evidence of hemorrhage, mass-effect or midline shift.  --No need to repeat echo at this time  --Permissive hypertension  --Telemetry  --PT/OT/SLP  --- Medical optimization per neurology.  Consider outpatient hematology or vascular medicine referral given patient has had repeat CVA despite being on appropriate anticoagulation on warfarin with therapeutic INR.     History of infected left lower extremity hematoma with cellulitis  S/p I&D (1/10, 1/26 with wound vac) and skin graft (2/3)  S/p evacuation of infected hematoma and split thickness skin graft 1/2021.  --This appears to be well-healed.  No evidence for rash/cellulitis.  --On last admission, left LE Venous US negative for DVT and thin crescent shaped fluid collection along the left calf representing known hematoma.     Mechanical Aortic Valve  --Continue warfarin with pharmacy to dose.  INR goal 2.5 to 3.5     Seizure D/O, potential  Follows with Neurology as an outpatient and during previous admission 1/2021 where incidental right frontal CVA found patient was also started on Lamictal 25 mg/d for potential seizure.    - Continue PTA Lamictal     HLP: Managed PTA with Lipitor, which was switched from simvastatin on previous admission given LDL of 164.   --Will continue Lipitor, with possible dose increase     Obesity  Body mass index is Body mass index is 39.61 kg/m .    Increase in all-cause morbidity and mortality.   - Recommend sleep study as an outpatient.            Diet:  Combination Diet Regular Diet Adult; Thin Liquids (water, ice chips, juice, milk, gelatin, ice cream, etc)     DVT Prophylaxis: Warfarin   Zimmer Catheter: not present  Code Status: Full Code        Disposition Plan      Expected discharge: Likely in 1-2 days pending neurology input, MRI of brain, as well as PT/OT/SLP eval.  SW/CC consulted to assist with discharge planning.    Jamshid Brownlee Weber    Interval History   Patient doing better.  Denies pain.  Up independently.  Tolerating diet.  CT chest/abdomen/pelvis pending.  Numbness and tingling in hands has resolved.  Patient still has lingering numbness/tingling in lips.  Telemetry normal sinus rhythm.  Good urine output.  Reviewed MRI results with the patient, questions answered.    -Data reviewed today: I reviewed all new labs and imaging results over the last 24 hours.    Physical Exam   Temp: 97.7  F (36.5  C) Temp src: Oral BP: 138/83 Pulse: 71   Resp: 16 SpO2: 97 % O2 Device: None (Room air)    Vitals:    05/17/21 1029 05/17/21 1500   Weight: 139 kg (306 lb 7 oz) 141.5 kg (312 lb)     Vital Signs with Ranges  Temp:  [97.6  F (36.4  C)-98  F (36.7  C)] 97.7  F (36.5  C)  Pulse:  [69-80] 71  Resp:  [9-20] 16  BP: (109-156)/(69-96) 138/83  SpO2:  [93 %-97 %] 97 %  I/O last 3 completed shifts:  In: 300 [P.O.:300]  Out: 450 [Urine:450]    Constitutional: Alert, oriented to person, place, date, situation.  Cooperative, lying in bed in NAD.   Respiratory:  Lungs CTAB.  No crackles, wheezes, or rhonchi, no labored breathing.  Cardiovascular:  Heart RRR, no murmurs, no edema.  GI:  Abdomen soft, NT/ND and with normoactive BS  Skin/Integumen:  Warm, dry, non-diaphoretic.  MSK: Moves all 4 extremities equally.   strength equal bilaterally.  Neuro: CN II-XII grossly intact, coordination normal, speech normal, no facial droop.  No obvious focal deficits.    Medications     Warfarin Therapy Reminder         aspirin  81 mg Oral Daily     atorvastatin  40 mg Oral QPM      cetirizine  10 mg Oral QAM     donepezil  20 mg Oral At Bedtime     folic acid  5 mg Oral Daily     guanFACINE  1 mg Oral At Bedtime     lamoTRIgine  25 mg Oral QPM     levothyroxine  150 mcg Oral QPM     liothyronine  25 mcg Oral QPM     memantine  10 mg Oral BID     mirabegron  50 mg Oral QPM     pregabalin  100 mg Oral TID     propafenone  150 mg Oral Q8H     sodium chloride (PF)  3 mL Intracatheter Q8H     venlafaxine  300 mg Oral Daily       Data   Recent Labs   Lab 05/18/21  0610 05/17/21  1048 05/17/21  1026 05/14/21   WBC 5.7  --  6.3  --    HGB 13.5  --  13.8  --    MCV 90  --  90  --      --  235  --    INR 3.04* 2.87*  --  3.3*     --  142  --    POTASSIUM 3.7  --  3.6  --    CHLORIDE 106  --  107  --    CO2 32  --  34*  --    BUN 14  --  16  --    CR 1.00  --  1.14  --    ANIONGAP 2*  --  1*  --    CATERINA 8.7  --  8.7  --    GLC 87  --  118*  --    TROPI  --   --  <0.015  --        Recent Results (from the past 24 hour(s))   CT Head w/o Contrast    Narrative    CT SCAN OF THE HEAD WITHOUT CONTRAST   5/17/2021 10:41 AM     HISTORY: Neuro deficit, acute, stroke suspected. Left-sided numbness.  Recent stroke.    TECHNIQUE:  Axial images of the head and coronal reformations without  IV contrast material.  Radiation dose for this scan was reduced using  automated exposure control, adjustment of the mA and/or kV according  to patient size, or iterative reconstruction technique.    COMPARISON: 4/18/2021    FINDINGS: There is an old right cerebellar infarct and old right  lateral occipital infarct. Mild cerebral atrophy is present. The brain  parenchyma is otherwise normal. There is no evidence for intracranial  hemorrhage, mass effect, acute infarct, or skull fracture. Visualized  paranasal sinuses and mastoid air cells are clear.      Impression    IMPRESSION: Chronic changes. No evidence for intracranial hemorrhage  or any acute process.    CONCHITA SNOW MD   CTA Head Neck with Contrast     Narrative    CTA  HEAD/NECK WITH CONTRAST May 17, 2021 10:41 AM     HISTORY: Neuro deficit, acute, stroke suspected. Sudden onset of  numbness in lips, tingling, and left leg weakness.    TECHNIQUE: Multiplanar multisequence images were obtained through the  head and neck with intravenous contrast.    COMPARISON: 4/18/2021.    FINDINGS:    Brachiocephalic vessels: There is some mild atherosclerotic disease  involving the thoracic arch and some proximal brachiocephalic vessels  without stenosis. There is direct origin of the left vertebral artery  off the arch which is an anatomic variant.    Right carotid system: Mild calcific plaque is present in the proximal  internal carotid artery without stenosis. There is no evidence for  dissection.    Left carotid system: There is some mild calcific plaque in the carotid  bifurcation. There is no evidence for stenosis or dissection.    Right vertebral artery: There is some mild plaque at the origin and  distally but no stenosis or dissection is present.    Left vertebral artery: There is some mild calcific plaque at the  origin but there is no stenosis or dissection.    [place]: The basilar artery is patent. The distal internal  carotid arteries are also patent. The proximal anterior, middle, and  posterior cerebral arteries are normal in appearance. There is no  evidence for aneurysm or thromboembolism.      Impression    IMPRESSION:  1. Mild atherosclerotic disease involving both carotid bifurcations in  the proximal brachiocephalic vessels without stenosis.  2. No evidence for significant stenosis, dissection, thromboembolism,  or aneurysm.    CONCHITA SNOW MD   MR Brain w/o & w Contrast    Narrative    MRI BRAIN WITHOUT AND WITH CONTRAST  5/17/2021 8:10 PM     HISTORY:  Neurologic deficit, acute, stroke suspected.    TECHNIQUE:  Multiplanar, multisequence MRI of the brain without and  with 14 mL Gadavist.    COMPARISON: Head CT from earlier the same day. Brain MR  4/18/2021.     FINDINGS: New 0.6 cm area of restricted diffusion in the right  thalamus concerning for an acute infarct. Mild associated T2  hyperintensity in this area. No other regions of restricted diffusion.  No significant mass effect or midline shift. No evidence of acute  intracranial hemorrhage. Mild diffuse parenchymal volume loss.  Chronic-appearing cortical infarct in the right occipital lobe with  evidence of cortical laminar necrosis. Several additional small  cortical infarcts that are similar to prior. Patchy periventricular  white matter T2 hyperdensities are nonspecific, but likely related to  chronic microvascular ischemic disease.    The facial structures appear normal.       Impression    IMPRESSION:    1. New subcentimeter infarct in the right thalamus that was not  present on 4/18/2021. No evidence of hemorrhagic transformation of  infarct. No significant mass effect or midline shift.  2. Chronic-appearing cortical infarct in the right occipital lobe with  cortical laminar necrosis. Several additional smaller cortical  infarcts are present and not significantly changed since prior.  3. Nonspecific white matter changes likely due to chronic  microvascular ischemic disease. Mild diffuse parenchymal volume loss.    LUIS WINTERS MD

## 2021-05-18 NOTE — PLAN OF CARE
RN:  Patient A/O x4.  VSS on RA.  Denies pain.  Up IND.  Tolerating diet.  Neurology following.  CT of chest/abd/pelvis completed and resulted.  Numbness and tingling in hands-resolved.  Patient states still have lingering numbness/tingling in lips.  Tele -NSR.  Good urine output.  Discharge pending progress.

## 2021-05-18 NOTE — PLAN OF CARE
PT: Orders received, chart reviewed, discussed with RN in rounds. Pt admitted under observation status with oral numbness, L UE numbness, and L LE weakness. Pt is ambulating independently and moving safely. No concerns from nursing. Pt has no skilled PT needs at this time. PT to complete orders.

## 2021-05-19 ENCOUNTER — TELEPHONE (OUTPATIENT)
Dept: INTERNAL MEDICINE | Facility: CLINIC | Age: 66
End: 2021-05-19

## 2021-05-19 VITALS
OXYGEN SATURATION: 96 % | BODY MASS INDEX: 41.35 KG/M2 | WEIGHT: 312 LBS | SYSTOLIC BLOOD PRESSURE: 141 MMHG | TEMPERATURE: 97.7 F | DIASTOLIC BLOOD PRESSURE: 83 MMHG | RESPIRATION RATE: 16 BRPM | HEART RATE: 79 BPM | HEIGHT: 73 IN

## 2021-05-19 DIAGNOSIS — Z95.2 AORTIC VALVE PROSTHESIS PRESENT: ICD-10-CM

## 2021-05-19 DIAGNOSIS — I48.20 CHRONIC ATRIAL FIBRILLATION (H): ICD-10-CM

## 2021-05-19 DIAGNOSIS — Z79.01 LONG TERM CURRENT USE OF ANTICOAGULANT THERAPY: ICD-10-CM

## 2021-05-19 DIAGNOSIS — I48.0 PAROXYSMAL ATRIAL FIBRILLATION (H): ICD-10-CM

## 2021-05-19 LAB
ANION GAP SERPL CALCULATED.3IONS-SCNC: 3 MMOL/L (ref 3–14)
BUN SERPL-MCNC: 15 MG/DL (ref 7–30)
CALCIUM SERPL-MCNC: 8.7 MG/DL (ref 8.5–10.1)
CHLORIDE SERPL-SCNC: 109 MMOL/L (ref 94–109)
CO2 SERPL-SCNC: 29 MMOL/L (ref 20–32)
CREAT SERPL-MCNC: 0.94 MG/DL (ref 0.66–1.25)
ERYTHROCYTE [DISTWIDTH] IN BLOOD BY AUTOMATED COUNT: 14.5 % (ref 10–15)
GFR SERPL CREATININE-BSD FRML MDRD: 85 ML/MIN/{1.73_M2}
GLUCOSE SERPL-MCNC: 89 MG/DL (ref 70–99)
HCT VFR BLD AUTO: 39.7 % (ref 40–53)
HGB BLD-MCNC: 13.4 G/DL (ref 13.3–17.7)
INR PPP: 3.06 (ref 0.86–1.14)
MCH RBC QN AUTO: 29.9 PG (ref 26.5–33)
MCHC RBC AUTO-ENTMCNC: 33.8 G/DL (ref 31.5–36.5)
MCV RBC AUTO: 89 FL (ref 78–100)
PLATELET # BLD AUTO: 211 10E9/L (ref 150–450)
POTASSIUM SERPL-SCNC: 3.7 MMOL/L (ref 3.4–5.3)
RBC # BLD AUTO: 4.48 10E12/L (ref 4.4–5.9)
SODIUM SERPL-SCNC: 141 MMOL/L (ref 133–144)
WBC # BLD AUTO: 6.6 10E9/L (ref 4–11)

## 2021-05-19 PROCEDURE — 36415 COLL VENOUS BLD VENIPUNCTURE: CPT | Performed by: PHYSICIAN ASSISTANT

## 2021-05-19 PROCEDURE — 85027 COMPLETE CBC AUTOMATED: CPT | Performed by: PHYSICIAN ASSISTANT

## 2021-05-19 PROCEDURE — 250N000013 HC RX MED GY IP 250 OP 250 PS 637: Performed by: PHYSICIAN ASSISTANT

## 2021-05-19 PROCEDURE — 99239 HOSP IP/OBS DSCHRG MGMT >30: CPT | Performed by: PHYSICIAN ASSISTANT

## 2021-05-19 PROCEDURE — 80048 BASIC METABOLIC PNL TOTAL CA: CPT | Performed by: PHYSICIAN ASSISTANT

## 2021-05-19 PROCEDURE — 85610 PROTHROMBIN TIME: CPT | Performed by: PHYSICIAN ASSISTANT

## 2021-05-19 RX ORDER — ATORVASTATIN CALCIUM 80 MG/1
80 TABLET, FILM COATED ORAL EVERY EVENING
Qty: 30 TABLET | Refills: 0 | Status: SHIPPED | OUTPATIENT
Start: 2021-05-19 | End: 2021-06-18

## 2021-05-19 RX ORDER — WARFARIN SODIUM 5 MG/1
5 TABLET ORAL
Status: DISCONTINUED | OUTPATIENT
Start: 2021-05-19 | End: 2021-05-19 | Stop reason: HOSPADM

## 2021-05-19 RX ADMIN — PROPAFENONE HYDROCHLORIDE 150 MG: 150 TABLET, FILM COATED ORAL at 06:44

## 2021-05-19 RX ADMIN — PREGABALIN 100 MG: 50 CAPSULE ORAL at 14:20

## 2021-05-19 RX ADMIN — MEMANTINE 10 MG: 10 TABLET ORAL at 08:34

## 2021-05-19 RX ADMIN — PREGABALIN 100 MG: 50 CAPSULE ORAL at 08:33

## 2021-05-19 RX ADMIN — VENLAFAXINE HYDROCHLORIDE 300 MG: 150 CAPSULE, EXTENDED RELEASE ORAL at 08:34

## 2021-05-19 RX ADMIN — FOLIC ACID 5 MG: 1 TABLET ORAL at 10:43

## 2021-05-19 RX ADMIN — PROPAFENONE HYDROCHLORIDE 150 MG: 150 TABLET, FILM COATED ORAL at 14:20

## 2021-05-19 RX ADMIN — ASPIRIN 81 MG: 81 TABLET, COATED ORAL at 08:33

## 2021-05-19 RX ADMIN — CETIRIZINE HYDROCHLORIDE 10 MG: 10 TABLET, FILM COATED ORAL at 08:34

## 2021-05-19 ASSESSMENT — ACTIVITIES OF DAILY LIVING (ADL)
TOILETING_ISSUES: NO
DIFFICULTY_EATING/SWALLOWING: NO
DOING_ERRANDS_INDEPENDENTLY_DIFFICULTY: NO
ADLS_ACUITY_SCORE: 16
CONCENTRATING,_REMEMBERING_OR_MAKING_DECISIONS_DIFFICULTY: NO
ADLS_ACUITY_SCORE: 18
ADLS_ACUITY_SCORE: 18
WALKING_OR_CLIMBING_STAIRS_DIFFICULTY: NO
ADLS_ACUITY_SCORE: 16
DIFFICULTY_COMMUNICATING: NO
DRESSING/BATHING_DIFFICULTY: NO
ADLS_ACUITY_SCORE: 16

## 2021-05-19 NOTE — DISCHARGE SUMMARY
[facility]    Discharge Summary  Hospitalist    Date of Admission:  5/17/2021  Date of Discharge:  5/19/2021  4:54 PM  Discharging Provider: Jamshid Weber  Date of Service (when I saw the patient): 5/19/21    Discharge Diagnoses   1. 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 Recent CVA  3. History of infected left lower extremity hematoma with cellulitis  4. S/p I&D (1/10, 1/26 with wound vac) and skin graft (2/3)  5. S/p evacuation of infected hematoma and split thickness skin graft 1/2021.  6. Mechanical Aortic Valve  7. Seizure D/O, potential  8. HLP  9. Obesity    History of Present Illness   Todd S Aschoff is a 65 year old male with PMH Aortic valve Dz s/p mechanical valve replacement on chronic coumadin, Atrial fib, HLP, Hypothyroidism, Obesity, Dementia, MDD with anxiety, overactive bladder and possible seizure disorder with recent history of CVA (1/2021) and (4/2021).  Patient presenting now with sudden onset of numbness to upper and lower lips as well as tingling in the left thumb, index, and middle finger at 0930 on 5/17/2021.  Patient is admitted for further CVA work-up.     Please refer to the detailed history and physical 5/17/2021 by Dr. Whittington for additional information.     Hospital Course   Acute numbness/tingling of lips with left upper extremity numbness/tingling and weakness  Acute CVA in right thalamus  Recent CVA  Patient recently admitted to hospital 4/18-4/26 with right extremity weakness.  At that time, Head CT and CTA Head/Neck negative for hemorrhage or LVO.  Brain MRI 4/18/2021 confirmed small acute infarcts scattered throughout the left greater than the right cerebral hemisphere, multiple old infarcts.  INR was subtherapeutic at that time, but patient was started on Lovenox and eventually bridged back to warfarin.  Echocardiogram showed EF of 55-60%, RV normal size  and function.  Noted mech aortic valve that is poorly visualized.  Negative bubble study.  Similar findings when compared to previous ECHO 1/2021.       Patient has reportedly been doing well since that time, but developed sudden onset of numbness and tingling to his upper and lower lips as well as tingling of the left thumb, index, and middle finger.  He also reported weakness in his left leg that started at the same time.  His symptoms are similar to his previous CVA but on the opposite side now.  EMS notes that the patient had an unsteady gait.  Blood glucose 159 by EMS.  Here in the ER code stroke was initiated.  Neurology evaluated the patient.  Patient is not a candidate for thrombolytics given INR greater than 1.7.  CT/CTA negative for LVO.  Vital signs are stable.  /82, heart rate 70 bpm, temperature 98, satting in the 90s on room air.  INR therapeutic at 2.87.  Troponin negative.  Routine CBC and BMP unremarkable.    --Neurology consulted.  --Continue daily aspirin, consider addition of Plavix per neurology  --Continue warfarin, with INR goal 2.5-3.5  --Brain MRI shows new subcentimeter infarct in the right thalamus that was not present on 4/18/2021.  No evidence of hemorrhage, mass-effect or midline shift.  --No need to repeat echo at this time  --Telemetry without event.  --PT/OT/SLP consults completed, no need for continued skilled PT/OT/SLP services as outpatient.  --Neurology has cleared the patient for discharge, and will arrange follow-up with their clinic.    --Consider outpatient hematology referral given patient has had repeat CVA despite being on appropriate anticoagulation on warfarin with therapeutic INR.  Would recommend this be arranged through primary care provider as lab work for hypercoagulable state/warfarin resistance is typically is extensive and would require repeat follow-up.     History of infected left lower extremity hematoma with cellulitis  S/p I&D (1/10, 1/26 with wound  vac) and skin graft (2/3)  S/p evacuation of infected hematoma and split thickness skin graft 1/2021.  --This appears to be well-healed.  No evidence for rash/cellulitis.  --On last admission, left LE Venous US negative for DVT and thin crescent shaped fluid collection along the left calf representing known hematoma.     Mechanical Aortic Valve  --Continue warfarin.  INR goal 2.5 to 3.5.  INR 3.0 on day of discharge.     Seizure D/O, potential  Follows with Neurology as an outpatient and during previous admission 1/2021 where incidental right frontal CVA found patient was also started on Lamictal 25 mg/d for potential seizure.    --Continue PTA Lamictal     HLP: Managed PTA with Lipitor, which was switched from simvastatin on previous admission given LDL of 164.   --Will continue Lipitor with dose increase     Obesity  Body mass index is Body mass index is 39.61 kg/m .    Increase in all-cause morbidity and mortality.   --Recommend sleep study as an outpatient.        Jamshid Weber PA-C    Significant Results and Procedures   As documented above    Pending Results   None    Code Status   Full Code       Primary Care Physician   Obdulio Ingram MD    Physical Exam   Temp: 97.7  F (36.5  C) Temp src: Oral BP: (!) 141/83 Pulse: 79   Resp: 16 SpO2: 96 % O2 Device: None (Room air)    Vitals:    05/17/21 1029 05/17/21 1500   Weight: 139 kg (306 lb 7 oz) 141.5 kg (312 lb)     Vital Signs with Ranges  Temp:  [97.7  F (36.5  C)] 97.7  F (36.5  C)  Pulse:  [70-79] 79  Resp:  [16] 16  BP: (136-141)/(75-83) 141/83  SpO2:  [95 %-96 %] 96 %  I/O last 3 completed shifts:  In: 990 [P.O.:990]  Out: 2210 [Urine:2210]    Constitutional: Alert, oriented to person, place, date, situation.  Cooperative, lying in bed in NAD.   Respiratory:  Lungs CTAB.  No crackles, wheezes, or rhonchi, no labored breathing.  Cardiovascular:  Heart RRR, no murmurs, no edema.  GI:  Abdomen soft, NT/ND and with normoactive BS  Skin/Integumen:   Warm, dry, non-diaphoretic.  MSK: Moves all 4 extremities equally.   strength equal bilaterally.  Neuro: CN II-XII grossly intact, coordination normal, speech normal, no facial droop.  No obvious focal deficits.    Discharge Disposition   Discharged to home  Condition at discharge: Stable    Consultations This Hospital Stay   SWALLOW EVAL SPEECH PATH AT BEDSIDE IP CONSULT  PHYSICAL THERAPY ADULT IP CONSULT  OCCUPATIONAL THERAPY ADULT IP CONSULT  NEUROLOGY IP CONSULT  CARE MANAGEMENT / SOCIAL WORK IP CONSULT  PHARMACY TO DOSE WARFARIN  PHARMACY TO DOSE WARFARIN  PHARMACY TO DOSE WARFARIN    Time Spent on this Encounter   I, ALISSON Barron, personally saw the patient today and spent greater than 30 minutes discharging this patient.    Discharge Orders      NEUROLOGY ADULT REFERRAL      Home care nursing referral      Reason for your hospital stay    Acute CVA in the setting of recent CVAs     Follow-up and recommended labs and tests     Follow up with primary care provider within 7 days for hospital follow- up.  Recommend to consider an outpatient Hematology referral for further evaluation of repeat strokes and variable INR levels.     Activity    Your activity upon discharge: activity as tolerated     Diet    Follow this diet upon discharge: Regular     Discharge Medications   Discharge Medication List as of 5/19/2021  4:42 PM      CONTINUE these medications which have CHANGED    Details   atorvastatin (LIPITOR) 80 MG tablet Take 1 tablet (80 mg) by mouth every evening, Disp-30 tablet, R-0, E-Prescribe         CONTINUE these medications which have NOT CHANGED    Details   acetaminophen (TYLENOL) 500 MG tablet Take 1-2 tablets (500-1,000 mg) by mouth every 8 hours as needed for mild pain, Transitional      aspirin (ASA) 81 MG EC tablet Take 1 tablet (81 mg) by mouth daily, Disp-90 tablet, R-1, E-Prescribe      azelastine (ASTELIN) 0.1 % nasal spray USE 2 SPRAYS IN NOSTRIL 2 TIMES DAILY AS NEEDED.,  Disp-30 mL, R-11, E-Prescribe      cetirizine (ZYRTEC) 10 MG tablet Take 10 mg by mouth every morning, Historical      cyclobenzaprine (FLEXERIL) 10 MG tablet TAKE ONE TABLET BY MOUTH THREE TIMES DAILY AS NEEDED FOR MUSCLE SPASM., Disp-270 tablet, R-0, E-Prescribe      diazepam (VALIUM) 5 MG tablet TAKE ONE TABLET BY MOUTH EVERY EIGHT HOURS AS NEEDED FOR ANXIETY OR MUSCLE SPASM, Disp-60 tablet, R-0, E-Prescribe      donepezil (ARICEPT) 10 MG tablet Take 2 tablets (20 mg) by mouth At Bedtime Stop if diarrhea or nausea develop, Disp-180 tablet, R-1, E-Prescribe      folic acid (FOLVITE) 1 MG tablet Take 5 tablets (5 mg) by mouth daily, Disp-150 tablet, R-3, E-Prescribe      furosemide (LASIX) 20 MG tablet Take 1-2 tablets (20-40 mg) by mouth daily as needed (leg swelling), Disp-60 tablet, R-0, E-Prescribe      guanFACINE (TENEX) 1 MG tablet Take 1 tablet (1 mg) by mouth At Bedtime, Disp-90 tablet, R-0, E-Prescribe      lamoTRIgine (LAMICTAL) 25 MG tablet Take 1 tablet (25 mg) by mouth daily, Disp-90 tablet, R-1, E-Prescribe      levothyroxine (SYNTHROID/LEVOTHROID) 150 MCG tablet Take 1 tablet (150 mcg) by mouth daily, Disp-90 tablet, R-3, E-Prescribe      liothyronine (CYTOMEL) 25 MCG tablet Take 1 tablet (25 mcg) by mouth daily, Disp-30 tablet, R-3, E-Prescribe      memantine (NAMENDA) 10 MG tablet Take 1 tablet (10 mg) by mouth 2 times daily, Disp-180 tablet, R-0, E-Prescribe      mirabegron (MYRBETRIQ) 50 MG 24 hr tablet Take 1 tablet (50 mg) by mouth daily, Disp-90 tablet, R-3, E-Prescribe      potassium chloride ER (K-TAB) 20 MEQ CR tablet Take 1 tablet (20 mEq) by mouth daily as needed (Take one tablet for each furosemide tablet that is taken), Disp-60 tablet, R-0, E-Prescribe      pregabalin (LYRICA) 100 MG capsule Take 1 capsule (100 mg) by mouth 3 times daily Do not take if sleepy/sedated., Disp-270 capsule, R-0, E-Prescribe      propafenone (RYTHMOL) 150 MG TABS tablet Take 1 tablet (150 mg) by mouth every  8 hours, Disp-270 tablet, R-0, E-Prescribe      senna-docusate (SENOKOT-S;PERICOLACE) 8.6-50 MG per tablet Take 1 tablet by mouth 2 times daily as needed for constipation, Disp-60 tablet, R-11, E-Prescribe      venlafaxine (EFFEXOR-XR) 150 MG 24 hr capsule Take 2 capsules (300 mg) by mouth daily, Disp-180 capsule, R-3, E-PrescribeAppeal was approved.      !! warfarin ANTICOAGULANT (COUMADIN) 2.5 MG tablet Take 7.5 mg by mouth three times a week Mon, Tues, Thurs, Historical      !! warfarin ANTICOAGULANT (COUMADIN) 2.5 MG tablet Take 5 mg by mouth four times a week Wed, Fri, Sat & Sun, Historical       !! - Potential duplicate medications found. Please discuss with provider.        Allergies   Allergies   Allergen Reactions     Gabapentin      Severe behavioral disturbances     Data   Most Recent 3 CBC's:  Recent Labs   Lab Test 05/19/21  0623 05/18/21  0610 05/17/21  1026   WBC 6.6 5.7 6.3   HGB 13.4 13.5 13.8   MCV 89 90 90    227 235      Most Recent 3 BMP's:  Recent Labs   Lab Test 05/19/21  0623 05/18/21  0610 05/17/21  1026    140 142   POTASSIUM 3.7 3.7 3.6   CHLORIDE 109 106 107   CO2 29 32 34*   BUN 15 14 16   CR 0.94 1.00 1.14   ANIONGAP 3 2* 1*   CATERINA 8.7 8.7 8.7   GLC 89 87 118*     Most Recent 2 LFT's:  Recent Labs   Lab Test 04/21/21  0845 01/22/21  1105   AST 21 25   ALT 31 24   ALKPHOS 86 106   BILITOTAL 1.1 0.9     Most Recent INR's and Anticoagulation Dosing History:  Anticoagulation Dose History     Recent Dosing and Labs Latest Ref Rng & Units 5/3/2021 5/7/2021 5/10/2021 5/14/2021 5/17/2021 5/18/2021 5/19/2021    ZZ IMS TEMPLATE - - - - - 7.5 mg 7.5 mg -    INR 0.86 - 1.14 3.8(A) 3.5(A) 2.4(A) 3.3(A) 2.87(H) 3.04(H) 3.06(H)    INR 0.86 - 1.14 - - - - - - -        Most Recent 3 Troponin's:  Recent Labs   Lab Test 05/17/21  1026 04/19/21  0305 04/18/21  1409   TROPI <0.015 <0.015 <0.015     Most Recent Cholesterol Panel:  Recent Labs   Lab Test 04/18/21  1759   CHOL 254*   *   HDL  43   TRIG 233*     Most Recent 6 Bacteria Isolates From Any Culture (See EPIC Reports for Culture Details):  Recent Labs   Lab Test 01/22/21  1201 01/22/21  1107 01/10/21  0832 01/09/21  1502 01/09/21  1304 02/09/18  1330   CULT No growth No growth Light growth  Mixed aerobic and anaerobic esther  *  No predominant anaerobes  Moderate growth  Escherichia coli  *  Moderate growth  Citrobacter koseri  *  Light growth  Enterococcus faecalis  * No growth No growth No beta hemolytic Streptococcus Group A isolated     Most Recent TSH, T4 and A1c Labs:  Recent Labs   Lab Test 01/23/21  0747 11/30/20  1841 10/29/19  1024   TSH  --  2.17 6.00*   T4  --   --  0.87   A1C 4.9  --   --      Results for orders placed or performed during the hospital encounter of 05/17/21   CT Head w/o Contrast    Narrative    CT SCAN OF THE HEAD WITHOUT CONTRAST   5/17/2021 10:41 AM     HISTORY: Neuro deficit, acute, stroke suspected. Left-sided numbness.  Recent stroke.    TECHNIQUE:  Axial images of the head and coronal reformations without  IV contrast material.  Radiation dose for this scan was reduced using  automated exposure control, adjustment of the mA and/or kV according  to patient size, or iterative reconstruction technique.    COMPARISON: 4/18/2021    FINDINGS: There is an old right cerebellar infarct and old right  lateral occipital infarct. Mild cerebral atrophy is present. The brain  parenchyma is otherwise normal. There is no evidence for intracranial  hemorrhage, mass effect, acute infarct, or skull fracture. Visualized  paranasal sinuses and mastoid air cells are clear.      Impression    IMPRESSION: Chronic changes. No evidence for intracranial hemorrhage  or any acute process.    CONCHITA SNOW MD   CTA Head Neck with Contrast    Narrative    CTA  HEAD/NECK WITH CONTRAST May 17, 2021 10:41 AM     HISTORY: Neuro deficit, acute, stroke suspected. Sudden onset of  numbness in lips, tingling, and left leg weakness.    TECHNIQUE:  Multiplanar multisequence images were obtained through the  head and neck with intravenous contrast.    COMPARISON: 4/18/2021.    FINDINGS:    Brachiocephalic vessels: There is some mild atherosclerotic disease  involving the thoracic arch and some proximal brachiocephalic vessels  without stenosis. There is direct origin of the left vertebral artery  off the arch which is an anatomic variant.    Right carotid system: Mild calcific plaque is present in the proximal  internal carotid artery without stenosis. There is no evidence for  dissection.    Left carotid system: There is some mild calcific plaque in the carotid  bifurcation. There is no evidence for stenosis or dissection.    Right vertebral artery: There is some mild plaque at the origin and  distally but no stenosis or dissection is present.    Left vertebral artery: There is some mild calcific plaque at the  origin but there is no stenosis or dissection.    [place]: The basilar artery is patent. The distal internal  carotid arteries are also patent. The proximal anterior, middle, and  posterior cerebral arteries are normal in appearance. There is no  evidence for aneurysm or thromboembolism.      Impression    IMPRESSION:  1. Mild atherosclerotic disease involving both carotid bifurcations in  the proximal brachiocephalic vessels without stenosis.  2. No evidence for significant stenosis, dissection, thromboembolism,  or aneurysm.    CONCHITA SNOW MD   MR Brain w/o & w Contrast    Narrative    MRI BRAIN WITHOUT AND WITH CONTRAST  5/17/2021 8:10 PM     HISTORY:  Neurologic deficit, acute, stroke suspected.    TECHNIQUE:  Multiplanar, multisequence MRI of the brain without and  with 14 mL Gadavist.    COMPARISON: Head CT from earlier the same day. Brain MR 4/18/2021.     FINDINGS: New 0.6 cm area of restricted diffusion in the right  thalamus concerning for an acute infarct. Mild associated T2  hyperintensity in this area. No other regions of  restricted diffusion.  No significant mass effect or midline shift. No evidence of acute  intracranial hemorrhage. Mild diffuse parenchymal volume loss.  Chronic-appearing cortical infarct in the right occipital lobe with  evidence of cortical laminar necrosis. Several additional small  cortical infarcts that are similar to prior. Patchy periventricular  white matter T2 hyperdensities are nonspecific, but likely related to  chronic microvascular ischemic disease.    The facial structures appear normal.       Impression    IMPRESSION:    1. New subcentimeter infarct in the right thalamus that was not  present on 4/18/2021. No evidence of hemorrhagic transformation of  infarct. No significant mass effect or midline shift.  2. Chronic-appearing cortical infarct in the right occipital lobe with  cortical laminar necrosis. Several additional smaller cortical  infarcts are present and not significantly changed since prior.  3. Nonspecific white matter changes likely due to chronic  microvascular ischemic disease. Mild diffuse parenchymal volume loss.    LUIS WINTERS MD   CT Chest/Abdomen/Pelvis w Contrast    Narrative    CT CHEST/ABDOMEN/PELVIS WITH CONTRAST 5/18/2021 11:18 AM    CLINICAL HISTORY: Recurrent stroke, concern for occult malignancy.    TECHNIQUE: CT scan of the chest, abdomen, and pelvis was performed  following injection of IV contrast. Multiplanar reformats were  obtained. Dose reduction techniques were used.   CONTRAST: 135 mL Isovue-[id number]    COMPARISON: None.    FINDINGS:   LUNGS AND PLEURA: The lungs are clear. No pleural effusion.    MEDIASTINUM/AXILLAE: Postoperative changes with prosthetic aortic  valve and ascending aortic graft. Severe coronary artery  calcification.    HEPATOBILIARY: Cholecystectomy. No liver lesions.    PANCREAS: Normal.    SPLEEN: Normal.    ADRENAL GLANDS: Normal.    KIDNEYS/BLADDER: Normal.    BOWEL: No visible bowel mass. No obstruction or inflammatory changes.    PELVIC  ORGANS: Normal.    ADDITIONAL FINDINGS: None.    MUSCULOSKELETAL: Fat-containing right inguinal hernia. Bilateral hip  arthroplasties. Degenerative changes throughout the spine.  Decompression and fusion at L2-L3.      Impression    IMPRESSION:  1.  No evidence of malignancy in the chest, abdomen, or pelvis.  2.  Postoperative changes with prosthetic aortic valve and ascending  aortic graft.    ZULY CULLEN MD     *Note: Due to a large number of results and/or encounters for the requested time period, some results have not been displayed. A complete set of results can be found in Results Review.

## 2021-05-19 NOTE — TELEPHONE ENCOUNTER
Discharge summary not complete.  Will review Tomorrow to see if notes completed.  Clare Samuels, SHYANN  Anticoagulation Nurse - [facility]

## 2021-05-19 NOTE — PROGRESS NOTES
Contact precaution for MRSA. A&OX4. IND with urinal at BS. O2=RA. Diet=regular, tolerating well. Denies pain. Pt reports diminishing numbness of lips. IV=Sl in L ac. TELE=SR. Neuro following.  Plan for discharge tomorrow home with  Homecare.

## 2021-05-19 NOTE — PLAN OF CARE
Pt is discharging home at this time w/ all pt's belongings. AVS and education given. Questions answered. Pt's Son-in-law will transport.

## 2021-05-19 NOTE — PLAN OF CARE
Pt is A&OX4,VSS on RA, denies any pain.Neuros intact except mild lips numbness, UP with SBA, can be unsteady at times when up,sing urinal a- SBA.Tegular diet, PIV is s/l. Neuro following.Plan is to discharge home with  home care.

## 2021-05-20 ENCOUNTER — TELEPHONE (OUTPATIENT)
Dept: INTERNAL MEDICINE | Facility: CLINIC | Age: 66
End: 2021-05-20

## 2021-05-20 ENCOUNTER — PATIENT OUTREACH (OUTPATIENT)
Dept: CARE COORDINATION | Facility: CLINIC | Age: 66
End: 2021-05-20

## 2021-05-20 DIAGNOSIS — Z71.89 OTHER SPECIFIED COUNSELING: ICD-10-CM

## 2021-05-20 DIAGNOSIS — Z53.9 DIAGNOSIS NOT YET DEFINED: Primary | ICD-10-CM

## 2021-05-20 PROCEDURE — G0179 MD RECERTIFICATION HHA PT: HCPCS | Performed by: INTERNAL MEDICINE

## 2021-05-20 NOTE — TELEPHONE ENCOUNTER
ANTICOAGULATION  MANAGEMENT: Discharge Review    Todd S Aschoff chart reviewed for anticoagulation continuity of care    Hospital Admission on 5/17-5/19 for left sided numbness and tingling.    --Continue daily aspirin, consider addition of Plavix per neurology  --Continue warfarin, with INR goal 2.5-3.5  --Brain MRI shows new subcentimeter infarct in the right thalamus that was not present on 4/18/2021.  No evidence of hemorrhage, mass-effect or midline shift.  Mechanical Aortic Valve  --Continue warfarin.  INR goal 2.5 to 3.5.  INR 3.0 on day of discharge    Discharge disposition: Home with Home Care    Results:    Recent labs: (last 7 days)     05/14/21 05/17/21  1048 05/18/21  0610 05/19/21  0623   INR 3.3* 2.87* 3.04* 3.06*     Anticoagulation inpatient management:     home regimen continued    Anticoagulation discharge instructions:     Warfarin dosing: home regimen continued   Bridging: No   INR goal change: No      Medication changes affecting anticoagulation: No    Additional factors affecting anticoagulation: Yes: Increase in Atorvastatin to 80 mg    Plan     No adjustment to anticoagulation plan needed    Patient not contacted    Anticoagulation Calendar updated    Clare Samuels, RN

## 2021-05-20 NOTE — PROGRESS NOTES
Clinic Care Coordination Contact  [facility]/Voicemail       Clinical Data: Care Coordinator Outreach  Outreach attempted x 1.  Left message on Lissy voicemail with call back information and requested return call.  Plan:  Care Coordinator will try to reach patient again in 1-2 business days.

## 2021-05-20 NOTE — LETTER
[facility] CARE COORDINATION  [address]  [facility] [address]    May 21, 2021    Todd S Aschoff  [address]  [place] [address]      Dear Nicko,    I am a clinic community health worker who works with Obdulio Ingram MD, MD at  [facility]. I have been trying to reach you recently to introduce Clinic Care Coordination and to see if there was anything I could assist you with.  Below is a description of clinic care coordination and how I can further assist you.      The clinic care coordination team is made up of a registered nurse,  and community health worker who understand the health care system. The goal of clinic care coordination is to help you manage your health and improve access to the health care system in the most efficient manner. The team can assist you in meeting your health care goals by providing education, coordinating services, strengthening the communication among your providers and supporting you with any resource needs.    Please feel free to contact the Community Health Worker at [phone] with any questions or concerns. We are focused on providing you with the highest-quality healthcare experience possible and that all starts with you.     Sincerely,   Namita- Community Health Worker

## 2021-05-21 ENCOUNTER — ANTICOAGULATION THERAPY VISIT (OUTPATIENT)
Dept: INTERNAL MEDICINE | Facility: CLINIC | Age: 66
End: 2021-05-21

## 2021-05-21 ENCOUNTER — TELEPHONE (OUTPATIENT)
Dept: INTERNAL MEDICINE | Facility: CLINIC | Age: 66
End: 2021-05-21

## 2021-05-21 DIAGNOSIS — Z79.01 LONG TERM CURRENT USE OF ANTICOAGULANT THERAPY: ICD-10-CM

## 2021-05-21 DIAGNOSIS — I48.20 CHRONIC ATRIAL FIBRILLATION (H): ICD-10-CM

## 2021-05-21 DIAGNOSIS — Z95.2 AORTIC VALVE PROSTHESIS PRESENT: ICD-10-CM

## 2021-05-21 DIAGNOSIS — I48.0 PAROXYSMAL ATRIAL FIBRILLATION (H): ICD-10-CM

## 2021-05-21 LAB — INR PPP: 2.9 (ref 0.9–1.1)

## 2021-05-21 NOTE — PROGRESS NOTES
ANTICOAGULATION MANAGEMENT     Patient Name:  Todd S Aschoff  Date:  2021    ASSESSMENT /SUBJECTIVE:    Today's INR result of 2.9 is therapeutic. Goal INR of 2.0-3.0      Warfarin dose taken: Warfarin taken as instructed    Diet: No new diet changes affecting INR    Medication changes/ interactions: No new medications/supplements affecting INR    Previous INR: Therapeutic     S/S of bleeding or thromboembolism: No    New injury or illness: No    Upcoming surgery, procedure or cardioversion: No    Additional findings: Patient had a stroke on , was referred to hematology as patient has had multiple strokes when therapeutic with warfarin.      PLAN:    Telephone call with home care nurse Vanessa regarding INR result and instructed:     Warfarin Dosing Instructions: Continue your current warfarin dose 5 mg on sun/wed/fri and 7.5 mg all other days    Instructed patient to follow up no later than: 1 week  Orders given to  Homecare nurse/facility to recheck    Education provided: None required      Vanessa verbalizes understanding and agrees to warfarin dosing plan.    Instructed to call the Anticoagulation Clinic for any changes, questions or concerns. (#[phone])        Britta Snowden RN      OBJECTIVE:  Recent labs: (last 7 days)     21  1048 21  0610 21  0623 21   INR 2.87* 3.04* 3.06* 2.9*         No question data found.  Anticoagulation Summary  As of 2021    INR goal:  2.5-3.5   TTR:  42.4 % (10.1 mo)   INR used for dosin.9 (2021)   Warfarin maintenance plan:  5 mg (5 mg x 1) every Sun, Wed, Fri; 7.5 mg (5 mg x 1.5) all other days   Full warfarin instructions:  5 mg every Sun, Wed, Fri; 7.5 mg all other days   Weekly warfarin total:  45 mg   No change documented:  Linda Tang RN   Plan last modified:  Linda Tang RN (5/10/2021)   Next INR check:  2021   Priority:  Maintenance   Target end date:  Indefinite    Indications    Aortic valve prosthesis present  [Z95.2]  Atrial fibrillation (H) [I48.91]  Long term current use of anticoagulant therapy [Z79.01]  Chronic atrial fibrillation (H) [I48.20]             Anticoagulation Episode Summary     INR check location:      Preferred lab:  EXTERNAL LAB    Send INR reminders to:  AIDA MCWILLIAMS    Comments:  5mg tabs / CALENDAR / needs bridging. may leave DVM or speak with Paul, per signed consent // Home care      Anticoagulation Care Providers     Provider Role Specialty Phone number    Obdulio Ingram MD Referring Internal Medicine [phone]

## 2021-05-21 NOTE — PROGRESS NOTES
Clinic Care Coordination Contact  [facility]/Voicemail       Clinical Data: Care Coordinator Outreach  Outreach attempted x 2.  Left message on patient's voicemail with call back information and requested return call.  Plan: Care Coordinator will send care coordination introduction letter with care coordinator contact information and explanation of care coordination services via [employer]. Care Coordinator will do no further outreaches at this time.

## 2021-05-21 NOTE — TELEPHONE ENCOUNTER
[facility] calling for orders. SKILLED NURSING 2/week for 7 weeks.    Vanessa [phone] and ok leave message

## 2021-05-25 DIAGNOSIS — M62.81 GENERALIZED MUSCLE WEAKNESS: ICD-10-CM

## 2021-05-25 DIAGNOSIS — R41.3 MEMORY DIFFICULTIES: ICD-10-CM

## 2021-05-25 NOTE — TELEPHONE ENCOUNTER
Routing refill request to provider for review/approval because:  Failed protocol, has appt tomorrow

## 2021-05-26 ENCOUNTER — VIRTUAL VISIT (OUTPATIENT)
Dept: INTERNAL MEDICINE | Facility: CLINIC | Age: 66
End: 2021-05-26
Payer: COMMERCIAL

## 2021-05-26 ENCOUNTER — RECORDS - HEALTHEAST (OUTPATIENT)
Dept: ADMINISTRATIVE | Facility: CLINIC | Age: 66
End: 2021-05-26

## 2021-05-26 DIAGNOSIS — I63.9 CEREBROVASCULAR ACCIDENT (CVA), UNSPECIFIED MECHANISM (H): Primary | ICD-10-CM

## 2021-05-26 PROCEDURE — 99207 PR NO BILLABLE SERVICE THIS VISIT: CPT | Performed by: INTERNAL MEDICINE

## 2021-05-26 NOTE — PROGRESS NOTES
Nicko is a 65 year old who is being evaluated via a billable video visit.      PATIENT NOT ABLE TO BE REACHED BY PROVIDER.    How would you like to obtain your AVS? MyChart  If the video visit is dropped, the invitation should be resent by: Text to cell phone: [phone]  Will anyone else be joining your video visit? No      Video Start Time:       Subjective   Nicko is a 65 year old who presents for the following health issues : Hospital follow up    HPI       Hospital Follow-up Visit:    Hospital/Nursing Home/IP Rehab Facility: [facility]  Date of Admission: 5/17/2021  Date of Discharge: 5/19/2021  Reason(s) for Admission: Acute CVA      Was your hospitalization related to COVID-19? No   Problems taking medications regularly:  None  Medication changes since discharge: None  Problems adhering to non-medication therapy:  None    Summary of hospitalization:  [facility] discharge summary reviewed         Objective           Vitals:  No vitals were obtained today due to virtual visit.    Physical Exam           Video-Visit Details    Type of service:  Video Visit    Video End Time N/A

## 2021-05-27 ENCOUNTER — RECORDS - HEALTHEAST (OUTPATIENT)
Dept: ADMINISTRATIVE | Facility: CLINIC | Age: 66
End: 2021-05-27

## 2021-05-27 VITALS
HEART RATE: 77 BPM | DIASTOLIC BLOOD PRESSURE: 80 MMHG | OXYGEN SATURATION: 96 % | TEMPERATURE: 97 F | SYSTOLIC BLOOD PRESSURE: 143 MMHG | RESPIRATION RATE: 16 BRPM

## 2021-05-28 ENCOUNTER — ANTICOAGULATION THERAPY VISIT (OUTPATIENT)
Dept: INTERNAL MEDICINE | Facility: CLINIC | Age: 66
End: 2021-05-28

## 2021-05-28 ENCOUNTER — TELEPHONE (OUTPATIENT)
Dept: INTERNAL MEDICINE | Facility: CLINIC | Age: 66
End: 2021-05-28

## 2021-05-28 DIAGNOSIS — I48.91 ATRIAL FIBRILLATION (H): ICD-10-CM

## 2021-05-28 DIAGNOSIS — Z79.01 LONG TERM CURRENT USE OF ANTICOAGULANT THERAPY: ICD-10-CM

## 2021-05-28 DIAGNOSIS — Z95.2 AORTIC VALVE PROSTHESIS PRESENT: ICD-10-CM

## 2021-05-28 DIAGNOSIS — I48.20 CHRONIC ATRIAL FIBRILLATION (H): ICD-10-CM

## 2021-05-28 LAB — INR PPP: 3.3 (ref 0.9–1.1)

## 2021-05-28 NOTE — TELEPHONE ENCOUNTER
homecare / PHYSICAL THERAPY discharge orders received via fax. Form in your mailbox to be signed.

## 2021-05-28 NOTE — PROGRESS NOTES
ANTICOAGULATION MANAGEMENT     Patient Name:  Todd S Aschoff  Date:  5/28/2021    ASSESSMENT /SUBJECTIVE:    Today's INR result of 3.3 is therapeutic. Goal INR of 2.5-3.5      Warfarin dose taken: Warfarin taken as instructed    Diet: No new diet changes affecting INR    Medication changes/ interactions: No new medications/supplements affecting INR    Previous INR: Therapeutic     S/S of bleeding or thromboembolism: No    New injury or illness: No    Upcoming surgery, procedure or cardioversion: No    Additional findings: None      PLAN:    Telephone call with home care nurse Vanessa regarding INR result and instructed:     Warfarin Dosing Instructions: Continue your current warfarin dose 5 mg Sun W F; 7.5 mg ROW    Instructed patient to follow up no later than: 1 week  Orders given to  Homecare nurse/facility to recheck    Education provided: None required      Vanessa verbalizes understanding and agrees to warfarin dosing plan.    Instructed to call the Anticoagulation Clinic for any changes, questions or concerns. (#[phone])        Kenya Davis RN      OBJECTIVE:  Recent labs: (last 7 days)     05/28/21   INR 3.3*         INR assessment THER    Recheck INR In: 1 WEEK    INR Location Homecare INR      Anticoagulation Summary  As of 5/28/2021    INR goal:  2.5-3.5   TTR:  42.4 % (10.1 mo)   INR used for dosing:  3.3 (5/28/2021)   Warfarin maintenance plan:  5 mg (5 mg x 1) every Sun, Wed, Fri; 7.5 mg (5 mg x 1.5) all other days   Full warfarin instructions:  5 mg every Sun, Wed, Fri; 7.5 mg all other days   Weekly warfarin total:  45 mg   No change documented:  Cecy Nice RN   Plan last modified:  Linda Tang, RN (5/10/2021)   Next INR check:  6/4/2021   Priority:  Maintenance   Target end date:  Indefinite    Indications    Aortic valve prosthesis present [Z95.2]  Atrial fibrillation (H) [I48.91]  Long term current use of anticoagulant therapy [Z79.01]  Chronic atrial fibrillation (H)  [I48.20]             Anticoagulation Episode Summary     INR check location:      Preferred lab:  EXTERNAL LAB    Send INR reminders to:  AIDA MCWILLIAMS    Comments:  5mg tabs / CALENDAR / needs bridging. may leave DVM or speak with Paul, per signed consent // Home care      Anticoagulation Care Providers     Provider Role Specialty Phone number    Obdulio Ingram MD Referring Internal Medicine [phone]

## 2021-05-30 RX ORDER — LAMOTRIGINE 25 MG/1
25 TABLET ORAL DAILY
Qty: 90 TABLET | Refills: 0 | Status: ON HOLD | OUTPATIENT
Start: 2021-05-30 | End: 2021-06-04

## 2021-05-30 RX ORDER — DONEPEZIL HYDROCHLORIDE 10 MG/1
TABLET, FILM COATED ORAL
Qty: 180 TABLET | Refills: 0 | Status: SHIPPED | OUTPATIENT
Start: 2021-05-30 | End: 2021-06-18

## 2021-06-01 ENCOUNTER — TELEPHONE (OUTPATIENT)
Dept: INTERNAL MEDICINE | Facility: CLINIC | Age: 66
End: 2021-06-01

## 2021-06-01 NOTE — TELEPHONE ENCOUNTER
Vanessa calling from [facility] calling to report a fall and injury to the head. Patient was advised to go to the ED but he refused. Ok to call and lm [phone]

## 2021-06-02 ENCOUNTER — TELEPHONE (OUTPATIENT)
Dept: INTERNAL MEDICINE | Facility: CLINIC | Age: 66
End: 2021-06-02

## 2021-06-03 ENCOUNTER — HOSPITAL ENCOUNTER (OUTPATIENT)
Facility: CLINIC | Age: 66
Setting detail: OBSERVATION
Discharge: HOME | End: 2021-06-04
Attending: EMERGENCY MEDICINE | Admitting: INTERNAL MEDICINE
Payer: COMMERCIAL

## 2021-06-03 ENCOUNTER — APPOINTMENT (OUTPATIENT)
Dept: CT IMAGING | Facility: CLINIC | Age: 66
End: 2021-06-03
Attending: EMERGENCY MEDICINE
Payer: COMMERCIAL

## 2021-06-03 ENCOUNTER — APPOINTMENT (OUTPATIENT)
Dept: MRI IMAGING | Facility: CLINIC | Age: 66
End: 2021-06-03
Attending: PHYSICIAN ASSISTANT
Payer: COMMERCIAL

## 2021-06-03 DIAGNOSIS — M62.81 GENERALIZED MUSCLE WEAKNESS: ICD-10-CM

## 2021-06-03 DIAGNOSIS — R56.9 SEIZURES (H): Primary | ICD-10-CM

## 2021-06-03 DIAGNOSIS — I63.9 CEREBROVASCULAR ACCIDENT (CVA), UNSPECIFIED MECHANISM (H): ICD-10-CM

## 2021-06-03 LAB
ANION GAP SERPL CALCULATED.3IONS-SCNC: <1 MMOL/L (ref 3–14)
APTT PPP: 45 SEC (ref 22–37)
BASOPHILS # BLD AUTO: 0 10E9/L (ref 0–0.2)
BASOPHILS NFR BLD AUTO: 0.7 %
BUN SERPL-MCNC: 20 MG/DL (ref 7–30)
CALCIUM SERPL-MCNC: 8.5 MG/DL (ref 8.5–10.1)
CHLORIDE SERPL-SCNC: 109 MMOL/L (ref 94–109)
CO2 SERPL-SCNC: 33 MMOL/L (ref 20–32)
CREAT SERPL-MCNC: 1.06 MG/DL (ref 0.66–1.25)
DIFFERENTIAL METHOD BLD: NORMAL
EOSINOPHIL # BLD AUTO: 0.2 10E9/L (ref 0–0.7)
EOSINOPHIL NFR BLD AUTO: 4 %
ERYTHROCYTE [DISTWIDTH] IN BLOOD BY AUTOMATED COUNT: 14.8 % (ref 10–15)
GFR SERPL CREATININE-BSD FRML MDRD: 74 ML/MIN/{1.73_M2}
GLUCOSE SERPL-MCNC: 89 MG/DL (ref 70–99)
HCT VFR BLD AUTO: 43.4 % (ref 40–53)
HGB BLD-MCNC: 13.9 G/DL (ref 13.3–17.7)
IMM GRANULOCYTES # BLD: 0 10E9/L (ref 0–0.4)
IMM GRANULOCYTES NFR BLD: 0.2 %
INR PPP: 3.82 (ref 0.86–1.14)
INTERPRETATION ECG - MUSE: NORMAL
LABORATORY COMMENT REPORT: NORMAL
LYMPHOCYTES # BLD AUTO: 2 10E9/L (ref 0.8–5.3)
LYMPHOCYTES NFR BLD AUTO: 33.3 %
MCH RBC QN AUTO: 29.1 PG (ref 26.5–33)
MCHC RBC AUTO-ENTMCNC: 32 G/DL (ref 31.5–36.5)
MCV RBC AUTO: 91 FL (ref 78–100)
MONOCYTES # BLD AUTO: 0.9 10E9/L (ref 0–1.3)
MONOCYTES NFR BLD AUTO: 15.5 %
NEUTROPHILS # BLD AUTO: 2.8 10E9/L (ref 1.6–8.3)
NEUTROPHILS NFR BLD AUTO: 46.3 %
NRBC # BLD AUTO: 0 10*3/UL
NRBC BLD AUTO-RTO: 0 /100
PLATELET # BLD AUTO: 249 10E9/L (ref 150–450)
POTASSIUM SERPL-SCNC: 4.2 MMOL/L (ref 3.4–5.3)
POTASSIUM SERPL-SCNC: 4.3 MMOL/L (ref 3.4–5.3)
RBC # BLD AUTO: 4.77 10E12/L (ref 4.4–5.9)
SARS-COV-2 RNA RESP QL NAA+PROBE: NEGATIVE
SODIUM SERPL-SCNC: 142 MMOL/L (ref 133–144)
SPECIMEN SOURCE: NORMAL
TROPONIN I SERPL-MCNC: <0.015 UG/L (ref 0–0.04)
TROPONIN I SERPL-MCNC: <0.015 UG/L (ref 0–0.04)
WBC # BLD AUTO: 6 10E9/L (ref 4–11)

## 2021-06-03 PROCEDURE — 70450 CT HEAD/BRAIN W/O DYE: CPT

## 2021-06-03 PROCEDURE — 99291 CRITICAL CARE FIRST HOUR: CPT | Mod: 25 | Performed by: INTERNAL MEDICINE

## 2021-06-03 PROCEDURE — 84484 ASSAY OF TROPONIN QUANT: CPT | Performed by: EMERGENCY MEDICINE

## 2021-06-03 PROCEDURE — 85260 CLOT FACTOR X STUART-POWER: CPT | Performed by: EMERGENCY MEDICINE

## 2021-06-03 PROCEDURE — A9585 GADOBUTROL INJECTION: HCPCS | Performed by: INTERNAL MEDICINE

## 2021-06-03 PROCEDURE — 85025 COMPLETE CBC W/AUTO DIFF WBC: CPT | Performed by: EMERGENCY MEDICINE

## 2021-06-03 PROCEDURE — 87635 SARS-COV-2 COVID-19 AMP PRB: CPT | Performed by: EMERGENCY MEDICINE

## 2021-06-03 PROCEDURE — 250N000013 HC RX MED GY IP 250 OP 250 PS 637: Performed by: INTERNAL MEDICINE

## 2021-06-03 PROCEDURE — G0378 HOSPITAL OBSERVATION PER HR: HCPCS

## 2021-06-03 PROCEDURE — 70553 MRI BRAIN STEM W/O & W/DYE: CPT

## 2021-06-03 PROCEDURE — 84484 ASSAY OF TROPONIN QUANT: CPT | Performed by: PHYSICIAN ASSISTANT

## 2021-06-03 PROCEDURE — 99220 PR INITIAL OBSERVATION CARE,LEVEL III: CPT | Performed by: INTERNAL MEDICINE

## 2021-06-03 PROCEDURE — 84132 ASSAY OF SERUM POTASSIUM: CPT | Performed by: PHYSICIAN ASSISTANT

## 2021-06-03 PROCEDURE — 70496 CT ANGIOGRAPHY HEAD: CPT

## 2021-06-03 PROCEDURE — 85610 PROTHROMBIN TIME: CPT | Performed by: EMERGENCY MEDICINE

## 2021-06-03 PROCEDURE — 999N000226 HC STATISTIC SLP IP EVAL DEFER: Performed by: SPEECH-LANGUAGE PATHOLOGIST

## 2021-06-03 PROCEDURE — 85730 THROMBOPLASTIN TIME PARTIAL: CPT | Performed by: EMERGENCY MEDICINE

## 2021-06-03 PROCEDURE — 250N000013 HC RX MED GY IP 250 OP 250 PS 637: Performed by: PHYSICIAN ASSISTANT

## 2021-06-03 PROCEDURE — 255N000002 HC RX 255 OP 636: Performed by: INTERNAL MEDICINE

## 2021-06-03 PROCEDURE — 250N000009 HC RX 250: Performed by: EMERGENCY MEDICINE

## 2021-06-03 PROCEDURE — C9803 HOPD COVID-19 SPEC COLLECT: HCPCS

## 2021-06-03 PROCEDURE — 80048 BASIC METABOLIC PNL TOTAL CA: CPT | Performed by: EMERGENCY MEDICINE

## 2021-06-03 PROCEDURE — 250N000011 HC RX IP 250 OP 636: Performed by: EMERGENCY MEDICINE

## 2021-06-03 PROCEDURE — 36415 COLL VENOUS BLD VENIPUNCTURE: CPT | Performed by: PHYSICIAN ASSISTANT

## 2021-06-03 RX ORDER — ATORVASTATIN CALCIUM 80 MG/1
80 TABLET, FILM COATED ORAL EVERY EVENING
Status: DISCONTINUED | OUTPATIENT
Start: 2021-06-03 | End: 2021-06-04 | Stop reason: HOSPADM

## 2021-06-03 RX ORDER — PROCHLORPERAZINE 25 MG
25 SUPPOSITORY, RECTAL RECTAL EVERY 12 HOURS PRN
Status: DISCONTINUED | OUTPATIENT
Start: 2021-06-03 | End: 2021-06-04 | Stop reason: HOSPADM

## 2021-06-03 RX ORDER — MIRABEGRON 50 MG/1
50 TABLET, EXTENDED RELEASE ORAL EVERY EVENING
Status: DISCONTINUED | OUTPATIENT
Start: 2021-06-03 | End: 2021-06-04 | Stop reason: HOSPADM

## 2021-06-03 RX ORDER — IOPAMIDOL 755 MG/ML
70 INJECTION, SOLUTION INTRAVASCULAR ONCE
Status: COMPLETED | OUTPATIENT
Start: 2021-06-03 | End: 2021-06-03

## 2021-06-03 RX ORDER — ACETAMINOPHEN 325 MG/1
650 TABLET ORAL EVERY 4 HOURS PRN
Status: DISCONTINUED | OUTPATIENT
Start: 2021-06-03 | End: 2021-06-04 | Stop reason: HOSPADM

## 2021-06-03 RX ORDER — LIOTHYRONINE SODIUM 25 UG/1
25 TABLET ORAL EVERY EVENING
Status: DISCONTINUED | OUTPATIENT
Start: 2021-06-03 | End: 2021-06-04 | Stop reason: HOSPADM

## 2021-06-03 RX ORDER — DIAZEPAM 5 MG
5 TABLET ORAL EVERY 8 HOURS PRN
Status: DISCONTINUED | OUTPATIENT
Start: 2021-06-03 | End: 2021-06-04 | Stop reason: HOSPADM

## 2021-06-03 RX ORDER — CYCLOBENZAPRINE HCL 10 MG
10 TABLET ORAL 3 TIMES DAILY
Status: DISCONTINUED | OUTPATIENT
Start: 2021-06-03 | End: 2021-06-04 | Stop reason: HOSPADM

## 2021-06-03 RX ORDER — WARFARIN SODIUM 2 MG/1
2 TABLET ORAL
Status: COMPLETED | OUTPATIENT
Start: 2021-06-03 | End: 2021-06-03

## 2021-06-03 RX ORDER — ONDANSETRON 2 MG/ML
4 INJECTION INTRAMUSCULAR; INTRAVENOUS EVERY 6 HOURS PRN
Status: DISCONTINUED | OUTPATIENT
Start: 2021-06-03 | End: 2021-06-04 | Stop reason: HOSPADM

## 2021-06-03 RX ORDER — GUANFACINE 1 MG/1
1 TABLET ORAL AT BEDTIME
Status: DISCONTINUED | OUTPATIENT
Start: 2021-06-03 | End: 2021-06-04 | Stop reason: HOSPADM

## 2021-06-03 RX ORDER — ONDANSETRON 4 MG/1
4 TABLET, ORALLY DISINTEGRATING ORAL EVERY 6 HOURS PRN
Status: DISCONTINUED | OUTPATIENT
Start: 2021-06-03 | End: 2021-06-04 | Stop reason: HOSPADM

## 2021-06-03 RX ORDER — GADOBUTROL 604.72 MG/ML
14 INJECTION INTRAVENOUS ONCE
Status: COMPLETED | OUTPATIENT
Start: 2021-06-03 | End: 2021-06-03

## 2021-06-03 RX ORDER — CETIRIZINE HYDROCHLORIDE 10 MG/1
10 TABLET ORAL EVERY MORNING
Status: DISCONTINUED | OUTPATIENT
Start: 2021-06-04 | End: 2021-06-04 | Stop reason: HOSPADM

## 2021-06-03 RX ORDER — ASPIRIN 81 MG/1
81 TABLET ORAL DAILY
Status: DISCONTINUED | OUTPATIENT
Start: 2021-06-04 | End: 2021-06-04 | Stop reason: HOSPADM

## 2021-06-03 RX ORDER — LAMOTRIGINE 25 MG/1
25 TABLET ORAL DAILY
Status: DISCONTINUED | OUTPATIENT
Start: 2021-06-04 | End: 2021-06-04

## 2021-06-03 RX ORDER — VENLAFAXINE HYDROCHLORIDE 150 MG/1
300 CAPSULE, EXTENDED RELEASE ORAL EVERY EVENING
Status: DISCONTINUED | OUTPATIENT
Start: 2021-06-03 | End: 2021-06-04 | Stop reason: HOSPADM

## 2021-06-03 RX ORDER — PROPAFENONE HYDROCHLORIDE 150 MG/1
150 TABLET, COATED ORAL EVERY 8 HOURS
Status: DISCONTINUED | OUTPATIENT
Start: 2021-06-03 | End: 2021-06-04 | Stop reason: HOSPADM

## 2021-06-03 RX ORDER — LEVOTHYROXINE SODIUM 150 UG/1
150 TABLET ORAL EVERY EVENING
Status: DISCONTINUED | OUTPATIENT
Start: 2021-06-03 | End: 2021-06-04 | Stop reason: HOSPADM

## 2021-06-03 RX ORDER — MEMANTINE HYDROCHLORIDE 10 MG/1
10 TABLET ORAL 2 TIMES DAILY
Status: DISCONTINUED | OUTPATIENT
Start: 2021-06-03 | End: 2021-06-04 | Stop reason: HOSPADM

## 2021-06-03 RX ORDER — PROCHLORPERAZINE MALEATE 10 MG
10 TABLET ORAL EVERY 6 HOURS PRN
Status: DISCONTINUED | OUTPATIENT
Start: 2021-06-03 | End: 2021-06-04 | Stop reason: HOSPADM

## 2021-06-03 RX ORDER — DONEPEZIL HYDROCHLORIDE 10 MG/1
20 TABLET, FILM COATED ORAL AT BEDTIME
Status: DISCONTINUED | OUTPATIENT
Start: 2021-06-03 | End: 2021-06-04 | Stop reason: HOSPADM

## 2021-06-03 RX ORDER — LIDOCAINE 40 MG/G
CREAM TOPICAL
Status: DISCONTINUED | OUTPATIENT
Start: 2021-06-03 | End: 2021-06-04 | Stop reason: HOSPADM

## 2021-06-03 RX ORDER — PREGABALIN 100 MG/1
100 CAPSULE ORAL 3 TIMES DAILY
Status: DISCONTINUED | OUTPATIENT
Start: 2021-06-03 | End: 2021-06-04 | Stop reason: HOSPADM

## 2021-06-03 RX ORDER — ACETAMINOPHEN 650 MG/1
650 SUPPOSITORY RECTAL EVERY 4 HOURS PRN
Status: DISCONTINUED | OUTPATIENT
Start: 2021-06-03 | End: 2021-06-04 | Stop reason: HOSPADM

## 2021-06-03 RX ADMIN — MIRABEGRON 50 MG: 50 TABLET, FILM COATED, EXTENDED RELEASE ORAL at 19:40

## 2021-06-03 RX ADMIN — CYCLOBENZAPRINE 10 MG: 10 TABLET, FILM COATED ORAL at 15:39

## 2021-06-03 RX ADMIN — PREGABALIN 100 MG: 100 CAPSULE ORAL at 21:07

## 2021-06-03 RX ADMIN — LIOTHYRONINE SODIUM 25 MCG: 25 TABLET ORAL at 19:41

## 2021-06-03 RX ADMIN — CYCLOBENZAPRINE 10 MG: 10 TABLET, FILM COATED ORAL at 21:07

## 2021-06-03 RX ADMIN — MEMANTINE 10 MG: 10 TABLET ORAL at 21:07

## 2021-06-03 RX ADMIN — GUANFACINE 1 MG: 1 TABLET ORAL at 21:06

## 2021-06-03 RX ADMIN — ATORVASTATIN CALCIUM 80 MG: 80 TABLET, FILM COATED ORAL at 19:40

## 2021-06-03 RX ADMIN — IOPAMIDOL 70 ML: 755 INJECTION, SOLUTION INTRAVENOUS at 10:42

## 2021-06-03 RX ADMIN — VENLAFAXINE HYDROCHLORIDE 300 MG: 150 CAPSULE, EXTENDED RELEASE ORAL at 19:41

## 2021-06-03 RX ADMIN — DONEPEZIL HYDROCHLORIDE 20 MG: 10 TABLET ORAL at 21:07

## 2021-06-03 RX ADMIN — SODIUM CHLORIDE 100 ML: 9 INJECTION, SOLUTION INTRAVENOUS at 10:43

## 2021-06-03 RX ADMIN — GADOBUTROL 14 ML: 604.72 INJECTION INTRAVENOUS at 19:09

## 2021-06-03 RX ADMIN — PREGABALIN 100 MG: 100 CAPSULE ORAL at 15:39

## 2021-06-03 RX ADMIN — WARFARIN SODIUM 2 MG: 2 TABLET ORAL at 18:21

## 2021-06-03 RX ADMIN — PROPAFENONE HYDROCHLORIDE 150 MG: 150 TABLET, FILM COATED ORAL at 16:44

## 2021-06-03 RX ADMIN — LEVOTHYROXINE SODIUM 150 MCG: 150 TABLET ORAL at 19:41

## 2021-06-03 RX ADMIN — PROPAFENONE HYDROCHLORIDE 150 MG: 150 TABLET, FILM COATED ORAL at 22:27

## 2021-06-03 ASSESSMENT — ENCOUNTER SYMPTOMS
NUMBNESS: 1
WEAKNESS: 1

## 2021-06-03 NOTE — PROGRESS NOTES
Patient here with rule out stroke. A & Ox 4, forgetful. Neuros intact with exception of baseline tingling in BLE. Regular diet. A1GB.Patient scores green on Aggression Assessment Tool. Plan: possibly CARMEN tomorrow.  Contact precaution for MRSA

## 2021-06-03 NOTE — PLAN OF CARE
PT orders received, chart reviewed. Pt here with stroke like symptoms, recently admitted with similar symptoms. Awaiting MRI, will allow for further work-up prior to PT evaluation for best assessment of mobility.

## 2021-06-03 NOTE — CONSULTS
"[facility]    Stroke Consult Note    Reason for Consult: Stroke Code    Chief Complaint: One-sided Weakness      HPI  This is a 65 year old male patient with complicated history of aortic dissection s/p mechanical valve plus graft, atrial fibrillation, anticoagulated with warfarin and asa, recent right thalamic infarct last month who presents to the hospital today with left hand and mouth paresthesia and unsteadiness that started at 0950 am. In April  he had small acute infarcts scattered throughout the left greater than right cerebral hemispheres. CTA with no acute findings.     Thrombolytic Treatment   Not given due to minor/isolated/quickly resolving symptoms and DOAC dose within 48 hours or INR > 1.7.    Endovascular Treatment  Not initiated due to absence of proximal vessel occlusion    Impression  Ischemic Stroke due to embolic stroke of undetermined source (ESUS) vs recrudescence of recent right thalamic infarct    Recommendations  - Use orderset: \"Ischemic Stroke Routine Admission\" or \"Ischemic Stroke No Thrombolytics/No Thrombectomy ICU Admission\"  - Neurochecks and Vital Signs every 4 hours   - Permissive HTN; goal SBP < 220 mmHg  - Daily aspirin 81 mg for secondary stroke prevention, continue Coumadin   - Statin: PTA statin  - MRI Brain with and without contrast  - Telemetry, EKG  - Bedside Glucose Monitoring  - Troponin x 3  - PT/OT/SLP    Thank you for this consult. We will continue to follow.     The patient was discussed with Stroke Fellow, Dr. Simmons.  The Stroke Staff is Dr. Maxwell.    Nancy Flanagan, AMARIS, [facility]  Neurology  06/03/2021 1:32 PM  To page stroke neurology after hours or on a subsequent day, click here: AMCOM  Choose \"On Call\" tab at top, then search dropdown box for \"Neurology Adult\" & press Enter, look for Neuro ICU/Stroke       ______________________________________________________    Past Medical History   Past Medical History:   Diagnosis Date     " Alcohol dependence (H)      Allergy, unspecified not elsewhere classified     seasonal     Antiplatelet or antithrombotic long-term use     coumadin/lovenox     Aortic valve prosthesis present      Atrial fibrillation (H)      Blood transfusion     after heart surg 1992     BPH (benign prostatic hypertrophy)      Chronic infection     low back wound incision not healing      Chronic pain     lower back and right leg and left leg     Coagulation disorder (H)     on blood thinners     Dissection of aorta, thoracic (H) 1992    St Jose F aotic valve + arch graft 1992     Headache(784.0)      Heart murmur     aortic valve replaced and arch     History of spinal cord injury      Low back pain      Major depression      Mixed hyperlipidemia      Numbness and tingling     right leg post surg rightt hip/ also left leg since surg     OA (osteoarthritis)     hips     Obesity, unspecified      Prostate infection      Sciatica 2002    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 Jose F's valve     APPLY WOUND VAC Left 1/26/2021    Procedure: Placement of negative pressure wound therapy 2,400 squared centimeters  ;  Surgeon: Lazaro Plascencia MD;  Location: [facility] OR     C TOTAL HIP ARTHROPLASTY  2010    Left AMANDA     C TOTAL HIP ARTHROPLASTY  2002    R hip replacement comp's by nerve injury with pain down into R leg, nadya below knee     CATARACT IOL, RT/LT      rt eye only     CHOLECYSTECTOMY, LAPOROSCOPIC  8/10     CLOSE SECONDARY WOUND LOWER EXTREMITY Left 2/3/2021    Procedure:  Split Thickness Skin Graft to Leg of 18 square centimeters  Intermediate Closure of Leg of 8cm   ;  Surgeon: Lazaro Plascencia MD;  Location: [facility] OR     COLONOSCOPY N/A 1/15/2015    Procedure: COLONOSCOPY;  Surgeon: Johnson Kothari MD;  Location:  GI     DECOMPRESSION LUMBAR ONE LEVEL  4/3/2013    Procedure:  DECOMPRESSION LUMBAR ONE LEVEL;  Open Decompression L3-4 bilateral;  Surgeon: Khalif Coffey MD;  Location: [facility] OR     DECOMPRESSION, FUSION CERVICAL ANTERIOR ONE LEVEL, COMBINED  3/23/2012    Procedure:COMBINED DECOMPRESSION, FUSION CERVICAL ANTERIOR ONE LEVEL; Anterior Cervical Decompression and Fusion C4-6; Surgeon:KHALIF COFFEY; Location:[facility] OR     EXPLORE SPINE, REMOVE HARDWARE, COMBINED  5/23/2013    Procedure: COMBINED EXPLORE SPINE, REMOVE HARDWARE;  Exploration Lumbar Wound for fluid collection;  Surgeon: Khalif Coffey MD;  Location: [facility] OR     FUSION CERVICAL ANTERIOR TWO LEVELS  3/26/2012    Procedure:FUSION CERVICAL ANTERIOR TWO LEVELS; Anterior Cervical Fusion C4-6, Anterior Cervical  Hematoma Evacuation; Surgeon:KHALIF COFFEY; Location:[facility] OR     IR LUMBAR EPIDURAL STEROID INJECTION  3/28/2003     IRRIGATION AND DEBRIDEMENT LOWER EXTREMITY, COMBINED Left 1/10/2021    Procedure: 1.  Irrigation and excisional debridement to muscle left distal medial calf chronic wounds total area measuring 9 cm x 4 cm 2.  Irrigation and excisional debridement to fascia left medial calf chronic hematoma measuring 29 x 6.7 x 4.2 cm 3.  Primary complex wound closure left medial distal calf 9 cm in length;  Surgeon: Rod Kemp MD;  Location: [facility] OR     IRRIGATION AND DEBRIDEMENT LOWER EXTREMITY, COMBINED Left 1/26/2021    Procedure: Evacuation of hematoma debridement of skin, subcutaneous tissue and muscle 2,400 squared centimeters, placement of negative pressure wound therapy 2,400 squared centimeters;  Surgeon: Lazaro Plascencia MD;  Location: [facility] OR     IRRIGATION AND DEBRIDEMENT SPINE, CLOSE WOUND, COMBINED  3/26/2012    Procedure:COMBINED IRRIGATION AND DEBRIDEMENT SPINE, CLOSE WOUND; Surgeon:KHALIF COFFEY; Location:[facility] OR     removal of cyst of back   2.5week     TONSILLECTOMY       wisdom teeth[       ZZC NONSPECIFIC PROCEDURE  1992    repair of TAA with graft     Medications   Home  Meds  Prior to Admission medications    Medication Sig Start Date End Date Taking? Authorizing Provider   acetaminophen (TYLENOL) 500 MG tablet Take 1-2 tablets (500-1,000 mg) by mouth every 8 hours as needed for mild pain 2/7/21  Yes Aurelio Etienne,    aspirin (ASA) 81 MG EC tablet Take 1 tablet (81 mg) by mouth daily 4/27/21  Yes Jc Friedman MD   atorvastatin (LIPITOR) 80 MG tablet Take 1 tablet (80 mg) by mouth every evening 5/19/21  Yes Jamshid Weber PA   cetirizine (ZYRTEC) 10 MG tablet Take 10 mg by mouth every morning   Yes Unknown, Entered By History   cyclobenzaprine (FLEXERIL) 10 MG tablet TAKE ONE TABLET BY MOUTH THREE TIMES DAILY AS NEEDED FOR MUSCLE SPASM.  Patient taking differently: Take 10 mg by mouth 3 times daily  3/9/21  Yes Obdulio Ingram MD   diazepam (VALIUM) 5 MG tablet TAKE ONE TABLET BY MOUTH EVERY EIGHT HOURS AS NEEDED FOR ANXIETY OR MUSCLE SPASM 4/28/21  Yes Obdulio Ingram MD   donepezil (ARICEPT) 10 MG tablet Take 2 tablets (20 mg) by mouth At Bedtime. Stop if diarrhea or nausea develop 5/30/21  Yes Obdulio Ingram MD   folic acid (FOLVITE) 1 MG tablet Take 5 tablets (5 mg) by mouth daily 2/22/21  Yes Jody Tanner MD   guanFACINE (TENEX) 1 MG tablet Take 1 tablet (1 mg) by mouth At Bedtime 4/5/21  Yes Savi Ken CNP   lamoTRIgine (LAMICTAL) 25 MG tablet Take 1 tablet (25 mg) by mouth daily 5/30/21  Yes Obdulio Ingram MD   levothyroxine (SYNTHROID/LEVOTHROID) 150 MCG tablet Take 1 tablet (150 mcg) by mouth daily  Patient taking differently: Take 150 mcg by mouth every evening  12/11/20  Yes Obdulio Ingram MD   liothyronine (CYTOMEL) 25 MCG tablet Take 1 tablet (25 mcg) by mouth daily  Patient taking differently: Take 25 mcg by mouth every evening  2/22/21  Yes Jody Tanner MD   memantine (NAMENDA) 10 MG tablet Take 1 tablet (10 mg) by mouth 2 times daily 5/6/21  Yes Obdulio Ingram MD   mirabegron (MYRBETRIQ) 50 MG 24 hr tablet Take 1  tablet (50 mg) by mouth daily  Patient taking differently: Take 50 mg by mouth every evening  20  Yes Obdulio Ingram MD   potassium chloride ER (K-TAB) 20 MEQ CR tablet Take 1 tablet (20 mEq) by mouth daily as needed (Take one tablet for each furosemide tablet that is taken) 4/15/21  Yes Obdulio Ingram MD   pregabalin (LYRICA) 100 MG capsule Take 1 capsule (100 mg) by mouth 3 times daily Do not take if sleepy/sedated. 21  Yes Savi Ken CNP   propafenone (RYTHMOL) 150 MG TABS tablet Take 1 tablet (150 mg) by mouth every 8 hours 12/10/20  Yes Obdulio Ingram MD   senna-docusate (SENOKOT-S;PERICOLACE) 8.6-50 MG per tablet Take 1 tablet by mouth 2 times daily as needed for constipation 18  Yes Obdulio Ingram MD   venlafaxine (EFFEXOR-XR) 150 MG 24 hr capsule Take 2 capsules (300 mg) by mouth daily  Patient taking differently: Take 300 mg by mouth daily Takes in the afternoon. 21  Yes Obdulio Ingram MD   warfarin ANTICOAGULANT (COUMADIN) 2.5 MG tablet Take 5 mg by mouth three times a week Sun, Wed, Fri   Yes Unknown, Entered By History   azelastine (ASTELIN) 0.1 % nasal spray USE 2 SPRAYS IN NOSTRIL 2 TIMES DAILY AS NEEDED. 20   Obdulio Ingram MD   furosemide (LASIX) 20 MG tablet Take 1-2 tablets (20-40 mg) by mouth daily as needed (leg swelling) 4/15/21   Obdulio Ingram MD   warfarin ANTICOAGULANT (COUMADIN) 2.5 MG tablet Take 7.5 mg by mouth four times a week Mon, Tues, Th, Sat    Unknown, Entered By History       Scheduled Meds      Infusion Meds      PRN Meds      Allergies   Allergies   Allergen Reactions     Gabapentin      Severe behavioral disturbances     Family History   Family History   Problem Relation Age of Onset     Cerebrovascular Disease Father          age 86, had M.I. ,diabetic also     Prostate Cancer Father      Cancer Mother          age 83 of dementia, also h/o lymphoma     Hypertension Brother         2 brothers with hypertension & sleep apnea      Hypertension Sister         Born ~1936     Sleep Apnea Brother          age 78, Alzheimers     No Known Problems Son      No Known Problems Daughter      No Known Problems Son      Family History Negative Brother         Had 5 brothers, three still alive as of 2019     Colon Cancer No family hx of      Social History   Social History     Tobacco Use     Smoking status: Never Smoker     Smokeless tobacco: Never Used   Substance Use Topics     Alcohol use: No     Comment: Stopped drinking alcohol ~     Drug use: No       Review of Systems   The 10 point Review of Systems is negative other than noted in the HPI       PHYSICAL EXAMINATION  Temp:  [97.4  F (36.3  C)] 97.4  F (36.3  C)  Pulse:  [70-75] 73  Resp:  [10-20] 12  BP: (122-140)/() 139/90  SpO2:  [93 %-100 %] 93 %     Neurologic  Mental Status:  alert, oriented x 3, follows commands, speech clear and fluent, naming and repetition normal  Cranial Nerves:  visual fields intact, PERRL, facial movements symmetric, hearing not formally tested but intact to conversation, palate elevation symmetric and uvula midline, no dysarthria, shoulder shrug strong bilaterally, tongue protrusion midline, limited right eye abudction at baseline   Motor:  normal muscle tone and bulk, no abnormal movements, able to move all limbs spontaneously, no pronator drift, left hip flexion 4+/5   Reflexes:  no clonus  Sensory:  slightly decreased left hand sensation compared to the right side  Coordination:  normal finger-to-nose and heel-to-shin bilaterally without dysmetria, rapid alternating movements symmetric  Station/Gait:  deferred      Dysphagia Screen  failed, pending SLP evaluation     Stroke Scales  National [employer] Stroke Scale  Exam Interval: Baseline   Score    Level of consciousness: (0)   Alert, keenly responsive    LOC questions: (0)   Answers both questions correctly    LOC commands: (0)   Performs both tasks correctly    Best gaze: (0)   Normal     Visual: (0)   No visual loss    Facial palsy: (0)   Normal symmetrical movements    Motor arm (left): (0)   No drift    Motor arm (right): (0)   No drift    Motor leg (left): (0)   No drift    Motor leg (right): (0)   No drift    Limb ataxia: (0)   Absent    Sensory: (1)   Mild to moderate sensory loss - similar to previous stroke    Best language: (0)   Normal- no aphasia    Dysarthria: (0)   Normal    Extinction and inattention: (0)   No abnormality        Total Score:  1         Imaging  I personally reviewed all imaging; relevant findings per HPI.     Lab Results Data   CBC  Recent Labs   Lab 06/03/21  1026   WBC 6.0   RBC 4.77   HGB 13.9   HCT 43.4        Basic Metabolic Panel    Recent Labs   Lab 06/03/21  1026      POTASSIUM 4.2   CHLORIDE 109   CO2 33*   BUN 20   CR 1.06   GLC 89   CATERINA 8.5     Liver Panel  No results for input(s): PROTTOTAL, ALBUMIN, BILITOTAL, ALKPHOS, AST, ALT, BILIDIRECT in the last 168 hours.  INR    Recent Labs   Lab Test 06/03/21  1026 05/28/21 05/21/21   INR 3.82* 3.3* 2.9*      Lipid Profile    Recent Labs   Lab Test 04/18/21  1759 01/23/21  0748 12/11/20  1545 12/24/14  0846 12/24/14  0846 12/17/13  1012   CHOL 254* 135 226*   < > 166 170   HDL 43 44 39*   < > 41 30*   * 65 149*   < > 78 74   TRIG 233* 130 192*   < > 234* 331*   CHOLHDLRATIO  --   --   --   --  4.0 5.7*    < > = values in this interval not displayed.     A1C    Recent Labs   Lab Test 01/23/21  0747   A1C 4.9     Troponin I    Recent Labs   Lab 06/03/21  1026   TROPI <0.015          Stroke Code / Stroke Consult Data Data   Stroke Code Data  (for stroke code without tele)  Stroke code activated 1028   First stroke provider response 1030   Last known normal 0945   Time of discovery   (or onset of symptoms) 0950   Head CT read by Stroke Neuro Dr/Provider 1040   Was stroke code de-escalated? Yes, 1057

## 2021-06-03 NOTE — PHARMACY-ANTICOAGULATION SERVICE
Clinical Pharmacy - Warfarin Dosing Consult     Pharmacy has been consulted to manage this patient s warfarin therapy.  Indication: Mechanical Aortic Valve Replacement  Therapy Goal: INR 2.5-3.5  Warfarin Prior to Admission: Yes  Warfarin PTA Regimen: 5mg thomas,w,f & 7.5mg row  Significant drug interactions: ASA    INR   Date Value Ref Range Status   06/03/2021 3.82 (H) 0.86 - 1.14 Final   05/28/2021 3.3 (A) 0.90 - 1.10 Final       Recommend warfarin 2 mg today.  Pharmacy will monitor Todd S Aschoff daily and order warfarin doses to achieve specified goal.      Please contact pharmacy as soon as possible if the warfarin needs to be held for a procedure or if the warfarin goals change.

## 2021-06-03 NOTE — ED NOTES
[facility] [place]  ED Nurse Handoff Report    ED Chief complaint: One-sided Weakness      ED Diagnosis:   Final diagnoses:   Cerebrovascular accident (CVA), unspecified mechanism (H)       Code Status: not discussed by ED RN     Allergies:   Allergies   Allergen Reactions     Gabapentin      Severe behavioral disturbances       Patient Story: 65M, hx strokes. Left sided sxs started this AM  Focused Assessment:  Pt had sudden onset stroke symptoms that started this AM, see previous RNs notes. Patient reports all symptoms resolved now.     Treatments and/or interventions provided:   Patient's response to treatments and/or interventions:     To be done/followed up on inpatient unit:      Does this patient have any cognitive concerns?: unsure pts baseline from previous strokes    Activity level - Baseline/Home:  Independent  Activity Level - Current:   Unknown    Patient's Preferred language: English   Needed?: No    Isolation: None and Contact   Infection: Not Applicable  MRSA  Patient tested for COVID 19 prior to admission: YES  Bariatric?: No    Vital Signs:   Vitals:    06/03/21 1023 06/03/21 1037 06/03/21 1039   BP: (!) 140/91 125/80    Pulse: 75 75    Resp: 12     Temp:   97.4  F (36.3  C)   SpO2: 100% 96%    Weight: 142 kg (313 lb 0.9 oz)         Cardiac Rhythm:Cardiac Rhythm: Normal sinus rhythm    Was the PSS-3 completed:   Yes  What interventions are required if any?               Family Comments:   OBS brochure/video discussed/provided to patient/family: Yes              Name of person given brochure if not patient:               Relationship to patient:     For the majority of the shift this patient's behavior was Green.   Behavioral interventions performed were .    ED NURSE PHONE NUMBER: [phone]

## 2021-06-03 NOTE — ED NOTES
Bed: ST01  Expected date:   Expected time:   Means of arrival:   Comments:  [facility] STROKE ALERT   Left hand and foot numbness   Onset 0950

## 2021-06-03 NOTE — PHARMACY
Pharmacy Medication History  Admission medication history interview status for the 6/3/2021  admission is complete. See EPIC admission navigator for prior to admission medications     Location of Interview: Patient room  Medication history sources: Patient, Surescripts, Patient's home med list and Care Everywhere    Significant changes made to the medication list:  Warfarin dose was updated.    In the past week, patient estimated taking medication this percent of the time: greater than 90%      Medication reconciliation completed by provider prior to medication history? No    Time spent in this activity: 20 minutes    Prior to Admission medications    Medication Sig Last Dose Taking? Auth Provider   acetaminophen (TYLENOL) 500 MG tablet Take 1-2 tablets (500-1,000 mg) by mouth every 8 hours as needed for mild pain  at prn Yes Aurelio Etienne,    aspirin (ASA) 81 MG EC tablet Take 1 tablet (81 mg) by mouth daily 6/3/2021 at am Yes Jc Friedman MD   atorvastatin (LIPITOR) 80 MG tablet Take 1 tablet (80 mg) by mouth every evening 6/2/2021 at pm Yes Jamshid Weber PA   cetirizine (ZYRTEC) 10 MG tablet Take 10 mg by mouth every morning 6/3/2021 at am Yes Unknown, Entered By History   cyclobenzaprine (FLEXERIL) 10 MG tablet TAKE ONE TABLET BY MOUTH THREE TIMES DAILY AS NEEDED FOR MUSCLE SPASM.  Patient taking differently: Take 10 mg by mouth 3 times daily  6/3/2021 at am Yes Obdulio Ingram MD   diazepam (VALIUM) 5 MG tablet TAKE ONE TABLET BY MOUTH EVERY EIGHT HOURS AS NEEDED FOR ANXIETY OR MUSCLE SPASM Past Week at Unknown time Yes Obdulio Ingram MD   donepezil (ARICEPT) 10 MG tablet Take 2 tablets (20 mg) by mouth At Bedtime. Stop if diarrhea or nausea develop 6/2/2021 at hs Yes Obdulio Ingram MD   folic acid (FOLVITE) 1 MG tablet Take 5 tablets (5 mg) by mouth daily 6/3/2021 at am Yes Jody Tanner MD   guanFACINE (TENEX) 1 MG tablet Take 1 tablet (1 mg) by mouth At Bedtime 6/2/2021  at hs Yes Savi Ken CNP   lamoTRIgine (LAMICTAL) 25 MG tablet Take 1 tablet (25 mg) by mouth daily 6/3/2021 at am Yes Obdulio Ingram MD   levothyroxine (SYNTHROID/LEVOTHROID) 150 MCG tablet Take 1 tablet (150 mcg) by mouth daily  Patient taking differently: Take 150 mcg by mouth every evening  6/2/2021 at pm Yes Obdulio Ingram MD   liothyronine (CYTOMEL) 25 MCG tablet Take 1 tablet (25 mcg) by mouth daily  Patient taking differently: Take 25 mcg by mouth every evening  6/2/2021 at pm Yes Jody Tanner MD   memantine (NAMENDA) 10 MG tablet Take 1 tablet (10 mg) by mouth 2 times daily 6/3/2021 at am Yes Obdulio Ingram MD   mirabegron (MYRBETRIQ) 50 MG 24 hr tablet Take 1 tablet (50 mg) by mouth daily  Patient taking differently: Take 50 mg by mouth every evening  6/2/2021 at pm Yes Obdulio Ingram MD   potassium chloride ER (K-TAB) 20 MEQ CR tablet Take 1 tablet (20 mEq) by mouth daily as needed (Take one tablet for each furosemide tablet that is taken) 6/3/2021 at am Yes Obdulio Ingram MD   pregabalin (LYRICA) 100 MG capsule Take 1 capsule (100 mg) by mouth 3 times daily Do not take if sleepy/sedated. 6/3/2021 at am Yes Savi Ken CNP   propafenone (RYTHMOL) 150 MG TABS tablet Take 1 tablet (150 mg) by mouth every 8 hours 6/3/2021 at am Yes Obdulio Ingram MD   senna-docusate (SENOKOT-S;PERICOLACE) 8.6-50 MG per tablet Take 1 tablet by mouth 2 times daily as needed for constipation Past Week at Unknown time Yes Obdulio Ingram MD   venlafaxine (EFFEXOR-XR) 150 MG 24 hr capsule Take 2 capsules (300 mg) by mouth daily  Patient taking differently: Take 300 mg by mouth daily Takes in the afternoon. 6/2/2021 at pm Yes Obdulio Ingram MD   warfarin ANTICOAGULANT (COUMADIN) 2.5 MG tablet Take 5 mg by mouth three times a week Sun, Wed, Fri 6/2/2021 at pm Yes Unknown, Entered By History   azelastine (ASTELIN) 0.1 % nasal spray USE 2 SPRAYS IN NOSTRIL 2 TIMES DAILY AS NEEDED.   Obdulio Ingram MD    furosemide (LASIX) 20 MG tablet Take 1-2 tablets (20-40 mg) by mouth daily as needed (leg swelling)   Obdulio Ingram MD   warfarin ANTICOAGULANT (COUMADIN) 2.5 MG tablet Take 7.5 mg by mouth four times a week Mon, Tues, Thurs, Sat 6/1/2021 at pm  Unknown, Entered By History       The information provided in this note is only as accurate as the sources available at the time of update(s)

## 2021-06-03 NOTE — PROGRESS NOTES
RECEIVING UNIT ED HANDOFF REVIEW    ED Nurse Handoff Report was reviewed by: Adalgisa Be RN on Andra 3, 2021 at 12:27 PM

## 2021-06-03 NOTE — ED TRIAGE NOTES
Pt complained of waking up with facial, left hand, and left foot numbness, as well as gait difficulty.  Pt states these symptoms have currently resolved.

## 2021-06-03 NOTE — ED PROVIDER NOTES
History   Chief Complaint:  Left-Sided Face Numbness & Lower Extremity Weakness     HPI   Todd S Aschoff is a 65 year old male with history of stroke and atrial fibrillation on Coumadin who presents via EMS with tingling in his lips, left lower extremity weakness, and left sided numbness to his face. Per EMS, the patient called EMS 30 minutes ago endorsing acute onset left-sided numbness to his face and left arm, as well as tingling in his lips and a few of his left fingers. He also mentions mild left lower extremity weakness that is above baseline for him. The patient has a history of strokes, and his last stroke was on 5/17/21 with the exact same symptoms where he was admitted to [facility] and discharged 2 days afterwards. Has a recent history of CVA on 1/21 and 4/21.    Review of Systems   Neurological: Positive for weakness and numbness.   All other systems reviewed and are negative.      Allergies  Gabapentin     Medications  Flexeril  Valium  Aricept  Tenex  Synthroid/Levothroid  Cytomel  Namenda  Myrbetriq  Lyrica  Rythmol  Senokot-S/Pericolace  Zocor  Effexor-XR  Coumadin  Lipitor     Past Medical History  Alcohol dependence  Atrial fibrillation  BPH  Chronic low back pain  Coagulation disorder  Dissection of aorta, thoracic  Heart murmur  Depression  Hyperlipidemia  Osteoarthritis  Sciatica   Hypothyroidism   Lumbar radiculopathy  Tourette syndrome  Spinal stenosis  Radiculitis  Stroke     Past Surgical History  Aortic valve replacement  C total hip arthroplasty x2  Cataract removal right eye  Cholecystectomy   Colonoscopy  Lumbar decompression  Fusion cervical anterior decompression  Explore spine, remove hardware  Cervical anterior fusion   Irrigation and debridement of spine, close wound  Removal of cyst of back  Tonsillectomy   [place] tooth removal   Repair of TAA of graft  Elbow surgery     Family History  Cerebrovascular disease  Prostate cancer  Dementia  Lymphoma   Diabetes  Sleep  apnea  Hypertension     Social History  Presents to ED via EMS.  PCP: Obdulio Ingram    Physical Exam     Patient Vitals for the past 24 hrs:   BP Temp Pulse Resp SpO2 Weight   06/03/21 1039 -- 97.4  F (36.3  C) -- -- -- --   06/03/21 1037 125/80 -- 75 -- 96 % --   06/03/21 1023 (!) 140/91 -- 75 12 100 % 142 kg (313 lb 0.9 oz)       Physical Exam  Vitals: reviewed by me  General: Pt seen on [facility], cooperative, and alert to conversation  Eyes: Tracking well, clear conjunctiva BL  ENT: MMM, midline trachea.   Lungs: No tachypnea, no accessory muscle use. No respiratory distress.   CV: Rate as above  Abd: Soft, non tender, no guarding, no rebound. Non distended  MSK: no joint effusion.  No evidence of trauma  Skin: No rash  Neuro: Clear speech and no facial droop.  Bilateral upper extremities with sensation intact light touch and 5 and 5 motor throughout.  Does endorse subjective numbness to left index finger pinky finger and ring finger.  Right lower extremity sensation intact light touch and 5 and 5 motor throughout.  Left lower extremity with 4+ out of 5 strength.  Also endorses subjective numbness to both sides of his upper and lower lip.  No facial droop.  Psych: Not RIS, no e/o AH/VH    Emergency Department Course   ECG:  ECG taken at 1128, ECG read at 1135  Normal sinus rhythm  Left axis deviation  Abnormal ECG  Rate 75 bpm. NV interval 178 ms. QRS duration 100 ms. QT/QTc 384/428 ms. P-R-T axes 63 -32 67.    Imaging:  CT Head Neck with Contrast  1. Patent arteries in the neck without evidence of dissection. Mild  atherosclerotic disease in the carotid arteries bilaterally without  significant stenosis by NASCET criteria.  2. Patent proximal major intracranial arteries without vascular  cutoff. No significant stenosis. No aneurysm identified.  Reading per radiology     CT Head w/o Contrast   1. No evidence of acute intracranial hemorrhage, mass, or herniation.  2. Recent infarct in the right  thalamus on brain MR 5/17/2021 is not  appreciated by head CT.  3. Chronic appearing infarct in the right occipital lobe and right  Cerebellum.  Reading per radiology     Laboratory:  CBC: WBC 6.0, HGB 13.9,   BMP: Carbon dioxide 33 (H), Anion gap <1 (L) o/w WNL (Creatinine 1.06)    INR: 3.82 (H)  PTT: 45 (H)  Troponin (Collected 1026): <0.015    Symptomatic COVID-19 virus (Coronavirus) by PCR In process      Procedures  none    Emergency Department Course:  Reviewed:  1029 I reviewed the patient's nursing notes, vitals, past medical records, Care Everywhere.     Assessments:  1030 I performed an exam of the patient as documented above.     Consults:   1144 I spoke with Dr. Tejada of the hospitalist service at [facility] regarding the patient's symptoms and treatment options.     Disposition:  The patient was admitted to the hospital under the care of Dr. Tejada.     Impression & Plan       Medical Decision Making:  Todd S Aschoff is a 65 year old male who presents to the emergency department with what appears to be a CVA, possibly acute on chronic. He has a history of recent stroke, even 2 weeks ago, and is on blood thinners. He tells me that his symptoms today are identical to the last stroke. Here in the ER he is actually doing quite well, however he does have slight weakness in his left lower extremity, but otherwise seems to be able to move his extremities and follows all commands. I spoke with neurology, he is not a TPA candidate or ANDRES candidate, however they do want admitted to observation given his high risk features. They will do an MRI inpatient, therefore he will be admitted to Dr. Tejada under the observation designation.    Critical Care Time: was 30 minutes for this patient excluding procedures    Covid-19  Todd S Aschoff was evaluated during a global COVID-19 pandemic, which necessitated consideration that the patient might be at risk for infection with the SARS-CoV-2  virus that causes COVID-19.   Applicable protocols for evaluation were followed during the patient's care.   COVID-19 was considered as part of the patient's evaluation. The plan for testing is:  a test was obtained during this visit.    Diagnosis:    ICD-10-CM    1. Cerebrovascular accident (CVA), unspecified mechanism (H)  I63.9 Asymptomatic SARS-CoV-2 COVID-19 Virus (Coronavirus) by PCR       Scribe Disclosure:  Rehan DUARTE, am serving as a scribe at 10:35 AM on 6/3/2021 to document services personally performed by Kofi Zuniga MD based on my observations and the provider's statements to me.       Kem Zuniga MD  06/03/21 2264

## 2021-06-03 NOTE — PLAN OF CARE
SLP: ST orders received, chart reviewed and discussed with pt. Pt recently evaluated by SLP with functional swallow noted at this time. Pt reported that lip numbness has resolved since admission. Pt denied any changes in swallow/speech/language function. Pt in agreement with no SLP services at this time.

## 2021-06-03 NOTE — PLAN OF CARE
Patient here with rule out stroke. A & Ox 4. Neuros intact with exception of baseline tingling in BLE. Arrived on unit at 1330. Regular diet. Patient scores green on Aggression Assessment Tool. Stroke work-up to follow.

## 2021-06-04 ENCOUNTER — DOCUMENTATION ONLY (OUTPATIENT)
Dept: INTERNAL MEDICINE | Facility: CLINIC | Age: 66
End: 2021-06-04

## 2021-06-04 ENCOUNTER — APPOINTMENT (OUTPATIENT)
Dept: OCCUPATIONAL THERAPY | Facility: CLINIC | Age: 66
End: 2021-06-04
Payer: COMMERCIAL

## 2021-06-04 VITALS
DIASTOLIC BLOOD PRESSURE: 97 MMHG | HEART RATE: 79 BPM | OXYGEN SATURATION: 94 % | TEMPERATURE: 97.8 F | BODY MASS INDEX: 41.16 KG/M2 | SYSTOLIC BLOOD PRESSURE: 149 MMHG | RESPIRATION RATE: 18 BRPM | WEIGHT: 311.95 LBS

## 2021-06-04 DIAGNOSIS — Z79.01 LONG TERM CURRENT USE OF ANTICOAGULANT THERAPY: ICD-10-CM

## 2021-06-04 DIAGNOSIS — I48.20 CHRONIC ATRIAL FIBRILLATION (H): ICD-10-CM

## 2021-06-04 DIAGNOSIS — Z95.2 AORTIC VALVE PROSTHESIS PRESENT: ICD-10-CM

## 2021-06-04 DIAGNOSIS — I48.91 ATRIAL FIBRILLATION (H): ICD-10-CM

## 2021-06-04 LAB
ANION GAP SERPL CALCULATED.3IONS-SCNC: 2 MMOL/L (ref 3–14)
BUN SERPL-MCNC: 16 MG/DL (ref 7–30)
CALCIUM SERPL-MCNC: 8.8 MG/DL (ref 8.5–10.1)
CHLORIDE SERPL-SCNC: 106 MMOL/L (ref 94–109)
CO2 SERPL-SCNC: 33 MMOL/L (ref 20–32)
CREAT SERPL-MCNC: 1.02 MG/DL (ref 0.66–1.25)
ERYTHROCYTE [DISTWIDTH] IN BLOOD BY AUTOMATED COUNT: 15 % (ref 10–15)
FACT X ACT/NOR PPP CHRO: 17 % (ref 70–130)
GFR SERPL CREATININE-BSD FRML MDRD: 77 ML/MIN/{1.73_M2}
GLUCOSE BLDC GLUCOMTR-MCNC: 84 MG/DL (ref 70–99)
GLUCOSE SERPL-MCNC: 100 MG/DL (ref 70–99)
HCT VFR BLD AUTO: 42.2 % (ref 40–53)
HGB BLD-MCNC: 13.9 G/DL (ref 13.3–17.7)
INR PPP: 3.72 (ref 0.86–1.14)
MCH RBC QN AUTO: 30.1 PG (ref 26.5–33)
MCHC RBC AUTO-ENTMCNC: 32.9 G/DL (ref 31.5–36.5)
MCV RBC AUTO: 91 FL (ref 78–100)
PLATELET # BLD AUTO: 246 10E9/L (ref 150–450)
POTASSIUM SERPL-SCNC: 4.3 MMOL/L (ref 3.4–5.3)
RBC # BLD AUTO: 4.62 10E12/L (ref 4.4–5.9)
SODIUM SERPL-SCNC: 141 MMOL/L (ref 133–144)
WBC # BLD AUTO: 6.2 10E9/L (ref 4–11)

## 2021-06-04 PROCEDURE — 99207 PR CDG-CODE CATEGORY CHANGED: CPT | Performed by: PSYCHIATRY & NEUROLOGY

## 2021-06-04 PROCEDURE — 85610 PROTHROMBIN TIME: CPT | Performed by: PHYSICIAN ASSISTANT

## 2021-06-04 PROCEDURE — 85027 COMPLETE CBC AUTOMATED: CPT | Performed by: PHYSICIAN ASSISTANT

## 2021-06-04 PROCEDURE — 99214 OFFICE O/P EST MOD 30 MIN: CPT | Performed by: PSYCHIATRY & NEUROLOGY

## 2021-06-04 PROCEDURE — 80048 BASIC METABOLIC PNL TOTAL CA: CPT | Performed by: PHYSICIAN ASSISTANT

## 2021-06-04 PROCEDURE — 97165 OT EVAL LOW COMPLEX 30 MIN: CPT | Mod: GO | Performed by: OCCUPATIONAL THERAPIST

## 2021-06-04 PROCEDURE — G0378 HOSPITAL OBSERVATION PER HR: HCPCS

## 2021-06-04 PROCEDURE — 36415 COLL VENOUS BLD VENIPUNCTURE: CPT | Performed by: PHYSICIAN ASSISTANT

## 2021-06-04 PROCEDURE — 250N000013 HC RX MED GY IP 250 OP 250 PS 637: Performed by: PHYSICIAN ASSISTANT

## 2021-06-04 PROCEDURE — 999N001017 HC STATISTIC GLUCOSE BY METER IP

## 2021-06-04 PROCEDURE — 99217 PR OBSERVATION CARE DISCHARGE: CPT | Performed by: INTERNAL MEDICINE

## 2021-06-04 RX ORDER — WARFARIN SODIUM 1 MG/1
1 TABLET ORAL
Status: DISCONTINUED | OUTPATIENT
Start: 2021-06-04 | End: 2021-06-04 | Stop reason: HOSPADM

## 2021-06-04 RX ORDER — LAMOTRIGINE 25 MG/1
25 TABLET ORAL 2 TIMES DAILY
Qty: 90 TABLET | Refills: 0 | Status: SHIPPED | OUTPATIENT
Start: 2021-06-04 | End: 2021-06-18

## 2021-06-04 RX ORDER — WARFARIN SODIUM 2.5 MG/1
5 TABLET ORAL
Start: 2021-06-05 | End: 2021-06-18

## 2021-06-04 RX ORDER — WARFARIN SODIUM 2.5 MG/1
7.5 TABLET ORAL
Start: 2021-06-04 | End: 2021-06-18

## 2021-06-04 RX ORDER — LAMOTRIGINE 25 MG/1
25 TABLET ORAL 2 TIMES DAILY
Status: DISCONTINUED | OUTPATIENT
Start: 2021-06-04 | End: 2021-06-04 | Stop reason: HOSPADM

## 2021-06-04 RX ADMIN — PROPAFENONE HYDROCHLORIDE 150 MG: 150 TABLET, FILM COATED ORAL at 14:32

## 2021-06-04 RX ADMIN — PREGABALIN 100 MG: 100 CAPSULE ORAL at 09:32

## 2021-06-04 RX ADMIN — PREGABALIN 100 MG: 100 CAPSULE ORAL at 16:27

## 2021-06-04 RX ADMIN — CETIRIZINE HYDROCHLORIDE 10 MG: 10 TABLET, FILM COATED ORAL at 09:32

## 2021-06-04 RX ADMIN — MEMANTINE 10 MG: 10 TABLET ORAL at 09:32

## 2021-06-04 RX ADMIN — LAMOTRIGINE 25 MG: 25 TABLET ORAL at 09:32

## 2021-06-04 RX ADMIN — PROPAFENONE HYDROCHLORIDE 150 MG: 150 TABLET, FILM COATED ORAL at 06:52

## 2021-06-04 RX ADMIN — ASPIRIN 81 MG: 81 TABLET, COATED ORAL at 09:32

## 2021-06-04 RX ADMIN — CYCLOBENZAPRINE 10 MG: 10 TABLET, FILM COATED ORAL at 09:32

## 2021-06-04 RX ADMIN — CYCLOBENZAPRINE 10 MG: 10 TABLET, FILM COATED ORAL at 16:27

## 2021-06-04 ASSESSMENT — ACTIVITIES OF DAILY LIVING (ADL): PREVIOUS_RESPONSIBILITIES: MEAL PREP

## 2021-06-04 NOTE — PROGRESS NOTES
Doing well, feels his normal self.  No new neuro issues overnight or today.  MRI negative for acute CVA.  Plan continued current medications with slight change in warfarin dosing for discharge.  Recheck INR Monday.  Also neurology increased lamotrigine to BID and a new Rx will be sent to his pharmacy.  EEG/outpatient general neuro follow-up recommended.  All his questions answered.  I also called his wife and reviewed everything and answered her questions.

## 2021-06-04 NOTE — CONSULTS
Care Management Initial Consult    General Information  Assessment completed with: Patient,    Type of CM/SW Visit: Offer D/C Planning    Primary Care Provider verified and updated as needed: Yes   Readmission within the last 30 days: unable to assess         Advance Care Planning: Advance Care Planning Reviewed: no concerns identified          Communication Assessment  Patient's communication style: spoken language (English or Bilingual)    Hearing Difficulty or Deaf: yes   Wear Glasses or Blind: yes    Cognitive  Cognitive/Neuro/Behavioral: WDL  Level of Consciousness: alert  Arousal Level: opens eyes spontaneously  Orientation: oriented x 4  Mood/Behavior: calm, cooperative  Best Language: 0 - No aphasia  Speech: clear, spontaneous, logical    Living Environment:   People in home: spouse     Current living Arrangements: house(split level)      Able to return to prior arrangements: yes       Family/Social Support:  Care provided by:    Provides care for:       Wife  Paul       Description of Support System: Supportive         Current Resources:   Patient receiving home care services: Yes  Skilled Home Care Services: Skilled Nursing  Community Resources:    Equipment currently used at home: cane, straight, walker, standard, raised toilet seat(hasn't been using AD)  Supplies currently used at home:      Employment/Financial:  Employment Status:          Financial Concerns: No concerns identified           Lifestyle & Psychosocial Needs:  Lifestyle     Physical activity     Days per week: Not on file     Minutes per session: Not on file     Stress: Only a little     Social Needs     Financial resource strain: Not hard at all     Food insecurity     Worry: Never true     Inability: Never true     Transportation needs     Medical: No     Non-medical: No     Socioeconomic History     Marital status:      Spouse name: Not on file     Number of children: 3     Years of education: Not on file     Highest education  level: High school graduate   Occupational History     Employer: DISABLED     Comment: Previous , disabled since ~2013   Relationships     Social connections     Talks on phone: Once a week     Gets together: Once a week     Attends Muslim service: More than 4 times per year     Active member of club or organization: No     Attends meetings of clubs or organizations: Never     Relationship status:      Intimate partner violence     Fear of current or ex partner: No     Emotionally abused: No     Physically abused: No     Forced sexual activity: No     Tobacco Use     Smoking status: Never Smoker     Smokeless tobacco: Never Used   Substance and Sexual Activity     Alcohol use: No     Comment: Stopped drinking alcohol ~2009     Drug use: No     Sexual activity: Not Currently       Functional Status:  Prior to admission patient needed assistance: no       Mental Health Status:  n/a        Chemical Dependency Status:  n/a    Values/Beliefs:  Spiritual, Cultural Beliefs, Bahai Practices, Values that affect care: yes               Additional Information:  CM consulted for discharge planning.  Patient was admitted with neuro symptoms which have resolved.  Pt indicates he is back to baseline.  He is under observation status.  Met with patient.  He states nothing has changed since his previous admission.  He was still receiving RN visits through [facility].  He denies any discharge concerns at this time.    Notified [facility] that patient will need INR on Monday 6/7/21.      Care Management Discharge Note    Discharge Date: 06/04/21       Discharge Disposition: Home, Home Care    Discharge Services:      Discharge DME:      Discharge Transportation: family or friend will provide    Patient/family educated on Medicare website which has current facility and service quality ratings: no    Education Provided on the Discharge Plan: yes  Persons Notified of Discharge Plans: home care  Patient/Family  in Agreement with the Plan: yes    Handoff Referral Completed: Yes     Addendum @ 7827:  Spoke to pt's spouse Paul.  She has been trying to set up Neurology follow up at [facility] but has been unsuccessful.  Neuro team here has placed a Neurology follow up for Dr[facility].  Provided the clinic phone number on AVS and left message for stroke team care coordinator to follow up.      Nolvia Perez RN

## 2021-06-04 NOTE — PROGRESS NOTES
[facility]    Stroke Progress Note    Interval Events  No major events over night. I would note that the patient had an event in 1/2021 when his wife heard a sound upstairs and went to check on him and found him having right arm shaking and unresponsiveness then he became confused after that. General neurology saw him in the hospital after that and they recommended general neurology follow up, outpatient EEG and slow up titration of lamotrigine starting from 25 mg, but he did do any of that except he stayed on the small dose of lamotrigine.     Impression  Ischemic Stroke due to embolic stroke of undetermined source (ESUS) vs recrudescence of recent right thalamic infarct    Plan  - Neurochecks and Vital Signs every 4 hours   - Permissive HTN; goal SBP < 220 mmHg  - Daily aspirin 81 mg for secondary stroke prevention, continue Coumadin   - Statin: PTA statin  - MRI Brain with and without contrast  - Telemetry, EKG  - Bedside Glucose Monitoring  - Troponin x 3  - PT/OT/SLP  - Make lamotrigine 25-25 mg   - General neurology follow up (order placed for you)    The patient was discussed with Stroke Fellow, Dr. Simmons.  The Stroke Staff is Dr. Maxwell.    Nithin Bravo MD  Neurology Resident    ______________________________________________________    Medications   Home Meds  Prior to Admission medications    Medication Sig Start Date End Date Taking? Authorizing Provider   acetaminophen (TYLENOL) 500 MG tablet Take 1-2 tablets (500-1,000 mg) by mouth every 8 hours as needed for mild pain 2/7/21  Yes Aureloi Etienne,    aspirin (ASA) 81 MG EC tablet Take 1 tablet (81 mg) by mouth daily 4/27/21  Yes Jc Friedman MD   atorvastatin (LIPITOR) 80 MG tablet Take 1 tablet (80 mg) by mouth every evening 5/19/21  Yes Jamshid Weber PA   cetirizine (ZYRTEC) 10 MG tablet Take 10 mg by mouth every morning   Yes Unknown, Entered By History   cyclobenzaprine (FLEXERIL) 10 MG  tablet TAKE ONE TABLET BY MOUTH THREE TIMES DAILY AS NEEDED FOR MUSCLE SPASM.  Patient taking differently: Take 10 mg by mouth 3 times daily  3/9/21  Yes Obdulio Ingram MD   diazepam (VALIUM) 5 MG tablet TAKE ONE TABLET BY MOUTH EVERY EIGHT HOURS AS NEEDED FOR ANXIETY OR MUSCLE SPASM 4/28/21  Yes Obdulio Ingram MD   donepezil (ARICEPT) 10 MG tablet Take 2 tablets (20 mg) by mouth At Bedtime. Stop if diarrhea or nausea develop 5/30/21  Yes Obdulio nIgram MD   folic acid (FOLVITE) 1 MG tablet Take 5 tablets (5 mg) by mouth daily 2/22/21  Yes Jody Tanner MD   guanFACINE (TENEX) 1 MG tablet Take 1 tablet (1 mg) by mouth At Bedtime 4/5/21  Yes Savi Ken CNP   lamoTRIgine (LAMICTAL) 25 MG tablet Take 1 tablet (25 mg) by mouth daily 5/30/21  Yes Obdulio Ingram MD   levothyroxine (SYNTHROID/LEVOTHROID) 150 MCG tablet Take 1 tablet (150 mcg) by mouth daily  Patient taking differently: Take 150 mcg by mouth every evening  12/11/20  Yes Obdulio Ingram MD   liothyronine (CYTOMEL) 25 MCG tablet Take 1 tablet (25 mcg) by mouth daily  Patient taking differently: Take 25 mcg by mouth every evening  2/22/21  Yes Jody Tanner MD   memantine (NAMENDA) 10 MG tablet Take 1 tablet (10 mg) by mouth 2 times daily 5/6/21  Yes Obdulio Ingram MD   mirabegron (MYRBETRIQ) 50 MG 24 hr tablet Take 1 tablet (50 mg) by mouth daily  Patient taking differently: Take 50 mg by mouth every evening  12/31/20  Yes Obdulio Ingram MD   potassium chloride ER (K-TAB) 20 MEQ CR tablet Take 1 tablet (20 mEq) by mouth daily as needed (Take one tablet for each furosemide tablet that is taken) 4/15/21  Yes Obdulio Ingram MD   pregabalin (LYRICA) 100 MG capsule Take 1 capsule (100 mg) by mouth 3 times daily Do not take if sleepy/sedated. 4/1/21  Yes Savi Ken CNP   propafenone (RYTHMOL) 150 MG TABS tablet Take 1 tablet (150 mg) by mouth every 8 hours 12/10/20  Yes Obdulio Ingram MD senna-sheryl (SENOKOT-S;PERICOLACE)  8.6-50 MG per tablet Take 1 tablet by mouth 2 times daily as needed for constipation 8/2/18  Yes Obdulio Ingram MD   venlafaxine (EFFEXOR-XR) 150 MG 24 hr capsule Take 2 capsules (300 mg) by mouth daily  Patient taking differently: Take 300 mg by mouth daily Takes in the afternoon. 1/20/21  Yes Obdulio Ingram MD   warfarin ANTICOAGULANT (COUMADIN) 2.5 MG tablet Take 5 mg by mouth three times a week Sun, Wed, Fri   Yes Unknown, Entered By History   azelastine (ASTELIN) 0.1 % nasal spray USE 2 SPRAYS IN NOSTRIL 2 TIMES DAILY AS NEEDED. 5/29/20   Obdulio Ingrma MD   furosemide (LASIX) 20 MG tablet Take 1-2 tablets (20-40 mg) by mouth daily as needed (leg swelling) 4/15/21   Obdulio Ingram MD   warfarin ANTICOAGULANT (COUMADIN) 2.5 MG tablet Take 7.5 mg by mouth four times a week Mon, Tues, Thurs, Sat    Unknown, Entered By History       Scheduled Meds    aspirin  81 mg Oral Daily     atorvastatin  80 mg Oral QPM     cetirizine  10 mg Oral QAM     cyclobenzaprine  10 mg Oral TID     donepezil  20 mg Oral At Bedtime     guanFACINE  1 mg Oral At Bedtime     lamoTRIgine  25 mg Oral Daily     levothyroxine  150 mcg Oral QPM     liothyronine  25 mcg Oral QPM     memantine  10 mg Oral BID     mirabegron  50 mg Oral QPM     pregabalin  100 mg Oral TID     propafenone  150 mg Oral Q8H     sodium chloride (PF)  3 mL Intracatheter Q8H     venlafaxine  300 mg Oral QPM       Infusion Meds    - MEDICATION INSTRUCTIONS -       - MEDICATION INSTRUCTIONS -       Warfarin Therapy Reminder         PRN Meds  acetaminophen, acetaminophen, diazepam, lidocaine 4%, lidocaine (buffered or not buffered), - MEDICATION INSTRUCTIONS -, - MEDICATION INSTRUCTIONS -, melatonin, ondansetron **OR** ondansetron, ondansetron **OR** ondansetron, prochlorperazine **OR** prochlorperazine **OR** prochlorperazine, sodium chloride (PF), Warfarin Therapy Reminder       PHYSICAL EXAMINATION  Temp:  [97.4  F (36.3  C)-98.1  F (36.7  C)] 97.8  F (36.6   C)  Pulse:  [70-82] 73  Resp:  [10-25] 18  BP: (122-147)/() 144/93  SpO2:  [93 %-100 %] 95 %     Mental Status:  alert, oriented x 3, follows commands, speech clear and fluent, naming and repetition normal  Cranial Nerves:  visual fields intact, PERRL, facial movements symmetric, hearing not formally tested but intact to conversation, palate elevation symmetric and uvula midline, no dysarthria, shoulder shrug strong bilaterally, tongue protrusion midline, limited right eye abudction at baseline   Motor:  normal muscle tone and bulk, no abnormal movements, able to move all limbs spontaneously, no pronator drift, left hip flexion 4+/5   Reflexes:  no clonus  Sensory:  normal sensation on both sides  Coordination:  normal finger-to-nose and heel-to-shin bilaterally without dysmetria, rapid alternating movements symmetric  Station/Gait:  deferred       Stroke Scales  [employer] Stroke Scale  Exam Interval: 24 hours post onset of symptom +/- 20 minutes   Score    Level of consciousness: (0)   Alert, keenly responsive    LOC questions: (0)   Answers both questions correctly    LOC commands: (0)   Performs both tasks correctly    Best gaze: (0)   Normal    Visual: (0)   No visual loss    Facial palsy: (0)   Normal symmetrical movements    Motor arm (left): (0)   No drift    Motor arm (right): (0)   No drift    Motor leg (left): (0)   No drift    Motor leg (right): (0)   No drift    Limb ataxia: (0)   Absent    Sensory: (0)   Normal- no sensory loss    Best language: (0)   Normal- no aphasia    Dysarthria: (0)   Normal    Extinction and inattention: (0)   No abnormality        Total Score:  0         Imaging  I personally reviewed all imaging; relevant findings per HPI.     Lab Results Data   CBC  Recent Labs   Lab 06/03/21  1026   WBC 6.0   RBC 4.77   HGB 13.9   HCT 43.4        Basic Metabolic Panel    Recent Labs   Lab 06/03/21  1514 06/03/21  1026   NA  --  142   POTASSIUM 4.3 4.2    CHLORIDE  --  109   CO2  --  33*   BUN  --  20   CR  --  1.06   GLC  --  89   CATERINA  --  8.5     Liver Panel  No results for input(s): PROTTOTAL, ALBUMIN, BILITOTAL, ALKPHOS, AST, ALT, BILIDIRECT in the last 168 hours.  INR    Recent Labs   Lab Test 06/03/21  1026 05/28/21 05/21/21   INR 3.82* 3.3* 2.9*      Lipid Profile    Recent Labs   Lab Test 04/18/21  1759 01/23/21  0748 12/11/20  1545 12/24/14  0846 12/24/14  0846 12/17/13  1012   CHOL 254* 135 226*   < > 166 170   HDL 43 44 39*   < > 41 30*   * 65 149*   < > 78 74   TRIG 233* 130 192*   < > 234* 331*   CHOLHDLRATIO  --   --   --   --  4.0 5.7*    < > = values in this interval not displayed.     A1C    Recent Labs   Lab Test 01/23/21  0747   A1C 4.9     Troponin I    Recent Labs   Lab 06/03/21  1514 06/03/21  1026   TROPI <0.015 <0.015

## 2021-06-04 NOTE — PLAN OF CARE
A+Ox4 VSS on room air. Neuros intact except for baseline numbness and tingling in BLE. LS clear. Voiding adequately. Bowels active, flatus+. Up w/ 1. Denies pain. Tolerating diet.

## 2021-06-04 NOTE — PLAN OF CARE
Patient here for rule-out stroke. A&O x 4. Neuros intact other than baseline tingling in RUE and baseline decreased sensation in LLE. VSS on room air. Tele is NSR with PAC noted. Regular diet and eating well. Voiding spontaneously in urinal. No BM this shift. Plan to discharge to home this afternoon.

## 2021-06-04 NOTE — PROGRESS NOTES
[facility]  Patient is currently open to home care services with [facility]. The patient is currently receiving Skilled Nursing services.  Patient's  and home health team have been notified that patient is under OBSERVATION STATUS. [facility] Liaison will continue to follow patient during stay. If patient is admitted to inpatient status please provide orders to resume home care at time of discharge if appropriate.

## 2021-06-04 NOTE — PROGRESS NOTES
ANTICOAGULATION  MANAGEMENT: Discharge Review    Todd S Aschoff chart reviewed for anticoagulation continuity of care    Hospital Admission on 06/03-06/04 for weakness.    Discharge disposition: Home with Home Care    Results:    Recent labs: (last 7 days)     06/03/21  1026 06/04/21  1107   INR 3.82* 3.72*   [id number] 17*  --      Anticoagulation inpatient management:     2mg 06/03    Anticoagulation discharge instructions:     Warfarin dosing: pt was instructed 5mg Fri, Sat, Sun, INR recheck 06/07   Bridging: No   INR goal change: No      Medication changes affecting anticoagulation: No    Additional factors affecting anticoagulation: No    Plan     No adjustment to anticoagulation plan needed  Agree with dosing adjustment on discharge     Left DVM for HC SHYANN Mendez ([phone]) to request INR 06/07    Recommended follow up is scheduled    Anticoagulation Calendar updated    Oscar Briscoe RN

## 2021-06-04 NOTE — PLAN OF CARE
PT: Eval orders received, chart reviewed. Per discussion with OT pt has returned to baseline no skilled PT needs. Will complete orders

## 2021-06-04 NOTE — PROVIDER NOTIFICATION
Text page sent to Dr. Burnett - pharmacy ordered 1 mg warfarin this evening. Discharge orders state take 5 mg warfarin. Requested clarification. Return call received from Dr. Burnett - patient should take 5 mg warfarin this evening as ordered in discharge instructions.

## 2021-06-04 NOTE — PROGRESS NOTES
06/04/21 1122   Quick Adds   Type of Visit Initial Occupational Therapy Evaluation   Living Environment   People in home spouse;child(morgan), adult  (daughter and her  and 4 grandchildren, 5, 6, 11 and13)   Current Living Arrangements house  (split level)   Home Accessibility stairs to enter home;stairs within home   Number of Stairs, Main Entrance   (14)   Number of Stairs, Within Home, Primary   (14)   Transportation Anticipated family or friend will provide   Living Environment Comments hasn't driven due to seizure. pt is retired.    Self-Care   Regular Exercise Yes   Activity/Exercise Type   (PT exercises. )   Equipment Currently Used at Home cane, straight;walker, standard;raised toilet seat  (hasn't been using AD)   Activity/Exercise/Self-Care Comment walk in shower   Disability/Function   Fall history within last six months yes   Number of times patient has fallen within last six months 1   General Information   Onset of Illness/Injury or Date of Surgery 06/03/21   Referring Physician Jamshid Weber PA   Patient/Family Therapy Goal Statement (OT) home   Additional Occupational Profile Info/Pertinent History of Current Problem Todd S Aschoff is a 65 year old male with PMH Aortic valve Dz s/p mechanical valve replacement on chronic coumadin, Atrial fib, HLP, Hypothyroidism, Obesity, Dementia, MDD with anxiety, overactive bladder and possible seizure disorder with recent history of CVA (1/2021) and (4/2021).  Patient was recently admitted 5/17-5/19/21 for symptoms of numbness to upper and lower lips as well as tingling left thumb, index, and middle finger.  CVA work-up was performed at that time, and brain MRI showed new tiny infarct in the right thalamus that was not present on 4/18/2021.  Patient presents once again with the same symptoms as above that started acutely on 6/3 around 10 AM.  CT and MRI (-) for infarct per CT and MRI.    Existing Precautions/Restrictions no known  "precautions/restrictions   Cognitive Status Examination   Orientation Status orientation to person, place and time   Affect/Mental Status (Cognitive) WFL   Follows Commands WFL   Visual Perception   Visual Impairment/Limitations corrective lenses full-time   Impact of Vision Impairment on Function (Vision) no changes in vision   Sensory   Sensory Comments L foot neuropathy.    Pain Assessment   Patient Currently in Pain No   Range of Motion Comprehensive   Comment, General Range of Motion B UE AROM WFL, bursitis in L shoulder, but able has good shoulder flexion   Strength Comprehensive (MMT)   Comment, General Manual Muscle Testing (MMT) Assessment B UE and LE strength WNL, symmetrical   Hand Strength   Hand Strength Comments good B hand strength pt is R hand dominant   Muscle Tone Assessment   Muscle Tone Quick Adds No deficits were identified   Coordination   Upper Extremity Coordination No deficits were identified   Bed Mobility   Supine-Sit [place] (Bed Mobility) independent   Sit-Supine [place] (Bed Mobility) independent   Transfer Skill: Bed to Chair/Chair to Bed   Bed-Chair [place] (Transfers) independent   Sit-Stand Transfer   Sit-Stand [place] (Transfers) independent   Toilet Transfer   [place] Level (Toilet Transfer) independent   Lower Body Dressing Assessment/Training   [place] Level (Lower Body Dressing) independent   Instrumental Activities of Daily Living (IADL)   Previous Responsibilities meal prep  (pt's son inlaw A\"s with med mgmt, pt doesn't drive postsseiz)   Clinical Impression   Criteria for Skilled Therapeutic Interventions Met (OT) no;no problems identified which require skilled intervention   Therapy Frequency (OT) 1x eval   Risk & Benefits of therapy have been explained evaluation/treatment results reviewed;care plan/treatment goals reviewed;risks/benefits reviewed;current/potential barriers reviewed;participants voiced agreement with care plan;participants " included;patient   Comment-Clinical Impression Pt reports all symptoms of CVA resolved L hand numbness and lips. pt back to baseline with ADl'sa nd functional mobility. pt completed flight of stairs without difficultyusing railing. OT not indicated and PT not recomemnded.    OT Discharge Planning    OT Discharge Recommendation (DC Rec) home;Home with assist   OT Rationale for DC Rec Pt reports all symptoms of CVA resolved L hand numbness and lips. pt back to baseline with ADl'sa nd functional mobility. pt completed flight of stairs without difficultyusing railing. OT not indicated and PT not recomemnded as pt at [facility]. COnt'd family A with transportation and med mgmt, etc and pt to cont with PT exercises from previous therapy.   Therapy Certification   Start of Care Date 06/04/21   Certification date from 06/04/21   Certification date to 06/04/21   Medical Diagnosis numbness and tingling of his mouth and lips and tongue, along with numbness of the left thumb, index, and middle finger of his left hand.     Total Evaluation Time (Minutes)   Total Evaluation Time (Minutes) 25

## 2021-06-04 NOTE — DISCHARGE SUMMARY
[facility]  Discharge Summary  Hospitalist    Date of Admission:  6/3/2021  Date of Discharge:  6/4/2021  Provider:  Ronaldo Burnett DO, FHM    Discharge Diagnoses   1.  Probable recrudescence of recent right thalamic infarct    Other medical issues:  Past Medical History:   Diagnosis Date     Alcohol dependence (H)      Allergy, unspecified not elsewhere classified     seasonal     Antiplatelet or antithrombotic long-term use     coumadin/lovenox     Aortic valve prosthesis present      Atrial fibrillation (H)      Blood transfusion     after heart surg 1992     BPH (benign prostatic hypertrophy)      Chronic infection     low back wound incision not healing      Chronic pain     lower back and right leg and left leg     Coagulation disorder (H)     on blood thinners     Dissection of aorta, thoracic (H) 1992    [facility] aotic valve + arch graft 1992     Headache(784.0)      Heart murmur     aortic valve replaced and arch     History of spinal cord injury      Low back pain      Major depression      Mixed hyperlipidemia      Numbness and tingling     right leg post surg rightt hip/ also left leg since surg     OA (osteoarthritis)     hips     Obesity, unspecified      Prostate infection      Sciatica 2002    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     Unspecified hypothyroidism        History of Present Illness   Todd S Aschoff is an 65 year old male who presented with recurrence of neurologic complaints similar to his recent CVA.  Please see the admission history and physical for full details.    Hospital Course   Todd S Aschoff was admitted on 6/3/2021.  The following problems were addressed during his hospitalization:    Patient was recently admitted 5/17-5/19/21 for symptoms of numbness to upper and lower lips as well as tingling left thumb, index, and middle finger.  CVA work-up was performed at that time and brain MRI showed new tiny infarct in the right thalamus that was not  present on 4/18/2021.  Patient presents once again with the same symptoms as above that started acutely on 6/3 around 10 AM.  Patient presented to the ER, code stroke was initiated, and CT/CTA is initially negative.  He quickly improved back to baseline and no further acute neuro changes were noted.  He denied infectious complaints including new respiratory or urinary complaints.  An MRI was obtained that demonstrated no new CVA's.  Neurology saw him and felt this was perhaps a recrudescence of recent right thalamic infarct.  As he was back to his recent baseline and felt well he was discharged.  Incidentally noted was that he was supposed to be on a twice daily dose of lamotrigine + have followed up with neurology for an EEG from his early 2021 stay.  While this episode not felt a seizure, it was felt important he remain on lamotrigine and follow-up.      Other issues:  - Mr. Aschoff's INR was somewhat high on presentation.  I obtained a chromogenic Factor X which also correlated well to the INR.  His warfarin dose was reduced.  On day of discharge the patient took it upon himself to order a spinach omelet/vegetables to try to bring down his INR further.  Given that and an INR already nearing the top end of his target range I discharged him on his usual home dose of warfarin with the slight change of changing one of his 7.5 mg weekend doses to 5 mg doses.  I requested an INR Monday with results to his warfarin.   Home care RN was also resumed.  He did not have home therapy by report and appeared at baseline currently.   I also updated his wife at his request and all her questions were answered.    Significant Results and Procedures   See below    Pending Results     Unresulted Labs Ordered in the Past 30 Days of this Admission     No orders found for last 31 day(s).          Code Status   Full Code       Primary Care Physician   Obdulio Ingram MD    Blood pressure (!) 149/97, pulse 79, temperature 97.8  F (36.6   C), temperature source Oral, resp. rate 18, weight 141.5 kg (311 lb 15.2 oz), SpO2 94 %.    Alert, oriented, heart regular, lungs clear, no new focal deficits noted day of discharge.    Discharge Disposition   Discharged to home    Consultations This Hospital Stay   PATIENT LEARNING CENTER IP CONSULT  SWALLOW EVAL SPEECH PATH AT BEDSIDE IP CONSULT  SPEECH LANGUAGE PATH ADULT IP CONSULT  PHARMACY IP CONSULT  PHARMACY IP CONSULT  PHARMACY IP CONSULT  PHYSICAL THERAPY ADULT IP CONSULT  OCCUPATIONAL THERAPY ADULT IP CONSULT  CARE MANAGEMENT / SOCIAL WORK IP CONSULT  NEUROLOGY IP CONSULT  PHARMACY TO DOSE WARFARIN  SMOKING CESSATION PROGRAM IP CONSULT    Time Spent on this Encounter   IRonaldo DO, personally saw the patient today and spent greater than 30 minutes discharging this patient.    Discharge Orders      NEUROLOGY ADULT REFERRAL      Home care nursing referral      Reason for your hospital stay    Neurologic change     Follow-up and recommended labs and tests     1.  Follow-up with outpatient neurology (referral sent, RN to please also provide clinic phone #).  Recommend EEG with follow-up.  2.  Follow-up with primary provider 1 week  3.  Recommend INR Monday with results to your warfarin clinic  4.     Activity    Your activity upon discharge: activity as tolerated     Diet    Follow this diet upon discharge: Resume prior Regular diet     Discharge Medications   Current Discharge Medication List      CONTINUE these medications which have CHANGED    Details   lamoTRIgine (LAMICTAL) 25 MG tablet Take 1 tablet (25 mg) by mouth 2 times daily  Qty: 90 tablet, Refills: 0    Associated Diagnoses: Generalized muscle weakness      !! warfarin ANTICOAGULANT (COUMADIN) 2.5 MG tablet Take 3 tablets (7.5 mg) by mouth three times a week Mon, Tues, Thurs    Associated Diagnoses: Cerebrovascular accident (CVA), unspecified mechanism (H)      !! warfarin ANTICOAGULANT (COUMADIN) 2.5 MG tablet Take 2 tablets (5 mg)  by mouth four times a week Sat, Sun, Wed, Fri    Associated Diagnoses: Cerebrovascular accident (CVA), unspecified mechanism (H)       !! - Potential duplicate medications found. Please discuss with provider.      CONTINUE these medications which have NOT CHANGED    Details   acetaminophen (TYLENOL) 500 MG tablet Take 1-2 tablets (500-1,000 mg) by mouth every 8 hours as needed for mild pain    Associated Diagnoses: Wound of left lower extremity, initial encounter      aspirin (ASA) 81 MG EC tablet Take 1 tablet (81 mg) by mouth daily  Qty: 90 tablet, Refills: 1    Associated Diagnoses: Cerebrovascular accident (CVA), unspecified mechanism (H)      atorvastatin (LIPITOR) 80 MG tablet Take 1 tablet (80 mg) by mouth every evening  Qty: 30 tablet, Refills: 0    Associated Diagnoses: Cerebrovascular accident (CVA), unspecified mechanism (H); Hyperlipidemia LDL goal <130      cetirizine (ZYRTEC) 10 MG tablet Take 10 mg by mouth every morning      cyclobenzaprine (FLEXERIL) 10 MG tablet TAKE ONE TABLET BY MOUTH THREE TIMES DAILY AS NEEDED FOR MUSCLE SPASM.  Qty: 270 tablet, Refills: 0    Associated Diagnoses: Muscle spasm      diazepam (VALIUM) 5 MG tablet TAKE ONE TABLET BY MOUTH EVERY EIGHT HOURS AS NEEDED FOR ANXIETY OR MUSCLE SPASM  Qty: 60 tablet, Refills: 0    Associated Diagnoses: Back muscle spasm; Controlled substance agreement signed      donepezil (ARICEPT) 10 MG tablet Take 2 tablets (20 mg) by mouth At Bedtime. Stop if diarrhea or nausea develop  Qty: 180 tablet, Refills: 0    Associated Diagnoses: Memory difficulties      folic acid (FOLVITE) 1 MG tablet Take 5 tablets (5 mg) by mouth daily  Qty: 150 tablet, Refills: 3    Associated Diagnoses: Major depressive disorder, recurrent episode, in full remission (H)      guanFACINE (TENEX) 1 MG tablet Take 1 tablet (1 mg) by mouth At Bedtime  Qty: 90 tablet, Refills: 0    Associated Diagnoses: Major depressive disorder, recurrent episode, moderate (H)       levothyroxine (SYNTHROID/LEVOTHROID) 150 MCG tablet Take 1 tablet (150 mcg) by mouth daily  Qty: 90 tablet, Refills: 3    Associated Diagnoses: Acquired hypothyroidism      liothyronine (CYTOMEL) 25 MCG tablet Take 1 tablet (25 mcg) by mouth daily  Qty: 30 tablet, Refills: 3    Associated Diagnoses: Major depressive disorder, recurrent episode, in full remission (H)      memantine (NAMENDA) 10 MG tablet Take 1 tablet (10 mg) by mouth 2 times daily  Qty: 180 tablet, Refills: 0    Associated Diagnoses: Mild cognitive impairment      mirabegron (MYRBETRIQ) 50 MG 24 hr tablet Take 1 tablet (50 mg) by mouth daily  Qty: 90 tablet, Refills: 3    Associated Diagnoses: Overactive bladder      potassium chloride ER (K-TAB) 20 MEQ CR tablet Take 1 tablet (20 mEq) by mouth daily as needed (Take one tablet for each furosemide tablet that is taken)  Qty: 60 tablet, Refills: 0    Associated Diagnoses: Bilateral leg edema      pregabalin (LYRICA) 100 MG capsule Take 1 capsule (100 mg) by mouth 3 times daily Do not take if sleepy/sedated.  Qty: 270 capsule, Refills: 0    Associated Diagnoses: Chronic bilateral low back pain without sciatica      propafenone (RYTHMOL) 150 MG TABS tablet Take 1 tablet (150 mg) by mouth every 8 hours  Qty: 270 tablet, Refills: 0    Associated Diagnoses: Chronic atrial fibrillation (H)      senna-docusate (SENOKOT-S;PERICOLACE) 8.6-50 MG per tablet Take 1 tablet by mouth 2 times daily as needed for constipation  Qty: 60 tablet, Refills: 11    Associated Diagnoses: Constipation, unspecified constipation type      venlafaxine (EFFEXOR-XR) 150 MG 24 hr capsule Take 2 capsules (300 mg) by mouth daily  Qty: 180 capsule, Refills: 3    Comments: Appeal was approved.  Associated Diagnoses: Major depressive disorder, recurrent episode, moderate (H)      azelastine (ASTELIN) 0.1 % nasal spray USE 2 SPRAYS IN NOSTRIL 2 TIMES DAILY AS NEEDED.  Qty: 30 mL, Refills: 11    Associated Diagnoses: Rash      furosemide  (LASIX) 20 MG tablet Take 1-2 tablets (20-40 mg) by mouth daily as needed (leg swelling)  Qty: 60 tablet, Refills: 0    Associated Diagnoses: Bilateral leg edema             Allergies   Allergies   Allergen Reactions     Gabapentin      Severe behavioral disturbances     Data   Recent Labs   Lab 06/04/21  1107 06/03/21  1026   WBC 6.2 6.0   HGB 13.9 13.9   HCT 42.2 43.4   MCV 91 91    249     Recent Labs   Lab 06/04/21  1107 06/03/21  1514 06/03/21  1026     --  142   POTASSIUM 4.3 4.3 4.2   CHLORIDE 106  --  109   CO2 33*  --  33*   ANIONGAP 2*  --  <1*   *  --  89   BUN 16  --  20   CR 1.02  --  1.06   GFRESTIMATED 77  --  74   GFRESTBLACK 89  --  85   CATERINA 8.8  --  8.5     Recent Labs   Lab 06/03/21  1514 06/03/21  1026   TROPI <0.015 <0.015     Results for orders placed or performed during the hospital encounter of 06/03/21   CT Head w/o Contrast    Narrative    CT SCAN OF THE HEAD WITHOUT CONTRAST Andra 3, 2021 10:40 AM     HISTORY: Code stroke.    TECHNIQUE: Axial images of the head and coronal reformations without  IV contrast material. Radiation dose for this scan was reduced using  automated exposure control, adjustment of the mA and/or kV according  to patient size, or iterative reconstruction technique.    COMPARISON: Brain MR 5/17/2021. Head CT 5/17/2021.    FINDINGS: No evidence of acute intracranial hemorrhage. No mass effect  or midline shift. Recently visualized infarct in the right thalamus on  brain MR is not appreciated on this head CT. Chronic appearing infarct  in the right occipital lobe appears similar to prior. Ventricular size  is within normal limits without evidence of hydrocephalus. Chronic  appearing infarct in the right cerebellum.    The visualized portions of the sinuses and mastoids appear normal. The  bony calvarium and bones of the skull base appear intact.       Impression    IMPRESSION:     1. No evidence of acute intracranial hemorrhage, mass, or  herniation.  2. Recent infarct in the right thalamus on brain MR 5/17/2021 is not  appreciated by head CT.  3. Chronic appearing infarct in the right occipital lobe and right  cerebellum.      LUIS WINTERS MD   CTA Head Neck with Contrast    Narrative    CT ANGIOGRAM OF THE HEAD AND NECK WITH CONTRAST  6/3/2021 10:47 AM     HISTORY: Code Stroke.    TECHNIQUE:  CT angiography with an injection of 70 mL [id number]-370 IV  with scans through the head and neck. Images were transferred to a  separate 3-D workstation where multiplanar reformations and 3-D images  were created. Estimates of carotid stenoses are made relative to the  distal internal carotid artery diameters except as noted. Radiation  dose for this scan was reduced using automated exposure control,  adjustment of the mA and/or kV according to patient size, or iterative  reconstruction technique.     COMPARISON: CTA 5/17/2021 and 4/18/2021.     CT HEAD FINDINGS: No contrast enhancing lesions. Cerebral blood flow  is grossly normal.     CT ANGIOGRAM HEAD FINDINGS:  The major intracranial arteries including  the proximal branches of the anterior cerebral, middle cerebral, and  posterior cerebral arteries appear patent without vascular cutoff. No  aneurysm identified. No significant stenosis. Venous circulation is  unremarkable.     CT ANGIOGRAM NECK FINDINGS:   Aortic graft partially visualized in the ascending aorta. This is not  well evaluated on this study.     Right carotid artery: The right common and internal carotid arteries  are patent. Mild atherosclerotic disease at the carotid bifurcation  and proximal internal carotid artery without stenosis.     Left carotid artery: The left common and internal carotid arteries are  patent. Mild atherosclerotic disease at the carotid bifurcation and  proximal internal carotid artery without stenosis.     Vertebral arteries: Vertebral arteries are patent without evidence of  dissection. No significant stenosis. Left  vertebral artery arises  directly from the aortic arch, a normal anatomic variant.    Other findings: Anterior spinal fusion hardware from C4 to C6. Marked  degenerative disc changes at C3-C4.      Impression    IMPRESSION:   1. Patent arteries in the neck without evidence of dissection. Mild  atherosclerotic disease in the carotid arteries bilaterally without  significant stenosis by NASCET criteria.  2. Patent proximal major intracranial arteries without vascular  cutoff. No significant stenosis. No aneurysm identified.    Results discussed with Kofi Zuniga at 10:57 AM on 6/3/2021.     LUIS WINTERS MD   MR Brain w/o & w Contrast    Narrative    MRI BRAIN WITHOUT AND WITH CONTRAST  6/3/2021 7:04 PM    HISTORY:  Neurologic deficit, acute, stroke suspected. Acute ischemic  stroke.    TECHNIQUE:  Multiplanar, multisequence MRI of the brain without and  with 14mL Gadavist.    COMPARISON: Brain MRI 5/17/2021    FINDINGS:  No evidence of acute ischemia or hemorrhage. Old infarcts are present  involving the right occipital lobe with evidence of laminar necrosis,  left parietal occipital parenchyma, right thalamus, and bilateral  cerebellum.    Background of mild volume loss is present with white matter T2  hyperintensities which likely represent chronic small vessel ischemic  change. Few scattered nonspecific microhemorrhages near the gray-white  matter junctions of the bilateral cerebral hemispheres. Major  intracranial flow voids are maintained.    Marrow signal is within normal limits. The visualized tympanic  cavities, mastoid cavities, and paraspinous sinuses are unremarkable.  Right lens replacement.      Impression    IMPRESSION:    1. No evidence of acute ischemia or hemorrhage.  2. Multiple old infarcts.    BRADLEY ROMO MD     *Note: Due to a large number of results and/or encounters for the requested time period, some results have not been displayed. A complete set of results can be found in Results  Review.

## 2021-06-04 NOTE — PLAN OF CARE
Pt admitted for stroke work-up. MRI negative for new stroke. Alert and oriented x4. VSS on RA. Neuros at baseline with neuropathy/decreased sensation in bilateral feet, otherwise intact. Regular diet. Ambulating independently in room. Reviewed written discharge instructions with pt and his wife (by phone), all questions answered. Plan to discharge home this afternoon with family providing transportation.

## 2021-06-04 NOTE — DISCHARGE INSTRUCTIONS
HOMECARE NOTE:   You will continue to receive skilled nursing service through [facility].  They are aware that you need an INR on Monday 6/7.    You have been referred to Dr. Akbar for Neurology follow up.  You will be contacted to schedule an appointment.  If you do not receive a call by early next week, please call the [facility] at [phone].

## 2021-06-04 NOTE — PHARMACY
Pharmacy Consult to evaluate for medication related stroke core measures    Todd S Aschoff, 65 year old male admitted for CVA on 6/3/2021.    Thrombolytic was not given because of Not given due to minor/isolated/quickly resolving symptoms and warfarin dose within 48 hours/ INR > 1.7    VTE Prophylaxis warfarin given on 6/3/2021 as appropriate prior to end of hospital day 2.  Pt also has a PCD order started on 6/3/2021.    Antithrombotic: warfarin started on 6/3/2021, as appropriate by end of hospital day 2. Continue antithrombotic therapy on discharge to meet quality measures, unless contraindicated.    Anticoagulation if history of A-fib/flutter: Patient on warfarin; continue anticoagulation on discharge to meet quality measures, unless contraindicated.    LDL Cholesterol Calculated   Date Value Ref Range Status   04/18/2021 164 (H) <100 mg/dL Final     Comment:     Above desirable:  100-129 mg/dl  Borderline High:  130-159 mg/dL  High:             160-189 mg/dL  Very high:       >189 mg/dl         Patient's home statin, Lipitor (atorvastatin) restarted; continue statin on discharge to meet quality measures, unless contraindicated.     Recommendations: None at this time    Thank you for the consult.    Batool Kat, PharmD  6/4/2021 11:02 AM

## 2021-06-04 NOTE — PROGRESS NOTES
[facility]      OUTPATIENT OCCUPATIONAL THERAPY  EVALUATION  PLAN OF TREATMENT FOR OUTPATIENT REHABILITATION  (COMPLETE FOR INITIAL CLAIMS ONLY)  Patient's Last Name, First Name, M.I.  YOB: 1955  Aschoff,Todd S                          Provider's Name  [facility] Medical Record No.  [id number]                               Onset Date:  06/03/21   Start of Care Date:  06/04/21     Type:     ___PT   _X_OT   ___SLP Medical Diagnosis:  numbness and tingling of his mouth and lips and tongue, along with numbness of the left thumb, index, and middle finger of his left hand.                          OT Diagnosis:      Visits from SOC:  1   _________________________________________________________________________________  Plan of Treatment/Functional Goals    Planned Interventions:     Goals: See Occupational Therapy Goals on Care Plan in [facility] electronic health record.    Therapy Frequency: 1x eval  Predicted Duration of Therapy Intervention:    _________________________________________________________________________________    I CERTIFY THE NEED FOR THESE SERVICES FURNISHED UNDER        THIS PLAN OF TREATMENT AND WHILE UNDER MY CARE .             Physician Signature               Date    X_____________________________________________________                      Certification date from: 06/04/21, Certification date to: 06/04/21    Referring Physician: Jamshid Weber PA            Initial Assessment        See Occupational Therapy evaluation dated 06/04/21 in Epic electronic health record.

## 2021-06-05 VITALS
HEART RATE: 68 BPM | DIASTOLIC BLOOD PRESSURE: 76 MMHG | RESPIRATION RATE: 16 BRPM | BODY MASS INDEX: 41.3 KG/M2 | OXYGEN SATURATION: 95 % | TEMPERATURE: 97.8 F | SYSTOLIC BLOOD PRESSURE: 130 MMHG | WEIGHT: 313 LBS

## 2021-06-05 VITALS
DIASTOLIC BLOOD PRESSURE: 98 MMHG | SYSTOLIC BLOOD PRESSURE: 138 MMHG | WEIGHT: 313 LBS | HEART RATE: 68 BPM | TEMPERATURE: 97.6 F | BODY MASS INDEX: 43.65 KG/M2 | OXYGEN SATURATION: 97 % | RESPIRATION RATE: 18 BRPM

## 2021-06-05 VITALS
TEMPERATURE: 97 F | DIASTOLIC BLOOD PRESSURE: 77 MMHG | SYSTOLIC BLOOD PRESSURE: 135 MMHG | OXYGEN SATURATION: 96 % | HEIGHT: 71 IN | HEART RATE: 70 BPM | BODY MASS INDEX: 43.82 KG/M2 | WEIGHT: 313 LBS | RESPIRATION RATE: 18 BRPM

## 2021-06-05 VITALS
DIASTOLIC BLOOD PRESSURE: 83 MMHG | BODY MASS INDEX: 41.7 KG/M2 | HEART RATE: 67 BPM | OXYGEN SATURATION: 94 % | RESPIRATION RATE: 18 BRPM | TEMPERATURE: 97 F | SYSTOLIC BLOOD PRESSURE: 138 MMHG | WEIGHT: 299 LBS

## 2021-06-07 ENCOUNTER — TELEPHONE (OUTPATIENT)
Dept: INTERNAL MEDICINE | Facility: CLINIC | Age: 66
End: 2021-06-07
Payer: MEDICARE

## 2021-06-07 ENCOUNTER — TELEPHONE (OUTPATIENT)
Dept: INTERNAL MEDICINE | Facility: CLINIC | Age: 66
End: 2021-06-07

## 2021-06-07 ENCOUNTER — PATIENT OUTREACH (OUTPATIENT)
Dept: CARE COORDINATION | Facility: CLINIC | Age: 66
End: 2021-06-07

## 2021-06-07 ENCOUNTER — ANTICOAGULATION THERAPY VISIT (OUTPATIENT)
Dept: INTERNAL MEDICINE | Facility: CLINIC | Age: 66
End: 2021-06-07

## 2021-06-07 ENCOUNTER — TELEPHONE (OUTPATIENT)
Dept: NEUROLOGY | Facility: CLINIC | Age: 66
End: 2021-06-07

## 2021-06-07 DIAGNOSIS — Z79.01 LONG TERM CURRENT USE OF ANTICOAGULANT THERAPY: ICD-10-CM

## 2021-06-07 DIAGNOSIS — G89.29 CHRONIC BILATERAL LOW BACK PAIN WITHOUT SCIATICA: ICD-10-CM

## 2021-06-07 DIAGNOSIS — I48.91 ATRIAL FIBRILLATION (H): ICD-10-CM

## 2021-06-07 DIAGNOSIS — Z95.2 AORTIC VALVE PROSTHESIS PRESENT: ICD-10-CM

## 2021-06-07 DIAGNOSIS — M54.50 CHRONIC BILATERAL LOW BACK PAIN WITHOUT SCIATICA: ICD-10-CM

## 2021-06-07 DIAGNOSIS — I48.20 CHRONIC ATRIAL FIBRILLATION (H): ICD-10-CM

## 2021-06-07 LAB — INR PPP: 3.1 (ref 0.9–1.1)

## 2021-06-07 NOTE — PROGRESS NOTES
ANTICOAGULATION MANAGEMENT     Patient Name:  Todd S Aschoff  Date:  6/7/2021    ASSESSMENT /SUBJECTIVE:    Today's INR result of 3.1 is therapeutic. Goal INR of 2.5-3.5      Warfarin dose taken: Warfarin taken as instructed    Diet: No new diet changes affecting INR    Medication changes/ interactions: No new medications/supplements affecting INR    Previous INR: Supratherapeutic     S/S of bleeding or thromboembolism: No    New injury or illness: Pt was recently hospitalized for weakness, discharged on 06/04. No new CVAs were found. Pt denies weakness today and is feeling back to normal.     Upcoming surgery, procedure or cardioversion: No    Additional findings: None      PLAN:    Warfarin Dosing Instructions: Continue your current warfarin dose 5mg every Sun, Wed, Fri; 7.5mg all other days    Instructed patient to follow up no later than: 06/11 Home care to re-check    Education provided: Target INR goal and significance of current INR result    Telephone call with home care nurse Stephani whom verbalizes understanding and agrees to plan    Instructed to call the Anticoagulation Clinic for any changes, questions or concerns. (#[phone])        Oscar Briscoe RN      OBJECTIVE:  Recent labs: (last 7 days)     06/03/21  1026 06/04/21  1107 06/07/21   INR 3.82* 3.72* 3.1*   [id number] 17*  --   --          No question data found.  Anticoagulation Summary  As of 6/7/2021    INR goal:  2.5-3.5   TTR:  40.4 % (10.1 mo)   INR used for dosing:  3.1 (6/7/2021)   Warfarin maintenance plan:  5 mg (5 mg x 1) every Sun, Wed, Fri; 7.5 mg (5 mg x 1.5) all other days   Full warfarin instructions:  5 mg every Sun, Wed, Fri; 7.5 mg all other days   Weekly warfarin total:  45 mg   No change documented:  Oscar Briscoe RN   Plan last modified:  Linda Tang RN (5/10/2021)   Next INR check:  6/11/2021   Priority:  Maintenance   Target end date:  Indefinite    Indications    Aortic valve prosthesis present [Z95.2]  Atrial  fibrillation (H) [I48.91]  Long term current use of anticoagulant therapy [Z79.01]  Chronic atrial fibrillation (H) [I48.20]             Anticoagulation Episode Summary     INR check location:      Preferred lab:  EXTERNAL LAB    Send INR reminders to:  AIDA MCWILLIAMS    Comments:  5mg tabs / CALENDAR / needs bridging. may leave DVM or speak with Paul per signed consent // Home care      Anticoagulation Care Providers     Provider Role Specialty Phone number    Obdulio Ingram MD Referring Internal Medicine [phone]

## 2021-06-07 NOTE — TELEPHONE ENCOUNTER
Stephani from [facility] ([phone]) calls to advise patient is taking MYRBETRIQ which conflicts with propafenone.

## 2021-06-07 NOTE — PROGRESS NOTES
Clinic Care Coordination Contact  [facility]/Voicemail       Clinical Data: Care Coordinator Outreach  Outreach attempted x 1.  Left message with spouse Lissy.  Plan: Care Coordinator will try to reach patient again in 1-2 business days.    Chart Review: Referral from Care Transition: Home care discharge  Additional pertinent details: [facility]  [facility], [facility], and [facility]    Phone: [phone]

## 2021-06-08 ENCOUNTER — PATIENT OUTREACH (OUTPATIENT)
Dept: CARE COORDINATION | Facility: CLINIC | Age: 66
End: 2021-06-08

## 2021-06-08 NOTE — PROGRESS NOTES
Clinic Care Coordination Contact  [facility]/Voicemail       Clinical Data: Care Coordinator Outreach  Outreach attempted x 2.  Left message on patient's voicemail with call back information and requested return call.    Plan: Care Coordinator will send care coordination introduction letter with care coordinator contact information and explanation of care coordination services via [employer]. Care Coordinator will do no further outreaches at this time.    BIRD Ford  Clinic Care Coordination  [facility] : [place], [place], and [place]  Phone: [phone]

## 2021-06-09 ENCOUNTER — TELEPHONE (OUTPATIENT)
Dept: INTERNAL MEDICINE | Facility: CLINIC | Age: 66
End: 2021-06-09

## 2021-06-09 RX ORDER — ACETAMINOPHEN 650 MG/1
TABLET, FILM COATED, EXTENDED RELEASE ORAL
Qty: 180 TABLET | Refills: 0 | OUTPATIENT
Start: 2021-06-09

## 2021-06-09 NOTE — TELEPHONE ENCOUNTER
2nd attempt to schedule-  Post hospitalization- 6-8 weeks- schedule end of July 1st part of August.

## 2021-06-11 ENCOUNTER — ANTICOAGULATION THERAPY VISIT (OUTPATIENT)
Dept: INTERNAL MEDICINE | Facility: CLINIC | Age: 66
End: 2021-06-11

## 2021-06-11 DIAGNOSIS — I48.20 CHRONIC ATRIAL FIBRILLATION (H): ICD-10-CM

## 2021-06-11 DIAGNOSIS — Z95.2 AORTIC VALVE PROSTHESIS PRESENT: ICD-10-CM

## 2021-06-11 DIAGNOSIS — Z79.01 LONG TERM CURRENT USE OF ANTICOAGULANT THERAPY: ICD-10-CM

## 2021-06-11 DIAGNOSIS — I48.91 ATRIAL FIBRILLATION (H): ICD-10-CM

## 2021-06-11 LAB — INR PPP: 3.5 (ref 0.9–1.1)

## 2021-06-11 NOTE — PROGRESS NOTES
New INR referral needed.    [facility] HC nurse called and left a VM reporting INR of 3.5    Please return call.   Thanks.  Lucy Jeff RN on 6/11/2021 at 9:42 AM

## 2021-06-11 NOTE — TELEPHONE ENCOUNTER
3rd attempt to schedule following CVA- Patient ideally should be seen by the end of July- 1st of August.

## 2021-06-11 NOTE — PROGRESS NOTES
Missed return calls from SHYANN Dejesus. Left another message to call back.    Jessi Linder RN on 6/11/2021 at 12:46 PM

## 2021-06-11 NOTE — PROGRESS NOTES
ANTICOAGULATION MANAGEMENT     Patient Name:  Todd S Aschoff  Date:  6/11/2021    ASSESSMENT /SUBJECTIVE:    Today's INR result of 3.5 is therapeutic. Goal INR of 2.5-3.5      Warfarin dose taken: Warfarin taken as instructed    Diet: No new diet changes affecting INR    Medication changes/ interactions: No new medications/supplements affecting INR    Previous INR: Therapeutic     S/S of bleeding or thromboembolism: No    New injury or illness: No    Upcoming surgery, procedure or cardioversion: No    Additional findings: None      PLAN:    Warfarin Dosing Instructions: Continue your current warfarin dose 5 mg Sun/W/F and 7.5 mg ROW    Instructed patient to follow up no later than: 1 week  Orders given to  Homecare nurse/facility to recheck    Education provided: None required    Telephone call with home care nurse Anjelica whom verbalizes understanding and agrees to plan    Instructed to call the Anticoagulation Clinic for any changes, questions or concerns. (#[phone])        Jessi Linder RN      OBJECTIVE:  Recent labs: (last 7 days)     06/07/21 06/11/21   INR 3.1* 3.5*         No question data found.  Anticoagulation Summary  As of 6/11/2021    INR goal:  2.5-3.5   TTR:  40.4 % (10.1 mo)   INR used for dosing:  3.5 (6/11/2021)   Warfarin maintenance plan:  5 mg (5 mg x 1) every Sun, Wed, Fri; 7.5 mg (5 mg x 1.5) all other days   Full warfarin instructions:  5 mg every Sun, Wed, Fri; 7.5 mg all other days   Weekly warfarin total:  45 mg   No change documented:  Jessi Linder RN   Plan last modified:  Linda Tang RN (5/10/2021)   Next INR check:  6/18/2021   Priority:  Maintenance   Target end date:  Indefinite    Indications    Aortic valve prosthesis present [Z95.2]  Atrial fibrillation (H) [I48.91]  Long term current use of anticoagulant therapy [Z79.01]  Chronic atrial fibrillation (H) [I48.20]             Anticoagulation Episode Summary     INR check location:      Preferred lab:  EXTERNAL LAB     Send INR reminders to:  AIDA MCWILLIAMS    Comments:  5mg tabs / CALENDAR / needs bridging. may leave [facility] or speak with Paul per signed consent // Home care      Anticoagulation Care Providers     Provider Role Specialty Phone number    Obdulio Ingram MD Referring Internal Medicine [phone]

## 2021-06-15 ENCOUNTER — TELEPHONE (OUTPATIENT)
Dept: INTERNAL MEDICINE | Facility: CLINIC | Age: 66
End: 2021-06-15

## 2021-06-15 NOTE — TELEPHONE ENCOUNTER
[facility] Nurse Triage Anticoagulation Note      Provider: MARLEY Charles  Facility: [facility]    Facility Type: TCU    Caller: Jon  Call Back Number:  [phone]    Reason for call: INR    Today s INR: 1.71  Previous INR: 2/8 1.58(5mg).  Home dose:  7.5mg Sun and Thurs and 5mg all other days.      Diagnosis/Goal: Mechanical Valve  Heparin/Lovenox: Yes; Lovenox 135mg Q 12 hours  Currently on ABX: No  Other interacting Medications: None  Missed or refused doses: No    Verbal Order/Direction given by Provider: Warfarin 7.5mg on Sun and Thurs and 5mg all other days.  Continue Lovenox 135mg Q 12 hours.  Next INR 2/12/21.      Provider giving order: MARLEY Charles    Verbal order given to: Sekou Guerrero RN

## 2021-06-15 NOTE — PROGRESS NOTES
[facility]      Facility:    [facility] [place] [[id number]]  Code Status: FULL CODE      Chief Complaint/Reason for Visit:   Chief Complaint   Patient presents with     Discharge Summary       HPI:   Nicko is a 65 y.o. male who is a transfer from [facility] with an admission on 1/22/21 and discharge on 2/7/21. He has a PMH of A-fib on coumadin with MAV, hypothyroidism, depression, dementia, who was hospitalized 1/9-1/13/21 there for left leg wound with drainage requiring I&D (initially from a fall) per hematoma formation and then was discharged home. He was then re-admitted with generalized weakness, confusion and hypotension. He was also found to have an acute CVA w/o local deficits and had a seizure prior to admission (apparently right arm shaking with unresponsiveness). Per CT imaging found to have right occipital infarct and MRI showed an acute infarct in the right frontal vertex and multiple chronic infarcts in the right occipital lobe and bilateral cerebellar hemispheres. Further, MRA showed possible diffuse stenosis of the left PCA. TTE unchanged.  Neuro consulted, started on lamotrigine and will f/u with neuro outpatient. Per chart review during prior hospitalization initially found to have infected hematoma, underwent I&D on 1/10, wound grew E. Coli, given rocephin and discharged on cefuroxime per ID. He then had a dehiscence of wound with necrosis, underwent surgery with plastics on 1/26 and had 1.4L blood removal with VAC placement with changes on M/W/Fr. He then had skin graft placement on 2/3/21 with ongoing VAC usage. Of note he was supra therapeutic, required vit K prior to surgery as well. He also developed CHARISSA with peak Cr of 1.66, which normalized. He was then discharged to the TCU for rehab.    He has concluded his TCU stay and will be discharged to home with services on 2/20/21.    Past Medical History:  Past Medical History:    Diagnosis Date     Advanced directives, counseling/discussion 1/6/2012    Discussed Advance Directive planning with patient; information given to patient to review.     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 Provider to review     Leg wound, left 1/9/2021     Long term current use of anticoagulant therapy 8/7/2002    LW Modifier:  Coumadin, since mechanical aortic valve LW Onset:  1992 ; Anticoagulant Rx Long Term Problem list name updated by automated process. Provider to review     Lumbar radiculopathy 4/3/2013     Lumbar surgical wound fluid collection 5/23/2013     Memory difficulties 1/16/2015     Morbid obesity (H) 4/20/2010    LW Modifier:  BMI 41.3 (Apr 2010) ; Obesity Morbid     OAB (overactive bladder) 5/11/2015     Persons encountering health services in other specified circumstances 4/3/2012    Formatting of this note might be different from the original. EMERGENCY CARE PLAN Presenting Problem Signs and Symptoms Treatment Plan   Questions or conerns during clinic hours    I will call the clinic directly    Questions or conerns outside clinic hours    I will call the 24 hour nurse line at [phone]   Patient needs to schedule an appointment    I will call the 24 hour scheduling team at      Polypharmacy 1/22/2021     Radiculitis 4/20/2010    LW Modifier:  Right foot, s/p R AMANDA LW Onset:  2002 ; Neuralgia     Recurrent major depressive episodes, in full remission (H) 10/30/2012     Renal insufficiency 1/22/2021     Rotator cuff tear 1/26/2015    Rotator cuff tear, right     S/P lumbar fusion 4/5/2018     Spinal stenosis of lumbar region 4/5/2018     Suicide attempt by multiple drug overdose, initial encounter (H) 11/27/2020     Tourette syndrome 10/30/2012           Surgical History:  Past Surgical History:   Procedure Laterality Date     ([place]) [place] NEE SCOPE MED W LAT MENISCECT WWO DEBRIBE/SHAVE ANY CO       (IA) [place] TOTAL HIP ARTHROPLASTY       ELBOW SURGERY       [place] UNLISTED NECK/THORAX       [place] UNLISTED ORBIT       [place] UNLISTED SPINE         Family History:   Family History   Problem Relation Age of Onset     Diabetes Mother      Diabetes Father        Social History:    Social History     Socioeconomic History     Marital status:      Spouse name: Not on file     Number of children: Not on file     Years of education: Not on file     Highest education level: Not on file   Occupational History     Not on file   Social Needs     Financial resource strain: Not on file     Food insecurity     Worry: Not on file     Inability: Not on file     Transportation needs     Medical: Not on file     Non-medical: Not on file   Tobacco Use     Smoking status: Never Smoker     Smokeless tobacco: Never Used   Substance and Sexual Activity     Alcohol use: Not on file     Drug use: Not on file     Sexual activity: Not on file   Lifestyle     Physical activity     Days per week: Not on file     Minutes per session: Not on file     Stress: Not on file   Relationships     Social connections     Talks on phone: Not on file     Gets together: Not on file     Attends [employer] service: Not on file     Active member of club or organization: Not on file     Attends meetings of clubs or organizations: Not on file      Relationship status: Not on file     Intimate partner violence     Fear of current or ex partner: Not on file     Emotionally abused: Not on file     Physically abused: Not on file     Forced sexual activity: Not on file   Other Topics Concern     Not on file   Social History Narrative     Not on file          Review of Systems   Constitutional: Positive for activity change. Negative for appetite change, chills, diaphoresis, fatigue and fever.   HENT: Negative for congestion and hearing loss.    Eyes: Negative.    Respiratory: Negative for shortness of breath.    Cardiovascular: Negative.    Gastrointestinal: Negative for abdominal distention, abdominal pain, blood in stool, constipation, diarrhea and nausea.   Endocrine: Negative.    Genitourinary: Negative.    Musculoskeletal:        L LE has periodic pain, elevated   Skin: Positive for wound.        L LE with VAC   Allergic/Immunologic: Negative.    Neurological: Negative.    Psychiatric/Behavioral: Negative for agitation, confusion and sleep disturbance. The patient is not hyperactive.        Vitals:    02/18/21 0737   BP: 138/83   Pulse: 67   Resp: 18   Temp: 97  F (36.1  C)   SpO2: 94%   Weight: (!) 299 lb (135.6 kg)       Physical Exam  Constitutional:       Appearance: He is obese.   HENT:      Head: Normocephalic.      Right Ear: External ear normal.      Left Ear: External ear normal.      Nose: No congestion.      Mouth/Throat:      Pharynx: No oropharyngeal exudate.   Eyes:      General:         Right eye: No discharge.         Left eye: No discharge.   Neck:      Musculoskeletal: Normal range of motion.   Cardiovascular:      Rate and Rhythm: Rhythm irregular.      Comments: A-fib  Pulmonary:      Effort: Pulmonary effort is normal.      Comments: CTA, RA  Abdominal:      General: There is no distension.      Palpations: Abdomen is soft.      Comments: Denies constipation or diarrhea   Genitourinary:     Comments: No issues reported  Musculoskeletal:       Right lower leg: No edema.      Left lower leg: Edema present.      Comments: LLE pain, elevated, WBAT in boot   Skin:     Comments: L LE   Neurological:      Mental Status: He is oriented to person, place, and time.   Psychiatric:         Mood and Affect: Mood normal.         Medication List:  Current Outpatient Medications   Medication Sig     acetaminophen (TYLENOL) 500 MG tablet Take 1,000 mg by mouth 3 (three) times a day. And 1000mg daily PRN, max 4gm/day     azelastine (ASTELIN) 137 mcg (0.1 %) nasal spray 2 sprays into each nostril 2 (two) times a day as needed.     cetirizine (ZYRTEC) 10 MG tablet Take 10 mg by mouth daily as needed.     donepeziL (ARICEPT) 10 MG tablet Take 20 mg by mouth at bedtime.     folic acid (FOLVITE) 1 MG tablet Take 5 mg by mouth daily.     guanFACINE (TENEX) 1 MG tablet Take 1 mg by mouth at bedtime.     lamoTRIgine (LAMICTAL) 25 MG tablet Take 25 mg by mouth daily.     levothyroxine (SYNTHROID, LEVOTHROID) 150 MCG tablet Take 150 mcg by mouth daily.     liothyronine (CYTOMEL) 25 MCG tablet Take 25 mcg by mouth daily.     melatonin 3 mg Tab tablet Take 3 mg by mouth at bedtime as needed.     memantine (NAMENDA) 10 MG tablet Take 10 mg by mouth 2 (two) times a day.     mirabegron (MYRBETRIQ) 50 mg Tb24 ER tablet Take 50 mg by mouth daily.     pregabalin (LYRICA) 100 MG capsule Take 1 capsule (100 mg total) by mouth 3 (three) times a day.     propafenone (RYTHMOL) 150 MG tablet Take 150 mg by mouth every 8 (eight) hours as needed.     senna-docusate (SENNOSIDES-DOCUSATE SODIUM) 8.6-50 mg tablet Take 1 tablet by mouth 2 (two) times a day as needed for constipation.     silver sulfADIAZINE (SILVADENE, SSD) 1 % cream Apply 1 application topically 2 (two) times a day.     simvastatin (ZOCOR) 40 MG tablet Take 40 mg by mouth daily.     venlafaxine (EFFEXOR-XR) 150 MG 24 hr capsule Take 300 mg by mouth daily.     warfarin sodium (WARFARIN ORAL) Take by mouth. 2/19/21 INR 2.81  Cont  7.5mg Sun and Thurs and 5mg AOD.   Next INR 2/24/21 with home care RN.    2/15/21 INR 2.65  Take 7.5mg Sun and Thurs and 5mg AOD.  Next INR 2/19/21.    2/12/21 INR 1.97 Give 6mg tonight & tomorrow, 7.5mg on Sun. Continue Lovenox. Next INR 2/15.  2/9/21 INR 1.71  Warfarin 7.5mg Sun and Thurs and 5mg AOD.  Cont Lovenox 135mg Q 12 hours.  Next INR 2/12/21.       Labs:  Results for orders placed or performed in visit on 02/16/21   Basic Metabolic Panel   Result Value Ref Range    Sodium 140 136 - 145 mmol/L    Potassium 4.1 3.5 - 5.0 mmol/L    Chloride 106 98 - 107 mmol/L    CO2 29 22 - 31 mmol/L    Anion Gap, Calculation 5 5 - 18 mmol/L    Glucose 95 70 - 125 mg/dL    Calcium 9.0 8.5 - 10.5 mg/dL    BUN 19 8 - 22 mg/dL    Creatinine 0.84 0.70 - 1.30 mg/dL    GFR MDRD Af Amer >60 >60 mL/min/1.73m2    GFR MDRD Non Af Amer >60 >60 mL/min/1.73m2         Assessment/Plan:    Left lower extremity s/p hematoma evacuation and I&D per non healing cellulitis s/p VAC and skin graft placement on 2/3/21: continue VAC, drsg changes as ordered, f/u with Dr Plascencia as ordered    Suspected seizure: prior to hospitalization, had R arm shaking and unresponsiveness, neuro started lamotrigine 25mg daily, will f/u and then increase to 50mg daily and have EEG    Acute right frontal infarct, silent, no neurological deficits: continue statin, anticoagulation, f/u with neurology in 8 wks    Pain control: increase tylenol to 1000mg three times a day and daily PRN and lyrica 100mg three times a day. Discontinued oxycodone    A-fib with MAV: continue coumadin 7.5mg Th/Sat and 5mg AOD, recheck INR on 2/24    Dementia: continue donepezil 20mg at HS and namenda 10mg two times a day    CLIF: last Cr 1.11 with GFR >60 on 2/1/21    Normocytic anemia: last Hgb 12.6 on 2/6/21    Hypothyroidism: continue synthroid 150mcg daily and cytomel  25mg daily, last TSH 2.17    OAB: continue mirabegron 50mg daily    Constipation: continue senna S PRN    Depression:  continue Effexor 300mg daily    Insomnia: continue melatonin 3mg at HS PRN, does not use    Disposition: lives with wife. SLUMS 22/30    MEDICAL EQUIPMENT NEEDS:  na    DISCHARGE PLAN/FACE TO FACE:  I certify that services are/were furnished while this patient was under the care of a physician and that a physician or an allowed non-physician practitioner (NPP), had a face-to-face encounter that meets the physician face-to-face encounter requirements. The encounter was in whole, or in part, related to the primary reason for home health. The patient is confined to his/her home and needs intermittent skilled nursing, physical therapy, speech-language pathology, or the continued need for occupational therapy. A plan of care has been established by a physician and is periodically reviewed by a physician.  Date of Face-to-Face Encounter: 2/18/21    I certify that, based on my findings, the following services are medically necessary home health services: C HHA/RN and PT/OT to evaluate and treat    My clinical findings support the need for the above skilled services because: patient will be discharging to [facility]/memory care.      Patient to re-establish plan of care with their PCP within 7 days after leaving TCU.       The care plan has been reviewed and all orders signed. Changes to care plan, if any, as noted. Otherwise, continue care plan of care.  The total time spent with this patient was 38 minutes, with greater than 50% spent in counseling and coordination of care that included multiple issues per extensive surgical hx, f/u with neuro/ortho, pain control and therapy, which lasted 20 minutes.      Electronically signed by: Kem Matta NP

## 2021-06-15 NOTE — TELEPHONE ENCOUNTER
[facility] Nurse Triage Anticoagulation Note      Provider: MARLEY Enriquez  Facility: [facility]    Facility Type: TCU    Caller: Julien  Call Back Number:  [phone]    Reason for call: INR    Today s INR: 1.97  Previous INR: 2/9/21 INR 1.71 7.5mg Sun & Th, 5mg AOD.    Diagnosis/Goal: Mechanical Valve  Heparin/Lovenox: Yes   Currently on ABX: No  Other interacting Medications: None  Missed or refused doses: No    Verbal Order/Direction given by Provider: Continue Lovenox, Give Coumadin 6mg Fri Sat, 7.5mg Sun. Check INR 2/15.    Provider giving order: MARLEY Enriquez    Verbal order given to: Julien Segura RN

## 2021-06-15 NOTE — PROGRESS NOTES
[facility]      Facility:    [facility] [[id number]]  Code Status: UNKNOWN      Chief Complaint/Reason for Visit:  Chief Complaint   Patient presents with     H & P     Recent hospitalization for CVA, atrial fibrillation, mechanical aortic valve, chronic pain, dementia, depression.  Left leg wound with drainage and nonhealing, acute toxic metabolic encephalopathy that did resolve, acute kidney injury that did improve, left shoulder pain, hypotension, chronic anticoagulation, acute anemia.       HPI:   Nicko is a 65 y.o. male who is recently admitted to the hospital previously on 192 113 for left leg wound with drainage.  He was found to have infected hematoma requiring I&D on 110 and then ultimately was discharged home on antibiotics.  He was then readmitted on on 1/22/2021 for generalized weakness.  And did have a CVA.  His his his deficits did resolve and okay also likely seizure.  He underwent surgery with plastics on 126 with removal of 1400 mL of the hematoma with no active bleeding.  He is on Coumadin at this time and he did get placed on a wound VAC.  Wound is improving and he will be undergoing Monday Wednesday and Friday wound VAC changes.    For CVA did have a right frontal infarct suspect this is a silent infarct disease no neurological deficits at this time.  There was seizure-like activity and he had right arm shaking with unresponsiveness.  Lamotrigine was started by neurology and seizure precautions were followed.  He will follow up outpatient with neurology.    He does have his hematoma status post I&D and does a wound VAC.  He did grow E. coli Enterococcus bacillus felt that E. coli was the pathogen and was IV ceftriaxone at the time of discharge Zaroxolyn made for an additional 7 days.  Is acute toxic metabolic encephalopathy did resolve at this time he did have acute kidney injury in the hospital repeat creatinine 1.66.  He was quite hypotensive at  this time and was recommend no NSAIDs and periodic basic metabolic profile be done tomorrow.  He does have chronic pain at this time and also his hypotension did resolve.  Patient was treated properly and transferred here to the TCU at [facility] in stable condition.    Patient does not have a wound VAC on at this time as mentioned in the notes however he does have a wound graft on there.  There is bleeding underneath his graft site on his upper thigh.  We are going to investigate this when the nurse gets ready and she can do a dressing change all at once.  He otherwise has no new concerns.    Past Medical History:  Past Medical History:   Diagnosis Date     Advanced directives, counseling/discussion 1/6/2012    Discussed Advance Directive planning with patient; information given to patient to review.     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 Provider to review     Leg wound, left 1/9/2021     Long term current use of anticoagulant therapy 8/7/2002    LW Modifier:  Coumadin, since mechanical aortic valve LW Onset:  1992 ; Anticoagulant Rx Long Term Problem list name updated by automated process. Provider to review     Lumbar radiculopathy 4/3/2013     Lumbar surgical wound fluid collection  5/23/2013     Memory difficulties 1/16/2015     Morbid obesity (H) 4/20/2010    LW Modifier:  BMI 41.3 (Apr 2010) ; Obesity Morbid     OAB (overactive bladder) 5/11/2015     Persons encountering health services in other specified circumstances 4/3/2012    Formatting of this note might be different from the original. EMERGENCY CARE PLAN Presenting Problem Signs and Symptoms Treatment Plan   Questions or conerns during clinic hours    I will call the clinic directly    Questions or conerns outside clinic hours    I will call the 24 hour nurse line at [phone]   Patient needs to schedule an appointment    I will call the 24 hour scheduling team at     Polypharmacy 1/22/2021     Radiculitis 4/20/2010    LW Modifier:  Right foot, s/p R AMANDA LW Onset:  2002 ; Neuralgia     Recurrent major depressive episodes, in full remission (H) 10/30/2012     Renal insufficiency 1/22/2021     Rotator cuff tear 1/26/2015    Rotator cuff tear, right     S/P lumbar fusion 4/5/2018     Spinal stenosis of lumbar region 4/5/2018     Suicide attempt by multiple drug overdose, initial encounter (H) 11/27/2020     Tourette syndrome 10/30/2012           Surgical History:  Past Surgical History:   Procedure Laterality Date     (IA) LA NEE SCOPE MED W LAT MENISCECT WWO DEBRIBE/SHAVE ANY CO       (IA) LA TOTAL HIP ARTHROPLASTY       ELBOW SURGERY       LA UNLISTED NECK/THORAX       LA UNLISTED ORBIT       LA UNLISTED SPINE         Family History:   Family History   Problem Relation Age of Onset     Diabetes Mother      Diabetes Father        Social History:    Social History     Socioeconomic History     Marital status:      Spouse name: None     Number of children: None     Years of education: None     Highest education level: None   Occupational History     None   Social Needs     Financial resource strain: None     Food insecurity     Worry: None     Inability: None     Transportation needs     Medical: None     Non-medical: None    Tobacco Use     Smoking status: Never Smoker     Smokeless tobacco: Never Used   Substance and Sexual Activity     Alcohol use: None     Drug use: None     Sexual activity: None   Lifestyle     Physical activity     Days per week: None     Minutes per session: None     Stress: None   Relationships     Social connections     Talks on phone: None     Gets together: None     Attends Congregational service: None     Active member of club or organization: None     Attends meetings of clubs or organizations: None     Relationship status: None     Intimate partner violence     Fear of current or ex partner: None     Emotionally abused: None     Physically abused: None     Forced sexual activity: None   Other Topics Concern     None   Social History Narrative     None          Review of Systems   Constitutional:        Patient denies any pain fevers chills nausea vomit diarrhea change in vision hearing taste or smell weakness one-sided of the chest pain shortness of breath.  Denies any current shortness stool polyphagia polydipsia polyuria depression or anxiety and the main review of systems is negative.       Vitals:    02/15/21 0954   BP: 143/80   Pulse: 77   Resp: 16   Temp: 97  F (36.1  C)   SpO2: 96%       Physical Exam  Constitutional:       General: He is not in acute distress.     Appearance: He is not toxic-appearing.   HENT:      Head: Normocephalic and atraumatic.      Nose: Nose normal.      Mouth/Throat:      Mouth: Mucous membranes are moist.   Cardiovascular:      Comments: Patient's heart sounds are irregularly irregular with mechanical click.  Pulmonary:      Effort: Pulmonary effort is normal. No respiratory distress.      Breath sounds: Normal breath sounds. No wheezing.   Abdominal:      General: Bowel sounds are normal. There is distension.      Tenderness: There is no abdominal tenderness.   Musculoskeletal:      Comments: The site of the donor graft on the upper thigh does have blood underneath the  dressing at this time.  We will investigate further.  We will investigate the rest of the wound when the nurses ready for wound rounds today.   Skin:     General: Skin is warm and dry.   Neurological:      Mental Status: He is alert. Mental status is at baseline.   Psychiatric:         Mood and Affect: Mood normal.         Behavior: Behavior normal.         Medication List:  Current Outpatient Medications   Medication Sig     acetaminophen (TYLENOL) 500 MG tablet Take 1,000 mg by mouth 3 (three) times a day. And 1000mg daily PRN, max 4gm/day     azelastine (ASTELIN) 137 mcg (0.1 %) nasal spray 2 sprays into each nostril 2 (two) times a day as needed.     cetirizine (ZYRTEC) 10 MG tablet Take 10 mg by mouth daily as needed.     donepeziL (ARICEPT) 10 MG tablet Take 20 mg by mouth at bedtime.     enoxaparin ANTICOAGULANT (LOVENOX) 150 mg/mL injection Inject 135 mg under the skin every 12 (twelve) hours.     folic acid (FOLVITE) 1 MG tablet Take 5 mg by mouth daily.     guanFACINE (TENEX) 1 MG tablet Take 1 mg by mouth at bedtime.     lamoTRIgine (LAMICTAL) 25 MG tablet Take 25 mg by mouth daily.     levothyroxine (SYNTHROID, LEVOTHROID) 150 MCG tablet Take 150 mcg by mouth daily.     liothyronine (CYTOMEL) 25 MCG tablet Take 25 mcg by mouth daily.     melatonin 3 mg Tab tablet Take 3 mg by mouth at bedtime as needed.     memantine (NAMENDA) 10 MG tablet Take 10 mg by mouth 2 (two) times a day.     mirabegron (MYRBETRIQ) 50 mg Tb24 ER tablet Take 50 mg by mouth daily.     oxyCODONE (ROXICODONE) 5 MG immediate release tablet Take 5 mg by mouth every 4 (four) hours as needed.     pregabalin (LYRICA) 100 MG capsule Take 100 mg by mouth 3 (three) times a day.     propafenone (RYTHMOL) 150 MG tablet Take 150 mg by mouth every 8 (eight) hours as needed.     senna-docusate (SENNOSIDES-DOCUSATE SODIUM) 8.6-50 mg tablet Take 1 tablet by mouth 2 (two) times a day as needed for constipation.     silver sulfADIAZINE (SILVADENE,  SSD) 1 % cream Apply 1 application topically 2 (two) times a day.     simvastatin (ZOCOR) 40 MG tablet Take 40 mg by mouth daily.     venlafaxine (EFFEXOR-XR) 150 MG 24 hr capsule Take 300 mg by mouth daily.     warfarin sodium (WARFARIN ORAL) Take by mouth. 2/12/21 INR 1.97 Give 6mg tonight & tomorrow, 7.5mg on Sun. Continue Lovenox. Next INR 2/15.  2/9/21 INR 1.71  Warfarin 7.5mg Sun and Thurs and 5mg AOD.  Cont Lovenox 135mg Q 12 hours.  Next INR 2/12/21.       Labs:      Assessment:    ICD-10-CM    1. Cerebrovascular accident (CVA), unspecified mechanism (H)  I63.9    2. Aortic valve prosthesis present  Z95.2    3. Chronic atrial fibrillation (H)  I48.20    4. Long term current use of anticoagulant therapy  Z79.01    5. Depressive disorder  F32.9    6. Anemia, unspecified type  D64.9    7. Physical deconditioning  R53.81    8. Wound of left lower extremity, subsequent encounter  S81.384V        Plan: Plan at this time we will look at his wounds today when the nurses ready.  He will stand his long-term anticoagulant we will address his INR today as it is due today to come in.  There is no signs of any new CVA at this time and his mechanical valve sounds intact.  His atrial fibrillation has good rate control so no new changes because of the labs in the hospital I will get a CBC tomorrow and a basic metabolic profile tomorrow and we will investigate the wounds today on wound rounds when the nurses have finished their med Pass this morning.  I will continue to monitor above medical problems and no other changes to care plan at this time.        Electronically signed by: Felipe Mancuso DO

## 2021-06-15 NOTE — TELEPHONE ENCOUNTER
[facility] Nurse Triage Anticoagulation Note      Provider: MARLEY Charles  Facility: [facility]    Facility Type: TCU    Caller: Josee  Call Back Number:  [phone]    Reason for call: INR    Today s INR: 2.65  Previous INR: 2/12 1.97(6mg Fri and Sat and 7.5mg Sun), 2/9 1.71(7.5mg Sun and Thurs and 5mg AOD---home dose).      Diagnosis/Goal: Mechanical Valve  Heparin/Lovenox: Yes; Lovenox 135mg Q 12 hours  Currently on ABX: No  Other interacting Medications: None  Missed or refused doses: No    Verbal Order/Direction given by Provider: Discontinue Lovenox.  Take Warfarin 7.5mg Sun and Thurs and 5mg all other days.  Next INR 2/19/21.      Provider giving order: MARLEY Charles    Verbal order given to: Josee/Sekou Guerrero RN

## 2021-06-15 NOTE — PROGRESS NOTES
[facility]    [id number]   Code Status: FULL CODE      CHIEF COMPLAINT/REASON FOR VISIT:  Chief Complaint   Patient presents with     Review Of Multiple Medical Conditions     review VS, labs, F/U left leg wound, pain management . encounter for anticoagulation        HISTORY:      HPI: Nicko is a 65 y.o. male undergoing physical and occupational therapy at [facility].  He is with past medical history of aortic dissection status post mechanical aortic valve plus graft 1992, atrial fibrillation currently on Coumadin, chronic pain, dementia and depression who was hospitalized 192 113 for left leg wound with drainage initially caused by a fall approximately 1 month prior.  His wound was found to be infected with a hematoma requiring an I&D on 1/10/2021.  He was then discharged home on oral antibiotics.  He was readmitted on 1/22/2021 with generalized weakness confusion and hypotension.  Found to have an acute CVA but no longer has any focal deficits.  Per the records it appears she was also likely to have a seizure prior to the admission.  On 1/26/2021 patient underwent surgery with removal of 1400 mils of a hematoma no active bleeding and placement of a wound VAC.  He has been undergoing Monday Wednesday Friday wound VAC changes.  He returned to the operating room on 2/3/2020 for skin graft.  He is currently on Coumadin and being bridged with enoxaparin.    Today he being seen to review VS, labs, monitor left leg wound and pain management. He denied CP or shortness of breath, denied cough or congestion, diarrhea or constipation . His pain is controlled.  He no longer has a wound VAC in place and has dressing changes to be done.  His upper thigh donor site is with Tegaderm to be left in place and reinforced with ABD pads.  He also has a left lower leg incision that is stapled closed and then another area that is stapled in a circular  fashion and open in the middle.  No signs or symptoms of infection noted.  His INR is subtherapeutic today and he will continue on enoxaparin injections twice daily until therapeutic.  Previous labs were reviewed.    Past Medical History:   Diagnosis Date     Advanced directives, counseling/discussion 1/6/2012    Discussed Advance Directive planning with patient; information given to patient to review.     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 Provider to review     Leg wound, left 1/9/2021     Long term current use of anticoagulant therapy 8/7/2002    LW Modifier:  Coumadin, since mechanical aortic valve LW Onset:  1992 ; Anticoagulant Rx Long Term Problem list name updated by automated process. Provider to review     Lumbar radiculopathy 4/3/2013     Lumbar surgical wound fluid collection 5/23/2013     Memory difficulties 1/16/2015     Morbid obesity (H) 4/20/2010    LW Modifier:  BMI 41.3 (Apr 2010) ; Obesity Morbid     OAB (overactive bladder) 5/11/2015     Persons encountering health services in other specified circumstances 4/3/2012    Formatting of this note might be different from the original. EMERGENCY CARE PLAN Presenting Problem Signs and Symptoms Treatment Plan   Questions or conerns during  clinic hours    I will call the clinic directly    Questions or conerns outside clinic hours    I will call the 24 hour nurse line at [phone]   Patient needs to schedule an appointment    I will call the 24 hour scheduling team at     Polypharmacy 1/22/2021     Radiculitis 4/20/2010    LW Modifier:  Right foot, s/p R AMANDA LW Onset:  2002 ; Neuralgia     Recurrent major depressive episodes, in full remission (H) 10/30/2012     Renal insufficiency 1/22/2021     Rotator cuff tear 1/26/2015    Rotator cuff tear, right     S/P lumbar fusion 4/5/2018     Spinal stenosis of lumbar region 4/5/2018     Suicide attempt by multiple drug overdose, initial encounter (H) 11/27/2020     Tourette syndrome 10/30/2012             Family History   Problem Relation Age of Onset     Diabetes Mother      Diabetes Father      Social History     Socioeconomic History     Marital status:      Spouse name: Not on file     Number of children: Not on file     Years of education: Not on file     Highest education level: Not on file   Occupational History     Not on file   Social Needs     Financial resource strain: Not on file     Food insecurity     Worry: Not on file     Inability: Not on file     Transportation needs     Medical: Not on file     Non-medical: Not on file   Tobacco Use     Smoking status: Never Smoker     Smokeless tobacco: Never Used   Substance and Sexual Activity     Alcohol use: Not on file     Drug use: Not on file     Sexual activity: Not on file   Lifestyle     Physical activity     Days per week: Not on file     Minutes per session: Not on file     Stress: Not on file   Relationships     Social connections     Talks on phone: Not on file     Gets together: Not on file     Attends Anabaptist service: Not on file     Active member of club or organization: Not on file     Attends meetings of clubs or organizations: Not on file     Relationship status: Not on file     Intimate partner violence     Fear of current  or ex partner: Not on file     Emotionally abused: Not on file     Physically abused: Not on file     Forced sexual activity: Not on file   Other Topics Concern     Not on file   Social History Narrative     Not on file         Review of Systems   Constitutional: Positive for activity change. Negative for appetite change, chills, fatigue and fever.        Weightbearing as tolerated with boot on   HENT: Negative for congestion and sore throat.    Eyes: Negative for visual disturbance.   Respiratory: Negative for cough, shortness of breath and wheezing.    Cardiovascular: Positive for leg swelling. Negative for chest pain.   Gastrointestinal: Negative for abdominal distention, abdominal pain, constipation, diarrhea and nausea.   Genitourinary: Negative for dysuria.   Musculoskeletal: Negative for arthralgias and myalgias.   Skin: Positive for wound. Negative for color change and rash.   Neurological: Negative for dizziness, weakness and numbness.        History of CVA no deficits at this time   Psychiatric/Behavioral: Negative for agitation, behavioral problems and sleep disturbance.       Vitals:    02/09/21 2058   BP: 130/76   Pulse: 68   Resp: 16   Temp: 97.8  F (36.6  C)   SpO2: 95%   Weight: (!) 313 lb (142 kg)       Physical Exam  Constitutional:       Appearance: He is well-developed.      Comments: Pleasant gentleman in no acute distress   HENT:      Head: Normocephalic.   Eyes:      Conjunctiva/sclera: Conjunctivae normal.   Neck:      Musculoskeletal: Normal range of motion.   Cardiovascular:      Rate and Rhythm: Normal rate and regular rhythm.      Heart sounds: Normal heart sounds. No murmur.      Comments: Patient with a mechanical valve  Pulmonary:      Effort: No respiratory distress.      Breath sounds: Normal breath sounds. No wheezing or rales.   Abdominal:      General: Bowel sounds are normal. There is no distension.      Palpations: Abdomen is soft.      Tenderness: There is no abdominal  tenderness.   Musculoskeletal: Normal range of motion.   Skin:     General: Skin is warm.      Comments: Wound left leg.  Donor site left upper thigh.  Graft site left lower leg staples intact no signs or symptoms of infection   Neurological:      Mental Status: He is alert and oriented to person, place, and time.   Psychiatric:         Behavior: Behavior normal.           LABS:   Recent Results (from the past 240 hour(s))   INR   Result Value Ref Range    INR 1.71 (H) 0.90 - 1.10     Current Outpatient Medications   Medication Sig     acetaminophen (TYLENOL) 500 MG tablet Take 500-1,000 mg by mouth every 8 (eight) hours as needed.     azelastine (ASTELIN) 137 mcg (0.1 %) nasal spray 2 sprays into each nostril 2 (two) times a day as needed.     cetirizine (ZYRTEC) 10 MG tablet Take 10 mg by mouth daily as needed.     donepeziL (ARICEPT) 10 MG tablet Take 20 mg by mouth at bedtime.     enoxaparin ANTICOAGULANT (LOVENOX) 150 mg/mL injection Inject 135 mg under the skin every 12 (twelve) hours.     folic acid (FOLVITE) 1 MG tablet Take 5 mg by mouth daily.     guanFACINE (TENEX) 1 MG tablet Take 1 mg by mouth at bedtime.     lamoTRIgine (LAMICTAL) 25 MG tablet Take 25 mg by mouth daily.     levothyroxine (SYNTHROID, LEVOTHROID) 150 MCG tablet Take 150 mcg by mouth daily.     liothyronine (CYTOMEL) 25 MCG tablet Take 25 mcg by mouth daily.     melatonin 3 mg Tab tablet Take 3 mg by mouth at bedtime as needed.     memantine (NAMENDA) 10 MG tablet Take 10 mg by mouth 2 (two) times a day.     mirabegron (MYRBETRIQ) 50 mg Tb24 ER tablet Take 50 mg by mouth daily.     oxyCODONE (ROXICODONE) 5 MG immediate release tablet Take 5 mg by mouth every 4 (four) hours as needed.     pregabalin (LYRICA) 100 MG capsule Take 100 mg by mouth 3 (three) times a day.     propafenone (RYTHMOL) 150 MG tablet Take 150 mg by mouth every 8 (eight) hours as needed.     senna-docusate (SENNOSIDES-DOCUSATE SODIUM) 8.6-50 mg tablet Take 1 tablet by  mouth 2 (two) times a day as needed for constipation.     silver sulfADIAZINE (SILVADENE, SSD) 1 % cream Apply 1 application topically 2 (two) times a day.     simvastatin (ZOCOR) 40 MG tablet Take 40 mg by mouth daily.     venlafaxine (EFFEXOR-XR) 150 MG 24 hr capsule Take 300 mg by mouth daily.     warfarin sodium (WARFARIN ORAL) Take by mouth. 2/9/21 INR 1.71  Warfarin 7.5mg Sun and Thurs and 5mg AOD.  Cont Lovenox 135mg Q 12 hours.  Next INR 2/12/21.     ASSESSMENT:      ICD-10-CM    1. Anemia, unspecified type  D64.9    2. Long term current use of anticoagulant therapy  Z79.01    3. Physical deconditioning  R53.81    4. Wound of left lower extremity, subsequent encounter  S81.673M        PLAN:    Anemia patient did have a large hematoma evacuated on his left lower extremity on 126 of 1400 mls, monitor hemoglobin.  Hemoglobin on 2/6/2021 was 12.6.    Anticoagulation patient on chronic Coumadin for mechanical valve placement in 1992.  INR range 2.5-3.5.  Continue Lovenox twice daily until therapeutic.    Physical deconditioning PT OT    Left leg wound dressing changes per orders follow-up with surgery.  Hematoma evacuated on 1/26/2020 for 1400 mils completed antibiotics    Suspected seizure patient with an abnormal MRI.  Started on lamotrigine per neurology, seizure precautions follow-up with neurology no seizure activity since admission to this facility.  He will need follow-up in 1 to 2 weeks with neurology for an increase in his seizure medication.    Left shoulder pain orthopedics was consulted no fracture patient did undergo a subacromial injection follow-up with [facility] for further work-up if pain persists    Pain management scheduled Tylenol and as needed oxycodone, on Lyrica    Atrial fibrillation patient on Coumadin being bridged with enoxaparin and also on propafenone    Insomnia on melatonin    Hypothyroidism continue Synthroid and liothyronine TSH on 11/30/2020 was 2.17    Depression  continue Effexor    Dementia on Namenda and Aricept.    Electronically signed by: Era Eisenberg CNP

## 2021-06-15 NOTE — TELEPHONE ENCOUNTER
[facility] Nurse Triage Anticoagulation Note      Provider: MARLEY Enriquez  Facility: [facility]    Facility Type: TCU    Caller: Magui  Call Back Number:  [phone]    Reason for call: INR    Today s INR: 2.81  Previous INR: 2/15 2.65(7.5mg Sun and Thurs and 5mg AOD).      Diagnosis/Goal: Mechanical Valve  Heparin/Lovenox: No   Currently on ABX: No  Other interacting Medications: None  Missed or refused doses: No    Verbal Order/Direction given by Provider: Continue Warfarin 7.5mg Sundays and Thursdays and 5mg all other days.  Next INR 2/24/21 with home care RN.      Provider giving order: MARLEY Enriquez    Verbal order given to: Magui Guerrero RN

## 2021-06-15 NOTE — PROGRESS NOTES
[facility]    Facility:   [facility] [[id number]]   Code Status: FULL CODE      CHIEF COMPLAINT/REASON FOR VISIT:  Chief Complaint   Patient presents with     Review Of Multiple Medical Conditions     Reviewed vital signs follow-up weakness and left leg wound       HISTORY:      HPI: Nicko is a 65 y.o. male undergoing physical and occupational therapy at [facility].  He is with past medical history of aortic dissection status post mechanical aortic valve plus graft 1992, atrial fibrillation currently on Coumadin, chronic pain, dementia and depression who was hospitalized [address] for left leg wound with drainage initially caused by a fall approximately 1 month prior.  His wound was found to be infected with a hematoma requiring an I&D on 1/10/2021.  He was then discharged home on oral antibiotics.  He was readmitted on 1/22/2021 with generalized weakness confusion and hypotension.  Found to have an acute CVA but no longer has any focal deficits.  Per the records it appears she was also likely to have a seizure prior to the admission.  On 1/26/2021 patient underwent surgery with removal of 1400 mils of a hematoma no active bleeding and placement of a wound VAC.  He has been undergoing Monday Wednesday Friday wound VAC changes.  He returned to the operating room on 2/3/2020 for skin graft.  He is currently on Coumadin and being bridged with enoxaparin.    Today he being seen to review VS, labs, monitor left leg wound and pain management. He denied CP or shortness of breath, denied cough or congestion, diarrhea or constipation . His pain is controlled.  He no longer has a wound VAC in place and has dressing changes to be done.  His upper thigh donor site is with Tegaderm to be left in place and reinforced with ABD pads.  He also has a left lower leg incision that is stapled closed and then another area that is stapled in a circular fashion and open in the middle.   No signs or symptoms of infection noted.  He will discharge at the end of the week.  His INR is now therapeutic between 2.5 and 3.5 and his Lovenox has been discontinued.    Past Medical History:   Diagnosis Date     Advanced directives, counseling/discussion 1/6/2012    Discussed Advance Directive planning with patient; information given to patient to review.     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 Provider to review     Leg wound, left 1/9/2021     Long term current use of anticoagulant therapy 8/7/2002    LW Modifier:  Coumadin, since mechanical aortic valve LW Onset:  1992 ; Anticoagulant Rx Long Term Problem list name updated by automated process. Provider to review     Lumbar radiculopathy 4/3/2013     Lumbar surgical wound fluid collection 5/23/2013     Memory difficulties 1/16/2015     Morbid obesity (H) 4/20/2010    LW Modifier:  BMI 41.3 (Apr 2010) ; Obesity Morbid     OAB (overactive bladder) 5/11/2015     Persons encountering health services in other specified circumstances 4/3/2012    Formatting of this note might be different from the original. EMERGENCY CARE PLAN Presenting Problem Signs and Symptoms Treatment Plan   Questions or conerns during clinic hours    I will call the clinic  directly    Questions or conerns outside clinic hours    I will call the 24 hour nurse line at [phone]   Patient needs to schedule an appointment    I will call the 24 hour scheduling team at     Polypharmacy 1/22/2021     Radiculitis 4/20/2010    LW Modifier:  Right foot, s/p R AMANDA LW Onset:  2002 ; Neuralgia     Recurrent major depressive episodes, in full remission (H) 10/30/2012     Renal insufficiency 1/22/2021     Rotator cuff tear 1/26/2015    Rotator cuff tear, right     S/P lumbar fusion 4/5/2018     Spinal stenosis of lumbar region 4/5/2018     Suicide attempt by multiple drug overdose, initial encounter (H) 11/27/2020     Tourette syndrome 10/30/2012             Family History   Problem Relation Age of Onset     Diabetes Mother      Diabetes Father      Social History     Socioeconomic History     Marital status:      Spouse name: Not on file     Number of children: Not on file     Years of education: Not on file     Highest education level: Not on file   Occupational History     Not on file   Social Needs     Financial resource strain: Not on file     Food insecurity     Worry: Not on file     Inability: Not on file     Transportation needs     Medical: Not on file     Non-medical: Not on file   Tobacco Use     Smoking status: Never Smoker     Smokeless tobacco: Never Used   Substance and Sexual Activity     Alcohol use: Not on file     Drug use: Not on file     Sexual activity: Not on file   Lifestyle     Physical activity     Days per week: Not on file     Minutes per session: Not on file     Stress: Not on file   Relationships     Social connections     Talks on phone: Not on file     Gets together: Not on file     Attends Buddhism service: Not on file     Active member of club or organization: Not on file     Attends meetings of clubs or organizations: Not on file     Relationship status: Not on file     Intimate partner violence     Fear of current or ex partner: Not on file      Emotionally abused: Not on file     Physically abused: Not on file     Forced sexual activity: Not on file   Other Topics Concern     Not on file   Social History Narrative     Not on file         Review of Systems   Constitutional: Positive for activity change. Negative for appetite change, chills, fatigue and fever.        Weightbearing as tolerated with boot on   HENT: Negative for congestion and sore throat.    Eyes: Negative for visual disturbance.   Respiratory: Negative for cough, shortness of breath and wheezing.    Cardiovascular: Positive for leg swelling. Negative for chest pain.   Gastrointestinal: Negative for abdominal distention, abdominal pain, constipation, diarrhea and nausea.   Genitourinary: Negative for dysuria.   Musculoskeletal: Negative for arthralgias and myalgias.   Skin: Positive for wound. Negative for color change and rash.   Neurological: Negative for dizziness, weakness and numbness.        History of CVA no deficits at this time   Psychiatric/Behavioral: Negative for agitation, behavioral problems and sleep disturbance.       Vitals:    02/15/21 1958   BP: (!) 138/98   Pulse: 68   Resp: 18   Temp: 97.6  F (36.4  C)   SpO2: 97%   Weight: (!) 313 lb (142 kg)       Physical Exam  Constitutional:       Appearance: He is well-developed.      Comments: Pleasant gentleman in no acute distress   HENT:      Head: Normocephalic.   Eyes:      Conjunctiva/sclera: Conjunctivae normal.   Neck:      Musculoskeletal: Normal range of motion.   Cardiovascular:      Rate and Rhythm: Normal rate and regular rhythm.      Heart sounds: Normal heart sounds. No murmur.      Comments: Patient with a mechanical valve  Pulmonary:      Effort: No respiratory distress.      Breath sounds: Normal breath sounds. No wheezing or rales.   Abdominal:      General: Bowel sounds are normal. There is no distension.      Palpations: Abdomen is soft.      Tenderness: There is no abdominal tenderness.   Musculoskeletal:  Normal range of motion.   Skin:     General: Skin is warm.      Comments: Wound left leg.  Donor site left upper thigh.  Graft site left lower leg staples intact no signs or symptoms of infection   Neurological:      Mental Status: He is alert and oriented to person, place, and time.   Psychiatric:         Behavior: Behavior normal.           LABS:   Recent Results (from the past 240 hour(s))   INR   Result Value Ref Range    INR 1.71 (H) 0.90 - 1.10   INR   Result Value Ref Range    INR 1.97 (H) 0.90 - 1.10   INR   Result Value Ref Range    INR 2.65 (H) 0.90 - 1.10   HM1 (CBC with Diff)   Result Value Ref Range    WBC 7.3 4.0 - 11.0 thou/uL    RBC 4.34 (L) 4.40 - 6.20 mill/uL    Hemoglobin 13.4 (L) 14.0 - 18.0 g/dL    Hematocrit 40.7 40.0 - 54.0 %    MCV 94 80 - 100 fL    MCH 30.9 27.0 - 34.0 pg    MCHC 32.9 32.0 - 36.0 g/dL    RDW 13.5 11.0 - 14.5 %    Platelets 232 140 - 440 thou/uL    MPV 11.7 8.5 - 12.5 fL    Neutrophils % 62 50 - 70 %    Lymphocytes % 26 20 - 40 %    Monocytes % 9 2 - 10 %    Eosinophils % 3 0 - 6 %    Basophils % 1 0 - 2 %    Immature Granulocyte % 0 <=0 %    Neutrophils Absolute 4.5 2.0 - 7.7 thou/uL    Lymphocytes Absolute 1.9 0.8 - 4.4 thou/uL    Monocytes Absolute 0.7 0.0 - 0.9 thou/uL    Eosinophils Absolute 0.2 0.0 - 0.4 thou/uL    Basophils Absolute 0.0 0.0 - 0.2 thou/uL    Immature Granulocyte Absolute 0.0 <=0.0 thou/uL     Current Outpatient Medications   Medication Sig     acetaminophen (TYLENOL) 500 MG tablet Take 1,000 mg by mouth 3 (three) times a day. And 1000mg daily PRN, max 4gm/day     azelastine (ASTELIN) 137 mcg (0.1 %) nasal spray 2 sprays into each nostril 2 (two) times a day as needed.     cetirizine (ZYRTEC) 10 MG tablet Take 10 mg by mouth daily as needed.     donepeziL (ARICEPT) 10 MG tablet Take 20 mg by mouth at bedtime.     folic acid (FOLVITE) 1 MG tablet Take 5 mg by mouth daily.     guanFACINE (TENEX) 1 MG tablet Take 1 mg by mouth at bedtime.     lamoTRIgine  (LAMICTAL) 25 MG tablet Take 25 mg by mouth daily.     levothyroxine (SYNTHROID, LEVOTHROID) 150 MCG tablet Take 150 mcg by mouth daily.     liothyronine (CYTOMEL) 25 MCG tablet Take 25 mcg by mouth daily.     melatonin 3 mg Tab tablet Take 3 mg by mouth at bedtime as needed.     memantine (NAMENDA) 10 MG tablet Take 10 mg by mouth 2 (two) times a day.     mirabegron (MYRBETRIQ) 50 mg Tb24 ER tablet Take 50 mg by mouth daily.     oxyCODONE (ROXICODONE) 5 MG immediate release tablet Take 5 mg by mouth every 4 (four) hours as needed.     pregabalin (LYRICA) 100 MG capsule Take 100 mg by mouth 3 (three) times a day.     propafenone (RYTHMOL) 150 MG tablet Take 150 mg by mouth every 8 (eight) hours as needed.     senna-docusate (SENNOSIDES-DOCUSATE SODIUM) 8.6-50 mg tablet Take 1 tablet by mouth 2 (two) times a day as needed for constipation.     silver sulfADIAZINE (SILVADENE, SSD) 1 % cream Apply 1 application topically 2 (two) times a day.     simvastatin (ZOCOR) 40 MG tablet Take 40 mg by mouth daily.     venlafaxine (EFFEXOR-XR) 150 MG 24 hr capsule Take 300 mg by mouth daily.     warfarin sodium (WARFARIN ORAL) Take by mouth. 2/15/21 INR 2.65  Take 7.5mg Sun and Thurs and 5mg AOD.  Next INR 2/19/21.    2/12/21 INR 1.97 Give 6mg tonight & tomorrow, 7.5mg on Sun. Continue Lovenox. Next INR 2/15.  2/9/21 INR 1.71  Warfarin 7.5mg Sun and Thurs and 5mg AOD.  Cont Lovenox 135mg Q 12 hours.  Next INR 2/12/21.     ASSESSMENT:      ICD-10-CM    1. Wound of left lower extremity, subsequent encounter  S81.802D    2. Physical deconditioning  R53.81    3. Pain management  R52        PLAN:    Anemia patient did have a large hematoma evacuated on his left lower extremity on 126 of 1400 mls, monitor hemoglobin.  Hemoglobin on 2/16/2021 was 13.4 which is up from 12.6.    Anticoagulation patient on chronic Coumadin for mechanical valve placement in 1992.  INR range 2.5-3.5.  INR on 2/15/2021 was 2.65 Lovenox has been  discontinued    Physical deconditioning PT OT    Left leg wound dressing changes per orders follow-up with surgery.  Hematoma evacuated on 1/26/2020 for 1400 mils completed antibiotics    Suspected seizure patient with an abnormal MRI.  Started on lamotrigine per neurology, seizure precautions follow-up with neurology no seizure activity since admission to this facility.  He will need follow-up in 1 to 2 weeks with neurology for an increase in his seizure medication.    Left shoulder pain orthopedics was consulted no fracture patient did undergo a subacromial injection follow-up with [facility] for further work-up if pain persists    Pain management scheduled Tylenol and as needed oxycodone, on Lyrica    Atrial fibrillation patient on Coumadin  and also on propafenone.  Lovenox has been DC'd    Insomnia on melatonin    Hypothyroidism continue Synthroid and liothyronine TSH on 11/30/2020 was 2.17    Depression continue Effexor    Dementia on Namenda and Aricept.    Electronically signed by: Era Eisenberg CNP

## 2021-06-15 NOTE — PROGRESS NOTES
[facility]      Facility:    [facility] [[id number]]  Code Status: FULL CODE      Chief Complaint/Reason for Visit:   Chief Complaint   Patient presents with     Review Of Multiple Medical Conditions     s/p skin graft       HPI:   Nicko is a 65 y.o. male who is a transfer from [facility] with an admission on 1/22/21 and discharge on 2/7/21. He has a PMH of A-fib on coumadin with MAV, hypothyroidism, depression, dementia, who was hospitalized 1/9-1/13/21 there for left leg wound with drainage requiring I&D (initially from a fall) per hematoma formation and then was discharged home. He was then re-admitted with generalized weakness, confusion and hypotension. He was also found to have an acute CVA w/o local deficits and had a seizure prior to admission (apparently right arm shaking with unresponsiveness). Per CT imaging found to have right occipital infarct and MRI showed an acute infarct in the right frontal vertex and multiple chronic infarcts in the right occipital lobe and bilateral cerebellar hemispheres. Further, MRA showed possible diffuse stenosis of the left PCA. TTE unchanged.  Neuro consulted, started on lamotrigine and will f/u with neuro outpatient. Per chart review during prior hospitalization initially found to have infected hematoma, underwent I&D on 1/10, wound grew E. Coli, given rocephin and discharged on cefuroxime per ID. He then had a dehiscence of wound with necrosis, underwent surgery with plastics on 1/26 and had 1.4L blood removal with VAC placement with changes on M/W/Fr. He then had skin graft placement on 2/3/21 with ongoing VAC usage. Of note he was supra therapeutic, required vit K prior to surgery as well. He also developed CHARISSA with peak Cr of 1.66, which normalized. He was then discharged to the TCU for rehab.    Past Medical History:  Past Medical History:   Diagnosis Date     Advanced directives,  counseling/discussion 1/6/2012    Discussed Advance Directive planning with patient; information given to patient to review.     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 Provider to review     Leg wound, left 1/9/2021     Long term current use of anticoagulant therapy 8/7/2002    LW Modifier:  Coumadin, since mechanical aortic valve LW Onset:  1992 ; Anticoagulant Rx Long Term Problem list name updated by automated process. Provider to review     Lumbar radiculopathy 4/3/2013     Lumbar surgical wound fluid collection 5/23/2013     Memory difficulties 1/16/2015     Morbid obesity (H) 4/20/2010    LW Modifier:  BMI 41.3 (Apr 2010) ; Obesity Morbid     OAB (overactive bladder) 5/11/2015     Persons encountering health services in other specified circumstances 4/3/2012    Formatting of this note might be different from the original. EMERGENCY CARE PLAN Presenting Problem Signs and Symptoms Treatment Plan   Questions or conerns during clinic hours    I will call the clinic directly    Questions or conerns outside clinic hours    I will call the 24 hour nurse line at [phone]   Patient needs to schedule an appointment    I will call the 24 hour scheduling team at     Polypharmacy 1/22/2021     Radiculitis  4/20/2010    LW Modifier:  Right foot, s/p R AMANDA LW Onset:  2002 ; Neuralgia     Recurrent major depressive episodes, in full remission (H) 10/30/2012     Renal insufficiency 1/22/2021     Rotator cuff tear 1/26/2015    Rotator cuff tear, right     S/P lumbar fusion 4/5/2018     Spinal stenosis of lumbar region 4/5/2018     Suicide attempt by multiple drug overdose, initial encounter (H) 11/27/2020     Tourette syndrome 10/30/2012           Surgical History:  Past Surgical History:   Procedure Laterality Date     (IA) MN NEE SCOPE MED W LAT MENISCECT WWO DEBRIBE/SHAVE ANY CO       (IA) MN TOTAL HIP ARTHROPLASTY       ELBOW SURGERY       MN UNLISTED NECK/THORAX       MN UNLISTED ORBIT       MN UNLISTED SPINE         Family History:   Family History   Problem Relation Age of Onset     Diabetes Mother      Diabetes Father        Social History:    Social History     Socioeconomic History     Marital status:      Spouse name: Not on file     Number of children: Not on file     Years of education: Not on file     Highest education level: Not on file   Occupational History     Not on file   Social Needs     Financial resource strain: Not on file     Food insecurity     Worry: Not on file     Inability: Not on file     Transportation needs     Medical: Not on file     Non-medical: Not on file   Tobacco Use     Smoking status: Never Smoker     Smokeless tobacco: Never Used   Substance and Sexual Activity     Alcohol use: Not on file     Drug use: Not on file     Sexual activity: Not on file   Lifestyle     Physical activity     Days per week: Not on file     Minutes per session: Not on file     Stress: Not on file   Relationships     Social connections     Talks on phone: Not on file     Gets together: Not on file     Attends Hinduism service: Not on file     Active member of club or organization: Not on file     Attends meetings of clubs or organizations: Not on file     Relationship status: Not on file      Intimate partner violence     Fear of current or ex partner: Not on file     Emotionally abused: Not on file     Physically abused: Not on file     Forced sexual activity: Not on file   Other Topics Concern     Not on file   Social History Narrative     Not on file          Review of Systems   Constitutional: Positive for activity change and fatigue. Negative for appetite change, chills, diaphoresis and fever.   HENT: Negative for congestion and hearing loss.    Eyes: Negative.    Respiratory: Negative for shortness of breath.    Cardiovascular: Negative.    Gastrointestinal: Negative for abdominal distention, abdominal pain, blood in stool, constipation, diarrhea and nausea.   Endocrine: Negative.    Genitourinary: Negative.    Musculoskeletal:        L LE has periodic pain, elevated   Skin: Positive for wound.        L LE with VAC   Allergic/Immunologic: Negative.    Neurological: Negative.    Psychiatric/Behavioral: Negative for agitation, confusion and sleep disturbance. The patient is not hyperactive.        There were no vitals filed for this visit.    Physical Exam  Constitutional:       Appearance: He is obese.   HENT:      Head: Normocephalic.      Right Ear: External ear normal.      Left Ear: External ear normal.      Nose: No congestion.      Mouth/Throat:      Pharynx: No oropharyngeal exudate.   Eyes:      General:         Right eye: No discharge.         Left eye: No discharge.   Neck:      Musculoskeletal: Normal range of motion.   Cardiovascular:      Rate and Rhythm: Rhythm irregular.      Comments: A-fib  Pulmonary:      Effort: Pulmonary effort is normal.      Comments: CTA, RA  Abdominal:      General: There is no distension.      Palpations: Abdomen is soft.      Comments: Denies constipation or diarrhea   Genitourinary:     Comments: No issues reported  Musculoskeletal:      Right lower leg: No edema.      Left lower leg: Edema present.      Comments: LLE pain, elevated, WBAT in boot   Skin:      Comments: L LE   Neurological:      Mental Status: He is oriented to person, place, and time.   Psychiatric:         Mood and Affect: Mood normal.         Medication List:  Current Outpatient Medications   Medication Sig     acetaminophen (TYLENOL) 500 MG tablet Take 1,000 mg by mouth 3 (three) times a day. And 1000mg daily PRN, max 4gm/day     azelastine (ASTELIN) 137 mcg (0.1 %) nasal spray 2 sprays into each nostril 2 (two) times a day as needed.     cetirizine (ZYRTEC) 10 MG tablet Take 10 mg by mouth daily as needed.     donepeziL (ARICEPT) 10 MG tablet Take 20 mg by mouth at bedtime.     enoxaparin ANTICOAGULANT (LOVENOX) 150 mg/mL injection Inject 135 mg under the skin every 12 (twelve) hours.     folic acid (FOLVITE) 1 MG tablet Take 5 mg by mouth daily.     guanFACINE (TENEX) 1 MG tablet Take 1 mg by mouth at bedtime.     lamoTRIgine (LAMICTAL) 25 MG tablet Take 25 mg by mouth daily.     levothyroxine (SYNTHROID, LEVOTHROID) 150 MCG tablet Take 150 mcg by mouth daily.     liothyronine (CYTOMEL) 25 MCG tablet Take 25 mcg by mouth daily.     melatonin 3 mg Tab tablet Take 3 mg by mouth at bedtime as needed.     memantine (NAMENDA) 10 MG tablet Take 10 mg by mouth 2 (two) times a day.     mirabegron (MYRBETRIQ) 50 mg Tb24 ER tablet Take 50 mg by mouth daily.     oxyCODONE (ROXICODONE) 5 MG immediate release tablet Take 5 mg by mouth every 4 (four) hours as needed.     pregabalin (LYRICA) 100 MG capsule Take 100 mg by mouth 3 (three) times a day.     propafenone (RYTHMOL) 150 MG tablet Take 150 mg by mouth every 8 (eight) hours as needed.     senna-docusate (SENNOSIDES-DOCUSATE SODIUM) 8.6-50 mg tablet Take 1 tablet by mouth 2 (two) times a day as needed for constipation.     silver sulfADIAZINE (SILVADENE, SSD) 1 % cream Apply 1 application topically 2 (two) times a day.     simvastatin (ZOCOR) 40 MG tablet Take 40 mg by mouth daily.     venlafaxine (EFFEXOR-XR) 150 MG 24 hr capsule Take 300 mg by mouth  daily.     warfarin sodium (WARFARIN ORAL) Take by mouth. 2/9/21 INR 1.71  Warfarin 7.5mg Sun and Thurs and 5mg AOD.  Cont Lovenox 135mg Q 12 hours.  Next INR 2/12/21.       Labs:  No results found for this or any previous visit.      Assessment/Plan:    Left lower extremity s/p hematoma evacuation and I&D per non healing cellulitis s/p VAC and skin graft placement on 2/3/21: continue VAC, drsg changes as ordered, f/u with Dr Plascencia as ordered    Suspected seizure: prior to hospitalization, had R arm shaking and unresponsiveness, neuro started lamotrigine 25mg daily, will f/u and then increase to 50mg daily and have EEG    Acute right frontal infarct, silent, no neurological deficits: continue statin, anticoagulation, f/u with neurology in 8 wks    Pain control: increase tylenol to 1000mg three times a day and daily PRN, continue oxycodone 5mg q4h PRN (ususally  2x/day) and lyrica 100mg TID    A-fib with MAV: continue coumadin 7.5mg Th/Sat and 5mg AOD, recheck INR on 2/12, continue lovenox until 2.5-3.5    Dementia: continue donepezil 20mg at HS and namenda 10mg two times a day    CLIF: last Cr 1.11 with GFR >60 on 2/1/21    Normocytic anemia: last Hgb 12.6 on 2/6/21    Hypothyroidism: continue synthroid 150mcg daily and cytomel  25mg daily, last TSH 2.17    OAB: continue mirabegron 50mg daily    Constipation: continue senna S PRN    Depression: continue Effexor 300mg daily    Insomnia: continue melatonin 3mg at HS PRN, does not use    Labs: BMP, CBC, vit D, B12    Disposition: lives with wife. KASHIF 22/30    The care plan has been reviewed and all orders signed. Changes to care plan, if any, as noted. Otherwise, continue care plan of care.  The total time spent with this patient was 38 minutes, with greater than 50% spent in counseling and coordination of care that included multiple issues per extensive surgical hx, f/u with neuro/ortho, pain control and therapy, which lasted 20 minutes.      Electronically signed  by: Kem Matta, NP

## 2021-06-18 ENCOUNTER — HOSPITAL ENCOUNTER (OUTPATIENT)
Facility: CLINIC | Age: 66
Setting detail: OBSERVATION
Discharge: HOME | End: 2021-06-20
Attending: EMERGENCY MEDICINE | Admitting: INTERNAL MEDICINE
Payer: COMMERCIAL

## 2021-06-18 ENCOUNTER — APPOINTMENT (OUTPATIENT)
Dept: CT IMAGING | Facility: CLINIC | Age: 66
End: 2021-06-18
Attending: EMERGENCY MEDICINE
Payer: COMMERCIAL

## 2021-06-18 ENCOUNTER — ANTICOAGULATION THERAPY VISIT (OUTPATIENT)
Dept: INTERNAL MEDICINE | Facility: CLINIC | Age: 66
End: 2021-06-18

## 2021-06-18 DIAGNOSIS — I48.20 CHRONIC ATRIAL FIBRILLATION (H): ICD-10-CM

## 2021-06-18 DIAGNOSIS — Z95.2 AORTIC VALVE PROSTHESIS PRESENT: Primary | ICD-10-CM

## 2021-06-18 DIAGNOSIS — Z95.2 AORTIC VALVE PROSTHESIS PRESENT: ICD-10-CM

## 2021-06-18 DIAGNOSIS — R20.2 PARESTHESIAS: ICD-10-CM

## 2021-06-18 DIAGNOSIS — Z79.01 LONG TERM CURRENT USE OF ANTICOAGULANT THERAPY: ICD-10-CM

## 2021-06-18 DIAGNOSIS — I48.91 ATRIAL FIBRILLATION (H): ICD-10-CM

## 2021-06-18 DIAGNOSIS — R68.89 SPELLS OF DECREASED ATTENTIVENESS: ICD-10-CM

## 2021-06-18 PROBLEM — Z80.42 FAMILY HISTORY OF PROSTATE CANCER: Status: RESOLVED | Noted: 2020-01-22 | Resolved: 2021-06-18

## 2021-06-18 PROBLEM — R53.81 PHYSICAL DECONDITIONING: Status: RESOLVED | Noted: 2021-02-09 | Resolved: 2021-06-18

## 2021-06-18 PROBLEM — M62.81 GENERALIZED MUSCLE WEAKNESS: Status: RESOLVED | Noted: 2021-01-22 | Resolved: 2021-06-18

## 2021-06-18 PROBLEM — D68.9 COAGULATION DEFECT, UNSPECIFIED (H): Status: RESOLVED | Noted: 2021-04-30 | Resolved: 2021-06-18

## 2021-06-18 LAB
ANION GAP SERPL CALCULATED.3IONS-SCNC: 3 MMOL/L (ref 3–14)
APTT PPP: 55 SEC (ref 22–37)
BASOPHILS # BLD AUTO: 0 10E9/L (ref 0–0.2)
BASOPHILS NFR BLD AUTO: 0.6 %
BUN SERPL-MCNC: 18 MG/DL (ref 7–30)
CALCIUM SERPL-MCNC: 9 MG/DL (ref 8.5–10.1)
CHLORIDE SERPL-SCNC: 108 MMOL/L (ref 94–109)
CO2 SERPL-SCNC: 29 MMOL/L (ref 20–32)
CREAT SERPL-MCNC: 1.02 MG/DL (ref 0.66–1.25)
DIFFERENTIAL METHOD BLD: NORMAL
EOSINOPHIL # BLD AUTO: 0.1 10E9/L (ref 0–0.7)
EOSINOPHIL NFR BLD AUTO: 2.2 %
ERYTHROCYTE [DISTWIDTH] IN BLOOD BY AUTOMATED COUNT: 14.9 % (ref 10–15)
GFR SERPL CREATININE-BSD FRML MDRD: 77 ML/MIN/{1.73_M2}
GLUCOSE SERPL-MCNC: 90 MG/DL (ref 70–99)
HCT VFR BLD AUTO: 42.2 % (ref 40–53)
HGB BLD-MCNC: 13.9 G/DL (ref 13.3–17.7)
IMM GRANULOCYTES # BLD: 0 10E9/L (ref 0–0.4)
IMM GRANULOCYTES NFR BLD: 0.3 %
INR PPP: 3.88 (ref 0.86–1.14)
INR PPP: 4.5 (ref 0.9–1.1)
INTERPRETATION ECG - MUSE: NORMAL
LABORATORY COMMENT REPORT: NORMAL
LYMPHOCYTES # BLD AUTO: 2.2 10E9/L (ref 0.8–5.3)
LYMPHOCYTES NFR BLD AUTO: 34 %
MCH RBC QN AUTO: 29.6 PG (ref 26.5–33)
MCHC RBC AUTO-ENTMCNC: 32.9 G/DL (ref 31.5–36.5)
MCV RBC AUTO: 90 FL (ref 78–100)
MONOCYTES # BLD AUTO: 1 10E9/L (ref 0–1.3)
MONOCYTES NFR BLD AUTO: 14.7 %
NEUTROPHILS # BLD AUTO: 3.1 10E9/L (ref 1.6–8.3)
NEUTROPHILS NFR BLD AUTO: 48.2 %
NRBC # BLD AUTO: 0 10*3/UL
NRBC BLD AUTO-RTO: 0 /100
PLATELET # BLD AUTO: 263 10E9/L (ref 150–450)
POTASSIUM SERPL-SCNC: 4.1 MMOL/L (ref 3.4–5.3)
RBC # BLD AUTO: 4.69 10E12/L (ref 4.4–5.9)
SARS-COV-2 RNA RESP QL NAA+PROBE: NEGATIVE
SODIUM SERPL-SCNC: 140 MMOL/L (ref 133–144)
SPECIMEN SOURCE: NORMAL
TROPONIN I SERPL-MCNC: <0.015 UG/L (ref 0–0.04)
WBC # BLD AUTO: 6.5 10E9/L (ref 4–11)

## 2021-06-18 PROCEDURE — 250N000009 HC RX 250: Performed by: EMERGENCY MEDICINE

## 2021-06-18 PROCEDURE — 99285 EMERGENCY DEPT VISIT HI MDM: CPT | Mod: 25 | Performed by: INTERNAL MEDICINE

## 2021-06-18 PROCEDURE — 93005 ELECTROCARDIOGRAM TRACING: CPT | Performed by: INTERNAL MEDICINE

## 2021-06-18 PROCEDURE — 87635 SARS-COV-2 COVID-19 AMP PRB: CPT | Performed by: EMERGENCY MEDICINE

## 2021-06-18 PROCEDURE — 80048 BASIC METABOLIC PNL TOTAL CA: CPT | Performed by: EMERGENCY MEDICINE

## 2021-06-18 PROCEDURE — 70496 CT ANGIOGRAPHY HEAD: CPT

## 2021-06-18 PROCEDURE — 250N000013 HC RX MED GY IP 250 OP 250 PS 637: Performed by: INTERNAL MEDICINE

## 2021-06-18 PROCEDURE — 84484 ASSAY OF TROPONIN QUANT: CPT | Performed by: EMERGENCY MEDICINE

## 2021-06-18 PROCEDURE — G0378 HOSPITAL OBSERVATION PER HR: HCPCS

## 2021-06-18 PROCEDURE — 85730 THROMBOPLASTIN TIME PARTIAL: CPT | Performed by: EMERGENCY MEDICINE

## 2021-06-18 PROCEDURE — 99220 PR INITIAL OBSERVATION CARE,LEVEL III: CPT | Performed by: INTERNAL MEDICINE

## 2021-06-18 PROCEDURE — 250N000011 HC RX IP 250 OP 636: Performed by: EMERGENCY MEDICINE

## 2021-06-18 PROCEDURE — 70450 CT HEAD/BRAIN W/O DYE: CPT

## 2021-06-18 PROCEDURE — C9803 HOPD COVID-19 SPEC COLLECT: HCPCS

## 2021-06-18 PROCEDURE — 85610 PROTHROMBIN TIME: CPT | Performed by: EMERGENCY MEDICINE

## 2021-06-18 PROCEDURE — 85025 COMPLETE CBC W/AUTO DIFF WBC: CPT | Performed by: EMERGENCY MEDICINE

## 2021-06-18 RX ORDER — LEVOTHYROXINE SODIUM 150 UG/1
150 TABLET ORAL DAILY
Status: DISCONTINUED | OUTPATIENT
Start: 2021-06-19 | End: 2021-06-20 | Stop reason: HOSPADM

## 2021-06-18 RX ORDER — ONDANSETRON 2 MG/ML
4 INJECTION INTRAMUSCULAR; INTRAVENOUS EVERY 6 HOURS PRN
Status: DISCONTINUED | OUTPATIENT
Start: 2021-06-18 | End: 2021-06-20 | Stop reason: HOSPADM

## 2021-06-18 RX ORDER — ONDANSETRON 4 MG/1
4 TABLET, ORALLY DISINTEGRATING ORAL EVERY 6 HOURS PRN
Status: DISCONTINUED | OUTPATIENT
Start: 2021-06-18 | End: 2021-06-20 | Stop reason: HOSPADM

## 2021-06-18 RX ORDER — ACETAMINOPHEN 650 MG/1
650 SUPPOSITORY RECTAL EVERY 4 HOURS PRN
Status: DISCONTINUED | OUTPATIENT
Start: 2021-06-18 | End: 2021-06-20 | Stop reason: HOSPADM

## 2021-06-18 RX ORDER — DIAZEPAM 5 MG
5 TABLET ORAL EVERY 12 HOURS PRN
Status: DISCONTINUED | OUTPATIENT
Start: 2021-06-18 | End: 2021-06-20 | Stop reason: HOSPADM

## 2021-06-18 RX ORDER — VENLAFAXINE HYDROCHLORIDE 150 MG/1
150 CAPSULE, EXTENDED RELEASE ORAL EVERY EVENING
COMMUNITY
End: 2021-08-13

## 2021-06-18 RX ORDER — LAMOTRIGINE 25 MG/1
25 TABLET ORAL 2 TIMES DAILY
COMMUNITY
End: 2021-12-07

## 2021-06-18 RX ORDER — PROPAFENONE HYDROCHLORIDE 150 MG/1
150 TABLET, COATED ORAL EVERY 8 HOURS
Status: DISCONTINUED | OUTPATIENT
Start: 2021-06-18 | End: 2021-06-20 | Stop reason: HOSPADM

## 2021-06-18 RX ORDER — VENLAFAXINE HYDROCHLORIDE 150 MG/1
150 CAPSULE, EXTENDED RELEASE ORAL EVERY EVENING
Status: DISCONTINUED | OUTPATIENT
Start: 2021-06-18 | End: 2021-06-20 | Stop reason: HOSPADM

## 2021-06-18 RX ORDER — DONEPEZIL HYDROCHLORIDE 10 MG/1
10 TABLET, FILM COATED ORAL AT BEDTIME
Status: DISCONTINUED | OUTPATIENT
Start: 2021-06-18 | End: 2021-06-20 | Stop reason: HOSPADM

## 2021-06-18 RX ORDER — WARFARIN SODIUM 2.5 MG/1
7.5 TABLET ORAL DAILY
COMMUNITY
End: 2021-08-05

## 2021-06-18 RX ORDER — WARFARIN SODIUM 5 MG/1
TABLET ORAL DAILY
COMMUNITY
End: 2023-12-12

## 2021-06-18 RX ORDER — MIRABEGRON 50 MG/1
50 TABLET, EXTENDED RELEASE ORAL EVERY EVENING
COMMUNITY
End: 2022-02-01

## 2021-06-18 RX ORDER — ACETAMINOPHEN 325 MG/1
650 TABLET ORAL EVERY 4 HOURS PRN
Status: DISCONTINUED | OUTPATIENT
Start: 2021-06-18 | End: 2021-06-20 | Stop reason: HOSPADM

## 2021-06-18 RX ORDER — CYCLOBENZAPRINE HCL 10 MG
10 TABLET ORAL 3 TIMES DAILY
COMMUNITY
End: 2021-06-24

## 2021-06-18 RX ORDER — DONEPEZIL HYDROCHLORIDE 10 MG/1
10 TABLET, FILM COATED ORAL AT BEDTIME
COMMUNITY
End: 2021-11-15

## 2021-06-18 RX ORDER — ACETAMINOPHEN 500 MG
1000 TABLET ORAL 2 TIMES DAILY
COMMUNITY

## 2021-06-18 RX ORDER — LIOTHYRONINE SODIUM 25 UG/1
25 TABLET ORAL DAILY
Status: DISCONTINUED | OUTPATIENT
Start: 2021-06-19 | End: 2021-06-20 | Stop reason: HOSPADM

## 2021-06-18 RX ORDER — MIRABEGRON 50 MG/1
50 TABLET, EXTENDED RELEASE ORAL EVERY EVENING
Status: DISCONTINUED | OUTPATIENT
Start: 2021-06-18 | End: 2021-06-20 | Stop reason: HOSPADM

## 2021-06-18 RX ORDER — LAMOTRIGINE 25 MG/1
25 TABLET ORAL DAILY
Status: DISCONTINUED | OUTPATIENT
Start: 2021-06-19 | End: 2021-06-20 | Stop reason: HOSPADM

## 2021-06-18 RX ORDER — CYCLOBENZAPRINE HCL 10 MG
10 TABLET ORAL 3 TIMES DAILY PRN
Status: DISCONTINUED | OUTPATIENT
Start: 2021-06-18 | End: 2021-06-20 | Stop reason: HOSPADM

## 2021-06-18 RX ORDER — PREGABALIN 100 MG/1
100 CAPSULE ORAL 3 TIMES DAILY
Status: DISCONTINUED | OUTPATIENT
Start: 2021-06-18 | End: 2021-06-20 | Stop reason: HOSPADM

## 2021-06-18 RX ORDER — CETIRIZINE HYDROCHLORIDE 10 MG/1
10 TABLET ORAL EVERY MORNING
Status: DISCONTINUED | OUTPATIENT
Start: 2021-06-19 | End: 2021-06-20 | Stop reason: HOSPADM

## 2021-06-18 RX ORDER — IOPAMIDOL 755 MG/ML
125 INJECTION, SOLUTION INTRAVASCULAR ONCE
Status: COMPLETED | OUTPATIENT
Start: 2021-06-18 | End: 2021-06-18

## 2021-06-18 RX ORDER — ASPIRIN 81 MG/1
81 TABLET, CHEWABLE ORAL DAILY
Status: DISCONTINUED | OUTPATIENT
Start: 2021-06-19 | End: 2021-06-20 | Stop reason: HOSPADM

## 2021-06-18 RX ORDER — ASPIRIN 81 MG/1
81 TABLET, CHEWABLE ORAL DAILY
COMMUNITY
End: 2022-12-12

## 2021-06-18 RX ADMIN — SODIUM CHLORIDE 100 ML: 9 INJECTION, SOLUTION INTRAVENOUS at 17:10

## 2021-06-18 RX ADMIN — MIRABEGRON 50 MG: 50 TABLET, FILM COATED, EXTENDED RELEASE ORAL at 22:43

## 2021-06-18 RX ADMIN — VENLAFAXINE HYDROCHLORIDE 150 MG: 150 CAPSULE, EXTENDED RELEASE ORAL at 22:43

## 2021-06-18 RX ADMIN — IOPAMIDOL 70 ML: 755 INJECTION, SOLUTION INTRAVENOUS at 17:10

## 2021-06-18 RX ADMIN — DONEPEZIL HYDROCHLORIDE 10 MG: 10 TABLET ORAL at 22:44

## 2021-06-18 RX ADMIN — PROPAFENONE HYDROCHLORIDE 150 MG: 150 TABLET, FILM COATED ORAL at 22:44

## 2021-06-18 RX ADMIN — PREGABALIN 100 MG: 100 CAPSULE ORAL at 22:43

## 2021-06-18 ASSESSMENT — ENCOUNTER SYMPTOMS
FACIAL ASYMMETRY: 0
SPEECH DIFFICULTY: 1
NUMBNESS: 1

## 2021-06-18 ASSESSMENT — MIFFLIN-ST. JEOR: SCORE: 2256.83

## 2021-06-18 NOTE — ED TRIAGE NOTES
pt c/o numbness to left hand and left leg - onset 1550. on coumadin, hx of afib. hx of four strokes within the past month.

## 2021-06-18 NOTE — PROGRESS NOTES
ANTICOAGULATION MANAGEMENT     Patient Name:  Todd S Aschoff  Date:  2021    ASSESSMENT /SUBJECTIVE:    Today's INR result of 4.5 is supratherapeutic. Goal INR of 2.5-3.5      Warfarin dose taken: Warfarin taken as instructed    Diet: No new diet changes identified    Medication changes/ interactions: No new medications/supplements affecting INR    Previous INR: Therapeutic     S/S of bleeding or thromboembolism: No    New injury or illness: Patient reporting congestion and sore throat    Upcoming surgery, procedure or cardioversion: no    Additional findings: None      PLAN:    Warfarin Dosing Instructions: hold tonight then continue your current warfarin dose 5 mg on mon/wed/fri and 7.5 mg all other days    Instructed patient to follow up no later than:   Orders given to  Homecare nurse/facility to recheck    Education provided: Monitoring for bleeding signs and symptoms    Telephone call with home care nurse Anjelica whom verbalizes understanding and agrees to plan    Instructed to call the Anticoagulation Clinic for any changes, questions or concerns. (#[phone])        Britta Snowden RN      OBJECTIVE:  Recent labs: (last 7 days)     21   INR 4.5*         No question data found.  Anticoagulation Summary  As of 2021    INR goal:  2.5-3.5   TTR:  38.1 % (10.1 mo)   INR used for dosin.5 (2021)   Warfarin maintenance plan:  5 mg (5 mg x 1) every Sun, Wed, Fri; 7.5 mg (5 mg x 1.5) all other days   Full warfarin instructions:  : Hold; Otherwise 5 mg every Sun, Wed, Fri; 7.5 mg all other days   Weekly warfarin total:  45 mg   Plan last modified:  Linda Tang, RN (5/10/2021)   Next INR check:  2021   Priority:  Maintenance   Target end date:  Indefinite    Indications    Aortic valve prosthesis present [Z95.2]  Atrial fibrillation (H) [I48.91]  Long term current use of anticoagulant therapy [Z79.01]  Chronic atrial fibrillation (H) [I48.20]             Anticoagulation Episode  Summary     INR check location:      Preferred lab:  EXTERNAL LAB    Send INR reminders to:  AIDA MCWILLIAMS    Comments:  5mg tabs / CALENDAR / needs bridging. may leave DVM or speak with Paul, per signed consent // Home care      Anticoagulation Care Providers     Provider Role Specialty Phone number    Obdulio Ingram MD Referring Internal Medicine [phone]

## 2021-06-18 NOTE — H&P
[facility]    History and Physical  Hospitalist       Date of Admission:  6/18/2021    Assessment & Plan   65 year old male with past medical hx of several prior strokes and PAF and Mechanical AVR, who presents with left sided numbness:    Summary:    Principal Problem:    Left Asad-Paresthesias -- resolved after 3 hours, ?TIA vs focal seizure   -- Neurology consult (non-stroke for possible focal seizure)   -- Head MRI       Hx of CVA x 4, possibly embolic      AV Replacement, , on Warfarin with desired INR 2.5 to 3.5   -- hold Warfarin tonight, INR in AM       PAF -- on Warfarin    Active Problems:    Spells of decreased attentiveness    Possible Seizure disorder   -- continue present anticonvulsants       Plan:  Admit to Observation on neurology floor on telemetry.     DVT Prophylaxis: Warfarin  Code Status: Full Code    Disposition: Expected discharge tomorrow if no further symptoms.     Tin Warren [facility]  Pager: [phone]  Cell Phone:  [phone]     Primary Care Physician   Obdulio Ingram MD    Chief Complaint   Left sided numbnes    History is obtained from Patient    History of Present Illness   65 year old male [facility] with history of multiple CVAs, hyperlipidemia, and thoracic aortic dissection  (repaired and AVR in 1992) --  who presents with left sided numbness which started at 3:50 PM today. He has a history of four strokes in the past, his most recent being on 05/17/2021. He was at home today when he had similar stroke symptoms beginning at 1550 such as numbness throughout his tongue, mouth, and left-middle portion of his face. This then progressed to his left arms, left hand, fingers 1-4, and left leg. Upon transport, they noticed a left arm drift, great smile, and mildly slurred speech. He was well oriented upon transport.      In ER, HR 86 with /83, CBC and BMP normal, INR was 4.5 this AM, and repeat this  afternoon was 3.88.  Head CT shows:  1. No evidence of hemorrhage.  2. No CT evidence of acute ischemia.  3. Old infarcts involving the right occipital lobe and bilateral  cerebellar hemispheres.  4. A few patchy areas of white matter T2 hyperintensity which  presumably represent chronic small-vessel ischemic change. Subtle  infarcts cannot be excluded. MRI could be performed if indicated.    PAST MEDICAL HISTORY    Past Medical History:   Diagnosis Date     Alcohol dependence (H)      Allergy, unspecified not elsewhere classified     seasonal     Atrial fibrillation (H)      BPH (benign prostatic hypertrophy)      Chronic infection     low back wound incision not healing      Chronic pain     lower back and right leg and left leg     Dissection of aorta, thoracic (H) 1992    St Jose F aotic valve + arch graft 1992     Headache(784.0)      History of spinal cord injury      Low back pain      Major depression      Mixed hyperlipidemia      Numbness and tingling     right leg post surg rightt hip/ also left leg since surg     OA (osteoarthritis)     hips     Obesity, unspecified      Prostate infection      S/P [employer] 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 Jose F's valve     APPLY WOUND VAC Left 1/26/2021    Procedure: Placement of negative pressure wound therapy 2,400 squared centimeters  ;  Surgeon: Lazaro Plascencia MD;  Location: [facility] OR     C TOTAL HIP ARTHROPLASTY  2010    Left AMANDA     C TOTAL HIP ARTHROPLASTY  2002    R hip replacement comp's by nerve injury with pain down into R leg, nadya below knee     CATARACT IOL, RT/LT      rt eye only     CHOLECYSTECTOMY, LAPOROSCOPIC  8/10      CLOSE SECONDARY WOUND LOWER EXTREMITY Left 2/3/2021    Procedure:  Split Thickness Skin Graft to Leg of 18 square centimeters  Intermediate Closure of Leg of 8cm   ;  Surgeon: Lazaro Plascencia MD;  Location: [facility] OR     COLONOSCOPY N/A 1/15/2015    Procedure: COLONOSCOPY;  Surgeon: Johnson Kothari MD;  Location:  GI     DECOMPRESSION LUMBAR ONE LEVEL  4/3/2013    Procedure: DECOMPRESSION LUMBAR ONE LEVEL;  Open Decompression L3-4 bilateral;  Surgeon: Travis Coffey MD;  Location: [facility] OR     DECOMPRESSION, FUSION CERVICAL ANTERIOR ONE LEVEL, COMBINED  3/23/2012    Procedure:COMBINED DECOMPRESSION, FUSION CERVICAL ANTERIOR ONE LEVEL; Anterior Cervical Decompression and Fusion C4-6; Surgeon:TRAVIS COFFEY; Location:[facility] OR     EXPLORE SPINE, REMOVE HARDWARE, COMBINED  5/23/2013    Procedure: COMBINED EXPLORE SPINE, REMOVE HARDWARE;  Exploration Lumbar Wound for fluid collection;  Surgeon: Travis Coffey MD;  Location: [facility] OR     FUSION CERVICAL ANTERIOR TWO LEVELS  3/26/2012    Procedure:FUSION CERVICAL ANTERIOR TWO LEVELS; Anterior Cervical Fusion C4-6, Anterior Cervical  Hematoma Evacuation; Surgeon:TRAVIS COFFEY; Location:[facility] OR     IR LUMBAR EPIDURAL STEROID INJECTION  3/28/2003     IRRIGATION AND DEBRIDEMENT LOWER EXTREMITY, COMBINED Left 1/10/2021    Procedure: 1.  Irrigation and excisional debridement to muscle left distal medial calf chronic wounds total area measuring 9 cm x 4 cm 2.  Irrigation and excisional debridement to fascia left medial calf chronic hematoma measuring 29 x 6.7 x 4.2 cm 3.  Primary complex wound closure left medial distal calf 9 cm in length;  Surgeon: Rod Kemp MD;  Location:  OR     IRRIGATION AND DEBRIDEMENT LOWER EXTREMITY, COMBINED Left 1/26/2021    Procedure: Evacuation of hematoma debridement of skin, subcutaneous tissue and muscle 2,400 squared centimeters, placement of negative pressure wound therapy 2,400 squared centimeters;  Surgeon: Temo  MD Lazaro;  Location: [facility] OR     IRRIGATION AND DEBRIDEMENT SPINE, CLOSE WOUND, COMBINED  3/26/2012    Procedure:COMBINED IRRIGATION AND DEBRIDEMENT SPINE, CLOSE WOUND; Surgeon:TRAVIS COFFEY; Location:[facility] OR     removal of cyst of back   2.5week     TONSILLECTOMY       wisdom teeth[       ZZC NONSPECIFIC PROCEDURE  1992    repair of TAA with graft        Prior to Admission Medications   Prior to Admission Medications   Prescriptions Last Dose Informant Patient Reported? Taking?   acetaminophen (TYLENOL) 500 MG tablet 6/18/2021 at AM Care Giver Yes Yes   Sig: Take 500-1,000 mg by mouth 2 times daily   aspirin (ASA) 81 MG chewable tablet 6/18/2021 at AM Care Giver Yes Yes   Sig: Take 81 mg by mouth daily   cetirizine (ZYRTEC) 10 MG tablet 6/18/2021 at AM Care Giver Yes Yes   Sig: Take 10 mg by mouth every morning   cyclobenzaprine (FLEXERIL) 10 MG tablet 6/18/2021 at x2 Care Giver Yes Yes   Sig: Take 10 mg by mouth 3 times daily   diazepam (VALIUM) 5 MG tablet  at PRN Care Giver No Yes   Sig: TAKE ONE TABLET BY MOUTH EVERY EIGHT HOURS AS NEEDED FOR ANXIETY OR MUSCLE SPASM   donepezil (ARICEPT) 10 MG tablet 6/17/2021 at PM Care Giver Yes Yes   Sig: Take 10 mg by mouth At Bedtime   folic acid (FOLVITE) 1 MG tablet 6/18/2021 at AM Care Giver No Yes   Sig: Take 5 tablets (5 mg) by mouth daily   guanFACINE (TENEX) 1 MG tablet 6/17/2021 at PM Care Giver No Yes   Sig: Take 1 tablet (1 mg) by mouth At Bedtime   lamoTRIgine (LAMICTAL) 25 MG tablet 6/18/2021 at AM Care Giver Yes Yes   Sig: Take 25 mg by mouth daily   levothyroxine (SYNTHROID/LEVOTHROID) 150 MCG tablet 6/18/2021 at AM Care Giver No Yes   Sig: Take 1 tablet (150 mcg) by mouth daily   liothyronine (CYTOMEL) 25 MCG tablet 6/18/2021 at AM Care Giver No Yes   Sig: Take 1 tablet (25 mcg) by mouth daily   mirabegron (MYRBETRIQ) 50 MG 24 hr tablet 6/17/2021 at PM Care Giver Yes Yes   Sig: Take 50 mg by mouth every evening   pregabalin (LYRICA) 100 MG capsule  2021 at x2 Care Giver No Yes   Sig: Take 1 capsule (100 mg) by mouth 3 times daily Do not take if sleepy/sedated.   propafenone (RYTHMOL) 150 MG TABS tablet 2021 at x2 Care Giver No Yes   Sig: Take 1 tablet (150 mg) by mouth every 8 hours   venlafaxine (EFFEXOR-XR) 150 MG 24 hr capsule 2021 at PM Care Giver Yes Yes   Sig: Take 150 mg by mouth every evening   warfarin ANTICOAGULANT (COUMADIN) 2.5 MG tablet 2021 at PM Care Giver Yes Yes   Sig: Take 5 mg by mouth daily Sun, Wed, Fri   warfarin ANTICOAGULANT (COUMADIN) 2.5 MG tablet 2021 at PM Care Giver Yes Yes   Sig: Take 7.5 mg by mouth daily Mon, Tues, Thurs, Sat      Facility-Administered Medications: None     Allergies   Allergies   Allergen Reactions     Gabapentin      Severe behavioral disturbances       SOCIAL HISTORY    Social History     Social History Narrative    , 3 children, retired (has worked multiple jobs, 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  (last updated 2021)       Social History     Tobacco Use     Smoking status: Never Smoker     Smokeless tobacco: Never Used   Substance Use Topics     Alcohol use: No     Comment: Stopped drinking alcohol ~     Drug use: No        FAMILY HISTORY    Family History   Problem Relation Age of Onset     Cerebrovascular Disease Father          age 86, had M.I. ,diabetic also     Prostate Cancer Father      Cancer Mother          age 83 of dementia, also h/o lymphoma     Hypertension Brother         2 brothers with hypertension & sleep apnea     Hypertension Sister         Born ~1936     Sleep Apnea Brother          age 78, Alzheimers     No Known Problems Son      No Known Problems Daughter      No Known Problems Son      Family History Negative Brother         Had 5 brothers, three still alive as of 2019     Colon Cancer No family hx of         Review of Systems   The 10 point Review of Systems is negative other than noted in the HPI or here.       PHYSICAL EXAM     Temp: 97.8   F (36.6  C) Temp src: Oral BP: (!) 156/87 Pulse: 80   Resp: 16 SpO2: 93 % O2 Device: None (Room air)    Vital Signs with Ranges  Temp:  [97.8  F (36.6  C)] 97.8  F (36.6  C)  Pulse:  [72-94] 80  Resp:  [0-22] 16  BP: (141-176)/() 156/87  SpO2:  [93 %-99 %] 93 %  308 lbs 0 oz    Constitutional: Awake, alert, cooperative, no apparent distress.  Eyes: Conjunctiva and pupils examined and normal.  HEENT: Moist mucous membranes, normal dentition.  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  Cardiovascular: Regular rate and rhythm, normal S1 and S2, and no murmur noted, no carotid bruits.  No ankle edema.   GI: Obese, soft, non-distended, non-tender, normal bowel sounds.  Lymph/Hematologic: No anterior cervical, supraclavicular or axillary adenopathy.  Skin: No rashes, no cyanosis.   Musculoskeletal: No joint swelling, erythema or tenderness.  Neurologic: Alert, Ox3, Cranial nerves 2-12 intact, no focal weakness or numbness at present -- 3 hours after onset of symptoms he says they are resolved.   Psychiatric:  No obvious anxiety or depression.    Data   Data reviewed today:  I personally reviewed the EKG tracing showing NSR with rate 71.  Recent Labs   Lab 06/18/21  1646 06/18/21   WBC 6.5  --    HGB 13.9  --    MCV 90  --      --    INR 3.88* 4.5*     --    POTASSIUM 4.1  --    CHLORIDE 108  --    CO2 29  --    BUN 18  --    CR 1.02  --    ANIONGAP 3  --    CATERINA 9.0  --    GLC 90  --    TROPI <0.015  --        Imaging:  Recent Results (from the past 24 hour(s))   CT Head w/o Contrast    Narrative    CT SCAN OF THE HEAD WITHOUT CONTRAST   6/18/2021 5:08 PM     HISTORY: Code Stroke.    TECHNIQUE:  Axial images of the head and coronal reformations without  IV contrast material. Radiation dose for this scan was reduced using  automated exposure control, adjustment of the mA and/or kV according  to patient size,  or iterative reconstruction technique.    COMPARISON: Brain MRI 6/3/2021, head CT 6/3/2021    FINDINGS:  Mild volume loss is present. Multiple small patchy areas of  white matter hypoattenuation are present which presumably represent  mild chronic small-vessel ischemic change. Old infarcts are present  involving the right occipital lobe and bilateral cerebellum. No  evidence of hemorrhage. No convincing evidence of acute ischemia,  mass, mass effect or hydrocephalus. The visualized calvarium, tympanic  cavities, mastoid cavities, and paranasal sinuses are unremarkable.      Impression    IMPRESSION:   1. No evidence of hemorrhage.  2. No CT evidence of acute ischemia.  3. Old infarcts involving the right occipital lobe and bilateral  cerebellar hemispheres.  4. A few patchy areas of white matter T2 hyperintensity which  presumably represent chronic small-vessel ischemic change. Subtle  infarcts cannot be excluded. MRI could be performed if indicated.    BRADLEY ROMO MD

## 2021-06-18 NOTE — PROGRESS NOTES
left @ [facility] from SHYANN Dejesus @ [facility] reporting patient INR today was 4.5     Requests call back @ [phone]     Blayne Johansen RN

## 2021-06-18 NOTE — CONSULTS
[facility]    Stroke Telephone Note    I was called by Denise Sequeira on 06/18/21 regarding patient Todd S Aschoff. The patient is a 65 year old male with PMH of  aortic dissection s/p mechanical valve plus graft, atrial fibrillation anticoagulated with warfarin with current INR 3.8 seen recently for transient sensoty symptoms suspected to have been related to partial seizures seen as stroke alert for creeping left sided numbness. SBP 148mmHg and head CT, CTA head/neck were unremarkable for acute parenchymal or vascular pathology.     Stroke Code Data (for stroke code without tele)  Stroke code activated 06/18/21   1649   Stroke provider first response  06/18/21   1650    06/18/21   1650   Last known normal 06/18/21   1550        Time of discovery   (or onset of symptoms) 06/18/21   1550   Head CT read by Stroke Neuro Dr/Provider 06/18/21   1712   Was stroke code de-escalated? Yes 06/18/21 1720  presence of contraindications for both intravenous and intra-arterial stroke treatments       Thrombolytic Treatment   Not given due to DOAC dose within 48 hours or INR > 1.7.    Endovascular Treatment  Not initiated due to absence of proximal vessel occlusion    Impression  Ischemic stroke vs. Partial seizures    Recommendations   MRI brain w/o contrast did not show any areas of acute  Infarct. GRE findings unremarkable for amyloid angitis, continue with outpatient follow up in stroke clinic where a repeat MRI 6-months to a year down the line may be considered to evaluate for change in cerebral microbleed patter/burden. Can c/w PTA anticoagulation with target INR per primary team. No further inpatient stroke work-up would be indicated. Consider general neurology evaluation.       My recommendations are based on the information provided over the phone by Todd S Aschoff's in-person providers. They are not intended to replace the clinical judgment of his in-person providers. I was not requested to  "personally see or examine the patient at this time.    The Stroke Staff is Dr. Marcus.    MARLIN RAZA MD  Vascular Neurology Fellow  To page me or covering stroke neurology team member, click here: AMCOM   Choose \"On Call\" tab at top, then search dropdown box for \"Neurology Adult\", select location, press Enter, then look for stroke/neuro ICU/telestroke.         "

## 2021-06-18 NOTE — ED PROVIDER NOTES
"  History   Chief Complaint:  Stroke Symptoms    HPI   Todd S Aschoff is a 65 year old male on coumadin for atrial fibrillation with history of multiple CVAs, hyperlipidemia, and thoracic aortic dissection who presents with stroke symptoms. He reports that he has a history of four strokes in the past, his most recent being on 05/17/2021. He was at home today when he had similar stroke symptoms beginning at 1550 such as numbness throughout his tongue, mouth, and left-middle portion of his face. This then progressed to his left arms, left hand, fingers 1-4, and left leg. Upon transport, they noticed a left arm drift, and mildly slurred speech. He was well oriented upon transport.     Review of Systems   Neurological: Positive for speech difficulty and numbness. Negative for facial asymmetry.   All other systems reviewed and are negative.    10 systems reviewed and negative except as above and in HPI.     Allergies:  Gabapentin    Medications:  Aspirin 81 mg  Lipitor   Flexeril   Valium   Donepezil   Lasix   Tenex  Lamictal   Levothyroxine   Namenda   Mirabegron   Lyrica   Effexor   Coumadin     Past Medical History:    Alcohol Dependence   Atrial Fibrillation   BPH  Thoracic Dissection of Aorta  Heart Murmur   Major Depression   Hyperlipidemia   OA  Obesity   Sciatica  Hypothyroidism  CVA x4  Anemia     Past Surgical History:    Aortic Valve Replacement   Total Hip Arthroplasty, Bilaterally   Laparoscopic Cholecystectomy   Colonoscopy   Decompression Lumbar, L3-4  Decompression Fusion Cervical, C4-6  Tonsillectomy   Repair TAA    Family History:    Father - Cerebrovascular Disease, Prostate Cancer  Mother - Cancer, Dementia  Brother - Hypertension   Sister - Hypertension     Social History:  Arrives via EMS from home.     Physical Exam     Patient Vitals for the past 24 hrs:   BP Temp Temp src Pulse Resp SpO2 Height Weight   06/18/21 2015 (!) 156/87 -- Oral 80 16 93 % 1.88 m (6' 2\") 139.7 kg (308 lb)   06/18/21 1830 " (!) 161/100 -- -- 86 12 99 % -- --   06/18/21 1815 (!) 176/104 -- -- 94 17 98 % -- --   06/18/21 1800 (!) 147/90 -- -- 80 (!) 0 96 % -- --   06/18/21 1745 (!) 161/94 -- -- 86 (!) 0 96 % -- --   06/18/21 1730 (!) 152/83 -- -- 83 10 96 % -- --   06/18/21 1715 (!) 144/94 -- -- 82 22 93 % -- --   06/18/21 1713 -- 97.8  F (36.6  C) Temporal -- -- -- -- --   06/18/21 1700 (!) 148/97 -- -- 72 -- 95 % -- --   06/18/21 1647 (!) 141/99 -- -- 73 16 98 % -- 141.5 kg (312 lb)       Physical Exam  General: Resting on the gurney, appears uncomfortable  Head:  The scalp, face, and head appear normal  Mouth/Throat: Mucus membranes are moist  CV:  Regular rate    Normal S1 and S2  No pathological murmur   Resp:  Breath sounds clear and equal bilaterally    Non-labored, no retractions or accessory muscle use    No coarseness    No wheezing   GI:  Abdomen is soft, no rigidity    No tenderness to palpation  MS:  Normal motor assessment of all extremities.    Good capillary refill noted.      Skin:  No rash or lesions noted.  Neuro:   Speech is normal and fluent. Left sided paraesthesias to the face, including the forehead. Left sided paraesthesias of the arms and the legs. Slight left sided droop. No facial asymmetry.  Psych: Awake. Alert.  Normal affect.  Mild confusion.     Appropriate interactions.    Emergency Department Course   ECG  ECG taken at 1714, ECG read at 1714  Normal sinus rhythm  Left axis deviation   Possible Anterior infarct, age undetermined   Abnormal ECG   Rate 77 bpm. PA interval 168 ms. QRS duration 98 ms. QT/QTc 388/439 ms. P-R-T axes 63 -40 76.     Imaging:  CT Head w/o Contrast  1. No evidence of hemorrhage.  2. No CT evidence of acute ischemia.  3. Old infarcts involving the right occipital lobe and bilateral  cerebellar hemispheres.  4. A few patchy areas of white matter T2 hyperintensity which  presumably represent chronic small-vessel ischemic change. Subtle  infarcts cannot be excluded. MRI could be  performed if indicated.  Reading per radiology    CTA Head Neck with Contrast:  Pending     MR Brain w/o Contrast  Pending     Laboratory:  CBC: WBC 6.5, HGB 13.9,    BMP: AWNL (Creatinine: 1.02)    Troponin (Collected 1646): <0.015  PTT: 55 (H)  INR: 3.88 (H)    Asymptomatic COVID19 Virus PCR by nasopharyngeal swab pending    Emergency Department Course:    Reviewed:  I reviewed nursing notes, vitals, past medical history and care everywhere    Assessments:  1640 EMS arrives  1644 I called stroke code tier 1  1753 I rechecked the patient and explained findings.     Consults:   1650 I consulted with Dr. Dangelo, stroke neuro, regarding the patient's history and presentation here in the emergency department.    1722 I consulted with Dr. Dangelo, stroke neuro    1749 I consulted with Dr. Leo, on call Hospitalist, regarding the patient's history and presentation here in the emergency department.    Disposition:  The patient was admitted to the hospital under the care of Dr. Leo.     Impression & Plan     Medical Decision Making:  Todd S Aschoff is a 65 year old male who presents with weakness that started at 1550 today. Patient has a history of four previous avitia, his most recent in May 2021. Patient's physical exam showed normal exam with the exception of left sied paresthesias without facial asymmetry. He also reports of paraesthesias in his extremities and face. CT scan shows no acute infarct or obvious abnormality. I suspect TIA versus seizures. Patient will require an MRI, inpatient. Patient will be admitted under the care of hospitalist Dr. Leo.     Covid-19  Todd S Aschoff was evaluated during a global COVID-19 pandemic, which necessitated consideration that the patient might be at risk for infection with the SARS-CoV-2 virus that causes COVID-19.   Applicable protocols for evaluation were followed during the patient's care. COVID-19 was considered as part of the patient's evaluation. The  plan for testing is: a test was obtained during this visit.    Diagnosis:    ICD-10-CM    1. Spells of decreased attentiveness  R68.89 Asymptomatic SARS-CoV-2 COVID-19 Virus (Coronavirus) by PCR   2. Paresthesias  R20.2      Scribe Disclosure:  I, Tomás Harrison, am serving as a scribe at 4:45 PM on 6/18/2021 to document services personally performed by Denise Sequeira MD based on my observations and the provider's statements to me.              Denise Sequeira MD  06/18/21 [id number]

## 2021-06-18 NOTE — ED NOTES
"[facility]  ED Nurse Handoff Report    ED Chief complaint: Stroke Symptoms (pt c/o numbness to left hand and left leg - onset 1550. on coumadin, hx of afib. hx of four strokes within the past month. )      ED Diagnosis:   Final diagnoses:   Spells of decreased attentiveness   Paresthesias       Code Status: Full Code    Allergies:   Allergies   Allergen Reactions     Gabapentin      Severe behavioral disturbances       Patient Story: pt with hx of strokes, afib, anticoagulated on afib presents to ed with numbness of fingers on left hand, headache and left leg numbness since 1550.   Focused Assessment:  Alert and oriented x3 - facial symmetry noted, clear speech, speaking in complete sentences with occasional stumbling of words. Pt has equal strength to all extremities. +headache since 1550 - but states has been having \"a sinus headache for the past couple days\". Only neuro deficits are numbness to three fingers to left hand, lips, and left leg numbness. Left leg numbness has resolved while in ED - but numbness to lips and fingers on left hand persists.     Treatments and/or interventions provided: labs, CT results as follows  Patient's response to treatments and/or interventions: fair    To be done/followed up on inpatient unit:  MRI     Does this patient have any cognitive concerns?: pt reports some short term memory loss from recent strokes    Activity level - Baseline/Home:  Independent  Activity Level - Current:   Independent    Patient's Preferred language: English   Needed?: No    Isolation: MRSA  Infection: Not Applicable  MRSA  Patient tested for COVID 19 prior to admission: YES  Bariatric?: No    Vital Signs:   Vitals:    06/18/21 1700 06/18/21 1713 06/18/21 1715 06/18/21 1730   BP: (!) 148/97  (!) 144/94 (!) 152/83   Pulse: 72  82 83   Resp:   22 10   Temp:  97.8  F (36.6  C)     TempSrc:  Temporal     SpO2: 95%  93% 96%   Weight:           Cardiac Rhythm:     Was the PSS-3 " completed:   Yes  What interventions are required if any?               Family Comments: n/a  OBS brochure/video discussed/provided to patient/family: yes              Name of person given brochure if not patient: n/a              Relationship to patient: n/a    For the majority of the shift this patient's behavior was Green.   Behavioral interventions performed were reassurance and information.    ED NURSE PHONE NUMBER: [phone]

## 2021-06-18 NOTE — PROGRESS NOTES
Anticoagulation Management    Unable to reach Anjelica ALVAREZ RN  today.    Today's INR result of 4.5 is supratherapeutic (goal INR of 2.5-3.5).  Result received from: Home Care    Follow up required to confirm warfarin dose taken and assess for changes    No instructions provided. Unable to leave voicemail.      Anticoagulation clinic to follow up    Linda Tang RN

## 2021-06-19 ENCOUNTER — HOSPITAL ENCOUNTER (OUTPATIENT)
Dept: NEUROLOGY | Facility: CLINIC | Age: 66
End: 2021-06-19
Attending: PSYCHIATRY & NEUROLOGY
Payer: COMMERCIAL

## 2021-06-19 ENCOUNTER — APPOINTMENT (OUTPATIENT)
Dept: PHYSICAL THERAPY | Facility: CLINIC | Age: 66
End: 2021-06-19
Attending: INTERNAL MEDICINE
Payer: COMMERCIAL

## 2021-06-19 ENCOUNTER — APPOINTMENT (OUTPATIENT)
Dept: MRI IMAGING | Facility: CLINIC | Age: 66
End: 2021-06-19
Attending: INTERNAL MEDICINE
Payer: COMMERCIAL

## 2021-06-19 LAB — INR PPP: 3.84 (ref 0.86–1.14)

## 2021-06-19 PROCEDURE — 85610 PROTHROMBIN TIME: CPT | Performed by: INTERNAL MEDICINE

## 2021-06-19 PROCEDURE — 99225 PR SUBSEQUENT OBSERVATION CARE,LEVEL II: CPT | Performed by: INTERNAL MEDICINE

## 2021-06-19 PROCEDURE — 97530 THERAPEUTIC ACTIVITIES: CPT | Mod: GP | Performed by: PHYSICAL THERAPIST

## 2021-06-19 PROCEDURE — 97116 GAIT TRAINING THERAPY: CPT | Mod: GP | Performed by: PHYSICAL THERAPIST

## 2021-06-19 PROCEDURE — 97161 PT EVAL LOW COMPLEX 20 MIN: CPT | Mod: GP | Performed by: PHYSICAL THERAPIST

## 2021-06-19 PROCEDURE — 36415 COLL VENOUS BLD VENIPUNCTURE: CPT | Performed by: INTERNAL MEDICINE

## 2021-06-19 PROCEDURE — G0378 HOSPITAL OBSERVATION PER HR: HCPCS

## 2021-06-19 PROCEDURE — 70551 MRI BRAIN STEM W/O DYE: CPT

## 2021-06-19 PROCEDURE — 250N000013 HC RX MED GY IP 250 OP 250 PS 637: Performed by: PHYSICIAN ASSISTANT

## 2021-06-19 PROCEDURE — 95816 EEG AWAKE AND DROWSY: CPT

## 2021-06-19 PROCEDURE — 250N000013 HC RX MED GY IP 250 OP 250 PS 637: Performed by: INTERNAL MEDICINE

## 2021-06-19 RX ORDER — WARFARIN SODIUM 5 MG/1
5 TABLET ORAL
Status: COMPLETED | OUTPATIENT
Start: 2021-06-19 | End: 2021-06-19

## 2021-06-19 RX ORDER — AZELASTINE 1 MG/ML
1 SPRAY, METERED NASAL 2 TIMES DAILY
Status: DISCONTINUED | OUTPATIENT
Start: 2021-06-19 | End: 2021-06-20 | Stop reason: HOSPADM

## 2021-06-19 RX ADMIN — PREGABALIN 100 MG: 100 CAPSULE ORAL at 16:05

## 2021-06-19 RX ADMIN — ASPIRIN 81 MG CHEWABLE TABLET 81 MG: 81 TABLET CHEWABLE at 09:23

## 2021-06-19 RX ADMIN — WARFARIN SODIUM 5 MG: 5 TABLET ORAL at 19:00

## 2021-06-19 RX ADMIN — DONEPEZIL HYDROCHLORIDE 10 MG: 10 TABLET ORAL at 21:36

## 2021-06-19 RX ADMIN — AZELASTINE HYDROCHLORIDE 1 SPRAY: 137 SPRAY, METERED NASAL at 22:33

## 2021-06-19 RX ADMIN — MIRABEGRON 50 MG: 50 TABLET, FILM COATED, EXTENDED RELEASE ORAL at 19:00

## 2021-06-19 RX ADMIN — LEVOTHYROXINE SODIUM 150 MCG: 150 TABLET ORAL at 05:52

## 2021-06-19 RX ADMIN — PREGABALIN 100 MG: 100 CAPSULE ORAL at 21:36

## 2021-06-19 RX ADMIN — CETIRIZINE HYDROCHLORIDE 10 MG: 10 TABLET, FILM COATED ORAL at 09:23

## 2021-06-19 RX ADMIN — LAMOTRIGINE 25 MG: 25 TABLET ORAL at 09:23

## 2021-06-19 RX ADMIN — VENLAFAXINE HYDROCHLORIDE 150 MG: 150 CAPSULE, EXTENDED RELEASE ORAL at 21:36

## 2021-06-19 RX ADMIN — LIOTHYRONINE SODIUM 25 MCG: 25 TABLET ORAL at 05:52

## 2021-06-19 RX ADMIN — PROPAFENONE HYDROCHLORIDE 150 MG: 150 TABLET, FILM COATED ORAL at 21:36

## 2021-06-19 RX ADMIN — PROPAFENONE HYDROCHLORIDE 150 MG: 150 TABLET, FILM COATED ORAL at 05:52

## 2021-06-19 RX ADMIN — PREGABALIN 100 MG: 100 CAPSULE ORAL at 09:23

## 2021-06-19 RX ADMIN — PROPAFENONE HYDROCHLORIDE 150 MG: 150 TABLET, FILM COATED ORAL at 13:57

## 2021-06-19 NOTE — PHARMACY-ADMISSION MEDICATION HISTORY
Pharmacy Medication History  Admission medication history interview status for the 6/18/2021  admission is complete. See EPIC admission navigator for prior to admission medications     Location of Interview: Patient room  Medication history sources: Patient and RN from home care service    Significant changes made to the medication list:  Removed: atorvastatin, Astelin, Lasix, namenda, potassium, senna-docusate  Changed: Lamictal frequency, Flexeril frequency, Tylenol frequency    In the past week, patient estimated taking medication this percent of the time: greater than 90%    Additional medication history information:   Med list given by RN from home care service. Confirmed last doses with patient.    Medication reconciliation completed by provider prior to medication history? No    Time spent in this activity: 15 mins    Prior to Admission medications    Medication Sig Last Dose Taking? Auth Provider   acetaminophen (TYLENOL) 500 MG tablet Take 500-1,000 mg by mouth 2 times daily 6/18/2021 at AM Yes Unknown, Entered By History   aspirin (ASA) 81 MG chewable tablet Take 81 mg by mouth daily 6/18/2021 at AM Yes Unknown, Entered By History   cetirizine (ZYRTEC) 10 MG tablet Take 10 mg by mouth every morning 6/18/2021 at AM Yes Unknown, Entered By History   cyclobenzaprine (FLEXERIL) 10 MG tablet Take 10 mg by mouth 3 times daily 6/18/2021 at x2 Yes Unknown, Entered By History   diazepam (VALIUM) 5 MG tablet TAKE ONE TABLET BY MOUTH EVERY EIGHT HOURS AS NEEDED FOR ANXIETY OR MUSCLE SPASM  at PRN Yes Obdulio Ingram MD   donepezil (ARICEPT) 10 MG tablet Take 10 mg by mouth At Bedtime 6/17/2021 at PM Yes Unknown, Entered By History   folic acid (FOLVITE) 1 MG tablet Take 5 tablets (5 mg) by mouth daily 6/18/2021 at AM Yes Jody Tanner MD   guanFACINE (TENEX) 1 MG tablet Take 1 tablet (1 mg) by mouth At Bedtime 6/17/2021 at PM Yes Savi Ken CNP   lamoTRIgine (LAMICTAL) 25 MG tablet Take 25 mg by mouth  daily 6/18/2021 at AM Yes Unknown, Entered By History   levothyroxine (SYNTHROID/LEVOTHROID) 150 MCG tablet Take 1 tablet (150 mcg) by mouth daily 6/18/2021 at AM Yes Obdulio Ingram MD   liothyronine (CYTOMEL) 25 MCG tablet Take 1 tablet (25 mcg) by mouth daily 6/18/2021 at AM Yes Jody Tanner MD   mirabegron (MYRBETRIQ) 50 MG 24 hr tablet Take 50 mg by mouth every evening 6/17/2021 at PM Yes Unknown, Entered By History   pregabalin (LYRICA) 100 MG capsule Take 1 capsule (100 mg) by mouth 3 times daily Do not take if sleepy/sedated. 6/18/2021 at x2 Yes Savi Ken CNP   propafenone (RYTHMOL) 150 MG TABS tablet Take 1 tablet (150 mg) by mouth every 8 hours 6/18/2021 at x2 Yes Obdulio Ingram MD   venlafaxine (EFFEXOR-XR) 150 MG 24 hr capsule Take 150 mg by mouth every evening 6/17/2021 at PM Yes Unknown, Entered By History   warfarin ANTICOAGULANT (COUMADIN) 2.5 MG tablet Take 5 mg by mouth daily Sun, Wed, Fri 6/16/2021 at PM Yes Unknown, Entered By History   warfarin ANTICOAGULANT (COUMADIN) 2.5 MG tablet Take 7.5 mg by mouth daily Mon, Tues, Thurs, Sat 6/17/2021 at PM Yes Unknown, Entered By History       The information provided in this note is only as accurate as the sources available at the time of update(s)     Romina Santana PharmD

## 2021-06-19 NOTE — PROGRESS NOTES
Observation Goals:  ~diagnostic tests and consults completed and resulted: Not Met   ~vital signs normal or at patient baseline-Partially Met

## 2021-06-19 NOTE — PROGRESS NOTES
RECEIVING UNIT ED HANDOFF REVIEW    ED Nurse Handoff Report was reviewed by: Sammi Diaz RN on June 18, 2021 at 7:23 PM

## 2021-06-19 NOTE — PLAN OF CARE
Reason for Admission: L sided weakness and numbness    Cognitive/Mentation: A/Ox 4  Neuros/CMS: Intact ex lip numbness. Resolved L finger numbness. Duane's syndrome  VS: stable. Tele: SR with PVC  GI: BS active, + flatus, last BM 6/18/21. Continent.  : voiding. Continent.  Pulmonary: LS clear.  Pain: denies.     Drains: PIV  Skin: intact ex scattered bruising, L leg skin graft scar, midline back scar  Activity: IND  Diet: Reg with thin liquids. Takes pills whole.     Therapies recs: home  Discharge: pending neuro workup    End of shift summary: Patient stable through shift. EEG ordered.

## 2021-06-19 NOTE — CONSULTS
[facility]    Neurology Consultation     Date of Admission:  6/18/2021    Assessment & Plan   Todd S Aschoff is a 65 year old male who was admitted on 6/18/2021. I was asked to see the patient for possible focal seizure.      #TIA vs seizure  Risk for seizure- multiple embolic stroke, microhemorrhage    -MRI no acute pathology- foci of chronic microhemorrhage of indeterminate etiology. Most commonly, this is associated with chronic hypertension - Multifocal chronic infarcts in the bilateral cerebellar hemispheres, the right temporo-occipital junction and in the left parietal lobe.  -EEG Routine  -On Lamictal 25mg every day started in 2/2021- supposed to increase to 50mg every day per discharge note from Feb 2021  -Lamictal level  -recommend Stroke team discussion with patient on the risk and benefit of warfarin in the setting of possible amyloid deposition and cerebral  microhemorrhage ( no prior HTN)- Per stroke neurologist ( second hand information from Dr Tejada) there is no significant concern for bleeding given small number of microhemorrhage enough to hold warfarin    #Demenria  - On Aricept      Above was discussed with hospitalist    Angeline Dejesus MD  [facility] Neurology  Office Phone [phone]    History of Present Illness      From prior notes    65 year old male with PMH of HLD, aortic dissection s/p mechanical valve plus graft, atrial fibrillation anticoagulated with warfarin with current INR 3.8 and prior 4 times of embolic stroke presented with left sided numbness.    Stroke team evaluated the patient  partial seizures seen as stroke alert for creeping left sided numbness. SBP 148mmHg and   head CT, CTA head/neck were unremarkable for acute parenchymal or vascular     MRI Brain 6/19/21-no acute infarct, other pathology- multiple chronic infarcts in bilateral cbll, right temporo occipital and left parietal-   Multiple small microhemorrhage- associated with amyloid  angiopathy or  Chronic HTN    Recent MRI 6/3/21- no acute pathology - had acute neurologic deficit     Patient was recently admitted 5/17-5/19/21 for symptoms of numbness to upper and lower lips as well as tingling left thumb, index, and middle finger.  CVA work-up was performed at that time and brain MRI showed new tiny infarct in the right thalamus that was not present on 4/18/2021 (confirmed small acute infarcts scattered throughout the left greater than the right cerebral hemisphere). Prior stroke was 1/22-2/82021 (Rt frontal vertex infarct).     Patient presents once again with the same symptoms as above that started acutely on 6/3 around 10 AM. CT/CTA/MRI is initially negative for acute stroke.  He quickly improved back to baseline and no further acute neuro changes were noted. Neurology saw him and felt this was perhaps a recrudescence of recent right thalamic infarct.  Incidentally noted was that he was supposed to be on a twice daily dose of lamotrigine + have followed up with neurology for an EEG from his early 2021 stay.  While this episode not felt a seizure, it was felt important he remain on lamotrigine and follow-up      From the pt   + embolic stroke- 4 times in 2021  Son and daughter had seizure+  1980 s - Concussions from sports- without LOC  Fatigue and slept a lot for the past week    With respect to risk factors for epilepsy the patient denies any developmental delay, febrile seizure, TBI or meningitis/encephalitis, prior stroke, prior brain surgery, drug/alcohol history or family history of seizure     no seizure, no shaking, staring episode, tongue biting or incontinence,   Head trauma, recent illness, sleep deprivation, stress, alcohol/Drug recently.    Denied anypanic attacks or fear sensations, rising sensation, zoning out, auditory/visual/olfactory hallucinations  Held warfarin for surgery - early Jan 2021       Past Medical History    I have reviewed this patient's medical history and updated it with  pertinent information if needed.   Past Medical History:   Diagnosis Date     Alcohol dependence (H)      Allergy, unspecified not elsewhere classified     seasonal     Atrial fibrillation (H)      BPH (benign prostatic hypertrophy)      Chronic infection     low back wound incision not healing      Chronic pain     lower back and right leg and left leg     Dissection of aorta, thoracic (H) 1992    St Jose F aotic valve + arch graft 1992     Headache(784.0)      History of spinal cord injury      Low back pain      Major depression      Mixed hyperlipidemia      Numbness and tingling     right leg post surg rightt hip/ also left leg since surg     OA (osteoarthritis)     hips     Obesity, unspecified      Prostate infection      S/P 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  Past Surgical History:   Procedure Laterality Date     AORTIC VALVE REPLACEMENT  1992    [facility]. Jose F's valve     APPLY WOUND VAC Left 1/26/2021    Procedure: Placement of negative pressure wound therapy 2,400 squared centimeters  ;  Surgeon: Lazaro Plascencia MD;  Location: [facility] OR     C TOTAL HIP ARTHROPLASTY  2010    Left AMANDA     C TOTAL HIP ARTHROPLASTY  2002    R hip replacement comp's by nerve injury with pain down into R leg, nadya below knee     CATARACT IOL, RT/LT      rt eye only     CHOLECYSTECTOMY, LAPOROSCOPIC  8/10     CLOSE SECONDARY WOUND LOWER EXTREMITY Left 2/3/2021    Procedure:  Split Thickness Skin Graft to Leg of 18 square centimeters  Intermediate Closure of Leg of 8cm   ;  Surgeon: Lazaro Plascencia MD;  Location: [facility] OR     COLONOSCOPY N/A 1/15/2015    Procedure: COLONOSCOPY;  Surgeon:  Johnson Kothari MD;  Location: [facility] GI     DECOMPRESSION LUMBAR ONE LEVEL  4/3/2013    Procedure: DECOMPRESSION LUMBAR ONE LEVEL;  Open Decompression L3-4 bilateral;  Surgeon: Travis Coffey MD;  Location: [facility] OR     DECOMPRESSION, FUSION CERVICAL ANTERIOR ONE LEVEL, COMBINED  3/23/2012    Procedure:COMBINED DECOMPRESSION, FUSION CERVICAL ANTERIOR ONE LEVEL; Anterior Cervical Decompression and Fusion C4-6; Surgeon:TRAVIS COFFEY; Location:[facility] OR     EXPLORE SPINE, REMOVE HARDWARE, COMBINED  5/23/2013    Procedure: COMBINED EXPLORE SPINE, REMOVE HARDWARE;  Exploration Lumbar Wound for fluid collection;  Surgeon: Travis Coffey MD;  Location: [facility]     FUSION CERVICAL ANTERIOR TWO LEVELS  3/26/2012    Procedure:FUSION CERVICAL ANTERIOR TWO LEVELS; Anterior Cervical Fusion C4-6, Anterior Cervical  Hematoma Evacuation; Surgeon:TRAVIS COFFEY; Location:[facility] OR     IR LUMBAR EPIDURAL STEROID INJECTION  3/28/2003     IRRIGATION AND DEBRIDEMENT LOWER EXTREMITY, COMBINED Left 1/10/2021    Procedure: 1.  Irrigation and excisional debridement to muscle left distal medial calf chronic wounds total area measuring 9 cm x 4 cm 2.  Irrigation and excisional debridement to fascia left medial calf chronic hematoma measuring 29 x 6.7 x 4.2 cm 3.  Primary complex wound closure left medial distal calf 9 cm in length;  Surgeon: Rod Kemp MD;  Location: [facility]     IRRIGATION AND DEBRIDEMENT LOWER EXTREMITY, COMBINED Left 1/26/2021    Procedure: Evacuation of hematoma debridement of skin, subcutaneous tissue and muscle 2,400 squared centimeters, placement of negative pressure wound therapy 2,400 squared centimeters;  Surgeon: Lazaro Plascencia MD;  Location: [facility]     IRRIGATION AND DEBRIDEMENT SPINE, CLOSE WOUND, COMBINED  3/26/2012    Procedure:COMBINED IRRIGATION AND DEBRIDEMENT SPINE, CLOSE WOUND; Surgeon:TRAVIS COFFEY; Location:[facility] OR     removal of cyst of back   2.5week     TONSILLECTOMY       darryl  teeth[       ZZC NONSPECIFIC PROCEDURE  1992    repair of TAA with graft       Prior to Admission Medications   Prior to Admission Medications   Prescriptions Last Dose Informant Patient Reported? Taking?   acetaminophen (TYLENOL) 500 MG tablet 6/18/2021 at AM Care Giver Yes Yes   Sig: Take 500-1,000 mg by mouth 2 times daily   aspirin (ASA) 81 MG chewable tablet 6/18/2021 at AM Care Giver Yes Yes   Sig: Take 81 mg by mouth daily   cetirizine (ZYRTEC) 10 MG tablet 6/18/2021 at AM Care Giver Yes Yes   Sig: Take 10 mg by mouth every morning   cyclobenzaprine (FLEXERIL) 10 MG tablet 6/18/2021 at x2 Care Giver Yes Yes   Sig: Take 10 mg by mouth 3 times daily   diazepam (VALIUM) 5 MG tablet  at PRN Care Giver No Yes   Sig: TAKE ONE TABLET BY MOUTH EVERY EIGHT HOURS AS NEEDED FOR ANXIETY OR MUSCLE SPASM   donepezil (ARICEPT) 10 MG tablet 6/17/2021 at PM Care Giver Yes Yes   Sig: Take 10 mg by mouth At Bedtime   folic acid (FOLVITE) 1 MG tablet 6/18/2021 at AM Care Giver No Yes   Sig: Take 5 tablets (5 mg) by mouth daily   guanFACINE (TENEX) 1 MG tablet 6/17/2021 at PM Care Giver No Yes   Sig: Take 1 tablet (1 mg) by mouth At Bedtime   lamoTRIgine (LAMICTAL) 25 MG tablet 6/18/2021 at AM Care Giver Yes Yes   Sig: Take 25 mg by mouth daily   levothyroxine (SYNTHROID/LEVOTHROID) 150 MCG tablet 6/18/2021 at AM Care Giver No Yes   Sig: Take 1 tablet (150 mcg) by mouth daily   liothyronine (CYTOMEL) 25 MCG tablet 6/18/2021 at AM Care Giver No Yes   Sig: Take 1 tablet (25 mcg) by mouth daily   mirabegron (MYRBETRIQ) 50 MG 24 hr tablet 6/17/2021 at PM Care Giver Yes Yes   Sig: Take 50 mg by mouth every evening   pregabalin (LYRICA) 100 MG capsule 6/18/2021 at x2 Care Giver No Yes   Sig: Take 1 capsule (100 mg) by mouth 3 times daily Do not take if sleepy/sedated.   propafenone (RYTHMOL) 150 MG TABS tablet 6/18/2021 at x2 Care Giver No Yes   Sig: Take 1 tablet (150 mg) by mouth every 8 hours   venlafaxine (EFFEXOR-XR) 150 MG 24 hr  capsule 6/17/2021 at PM Care Giver Yes Yes   Sig: Take 150 mg by mouth every evening   warfarin ANTICOAGULANT (COUMADIN) 2.5 MG tablet 6/16/2021 at PM Care Giver Yes Yes   Sig: Take 5 mg by mouth daily Sun, Wed, Fri   warfarin ANTICOAGULANT (COUMADIN) 2.5 MG tablet 6/17/2021 at PM Care Giver Yes Yes   Sig: Take 7.5 mg by mouth daily Mon, Tues, Thurs, Sat      Facility-Administered Medications: None     Allergies   Allergies   Allergen Reactions     Gabapentin      Severe behavioral disturbances       Social History   I have reviewed this patient's social history and updated it with pertinent information if needed. Todd S Aschoff  reports that he has never smoked. He has never used smokeless tobacco. He reports that he does not drink alcohol or use drugs.    Family History   Family history reviewed with patient and is noncontributory.    Review of Systems   The 10 point Review of Systems is negative other than noted in the HPI or here.     Patient denies any LOC, HA, vision change (double vision/blurry vision/ vision loss), dizziness, imbalance, nausea, vomiting, dysphagia, dysarthria, weakness, numbness, tingling, incontinence, saddle anesthesia, prior seizure, stroke, trauma, brain surgery, weight change, recent illness, bleeding, bruising, fever, diarrhea, chest pain or shortness breath    Physical Exam   Temp: 97.5  F (36.4  C) Temp src: Oral BP: (!) 152/104 Pulse: 74   Resp: 16 SpO2: 98 % O2 Device: None (Room air)    Vital Signs with Ranges  Temp:  [97.5  F (36.4  C)-98  F (36.7  C)] 97.5  F (36.4  C)  Pulse:  [72-94] 74  Resp:  [0-22] 16  BP: (127-176)/() 152/104  SpO2:  [93 %-99 %] 98 %  308 lbs 0 oz      Limited due to telemedicine    General appearance:No acute distress   Neuro/Psych:Awake, alert, oriented *3   June 20 2021 - knows [facility]FLAQUITA Lynch, president   Cranial nerves: Grossly II-XII intact   Fundi/Optic disc: Not available  Extraocular movements intact. No nystagmus, Face appears symmetric. Facial  sensation intact(cannot assess). Hearing intact to finger rub (cannot assess).   Motor strength: Upper /Lower extremities at least 4/5 bilaterally,     Range of motion: Partially limited limited      Sensory: symmetric response to LT stimuli in both upper and lower extremities   Reflexes: Not available  Babinski/Clonus/Escobar: Not available  Coordination: FTN /FRANSISCA intact-   Romberg  n/a  Gait: n/a     This is a telemedicine visit that was performed with the originating site at [facility] and the distant site at provider s home in [place], [place]. Verbal consent was given to participate in video visit using [email or web address] real-time telemedicine video conference.  This visit occurred during the Coronavirus (COVID-19) Public Health Emergency and was requested by patient due to contact concerns.     I discussed with the patient the nature of our telemedicine visits, that:      I would evaluate the patient and recommend diagnostics and treatments based on my assessment and the exam is limited due to the nature of telemedicine visit.    Our sessions are not being recorded and that personal health information is protected    Our team would provide followup care in person if/when the patient needs it.     Patient identification was verified before the start of the encounter.      total time : 60 minutes  More than 50% of the time was spent on counseling on the Pathophysiology and differential diagnosis of the seizure,the importance of EEG and other Risk factor management as well as discussing/coordinating care with hospital staffs.

## 2021-06-19 NOTE — PROGRESS NOTES
06/19/21 0900   Quick Adds   Quick Adds Certification   Type of Visit Initial PT Evaluation       Present no   Living Environment   People in home child(morgan), adult;grandchild(morgan)   Current Living Arrangements house   Home Accessibility stairs to enter home;stairs within home   Number of Stairs, Main Entrance 7   Stair Railings, Main Entrance railing on right side (ascending)   Number of Stairs, Within Home, Primary other (see comments)  (14)   Stair Railings, Within Home, Primary railing on right side (ascending)   Transportation Anticipated family or friend will provide   Living Environment Comments Pt lives in a house with four adult children and grandchildren. Pt's son-in-law performs medication management for pt. Pt lives in a split level home and will have to negotiate stairs to enter and within. Pt states family member will pick him up upon discharge and will have 24/7 assistance.   Self-Care   Usual Activity Tolerance good   Current Activity Tolerance moderate   Regular Exercise Yes   Activity/Exercise Type other (see comments)  (PT exercises)   Equipment Currently Used at Home none   Activity/Exercise/Self-Care Comment Pt reports being IND at baseline with all ADLs including cooking, cleaning, bathing, and dressing. Pt does not use an AD for household or community ambulation. Pt reports stairs are not difficult for him. Pt states he could ambulate unlimited distances without an AD or rest break. Pt does not drive. Pt's family members perform errands for pt.   Disability/Function   Hearing Difficulty or Deaf yes   Patient's preferred means of communication English speaker with hearing loss, no speech problems.   Describe hearing loss bilateral hearing loss   Wear Glasses or Blind yes   Vision Management glasses   Concentrating, Remembering or Making Decisions Difficulty no   Difficulty Communicating no   Walking or Climbing Stairs Difficulty no   Doing Errands Independently  "Difficulty (such as shopping) no   Fall history within last six months no   Change in Functional Status Since Onset of Current Illness/Injury no   General Information   Onset of Illness/Injury or Date of Surgery 06/19/21   Referring Physician Tin Leo MD   Patient/Family Therapy Goals Statement (PT) \"To go home\"   Pertinent History of Current Problem (include personal factors and/or comorbidities that impact the POC) Pt is a 65 y.o. male admitted for L hemiparasthesias   Existing Precautions/Restrictions fall   Weight-Bearing Status - LLE full weight-bearing   Weight-Bearing Status - RLE full weight-bearing   Cognition   Orientation Status (Cognition) oriented x 3   Pain Assessment   Patient Currently in Pain No   Integumentary/Edema   Integumentary/Edema no deficits were identifed   Posture    Posture Protracted shoulders;Forward head position   Range of Motion (ROM)   ROM Quick Adds ROM WFL   Strength   Manual Muscle Testing Quick Adds Able to perform R SLR;Able to perform L SLR   Bed Mobility   Comment (Bed Mobility) Performed supine>sit w/ IND   Transfers   Transfer Safety Comments Performed sit>stand w/ SBA   Gait/Stairs (Locomotion)   [place] Level (Gait) contact guard   Assistive Device (Gait) other (see comments)  (none)   Distance in Feet (Required for LE Total Joints) 300'  (10' eval)   Comment (Gait/Stairs) Pt ambulated ~300' w/o AD and CGA. Pt ambulated w/ decreased gait speed, narrow HERMES, trendelenberg gait leaning to the L, downward gaze, and adequate step length. Verbal cues given for upright gaze and posture, and to widen HERMES. Pt negotiated 15 stairs consecutively using R rail and CGA. Pt ascend/descended with a step-to pattern and had no LOB. Verbal cues given for technique and to perform in a step-to pattern. Pt had no LOB throughout ambulation and demonstrated a safe and effective technique.   Balance   Balance Comments Pt able to sit at EOB unsupported without LOB. Pt " ambulated without AD and demonstrates safe and effective technique   Sensory Examination   Sensory Perception Comments Pt reports numbness and tingling on the tips of digits 2, 3, and 4 in L hand   Clinical Impression   Criteria for Skilled Therapeutic Intervention yes, treatment indicated   PT Diagnosis (PT) Impaired gait   Influenced by the following impairments Decreased activity tolerance; decreased strength; decreased balance   Functional limitations due to impairments Impaired functional mobility   Clinical Presentation Stable/Uncomplicated   Clinical Presentation Rationale Clinical Judgement   Clinical Decision Making (Complexity) low complexity   Therapy Frequency (PT) One time eval and treatment only   Predicted Duration of Therapy Intervention (days/wks) 4 days   Planned Therapy Interventions (PT) balance training;bed mobility training;gait training;patient/family education;strengthening;stair training;transfer training   Risk & Benefits of therapy have been explained evaluation/treatment results reviewed;care plan/treatment goals reviewed;risks/benefits reviewed;current/potential barriers reviewed;participants voiced agreement with care plan;participants included;patient   PT Discharge Planning    PT Discharge Recommendation (DC Rec) home with assist;home   PT Rationale for DC Rec Pt is near baseline for mobility. Pt is currently ambulating without an AD and demonstrates safe and effective strategies. Pt has 24/7 assist at home and anticipate at time of discharge pt will be SBA for bed mobility, functional transfers, gait and stairs.   PT Brief overview of current status  Supine>sit w/ IND, sit>stand w/ SBA, gait w/o AD and CGA, and stairs w/ CGA   Therapy Certification   Start of care date 06/19/21   Certification date from 06/19/21   Certification date to 06/23/21   Medical Diagnosis L Hemiparasthesias   Total Evaluation Time   Total Evaluation Time (Minutes) 10

## 2021-06-19 NOTE — PLAN OF CARE
Physical Therapy Discharge Summary    Reason for therapy discharge:    All goals and outcomes met, no further needs identified.    Progress towards therapy goal(s). See goals on Care Plan in [facility] electronic health record for goal details.  Goals met    Therapy recommendation(s):    No further therapy is recommended.

## 2021-06-19 NOTE — PROVIDER NOTIFICATION
"Paged Dr. Tejada : \"General neurology would like for stroke neurology team to be consulted for the patient for warfarin dosing.\"  "

## 2021-06-19 NOTE — PROGRESS NOTES
[facility]    Medicine Progress Note - Hospitalist Service        Date of Admission:  6/18/2021  4:43 PM    Assessment & Plan:   65 year old male with past medical hx of several prior strokes and PAF and Mechanical AVR, who presents with left sided numbness      TIA versus focal seizure  History of multiple CVAs  History of seizures  -Presented with left-sided numbness  -MRI of the brain with no acute infarct, intracranial mass or evidence of recent intracranial hemorrhage.  He was found to have multifocal chronic infarcts in the bilateral cerebellar hemisphere, right temporal-occipital junction in the left parietal lobe.  Also mild number of scattered foci of hemosiderin blooming in the cerebral hemisphere consistent with foci of chronic microhemorrhage of indeterminate etiology.  Discussed with stroke neurology, they are not concerned about amyloid angiopathy.  They feel that current findings unremarkable for amyloid angiitis, recommend repeat MRI in 6 months to a year to evaluate for change in cerebral microbleed pattern/burden.  They recommended continuing with prior to admission warfarin with target INR.  -resolved after 3 hours, ?TIA vs focal seizure  -Initially stroke neurology consulted, MRI without evidence of new stroke, they recommended general neurology evaluation.  -Evaluated by general neurology, EEG ordered  -Continue Lamictal         History of mechanical AV Replacement, on Warfarin target INR 2.5 to 3.5  Paroxysmal atrial fibrillation  -INR 3.84 today, pharmacy to dose Coumadin, did not receive Coumadin last night.  -Continue prior to admission Rythmol    Hypothyroidism  Continue prior to admission Synthroid and Cytomel         Diet: Regular Diet Adult     DVT Prophylaxis: Warfarin   Zimmer Catheter: not present  Code Status: Full Code     Disposition Plan    Expected discharge: Later today or tomorrow pending final neurology recommendation  Entered: Allen Tejada MD 06/19/2021,  "2:11 PM        The patient's care was discussed with the Bedside Nurse, Patient and General and stroke neurology.    Allen Tejada MD  Hospitalist Service  [facility]    ______________________________________________________________________    Interval History   No acute events overnight.  Patient symptoms are much better, has tingling on just the fingertips.  No new neurological changes.  No headache.  No chest pain or shortness of breath.    Data reviewed today: I reviewed all medications, new labs and imaging results over the last 24 hours. I personally reviewed no images or EKG's today.    Physical Exam   Vital signs:  Temp: 97.5  F (36.4  C) Temp src: Oral BP: (!) 149/87 Pulse: 79   Resp: 16 SpO2: 97 % O2 Device: None (Room air)   Height: 188 cm (6' 2\") Weight: 139.7 kg (308 lb)  Estimated body mass index is 39.54 kg/m  as calculated from the following:    Height as of this encounter: 1.88 m (6' 2\").    Weight as of this encounter: 139.7 kg (308 lb).      Wt Readings from Last 2 Encounters:   06/18/21 139.7 kg (308 lb)   06/03/21 141.5 kg (311 lb 15.2 oz)       Gen: AAOX3, NAD, comfortable  HEENT: Supple neck, moist oral mucosa, no pallor  Resp: CTA B/L, normal WOB, no crackles, no wheezes  CVS: RRR, no murmur  Abd/GI: Soft, non-tender. BS- normoactive.  No G/R/R  Skin: Warm, dry no rashes  MSK: No joint deformities, no pedal edema  Neuro- CN- intact. No focal deficits.     Data   Recent Labs   Lab 06/19/21  0751 06/18/21  1646 06/18/21   WBC  --  6.5  --    HGB  --  13.9  --    MCV  --  90  --    PLT  --  263  --    INR 3.84* 3.88* 4.5*   NA  --  140  --    POTASSIUM  --  4.1  --    CHLORIDE  --  108  --    CO2  --  29  --    BUN  --  18  --    CR  --  1.02  --    ANIONGAP  --  3  --    CATERINA  --  9.0  --    GLC  --  90  --    TROPI  --  <0.015  --        Recent Results (from the past 24 hour(s))   CT Head w/o Contrast    Narrative    CT SCAN OF THE HEAD WITHOUT CONTRAST   6/18/2021 5:08 PM "     HISTORY: Code Stroke.    TECHNIQUE:  Axial images of the head and coronal reformations without  IV contrast material. Radiation dose for this scan was reduced using  automated exposure control, adjustment of the mA and/or kV according  to patient size, or iterative reconstruction technique.    COMPARISON: Brain MRI 6/3/2021, head CT 6/3/2021    FINDINGS:  Mild volume loss is present. Multiple small patchy areas of  white matter hypoattenuation are present which presumably represent  mild chronic small-vessel ischemic change. Old infarcts are present  involving the right occipital lobe and bilateral cerebellum. No  evidence of hemorrhage. No convincing evidence of acute ischemia,  mass, mass effect or hydrocephalus. The visualized calvarium, tympanic  cavities, mastoid cavities, and paranasal sinuses are unremarkable.      Impression    IMPRESSION:   1. No evidence of hemorrhage.  2. No CT evidence of acute ischemia.  3. Old infarcts involving the right occipital lobe and bilateral  cerebellar hemispheres.  4. A few patchy areas of white matter T2 hyperintensity which  presumably represent chronic small-vessel ischemic change. Subtle  infarcts cannot be excluded. MRI could be performed if indicated.    BRADLEY ROMO MD   MR Brain w/o Contrast    Narrative    EXAM: MR BRAIN WITHOUT CONTRAST  LOCATION: [facility]  DATE/TIME: 06/19/2021, 2:37 AM    INDICATION: Transient ischemic attack (TIA).  COMPARISON: CT/CTA 06/18/2021. MRI brain 06/03/2021.  TECHNIQUE: Routine multiplanar multisequence head MRI without intravenous contrast.    FINDINGS:  INTRACRANIAL CONTENTS: No acute or subacute infarct. No mass, acute hemorrhage, or extra-axial fluid collections. Patchy nonspecific T2/FLAIR hyperintensities within the cerebral white matter most consistent with mild to moderate chronic microvascular   ischemic change. Mild generalized cerebral atrophy. No hydrocephalus. Multiple small chronic infarcts in the  bilateral cerebellar hemispheres are again demonstrated. There is a small to moderate chronic infarct involving the right temporo-occipital   junction. There is a small chronic cortical infarct in the left parietal lobe.     SELLA: No abnormality accounting for technique.    OSSEOUS STRUCTURES/SOFT TISSUES: Normal marrow signal. The major intracranial vascular flow-voids are maintained.     ORBITS: No abnormality accounting for technique.     SINUSES/MASTOIDS: No paranasal sinus mucosal disease. No middle ear or mastoid effusion.       Impression    IMPRESSION:  1.  No acute infarct, intracranial mass or evidence of recent intracranial hemorrhage.  2.  Multifocal chronic infarcts in the bilateral cerebellar hemispheres, the right temporo-occipital junction and in the left parietal lobe.  3.  Underlying mild to moderate presumed chronic small vessel ischemic changes and mild volume loss.  4.  There are a mild number of scattered foci of hemosiderin blooming in the cerebral hemispheres on the GRE sequence consistent with foci of chronic microhemorrhage of indeterminate etiology. Most commonly, this is associated with chronic hypertension   or amyloid angiopathy.       Medications     Warfarin Therapy Reminder         aspirin  81 mg Oral Daily     cetirizine  10 mg Oral QAM     donepezil  10 mg Oral At Bedtime     lamoTRIgine  25 mg Oral Daily     levothyroxine  150 mcg Oral Daily     liothyronine  25 mcg Oral Daily     mirabegron  50 mg Oral QPM     pregabalin  100 mg Oral TID     propafenone  150 mg Oral Q8H     venlafaxine  150 mg Oral QPM     warfarin ANTICOAGULANT  5 mg Oral ONCE at 18:00

## 2021-06-19 NOTE — PLAN OF CARE
Reason for admission: Admitted on 06/18/2021 with left sided numbness.    Date/Time: June 18, 2021 11:10 PM   Cognitive/Mentation: A&Ox, forgetful  Neuros/CMS: Intact with exception to left  thumb, pointer and middle finger numbness (improving) and lips and tongue slightly numb (improving). Has Duane Syndrome.  VS: B/P: 156/87, T: 97.8, P: 80, R: 16     Cardiovascular/Telemetry: NSR  Respiratory:LS clear bilaterally.    GI: BS+,Flatus +,BM this shift.Continent  : Ambulates tot he bathroom or urinal at bedside. Incontinent at times.  Pain: Denies    Drains:PIV-SL  Skin: Scattered bruising. Old scars-midline chest, back ( one open area cover with Mepilex  skin graft left upper thigh, and lower left calf.   Activity: Independent  Diet:Tolerating regular diet.Takes pills whole with water.  Discharge: Pending pt progress.    History: Multiple CVAs (x4), hyperlipidemia, and thoracic aortic dissection (repaired and AVR in 1992).    End of shift summary: Remained stable throughout the night. MRI  complete.    Observation Goals:  ~diagnostic tests and consults completed and resulted: Partially  Met   ~vital signs normal or at patient baseline-Partially Met

## 2021-06-19 NOTE — PHARMACY-ANTICOAGULATION SERVICE
Clinical Pharmacy - Warfarin Dosing Consult     Pharmacy has been consulted to manage this patient s warfarin therapy.  Indication: Atrial Fibrillation;Other - specify in comments  Therapy Goal: INR 2.5-3.5  Warfarin PTA Regimen: 5 mg Sun, Wed, Frid, 7.5 mg ROW  Significant drug interactions: Aspirin - PTA meds    INR   Date Value Ref Range Status   06/19/2021 3.84 (H) 0.86 - 1.14 Final   06/18/2021 3.88 (H) 0.86 - 1.14 Final     Chromogenic Factor 10   Date Value Ref Range Status   06/03/2021 17 (L) 70 - 130 % Final     Comment:     Therapeutic Range:  A Chromogenic Factor 10 level of approximately 20-40%   inversely correlates with an INR of 2-3 for patients receiving Warfarin.   Chromogenic Factor 10 levels below 20% indicate an INR greater than 3 and   levels above 40% indicate an INR less than 2.         Recommend warfarin 5 mg today.  Pharmacy will monitor Todd S Aschoff daily and order warfarin doses to achieve specified goal.      Please contact pharmacy as soon as possible if the warfarin needs to be held for a procedure or if the warfarin goals change.

## 2021-06-19 NOTE — PLAN OF CARE
[facility]      OUTPATIENT PHYSICAL THERAPY EVALUATION  PLAN OF TREATMENT FOR OUTPATIENT REHABILITATION  (COMPLETE FOR INITIAL CLAIMS ONLY)  Patient's Last Name, First Name, M.I.  YOB: 1955  Aschoff,Todd S                        Provider's Name  [facility] Medical Record No.  [id number]                               Onset Date:  06/19/21   Start of Care Date:  06/19/21      Type:     _X_PT   ___OT   ___SLP Medical Diagnosis:  L Hemiparasthesias                        PT Diagnosis:  Impaired gait   Visits from SOC:  1   _________________________________________________________________________________  Plan of Treatment/Functional Goals    Planned Interventions: balance training, bed mobility training, gait training, patient/family education, strengthening, stair training, transfer training     Goals: See Physical Therapy Goals on Care Plan in [facility] electronic health record.    Therapy Frequency: One time eval and treatment only  Predicted Duration of Therapy Intervention: 4 days  _________________________________________________________________________________    I CERTIFY THE NEED FOR THESE SERVICES FURNISHED UNDER        THIS PLAN OF TREATMENT AND WHILE UNDER MY CARE     (Physician co-signature of this document indicates review and certification of the therapy plan).              Certification date from: 06/19/21, Certification date to: 06/23/21    Referring Physician: Tin Leo MD            Initial Assessment        See Physical Therapy evaluation dated 06/19/21 in Epic electronic health record.

## 2021-06-20 VITALS
RESPIRATION RATE: 18 BRPM | BODY MASS INDEX: 39.53 KG/M2 | TEMPERATURE: 98.3 F | OXYGEN SATURATION: 95 % | SYSTOLIC BLOOD PRESSURE: 155 MMHG | HEIGHT: 74 IN | DIASTOLIC BLOOD PRESSURE: 68 MMHG | HEART RATE: 63 BPM | WEIGHT: 308 LBS

## 2021-06-20 LAB — INR PPP: 2.5 (ref 0.86–1.14)

## 2021-06-20 PROCEDURE — 99217 PR OBSERVATION CARE DISCHARGE: CPT | Performed by: INTERNAL MEDICINE

## 2021-06-20 PROCEDURE — 36415 COLL VENOUS BLD VENIPUNCTURE: CPT | Performed by: INTERNAL MEDICINE

## 2021-06-20 PROCEDURE — 80175 DRUG SCREEN QUAN LAMOTRIGINE: CPT | Performed by: INTERNAL MEDICINE

## 2021-06-20 PROCEDURE — 85610 PROTHROMBIN TIME: CPT | Performed by: INTERNAL MEDICINE

## 2021-06-20 PROCEDURE — G0378 HOSPITAL OBSERVATION PER HR: HCPCS

## 2021-06-20 PROCEDURE — 250N000013 HC RX MED GY IP 250 OP 250 PS 637: Performed by: INTERNAL MEDICINE

## 2021-06-20 RX ORDER — WARFARIN SODIUM 7.5 MG/1
7.5 TABLET ORAL
Status: COMPLETED | OUTPATIENT
Start: 2021-06-20 | End: 2021-06-20

## 2021-06-20 RX ORDER — WARFARIN SODIUM 7.5 MG/1
7.5 TABLET ORAL ONCE
Qty: 1 TABLET | Refills: 0
Start: 2021-06-20 | End: 2021-06-20

## 2021-06-20 RX ADMIN — WARFARIN SODIUM 7.5 MG: 7.5 TABLET ORAL at 17:53

## 2021-06-20 RX ADMIN — PREGABALIN 100 MG: 100 CAPSULE ORAL at 09:29

## 2021-06-20 RX ADMIN — LIOTHYRONINE SODIUM 25 MCG: 25 TABLET ORAL at 05:08

## 2021-06-20 RX ADMIN — PROPAFENONE HYDROCHLORIDE 150 MG: 150 TABLET, FILM COATED ORAL at 05:08

## 2021-06-20 RX ADMIN — PREGABALIN 100 MG: 100 CAPSULE ORAL at 17:53

## 2021-06-20 RX ADMIN — ASPIRIN 81 MG CHEWABLE TABLET 81 MG: 81 TABLET CHEWABLE at 09:29

## 2021-06-20 RX ADMIN — CETIRIZINE HYDROCHLORIDE 10 MG: 10 TABLET, FILM COATED ORAL at 09:29

## 2021-06-20 RX ADMIN — PROPAFENONE HYDROCHLORIDE 150 MG: 150 TABLET, FILM COATED ORAL at 13:44

## 2021-06-20 RX ADMIN — LAMOTRIGINE 25 MG: 25 TABLET ORAL at 09:29

## 2021-06-20 RX ADMIN — LEVOTHYROXINE SODIUM 150 MCG: 150 TABLET ORAL at 05:08

## 2021-06-20 NOTE — DISCHARGE SUMMARY
Continue Lamictal, follow-up closely with primary neurologist for [facility]    Discharge Summary  Hospitalist    Date of Admission:  6/18/2021  Date of Discharge:  6/20/2021  Discharging Provider: Allen Tejada MD    Discharge Diagnoses      TIA versus focal seizure  History of multiple CVAs  History of seizures  History of mechanical AV Replacement, on Warfarin target INR 2.5 to 3.5  Paroxysmal atrial fibrillation  Hypothyroidism      Hospital Course:    65 year old male with past medical hx of several prior strokes and PAF and Mechanical AVR, who presents with left sided numbness        TIA versus focal seizure  History of multiple CVAs  History of seizures  -Presented with left-sided numbness  -MRI of the brain with no acute infarct, intracranial mass or evidence of recent intracranial hemorrhage.  He was found to have multifocal chronic infarcts in the bilateral cerebellar hemisphere, right temporal-occipital junction in the left parietal lobe. Also mild number of scattered foci of hemosiderin blooming in the cerebral hemisphere consistent with foci of chronic microhemorrhage of indeterminate etiology.  Discussed with stroke neurology, they are not concerned about amyloid angiopathy.  They feel that current findings unremarkable for amyloid angiitis, recommend repeat MRI in 6 months to a year to evaluate for change in cerebral microbleed pattern/burden.  They recommended continuing with prior to admission warfarin with target INR.  -resolved after 3 hours, ?TIA vs focal seizure  -Also seen by general neurology, EEG completed.  EEG was unremarkable.  Neurology recommended continuing Lamictal, follow-up closely with primary neurologist as outpatient for up titration of Lamictal dose.  Instruction provided for no driving and other similar activities for next 3 months        History of mechanical AV Replacement, on Warfarin target INR 2.5 to 3.5  Paroxysmal atrial fibrillation  -INR  3.88 at presentation, Coumadin was held the first night of admission.  INR now 2.5.  Continue Coumadin.  Recommend 7.5 mg tonight, then readjust based on INR tomorrow morning.  Recommend INR check tomorrow.  Likely will go back to his prior to admission regimen after that.  -Continue prior to admission Rythmol     Hypothyroidism  Continue prior to admission Synthroid and Cytomel        Allen Tejada MD    Significant Results and Procedures   See eblow    Pending Results     Unresulted Labs Ordered in the Past 30 Days of this Admission     Date and Time Order Name Status Description    6/20/2021 0001 Lamotrigine Level In process           Code Status   Full Code       Primary Care Physician   Obdulio Ingram MD    Physical Exam   Temp: 98.1  F (36.7  C) Temp src: Oral BP: (!) 154/82 Pulse: 82   Resp: 16 SpO2: 95 % O2 Device: None (Room air)      Constitutional: AAOX3, NAD  Respiratory: CTA B/L, Normal WOB  Cardiovascular: RRR, No murmur  GI: Soft, Non- tender, BS- normoactive  Neuro: CN- grossly intact, Moving all 4 extremities.     Discharge Disposition   Discharged to home  Condition at discharge: Stable    Consultations This Hospital Stay   PHYSICAL THERAPY ADULT IP CONSULT  NEUROLOGY IP CONSULT  PHARMACY TO DOSE WARFARIN    Time Spent on this Encounter   I, Allen Tejada MD, personally saw the patient today and spent less than or equal to 30 minutes discharging this patient.    Discharge Orders      Follow-up and recommended labs and tests    Follow up with primary care provider, Obdulio Ingram MD, within 7 days for hospital follow- up.  The following labs/tests are recommended: INR on 6/21     Activity    Your activity upon discharge: activity as tolerated     Diet    Follow this diet upon discharge: Orders Placed This Encounter      Regular Diet Adult       Discharge Medications   Current Discharge Medication List      START taking these medications    Details   !! warfarin ANTICOAGULANT (COUMADIN) 7.5  MG tablet Take 1 tablet (7.5 mg) by mouth once for 1 dose  Qty: 1 tablet, Refills: 0    Associated Diagnoses: Aortic valve prosthesis present       !! - Potential duplicate medications found. Please discuss with provider.      CONTINUE these medications which have NOT CHANGED    Details   acetaminophen (TYLENOL) 500 MG tablet Take 500-1,000 mg by mouth 2 times daily      aspirin (ASA) 81 MG chewable tablet Take 81 mg by mouth daily      cetirizine (ZYRTEC) 10 MG tablet Take 10 mg by mouth every morning      cyclobenzaprine (FLEXERIL) 10 MG tablet Take 10 mg by mouth 3 times daily      diazepam (VALIUM) 5 MG tablet TAKE ONE TABLET BY MOUTH EVERY EIGHT HOURS AS NEEDED FOR ANXIETY OR MUSCLE SPASM  Qty: 60 tablet, Refills: 0    Associated Diagnoses: Back muscle spasm; Controlled substance agreement signed      donepezil (ARICEPT) 10 MG tablet Take 10 mg by mouth At Bedtime      folic acid (FOLVITE) 1 MG tablet Take 5 tablets (5 mg) by mouth daily  Qty: 150 tablet, Refills: 3    Associated Diagnoses: Major depressive disorder, recurrent episode, in full remission (H)      guanFACINE (TENEX) 1 MG tablet Take 1 tablet (1 mg) by mouth At Bedtime  Qty: 90 tablet, Refills: 0    Associated Diagnoses: Major depressive disorder, recurrent episode, moderate (H)      lamoTRIgine (LAMICTAL) 25 MG tablet Take 25 mg by mouth daily      levothyroxine (SYNTHROID/LEVOTHROID) 150 MCG tablet Take 1 tablet (150 mcg) by mouth daily  Qty: 90 tablet, Refills: 3    Associated Diagnoses: Acquired hypothyroidism      liothyronine (CYTOMEL) 25 MCG tablet Take 1 tablet (25 mcg) by mouth daily  Qty: 30 tablet, Refills: 3    Associated Diagnoses: Major depressive disorder, recurrent episode, in full remission (H)      mirabegron (MYRBETRIQ) 50 MG 24 hr tablet Take 50 mg by mouth every evening      pregabalin (LYRICA) 100 MG capsule Take 1 capsule (100 mg) by mouth 3 times daily Do not take if sleepy/sedated.  Qty: 270 capsule, Refills: 0     Associated Diagnoses: Chronic bilateral low back pain without sciatica      propafenone (RYTHMOL) 150 MG TABS tablet Take 1 tablet (150 mg) by mouth every 8 hours  Qty: 270 tablet, Refills: 0    Associated Diagnoses: Chronic atrial fibrillation (H)      venlafaxine (EFFEXOR-XR) 150 MG 24 hr capsule Take 150 mg by mouth every evening      !! warfarin ANTICOAGULANT (COUMADIN) 2.5 MG tablet Take 5 mg by mouth daily Sun, Wed, Fri      !! warfarin ANTICOAGULANT (COUMADIN) 2.5 MG tablet Take 7.5 mg by mouth daily Mon, Tues, Thurs, Sat       !! - Potential duplicate medications found. Please discuss with provider.        Allergies   Allergies   Allergen Reactions     Gabapentin      Severe behavioral disturbances     Data   Most Recent 3 CBC's:  Recent Labs   Lab Test 06/18/21  1646 06/04/21  1107 06/03/21  1026   WBC 6.5 6.2 6.0   HGB 13.9 13.9 13.9   MCV 90 91 91    246 249      Most Recent 3 BMP's:  Recent Labs   Lab Test 06/18/21  1646 06/04/21  1107 06/03/21  1514 06/03/21  1026    141  --  142   POTASSIUM 4.1 4.3 4.3 4.2   CHLORIDE 108 106  --  109   CO2 29 33*  --  33*   BUN 18 16  --  20   CR 1.02 1.02  --  1.06   ANIONGAP 3 2*  --  <1*   CATERINA 9.0 8.8  --  8.5   GLC 90 100*  --  89     Most Recent 2 LFT's:  Recent Labs   Lab Test 04/21/21  0845 01/22/21  1105   AST 21 25   ALT 31 24   ALKPHOS 86 106   BILITOTAL 1.1 0.9     Most Recent INR's and Anticoagulation Dosing History:  Anticoagulation Dose History     Recent Dosing and Labs Latest Ref Rng & Units 6/4/2021 6/7/2021 6/11/2021 6/18/2021 6/18/2021 6/19/2021 6/20/2021    Warfarin 5 mg - - - - - - 5 mg -    INR 0.86 - 1.14 3.72(H) 3.1(A) 3.5(A) 4.5(A) 3.88(H) 3.84(H) 2.50(H)    INR 0.86 - 1.14 - - - - - - -    Chromogenic Factor 10 70 - 130 % - - - - - - -        Most Recent 3 Troponin's:  Recent Labs   Lab Test 06/18/21  1646 06/03/21  1514 06/03/21  1026   TROPI <0.015 <0.015 <0.015     Most Recent Cholesterol Panel:  Recent Labs   Lab Test  04/18/21  1759   CHOL 254*   *   HDL 43   TRIG 233*     Most Recent 6 Bacteria Isolates From Any Culture (See EPIC Reports for Culture Details):  Recent Labs   Lab Test 01/22/21  1201 01/22/21  1107 01/10/21  0832 01/09/21  1502 01/09/21  1304 02/09/18  1330   CULT No growth No growth Light growth  Mixed aerobic and anaerobic esther  *  No predominant anaerobes  Moderate growth  Escherichia coli  *  Moderate growth  Citrobacter koseri  *  Light growth  Enterococcus faecalis  * No growth No growth No beta hemolytic Streptococcus Group A isolated     Most Recent TSH, T4 and A1c Labs:  Recent Labs   Lab Test 01/23/21  0747 11/30/20  1841 10/29/19  1024   TSH  --  2.17 6.00*   T4  --   --  0.87   A1C 4.9  --   --        Results for orders placed or performed during the hospital encounter of 06/18/21   CT Head w/o Contrast    Narrative    CT SCAN OF THE HEAD WITHOUT CONTRAST   6/18/2021 5:08 PM     HISTORY: Code Stroke.    TECHNIQUE:  Axial images of the head and coronal reformations without  IV contrast material. Radiation dose for this scan was reduced using  automated exposure control, adjustment of the mA and/or kV according  to patient size, or iterative reconstruction technique.    COMPARISON: Brain MRI 6/3/2021, head CT 6/3/2021    FINDINGS:  Mild volume loss is present. Multiple small patchy areas of  white matter hypoattenuation are present which presumably represent  mild chronic small-vessel ischemic change. Old infarcts are present  involving the right occipital lobe and bilateral cerebellum. No  evidence of hemorrhage. No convincing evidence of acute ischemia,  mass, mass effect or hydrocephalus. The visualized calvarium, tympanic  cavities, mastoid cavities, and paranasal sinuses are unremarkable.      Impression    IMPRESSION:   1. No evidence of hemorrhage.  2. No CT evidence of acute ischemia.  3. Old infarcts involving the right occipital lobe and bilateral  cerebellar hemispheres.  4. A few  patchy areas of white matter T2 hyperintensity which  presumably represent chronic small-vessel ischemic change. Subtle  infarcts cannot be excluded. MRI could be performed if indicated.    BRADLEY ROMO MD   MR Brain w/o Contrast    Narrative    EXAM: MR BRAIN WITHOUT CONTRAST  LOCATION: [facility]  DATE/TIME: 06/19/2021, 2:37 AM    INDICATION: Transient ischemic attack (TIA).  COMPARISON: CT/CTA 06/18/2021. MRI brain 06/03/2021.  TECHNIQUE: Routine multiplanar multisequence head MRI without intravenous contrast.    FINDINGS:  INTRACRANIAL CONTENTS: No acute or subacute infarct. No mass, acute hemorrhage, or extra-axial fluid collections. Patchy nonspecific T2/FLAIR hyperintensities within the cerebral white matter most consistent with mild to moderate chronic microvascular   ischemic change. Mild generalized cerebral atrophy. No hydrocephalus. Multiple small chronic infarcts in the bilateral cerebellar hemispheres are again demonstrated. There is a small to moderate chronic infarct involving the right temporo-occipital   junction. There is a small chronic cortical infarct in the left parietal lobe.     SELLA: No abnormality accounting for technique.    OSSEOUS STRUCTURES/SOFT TISSUES: Normal marrow signal. The major intracranial vascular flow-voids are maintained.     ORBITS: No abnormality accounting for technique.     SINUSES/MASTOIDS: No paranasal sinus mucosal disease. No middle ear or mastoid effusion.       Impression    IMPRESSION:  1.  No acute infarct, intracranial mass or evidence of recent intracranial hemorrhage.  2.  Multifocal chronic infarcts in the bilateral cerebellar hemispheres, the right temporo-occipital junction and in the left parietal lobe.  3.  Underlying mild to moderate presumed chronic small vessel ischemic changes and mild volume loss.  4.  There are a mild number of scattered foci of hemosiderin blooming in the cerebral hemispheres on the GRE sequence consistent with  foci of chronic microhemorrhage of indeterminate etiology. Most commonly, this is associated with chronic hypertension   or amyloid angiopathy.       *Note: Due to a large number of results and/or encounters for the requested time period, some results have not been displayed. A complete set of results can be found in Results Review.

## 2021-06-20 NOTE — DISCHARGE INSTRUCTIONS
Understanding Seizures  What is a seizure?  A seizure is a sudden, brief change in the electrical activity in your brain. Seizures can be caused by injury to the brain, scar formation, tumors, stroke or infection. Often the cause is not known. With treatment, most people lead normal lives.   What is the treatment for seizures?  The main treatment for seizures are anti-epileptic drugs (AEDs). These drugs greatly reduce or prevent seizures in most people. Tips for taking your medicines:    Never stop taking your seizure medicine without talking to your doctor. Do not stop your drugs just because your seizures have stopped.    Do not skip a dose. This can change how well the drug works.    If you miss a dose, take it as soon as you remember. If it is within a few hours of your next dose, skip the dose you forgot, and take your next dose. After that, go back to the normal schedule.    If you miss more than one dose, talk to your doctor or nurse.    NEVER take double doses.    Get refills ahead of time before you run out.    Store your medicines in a dry place. Keep medicines in the bottle that they came in. Throw away all used bottles.    Keep a list of the medicines you take.    Don't take herbal supplements or antacids without talking to your doctor first. And ask your pharmacist about taking drugstore medicines.    If you need help remembering to take your medicines, use a pillbox.    Ask family, friends or coworkers to remind you when it is time to take a dose.  Your blood levels will need to be tested to be sure your getting the right dose. Your doctor will tell you when you should be tested.  If you have side effects or another seizure, your doctor may need to adjust the dose or change the medicines.   What are the side effects of AED medicines?  You may have:    Dizziness    Trouble thinking    Tiredness    Stomach upset    Weight gain or loss    Depression    Allergic reaction (such as rash or fever)  If this  is a new drug or your dose has changed, the side effects should go away. Always talk to your doctor about any side effects. Your doctor can suggest ways to manage them.  What can I do to take care of myself?  Your lifestyle  Keep to a schedule as much as you can. Get plenty of sleep. Try to manage your stress.   Most people with seizures can lead an active life. Plan ahead for activities that carry some risk. You should:    Always swim with a  or michele present.    Wear a helmet for bike riding, skiing and contact sports, or whenever there is a risk of falling or head injury.    If you downhill ski or hike, go with a friend. You may need someone to get help if you have a seizure in remote areas. You should also consider a safety strap and harness when riding a ski lift.    Ask your doctor about contact sports.    Avoid activities where having a seizure would put you or someone else in danger.  Safe activities include jogging, aerobics, cross-country skiing, dancing, hiking, bowling and tennis.  It is a good idea to wear a medical alert bracelet. Tell family members, friends and co-workers about your seizure disorder.   Your home  Make sure your home is set up to be safe.     Carpet the floors in your home with heavy pile and thick padding.    Pad sharp corners of tables and other furniture. Look for rounded corners when you shop.    Choose chairs with arms to prevent falls.    Keep your bathroom and bedroom doors unlocked. Do not block these doors. If possible, hang them so they open outward, so that if someone falls against the door, it can still be opened.    Take showers only. In a bath, you risk drowning during a seizure.  ? Check often to make sure the bathroom drain works.  ? If you often fall during seizures, you may want to use a shower or tub seat with a safety strap.  ? You may want to use a handheld shower nozzle while sitting in the tub or shower.  ? Set water temperature low so that you won't be  burned if you lose consciousness while hot water is running.    Use curling irons or clothing irons with automatic shut-off switches to avoid burns.    Avoid using electrical appliances like hair dryers or electric razors in the bathroom or near water.    Replace all glass doors with safety glass or plastic.    Fill your plate or bowl near the stove instead of carrying all the food to the table.    When cooking, turn the pot and pan handles toward the back of the stove. Use long, heavy-duty oven mitts or holders when reaching into a hot oven.    Put guards around the fireplace or close screens when fires are burning.    Don't smoke or light fires when you're by yourself.    Don't carry hot ashes or lighted candles through the house.    Make sure motor-driven equipment, like lawn mowers, have a handle that will stop the machine if your hand releases normal pressure.    Driving your car is generally safe and legal once the seizures are under control. [place] state law says that a person must not drive for 3 months if a seizure resulted in loss of consciousness.  Brain altering chemicals  Many chemicals can cause seizures. Even small amounts of alcohol can trigger seizures. Illegal drugs can also cause severe seizures, even in people who do not normally have them.   Safety for children  It's easy to worry if your child has epilepsy, but you can make your house and everyday activities safer with a few simple adjustments:    A monitor in your child's bedroom may alert you to the sound of a typical seizure.    Avoid top bunks. A lower bunk, a regular bed, a futon or even a mattress on the floor are all safer places to sleep for a child with seizures.    A well-fitting helmet with a face guard may protect against head and facial injuries from severe drop seizures.    Have your child wear a life vest when near water, including the backyard pool.    Closely supervise showers.    Put a list of first-aid steps on the  refrigerator or some other place that's easy to find.    When you have babysitters, go over first-aid steps. Write down the phone number where you or a relative can be reached, the doctor's number and the number for emergency services.    If your child is going to sleep at a friend or relative's house overnight, make sure to send a list of first-aid steps. Also make sure an adult in the house knows what to expect and what to do if a seizure happens.    Remember, not every childhood injury is preventable, whether or not a child has seizures. Try to find balance between safety and overprotection.  How should family and friends respond to a seizure?  There is no way to stop a seizure. Family and caregivers can try to make sure you are safe.   When a seizure starts, family or caregivers should:     Keep you from falling.    Loosen tight clothing, especially around the neck.    Cushion the head.    Clear the area of furniture and sharp objects.    Turn you on your side. If you vomit, this helps prevent inhaling vomit.    Stay with you until you recover or medical help arrives.    Take your pulse and watch your rate of breathing, if possible.  Seizures typically last less than 3 minutes. Afterward, the person may be tired, confused and achy. He or she may need to sleep for several hours to recover.  Call 911 if:    A seizure lasts more than 5 minutes.    This is the first seizure ever.    Another seizure starts before awareness returns after a prior seizure.    The person had a seizure in water.    The person is pregnant, injured or has diabetes.    The person does not have a medical ID bracelet instructing what to do.    The person does not wake up or behave normally.    This seizure is different than the usual seizure.  When someone has a seizure,  Do NOT    Place anything between the person's teeth (including your fingers).    Try to hold the person down.    Give the person something to eat or drink until he or she is  fully awake and alert.    Move the person unless they are in danger or near a hazard.    Start CPR unless the seizure has stopped and the person is not breathing or has no pulse.    Try to stop the person from convulsing. They have no control over the seizure.  When should I call my doctor?  Call if you have:    Seizures more often. Especially if they have been under control for a long time.    Weakness, problems with seeing, or new problems with balance.    Unusual behavior    Side effects from medicines that are not going away or are getting worse.  Coping at home  Seizures affect those around you, too. Talk with your loved ones and learn their concerns.   People with seizures can hold many kinds of jobs. But here are some issues to keep in mind.  Your work needs to be safe. How well controlled are your seizures? Does the job involve tasks that may not be safe for you, such as driving or using heavy machinery?  You may want to tell your boss or co-workers about your seizures. This is your choice. But you may be safer if people at your workplace are prepared to respond to a seizure. If you are concerned about losing your job, know your rights. The Americans with Disabilities Act provides work-related protections for people with epilepsy.   Know the signs of depression. Depression can affect your thoughts and feelings. It can be caused by trouble coping with seizures. Call your doctor if you are having problems with this.   Resources  Epilepsy Foundation:  www.epilepsyfoundationmn.org  Epilepsy.com:www.epilepsy.com  National [place] of Neurological Disorders and Stroke:www.ninds.nih.gov/disorders/epilepsy/epilepsy.htm  [employer]sHealth.org:www.[employer]health.org  The [place] Equal Employment Opportunity Commission:  www.eeoc.gov/facts/epilepsy.htm  Phone: 683.732.2062  For informational purposes only. Not to replace the advice of your health care provider.   Copyright   2011 [facility]. All rights reserved.  [employer] 158157 - REV 01/16.  For informational purposes only. Not to replace the advice of your health care provider.  Copyright   2018 [employer]. All rights reserved.      Your risk factors for stroke or TIA (transient ischemic attack):    Your Risk Factors Your Results Normal Ranges   High blood pressure BP Readings from Last 1 Encounters:   06/20/21 (!) 155/68    Less than 120/80   Cholesterol              Total Lab Results   Component Value Date    CHOL 254 04/18/2021      Less than 150    Triglycerides   Lab Results   Component Value Date    TRIG 233 04/18/2021    Less than 150   LDL Lab Results   Component Value Date     04/18/2021       Less than 70   HDL Lab Results   Component Value Date    HDL 43 04/18/2021            Greater than 40 (men)  Greater than 50 (women)   Diabetes Recent Labs   Lab 06/18/21  1646   GLC 90    Fasting blood glucose    Smoking/tobacco use  Quit smoking and tobacco   Overweight  Lose 1-2 pounds a week   Lack of exercise  30 minutes moderate activity each day   Other risk factors include carotid (neck) artery disease, atrial fibrillation and stress. You may be on new medicine to treat high blood pressure, cholesterol, diabetes or atrial fibrillation.    Understanding Stroke Booklet given to patient. Please refer to booklet for further information.    Stroke warning signs and symptoms - CALL 911 right away for:  - Sudden numbness or weakness in the face, arm or leg (often on one side of the body).  - Sudden confusion or trouble understanding what is going on.  - Sudden blurred or decreased vision in one or both eyes.  - Sudden trouble speaking, loss of balance, dizziness or problems with coordination.  - Sudden, severe headache for no reason.  - Fainting or seizures.  - Symptoms may go away then come back suddenly.

## 2021-06-20 NOTE — PLAN OF CARE
A&Ox 4. VSS ex HTN on RA. Tele: SR.  Denies pain. Neuros intact. Pt. Denies any numbness. CMS intact. Regular diet.  Voiding in urinal.  Up independently.  Disposition pending routine EEG today. Contact precautions maintained. Continue to monitor.

## 2021-06-20 NOTE — PROGRESS NOTES
[facility]    Neurology Progress Note     Assessment & Plan   Todd S Aschoff is a 65 year old male who was admitted on 6/18/2021. *    Todd S Aschoff is a 65 year old male who was admitted on 6/18/2021. I was asked to see the patient for possible focal seizure.        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 Most commonly, this is associated with chronic hypertension - Multifocal chronic infarcts in the bilateral cerebellar hemispheres, the right temporo-occipital junction and in the left parietal lobe.  -EEG Routine  -On Lamictal 25mg every day started in 2/2021- supposed to increase to 50mg every day per discharge note from Feb 2021  -Per patient - lamictal 25mg BID increased 1 mo ago - NOT 25mg every day- His son confirmed over the phone  -Lamictal level- pending   -recommend Stroke team discussion with patient on the risk and benefit of warfarin in the setting of possible amyloid deposition and cerebral  microhemorrhage ( no prior HTN)- Per stroke neurologist ( second hand information from Dr Tejada) there is no significant concern for bleeding given small number of microhemorrhage enough to hold warfarin  -Needs to be followed by his outpatient at [facility]  Neurologist in July 2021  - Neurology will sign off  -Seizure/syncope precaution was given: No driving for 3 months after LOC/or seizure like activity  , No height, No showering with doors locked, No swimming alone, No operating machinery    -Risk factor management for epilepsy was discussed- including avoiding sleep deprivation/stress/ missing AED/Drug or alcohol        #Demenria  - On Aricept     Above was discussed with hospital staffs     Angeline Dejesus MD  [facility] Neurology  Office Phone [phone]        Angeline Dejesus MD    Interval History    Had another episode of numbness around finger and mouth lasting  For   30 mins    No seizure or event over night  Physical Exam   Temp: 98.1  F (36.7  C) Temp src: Oral BP: (!) 154/82 Pulse: 82   Resp: 16 SpO2: 95 % O2 Device: None (Room air)    Vitals:    06/18/21 1647 06/18/21 2015   Weight: 141.5 kg (312 lb) 139.7 kg (308 lb)     Vital Signs with Ranges  Temp:  [98.1  F (36.7  C)-98.6  F (37  C)] 98.1  F (36.7  C)  Pulse:  [80-86] 82  Resp:  [16-18] 16  BP: (137-213)/() 154/82  SpO2:  [95 %-96 %] 95 %  I/O last 3 completed shifts:  In: 260 [P.O.:260]  Out: 1375 [Urine:1375]        Medications     Warfarin Therapy Reminder         aspirin  81 mg Oral Daily     azelastine  1 spray Both Nostrils BID     cetirizine  10 mg Oral QAM     donepezil  10 mg Oral At Bedtime     lamoTRIgine  25 mg Oral Daily     levothyroxine  150 mcg Oral Daily     liothyronine  25 mcg Oral Daily     mirabegron  50 mg Oral QPM     pregabalin  100 mg Oral TID     propafenone  150 mg Oral Q8H     venlafaxine  150 mg Oral QPM     warfarin ANTICOAGULANT  7.5 mg Oral ONCE at 18:00       This is a telemedicine visit that was performed with the originating site at [facility] and the distant site at provider s home in [place], [place]. Verbal consent was given to participate in video visit using [employer] real-time telemedicine video conference.  This visit occurred during the Coronavirus (COVID-19) Public Health Emergency and was requested by patient due to contact concerns.     I discussed with the patient the nature of our telemedicine visits, that:      I would evaluate the patient and recommend diagnostics and treatments based on my assessment and the exam is limited due to the nature of telemedicine visit.    Our sessions are not being recorded and that personal health information is protected    Our team would provide followup care in person if/when the patient needs it.     Patient identification was verified before the start of the encounter.    total time :35 minutes  More than 50% of the time was  spent on discussing/coordinating care with hospital staffs.

## 2021-06-20 NOTE — PLAN OF CARE
Pt presented with L sided numbness and facial numbness.  Left sided numbness resolved.  Facial numbness resolving, pt states he still has slight numbness on his tongue.  A/O x 4.  Otherwise neuros and cms intact.  On tele.  NSR.  Vitals stable.  LS clear.  Tolerating reg diet.  LBM today.  No voiding difficulties.  Denies pain.  Up independently.  Disposition pending routine EEG tomorrow.  Belongings at bedside.  Scoring green on aggression stoplight tool.

## 2021-06-20 NOTE — PLAN OF CARE
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 Tele: SR  GI: BS active, + flatus, last BM 6/18/21. Continent.  : voiding. Continent.  Pulmonary: LS clear.  Pain: denies.      Drains: PIV  Skin: intact ex scattered bruising, L leg skin graft scar, midline back scar  Activity: IND  Diet: Reg with thin liquids. Takes pills whole.      Therapies recs: home  Discharge: today     End of shift summary: Patient stable through shift. EEG completed.

## 2021-06-21 ENCOUNTER — ANTICOAGULATION THERAPY VISIT (OUTPATIENT)
Dept: INTERNAL MEDICINE | Facility: CLINIC | Age: 66
End: 2021-06-21

## 2021-06-21 ENCOUNTER — PATIENT OUTREACH (OUTPATIENT)
Dept: CARE COORDINATION | Facility: CLINIC | Age: 66
End: 2021-06-21

## 2021-06-21 ENCOUNTER — DOCUMENTATION ONLY (OUTPATIENT)
Dept: INTERNAL MEDICINE | Facility: CLINIC | Age: 66
End: 2021-06-21

## 2021-06-21 ENCOUNTER — TELEPHONE (OUTPATIENT)
Dept: INTERNAL MEDICINE | Facility: CLINIC | Age: 66
End: 2021-06-21

## 2021-06-21 DIAGNOSIS — Z71.89 OTHER SPECIFIED COUNSELING: ICD-10-CM

## 2021-06-21 DIAGNOSIS — Z95.2 AORTIC VALVE PROSTHESIS PRESENT: ICD-10-CM

## 2021-06-21 DIAGNOSIS — I48.0 PAF (PAROXYSMAL ATRIAL FIBRILLATION) (H): ICD-10-CM

## 2021-06-21 LAB — INR PPP: 2.1 (ref 0.9–1.1)

## 2021-06-21 NOTE — LETTER
Letter by Kem Matta NP at      Author: Kem Matta NP Service: -- Author Type: --    Filed:  Encounter Date: 2/11/2021 Status: (Other)         Patient: Todd S Aschoff   MR Number: [id number]   YOB: 1955   Date of Visit: 2/11/2021     [facility]      Facility:    [facility] [[id number]]  Code Status: FULL CODE      Chief Complaint/Reason for Visit:  402  Chief Complaint   Patient presents with   ? Review Of Multiple Medical Conditions     s/p skin graft       HPI:   Nicko is a 65 y.o. male who is a transfer from [facility] with an admission on 1/22/21 and discharge on 2/7/21. He has a PMH of A-fib on coumadin with MAV, hypothyroidism, depression, dementia, who was hospitalized 1/9-1/13/21 there for left leg wound with drainage requiring I&D (initially from a fall) per hematoma formation and then was discharged home. He was then re-admitted with generalized weakness, confusion and hypotension. He was also found to have an acute CVA w/o local deficits and had a seizure prior to admission (apparently right arm shaking with unresponsiveness). Per CT imaging found to have right occipital infarct and MRI showed an acute infarct in the right frontal vertex and multiple chronic infarcts in the right occipital lobe and bilateral cerebellar hemispheres. Further, MRA showed possible diffuse stenosis of the left PCA. TTE unchanged.  Neuro consulted, started on lamotrigine and will f/u with neuro outpatient. Per chart review during prior hospitalization initially found to have infected hematoma, underwent I&D on 1/10, wound grew E. Coli, given rocephin and discharged on cefuroxime per ID. He then had a dehiscence of wound with necrosis, underwent surgery with plastics on 1/26 and had 1.4L blood removal with VAC placement with changes on M/W/Fr. He then had skin graft placement on 2/3/21 with ongoing VAC usage. Of note he was supra  therapeutic, required vit K prior to surgery as well. He also developed CHARISSA with peak Cr of 1.66, which normalized. He was then discharged to the TCU for rehab.    Past Medical History:  Past Medical History:   Diagnosis Date   ? Advanced directives, counseling/discussion 1/6/2012    Discussed Advance Directive planning with patient; information given to patient to review.   ? Alcohol dependence in remission (H) 8/2/2018   ? Anemia, unspecified type 1/22/2021   ? Aortic valve prosthesis present 2/8/2021   ? Benign prostatic hyperplasia 2/8/2021   ? Bilateral thoracic back pain 11/16/2016   ? Chronic atrial fibrillation (H) 8/7/2002   ? Chronic low back pain 8/19/2011   ? Depressive disorder 6/9/2010    Depression  NOS   ? Elevated prostate specific antigen (PSA) 1/22/2020   ? Family history of prostate cancer 1/22/2020   ? Generalized muscle weakness 1/22/2021   ? 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 Hyperlipidemia LDL goal <130 10/31/2010   ? Hypotension, unspecified hypotension type 1/22/2021   ? Hypothyroidism 8/7/2002    Hypothyroidism On Replacement Problem list name updated by automated process. Provider to review   ? Leg wound, left 1/9/2021   ? Long term current use of anticoagulant therapy 8/7/2002    LW Modifier:  Coumadin, since mechanical aortic valve LW Onset:  1992 ; Anticoagulant Rx Long Term Problem list name updated by automated process. Provider to review   ? Lumbar radiculopathy 4/3/2013   ? Lumbar surgical wound fluid collection 5/23/2013   ? Memory difficulties 1/16/2015   ? Morbid obesity (H) 4/20/2010    LW Modifier:  BMI 41.3 (Apr 2010) ; Obesity Morbid   ? OAB (overactive bladder) 5/11/2015   ? Persons encountering health services in other specified circumstances 4/3/2012    Formatting of this note might be different from the original. EMERGENCY CARE PLAN Presenting Problem Signs and Symptoms Treatment Plan   Questions or conerns during clinic hours    I will call the  clinic directly    Questions or conerns outside clinic hours    I will call the 24 hour nurse line at [phone]   Patient needs to schedule an appointment    I will call the 24 hour scheduling team at   ? Polypharmacy 1/22/2021   ? Radiculitis 4/20/2010    LW Modifier:  Right foot, s/p R AMANDA LW Onset:  2002 ; Neuralgia   ? Recurrent major depressive episodes, in full remission (H) 10/30/2012   ? Renal insufficiency 1/22/2021   ? Rotator cuff tear 1/26/2015    Rotator cuff tear, right   ? S/P lumbar fusion 4/5/2018   ? Spinal stenosis of lumbar region 4/5/2018   ? Suicide attempt by multiple drug overdose, initial encounter (H) 11/27/2020   ? Tourette syndrome 10/30/2012           Surgical History:  Past Surgical History:   Procedure Laterality Date   ? (IA) KS NEE SCOPE MED W LAT MENISCECT WWO DEBRIBE/SHAVE ANY CO     ? ([place]) [place] TOTAL HIP ARTHROPLASTY     ? ELBOW SURGERY     ? KS UNLISTED NECK/THORAX     ? KS UNLISTED ORBIT     ? KS UNLISTED SPINE         Family History:   Family History   Problem Relation Age of Onset   ? Diabetes Mother    ? Diabetes Father        Social History:    Social History     Socioeconomic History   ? Marital status:      Spouse name: Not on file   ? Number of children: Not on file   ? Years of education: Not on file   ? Highest education level: Not on file   Occupational History   ? Not on file   Social Needs   ? Financial resource strain: Not on file   ? Food insecurity     Worry: Not on file     Inability: Not on file   ? Transportation needs     Medical: Not on file     Non-medical: Not on file   Tobacco Use   ? Smoking status: Never Smoker   ? Smokeless tobacco: Never Used   Substance and Sexual Activity   ? Alcohol use: Not on file   ? Drug use: Not on file   ? Sexual activity: Not on file   Lifestyle   ? Physical activity     Days per week: Not on file     Minutes per session: Not on file   ? Stress: Not on file   Relationships   ? Social connections     Talks on phone:  Not on file     Gets together: Not on file     Attends Anabaptist service: Not on file     Active member of club or organization: Not on file     Attends meetings of clubs or organizations: Not on file     Relationship status: Not on file   ? Intimate partner violence     Fear of current or ex partner: Not on file     Emotionally abused: Not on file     Physically abused: Not on file     Forced sexual activity: Not on file   Other Topics Concern   ? Not on file   Social History Narrative   ? Not on file          Review of Systems   Constitutional: Positive for activity change and fatigue. Negative for appetite change, chills, diaphoresis and fever.   HENT: Negative for congestion and hearing loss.    Eyes: Negative.    Respiratory: Negative for shortness of breath.    Cardiovascular: Negative.    Gastrointestinal: Negative for abdominal distention, abdominal pain, blood in stool, constipation, diarrhea and nausea.   Endocrine: Negative.    Genitourinary: Negative.    Musculoskeletal:        L LE has periodic pain, elevated   Skin: Positive for wound.        L LE with VAC   Allergic/Immunologic: Negative.    Neurological: Negative.    Psychiatric/Behavioral: Negative for agitation, confusion and sleep disturbance. The patient is not hyperactive.        There were no vitals filed for this visit.    Physical Exam  Constitutional:       Appearance: He is obese.   HENT:      Head: Normocephalic.      Right Ear: External ear normal.      Left Ear: External ear normal.      Nose: No congestion.      Mouth/Throat:      Pharynx: No oropharyngeal exudate.   Eyes:      General:         Right eye: No discharge.         Left eye: No discharge.   Neck:      Musculoskeletal: Normal range of motion.   Cardiovascular:      Rate and Rhythm: Rhythm irregular.      Comments: A-fib  Pulmonary:      Effort: Pulmonary effort is normal.      Comments: CTA, RA  Abdominal:      General: There is no distension.      Palpations: Abdomen is  soft.      Comments: Denies constipation or diarrhea   Genitourinary:     Comments: No issues reported  Musculoskeletal:      Right lower leg: No edema.      Left lower leg: Edema present.      Comments: LLE pain, elevated, WBAT in boot   Skin:     Comments: L LE   Neurological:      Mental Status: He is oriented to person, place, and time.   Psychiatric:         Mood and Affect: Mood normal.         Medication List:  Current Outpatient Medications   Medication Sig   ? acetaminophen (TYLENOL) 500 MG tablet Take 1,000 mg by mouth 3 (three) times a day. And 1000mg daily PRN, max 4gm/day   ? azelastine (ASTELIN) 137 mcg (0.1 %) nasal spray 2 sprays into each nostril 2 (two) times a day as needed.   ? cetirizine (ZYRTEC) 10 MG tablet Take 10 mg by mouth daily as needed.   ? donepeziL (ARICEPT) 10 MG tablet Take 20 mg by mouth at bedtime.   ? enoxaparin ANTICOAGULANT (LOVENOX) 150 mg/mL injection Inject 135 mg under the skin every 12 (twelve) hours.   ? folic acid (FOLVITE) 1 MG tablet Take 5 mg by mouth daily.   ? guanFACINE (TENEX) 1 MG tablet Take 1 mg by mouth at bedtime.   ? lamoTRIgine (LAMICTAL) 25 MG tablet Take 25 mg by mouth daily.   ? levothyroxine (SYNTHROID, LEVOTHROID) 150 MCG tablet Take 150 mcg by mouth daily.   ? liothyronine (CYTOMEL) 25 MCG tablet Take 25 mcg by mouth daily.   ? melatonin 3 mg Tab tablet Take 3 mg by mouth at bedtime as needed.   ? memantine (NAMENDA) 10 MG tablet Take 10 mg by mouth 2 (two) times a day.   ? mirabegron (MYRBETRIQ) 50 mg Tb24 ER tablet Take 50 mg by mouth daily.   ? oxyCODONE (ROXICODONE) 5 MG immediate release tablet Take 5 mg by mouth every 4 (four) hours as needed.   ? pregabalin (LYRICA) 100 MG capsule Take 100 mg by mouth 3 (three) times a day.   ? propafenone (RYTHMOL) 150 MG tablet Take 150 mg by mouth every 8 (eight) hours as needed.   ? senna-docusate (SENNOSIDES-DOCUSATE SODIUM) 8.6-50 mg tablet Take 1 tablet by mouth 2 (two) times a day as needed for  constipation.   ? silver sulfADIAZINE (SILVADENE, SSD) 1 % cream Apply 1 application topically 2 (two) times a day.   ? simvastatin (ZOCOR) 40 MG tablet Take 40 mg by mouth daily.   ? venlafaxine (EFFEXOR-XR) 150 MG 24 hr capsule Take 300 mg by mouth daily.   ? warfarin sodium (WARFARIN ORAL) Take by mouth. 2/9/21 INR 1.71  Warfarin 7.5mg Sun and Thurs and 5mg AOD.  Cont Lovenox 135mg Q 12 hours.  Next INR 2/12/21.       Labs:  No results found for this or any previous visit.      Assessment/Plan:    Left lower extremity s/p hematoma evacuation and I&D per non healing cellulitis s/p VAC and skin graft placement on 2/3/21: continue VAC, drsg changes as ordered, f/u with Dr Plascencia as ordered    Suspected seizure: prior to hospitalization, had R arm shaking and unresponsiveness, neuro started lamotrigine 25mg daily, will f/u and then increase to 50mg daily and have EEG    Acute right frontal infarct, silent, no neurological deficits: continue statin, anticoagulation, f/u with neurology in 8 wks    Pain control: increase tylenol to 1000mg three times a day and daily PRN, continue oxycodone 5mg q4h PRN (ususally  2x/day) and lyrica 100mg TID    A-fib with MAV: continue coumadin 7.5mg Th/Sat and 5mg AOD, recheck INR on 2/12, continue lovenox until 2.5-3.5    Dementia: continue donepezil 20mg at HS and namenda 10mg two times a day    CLIF: last Cr 1.11 with GFR >60 on 2/1/21    Normocytic anemia: last Hgb 12.6 on 2/6/21    Hypothyroidism: continue synthroid 150mcg daily and cytomel  25mg daily, last TSH 2.17    OAB: continue mirabegron 50mg daily    Constipation: continue senna S PRN    Depression: continue Effexor 300mg daily    Insomnia: continue melatonin 3mg at HS PRN, does not use    Labs: BMP, CBC, vit D, B12    Disposition: lives with wife. SLUMS 22/30    The care plan has been reviewed and all orders signed. Changes to care plan, if any, as noted. Otherwise, continue care plan of care.  The total time spent with  this patient was 38 minutes, with greater than 50% spent in counseling and coordination of care that included multiple issues per extensive surgical hx, f/u with neuro/ortho, pain control and therapy, which lasted 20 minutes.      Electronically signed by: Kem Matta NP

## 2021-06-21 NOTE — PROGRESS NOTES
ANTICOAGULATION MANAGEMENT     Patient Name:  Todd S Aschoff  Date:  2021    ASSESSMENT /SUBJECTIVE:    Today's INR result of 2.1 is subtherapeutic. Goal INR of 2.5-3.5      Warfarin dose taken: Less warfarin taken than planned which may be affecting INR and Warfarin recently held for supratherapeutic INR which may be affecting INR.     Diet: No new diet changes identified    Medication changes/ interactions: No new medications/supplements affecting INR    Previous INR: Therapeutic     S/S of bleeding or thromboembolism: No    New injury or illness: No    Upcoming surgery, procedure or cardioversion: No    Additional findings: None      PLAN:    Warfarin Dosing Instructions: Take 10mg today then continue your current warfarin dose 5mg every Mon, Wed, Fri; 7.5mg all other days    Instructed patient to follow up no later than:   Orders given to  Homecare nurse/facility to recheck    Education provided: Target INR goal and significance of current INR result    Telephone call with home care nurse Anjelica whom verbalizes understanding and agrees to plan    Instructed to call the Anticoagulation Clinic for any changes, questions or concerns. (#[phone])        Oscar Briscoe RN      OBJECTIVE:  Recent labs: (last 7 days)     21  1646 21  0751 21  0833 21   INR 4.5* 3.88* 3.84* 2.50* 2.1*         No question data found.  Anticoagulation Summary  As of 2021    INR goal:  2.5-3.5   TTR:  37.4 % (10.1 mo)   INR used for dosin.1 (2021)   Warfarin maintenance plan:  5 mg (5 mg x 1) every Mon, Wed, Fri; 7.5 mg (5 mg x 1.5) all other days   Full warfarin instructions:  : 10 mg; Otherwise 5 mg every Mon, Wed, Fri; 7.5 mg all other days   Weekly warfarin total:  45 mg   Plan last modified:  Britta Snowden RN (2021)   Next INR check:     Priority:  Maintenance   Target end date:  Indefinite    Indications    Mechanical AV Replacement  -- on Warfarin   [Z95.2]  PAF (paroxysmal atrial fibrillation) (H) [I48.0]  Long term current use of anticoagulant therapy (Resolved) [Z79.01]  Chronic atrial fibrillation (H) (Resolved) [I48.20]             Anticoagulation Episode Summary     INR check location:      Preferred lab:  EXTERNAL LAB    Send INR reminders to:  AIDA MCWILLIAMS    Comments:  5mg tabs / CALENDAR / needs bridging. may leave DVM or speak with Paul, per signed consent // Home care      Anticoagulation Care Providers     Provider Role Specialty Phone number    Obdulio Ingram MD Referring Internal Medicine [phone]

## 2021-06-21 NOTE — PROGRESS NOTES
DATE & TIME: 06/20/2021, 2278-7102   Cognitive Concerns/ Orientation : A & O times four  BEHAVIOR & AGGRESSION TOOL COLOR: calm  ABNL VS/O2: VSS, room air  MOBILITY: Independent  PAIN MANAGMENT: Denied  DIET: regular  BOWEL/BLADDER: continent  ABNL LAB/BG: INR=2.5  D/C DATE: today, discharge instruction reviewed with the patient. Copy of AVS given. He is discharged to home  OTHER IMPORTANT INFO: Neuro assessment WDL. Instructed to follow up INR tomorrow before dosing

## 2021-06-21 NOTE — LETTER
Letter by Felipe Mancuso DO at      Author: Felipe Mancuso DO Service: -- Author Type: --    Filed:  Encounter Date: 2/15/2021 Status: (Other)         Patient: Todd S Aschoff   MR Number: [id number]   YOB: 1955   Date of Visit: 2/15/2021     [facility]      Facility:    [facility] [[id number]]  Code Status: UNKNOWN      Chief Complaint/Reason for Visit:  Chief Complaint   Patient presents with   ? H & P     Recent hospitalization for CVA, atrial fibrillation, mechanical aortic valve, chronic pain, dementia, depression.  Left leg wound with drainage and nonhealing, acute toxic metabolic encephalopathy that did resolve, acute kidney injury that did improve, left shoulder pain, hypotension, chronic anticoagulation, acute anemia.       HPI:   Nikco is a 65 y.o. male who is recently admitted to the hospital previously on 192 113 for left leg wound with drainage.  He was found to have infected hematoma requiring I&D on 110 and then ultimately was discharged home on antibiotics.  He was then readmitted on on 1/22/2021 for generalized weakness.  And did have a CVA.  His his his deficits did resolve and okay also likely seizure.  He underwent surgery with plastics on 126 with removal of 1400 mL of the hematoma with no active bleeding.  He is on Coumadin at this time and he did get placed on a wound VAC.  Wound is improving and he will be undergoing Monday Wednesday and Friday wound VAC changes.    For CVA did have a right frontal infarct suspect this is a silent infarct disease no neurological deficits at this time.  There was seizure-like activity and he had right arm shaking with unresponsiveness.  Lamotrigine was started by neurology and seizure precautions were followed.  He will follow up outpatient with neurology.    He does have his hematoma status post I&D and does a wound VAC.  He did grow E. coli Enterococcus bacillus felt that E. coli was the  pathogen and was IV ceftriaxone at the time of discharge Zaroxolyn made for an additional 7 days.  Is acute toxic metabolic encephalopathy did resolve at this time he did have acute kidney injury in the hospital repeat creatinine 1.66.  He was quite hypotensive at this time and was recommend no NSAIDs and periodic basic metabolic profile be done tomorrow.  He does have chronic pain at this time and also his hypotension did resolve.  Patient was treated properly and transferred here to the TCU at [facility] in stable condition.    Patient does not have a wound VAC on at this time as mentioned in the notes however he does have a wound graft on there.  There is bleeding underneath his graft site on his upper thigh.  We are going to investigate this when the nurse gets ready and she can do a dressing change all at once.  He otherwise has no new concerns.    Past Medical History:  Past Medical History:   Diagnosis Date   ? Advanced directives, counseling/discussion 1/6/2012    Discussed Advance Directive planning with patient; information given to patient to review.   ? Alcohol dependence in remission (H) 8/2/2018   ? Anemia, unspecified type 1/22/2021   ? Aortic valve prosthesis present 2/8/2021   ? Benign prostatic hyperplasia 2/8/2021   ? Bilateral thoracic back pain 11/16/2016   ? Chronic atrial fibrillation (H) 8/7/2002   ? Chronic low back pain 8/19/2011   ? Depressive disorder 6/9/2010    Depression  NOS   ? Elevated prostate specific antigen (PSA) 1/22/2020   ? Family history of prostate cancer 1/22/2020   ? Generalized muscle weakness 1/22/2021   ? 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 Hyperlipidemia LDL goal <130 10/31/2010   ? Hypotension, unspecified hypotension type 1/22/2021   ? Hypothyroidism 8/7/2002    Hypothyroidism On Replacement Problem list name updated by automated process. Provider to review   ? Leg wound, left 1/9/2021   ? Long term current use of anticoagulant  therapy 8/7/2002    LW Modifier:  Coumadin, since mechanical aortic valve LW Onset:  1992 ; Anticoagulant Rx Long Term Problem list name updated by automated process. Provider to review   ? Lumbar radiculopathy 4/3/2013   ? Lumbar surgical wound fluid collection 5/23/2013   ? Memory difficulties 1/16/2015   ? Morbid obesity (H) 4/20/2010    LW Modifier:  BMI 41.3 (Apr 2010) ; Obesity Morbid   ? OAB (overactive bladder) 5/11/2015   ? Persons encountering health services in other specified circumstances 4/3/2012    Formatting of this note might be different from the original. EMERGENCY CARE PLAN Presenting Problem Signs and Symptoms Treatment Plan   Questions or conerns during clinic hours    I will call the clinic directly    Questions or conerns outside clinic hours    I will call the 24 hour nurse line at [phone]   Patient needs to schedule an appointment    I will call the 24 hour scheduling team at   ? Polypharmacy 1/22/2021   ? Radiculitis 4/20/2010    LW Modifier:  Right foot, s/p R AMANDA LW Onset:  2002 ; Neuralgia   ? Recurrent major depressive episodes, in full remission (H) 10/30/2012   ? Renal insufficiency 1/22/2021   ? Rotator cuff tear 1/26/2015    Rotator cuff tear, right   ? S/P lumbar fusion 4/5/2018   ? Spinal stenosis of lumbar region 4/5/2018   ? Suicide attempt by multiple drug overdose, initial encounter (H) 11/27/2020   ? Tourette syndrome 10/30/2012           Surgical History:  Past Surgical History:   Procedure Laterality Date   ? (IA) CO NEE SCOPE MED W LAT MENISCECT WWO DEBRIBE/SHAVE ANY CO     ? (IA) CO TOTAL HIP ARTHROPLASTY     ? ELBOW SURGERY     ? CO UNLISTED NECK/THORAX     ? CO UNLISTED ORBIT     ? CO UNLISTED SPINE         Family History:   Family History   Problem Relation Age of Onset   ? Diabetes Mother    ? Diabetes Father        Social History:    Social History     Socioeconomic History   ? Marital status:      Spouse name: None   ? Number of children: None   ? Years  of education: None   ? Highest education level: None   Occupational History   ? None   Social Needs   ? Financial resource strain: None   ? Food insecurity     Worry: None     Inability: None   ? Transportation needs     Medical: None     Non-medical: None   Tobacco Use   ? Smoking status: Never Smoker   ? Smokeless tobacco: Never Used   Substance and Sexual Activity   ? Alcohol use: None   ? Drug use: None   ? Sexual activity: None   Lifestyle   ? Physical activity     Days per week: None     Minutes per session: None   ? Stress: None   Relationships   ? Social connections     Talks on phone: None     Gets together: None     Attends Yazidi service: None     Active member of club or organization: None     Attends meetings of clubs or organizations: None     Relationship status: None   ? Intimate partner violence     Fear of current or ex partner: None     Emotionally abused: None     Physically abused: None     Forced sexual activity: None   Other Topics Concern   ? None   Social History Narrative   ? None          Review of Systems   Constitutional:        Patient denies any pain fevers chills nausea vomit diarrhea change in vision hearing taste or smell weakness one-sided of the chest pain shortness of breath.  Denies any current shortness stool polyphagia polydipsia polyuria depression or anxiety and the main review of systems is negative.       Vitals:    02/15/21 0954   BP: 143/80   Pulse: 77   Resp: 16   Temp: 97  F (36.1  C)   SpO2: 96%       Physical Exam  Constitutional:       General: He is not in acute distress.     Appearance: He is not toxic-appearing.   HENT:      Head: Normocephalic and atraumatic.      Nose: Nose normal.      Mouth/Throat:      Mouth: Mucous membranes are moist.   Cardiovascular:      Comments: Patient's heart sounds are irregularly irregular with mechanical click.  Pulmonary:      Effort: Pulmonary effort is normal. No respiratory distress.      Breath sounds: Normal breath  sounds. No wheezing.   Abdominal:      General: Bowel sounds are normal. There is distension.      Tenderness: There is no abdominal tenderness.   Musculoskeletal:      Comments: The site of the donor graft on the upper thigh does have blood underneath the dressing at this time.  We will investigate further.  We will investigate the rest of the wound when the nurses ready for wound rounds today.   Skin:     General: Skin is warm and dry.   Neurological:      Mental Status: He is alert. Mental status is at baseline.   Psychiatric:         Mood and Affect: Mood normal.         Behavior: Behavior normal.         Medication List:  Current Outpatient Medications   Medication Sig   ? acetaminophen (TYLENOL) 500 MG tablet Take 1,000 mg by mouth 3 (three) times a day. And 1000mg daily PRN, max 4gm/day   ? azelastine (ASTELIN) 137 mcg (0.1 %) nasal spray 2 sprays into each nostril 2 (two) times a day as needed.   ? cetirizine (ZYRTEC) 10 MG tablet Take 10 mg by mouth daily as needed.   ? donepeziL (ARICEPT) 10 MG tablet Take 20 mg by mouth at bedtime.   ? enoxaparin ANTICOAGULANT (LOVENOX) 150 mg/mL injection Inject 135 mg under the skin every 12 (twelve) hours.   ? folic acid (FOLVITE) 1 MG tablet Take 5 mg by mouth daily.   ? guanFACINE (TENEX) 1 MG tablet Take 1 mg by mouth at bedtime.   ? lamoTRIgine (LAMICTAL) 25 MG tablet Take 25 mg by mouth daily.   ? levothyroxine (SYNTHROID, LEVOTHROID) 150 MCG tablet Take 150 mcg by mouth daily.   ? liothyronine (CYTOMEL) 25 MCG tablet Take 25 mcg by mouth daily.   ? melatonin 3 mg Tab tablet Take 3 mg by mouth at bedtime as needed.   ? memantine (NAMENDA) 10 MG tablet Take 10 mg by mouth 2 (two) times a day.   ? mirabegron (MYRBETRIQ) 50 mg Tb24 ER tablet Take 50 mg by mouth daily.   ? oxyCODONE (ROXICODONE) 5 MG immediate release tablet Take 5 mg by mouth every 4 (four) hours as needed.   ? pregabalin (LYRICA) 100 MG capsule Take 100 mg by mouth 3 (three) times a day.   ?  propafenone (RYTHMOL) 150 MG tablet Take 150 mg by mouth every 8 (eight) hours as needed.   ? senna-docusate (SENNOSIDES-DOCUSATE SODIUM) 8.6-50 mg tablet Take 1 tablet by mouth 2 (two) times a day as needed for constipation.   ? silver sulfADIAZINE (SILVADENE, SSD) 1 % cream Apply 1 application topically 2 (two) times a day.   ? simvastatin (ZOCOR) 40 MG tablet Take 40 mg by mouth daily.   ? venlafaxine (EFFEXOR-XR) 150 MG 24 hr capsule Take 300 mg by mouth daily.   ? warfarin sodium (WARFARIN ORAL) Take by mouth. 2/12/21 INR 1.97 Give 6mg tonight & tomorrow, 7.5mg on Sun. Continue Lovenox. Next INR 2/15.  2/9/21 INR 1.71  Warfarin 7.5mg Sun and Thurs and 5mg AOD.  Cont Lovenox 135mg Q 12 hours.  Next INR 2/12/21.       Labs:      Assessment:    ICD-10-CM    1. Cerebrovascular accident (CVA), unspecified mechanism (H)  I63.9    2. Aortic valve prosthesis present  Z95.2    3. Chronic atrial fibrillation (H)  I48.20    4. Long term current use of anticoagulant therapy  Z79.01    5. Depressive disorder  F32.9    6. Anemia, unspecified type  D64.9    7. Physical deconditioning  R53.81    8. Wound of left lower extremity, subsequent encounter  S81.478J        Plan: Plan at this time we will look at his wounds today when the nurses ready.  He will stand his long-term anticoagulant we will address his INR today as it is due today to come in.  There is no signs of any new CVA at this time and his mechanical valve sounds intact.  His atrial fibrillation has good rate control so no new changes because of the labs in the hospital I will get a CBC tomorrow and a basic metabolic profile tomorrow and we will investigate the wounds today on wound rounds when the nurses have finished their med Pass this morning.  I will continue to monitor above medical problems and no other changes to care plan at this time.        Electronically signed by: Felipe Mancuso,

## 2021-06-21 NOTE — PROGRESS NOTES
ANTICOAGULATION  MANAGEMENT: Discharge Review    Todd S Aschoff chart reviewed for anticoagulation continuity of care    Hospital Admission on 6/18 for left ho-paresthesias.    Discharge disposition: Home with Home Care    Results:    Recent labs: (last 7 days)     06/18/21 06/18/21  1646 06/19/21  0751 06/20/21  0833 06/21/21   INR 4.5* 3.88* 3.84* 2.50* 2.1*     Anticoagulation inpatient management:     held first night of admission, adjusted based on daily INR    Anticoagulation discharge instructions:     Warfarin dosing: home regimen continued   Bridging: No   INR goal change: No      Medication changes affecting anticoagulation: No    Additional factors affecting anticoagulation: No    Plan     No adjustment to anticoagulation plan needed    INR was completed today    Anticoagulation Calendar updated    Linda Tang RN

## 2021-06-21 NOTE — LETTER
Letter by Era Eisenberg CNP at      Author: Era Eisenberg CNP Service: -- Author Type: --    Filed:  Encounter Date: 2/9/2021 Status: (Other)         Patient: Todd S Aschoff   MR Number: [id number]   YOB: 1955   Date of Visit: 2/9/2021     [facility]    Facility:   [facility] [[id number]]   Code Status: FULL CODE      CHIEF COMPLAINT/REASON FOR VISIT:  Chief Complaint   Patient presents with   ? Review Of Multiple Medical Conditions     review VS, labs, F/U left leg wound, pain management . encounter for anticoagulation        HISTORY:      HPI: Nicko is a 65 y.o. male undergoing physical and occupational therapy at [facility].  He is with past medical history of aortic dissection status post mechanical aortic valve plus graft 1992, atrial fibrillation currently on Coumadin, chronic pain, dementia and depression who was hospitalized [address] for left leg wound with drainage initially caused by a fall approximately 1 month prior.  His wound was found to be infected with a hematoma requiring an I&D on 1/10/2021.  He was then discharged home on oral antibiotics.  He was readmitted on 1/22/2021 with generalized weakness confusion and hypotension.  Found to have an acute CVA but no longer has any focal deficits.  Per the records it appears she was also likely to have a seizure prior to the admission.  On 1/26/2021 patient underwent surgery with removal of 1400 mils of a hematoma no active bleeding and placement of a wound VAC.  He has been undergoing Monday Wednesday Friday wound VAC changes.  He returned to the operating room on 2/3/2020 for skin graft.  He is currently on Coumadin and being bridged with enoxaparin.    Today he being seen to review VS, labs, monitor left leg wound and pain management. He denied CP or shortness of breath, denied cough or congestion, diarrhea or constipation . His pain is controlled.  He no longer has a wound VAC in  place and has dressing changes to be done.  His upper thigh donor site is with Tegaderm to be left in place and reinforced with ABD pads.  He also has a left lower leg incision that is stapled closed and then another area that is stapled in a circular fashion and open in the middle.  No signs or symptoms of infection noted.  His INR is subtherapeutic today and he will continue on enoxaparin injections twice daily until therapeutic.  Previous labs were reviewed.    Past Medical History:   Diagnosis Date   ? Advanced directives, counseling/discussion 1/6/2012    Discussed Advance Directive planning with patient; information given to patient to review.   ? Alcohol dependence in remission (H) 8/2/2018   ? Anemia, unspecified type 1/22/2021   ? Aortic valve prosthesis present 2/8/2021   ? Benign prostatic hyperplasia 2/8/2021   ? Bilateral thoracic back pain 11/16/2016   ? Chronic atrial fibrillation (H) 8/7/2002   ? Chronic low back pain 8/19/2011   ? Depressive disorder 6/9/2010    Depression  NOS   ? Elevated prostate specific antigen (PSA) 1/22/2020   ? Family history of prostate cancer 1/22/2020   ? Generalized muscle weakness 1/22/2021   ? 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 Hyperlipidemia LDL goal <130 10/31/2010   ? Hypotension, unspecified hypotension type 1/22/2021   ? Hypothyroidism 8/7/2002    Hypothyroidism On Replacement Problem list name updated by automated process. Provider to review   ? Leg wound, left 1/9/2021   ? Long term current use of anticoagulant therapy 8/7/2002    LW Modifier:  Coumadin, since mechanical aortic valve LW Onset:  1992 ; Anticoagulant Rx Long Term Problem list name updated by automated process. Provider to review   ? Lumbar radiculopathy 4/3/2013   ? Lumbar surgical wound fluid collection 5/23/2013   ? Memory difficulties 1/16/2015   ? Morbid obesity (H) 4/20/2010    LW Modifier:  BMI 41.3 (Apr 2010) ; Obesity Morbid   ? OAB (overactive bladder) 5/11/2015    ? Persons encountering health services in other specified circumstances 4/3/2012    Formatting of this note might be different from the original. EMERGENCY CARE PLAN Presenting Problem Signs and Symptoms Treatment Plan   Questions or conerns during clinic hours    I will call the clinic directly    Questions or conerns outside clinic hours    I will call the 24 hour nurse line at [phone]   Patient needs to schedule an appointment    I will call the 24 hour scheduling team at   ? Polypharmacy 1/22/2021   ? Radiculitis 4/20/2010    LW Modifier:  Right foot, s/p R AMANDA LW Onset:  2002 ; Neuralgia   ? Recurrent major depressive episodes, in full remission (H) 10/30/2012   ? Renal insufficiency 1/22/2021   ? Rotator cuff tear 1/26/2015    Rotator cuff tear, right   ? S/P lumbar fusion 4/5/2018   ? Spinal stenosis of lumbar region 4/5/2018   ? Suicide attempt by multiple drug overdose, initial encounter (H) 11/27/2020   ? Tourette syndrome 10/30/2012             Family History   Problem Relation Age of Onset   ? Diabetes Mother    ? Diabetes Father      Social History     Socioeconomic History   ? Marital status:      Spouse name: Not on file   ? Number of children: Not on file   ? Years of education: Not on file   ? Highest education level: Not on file   Occupational History   ? Not on file   Social Needs   ? Financial resource strain: Not on file   ? Food insecurity     Worry: Not on file     Inability: Not on file   ? Transportation needs     Medical: Not on file     Non-medical: Not on file   Tobacco Use   ? Smoking status: Never Smoker   ? Smokeless tobacco: Never Used   Substance and Sexual Activity   ? Alcohol use: Not on file   ? Drug use: Not on file   ? Sexual activity: Not on file   Lifestyle   ? Physical activity     Days per week: Not on file     Minutes per session: Not on file   ? Stress: Not on file   Relationships   ? Social connections     Talks on phone: Not on file     Gets together: Not  on file     Attends [employer] service: Not on file     Active member of club or organization: Not on file     Attends meetings of clubs or organizations: Not on file     Relationship status: Not on file   ? Intimate partner violence     Fear of current or ex partner: Not on file     Emotionally abused: Not on file     Physically abused: Not on file     Forced sexual activity: Not on file   Other Topics Concern   ? Not on file   Social History Narrative   ? Not on file         Review of Systems   Constitutional: Positive for activity change. Negative for appetite change, chills, fatigue and fever.        Weightbearing as tolerated with boot on   HENT: Negative for congestion and sore throat.    Eyes: Negative for visual disturbance.   Respiratory: Negative for cough, shortness of breath and wheezing.    Cardiovascular: Positive for leg swelling. Negative for chest pain.   Gastrointestinal: Negative for abdominal distention, abdominal pain, constipation, diarrhea and nausea.   Genitourinary: Negative for dysuria.   Musculoskeletal: Negative for arthralgias and myalgias.   Skin: Positive for wound. Negative for color change and rash.   Neurological: Negative for dizziness, weakness and numbness.        History of CVA no deficits at this time   Psychiatric/Behavioral: Negative for agitation, behavioral problems and sleep disturbance.       Vitals:    02/09/21 2058   BP: 130/76   Pulse: 68   Resp: 16   Temp: 97.8  F (36.6  C)   SpO2: 95%   Weight: (!) 313 lb (142 kg)       Physical Exam  Constitutional:       Appearance: He is well-developed.      Comments: Pleasant gentleman in no acute distress   HENT:      Head: Normocephalic.   Eyes:      Conjunctiva/sclera: Conjunctivae normal.   Neck:      Musculoskeletal: Normal range of motion.   Cardiovascular:      Rate and Rhythm: Normal rate and regular rhythm.      Heart sounds: Normal heart sounds. No murmur.      Comments: Patient with a mechanical valve  Pulmonary:       Effort: No respiratory distress.      Breath sounds: Normal breath sounds. No wheezing or rales.   Abdominal:      General: Bowel sounds are normal. There is no distension.      Palpations: Abdomen is soft.      Tenderness: There is no abdominal tenderness.   Musculoskeletal: Normal range of motion.   Skin:     General: Skin is warm.      Comments: Wound left leg.  Donor site left upper thigh.  Graft site left lower leg staples intact no signs or symptoms of infection   Neurological:      Mental Status: He is alert and oriented to person, place, and time.   Psychiatric:         Behavior: Behavior normal.           LABS:   Recent Results (from the past 240 hour(s))   INR   Result Value Ref Range    INR 1.71 (H) 0.90 - 1.10     Current Outpatient Medications   Medication Sig   ? acetaminophen (TYLENOL) 500 MG tablet Take 500-1,000 mg by mouth every 8 (eight) hours as needed.   ? azelastine (ASTELIN) 137 mcg (0.1 %) nasal spray 2 sprays into each nostril 2 (two) times a day as needed.   ? cetirizine (ZYRTEC) 10 MG tablet Take 10 mg by mouth daily as needed.   ? donepeziL (ARICEPT) 10 MG tablet Take 20 mg by mouth at bedtime.   ? enoxaparin ANTICOAGULANT (LOVENOX) 150 mg/mL injection Inject 135 mg under the skin every 12 (twelve) hours.   ? folic acid (FOLVITE) 1 MG tablet Take 5 mg by mouth daily.   ? guanFACINE (TENEX) 1 MG tablet Take 1 mg by mouth at bedtime.   ? lamoTRIgine (LAMICTAL) 25 MG tablet Take 25 mg by mouth daily.   ? levothyroxine (SYNTHROID, LEVOTHROID) 150 MCG tablet Take 150 mcg by mouth daily.   ? liothyronine (CYTOMEL) 25 MCG tablet Take 25 mcg by mouth daily.   ? melatonin 3 mg Tab tablet Take 3 mg by mouth at bedtime as needed.   ? memantine (NAMENDA) 10 MG tablet Take 10 mg by mouth 2 (two) times a day.   ? mirabegron (MYRBETRIQ) 50 mg Tb24 ER tablet Take 50 mg by mouth daily.   ? oxyCODONE (ROXICODONE) 5 MG immediate release tablet Take 5 mg by mouth every 4 (four) hours as needed.   ?  pregabalin (LYRICA) 100 MG capsule Take 100 mg by mouth 3 (three) times a day.   ? propafenone (RYTHMOL) 150 MG tablet Take 150 mg by mouth every 8 (eight) hours as needed.   ? senna-docusate (SENNOSIDES-DOCUSATE SODIUM) 8.6-50 mg tablet Take 1 tablet by mouth 2 (two) times a day as needed for constipation.   ? silver sulfADIAZINE (SILVADENE, SSD) 1 % cream Apply 1 application topically 2 (two) times a day.   ? simvastatin (ZOCOR) 40 MG tablet Take 40 mg by mouth daily.   ? venlafaxine (EFFEXOR-XR) 150 MG 24 hr capsule Take 300 mg by mouth daily.   ? warfarin sodium (WARFARIN ORAL) Take by mouth. 2/9/21 INR 1.71  Warfarin 7.5mg Sun and Thurs and 5mg AOD.  Cont Lovenox 135mg Q 12 hours.  Next INR 2/12/21.     ASSESSMENT:      ICD-10-CM    1. Anemia, unspecified type  D64.9    2. Long term current use of anticoagulant therapy  Z79.01    3. Physical deconditioning  R53.81    4. Wound of left lower extremity, subsequent encounter  S89.816J        PLAN:    Anemia patient did have a large hematoma evacuated on his left lower extremity on 126 of 1400 mls, monitor hemoglobin.  Hemoglobin on 2/6/2021 was 12.6.    Anticoagulation patient on chronic Coumadin for mechanical valve placement in 1992.  INR range 2.5-3.5.  Continue Lovenox twice daily until therapeutic.    Physical deconditioning PT OT    Left leg wound dressing changes per orders follow-up with surgery.  Hematoma evacuated on 1/26/2020 for 1400 mils completed antibiotics    Suspected seizure patient with an abnormal MRI.  Started on lamotrigine per neurology, seizure precautions follow-up with neurology no seizure activity since admission to this facility.  He will need follow-up in 1 to 2 weeks with neurology for an increase in his seizure medication.    Left shoulder pain orthopedics was consulted no fracture patient did undergo a subacromial injection follow-up with [facility] for further work-up if pain persists    Pain management scheduled Tylenol and  as needed oxycodone, on Lyrica    Atrial fibrillation patient on Coumadin being bridged with enoxaparin and also on propafenone    Insomnia on melatonin    Hypothyroidism continue Synthroid and liothyronine TSH on 11/30/2020 was 2.17    Depression continue Effexor    Dementia on Namenda and Aricept.    Electronically signed by: Era Eisenberg CNP

## 2021-06-21 NOTE — TELEPHONE ENCOUNTER
IP F/U    Date: 06/20/21  Diagnosis: Aortic Valve Prosthesis Present, Spells Of Decreased Attentiveness  Is patient active in care coordination? No  Was patient in TCU? No    Pt contacted by care coordination.

## 2021-06-21 NOTE — LETTER
Letter by Kem Matta NP at      Author: Kem Matta NP Service: -- Author Type: --    Filed:  Encounter Date: 2/18/2021 Status: (Other)         Patient: Todd S Aschoff   MR Number: [id number]   YOB: 1955   Date of Visit: 2/18/2021     [facility]      Facility:    [facility] [[id number]]  Code Status: FULL CODE      Chief Complaint/Reason for Visit:  402  Chief Complaint   Patient presents with   ? Discharge Summary       HPI:   Nicko is a 65 y.o. male who is a transfer from [facility] with an admission on 1/22/21 and discharge on 2/7/21. He has a PMH of A-fib on coumadin with MAV, hypothyroidism, depression, dementia, who was hospitalized 1/9-1/13/21 there for left leg wound with drainage requiring I&D (initially from a fall) per hematoma formation and then was discharged home. He was then re-admitted with generalized weakness, confusion and hypotension. He was also found to have an acute CVA w/o local deficits and had a seizure prior to admission (apparently right arm shaking with unresponsiveness). Per CT imaging found to have right occipital infarct and MRI showed an acute infarct in the right frontal vertex and multiple chronic infarcts in the right occipital lobe and bilateral cerebellar hemispheres. Further, MRA showed possible diffuse stenosis of the left PCA. TTE unchanged.  Neuro consulted, started on lamotrigine and will f/u with neuro outpatient. Per chart review during prior hospitalization initially found to have infected hematoma, underwent I&D on 1/10, wound grew E. Coli, given rocephin and discharged on cefuroxime per ID. He then had a dehiscence of wound with necrosis, underwent surgery with plastics on 1/26 and had 1.4L blood removal with VAC placement with changes on M/W/Fr. He then had skin graft placement on 2/3/21 with ongoing VAC usage. Of note he was supra therapeutic, required vit K prior to  surgery as well. He also developed CHARISSA with peak Cr of 1.66, which normalized. He was then discharged to the TCU for rehab.    He has concluded his TCU stay and will be discharged to home with services on 2/20/21.    Past Medical History:  Past Medical History:   Diagnosis Date   ? Advanced directives, counseling/discussion 1/6/2012    Discussed Advance Directive planning with patient; information given to patient to review.   ? Alcohol dependence in remission (H) 8/2/2018   ? Anemia, unspecified type 1/22/2021   ? Aortic valve prosthesis present 2/8/2021   ? Benign prostatic hyperplasia 2/8/2021   ? Bilateral thoracic back pain 11/16/2016   ? Chronic atrial fibrillation (H) 8/7/2002   ? Chronic low back pain 8/19/2011   ? Depressive disorder 6/9/2010    Depression  NOS   ? Elevated prostate specific antigen (PSA) 1/22/2020   ? Family history of prostate cancer 1/22/2020   ? Generalized muscle weakness 1/22/2021   ? 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 Hyperlipidemia LDL goal <130 10/31/2010   ? Hypotension, unspecified hypotension type 1/22/2021   ? Hypothyroidism 8/7/2002    Hypothyroidism On Replacement Problem list name updated by automated process. Provider to review   ? Leg wound, left 1/9/2021   ? Long term current use of anticoagulant therapy 8/7/2002    LW Modifier:  Coumadin, since mechanical aortic valve LW Onset:  1992 ; Anticoagulant Rx Long Term Problem list name updated by automated process. Provider to review   ? Lumbar radiculopathy 4/3/2013   ? Lumbar surgical wound fluid collection 5/23/2013   ? Memory difficulties 1/16/2015   ? Morbid obesity (H) 4/20/2010    LW Modifier:  BMI 41.3 (Apr 2010) ; Obesity Morbid   ? OAB (overactive bladder) 5/11/2015   ? Persons encountering health services in other specified circumstances 4/3/2012    Formatting of this note might be different from the original. EMERGENCY CARE PLAN Presenting Problem Signs and Symptoms Treatment Plan    Questions or conerns during clinic hours    I will call the clinic directly    Questions or conerns outside clinic hours    I will call the 24 hour nurse line at [phone]   Patient needs to schedule an appointment    I will call the 24 hour scheduling team at   ? Polypharmacy 1/22/2021   ? Radiculitis 4/20/2010    LW Modifier:  Right foot, s/p R AMANDA LW Onset:  2002 ; Neuralgia   ? Recurrent major depressive episodes, in full remission (H) 10/30/2012   ? Renal insufficiency 1/22/2021   ? Rotator cuff tear 1/26/2015    Rotator cuff tear, right   ? S/P lumbar fusion 4/5/2018   ? Spinal stenosis of lumbar region 4/5/2018   ? Suicide attempt by multiple drug overdose, initial encounter (H) 11/27/2020   ? Tourette syndrome 10/30/2012           Surgical History:  Past Surgical History:   Procedure Laterality Date   ? (IA) IL NEE SCOPE MED W LAT MENISCECT WWO DEBRIBE/SHAVE ANY CO     ? (IA) IL TOTAL HIP ARTHROPLASTY     ? ELBOW SURGERY     ? IL UNLISTED NECK/THORAX     ? IL UNLISTED ORBIT     ? IL UNLISTED SPINE         Family History:   Family History   Problem Relation Age of Onset   ? Diabetes Mother    ? Diabetes Father        Social History:    Social History     Socioeconomic History   ? Marital status:      Spouse name: Not on file   ? Number of children: Not on file   ? Years of education: Not on file   ? Highest education level: Not on file   Occupational History   ? Not on file   Social Needs   ? Financial resource strain: Not on file   ? Food insecurity     Worry: Not on file     Inability: Not on file   ? Transportation needs     Medical: Not on file     Non-medical: Not on file   Tobacco Use   ? Smoking status: Never Smoker   ? Smokeless tobacco: Never Used   Substance and Sexual Activity   ? Alcohol use: Not on file   ? Drug use: Not on file   ? Sexual activity: Not on file   Lifestyle   ? Physical activity     Days per week: Not on file     Minutes per session: Not on file   ? Stress: Not on file    Relationships   ? Social connections     Talks on phone: Not on file     Gets together: Not on file     Attends Confucianist service: Not on file     Active member of club or organization: Not on file     Attends meetings of clubs or organizations: Not on file     Relationship status: Not on file   ? Intimate partner violence     Fear of current or ex partner: Not on file     Emotionally abused: Not on file     Physically abused: Not on file     Forced sexual activity: Not on file   Other Topics Concern   ? Not on file   Social History Narrative   ? Not on file          Review of Systems   Constitutional: Positive for activity change. Negative for appetite change, chills, diaphoresis, fatigue and fever.   HENT: Negative for congestion and hearing loss.    Eyes: Negative.    Respiratory: Negative for shortness of breath.    Cardiovascular: Negative.    Gastrointestinal: Negative for abdominal distention, abdominal pain, blood in stool, constipation, diarrhea and nausea.   Endocrine: Negative.    Genitourinary: Negative.    Musculoskeletal:        L LE has periodic pain, elevated   Skin: Positive for wound.        L LE with VAC   Allergic/Immunologic: Negative.    Neurological: Negative.    Psychiatric/Behavioral: Negative for agitation, confusion and sleep disturbance. The patient is not hyperactive.        Vitals:    02/18/21 0737   BP: 138/83   Pulse: 67   Resp: 18   Temp: 97  F (36.1  C)   SpO2: 94%   Weight: (!) 299 lb (135.6 kg)       Physical Exam  Constitutional:       Appearance: He is obese.   HENT:      Head: Normocephalic.      Right Ear: External ear normal.      Left Ear: External ear normal.      Nose: No congestion.      Mouth/Throat:      Pharynx: No oropharyngeal exudate.   Eyes:      General:         Right eye: No discharge.         Left eye: No discharge.   Neck:      Musculoskeletal: Normal range of motion.   Cardiovascular:      Rate and Rhythm: Rhythm irregular.      Comments: A-fib  Pulmonary:       Effort: Pulmonary effort is normal.      Comments: CTA, RA  Abdominal:      General: There is no distension.      Palpations: Abdomen is soft.      Comments: Denies constipation or diarrhea   Genitourinary:     Comments: No issues reported  Musculoskeletal:      Right lower leg: No edema.      Left lower leg: Edema present.      Comments: LLE pain, elevated, WBAT in boot   Skin:     Comments: L LE   Neurological:      Mental Status: He is oriented to person, place, and time.   Psychiatric:         Mood and Affect: Mood normal.         Medication List:  Current Outpatient Medications   Medication Sig   ? acetaminophen (TYLENOL) 500 MG tablet Take 1,000 mg by mouth 3 (three) times a day. And 1000mg daily PRN, max 4gm/day   ? azelastine (ASTELIN) 137 mcg (0.1 %) nasal spray 2 sprays into each nostril 2 (two) times a day as needed.   ? cetirizine (ZYRTEC) 10 MG tablet Take 10 mg by mouth daily as needed.   ? donepeziL (ARICEPT) 10 MG tablet Take 20 mg by mouth at bedtime.   ? folic acid (FOLVITE) 1 MG tablet Take 5 mg by mouth daily.   ? guanFACINE (TENEX) 1 MG tablet Take 1 mg by mouth at bedtime.   ? lamoTRIgine (LAMICTAL) 25 MG tablet Take 25 mg by mouth daily.   ? levothyroxine (SYNTHROID, LEVOTHROID) 150 MCG tablet Take 150 mcg by mouth daily.   ? liothyronine (CYTOMEL) 25 MCG tablet Take 25 mcg by mouth daily.   ? melatonin 3 mg Tab tablet Take 3 mg by mouth at bedtime as needed.   ? memantine (NAMENDA) 10 MG tablet Take 10 mg by mouth 2 (two) times a day.   ? mirabegron (MYRBETRIQ) 50 mg Tb24 ER tablet Take 50 mg by mouth daily.   ? pregabalin (LYRICA) 100 MG capsule Take 1 capsule (100 mg total) by mouth 3 (three) times a day.   ? propafenone (RYTHMOL) 150 MG tablet Take 150 mg by mouth every 8 (eight) hours as needed.   ? senna-docusate (SENNOSIDES-DOCUSATE SODIUM) 8.6-50 mg tablet Take 1 tablet by mouth 2 (two) times a day as needed for constipation.   ? silver sulfADIAZINE (SILVADENE, SSD) 1 % cream  Apply 1 application topically 2 (two) times a day.   ? simvastatin (ZOCOR) 40 MG tablet Take 40 mg by mouth daily.   ? venlafaxine (EFFEXOR-XR) 150 MG 24 hr capsule Take 300 mg by mouth daily.   ? warfarin sodium (WARFARIN ORAL) Take by mouth. 2/19/21 INR 2.81  Cont 7.5mg Sun and Thurs and 5mg AOD.   Next INR 2/24/21 with home care RN.    2/15/21 INR 2.65  Take 7.5mg Sun and Thurs and 5mg AOD.  Next INR 2/19/21.    2/12/21 INR 1.97 Give 6mg tonight & tomorrow, 7.5mg on Sun. Continue Lovenox. Next INR 2/15.  2/9/21 INR 1.71  Warfarin 7.5mg Sun and Thurs and 5mg AOD.  Cont Lovenox 135mg Q 12 hours.  Next INR 2/12/21.       Labs:  Results for orders placed or performed in visit on 02/16/21   Basic Metabolic Panel   Result Value Ref Range    Sodium 140 136 - 145 mmol/L    Potassium 4.1 3.5 - 5.0 mmol/L    Chloride 106 98 - 107 mmol/L    CO2 29 22 - 31 mmol/L    Anion Gap, Calculation 5 5 - 18 mmol/L    Glucose 95 70 - 125 mg/dL    Calcium 9.0 8.5 - 10.5 mg/dL    BUN 19 8 - 22 mg/dL    Creatinine 0.84 0.70 - 1.30 mg/dL    GFR MDRD Af Amer >60 >60 mL/min/1.73m2    GFR MDRD Non Af Amer >60 >60 mL/min/1.73m2         Assessment/Plan:    Left lower extremity s/p hematoma evacuation and I&D per non healing cellulitis s/p VAC and skin graft placement on 2/3/21: continue VAC, drsg changes as ordered, f/u with Dr Plascencia as ordered    Suspected seizure: prior to hospitalization, had R arm shaking and unresponsiveness, neuro started lamotrigine 25mg daily, will f/u and then increase to 50mg daily and have EEG    Acute right frontal infarct, silent, no neurological deficits: continue statin, anticoagulation, f/u with neurology in 8 wks    Pain control: increase tylenol to 1000mg three times a day and daily PRN and lyrica 100mg three times a day. Discontinued oxycodone    A-fib with MAV: continue coumadin 7.5mg Th/Sat and 5mg AOD, recheck INR on 2/24    Dementia: continue donepezil 20mg at HS and namenda 10mg two times a day    CLIF:  last Cr 1.11 with GFR >60 on 2/1/21    Normocytic anemia: last Hgb 12.6 on 2/6/21    Hypothyroidism: continue synthroid 150mcg daily and cytomel  25mg daily, last TSH 2.17    OAB: continue mirabegron 50mg daily    Constipation: continue senna S PRN    Depression: continue Effexor 300mg daily    Insomnia: continue melatonin 3mg at HS PRN, does not use    Disposition: lives with wife. SLUMS 22/30    MEDICAL EQUIPMENT NEEDS:  na    DISCHARGE PLAN/FACE TO FACE:  I certify that services are/were furnished while this patient was under the care of a physician and that a physician or an allowed non-physician practitioner (NPP), had a face-to-face encounter that meets the physician face-to-face encounter requirements. The encounter was in whole, or in part, related to the primary reason for home health. The patient is confined to his/her home and needs intermittent skilled nursing, physical therapy, speech-language pathology, or the continued need for occupational therapy. A plan of care has been established by a physician and is periodically reviewed by a physician.  Date of Face-to-Face Encounter: 2/18/21    I certify that, based on my findings, the following services are medically necessary home health services: [facility] HHA/RN and PT/OT to evaluate and treat    My clinical findings support the need for the above skilled services because: patient will be discharging to [facility]/memory care.      Patient to re-establish plan of care with their PCP within 7 days after leaving TCU.       The care plan has been reviewed and all orders signed. Changes to care plan, if any, as noted. Otherwise, continue care plan of care.  The total time spent with this patient was 38 minutes, with greater than 50% spent in counseling and coordination of care that included multiple issues per extensive surgical hx, f/u with neuro/ortho, pain control and therapy, which lasted 20 minutes.      Electronically signed by: Kem Matta NP

## 2021-06-22 ENCOUNTER — PATIENT OUTREACH (OUTPATIENT)
Dept: CARE COORDINATION | Facility: CLINIC | Age: 66
End: 2021-06-22

## 2021-06-22 ENCOUNTER — MEDICAL CORRESPONDENCE (OUTPATIENT)
Dept: HEALTH INFORMATION MANAGEMENT | Facility: CLINIC | Age: 66
End: 2021-06-22

## 2021-06-22 LAB — LAMOTRIGINE SERPL-MCNC: <0.9 UG/ML (ref 2.5–15)

## 2021-06-22 NOTE — LETTER
[facility] CARE COORDINATION  [address]  [facility] [address]  June 23, 2021    Todd S Aschoff  [address]  [place] [address]      Dear Nicko,    I am a clinic community health worker who works with Obdulio Ingram MD, MD at [facility]. I have been trying to reach you recently to introduce Clinic Care Coordination and to see if there was anything I could assist you with.  Below is a description of clinic care coordination and how I can further assist you.      The clinic care coordination team is made up of a registered nurse,  and community health worker who understand the health care system. The goal of clinic care coordination is to help you manage your health and improve access to the health care system in the most efficient manner. The team can assist you in meeting your health care goals by providing education, coordinating services, strengthening the communication among your providers and supporting you with any resource needs.    Please feel free to contact me at [phone] with any questions or concerns. We are focused on providing you with the highest-quality healthcare experience possible and that all starts with you.     Sincerely,     BIRD Grant

## 2021-06-22 NOTE — PROGRESS NOTES
Clinic Care Coordination Contact  [facility]/Voicemail       Clinical Data: Care Coordinator Outreach  Outreach attempted x 1.  Left message on patient's voicemail with call back information and requested return call.  Plan: Care Coordinator will try to reach patient again in 1-2 business days.

## 2021-06-23 ENCOUNTER — TELEPHONE (OUTPATIENT)
Dept: INTERNAL MEDICINE | Facility: CLINIC | Age: 66
End: 2021-06-23

## 2021-06-23 ENCOUNTER — NURSE TRIAGE (OUTPATIENT)
Dept: INTERNAL MEDICINE | Facility: CLINIC | Age: 66
End: 2021-06-23

## 2021-06-23 ENCOUNTER — ANTICOAGULATION THERAPY VISIT (OUTPATIENT)
Dept: INTERNAL MEDICINE | Facility: CLINIC | Age: 66
End: 2021-06-23

## 2021-06-23 ENCOUNTER — VIRTUAL VISIT (OUTPATIENT)
Dept: UROLOGY | Facility: CLINIC | Age: 66
End: 2021-06-23
Payer: COMMERCIAL

## 2021-06-23 VITALS — BODY MASS INDEX: 41.85 KG/M2 | HEIGHT: 72 IN | WEIGHT: 309 LBS

## 2021-06-23 DIAGNOSIS — I48.0 PAF (PAROXYSMAL ATRIAL FIBRILLATION) (H): ICD-10-CM

## 2021-06-23 DIAGNOSIS — R39.9 LOWER URINARY TRACT SYMPTOMS (LUTS): ICD-10-CM

## 2021-06-23 DIAGNOSIS — N32.81 OAB (OVERACTIVE BLADDER): ICD-10-CM

## 2021-06-23 DIAGNOSIS — Z95.2 AORTIC VALVE PROSTHESIS PRESENT: ICD-10-CM

## 2021-06-23 DIAGNOSIS — R97.20 ELEVATED PROSTATE SPECIFIC ANTIGEN (PSA): Primary | ICD-10-CM

## 2021-06-23 LAB — INR PPP: 3.1 (ref 0.9–1.1)

## 2021-06-23 PROCEDURE — 99214 OFFICE O/P EST MOD 30 MIN: CPT | Mod: 95 | Performed by: UROLOGY

## 2021-06-23 ASSESSMENT — MIFFLIN-ST. JEOR: SCORE: 2229.61

## 2021-06-23 ASSESSMENT — PAIN SCALES - GENERAL: PAINLEVEL: NO PAIN (0)

## 2021-06-23 NOTE — LETTER
6/23/2021       RE: Todd S Aschoff  [address]  [place] [address]     Dear Colleague,    Thank you for referring your patient, Todd S Aschoff, to the [facility]OLOGY [facility] at [facility]. Please see a copy of my visit note below.    [place]  CHIEF COMPLAINT   It was my pleasure to see Todd S Aschoff who is a 64 year old male for follow-up of Elevated PSA.      HPI   Todd S Aschoff is a very pleasant 65 year old male who presents with a history of Elevated PSA.  Patient had previously followed with Dr. Gonzales and was referred to Dr. Mock for discussion of possible InterStim or Botox for his overactive bladder symptoms.  However he was noted to have an elevated PSA and I discussed with him proceeding with a prostate biopsy.  He underwent a MR fusion biopsy on February 17 which was based on his prior MRI from 2017.  He returns today to discuss the results of his biopsy.  There is no evidence of prostate cancer.    TODAY 6/23/21:  On mirabegron (Myrbetriq) for the past 2 years   He is on Warfarin secondary to a heart valve   Follow-up today he is very interested in a REZUM  He does not recall discussion of an InterStim    PHYSICAL EXAM  Patient is a 65 year old  male   Vitals: Height 1.829 m (6'), weight 140.2 kg (309 lb).  Body mass index is 41.91 kg/m .  General Appearance Adult:   Alert, no acute distress, oriented  HENT: throat/mouth:normal, good dentition  Lungs: no respiratory distress, or pursed lip breathing  Heart: No obvious jugular venous distension present  Abdomen: obesely - distended  Musculoskeltal: extremities normal, no peripheral edema  Skin: no suspicious lesions or rashes  Neuro: Alert, oriented, speech and mentation normal  Psych: affect and mood normal  Gait: Normal     Component PSA PSA Diag Urologic Phys   Latest Ref Rng & Units 0 - 4 ug/L 0.00 - 4.00 ng/mL   4/20/2004 0.56    5/12/2006 1.11    9/5/2007 1.82    10/29/2008  2.46    10/23/2009 1.44    12/27/2010 4.68 (H)    1/6/2012 4.13 (H)    10/31/2012 4.08 (H)    12/17/2013 3.70    12/24/2014 3.37    1/8/2016 4.57 (H)    1/25/2017  13.80 (H)   2/22/2017 5.84 (H)    11/1/2017  3.80   6/12/2018 3.84    10/29/2019 6.28 (H)    1/14/2020 6.93 (H)      PATHOLOGY:  A.-M. - no evidence of disease     IMAGING:  All pertinent imaging reviewed:    All imaging studies reviewed by me.  I personally reviewed these imaging films.  A formal report from radiology will follow.  FINDINGS:  Prostate gland size: 35 x 51 x 50  Volume: 46.4 g     Peripheral zone: In the left posterior peripheral zone in the mid  gland (series 7 image 31), there is a 4 x 4 by 4 mm focus of T2  hypointensity with moderately decreased ADC signal, no significant  increased signal on high B value diffusion weighted imaging, and no  early enhancement (best visualized on series 9 image 15, series 6  image 19, series 5 image 25, series 3 image 13).     PI-RADS:  Peripheral zone T2: 3  Diffusion-weighted image: 3    Contrast-enhanced images: Negative       Overall assessment: 3     Transitional zone: There are BPH type changes without suspicious  lesion.     PI-RADS:  Transition zone T2: 2  Diffusion-weighted image: 2  Contrast-enhanced images: Negative       Overall assessment: 2     Remainder of the pelvis:  No lymphadenopathy. The visualized  vasculature is patent. Partially decompressed bladder, otherwise  unremarkable. Partially evaluated bilateral hip prosthesis,  surrounding metal artifact limits evaluation. No suspicious focal  lesions in the bones or the soft tissue. The visualized bowel is  unremarkable. Small bilateral fat-containing inguinal hernias.  Hydrocele.                                                     IMPRESSION:   1. Based on the most suspicious abnormality, this exam is  characterized as PIRADS 3 - Intermediate probability.  The presence of  clinically significant cancer is equivocal. The most suspicious  focus  is in the left posterior lateral peripheral zone at the 5:00 position  measuring 4 mm. There is no evidence for extraprostatic extension.  2. No evidence of extraprostatic malignancy. No suspicious adenopathy  or evidence of pelvic metastases.    ASSESSMENT and PLAN  65-year-old man with history of elevated PSA and overactive bladder symptoms    Elevated PSA  -He is now status post an MR fusion biopsy with no evidence of prostate cancer  -We will plan for PSA recheck when he comes in for his cystoscopy    LUTS and overactive bladder symptoms  -He did not ever have follow-up with Dr. Mock to discuss an InterStim  -He is very interested in possible bladder outlet procedure such as REZUM  -We discussed first-line would be an office cystoscopy followed by possible urodynamics  -Follow-up for cystoscopy in the very near future    Chart documentation with Dragon Voice recognition Software. Although reviewed after completion, some words and grammatical errors may remain.       Time spent: 20 minutes spent on the date of the encounter doing chart review, history and exam, documentation and further activities as noted above.     Jamal Andersen MD   Urology  [facility]  Clinic Phone [phone]        Nicko is a 65 year old who is being evaluated via a billable video visit.      How would you like to obtain your AVS? MyChart  If the video visit is dropped, the invitation should be resent by: Text to cell phone: [phone]  Will anyone else be joining your video visit? No        Video-Visit Details    Type of service:  Video Visit    Video Start Time: 3:10 PM    Video End Time:3:18 PM    Originating Location (pt. Location): Home    Distant Location (provider location):  [facility] UROLOGY CLINIC [place]     Platform used for Video Visit: [employer]

## 2021-06-23 NOTE — PROGRESS NOTES
Huddled with pharmacist:  - DVM on patient phone left to go to ED with s/s   - DVM left on Anjelica RN's phone that patient was advised to seek ED    ANTICOAGULATION MANAGEMENT     Todd S Aschoff 65 year old male is on warfarin with therapeutic INR result. (Goal INR 2.5-3.5)    Recent labs: (last 7 days)     06/23/21   INR 3.1*       ASSESSMENT     Source(s): Home Care/Facility Nurse       Warfarin doses taken: Warfarin taken as instructed    Diet: No new diet changes identified    New illness, injury, or hospitalization: No    Medication/supplement changes: None noted    Signs or symptoms of bleeding or clotting: No    Previous INR: Subtherapeutic    Additional findings: Head ache 10/10, starts at back of head then comes to the front - woke up with headache, Damp shirt - micro bleed - educated and encouraged to go back to ED for s/s of possible bleed      PLAN     Recommended plan for no diet, medication or health factor changes affecting INR     Dosing Instructions: Continue your current warfarin dose with next INR in 2 days       Summary  As of 6/23/2021    Full warfarin instructions:  5 mg every Mon, Wed, Fri; 7.5 mg all other days   Next INR check:  6/25/2021             Telephone call with home care nurse Anjelica whom agrees to plan and repeated back plan correctly    Orders given to  Homecare nurse/facility to recheck    Education provided: When to seek medical attention/emergency care and Contact [phone]  with any changes, questions or concerns.     Plan made per [facility] anticoagulation protocol & pharmacist     Linda Tang, RN  Anticoagulation Clinic  6/23/2021    _______________________________________________________________________     Anticoagulation Episode Summary     Current INR goal:  2.5-3.5   TTR:  37.1 % (10.1 mo)   Target end date:  Indefinite   Send INR reminders to:  AIDA MCWILLIAMS    Indications    Mechanical AV Replacement 1992 -- on Warfarin  [Z95.2]  PAF (paroxysmal atrial fibrillation) (H)  [I48.0]  Long term current use of anticoagulant therapy (Resolved) [Z79.01]  Chronic atrial fibrillation (H) (Resolved) [I48.20]           Comments:  5mg tabs / CALENDAR / needs bridging. may leave DVM or speak with Paul per signed consent // Home care         Anticoagulation Care Providers     Provider Role Specialty Phone number    Obdulio Ingram MD Referring Internal Medicine [phone]

## 2021-06-23 NOTE — PROGRESS NOTES
"Spoke with homecare nurse Anjelica. Anjelica was able to talk with Nicko and spouse and advise ER visit.     Noted at this time 2:21 PM patient has \"arrived\" for his virtual Urology appointment.     Patient and wife were advised to go to [facility] LIGIA Morales RN, BSN, PHN  Anticoagulation Nurse  [phone]     "

## 2021-06-23 NOTE — PROGRESS NOTES
Chart reviewed with NATI RN.  Due to concerns about micro hemorrhages with recent admission 06/18-06/20/2021. Rapid rise of INR and HA S/S, advise ED visit.    If deemed safe to continue warfarin, continue current warfarin dose (5,g today, 7.5mg Thursday) and recheck INR Friday.    Nolvia Andrew, PharmD BCACP  Anticoagulation Clinical Pharmacist

## 2021-06-23 NOTE — PROGRESS NOTES
Clinic Care Coordination Contact  [facility]/Voicemail       Clinical Data: Care Coordinator Outreach  Outreach attempted x 2.  Left message on patient's voicemail with call back information and requested return call.  Plan: Care Coordinator will send unable to contact letter with care coordinator contact information via Butter. Care Coordinator will do no further outreaches at this time.

## 2021-06-23 NOTE — PROGRESS NOTES
JENELLE  CHIEF COMPLAINT   It was my pleasure to see Todd S Aschoff who is a 64 year old male for follow-up of Elevated PSA.      HPI   Todd S Aschoff is a very pleasant 65 year old male who presents with a history of Elevated PSA.  Patient had previously followed with Dr. Gonzales and was referred to Dr. Mock for discussion of possible InterStim or Botox for his overactive bladder symptoms.  However he was noted to have an elevated PSA and I discussed with him proceeding with a prostate biopsy.  He underwent a MR fusion biopsy on February 17 which was based on his prior MRI from 2017.  He returns today to discuss the results of his biopsy.  There is no evidence of prostate cancer.    TODAY 6/23/21:  On mirabegron (Myrbetriq) for the past 2 years   He is on Warfarin secondary to a heart valve   Follow-up today he is very interested in a REZUM  He does not recall discussion of an InterStim    PHYSICAL EXAM  Patient is a 65 year old  male   Vitals: Height 1.829 m (6'), weight 140.2 kg (309 lb).  Body mass index is 41.91 kg/m .  General Appearance Adult:   Alert, no acute distress, oriented  HENT: throat/mouth:normal, good dentition  Lungs: no respiratory distress, or pursed lip breathing  Heart: No obvious jugular venous distension present  Abdomen: obesely - distended  Musculoskeltal: extremities normal, no peripheral edema  Skin: no suspicious lesions or rashes  Neuro: Alert, oriented, speech and mentation normal  Psych: affect and mood normal  Gait: Normal     Component PSA PSA Diag Urologic Phys   Latest Ref Rng & Units 0 - 4 ug/L 0.00 - 4.00 ng/mL   4/20/2004 0.56    5/12/2006 1.11    9/5/2007 1.82    10/29/2008 2.46    10/23/2009 1.44    12/27/2010 4.68 (H)    1/6/2012 4.13 (H)    10/31/2012 4.08 (H)    12/17/2013 3.70    12/24/2014 3.37    1/8/2016 4.57 (H)    1/25/2017  13.80 (H)   2/22/2017 5.84 (H)    11/1/2017  3.80   6/12/2018 3.84    10/29/2019 6.28 (H)    1/14/2020 6.93 (H)      PATHOLOGY:  LYDIA akbar  evidence of disease     IMAGING:  All pertinent imaging reviewed:    All imaging studies reviewed by me.  I personally reviewed these imaging films.  A formal report from radiology will follow.  FINDINGS:  Prostate gland size: 35 x 51 x 50  Volume: 46.4 g     Peripheral zone: In the left posterior peripheral zone in the mid  gland (series 7 image 31), there is a 4 x 4 by 4 mm focus of T2  hypointensity with moderately decreased ADC signal, no significant  increased signal on high B value diffusion weighted imaging, and no  early enhancement (best visualized on series 9 image 15, series 6  image 19, series 5 image 25, series 3 image 13).     PI-RADS:  Peripheral zone T2: 3  Diffusion-weighted image: 3    Contrast-enhanced images: Negative       Overall assessment: 3     Transitional zone: There are BPH type changes without suspicious  lesion.     PI-RADS:  Transition zone T2: 2  Diffusion-weighted image: 2  Contrast-enhanced images: Negative       Overall assessment: 2     Remainder of the pelvis:  No lymphadenopathy. The visualized  vasculature is patent. Partially decompressed bladder, otherwise  unremarkable. Partially evaluated bilateral hip prosthesis,  surrounding metal artifact limits evaluation. No suspicious focal  lesions in the bones or the soft tissue. The visualized bowel is  unremarkable. Small bilateral fat-containing inguinal hernias.  Hydrocele.                                                     IMPRESSION:   1. Based on the most suspicious abnormality, this exam is  characterized as PIRADS 3 - Intermediate probability.  The presence of  clinically significant cancer is equivocal. The most suspicious focus  is in the left posterior lateral peripheral zone at the 5:00 position  measuring 4 mm. There is no evidence for extraprostatic extension.  2. No evidence of extraprostatic malignancy. No suspicious adenopathy  or evidence of pelvic metastases.    ASSESSMENT and PLAN  65-year-old man with history  of elevated PSA and overactive bladder symptoms    Elevated PSA  -He is now status post an MR fusion biopsy with no evidence of prostate cancer  -We will plan for PSA recheck when he comes in for his cystoscopy    LUTS and overactive bladder symptoms  -He did not ever have follow-up with Dr. Mock to discuss an InterStim  -He is very interested in possible bladder outlet procedure such as REZUM  -We discussed first-line would be an office cystoscopy followed by possible urodynamics  -Follow-up for cystoscopy in the very near future    Chart documentation with Dragon Voice recognition Software. Although reviewed after completion, some words and grammatical errors may remain.       Time spent: 20 minutes spent on the date of the encounter doing chart review, history and exam, documentation and further activities as noted above.     Jamal Andersen MD   Urology  [facility]  Clinic Phone [phone]        Nicko is a 65 year old who is being evaluated via a billable video visit.      How would you like to obtain your AVS? MyChart  If the video visit is dropped, the invitation should be resent by: Text to cell phone: [phone]  Will anyone else be joining your video visit? No        Video-Visit Details    Type of service:  Video Visit    Video Start Time: 3:10 PM    Video End Time:3:18 PM    Originating Location (pt. Location): Home    Distant Location (provider location):  [facility] [place]     Platform used for Video Visit: Filmzu

## 2021-06-23 NOTE — TELEPHONE ENCOUNTER
Anjelica, Home care nurse calls regarding pts Symptoms. She states pt has severe headache. 10/10 pain level. Woke up with it this AM. Started at back of head and radiates to the front. He is sensitive to light. No numbness. Has a history of strokes.   Took tylenol didn't help.   BP is 162/90.   INR was OK. She is going to talk with him and wife regarding going to ED.       Reason for Disposition    SEVERE headache, states 'worst headache' of life    Additional Information    Negative: Difficult to awaken or acting confused (e.g., disoriented, slurred speech)    Negative: Weakness of the face, arm or leg on one side of the body and new onset    Negative: Numbness of the face, arm or leg on one side of the body and new onset    Negative: Loss of speech or garbled speech and new onset    Negative: Passed out (i.e., fainted, collapsed and was not responding)    Negative: Sounds like a life-threatening emergency to the triager    Negative: Followed a head injury within last 3 days    Negative: Traumatic Brain Injury (TBI) is suspected    Negative: Sinus pain of forehead and yellow or green nasal discharge    Negative: Pregnant    Negative: Unable to walk without falling    Negative: Stiff neck (can't touch chin to chest)    Negative: Possibility of carbon monoxide exposure    Protocols used: HEADACHE-A-[place]

## 2021-06-23 NOTE — PROGRESS NOTES
Per routing comment from RN:    Routing as FYI - INR RN advising patient to go to ED due to s/s of possible bleed. (headache 10/10, night sweats) recent admission for micro bleed on 6/18

## 2021-06-24 ENCOUNTER — TELEPHONE (OUTPATIENT)
Dept: UROLOGY | Facility: CLINIC | Age: 66
End: 2021-06-24

## 2021-06-24 NOTE — TELEPHONE ENCOUNTER
----- Message from Barbara Hong sent at 6/23/2021  3:52 PM CDT -----  Follow up for PSA, cystoscopy and AUASS and PVR in the near future    [facility]

## 2021-06-25 ENCOUNTER — DOCUMENTATION ONLY (OUTPATIENT)
Dept: INTERNAL MEDICINE | Facility: CLINIC | Age: 66
End: 2021-06-25

## 2021-06-25 ENCOUNTER — ANTICOAGULATION THERAPY VISIT (OUTPATIENT)
Dept: INTERNAL MEDICINE | Facility: CLINIC | Age: 66
End: 2021-06-25

## 2021-06-25 ENCOUNTER — TELEPHONE (OUTPATIENT)
Dept: INTERNAL MEDICINE | Facility: CLINIC | Age: 66
End: 2021-06-25

## 2021-06-25 DIAGNOSIS — Z95.2 AORTIC VALVE PROSTHESIS PRESENT: Primary | ICD-10-CM

## 2021-06-25 DIAGNOSIS — I48.0 PAF (PAROXYSMAL ATRIAL FIBRILLATION) (H): ICD-10-CM

## 2021-06-25 DIAGNOSIS — Z95.2 AORTIC VALVE PROSTHESIS PRESENT: ICD-10-CM

## 2021-06-25 LAB — INR PPP: 5 (ref 0.9–1.1)

## 2021-06-25 NOTE — PROGRESS NOTES
ANTICOAGULATION MANAGEMENT      Todd S Aschoff due for annual renewal of referral to anticoagulation monitoring. Order pended for your review and signature.      ANTICOAGULATION SUMMARY      Warfarin indication(s)     Atrial fibrillation  Heart Valve Replacement    Heart valve present?  Mechanical  AVR-Bileaflet       Current goal range   INR: 2.5-3.5     Goal appropriate for indication? Yes, INR 2.5-3.5 appropriate for hx  Mechanical MVR, Mechanical AVR with risk factors or older generation (Laura-Shiley (Tilting disc), Rizo-Farmer (Caged Ball) or Monoleaflet valve)     Current duration of therapy Indefinite/long term therapy   Time in Therapeutic Range (TTR)  (Goal > 60%) 37%       Office visit with referring provider's group within last year yes on 05/26/2021       Oscar Briscoe RN

## 2021-06-25 NOTE — PROGRESS NOTES
ANTICOAGULATION MANAGEMENT     Todd S Aschoff 65 year old male is on warfarin with supratherapeutic INR result. (Goal INR 2.5-3.5)    Recent labs: (last 7 days)     06/25/21   INR 5.0*         ASSESSMENT     Source(s): Home Care/Facility Nurse       Warfarin doses taken: Warfarin taken as instructed and recent boost on 06/21 likely plaing into elevated result today.    Diet: No new diet changes identified    New illness, injury, or hospitalization: No. Headache from Wednesday has resolved.    Medication/supplement changes: None noted    Signs or symptoms of bleeding or clotting: No. Denies headache, stroke/hemmorhage sx.    Previous INR: Therapeutic last visit; previously outside of goal range    Additional findings: None     PLAN     Recommended plan for temporary change(s) affecting INR     Dosing Instructions: Hold dose then continue your current warfarin dose with next INR in 3 days       Summary  As of 6/25/2021    Full warfarin instructions:  6/25: Hold; Otherwise 5 mg every Mon, Wed, Fri; 7.5 mg all other days   Next INR check:  6/28/2021             Telephone call with Anjelica whom verbalizes understanding and agrees to plan    Orders given to  Homecare nurse/facility to recheck    Education provided: Target INR goal and significance of current INR result. Advised Nicko to go to the ED immediately if he develops a severe headache, changes to vision, or weakness.    Plan made per [facility] anticoagulation protocol    Oscar Briscoe RN  Anticoagulation Clinic  6/25/2021    _______________________________________________________________________     Anticoagulation Episode Summary     Current INR goal:  2.5-3.5   TTR:  36.6 % (10.1 mo)   Target end date:  Indefinite   Send INR reminders to:  AIDA MCWILLIAMS    Indications    Mechanical AV Replacement 1992 -- on Warfarin  [Z95.2]  PAF (paroxysmal atrial fibrillation) (H) [I48.0]  Long term current use of anticoagulant therapy (Resolved) [Z79.01]  Chronic atrial fibrillation  (H) (Resolved) [I48.20]           Comments:  5mg tabs / CALENDAR / needs bridging. may leave DVM or speak with Paul, per signed consent // Home care         Anticoagulation Care Providers     Provider Role Specialty Phone number    Obdulio Ingram MD Referring Internal Medicine [phone]

## 2021-06-28 ENCOUNTER — TELEPHONE (OUTPATIENT)
Dept: INTERNAL MEDICINE | Facility: CLINIC | Age: 66
End: 2021-06-28

## 2021-06-28 ENCOUNTER — ANTICOAGULATION THERAPY VISIT (OUTPATIENT)
Dept: INTERNAL MEDICINE | Facility: CLINIC | Age: 66
End: 2021-06-28

## 2021-06-28 DIAGNOSIS — I48.0 PAF (PAROXYSMAL ATRIAL FIBRILLATION) (H): ICD-10-CM

## 2021-06-28 DIAGNOSIS — Z95.2 AORTIC VALVE PROSTHESIS PRESENT: ICD-10-CM

## 2021-06-28 LAB — INR PPP: 2.9 (ref 0.9–1.1)

## 2021-06-28 NOTE — TELEPHONE ENCOUNTER
[facility]  INR results 6/25/21  Dr Ingram's yellow folder  Fax

## 2021-06-28 NOTE — PROGRESS NOTES
ANTICOAGULATION MANAGEMENT     Todd S Aschoff 65 year old male is on warfarin with therapeutic INR result. (Goal INR 2.5-3.5)    Recent labs: (last 7 days)     06/28/21   INR 2.9*       ASSESSMENT     Source(s): Home Care/Facility Nurse       Warfarin doses taken: Warfarin taken as instructed    Diet: No new diet changes identified    New illness, injury, or hospitalization: No    Medication/supplement changes: None noted    Signs or symptoms of bleeding or clotting: No    Previous INR: Supratherapeutic    Additional findings: None     PLAN     Recommended plan for no diet, medication or health factor changes affecting INR     Dosing Instructions: Continue your current warfarin dose with next INR in 5 days       Summary  As of 6/28/2021    Full warfarin instructions:  5 mg every Mon, Wed, Fri; 7.5 mg all other days   Next INR check:  7/2/2021             Detailed voice message left for home care nurse Anjelica with dosing instructions and follow up date.     Orders given to  Homecare nurse/facility to recheck    Education provided: Please call back if any changes to your diet, medications or how you've been taking warfarin and Contact [phone]  with any changes, questions or concerns.     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 may leave DVM or speak with Paul, per signed consent // Home care         Anticoagulation Care  Providers     Provider Role Specialty Phone number    Obdulio Ingram MD Referring Internal Medicine [phone]          Linda Morales RN, BSN, PHN  Anticoagulation Nurse  [phone]

## 2021-06-30 ENCOUNTER — TELEPHONE (OUTPATIENT)
Dept: INTERNAL MEDICINE | Facility: CLINIC | Age: 66
End: 2021-06-30

## 2021-06-30 DIAGNOSIS — F33.42 MAJOR DEPRESSIVE DISORDER, RECURRENT EPISODE, IN FULL REMISSION (H): ICD-10-CM

## 2021-06-30 RX ORDER — LIOTHYRONINE SODIUM 25 UG/1
25 TABLET ORAL DAILY
Qty: 30 TABLET | Refills: 3 | Status: SHIPPED | OUTPATIENT
Start: 2021-06-30 | End: 2021-10-26

## 2021-06-30 RX ORDER — FOLIC ACID 1 MG/1
5 TABLET ORAL DAILY
Qty: 150 TABLET | Refills: 3 | Status: SHIPPED | OUTPATIENT
Start: 2021-06-30 | End: 2021-10-26

## 2021-07-02 ENCOUNTER — ANTICOAGULATION THERAPY VISIT (OUTPATIENT)
Dept: INTERNAL MEDICINE | Facility: CLINIC | Age: 66
End: 2021-07-02

## 2021-07-02 DIAGNOSIS — Z95.2 AORTIC VALVE PROSTHESIS PRESENT: ICD-10-CM

## 2021-07-02 DIAGNOSIS — I48.0 PAF (PAROXYSMAL ATRIAL FIBRILLATION) (H): ICD-10-CM

## 2021-07-02 LAB — INR PPP: 3.6 (ref 0.9–1.1)

## 2021-07-02 NOTE — PROGRESS NOTES
ANTICOAGULATION MANAGEMENT     Todd S Aschoff 65 year old male is on warfarin with supratherapeutic INR result. (Goal INR 2.5-3.5)    Recent labs: (last 7 days)     07/02/21   INR 3.6*       ASSESSMENT     Source(s): Chart Review and Home Care/Facility Nurse       Warfarin doses taken: Warfarin taken as instructed    Diet: No new diet changes identified    New illness, injury, or hospitalization: No    Medication/supplement changes: None noted    Signs or symptoms of bleeding or clotting: No    Previous INR: Therapeutic last visit; previously outside of goal range    Additional findings: None     PLAN     Recommended plan for no diet, medication or health factor changes affecting INR     Dosing Instructions:  Decrease your warfarin dose (5.6% change) with next INR in 5 days       Summary  As of 7/2/2021    Full warfarin instructions:  5 mg every Mon, Wed, Fri; 7.5 mg all other days   Next INR check:  7/6/2021             Telephone call with home care nurse Anjelica whom agrees to plan and repeated back plan correctly    Orders given to  Homecare nurse/facility to recheck    Education provided: Target INR goal and significance of current INR result    Plan made per [facility] anticoagulation protocol    Symone Bui RN  Anticoagulation Clinic  7/2/2021    _______________________________________________________________________     Anticoagulation Episode Summary     Current INR goal:  2.5-3.5   TTR:  36.2 % (10.1 mo)   Target end date:  Indefinite   Send INR reminders to:  AIDA MCWILLIAMS    Indications    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

## 2021-07-02 NOTE — PROGRESS NOTES
VM from home care nurse Anjelica with INR result of 3.6 07/02/21.     Please call Anjelica back with dosing [phone]

## 2021-07-06 ENCOUNTER — ANTICOAGULATION THERAPY VISIT (OUTPATIENT)
Dept: INTERNAL MEDICINE | Facility: CLINIC | Age: 66
End: 2021-07-06

## 2021-07-06 ENCOUNTER — TELEPHONE (OUTPATIENT)
Dept: INTERNAL MEDICINE | Facility: CLINIC | Age: 66
End: 2021-07-06

## 2021-07-06 DIAGNOSIS — I48.0 PAF (PAROXYSMAL ATRIAL FIBRILLATION) (H): ICD-10-CM

## 2021-07-06 DIAGNOSIS — Z95.2 AORTIC VALVE PROSTHESIS PRESENT: ICD-10-CM

## 2021-07-06 LAB — INR PPP: 3.1 (ref 0.9–1.1)

## 2021-07-06 NOTE — PROGRESS NOTES
ANTICOAGULATION MANAGEMENT     Todd S Aschoff 65 year old male is on warfarin with therapeutic INR result. (Goal INR 2.5-3.5)    Recent labs: (last 7 days)     07/06/21   INR 3.1*       ASSESSMENT     Source(s): Home Care/Facility Nurse       Warfarin doses taken: Less warfarin taken than planned which may be affecting INR    Diet: No new diet changes identified    New illness, injury, or hospitalization: No    Medication/supplement changes: None noted    Signs or symptoms of bleeding or clotting: Pt states he only took 5mg on 07/04 because he cut his finger and it took a while for it to stop bleeding.    Previous INR: Supratherapeutic    Additional findings: Since pt took 40mg in the past week and he is in range today, will change maintenance dose to 40mg/week.     PLAN     Recommended plan for no diet, medication or health factor changes affecting INR     Dosing Instructions: Continue your current warfarin dose with next INR in 3 days       Summary  As of 7/6/2021    Full warfarin instructions:  7.5 mg every Tue, Fri; 5 mg all other days   Next INR check:  7/9/2021             Telephone call with home care nurse Anjelica who verbalizes understanding and agrees to plan    Orders given to  Homecare nurse/facility to recheck    Education provided: Target INR goal and significance of current INR result    Plan made per [facility] anticoagulation protocol    Oscar Briscoe RN  Anticoagulation Clinic  7/6/2021    _______________________________________________________________________     Anticoagulation Episode Summary     Current INR goal:  2.5-3.5   TTR:  37.3 % (10.1 mo)   Target end date:  Indefinite   Send INR reminders to:  ADIA MCWILLIAMS    Indications    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

## 2021-07-07 ENCOUNTER — TELEPHONE (OUTPATIENT)
Dept: INTERNAL MEDICINE | Facility: CLINIC | Age: 66
End: 2021-07-07

## 2021-07-09 ENCOUNTER — ANTICOAGULATION THERAPY VISIT (OUTPATIENT)
Dept: INTERNAL MEDICINE | Facility: CLINIC | Age: 66
End: 2021-07-09

## 2021-07-09 ENCOUNTER — TELEPHONE (OUTPATIENT)
Dept: INTERNAL MEDICINE | Facility: CLINIC | Age: 66
End: 2021-07-09

## 2021-07-09 DIAGNOSIS — I48.0 PAF (PAROXYSMAL ATRIAL FIBRILLATION) (H): ICD-10-CM

## 2021-07-09 DIAGNOSIS — Z95.2 AORTIC VALVE PROSTHESIS PRESENT: ICD-10-CM

## 2021-07-09 LAB — INR PPP: 2.8 (ref 0.9–1.1)

## 2021-07-09 NOTE — PROGRESS NOTES
ANTICOAGULATION MANAGEMENT     Todd S Aschoff 65 year old male is on warfarin with therapeutic INR result. (Goal INR 2.5-3.5)    Recent labs: (last 7 days)     07/09/21   INR 2.8*       ASSESSMENT     Source(s): Home Care/Facility Nurse       Warfarin doses taken: Warfarin taken as instructed    Diet: No new diet changes identified    New illness, injury, or hospitalization: No    Medication/supplement changes: None noted    Signs or symptoms of bleeding or clotting: No    Previous INR: Therapeutic last visit; previously outside of goal range    Additional findings: None     PLAN     Recommended plan for no diet, medication or health factor changes affecting INR     Dosing Instructions: Continue your current warfarin dose with next INR in 1 week       Summary  As of 7/9/2021    Full warfarin instructions:  7.5 mg every Tue, Fri; 5 mg all other days   Next INR check:  7/16/2021             Telephone call with home care nurse Anjelica who verbalizes understanding and agrees to plan    Orders given to  Homecare nurse/facility to recheck    Education provided: Target INR goal and significance of current INR result    Plan made per [facility] anticoagulation protocol    Oscar Briscoe RN  Anticoagulation Clinic  7/9/2021    _______________________________________________________________________     Anticoagulation Episode Summary     Current INR goal:  2.5-3.5   TTR:  38.2 % (10.1 mo)   Target end date:  Indefinite   Send INR reminders to:  AIDA MCWILLIAMS    Indications    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

## 2021-07-09 NOTE — TELEPHONE ENCOUNTER
Anjelica at [facility] ([phone]) calling for Skilled Nursing 1xwk for 9 wks with 3 PRN's.  OK to leave a detailed message.

## 2021-07-10 ENCOUNTER — HEALTH MAINTENANCE LETTER (OUTPATIENT)
Age: 66
End: 2021-07-10

## 2021-07-13 ENCOUNTER — TELEPHONE (OUTPATIENT)
Dept: INTERNAL MEDICINE | Facility: CLINIC | Age: 66
End: 2021-07-13

## 2021-07-16 ENCOUNTER — ANTICOAGULATION THERAPY VISIT (OUTPATIENT)
Dept: INTERNAL MEDICINE | Facility: CLINIC | Age: 66
End: 2021-07-16

## 2021-07-16 ENCOUNTER — TELEPHONE (OUTPATIENT)
Dept: INTERNAL MEDICINE | Facility: CLINIC | Age: 66
End: 2021-07-16

## 2021-07-16 DIAGNOSIS — Z95.2 AORTIC VALVE PROSTHESIS PRESENT: Primary | ICD-10-CM

## 2021-07-16 DIAGNOSIS — I48.0 PAF (PAROXYSMAL ATRIAL FIBRILLATION) (H): ICD-10-CM

## 2021-07-16 LAB — INR PPP: 3

## 2021-07-16 NOTE — PROGRESS NOTES
ANTICOAGULATION MANAGEMENT     Todd S Aschoff 65 year old male is on warfarin with therapeutic INR result. (Goal INR 2.5-3.5)    Recent labs: (last 7 days)     07/16/21  0000   INR 3.0       ASSESSMENT     Source(s): Home Care/Facility Nurse       Warfarin doses taken: Warfarin taken as instructed    Diet: No new diet changes identified    New illness, injury, or hospitalization: No    Medication/supplement changes: None noted    Signs or symptoms of bleeding or clotting: No    Previous INR: Therapeutic last 2(+) visits    Additional findings: None     PLAN     Recommended plan for no diet, medication or health factor changes affecting INR     Dosing Instructions: Continue your current warfarin dose with next INR in 2 weeks       Summary  As of 7/16/2021    Full warfarin instructions:  7.5 mg every Tue, Fri; 5 mg all other days   Next INR check:  7/30/2021             Telephone call with home care nurse Anjelica who verbalizes understanding and agrees to plan    Orders given to  Homecare nurse/facility to recheck    Education provided: Target INR goal and significance of current INR result    Plan made per [facility] anticoagulation protocol    Oscar Briscoe RN  Anticoagulation Clinic  7/16/2021    _______________________________________________________________________     Anticoagulation Episode Summary     Current INR goal:  2.5-3.5   TTR:  40.7 % (10.5 mo)   Target end date:  Indefinite   Send INR reminders to:  AIDA MCWILLIAMS    Indications    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

## 2021-07-16 NOTE — TELEPHONE ENCOUNTER
[facility]  Certification and POC from 7/11/21 to 9/8/21  Dr. Ingram's yellow folder   Fax

## 2021-07-22 ENCOUNTER — TELEPHONE (OUTPATIENT)
Dept: INTERNAL MEDICINE | Facility: CLINIC | Age: 66
End: 2021-07-22

## 2021-07-22 DIAGNOSIS — R97.20 ELEVATED PROSTATE SPECIFIC ANTIGEN (PSA): Primary | ICD-10-CM

## 2021-07-26 ENCOUNTER — PRE VISIT (OUTPATIENT)
Dept: UROLOGY | Facility: CLINIC | Age: 66
End: 2021-07-26

## 2021-07-26 ENCOUNTER — OFFICE VISIT (OUTPATIENT)
Dept: UROLOGY | Facility: CLINIC | Age: 66
End: 2021-07-26
Payer: COMMERCIAL

## 2021-07-26 VITALS
BODY MASS INDEX: 40.82 KG/M2 | DIASTOLIC BLOOD PRESSURE: 60 MMHG | OXYGEN SATURATION: 97 % | HEIGHT: 73 IN | HEART RATE: 76 BPM | WEIGHT: 308 LBS | SYSTOLIC BLOOD PRESSURE: 130 MMHG

## 2021-07-26 DIAGNOSIS — R35.0 BENIGN PROSTATIC HYPERPLASIA WITH URINARY FREQUENCY: Primary | ICD-10-CM

## 2021-07-26 DIAGNOSIS — R97.20 ELEVATED PROSTATE SPECIFIC ANTIGEN (PSA): ICD-10-CM

## 2021-07-26 DIAGNOSIS — N32.81 OAB (OVERACTIVE BLADDER): ICD-10-CM

## 2021-07-26 DIAGNOSIS — N40.1 BENIGN PROSTATIC HYPERPLASIA WITH URINARY FREQUENCY: Primary | ICD-10-CM

## 2021-07-26 LAB
ALBUMIN UR-MCNC: NEGATIVE MG/DL
APPEARANCE UR: CLEAR
BILIRUB UR QL STRIP: NEGATIVE
COLOR UR AUTO: YELLOW
GLUCOSE UR STRIP-MCNC: NEGATIVE MG/DL
HGB UR QL STRIP: ABNORMAL
KETONES UR STRIP-MCNC: NEGATIVE MG/DL
LEUKOCYTE ESTERASE UR QL STRIP: NEGATIVE
NITRATE UR QL: NEGATIVE
PH UR STRIP: 6 [PH] (ref 5–7)
PSA SERPL-MCNC: 4.8 UG/L (ref 0–4)
RESIDUAL VOLUME (RV) (EXTERNAL): 34
SP GR UR STRIP: >=1.03 (ref 1–1.03)
UROBILINOGEN UR STRIP-ACNC: 0.2 E.U./DL

## 2021-07-26 PROCEDURE — 81003 URINALYSIS AUTO W/O SCOPE: CPT | Mod: QW | Performed by: UROLOGY

## 2021-07-26 PROCEDURE — 36415 COLL VENOUS BLD VENIPUNCTURE: CPT | Performed by: UROLOGY

## 2021-07-26 PROCEDURE — 99214 OFFICE O/P EST MOD 30 MIN: CPT | Mod: 25 | Performed by: UROLOGY

## 2021-07-26 PROCEDURE — 51798 US URINE CAPACITY MEASURE: CPT | Performed by: UROLOGY

## 2021-07-26 PROCEDURE — 84153 ASSAY OF PSA TOTAL: CPT | Performed by: UROLOGY

## 2021-07-26 PROCEDURE — 52000 CYSTOURETHROSCOPY: CPT | Performed by: UROLOGY

## 2021-07-26 RX ORDER — LIDOCAINE HYDROCHLORIDE 20 MG/ML
JELLY TOPICAL ONCE
Status: DISCONTINUED | OUTPATIENT
Start: 2021-07-26 | End: 2021-07-26 | Stop reason: HOSPADM

## 2021-07-26 ASSESSMENT — MIFFLIN-ST. JEOR: SCORE: 2240.96

## 2021-07-26 ASSESSMENT — PAIN SCALES - GENERAL: PAINLEVEL: MODERATE PAIN (4)

## 2021-07-26 NOTE — PROGRESS NOTES
"[facility]  CHIEF COMPLAINT   It was my pleasure to see Todd S Aschoff who is a 64 year old male for follow-up of Elevated PSA.      HPI   Todd S Aschoff is a very pleasant 65 year old male who presents with a history of Elevated PSA.  Patient had previously followed with Dr. Gonzales and was referred to Dr. Mock for discussion of possible InterStim or Botox for his overactive bladder symptoms.  However he was noted to have an elevated PSA and I discussed with him proceeding with a prostate biopsy.  He underwent a MR fusion biopsy on February 17 which was based on his prior MRI from 2017.  He returns today to discuss the results of his biopsy.  There is no evidence of prostate cancer.    6/23/21:  On mirabegron (Myrbetriq) for the past 2 years   He is on Warfarin secondary to a heart valve   Follow-up today he is very interested in a REZUM  He does not recall discussion of an InterStim    TODAY 7/26/21:  Follow-up today for cystoscopy  He notes his most bothersome symptom is nocturia and proceed frequency  He does note that he has had 4 small strokes in the prior year    AUASS: 3-3-1-0-1-0-2/3 = 10/11  QOL = 5  PVR = 34cc    PHYSICAL EXAM  Patient is a 65 year old  male   Vitals: Blood pressure 130/60, pulse 76, height 1.854 m (6' 1\"), weight 139.7 kg (308 lb), SpO2 97 %.  Body mass index is 40.64 kg/m .  General Appearance Adult:   Alert, no acute distress, oriented  HENT: throat/mouth:normal, good dentition  Lungs: no respiratory distress, or pursed lip breathing  Heart: No obvious jugular venous distension present  Abdomen: soft, nontender, no organomegaly or masses  Musculoskeltal: extremities normal, no peripheral edema  Skin: no suspicious lesions or rashes  Neuro: Alert, oriented, speech and mentation normal  Psych: affect and mood normal  Gait: Normal  : penis, scrotum, testes normal      Component PSA PSA Diag Urologic Phys     Component PSA PSA Diag Urologic Phys   Latest Ref Rng & Units 0.00 - 4.00 ug/L " 0.00 - 4.00 ng/mL   4/20/2004 0.56    5/12/2006 1.11    9/5/2007 1.82    10/29/2008 2.46    10/23/2009 1.44    12/27/2010 4.68 (H)    1/6/2012 4.13 (H)    10/31/2012 4.08 (H)    12/17/2013 3.70    12/24/2014 3.37    1/8/2016 4.57 (H)    1/25/2017  13.80 (H)   2/22/2017 5.84 (H)    11/1/2017  3.80   6/12/2018 3.84    10/29/2019 6.28 (H)    1/14/2020 6.93 (H)    7/26/2021 4.80 (H)      PATHOLOGY:  A.-M. - no evidence of disease     IMAGING:  All pertinent imaging reviewed:    All imaging studies reviewed by me.  I personally reviewed these imaging films.  A formal report from radiology will follow.  FINDINGS:  Prostate gland size: 35 x 51 x 50  Volume: 46.4 g     Peripheral zone: In the left posterior peripheral zone in the mid  gland (series 7 image 31), there is a 4 x 4 by 4 mm focus of T2  hypointensity with moderately decreased ADC signal, no significant  increased signal on high B value diffusion weighted imaging, and no  early enhancement (best visualized on series 9 image 15, series 6  image 19, series 5 image 25, series 3 image 13).     PI-RADS:  Peripheral zone T2: 3  Diffusion-weighted image: 3    Contrast-enhanced images: Negative       Overall assessment: 3     Transitional zone: There are BPH type changes without suspicious  lesion.     PI-RADS:  Transition zone T2: 2  Diffusion-weighted image: 2  Contrast-enhanced images: Negative       Overall assessment: 2     Remainder of the pelvis:  No lymphadenopathy. The visualized  vasculature is patent. Partially decompressed bladder, otherwise  unremarkable. Partially evaluated bilateral hip prosthesis,  surrounding metal artifact limits evaluation. No suspicious focal  lesions in the bones or the soft tissue. The visualized bowel is  unremarkable. Small bilateral fat-containing inguinal hernias.  Hydrocele.                                                     IMPRESSION:   1. Based on the most suspicious abnormality, this exam is  characterized as PIRADS 3 -  Intermediate probability.  The presence of  clinically significant cancer is equivocal. The most suspicious focus  is in the left posterior lateral peripheral zone at the 5:00 position  measuring 4 mm. There is no evidence for extraprostatic extension.  2. No evidence of extraprostatic malignancy. No suspicious adenopathy  or evidence of pelvic metastases.    ASSESSMENT and PLAN  65-year-old man with history of elevated PSA and overactive bladder symptoms    Elevated PSA  -He is now status post an MR fusion biopsy with no evidence of prostate cancer  -PSA today stable and decreased to 4.80  -Plan for continued annual monitoring    LUTS and overactive bladder symptoms  -Cystoscopy today with some evidence of possible outlet obstruction, however also evidence of a overactive bladder.  His bladder only partially distended before having a spontaneous contraction which she seemed unaware of  -We discussed the need for further work-up with urodynamics prior to any bladder outlet procedure  -Follow-up with me with a virtual visit after his urodynamics    Chart documentation with Dragon Voice recognition Software. Although reviewed after completion, some words and grammatical errors may remain.       Time spent: 15 minutes spent on the date of the encounter doing chart review, history and exam, documentation and further activities as noted above.  This was in addition to cystoscopy time    Jamal Andersen MD   Urology  [facility]  Clinic Phone [phone]

## 2021-07-26 NOTE — PROCEDURES
CYSTOSCOPY PROCEDURE NOTE:    Todd S Aschoff is a 65 year old male  who presents with LUTS and OAB (over-active bladder)  for cystoscopy.    Pt ID verified with patient: Yes     Procedure verified with patient: Yes     Procedure confirmed with physician and support staff: Yes     Consent form confirmed with physician and support staff.    Sign In  History and Physical Exam reviewed.  Informed Consent Discussed: Yes   Sign in Communication: Yes   Time Out:  Team Confirms the Correct Patient, Correct Procedure; Yes , Correct Site and Site Marking, Correct Position (if applicable).    Affirmation of Time Out: Yes   Sign Out:  Sign Out Discussion: Yes   Physician: Jamal Andersen MD    A urinalysis was performed revealing no evidence of infection.    The benefits, risks, alternatives of the cystoscopy procedure and personnel were discussed with the patient. The verbal consent was obtained and the patient agrees to proceed.      Description of procedure:   After fully informed, voluntary consent was obtained, the patient was brought into the procedure room, identified and placed in a supine position on the cystoscopy table.  The groin/scrotum were prepped with betadine and draped in a sterile fashion.  Urojet lidocaine gel was introduced.  A 15F flexible cystoscope was inserted into the urethra, and the bladder and urethra wereexamined in a systematic manner.  The patient tolerated the procedure well and there were no complications.      Cystoscopic findings:  The urethra was normal without strictures.  The prostate was 3-4cm long and demonstrated moderate bilobar hypertrophy.  There was a small median lobe.  The external sphincter coapted normally and the bladder neck was normal to slightly high. The bladder was  entered and careful pan endoscopy was carried out. The posterior, superior and lateral walls and dome of the bladder were all well visualized and the scope was retroflexed upon itself..  There was moderate  trabeculation with a small diverticula at the dome.  There were no neoplasms, stones identifed.  The ureteric orifices were normal in position and number and effluxing clear urine. The bladder did not distend well with a contraction and leakage with minimal filling.    Assessment/Plan:   Todd S Aschoff is a 65 year old male with a history of LUTS and OAB (over-active bladder)     - See clinic note      Jamal Andersen MD

## 2021-07-26 NOTE — NURSING NOTE
Chief Complaint   Patient presents with     Elevated PSA     Here for in office cystoscopy      PVR scan was 34ml today    Prior to the start of the procedure and with procedural staff participation, I verbally confirmed the patient s identity using two indicators, relevant allergies, that the procedure was appropriate and matched the consent or emergent situation, and that the correct equipment/implants were available. Immediately prior to starting the procedure I conducted the Time Out with the procedural staff and re-confirmed the patient s name, procedure, and site/side. I have wiped the patient off with the povidone-Iodine solution, draped them,  used Lidocaine hydrochloride jelly, and instilled sterile water into the bladder. (The Joint Commission universal protocol was followed.)  Yes    Sedation (Moderate or Deep): Urojet    5mL 2% lidocaine hydrochloride Urojet instilled into urethra.    NDC# [address]-1  Lot #: [id number]  Expiration Date:  7-22    Yashira Ch

## 2021-07-26 NOTE — PATIENT INSTRUCTIONS

## 2021-07-26 NOTE — LETTER
"7/26/2021       RE: Todd S Aschoff  [address]  [place] [address]     Dear Colleague,    Thank you for referring your patient, Todd S Aschoff, to the [facility] at [facility]. Please see a copy of my visit note below.    [place]  CHIEF COMPLAINT   It was my pleasure to see Todd S Aschoff who is a 64 year old male for follow-up of Elevated PSA.      HPI   Todd S Aschoff is a very pleasant 65 year old male who presents with a history of Elevated PSA.  Patient had previously followed with Dr. Gonzales and was referred to Dr. Mock for discussion of possible InterStim or Botox for his overactive bladder symptoms.  However he was noted to have an elevated PSA and I discussed with him proceeding with a prostate biopsy.  He underwent a MR fusion biopsy on February 17 which was based on his prior MRI from 2017.  He returns today to discuss the results of his biopsy.  There is no evidence of prostate cancer.    6/23/21:  On mirabegron (Myrbetriq) for the past 2 years   He is on Warfarin secondary to a heart valve   Follow-up today he is very interested in a REZUM  He does not recall discussion of an InterStim    TODAY 7/26/21:  Follow-up today for cystoscopy  He notes his most bothersome symptom is nocturia and proceed frequency  He does note that he has had 4 small strokes in the prior year    AUASS: 3-3-1-0-1-0-2/3 = 10/11  QOL = 5  PVR = 34cc    PHYSICAL EXAM  Patient is a 65 year old  male   Vitals: Blood pressure 130/60, pulse 76, height 1.854 m (6' 1\"), weight 139.7 kg (308 lb), SpO2 97 %.  Body mass index is 40.64 kg/m .  General Appearance Adult:   Alert, no acute distress, oriented  HENT: throat/mouth:normal, good dentition  Lungs: no respiratory distress, or pursed lip breathing  Heart: No obvious jugular venous distension present  Abdomen: soft, nontender, no organomegaly or masses  Musculoskeltal: extremities normal, no peripheral " edema  Skin: no suspicious lesions or rashes  Neuro: Alert, oriented, speech and mentation normal  Psych: affect and mood normal  Gait: Normal  : penis, scrotum, testes normal      Component PSA PSA Diag Urologic Phys     Component PSA PSA Diag Urologic Phys   Latest Ref Rng & Units 0.00 - 4.00 ug/L 0.00 - 4.00 ng/mL   4/20/2004 0.56    5/12/2006 1.11    9/5/2007 1.82    10/29/2008 2.46    10/23/2009 1.44    12/27/2010 4.68 (H)    1/6/2012 4.13 (H)    10/31/2012 4.08 (H)    12/17/2013 3.70    12/24/2014 3.37    1/8/2016 4.57 (H)    1/25/2017  13.80 (H)   2/22/2017 5.84 (H)    11/1/2017  3.80   6/12/2018 3.84    10/29/2019 6.28 (H)    1/14/2020 6.93 (H)    7/26/2021 4.80 (H)      PATHOLOGY:  A.-M. - no evidence of disease     IMAGING:  All pertinent imaging reviewed:    All imaging studies reviewed by me.  I personally reviewed these imaging films.  A formal report from radiology will follow.  FINDINGS:  Prostate gland size: 35 x 51 x 50  Volume: 46.4 g     Peripheral zone: In the left posterior peripheral zone in the mid  gland (series 7 image 31), there is a 4 x 4 by 4 mm focus of T2  hypointensity with moderately decreased ADC signal, no significant  increased signal on high B value diffusion weighted imaging, and no  early enhancement (best visualized on series 9 image 15, series 6  image 19, series 5 image 25, series 3 image 13).     PI-RADS:  Peripheral zone T2: 3  Diffusion-weighted image: 3    Contrast-enhanced images: Negative       Overall assessment: 3     Transitional zone: There are BPH type changes without suspicious  lesion.     PI-RADS:  Transition zone T2: 2  Diffusion-weighted image: 2  Contrast-enhanced images: Negative       Overall assessment: 2     Remainder of the pelvis:  No lymphadenopathy. The visualized  vasculature is patent. Partially decompressed bladder, otherwise  unremarkable. Partially evaluated bilateral hip prosthesis,  surrounding metal artifact limits evaluation. No suspicious  focal  lesions in the bones or the soft tissue. The visualized bowel is  unremarkable. Small bilateral fat-containing inguinal hernias.  Hydrocele.                                                     IMPRESSION:   1. Based on the most suspicious abnormality, this exam is  characterized as PIRADS 3 - Intermediate probability.  The presence of  clinically significant cancer is equivocal. The most suspicious focus  is in the left posterior lateral peripheral zone at the 5:00 position  measuring 4 mm. There is no evidence for extraprostatic extension.  2. No evidence of extraprostatic malignancy. No suspicious adenopathy  or evidence of pelvic metastases.    ASSESSMENT and PLAN  65-year-old man with history of elevated PSA and overactive bladder symptoms    Elevated PSA  -He is now status post an MR fusion biopsy with no evidence of prostate cancer  -PSA today stable and decreased to 4.80  -Plan for continued annual monitoring    LUTS and overactive bladder symptoms  -Cystoscopy today with some evidence of possible outlet obstruction, however also evidence of a overactive bladder.  His bladder only partially distended before having a spontaneous contraction which she seemed unaware of  -We discussed the need for further work-up with urodynamics prior to any bladder outlet procedure  -Follow-up with me with a virtual visit after his urodynamics    Chart documentation with Dragon Voice recognition Software. Although reviewed after completion, some words and grammatical errors may remain.       Time spent: 15 minutes spent on the date of the encounter doing chart review, history and exam, documentation and further activities as noted above.  This was in addition to cystoscopy time    Jamal Andersen MD   Urology  [facility]  Clinic Phone [phone]

## 2021-07-28 ENCOUNTER — HOSPITAL ENCOUNTER (EMERGENCY)
Facility: CLINIC | Age: 66
Discharge: HOME | End: 2021-07-28
Attending: EMERGENCY MEDICINE | Admitting: EMERGENCY MEDICINE
Payer: COMMERCIAL

## 2021-07-28 VITALS
DIASTOLIC BLOOD PRESSURE: 84 MMHG | SYSTOLIC BLOOD PRESSURE: 126 MMHG | HEART RATE: 87 BPM | TEMPERATURE: 97.5 F | BODY MASS INDEX: 41.03 KG/M2 | OXYGEN SATURATION: 97 % | WEIGHT: 311 LBS | RESPIRATION RATE: 18 BRPM

## 2021-07-28 DIAGNOSIS — F41.9 ANXIETY: ICD-10-CM

## 2021-07-28 LAB
AMPHETAMINES UR QL SCN: ABNORMAL
BARBITURATES UR QL: ABNORMAL
BENZODIAZ UR QL: ABNORMAL
CANNABINOIDS UR QL SCN: ABNORMAL
COCAINE UR QL: ABNORMAL
ETHANOL UR QL SCN: ABNORMAL
OPIATES UR QL SCN: ABNORMAL

## 2021-07-28 PROCEDURE — 90791 PSYCH DIAGNOSTIC EVALUATION: CPT

## 2021-07-28 PROCEDURE — 99283 EMERGENCY DEPT VISIT LOW MDM: CPT | Performed by: EMERGENCY MEDICINE

## 2021-07-28 PROCEDURE — 80307 DRUG TEST PRSMV CHEM ANLYZR: CPT | Performed by: EMERGENCY MEDICINE

## 2021-07-28 PROCEDURE — 99285 EMERGENCY DEPT VISIT HI MDM: CPT | Mod: 25 | Performed by: EMERGENCY MEDICINE

## 2021-07-28 RX ORDER — VENLAFAXINE HYDROCHLORIDE 75 MG/1
225 CAPSULE, EXTENDED RELEASE ORAL DAILY
Qty: 30 CAPSULE | Refills: 0 | Status: SHIPPED | OUTPATIENT
Start: 2021-07-28 | End: 2021-08-13

## 2021-07-28 NOTE — ED TRIAGE NOTES
Patient presents today due to panic attacks for the past two weeks. Patient is currently taking medications, but would like them to be evaluated.

## 2021-07-28 NOTE — ED NOTES
Patient presents to ED with panic attack and anger and states symptoms started 2 weeks ago and now more often episodes; patient denies suicidal and homicidal thoughts or plan; denies alcohol or drug use.

## 2021-07-28 NOTE — ED PROVIDER NOTES
ED Provider Note  [facility]      History     Chief Complaint   Patient presents with     Panic Attack     Patient has had 7 panic attacks in the last two weeks. Would like medications evaluated. Patient has had 4 strokes in 1 year.     HPI Todd S Aschoff is a 65 year old male with hx of multiple medical issues including mechanical valve replacement on coumadin, strokes, hypothyroidism, paroxysmal atrial fib, memory difficulties, depression, anxiety, alcohol dependence in remission, back issues who presents to the ED wanting help with his medications. He says he is waiting for a new provider appointment but has been having difficulty with anxiety and feels he needs his effexor dose adjusted.  He says he is on disability due to back surgery 20 years ago that didn't go quite right.  He has been having marital issues for many years.  He and his wife sleep in different bedrooms and argue a lot.  His daughter and son in law and kids live with them.  There is a lot of energy because of this and it can be stressful at times.  He fell in December.  He has had 4 strokes this past year.  His INR levels have been off due to diet.  Last month he noted increased anger, depression, anxiety and trouble sleeping.  In the past 2 weeks he is having more difficulty with anxiety and is having panic attacks.  He can note sob, racing heart.  He is trying calming exercised/deep breathing.  He had stopped taking his meds for 3 days but took them today.  Yesterday he was planning to leave his wife and home and go live someplace warm.  He denies si/hi/sib/hallucinations.  He is taking effexor.  Due to insurance issues he is getting a new pmd and sees them in about 2 weeks.  He has also started therapy but has to have 2 more appointments before they will give him a med provider for mental health.  He use to have a psychiatrist but hasn't seen them in 6 years and they aren't available.     Past Medical  History  Past Medical History:   Diagnosis Date     Advanced directives, counseling/discussion 1/6/2012    Discussed Advance Directive planning with patient; information given to patient to review.     Alcohol dependence (H)      Alcohol dependence in remission (H) 8/2/2018     Allergy, unspecified not elsewhere classified     seasonal     Anemia, unspecified type 1/22/2021     Aortic valve prosthesis present 2/8/2021     Atrial fibrillation (H)      Benign prostatic hyperplasia 2/8/2021     Bilateral thoracic back pain 11/16/2016     BPH (benign prostatic hypertrophy)      Chronic atrial fibrillation (H) 8/7/2002     Chronic infection     low back wound incision not healing      Chronic low back pain 8/19/2011     Chronic pain     lower back and right leg and left leg     Depressive disorder 6/9/2010    Depression  NOS     Dissection of aorta, thoracic (H) 1992    St Jose F aotic valve + arch graft 1992     Elevated prostate specific antigen (PSA) 1/22/2020     Family history of prostate cancer 1/22/2020     Generalized muscle weakness 1/22/2021     Headache(784.0)      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 Provider to review     Leg wound, left 1/9/2021     Long term current use of anticoagulant therapy 8/7/2002    LW Modifier:  Coumadin, since mechanical aortic valve LW Onset:  1992 ; Anticoagulant Rx Long Term Problem list name updated by automated process. Provider to review     Low back pain      Lumbar radiculopathy 4/3/2013     Lumbar surgical wound fluid collection 5/23/2013     Major depression      Memory difficulties 1/16/2015     Mixed hyperlipidemia      Morbid obesity (H) 4/20/2010    LW Modifier:  BMI 41.3 (Apr 2010) ; Obesity Morbid      Numbness and tingling     right leg post surg rightt hip/ also left leg since surg     OA (osteoarthritis)     hips     OAB (overactive bladder) 5/11/2015     Obesity, unspecified      Persons encountering health services in other specified circumstances 4/3/2012    Formatting of this note might be different from the original. EMERGENCY CARE PLAN Presenting Problem Signs and Symptoms Treatment Plan   Questions or conerns during clinic hours    I will call the clinic directly    Questions or conerns outside clinic hours    I will call the 24 hour nurse line at [phone]   Patient needs to schedule an appointment    I will call the 24 hour scheduling team at     Polypharmacy 1/22/2021     Prostate infection      Radiculitis 4/20/2010    LW Modifier:  Right foot, s/p R AMANDA LW Onset:  2002 ; Neuralgia     Recurrent major depressive episodes, in full remission (H) 10/30/2012     Renal insufficiency 1/22/2021     Rotator cuff tear 1/26/2015    Rotator cuff tear, right     S/P lumbar fusion 4/5/2018     S/P [employer] 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 Jose F's valve     APPLY WOUND VAC Left 1/26/2021    Procedure: Placement of negative pressure wound therapy 2,400 squared centimeters  ;  Surgeon: Lazaro Plascencia MD;  Location: [facility] OR     C TOTAL HIP ARTHROPLASTY  2010    Left AMANDA     C TOTAL HIP ARTHROPLASTY  2002    R hip replacement comp's by nerve injury with pain down into R leg, nadya below knee     CATARACT IOL, RT/LT      rt eye only     CHOLECYSTECTOMY,  LAPOROSCOPIC  8/10     CLOSE SECONDARY WOUND LOWER EXTREMITY Left 2/3/2021    Procedure:  Split Thickness Skin Graft to Leg of 18 square centimeters  Intermediate Closure of Leg of 8cm   ;  Surgeon: Lazaro Plascencia MD;  Location: [facility] OR     COLONOSCOPY N/A 1/15/2015    Procedure: COLONOSCOPY;  Surgeon: Johnson Kothari MD;  Location:  GI     DECOMPRESSION LUMBAR ONE LEVEL  4/3/2013    Procedure: DECOMPRESSION LUMBAR ONE LEVEL;  Open Decompression L3-4 bilateral;  Surgeon: Travis Coffey MD;  Location: [facility] OR     DECOMPRESSION, FUSION CERVICAL ANTERIOR ONE LEVEL, COMBINED  3/23/2012    Procedure:COMBINED DECOMPRESSION, FUSION CERVICAL ANTERIOR ONE LEVEL; Anterior Cervical Decompression and Fusion C4-6; Surgeon:TRAVIS COFFEY; Location: OR     ELBOW SURGERY       EXPLORE SPINE, REMOVE HARDWARE, COMBINED  5/23/2013    Procedure: COMBINED EXPLORE SPINE, REMOVE HARDWARE;  Exploration Lumbar Wound for fluid collection;  Surgeon: Travis Coffey MD;  Location: [facility] OR     FUSION CERVICAL ANTERIOR TWO LEVELS  3/26/2012    Procedure:FUSION CERVICAL ANTERIOR TWO LEVELS; Anterior Cervical Fusion C4-6, Anterior Cervical  Hematoma Evacuation; Surgeon:TRAVIS COFFEY; Location:[facility] OR     IR LUMBAR EPIDURAL STEROID INJECTION  3/28/2003     IRRIGATION AND DEBRIDEMENT LOWER EXTREMITY, COMBINED Left 1/10/2021    Procedure: 1.  Irrigation and excisional debridement to muscle left distal medial calf chronic wounds total area measuring 9 cm x 4 cm 2.  Irrigation and excisional debridement to fascia left medial calf chronic hematoma measuring 29 x 6.7 x 4.2 cm 3.  Primary complex wound closure left medial distal calf 9 cm in length;  Surgeon: Rod Kemp MD;  Location:  OR     IRRIGATION AND DEBRIDEMENT LOWER EXTREMITY, COMBINED Left 1/26/2021    Procedure: Evacuation of hematoma debridement of skin, subcutaneous tissue and muscle 2,400 squared centimeters, placement of negative pressure wound therapy  2,400 squared centimeters;  Surgeon: Lazaro Plascencia MD;  Location: [facility] OR     IRRIGATION AND DEBRIDEMENT SPINE, CLOSE WOUND, COMBINED  3/26/2012    Procedure:COMBINED IRRIGATION AND DEBRIDEMENT SPINE, CLOSE WOUND; Surgeon:TRAVIS COFFEY; Location:[facility] OR     OTHER SURGICAL HISTORY      OK UNLISTED ORBIT     OTHER SURGICAL HISTORY      OK UNLISTED SPINE     OTHER SURGICAL HISTORY      OK UNLISTED NECK/THORAX     OTHER SURGICAL HISTORY      (IA) OK TOTAL HIP ARTHROPLASTY     OTHER SURGICAL HISTORY      (IA) OK NEE SCOPE MED W LAT MENISCECT WWO DEBRIBE/SHAVE ANY CO     removal of cyst of back   2.5week     TONSILLECTOMY       wisdom teeth[       ZZC NONSPECIFIC PROCEDURE      repair of TAA with graft     venlafaxine (EFFEXOR-XR) 75 MG 24 hr capsule  acetaminophen (TYLENOL) 500 MG tablet  aspirin (ASA) 81 MG chewable tablet  cetirizine (ZYRTEC) 10 MG tablet  cyclobenzaprine (FLEXERIL) 10 MG tablet  diazepam (VALIUM) 5 MG tablet  donepezil (ARICEPT) 10 MG tablet  folic acid (FOLVITE) 1 MG tablet  guanFACINE (TENEX) 1 MG tablet  lamoTRIgine (LAMICTAL) 25 MG tablet  levothyroxine (SYNTHROID/LEVOTHROID) 150 MCG tablet  liothyronine (CYTOMEL) 25 MCG tablet  mirabegron (MYRBETRIQ) 50 MG 24 hr tablet  pregabalin (LYRICA) 100 MG capsule  propafenone (RYTHMOL) 150 MG TABS tablet  venlafaxine (EFFEXOR-XR) 150 MG 24 hr capsule  warfarin ANTICOAGULANT (COUMADIN) 2.5 MG tablet  warfarin ANTICOAGULANT (COUMADIN) 2.5 MG tablet      Allergies   Allergen Reactions     Gabapentin      Severe behavioral disturbances     Family History  Family History   Problem Relation Age of Onset     Cerebrovascular Disease Father          age 86, had M.I. ,diabetic also     Prostate Cancer Father      Cancer Mother          age 83 of dementia, also h/o lymphoma     Hypertension Brother         2 brothers with hypertension & sleep apnea     Hypertension Sister         Born ~1936     Sleep Apnea Brother          age 78, Alzheimers      No Known Problems Son      No Known Problems Daughter      No Known Problems Son      Family History Negative Brother         Had 5 brothers, three still alive as of 2019     Colon Cancer No family hx of      Diabetes Mother      Diabetes Father      Social History   Social History     Tobacco Use     Smoking status: Never Smoker     Smokeless tobacco: Never Used   Substance Use Topics     Alcohol use: No     Comment: Stopped drinking alcohol ~2009     Drug use: No      Past medical history, past surgical history, medications, allergies, family history, and social history were reviewed with the patient. No additional pertinent items.       Review of Systems   Psychiatric/Behavioral: Negative for hallucinations, self-injury and suicidal ideas. The patient is nervous/anxious.    All other systems reviewed and are negative.    A complete review of systems was performed with pertinent positives and negatives noted in the HPI, and all other systems negative.    Physical Exam   BP: (!) 146/102  Pulse: 77  Temp: 97.5  F (36.4  C)  Resp: 18  Weight: 141.1 kg (311 lb)  SpO2: 98 %  Physical Exam  Vitals and nursing note reviewed.   HENT:      Head: Normocephalic and atraumatic.      Nose: No congestion or rhinorrhea.   Eyes:      General: No scleral icterus.        Right eye: No discharge.         Left eye: No discharge.   Cardiovascular:      Rate and Rhythm: Normal rate.   Pulmonary:      Effort: Pulmonary effort is normal.   Musculoskeletal:         General: No deformity.      Cervical back: Normal range of motion.   Skin:     Coloration: Skin is not jaundiced.   Neurological:      Mental Status: He is alert and oriented to person, place, and time.   Psychiatric:         Attention and Perception: Attention and perception normal.         Mood and Affect: Affect normal. Mood is anxious.         Speech: Speech normal.         Behavior: Behavior normal. Behavior is cooperative.         Thought Content: Thought content normal.          Cognition and Memory: Cognition normal.         Judgment: Judgment normal.         ED Course      Procedures       The medical record was reviewed and interpreted.  Current labs reviewed and interpreted.       Results for orders placed or performed during the hospital encounter of 07/28/21   Drug abuse screen 6 urine (tox)     Status: Abnormal   Result Value Ref Range    Amphetamines Urine Screen Negative Screen Negative    Barbiturates Urine Screen Negative Screen Negative    Benzodiazepines Urine Screen Positive (A) Screen Negative    Cannabinoids Urine Screen Negative Screen Negative    Cocaine Urine Screen Negative Screen Negative    Ethanol Urine Screen Negative Screen Negative    Opiates Urine Screen Negative Screen Negative     Medications - No data to display     Assessments & Plan (with Medical Decision Making)   Todd S Aschoff is a 65 year old male with hx of multiple medical issues including mechanical valve replacement on coumadin, strokes, hypothyroidism, paroxysmal atrial fib, memory difficulties, depression, anxiety, alcohol dependence in remission, back issues who presents to the ED wanting help with his medications. He is having difficulty with anxiety and panic over the past few weeks.  He is waiting for a new pmd appointment in 2 weeks.  He does not have a psychiatrist.  He says his son in law gives him his meds so he isn't quite sure what all he takes.  I see valium and flexeril for back issues and he does have benzos in his utox.  He isn't sure if he is taking valium and how much.  He does have hx of memory issues.  I discussed his med change options with Dr. Buck, psychiatry who recommended increasing effexor to 225 and keep the remainder of his meds the same.  He agreed with this plan and was discharged home.     I have reviewed the nursing notes. I have reviewed the findings, diagnosis, plan and need for follow up with the patient.    Discharge Medication List as of 7/28/2021  8:09  PM      START taking these medications    Details   !! venlafaxine (EFFEXOR-XR) 75 MG 24 hr capsule Take 3 capsules (225 mg) by mouth daily for 10 days, Disp-30 capsule, R-0, Local Print       !! - Potential duplicate medications found. Please discuss with provider.          Final diagnoses:   Anxiety       --  Alysa TUCKER [facility] EMERGENCY DEPARTMENT  7/28/2021     Alysa William MD  07/29/21 2125

## 2021-07-29 ENCOUNTER — DOCUMENTATION ONLY (OUTPATIENT)
Dept: ANTICOAGULATION | Facility: CLINIC | Age: 66
End: 2021-07-29

## 2021-07-29 DIAGNOSIS — Z53.9 DIAGNOSIS NOT YET DEFINED: Primary | ICD-10-CM

## 2021-07-29 PROCEDURE — G0179 MD RECERTIFICATION HHA PT: HCPCS | Performed by: INTERNAL MEDICINE

## 2021-07-29 ASSESSMENT — ENCOUNTER SYMPTOMS
NERVOUS/ANXIOUS: 1
HALLUCINATIONS: 0

## 2021-07-29 NOTE — ED NOTES
7/28/2021  Todd S Aschoff 1955     [facility] Crisis Assessment:    Started at: 6:18 PM  Completed at: 6:51 PM  Patient was assessed via virtually ([employer] cart or other teleconferencing device).    Chief Complaint and History of Presenting Problem:  Patient is a 65 year old  male who presented to the ED by Self related to concerns for anxiety and panic attacks. Pt reports increase in anxiety, panic attacks and anger over the past several months. Reports panic attacks started about 2 weeks ago and he reports 5 episodes in the 2 week period. He reports anger and stress due to marital strain and other family stressors. Pt reports his daughter , son in law, and their 2 children ages 5 and 7 live in the home. Pt is in between psychiatry providers and feels his medications need to be adjusted.  Reports 3 days ago he stopped taking his medications and had plans to move out of his family home. Reports he took his medications this morning and his son in law drove him to the ED after he talked with his family and they asked him to come for evaluation.        Psychotherapy techniques or interventions utilized throughout assessment include: Establishing rapport, Active listening, Assess dimensions of crisis, Apply solution-focused therapy to address current crisis, Establish a discharge plan, Brief Supportive Therapy and Safety planning    Biopsychosocial Background  Pt reports he had a back surgery about 20 years ago and sustained nerve damage to his spine. He has been on social security income since that time. He reports significant marital strain as well. He reports other medical issues over the last 8 months including calf injury, infections and strokes. He does not have any current outpatient therapy providers.         Mental Health History and Current Symptoms   Patient identifies historical diagnoses of depression and anxiety. At baseline, patient describes their mental health symptoms as manageable with his  medications. Pt reports recently he has been having increased difficulty managing stress, sadness, anger, depression and anxiety. He reports panic attacks over the last 2 weeks with physical symptoms which he has not experienced previously. He reports increasing his Effexor dose has been helpful in the past.    Mental Health History (prior psychiatric hospitalizations, civil commitments, programmatic care, etc): Pt was last hospitalized 11/27/2020 for attempted overdose and self harm. Pt reports another previous hospitalization about 10 years ago for depression and SI.   Family Mental and Chemical Health History: Pt reports he has not used alcohol for about 20 years. He reports previously history of alcohol use that worsened his depression.     Current and Historic Psychotropic Medications: Yes  Medication Adherent: Yes  Recent medication changes? Pt reports for 3 days this week he did not take his medications and resumed taking them this morning, 7/28/21    Current Providers  Primary Care Provider: Yes  Psychiatrist: Yes  Therapist: No  : No  [facility]: No  ACT Team: No  Other: No    Has an AUTUMN been signed? No       Relevant Medical Concerns  Patient identifies concerns with completing ADLs? No  Patient can ambulate independently? Yes  Other medical health concerns? No  History of concussion or TBI? No     Trauma History   Physical, Emotional, or Sexual abuse: No  Loss of a friend or family member to suicide: No  Other identified traumatic event or significant stressor: Yes    Current Symptoms  Attention, Hyperactivity, and Impulsivity: No   Anxiety:Yes: Panic attacks and Generalized Symptoms: Cognitive anxiety - feelings of doom, racing thoughts, difficulty concentrating , Excessive worry and Physiological anxiety - sweating, flushing, shaking, shortness of breath, or racing heart    Behavioral Difficulties: Yes: Agitation and Withdrawal/Isolation   Mood Symptoms: Yes: Feelings of helplessness , Feelings  of hopelessness , Increased irritability/agitation, Loss of interest / Anhedonia , Low self esteem , Sad, depressed mood  and Sleep disturbance    Appetite: No    Feeding and Eating: No  Interpersonal Functioning: No  Learning Disabilities/Cognitive/Developmental Disorders: No   General Cognitive Impairments: No  If yes, see completed Mini-Cog Assessment below.  Sleep: Yes: Difficulty staying sleep    Psychosis: No    Trauma: No     Substance Use History and Treatments  Pt reports history of alcohol use 20 years ago. Denies any drug or alcohol use currently.     Patient has recently completed a drug screen or BAL/Breathalyzer? Yes    History of Suicidal Ideation, Suicide Attempts, and Risk Formulation:   Patient does  have a history of suicidal ideation beginning about 10 years ago. Patient is not able to identify triggers to suicidal ideation. Patient does  acknowledge a history of suicide attempts. Previous attempts include overdose and cutting. Patient does  have a history of self-injurious behavior. Patient identifies self-injury via the following methods: cutting. Pt denies any recent suicidal ideation. Pt denies any recent thoughts of self harm.     ESS-6  1.a. Over the past 2 weeks, have you had thoughts of killing yourself? No   1.b. Have you ever attempted to kill yourself and, if yes, when did this last happen? Yes 11/27/2020  2. Recent or current suicide plan? No  3. Recent or current intent to act on ideation? No  4. Lifetime psychiatric hospitalization? Yes  5. Pattern of excessive substance use? No  6. Current irritability, agitation, or aggression? No  ESS-6 Score: 2    Patient denies current suicidal ideation, plans or intent.     Current risk factors for suicide include history of suicide attempt(s), chronic pain, poor interpersonal relationships, helplessness/hopelessness and chronic pain and/or chronic illness. Protective factors against suicide include responsibilities to others (spouse, pets,  children, etc.), culture, spiritual, or Yarsani beliefs and future oriented towards goals, hopes, dreams.    Other Risk Areas  Aggressive/assumptive/homicidal risk factors: No   Duty to warn?No   Was a Child Protection Report Made? No   Was a Adult Protection Report Made? No      Sexually inappropriate behavior? No      Vulnerability to sexual exploitation? No     Mental Status Exam:  Affect: Appropriate  Appearance: Appropriate   Attention Span/Concentration: Attentive    Eye Contact: Variable  Fund of Knowledge: Appropriate   Language /Speech Content: Fluent  Language /Speech Volume: Normal   Language /Speech Rate/Productions: Normal   Recent Memory: Intact  Remote Memory: Intact  Mood: Apathetic   Orientation:   Person: Yes   Place: Yes  Time of Day: Yes   Date: Yes   Situation (Do they understand why they are here?): Yes   Psychomotor Behavior: Normal   Thought Content: Clear  Thought Form: Intact    Assessments and Screeners  Mini-Cog Assessment  N/A    Clinical Summary and Disposition  Clinical summary (include strengths, protective factors, community resources, and assessment of vulnerability/risk):   Pt is 65 year old male with history of anxiety and depression. Pt was alert and oriented during assessment. Pt denied SI, SIB, HI and hallucinations. Pt denied any substance use. Pt reports concerns with anxiety, panic attacks, anger and depression. He is in between psychiatry providers and does not have an individual therapist. He does not work and has disability income. He reports significant anger, grief and interpersonal stressors. Pt reports positive effects from increasing doses of his medications in the past for his depression and anxiety. Pt concerned about increase in anxiety and panic symptoms. Pt recommended for outpatient therapy and psychiatry.         Diagnosis:  -Major depressive disorder, Recurrent episode, Moderate F33.1      -Generalized anxiety disorder F41.1                     Disposition:  Attending provider, Dr. Alysa William, consulted and does  agree with recommended disposition which includes Individual Therapy and Medication Management. Patient agrees with recommended level of care. Pt declined individual therapy and has interest in psychiatry provider.      Details of final disposition include: Medication management. Pt will be scheduled for medication management.       If Inpatient, is patient admitted voluntary? N/A   Patient aware of potential for transfer if there is not appropriate placement? NA  Patient is willing to travel outside of the [facility] for placement? NA   Central Intake Notified? NA    Safety and After Care Planning:        Safety Plan Provided? Yes    Follow-Up Plans and Providers: Pt will follow up with previously scheduled outpatient providers and if needed work with P To scheduled psychiatry.     Follow-Up Plan:  After care plan provided to the patient/guardian by: ED staff  After care plan provided to any additional sources/parties? No    Duration of face to face time with patient in minutes: .75 hrs    CPT code(s) utilized: 04447 - Psychotherapy for Crisis - 60 (30-74*) min      Reyna Carvalho [facility]

## 2021-07-29 NOTE — PROGRESS NOTES
ANTICOAGULATION  MANAGEMENT: Discharge Review    Todd S Aschoff chart reviewed for anticoagulation continuity of care    Emergency room visit on 7/28/21 for anxiety.    Discharge disposition: Home    Results:    No results for input(s): INR, [id number], ALMWH, AAUFH in the last 168 hours.  Anticoagulation inpatient management:     not applicable     Anticoagulation discharge instructions:     Warfarin dosing: home regimen continued   Bridging: No   INR goal change: No      Medication changes affecting anticoagulation: Yes: Venlafaxine was increased in ED, patient had stopped taking it for 3 days.     Additional factors affecting anticoagulation: No    Plan     No adjustment to anticoagulation plan needed - Patient is scheduled to have INR checked tomorrow with Home Care.     Patient not contacted    No adjustment to Anticoagulation Calendar was required    Blayne Johansen RN

## 2021-07-29 NOTE — ED NOTES
If I am feeling unsafe or I am in a crisis, I will:  - ask for help as needed   -Contact my established care providers   -Call the National Suicide Prevention Lifeline: [phone]   -Go to the nearest emergency room   -Call 911          Warning signs that I or other people might notice when a crisis is developing for me:   - difficulty sleeping  - increase in anger or stress   - difficulty managing anxiety     Things I am able to do on my own to cope or help me feel better:   - ask for help as needed  - take all medications as prescribed  - utilize deep breathing and other relaxation/grounding techniques     Things that I am able to do with others to cope or help me better:   - share thoughts and feelings  - spend time with grandchildren outside/playing    Things I can use or do for distraction:   - spending time with other people  - deep breathing exercises    Changes I can make to support my mental health and wellness:   - follow through with therapy and psychiatry appointments    People in my life that I can ask for help:   - son in law  - daughter  - wife  -crisis workers  - outpatient providers    Your [facility] has a mental health crisis team you can call 24/7:   - [facility] crisis at;  Call the Crisis Response Unit at [phone].     Other things that are important when I m in crisis:   - return to ED for concerns you will physically harm yourself or others  - utilize safety plan and outpatient resources as needed      Additional resources and information:   - follow through with all your scheduled outpatient providers  - contact [facility] at #[phone] if you need assistance with outpatient appointments as discussed

## 2021-07-29 NOTE — ED NOTES
Pt was given discharge information and prescription, no questions or concerns. Vital signs stable.

## 2021-07-29 NOTE — DISCHARGE INSTRUCTIONS
Continue with plan to follow up with new primary care doctor in 2 weeks (already scheduled).  Have them do a medication check with you to see how the increased effexor dose is working.     Also continue to work with your new therapist and work on getting a psychiatrist through this clinic.     Resume taking effexor 150 mg for one week.  After 7 days if tolerated well you can then increase to effexor 225 mg daily.  Further refills should be given through your new med provider.     P will call you in the next couple of days and help schedule a psychiatry appointment.

## 2021-07-30 ENCOUNTER — ANTICOAGULATION THERAPY VISIT (OUTPATIENT)
Dept: PEDIATRICS | Facility: CLINIC | Age: 66
End: 2021-07-30

## 2021-07-30 ENCOUNTER — TELEPHONE (OUTPATIENT)
Dept: PEDIATRICS | Facility: CLINIC | Age: 66
End: 2021-07-30

## 2021-07-30 DIAGNOSIS — M54.50 CHRONIC BILATERAL LOW BACK PAIN WITHOUT SCIATICA: ICD-10-CM

## 2021-07-30 DIAGNOSIS — Z95.2 AORTIC VALVE PROSTHESIS PRESENT: Primary | ICD-10-CM

## 2021-07-30 DIAGNOSIS — G89.29 CHRONIC BILATERAL LOW BACK PAIN WITHOUT SCIATICA: ICD-10-CM

## 2021-07-30 DIAGNOSIS — I48.0 PAF (PAROXYSMAL ATRIAL FIBRILLATION) (H): ICD-10-CM

## 2021-07-30 LAB — INR (EXTERNAL): 2.3 (ref 0.9–1.1)

## 2021-07-30 NOTE — TELEPHONE ENCOUNTER
Homecare calling to verify dose of lamictal.  Pt/son report that pt was told he could take 50 mg in the AM and 25 in PM but unable to find this documented anywhere.  Discharged from hospital in June with an increase to 25 mg BID.  Pt has been hospitalized twice since visit in May but has not followed up with PCP    Jensen العراقي RN, BSN

## 2021-07-30 NOTE — PROGRESS NOTES
ANTICOAGULATION MANAGEMENT     Todd S Aschoff 65 year old male is on warfarin with subtherapeutic INR result. (Goal INR 2.5-3.5)    Recent labs: (last 7 days)     07/30/21  1426   INR 2.3*       ASSESSMENT     Source(s): Home Care/Facility Nurse       Warfarin doses taken: Missed dose(s) may be affecting INR    Diet: No new diet changes identified    New illness, injury, or hospitalization: Yes: Pt went to ED 07/26 for panic attack.    Medication/supplement changes: Effexor dose increased which can increase risk of bleeding.    Signs or symptoms of bleeding or clotting: Yes: small bruise on chest and bottom.    Previous INR: Therapeutic last 2(+) visits    Additional findings: None     PLAN     Recommended plan for no diet, medication or health factor changes affecting INR     Dosing Instructions: Booster dose then continue your current warfarin dose with next INR in 1 week       Summary  As of 7/30/2021    Full warfarin instructions:  7/30: 10 mg; Otherwise 7.5 mg every Tue, Fri; 5 mg all other days   Next INR check:  8/6/2021             Telephone call with home care nurse Magui who verbalizes understanding and agrees to plan    Orders given to  Homecare nurse/facility to recheck    Education provided: Target INR goal and significance of current INR result    Plan made per [facility] anticoagulation protocol    Oscar Briscoe RN  Anticoagulation Clinic  7/30/2021    _______________________________________________________________________     Anticoagulation Episode Summary     Current INR goal:  2.5-3.5   TTR:  43.9 % (10.5 mo)   Target end date:  Indefinite   Send INR reminders to:  AIDA MCWILLIAMS    Indications    Mechanical AV Replacement 1992 -- on Warfarin  [Z95.2]  PAF (paroxysmal atrial fibrillation) (H) [I48.0]  Long term current use of anticoagulant therapy (Resolved) [Z79.01]  Chronic atrial fibrillation (H) (Resolved) [I48.20]           Comments:           Anticoagulation Care Providers     Provider Role  Specialty Phone number    Obdulio Ingram MD Referring Internal Medicine [phone]

## 2021-07-30 NOTE — PROGRESS NOTES
Magui BOOTHE called with patient INR of 2.3 today.     Please call  with dosing.     Linda Morales RN, BSN, PHN  Anticoagulation Nurse  [phone]

## 2021-08-02 ENCOUNTER — TELEPHONE (OUTPATIENT)
Dept: INTERNAL MEDICINE | Facility: CLINIC | Age: 66
End: 2021-08-02

## 2021-08-03 RX ORDER — CYCLOBENZAPRINE HCL 10 MG
TABLET ORAL
Qty: 270 TABLET | Refills: 0 | Status: SHIPPED | OUTPATIENT
Start: 2021-08-03 | End: 2022-01-10

## 2021-08-05 DIAGNOSIS — Z53.9 DIAGNOSIS NOT YET DEFINED: Primary | ICD-10-CM

## 2021-08-05 DIAGNOSIS — Z95.2 AORTIC VALVE PROSTHESIS PRESENT: Primary | ICD-10-CM

## 2021-08-05 PROCEDURE — G0179 MD RECERTIFICATION HHA PT: HCPCS | Performed by: INTERNAL MEDICINE

## 2021-08-05 RX ORDER — WARFARIN SODIUM 2.5 MG/1
TABLET ORAL
Qty: 200 TABLET | Refills: 0 | Status: SHIPPED | OUTPATIENT
Start: 2021-08-05 | End: 2021-08-27

## 2021-08-05 NOTE — TELEPHONE ENCOUNTER
Current warfarin dose:  Warfarin maintenance plan:  7.5 mg every Tue, Fri; 5 mg all other days   Weekly warfarin total:  40 mg   Next INR check:  8/6/2021     Last INR result:    INR (External)   Date Value Ref Range Status   07/30/2021 2.3 (A) 0.9 - 1.1 Final     Last office visit: 12/11/2020     Refill authorized per [facility] protocol.    Adam PAGE RN

## 2021-08-05 NOTE — TELEPHONE ENCOUNTER
"Requested Prescriptions   Pending Prescriptions Disp Refills     warfarin ANTICOAGULANT (COUMADIN) 2.5 MG tablet  Last Written Prescription Date:    Last Fill Quantity: ,  # refills:    Last office visit: 12/11/2020 with prescribing provider:  VV 05/26/21/INR 07/16/21   Future Office Visit:                 Sig: Take 3 tablets (7.5 mg) by mouth daily Mon, Tues, Thurs, Sat       Vitamin K Antagonists Failed - 8/5/2021  3:50 PM        Failed - INR is within goal in the past 6 weeks     Confirm INR is within goal in the past 6 weeks.     Recent Labs   Lab Test 07/30/21  1426   INR 2.3*                       Passed - Recent (12 mo) or future (30 days) visit within the authorizing provider's specialty     Patient has had an office visit with the authorizing provider or a provider within the authorizing providers department within the previous 12 mos or has a future within next 30 days. See \"Patient Info\" tab in inbasket, or \"Choose Columns\" in Meds & Orders section of the refill encounter.              Passed - Medication is active on med list        Passed - Patient is 18 years of age or older             "

## 2021-08-06 ENCOUNTER — TELEPHONE (OUTPATIENT)
Dept: INTERNAL MEDICINE | Facility: CLINIC | Age: 66
End: 2021-08-06

## 2021-08-06 ENCOUNTER — ANTICOAGULATION THERAPY VISIT (OUTPATIENT)
Dept: PEDIATRICS | Facility: CLINIC | Age: 66
End: 2021-08-06

## 2021-08-06 DIAGNOSIS — I48.0 PAF (PAROXYSMAL ATRIAL FIBRILLATION) (H): ICD-10-CM

## 2021-08-06 DIAGNOSIS — Z95.2 AORTIC VALVE PROSTHESIS PRESENT: Primary | ICD-10-CM

## 2021-08-06 LAB — INR (EXTERNAL): 2.3 (ref 0.9–1.1)

## 2021-08-06 NOTE — PROGRESS NOTES
ANTICOAGULATION MANAGEMENT     Todd S Aschoff 65 year old male is on warfarin with subtherapeutic INR result. (Goal INR 2.5-3.5)    Recent labs: (last 7 days)     08/06/21  1442   INR 2.3*       ASSESSMENT     Source(s): Chart Review and Home Care/Facility Nurse       Warfarin doses taken: Warfarin taken as instructed    Diet: No new diet changes identified    New illness, injury, or hospitalization: No    Medication/supplement changes: None noted    Signs or symptoms of bleeding or clotting: No    Previous INR: Subtherapeutic    Additional findings: None     PLAN     Recommended plan for no diet, medication or health factor changes affecting INR     Dosing Instructions: Continue your current warfarin dose with next INR in 1 week       Summary  As of 8/6/2021    Full warfarin instructions:  7.5 mg every Sun, Tue, Fri; 5 mg all other days   Next INR check:               Telephone call with home care nurse Clara who verbalizes understanding and agrees to plan    Orders given to  Homecare nurse/facility to recheck    Education provided: None required    Plan made per [facility] anticoagulation protocol    Britta Snowden RN  Anticoagulation Clinic  8/6/2021    _______________________________________________________________________     Anticoagulation Episode Summary     Current INR goal:  2.5-3.5   TTR:  43.9 % (10.5 mo)   Target end date:  Indefinite   Send INR reminders to:  AIDA MCWILLIAMS    Indications    Mechanical AV Replacement 1992 -- on Warfarin  [Z95.2]  PAF (paroxysmal atrial fibrillation) (H) [I48.0]  Long term current use of anticoagulant therapy (Resolved) [Z79.01]  Chronic atrial fibrillation (H) (Resolved) [I48.20]           Comments:           Anticoagulation Care Providers     Provider Role Specialty Phone number    Obdulio Ingram MD Referring Internal Medicine [phone]

## 2021-08-06 NOTE — TELEPHONE ENCOUNTER
[facility]   INR results from 7/16/21  Received 7/23/21, signed by Dr Ingram  Faxed today

## 2021-08-06 NOTE — TELEPHONE ENCOUNTER
[facility] & Plan of Care (7/11/21 - 9/8/21)  Form received 7/23/21, Signed by Dr Ingram   Faxed today

## 2021-08-13 ENCOUNTER — OFFICE VISIT (OUTPATIENT)
Dept: PEDIATRICS | Facility: CLINIC | Age: 66
End: 2021-08-13
Payer: COMMERCIAL

## 2021-08-13 ENCOUNTER — ANTICOAGULATION THERAPY VISIT (OUTPATIENT)
Dept: PEDIATRICS | Facility: CLINIC | Age: 66
End: 2021-08-13

## 2021-08-13 VITALS
OXYGEN SATURATION: 97 % | HEIGHT: 74 IN | WEIGHT: 315 LBS | SYSTOLIC BLOOD PRESSURE: 151 MMHG | DIASTOLIC BLOOD PRESSURE: 90 MMHG | TEMPERATURE: 99.2 F | BODY MASS INDEX: 40.43 KG/M2 | HEART RATE: 85 BPM

## 2021-08-13 DIAGNOSIS — E03.9 ACQUIRED HYPOTHYROIDISM: ICD-10-CM

## 2021-08-13 DIAGNOSIS — F32.A DEPRESSION, UNSPECIFIED DEPRESSION TYPE: ICD-10-CM

## 2021-08-13 DIAGNOSIS — Z95.2 AORTIC VALVE PROSTHESIS PRESENT: ICD-10-CM

## 2021-08-13 DIAGNOSIS — S81.802A WOUND OF LEFT LOWER EXTREMITY, INITIAL ENCOUNTER: ICD-10-CM

## 2021-08-13 DIAGNOSIS — G89.29 CHRONIC BILATERAL LOW BACK PAIN WITHOUT SCIATICA: Primary | ICD-10-CM

## 2021-08-13 DIAGNOSIS — M54.50 CHRONIC BILATERAL LOW BACK PAIN WITHOUT SCIATICA: Primary | ICD-10-CM

## 2021-08-13 DIAGNOSIS — Z95.2 AORTIC VALVE PROSTHESIS PRESENT: Primary | ICD-10-CM

## 2021-08-13 DIAGNOSIS — I48.0 PAF (PAROXYSMAL ATRIAL FIBRILLATION) (H): ICD-10-CM

## 2021-08-13 DIAGNOSIS — M62.830 BACK MUSCLE SPASM: ICD-10-CM

## 2021-08-13 DIAGNOSIS — N52.9 ERECTILE DYSFUNCTION, UNSPECIFIED ERECTILE DYSFUNCTION TYPE: ICD-10-CM

## 2021-08-13 DIAGNOSIS — N40.1 BENIGN PROSTATIC HYPERPLASIA WITH URINARY FREQUENCY: ICD-10-CM

## 2021-08-13 DIAGNOSIS — T81.89XA RETAINED SUTURE, INITIAL ENCOUNTER: ICD-10-CM

## 2021-08-13 DIAGNOSIS — Z18.9 RETAINED SUTURE, INITIAL ENCOUNTER: ICD-10-CM

## 2021-08-13 DIAGNOSIS — R35.0 BENIGN PROSTATIC HYPERPLASIA WITH URINARY FREQUENCY: ICD-10-CM

## 2021-08-13 DIAGNOSIS — I48.20 CHRONIC ATRIAL FIBRILLATION (H): ICD-10-CM

## 2021-08-13 PROBLEM — T14.8XXA HEMATOMA: Status: RESOLVED | Noted: 2021-01-09 | Resolved: 2021-08-13

## 2021-08-13 LAB — INR (EXTERNAL): 3 (ref 2.5–3.5)

## 2021-08-13 PROCEDURE — 84443 ASSAY THYROID STIM HORMONE: CPT | Performed by: INTERNAL MEDICINE

## 2021-08-13 PROCEDURE — 36415 COLL VENOUS BLD VENIPUNCTURE: CPT | Performed by: INTERNAL MEDICINE

## 2021-08-13 PROCEDURE — 99215 OFFICE O/P EST HI 40 MIN: CPT | Performed by: INTERNAL MEDICINE

## 2021-08-13 RX ORDER — DIAZEPAM 5 MG
5 TABLET ORAL
Qty: 30 TABLET | Refills: 0 | Status: SHIPPED | OUTPATIENT
Start: 2021-08-13 | End: 2021-09-03

## 2021-08-13 RX ORDER — PREGABALIN 100 MG/1
100 CAPSULE ORAL 3 TIMES DAILY
Qty: 270 CAPSULE | Refills: 3 | Status: SHIPPED | OUTPATIENT
Start: 2021-08-13 | End: 2022-09-02

## 2021-08-13 RX ORDER — VENLAFAXINE HYDROCHLORIDE 75 MG/1
225 CAPSULE, EXTENDED RELEASE ORAL DAILY
Qty: 270 CAPSULE | Refills: 1 | Status: SHIPPED | OUTPATIENT
Start: 2021-08-13 | End: 2022-03-25

## 2021-08-13 RX ORDER — PROPAFENONE HYDROCHLORIDE 150 MG/1
150 TABLET, COATED ORAL EVERY 8 HOURS
Qty: 270 TABLET | Refills: 1 | Status: SHIPPED | OUTPATIENT
Start: 2021-08-13 | End: 2022-07-25

## 2021-08-13 ASSESSMENT — MIFFLIN-ST. JEOR: SCORE: 2294.48

## 2021-08-13 NOTE — PROGRESS NOTES
ANTICOAGULATION MANAGEMENT     Todd S Aschoff 65 year old male is on warfarin with therapeutic INR result. (Goal INR 2.5-3.5)    Recent labs: (last 7 days)     08/13/21  1228   INR 3.0       ASSESSMENT     Source(s): Chart Review and Home Care/Facility Nurse       Warfarin doses taken: Warfarin taken as instructed    Diet: No new diet changes identified    New illness, injury, or hospitalization: No    Medication/supplement changes: None noted    Signs or symptoms of bleeding or clotting: No    Previous INR: Subtherapeutic    Additional findings: None     PLAN     Recommended plan for no diet, medication or health factor changes affecting INR     Dosing Instructions: Continue your current warfarin dose with next INR in 1 week       Summary  As of 8/13/2021    Full warfarin instructions:  7.5 mg every Sun, Tue, Fri; 5 mg all other days   Next INR check:  8/20/2021             Telephone call with home care nurse Clara who verbalizes understanding and agrees to plan    Orders given to  Homecare nurse/facility to recheck    Education provided: None required    Plan made per [facility] anticoagulation protocol    Jessi Lidner RN  Anticoagulation Clinic  8/13/2021    _______________________________________________________________________     Anticoagulation Episode Summary     Current INR goal:  2.5-3.5   TTR:  45.5 % (10.5 mo)   Target end date:  Indefinite   Send INR reminders to:  AIDA MCWILLIAMS    Indications    Mechanical AV Replacement 1992 -- on Warfarin  [Z95.2]  PAF (paroxysmal atrial fibrillation) (H) [I48.0]  Long term current use of anticoagulant therapy (Resolved) [Z79.01]  Chronic atrial fibrillation (H) (Resolved) [I48.20]           Comments:           Anticoagulation Care Providers     Provider Role Specialty Phone number    Obdulio Ingram MD Referring Internal Medicine [phone]

## 2021-08-13 NOTE — LETTER
My Depression Action Plan  Name: Todd S Aschoff   Date of Birth 1955  Date: 8/13/2021    My doctor: Herve Morgan   My clinic: [facility]  [address]  [address]  [place] [address] [phone]          GREEN    ZONE   Good Control    What it looks like:     Things are going generally well. You have normal ups and downs. You may even feel depressed from time to time, but bad moods usually last less than a day.   What you need to do:  1. Continue to care for yourself (see self care plan)  2. Check your depression survival kit and update it as needed  3. Follow your physician s recommendations including any medication.  4. Do not stop taking medication unless you consult with your physician first.           YELLOW         ZONE Getting Worse    What it looks like:     Depression is starting to interfere with your life.     It may be hard to get out of bed; you may be starting to isolate yourself from others.    Symptoms of depression are starting to last most all day and this has happened for several days.     You may have suicidal thoughts but they are not constant.   What you need to do:     1. Call your care team. Your response to treatment will improve if you keep your care team informed of your progress. Yellow periods are signs an adjustment may need to be made.     2. Continue your self-care.  Just get dressed and ready for the day.  Don't give yourself time to talk yourself out of it.    3. Talk to someone in your support network.    4. Open up your Depression Self-Care Plan/Wellness Kit.           RED    ZONE Medical Alert - Get Help    What it looks like:     Depression is seriously interfering with your life.     You may experience these or other symptoms: You can t get out of bed most days, can t work or engage in other necessary activities, you have trouble taking care of basic hygiene, or basic responsibilities, thoughts of suicide or death  that will not go away, self-injurious behavior.     What you need to do:  1. Call your care team and request a same-day appointment. If they are not available (weekends or after hours) call your local crisis line, emergency room or 911.          Depression Self-Care Plan / Wellness Kit    Many people find that medication and therapy are helpful treatments for managing depression. In addition, making small changes to your everyday life can help to boost your mood and improve your wellbeing. Below are some tips for you to consider. Be sure to talk with your medical provider and/or behavioral health consultant if your symptoms are worsening or not improving.     Sleep   Sleep hygiene  means all of the habits that support good, restful sleep. It includes maintaining a consistent bedtime and wake time, using your bedroom only for sleeping or sex, and keeping the bedroom dark and free of distractions like a computer, smartphone, or television.     Develop a Healthy Routine  Maintain good hygiene. Get out of bed in the morning, make your bed, brush your teeth, take a shower, and get dressed. Don t spend too much time viewing media that makes you feel stressed. Find time to relax each day.    Exercise  Get some form of exercise every day. This will help reduce pain and release endorphins, the  feel good  chemicals in your brain. It can be as simple as just going for a walk or doing some gardening, anything that will get you moving.      Diet  Strive to eat healthy foods, including fruits and vegetables. Drink plenty of water. Avoid excessive sugar, caffeine, alcohol, and other mood-altering substances.     Stay Connected with Others  Stay in touch with friends and family members.    Manage Your Mood  Try deep breathing, massage therapy, biofeedback, or meditation. Take part in fun activities when you can. Try to find something to smile about each day.     Psychotherapy  Be open to working with a therapist if your provider  recommends it.     Medication  Be sure to take your medication as prescribed. Most anti-depressants need to be taken every day. It usually takes several weeks for medications to work. Not all medicines work for all people. It is important to follow-up with your provider to make sure you have a treatment plan that is working for you. Do not stop your medication abruptly without first discussing it with your provider.    Crisis Resources   These hotlines are for both adults and children. They and are open 24 hours a day, 7 days a week unless noted otherwise.      National Suicide Prevention Lifeline   9-096-686-YLQB (9156)      Crisis Text Line    www.crisistextline.org  Text HOME to 008954 from anywhere in the [place], anytime, about any type of crisis. A live, trained crisis counselor will receive the text and respond quickly.      [employer] Lifeline for LGBTQ Youth  A national crisis intervention and suicide lifeline for LGBTQ youth under 25. Provides a safe place to talk without judgement. Call 1-929.241.3532; text START to 691544 or visit www.thetrevorproject.org to talk to a trained counselor.      For [facility] crisis numbers, visit the [facility] website at:  https://[place].gov/dhs/people-we-serve/adults/health-care/mental-health/resources/crisis-contacts.jsp

## 2021-08-13 NOTE — PATIENT INSTRUCTIONS
They will call you for a derm appointment to remove the retained suture.    Set up a complete physcial exam for November.    We'll do blood work then.    May take valium in AM if needed.    No changes in your other meds.    Specialist:  Psychiatry, neurology, and urology.   I would get back into your cardiologist for your propafenone prescription.    Your blood pressure was elevated.  Let's recheck this at your complete physcial exam.

## 2021-08-13 NOTE — PROGRESS NOTES
"    Assessment & Plan     Benign prostatic hyperplasia with urinary frequency  Management per uroloyg.     Wound of left lower extremity, initial encounter  Stable now; no further signs of infection or hematoma, s/p skin graft.     Mechanical AV Replacement 1992 -- on Warfarin   On coumadin.    Hypertension:  Poor control, but patient states he is nervous due to new primary care provider.  Will follow up at his complete physcial exam in 2-3 months and consider improved blood pressure meds.      Acquired hypothyroidism  Due for thyroid stimulating hormone (TSH).     Back muscle spasm  Use 1 time per day.  Ongoing for several years.   - diazepam (VALIUM) 5 MG tablet; Take 1 tablet (5 mg) by mouth nightly as needed for anxiety    Chronic bilateral low back pain without sciatica  Conteinue lyrica.   - pregabalin (LYRICA) 100 MG capsule; Take 1 capsule (100 mg) by mouth 3 times daily Do not take if sleepy/sedated.    Chronic atrial fibrillation (H)  To follow up with cardiology soon.  Continue rhythm control for now.   - propafenone (RYTHMOL) 150 MG TABS tablet; Take 1 tablet (150 mg) by mouth every 8 hours    Depression, unspecified depression type  To see psychiatry, and alread sees psychology.  His PHQ seems well controlled.   - venlafaxine (EFFEXOR-XR) 75 MG 24 hr capsule; Take 3 capsules (225 mg) by mouth daily    Retained suture, initial encounter  I can see just the tip; I recommend further exploration, derm should be able to help with this. Doubt surgery will need involvement.   - Adult Dermatology Referral; Future    Erectile dysfunction, unspecified erectile dysfunction type  Follow up in 2-3 months for possible erectile dysfunction meds.   - TSH with free T4 reflex; Future  - TSH with free T4 reflex             BMI:   Estimated body mass index is 40.61 kg/m  as calculated from the following:    Height as of this encounter: 1.88 m (6' 2\").    Weight as of this encounter: 143.5 kg (316 lb 4.8 oz).       Patient " "Instructions   They will call you for a derm appointment to remove the retained suture.    Set up a complete physcial exam for November.    We'll do blood work then.    May take valium in AM if needed.    No changes in your other meds.    Specialist:  Psychiatry, neurology, and urology.   I would get back into your cardiologist for your propafenone prescription.    Your blood pressure was elevated.  Let's recheck this at your complete physcial exam.       Return in about 3 months (around 11/13/2021) for physical.    Herve Morgan MD  [facility] [place] Maharaj is a 65 year old who presents for the following health issues     History of Present Illness       He eats 2-3 servings of fruits and vegetables daily.He consumes 0 sweetened beverage(s) daily.He exercises with enough effort to increase his heart rate 30 to 60 minutes per day.  He exercises with enough effort to increase his heart rate 5 days per week.   He is taking medications regularly.       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  Has been on disability since then. On flexeril (cyclobenzaprine) for back pain issues.  On valium for several years now.  Gets 60 at a time, usually lasts 20 days.     History of recent CVAs and seizure on lamictal.  Had been subtherapeutic on warfarin due to protein drinks.    He follows with urology for his prostate specific antigen (PSA) and uriantion issues.  On myrbetriq    To see psychology and eventually psychiatry.      Follows with neurology for his recent strokes and a seizure admission.  On aricept for memory issues.     Son in law is managing his pills.  He is     Has some bursitis in left shoulder; interested in redoing this.                Review of Systems   CONSTITUTIONAL: NEGATIVE for fever, chills, change in weight  EYES: NEGATIVE for vision changes or irritation  ENT/MOUTH: NEGATIVE for ear, mouth and throat problems  RESP: NEGATIVE for significant cough or SOB  BREAST: NEGATIVE for masses, tenderness or discharge  CV: NEGATIVE for chest pain, palpitations or peripheral edema  GI: NEGATIVE for nausea, abdominal pain, heartburn, or change in bowel habits  : NEGATIVE for frequency, dysuria, or hematuria  MUSCULOSKELETAL: NEGATIVE for significant arthralgias or myalgia  NEURO: NEGATIVE for weakness, dizziness or paresthesias  ENDOCRINE: NEGATIVE for temperature intolerance, skin/hair changes  HEME: NEGATIVE for bleeding problems  PSYCHIATRIC: NEGATIVE for changes in mood or affect      Objective    BP (!) 151/90 (BP Location: Right arm, Patient Position: Chair, Cuff Size: Adult Large)   Pulse 85   Temp 99.2  F (37.3  C) (Tympanic)   Ht 1.88 m (6' 2\")   Wt 143.5 kg (316 lb 4.8 oz)   SpO2 97%   BMI 40.61 kg/m    Body mass index is 40.61 kg/m .  Physical Exam   GENERAL: healthy, alert and no distress  EYES: Eyes grossly normal to inspection, PERRL and conjunctivae and sclerae normal  HENT: ear canals and TM's normal, nose and mouth without ulcers or lesions  NECK: " no adenopathy, no asymmetry, masses, or scars and thyroid normal to palpation  RESP: lungs clear to auscultation - no rales, rhonchi or wheezes  CV: Mechanical S1 and S2  ABDOMEN: soft, nontender, no hepatosplenomegaly, no masses and bowel sounds normal  MS: no gross musculoskeletal defects noted, no edema  SKIN: no suspicious lesions or rashes and retained suture in lower back.   NEURO: Normal strength and tone, mentation intact and speech normal  PSYCH: mentation appears normal, affect normal/bright              E5: Spent 40 minutes with pre-visit, post visit, and face-to-face time.

## 2021-08-14 LAB — TSH SERPL DL<=0.005 MIU/L-ACNC: 0.92 MU/L (ref 0.4–4)

## 2021-08-16 ASSESSMENT — ANXIETY QUESTIONNAIRES
6. BECOMING EASILY ANNOYED OR IRRITABLE: SEVERAL DAYS
7. FEELING AFRAID AS IF SOMETHING AWFUL MIGHT HAPPEN: NOT AT ALL
IF YOU CHECKED OFF ANY PROBLEMS ON THIS QUESTIONNAIRE, HOW DIFFICULT HAVE THESE PROBLEMS MADE IT FOR YOU TO DO YOUR WORK, TAKE CARE OF THINGS AT HOME, OR GET ALONG WITH OTHER PEOPLE: SOMEWHAT DIFFICULT
GAD7 TOTAL SCORE: 2
3. WORRYING TOO MUCH ABOUT DIFFERENT THINGS: NOT AT ALL
2. NOT BEING ABLE TO STOP OR CONTROL WORRYING: NOT AT ALL
5. BEING SO RESTLESS THAT IT IS HARD TO SIT STILL: NOT AT ALL
1. FEELING NERVOUS, ANXIOUS, OR ON EDGE: SEVERAL DAYS

## 2021-08-16 ASSESSMENT — PATIENT HEALTH QUESTIONNAIRE - PHQ9
5. POOR APPETITE OR OVEREATING: NOT AT ALL
SUM OF ALL RESPONSES TO PHQ QUESTIONS 1-9: 0

## 2021-08-17 DIAGNOSIS — G31.84 MILD COGNITIVE IMPAIRMENT: ICD-10-CM

## 2021-08-17 ASSESSMENT — ANXIETY QUESTIONNAIRES: GAD7 TOTAL SCORE: 2

## 2021-08-18 ENCOUNTER — TELEPHONE (OUTPATIENT)
Dept: INTERNAL MEDICINE | Facility: CLINIC | Age: 66
End: 2021-08-18

## 2021-08-19 ENCOUNTER — TELEPHONE (OUTPATIENT)
Dept: PEDIATRICS | Facility: CLINIC | Age: 66
End: 2021-08-19
Payer: MEDICARE

## 2021-08-20 ENCOUNTER — TRANSFERRED RECORDS (OUTPATIENT)
Dept: HEALTH INFORMATION MANAGEMENT | Facility: CLINIC | Age: 66
End: 2021-08-20

## 2021-08-20 ENCOUNTER — ANTICOAGULATION THERAPY VISIT (OUTPATIENT)
Dept: PEDIATRICS | Facility: CLINIC | Age: 66
End: 2021-08-20

## 2021-08-20 DIAGNOSIS — I48.0 PAF (PAROXYSMAL ATRIAL FIBRILLATION) (H): ICD-10-CM

## 2021-08-20 DIAGNOSIS — Z95.2 AORTIC VALVE PROSTHESIS PRESENT: Primary | ICD-10-CM

## 2021-08-20 LAB — INR (EXTERNAL): 3.3 (ref 0.9–1.1)

## 2021-08-20 RX ORDER — MEMANTINE HYDROCHLORIDE 10 MG/1
TABLET ORAL
Qty: 180 TABLET | Refills: 1 | Status: SHIPPED | OUTPATIENT
Start: 2021-08-20 | End: 2021-11-15

## 2021-08-20 NOTE — TELEPHONE ENCOUNTER
Telephone call placed to patient, left a voice message with request for return call.    When patient calls back:    -What is the patient taking this med for?    -Is this medication that DY usually fills for him or does he need to request from previous/authorizing provider?    North Noel RN on 8/19/2021 at 4:09 PM    
  Was given in [facility].   Requesting a refill of Memantine.  Med is not on current active med list.     -Patient is not sure why he was prescribed this medication, or exactly when he was prescribed this medication, or what dose he was prescribed.     Dr. Ingram was previous PCP, but was not prescribing this med.   [facility] was the provider who ordered this last.     Patient believes Memantine was first prescribed and Last prescribed while inpatient at [facility] in either April, May, June, or July.      Patient reports that he will call back when he finds the pill bottle, or discharge summary with the information on the medication.     -Patient would like a call back tomorrow on 8/20/21 if he has not called back by then.     North Noel RN on 8/19/2021 at 4:46 PM        
Called patient.     Relayed below note from Dr. Boyle to the patient.   Patient verbalized understanding.     North Noel RN on 8/20/2021 at 5:11 PM    
Need more info - is this medication that DY usually fills for him or does he need to request from previous/authorizing provider?      Gasper Boyle MD    
Patient called back:    Per patient  Current prescription is for Memantine 10 mg, twice per day.   Likely for memory loss due to recent strokes.     Last Refilled 5/6/21 by Dr. Ingram, PCP, who will not be refilling this med.   Patient has changed PCP to Dr. Morgan.     Initially prescribed in the hospital early in this year.     Routing to Dr. Morgan to advise refill.    North Noel RN on 8/19/2021 at 5:08 PM    
Reason for Call:  Other prescription    Detailed comments: Pt states they have transferred care now to Dr. Morgan, and would like him to fill this Rx. It may have been missed at their most recent visit. Please contact if there are further questions/concerns.    Phone Number Patient can be reached at: Home number on file [phone] (home)    Best Time: Any    Can we leave a detailed message on this number? YES    Call taken on 8/17/2021 at 3:22 PM by Arleen Sanchez      
Refilling meds - will need to discuss this medication with Dr. Morgan at his upcoming physical in December.  Please advise.    Gasper Boyle MD    
Routing refill request to provider for review/approval because:  Drug not active on patient's medication list    Shreyas GARCIA RN        
unk

## 2021-08-20 NOTE — PROGRESS NOTES
ANTICOAGULATION MANAGEMENT     Todd S Aschoff 65 year old male is on warfarin with therapeutic INR result. (Goal INR 2.5-3.5)    Recent labs: (last 7 days)     08/20/21  1106   INR 3.3*       ASSESSMENT     Source(s): Chart Review and Home Care/Facility Nurse       Warfarin doses taken: Warfarin taken as instructed    Diet: No new diet changes identified    New illness, injury, or hospitalization: No    Medication/supplement changes: None noted    Signs or symptoms of bleeding or clotting: No    Previous INR: Therapeutic last 2(+) visits    Additional findings: None     PLAN     Recommended plan for no diet, medication or health factor changes affecting INR     Dosing Instructions: Continue your current warfarin dose with next INR in 1 week       Summary  As of 8/20/2021    Full warfarin instructions:  7.5 mg every Sun, Tue, Fri; 5 mg all other days   Next INR check:               Telephone call with home care nurse Clara who verbalizes understanding and agrees to plan    Orders given to  Homecare nurse/facility to recheck    Education provided: Please call back if any changes to your diet, medications or how you've been taking warfarin    Plan made per [facility] anticoagulation protocol    Clare Samuels, RN  Anticoagulation Clinic  8/20/2021    _______________________________________________________________________     Anticoagulation Episode Summary     Current INR goal:  2.5-3.5   TTR:  47.7 % (10.5 mo)   Target end date:  Indefinite   [facility]    Indications    Mechanical AV Replacement 1992 -- on Warfarin  [Z95.2]  PAF (paroxysmal atrial fibrillation) (H) [I48.0]  Long term current use of anticoagulant therapy (Resolved) [Z79.01]  Chronic atrial fibrillation (H) (Resolved) [I48.20]           Comments:           Anticoagulation Care Providers     Provider Role Specialty Phone number    Obdulio Ingram MD Referring Internal Medicine [phone]

## 2021-08-23 ENCOUNTER — TELEPHONE (OUTPATIENT)
Dept: INTERNAL MEDICINE | Facility: CLINIC | Age: 66
End: 2021-08-23

## 2021-08-23 NOTE — TELEPHONE ENCOUNTER
Fax received from [facility] - Physician Order 08/20/21 for review and signature.  Put in Dr. Ingram's

## 2021-08-26 DIAGNOSIS — Z95.2 AORTIC VALVE PROSTHESIS PRESENT: ICD-10-CM

## 2021-08-27 ENCOUNTER — HOSPITAL ENCOUNTER (EMERGENCY)
Facility: CLINIC | Age: 66
Discharge: HOME | End: 2021-08-27
Attending: EMERGENCY MEDICINE | Admitting: EMERGENCY MEDICINE
Payer: COMMERCIAL

## 2021-08-27 ENCOUNTER — ANTICOAGULATION THERAPY VISIT (OUTPATIENT)
Dept: PEDIATRICS | Facility: CLINIC | Age: 66
End: 2021-08-27

## 2021-08-27 VITALS
TEMPERATURE: 98.3 F | HEART RATE: 60 BPM | OXYGEN SATURATION: 98 % | RESPIRATION RATE: 18 BRPM | SYSTOLIC BLOOD PRESSURE: 149 MMHG | DIASTOLIC BLOOD PRESSURE: 103 MMHG

## 2021-08-27 DIAGNOSIS — Z95.2 AORTIC VALVE PROSTHESIS PRESENT: Primary | ICD-10-CM

## 2021-08-27 DIAGNOSIS — I48.0 PAF (PAROXYSMAL ATRIAL FIBRILLATION) (H): ICD-10-CM

## 2021-08-27 DIAGNOSIS — S31.31XA LACERATION OF SCROTUM, INITIAL ENCOUNTER: ICD-10-CM

## 2021-08-27 LAB
ANION GAP SERPL CALCULATED.3IONS-SCNC: 4 MMOL/L (ref 3–14)
BASOPHILS # BLD AUTO: 0.1 10E3/UL (ref 0–0.2)
BASOPHILS NFR BLD AUTO: 1 %
BUN SERPL-MCNC: 19 MG/DL (ref 7–30)
CALCIUM SERPL-MCNC: 8.3 MG/DL (ref 8.5–10.1)
CHLORIDE BLD-SCNC: 108 MMOL/L (ref 94–109)
CO2 SERPL-SCNC: 27 MMOL/L (ref 20–32)
CREAT SERPL-MCNC: 0.96 MG/DL (ref 0.66–1.25)
EOSINOPHIL # BLD AUTO: 0.1 10E3/UL (ref 0–0.7)
EOSINOPHIL NFR BLD AUTO: 1 %
ERYTHROCYTE [DISTWIDTH] IN BLOOD BY AUTOMATED COUNT: 14.6 % (ref 10–15)
GFR SERPL CREATININE-BSD FRML MDRD: 83 ML/MIN/1.73M2
GLUCOSE BLD-MCNC: 99 MG/DL (ref 70–99)
HCT VFR BLD AUTO: 43.3 % (ref 40–53)
HGB BLD-MCNC: 14.5 G/DL (ref 13.3–17.7)
IMM GRANULOCYTES # BLD: 0 10E3/UL
IMM GRANULOCYTES NFR BLD: 0 %
INR PPP: 2.48 (ref 0.85–1.15)
LYMPHOCYTES # BLD AUTO: 1.8 10E3/UL (ref 0.8–5.3)
LYMPHOCYTES NFR BLD AUTO: 23 %
MCH RBC QN AUTO: 30.9 PG (ref 26.5–33)
MCHC RBC AUTO-ENTMCNC: 33.5 G/DL (ref 31.5–36.5)
MCV RBC AUTO: 92 FL (ref 78–100)
MONOCYTES # BLD AUTO: 0.8 10E3/UL (ref 0–1.3)
MONOCYTES NFR BLD AUTO: 11 %
NEUTROPHILS # BLD AUTO: 4.9 10E3/UL (ref 1.6–8.3)
NEUTROPHILS NFR BLD AUTO: 64 %
NRBC # BLD AUTO: 0 10E3/UL
NRBC BLD AUTO-RTO: 0 /100
PLATELET # BLD AUTO: 220 10E3/UL (ref 150–450)
POTASSIUM BLD-SCNC: 3.9 MMOL/L (ref 3.4–5.3)
RBC # BLD AUTO: 4.7 10E6/UL (ref 4.4–5.9)
SODIUM SERPL-SCNC: 139 MMOL/L (ref 133–144)
WBC # BLD AUTO: 7.6 10E3/UL (ref 4–11)

## 2021-08-27 PROCEDURE — 85610 PROTHROMBIN TIME: CPT | Performed by: EMERGENCY MEDICINE

## 2021-08-27 PROCEDURE — 85025 COMPLETE CBC W/AUTO DIFF WBC: CPT | Performed by: EMERGENCY MEDICINE

## 2021-08-27 PROCEDURE — 80048 BASIC METABOLIC PNL TOTAL CA: CPT | Performed by: EMERGENCY MEDICINE

## 2021-08-27 PROCEDURE — 99283 EMERGENCY DEPT VISIT LOW MDM: CPT

## 2021-08-27 PROCEDURE — 36415 COLL VENOUS BLD VENIPUNCTURE: CPT | Performed by: EMERGENCY MEDICINE

## 2021-08-27 PROCEDURE — 12002 RPR S/N/AX/GEN/TRNK2.6-7.5CM: CPT

## 2021-08-27 RX ORDER — WARFARIN SODIUM 2.5 MG/1
TABLET ORAL
Qty: 200 TABLET | Refills: 0 | Status: ON HOLD | OUTPATIENT
Start: 2021-08-27 | End: 2022-01-18

## 2021-08-27 RX ORDER — CEPHALEXIN 500 MG/1
500 CAPSULE ORAL 2 TIMES DAILY
Qty: 14 CAPSULE | Refills: 0 | Status: SHIPPED | OUTPATIENT
Start: 2021-08-27 | End: 2021-09-03

## 2021-08-27 RX ORDER — LIDOCAINE HYDROCHLORIDE 10 MG/ML
INJECTION, SOLUTION EPIDURAL; INFILTRATION; INTRACAUDAL; PERINEURAL
Status: DISCONTINUED
Start: 2021-08-27 | End: 2021-08-27 | Stop reason: HOSPADM

## 2021-08-27 NOTE — PROGRESS NOTES
ANTICOAGULATION MANAGEMENT     Todd S Aschoff 65 year old male is on warfarin with therapeutic INR result. (Goal INR 2.5-3.5)    Recent labs: (last 7 days)     08/27/21  0859   INR 2.48*       ASSESSMENT     Source(s): Patient/Caregiver Call and Home Care/Facility Nurse       Warfarin doses taken: Warfarin taken as instructed    Diet: No new diet changes identified    New illness, injury, or hospitalization: Yes: Pt fell today and has a laceration on his scrotum. He did not hit his head. Pt presented to the ED and received a few stiches. He was started on the below abx.    Medication/supplement changes: Keflex 08/27-09/03    Signs or symptoms of bleeding or clotting: No    Previous INR: Therapeutic last 2(+) visits    Additional findings: None     PLAN     Recommended plan for temporary change(s) affecting INR     Dosing Instructions: Continue your current warfarin dose with next INR in 4 days       Summary  As of 8/27/2021    Full warfarin instructions:  7.5 mg every Sun, Tue, Fri; 5 mg all other days   Next INR check:  8/31/2021             Telephone call with home care nurse Clara who verbalizes understanding and agrees to plan    Orders given to  Homecare nurse/facility to recheck    Education provided: Goal range and significance of current result and Potential interaction between warfarin and Keflex    Plan made per [facility] anticoagulation protocol    Oscar Briscoe RN  Anticoagulation Clinic  8/27/2021    _______________________________________________________________________     Anticoagulation Episode Summary     Current INR goal:  2.5-3.5   TTR:  49.9 % (10.5 mo)   Target end date:  Indefinite   Send INR reminders to:  [facility]    Indications    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

## 2021-08-27 NOTE — ED TRIAGE NOTES
Pt got up to use the bathroom this morning at 0300 and reports he fell asleep and fell off the toilet. Denies hitting head. Pt was able to get back into bed. When he woke up this morning, he noticed bleeding coming from his scrotum and is now having pain there. ABCs intact.

## 2021-08-27 NOTE — TELEPHONE ENCOUNTER
Warfarin - Prescription approved per [facility] Refill Protocol.    Susan Gamboa RN   [facility]tico[facility]   [facility]

## 2021-08-27 NOTE — ED PROVIDER NOTES
History   Chief Complaint:  Penis/Scrotum Problem       HPI   Todd S Aschoff is a 65 year old male who presents for evaluation of a scrotal laceration.  He fell off the toilet approximately 3 AM this morning.  He was not aware that he lacerated his scrotum at that time however, he later noted some bleeding in his depends and noted the laceration.  He has some pain but no significant pain in the testicles.  Did not hit his head and had no loss of consciousness.  He has no other injuries.  He is on Coumadin for a mechanical heart valve.    Review of Systems   Constitutional: Negative for chills and fever.   Respiratory: Negative for shortness of breath.    Cardiovascular: Negative for chest pain.   Gastrointestinal: Negative for abdominal pain.   Skin: Positive for wound.   Neurological: Negative for syncope and headaches.   All other systems reviewed and are negative.        Allergies:  Gabapentin    Medications:  acetaminophen (TYLENOL) 500 MG tablet  aspirin (ASA) 81 MG chewable tablet  cetirizine (ZYRTEC) 10 MG tablet  cyclobenzaprine (FLEXERIL) 10 MG tablet  diazepam (VALIUM) 5 MG tablet  donepezil (ARICEPT) 10 MG tablet  folic acid (FOLVITE) 1 MG tablet  guanFACINE (TENEX) 1 MG tablet  lamoTRIgine (LAMICTAL) 25 MG tablet  levothyroxine (SYNTHROID/LEVOTHROID) 150 MCG tablet  liothyronine (CYTOMEL) 25 MCG tablet  memantine (NAMENDA) 10 MG tablet  mirabegron (MYRBETRIQ) 50 MG 24 hr tablet  pregabalin (LYRICA) 100 MG capsule  propafenone (RYTHMOL) 150 MG TABS tablet  venlafaxine (EFFEXOR-XR) 75 MG 24 hr capsule  warfarin ANTICOAGULANT (COUMADIN) 2.5 MG tablet  warfarin ANTICOAGULANT (COUMADIN) 2.5 MG tablet        Past Medical History:    Past Medical History:   Diagnosis Date     Advanced directives, counseling/discussion 1/6/2012     Alcohol dependence (H)      Alcohol dependence in remission (H) 8/2/2018     Allergy, unspecified not elsewhere classified      Anemia, unspecified type 1/22/2021     Aortic valve  prosthesis present 2/8/2021     Atrial fibrillation (H)      Benign prostatic hyperplasia 2/8/2021     Bilateral thoracic back pain 11/16/2016     BPH (benign prostatic hypertrophy)      Chronic atrial fibrillation (H) 8/7/2002     Chronic infection      Chronic low back pain 8/19/2011     Chronic pain      Depressive disorder 6/9/2010     Dissection of aorta, thoracic (H) 1992     Elevated prostate specific antigen (PSA) 1/22/2020     Family history of prostate cancer 1/22/2020     Generalized muscle weakness 1/22/2021     Headache(784.0)      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     History of spinal cord injury      Hyperlipidemia LDL goal <130 10/31/2010     Hypotension, unspecified hypotension type 1/22/2021     Hypothyroidism 8/7/2002     Leg wound, left 1/9/2021     Long term current use of anticoagulant therapy 8/7/2002     Low back pain      Lumbar radiculopathy 4/3/2013     Lumbar surgical wound fluid collection 5/23/2013     Major depression      Memory difficulties 1/16/2015     Mixed hyperlipidemia      Morbid obesity (H) 4/20/2010     Numbness and tingling      OA (osteoarthritis)      OAB (overactive bladder) 5/11/2015     Obesity, unspecified      Persons encountering health services in other specified circumstances 4/3/2012     Polypharmacy 1/22/2021     Prostate infection      Radiculitis 4/20/2010     Recurrent major depressive episodes, in full remission (H) 10/30/2012     Renal insufficiency 1/22/2021     Rotator cuff tear 1/26/2015     S/P lumbar fusion 4/5/2018     S/P 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 04/18/2021     Priority: Medium     Polypharmacy 01/22/2021     Priority: Medium     Renal insufficiency 01/22/2021     Priority: Medium     Hypotension, unspecified hypotension type 01/22/2021     Priority: Medium     Anemia, unspecified type 01/22/2021     Priority: Medium     Leg wound, left 01/09/2021     Priority: Medium     Hematoma from fall, followed by infection and skin graft       Suicide attempt by multiple drug overdose, initial encounter (H) 11/27/2020     Priority: Medium     Depression, unspecified depression type 11/27/2020     Priority: Medium     Elevated prostate specific antigen (PSA) 01/22/2020     Priority: Medium     Alcohol dependence in remission (H) 08/02/2018     Priority: Medium     S/P lumbar fusion 04/05/2018     Priority: Medium     Spinal stenosis of lumbar region 04/05/2018     Priority: Medium     Obesity, BMI >35 with comorbidities 04/28/2017     Priority: Medium     Bilateral thoracic back pain 11/16/2016     Priority: Medium     OAB (overactive bladder) 05/11/2015     Priority: Medium     Rotator cuff tear 01/26/2015     Priority: Medium     Overview:   Rotator cuff tear, right  Formatting of this note might be different from the original.  Rotator cuff tear, right       Memory difficulties 01/16/2015     Priority: Medium     Hx of Thoracic Dissection repair -- 1992 04/22/2013     Priority: Medium     Lumbar radiculopathy 04/03/2013     Priority: Medium     Obesity 03/25/2013     Priority: Medium     Tourette syndrome 10/30/2012     Priority: Medium     Chronic low back pain 08/19/2011     Priority: Medium     BPH (benign prostatic hypertrophy)      Priority: Medium     Follows with urology.        HYPERLIPIDEMIA LDL GOAL <130 10/31/2010     Priority: Medium     Mechanical AV Replacement 1992 -- on Warfarin       Priority: Medium     Radiculitis 04/20/2010     Priority: Medium      Overview:   LW Modifier:  Right foot, s/p R AMANDA  LW Onset:  2002  ; Neuralgia  Formatting of this note might be different from the original.  LW Modifier:  Right foot, s/p R AMANDA  LW Onset:  2002  ; Neuralgia       PAF (paroxysmal atrial fibrillation) (H) 08/07/2002     Priority: Medium     Hypothyroidism 08/07/2002     Priority: Medium     Problem list name updated by automated process. Provider to review          Past Surgical History:    Past Surgical History:   Procedure Laterality Date     AORTIC VALVE REPLACEMENT  1992    [employer] F's valve     APPLY WOUND VAC Left 1/26/2021    Procedure: Placement of negative pressure wound therapy 2,400 squared centimeters  ;  Surgeon: Lazaro Plascencia MD;  Location: [facility] OR     C TOTAL HIP ARTHROPLASTY  2010    Left AMANDA     C TOTAL HIP ARTHROPLASTY  2002    R hip replacement comp's by nerve injury with pain down into R leg, nadya below knee     CATARACT IOL, RT/LT      rt eye only     CHOLECYSTECTOMY, LAPOROSCOPIC  8/10     CLOSE SECONDARY WOUND LOWER EXTREMITY Left 2/3/2021    Procedure:  Split Thickness Skin Graft to Leg of 18 square centimeters  Intermediate Closure of Leg of 8cm   ;  Surgeon: Lazaro Plascencia MD;  Location:  OR     COLONOSCOPY N/A 1/15/2015    Procedure: COLONOSCOPY;  Surgeon: Johnson Kothari MD;  Location:  GI     DECOMPRESSION LUMBAR ONE LEVEL  4/3/2013    Procedure: DECOMPRESSION LUMBAR ONE LEVEL;  Open Decompression L3-4 bilateral;  Surgeon: Travis Coffey MD;  Location: [facility] OR     DECOMPRESSION, FUSION CERVICAL ANTERIOR ONE LEVEL, COMBINED  3/23/2012    Procedure:COMBINED DECOMPRESSION, FUSION CERVICAL ANTERIOR ONE LEVEL; Anterior Cervical Decompression and Fusion C4-6; Surgeon:TRAVIS COFFEY; Location: OR     ELBOW SURGERY       EXPLORE SPINE, REMOVE HARDWARE, COMBINED  5/23/2013    Procedure: COMBINED EXPLORE SPINE, REMOVE HARDWARE;  Exploration Lumbar Wound for fluid collection;  Surgeon: Travis Coffey MD;  Location:  OR      FUSION CERVICAL ANTERIOR TWO LEVELS  3/26/2012    Procedure:FUSION CERVICAL ANTERIOR TWO LEVELS; Anterior Cervical Fusion C4-6, Anterior Cervical  Hematoma Evacuation; Surgeon:TRAVIS COFFEY; Location:[facility] OR     IR LUMBAR EPIDURAL STEROID INJECTION  3/28/2003     IRRIGATION AND DEBRIDEMENT LOWER EXTREMITY, COMBINED Left 1/10/2021    Procedure: 1.  Irrigation and excisional debridement to muscle left distal medial calf chronic wounds total area measuring 9 cm x 4 cm 2.  Irrigation and excisional debridement to fascia left medial calf chronic hematoma measuring 29 x 6.7 x 4.2 cm 3.  Primary complex wound closure left medial distal calf 9 cm in length;  Surgeon: Rod Kemp MD;  Location: [facility] OR     IRRIGATION AND DEBRIDEMENT LOWER EXTREMITY, COMBINED Left 2021    Procedure: Evacuation of hematoma debridement of skin, subcutaneous tissue and muscle 2,400 squared centimeters, placement of negative pressure wound therapy 2,400 squared centimeters;  Surgeon: Lazaro Plascencia MD;  Location: [facility] OR     IRRIGATION AND DEBRIDEMENT SPINE, CLOSE WOUND, COMBINED  3/26/2012    Procedure:COMBINED IRRIGATION AND DEBRIDEMENT SPINE, CLOSE WOUND; Surgeon:TRAVIS COFFEY; Location:[facility] OR     OTHER SURGICAL HISTORY      ID UNLISTED ORBIT     OTHER SURGICAL HISTORY      ID UNLISTED SPINE     OTHER SURGICAL HISTORY      ID UNLISTED NECK/THORAX     OTHER SURGICAL HISTORY      (IA) ID TOTAL HIP ARTHROPLASTY     OTHER SURGICAL HISTORY      (IA) ID NEE SCOPE MED W LAT MENISCECT WWO DEBRIBE/SHAVE ANY CO     removal of cyst of back   2.5week     TONSILLECTOMY       wisdom teeth[       ZZ NONSPECIFIC PROCEDURE      repair of TAA with graft        Family History:    Family History   Problem Relation Age of Onset     Cerebrovascular Disease Father          age 86, had M.I. ,diabetic also     Prostate Cancer Father      Cancer Mother          age 83 of dementia, also h/o lymphoma     Hypertension Brother         2  brothers with hypertension & sleep apnea     Hypertension Sister         Born ~1936     Sleep Apnea Brother          age 78, Alzheimers     No Known Problems Son      No Known Problems Daughter      No Known Problems Son      Family History Negative Brother         Had 5 brothers, three still alive as of      Colon Cancer No family hx of      Diabetes Mother      Diabetes Father        Social History:  Lives at home with wife, children, and grandchildren.    Physical Exam     Patient Vitals for the past 24 hrs:   BP Temp Temp src Pulse Resp SpO2   21 0831 (!) 137/106 98.3  F (36.8  C) Oral 76 18 96 %       Physical Exam  Constitutional: Well appearing.  HEENT: Atraumatic.  Moist mucous membranes.  Neck: Soft.  Supple.    Cardiac: Regular rate and rhythm.  No murmur or rub.  Respiratory: Clear to auscultation bilaterally.  No respiratory distress.   Abdomen: Soft and nontender.   Nondistended.  : There is a 6 cm laceration that runs transversely along the left mid scrotum.  There is some gaping and widening in the mid laceration with less on the hands.  There is some injury and involvement of the initial fascial layers but does not go any deeper.  There is no evidence of penetration into the scrotum.  Minimal oozing of blood.  Musculoskeletal: No edema.  Normal range of motion.  Neurologic: Alert and oriented x3.  Normal tone and bulk.   Normal gait.  Skin: No rashes.  No edema.  Psych: Normal affect.  Normal behavior.        Emergency Department Course      Laboratory:  Labs Ordered and Resulted from Time of ED Arrival Up to the Time of Departure from the ED   BASIC METABOLIC PANEL - Abnormal; Notable for the following components:       Result Value    Calcium 8.3 (*)     All other components within normal limits   INR - Abnormal; Notable for the following components:    INR 2.48 (*)     All other components within normal limits   CBC WITH PLATELETS & DIFFERENTIAL    Narrative:     The following  orders were created for panel order CBC with platelets differential.  Procedure                               Abnormality         Status                     ---------                               -----------         ------                     CBC with platelets and d...[[id number]]                      Final result                 Please view results for these tests on the individual orders.   CBC WITH PLATELETS AND DIFFERENTIAL         [facility]    -Laceration Repair    Date/Time: 8/27/2021 10:00 AM  Performed by: Umesh Burrell MD  Authorized by: Umesh Burrell MD       ANESTHESIA (see MAR for exact dosages):     Anesthesia method:  Local infiltration    Local anesthetic:  Lidocaine 1% w/o epi  LACERATION DETAILS     Location:  Anogenital    Anogenital location:  Scrotum    Length (cm):  6    REPAIR TYPE:     Repair type:  Simple      EXPLORATION:     Hemostasis achieved with:  Direct pressure    Wound exploration: wound explored through full range of motion and entire depth of wound probed and visualized      Wound extent: fascia violated      Wound extent: no foreign body, no signs of injury, no nerve damage, no underlying fracture and no vascular damage      Contaminated: no      TREATMENT:     Area cleansed with:  Saline    Amount of cleaning:  Extensive    Irrigation solution:  Sterile water and sterile saline    Irrigation method:  Syringe    SKIN REPAIR     Repair method:  Sutures    Suture size:  4-0    Wound skin closure material used: Vicryl Rapide.    Suture technique:  Simple interrupted    Number of sutures:  21    APPROXIMATION     Approximation:  Loose    POST-PROCEDURE DETAILS     Dressing:  Antibiotic ointment and bulky dressing      PROCEDURE   Patient Tolerance:  Patient tolerated the procedure well with no immediate complications            Emergency Department Course:        Disposition:  The patient was discharged to home.       Impression & Plan   Medical  Decision Making:  Todd S Aschoff is a 65-year-old man who is afebrile and hemodynamically stable.  He has a laceration to the left scrotum.  The mid laceration is the deepest and involves some of the early fascial layers, however, there is no penetration into the scrotum.  He has no tenderness of the testicle.  I spoke with urology who recommends closure at the bedside with absorbable suture.  This was performed with extensive irrigation to prevent infection.  His tetanus is up-to-date.  I will place him on Keflex infection prophylaxis.  He has a urologist and will follow up with them.  I encouraged him to make a call to them when the clinic opens next week and follow-up closely and he is understanding.  We discussed wound care management and supportive care at home.  Return precautions were given and he was in no distress at time of discharge.    Diagnosis:    ICD-10-CM    1. Laceration of scrotum, initial encounter  S31.31XA        Discharge Medications:  Discharge Medication List as of 8/27/2021  1:04 PM      START taking these medications    Details   cephALEXin (KEFLEX) 500 MG capsule Take 1 capsule (500 mg) by mouth 2 times daily for 7 days, Disp-14 capsule, R-0, E-Prescribe                      Umesh Burrell MD  08/29/21 [id number]

## 2021-08-29 ASSESSMENT — ENCOUNTER SYMPTOMS
WOUND: 1
CHILLS: 0
FEVER: 0
SHORTNESS OF BREATH: 0
HEADACHES: 0
ABDOMINAL PAIN: 0

## 2021-08-30 ENCOUNTER — TELEPHONE (OUTPATIENT)
Dept: UROLOGY | Facility: CLINIC | Age: 66
End: 2021-08-30

## 2021-08-30 NOTE — TELEPHONE ENCOUNTER
Patient called nurse line and spoke with this nurse. He informed me that he recently had a fall and split open his scrotum and had 27 stitches. They informed patient he should follow-up with Urology. Will send message to MD for further instructions.     Millie Andrew LPN

## 2021-08-31 ENCOUNTER — MEDICAL CORRESPONDENCE (OUTPATIENT)
Dept: HEALTH INFORMATION MANAGEMENT | Facility: CLINIC | Age: 66
End: 2021-08-31

## 2021-08-31 ENCOUNTER — ANTICOAGULATION THERAPY VISIT (OUTPATIENT)
Dept: ANTICOAGULATION | Facility: CLINIC | Age: 66
End: 2021-08-31

## 2021-08-31 DIAGNOSIS — I48.0 PAF (PAROXYSMAL ATRIAL FIBRILLATION) (H): ICD-10-CM

## 2021-08-31 DIAGNOSIS — Z95.2 AORTIC VALVE PROSTHESIS PRESENT: Primary | ICD-10-CM

## 2021-08-31 LAB — INR (EXTERNAL): 3.4 (ref 0.8–1.1)

## 2021-08-31 NOTE — TELEPHONE ENCOUNTER
Signed form faxed  Mahesh Saavedra , [facility]  08/31/21 10:28 AM

## 2021-08-31 NOTE — PROGRESS NOTES
ANTICOAGULATION MANAGEMENT     Todd S Aschoff 65 year old male is on warfarin with therapeutic INR result. (Goal INR 2.5-3.5)    Recent labs: (last 7 days)     08/31/21  1426   INR 3.4*       ASSESSMENT     Source(s): Chart Review and Home Care/Facility Nurse       Warfarin doses taken: Warfarin taken as instructed    Diet: No new diet changes identified    New illness, injury, or hospitalization: No    Medication/supplement changes: Continues cephalexin until 9/3 and is using tylenol PRN    Signs or symptoms of bleeding or clotting: Yes: has scant bleeding from suture site    Previous INR: Subtherapeutic    Additional findings: None     PLAN     Recommended plan for no diet, medication or health factor changes affecting INR     Dosing Instructions: Continue your current warfarin dose with next INR in 1 week       Summary  As of 8/31/2021    Full warfarin instructions:  7.5 mg every Sun, Tue, Fri; 5 mg all other days   Next INR check:  9/7/2021             Telephone call with home care nurse Clara who verbalizes understanding and agrees to plan    Orders given to  Homecare nurse/facility to recheck    Education provided: Please call back if any changes to your diet, medications or how you've been taking warfarin    Plan made per [facility] anticoagulation protocol    Tristan Gamboa RN  Anticoagulation Clinic  8/31/2021    _______________________________________________________________________     Anticoagulation Episode Summary     Current INR goal:  2.5-3.5   TTR:  51.2 % (10.5 mo)   Target end date:  Indefinite   Send INR reminders to:  AIDA MCWILLIAMS    Indications    Mechanical AV Replacement 1992 -- on Warfarin  [Z95.2]  PAF (paroxysmal atrial fibrillation) (H) [I48.0]  Long term current use of anticoagulant therapy (Resolved) [Z79.01]  Chronic atrial fibrillation (H) (Resolved) [I48.20]           Comments:           Anticoagulation Care Providers     Provider Role Specialty Phone number    Obdulio Ingram  MD [facility] Internal Medicine [phone]

## 2021-08-31 NOTE — TELEPHONE ENCOUNTER
"Per MD- \"He can follow up with me as scheduled at the end of September after his Urodynamics (UDS). I couldn't seen that the Urodynamics (UDS) is scheduled - so let's get that scheduled before the visit.\"    Patient already scheduled for both.   Called patient and LM.     Millie Andrew LPN    "

## 2021-09-02 ENCOUNTER — TELEPHONE (OUTPATIENT)
Dept: INTERNAL MEDICINE | Facility: CLINIC | Age: 66
End: 2021-09-02

## 2021-09-03 DIAGNOSIS — M62.830 BACK MUSCLE SPASM: ICD-10-CM

## 2021-09-03 DIAGNOSIS — M54.6 CHRONIC BILATERAL THORACIC BACK PAIN: ICD-10-CM

## 2021-09-03 DIAGNOSIS — G89.29 CHRONIC BILATERAL THORACIC BACK PAIN: ICD-10-CM

## 2021-09-03 RX ORDER — DIAZEPAM 5 MG
5 TABLET ORAL
Qty: 30 TABLET | Refills: 0 | Status: SHIPPED | OUTPATIENT
Start: 2021-09-03 | End: 2021-10-04

## 2021-09-04 ENCOUNTER — HEALTH MAINTENANCE LETTER (OUTPATIENT)
Age: 66
End: 2021-09-04

## 2021-09-07 ENCOUNTER — TELEPHONE (OUTPATIENT)
Dept: INTERNAL MEDICINE | Facility: CLINIC | Age: 66
End: 2021-09-07

## 2021-09-07 ENCOUNTER — ANTICOAGULATION THERAPY VISIT (OUTPATIENT)
Dept: PEDIATRICS | Facility: CLINIC | Age: 66
End: 2021-09-07

## 2021-09-07 DIAGNOSIS — Z95.2 AORTIC VALVE PROSTHESIS PRESENT: Primary | ICD-10-CM

## 2021-09-07 DIAGNOSIS — I48.0 PAF (PAROXYSMAL ATRIAL FIBRILLATION) (H): ICD-10-CM

## 2021-09-07 LAB — INR (EXTERNAL): 3.9 (ref 2.5–3.5)

## 2021-09-07 NOTE — TELEPHONE ENCOUNTER
Vera from [facility] care calling for verbal orders for skilled nursing  1 time a week for 9 weeks  Ok to call and lm [phone]

## 2021-09-07 NOTE — PROGRESS NOTES
ANTICOAGULATION MANAGEMENT     Todd S Aschoff 65 year old male is on warfarin with supratherapeutic INR result. (Goal INR 2.5-3.5)    Recent labs: (last 7 days)     09/07/21  1324   INR 3.9*       ASSESSMENT     Source(s): Chart Review and Home Care/Facility Nurse       Warfarin doses taken: Warfarin taken as instructed    Diet: No new diet changes identified    New illness, injury, or hospitalization: No    Medication/supplement changes: Keflex completed on 9/3    Signs or symptoms of bleeding or clotting: none reported    Previous INR: Therapeutic last visit; previously outside of goal range    Additional findings: None     PLAN     Recommended plan for temporary change(s) affecting INR     Dosing Instructions: Partial hold then continue your current warfarin dose with next INR in 1 week       Summary  As of 9/7/2021    Full warfarin instructions:  7.5 mg every Sun, Tue, Fri; 5 mg all other days   Next INR check:               Detailed voice message left for home care nurse Vera [phone] with dosing instructions and follow up date.     Orders given to  Homecare nurse/facility to recheck    Education provided: Please call back if any changes to your diet, medications or how you've been taking warfarin    Plan made per [facility] anticoagulation protocol    Amanda Umanzor, RN  Anticoagulation Clinic  9/7/2021    _______________________________________________________________________     Anticoagulation Episode Summary     Current INR goal:  2.5-3.5   TTR:  50.2 % (10.5 mo)   Target end date:  Indefinite   Send INR reminders to:  [facility]    Indications    Mechanical AV Replacement 1992 -- on Warfarin  [Z95.2]  PAF (paroxysmal atrial fibrillation) (H) [I48.0]  Long term current use of anticoagulant therapy (Resolved) [Z79.01]  Chronic atrial fibrillation (H) (Resolved) [I48.20]           Comments:           Anticoagulation Care Providers     Provider Role Specialty Phone number    Obdulio Ingram MD Referring  Internal Medicine [phone]

## 2021-09-08 ENCOUNTER — MEDICAL CORRESPONDENCE (OUTPATIENT)
Dept: HEALTH INFORMATION MANAGEMENT | Facility: CLINIC | Age: 66
End: 2021-09-08

## 2021-09-09 ENCOUNTER — MEDICAL CORRESPONDENCE (OUTPATIENT)
Dept: HEALTH INFORMATION MANAGEMENT | Facility: CLINIC | Age: 66
End: 2021-09-09

## 2021-09-10 NOTE — TELEPHONE ENCOUNTER
Patient called to request Valium to be sent to pharmacy, yet it was already sent on 09/03/2021. Ana Gonzalez, TC on 9/10/2021 at 2:33 PM

## 2021-09-14 ENCOUNTER — TELEPHONE (OUTPATIENT)
Dept: INTERNAL MEDICINE | Facility: CLINIC | Age: 66
End: 2021-09-14

## 2021-09-14 ENCOUNTER — ANTICOAGULATION THERAPY VISIT (OUTPATIENT)
Dept: PEDIATRICS | Facility: CLINIC | Age: 66
End: 2021-09-14

## 2021-09-14 DIAGNOSIS — Z95.2 AORTIC VALVE PROSTHESIS PRESENT: Primary | ICD-10-CM

## 2021-09-14 DIAGNOSIS — I48.0 PAF (PAROXYSMAL ATRIAL FIBRILLATION) (H): ICD-10-CM

## 2021-09-14 LAB — INR (EXTERNAL): 3.1 (ref 0.9–1.1)

## 2021-09-14 NOTE — PROGRESS NOTES
ANTICOAGULATION MANAGEMENT     Todd S Aschoff 65 year old male is on warfarin with therapeutic INR result. (Goal INR 2.5-3.5)    Recent labs: (last 7 days)     09/14/21  1216   INR 3.1*       ASSESSMENT     Source(s): Home Care/Facility Nurse       Warfarin doses taken: Warfarin taken as instructed    Diet: No new diet changes identified    New illness, injury, or hospitalization: No    Medication/supplement changes: None noted    Signs or symptoms of bleeding or clotting: No    Previous INR: Supratherapeutic    Additional findings: None     PLAN     Recommended plan for no diet, medication or health factor changes affecting INR     Dosing Instructions: Continue your current warfarin dose with next INR in 2 weeks       Summary  As of 9/14/2021    Full warfarin instructions:  7.5 mg every Sun, Tue, Fri; 5 mg all other days   Next INR check:  9/28/2021             Telephone call with home care nurse Libby who verbalizes understanding and agrees to plan    Orders given to  Homecare nurse/facility to recheck    Education provided: Goal range and significance of current result    Plan made per [facility] anticoagulation protocol    Oscar Briscoe RN  Anticoagulation Clinic  9/14/2021    _______________________________________________________________________     Anticoagulation Episode Summary     Current INR goal:  2.5-3.5   TTR:  49.4 % (10.5 mo)   Target end date:  Indefinite   Send INR reminders to:  AIDA MCWILLIAMS    Indications    Mechanical AV Replacement 1992 -- on Warfarin  [Z95.2]  PAF (paroxysmal atrial fibrillation) (H) [I48.0]  Long term current use of anticoagulant therapy (Resolved) [Z79.01]  Chronic atrial fibrillation (H) (Resolved) [I48.20]           Comments:           Anticoagulation Care Providers     Provider Role Specialty Phone number    Obdulio Ingram MD Referring Internal Medicine [phone]

## 2021-09-15 ENCOUNTER — TELEPHONE (OUTPATIENT)
Dept: INTERNAL MEDICINE | Facility: CLINIC | Age: 66
End: 2021-09-15

## 2021-09-20 ENCOUNTER — MEDICAL CORRESPONDENCE (OUTPATIENT)
Dept: HEALTH INFORMATION MANAGEMENT | Facility: CLINIC | Age: 66
End: 2021-09-20
Payer: MEDICARE

## 2021-09-22 ENCOUNTER — ANCILLARY PROCEDURE (OUTPATIENT)
Dept: RADIOLOGY | Facility: AMBULATORY SURGERY CENTER | Age: 66
End: 2021-09-22
Attending: PHYSICIAN ASSISTANT
Payer: COMMERCIAL

## 2021-09-22 ENCOUNTER — ALLIED HEALTH/NURSE VISIT (OUTPATIENT)
Dept: UROLOGY | Facility: CLINIC | Age: 66
End: 2021-09-22
Payer: COMMERCIAL

## 2021-09-22 VITALS
DIASTOLIC BLOOD PRESSURE: 87 MMHG | HEIGHT: 73 IN | SYSTOLIC BLOOD PRESSURE: 158 MMHG | WEIGHT: 308 LBS | HEART RATE: 88 BPM | BODY MASS INDEX: 40.82 KG/M2

## 2021-09-22 DIAGNOSIS — N32.81 OAB (OVERACTIVE BLADDER): ICD-10-CM

## 2021-09-22 DIAGNOSIS — R39.9 LOWER URINARY TRACT SYMPTOMS (LUTS): ICD-10-CM

## 2021-09-22 DIAGNOSIS — N40.1 BENIGN PROSTATIC HYPERPLASIA WITH URINARY FREQUENCY: Primary | ICD-10-CM

## 2021-09-22 DIAGNOSIS — R35.0 BENIGN PROSTATIC HYPERPLASIA WITH URINARY FREQUENCY: Primary | ICD-10-CM

## 2021-09-22 LAB
ALBUMIN UR-MCNC: NEGATIVE MG/DL
APPEARANCE UR: CLEAR
BILIRUB UR QL STRIP: NEGATIVE
COLOR UR AUTO: YELLOW
GLUCOSE UR STRIP-MCNC: NEGATIVE MG/DL
HGB UR QL STRIP: ABNORMAL
KETONES UR STRIP-MCNC: NEGATIVE MG/DL
LEUKOCYTE ESTERASE UR QL STRIP: NEGATIVE
NITRATE UR QL: NEGATIVE
PH UR STRIP: 5 [PH] (ref 5–8)
SP GR UR STRIP: >1.03 (ref 1–1.03)
UROBILINOGEN UR STRIP-ACNC: 0.2 E.U./DL

## 2021-09-22 PROCEDURE — 81003 URINALYSIS AUTO W/O SCOPE: CPT | Performed by: PHYSICIAN ASSISTANT

## 2021-09-22 PROCEDURE — 74455 X-RAY URETHRA/BLADDER: CPT | Performed by: PHYSICIAN ASSISTANT

## 2021-09-22 PROCEDURE — 51728 CYSTOMETROGRAM W/VP: CPT | Performed by: PHYSICIAN ASSISTANT

## 2021-09-22 PROCEDURE — 51741 ELECTRO-UROFLOWMETRY FIRST: CPT | Performed by: PHYSICIAN ASSISTANT

## 2021-09-22 PROCEDURE — 51600 INJECTION FOR BLADDER X-RAY: CPT | Performed by: PHYSICIAN ASSISTANT

## 2021-09-22 PROCEDURE — 51797 INTRAABDOMINAL PRESSURE TEST: CPT | Performed by: PHYSICIAN ASSISTANT

## 2021-09-22 PROCEDURE — 51784 ANAL/URINARY MUSCLE STUDY: CPT | Performed by: PHYSICIAN ASSISTANT

## 2021-09-22 PROCEDURE — 99207 PR NO CHARGE INJECTABLE MED/DRUG: CPT | Performed by: PHYSICIAN ASSISTANT

## 2021-09-22 RX ORDER — SULFAMETHOXAZOLE/TRIMETHOPRIM 800-160 MG
1 TABLET ORAL ONCE
Status: COMPLETED | OUTPATIENT
Start: 2021-09-22 | End: 2021-09-22

## 2021-09-22 RX ADMIN — SULFAMETHOXAZOLE AND TRIMETHOPRIM 1 TABLET: 800; 160 TABLET ORAL at 15:33

## 2021-09-22 ASSESSMENT — MIFFLIN-ST. JEOR: SCORE: 2244.92

## 2021-09-22 ASSESSMENT — PAIN SCALES - GENERAL: PAINLEVEL: NO PAIN (0)

## 2021-09-22 NOTE — PATIENT INSTRUCTIONS
Follow up with Dr. Andersen as scheduled.    It was a pleasure meeting with you today.  Thank you for allowing me and my team the privilege of caring for you today.  YOU are the reason we are here, and I truly hope we provided you with the excellent service you deserve.  Please let us know if there is anything else we can do for you so that we can be sure you are leaving completely satisfied with your care experience.        Yadi Agosto, CMA

## 2021-09-22 NOTE — NURSING NOTE
"  Chief Complaint   Patient presents with     Urodynamics Study     BPH/LUTS/OAB       Blood pressure (!) 158/87, pulse 88, height 1.861 m (6' 1.25\"), weight 139.7 kg (308 lb). Body mass index is 40.36 kg/m .    Patient Active Problem List   Diagnosis     PAF (paroxysmal atrial fibrillation) (H)     Hypothyroidism     Mechanical AV Replacement 1992 -- on Warfarin      HYPERLIPIDEMIA LDL GOAL <130     BPH (benign prostatic hypertrophy)     Chronic low back pain     Tourette syndrome     Obesity     Lumbar radiculopathy     Hx of Thoracic Dissection repair -- 1992     Memory difficulties     OAB (overactive bladder)     Bilateral thoracic back pain     Obesity, BMI >35 with comorbidities     Alcohol dependence in remission (H)     Elevated prostate specific antigen (PSA)     Suicide attempt by multiple drug overdose, initial encounter (H)     Depression, unspecified depression type     Leg wound, left     Polypharmacy     Renal insufficiency     Hypotension, unspecified hypotension type     Anemia, unspecified type     Radiculitis     Rotator cuff tear     S/P lumbar fusion     Spinal stenosis of lumbar region     Hx of CVA x 4, 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     Left Asad-Paresthesias     Spells of decreased attentiveness       Allergies   Allergen Reactions     Gabapentin      Severe behavioral disturbances       Current Outpatient Medications   Medication Sig Dispense Refill     acetaminophen (TYLENOL) 500 MG tablet Take 500-1,000 mg by mouth 2 times daily       aspirin (ASA) 81 MG chewable tablet Take 81 mg by mouth daily       cetirizine (ZYRTEC) 10 MG tablet Take 10 mg by mouth every morning       cyclobenzaprine (FLEXERIL) 10 MG tablet TAKE ONE TABLET BY MOUTH THREE TIMES DAILY AS NEEDED FOR MUSCLE SPASM. 270 tablet 0     diazepam (VALIUM) 5 MG tablet Take 1 tablet (5 mg) by mouth nightly as needed for anxiety 30 tablet 0     donepezil (ARICEPT) 10 MG tablet Take 10 mg by mouth At Bedtime       folic acid (FOLVITE) 1 MG " tablet Take 5 tablets (5 mg) by mouth daily 150 tablet 3     guanFACINE (TENEX) 1 MG tablet Take 1 tablet (1 mg) by mouth At Bedtime 90 tablet 0     lamoTRIgine (LAMICTAL) 25 MG tablet Take 25 mg by mouth 2 times daily        levothyroxine (SYNTHROID/LEVOTHROID) 150 MCG tablet Take 1 tablet (150 mcg) by mouth daily 90 tablet 3     liothyronine (CYTOMEL) 25 MCG tablet Take 1 tablet (25 mcg) by mouth daily 30 tablet 3     memantine (NAMENDA) 10 MG tablet TAKE ONE TABLET BY MOUTH TWICE DAILY. 180 tablet 1     mirabegron (MYRBETRIQ) 50 MG 24 hr tablet Take 50 mg by mouth every evening       pregabalin (LYRICA) 100 MG capsule Take 1 capsule (100 mg) by mouth 3 times daily Do not take if sleepy/sedated. 270 capsule 3     propafenone (RYTHMOL) 150 MG TABS tablet Take 1 tablet (150 mg) by mouth every 8 hours 270 tablet 1     venlafaxine (EFFEXOR-XR) 75 MG 24 hr capsule Take 3 capsules (225 mg) by mouth daily 270 capsule 1     warfarin ANTICOAGULANT (COUMADIN) 2.5 MG tablet Take 7.5 mg (3 tabs) every Sun, Tue, Fri; 5 mg (2 tabs) all other days or as directed by the Anticoagulation Clinic 200 tablet 0     warfarin ANTICOAGULANT (COUMADIN) 2.5 MG tablet Take 5 mg by mouth daily Sun, Wed, Fri         Social History     Tobacco Use     Smoking status: Never Smoker     Smokeless tobacco: Never Used   Vaping Use     Vaping Use: Never used   Substance Use Topics     Alcohol use: No     Comment: Stopped drinking alcohol      Drug use: No       Invasive Procedure Safety Checklist:    Procedure: Urodynamics    Action: Complete sections and checkboxes as appropriate.  Pre-procedure:  1. Patient ID Verified with 2 identifiers (Brandie and  or MRN) : YES    2. Procedure and site verified with patient/designee (when able) : YES    3. Accurate consent documentation in medical record : YES    4. H&P (or appropriate assessment) documented in medical record : N/A  H&P must be up to 30 days prior to procedure an updated within 24 hours of  Procedure as applicable.     5. Relevant diagnostic and radiology test results appropriately labeled and displayed as applicable : YES    6. Blood products, implants, devices, and/or special equipment available for the procedure as applicable : YES    7. Procedure site(s) marked with provider initials [Exclusions: none] : NO    8. Marking not required. Reason : Yes  Procedure does not require site marking    Time Out:     Time-Out performed immediately prior to starting procedure, including verbal and active participation of all team members addressing: YES    1. Correct patient identity.  2. Confirmed that the correct side and site are marked.  3. An accurate procedure to be done.  4. Agreement on the procedure to be done.  5. Correct patient position.  6. Relevant images and results are properly labeled and appropriately displayed.  7. The need to administer antibiotics or fluids for irrigation purposes during the procedure as applicable.  8. Safety precautions based on patient history or medication use.    During Procedure: Verification of correct person, site, and procedure occurs any time the responsibility for care of the patient is transferred to another member of the care team.    The following medication was given: Sulfamethoxazole / Trimethoprim    MEDICATION:  Bactrim  ROUTE: PO  SITE: Orally  DOSE: 800/160mg  LOT #: [id number]  : [employer]  EXPIRATION DATE: 08/2022  NDC#: 9701-6205-72   Was there drug waste? No        The following medication was given:     MEDICATION:  Omnipaque (Iohexol Injection) (240mgI/mL)  ROUTE: Provider Administered  SITE: Provider Administered via catheter  DOSE: 200mL  LOT #: [id number]  : [employer]  EXPIRATION DATE: 1/22/2024  NDC#: [address]-43   Was there drug waste? No      Yadi Agosto CMA  9/22/2021  2:49 PM

## 2021-09-22 NOTE — PROGRESS NOTES
PREPROCEDURE DIAGNOSES:    1. Lower urinary tract symptoms (overactive bladder symptoms)    POSTPROCEDURE DIAGNOSES:  -Maximum cystometric capacity ~240 mL with heightened filling sensations.  -Good bladder compliance with high pressure detrusor overactivity (Pdet reaches >100 cm H2O at the peak of these uninhibited detrusor contractions) with associated urgency incontinence starting at bladder volumes >150 mL. He leaks with the first episode of DO but is then able to suppress the incontinence and the bladder spasm subsides. During the second episode of DO, he is able to suppress incontinence but expresses imminent desire to void and is thus given permission to void.  -Strong detrusor contraction during voiding reaching 109 cm H2O.   -He voids 221 mL with slow flow (Qmax 12 ml/s) and mild incomplete bladder emptying (PVR 20 mL). On fluoroscopy, a small volume of contrast is retained within bilateral bladder diverticuli post-void.   -BOOI is 68.4 which is suggestive for bladder outlet obstruction.  -Fluoroscopy otherwise reveals a moderately trabeculated bladder wall without vesicoureteral reflux. The bladder neck initially appears closed during filling and appears open during episodes of incontinence and voiding.     PROCEDURE:    1. Sterile urethral catheterization for measurement of residual urine volume.  2. Complex filling cystometrogram with measurement of bladder and rectal pressures.  3. Complex voiding cystometrogram with measurement of bladder and rectal pressures.  4. Electromyography of the pelvic floor during urodynamics.  5. Fluoroscopic imaging of the bladder during urodynamics, at least 3 views.    6. Interpretation of urodynamics and flouroscopic imaging.      INDICATIONS FOR PROCEDURE:  Mr. Todd S Aschoff is a pleasant 65 year old male with LUTS and overactive bladder symptoms. Baseline video urodynamic assessment is requested today by Dr. Andersen to better characterize Mr. Todd S Aschoff's voiding  dysfunction.      VOIDING DIARY:  Did not complete.    DESCRIPTION OF PROCEDURE:  Risks, benefits, and alternatives to urodynamics were discussed with the patient and he wished to proceed.  Urodynamics are planned to better assess the primary etiology for Mr. Aschoff's urologic dysfunction.  The patient last took mirabegron in the last 24 hours.  After informed consent was obtained, the patient was taken to the procedure room where uroflowmetry was performed. Findings below.     PRE-STUDY UROFLOWMETRY:  Not performed as patient had no urge to void.  Residual by catheter: 160 mL.  Pretest urine dipstick was negative for leukocytes and nitrites.    Next a 7F double-lumen urodynamics catheter was inserted into the bladder under sterile technique via urethra.  A 7F abdominal manometry catheter was placed in the rectum.  EMG pads were placed on both sides of the anal verge.  The bladder was filled with 200 mL of Iohexol at 40 mL/minute and serial pressures were recorded.  With coughing there was an appropriate rise in vesical and abdominal pressures with no change in detrusor pressure, confirming good study catheter placement.    DURING THE FILLING PHASE:  First sensation: 141 mL.  First Desire: 180 mL.  Strong Desire: 184 mL.  Maximum Capacity: ~240 mL.    Uninhibited detrusor contractions: high pressure DO with associated urgency incontinence starting at bladder volumes >150 mL.  Compliance: good between episodes of DO  Continence: DOI as described above. Did not assess for KHANG.  EMG: concordant during filling.    DURING THE VOIDING PHASE:  Maximum detrusor contraction with void: 109 cm of H2O pressure.  Voided volume: 221 mL.  Maximum flow rate: 12 mL/sec.  Average flow rate: 5.1 mL/sec.  Postvoid Residual: ~20 mL.  EMG activity: quiet.  Character of voiding curve: continuous, slightly prolonged.  BOOI: 68.4 (suggesting obstruction - see key below)  [obstructed (ANSARI index [BOOI] ? 40); equivocal (no definite    obstruction; BOOI 20-40); and no obstruction (BOOI ? 20)]    FLUOROSCOPIC IMAGING OF THE BLADDER DURING URODYNAMICS:  Please note, image numbers on UDS tracings correlate with iSite series numbers on PACS images. Fluoroscopy during today's procedure demonstrated a moderately trabeculated bladder wall diverticuli on either side of the bladder (right > left). No vesicoureteral reflux was observed.  The bladder neck was initially closed during filling and open during episodes of incontinence and voiding.  After voiding to completion, all catheters were removed and the patient was brought back into the consultation room to further discuss today's study results.      ASSESSMENT/PLAN:  Mr. Todd S Aschoff is a pleasant 65 year old male with LUTS who demonstrated the following findings today on urodynamic evaluation:    -Maximum cystometric capacity ~240 mL with heightened filling sensations.  -Good bladder compliance with high pressure detrusor overactivity (Pdet reaches >100 cm H2O at the peak of these uninhibited detrusor contractions) with associated urgency incontinence starting at bladder volumes >150 mL. He leaks with the first episode of DO but is then able to suppress the incontinence and the bladder spasm subsides. During the second episode of DO, he is able to suppress incontinence but expresses imminent desire to void and is thus given permission to void.  -Strong detrusor contraction during voiding reaching 109 cm H2O.   -He voids 221 mL with slow flow (Qmax 12 ml/s) and mild incomplete bladder emptying (PVR 20 mL). On fluoroscopy, a small volume of contrast is retained within bilateral bladder diverticuli post-void.   -BOOI is 68.4 which is suggestive for bladder outlet obstruction.  -Fluoroscopy otherwise reveals a moderately trabeculated bladder wall without vesicoureteral reflux. The bladder neck initially appears closed during filling and appears open during episodes of incontinence and voiding.     The  patient will follow up as scheduled with Dr. Andersen to further discuss today's study results and make plans for how best to proceed.      - A single Bactrim antibiotic was provided for UTI prophylaxis following completion of today's study per department protocol.  The risk of UTI with VUDS is low at ~2.5-3%.      Thank you for allowing me to participate in the care of Mr. Todd S Aschoff and please don't hesitate to contact me with any questions or concerns.      Purnima Sutherland PA-C  Urology Physician Assistant

## 2021-09-28 ENCOUNTER — ANTICOAGULATION THERAPY VISIT (OUTPATIENT)
Dept: ANTICOAGULATION | Facility: CLINIC | Age: 66
End: 2021-09-28

## 2021-09-28 ENCOUNTER — LAB (OUTPATIENT)
Dept: LAB | Facility: CLINIC | Age: 66
End: 2021-09-28
Payer: COMMERCIAL

## 2021-09-28 DIAGNOSIS — Z79.01 LONG TERM CURRENT USE OF ANTICOAGULANT THERAPY: ICD-10-CM

## 2021-09-28 DIAGNOSIS — Z53.9 DIAGNOSIS NOT YET DEFINED: Primary | ICD-10-CM

## 2021-09-28 DIAGNOSIS — I48.91 ATRIAL FIBRILLATION (H): ICD-10-CM

## 2021-09-28 DIAGNOSIS — Z95.2 AORTIC VALVE PROSTHESIS PRESENT: ICD-10-CM

## 2021-09-28 DIAGNOSIS — I48.0 PAF (PAROXYSMAL ATRIAL FIBRILLATION) (H): ICD-10-CM

## 2021-09-28 DIAGNOSIS — Z95.2 AORTIC VALVE PROSTHESIS PRESENT: Primary | ICD-10-CM

## 2021-09-28 LAB — INR BLD: 2.7 (ref 0.9–1.1)

## 2021-09-28 PROCEDURE — 85610 PROTHROMBIN TIME: CPT

## 2021-09-28 PROCEDURE — 36416 COLLJ CAPILLARY BLOOD SPEC: CPT

## 2021-09-28 PROCEDURE — G0179 MD RECERTIFICATION HHA PT: HCPCS | Performed by: INTERNAL MEDICINE

## 2021-09-28 NOTE — PROGRESS NOTES
Anticoagulation Management    Unable to reach Nicko by phone today. Tried cell phone and home number.    Today's INR result of 2.7 is therapeutic (goal INR of 2.5-3.5).  Result received from: Clinic Lab    Follow up required to:   Confirm warfarin dose taken and assess for changes  Schedule next INR appointment    Left message to continue current dose of warfarin 7.5 mg tonight.  Left message to call back.      Anticoagulation clinic to follow up    [facility]  [phone]

## 2021-09-29 ENCOUNTER — VIRTUAL VISIT (OUTPATIENT)
Dept: UROLOGY | Facility: CLINIC | Age: 66
End: 2021-09-29
Payer: COMMERCIAL

## 2021-09-29 VITALS — WEIGHT: 315 LBS | HEIGHT: 74 IN | BODY MASS INDEX: 40.43 KG/M2

## 2021-09-29 DIAGNOSIS — N31.9 NEUROGENIC BLADDER: ICD-10-CM

## 2021-09-29 DIAGNOSIS — N40.1 BPH WITH OBSTRUCTION/LOWER URINARY TRACT SYMPTOMS: ICD-10-CM

## 2021-09-29 DIAGNOSIS — N13.8 BPH WITH OBSTRUCTION/LOWER URINARY TRACT SYMPTOMS: ICD-10-CM

## 2021-09-29 DIAGNOSIS — N32.81 OAB (OVERACTIVE BLADDER): Primary | ICD-10-CM

## 2021-09-29 PROCEDURE — 99214 OFFICE O/P EST MOD 30 MIN: CPT | Mod: 95 | Performed by: UROLOGY

## 2021-09-29 RX ORDER — CEPHALEXIN 500 MG/1
500 CAPSULE ORAL 2 TIMES DAILY
Qty: 6 CAPSULE | Refills: 0 | Status: SHIPPED | OUTPATIENT
Start: 2021-09-29 | End: 2021-10-02

## 2021-09-29 RX ORDER — DIAZEPAM 5 MG
5 TABLET ORAL ONCE
Qty: 1 TABLET | Refills: 0 | Status: SHIPPED | OUTPATIENT
Start: 2021-09-29 | End: 2021-09-29

## 2021-09-29 RX ORDER — OXYCODONE AND ACETAMINOPHEN 5; 325 MG/1; MG/1
1 TABLET ORAL ONCE
Qty: 1 TABLET | Refills: 0 | Status: SHIPPED | OUTPATIENT
Start: 2021-09-29 | End: 2021-09-29

## 2021-09-29 ASSESSMENT — PAIN SCALES - GENERAL: PAINLEVEL: EXTREME PAIN (8)

## 2021-09-29 ASSESSMENT — MIFFLIN-ST. JEOR: SCORE: 2294.25

## 2021-09-29 NOTE — PROGRESS NOTES
Left messages on home and cell phones to call [phone].     Lucina Andrew RN, BSN  Anticoagulation Clinic

## 2021-09-29 NOTE — PROGRESS NOTES
ANTICOAGULATION MANAGEMENT     Todd S Aschoff 65 year old male is on warfarin with therapeutic INR result. (Goal INR 2.5-3.5)    Recent labs: (last 7 days)     09/28/21  1533   INR 2.7*       ASSESSMENT     Source(s): Chart Review and Patient/Caregiver Call       Warfarin doses taken: Warfarin taken as instructed    Diet: No new diet changes identified    New illness, injury, or hospitalization: No    Medication/supplement changes: None noted    Signs or symptoms of bleeding or clotting: No    Previous INR: Therapeutic last visit; previously outside of goal range    Additional findings: None     PLAN     Recommended plan for no diet, medication or health factor changes affecting INR     Dosing Instructions: Continue your current warfarin dose with next INR in 2 weeks       Summary  As of 9/28/2021    Full warfarin instructions:  7.5 mg every Sun, Tue, Fri; 5 mg all other days   Next INR check:  10/12/2021             Telephone call with Nicko who verbalizes understanding and agrees to plan    Lab visit scheduled    Education provided: Please call back if any changes to your diet, medications or how you've been taking warfarin and Contact [phone]  with any changes, questions or concerns.     Plan made per [facility] anticoagulation protocol    Lucina Andrew RN  Anticoagulation Clinic  9/29/2021    _______________________________________________________________________     Anticoagulation Episode Summary     Current INR goal:  2.5-3.5   TTR:  54.1 % (10.4 mo)   Target end date:  Indefinite   Send INR reminders to:  AIDA GUERRERO    Indications    PAF (paroxysmal atrial fibrillation) (H) [I48.0]  Long term current use of anticoagulant therapy (Resolved) [Z79.01]  Mechanical AV Replacement 1992 -- on Warfarin  [Z95.2]           Comments:           Anticoagulation Care Providers     Provider Role Specialty Phone number    Obdulio Ingram MD Referring Internal Medicine [phone]

## 2021-09-29 NOTE — LETTER
9/29/2021       RE: Todd S Aschoff  [address]  [place] [address]     Dear Colleague,    Thank you for referring your patient, Todd S Aschoff, to the [facility] at [facility]. Please see a copy of my visit note below.    [place]  CHIEF COMPLAINT   It was my pleasure to see Todd S Aschoff who is a 65 year old male for follow-up of Elevated PSA.      HPI   Todd S Aschoff is a very pleasant 65 year old male who presents with a history of Elevated PSA.  Patient had previously followed with Dr. Gonzales and was referred to Dr. Mock for discussion of possible InterStim or Botox for his overactive bladder symptoms.  However he was noted to have an elevated PSA and I discussed with him proceeding with a prostate biopsy.  He underwent a MR fusion biopsy on February 17 which was based on his prior MRI from 2017.  He returns today to discuss the results of his biopsy.  There is no evidence of prostate cancer.    6/23/21:  On mirabegron (Myrbetriq) for the past 2 years   He is on Warfarin secondary to a heart valve   Follow-up today he is very interested in a REZUM  He does not recall discussion of an InterStim    7/26/21:  Follow-up today for cystoscopy  He notes his most bothersome symptom is nocturia and proceed frequency  He does note that he has had 4 small strokes in the prior year    AUASS: 3-3-1-0-1-0-2/3 = 10/11  QOL = 5  PVR = 34cc    9/22/21:  Urodynamics (UDS) with Purnima Sutherland  POSTPROCEDURE DIAGNOSES:  -Maximum cystometric capacity ~240 mL with heightened filling sensations.  -Good bladder compliance with high pressure detrusor overactivity (Pdet reaches >100 cm H2O at the peak of these uninhibited detrusor contractions) with associated urgency incontinence starting at bladder volumes >150 mL. He leaks with the first episode of DO but is then able to suppress the incontinence and the bladder spasm subsides. During the second  "episode of DO, he is able to suppress incontinence but expresses imminent desire to void and is thus given permission to void.  -Strong detrusor contraction during voiding reaching 109 cm H2O.   -He voids 221 mL with slow flow (Qmax 12 ml/s) and mild incomplete bladder emptying (PVR 20 mL). On fluoroscopy, a small volume of contrast is retained within bilateral bladder diverticuli post-void.   -BOOI is 68.4 which is suggestive for bladder outlet obstruction.  -Fluoroscopy otherwise reveals a moderately trabeculated bladder wall without vesicoureteral reflux. The bladder neck initially appears closed during filling and appears open during episodes of incontinence and voiding.     TODAY 9/29/21:  Follow up to discuss Urodynamics (UDS)  He remains very interested in the Rezum procedure    PHYSICAL EXAM  Patient is a 65 year old  male   Vitals: Height 1.867 m (6' 1.5\"), weight 144.2 kg (318 lb).  Body mass index is 41.39 kg/m .  General: No evidence of distress   Lungs: normal respiratory effort  Neuro: Alert, oriented, speech and mentation normal  Psych: affect and mood normal       Component PSA PSA Diag Urologic Phys     Component PSA PSA Diag Urologic Phys   Latest Ref Rng & Units 0.00 - 4.00 ug/L 0.00 - 4.00 ng/mL   4/20/2004 0.56    5/12/2006 1.11    9/5/2007 1.82    10/29/2008 2.46    10/23/2009 1.44    12/27/2010 4.68 (H)    1/6/2012 4.13 (H)    10/31/2012 4.08 (H)    12/17/2013 3.70    12/24/2014 3.37    1/8/2016 4.57 (H)    1/25/2017  13.80 (H)   2/22/2017 5.84 (H)    11/1/2017  3.80   6/12/2018 3.84    10/29/2019 6.28 (H)    1/14/2020 6.93 (H)    7/26/2021 4.80 (H)      PATHOLOGY:  A.-M. - no evidence of disease     IMAGING:  All pertinent imaging reviewed:    All imaging studies reviewed by me.  I personally reviewed these imaging films.  A formal report from radiology will follow.  FINDINGS:  Prostate gland size: 35 x 51 x 50  Volume: 46.4 g     Peripheral zone: In the left posterior peripheral zone in the " mid  gland (series 7 image 31), there is a 4 x 4 by 4 mm focus of T2  hypointensity with moderately decreased ADC signal, no significant  increased signal on high B value diffusion weighted imaging, and no  early enhancement (best visualized on series 9 image 15, series 6  image 19, series 5 image 25, series 3 image 13).     PI-RADS:  Peripheral zone T2: 3  Diffusion-weighted image: 3    Contrast-enhanced images: Negative       Overall assessment: 3     Transitional zone: There are BPH type changes without suspicious  lesion.     PI-RADS:  Transition zone T2: 2  Diffusion-weighted image: 2  Contrast-enhanced images: Negative       Overall assessment: 2     Remainder of the pelvis:  No lymphadenopathy. The visualized  vasculature is patent. Partially decompressed bladder, otherwise  unremarkable. Partially evaluated bilateral hip prosthesis,  surrounding metal artifact limits evaluation. No suspicious focal  lesions in the bones or the soft tissue. The visualized bowel is  unremarkable. Small bilateral fat-containing inguinal hernias.  Hydrocele.                                                     IMPRESSION:   1. Based on the most suspicious abnormality, this exam is  characterized as PIRADS 3 - Intermediate probability.  The presence of  clinically significant cancer is equivocal. The most suspicious focus  is in the left posterior lateral peripheral zone at the 5:00 position  measuring 4 mm. There is no evidence for extraprostatic extension.  2. No evidence of extraprostatic malignancy. No suspicious adenopathy  or evidence of pelvic metastases.    ASSESSMENT and PLAN  65-year-old man with history of elevated PSA and overactive bladder symptoms    Elevated PSA  -He is now status post an MR fusion biopsy with no evidence of prostate cancer  -PSA today stable and decreased to 4.80  -Plan for continued annual monitoring    LUTS and overactive bladder symptoms  -Cystoscopy previously with some evidence of possible  outlet obstruction, however also evidence of a overactive bladder.  His bladder only partially distended before having a spontaneous contraction which he seemed unaware of  -Reviewed the results of his urodynamics which did show some detrusor overactivity as well as evidence of bladder outlet obstruction with 2 small lateral bladder diverticula  -Given his evidence of bladder outlet obstruction, we discussed the rationale for proceeding with a bladder outlet procedure to try and decrease the bladder outlet resistance and stress on the bladder  -We again discussed the Rezum procedure in detail as well as the expected postoperative outcome and the need to be off his warfarin with transition to Lovenox.  He would need to hold the Lovenox shot the day of the procedure and could resume his warfarin the evening of the procedure and Lovenox the day after the procedure  -My office will contact him to arrange the procedure  -Prescriptions for antibiotics, oxycodone, and Valium sent to the pharmacy    Chart documentation with Dragon Voice recognition Software. Although reviewed after completion, some words and grammatical errors may remain.       Time spent: 15 minutes spent on the date of the encounter doing chart review, history and exam, documentation and further activities as noted above.    Telephone time: 11 minutes    Jamal Andersen MD   Urology  [facility]  Clinic Phone [phone]      Nicko is a 65 year old who is being evaluated via a billable video visit.      How would you like to obtain your AVS? MyChart  If the video visit is dropped, the invitation should be resent by: Text to cell phone: [phone]  Will anyone else be joining your video visit? No        Video-Visit Details -this was converted to a telephone encounter given patient connection issues    Type of service:  Video Visit    Video Start Time: 4:18 PM    Video End Time:4:31 PM    Originating Location (pt. Location):  Home    Distant Location (provider location):  [facility]     Platform used for Video Visit: Amber

## 2021-09-29 NOTE — PROGRESS NOTES
[facility]  CHIEF COMPLAINT   It was my pleasure to see Todd S Aschoff who is a 65 year old male for follow-up of Elevated PSA.      HPI   Todd S Aschoff is a very pleasant 65 year old male who presents with a history of Elevated PSA.  Patient had previously followed with Dr. Gonzales and was referred to Dr. Mock for discussion of possible InterStim or Botox for his overactive bladder symptoms.  However he was noted to have an elevated PSA and I discussed with him proceeding with a prostate biopsy.  He underwent a MR fusion biopsy on February 17 which was based on his prior MRI from 2017.  He returns today to discuss the results of his biopsy.  There is no evidence of prostate cancer.    6/23/21:  On mirabegron (Myrbetriq) for the past 2 years   He is on Warfarin secondary to a heart valve   Follow-up today he is very interested in a REZUM  He does not recall discussion of an InterStim    7/26/21:  Follow-up today for cystoscopy  He notes his most bothersome symptom is nocturia and proceed frequency  He does note that he has had 4 small strokes in the prior year    AUASS: 3-3-1-0-1-0-2/3 = 10/11  QOL = 5  PVR = 34cc    9/22/21:  Urodynamics (UDS) with Purnima Sutherland  POSTPROCEDURE DIAGNOSES:  -Maximum cystometric capacity ~240 mL with heightened filling sensations.  -Good bladder compliance with high pressure detrusor overactivity (Pdet reaches >100 cm H2O at the peak of these uninhibited detrusor contractions) with associated urgency incontinence starting at bladder volumes >150 mL. He leaks with the first episode of DO but is then able to suppress the incontinence and the bladder spasm subsides. During the second episode of DO, he is able to suppress incontinence but expresses imminent desire to void and is thus given permission to void.  -Strong detrusor contraction during voiding reaching 109 cm H2O.   -He voids 221 mL with slow flow (Qmax 12 ml/s) and mild incomplete bladder emptying (PVR 20 mL). On fluoroscopy, a  "small volume of contrast is retained within bilateral bladder diverticuli post-void.   -BOOI is 68.4 which is suggestive for bladder outlet obstruction.  -Fluoroscopy otherwise reveals a moderately trabeculated bladder wall without vesicoureteral reflux. The bladder neck initially appears closed during filling and appears open during episodes of incontinence and voiding.     TODAY 9/29/21:  Follow up to discuss Urodynamics (UDS)  He remains very interested in the Rezum procedure    PHYSICAL EXAM  Patient is a 65 year old  male   Vitals: Height 1.867 m (6' 1.5\"), weight 144.2 kg (318 lb).  Body mass index is 41.39 kg/m .  General: No evidence of distress   Lungs: normal respiratory effort  Neuro: Alert, oriented, speech and mentation normal  Psych: affect and mood normal       Component PSA PSA Diag Urologic Phys     Component PSA PSA Diag Urologic Phys   Latest Ref Rng & Units 0.00 - 4.00 ug/L 0.00 - 4.00 ng/mL   4/20/2004 0.56    5/12/2006 1.11    9/5/2007 1.82    10/29/2008 2.46    10/23/2009 1.44    12/27/2010 4.68 (H)    1/6/2012 4.13 (H)    10/31/2012 4.08 (H)    12/17/2013 3.70    12/24/2014 3.37    1/8/2016 4.57 (H)    1/25/2017  13.80 (H)   2/22/2017 5.84 (H)    11/1/2017  3.80   6/12/2018 3.84    10/29/2019 6.28 (H)    1/14/2020 6.93 (H)    7/26/2021 4.80 (H)      PATHOLOGY:  A.-M. - no evidence of disease     IMAGING:  All pertinent imaging reviewed:    All imaging studies reviewed by me.  I personally reviewed these imaging films.  A formal report from radiology will follow.  FINDINGS:  Prostate gland size: 35 x 51 x 50  Volume: 46.4 g     Peripheral zone: In the left posterior peripheral zone in the mid  gland (series 7 image 31), there is a 4 x 4 by 4 mm focus of T2  hypointensity with moderately decreased ADC signal, no significant  increased signal on high B value diffusion weighted imaging, and no  early enhancement (best visualized on series 9 image 15, series 6  image 19, series 5 image 25, series " 3 image 13).     PI-RADS:  Peripheral zone T2: 3  Diffusion-weighted image: 3    Contrast-enhanced images: Negative       Overall assessment: 3     Transitional zone: There are BPH type changes without suspicious  lesion.     PI-RADS:  Transition zone T2: 2  Diffusion-weighted image: 2  Contrast-enhanced images: Negative       Overall assessment: 2     Remainder of the pelvis:  No lymphadenopathy. The visualized  vasculature is patent. Partially decompressed bladder, otherwise  unremarkable. Partially evaluated bilateral hip prosthesis,  surrounding metal artifact limits evaluation. No suspicious focal  lesions in the bones or the soft tissue. The visualized bowel is  unremarkable. Small bilateral fat-containing inguinal hernias.  Hydrocele.                                                     IMPRESSION:   1. Based on the most suspicious abnormality, this exam is  characterized as PIRADS 3 - Intermediate probability.  The presence of  clinically significant cancer is equivocal. The most suspicious focus  is in the left posterior lateral peripheral zone at the 5:00 position  measuring 4 mm. There is no evidence for extraprostatic extension.  2. No evidence of extraprostatic malignancy. No suspicious adenopathy  or evidence of pelvic metastases.    ASSESSMENT and PLAN  65-year-old man with history of elevated PSA and overactive bladder symptoms    Elevated PSA  -He is now status post an MR fusion biopsy with no evidence of prostate cancer  -PSA today stable and decreased to 4.80  -Plan for continued annual monitoring    LUTS and overactive bladder symptoms  -Cystoscopy previously with some evidence of possible outlet obstruction, however also evidence of a overactive bladder.  His bladder only partially distended before having a spontaneous contraction which he seemed unaware of  -Reviewed the results of his urodynamics which did show some detrusor overactivity as well as evidence of bladder outlet obstruction with 2  small lateral bladder diverticula  -Given his evidence of bladder outlet obstruction, we discussed the rationale for proceeding with a bladder outlet procedure to try and decrease the bladder outlet resistance and stress on the bladder  -We again discussed the Rezum procedure in detail as well as the expected postoperative outcome and the need to be off his warfarin with transition to Lovenox.  He would need to hold the Lovenox shot the day of the procedure and could resume his warfarin the evening of the procedure and Lovenox the day after the procedure  -My office will contact him to arrange the procedure  -Prescriptions for antibiotics, oxycodone, and Valium sent to the pharmacy    Chart documentation with Dragon Voice recognition Software. Although reviewed after completion, some words and grammatical errors may remain.       Time spent: 15 minutes spent on the date of the encounter doing chart review, history and exam, documentation and further activities as noted above.    Telephone time: 11 minutes    Jamal Andersen MD   Urology  [facility]  Clinic Phone [phone]      Nicko is a 65 year old who is being evaluated via a billable video visit.      How would you like to obtain your AVS? MyChart  If the video visit is dropped, the invitation should be resent by: Text to cell phone: [phone]  Will anyone else be joining your video visit? No        Video-Visit Details -this was converted to a telephone encounter given patient connection issues    Type of service:  Video Visit    Video Start Time: 4:18 PM    Video End Time:4:31 PM    Originating Location (pt. Location): Home    Distant Location (provider location):  [facility] [place]     Platform used for Video Visit: Global Experience

## 2021-09-30 ENCOUNTER — TELEPHONE (OUTPATIENT)
Dept: CARE COORDINATION | Facility: CLINIC | Age: 66
End: 2021-09-30

## 2021-09-30 DIAGNOSIS — I48.0 PAF (PAROXYSMAL ATRIAL FIBRILLATION) (H): Primary | ICD-10-CM

## 2021-09-30 DIAGNOSIS — M48.061 SPINAL STENOSIS OF LUMBAR REGION, UNSPECIFIED WHETHER NEUROGENIC CLAUDICATION PRESENT: ICD-10-CM

## 2021-09-30 DIAGNOSIS — R41.3 MEMORY DIFFICULTIES: ICD-10-CM

## 2021-09-30 DIAGNOSIS — R20.2 PARESTHESIAS: ICD-10-CM

## 2021-09-30 NOTE — TELEPHONE ENCOUNTER
Leticia,  Mr. Aschoff is going to be discharged from homecare services immediately, his insurance will no longer cover homecare for Nicko.  I would strongly recommend that this patient be assigned a care coordinator, as they still have needs and concerns in the home.  They also seem like perfect candidates for in home INR machine.  It is challenging for him to get out of the home, in car, to clinic, just for INR check.  His wife certainly can do the INR check, I'm just not sure how they get signed up to have the machine at home.  I'm hoping that your clinic care coordinator can contact Nicko and his wife Paul, to help them with this service.  Thank you.    Sincerely,  Deyanira Gonzalez RN, BSN  [facility]  [phone]

## 2021-10-01 ENCOUNTER — PATIENT OUTREACH (OUTPATIENT)
Dept: NURSING | Facility: CLINIC | Age: 66
End: 2021-10-01
Attending: INTERNAL MEDICINE
Payer: MEDICARE

## 2021-10-01 DIAGNOSIS — R41.3 MEMORY DIFFICULTIES: ICD-10-CM

## 2021-10-01 DIAGNOSIS — I48.0 PAF (PAROXYSMAL ATRIAL FIBRILLATION) (H): ICD-10-CM

## 2021-10-01 DIAGNOSIS — M48.061 SPINAL STENOSIS OF LUMBAR REGION, UNSPECIFIED WHETHER NEUROGENIC CLAUDICATION PRESENT: ICD-10-CM

## 2021-10-01 DIAGNOSIS — R20.2 PARESTHESIAS: ICD-10-CM

## 2021-10-01 ASSESSMENT — ACTIVITIES OF DAILY LIVING (ADL): DEPENDENT_IADLS:: MEDICATION MANAGEMENT

## 2021-10-01 NOTE — LETTER
[facility] CARE COORDINATION  [address] [place] [address]      October 5, 2021    Nicko SHINE Sujitdemetrio  [address]  [place] [address]      Dear Nicko,    I am a clinic care coordinator who works with Herve Morgan MD at [facility]. I wanted to thank you for spending the time to talk with me.  Below is a description of clinic care coordination and how I can further assist you.      The clinic care coordination team is made up of a registered nurse,  and community health worker who understand the health care system. The goal of clinic care coordination is to help you manage your health and improve access to the health care system in the most efficient manner. The team can assist you in meeting your health care goals by providing education, coordinating services, strengthening the communication among your providers and supporting you with any resource needs.    Please feel free to contact me at [phone] with any questions or concerns. We are focused on providing you with the highest-quality healthcare experience possible and that all starts with you.     Sincerely,     Vanessa Davenport RN Care Coordinator  [facility] [place]  Email: [email or web address]  Phone: [phone]       Enclosed: I have enclosed a copy of the Patient Centered Plan of Care. This has helpful information and goals that we have talked about. Please keep this in an easy to access place to use as needed.

## 2021-10-01 NOTE — LETTER
[facility]  Patient Centered Plan of Care  About Me:        Patient Name:  Todd S Aschoff    YOB: 1955  Age:         65 year old   [place] MRN:    [id number] Telephone Information:  Home Phone [phone]   Mobile [phone]       Address:  [address] [place] [address] Email address:  [email or web address]      Emergency Contact(s)    Name Relationship Lgl Grd Work Phone Home Phone Mobile Phone   1. ASCHOFF,JANICE Spouse  [phone]           Primary language:  English     needed? No   [place] Language Services:  [phone] op. 1  Other communication barriers:[place] (Hard of hearing);Glasses    Preferred Method of Communication:  Mail  Current living arrangement: I live in a private home with family    Mobility Status/ Medical Equipment: Independent        Health Maintenance  Health Maintenance Reviewed: Due/Overdue   Health Maintenance Topics with due status: Overdue       Topic Date Due    MIGRAINE ACTION PLAN Never done    HIV SCREENING Never done    ZOSTER IMMUNIZATION 12/17/2018    MEDICARE ANNUAL WELLNESS VISIT 10/29/2020    INFLUENZA VACCINE 09/01/2021     My Access Plan  Medical Emergency 911   Primary Clinic Line [facility] - [phone]   24 Hour Appointment Line [phone] or  [phone]) (toll-free)   24 Hour Nurse Line [phone] (toll-free)   Preferred Urgent Care [facility] [place], [phone]     [facility]  [phone]     Preferred Pharmacy [id number] - [place], [place] - [address]     Behavioral Health Crisis Line The National Suicide Prevention Lifeline at 1-519.693.4849 or 911     My Care Team Members  Patient Care Team       Relationship Specialty Notifications Start End    Herve Morgan MD PCP - General Internal Medicine - Pediatrics  7/26/21     Phone: [phone] Fax: [phone]          [address] [place] [address]    Obdulio Ingram MD Assigned PCP   5/7/17     Phone: [phone] Pager: [phone] Fax: [phone]        [address] [facility] [address]    Jamal Andersen MD Assigned Surgical Provider   10/23/20     Phone: [phone] Fax: [phone]         [address] [facility] [address]    Vanessa Davenport, RN Lead Care Coordinator  Admissions 10/1/21     Phone: [phone]                 My Care Plans  Self Management and Treatment Plan  Goals and (Comments)  Goals        General     1. Problem Solving (pt-stated)      Notes - Note created  10/4/2021  1:51 PM by Vanessa Davenport, RN     Goal Statement: I would like support and assistance with home INR monitoring.   Date Goal set: 10/01/2021  Barriers: home care therapies ended due to insurance, limited transportation availability  Strengths: engaged in care coordination  Date to Achieve By: 04/2022  Patient expressed understanding of goal: yes  Action steps to achieve this goal:  1. I will follow up with my providers as scheduled/recommended.   2. I will take my medications as prescribed.   3. I will discuss, review, schedule and complete recommended overdue health maintenance with my Primary Care Provider.   4. I will work with my care team regarding the status of home INR referral/monitoring program.   5. I will contact my care team with questions, concerns, support needs. I will use the clinic as a resource and I understand I can contact my clinic with 24/7 after hours services available. Care Coordinator will remain available as needed.                 Action Plans on File:            Depression          Advance Care Plans/Directives Type:   No data recorded    My Medical and Care Information  Problem List   Patient Active Problem List   Diagnosis     PAF (paroxysmal atrial fibrillation) (H)     Hypothyroidism     Mechanical AV Replacement 1992 -- on Warfarin      HYPERLIPIDEMIA LDL GOAL <130      BPH (benign prostatic hypertrophy)     Chronic low back pain     Tourette syndrome     Obesity     Lumbar radiculopathy     Hx of Thoracic Dissection repair -- 1992     Memory difficulties     OAB (overactive bladder)     Bilateral thoracic back pain     Obesity, BMI >35 with comorbidities     Alcohol dependence in remission (H)     Elevated prostate specific antigen (PSA)     Suicide attempt by multiple drug overdose, initial encounter (H)     Depression, unspecified depression type     Leg wound, left     Polypharmacy     Renal insufficiency     Hypotension, unspecified hypotension type     Anemia, unspecified type     Radiculitis     Rotator cuff tear     S/P lumbar fusion     Spinal stenosis of lumbar region     Hx of CVA x 4, 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     Left Asad-Paresthesias     Spells of decreased attentiveness      Current Medications and Allergies:    Allergies   Allergen Reactions     Gabapentin      Severe behavioral disturbances      Current Outpatient Medications   Medication     acetaminophen (TYLENOL) 500 MG tablet     aspirin (ASA) 81 MG chewable tablet     cetirizine (ZYRTEC) 10 MG tablet     cyclobenzaprine (FLEXERIL) 10 MG tablet     diazepam (VALIUM) 5 MG tablet     donepezil (ARICEPT) 10 MG tablet     folic acid (FOLVITE) 1 MG tablet     guanFACINE (TENEX) 1 MG tablet     lamoTRIgine (LAMICTAL) 25 MG tablet     levothyroxine (SYNTHROID/LEVOTHROID) 150 MCG tablet     liothyronine (CYTOMEL) 25 MCG tablet     memantine (NAMENDA) 10 MG tablet     mirabegron (MYRBETRIQ) 50 MG 24 hr tablet     pregabalin (LYRICA) 100 MG capsule     propafenone (RYTHMOL) 150 MG TABS tablet     venlafaxine (EFFEXOR-XR) 75 MG 24 hr capsule     warfarin ANTICOAGULANT (COUMADIN) 2.5 MG tablet     warfarin ANTICOAGULANT (COUMADIN) 2.5 MG tablet     No current facility-administered medications for this visit.        Care Coordination Start Date: 10/1/2021   Frequency of Care Coordination: monthly     Form Last Updated: 10/05/2021

## 2021-10-01 NOTE — PROGRESS NOTES
Clinic Care Coordination Contact  OUTREACH    Referral Information:  Referral Source: Home Care    Primary Diagnosis: Cardiovascular - other    Chief Complaint   Patient presents with     Clinic Care Coordination - Initial     INR HOME NEEDS        [place] Utilization:   Clinic Utilization  Difficulty keeping appointments:: No  Compliance Concerns: No  No-Show Concerns: No  No PCP office visit in Past Year: No  Utilization    Hospital Admissions  7             ED Visits  11             No Show Count (past year)  7                Current as of: 10/2/2021  9:00 AM            Clinical Concerns:  Current Medical Concerns:    Patient Active Problem List   Diagnosis     PAF (paroxysmal atrial fibrillation) (H)     Hypothyroidism     Mechanical AV Replacement 1992 -- on Warfarin      HYPERLIPIDEMIA LDL GOAL <130     BPH (benign prostatic hypertrophy)     Chronic low back pain     Tourette syndrome     Obesity     Lumbar radiculopathy     Hx of Thoracic Dissection repair -- 1992     Memory difficulties     OAB (overactive bladder)     Bilateral thoracic back pain     Obesity, BMI >35 with comorbidities     Alcohol dependence in remission (H)     Elevated prostate specific antigen (PSA)     Suicide attempt by multiple drug overdose, initial encounter (H)     Depression, unspecified depression type     Leg wound, left     Polypharmacy     Renal insufficiency     Hypotension, unspecified hypotension type     Anemia, unspecified type     Radiculitis     Rotator cuff tear     S/P lumbar fusion     Spinal stenosis of lumbar region     Hx of CVA x 4, 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     Left Asad-Paresthesias     Spells of decreased attentiveness      Patient states difficulty with completing outpatient INR monitoring due to transportation barrier as he shares a vehicle with his children.   Patient states that he sees a psychiatrist and a therapist for mental health support, however does not recall name of clinic or provider.   He reports that  "he has an appointment scheduled next week to get hearing aides.   Patient declines any additional resources for transportation at this time.   Patient is interested in getting home INR monitor. He denies any additional concerns, resource, support needs from clinic care coordination team at this time.   Telephone encounter 09/30/2021 from home care indicates outstanding needs and concerns in the home - patient declines any present. RNCC left voicemail message for home care with call back information and requested a call back to discuss further.   RNCC spoke with anticoagulation nurse regarding process for obtaining home INR monitor, eligibility, criteria, etc.       Current Behavioral Concerns: Male voice answered telephone call, RNCC asked to speak with patient and received response that \"he is unavailable right now\", RNCC then asked to give message to patient of missed call and return call back. Patient then acknowledges and confirms that RNCC is speaking with him and verified date of birth and address.   Education Provided to patient: CC role, clinic after hours, appointments.   Pain  Pain (GOAL):: No (Baseline - chronic)  Health Maintenance Reviewed: Due/Overdue   Health Maintenance Topics with due status: Overdue       Topic Date Due    MIGRAINE ACTION PLAN Never done    HIV SCREENING Never done    ZOSTER IMMUNIZATION 12/17/2018    MEDICARE ANNUAL WELLNESS VISIT 10/29/2020    INFLUENZA VACCINE 09/01/2021     Clinical Pathway: None    Medication Management:  Medication review status: Medications reviewed and no changes reported per patient.      Patient reports that his son assists with medication management. Patient reports he is taking his medications as prescribed and denies any questions, concerns, side effects regarding medications at this time.      Functional Status:  Dependent ADLs:: Independent  Dependent IADLs:: Medication Management  Bed or wheelchair confined:: No  Mobility Status: Independent  Fallen " 2 or more times in the past year?: No  Any fall with injury in the past year?: No    Living Situation:  Current living arrangement:: I live in a private home with family  Type of residence:: Town home    Lifestyle & Psychosocial Needs:    Social Determinants of Health     Tobacco Use: Low Risk      Smoking Tobacco Use: Never Smoker     Smokeless Tobacco Use: Never Used   Alcohol Use:      Frequency of Alcohol Consumption:      Average Number of Drinks:      Frequency of Binge Drinking:    Financial Resource Strain: Low Risk      Difficulty of Paying Living Expenses: Not hard at all   Food Insecurity: No Food Insecurity     Worried About Running Out of Food in the Last Year: Never true     Ran Out of Food in the Last Year: Never true   Transportation Needs: No Transportation Needs     Lack of Transportation (Medical): No     Lack of Transportation (Non-Medical): No   Physical Activity:      Days of Exercise per Week:      Minutes of Exercise per Session:    Stress:      Feeling of Stress :    Social Connections:      Frequency of Communication with Friends and Family:      Frequency of Social Gatherings with Friends and Family:      Attends Tenriism Services:      Active Member of Clubs or Organizations:      Attends Club or Organization Meetings:      Marital Status:    Intimate Partner Violence: Not At Risk     Fear of Current or Ex-Partner: No     Emotionally Abused: No     Physically Abused: No     Sexually Abused: No   Depression: Not at risk     PHQ-2 Score: 0   Housing Stability:      Unable to Pay for Housing in the Last Year:      Number of Places Lived in the Last Year:      Unstable Housing in the Last Year:      Diet:: Regular  Inadequate nutrition (GOAL):: No  Tube Feeding: No  Inadequate activity/exercise (GOAL):: No  Significant changes in sleep pattern (GOAL): No  Transportation means:: Accessible car  Tenriism or spiritual beliefs that impact treatment:: No  Mental health DX:: Yes  Mental health DX  how managed:: Medication, Outpatient Counseling, Psychiatrist  Mental health management concern (GOAL):: No  Chemical Dependency Status: Past Concern  Chemical Dependency Management:  (None)  Informal Support system:: Family, Spouse, Children      Resources and Interventions:  Current Resources:   Community Resources: OP Mental Health  Supplies used at home:: None  Equipment Currently Used at Home: none  Employment Status: disabled  Advance Care Plan/Directive  Advanced Care Plans/Directives on file:: No  Type Advanced Care Plans/Directives: Advanced Directive - On File, POLST  Advanced Care Plan/Directive Status: Not Applicable     Goals:   Goals        General     1. Problem Solving (pt-stated)      Notes - Note created  10/4/2021  1:51 PM by Vanessa Davenport RN     Goal Statement: I would like support and assistance with home INR monitoring.   Date Goal set: 10/01/2021  Barriers: home care therapies ended due to insurance, limited transportation availability  Strengths: engaged in care coordination  Date to Achieve By: 04/2022  Patient expressed understanding of goal: yes  Action steps to achieve this goal:  1. I will follow up with my providers as scheduled/recommended.   2. I will take my medications as prescribed.   3. I will discuss, review, schedule and complete recommended overdue health maintenance with my Primary Care Provider.   4. I will work with my care team regarding the status of home INR referral/monitoring program.   5. I will contact my care team with questions, concerns, support needs. I will use the clinic as a resource and I understand I can contact my clinic with 24/7 after hours services available. Care Coordinator will remain available as needed.              Patient/Caregiver understanding: Patient verbalized understanding and denies any additional questions or concerns at this time. RNCC engaged in AIDET communications during encounter.     Outreach Frequency: monthly  Future  Appointments              In 1 week EA LAB [facility], EA    In 1 month Herve Morgan MD [facility] ANNETTE Mayer        Plan: RNCC to send introduction letter, complex care plan, medication list to patient via E-Sign.  Patient was provided with writers contact information and encouraged to call with questions, concerns, support needs. RNCC will remain available as needed. RNCC will follow up with patient again 1 month.      Vanessa Davenport RN Care Coordinator  [facility]  Email: [email or web address]  Phone: [phone]

## 2021-10-04 ENCOUNTER — DOCUMENTATION ONLY (OUTPATIENT)
Dept: PEDIATRICS | Facility: CLINIC | Age: 66
End: 2021-10-04
Payer: MEDICARE

## 2021-10-04 DIAGNOSIS — M62.830 BACK MUSCLE SPASM: ICD-10-CM

## 2021-10-04 RX ORDER — DIAZEPAM 5 MG
5 TABLET ORAL
Qty: 30 TABLET | Refills: 0 | Status: SHIPPED | OUTPATIENT
Start: 2021-10-04 | End: 2021-11-01

## 2021-10-04 NOTE — PROGRESS NOTES
Anticoagulation Management    Discussed INR home monitoring program with Margarito Freeman RN for Todd S Aschoff reviewing:      Elibigility requirements: >= 3 months of anticoagulation therapy, indication for chronic anticoagulation and order from provider    Required testing frequency (q1-2 weeks)    Home meters, testing supplies, meter training, and reporting of INR results done through an outside company. Patient would be contacted by home monitoring company to review insurance coverage with home monitoring company prior to enrolling.    [facility] would continue to receive and manage INR results.    Home monitoring application may take several weeks and must continue to follow up with recommended INR monitoring in clinic until receives monitor and training completed.     Home monitoring terms reviewed with Vanessa:      Patient agrees to frequency of testing as directed by referring provider ( weekly or biweekly) Yes    Testing to be performed during business hours of [facility] Yes    Patient agrees they have the skill (or a designated caregiver) necessary to perform the self test Yes    Patient agrees to report all INR results to INR home monitoring company Yes    Patient agrees to have additional INR test in clinic if a home result is critical Yes    Patient agrees to schedule an INR test at a [facility] clinic yearly for technique observation and quality check of INR results with their home meter Yes    Patient agrees to use a [facility] approved service provider and device for home monitoring Yes    [facility]    Referring provider: Herve Mrogan MD    Referring providers Clinic Fax number [phone]    Todd S Aschoff is interested home INR monitoring and requests order be submitted.

## 2021-10-05 ENCOUNTER — TELEPHONE (OUTPATIENT)
Dept: PEDIATRICS | Facility: CLINIC | Age: 66
End: 2021-10-05

## 2021-10-05 DIAGNOSIS — Z95.2 AORTIC VALVE PROSTHESIS PRESENT: Primary | ICD-10-CM

## 2021-10-05 NOTE — TELEPHONE ENCOUNTER
Patient called stating that he has a rezume procedure done (dealing with the prostate and his urethra) in 2 weeks time (patient unsure of the exact date of the procedure. Patient's urologist requested that since patient is on coumadin he will need bridging with Lovenox  prior to his procedure. Patient would like to know how this should be done. Pharmacy pended.     Patient denies needing a pre op physical prior to procedure.     Yadi Robert RN on 10/5/2021 at 5:55 PM

## 2021-10-06 NOTE — TELEPHONE ENCOUNTER
Called and reviewed with patient. No questions.     Per chart review procedure isn't until 12/1, patient said he though it was in a couple weeks. Advised he should reach out to Urology to confirm date/time.

## 2021-10-06 NOTE — TELEPHONE ENCOUNTER
Liam call.      Stop coumadin 5 days before procedure.  Begin lovenox twice daily 4 days before procedure.  Hold lovenox on the AM of procedure, but resume it that night.  Continue lovenox for 4 days after the procedure.    Needs INR checked 4 days after procedure to ensure is theraputic on coumadin again.     Herve Morgan MD  Internal Medicine and Pediatrics

## 2021-10-15 ENCOUNTER — MEDICAL CORRESPONDENCE (OUTPATIENT)
Dept: HEALTH INFORMATION MANAGEMENT | Facility: CLINIC | Age: 66
End: 2021-10-15
Payer: MEDICARE

## 2021-10-20 ENCOUNTER — LAB (OUTPATIENT)
Dept: LAB | Facility: CLINIC | Age: 66
End: 2021-10-20
Payer: COMMERCIAL

## 2021-10-20 ENCOUNTER — ANTICOAGULATION THERAPY VISIT (OUTPATIENT)
Dept: ANTICOAGULATION | Facility: CLINIC | Age: 66
End: 2021-10-20

## 2021-10-20 DIAGNOSIS — I48.0 PAF (PAROXYSMAL ATRIAL FIBRILLATION) (H): Primary | ICD-10-CM

## 2021-10-20 DIAGNOSIS — Z95.2 AORTIC VALVE PROSTHESIS PRESENT: ICD-10-CM

## 2021-10-20 DIAGNOSIS — Z79.01 LONG TERM CURRENT USE OF ANTICOAGULANT THERAPY: ICD-10-CM

## 2021-10-20 DIAGNOSIS — I48.91 ATRIAL FIBRILLATION (H): ICD-10-CM

## 2021-10-20 LAB — INR BLD: 3.3 (ref 0.9–1.1)

## 2021-10-20 PROCEDURE — 85610 PROTHROMBIN TIME: CPT

## 2021-10-20 PROCEDURE — 36416 COLLJ CAPILLARY BLOOD SPEC: CPT

## 2021-10-20 NOTE — PROGRESS NOTES
ANTICOAGULATION MANAGEMENT     Todd S Aschoff 65 year old male is on warfarin with therapeutic INR result. (Goal INR 2.5-3.5)    Recent labs: (last 7 days)     10/20/21  1127   INR 3.3*       ASSESSMENT     Source(s): Chart Review and Patient/Caregiver Call       Warfarin doses taken: Warfarin taken as instructed    Diet: No new diet changes identified    New illness, injury, or hospitalization: No    Medication/supplement changes: None noted    Signs or symptoms of bleeding or clotting: No    Previous INR: Therapeutic last 2(+) visits    Additional findings: Upcoming surgery/procedure Rezum procedure on 12/1/21     PLAN     Recommended plan for no diet, medication or health factor changes affecting INR     Dosing Instructions: Continue your current warfarin dose with next INR in 4 weeks       Summary  As of 10/20/2021    Full warfarin instructions:  7.5 mg every Sun, Tue, Fri; 5 mg all other days   Next INR check:  11/17/2021             Telephone call with  [place], son who verbalizes understanding and agrees to plan    Lab visit scheduled    Education provided: Please call back if any changes to your diet, medications or how you've been taking warfarin    Plan made per [facility] anticoagulation protocol    Soco Das, RN  Anticoagulation Clinic  10/20/2021    _______________________________________________________________________     Anticoagulation Episode Summary     Current INR goal:  2.5-3.5   TTR:  58.0 % (10.5 mo)   Target end date:  Indefinite   Send INR reminders to:  AIDA GUERRERO    Indications    PAF (paroxysmal atrial fibrillation) (H) [I48.0]  Long term current use of anticoagulant therapy (Resolved) [Z79.01]  Mechanical AV Replacement 1992 -- on Warfarin  [Z95.2]           Comments:           Anticoagulation Care Providers     Provider Role Specialty Phone number    Obdulio Ingram MD Referring Internal Medicine [phone]

## 2021-10-21 DIAGNOSIS — G40.909 EPILEPSY (H): Primary | ICD-10-CM

## 2021-10-25 DIAGNOSIS — F33.42 MAJOR DEPRESSIVE DISORDER, RECURRENT EPISODE, IN FULL REMISSION (H): ICD-10-CM

## 2021-10-26 RX ORDER — LIOTHYRONINE SODIUM 25 UG/1
TABLET ORAL
Qty: 30 TABLET | Refills: 0 | Status: SHIPPED | OUTPATIENT
Start: 2021-10-26 | End: 2021-11-30

## 2021-10-26 RX ORDER — FOLIC ACID 1 MG/1
TABLET ORAL
Qty: 150 TABLET | Refills: 0 | Status: SHIPPED | OUTPATIENT
Start: 2021-10-26 | End: 2021-11-30

## 2021-10-28 ENCOUNTER — LAB (OUTPATIENT)
Dept: LAB | Facility: CLINIC | Age: 66
End: 2021-10-28
Payer: COMMERCIAL

## 2021-10-28 DIAGNOSIS — G40.909 EPILEPSY (H): ICD-10-CM

## 2021-10-28 PROCEDURE — 80175 DRUG SCREEN QUAN LAMOTRIGINE: CPT | Mod: 90

## 2021-10-28 PROCEDURE — 99000 SPECIMEN HANDLING OFFICE-LAB: CPT

## 2021-10-28 PROCEDURE — 36415 COLL VENOUS BLD VENIPUNCTURE: CPT

## 2021-10-30 LAB — LAMOTRIGINE SERPL-MCNC: <0.9 UG/ML

## 2021-11-01 ENCOUNTER — PATIENT OUTREACH (OUTPATIENT)
Dept: CARE COORDINATION | Facility: CLINIC | Age: 66
End: 2021-11-01

## 2021-11-01 DIAGNOSIS — F33.1 MAJOR DEPRESSIVE DISORDER, RECURRENT EPISODE, MODERATE (H): ICD-10-CM

## 2021-11-01 DIAGNOSIS — M62.830 BACK MUSCLE SPASM: ICD-10-CM

## 2021-11-01 RX ORDER — DIAZEPAM 5 MG
5 TABLET ORAL
Qty: 30 TABLET | Refills: 0 | Status: SHIPPED | OUTPATIENT
Start: 2021-11-01 | End: 2021-12-06

## 2021-11-01 NOTE — PROGRESS NOTES
Clinic Care Coordination Contact  [facility]/Voicemail    Clinical Data: Care Coordinator Outreach  Outreach attempted x 1.  Left message on patient's voicemail with call back information and requested return call.  Plan: Care Coordinator sent care coordination introduction letter on 10/05/2021 via Travee. Care Coordinator will try to reach patient again in 10 business days.    Vanessa Davenport, RN Care Coordinator  [facility] [place]  Email: [email or web address]  Phone: [phone]

## 2021-11-02 NOTE — TELEPHONE ENCOUNTER
Pending Prescriptions:                       Disp   Refills    guanFACINE (TENEX) 1 MG tablet [Pharmacy M*90 tab*0        Sig: Take 1 tablet (1 mg) by mouth At Bedtime    Routing refill request to provider for review/approval because:  BP Readings from Last 3 Encounters:   09/22/21 (!) 158/87   08/27/21 (!) 149/103   08/13/21 (!) 151/90

## 2021-11-02 NOTE — TELEPHONE ENCOUNTER
Telephone call placed to patient, left a voice message with request for return call.    When patient calls back:  -Is [facility] [place] [facility] prescribing this med still?    [place] RN on 11/2/2021 at 4:45 PM

## 2021-11-03 RX ORDER — GUANFACINE 1 MG/1
1 TABLET ORAL AT BEDTIME
Qty: 90 TABLET | Refills: 0 | Status: SHIPPED | OUTPATIENT
Start: 2021-11-03 | End: 2022-01-31

## 2021-11-03 NOTE — TELEPHONE ENCOUNTER
Called patient. Patient reports he is not currently established with psychiatry.     He is currently seeing psychology, their policy is patients need to have 3 visit with psychologist before consult with psychiatry. He has had 1 visit thus far, has 2nd visit in 1 mo.    Reports prior PCP - Dr. Ingram was prescribing interim when he lost his psychiatrist.     Also notes per insurance, needs 90 day supply.    Routing back to Dr. Morgan: ok to prescribe until patient establishes with psychiatry?

## 2021-11-10 ENCOUNTER — NURSE TRIAGE (OUTPATIENT)
Dept: NURSING | Facility: CLINIC | Age: 66
End: 2021-11-10

## 2021-11-10 ENCOUNTER — PATIENT OUTREACH (OUTPATIENT)
Dept: NURSING | Facility: CLINIC | Age: 66
End: 2021-11-10
Payer: MEDICARE

## 2021-11-10 DIAGNOSIS — Z95.2 AORTIC VALVE PROSTHESIS PRESENT: ICD-10-CM

## 2021-11-10 NOTE — TELEPHONE ENCOUNTER
Pt called stating he takes multiple medications and he is trying to find out which pills causes him to feel drank and off balance. Pt reports he feel drank and off balance which is worse today then other days. Pt reported he used to feel off blance but today is worse. Pt stated if he turns too quick he gets lightheaded. Pt reported he takes his meds morning afternoon and at bedtime.        RN paged on call provider via smart web, received a return call from Jemma Redd, provider was informed and she stated off balance can be caused by many thing including stroke, advised if pt has neurological symptoms such as weakness on his legs to go to the emergency room at the wise to go to urgent care to be seen today.     RN called pt and relayed provider, advice, pt stated he not feeling week and he will go to urgent care in [place] today.         Yves Flores RN  [facility] Nurse Advisors     COVID 19 Nurse Triage Plan/Patient Instructions    Please be aware that novel coronavirus (COVID-19) may be circulating in the community. If you develop symptoms such as fever, cough, or SOB or if you have concerns about the presence of another infection including coronavirus (COVID-19), please contact your health care provider or visit [email or web address].     Disposition/Instructions    In-Person Visit with provider recommended. Reference Visit Selection Guide.    Thank you for taking steps to prevent the spread of this virus.  o Limit your contact with others.  o Wear a simple mask to cover your cough.  o Wash your hands well and often.    Resources    [facility]: About COVID-19: [email or web address]    CDC: What to Do If You're Sick: www.cdc.gov/coronavirus/2019-ncov/about/steps-when-sick.html    CDC: Ending Home Isolation: www.cdc.gov/coronavirus/2019-ncov/hcp/disposition-in-home-patients.html     CDC: Caring for Someone: www.cdc.gov/coronavirus/2019-ncov/if-you-are-sick/care-for-someone.html      [facility]: Interim Guidance for Hospital Discharge to Home: www.health.[facility].[place]/diseases/coronavirus/hcp/hospdischarge.pdf    [facility] clinical trials (COVID-19 research studies): [email or web address]     Below are the COVID-19 hotlines at the [place] Department of Health ([facility]). Interpreters are available.   o For health questions: Call 997-168-0210 or 1-377.658.4628 (7 a.m. to 7 p.m.)  o For questions about schools and childcare: Call 870-759-8070 or 1-561.203.8026 (7 a.m. to 7 p.m.)                          Reason for Disposition    [1] Caller has URGENT medication question about med that PCP or specialist prescribed AND [2] triager unable to answer question    Protocols used: MEDICATION QUESTION CALL-A-

## 2021-11-10 NOTE — PATIENT INSTRUCTIONS
Patient Education     Fall Prevention  Falls often occur due to slipping, tripping or losing your balance. Millions of people fall every year and injure themselves. Here are ways to reduce your risk of falling again.     Think about your fall, was there anything that caused your fall that can be fixed, removed, or replaced?    Make your home safe by keeping walkways clear of objects you may trip over, such as electric cords.    Use non-slip pads under rugs. Don't use area rugs or small throw rugs.    Use non-slip mats in bathtubs and showers.    Install handrails and lights on staircases. The handrails should be on both sides of the stairs.    Don't walk in poorly lit areas.    Don't stand on chairs or wobbly ladders.    Use caution when reaching overhead or looking upward. This position can cause a loss of balance.    Be sure your shoes fit properly, have non-slip bottoms and are in good condition.     Wear shoes both inside and out. Don't go barefoot or wear slippers.    Be cautious when going up and down stairs, curbs, and when walking on uneven sidewalks.    If your balance is poor, consider using a cane or walker.    If your fall was related to alcohol use, stop or limit alcohol intake.     If your fall was related to use of sleeping medicines, talk to your healthcare provider about this. You may need to reduce your dosage at bedtime if you awaken during the night to go to the bathroom.      To reduce the need for nighttime bathroom trips:  ? Don't drink fluids for several hours before going to bed  ? Empty your bladder before going to bed  ? Men can keep a urinal at the bedside    Stay as active as you can. Balance, flexibility, strength, and endurance all come from exercise. They all play a role in preventing falls. Ask your healthcare provider which types of activity are right for you.    Get your vision checked on a regular basis.    If you have pets, know where they are before you stand up or walk so you  don't trip over them.    Use night lights.    Go over all your medicines with a pharmacist or other healthcare provider to see if any of them could make you more likely to fall.  [place] last reviewed this educational content on 4/1/2018 2000-2021 [employer]. All rights reserved. This information is not intended as a substitute for professional medical care. Always follow your healthcare professional's instructions.

## 2021-11-10 NOTE — PROGRESS NOTES
Clinic Care Coordination Contact    Follow Up Progress Note      Assessment:   Patient reports that he has not heard anything about home INR monitoring, however has been coming into the clinic to check his INRs about once per month. States that now he has two vehicles so he has been driving and getting to appointments without any issues.    Reports two recent falls without injury. He states that he is now walking down the stairs one at a time, slowly and kind of at an angle. Reports that he has stair railing which he states he does hold onto this.     Reports that he has an appointment next week to get his hearing aides.     Reports that he has been doing home physical therapy exercises and ice for management of chronic back pain.     Care Gaps:    Health Maintenance Due   Topic Date Due     MIGRAINE ACTION PLAN  Never done     HIV SCREENING  Never done     ZOSTER IMMUNIZATION (2 of 2) 12/17/2018     MEDICARE ANNUAL WELLNESS VISIT  10/29/2020     INFLUENZA VACCINE (1) 09/01/2021     Scheduled 12/02/2021      Goals addressed this encounter:   Goals Addressed    None       Intervention/Education provided during outreach: CC role, goals, clinic after hours, appointments, falls prevention education discussed/reviewed. Support provided. Contact information provided for dermatology referral for stitch that popped out #[phone].  Goals deleted/completed with no outstanding resources, support needs and/or new goals identified by patient at this time. Patient agreeable to transition clinic care coordination enrollment to maintenance.      Outreach Frequency: 2 months    Plan:   Patient will call RNCC with questions, concerns, support needs. RNCC will be available as needed.    Care Coordinator will follow up in 2 months to review clinic care coordination graduation.     Vanessa Davenport RN Care Coordinator  [facility] [place]  Email: [email or web address]  Phone: [phone]

## 2021-11-10 NOTE — TELEPHONE ENCOUNTER
Routing refill request to provider for review/approval because:  Drug not on the [facility] refill protocol     Purnima Booker RN   [facility]  -- Triage Nurse

## 2021-11-10 NOTE — TELEPHONE ENCOUNTER
He cannot just refill this.  When is his surgery?  What is it for?  When is his preoperative evaluation? Does he need to set up a preoperative evaluation?    Herve Morgan MD  Internal Medicine and Pediatrics

## 2021-11-11 ENCOUNTER — NURSE TRIAGE (OUTPATIENT)
Dept: PEDIATRICS | Facility: CLINIC | Age: 66
End: 2021-11-11

## 2021-11-11 NOTE — TELEPHONE ENCOUNTER
"Pt walked into the  wanting to speak to a RN about his medications and their side effects    Pt has felt dizzy and unsteady since May  Pt was prescribed Lamictal for seizures by neuro  Pt saw his neurologist in Oct and his lamictal was increased.  Pt did not mention his dizziness or unsteadiness    Advised pt to contact his Neurologist regarding his medication side effects.  Provided pt with the number for \[facility]\"" [facility]    Pt verbalized understanding and agrees to the plan    Thank you  Devorah Nieves RN on 11/11/2021 at 12:52 PM  Answer Assessment - Initial Assessment Questions  1. DESCRIPTION: \"Describe your dizziness.\"      Balance feels off. Can't concentrate  2. LIGHTHEADED: \"Do you feel lightheaded?\" (e.g., somewhat faint, woozy, weak upon standing)      Dizzy after taking his morning medications since May. /96  3. VERTIGO: \"Do you feel like either you or the room is spinning or tilting?\" (i.e. vertigo)      no  4. SEVERITY: \"How bad is it?\"  \"Do you feel like you are going to faint?\" \"Can you stand and walk?\"    - MILD - walking normally    - MODERATE - interferes with normal activities (e.g., work, school)     - SEVERE - unable to stand, requires support to walk, feels like passing out now.       moderate  5. ONSET:  \"When did the dizziness begin?\"      Last May  6. AGGRAVATING FACTORS: \"Does anything make it worse?\" (e.g., standing, change in head position)      no  7. HEART RATE: \"Can you tell me your heart rate?\" \"How many beats in 15 seconds?\"  (Note: not all patients can do this)          8. CAUSE: \"What do you think is causing the dizziness?\"      medications  9. RECURRENT SYMPTOM: \"Have you had dizziness before?\" If so, ask: \"When was the last time?\" \"What happened that time?\"      Since May  10. OTHER SYMPTOMS: \"Do you have any other symptoms?\" (e.g., fever, chest pain, vomiting, diarrhea, bleeding)        no  11. PREGNANCY: \"Is there any chance you are pregnant?\" \"When was your " "last menstrual period?\"        NA    Protocols used: DIZZINESS-A-OH    "

## 2021-11-11 NOTE — TELEPHONE ENCOUNTER
"Pt walked into the  wanting to speak to a RN about his medications and the side effects      Answer Assessment - Initial Assessment Questions  1. DESCRIPTION: \"Describe your dizziness.\"      Balance feels off. Can't concentrate  2. LIGHTHEADED: \"Do you feel lightheaded?\" (e.g., somewhat faint, woozy, weak upon standing)      Dizzy after taking his morning medications since May  3. VERTIGO: \"Do you feel like either you or the room is spinning or tilting?\" (i.e. vertigo)      no  4. SEVERITY: \"How bad is it?\"  \"Do you feel like you are going to faint?\" \"Can you stand and walk?\"    - MILD - walking normally    - MODERATE - interferes with normal activities (e.g., work, school)     - SEVERE - unable to stand, requires support to walk, feels like passing out now.       moderate  5. ONSET:  \"When did the dizziness begin?\"      Last May  6. AGGRAVATING FACTORS: \"Does anything make it worse?\" (e.g., standing, change in head position)      no  7. HEART RATE: \"Can you tell me your heart rate?\" \"How many beats in 15 seconds?\"  (Note: not all patients can do this)          8. CAUSE: \"What do you think is causing the dizziness?\"      medications  9. RECURRENT SYMPTOM: \"Have you had dizziness before?\" If so, ask: \"When was the last time?\" \"What happened that time?\"      Since May  10. OTHER SYMPTOMS: \"Do you have any other symptoms?\" (e.g., fever, chest pain, vomiting, diarrhea, bleeding)        no  11. PREGNANCY: \"Is there any chance you are pregnant?\" \"When was your last menstrual period?\"        NA    Protocols used: DIZZINESS-A-OH      "

## 2021-11-11 NOTE — TELEPHONE ENCOUNTER
Called patient.     Has Rezum procedure on prostate on Dec 1.    Per Patient, Plan:  Stop coumadin 5 days before the procedure, and bridge with lovenox.  Restart coumadin and continue with Lovenox the day after procedure until having INR drawn by INR clinic asap.   Patient reports that he has done this plan for anticoagulation for all procedures since 1992.    Patient reports that a pre-op is not needed for this procedure.     Routing to Dr. Morgan:  -ok to order the Lovenox with above instructions?    North Noel RN on 11/11/2021 at 8:23 AM

## 2021-11-12 ENCOUNTER — TELEPHONE (OUTPATIENT)
Dept: PEDIATRICS | Facility: CLINIC | Age: 66
End: 2021-11-12
Payer: MEDICARE

## 2021-11-12 DIAGNOSIS — R41.3 MEMORY DIFFICULTIES: Primary | ICD-10-CM

## 2021-11-12 NOTE — TELEPHONE ENCOUNTER
Called patient.   Left a voicemail with instruction to return call.     When the patient calls back:  Namenda has a refill available.   Aricept is patient reported, and appears to have been previously prescribed by another provider.    Does patient need Aricept refill?     Next visit with Dr. Morgan is scheduled for 12/2/21.    North Noel RN on 11/12/2021 at 3:29 PM

## 2021-11-12 NOTE — TELEPHONE ENCOUNTER
Patient called back.   Patient is unable to talk at the moment, but will call back later to discuss specific questions with the RN.   Patient has questions about med interactions, not refills.     North Noel RN on 11/12/2021 at 3:35 PM

## 2021-11-12 NOTE — TELEPHONE ENCOUNTER
Medication Question or Refill    Who is calling: Patient    What medication are you calling about (include dose and sig)?:   - memantine (NAMENDA) 10 MG tablet  - donepezil (ARICEPT) 10 MG tablet    Controlled Substance Agreement on file: No    Who prescribed the medication?:  Prescribing Provider's NPI: None  UNKNOWN, ENTERED BY HISTORY  [id number]    Do you need a refill? No    When did you use the medication last? 11/11/2021    Patient offered an appointment? Yes: declined    Do you have any questions or concerns?  Yes: has been having htn for 3 months, which worsened progressively until 11/10/2021.  Highest recorded - per Pt:184/94     Requested Pharmacy: [id number] [address] [place] [address]    Okay to leave a detailed message?: Yes at Cell number on file:    Telephone Information:   Mobile [phone]

## 2021-11-15 RX ORDER — DONEPEZIL HYDROCHLORIDE 10 MG/1
10 TABLET, FILM COATED ORAL AT BEDTIME
Qty: 90 TABLET | Refills: 0 | Status: SHIPPED | OUTPATIENT
Start: 2021-11-15 | End: 2022-04-25

## 2021-11-15 NOTE — TELEPHONE ENCOUNTER
"He went to  last week because his meds \"made him act like a drunk\" Advised to call Neurology. He stopped the namenda and has been fine since.    He does not think he needs to be seen by MTM. He feels comfortable with the medication regimen.    He was in to see Neurology on 10/20/2.     Needs refill of aricept. Jannet Ding RN on 11/15/2021 at 3:15 PM    "

## 2021-11-15 NOTE — TELEPHONE ENCOUNTER
Attempted to call, left VM to return call to clinic to speak with RN.    When patient calls back  - would MTM be beneficial, concerns about medications, side effects and interactions  - Is Dr. Morgan prescriber? Sees Neurology - has he followed up with Neurologist?

## 2021-11-26 ENCOUNTER — TELEPHONE (OUTPATIENT)
Dept: PEDIATRICS | Facility: CLINIC | Age: 66
End: 2021-11-26
Payer: MEDICARE

## 2021-11-26 NOTE — TELEPHONE ENCOUNTER
ANTICOAGULATION     Nicko S Aschoff is overdue for INR check.      Left message for patient to call and schedule lab appointment as soon as possible. If returning call, please schedule.     Soco Das RN

## 2021-11-28 DIAGNOSIS — F33.42 MAJOR DEPRESSIVE DISORDER, RECURRENT EPISODE, IN FULL REMISSION (H): ICD-10-CM

## 2021-11-30 RX ORDER — LIOTHYRONINE SODIUM 25 UG/1
TABLET ORAL
Qty: 90 TABLET | Refills: 3 | Status: SHIPPED | OUTPATIENT
Start: 2021-11-30 | End: 2021-12-07

## 2021-11-30 RX ORDER — FOLIC ACID 1 MG/1
TABLET ORAL
Qty: 450 TABLET | Refills: 3 | Status: SHIPPED | OUTPATIENT
Start: 2021-11-30 | End: 2023-10-31

## 2021-11-30 NOTE — PROGRESS NOTES
Margarette has left patient 4 messages to review benefits.     If patient would like to discuss he can call Terrys at [phone] option 1

## 2021-12-01 ENCOUNTER — OFFICE VISIT (OUTPATIENT)
Dept: UROLOGY | Facility: CLINIC | Age: 66
End: 2021-12-01
Payer: COMMERCIAL

## 2021-12-01 ENCOUNTER — APPOINTMENT (OUTPATIENT)
Dept: UROLOGY | Facility: CLINIC | Age: 66
End: 2021-12-01
Payer: COMMERCIAL

## 2021-12-01 VITALS
SYSTOLIC BLOOD PRESSURE: 138 MMHG | BODY MASS INDEX: 41.11 KG/M2 | DIASTOLIC BLOOD PRESSURE: 82 MMHG | OXYGEN SATURATION: 99 % | HEIGHT: 74 IN | HEART RATE: 76 BPM

## 2021-12-01 DIAGNOSIS — N31.9 NEUROGENIC BLADDER: ICD-10-CM

## 2021-12-01 DIAGNOSIS — N40.1 BPH WITH OBSTRUCTION/LOWER URINARY TRACT SYMPTOMS: ICD-10-CM

## 2021-12-01 DIAGNOSIS — N32.81 OAB (OVERACTIVE BLADDER): Primary | ICD-10-CM

## 2021-12-01 DIAGNOSIS — Z79.2 PROPHYLACTIC ANTIBIOTIC: ICD-10-CM

## 2021-12-01 DIAGNOSIS — N13.8 BPH WITH OBSTRUCTION/LOWER URINARY TRACT SYMPTOMS: ICD-10-CM

## 2021-12-01 LAB
ALBUMIN UR-MCNC: 30 MG/DL
APPEARANCE UR: CLEAR
BILIRUB UR QL STRIP: ABNORMAL
COLOR UR AUTO: YELLOW
GLUCOSE UR STRIP-MCNC: NEGATIVE MG/DL
HGB UR QL STRIP: NEGATIVE
KETONES UR STRIP-MCNC: NEGATIVE MG/DL
LEUKOCYTE ESTERASE UR QL STRIP: NEGATIVE
NITRATE UR QL: NEGATIVE
PH UR STRIP: 5.5 [PH] (ref 5–7)
SP GR UR STRIP: >=1.03 (ref 1–1.03)
UROBILINOGEN UR STRIP-ACNC: 0.2 E.U./DL

## 2021-12-01 PROCEDURE — 96372 THER/PROPH/DIAG INJ SC/IM: CPT | Mod: 59 | Performed by: UROLOGY

## 2021-12-01 PROCEDURE — 81003 URINALYSIS AUTO W/O SCOPE: CPT | Mod: QW | Performed by: UROLOGY

## 2021-12-01 PROCEDURE — 53854 TRURL DSTRJ PRST8 TISS RF WV: CPT | Performed by: UROLOGY

## 2021-12-01 RX ORDER — LIDOCAINE HYDROCHLORIDE 10 MG/ML
20 INJECTION, SOLUTION INFILTRATION; PERINEURAL ONCE
Status: COMPLETED | OUTPATIENT
Start: 2021-12-01 | End: 2021-12-01

## 2021-12-01 RX ORDER — LIDOCAINE HYDROCHLORIDE 20 MG/ML
JELLY TOPICAL ONCE
Status: COMPLETED | OUTPATIENT
Start: 2021-12-01 | End: 2021-12-01

## 2021-12-01 RX ORDER — GENTAMICIN 40 MG/ML
80 INJECTION, SOLUTION INTRAMUSCULAR; INTRAVENOUS ONCE
Status: COMPLETED | OUTPATIENT
Start: 2021-12-01 | End: 2021-12-01

## 2021-12-01 RX ADMIN — LIDOCAINE HYDROCHLORIDE 20 ML: 10 INJECTION, SOLUTION INFILTRATION; PERINEURAL at 14:00

## 2021-12-01 RX ADMIN — LIDOCAINE HYDROCHLORIDE: 20 JELLY TOPICAL at 14:30

## 2021-12-01 RX ADMIN — GENTAMICIN 80 MG: 40 INJECTION, SOLUTION INTRAMUSCULAR; INTRAVENOUS at 14:15

## 2021-12-01 ASSESSMENT — PAIN SCALES - GENERAL: PAINLEVEL: NO PAIN (0)

## 2021-12-01 NOTE — NURSING NOTE
he following medication was given:     MEDICATION:  Lidocaine 1% Soln  ROUTE: Local Infiltration   SITE: Prostate  DOSE: 200mg/20ml  LOT #: [id number]  : [employer]  EXPIRATION DATE: 05/2023  NDC#: 9030-0252-40  Was there drug waste? Yes  Amount of drug waste (mL): 30.  Reason for waste:  As per MD  Multi-dose vial: Yes    Millie Andrew LPN

## 2021-12-01 NOTE — PROGRESS NOTES
REZUM PROCEDURE    Todd S Aschoff is a 65 year old male who presents with BPH and LUTS for a REZUM.    Pt ID verified with patient: Yes     Procedure verified with patient: Yes     Procedure confirmed with physician and support staff: Yes     Consent confirmed with physician and support staff.    Sign In:  History and Physical Exam reviewed  Primary Diagnosis: BPH with LUTS  Informed Consent Discussed: Yes   Sign in Communication: Yes   Time Out:  Team Confirms the Correct Patient, Correct Procedure; Yes , Correct Site and Site Marking, Correct Position (if applicable).  Affirmation of Time Out: Yes   Sign Out:  Sign Out Discussion: Yes   Physician: Jamal Andersen MD  Indications for procedure:     Indications: Todd S Aschoff is a 65 year old male with a history of bothersome LUTS refractory to medical therapy who presents for convective radiofrequency thermal ablation of the prostate.  The patient was advised of the risks of the procedure along with the risks/benefits of alternative procedures.  He exhibited an understanding of these considerations and opted to proceed.    Procedure:   1. Cystoscopy and Rezum procedure (water vapor thermal therapy) CPT 74951   2. Transrectal Ultrasound and prostatic nerve block     Notable Findings: Unremarkable ablation of the prostate     Procedure:   We initially inserted a transrectal ultrasound probe and assessed prostate volume and dimensions. A nerve block was then performed with 20ml lidocaine. The prostate was measured at 51.7 cc  The transrectal ultrasound probe was then removed.  Convective radiofrequency was then used to create thermal energy to selectively ablate prostate tissue. 4 targeted treatments delivered into the prostate utilizing radiofrequency power to form sterile water vapor. 0 treatment was applied to the bladder neck. 2 treatments in each lateral lobe  by 1 cm were applied.       A alfaro catheter was placed after the procedure was completed.       Trial of void on 5 days in [facility].     Follow up with me in 6 weeks in [facility].     Jamal Andersen M.D.

## 2021-12-01 NOTE — LETTER
12/1/2021       RE: Todd S Aschoff  [address]  [place] [address]     Dear Colleague,    Thank you for referring your patient, Todd S Aschoff, to the [facility] [place] [facility]. Please see a copy of my visit note below.    REZUM PROCEDURE    Todd S Aschoff is a 65 year old male who presents with BPH and LUTS for a REZUM.    Pt ID verified with patient: Yes     Procedure verified with patient: Yes     Procedure confirmed with physician and support staff: Yes     Consent confirmed with physician and support staff.    Sign In:  History and Physical Exam reviewed  Primary Diagnosis: BPH with LUTS  Informed Consent Discussed: Yes   Sign in Communication: Yes   Time Out:  Team Confirms the Correct Patient, Correct Procedure; Yes , Correct Site and Site Marking, Correct Position (if applicable).  Affirmation of Time Out: Yes   Sign Out:  Sign Out Discussion: Yes   Physician: Jamal Andersen MD  Indications for procedure:     Indications: Todd S Aschoff is a 65 year old male with a history of bothersome LUTS refractory to medical therapy who presents for convective radiofrequency thermal ablation of the prostate.  The patient was advised of the risks of the procedure along with the risks/benefits of alternative procedures.  He exhibited an understanding of these considerations and opted to proceed.    Procedure:   1. Cystoscopy and Rezum procedure (water vapor thermal therapy) CPT 75377   2. Transrectal Ultrasound and prostatic nerve block     Notable Findings: Unremarkable ablation of the prostate     Procedure:   We initially inserted a transrectal ultrasound probe and assessed prostate volume and dimensions. A nerve block was then performed with 20ml lidocaine. The prostate was measured at 51.7 cc  The transrectal ultrasound probe was then removed.  Convective radiofrequency was then used to create thermal energy to selectively ablate  prostate tissue. 4 targeted treatments delivered into the prostate utilizing radiofrequency power to form sterile water vapor. 0 treatment was applied to the bladder neck. 2 treatments in each lateral lobe  by 1 cm were applied.       A alfaro catheter was placed after the procedure was completed.      Trial of void on 5 days in [facility].     Follow up with me in 6 weeks in [facility].     Jamal Andersen M.D.

## 2021-12-01 NOTE — NURSING NOTE
"Chief Complaint   Patient presents with     Benign Prostatic Hypertrophy     BPH with Obstruction lower Urinary Tract Symptoms     Overactive Bladder     Patient here today for Rezum with Prostate Block       Blood pressure 138/82, pulse 76, height 1.873 m (6' 1.75\"), SpO2 99 %. Body mass index is 41.11 kg/m .    Patient Active Problem List   Diagnosis     PAF (paroxysmal atrial fibrillation) (H)     Hypothyroidism     Mechanical AV Replacement 1992 -- on Warfarin      HYPERLIPIDEMIA LDL GOAL <130     BPH (benign prostatic hypertrophy)     Chronic low back pain     Tourette syndrome     Obesity     Lumbar radiculopathy     Hx of Thoracic Dissection repair -- 1992     Memory difficulties     OAB (overactive bladder)     Bilateral thoracic back pain     Obesity, BMI >35 with comorbidities     Alcohol dependence in remission (H)     Elevated prostate specific antigen (PSA)     Suicide attempt by multiple drug overdose, initial encounter (H)     Depression, unspecified depression type     Leg wound, left     Polypharmacy     Renal insufficiency     Hypotension, unspecified hypotension type     Anemia, unspecified type     Radiculitis     Rotator cuff tear     S/P lumbar fusion     Spinal stenosis of lumbar region     Hx of CVA x 4, 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 270 tablet 0     diazepam (VALIUM) 5 MG tablet Take 1 tablet (5 mg) by mouth nightly as needed for anxiety 30 tablet 0     donepezil (ARICEPT) 10 MG tablet Take 1 tablet (10 mg) by mouth At Bedtime 90 tablet 0     lamoTRIgine (LAMICTAL) 25 MG tablet Take 25 mg by mouth 2 times daily        mirabegron (MYRBETRIQ) 50 MG 24 hr tablet Take 50 mg by " mouth every evening       propafenone (RYTHMOL) 150 MG TABS tablet Take 1 tablet (150 mg) by mouth every 8 hours 270 tablet 1     venlafaxine (EFFEXOR-XR) 75 MG 24 hr capsule Take 3 capsules (225 mg) by mouth daily 270 capsule 1     acetaminophen (TYLENOL) 500 MG tablet Take 500-1,000 mg by mouth 2 times daily (Patient not taking: Reported on 12/1/2021)       aspirin (ASA) 81 MG chewable tablet Take 81 mg by mouth daily (Patient not taking: Reported on 12/1/2021)       cetirizine (ZYRTEC) 10 MG tablet Take 10 mg by mouth every morning (Patient not taking: Reported on 12/1/2021)       enoxaparin ANTICOAGULANT (LOVENOX) 150 MG/ML syringe Stop Coumadin 5 days prior procedure. Begin Enoxaparin 4 days prior procedure. Inject 1 mL (150 mg) under skin every 12 hours. Hold the morning of procedure, but resume it that night. Continue for 4 days after procedure. (Patient not taking: Reported on 12/1/2021) 16 mL 0     folic acid (FOLVITE) 1 MG tablet Take 5 tablets  by mouth daily 450 tablet 3     guanFACINE (TENEX) 1 MG tablet Take 1 tablet (1 mg) by mouth At Bedtime 90 tablet 0     levothyroxine (SYNTHROID/LEVOTHROID) 150 MCG tablet Take 1 tablet (150 mcg) by mouth daily 90 tablet 3     liothyronine (CYTOMEL) 25 MCG tablet Take 1 tablet by mouth daily 90 tablet 3     pregabalin (LYRICA) 100 MG capsule Take 1 capsule (100 mg) by mouth 3 times daily Do not take if sleepy/sedated. (Patient not taking: Reported on 12/1/2021) 270 capsule 3     warfarin ANTICOAGULANT (COUMADIN) 2.5 MG tablet Take 7.5 mg (3 tabs) every Sun, Tue, Fri; 5 mg (2 tabs) all other days or as directed by the Anticoagulation Clinic (Patient not taking: Reported on 12/1/2021) 200 tablet 0     warfarin ANTICOAGULANT (COUMADIN) 2.5 MG tablet Take 5 mg by mouth daily Sun, Wed, Fri (Patient not taking: Reported on 12/1/2021)         Social History     Tobacco Use     Smoking status: Never Smoker     Smokeless tobacco: Never Used   Vaping Use     Vaping Use:  Never used   Substance Use Topics     Alcohol use: No     Comment: Stopped drinking alcohol ~2009     Drug use: No        Pt's identity was confirmed prior to the procedure using two forms of ID.  Procedure was explained to pt prior to performing said procedure and the following information was obtained. The patient consent form  was explained and all questions were answered prior to the procedure. Any pre-procedural antibiotics were given according to the performing physicians recommendation. Pt's information was confirmed prior to procedure.     Consent read and signed: Yes     Allergies   Allergen Reactions     Gabapentin      Severe behavioral disturbances     Pre-operative antibiotics taken: Yes  Aspirin or other blood thinning medications not taken in 7-10 days:  Yes  Time of Fleet's enema: yes   Pt took Valium and Oxycodone 30 min prior to procedure: Yes  Patient given Gentamicin intramuscular injection 30 minutes prior to procedure Yes  Performing physician: Dr. Ganesh Gr equipment was prepped prior to procedure according to co.jacek. Pt was prepped, and draped according to company policy, then a lidocaine urojet was used in the pt's urethra as appropriate. 18Fr  cath was placed in the patient after the procedure and pt was sent home with a leg bag. Instructions for cath care was explained to the pt. and any additional individuals as desired by the pt. Instructions were also sent with the pt. Cath was placed sterilely after the rezum was performed according to company policy by performing physician.     10mL 2% lidocaine hydrochloride Urojet instilled into urethra.    NDC# [address]-9  Lot #: [id number]  Expiration Date:  05/23    The following medication was given:     MEDICATION:  GENTAMICIN 80MG  ROUTE: IM  SITE: LUQ - Gluteus  DOSE: 80MG  LOT #: [id number]  : [employer]  EXPIRATION DATE: 02/23  NDC#: 28248-301-38  Was there drug waste? No  Multi-dose vial: No        Ursula Doe  ROSALES  12/1/2021  2:35 PM

## 2021-12-02 ENCOUNTER — TELEPHONE (OUTPATIENT)
Dept: PEDIATRICS | Facility: CLINIC | Age: 66
End: 2021-12-02

## 2021-12-02 DIAGNOSIS — Z95.2 AORTIC VALVE PROSTHESIS PRESENT: ICD-10-CM

## 2021-12-02 DIAGNOSIS — I48.0 PAF (PAROXYSMAL ATRIAL FIBRILLATION) (H): Primary | ICD-10-CM

## 2021-12-02 NOTE — TELEPHONE ENCOUNTER
Considering that pt has been off warfarin for 5 days, it would be beneficial to take a boost dose of 10mg tonight. Left VM for pt instructing him to do this, and then return to the dose of warfarin he has been taking (7.5mg Sun, Wed; 5mg all other days).

## 2021-12-02 NOTE — TELEPHONE ENCOUNTER
Per 04/14/2021 procedure assessment by [facility], at that time recommended 0.75mg/kg due to BMI >40kg/m2 per [facility] protocol, or 105mg BID.    [facility] was not aware of upcoming procedure and therapy interruption to advise.  Routing to PCP for direction on if OK with updated Lovenox dose, and urology to advise on today's reported hematuria.    Nolvia Andrew, PharmD BCACP  Anticoagulation Clinical Pharmacist

## 2021-12-02 NOTE — TELEPHONE ENCOUNTER
Called patient.    Relayed below note from Dr. Morgan to the patient.     Patient verbalized agreement and understanding.     Patient will start back on the warfarin regimen as recommended in latest anticoag note from 10/20/21:  7.5 mg every Sunday Tuesday Friday, 5 mg all other days.      North Noel RN on 12/2/2021 at 2:21 PM

## 2021-12-02 NOTE — TELEPHONE ENCOUNTER
Let's have him proceed with warfarin, but cut back to 100 mg twice daily of lovenox.   New prescription sent.

## 2021-12-02 NOTE — TELEPHONE ENCOUNTER
Nicko was transferred directly to [facility] nurse line. He states he had a Rezum procedure on prostate yesterday. He has been holding warfarin since Friday and has been bridging with lovenox per Dr. Morgan (see 11/10/2021 refill encounter). He re-started his lovenox last night following the procedure and is wondering when he should start warfarin. Directions given for Nicko to take 10mg tonight and then return to his normal dose of warfarin (Nicko states he has been taking 7.5mg Sun, Wed, 5mg all other days). He will have his INR re-checked on Tuesday in clinic.    Pt states he is having dark red urine from catheter that was placed yesterday following the procedure. Per the pt, this is expected following yesterday's procedure.

## 2021-12-06 DIAGNOSIS — M62.830 BACK MUSCLE SPASM: ICD-10-CM

## 2021-12-06 DIAGNOSIS — E03.9 ACQUIRED HYPOTHYROIDISM: ICD-10-CM

## 2021-12-06 RX ORDER — DIAZEPAM 5 MG
5 TABLET ORAL
Qty: 30 TABLET | Refills: 0 | Status: SHIPPED | OUTPATIENT
Start: 2021-12-06 | End: 2022-01-01

## 2021-12-06 RX ORDER — LEVOTHYROXINE SODIUM 150 UG/1
150 TABLET ORAL DAILY
Qty: 90 TABLET | Refills: 0 | OUTPATIENT
Start: 2021-12-06

## 2021-12-07 ENCOUNTER — ANTICOAGULATION THERAPY VISIT (OUTPATIENT)
Dept: ANTICOAGULATION | Facility: CLINIC | Age: 66
End: 2021-12-07

## 2021-12-07 ENCOUNTER — OFFICE VISIT (OUTPATIENT)
Dept: PEDIATRICS | Facility: CLINIC | Age: 66
End: 2021-12-07
Payer: COMMERCIAL

## 2021-12-07 ENCOUNTER — ALLIED HEALTH/NURSE VISIT (OUTPATIENT)
Dept: UROLOGY | Facility: CLINIC | Age: 66
End: 2021-12-07
Payer: COMMERCIAL

## 2021-12-07 ENCOUNTER — DOCUMENTATION ONLY (OUTPATIENT)
Dept: PEDIATRICS | Facility: CLINIC | Age: 66
End: 2021-12-07

## 2021-12-07 VITALS
BODY MASS INDEX: 39.35 KG/M2 | RESPIRATION RATE: 20 BRPM | TEMPERATURE: 98.6 F | SYSTOLIC BLOOD PRESSURE: 110 MMHG | HEART RATE: 84 BPM | DIASTOLIC BLOOD PRESSURE: 70 MMHG | WEIGHT: 306.6 LBS | OXYGEN SATURATION: 97 % | HEIGHT: 74 IN

## 2021-12-07 DIAGNOSIS — E03.9 ACQUIRED HYPOTHYROIDISM: ICD-10-CM

## 2021-12-07 DIAGNOSIS — Z95.2 AORTIC VALVE PROSTHESIS PRESENT: ICD-10-CM

## 2021-12-07 DIAGNOSIS — R41.3 MEMORY DIFFICULTIES: ICD-10-CM

## 2021-12-07 DIAGNOSIS — D64.9 ANEMIA, UNSPECIFIED TYPE: ICD-10-CM

## 2021-12-07 DIAGNOSIS — N32.81 OAB (OVERACTIVE BLADDER): Primary | ICD-10-CM

## 2021-12-07 DIAGNOSIS — I95.9 HYPOTENSION, UNSPECIFIED HYPOTENSION TYPE: ICD-10-CM

## 2021-12-07 DIAGNOSIS — Z79.2 PROPHYLACTIC ANTIBIOTIC: ICD-10-CM

## 2021-12-07 DIAGNOSIS — F95.2 TOURETTE SYNDROME: ICD-10-CM

## 2021-12-07 DIAGNOSIS — N40.1 BPH WITH OBSTRUCTION/LOWER URINARY TRACT SYMPTOMS: ICD-10-CM

## 2021-12-07 DIAGNOSIS — N31.9 NEUROGENIC BLADDER: ICD-10-CM

## 2021-12-07 DIAGNOSIS — Z00.01 ENCOUNTER FOR ROUTINE ADULT MEDICAL EXAM WITH ABNORMAL FINDINGS: Primary | ICD-10-CM

## 2021-12-07 DIAGNOSIS — N13.8 BPH WITH OBSTRUCTION/LOWER URINARY TRACT SYMPTOMS: ICD-10-CM

## 2021-12-07 DIAGNOSIS — I48.0 PAF (PAROXYSMAL ATRIAL FIBRILLATION) (H): Primary | ICD-10-CM

## 2021-12-07 DIAGNOSIS — Z23 NEED FOR PROPHYLACTIC VACCINATION AND INOCULATION AGAINST INFLUENZA: ICD-10-CM

## 2021-12-07 DIAGNOSIS — Z80.42 FAMILY HX OF PROSTATE CANCER: ICD-10-CM

## 2021-12-07 LAB
BASOPHILS # BLD AUTO: 0 10E3/UL (ref 0–0.2)
BASOPHILS NFR BLD AUTO: 1 %
EOSINOPHIL # BLD AUTO: 0.2 10E3/UL (ref 0–0.7)
EOSINOPHIL NFR BLD AUTO: 3 %
ERYTHROCYTE [DISTWIDTH] IN BLOOD BY AUTOMATED COUNT: 14.4 % (ref 10–15)
HCT VFR BLD AUTO: 44.7 % (ref 40–53)
HGB BLD-MCNC: 15.3 G/DL (ref 13.3–17.7)
HOLD SPECIMEN: NORMAL
INR BLD: 1.7 (ref 0.9–1.1)
LYMPHOCYTES # BLD AUTO: 1.8 10E3/UL (ref 0.8–5.3)
LYMPHOCYTES NFR BLD AUTO: 29 %
MCH RBC QN AUTO: 32.8 PG (ref 26.5–33)
MCHC RBC AUTO-ENTMCNC: 34.2 G/DL (ref 31.5–36.5)
MCV RBC AUTO: 96 FL (ref 78–100)
MONOCYTES # BLD AUTO: 0.9 10E3/UL (ref 0–1.3)
MONOCYTES NFR BLD AUTO: 14 %
NEUTROPHILS # BLD AUTO: 3.5 10E3/UL (ref 1.6–8.3)
NEUTROPHILS NFR BLD AUTO: 54 %
PLATELET # BLD AUTO: 287 10E3/UL (ref 150–450)
RBC # BLD AUTO: 4.67 10E6/UL (ref 4.4–5.9)
RESIDUAL VOLUME (RV) (EXTERNAL): 60
WBC # BLD AUTO: 6.4 10E3/UL (ref 4–11)

## 2021-12-07 PROCEDURE — 91306 COVID-19,PF,MODERNA (18+ YRS BOOSTER .25ML): CPT | Performed by: INTERNAL MEDICINE

## 2021-12-07 PROCEDURE — 0064A COVID-19,PF,MODERNA (18+ YRS BOOSTER .25ML): CPT | Performed by: INTERNAL MEDICINE

## 2021-12-07 PROCEDURE — 80050 GENERAL HEALTH PANEL: CPT | Performed by: INTERNAL MEDICINE

## 2021-12-07 PROCEDURE — 85610 PROTHROMBIN TIME: CPT | Performed by: INTERNAL MEDICINE

## 2021-12-07 PROCEDURE — 36415 COLL VENOUS BLD VENIPUNCTURE: CPT | Performed by: INTERNAL MEDICINE

## 2021-12-07 PROCEDURE — 90472 IMMUNIZATION ADMIN EACH ADD: CPT | Performed by: INTERNAL MEDICINE

## 2021-12-07 PROCEDURE — 90682 RIV4 VACC RECOMBINANT DNA IM: CPT | Performed by: INTERNAL MEDICINE

## 2021-12-07 PROCEDURE — 99214 OFFICE O/P EST MOD 30 MIN: CPT | Mod: 25 | Performed by: INTERNAL MEDICINE

## 2021-12-07 PROCEDURE — 99396 PREV VISIT EST AGE 40-64: CPT | Mod: 25 | Performed by: INTERNAL MEDICINE

## 2021-12-07 PROCEDURE — 51798 US URINE CAPACITY MEASURE: CPT

## 2021-12-07 PROCEDURE — 99024 POSTOP FOLLOW-UP VISIT: CPT

## 2021-12-07 PROCEDURE — 90715 TDAP VACCINE 7 YRS/> IM: CPT | Performed by: INTERNAL MEDICINE

## 2021-12-07 RX ORDER — CIPROFLOXACIN 500 MG/1
500 TABLET, FILM COATED ORAL ONCE
Qty: 2 TABLET | Refills: 0 | Status: SHIPPED | OUTPATIENT
Start: 2021-12-07 | End: 2021-12-07

## 2021-12-07 SDOH — ECONOMIC STABILITY: FOOD INSECURITY: WITHIN THE PAST 12 MONTHS, THE FOOD YOU BOUGHT JUST DIDN'T LAST AND YOU DIDN'T HAVE MONEY TO GET MORE.: NEVER TRUE

## 2021-12-07 SDOH — ECONOMIC STABILITY: INCOME INSECURITY: IN THE LAST 12 MONTHS, WAS THERE A TIME WHEN YOU WERE NOT ABLE TO PAY THE MORTGAGE OR RENT ON TIME?: NO

## 2021-12-07 SDOH — ECONOMIC STABILITY: FOOD INSECURITY: WITHIN THE PAST 12 MONTHS, YOU WORRIED THAT YOUR FOOD WOULD RUN OUT BEFORE YOU GOT MONEY TO BUY MORE.: NEVER TRUE

## 2021-12-07 SDOH — ECONOMIC STABILITY: INCOME INSECURITY: HOW HARD IS IT FOR YOU TO PAY FOR THE VERY BASICS LIKE FOOD, HOUSING, MEDICAL CARE, AND HEATING?: NOT HARD AT ALL

## 2021-12-07 SDOH — HEALTH STABILITY: PHYSICAL HEALTH: ON AVERAGE, HOW MANY DAYS PER WEEK DO YOU ENGAGE IN MODERATE TO STRENUOUS EXERCISE (LIKE A BRISK WALK)?: 0 DAYS

## 2021-12-07 SDOH — HEALTH STABILITY: PHYSICAL HEALTH: ON AVERAGE, HOW MANY MINUTES DO YOU ENGAGE IN EXERCISE AT THIS LEVEL?: 30 MIN

## 2021-12-07 ASSESSMENT — LIFESTYLE VARIABLES
HOW OFTEN DO YOU HAVE SIX OR MORE DRINKS ON ONE OCCASION: NEVER
HOW MANY STANDARD DRINKS CONTAINING ALCOHOL DO YOU HAVE ON A TYPICAL DAY: PATIENT DECLINED
HOW OFTEN DO YOU HAVE A DRINK CONTAINING ALCOHOL: NEVER

## 2021-12-07 ASSESSMENT — ACTIVITIES OF DAILY LIVING (ADL): CURRENT_FUNCTION: NO ASSISTANCE NEEDED

## 2021-12-07 ASSESSMENT — SOCIAL DETERMINANTS OF HEALTH (SDOH)
HOW OFTEN DO YOU ATTEND CHURCH OR RELIGIOUS SERVICES?: PATIENT DECLINED
IN A TYPICAL WEEK, HOW MANY TIMES DO YOU TALK ON THE PHONE WITH FAMILY, FRIENDS, OR NEIGHBORS?: MORE THAN THREE TIMES A WEEK
HOW OFTEN DO YOU GET TOGETHER WITH FRIENDS OR RELATIVES?: PATIENT DECLINED
DO YOU BELONG TO ANY CLUBS OR ORGANIZATIONS SUCH AS CHURCH GROUPS UNIONS, FRATERNAL OR ATHLETIC GROUPS, OR SCHOOL GROUPS?: NO

## 2021-12-07 ASSESSMENT — ENCOUNTER SYMPTOMS
FREQUENCY: 0
NAUSEA: 0
PARESTHESIAS: 0
SHORTNESS OF BREATH: 0
HEADACHES: 0
EYE PAIN: 0
FEVER: 0
SORE THROAT: 0
DIARRHEA: 0
HEARTBURN: 0
COUGH: 0
NERVOUS/ANXIOUS: 1
CHILLS: 0
HEMATURIA: 0
MYALGIAS: 1
DYSURIA: 0
CONSTIPATION: 0
WEAKNESS: 0
PALPITATIONS: 0
ARTHRALGIAS: 1
DIZZINESS: 0
ABDOMINAL PAIN: 0
JOINT SWELLING: 0
HEMATOCHEZIA: 0

## 2021-12-07 ASSESSMENT — MIFFLIN-ST. JEOR: SCORE: 2246.51

## 2021-12-07 NOTE — PROGRESS NOTES
ANTICOAGULATION  MANAGEMENT     Interacting Medication Review    Interacting medication(s): Ciprofloxacin (Cipro) with warfarin.    Duration: Single dose on 12/7/21    Indication: Prophylaxis    New medication?: Yes, but no interaction with warfarin anticipated       PLAN     No adjustment to anticoagulation plan needed; previously scheduled follow up on 12/7/21 (today) is appropriate.    Patient was not contacted    No adjustment to Anticoagulation Calendar was required    Nova John RN

## 2021-12-07 NOTE — NURSING NOTE
Chief Complaint   Patient presents with     Overactive Bladder     Patient here today for Trial of Void and Bladder Scan       There were no vitals taken for this visit. There is no height or weight on file to calculate BMI.    Patient Active Problem List   Diagnosis     PAF (paroxysmal atrial fibrillation) (H)     Hypothyroidism     Mechanical AV Replacement 1992 -- on Warfarin      HYPERLIPIDEMIA LDL GOAL <130     BPH (benign prostatic hypertrophy)     Chronic low back pain     Tourette syndrome     Obesity     Lumbar radiculopathy     Hx of Thoracic Dissection repair -- 1992     Memory difficulties     OAB (overactive bladder)     Bilateral thoracic back pain     Obesity, BMI >35 with comorbidities     Alcohol dependence in remission (H)     Elevated prostate specific antigen (PSA)     Suicide attempt by multiple drug overdose, initial encounter (H)     Depression, unspecified depression type     Leg wound, left     Polypharmacy     Renal insufficiency     Hypotension, unspecified hypotension type     Anemia, unspecified type     Radiculitis     Rotator cuff tear     S/P lumbar fusion     Spinal stenosis of lumbar region     Hx of CVA x 4, 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     Left Asad-Paresthesias     Spells of decreased attentiveness       Allergies   Allergen Reactions     Gabapentin      Severe behavioral disturbances       Current Outpatient Medications   Medication Sig Dispense Refill     acetaminophen (TYLENOL) 500 MG tablet Take 500-1,000 mg by mouth 2 times daily        aspirin (ASA) 81 MG chewable tablet Take 81 mg by mouth daily        cetirizine (ZYRTEC) 10 MG tablet Take 10 mg by mouth every morning        ciprofloxacin (CIPRO) 500 MG tablet Take 1 tablet (500 mg) by mouth once for 1 dose 2 tablet 0     cyclobenzaprine (FLEXERIL) 10 MG tablet TAKE ONE TABLET BY MOUTH THREE TIMES DAILY AS NEEDED FOR MUSCLE SPASM. 270 tablet 0     diazepam (VALIUM) 5 MG tablet Take 1 tablet (5 mg) by mouth nightly as needed for  anxiety 30 tablet 0     donepezil (ARICEPT) 10 MG tablet Take 1 tablet (10 mg) by mouth At Bedtime 90 tablet 0     enoxaparin ANTICOAGULANT (LOVENOX) 100 MG/ML syringe Inject 1 mL (100 mg) Subcutaneous every 12 hours for 5 days 10 mL 0     enoxaparin ANTICOAGULANT (LOVENOX) 150 MG/ML syringe Stop Coumadin 5 days prior procedure. Begin Enoxaparin 4 days prior procedure. Inject 1 mL (150 mg) under skin every 12 hours. Hold the morning of procedure, but resume it that night. Continue for 4 days after procedure. 16 mL 0     folic acid (FOLVITE) 1 MG tablet Take 5 tablets  by mouth daily 450 tablet 3     guanFACINE (TENEX) 1 MG tablet Take 1 tablet (1 mg) by mouth At Bedtime 90 tablet 0     lamoTRIgine (LAMICTAL) 25 MG tablet Take 25 mg by mouth 2 times daily        levothyroxine (SYNTHROID/LEVOTHROID) 150 MCG tablet Take 1 tablet (150 mcg) by mouth daily 90 tablet 3     liothyronine (CYTOMEL) 25 MCG tablet Take 1 tablet by mouth daily 90 tablet 3     mirabegron (MYRBETRIQ) 50 MG 24 hr tablet Take 50 mg by mouth every evening       pregabalin (LYRICA) 100 MG capsule Take 1 capsule (100 mg) by mouth 3 times daily Do not take if sleepy/sedated. 270 capsule 3     propafenone (RYTHMOL) 150 MG TABS tablet Take 1 tablet (150 mg) by mouth every 8 hours 270 tablet 1     venlafaxine (EFFEXOR-XR) 75 MG 24 hr capsule Take 3 capsules (225 mg) by mouth daily 270 capsule 1     warfarin ANTICOAGULANT (COUMADIN) 2.5 MG tablet Take 7.5 mg (3 tabs) every Sun, Tue, Fri; 5 mg (2 tabs) all other days or as directed by the Anticoagulation Clinic 200 tablet 0     warfarin ANTICOAGULANT (COUMADIN) 2.5 MG tablet Take 5 mg by mouth daily Sun, Wed, Fri          Social History     Tobacco Use     Smoking status: Never Smoker     Smokeless tobacco: Never Used   Vaping Use     Vaping Use: Never used   Substance Use Topics     Alcohol use: No     Comment: Stopped drinking alcohol ~2009     Drug use: No       PVR 60ml    .    Ursula Doe,  ROSALES  12/7/2021  2:50 PM

## 2021-12-07 NOTE — PROGRESS NOTES
Anticoagulation Management    Unable to reach Nicko today.    Today's INR result of 1.7 is subtherapeutic (goal INR of 2.5-3.5).  Result received from: Clinic Lab    Follow up required to confirm warfarin dose taken and assess for changes and discuss out of range INR     Left message with wife to have patient take a booster dose of warfarin, 10 mg tonight and continue Lovenox 100 mg BID. New rx sent to pharmacy as it is unclear if patient has any syringes remaining. Wife will ensure patient f/u with [facility] team tomorrow.     Routing encounter to Dr. Morgan - do you want patient to continue Lovenox 100 mg BID until INR >2.5 for ONE or TWO INR checks? Please advise, thanks!      Anticoagulation clinic to follow up    Sienna Radford RN

## 2021-12-07 NOTE — PATIENT INSTRUCTIONS
Flu, Covid booster, and tetanus today.     Walk into pharmacy and request Shingrix #2.    Next year:  Prevnar 13.      Age 67: Pneumovax 23.     Colonoscopy in 2023.    Follow up with urology regarding your prostate and rechecking your prostate specific antigen (PSA)>     Stop your cytomel (AKA liothyronine)>    Follow up with me in 3 months and we'll recheck your thryoid.    It doesn't appear that you should be on lamotrigine either.  We are not filling this.    I'll let you know if we can stop your lovenox, based on your INR today.  (Goal 2.5 to 3.5)    Herve Morgan MD  Internal Medicine and Pediatrics

## 2021-12-07 NOTE — PROGRESS NOTES
Chief Complaint   Patient presents with     Overactive Bladder     Patient here today for Trial of Void and Bladder Scan       There were no vitals taken for this visit. There is no height or weight on file to calculate BMI.    Patient Active Problem List   Diagnosis     PAF (paroxysmal atrial fibrillation) (H)     Hypothyroidism     Mechanical AV Replacement 1992 -- on Warfarin      HYPERLIPIDEMIA LDL GOAL <130     BPH (benign prostatic hypertrophy)     Chronic low back pain     Tourette syndrome     Obesity     Lumbar radiculopathy     Hx of Thoracic Dissection repair -- 1992     Memory difficulties     OAB (overactive bladder)     Bilateral thoracic back pain     Obesity, BMI >35 with comorbidities     Alcohol dependence in remission (H)     Elevated prostate specific antigen (PSA)     Suicide attempt by multiple drug overdose, initial encounter (H)     Depression, unspecified depression type     Leg wound, left     Polypharmacy     Renal insufficiency     Hypotension, unspecified hypotension type     Anemia, unspecified type     Radiculitis     Rotator cuff tear     S/P lumbar fusion     Spinal stenosis of lumbar region     Hx of CVA x 4, 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 270 tablet 0     diazepam (VALIUM) 5 MG tablet Take 1 tablet (5 mg) by mouth nightly as needed for anxiety 30 tablet 0     donepezil (ARICEPT) 10 MG tablet Take 1 tablet (10 mg) by mouth At Bedtime 90  tablet 0     enoxaparin ANTICOAGULANT (LOVENOX) 100 MG/ML syringe Inject 1 mL (100 mg) Subcutaneous every 12 hours for 5 days 10 mL 0     enoxaparin ANTICOAGULANT (LOVENOX) 150 MG/ML syringe Stop Coumadin 5 days prior procedure. Begin Enoxaparin 4 days prior procedure. Inject 1 mL (150 mg) under skin every 12 hours. Hold the morning of procedure, but resume it that night. Continue for 4 days after procedure. 16 mL 0     folic acid (FOLVITE) 1 MG tablet Take 5 tablets  by mouth daily 450 tablet 3     guanFACINE (TENEX) 1 MG tablet Take 1 tablet (1 mg) by mouth At Bedtime 90 tablet 0     lamoTRIgine (LAMICTAL) 25 MG tablet Take 25 mg by mouth 2 times daily        levothyroxine (SYNTHROID/LEVOTHROID) 150 MCG tablet Take 1 tablet (150 mcg) by mouth daily 90 tablet 3     liothyronine (CYTOMEL) 25 MCG tablet Take 1 tablet by mouth daily 90 tablet 3     mirabegron (MYRBETRIQ) 50 MG 24 hr tablet Take 50 mg by mouth every evening       pregabalin (LYRICA) 100 MG capsule Take 1 capsule (100 mg) by mouth 3 times daily Do not take if sleepy/sedated. 270 capsule 3     propafenone (RYTHMOL) 150 MG TABS tablet Take 1 tablet (150 mg) by mouth every 8 hours 270 tablet 1     venlafaxine (EFFEXOR-XR) 75 MG 24 hr capsule Take 3 capsules (225 mg) by mouth daily 270 capsule 1     warfarin ANTICOAGULANT (COUMADIN) 2.5 MG tablet Take 7.5 mg (3 tabs) every Sun, Tue, Fri; 5 mg (2 tabs) all other days or as directed by the Anticoagulation Clinic 200 tablet 0     warfarin ANTICOAGULANT (COUMADIN) 2.5 MG tablet Take 5 mg by mouth daily Sun, Wed, Fri          Social History     Tobacco Use     Smoking status: Never Smoker     Smokeless tobacco: Never Used   Vaping Use     Vaping Use: Never used   Substance Use Topics     Alcohol use: No     Comment: Stopped drinking alcohol ~2009     Drug use: No     Comes into clinic today at the request of Dr Andersen provider found for TOV / catheter removal.    Catheter removal documentation on  12/7/2021:    Todd S Aschoff presents to the clinic for catheter removal.  Reason for removal: scheduled removal   Order has been verified. Yes  Catheter successfully removed at 10:13 AM without immediate complication.  100 cc's of urine present in the catheter bag.  Urethral meatus is free of secretions and encrustation.  The patient is afebrile.  The patient tolerated the procedure and was instructed to return or call for pain, fever, leakage or decreased urine flow, watch for signs of infection and call with any questions.    presented today for a trial of void.  Approximately 250 mL of normal saline instilled into bladder via catheter.  Patient stated he had urge to urinate and catheter was removed without difficulty.  Patient was given a cylinder to measure urine output.  Patient voided approximately 190 mL of clear urine.  PVR 60ml mL.    Cipro 500 given per protocol: Yes    Patient did tolerate procedure well.    No teaching was done with patient, The patient was  verbally told as where to call or go if pain, fever, or unable to urinate post catheter removal.    This service provided today was under the direct supervision of Dr Yee, who was available if needed.      PVR 60ML        ROSALES Tellez  12/7/2021  10:13 AM

## 2021-12-07 NOTE — PROGRESS NOTES
"SUBJECTIVE:   Todd S Aschoff is a 65 year old male who presents for Preventive Visit.      Patient has been advised of split billing requirements and indicates understanding: Yes   Are you in the first 12 months of your Medicare coverage?  No    Healthy Habits:     In general, how would you rate your overall health?  Fair    Frequency of exercise:  4-5 days/week    Duration of exercise:  15-30 minutes    Do you usually eat at least 4 servings of fruit and vegetables a day, include whole grains    & fiber and avoid regularly eating high fat or \"junk\" foods?  Yes    Taking medications regularly:  Yes    Medication side effects:  None    Ability to successfully perform activities of daily living:  No assistance needed    Home Safety:  No safety concerns identified    Hearing Impairment:  No hearing concerns    In the past 6 months, have you been bothered by leaking of urine?  No    In general, how would you rate your overall mental or emotional health?  Excellent      PHQ-2 Total Score: 0    Additional concerns today:  No    Do you feel safe in your environment? Yes    Have you ever done Advance Care Planning? (For example, a Health Directive, POLST, or a discussion with a medical provider or your loved ones about your wishes): Yes, advance care planning is on file.    Blood in urine has resolved.       Fall risk  Fallen 2 or more times in the past year?: Yes  Any fall with injury in the past year?: No    Cognitive Screening   1) Repeat 3 items (Leader, Season, Table)    2) Clock draw: NORMAL  3) 3 item recall: Recalls NO objects   Results: 0 items recalled: PROBABLE COGNITIVE IMPAIRMENT, **INFORM PROVIDER**    Mini-CogTM Copyright RL Holbrook. Licensed by the author for use in [facility]; reprinted with permission (nakita@.[facility]). All rights reserved.      Do you have sleep apnea, excessive snoring or daytime drowsiness?: no    Reviewed and updated as needed this visit by clinical staff  Tobacco  Allergies  "   Med Hx  Surg Hx  Fam Hx  Soc Hx       Reviewed and updated as needed this visit by Provider               Social History     Tobacco Use     Smoking status: Never Smoker     Smokeless tobacco: Never Used   Substance Use Topics     Alcohol use: No     Comment: Stopped drinking alcohol ~2009         Alcohol Use 12/7/2021   Prescreen: >3 drinks/day or >7 drinks/week? No   Prescreen: >3 drinks/day or >7 drinks/week? -           Depression and Anxiety Follow-Up    How are you doing with your depression since your last visit? Improved     How are you doing with your anxiety since your last visit?  Improved     Are you having other symptoms that might be associated with depression or anxiety? No    Have you had a significant life event? OTHER: daughter's  left her without saying anything     Do you have any concerns with your use of alcohol or other drugs? No    Social History     Tobacco Use     Smoking status: Never Smoker     Smokeless tobacco: Never Used   Vaping Use     Vaping Use: Never used   Substance Use Topics     Alcohol use: No     Comment: Stopped drinking alcohol ~2009     Drug use: No     PHQ 5/29/2020 12/11/2020 8/16/2021   PHQ-9 Total Score 0 7 0   Q9: Thoughts of better off dead/self-harm past 2 weeks Not at all Several days Not at all     RUTH-7 SCORE 8/2/2018 10/29/2019 8/16/2021   Total Score 3 0 2     Last PHQ-9 8/16/2021   1.  Little interest or pleasure in doing things 0   2.  Feeling down, depressed, or hopeless 0   3.  Trouble falling or staying asleep, or sleeping too much 0   4.  Feeling tired or having little energy 0   5.  Poor appetite or overeating 0   6.  Feeling bad about yourself 0   7.  Trouble concentrating 0   8.  Moving slowly or restless 0   Q9: Thoughts of better off dead/self-harm past 2 weeks 0   PHQ-9 Total Score 0   Difficulty at work, home, or with people Not difficult at all     RUTH-7  8/16/2021   1. Feeling nervous, anxious, or on edge 1   2. Not being able to stop  or control worrying 0   3. Worrying too much about different things 0   4. Trouble relaxing 0   5. Being so restless that it is hard to sit still 0   6. Becoming easily annoyed or irritable 1   7. Feeling afraid, as if something awful might happen 0   RUTH-7 Total Score 2   If you checked any problems, how difficult have they made it for you to do your work, take care of things at home, or get along with other people? Somewhat difficult       Suicide Assessment Five-step Evaluation and Treatment (SAFE-T)      Current providers sharing in care for this patient include:   Patient Care Team:  Herve Morgan MD as PCP - General (Internal Medicine - Pediatrics)  Obdulio Ingram MD as Assigned PCP  Jamal Andersen MD as Assigned Surgical Provider  Vanessa Davenport, RN as Lead Care Coordinator    The following health maintenance items are reviewed in Epic and correct as of today:  Health Maintenance Due   Topic Date Due     ANNUAL REVIEW OF HM ORDERS  Never done     MIGRAINE ACTION PLAN  Never done     HIV SCREENING  Never done     ZOSTER IMMUNIZATION (2 of 2) 12/17/2018     INFLUENZA VACCINE (1) 09/01/2021     COVID-19 Vaccine (3 - Booster for Moderna series) 11/23/2021     Lab work is in process  Labs reviewed in EPIC  BP Readings from Last 3 Encounters:   12/07/21 110/70   12/01/21 138/82   09/22/21 (!) 158/87    Wt Readings from Last 3 Encounters:   12/07/21 139.1 kg (306 lb 9.6 oz)   09/29/21 144.2 kg (318 lb)   09/22/21 139.7 kg (308 lb)                  Patient Active Problem List   Diagnosis     PAF (paroxysmal atrial fibrillation) (H)     Hypothyroidism     Mechanical AV Replacement 1992 -- on Warfarin      HYPERLIPIDEMIA LDL GOAL <130     BPH (benign prostatic hypertrophy)     Chronic low back pain     Tourette syndrome     Obesity     Lumbar radiculopathy     Hx of Thoracic Dissection repair -- 1992     Memory difficulties     OAB (overactive bladder)     Bilateral thoracic back pain     Obesity, BMI >35 with  comorbidities     Alcohol dependence in remission (H)     Elevated prostate specific antigen (PSA)     Suicide attempt by multiple drug overdose, initial encounter (H)     Depression, unspecified depression type     Leg wound, left     Polypharmacy     Renal insufficiency     Hypotension, unspecified hypotension type     Anemia, unspecified type     Radiculitis     Rotator cuff tear     S/P lumbar fusion     Spinal stenosis of lumbar region     Hx of CVA x 4, 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     Left Asad-Paresthesias     Spells of decreased attentiveness     Family hx of prostate cancer     Past Surgical History:   Procedure Laterality Date     AORTIC VALVE REPLACEMENT  1992    [facility] F[facility] valve     APPLY WOUND VAC Left 1/26/2021    Procedure: Placement of negative pressure wound therapy 2,400 squared centimeters  ;  Surgeon: Lazaro Plascencia MD;  Location:  OR     C TOTAL HIP ARTHROPLASTY  2010    Left AMANDA     C TOTAL HIP ARTHROPLASTY  2002    R hip replacement comp's by nerve injury with pain down into R leg, nadya below knee     CATARACT IOL, RT/LT      rt eye only     CHOLECYSTECTOMY, LAPOROSCOPIC  8/10     CLOSE SECONDARY WOUND LOWER EXTREMITY Left 2/3/2021    Procedure:  Split Thickness Skin Graft to Leg of 18 square centimeters  Intermediate Closure of Leg of 8cm   ;  Surgeon: Lazaro Plascencia MD;  Location:  OR     COLONOSCOPY N/A 1/15/2015    Procedure: COLONOSCOPY;  Surgeon: Johnson Kothari MD;  Location:  GI     DECOMPRESSION LUMBAR ONE LEVEL  4/3/2013    Procedure: DECOMPRESSION LUMBAR ONE LEVEL;  Open Decompression L3-4 bilateral;  Surgeon: Travis Coffey MD;  Location:  OR     DECOMPRESSION, FUSION CERVICAL ANTERIOR ONE LEVEL, COMBINED  3/23/2012    Procedure:COMBINED DECOMPRESSION, FUSION CERVICAL ANTERIOR ONE LEVEL; Anterior Cervical Decompression and Fusion C4-6; Surgeon:TRAVIS COFFEY; Location: OR     ELBOW SURGERY       EXPLORE SPINE, REMOVE HARDWARE, COMBINED  5/23/2013    Procedure:  COMBINED EXPLORE SPINE, REMOVE HARDWARE;  Exploration Lumbar Wound for fluid collection;  Surgeon: Khalif Coffey MD;  Location: [facility] OR     FUSION CERVICAL ANTERIOR TWO LEVELS  3/26/2012    Procedure:FUSION CERVICAL ANTERIOR TWO LEVELS; Anterior Cervical Fusion C4-6, Anterior Cervical  Hematoma Evacuation; Surgeon:KHALIF COFFEY; Location:[facility] OR     IR LUMBAR EPIDURAL STEROID INJECTION  3/28/2003     IRRIGATION AND DEBRIDEMENT LOWER EXTREMITY, COMBINED Left 1/10/2021    Procedure: 1.  Irrigation and excisional debridement to muscle left distal medial calf chronic wounds total area measuring 9 cm x 4 cm 2.  Irrigation and excisional debridement to fascia left medial calf chronic hematoma measuring 29 x 6.7 x 4.2 cm 3.  Primary complex wound closure left medial distal calf 9 cm in length;  Surgeon: Rod Kemp MD;  Location: [facility] OR     IRRIGATION AND DEBRIDEMENT LOWER EXTREMITY, COMBINED Left 1/26/2021    Procedure: Evacuation of hematoma debridement of skin, subcutaneous tissue and muscle 2,400 squared centimeters, placement of negative pressure wound therapy 2,400 squared centimeters;  Surgeon: Lazaro Plascencia MD;  Location: [facility] OR     IRRIGATION AND DEBRIDEMENT SPINE, CLOSE WOUND, COMBINED  3/26/2012    Procedure:COMBINED IRRIGATION AND DEBRIDEMENT SPINE, CLOSE WOUND; Surgeon:KHALIF COFFEY; Location:[facility] OR     OTHER SURGICAL HISTORY      AK UNLISTED ORBIT     OTHER SURGICAL HISTORY      AK UNLISTED SPINE     OTHER SURGICAL HISTORY      AK UNLISTED NECK/THORAX     OTHER SURGICAL HISTORY      (IA) AK TOTAL HIP ARTHROPLASTY     OTHER SURGICAL HISTORY      (IA) AK NEE SCOPE MED W LAT MENISCECT WWO DEBRIBE/SHAVE ANY CO     removal of cyst of back   2.5week     TONSILLECTOMY       wisdom teeth[       [facility] NONSPECIFIC PROCEDURE  1992    repair of TAA with graft       Social History     Tobacco Use     Smoking status: Never Smoker     Smokeless tobacco: Never Used   Substance Use Topics     Alcohol use:  No     Comment: Stopped drinking alcohol ~     Family History   Problem Relation Age of Onset     Cerebrovascular Disease Father          age 86, had M.I. ,diabetic also     Prostate Cancer Father      Cancer Mother          age 83 of dementia, also h/o lymphoma     Hypertension Brother         2 brothers with hypertension & sleep apnea     Hypertension Sister         Born ~1936     Sleep Apnea Brother          age 78, Alzheimers     No Known Problems Son      No Known Problems Daughter      No Known Problems Son      Family History Negative Brother         Had 5 brothers, three still alive as of      Colon Cancer No family hx of      Diabetes Mother      Diabetes Father          Current Outpatient Medications   Medication Sig Dispense Refill     aspirin (ASA) 81 MG chewable tablet Take 81 mg by mouth daily        cetirizine (ZYRTEC) 10 MG tablet Take 10 mg by mouth every morning        ciprofloxacin (CIPRO) 500 MG tablet Take 1 tablet (500 mg) by mouth once for 1 dose 2 tablet 0     cyclobenzaprine (FLEXERIL) 10 MG tablet TAKE ONE TABLET BY MOUTH THREE TIMES DAILY AS NEEDED FOR MUSCLE SPASM. 270 tablet 0     diazepam (VALIUM) 5 MG tablet Take 1 tablet (5 mg) by mouth nightly as needed for anxiety 30 tablet 0     donepezil (ARICEPT) 10 MG tablet Take 1 tablet (10 mg) by mouth At Bedtime 90 tablet 0     enoxaparin ANTICOAGULANT (LOVENOX) 100 MG/ML syringe Inject 1 mL (100 mg) Subcutaneous every 12 hours for 5 days 10 mL 0     folic acid (FOLVITE) 1 MG tablet Take 5 tablets  by mouth daily 450 tablet 3     guanFACINE (TENEX) 1 MG tablet Take 1 tablet (1 mg) by mouth At Bedtime 90 tablet 0     levothyroxine (SYNTHROID/LEVOTHROID) 150 MCG tablet Take 1 tablet (150 mcg) by mouth daily 90 tablet 3     mirabegron (MYRBETRIQ) 50 MG 24 hr tablet Take 50 mg by mouth every evening       pregabalin (LYRICA) 100 MG capsule Take 1 capsule (100 mg) by mouth 3 times daily Do not take if sleepy/sedated. 270  "capsule 3     propafenone (RYTHMOL) 150 MG TABS tablet Take 1 tablet (150 mg) by mouth every 8 hours 270 tablet 1     venlafaxine (EFFEXOR-XR) 75 MG 24 hr capsule Take 3 capsules (225 mg) by mouth daily 270 capsule 1     warfarin ANTICOAGULANT (COUMADIN) 2.5 MG tablet Take 7.5 mg (3 tabs) every Sun, Tue, Fri; 5 mg (2 tabs) all other days or as directed by the Anticoagulation Clinic 200 tablet 0     warfarin ANTICOAGULANT (COUMADIN) 2.5 MG tablet Take 5 mg by mouth daily Sun, Wed, Fri        acetaminophen (TYLENOL) 500 MG tablet Take 500-1,000 mg by mouth 2 times daily        Allergies   Allergen Reactions     Gabapentin      Severe behavioral disturbances             Review of Systems   Constitutional: Negative for chills and fever.   HENT: Positive for hearing loss. Negative for congestion, ear pain and sore throat.    Eyes: Negative for pain and visual disturbance.   Respiratory: Negative for cough and shortness of breath.    Cardiovascular: Negative for chest pain, palpitations and peripheral edema.   Gastrointestinal: Negative for abdominal pain, constipation, diarrhea, heartburn, hematochezia and nausea.   Genitourinary: Positive for impotence and penile discharge. Negative for dysuria, frequency, genital sores, hematuria and urgency.   Musculoskeletal: Positive for arthralgias and myalgias. Negative for joint swelling.   Skin: Negative for rash.   Neurological: Negative for dizziness, weakness, headaches and paresthesias.   Psychiatric/Behavioral: Negative for mood changes. The patient is nervous/anxious.          OBJECTIVE:   /70 (BP Location: Right arm, Patient Position: Sitting, Cuff Size: Adult Large)   Pulse 84   Temp 98.6  F (37  C) (Tympanic)   Resp 20   Ht 1.873 m (6' 1.75\")   Wt 139.1 kg (306 lb 9.6 oz)   SpO2 97%   BMI 39.63 kg/m   Estimated body mass index is 39.63 kg/m  as calculated from the following:    Height as of this encounter: 1.873 m (6' 1.75\").    Weight as of this encounter: " 139.1 kg (306 lb 9.6 oz).  Physical Exam  GENERAL: healthy, alert and no distress  EYES: Eyes grossly normal to inspection, PERRL and conjunctivae and sclerae normal  HENT: ear canals and TM's normal, nose and mouth without ulcers or lesions  NECK: no adenopathy, no asymmetry, masses, or scars and thyroid normal to palpation  RESP: lungs clear to auscultation - no rales, rhonchi or wheezes  CV: regular rate and rhythm, normal S1 S2, no S3 or S4, no murmur, click or rub, no peripheral edema and peripheral pulses strong  ABDOMEN: soft, nontender, no hepatosplenomegaly, no masses and bowel sounds normal  MS: no gross musculoskeletal defects noted, no edema  SKIN: no suspicious lesions or rashes  NEURO: Normal strength and tone, mentation intact and speech normal  PSYCH: mentation appears normal, affect normal/bright    Diagnostic Test Results:  Labs reviewed in Epic    ASSESSMENT / PLAN:   (Z00.01) Encounter for routine adult medical exam with abnormal findings  (primary encounter diagnosis)  Comment:   Plan: COVID-19,PF,MODERNA (18+ YRS BOOSTER .25ML),         Comprehensive metabolic panel (BMP + Alb, Alk         Phos, ALT, AST, Total. Bili, TP)        Discussed diet, exercise, testicular self exam, blood pressure, cholesterol, and need for cancer surveillance at appropriate ages.     (Z80.42) Family hx of prostate cancer  Comment:   Plan: follows with urology; will hold off on checking for now, due to recent surgery.      (E03.9) Acquired hypothyroidism  Comment:   Plan: TSH with free T4 reflex        Recheck today.     (I95.9) Hypotension, unspecified hypotension type  Comment:   Plan:     (Z95.2) Mechanical AV Replacement 1992 -- on Warfarin   Comment:   Plan: INR, CBC with platelets and differential        INR goal 2.5 to 3.5.  Recheck today.  Still on lovenox but may be able to stop this.     (R41.3) Memory difficulties  Comment:   Plan:     (F95.2) Tourette syndrome  Comment:   Plan: on guanfacine.     (D64.9)  "Anemia, unspecified type  Comment:   Plan: complete blood count today, while on lovenox and warfarin and aspirin.     (Z23) Need for prophylactic vaccination and inoculation against influenza  Comment:   Plan: INFLUENZA QUAD, RECOMBINANT, P-FREE (RIV4)         (FLUBLOK)              Patient has been advised of split billing requirements and indicates understanding: Yes  COUNSELING:  Reviewed preventive health counseling, as reflected in patient instructions       Regular exercise       Healthy diet/nutrition       Vision screening       Hearing screening       Alcohol Use        Colon cancer screening       Prostate cancer screening    Estimated body mass index is 39.63 kg/m  as calculated from the following:    Height as of this encounter: 1.873 m (6' 1.75\").    Weight as of this encounter: 139.1 kg (306 lb 9.6 oz).    Weight management plan: Patient was referred to their PCP to discuss a diet and exercise plan.    He reports that he has never smoked. He has never used smokeless tobacco.      Appropriate preventive services were discussed with this patient, including applicable screening as appropriate for cardiovascular disease, diabetes, osteopenia/osteoporosis, and glaucoma.  As appropriate for age/gender, discussed screening for colorectal cancer, prostate cancer, breast cancer, and cervical cancer. Checklist reviewing preventive services available has been given to the patient.    Reviewed patients plan of care and provided an AVS. The Complex Care Plan (for patients with higher acuity and needing more deliberate coordination of services) for Nicko meets the Care Plan requirement. This Care Plan has been established and reviewed with the Patient.    Counseling Resources:  ATP IV Guidelines  Pooled Cohorts Equation Calculator  Breast Cancer Risk Calculator  Breast Cancer: Medication to Reduce Risk  FRAX Risk Assessment  ICSI Preventive Guidelines  Dietary Guidelines for Americans, 2010  USDA's MyPlate  ASA " Prophylaxis  Lung CA Screening    [facility]erve Morgan MD  [facility]    Identified Health Risks:

## 2021-12-08 ENCOUNTER — TELEPHONE (OUTPATIENT)
Dept: PEDIATRICS | Facility: CLINIC | Age: 66
End: 2021-12-08
Payer: MEDICARE

## 2021-12-08 DIAGNOSIS — Z95.2 AORTIC VALVE PROSTHESIS PRESENT: Primary | ICD-10-CM

## 2021-12-08 DIAGNOSIS — I48.20 CHRONIC ATRIAL FIBRILLATION (H): ICD-10-CM

## 2021-12-08 RX ORDER — WARFARIN SODIUM 5 MG/1
TABLET ORAL
Qty: 95 TABLET | Refills: 0 | Status: ON HOLD | OUTPATIENT
Start: 2021-12-08 | End: 2022-01-18

## 2021-12-08 ASSESSMENT — ANXIETY QUESTIONNAIRES
2. NOT BEING ABLE TO STOP OR CONTROL WORRYING: NEARLY EVERY DAY
7. FEELING AFRAID AS IF SOMETHING AWFUL MIGHT HAPPEN: NOT AT ALL
1. FEELING NERVOUS, ANXIOUS, OR ON EDGE: NOT AT ALL
6. BECOMING EASILY ANNOYED OR IRRITABLE: SEVERAL DAYS
3. WORRYING TOO MUCH ABOUT DIFFERENT THINGS: NOT AT ALL
5. BEING SO RESTLESS THAT IT IS HARD TO SIT STILL: NOT AT ALL
IF YOU CHECKED OFF ANY PROBLEMS ON THIS QUESTIONNAIRE, HOW DIFFICULT HAVE THESE PROBLEMS MADE IT FOR YOU TO DO YOUR WORK, TAKE CARE OF THINGS AT HOME, OR GET ALONG WITH OTHER PEOPLE: NOT DIFFICULT AT ALL
GAD7 TOTAL SCORE: 4

## 2021-12-08 ASSESSMENT — PATIENT HEALTH QUESTIONNAIRE - PHQ9: 5. POOR APPETITE OR OVEREATING: NOT AT ALL

## 2021-12-08 NOTE — PROGRESS NOTES
Patient returned call to notify [facility] team that he was put on Ciprofloxacin 500 mg yesterday for a total of 2 doses. Catheter placed on 12/1 and removed on 12/7. This does not change ACRN's dosing plan but appreciates the f/u.      Patient requested to stop Lovenox tomorrow - writer offered to move up INR appt to verify level, but patient declined this. Discussed the importance of continuing Lovenox bridging until INR >2.5 especially given his recent strokes in June. Patient verbalized understanding and will administer Lovenox in the lateral thigh if necessary d/t abdominal bruising s/e. Sienna Radford, KALIN, RN

## 2021-12-08 NOTE — PROGRESS NOTES
ANTICOAGULATION MANAGEMENT     Todd S Aschoff 65 year old male is on warfarin with subtherapeutic INR result. (Goal INR 2.5-3.5)    Recent labs: (last 7 days)     12/07/21  1717   INR 1.7*       ASSESSMENT     Source(s): Chart Review and Patient/Caregiver Call       Warfarin doses taken: Warfarin recently held for 5 days which may be affecting INR - patient did receive yesterday's instructions to take boost dose of 10 mg. Patient also has about 7 Lovenox syringes left.    Diet: No new diet changes identified    New illness, injury, or hospitalization: Yes: 12/1 Rezum procedure on prostate    Medication/supplement changes: None noted    Signs or symptoms of bleeding or clotting: Yes: bruising at abdomen d/t Lovenox injections    Previous INR: Therapeutic last 2(+) visits    Additional findings: Bridging with Enoxaparin until INR > 2.5 x 1 INR check (per PCP approval) and Refill needed today - requested 5 mg tablets (to use in conjunction with 2.5 mg tablets)     PLAN     Recommended plan for temporary change(s) affecting INR     Dosing Instructions: Booster dose then continue your current warfarin dose with next INR in 3 days       Summary  As of 12/7/2021    Full warfarin instructions:  12/7: 10 mg; Otherwise 7.5 mg every Sun, Wed; 5 mg all other days   Next INR check:  12/10/2021             Telephone call with Nicko who verbalizes understanding and agrees to plan    Lab visit scheduled    Education provided: Please call back if any changes to your diet, medications or how you've been taking warfarin, Monitoring for bleeding signs and symptoms, Monitoring for clotting signs and symptoms and Importance of notifying clinic for changes in medications; a sooner lab recheck maybe needed.    Plan made per [facility] anticoagulation protocol    Sienna Radford, RN  Anticoagulation Clinic  12/8/2021    _______________________________________________________________________     Anticoagulation Episode Summary     Current INR  goal:  2.5-3.5   TTR:  59.1 % (10.7 mo)   Target end date:  Indefinite   Send INR reminders to:  [facility]    Indications    PAF (paroxysmal atrial fibrillation) (H) [I48.0]  Long term current use of anticoagulant therapy (Resolved) [Z79.01]  Mechanical AV Replacement 1992 -- on Warfarin  [Z95.2]           Comments:           Anticoagulation Care Providers     Provider Role Specialty Phone number    Obdulio Ingram MD Referring Internal Medicine [phone]

## 2021-12-08 NOTE — TELEPHONE ENCOUNTER
Attempted to return pt's call x 2 - phone line disconnected each time. Sienna Radford, KALIN, RN

## 2021-12-08 NOTE — TELEPHONE ENCOUNTER
Dr. Morgan-  He is hoping to get an Rx for the warfarin 5 mg. Jannet Ding RN on 12/8/2021 at 11:17 AM

## 2021-12-08 NOTE — PROGRESS NOTES
I would like him to continue lovenox until his INR is > 2.5 for one check.  Please have him come back in later this week for another INR check, and remain on 100 twice daily lovenox AND coumadin schedule until then.  May stop after INR > 2.5 once.    Herve Morgan MD  Internal Medicine and Pediatrics

## 2021-12-08 NOTE — TELEPHONE ENCOUNTER
Discussed refill request during today's [facility] encounter. Warfarin refill approved per [facility] protocol. Sienna Radford, KALIN, RN

## 2021-12-08 NOTE — TELEPHONE ENCOUNTER
Reason for call: Patient calling back and is requesting a call back from Anticoagulation team.     Phone number to reach patient:  Home number on file [phone] (home)    Best Time:  Any time     Can we leave a detailed message on this number?  YES

## 2021-12-09 LAB
ALBUMIN SERPL-MCNC: 3.3 G/DL (ref 3.4–5)
ALP SERPL-CCNC: 107 U/L (ref 40–150)
ALT SERPL W P-5'-P-CCNC: 26 U/L (ref 0–70)
ANION GAP SERPL CALCULATED.3IONS-SCNC: 5 MMOL/L (ref 3–14)
AST SERPL W P-5'-P-CCNC: 25 U/L (ref 0–45)
BILIRUB SERPL-MCNC: 1 MG/DL (ref 0.2–1.3)
BUN SERPL-MCNC: 14 MG/DL (ref 7–30)
CALCIUM SERPL-MCNC: 8.9 MG/DL (ref 8.5–10.1)
CHLORIDE BLD-SCNC: 105 MMOL/L (ref 94–109)
CO2 SERPL-SCNC: 30 MMOL/L (ref 20–32)
CREAT SERPL-MCNC: 1.06 MG/DL (ref 0.66–1.25)
GFR SERPL CREATININE-BSD FRML MDRD: 74 ML/MIN/1.73M2
GLUCOSE BLD-MCNC: 98 MG/DL (ref 70–99)
POTASSIUM BLD-SCNC: 4.6 MMOL/L (ref 3.4–5.3)
PROT SERPL-MCNC: 7.2 G/DL (ref 6.8–8.8)
SODIUM SERPL-SCNC: 140 MMOL/L (ref 133–144)
TSH SERPL DL<=0.005 MIU/L-ACNC: 2.71 MU/L (ref 0.4–4)

## 2021-12-09 ASSESSMENT — PATIENT HEALTH QUESTIONNAIRE - PHQ9: SUM OF ALL RESPONSES TO PHQ QUESTIONS 1-9: 0

## 2021-12-09 ASSESSMENT — ANXIETY QUESTIONNAIRES: GAD7 TOTAL SCORE: 4

## 2021-12-10 ENCOUNTER — LAB (OUTPATIENT)
Dept: LAB | Facility: CLINIC | Age: 66
End: 2021-12-10
Payer: COMMERCIAL

## 2021-12-10 DIAGNOSIS — Z79.01 LONG TERM CURRENT USE OF ANTICOAGULANT THERAPY: ICD-10-CM

## 2021-12-10 DIAGNOSIS — I48.91 ATRIAL FIBRILLATION (H): ICD-10-CM

## 2021-12-10 DIAGNOSIS — Z95.2 AORTIC VALVE PROSTHESIS PRESENT: ICD-10-CM

## 2021-12-10 PROCEDURE — 36416 COLLJ CAPILLARY BLOOD SPEC: CPT

## 2021-12-10 PROCEDURE — 85610 PROTHROMBIN TIME: CPT

## 2021-12-11 ENCOUNTER — NURSE TRIAGE (OUTPATIENT)
Dept: NURSING | Facility: CLINIC | Age: 66
End: 2021-12-11
Payer: MEDICARE

## 2021-12-11 DIAGNOSIS — Z95.2 AORTIC VALVE PROSTHESIS PRESENT: ICD-10-CM

## 2021-12-11 DIAGNOSIS — I48.0 PAF (PAROXYSMAL ATRIAL FIBRILLATION) (H): Primary | ICD-10-CM

## 2021-12-11 LAB — INR PPP: 3.4 (ref 0.85–1.15)

## 2021-12-11 NOTE — TELEPHONE ENCOUNTER
Had an INR yesterday 3.4    Range 2.5-3.5 Mechanical AV Replacement 1992     He has been taking 7.5 mon, tue, thurs, Fri, Sat    Wed, Sunday 10mg    Lovenox was stopped on Thursday    He was on Cipro 500mg for a total a of 2 doses this past week.    Was off for a medical    No signs of bleeding    Dr Lai Patterson, RN  [place] Nurse Advisor  11:23 AM  12/11/2021      Reason for Disposition    [1] Follow-up call from patient regarding patient's clinical status AND [2] information urgent    Protocols used: PCP CALL - NO TRIAGE-A-

## 2021-12-11 NOTE — TELEPHONE ENCOUNTER
Per Dr Hoyt - continue same dosing and recheck INR on Monday or Tuesday.    7.5mg on Saturday, Mon and 10mg on Sun    Soco Patterson RN  [place] Nurse Advisor  11:27 AM  12/11/2021

## 2021-12-11 NOTE — TELEPHONE ENCOUNTER
Patient called and told of provider advise.    Soco Patterson RN  [place] Nurse Advisor  11:37 AM  12/11/2021

## 2021-12-13 ENCOUNTER — ANTICOAGULATION THERAPY VISIT (OUTPATIENT)
Dept: ANTICOAGULATION | Facility: CLINIC | Age: 66
End: 2021-12-13

## 2021-12-13 ENCOUNTER — LAB (OUTPATIENT)
Dept: LAB | Facility: CLINIC | Age: 66
End: 2021-12-13
Payer: COMMERCIAL

## 2021-12-13 DIAGNOSIS — Z95.2 AORTIC VALVE PROSTHESIS PRESENT: Primary | ICD-10-CM

## 2021-12-13 DIAGNOSIS — Z95.2 AORTIC VALVE PROSTHESIS PRESENT: ICD-10-CM

## 2021-12-13 DIAGNOSIS — I48.0 PAF (PAROXYSMAL ATRIAL FIBRILLATION) (H): Primary | ICD-10-CM

## 2021-12-13 LAB — INR BLD: 1.9 (ref 0.9–1.1)

## 2021-12-13 PROCEDURE — 36416 COLLJ CAPILLARY BLOOD SPEC: CPT

## 2021-12-13 PROCEDURE — 85610 PROTHROMBIN TIME: CPT

## 2021-12-13 NOTE — PROGRESS NOTES
Attempted to reach patient by phone.  Left message to call INR Clinic. [phone].    Nova John RN  [facility]  If returning call, transfer to Nova [phone] or route to VINICIO GUERRERO [[id number]]

## 2021-12-13 NOTE — PROGRESS NOTES
"ANTICOAGULATION MANAGEMENT     Todd S Aschoff 65 year old male is on warfarin with subtherapeutic INR result. (Goal INR 2.5-3.5)    Recent labs: (last 7 days)     12/13/21  1048   INR 1.9*       ASSESSMENT     Source(s): Chart Review and Patient/Caregiver Call       Warfarin doses taken: Warfarin taken as instructed    Diet: No new diet changes identified    New illness, injury, or hospitalization: No    Medication/supplement changes: None noted    Signs or symptoms of bleeding or clotting: No    Previous INR: Therapeutic last visit; previously outside of goal range    Additional findings: Rezum procedure on prostate 12/1/21     PLAN     Recommended plan for no diet, medication or health factor changes affecting INR     Dosing Instructions: Take 7.5 mg today and tomorrow then continue your current warfarin dose with next INR in 4 days       Summary  As of 12/13/2021    Full warfarin instructions:  12/13: 7.5 mg; 12/14: 7.5 mg; Otherwise 7.5 mg every Sun, Wed; 5 mg all other days   Next INR check:  12/17/2021             Telephone call with Nicko who agrees to plan and repeated back plan correctly    Lab visit scheduled    Education provided: Contact [phone]  with any changes, questions or concerns.     Plan made with [facility] Pharmacist Jazmine Felipe  \"given the sensitivity to the 10 mg booster dose last week, let's have him do 7.5 mg today and tomorrow, then resume MD on Wed. recheck on Thurs or Friday this week.\"    Nova John RN  Anticoagulation Clinic  12/13/2021    _______________________________________________________________________     Anticoagulation Episode Summary     Current INR goal:  2.5-3.5   TTR:  59.4 % (10.7 mo)   Target end date:  Indefinite   Send INR reminders to:  AIDA GUERRERO    Indications    PAF (paroxysmal atrial fibrillation) (H) [I48.0]  Long term current use of anticoagulant therapy (Resolved) [Z79.01]  Mechanical AV Replacement 1992 -- on Warfarin  [Z95.2]           Comments:   "         Anticoagulation Care Providers     Provider Role Specialty Phone number    Obdulio Ingram MD Referring Internal Medicine [phone]

## 2021-12-13 NOTE — TELEPHONE ENCOUNTER
Received fax from [employer] [facility]    Result date: 12/10/2021  INR: 3.4    Per chart review, pt was dosed by on call provider over the weekend. Pt will likely need appointment today due to last week's rapid rise.

## 2021-12-17 ENCOUNTER — ANTICOAGULATION THERAPY VISIT (OUTPATIENT)
Dept: ANTICOAGULATION | Facility: CLINIC | Age: 66
End: 2021-12-17

## 2021-12-17 ENCOUNTER — LAB (OUTPATIENT)
Dept: LAB | Facility: CLINIC | Age: 66
End: 2021-12-17
Payer: COMMERCIAL

## 2021-12-17 DIAGNOSIS — Z95.2 AORTIC VALVE PROSTHESIS PRESENT: ICD-10-CM

## 2021-12-17 DIAGNOSIS — I48.0 PAF (PAROXYSMAL ATRIAL FIBRILLATION) (H): Primary | ICD-10-CM

## 2021-12-17 LAB — INR BLD: 3.4 (ref 0.9–1.1)

## 2021-12-17 PROCEDURE — 85610 PROTHROMBIN TIME: CPT

## 2021-12-17 PROCEDURE — 36416 COLLJ CAPILLARY BLOOD SPEC: CPT

## 2021-12-17 NOTE — PROGRESS NOTES
ANTICOAGULATION MANAGEMENT     Todd S Aschoff 65 year old male is on warfarin with therapeutic INR result. (Goal INR 2.5-3.5)    Recent labs: (last 7 days)     12/17/21  1353   INR 3.4*       ASSESSMENT     Source(s): Chart Review and Patient/Caregiver Call       Warfarin doses taken: Warfarin taken as instructed    Diet: No new diet changes identified    New illness, injury, or hospitalization: No    Medication/supplement changes: None noted    Signs or symptoms of bleeding or clotting: No    Previous INR: Subtherapeutic    Additional findings: None     PLAN     Recommended plan for ongoing change(s) affecting INR     Dosing Instructions:  Decrease your warfarin dose (6.3% change) with next INR in 10 days       Summary  As of 12/17/2021    Full warfarin instructions:  7.5 mg every Sun, Tue, Thu; 5 mg all other days   Next INR check:  12/27/2021             Telephone call with Nicko who agrees to plan and repeated back plan correctly    Lab visit scheduled    Education provided: Goal range and significance of current result and Contact [phone]  with any changes, questions or concerns.     Plan made per [facility] anticoagulation protocol    Nova John RN  Anticoagulation Clinic  12/17/2021    _______________________________________________________________________     Anticoagulation Episode Summary     Current INR goal:  2.5-3.5   TTR:  58.9 % (10.7 mo)   Target end date:  Indefinite   Send INR reminders to:  AIDA GUERRERO    Indications    PAF (paroxysmal atrial fibrillation) (H) [I48.0]  Long term current use of anticoagulant therapy (Resolved) [Z79.01]  Mechanical AV Replacement 1992 -- on Warfarin  [Z95.2]           Comments:           Anticoagulation Care Providers     Provider Role Specialty Phone number    Obdulio Ingram MD Referring Internal Medicine [phone]

## 2021-12-27 ENCOUNTER — LAB (OUTPATIENT)
Dept: LAB | Facility: CLINIC | Age: 66
End: 2021-12-27
Payer: COMMERCIAL

## 2021-12-27 ENCOUNTER — ANTICOAGULATION THERAPY VISIT (OUTPATIENT)
Dept: ANTICOAGULATION | Facility: CLINIC | Age: 66
End: 2021-12-27

## 2021-12-27 DIAGNOSIS — I48.0 PAF (PAROXYSMAL ATRIAL FIBRILLATION) (H): Primary | ICD-10-CM

## 2021-12-27 DIAGNOSIS — Z95.2 AORTIC VALVE PROSTHESIS PRESENT: ICD-10-CM

## 2021-12-27 LAB — INR BLD: 2.5 (ref 0.9–1.1)

## 2021-12-27 PROCEDURE — 85610 PROTHROMBIN TIME: CPT

## 2021-12-27 PROCEDURE — 36416 COLLJ CAPILLARY BLOOD SPEC: CPT

## 2021-12-27 NOTE — PROGRESS NOTES
Attempted to reach patient by phone. Went straight to [employer] x 2.  Left message with dosing for today (5mg) and to call INR Clinic. [phone].    Nova John RN  [facility]  If returning call, transfer to Nova [phone] or route to VINICIO GUERRERO [[id number]]

## 2021-12-27 NOTE — PROGRESS NOTES
Anticoagulation Management    Unable to reach Nicko by phone today.    Today's INR result of 2.5 is therapeutic (goal INR of 2.5-3.5).  Result received from: Clinic Lab    Follow up required to:   Confirm warfarin dose taken and assess for changes  Schedule next INR appointment in weeks    Left message to continue current dose of warfarin 5 mg tonight.      Anticoagulation clinic to follow up    [facility] ([facility]R) [facility]  [phone]

## 2021-12-28 NOTE — PROGRESS NOTES
Anticoagulation Management    Unable to reach Nicko and wife, Lissy today (x2).    Follow up required to confirm warfarin dose taken and assess for changes.  INR has been fluctuating recently, so ideally would prefer to talk to patient to discuss results.    Left message to continue current dose of warfarin 7.5 mg tonight.      Anticoagulation clinic to follow up    Amanda Wheeler RN

## 2021-12-29 ENCOUNTER — TELEPHONE (OUTPATIENT)
Dept: PEDIATRICS | Facility: CLINIC | Age: 66
End: 2021-12-29
Payer: MEDICARE

## 2021-12-29 NOTE — PROGRESS NOTES
ANTICOAGULATION MANAGEMENT     Todd S Aschoff 66 year old male is on warfarin with therapeutic INR result. (Goal INR 2.5-3.5)    Recent labs: (last 7 days)     12/27/21  1105   INR 2.5*       ASSESSMENT     Source(s): Chart Review and Patient/Caregiver Call       Warfarin doses taken: Warfarin taken as instructed    Diet: Decreased greens/vitamin K in diet; plans to resume previous intake.  Only had green veggies once this week.    New illness, injury, or hospitalization: No    Medication/supplement changes: None noted    Signs or symptoms of bleeding or clotting: No    Previous INR: Therapeutic last visit; previously outside of goal range.  INR has been sub therapeutic recently when boost doses were not given.  INR is at 2.5 today and patient ate very minimal green veggies, so maintenance dose was increased slightly.    Additional findings: None     PLAN     Recommended plan for temporary change(s) affecting INR     Dosing Instructions:  Increase your warfarin dose (6% change) with next INR in 1 week       Summary  As of 12/27/2021    Full warfarin instructions:  5 mg every Mon, Wed, Sat; 7.5 mg all other days   Next INR check:  1/6/2022             Telephone call with Nicko who agrees to plan and repeated back plan correctly    Lab visit scheduled    Education provided: Please call back if any changes to your diet, medications or how you've been taking warfarin and Impact of vitamin K foods on INR    Plan made per [facility] anticoagulation protocol    Amanda Wheeler RN  Anticoagulation Clinic  12/29/2021    _______________________________________________________________________     Anticoagulation Episode Summary     Current INR goal:  2.5-3.5   TTR:  61.4 % (10.7 mo)   Target end date:  Indefinite   Send INR reminders to:  AIDA GUERRERO    Indications    PAF (paroxysmal atrial fibrillation) (H) [I48.0]  Long term current use of anticoagulant therapy (Resolved) [Z79.01]  Mechanical AV Replacement 1992 -- on Warfarin   [Z95.2]           Comments:           Anticoagulation Care Providers     Provider Role Specialty Phone number    Obdulio Ingram MD Referring Internal Medicine [phone]

## 2021-12-29 NOTE — TELEPHONE ENCOUNTER
Reason for call:  INR   Who is calling? Patient    Phone number: [phone]    Fax number: N/A    Name of caller: Todd Aschoff    INR Value: 205    Are there any other concerns: Yes: Patient is returning call  Route this INR message to [facility]INIC[facility] # [id number]    Phone number to reach patient: [phone]  Telephone Information:   Mobile        Best Time:  Anytime    Can we leave a detailed message on this number?  YES    Travel screening: Not Applicable

## 2022-01-01 DIAGNOSIS — M62.830 BACK MUSCLE SPASM: ICD-10-CM

## 2022-01-01 RX ORDER — DIAZEPAM 5 MG
5 TABLET ORAL
Qty: 30 TABLET | Refills: 0 | Status: SHIPPED | OUTPATIENT
Start: 2022-01-01 | End: 2022-01-31

## 2022-01-03 ENCOUNTER — TRANSFERRED RECORDS (OUTPATIENT)
Dept: HEALTH INFORMATION MANAGEMENT | Facility: CLINIC | Age: 67
End: 2022-01-03
Payer: MEDICARE

## 2022-01-05 ENCOUNTER — PATIENT OUTREACH (OUTPATIENT)
Dept: CARE COORDINATION | Facility: CLINIC | Age: 67
End: 2022-01-05
Payer: MEDICARE

## 2022-01-05 NOTE — PROGRESS NOTES
Clinic Care Coordination Contact  [facility]/Voicemail    Clinical Data: Care Coordinator Outreach  Outreach attempted x 1.  Left message on patient's voicemail with call back information and requested return call.  Plan: Care Coordinator will try to reach patient again in 10 business days.    Vanessa Davenport, RN Care Coordinator  [facility] [place]  Email: [email or web address]  Phone: [phone]

## 2022-01-06 ENCOUNTER — LAB (OUTPATIENT)
Dept: LAB | Facility: CLINIC | Age: 67
End: 2022-01-06
Payer: COMMERCIAL

## 2022-01-06 ENCOUNTER — ANTICOAGULATION THERAPY VISIT (OUTPATIENT)
Dept: ANTICOAGULATION | Facility: CLINIC | Age: 67
End: 2022-01-06

## 2022-01-06 DIAGNOSIS — Z95.2 AORTIC VALVE PROSTHESIS PRESENT: ICD-10-CM

## 2022-01-06 DIAGNOSIS — I48.0 PAF (PAROXYSMAL ATRIAL FIBRILLATION) (H): Primary | ICD-10-CM

## 2022-01-06 LAB — INR BLD: 2.7 (ref 0.9–1.1)

## 2022-01-06 PROCEDURE — 36416 COLLJ CAPILLARY BLOOD SPEC: CPT

## 2022-01-06 PROCEDURE — 85610 PROTHROMBIN TIME: CPT

## 2022-01-06 NOTE — PROGRESS NOTES
Left message to number listed for cell phone to call [phone]. Mailbox full on home number listed.    Anticoagulation Management    Unable to reach Nicko today.    Today's INR result of 2.7 is therapeutic (goal INR of 2.5-3.5).  Result received from: Clinic Lab    Follow up required to confirm warfarin dose taken and assess for changes    Left message to continue current dose of warfarin 7.5 mg tonight.      Anticoagulation clinic to follow up    Lucina Andrew RN

## 2022-01-07 NOTE — PROGRESS NOTES
Called patient's number, voicemail full. Called spouse, left message to have patient return call.  Lucina Andrew RN, BSN  Anticoagulation Clinic

## 2022-01-07 NOTE — PROGRESS NOTES
ANTICOAGULATION MANAGEMENT     Todd S Aschoff 66 year old male is on warfarin with therapeutic INR result. (Goal INR 2.5-3.5)    Recent labs: (last 7 days)     01/06/22  1249   INR 2.7*       ASSESSMENT     Source(s): Chart Review and Patient/Caregiver Call       Warfarin doses taken: Warfarin taken as instructed    Diet: No new diet changes identified    New illness, injury, or hospitalization: Yes: Patient pretty sure he has COVID, wife, daughter and grandchildren have tested positive, patient states he felt like he was coming down with a cold 2 days ago    Medication/supplement changes: None noted    Signs or symptoms of bleeding or clotting: No    Previous INR: Therapeutic last 2 visits    Additional findings: None     PLAN     Recommended plan for no diet, medication or health factor changes affecting INR     Dosing Instructions: Continue your current warfarin dose with next INR in 2 weeks       Summary  As of 1/6/2022    Full warfarin instructions:  5 mg every Mon, Wed, Sat; 7.5 mg all other days   Next INR check:  1/19/2022             Telephone call with Nicko who verbalizes understanding and agrees to plan    Lab visit scheduled    Education provided: Please call back if any changes to your diet, medications or how you've been taking warfarin and Contact [phone]  with any changes, questions or concerns.     Plan made per [facility] anticoagulation protocol    Lucina Andrew RN  Anticoagulation Clinic  1/7/2022    _______________________________________________________________________     Anticoagulation Episode Summary     Current INR goal:  2.5-3.5   TTR:  61.9 % (10.7 mo)   Target end date:  Indefinite   Send INR reminders to:  AIDA GUERRERO    Indications    PAF (paroxysmal atrial fibrillation) (H) [I48.0]  Long term current use of anticoagulant therapy (Resolved) [Z79.01]  Mechanical AV Replacement 1992 -- on Warfarin  [Z95.2]           Comments:           Anticoagulation Care Providers     Provider Role  Specialty Phone number    Obdulio Ingram MD Referring Internal Medicine [phone]

## 2022-01-12 ENCOUNTER — OFFICE VISIT (OUTPATIENT)
Dept: UROLOGY | Facility: CLINIC | Age: 67
End: 2022-01-12
Payer: COMMERCIAL

## 2022-01-12 VITALS
BODY MASS INDEX: 39.27 KG/M2 | SYSTOLIC BLOOD PRESSURE: 140 MMHG | HEART RATE: 70 BPM | OXYGEN SATURATION: 99 % | WEIGHT: 306 LBS | HEIGHT: 74 IN | DIASTOLIC BLOOD PRESSURE: 80 MMHG

## 2022-01-12 DIAGNOSIS — N40.1 BPH WITH OBSTRUCTION/LOWER URINARY TRACT SYMPTOMS: ICD-10-CM

## 2022-01-12 DIAGNOSIS — N32.81 OAB (OVERACTIVE BLADDER): Primary | ICD-10-CM

## 2022-01-12 DIAGNOSIS — R97.20 ELEVATED PROSTATE SPECIFIC ANTIGEN (PSA): ICD-10-CM

## 2022-01-12 DIAGNOSIS — N13.8 BPH WITH OBSTRUCTION/LOWER URINARY TRACT SYMPTOMS: ICD-10-CM

## 2022-01-12 LAB — RESIDUAL VOLUME (RV) (EXTERNAL): 90

## 2022-01-12 PROCEDURE — 51798 US URINE CAPACITY MEASURE: CPT | Performed by: UROLOGY

## 2022-01-12 PROCEDURE — 99024 POSTOP FOLLOW-UP VISIT: CPT | Performed by: UROLOGY

## 2022-01-12 ASSESSMENT — PAIN SCALES - GENERAL: PAINLEVEL: SEVERE PAIN (7)

## 2022-01-12 ASSESSMENT — MIFFLIN-ST. JEOR: SCORE: 2242.76

## 2022-01-12 NOTE — PROGRESS NOTES
[facility]  CHIEF COMPLAINT   It was my pleasure to see Todd S Aschoff who is a 66 year old male for follow-up of Elevated PSA.      HPI   Todd S Aschoff is a very pleasant 66 year old male who presents with a history of Elevated PSA.  Patient had previously followed with Dr. Gonzales and was referred to Dr. Mock for discussion of possible InterStim or Botox for his overactive bladder symptoms.  However he was noted to have an elevated PSA and I discussed with him proceeding with a prostate biopsy.  He underwent a MR fusion biopsy on February 17 which was based on his prior MRI from 2017.  He returns today to discuss the results of his biopsy.  There is no evidence of prostate cancer.    6/23/21:  On mirabegron (Myrbetriq) for the past 2 years   He is on Warfarin secondary to a heart valve   Follow-up today he is very interested in a REZUM  He does not recall discussion of an InterStim    7/26/21:  Follow-up today for cystoscopy  He notes his most bothersome symptom is nocturia and proceed frequency  He does note that he has had 4 small strokes in the prior year    AUASS: 3-3-1-0-1-0-2/3 = 10/11  QOL = 5  PVR = 34cc    9/22/21:  Urodynamics (UDS) with Purnima Sutherland  POSTPROCEDURE DIAGNOSES:  -Maximum cystometric capacity ~240 mL with heightened filling sensations.  -Good bladder compliance with high pressure detrusor overactivity (Pdet reaches >100 cm H2O at the peak of these uninhibited detrusor contractions) with associated urgency incontinence starting at bladder volumes >150 mL. He leaks with the first episode of DO but is then able to suppress the incontinence and the bladder spasm subsides. During the second episode of DO, he is able to suppress incontinence but expresses imminent desire to void and is thus given permission to void.  -Strong detrusor contraction during voiding reaching 109 cm H2O.   -He voids 221 mL with slow flow (Qmax 12 ml/s) and mild incomplete bladder emptying (PVR 20 mL). On fluoroscopy, a  "small volume of contrast is retained within bilateral bladder diverticuli post-void.   -BOOI is 68.4 which is suggestive for bladder outlet obstruction.  -Fluoroscopy otherwise reveals a moderately trabeculated bladder wall without vesicoureteral reflux. The bladder neck initially appears closed during filling and appears open during episodes of incontinence and voiding.     9/29/21:  Follow up to discuss Urodynamics (UDS)  He remains very interested in the Rezum procedure    12/1/21:  REZUM    TODAY 1/12/22:  He is doing very well after the procedure  Stream is slightly weak  Flow is better   Frequency and urgency is much better  Very happy with the results    PVR = 90cc    PHYSICAL EXAM  Patient is a 66 year old  male   Vitals: Blood pressure (!) 140/80, pulse 70, height 1.88 m (6' 2\"), weight 138.8 kg (306 lb), SpO2 99 %.  Body mass index is 39.29 kg/m .  General Appearance Adult:   Alert, no acute distress, oriented  HENT: throat/mouth:normal, good dentition  Lungs: no respiratory distress, or pursed lip breathing  Heart: No obvious jugular venous distension present  Abdomen: soft, nontender, no organomegaly or masses  Musculoskeltal: extremities normal, no peripheral edema  Skin: no suspicious lesions or rashes  Neuro: Alert, oriented, speech and mentation normal  Psych: affect and mood normal  Gait: Normal       Component PSA PSA Diag Urologic Phys     Component PSA PSA Diag Urologic Phys   Latest Ref Rng & Units 0.00 - 4.00 ug/L 0.00 - 4.00 ng/mL   4/20/2004 0.56    5/12/2006 1.11    9/5/2007 1.82    10/29/2008 2.46    10/23/2009 1.44    12/27/2010 4.68 (H)    1/6/2012 4.13 (H)    10/31/2012 4.08 (H)    12/17/2013 3.70    12/24/2014 3.37    1/8/2016 4.57 (H)    1/25/2017  13.80 (H)   2/22/2017 5.84 (H)    11/1/2017  3.80   6/12/2018 3.84    10/29/2019 6.28 (H)    1/14/2020 6.93 (H)    7/26/2021 4.80 (H)      PATHOLOGY:  2/17/2020  A.-M. - no evidence of disease     IMAGING:  All pertinent imaging " reviewed:    All imaging studies reviewed by me.  I personally reviewed these imaging films.  A formal report from radiology will follow.  FINDINGS:  Prostate gland size: 35 x 51 x 50  Volume: 46.4 g     Peripheral zone: In the left posterior peripheral zone in the mid  gland (series 7 image 31), there is a 4 x 4 by 4 mm focus of T2  hypointensity with moderately decreased ADC signal, no significant  increased signal on high B value diffusion weighted imaging, and no  early enhancement (best visualized on series 9 image 15, series 6  image 19, series 5 image 25, series 3 image 13).     PI-RADS:  Peripheral zone T2: 3  Diffusion-weighted image: 3    Contrast-enhanced images: Negative       Overall assessment: 3     Transitional zone: There are BPH type changes without suspicious  lesion.     PI-RADS:  Transition zone T2: 2  Diffusion-weighted image: 2  Contrast-enhanced images: Negative       Overall assessment: 2     Remainder of the pelvis:  No lymphadenopathy. The visualized  vasculature is patent. Partially decompressed bladder, otherwise  unremarkable. Partially evaluated bilateral hip prosthesis,  surrounding metal artifact limits evaluation. No suspicious focal  lesions in the bones or the soft tissue. The visualized bowel is  unremarkable. Small bilateral fat-containing inguinal hernias.  Hydrocele.                                                     IMPRESSION:   1. Based on the most suspicious abnormality, this exam is  characterized as PIRADS 3 - Intermediate probability.  The presence of  clinically significant cancer is equivocal. The most suspicious focus  is in the left posterior lateral peripheral zone at the 5:00 position  measuring 4 mm. There is no evidence for extraprostatic extension.  2. No evidence of extraprostatic malignancy. No suspicious adenopathy  or evidence of pelvic metastases.    ASSESSMENT and PLAN  66-year-old man with history of elevated PSA and overactive bladder  symptoms    Elevated PSA  -He is now status post an MR fusion biopsy with no evidence of prostate cancer  -PSA previously stable and decreased to 4.80  -Follow-up in 1 year for PSA    LUTS and overactive bladder symptoms  -He is doing very well following his Rezum procedure and is very happy with the outcomes  -We will continue to monitor this going forward    Chart documentation with Dragon Voice recognition Software. Although reviewed after completion, some words and grammatical errors may remain.       Time spent: 15 minutes spent on the date of the encounter doing chart review, history and exam, documentation and further activities as noted above.      Jamal Andersen MD   Urology  [facility]  Clinic Phone [phone]

## 2022-01-12 NOTE — LETTER
1/12/2022       RE: Todd S Aschoff  [address]  [place] [address]     Dear Colleague,    Thank you for referring your patient, Todd S Aschoff, to the [facility] at [facility]. Please see a copy of my visit note below.    [place]  CHIEF COMPLAINT   It was my pleasure to see Todd S Aschoff who is a 66 year old male for follow-up of Elevated PSA.      HPI   Todd S Aschoff is a very pleasant 66 year old male who presents with a history of Elevated PSA.  Patient had previously followed with Dr. Gonzales and was referred to Dr. Mock for discussion of possible InterStim or Botox for his overactive bladder symptoms.  However he was noted to have an elevated PSA and I discussed with him proceeding with a prostate biopsy.  He underwent a MR fusion biopsy on February 17 which was based on his prior MRI from 2017.  He returns today to discuss the results of his biopsy.  There is no evidence of prostate cancer.    6/23/21:  On mirabegron (Myrbetriq) for the past 2 years   He is on Warfarin secondary to a heart valve   Follow-up today he is very interested in a REZUM  He does not recall discussion of an InterStim    7/26/21:  Follow-up today for cystoscopy  He notes his most bothersome symptom is nocturia and proceed frequency  He does note that he has had 4 small strokes in the prior year    AUASS: 3-3-1-0-1-0-2/3 = 10/11  QOL = 5  PVR = 34cc    9/22/21:  Urodynamics (UDS) with Purnima Sutherland  POSTPROCEDURE DIAGNOSES:  -Maximum cystometric capacity ~240 mL with heightened filling sensations.  -Good bladder compliance with high pressure detrusor overactivity (Pdet reaches >100 cm H2O at the peak of these uninhibited detrusor contractions) with associated urgency incontinence starting at bladder volumes >150 mL. He leaks with the first episode of DO but is then able to suppress the incontinence and the bladder spasm subsides. During the second  "episode of DO, he is able to suppress incontinence but expresses imminent desire to void and is thus given permission to void.  -Strong detrusor contraction during voiding reaching 109 cm H2O.   -He voids 221 mL with slow flow (Qmax 12 ml/s) and mild incomplete bladder emptying (PVR 20 mL). On fluoroscopy, a small volume of contrast is retained within bilateral bladder diverticuli post-void.   -BOOI is 68.4 which is suggestive for bladder outlet obstruction.  -Fluoroscopy otherwise reveals a moderately trabeculated bladder wall without vesicoureteral reflux. The bladder neck initially appears closed during filling and appears open during episodes of incontinence and voiding.     9/29/21:  Follow up to discuss Urodynamics (UDS)  He remains very interested in the Rezum procedure    12/1/21:  REZUM    TODAY 1/12/22:  He is doing very well after the procedure  Stream is slightly weak  Flow is better   Frequency and urgency is much better  Very happy with the results    PVR = 90cc    PHYSICAL EXAM  Patient is a 66 year old  male   Vitals: Blood pressure (!) 140/80, pulse 70, height 1.88 m (6' 2\"), weight 138.8 kg (306 lb), SpO2 99 %.  Body mass index is 39.29 kg/m .  General Appearance Adult:   Alert, no acute distress, oriented  HENT: throat/mouth:normal, good dentition  Lungs: no respiratory distress, or pursed lip breathing  Heart: No obvious jugular venous distension present  Abdomen: soft, nontender, no organomegaly or masses  Musculoskeltal: extremities normal, no peripheral edema  Skin: no suspicious lesions or rashes  Neuro: Alert, oriented, speech and mentation normal  Psych: affect and mood normal  Gait: Normal       Component PSA PSA Diag Urologic Phys     Component PSA PSA Diag Urologic Phys   Latest Ref Rng & Units 0.00 - 4.00 ug/L 0.00 - 4.00 ng/mL   4/20/2004 0.56    5/12/2006 1.11    9/5/2007 1.82    10/29/2008 2.46    10/23/2009 1.44    12/27/2010 4.68 (H)    1/6/2012 4.13 (H)    10/31/2012 4.08 (H)  "   12/17/2013 3.70    12/24/2014 3.37    1/8/2016 4.57 (H)    1/25/2017  13.80 (H)   2/22/2017 5.84 (H)    11/1/2017  3.80   6/12/2018 3.84    10/29/2019 6.28 (H)    1/14/2020 6.93 (H)    7/26/2021 4.80 (H)      PATHOLOGY:  2/17/2020  A.-M. - no evidence of disease     IMAGING:  All pertinent imaging reviewed:    All imaging studies reviewed by me.  I personally reviewed these imaging films.  A formal report from radiology will follow.  FINDINGS:  Prostate gland size: 35 x 51 x 50  Volume: 46.4 g     Peripheral zone: In the left posterior peripheral zone in the mid  gland (series 7 image 31), there is a 4 x 4 by 4 mm focus of T2  hypointensity with moderately decreased ADC signal, no significant  increased signal on high B value diffusion weighted imaging, and no  early enhancement (best visualized on series 9 image 15, series 6  image 19, series 5 image 25, series 3 image 13).     PI-RADS:  Peripheral zone T2: 3  Diffusion-weighted image: 3    Contrast-enhanced images: Negative       Overall assessment: 3     Transitional zone: There are BPH type changes without suspicious  lesion.     PI-RADS:  Transition zone T2: 2  Diffusion-weighted image: 2  Contrast-enhanced images: Negative       Overall assessment: 2     Remainder of the pelvis:  No lymphadenopathy. The visualized  vasculature is patent. Partially decompressed bladder, otherwise  unremarkable. Partially evaluated bilateral hip prosthesis,  surrounding metal artifact limits evaluation. No suspicious focal  lesions in the bones or the soft tissue. The visualized bowel is  unremarkable. Small bilateral fat-containing inguinal hernias.  Hydrocele.                                                     IMPRESSION:   1. Based on the most suspicious abnormality, this exam is  characterized as PIRADS 3 - Intermediate probability.  The presence of  clinically significant cancer is equivocal. The most suspicious focus  is in the left posterior lateral peripheral zone at  the 5:00 position  measuring 4 mm. There is no evidence for extraprostatic extension.  2. No evidence of extraprostatic malignancy. No suspicious adenopathy  or evidence of pelvic metastases.    ASSESSMENT and PLAN  66-year-old man with history of elevated PSA and overactive bladder symptoms    Elevated PSA  -He is now status post an MR fusion biopsy with no evidence of prostate cancer  -PSA previously stable and decreased to 4.80  -Follow-up in 1 year for PSA    LUTS and overactive bladder symptoms  -He is doing very well following his Rezum procedure and is very happy with the outcomes  -We will continue to monitor this going forward    Chart documentation with Dragon Voice recognition Software. Although reviewed after completion, some words and grammatical errors may remain.       Time spent: 15 minutes spent on the date of the encounter doing chart review, history and exam, documentation and further activities as noted above.      Jamal Andersen MD   Urology  [facility]  Clinic Phone [phone]

## 2022-01-12 NOTE — NURSING NOTE
Chief Complaint   Patient presents with     Enlarged prostate     Rezum results     Over Active bladder   PVR was 90 ml today.  Soco Tucker LPN

## 2022-01-13 NOTE — PROGRESS NOTES
Clinic Care Coordination Contact  [facility]/Voicemail    Clinical Data: Care Coordinator Outreach  Outreach attempted x 2.  Left message on patient's voicemail with call back information and requested return call.  Plan: Care Coordinator will send disenrollment letter with care coordinator contact information via eflow. Care Coordinator will do no further outreaches at this time.    Vanessa Davenport RN Care Coordinator  [facility] [place]  Email: [email or web address]  Phone: [phone]

## 2022-01-14 ENCOUNTER — TELEPHONE (OUTPATIENT)
Dept: PEDIATRICS | Facility: CLINIC | Age: 67
End: 2022-01-14
Payer: MEDICARE

## 2022-01-14 NOTE — TELEPHONE ENCOUNTER
Spoke with patient, informed there is no documentation that [facility] or family practice called him from what I see. Patient understands.     Susan Gamboa RN   [facility]  [place], [facility]

## 2022-01-14 NOTE — TELEPHONE ENCOUNTER
Reason for Call:  Other call back    Detailed comments: Patient called stating he missed a call from an INR nurse, no message in chart.    Phone Number Patient can be reached at: Home number on file [phone] (home)    Best Time: any    Can we leave a detailed message on this number? YES    Call taken on 1/14/2022 at 4:54 PM by Romina Mcgill

## 2022-01-18 ENCOUNTER — APPOINTMENT (OUTPATIENT)
Dept: CT IMAGING | Facility: CLINIC | Age: 67
End: 2022-01-18
Attending: EMERGENCY MEDICINE
Payer: COMMERCIAL

## 2022-01-18 ENCOUNTER — HOSPITAL ENCOUNTER (OUTPATIENT)
Facility: CLINIC | Age: 67
Setting detail: OBSERVATION
Discharge: HOME | End: 2022-01-19
Attending: INTERNAL MEDICINE | Admitting: HOSPITALIST
Payer: COMMERCIAL

## 2022-01-18 DIAGNOSIS — K59.03 DRUG-INDUCED CONSTIPATION: ICD-10-CM

## 2022-01-18 DIAGNOSIS — Z95.2 AORTIC VALVE PROSTHESIS PRESENT: Primary | ICD-10-CM

## 2022-01-18 DIAGNOSIS — S30.1XXA TRAUMATIC HEMATOMA OF FLANK, INITIAL ENCOUNTER: ICD-10-CM
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
## 2022-01-18 DIAGNOSIS — D62 ANEMIA DUE TO BLOOD LOSS, ACUTE: ICD-10-CM

## 2022-01-18 DIAGNOSIS — S12.9XXA CLOSED FRACTURE OF TRANSVERSE PROCESS OF CERVICAL VERTEBRA, INITIAL ENCOUNTER (H): ICD-10-CM

## 2022-01-18 DIAGNOSIS — S22.41XA CLOSED FRACTURE OF MULTIPLE RIBS OF RIGHT SIDE, INITIAL ENCOUNTER: ICD-10-CM

## 2022-01-18 LAB
ABO/RH(D): NORMAL
ALBUMIN SERPL-MCNC: 2.7 G/DL (ref 3.4–5)
ALBUMIN UR-MCNC: 30 MG/DL
ALP SERPL-CCNC: 89 U/L (ref 40–150)
ALT SERPL W P-5'-P-CCNC: 21 U/L (ref 0–70)
ANION GAP SERPL CALCULATED.3IONS-SCNC: 3 MMOL/L (ref 3–14)
ANTIBODY SCREEN: NEGATIVE
APPEARANCE UR: CLEAR
AST SERPL W P-5'-P-CCNC: 18 U/L (ref 0–45)
BASOPHILS # BLD AUTO: 0 10E3/UL (ref 0–0.2)
BASOPHILS NFR BLD AUTO: 0 %
BILIRUB SERPL-MCNC: 2.6 MG/DL (ref 0.2–1.3)
BILIRUB UR QL STRIP: NEGATIVE
BLD PROD TYP BPU: NORMAL
BLD PROD TYP BPU: NORMAL
BLOOD COMPONENT TYPE: NORMAL
BLOOD COMPONENT TYPE: NORMAL
BUN SERPL-MCNC: 19 MG/DL (ref 7–30)
CALCIUM SERPL-MCNC: 8.6 MG/DL (ref 8.5–10.1)
CHLORIDE BLD-SCNC: 105 MMOL/L (ref 94–109)
CO2 SERPL-SCNC: 31 MMOL/L (ref 20–32)
CODING SYSTEM: NORMAL
CODING SYSTEM: NORMAL
COLOR UR AUTO: ABNORMAL
CREAT BLD-MCNC: 0.8 MG/DL (ref 0.7–1.3)
CREAT SERPL-MCNC: 0.81 MG/DL (ref 0.66–1.25)
CROSSMATCH: NORMAL
CROSSMATCH: NORMAL
EOSINOPHIL # BLD AUTO: 0.1 10E3/UL (ref 0–0.7)
EOSINOPHIL NFR BLD AUTO: 2 %
ERYTHROCYTE [DISTWIDTH] IN BLOOD BY AUTOMATED COUNT: 13.8 % (ref 10–15)
GFR SERPL CREATININE-BSD FRML MDRD: >60 ML/MIN/1.73M2
GFR SERPL CREATININE-BSD FRML MDRD: >90 ML/MIN/1.73M2
GLUCOSE BLD-MCNC: 110 MG/DL (ref 70–99)
GLUCOSE BLDC GLUCOMTR-MCNC: 89 MG/DL (ref 70–99)
GLUCOSE UR STRIP-MCNC: NEGATIVE MG/DL
HCT VFR BLD AUTO: 31.8 % (ref 40–53)
HGB BLD-MCNC: 10.4 G/DL (ref 13.3–17.7)
HGB BLD-MCNC: 10.5 G/DL (ref 13.3–17.7)
HGB BLD-MCNC: 9.6 G/DL (ref 13.3–17.7)
HGB UR QL STRIP: NEGATIVE
HOLD SPECIMEN: NORMAL
IMM GRANULOCYTES # BLD: 0.1 10E3/UL
IMM GRANULOCYTES NFR BLD: 1 %
INR PPP: 3.82 (ref 0.85–1.15)
KETONES UR STRIP-MCNC: 15 MG/DL
LEUKOCYTE ESTERASE UR QL STRIP: NEGATIVE
LYMPHOCYTES # BLD AUTO: 1.2 10E3/UL (ref 0.8–5.3)
LYMPHOCYTES NFR BLD AUTO: 15 %
MCH RBC QN AUTO: 32 PG (ref 26.5–33)
MCHC RBC AUTO-ENTMCNC: 32.7 G/DL (ref 31.5–36.5)
MCV RBC AUTO: 98 FL (ref 78–100)
MONOCYTES # BLD AUTO: 0.9 10E3/UL (ref 0–1.3)
MONOCYTES NFR BLD AUTO: 11 %
MUCOUS THREADS #/AREA URNS LPF: PRESENT /LPF
NEUTROPHILS # BLD AUTO: 5.9 10E3/UL (ref 1.6–8.3)
NEUTROPHILS NFR BLD AUTO: 71 %
NITRATE UR QL: NEGATIVE
NRBC # BLD AUTO: 0 10E3/UL
NRBC BLD AUTO-RTO: 0 /100
PH UR STRIP: 6 [PH] (ref 5–7)
PLATELET # BLD AUTO: 274 10E3/UL (ref 150–450)
POTASSIUM BLD-SCNC: 3.8 MMOL/L (ref 3.4–5.3)
PROT SERPL-MCNC: 6.5 G/DL (ref 6.8–8.8)
RBC # BLD AUTO: 3.25 10E6/UL (ref 4.4–5.9)
RBC URINE: 2 /HPF
SARS-COV-2 RNA RESP QL NAA+PROBE: POSITIVE
SODIUM SERPL-SCNC: 139 MMOL/L (ref 133–144)
SP GR UR STRIP: 1.02 (ref 1–1.03)
SPECIMEN EXPIRATION DATE: NORMAL
SQUAMOUS EPITHELIAL: <1 /HPF
UNIT ABO/RH: NORMAL
UNIT ABO/RH: NORMAL
UNIT NUMBER: NORMAL
UNIT NUMBER: NORMAL
UNIT STATUS: NORMAL
UNIT STATUS: NORMAL
UNIT TYPE ISBT: 6200
UNIT TYPE ISBT: 6200
UROBILINOGEN UR STRIP-MCNC: 3 MG/DL
WBC # BLD AUTO: 8.3 10E3/UL (ref 4–11)
WBC URINE: 4 /HPF

## 2022-01-18 PROCEDURE — 96374 THER/PROPH/DIAG INJ IV PUSH: CPT | Mod: 59

## 2022-01-18 PROCEDURE — 99285 EMERGENCY DEPT VISIT HI MDM: CPT | Mod: 25

## 2022-01-18 PROCEDURE — 250N000009 HC RX 250: Performed by: EMERGENCY MEDICINE

## 2022-01-18 PROCEDURE — 86902 BLOOD TYPE ANTIGEN DONOR EA: CPT | Performed by: EMERGENCY MEDICINE

## 2022-01-18 PROCEDURE — 250N000013 HC RX MED GY IP 250 OP 250 PS 637: Performed by: EMERGENCY MEDICINE

## 2022-01-18 PROCEDURE — 86901 BLOOD TYPING SEROLOGIC RH(D): CPT | Performed by: EMERGENCY MEDICINE

## 2022-01-18 PROCEDURE — 250N000013 HC RX MED GY IP 250 OP 250 PS 637: Performed by: PHYSICIAN ASSISTANT

## 2022-01-18 PROCEDURE — 250N000011 HC RX IP 250 OP 636

## 2022-01-18 PROCEDURE — 82565 ASSAY OF CREATININE: CPT

## 2022-01-18 PROCEDURE — 85610 PROTHROMBIN TIME: CPT | Performed by: EMERGENCY MEDICINE

## 2022-01-18 PROCEDURE — 36415 COLL VENOUS BLD VENIPUNCTURE: CPT | Performed by: PHYSICIAN ASSISTANT

## 2022-01-18 PROCEDURE — 99204 OFFICE O/P NEW MOD 45 MIN: CPT | Performed by: SURGERY

## 2022-01-18 PROCEDURE — 36415 COLL VENOUS BLD VENIPUNCTURE: CPT | Performed by: INTERNAL MEDICINE

## 2022-01-18 PROCEDURE — 250N000011 HC RX IP 250 OP 636: Performed by: EMERGENCY MEDICINE

## 2022-01-18 PROCEDURE — 36415 COLL VENOUS BLD VENIPUNCTURE: CPT | Performed by: EMERGENCY MEDICINE

## 2022-01-18 PROCEDURE — 80053 COMPREHEN METABOLIC PANEL: CPT | Performed by: EMERGENCY MEDICINE

## 2022-01-18 PROCEDURE — 74177 CT ABD & PELVIS W/CONTRAST: CPT | Mod: MG

## 2022-01-18 PROCEDURE — 86922 COMPATIBILITY TEST ANTIGLOB: CPT | Performed by: EMERGENCY MEDICINE

## 2022-01-18 PROCEDURE — 99220 PR INITIAL OBSERVATION CARE,LEVEL III: CPT | Performed by: PHYSICIAN ASSISTANT

## 2022-01-18 PROCEDURE — 87635 SARS-COV-2 COVID-19 AMP PRB: CPT | Performed by: EMERGENCY MEDICINE

## 2022-01-18 PROCEDURE — 82962 GLUCOSE BLOOD TEST: CPT

## 2022-01-18 PROCEDURE — 85018 HEMOGLOBIN: CPT | Mod: 91 | Performed by: EMERGENCY MEDICINE

## 2022-01-18 PROCEDURE — 96376 TX/PRO/DX INJ SAME DRUG ADON: CPT

## 2022-01-18 PROCEDURE — G0378 HOSPITAL OBSERVATION PER HR: HCPCS

## 2022-01-18 PROCEDURE — C9803 HOPD COVID-19 SPEC COLLECT: HCPCS

## 2022-01-18 PROCEDURE — 85025 COMPLETE CBC W/AUTO DIFF WBC: CPT | Performed by: EMERGENCY MEDICINE

## 2022-01-18 PROCEDURE — 85018 HEMOGLOBIN: CPT | Performed by: PHYSICIAN ASSISTANT

## 2022-01-18 PROCEDURE — 81003 URINALYSIS AUTO W/O SCOPE: CPT | Performed by: EMERGENCY MEDICINE

## 2022-01-18 PROCEDURE — 96375 TX/PRO/DX INJ NEW DRUG ADDON: CPT

## 2022-01-18 RX ORDER — ONDANSETRON 4 MG/1
4 TABLET, ORALLY DISINTEGRATING ORAL EVERY 6 HOURS PRN
Status: DISCONTINUED | OUTPATIENT
Start: 2022-01-18 | End: 2022-01-19 | Stop reason: HOSPADM

## 2022-01-18 RX ORDER — ACETAMINOPHEN 325 MG/1
975 TABLET ORAL EVERY 8 HOURS
Status: DISCONTINUED | OUTPATIENT
Start: 2022-01-18 | End: 2022-01-19 | Stop reason: HOSPADM

## 2022-01-18 RX ORDER — HYDROMORPHONE HYDROCHLORIDE 1 MG/ML
0.5 INJECTION, SOLUTION INTRAMUSCULAR; INTRAVENOUS; SUBCUTANEOUS ONCE
Status: COMPLETED | OUTPATIENT
Start: 2022-01-18 | End: 2022-01-18

## 2022-01-18 RX ORDER — METHOCARBAMOL 500 MG/1
500 TABLET, FILM COATED ORAL 4 TIMES DAILY
Status: DISCONTINUED | OUTPATIENT
Start: 2022-01-18 | End: 2022-01-19 | Stop reason: HOSPADM

## 2022-01-18 RX ORDER — AMOXICILLIN 250 MG
2 CAPSULE ORAL 2 TIMES DAILY PRN
Status: DISCONTINUED | OUTPATIENT
Start: 2022-01-18 | End: 2022-01-19 | Stop reason: HOSPADM

## 2022-01-18 RX ORDER — OXYCODONE HYDROCHLORIDE 5 MG/1
5 TABLET ORAL EVERY 4 HOURS PRN
Status: DISCONTINUED | OUTPATIENT
Start: 2022-01-18 | End: 2022-01-19 | Stop reason: HOSPADM

## 2022-01-18 RX ORDER — NALOXONE HYDROCHLORIDE 0.4 MG/ML
0.4 INJECTION, SOLUTION INTRAMUSCULAR; INTRAVENOUS; SUBCUTANEOUS
Status: DISCONTINUED | OUTPATIENT
Start: 2022-01-18 | End: 2022-01-19 | Stop reason: HOSPADM

## 2022-01-18 RX ORDER — AMOXICILLIN 250 MG
1 CAPSULE ORAL 2 TIMES DAILY PRN
Status: DISCONTINUED | OUTPATIENT
Start: 2022-01-18 | End: 2022-01-19 | Stop reason: HOSPADM

## 2022-01-18 RX ORDER — IOPAMIDOL 755 MG/ML
500 INJECTION, SOLUTION INTRAVASCULAR ONCE
Status: COMPLETED | OUTPATIENT
Start: 2022-01-18 | End: 2022-01-18

## 2022-01-18 RX ORDER — ACETAMINOPHEN 500 MG
1000 TABLET ORAL ONCE
Status: COMPLETED | OUTPATIENT
Start: 2022-01-18 | End: 2022-01-18

## 2022-01-18 RX ORDER — NALOXONE HYDROCHLORIDE 0.4 MG/ML
0.2 INJECTION, SOLUTION INTRAMUSCULAR; INTRAVENOUS; SUBCUTANEOUS
Status: DISCONTINUED | OUTPATIENT
Start: 2022-01-18 | End: 2022-01-19 | Stop reason: HOSPADM

## 2022-01-18 RX ORDER — POLYETHYLENE GLYCOL 3350 17 G/17G
17 POWDER, FOR SOLUTION ORAL DAILY PRN
Status: DISCONTINUED | OUTPATIENT
Start: 2022-01-18 | End: 2022-01-19 | Stop reason: HOSPADM

## 2022-01-18 RX ORDER — ONDANSETRON 2 MG/ML
4 INJECTION INTRAMUSCULAR; INTRAVENOUS ONCE
Status: COMPLETED | OUTPATIENT
Start: 2022-01-18 | End: 2022-01-18

## 2022-01-18 RX ORDER — ONDANSETRON 2 MG/ML
4 INJECTION INTRAMUSCULAR; INTRAVENOUS EVERY 6 HOURS PRN
Status: DISCONTINUED | OUTPATIENT
Start: 2022-01-18 | End: 2022-01-19 | Stop reason: HOSPADM

## 2022-01-18 RX ORDER — LIDOCAINE 40 MG/G
CREAM TOPICAL
Status: DISCONTINUED | OUTPATIENT
Start: 2022-01-18 | End: 2022-01-19 | Stop reason: HOSPADM

## 2022-01-18 RX ORDER — ONDANSETRON 2 MG/ML
INJECTION INTRAMUSCULAR; INTRAVENOUS
Status: COMPLETED
Start: 2022-01-18 | End: 2022-01-18

## 2022-01-18 RX ORDER — LIDOCAINE 4 G/G
1 PATCH TOPICAL EVERY 24 HOURS
Status: DISCONTINUED | OUTPATIENT
Start: 2022-01-18 | End: 2022-01-19 | Stop reason: HOSPADM

## 2022-01-18 RX ADMIN — IOPAMIDOL 100 ML: 755 INJECTION, SOLUTION INTRAVENOUS at 13:00

## 2022-01-18 RX ADMIN — HYDROMORPHONE HYDROCHLORIDE 1 MG: 1 INJECTION, SOLUTION INTRAMUSCULAR; INTRAVENOUS; SUBCUTANEOUS at 17:38

## 2022-01-18 RX ADMIN — ACETAMINOPHEN 1000 MG: 500 TABLET, FILM COATED ORAL at 11:31

## 2022-01-18 RX ADMIN — ONDANSETRON 4 MG: 2 INJECTION INTRAMUSCULAR; INTRAVENOUS at 17:37

## 2022-01-18 RX ADMIN — PHYTONADIONE 1 MG: 10 INJECTION, EMULSION INTRAMUSCULAR; INTRAVENOUS; SUBCUTANEOUS at 17:39

## 2022-01-18 RX ADMIN — HYDROMORPHONE HYDROCHLORIDE 0.5 MG: 1 INJECTION, SOLUTION INTRAMUSCULAR; INTRAVENOUS; SUBCUTANEOUS at 11:28

## 2022-01-18 RX ADMIN — ACETAMINOPHEN 975 MG: 325 TABLET, FILM COATED ORAL at 22:18

## 2022-01-18 RX ADMIN — SODIUM CHLORIDE 65 ML: 9 INJECTION, SOLUTION INTRAVENOUS at 13:00

## 2022-01-18 RX ADMIN — LIDOCAINE 1 PATCH: 246 PATCH TOPICAL at 22:17

## 2022-01-18 RX ADMIN — ONDANSETRON 4 MG: 2 INJECTION INTRAMUSCULAR; INTRAVENOUS at 11:27

## 2022-01-18 RX ADMIN — OXYCODONE HYDROCHLORIDE 5 MG: 5 TABLET ORAL at 22:18

## 2022-01-18 RX ADMIN — METHOCARBAMOL 500 MG: 500 TABLET ORAL at 22:18

## 2022-01-18 ASSESSMENT — ENCOUNTER SYMPTOMS
SHORTNESS OF BREATH: 0
COLOR CHANGE: 1
NUMBNESS: 0
BACK PAIN: 1
HEMATURIA: 1
WEAKNESS: 0

## 2022-01-18 ASSESSMENT — MIFFLIN-ST. JEOR: SCORE: 2244.57

## 2022-01-18 NOTE — ED PROVIDER NOTES
History   Chief Complaint:  Back pain     The history is provided by the patient.      Todd S Aschoff is a 66 year old male on Coumadin for atrial fibrillation with other history of thoracic aorta dissection s/p aortic valve replacement who presents via EMS with R lower back pain. The patient did have a mechanical fall on 01/12/2022 at home where his foot slipped off the bottom step, landing on his lower back. He denies hitting his head or sustaining loss of consciousness. He was eventually able to rise to standing himself, since the fall several bruises have developed. The largest is over his L hip extending across his lower back. The patient felt that he had been recovering at home well, he was able to stand up straight and returned to walking. Yesterday morning however the patient started having pain in his R lower back just below his ribs. He has felt a bruise in that area and notes that his wife did not observe this when she examined him on 01/16/2022. The bruise does not involve his penis or scrotum. Patient took Tylenol last night for pain control but nothing since this time. He does note that his urine has been tea-colored, this is new since his fall on 1/12. He has not had any new falls. The patient denies any chest pain with inspiration. No shortness of breath. Patient has not recorded his temperature as he does not have a thermometer at home. No numbness or weakness to lower extremities. He does not use a cane or walker to ambulate at baseline. Denies any personal history of kidney problems. The patient's last INR was 2.7 on 01/06/2022, he thinks he has a repeat level scheduled for sometime this week.    Review of Systems   Constitutional: Fever: did not record.   Respiratory: Negative for shortness of breath.    Cardiovascular: Negative for chest pain.   Genitourinary: Positive for hematuria (tea-colored).   Musculoskeletal: Positive for back pain (R lower).   Skin: Positive for color change (bruising  to L hip, R lower back, L abdomen).   Neurological: Negative for syncope, weakness and numbness.   All other systems reviewed and are negative.    Allergies:  Gabapentin    Medications:  Aspirin 81 mg  Diazepam  Donepezil  Guanfacine  Levothyroxine  Mirabegron  Pregabalin  Propafenone  Venlafaxine  Coumadin    Past Medical History:     Alcohol dependence  Atrial fibrillation  Thoracic aorta dissection  Hyperlipidemia  Hypothyroidism  Depression    Past Surgical History:    Aortic valve replacement  Wound vac  Right cataract extraction with lens implant  Cholecystectomy  Skin graft to LLE  Colonoscopy  L3-4 decompression  C4-6 decompression  Elbow surgery  Spinal exploration with hardware removal  Anterior two-level cervical fusion  Lumbar epidural injection  I & D of LLE  I & D of spine  Orbit procedure  Spine surgery  Neck/thorax surgery  Left AMANDA  Cyst excision from back  [place] teeth extraction  TAA repair with graft  R hip replacement    Family History:    Father - diabetes, cerebrovascular disease, MI, prostate cancer  Mother - dementia, lymphoma  Brother x2 - hypertension, sleep apnea    Social History:  The patient was accompanied to the ED by EMS.  Marital status:     Physical Exam     Patient Vitals for the past 24 hrs:   BP Temp Temp src Pulse Resp SpO2   01/18/22 1230 -- -- -- -- -- 99 %   01/18/22 1215 (!) 148/85 -- -- 79 -- 99 %   01/18/22 1200 (!) 129/90 -- -- 78 -- 100 %   01/18/22 1145 138/86 -- -- 78 -- 99 %   01/18/22 1130 124/77 -- -- 77 -- 93 %   01/18/22 1115 139/86 -- -- 77 -- 92 %   01/18/22 1100 135/86 -- -- 78 -- 96 %   01/18/22 1003 124/74 97.5  F (36.4  C) Temporal 81 20 94 %       Physical Exam    HENT: Atraumatic, normocephalic, midface stable, mmm, no rhinorrhea  Eyes: periorbital tissues and sclera normal, pupils equal reactive, extraocular movement intact  Neck: supple, no abnormal swelling, no midline tenderness, painless range of motion  Lungs:  CTAB,  no resp distress,  tenderness to deep posterior inferior most aspect of the right thoracic cage.  No crepitus  CV: rrr, no m/r/g, ppi  Abd: soft, nontender, nondistended, no rebound/masses/guarding/hsm  Ext: no peripheral edema, bony skeletal survey remarkable for lumbar spinal tenderness just left of midline.  No significant upper or lower extremity bony tenderness palpation  Skin: Large area of ecchymoses present on the left flank extending from the suprapubic area around the left flank towards the right side of the midline of the lower back.  There is induration tension here maximal just superior to the iliac crest.  Ecchymoses extends onto the proximal anterior thigh.  There is another area of ecchymoses present in the right back 2/01.  Neuro: alert, cranial nerves II to XII intact and symmetric, speech clear MAEE, no gross motor or sensory deficits, gait stable  Psych: Normal mood, normal affect      Emergency Department Course     Imaging:  CT Chest/Abdomen/Pelvis w Contrast:  1.  Acute fractures at the right posterior 11th and 12th ribs. Acute fractures of the right lateral L1 and L2 transverse processes.  2.  Prominent subcutaneous hematoma at the left flank and extending to the left posterior pelvic level. Tiny opacities at the right posterior back may represent small subcutaneous contusion.  3.  No other acute traumatic abnormality is seen.  4.  Stable postoperative change of the ascending thoracic aorta and aortic valve.  Report per radiology.    Laboratory:  CBC: WBC 8.3, HGB 10.4 (L),   CMP: Glucose 110 (H), protein total 6.5 (L), albumin 2.7 (L), total bilirubin 2.6 (H), o/w WNL (Creatinine 0.81)  IStat Creatinine (collected 1128): 0.8    INR: 3.82 (H)    UA with microscopic: Orange color, ketones 15, protein albumin 30, urobilinogen 3.0, mucus present, o/w WNL      ABO/Rh type and screen: A positive    Procedures  None.    Emergency Department Course:    Reviewed:  I reviewed nursing notes, vitals, past medical  history and Care Everywhere    Assessments:  1051 I obtained history and examined the patient in ED room 30 as noted above.   1419 I rechecked the patient and explained findings.       Consults:      Interventions:  1127 Zofran 4 mg IV  1128 Dilaudid 0.5 mg IV  1131 Tylenol 1,000 mg PO    Admit to observation    Impression & Plan       Medical Decision Makin-year-old male currently on warfarin secondary to mechanical aortic valve with an IR goal of 2.5-3.5 here after an accidental fall 6 days ago landing onto his low back.  Found to have acute blood loss anemia in addition to rib fractures of the 11th and 12th ribs without complicating hemothorax or pneumothorax as well as lumbar transverse process fractures.  No concern given his reported types of injury of significant head or neck injury.  CT with no evidence of solid organ injury.  Will admit for observation for symptom control mobilization as well as monitoring of his hemoglobin.  Baseline hemoglobin is 14 most recent is 15 today's first level was 10.5 and recheck with 9.5.  No indication for transfusion at present we will monitor hemoglobin transfuse if less than 7.  Nothing on the CT scan that would require an interventional radiology procedure.  Given time since injury no indication for transfusion would hold off on TXA or FFP or PCC's for reversal of his warfarin.  Was given simple p.o. vitamin K.  He did take a dose of warfarin this morning and it was 7.5 mg.  1 transient period of hypoxia into the upper 80s while he was laying in the bed and resting after began with a dose of Dilaudid.  Did not need continued supplemental oxygen here in ED.      Diagnosis:    ICD-10-CM    1. Fall, initial encounter  W19.XXXA    2. Closed fracture of multiple ribs of right side, initial encounter  S22.41XA    3. Closed fracture of transverse process of cervical vertebra, initial encounter (H)  S12.9XXA    4. Traumatic hematoma of flank, initial encounter  S30.1XXA     5. Anemia due to blood loss, acute  D62          Scribe Disclosure:  I, Agustina Wagner, am serving as a scribe at 10:47 AM on 1/18/2022 to document services personally performed by Danny Rahman MD based on my observations and the provider's statements to me.     This note was completed in part using Dragon voice recognition software. Although reviewed after completion, some word and grammatical errors may occur.               Danny Rahman MD  01/18/22 5109

## 2022-01-18 NOTE — H&P
History and Physical     Todd S Aschoff MRN# [id number]   YOB: 1955 Age: 66 year old      Date of Admission:  1/18/2022    Primary care provider: Herve Morgan          Assessment and Plan:   Todd S Aschoff is a medically complex 66 year old male with a PMH significant for thoracic aorta dissection with repair requiring mechanical aortic valve replacement on warfarin, atrial fibrillation, alcoholic dependence in remission, hyperlipidemia, hypothyroidism, BPH, chronic back pain who presents with right flank pain and increased bruising on left flank after a fall on 1/12.    Patient was discussed with Dr. Rahman, who was provider in ED. Chart review of ED work up was reviewed as well as chart review of Care Everywhere, previous visits and admissions.  Dr. Lane, from general surgery was called from ED due to the traumatic nature of injuries.    COVID 19 positive after admission. I discussed with wife. Patient is vaccinated and had COVID symptoms 3 weeks ago but was not officially tested because his whole family had it.  -COVID precautions added    #Right posterior 11th and 12th rib fractures s/p mechanical fall  -Mechanical fall on 1/12 off of bottom step landing on low back  -Pain with movement but not deep inspiration  -Schedule Tylenol and Robaxin, oxycodone as needed  -Ice frequently  -General surgery consult for trauma eval    #Acute fractures of the right lateral L1 and L2 transverse processes  -S/p mechanical fall as above  -Does not describe much pain for me in this area  -Spine surgery consult  -Pain plan as above    #Hematoma of left flank extending to the left posterior pelvic level  -Significant bruising with central induration of left flank s/p mechanical fall  -Vitally stable without lightheadedness or dizziness today  -Hemoglobin 10.4 now 9.6  -Anticoagulated with warfarin (goal 2.5-3.5) and INR of 3.82  -After discussion with general surgery was given vitamin K 1 mg  -Hold  warfarin  -Trend hemoglobins every 8 hours  -May need further vitamin K  -Abdominal binder ordered  -Frequent icing    #Thoracic aortic dissection requiring repair and [employer] F mechanical aortic valve replacement  -Anticoagulated on warfarin with goal INR of 2.5-3.5  -Holding warfarin for now    #Atrial fibrillation  -continue Propafenone    #Hypothyroidism  -Continue levothyroxine    #Chronic pain  -Continue Lyrica    #HLP  -Continue simvastatin      Social: No concerns  Code: Discussed with patient and they have chosen full code  VTE prophylaxis: INR >3.5, may need PCDs  Disposition: Observation                    Chief Complaint:   Right flank/back pain and left flank/thigh bruising         History of Present Illness:   Todd S Aschoff is a 66 year old male who presents with complaints of recurrent left flank bruising and increased right mid back pain.  He reports falling on 1/12 when his foot slipped off the bottom step landing on his lower back.  He denied head trauma or loss of consciousness.  He laid there for a while but was eventually able to get himself up and noted some bruising.  He felt like the bruising improved but then noted pain on 1/17 in his right lower back just below his rib cage.  He also noted worsening of the bruise on his left flank.  He has been using Tylenol, ice and heat.  When he walks he notes pain in his right mid back.  He has felt lightheaded/dizzy over the past couple days but not today.  He has not had fever, cough, shortness of breath or chest discomfort.  He denies dysuria but has noted that his urine is tea colored.  He has been taking all of his medicines as prescribed.  He denies alcohol and tobacco use.             Past Medical History:     Past Medical History:   Diagnosis Date     Advanced directives, counseling/discussion 1/6/2012    Discussed Advance Directive planning with patient; information given to patient to review.     Alcohol dependence (H)      Alcohol  dependence in remission (H) 8/2/2018     Allergy, unspecified not elsewhere classified     seasonal     Anemia, unspecified type 1/22/2021     Aortic valve prosthesis present 2/8/2021     Atrial fibrillation (H)      Benign prostatic hyperplasia 2/8/2021     Bilateral thoracic back pain 11/16/2016     BPH (benign prostatic hypertrophy)      Chronic atrial fibrillation (H) 8/7/2002     Chronic infection     low back wound incision not healing      Chronic low back pain 8/19/2011     Chronic pain     lower back and right leg and left leg     Depressive disorder 6/9/2010    Depression  NOS     Dissection of aorta, thoracic (H) 1992    [employer] Jose F aotic valve + arch graft 1992     Elevated prostate specific antigen (PSA) 1/22/2020     Family history of prostate cancer 1/22/2020     Generalized muscle weakness 1/22/2021     Headache(784.0)      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 Provider to review     Leg wound, left 1/9/2021     Long term current use of anticoagulant therapy 8/7/2002    LW Modifier:  Coumadin, since mechanical aortic valve LW Onset:  1992 ; Anticoagulant Rx Long Term Problem list name updated by automated process. Provider to review     Low back pain      Lumbar radiculopathy 4/3/2013     Lumbar surgical wound fluid collection 5/23/2013     Major depression      Memory difficulties 1/16/2015     Mixed hyperlipidemia      Morbid obesity (H) 4/20/2010    LW Modifier:  BMI 41.3 (Apr 2010) ; Obesity Morbid     Numbness and tingling     right leg post surg rightt hip/ also left leg since surg     OA (osteoarthritis)     hips     OAB (overactive bladder) 5/11/2015     Obesity, unspecified      Persons encountering health services in other specified  circumstances 4/3/2012    Formatting of this note might be different from the original. EMERGENCY CARE PLAN Presenting Problem Signs and Symptoms Treatment Plan   Questions or conerns during clinic hours    I will call the clinic directly    Questions or conerns outside clinic hours    I will call the 24 hour nurse line at [phone]   Patient needs to schedule an appointment    I will call the 24 hour scheduling team at     Polypharmacy 1/22/2021     Prostate infection      Radiculitis 4/20/2010    LW Modifier:  Right foot, s/p R AMANDA LW Onset:  2002 ; Neuralgia     Recurrent major depressive episodes, in full remission (H) 10/30/2012     Renal insufficiency 1/22/2021     Rotator cuff tear 1/26/2015    Rotator cuff tear, right     S/P lumbar fusion 4/5/2018     S/P 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 Jose F's valve     APPLY WOUND VAC Left 1/26/2021    Procedure: Placement of negative pressure wound therapy 2,400 squared centimeters  ;  Surgeon: Lazaro Plascencia MD;  Location: [facility] OR     CATARACT IOL, RT/LT      rt eye only     CHOLECYSTECTOMY, LAPOROSCOPIC  8/10     CLOSE SECONDARY WOUND LOWER EXTREMITY Left 2/3/2021    Procedure:  Split Thickness Skin Graft to Leg of 18 square centimeters  Intermediate Closure of Leg of 8cm   ;  Surgeon: Lazaro Plascencia MD;  Location: [facility] OR     COLONOSCOPY N/A 1/15/2015    Procedure: COLONOSCOPY;  Surgeon: Johnson Kothari MD;  Location:  GI     DECOMPRESSION LUMBAR ONE  LEVEL  4/3/2013    Procedure: DECOMPRESSION LUMBAR ONE LEVEL;  Open Decompression L3-4 bilateral;  Surgeon: Khalif Coffey MD;  Location: [facility] OR     DECOMPRESSION, FUSION CERVICAL ANTERIOR ONE LEVEL, COMBINED  3/23/2012    Procedure:COMBINED DECOMPRESSION, FUSION CERVICAL ANTERIOR ONE LEVEL; Anterior Cervical Decompression and Fusion C4-6; Surgeon:KHALIF COFFEY; Location:[facility] OR     ELBOW SURGERY       EXPLORE SPINE, REMOVE HARDWARE, COMBINED  5/23/2013    Procedure: COMBINED EXPLORE SPINE, REMOVE HARDWARE;  Exploration Lumbar Wound for fluid collection;  Surgeon: Khalif Coffey MD;  Location: [facility] OR     FUSION CERVICAL ANTERIOR TWO LEVELS  3/26/2012    Procedure:FUSION CERVICAL ANTERIOR TWO LEVELS; Anterior Cervical Fusion C4-6, Anterior Cervical  Hematoma Evacuation; Surgeon:KHALIF COFFEY; Location:[facility] OR     IR LUMBAR EPIDURAL STEROID INJECTION  3/28/2003     IRRIGATION AND DEBRIDEMENT LOWER EXTREMITY, COMBINED Left 1/10/2021    Procedure: 1.  Irrigation and excisional debridement to muscle left distal medial calf chronic wounds total area measuring 9 cm x 4 cm 2.  Irrigation and excisional debridement to fascia left medial calf chronic hematoma measuring 29 x 6.7 x 4.2 cm 3.  Primary complex wound closure left medial distal calf 9 cm in length;  Surgeon: Rod Kemp MD;  Location: [facility] OR     IRRIGATION AND DEBRIDEMENT LOWER EXTREMITY, COMBINED Left 1/26/2021    Procedure: Evacuation of hematoma debridement of skin, subcutaneous tissue and muscle 2,400 squared centimeters, placement of negative pressure wound therapy 2,400 squared centimeters;  Surgeon: Lazaro Plascencia MD;  Location: [facility]     IRRIGATION AND DEBRIDEMENT SPINE, CLOSE WOUND, COMBINED  3/26/2012    Procedure:COMBINED IRRIGATION AND DEBRIDEMENT SPINE, CLOSE WOUND; Surgeon:KHALIF COFFEY; Location:[facility] OR     OTHER SURGICAL HISTORY      DC UNLISTED ORBIT     OTHER SURGICAL HISTORY      DC UNLISTED SPINE     OTHER  SURGICAL HISTORY      PA UNLISTED NECK/THORAX     OTHER SURGICAL HISTORY      (IA) PA TOTAL HIP ARTHROPLASTY     OTHER SURGICAL HISTORY      (IA) PA NEE SCOPE MED W LAT MENISCECT WWO DEBRIBE/SHAVE ANY CO     removal of cyst of back   2.5week     TONSILLECTOMY       wisdom teeth[       ZZC NONSPECIFIC PROCEDURE  1992    repair of TAA with graft     Z TOTAL HIP ARTHROPLASTY  2010    Left AMANDA     Z TOTAL HIP ARTHROPLASTY  2002    R hip replacement comp's by nerve injury with pain down into R leg, nadya below knee               Social History:     Social History     Socioeconomic History     Marital status:      Spouse name: Not on file     Number of children: 3     Years of education: Not on file     Highest education level: High school graduate   Occupational History     Employer: DISABLED     Comment: Previous , disabled since ~2013   Tobacco Use     Smoking status: Never Smoker     Smokeless tobacco: Never Used   Vaping Use     Vaping Use: Never used   Substance and Sexual Activity     Alcohol use: No     Comment: Stopped drinking alcohol ~2009     Drug use: No     Sexual activity: Not Currently   Other Topics Concern     Parent/sibling w/ CABG, MI or angioplasty before 65F 55M? 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  (last updated 6/18/2021)      Social Determinants of Health     Financial Resource Strain: Low Risk      Difficulty of Paying Living Expenses: Not hard at all   Food Insecurity: No Food Insecurity     Worried About Running Out of Food in the Last Year: Never true     Ran Out of Food in the Last Year: Never true   Transportation Needs: No Transportation Needs     Lack of Transportation (Medical): No     Lack of Transportation (Non-Medical): No   Physical Activity: Inactive     Days of Exercise per Week: 0 days     Minutes of Exercise per Session: 30 min   Stress: No Stress Concern Present     Feeling  of Stress : Not at all   Social Connections: Unknown     Frequency of Communication with Friends and Family: More than three times a week     Frequency of Social Gatherings with Friends and Family: Patient refused     Attends Mu-ism Services: Patient refused     Active Member of Clubs or Organizations: No     Attends Club or Organization Meetings: Not on file     Marital Status:    Intimate Partner Violence: Not on file   Housing Stability: Low Risk      Unable to Pay for Housing in the Last Year: No     Number of Places Lived in the Last Year: 1     Unstable Housing in the Last Year: No               Family History:     Family History   Problem Relation Age of Onset     Cerebrovascular Disease Father          age 86, had M.I. ,diabetic also     Prostate Cancer Father      Diabetes Father      Cancer Mother          age 83 of dementia, also h/o lymphoma     Diabetes Mother      Hypertension Brother         2 brothers with hypertension & sleep apnea     Hypertension Sister         Born ~1936     Sleep Apnea Brother          age 78, Alzheimers     No Known Problems Son      No Known Problems Daughter      No Known Problems Son      Family History Negative Brother         Had 5 brothers, three still alive as of      Colon Cancer No family hx of             Allergies:      Allergies   Allergen Reactions     Gabapentin      Severe behavioral disturbances               Medications:     Prior to Admission medications    Medication Sig Last Dose Taking? Auth Provider   acetaminophen (TYLENOL) 500 MG tablet Take 500-1,000 mg by mouth 2 times daily    Unknown, Entered By History   aspirin (ASA) 81 MG chewable tablet Take 81 mg by mouth daily    Unknown, Entered By History   cetirizine (ZYRTEC) 10 MG tablet Take 10 mg by mouth every morning    Unknown, Entered By History   cyclobenzaprine (FLEXERIL) 10 MG tablet TAKE ONE TABLET BY MOUTH THREE TIMES DAILY AS NEEDED FOR MUSCLE SPASM.   Herve Morgan MD    diazepam (VALIUM) 5 MG tablet Take 1 tablet (5 mg) by mouth nightly as needed for anxiety   Herve Morgan MD   donepezil (ARICEPT) 10 MG tablet Take 1 tablet (10 mg) by mouth At Bedtime   Herve Morgan MD   enoxaparin ANTICOAGULANT (LOVENOX) 100 MG/ML syringe Inject 1 mL (100 mg) Subcutaneous every 12 hours   Herve Morgan MD   folic acid (FOLVITE) 1 MG tablet Take 5 tablets  by mouth daily   Herve Morgan MD   guanFACINE (TENEX) 1 MG tablet Take 1 tablet (1 mg) by mouth At Bedtime   Herve Morgan MD   levothyroxine (SYNTHROID/LEVOTHROID) 150 MCG tablet Take 1 tablet (150 mcg) by mouth daily   Obdulio Ingram MD   mirabegron (MYRBETRIQ) 50 MG 24 hr tablet Take 50 mg by mouth every evening   Unknown, Entered By History   pregabalin (LYRICA) 100 MG capsule Take 1 capsule (100 mg) by mouth 3 times daily Do not take if sleepy/sedated.   Herve Morgan MD   propafenone (RYTHMOL) 150 MG TABS tablet Take 1 tablet (150 mg) by mouth every 8 hours   Herve Morgan MD   simvastatin (ZOCOR) 40 MG tablet Take 1 tablet (40 mg) by mouth At Bedtime. Need to update cholesterol level before additional refills.   Herve Morgan MD   venlafaxine (EFFEXOR-XR) 75 MG 24 hr capsule Take 3 capsules (225 mg) by mouth daily   Herve Morgan MD   warfarin ANTICOAGULANT (COUMADIN) 2.5 MG tablet Take 7.5 mg (3 tabs) every Sun, Tue, Fri; 5 mg (2 tabs) all other days or as directed by the Anticoagulation Clinic   Herve Morgan MD   warfarin ANTICOAGULANT (COUMADIN) 2.5 MG tablet Take 5 mg by mouth daily Sun, Wed, Fri    Unknown, Entered By History   warfarin ANTICOAGULANT (COUMADIN) 5 MG tablet Current dose (12/8/21): 7.5 mg every Sun, Wed + 5 mg all other days or as directed by ACC team.   Herve Morgan MD              Review of Systems:   A Comprehensive greater than 10 system review of systems was carried out.  Pertinent positives and negatives are noted above.  Otherwise negative for contributory information.            Physical  Exam:   Blood pressure 122/74, pulse 77, temperature 97.5  F (36.4  C), temperature source Temporal, resp. rate 20, SpO2 94 %.  Exam:  GENERAL:  Comfortable.  PSYCH: pleasant, oriented, No acute distress.  HEENT:  PERRLA. Normal conjunctiva, normal hearing, nasal mucosa and Oropharynx are normal.  NECK:  Supple, no neck vein distention, adenopathy or bruits, normal thyroid.  HEART:  Normal S1, S2 with loud click, no pericardial rub, gallops or S3 or S4.  LUNGS:  Clear to auscultation, normal Respiratory effort. No wheezing, rales or ronchi.  ABDOMEN:  Large area of hematoma on left flank spreading into left back and left thigh. Area of induration on left lateral abdomen.   EXTREMITIES:  No pedal edema, +2 pulses bilateral and equal.  SKIN:  Dry to touch, No rash, wound or ulcerations.  NEUROLOGIC:  CN 2-12 grossly intact,  sensation is intact with no focal deficits.               Data:     Recent Labs   Lab 01/18/22  1433 01/18/22  1020   WBC  --  8.3   HGB 9.6* 10.4*   HCT  --  31.8*   MCV  --  98   PLT  --  274     Recent Labs   Lab 01/18/22  1128 01/18/22  1020   NA  --  139   POTASSIUM  --  3.8   CHLORIDE  --  105   CO2  --  31   ANIONGAP  --  3   GLC  --  110*   BUN  --  19   CR 0.8 0.81   GFRESTIMATED >60 >90   CATERINA  --  8.6   PROTTOTAL  --  6.5*   ALBUMIN  --  2.7*   BILITOTAL  --  2.6*   ALKPHOS  --  89   AST  --  18   ALT  --  21     Recent Labs   Lab 01/18/22  1020   INR 3.82*         Recent Results (from the past 24 hour(s))   CT Chest/Abdomen/Pelvis w Contrast    Narrative    CT CHEST/ABDOMEN/PELVIS WITH CONTRAST  1/18/2022 1:14 PM    CLINICAL HISTORY: Fall, right and left flank pain, large left flank  hematoma, back pain. Coumadin.    TECHNIQUE: CT scan of the chest, abdomen, and pelvis was performed  following injection of IV contrast. Multiplanar reformats were  obtained. Dose reduction techniques were used.   CONTRAST: 100 mL [id number]    COMPARISON: CT chest, abdomen and pelvis  5/18/2021.    FINDINGS:   LUNGS AND PLEURA: No effusions. No pneumothorax. No acute airspace  disease.    MEDIASTINUM/AXILLAE: No acute thoracic aortic abnormality. Stable  configuration of ascending aortic endograft and aortic valve  postoperative changes. The proximal aspect of the thoracic aorta is  4.4 cm, stable. No acute mediastinal abnormality. No suspicious lymph  nodes.    CORONARY ARTERY CALCIFICATION: Severe.    HEPATOBILIARY: Cholecystectomy. No acute liver abnormality.    PANCREAS: Normal.    SPLEEN: Normal.    ADRENAL GLANDS: Normal.    KIDNEYS/BLADDER: Incidental left-sided parapelvic renal cysts without  specific follow-up recommended. No acute renal abnormality. No  hydronephrosis. Small cyst or angiomyolipoma at the posterior right  kidney appears stable.    BOWEL: No obstruction or inflammation. Normal appendix.    PELVIC ORGANS: No acute abnormality. Prostate is 5.4 cm. Streak  artifact obscures structures.    ADDITIONAL FINDINGS: Prominent subcutaneous lobulated hematoma with  surrounding edema along the left lateral posterior flank extending  posterior and lateral to the gluteal level. A locule of blood is 5.3  cm in this region, series 3 image 238. There are other locules noted.  Small subcutaneous contusion suggested along the right posterior lower  back, series 3 image 136.    MUSCULOSKELETAL: Acute fractures involving the right posterior 11th  and 12th ribs. Acute fractures involving the right lateral L1 and L2  transverse processes. Diffuse spine degenerative changes. Right hip  arthroplasty. A few subacute healed rib fractures.  Sternotomy.      Impression    IMPRESSION:  1.  Acute fractures at the right posterior 11th and 12th ribs. Acute  fractures of the right lateral L1 and L2 transverse processes.  2.  Prominent subcutaneous hematoma at the left flank and extending to  the left posterior pelvic level. Tiny opacities at the right posterior  back may represent small subcutaneous  contusion.  3.  No other acute traumatic abnormality is seen.  4.  Stable postoperative change of the ascending thoracic aorta and  aortic valve.    RAYO CHIN MD         SYSTEM ID:  [id number]         Amanda Andrew PA-C

## 2022-01-18 NOTE — ED TRIAGE NOTES
Pt arrives due to increased pain on R flank after fall wednesday. Pt pale. EMS reports urine being tea colored, after being shown by pt. 10/10 pain prior to 100mc fentanyl. 22g right wrist.

## 2022-01-18 NOTE — ED NOTES
[facility]  ED Nurse Handoff Report    Todd S Aschoff is a 66 year old male   ED Chief complaint: Back Pain  . ED Diagnosis:   Final diagnoses:   Fall, initial encounter   Closed fracture of multiple ribs of right side, initial encounter   Closed fracture of transverse process of cervical vertebra, initial encounter (H)   Traumatic hematoma of flank, initial encounter     Allergies:   Allergies   Allergen Reactions     Gabapentin      Severe behavioral disturbances       Code Status: Full Code  Activity level - Baseline/Home:  Independent. Activity Level - Current:   Stand by Assist. Lift room needed: No. Bariatric: No   Needed: No   Isolation: No. Infection: Not Applicable.     Vital Signs:   Vitals:    01/18/22 1515 01/18/22 1530 01/18/22 1600 01/18/22 1615   BP:   122/74    Pulse: 83 79 77    Resp:       Temp:       TempSrc:       SpO2: 100% 90% 93% 94%       Cardiac Rhythm:  ,      Pain level:    Patient confused: No. Patient Falls Risk: Yes.   Elimination Status: Has voided   Patient Report - Initial Complaint: back pain . Focused Assessment: Todd S Aschoff is a 66 year old male on Coumadin for atrial fibrillation with other history of thoracic aorta dissection s/p aortic valve replacement who presents via EMS with R lower back pain. The patient did have a mechanical fall on 01/12/2022 at home where his foot slipped off the bottom step, landing on his lower back. He denies hitting his head or sustaining loss of consciousness. He was eventually able to rise to standing himself, since the fall several bruises have developed. The largest is over his L hip extending across his lower back. The patient felt that he had been recovering at home well, he was able to stand up straight and returned to walking. Yesterday morning however the patient started having pain in his R lower back just below his ribs. He has felt a bruise in that area and notes that his wife did not observe this when she  examined him on 01/16/2022. The bruise does not involve his penis or scrotum. Patient took Tylenol last night for pain control but nothing since this time. He does note that his urine has been tea-colored, this is new since his fall on 1/12. He has not had any new falls. The patient denies any chest pain with inspiration. No shortness of breath. Patient has not recorded his temperature as he does not have a thermometer at home. No numbness or weakness to lower extremities. He does not use a cane or walker to ambulate at baseline. Denies any personal history of kidney problems. The patient's last INR was 2.7 on 01/06/2022, he thinks he has a repeat level scheduled for sometime this week.     Review of Systems   Constitutional: Fever: did not record.   Respiratory: Negative for shortness of breath.    Cardiovascular: Negative for chest pain.   Genitourinary: Positive for hematuria (tea-colored).   Musculoskeletal: Positive for back pain (R lower).   Skin: Positive for color change (bruising to L hip, R lower back, L abdomen).   Neurological: Negative for syncope, weakness and numbness.   All other systems reviewed and are negative.  Tests Performed: labs, CT . Abnormal Results:   Labs Ordered and Resulted from Time of ED Arrival to Time of ED Departure   INR - Abnormal       Result Value    INR 3.82 (*)    COMPREHENSIVE METABOLIC PANEL - Abnormal    Sodium 139      Potassium 3.8      Chloride 105      Carbon Dioxide (CO2) 31      Anion Gap 3      Urea Nitrogen 19      Creatinine 0.81      Calcium 8.6      Glucose 110 (*)     Alkaline Phosphatase 89      AST 18      ALT 21      Protein Total 6.5 (*)     Albumin 2.7 (*)     Bilirubin Total 2.6 (*)     GFR Estimate >90     ROUTINE UA WITH MICROSCOPIC REFLEX TO CULTURE - Abnormal    Color Urine Orange (*)     Appearance Urine Clear      Glucose Urine Negative      Bilirubin Urine Negative      Ketones Urine 15  (*)     Specific Gravity Urine 1.025      Blood Urine  Negative      pH Urine 6.0      Protein Albumin Urine 30  (*)     Urobilinogen Urine 3.0 (*)     Nitrite Urine Negative      Leukocyte Esterase Urine Negative      Mucus Urine Present (*)     RBC Urine 2      WBC Urine 4      Squamous Epithelials Urine <1     CBC WITH PLATELETS AND DIFFERENTIAL - Abnormal    WBC Count 8.3      RBC Count 3.25 (*)     Hemoglobin 10.4 (*)     Hematocrit 31.8 (*)     MCV 98      MCH 32.0      MCHC 32.7      RDW 13.8      Platelet Count 274      % Neutrophils 71      % Lymphocytes 15      % Monocytes 11      % Eosinophils 2      % Basophils 0      % Immature Granulocytes 1      NRBCs per 100 WBC 0      Absolute Neutrophils 5.9      Absolute Lymphocytes 1.2      Absolute Monocytes 0.9      Absolute Eosinophils 0.1      Absolute Basophils 0.0      Absolute Immature Granulocytes 0.1      Absolute NRBCs 0.0     HEMOGLOBIN - Abnormal    Hemoglobin 9.6 (*)    ISTAT CREATININE POCT - Normal    Creatinine POCT 0.8      GFR, ESTIMATED POCT >60     COVID-19 VIRUS (CORONAVIRUS) BY PCR   ISTAT CREATININE POCT   TYPE AND SCREEN, ADULT    ABO/RH(D) A POS      Antibody Screen Negative      SPECIMEN EXPIRATION DATE [id number]     PREPARE RED BLOOD CELLS (UNIT)    CROSSMATCH COMPATIBLE      UNIT ABO/RH A Pos      Unit Number [id number]      Unit Status Ready      Blood Component Type Red Blood Cells      Product Code G2293Q09      CODING SYSTEM [id number]      UNIT TYPE ISBT 6200     PREPARE RED BLOOD CELLS (UNIT)    CROSSMATCH COMPATIBLE      UNIT ABO/RH A Pos      Unit Number [id number]      Unit Status Ready      Blood Component Type Red Blood Cells      Product Code E1094P89      CODING SYSTEM [id number]      UNIT TYPE ISBT 6200     ABO/RH TYPE AND SCREEN     CT Chest/Abdomen/Pelvis w Contrast   Final Result   IMPRESSION:   1.  Acute fractures at the right posterior 11th and 12th ribs. Acute   fractures of the right lateral L1 and L2 transverse processes.   2.  Prominent subcutaneous hematoma at  the left flank and extending to   the left posterior pelvic level. Tiny opacities at the right posterior   back may represent small subcutaneous contusion.   3.  No other acute traumatic abnormality is seen.   4.  Stable postoperative change of the ascending thoracic aorta and   aortic valve.      RAYO CHIN MD            SYSTEM ID:  [id number]      .   Treatments provided: see MAR  Family Comments: wife aware of admit  OBS brochure/video discussed/provided to patient:  Yes  ED Medications:   Medications   phytonadione (MEPHYTON/VITAMIN K) 1 MG/ML oral solution 1 mg (has no administration in time range)   acetaminophen (TYLENOL) tablet 1,000 mg (1,000 mg Oral Given 1/18/22 1131)   HYDROmorphone (PF) (DILAUDID) injection 0.5 mg (0.5 mg Intravenous Given 1/18/22 1128)   ondansetron (ZOFRAN) injection 4 mg (4 mg Intravenous Given 1/18/22 1127)   CT Scan Flush (65 mLs Intravenous Given 1/18/22 1300)   iopamidol (ISOVUE-370) solution 500 mL (100 mLs Intravenous Given 1/18/22 1300)     Drips infusing:  No  For the majority of the shift, the patient's behavior Green. Interventions performed were n/a.    Sepsis treatment initiated: No     Patient tested for COVID 19 prior to admission: YES    ED Nurse Name/Phone Number: Ayaan Caldera RN,   4:28 PM    RECEIVING UNIT ED HANDOFF REVIEW    Above ED Nurse Handoff Report was reviewed: YES  Reviewed by: Alicia Stahl RN on January 18, 2022 at 8:25 PM

## 2022-01-18 NOTE — ED NOTES
Medicare Outpatient Observation Notice (MOON) reviewed with pt. Letter signed and dated by pt. Copy faxed to medical record, original placed in chart and copy given to pt.

## 2022-01-19 ENCOUNTER — APPOINTMENT (OUTPATIENT)
Dept: GENERAL RADIOLOGY | Facility: CLINIC | Age: 67
End: 2022-01-19
Attending: PHYSICIAN ASSISTANT
Payer: COMMERCIAL

## 2022-01-19 VITALS
HEIGHT: 74 IN | TEMPERATURE: 98.2 F | WEIGHT: 306.4 LBS | SYSTOLIC BLOOD PRESSURE: 113 MMHG | HEART RATE: 78 BPM | BODY MASS INDEX: 39.32 KG/M2 | DIASTOLIC BLOOD PRESSURE: 70 MMHG | RESPIRATION RATE: 18 BRPM | OXYGEN SATURATION: 92 %

## 2022-01-19 LAB
ANION GAP SERPL CALCULATED.3IONS-SCNC: 2 MMOL/L (ref 3–14)
BUN SERPL-MCNC: 19 MG/DL (ref 7–30)
CALCIUM SERPL-MCNC: 8.6 MG/DL (ref 8.5–10.1)
CHLORIDE BLD-SCNC: 106 MMOL/L (ref 94–109)
CO2 SERPL-SCNC: 32 MMOL/L (ref 20–32)
CREAT SERPL-MCNC: 0.75 MG/DL (ref 0.66–1.25)
GFR SERPL CREATININE-BSD FRML MDRD: >90 ML/MIN/1.73M2
GLUCOSE BLD-MCNC: 95 MG/DL (ref 70–99)
HGB BLD-MCNC: 10.2 G/DL (ref 13.3–17.7)
HGB BLD-MCNC: 10.9 G/DL (ref 13.3–17.7)
INR PPP: 3.66 (ref 0.85–1.15)
MAGNESIUM SERPL-MCNC: 2 MG/DL (ref 1.6–2.3)
PHOSPHATE SERPL-MCNC: 2.9 MG/DL (ref 2.5–4.5)
POTASSIUM BLD-SCNC: 4 MMOL/L (ref 3.4–5.3)
SODIUM SERPL-SCNC: 140 MMOL/L (ref 133–144)

## 2022-01-19 PROCEDURE — 84100 ASSAY OF PHOSPHORUS: CPT | Performed by: PHYSICIAN ASSISTANT

## 2022-01-19 PROCEDURE — 83735 ASSAY OF MAGNESIUM: CPT | Performed by: PHYSICIAN ASSISTANT

## 2022-01-19 PROCEDURE — G0378 HOSPITAL OBSERVATION PER HR: HCPCS

## 2022-01-19 PROCEDURE — 85018 HEMOGLOBIN: CPT | Performed by: PHYSICIAN ASSISTANT

## 2022-01-19 PROCEDURE — 82374 ASSAY BLOOD CARBON DIOXIDE: CPT | Performed by: PHYSICIAN ASSISTANT

## 2022-01-19 PROCEDURE — 250N000013 HC RX MED GY IP 250 OP 250 PS 637: Performed by: PHYSICIAN ASSISTANT

## 2022-01-19 PROCEDURE — 36415 COLL VENOUS BLD VENIPUNCTURE: CPT | Performed by: PHYSICIAN ASSISTANT

## 2022-01-19 PROCEDURE — 72100 X-RAY EXAM L-S SPINE 2/3 VWS: CPT

## 2022-01-19 PROCEDURE — 85610 PROTHROMBIN TIME: CPT | Performed by: PHYSICIAN ASSISTANT

## 2022-01-19 PROCEDURE — 99217 PR OBSERVATION CARE DISCHARGE: CPT | Performed by: PHYSICIAN ASSISTANT

## 2022-01-19 RX ORDER — AMOXICILLIN 250 MG
1 CAPSULE ORAL 2 TIMES DAILY PRN
COMMUNITY
Start: 2022-01-19 | End: 2022-12-12

## 2022-01-19 RX ORDER — METHOCARBAMOL 500 MG/1
500 TABLET, FILM COATED ORAL 4 TIMES DAILY
Qty: 20 TABLET | Refills: 0 | Status: SHIPPED | OUTPATIENT
Start: 2022-01-19 | End: 2022-09-02

## 2022-01-19 RX ORDER — OXYCODONE HYDROCHLORIDE 5 MG/1
5 TABLET ORAL EVERY 6 HOURS PRN
Qty: 12 TABLET | Refills: 0 | Status: SHIPPED | OUTPATIENT
Start: 2022-01-19 | End: 2022-09-02

## 2022-01-19 RX ORDER — LIDOCAINE 4 G/G
1 PATCH TOPICAL EVERY 24 HOURS
COMMUNITY
Start: 2022-01-19 | End: 2022-09-02

## 2022-01-19 RX ADMIN — METHOCARBAMOL 500 MG: 500 TABLET ORAL at 16:07

## 2022-01-19 RX ADMIN — ACETAMINOPHEN 975 MG: 325 TABLET, FILM COATED ORAL at 05:57

## 2022-01-19 RX ADMIN — METHOCARBAMOL 500 MG: 500 TABLET ORAL at 08:24

## 2022-01-19 RX ADMIN — METHOCARBAMOL 500 MG: 500 TABLET ORAL at 12:28

## 2022-01-19 RX ADMIN — ACETAMINOPHEN 975 MG: 325 TABLET, FILM COATED ORAL at 16:07

## 2022-01-19 NOTE — PROGRESS NOTES
"[facility]   General Surgery Progress Note           Assessment and Plan:   Assessment:   Trauma admission s/p fall on 1/12/22  Acute traumatic injuries: fracture of posterior R ribs #11-12 and R lateral L1 and L2 transverse processes. Hematoma of L flank.  Anticoagulated on warfarin for mechanical aortic valve  COVID POSITIVE, vaccinated, asymptomatic  Multiple comorbidities      Plan:   Follow Hg and INR closely with his mechanical valve, minimal reversal recommended  Pain control  SW following  Spine surgery consulted         Interval History:   Appears comfortable in bed. Primary pain is at R posterior rib cage, well-controlled with medications. No new complaints. Denies SOB. Up in room briefly with walker.  Tolerating diet. Last BM was 2 days ago.         Physical Exam:   Blood pressure 126/75, pulse 86, temperature 98.1  F (36.7  C), temperature source Oral, resp. rate 16, height 1.88 m (6' 2\"), weight 139 kg (306 lb 6.4 oz), SpO2 92 %.    I/O last 3 completed shifts:  In: -   Out: 100 [Urine:100]    Chest: Normal resp effort. Tender at R inferior/osterior rib cage  Abdomen: Soft, non-tender. Large ecchymosis with moderate induration at left flank/buttock/thigh             Data:   Blood culture:  Results for orders placed or performed during the hospital encounter of 01/22/21   Blood culture    Specimen: Blood    Left Arm   Result Value Ref Range    Specimen Description Blood Left Arm     Culture Micro No growth    Blood culture    Specimen: Blood    Right Arm   Result Value Ref Range    Specimen Description Blood Right Arm     Culture Micro No growth    Results for orders placed or performed during the hospital encounter of 01/09/21   Blood culture    Specimen: Blood    Left Hand   Result Value Ref Range    Specimen Description Blood Left Hand     Culture Micro No growth    Blood culture    Specimen: Blood    Left Arm   Result Value Ref Range    Specimen Description Blood Left Arm     Culture " Micro No growth    Results for orders placed or performed during the hospital encounter of 03/06/17   Blood culture    Specimen: Blood   Result Value Ref Range    Specimen Description Blood Left Arm     Special Requests Aerobic and anaerobic bottles received     Culture Micro No growth     Micro Report Status FINAL 03/12/2017    Blood culture    Specimen: Arm, Left; Blood   Result Value Ref Range    Specimen Description Blood Left Arm     Special Requests Aerobic and anaerobic bottles received     Culture Micro No growth     Micro Report Status FINAL 03/12/2017      *Note: Due to a large number of results and/or encounters for the requested time period, some results have not been displayed. A complete set of results can be found in Results Review.      Urine culture:  Results for orders placed or performed during the hospital encounter of 01/18/17   Urine Culture Aerobic Bacterial    Specimen: Urine   Result Value Ref Range    Specimen Description Midstream Urine     Special Requests Specimen received in preservative     Culture Micro >100,000 colonies/mL Escherichia coli (A)     Micro Report Status FINAL 01/19/2017     Organism: >100,000 colonies/mL Escherichia coli        Susceptibility    >100,000 colonies/ml escherichia coli (cristobal) -  (no method available)     Ampicillin >=32 Resistant  ug/mL     Cefazolin Value in next row  ug/mL      <=4 SusceptibleCefazolin CRISTOBAL breakpoints are for the treatment of uncomplicated urinary tract infections.  For the treatment of systemic infections, please contact the laboratory for additional testing.     Cefoxitin Value in next row  ug/mL      <=4 SusceptibleCefazolin CRISTOBAL breakpoints are for the treatment of uncomplicated urinary tract infections.  For the treatment of systemic infections, please contact the laboratory for additional testing.     Ceftazidime Value in next row  ug/mL      <=4 SusceptibleCefazolin CRISTOBAL breakpoints are for the treatment of uncomplicated urinary  tract infections.  For the treatment of systemic infections, please contact the laboratory for additional testing.     Ceftriaxone Value in next row  ug/mL      <=4 SusceptibleCefazolin JAMISON breakpoints are for the treatment of uncomplicated urinary tract infections.  For the treatment of systemic infections, please contact the laboratory for additional testing.     Ciprofloxacin Value in next row  ug/mL      <=4 SusceptibleCefazolin JAMISON breakpoints are for the treatment of uncomplicated urinary tract infections.  For the treatment of systemic infections, please contact the laboratory for additional testing.     Gentamicin Value in next row  ug/mL      <=4 SusceptibleCefazolin JAMISON breakpoints are for the treatment of uncomplicated urinary tract infections.  For the treatment of systemic infections, please contact the laboratory for additional testing.     Levofloxacin Value in next row  ug/mL      <=4 SusceptibleCefazolin JAMISON breakpoints are for the treatment of uncomplicated urinary tract infections.  For the treatment of systemic infections, please contact the laboratory for additional testing.     Nitrofurantoin Value in next row  ug/mL      <=4 SusceptibleCefazolin JAMISON breakpoints are for the treatment of uncomplicated urinary tract infections.  For the treatment of systemic infections, please contact the laboratory for additional testing.     Tobramycin Value in next row  ug/mL      <=4 SusceptibleCefazolin JAMISON breakpoints are for the treatment of uncomplicated urinary tract infections.  For the treatment of systemic infections, please contact the laboratory for additional testing.     Trimethoprim/Sulfamethoxazole Value in next row  ug/mL      <=4 SusceptibleCefazolin JAMISON breakpoints are for the treatment of uncomplicated urinary tract infections.  For the treatment of systemic infections, please contact the laboratory for additional testing.     Ampicillin/Sulbactam Value in next row  ug/mL      <=4  SusceptibleCefazolin JAMISON breakpoints are for the treatment of uncomplicated urinary tract infections.  For the treatment of systemic infections, please contact the laboratory for additional testing.     Piperacillin/Tazo Value in next row  ug/mL      <=4 SusceptibleCefazolin JAMISON breakpoints are for the treatment of uncomplicated urinary tract infections.  For the treatment of systemic infections, please contact the laboratory for additional testing.     Cefepime Value in next row  ug/mL      <=4 SusceptibleCefazolin JAMISON breakpoints are for the treatment of uncomplicated urinary tract infections.  For the treatment of systemic infections, please contact the laboratory for additional testing.     *Note: Due to a large number of results and/or encounters for the requested time period, some results have not been displayed. A complete set of results can be found in Results Review.     Recent Labs   Lab 01/19/22  0621 01/18/22  2216 01/18/22  1433   HGB 10.2* 10.5* 9.6*     Recent Labs   Lab 01/19/22  0622 01/18/22  1020   INR 3.66* 3.82*       Barbara Ulloa PA-C     Hemoglobin stable, agree with plan.  Libby Souza MD

## 2022-01-19 NOTE — CONSULTS
[facility]    Neurosurgery Consultation     Date of Admission:  1/18/2022  Date of Consult (When I saw the patient): 01/19/22    Assessment & Plan   Todd S Aschoff is a 66 year old male who was admitted on 1/18/2022. Todd S Aschoff is a 66 year old male history of a fib, thoracic aorta dissection s/p aortic valve replacement on Coumadin presented yesterday with history of right low back pain after a mechanical fall 1/12/22 with radiographic evidence as stated below.     CT CHEST/ABDOMEN/PELVIS WITH CONTRAST  1/18/2022 1:14 PM                                                            IMPRESSION:  1.  Acute fractures at the right posterior 11th and 12th ribs. Acute  fractures of the right lateral L1 and L2 transverse processes.  2.  Prominent subcutaneous hematoma at the left flank and extending to  the left posterior pelvic level. Tiny opacities at the right posterior  back may represent small subcutaneous contusion.  3.  No other acute traumatic abnormality is seen.  4.  Stable postoperative change of the ascending thoracic aorta and  aortic valve.     RAYO CHIN MD     Clinical history, imaging and plans reviewed myself as well as with Dr. Norris. Reviewed with patient diagnoses acute fractures right lateral L1 and L2 transverse processes and typical healing timeframe. Patient is neuro intact on exam, no focal deficit. Recommend lumbar XR. If stable, cleared form NSGY standpoint to discharge home. No further NSGY follow up needed as long as XR stable. Plans reviewed with Dr. Norris, hospitalist Nicko Vaughan- all in agreement.     I have discussed the following assessment and plan with Dr. Norris who is in agreement with the initial plan and will follow up with further consultation recommendations.    Aurelia TUCKER [facility] Neurosurgery  [address]  [address] [place] [id number]    Tel [phone]  Pager [phone]    Code Status     Full Code    Reason for Consult   Reason for consult: I was asked by Dr. Andrew to evaluate this patient for low back pain, L1 and L2 transverse process fractures .    Primary Care Physician   Herve Morgan    Chief Complaint   Low back pain    History is obtained from the patient    History of Present Illness   Todd S Aschoff is a 66 year old male history of a fib, thoracic aorta dissection s/p aortic valve replacement on Coumadin presented yesterday with history of right low back pain after a mechanical fall 1/12/22. Nicko is historian and reliable. Nicko admits he has had low back pain since his fall on 1/12/22. Pain located in mid-low back with no radiation. He admits he has had long term paresthesias from nerve injury from hip surgery. Otherwise, denies new paresthesias, weakness, jayne/bladder changes, saddle anesthesia, gait changes. He admits back pain has significantly improved with medications while in hospital.     CT CHEST/ABDOMEN/PELVIS WITH CONTRAST  1/18/2022 1:14 PM                                                            IMPRESSION:  1.  Acute fractures at the right posterior 11th and 12th ribs. Acute  fractures of the right lateral L1 and L2 transverse processes.  2.  Prominent subcutaneous hematoma at the left flank and extending to  the left posterior pelvic level. Tiny opacities at the right posterior  back may represent small subcutaneous contusion.  3.  No other acute traumatic abnormality is seen.  4.  Stable postoperative change of the ascending thoracic aorta and  aortic valve.     RAYO CHIN MD     Past Medical History   I have reviewed this patient's medical history and updated it with pertinent information if needed.   Past Medical History:   Diagnosis Date     Advanced directives, counseling/discussion 1/6/2012    Discussed Advance Directive planning with patient; information given to patient to review.     Alcohol dependence (H)      Alcohol dependence in remission (H) 8/2/2018      Allergy, unspecified not elsewhere classified     seasonal     Anemia, unspecified type 1/22/2021     Aortic valve prosthesis present 2/8/2021     Atrial fibrillation (H)      Benign prostatic hyperplasia 2/8/2021     Bilateral thoracic back pain 11/16/2016     BPH (benign prostatic hypertrophy)      Chronic atrial fibrillation (H) 8/7/2002     Chronic infection     low back wound incision not healing      Chronic low back pain 8/19/2011     Chronic pain     lower back and right leg and left leg     Depressive disorder 6/9/2010    Depression  NOS     Dissection of aorta, thoracic (H) 1992    St Jose F aotic valve + arch graft 1992     Elevated prostate specific antigen (PSA) 1/22/2020     Family history of prostate cancer 1/22/2020     Generalized muscle weakness 1/22/2021     Headache(784.0)      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 Provider to review     Leg wound, left 1/9/2021     Long term current use of anticoagulant therapy 8/7/2002    LW Modifier:  Coumadin, since mechanical aortic valve LW Onset:  1992 ; Anticoagulant Rx Long Term Problem list name updated by automated process. Provider to review     Low back pain      Lumbar radiculopathy 4/3/2013     Lumbar surgical wound fluid collection 5/23/2013     Major depression      Memory difficulties 1/16/2015     Mixed hyperlipidemia      Morbid obesity (H) 4/20/2010    LW Modifier:  BMI 41.3 (Apr 2010) ; Obesity Morbid     Numbness and tingling     right leg post surg rightt hip/ also left leg since surg     OA (osteoarthritis)     hips     OAB (overactive bladder) 5/11/2015     Obesity, unspecified      Persons encountering health services in other specified circumstances 4/3/2012    Formatting of this  note might be different from the original. EMERGENCY CARE PLAN Presenting Problem Signs and Symptoms Treatment Plan   Questions or conerns during clinic hours    I will call the clinic directly    Questions or conerns outside clinic hours    I will call the 24 hour nurse line at [phone]   Patient needs to schedule an appointment    I will call the 24 hour scheduling team at     Polypharmacy 1/22/2021     Prostate infection      Radiculitis 4/20/2010    LW Modifier:  Right foot, s/p R AMANDA LW Onset:  2002 ; Neuralgia     Recurrent major depressive episodes, in full remission (H) 10/30/2012     Renal insufficiency 1/22/2021     Rotator cuff tear 1/26/2015    Rotator cuff tear, right     S/P lumbar fusion 4/5/2018     S/P [employer] 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  Past Surgical History:   Procedure Laterality Date     AORTIC VALVE REPLACEMENT  1992    St. Jose F's valve     APPLY WOUND VAC Left 1/26/2021    Procedure: Placement of negative pressure wound therapy 2,400 squared centimeters  ;  Surgeon: Lazaro Plascencia MD;  Location: [facility] OR     CATARACT IOL, RT/LT      rt eye only     CHOLECYSTECTOMY, LAPOROSCOPIC  8/10     CLOSE SECONDARY WOUND LOWER EXTREMITY Left 2/3/2021    Procedure:  Split Thickness Skin Graft to Leg of 18 square centimeters  Intermediate Closure of Leg of 8cm   ;  Surgeon: Lazaro Plascencia MD;  Location: [facility] OR     COLONOSCOPY N/A 1/15/2015    Procedure: COLONOSCOPY;  Surgeon: Johnson Kothari MD;   Location: [facility] GI     DECOMPRESSION LUMBAR ONE LEVEL  4/3/2013    Procedure: DECOMPRESSION LUMBAR ONE LEVEL;  Open Decompression L3-4 bilateral;  Surgeon: Khalif Coffey MD;  Location: [facility] OR     DECOMPRESSION, FUSION CERVICAL ANTERIOR ONE LEVEL, COMBINED  3/23/2012    Procedure:COMBINED DECOMPRESSION, FUSION CERVICAL ANTERIOR ONE LEVEL; Anterior Cervical Decompression and Fusion C4-6; Surgeon:KHALIF COFFEY; Location:[facility] OR     ELBOW SURGERY       EXPLORE SPINE, REMOVE HARDWARE, COMBINED  5/23/2013    Procedure: COMBINED EXPLORE SPINE, REMOVE HARDWARE;  Exploration Lumbar Wound for fluid collection;  Surgeon: Khalif Coffey MD;  Location: [facility] OR     FUSION CERVICAL ANTERIOR TWO LEVELS  3/26/2012    Procedure:FUSION CERVICAL ANTERIOR TWO LEVELS; Anterior Cervical Fusion C4-6, Anterior Cervical  Hematoma Evacuation; Surgeon:KHALIF COFFEY; Location:[facility] OR     IR LUMBAR EPIDURAL STEROID INJECTION  3/28/2003     IRRIGATION AND DEBRIDEMENT LOWER EXTREMITY, COMBINED Left 1/10/2021    Procedure: 1.  Irrigation and excisional debridement to muscle left distal medial calf chronic wounds total area measuring 9 cm x 4 cm 2.  Irrigation and excisional debridement to fascia left medial calf chronic hematoma measuring 29 x 6.7 x 4.2 cm 3.  Primary complex wound closure left medial distal calf 9 cm in length;  Surgeon: Rod Kemp MD;  Location: [facility] OR     IRRIGATION AND DEBRIDEMENT LOWER EXTREMITY, COMBINED Left 1/26/2021    Procedure: Evacuation of hematoma debridement of skin, subcutaneous tissue and muscle 2,400 squared centimeters, placement of negative pressure wound therapy 2,400 squared centimeters;  Surgeon: Lazaro Plascencia MD;  Location: [facility] OR     IRRIGATION AND DEBRIDEMENT SPINE, CLOSE WOUND, COMBINED  3/26/2012    Procedure:COMBINED IRRIGATION AND DEBRIDEMENT SPINE, CLOSE WOUND; Surgeon:KHALIF COFFEY; Location:[facility] OR     OTHER SURGICAL HISTORY      [place] UNLISTED ORBIT     OTHER SURGICAL  HISTORY      [place] UNLISTED SPINE     OTHER SURGICAL HISTORY      [place] UNLISTED NECK/THORAX     OTHER SURGICAL HISTORY      (IA) [place] TOTAL HIP ARTHROPLASTY     OTHER SURGICAL HISTORY      (IA) [place] NEE SCOPE MED W LAT MENISCECT WWO DEBRIBE/SHAVE ANY CO     removal of cyst of back   2.5week     TONSILLECTOMY       wisdom teeth[       [facility] NONSPECIFIC PROCEDURE  1992    repair of TAA with graft     [facility] TOTAL HIP ARTHROPLASTY  2010    Left AMANDA     [facility] TOTAL HIP ARTHROPLASTY  2002    R hip replacement comp's by nerve injury with pain down into R leg, nadya below knee       Prior to Admission Medications   Prior to Admission Medications   Prescriptions Last Dose Informant Patient Reported? Taking?   acetaminophen (TYLENOL) 500 MG tablet 1/17/2022 at Unknown time Care Giver Yes Yes   Sig: Take 500-1,000 mg by mouth 2 times daily    aspirin (ASA) 81 MG chewable tablet 1/17/2022 at Unknown time Care Giver Yes Yes   Sig: Take 81 mg by mouth daily    cetirizine (ZYRTEC) 10 MG tablet 1/17/2022 at Unknown time Care Giver Yes Yes   Sig: Take 10 mg by mouth every morning    cyclobenzaprine (FLEXERIL) 10 MG tablet prn  No Yes   Sig: TAKE ONE TABLET BY MOUTH THREE TIMES DAILY AS NEEDED FOR MUSCLE SPASM.   diazepam (VALIUM) 5 MG tablet 1/17/2022 at Unknown time  No Yes   Sig: Take 1 tablet (5 mg) by mouth nightly as needed for anxiety   donepezil (ARICEPT) 10 MG tablet 1/17/2022 at Unknown time  No Yes   Sig: Take 1 tablet (10 mg) by mouth At Bedtime   enoxaparin ANTICOAGULANT (LOVENOX) 100 MG/ML syringe Past Month  No Yes   Sig: Inject 1 mL (100 mg) Subcutaneous every 12 hours   folic acid (FOLVITE) 1 MG tablet 1/17/2022 at Unknown time  No Yes   Sig: Take 5 tablets  by mouth daily   guanFACINE (TENEX) 1 MG tablet 1/17/2022 at Unknown time  No Yes   Sig: Take 1 tablet (1 mg) by mouth At Bedtime   levothyroxine (SYNTHROID/LEVOTHROID) 150 MCG tablet 1/18/2022 at Unknown time Care Giver No Yes   Sig: Take 1 tablet (150 mcg) by mouth daily    mirabegron (MYRBETRIQ) 50 MG 24 hr tablet 2022 at Unknown time Care Giver Yes Yes   Sig: Take 50 mg by mouth every evening   pregabalin (LYRICA) 100 MG capsule 2022 at am  No Yes   Sig: Take 1 capsule (100 mg) by mouth 3 times daily Do not take if sleepy/sedated.   propafenone (RYTHMOL) 150 MG TABS tablet 2022 at am  No Yes   Sig: Take 1 tablet (150 mg) by mouth every 8 hours   simvastatin (ZOCOR) 40 MG tablet 2022 at am  No Yes   Sig: Take 1 tablet (40 mg) by mouth At Bedtime. Need to update cholesterol level before additional refills.   venlafaxine (EFFEXOR-XR) 75 MG 24 hr capsule 2022 at Unknown time  No Yes   Sig: Take 3 capsules (225 mg) by mouth daily   warfarin ANTICOAGULANT (COUMADIN) 2.5 MG tablet 2022 at 5mg Care Giver Yes Yes   Sig: Take 5 mg by mouth daily 7.5mg=Wed and Sun, 5mg=ROW      Facility-Administered Medications: None     Allergies   Allergies   Allergen Reactions     Blood Transfusion Related (Informational Only) Other (See Comments)     Patient has a history of a clinically significant antibody against RBC antigens.  A delay in compatible RBCs may occur.       Gabapentin      Severe behavioral disturbances       Social History   I have reviewed this patient's social history and updated it with pertinent information if needed. Todd S Aschoff  reports that he has never smoked. He has never used smokeless tobacco. He reports that he does not drink alcohol and does not use drugs.    Family History   I have reviewed this patient's family history and updated it with pertinent information if needed.   Family History   Problem Relation Age of Onset     Cerebrovascular Disease Father          age 86, had M.I. ,diabetic also     Prostate Cancer Father      Diabetes Father      Cancer Mother          age 83 of dementia, also h/o lymphoma     Diabetes Mother      Hypertension Brother         2 brothers with hypertension & sleep apnea     Hypertension Sister          "Born ~1936     Sleep Apnea Brother          age 78, Alzheimers     No Known Problems Son      No Known Problems Daughter      No Known Problems Son      Family History Negative Brother         Had 5 brothers, three still alive as of 2019     Colon Cancer No family hx of        Review of Systems    ROS: 10 point ROS neg other than the symptoms noted above in the HPI.      Physical Exam   Temp: 98.1  F (36.7  C) Temp src: Oral BP: 126/75 Pulse: 86   Resp: 16 SpO2: 92 % O2 Device: None (Room air)    Vital Signs with Ranges  Temp:  [98  F (36.7  C)-98.3  F (36.8  C)] 98.1  F (36.7  C)  Pulse:  [75-88] 86  Resp:  [16-20] 16  BP: ()/(44-82) 126/75  SpO2:  [90 %-100 %] 92 %  306 lbs 6.4 oz     , Blood pressure 126/75, pulse 86, temperature 98.1  F (36.7  C), temperature source Oral, resp. rate 16, height 6' 2\" (1.88 m), weight 306 lb 6.4 oz (139 kg), SpO2 92 %.  306 lbs 6.4 oz    NEUROLOGICAL EXAMINATION:     Mental status:  Alert and Oriented , speech is fluent.  Cranial nerves:  II-XII intact.   Motor:  Strength is 5/5 throughout the upper and lower extremities  Shoulder Abduction:  Right:  5/5   Left:  5/5  Biceps:                      Right:  5/5   Left:  5/5  Triceps:                     Right:  5/5   Left:  5/5  Wrist Extensors:       Right:  5/5   Left:  5/5  Wrist Flexors:           Right:  5/5   Left:  5/5  interosseus :            Right:  5/5   Left:  5/5   Hip Flexor:                Right: 5/5  Left:  5/5  Hip Adductor:             Right:  5/5  Left:  5/5  Hip Abductor:             Right:  5/5  Left:  5/5  Gastroc Soleus:        Right:  5/5  Left:  5/5  Tib/Ant:                      Right:  5/5  Left:  5/5  EHL:                     Right:  5/5  Left:  5/5  Sensation:  Intact to light touch with exception of both ankles- left one with history of hematoma and right ankle history of nerve injury from prior hip surgery, no new paresthesias or sensation changes   Reflexes:   Negative Clonus.      Lumbar " examination reveals no tenderness of the spine or paraspinous muscles.      Data   All new lab and imaging data was personally reviewed by me.  CT:  CT CHEST/ABDOMEN/PELVIS WITH CONTRAST  1/18/2022 1:14 PM     CLINICAL HISTORY: Fall, right and left flank pain, large left flank  hematoma, back pain. Coumadin.     TECHNIQUE: CT scan of the chest, abdomen, and pelvis was performed  following injection of IV contrast. Multiplanar reformats were  obtained. Dose reduction techniques were used.   CONTRAST: 100 mL Isovue-[id number]     COMPARISON: CT chest, abdomen and pelvis 5/18/2021.     FINDINGS:   LUNGS AND PLEURA: No effusions. No pneumothorax. No acute airspace  disease.     MEDIASTINUM/AXILLAE: No acute thoracic aortic abnormality. Stable  configuration of ascending aortic endograft and aortic valve  postoperative changes. The proximal aspect of the thoracic aorta is  4.4 cm, stable. No acute mediastinal abnormality. No suspicious lymph  nodes.     CORONARY ARTERY CALCIFICATION: Severe.     HEPATOBILIARY: Cholecystectomy. No acute liver abnormality.     PANCREAS: Normal.     SPLEEN: Normal.     ADRENAL GLANDS: Normal.     KIDNEYS/BLADDER: Incidental left-sided parapelvic renal cysts without  specific follow-up recommended. No acute renal abnormality. No  hydronephrosis. Small cyst or angiomyolipoma at the posterior right  kidney appears stable.     BOWEL: No obstruction or inflammation. Normal appendix.     PELVIC ORGANS: No acute abnormality. Prostate is 5.4 cm. Streak  artifact obscures structures.     ADDITIONAL FINDINGS: Prominent subcutaneous lobulated hematoma with  surrounding edema along the left lateral posterior flank extending  posterior and lateral to the gluteal level. A locule of blood is 5.3  cm in this region, series 3 image 238. There are other locules noted.  Small subcutaneous contusion suggested along the right posterior lower  back, series 3 image 136.     MUSCULOSKELETAL: Acute fractures involving the  right posterior 11th  and 12th ribs. Acute fractures involving the right lateral L1 and L2  transverse processes. Diffuse spine degenerative changes. Right hip  arthroplasty. A few subacute healed rib fractures.  Sternotomy.                                                                      IMPRESSION:  1.  Acute fractures at the right posterior 11th and 12th ribs. Acute  fractures of the right lateral L1 and L2 transverse processes.  2.  Prominent subcutaneous hematoma at the left flank and extending to  the left posterior pelvic level. Tiny opacities at the right posterior  back may represent small subcutaneous contusion.  3.  No other acute traumatic abnormality is seen.  4.  Stable postoperative change of the ascending thoracic aorta and  aortic valve.     RAYO CHIN MD     CBC RESULTS:   Recent Labs   Lab Test 01/19/22  0621 01/18/22  1433 01/18/22  1020   WBC  --   --  8.3   RBC  --   --  3.25*   HGB 10.2*   < > 10.4*   HCT  --   --  31.8*   MCV  --   --  98   MCH  --   --  32.0   MCHC  --   --  32.7   RDW  --   --  13.8   PLT  --   --  274    < > = values in this interval not displayed.     Basic Metabolic Panel:  Lab Results   Component Value Date     01/19/2022     06/18/2021      Lab Results   Component Value Date    POTASSIUM 4.0 01/19/2022    POTASSIUM 4.1 06/18/2021     Lab Results   Component Value Date    CHLORIDE 106 01/19/2022    CHLORIDE 108 06/18/2021     Lab Results   Component Value Date    CATERINA 8.6 01/19/2022    CATERINA 9.0 06/18/2021     Lab Results   Component Value Date    CO2 32 01/19/2022    CO2 29 06/18/2021     Lab Results   Component Value Date    BUN 19 01/19/2022    BUN 18 06/18/2021     Lab Results   Component Value Date    CR 0.75 01/19/2022    CR 1.02 06/18/2021     Lab Results   Component Value Date    GLC 95 01/19/2022    GLC 90 06/18/2021     INR:  Lab Results   Component Value Date    INR 3.66 01/19/2022    INR 3.82 01/18/2022    INR 2.7 01/06/2022    INR 2.5  12/27/2021    INR 3.4 12/17/2021    INR 1.9 12/13/2021    INR 3.40 12/10/2021    INR 1.7 12/07/2021    INR 3.3 10/20/2021    INR 2.7 09/28/2021    INR 3.1 09/14/2021    INR 3.9 09/07/2021    INR 3.4 08/31/2021    INR 2.48 08/27/2021    INR 3.3 08/20/2021    INR 3.0 08/13/2021    INR 2.3 08/06/2021    INR 2.3 07/30/2021    INR 3.0 07/16/2021    INR 2.8 07/09/2021    INR 3.1 07/06/2021    INR 3.6 07/02/2021    INR 2.9 06/28/2021    INR 5.0 06/25/2021    INR 3.1 06/23/2021    INR 2.1 06/21/2021    INR 2.50 06/20/2021    INR 3.84 06/19/2021    INR 3.88 06/18/2021    INR 4.5 06/18/2021

## 2022-01-19 NOTE — CONSULTS
Care Management Discharge Note    Discharge Date: TBD     Additional Information:  CM consulted for discharge planning, per provider pt does not have any discharge needs at this time.    Pt identified as a Service Bundle #4. Please consult CM/SW  if discharge needs should arise.    Rosa Ca RN BSN   Inpatient Care Coordination  [facility]   Phone [phone]

## 2022-01-19 NOTE — PLAN OF CARE
"PRIMARY DIAGNOSIS: Hematoma of flank  OUTPATIENT/OBSERVATION GOALS TO BE MET BEFORE DISCHARGE:  1. ADLs back to baseline: Yes, pt is independent in room and uses a walker for assistance.    2. Activity and level of assistance: Ambulating independently.    3. Pain status: Improved-controlled with oral pain medications.     4. Return to near baseline physical activity: Yes     Discharge Planner Nurse   Safe discharge environment identified: Yes  Barriers to discharge: Yes     /52 (BP Location: Right arm)   Pulse 75   Temp 98.3  F (36.8  C) (Oral)   Resp 18   Ht 1.88 m (6' 2\")   Wt 139 kg (306 lb 6.4 oz)   SpO2 94%   BMI 39.34 kg/m       Pt admitted after falling and hitting his back, broken ribs (11,12), and hematoma of flank. Back is bruised, abdominal binder is placed. x2 PIV SL. Regular diet, tolerating well. Hgb is 10.5. Will encourage IS use, maintain airborne precautions for covid-19.        Entered by: Nae Ross 01/19/2022 4:44 AM     Please review provider order for any additional goals.   Nurse to notify provider when observation goals have been met and patient is ready for discharge.  "

## 2022-01-19 NOTE — DISCHARGE SUMMARY
[facility]    Discharge Summary  Hospitalist    Date of Admission:  1/18/2022  Date of Discharge:  1/19/2022  Provider:  Patti Lyn PA-C  Date of Service (when I last saw the patient): 01/19/22    Discharge Diagnoses   Right posterior 11th and 12th rib fractures s/p mechanical fall   Acute fractures of right lateral L1 and L2 transverse processes   Hematoma of left flank extending to the left posterior pelvic level   COVID infection     Other medical issues:  Past Medical History:   Diagnosis Date     Advanced directives, counseling/discussion 1/6/2012    Discussed Advance Directive planning with patient; information given to patient to review.     Alcohol dependence (H)      Alcohol dependence in remission (H) 8/2/2018     Allergy, unspecified not elsewhere classified     seasonal     Anemia, unspecified type 1/22/2021     Aortic valve prosthesis present 2/8/2021     Atrial fibrillation (H)      Benign prostatic hyperplasia 2/8/2021     Bilateral thoracic back pain 11/16/2016     BPH (benign prostatic hypertrophy)      Chronic atrial fibrillation (H) 8/7/2002     Chronic infection     low back wound incision not healing      Chronic low back pain 8/19/2011     Chronic pain     lower back and right leg and left leg     Depressive disorder 6/9/2010    Depression  NOS     Dissection of aorta, thoracic (H) 1992    St Jose F aotic valve + arch graft 1992     Elevated prostate specific antigen (PSA) 1/22/2020     Family history of prostate cancer 1/22/2020     Generalized muscle weakness 1/22/2021     Headache(784.0)      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 Provider to review     Leg wound, left 1/9/2021     Long term current use of  anticoagulant therapy 8/7/2002    LW Modifier:  Coumadin, since mechanical aortic valve LW Onset:  1992 ; Anticoagulant Rx Long Term Problem list name updated by automated process. Provider to review     Low back pain      Lumbar radiculopathy 4/3/2013     Lumbar surgical wound fluid collection 5/23/2013     Major depression      Memory difficulties 1/16/2015     Mixed hyperlipidemia      Morbid obesity (H) 4/20/2010    LW Modifier:  BMI 41.3 (Apr 2010) ; Obesity Morbid     Numbness and tingling     right leg post surg rightt hip/ also left leg since surg     OA (osteoarthritis)     hips     OAB (overactive bladder) 5/11/2015     Obesity, unspecified      Persons encountering health services in other specified circumstances 4/3/2012    Formatting of this note might be different from the original. EMERGENCY CARE PLAN Presenting Problem Signs and Symptoms Treatment Plan   Questions or conerns during clinic hours    I will call the clinic directly    Questions or conerns outside clinic hours    I will call the 24 hour nurse line at [phone]   Patient needs to schedule an appointment    I will call the [facility] hour scheduling team [facility]     Prostate infection      Radiculitis 4/20/2010    LW Modifier:  Right foot, s/p R AMANDA LW Onset:  2002 ; Neuralgia     Recurrent major depressive episodes, in full remission (H) 10/30/2012     Renal insufficiency 1/22/2021     Rotator cuff tear 1/26/2015    Rotator cuff tear, right     S/P lumbar fusion 4/5/2018     S/P [employer] 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 Please see the admission history and physical for full details.    Hospital Course   Todd S Aschoff was admitted on 1/18/2022.  The following problems were addressed during his hospitalization:    #Right posterior 11th and 12th rib fractures s/p mechanical fall  -Mechanical fall on 1/12 off of bottom step landing on low back  -Pain with movement but not deep inspiration  -Scheduled Tylenol and Robaxin, OTC Lidocaine patches, and PRN Oxycodone for severe pain  -use OTC Senokot for bowel regimen while taking narcotics    -Ice frequently  -General surgery consult for trauma eval     #Acute fractures of the right lateral L1 and L2 transverse processes  -S/p mechanical fall as above  -minimal pain associated with these   -Spine surgery consulted on admission, recommended x-ray of lumbar spine which was negative for acute deficits/fractures, no further recommendations or need for neurosurgery follow up   -Pain plan as above     #Hematoma of left flank extending to the left posterior pelvic level  -Significant bruising with central induration of left flank s/p mechanical fall  -Vitally stable without lightheadedness or dizziness today  -Hemoglobin remained stable throughout stay and improved to 10.9 prior to discharge   -Anticoagulated with warfarin (goal 2.5-3.5) and INR of 3.82  -admitting provider discussed with general surgery and patient received vitamin K 1 mg on 1/18  -warfarin held on admission and should continue to be held until INR recheck on 1/20 (consider use of Lovenox bridging pending INR and Hgb recheck)  -serial Hgb stable  "and 10.9 prior to discharge   -recommend frequent icing and use of abdominal binder  -hold ASA until seen in f/u with PCP     #COVID infection: noted to be positive on admission with patient being fully vaccinated and with COVID symptoms about 3 weeks prior to admission with no prior official testing due to whole family having it.  - afebrile and no hypoxia while admitted  - no indication for Decadron or Remdesivir during stay     #Thoracic aortic dissection requiring repair and [employer] F mechanical aortic valve replacement  -Anticoagulated on warfarin with goal INR of 2.5-3.5  -Holding warfarin for now, initial INR of 3.82 >> 3.66, instructed patient to recheck on 1/20 (patient does have Lovenox injections at home to bridge if needed since important he keeps INR therapeutic with valve replacement)     #Atrial fibrillation  -continue Propafenone    Pending Results   Unresulted Labs Ordered in the Past 30 Days of this Admission     Date and Time Order Name Status Description    1/18/2022  1:23 PM Prepare red blood cells (unit) Preliminary     1/18/2022  1:23 PM Prepare red blood cells (unit) Preliminary         Code Status   Full Code       Primary Care Physician   Herve Morgan    Exam:    /70 (BP Location: Left arm)   Pulse 78   Temp 98.2  F (36.8  C) (Oral)   Resp 18   Ht 1.88 m (6' 2\")   Wt 139 kg (306 lb 6.4 oz)   SpO2 92%   BMI 39.34 kg/m    GENERAL:  Comfortable.  PSYCH: pleasant, oriented, NAD.  HEENT:  PERRLA. Normal conjunctiva, normal hearing, nasal mucosa and oropharynx are normal.  NECK:  Supple, no neck vein distention  HEART:  Normal S1, S2 with loud click, no pericardial rub, gallops or S3 or S4.  LUNGS:  Clear to auscultation, normal respiratory effort. No wheezing, rales or ronchi.  ABDOMEN:  Large area of hematoma present over left flank spreading into left back and left thigh.   EXTREMITIES:  No pedal edema, +2 pulses bilateral and equal.  SKIN:  Dry to touch, No rash, wound or " ulcerations.  NEUROLOGIC:  CN 2-12 grossly intact, sensation is intact with no focal deficits. Moving all extremities equally.     Discharge Disposition   Discharged to home    Consultations This Hospital Stay   SURGERY GENERAL IP CONSULT  SPINE SURGERY ADULT IP CONSULT  CARE MANAGEMENT / SOCIAL WORK IP CONSULT    Time Spent on this Encounter   Lynda DUARTE PA-C, personally saw the patient today and spent greater than 30 minutes discharging this patient.    Discharge Orders      INR     Hemoglobin     Care Coordination Referral      Reason for your hospital stay    You were admitted due to concerns for right flank pain and increased bruising of left flank after fall on 1/12. Your workup included basic labs and imaging showing right posterior 11th and 12th rib fractures, right lateral L1 and L2 transverse process fractures, and left flank hematoma. You were seen by general surgery for trauma evaluation with recommendation for vitamin K to slightly reverse you supratherapeutic INR (INR went from 3.8->3.6). You were seen by neurosurgery for your transverse process fractures and had a x-ray of your lumbar spine completed which was negative for any additional fractures. There is no need for follow up with neurosurgery or general surgery upon discharge. Your pain was controlled with scheduled Tylenol, Lidocaine patches, Robaxin (muscle relaxer), and low dose Oxycodone. Your hemoglobin remained stable throughout your stay. You are safe to discharge home with holding your Warfarin and Aspirin as well close follow up of your INR with recommendations by your primary care about when to resume your Warfarin and if you need to bridge with Lovenox.     Follow-up and recommended labs and tests     Follow up with primary care provider, Herve Morgan, within 7 days for hospital follow- up.  The following labs/tests are recommended: INR and Hgb on 1/20, discuss results with primary care to know what to do about Warfarin and  if Lovenox needed.     Activity    Your activity upon discharge: activity as tolerated, but would limits any strenuous activity until hematoma improves     Discharge Instructions    1. Hold aspirin until follow up with primary care provider  2. Hold Coumadin on 1/19, recheck INR on 1/20 and discuss with primary when to recheck and when to return Warfarin with close follow up to make sure INR does not drop below 2.5 and discuss if you need to bridge with Lovenox at home  3. Do not take Flexeril and Robaxin together (you were using Robaxin while admitted with adequate pain control, would recommend continuing as needed up to 4 times per day)     When to contact your care team    Call your primary doctor or return to ED if you have any of the following: increased pain, worsening firmness of hematoma, or become short of breath/lightheaded, dizzy.     Brief Discharge Instructions    Discharge Instructions for COVID-19 Patients  You have-or may have-COVID-19. Please follow the instructions listed below.   If you have a weakened immune system, discuss with your doctor any other actions you need to take.  How can I protect others?  If you have symptoms (fever, cough, body aches or trouble breathing):    Stay home and away from others (self-isolate) until:  ? Your other symptoms have resolved (gotten better). And?  ? You've had no fever-and no medicine that reduces fever-for 1 full day (24 hours). And?  ? At least 10 days have passed since your symptoms started. (You may need to wait 20 days. Follow the advice of your care team.)  If you don't show symptoms, but testing showed that you have COVID-19:    Stay home and away from others (self-isolate) until at least 10 days have passed since the date of your first positive COVID-19 test.  During this time    Stay in your own room, even for meals. Use your own bathroom if you can.    Stay away from others in your home. No hugging, kissing or shaking hands. No visitors.    Don't  "go to work, school or anywhere else.    Clean \"high touch\" surfaces often (doorknobs, counters, handles). Use household cleaning spray or wipes.    You'll find a full list of  on the EPA website: www.epa.gov/pesticide-registration/list-n-disinfectants-use-against-sars-cov-2.    Cover your mouth and nose with a mask or other face covering to avoid spreading germs.    Wash your hands and face often. Use soap and water.    Caregivers in these groups are at risk for severe illness due to COVID-19:  ? People 65 years and older  ? People who live in a nursing home or long-term care facility  ? People with chronic disease (lung, heart, cancer, diabetes, kidney, liver, immunologic)  ? People who have a weakened immune system, including those who:    Are in cancer treatment    Take medicine that weakens the immune system, such as corticosteroids    Had a bone marrow or organ transplant    Have an immune deficiency    Have poorly controlled HIV or AIDS    Are obese (body mass index of 40 or higher)    Smoke regularly    Caregivers should wear gloves while washing dishes, handling laundry and cleaning bedrooms and bathrooms.    Use caution when washing and drying laundry: Don't shake dirty laundry and use the warmest water setting that you can.    For more tips on managing your health at home, go to www.cdc.gov/coronavirus/2019-ncov/downloads/10Things.pdf.  How can I take care of myself at home?  1. Get lots of rest. Drink extra fluids (unless a doctor has told you not to).  2. Take Tylenol (acetaminophen) for fever or pain. If you have liver or kidney problems, ask your family doctor if it's okay to take Tylenol.   Adults can take either:   ? 650 mg (two 325 mg pills) every 4 to 6 hours, or?  ? 1,000 mg (two 500 mg pills) every 8 hours as needed.  ? Note: Don't take more than 3,000 mg in one day. Acetaminophen is found in many medicines (both prescribed and over-the-counter medicines). Read all labels to be sure you " don't take too much.   For children, check the Tylenol bottle for the right dose. The dose is based on the child's age or weight.  3. If you have other health problems (like cancer, heart failure, an organ transplant or severe kidney disease): Call your specialty clinic if you don't feel better in the next 2 days.  4. Know when to call 911. Emergency warning signs include:  ? Trouble breathing or shortness of breath  ? Pain or pressure in the chest that doesn't go away  ? Feeling confused like you haven't felt before, or not being able to wake up  ? Bluish-colored lips or face  5. Your doctor may have prescribed a blood thinner medicine. Follow their instructions.  Where can I get more information?    [facility] - About COVID-19:   [email or web address]    CDC - What to Do If You're Sick: www.cdc.gov/coronavirus/2019-ncov/about/steps-when-sick.html    CDC - Ending Home Isolation: www.cdc.gov/coronavirus/2019-ncov/hcp/disposition-in-home-patients.html    CDC - Caring for Someone: www.cdc.gov/coronavirus/2019-ncov/if-you-are-sick/care-for-someone.html    [facility] - Interim Guidance for Hospital Discharge to Home: www.health.[facility].mn.us/diseases/coronavirus/hcp/hospdischarge.pdf    Below are the COVID-19 hotlines at the [facility] ([facility]). Interpreters are available.  ? For health questions: Call 513-252-9663 or 1-161.329.5145 (7 a.m. to 7 p.m.)  ? For questions about schools and childcare: Call 978-665-6699 or 1-780.864.6571 (7 a.m. to 7 p.m.)    For informational purposes only. Not to replace the advice of your health care provider. Clinically reviewed by Dr. Ronaldo Burnett.   Copyright   2020 [employer]. All rights reserved. [employer] 218930 - REV 01/05/21.     Diet    Follow this diet upon discharge: Orders Placed This Encounter      Regular Diet Adult     Discharge Medications   Current Discharge Medication List      START taking these medications    Details    Lidocaine (LIDOCARE) 4 % Patch Place 1 patch onto the skin every 24 hours To prevent lidocaine toxicity, patient should be patch free for 12 hrs daily.    Associated Diagnoses: Closed fracture of multiple ribs of right side, initial encounter; Closed fracture of transverse process of cervical vertebra, initial encounter (H); Traumatic hematoma of flank, initial encounter      methocarbamol (ROBAXIN) 500 MG tablet Take 1 tablet (500 mg) by mouth 4 times daily  Qty: 20 tablet, Refills: 0    Associated Diagnoses: Closed fracture of multiple ribs of right side, initial encounter; Closed fracture of transverse process of cervical vertebra, initial encounter (H); Traumatic hematoma of flank, initial encounter      oxyCODONE (ROXICODONE) 5 MG tablet Take 1 tablet (5 mg) by mouth every 6 hours as needed for moderate to severe pain  Qty: 12 tablet, Refills: 0    Associated Diagnoses: Closed fracture of multiple ribs of right side, initial encounter; Closed fracture of transverse process of cervical vertebra, initial encounter (H); Traumatic hematoma of flank, initial encounter      senna-docusate (SENOKOT-S/PERICOLACE) 8.6-50 MG tablet Take 1 tablet by mouth 2 times daily as needed for constipation    Associated Diagnoses: Drug-induced constipation         CONTINUE these medications which have NOT CHANGED    Details   acetaminophen (TYLENOL) 500 MG tablet Take 500-1,000 mg by mouth 2 times daily       aspirin (ASA) 81 MG chewable tablet Take 81 mg by mouth daily       cetirizine (ZYRTEC) 10 MG tablet Take 10 mg by mouth every morning       diazepam (VALIUM) 5 MG tablet Take 1 tablet (5 mg) by mouth nightly as needed for anxiety  Qty: 30 tablet, Refills: 0    Associated Diagnoses: Back muscle spasm      donepezil (ARICEPT) 10 MG tablet Take 1 tablet (10 mg) by mouth At Bedtime  Qty: 90 tablet, Refills: 0    Associated Diagnoses: Memory difficulties      folic acid (FOLVITE) 1 MG tablet Take 5 tablets  by mouth daily  Qty:  450 tablet, Refills: 3    Associated Diagnoses: Major depressive disorder, recurrent episode, in full remission (H)      guanFACINE (TENEX) 1 MG tablet Take 1 tablet (1 mg) by mouth At Bedtime  Qty: 90 tablet, Refills: 0    Associated Diagnoses: Major depressive disorder, recurrent episode, moderate (H)      levothyroxine (SYNTHROID/LEVOTHROID) 150 MCG tablet Take 1 tablet (150 mcg) by mouth daily  Qty: 90 tablet, Refills: 3    Associated Diagnoses: Acquired hypothyroidism      mirabegron (MYRBETRIQ) 50 MG 24 hr tablet Take 50 mg by mouth every evening      pregabalin (LYRICA) 100 MG capsule Take 1 capsule (100 mg) by mouth 3 times daily Do not take if sleepy/sedated.  Qty: 270 capsule, Refills: 3    Comments: Profile Rx: patient will contact pharmacy when needed  Associated Diagnoses: Chronic bilateral low back pain without sciatica      propafenone (RYTHMOL) 150 MG TABS tablet Take 1 tablet (150 mg) by mouth every 8 hours  Qty: 270 tablet, Refills: 1    Comments: Profile Rx: patient will contact pharmacy when needed  Associated Diagnoses: Chronic atrial fibrillation (H)      simvastatin (ZOCOR) 40 MG tablet Take 1 tablet (40 mg) by mouth At Bedtime. Need to update cholesterol level before additional refills.  Qty: 90 tablet, Refills: 3    Associated Diagnoses: Hyperlipidemia LDL goal <130      venlafaxine (EFFEXOR-XR) 75 MG 24 hr capsule Take 3 capsules (225 mg) by mouth daily  Qty: 270 capsule, Refills: 1    Associated Diagnoses: Depression, unspecified depression type      warfarin ANTICOAGULANT (COUMADIN) 2.5 MG tablet Take 5 mg by mouth daily 7.5mg=Wed and Sun, 5mg=ROW         STOP taking these medications       cyclobenzaprine (FLEXERIL) 10 MG tablet Comments:   Reason for Stopping:         enoxaparin ANTICOAGULANT (LOVENOX) 100 MG/ML syringe Comments:   Reason for Stopping:             Allergies   Allergies   Allergen Reactions     Blood Transfusion Related (Informational Only) Other (See Comments)      Patient has a history of a clinically significant antibody against RBC antigens.  A delay in compatible RBCs may occur.       Gabapentin      Severe behavioral disturbances     Data   Results for orders placed or performed during the hospital encounter of 01/18/22   CT Chest/Abdomen/Pelvis w Contrast     Status: None    Narrative    CT CHEST/ABDOMEN/PELVIS WITH CONTRAST  1/18/2022 1:14 PM    CLINICAL HISTORY: Fall, right and left flank pain, large left flank  hematoma, back pain. Coumadin.    TECHNIQUE: CT scan of the chest, abdomen, and pelvis was performed  following injection of IV contrast. Multiplanar reformats were  obtained. Dose reduction techniques were used.   CONTRAST: 100 mL [id number]    COMPARISON: CT chest, abdomen and pelvis 5/18/2021.    FINDINGS:   LUNGS AND PLEURA: No effusions. No pneumothorax. No acute airspace  disease.    MEDIASTINUM/AXILLAE: No acute thoracic aortic abnormality. Stable  configuration of ascending aortic endograft and aortic valve  postoperative changes. The proximal aspect of the thoracic aorta is  4.4 cm, stable. No acute mediastinal abnormality. No suspicious lymph  nodes.    CORONARY ARTERY CALCIFICATION: Severe.    HEPATOBILIARY: Cholecystectomy. No acute liver abnormality.    PANCREAS: Normal.    SPLEEN: Normal.    ADRENAL GLANDS: Normal.    KIDNEYS/BLADDER: Incidental left-sided parapelvic renal cysts without  specific follow-up recommended. No acute renal abnormality. No  hydronephrosis. Small cyst or angiomyolipoma at the posterior right  kidney appears stable.    BOWEL: No obstruction or inflammation. Normal appendix.    PELVIC ORGANS: No acute abnormality. Prostate is 5.4 cm. Streak  artifact obscures structures.    ADDITIONAL FINDINGS: Prominent subcutaneous lobulated hematoma with  surrounding edema along the left lateral posterior flank extending  posterior and lateral to the gluteal level. A locule of blood is 5.3  cm in this region, series 3 image 238. There  are other locules noted.  Small subcutaneous contusion suggested along the right posterior lower  back, series 3 image 136.    MUSCULOSKELETAL: Acute fractures involving the right posterior 11th  and 12th ribs. Acute fractures involving the right lateral L1 and L2  transverse processes. Diffuse spine degenerative changes. Right hip  arthroplasty. A few subacute healed rib fractures.  Sternotomy.      Impression    IMPRESSION:  1.  Acute fractures at the right posterior 11th and 12th ribs. Acute  fractures of the right lateral L1 and L2 transverse processes.  2.  Prominent subcutaneous hematoma at the left flank and extending to  the left posterior pelvic level. Tiny opacities at the right posterior  back may represent small subcutaneous contusion.  3.  No other acute traumatic abnormality is seen.  4.  Stable postoperative change of the ascending thoracic aorta and  aortic valve.    RAYO CHIN MD         SYSTEM ID:  [id number]   XR Lumbar Spine 2/3 Views     Status: None    Narrative    LUMBAR SPINE TWO - THREE VIEWS   1/19/2022 3:23 PM     HISTORY: Assess for fracture from fall.    COMPARISON: MRI 4/16/2013      Impression    IMPRESSION: No acute fracture is identified. Moderate to severe  degenerative change. L3-4 laminectomy change. Bilateral sacroiliac  joint degenerative change. Sacrum is partially obscured by bowel gas.  Left hip prosthesis. Cholecystectomy clips. Vascular calcifications.    BRADLEY ROMO MD         SYSTEM ID:  [id number]   Extra Blue Top Tube     Status: None   Result Value Ref Range    Hold Specimen JIC    Extra Red Top Tube     Status: None   Result Value Ref Range    Hold Specimen JIC    Extra Green Top (Lithium Heparin) Tube     Status: None   Result Value Ref Range    Hold Specimen JIC    Extra Purple Top Tube     Status: None   Result Value Ref Range    Hold Specimen JIC    INR     Status: Abnormal   Result Value Ref Range    INR 3.82 (H) 0.85 - 1.15   Comprehensive metabolic panel      Status: Abnormal   Result Value Ref Range    Sodium 139 133 - 144 mmol/L    Potassium 3.8 3.4 - 5.3 mmol/L    Chloride 105 94 - 109 mmol/L    Carbon Dioxide (CO2) 31 20 - 32 mmol/L    Anion Gap 3 3 - 14 mmol/L    Urea Nitrogen 19 7 - 30 mg/dL    Creatinine 0.81 0.66 - 1.25 mg/dL    Calcium 8.6 8.5 - 10.1 mg/dL    Glucose 110 (H) 70 - 99 mg/dL    Alkaline Phosphatase 89 40 - 150 U/L    AST 18 0 - 45 U/L    ALT 21 0 - 70 U/L    Protein Total 6.5 (L) 6.8 - 8.8 g/dL    Albumin 2.7 (L) 3.4 - 5.0 g/dL    Bilirubin Total 2.6 (H) 0.2 - 1.3 mg/dL    GFR Estimate >90 >60 mL/min/1.73m2   UA with Microscopic reflex to Culture     Status: Abnormal    Specimen: Urine, Midstream   Result Value Ref Range    Color Urine Orange (A) Colorless, Straw, Light Yellow, Yellow    Appearance Urine Clear Clear    Glucose Urine Negative Negative mg/dL    Bilirubin Urine Negative Negative    Ketones Urine 15  (A) Negative mg/dL    Specific Gravity Urine 1.025 1.003 - 1.035    Blood Urine Negative Negative    pH Urine 6.0 5.0 - 7.0    Protein Albumin Urine 30  (A) Negative mg/dL    Urobilinogen Urine 3.0 (A) Normal, 2.0 mg/dL    Nitrite Urine Negative Negative    Leukocyte Esterase Urine Negative Negative    Mucus Urine Present (A) None Seen /LPF    RBC Urine 2 <=2 /HPF    WBC Urine 4 <=5 /HPF    Squamous Epithelials Urine <1 <=1 /HPF    Narrative    Urine Culture not indicated   Creatinine POCT     Status: Normal   Result Value Ref Range    Creatinine POCT 0.8 0.7 - 1.3 mg/dL    GFR, ESTIMATED POCT >60 >60 mL/min/1.73m2   CBC with platelets and differential     Status: Abnormal   Result Value Ref Range    WBC Count 8.3 4.0 - 11.0 10e3/uL    RBC Count 3.25 (L) 4.40 - 5.90 10e6/uL    Hemoglobin 10.4 (L) 13.3 - 17.7 g/dL    Hematocrit 31.8 (L) 40.0 - 53.0 %    MCV 98 78 - 100 fL    MCH 32.0 26.5 - 33.0 pg    MCHC 32.7 31.5 - 36.5 g/dL    RDW 13.8 10.0 - 15.0 %    Platelet Count 274 150 - 450 10e3/uL    % Neutrophils 71 %    % Lymphocytes 15 %    %  Monocytes 11 %    % Eosinophils 2 %    % Basophils 0 %    % Immature Granulocytes 1 %    NRBCs per 100 WBC 0 <1 /100    Absolute Neutrophils 5.9 1.6 - 8.3 10e3/uL    Absolute Lymphocytes 1.2 0.8 - 5.3 10e3/uL    Absolute Monocytes 0.9 0.0 - 1.3 10e3/uL    Absolute Eosinophils 0.1 0.0 - 0.7 10e3/uL    Absolute Basophils 0.0 0.0 - 0.2 10e3/uL    Absolute Immature Granulocytes 0.1 <=0.4 10e3/uL    Absolute NRBCs 0.0 10e3/uL   Hemoglobin     Status: Abnormal   Result Value Ref Range    Hemoglobin 9.6 (L) 13.3 - 17.7 g/dL   Asymptomatic COVID-19 Virus (Coronavirus) by PCR Nasopharyngeal     Status: Abnormal    Specimen: Nasopharyngeal; Swab   Result Value Ref Range    SARS CoV2 PCR Positive (A) Negative    Narrative    Testing was performed using the claudine  SARS-CoV-2 & Influenza A/B Assay on the claudine  Angie  System.  This test should be ordered for the detection of SARS-COV-2 in individuals who meet SARS-CoV-2 clinical and/or epidemiological criteria. Test performance is unknown in asymptomatic patients.  This test is for in vitro diagnostic use under the FDA EUA for laboratories certified under CLIA to perform moderate and/or high complexity testing. This test has not been FDA cleared or approved.  A negative test does not rule out the presence of PCR inhibitors in the specimen or target RNA in concentration below the limit of detection for the assay. The possibility of a false negative should be considered if the patient's recent exposure or clinical presentation suggests COVID-19.  [facility] are certified under the Clinical Laboratory Improvement Amendments of 1988 (CLIA-88) as qualified to perform moderate and/or high complexity laboratory testing.   Hemoglobin     Status: Abnormal   Result Value Ref Range    Hemoglobin 10.5 (L) 13.3 - 17.7 g/dL   Glucose by meter     Status: Normal   Result Value Ref Range    GLUCOSE BY METER POCT 89 70 - 99 mg/dL   INR     Status: Abnormal   Result Value Ref  Range    INR 3.66 (H) 0.85 - 1.15   Basic metabolic panel     Status: Abnormal   Result Value Ref Range    Sodium 140 133 - 144 mmol/L    Potassium 4.0 3.4 - 5.3 mmol/L    Chloride 106 94 - 109 mmol/L    Carbon Dioxide (CO2) 32 20 - 32 mmol/L    Anion Gap 2 (L) 3 - 14 mmol/L    Urea Nitrogen 19 7 - 30 mg/dL    Creatinine 0.75 0.66 - 1.25 mg/dL    Calcium 8.6 8.5 - 10.1 mg/dL    Glucose 95 70 - 99 mg/dL    GFR Estimate >90 >60 mL/min/1.73m2   Hemoglobin     Status: Abnormal   Result Value Ref Range    Hemoglobin 10.2 (L) 13.3 - 17.7 g/dL   Magnesium     Status: Normal   Result Value Ref Range    Magnesium 2.0 1.6 - 2.3 mg/dL   Phosphorus     Status: Normal   Result Value Ref Range    Phosphorus 2.9 2.5 - 4.5 mg/dL   Hemoglobin     Status: Abnormal   Result Value Ref Range    Hemoglobin 10.9 (L) 13.3 - 17.7 g/dL   Surgery General IP Consult: Patient to be seen: Routine within 24 hrs; previous trauma with hematoma and elevated INR; Consultant may enter orders: Yes; Requesting provider? Hospitalist (if different from attending physician)     Status: None ()    Narrative    Herve Montilla MD     1/18/2022  9:59 PM  [facility] Surgery Consultation    Todd S Aschoff MRN# [id number]   Age: 66 year old YOB: 1955     Date of Admission:  1/18/2022    Reason for consult: Fall, rib fractures       Requesting physician: Kamran       Level of consult: Consult, follow and place orders           Assessment and Plan:   Assessment:   COVID positive  Patient Active Problem List    Diagnosis Date Noted     Traumatic hematoma of flank, initial encounter 01/18/2022     Priority: Medium     Fall, initial encounter 01/18/2022     Priority: Medium     Closed fracture of transverse process of cervical vertebra,   initial encounter (H) 01/18/2022     Priority: Medium     Closed fracture of multiple ribs of right side, initial   encounter 01/18/2022     Priority: Medium     Family hx of prostate cancer 12/07/2021      Priority: Medium     Left Asad-Paresthesias 06/18/2021     Priority: Medium     Spells of decreased attentiveness 06/18/2021     Priority: Medium     Hx of CVA x 4, 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 04/18/2021     Priority: Medium     Polypharmacy 01/22/2021     Priority: Medium     Renal insufficiency 01/22/2021     Priority: Medium     Hypotension, unspecified hypotension type 01/22/2021     Priority: Medium     Anemia, unspecified type 01/22/2021     Priority: Medium     Leg wound, left 01/09/2021     Priority: Medium     Hematoma from fall, followed by infection and skin graft       Suicide attempt by multiple drug overdose, initial encounter   (H) 11/27/2020     Priority: Medium     Depression, unspecified depression type 11/27/2020     Priority: Medium     Elevated prostate specific antigen (PSA) 01/22/2020     Priority: Medium     Alcohol dependence in remission (H) 08/02/2018     Priority: Medium     S/P lumbar fusion 04/05/2018     Priority: Medium     Spinal stenosis of lumbar region 04/05/2018     Priority: Medium     Obesity, BMI >35 with comorbidities 04/28/2017     Priority: Medium     Bilateral thoracic back pain 11/16/2016     Priority: Medium     Valium 5 mg at bedtime for years, per patient.         OAB (overactive bladder) 05/11/2015     Priority: Medium     Rotator cuff tear 01/26/2015     Priority: Medium     Overview:   Rotator cuff tear, right  Formatting of this note might be different from the original.  Rotator cuff tear, right       Memory difficulties 01/16/2015     Priority: Medium     Hx of Thoracic Dissection repair -- 1992 04/22/2013     Priority: Medium     Lumbar radiculopathy 04/03/2013     Priority: Medium     Obesity 03/25/2013     Priority: Medium     Tourette syndrome 10/30/2012     Priority: Medium     Chronic low back pain 08/19/2011     Priority: Medium     BPH (benign prostatic hypertrophy)      Priority: Medium     Follows with urology.        HYPERLIPIDEMIA LDL GOAL <130 10/31/2010      Priority: Medium     Mechanical AV Replacement 1992 -- on Warfarin       Priority: Medium     Bridging dose of lovenox = 105 mg twice daily.        Radiculitis 04/20/2010     Priority: Medium     Overview:   LW Modifier:  Right foot, s/p R AMANDA  LW Onset:  2002  ; Neuralgia  Formatting of this note might be different from the original.  LW Modifier:  Right foot, s/p R AMANDA  LW Onset:  2002  ; Neuralgia       PAF (paroxysmal atrial fibrillation) (H) 08/07/2002     Priority: Medium     Hypothyroidism 08/07/2002     Priority: Medium     Problem list name updated by automated process. Provider to   review             Plan:   Follow Hg and INR, goal in the lower part of therapeutic/target   range, need to follow this closely with his mechanical valve   (discussed with ER MD, recomended minimal reversal at most)  Pain medication              Chief Complaint:   Fall 1/12     History is obtained from the patient, electronic health record   and emergency department physician         History of Present Illness:   This patient is a 66 year old  male with a significant   past medical history of mechanical Aortic valve on coumadin who   fell 6 days ago. He slipped on a step and fell onto his back. He   has had fluctuating Sx of dizziness, back and flank pain,   bruising and dark urine. He has had no head injury and no SOB. He   reports eating and drinkign and stooling after the fall but   thinks UO is decreased. Pain improved after ER medication.            Past Medical History:     Past Medical History:   Diagnosis Date     Advanced directives, counseling/discussion 1/6/2012    Discussed Advance Directive planning with patient; information   given to patient to review.     Alcohol dependence (H)      Alcohol dependence in remission (H) 8/2/2018     Allergy, unspecified not elsewhere classified     seasonal     Anemia, unspecified type 1/22/2021     Aortic valve prosthesis present 2/8/2021     Atrial fibrillation (H)       Benign prostatic hyperplasia 2/8/2021     Bilateral thoracic back pain 11/16/2016     BPH (benign prostatic hypertrophy)      Chronic atrial fibrillation (H) 8/7/2002     Chronic infection     low back wound incision not healing      Chronic low back pain 8/19/2011     Chronic pain     lower back and right leg and left leg     Depressive disorder 6/9/2010    Depression  NOS     Dissection of aorta, thoracic (H) 1992    St Jose F aotic valve + arch graft 1992     Elevated prostate specific antigen (PSA) 1/22/2020     Family history of prostate cancer 1/22/2020     Generalized muscle weakness 1/22/2021     Headache(784.0)      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 Provider to review     Leg wound, left 1/9/2021     Long term current use of anticoagulant therapy 8/7/2002    LW Modifier:  Coumadin, since mechanical aortic valve LW Onset:    1992 ; Anticoagulant Rx Long Term Problem list name updated by   automated process. Provider to review     Low back pain      Lumbar radiculopathy 4/3/2013     Lumbar surgical wound fluid collection 5/23/2013     Major depression      Memory difficulties 1/16/2015     Mixed hyperlipidemia      Morbid obesity (H) 4/20/2010    LW Modifier:  BMI 41.3 (Apr 2010) ; Obesity Morbid     Numbness and tingling     right leg post surg rightt hip/ also left leg since surg     OA (osteoarthritis)     hips     OAB (overactive bladder) 5/11/2015     Obesity, unspecified      Persons encountering health services in other specified   circumstances 4/3/2012    Formatting of this note might be different from the original.   EMERGENCY CARE PLAN Presenting Problem Signs and Symptoms   Treatment Plan   Questions or conerns during clinic hours    I    will call the clinic directly    Questions or conerns outside   clinic hours    I will call the 24 hour nurse line at   [phone]   Patient needs to schedule an appointment    I will   call the 24 hour scheduling team at     Polypharmacy 1/22/2021     Prostate infection      Radiculitis 4/20/2010    LW Modifier:  Right foot, s/p R AMANDA LW Onset:  2002 ; Neuralgia     Recurrent major depressive episodes, in full remission (H)   10/30/2012     Renal insufficiency 1/22/2021     Rotator cuff tear 1/26/2015    Rotator cuff tear, right     S/P lumbar fusion 4/5/2018     S/P [employer]. 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 Jose F's valve     APPLY WOUND VAC Left 1/26/2021    Procedure: Placement of negative pressure wound therapy 2,400   squared centimeters  ;  Surgeon: Lazaro Plascencia MD;  Location: [facility]   OR     CATARACT IOL, RT/LT      rt eye only     CHOLECYSTECTOMY, LAPOROSCOPIC  8/10     CLOSE SECONDARY WOUND LOWER EXTREMITY Left 2/3/2021    Procedure:  Split Thickness Skin Graft to Leg of 18 square   centimeters  Intermediate Closure of Leg of 8cm   ;  Surgeon:   Lazaro Plascencia MD;  Location: [facility] OR     COLONOSCOPY N/A 1/15/2015    Procedure: COLONOSCOPY;  Surgeon: Johnson Kothari MD;    Location: [facility] GI     DECOMPRESSION LUMBAR ONE LEVEL  4/3/2013    Procedure: DECOMPRESSION LUMBAR ONE LEVEL;  Open Decompression   L3-4 bilateral;  Surgeon: Khalif Casas MD;  Location: [facility]   OR     DECOMPRESSION, FUSION CERVICAL  ANTERIOR ONE LEVEL, COMBINED    3/23/2012    Procedure:COMBINED DECOMPRESSION, FUSION CERVICAL ANTERIOR ONE   LEVEL; Anterior Cervical Decompression and Fusion C4-6;   Surgeon:KHALIF COFFEY; Location:[facility] OR     ELBOW SURGERY       EXPLORE SPINE, REMOVE HARDWARE, COMBINED  5/23/2013    Procedure: COMBINED EXPLORE SPINE, REMOVE HARDWARE;  Exploration   Lumbar Wound for fluid collection;  Surgeon: Khalif Coffey MD;  Location: [facility] OR     FUSION CERVICAL ANTERIOR TWO LEVELS  3/26/2012    Procedure:FUSION CERVICAL ANTERIOR TWO LEVELS; Anterior Cervical   Fusion C4-6, Anterior Cervical  Hematoma Evacuation; Surgeon:KHALIF COFFEY; Location:[facility] OR       IR LUMBAR EPIDURAL STEROID INJECTION  3/28/2003     IRRIGATION AND DEBRIDEMENT LOWER EXTREMITY, COMBINED Left   1/10/2021    Procedure: 1.  Irrigation and excisional debridement to muscle   left distal medial calf chronic wounds total area measuring 9 cm   x 4 cm 2.  Irrigation and excisional debridement to fascia left   medial calf chronic hematoma measuring 29 x 6.7 x 4.2 cm 3.    Primary complex wound closure left medial distal calf 9 cm in   length;  Surgeon: Rod Kemp MD;  Location: [facility] OR     IRRIGATION AND DEBRIDEMENT LOWER EXTREMITY, COMBINED Left   1/26/2021    Procedure: Evacuation of hematoma debridement of skin,   subcutaneous tissue and muscle 2,400 squared centimeters,   placement of negative pressure wound therapy 2,400 squared   centimeters;  Surgeon: Lazaro Plascencia MD;  Location: [facility] OR     IRRIGATION AND DEBRIDEMENT SPINE, CLOSE WOUND, COMBINED    3/26/2012    Procedure:COMBINED IRRIGATION AND DEBRIDEMENT SPINE, CLOSE   WOUND; Surgeon:KHALIF COFFEY; Location:[facility] OR     OTHER SURGICAL HISTORY      MT UNLISTED ORBIT     OTHER SURGICAL HISTORY      MT UNLISTED SPINE     OTHER SURGICAL HISTORY      MT UNLISTED NECK/THORAX     OTHER SURGICAL HISTORY      (IA) MT TOTAL HIP ARTHROPLASTY     OTHER SURGICAL HISTORY      (IA) MT NEE  SCOPE MED W LAT MENISCECT WWO DEBRIBE/SHAVE ANY CO     removal of cyst of back   2.5week     TONSILLECTOMY       wisdom teeth[       [email or web address] NONSPECIFIC PROCEDURE  1992    repair of TAA with graft     [facility] [email or web address] TOTAL HIP ARTHROPLASTY  2010    Left AMANDA     [facility] [[email or web address] TOTAL HIP ARTHROPLASTY  2002    R hip replacement comp's by nerve injury with pain down into R   leg, nadya below knee             Social History:     Social History     Tobacco Use     Smoking status: Never Smoker     Smokeless tobacco: Never Used   Substance Use Topics     Alcohol use: No     Comment: Stopped drinking alcohol ~2009             Family History:     Family History   Problem Relation Age of Onset     Cerebrovascular Disease Father          age 86, had M.I. ,diabetic also     Prostate Cancer Father      Diabetes Father      Cancer Mother          age 83 of dementia, also h/o lymphoma     Diabetes Mother      Hypertension Brother         2 brothers with hypertension & sleep apnea     Hypertension Sister         Born ~1936     Sleep Apnea Brother          age 78, Alzheimers     No Known Problems Son      No Known Problems Daughter      No Known Problems Son      Family History Negative Brother         Had 5 brothers, three still alive as of      Colon Cancer No family hx of              Immunizations:     VACCINE/DOSE   Diptheria   DPT   DTAP   HBIG   Hepatitis A   Hepatitis B   HIB   Influenza   Measles   Meningococcal   MMR   Mumps   Pneumococcal   Polio   Rubella   Small Pox   TDAP   Varicella   Zoster             Allergies:     Allergies   Allergen Reactions     Gabapentin      Severe behavioral disturbances             Medications:     Current Facility-Administered Medications   Medication     acetaminophen (TYLENOL) tablet 975 mg     Lidocaine (LIDOCARE) 4 % Patch 1 patch     lidocaine (LMX4) cream     lidocaine 1 % 0.1-1 mL     lidocaine patch in PLACE     melatonin tablet 1 mg     methocarbamol (ROBAXIN) tablet 500 mg     ondansetron  (ZOFRAN-ODT) ODT tab 4 mg    Or     ondansetron (ZOFRAN) injection 4 mg     oxyCODONE (ROXICODONE) tablet 5 mg     polyethylene glycol (MIRALAX) Packet 17 g     senna-docusate (SENOKOT-S/PERICOLACE) 8.6-50 MG per tablet 1   tablet    Or     senna-docusate (SENOKOT-S/PERICOLACE) 8.6-50 MG per tablet 2   tablet     sodium chloride (PF) 0.9% PF flush 3 mL     sodium chloride (PF) 0.9% PF flush 3 mL             Review of Systems:   CV: NEGATIVE for chest pain, palpitations or peripheral edema  C: NEGATIVE for fever, chills, change in weight  E/M: NEGATIVE for ear, mouth and throat problems  R: NEGATIVE for significant cough or SOB          Physical Exam:   All vitals have been reviewed  Patient Vitals for the past 24 hrs:   BP Temp Temp src Pulse Resp SpO2   01/18/22 2030 -- -- -- -- -- 92 %   01/18/22 2015 -- -- -- -- -- 93 %   01/18/22 2000 -- -- -- -- -- 90 %   01/18/22 1945 -- -- -- -- -- 91 %   01/18/22 1930 -- -- -- -- -- 91 %   01/18/22 1900 132/78 -- -- -- 18 93 %   01/18/22 1845 -- -- -- -- -- 91 %   01/18/22 1815 -- -- -- -- -- 93 %   01/18/22 1800 -- -- -- -- -- 99 %   01/18/22 1745 -- -- -- -- -- 97 %   01/18/22 1730 -- -- -- -- -- 96 %   01/18/22 1645 -- -- -- -- -- 96 %   01/18/22 1630 -- -- -- -- -- 94 %   01/18/22 1615 -- -- -- -- -- 94 %   01/18/22 1600 122/74 -- -- 77 -- 93 %   01/18/22 1530 -- -- -- 79 -- 90 %   01/18/22 1515 -- -- -- 83 -- 100 %   01/18/22 1509 -- -- -- -- -- 97 %   01/18/22 1500 115/74 -- -- 88 -- 97 %   01/18/22 1230 -- -- -- -- -- 99 %   01/18/22 1215 (!) 148/85 -- -- 79 -- 99 %   01/18/22 1200 (!) 129/90 -- -- 78 -- 100 %   01/18/22 1145 138/86 -- -- 78 -- 99 %   01/18/22 1130 124/77 -- -- 77 -- 93 %   01/18/22 1115 139/86 -- -- 77 -- 92 %   01/18/22 1100 135/86 -- -- 78 -- 96 %   01/18/22 1003 124/74 97.5  F (36.4  C) Temporal 81 20 94 %     No intake or output data in the 24 hours ending 01/18/22 2121  Chest:   Nl resp effort, somewhat decreased but otherwise nl breath   sounds      Abdomen:   soft, non-distended, non-tender, voluntary guarding absent,   large ecchymosis and moderate induration left flank/buttock and   thigh, old midline scar with inflammation caudally - ? Suture   granuloma- chronic per pt             Data:   All laboratory data reviewed  Results for orders placed or performed during the hospital   encounter of 01/18/22   CT Chest/Abdomen/Pelvis w Contrast     Status: None    Narrative    CT CHEST/ABDOMEN/PELVIS WITH CONTRAST  1/18/2022 1:14 PM    CLINICAL HISTORY: Fall, right and left flank pain, large left   flank  hematoma, back pain. Coumadin.    TECHNIQUE: CT scan of the chest, abdomen, and pelvis was   performed  following injection of IV contrast. Multiplanar reformats were  obtained. Dose reduction techniques were used.   CONTRAST: 100 mL Isovue-[id number]    COMPARISON: CT chest, abdomen and pelvis 5/18/2021.    FINDINGS:   LUNGS AND PLEURA: No effusions. No pneumothorax. No acute   airspace  disease.    MEDIASTINUM/AXILLAE: No acute thoracic aortic abnormality. Stable  configuration of ascending aortic endograft and aortic valve  postoperative changes. The proximal aspect of the thoracic aorta   is  4.4 cm, stable. No acute mediastinal abnormality. No suspicious   lymph  nodes.    CORONARY ARTERY CALCIFICATION: Severe.    HEPATOBILIARY: Cholecystectomy. No acute liver abnormality.    PANCREAS: Normal.    SPLEEN: Normal.    ADRENAL GLANDS: Normal.    KIDNEYS/BLADDER: Incidental left-sided parapelvic renal cysts   without  specific follow-up recommended. No acute renal abnormality. No  hydronephrosis. Small cyst or angiomyolipoma at the posterior   right  kidney appears stable.    BOWEL: No obstruction or inflammation. Normal appendix.    PELVIC ORGANS: No acute abnormality. Prostate is 5.4 cm. Streak  artifact obscures structures.    ADDITIONAL FINDINGS: Prominent subcutaneous lobulated hematoma   with  surrounding edema along the left lateral posterior flank    extending  posterior and lateral to the gluteal level. A locule of blood is   5.3  cm in this region, series 3 image 238. There are other locules   noted.  Small subcutaneous contusion suggested along the right posterior   lower  back, series 3 image 136.    MUSCULOSKELETAL: Acute fractures involving the right posterior   11th  and 12th ribs. Acute fractures involving the right lateral L1 and   L2  transverse processes. Diffuse spine degenerative changes. Right   hip  arthroplasty. A few subacute healed rib fractures.  Sternotomy.      Impression    IMPRESSION:  1.  Acute fractures at the right posterior 11th and 12th ribs.   Acute  fractures of the right lateral L1 and L2 transverse processes.  2.  Prominent subcutaneous hematoma at the left flank and   extending to  the left posterior pelvic level. Tiny opacities at the right   posterior  back may represent small subcutaneous contusion.  3.  No other acute traumatic abnormality is seen.  4.  Stable postoperative change of the ascending thoracic aorta   and  aortic valve.    RAYO CHIN MD         SYSTEM ID:  [id number]   Extra Blue Top Tube     Status: None   Result Value Ref Range    Hold Specimen JIC    Extra Red Top Tube     Status: None   Result Value Ref Range    Hold Specimen JIC    Extra Green Top (Lithium Heparin) Tube     Status: None   Result Value Ref Range    Hold Specimen JIC    Extra Purple Top Tube     Status: None   Result Value Ref Range    Hold Specimen JIC    INR     Status: Abnormal   Result Value Ref Range    INR 3.82 (H) 0.85 - 1.15   Comprehensive metabolic panel     Status: Abnormal   Result Value Ref Range    Sodium 139 133 - 144 mmol/L    Potassium 3.8 3.4 - 5.3 mmol/L    Chloride 105 94 - 109 mmol/L    Carbon Dioxide (CO2) 31 20 - 32 mmol/L    Anion Gap 3 3 - 14 mmol/L    Urea Nitrogen 19 7 - 30 mg/dL    Creatinine 0.81 0.66 - 1.25 mg/dL    Calcium 8.6 8.5 - 10.1 mg/dL    Glucose 110 (H) 70 - 99 mg/dL    Alkaline Phosphatase 89 40 - 150  U/L    AST 18 0 - 45 U/L    ALT 21 0 - 70 U/L    Protein Total 6.5 (L) 6.8 - 8.8 g/dL    Albumin 2.7 (L) 3.4 - 5.0 g/dL    Bilirubin Total 2.6 (H) 0.2 - 1.3 mg/dL    GFR Estimate >90 >60 mL/min/1.73m2   UA with Microscopic reflex to Culture     Status: Abnormal    Specimen: Urine, Midstream   Result Value Ref Range    Color Urine Orange (A) Colorless, Straw, Light Yellow, Yellow    Appearance Urine Clear Clear    Glucose Urine Negative Negative mg/dL    Bilirubin Urine Negative Negative    Ketones Urine 15  (A) Negative mg/dL    Specific Gravity Urine 1.025 1.003 - 1.035    Blood Urine Negative Negative    pH Urine 6.0 5.0 - 7.0    Protein Albumin Urine 30  (A) Negative mg/dL    Urobilinogen Urine 3.0 (A) Normal, 2.0 mg/dL    Nitrite Urine Negative Negative    Leukocyte Esterase Urine Negative Negative    Mucus Urine Present (A) None Seen /LPF    RBC Urine 2 <=2 /HPF    WBC Urine 4 <=5 /HPF    Squamous Epithelials Urine <1 <=1 /HPF    Narrative    Urine Culture not indicated   Creatinine POCT     Status: Normal   Result Value Ref Range    Creatinine POCT 0.8 0.7 - 1.3 mg/dL    GFR, ESTIMATED POCT >60 >60 mL/min/1.73m2   CBC with platelets and differential     Status: Abnormal   Result Value Ref Range    WBC Count 8.3 4.0 - 11.0 10e3/uL    RBC Count 3.25 (L) 4.40 - 5.90 10e6/uL    Hemoglobin 10.4 (L) 13.3 - 17.7 g/dL    Hematocrit 31.8 (L) 40.0 - 53.0 %    MCV 98 78 - 100 fL    MCH 32.0 26.5 - 33.0 pg    MCHC 32.7 31.5 - 36.5 g/dL    RDW 13.8 10.0 - 15.0 %    Platelet Count 274 150 - 450 10e3/uL    % Neutrophils 71 %    % Lymphocytes 15 %    % Monocytes 11 %    % Eosinophils 2 %    % Basophils 0 %    % Immature Granulocytes 1 %    NRBCs per 100 WBC 0 <1 /100    Absolute Neutrophils 5.9 1.6 - 8.3 10e3/uL    Absolute Lymphocytes 1.2 0.8 - 5.3 10e3/uL    Absolute Monocytes 0.9 0.0 - 1.3 10e3/uL    Absolute Eosinophils 0.1 0.0 - 0.7 10e3/uL    Absolute Basophils 0.0 0.0 - 0.2 10e3/uL    Absolute Immature Granulocytes 0.1  <=0.4 10e3/uL    Absolute NRBCs 0.0 10e3/uL   Hemoglobin     Status: Abnormal   Result Value Ref Range    Hemoglobin 9.6 (L) 13.3 - 17.7 g/dL   Asymptomatic COVID-19 Virus (Coronavirus) by PCR Nasopharyngeal       Status: Abnormal    Specimen: Nasopharyngeal; Swab   Result Value Ref Range    SARS CoV2 PCR Positive (A) Negative    Narrative    Testing was performed using the claudine  SARS-CoV-2 & Influenza   A/B Assay on the claudine  Angie  System.  This test should be   ordered for the detection of SARS-COV-2 in individuals who meet   SARS-CoV-2 clinical and/or epidemiological criteria. Test   performance is unknown in asymptomatic patients.  This test is   for in vitro diagnostic use under the FDA EUA for laboratories   certified under CLIA to perform moderate and/or high complexity   testing. This test has not been FDA cleared or approved.  A   negative test does not rule out the presence of PCR inhibitors in   the specimen or target RNA in concentration below the limit of   detection for the assay. The possibility of a false negative   should be considered if the patient's recent exposure or clinical   presentation suggests COVID-19.  [facility]   are certified under the Clinical Laboratory Improvement   Amendments of 1988 (CLIA-88) as qualified to perform moderate   and/or high complexity laboratory testing.   Adult Type and Screen     Status: None   Result Value Ref Range    ABO/RH(D) A POS     Antibody Screen Negative Negative    SPECIMEN EXPIRATION DATE 20220121235900    Prepare red blood cells (unit)     Status: None (Preliminary   result)   Result Value Ref Range    CROSSMATCH COMPATIBLE     UNIT ABO/RH A Pos     Unit Number [id number]     Unit Status Ready     Blood Component Type Red Blood Cells     Product Code G0334I27     CODING SYSTEM [id number]     UNIT TYPE ISBT 6200    Prepare red blood cells (unit)     Status: None (Preliminary   result)   Result Value Ref Range    CROSSMATCH  COMPATIBLE     UNIT ABO/RH A Pos     Unit Number [id number]     Unit Status Ready     Blood Component Type Red Blood Cells     Product Code R3285F91     CODING SYSTEM [id number]     UNIT TYPE ISBT 6200    Extra Tube ([place] Draw)     Status: None    Narrative    The following orders were created for panel order Extra Tube   ([place] Draw).  Procedure                               Abnormality           Status                     ---------                               -----------           ------                     Extra Blue Top Tube[[id number]]                              Final   result               Extra Red Top Tube[[id number]]                               Final   result               Extra Green Top (Lithium...[[id number]]                      Final   result               Extra Purple Top Tube[[id number]]                            Final   result                 Please view results for these tests on the individual orders.   CBC with platelets differential     Status: Abnormal    Narrative    The following orders were created for panel order CBC with   platelets differential.  Procedure                               Abnormality           Status                     ---------                               -----------           ------                     CBC with platelets and d...[[id number]]  Abnormal            Final   result                 Please view results for these tests on the individual orders.   ABO/Rh type and screen     Status: None    Narrative    The following orders were created for panel order ABO/Rh type   and screen.  Procedure                               Abnormality           Status                     ---------                               -----------           ------                     Adult Type and Screen[[id number]]                              [Narrative was truncated due to length]   Care Management / Social Work IP Consult     Status: None ()    Narrative    Rosa Ca RN      1/19/2022  2:36 PM  Care Management Discharge Note    Discharge Date: TBD     Additional Information:  CM consulted for discharge planning, per provider pt does not   have any discharge needs at this time.    Pt identified as a Service Bundle #4. Please consult CM/SW  if   discharge needs should arise.    Rosa Ca RN BSN   Inpatient Care Coordination  [facility]   Phone [phone]     Adult Type and Screen     Status: None   Result Value Ref Range    ABO/RH(D) A POS     Antibody Screen Negative Negative    SPECIMEN EXPIRATION DATE [id number]    Prepare red blood cells (unit)     Status: None (Preliminary result)   Result Value Ref Range    CROSSMATCH COMPATIBLE     UNIT ABO/RH A Pos     Unit Number [id number]     Unit Status Ready     Blood Component Type Red Blood Cells     Product Code [id number]     CODING SYSTEM [id number]     UNIT TYPE ISBT 6200    Prepare red blood cells (unit)     Status: None (Preliminary result)   Result Value Ref Range    CROSSMATCH COMPATIBLE     UNIT ABO/RH A Pos     Unit Number [id number]     Unit Status Ready     Blood Component Type Red Blood Cells     Product Code V5279I03     CODING SYSTEM [id number]     UNIT TYPE ISBT 6200    Extra Tube ([place] Draw)     Status: None    Narrative    The following orders were created for panel order Extra Tube ([place] Draw).  Procedure                               Abnormality         Status                     ---------                               -----------         ------                     Extra Blue Top Tube[[id number]]                              Final result               Extra Red Top Tube[[id number]]                               Final result               Extra Green Top (Lithium...[[id number]]                      Final result               Extra Purple Top Tube[[id number]]                            Final result                 Please view results for these tests on the individual orders.   CBC with  platelets differential     Status: Abnormal    Narrative    The following orders were created for panel order CBC with platelets differential.  Procedure                               Abnormality         Status                     ---------                               -----------         ------                     CBC with platelets and d...[[id number]]  Abnormal            Final result                 Please view results for these tests on the individual orders.   ABO/Rh type and screen     Status: None    Narrative    The following orders were created for panel order ABO/Rh type and screen.  Procedure                               Abnormality         Status                     ---------                               -----------         ------                     Adult Type and Screen[[id number]]                            Edited Result - FINAL        Please view results for these tests on the individual orders.       Lynda Lyn PA-C

## 2022-01-19 NOTE — PLAN OF CARE
"PRIMARY DIAGNOSIS: Hematoma of flank  OUTPATIENT/OBSERVATION GOALS TO BE MET BEFORE DISCHARGE:  1. ADLs back to baseline: Yes, pt is independent in room and uses a walker for assistance.    2. Activity and level of assistance: Ambulating independently.    3. Pain status: Improved-controlled with oral pain medications.     4. Return to near baseline physical activity: Yes     Discharge Planner Nurse   Safe discharge environment identified: Yes  Barriers to discharge: Yes     /52 (BP Location: Right arm)   Pulse 75   Temp 98.3  F (36.8  C) (Oral)   Resp 18   Ht 1.88 m (6' 2\")   Wt 139 kg (306 lb 6.4 oz)   SpO2 94%   BMI 39.34 kg/m       Pt admitted after falling and hitting his back, broken ribs (11,12), and hematoma of flank. Back is bruised, abdominal binder is placed. x2 PIV SL. Regular diet, tolerating well. Hgb is 10.5. Will encourage IS use, maintain airborne precautions for covid-19.        Entered by: Nae Ross 01/19/2022 5:41 AM     Please review provider order for any additional goals.   Nurse to notify provider when observation goals have been met and patient is ready for discharge.  "

## 2022-01-19 NOTE — CONSULTS
[facility] Surgery Consultation    Todd S Aschoff MRN# [id number]   Age: 66 year old YOB: 1955     Date of Admission:  1/18/2022    Reason for consult: Fall, rib fractures       Requesting physician: Kamran       Level of consult: Consult, follow and place orders           Assessment and Plan:   Assessment:   COVID positive  Patient Active Problem List    Diagnosis Date Noted     Traumatic hematoma of flank, initial encounter 01/18/2022     Priority: Medium     Fall, initial encounter 01/18/2022     Priority: Medium     Closed fracture of transverse process of cervical vertebra, initial encounter (H) 01/18/2022     Priority: Medium     Closed fracture of multiple ribs of right side, initial encounter 01/18/2022     Priority: Medium     Family hx of prostate cancer 12/07/2021     Priority: Medium     Left Asad-Paresthesias 06/18/2021     Priority: Medium     Spells of decreased attentiveness 06/18/2021     Priority: Medium     Hx of CVA x 4, 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 04/18/2021     Priority: Medium     Polypharmacy 01/22/2021     Priority: Medium     Renal insufficiency 01/22/2021     Priority: Medium     Hypotension, unspecified hypotension type 01/22/2021     Priority: Medium     Anemia, unspecified type 01/22/2021     Priority: Medium     Leg wound, left 01/09/2021     Priority: Medium     Hematoma from fall, followed by infection and skin graft       Suicide attempt by multiple drug overdose, initial encounter (H) 11/27/2020     Priority: Medium     Depression, unspecified depression type 11/27/2020     Priority: Medium     Elevated prostate specific antigen (PSA) 01/22/2020     Priority: Medium     Alcohol dependence in remission (H) 08/02/2018     Priority: Medium     S/P lumbar fusion 04/05/2018     Priority: Medium     Spinal stenosis of lumbar region 04/05/2018     Priority: Medium     Obesity, BMI >35 with comorbidities 04/28/2017     Priority: Medium     Bilateral thoracic back pain  11/16/2016     Priority: Medium     Valium 5 mg at bedtime for years, per patient.         OAB (overactive bladder) 05/11/2015     Priority: Medium     Rotator cuff tear 01/26/2015     Priority: Medium     Overview:   Rotator cuff tear, right  Formatting of this note might be different from the original.  Rotator cuff tear, right       Memory difficulties 01/16/2015     Priority: Medium     Hx of Thoracic Dissection repair -- 1992 04/22/2013     Priority: Medium     Lumbar radiculopathy 04/03/2013     Priority: Medium     Obesity 03/25/2013     Priority: Medium     Tourette syndrome 10/30/2012     Priority: Medium     Chronic low back pain 08/19/2011     Priority: Medium     BPH (benign prostatic hypertrophy)      Priority: Medium     Follows with urology.        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        Radiculitis 04/20/2010     Priority: Medium     Overview:   LW Modifier:  Right foot, s/p R AMANDA  LW Onset:  2002  ; Neuralgia  Formatting of this note might be different from the original.  LW Modifier:  Right foot, s/p R AMANDA  LW Onset:  2002  ; Neuralgia       PAF (paroxysmal atrial fibrillation) (H) 08/07/2002     Priority: Medium     Hypothyroidism 08/07/2002     Priority: Medium     Problem list name updated by automated process. Provider to review             Plan:   Follow Hg and INR, goal in the lower part of therapeutic/target range, need to follow this closely with his mechanical valve (discussed with ER MD, recomended minimal reversal at most)  Pain medication              Chief Complaint:   Fall 1/12     History is obtained from the patient, electronic health record and emergency department physician         History of Present Illness:   This patient is a 66 year old  male with a significant past medical history of mechanical Aortic valve on  coumadin who fell 6 days ago. He slipped on a step and fell onto his back. He has had fluctuating Sx of dizziness, back and flank pain, bruising and dark urine. He has had no head injury and no SOB. He reports eating and drinkign and stooling after the fall but thinks UO is decreased. Pain improved after ER medication.            Past Medical History:     Past Medical History:   Diagnosis Date     Advanced directives, counseling/discussion 1/6/2012    Discussed Advance Directive planning with patient; information given to patient to review.     Alcohol dependence (H)      Alcohol dependence in remission (H) 8/2/2018     Allergy, unspecified not elsewhere classified     seasonal     Anemia, unspecified type 1/22/2021     Aortic valve prosthesis present 2/8/2021     Atrial fibrillation (H)      Benign prostatic hyperplasia 2/8/2021     Bilateral thoracic back pain 11/16/2016     BPH (benign prostatic hypertrophy)      Chronic atrial fibrillation (H) 8/7/2002     Chronic infection     low back wound incision not healing      Chronic low back pain 8/19/2011     Chronic pain     lower back and right leg and left leg     Depressive disorder 6/9/2010    Depression  NOS     Dissection of aorta, thoracic (H) 1992    St Jose F aotic valve + arch graft 1992     Elevated prostate specific antigen (PSA) 1/22/2020     Family history of prostate cancer 1/22/2020     Generalized muscle weakness 1/22/2021     Headache(784.0)      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 Provider to review     Leg wound, left 1/9/2021     Long term current use of anticoagulant therapy 8/7/2002    LW Modifier:  Coumadin, since mechanical aortic valve LW Onset:  1992 ; Anticoagulant Rx Long Term  Problem list name updated by automated process. Provider to review     Low back pain      Lumbar radiculopathy 4/3/2013     Lumbar surgical wound fluid collection 5/23/2013     Major depression      Memory difficulties 1/16/2015     Mixed hyperlipidemia      Morbid obesity (H) 4/20/2010    LW Modifier:  BMI 41.3 (Apr 2010) ; Obesity Morbid     Numbness and tingling     right leg post surg rightt hip/ also left leg since surg     OA (osteoarthritis)     hips     OAB (overactive bladder) 5/11/2015     Obesity, unspecified      Persons encountering health services in other specified circumstances 4/3/2012    Formatting of this note might be different from the original. EMERGENCY CARE PLAN Presenting Problem Signs and Symptoms Treatment Plan   Questions or conerns during clinic hours    I will call the clinic directly    Questions or conerns outside clinic hours    I will call the 24 hour nurse line at [phone]   Patient needs to schedule an appointment    I will [facility] team [facility]     Prostate infection      Radiculitis 4/20/2010    LW Modifier:  Right foot, s/p R AMANDA LW Onset:  2002 ; Neuralgia     Recurrent major depressive episodes, in full remission (H) 10/30/2012     Renal insufficiency 1/22/2021     Rotator cuff tear 1/26/2015    Rotator cuff tear, right     S/P lumbar fusion 4/5/2018     S/P [employer] 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 Jose F's valve     APPLY WOUND  VAC Left 1/26/2021    Procedure: Placement of negative pressure wound therapy 2,400 squared centimeters  ;  Surgeon: Lazaro Plascencia MD;  Location: [facility] OR     CATARACT IOL, RT/LT      rt eye only     CHOLECYSTECTOMY, LAPOROSCOPIC  8/10     CLOSE SECONDARY WOUND LOWER EXTREMITY Left 2/3/2021    Procedure:  Split Thickness Skin Graft to Leg of 18 square centimeters  Intermediate Closure of Leg of 8cm   ;  Surgeon: Lazaro Plascencia MD;  Location: [facility] OR     COLONOSCOPY N/A 1/15/2015    Procedure: COLONOSCOPY;  Surgeon: Johnson Kothari MD;  Location:  GI     DECOMPRESSION LUMBAR ONE LEVEL  4/3/2013    Procedure: DECOMPRESSION LUMBAR ONE LEVEL;  Open Decompression L3-4 bilateral;  Surgeon: Travis Coffey MD;  Location: [facility] OR     DECOMPRESSION, FUSION CERVICAL ANTERIOR ONE LEVEL, COMBINED  3/23/2012    Procedure:COMBINED DECOMPRESSION, FUSION CERVICAL ANTERIOR ONE LEVEL; Anterior Cervical Decompression and Fusion C4-6; Surgeon:TRAVIS COFFEY; Location:[facility] OR     ELBOW SURGERY       EXPLORE SPINE, REMOVE HARDWARE, COMBINED  5/23/2013    Procedure: COMBINED EXPLORE SPINE, REMOVE HARDWARE;  Exploration Lumbar Wound for fluid collection;  Surgeon: Travis Coffey MD;  Location: [facility] OR     FUSION CERVICAL ANTERIOR TWO LEVELS  3/26/2012    Procedure:FUSION CERVICAL ANTERIOR TWO LEVELS; Anterior Cervical Fusion C4-6, Anterior Cervical  Hematoma Evacuation; Surgeon:TRAVIS COFFEY; Location:[facility] OR     IR LUMBAR EPIDURAL STEROID INJECTION  3/28/2003     IRRIGATION AND DEBRIDEMENT LOWER EXTREMITY, COMBINED Left 1/10/2021    Procedure: 1.  Irrigation and excisional debridement to muscle left distal medial calf chronic wounds total area measuring 9 cm x 4 cm 2.  Irrigation and excisional debridement to fascia left medial calf chronic hematoma measuring 29 x 6.7 x 4.2 cm 3.  Primary complex wound closure left medial distal calf 9 cm in length;  Surgeon: Rod Kemp MD;  Location: [facility] OR     IRRIGATION AND  DEBRIDEMENT LOWER EXTREMITY, COMBINED Left 2021    Procedure: Evacuation of hematoma debridement of skin, subcutaneous tissue and muscle 2,400 squared centimeters, placement of negative pressure wound therapy 2,400 squared centimeters;  Surgeon: Lazaro Plascencia MD;  Location: [facility] OR     IRRIGATION AND DEBRIDEMENT SPINE, CLOSE WOUND, COMBINED  3/26/2012    Procedure:COMBINED IRRIGATION AND DEBRIDEMENT SPINE, CLOSE WOUND; Surgeon:TRAVIS COFFEY; Location:[facility] OR     OTHER SURGICAL HISTORY      DC UNLISTED ORBIT     OTHER SURGICAL HISTORY      DC UNLISTED SPINE     OTHER SURGICAL HISTORY      DC UNLISTED NECK/THORAX     OTHER SURGICAL HISTORY      (IA) DC TOTAL HIP ARTHROPLASTY     OTHER SURGICAL HISTORY      (IA) DC NEE SCOPE MED W LAT MENISCECT WWO DEBRIBE/SHAVE ANY CO     removal of cyst of back   2.5week     TONSILLECTOMY       wisdom teeth[       [facility] NONSPECIFIC PROCEDURE  1992    repair of TAA with graft     [facility] TOTAL HIP ARTHROPLASTY  2010    Left AMANDA     [facility] TOTAL HIP ARTHROPLASTY  2002    R hip replacement comp's by nerve injury with pain down into R leg, nadya below knee             Social History:     Social History     Tobacco Use     Smoking status: Never Smoker     Smokeless tobacco: Never Used   Substance Use Topics     Alcohol use: No     Comment: Stopped drinking alcohol ~             Family History:     Family History   Problem Relation Age of Onset     Cerebrovascular Disease Father          age 86, had M.I. ,diabetic also     Prostate Cancer Father      Diabetes Father      Cancer Mother          age 83 of dementia, also h/o lymphoma     Diabetes Mother      Hypertension Brother         2 brothers with hypertension & sleep apnea     Hypertension Sister         Born ~1936     Sleep Apnea Brother          age 78, Alzheimers     No Known Problems Son      No Known Problems Daughter      No Known Problems Son      Family History Negative Brother         Had 5 brothers, three still  alive as of 2019     Colon Cancer No family hx of              Immunizations:     VACCINE/DOSE   Diptheria   DPT   DTAP   HBIG   Hepatitis A   Hepatitis B   HIB   Influenza   Measles   Meningococcal   MMR   Mumps   Pneumococcal   Polio   Rubella   Small Pox   TDAP   Varicella   Zoster             Allergies:     Allergies   Allergen Reactions     Gabapentin      Severe behavioral disturbances             Medications:     Current Facility-Administered Medications   Medication     acetaminophen (TYLENOL) tablet 975 mg     Lidocaine (LIDOCARE) 4 % Patch 1 patch     lidocaine (LMX4) cream     lidocaine 1 % 0.1-1 mL     lidocaine patch in PLACE     melatonin tablet 1 mg     methocarbamol (ROBAXIN) tablet 500 mg     ondansetron (ZOFRAN-ODT) ODT tab 4 mg    Or     ondansetron (ZOFRAN) injection 4 mg     oxyCODONE (ROXICODONE) tablet 5 mg     polyethylene glycol (MIRALAX) Packet 17 g     senna-docusate (SENOKOT-S/PERICOLACE) 8.6-50 MG per tablet 1 tablet    Or     senna-docusate (SENOKOT-S/PERICOLACE) 8.6-50 MG per tablet 2 tablet     sodium chloride (PF) 0.9% PF flush 3 mL     sodium chloride (PF) 0.9% PF flush 3 mL             Review of Systems:   CV: NEGATIVE for chest pain, palpitations or peripheral edema  C: NEGATIVE for fever, chills, change in weight  E/M: NEGATIVE for ear, mouth and throat problems  R: NEGATIVE for significant cough or SOB          Physical Exam:   All vitals have been reviewed  Patient Vitals for the past 24 hrs:   BP Temp Temp src Pulse Resp SpO2   01/18/22 2030 -- -- -- -- -- 92 %   01/18/22 2015 -- -- -- -- -- 93 %   01/18/22 2000 -- -- -- -- -- 90 %   01/18/22 1945 -- -- -- -- -- 91 %   01/18/22 1930 -- -- -- -- -- 91 %   01/18/22 1900 132/78 -- -- -- 18 93 %   01/18/22 1845 -- -- -- -- -- 91 %   01/18/22 1815 -- -- -- -- -- 93 %   01/18/22 1800 -- -- -- -- -- 99 %   01/18/22 1745 -- -- -- -- -- 97 %   01/18/22 1730 -- -- -- -- -- 96 %   01/18/22 1645 -- -- -- -- -- 96 %   01/18/22 1630 -- --  -- -- -- 94 %   01/18/22 1615 -- -- -- -- -- 94 %   01/18/22 1600 122/74 -- -- 77 -- 93 %   01/18/22 1530 -- -- -- 79 -- 90 %   01/18/22 1515 -- -- -- 83 -- 100 %   01/18/22 1509 -- -- -- -- -- 97 %   01/18/22 1500 115/74 -- -- 88 -- 97 %   01/18/22 1230 -- -- -- -- -- 99 %   01/18/22 1215 (!) 148/85 -- -- 79 -- 99 %   01/18/22 1200 (!) 129/90 -- -- 78 -- 100 %   01/18/22 1145 138/86 -- -- 78 -- 99 %   01/18/22 1130 124/77 -- -- 77 -- 93 %   01/18/22 1115 139/86 -- -- 77 -- 92 %   01/18/22 1100 135/86 -- -- 78 -- 96 %   01/18/22 1003 124/74 97.5  F (36.4  C) Temporal 81 20 94 %     No intake or output data in the 24 hours ending 01/18/22 2121  Chest:   Nl resp effort, somewhat decreased but otherwise nl breath sounds     Abdomen:   soft, non-distended, non-tender, voluntary guarding absent, large ecchymosis and moderate induration left flank/buttock and thigh, old midline scar with inflammation caudally - ? Suture granuloma- chronic per pt             Data:   All laboratory data reviewed  Results for orders placed or performed during the hospital encounter of 01/18/22   CT Chest/Abdomen/Pelvis w Contrast     Status: None    Narrative    CT CHEST/ABDOMEN/PELVIS WITH CONTRAST  1/18/2022 1:14 PM    CLINICAL HISTORY: Fall, right and left flank pain, large left flank  hematoma, back pain. Coumadin.    TECHNIQUE: CT scan of the chest, abdomen, and pelvis was performed  following injection of IV contrast. Multiplanar reformats were  obtained. Dose reduction techniques were used.   CONTRAST: 100 mL Isovue-370    COMPARISON: CT chest, abdomen and pelvis 5/18/2021.    FINDINGS:   LUNGS AND PLEURA: No effusions. No pneumothorax. No acute airspace  disease.    MEDIASTINUM/AXILLAE: No acute thoracic aortic abnormality. Stable  configuration of ascending aortic endograft and aortic valve  postoperative changes. The proximal aspect of the thoracic aorta is  4.4 cm, stable. No acute mediastinal abnormality. No suspicious  lymph  nodes.    CORONARY ARTERY CALCIFICATION: Severe.    HEPATOBILIARY: Cholecystectomy. No acute liver abnormality.    PANCREAS: Normal.    SPLEEN: Normal.    ADRENAL GLANDS: Normal.    KIDNEYS/BLADDER: Incidental left-sided parapelvic renal cysts without  specific follow-up recommended. No acute renal abnormality. No  hydronephrosis. Small cyst or angiomyolipoma at the posterior right  kidney appears stable.    BOWEL: No obstruction or inflammation. Normal appendix.    PELVIC ORGANS: No acute abnormality. Prostate is 5.4 cm. Streak  artifact obscures structures.    ADDITIONAL FINDINGS: Prominent subcutaneous lobulated hematoma with  surrounding edema along the left lateral posterior flank extending  posterior and lateral to the gluteal level. A locule of blood is 5.3  cm in this region, series 3 image 238. There are other locules noted.  Small subcutaneous contusion suggested along the right posterior lower  back, series 3 image 136.    MUSCULOSKELETAL: Acute fractures involving the right posterior 11th  and 12th ribs. Acute fractures involving the right lateral L1 and L2  transverse processes. Diffuse spine degenerative changes. Right hip  arthroplasty. A few subacute healed rib fractures.  Sternotomy.      Impression    IMPRESSION:  1.  Acute fractures at the right posterior 11th and 12th ribs. Acute  fractures of the right lateral L1 and L2 transverse processes.  2.  Prominent subcutaneous hematoma at the left flank and extending to  the left posterior pelvic level. Tiny opacities at the right posterior  back may represent small subcutaneous contusion.  3.  No other acute traumatic abnormality is seen.  4.  Stable postoperative change of the ascending thoracic aorta and  aortic valve.    RAYO CHIN MD         SYSTEM ID:  [id number]   Extra Blue Top Tube     Status: None   Result Value Ref Range    Hold Specimen JIC    Extra Red Top Tube     Status: None   Result Value Ref Range    Hold Specimen JIC    Extra  Green Top (Lithium Heparin) Tube     Status: None   Result Value Ref Range    Hold Specimen JIC    Extra Purple Top Tube     Status: None   Result Value Ref Range    Hold Specimen JIC    INR     Status: Abnormal   Result Value Ref Range    INR 3.82 (H) 0.85 - 1.15   Comprehensive metabolic panel     Status: Abnormal   Result Value Ref Range    Sodium 139 133 - 144 mmol/L    Potassium 3.8 3.4 - 5.3 mmol/L    Chloride 105 94 - 109 mmol/L    Carbon Dioxide (CO2) 31 20 - 32 mmol/L    Anion Gap 3 3 - 14 mmol/L    Urea Nitrogen 19 7 - 30 mg/dL    Creatinine 0.81 0.66 - 1.25 mg/dL    Calcium 8.6 8.5 - 10.1 mg/dL    Glucose 110 (H) 70 - 99 mg/dL    Alkaline Phosphatase 89 40 - 150 U/L    AST 18 0 - 45 U/L    ALT 21 0 - 70 U/L    Protein Total 6.5 (L) 6.8 - 8.8 g/dL    Albumin 2.7 (L) 3.4 - 5.0 g/dL    Bilirubin Total 2.6 (H) 0.2 - 1.3 mg/dL    GFR Estimate >90 >60 mL/min/1.73m2   UA with Microscopic reflex to Culture     Status: Abnormal    Specimen: Urine, Midstream   Result Value Ref Range    Color Urine Orange (A) Colorless, Straw, Light Yellow, Yellow    Appearance Urine Clear Clear    Glucose Urine Negative Negative mg/dL    Bilirubin Urine Negative Negative    Ketones Urine 15  (A) Negative mg/dL    Specific Gravity Urine 1.025 1.003 - 1.035    Blood Urine Negative Negative    pH Urine 6.0 5.0 - 7.0    Protein Albumin Urine 30  (A) Negative mg/dL    Urobilinogen Urine 3.0 (A) Normal, 2.0 mg/dL    Nitrite Urine Negative Negative    Leukocyte Esterase Urine Negative Negative    Mucus Urine Present (A) None Seen /LPF    RBC Urine 2 <=2 /HPF    WBC Urine 4 <=5 /HPF    Squamous Epithelials Urine <1 <=1 /HPF    Narrative    Urine Culture not indicated   Creatinine POCT     Status: Normal   Result Value Ref Range    Creatinine POCT 0.8 0.7 - 1.3 mg/dL    GFR, ESTIMATED POCT >60 >60 mL/min/1.73m2   CBC with platelets and differential     Status: Abnormal   Result Value Ref Range    WBC Count 8.3 4.0 - 11.0 10e3/uL    RBC  Count 3.25 (L) 4.40 - 5.90 10e6/uL    Hemoglobin 10.4 (L) 13.3 - 17.7 g/dL    Hematocrit 31.8 (L) 40.0 - 53.0 %    MCV 98 78 - 100 fL    MCH 32.0 26.5 - 33.0 pg    MCHC 32.7 31.5 - 36.5 g/dL    RDW 13.8 10.0 - 15.0 %    Platelet Count 274 150 - 450 10e3/uL    % Neutrophils 71 %    % Lymphocytes 15 %    % Monocytes 11 %    % Eosinophils 2 %    % Basophils 0 %    % Immature Granulocytes 1 %    NRBCs per 100 WBC 0 <1 /100    Absolute Neutrophils 5.9 1.6 - 8.3 10e3/uL    Absolute Lymphocytes 1.2 0.8 - 5.3 10e3/uL    Absolute Monocytes 0.9 0.0 - 1.3 10e3/uL    Absolute Eosinophils 0.1 0.0 - 0.7 10e3/uL    Absolute Basophils 0.0 0.0 - 0.2 10e3/uL    Absolute Immature Granulocytes 0.1 <=0.4 10e3/uL    Absolute NRBCs 0.0 10e3/uL   Hemoglobin     Status: Abnormal   Result Value Ref Range    Hemoglobin 9.6 (L) 13.3 - 17.7 g/dL   Asymptomatic COVID-19 Virus (Coronavirus) by PCR Nasopharyngeal     Status: Abnormal    Specimen: Nasopharyngeal; Swab   Result Value Ref Range    SARS CoV2 PCR Positive (A) Negative    Narrative    Testing was performed using the claudine  SARS-CoV-2 & Influenza A/B Assay on the claudine  Angie  System.  This test should be ordered for the detection of SARS-COV-2 in individuals who meet SARS-CoV-2 clinical and/or epidemiological criteria. Test performance is unknown in asymptomatic patients.  This test is for in vitro diagnostic use under the FDA EUA for laboratories certified under CLIA to perform moderate and/or high complexity testing. This test has not been FDA cleared or approved.  A negative test does not rule out the presence of PCR inhibitors in the specimen or target RNA in concentration below the limit of detection for the assay. The possibility of a false negative should be considered if the patient's recent exposure or clinical presentation suggests COVID-19.  [facility] [email or web address] are certified under the Clinical Laboratory Improvement Amendments of 1988 (CLIA-88) as qualified to  perform moderate and/or high complexity laboratory testing.   Adult Type and Screen     Status: None   Result Value Ref Range    ABO/RH(D) A POS     Antibody Screen Negative Negative    SPECIMEN EXPIRATION DATE [id number]    Prepare red blood cells (unit)     Status: None (Preliminary result)   Result Value Ref Range    CROSSMATCH COMPATIBLE     UNIT ABO/RH A Pos     Unit Number [id number]     Unit Status Ready     Blood Component Type Red Blood Cells     Product Code J1833V44     CODING SYSTEM [id number]     UNIT TYPE ISBT 6200    Prepare red blood cells (unit)     Status: None (Preliminary result)   Result Value Ref Range    CROSSMATCH COMPATIBLE     UNIT ABO/RH A Pos     Unit Number [id number]     Unit Status Ready     Blood Component Type Red Blood Cells     Product Code [id number]     CODING SYSTEM [id number]     UNIT TYPE ISBT 6200    Extra Tube ([place] Draw)     Status: None    Narrative    The following orders were created for panel order Extra Tube ([place] Draw).  Procedure                               Abnormality         Status                     ---------                               -----------         ------                     Extra Blue Top Tube[[id number]]                              Final result               Extra Red Top Tube[[id number]]                               Final result               Extra Green Top (Lithium...[[id number]]                      Final result               Extra Purple Top Tube[[id number]]                            Final result                 Please view results for these tests on the individual orders.   CBC with platelets differential     Status: Abnormal    Narrative    The following orders were created for panel order CBC with platelets differential.  Procedure                               Abnormality         Status                     ---------                               -----------         ------                     CBC with platelets and d...[[id number]]   Abnormal            Final result                 Please view results for these tests on the individual orders.   ABO/Rh type and screen     Status: None    Narrative    The following orders were created for panel order ABO/Rh type and screen.  Procedure                               Abnormality         Status                     ---------                               -----------         ------                     Adult Type and Screen[[id number]]                            Edited Result - FINAL        Please view results for these tests on the individual orders.     CT scan of the abdomen:   reviewed  CT scan interpreted by radiologist        Attestation:  I have reviewed today's vital signs, notes, medications, labs and imaging.  Amount of time performed on this consult: 60 minutes.    Herve Montilla MD

## 2022-01-19 NOTE — PLAN OF CARE
ROOM # 231    Living Situation (if not independent, order SW consult): home with family  Facility name: NA  : wife    Activity level at baseline: indep  Activity level on admit: Indep with walker      Patient registered to observation; given Patient Bill of Rights; given the opportunity to ask questions about observation status and their plan of care.  Patient has been oriented to the observation room, bathroom and call light is in place.    Discussed discharge goals and expectations with patient/family.      -

## 2022-01-19 NOTE — PHARMACY-ADMISSION MEDICATION HISTORY
Admission medication history interview status for this patient is complete. See [facility] admission navigator for allergy information, prior to admission medications and immunization status.     Medication history interview done, indicate source(s): Patient  Medication history resources (including written lists, pill bottles, clinic record):None      Changes made to PTA medication list:  Added: none  Changed: warfarin  Reported as Not Taking: none  Removed: none    Actions taken by pharmacist (provider contacted, etc):None     Additional medication history information:None    Medication reconciliation/reorder completed by provider prior to medication history?  n   (Y/N)     For patients on insulin therapy:   Do you use sliding scale insulin based on blood sugars?   What is your pre-meal insulin coverage?    Do you typically eat three meals a day?   How many times do you check your blood glucose per day?   How many episodes of hypoglycemia do you typically have per month?   Do you have a Continuous Glucose Monitor (CGM)?      Prior to Admission medications    Medication Sig Last Dose Taking? Auth Provider   acetaminophen (TYLENOL) 500 MG tablet Take 500-1,000 mg by mouth 2 times daily  1/17/2022 at Unknown time Yes Unknown, Entered By History   aspirin (ASA) 81 MG chewable tablet Take 81 mg by mouth daily  1/17/2022 at Unknown time Yes Unknown, Entered By History   cetirizine (ZYRTEC) 10 MG tablet Take 10 mg by mouth every morning  1/17/2022 at Unknown time Yes Unknown, Entered By History   cyclobenzaprine (FLEXERIL) 10 MG tablet TAKE ONE TABLET BY MOUTH THREE TIMES DAILY AS NEEDED FOR MUSCLE SPASM. prn Yes Herve Morgan MD   diazepam (VALIUM) 5 MG tablet Take 1 tablet (5 mg) by mouth nightly as needed for anxiety 1/17/2022 at Unknown time Yes Herve Morgan MD   donepezil (ARICEPT) 10 MG tablet Take 1 tablet (10 mg) by mouth At Bedtime 1/17/2022 at Unknown time Yes Herve Morgan MD   enoxaparin ANTICOAGULANT (LOVENOX)  100 MG/ML syringe Inject 1 mL (100 mg) Subcutaneous every 12 hours Past Month Yes Herve Morgan MD   folic acid (FOLVITE) 1 MG tablet Take 5 tablets  by mouth daily 1/17/2022 at Unknown time Yes Herve Morgan MD   guanFACINE (TENEX) 1 MG tablet Take 1 tablet (1 mg) by mouth At Bedtime 1/17/2022 at Unknown time Yes Herve Morgan MD   levothyroxine (SYNTHROID/LEVOTHROID) 150 MCG tablet Take 1 tablet (150 mcg) by mouth daily 1/18/2022 at Unknown time Yes Obdulio Ingram MD   mirabegron (MYRBETRIQ) 50 MG 24 hr tablet Take 50 mg by mouth every evening 1/17/2022 at Unknown time Yes Unknown, Entered By History   pregabalin (LYRICA) 100 MG capsule Take 1 capsule (100 mg) by mouth 3 times daily Do not take if sleepy/sedated. 1/18/2022 at am Yes Herve Morgan MD   propafenone (RYTHMOL) 150 MG TABS tablet Take 1 tablet (150 mg) by mouth every 8 hours 1/18/2022 at am Yes Herve Morgan MD   simvastatin (ZOCOR) 40 MG tablet Take 1 tablet (40 mg) by mouth At Bedtime. Need to update cholesterol level before additional refills. 1/18/2022 at am Yes Herve Morgan MD   venlafaxine (EFFEXOR-XR) 75 MG 24 hr capsule Take 3 capsules (225 mg) by mouth daily 1/17/2022 at Unknown time Yes Herve Morgan MD   warfarin ANTICOAGULANT (COUMADIN) 2.5 MG tablet Take 5 mg by mouth daily 7.5mg=Wed and Sun, 5mg=ROW 1/18/2022 at 5mg Yes Unknown, Entered By History

## 2022-01-20 ENCOUNTER — TELEPHONE (OUTPATIENT)
Dept: ANTICOAGULATION | Facility: CLINIC | Age: 67
End: 2022-01-20
Payer: MEDICARE

## 2022-01-20 ENCOUNTER — PATIENT OUTREACH (OUTPATIENT)
Dept: CARE COORDINATION | Facility: CLINIC | Age: 67
End: 2022-01-20
Payer: MEDICARE

## 2022-01-20 DIAGNOSIS — I48.0 PAF (PAROXYSMAL ATRIAL FIBRILLATION) (H): Primary | ICD-10-CM

## 2022-01-20 DIAGNOSIS — Z95.2 AORTIC VALVE PROSTHESIS PRESENT: ICD-10-CM

## 2022-01-20 NOTE — PROGRESS NOTES
A&Ox4, VSS, on RA, afebrile. Pt states pain controlled with current regimen. Receiving scheduled robaxin and tylenol and lidocaine patches for pain. Large hematoma to L flank/lower abdomen area. Abdominal binder in place. Tolerating regular diet, denies any nausea. Independent with walker in room. HGB checks Q8h. Spinal and general surgery following. Xray lumbar/spine completed. Plan for pt to discharge tonight. Pt to follow up outpt for recheck of HGB and INR.

## 2022-01-20 NOTE — PROGRESS NOTES
Patient's After Visit Summary was reviewed with patient  Patient verbalized understanding of After Visit Summary, recommended follow up and was given an opportunity to ask questions.   Discharge medications sent home with patient/family: No   Discharged with daughter

## 2022-01-20 NOTE — TELEPHONE ENCOUNTER
ANTICOAGULATION  MANAGEMENT: Discharge Review    Todd S Aschoff chart reviewed for anticoagulation continuity of care    Hospital Admission on 1/18/22 for rib and cervical vertebra fracture s/p fall, Hematoma of flank.    Discharge disposition: Home    Results:    Recent labs: (last 7 days)     01/18/22  1020 01/19/22  0622   INR 3.82* 3.66*     Anticoagulation inpatient management:     reversed with Vitamin K 1 mg on1/18/22 and held warfarin due to hematoma     Anticoagulation discharge instructions:     Warfarin dosing: hold warfarin until INR on 1/20/22   Bridging: No.  Patient may need to use lovenox if INR is <2.5   INR goal change: No      Medication changes affecting anticoagulation: no    Additional factors affecting anticoagulation: Yes: COVID positive on 1/18/22 (nonsymptomatic)    HGB is also due to be checked on 1/20/22.    Plan     No adjustment to anticoagulation plan needed  Recommend to check INR on 1/20/22    Left a detailed message for patient to schedule an appointment.  Advised him that the [place] lab schedule was full, but [place] had some available appointments.    Anticoagulation Calendar updated    Amanda Wheeler RN

## 2022-01-20 NOTE — LETTER
[facility] CARE COORDINATION  [facility]  January 24, 2022    Todd S Aschoff  [address]  [place] [address]      Dear Nicko,    I am a clinic community health worker who works with Herve Morgan MD at the [facility]. I wanted to introduce myself and provide you with my contact information for you to be able to call me with any questions or concerns. Below is a description of clinic care coordination and how I can further assist you.      The clinic care coordination team is made up of a registered nurse,  and community health worker who understand the health care system. The goal of clinic care coordination is to help you manage your health and improve access to the health care system in the most efficient manner. The team can assist you in meeting your health care goals by providing education, coordinating services, strengthening the communication among your providers and supporting you with any resource needs.    Please feel free to contact me at [phone] with any questions or concerns. We are focused on providing you with the highest-quality healthcare experience possible and that all starts with you.     Sincerely,     JASMINA Fuentes. Public Health  Community Health Worker  [facility]  Clinic Care Coordination  [phone]

## 2022-01-20 NOTE — PROGRESS NOTES
Clinic Care Coordination Contact  [facility]/Voicemail       Clinical Data: Care Coordinator Outreach  Outreach attempted x 1.  Left message on patient's voicemail with call back information and requested return call.    Plan: Care Coordinator will try to reach patient again in 1-2 business days.    JASMINA Fuentes. Public Health  Community Health Worker  [facility]  Clinic Care Coordination  [phone]

## 2022-01-20 NOTE — TELEPHONE ENCOUNTER
Patient scheduled appointment for 1/21 in [place].  Left another detailed message advising him that it is important to come in today for his lab work as recommended at discharge.      Of note, [facility] lab does have one opening at this time and [facility] also has an opening.  Left this info on voicemail as well.

## 2022-01-21 ENCOUNTER — APPOINTMENT (OUTPATIENT)
Dept: GENERAL RADIOLOGY | Facility: CLINIC | Age: 67
End: 2022-01-21
Attending: EMERGENCY MEDICINE
Payer: COMMERCIAL

## 2022-01-21 ENCOUNTER — TELEPHONE (OUTPATIENT)
Dept: PEDIATRICS | Facility: CLINIC | Age: 67
End: 2022-01-21

## 2022-01-21 ENCOUNTER — HOSPITAL ENCOUNTER (EMERGENCY)
Facility: CLINIC | Age: 67
Discharge: HOME | End: 2022-01-21
Attending: EMERGENCY MEDICINE | Admitting: EMERGENCY MEDICINE
Payer: COMMERCIAL

## 2022-01-21 VITALS
RESPIRATION RATE: 18 BRPM | OXYGEN SATURATION: 90 % | DIASTOLIC BLOOD PRESSURE: 84 MMHG | WEIGHT: 306.8 LBS | TEMPERATURE: 98.4 F | HEART RATE: 93 BPM | BODY MASS INDEX: 39.39 KG/M2 | SYSTOLIC BLOOD PRESSURE: 131 MMHG

## 2022-01-21 DIAGNOSIS — Z95.2 AORTIC VALVE PROSTHESIS PRESENT: ICD-10-CM

## 2022-01-21 DIAGNOSIS — S22.49XA CLOSED FRACTURE OF MULTIPLE RIBS, UNSPECIFIED LATERALITY, INITIAL ENCOUNTER: ICD-10-CM

## 2022-01-21 DIAGNOSIS — S30.1XXA TRAUMATIC HEMATOMA OF FLANK, INITIAL ENCOUNTER: Primary | ICD-10-CM

## 2022-01-21 DIAGNOSIS — M62.81 GENERALIZED MUSCLE WEAKNESS: ICD-10-CM

## 2022-01-21 DIAGNOSIS — K59.03 DRUG-INDUCED CONSTIPATION: ICD-10-CM

## 2022-01-21 DIAGNOSIS — Z79.01 CHRONIC ANTICOAGULATION: ICD-10-CM

## 2022-01-21 LAB
ABO/RH(D): NORMAL
ALBUMIN UR-MCNC: 30 MG/DL
ANION GAP SERPL CALCULATED.3IONS-SCNC: 5 MMOL/L (ref 3–14)
ANTIBODY SCREEN: NEGATIVE
APPEARANCE UR: CLEAR
ATRIAL RATE - MUSE: 87 BPM
BASOPHILS # BLD AUTO: 0.1 10E3/UL (ref 0–0.2)
BASOPHILS NFR BLD AUTO: 1 %
BILIRUB UR QL STRIP: ABNORMAL
BUN SERPL-MCNC: 17 MG/DL (ref 7–30)
CALCIUM SERPL-MCNC: 9.3 MG/DL (ref 8.5–10.1)
CHLORIDE BLD-SCNC: 104 MMOL/L (ref 94–109)
CO2 SERPL-SCNC: 32 MMOL/L (ref 20–32)
COLOR UR AUTO: ABNORMAL
CREAT SERPL-MCNC: 0.86 MG/DL (ref 0.66–1.25)
DIASTOLIC BLOOD PRESSURE - MUSE: NORMAL MMHG
EOSINOPHIL # BLD AUTO: 0.1 10E3/UL (ref 0–0.7)
EOSINOPHIL NFR BLD AUTO: 2 %
ERYTHROCYTE [DISTWIDTH] IN BLOOD BY AUTOMATED COUNT: 14.9 % (ref 10–15)
GFR SERPL CREATININE-BSD FRML MDRD: >90 ML/MIN/1.73M2
GLUCOSE BLD-MCNC: 104 MG/DL (ref 70–99)
GLUCOSE UR STRIP-MCNC: NEGATIVE MG/DL
HCT VFR BLD AUTO: 34.8 % (ref 40–53)
HGB BLD-MCNC: 11.3 G/DL (ref 13.3–17.7)
HGB UR QL STRIP: NEGATIVE
HOLD SPECIMEN: NORMAL
IMM GRANULOCYTES # BLD: 0 10E3/UL
IMM GRANULOCYTES NFR BLD: 1 %
INR PPP: 1.63 (ref 0.85–1.15)
INTERPRETATION ECG - MUSE: NORMAL
KETONES UR STRIP-MCNC: 60 MG/DL
LEUKOCYTE ESTERASE UR QL STRIP: NEGATIVE
LYMPHOCYTES # BLD AUTO: 1 10E3/UL (ref 0.8–5.3)
LYMPHOCYTES NFR BLD AUTO: 14 %
MCH RBC QN AUTO: 32.6 PG (ref 26.5–33)
MCHC RBC AUTO-ENTMCNC: 32.5 G/DL (ref 31.5–36.5)
MCV RBC AUTO: 100 FL (ref 78–100)
MONOCYTES # BLD AUTO: 0.8 10E3/UL (ref 0–1.3)
MONOCYTES NFR BLD AUTO: 11 %
MUCOUS THREADS #/AREA URNS LPF: PRESENT /LPF
NEUTROPHILS # BLD AUTO: 5.3 10E3/UL (ref 1.6–8.3)
NEUTROPHILS NFR BLD AUTO: 71 %
NITRATE UR QL: NEGATIVE
NRBC # BLD AUTO: 0 10E3/UL
NRBC BLD AUTO-RTO: 0 /100
P AXIS - MUSE: 60 DEGREES
PH UR STRIP: 7 [PH] (ref 5–7)
PLATELET # BLD AUTO: 366 10E3/UL (ref 150–450)
POTASSIUM BLD-SCNC: 4.1 MMOL/L (ref 3.4–5.3)
PR INTERVAL - MUSE: 152 MS
QRS DURATION - MUSE: 86 MS
QT - MUSE: 384 MS
QTC - MUSE: 462 MS
R AXIS - MUSE: -30 DEGREES
RBC # BLD AUTO: 3.47 10E6/UL (ref 4.4–5.9)
RBC URINE: 3 /HPF
SODIUM SERPL-SCNC: 141 MMOL/L (ref 133–144)
SP GR UR STRIP: 1.03 (ref 1–1.03)
SPECIMEN EXPIRATION DATE: NORMAL
SYSTOLIC BLOOD PRESSURE - MUSE: NORMAL MMHG
T AXIS - MUSE: 72 DEGREES
TROPONIN I SERPL HS-MCNC: 14 NG/L
UROBILINOGEN UR STRIP-MCNC: 4 MG/DL
VENTRICULAR RATE- MUSE: 87 BPM
WBC # BLD AUTO: 7.4 10E3/UL (ref 4–11)
WBC URINE: 7 /HPF

## 2022-01-21 PROCEDURE — 81001 URINALYSIS AUTO W/SCOPE: CPT | Performed by: EMERGENCY MEDICINE

## 2022-01-21 PROCEDURE — 85610 PROTHROMBIN TIME: CPT | Performed by: EMERGENCY MEDICINE

## 2022-01-21 PROCEDURE — 86901 BLOOD TYPING SEROLOGIC RH(D): CPT | Performed by: EMERGENCY MEDICINE

## 2022-01-21 PROCEDURE — 93005 ELECTROCARDIOGRAM TRACING: CPT

## 2022-01-21 PROCEDURE — 36415 COLL VENOUS BLD VENIPUNCTURE: CPT | Mod: 59 | Performed by: EMERGENCY MEDICINE

## 2022-01-21 PROCEDURE — 87086 URINE CULTURE/COLONY COUNT: CPT | Performed by: EMERGENCY MEDICINE

## 2022-01-21 PROCEDURE — 36415 COLL VENOUS BLD VENIPUNCTURE: CPT | Performed by: EMERGENCY MEDICINE

## 2022-01-21 PROCEDURE — 82310 ASSAY OF CALCIUM: CPT | Performed by: EMERGENCY MEDICINE

## 2022-01-21 PROCEDURE — 99285 EMERGENCY DEPT VISIT HI MDM: CPT | Mod: 25

## 2022-01-21 PROCEDURE — 84484 ASSAY OF TROPONIN QUANT: CPT | Performed by: EMERGENCY MEDICINE

## 2022-01-21 PROCEDURE — 71045 X-RAY EXAM CHEST 1 VIEW: CPT

## 2022-01-21 PROCEDURE — 85025 COMPLETE CBC W/AUTO DIFF WBC: CPT | Performed by: EMERGENCY MEDICINE

## 2022-01-21 RX ORDER — LIDOCAINE 40 MG/G
CREAM TOPICAL
Status: DISCONTINUED | OUTPATIENT
Start: 2022-01-21 | End: 2022-01-21 | Stop reason: HOSPADM

## 2022-01-21 RX ORDER — DOCUSATE SODIUM 100 MG/1
100 CAPSULE, LIQUID FILLED ORAL 3 TIMES DAILY PRN
Qty: 20 CAPSULE | Refills: 0 | Status: SHIPPED | OUTPATIENT
Start: 2022-01-21 | End: 2022-01-21

## 2022-01-21 RX ORDER — DOCUSATE SODIUM 100 MG/1
100 CAPSULE, LIQUID FILLED ORAL 3 TIMES DAILY PRN
Qty: 20 CAPSULE | Refills: 0 | Status: SHIPPED | OUTPATIENT
Start: 2022-01-21 | End: 2022-09-02

## 2022-01-21 ASSESSMENT — ENCOUNTER SYMPTOMS
COUGH: 0
VOMITING: 0
DIZZINESS: 1
CONSTIPATION: 1
FEVER: 0
FATIGUE: 1

## 2022-01-21 NOTE — TELEPHONE ENCOUNTER
Left message on patient's phone to call the INR Clinic at [phone].     Called spouse, (C2C on file). Lissy states patient overslept this morning and patient told her he felt like he was going to pass out. Lissy stated patient is having a lot of pain with his fractured ribs and back. Lissy was advised to call PCP regarding how patient was feeling. Discussed if hgb was low, that could make patient feel lightheaded. A lab appointment was scheduled at the [place] lab for this afternoon.    Called Lissy back, she states that patient is sleeping, daughter did check on patient and reported to Lissy that patient was very pale. Lissy stated that she is going to let him sleep a few more minutes and then call an ambulance because patient is not able to walk down the stairs and they live in a split level home.   Lucina Andrew RN, BSN  Anticoagulation Clinic

## 2022-01-21 NOTE — TELEPHONE ENCOUNTER
Patient is at ED, closing this encounter. Will need to open a new encounter for the ED follow-up for today.  Lucina Andrew RN, BSN  Anticoagulation Clinic

## 2022-01-21 NOTE — ED PROVIDER NOTES
History   Chief Complaint:  Dizziness       HPI   Todd S Aschoff is a 66 year old male anticoagulated on Coumadin with history of atrial fibrillation, aortic dissection with valve replacement, and hyperlipidemia who presents with dizziness. Per chart review, on 1/12/22 the patient fell backwards down the stairs and sustained a hematoma to his right buttock and rib and transverse process fractures. He was admitted 1/18/22-1/19/22 and discharged with lidocaine patches, Robaxin, Oxycodone, and Senna-docusate. Of note, he was also found to be Covid positive on 1/18/22. He reports pain at a 7-8/10. He was supposed to come in today to have his INR and HGB checked but feels too tired and dizzy. He states overall he just feels very fatigued and wants to sleep all the time. He hasn't had any bowel movements since the injury. His urinary habits have been normal. He also reports slight runny nose. He denies cough, fever, vomiting.     Review of Systems   Constitutional: Positive for fatigue. Negative for fever.   Respiratory: Negative for cough.    Gastrointestinal: Positive for constipation. Negative for vomiting.   Neurological: Positive for dizziness.   All other systems reviewed and are negative.        Allergies:  Blood Transfusion Related (Informational Only)  Gabapentin    Medications:  Robaxin  Oxycodone  Senna-docusate  Zocor  Valium  Folic acid  Aricept  Tenex  Lyrica  Rythmol  Effexor-XR  Levothyroxine  Aspirin 81 mg  Zyrtex  Mirabegron  Coumadin    Past Medical History:     Alcohol dependence  Anemia  Atrial fibrillation  Benign prostatic hypertrophy  Depression  Dissection of aorta  Aortic valve prosthesis  Hyperlipidemia   Hypothyroidism  Osteoarthritis  Sciatica  Spinal stenosis  Strabismus  Tourette syndrome  Lumbar radiculopathy  Hypotension  Left ho-paresthesias    Past Surgical History:    Aortic valve replacement  Cholecystectomy  Close secondary wound lower extremity  Decompression lumbar one  level  Decompression, fusion cervical anterior one level  Elbow surgery  Explore spine, remove hardware  Fusion cervical anterior two levels  IR lumbar epidural steroid injection   I&D lower extremity x2  I&D spine, close wound, combined  Hip arthroplasty bilateral  Tonsillectomy   Repair of TAA with graft    Family History:    Father- myocardial infarction, prostate cancer  Mother- dementia, diabetes mellitus   Brother- hypertension, alzheimer's disease  Sister- hypertension     Social History:  Presents alone      Physical Exam     Patient Vitals for the past 24 hrs:   BP Temp Temp src Pulse Resp SpO2 Weight   01/21/22 1330 -- -- -- -- -- 94 % --   01/21/22 1315 -- -- -- -- -- 100 % --   01/21/22 1300 (!) 144/85 -- -- 92 -- 90 % --   01/21/22 1245 124/86 -- -- 89 -- 92 % --   01/21/22 1230 (!) 141/91 -- -- 85 -- 93 % --   01/21/22 1154 (!) 143/92 98.4  F (36.9  C) Oral 88 18 99 % 139.2 kg (306 lb 12.8 oz)       Physical Exam  General: The patient is alert, in no respiratory distress.    HENT: Mucous membranes moist.    Cardiovascular: Regular rate and rhythm. Good pulses in all four extremities. Normal capillary refill and skin turgor. Valve replacement that is audible.    Respiratory: Lungs are clear. No nasal flaring. No retractions. No wheezing, no crackles.    Gastrointestinal: Abdomen soft. No guarding, no rebound. No palpable hernias.     Musculoskeletal: No gross deformity. Pain with movement.     Skin: No rashes or petechiae. Pallor. Ecchymosis over the right scapula, transverse on the right flank, and over the left lower quadrant of his abdomen extending to the right lower quadrant and down to his distal left thigh wrapping around the left flank.     Neurologic: The patient is alert and oriented x3. GCS 15. No testable cranial nerve deficit. Follows commands with clear and appropriate speech. Gives appropriate answers. Good strength in all extremities. No gross neurologic deficit. Gross sensation  intact. Pupils are round and reactive. No meningismus.     Lymphatic: No cervical adenopathy. No lower extremity swelling.    Psychiatric: The patient is non-tearful.     Emergency Department Course   ECG  ECG obtained at 1237, ECG read at 1245  Dizziness   No prior EKG for comparison.  Rate 87 bpm. ND interval 152 ms. QRS duration 86 ms. QT/QTc 384/462 ms. P-R-T axes 60 -30 72. Sinus rhythm with occasional premature ventricular complexes. Left axis deviation.      Imaging:  XR Chest Port 1 View   Final Result   IMPRESSION: Prior median sternotomy. No airspace consolidation,   pneumothorax, or pleural effusion.      RONA STAPLETON MD            SYSTEM ID:  [id number]        Report per radiology    Laboratory:  Labs Ordered and Resulted from Time of ED Arrival to Time of ED Departure   INR - Abnormal       Result Value    INR 1.63 (*)    BASIC METABOLIC PANEL - Abnormal    Sodium 141      Potassium 4.1      Chloride 104      Carbon Dioxide (CO2) 32      Anion Gap 5      Urea Nitrogen 17      Creatinine 0.86      Calcium 9.3      Glucose 104 (*)     GFR Estimate >90     CBC WITH PLATELETS AND DIFFERENTIAL - Abnormal    WBC Count 7.4      RBC Count 3.47 (*)     Hemoglobin 11.3 (*)     Hematocrit 34.8 (*)           MCH 32.6      MCHC 32.5      RDW 14.9      Platelet Count 366      % Neutrophils 71      % Lymphocytes 14      % Monocytes 11      % Eosinophils 2      % Basophils 1      % Immature Granulocytes 1      NRBCs per 100 WBC 0      Absolute Neutrophils 5.3      Absolute Lymphocytes 1.0      Absolute Monocytes 0.8      Absolute Eosinophils 0.1      Absolute Basophils 0.1      Absolute Immature Granulocytes 0.0      Absolute NRBCs 0.0     ROUTINE UA WITH MICROSCOPIC - Abnormal    Color Urine Orange (*)     Appearance Urine Clear      Glucose Urine Negative      Bilirubin Urine Small (*)     Ketones Urine 60  (*)     Specific Gravity Urine 1.030      Blood Urine Negative      pH Urine 7.0      Protein Albumin  Urine 30  (*)     Urobilinogen Urine 4.0 (*)     Nitrite Urine Negative      Leukocyte Esterase Urine Negative      Mucus Urine Present (*)     RBC Urine 3 (*)     WBC Urine 7 (*)    TROPONIN I - Normal    Troponin I High Sensitivity 14     TYPE AND SCREEN, ADULT   URINE CULTURE   ABO/RH TYPE AND SCREEN      Emergency Department Course:    Reviewed:  I reviewed nursing notes, vitals and past medical history    Assessments:  1218 I obtained history and examined the patient as noted above.   1419 I rechecked the patient and explained findings.     Disposition:  The patient was discharged to home.     Impression & Plan     CMS Diagnoses: None    Medical Decision Making:  I reviewed the patient's recent admission to the hospital.  He is chronically anticoagulated and had suffered rib fracture and transverse process fractures.  I was concerned that his fatigue and complaints of tiredness could be secondary to bleeding infection arrhythmia pneumonia or other processes.  His labs here are reassuring.  His hemoglobin is actually up from previous.  His INR is slightly low he been holding his Coumadin after the trauma.  I suggest he check with his Coumadin clinic with would likely recommend restarting his Coumadin and he is low and has a history of aortic valve.  The patient is not anemic here.  His urine does show 7 white blood cells but after discussion of starting antibiotics or culturing him we will culture his urine and call him if it is positive.  The patient otherwise is stable his color is good and he was discharged home in good condition a stool softener.    Diagnosis:    ICD-10-CM    1. Generalized muscle weakness  M62.81    2. Closed fracture of multiple ribs, unspecified laterality, initial encounter  S22.49XA    3. Chronic anticoagulation  Z79.01    4. Drug-induced constipation  K59.03        Discharge Medications:  New Prescriptions    DOCUSATE SODIUM (COLACE) 100 MG CAPSULE    Take 1 capsule (100 mg) by mouth 3  times daily as needed for constipation       Scribe Disclosure:  I, Nay Johnsonumacher, am serving as a scribe at 12:08 PM on 1/21/2022 to document services personally performed by Alex Hernandez MD based on my observations and the provider's statements to me.            Alex Hernandez MD  01/21/22 1532

## 2022-01-21 NOTE — ED NOTES
I was asked to evaluate a prescription for this patient.  My colleague had discharged him with a prescription for Colace, unfortunately, at the time of discharge the patient got blood on it.  I canceled the order in epic and represcribed the same dose (100 mg Colace 3 times daily as needed constipation, quantity 20 tabs).     Mikal Marshall,   01/21/22 2016

## 2022-01-21 NOTE — ED TRIAGE NOTES
Patient arrives via EMS, patient fell 1.5 weeks ago and was evaluated this past Tuesday for rib pain, patient was found to have broken ribs on his right side. Today patient was suppose to have his INR checked but patient was to dizzy to leave his home. Patient alert and orientated X 4

## 2022-01-21 NOTE — TELEPHONE ENCOUNTER
Pt called with lab information and voicemail left. Left direct number if he needs help scheduling a lab appointment of a hospital Follow-up appointment.     oSco Orlando, EMT at 9:57 AM on January 21, 2022  [facility] Guide  [phone]          Pt call and was supposed have labs drawn today after a fall and hospitalization with cervical fx and multi rib fx. At that time 1/18/22 He did test positive for COVID. Pt would like to know if he should get labs done now or if he should wait. If he can wait wants to know when it is safe to do so. Routing to PCP for advice.    Soco Orlando, EMT at 9:35 AM on January 21, 2022  [facility] Guide  [phone]

## 2022-01-21 NOTE — TELEPHONE ENCOUNTER
Can safely come in Monday for labs, or Tuesday.  Sooner if he is having any worsening of his hematoma or any new bleeding.    Herve Morgan MD  Internal Medicine and Pediatrics

## 2022-01-23 LAB — BACTERIA UR CULT: NORMAL

## 2022-01-24 ENCOUNTER — TELEPHONE (OUTPATIENT)
Dept: ANTICOAGULATION | Facility: CLINIC | Age: 67
End: 2022-01-24
Payer: MEDICARE

## 2022-01-24 DIAGNOSIS — Z95.2 AORTIC VALVE PROSTHESIS PRESENT: ICD-10-CM

## 2022-01-24 DIAGNOSIS — I48.0 PAF (PAROXYSMAL ATRIAL FIBRILLATION) (H): Primary | ICD-10-CM

## 2022-01-24 NOTE — PROGRESS NOTES
Clinic Care Coordination Contact  [facility]/Voicemail       Clinical Data: Care Coordinator Outreach  Outreach attempted x 2.  Left message on patient's voicemail with call back information and requested return call.    Plan: Care Coordinator will send care coordination introduction letter with care coordinator contact information and explanation of care coordination services via [employer]. Care Coordinator will do no further outreaches at this time.    JASMINA Fuentes. Public Health  Community Health Worker  [facility]  Clinic Care Coordination  [phone]

## 2022-01-24 NOTE — TELEPHONE ENCOUNTER
Left message on patient's cell phone to take 5 mg warfarin this evening. Left message on spouse's cell phone with dosing instruction.    Per Telephone Encounter 1/24/22, Dr Morgan states patient can resume warfarin today with no bridging.    Lucina Andrew RN, BSN  Anticoagulation Clinic

## 2022-01-24 NOTE — TELEPHONE ENCOUNTER
Telephone call placed to patient, left a voice message with request for return call.    When the patient calls back:  -relay below note from Dr. Morgan to the patient.     [place] RN on 1/24/2022 at 1:26 PM

## 2022-01-24 NOTE — TELEPHONE ENCOUNTER
Patient was seen in ED 1/21/22, Hgb increased, has hematoma from fall 1/18/22.     Patient had been holding warfarin, ok to resume?     Do you want patient to bridge with Lovenox?      Lucina Andrew RN, BSN  Anticoagulation Clinic

## 2022-01-24 NOTE — TELEPHONE ENCOUNTER
ANTICOAGULATION  MANAGEMENT: Discharge Review    Todd S Aschoff chart reviewed for anticoagulation continuity of care    Emergency room visit on 1/21/22 for Dizziness, fatigue.    Discharge disposition: Home    Results:    Recent labs: (last 7 days)     01/18/22  1020 01/19/22  0622 01/21/22  1313   INR 3.82* 3.66* 1.63*     Anticoagulation inpatient management:     not applicable     Anticoagulation discharge instructions:     Warfarin dosing: hold Patient was instructed to hold warfarin upon 1/19/22 discharge   Bridging: No   INR goal change: No      Medication changes affecting anticoagulation: No    Additional factors affecting anticoagulation: Yes:  on 1/12/22 the patient fell backwards down the stairs and sustained a hematoma to his right buttock and rib and transverse process fractures. COVID + 1/18/22    Plan     Recommend to check INR on 1/27/22    Left a detailed message for patient and spouse to return call to the INR Clinic    Anticoagulation Calendar updated    Lucina Andrew RN

## 2022-01-26 ENCOUNTER — LAB (OUTPATIENT)
Dept: LAB | Facility: CLINIC | Age: 67
End: 2022-01-26
Payer: COMMERCIAL

## 2022-01-26 DIAGNOSIS — S30.1XXA TRAUMATIC HEMATOMA OF FLANK, INITIAL ENCOUNTER: ICD-10-CM

## 2022-01-26 DIAGNOSIS — Z95.2 AORTIC VALVE PROSTHESIS PRESENT: ICD-10-CM

## 2022-01-26 LAB
BASOPHILS # BLD AUTO: 0 10E3/UL (ref 0–0.2)
BASOPHILS NFR BLD AUTO: 1 %
EOSINOPHIL # BLD AUTO: 0.3 10E3/UL (ref 0–0.7)
EOSINOPHIL NFR BLD AUTO: 5 %
ERYTHROCYTE [DISTWIDTH] IN BLOOD BY AUTOMATED COUNT: 17 % (ref 10–15)
HCT VFR BLD AUTO: 37.1 % (ref 40–53)
HGB BLD-MCNC: 12.1 G/DL (ref 13.3–17.7)
LYMPHOCYTES # BLD AUTO: 1.5 10E3/UL (ref 0.8–5.3)
LYMPHOCYTES NFR BLD AUTO: 26 %
MCH RBC QN AUTO: 33.1 PG (ref 26.5–33)
MCHC RBC AUTO-ENTMCNC: 32.6 G/DL (ref 31.5–36.5)
MCV RBC AUTO: 101 FL (ref 78–100)
MONOCYTES # BLD AUTO: 1 10E3/UL (ref 0–1.3)
MONOCYTES NFR BLD AUTO: 17 %
NEUTROPHILS # BLD AUTO: 2.9 10E3/UL (ref 1.6–8.3)
NEUTROPHILS NFR BLD AUTO: 52 %
PLATELET # BLD AUTO: 448 10E3/UL (ref 150–450)
RBC # BLD AUTO: 3.66 10E6/UL (ref 4.4–5.9)
WBC # BLD AUTO: 5.7 10E3/UL (ref 4–11)

## 2022-01-26 PROCEDURE — 85025 COMPLETE CBC W/AUTO DIFF WBC: CPT

## 2022-01-26 PROCEDURE — 85610 PROTHROMBIN TIME: CPT

## 2022-01-26 PROCEDURE — 36415 COLL VENOUS BLD VENIPUNCTURE: CPT

## 2022-01-26 NOTE — TELEPHONE ENCOUNTER
Called patient, unable to leave a message, voicemail is full.    Left message on Lissy's phone to either call or have patient return call.    Lucina Andrew RN, BSN  Anticoagulation Clinic

## 2022-01-26 NOTE — TELEPHONE ENCOUNTER
Called patient-he started warfarin 2 days ago.  Advised that is right, glad he did that, per below.  Lynne Coto RN  Message handled by Nurse Triage.

## 2022-01-26 NOTE — TELEPHONE ENCOUNTER
Returned call to patient. Patient reports he only took 5 mg warfarin 1/25/22, will take 7.5 mg warfarin 1/26/22.    Patient states he does not have access to a car during the day. Patient states he called to schedule an INR for evening and was told to go to Urgent Care and they would do the INR. I told patient I was not sure about that but if he did go, he would get a call from INR Nurse 1/27/22. Also discussed with patient that it is important to answer the INR  Nurse calls or call us back, especially when his INR is not in range.  Patient agreed.    Lucina Andrew, RN, BSN  Anticoagulation Clinic

## 2022-01-27 ENCOUNTER — ANTICOAGULATION THERAPY VISIT (OUTPATIENT)
Dept: ANTICOAGULATION | Facility: CLINIC | Age: 67
End: 2022-01-27
Payer: MEDICARE

## 2022-01-27 DIAGNOSIS — I48.0 PAF (PAROXYSMAL ATRIAL FIBRILLATION) (H): Primary | ICD-10-CM

## 2022-01-27 DIAGNOSIS — Z95.2 AORTIC VALVE PROSTHESIS PRESENT: ICD-10-CM

## 2022-01-27 LAB — INR PPP: 3.83 (ref 0.85–1.15)

## 2022-01-27 NOTE — PROGRESS NOTES
ANTICOAGULATION MANAGEMENT     Todd S Aschoff 66 year old male is on warfarin with supratherapeutic INR result. (Goal INR 2.5-3.5)    Recent labs: (last 7 days)     01/26/22  1826   INR 3.83*       ASSESSMENT     Source(s): Chart Review and Patient/Caregiver Call       Warfarin doses taken: Warfarin taken as instructed, patient states he held is warfarin as instructed    Diet: No new diet changes identified    New illness, injury, or hospitalization: Yes: Patient fell 1/18/22, was in the hospital overnight, fractured ribs, back and hematoma. Patient back to ED 1/21/22 for dizziness    Medication/supplement changes: Oxycodone stopped on 1/26/22 No interaction anticipated, has been taking 4-6 of the 625 mg Tylenol (2500 - 3750 mg daily)    Signs or symptoms of bleeding or clotting: Yes: has hematoma from fall 1/18/22, states it is not decreasing in size, staying the same size    Previous INR: Subtherapeutic at ED 1/21/22    Additional findings: None     PLAN     Recommended plan for temporary change(s) affecting INR     Dosing Instructions: Take 2.5 mg warfarin 1/21/22 and 5 mg warfarin 1/28, 1/29, and 1/30/22.  with next INR in 4 days       Summary  As of 1/27/2022    Full warfarin instructions:  1/27: 2.5 mg; 1/28: 5 mg; 1/30: 5 mg; Otherwise 5 mg every Mon, Wed, Sat; 7.5 mg all other days   Next INR check:  1/31/2022             Telephone call with Nicko who agrees to plan and repeated back plan correctly    Lab visit scheduled    Education provided: Please call back if any changes to your diet, medications or how you've been taking warfarin and Contact [phone]  with any changes, questions or concerns.     Plan made with [facility] Pharmacist, Nolvia Andrew RN  Anticoagulation Clinic  1/27/2022    _______________________________________________________________________     Anticoagulation Episode Summary     Current INR goal:  2.5-3.5   TTR:  61.4 % (11.3 mo)   Target end date:  Indefinite   Send  INR reminders to:  [facility]    Indications    PAF (paroxysmal atrial fibrillation) (H) [I48.0]  Long term current use of anticoagulant therapy (Resolved) [Z79.01]  Mechanical AV Replacement 1992 -- on Warfarin  [Z95.2]           Comments:           Anticoagulation Care Providers     Provider Role Specialty Phone number    Obdulio Ingram MD Referring Internal Medicine [phone]

## 2022-01-31 ENCOUNTER — LAB (OUTPATIENT)
Dept: LAB | Facility: CLINIC | Age: 67
End: 2022-01-31
Payer: COMMERCIAL

## 2022-01-31 ENCOUNTER — ANTICOAGULATION THERAPY VISIT (OUTPATIENT)
Dept: PEDIATRICS | Facility: CLINIC | Age: 67
End: 2022-01-31

## 2022-01-31 DIAGNOSIS — F33.1 MAJOR DEPRESSIVE DISORDER, RECURRENT EPISODE, MODERATE (H): ICD-10-CM

## 2022-01-31 DIAGNOSIS — I48.0 PAF (PAROXYSMAL ATRIAL FIBRILLATION) (H): Primary | ICD-10-CM

## 2022-01-31 DIAGNOSIS — Z95.2 AORTIC VALVE PROSTHESIS PRESENT: ICD-10-CM

## 2022-01-31 DIAGNOSIS — M62.830 BACK MUSCLE SPASM: ICD-10-CM

## 2022-01-31 LAB — INR BLD: 3.3 (ref 0.9–1.1)

## 2022-01-31 PROCEDURE — 36416 COLLJ CAPILLARY BLOOD SPEC: CPT

## 2022-01-31 PROCEDURE — 85610 PROTHROMBIN TIME: CPT

## 2022-01-31 RX ORDER — GUANFACINE 1 MG/1
1 TABLET ORAL AT BEDTIME
Qty: 90 TABLET | Refills: 0 | Status: SHIPPED | OUTPATIENT
Start: 2022-01-31 | End: 2022-05-03

## 2022-01-31 RX ORDER — DIAZEPAM 5 MG
5 TABLET ORAL
Qty: 30 TABLET | Refills: 0 | Status: SHIPPED | OUTPATIENT
Start: 2022-01-31 | End: 2022-03-11

## 2022-01-31 NOTE — PROGRESS NOTES
Anticoagulation Management    Unable to reach Nicko today.    Today's INR result of 3.3 is therapeutic (goal INR of 2.5-3.5).  Result received from: Clinic Lab    Follow up required to confirm warfarin dose taken and assess for changes    Left message to continue current dose of warfarin 5 mg tonight. Request call back for assessment.      Anticoagulation clinic to follow up    Britta Snowden RN

## 2022-01-31 NOTE — TELEPHONE ENCOUNTER
Routing refill request to provider for review/approval because:  Lizzeth given x1 and patient did not follow up, please advise  Drug not on the [facility] refill protocol   Patient needs to be seen because:  due for 6 month follow up    Yadi Robert RN on 1/31/2022 at 10:55 AM

## 2022-02-01 ENCOUNTER — TELEPHONE (OUTPATIENT)
Dept: PEDIATRICS | Facility: CLINIC | Age: 67
End: 2022-02-01
Payer: MEDICARE

## 2022-02-01 NOTE — TELEPHONE ENCOUNTER
Attempted to reach patient by phone.  Left message to call INR Clinic. [phone].    Nova John RN  [facility]  If returning call, transfer to Nova [phone] or route to VINICIO GUERRERO [[id number]]

## 2022-02-01 NOTE — TELEPHONE ENCOUNTER
Spoke to patient by phone.  See Anticoagulation Therapy Visit note from 1/31/22    Nova John RN  .

## 2022-02-01 NOTE — PROGRESS NOTES
Attempted to reach patient by phone.  Left message to call INR Clinic. [phone].    Nova John RN  [facility]  If returning call, transfer to Nova [phone] or route to VINICIO GUERRERO [[id number]]

## 2022-02-01 NOTE — PROGRESS NOTES
ANTICOAGULATION MANAGEMENT     Todd S Aschoff 66 year old male is on warfarin with therapeutic INR result. (Goal INR 2.5-3.5)    Recent labs: (last 7 days)     01/31/22  1432   INR 3.3*       ASSESSMENT     Source(s): Chart Review and Patient/Caregiver Call       Warfarin doses taken: Warfarin taken as instructed    Diet: No new diet changes identified    New illness, injury, or hospitalization: Yes: 1/18/22 had a fall with multiple rib fractures. Hematoma of right flank and buttock.    Medication/supplement changes: None noted    Signs or symptoms of bleeding or clotting: Yes: Bruising on right flank and leg.    Previous INR: Supratherapeutic    Additional findings: None     PLAN     Recommended plan for temporary change(s) affecting INR     Dosing Instructions: 5 mg daily until Friday. Take 7.5 mg Fri, 5mg Sat, 7.5 mg Sun with next INR in 1 week on Monday 2/7/22.       Summary  As of 1/31/2022    Full warfarin instructions:  2/1: 5 mg; 2/3: 5 mg; Otherwise 5 mg every Mon, Wed, Sat; 7.5 mg all other days   Next INR check:  2/7/2022             Telephone call with Nicko who agrees to plan and repeated back plan correctly    Lab visit scheduled    Education provided: Importance of consistent vitamin K intake, Monitoring for bleeding signs and symptoms and Contact [phone]  with any changes, questions or concerns.     Plan made per [facility] anticoagulation protocol    Nova John RN  Anticoagulation Clinic  2/1/2022    _______________________________________________________________________     Anticoagulation Episode Summary     Current INR goal:  2.5-3.5   TTR:  61.1 % (11.5 mo)   Target end date:  Indefinite   Send INR reminders to:  AIDA GUERRERO    Indications    PAF (paroxysmal atrial fibrillation) (H) [I48.0]  Long term current use of anticoagulant therapy (Resolved) [Z79.01]  Mechanical AV Replacement 1992 -- on Warfarin  [Z95.2]           Comments:           Anticoagulation Care Providers     Provider  Role Specialty Phone number    Obdulio Ingram MD Referring Internal Medicine [phone]

## 2022-02-01 NOTE — TELEPHONE ENCOUNTER
Reason for Call:  Other call back    Detailed comments: Patient returning [facility] call - patient states he has no missed call on his phone number from yesterday or today    Phone Number Patient can be reached at: Home number on file [phone] (home)    Best Time: any    Can we leave a detailed message on this number? YES    Call taken on 2/1/2022 at 10:47 AM by Mary Locke

## 2022-02-07 ENCOUNTER — ANTICOAGULATION THERAPY VISIT (OUTPATIENT)
Dept: ANTICOAGULATION | Facility: CLINIC | Age: 67
End: 2022-02-07

## 2022-02-07 ENCOUNTER — LAB (OUTPATIENT)
Dept: LAB | Facility: CLINIC | Age: 67
End: 2022-02-07
Payer: COMMERCIAL

## 2022-02-07 DIAGNOSIS — Z95.2 AORTIC VALVE PROSTHESIS PRESENT: ICD-10-CM

## 2022-02-07 DIAGNOSIS — I48.0 PAF (PAROXYSMAL ATRIAL FIBRILLATION) (H): Primary | ICD-10-CM

## 2022-02-07 LAB — INR BLD: 4.3 (ref 0.9–1.1)

## 2022-02-07 PROCEDURE — 36416 COLLJ CAPILLARY BLOOD SPEC: CPT

## 2022-02-07 PROCEDURE — 85610 PROTHROMBIN TIME: CPT

## 2022-02-07 NOTE — PROGRESS NOTES
ANTICOAGULATION MANAGEMENT     Todd S Aschoff 66 year old male is on warfarin with supratherapeutic INR result. (Goal INR 2.5-3.5)    Recent labs: (last 7 days)     02/07/22  1119   INR 4.3*       ASSESSMENT     Source(s): Chart Review and Patient/Caregiver Call       Warfarin doses taken: Warfarin taken as instructed    Diet: Increased greens/vitamin K in diet; ongoing change    New illness, injury, or hospitalization: No. 1/18/22 had a fall with multiple rib fractures. Hematoma of right flank and buttock.    Medication/supplement changes: None noted    Signs or symptoms of bleeding or clotting: Bruising on right flank and leg has decreased.    Previous INR: Therapeutic last visit; previously outside of goal range    Additional findings: None     PLAN     Recommended plan for ongoing change(s) affecting INR     Dosing Instructions: Partial hold then Decrease your warfarin dose (16.7% change) with next INR in 8 days       Summary  As of 2/7/2022    Full warfarin instructions:  2/7: 2.5 mg; Otherwise 7.5 mg every Sun; 5 mg all other days   Next INR check:  2/15/2022             Telephone call with Nicko who agrees to plan and repeated back plan correctly    Lab visit scheduled after derm appointment on 2/15/22    Education provided: Goal range and significance of current result and Monitoring for bleeding signs and symptoms    Plan made per [facility] anticoagulation protocol    Nova John RN  Anticoagulation Clinic  2/7/2022    _______________________________________________________________________     Anticoagulation Episode Summary     Current INR goal:  2.5-3.5   TTR:  60.3 % (11.7 mo)   Target end date:  Indefinite   Send INR reminders to:  AIDA GUERRERO    Indications    PAF (paroxysmal atrial fibrillation) (H) [I48.0]  Long term current use of anticoagulant therapy (Resolved) [Z79.01]  Mechanical AV Replacement 1992 -- on Warfarin  [Z95.2]           Comments:           Anticoagulation Care Providers      Provider Role Specialty Phone number    Obdulio Ingram MD Referring Internal Medicine [phone]

## 2022-02-15 ENCOUNTER — LAB (OUTPATIENT)
Dept: LAB | Facility: CLINIC | Age: 67
End: 2022-02-15
Payer: COMMERCIAL

## 2022-02-15 ENCOUNTER — ANTICOAGULATION THERAPY VISIT (OUTPATIENT)
Dept: ANTICOAGULATION | Facility: CLINIC | Age: 67
End: 2022-02-15

## 2022-02-15 ENCOUNTER — OFFICE VISIT (OUTPATIENT)
Dept: DERMATOLOGY | Facility: CLINIC | Age: 67
End: 2022-02-15
Payer: COMMERCIAL

## 2022-02-15 DIAGNOSIS — Z95.2 AORTIC VALVE PROSTHESIS PRESENT: ICD-10-CM

## 2022-02-15 DIAGNOSIS — D48.5 NEOPLASM OF UNCERTAIN BEHAVIOR OF SKIN: Primary | ICD-10-CM

## 2022-02-15 DIAGNOSIS — I48.0 PAF (PAROXYSMAL ATRIAL FIBRILLATION) (H): Primary | ICD-10-CM

## 2022-02-15 LAB — INR BLD: 4.1 (ref 0.9–1.1)

## 2022-02-15 PROCEDURE — 85610 PROTHROMBIN TIME: CPT

## 2022-02-15 PROCEDURE — 36416 COLLJ CAPILLARY BLOOD SPEC: CPT

## 2022-02-15 PROCEDURE — 99203 OFFICE O/P NEW LOW 30 MIN: CPT | Performed by: DERMATOLOGY

## 2022-02-15 NOTE — PROGRESS NOTES
Dermatology Clinic Note    Dermatology Problem List:  1. Papules within scar from spinal surgery in 2017      Assessment and Plan:    1. Papules within scar from spinal surgery: Differential diagnosis includes suture granulomas, prurigo nodules, EICs, and given the history of multiple episodes of wound dehiscence sinus tracts within the surgery site. Further imaging is needed prior to any intervention or instrumentation. I will start with US of the area, but if this cannot exclude a deeper origin I would suggest that Mr. Aschoff see a spinal surgeon to help to assess. In the interim, there is an abrasion over the inferior aspect of the scar that is causing some minor bleeding. No signs of infection. I recommended Vaseline and a bandage until healed.     RTC pending imaging.       Thank you for involving me in this patient's care.     Jessi Rangel MD   of Dermatology  [facility]    CC: No referring provider defined for this encounter.    Herve Morgan MD    ____________________________________________________________________________________________________________________________________________    CC: Patient presents with:  Consult: new pt, PCP since the surgery in 2017 experiencing suture issues last year, area is the back experiencing pain a bump tx none       HPI: Todd S Aschoff is a 66 year old male presenting for initial evaluation of lesions in a scar on the lower back seen at the request of Dr. Morgan. Patient notes that he had spinal surgery on the lumbar spine in 2017 by Dr. Casas. He reports complications of 3 episodes of wound dehiscence and infection. I cannot see these records in Epic to review the details of the surgery. In the last year he notes that he has developed two bumps within the scar. The lower lesion has a small amount of blood drainage. There has been no drainage from the upper lesion. He feels that there may be a retained suture.       Patient  Active Problem List   Diagnosis     PAF (paroxysmal atrial fibrillation) (H)     Hypothyroidism     Mechanical AV Replacement 1992 -- on Warfarin      HYPERLIPIDEMIA LDL GOAL <130     BPH (benign prostatic hypertrophy)     Chronic low back pain     Tourette syndrome     Obesity     Lumbar radiculopathy     Hx of Thoracic Dissection repair -- 1992     Memory difficulties     OAB (overactive bladder)     Bilateral thoracic back pain     Obesity, BMI >35 with comorbidities     Alcohol dependence in remission (H)     Elevated prostate specific antigen (PSA)     Suicide attempt by multiple drug overdose, initial encounter (H)     Depression, unspecified depression type     Leg wound, left     Polypharmacy     Renal insufficiency     Hypotension, unspecified hypotension type     Anemia, unspecified type     Radiculitis     Rotator cuff tear     S/P lumbar fusion     Spinal stenosis of lumbar region     Hx of CVA x 4, 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  A delay in compatible RBCs may occur.       Gabapentin      Severe behavioral disturbances         Current Outpatient Medications   Medication     acetaminophen (TYLENOL) 500 MG tablet     aspirin (ASA) 81 MG chewable tablet     cetirizine (ZYRTEC) 10 MG tablet     diazepam (VALIUM) 5 MG tablet     docusate sodium (COLACE) 100 MG capsule     donepezil (ARICEPT) 10 MG tablet     folic acid (FOLVITE) 1 MG tablet     guanFACINE (TENEX) 1 MG tablet     levothyroxine  (SYNTHROID/LEVOTHROID) 150 MCG tablet     methocarbamol (ROBAXIN) 500 MG tablet     MYRBETRIQ 50 MG 24 hr tablet     pregabalin (LYRICA) 100 MG capsule     propafenone (RYTHMOL) 150 MG TABS tablet     senna-docusate (SENOKOT-S/PERICOLACE) 8.6-50 MG tablet     simvastatin (ZOCOR) 40 MG tablet     venlafaxine (EFFEXOR-XR) 75 MG 24 hr capsule     warfarin ANTICOAGULANT (COUMADIN) 2.5 MG tablet     Lidocaine (LIDOCARE) 4 % Patch     oxyCODONE (ROXICODONE) 5 MG tablet     No current facility-administered medications for this visit.       Family History   Problem Relation Age of Onset     Cerebrovascular Disease Father          age 86, had M.I. ,diabetic also     Prostate Cancer Father      Diabetes Father      Cancer Mother          age 83 of dementia, also h/o lymphoma     Diabetes Mother      Hypertension Brother         2 brothers with hypertension & sleep apnea     Hypertension Sister         Born ~1936     Sleep Apnea Brother          age 78, Alzheimers     No Known Problems Son      No Known Problems Daughter      No Known Problems Son      Family History Negative Brother         Had 5 brothers, three still alive as of 2019     Colon Cancer No family hx of        Social History     Tobacco Use     Smoking status: Never Smoker     Smokeless tobacco: Never Used   Vaping Use     Vaping Use: Never used   Substance Use Topics     Alcohol use: No     Comment: Stopped drinking alcohol ~     Drug use: No           ROS: Patient in normal state of health and is feeling well on complete ROS.     EXAM:  There were no vitals taken for this visit.  GEN: Alert, no distress  SKIN:   --Mid back to the sacrum there is a linear surgical scar. There is a linear erosion over a 1 cm nodule at the inferior aspect of the scar and a smaller 8 mm papule that is approx 6 cm superior. No drainage or fluctuance.

## 2022-02-15 NOTE — PATIENT INSTRUCTIONS
Pediatric Dermatology  [facility]  [address]  1st [place]  [address]  Phone: [phone]    Pediatric & Adult Dermatology  [facility]  [address] [facility]   2nd Floor  [facility] [address]  [phone]                  General information: Dr. Jessi Rangel is a board-certified dermatologist with subspecialty certification in pediatric dermatology.     Scheduling and Nurse Triage: Dr. Rangel sees pediatric patients on Mondays in [place] and adult and pediatric patients on Tuesdays in [place]. The remainder of the week she practices at the [facility]. Please call the above phone numbers to schedule or to talk to a nurse.     -For scheduling at the [place] or [place] locations, or to talk to the triage nurse please call the above phone number at the clinic where you were seen.     -For medication refills, please call your pharmacy.

## 2022-02-15 NOTE — PROGRESS NOTES
ANTICOAGULATION MANAGEMENT     Todd S Aschoff 66 year old male is on warfarin with supratherapeutic INR result. (Goal INR 2.5-3.5)    Recent labs: (last 7 days)     02/15/22  1031   INR 4.1*       ASSESSMENT     Source(s): Chart Review and Patient/Caregiver Call       Warfarin doses taken: Warfarin taken as instructed    Diet: No new diet changes identified    New illness, injury, or hospitalization: No. 1/18/22 had a fall with multiple rib fractures. Hematoma of right flank and buttock. Bruising on right flank and leg continues to decrease.    Medication/supplement changes: None noted    Signs or symptoms of bleeding or clotting: No    Previous INR: Supratherapeutic    Additional findings: None     PLAN     Recommended plan for ongoing change(s) affecting INR     Dosing Instructions: Partial hold then Decrease your warfarin dose (6.7% change) with next INR in 9 days       Summary  As of 2/15/2022    Full warfarin instructions:  2/15: 2.5 mg; Otherwise 5 mg every day   Next INR check:  2/24/2022             Telephone call with Nicko who agrees to plan and repeated back plan correctly    Lab visit scheduled    Education provided: Monitoring for bleeding signs and symptoms and Contact [phone]  with any changes, questions or concerns.     Plan made per [facility] anticoagulation protocol    Nova John RN  Anticoagulation Clinic  2/15/2022    _______________________________________________________________________     Anticoagulation Episode Summary     Current INR goal:  2.5-3.5   TTR:  59.8 % (11.8 mo)   Target end date:  Indefinite   Send INR reminders to:  AIDA GUERRERO    Indications    PAF (paroxysmal atrial fibrillation) (H) [I48.0]  Long term current use of anticoagulant therapy (Resolved) [Z79.01]  Mechanical AV Replacement 1992 -- on Warfarin  [Z95.2]           Comments:           Anticoagulation Care Providers     Provider Role Specialty Phone number    Obdulio Ingram MD Referring Internal Medicine  [phone]

## 2022-02-15 NOTE — LETTER
2/15/2022      RE: Todd S Aschoff  [address]  [place] [address]           Dermatology Clinic Note    Dermatology Problem List:  1. Papules within scar from spinal surgery in 2017      Assessment and Plan:    1. Papules within scar from spinal surgery: Differential diagnosis includes suture granulomas, prurigo nodules, EICs, and given the history of multiple episodes of wound dehiscence sinus tracts within the surgery site. Further imaging is needed prior to any intervention or instrumentation. I will start with US of the area, but if this cannot exclude a deeper origin I would suggest that Mr. Aschoff see a spinal surgeon to help to assess. In the interim, there is an abrasion over the inferior aspect of the scar that is causing some minor bleeding. No signs of infection. I recommended Vaseline and a bandage until healed.     RTC pending imaging.       Thank you for involving me in this patient's care.     Jessi Rangel MD   of Dermatology  [facility]    CC: No referring provider defined for this encounter.    Herve Morgan MD    ____________________________________________________________________________________________________________________________________________    CC: Patient presents with:  Consult: new pt, PCP since the surgery in 2017 experiencing suture issues last year, area is the back experiencing pain a bump tx none       HPI: Todd S Aschoff is a 66 year old male presenting for initial evaluation of lesions in a scar on the lower back seen at the request of Dr. Morgan. Patient notes that he had spinal surgery on the lumbar spine in 2017 by Dr. Casas. He reports complications of 3 episodes of wound dehiscence and infection. I cannot see these records in Epic to review the details of the surgery. In the last year he notes that he has developed two bumps within the scar. The lower lesion has a small amount of blood drainage. There has been no drainage  from the upper lesion. He feels that there may be a retained suture.       Patient Active Problem List   Diagnosis     PAF (paroxysmal atrial fibrillation) (H)     Hypothyroidism     Mechanical AV Replacement 1992 -- on Warfarin      HYPERLIPIDEMIA LDL GOAL <130     BPH (benign prostatic hypertrophy)     Chronic low back pain     Tourette syndrome     Obesity     Lumbar radiculopathy     Hx of Thoracic Dissection repair -- 1992     Memory difficulties     OAB (overactive bladder)     Bilateral thoracic back pain     Obesity, BMI >35 with comorbidities     Alcohol dependence in remission (H)     Elevated prostate specific antigen (PSA)     Suicide attempt by multiple drug overdose, initial encounter (H)     Depression, unspecified depression type     Leg wound, left     Polypharmacy     Renal insufficiency     Hypotension, unspecified hypotension type     Anemia, unspecified type     Radiculitis     Rotator cuff tear     S/P lumbar fusion     Spinal stenosis of lumbar region     Hx of CVA x 4, 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  A delay in compatible RBCs may occur.       Gabapentin      Severe behavioral disturbances         Current Outpatient Medications   Medication     acetaminophen (TYLENOL) 500 MG tablet     aspirin (ASA) 81 MG chewable tablet     cetirizine (ZYRTEC) 10 MG tablet     diazepam (VALIUM) 5 MG tablet     docusate sodium (COLACE) 100 MG capsule     donepezil (ARICEPT) 10 MG tablet     folic acid  (FOLVITE) 1 MG tablet     guanFACINE (TENEX) 1 MG tablet     levothyroxine (SYNTHROID/LEVOTHROID) 150 MCG tablet     methocarbamol (ROBAXIN) 500 MG tablet     MYRBETRIQ 50 MG 24 hr tablet     pregabalin (LYRICA) 100 MG capsule     propafenone (RYTHMOL) 150 MG TABS tablet     senna-docusate (SENOKOT-S/PERICOLACE) 8.6-50 MG tablet     simvastatin (ZOCOR) 40 MG tablet     venlafaxine (EFFEXOR-XR) 75 MG 24 hr capsule     warfarin ANTICOAGULANT (COUMADIN) 2.5 MG tablet     Lidocaine (LIDOCARE) 4 % Patch     oxyCODONE (ROXICODONE) 5 MG tablet     No current facility-administered medications for this visit.       Family History   Problem Relation Age of Onset     Cerebrovascular Disease Father          age 86, had M.I. ,diabetic also     Prostate Cancer Father      Diabetes Father      Cancer Mother          age 83 of dementia, also h/o lymphoma     Diabetes Mother      Hypertension Brother         2 brothers with hypertension & sleep apnea     Hypertension Sister         Born ~1936     Sleep Apnea Brother          age 78, Alzheimers     No Known Problems Son      No Known Problems Daughter      No Known Problems Son      Family History Negative Brother         Had 5 brothers, three still alive as of 2019     Colon Cancer No family hx of        Social History     Tobacco Use     Smoking status: Never Smoker     Smokeless tobacco: Never Used   Vaping Use     Vaping Use: Never used   Substance Use Topics     Alcohol use: No     Comment: Stopped drinking alcohol ~     Drug use: No           ROS: Patient in normal state of health and is feeling well on complete ROS.     EXAM:  There were no vitals taken for this visit.  GEN: Alert, no distress  SKIN:   --Mid back to the sacrum there is a linear surgical scar. There is a linear erosion over a 1 cm nodule at the inferior aspect of the scar and a smaller 8 mm papule that is approx 6 cm superior. No drainage or fluctuance.         Jessi Rangel MD

## 2022-02-21 ENCOUNTER — HOSPITAL ENCOUNTER (OUTPATIENT)
Dept: ULTRASOUND IMAGING | Facility: CLINIC | Age: 67
Discharge: HOME | End: 2022-02-21
Attending: DERMATOLOGY | Admitting: DERMATOLOGY
Payer: COMMERCIAL

## 2022-02-21 DIAGNOSIS — D48.5 NEOPLASM OF UNCERTAIN BEHAVIOR OF SKIN: ICD-10-CM

## 2022-02-21 PROCEDURE — 76705 ECHO EXAM OF ABDOMEN: CPT

## 2022-02-22 DIAGNOSIS — D48.5 NEOPLASM OF UNCERTAIN BEHAVIOR OF SKIN: Primary | ICD-10-CM

## 2022-02-24 ENCOUNTER — LAB (OUTPATIENT)
Dept: LAB | Facility: CLINIC | Age: 67
End: 2022-02-24
Payer: COMMERCIAL

## 2022-02-24 ENCOUNTER — ANTICOAGULATION THERAPY VISIT (OUTPATIENT)
Dept: ANTICOAGULATION | Facility: CLINIC | Age: 67
End: 2022-02-24

## 2022-02-24 DIAGNOSIS — Z95.2 AORTIC VALVE PROSTHESIS PRESENT: ICD-10-CM

## 2022-02-24 DIAGNOSIS — I48.0 PAF (PAROXYSMAL ATRIAL FIBRILLATION) (H): Primary | ICD-10-CM

## 2022-02-24 LAB — INR BLD: 2.2 (ref 0.9–1.1)

## 2022-02-24 PROCEDURE — 36416 COLLJ CAPILLARY BLOOD SPEC: CPT

## 2022-02-24 PROCEDURE — 85610 PROTHROMBIN TIME: CPT

## 2022-02-24 NOTE — PROGRESS NOTES
ANTICOAGULATION MANAGEMENT     Todd S Aschoff 66 year old male is on warfarin with subtherapeutic INR result. (Goal INR 2.5-3.5)    Recent labs: (last 7 days)     02/24/22  1058   INR 2.2*       ASSESSMENT     Source(s): Chart Review and Patient/Caregiver Call       Warfarin doses taken: Warfarin taken as instructed, partial hold on 2/15 and warfarin maintenance dose decreased    Diet: No new diet changes identified    New illness, injury, or hospitalization: No    Medication/supplement changes: None noted    Signs or symptoms of bleeding or clotting: No - Hematoma from fall 1/18/22 is decreasing, bruising has faded    Previous INR: Supratherapeutic    Additional findings: None     PLAN     Recommended plan for no diet, medication or health factor changes affecting INR     Dosing Instructions:  Increase your warfarin dose (7.1% change) with next INR in 1 week       Summary  As of 2/24/2022    Full warfarin instructions:  7.5 mg every Thu; 5 mg all other days   Next INR check:  3/3/2022             Telephone call with Nicko who agrees to plan and repeated back plan correctly    Lab visit scheduled    Education provided: Please call back if any changes to your diet, medications or how you've been taking warfarin and Contact [phone]  with any changes, questions or concerns.     Plan made per [facility] anticoagulation protocol    Lucina Andrew RN  Anticoagulation Clinic  2/24/2022    _______________________________________________________________________     Anticoagulation Episode Summary     Current INR goal:  2.5-3.5   TTR:  59.3 % (11.8 mo)   Target end date:  Indefinite   Send INR reminders to:  [facility]    Indications    PAF (paroxysmal atrial fibrillation) (H) [I48.0]  Long term current use of anticoagulant therapy (Resolved) [Z79.01]  Mechanical AV Replacement 1992 -- on Warfarin  [Z95.2]           Comments:           Anticoagulation Care Providers     Provider Role Specialty Phone number    Obdulio Ingram  MD [facility] Internal Medicine [phone]

## 2022-03-03 ENCOUNTER — LAB (OUTPATIENT)
Dept: LAB | Facility: CLINIC | Age: 67
End: 2022-03-03
Payer: COMMERCIAL

## 2022-03-03 ENCOUNTER — ANTICOAGULATION THERAPY VISIT (OUTPATIENT)
Dept: ANTICOAGULATION | Facility: CLINIC | Age: 67
End: 2022-03-03

## 2022-03-03 DIAGNOSIS — I48.0 PAF (PAROXYSMAL ATRIAL FIBRILLATION) (H): Primary | ICD-10-CM

## 2022-03-03 DIAGNOSIS — Z95.2 AORTIC VALVE PROSTHESIS PRESENT: ICD-10-CM

## 2022-03-03 LAB — INR BLD: 3.7 (ref 0.9–1.1)

## 2022-03-03 PROCEDURE — 36416 COLLJ CAPILLARY BLOOD SPEC: CPT

## 2022-03-03 PROCEDURE — 85610 PROTHROMBIN TIME: CPT

## 2022-03-03 NOTE — PROGRESS NOTES
ANTICOAGULATION MANAGEMENT     Todd S Aschoff 66 year old male is on warfarin with supratherapeutic INR result. (Goal INR 2.5-3.5)    Recent labs: (last 7 days)     03/03/22  1021   INR 3.7*       ASSESSMENT       Source(s): Chart Review and Patient/Caregiver Call       Warfarin doses taken: Warfarin taken as instructed    Diet: No new diet changes identified    New illness, injury, or hospitalization: No    Medication/supplement changes: None noted    Signs or symptoms of bleeding or clotting: Hematoma continues to shrink in size.  Patient reports that it is about the size of a baby's fist.    Previous INR: Subtherapeutic.  INR increased significantly since last visit when maintenance dose was adjusted.  Lowered maintenance dose today to allow hematoma to continue to heal.     Additional findings: None       PLAN     Recommended plan for no diet, medication or health factor changes affecting INR     Dosing Instructions:  Decrease your warfarin dose (7% change) with next INR in 1 week       Summary  As of 3/3/2022    Full warfarin instructions:  5 mg every day   Next INR check:  3/10/2022             Telephone call with Nicko who agrees to plan and repeated back plan correctly    Lab visit scheduled    Education provided: Please call back if any changes to your diet, medications or how you've been taking warfarin and Monitoring for bleeding signs and symptoms    Plan made per [facility] anticoagulation protocol    Amanda Wheeler RN  Anticoagulation Clinic  3/3/2022    _______________________________________________________________________     Anticoagulation Episode Summary     Current INR goal:  2.5-3.5   TTR:  60.6 % (11.8 mo)   Target end date:  Indefinite   Send INR reminders to:  A[facility]ERR[facility]    Indications    PAF (paroxysmal atrial fibrillation) (H) [I48.0]  Long term current use of anticoagulant therapy (Resolved) [Z79.01]  Mechanical AV Replacement 1992 -- on Warfarin  [Z95.2]           Comments:            Anticoagulation Care Providers     Provider Role Specialty Phone number    Obdulio Ingarm MD Referring Internal Medicine [phone]

## 2022-03-10 ENCOUNTER — ANTICOAGULATION THERAPY VISIT (OUTPATIENT)
Dept: ANTICOAGULATION | Facility: CLINIC | Age: 67
End: 2022-03-10

## 2022-03-10 ENCOUNTER — LAB (OUTPATIENT)
Dept: LAB | Facility: CLINIC | Age: 67
End: 2022-03-10
Payer: COMMERCIAL

## 2022-03-10 DIAGNOSIS — M62.830 BACK MUSCLE SPASM: ICD-10-CM

## 2022-03-10 DIAGNOSIS — I48.0 PAF (PAROXYSMAL ATRIAL FIBRILLATION) (H): Primary | ICD-10-CM

## 2022-03-10 DIAGNOSIS — Z95.2 AORTIC VALVE PROSTHESIS PRESENT: ICD-10-CM

## 2022-03-10 LAB — INR BLD: 3.8 (ref 0.9–1.1)

## 2022-03-10 PROCEDURE — 85610 PROTHROMBIN TIME: CPT

## 2022-03-10 PROCEDURE — 36416 COLLJ CAPILLARY BLOOD SPEC: CPT

## 2022-03-10 NOTE — PROGRESS NOTES
ANTICOAGULATION MANAGEMENT     Todd S Aschoff 66 year old male is on warfarin with supratherapeutic INR result. (Goal INR 2.5-3.5)    Recent labs: (last 7 days)     03/10/22  1049   INR 3.8*       ASSESSMENT       Source(s): Chart Review and Patient/Caregiver Call       Warfarin doses taken: Warfarin taken as instructed    Diet: No new diet changes identified    New illness, injury, or hospitalization: Yes: had loose stools yesterday, but feeling better today.    Medication/supplement changes: None noted    Signs or symptoms of bleeding or clotting: Hematoma continues to heal.    Previous INR: Supratherapeutic    Additional findings: None       PLAN     Recommended plan for temporary change(s) affecting INR     Dosing Instructions: Continue your current warfarin dose with next INR in 1 week       Summary  As of 3/10/2022    Full warfarin instructions:  5 mg every day   Next INR check:  3/17/2022             Telephone call with Nicko who agrees to plan and repeated back plan correctly    Lab visit scheduled    Education provided: Please call back if any changes to your diet, medications or how you've been taking warfarin    Plan made per [facility] anticoagulation protocol    Amanda Wheeler RN  Anticoagulation Clinic  3/10/2022    _______________________________________________________________________     Anticoagulation Episode Summary     Current INR goal:  2.5-3.5   TTR:  60.3 % (11.8 mo)   Target end date:  Indefinite   Send INR reminders to:  A[facility] GUERRERO    Indications    PAF (paroxysmal atrial fibrillation) (H) [I48.0]  Long term current use of anticoagulant therapy (Resolved) [Z79.01]  Mechanical AV Replacement 1992 -- on Warfarin  [Z95.2]           Comments:           Anticoagulation Care Providers     Provider Role Specialty Phone number    Obdulio Ingram MD Referring Internal Medicine [phone]

## 2022-03-11 RX ORDER — DIAZEPAM 5 MG
5 TABLET ORAL
Qty: 30 TABLET | Refills: 0 | Status: SHIPPED | OUTPATIENT
Start: 2022-03-11 | End: 2022-04-08

## 2022-03-14 ENCOUNTER — NURSE TRIAGE (OUTPATIENT)
Dept: NURSING | Facility: CLINIC | Age: 67
End: 2022-03-14
Payer: MEDICARE

## 2022-03-14 NOTE — TELEPHONE ENCOUNTER
"Triage Call: Prescription for Diazepam was sent to [facility] but [facility] states they have not received the prescription.     Nurse called the pharmacy and they did find the prescription in the system.    Patient was updated that the pharmacist found the prescription and that they will be giving him a call.     Nereyda Perdomo RN Nursing Advisor 3/14/2022 6:18 PM     Reason for Disposition    Caller requesting a CONTROLLED substance prescription refill (e.g., narcotics, ADHD medicines)    Additional Information    Negative: Drug overdose and triager unable to answer question    Negative: Caller requesting information unrelated to medicine    Negative: Caller requesting a prescription for Strep throat and has a positive culture result    Negative: Rash while taking a medication or within 3 days of stopping it    Negative: Immunization reaction suspected    Negative: [1] Asthma and [2] having symptoms of asthma (cough, wheezing, etc.)    Negative: [1] Influenza symptoms AND [2] anti-viral med prescription request, such as Tamiflu    Negative: [1] Symptom of illness (e.g., headache, abdominal pain, earache, vomiting) AND [2] more than mild    Negative: MORE THAN A DOUBLE DOSE of a prescription or over-the-counter (OTC) drug    Negative: [1] DOUBLE DOSE (an extra dose or lesser amount) of over-the-counter (OTC) drug AND [2] any symptoms (e.g., dizziness, nausea, pain, sleepiness)    Negative: [1] DOUBLE DOSE (an extra dose or lesser amount) of prescription drug AND [2] any symptoms (e.g., dizziness, nausea, pain, sleepiness)    Negative: Took another person's prescription drug    Negative: [1] DOUBLE DOSE (an extra dose or lesser amount) of prescription drug AND [2] NO symptoms (Exception: a double dose of antibiotics)    Negative: Diabetes drug error or overdose (e.g., took wrong type of insulin or took extra dose)    Negative: [1] Request for URGENT new prescription or refill of \"essential\" medication (i.e., likelihood of " harm to patient if not taken) AND [2] triager unable to fill per unit policy    Negative: [1] Prescription not at pharmacy AND [2] was prescribed by PCP recently    Negative: [1] Pharmacy calling with prescription questions AND [2] triager unable to answer question    Negative: [1] Caller has URGENT medication question about med that PCP or specialist prescribed AND [2] triager unable to answer question    Negative: [1] Caller has NON-URGENT medication question about med that PCP prescribed AND [2] triager unable to answer question    Negative: [1] Caller requesting a NON-URGENT new prescription or refill AND [2] triager unable to refill per unit policy    Negative: [1] Caller has medication question about med not prescribed by PCP AND [2] triager unable to answer question (e.g., compatibility with other med, storage)    Protocols used: MEDICATION QUESTION CALL-A-

## 2022-03-16 ENCOUNTER — TELEPHONE (OUTPATIENT)
Dept: WOUND CARE | Facility: CLINIC | Age: 67
End: 2022-03-16

## 2022-03-16 ENCOUNTER — OFFICE VISIT (OUTPATIENT)
Dept: NEUROSURGERY | Facility: CLINIC | Age: 67
End: 2022-03-16
Attending: DERMATOLOGY
Payer: COMMERCIAL

## 2022-03-16 VITALS
DIASTOLIC BLOOD PRESSURE: 91 MMHG | HEART RATE: 80 BPM | SYSTOLIC BLOOD PRESSURE: 146 MMHG | BODY MASS INDEX: 39.29 KG/M2 | WEIGHT: 306 LBS | OXYGEN SATURATION: 99 %

## 2022-03-16 DIAGNOSIS — D48.5 NEOPLASM OF UNCERTAIN BEHAVIOR OF SKIN: Primary | ICD-10-CM

## 2022-03-16 PROCEDURE — 99203 OFFICE O/P NEW LOW 30 MIN: CPT | Performed by: PHYSICIAN ASSISTANT

## 2022-03-16 PROCEDURE — G0463 HOSPITAL OUTPT CLINIC VISIT: HCPCS

## 2022-03-16 ASSESSMENT — PAIN SCALES - GENERAL: PAINLEVEL: SEVERE PAIN (7)

## 2022-03-16 NOTE — PROGRESS NOTES
NEUROSURGERY CLINIC CONSULT NOTE     DATE OF VISIT: 3/16/2022     SUBJECTIVE:     Todd S Aschoff is a pleasant 66 year old male who presents to the clinic today for consultation on pain and dehiscence at his previous lumbar spine surgical incision performed by Dr. Casas in 2017. He is self referred.    Today, he reports a one-month history of symptoms with an exacerbation since December. He describes constant, sharp pain at the lumbar incisional region that does not radiate what-so-ever nor is it accompanied by paresthesia, numbness or perceived weakness. He cannot appreciate any aggravating or alleviating factors. There are no bowel or bladder changes. He denies saddle anesthesia. He denies changes in gait, instability, or falling episodes.     He is anti-coagulated due to an atrial valve replacement. This is MRI compatible.       Current Outpatient Medications:      acetaminophen (TYLENOL) 500 MG tablet, Take 500-1,000 mg by mouth 2 times daily , Disp: , Rfl:      aspirin (ASA) 81 MG chewable tablet, Take 81 mg by mouth daily , Disp: , Rfl:      cetirizine (ZYRTEC) 10 MG tablet, TAKE ONE TABLET BY MOUTH ONCE DAILY AS NEEDED, Disp: 90 tablet, Rfl: 3     diazepam (VALIUM) 5 MG tablet, Take 1 tablet (5 mg) by mouth nightly as needed for anxiety, Disp: 30 tablet, Rfl: 0     docusate sodium (COLACE) 100 MG capsule, Take 1 capsule (100 mg) by mouth 3 times daily as needed for constipation, Disp: 20 capsule, Rfl: 0     donepezil (ARICEPT) 10 MG tablet, Take 1 tablet (10 mg) by mouth At Bedtime, Disp: 90 tablet, Rfl: 0     folic acid (FOLVITE) 1 MG tablet, Take 5 tablets  by mouth daily, Disp: 450 tablet, Rfl: 3     guanFACINE (TENEX) 1 MG tablet, Take 1 tablet (1 mg) by mouth At Bedtime, Disp: 90 tablet, Rfl: 0     levothyroxine (SYNTHROID/LEVOTHROID) 150 MCG tablet, Take 1 tablet (150 mcg) by mouth daily, Disp: 90 tablet, Rfl: 3     Lidocaine (LIDOCARE) 4 % Patch, Place 1 patch onto the skin every 24 hours To  prevent lidocaine toxicity, patient should be patch free for 12 hrs daily., Disp: , Rfl:      methocarbamol (ROBAXIN) 500 MG tablet, Take 1 tablet (500 mg) by mouth 4 times daily, Disp: 20 tablet, Rfl: 0     MYRBETRIQ 50 MG 24 hr tablet, TAKE ONE TABLET BY MOUTH ONE TIME DAILY, Disp: 90 tablet, Rfl: 3     oxyCODONE (ROXICODONE) 5 MG tablet, Take 1 tablet (5 mg) by mouth every 6 hours as needed for moderate to severe pain, Disp: 12 tablet, Rfl: 0     pregabalin (LYRICA) 100 MG capsule, Take 1 capsule (100 mg) by mouth 3 times daily Do not take if sleepy/sedated., Disp: 270 capsule, Rfl: 3     propafenone (RYTHMOL) 150 MG TABS tablet, Take 1 tablet (150 mg) by mouth every 8 hours, Disp: 270 tablet, Rfl: 1     senna-docusate (SENOKOT-S/PERICOLACE) 8.6-50 MG tablet, Take 1 tablet by mouth 2 times daily as needed for constipation, Disp: , Rfl:      simvastatin (ZOCOR) 40 MG tablet, Take 1 tablet (40 mg) by mouth At Bedtime. Need to update cholesterol level before additional refills., Disp: 90 tablet, Rfl: 3     venlafaxine (EFFEXOR-XR) 75 MG 24 hr capsule, Take 3 capsules (225 mg) by mouth daily, Disp: 270 capsule, Rfl: 1     warfarin ANTICOAGULANT (COUMADIN) 2.5 MG tablet, Take 5 mg by mouth daily 7.5mg=Wed and Sun, 5mg=ROW, Disp: , Rfl:      Allergies   Allergen Reactions     Blood Transfusion Related (Informational Only) Other (See Comments)     Patient has a history of a clinically significant antibody against RBC antigens.  A delay in compatible RBCs may occur.       Gabapentin      Severe behavioral disturbances        Past Medical History:   Diagnosis Date     Advanced directives, counseling/discussion 1/6/2012    Discussed Advance Directive planning with patient; information given to patient to review.     Alcohol dependence (H)      Alcohol dependence in remission (H) 8/2/2018     Allergy, unspecified not elsewhere classified     seasonal     Anemia, unspecified type 1/22/2021     Aortic valve prosthesis present  2/8/2021     Atrial fibrillation (H)      Benign prostatic hyperplasia 2/8/2021     Bilateral thoracic back pain 11/16/2016     BPH (benign prostatic hypertrophy)      Chronic atrial fibrillation (H) 8/7/2002     Chronic infection     low back wound incision not healing      Chronic low back pain 8/19/2011     Chronic pain     lower back and right leg and left leg     Depressive disorder 6/9/2010    Depression  NOS     Dissection of aorta, thoracic (H) 1992    [facility] [employer] [facility] aotic valve + arch graft 1992     Elevated prostate specific antigen (PSA) 1/22/2020     Family history of prostate cancer 1/22/2020     Generalized muscle weakness 1/22/2021     Headache(784.0)      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 Provider to review     Leg wound, left 1/9/2021     Long term current use of anticoagulant therapy 8/7/2002    LW Modifier:  Coumadin, since mechanical aortic valve LW Onset:  1992 ; Anticoagulant Rx Long Term Problem list name updated by automated process. Provider to review     Low back pain      Lumbar radiculopathy 4/3/2013     Lumbar surgical wound fluid collection 5/23/2013     Major depression      Memory difficulties 1/16/2015     Mixed hyperlipidemia      Morbid obesity (H) 4/20/2010    LW Modifier:  BMI 41.3 (Apr 2010) ; Obesity Morbid     Numbness and tingling     right leg post surg rightt hip/ also left leg since surg     OA (osteoarthritis)     hips     OAB (overactive bladder) 5/11/2015     Obesity, unspecified      Persons encountering health services in other specified circumstances 4/3/2012    Formatting of this note might be different from the original. EMERGENCY CARE PLAN Presenting Problem Signs and Symptoms Treatment Plan   Questions or  conerns during clinic hours    I will call the clinic directly    Questions or conerns outside clinic hours    I will call the 24 hour nurse line at [phone]   Patient needs to schedule an appointment    I will call the 24 hour scheduling team at     Polypharmacy 1/22/2021     Prostate infection      Radiculitis 4/20/2010    LW Modifier:  Right foot, s/p R AMANDA LW Onset:  2002 ; Neuralgia     Recurrent major depressive episodes, in full remission (H) 10/30/2012     Renal insufficiency 1/22/2021     Rotator cuff tear 1/26/2015    Rotator cuff tear, right     S/P lumbar fusion 4/5/2018     S/P 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     Family History has been reviewed with the patient, there are no pertinent findings to presenting concern.     Past Surgical History:   Procedure Laterality Date     AORTIC VALVE REPLACEMENT  1992    [facility] [facility]'s valve     APPLY WOUND VAC Left 1/26/2021    Procedure: Placement of negative pressure wound therapy 2,400 squared centimeters  ;  Surgeon: Lazaro Plascencia MD;  Location:  OR     CATARACT IOL, RT/LT      rt eye only     CHOLECYSTECTOMY, LAPOROSCOPIC  8/10     CLOSE SECONDARY WOUND LOWER EXTREMITY Left 2/3/2021    Procedure:  Split Thickness Skin Graft to Leg of 18 square centimeters  Intermediate Closure of Leg of 8cm   ;  Surgeon: Lazaro Plascencia MD;  Location:  OR     COLONOSCOPY N/A 1/15/2015    Procedure: COLONOSCOPY;  Surgeon: Johnson Kothari MD;  Location:  GI     DECOMPRESSION LUMBAR ONE  LEVEL  4/3/2013    Procedure: DECOMPRESSION LUMBAR ONE LEVEL;  Open Decompression L3-4 bilateral;  Surgeon: Khalif Coffey MD;  Location: [facility] OR     DECOMPRESSION, FUSION CERVICAL ANTERIOR ONE LEVEL, COMBINED  3/23/2012    Procedure:COMBINED DECOMPRESSION, FUSION CERVICAL ANTERIOR ONE LEVEL; Anterior Cervical Decompression and Fusion C4-6; Surgeon:KHALIF COFFEY; Location:[facility] OR     ELBOW SURGERY       EXPLORE SPINE, REMOVE HARDWARE, COMBINED  5/23/2013    Procedure: COMBINED EXPLORE SPINE, REMOVE HARDWARE;  Exploration Lumbar Wound for fluid collection;  Surgeon: Khalif Coffey MD;  Location: [facility] OR     FUSION CERVICAL ANTERIOR TWO LEVELS  3/26/2012    Procedure:FUSION CERVICAL ANTERIOR TWO LEVELS; Anterior Cervical Fusion C4-6, Anterior Cervical  Hematoma Evacuation; Surgeon:KHALIF COFFEY; Location:[facility] OR     IR LUMBAR EPIDURAL STEROID INJECTION  3/28/2003     IRRIGATION AND DEBRIDEMENT LOWER EXTREMITY, COMBINED Left 1/10/2021    Procedure: 1.  Irrigation and excisional debridement to muscle left distal medial calf chronic wounds total area measuring 9 cm x 4 cm 2.  Irrigation and excisional debridement to fascia left medial calf chronic hematoma measuring 29 x 6.7 x 4.2 cm 3.  Primary complex wound closure left medial distal calf 9 cm in length;  Surgeon: Rod Kemp MD;  Location: [facility] OR     IRRIGATION AND DEBRIDEMENT LOWER EXTREMITY, COMBINED Left 1/26/2021    Procedure: Evacuation of hematoma debridement of skin, subcutaneous tissue and muscle 2,400 squared centimeters, placement of negative pressure wound therapy 2,400 squared centimeters;  Surgeon: Lazaro Plascencia MD;  Location: [facility] OR     IRRIGATION AND DEBRIDEMENT SPINE, CLOSE WOUND, COMBINED  3/26/2012    Procedure:COMBINED IRRIGATION AND DEBRIDEMENT SPINE, CLOSE WOUND; Surgeon:KHALIF COFFEY; Location:[facility] OR     OTHER SURGICAL HISTORY      [place] UNLISTED ORBIT     OTHER SURGICAL HISTORY      [place] UNLISTED SPINE     OTHER  SURGICAL HISTORY      [place] UNLISTED NECK/THORAX     OTHER SURGICAL HISTORY      (IA) DC TOTAL HIP ARTHROPLASTY     OTHER SURGICAL HISTORY      (IA) DC NEE SCOPE MED W LAT MENISCECT WWO DEBRIBE/SHAVE ANY CO     removal of cyst of back   2.5week     TONSILLECTOMY       wisdom teeth[       [facility] NONSPECIFIC PROCEDURE  1992    repair of TAA with graft     [facility] TOTAL HIP ARTHROPLASTY  2010    Left AMANDA     [facility] TOTAL HIP ARTHROPLASTY  2002    R hip replacement comp's by nerve injury with pain down into R leg, nadya below knee        Social History     Tobacco Use     Smoking status: Never Smoker     Smokeless tobacco: Never Used   Vaping Use     Vaping Use: Never used   Substance Use Topics     Alcohol use: No     Comment: Stopped drinking alcohol ~2009     Drug use: No        OBJECTIVE:   BP (!) 146/91   Pulse 80   Wt 306 lb (138.8 kg)   SpO2 99%   BMI 39.29 kg/m     Body mass index is 39.29 kg/m .     Imaging:     ULTRASOUND ABDOMEN LIMITED  2/21/2022 2:08 PM    Findings, per radiologist read, notable for:      At least two discrete hypoechoic nodular regions in the  subcutaneous tissues of the lower back. There do appear to be areas  extending deeper towards the spine, but this is not fully  characterized by this exam. Sinus tracts difficult to exclude. Suggest  further workup with MRI.    Full radiological report in chart. Imaging was reviewed with with patient today.     Exam:     Patient appears comfortable, conversational, and in no apparent distress.   Head: Normocephalic, without obvious abnormality, atraumatic, no facial asymmetry.   Eyes: conjunctivae/corneas clear. PERRL, EOM's intact.   Throat: lips, mucosa, and tongue normal; teeth and gums normal.   Neck: supple, symmetrical, trachea midline, no adenopathy and thyroid: not enlarged, symmetric, no tenderness/mass/nodules.   Lungs: clear to auscultation bilaterally.   Heart: regular rate and rhythm.   Abdomen: soft, non-tender; bowel sounds normal; no masses, no  organomegaly.   Pulses: 2+ and symmetric.   Skin: Skin color, texture, turgor normal. No rashes or lesions.     CN II-XII grossly intact, alert and appropriate with conversation and following commands.   Gait is non-antalgic. Able to tandem walk. Able to walk on toes and heels without difficulty.   Cervical spine is non tender to palpation. Appropriate range of motion of neck, not concerning for lhermitte's phenomenon.   Bilateral bicep 2/4 and tricep reflexes 1/4. Sensation intact throughout upper extremities.     UE muscle strength  Right  Left    Deltoid  5/5  5/5    Biceps  5/5  5/5    Triceps  5/5  5/5    Hand intrinsics  5/5  5/5    Hand grasp  5/5  5/5    Escobar signs  neg  neg      Lumbar spine is non tender to palpation.  Distal dehiscence of lumbar spine, proximal area with a bump.   Intact sensation throughout lower extremities.   Bilateral patellar 2/4 and achilles reflex 1/4.     LE muscle strength  Right  Left    Iliopsoas (hip flexion)  5/5  5/5    Quad (knee extension)  5/5  5/5    Hamstring (knee flexion)  5/5  5/5    Gastrocnemius (PF)  5/5  5/5    Tibialis Ant. (DF)  5/5  5/5    EHL  5/5  5/5      Negative Babinski bilaterally. Negative for clonus.   Calves are soft and non-tender bilaterally.     ASSESSMENT/PLAN:     Todd S Aschoff is a 66 year old male who presents to the clinic for consultation on pain and dehiscence at his previous lumbar spine surgical incision performed by Dr. Casas in 2017. The patient's most recent imaging was reviewed with him today. It was explained that images show at least two discrete hypoechoic nodular regions in the subcutaneous tissues of the lower back. There do appear to be areas extending deeper towards the spine, but this is not fully  characterized by this exam. Sinus tracts difficult to exclude, which correlates to today's clinical examination. On exam, he is noted to have appropriate strength, sensation and range of motion.     Based on his physical  exam, we do feel that it would be in his best interest to obtain a lumbar MRI W/WO to further assess our concern for any concerning pathology. Once the images have been obtained he has agreed to call our office back at [phone] to further discuss possible surgical interventions or other conservative therapies. We will also have him be evaluated by the Wound Management Team.     We also discussed signs of a worsening problem that he should seek being evaluated.        Respectfully,     ROHAN Hollis, PA-C  [facility]  [facility]  Tel: [phone]      Exam, imaging, and plan reviewed by Dr. Norris.

## 2022-03-16 NOTE — LETTER
3/16/2022         RE: Todd S Aschoff  [address]  [place] [address]        Dear Colleague,    Thank you for referring your patient, Todd S Aschoff, to the [facility]UROS[facility] [place]. Please see a copy of my visit note below.    NEUROSURGERY CLINIC CONSULT NOTE     DATE OF VISIT: 3/16/2022     SUBJECTIVE:     Todd S Aschoff is a pleasant 66 year old male who presents to the clinic today for consultation on pain and dehiscence at his previous lumbar spine surgical incision performed by Dr. Casas in 2017. He is self referred.    Today, he reports a one-month history of symptoms with an exacerbation since December. He describes constant, sharp pain at the lumbar incisional region that does not radiate what-so-ever nor is it accompanied by paresthesia, numbness or perceived weakness. He cannot appreciate any aggravating or alleviating factors. There are no bowel or bladder changes. He denies saddle anesthesia. He denies changes in gait, instability, or falling episodes.     He is anti-coagulated due to an atrial valve replacement. This is MRI compatible.       Current Outpatient Medications:      acetaminophen (TYLENOL) 500 MG tablet, Take 500-1,000 mg by mouth 2 times daily , Disp: , Rfl:      aspirin (ASA) 81 MG chewable tablet, Take 81 mg by mouth daily , Disp: , Rfl:      cetirizine (ZYRTEC) 10 MG tablet, TAKE ONE TABLET BY MOUTH ONCE DAILY AS NEEDED, Disp: 90 tablet, Rfl: 3     diazepam (VALIUM) 5 MG tablet, Take 1 tablet (5 mg) by mouth nightly as needed for anxiety, Disp: 30 tablet, Rfl: 0     docusate sodium (COLACE) 100 MG capsule, Take 1 capsule (100 mg) by mouth 3 times daily as needed for constipation, Disp: 20 capsule, Rfl: 0     donepezil (ARICEPT) 10 MG tablet, Take 1 tablet (10 mg) by mouth At Bedtime, Disp: 90 tablet, Rfl: 0     folic acid (FOLVITE) 1 MG tablet, Take 5 tablets  by mouth daily, Disp: 450 tablet, Rfl: 3     guanFACINE (TENEX) 1 MG tablet,  Take 1 tablet (1 mg) by mouth At Bedtime, Disp: 90 tablet, Rfl: 0     levothyroxine (SYNTHROID/LEVOTHROID) 150 MCG tablet, Take 1 tablet (150 mcg) by mouth daily, Disp: 90 tablet, Rfl: 3     Lidocaine (LIDOCARE) 4 % Patch, Place 1 patch onto the skin every 24 hours To prevent lidocaine toxicity, patient should be patch free for 12 hrs daily., Disp: , Rfl:      methocarbamol (ROBAXIN) 500 MG tablet, Take 1 tablet (500 mg) by mouth 4 times daily, Disp: 20 tablet, Rfl: 0     MYRBETRIQ 50 MG 24 hr tablet, TAKE ONE TABLET BY MOUTH ONE TIME DAILY, Disp: 90 tablet, Rfl: 3     oxyCODONE (ROXICODONE) 5 MG tablet, Take 1 tablet (5 mg) by mouth every 6 hours as needed for moderate to severe pain, Disp: 12 tablet, Rfl: 0     pregabalin (LYRICA) 100 MG capsule, Take 1 capsule (100 mg) by mouth 3 times daily Do not take if sleepy/sedated., Disp: 270 capsule, Rfl: 3     propafenone (RYTHMOL) 150 MG TABS tablet, Take 1 tablet (150 mg) by mouth every 8 hours, Disp: 270 tablet, Rfl: 1     senna-docusate (SENOKOT-S/PERICOLACE) 8.6-50 MG tablet, Take 1 tablet by mouth 2 times daily as needed for constipation, Disp: , Rfl:      simvastatin (ZOCOR) 40 MG tablet, Take 1 tablet (40 mg) by mouth At Bedtime. Need to update cholesterol level before additional refills., Disp: 90 tablet, Rfl: 3     venlafaxine (EFFEXOR-XR) 75 MG 24 hr capsule, Take 3 capsules (225 mg) by mouth daily, Disp: 270 capsule, Rfl: 1     warfarin ANTICOAGULANT (COUMADIN) 2.5 MG tablet, Take 5 mg by mouth daily 7.5mg=Wed and Sun, 5mg=ROW, Disp: , Rfl:      Allergies   Allergen Reactions     Blood Transfusion Related (Informational Only) Other (See Comments)     Patient has a history of a clinically significant antibody against RBC antigens.  A delay in compatible RBCs may occur.       Gabapentin      Severe behavioral disturbances        Past Medical History:   Diagnosis Date     Advanced directives, counseling/discussion 1/6/2012    Discussed Advance Directive planning  with patient; information given to patient to review.     Alcohol dependence (H)      Alcohol dependence in remission (H) 8/2/2018     Allergy, unspecified not elsewhere classified     seasonal     Anemia, unspecified type 1/22/2021     Aortic valve prosthesis present 2/8/2021     Atrial fibrillation (H)      Benign prostatic hyperplasia 2/8/2021     Bilateral thoracic back pain 11/16/2016     BPH (benign prostatic hypertrophy)      Chronic atrial fibrillation (H) 8/7/2002     Chronic infection     low back wound incision not healing      Chronic low back pain 8/19/2011     Chronic pain     lower back and right leg and left leg     Depressive disorder 6/9/2010    Depression  NOS     Dissection of aorta, thoracic (H) 1992    [employer] Jose [employer] aotic valve + arch graft 1992     Elevated prostate specific antigen (PSA) 1/22/2020     Family history of prostate cancer 1/22/2020     Generalized muscle weakness 1/22/2021     Headache(784.0)      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 Provider to review     Leg wound, left 1/9/2021     Long term current use of anticoagulant therapy 8/7/2002    LW Modifier:  Coumadin, since mechanical aortic valve LW Onset:  1992 ; Anticoagulant Rx Long Term Problem list name updated by automated process. Provider to review     Low back pain      Lumbar radiculopathy 4/3/2013     Lumbar surgical wound fluid collection 5/23/2013     Major depression      Memory difficulties 1/16/2015     Mixed hyperlipidemia      Morbid obesity (H) 4/20/2010    LW Modifier:  BMI 41.3 (Apr 2010) ; Obesity Morbid     Numbness and tingling     right leg post surg rightt hip/ also left leg since surg     OA (osteoarthritis)     hips     OAB (overactive bladder)  5/11/2015     Obesity, unspecified      Persons encountering health services in other specified circumstances 4/3/2012    Formatting of this note might be different from the original. EMERGENCY CARE PLAN Presenting Problem Signs and Symptoms Treatment Plan   Questions or conerns during clinic hours    I will call the clinic directly    Questions or conerns outside clinic hours    I will call the 24 hour nurse line at [phone]   Patient needs to schedule an appointment    I will call the 24 hour scheduling team at     Polypharmacy 1/22/2021     Prostate infection      Radiculitis 4/20/2010    LW Modifier:  Right foot, s/p R AMANDA LW Onset:  2002 ; Neuralgia     Recurrent major depressive episodes, in full remission (H) 10/30/2012     Renal insufficiency 1/22/2021     Rotator cuff tear 1/26/2015    Rotator cuff tear, right     S/P lumbar fusion 4/5/2018     S/P 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     Family History has been reviewed with the patient, there are no pertinent findings to presenting concern.     Past Surgical History:   Procedure Laterality Date     AORTIC VALVE REPLACEMENT  1992    [employer] Jose F's valve     APPLY WOUND VAC Left 1/26/2021    Procedure: Placement of negative pressure wound therapy 2,400 squared centimeters  ;  Surgeon: Lazaro Plascencia MD;  Location: [facility] OR     CATARACT IOL, RT/LT      rt eye only     CHOLECYSTECTOMY, LAPOROSCOPIC  8/10     CLOSE SECONDARY WOUND LOWER EXTREMITY Left 2/3/2021    Procedure:  Split  Thickness Skin Graft to Leg of 18 square centimeters  Intermediate Closure of Leg of 8cm   ;  Surgeon: Lazaro Plascencia MD;  Location: [facility] OR     COLONOSCOPY N/A 1/15/2015    Procedure: COLONOSCOPY;  Surgeon: Johnson Kothari MD;  Location: [facility] GI     DECOMPRESSION LUMBAR ONE LEVEL  4/3/2013    Procedure: DECOMPRESSION LUMBAR ONE LEVEL;  Open Decompression L3-4 bilateral;  Surgeon: Travis Coffey MD;  Location: [facility] OR     DECOMPRESSION, FUSION CERVICAL ANTERIOR ONE LEVEL, COMBINED  3/23/2012    Procedure:COMBINED DECOMPRESSION, FUSION CERVICAL ANTERIOR ONE LEVEL; Anterior Cervical Decompression and Fusion C4-6; Surgeon:TRAVIS COFFEY; Location:[facility] OR     ELBOW SURGERY       EXPLORE SPINE, REMOVE HARDWARE, COMBINED  5/23/2013    Procedure: COMBINED EXPLORE SPINE, REMOVE HARDWARE;  Exploration Lumbar Wound for fluid collection;  Surgeon: Travis Coffey MD;  Location: [facility] OR     FUSION CERVICAL ANTERIOR TWO LEVELS  3/26/2012    Procedure:FUSION CERVICAL ANTERIOR TWO LEVELS; Anterior Cervical Fusion C4-6, Anterior Cervical  Hematoma Evacuation; Surgeon:TRAVIS COFFEY; Location:[facility] OR     IR LUMBAR EPIDURAL STEROID INJECTION  3/28/2003     IRRIGATION AND DEBRIDEMENT LOWER EXTREMITY, COMBINED Left 1/10/2021    Procedure: 1.  Irrigation and excisional debridement to muscle left distal medial calf chronic wounds total area measuring 9 cm x 4 cm 2.  Irrigation and excisional debridement to fascia left medial calf chronic hematoma measuring 29 x 6.7 x 4.2 cm 3.  Primary complex wound closure left medial distal calf 9 cm in length;  Surgeon: Rod Kemp MD;  Location: [facility] OR     IRRIGATION AND DEBRIDEMENT LOWER EXTREMITY, COMBINED Left 1/26/2021    Procedure: Evacuation of hematoma debridement of skin, subcutaneous tissue and muscle 2,400 squared centimeters, placement of negative pressure wound therapy 2,400 squared centimeters;  Surgeon: Lazaro Plascencia MD;  Location: [facility] OR     IRRIGATION AND  DEBRIDEMENT SPINE, CLOSE WOUND, COMBINED  3/26/2012    Procedure:COMBINED IRRIGATION AND DEBRIDEMENT SPINE, CLOSE WOUND; Surgeon:TRAVIS COFFEY; Location:[facility] OR     OTHER SURGICAL HISTORY      SC UNLISTED ORBIT     OTHER SURGICAL HISTORY      SC UNLISTED SPINE     OTHER SURGICAL HISTORY      SC UNLISTED NECK/THORAX     OTHER SURGICAL HISTORY      (IA) SC TOTAL HIP ARTHROPLASTY     OTHER SURGICAL HISTORY      (IA) SC NEE SCOPE MED W LAT MENISCECT WWO DEBRIBE/SHAVE ANY CO     removal of cyst of back   2.5week     TONSILLECTOMY       wisdom teeth[       [facility] NONSPECIFIC PROCEDURE  1992    repair of TAA with graft     [facility] TOTAL HIP ARTHROPLASTY  2010    Left AMANDA     [facility] TOTAL HIP ARTHROPLASTY  2002    R hip replacement comp's by nerve injury with pain down into R leg, nadya below knee        Social History     Tobacco Use     Smoking status: Never Smoker     Smokeless tobacco: Never Used   Vaping Use     Vaping Use: Never used   Substance Use Topics     Alcohol use: No     Comment: Stopped drinking alcohol ~2009     Drug use: No        OBJECTIVE:   BP (!) 146/91   Pulse 80   Wt 306 lb (138.8 kg)   SpO2 99%   BMI 39.29 kg/m     Body mass index is 39.29 kg/m .     Imaging:     ULTRASOUND ABDOMEN LIMITED  2/21/2022 2:08 PM    Findings, per radiologist read, notable for:      At least two discrete hypoechoic nodular regions in the  subcutaneous tissues of the lower back. There do appear to be areas  extending deeper towards the spine, but this is not fully  characterized by this exam. Sinus tracts difficult to exclude. Suggest  further workup with MRI.    Full radiological report in chart. Imaging was reviewed with with patient today.     Exam:     Patient appears comfortable, conversational, and in no apparent distress.   Head: Normocephalic, without obvious abnormality, atraumatic, no facial asymmetry.   Eyes: conjunctivae/corneas clear. PERRL, EOM's intact.   Throat: lips, mucosa, and tongue normal; teeth and gums  normal.   Neck: supple, symmetrical, trachea midline, no adenopathy and thyroid: not enlarged, symmetric, no tenderness/mass/nodules.   Lungs: clear to auscultation bilaterally.   Heart: regular rate and rhythm.   Abdomen: soft, non-tender; bowel sounds normal; no masses, no organomegaly.   Pulses: 2+ and symmetric.   Skin: Skin color, texture, turgor normal. No rashes or lesions.     CN II-XII grossly intact, alert and appropriate with conversation and following commands.   Gait is non-antalgic. Able to tandem walk. Able to walk on toes and heels without difficulty.   Cervical spine is non tender to palpation. Appropriate range of motion of neck, not concerning for lhermitte's phenomenon.   Bilateral bicep 2/4 and tricep reflexes 1/4. Sensation intact throughout upper extremities.     UE muscle strength  Right  Left    Deltoid  5/5  5/5    Biceps  5/5  5/5    Triceps  5/5  5/5    Hand intrinsics  5/5  5/5    Hand grasp  5/5  5/5    Escobar signs  neg  neg      Lumbar spine is non tender to palpation.  Distal dehiscence of lumbar spine, proximal area with a bump.   Intact sensation throughout lower extremities.   Bilateral patellar 2/4 and achilles reflex 1/4.     LE muscle strength  Right  Left    Iliopsoas (hip flexion)  5/5  5/5    Quad (knee extension)  5/5  5/5    Hamstring (knee flexion)  5/5  5/5    Gastrocnemius (PF)  5/5  5/5    Tibialis Ant. (DF)  5/5  5/5    EHL  5/5  5/5      Negative Babinski bilaterally. Negative for clonus.   Calves are soft and non-tender bilaterally.     ASSESSMENT/PLAN:     Todd S Aschoff is a 66 year old male who presents to the clinic for consultation on pain and dehiscence at his previous lumbar spine surgical incision performed by Dr. Casas in 2017. The patient's most recent imaging was reviewed with him today. It was explained that images show at least two discrete hypoechoic nodular regions in the subcutaneous tissues of the lower back. There do appear to be areas  extending deeper towards the spine, but this is not fully  characterized by this exam. Sinus tracts difficult to exclude, which correlates to today's clinical examination. On exam, he is noted to have appropriate strength, sensation and range of motion.     Based on his physical exam, we do feel that it would be in his best interest to obtain a lumbar MRI W/WO to further assess our concern for any concerning pathology. Once the images have been obtained he has agreed to call our office back at [phone] to further discuss possible surgical interventions or other conservative therapies. We will also have him be evaluated by the Wound Management Team.     We also discussed signs of a worsening problem that he should seek being evaluated.        Respectfully,     ROHAN Hollis PA-C  [facility] Neurosurgery  [facility]  Tel: [phone]      Exam, imaging, and plan reviewed by Dr. Norris.       Again, thank you for allowing me to participate in the care of your patient.        Sincerely,        Moshe Mayer PA-C

## 2022-03-16 NOTE — NURSING NOTE
"Todd S Aschoff is a 66 year old male who presents for:  Chief Complaint   Patient presents with     Consult     nodules in the skin at surgery site        Vitals:    Vitals:    03/16/22 1056   BP: (!) 146/91   Pulse: 80   SpO2: 99%   Weight: 306 lb (138.8 kg)       BMI:  Estimated body mass index is 39.29 kg/m  as calculated from the following:    Height as of 1/18/22: 6' 2\" (1.88 m).    Weight as of this encounter: 306 lb (138.8 kg).    Pain Score:  Severe Pain (7)        Amendo Phorn      "

## 2022-03-16 NOTE — PATIENT INSTRUCTIONS
-Order placed for lumbar MR imaging W/WO. Please call [facility] at [phone] to schedule the date, time and location that is most convenient for you to obtain your imaging. Once the imaging has been completed, please contact my office the next day to review the images as well as treatment options. You can contact my office at [phone].      ROHAN Hollis, DA  [facility] Neurosurgery  [facility]     Tel: [phone]  Fax: [phone]

## 2022-03-16 NOTE — TELEPHONE ENCOUNTER
Consult received via workqueue from provider Moshe Mayer PA-C for lumbar surgical wound.  Please schedule with providers Chantel (Or Marixa or Mckinley if unavailable) at [facility] [place] at [facility].    Routing to   wound healing scheduling.

## 2022-03-17 ENCOUNTER — LAB (OUTPATIENT)
Dept: LAB | Facility: CLINIC | Age: 67
End: 2022-03-17
Payer: COMMERCIAL

## 2022-03-17 ENCOUNTER — ANTICOAGULATION THERAPY VISIT (OUTPATIENT)
Dept: ANTICOAGULATION | Facility: CLINIC | Age: 67
End: 2022-03-17

## 2022-03-17 DIAGNOSIS — Z95.2 AORTIC VALVE PROSTHESIS PRESENT: ICD-10-CM

## 2022-03-17 DIAGNOSIS — I48.0 PAF (PAROXYSMAL ATRIAL FIBRILLATION) (H): Primary | ICD-10-CM

## 2022-03-17 LAB — INR BLD: 4 (ref 0.9–1.1)

## 2022-03-17 PROCEDURE — 36416 COLLJ CAPILLARY BLOOD SPEC: CPT

## 2022-03-17 PROCEDURE — 85610 PROTHROMBIN TIME: CPT

## 2022-03-17 NOTE — PROGRESS NOTES
ANTICOAGULATION MANAGEMENT     Todd S Aschoff 66 year old male is on warfarin with supratherapeutic INR result. (Goal INR 2.5-3.5)    Recent labs: (last 7 days)     03/17/22  1049   INR 4.0*       ASSESSMENT       Source(s): Chart Review    Previous INR was Supratherapeutic    Medication, diet, health changes since last INR chart reviewed; none identified           PLAN     Unable to reach Nicko today.    Left message to call back to [facility].     Follow up required to confirm warfarin dose taken and assess for changes    Blayne Johansen RN  Anticoagulation Clinic  3/17/2022

## 2022-03-17 NOTE — PROGRESS NOTES
ANTICOAGULATION MANAGEMENT     Todd S Aschoff 66 year old male is on warfarin with supratherapeutic INR result. (Goal INR 2.5-3.5)    Recent labs: (last 7 days)     03/17/22  1049   INR 4.0*       ASSESSMENT       Source(s): Chart Review and Patient/Caregiver Call       Warfarin doses taken: Warfarin taken as instructed    Diet: Decreased greens/vitamin K in diet; plans to resume previous intake    New illness, injury, or hospitalization: No    Medication/supplement changes: None noted    Signs or symptoms of bleeding or clotting: No    Previous INR: Supratherapeutic    Additional findings: None       PLAN     Recommended plan for temporary change(s) affecting INR     Dosing Instructions: Partial hold then continue your current warfarin dose with next INR in 1 week       Summary  As of 3/17/2022    Full warfarin instructions:  3/17: 3.75 mg; Otherwise 5 mg every day   Next INR check:  3/25/2022             Telephone call with Nicko who verbalizes understanding and agrees to plan    Lab visit scheduled    Education provided: Importance of consistent vitamin K intake, Impact of vitamin K foods on INR, Goal range and significance of current result and Importance of therapeutic range    Plan made per [facility] anticoagulation protocol    Blayne Johansen RN  Anticoagulation Clinic  3/17/2022    _______________________________________________________________________     Anticoagulation Episode Summary     Current INR goal:  2.5-3.5   TTR:  59.0 % (11.8 mo)   Target end date:  Indefinite   Send INR reminders to:  AIDA GUERRERO    Indications    PAF (paroxysmal atrial fibrillation) (H) [I48.0]  Long term current use of anticoagulant therapy (Resolved) [Z79.01]  Mechanical AV Replacement 1992 -- on Warfarin  [Z95.2]           Comments:           Anticoagulation Care Providers     Provider Role Specialty Phone number    Obdulio Ingram MD Referring Internal Medicine [phone]

## 2022-03-21 ENCOUNTER — HOSPITAL ENCOUNTER (EMERGENCY)
Facility: CLINIC | Age: 67
Discharge: HOME | End: 2022-03-21
Attending: EMERGENCY MEDICINE | Admitting: EMERGENCY MEDICINE
Payer: COMMERCIAL

## 2022-03-21 ENCOUNTER — NURSE TRIAGE (OUTPATIENT)
Dept: NURSING | Facility: CLINIC | Age: 67
End: 2022-03-21
Payer: MEDICARE

## 2022-03-21 VITALS
HEART RATE: 75 BPM | DIASTOLIC BLOOD PRESSURE: 94 MMHG | TEMPERATURE: 97 F | OXYGEN SATURATION: 98 % | SYSTOLIC BLOOD PRESSURE: 155 MMHG | RESPIRATION RATE: 16 BRPM

## 2022-03-21 DIAGNOSIS — S01.552A OPEN WOUND OF TONGUE DUE TO BITE: ICD-10-CM

## 2022-03-21 LAB
BASOPHILS # BLD AUTO: 0 10E3/UL (ref 0–0.2)
BASOPHILS NFR BLD AUTO: 1 %
EOSINOPHIL # BLD AUTO: 0.2 10E3/UL (ref 0–0.7)
EOSINOPHIL NFR BLD AUTO: 3 %
ERYTHROCYTE [DISTWIDTH] IN BLOOD BY AUTOMATED COUNT: 14.1 % (ref 10–15)
HCT VFR BLD AUTO: 45.7 % (ref 40–53)
HGB BLD-MCNC: 15.2 G/DL (ref 13.3–17.7)
HOLD SPECIMEN: NORMAL
HOLD SPECIMEN: NORMAL
IMM GRANULOCYTES # BLD: 0 10E3/UL
IMM GRANULOCYTES NFR BLD: 0 %
INR PPP: 2.78 (ref 0.85–1.15)
LYMPHOCYTES # BLD AUTO: 1.8 10E3/UL (ref 0.8–5.3)
LYMPHOCYTES NFR BLD AUTO: 34 %
MCH RBC QN AUTO: 32.1 PG (ref 26.5–33)
MCHC RBC AUTO-ENTMCNC: 33.3 G/DL (ref 31.5–36.5)
MCV RBC AUTO: 97 FL (ref 78–100)
MONOCYTES # BLD AUTO: 0.6 10E3/UL (ref 0–1.3)
MONOCYTES NFR BLD AUTO: 12 %
NEUTROPHILS # BLD AUTO: 2.6 10E3/UL (ref 1.6–8.3)
NEUTROPHILS NFR BLD AUTO: 50 %
NRBC # BLD AUTO: 0 10E3/UL
NRBC BLD AUTO-RTO: 0 /100
PLATELET # BLD AUTO: 234 10E3/UL (ref 150–450)
RBC # BLD AUTO: 4.73 10E6/UL (ref 4.4–5.9)
WBC # BLD AUTO: 5.2 10E3/UL (ref 4–11)

## 2022-03-21 PROCEDURE — 85610 PROTHROMBIN TIME: CPT | Performed by: EMERGENCY MEDICINE

## 2022-03-21 PROCEDURE — 250N000009 HC RX 250: Performed by: EMERGENCY MEDICINE

## 2022-03-21 PROCEDURE — 36415 COLL VENOUS BLD VENIPUNCTURE: CPT | Performed by: EMERGENCY MEDICINE

## 2022-03-21 PROCEDURE — 99283 EMERGENCY DEPT VISIT LOW MDM: CPT

## 2022-03-21 PROCEDURE — 85025 COMPLETE CBC W/AUTO DIFF WBC: CPT | Performed by: EMERGENCY MEDICINE

## 2022-03-21 RX ORDER — TRANEXAMIC ACID 100 MG/ML
INJECTION, SOLUTION INTRAVENOUS ONCE
Status: COMPLETED | OUTPATIENT
Start: 2022-03-21 | End: 2022-03-21

## 2022-03-21 RX ADMIN — TRANEXAMIC ACID 1000 MG: 1 INJECTION, SOLUTION INTRAVENOUS at 15:45

## 2022-03-21 ASSESSMENT — ENCOUNTER SYMPTOMS
DIZZINESS: 0
BRUISES/BLEEDS EASILY: 1

## 2022-03-21 NOTE — DISCHARGE INSTRUCTIONS
No straws or hot foods next 24 hours.   Try to stick to a soft diet in this time as well.   Cold foods ok as this should help minimize chance of recurrent bleeding.   Return to emergency department for recurrent bleeding, or for any other concerns.

## 2022-03-21 NOTE — ED PROVIDER NOTES
History     Chief Complaint:  Wound Check       HPI   Todd S Aschoff is a 66 year old male who presents with bleeding tongue wound. Pt bit his tongue about an hour and a half before presentation. He was unable to get bleeding to stop on its own. He arrives holding a piece of gauze to his tongue. He is on coumadin due to needing a mechanical heart valve. He reports his last INR was supratherapeutic. No dizziness, LOC, or other symptoms.     Allergies:  Blood Transfusion Related (Informational Only)  Gabapentin     Medications:    acetaminophen (TYLENOL) 500 MG tablet  aspirin (ASA) 81 MG chewable tablet  cetirizine (ZYRTEC) 10 MG tablet  diazepam (VALIUM) 5 MG tablet  docusate sodium (COLACE) 100 MG capsule  donepezil (ARICEPT) 10 MG tablet  folic acid (FOLVITE) 1 MG tablet  guanFACINE (TENEX) 1 MG tablet  levothyroxine (SYNTHROID/LEVOTHROID) 150 MCG tablet  Lidocaine (LIDOCARE) 4 % Patch  methocarbamol (ROBAXIN) 500 MG tablet  MYRBETRIQ 50 MG 24 hr tablet  oxyCODONE (ROXICODONE) 5 MG tablet  pregabalin (LYRICA) 100 MG capsule  propafenone (RYTHMOL) 150 MG TABS tablet  senna-docusate (SENOKOT-S/PERICOLACE) 8.6-50 MG tablet  simvastatin (ZOCOR) 40 MG tablet  venlafaxine (EFFEXOR-XR) 75 MG 24 hr capsule  warfarin ANTICOAGULANT (COUMADIN) 2.5 MG tablet      Past Medical History:    Past Medical History:   Diagnosis Date     Advanced directives, counseling/discussion 1/6/2012     Alcohol dependence (H)      Alcohol dependence in remission (H) 8/2/2018     Allergy, unspecified not elsewhere classified      Anemia, unspecified type 1/22/2021     Aortic valve prosthesis present 2/8/2021     Atrial fibrillation (H)      Benign prostatic hyperplasia 2/8/2021     Bilateral thoracic back pain 11/16/2016     BPH (benign prostatic hypertrophy)      Chronic atrial fibrillation (H) 8/7/2002     Chronic infection      Chronic low back pain 8/19/2011     Chronic pain      Depressive disorder 6/9/2010     Dissection of aorta,  thoracic (H) 1992     Elevated prostate specific antigen (PSA) 1/22/2020     Family history of prostate cancer 1/22/2020     Generalized muscle weakness 1/22/2021     Headache(784.0)      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     History of spinal cord injury      Hyperlipidemia LDL goal <130 10/31/2010     Hypotension, unspecified hypotension type 1/22/2021     Hypothyroidism 8/7/2002     Leg wound, left 1/9/2021     Long term current use of anticoagulant therapy 8/7/2002     Low back pain      Lumbar radiculopathy 4/3/2013     Lumbar surgical wound fluid collection 5/23/2013     Major depression      Memory difficulties 1/16/2015     Mixed hyperlipidemia      Morbid obesity (H) 4/20/2010     Numbness and tingling      OA (osteoarthritis)      OAB (overactive bladder) 5/11/2015     Obesity, unspecified      Persons encountering health services in other specified circumstances 4/3/2012     Polypharmacy 1/22/2021     Prostate infection      Radiculitis 4/20/2010     Recurrent major depressive episodes, in full remission (H) 10/30/2012     Renal insufficiency 1/22/2021     Rotator cuff tear 1/26/2015     S/P lumbar fusion 4/5/2018     S/P [employer] AV replacement 1992     Sciatica 2002     Spinal stenosis of lumbar region 4/5/2018     Strabismus (Duane Syndrome)      Suicide attempt by multiple drug overdose, initial encounter (H) 11/27/2020     Tourette syndrome 10/30/2012     Unspecified hypothyroidism        Past Surgical History:    Past Surgical History:   Procedure Laterality Date     AORTIC VALVE REPLACEMENT  1992    [employer] valve     APPLY WOUND VAC Left 1/26/2021    Procedure: Placement of negative pressure wound therapy 2,400 squared centimeters  ;  Surgeon: Lazaro Plascencia MD;  Location: [facility] OR     CATARACT IOL, RT/LT      rt eye only     CHOLECYSTECTOMY, LAPOROSCOPIC  8/10     CLOSE SECONDARY WOUND LOWER EXTREMITY Left 2/3/2021    Procedure:  Split Thickness Skin Graft to Leg  of 18 square centimeters  Intermediate Closure of Leg of 8cm   ;  Surgeon: Lazaro Plascencia MD;  Location: [facility] OR     COLONOSCOPY N/A 1/15/2015    Procedure: COLONOSCOPY;  Surgeon: Johnson Kothari MD;  Location: [facility] GI     DECOMPRESSION LUMBAR ONE LEVEL  4/3/2013    Procedure: DECOMPRESSION LUMBAR ONE LEVEL;  Open Decompression L3-4 bilateral;  Surgeon: Travis Coffey MD;  Location: [facility] OR     DECOMPRESSION, FUSION CERVICAL ANTERIOR ONE LEVEL, COMBINED  3/23/2012    Procedure:COMBINED DECOMPRESSION, FUSION CERVICAL ANTERIOR ONE LEVEL; Anterior Cervical Decompression and Fusion C4-6; Surgeon:TRAVIS COFFEY; Location:[facility] OR     ELBOW SURGERY       EXPLORE SPINE, REMOVE HARDWARE, COMBINED  5/23/2013    Procedure: COMBINED EXPLORE SPINE, REMOVE HARDWARE;  Exploration Lumbar Wound for fluid collection;  Surgeon: Travis Coffey MD;  Location: [facility]     FUSION CERVICAL ANTERIOR TWO LEVELS  3/26/2012    Procedure:FUSION CERVICAL ANTERIOR TWO LEVELS; Anterior Cervical Fusion C4-6, Anterior Cervical  Hematoma Evacuation; Surgeon:TRAVIS COFFEY; Location:[facility] OR     IR LUMBAR EPIDURAL STEROID INJECTION  3/28/2003     IRRIGATION AND DEBRIDEMENT LOWER EXTREMITY, COMBINED Left 1/10/2021    Procedure: 1.  Irrigation and excisional debridement to muscle left distal medial calf chronic wounds total area measuring 9 cm x 4 cm 2.  Irrigation and excisional debridement to fascia left medial calf chronic hematoma measuring 29 x 6.7 x 4.2 cm 3.  Primary complex wound closure left medial distal calf 9 cm in length;  Surgeon: Rod Kemp MD;  Location: [facility]     IRRIGATION AND DEBRIDEMENT LOWER EXTREMITY, COMBINED Left 1/26/2021    Procedure: Evacuation of hematoma debridement of skin, subcutaneous tissue and muscle 2,400 squared centimeters, placement of negative pressure wound therapy 2,400 squared centimeters;  Surgeon: Lazaro Plascencia MD;  Location: [facility]     IRRIGATION AND DEBRIDEMENT SPINE, CLOSE WOUND,  COMBINED  3/26/2012    Procedure:COMBINED IRRIGATION AND DEBRIDEMENT SPINE, CLOSE WOUND; Surgeon:TRAVIS COFFEY; Location:[facility]     OTHER SURGICAL HISTORY      CO UNLISTED ORBIT     OTHER SURGICAL HISTORY      CO UNLISTED SPINE     OTHER SURGICAL HISTORY      CO UNLISTED NECK/THORAX     OTHER SURGICAL HISTORY      (IA) CO TOTAL HIP ARTHROPLASTY     OTHER SURGICAL HISTORY      (IA) CO NEE SCOPE MED W LAT MENISCECT WWO DEBRIBE/SHAVE ANY CO     removal of cyst of back   2.5week     TONSILLECTOMY       wisdom teeth[       [facility] NONSPECIFIC PROCEDURE  1992    repair of TAA with graft     [facility] TOTAL HIP ARTHROPLASTY  2010    Left AMANDA     [facility] TOTAL HIP ARTHROPLASTY  2002    R hip replacement comp's by nerve injury with pain down into R leg, nadya below knee     Social History:   reports that he has never smoked. He has never used smokeless tobacco. He reports that he does not drink alcohol and does not use drugs.    PCP: Herve Morgan     Review of Systems   HENT: Negative for nosebleeds.         Tongue bleeding   Neurological: Negative for dizziness and syncope.   Hematological: Bruises/bleeds easily.   All other systems reviewed and are negative.        Physical Exam     Patient Vitals for the past 24 hrs:   BP Temp Pulse Resp SpO2   03/21/22 1426 (!) 155/94 97  F (36.1  C) 75 16 98 %        Physical Exam  Head:  The scalp, face, and head appear normal  Eyes:  Conjunctivae are normal  ENT:    The nose is normal    Pinnae are normal    Small <5mm laceration to tip of tongue actively oozing venous blood.   Neck:  Trachea midline  CV:  Normal rate  Resp:  No respiratory distress   Musc:  Normal muscular tone  Skin:  No rash or lesions noted  Neuro: Speech is normal and fluent. Face is symmetric. Moving all extremities well.   Psych:  Awake. Alert.  Normal affect.  Appropriate interactions.      Emergency Department Course         Laboratory:  Labs Ordered and Resulted from Time of ED Arrival to Time of ED Departure   INR -  Abnormal       Result Value    INR 2.78 (*)    CBC WITH PLATELETS AND DIFFERENTIAL    WBC Count 5.2      RBC Count 4.73      Hemoglobin 15.2      Hematocrit 45.7      MCV 97      MCH 32.1      MCHC 33.3      RDW 14.1      Platelet Count 234      % Neutrophils 50      % Lymphocytes 34      % Monocytes 12      % Eosinophils 3      % Basophils 1      % Immature Granulocytes 0      NRBCs per 100 WBC 0      Absolute Neutrophils 2.6      Absolute Lymphocytes 1.8      Absolute Monocytes 0.6      Absolute Eosinophils 0.2      Absolute Basophils 0.0      Absolute Immature Granulocytes 0.0      Absolute NRBCs 0.0          Interventions:    Topical TXA applied directly to tongue wound.       Impression & Plan       Medical Decision Making:  Patient presents with tongue wound suffered at home that one-stop bleeding.  Patient is anticoagulated on Coumadin.  His INR is therapeutic.  His platelet levels are normal.  After treatment with topical TXA, bleeding has stopped.  Patient was monitored for an additional 30 minutes without any return of bleeding.  Recommended that he avoid hot foods as well as straws for at least the next 24 to 48 hours.  Encouraged him to return to the emergency department for any recurrent bleeding. He is in stable condition at the time of discharge, indications for return to the ED were discussed as well as follow up. All questions were answered and he is in agreement with the plan.        Diagnosis:    ICD-10-CM    1. Open wound of tongue due to bite  S01.552A                Marcello Aly MD  03/22/22 3852

## 2022-03-21 NOTE — TELEPHONE ENCOUNTER
"Pt reports \"bit tongue\" 45 mins ago.  \"Can't stop the bleeding.\"  Applied ice and pressure with no resolution.  Takes warfarin.  Most recent INR was \"very thin\".    Advised immediate ED.  Discussed calling 911.  Pt chooses to have his wife drive him now.  States preference for [facility].    Patti TUCKER Health Nurse Advisor     Reason for Disposition    Major bleeding (actively dripping or spurting) that can't be stopped    Protocols used: MOUTH INJURY-A-OH    _____________________    COVID 19 Nurse Triage Plan/Patient Instructions    Please be aware that novel coronavirus (COVID-19) may be circulating in the community. If you develop symptoms such as fever, cough, or SOB or if you have concerns about the presence of another infection including coronavirus (COVID-19), please contact your health care provider or visit [email or web address].     Disposition/Instructions    Additional COVID19 information to add for patients.   How can I protect others?  If you have symptoms (fever, cough, body aches or trouble breathing): Stay home and away from others (self-isolate) until:    At least 10 days have passed since your symptoms started, And     You ve had no fever--and no medicine that reduces fever--for 1 full day (24 hours), And      Your other symptoms have resolved (gotten better).     If you don t have symptoms, but a test showed that you have COVID-19 (you tested positive):    Stay home and away from others (self-isolate). Follow the tips under \"How do I self-isolate?\" below for 10 days (20 days if you have a weak immune system).    You don't need to be retested for COVID-19 before going back to school or work. As long as you're fever-free and feeling better, you can go back to school, work and other activities after waiting the 10 or 20 days.     How do I self-isolate?    Stay in your own room, even for meals. Use your own bathroom if you can.     Stay away from others in your home. No hugging, " kissing or shaking hands. No visitors.    Don t go to work, school or anywhere else.     Clean  high touch  surfaces often (doorknobs, counters, handles, etc.). Use a household cleaning spray or wipes. You ll find a full list on the EPA website:  www.epa.gov/pesticide-registration/list-n-disinfectants-use-against-sars-cov-2.    Cover your mouth and nose with a mask, tissue or washcloth to avoid spreading germs.    Wash your hands and face often. Use soap and water.    Caregivers in these groups are at risk for severe illness due to COVID-19:  o People 65 years and older  o People who live in a nursing home or long-term care facility  o People with chronic disease (lung, heart, cancer, diabetes, kidney, liver, immunologic)  o People who have a weakened immune system, including those who:  - Are in cancer treatment  - Take medicine that weakens the immune system, such as corticosteroids  - Had a bone marrow or organ transplant  - Have an immune deficiency  - Have poorly controlled HIV or AIDS  - Are obese (body mass index of 40 or higher)  - Smoke regularly    Caregivers should wear gloves while washing dishes, handling laundry and cleaning bedrooms and bathrooms.    Use caution when washing and drying laundry: Don t shake dirty laundry, and use the warmest water setting that you can.    For more tips, go to www.cdc.gov/coronavirus/2019-ncov/downloads/10Things.pdf.    How can I take care of myself?  1. Get lots of rest. Drink extra fluids (unless a doctor has told you not to).     2. Take Tylenol (acetaminophen) for fever or pain. If you have liver or kidney problems, ask your family doctor if it s okay to take Tylenol.     Adults can take either:     650 mg (two 325 mg pills) every 4 to 6 hours, or     1,000 mg (two 500 mg pills) every 8 hours as needed.     Note: Don t take more than 3,000 mg in one day.   Acetaminophen is found in many medicines (both prescribed and over-the-counter medicines). Read all labels to  be sure you don t take too much.     For children, check the Tylenol bottle for the right dose. The dose is based on the child s age or weight.    3. If you have other health problems (like cancer, heart failure, an organ transplant or severe kidney disease): Call your specialty clinic if you don t feel better in the next 2 days.    4. Know when to call 911: Emergency warning signs include:    Trouble breathing or shortness of breath    Pain or pressure in the chest that doesn t go away    Feeling confused like you haven t felt before, or not being able to wake up    Bluish-colored lips or face    What are the symptoms of COVID-19?     The most common symptoms are cough, fever and trouble breathing.     Less common symptoms include body aches, chills, diarrhea (loose, watery poops), fatigue (feeling very tired), headache, runny nose, sore throat and loss of smell.    COVID-19 can cause severe coughing (bronchitis) and lung infection (pneumonia).    How does it spread?     The virus may spread when a person coughs or sneezes into the air. The virus can travel about 6 feet this way, and it can live on surfaces.      Common  (household disinfectants) will kill the virus.    Who is at risk?  Anyone can catch COVID-19 if they re around someone who has the virus.    How can others protect themselves?     Stay away from people who have COVID-19 (or symptoms of COVID-19).    Wash hands often with soap and water. Or, use hand  with at least 60% alcohol.    Avoid touching the eyes, nose or mouth.     Wear a face mask when you go out in public, when sick or when caring for a sick person.    Where can I get more information?     [employer] [place]: About COVID-19: [email or web address]    CDC: What to Do If You re Sick: www.cdc.gov/coronavirus/2019-ncov/about/steps-when-sick.html    CDC: Ending Home Isolation: www.cdc.gov/coronavirus/2019-ncov/hcp/disposition-in-home-patients.html     CDC: Caring for  Someone: www.cdc.gov/coronavirus/2019-ncov/if-you-are-sick/care-for-someone.html     [facility]: Interim Guidance for Hospital Discharge to Home: www.[facility]./diseases/coronavirus/hcp/hospdischarge.pdf    [facility] clinical trials (COVID-19 research studies): clinicalaffairs.[facility]-clinical-trials     Below are the COVID-19 hotlines at the [place] [facility] ([facility]). Interpreters are available.   o For health questions: Call 917-851-3846 or 1-361.844.4891 (7 a.m. to 7 p.m.)  o For questions about schools and childcare: Call 271-486-4129 or 1-636.215.8791 (7 a.m. to 7 p.m.)          Thank you for taking steps to prevent the spread of this virus.  o Limit your contact with others.  o Wear a simple mask to cover your cough.  o Wash your hands well and often.    Resources    [facility]: About COVID-19: [email or web address]    CDC: What to Do If You're Sick: www.cdc.gov/coronavirus/2019-ncov/about/steps-when-sick.html    CDC: Ending Home Isolation: www.cdc.gov/coronavirus/2019-ncov/hcp/disposition-in-home-patients.html     CDC: Caring for Someone: www.cdc.gov/coronavirus/2019-ncov/if-you-are-sick/care-for-someone.html     [facility]: Interim Guidance for Hospital Discharge to Home: www.[facility]./diseases/coronavirus/hcp/hospdischarge.pdf    [facility] clinical trials (COVID-19 research studies): clinicalaffairs.[facility]-clinical-trials     Below are the COVID-19 hotlines at the [place] [facility] ([facility]). Interpreters are available.   o For health questions: Call 219-596-8191 or 1-114.664.8651 (7 a.m. to 7 p.m.)  o For questions about schools and childcare: Call 078-523-3724 or 1-148.732.8137 (7 a.m. to 7 p.m.)

## 2022-03-21 NOTE — ED TRIAGE NOTES
Patient presents to the ED stating that he bit the tip of his tongue 1 hour ago and has been unable to get the bleeding stopped. Is on warfarin. States last INR was 4.1.

## 2022-03-22 ENCOUNTER — DOCUMENTATION ONLY (OUTPATIENT)
Dept: ANTICOAGULATION | Facility: CLINIC | Age: 67
End: 2022-03-22
Payer: MEDICARE

## 2022-03-22 ENCOUNTER — PATIENT OUTREACH (OUTPATIENT)
Dept: CARE COORDINATION | Facility: CLINIC | Age: 67
End: 2022-03-22
Payer: MEDICARE

## 2022-03-22 DIAGNOSIS — Z95.2 AORTIC VALVE PROSTHESIS PRESENT: ICD-10-CM

## 2022-03-22 DIAGNOSIS — Z71.89 OTHER SPECIFIED COUNSELING: ICD-10-CM

## 2022-03-22 DIAGNOSIS — I48.20 CHRONIC ATRIAL FIBRILLATION (H): ICD-10-CM

## 2022-03-22 NOTE — PROGRESS NOTES
Clinic Care Coordination Contact  [facility]/Voicemail       Clinical Data: Care Coordinator Outreach  Outreach attempted x 1.  Unable to leave a message on patient's voicemail with call back information and requested return call.  Plan: Care Coordinator will try to reach patient again in 1-2 business days.    .Keerthi SHINE Community Health Worker  Clinic Care Coordination  [facility]  Phone: [phone]

## 2022-03-22 NOTE — PROGRESS NOTES
ANTICOAGULATION  MANAGEMENT: Discharge Review    Todd S Aschoff chart reviewed for anticoagulation continuity of care    Emergency room visit on 3/21/22 for wound on tongue from bite.    Discharge disposition: Home    Results:    Recent labs: (last 7 days)     03/17/22  1049 03/21/22  1555   INR 4.0* 2.78*     Anticoagulation inpatient management:     not applicable     Anticoagulation discharge instructions:     Warfarin dosing: home regimen continued   Bridging: No   INR goal change: No      Medication changes affecting anticoagulation: No    Additional factors affecting anticoagulation: will need to eat a soft diet.    Plan     No adjustment to anticoagulation plan needed    Patient not contacted.  Patient has INR scheduled on 3/25/22    No adjustment to Anticoagulation Calendar was required    Amanda Wheeler RN

## 2022-03-23 RX ORDER — WARFARIN SODIUM 5 MG/1
TABLET ORAL
Qty: 90 TABLET | Refills: 0 | Status: SHIPPED | OUTPATIENT
Start: 2022-03-23 | End: 2022-06-30

## 2022-03-23 NOTE — PROGRESS NOTES
Clinic Care Coordination Contact  [facility]/Voicemail       Clinical Data: Care Coordinator Outreach  Outreach attempted x 2.  Left message on patient's voicemail with call back information and requested return call.  Plan:  Care Coordinator will do no further outreaches at this time.    Juanita Chamorro  Community Health Worker  [facility]  Ph:[phone]

## 2022-03-23 NOTE — TELEPHONE ENCOUNTER
"Requested Prescriptions   Pending Prescriptions Disp Refills     warfarin ANTICOAGULANT (COUMADIN) 5 MG tablet [Pharmacy Med Name: Warfarin Sodium Oral Tablet 5 MG] 95 tablet 0     Sig: TAKE 1&1/2 TABLETS (7.5MG) BY MOUTH EVERY SUNDAY AND WEDNESDAY AND  TAKE 1 TABLET (5 MG) ALL OTHER DAYS OR AS DIRECTED BY [facility] TEAM.       Vitamin K Antagonists Failed - 3/22/2022 10:03 AM        Failed - INR is within goal in the past 6 weeks     Confirm INR is within goal in the past 6 weeks.     Recent Labs   Lab Test 03/21/22  1555   INR 2.78*                       Passed - Recent (12 mo) or future (30 days) visit within the authorizing provider's specialty     Patient has had an office visit with the authorizing provider or a provider within the authorizing providers department within the previous 12 mos or has a future within next 30 days. See \"Patient Info\" tab in inbasket, or \"Choose Columns\" in Meds & Orders section of the refill encounter.              Passed - Medication is active on med list        Passed - Patient is 18 years of age or older           Last office visit 3/7/22. Warfarin dosing is managed by INR Clinic.  Prescription approved per [facility] Refill Protocol.    "

## 2022-03-24 ENCOUNTER — TELEPHONE (OUTPATIENT)
Dept: PEDIATRICS | Facility: CLINIC | Age: 67
End: 2022-03-24
Payer: MEDICARE

## 2022-03-24 ENCOUNTER — HOSPITAL ENCOUNTER (OUTPATIENT)
Dept: MRI IMAGING | Facility: CLINIC | Age: 67
Discharge: HOME | End: 2022-03-24
Attending: PHYSICIAN ASSISTANT | Admitting: PHYSICIAN ASSISTANT
Payer: COMMERCIAL

## 2022-03-24 DIAGNOSIS — F32.A DEPRESSION, UNSPECIFIED DEPRESSION TYPE: ICD-10-CM

## 2022-03-24 DIAGNOSIS — D48.5 NEOPLASM OF UNCERTAIN BEHAVIOR OF SKIN: ICD-10-CM

## 2022-03-24 PROCEDURE — 72158 MRI LUMBAR SPINE W/O & W/DYE: CPT

## 2022-03-24 PROCEDURE — 255N000002 HC RX 255 OP 636: Performed by: PHYSICIAN ASSISTANT

## 2022-03-24 PROCEDURE — A9585 GADOBUTROL INJECTION: HCPCS | Performed by: PHYSICIAN ASSISTANT

## 2022-03-24 RX ORDER — GADOBUTROL 604.72 MG/ML
14 INJECTION INTRAVENOUS ONCE
Status: COMPLETED | OUTPATIENT
Start: 2022-03-24 | End: 2022-03-24

## 2022-03-24 RX ADMIN — GADOBUTROL 14 ML: 604.72 INJECTION INTRAVENOUS at 16:03

## 2022-03-24 NOTE — TELEPHONE ENCOUNTER
Per chart review, 90 day Warfarin prescription refill was signed yesterday and sent to the [facility] pharmacy in [place] on [address].     Telephone call placed to patient, left a voice message with request for return call.    When the patient calls back:  -please inform the patient that Warfarin prescription refill has been sent to [facility] in [place] on [address]     [place] RN on 3/24/2022 at 8:43 AM

## 2022-03-24 NOTE — TELEPHONE ENCOUNTER
Pt called LVM on PAL4 direct line. Pt reordered refill of Warfarin on Friday and still has to been refilled. He is now out. Wants a call back at [phone]. Routing to PAL3    Soco Orlando, EMT at 8:31 AM on March 24, 2022  [facility] Health Guide  [phone]

## 2022-03-25 ENCOUNTER — LAB (OUTPATIENT)
Dept: LAB | Facility: CLINIC | Age: 67
End: 2022-03-25
Payer: COMMERCIAL

## 2022-03-25 ENCOUNTER — ANTICOAGULATION THERAPY VISIT (OUTPATIENT)
Dept: ANTICOAGULATION | Facility: CLINIC | Age: 67
End: 2022-03-25

## 2022-03-25 DIAGNOSIS — I48.0 PAF (PAROXYSMAL ATRIAL FIBRILLATION) (H): Primary | ICD-10-CM

## 2022-03-25 DIAGNOSIS — Z95.2 AORTIC VALVE PROSTHESIS PRESENT: ICD-10-CM

## 2022-03-25 LAB — INR BLD: 4.9 (ref 0.9–1.1)

## 2022-03-25 PROCEDURE — 85610 PROTHROMBIN TIME: CPT

## 2022-03-25 PROCEDURE — 36416 COLLJ CAPILLARY BLOOD SPEC: CPT

## 2022-03-25 RX ORDER — VENLAFAXINE HYDROCHLORIDE 75 MG/1
225 CAPSULE, EXTENDED RELEASE ORAL DAILY
Qty: 270 CAPSULE | Refills: 0 | Status: SHIPPED | OUTPATIENT
Start: 2022-03-25 | End: 2022-07-01

## 2022-03-25 NOTE — PROGRESS NOTES
ANTICOAGULATION MANAGEMENT     Todd S Aschoff 66 year old male is on warfarin with supratherapeutic INR result. (Goal INR 2.5-3.5)    Recent labs: (last 7 days)     03/25/22  1045   INR 4.9*       ASSESSMENT       Source(s): Chart Review and Patient/Caregiver Call       Warfarin doses taken: Warfarin taken as instructed    Diet: No new diet changes identified. No alcohol intake (hasn't had a drink in 16 years)    New illness, injury, or hospitalization: No    Medication/supplement changes: None noted    Signs or symptoms of bleeding or clotting: No    Previous INR: Therapeutic last visit; previously outside of goal range  Additional findings: Was at ER on Monday. Bit his tongue and couldn't get it to stop bleeding. No bleeding now. Patient denies any identifiable changes that caused the INR to be out of therapeutic range.         PLAN     Recommended plan for temporary change(s) affecting INR     Dosing Instructions: Partial hold then continue your current warfarin dose with next INR in 6 days. If INR in range in 6 days, consider making 2.5 mg on Fridays a permanent maintenance dose change.      Summary  As of 3/25/2022    Full warfarin instructions:  3/25: 2.5 mg; Otherwise 5 mg every day   Next INR check:  3/31/2022             Telephone call with Nicko who agrees to plan and repeated back plan correctly    Lab visit scheduled    Education provided: Goal range and significance of current result, Importance of following up at instructed interval, Monitoring for bleeding signs and symptoms and Contact [phone]  with any changes, questions or concerns.     Plan made per [facility] anticoagulation protocol    Nova John RN  Anticoagulation Clinic  3/25/2022    _______________________________________________________________________     Anticoagulation Episode Summary     Current INR goal:  2.5-3.5   TTR:  58.2 % (11.8 mo)   Target end date:  Indefinite   Send INR reminders to:  AIDA Pritchett    PAF  (paroxysmal atrial fibrillation) (H) [I48.0]  Long term current use of anticoagulant therapy (Resolved) [Z79.01]  Mechanical AV Replacement 1992 -- on Warfarin  [Z95.2]           Comments:           Anticoagulation Care Providers     Provider Role Specialty Phone number    Obdulio Ingram MD Referring Internal Medicine [phone]

## 2022-03-25 NOTE — TELEPHONE ENCOUNTER
Routing refill request to provider for review/approval because:  Labs out of range:  cr  BP out of range    Devorah Nieves RN on 3/25/2022 at 3:00 PM

## 2022-03-30 ENCOUNTER — TELEPHONE (OUTPATIENT)
Dept: NEUROSURGERY | Facility: CLINIC | Age: 67
End: 2022-03-30
Payer: MEDICARE

## 2022-03-30 DIAGNOSIS — F33.1 MAJOR DEPRESSIVE DISORDER, RECURRENT EPISODE, MODERATE (H): ICD-10-CM

## 2022-03-30 NOTE — TELEPHONE ENCOUNTER
Patient would like a call back to go over his MRI and next steps. Please call him back at [phone]. Thank you~

## 2022-03-31 ENCOUNTER — LAB (OUTPATIENT)
Dept: LAB | Facility: CLINIC | Age: 67
End: 2022-03-31
Payer: COMMERCIAL

## 2022-03-31 ENCOUNTER — ANTICOAGULATION THERAPY VISIT (OUTPATIENT)
Dept: ANTICOAGULATION | Facility: CLINIC | Age: 67
End: 2022-03-31

## 2022-03-31 DIAGNOSIS — Z95.2 AORTIC VALVE PROSTHESIS PRESENT: ICD-10-CM

## 2022-03-31 DIAGNOSIS — I48.0 PAF (PAROXYSMAL ATRIAL FIBRILLATION) (H): Primary | ICD-10-CM

## 2022-03-31 LAB — INR BLD: 3.9 (ref 0.9–1.1)

## 2022-03-31 PROCEDURE — 85610 PROTHROMBIN TIME: CPT

## 2022-03-31 PROCEDURE — 36416 COLLJ CAPILLARY BLOOD SPEC: CPT

## 2022-03-31 NOTE — TELEPHONE ENCOUNTER
Patient has reportedly changed clinics to [facility]. PCP listed as being Dr Morgan.  Will forward Rx.      Needs appt with his primary care provider (patient's choice) soon.

## 2022-03-31 NOTE — PROGRESS NOTES
ANTICOAGULATION MANAGEMENT     Todd S Aschoff 66 year old male is on warfarin with supratherapeutic INR result. (Goal INR 2.5-3.5)    Recent labs: (last 7 days)     03/31/22  1058   INR 3.9*       ASSESSMENT       Source(s): Chart Review and Patient/Caregiver Call       Warfarin doses taken: Warfarin taken as instructed    Diet: No new diet changes identified    New illness, injury, or hospitalization: No    Medication/supplement changes: None noted    Signs or symptoms of bleeding or clotting: No    Previous INR: Supratherapeutic.  INR has been consistently elevated, so maintenance dose was lowered today.    Additional findings: None       PLAN     Recommended plan for no diet, medication or health factor changes affecting INR     Dosing Instructions: decrease your warfarin dose (10% change) with next INR in 1 week       Summary  As of 3/31/2022    Full warfarin instructions:  3.75 mg every Tue, Thu, Sat; 5 mg all other days   Next INR check:  4/7/2022             Telephone call with Nicko who agrees to plan and repeated back plan correctly    Lab visit scheduled    Education provided: Please call back if any changes to your diet, medications or how you've been taking warfarin    Plan made per [facility] anticoagulation protocol    Amanda Wheeler RN  Anticoagulation Clinic  3/31/2022    _______________________________________________________________________     Anticoagulation Episode Summary     Current INR goal:  2.5-3.5   TTR:  56.5 % (11.8 mo)   Target end date:  Indefinite   Send INR reminders to:  AIDA GUERRERO    Indications    PAF (paroxysmal atrial fibrillation) (H) [I48.0]  Long term current use of anticoagulant therapy (Resolved) [Z79.01]  Mechanical AV Replacement 1992 -- on Warfarin  [Z95.2]           Comments:           Anticoagulation Care Providers     Provider Role Specialty Phone number    Obdulio Ingram MD Referring Internal Medicine [phone]

## 2022-03-31 NOTE — PROGRESS NOTES
ANTICOAGULATION MANAGEMENT     Todd S Aschoff 66 year old male is on warfarin with supratherapeutic INR result. (Goal INR 2.5-3.5)    Recent labs: (last 7 days)     03/31/22  1058   INR 3.9*       ASSESSMENT       Source(s): Chart Review    Previous INR was Supratherapeutic.  Partial hold done at last check.  May need to adjust maintenance dose if no changes reported by patient on assessment. Would consider 3.75 mg Tu, Th, Sa and 5 mg all other days (~10% decrease)    Medication, diet, health changes since last INR chart reviewed; none identified           PLAN     Left message for patient to call INR Clinic to discuss results.

## 2022-03-31 NOTE — TELEPHONE ENCOUNTER
Routing refill request to provider for review/approval because:  Needs provider review.      Last appointment with provider was 6/26/22.  Patient also seeing Dr Morgan and is listed as primary care provider.

## 2022-03-31 NOTE — TELEPHONE ENCOUNTER
Patient called times 2 for results.  Provider not available.  Patient would like any provider to call with results, anxiously waiting.

## 2022-04-01 RX ORDER — VENLAFAXINE HYDROCHLORIDE 150 MG/1
300 CAPSULE, EXTENDED RELEASE ORAL DAILY
Qty: 180 CAPSULE | Refills: 0 | OUTPATIENT
Start: 2022-04-01

## 2022-04-01 NOTE — TELEPHONE ENCOUNTER
Contacted patient to discuss lumbar MRI results. He states he has questions specifically for Oleg and Dr. Norris and would like them to contact him next week with MRI results and further recommendations.

## 2022-04-04 NOTE — TELEPHONE ENCOUNTER
Spoke with patient. Patient is taking   Name from pharmacy: Venlafaxine HCl ER Oral Capsule Extended Release 24 Hour 150 MG          Will file in chart as: venlafaxine (EFFEXOR-XR) 150 MG 24 hr capsule    Sig: Take 2 capsules (300 mg) by mouth daily    Disp:  180 capsule    Refills:  0    Start: 4/1/2022    Class: E-Prescribe    Refused by: Lito Boyle Mai, MD    Refusal reason: Duplicate    For: Major depressive disorder, recurrent episode, moderate (H)     However we sent in   venlafaxine (EFFEXOR-XR) 75 MG 24 hr capsule 270 capsule 0 3/25/2022  No   Sig - Route: Take 3 capsules (225 mg) by mouth daily - Oral   Sent to pharmacy as: Venlafaxine HCl ER 75 MG Oral Capsule Extended Release 24 Hour (EFFEXOR-XR)   Class: E-Prescribe   Order: [id number]   E-Prescribing Status: Receipt confirmed by pharmacy (3/25/2022  3:33 PM CDT)     Patient will take the 75mg until he is out, but in the future her would like 150 mg twice daily    Otilia Hurtado CMA

## 2022-04-04 NOTE — TELEPHONE ENCOUNTER
Called the patient.     Relayed below note from Dr. Morgan to the patient.     Patient verbalized understanding.   Patient picked up 90 day supply from 3/25/22 script.    North Noel RN on 4/4/2022 at 2:47 PM

## 2022-04-04 NOTE — TELEPHONE ENCOUNTER
Let patient know that 300 mg is higher than we are comfortable with.  He should remain on 225 as ordered on 3/25.

## 2022-04-05 NOTE — TELEPHONE ENCOUNTER
"Per Oleg Mayer PA-C: \"Called and left message.  No concerning findings on MRI. Recommend wound care evaluation again. \"     "

## 2022-04-07 ENCOUNTER — ANTICOAGULATION THERAPY VISIT (OUTPATIENT)
Dept: ANTICOAGULATION | Facility: CLINIC | Age: 67
End: 2022-04-07

## 2022-04-07 ENCOUNTER — TELEPHONE (OUTPATIENT)
Dept: NEUROSURGERY | Facility: CLINIC | Age: 67
End: 2022-04-07

## 2022-04-07 ENCOUNTER — LAB (OUTPATIENT)
Dept: LAB | Facility: CLINIC | Age: 67
End: 2022-04-07
Payer: COMMERCIAL

## 2022-04-07 DIAGNOSIS — Z95.2 AORTIC VALVE PROSTHESIS PRESENT: ICD-10-CM

## 2022-04-07 DIAGNOSIS — I48.0 PAF (PAROXYSMAL ATRIAL FIBRILLATION) (H): Primary | ICD-10-CM

## 2022-04-07 LAB — INR BLD: 2.6 (ref 0.9–1.1)

## 2022-04-07 PROCEDURE — 85610 PROTHROMBIN TIME: CPT

## 2022-04-07 PROCEDURE — 36416 COLLJ CAPILLARY BLOOD SPEC: CPT

## 2022-04-07 NOTE — PROGRESS NOTES
ANTICOAGULATION MANAGEMENT     Todd S Aschoff 66 year old male is on warfarin with therapeutic INR result. (Goal INR 2.5-3.5)    Recent labs: (last 7 days)     04/07/22  1105   INR 2.6*       ASSESSMENT       Source(s): Chart Review and Patient/Caregiver Call       Warfarin doses taken: Warfarin taken as instructed    Diet: Increased greens/vitamin K in diet; ongoing change.  Ate spinach this week and also had asparagus.    New illness, injury, or hospitalization: No    Medication/supplement changes: None noted    Signs or symptoms of bleeding or clotting: No    Previous INR: Supratherapeutic.  Maintenance dose was lowered by 10% at last check.  INR decreased significantly, so increased dose slightly today since patient does plan to continue to eat his green veggies.    Additional findings: None       PLAN     Recommended plan for ongoing change(s) affecting INR     Dosing Instructions: Increase your warfarin dose (4% change) with next INR in 1 week       Summary  As of 4/7/2022    Full warfarin instructions:  3.75 mg every Tue, Sat; 5 mg all other days   Next INR check:  4/14/2022             Telephone call with Nicko who agrees to plan and repeated back plan correctly    Lab visit scheduled    Education provided: Please call back if any changes to your diet, medications or how you've been taking warfarin, Impact of vitamin K foods on INR and Importance of therapeutic range    Plan made per [facility] anticoagulation protocol    Amanda Wheeler RN  Anticoagulation Clinic  4/7/2022    _______________________________________________________________________     Anticoagulation Episode Summary     Current INR goal:  2.5-3.5   TTR:  57.3 % (11.8 mo)   Target end date:  Indefinite   Send INR reminders to:  AIDA GUERRERO    Indications    PAF (paroxysmal atrial fibrillation) (H) [I48.0]  Long term current use of anticoagulant therapy (Resolved) [Z79.01]  Mechanical AV Replacement 1992 -- on Warfarin  [Z95.2]           Comments:            Anticoagulation Care Providers     Provider Role Specialty Phone number    Obdulio Ingram MD Referring Internal Medicine [phone]

## 2022-04-07 NOTE — TELEPHONE ENCOUNTER
Per chart review - Oleg Mayer PA-C reviewed MRI and tried to call patient. MRI with no concerning findings. Oleg recommended wound care evaluation, patient is scheduled for wound care appointment on April 22nd.    Called patient to update on above.    Patient voiced understanding and agreement. Patient will call clinic with any further questions or concerns.

## 2022-04-11 ENCOUNTER — OFFICE VISIT (OUTPATIENT)
Dept: ORTHOPEDICS | Facility: CLINIC | Age: 67
End: 2022-04-11
Payer: COMMERCIAL

## 2022-04-11 VITALS
DIASTOLIC BLOOD PRESSURE: 70 MMHG | WEIGHT: 302 LBS | SYSTOLIC BLOOD PRESSURE: 122 MMHG | BODY MASS INDEX: 38.76 KG/M2 | HEIGHT: 74 IN

## 2022-04-11 DIAGNOSIS — M25.512 CHRONIC LEFT SHOULDER PAIN: ICD-10-CM

## 2022-04-11 DIAGNOSIS — M67.819 TENDINOSIS OF ROTATOR CUFF: ICD-10-CM

## 2022-04-11 DIAGNOSIS — E66.9 OBESITY (BMI 30-39.9): ICD-10-CM

## 2022-04-11 DIAGNOSIS — M75.101 NONTRAUMATIC TEAR OF RIGHT ROTATOR CUFF, UNSPECIFIED TEAR EXTENT: Primary | ICD-10-CM

## 2022-04-11 DIAGNOSIS — G89.29 CHRONIC LEFT SHOULDER PAIN: ICD-10-CM

## 2022-04-11 PROCEDURE — 20610 DRAIN/INJ JOINT/BURSA W/O US: CPT | Mod: LT | Performed by: ORTHOPAEDIC SURGERY

## 2022-04-11 PROCEDURE — 99203 OFFICE O/P NEW LOW 30 MIN: CPT | Mod: 25 | Performed by: ORTHOPAEDIC SURGERY

## 2022-04-11 RX ORDER — LIDOCAINE HYDROCHLORIDE 10 MG/ML
4 INJECTION, SOLUTION INFILTRATION; PERINEURAL
Status: DISCONTINUED | OUTPATIENT
Start: 2022-04-11 | End: 2022-05-11

## 2022-04-11 RX ORDER — TESTOSTERONE CYPIONATE 200 MG/ML
2 INJECTION INTRAMUSCULAR
Status: DISCONTINUED | OUTPATIENT
Start: 2022-04-11 | End: 2022-05-11

## 2022-04-11 RX ADMIN — LIDOCAINE HYDROCHLORIDE 4 ML: 10 INJECTION, SOLUTION INFILTRATION; PERINEURAL at 14:01

## 2022-04-11 RX ADMIN — TESTOSTERONE CYPIONATE 2 ML: 200 INJECTION INTRAMUSCULAR at 14:01

## 2022-04-11 NOTE — PATIENT INSTRUCTIONS
1. Rotator cuff tear, right    MRI of RIGHT shoulder has been ordered.   The [facility] team will call you to schedule your imaging exam in the next 1-2 days. You can also call them directly at [facility] [phone] ([address], [address], [place], [place]) to schedule your appointment.       Follow up with Dr. Dejesus in ~1 month, after completion of MRI of shoulder.    Call my office with any questions or concerns, [phone].

## 2022-04-11 NOTE — PROGRESS NOTES
HISTORY OF PRESENT ILLNESS:    Todd S Aschoff is a 66 year old male who is seen as self referral for left shoulder pain. Onset of symptoms 12/2021. He is right hand dominant, and retired.     Present symptoms: left shoulder pain located deep within the joint. He describes pain as dull, aching, sharp and searing.  Weakness of the left shoulder, he notes that due to back pain he is also limited in activities.  Increased pain with lifting, pushing, pulling. Increased pain in the the morning. Pain is improved by Tylenol TID.   Current pain level: 5/10, Worst pain level: 10/10     Treatments tried to this point: heat, and ice Tylenol TID,  Lyrica, diazepam at nighttime   Orthopedic PMH: bilateral AMANDA,  Lumbar fusion, Cervical fusion, Right rotator cuff tear    Past Medical History:   Diagnosis Date     Advanced directives, counseling/discussion 1/6/2012    Discussed Advance Directive planning with patient; information given to patient to review.     Alcohol dependence (H)      Alcohol dependence in remission (H) 8/2/2018     Allergy, unspecified not elsewhere classified     seasonal     Anemia, unspecified type 1/22/2021     Aortic valve prosthesis present 2/8/2021     Atrial fibrillation (H)      Benign prostatic hyperplasia 2/8/2021     Bilateral thoracic back pain 11/16/2016     BPH (benign prostatic hypertrophy)      Chronic atrial fibrillation (H) 8/7/2002     Chronic infection     low back wound incision not healing      Chronic low back pain 8/19/2011     Chronic pain     lower back and right leg and left leg     Depressive disorder 6/9/2010    Depression  NOS     Dissection of aorta, thoracic (H) 1992    [employer] aotic valve + arch graft 1992     Elevated prostate specific antigen (PSA) 1/22/2020     Family history of prostate cancer 1/22/2020     Generalized muscle weakness 1/22/2021     Headache(784.0)      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 Provider to review     Leg wound, left 1/9/2021     Long term current use of anticoagulant therapy 8/7/2002    LW Modifier:  Coumadin, since mechanical aortic valve LW Onset:  1992 ; Anticoagulant Rx Long Term Problem list name updated by automated process. Provider to review     Low back pain      Lumbar radiculopathy 4/3/2013     Lumbar surgical wound fluid collection 5/23/2013     Major depression      Memory difficulties 1/16/2015     Mixed hyperlipidemia      Morbid obesity (H) 4/20/2010    LW Modifier:  BMI 41.3 (Apr 2010) ; Obesity Morbid     Numbness and tingling     right leg post surg rightt hip/ also left leg since surg     OA (osteoarthritis)     hips     OAB (overactive bladder) 5/11/2015     Obesity, unspecified      Persons encountering health services in other specified circumstances 4/3/2012    Formatting of this note might be different from the original. EMERGENCY CARE PLAN Presenting Problem Signs and Symptoms Treatment Plan   Questions or conerns during clinic hours    I will call the clinic directly    Questions or conerns outside clinic hours    I will call the 24 hour nurse line at [phone]   Patient needs to schedule an appointment    I will call the 24 hour scheduling team at     Polypharmacy 1/22/2021     Prostate infection      Radiculitis 4/20/2010    LW Modifier:  Right foot, s/p R AMANDA LW Onset:  2002 ; Neuralgia     Recurrent major depressive episodes, in full remission (H) 10/30/2012     Renal insufficiency 1/22/2021     Rotator cuff tear 1/26/2015    Rotator cuff tear, right     S/P lumbar fusion 4/5/2018     S/P 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 [facility] F's valve     APPLY WOUND VAC Left 1/26/2021    Procedure: Placement of negative pressure wound therapy 2,400 squared centimeters  ;  Surgeon: Lazaro Plascencia MD;  Location: [facility] OR     CATARACT IOL, RT/LT      rt eye only     CHOLECYSTECTOMY, LAPOROSCOPIC  8/10     CLOSE SECONDARY WOUND LOWER EXTREMITY Left 2/3/2021    Procedure:  Split Thickness Skin Graft to Leg of 18 square centimeters  Intermediate Closure of Leg of 8cm   ;  Surgeon: Lazaro Plascencia MD;  Location: [facility] OR     COLONOSCOPY N/A 1/15/2015    Procedure: COLONOSCOPY;  Surgeon: Johnson Kothari MD;  Location: [facility]     DECOMPRESSION LUMBAR ONE LEVEL  4/3/2013    Procedure: DECOMPRESSION LUMBAR ONE LEVEL;  Open Decompression L3-4 bilateral;  Surgeon: Travis Coffey MD;  Location: [facility]     DECOMPRESSION, FUSION CERVICAL ANTERIOR ONE LEVEL, COMBINED  3/23/2012    Procedure:COMBINED DECOMPRESSION, FUSION CERVICAL ANTERIOR ONE LEVEL; Anterior Cervical Decompression and Fusion C4-6; Surgeon:TRAVIS COFFEY; Location:[facility] OR     ELBOW SURGERY       EXPLORE SPINE, REMOVE HARDWARE, COMBINED  5/23/2013    Procedure: COMBINED EXPLORE SPINE, REMOVE HARDWARE;  Exploration Lumbar Wound for fluid collection;  Surgeon: Travis Coffey MD;  Location: [facility]     FUSION CERVICAL ANTERIOR TWO LEVELS  3/26/2012    Procedure:FUSION CERVICAL ANTERIOR TWO LEVELS; Anterior Cervical Fusion C4-6, Anterior Cervical  Hematoma Evacuation; Surgeon:TRAVIS COFFEY; Location:[facility] OR     IR LUMBAR EPIDURAL STEROID INJECTION  3/28/2003     IRRIGATION AND DEBRIDEMENT LOWER EXTREMITY, COMBINED Left 1/10/2021    Procedure:  1.  Irrigation and excisional debridement to muscle left distal medial calf chronic wounds total area measuring 9 cm x 4 cm 2.  Irrigation and excisional debridement to fascia left medial calf chronic hematoma measuring 29 x 6.7 x 4.2 cm 3.  Primary complex wound closure left medial distal calf 9 cm in length;  Surgeon: Rod Kemp MD;  Location: [facility]     IRRIGATION AND DEBRIDEMENT LOWER EXTREMITY, COMBINED Left 2021    Procedure: Evacuation of hematoma debridement of skin, subcutaneous tissue and muscle 2,400 squared centimeters, placement of negative pressure wound therapy 2,400 squared centimeters;  Surgeon: Lazaro Plascencia MD;  Location: [facility]     IRRIGATION AND DEBRIDEMENT SPINE, CLOSE WOUND, COMBINED  3/26/2012    Procedure:COMBINED IRRIGATION AND DEBRIDEMENT SPINE, CLOSE WOUND; Surgeon:TRAVIS COFFEY; Location:[facility]     OTHER SURGICAL HISTORY      [place] UNLISTED ORBIT     OTHER SURGICAL HISTORY      [place] UNLISTED SPINE     OTHER SURGICAL HISTORY      [facility] UNLISTED NECK/THORAX     OTHER SURGICAL HISTORY      (IA) [facility] TOTAL HIP ARTHROPLASTY     OTHER SURGICAL HISTORY      (IA) [facility] NEE SCOPE MED W LAT MENISCECT WWO DEBRIBE/SHAVE ANY CO     removal of cyst of back   2.5week     TONSILLECTOMY       wisdom teeth[       [facility] NONSPECIFIC PROCEDURE      repair of TAA with graft     [facility] TOTAL HIP ARTHROPLASTY      Left AMANDA     [facility] TOTAL HIP ARTHROPLASTY      R hip replacement comp's by nerve injury with pain down into R leg, nadya below knee       Family History   Problem Relation Age of Onset     Cerebrovascular Disease Father          age 86, had M.I. ,diabetic also     Prostate Cancer Father      Diabetes Father      Cancer Mother          age 83 of dementia, also h/o lymphoma     Diabetes Mother      Hypertension Brother         2 brothers with hypertension & sleep apnea     Hypertension Sister         Born ~1936     Sleep Apnea Brother          age 78, Alzheimers     No Known Problems  Son      No Known Problems Daughter      No Known Problems Son      Family History Negative Brother         Had 5 brothers, three still alive as of 2019     Colon Cancer No family hx of        Social History     Socioeconomic History     Marital status:      Spouse name: Not on file     Number of children: 3     Years of education: Not on file     Highest education level: High school graduate   Occupational History     Employer: DISABLED     Comment: Previous , disabled since ~2013   Tobacco Use     Smoking status: Never Smoker     Smokeless tobacco: Never Used   Vaping Use     Vaping Use: Never used   Substance and Sexual Activity     Alcohol use: No     Comment: Stopped drinking alcohol ~2009     Drug use: No     Sexual activity: Not Currently   Other Topics Concern     Parent/sibling w/ CABG, MI or angioplasty before 65F 55M? 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  (last updated 6/18/2021)      Social Determinants of Health     Financial Resource Strain: Low Risk      Difficulty of Paying Living Expenses: Not hard at all   Food Insecurity: No Food Insecurity     Worried About Running Out of Food in the Last Year: Never true     Ran Out of Food in the Last Year: Never true   Transportation Needs: No Transportation Needs     Lack of Transportation (Medical): No     Lack of Transportation (Non-Medical): No   Physical Activity: Inactive     Days of Exercise per Week: 0 days     Minutes of Exercise per Session: 30 min   Stress: No Stress Concern Present     Feeling of Stress : Not at all   Social Connections: Unknown     Frequency of Communication with Friends and Family: More than three times a week     Frequency of Social Gatherings with Friends and Family: Patient refused     Attends Zoroastrianism Services: Patient refused     Active Member of Clubs or Organizations: No     Attends Club or Organization Meetings: Not  on file     Marital Status:    Intimate Partner Violence: Not on file   Housing Stability: Low Risk      Unable to Pay for Housing in the Last Year: No     Number of Places Lived in the Last Year: 1     Unstable Housing in the Last Year: No       Current Outpatient Medications   Medication Sig Dispense Refill     acetaminophen (TYLENOL) 500 MG tablet Take 500-1,000 mg by mouth 2 times daily        aspirin (ASA) 81 MG chewable tablet Take 81 mg by mouth daily        cetirizine (ZYRTEC) 10 MG tablet TAKE ONE TABLET BY MOUTH ONCE DAILY AS NEEDED 90 tablet 3     diazepam (VALIUM) 5 MG tablet Take 1 tablet (5 mg) by mouth nightly as needed for anxiety 30 tablet 0     docusate sodium (COLACE) 100 MG capsule Take 1 capsule (100 mg) by mouth 3 times daily as needed for constipation 20 capsule 0     donepezil (ARICEPT) 10 MG tablet Take 1 tablet (10 mg) by mouth At Bedtime 90 tablet 0     folic acid (FOLVITE) 1 MG tablet Take 5 tablets  by mouth daily 450 tablet 3     guanFACINE (TENEX) 1 MG tablet Take 1 tablet (1 mg) by mouth At Bedtime 90 tablet 0     levothyroxine (SYNTHROID/LEVOTHROID) 150 MCG tablet Take 1 tablet (150 mcg) by mouth daily 90 tablet 3     Lidocaine (LIDOCARE) 4 % Patch Place 1 patch onto the skin every 24 hours To prevent lidocaine toxicity, patient should be patch free for 12 hrs daily.       methocarbamol (ROBAXIN) 500 MG tablet Take 1 tablet (500 mg) by mouth 4 times daily 20 tablet 0     MYRBETRIQ 50 MG 24 hr tablet TAKE ONE TABLET BY MOUTH ONE TIME DAILY 90 tablet 3     oxyCODONE (ROXICODONE) 5 MG tablet Take 1 tablet (5 mg) by mouth every 6 hours as needed for moderate to severe pain 12 tablet 0     pregabalin (LYRICA) 100 MG capsule Take 1 capsule (100 mg) by mouth 3 times daily Do not take if sleepy/sedated. 270 capsule 3     propafenone (RYTHMOL) 150 MG TABS tablet Take 1 tablet (150 mg) by mouth every 8 hours 270 tablet 1     senna-docusate (SENOKOT-S/PERICOLACE) 8.6-50 MG tablet Take 1  "tablet by mouth 2 times daily as needed for constipation       simvastatin (ZOCOR) 40 MG tablet Take 1 tablet (40 mg) by mouth At Bedtime. Need to update cholesterol level before additional refills. 90 tablet 3     venlafaxine (EFFEXOR-XR) 75 MG 24 hr capsule Take 3 capsules (225 mg) by mouth daily 270 capsule 0     warfarin ANTICOAGULANT (COUMADIN) 2.5 MG tablet Take 5 mg by mouth daily 7.5mg=Wed and Sun, 5mg=ROW       warfarin ANTICOAGULANT (COUMADIN) 5 MG tablet Take one tablet (5 mg) by mouth daily or as directed by INR Clinic. 90 tablet 0       Allergies   Allergen Reactions     Blood Transfusion Related (Informational Only) Other (See Comments)     Patient has a history of a clinically significant antibody against RBC antigens.  A delay in compatible RBCs may occur.       Gabapentin      Severe behavioral disturbances       REVIEW OF SYSTEMS:  CONSTITUTIONAL:  NEGATIVE for fever, chills, change in weight  INTEGUMENTARY/SKIN:  NEGATIVE for worrisome rashes, moles or lesions  EYES:  NEGATIVE for vision changes or irritation  ENT/MOUTH:  NEGATIVE for ear, mouth and throat problems  RESP:  NEGATIVE for significant cough or SOB  BREAST:  NEGATIVE for masses, tenderness or discharge  CV:  Irregular heartbeats   GI:  NEGATIVE for nausea, abdominal pain, heartburn, or change in bowel habits  :  Negative   MUSCULOSKELETAL:  See HPI above  NEURO:  NEGATIVE for weakness, dizziness or paresthesias  ENDOCRINE:  NEGATIVE for temperature intolerance, skin/hair changes  HEME/ALLERGY/IMMUNE:  Anticoagulated   PSYCHIATRIC:  Depression      PHYSICAL EXAM:  /70 (BP Location: Right arm, Patient Position: Chair)   Ht 1.88 m (6' 2\")   Wt 137 kg (302 lb)   BMI 38.77 kg/m    Body mass index is 38.77 kg/m .   GENERAL APPEARANCE: healthy, alert and no distress   HEENT: No apparent thyroid megaly. Clear sclera with normal ocular movement  RESPIRATORY: No labored breathing  SKIN: no suspicious lesions or rashes  NEURO: Normal " strength and tone, mentation intact and speech normal  VASCULAR: Good pulses, and capillary refill   LYMPH: no lymphadenopathy   PSYCH:  mentation appears normal and affect normal/bright    MUSCULOSKELETAL:  Not in acute distress  Normal neck movement    Both shoulders with intact passive and active range of motion  Right shoulder range of motion is not associated pain  However he does have distinct weakness of abduction and external rotation  He has mild positive arm drop test on the right  Negative belly press test  Negative impingement sign    On the left shoulder, with a circumduction no specific crepitus is noted  Negative arm drop test  Positive impingement sign  Positive painful arc of motion  Full isometric strength in all directions  Elbow range of motion is full     ASSESSMENT:  Chronic right shoulder weakness without significant pain suggesting asymptomatic rotator cuff tear situation    Left shoulder pain with DJD and rotator cuff tendinosis  Prior history of distal clavicle fracture, posttraumatic    Chronic obesity    PLAN:    Previous x-rays of December 2020 and September 2021 were visualized.  The most recent x-rays were done at [facility] which were pushed to the [facility] system.  Even though the views are not exactly the same, there appears to be some deterioration of shoulder DJD from December 2000 22 September 2021.    Based on today's examination, however, it appears that his main problem is rotator cuff tendinosis/tendinitis rather than DJD although exact contribution to the entire pain from each pathology is impossible to discern.    For that reason, at this point, partly diagnostic and partly therapeutic, a cord injection initially to the subacromial space is recommended.  Depending on his response, we may come back and consider intra-articular injection.      Based on today's physical examination findings for the right shoulder, were concerned about chronic rotator cuff tear  situation.  Given his young age, I recommend MRI scan evaluation to evaluate the rotator cuff status.    What to expect from the cord injection was informed.  We will see him back in 1 month for reevaluation of the left shoulder as well as to review and to discuss treatment options for the right shoulder.          Imaging Interpretation:       Large Joint Injection/Arthocentesis: L subacromial bursa    Date/Time: 4/11/2022 2:01 PM  Performed by: Scar Dejesus MD  Authorized by: Scar Dejesus MD     Indications:  Pain and osteoarthritis  Needle Size:  22 G  Guidance: landmark guided    Approach:  Posterior  Location:  Shoulder      Site:  L subacromial bursa  Medications:  2 mL dexamethasone 120 MG/30ML; 4 mL lidocaine 1 %  Medications comment:  8mL of 1% Lidocaine was injected.  Procedure discussed: discussed risks, benefits, and alternatives    Consent Given by:  Patient  Timeout: timeout called immediately prior to procedure    Prep: patient was prepped and draped in usual sterile fashion          Scar Dejesus MD  Department of Orthopedic Surgery        Disclaimer: This note consists of symbols derived from keyboarding, dictation and/or voice recognition software. As a result, there may be errors in the script that have gone undetected. Please consider this when interpreting information found in this chart.

## 2022-04-11 NOTE — LETTER
4/11/2022         RE: Todd S Aschoff  [address]  [place] [address]        Dear Colleague,    Thank you for referring your patient, Todd S Aschoff, to the [facility] [place]. Please see a copy of my visit note below.    HISTORY OF PRESENT ILLNESS:    Todd S Aschoff is a 66 year old male who is seen as self referral for left shoulder pain. Onset of symptoms 12/2021. He is right hand dominant, and retired.     Present symptoms: left shoulder pain located deep within the joint. He describes pain as dull, aching, sharp and searing.  Weakness of the left shoulder, he notes that due to back pain he is also limited in activities.  Increased pain with lifting, pushing, pulling. Increased pain in the the morning. Pain is improved by Tylenol TID.   Current pain level: 5/10, Worst pain level: 10/10     Treatments tried to this point: heat, and ice Tylenol TID,  Lyrica, diazepam at nighttime   Orthopedic PMH: bilateral AMANDA,  Lumbar fusion, Cervical fusion, Right rotator cuff tear    Past Medical History:   Diagnosis Date     Advanced directives, counseling/discussion 1/6/2012    Discussed Advance Directive planning with patient; information given to patient to review.     Alcohol dependence (H)      Alcohol dependence in remission (H) 8/2/2018     Allergy, unspecified not elsewhere classified     seasonal     Anemia, unspecified type 1/22/2021     Aortic valve prosthesis present 2/8/2021     Atrial fibrillation (H)      Benign prostatic hyperplasia 2/8/2021     Bilateral thoracic back pain 11/16/2016     BPH (benign prostatic hypertrophy)      Chronic atrial fibrillation (H) 8/7/2002     Chronic infection     low back wound incision not healing      Chronic low back pain 8/19/2011     Chronic pain     lower back and right leg and left leg     Depressive disorder 6/9/2010    Depression  NOS     Dissection of aorta, thoracic (H) 1992    St Jose F aotic valve + arch graft 1992     Elevated  prostate specific antigen (PSA) 1/22/2020     Family history of prostate cancer 1/22/2020     Generalized muscle weakness 1/22/2021     Headache(784.0)      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 Provider to review     Leg wound, left 1/9/2021     Long term current use of anticoagulant therapy 8/7/2002    LW Modifier:  Coumadin, since mechanical aortic valve LW Onset:  1992 ; Anticoagulant Rx Long Term Problem list name updated by automated process. Provider to review     Low back pain      Lumbar radiculopathy 4/3/2013     Lumbar surgical wound fluid collection 5/23/2013     Major depression      Memory difficulties 1/16/2015     Mixed hyperlipidemia      Morbid obesity (H) 4/20/2010    LW Modifier:  BMI 41.3 (Apr 2010) ; Obesity Morbid     Numbness and tingling     right leg post surg rightt hip/ also left leg since surg     OA (osteoarthritis)     hips     OAB (overactive bladder) 5/11/2015     Obesity, unspecified      Persons encountering health services in other specified circumstances 4/3/2012    Formatting of this note might be different from the original. EMERGENCY CARE PLAN Presenting Problem Signs and Symptoms Treatment Plan   Questions or conerns during clinic hours    I will call the clinic directly    Questions or conerns outside clinic hours    I will call the 24 hour nurse line at [phone]   Patient needs to schedule an appointment    I will call the 24 hour scheduling team at     Polypharmacy 1/22/2021     Prostate infection      Radiculitis 4/20/2010    LW Modifier:  Right foot, s/p R AAMNDA LW Onset:  2002 ; Neuralgia     Recurrent major depressive episodes, in full remission (H) 10/30/2012     Renal insufficiency 1/22/2021     Rotator  cuff tear 1/26/2015    Rotator cuff tear, right     S/P lumbar fusion 4/5/2018     S/P 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 Jose F's valve     APPLY WOUND VAC Left 1/26/2021    Procedure: Placement of negative pressure wound therapy 2,400 squared centimeters  ;  Surgeon: Lazaro Plascencia MD;  Location: [facility] OR     CATARACT IOL, RT/LT      rt eye only     CHOLECYSTECTOMY, LAPOROSCOPIC  8/10     CLOSE SECONDARY WOUND LOWER EXTREMITY Left 2/3/2021    Procedure:  Split Thickness Skin Graft to Leg of 18 square centimeters  Intermediate Closure of Leg of 8cm   ;  Surgeon: Lazaro Plascencia MD;  Location: [facility] OR     COLONOSCOPY N/A 1/15/2015    Procedure: COLONOSCOPY;  Surgeon: Johnson Kothari MD;  Location:  GI     DECOMPRESSION LUMBAR ONE LEVEL  4/3/2013    Procedure: DECOMPRESSION LUMBAR ONE LEVEL;  Open Decompression L3-4 bilateral;  Surgeon: Travis Coffey MD;  Location: [facility] OR     DECOMPRESSION, FUSION CERVICAL ANTERIOR ONE LEVEL, COMBINED  3/23/2012    Procedure:COMBINED DECOMPRESSION, FUSION CERVICAL ANTERIOR ONE LEVEL; Anterior Cervical Decompression and Fusion C4-6; Surgeon:TRAVIS COFFEY; Location:[facility] OR     ELBOW SURGERY       EXPLORE SPINE, REMOVE HARDWARE, COMBINED  5/23/2013    Procedure: COMBINED EXPLORE SPINE, REMOVE HARDWARE;  Exploration Lumbar Wound for fluid collection;  Surgeon: Travis Coffey MD;  Location: [facility] OR     FUSION CERVICAL ANTERIOR TWO LEVELS  3/26/2012    Procedure:FUSION CERVICAL ANTERIOR TWO LEVELS;  Anterior Cervical Fusion C4-6, Anterior Cervical  Hematoma Evacuation; Surgeon:TRAVIS COFFEY; Location:[facility] OR     IR LUMBAR EPIDURAL STEROID INJECTION  3/28/2003     IRRIGATION AND DEBRIDEMENT LOWER EXTREMITY, COMBINED Left 1/10/2021    Procedure: 1.  Irrigation and excisional debridement to muscle left distal medial calf chronic wounds total area measuring 9 cm x 4 cm 2.  Irrigation and excisional debridement to fascia left medial calf chronic hematoma measuring 29 x 6.7 x 4.2 cm 3.  Primary complex wound closure left medial distal calf 9 cm in length;  Surgeon: Rod Kemp MD;  Location: [facility] OR     IRRIGATION AND DEBRIDEMENT LOWER EXTREMITY, COMBINED Left 2021    Procedure: Evacuation of hematoma debridement of skin, subcutaneous tissue and muscle 2,400 squared centimeters, placement of negative pressure wound therapy 2,400 squared centimeters;  Surgeon: Lazaro Plascencia MD;  Location: [facility] OR     IRRIGATION AND DEBRIDEMENT SPINE, CLOSE WOUND, COMBINED  3/26/2012    Procedure:COMBINED IRRIGATION AND DEBRIDEMENT SPINE, CLOSE WOUND; Surgeon:TRAVIS COFFEY; Location:[facility] OR     OTHER SURGICAL HISTORY      SC UNLISTED ORBIT     OTHER SURGICAL HISTORY      SC UNLISTED SPINE     OTHER SURGICAL HISTORY      SC UNLISTED NECK/THORAX     OTHER SURGICAL HISTORY      (IA) SC TOTAL HIP ARTHROPLASTY     OTHER SURGICAL HISTORY      (IA) SC NEE SCOPE MED W LAT MENISCECT WWO DEBRIBE/SHAVE ANY CO     removal of cyst of back   2.5week     TONSILLECTOMY       wisdom teeth[       [facility] NONSPECIFIC PROCEDURE  1992    repair of TAA with graft     [facility] TOTAL HIP ARTHROPLASTY      Left AMANDA     [facility] TOTAL HIP ARTHROPLASTY      R hip replacement comp's by nerve injury with pain down into R leg, nadya below knee       Family History   Problem Relation Age of Onset     Cerebrovascular Disease Father          age 86, had M.I. ,diabetic also     Prostate Cancer Father      Diabetes Father      Cancer Mother           age 83 of dementia, also h/o lymphoma     Diabetes Mother      Hypertension Brother         2 brothers with hypertension & sleep apnea     Hypertension Sister         Born ~1936     Sleep Apnea Brother          age 78, Alzheimers     No Known Problems Son      No Known Problems Daughter      No Known Problems Son      Family History Negative Brother         Had 5 brothers, three still alive as of 2019     Colon Cancer No family hx of        Social History     Socioeconomic History     Marital status:      Spouse name: Not on file     Number of children: 3     Years of education: Not on file     Highest education level: High school graduate   Occupational History     Employer: DISABLED     Comment: Previous , disabled since ~   Tobacco Use     Smoking status: Never Smoker     Smokeless tobacco: Never Used   Vaping Use     Vaping Use: Never used   Substance and Sexual Activity     Alcohol use: No     Comment: Stopped drinking alcohol ~     Drug use: No     Sexual activity: Not Currently   Other Topics Concern     Parent/sibling w/ CABG, MI or angioplasty before 65F 55M? 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  (last updated 2021)      Social Determinants of Health     Financial Resource Strain: Low Risk      Difficulty of Paying Living Expenses: Not hard at all   Food Insecurity: No Food Insecurity     Worried About Running Out of Food in the Last Year: Never true     Ran Out of Food in the Last Year: Never true   Transportation Needs: No Transportation Needs     Lack of Transportation (Medical): No     Lack of Transportation (Non-Medical): No   Physical Activity: Inactive     Days of Exercise per Week: 0 days     Minutes of Exercise per Session: 30 min   Stress: No Stress Concern Present     Feeling of Stress : Not at all   Social Connections: Unknown     Frequency of Communication with Friends and  Family: More than three times a week     Frequency of Social Gatherings with Friends and Family: Patient refused     Attends Restoration Services: Patient refused     Active Member of Clubs or Organizations: No     Attends Club or Organization Meetings: Not on file     Marital Status:    Intimate Partner Violence: Not on file   Housing Stability: Low Risk      Unable to Pay for Housing in the Last Year: No     Number of Places Lived in the Last Year: 1     Unstable Housing in the Last Year: No       Current Outpatient Medications   Medication Sig Dispense Refill     acetaminophen (TYLENOL) 500 MG tablet Take 500-1,000 mg by mouth 2 times daily        aspirin (ASA) 81 MG chewable tablet Take 81 mg by mouth daily        cetirizine (ZYRTEC) 10 MG tablet TAKE ONE TABLET BY MOUTH ONCE DAILY AS NEEDED 90 tablet 3     diazepam (VALIUM) 5 MG tablet Take 1 tablet (5 mg) by mouth nightly as needed for anxiety 30 tablet 0     docusate sodium (COLACE) 100 MG capsule Take 1 capsule (100 mg) by mouth 3 times daily as needed for constipation 20 capsule 0     donepezil (ARICEPT) 10 MG tablet Take 1 tablet (10 mg) by mouth At Bedtime 90 tablet 0     folic acid (FOLVITE) 1 MG tablet Take 5 tablets  by mouth daily 450 tablet 3     guanFACINE (TENEX) 1 MG tablet Take 1 tablet (1 mg) by mouth At Bedtime 90 tablet 0     levothyroxine (SYNTHROID/LEVOTHROID) 150 MCG tablet Take 1 tablet (150 mcg) by mouth daily 90 tablet 3     Lidocaine (LIDOCARE) 4 % Patch Place 1 patch onto the skin every 24 hours To prevent lidocaine toxicity, patient should be patch free for 12 hrs daily.       methocarbamol (ROBAXIN) 500 MG tablet Take 1 tablet (500 mg) by mouth 4 times daily 20 tablet 0     MYRBETRIQ 50 MG 24 hr tablet TAKE ONE TABLET BY MOUTH ONE TIME DAILY 90 tablet 3     oxyCODONE (ROXICODONE) 5 MG tablet Take 1 tablet (5 mg) by mouth every 6 hours as needed for moderate to severe pain 12 tablet 0     pregabalin (LYRICA) 100 MG capsule Take 1  "capsule (100 mg) by mouth 3 times daily Do not take if sleepy/sedated. 270 capsule 3     propafenone (RYTHMOL) 150 MG TABS tablet Take 1 tablet (150 mg) by mouth every 8 hours 270 tablet 1     senna-docusate (SENOKOT-S/PERICOLACE) 8.6-50 MG tablet Take 1 tablet by mouth 2 times daily as needed for constipation       simvastatin (ZOCOR) 40 MG tablet Take 1 tablet (40 mg) by mouth At Bedtime. Need to update cholesterol level before additional refills. 90 tablet 3     venlafaxine (EFFEXOR-XR) 75 MG 24 hr capsule Take 3 capsules (225 mg) by mouth daily 270 capsule 0     warfarin ANTICOAGULANT (COUMADIN) 2.5 MG tablet Take 5 mg by mouth daily 7.5mg=Wed and Sun, 5mg=ROW       warfarin ANTICOAGULANT (COUMADIN) 5 MG tablet Take one tablet (5 mg) by mouth daily or as directed by INR Clinic. 90 tablet 0       Allergies   Allergen Reactions     Blood Transfusion Related (Informational Only) Other (See Comments)     Patient has a history of a clinically significant antibody against RBC antigens.  A delay in compatible RBCs may occur.       Gabapentin      Severe behavioral disturbances       REVIEW OF SYSTEMS:  CONSTITUTIONAL:  NEGATIVE for fever, chills, change in weight  INTEGUMENTARY/SKIN:  NEGATIVE for worrisome rashes, moles or lesions  EYES:  NEGATIVE for vision changes or irritation  ENT/MOUTH:  NEGATIVE for ear, mouth and throat problems  RESP:  NEGATIVE for significant cough or SOB  BREAST:  NEGATIVE for masses, tenderness or discharge  CV:  Irregular heartbeats   GI:  NEGATIVE for nausea, abdominal pain, heartburn, or change in bowel habits  :  Negative   MUSCULOSKELETAL:  See HPI above  NEURO:  NEGATIVE for weakness, dizziness or paresthesias  ENDOCRINE:  NEGATIVE for temperature intolerance, skin/hair changes  HEME/ALLERGY/IMMUNE:  Anticoagulated   PSYCHIATRIC:  Depression      PHYSICAL EXAM:  /70 (BP Location: Right arm, Patient Position: Chair)   Ht 1.88 m (6' 2\")   Wt 137 kg (302 lb)   BMI 38.77 kg/m  "   Body mass index is 38.77 kg/m .   GENERAL APPEARANCE: healthy, alert and no distress   HEENT: No apparent thyroid megaly. Clear sclera with normal ocular movement  RESPIRATORY: No labored breathing  SKIN: no suspicious lesions or rashes  NEURO: Normal strength and tone, mentation intact and speech normal  VASCULAR: Good pulses, and capillary refill   LYMPH: no lymphadenopathy   PSYCH:  mentation appears normal and affect normal/bright    MUSCULOSKELETAL:  Not in acute distress  Normal neck movement    Both shoulders with intact passive and active range of motion  Right shoulder range of motion is not associated pain  However he does have distinct weakness of abduction and external rotation  He has mild positive arm drop test on the right  Negative belly press test  Negative impingement sign    On the left shoulder, with a circumduction no specific crepitus is noted  Negative arm drop test  Positive impingement sign  Positive painful arc of motion  Full isometric strength in all directions  Elbow range of motion is full     ASSESSMENT:  Chronic right shoulder weakness without significant pain suggesting asymptomatic rotator cuff tear situation    Left shoulder pain with DJD and rotator cuff tendinosis  Prior history of distal clavicle fracture, posttraumatic    Chronic obesity    PLAN:    Previous x-rays of December 2020 and September 2021 were visualized.  The most recent x-rays were done at [facility] which were pushed to the [employer] system.  Even though the views are not exactly the same, there appears to be some deterioration of shoulder DJD from December 2000 22 September 2021.    Based on today's examination, however, it appears that his main problem is rotator cuff tendinosis/tendinitis rather than DJD although exact contribution to the entire pain from each pathology is impossible to discern.    For that reason, at this point, partly diagnostic and partly therapeutic, a cord injection initially to  the subacromial space is recommended.  Depending on his response, we may come back and consider intra-articular injection.      Based on today's physical examination findings for the right shoulder, were concerned about chronic rotator cuff tear situation.  Given his young age, I recommend MRI scan evaluation to evaluate the rotator cuff status.    What to expect from the cord injection was informed.  We will see him back in 1 month for reevaluation of the left shoulder as well as to review and to discuss treatment options for the right shoulder.          Imaging Interpretation:       Large Joint Injection/Arthocentesis: L subacromial bursa    Date/Time: 4/11/2022 2:01 PM  Performed by: Scar Dejesus MD  Authorized by: Scar Dejesus MD     Indications:  Pain and osteoarthritis  Needle Size:  22 G  Guidance: landmark guided    Approach:  Posterior  Location:  Shoulder      Site:  L subacromial bursa  Medications:  2 mL dexamethasone 120 MG/30ML; 4 mL lidocaine 1 %  Medications comment:  8mL of 1% Lidocaine was injected.  Procedure discussed: discussed risks, benefits, and alternatives    Consent Given by:  Patient  Timeout: timeout called immediately prior to procedure    Prep: patient was prepped and draped in usual sterile fashion          Scar Dejesus MD  Department of Orthopedic Surgery        Disclaimer: This note consists of symbols derived from keyboarding, dictation and/or voice recognition software. As a result, there may be errors in the script that have gone undetected. Please consider this when interpreting information found in this chart.        Again, thank you for allowing me to participate in the care of your patient.        Sincerely,        Scar Dejesus MD

## 2022-04-14 ENCOUNTER — LAB (OUTPATIENT)
Dept: LAB | Facility: CLINIC | Age: 67
End: 2022-04-14
Payer: COMMERCIAL

## 2022-04-14 ENCOUNTER — ANTICOAGULATION THERAPY VISIT (OUTPATIENT)
Dept: ANTICOAGULATION | Facility: CLINIC | Age: 67
End: 2022-04-14

## 2022-04-14 DIAGNOSIS — Z95.2 AORTIC VALVE PROSTHESIS PRESENT: ICD-10-CM

## 2022-04-14 DIAGNOSIS — I48.0 PAF (PAROXYSMAL ATRIAL FIBRILLATION) (H): Primary | ICD-10-CM

## 2022-04-14 LAB — INR BLD: 2.3 (ref 0.9–1.1)

## 2022-04-14 PROCEDURE — 85610 PROTHROMBIN TIME: CPT

## 2022-04-14 PROCEDURE — 36416 COLLJ CAPILLARY BLOOD SPEC: CPT

## 2022-04-14 NOTE — PROGRESS NOTES
ANTICOAGULATION MANAGEMENT     Todd S Aschoff 66 year old male is on warfarin with subtherapeutic INR result. (Goal INR 2.5-3.5)    Recent labs: (last 7 days)     04/14/22  1111   INR 2.3*       ASSESSMENT       Source(s): Chart Review and Patient/Caregiver Call       Warfarin doses taken: Less warfarin taken than planned which may be affecting INR.  Did not increase his dose as instructed at last visit.  Will try maintenance dose as suggested at last visit (4% increase)    Diet: No new diet changes identified    New illness, injury, or hospitalization: No    Medication/supplement changes: None noted    Signs or symptoms of bleeding or clotting: No    Previous INR: Therapeutic last visit; previously outside of goal range    Additional findings: None       PLAN     Recommended plan for no diet, medication or health factor changes affecting INR     Dosing Instructions: continue your current warfarin dose with next INR in 10 days       Summary  As of 4/14/2022    Full warfarin instructions:  3.75 mg every Tue, Sat; 5 mg all other days   Next INR check:  4/25/2022             Telephone call with Nicko who agrees to plan and repeated back plan correctly    Lab visit scheduled    Education provided: Please call back if any changes to your diet, medications or how you've been taking warfarin    Plan made per [facility] anticoagulation protocol    Amanda Wheeler RN  Anticoagulation Clinic  4/14/2022    _______________________________________________________________________     Anticoagulation Episode Summary     Current INR goal:  2.5-3.5   TTR:  58.2 % (11.8 mo)   Target end date:  Indefinite   Send INR reminders to:  AIDA GUERRERO    Indications    PAF (paroxysmal atrial fibrillation) (H) [I48.0]  Long term current use of anticoagulant therapy (Resolved) [Z79.01]  Mechanical AV Replacement 1992 -- on Warfarin  [Z95.2]           Comments:           Anticoagulation Care Providers     Provider Role Specialty Phone number    Nataly  Obdulio DASH MD [facility] Internal Medicine [phone]

## 2022-04-22 ENCOUNTER — HOSPITAL ENCOUNTER (OUTPATIENT)
Dept: WOUND CARE | Facility: CLINIC | Age: 67
Discharge: HOME | End: 2022-04-22
Attending: PHYSICIAN ASSISTANT | Admitting: PHYSICIAN ASSISTANT
Payer: COMMERCIAL

## 2022-04-22 VITALS
BODY MASS INDEX: 39.27 KG/M2 | HEIGHT: 74 IN | DIASTOLIC BLOOD PRESSURE: 108 MMHG | HEART RATE: 100 BPM | TEMPERATURE: 96.8 F | WEIGHT: 306 LBS | SYSTOLIC BLOOD PRESSURE: 176 MMHG

## 2022-04-22 DIAGNOSIS — D48.5 NEOPLASM OF UNCERTAIN BEHAVIOR OF SKIN: ICD-10-CM

## 2022-04-22 DIAGNOSIS — T81.31XA POSTOPERATIVE WOUND DEHISCENCE, INITIAL ENCOUNTER: ICD-10-CM

## 2022-04-22 PROCEDURE — 99204 OFFICE O/P NEW MOD 45 MIN: CPT | Performed by: PHYSICIAN ASSISTANT

## 2022-04-22 PROCEDURE — G0463 HOSPITAL OUTPT CLINIC VISIT: HCPCS | Mod: 25

## 2022-04-22 RX ORDER — CYCLOBENZAPRINE HCL 10 MG
TABLET ORAL
COMMUNITY
Start: 2022-04-19 | End: 2023-01-30

## 2022-04-22 RX ORDER — LIOTHYRONINE SODIUM 25 UG/1
25 TABLET ORAL DAILY
COMMUNITY
Start: 2022-03-10 | End: 2022-09-02

## 2022-04-22 RX ORDER — LAMOTRIGINE 25 MG/1
75 TABLET ORAL
COMMUNITY
Start: 2021-10-20 | End: 2022-12-12

## 2022-04-22 RX ORDER — CIPROFLOXACIN 500 MG/1
TABLET, FILM COATED ORAL
COMMUNITY
Start: 2021-12-08 | End: 2022-09-02

## 2022-04-22 RX ORDER — OXYCODONE AND ACETAMINOPHEN 5; 325 MG/1; MG/1
TABLET ORAL
COMMUNITY
Start: 2021-09-30 | End: 2022-09-02

## 2022-04-22 NOTE — DISCHARGE INSTRUCTIONS
Todd S Aschoff      1955    Wound Dressing Change: Back wounds x 2  -Cleanse wound and surrounding skin with shower with gentle soap and water, rinse well, pat dry  -Cover wound with small dab of Iodosorb gel on Band aid and cover open are  -Change dressing Daily starting tomorrow     Rhianna Golden PA-C April 22, 2022    Call us at [phone] if you have any questions about your wounds, have redness or swelling around your wound, have a fever of 101 or greater or if you have any other problems or concerns. We answer the phone Monday through Friday 8 am to 4 pm, please leave a message as we check the voicemail frequently throughout the day.     If you had a positive experience please indicate on your patient satisfaction survey form that  "[employer] [place] will be sending you.    If you have any billing related questions please call the  "[employer] Business office at [phone]. The clinic staff does not handle billing related matters.

## 2022-04-22 NOTE — PROGRESS NOTES
Patient arrived for wound care visit. Certified Wound Care Nurse time spent evaluating patient record, completed a full evaluation and documented wound(s) & alejandra-wound skin; provided recommendation based on treatment plan. Applied dressing, reviewed discharge instructions, patient education, and discussed plan of care with appropriate medical team staff members and patient and/or family members.     All concerns addressed

## 2022-04-22 NOTE — PROGRESS NOTES
"[facility]    ASSESSMENT:   1. Suture abscess    PLAN/DISCUSSION:   1. Sutures at base of nodules were snipped, the distal wound had a knot that was snipped, no knot was appreciated in the upper nodule but visible suture material was snipped out.   2. He was educated on signs of infection and instructed to call or seek medical attention with concerns  3. Wound care plan: starting tomorrow clean with soap and water and iodosorb and bandaid, change daily until healed  4. Nutrition: vit D 5kIU/day, high protein diet  5. See bottom of note for detailed wound care and patient instructions    HISTORY OF PRESENT ILLNESS:   Todd S Aschoff is a 66 year old male with history of spinal surgery in 2017 who presents today for draining and painful nodules within his old incision. He notes that his surgery was quite complicated and required revision. He was doing ok until the last 8 months when he appreciated the two nodules. He notes that some suture material had already expelled from the area.  He did have follow-up with spine surgery who ordered MRI. They had no concerns for deeper pathology and referred him to our clinic.     VITALS: BP (!) 176/108   Pulse 100   Temp 96.8  F (36  C) (Temporal)   Ht 1.867 m (6' 1.5\")   Wt 138.8 kg (306 lb)   BMI 39.82 kg/m       PHYSICAL EXAM:  GENERAL: Patient is alert and oriented and in no acute distress  INTEGUMENTARY: two nodules along spinal incision          PROCEDURE: The area was prepped with betadine. Patient was determined to be capable of making their own medical decisions and informed consent was obtained. 1cc of lido with epi was injected into each nodule. Using an 11 blade a small incision was made into the scar tissue. Blue suture material was visualized and was snipped out of base of wound. Hemostasis was achieved with pressure and silver nitrate. Wound was dressed with iodosorb and bandaid. The patient tolerated the procedure well.      MDM: 45-59 " minutes were spent on the date of the visit reviewing previous chart notes, evaluating patient and developing the treatment plan, this excludes any time spent on procedures.     PATIENT INSTRUCTIONS      Further instructions from your care team       Todd S Aschoff      1955    Wound Dressing Change: Back wounds x 2  -Cleanse wound and surrounding skin with shower with gentle soap and water, rinse well, pat dry  -Cover wound with small dab of Iodosorb gel on Band aid and cover open are  -Change dressing Daily starting tomorrow     Rhianna Golden PA-C April 22, 2022    Call us at [phone] if you have any questions about your wounds, have redness or swelling around your wound, have a fever of 101 or greater or if you have any other problems or concerns. We answer the phone Monday through Friday 8 am to 4 pm, please leave a message as we check the voicemail frequently throughout the day.     If you had a positive experience please indicate on your patient satisfaction survey form that [facility] will be sending you.    If you have any billing related questions please call the  [facility] office at [phone]. The clinic staff does not handle billing related matters.              Electronically signed by Rhianna Golden PA-C on April 22, 2022

## 2022-04-23 DIAGNOSIS — R41.3 MEMORY DIFFICULTIES: ICD-10-CM

## 2022-04-25 ENCOUNTER — ANTICOAGULATION THERAPY VISIT (OUTPATIENT)
Dept: ANTICOAGULATION | Facility: CLINIC | Age: 67
End: 2022-04-25

## 2022-04-25 ENCOUNTER — LAB (OUTPATIENT)
Dept: LAB | Facility: CLINIC | Age: 67
End: 2022-04-25
Payer: COMMERCIAL

## 2022-04-25 DIAGNOSIS — Z95.2 AORTIC VALVE PROSTHESIS PRESENT: ICD-10-CM

## 2022-04-25 DIAGNOSIS — I48.0 PAF (PAROXYSMAL ATRIAL FIBRILLATION) (H): Primary | ICD-10-CM

## 2022-04-25 LAB — INR BLD: 2 (ref 0.9–1.1)

## 2022-04-25 PROCEDURE — 85610 PROTHROMBIN TIME: CPT

## 2022-04-25 PROCEDURE — 36416 COLLJ CAPILLARY BLOOD SPEC: CPT

## 2022-04-25 RX ORDER — DONEPEZIL HYDROCHLORIDE 10 MG/1
10 TABLET, FILM COATED ORAL AT BEDTIME
Qty: 90 TABLET | Refills: 3 | Status: SHIPPED | OUTPATIENT
Start: 2022-04-25 | End: 2023-03-14

## 2022-04-25 NOTE — PROGRESS NOTES
ANTICOAGULATION MANAGEMENT     Todd S Aschoff 66 year old male is on warfarin with subtherapeutic INR result. (Goal INR 2.5-3.5)    Recent labs: (last 7 days)     04/25/22  1053   INR 2.0*       ASSESSMENT       Source(s): Chart Review    Previous INR was Subtherapeutic    Medication, diet, health changes since last INR chart reviewed; none identified     PLAN     Unable to reach Nicko today.    Left message to take a booster dose of warfarin,  7.5 mg tonight. Request call back for assessment.    Follow up required to discuss out of range result     Tristan Gamboa RN  Anticoagulation Clinic  4/25/2022

## 2022-04-25 NOTE — TELEPHONE ENCOUNTER
Routing refill request to provider for review/approval because:  Lizzeth given x1 and patient did not follow up, please advise     Routing to station to assist w/ scheduling: Return in about 3 months (around 3/7/2022) w/ Dr. Eddie JOHNSON RN

## 2022-04-26 NOTE — PROGRESS NOTES
ANTICOAGULATION MANAGEMENT     Todd S Aschoff 66 year old male is on warfarin with subtherapeutic INR result. (Goal INR 2.5-3.5)    Recent labs: (last 7 days)     04/25/22  1053   INR 2.0*       ASSESSMENT       Source(s): Chart Review and Patient/Caregiver Call       Warfarin doses taken: Warfarin taken as instructed.  Did not receive the voicemail last evening to take boost dose.  Will take boost dose today.    Diet: No new diet changes identified    New illness, injury, or hospitalization: No    Medication/supplement changes: None noted    Signs or symptoms of bleeding or clotting: No    Previous INR: Subtherapeutic    Additional findings: None       PLAN     Recommended plan for no diet, medication or health factor changes affecting INR     Dosing Instructions:Boost dose today, then Increase your warfarin dose (8% change) with next INR in 1 week       Summary  As of 4/25/2022    Full warfarin instructions:  4/26: 7.5 mg; Otherwise 5 mg every day   Next INR check:  5/4/2022             Telephone call with Nicko who agrees to plan and repeated back plan correctly    Lab visit scheduled    Education provided: Please call back if any changes to your diet, medications or how you've been taking warfarin    Plan made per [facility] anticoagulation protocol    Amanda Wheeler RN  Anticoagulation Clinic  4/26/2022    _______________________________________________________________________     Anticoagulation Episode Summary     Current INR goal:  2.5-3.5   TTR:  57.1 % (1 y)   Target end date:  Indefinite   Send INR reminders to:  AIDA GUERRERO    Indications    PAF (paroxysmal atrial fibrillation) (H) [I48.0]  Long term current use of anticoagulant therapy (Resolved) [Z79.01]  Mechanical AV Replacement 1992 -- on Warfarin  [Z95.2]           Comments:           Anticoagulation Care Providers     Provider Role Specialty Phone number    Obdulio Ingram MD Referring Internal Medicine [phone]

## 2022-05-04 ENCOUNTER — LAB (OUTPATIENT)
Dept: LAB | Facility: CLINIC | Age: 67
End: 2022-05-04
Payer: MEDICARE

## 2022-05-04 ENCOUNTER — ANTICOAGULATION THERAPY VISIT (OUTPATIENT)
Dept: ANTICOAGULATION | Facility: CLINIC | Age: 67
End: 2022-05-04

## 2022-05-04 DIAGNOSIS — I48.0 PAF (PAROXYSMAL ATRIAL FIBRILLATION) (H): Primary | ICD-10-CM

## 2022-05-04 DIAGNOSIS — Z95.2 AORTIC VALVE PROSTHESIS PRESENT: ICD-10-CM

## 2022-05-04 LAB — INR BLD: 2.9 (ref 0.9–1.1)

## 2022-05-04 PROCEDURE — 85610 PROTHROMBIN TIME: CPT

## 2022-05-04 PROCEDURE — 36416 COLLJ CAPILLARY BLOOD SPEC: CPT

## 2022-05-04 NOTE — PROGRESS NOTES
ANTICOAGULATION MANAGEMENT     Todd S Aschoff 66 year old male is on warfarin with therapeutic INR result. (Goal INR 2.5-3.5)    Recent labs: (last 7 days)     05/04/22  1044   INR 2.9*       ASSESSMENT       Source(s): Chart Review and Patient/Caregiver Call       Warfarin doses taken: More warfarin taken than planned which may be affecting INR. He took 7.5 mg on Friday instead of 5 mg because he ate a lot of green vegetables that day.    Diet: Increased greens/vitamin K in diet; plans to resume previous intake    New illness, injury, or hospitalization: No    Medication/supplement changes: None noted    Signs or symptoms of bleeding or clotting: No    Previous INR: Subtherapeutic    Additional findings: Dose was increased by 8% last INR       PLAN     Recommended plan for no diet, medication or health factor changes affecting INR     Dosing Instructions: continue your current warfarin dose with next INR in 2 weeks       Summary  As of 5/4/2022    Full warfarin instructions:  5 mg every day   Next INR check:  5/18/2022             Telephone call with Nicko who agrees to plan and repeated back plan correctly    Lab visit scheduled    Education provided: Importance of consistent vitamin K intake, Importance of taking warfarin as instructed and Contact [phone]  with any changes, questions or concerns.     Plan made per [facility] anticoagulation protocol    Nova John RN  Anticoagulation Clinic  5/4/2022    _______________________________________________________________________     Anticoagulation Episode Summary     Current INR goal:  2.5-3.5   TTR:  57.8 % (1 y)   Target end date:  Indefinite   Send INR reminders to:  AIDA GUERRERO    Indications    PAF (paroxysmal atrial fibrillation) (H) [I48.0]  Long term current use of anticoagulant therapy (Resolved) [Z79.01]  Mechanical AV Replacement 1992 -- on Warfarin  [Z95.2]           Comments:           Anticoagulation Care Providers     Provider Role Specialty  Phone number    Obdulio Ingram MD Referring Internal Medicine [phone]

## 2022-05-08 DIAGNOSIS — M62.830 BACK MUSCLE SPASM: ICD-10-CM

## 2022-05-09 RX ORDER — DIAZEPAM 5 MG
5 TABLET ORAL
Qty: 30 TABLET | Refills: 0 | Status: SHIPPED | OUTPATIENT
Start: 2022-05-09 | End: 2022-06-01

## 2022-05-11 ENCOUNTER — OFFICE VISIT (OUTPATIENT)
Dept: ORTHOPEDICS | Facility: CLINIC | Age: 67
End: 2022-05-11
Payer: MEDICARE

## 2022-05-11 VITALS
HEIGHT: 74 IN | SYSTOLIC BLOOD PRESSURE: 136 MMHG | WEIGHT: 309 LBS | DIASTOLIC BLOOD PRESSURE: 82 MMHG | BODY MASS INDEX: 39.66 KG/M2

## 2022-05-11 DIAGNOSIS — G89.29 CHRONIC LEFT SHOULDER PAIN: Primary | ICD-10-CM

## 2022-05-11 DIAGNOSIS — M19.012 PRIMARY OSTEOARTHRITIS OF LEFT SHOULDER: ICD-10-CM

## 2022-05-11 DIAGNOSIS — M25.512 CHRONIC LEFT SHOULDER PAIN: Primary | ICD-10-CM

## 2022-05-11 PROCEDURE — 99213 OFFICE O/P EST LOW 20 MIN: CPT | Mod: 25 | Performed by: ORTHOPAEDIC SURGERY

## 2022-05-11 PROCEDURE — 20610 DRAIN/INJ JOINT/BURSA W/O US: CPT | Mod: LT | Performed by: ORTHOPAEDIC SURGERY

## 2022-05-11 RX ORDER — TESTOSTERONE CYPIONATE 200 MG/ML
2 INJECTION INTRAMUSCULAR
Status: DISCONTINUED | OUTPATIENT
Start: 2022-05-11 | End: 2024-03-18

## 2022-05-11 RX ADMIN — TESTOSTERONE CYPIONATE 2 ML: 200 INJECTION INTRAMUSCULAR at 11:32

## 2022-05-11 NOTE — LETTER
5/11/2022         RE: Todd S Aschoff  [address] [facility]  [place] [address]        Dear Colleague,    Thank you for referring your patient, Todd S Aschoff, to the [facility]. Please see a copy of my visit note below.    HISTORY OF PRESENT ILLNESS:    Todd S Aschoff is a 66 year old male who is seen in follow up for bilatearl shoulder pain.  Patient was previously evaluated on 4/11/22 and obtained a left subacromial cortisone injection, and an MRI of the right shoulder was ordered. He is right hand dominant.  Present symptoms: Patient reports no improvement of left shoulder pain after completion of left subacromial injection.   Current pain level: 8/10  Treatments tried to this point: left subacromial cortisone injection with no improvement of left shoulder pain.   Past Medical History: Unchanged from the visit of 4/11/22. Please refer to that note.    REVIEW OF SYSTEMS:  CONSTITUTIONAL:  NEGATIVE for fever, chills, change in weight  INTEGUMENTARY/SKIN:  NEGATIVE for worrisome rashes, moles or lesions  EYES:  NEGATIVE for vision changes or irritation  ENT/MOUTH:  NEGATIVE for ear, mouth and throat problems  RESP:  NEGATIVE for significant cough or SOB  BREAST:  NEGATIVE for masses, tenderness or discharge  CV:  Irregular heartbeats   GI:  NEGATIVE for nausea, abdominal pain, heartburn, or change in bowel habits  :  Negative   MUSCULOSKELETAL:  See HPI above  NEURO:  NEGATIVE for weakness, dizziness or paresthesias  ENDOCRINE:  NEGATIVE for temperature intolerance, skin/hair changes  HEME/ALLERGY/IMMUNE:  Anticoagulated   PSYCHIATRIC:  Depression       PHYSICAL EXAM:  There were no vitals taken for this visit.  There is no height or weight on file to calculate BMI.   GENERAL APPEARANCE: healthy, alert and no distress   SKIN: no suspicious lesions or rashes  NEURO: Normal strength and tone, mentation intact and speech normal  VASCULAR:  good pulses, and cappillary refill    LYMPH: no lymphadenopathy   PSYCH:  mentation appears normal and affect normal/bright    MSK:  Not in acute distress  Normal gait  No deformities of the shoulders  No erythema or focal swelling  Full range of motion shoulders    Once again significant weakness of external rotation and abduction, right  Left shoulder pain with range of motion with positive impingement sign  Negative arm drop test, left  Full isometric strength, left shoulder none taken today    IMAGING INTERPRETATION: None taken today     ASSESSMENT:  Chronic shoulder pain with combination pathology of rotator cuff tendinosis/impingement and glenohumeral joint DJD    PLAN:  Because of persisting pain at this point, as we outlined on his previous visit, will consider intra-articular glenohumeral joint injection.  Again, this is more for diagnostic as well as therapeutic purposes.    He tolerated the injection well.    If he has persisting pain, a he was instructed to contact us to let us know how he is doing and if he has continuing pain, will obtain MRI scan of the left shoulder.    Continue to work range of motion exercises to prevent frozen shoulder.       Large Joint Injection/Arthocentesis: L glenohumeral joint    Date/Time: 5/11/2022 11:32 AM  Performed by: Scar Dejesus MD  Authorized by: Scar Dejesus MD     Indications:  Pain  Needle Size:  22 G  Guidance: landmark guided    Approach:  Posterolateral  Location:  Shoulder      Site:  L glenohumeral joint  Medications:  2 mL dexamethasone 120 MG/30ML  Medications comment:  8 ml Lidocaine 1%  NDC: 2191-5327-37  LOT: [id number]  Exp: 7/1/23    Outcome:  Tolerated well, no immediate complications  Procedure discussed: discussed risks, benefits, and alternatives    Consent Given by:  Patient  Timeout: timeout called immediately prior to procedure    Prep: patient was prepped and draped in usual sterile fashion            Scar Dejesus MD  Dept. Orthopedic Surgery  [facility]        Disclaimer: This note consists of symbols derived from keyboarding, dictation and/or voice recognition software. As a result, there may be errors in the script that have gone undetected. Please consider this when interpreting information found in this chart.        Again, thank you for allowing me to participate in the care of your patient.        Sincerely,        Scar Dejesus MD

## 2022-05-11 NOTE — PATIENT INSTRUCTIONS
1. Chronic left shoulder pain    2. Primary osteoarthritis of left shoulder      Steroid injection of the left shoulder: intra-articular  was performed today in clinic    - Would not soak in a hot tub, bath or swimming pool for 48 hours  - Ok to shower  - Ice today and only do your normal amounts of activity  - The lidocaine (what is giving you pain relief right now) will likely stop working in 1-2 hours.  You will then have pain again, similar to before you received the injection. The corticosteroid will not start working until approximately 1-2 weeks from now.  In a small percentage of people, cortisone can cause flushing/redness in the face. This usually lasts for 1-3 days and resolves. Cool compress and Ibuprofen/Tylenol can help if this happens.        Follow up with Dr. Dejesus in as needed, call in 4 weeks if pain does not improve for MRI of LEFT shoulder.    Call my office with any questions or concerns, [phone].

## 2022-05-18 ENCOUNTER — LAB (OUTPATIENT)
Dept: LAB | Facility: CLINIC | Age: 67
End: 2022-05-18
Payer: MEDICARE

## 2022-05-18 ENCOUNTER — TELEPHONE (OUTPATIENT)
Dept: ORTHOPEDICS | Facility: CLINIC | Age: 67
End: 2022-05-18

## 2022-05-18 ENCOUNTER — DOCUMENTATION ONLY (OUTPATIENT)
Dept: ANTICOAGULATION | Facility: CLINIC | Age: 67
End: 2022-05-18

## 2022-05-18 ENCOUNTER — ANTICOAGULATION THERAPY VISIT (OUTPATIENT)
Dept: ANTICOAGULATION | Facility: CLINIC | Age: 67
End: 2022-05-18

## 2022-05-18 ENCOUNTER — HOSPITAL ENCOUNTER (OUTPATIENT)
Dept: MRI IMAGING | Facility: CLINIC | Age: 67
Discharge: HOME | End: 2022-05-18
Attending: ORTHOPAEDIC SURGERY | Admitting: ORTHOPAEDIC SURGERY
Payer: COMMERCIAL

## 2022-05-18 DIAGNOSIS — I48.0 PAF (PAROXYSMAL ATRIAL FIBRILLATION) (H): Primary | ICD-10-CM

## 2022-05-18 DIAGNOSIS — M75.101 NONTRAUMATIC TEAR OF RIGHT ROTATOR CUFF, UNSPECIFIED TEAR EXTENT: ICD-10-CM

## 2022-05-18 DIAGNOSIS — Z95.2 AORTIC VALVE PROSTHESIS PRESENT: ICD-10-CM

## 2022-05-18 LAB — INR BLD: 3.3 (ref 0.9–1.1)

## 2022-05-18 PROCEDURE — 36416 COLLJ CAPILLARY BLOOD SPEC: CPT

## 2022-05-18 PROCEDURE — 73221 MRI JOINT UPR EXTREM W/O DYE: CPT | Mod: 26 | Performed by: RADIOLOGY

## 2022-05-18 PROCEDURE — G1004 CDSM NDSC: HCPCS

## 2022-05-18 PROCEDURE — G1004 CDSM NDSC: HCPCS | Performed by: RADIOLOGY

## 2022-05-18 PROCEDURE — 85610 PROTHROMBIN TIME: CPT

## 2022-05-18 NOTE — PROGRESS NOTES
ANTICOAGULATION MANAGEMENT     Todd S Aschoff 66 year old male is on warfarin with subtherapeutic INR result. (Goal INR 2.5-3.5)    Recent labs: (last 7 days)     05/18/22  1548   INR 3.3*       ASSESSMENT       Source(s): Chart Review and Patient/Caregiver Call       Warfarin doses taken: Warfarin taken as instructed    Diet: No new diet changes identified    New illness, injury, or hospitalization: No    Medication/supplement changes: cortisone injection in his shoulder 1 week ago.  MRI done today on his other shoulder.    Signs or symptoms of bleeding or clotting: No    Previous INR: Therapeutic last visit; previously outside of goal range    Additional findings: None       PLAN     Recommended plan for no diet, medication or health factor changes affecting INR     Dosing Instructions: continue your current warfarin dose with next INR in 3 weeks       Summary  As of 5/18/2022    Full warfarin instructions:  5 mg every day   Next INR check:  6/8/2022             Telephone call with Nicko who agrees to plan and repeated back plan correctly    Lab visit scheduled    Education provided: Please call back if any changes to your diet, medications or how you've been taking warfarin and Importance of notifying clinic of upcoming surgeries and procedures 2 weeks in advance    Plan made per [facility] anticoagulation protocol    Amanda Wheeler RN  Anticoagulation Clinic  5/18/2022    _______________________________________________________________________     Anticoagulation Episode Summary     Current INR goal:  2.5-3.5   TTR:  58.8 % (1 y)   Target end date:  Indefinite   Send INR reminders to:  AIDA GUERRERO    Indications    PAF (paroxysmal atrial fibrillation) (H) [I48.0]  Long term current use of anticoagulant therapy (Resolved) [Z79.01]  Mechanical AV Replacement 1992 -- on Warfarin  [Z95.2]           Comments:           Anticoagulation Care Providers     Provider Role Specialty Phone number    Obdulio Ingram MD Referring  Internal Medicine [phone]

## 2022-05-18 NOTE — PROGRESS NOTES
ANTICOAGULATION MANAGEMENT      Todd S Aschoff due for annual renewal of referral to anticoagulation monitoring. Order pended for your review and signature.      ANTICOAGULATION SUMMARY      Warfarin indication(s)     Atrial fibrillation  Heart Valve Replacement    Heart valve present?  Mechanical  AVR-Bileaflet       Current goal range   INR: 2.5-3.5     Goal appropriate for indication? Yes, INR 2.5-3.5 appropriate for hx  Mechanical MVR, Mechanical AVR with risk factors or older generation (Laura-Shiley (Tilting disc), Rizo-Farmer (Caged Ball) or Monoleaflet valve)     Current duration of therapy Indefinite/long term therapy   Time in Therapeutic Range (TTR)  (Goal > 60%) 58.8%       Office visit with referring provider's group within last year yes on 12/7/21       Amanda Wheeler RN

## 2022-05-18 NOTE — TELEPHONE ENCOUNTER
Message received from MRI staff regarding patient request to change laterality of shoulder MRI.   Patient was recently evaluated on 5/11/22 and obtained a left shoulder injection.   Per provider instruction, patient to call office to follow up in 4 weeks if pain has not improved, and MRI order will be placed.     MRI staff informed patient order can not be changed, and will remind patient to notify clinic of shoulder status in 4 weeks.     Luz Recinos, ATC

## 2022-05-22 NOTE — PROGRESS NOTES
- Home Atorvastatin       ANTICOAGULATION MANAGEMENT     Patient Name:  Todd S Aschoff  Date:  5/10/2021    ASSESSMENT /SUBJECTIVE:    Today's INR result of 2.4 is subtherapeutic. Goal INR of 2.5-3.5      Warfarin dose taken: Warfarin taken as instructed    Diet: No new diet changes affecting INR    Medication changes/ interactions: No new medications/supplements affecting INR    Previous INR: Therapeutic 3.5    S/S of bleeding or thromboembolism: No    New injury or illness: No    Upcoming surgery, procedure or cardioversion: No    Additional findings: None      PLAN:    Telephone call with home care nurse Vanessa BOOTHE  regarding INR result and instructed:     Warfarin Dosing Instructions: Change your warfarin dose to 5mg Sun/Wed/Fri & 7.5mg AOD  . (5.9 % change)    Instructed patient to follow up no later than:   Patient to recheck with home meter    Education provided: Contact [facility] Anticoagulation: [phone]  with any changes, questions or concerns.       Vanessa BOOTHE verbalizes understanding and agrees to warfarin dosing plan.    Instructed to call the Anticoagulation Clinic for any changes, questions or concerns. (#[phone])        Linda Tang RN      OBJECTIVE:  Recent labs: (last 7 days)     05/07/21 05/10/21   INR 3.5* 2.4*         No question data found.  Anticoagulation Summary  As of 5/10/2021    INR goal:  2.5-3.5   TTR:  43.0 % (10.2 mo)   INR used for dosin.4 (5/10/2021)   Warfarin maintenance plan:  5 mg (5 mg x 1) every Sun, Wed, Fri; 7.5 mg (5 mg x 1.5) all other days   Full warfarin instructions:  5 mg every Sun, Wed, Fri; 7.5 mg all other days   Weekly warfarin total:  45 mg   Plan last modified:  Linda Tang, RN (5/10/2021)   Next INR check:  2021   Priority:  High   Target end date:  Indefinite    Indications    Aortic valve prosthesis present [Z95.2]  Atrial fibrillation (H) [I48.91]  Long term current use of anticoagulant therapy [Z79.01]  Chronic atrial fibrillation (H)  [I48.20]             Anticoagulation Episode Summary     INR check location:      Preferred lab:  EXTERNAL LAB    Send INR reminders to:  AIDA MCWILLIAMS    Comments:  5mg tabs / CALENDAR / needs bridging. may leave DVM or speak with Paul, per signed consent // Home care      Anticoagulation Care Providers     Provider Role Specialty Phone number    Obdulio Ingram MD Referring Internal Medicine [phone]         Linda Morales RN, BSN, PHN

## 2022-05-27 ENCOUNTER — HOSPITAL ENCOUNTER (OUTPATIENT)
Dept: WOUND CARE | Facility: CLINIC | Age: 67
Discharge: HOME | End: 2022-05-27
Attending: PHYSICIAN ASSISTANT | Admitting: PHYSICIAN ASSISTANT
Payer: COMMERCIAL

## 2022-05-27 VITALS — HEART RATE: 82 BPM | DIASTOLIC BLOOD PRESSURE: 94 MMHG | TEMPERATURE: 96.9 F | SYSTOLIC BLOOD PRESSURE: 153 MMHG

## 2022-05-27 DIAGNOSIS — T81.31XA POSTOPERATIVE WOUND DEHISCENCE, INITIAL ENCOUNTER: ICD-10-CM

## 2022-05-27 DIAGNOSIS — T81.31XA POSTOPERATIVE WOUND DEHISCENCE: Primary | ICD-10-CM

## 2022-05-27 PROCEDURE — 99214 OFFICE O/P EST MOD 30 MIN: CPT | Performed by: PHYSICIAN ASSISTANT

## 2022-05-27 PROCEDURE — G0463 HOSPITAL OUTPT CLINIC VISIT: HCPCS

## 2022-05-27 NOTE — PROGRESS NOTES
[facility]    ASSESSMENT:   1. S/p Suture abscess    PLAN/DISCUSSION:   1. Areas are well healed with no sign of infection or ongoing issue. Educated that areas will be hyperpigmented likely indefinitely. No ongoing tx needed  2. Follow-up PRN    HISTORY OF PRESENT ILLNESS:   Todd S Aschoff is a 66 year old male with history of spinal surgery in 2017 who presented several weeks ago for draining and painful nodules within his old incision. He notes that his surgery was quite complicated and required revision. He was doing ok until the last 8 months when he appreciated the two nodules. He notes that some suture material had already expelled from the area.  He did have follow-up with spine surgery who ordered MRI. They had no concerns for deeper pathology and referred him to our clinic. At our initial visit the areas were incised and large knots were snipped out. He states that after 4 days the areas healed over. He has not had any drainage or irritation since.     VITALS: BP (!) 153/94 (BP Location: Left arm)   Pulse 82   Temp 96.9  F (36.1  C)      PHYSICAL EXAM:  GENERAL: Patient is alert and oriented and in no acute distress  INTEGUMENTARY: well healed spinal incision, two firm areas consistent with scar tissue where previous sutures were snipped        MDM: 30-39 minutes were spent on the date of the visit reviewing previous chart notes, evaluating patient and developing the treatment plan, this excludes any time spent on procedures.

## 2022-05-27 NOTE — PROGRESS NOTES
[facility]  [address] [facility] [address], [facility] [address]  Appointment Phone [phone]     Todd S Aschoff      1955    Your wound is healed!! Dressings are no longer required. Education provided to Patient and all concerns addressed.    Wound Clinic follow up in the future if there are any wound concerns     Rhianna Golden PA-C May 27, 2022    Call us at [phone] if you have any questions about your wounds, have redness or swelling around your wound, have a fever of 101 or greater or if you have any other problems or concerns. We answer the phone Monday through Friday 8 am to 4 pm, please leave a message as we check the voicemail frequently throughout the day.

## 2022-05-27 NOTE — DISCHARGE INSTRUCTIONS
[facility]  [address] [facility] [address], [facility] [address]  Appointment Phone [phone]     Nicko SHINE Aschoff      1955    Your wound is healed!! Dressings are no longer required.     Wound Clinic follow up in the future if there are any wound concerns or suture spitting     Rhianna Golden PA-C May 27, 2022    Call us at [phone] if you have any questions about your wounds, have redness or swelling around your wound, have a fever of 101 or greater or if you have any other problems or concerns. We answer the phone Monday through Friday 8 am to 4 pm, please leave a message as we check the voicemail frequently throughout the day.

## 2022-06-01 DIAGNOSIS — M62.830 BACK MUSCLE SPASM: ICD-10-CM

## 2022-06-01 RX ORDER — DIAZEPAM 5 MG
5 TABLET ORAL
Qty: 30 TABLET | Refills: 0 | Status: SHIPPED | OUTPATIENT
Start: 2022-06-08 | End: 2022-07-05

## 2022-06-03 ENCOUNTER — OFFICE VISIT (OUTPATIENT)
Dept: URGENT CARE | Facility: URGENT CARE | Age: 67
End: 2022-06-03
Payer: MEDICARE

## 2022-06-03 VITALS
HEART RATE: 78 BPM | SYSTOLIC BLOOD PRESSURE: 148 MMHG | DIASTOLIC BLOOD PRESSURE: 80 MMHG | OXYGEN SATURATION: 98 % | RESPIRATION RATE: 20 BRPM | TEMPERATURE: 98 F

## 2022-06-03 DIAGNOSIS — M77.8 TENDONITIS OF WRIST, RIGHT: Primary | ICD-10-CM

## 2022-06-03 PROCEDURE — 99213 OFFICE O/P EST LOW 20 MIN: CPT | Performed by: FAMILY MEDICINE

## 2022-06-03 RX ORDER — METHYLPREDNISOLONE 4 MG
4 TABLET, DOSE PACK ORAL SEE ADMIN INSTRUCTIONS
Qty: 21 TABLET | Refills: 0 | Status: SHIPPED | OUTPATIENT
Start: 2022-06-03 | End: 2022-09-02
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
## 2022-06-08 ENCOUNTER — TELEPHONE (OUTPATIENT)
Dept: ORTHOPEDICS | Facility: CLINIC | Age: 67
End: 2022-06-08

## 2022-06-08 ENCOUNTER — LAB (OUTPATIENT)
Dept: LAB | Facility: CLINIC | Age: 67
End: 2022-06-08
Payer: MEDICARE

## 2022-06-08 ENCOUNTER — ANTICOAGULATION THERAPY VISIT (OUTPATIENT)
Dept: ANTICOAGULATION | Facility: CLINIC | Age: 67
End: 2022-06-08

## 2022-06-08 DIAGNOSIS — Z95.2 AORTIC VALVE PROSTHESIS PRESENT: ICD-10-CM

## 2022-06-08 DIAGNOSIS — I48.0 PAF (PAROXYSMAL ATRIAL FIBRILLATION) (H): Primary | ICD-10-CM

## 2022-06-08 LAB — INR BLD: 2.9 (ref 0.9–1.1)

## 2022-06-08 PROCEDURE — 36416 COLLJ CAPILLARY BLOOD SPEC: CPT

## 2022-06-08 PROCEDURE — 85610 PROTHROMBIN TIME: CPT

## 2022-06-08 NOTE — TELEPHONE ENCOUNTER
Called him and went over the result today.  OTC meds/Observation for now and possible PT if right shoulder pain continues.

## 2022-06-08 NOTE — TELEPHONE ENCOUNTER
Patient calls stating he never heard back regarding MRI results of right shoulder.   Please advise if you are able to call patient or if he needs to return to the office to discuss results.     DASHA Geiger RN      EXAM: MR right shoulder without  contrast 5/18/2022 2:58 PM     TECHNIQUE: Multiplanar, multisequence imaging of the right shoulder  were obtained without administration of intravenous or intra-articular  gadolinium contrast using routine protocol.     History: Shoulder pain, rotator cuff disorder suspected, xray done;  Nontraumatic tear of right rotator cuff, unspecified tear extent     Comparison: Radiographs 12/30/2014     Findings:     ROTATOR CUFF and ASSOCIATED STRUCTURES  Rotator cuff: Postsurgical changes of the previous rotator cuff repair  with surgical screws in the greater and lesser tuberosities. On a  background of tendinosis, tiny foci of very low-grade intrasubstance  tearing and medial delamination tearing involving the supraspinatus  and infraspinatus without high-grade tearing.     On background of tendinosis, small low to moderate grade focal  articular sided tear of the subscapularis upper fibers adjacent to the  footprint. No high-grade tear.     Bursa: Trace subacromial/subdeltoid and subcoracoid bursal fluid with  synovial proliferation, query mild bursitis.     Musculature: Mild to moderate fatty infiltration of the infraspinatus  tendinous junction; otherwise, rotator cuff muscle bulk are preserved.  Deltoid muscle bulk is also preserved.  No muscle edema.     Acromioclavicular joint  There are severe degenerative changes of the acromioclavicular joint.  Acromion is type 2 in sagittal morphology.  Coracoacromial ligament is  not thickened.     OSSEOUS STRUCTURES  No fracture, marrow contusion or marrow infiltration. Subcortical  cystlike change of the acromion undersurface.     LONG BICIPITAL TENDON  Nonvisualization, presumably secondary to previous biceps tenotomy  with tendon  retracted beyond the field-of-view caudally.     GLENOHUMERAL JOINT  Joint fluid: Small joint effusion with synovial proliferation/internal  Debris.    Cartilage and subarticular bone:  No focal hyaline cartilage defects  are noted. No Hill-Sachs, reverse Hill-Sachs, or bony Bankart lesions  are seen.     Labrum: Limited assessment on this study with relative lack of joint  distention shows blunted superior and posterosuperior labrum, possibly  torn.     ANCILLARY FINDINGS:                                                                   Impression:  1. In this patient with prior rotator cuff repair, no high grade  retear. Relative preservation of muscle bulk.   a. Tiny very low grade tear foci in supraspinatus and infraspinatus.   b. Small focal low to moderate grade tear of subscapularis upper  fibers adjacent to the footprint.  2. Presumed changes of biceps tenotomy.  3. Severe acromioclavicular joint degenerative change.  4. Blunted superior and posterosuperior labrum, possibly torn.  5. Query subacromial/subdeltoid and subcoracoid mild bursitis.     ANNA MARIE ANDRADE

## 2022-06-08 NOTE — PROGRESS NOTES
ANTICOAGULATION MANAGEMENT     Todd S Aschoff 66 year old male is on warfarin with therapeutic INR result. (Goal INR 2.5-3.5)    Recent labs: (last 7 days)     06/08/22  1131   INR 2.9*       ASSESSMENT       Source(s): Chart Review       Warfarin doses taken: Reviewed in chart    Diet: No new diet changes identified    New illness, injury, or hospitalization: Yes: Seen in  6/3/22 for right wrist tendonitis    Medication/supplement changes: Medrol dose ernst. Corticosteroids (Systemic) may enhance the anticoagulant effect of Vitamin K Antagonists    Signs or symptoms of bleeding or clotting: No    Previous INR: Therapeutic last 2(+) visits    Additional findings: None       PLAN     Unable to reach Nicko today.    Left message to continue current dose of warfarin 5 mg tonight. Request call back for assessment.    Follow up required to confirm warfarin dose taken and assess for changes    Nanci Preston RN  Anticoagulation Clinic  6/8/2022

## 2022-06-09 NOTE — PROGRESS NOTES
ANTICOAGULATION MANAGEMENT     Todd S Aschoff 66 year old male is on warfarin with therapeutic INR result. (Goal INR 2.5-3.5)    Recent labs: (last 7 days)     06/08/22  1131   INR 2.9*       ASSESSMENT       Source(s): Chart Review and Patient/Caregiver Call       Warfarin doses taken: Warfarin taken as instructed    Diet: No new diet changes identified    New illness, injury, or hospitalization: Yes: seen in  6/3 for tendonitis    Medication/supplement changes: finished prednisone    Signs or symptoms of bleeding or clotting: Yes: bruising improved    Previous INR: Therapeutic last 2(+) visits    Additional findings: None       PLAN     Recommended plan for no diet, medication or health factor changes affecting INR     Dosing Instructions: continue your current warfarin dose with next INR in 4 weeks       Summary  As of 6/8/2022    Full warfarin instructions:  5 mg every day   Next INR check:  7/7/2022             Telephone call with Nicko who verbalizes understanding and agrees to plan    Lab visit scheduled    Education provided: Please call back if any changes to your diet, medications or how you've been taking warfarin    Plan made per [facility] anticoagulation protocol    Joy Frazier RN  Anticoagulation Clinic  6/9/2022    _______________________________________________________________________     Anticoagulation Episode Summary     Current INR goal:  2.5-3.5   TTR:  60.5 % (1 y)   Target end date:  Indefinite   Send INR reminders to:  AIDA GUERRERO    Indications    PAF (paroxysmal atrial fibrillation) (H) [I48.0]  Long term current use of anticoagulant therapy (Resolved) [Z79.01]  Mechanical AV Replacement 1992 -- on Warfarin  [Z95.2]           Comments:           Anticoagulation Care Providers     Provider Role Specialty Phone number    Obdulio Ingram MD Referring Internal Medicine [phone]    Herve Morgan MD Referring Internal Medicine - Pediatrics [phone]

## 2022-06-15 ENCOUNTER — TELEPHONE (OUTPATIENT)
Dept: PEDIATRICS | Facility: CLINIC | Age: 67
End: 2022-06-15

## 2022-06-15 DIAGNOSIS — R35.0 URINARY FREQUENCY: ICD-10-CM

## 2022-06-15 NOTE — TELEPHONE ENCOUNTER
Received call from pt  He wanted to know if we needed to start a PA for his Myrbetriq    Spoke with pharmacy- they said pt does need a PA    Call placed to pt to let him know    Routing to [facility] [place]    Thank you  Devorah Nieves RN on 6/15/2022 at 1:18 PM

## 2022-06-20 NOTE — TELEPHONE ENCOUNTER
Central Prior Authorization Team  Phone: [phone]    PA Initiation    Medication: myrbetriq  Insurance Company:    Pharmacy Filling the Rx: [id number] - [place], [place] - [address]  Filling Pharmacy Phone: [phone]  Filling Pharmacy Fax:    Start Date: 6/20/2022

## 2022-06-21 NOTE — TELEPHONE ENCOUNTER
Central Prior Authorization Team  Phone: [phone]    PRIOR AUTHORIZATION DENIED    Medication: myrbetriq    Denial Date:  06/21/2022    Denial Rational:     Appeal Information: If you would like to appeal, please provide a letter of medical necessity and route back to the team.

## 2022-06-22 NOTE — TELEPHONE ENCOUNTER
Patient called back and would like to get a refill of Myrbetriq. This is the only medication that worked for him. He tried two others and they didn't work. Prior Auth was done and insurance denied it. He has been out of this medication for one month. Please let him know what can be done.

## 2022-06-23 RX ORDER — MIRABEGRON 50 MG/1
50 TABLET, EXTENDED RELEASE ORAL DAILY
Qty: 90 TABLET | Refills: 3 | Status: SHIPPED | OUTPATIENT
Start: 2022-06-23 | End: 2022-09-02

## 2022-06-29 ENCOUNTER — OFFICE VISIT (OUTPATIENT)
Dept: URGENT CARE | Facility: URGENT CARE | Age: 67
End: 2022-06-29
Payer: MEDICARE

## 2022-06-29 VITALS
SYSTOLIC BLOOD PRESSURE: 140 MMHG | OXYGEN SATURATION: 95 % | DIASTOLIC BLOOD PRESSURE: 92 MMHG | TEMPERATURE: 97 F | HEART RATE: 76 BPM

## 2022-06-29 DIAGNOSIS — F32.A DEPRESSION, UNSPECIFIED DEPRESSION TYPE: ICD-10-CM

## 2022-06-29 DIAGNOSIS — S99.922A FOOT INJURY, LEFT, INITIAL ENCOUNTER: Primary | ICD-10-CM

## 2022-06-29 DIAGNOSIS — Z95.2 AORTIC VALVE PROSTHESIS PRESENT: ICD-10-CM

## 2022-06-29 DIAGNOSIS — S92.415A CLOSED NONDISPLACED FRACTURE OF PROXIMAL PHALANX OF LEFT GREAT TOE, INITIAL ENCOUNTER: ICD-10-CM

## 2022-06-29 DIAGNOSIS — I48.20 CHRONIC ATRIAL FIBRILLATION (H): ICD-10-CM

## 2022-06-29 PROCEDURE — 99214 OFFICE O/P EST MOD 30 MIN: CPT | Performed by: FAMILY MEDICINE

## 2022-06-29 RX ORDER — TRAMADOL HYDROCHLORIDE 50 MG/1
50 TABLET ORAL EVERY 6 HOURS PRN
Qty: 10 TABLET | Refills: 0 | Status: SHIPPED | OUTPATIENT
Start: 2022-06-29 | End: 2022-07-02

## 2022-06-29 NOTE — TELEPHONE ENCOUNTER
Patient requesting refill on:   venlafaxine (EFFEXOR-XR) 75 MG 24 hr capsule    Trinidad Gillette MA

## 2022-06-29 NOTE — PROGRESS NOTES
Assessment & Plan     Foot injury, left, initial encounter  Fracture of proximal great toe minimal displacement, comminuted  - XR Foot Left G/E 3 Views  - Ankle/Foot Bracing Supplies DME  - Orthopedic  Referral  - traMADol (ULTRAM) 50 MG tablet  Dispense: 10 tablet; Refill: 0    With consideration the patient's pain having 8 out of 10 pain while walking we will start with Tylenol as needed for pain given he is on an anticoagulant warfarin will avoid NSAIDs I did provide him with tramadol which has slight interaction with the anticoagulant I feel the risks outweigh the benefits in this scenario patient is advised to follow-up in 2 weeks with podiatry a postop shoe was provided to him today.    Geraldo Barragan MD   [facility]    Obinna Maharaj is a 66 year old male who presents to clinic today for the following health issues:  Chief Complaint   Patient presents with     Urgent Care     Musculoskeletal Problem     Fell down stairs Sunday-Left foot injury shooting pains in big toe area     HPI    Patient sustained a fall coming down the stairs 3 days ago since then he has had healing abrasions on his foot but now has pain of the midfoot rating to his great toe.  No previous fractures noted.  He has no pain or discomfort with movement of his ankle joint.  No pain of the lateral foot.      Patient Active Problem List    Diagnosis Date Noted     Postoperative wound dehiscence 04/22/2022     Priority: Medium     Traumatic hematoma of flank, initial encounter 01/18/2022     Priority: Medium     Fall, initial encounter 01/18/2022     Priority: Medium     Closed fracture of transverse process of cervical vertebra, initial encounter (H) 01/18/2022     Priority: Medium     Closed fracture of multiple ribs of right side, initial encounter 01/18/2022     Priority: Medium     Family hx of prostate cancer 12/07/2021     Priority: Medium     Left Asad-Paresthesias 06/18/2021     Priority: Medium     Spells  of decreased attentiveness 06/18/2021     Priority: Medium     Hx of CVA x 4, 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 04/18/2021     Priority: Medium     Polypharmacy 01/22/2021     Priority: Medium     Renal insufficiency 01/22/2021     Priority: Medium     Hypotension, unspecified hypotension type 01/22/2021     Priority: Medium     Anemia, unspecified type 01/22/2021     Priority: Medium     Leg wound, left 01/09/2021     Priority: Medium     Hematoma from fall, followed by infection and skin graft       Suicide attempt by multiple drug overdose, initial encounter (H) 11/27/2020     Priority: Medium     Depression, unspecified depression type 11/27/2020     Priority: Medium     Elevated prostate specific antigen (PSA) 01/22/2020     Priority: Medium     Alcohol dependence in remission (H) 08/02/2018     Priority: Medium     S/P lumbar fusion 04/05/2018     Priority: Medium     Spinal stenosis of lumbar region 04/05/2018     Priority: Medium     Obesity, BMI >35 with comorbidities 04/28/2017     Priority: Medium     Bilateral thoracic back pain 11/16/2016     Priority: Medium     Valium 5 mg at bedtime for years, per patient.         OAB (overactive bladder) 05/11/2015     Priority: Medium     Rotator cuff tear 01/26/2015     Priority: Medium     Overview:   Rotator cuff tear, right  Formatting of this note might be different from the original.  Rotator cuff tear, right       Memory difficulties 01/16/2015     Priority: Medium     Hx of Thoracic Dissection repair -- 1992 04/22/2013     Priority: Medium     Lumbar radiculopathy 04/03/2013     Priority: Medium     Obesity 03/25/2013     Priority: Medium     Tourette syndrome 10/30/2012     Priority: Medium     Chronic low back pain 08/19/2011     Priority: Medium     BPH (benign prostatic hypertrophy)      Priority: Medium     Follows with urology.        HYPERLIPIDEMIA LDL GOAL <130 10/31/2010     Priority: Medium     Mechanical AV Replacement 1992 -- on Warfarin       Priority: Medium      Bridging dose of lovenox = 105 mg twice daily.        Radiculitis 04/20/2010     Priority: Medium     Overview:   LW Modifier:  Right foot, s/p R AMANDA  LW Onset:  2002  ; Neuralgia  Formatting of this note might be different from the original.  LW Modifier:  Right foot, s/p R AMANDA  LW Onset:  2002  ; Neuralgia       PAF (paroxysmal atrial fibrillation) (H) 08/07/2002     Priority: Medium     Hypothyroidism 08/07/2002     Priority: Medium     Problem list name updated by automated process. Provider to review         Current Outpatient Medications   Medication     acetaminophen (TYLENOL) 500 MG tablet     aspirin (ASA) 81 MG chewable tablet     cetirizine (ZYRTEC) 10 MG tablet     cyclobenzaprine (FLEXERIL) 10 MG tablet     diazepam (VALIUM) 5 MG tablet     docusate sodium (COLACE) 100 MG capsule     donepezil (ARICEPT) 10 MG tablet     folic acid (FOLVITE) 1 MG tablet     guanFACINE (TENEX) 1 MG tablet     lamoTRIgine (LAMICTAL) 25 MG tablet     levothyroxine (SYNTHROID/LEVOTHROID) 150 MCG tablet     liothyronine (CYTOMEL) 25 MCG tablet     mirabegron (MYRBETRIQ) 50 MG 24 hr tablet     oxyCODONE (ROXICODONE) 5 MG tablet     oxyCODONE-acetaminophen (PERCOCET) 5-325 MG tablet     pregabalin (LYRICA) 100 MG capsule     propafenone (RYTHMOL) 150 MG TABS tablet     senna-docusate (SENOKOT-S/PERICOLACE) 8.6-50 MG tablet     simvastatin (ZOCOR) 40 MG tablet     traMADol (ULTRAM) 50 MG tablet     venlafaxine (EFFEXOR-XR) 75 MG 24 hr capsule     warfarin ANTICOAGULANT (COUMADIN) 2.5 MG tablet     ciprofloxacin (CIPRO) 500 MG tablet     Lidocaine (LIDOCARE) 4 % Patch     methocarbamol (ROBAXIN) 500 MG tablet     methylPREDNISolone (MEDROL) 4 MG tablet therapy pack     warfarin ANTICOAGULANT (COUMADIN) 5 MG tablet     Current Facility-Administered Medications   Medication     dexamethasone (DECADRON) injection 2 mL         Objective    BP (!) 140/92   Pulse 76   Temp 97  F (36.1  C) (Temporal)   SpO2 95%   Physical Exam      L foot: no pain of lateral foot or metatarsal there is midfoot dorsal discomfort radiating to the great toe there is mild swelling. Superficial healing abrasions on the dorsum.       Xray per my read as reviewed with patient showed a proximal phalanx fracture in multiple fragments ( comminuted) no significant displacement  Results for orders placed or performed in visit on 06/29/22   XR Foot Left G/E 3 Views     Status: None    Narrative    EXAM: XR FOOT LEFT G/E 3 VIEWS  LOCATION: [facility]  DATE/TIME: 6/29/2022 5:31 PM    INDICATION: Left foot pain after a fall.  COMPARISON: None.      Impression    IMPRESSION: Acute comminuted fracture of the proximal phalanx in the left great toe. There are multiple longitudinal and obliquely oriented fracture lines, with multiple lucencies in the proximal articular surface at the MTP joint, and at least one line   extending to the distal articular surface in the interphalangeal joint. Fracture components are minimally displaced and impacted, 1 to 2 mm. No significant incongruity in either articular surface. Joint alignment remains normal. There is moderate soft   tissue swelling in the great toe. No additional acute fracture is visualized the left foot. Joint alignment is normal elsewhere. Moderate arterial calcifications are present in the digital arteries, raising a question of underlying diabetes.             The use of Dragon/[employer] dictation services may have been used to construct the content in this note; any grammatical or spelling errors are non-intentional. Please contact the author of this note directly if you are in need of any clarification.

## 2022-06-29 NOTE — PATIENT INSTRUCTIONS
The toe should heal over 4-6 weeks      Follow up with Podiatry in 2 weeks to re-examine the toe possibly repeat xrays  Call [phone] to schedule an appointment with the Podiatrist      Use the post op shoe or a boot with a hard bottom sole to help reduce pressure to the front of your foot        Tylenol 650mg every 4-6 hours for pain   Tramadol every 8 hours as needed for pain

## 2022-06-30 RX ORDER — WARFARIN SODIUM 5 MG/1
TABLET ORAL
Qty: 90 TABLET | Refills: 1 | Status: SHIPPED | OUTPATIENT
Start: 2022-06-30 | End: 2022-12-12

## 2022-06-30 NOTE — TELEPHONE ENCOUNTER
ANTICOAGULATION MANAGEMENT:  Medication Refill    Anticoagulation Summary  As of 6/8/2022    Warfarin maintenance plan:  5 mg (5 mg x 1) every day   Next INR check:  7/7/2022   Target end date:  Indefinite    Indications    PAF (paroxysmal atrial fibrillation) (H) [I48.0]  Long term current use of anticoagulant therapy (Resolved) [Z79.01]  Mechanical AV Replacement 1992 -- on Warfarin  [Z95.2]             Anticoagulation Care Providers     Provider Role Specialty Phone number    Obdulio Ingram MD Referring Internal Medicine [phone]    Herve Morgan MD Referring Internal Medicine - Pediatrics [phone]          Visit with referring provider/group within last year: Yes    [facility] referral signed within last year: Yes    Nicko meets all criteria for refill (current [facility] referral, office visit with referring provider/group in last year, lab monitoring up to date or not exceeding 2 weeks overdue). Rx instructions and quantity match patient's current dosing plan. Warfarin 90 day supply with 1 refill granted per [facility] protocol     Trinidad Shaikh RN  Anticoagulation Clinic

## 2022-07-01 ENCOUNTER — TELEPHONE (OUTPATIENT)
Dept: PEDIATRICS | Facility: CLINIC | Age: 67
End: 2022-07-01

## 2022-07-01 RX ORDER — VENLAFAXINE HYDROCHLORIDE 75 MG/1
225 CAPSULE, EXTENDED RELEASE ORAL DAILY
Qty: 270 CAPSULE | Refills: 1 | Status: SHIPPED | OUTPATIENT
Start: 2022-07-01 | End: 2022-12-22

## 2022-07-01 NOTE — TELEPHONE ENCOUNTER
Central Prior Authorization Team   Phone: [phone]    PA Initiation    Medication: mirabegron (MYRBETRIQ) 50 MG 24 hr tablet - INITIATED  Insurance Company: [employer] ([facility]) - Phone [phone] Fax [phone]  Pharmacy Filling the Rx: [id number] - [place], [place] - [address]  Filling Pharmacy Phone: [phone]  Filling Pharmacy Fax:    Start Date: 7/1/2022

## 2022-07-01 NOTE — TELEPHONE ENCOUNTER
Routing refill request to provider for review/approval because:  Labs out of range:  BP  Labs not current: PHQ-9  Patient needs to be seen because:  due to be seen  BP Readings from Last 3 Encounters:   06/29/22 (!) 140/92   06/03/22 (!) 148/80   05/27/22 (!) 153/94       PHQ 12/11/2020 8/16/2021 12/8/2021   PHQ-9 Total Score 7 0 0   Q9: Thoughts of better off dead/self-harm past 2 weeks Several days Not at all Not at all       Rosa Elena Duran, RN

## 2022-07-01 NOTE — TELEPHONE ENCOUNTER
"Prior Authorization Retail Medication Request  Patient calling to report he needs prior authorization for:    mirabegron (MYRBETRIQ) 50 MG 24 hr tablet 90 tablet 3 6/23/2022     Patient also states, \"they need the records from the past showing I've been taking this for years.\"    ICD code (if different than what is on RX):    Previously Tried and Failed:    Rationale:      Insurance Name:    Insurance ID:        Pharmacy Information (if different than what is on RX)  Name:    Phone:           Magui Andrew RN     "

## 2022-07-02 NOTE — PROGRESS NOTES
HISTORY OF PRESENT ILLNESS:    Todd S Aschoff is a 66 year old male who is seen in follow up for bilatearl shoulder pain.  Patient was previously evaluated on 4/11/22 and obtained a left subacromial cortisone injection, and an MRI of the right shoulder was ordered. He is right hand dominant.  Present symptoms: Patient reports no improvement of left shoulder pain after completion of left subacromial injection.   Current pain level: 8/10  Treatments tried to this point: left subacromial cortisone injection with no improvement of left shoulder pain.   Past Medical History: Unchanged from the visit of 4/11/22. Please refer to that note.    REVIEW OF SYSTEMS:  CONSTITUTIONAL:  NEGATIVE for fever, chills, change in weight  INTEGUMENTARY/SKIN:  NEGATIVE for worrisome rashes, moles or lesions  EYES:  NEGATIVE for vision changes or irritation  ENT/MOUTH:  NEGATIVE for ear, mouth and throat problems  RESP:  NEGATIVE for significant cough or SOB  BREAST:  NEGATIVE for masses, tenderness or discharge  CV:  Irregular heartbeats   GI:  NEGATIVE for nausea, abdominal pain, heartburn, or change in bowel habits  :  Negative   MUSCULOSKELETAL:  See HPI above  NEURO:  NEGATIVE for weakness, dizziness or paresthesias  ENDOCRINE:  NEGATIVE for temperature intolerance, skin/hair changes  HEME/ALLERGY/IMMUNE:  Anticoagulated   PSYCHIATRIC:  Depression       PHYSICAL EXAM:  There were no vitals taken for this visit.  There is no height or weight on file to calculate BMI.   GENERAL APPEARANCE: healthy, alert and no distress   SKIN: no suspicious lesions or rashes  NEURO: Normal strength and tone, mentation intact and speech normal  VASCULAR:  good pulses, and cappillary refill   LYMPH: no lymphadenopathy   PSYCH:  mentation appears normal and affect normal/bright    MSK:  Not in acute distress  Normal gait  No deformities of the shoulders  No erythema or focal swelling  Full range of motion shoulders    Once again significant weakness of  external rotation and abduction, right  Left shoulder pain with range of motion with positive impingement sign  Negative arm drop test, left  Full isometric strength, left shoulder none taken today    IMAGING INTERPRETATION: None taken today     ASSESSMENT:  Chronic shoulder pain with combination pathology of rotator cuff tendinosis/impingement and glenohumeral joint DJD    PLAN:  Because of persisting pain at this point, as we outlined on his previous visit, will consider intra-articular glenohumeral joint injection.  Again, this is more for diagnostic as well as therapeutic purposes.    He tolerated the injection well.    If he has persisting pain, a he was instructed to contact us to let us know how he is doing and if he has continuing pain, will obtain MRI scan of the left shoulder.    Continue to work range of motion exercises to prevent frozen shoulder.       Large Joint Injection/Arthocentesis: L glenohumeral joint    Date/Time: 5/11/2022 11:32 AM  Performed by: Scar Dejesus MD  Authorized by: Scar Dejesus MD     Indications:  Pain  Needle Size:  22 G  Guidance: landmark guided    Approach:  Posterolateral  Location:  Shoulder      Site:  L glenohumeral joint  Medications:  2 mL dexamethasone 120 MG/30ML  Medications comment:  8 ml Lidocaine 1%  NDC: 7276-6598-59  LOT: [id number]  Exp: 7/1/23    Outcome:  Tolerated well, no immediate complications  Procedure discussed: discussed risks, benefits, and alternatives    Consent Given by:  Patient  Timeout: timeout called immediately prior to procedure    Prep: patient was prepped and draped in usual sterile fashion            Scar Dejesus MD  Dept. Orthopedic Surgery  [facility]       Disclaimer: This note consists of symbols derived from keyboarding, dictation and/or voice recognition software. As a result, there may be errors in the script that have gone undetected. Please consider this when interpreting information found in this chart.     chest pain/COUGH

## 2022-07-03 DIAGNOSIS — M62.830 BACK MUSCLE SPASM: ICD-10-CM

## 2022-07-05 RX ORDER — DIAZEPAM 5 MG
TABLET ORAL
Qty: 30 TABLET | Refills: 0 | Status: SHIPPED | OUTPATIENT
Start: 2022-07-05 | End: 2022-08-05

## 2022-07-05 NOTE — TELEPHONE ENCOUNTER
Spoke with Soco from [employer] for status update - it is pending [facility] review and we should have an outcome within the next day or two.

## 2022-07-05 NOTE — TELEPHONE ENCOUNTER
Prior Authorization Approval    Authorization Effective Date: 7/5/2022  Authorization Expiration Date: 7/1/2023  Medication: mirabegron (MYRBETRIQ) 50 MG 24 hr tablet - APPROVED  Insurance Company: [employer] ([facility]) - Phone [phone] Fax [phone]  Which Pharmacy is filling the prescription (Not needed for infusion/clinic administered): [id number] - [place], [place] - [address]  Pharmacy Notified: Yes  Patient Notified: Yes (pharmacy will notify patient when ready)

## 2022-07-07 ENCOUNTER — ANTICOAGULATION THERAPY VISIT (OUTPATIENT)
Dept: ANTICOAGULATION | Facility: CLINIC | Age: 67
End: 2022-07-07

## 2022-07-07 ENCOUNTER — LAB (OUTPATIENT)
Dept: LAB | Facility: CLINIC | Age: 67
End: 2022-07-07
Payer: COMMERCIAL

## 2022-07-07 DIAGNOSIS — I48.0 PAF (PAROXYSMAL ATRIAL FIBRILLATION) (H): Primary | ICD-10-CM

## 2022-07-07 DIAGNOSIS — Z95.2 AORTIC VALVE PROSTHESIS PRESENT: ICD-10-CM

## 2022-07-07 LAB — INR BLD: 2.7 (ref 0.9–1.1)

## 2022-07-07 PROCEDURE — 36416 COLLJ CAPILLARY BLOOD SPEC: CPT

## 2022-07-07 PROCEDURE — 85610 PROTHROMBIN TIME: CPT

## 2022-07-07 NOTE — PROGRESS NOTES
ANTICOAGULATION MANAGEMENT     Todd S Aschoff 66 year old male is on warfarin with therapeutic INR result. (Goal INR 2.5-3.5)    Recent labs: (last 7 days)     07/07/22  1146   INR 2.7*       ASSESSMENT       Source(s): Chart Review and Patient/Caregiver Call       Warfarin doses taken: Warfarin taken as instructed    Diet: No new diet changes identified    New illness, injury, or hospitalization: Yes: toe fracture 6/29/22    Medication/supplement changes: regular strength Tylenol TID for foot and chronic back pain. Tramadol also prescribed, but this did not help for pain control.    Signs or symptoms of bleeding or clotting: No    Previous INR: Therapeutic last 2(+) visits    Additional findings: None       PLAN     Recommended plan for no diet, medication or health factor changes affecting INR     Dosing Instructions: continue your current warfarin dose with next INR in 4 weeks       Summary  As of 7/7/2022    Full warfarin instructions:  5 mg every day   Next INR check:  8/4/2022             Telephone call with Nicko who agrees to plan and repeated back plan correctly    Lab visit scheduled    Education provided: Please call back if any changes to your diet, medications or how you've been taking warfarin    Plan made per [facility] anticoagulation protocol    Amanda Wheeler RN  Anticoagulation Clinic  7/7/2022    _______________________________________________________________________     Anticoagulation Episode Summary     Current INR goal:  2.5-3.5   TTR:  64.7 % (1 y)   Target end date:  Indefinite   Send INR reminders to:  AIDA GUERRERO    Indications    PAF (paroxysmal atrial fibrillation) (H) [I48.0]  Long term current use of anticoagulant therapy (Resolved) [Z79.01]  Mechanical AV Replacement 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

## 2022-07-25 DIAGNOSIS — I48.20 CHRONIC ATRIAL FIBRILLATION (H): ICD-10-CM

## 2022-07-25 RX ORDER — PROPAFENONE HYDROCHLORIDE 150 MG/1
150 TABLET, COATED ORAL EVERY 8 HOURS
Qty: 270 TABLET | Refills: 0 | Status: SHIPPED | OUTPATIENT
Start: 2022-07-25 | End: 2022-11-14

## 2022-08-04 ENCOUNTER — ANTICOAGULATION THERAPY VISIT (OUTPATIENT)
Dept: ANTICOAGULATION | Facility: CLINIC | Age: 67
End: 2022-08-04

## 2022-08-04 ENCOUNTER — LAB (OUTPATIENT)
Dept: LAB | Facility: CLINIC | Age: 67
End: 2022-08-04
Payer: COMMERCIAL

## 2022-08-04 DIAGNOSIS — I48.0 PAF (PAROXYSMAL ATRIAL FIBRILLATION) (H): Primary | ICD-10-CM

## 2022-08-04 DIAGNOSIS — Z95.2 AORTIC VALVE PROSTHESIS PRESENT: ICD-10-CM

## 2022-08-04 LAB — INR BLD: 2.5 (ref 0.9–1.1)

## 2022-08-04 PROCEDURE — 36416 COLLJ CAPILLARY BLOOD SPEC: CPT

## 2022-08-04 PROCEDURE — 85610 PROTHROMBIN TIME: CPT

## 2022-08-05 DIAGNOSIS — M62.830 BACK MUSCLE SPASM: ICD-10-CM

## 2022-08-05 RX ORDER — DIAZEPAM 5 MG
TABLET ORAL
Qty: 30 TABLET | Refills: 0 | Status: SHIPPED | OUTPATIENT
Start: 2022-08-05 | End: 2022-09-22

## 2022-08-05 NOTE — TELEPHONE ENCOUNTER
Routing refill request to provider for review/approval because:  Drug not on the [facility] refill protocol     Yadi Robert RN on 8/5/2022 at 9:34 AM

## 2022-09-02 ENCOUNTER — ANTICOAGULATION THERAPY VISIT (OUTPATIENT)
Dept: ANTICOAGULATION | Facility: CLINIC | Age: 67
End: 2022-09-02

## 2022-09-02 ENCOUNTER — OFFICE VISIT (OUTPATIENT)
Dept: PEDIATRICS | Facility: CLINIC | Age: 67
End: 2022-09-02
Payer: COMMERCIAL

## 2022-09-02 VITALS
TEMPERATURE: 98.7 F | BODY MASS INDEX: 39.61 KG/M2 | SYSTOLIC BLOOD PRESSURE: 151 MMHG | HEART RATE: 77 BPM | DIASTOLIC BLOOD PRESSURE: 94 MMHG | RESPIRATION RATE: 16 BRPM | OXYGEN SATURATION: 97 % | WEIGHT: 308.5 LBS

## 2022-09-02 DIAGNOSIS — M54.50 CHRONIC BILATERAL LOW BACK PAIN WITHOUT SCIATICA: ICD-10-CM

## 2022-09-02 DIAGNOSIS — I48.0 PAF (PAROXYSMAL ATRIAL FIBRILLATION) (H): Primary | ICD-10-CM

## 2022-09-02 DIAGNOSIS — G89.29 CHRONIC BILATERAL LOW BACK PAIN WITHOUT SCIATICA: ICD-10-CM

## 2022-09-02 DIAGNOSIS — J30.2 SEASONAL ALLERGIC RHINITIS, UNSPECIFIED TRIGGER: ICD-10-CM

## 2022-09-02 DIAGNOSIS — Z95.2 AORTIC VALVE PROSTHESIS PRESENT: ICD-10-CM

## 2022-09-02 DIAGNOSIS — S12.9XXA CLOSED FRACTURE OF TRANSVERSE PROCESS OF CERVICAL VERTEBRA, INITIAL ENCOUNTER (H): ICD-10-CM

## 2022-09-02 DIAGNOSIS — S30.1XXA TRAUMATIC HEMATOMA OF FLANK, INITIAL ENCOUNTER: ICD-10-CM

## 2022-09-02 DIAGNOSIS — Z79.899 ENCOUNTER FOR LONG-TERM (CURRENT) USE OF MEDICATIONS: ICD-10-CM

## 2022-09-02 DIAGNOSIS — Z11.4 SCREENING FOR HIV (HUMAN IMMUNODEFICIENCY VIRUS): ICD-10-CM

## 2022-09-02 DIAGNOSIS — R53.83 OTHER FATIGUE: Primary | ICD-10-CM

## 2022-09-02 DIAGNOSIS — S22.41XA CLOSED FRACTURE OF MULTIPLE RIBS OF RIGHT SIDE, INITIAL ENCOUNTER: ICD-10-CM
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
## 2022-09-02 PROBLEM — I95.9 HYPOTENSION, UNSPECIFIED HYPOTENSION TYPE: Status: RESOLVED | Noted: 2021-01-22 | Resolved: 2022-09-02

## 2022-09-02 PROBLEM — T81.31XA POSTOPERATIVE WOUND DEHISCENCE: Status: RESOLVED | Noted: 2022-04-22 | Resolved: 2022-09-02

## 2022-09-02 LAB
BASOPHILS # BLD AUTO: 0 10E3/UL (ref 0–0.2)
BASOPHILS NFR BLD AUTO: 0 %
EOSINOPHIL # BLD AUTO: 0.1 10E3/UL (ref 0–0.7)
EOSINOPHIL NFR BLD AUTO: 2 %
ERYTHROCYTE [DISTWIDTH] IN BLOOD BY AUTOMATED COUNT: 12.3 % (ref 10–15)
HCT VFR BLD AUTO: 49.6 % (ref 40–53)
HGB BLD-MCNC: 17.2 G/DL (ref 13.3–17.7)
INR BLD: 3 (ref 0.9–1.1)
LYMPHOCYTES # BLD AUTO: 1.7 10E3/UL (ref 0.8–5.3)
LYMPHOCYTES NFR BLD AUTO: 28 %
MCH RBC QN AUTO: 32.8 PG (ref 26.5–33)
MCHC RBC AUTO-ENTMCNC: 34.7 G/DL (ref 31.5–36.5)
MCV RBC AUTO: 95 FL (ref 78–100)
MONOCYTES # BLD AUTO: 0.7 10E3/UL (ref 0–1.3)
MONOCYTES NFR BLD AUTO: 11 %
NEUTROPHILS # BLD AUTO: 3.7 10E3/UL (ref 1.6–8.3)
NEUTROPHILS NFR BLD AUTO: 59 %
PLATELET # BLD AUTO: 247 10E3/UL (ref 150–450)
RBC # BLD AUTO: 5.25 10E6/UL (ref 4.4–5.9)
WBC # BLD AUTO: 6.3 10E3/UL (ref 4–11)

## 2022-09-02 PROCEDURE — 85610 PROTHROMBIN TIME: CPT | Performed by: INTERNAL MEDICINE

## 2022-09-02 PROCEDURE — 84439 ASSAY OF FREE THYROXINE: CPT | Performed by: INTERNAL MEDICINE

## 2022-09-02 PROCEDURE — 99214 OFFICE O/P EST MOD 30 MIN: CPT | Performed by: INTERNAL MEDICINE

## 2022-09-02 PROCEDURE — 84403 ASSAY OF TOTAL TESTOSTERONE: CPT | Performed by: INTERNAL MEDICINE

## 2022-09-02 PROCEDURE — 87389 HIV-1 AG W/HIV-1&-2 AB AG IA: CPT | Performed by: INTERNAL MEDICINE

## 2022-09-02 PROCEDURE — 36415 COLL VENOUS BLD VENIPUNCTURE: CPT | Performed by: INTERNAL MEDICINE

## 2022-09-02 PROCEDURE — 80050 GENERAL HEALTH PANEL: CPT | Performed by: INTERNAL MEDICINE

## 2022-09-02 PROCEDURE — 36416 COLLJ CAPILLARY BLOOD SPEC: CPT | Performed by: INTERNAL MEDICINE

## 2022-09-02 RX ORDER — PREGABALIN 100 MG/1
100 CAPSULE ORAL 3 TIMES DAILY
Qty: 270 CAPSULE | Refills: 3 | Status: SHIPPED | OUTPATIENT
Start: 2022-09-02 | End: 2023-09-14

## 2022-09-02 RX ORDER — LIDOCAINE 4 G/G
1 PATCH TOPICAL EVERY 24 HOURS
Qty: 30 PATCH | Refills: 5 | Status: SHIPPED | OUTPATIENT
Start: 2022-09-02 | End: 2022-12-12

## 2022-09-02 RX ORDER — AZELASTINE 1 MG/ML
1 SPRAY, METERED NASAL 2 TIMES DAILY
Qty: 30 ML | Refills: 5 | Status: SHIPPED | OUTPATIENT
Start: 2022-09-02 | End: 2023-09-14

## 2022-09-02 ASSESSMENT — PATIENT HEALTH QUESTIONNAIRE - PHQ9
10. IF YOU CHECKED OFF ANY PROBLEMS, HOW DIFFICULT HAVE THESE PROBLEMS MADE IT FOR YOU TO DO YOUR WORK, TAKE CARE OF THINGS AT HOME, OR GET ALONG WITH OTHER PEOPLE: EXTREMELY DIFFICULT
SUM OF ALL RESPONSES TO PHQ QUESTIONS 1-9: 4
SUM OF ALL RESPONSES TO PHQ QUESTIONS 1-9: 4

## 2022-09-02 ASSESSMENT — PAIN SCALES - GENERAL: PAINLEVEL: EXTREME PAIN (8)

## 2022-09-02 NOTE — PROGRESS NOTES
ANTICOAGULATION MANAGEMENT     Todd S Aschoff 66 year old male is on warfarin with therapeutic INR result. (Goal INR 2.5-3.5)    Recent labs: (last 7 days)     09/02/22  1506   INR 3.0*       ASSESSMENT       Source(s): Chart Review and Patient/Caregiver Call       Warfarin doses taken: Warfarin taken as instructed    Diet: No new diet changes identified    New illness, injury, or hospitalization: No    Medication/supplement changes: None noted    Signs or symptoms of bleeding or clotting: No    Previous INR: Therapeutic last 2(+) visits    Additional findings: None       PLAN     Recommended plan for no diet, medication or health factor changes affecting INR     Dosing Instructions: Continue your current warfarin dose with next INR in 5 weeks       Summary  As of 9/2/2022    Full warfarin instructions:  5 mg every day   Next INR check:  10/7/2022             Telephone call with Nicko who verbalizes understanding and agrees to plan    Lab visit scheduled    Education provided: Goal range and significance of current result and Importance of notifying clinic for changes in medications; a sooner lab recheck maybe needed.    Plan made per [facility] anticoagulation protocol    Payton Villarreal RN  Anticoagulation Clinic  9/2/2022    _______________________________________________________________________     Anticoagulation Episode Summary     Current INR goal:  2.5-3.5   TTR:  68.5 % (1 y)   Target end date:  Indefinite   Send INR reminders to:  AIDA GUERRERO    Indications    PAF (paroxysmal atrial fibrillation) (H) [I48.0]  Long term current use of anticoagulant therapy (Resolved) [Z79.01]  Mechanical AV Replacement 1992 -- on Warfarin  [Z95.2]           Comments:           Anticoagulation Care Providers     Provider Role Specialty Phone number    Obdulio Ingram MD Referring Internal Medicine [phone]    Herve Morgan MD Referring Internal Medicine - Pediatrics [phone]

## 2022-09-02 NOTE — LETTER
September 6, 2022      Todd S Aschoff  [address]  [place] [address]        Dear Mr.Aschoff,    We are writing to inform you of your test results.    Your other labs look essentially stable.  Your thyroid stimulating hormone (TSH) is trending a bit lower (with a higher thyroid stimulating hormone (TSH)), but we can recheck this in another 3-6 months when we see you again.  You can schedule this by calling  or using your [employer] account, if you have it.           Hope you're well.       Resulted Orders   TSH with free T4 reflex   Result Value Ref Range    TSH 5.10 (H) 0.40 - 4.00 mU/L   Comprehensive metabolic panel (BMP + Alb, Alk Phos, ALT, AST, Total. Bili, TP)   Result Value Ref Range    Sodium 137 133 - 144 mmol/L    Potassium 4.3 3.4 - 5.3 mmol/L    Chloride 101 94 - 109 mmol/L    Carbon Dioxide (CO2) 25 20 - 32 mmol/L    Anion Gap 11 3 - 14 mmol/L    Urea Nitrogen 19 7 - 30 mg/dL    Creatinine 0.94 0.66 - 1.25 mg/dL    Calcium 9.1 8.5 - 10.1 mg/dL    Glucose 91 70 - 99 mg/dL    Alkaline Phosphatase 81 40 - 150 U/L    AST 24 0 - 45 U/L    ALT 28 0 - 70 U/L    Protein Total 7.3 6.8 - 8.8 g/dL    Albumin 3.8 3.4 - 5.0 g/dL    Bilirubin Total 1.6 (H) 0.2 - 1.3 mg/dL    GFR Estimate 90 >60 mL/min/1.73m2      Comment:      Effective December 21, 2021 eGFRcr in adults is calculated using the 2021 CKD-EPI creatinine equation which includes age and gender (Ha montoya al., NEJM, DOI: 10.1056/CVWPao9611635)   CBC with platelets and differential   Result Value Ref Range    WBC Count 6.3 4.0 - 11.0 10e3/uL    RBC Count 5.25 4.40 - 5.90 10e6/uL    Hemoglobin 17.2 13.3 - 17.7 g/dL    Hematocrit 49.6 40.0 - 53.0 %    MCV 95 78 - 100 fL    MCH 32.8 26.5 - 33.0 pg    MCHC 34.7 31.5 - 36.5 g/dL    RDW 12.3 10.0 - 15.0 %    Platelet Count 247 150 - 450 10e3/uL    % Neutrophils 59 %    % Lymphocytes 28 %    % Monocytes 11 %    % Eosinophils 2 %    % Basophils 0 %    Absolute Neutrophils 3.7 1.6 - 8.3 10e3/uL     Absolute Lymphocytes 1.7 0.8 - 5.3 10e3/uL    Absolute Monocytes 0.7 0.0 - 1.3 10e3/uL    Absolute Eosinophils 0.1 0.0 - 0.7 10e3/uL    Absolute Basophils 0.0 0.0 - 0.2 10e3/uL   T4 free   Result Value Ref Range    Free T4 0.94 0.76 - 1.46 ng/dL       If you have any questions or concerns, please call the clinic at the number listed above.       Sincerely,      Herve Morgan MD

## 2022-09-02 NOTE — PROGRESS NOTES
"  Assessment & Plan     Screening for HIV (human immunodeficiency virus)    - HIV Antigen Antibody Combo; Future  - HIV Antigen Antibody Combo    Encounter for long-term (current) use of medications      Other fatigue  Patient Instructions   Get your flu shot and your COVID bivalent shot in 2 weeks.    Lab work today:  We can do labs in the exam room today.    Get your Prevnar 20 at any pharmacy.    Check with your neurologist about coming off the lamictal.  This will help your weight.     Your blood pressure is high.  Return to clinic in another 1 month for a blood pressure recheck.  If your blood pressure is still high, we should add in a blood pressure med.      - CBC with platelets and differential; Future  - TSH with free T4 reflex; Future  - Comprehensive metabolic panel (BMP + Alb, Alk Phos, ALT, AST, Total. Bili, TP); Future  - Testosterone, total; Future  - CBC with platelets and differential  - TSH with free T4 reflex  - Comprehensive metabolic panel (BMP + Alb, Alk Phos, ALT, AST, Total. Bili, TP)  - Testosterone, total    Chronic bilateral low back pain without sciatica  Refilled.  Adding back in his lldocaine.   - pregabalin (LYRICA) 100 MG capsule; Take 1 capsule (100 mg) by mouth 3 times daily Do not take if sleepy/sedated.    Seasonal allergic rhinitis, unspecified trigger    - azelastine (ASTELIN) 0.1 % nasal spray; Spray 1 spray into both nostrils 2 times daily               BMI:   Estimated body mass index is 39.61 kg/m  as calculated from the following:    Height as of 5/11/22: 1.88 m (6' 2\").    Weight as of this encounter: 139.9 kg (308 lb 8 oz).   Weight management plan: Patient was referred to their PCP to discuss a diet and exercise plan.    Patient Instructions   Get your flu shot and your COVID bivalent shot in 2 weeks.    Lab work today:  We can do labs in the exam room today.    Get your Prevnar 20 at any pharmacy.    Check with your neurologist about coming off the lamictal.  This will " help your weight.     Your blood pressure is high.  Return to clinic in another 1 month for a blood pressure recheck.  If your blood pressure is still high, we should add in a blood pressure med.       Return in about 3 months (around 12/2/2022) for medicine followup, with me, in person.    Herve Morgan MD  [facility] YOLANDA Maharaj is a 66 year old, presenting for the following health issues:  Consult      History of Present Illness       Reason for visit:  Sore throat and feel fatigue all the time  Symptom onset:  3-7 days ago  Symptom intensity:  Severe  Symptom progression:  Staying the same  Had these symptoms before:  No  What makes it worse:  No  What makes it better:  No    He eats 2-3 servings of fruits and vegetables daily.He consumes 0 sweetened beverage(s) daily.He exercises with enough effort to increase his heart rate 60 or more minutes per day.  He exercises with enough effort to increase his heart rate 7 days per week.   He is taking medications regularly.    Today's PHQ-9         PHQ-9 Total Score: 4    PHQ-9 Q9 Thoughts of better off dead/self-harm past 2 weeks :   Not at all    How difficult have these problems made it for you to do your work, take care of things at home, or get along with other people: Extremely difficult       Has been trying to lose weight.    Left shoulder injection not helped.    Has been feeling fatigued more than normal. 2 weeks now.  Back wound has healed up, but still has low back pain.  Can walk about 15 min then has to stop and rest and stretch his back.  No shortness of breath.  No tightness or chest pain.      Has some ongoing pain in his back , s/p his laminectomy.        Has sore throat as well/ thyroid area as well.           Review of Systems   CONSTITUTIONAL: NEGATIVE for fever, chills, change in weight  INTEGUMENTARY/SKIN: NEGATIVE for worrisome rashes, moles or lesions  EYES: NEGATIVE for vision changes or irritation  ENT/MOUTH:  NEGATIVE for ear, mouth and throat problems  RESP: NEGATIVE for significant cough or SOB  BREAST: NEGATIVE for masses, tenderness or discharge  CV: NEGATIVE for chest pain, palpitations or peripheral edema  GI: NEGATIVE for nausea, abdominal pain, heartburn, or change in bowel habits  : NEGATIVE for frequency, dysuria, or hematuria  MUSCULOSKELETAL: NEGATIVE for significant arthralgias or myalgia  NEURO: NEGATIVE for weakness, dizziness or paresthesias  ENDOCRINE: NEGATIVE for temperature intolerance, skin/hair changes  HEME: NEGATIVE for bleeding problems  PSYCHIATRIC: NEGATIVE for changes in mood or affect      Objective    BP (!) 151/94   Pulse 77   Temp 98.7  F (37.1  C) (Tympanic)   Resp 16   Wt 139.9 kg (308 lb 8 oz)   SpO2 97%   BMI 39.61 kg/m    Body mass index is 39.61 kg/m .  Physical Exam   GENERAL: healthy, alert and no distress  EYES: Eyes grossly normal to inspection, PERRL and conjunctivae and sclerae normal  HENT: ear canals and TM's normal, nose and mouth without ulcers or lesions  NECK: no adenopathy, no asymmetry, masses, or scars and thyroid normal to palpation  RESP: lungs clear to auscultation - no rales, rhonchi or wheezes  CV: mechanincal regular rate and rhythm, normal S1 S2, no S3 or S4, no murmur, click or rub, no peripheral edema and peripheral pulses strong  ABDOMEN: soft, nontender, no hepatosplenomegaly, no masses and bowel sounds normal  MS: no gross musculoskeletal defects noted, no edema  SKIN: no suspicious lesions or rashes  NEURO: Normal strength and tone, mentation intact and speech normal  PSYCH: mentation appears normal, affect normal/bright                    [unfilled]  @[facility]@

## 2022-09-02 NOTE — PATIENT INSTRUCTIONS
Get your flu shot and your COVID bivalent shot in 2 weeks.    Lab work today:  We can do labs in the exam room today.    Get your Prevnar 20 at any pharmacy.    Check with your neurologist about coming off the lamictal.  This will help your weight.     Your blood pressure is high.  Return to clinic in another 1 month for a blood pressure recheck.  If your blood pressure is still high, we should add in a blood pressure med.

## 2022-09-03 LAB
ALBUMIN SERPL-MCNC: 3.8 G/DL (ref 3.4–5)
ALP SERPL-CCNC: 81 U/L (ref 40–150)
ALT SERPL W P-5'-P-CCNC: 28 U/L (ref 0–70)
ANION GAP SERPL CALCULATED.3IONS-SCNC: 11 MMOL/L (ref 3–14)
AST SERPL W P-5'-P-CCNC: 24 U/L (ref 0–45)
BILIRUB SERPL-MCNC: 1.6 MG/DL (ref 0.2–1.3)
BUN SERPL-MCNC: 19 MG/DL (ref 7–30)
CALCIUM SERPL-MCNC: 9.1 MG/DL (ref 8.5–10.1)
CHLORIDE BLD-SCNC: 101 MMOL/L (ref 94–109)
CO2 SERPL-SCNC: 25 MMOL/L (ref 20–32)
CREAT SERPL-MCNC: 0.94 MG/DL (ref 0.66–1.25)
GFR SERPL CREATININE-BSD FRML MDRD: 90 ML/MIN/1.73M2
GLUCOSE BLD-MCNC: 91 MG/DL (ref 70–99)
POTASSIUM BLD-SCNC: 4.3 MMOL/L (ref 3.4–5.3)
PROT SERPL-MCNC: 7.3 G/DL (ref 6.8–8.8)
SODIUM SERPL-SCNC: 137 MMOL/L (ref 133–144)
T4 FREE SERPL-MCNC: 0.94 NG/DL (ref 0.76–1.46)
TSH SERPL DL<=0.005 MIU/L-ACNC: 5.1 MU/L (ref 0.4–4)

## 2022-09-05 LAB — HIV 1+2 AB+HIV1 P24 AG SERPL QL IA: NONREACTIVE

## 2022-09-07 LAB — TESTOST SERPL-MCNC: 256 NG/DL (ref 240–950)

## 2022-09-08 ENCOUNTER — TELEPHONE (OUTPATIENT)
Dept: PEDIATRICS | Facility: CLINIC | Age: 67
End: 2022-09-08

## 2022-09-08 NOTE — TELEPHONE ENCOUNTER
Patient left a voicemail on [facility] direct line.     Patient would like to have interpretation of lab results from 9/2/22.    Per chart review, Dr. Morgan reviewed these labs on 9/5/22.    Called the patient.     Reviewed the result notes from Dr. Morgan pertaining to the 9/2/22 labs with the patient.   Patient verbalized understanding.     Patient prefers to call back another time to schedule lab appt.     North Noel RN on 9/8/2022 at 8:51 AM

## 2022-09-20 ENCOUNTER — TELEPHONE (OUTPATIENT)
Dept: PEDIATRICS | Facility: CLINIC | Age: 67
End: 2022-09-20

## 2022-09-20 NOTE — TELEPHONE ENCOUNTER
Pt has some questions regarding his test results from  9-2-22. Please call pt at [phone].Nohemy Manning LPN

## 2022-09-21 NOTE — TELEPHONE ENCOUNTER
"Called patient to review labs.  \"Your other labs look essentially stable.  Your thyroid stimulating hormone (TSH) is trending a bit lower (with a higher thyroid stimulating hormone (TSH)), but we can recheck this in another 3-6 months when we see you again.  You can schedule this by calling  or using your [employer] account, if you have it.  Your testosterone looked within normal limits as well.\"    Looks like North has already reviewed the labs with patient after they were done, patient may have questions about thyroid labs and follow up.  Called patient: LM for patient to call me back.    Lynne Coto RN    "

## 2022-09-22 DIAGNOSIS — M62.830 BACK MUSCLE SPASM: ICD-10-CM

## 2022-09-22 RX ORDER — DIAZEPAM 5 MG
TABLET ORAL
Qty: 30 TABLET | Refills: 0 | Status: SHIPPED | OUTPATIENT
Start: 2022-09-22 | End: 2022-10-23

## 2022-09-26 ENCOUNTER — TELEPHONE (OUTPATIENT)
Dept: PEDIATRICS | Facility: CLINIC | Age: 67
End: 2022-09-26

## 2022-09-26 DIAGNOSIS — N52.9 ERECTILE DYSFUNCTION, UNSPECIFIED ERECTILE DYSFUNCTION TYPE: Primary | ICD-10-CM

## 2022-09-26 NOTE — TELEPHONE ENCOUNTER
This topic is addressed in another encounter.     Please see telephone encounter from 9/26/22.    North Noel RN on 9/26/2022 at 4:37 PM

## 2022-09-26 NOTE — TELEPHONE ENCOUNTER
Called the patient.    Patient has questions regarding testosterone level from 9/2/22 labs.     Patient is wondering if the testosterone being on the low side of normal could be a reason for the ongoing fatigue and erectile dysfunction.   Patient reports that he discussed this with Dr. Morgan during recent visit.     Patient would like to know if Dr. Morgan is willing to consider testosterone therapy, or suggest other treatment options.     Routing to Dr. Morgan:  -ok for testosterone therapy for t level on low side of normal range?  Any there other treatment suggestions for fatigue, and erectile dysfunction?    North Noel RN on 9/26/2022 at 3:56 PM

## 2022-09-26 NOTE — TELEPHONE ENCOUNTER
Received call from pt  He has many questions about his labs and his course of treatment    Routing to \[facility] [place] [facility]\"" to assist pt    Thank you  Devorah Nieves RN on 9/26/2022 at 2:57 PM

## 2022-09-26 NOTE — TELEPHONE ENCOUNTER
Please call.  His testosterone can be rechecked, with a FIRST AM blood draw for more accuracy.  Help him schedule.    If within normal limits, then he does not have low testosterone as a cause of his symptoms, and might benefit from viagra.    Herve Morgan MD  Internal Medicine and Pediatrics

## 2022-09-27 NOTE — PROGRESS NOTES
Care Management Discharge Note    Discharge Date: 05/19/21       Discharge Disposition: Home    Discharge Services: None    Discharge DME: None    Discharge Transportation: family or friend will provide    Private pay costs discussed: Not applicable    PAS Confirmation Code:    Patient/family educated on Medicare website which has current facility and service quality ratings: yes    Education Provided on the Discharge Plan:    Persons Notified of Discharge Plans: Pt  Patient/Family in Agreement with the Plan:      Handoff Referral Completed: No    Additional Information:  Heads up email sent to [facility] that patient was discharge with resumption of home RN.    Information added to AVS.  Previous Follow-up already made for PCP.  Bedside nurse to review AVS.         Mireya Davis RN       Detail Level: Detailed Add 62542 Cpt? (Important Note: In 2017 The Use Of 51197 Is Being Tracked By Cms To Determine Future Global Period Reimbursement For Global Periods): yes

## 2022-10-03 ENCOUNTER — ALLIED HEALTH/NURSE VISIT (OUTPATIENT)
Dept: PEDIATRICS | Facility: CLINIC | Age: 67
End: 2022-10-03
Payer: COMMERCIAL

## 2022-10-03 ENCOUNTER — ANTICOAGULATION THERAPY VISIT (OUTPATIENT)
Dept: ANTICOAGULATION | Facility: CLINIC | Age: 67
End: 2022-10-03

## 2022-10-03 ENCOUNTER — LAB (OUTPATIENT)
Dept: LAB | Facility: CLINIC | Age: 67
End: 2022-10-03

## 2022-10-03 VITALS — DIASTOLIC BLOOD PRESSURE: 84 MMHG | HEART RATE: 77 BPM | SYSTOLIC BLOOD PRESSURE: 136 MMHG

## 2022-10-03 DIAGNOSIS — I48.0 PAF (PAROXYSMAL ATRIAL FIBRILLATION) (H): Primary | ICD-10-CM

## 2022-10-03 DIAGNOSIS — Z95.2 AORTIC VALVE PROSTHESIS PRESENT: ICD-10-CM

## 2022-10-03 DIAGNOSIS — N52.9 ERECTILE DYSFUNCTION, UNSPECIFIED ERECTILE DYSFUNCTION TYPE: ICD-10-CM

## 2022-10-03 DIAGNOSIS — R03.0 ELEVATED BLOOD PRESSURE READING WITHOUT DIAGNOSIS OF HYPERTENSION: Primary | ICD-10-CM

## 2022-10-03 LAB — INR BLD: 2.5 (ref 0.9–1.1)

## 2022-10-03 PROCEDURE — 36415 COLL VENOUS BLD VENIPUNCTURE: CPT

## 2022-10-03 PROCEDURE — 36416 COLLJ CAPILLARY BLOOD SPEC: CPT

## 2022-10-03 PROCEDURE — 85610 PROTHROMBIN TIME: CPT

## 2022-10-03 PROCEDURE — 84403 ASSAY OF TOTAL TESTOSTERONE: CPT

## 2022-10-03 PROCEDURE — 99207 PR NO CHARGE NURSE ONLY: CPT

## 2022-10-03 NOTE — PROGRESS NOTES
Todd S Aschoff is a 66 year old patient who comes in today for a Blood Pressure check.  Initial BP:  /84 (BP Location: Right arm, Patient Position: Sitting, Cuff Size: Adult Large)   Pulse 77      Data Unavailable  Disposition: results routed to provider

## 2022-10-03 NOTE — PROGRESS NOTES
ANTICOAGULATION MANAGEMENT     Todd S Aschoff 66 year old male is on warfarin with therapeutic INR result. (Goal INR 2.5-3.5)    Recent labs: (last 7 days)     10/03/22  1059   INR 2.5*       ASSESSMENT       Source(s): Chart Review and Patient/Caregiver Call       Warfarin doses taken: Warfarin taken as instructed    Diet: Increased greens/vitamin K in diet; plans to resume previous intake    New illness, injury, or hospitalization: No    Medication/supplement changes: None noted    Signs or symptoms of bleeding or clotting: No    Previous INR: Therapeutic last 2(+) visits    Additional findings: None       PLAN     Recommended plan for temporary change(s) affecting INR     Dosing Instructions: Continue your current warfarin dose with next INR in 5 weeks       Summary  As of 10/3/2022    Full warfarin instructions:  5 mg every day   Next INR check:  11/7/2022             Telephone call with Nicko who verbalizes understanding and agrees to plan    Lab visit scheduled    Education provided: Please call back if any changes to your diet, medications or how you've been taking warfarin and Contact [phone]  with any changes, questions or concerns.     Plan made per [facility] anticoagulation protocol    Lucina Andrew RN  Anticoagulation Clinic  10/3/2022    _______________________________________________________________________     Anticoagulation Episode Summary     Current INR goal:  2.5-3.5   TTR:  70.7 % (1 y)   Target end date:  Indefinite   Send INR reminders to:  [facility]    Indications    PAF (paroxysmal atrial fibrillation) (H) [I48.0]  Long term current use of anticoagulant therapy (Resolved) [Z79.01]  Mechanical AV Replacement 1992 -- on Warfarin  [Z95.2]           Comments:           Anticoagulation Care Providers     Provider Role Specialty Phone number    Obdulio Ingram MD Referring Internal Medicine [phone]    Herve Morgan MD Referring Internal Medicine - Pediatrics [phone]

## 2022-10-05 LAB — TESTOST SERPL-MCNC: 247 NG/DL (ref 240–950)

## 2022-10-12 NOTE — PROGRESS NOTES
ANTICOAGULATION FOLLOW-UP CLINIC VISIT    Patient Name:  Todd S Aschoff  Date:  10/11/2017  Contact Type:  Face to Face    SUBJECTIVE:     Patient Findings     Positives Change in diet/appetite (pt has not been eating well and has lost weight.), Antibiotic use or infection (recently treated for Shingles.  Starting to feel better now.)           OBJECTIVE    INR Protime   Date Value Ref Range Status   10/11/2017 2.2 (A) 0.86 - 1.14 Final       ASSESSMENT / PLAN  INR assessment SUB    Recheck INR In: 2 WEEKS    INR Location Clinic      Anticoagulation Summary as of 10/11/2017     INR goal 2.5-3.5   Today's INR 2.2!   Maintenance plan 5 mg (5 mg x 1) every day   Full instructions 10/11: 7.5 mg; 10/12: 7.5 mg; Otherwise 5 mg every day   Weekly total 35 mg   Plan last modified Amanda Wheeler RN (7/19/2017)   Next INR check 10/25/2017   Priority INR   Target end date     Indications   Long-term (current) use of anticoagulants [Z79.01] [Z79.01]  Aortic valve prosthesis present [Z95.2]  Atrial fibrillation (H) [I48.91]         Anticoagulation Episode Summary     INR check location     Preferred lab     Send INR reminders to [facility]    Comments       Anticoagulation Care Providers     Provider Role Specialty Phone number    Obdulio Ingram MD [facility] [phone]            See the Encounter Report to view Anticoagulation Flowsheet and Dosing Calendar (Go to Encounters tab in chart review, and find the Anticoagulation Therapy Visit)    Dosage adjustment made based on physician directed care plan.    Amanda Wheeler RN                36.6

## 2022-10-16 ENCOUNTER — HEALTH MAINTENANCE LETTER (OUTPATIENT)
Age: 67
End: 2022-10-16

## 2022-10-22 DIAGNOSIS — M62.830 BACK MUSCLE SPASM: ICD-10-CM

## 2022-10-23 RX ORDER — DIAZEPAM 5 MG
TABLET ORAL
Qty: 30 TABLET | Refills: 0 | Status: SHIPPED | OUTPATIENT
Start: 2022-10-23 | End: 2022-11-22

## 2022-11-07 ENCOUNTER — LAB (OUTPATIENT)
Dept: LAB | Facility: CLINIC | Age: 67
End: 2022-11-07
Payer: COMMERCIAL

## 2022-11-07 ENCOUNTER — ANTICOAGULATION THERAPY VISIT (OUTPATIENT)
Dept: ANTICOAGULATION | Facility: CLINIC | Age: 67
End: 2022-11-07

## 2022-11-07 DIAGNOSIS — Z95.2 AORTIC VALVE PROSTHESIS PRESENT: ICD-10-CM

## 2022-11-07 DIAGNOSIS — I48.0 PAF (PAROXYSMAL ATRIAL FIBRILLATION) (H): Primary | ICD-10-CM

## 2022-11-07 LAB — INR BLD: 2.2 (ref 0.9–1.1)

## 2022-11-07 PROCEDURE — 85610 PROTHROMBIN TIME: CPT

## 2022-11-07 PROCEDURE — 36416 COLLJ CAPILLARY BLOOD SPEC: CPT

## 2022-11-07 NOTE — PROGRESS NOTES
ANTICOAGULATION MANAGEMENT     Todd S Aschoff 66 year old male is on warfarin with subtherapeutic INR result. (Goal INR 2.5-3.5)    Recent labs: (last 7 days)     11/07/22  1200   INR 2.2*       ASSESSMENT       Source(s): Chart Review    Previous INR was Therapeutic last 2(+) visits    Medication, diet, health changes since last INR chart reviewed; none identified           PLAN     Unable to reach Nicko aquino today.    Left message to continue current dose of warfarin 5 mg tonight. Request call back for assessment.    Follow up required to confirm warfarin dose taken and assess for changes and discuss out of range result     Marianne Andrew RN  Anticoagulation Clinic  11/7/2022

## 2022-11-08 NOTE — PROGRESS NOTES
ANTICOAGULATION MANAGEMENT     Todd S Aschoff 66 year old male is on warfarin with subtherapeutic INR result. (Goal INR 2.5-3.5)    Recent labs: (last 7 days)     11/07/22  1200   INR 2.2*       ASSESSMENT       Source(s): Chart Review and Patient/Caregiver Call       Warfarin doses taken: Warfarin taken as instructed    Diet: Increased greens/vitamin K in diet; plans to resume previous intake    New illness, injury, or hospitalization: Reports coughing up phlegm    Medication/supplement changes: None noted    Signs or symptoms of bleeding or clotting: No    Previous INR: Therapeutic last 2(+) visits    Additional findings: None       PLAN     Recommended plan for temporary change(s) affecting INR     Dosing Instructions: Continue your current warfarin dose with next INR in 2 weeks       Summary  As of 11/7/2022    Full warfarin instructions:  5 mg every day; Starting 11/7/2022   Next INR check:  11/21/2022             Telephone call with Nicko who verbalizes understanding and agrees to plan    Lab visit scheduled    Education provided:     Please call back if any changes to your diet, medications or how you've been taking warfarin    Dietary considerations: impact of vitamin K foods on INR    Plan made per [facility] anticoagulation protocol    Marianne Andrew RN  Anticoagulation Clinic  11/8/2022    _______________________________________________________________________     Anticoagulation Episode Summary     Current INR goal:  2.5-3.5   TTR:  61.1 % (1 y)   Target end date:  Indefinite   Send INR reminders to:  AIDA GUERRERO    Indications    PAF (paroxysmal atrial fibrillation) (H) [I48.0]  Long term current use of anticoagulant therapy (Resolved) [Z79.01]  Mechanical AV Replacement 1992 -- on Warfarin  [Z95.2]           Comments:           Anticoagulation Care Providers     Provider Role Specialty Phone number    Obudlio Ingram MD Referring Internal Medicine [phone]    Herve Morgan MD Referring  Internal Medicine - Pediatrics [phone]

## 2022-11-08 NOTE — PROGRESS NOTES
Attempted to reach Nicko again today. Also left a voicemail for Lissy. Closing encounter per protocol after 3 attempts. Continue maintenance dose and recheck INR in 1-2 weeks.

## 2022-11-14 DIAGNOSIS — I48.20 CHRONIC ATRIAL FIBRILLATION (H): ICD-10-CM

## 2022-11-14 RX ORDER — PROPAFENONE HYDROCHLORIDE 150 MG/1
150 TABLET, COATED ORAL EVERY 8 HOURS
Qty: 270 TABLET | Refills: 0 | Status: SHIPPED | OUTPATIENT
Start: 2022-11-14 | End: 2023-02-28

## 2022-11-21 ENCOUNTER — LAB (OUTPATIENT)
Dept: LAB | Facility: CLINIC | Age: 67
End: 2022-11-21
Payer: COMMERCIAL

## 2022-11-21 ENCOUNTER — ANTICOAGULATION THERAPY VISIT (OUTPATIENT)
Dept: ANTICOAGULATION | Facility: CLINIC | Age: 67
End: 2022-11-21

## 2022-11-21 DIAGNOSIS — I48.0 PAF (PAROXYSMAL ATRIAL FIBRILLATION) (H): Primary | ICD-10-CM

## 2022-11-21 DIAGNOSIS — Z95.2 AORTIC VALVE PROSTHESIS PRESENT: ICD-10-CM

## 2022-11-21 LAB — INR BLD: 2.7 (ref 0.9–1.1)

## 2022-11-21 PROCEDURE — 36415 COLL VENOUS BLD VENIPUNCTURE: CPT

## 2022-11-21 PROCEDURE — 85610 PROTHROMBIN TIME: CPT

## 2022-11-21 NOTE — PROGRESS NOTES
ANTICOAGULATION MANAGEMENT     Todd S Aschoff 66 year old male is on warfarin with therapeutic INR result. (Goal INR 2.5-3.5)    Recent labs: (last 7 days)     11/21/22  1111   INR 2.7*       ASSESSMENT       Source(s): Chart Review and Patient/Caregiver Call       Warfarin doses taken: Warfarin taken as instructed    Diet: No new diet changes identified    New illness, injury, or hospitalization: No    Medication/supplement changes: None noted    Signs or symptoms of bleeding or clotting: No    Previous INR: Subtherapeutic    Additional findings: None       PLAN     Recommended plan for no diet, medication or health factor changes affecting INR     Dosing Instructions: Continue your current warfarin dose with next INR in 3 weeks       Summary  As of 11/21/2022    Full warfarin instructions:  5 mg every day; Starting 11/21/2022   Next INR check:  12/12/2022             Telephone call with Nicko who verbalizes understanding and agrees to plan    Lab visit scheduled    Education provided:     Dietary considerations: importance of consistent vitamin K intake    Plan made per [facility] anticoagulation protocol    Angely Smalls RN  Anticoagulation Clinic  11/21/2022    _______________________________________________________________________     Anticoagulation Episode Summary     Current INR goal:  2.5-3.5   TTR:  61.0 % (1 y)   Target end date:  Indefinite   Send INR reminders to:  [facility]    Indications    PAF (paroxysmal atrial fibrillation) (H) [I48.0]  Long term current use of anticoagulant therapy (Resolved) [Z79.01]  Mechanical AV Replacement 1992 -- on Warfarin  [Z95.2]           Comments:           Anticoagulation Care Providers     Provider Role Specialty Phone number    Obdulio Ingram MD Referring Internal Medicine [phone]    Herve Morgan MD Referring Internal Medicine - Pediatrics [phone]

## 2022-11-22 DIAGNOSIS — M62.830 BACK MUSCLE SPASM: ICD-10-CM

## 2022-11-22 RX ORDER — DIAZEPAM 5 MG
TABLET ORAL
Qty: 30 TABLET | Refills: 0 | Status: SHIPPED | OUTPATIENT
Start: 2022-11-22 | End: 2022-12-23

## 2022-12-02 DIAGNOSIS — I48.0 PAF (PAROXYSMAL ATRIAL FIBRILLATION) (H): Primary | ICD-10-CM

## 2022-12-04 DIAGNOSIS — F33.42 MAJOR DEPRESSIVE DISORDER, RECURRENT EPISODE, IN FULL REMISSION (H): ICD-10-CM

## 2022-12-06 RX ORDER — LIOTHYRONINE SODIUM 25 UG/1
TABLET ORAL
Qty: 90 TABLET | Refills: 0 | OUTPATIENT
Start: 2022-12-06

## 2022-12-06 NOTE — TELEPHONE ENCOUNTER
Routing refill request to provider for review/approval because:  Thyroid Protocol Failed 12/04/2022 08:29 AM   Protocol Details  Medication is active on med list    Normal TSH on file in past 12 months     TSH   Date Value Ref Range Status   09/02/2022 5.10 (H) 0.40 - 4.00 mU/L Final   11/30/2020 2.17 0.40 - 4.00 mU/L Final       Rosa Elena Duran RN

## 2022-12-06 NOTE — TELEPHONE ENCOUNTER
Called patient and he confirmed that this was discontinued a while ago.  Has not been taking it.  He has let the pharmacy know as well a while back but they must have not removed it off the list.  We have refused it again so the pharmacy should be aware.  Lynne Coto RN

## 2022-12-06 NOTE — TELEPHONE ENCOUNTER
I believe this has been discontinued.  I usually do no prescribe cytomel.  Please inquire with patient .

## 2022-12-12 ENCOUNTER — ANCILLARY PROCEDURE (OUTPATIENT)
Dept: GENERAL RADIOLOGY | Facility: CLINIC | Age: 67
End: 2022-12-12
Attending: INTERNAL MEDICINE
Payer: COMMERCIAL

## 2022-12-12 ENCOUNTER — OFFICE VISIT (OUTPATIENT)
Dept: PEDIATRICS | Facility: CLINIC | Age: 67
End: 2022-12-12
Payer: COMMERCIAL

## 2022-12-12 ENCOUNTER — ANTICOAGULATION THERAPY VISIT (OUTPATIENT)
Dept: ANTICOAGULATION | Facility: CLINIC | Age: 67
End: 2022-12-12

## 2022-12-12 VITALS
WEIGHT: 315 LBS | OXYGEN SATURATION: 99 % | BODY MASS INDEX: 40.43 KG/M2 | SYSTOLIC BLOOD PRESSURE: 138 MMHG | HEART RATE: 70 BPM | DIASTOLIC BLOOD PRESSURE: 86 MMHG | HEIGHT: 74 IN | TEMPERATURE: 97.3 F | RESPIRATION RATE: 18 BRPM

## 2022-12-12 DIAGNOSIS — E03.9 ACQUIRED HYPOTHYROIDISM: ICD-10-CM

## 2022-12-12 DIAGNOSIS — S99.911S INJURY OF RIGHT ANKLE, SEQUELA: Primary | ICD-10-CM

## 2022-12-12 DIAGNOSIS — Z95.2 AORTIC VALVE PROSTHESIS PRESENT: ICD-10-CM

## 2022-12-12 DIAGNOSIS — I48.20 CHRONIC ATRIAL FIBRILLATION (H): ICD-10-CM

## 2022-12-12 DIAGNOSIS — I10 ESSENTIAL HYPERTENSION: ICD-10-CM

## 2022-12-12 DIAGNOSIS — I48.0 PAF (PAROXYSMAL ATRIAL FIBRILLATION) (H): ICD-10-CM

## 2022-12-12 DIAGNOSIS — I48.0 PAF (PAROXYSMAL ATRIAL FIBRILLATION) (H): Primary | ICD-10-CM

## 2022-12-12 DIAGNOSIS — F10.21 ALCOHOL DEPENDENCE IN REMISSION (H): ICD-10-CM

## 2022-12-12 DIAGNOSIS — S99.911S INJURY OF RIGHT ANKLE, SEQUELA: ICD-10-CM

## 2022-12-12 DIAGNOSIS — G40.909 NONINTRACTABLE EPILEPSY WITHOUT STATUS EPILEPTICUS, UNSPECIFIED EPILEPSY TYPE (H): ICD-10-CM

## 2022-12-12 DIAGNOSIS — E66.01 MORBID OBESITY, UNSPECIFIED OBESITY TYPE (H): ICD-10-CM

## 2022-12-12 DIAGNOSIS — F33.1 MAJOR DEPRESSIVE DISORDER, RECURRENT EPISODE, MODERATE (H): ICD-10-CM

## 2022-12-12 DIAGNOSIS — Z79.899 ENCOUNTER FOR LONG-TERM (CURRENT) USE OF MEDICATIONS: ICD-10-CM

## 2022-12-12 LAB
INR BLD: 3.4 (ref 0.9–1.1)
T4 FREE SERPL-MCNC: 0.84 NG/DL (ref 0.9–1.7)
TSH SERPL DL<=0.005 MIU/L-ACNC: 6.21 UIU/ML (ref 0.3–4.2)

## 2022-12-12 PROCEDURE — 84443 ASSAY THYROID STIM HORMONE: CPT | Performed by: INTERNAL MEDICINE

## 2022-12-12 PROCEDURE — 85610 PROTHROMBIN TIME: CPT | Performed by: INTERNAL MEDICINE

## 2022-12-12 PROCEDURE — 73660 X-RAY EXAM OF TOE(S): CPT | Mod: TC | Performed by: RADIOLOGY

## 2022-12-12 PROCEDURE — 90677 PCV20 VACCINE IM: CPT | Performed by: INTERNAL MEDICINE

## 2022-12-12 PROCEDURE — 90472 IMMUNIZATION ADMIN EACH ADD: CPT | Performed by: INTERNAL MEDICINE

## 2022-12-12 PROCEDURE — 99214 OFFICE O/P EST MOD 30 MIN: CPT | Mod: 25 | Performed by: INTERNAL MEDICINE

## 2022-12-12 PROCEDURE — 73610 X-RAY EXAM OF ANKLE: CPT | Mod: TC | Performed by: RADIOLOGY

## 2022-12-12 PROCEDURE — 90662 IIV NO PRSV INCREASED AG IM: CPT | Performed by: INTERNAL MEDICINE

## 2022-12-12 PROCEDURE — 90471 IMMUNIZATION ADMIN: CPT | Performed by: INTERNAL MEDICINE

## 2022-12-12 PROCEDURE — 36415 COLL VENOUS BLD VENIPUNCTURE: CPT | Performed by: INTERNAL MEDICINE

## 2022-12-12 PROCEDURE — 84439 ASSAY OF FREE THYROXINE: CPT | Performed by: INTERNAL MEDICINE

## 2022-12-12 PROCEDURE — 0134A COVID-19 VACCINE BIVALENT BOOSTER 18+ (MODERNA): CPT | Performed by: INTERNAL MEDICINE

## 2022-12-12 PROCEDURE — 91313 COVID-19 VACCINE BIVALENT BOOSTER 18+ (MODERNA): CPT | Performed by: INTERNAL MEDICINE

## 2022-12-12 RX ORDER — WARFARIN SODIUM 5 MG/1
TABLET ORAL
Qty: 90 TABLET | Refills: 3 | Status: SHIPPED | OUTPATIENT
Start: 2022-12-12 | End: 2023-10-31

## 2022-12-12 RX ORDER — LISINOPRIL 10 MG/1
10 TABLET ORAL DAILY
Qty: 90 TABLET | Refills: 3 | Status: SHIPPED | OUTPATIENT
Start: 2022-12-12 | End: 2023-09-15

## 2022-12-12 ASSESSMENT — PAIN SCALES - GENERAL: PAINLEVEL: EXTREME PAIN (8)

## 2022-12-12 NOTE — PROGRESS NOTES
Assessment & Plan     Encounter for long-term (current) use of medications      Aortic valve prosthesis present  On warfarin.  Has INR appointment today.   - warfarin ANTICOAGULANT (COUMADIN) 5 MG tablet; Take one tablet  by mouth daily or as directed by INR Clinic.    Chronic atrial fibrillation (H)  On warfarin.  INR today.   - warfarin ANTICOAGULANT (COUMADIN) 5 MG tablet; Take one tablet  by mouth daily or as directed by INR Clinic.    Nonintractable epilepsy without status epilepticus, unspecified epilepsy type (H)  Management per neurology.    Now is off lamictal.     Alcohol dependence in remission (H)  Currently not drinking.     Major depressive disorder, recurrent episode, moderate (H)  Remains on effexor.  Symptoms stable.     Morbid obesity, unspecified obesity type (H)  Making his hypertension more diff to treat.     Essential hypertension  Complicated by his obesity.  Begin ACE inhibitor and follow up in 1-3 months.   - lisinopril (ZESTRIL) 10 MG tablet; Take 1 tablet (10 mg) by mouth daily    Acquired hypothyroidism  Labs repeat today; borderline low 3 months ago.   - TSH with free T4 reflex; Future    Injury of right ankle, sequela  No evidence of fracture.  Ankle exercises given.   - XR Ankle Right G/E 3 Views; Future  - XR Toe Right G/E 2 Views; Future             See Patient Instructions    No follow-ups on file.    Herve Morgan MD  [facility] YOLANDA Maharaj is a 66 year old, presenting for the following health issues:  Hypertension      History of Present Illness       Hypertension: He presents for follow up of hypertension.  He does not check blood pressure  regularly outside of the clinic. Outside blood pressures have been over 140/90. He follows a low salt diet.         Hypertension Follow-up      Do you check your blood pressure regularly outside of the clinic? No     Are you following a low salt diet? No    Are your blood pressures ever more than 140 on the top  "number (systolic) OR more   than 90 on the bottom number (diastolic), for example 140/90? Yes    Fall downstairs lat week hurt R foot.  Was carrying a box; hyper dorsiflexed his ankle; this was 5 days ago.      Wife with recent MI.     Due for shots.    blood pressure still borderline high.           Review of Systems   CONSTITUTIONAL: NEGATIVE for fever, chills, change in weight  INTEGUMENTARY/SKIN: NEGATIVE for worrisome rashes, moles or lesions  EYES: NEGATIVE for vision changes or irritation  ENT/MOUTH: NEGATIVE for ear, mouth and throat problems  RESP: NEGATIVE for significant cough or SOB  BREAST: NEGATIVE for masses, tenderness or discharge  CV: NEGATIVE for chest pain, palpitations or peripheral edema  GI: NEGATIVE for nausea, abdominal pain, heartburn, or change in bowel habits  : NEGATIVE for frequency, dysuria, or hematuria  MUSCULOSKELETAL: NEGATIVE for significant arthralgias or myalgia  NEURO: NEGATIVE for weakness, dizziness or paresthesias  ENDOCRINE: NEGATIVE for temperature intolerance, skin/hair changes  HEME: NEGATIVE for bleeding problems  PSYCHIATRIC: NEGATIVE for changes in mood or affect      Objective    /86   Pulse 70   Temp 97.3  F (36.3  C) (Tympanic)   Resp 18   Ht 1.88 m (6' 2.02\")   Wt 143 kg (315 lb 3.2 oz)   SpO2 99%   BMI 40.45 kg/m    Body mass index is 40.45 kg/m .  Physical Exam   GENERAL: healthy, alert and no distress  EYES: Eyes grossly normal to inspection, PERRL and conjunctivae and sclerae normal  HENT: ear canals and TM's normal, nose and mouth without ulcers or lesions  NECK: no adenopathy, no asymmetry, masses, or scars and thyroid normal to palpation  RESP: lungs clear to auscultation - no rales, rhonchi or wheezes  CV: regular rate and rhythm, normal S1 S2, no S3 or S4, no murmur, click or rub, no peripheral edema and peripheral pulses strong  ABDOMEN: soft, nontender, no hepatosplenomegaly, no masses and bowel sounds normal  MS: no gross musculoskeletal " defects noted, no edema  SKIN: no suspicious lesions or rashes  NEURO: Normal strength and tone, mentation intact and speech normal  PSYCH: mentation appears normal, affect normal/bright

## 2022-12-12 NOTE — PROGRESS NOTES
ANTICOAGULATION MANAGEMENT     Todd S Aschoff 66 year old male is on warfarin with therapeutic INR result. (Goal INR 2.5-3.5)    Recent labs: (last 7 days)     12/12/22  1458   INR 3.4*       ASSESSMENT       Source(s): Chart Review    Previous INR was Therapeutic last visit; previously outside of goal range    Medication, diet, health changes since last INR    Patient seen today by PCP    Patient reported a fall about 5 days ago with an ankle injury, per office visit note, no fractures, ice ankle 3 times a day for 15 minutes    Lisinopril started, no interaction anticipated.           PLAN     Unable to reach Nicko today.    No instructions provided. Unable to leave voicemail. Voicemail full.    Follow up required to confirm warfarin dose taken and assess for changes    Lucina Andrew RN  Anticoagulation Clinic  12/12/2022

## 2022-12-12 NOTE — LETTER
December 14, 2022      Todd S Aschoff  [address]  [place] [address]        Dear Mr.Aschoff,    We are writing to inform you of your test results.    Your thyroid levels are low, with a high thyroid stimulating hormone (TSH); we should increase your dose to 175 mcg instead of 150 (I've sent this in), and have you recheck levels in 3 months; You can schedule this by calling  or using your [employer] account, if you have it.        Resulted Orders   TSH with free T4 reflex   Result Value Ref Range    TSH 6.21 (H) 0.30 - 4.20 uIU/mL   T4 free   Result Value Ref Range    Free T4 0.84 (L) 0.90 - 1.70 ng/dL       If you have any questions or concerns, please call the clinic at the number listed above.       Sincerely,      Herve Morgan MD

## 2022-12-12 NOTE — PROGRESS NOTES
Attempted to call patient, no answer, voicemail full.  Will try again later.  Lucina Andrew RN, BSN  Anticoagulation Clinic

## 2022-12-12 NOTE — PATIENT INSTRUCTIONS
Xray showed no fracture.  Use cold packs three times daily for 15 min, and begin with     3 shots.      Blood work.    At your visit today, we discussed your risk for falls and preventive options.    Let's begin lisinopril 10 mg and have you follow up with us in 1-3 months.          Fall Prevention  Falls often occur due to slipping, tripping or losing your balance. Millions of people fall every year and injure themselves. Here are ways to reduce your risk of falling again.   Think about your fall, was there anything that caused your fall that can be fixed, removed, or replaced?  Make your home safe by keeping walkways clear of objects you may trip over, such as electric cords.  Use non-slip pads under rugs. Don't use area rugs or small throw rugs.  Use non-slip mats in bathtubs and showers.  Install handrails and lights on staircases. The handrails should be on both sides of the stairs.  Don't walk in poorly lit areas.  Don't stand on chairs or wobbly ladders.  Use caution when reaching overhead or looking upward. This position can cause a loss of balance.  Be sure your shoes fit properly, have non-slip bottoms and are in good condition.   Wear shoes both inside and out. Don't go barefoot or wear slippers.  Be cautious when going up and down stairs, curbs, and when walking on uneven sidewalks.  If your balance is poor, consider using a cane or walker.  If your fall was related to alcohol use, stop or limit alcohol intake.   If your fall was related to use of sleeping medicines, talk to your healthcare provider about this. You may need to reduce your dosage at bedtime if you awaken during the night to go to the bathroom.    To reduce the need for nighttime bathroom trips:  Don't drink fluids for several hours before going to bed  Empty your bladder before going to bed  Men can keep a urinal at the bedside  Stay as active as you can. Balance, flexibility, strength, and endurance all come from exercise. They all  play a role in preventing falls. Ask your healthcare provider which types of activity are right for you.  Get your vision checked on a regular basis.  If you have pets, know where they are before you stand up or walk so you don't trip over them.  Use night lights.  Go over all your medicines with a pharmacist or other healthcare provider to see if any of them could make you more likely to fall.  [employer] last reviewed this educational content on 4/1/2018 2000-2021 [employer]. All rights reserved. This information is not intended as a substitute for professional medical care. Always follow your healthcare professional's instructions.

## 2022-12-13 RX ORDER — LEVOTHYROXINE SODIUM 175 UG/1
175 TABLET ORAL DAILY
Qty: 90 TABLET | Refills: 3 | Status: SHIPPED | OUTPATIENT
Start: 2022-12-13 | End: 2023-08-15

## 2022-12-13 NOTE — PROGRESS NOTES
Attempted to call patient, no answer, voicemail full.  Lucina Andrew, RN, BSN  Anticoagulation Clinic

## 2022-12-13 NOTE — PATIENT INSTRUCTIONS
Leticia Maharaj,  I have tried several times to reach you. I was unable to leave a message as your voicemail is full.    Please continue to take 5 mg of warfarin daily.    You will be due for an INR check 1/9/23, please call [phone] to schedule this appointment.    If you have had any changes, have questions or concerns, please call [phone] and ask to speak to the INR Nurse.    Lucina RN, BSN  Anticoagulation Clinic  [phone]

## 2022-12-13 NOTE — PROGRESS NOTES
ANTICOAGULATION MANAGEMENT     Todd S Aschoff 66 year old male is on warfarin with therapeutic INR result. (Goal INR 2.5-3.5)    Recent labs: (last 7 days)     12/12/22  1458   INR 3.4*     See below for assessment     PLAN     Recommended plan for no diet, medication or health factor changes affecting INR     Dosing Instructions: Continue your current warfarin dose with next INR in 4 weeks       Summary  As of 12/12/2022    Full warfarin instructions:  5 mg every day; Starting 12/12/2022   Next INR check:  1/9/2023             Have made 4 attempts to reach patient, no answer and voicemail is full. AVS mailed to the patient    Contact [phone]  to schedule and with any changes, questions or concerns.     Education provided:     Please call back if any changes to your diet, medications or how you've been taking warfarin    Contact [phone]  with any changes, questions or concerns.     Plan made per [facility] anticoagulation protocol    Lucina Andrew RN  Anticoagulation Clinic  12/13/2022    _______________________________________________________________________     Anticoagulation Episode Summary     Current INR goal:  2.5-3.5   TTR:  66.2 % (1 y)   Target end date:  Indefinite   Send INR reminders to:  [facility] [place]    Indications    PAF (paroxysmal atrial fibrillation) (H) [I48.0]  Long term current use of anticoagulant therapy (Resolved) [Z79.01]  Mechanical AV Replacement 1992 -- on Warfarin  [Z95.2]           Comments:           Anticoagulation Care Providers     Provider Role Specialty Phone number    Obdulio Ingram MD Referring Internal Medicine [phone]    Herve Morgan MD Referring Internal Medicine - Pediatrics [phone]

## 2022-12-14 ENCOUNTER — TELEPHONE (OUTPATIENT)
Dept: PEDIATRICS | Facility: CLINIC | Age: 67
End: 2022-12-14

## 2022-12-14 NOTE — TELEPHONE ENCOUNTER
Called and spoke with patient.  He reports that his ankle symptoms are slowly improving.  No other changes noted.  Advised him to continue on his maintenance dose of warfarin and recheck INR in 4 weeks as previously planned.  Assisted to schedule appointment.  Patient denied further questions.

## 2022-12-14 NOTE — TELEPHONE ENCOUNTER
Reason for Call:  INR    Who is calling?  Patient     Phone number:  [phone]    Fax number:      Name of caller: Todd Aschoff     INR Value:      Are there any other concerns:  Yes: Returning call     Can we leave a detailed message on this number? YES    Phone number patient can be reached at: Home number on file [phone] (home)      Call taken on 12/14/2022 at 12:45 PM by Libby Barry

## 2022-12-22 DIAGNOSIS — F32.A DEPRESSION, UNSPECIFIED DEPRESSION TYPE: ICD-10-CM

## 2022-12-22 RX ORDER — VENLAFAXINE HYDROCHLORIDE 75 MG/1
225 CAPSULE, EXTENDED RELEASE ORAL DAILY
Qty: 270 CAPSULE | Refills: 0 | Status: SHIPPED | OUTPATIENT
Start: 2022-12-22 | End: 2023-03-14

## 2022-12-22 NOTE — TELEPHONE ENCOUNTER
Prescription approved per [facility] Refill Protocol.    Rosa Elena Duran RN

## 2022-12-23 DIAGNOSIS — M62.830 BACK MUSCLE SPASM: ICD-10-CM

## 2022-12-23 RX ORDER — DIAZEPAM 5 MG
TABLET ORAL
Qty: 30 TABLET | Refills: 0 | Status: SHIPPED | OUTPATIENT
Start: 2022-12-23 | End: 2023-01-23

## 2022-12-26 NOTE — TELEPHONE ENCOUNTER
Completed form faxed back.  
INR results received via fax. Form in your mailbox to be signed.    
1.81

## 2023-01-03 ENCOUNTER — TELEPHONE (OUTPATIENT)
Dept: ANTICOAGULATION | Facility: CLINIC | Age: 68
End: 2023-01-03

## 2023-01-03 NOTE — TELEPHONE ENCOUNTER
Reason for Call:  Other call back    Detailed comments: Nicko received a call last week from the INR clinic in [place], unfortunately they weren't able to leave a voicemail as his mailbox is full. He is trying to call back to see what the call is about.    Phone Number Patient can be reached at: Cell number on file:    Telephone Information:   Mobile [phone]       Best Time: Anytime    Can we leave a detailed message on this number? NO    Call taken on 1/3/2023 at 10:39 AM by Madelin Peña

## 2023-01-03 NOTE — TELEPHONE ENCOUNTER
I spoke to patient, he has been clearing out his voicemail on his cell phone, he was listening to old messages as we were trying to reach him for out of range INR on 12/12/22.    Patient did talk to [facility] RN on 12/14/22--see phone encounter.    Patient has INR scheduled for 1/9/23, I encouraged him to watch for our call 1-2 hours after his lab visit.    Angely Smalls RN  Anticoagulation Clinic

## 2023-01-09 ENCOUNTER — LAB (OUTPATIENT)
Dept: LAB | Facility: CLINIC | Age: 68
End: 2023-01-09
Payer: COMMERCIAL

## 2023-01-09 ENCOUNTER — ANTICOAGULATION THERAPY VISIT (OUTPATIENT)
Dept: ANTICOAGULATION | Facility: CLINIC | Age: 68
End: 2023-01-09

## 2023-01-09 DIAGNOSIS — I48.0 PAF (PAROXYSMAL ATRIAL FIBRILLATION) (H): ICD-10-CM

## 2023-01-09 DIAGNOSIS — I48.0 PAF (PAROXYSMAL ATRIAL FIBRILLATION) (H): Primary | ICD-10-CM

## 2023-01-09 DIAGNOSIS — Z95.2 AORTIC VALVE PROSTHESIS PRESENT: ICD-10-CM

## 2023-01-09 LAB — INR BLD: 3 (ref 0.9–1.1)

## 2023-01-09 PROCEDURE — 36415 COLL VENOUS BLD VENIPUNCTURE: CPT

## 2023-01-09 PROCEDURE — 85610 PROTHROMBIN TIME: CPT

## 2023-01-09 NOTE — PROGRESS NOTES
ANTICOAGULATION MANAGEMENT     Todd S Aschoff 67 year old male is on warfarin with therapeutic INR result. (Goal INR 2.5-3.5)    Recent labs: (last 7 days)     01/09/23  1105   INR 3.0*       ASSESSMENT       Source(s): Chart Review    Previous INR was Therapeutic last 2(+) visits    Medication, diet, health changes since last INR chart reviewed; none identified           PLAN     Recommended plan for no diet, medication or health factor changes affecting INR     Dosing Instructions: Continue your current warfarin dose with next INR in 5 weeks       Summary  As of 1/9/2023    Full warfarin instructions:  5 mg every day   Next INR check:  2/13/2023             Detailed voice message left for Nicko with dosing instructions and follow up date.     Contact [phone]  to schedule and with any changes, questions or concerns.     Education provided:     Please call back if any changes to your diet, medications or how you've been taking warfarin    Contact [phone]  with any changes, questions or concerns.     Plan made per [facility] anticoagulation protocol    Lucina Andrew RN  Anticoagulation Clinic  1/9/2023  _______________________________________     Anticoagulation Episode Summary     Current INR goal:  2.5-3.5   TTR:  66.6 % (1 y)   Target end date:  Indefinite   Send INR reminders to:  [facility] [place]    Indications    PAF (paroxysmal atrial fibrillation) (H) [I48.0]  Long term current use of anticoagulant therapy (Resolved) [Z79.01]  Mechanical AV Replacement 1992 -- on Warfarin  [Z95.2]           Comments:           Anticoagulation Care Providers     Provider Role Specialty Phone number    Obdulio Ingram MD Referring Internal Medicine [phone]    Herve Morgan MD Referring Internal Medicine - Pediatrics [phone]

## 2023-01-21 DIAGNOSIS — M62.830 BACK MUSCLE SPASM: ICD-10-CM

## 2023-01-23 RX ORDER — DIAZEPAM 5 MG
TABLET ORAL
Qty: 30 TABLET | Refills: 0 | Status: SHIPPED | OUTPATIENT
Start: 2023-01-23 | End: 2023-02-20

## 2023-01-30 DIAGNOSIS — M54.16 LUMBAR RADICULOPATHY: Primary | ICD-10-CM

## 2023-01-30 RX ORDER — CYCLOBENZAPRINE HCL 10 MG
TABLET ORAL
Qty: 270 TABLET | Refills: 0 | Status: SHIPPED | OUTPATIENT
Start: 2023-01-30 | End: 2023-06-02

## 2023-01-30 NOTE — LETTER
[facility]  [address] [place] [address]     January 13, 2022    Todd S Aschoff  [address]  [place] [address]      Dear Nicko,    I have been unsuccessful in reaching you since our last contact. At this time the Care Coordination team will make no further attempts to reach you, however this does not change your ability to continue receiving care from your providers at your primary care clinic. If you need additional support from a care coordinator in the future please contact me at [phone].    All of us at [facility] are invested in your health and are here to assist you in meeting your goals.     Sincerely,    Vanessa Davenport, RN Care Coordinator  [facility] [place]  Email: [email or web address]  Phone: [phone]      This patient was reassumed in signout.  She had an overdose more than 24 hours ago.  She had medical evaluation yesterday.  She has remained hemodynamically stable.  EKG does not show any changes.  Lab work has been followed.  Poison control has been consulted and then reconsulted.    As of dictation at 7:18 PM, the patient is now medically clear for further psychiatric disposition.    At 2114 the patient had been accepted at [facility] but is declining to go.  This was discussed again with the DEC team.  She is still on the list for another bed possibly at [facility].     Power Ch MD  01/29/23 1918       Power Ch MD  01/29/23 2115

## 2023-02-07 DIAGNOSIS — F33.1 MAJOR DEPRESSIVE DISORDER, RECURRENT EPISODE, MODERATE (H): ICD-10-CM

## 2023-02-07 RX ORDER — GUANFACINE 1 MG/1
TABLET ORAL
Qty: 90 TABLET | Refills: 0 | Status: SHIPPED | OUTPATIENT
Start: 2023-02-07 | End: 2023-05-08

## 2023-02-07 NOTE — TELEPHONE ENCOUNTER
CAROLYNN REFILL x 1.NEEDS OFFICE VISIT FOR FURTHER REFILLS. Appointment scheduled on 3/13/23.     Romina rAriaza RN   PAL (Patient Advocate Liason)  [facility]

## 2023-02-10 ENCOUNTER — ANTICOAGULATION THERAPY VISIT (OUTPATIENT)
Dept: ANTICOAGULATION | Facility: CLINIC | Age: 68
End: 2023-02-10

## 2023-02-10 ENCOUNTER — LAB (OUTPATIENT)
Dept: LAB | Facility: CLINIC | Age: 68
End: 2023-02-10
Payer: COMMERCIAL

## 2023-02-10 DIAGNOSIS — Z95.2 AORTIC VALVE PROSTHESIS PRESENT: ICD-10-CM

## 2023-02-10 DIAGNOSIS — I48.0 PAF (PAROXYSMAL ATRIAL FIBRILLATION) (H): ICD-10-CM

## 2023-02-10 DIAGNOSIS — I48.0 PAF (PAROXYSMAL ATRIAL FIBRILLATION) (H): Primary | ICD-10-CM

## 2023-02-10 LAB — INR BLD: 3.3 (ref 0.9–1.1)

## 2023-02-10 PROCEDURE — 36416 COLLJ CAPILLARY BLOOD SPEC: CPT

## 2023-02-10 PROCEDURE — 85610 PROTHROMBIN TIME: CPT

## 2023-02-10 NOTE — PROGRESS NOTES
Patient called, he reports no changes other than starting Lisinopril in 12/2022.  Patient has PCP visit on 3/13/23 so prefers to have his INR checked that day, as well.    Angely Smalls RN  Anticoagulation Clinic

## 2023-02-10 NOTE — PROGRESS NOTES
ANTICOAGULATION MANAGEMENT     Todd S Aschoff 67 year old male is on warfarin with therapeutic INR result. (Goal INR 2.5-3.5)    Recent labs: (last 7 days)     02/10/23  0918   INR 3.3*       ASSESSMENT       Source(s): Chart Review    Previous INR was Therapeutic last 2(+) visits    Medication, diet, health changes since last INR chart reviewed; none identified           PLAN     Unable to reach Nicko today.    Left detailed message to inform patient to continue same weekly Warfarin dose and to call INR clinic with any questions, concerns or changes.  Also to schedule next INR in 6 weeks    Angely Smalls RN  Anticoagulation Clinic  2/10/2023

## 2023-02-19 DIAGNOSIS — M62.830 BACK MUSCLE SPASM: ICD-10-CM

## 2023-02-20 RX ORDER — DIAZEPAM 5 MG
TABLET ORAL
Qty: 30 TABLET | Refills: 0 | Status: SHIPPED | OUTPATIENT
Start: 2023-02-20 | End: 2023-03-14

## 2023-02-28 DIAGNOSIS — I48.20 CHRONIC ATRIAL FIBRILLATION (H): ICD-10-CM

## 2023-02-28 RX ORDER — PROPAFENONE HYDROCHLORIDE 150 MG/1
150 TABLET, COATED ORAL EVERY 8 HOURS
Qty: 270 TABLET | Refills: 0 | Status: SHIPPED | OUTPATIENT
Start: 2023-02-28 | End: 2023-06-14

## 2023-03-13 ENCOUNTER — LAB (OUTPATIENT)
Dept: LAB | Facility: CLINIC | Age: 68
End: 2023-03-13
Payer: COMMERCIAL

## 2023-03-13 ENCOUNTER — ANTICOAGULATION THERAPY VISIT (OUTPATIENT)
Dept: ANTICOAGULATION | Facility: CLINIC | Age: 68
End: 2023-03-13

## 2023-03-13 DIAGNOSIS — I48.0 PAF (PAROXYSMAL ATRIAL FIBRILLATION) (H): ICD-10-CM

## 2023-03-13 DIAGNOSIS — Z95.2 AORTIC VALVE PROSTHESIS PRESENT: ICD-10-CM

## 2023-03-13 DIAGNOSIS — I48.0 PAF (PAROXYSMAL ATRIAL FIBRILLATION) (H): Primary | ICD-10-CM

## 2023-03-13 LAB — INR BLD: 2.7 (ref 0.9–1.1)

## 2023-03-13 PROCEDURE — 85610 PROTHROMBIN TIME: CPT

## 2023-03-13 PROCEDURE — 36416 COLLJ CAPILLARY BLOOD SPEC: CPT

## 2023-03-13 NOTE — PROGRESS NOTES
ANTICOAGULATION MANAGEMENT     Todd S Aschoff 67 year old male is on warfarin with therapeutic INR result. (Goal INR 2.5-3.5)    Recent labs: (last 7 days)     03/13/23  1328   INR 2.7*       ASSESSMENT       Source(s): Chart Review and Patient/Caregiver Call       Warfarin doses taken: Warfarin taken as instructed    Diet: Increased greens/vitamin K in diet; plans to resume previous intake    New illness, injury, or hospitalization: No    Medication/supplement changes: None noted    Signs or symptoms of bleeding or clotting: No    Previous INR: Therapeutic last 2(+) visits    Additional findings: None         PLAN     Recommended plan for no diet, medication or health factor changes affecting INR     Dosing Instructions: Continue your current warfarin dose with next INR in 6 weeks       Summary  As of 3/13/2023    Full warfarin instructions:  5 mg every day   Next INR check:  4/24/2023             Telephone call with Nicko who verbalizes understanding and agrees to plan    Lab visit scheduled    Education provided:     Please call back if any changes to your diet, medications or how you've been taking warfarin    Contact [phone]  with any changes, questions or concerns.     Plan made per [facility] anticoagulation protocol    Lucina Andrew RN  Anticoagulation Clinic  3/13/2023    _______________________________________________________________________     Anticoagulation Episode Summary     Current INR goal:  2.5-3.5   TTR:  77.9 % (1 y)   Target end date:  Indefinite   Send INR reminders to:  [facility] [place]    Indications    PAF (paroxysmal atrial fibrillation) (H) [I48.0]  Mechanical AV Replacement 1992 -- on Warfarin  [Z95.2]           Comments:           Anticoagulation Care Providers     Provider Role Specialty Phone number    Obdulio Ingram MD Referring Internal Medicine [phone]    Herve Morgan MD Referring Internal Medicine - Pediatrics [phone]

## 2023-03-14 ENCOUNTER — OFFICE VISIT (OUTPATIENT)
Dept: PEDIATRICS | Facility: CLINIC | Age: 68
End: 2023-03-14
Payer: COMMERCIAL

## 2023-03-14 VITALS
BODY MASS INDEX: 40.43 KG/M2 | RESPIRATION RATE: 18 BRPM | DIASTOLIC BLOOD PRESSURE: 81 MMHG | SYSTOLIC BLOOD PRESSURE: 119 MMHG | WEIGHT: 315 LBS | TEMPERATURE: 98.7 F | HEART RATE: 76 BPM | HEIGHT: 74 IN | OXYGEN SATURATION: 99 %

## 2023-03-14 DIAGNOSIS — F32.A DEPRESSION, UNSPECIFIED DEPRESSION TYPE: ICD-10-CM

## 2023-03-14 DIAGNOSIS — R41.3 MEMORY DIFFICULTIES: ICD-10-CM

## 2023-03-14 DIAGNOSIS — F33.1 MAJOR DEPRESSIVE DISORDER, RECURRENT EPISODE, MODERATE (H): ICD-10-CM

## 2023-03-14 DIAGNOSIS — I48.0 PAF (PAROXYSMAL ATRIAL FIBRILLATION) (H): ICD-10-CM

## 2023-03-14 DIAGNOSIS — E66.01 MORBID OBESITY, UNSPECIFIED OBESITY TYPE (H): ICD-10-CM

## 2023-03-14 DIAGNOSIS — E03.9 ACQUIRED HYPOTHYROIDISM: ICD-10-CM

## 2023-03-14 DIAGNOSIS — M62.830 BACK MUSCLE SPASM: ICD-10-CM

## 2023-03-14 DIAGNOSIS — I10 BENIGN ESSENTIAL HYPERTENSION: Primary | ICD-10-CM

## 2023-03-14 DIAGNOSIS — F10.21 ALCOHOL DEPENDENCE IN REMISSION (H): ICD-10-CM

## 2023-03-14 DIAGNOSIS — G40.909 NONINTRACTABLE EPILEPSY WITHOUT STATUS EPILEPTICUS, UNSPECIFIED EPILEPSY TYPE (H): ICD-10-CM

## 2023-03-14 LAB
ANION GAP SERPL CALCULATED.3IONS-SCNC: 14 MMOL/L (ref 7–15)
BUN SERPL-MCNC: 17.4 MG/DL (ref 8–23)
CALCIUM SERPL-MCNC: 9.4 MG/DL (ref 8.8–10.2)
CHLORIDE SERPL-SCNC: 103 MMOL/L (ref 98–107)
CREAT SERPL-MCNC: 1.02 MG/DL (ref 0.67–1.17)
DEPRECATED HCO3 PLAS-SCNC: 22 MMOL/L (ref 22–29)
GFR SERPL CREATININE-BSD FRML MDRD: 81 ML/MIN/1.73M2
GLUCOSE SERPL-MCNC: 100 MG/DL (ref 70–99)
POTASSIUM SERPL-SCNC: 4.4 MMOL/L (ref 3.4–5.3)
SODIUM SERPL-SCNC: 139 MMOL/L (ref 136–145)
T4 FREE SERPL-MCNC: 1.18 NG/DL (ref 0.9–1.7)
TSH SERPL DL<=0.005 MIU/L-ACNC: 4.24 UIU/ML (ref 0.3–4.2)

## 2023-03-14 PROCEDURE — 36415 COLL VENOUS BLD VENIPUNCTURE: CPT | Performed by: INTERNAL MEDICINE

## 2023-03-14 PROCEDURE — 96127 BRIEF EMOTIONAL/BEHAV ASSMT: CPT | Performed by: INTERNAL MEDICINE

## 2023-03-14 PROCEDURE — 84439 ASSAY OF FREE THYROXINE: CPT | Performed by: INTERNAL MEDICINE

## 2023-03-14 PROCEDURE — 84443 ASSAY THYROID STIM HORMONE: CPT | Performed by: INTERNAL MEDICINE

## 2023-03-14 PROCEDURE — 80048 BASIC METABOLIC PNL TOTAL CA: CPT | Performed by: INTERNAL MEDICINE

## 2023-03-14 PROCEDURE — 99214 OFFICE O/P EST MOD 30 MIN: CPT | Performed by: INTERNAL MEDICINE

## 2023-03-14 RX ORDER — VENLAFAXINE HYDROCHLORIDE 75 MG/1
225 CAPSULE, EXTENDED RELEASE ORAL DAILY
Qty: 270 CAPSULE | Refills: 3 | Status: SHIPPED | OUTPATIENT
Start: 2023-03-14 | End: 2024-04-25

## 2023-03-14 RX ORDER — DONEPEZIL HYDROCHLORIDE 10 MG/1
10 TABLET, FILM COATED ORAL AT BEDTIME
Qty: 90 TABLET | Refills: 3 | Status: SHIPPED | OUTPATIENT
Start: 2023-03-14 | End: 2024-04-15

## 2023-03-14 RX ORDER — DIAZEPAM 5 MG
5 TABLET ORAL
Qty: 30 TABLET | Refills: 0 | Status: SHIPPED | OUTPATIENT
Start: 2023-03-21 | End: 2023-04-19

## 2023-03-14 ASSESSMENT — PATIENT HEALTH QUESTIONNAIRE - PHQ9
SUM OF ALL RESPONSES TO PHQ QUESTIONS 1-9: 3
10. IF YOU CHECKED OFF ANY PROBLEMS, HOW DIFFICULT HAVE THESE PROBLEMS MADE IT FOR YOU TO DO YOUR WORK, TAKE CARE OF THINGS AT HOME, OR GET ALONG WITH OTHER PEOPLE: SOMEWHAT DIFFICULT
SUM OF ALL RESPONSES TO PHQ QUESTIONS 1-9: 3

## 2023-03-14 ASSESSMENT — PAIN SCALES - GENERAL: PAINLEVEL: EXTREME PAIN (8)

## 2023-03-14 NOTE — PATIENT INSTRUCTIONS
Your blood pressure looks better today.      Stay on the 10 mg lisinopril.    Lab work today:  We can do labs in the exam room today, or you can get them done downstairs in the lab.      Goal weight: 15 pounds in the next 6 months.  Consider the Weight Watchers tiffany, and the 1600 calorie MyPlate plan below.     Set up a follow up appointment with cardiology for later this year. (For your propafenone)    Continue to see the INR clinic.

## 2023-03-14 NOTE — PROGRESS NOTES
"  Assessment & Plan     Alcohol dependence in remission (H)  In remission.  Not drinking.     Major depressive disorder, recurrent episode, moderate (H)  PHQ controlled.  No suicidal ideation.     PAF (paroxysmal atrial fibrillation) (H)  On profafenone.  Asx.  On coumadin.  Will have patient begin follow up with cards, as has not seen for years, despite being on propafenone.   - Adult Cardiology Eval [facility] Referral; Future    Morbid obesity, unspecified obesity type (H)  Weight loss discussed.  15 pounds.     Nonintractable epilepsy without status epilepticus, unspecified epilepsy type (H)  No further seizure.     Acquired hypothyroidism  Recheck levels today.   - TSH with free T4 reflex; Future    Benign essential hypertension  At goal.   - Basic metabolic panel  (Ca, Cl, CO2, Creat, Gluc, K, Na, BUN); Future    Back muscle spasm  Uses nightly before bed.   - diazepam (VALIUM) 5 MG tablet; Take 1 tablet (5 mg) by mouth nightly as needed for anxiety    Memory difficulties  Stable.  - donepezil (ARICEPT) 10 MG tablet; Take 1 tablet (10 mg) by mouth At Bedtime    Depression, unspecified depression type  Counselled extensively on risks of suicidality.  Contracts for safety.   - venlafaxine (EFFEXOR XR) 75 MG 24 hr capsule; Take 3 capsules (225 mg) by mouth daily             BMI:   Estimated body mass index is 41.1 kg/m  as calculated from the following:    Height as of this encounter: 1.88 m (6' 2\").    Weight as of this encounter: 145.2 kg (320 lb 1.6 oz).   Weight management plan: Patient was referred to their PCP to discuss a diet and exercise plan.    Patient Instructions   Your blood pressure looks better today.      Stay on the 10 mg lisinopril.    Lab work today:  We can do labs in the exam room today, or you can get them done downstairs in the lab.      Goal weight: 15 pounds in the next 6 months.  Consider the Weight Watchers tiffany, and the 1600 calorie MyPlate plan below.     Set up a follow up appointment " with cardiology for later this year. (For your propafenone)    Continue to see the INR clinic.       Return in about 6 months (around 9/14/2023) for with me, physical, in person.    Herve Morgan MD  [facility] [place] is a 67 year old accompanied by his Self, presenting for the following health issues:  RECHECK (BP and testosterone )      History of Present Illness       Reason for visit:  Blood pressure recheck    He eats 2-3 servings of fruits and vegetables daily.He consumes 0 sweetened beverage(s) daily.He exercises with enough effort to increase his heart rate 10 to 19 minutes per day.  He exercises with enough effort to increase his heart rate 7 days per week.   He is taking medications regularly.    Today's PHQ-9         PHQ-9 Total Score: 3    PHQ-9 Q9 Thoughts of better off dead/self-harm past 2 weeks :   Not at all    How difficult have these problems made it for you to do your work, take care of things at home, or get along with other people: Somewhat difficult     Tolerating the lisinopril well;    Weight is up about 15 pounds from last year.                   Review of Systems   CONSTITUTIONAL: NEGATIVE for fever, chills, change in weight  INTEGUMENTARY/SKIN: NEGATIVE for worrisome rashes, moles or lesions  EYES: NEGATIVE for vision changes or irritation  ENT/MOUTH: NEGATIVE for ear, mouth and throat problems  RESP: NEGATIVE for significant cough or SOB  BREAST: NEGATIVE for masses, tenderness or discharge  CV: NEGATIVE for chest pain, palpitations or peripheral edema  GI: NEGATIVE for nausea, abdominal pain, heartburn, or change in bowel habits  : NEGATIVE for frequency, dysuria, or hematuria  MUSCULOSKELETAL: NEGATIVE for significant arthralgias or myalgia  NEURO: NEGATIVE for weakness, dizziness or paresthesias  ENDOCRINE: NEGATIVE for temperature intolerance, skin/hair changes  HEME: NEGATIVE for bleeding problems  PSYCHIATRIC: NEGATIVE for changes in mood or  "affect      Objective    /81   Pulse 76   Temp 98.7  F (37.1  C) (Oral)   Resp 18   Ht 1.88 m (6' 2\")   Wt 145.2 kg (320 lb 1.6 oz)   SpO2 99%   BMI 41.10 kg/m    Body mass index is 41.1 kg/m .  Physical Exam   GENERAL: healthy, alert and no distress  EYES: Eyes grossly normal to inspection, PERRL and conjunctivae and sclerae normal  HENT: ear canals and TM's normal, nose and mouth without ulcers or lesions  NECK: no adenopathy, no asymmetry, masses, or scars and thyroid normal to palpation  RESP: lungs clear to auscultation - no rales, rhonchi or wheezes  CV: regular rate and rhythm, normal S1 S2, no S3 or S4, no murmur, click or rub, no peripheral edema and peripheral pulses strong  ABDOMEN: soft, nontender, no hepatosplenomegaly, no masses and bowel sounds normal  MS: no gross musculoskeletal defects noted, no edema  SKIN: no suspicious lesions or rashes  NEURO: Normal strength and tone, mentation intact and speech normal  PSYCH: mentation appears normal, affect normal/bright                    "

## 2023-04-01 ENCOUNTER — HEALTH MAINTENANCE LETTER (OUTPATIENT)
Age: 68
End: 2023-04-01

## 2023-04-19 DIAGNOSIS — M62.830 BACK MUSCLE SPASM: ICD-10-CM

## 2023-04-19 RX ORDER — DIAZEPAM 5 MG
5 TABLET ORAL
Qty: 30 TABLET | Refills: 0 | Status: SHIPPED | OUTPATIENT
Start: 2023-04-19 | End: 2023-06-02

## 2023-04-25 ENCOUNTER — ANTICOAGULATION THERAPY VISIT (OUTPATIENT)
Dept: ANTICOAGULATION | Facility: CLINIC | Age: 68
End: 2023-04-25

## 2023-04-25 ENCOUNTER — LAB (OUTPATIENT)
Dept: LAB | Facility: CLINIC | Age: 68
End: 2023-04-25
Payer: COMMERCIAL

## 2023-04-25 DIAGNOSIS — I48.0 PAF (PAROXYSMAL ATRIAL FIBRILLATION) (H): ICD-10-CM

## 2023-04-25 DIAGNOSIS — I48.0 PAF (PAROXYSMAL ATRIAL FIBRILLATION) (H): Primary | ICD-10-CM

## 2023-04-25 DIAGNOSIS — Z95.2 AORTIC VALVE PROSTHESIS PRESENT: ICD-10-CM

## 2023-04-25 LAB — INR BLD: 1.8 (ref 0.9–1.1)

## 2023-04-25 PROCEDURE — 36416 COLLJ CAPILLARY BLOOD SPEC: CPT

## 2023-04-25 PROCEDURE — 85610 PROTHROMBIN TIME: CPT

## 2023-04-25 NOTE — PROGRESS NOTES
"ANTICOAGULATION MANAGEMENT     Todd S Aschoff 67 year old male is on warfarin with subtherapeutic INR result. (Goal INR 2.5-3.5)    Recent labs: (last 7 days)     04/25/23  1242   INR 1.8*       ASSESSMENT       Source(s): Chart Review and Patient/Caregiver Call       Warfarin doses taken: Warfarin taken as instructed    Diet: Increased greens/vitamin K in diet; plans to resume previous intake--patient states he has eaten 2-3 servings of greens in the past week, he will resume 1 serving per week (he prefers this versus increasing his warfarin maintenance dose.    Medication/supplement changes: Started taking zinc about 1 month ago.    New illness, injury, or hospitalization: No    Signs or symptoms of bleeding or clotting: Yes: 1 bruise on his back and a wound above eyebrow bled after hitting that area on a cupboard.    Previous result: Therapeutic last 2(+) visits  Additional findings: 3/14/23 office visit- notes state patient is no longer drinking alcohol and has had a 15 pound weight loss. Patient informed me that he has not drunk alcohol for \"years\".             PLAN     Recommended plan for temporary change(s) and ongoing change(s) affecting INR     Dosing Instructions: booster dose then continue your current warfarin dose with next INR in 1 week       Summary  As of 4/25/2023    Full warfarin instructions:  4/25: 7.5 mg; Otherwise 5 mg every day   Next INR check:  5/2/2023             Telephone call with Nicko who agrees to plan and repeated back plan correctly    Lab visit scheduled    Education provided:     Dietary considerations: importance of consistent vitamin K intake and impact of vitamin K foods on INR    Plan made per [facility] anticoagulation protocol    Angely Smalls, RN  Anticoagulation Clinic  4/25/2023    _______________________________________________________________________     Anticoagulation Episode Summary     Current INR goal:  2.5-3.5   TTR:  77.6 % (1 y)   Target end date:  Indefinite   Send INR " reminders to:  [facility] [place]    Indications    PAF (paroxysmal atrial fibrillation) (H) [I48.0]  Mechanical AV Replacement 1992 -- on Warfarin  [Z95.2]           Comments:           Anticoagulation Care Providers     Provider Role Specialty Phone number    Obdulio Ingram MD Referring Internal Medicine [phone]    Herve Morgan MD Referring Internal Medicine - Pediatrics [phone]

## 2023-05-01 NOTE — TELEPHONE ENCOUNTER
"Hospital/TCU/ED for chronic condition Discharge Protocol    \"Hi, my name is Yadi Eisenberg RN, a registered nurse, and I am calling from [facility].  I am calling to follow up and see how things are going for you after your recent emergency visit/hospital/TCU stay.\"    Tell me how you are doing now that you are home?\" im keeping my foot up       Discharge Instructions    \"Let's review your discharge instructions.  What is/are the follow-up recommendations?  Pt. Response: follow up with ortho and inr    \"Has an appointment with your primary care provider been scheduled?\"   not needed.      \"When you see the provider, I would recommend that you bring your medications with you.\"    Medications    \"Tell me what changed about your medicines when you discharged?\"    Changes to chronic meds?    0-1    \"What questions do you have about your medications?\"    None     New diagnoses of heart failure, COPD, diabetes, or MI?    No          On warfarin: \"Were you given any recommendations for follow-up with the anticoagulation clinic?\" Yes - Anticoagulation clinic appointment is already scheduled at appropriate interval    Post Discharge Medication Reconciliation Status: discharge medications reconciled and changed, per note/orders.    Was MTM referral placed (*Make sure to put transitions as reason for referral)?   No    Call Summary    \"What questions or concerns do you have about your recent visit and your follow-up care?\"     none    \"If you have questions or things don't continue to improve, we encourage you contact us through the main clinic number (give number).  Even if the clinic is not open, triage nurses are available 24/7 to help you.     We would like you to know that our clinic has extended hours (provide information).  We also have urgent care (provide details on closest location and hours/contact info)\"      \"Thank you for your time and take care!\"             " 69

## 2023-05-02 ENCOUNTER — DOCUMENTATION ONLY (OUTPATIENT)
Dept: PEDIATRICS | Facility: CLINIC | Age: 68
End: 2023-05-02

## 2023-05-02 ENCOUNTER — ANTICOAGULATION THERAPY VISIT (OUTPATIENT)
Dept: ANTICOAGULATION | Facility: CLINIC | Age: 68
End: 2023-05-02

## 2023-05-02 ENCOUNTER — LAB (OUTPATIENT)
Dept: LAB | Facility: CLINIC | Age: 68
End: 2023-05-02
Payer: COMMERCIAL

## 2023-05-02 DIAGNOSIS — Z95.2 AORTIC VALVE PROSTHESIS PRESENT: ICD-10-CM

## 2023-05-02 DIAGNOSIS — I48.0 PAF (PAROXYSMAL ATRIAL FIBRILLATION) (H): ICD-10-CM

## 2023-05-02 DIAGNOSIS — Z95.2 AORTIC VALVE PROSTHESIS PRESENT: Primary | ICD-10-CM

## 2023-05-02 DIAGNOSIS — I48.0 PAF (PAROXYSMAL ATRIAL FIBRILLATION) (H): Primary | ICD-10-CM

## 2023-05-02 LAB — INR BLD: 2.3 (ref 0.9–1.1)

## 2023-05-02 PROCEDURE — 85610 PROTHROMBIN TIME: CPT

## 2023-05-02 PROCEDURE — 36416 COLLJ CAPILLARY BLOOD SPEC: CPT

## 2023-05-02 NOTE — PROGRESS NOTES
ANTICOAGULATION CLINIC REFERRAL RENEWAL REQUEST       An annual renewal order is required for all patients referred to [facility].?  Please review and sign the pended referral order for Todd S Aschoff.       ANTICOAGULATION SUMMARY      Warfarin indication(s)   Atrial Fibrillation and Mechanical AVR    Mechanical heart valve present?  Mechanical  AVR-Bileaflet       Current goal range   INR: 2.5-3.5     Goal appropriate for indication? Goal INR 2.5-3.5 standard for indication(s) above     Time in Therapeutic Range (TTR)  (Goal > 60%) 77.1%       Office visit with referring provider's group within last year yes on 12/12/22       Marianne Andrew RN  [facility]

## 2023-05-02 NOTE — PROGRESS NOTES
ANTICOAGULATION MANAGEMENT     Todd S Aschoff 67 year old male is on warfarin with subtherapeutic INR result. (Goal INR 2.5-3.5)    Recent labs: (last 7 days)     05/02/23  1117   INR 2.3*       ASSESSMENT       Source(s): Chart Review and Patient/Caregiver Call       Warfarin doses taken: Warfarin taken as instructed - confident he has not missed any doses in the last week    Diet: No new diet changes identified - as reported last week, increased greens and anticipates that this will likely be his new normal    Medication/supplement changes: None noted    New illness, injury, or hospitalization: No    Signs or symptoms of bleeding or clotting: No    Previous result: Subtherapeutic    Additional findings: None         PLAN     Recommended plan for no diet, medication or health factor changes affecting INR     Dosing Instructions: Increase your warfarin dose (7.1% change) with next INR in 2 weeks       Summary  As of 5/2/2023    Full warfarin instructions:  7.5 mg every Tue; 5 mg all other days   Next INR check:  5/16/2023             Telephone call with Nicko who verbalizes understanding and agrees to plan    Lab visit scheduled    Education provided:     Please call back if any changes to your diet, medications or how you've been taking warfarin    Plan made per [facility] anticoagulation protocol    Marianne Andrew RN  Anticoagulation Clinic  5/2/2023    _______________________________________________________________________     Anticoagulation Episode Summary     Current INR goal:  2.5-3.5   TTR:  77.1 % (1 y)   Target end date:  Indefinite   Send INR reminders to:  [facility]    Indications    PAF (paroxysmal atrial fibrillation) (H) [I48.0]  Mechanical AV Replacement 1992 -- on Warfarin  [Z95.2]           Comments:           Anticoagulation Care Providers     Provider Role Specialty Phone number    Obdulio Ingram MD Referring Internal Medicine [phone]    Herve Morgan MD Referring Internal  Medicine - Pediatrics [phone]

## 2023-05-16 ENCOUNTER — LAB (OUTPATIENT)
Dept: LAB | Facility: CLINIC | Age: 68
End: 2023-05-16
Payer: COMMERCIAL

## 2023-05-16 ENCOUNTER — ANTICOAGULATION THERAPY VISIT (OUTPATIENT)
Dept: ANTICOAGULATION | Facility: CLINIC | Age: 68
End: 2023-05-16

## 2023-05-16 DIAGNOSIS — I48.0 PAF (PAROXYSMAL ATRIAL FIBRILLATION) (H): ICD-10-CM

## 2023-05-16 DIAGNOSIS — Z95.2 AORTIC VALVE PROSTHESIS PRESENT: ICD-10-CM

## 2023-05-16 DIAGNOSIS — I48.0 PAF (PAROXYSMAL ATRIAL FIBRILLATION) (H): Primary | ICD-10-CM

## 2023-05-16 LAB — INR BLD: 2.4 (ref 0.9–1.1)

## 2023-05-16 PROCEDURE — 36416 COLLJ CAPILLARY BLOOD SPEC: CPT

## 2023-05-16 PROCEDURE — 85610 PROTHROMBIN TIME: CPT

## 2023-05-16 NOTE — PROGRESS NOTES
ANTICOAGULATION MANAGEMENT     Todd S Aschoff 67 year old male is on warfarin with subtherapeutic INR result. (Goal INR 2.5-3.5)    Recent labs: (last 7 days)     05/16/23  1058   INR 2.4*       ASSESSMENT       Source(s): Chart Review and Patient/Caregiver Call       Warfarin doses taken: Less warfarin taken than planned which may be affecting INR - took 7.5 mg only on 5/2 and not 5/9    Diet: No new diet changes identified - cut down greens slightly the last couple weeks (went from 2-3 servings weekly to 2 servings weekly)    Medication/supplement changes: None noted    New illness, injury, or hospitalization: No    Signs or symptoms of bleeding or clotting: No    Previous result: Subtherapeutic    Additional findings: None         PLAN     Recommended plan for temporary change(s) affecting INR     Dosing Instructions: Continue your current warfarin dose (return to proper maintenance dose) with next INR in 2 weeks       Summary  As of 5/16/2023    Full warfarin instructions:  7.5 mg every Tue; 5 mg all other days   Next INR check:  5/30/2023             Telephone call with Nicko who verbalizes understanding and agrees to plan    Lab visit scheduled    Education provided:     Please call back if any changes to your diet, medications or how you've been taking warfarin    Taking warfarin: Importance of taking warfarin as instructed    Plan made per [facility] anticoagulation protocol    Marianne Andrew RN  Anticoagulation Clinic  5/16/2023    _______________________________________________________________________     Anticoagulation Episode Summary     Current INR goal:  2.5-3.5   TTR:  73.2 % (1 y)   Target end date:  Indefinite   Send INR reminders to:  [facility]    Indications    PAF (paroxysmal atrial fibrillation) (H) [I48.0]  Mechanical AV Replacement 1992 -- on Warfarin  [Z95.2]           Comments:           Anticoagulation Care Providers     Provider Role Specialty Phone number    Obdulio Ingram MD  Referring Internal Medicine [phone]    Herve Morgan MD Referring Internal Medicine - Pediatrics [phone]

## 2023-06-02 ENCOUNTER — LAB (OUTPATIENT)
Dept: LAB | Facility: CLINIC | Age: 68
End: 2023-06-02
Payer: COMMERCIAL

## 2023-06-02 ENCOUNTER — ANTICOAGULATION THERAPY VISIT (OUTPATIENT)
Dept: ANTICOAGULATION | Facility: CLINIC | Age: 68
End: 2023-06-02

## 2023-06-02 DIAGNOSIS — M62.830 BACK MUSCLE SPASM: ICD-10-CM

## 2023-06-02 DIAGNOSIS — I48.0 PAF (PAROXYSMAL ATRIAL FIBRILLATION) (H): ICD-10-CM

## 2023-06-02 DIAGNOSIS — Z95.2 AORTIC VALVE PROSTHESIS PRESENT: ICD-10-CM

## 2023-06-02 DIAGNOSIS — I48.0 PAF (PAROXYSMAL ATRIAL FIBRILLATION) (H): Primary | ICD-10-CM

## 2023-06-02 LAB — INR BLD: 2.4 (ref 0.9–1.1)

## 2023-06-02 PROCEDURE — 36416 COLLJ CAPILLARY BLOOD SPEC: CPT

## 2023-06-02 PROCEDURE — 85610 PROTHROMBIN TIME: CPT

## 2023-06-02 RX ORDER — DIAZEPAM 5 MG
5 TABLET ORAL
Qty: 30 TABLET | Refills: 0 | Status: SHIPPED | OUTPATIENT
Start: 2023-06-02 | End: 2023-06-26

## 2023-06-02 NOTE — PROGRESS NOTES
ANTICOAGULATION MANAGEMENT     Todd S Aschoff 67 year old male is on warfarin with subtherapeutic INR result. (Goal INR 2.5-3.5)    Recent labs: (last 7 days)     06/02/23  1343   INR 2.4*       ASSESSMENT       Source(s): Chart Review    Previous INR was Subtherapeutic    Medication, diet, health changes since last INR chart reviewed; none identified      2nd subtherapeutic INR so plan is to increase warfarin maintenance dose 6.7%.       PLAN     Unable to reach Nicko today x 2    Left message with new warfarin maintenance dose instructions    Follow up required to discuss out of range result  and discuss dosing instructions and confirm understanding of instructions    Angely Smalls, RN  Anticoagulation Clinic  6/2/2023

## 2023-06-02 NOTE — TELEPHONE ENCOUNTER
Pending Prescriptions:                       Disp   Refills    diazepam (VALIUM) 5 MG tablet             30 tab*0            Sig: Take 1 tablet (5 mg) by mouth nightly as needed           for anxiety    Pt came in and wanted this medication refilled. Routing to PCP for guidance

## 2023-06-05 NOTE — PROGRESS NOTES
Left VM for BOTH patient and wife to call [phone] with transfer to Angely OR reynaldo Smalls RN  Anticoagulation Clinic

## 2023-06-06 NOTE — PROGRESS NOTES
No call back but I do see that patient scheduled INR for 6/12/23 so closing this encounter.    Angely Smalls RN  Anticoagulation Clinic

## 2023-06-13 DIAGNOSIS — I48.20 CHRONIC ATRIAL FIBRILLATION (H): ICD-10-CM

## 2023-06-14 ENCOUNTER — ANTICOAGULATION THERAPY VISIT (OUTPATIENT)
Dept: ANTICOAGULATION | Facility: CLINIC | Age: 68
End: 2023-06-14

## 2023-06-14 ENCOUNTER — LAB (OUTPATIENT)
Dept: LAB | Facility: CLINIC | Age: 68
End: 2023-06-14
Payer: COMMERCIAL

## 2023-06-14 DIAGNOSIS — Z95.2 AORTIC VALVE PROSTHESIS PRESENT: ICD-10-CM

## 2023-06-14 DIAGNOSIS — I48.0 PAF (PAROXYSMAL ATRIAL FIBRILLATION) (H): ICD-10-CM

## 2023-06-14 DIAGNOSIS — I48.0 PAF (PAROXYSMAL ATRIAL FIBRILLATION) (H): Primary | ICD-10-CM

## 2023-06-14 LAB — INR BLD: 3.4 (ref 0.9–1.1)

## 2023-06-14 PROCEDURE — 36416 COLLJ CAPILLARY BLOOD SPEC: CPT

## 2023-06-14 PROCEDURE — 85610 PROTHROMBIN TIME: CPT

## 2023-06-14 RX ORDER — PROPAFENONE HYDROCHLORIDE 150 MG/1
150 TABLET, COATED ORAL EVERY 8 HOURS
Qty: 90 TABLET | Refills: 0 | Status: SHIPPED | OUTPATIENT
Start: 2023-06-14 | End: 2023-07-27

## 2023-06-14 NOTE — PROGRESS NOTES
ANTICOAGULATION MANAGEMENT     Todd S Aschoff 67 year old male is on warfarin with therapeutic INR result. (Goal INR 2.5-3.5)    Recent labs: (last 7 days)     06/14/23  0841   INR 3.4*       ASSESSMENT       Source(s): Chart Review    Previous INR was Subtherapeutic.  Maintenance dose was increased 6.7% at last visit.  Will need to confirm with patient if new dosing regimen was followed.     Medication, diet, health changes since last INR chart reviewed; none identified             PLAN     Unable to reach Nicko today.    Left message for patient to call INR clinic to discuss results.    Follow up required to confirm warfarin dose taken and assess for changes    Amanda Wheeler RN  Anticoagulation Clinic  6/14/2023

## 2023-06-14 NOTE — PROGRESS NOTES
ANTICOAGULATION MANAGEMENT     Todd S Aschoff 67 year old male is on warfarin with therapeutic INR result. (Goal INR 2.5-3.5)    Recent labs: (last 7 days)     06/14/23  0841   INR 3.4*       ASSESSMENT       Source(s): Chart Review and Patient/Caregiver Call       Warfarin doses taken: Warfarin taken as instructed    Diet: No new diet changes identified    Medication/supplement changes: None noted    New illness, injury, or hospitalization: No    Signs or symptoms of bleeding or clotting: No    Previous result: Subtherapeutic    Additional findings: None         PLAN     Recommended plan for no diet, medication or health factor changes affecting INR     Dosing Instructions: Continue your current warfarin dose with next INR in 3 weeks       Summary  As of 6/14/2023    Full warfarin instructions:  7.5 mg every Tue, Fri; 5 mg all other days   Next INR check:  7/5/2023             Telephone call with Nicko who agrees to plan and repeated back plan correctly    Lab visit scheduled    Education provided:     Please call back if any changes to your diet, medications or how you've been taking warfarin    Plan made per [facility] anticoagulation protocol    Amanda Wheeler RN  Anticoagulation Clinic  6/14/2023    _______________________________________________________________________     Anticoagulation Episode Summary     Current INR goal:  2.5-3.5   TTR:  68.2 % (1 y)   Target end date:  Indefinite   Send INR reminders to:  AIDA GUERRERO    Indications    PAF (paroxysmal atrial fibrillation) (H) [I48.0]  Mechanical AV Replacement 1992 -- on Warfarin  [Z95.2]           Comments:           Anticoagulation Care Providers     Provider Role Specialty Phone number    Obdulio Ingram MD Referring Internal Medicine [phone]    Herve Morgan MD Referring Internal Medicine - Pediatrics [phone]

## 2023-06-15 NOTE — TELEPHONE ENCOUNTER
Refill for Profafenone done today.  Patient needs to see and followup with cardiology.  Please call and inform patient.

## 2023-06-26 DIAGNOSIS — M62.830 BACK MUSCLE SPASM: ICD-10-CM

## 2023-06-26 RX ORDER — DIAZEPAM 5 MG
TABLET ORAL
Qty: 30 TABLET | Refills: 0 | Status: SHIPPED | OUTPATIENT
Start: 2023-06-26 | End: 2023-07-27

## 2023-07-05 ENCOUNTER — LAB (OUTPATIENT)
Dept: LAB | Facility: CLINIC | Age: 68
End: 2023-07-05
Payer: COMMERCIAL

## 2023-07-05 ENCOUNTER — ANTICOAGULATION THERAPY VISIT (OUTPATIENT)
Dept: ANTICOAGULATION | Facility: CLINIC | Age: 68
End: 2023-07-05

## 2023-07-05 DIAGNOSIS — I48.0 PAF (PAROXYSMAL ATRIAL FIBRILLATION) (H): ICD-10-CM

## 2023-07-05 DIAGNOSIS — I48.0 PAF (PAROXYSMAL ATRIAL FIBRILLATION) (H): Primary | ICD-10-CM

## 2023-07-05 DIAGNOSIS — Z95.2 AORTIC VALVE PROSTHESIS PRESENT: ICD-10-CM

## 2023-07-05 LAB — INR BLD: 3.6 (ref 0.9–1.1)

## 2023-07-05 PROCEDURE — 36416 COLLJ CAPILLARY BLOOD SPEC: CPT

## 2023-07-05 PROCEDURE — 85610 PROTHROMBIN TIME: CPT

## 2023-07-05 NOTE — PROGRESS NOTES
ANTICOAGULATION MANAGEMENT          PLAN     Unable to reach Nicko x2 today.    Left message to continue current dose of warfarin 5 mg tonight. Request call back for assessment.    Follow up required to confirm warfarin dose taken and assess for changes    Amanda Wheeler RN  Anticoagulation Clinic  7/5/2023

## 2023-07-05 NOTE — PROGRESS NOTES
ANTICOAGULATION MANAGEMENT     Todd S Aschoff 67 year old male is on warfarin with supratherapeutic INR result. (Goal INR 2.5-3.5)    Recent labs: (last 7 days)     07/05/23  1059   INR 3.6*       ASSESSMENT       Source(s): Chart Review    Previous INR was Therapeutic last visit; previously outside of goal range    Medication, diet, health changes since last INR chart reviewed; none identified             PLAN     Unable to reach Nicko today.    Left message for patient to call INR Clinic to discuss result.    Follow up required to confirm warfarin dose taken and assess for changes    Amanda Wheeler RN  Anticoagulation Clinic  7/5/2023

## 2023-07-06 NOTE — PROGRESS NOTES
Patient returned call to INR clinic.  Denied any changes in health, diet or medications.  Advised to continue same dose of warfarin and assisted to schedule next lab appointment in 2 weeks.

## 2023-07-07 ENCOUNTER — ANCILLARY PROCEDURE (OUTPATIENT)
Dept: GENERAL RADIOLOGY | Facility: CLINIC | Age: 68
End: 2023-07-07
Attending: ORTHOPAEDIC SURGERY
Payer: COMMERCIAL

## 2023-07-07 ENCOUNTER — OFFICE VISIT (OUTPATIENT)
Dept: ORTHOPEDICS | Facility: CLINIC | Age: 68
End: 2023-07-07
Payer: COMMERCIAL

## 2023-07-07 VITALS — SYSTOLIC BLOOD PRESSURE: 117 MMHG | DIASTOLIC BLOOD PRESSURE: 82 MMHG

## 2023-07-07 DIAGNOSIS — G89.29 CHRONIC LEFT SHOULDER PAIN: ICD-10-CM

## 2023-07-07 DIAGNOSIS — M25.512 CHRONIC LEFT SHOULDER PAIN: ICD-10-CM

## 2023-07-07 DIAGNOSIS — M19.012 ARTHRITIS OF LEFT GLENOHUMERAL JOINT: Primary | ICD-10-CM

## 2023-07-07 PROCEDURE — 99204 OFFICE O/P NEW MOD 45 MIN: CPT | Performed by: ORTHOPAEDIC SURGERY

## 2023-07-07 PROCEDURE — 73030 X-RAY EXAM OF SHOULDER: CPT | Mod: TC | Performed by: RADIOLOGY

## 2023-07-07 NOTE — PATIENT INSTRUCTIONS
[facility] [address], [address]  [address] [address]   Appointments: [phone] Appointments: [phone]   Fax: [phone] Fax: [phone]         Please contact my office with any questions, [phone].

## 2023-07-07 NOTE — PROGRESS NOTES
CHIEF COMPLAINT: Left shoulder pain    DIAGNOSIS: Left shoulder osteoarthritis    OCCUPATION/SPORT: retired    HPI:   Todd S Aschoff is a very pleasant 67 year old, right-hand dominant male who presents for evaluation of left shoulder pain.  Symptoms started around 8 or 9 months ago.  He denies any trauma or injury to the shoulder. Patient states he has a bone spur on his shoulder that has progressively got worse. The pain is located over the entire shoulder. Worst pain is rated a 8-9 of 10, and current pain is rated at 8 of 10. Symptoms are worse with sleep as well as activity.  Symptoms are improved with rest and Tylenol. Associated symptoms include dull and sharp pain. Patient has nothing radiating down the arm, no numbness. No other concerns or complaints at this time.  Of note the patient has attempted corticosteroid injections twice into the left shoulder with no relief.  He has not done any formal physical therapy for his left shoulder.      PAST MEDICAL HISTORY:  Past Medical History:   Diagnosis Date     Advanced directives, counseling/discussion 1/6/2012    Discussed Advance Directive planning with patient; information given to patient to review.     Alcohol dependence (H)      Alcohol dependence in remission (H) 8/2/2018     Allergy, unspecified not elsewhere classified     seasonal     Anemia, unspecified type 1/22/2021     Aortic valve prosthesis present 2/8/2021     Atrial fibrillation (H)      Benign prostatic hyperplasia 2/8/2021     Bilateral thoracic back pain 11/16/2016     BPH (benign prostatic hypertrophy)      Chronic atrial fibrillation (H) 8/7/2002     Chronic infection     low back wound incision not healing      Chronic low back pain 8/19/2011     Chronic pain     lower back and right leg and left leg     Depressive disorder 6/9/2010    Depression  NOS     Dissection of aorta, thoracic 1992    [employer] aotic valve + arch graft 1992     Elevated prostate specific antigen (PSA) 1/22/2020      Family history of prostate cancer 1/22/2020     Generalized muscle weakness 1/22/2021     Headache(784.0)      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 Provider to review     Leg wound, left 1/9/2021     Long term current use of anticoagulant therapy 8/7/2002    LW Modifier:  Coumadin, since mechanical aortic valve LW Onset:  1992 ; Anticoagulant Rx Long Term Problem list name updated by automated process. Provider to review     Low back pain      Lumbar radiculopathy 4/3/2013     Lumbar surgical wound fluid collection 5/23/2013     Major depression      Memory difficulties 1/16/2015     Mixed hyperlipidemia      Morbid obesity (H) 4/20/2010    LW Modifier:  BMI 41.3 (Apr 2010) ; Obesity Morbid     Numbness and tingling     right leg post surg rightt hip/ also left leg since surg     OA (osteoarthritis)     hips     OAB (overactive bladder) 5/11/2015     Obesity, unspecified      Persons encountering health services in other specified circumstances 4/3/2012    Formatting of this note might be different from the original. EMERGENCY CARE PLAN Presenting Problem Signs and Symptoms Treatment Plan   Questions or conerns during clinic hours    I will call the clinic directly    Questions or conerns outside clinic hours    I will call the 24 hour nurse line at [phone]   Patient needs to schedule an appointment    I will call the 24 hour scheduling team at     Polypharmacy 1/22/2021     Prostate infection      Radiculitis 4/20/2010    LW Modifier:  Right foot, s/p R AMANDA LW Onset:  2002 ; Neuralgia     Recurrent major depressive episodes, in full remission (H) 10/30/2012     Renal insufficiency 1/22/2021     Rotator cuff tear 1/26/2015    Rotator cuff tear, right      S/P lumbar fusion 4/5/2018     S/P 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 F's valve     APPLY WOUND VAC Left 1/26/2021    Procedure: Placement of negative pressure wound therapy 2,400 squared centimeters  ;  Surgeon: Lazaro Plascencia MD;  Location: [facility] OR     CATARACT IOL, RT/LT      rt eye only     CHOLECYSTECTOMY, LAPOROSCOPIC  8/10     CLOSE SECONDARY WOUND LOWER EXTREMITY Left 2/3/2021    Procedure:  Split Thickness Skin Graft to Leg of 18 square centimeters  Intermediate Closure of Leg of 8cm   ;  Surgeon: Lazaro Plascencia MD;  Location: [facility] OR     COLONOSCOPY N/A 1/15/2015    Procedure: COLONOSCOPY;  Surgeon: Johnson Kothari MD;  Location:  GI     DECOMPRESSION LUMBAR ONE LEVEL  4/3/2013    Procedure: DECOMPRESSION LUMBAR ONE LEVEL;  Open Decompression L3-4 bilateral;  Surgeon: Travis Coffey MD;  Location: [facility] OR     DECOMPRESSION, FUSION CERVICAL ANTERIOR ONE LEVEL, COMBINED  3/23/2012    Procedure:COMBINED DECOMPRESSION, FUSION CERVICAL ANTERIOR ONE LEVEL; Anterior Cervical Decompression and Fusion C4-6; Surgeon:TRAVIS COFFEY; Location:[facility] OR     ELBOW SURGERY       EXPLORE SPINE, REMOVE HARDWARE, COMBINED  5/23/2013    Procedure: COMBINED EXPLORE SPINE, REMOVE HARDWARE;  Exploration Lumbar Wound for fluid collection;  Surgeon: Travis Coffey MD;  Location: [facility] OR     FUSION CERVICAL ANTERIOR TWO LEVELS  3/26/2012    Procedure:FUSION CERVICAL ANTERIOR TWO LEVELS; Anterior Cervical Fusion  C4-6, Anterior Cervical  Hematoma Evacuation; Surgeon:TRAVIS COFFEY; Location:[facility] OR     IR LUMBAR EPIDURAL STEROID INJECTION  3/28/2003     IRRIGATION AND DEBRIDEMENT LOWER EXTREMITY, COMBINED Left 1/10/2021    Procedure: 1.  Irrigation and excisional debridement to muscle left distal medial calf chronic wounds total area measuring 9 cm x 4 cm 2.  Irrigation and excisional debridement to fascia left medial calf chronic hematoma measuring 29 x 6.7 x 4.2 cm 3.  Primary complex wound closure left medial distal calf 9 cm in length;  Surgeon: Rod Kemp MD;  Location: [facility]     IRRIGATION AND DEBRIDEMENT LOWER EXTREMITY, COMBINED Left 1/26/2021    Procedure: Evacuation of hematoma debridement of skin, subcutaneous tissue and muscle 2,400 squared centimeters, placement of negative pressure wound therapy 2,400 squared centimeters;  Surgeon: Lazaro Plascencia MD;  Location: [facility] OR     IRRIGATION AND DEBRIDEMENT SPINE, CLOSE WOUND, COMBINED  3/26/2012    Procedure:COMBINED IRRIGATION AND DEBRIDEMENT SPINE, CLOSE WOUND; Surgeon:TRAVIS COFFEY; Location:[facility] OR     OTHER SURGICAL HISTORY      MA UNLISTED ORBIT     OTHER SURGICAL HISTORY      MA UNLISTED SPINE     OTHER SURGICAL HISTORY      MA UNLISTED NECK/THORAX     OTHER SURGICAL HISTORY      (IA) MA TOTAL HIP ARTHROPLASTY     OTHER SURGICAL HISTORY      (IA) MA NEE SCOPE MED W LAT MENISCECT WWO DEBRIBE/SHAVE ANY CO     removal of cyst of back   2.5week     TONSILLECTOMY       wisdom teeth[       [facility] NONSPECIFIC PROCEDURE  1992    repair of TAA with graft     [facility] TOTAL HIP ARTHROPLASTY  2010    Left AMANDA     [facility] TOTAL HIP ARTHROPLASTY  2002    R hip replacement comp's by nerve injury with pain down into R leg, nadya below knee       CURRENT MEDICATIONS:  Current Outpatient Medications   Medication Sig Dispense Refill     diazepam (VALIUM) 5 MG tablet Take 1 tablet  by mouth nightly as needed for anxiety 30 tablet 0     acetaminophen (TYLENOL) 500 MG tablet Take  "500-1,000 mg by mouth 2 times daily        azelastine (ASTELIN) 0.1 % nasal spray Spray 1 spray into both nostrils 2 times daily 30 mL 5     cyclobenzaprine (FLEXERIL) 10 MG tablet TAKE ONE TABLET BY MOUTH THREE TIMES DAILY AS NEEDED FOR MUSCLE SPASM. 270 tablet 3     donepezil (ARICEPT) 10 MG tablet Take 1 tablet (10 mg) by mouth At Bedtime 90 tablet 3     folic acid (FOLVITE) 1 MG tablet Take 5 tablets  by mouth daily 450 tablet 3     guanFACINE (TENEX) 1 MG tablet Take one tablet by mouth At Bedtime 90 tablet 0     levothyroxine (SYNTHROID/LEVOTHROID) 175 MCG tablet Take 1 tablet (175 mcg) by mouth daily 90 tablet 3     lisinopril (ZESTRIL) 10 MG tablet Take 1 tablet (10 mg) by mouth daily 90 tablet 3     pregabalin (LYRICA) 100 MG capsule Take 1 capsule (100 mg) by mouth 3 times daily Do not take if sleepy/sedated. 270 capsule 3     propafenone (RYTHMOL) 150 MG TABS tablet Take 1 tablet (150 mg) by mouth every 8 hours 90 tablet 0     simvastatin (ZOCOR) 40 MG tablet Take 1 tablet (40 mg) by mouth At Bedtime. Need to update cholesterol level before additional refills. 90 tablet 3     venlafaxine (EFFEXOR XR) 75 MG 24 hr capsule Take 3 capsules (225 mg) by mouth daily 270 capsule 3     warfarin ANTICOAGULANT (COUMADIN) 2.5 MG tablet Take 5 mg by mouth daily 7.5mg=Wed and Sun, 5mg=ROW       warfarin ANTICOAGULANT (COUMADIN) 5 MG tablet Take one tablet  by mouth daily or as directed by INR Clinic. 90 tablet 3       ALLERGIES:      Allergies   Allergen Reactions     Blood Transfusion Related (Informational Only) Other (See Comments)     Patient has a history of a clinically significant antibody against RBC antigens.  A delay in compatible RBCs may occur.       Ciprofloxacin      \"Went nuts\"     Gabapentin      Severe behavioral disturbances         FAMILY HISTORY: No pertinent family history, reviewed in EMR.    SOCIAL HISTORY:   Social History     Socioeconomic History     Marital status:      Spouse name: " Not on file     Number of children: 3     Years of education: Not on file     Highest education level: High school graduate   Occupational History     Employer: DISABLED     Comment: Previous , disabled since ~2013   Tobacco Use     Smoking status: Never     Smokeless tobacco: Never   Vaping Use     Vaping Use: Never used   Substance and Sexual Activity     Alcohol use: No     Comment: Stopped drinking alcohol ~2009     Drug use: No     Sexual activity: Not Currently   Other Topics Concern     Parent/sibling w/ CABG, MI or angioplasty before 65F 55M? 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  (last updated 6/18/2021)      Social Determinants of Health     Financial Resource Strain: Low Risk  (12/7/2021)    Overall Financial Resource Strain (CARDIA)      Difficulty of Paying Living Expenses: Not hard at all   Food Insecurity: No Food Insecurity (12/7/2021)    Hunger Vital Sign      Worried About Running Out of Food in the Last Year: Never true      Ran Out of Food in the Last Year: Never true   Transportation Needs: No Transportation Needs (12/7/2021)    PRAPARE - Transportation      Lack of Transportation (Medical): No      Lack of Transportation (Non-Medical): No   Physical Activity: Inactive (12/7/2021)    Exercise Vital Sign      Days of Exercise per Week: 0 days      Minutes of Exercise per Session: 30 min   Stress: No Stress Concern Present (12/7/2021)    [employer] [place] [employer] - Occupational Stress Questionnaire      Feeling of Stress : Not at all   Social Connections: Unknown (12/7/2021)    Social Connection and Isolation Panel [NHANES]      Frequency of Communication with Friends and Family: More than three times a week      Frequency of Social Gatherings with Friends and Family: Patient refused      Attends Confucianist Services: Patient refused      Active Member of Clubs or Organizations: No       Attends Club or Organization Meetings: Not on file      Marital Status:    Intimate Partner Violence: Not At Risk (10/29/2019)    Humiliation, Afraid, Rape, and Kick questionnaire      Fear of Current or Ex-Partner: No      Emotionally Abused: No      Physically Abused: No      Sexually Abused: No   Housing Stability: Low Risk  (12/7/2021)    Housing Stability Vital Sign      Unable to Pay for Housing in the Last Year: No      Number of Places Lived in the Last Year: 1      Unstable Housing in the Last Year: No       REVIEW OF SYSTEMS: Positive for that noted in past medical history and history of present illness and otherwise reviewed in EMR    PHYSICAL EXAM:  Patient is Data Unavailable and weighs 0 lbs 0 oz There were no vitals taken for this visit.  There is no height or weight on file to calculate BMI.   Constitutional: Well-developed, well-nourished, healthy appearing male.  Skin: Warm, dry   HEENT: Normal  Cardiac: Well perfused extremities, strong 2+ peripheral pulses. No edema.   Pulmonary: Breathing room air    Musculoskeletal:   Left shoulder:  AROM left shoulder: 160 /120 /40/12  5/5 supraspinatus, 5/5 infraspinatus, 5/5 subscapularis  Neurovascular exam and cervical spine exam are normal.    X-RAYS:   AP, lateral, zanca, and axillary radiographs of the left shoulder were ordered and reviewed by me personally showing no acute fracture or dislocation.  Significant joint space narrowing of the glenohumeral joint with osteophyte formation difficult to assess the glenoid wear on the axillary.      ADVANCED IMAGING:     IMPRESSION: 67 year old right hand dominant male, with left shoulder osteoarthritis.     PLAN:     I did nice discussion with Nicko today about the diagnosis and treatment plan.  We discussed that the patient has primary osteoarthritis of the left shoulder.  We talked about options including conservative versus surgical intervention.  Conservatively we discussed topical  anti-inflammatories the patient is on chronic anticoagulation and cannot take oral anti-inflammatories, corticosteroid injections and nerve ablations, living with the shoulder as is..   He was interested in discussing shoulder arthroplasty.  At this point in time we do not feel that he is a candidate for arthroplasty given his BMI of 41.  We discussed that the risks of surgery including the elevated risk of infection would potentially outweigh the benefit.  Additionally has history of MRSA colonization with chronically infected wounds of the lower extremity necessitating multiple irrigation and debridement and eventually flap type procedure.  We will plan for referral to a nutritionist/dietitian to assist with weight management. We discussed that he would potentially be a candidate for total shoulder arthroplasty with a BMI of less than 40 if we can mitigate his risk of infection is much as possible.. He will follow up on an as needed basis.     I discussed with the patient the etiology of their condition. We discussed at length the options as noted above.     At the conclusion of the office visit, Nicko verbally acknowledged that I answered all of his questions satisfactorily.    Brandy Bowser MD  Orthopedic Surgery Sports Medicine and Shoulder Surgery

## 2023-07-07 NOTE — LETTER
7/7/2023         RE: Todd S Aschoff  [address]  [place] [address]        Dear Colleague,    Thank you for referring your patient, Todd S Aschoff, to the [facility] [place]. Please see a copy of my visit note below.    CHIEF COMPLAINT: Left shoulder pain    DIAGNOSIS: Left shoulder osteoarthritis    OCCUPATION/SPORT: retired    HPI:   Todd S Aschoff is a very pleasant 67 year old, right-hand dominant male who presents for evaluation of left shoulder pain.  Symptoms started around 8 or 9 months ago.  He denies any trauma or injury to the shoulder. Patient states he has a bone spur on his shoulder that has progressively got worse. The pain is located over the entire shoulder. Worst pain is rated a 8-9 of 10, and current pain is rated at 8 of 10. Symptoms are worse with sleep as well as activity.  Symptoms are improved with rest and Tylenol. Associated symptoms include dull and sharp pain. Patient has nothing radiating down the arm, no numbness. No other concerns or complaints at this time.  Of note the patient has attempted corticosteroid injections twice into the left shoulder with no relief.  He has not done any formal physical therapy for his left shoulder.      PAST MEDICAL HISTORY:  Past Medical History:   Diagnosis Date     Advanced directives, counseling/discussion 1/6/2012    Discussed Advance Directive planning with patient; information given to patient to review.     Alcohol dependence (H)      Alcohol dependence in remission (H) 8/2/2018     Allergy, unspecified not elsewhere classified     seasonal     Anemia, unspecified type 1/22/2021     Aortic valve prosthesis present 2/8/2021     Atrial fibrillation (H)      Benign prostatic hyperplasia 2/8/2021     Bilateral thoracic back pain 11/16/2016     BPH (benign prostatic hypertrophy)      Chronic atrial fibrillation (H) 8/7/2002     Chronic infection     low back wound incision not healing      Chronic low back pain  8/19/2011     Chronic pain     lower back and right leg and left leg     Depressive disorder 6/9/2010    Depression  NOS     Dissection of aorta, thoracic 1992    St Jose F aotic valve + arch graft 1992     Elevated prostate specific antigen (PSA) 1/22/2020     Family history of prostate cancer 1/22/2020     Generalized muscle weakness 1/22/2021     Headache(784.0)      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 Provider to review     Leg wound, left 1/9/2021     Long term current use of anticoagulant therapy 8/7/2002    LW Modifier:  Coumadin, since mechanical aortic valve LW Onset:  1992 ; Anticoagulant Rx Long Term Problem list name updated by automated process. Provider to review     Low back pain      Lumbar radiculopathy 4/3/2013     Lumbar surgical wound fluid collection 5/23/2013     Major depression      Memory difficulties 1/16/2015     Mixed hyperlipidemia      Morbid obesity (H) 4/20/2010    LW Modifier:  BMI 41.3 (Apr 2010) ; Obesity Morbid     Numbness and tingling     right leg post surg rightt hip/ also left leg since surg     OA (osteoarthritis)     hips     OAB (overactive bladder) 5/11/2015     Obesity, unspecified      Persons encountering health services in other specified circumstances 4/3/2012    Formatting of this note might be different from the original. EMERGENCY CARE PLAN Presenting Problem Signs and Symptoms Treatment Plan   Questions or conerns during clinic hours    I will call the clinic directly    Questions or conerns outside clinic hours    I will call the 24 hour nurse line at [phone]   Patient needs to schedule an appointment    I will call the 24 hour scheduling team at     [facility] 1/22/2021     Prostate infection       Radiculitis 4/20/2010    LW Modifier:  Right foot, s/p R AMANDA LW Onset:  2002 ; Neuralgia     Recurrent major depressive episodes, in full remission (H) 10/30/2012     Renal insufficiency 1/22/2021     Rotator cuff tear 1/26/2015    Rotator cuff tear, right     S/P lumbar fusion 4/5/2018     S/P [employer]. 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 Jose F's valve     APPLY WOUND VAC Left 1/26/2021    Procedure: Placement of negative pressure wound therapy 2,400 squared centimeters  ;  Surgeon: Lazaro Plascencia MD;  Location: [facility] OR     CATARACT IOL, RT/LT      rt eye only     CHOLECYSTECTOMY, LAPOROSCOPIC  8/10     CLOSE SECONDARY WOUND LOWER EXTREMITY Left 2/3/2021    Procedure:  Split Thickness Skin Graft to Leg of 18 square centimeters  Intermediate Closure of Leg of 8cm   ;  Surgeon: Lazaro Plascencia MD;  Location:  OR     COLONOSCOPY N/A 1/15/2015    Procedure: COLONOSCOPY;  Surgeon: Johnson Kothari MD;  Location:  GI     DECOMPRESSION LUMBAR ONE LEVEL  4/3/2013    Procedure: DECOMPRESSION LUMBAR ONE LEVEL;  Open Decompression L3-4 bilateral;  Surgeon: Travis Coffey MD;  Location: [facility] OR     DECOMPRESSION, FUSION CERVICAL ANTERIOR ONE LEVEL, COMBINED  3/23/2012    Procedure:COMBINED DECOMPRESSION, FUSION CERVICAL ANTERIOR ONE LEVEL; Anterior Cervical Decompression and Fusion C4-6; Surgeon:TRAVIS COFFEY; Location: OR     ELBOW SURGERY       EXPLORE SPINE, REMOVE HARDWARE, COMBINED  5/23/2013    Procedure:  COMBINED EXPLORE SPINE, REMOVE HARDWARE;  Exploration Lumbar Wound for fluid collection;  Surgeon: Travis Coffey MD;  Location: [facility] OR     FUSION CERVICAL ANTERIOR TWO LEVELS  3/26/2012    Procedure:FUSION CERVICAL ANTERIOR TWO LEVELS; Anterior Cervical Fusion C4-6, Anterior Cervical  Hematoma Evacuation; Surgeon:TRAVIS COFFEY; Location:[facility] OR     IR LUMBAR EPIDURAL STEROID INJECTION  3/28/2003     IRRIGATION AND DEBRIDEMENT LOWER EXTREMITY, COMBINED Left 1/10/2021    Procedure: 1.  Irrigation and excisional debridement to muscle left distal medial calf chronic wounds total area measuring 9 cm x 4 cm 2.  Irrigation and excisional debridement to fascia left medial calf chronic hematoma measuring 29 x 6.7 x 4.2 cm 3.  Primary complex wound closure left medial distal calf 9 cm in length;  Surgeon: Rod Kemp MD;  Location: [facility] OR     IRRIGATION AND DEBRIDEMENT LOWER EXTREMITY, COMBINED Left 1/26/2021    Procedure: Evacuation of hematoma debridement of skin, subcutaneous tissue and muscle 2,400 squared centimeters, placement of negative pressure wound therapy 2,400 squared centimeters;  Surgeon: Lazaro Plascencia MD;  Location: [facility] OR     IRRIGATION AND DEBRIDEMENT SPINE, CLOSE WOUND, COMBINED  3/26/2012    Procedure:COMBINED IRRIGATION AND DEBRIDEMENT SPINE, CLOSE WOUND; Surgeon:TRAVIS COFFEY; Location:[facility] OR     OTHER SURGICAL HISTORY      OK UNLISTED ORBIT     OTHER SURGICAL HISTORY      OK UNLISTED SPINE     OTHER SURGICAL HISTORY      OK UNLISTED NECK/THORAX     OTHER SURGICAL HISTORY      (IA) OK TOTAL HIP ARTHROPLASTY     OTHER SURGICAL HISTORY      (IA) OK NEE SCOPE MED W LAT MENISCECT WWO DEBRIBE/SHAVE ANY CO     removal of cyst of back   2.5week     TONSILLECTOMY       wisdom teeth[       [facility] NONSPECIFIC PROCEDURE  1992    repair of TAA with graft     [facility] TOTAL HIP ARTHROPLASTY  2010    Left AMANDA     [facility] TOTAL HIP ARTHROPLASTY  2002    R hip replacement comp's by nerve injury with pain down  into R leg, nadya below knee       CURRENT MEDICATIONS:  Current Outpatient Medications   Medication Sig Dispense Refill     diazepam (VALIUM) 5 MG tablet Take 1 tablet  by mouth nightly as needed for anxiety 30 tablet 0     acetaminophen (TYLENOL) 500 MG tablet Take 500-1,000 mg by mouth 2 times daily        azelastine (ASTELIN) 0.1 % nasal spray Spray 1 spray into both nostrils 2 times daily 30 mL 5     cyclobenzaprine (FLEXERIL) 10 MG tablet TAKE ONE TABLET BY MOUTH THREE TIMES DAILY AS NEEDED FOR MUSCLE SPASM. 270 tablet 3     donepezil (ARICEPT) 10 MG tablet Take 1 tablet (10 mg) by mouth At Bedtime 90 tablet 3     folic acid (FOLVITE) 1 MG tablet Take 5 tablets  by mouth daily 450 tablet 3     guanFACINE (TENEX) 1 MG tablet Take one tablet by mouth At Bedtime 90 tablet 0     levothyroxine (SYNTHROID/LEVOTHROID) 175 MCG tablet Take 1 tablet (175 mcg) by mouth daily 90 tablet 3     lisinopril (ZESTRIL) 10 MG tablet Take 1 tablet (10 mg) by mouth daily 90 tablet 3     pregabalin (LYRICA) 100 MG capsule Take 1 capsule (100 mg) by mouth 3 times daily Do not take if sleepy/sedated. 270 capsule 3     propafenone (RYTHMOL) 150 MG TABS tablet Take 1 tablet (150 mg) by mouth every 8 hours 90 tablet 0     simvastatin (ZOCOR) 40 MG tablet Take 1 tablet (40 mg) by mouth At Bedtime. Need to update cholesterol level before additional refills. 90 tablet 3     venlafaxine (EFFEXOR XR) 75 MG 24 hr capsule Take 3 capsules (225 mg) by mouth daily 270 capsule 3     warfarin ANTICOAGULANT (COUMADIN) 2.5 MG tablet Take 5 mg by mouth daily 7.5mg=Wed and Sun, 5mg=ROW       warfarin ANTICOAGULANT (COUMADIN) 5 MG tablet Take one tablet  by mouth daily or as directed by INR Clinic. 90 tablet 3       ALLERGIES:      Allergies   Allergen Reactions     Blood Transfusion Related (Informational Only) Other (See Comments)     Patient has a history of a clinically significant antibody against RBC antigens.  A delay in compatible RBCs may occur.    "    Ciprofloxacin      \"Went nuts\"     Gabapentin      Severe behavioral disturbances         FAMILY HISTORY: No pertinent family history, reviewed in EMR.    SOCIAL HISTORY:   Social History     Socioeconomic History     Marital status:      Spouse name: Not on file     Number of children: 3     Years of education: Not on file     Highest education level: High school graduate   Occupational History     Employer: DISABLED     Comment: Previous , disabled since ~2013   Tobacco Use     Smoking status: Never     Smokeless tobacco: Never   Vaping Use     Vaping Use: Never used   Substance and Sexual Activity     Alcohol use: No     Comment: Stopped drinking alcohol ~2009     Drug use: No     Sexual activity: Not Currently   Other Topics Concern     Parent/sibling w/ CABG, MI or angioplasty before 65F 55M? 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  (last updated 6/18/2021)      Social Determinants of Health     Financial Resource Strain: Low Risk  (12/7/2021)    Overall Financial Resource Strain (CARDIA)      Difficulty of Paying Living Expenses: Not hard at all   Food Insecurity: No Food Insecurity (12/7/2021)    Hunger Vital Sign      Worried About Running Out of Food in the Last Year: Never true      Ran Out of Food in the Last Year: Never true   Transportation Needs: No Transportation Needs (12/7/2021)    PRAPARE - Transportation      Lack of Transportation (Medical): No      Lack of Transportation (Non-Medical): No   Physical Activity: Inactive (12/7/2021)    Exercise Vital Sign      Days of Exercise per Week: 0 days      Minutes of Exercise per Session: 30 min   Stress: No Stress Concern Present (12/7/2021)    [employer] - Occupational Stress Questionnaire      Feeling of Stress : Not at all   Social Connections: Unknown (12/7/2021)    Social Connection and Isolation Panel [NHANES]      " Frequency of Communication with Friends and Family: More than three times a week      Frequency of Social Gatherings with Friends and Family: Patient refused      Attends Restorationism Services: Patient refused      Active Member of Clubs or Organizations: No      Attends Club or Organization Meetings: Not on file      Marital Status:    Intimate Partner Violence: Not At Risk (10/29/2019)    Humiliation, Afraid, Rape, and Kick questionnaire      Fear of Current or Ex-Partner: No      Emotionally Abused: No      Physically Abused: No      Sexually Abused: No   Housing Stability: Low Risk  (12/7/2021)    Housing Stability Vital Sign      Unable to Pay for Housing in the Last Year: No      Number of Places Lived in the Last Year: 1      Unstable Housing in the Last Year: No       REVIEW OF SYSTEMS: Positive for that noted in past medical history and history of present illness and otherwise reviewed in EMR    PHYSICAL EXAM:  Patient is Data Unavailable and weighs 0 lbs 0 oz There were no vitals taken for this visit.  There is no height or weight on file to calculate BMI.   Constitutional: Well-developed, well-nourished, healthy appearing male.  Skin: Warm, dry   HEENT: Normal  Cardiac: Well perfused extremities, strong 2+ peripheral pulses. No edema.   Pulmonary: Breathing room air    Musculoskeletal:   Left shoulder:  AROM left shoulder: 160 /120 /40/12  5/5 supraspinatus, 5/5 infraspinatus, 5/5 subscapularis  Neurovascular exam and cervical spine exam are normal.    X-RAYS:   AP, lateral, zanca, and axillary radiographs of the left shoulder were ordered and reviewed by me personally showing no acute fracture or dislocation.  Significant joint space narrowing of the glenohumeral joint with osteophyte formation difficult to assess the glenoid wear on the axillary.      ADVANCED IMAGING:     IMPRESSION: 67 year old right hand dominant male, with left shoulder osteoarthritis.     PLAN:     I did nice discussion with  Nicko today about the diagnosis and treatment plan.  We discussed that the patient has primary osteoarthritis of the left shoulder.  We talked about options including conservative versus surgical intervention.  Conservatively we discussed topical anti-inflammatories the patient is on chronic anticoagulation and cannot take oral anti-inflammatories, corticosteroid injections and nerve ablations, living with the shoulder as is..   He was interested in discussing shoulder arthroplasty.  At this point in time we do not feel that he is a candidate for arthroplasty given his BMI of 41.  We discussed that the risks of surgery including the elevated risk of infection would potentially outweigh the benefit.  Additionally has history of MRSA colonization with chronically infected wounds of the lower extremity necessitating multiple irrigation and debridement and eventually flap type procedure.  We will plan for referral to a nutritionist/dietitian to assist with weight management. We discussed that he would potentially be a candidate for total shoulder arthroplasty with a BMI of less than 40 if we can mitigate his risk of infection is much as possible.. He will follow up on an as needed basis.     I discussed with the patient the etiology of their condition. We discussed at length the options as noted above.     At the conclusion of the office visit, Nicko verbally acknowledged that I answered all of his questions satisfactorily.    Brandy Roblero MD  Orthopedic Surgery Sports Medicine and Shoulder Surgery        Again, thank you for allowing me to participate in the care of your patient.        Sincerely,        BRANDY ROBLERO MD

## 2023-07-18 DIAGNOSIS — I48.20 CHRONIC ATRIAL FIBRILLATION (H): ICD-10-CM

## 2023-07-18 NOTE — TELEPHONE ENCOUNTER
Please call.  I usually do not prescribe propafenone; it is a cardiology specialty med.  Can this please be obtained through his cardiologist?

## 2023-07-19 ENCOUNTER — LAB (OUTPATIENT)
Dept: LAB | Facility: CLINIC | Age: 68
End: 2023-07-19
Payer: COMMERCIAL

## 2023-07-19 ENCOUNTER — ANTICOAGULATION THERAPY VISIT (OUTPATIENT)
Dept: ANTICOAGULATION | Facility: CLINIC | Age: 68
End: 2023-07-19

## 2023-07-19 DIAGNOSIS — I48.0 PAF (PAROXYSMAL ATRIAL FIBRILLATION) (H): ICD-10-CM

## 2023-07-19 DIAGNOSIS — Z95.2 AORTIC VALVE PROSTHESIS PRESENT: ICD-10-CM

## 2023-07-19 DIAGNOSIS — I48.0 PAF (PAROXYSMAL ATRIAL FIBRILLATION) (H): Primary | ICD-10-CM

## 2023-07-19 LAB — INR BLD: 3.2 (ref 0.9–1.1)

## 2023-07-19 PROCEDURE — 85610 PROTHROMBIN TIME: CPT

## 2023-07-19 PROCEDURE — 36416 COLLJ CAPILLARY BLOOD SPEC: CPT

## 2023-07-19 NOTE — PROGRESS NOTES
ANTICOAGULATION MANAGEMENT     Todd S Aschoff 67 year old male is on warfarin with therapeutic INR result. (Goal INR 2.5-3.5)    Recent labs: (last 7 days)     07/19/23  1058   INR 3.2*         ASSESSMENT       Source(s): Chart Review    Previous INR was Supratherapeutic    Medication, diet, health changes since last INR       Orthopedic office visit on 7/5/23--arthritis of left shoulder, not a candidate for surgery due to BMI > 40.           PLAN     Unable to reach Nicko today.    Left detailed message to inform patient to continue same weekly Warfarin dose and to call [facility] with any questions, concerns or changes.  Also to schedule next INR in 3 weeks      Angely Smalls, RN  Anticoagulation Clinic  7/19/2023

## 2023-07-26 NOTE — TELEPHONE ENCOUNTER
Received call from patient. Patient states he does not currently have a cardiologist. Patient is awaiting a call from Cardiology to schedule his first appointment. Patient is out of this medication, has been since last Thursday.     Shreyas GARCIA RN 7/26/2023 at 3:13 PM

## 2023-07-27 DIAGNOSIS — M62.830 BACK MUSCLE SPASM: ICD-10-CM

## 2023-07-27 RX ORDER — PROPAFENONE HYDROCHLORIDE 150 MG/1
TABLET, COATED ORAL
Qty: 90 TABLET | Refills: 2 | Status: SHIPPED | OUTPATIENT
Start: 2023-07-27 | End: 2023-07-28

## 2023-07-27 RX ORDER — DIAZEPAM 5 MG
TABLET ORAL
Qty: 30 TABLET | Refills: 0 | Status: SHIPPED | OUTPATIENT
Start: 2023-07-27 | End: 2023-08-24

## 2023-07-27 NOTE — TELEPHONE ENCOUNTER
Patient has been called and given message from Dr. Morgan. He said he already has an appt set up with cardiology on 9/1/23.

## 2023-07-27 NOTE — TELEPHONE ENCOUNTER
Can you please call patient back and help him set up an appointment with cardiology.  He is not very reliable.    I refilled for 3 months.    Herve Morgan MD  Internal Medicine and Pediatrics

## 2023-07-28 DIAGNOSIS — I48.20 CHRONIC ATRIAL FIBRILLATION (H): ICD-10-CM

## 2023-07-28 RX ORDER — PROPAFENONE HYDROCHLORIDE 150 MG/1
TABLET, COATED ORAL
Qty: 90 TABLET | Refills: 2 | Status: SHIPPED | OUTPATIENT
Start: 2023-07-28 | End: 2023-09-01

## 2023-07-28 NOTE — TELEPHONE ENCOUNTER
Patient calling again.  States Rythmol RX not received by pharmacy.  Has not had it since last Thursday.  States pharmacy told him the system was down yesterday and they did not get RX sent 7/27/23.  Routed high priority.  Ivett Whelan RN

## 2023-07-28 NOTE — TELEPHONE ENCOUNTER
Patient calling and states he spoke to someone yesterday.  See below.  States he needs RX refilled.  Advised RX was refilled.  See below.  He will check back with pharmacy.  Ivett Whelan RN    propafenone (RYTHMOL) 150 MG TABS tablet 90 tablet 2 7/27/2023  No   Sig: Take 1 tablet by mouth every 8 hours   Sent to pharmacy as: Propafenone HCl 150 MG Oral Tablet (RYTHMOL)   Class: E-Prescribe   Order: [id number]   E-Prescribing Status: Receipt confirmed by pharmacy (7/27/2023 10:37 AM CDT)   Renewals    Renewal provider: Patti Garcia PA-C        Pharmacy    [id number] - [place], [place] - [address]

## 2023-08-08 DIAGNOSIS — E03.9 ACQUIRED HYPOTHYROIDISM: ICD-10-CM

## 2023-08-08 NOTE — TELEPHONE ENCOUNTER
Reason for call:  Medication   If this is a refill request, has the caller requested the refill from the pharmacy already? Yes  Will the patient be using a [facility]? No  Name of the pharmacy and phone number for the current request: [facility]  [facility]  Pharmacy Fax was down - working now    Name of the medication requested: levothyroxine. 150 mg once daily    Other request: none    Phone number to reach patient:  Cell number on file:    Telephone Information:   Mobile [phone]       Best Time:  Anytime    Can we leave a detailed message on this number?  YES    Travel screening: Not Applicable

## 2023-08-15 RX ORDER — LEVOTHYROXINE SODIUM 175 UG/1
175 TABLET ORAL DAILY
Qty: 90 TABLET | Refills: 3 | Status: SHIPPED | OUTPATIENT
Start: 2023-08-15 | End: 2024-04-25

## 2023-08-18 ENCOUNTER — TELEPHONE (OUTPATIENT)
Dept: ANTICOAGULATION | Facility: CLINIC | Age: 68
End: 2023-08-18
Payer: COMMERCIAL

## 2023-08-18 NOTE — TELEPHONE ENCOUNTER
ANTICOAGULATION     Nicko S Aschoff is overdue for INR check.      Left message for patient to call and schedule lab appointment as soon as possible. If returning call, please schedule.     Amanda Wheeler RN

## 2023-08-24 DIAGNOSIS — M62.830 BACK MUSCLE SPASM: ICD-10-CM

## 2023-08-24 RX ORDER — DIAZEPAM 5 MG
TABLET ORAL
Qty: 30 TABLET | Refills: 0 | Status: SHIPPED | OUTPATIENT
Start: 2023-08-24 | End: 2023-09-15

## 2023-08-29 ENCOUNTER — ANTICOAGULATION THERAPY VISIT (OUTPATIENT)
Dept: ANTICOAGULATION | Facility: CLINIC | Age: 68
End: 2023-08-29

## 2023-08-29 ENCOUNTER — TELEPHONE (OUTPATIENT)
Dept: ANTICOAGULATION | Facility: CLINIC | Age: 68
End: 2023-08-29
Payer: COMMERCIAL

## 2023-08-29 ENCOUNTER — LAB (OUTPATIENT)
Dept: LAB | Facility: CLINIC | Age: 68
End: 2023-08-29
Payer: COMMERCIAL

## 2023-08-29 DIAGNOSIS — Z95.2 AORTIC VALVE PROSTHESIS PRESENT: ICD-10-CM

## 2023-08-29 DIAGNOSIS — I48.0 PAF (PAROXYSMAL ATRIAL FIBRILLATION) (H): Primary | ICD-10-CM

## 2023-08-29 DIAGNOSIS — I48.0 PAF (PAROXYSMAL ATRIAL FIBRILLATION) (H): ICD-10-CM

## 2023-08-29 LAB — INR BLD: 3.1 (ref 0.9–1.1)

## 2023-08-29 PROCEDURE — 85610 PROTHROMBIN TIME: CPT

## 2023-08-29 PROCEDURE — 36416 COLLJ CAPILLARY BLOOD SPEC: CPT

## 2023-08-29 NOTE — TELEPHONE ENCOUNTER
ANTICOAGULATION     Nicko S Aschoff is overdue for INR check.      Left message for patient to call and schedule lab appointment as soon as possible. If returning call, please schedule.     Angely Smalls RN

## 2023-08-29 NOTE — PROGRESS NOTES
ANTICOAGULATION MANAGEMENT     Todd S Aschoff 67 year old male is on warfarin with supratherapeutic INR result. (Goal INR 2.5-3.5)    Recent labs: (last 7 days)     08/29/23  1150   INR 3.1*       ASSESSMENT     Source(s): Chart Review  Previous INR was Therapeutic last visit; previously outside of goal range  Medication, diet, health changes since last INR chart reviewed; none identified         PLAN     Recommended plan for no diet, medication or health factor changes affecting INR     Dosing Instructions: Continue your current warfarin dose with next INR in 4 weeks       Summary  As of 8/29/2023      Full warfarin instructions:  7.5 mg every Tue, Fri; 5 mg all other days   Next INR check:  9/26/2023               Detailed voice message left for Nicko with dosing instructions and follow up date.     Contact [phone]  to schedule and with any changes, questions or concerns.     Education provided:   Please call back if any changes to your diet, medications or how you've been taking warfarin    Plan made per [facility] anticoagulation protocol    Amanda Wheeler RN  Anticoagulation Clinic  8/29/2023    _______________________________________________________________________     Anticoagulation Episode Summary       Current INR goal:  2.5-3.5   TTR:  64.4 % (1 y)   Target end date:  Indefinite   Send INR reminders to:  [facility]    Indications    PAF (paroxysmal atrial fibrillation) (H) [I48.0]  Mechanical AV Replacement 1992 -- on Warfarin  [Z95.2]             Comments:               Anticoagulation Care Providers       Provider Role Specialty Phone number    Obdulio Ingram MD Referring Internal Medicine [phone]    Herve Morgan MD Referring Internal Medicine - Pediatrics [phone]

## 2023-09-01 ENCOUNTER — TELEPHONE (OUTPATIENT)
Dept: CARDIOLOGY | Facility: CLINIC | Age: 68
End: 2023-09-01

## 2023-09-01 ENCOUNTER — OFFICE VISIT (OUTPATIENT)
Dept: CARDIOLOGY | Facility: CLINIC | Age: 68
End: 2023-09-01
Attending: INTERNAL MEDICINE
Payer: COMMERCIAL

## 2023-09-01 VITALS
HEIGHT: 74 IN | SYSTOLIC BLOOD PRESSURE: 119 MMHG | HEART RATE: 88 BPM | BODY MASS INDEX: 40.43 KG/M2 | DIASTOLIC BLOOD PRESSURE: 81 MMHG | WEIGHT: 315 LBS

## 2023-09-01 DIAGNOSIS — Z95.2 S/P AVR (AORTIC VALVE REPLACEMENT): Primary | ICD-10-CM

## 2023-09-01 DIAGNOSIS — I48.0 PAF (PAROXYSMAL ATRIAL FIBRILLATION) (H): ICD-10-CM

## 2023-09-01 DIAGNOSIS — I48.20 CHRONIC ATRIAL FIBRILLATION (H): ICD-10-CM

## 2023-09-01 PROCEDURE — 99204 OFFICE O/P NEW MOD 45 MIN: CPT | Performed by: INTERNAL MEDICINE

## 2023-09-01 PROCEDURE — 93000 ELECTROCARDIOGRAM COMPLETE: CPT | Performed by: INTERNAL MEDICINE

## 2023-09-01 RX ORDER — PROPAFENONE HYDROCHLORIDE 150 MG/1
150 TABLET, COATED ORAL EVERY 8 HOURS
Qty: 270 TABLET | Refills: 4 | Status: SHIPPED | OUTPATIENT
Start: 2023-09-01

## 2023-09-01 NOTE — LETTER
9/1/2023    Herve Morgan MD  [address] [place] [address]    RE: Todd S Aschoff       Dear Colleague,     I had the pleasure of seeing Todd S Aschoff in the [facility].  HPI and Plan:   Todd S Aschoff is a 67 year old male who presents with history of thoracic aortic dissection repair and mechanical aortic valve replacement in 1992, paroxysmal atrial fibrillation, hypertension, hyperlipidemia, CVA and morbid obesity.  He used to follow Dr. Apodaca up until 1994 and since has been followed only by primary care.  He has been on propafenone for decades for suppression of paroxysmal atrial fibrillation.  He was referred to our office to manage continued treatment of his atrial fibrillation.  He has no health complaints today, he has very rare palpitations or irregular heartbeat as long as he remembers to take his propafenone.  He has no side effects from this medication.  He denies any presyncope or syncopal events.  He denies any difficulty breathing, chest pains or exercise intolerance.  He tells me he was recently placed on lisinopril for elevated blood pressure.  His last assessment of his valve with echocardiogram was done in April 2021 at that time his LV function was normal with EF of 55 to 60%, normal valve gradients and normal right heart function.  We did perform an EKG today which demonstrates a sinus rhythm without ST or T wave abnormality.  QT corrected interval is 391 ms    Summary    1.  Paroxysmal atrial fibrillation-he has been maintained on propafenone 150 mg 3 times a day for decades now with symptomatic improvement and suppression of his atrial fibrillation.  He has normal QT interval on EKG today.  He is on warfarin for both his mechanical valve and CVA prophylaxis.  I renewed his prescription for propafenone today, and we will recommend annual follow-up visit with EKG.    2.  Thoracic aortic dissection repair with mechanical aortic valve replacement-we will  recommend an echocardiogram for follow-up to assess valve function and aneurysm.  Please feel free to contact me with any questions you have in regards to his care           Today's clinic visit entailed:  Review of external notes as documented elsewhere in note  Ordering of each unique test  Prescription drug management    Provider  Link to [facility] Help Grid     The level of medical decision making during this visit was of moderate complexity.    Orders Placed This Encounter   Procedures    EKG 12-lead complete w/read - Clinics (performed today)    Echocardiogram Complete     Orders Placed This Encounter   Medications    propafenone (RYTHMOL) 150 MG TABS tablet     Sig: Take 1 tablet (150 mg) by mouth every 8 hours     Dispense:  270 tablet     Refill:  4     Medications Discontinued During This Encounter   Medication Reason    propafenone (RYTHMOL) 150 MG TABS tablet Reorder (No AVS)         Encounter Diagnoses   Name Primary?    PAF (paroxysmal atrial fibrillation) (H)     Chronic atrial fibrillation (H)     S/P AVR (aortic valve replacement) Yes       CURRENT MEDICATIONS:  Current Outpatient Medications   Medication Sig Dispense Refill    acetaminophen (TYLENOL) 500 MG tablet Take 500-1,000 mg by mouth 2 times daily       azelastine (ASTELIN) 0.1 % nasal spray Spray 1 spray into both nostrils 2 times daily 30 mL 5    cyclobenzaprine (FLEXERIL) 10 MG tablet TAKE ONE TABLET BY MOUTH THREE TIMES DAILY AS NEEDED FOR MUSCLE SPASM. 270 tablet 3    diazepam (VALIUM) 5 MG tablet Take 1 tablet  by mouth nightly as needed for anxiety 30 tablet 0    donepezil (ARICEPT) 10 MG tablet Take 1 tablet (10 mg) by mouth At Bedtime 90 tablet 3    folic acid (FOLVITE) 1 MG tablet Take 5 tablets  by mouth daily 450 tablet 3    guanFACINE (TENEX) 1 MG tablet Take one tablet by mouth At Bedtime 90 tablet 0    levothyroxine (SYNTHROID/LEVOTHROID) 175 MCG tablet Take 1 tablet (175 mcg) by mouth daily 90 tablet 3    lisinopril (ZESTRIL) 10  "MG tablet Take 1 tablet (10 mg) by mouth daily 90 tablet 3    pregabalin (LYRICA) 100 MG capsule Take 1 capsule (100 mg) by mouth 3 times daily Do not take if sleepy/sedated. 270 capsule 3    propafenone (RYTHMOL) 150 MG TABS tablet Take 1 tablet (150 mg) by mouth every 8 hours 270 tablet 4    simvastatin (ZOCOR) 40 MG tablet Take 1 tablet (40 mg) by mouth At Bedtime. Need to update cholesterol level before additional refills. 90 tablet 3    venlafaxine (EFFEXOR XR) 75 MG 24 hr capsule Take 3 capsules (225 mg) by mouth daily 270 capsule 3    warfarin ANTICOAGULANT (COUMADIN) 2.5 MG tablet Take 5 mg by mouth daily 7.5mg=Wed and Sun, 5mg=ROW      warfarin ANTICOAGULANT (COUMADIN) 5 MG tablet Take one tablet  by mouth daily or as directed by INR Clinic. 90 tablet 3       ALLERGIES     Allergies   Allergen Reactions    Blood Transfusion Related (Informational Only) Other (See Comments)     Patient has a history of a clinically significant antibody against RBC antigens.  A delay in compatible RBCs may occur.      Ciprofloxacin      \"Went nuts\"    Gabapentin      Severe behavioral disturbances       PAST MEDICAL HISTORY:  Past Medical History:   Diagnosis Date    Advanced directives, counseling/discussion 1/6/2012    Discussed Advance Directive planning with patient; information given to patient to review.    Alcohol dependence (H)     Alcohol dependence in remission (H) 8/2/2018    Allergy, unspecified not elsewhere classified     seasonal    Anemia, unspecified type 1/22/2021    Aortic valve prosthesis present 2/8/2021    Atrial fibrillation (H)     Benign prostatic hyperplasia 2/8/2021    Bilateral thoracic back pain 11/16/2016    BPH (benign prostatic hypertrophy)     Chronic atrial fibrillation (H) 8/7/2002    Chronic infection     low back wound incision not healing     Chronic low back pain 8/19/2011    Chronic pain     lower back and right leg and left leg    Depressive disorder 6/9/2010    Depression  NOS    " Dissection of aorta, thoracic (H) 1992    St Jose F aotic valve + arch graft 1992    Elevated prostate specific antigen (PSA) 1/22/2020    Family history of prostate cancer 1/22/2020    Generalized muscle weakness 1/22/2021    Headache(784.0)     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 Provider to review    Leg wound, left 1/9/2021    Long term current use of anticoagulant therapy 8/7/2002    LW Modifier:  Coumadin, since mechanical aortic valve LW Onset:  1992 ; Anticoagulant Rx Long Term Problem list name updated by automated process. Provider to review    Low back pain     Lumbar radiculopathy 4/3/2013    Lumbar surgical wound fluid collection 5/23/2013    Major depression     Memory difficulties 1/16/2015    Mixed hyperlipidemia     Morbid obesity (H) 4/20/2010    LW Modifier:  BMI 41.3 (Apr 2010) ; Obesity Morbid    Numbness and tingling     right leg post surg rightt hip/ also left leg since surg    OA (osteoarthritis)     hips    OAB (overactive bladder) 5/11/2015    Obesity, unspecified     Persons encountering health services in other specified circumstances 4/3/2012    Formatting of this note might be different from the original. EMERGENCY CARE PLAN Presenting Problem Signs and Symptoms Treatment Plan   Questions or conerns during clinic hours    I will call the clinic directly    Questions or conerns outside clinic hours    I will call the 24 hour nurse line at [phone]   Patient needs to schedule an appointment    [facility]    Prostate infection     Radiculitis 4/20/2010    LW Modifier:  Right foot, s/p R AMANDA LW Onset:  2002 ; Neuralgia    Recurrent major depressive episodes, in full  remission (H) 10/30/2012    Renal insufficiency 1/22/2021    Rotator cuff tear 1/26/2015    Rotator cuff tear, right    S/P lumbar fusion 4/5/2018    S/P [employer]. 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 Jose F's valve    APPLY WOUND VAC Left 1/26/2021    Procedure: Placement of negative pressure wound therapy 2,400 squared centimeters  ;  Surgeon: Lazaro Plascencia MD;  Location: [facility] OR    CATARACT IOL, RT/LT      rt eye only    CHOLECYSTECTOMY, LAPOROSCOPIC  8/10    CLOSE SECONDARY WOUND LOWER EXTREMITY Left 2/3/2021    Procedure:  Split Thickness Skin Graft to Leg of 18 square centimeters  Intermediate Closure of Leg of 8cm   ;  Surgeon: Lazaro Plascencia MD;  Location: [facility] OR    COLONOSCOPY N/A 1/15/2015    Procedure: COLONOSCOPY;  Surgeon: Johnson Kothari MD;  Location:  GI    DECOMPRESSION LUMBAR ONE LEVEL  4/3/2013    Procedure: DECOMPRESSION LUMBAR ONE LEVEL;  Open Decompression L3-4 bilateral;  Surgeon: Travis Coffey MD;  Location: [facility] OR    DECOMPRESSION, FUSION CERVICAL ANTERIOR ONE LEVEL, COMBINED  3/23/2012    Procedure:COMBINED DECOMPRESSION, FUSION CERVICAL ANTERIOR ONE LEVEL; Anterior Cervical Decompression and Fusion C4-6; Surgeon:TRAVIS COFFEY; Location:[facility] OR    ELBOW SURGERY      EXPLORE SPINE, REMOVE HARDWARE, COMBINED  5/23/2013    Procedure: COMBINED EXPLORE SPINE, REMOVE HARDWARE;  Exploration Lumbar Wound for fluid collection;  Surgeon: Travis Coffey MD;  Location: [facility] OR    FUSION CERVICAL ANTERIOR  TWO LEVELS  3/26/2012    Procedure:FUSION CERVICAL ANTERIOR TWO LEVELS; Anterior Cervical Fusion C4-6, Anterior Cervical  Hematoma Evacuation; Surgeon:TRAVIS COFFEY; Location:[facility] OR    IR LUMBAR EPIDURAL STEROID INJECTION  3/28/2003    IRRIGATION AND DEBRIDEMENT LOWER EXTREMITY, COMBINED Left 1/10/2021    Procedure: 1.  Irrigation and excisional debridement to muscle left distal medial calf chronic wounds total area measuring 9 cm x 4 cm 2.  Irrigation and excisional debridement to fascia left medial calf chronic hematoma measuring 29 x 6.7 x 4.2 cm 3.  Primary complex wound closure left medial distal calf 9 cm in length;  Surgeon: Rod Kemp MD;  Location: [facility] OR    IRRIGATION AND DEBRIDEMENT LOWER EXTREMITY, COMBINED Left 2021    Procedure: Evacuation of hematoma debridement of skin, subcutaneous tissue and muscle 2,400 squared centimeters, placement of negative pressure wound therapy 2,400 squared centimeters;  Surgeon: Lazaro Plascencia MD;  Location: [facility] OR    IRRIGATION AND DEBRIDEMENT SPINE, CLOSE WOUND, COMBINED  3/26/2012    Procedure:COMBINED IRRIGATION AND DEBRIDEMENT SPINE, CLOSE WOUND; Surgeon:TRAVIS COFFEY; Location:[facility] OR    OTHER SURGICAL HISTORY      [place] UNLISTED ORBIT    OTHER SURGICAL HISTORY      [place] UNLISTED SPINE    OTHER SURGICAL HISTORY      [place] UNLISTED NECK/THORAX    OTHER SURGICAL HISTORY      (IA) [place] TOTAL HIP ARTHROPLASTY    OTHER SURGICAL HISTORY      (IA) [place] NEE SCOPE MED W LAT MENISCECT WWO DEBRIBE/SHAVE ANY CO    removal of cyst of back   2.5week    TONSILLECTOMY      wisdom teeth[      [facility] NONSPECIFIC PROCEDURE  1992    repair of TAA with graft    [facility] TOTAL HIP ARTHROPLASTY      Left AMANDA    [facility] TOTAL HIP ARTHROPLASTY      R hip replacement comp's by nerve injury with pain down into R leg, nadya below knee       FAMILY HISTORY:  Family History   Problem Relation Age of Onset    Cerebrovascular Disease Father          age 86, had M.I. ,diabetic also     Prostate Cancer Father     Diabetes Father     Cancer Mother          age 83 of dementia, also h/o lymphoma    Diabetes Mother     Hypertension Brother         2 brothers with hypertension & sleep apnea    Hypertension Sister         Born ~1936    Sleep Apnea Brother          age 78, Alzheimers    No Known Problems Son     No Known Problems Daughter     No Known Problems Son     Family History Negative Brother         Had 5 brothers, three still alive as of 2019    Colon Cancer No family hx of        SOCIAL HISTORY:  Social History     Socioeconomic History    Marital status:      Spouse name: None    Number of children: 3    Years of education: None    Highest education level: High school graduate   Occupational History     Employer: DISABLED     Comment: Previous , disabled since ~   Tobacco Use    Smoking status: Never    Smokeless tobacco: Never   Vaping Use    Vaping Use: Never used   Substance and Sexual Activity    Alcohol use: No     Comment: Stopped drinking alcohol ~    Drug use: No    Sexual activity: Not Currently   Social History Narrative    , 3 children, retired (has worked multiple jobs, 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  (last updated 2021)      Social Determinants of Health     Financial Resource Strain: Low Risk  (2021)    Overall Financial Resource Strain (CARDIA)     Difficulty of Paying Living Expenses: Not hard at all   Food Insecurity: No Food Insecurity (2021)    Hunger Vital Sign     Worried About Running Out of Food in the Last Year: Never true     Ran Out of Food in the Last Year: Never true   Transportation Needs: No Transportation Needs (2021)    PRAPARE - Transportation     Lack of Transportation (Medical): No     Lack of Transportation (Non-Medical): No   Physical Activity: Inactive (2021)    Exercise Vital Sign     Days of Exercise per Week: 0 days     Minutes of Exercise per Session: 30 min   Stress: No Stress Concern  "Present (12/7/2021)    [place] [employer] - Occupational Stress Questionnaire     Feeling of Stress : Not at all   Social Connections: Unknown (12/7/2021)    Social Connection and Isolation Panel [NHANES]     Frequency of Communication with Friends and Family: More than three times a week     Frequency of Social Gatherings with Friends and Family: Patient refused     Attends Adventism Services: Patient refused     Active Member of Clubs or Organizations: No     Marital Status:    Intimate Partner Violence: Not At Risk (10/29/2019)    Humiliation, Afraid, Rape, and Kick questionnaire     Fear of Current or Ex-Partner: No     Emotionally Abused: No     Physically Abused: No     Sexually Abused: No   Housing Stability: Low Risk  (12/7/2021)    Housing Stability Vital Sign     Unable to Pay for Housing in the Last Year: No     Number of Places Lived in the Last Year: 1     Unstable Housing in the Last Year: No       Review of Systems:  Skin:        Eyes:       ENT:       Respiratory:  Negative    Cardiovascular:  Negative;palpitations;chest pain;syncope or near-syncope;dizziness;cyanosis;edema lightheadedness;Positive for;fatigue  Gastroenterology:      Genitourinary:       Musculoskeletal:  Positive for back pain  Neurologic:  Positive for stroke  Psychiatric:       Heme/Lymph/Imm:       Endocrine:  Positive for thyroid disorder    Physical Exam:  Vitals: /81   Pulse 88   Ht 1.88 m (6' 2\")   Wt 144.7 kg (319 lb 1.6 oz)   BMI 40.97 kg/m      Constitutional:  cooperative;in no acute distress obese      Skin:  warm and dry to the touch          Head:  no masses or lesions        Eyes:  pupils equal and round        Lymph:      ENT:  no pallor or cyanosis        Neck:  no carotid bruit        Respiratory:  clear to auscultation         Cardiac: regular rhythm occasional premature beats   crisp prosthetic valve sounds systolic ejection murmur          pulses below the femoral arteries " are diminished                                      GI:  abdomen soft        Extremities and Muscular Skeletal:  no edema;no deformities, clubbing, cyanosis, erythema observed              Neurological:  no gross motor deficits;affect appropriate        Psych:  Alert and Oriented x 3        Recent Lab Results:  LIPID RESULTS:  Lab Results   Component Value Date    CHOL 254 (H) 04/18/2021    HDL 43 04/18/2021     (H) 04/18/2021    TRIG 233 (H) 04/18/2021    CHOLHDLRATIO 4.0 12/24/2014       LIVER ENZYME RESULTS:  Lab Results   Component Value Date    AST 24 09/02/2022    AST 21 04/21/2021    ALT 28 09/02/2022    ALT 31 04/21/2021       CBC RESULTS:  Lab Results   Component Value Date    WBC 6.3 09/02/2022    WBC 6.5 06/18/2021    RBC 5.25 09/02/2022    RBC 4.69 06/18/2021    HGB 17.2 09/02/2022    HGB 13.9 06/18/2021    HCT 49.6 09/02/2022    HCT 42.2 06/18/2021    MCV 95 09/02/2022    MCV 90 06/18/2021    MCH 32.8 09/02/2022    MCH 29.6 06/18/2021    MCHC 34.7 09/02/2022    MCHC 32.9 06/18/2021    RDW 12.3 09/02/2022    RDW 14.9 06/18/2021     09/02/2022     06/18/2021       BMP RESULTS:  Lab Results   Component Value Date     03/14/2023     06/18/2021    POTASSIUM 4.4 03/14/2023    POTASSIUM 4.3 09/02/2022    POTASSIUM 4.1 06/18/2021    CHLORIDE 103 03/14/2023    CHLORIDE 101 09/02/2022    CHLORIDE 108 06/18/2021    CO2 22 03/14/2023    CO2 25 09/02/2022    CO2 29 06/18/2021    ANIONGAP 14 03/14/2023    ANIONGAP 11 09/02/2022    ANIONGAP 3 06/18/2021     (H) 03/14/2023    GLC 91 09/02/2022    GLC 90 06/18/2021    BUN 17.4 03/14/2023    BUN 19 09/02/2022    BUN 18 06/18/2021    CR 1.02 03/14/2023    CR 1.02 06/18/2021    GFRESTIMATED 81 03/14/2023    GFRESTIMATED >60 01/18/2022    GFRESTIMATED 77 06/18/2021    GFRESTBLACK 89 06/18/2021    CATERINA 9.4 03/14/2023    CATERINA 9.0 06/18/2021        A1C RESULTS:  Lab Results   Component Value Date    A1C 4.9 01/23/2021       INR  RESULTS:  Lab Results   Component Value Date    INR 3.1 (H) 08/29/2023    INR 3.2 (H) 07/19/2023    INR 2.78 (H) 03/21/2022    INR 3.83 (H) 01/26/2022    INR 3.1 (A) 09/14/2021    INR 3.9 (A) 09/07/2021    INR 3.0 07/16/2021    INR 2.8 (A) 07/09/2021           CC  Herve Morgan MD  [address] [place] [address]    Thank you for allowing me to participate in the care of your patient.      Sincerely,     Raquel Miles DO     [facility]

## 2023-09-01 NOTE — PROGRESS NOTES
HPI and Plan:   Todd S Aschoff is a 67 year old male who presents with history of thoracic aortic dissection repair and mechanical aortic valve replacement in 1992, paroxysmal atrial fibrillation, hypertension, hyperlipidemia, CVA and morbid obesity.  He used to follow Dr. Apodaca up until 1994 and since has been followed only by primary care.  He has been on propafenone for decades for suppression of paroxysmal atrial fibrillation.  He was referred to our office to manage continued treatment of his atrial fibrillation.  He has no health complaints today, he has very rare palpitations or irregular heartbeat as long as he remembers to take his propafenone.  He has no side effects from this medication.  He denies any presyncope or syncopal events.  He denies any difficulty breathing, chest pains or exercise intolerance.  He tells me he was recently placed on lisinopril for elevated blood pressure.  His last assessment of his valve with echocardiogram was done in April 2021 at that time his LV function was normal with EF of 55 to 60%, normal valve gradients and normal right heart function.  We did perform an EKG today which demonstrates a sinus rhythm without ST or T wave abnormality.  QT corrected interval is 391 ms    Summary    1.  Paroxysmal atrial fibrillation-he has been maintained on propafenone 150 mg 3 times a day for decades now with symptomatic improvement and suppression of his atrial fibrillation.  He has normal QT interval on EKG today.  He is on warfarin for both his mechanical valve and CVA prophylaxis.  I renewed his prescription for propafenone today, and we will recommend annual follow-up visit with EKG.    2.  Thoracic aortic dissection repair with mechanical aortic valve replacement-we will recommend an echocardiogram for follow-up to assess valve function and aneurysm.  Please feel free to contact me with any questions you have in regards to his care           Today's clinic visit  entailed:  Review of external notes as documented elsewhere in note  Ordering of each unique test  Prescription drug management    Provider  Link to MDM Help Grid     The level of medical decision making during this visit was of moderate complexity.    Orders Placed This Encounter   Procedures    EKG 12-lead complete w/read - Clinics (performed today)    Echocardiogram Complete     Orders Placed This Encounter   Medications    propafenone (RYTHMOL) 150 MG TABS tablet     Sig: Take 1 tablet (150 mg) by mouth every 8 hours     Dispense:  270 tablet     Refill:  4     Medications Discontinued During This Encounter   Medication Reason    propafenone (RYTHMOL) 150 MG TABS tablet Reorder (No AVS)         Encounter Diagnoses   Name Primary?    PAF (paroxysmal atrial fibrillation) (H)     Chronic atrial fibrillation (H)     S/P AVR (aortic valve replacement) Yes       CURRENT MEDICATIONS:  Current Outpatient Medications   Medication Sig Dispense Refill    acetaminophen (TYLENOL) 500 MG tablet Take 500-1,000 mg by mouth 2 times daily       azelastine (ASTELIN) 0.1 % nasal spray Spray 1 spray into both nostrils 2 times daily 30 mL 5    cyclobenzaprine (FLEXERIL) 10 MG tablet TAKE ONE TABLET BY MOUTH THREE TIMES DAILY AS NEEDED FOR MUSCLE SPASM. 270 tablet 3    diazepam (VALIUM) 5 MG tablet Take 1 tablet  by mouth nightly as needed for anxiety 30 tablet 0    donepezil (ARICEPT) 10 MG tablet Take 1 tablet (10 mg) by mouth At Bedtime 90 tablet 3    folic acid (FOLVITE) 1 MG tablet Take 5 tablets  by mouth daily 450 tablet 3    guanFACINE (TENEX) 1 MG tablet Take one tablet by mouth At Bedtime 90 tablet 0    levothyroxine (SYNTHROID/LEVOTHROID) 175 MCG tablet Take 1 tablet (175 mcg) by mouth daily 90 tablet 3    lisinopril (ZESTRIL) 10 MG tablet Take 1 tablet (10 mg) by mouth daily 90 tablet 3    pregabalin (LYRICA) 100 MG capsule Take 1 capsule (100 mg) by mouth 3 times daily Do not take if sleepy/sedated. 270 capsule 3     "propafenone (RYTHMOL) 150 MG TABS tablet Take 1 tablet (150 mg) by mouth every 8 hours 270 tablet 4    simvastatin (ZOCOR) 40 MG tablet Take 1 tablet (40 mg) by mouth At Bedtime. Need to update cholesterol level before additional refills. 90 tablet 3    venlafaxine (EFFEXOR XR) 75 MG 24 hr capsule Take 3 capsules (225 mg) by mouth daily 270 capsule 3    warfarin ANTICOAGULANT (COUMADIN) 2.5 MG tablet Take 5 mg by mouth daily 7.5mg=Wed and Sun, 5mg=ROW      warfarin ANTICOAGULANT (COUMADIN) 5 MG tablet Take one tablet  by mouth daily or as directed by INR Clinic. 90 tablet 3       ALLERGIES     Allergies   Allergen Reactions    Blood Transfusion Related (Informational Only) Other (See Comments)     Patient has a history of a clinically significant antibody against RBC antigens.  A delay in compatible RBCs may occur.      Ciprofloxacin      \"Went nuts\"    Gabapentin      Severe behavioral disturbances       PAST MEDICAL HISTORY:  Past Medical History:   Diagnosis Date    Advanced directives, counseling/discussion 1/6/2012    Discussed Advance Directive planning with patient; information given to patient to review.    Alcohol dependence (H)     Alcohol dependence in remission (H) 8/2/2018    Allergy, unspecified not elsewhere classified     seasonal    Anemia, unspecified type 1/22/2021    Aortic valve prosthesis present 2/8/2021    Atrial fibrillation (H)     Benign prostatic hyperplasia 2/8/2021    Bilateral thoracic back pain 11/16/2016    BPH (benign prostatic hypertrophy)     Chronic atrial fibrillation (H) 8/7/2002    Chronic infection     low back wound incision not healing     Chronic low back pain 8/19/2011    Chronic pain     lower back and right leg and left leg    Depressive disorder 6/9/2010    Depression  NOS    Dissection of aorta, thoracic (H) 1992    St Jose F aotic valve + arch graft 1992    Elevated prostate specific antigen (PSA) 1/22/2020    Family history of prostate cancer 1/22/2020    Generalized " muscle weakness 1/22/2021    Headache(784.0)     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 Provider to review    Leg wound, left 1/9/2021    Long term current use of anticoagulant therapy 8/7/2002    LW Modifier:  Coumadin, since mechanical aortic valve LW Onset:  1992 ; Anticoagulant Rx Long Term Problem list name updated by automated process. Provider to review    Low back pain     Lumbar radiculopathy 4/3/2013    Lumbar surgical wound fluid collection 5/23/2013    Major depression     Memory difficulties 1/16/2015    Mixed hyperlipidemia     Morbid obesity (H) 4/20/2010    LW Modifier:  BMI 41.3 (Apr 2010) ; Obesity Morbid    Numbness and tingling     right leg post surg rightt hip/ also left leg since surg    OA (osteoarthritis)     hips    OAB (overactive bladder) 5/11/2015    Obesity, unspecified     Persons encountering health services in other specified circumstances 4/3/2012    Formatting of this note might be different from the original. EMERGENCY CARE PLAN Presenting Problem Signs and Symptoms Treatment Plan   Questions or conerns during clinic hours    I will call the clinic directly    Questions or conerns outside clinic hours    I will call the 24 hour nurse line at [phone]   Patient needs to schedule an appointment    [facility]    Prostate infection     Radiculitis 4/20/2010    LW Modifier:  Right foot, s/p R AMANDA LW Onset:  2002 ; Neuralgia    Recurrent major depressive episodes, in full remission (H) 10/30/2012    Renal insufficiency 1/22/2021    Rotator cuff tear 1/26/2015    Rotator cuff tear, right    S/P lumbar fusion 4/5/2018    S/P [employer] 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 Jose F's valve    APPLY WOUND VAC Left 1/26/2021    Procedure: Placement of negative pressure wound therapy 2,400 squared centimeters  ;  Surgeon: Lazaro Plascencia MD;  Location: [facility] OR    CATARACT IOL, RT/LT      rt eye only    CHOLECYSTECTOMY, LAPOROSCOPIC  8/10    CLOSE SECONDARY WOUND LOWER EXTREMITY Left 2/3/2021    Procedure:  Split Thickness Skin Graft to Leg of 18 square centimeters  Intermediate Closure of Leg of 8cm   ;  Surgeon: Lazaro Plascencia MD;  Location: [facility] OR    COLONOSCOPY N/A 1/15/2015    Procedure: COLONOSCOPY;  Surgeon: Johnson Kothari MD;  Location:  GI    DECOMPRESSION LUMBAR ONE LEVEL  4/3/2013    Procedure: DECOMPRESSION LUMBAR ONE LEVEL;  Open Decompression L3-4 bilateral;  Surgeon: Travis Coffey MD;  Location: [facility] OR    DECOMPRESSION, FUSION CERVICAL ANTERIOR ONE LEVEL, COMBINED  3/23/2012    Procedure:COMBINED DECOMPRESSION, FUSION CERVICAL ANTERIOR ONE LEVEL; Anterior Cervical Decompression and Fusion C4-6; Surgeon:TRAVIS COFFEY; Location: OR    ELBOW SURGERY      EXPLORE SPINE, REMOVE HARDWARE, COMBINED  5/23/2013    Procedure: COMBINED EXPLORE SPINE, REMOVE HARDWARE;  Exploration Lumbar Wound for fluid collection;  Surgeon: Travis Coffey MD;  Location: [facility] OR    FUSION CERVICAL ANTERIOR TWO LEVELS  3/26/2012    Procedure:FUSION CERVICAL ANTERIOR TWO LEVELS; Anterior Cervical Fusion C4-6, Anterior Cervical  Hematoma Evacuation; Surgeon:TRAVIS COFFEY; Location: OR    IR  LUMBAR EPIDURAL STEROID INJECTION  3/28/2003    IRRIGATION AND DEBRIDEMENT LOWER EXTREMITY, COMBINED Left 1/10/2021    Procedure: 1.  Irrigation and excisional debridement to muscle left distal medial calf chronic wounds total area measuring 9 cm x 4 cm 2.  Irrigation and excisional debridement to fascia left medial calf chronic hematoma measuring 29 x 6.7 x 4.2 cm 3.  Primary complex wound closure left medial distal calf 9 cm in length;  Surgeon: Rod Kemp MD;  Location: [facility] OR    IRRIGATION AND DEBRIDEMENT LOWER EXTREMITY, COMBINED Left 2021    Procedure: Evacuation of hematoma debridement of skin, subcutaneous tissue and muscle 2,400 squared centimeters, placement of negative pressure wound therapy 2,400 squared centimeters;  Surgeon: Lazaro Plascencia MD;  Location: [facility] OR    IRRIGATION AND DEBRIDEMENT SPINE, CLOSE WOUND, COMBINED  3/26/2012    Procedure:COMBINED IRRIGATION AND DEBRIDEMENT SPINE, CLOSE WOUND; Surgeon:TRAVIS COFFEY; Location:[facility] OR    OTHER SURGICAL HISTORY      TN UNLISTED ORBIT    OTHER SURGICAL HISTORY      TN UNLISTED SPINE    OTHER SURGICAL HISTORY      TN UNLISTED NECK/THORAX    OTHER SURGICAL HISTORY      (IA) TN TOTAL HIP ARTHROPLASTY    OTHER SURGICAL HISTORY      (IA) TN NEE SCOPE MED W LAT MENISCECT WWO DEBRIBE/SHAVE ANY CO    removal of cyst of back   2.5week    TONSILLECTOMY      wisdom teeth[      [facility] NONSPECIFIC PROCEDURE      repair of TAA with graft    [facility] TOTAL HIP ARTHROPLASTY  2010    Left AMANDA    [facility] TOTAL HIP ARTHROPLASTY  2002    R hip replacement comp's by nerve injury with pain down into R leg, nadya below knee       FAMILY HISTORY:  Family History   Problem Relation Age of Onset    Cerebrovascular Disease Father          age 86, had M.I. ,diabetic also    Prostate Cancer Father     Diabetes Father     Cancer Mother          age 83 of dementia, also h/o lymphoma    Diabetes Mother     Hypertension Brother         2 brothers with hypertension & sleep  apnea    Hypertension Sister         Born ~1936    Sleep Apnea Brother          age 78, Alzheimers    No Known Problems Son     No Known Problems Daughter     No Known Problems Son     Family History Negative Brother         Had 5 brothers, three still alive as of 2019    Colon Cancer No family hx of        SOCIAL HISTORY:  Social History     Socioeconomic History    Marital status:      Spouse name: None    Number of children: 3    Years of education: None    Highest education level: High school graduate   Occupational History     Employer: DISABLED     Comment: Previous , disabled since ~   Tobacco Use    Smoking status: Never    Smokeless tobacco: Never   Vaping Use    Vaping Use: Never used   Substance and Sexual Activity    Alcohol use: No     Comment: Stopped drinking alcohol ~    Drug use: No    Sexual activity: Not Currently   Social History Narrative    , 3 children, retired (has worked multiple jobs, 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  (last updated 2021)      Social Determinants of Health     Financial Resource Strain: Low Risk  (2021)    Overall Financial Resource Strain (CARDIA)     Difficulty of Paying Living Expenses: Not hard at all   Food Insecurity: No Food Insecurity (2021)    Hunger Vital Sign     Worried About Running Out of Food in the Last Year: Never true     Ran Out of Food in the Last Year: Never true   Transportation Needs: No Transportation Needs (2021)    PRAPARE - Transportation     Lack of Transportation (Medical): No     Lack of Transportation (Non-Medical): No   Physical Activity: Inactive (2021)    Exercise Vital Sign     Days of Exercise per Week: 0 days     Minutes of Exercise per Session: 30 min   Stress: No Stress Concern Present (2021)    [place] [employer] [place] [employer] - Occupational Stress Questionnaire     Feeling of Stress : Not at all   Social Connections: Unknown (2021)    Social Connection and  "Isolation Panel [NHANES]     Frequency of Communication with Friends and Family: More than three times a week     Frequency of Social Gatherings with Friends and Family: Patient refused     Attends Yazidi Services: Patient refused     Active Member of Clubs or Organizations: No     Marital Status:    Intimate Partner Violence: Not At Risk (10/29/2019)    Humiliation, Afraid, Rape, and Kick questionnaire     Fear of Current or Ex-Partner: No     Emotionally Abused: No     Physically Abused: No     Sexually Abused: No   Housing Stability: Low Risk  (12/7/2021)    Housing Stability Vital Sign     Unable to Pay for Housing in the Last Year: No     Number of Places Lived in the Last Year: 1     Unstable Housing in the Last Year: No       Review of Systems:  Skin:        Eyes:       ENT:       Respiratory:  Negative    Cardiovascular:  Negative;palpitations;chest pain;syncope or near-syncope;dizziness;cyanosis;edema lightheadedness;Positive for;fatigue  Gastroenterology:      Genitourinary:       Musculoskeletal:  Positive for back pain  Neurologic:  Positive for stroke  Psychiatric:       Heme/Lymph/Imm:       Endocrine:  Positive for thyroid disorder    Physical Exam:  Vitals: /81   Pulse 88   Ht 1.88 m (6' 2\")   Wt 144.7 kg (319 lb 1.6 oz)   BMI 40.97 kg/m      Constitutional:  cooperative;in no acute distress obese      Skin:  warm and dry to the touch          Head:  no masses or lesions        Eyes:  pupils equal and round        Lymph:      ENT:  no pallor or cyanosis        Neck:  no carotid bruit        Respiratory:  clear to auscultation         Cardiac: regular rhythm occasional premature beats   crisp prosthetic valve sounds systolic ejection murmur          pulses below the femoral arteries are diminished                                      GI:  abdomen soft        Extremities and Muscular Skeletal:  no edema;no deformities, clubbing, cyanosis, erythema observed              Neurological: "  no gross motor deficits;affect appropriate        Psych:  Alert and Oriented x 3        Recent Lab Results:  LIPID RESULTS:  Lab Results   Component Value Date    CHOL 254 (H) 04/18/2021    HDL 43 04/18/2021     (H) 04/18/2021    TRIG 233 (H) 04/18/2021    CHOLHDLRATIO 4.0 12/24/2014       LIVER ENZYME RESULTS:  Lab Results   Component Value Date    AST 24 09/02/2022    AST 21 04/21/2021    ALT 28 09/02/2022    ALT 31 04/21/2021       CBC RESULTS:  Lab Results   Component Value Date    WBC 6.3 09/02/2022    WBC 6.5 06/18/2021    RBC 5.25 09/02/2022    RBC 4.69 06/18/2021    HGB 17.2 09/02/2022    HGB 13.9 06/18/2021    HCT 49.6 09/02/2022    HCT 42.2 06/18/2021    MCV 95 09/02/2022    MCV 90 06/18/2021    MCH 32.8 09/02/2022    MCH 29.6 06/18/2021    MCHC 34.7 09/02/2022    MCHC 32.9 06/18/2021    RDW 12.3 09/02/2022    RDW 14.9 06/18/2021     09/02/2022     06/18/2021       BMP RESULTS:  Lab Results   Component Value Date     03/14/2023     06/18/2021    POTASSIUM 4.4 03/14/2023    POTASSIUM 4.3 09/02/2022    POTASSIUM 4.1 06/18/2021    CHLORIDE 103 03/14/2023    CHLORIDE 101 09/02/2022    CHLORIDE 108 06/18/2021    CO2 22 03/14/2023    CO2 25 09/02/2022    CO2 29 06/18/2021    ANIONGAP 14 03/14/2023    ANIONGAP 11 09/02/2022    ANIONGAP 3 06/18/2021     (H) 03/14/2023    GLC 91 09/02/2022    GLC 90 06/18/2021    BUN 17.4 03/14/2023    BUN 19 09/02/2022    BUN 18 06/18/2021    CR 1.02 03/14/2023    CR 1.02 06/18/2021    GFRESTIMATED 81 03/14/2023    GFRESTIMATED >60 01/18/2022    GFRESTIMATED 77 06/18/2021    GFRESTBLACK 89 06/18/2021    CATERINA 9.4 03/14/2023    CATERINA 9.0 06/18/2021        A1C RESULTS:  Lab Results   Component Value Date    A1C 4.9 01/23/2021       INR RESULTS:  Lab Results   Component Value Date    INR 3.1 (H) 08/29/2023    INR 3.2 (H) 07/19/2023    INR 2.78 (H) 03/21/2022    INR 3.83 (H) 01/26/2022    INR 3.1 (A) 09/14/2021    INR 3.9 (A) 09/07/2021    INR 3.0  07/16/2021    INR 2.8 (A) 07/09/2021           CC  Herve Morgan MD  [address] [place] [address]

## 2023-09-01 NOTE — TELEPHONE ENCOUNTER
[facility] Call Center    Phone Message    May a detailed message be left on voicemail: yes     Reason for Call: Other: The patient would like to know If his grandchildren should have genetic testing for the Marfan's Syndrome? Please call patient to discuss.     Action Taken: Other: Cardiology    Travel Screening: Not Applicable    Thank you!  [facility]

## 2023-09-05 NOTE — TELEPHONE ENCOUNTER
Raquel Miles, Stephani Pérez, RN  Caller: Unspecified (4 days ago,  1:13 PM)  I don't have any idea.  Was he ever diagnosed with Marfans?? I can't even access his records to see why he had aorta repair and AVR, it just says dissection, but don't know if that is accurate. Is there any way to access Dr. Trevizo's notes from 1992 to see what his diagnosis is??    Arkami message returned to patient.  Stephani Mcdonald, SHYANN on 9/5/2023 at 1:25 PM

## 2023-09-07 NOTE — TELEPHONE ENCOUNTER
"Contacted pt, he states he has difficulty walking long distances due to back and leg pain. States there is a \"knick\" in his spinal cord and also has a disc problem. He uses a cane when walking longer distances and has to stop to rest when walking > 200 feet due to pain.   " Colorectal Surgery

## 2023-09-08 ENCOUNTER — HOSPITAL ENCOUNTER (OUTPATIENT)
Dept: CARDIOLOGY | Facility: CLINIC | Age: 68
Discharge: HOME | End: 2023-09-08
Attending: INTERNAL MEDICINE | Admitting: INTERNAL MEDICINE
Payer: COMMERCIAL

## 2023-09-08 DIAGNOSIS — I48.0 PAF (PAROXYSMAL ATRIAL FIBRILLATION) (H): ICD-10-CM

## 2023-09-08 DIAGNOSIS — Z95.2 S/P AVR (AORTIC VALVE REPLACEMENT): ICD-10-CM

## 2023-09-08 DIAGNOSIS — I48.20 CHRONIC ATRIAL FIBRILLATION (H): ICD-10-CM

## 2023-09-08 LAB — LVEF ECHO: NORMAL

## 2023-09-08 PROCEDURE — 93306 TTE W/DOPPLER COMPLETE: CPT | Mod: 26 | Performed by: INTERNAL MEDICINE

## 2023-09-08 PROCEDURE — 255N000002 HC RX 255 OP 636: Performed by: INTERNAL MEDICINE

## 2023-09-08 PROCEDURE — 999N000208 ECHOCARDIOGRAM COMPLETE

## 2023-09-08 RX ADMIN — HUMAN ALBUMIN MICROSPHERES AND PERFLUTREN 3 ML: 10; .22 INJECTION, SOLUTION INTRAVENOUS at 11:09

## 2023-09-14 DIAGNOSIS — G89.29 CHRONIC BILATERAL LOW BACK PAIN WITHOUT SCIATICA: ICD-10-CM

## 2023-09-14 DIAGNOSIS — J30.2 SEASONAL ALLERGIC RHINITIS, UNSPECIFIED TRIGGER: ICD-10-CM

## 2023-09-14 DIAGNOSIS — M54.50 CHRONIC BILATERAL LOW BACK PAIN WITHOUT SCIATICA: ICD-10-CM

## 2023-09-14 RX ORDER — AZELASTINE HYDROCHLORIDE 137 UG/1
1 SPRAY, METERED NASAL 2 TIMES DAILY
Qty: 30 ML | Refills: 5 | Status: SHIPPED | OUTPATIENT
Start: 2023-09-14 | End: 2023-09-15

## 2023-09-14 RX ORDER — PREGABALIN 100 MG/1
100 CAPSULE ORAL 3 TIMES DAILY
Qty: 270 CAPSULE | Refills: 3 | Status: SHIPPED | OUTPATIENT
Start: 2023-09-14 | End: 2023-09-15

## 2023-09-14 NOTE — TELEPHONE ENCOUNTER
Routing refill request to provider for review/approval because:  Drug not on the [facility] refill protocol   Failing due to age    Ivett Whelan RN

## 2023-09-15 ENCOUNTER — OFFICE VISIT (OUTPATIENT)
Dept: PEDIATRICS | Facility: CLINIC | Age: 68
End: 2023-09-15
Payer: COMMERCIAL

## 2023-09-15 VITALS
HEIGHT: 71 IN | SYSTOLIC BLOOD PRESSURE: 117 MMHG | BODY MASS INDEX: 44.1 KG/M2 | HEART RATE: 82 BPM | DIASTOLIC BLOOD PRESSURE: 79 MMHG | OXYGEN SATURATION: 100 % | WEIGHT: 315 LBS | TEMPERATURE: 97.5 F

## 2023-09-15 DIAGNOSIS — E78.5 HYPERLIPIDEMIA LDL GOAL <130: ICD-10-CM

## 2023-09-15 DIAGNOSIS — M54.50 CHRONIC BILATERAL LOW BACK PAIN WITHOUT SCIATICA: ICD-10-CM

## 2023-09-15 DIAGNOSIS — F33.1 MAJOR DEPRESSIVE DISORDER, RECURRENT EPISODE, MODERATE (H): ICD-10-CM

## 2023-09-15 DIAGNOSIS — J30.2 SEASONAL ALLERGIC RHINITIS, UNSPECIFIED TRIGGER: ICD-10-CM

## 2023-09-15 DIAGNOSIS — I10 ESSENTIAL HYPERTENSION: ICD-10-CM

## 2023-09-15 DIAGNOSIS — M62.830 BACK MUSCLE SPASM: ICD-10-CM

## 2023-09-15 DIAGNOSIS — E66.01 MORBID OBESITY, UNSPECIFIED OBESITY TYPE (H): ICD-10-CM

## 2023-09-15 DIAGNOSIS — Z12.11 SPECIAL SCREENING FOR MALIGNANT NEOPLASMS, COLON: ICD-10-CM

## 2023-09-15 DIAGNOSIS — G89.29 CHRONIC BILATERAL LOW BACK PAIN WITHOUT SCIATICA: ICD-10-CM

## 2023-09-15 DIAGNOSIS — Z00.00 ENCOUNTER FOR ANNUAL WELLNESS EXAM IN MEDICARE PATIENT: Primary | ICD-10-CM

## 2023-09-15 LAB
ALBUMIN SERPL BCG-MCNC: 4 G/DL (ref 3.5–5.2)
ALP SERPL-CCNC: 55 U/L (ref 40–129)
ALT SERPL W P-5'-P-CCNC: 27 U/L (ref 0–70)
ANION GAP SERPL CALCULATED.3IONS-SCNC: 8 MMOL/L (ref 7–15)
AST SERPL W P-5'-P-CCNC: 34 U/L (ref 0–45)
BILIRUB SERPL-MCNC: 1.2 MG/DL
BUN SERPL-MCNC: 15.6 MG/DL (ref 8–23)
CALCIUM SERPL-MCNC: 9.5 MG/DL (ref 8.8–10.2)
CHLORIDE SERPL-SCNC: 103 MMOL/L (ref 98–107)
CHOLEST SERPL-MCNC: 161 MG/DL
CREAT SERPL-MCNC: 1.14 MG/DL (ref 0.67–1.17)
DEPRECATED HCO3 PLAS-SCNC: 29 MMOL/L (ref 22–29)
EGFRCR SERPLBLD CKD-EPI 2021: 71 ML/MIN/1.73M2
GLUCOSE SERPL-MCNC: 74 MG/DL (ref 70–99)
HBA1C MFR BLD: 5.6 % (ref 0–5.6)
HDLC SERPL-MCNC: 43 MG/DL
LDLC SERPL CALC-MCNC: 81 MG/DL
NONHDLC SERPL-MCNC: 118 MG/DL
POTASSIUM SERPL-SCNC: 4.6 MMOL/L (ref 3.4–5.3)
PROT SERPL-MCNC: 6.7 G/DL (ref 6.4–8.3)
SODIUM SERPL-SCNC: 140 MMOL/L (ref 136–145)
TRIGL SERPL-MCNC: 187 MG/DL

## 2023-09-15 PROCEDURE — 90662 IIV NO PRSV INCREASED AG IM: CPT | Performed by: INTERNAL MEDICINE

## 2023-09-15 PROCEDURE — 99213 OFFICE O/P EST LOW 20 MIN: CPT | Mod: 25 | Performed by: INTERNAL MEDICINE

## 2023-09-15 PROCEDURE — 80061 LIPID PANEL: CPT | Performed by: INTERNAL MEDICINE

## 2023-09-15 PROCEDURE — 83036 HEMOGLOBIN GLYCOSYLATED A1C: CPT | Performed by: INTERNAL MEDICINE

## 2023-09-15 PROCEDURE — G0008 ADMIN INFLUENZA VIRUS VAC: HCPCS | Performed by: INTERNAL MEDICINE

## 2023-09-15 PROCEDURE — G0402 INITIAL PREVENTIVE EXAM: HCPCS | Performed by: INTERNAL MEDICINE

## 2023-09-15 PROCEDURE — 36415 COLL VENOUS BLD VENIPUNCTURE: CPT | Performed by: INTERNAL MEDICINE

## 2023-09-15 PROCEDURE — 80053 COMPREHEN METABOLIC PANEL: CPT | Performed by: INTERNAL MEDICINE

## 2023-09-15 RX ORDER — GUANFACINE 1 MG/1
1 TABLET ORAL AT BEDTIME
Qty: 90 TABLET | Refills: 1 | Status: SHIPPED | OUTPATIENT
Start: 2023-09-15 | End: 2024-03-13

## 2023-09-15 RX ORDER — LISINOPRIL 10 MG/1
10 TABLET ORAL DAILY
Qty: 90 TABLET | Refills: 3 | Status: SHIPPED | OUTPATIENT
Start: 2023-09-15 | End: 2024-09-16

## 2023-09-15 RX ORDER — PREGABALIN 100 MG/1
100 CAPSULE ORAL 3 TIMES DAILY
Qty: 270 CAPSULE | Refills: 3 | Status: SHIPPED | OUTPATIENT
Start: 2023-09-15 | End: 2024-09-16

## 2023-09-15 RX ORDER — DIAZEPAM 5 MG
5 TABLET ORAL
Qty: 30 TABLET | Refills: 0 | Status: SHIPPED | OUTPATIENT
Start: 2023-09-24 | End: 2023-10-19

## 2023-09-15 RX ORDER — LIOTHYRONINE SODIUM 25 UG/1
1 TABLET ORAL
COMMUNITY
Start: 2022-11-01 | End: 2023-09-15

## 2023-09-15 RX ORDER — AZELASTINE HYDROCHLORIDE 137 UG/1
1 SPRAY, METERED NASAL 2 TIMES DAILY
Qty: 30 ML | Refills: 11 | Status: SHIPPED | OUTPATIENT
Start: 2023-09-15 | End: 2023-10-31

## 2023-09-15 RX ORDER — SIMVASTATIN 40 MG
40 TABLET ORAL AT BEDTIME
Qty: 90 TABLET | Refills: 3 | Status: ON HOLD | OUTPATIENT
Start: 2023-09-15 | End: 2023-11-02

## 2023-09-15 SDOH — ECONOMIC STABILITY: INCOME INSECURITY: HOW HARD IS IT FOR YOU TO PAY FOR THE VERY BASICS LIKE FOOD, HOUSING, MEDICAL CARE, AND HEATING?: SOMEWHAT HARD

## 2023-09-15 SDOH — ECONOMIC STABILITY: FOOD INSECURITY: WITHIN THE PAST 12 MONTHS, THE FOOD YOU BOUGHT JUST DIDN'T LAST AND YOU DIDN'T HAVE MONEY TO GET MORE.: NEVER TRUE

## 2023-09-15 SDOH — HEALTH STABILITY: PHYSICAL HEALTH: ON AVERAGE, HOW MANY MINUTES DO YOU ENGAGE IN EXERCISE AT THIS LEVEL?: 30 MIN

## 2023-09-15 SDOH — HEALTH STABILITY: PHYSICAL HEALTH: ON AVERAGE, HOW MANY DAYS PER WEEK DO YOU ENGAGE IN MODERATE TO STRENUOUS EXERCISE (LIKE A BRISK WALK)?: 5 DAYS

## 2023-09-15 SDOH — ECONOMIC STABILITY: FOOD INSECURITY: WITHIN THE PAST 12 MONTHS, YOU WORRIED THAT YOUR FOOD WOULD RUN OUT BEFORE YOU GOT MONEY TO BUY MORE.: NEVER TRUE

## 2023-09-15 SDOH — ECONOMIC STABILITY: INCOME INSECURITY: IN THE LAST 12 MONTHS, WAS THERE A TIME WHEN YOU WERE NOT ABLE TO PAY THE MORTGAGE OR RENT ON TIME?: NO

## 2023-09-15 ASSESSMENT — ENCOUNTER SYMPTOMS
CHILLS: 0
ARTHRALGIAS: 1
ABDOMINAL PAIN: 0
DIARRHEA: 0
JOINT SWELLING: 0
PALPITATIONS: 0
HEMATOCHEZIA: 0
SORE THROAT: 0
NAUSEA: 0
CONSTIPATION: 0
HEARTBURN: 0
COUGH: 0
MYALGIAS: 0
DYSURIA: 0
HEMATURIA: 0
PARESTHESIAS: 0
HEADACHES: 0
WEAKNESS: 0
DIZZINESS: 0
SHORTNESS OF BREATH: 0
NERVOUS/ANXIOUS: 1
EYE PAIN: 0
FEVER: 0
FREQUENCY: 0

## 2023-09-15 ASSESSMENT — SOCIAL DETERMINANTS OF HEALTH (SDOH)
HOW OFTEN DO YOU GET TOGETHER WITH FRIENDS OR RELATIVES?: NEVER
DO YOU BELONG TO ANY CLUBS OR ORGANIZATIONS SUCH AS CHURCH GROUPS UNIONS, FRATERNAL OR ATHLETIC GROUPS, OR SCHOOL GROUPS?: NO
IN A TYPICAL WEEK, HOW MANY TIMES DO YOU TALK ON THE PHONE WITH FAMILY, FRIENDS, OR NEIGHBORS?: MORE THAN THREE TIMES A WEEK
HOW OFTEN DO YOU ATTEND CHURCH OR RELIGIOUS SERVICES?: NEVER

## 2023-09-15 ASSESSMENT — PATIENT HEALTH QUESTIONNAIRE - PHQ9
SUM OF ALL RESPONSES TO PHQ QUESTIONS 1-9: 3
10. IF YOU CHECKED OFF ANY PROBLEMS, HOW DIFFICULT HAVE THESE PROBLEMS MADE IT FOR YOU TO DO YOUR WORK, TAKE CARE OF THINGS AT HOME, OR GET ALONG WITH OTHER PEOPLE: VERY DIFFICULT
SUM OF ALL RESPONSES TO PHQ QUESTIONS 1-9: 3

## 2023-09-15 ASSESSMENT — PAIN SCALES - GENERAL: PAINLEVEL: MODERATE PAIN (5)

## 2023-09-15 ASSESSMENT — LIFESTYLE VARIABLES
HOW MANY STANDARD DRINKS CONTAINING ALCOHOL DO YOU HAVE ON A TYPICAL DAY: PATIENT DOES NOT DRINK
HOW OFTEN DO YOU HAVE SIX OR MORE DRINKS ON ONE OCCASION: NEVER
AUDIT-C TOTAL SCORE: 0
SKIP TO QUESTIONS 9-10: 1
HOW OFTEN DO YOU HAVE A DRINK CONTAINING ALCOHOL: NEVER

## 2023-09-15 ASSESSMENT — ACTIVITIES OF DAILY LIVING (ADL): CURRENT_FUNCTION: NO ASSISTANCE NEEDED

## 2023-09-15 NOTE — PATIENT INSTRUCTIONS
Colonoscopy is due this year.    Refills given to last until next year.    Flu shot today    Get COVID shots in 1 month/.    Try to limit complex carbs (rice, bread, pasta, potatoes) and simple carbs (pop, juice, alcohol.)  Eating more lean proteins (chicken, fish, eggs, beans, nuts) and unlimited vegetables and fruits can definitely help as well.     In general, try to spread meals out during the day, eating smaller breakfasts, lunches and dinners--and having healthy snacks in between.  It's a good idea to limit your carbs at mealtimes to 60-75 grams of carbs, and to limit your carbs at snacktimes to 15-30 grams of carbs.  (Check the food labels to add up these carbs.)     Goal weight loss:  1 pound per week, 4 pounds per month.    Follow up in 3-6 months for a weight check.    Herve Morgan MD  Internal Medicine and Pediatrics     Patient Education   Personalized Prevention Plan  You are due for the preventive services outlined below.  Your care team is available to assist you in scheduling these services.  If you have already completed any of these items, please share that information with your care team to update in your medical record.  Health Maintenance Due   Topic Date Due     ANNUAL REVIEW OF HM ORDERS  Never done     Migraine Action Plan  Never done     URINE DRUG SCREEN  07/28/2022     Annual Wellness Visit  12/07/2022     COVID-19 Vaccine (5 - Moderna series) 04/12/2023     Flu Vaccine (1) 09/01/2023     Hearing Loss: Care Instructions  Overview     Hearing loss is a sudden or slow decrease in how well you hear. It can range from slight to profound. Permanent hearing loss can occur with aging. It also can happen when you are exposed long-term to loud noise. Examples include listening to loud music, riding motorcycles, or being around other loud machines.  Hearing loss can affect your work and home life. It can make you feel lonely or depressed. You may feel that you have lost your independence. But hearing  aids and other devices can help you hear better and feel connected to others.  Follow-up care is a key part of your treatment and safety. Be sure to make and go to all appointments, and call your doctor if you are having problems. It's also a good idea to know your test results and keep a list of the medicines you take.  How can you care for yourself at home?  Avoid loud noises whenever possible. This helps keep your hearing from getting worse.  Always wear hearing protection around loud noises.  Wear a hearing aid as directed.  A professional can help you pick a hearing aid that will work best for you.  You can also get hearing aids over the counter for mild to moderate hearing loss.  Have hearing tests as your doctor suggests. They can show whether your hearing has changed. Your hearing aid may need to be adjusted.  Use other devices as needed. These may include:  Telephone amplifiers and hearing aids that can connect to a television, stereo, radio, or microphone.  Devices that use lights or vibrations. These alert you to the doorbell, a ringing telephone, or a baby monitor.  Television closed-captioning. This shows the words at the bottom of the screen. Most new TVs can do this.  TTY (text telephone). This lets you type messages back and forth on the telephone instead of talking or listening. These devices are also called TDD. When messages are typed on the keyboard, they are sent over the phone line to a receiving TTY. The message is shown on a monitor.  Use text messaging, social media, and email if it is hard for you to communicate by telephone.  Try to learn a listening technique called speechreading. It is not lipreading. You pay attention to people's gestures, expressions, posture, and tone of voice. These clues can help you understand what a person is saying. Face the person you are talking to, and have them face you. Make sure the lighting is good. You need to see the other person's face clearly.  Think  "about counseling if you need help to adjust to your hearing loss.  When should you call for help?  Watch closely for changes in your health, and be sure to contact your doctor if:    You think your hearing is getting worse.     You have new symptoms, such as dizziness or nausea.   Where can you learn more?  Go to https://www.[employer].net/patiented  Enter R798 in the search box to learn more about \"Hearing Loss: Care Instructions.\"  Current as of: March 1, 2023               Content Version: 13.7    4957-0565 [employer].   Care instructions adapted under license by your healthcare professional. If you have questions about a medical condition or this instruction, always ask your healthcare professional. [employer] disclaims any warranty or liability for your use of this information.      Preventing Falls: Care Instructions    Talk to your doctor about the medicines you take. Ask if any of them increase the risk of falls and whether they can be changed or stopped.   Try to exercise regularly. It can help improve your strength and balance. This can help lower your risk of falling.     Practice fall safety and prevention.    Wear low-heeled shoes that fit well and give your feet good support. Talk to your doctor if you have foot problems that make this hard.  Carry a cellphone or wear a medical alert device that you can use to call for help.  Use stepladders instead of chairs to reach high objects. Don't climb if you're at risk for falls. Ask for help, if needed.  Wear the correct eyeglasses, if you need them.    Make your home safer.    Remove rugs, cords, clutter, and furniture from walkways.  Keep your house well lit. Use night-lights in hallways and bathrooms.  Install and use sturdy handrails on stairways.  Wear nonskid footwear, even inside. Don't walk barefoot or in socks without shoes.    Be safe outside.    Use handrails, curb cuts, and ramps whenever possible.  Keep your hands free " "by using a shoulder bag or backpack.  Try to walk in well-lit areas. Watch out for uneven ground, changes in pavement, and debris.  Be careful in the winter. Walk on the grass or gravel when sidewalks are slippery. Use de-icer on steps and walkways. Add non-slip devices to shoes.    Put grab bars and nonskid mats in your shower or tub and near the toilet. Try to use a shower chair or bath bench when bathing.   Get into a tub or shower by putting in your weaker leg first. Get out with your strong side first. Have a phone or medical alert device in the bathroom with you.   Where can you learn more?  Go to https://www.[employer].net/patiented  Enter G117 in the search box to learn more about \"Preventing Falls: Care Instructions.\"  Current as of: November 9, 2022               Content Version: 13.7    9670-2600 [employer].   Care instructions adapted under license by your healthcare professional. If you have questions about a medical condition or this instruction, always ask your healthcare professional. [employer] disclaims any warranty or liability for your use of this information.      How to Get Up Safely After a Fall: Care Instructions  Overview     If you have injuries, health problems, or other reasons that may make it easy for you to fall at home, it is a good idea to learn how to get up safely after a fall. Learning how to get up correctly can help you avoid making an injury worse.  Also, knowing what to do if you cannot get up can help you stay safe until help arrives.  Follow-up care is a key part of your treatment and safety. Be sure to make and go to all appointments, and call your doctor if you are having problems. It's also a good idea to know your test results and keep a list of the medicines you take.  How can you care for yourself after a fall?  If you think you can get up  First lie still for a few minutes and think about how you feel. If your body feels okay and you think " you can get up safely, follow the rest of the steps below:  Look for a chair or other piece of furniture that is close to you.  Roll onto your side and rest. Roll by turning your head in the direction you want to roll, move your shoulder and arm, then hip and leg in the same direction.  Lie still for a moment to let your blood pressure adjust.  Slowly push your upper body up, lift your head, and take a moment to rest.  Slowly get up on your hands and knees, and crawl to the chair or other stable piece of furniture.  Put your hands on the chair.  Move one foot forward, and place it flat on the floor. Your other leg should be bent with the knee on the floor.  Rise slowly, turn your body, and sit in the chair. Stay seated for a bit and think about how you feel. Call for help. Even if you feel okay, let someone know what happened to you. You might not know that you have a serious injury.  If you cannot get up  If you think you are injured after a fall or you cannot get up, try not to panic.  Call out for help.  If you have a phone within reach or you have an emergency call device, use it to call for help.  If you do not have a phone within reach, try to slide yourself toward it. If you cannot get to the phone, try to slide toward a door or window or a place where you think you can be heard.  Ada or use an object to make noise so someone might hear you.  If you can reach something that you can use for a pillow, place it under your head. Try to stay warm by covering yourself with a blanket or clothing while you wait for help.  When should you call for help?   Call 911 anytime you think you may need emergency care. For example, call if:    You passed out (lost consciousness).     You cannot get up after a fall.     You have severe pain.   Call your doctor now or seek immediate medical care if:    You have new or worse pain.     You are dizzy or lightheaded.     You hit your head.   Watch closely for changes in your  "health, and be sure to contact your doctor if:    You do not get better as expected.   Where can you learn more?  Go to https://www.[employer].net/patiented  Enter G513 in the search box to learn more about \"How to Get Up Safely After a Fall: Care Instructions.\"  Current as of: November 14, 2022               Content Version: 13.7    8640-0945 [employer].   Care instructions adapted under license by your healthcare professional. If you have questions about a medical condition or this instruction, always ask your healthcare professional. [employer] disclaims any warranty or liability for your use of this information.         "

## 2023-09-15 NOTE — PROGRESS NOTES
"SUBJECTIVE:   Nicko is a 67 year old who presents for Preventive Visit.      9/15/2023     9:22 AM   Additional Questions   Roomed by Alexandra Van CMA   Accompanied by N/A         9/15/2023     9:22 AM   Patient Reported Additional Medications   Patient reports taking the following new medications N/A       Are you in the first 12 months of your Medicare coverage?  Yes,  Visual Acuity:  Right Eye: 20/25   Left Eye: 20/16  Both Eyes: 20/25 with Glasses    Healthy Habits:     In general, how would you rate your overall health?  Good    Frequency of exercise:  4-5 days/week    Duration of exercise:  30-45 minutes    Do you usually eat at least 4 servings of fruit and vegetables a day, include whole grains    & fiber and avoid regularly eating high fat or \"junk\" foods?  Yes    Taking medications regularly:  Yes    Medication side effects:  None    Ability to successfully perform activities of daily living:  No assistance needed    Home Safety:  No safety concerns identified    Hearing Impairment:  Need to ask people to speak up or repeat themselves and difficulty understanding soft or whispered speech    In the past 6 months, have you been bothered by leaking of urine?  No    In general, how would you rate your overall mental or emotional health?  Good    Additional concerns today:  Yes        Have you ever done Advance Care Planning? (For example, a Health Directive, POLST, or a discussion with a medical provider or your loved ones about your wishes): Yes, patient states has an Advance Care Planning document and will bring a copy to the clinic.       Fall risk  Fallen 2 or more times in the past year?: Yes  Any fall with injury in the past year?: Yes    Cognitive Screening   1) Repeat 3 items (Leader, Season, Table)    2) Clock draw: NORMAL  3) 3 item recall: Recalls 2 objects   Results: NORMAL clock, 1-2 items recalled: COGNITIVE IMPAIRMENT LESS LIKELY    Mini-CogTM Copyright S Yusef. Licensed by the author for use " in [facility]; reprinted with permission ([email or web address]). All rights reserved.      Do you have sleep apnea, excessive snoring or daytime drowsiness? : no    Reviewed and updated as needed this visit by clinical staff   Tobacco  Allergies  Meds              Reviewed and updated as needed this visit by Provider                 Social History     Tobacco Use    Smoking status: Never    Smokeless tobacco: Never   Substance Use Topics    Alcohol use: No     Comment: Stopped drinking alcohol ~2009             9/15/2023     9:10 AM   Alcohol Use   Prescreen: >3 drinks/day or >7 drinks/week? Not Applicable     Do you have a current opioid prescription? No  Do you use any other controlled substances or medications that are not prescribed by a provider? None              Current providers sharing in care for this patient include:   Patient Care Team:  Herve Morgan MD as PCP - General (Internal Medicine - Pediatrics)  Herve Morgan MD as Assigned PCP  Brandy Bowser MD as Assigned Musculoskeletal Provider  Raquel Miles DO as Physician (Cardiovascular Disease)    The following health maintenance items are reviewed in Epic and correct as of today:  Health Maintenance   Topic Date Due    ANNUAL REVIEW OF HM ORDERS  Never done    MIGRAINE ACTION PLAN  Never done    URINE DRUG SCREEN  07/28/2022    MEDICARE ANNUAL WELLNESS VISIT  12/07/2022    COVID-19 Vaccine (5 - Moderna series) 04/12/2023    INFLUENZA VACCINE (1) 09/01/2023    TSH W/FREE T4 REFLEX  03/14/2024    PHQ-9  03/15/2024    FALL RISK ASSESSMENT  09/15/2024    LIPID  04/18/2026    ADVANCE CARE PLANNING  09/15/2028    COLORECTAL CANCER SCREENING  01/31/2030    DTAP/TDAP/TD IMMUNIZATION (3 - Td or Tdap) 12/07/2031    HEPATITIS C SCREENING  Completed    DEPRESSION ACTION PLAN  Completed    Pneumococcal Vaccine: 65+ Years  Completed    ZOSTER IMMUNIZATION  Completed    AORTIC ANEURYSM SCREENING (SYSTEM ASSIGNED)  Completed    IPV  "IMMUNIZATION  Aged Out    HPV IMMUNIZATION  Aged Out    MENINGITIS IMMUNIZATION  Aged Out     Lab work is in process  Labs reviewed in EPIC          Review of Systems   Constitutional:  Negative for chills and fever.   HENT:  Positive for hearing loss. Negative for congestion, ear pain and sore throat.    Eyes:  Negative for pain and visual disturbance.   Respiratory:  Negative for cough and shortness of breath.    Cardiovascular:  Negative for chest pain, palpitations and peripheral edema.   Gastrointestinal:  Negative for abdominal pain, constipation, diarrhea, heartburn, hematochezia and nausea.   Genitourinary:  Positive for impotence and urgency. Negative for dysuria, frequency, genital sores, hematuria and penile discharge.   Musculoskeletal:  Positive for arthralgias. Negative for joint swelling and myalgias.   Skin:  Negative for rash.   Neurological:  Negative for dizziness, weakness, headaches and paresthesias.   Psychiatric/Behavioral:  Negative for mood changes. The patient is nervous/anxious.        OBJECTIVE:   /79 (BP Location: Right arm, Patient Position: Sitting, Cuff Size: Adult Large)   Pulse 82   Temp 97.5  F (36.4  C) (Temporal)   Ht 1.81 m (5' 11.26\")   Wt 142.9 kg (315 lb)   SpO2 100%   BMI 43.61 kg/m   Estimated body mass index is 43.61 kg/m  as calculated from the following:    Height as of this encounter: 1.81 m (5' 11.26\").    Weight as of this encounter: 142.9 kg (315 lb).  Physical Exam  GENERAL: healthy, alert and no distress  EYES: Eyes grossly normal to inspection, PERRL and conjunctivae and sclerae normal  HENT: ear canals and TM's normal, nose and mouth without ulcers or lesions  NECK: no adenopathy, no asymmetry, masses, or scars and thyroid normal to palpation  RESP: lungs clear to auscultation - no rales, rhonchi or wheezes  CV: regular rate and rhythm, normal S1 S2, no S3 or S4, no murmur, click or rub, no peripheral edema and peripheral pulses strong  ABDOMEN: soft, " nontender, no hepatosplenomegaly, no masses and bowel sounds normal   (male): normal male genitalia without lesions or urethral discharge, no hernia  RECTAL: normal sphincter tone, no rectal masses, prostate normal size, smooth, nontender without nodules or masses  MS: no gross musculoskeletal defects noted, no edema  SKIN: no suspicious lesions or rashes  NEURO: Normal strength and tone, mentation intact and speech normal  PSYCH: mentation appears normal, affect normal/bright    Diagnostic Test Results:  Labs reviewed in Epic    ASSESSMENT / PLAN:   (Z00.00) Encounter for annual wellness exam in Medicare patient  (primary encounter diagnosis)  Comment:   Plan: Discussed diet, exercise, testicular self exam, blood pressure, cholesterol, and need for cancer surveillance at appropriate ages.     (J30.2) Seasonal allergic rhinitis, unspecified trigger  Comment:   Plan: Azelastine HCl 137 MCG/SPRAY SOLN        Refilled.     (M54.50,  G89.29) Chronic bilateral low back pain without sciatica  Comment:   Plan: pregabalin (LYRICA) 100 MG capsule        Ongoing lyrica.    (M62.830) Back muscle spasm  Comment:   Plan: diazepam (VALIUM) 5 MG tablet        Refilled for as needed use.     (F33.1) Major depressive disorder, recurrent episode, moderate (H)  Comment:   Plan: guanFACINE (TENEX) 1 MG tablet        Using for TOurette's.     (I10) Essential hypertension  Comment:   Plan: lisinopril (ZESTRIL) 10 MG tablet, Hemoglobin         A1c, Comprehensive metabolic panel (BMP + Alb,         Alk Phos, ALT, AST, Total. Bili, TP)        At goal.     (E78.5) Hyperlipidemia LDL goal <130  Comment:   Plan: simvastatin (ZOCOR) 40 MG tablet, Lipid panel         reflex to direct LDL Fasting        Tolerating well.     (Z12.11) Special screening for malignant neoplasms, colon  Comment:   Plan: Colonoscopy Screening  Referral        Screen, given his history of polyp 3 y ago.     Patient has been advised of split billing  "requirements and indicates understanding: Yes      COUNSELING:  Reviewed preventive health counseling, as reflected in patient instructions       Regular exercise       Healthy diet/nutrition       Vision screening       Hearing screening       Dental care       Bladder control       Folic Acid       Safe sex practices/STD prevention       Colon cancer screening       Prostate cancer screening      BMI:   Estimated body mass index is 43.61 kg/m  as calculated from the following:    Height as of this encounter: 1.81 m (5' 11.26\").    Weight as of this encounter: 142.9 kg (315 lb).   Weight management plan: Patient was referred to their PCP to discuss a diet and exercise plan.    Goal weight:  15 pounds in 3-6 months.     He reports that he has never smoked. He has never used smokeless tobacco.      Appropriate preventive services were discussed with this patient, including applicable screening as appropriate for cardiovascular disease, diabetes, osteopenia/osteoporosis, and glaucoma.  As appropriate for age/gender, discussed screening for colorectal cancer, prostate cancer, breast cancer, and cervical cancer. Checklist reviewing preventive services available has been given to the patient.    Reviewed patients plan of care and provided an AVS. The Basic Care Plan (routine screening as documented in Health Maintenance) for Nicko meets the Care Plan requirement. This Care Plan has been established and reviewed with the Patient.          Herve Morgan MD  [facility]    Identified Health Risks:  I have reviewed Opioid Use Disorder and Substance Use Disorder risk factors and made any needed referrals.   "

## 2023-09-15 NOTE — PROGRESS NOTES
"The patient was provided with written information regarding signs of hearing loss.  He is at risk for falling and has been provided with information to reduce the risk of falling at home.Answers submitted by the patient for this visit:  Patient Health Questionnaire (Submitted on 9/15/2023)  If you checked off any problems, how difficult have these problems made it for you to do your work, take care of things at home, or get along with other people?: Very difficult  PHQ9 TOTAL SCORE: 3  Annual Preventive Visit (Submitted on 9/15/2023)  Chief Complaint: Annual Exam:  In general, how would you rate your overall physical health?: good  Frequency of exercise:: 4-5 days/week  Do you usually eat at least 4 servings of fruit and vegetables a day, include whole grains & fiber, and avoid regularly eating high fat or \"junk\" foods? : Yes  Taking medications regularly:: Yes  Medication side effects:: None  Activities of Daily Living: no assistance needed  Home safety: no safety concerns identified  Hearing Impairment:: need to ask people to speak up or repeat themselves, difficulty understanding soft or whispered speech  In the past 6 months, have you been bothered by leaking of urine?: No  abdominal pain: No  Blood in stool: No  Blood in urine: No  chest pain: No  chills: No  congestion: No  constipation: No  cough: No  diarrhea: No  dizziness: No  ear pain: No  eye pain: No  nervous/anxious: Yes  fever: No  frequency: No  genital sores: No  headaches: No  hearing loss: Yes  heartburn: No  arthralgias: Yes  joint swelling: No  peripheral edema: No  mood changes: No  myalgias: No  nausea: No  dysuria: No  palpitations: No  Skin sensation changes: No  sore throat: No  urgency: Yes  rash: No  shortness of breath: No  visual disturbance: No  weakness: No  impotence: Yes  penile discharge: No  In general, how would you rate your overall mental or emotional health?: good  Additional concerns today:: Yes  Exercise outside of work " (Submitted on 9/15/2023)  Chief Complaint: Annual Exam:  Duration of exercise:: 30-45 minutes

## 2023-09-29 ENCOUNTER — LAB (OUTPATIENT)
Dept: LAB | Facility: CLINIC | Age: 68
End: 2023-09-29
Payer: COMMERCIAL

## 2023-09-29 ENCOUNTER — ANTICOAGULATION THERAPY VISIT (OUTPATIENT)
Dept: ANTICOAGULATION | Facility: CLINIC | Age: 68
End: 2023-09-29

## 2023-09-29 DIAGNOSIS — Z95.2 AORTIC VALVE PROSTHESIS PRESENT: ICD-10-CM

## 2023-09-29 DIAGNOSIS — I48.0 PAF (PAROXYSMAL ATRIAL FIBRILLATION) (H): ICD-10-CM

## 2023-09-29 DIAGNOSIS — I48.0 PAF (PAROXYSMAL ATRIAL FIBRILLATION) (H): Primary | ICD-10-CM

## 2023-09-29 LAB — INR BLD: 2.7 (ref 0.9–1.1)

## 2023-09-29 PROCEDURE — 36416 COLLJ CAPILLARY BLOOD SPEC: CPT

## 2023-09-29 PROCEDURE — 85610 PROTHROMBIN TIME: CPT

## 2023-09-29 NOTE — PROGRESS NOTES
ANTICOAGULATION MANAGEMENT     Todd S Aschoff 67 year old male is on warfarin with therapeutic INR result. (Goal INR 2.5-3.5)    Recent labs: (last 7 days)     09/29/23  1045   INR 2.7*       ASSESSMENT     Source(s): Chart Review and Patient/Caregiver Call     Warfarin doses taken: Warfarin taken as instructed  Diet: Increased greens/vitamin K in diet; plans to resume previous intake  Medication/supplement changes: None noted  New illness, injury, or hospitalization: No  Signs or symptoms of bleeding or clotting: No  Previous result: Therapeutic last 2(+) visits  Additional findings:  Wellness visit 9/15/23, no changes to plan of care       PLAN     Recommended plan for temporary change(s) affecting INR     Dosing Instructions: Continue your current warfarin dose with next INR in 4 weeks       Summary  As of 9/29/2023      Full warfarin instructions:  7.5 mg every Tue, Fri; 5 mg all other days   Next INR check:  10/27/2023               Telephone call with Nicko who agrees to plan and repeated back plan correctly    Lab visit scheduled    Education provided:   Please call back if any changes to your diet, medications or how you've been taking warfarin  Dietary considerations: importance of consistent vitamin K intake, vitamin K content of foods, and Vit K foods list mailed to patient  Contact [phone]  with any changes, questions or concerns.     Plan made per [facility] anticoagulation protocol    Lucina Andrew RN  Anticoagulation Clinic  9/29/2023    _______________________________________________________________________     Anticoagulation Episode Summary       Current INR goal:  2.5-3.5   TTR:  64.4 % (1 y)   Target end date:  Indefinite   Send INR reminders to:  AIDA GUERRERO    Indications    PAF (paroxysmal atrial fibrillation) (H) [I48.0]  Mechanical AV Replacement 1992 -- on Warfarin  [Z95.2]             Comments:               Anticoagulation Care Providers       Provider Role Specialty Phone number     Obdulio Ingram MD Referring Internal Medicine [phone]    Herve Morgan MD Referring Internal Medicine - Pediatrics [phone]

## 2023-10-19 DIAGNOSIS — M62.830 BACK MUSCLE SPASM: ICD-10-CM

## 2023-10-19 RX ORDER — DIAZEPAM 5 MG
5 TABLET ORAL
Qty: 30 TABLET | Refills: 0 | Status: SHIPPED | OUTPATIENT
Start: 2023-10-19 | End: 2023-11-21

## 2023-10-27 ENCOUNTER — ANTICOAGULATION THERAPY VISIT (OUTPATIENT)
Dept: ANTICOAGULATION | Facility: CLINIC | Age: 68
End: 2023-10-27

## 2023-10-27 ENCOUNTER — LAB (OUTPATIENT)
Dept: LAB | Facility: CLINIC | Age: 68
End: 2023-10-27
Payer: COMMERCIAL

## 2023-10-27 DIAGNOSIS — Z95.2 AORTIC VALVE PROSTHESIS PRESENT: ICD-10-CM

## 2023-10-27 DIAGNOSIS — I48.0 PAF (PAROXYSMAL ATRIAL FIBRILLATION) (H): Primary | ICD-10-CM

## 2023-10-27 DIAGNOSIS — I48.0 PAF (PAROXYSMAL ATRIAL FIBRILLATION) (H): ICD-10-CM

## 2023-10-27 LAB — INR BLD: 2.7 (ref 0.9–1.1)

## 2023-10-27 PROCEDURE — 85610 PROTHROMBIN TIME: CPT

## 2023-10-27 PROCEDURE — 36416 COLLJ CAPILLARY BLOOD SPEC: CPT

## 2023-10-27 NOTE — PROGRESS NOTES
ANTICOAGULATION MANAGEMENT     Todd S Aschoff 67 year old male is on warfarin with therapeutic INR result. (Goal INR 2.5-3.5)    Recent labs: (last 7 days)     10/27/23  1055   INR 2.7*       ASSESSMENT     Source(s): Chart Review and Patient/Caregiver Call     Warfarin doses taken: Warfarin taken as instructed  Diet: No new diet changes identified  Medication/supplement changes: None noted  New illness, injury, or hospitalization: No  Signs or symptoms of bleeding or clotting: No  Previous result: Therapeutic last 2(+) visits  Additional findings: None       PLAN     Recommended plan for no diet, medication or health factor changes affecting INR     Dosing Instructions: Continue your current warfarin dose with next INR in 4-5 weeks, patient elected to have recheck in 4 weeks      Summary  As of 10/27/2023      Full warfarin instructions:  7.5 mg every Tue, Fri; 5 mg all other days   Next INR check:  11/24/2023               Telephone call with Nicko who verbalizes understanding and agrees to plan    Lab visit scheduled    Education provided:   Please call back if any changes to your diet, medications or how you've been taking warfarin  Contact [phone]  with any changes, questions or concerns.     Plan made per [facility] anticoagulation protocol    Lucina Andrew RN  Anticoagulation Clinic  10/27/2023    _______________________________________________________________________     Anticoagulation Episode Summary       Current INR goal:  2.5-3.5   TTR:  70.8% (1 y)   Target end date:  Indefinite   Send INR reminders to:  AIDA GUERRERO    Indications    PAF (paroxysmal atrial fibrillation) (H) [I48.0]  Mechanical AV Replacement 1992 -- on Warfarin  [Z95.2]             Comments:               Anticoagulation Care Providers       Provider Role Specialty Phone number    Obdulio Ingram MD Referring Internal Medicine [phone]    Herve Morgan MD Referring Internal Medicine - Pediatrics [phone]

## 2023-10-31 ENCOUNTER — APPOINTMENT (OUTPATIENT)
Dept: CT IMAGING | Facility: CLINIC | Age: 68
DRG: 064 | End: 2023-10-31
Attending: STUDENT IN AN ORGANIZED HEALTH CARE EDUCATION/TRAINING PROGRAM
Payer: COMMERCIAL

## 2023-10-31 ENCOUNTER — OFFICE VISIT (OUTPATIENT)
Dept: URGENT CARE | Facility: URGENT CARE | Age: 68
End: 2023-10-31
Payer: COMMERCIAL

## 2023-10-31 ENCOUNTER — APPOINTMENT (OUTPATIENT)
Dept: MRI IMAGING | Facility: CLINIC | Age: 68
DRG: 064 | End: 2023-10-31
Attending: EMERGENCY MEDICINE
Payer: COMMERCIAL

## 2023-10-31 ENCOUNTER — HOSPITAL ENCOUNTER (INPATIENT)
Facility: CLINIC | Age: 68
LOS: 2 days | Discharge: HOME | DRG: 064 | End: 2023-11-02
Attending: EMERGENCY MEDICINE | Admitting: HOSPITALIST
Payer: COMMERCIAL

## 2023-10-31 VITALS
BODY MASS INDEX: 43.48 KG/M2 | WEIGHT: 314 LBS | DIASTOLIC BLOOD PRESSURE: 84 MMHG | TEMPERATURE: 97.5 F | SYSTOLIC BLOOD PRESSURE: 132 MMHG | HEART RATE: 64 BPM | OXYGEN SATURATION: 98 %

## 2023-10-31 DIAGNOSIS — G47.19 EXCESSIVE DAYTIME SLEEPINESS: Primary | ICD-10-CM

## 2023-10-31 DIAGNOSIS — Z79.01 LONG TERM CURRENT USE OF ANTICOAGULANT THERAPY: ICD-10-CM

## 2023-10-31 DIAGNOSIS — H91.92 HEARING LOSS OF LEFT EAR, UNSPECIFIED HEARING LOSS TYPE: ICD-10-CM

## 2023-10-31 DIAGNOSIS — R06.83 SNORING: ICD-10-CM

## 2023-10-31 DIAGNOSIS — I63.89 RIGHT TEMPORAL LOBE INFARCTION (H): ICD-10-CM

## 2023-10-31 DIAGNOSIS — R26.89 BALANCE PROBLEMS: ICD-10-CM

## 2023-10-31 DIAGNOSIS — Z86.73 CHRONIC CEREBRAL VENOUS INFARCTION: ICD-10-CM

## 2023-10-31 DIAGNOSIS — H91.92 ACUTE HEARING LOSS OF LEFT EAR: Primary | ICD-10-CM

## 2023-10-31 LAB
ANION GAP SERPL CALCULATED.3IONS-SCNC: 7 MMOL/L (ref 7–15)
BASOPHILS # BLD AUTO: 0 10E3/UL (ref 0–0.2)
BASOPHILS NFR BLD AUTO: 0 %
BUN SERPL-MCNC: 16.9 MG/DL (ref 8–23)
CALCIUM SERPL-MCNC: 9 MG/DL (ref 8.8–10.2)
CHLORIDE SERPL-SCNC: 102 MMOL/L (ref 98–107)
CREAT SERPL-MCNC: 0.95 MG/DL (ref 0.67–1.17)
DEPRECATED HCO3 PLAS-SCNC: 26 MMOL/L (ref 22–29)
EGFRCR SERPLBLD CKD-EPI 2021: 88 ML/MIN/1.73M2
EOSINOPHIL # BLD AUTO: 0.2 10E3/UL (ref 0–0.7)
EOSINOPHIL NFR BLD AUTO: 2 %
ERYTHROCYTE [DISTWIDTH] IN BLOOD BY AUTOMATED COUNT: 12.9 % (ref 10–15)
GLUCOSE BLDC GLUCOMTR-MCNC: 122 MG/DL (ref 70–99)
GLUCOSE SERPL-MCNC: 99 MG/DL (ref 70–99)
HBA1C MFR BLD: 5.7 %
HCT VFR BLD AUTO: 46.9 % (ref 40–53)
HGB BLD-MCNC: 16.2 G/DL (ref 13.3–17.7)
HOLD SPECIMEN: NORMAL
IMM GRANULOCYTES # BLD: 0 10E3/UL
IMM GRANULOCYTES NFR BLD: 0 %
INR PPP: 2.7 (ref 0.85–1.15)
LYMPHOCYTES # BLD AUTO: 1.9 10E3/UL (ref 0.8–5.3)
LYMPHOCYTES NFR BLD AUTO: 28 %
MCH RBC QN AUTO: 32.7 PG (ref 26.5–33)
MCHC RBC AUTO-ENTMCNC: 34.5 G/DL (ref 31.5–36.5)
MCV RBC AUTO: 95 FL (ref 78–100)
MONOCYTES # BLD AUTO: 0.6 10E3/UL (ref 0–1.3)
MONOCYTES NFR BLD AUTO: 8 %
NEUTROPHILS # BLD AUTO: 4.1 10E3/UL (ref 1.6–8.3)
NEUTROPHILS NFR BLD AUTO: 62 %
NRBC # BLD AUTO: 0 10E3/UL
NRBC BLD AUTO-RTO: 0 /100
PLATELET # BLD AUTO: 200 10E3/UL (ref 150–450)
POTASSIUM SERPL-SCNC: 4.5 MMOL/L (ref 3.4–5.3)
RBC # BLD AUTO: 4.95 10E6/UL (ref 4.4–5.9)
SODIUM SERPL-SCNC: 135 MMOL/L (ref 135–145)
TROPONIN T SERPL HS-MCNC: 39 NG/L
WBC # BLD AUTO: 6.8 10E3/UL (ref 4–11)

## 2023-10-31 PROCEDURE — 93005 ELECTROCARDIOGRAM TRACING: CPT

## 2023-10-31 PROCEDURE — 70553 MRI BRAIN STEM W/O & W/DYE: CPT

## 2023-10-31 PROCEDURE — 82962 GLUCOSE BLOOD TEST: CPT

## 2023-10-31 PROCEDURE — 83036 HEMOGLOBIN GLYCOSYLATED A1C: CPT | Performed by: HOSPITALIST

## 2023-10-31 PROCEDURE — A9585 GADOBUTROL INJECTION: HCPCS | Performed by: EMERGENCY MEDICINE

## 2023-10-31 PROCEDURE — 120N000001 HC R&B MED SURG/OB

## 2023-10-31 PROCEDURE — 70496 CT ANGIOGRAPHY HEAD: CPT

## 2023-10-31 PROCEDURE — 85610 PROTHROMBIN TIME: CPT | Performed by: EMERGENCY MEDICINE

## 2023-10-31 PROCEDURE — 36415 COLL VENOUS BLD VENIPUNCTURE: CPT | Performed by: EMERGENCY MEDICINE

## 2023-10-31 PROCEDURE — 80061 LIPID PANEL: CPT | Performed by: HOSPITALIST

## 2023-10-31 PROCEDURE — 250N000013 HC RX MED GY IP 250 OP 250 PS 637: Performed by: HOSPITALIST

## 2023-10-31 PROCEDURE — 99207 PR INPT ADMISSION FROM CLINIC: CPT | Performed by: PHYSICIAN ASSISTANT

## 2023-10-31 PROCEDURE — 84484 ASSAY OF TROPONIN QUANT: CPT | Performed by: HOSPITALIST

## 2023-10-31 PROCEDURE — 99222 1ST HOSP IP/OBS MODERATE 55: CPT | Performed by: HOSPITALIST

## 2023-10-31 PROCEDURE — 250N000009 HC RX 250: Performed by: EMERGENCY MEDICINE

## 2023-10-31 PROCEDURE — 70498 CT ANGIOGRAPHY NECK: CPT

## 2023-10-31 PROCEDURE — 255N000002 HC RX 255 OP 636: Performed by: EMERGENCY MEDICINE

## 2023-10-31 PROCEDURE — 82310 ASSAY OF CALCIUM: CPT | Performed by: EMERGENCY MEDICINE

## 2023-10-31 PROCEDURE — 250N000011 HC RX IP 250 OP 636: Performed by: EMERGENCY MEDICINE

## 2023-10-31 PROCEDURE — 85025 COMPLETE CBC W/AUTO DIFF WBC: CPT | Performed by: EMERGENCY MEDICINE

## 2023-10-31 PROCEDURE — 99285 EMERGENCY DEPT VISIT HI MDM: CPT | Mod: 25

## 2023-10-31 RX ORDER — PREGABALIN 25 MG/1
100 CAPSULE ORAL 3 TIMES DAILY
Status: DISCONTINUED | OUTPATIENT
Start: 2023-10-31 | End: 2023-11-02 | Stop reason: HOSPADM

## 2023-10-31 RX ORDER — AZELASTINE HYDROCHLORIDE, FLUTICASONE PROPIONATE 137; 50 UG/1; UG/1
1 SPRAY, METERED NASAL DAILY
COMMUNITY
End: 2024-03-18

## 2023-10-31 RX ORDER — DONEPEZIL HYDROCHLORIDE 10 MG/1
10 TABLET, FILM COATED ORAL AT BEDTIME
Status: DISCONTINUED | OUTPATIENT
Start: 2023-10-31 | End: 2023-11-02 | Stop reason: HOSPADM

## 2023-10-31 RX ORDER — SIMVASTATIN 10 MG
40 TABLET ORAL AT BEDTIME
Status: DISCONTINUED | OUTPATIENT
Start: 2023-10-31 | End: 2023-11-01

## 2023-10-31 RX ORDER — ONDANSETRON 4 MG/1
4 TABLET, ORALLY DISINTEGRATING ORAL EVERY 6 HOURS PRN
Status: DISCONTINUED | OUTPATIENT
Start: 2023-10-31 | End: 2023-11-02 | Stop reason: HOSPADM

## 2023-10-31 RX ORDER — WARFARIN SODIUM 7.5 MG/1
7.5 TABLET ORAL
Status: COMPLETED | OUTPATIENT
Start: 2023-10-31 | End: 2023-10-31

## 2023-10-31 RX ORDER — PROPAFENONE HYDROCHLORIDE 150 MG/1
150 TABLET, COATED ORAL EVERY 8 HOURS
Status: DISCONTINUED | OUTPATIENT
Start: 2023-10-31 | End: 2023-11-02 | Stop reason: HOSPADM

## 2023-10-31 RX ORDER — ONDANSETRON 2 MG/ML
4 INJECTION INTRAMUSCULAR; INTRAVENOUS EVERY 6 HOURS PRN
Status: DISCONTINUED | OUTPATIENT
Start: 2023-10-31 | End: 2023-11-02 | Stop reason: HOSPADM

## 2023-10-31 RX ORDER — LIDOCAINE 40 MG/G
CREAM TOPICAL
Status: DISCONTINUED | OUTPATIENT
Start: 2023-10-31 | End: 2023-11-02 | Stop reason: HOSPADM

## 2023-10-31 RX ORDER — LANOLIN ALCOHOL/MO/W.PET/CERES
400 CREAM (GRAM) TOPICAL DAILY
COMMUNITY

## 2023-10-31 RX ORDER — GADOBUTROL 604.72 MG/ML
14 INJECTION INTRAVENOUS ONCE
Status: COMPLETED | OUTPATIENT
Start: 2023-10-31 | End: 2023-10-31

## 2023-10-31 RX ORDER — IOPAMIDOL 755 MG/ML
500 INJECTION, SOLUTION INTRAVASCULAR ONCE
Status: COMPLETED | OUTPATIENT
Start: 2023-10-31 | End: 2023-10-31

## 2023-10-31 RX ORDER — DIAZEPAM 5 MG
5 TABLET ORAL
Status: DISCONTINUED | OUTPATIENT
Start: 2023-10-31 | End: 2023-11-02 | Stop reason: HOSPADM

## 2023-10-31 RX ADMIN — DONEPEZIL HYDROCHLORIDE 10 MG: 10 TABLET ORAL at 23:25

## 2023-10-31 RX ADMIN — IOPAMIDOL 67 ML: 755 INJECTION, SOLUTION INTRAVENOUS at 19:53

## 2023-10-31 RX ADMIN — PROPAFENONE HYDROCHLORIDE 150 MG: 150 TABLET, FILM COATED ORAL at 23:26

## 2023-10-31 RX ADMIN — SODIUM CHLORIDE 80 ML: 9 INJECTION, SOLUTION INTRAVENOUS at 19:53

## 2023-10-31 RX ADMIN — PREGABALIN 100 MG: 25 CAPSULE ORAL at 23:06

## 2023-10-31 RX ADMIN — WARFARIN SODIUM 7.5 MG: 7.5 TABLET ORAL at 23:05

## 2023-10-31 RX ADMIN — GADOBUTROL 14 ML: 604.72 INJECTION INTRAVENOUS at 18:59

## 2023-10-31 RX ADMIN — SIMVASTATIN 40 MG: 10 TABLET, FILM COATED ORAL at 23:06

## 2023-10-31 ASSESSMENT — ACTIVITIES OF DAILY LIVING (ADL)
ADLS_ACUITY_SCORE: 37

## 2023-10-31 ASSESSMENT — PAIN SCALES - GENERAL: PAINLEVEL: NO PAIN (0)

## 2023-10-31 NOTE — ED TRIAGE NOTES
Pt arrives for a sudden loss of hearing in his L ear that began yesterday afternoon around 4PM. Was seen in  and had a negative workup- referred here for an MRI. ABCs intact.     BP (!) 149/93   Pulse 64   Temp 98.6  F (37  C) (Temporal)   Resp 20   SpO2 100%        Triage Assessment (Adult)       Row Name 10/31/23 1518          Triage Assessment    Airway WDL WDL        Respiratory WDL    Respiratory WDL WDL        Cardiac WDL    Cardiac WDL WDL        Cognitive/Neuro/Behavioral WDL    Cognitive/Neuro/Behavioral WDL WDL

## 2023-10-31 NOTE — PROGRESS NOTES
Assessment & Plan     Acute hearing loss of left ear    Patient presents with acute hearing loss, left ear , 100% loss  This occurred last week and then came back and now is gone again    Tried to get pt in with ENT but no availability to follow up this acute hearing loss    Balance problems    Due to having previous small infarcts and pt having acute hearing loss with new balance issues I have sent him to the ED for MRI evaluation of his balance and ENT consult for 100% hearing loss    Chronic cerebral venous infarction    Noted in notes on prior MRI  Advised to go to the ED for MRI/MRA to evaluate for any new strokes    Long term current use of anticoagulant therapy    Patient is on coumadin  Noted in chart    Review of external notes as documented elsewhere in note       CONSULTATION/REFERRAL to ED now in stable condition    No follow-ups on file.    Yossi Ya, [facility], PA-C  [facility] YOLANDA Maharaj is a 67 year old, presenting for the following health issues:  Urgent Care (Pt states that he can't hear out of his left ear, right ear is ringing, balance is off.)      HPI   Review of Systems   Constitutional, HEENT, cardiovascular, pulmonary, GI, , musculoskeletal, neuro, skin, endocrine and psych systems are negative, except as otherwise noted.      Objective    /84 (BP Location: Right arm, Patient Position: Sitting, Cuff Size: Adult Large)   Pulse 64   Temp 97.5  F (36.4  C) (Oral)   Wt 142.4 kg (314 lb)   SpO2 98%   BMI 43.48 kg/m    Body mass index is 43.48 kg/m .  Physical Exam   GENERAL: healthy, alert and no distress  EYES: Eyes grossly normal to inspection, PERRL and conjunctivae and sclerae normal  HENT: ear canals and TM's normal, nose and mouth without ulcers or lesions  NECK: no adenopathy, no asymmetry, masses, or scars and thyroid normal to palpation  RESP: lungs clear to auscultation - no rales, rhonchi or wheezes  CV: regular rate and rhythm, normal  S1 S2, no S3 or S4, no murmur, click or rub, no peripheral edema and peripheral pulses strong  MS: no gross musculoskeletal defects noted, no edema  SKIN: no suspicious lesions or rashes  NEURO: Normal strength and tone, mentation intact and speech normal. Positive for mild balance issues and falling to the left side  PSYCH: mentation appears normal, affect normal/bright

## 2023-11-01 ENCOUNTER — APPOINTMENT (OUTPATIENT)
Dept: PHYSICAL THERAPY | Facility: CLINIC | Age: 68
DRG: 064 | End: 2023-11-01
Attending: HOSPITALIST
Payer: COMMERCIAL

## 2023-11-01 ENCOUNTER — APPOINTMENT (OUTPATIENT)
Dept: CT IMAGING | Facility: CLINIC | Age: 68
DRG: 064 | End: 2023-11-01
Attending: PHYSICIAN ASSISTANT
Payer: COMMERCIAL

## 2023-11-01 ENCOUNTER — APPOINTMENT (OUTPATIENT)
Dept: CARDIOLOGY | Facility: CLINIC | Age: 68
DRG: 064 | End: 2023-11-01
Attending: HOSPITALIST
Payer: COMMERCIAL

## 2023-11-01 LAB
ANION GAP SERPL CALCULATED.3IONS-SCNC: 5 MMOL/L (ref 7–15)
ATRIAL RATE - MUSE: 66 BPM
BUN SERPL-MCNC: 16.8 MG/DL (ref 8–23)
CALCIUM SERPL-MCNC: 8.8 MG/DL (ref 8.8–10.2)
CHLORIDE SERPL-SCNC: 103 MMOL/L (ref 98–107)
CHOLEST SERPL-MCNC: 160 MG/DL
CREAT SERPL-MCNC: 0.95 MG/DL (ref 0.67–1.17)
DEPRECATED HCO3 PLAS-SCNC: 29 MMOL/L (ref 22–29)
DIASTOLIC BLOOD PRESSURE - MUSE: NORMAL MMHG
EGFRCR SERPLBLD CKD-EPI 2021: 88 ML/MIN/1.73M2
ERYTHROCYTE [DISTWIDTH] IN BLOOD BY AUTOMATED COUNT: 13.1 % (ref 10–15)
GLUCOSE BLDC GLUCOMTR-MCNC: 103 MG/DL (ref 70–99)
GLUCOSE BLDC GLUCOMTR-MCNC: 114 MG/DL (ref 70–99)
GLUCOSE BLDC GLUCOMTR-MCNC: 84 MG/DL (ref 70–99)
GLUCOSE BLDC GLUCOMTR-MCNC: 99 MG/DL (ref 70–99)
GLUCOSE SERPL-MCNC: 95 MG/DL (ref 70–99)
HCT VFR BLD AUTO: 45.4 % (ref 40–53)
HDLC SERPL-MCNC: 42 MG/DL
HGB BLD-MCNC: 15.4 G/DL (ref 13.3–17.7)
INR PPP: 3.02 (ref 0.85–1.15)
INR PPP: 3.09 (ref 0.85–1.15)
INTERPRETATION ECG - MUSE: NORMAL
LDLC SERPL CALC-MCNC: 93 MG/DL
LVEF ECHO: NORMAL
MCH RBC QN AUTO: 33.2 PG (ref 26.5–33)
MCHC RBC AUTO-ENTMCNC: 33.9 G/DL (ref 31.5–36.5)
MCV RBC AUTO: 98 FL (ref 78–100)
NONHDLC SERPL-MCNC: 118 MG/DL
P AXIS - MUSE: 66 DEGREES
PLATELET # BLD AUTO: 174 10E3/UL (ref 150–450)
POTASSIUM SERPL-SCNC: 4.4 MMOL/L (ref 3.4–5.3)
PR INTERVAL - MUSE: 188 MS
QRS DURATION - MUSE: 102 MS
QT - MUSE: 414 MS
QTC - MUSE: 434 MS
R AXIS - MUSE: -42 DEGREES
RBC # BLD AUTO: 4.64 10E6/UL (ref 4.4–5.9)
SODIUM SERPL-SCNC: 137 MMOL/L (ref 135–145)
SYSTOLIC BLOOD PRESSURE - MUSE: NORMAL MMHG
T AXIS - MUSE: 73 DEGREES
TRIGL SERPL-MCNC: 123 MG/DL
VENTRICULAR RATE- MUSE: 66 BPM
WBC # BLD AUTO: 5.5 10E3/UL (ref 4–11)

## 2023-11-01 PROCEDURE — 120N000001 HC R&B MED SURG/OB

## 2023-11-01 PROCEDURE — 74177 CT ABD & PELVIS W/CONTRAST: CPT

## 2023-11-01 PROCEDURE — 250N000013 HC RX MED GY IP 250 OP 250 PS 637: Performed by: HOSPITALIST

## 2023-11-01 PROCEDURE — 97161 PT EVAL LOW COMPLEX 20 MIN: CPT | Mod: GP | Performed by: PHYSICAL THERAPIST

## 2023-11-01 PROCEDURE — 97116 GAIT TRAINING THERAPY: CPT | Mod: GP | Performed by: PHYSICAL THERAPIST

## 2023-11-01 PROCEDURE — 97112 NEUROMUSCULAR REEDUCATION: CPT | Mod: GP | Performed by: PHYSICAL THERAPIST

## 2023-11-01 PROCEDURE — 85610 PROTHROMBIN TIME: CPT | Performed by: HOSPITALIST

## 2023-11-01 PROCEDURE — 250N000013 HC RX MED GY IP 250 OP 250 PS 637: Performed by: PHYSICIAN ASSISTANT

## 2023-11-01 PROCEDURE — 999N000226 HC STATISTIC SLP IP EVAL DEFER

## 2023-11-01 PROCEDURE — 93308 TTE F-UP OR LMTD: CPT | Mod: 26 | Performed by: INTERNAL MEDICINE

## 2023-11-01 PROCEDURE — 85027 COMPLETE CBC AUTOMATED: CPT | Performed by: HOSPITALIST

## 2023-11-01 PROCEDURE — G0426 INPT/ED TELECONSULT50: HCPCS | Mod: G0 | Performed by: PHYSICIAN ASSISTANT

## 2023-11-01 PROCEDURE — 93321 DOPPLER ECHO F-UP/LMTD STD: CPT | Mod: 26 | Performed by: INTERNAL MEDICINE

## 2023-11-01 PROCEDURE — 36415 COLL VENOUS BLD VENIPUNCTURE: CPT | Performed by: HOSPITALIST

## 2023-11-01 PROCEDURE — 255N000002 HC RX 255 OP 636: Performed by: EMERGENCY MEDICINE

## 2023-11-01 PROCEDURE — 93325 DOPPLER ECHO COLOR FLOW MAPG: CPT

## 2023-11-01 PROCEDURE — 999N000111 HC STATISTIC OT IP EVAL DEFER: Performed by: REHABILITATION PRACTITIONER

## 2023-11-01 PROCEDURE — C8924 2D TTE W OR W/O FOL W/CON,FU: HCPCS

## 2023-11-01 PROCEDURE — 250N000013 HC RX MED GY IP 250 OP 250 PS 637: Performed by: INTERNAL MEDICINE

## 2023-11-01 PROCEDURE — 250N000009 HC RX 250: Performed by: PHYSICIAN ASSISTANT

## 2023-11-01 PROCEDURE — 99232 SBSQ HOSP IP/OBS MODERATE 35: CPT | Performed by: INTERNAL MEDICINE

## 2023-11-01 PROCEDURE — 82962 GLUCOSE BLOOD TEST: CPT

## 2023-11-01 PROCEDURE — 250N000011 HC RX IP 250 OP 636: Performed by: PHYSICIAN ASSISTANT

## 2023-11-01 PROCEDURE — 93325 DOPPLER ECHO COLOR FLOW MAPG: CPT | Mod: 26 | Performed by: INTERNAL MEDICINE

## 2023-11-01 PROCEDURE — 80048 BASIC METABOLIC PNL TOTAL CA: CPT | Performed by: HOSPITALIST

## 2023-11-01 RX ORDER — WARFARIN SODIUM 5 MG/1
5 TABLET ORAL
Status: COMPLETED | OUTPATIENT
Start: 2023-11-01 | End: 2023-11-01

## 2023-11-01 RX ORDER — LOPERAMIDE HCL 2 MG
2 CAPSULE ORAL 4 TIMES DAILY PRN
Status: DISCONTINUED | OUTPATIENT
Start: 2023-11-01 | End: 2023-11-02 | Stop reason: HOSPADM

## 2023-11-01 RX ORDER — IOPAMIDOL 755 MG/ML
500 INJECTION, SOLUTION INTRAVASCULAR ONCE
Status: COMPLETED | OUTPATIENT
Start: 2023-11-01 | End: 2023-11-01

## 2023-11-01 RX ORDER — ACETAMINOPHEN 500 MG
1000 TABLET ORAL 2 TIMES DAILY
Status: DISCONTINUED | OUTPATIENT
Start: 2023-11-01 | End: 2023-11-02 | Stop reason: HOSPADM

## 2023-11-01 RX ORDER — ATORVASTATIN CALCIUM 20 MG/1
40 TABLET, FILM COATED ORAL EVERY EVENING
Status: DISCONTINUED | OUTPATIENT
Start: 2023-11-01 | End: 2023-11-02 | Stop reason: HOSPADM

## 2023-11-01 RX ADMIN — PROPAFENONE HYDROCHLORIDE 150 MG: 150 TABLET, FILM COATED ORAL at 22:05

## 2023-11-01 RX ADMIN — DONEPEZIL HYDROCHLORIDE 10 MG: 10 TABLET ORAL at 21:20

## 2023-11-01 RX ADMIN — PROPAFENONE HYDROCHLORIDE 150 MG: 150 TABLET, FILM COATED ORAL at 09:10

## 2023-11-01 RX ADMIN — ATORVASTATIN CALCIUM 40 MG: 20 TABLET, FILM COATED ORAL at 21:19

## 2023-11-01 RX ADMIN — SODIUM CHLORIDE 100 ML: 9 INJECTION, SOLUTION INTRAVENOUS at 13:04

## 2023-11-01 RX ADMIN — HUMAN ALBUMIN MICROSPHERES AND PERFLUTREN 3 ML: 10; .22 INJECTION, SOLUTION INTRAVENOUS at 07:53

## 2023-11-01 RX ADMIN — IOPAMIDOL 100 ML: 755 INJECTION, SOLUTION INTRAVENOUS at 13:04

## 2023-11-01 RX ADMIN — ACETAMINOPHEN 1000 MG: 500 TABLET, FILM COATED ORAL at 10:54

## 2023-11-01 RX ADMIN — PREGABALIN 100 MG: 25 CAPSULE ORAL at 16:12

## 2023-11-01 RX ADMIN — PREGABALIN 100 MG: 25 CAPSULE ORAL at 21:19

## 2023-11-01 RX ADMIN — PROPAFENONE HYDROCHLORIDE 150 MG: 150 TABLET, FILM COATED ORAL at 16:13

## 2023-11-01 RX ADMIN — LEVOTHYROXINE SODIUM 175 MCG: 0.17 TABLET ORAL at 09:10

## 2023-11-01 RX ADMIN — LOPERAMIDE HYDROCHLORIDE 2 MG: 2 CAPSULE ORAL at 21:20

## 2023-11-01 RX ADMIN — VENLAFAXINE HYDROCHLORIDE 225 MG: 150 CAPSULE, EXTENDED RELEASE ORAL at 09:10

## 2023-11-01 RX ADMIN — ACETAMINOPHEN 1000 MG: 500 TABLET, FILM COATED ORAL at 21:19

## 2023-11-01 RX ADMIN — WARFARIN SODIUM 5 MG: 5 TABLET ORAL at 19:00

## 2023-11-01 RX ADMIN — PREGABALIN 100 MG: 25 CAPSULE ORAL at 09:09

## 2023-11-01 ASSESSMENT — ACTIVITIES OF DAILY LIVING (ADL)
ADLS_ACUITY_SCORE: 37
ADLS_ACUITY_SCORE: 37
ADLS_ACUITY_SCORE: 24
ADLS_ACUITY_SCORE: 37
ADLS_ACUITY_SCORE: 37
ADLS_ACUITY_SCORE: 24
ADLS_ACUITY_SCORE: 37
ADLS_ACUITY_SCORE: 37
ADLS_ACUITY_SCORE: 24

## 2023-11-01 NOTE — PLAN OF CARE
"OT: Orders received. Chart reviewed and discussed with care team.  OT not indicated, per PT, pt \"presents close to baseline with mobility.  Pt currently independent with bed mobility and transfers from a higher surface and ambulated 200' without a device with SBA.  Pt demonstrates gait impairments due to prior L LE weakness and pain from previous AMANDA and hematoma.  Pt normally wears a 3/8\" L heel lift in his shoe that has gone missing during hospital stay.  Pt able to negotiate stairs to access all levels of his home\".  There are no deficits with ADls, UE function, vision or cognition. Defer discharge recommendations to care team.  Will complete orders.    "

## 2023-11-01 NOTE — ED NOTES
"[facility]  ED Nurse Handoff Report    ED Chief complaint: Hearing Problem  . ED Diagnosis:   Final diagnoses:   Hearing loss of left ear, unspecified hearing loss type   Right temporal lobe infarction (H)       Allergies:   Allergies   Allergen Reactions    Blood Transfusion Related (Informational Only) Other (See Comments)     Patient has a history of a clinically significant antibody against RBC antigens.  A delay in compatible RBCs may occur.      Ciprofloxacin      \"Went nuts\"    Gabapentin      Severe behavioral disturbances       Code Status: Full Code    Activity level - Baseline/Home:  independent.  Activity Level - Current:   independent.   Lift room needed: No.   Bariatric: No   Needed: No   Isolation: No.   Infection: Not Applicable.     Respiratory status: Room air    Vital Signs (within 30 minutes):   Vitals:    10/31/23 1514 10/31/23 1645 10/31/23 2058 10/31/23 2115   BP: (!) 149/93 107/69 105/81 115/75   Pulse: 64 64  63   Resp: 20      Temp: 98.6  F (37  C)      TempSrc: Temporal      SpO2: 100% 92% 96% 98%       Cardiac Rhythm:  ,      Pain level:    Patient confused: No.   Patient Falls Risk: patient and family education.   Elimination Status: Has voided     Patient Report - Initial Complaint: Hearing loss left ear.   Focused Assessment: Neuro, resp, cards     Abnormal Results:   Labs Ordered and Resulted from Time of ED Arrival to Time of ED Departure   INR - Abnormal       Result Value    INR 2.70 (*)    BASIC METABOLIC PANEL - Normal    Sodium 135      Potassium 4.5      Chloride 102      Carbon Dioxide (CO2) 26      Anion Gap 7      Urea Nitrogen 16.9      Creatinine 0.95      GFR Estimate 88      Calcium 9.0      Glucose 99     CBC WITH PLATELETS AND DIFFERENTIAL    WBC Count 6.8      RBC Count 4.95      Hemoglobin 16.2      Hematocrit 46.9      MCV 95      MCH 32.7      MCHC 34.5      RDW 12.9      Platelet Count 200      % Neutrophils 62      % Lymphocytes " 28      % Monocytes 8      % Eosinophils 2      % Basophils 0      % Immature Granulocytes 0      NRBCs per 100 WBC 0      Absolute Neutrophils 4.1      Absolute Lymphocytes 1.9      Absolute Monocytes 0.6      Absolute Eosinophils 0.2      Absolute Basophils 0.0      Absolute Immature Granulocytes 0.0      Absolute NRBCs 0.0          CTA Head Neck with Contrast   Final Result   IMPRESSION:    HEAD CTA:    1.  Short segment severe stenosis within the distal posterior cerebral artery on the left, new compared to the 06/18/2021 prior.      NECK CTA:   1.  Mild atherosclerotic plaque both carotid bifurcations/bulbs without significant stenosis either cervical carotid or vertebral system by modified NASCET criteria.      MR Brain w/o & w Contrast   Final Result   IMPRESSION:   1.  Punctate late acute versus early subacute right posterior temporal infarct.   2.  No mass effect or hemorrhage.   3.  Chronic multifocal infarcts are not significantly changed since 06/19/2021.      ALISSON Antunez was contacted by me on 10/31/2023 7:09 PM CDT.          Treatments provided: Imaging  Family Comments: None  OBS brochure/video discussed/provided to patient:  Yes  ED Medications:   Medications   gadobutrol (GADAVIST) injection 14 mL (14 mLs Intravenous $Given 10/31/23 1859)   CT Scan Flush (80 mLs Intravenous $Given 10/31/23 1953)   iopamidol (ISOVUE-370) solution 500 mL (67 mLs Intravenous $Given 10/31/23 1953)       Drips infusing:  No  For the majority of the shift this patient was Green.   Interventions performed were Comfort.    Sepsis treatment initiated: No    Cares/treatment/interventions/medications to be completed following ED care: None    ED Nurse Name: Aric Sheehan RN  9:23 PM    RECEIVING UNIT ED HANDOFF REVIEW    Above ED Nurse Handoff Report was reviewed: Yes  Reviewed by: Mita Jiménez RN on November 1, 2023 at 11:20 AM

## 2023-11-01 NOTE — PROGRESS NOTES
"   11/01/23 0800   Appointment Info   Signing Clinician's Name / Credentials (PT) Vicki Littlejohn, PT   Living Environment   People in Home spouse   Current Living Arrangements house   Home Accessibility stairs to enter home;stairs within home   Number of Stairs, Main Entrance   (0 through garage, steps at front door (doesn't use))   Number of Stairs, Within Home, Primary seven  (7 + 7)   Stair Railings, Within Home, Primary railing on left side (ascending);railing on right side (ascending)   Transportation Anticipated family or friend will provide;car, drives self   Living Environment Comments Pt lives with wife in a split level home.  Pt reports he enters through the garage with flat entry. Pt also has front entry with steps.  Pt reports 7 + 7 steps up to bedroom with single rail on R then L.   Self-Care   Usual Activity Tolerance good   Current Activity Tolerance moderate   Regular Exercise Yes   Activity/Exercise Type walking   Exercise Amount/Frequency daily   Equipment Currently Used at Home cane, straight;walker, rolling   Fall history within last six months yes   Number of times patient has fallen within last six months 4   Activity/Exercise/Self-Care Comment Pt reports ambulating without a device and being independent with all ADLs and IADLs. Pt reports being retired (was a ).  Pt helps with all the household chores: vacuuming, cooking, cleaning, dishes.  Pt states that he has had 4 falls recently due to new carpeting on the stairs that is slippery.  Pt reports carpeting has matted down and he stopped wearing socks to make the stairs less slippery.  Pt reports having canes and walkers at home from his prior B hip surgeries.  Pt also wears a 3/8\" heel lift in his L shoe.   General Information   Onset of Illness/Injury or Date of Surgery 10/31/23   Referring Physician Tarun Caruso   Patient/Family Therapy Goals Statement (PT) Pt agreeable to recommendations for using L heel lift and SEC   Pertinent " History of Current Problem (include personal factors and/or comorbidities that impact the POC) Todd S Aschoff is a 67 year old male with a past medical history of thoracic aortic dissection status postrepair, mechanical aortic valve on warfarin, A-fib, hyperlipidemia, hypothyroidism, BPH, chronic back pain who presents for acute hearing loss L ear. MRI of the brain showed punctate late acute versus early subacute right posterior temporal infarct without any mass effect along with chronic multifocal infarcts. His vessel imaging showed severe stenosis within the distal posterior cerebral artery on the left that was new compared to June 2021 along with mild atherosclerotic plaque within both carotid bifurcations.   Existing Precautions/Restrictions fall   Heart Disease Risk Factors Dislipidemia;Medical history;Gender   General Observations Pt lying in bed on RA.   Cognition   Affect/Mental Status (Cognition) WFL;low arousal/lethargic   Orientation Status (Cognition) oriented x 4   Cognitive Status Comments Pt sleeping upon arrival.  Pt initially very lethargic, falling asleep during questioning. Alertness improved during eval and when upright.  Decreased hearing on L side due to CVA   Pain Assessment   Patient Currently in Pain Yes, see Vital Sign flowsheet  (L shld pain (pt reports needing a new L shld) 4/10, L LE pain from hematoma)   Integumentary/Edema   Integumentary/Edema no deficits were identifed   Integumentary/Edema Comments see nursing notes for details   Posture    Posture Forward head position;Protracted shoulders   Range of Motion (ROM)   Range of Motion ROM deficits secondary to surgical procedure   ROM Comment limited LE and core ROM due to multiple  hip and back surgeries.  Limited B hip and trunk flex   Strength (Manual Muscle Testing)   Strength Comments Decreased L hip flexion 3+/5 (pt reports prior weakness following L AMANDA), R hip flex 5/5, B knee ext and ankle DF 5/5.  B UEs grossly 4+-5/5 all  "major mms groups.  L shld slightly weaker due to pain and need for TSA (per pt report)   Bed Mobility   Bed Mobility supine-sit   Supine-Sit [place] (Bed Mobility) independent   Comment, (Bed Mobility) sup > sit independent without rails   Transfers   Transfers sit-stand transfer   Sit-Stand Transfer   Sit-Stand [place] (Transfers) independent   Comment, (Sit-Stand Transfer) Sit > stand from plinth with ease   Gait/Stairs (Locomotion)   [place] Level (Gait) supervision   Distance in Feet (Gait) 50' (eval)+ 200' x 2 (treatment)   Comment, (Gait/Stairs) Pt amb without AD with decreased L step length and stance time   Balance   Balance Comments Slight imbalance with gait due to pt missing his L 3/8\" heel lift  (wears in shoe).  Slight upper trunk lurch over L stance leg but no overt LOB   Sensory Examination   Sensory Perception Comments Pt reports numbness B feet at baseline   Coordination   Coordination no deficits were identified. Pt able to perform B heel to shin with symmetry (pt needing to place heel below B tibial tuberosities due to decreased B hip ROM. Pt able to perform B finger to nose with symmetry and accuracy.    Muscle Tone   Muscle Tone no deficits were identified   Clinical Impression   Criteria for Skilled Therapeutic Intervention Yes, treatment indicated   PT Diagnosis (PT) impaired gait   Influenced by the following impairments decreased L LE strength, impaired balance, L LE pain   Functional limitations due to impairments Impaired gait, difficulty with transfers from lower surface   Clinical Presentation (PT Evaluation Complexity) evolving   Clinical Presentation Rationale Pt with decreased hearing in L ear but resolution of other sxs from CVA   Clinical Decision Making (Complexity) low complexity   Planned Therapy Interventions (PT) balance training;gait training;home exercise program;patient/family education;stair training;strengthening;transfer training;progressive " "activity/exercise;risk factor education;home program guidelines   Risk & Benefits of therapy have been explained evaluation/treatment results reviewed;care plan/treatment goals reviewed;risks/benefits reviewed;current/potential barriers reviewed;participants voiced agreement with care plan;participants included;patient   PT Total Evaluation Time   PT Yolanda Low Complexity Minutes ([id number]) 15   Physical Therapy Goals   PT Frequency Daily   PT Predicted Duration/Target Date for Goal Attainment 11/02/23   PT Goals Transfers;Gait;Stairs;PT Goal 1   PT: Transfers Independent;Sit to/from stand  (from all surfaces)   PT: Gait Modified independent;Straight cane;Greater than 200 feet   PT: Stairs Independent;Greater than 10 stairs;Rail on right   PT: Goal 1 Pt will score >/= 20 on the DGI or > 45 on the [place] to demonstrate decreased falls risk.   PT Discharge Planning   PT Plan gait with R SEC and tennis shoes (L 3/8\" heel lift if available), formal balance assessment   PT Discharge Recommendation (DC Rec) home with outpatient physical therapy   PT Rationale for DC Rec Pt presents close to baseline with mobility.  Pt currently independent with bed mobility and transfers from a higher surface and ambulated 200' without a device with SBA.  Pt demonstrates gait impairments due to prior L LE weakness and pain from previous AMANDA and hematoma.  Pt normally wears a 3/8\" L heel lift in his shoe that has gone missing during hospital stay.  Pt able to negotiate stairs to access all levels of his home.  Recommend pt return home with OP PT to address L LE weakness and progress gait dynamics and balance.   PT Brief overview of current status SBA amb without a walker   PT Equipment Needed at Discharge   (L heel lift)     "

## 2023-11-01 NOTE — CONSULTS
"[facility]    Stroke Telephone Note    I was called by Simone Willis on 10/31/23 regarding patient Todd S Aschoff. The patient is a 67 year old male on Warfarin with history of paroxysmal atrial fibrillation, hypertension, and hyperlipidemia who presents with left sided hearing loss since 4 PM. MRI completed as work up showed  Punctate late acute versus early subacute right posterior temporal infarct    /69   Pulse 64   Temp 98.6  F (37  C) (Temporal)   Resp 20   SpO2 92%      Imaging Findings    CTA head/neck:    Short segment severe stenosis within the distal posterior cerebral artery on the left, new compared to the 06/18/2021 prior.     NECK CTA:  1.  Mild atherosclerotic plaque both carotid bifurcations/bulbs without significant stenosis either cervical carotid or vertebral system by modified NASCET criteria.    Impression  Acute ischemic stroke of R hemisphere due to undetermined etiology       Recommendations  - Use orderset: \"Ischemic Stroke Routine Admission\" or \"Ischemic Stroke No Thrombolytics/No Thrombectomy ICU Admission\"  - Neurochecks and Vital Signs every 4 hours   - Permissive HTN; goal SBP < 220 mmHg  - Continue anticoagulation  - Statin: PTA statin  - TTE (with Bubble Study if age 60 yrs or less)  - Telemetry, EKG  - Bedside Glucose Monitoring  - A1c, Lipid Panel, Troponin x 3  - PT/OT/SLP  - Stroke Education  - Euthermia, Euglycemia        My recommendations are based on the information provided over the phone by Todd S Aschoff's in-person providers. They are not intended to replace the clinical judgment of his in-person providers. I was not requested to personally see or examine the patient at this time.    Anil Garcia MD  Vascular Neurology    To page me or covering stroke neurology team member, click here: [facility]  Choose \"On Call\" tab at top, then select \"NEUROLOGY/ALL SITES\" from middle drop-down box, press Enter, then look for \"stroke\" or " "\"telestroke\" for your site.           "

## 2023-11-01 NOTE — H&P
[facility]    History and Physical  Hospitalist       Date of Admission:  10/31/2023    Assessment & Plan   Todd S Aschoff is a 67 year old male with a past medical history of thoracic aortic dissection status postrepair, mechanical aortic valve on warfarin, A-fib, hyperlipidemia, hypothyroidism, BPH, chronic back pain who presents for hearing loss.    #Acute/subacute CVA. Severe stenosis of left posterior cerebral artery: Patient was seen initially in urgent care earlier today for acute hearing loss of the left ear.  Occurred last week that lasted for 4 hours.  He notes that his hearing came back and was normal but then at 1600 on 10/30 he lost hearing in his left ear again.  He denies any loss of vision.  No slurred speech or word finding difficulties.  He denies any unilateral weakness.  He denies any chest pain or palpitations.  No headache.  No fevers or chills or recent illness.  He tried to get in with ENT but there is no availability.  He went to urgent care earlier today and there was concern for CVA so he was sent to the ER for evaluation.  -ER, patient afebrile and nontachycardic.  Normotensive to mildly hypertensive.  Saturating well on room air.  CBC unremarkable.  BMP unremarkable.  INR therapeutic at 2.70.  EKG with sinus rhythm without clear ischemic changes.  MRI of the brain showed punctate late acute versus early subacute right posterior temporal infarct without any mass effect along with chronic multifocal infarcts.  His vessel imaging showed severe stenosis within the distal posterior cerebral artery on the left that was new compared to June 2021 along with mild atherosclerotic plaque within both carotid bifurcations.  -Ischemic stroke admission order set utilized.  -Neurology consulted.  Appreciate recommendations.  -Monitor on telemetry.  TTE ordered.  -Therapies consulted  -We will continue warfarin, pharmacy to dose with goal INR 2.5-3.5 given history of atrial  fibrillation with mechanical aortic valve.  Continue home simvastatin.    -We will hold his lisinopril and allow for permissive hypertension tonight.  -Check lipid profile and A1c.    #History of afib. History of mechanical aortic valve: Currently in sinus rhythm.  We will continue his home propafenone.  Continue warfarin, pharmacy to dose.  #HL: Continue statin  #Hypothyroidism: Continue home LT4  #Chronic back pain. 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  Confirmed on admission  Dispo: Admit to inpatient    Tarun Caruso MD    Primary Care Physician   Herve Morgan    Chief Complaint   Hearing loss    History is obtained from the patient, patient's chart and discussed with ER physician    History of Present Illness   Todd S Aschoff is a 67 year old male with a past medical history of thoracic aortic dissection status postrepair, mechanical aortic valve on warfarin, A-fib, hyperlipidemia, hypothyroidism, BPH, chronic back pain who presents for hearing loss.    Patient was seen initially in urgent care earlier today for acute hearing loss of the left ear.  Occurred last week that lasted for 4 hours.  He notes that his hearing came back and was normal but then at 1600 on 10/30 he lost hearing in his left ear again.  He denies any loss of vision.  No slurred speech or word finding difficulties.  He denies any unilateral weakness.  He denies any chest pain or palpitations.  No headache.  No fevers or chills or recent illness.  He tried to get in with ENT but there is no availability.  He went to urgent care earlier today and there was concern for CVA so he was sent to the ER for evaluation.    In the ER, patient afebrile and  nontachycardic.  Normotensive to mildly hypertensive.  Saturating well on room air.  CBC unremarkable.  BMP unremarkable.  INR therapeutic at 2.70.  EKG with sinus rhythm without clear ischemic changes.  MRI of the brain showed punctate late acute versus early subacute right posterior temporal infarct without any mass effect along with chronic multifocal infarcts.  His vessel imaging showed severe stenosis within the distal posterior cerebral artery on the left that was new compared to June 2021 along with mild atherosclerotic plaque within both carotid bifurcations.    Past Medical History    I have reviewed this patient's medical history and updated it with pertinent information if needed.   Past Medical History:   Diagnosis Date    Advanced directives, counseling/discussion 1/6/2012    Discussed Advance Directive planning with patient; information given to patient to review.    Alcohol dependence (H)     Alcohol dependence in remission (H) 8/2/2018    Allergy, unspecified not elsewhere classified     seasonal    Anemia, unspecified type 1/22/2021    Aortic valve prosthesis present 2/8/2021    Atrial fibrillation (H)     Benign prostatic hyperplasia 2/8/2021    Bilateral thoracic back pain 11/16/2016    BPH (benign prostatic hypertrophy)     Chronic atrial fibrillation (H) 8/7/2002    Chronic infection     low back wound incision not healing     Chronic low back pain 8/19/2011    Chronic pain     lower back and right leg and left leg    Depressive disorder 6/9/2010    Depression  NOS    Dissection of aorta, thoracic (H) 1992    [employer] [facility] aotic valve + arch graft 1992    Elevated prostate specific antigen (PSA) 1/22/2020    Family history of prostate cancer 1/22/2020    Generalized muscle weakness 1/22/2021    Headache(784.0)     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 Provider to review    Leg wound, left 1/9/2021    Long term current use of anticoagulant therapy 8/7/2002    LW Modifier:  Coumadin, since mechanical aortic valve LW Onset:  1992 ; Anticoagulant Rx Long Term Problem list name updated by automated process. Provider to review    Low back pain     Lumbar radiculopathy 4/3/2013    Lumbar surgical wound fluid collection 5/23/2013    Major depression     Memory difficulties 1/16/2015    Mixed hyperlipidemia     Morbid obesity (H) 4/20/2010    LW Modifier:  BMI 41.3 (Apr 2010) ; Obesity Morbid    Numbness and tingling     right leg post surg rightt hip/ also left leg since surg    OA (osteoarthritis)     hips    OAB (overactive bladder) 5/11/2015    Obesity, unspecified     Persons encountering health services in other specified circumstances 4/3/2012    Formatting of this note might be different from the original. EMERGENCY CARE PLAN Presenting Problem Signs and Symptoms Treatment Plan   Questions or conerns during clinic hours    I will call the clinic directly    Questions or conerns outside clinic hours    I will call the 24 hour nurse line at [phone]   Patient needs to schedule an appointment    I will call the 24 hour scheduling team at    Polypharmacy 1/22/2021    Prostate infection     Radiculitis 4/20/2010    LW Modifier:  Right foot, s/p R AMANDA LW Onset:  2002 ; Neuralgia    Recurrent major depressive episodes, in full remission (H24) 10/30/2012    Renal insufficiency 1/22/2021    Rotator cuff tear 1/26/2015    Rotator cuff tear, right    S/P lumbar fusion 4/5/2018    S/P [employer]ical AV replacement 1992    On Warfarin, desired INR 2.5 to 3.5    Sciatica 2002    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    Spinal stenosis of lumbar region 4/5/2018    Strabismus (Duane Syndrome)     Right eye does not move laterally (Duane Syndrome)    Suicide  attempt by multiple drug overdose, initial encounter (H) 11/27/2020    Tourette syndrome 10/30/2012    Unspecified hypothyroidism        Past Surgical History   I have reviewed this patient's surgical history and updated it with pertinent information if needed.  Past Surgical History:   Procedure Laterality Date    AORTIC VALVE REPLACEMENT  1992    [employer] F's valve    APPLY WOUND VAC Left 1/26/2021    Procedure: Placement of negative pressure wound therapy 2,400 squared centimeters  ;  Surgeon: Lazaro Plascencia MD;  Location: [facility] OR    CATARACT IOL, RT/LT      rt eye only    CHOLECYSTECTOMY, LAPOROSCOPIC  8/10    CLOSE SECONDARY WOUND LOWER EXTREMITY Left 2/3/2021    Procedure:  Split Thickness Skin Graft to Leg of 18 square centimeters  Intermediate Closure of Leg of 8cm   ;  Surgeon: Lazaro Plascencia MD;  Location: [facility] OR    COLONOSCOPY N/A 1/15/2015    Procedure: COLONOSCOPY;  Surgeon: Johnson Kothari MD;  Location:  GI    DECOMPRESSION LUMBAR ONE LEVEL  4/3/2013    Procedure: DECOMPRESSION LUMBAR ONE LEVEL;  Open Decompression L3-4 bilateral;  Surgeon: Travis Coffey MD;  Location: [facility] OR    DECOMPRESSION, FUSION CERVICAL ANTERIOR ONE LEVEL, COMBINED  3/23/2012    Procedure:COMBINED DECOMPRESSION, FUSION CERVICAL ANTERIOR ONE LEVEL; Anterior Cervical Decompression and Fusion C4-6; Surgeon:TRAVIS COFFEY; Location:[facility] OR    ELBOW SURGERY      EXPLORE SPINE, REMOVE HARDWARE, COMBINED  5/23/2013    Procedure: COMBINED EXPLORE SPINE, REMOVE HARDWARE;  Exploration Lumbar Wound for fluid collection;  Surgeon: Travis Coffey MD;  Location: [facility] OR    FUSION CERVICAL ANTERIOR TWO LEVELS  3/26/2012    Procedure:FUSION CERVICAL ANTERIOR TWO LEVELS; Anterior Cervical Fusion C4-6, Anterior Cervical  Hematoma Evacuation; Surgeon:TRAVIS COFFEY; Location:[facility] OR    IR LUMBAR EPIDURAL STEROID INJECTION  3/28/2003    IRRIGATION AND DEBRIDEMENT LOWER EXTREMITY, COMBINED Left 1/10/2021    Procedure: 1.   Irrigation and excisional debridement to muscle left distal medial calf chronic wounds total area measuring 9 cm x 4 cm 2.  Irrigation and excisional debridement to fascia left medial calf chronic hematoma measuring 29 x 6.7 x 4.2 cm 3.  Primary complex wound closure left medial distal calf 9 cm in length;  Surgeon: Rod Kemp MD;  Location: [facility] OR    IRRIGATION AND DEBRIDEMENT LOWER EXTREMITY, COMBINED Left 1/26/2021    Procedure: Evacuation of hematoma debridement of skin, subcutaneous tissue and muscle 2,400 squared centimeters, placement of negative pressure wound therapy 2,400 squared centimeters;  Surgeon: Lazaro Plascencia MD;  Location: [facility] OR    IRRIGATION AND DEBRIDEMENT SPINE, CLOSE WOUND, COMBINED  3/26/2012    Procedure:COMBINED IRRIGATION AND DEBRIDEMENT SPINE, CLOSE WOUND; Surgeon:TRAVIS COFFEY; Location:[facility] OR    OTHER SURGICAL HISTORY      OH UNLISTED ORBIT    OTHER SURGICAL HISTORY      OH UNLISTED SPINE    OTHER SURGICAL HISTORY      OH UNLISTED NECK/THORAX    OTHER SURGICAL HISTORY      (IA) OH TOTAL HIP ARTHROPLASTY    OTHER SURGICAL HISTORY      (IA) OH NEE SCOPE MED W LAT MENISCECT WWO DEBRIBE/SHAVE ANY CO    removal of cyst of back   2.5week    TONSILLECTOMY      wisdom teeth[      [facility] NONSPECIFIC PROCEDURE  1992    repair of TAA with graft    [facility] TOTAL HIP ARTHROPLASTY  2010    Left AMANDA    [facility] TOTAL HIP ARTHROPLASTY  2002    R hip replacement comp's by nerve injury with pain down into R leg, nadya below knee       Prior to Admission Medications   Prior to Admission Medications   Prescriptions Last Dose Informant Patient Reported? Taking?   Azelastine HCl 137 MCG/SPRAY SOLN   No No   Sig: Spray 1 spray into both nostrils 2 times daily   acetaminophen (TYLENOL) 500 MG tablet  Care Giver Yes No   Sig: Take 500-1,000 mg by mouth 2 times daily    cyclobenzaprine (FLEXERIL) 10 MG tablet   No No   Sig: TAKE ONE TABLET BY MOUTH THREE TIMES DAILY AS NEEDED FOR MUSCLE SPASM.   diazepam (VALIUM)  "5 MG tablet   No No   Sig: Take 1 tablet (5 mg) by mouth nightly as needed for anxiety   donepezil (ARICEPT) 10 MG tablet   No No   Sig: Take 1 tablet (10 mg) by mouth At Bedtime   folic acid (FOLVITE) 1 MG tablet   No No   Sig: Take 5 tablets  by mouth daily   guanFACINE (TENEX) 1 MG tablet   No No   Sig: Take 1 tablet (1 mg) by mouth At Bedtime   levothyroxine (SYNTHROID/LEVOTHROID) 175 MCG tablet   No No   Sig: Take 1 tablet (175 mcg) by mouth daily   lisinopril (ZESTRIL) 10 MG tablet   No No   Sig: Take 1 tablet (10 mg) by mouth daily   pregabalin (LYRICA) 100 MG capsule   No No   Sig: Take 1 capsule (100 mg) by mouth 3 times daily Do not take if sleepy/sedated.   propafenone (RYTHMOL) 150 MG TABS tablet   No No   Sig: Take 1 tablet (150 mg) by mouth every 8 hours   simvastatin (ZOCOR) 40 MG tablet   No No   Sig: Take 1 tablet (40 mg) by mouth At Bedtime Profile Rx: patient will contact pharmacy when needed   venlafaxine (EFFEXOR XR) 75 MG 24 hr capsule   No No   Sig: Take 3 capsules (225 mg) by mouth daily   warfarin ANTICOAGULANT (COUMADIN) 2.5 MG tablet  Care Giver Yes No   Sig: Take 5 mg by mouth daily 7.5mg=Wed and Sun, 5mg=ROW   warfarin ANTICOAGULANT (COUMADIN) 5 MG tablet   No No   Sig: Take one tablet  by mouth daily or as directed by INR Clinic.      Facility-Administered Medications Last Administration Doses Remaining   dexamethasone (DECADRON) injection 2 mL 5/11/2022 11:32 AM         Allergies   Allergies   Allergen Reactions    Blood Transfusion Related (Informational Only) Other (See Comments)     Patient has a history of a clinically significant antibody against RBC antigens.  A delay in compatible RBCs may occur.      Ciprofloxacin      \"Went nuts\"    Gabapentin      Severe behavioral disturbances       Social History   I have reviewed this patient's social history and updated it with pertinent information if needed. Nicko SHINE Huiru  reports that he has never smoked. He has never used smokeless " tobacco. He reports that he does not drink alcohol and does not use drugs.    Family History   I have reviewed this patient's family history and updated it with pertinent information if needed.   Family History   Problem Relation Age of Onset    Cerebrovascular Disease Father          age 86, had M.I. ,diabetic also    Prostate Cancer Father     Diabetes Father     Cancer Mother          age 83 of dementia, also h/o lymphoma    Diabetes Mother     Hypertension Brother         2 brothers with hypertension & sleep apnea    Hypertension Sister         Born ~1936    Sleep Apnea Brother          age 78, Alzheimers    No Known Problems Son     No Known Problems Daughter     No Known Problems Son     Family History Negative Brother         Had 5 brothers, three still alive as of 2019    Colon Cancer No family hx of        Review of Systems   The 10 point Review of Systems is negative other than noted in the HPI or here.     Physical Exam   Temp: 98.6  F (37  C) Temp src: Temporal BP: 107/69 Pulse: 64   Resp: 20 SpO2: 92 %      Vital Signs with Ranges  Temp:  [97.5  F (36.4  C)-98.6  F (37  C)] 98.6  F (37  C)  Pulse:  [64] 64  Resp:  [20] 20  BP: (107-149)/(69-93) 107/69  SpO2:  [92 %-100 %] 92 %  0 lbs 0 oz    Constitutional: NAD, Nontoxic.  Pleasant and calm.  HEENT: Normocephalic, MMM, no elevation of JVD noted  Respiratory: Nl WOB, Clear bilaterally, No wheezes, no crackles  Cardiovascular: Regular, systolic click noted  GI: Obese, BS+, NT, ND, no rebound or guarding  Lymph/Hematologic: No bruising. No cervical LAD  Skin: No rash  Musculoskeletal: Nl Tone, No edema noted  Neurologic: A&Ox3, chronic lazy eye noted.  Cranial nerves II through XII intact.  Patient with 5 out of 5 strength in upper and lower extremities bilaterally.  Sensation to gross touch intact.  No clear pronator drift.  Psychiatric: Calm    Data   Data reviewed today:  I personally reviewed   Recent Labs   Lab 10/31/23  9150  10/27/23  1055   WBC 6.8  --    HGB 16.2  --    MCV 95  --      --    INR 2.70* 2.7*     --    POTASSIUM 4.5  --    CHLORIDE 102  --    CO2 26  --    BUN 16.9  --    CR 0.95  --    ANIONGAP 7  --    CATERINA 9.0  --    GLC 99  --        Recent Results (from the past 24 hour(s))   MR Brain w/o & w Contrast    Narrative    EXAM: MR BRAIN W/O and W CONTRAST  LOCATION: [facility]  DATE: 10/31/2023    INDICATION: Acute loss of hearing, with functioning hearing aid  COMPARISON:  MRI brain 06/19/2021.  CONTRAST: 14 mL Gadavist  TECHNIQUE: Routine multiplanar multisequence head MRI without and with intravenous contrast.    FINDINGS:  INTRACRANIAL CONTENTS:  Punctate late acute versus early subacute right posterior temporal infarct (series 3, image 56).. No mass, acute hemorrhage, or extra-axial fluid collections. Patchy nonspecific T2/FLAIR hyperintensities within the cerebral white   matter most consistent with mild to moderate chronic microvascular ischemic change. Unchanged chronic right temporooccipital, left temporoparietal, and bilateral cerebellar infarcts. Normal ventricles and sulci. Normal position of the cerebellar tonsils.   No pathologic contrast enhancement.    SELLA: No abnormality accounting for technique.    OSSEOUS STRUCTURES/SOFT TISSUES: Normal marrow signal. The major intracranial vascular flow voids are maintained. Incidental left cerebellar DVA.    ORBITS: Prior right cataract surgery. Visualized portions of the orbits are otherwise unremarkable.     SINUSES/MASTOIDS: No paranasal sinus mucosal disease. No middle ear or mastoid effusion.       Impression    IMPRESSION:  1.  Punctate late acute versus early subacute right posterior temporal infarct.  2.  No mass effect or hemorrhage.  3.  Chronic multifocal infarcts are not significantly changed since 06/19/2021.    ALISSON Antunez was contacted by me on 10/31/2023 7:09 PM CDT.   CTA Head Neck with Contrast    Narrative     EXAM: CTA HEAD NECK W CONTRAST  LOCATION: [facility]  DATE: 10/31/2023    INDICATION: Right temporal infarct with acute hearling loss.  COMPARISON: MRI brain 10/31/2023. CTA [place] and neck 06/18/2021.  CONTRAST: 67 mL Isovue-370.  TECHNIQUE: Head and neck CT angiogram with IV contrast. Axial helical CT images of the head and neck vessels obtained during the arterial phase of intravenous contrast administration. Axial 2D reconstructed images and multiplanar 3D MIP reconstructed   images of the head and neck vessels were performed by the technologist. Dose reduction techniques were used. All stenosis measurements made according to NASCET criteria unless otherwise specified.    FINDINGS:     HEAD CTA:  ANTERIOR CIRCULATION: Normal enhancement is seen within the intracranial internal carotid arteries, anterior cerebral arteries, and middle cerebral arteries. No vascular cutoff, aneurysm, or flow-limiting stenosis.    POSTERIOR CIRCULATION: Left vertebral artery is dominant. There is mild calcified atherosclerotic plaque within the proximal internal right vertebral artery with mild associated luminal narrowing. There is short segment severe narrowing of the distal   left posterior cerebral artery best visualized on the axial MIP images, image 28 of series 8. This is new compared to the 2021 prior. Intracranial vertebral arteries, basilar artery and posterior cerebral arteries are otherwise widely patent. Left   posterior communicating artery is patent. Right posterior communicating artery is not identified.    DURAL VENOUS SINUSES: Dural venous sinuses are not well evaluated on the basis of this exam.    NECK CTA:  RIGHT CAROTID: No measurable stenosis or dissection. Mild atherosclerotic plaque right carotid bulb/bifurcation.    LEFT CAROTID: No measurable stenosis or dissection. Mild atherosclerotic plaque left carotid bulb/bifurcation.    VERTEBRAL ARTERIES: No focal stenosis or  dissection. Left vertebral artery is larger than the right.    AORTIC ARCH: Left common carotid artery arises from the takeoff of the right brachiocephalic artery. Scattered coarse atherosclerotic plaque is seen within the aortic arch.    NONVASCULAR STRUCTURES: Visualized lung apices are clear. Osseous structures are without suspicious lytic or blastic foci. Prior discectomy and anterior fusion C4 through C6. Hardware is intact without evidence of failure or loosening. Solid ankylosis   across the interspaces at these levels. Transitional segment disease with advanced disc degenerative changes C3-C4.      Impression    IMPRESSION:   HEAD CTA:   1.  Short segment severe stenosis within the distal posterior cerebral artery on the left, new compared to the 06/18/2021 prior.    NECK CTA:  1.  Mild atherosclerotic plaque both carotid bifurcations/bulbs without significant stenosis either cervical carotid or vertebral system by modified NASCET criteria.       Clinically Significant Risk Factors Present on Admission               # Drug Induced Coagulation Defect: home medication list includes an anticoagulant medication    # Hypertension: Noted on problem list

## 2023-11-01 NOTE — PHARMACY-ANTICOAGULATION SERVICE
Clinical Pharmacy - Warfarin Dosing Consult     Pharmacy has been consulted to manage this patient s warfarin therapy.  Indication: Atrial Fibrillation;Other - specify in comments (Mechanical AV Replacement)  Therapy Goal: INR 2.5-3.5  Warfarin Prior to Admission: Yes  Warfarin PTA Regimen: 7.5 mg Tuesday and Friday, 5 mg ROW    INR   Date Value Ref Range Status   10/31/2023 2.70 (H) 0.85 - 1.15 Final   10/27/2023 2.7 (H) 0.9 - 1.1 Final     Chromogenic Factor 10   Date Value Ref Range Status   06/03/2021 17 (L) 70 - 130 % Final     Comment:     Therapeutic Range:  A Chromogenic Factor 10 level of approximately 20-40%   inversely correlates with an INR of 2-3 for patients receiving Warfarin.   Chromogenic Factor 10 levels below 20% indicate an INR greater than 3 and   levels above 40% indicate an INR less than 2.         Recommend warfarin 7.5 mg today.  Pharmacy will monitor Todd S Aschoff daily and order warfarin doses to achieve specified goal.      Please contact pharmacy as soon as possible if the warfarin needs to be held for a procedure or if the warfarin goals change.

## 2023-11-01 NOTE — PROGRESS NOTES
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  In the emergency department, the patient was found have a temperature of 98.6  F, blood pressure 107/69, heart rate 64, respiratory rate 20, SPO2 92% on room air.  Initial lab work showed INR 2.7, remaining CBC and BMP within normal limits.  CTA head and neck showed short segment severe stenosis within the distal posterior cerebral artery on the left, mild atherosclerotic plaque both carotid bifurcations without significant stenosis.  MRI brain showed punctate late acute versus early subacute right posterior temporal infarct, chronic multifocal infarcts that have not significantly changed since 2021.  Stroke neurology was consulted to see the patient.  Echocardiogram returned showing EF 55 to 60% without evidence of LV thrombus though noted to be a suboptimal study.  The patient's simvastatin was exchanged for atorvastatin.  PT/OT were consulted to see the patient.    TODAY'S PLAN:  Pt seen and examined in the ED.  Still c/o hearing difficulty in his L ear and tinnitus.  Was having balance issues (described as a drunken ) on Saturday, but those seem to have improved.  Has been compliant with his warfarin and denies any subtherapeutic INRs.  Appreciate Stroke Neurology recommendations.  Echo showed no acute abn.  Plan for PT/OT today and stroke neuro evaluation.  Anticipate discharge home tomorrow.    Problem List:   L Sided Hearing Loss  Acute/Subacute  Right Posterior Temporal Lobe Infarct  L Posterior Cerebral Artery Severe Stenosis  - Appreciate Stroke Neurology recommendations  - Echo showed EF 55-60%, no LV thrombus, mechanical aortic valve, suboptimal study  - Continue atorvastatin  - PT/OT  - Stroke education  - Continue warfarin  - Permissive hypertension for 24 hours    Paroxysmal Atrial Fibrillation on Warfarin  Thoracic Aortic Dissection s/p Repair with Mechanical Aortic Valve  Warfarin Coagulopathy  - Appreciate Pharmacy dosing warfarin  - No obvious subtherapeutic INR and pt reports compliance with his warfarin  - Goal INR 2.5-3.5    Hypertension  Hyperlipidemia  - Holding lisinopril for permissive hypertension for 24 hours    Chronic Medical Problems:  Hypothyroidism  Chronic Back Pain with Neuropathy  Anxiety/Depression  Mild Cognitive Impairment  BPH  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  In addition time was spent interviewing the patient, communicating with family, and medical decision making.     DVT Prophylaxis: Pneumatic Compression Devices  Code Status: Full Code  Diet: Combination Diet Low Saturated Fat Na <2400mg Diet    Zimmer Catheter: Not present  Disposition: Expected discharge tomorrow to home. Goals prior to discharge include stoke work up complete.   Family updated today: No     Interval History   Pt seen and examined.  Pt reports some ongoing hearing loss in his R ear and tinnitus.  Denies any dizziness, cp, sob.    -Data reviewed today: I personally reviewed all new labs and imaging results over the last 24 hours.     Physical Exam   Temp: 98.6  F (37  C) Temp src: Oral BP: 106/70 Pulse: 61   Resp: 18 SpO2: 96 % O2 Device: None (Room air)    There were no vitals filed for this visit.  Vital Signs with Ranges  Temp:  [97.5  F (36.4  C)-98.6  F (37  C)] 98.6  F (37  C)  Pulse:  [58-65]  "61  Resp:  [16-20] 18  BP: (105-149)/(69-93) 106/70  SpO2:  [92 %-100 %] 96 %  No intake/output data recorded.    GENERAL: No apparent distress. Awake, alert, and fully oriented.  HEENT: Normocephalic, atraumatic. Extraocular movements intact.  CARDIOVASCULAR: Regular rate and rhythm without murmurs or rubs. No S3.  PULMONARY: Clear bilaterally.  GASTROINTESTINAL: Soft, non-tender, non-distended. Bowel sounds normoactive.   EXTREMITIES: No cyanosis or clubbing. No edema.  NEUROLOGICAL: CN 2-12 grossly intact, no focal neurological deficits.  DERMATOLOGICAL: No rash, ulcer, bruising, nor jaundice.    Medications    - MEDICATION INSTRUCTIONS -      - MEDICATION INSTRUCTIONS -        acetaminophen  1,000 mg Oral BID    atorvastatin  40 mg Oral QPM    dexAMETHasone  2 mL      donepezil  10 mg Oral At Bedtime    levothyroxine  175 mcg Oral Daily    pregabalin  100 mg Oral TID    propafenone  150 mg Oral Q8H    sodium chloride (PF)  3 mL Intracatheter Q8H    venlafaxine  225 mg Oral Daily    Warfarin Therapy Reminder  1 each Oral See Admin Instructions     Data     Laboratory:  Recent Labs   Lab 11/01/23  0809 10/31/23  1749   WBC 5.5 6.8   HGB 15.4 16.2   HCT 45.4 46.9   MCV 98 95    200     Recent Labs   Lab 11/01/23  0809 11/01/23  0209 10/31/23  2339 10/31/23  1749     --   --  135   POTASSIUM 4.4  --   --  4.5   CHLORIDE 103  --   --  102   CO2 29  --   --  26   ANIONGAP 5*  --   --  7   GLC 95 84 122* 99   BUN 16.8  --   --  16.9   CR 0.95  --   --  0.95   GFRESTIMATED 88  --   --  88   CATERINA 8.8  --   --  9.0     Recent Labs   Lab 11/01/23  0809 10/31/23  1749 10/27/23  1055   INR 3.02* 2.70* 2.7*     Recent Labs   Lab 10/31/23  1749   CHOL 160   HDL 42   LDL 93   TRIG 123     No results for input(s): \"TROPONIN\", \"TROPI\", \"TROPR\", \"TROPONINIS\" in the last 168 hours.    Invalid input(s): \"TROPT\", \"TROP\", \"TROPONINIES\", \"TNIH\"  No results for input(s): \"CULT\" in the last 168 hours.    Imaging:  Recent " Results (from the past 24 hour(s))   MR Brain w/o & w Contrast    Narrative    EXAM: MR BRAIN W/O and W CONTRAST  LOCATION: [facility]  DATE: 10/31/2023    INDICATION: Acute loss of hearing, with functioning hearing aid  COMPARISON:  MRI brain 06/19/2021.  CONTRAST: 14 mL Gadavist  TECHNIQUE: Routine multiplanar multisequence head MRI without and with intravenous contrast.    FINDINGS:  INTRACRANIAL CONTENTS:  Punctate late acute versus early subacute right posterior temporal infarct (series 3, image 56).. No mass, acute hemorrhage, or extra-axial fluid collections. Patchy nonspecific T2/FLAIR hyperintensities within the cerebral white   matter most consistent with mild to moderate chronic microvascular ischemic change. Unchanged chronic right temporooccipital, left temporoparietal, and bilateral cerebellar infarcts. Normal ventricles and sulci. Normal position of the cerebellar tonsils.   No pathologic contrast enhancement.    SELLA: No abnormality accounting for technique.    OSSEOUS STRUCTURES/SOFT TISSUES: Normal marrow signal. The major intracranial vascular flow voids are maintained. Incidental left cerebellar DVA.    ORBITS: Prior right cataract surgery. Visualized portions of the orbits are otherwise unremarkable.     SINUSES/MASTOIDS: No paranasal sinus mucosal disease. No middle ear or mastoid effusion.       Impression    IMPRESSION:  1.  Punctate late acute versus early subacute right posterior temporal infarct.  2.  No mass effect or hemorrhage.  3.  Chronic multifocal infarcts are not significantly changed since 06/19/2021.    ALISSON Antunez was contacted by me on 10/31/2023 7:09 PM CDT.   CTA Head Neck with Contrast    Narrative    EXAM: CTA HEAD NECK W CONTRAST  LOCATION: [facility]  DATE: 10/31/2023    INDICATION: Right temporal infarct with acute hearling loss.  COMPARISON: MRI brain 10/31/2023. CTA [place] of [place] and neck 06/18/2021.  CONTRAST: 67 mL  Isovue-370.  TECHNIQUE: Head and neck CT angiogram with IV contrast. Axial helical CT images of the head and neck vessels obtained during the arterial phase of intravenous contrast administration. Axial 2D reconstructed images and multiplanar 3D MIP reconstructed   images of the head and neck vessels were performed by the technologist. Dose reduction techniques were used. All stenosis measurements made according to NASCET criteria unless otherwise specified.    FINDINGS:     HEAD CTA:  ANTERIOR CIRCULATION: Normal enhancement is seen within the intracranial internal carotid arteries, anterior cerebral arteries, and middle cerebral arteries. No vascular cutoff, aneurysm, or flow-limiting stenosis.    POSTERIOR CIRCULATION: Left vertebral artery is dominant. There is mild calcified atherosclerotic plaque within the proximal internal right vertebral artery with mild associated luminal narrowing. There is short segment severe narrowing of the distal   left posterior cerebral artery best visualized on the axial MIP images, image 28 of series 8. This is new compared to the 2021 prior. Intracranial vertebral arteries, basilar artery and posterior cerebral arteries are otherwise widely patent. Left   posterior communicating artery is patent. Right posterior communicating artery is not identified.    DURAL VENOUS SINUSES: Dural venous sinuses are not well evaluated on the basis of this exam.    NECK CTA:  RIGHT CAROTID: No measurable stenosis or dissection. Mild atherosclerotic plaque right carotid bulb/bifurcation.    LEFT CAROTID: No measurable stenosis or dissection. Mild atherosclerotic plaque left carotid bulb/bifurcation.    VERTEBRAL ARTERIES: No focal stenosis or dissection. Left vertebral artery is larger than the right.    AORTIC ARCH: Left common carotid artery arises from the takeoff of the right brachiocephalic artery. Scattered coarse atherosclerotic plaque is seen within the aortic arch.    NONVASCULAR  STRUCTURES: Visualized lung apices are clear. Osseous structures are without suspicious lytic or blastic foci. Prior discectomy and anterior fusion C4 through C6. Hardware is intact without evidence of failure or loosening. Solid ankylosis   across the interspaces at these levels. Transitional segment disease with advanced disc degenerative changes C3-C4.      Impression    IMPRESSION:   HEAD CTA:   1.  Short segment severe stenosis within the distal posterior cerebral artery on the left, new compared to the 2021 prior.    NECK CTA:  1.  Mild atherosclerotic plaque both carotid bifurcations/bulbs without significant stenosis either cervical carotid or vertebral system by modified NASCET criteria.   Echocardiogram Limited   Result Value    LVEF  55-60%    Narrative    [id number]  [id number]  [id number]  [id number]^AUNG^CAROLINA     [facility]  Echocardiography Laboratory  [address] [place] [address]     Name: ASCHOFF, TODD S  MRN: [id number]  : 1955  Study Date: 2023 07:31 AM  Age: 67 yrs  Gender: Male  Patient Location: [facility]  Reason For Study: Cerebrovascular Incident  Ordering Physician: CAROLINA HORAN  Referring Physician: Herve Morgan MD  Performed By: Aarti Kaba RDCS     BSA: 2.6 m2  Height: 71 in  Weight: 314 lb  HR: 60  BP: 116/84 mmHg  ______________________________________________________________________________  Procedure  Limited Portable Echo Adult. Optison (NDC #2501-6832) given intravenously.  ______________________________________________________________________________  Interpretation Summary     1. Technically difficult, suboptimal study.  2. The left ventricle is normal in size. Left ventricular systolic function is  normal. The visual ejection fraction is 55-60%. Septal motion is consistent  with post-operative state. There is no thrombus seen in the left ventricle.  3. Mild biatrial enlargement.  4. There is a mechanical aortic valve. (The patient is  status post thoracic  aortic dissection repair and mechanical aortic valve replacement in 1992). The  peak AoV pressure gradient is 13.2 mmHg. These values are within normal limits  for this type of prosthesis. There is trace aortic regurgitation.  5. No pericardial effusion.  6. In direct comparison to the previous study dated 09/08/2023, there has been  a mild interval improvement in left ventricular systolic function.  ______________________________________________________________________________  Left Ventricle  The left ventricle is normal in size. Left ventricular systolic function is  normal. The visual ejection fraction is 55-60%. Septal motion is consistent  with post-operative state. There is no thrombus seen in the left ventricle.     Right Ventricle  The right ventricle is normal size. The right ventricular systolic function is  normal.     Atria  There is mild biatrial enlargement.     Mitral Valve  There is mild mitral annular calcification. There is trace mitral  regurgitation.     Tricuspid Valve  There is trace tricuspid regurgitation. The right ventricular systolic  pressure is approximated at 21.9 mmHg plus the right atrial pressure.     Aortic Valve  There is a mechanical aortic valve. (The patient is status post thoracic  aortic dissection repair and mechanical aortic valve replacement in 1992). The  peak AoV pressure gradient is 13.2 mmHg. These values are within normal limits  for this type of prosthesis. There is trace aortic regurgitation.     Pulmonic Valve  There is trace pulmonic valvular regurgitation. There is no pulmonic valvular  stenosis.     Vessels  Inferior vena cava not well visualized for estimation of right atrial  pressure.     Pericardium  There is no pericardial effusion.     Rhythm  Sinus rhythm was noted.  ______________________________________________________________________________  MMode/2D Measurements & Calculations  IVC diam: 2.5 cm     LA dimension: 6.0 cm  LVOT  diam: 2.1 cm  LVOT area: 3.5 cm2  LA Volume (BP): 101.0 ml  LA Volume Index (BP): 39.6 ml/m2  RV Base: 4.4 cm     Doppler Measurements & Calculations  Ao V2 max: 181.9 cm/sec  Ao max P.2 mmHg  PA acc time: 0.13 sec  TR max jeremiah: 234.1 cm/sec  TR max P.9 mmHg     ______________________________________________________________________________  Report approved by: Nu Crespo 2023 10:02 AM               Tin Etienne DO  [facility] Hospitalist  [address] [place] [address]  Securely message with [employer] (more info)  Text page via [employer] Paging/Directory   2023

## 2023-11-01 NOTE — PLAN OF CARE
Goal Outcome Evaluation:       Pt is A & O x 4, independent, up SBA, plan is echo today, TELE SR 80s, PT/OT/SLP , 4 hr neuros, K replacement, cardiac diet, denies pain, continue POC.

## 2023-11-01 NOTE — CONSULTS
"[facility]    Stroke Consult Note    Reason for Consult:  Stroke    Chief Complaint: Hearing Problem       HPI  Todd S Aschoff is a 67 year old male with pertinent past medical history of aortic dissection repair and mechanical aortic valve replacement in 1992 and paroxysmal atrial fibrillation on warfarin (INR 2.7 on presentation), chronic back pain/neuropathy, HTN, HLD. On PTA Simvastatin 40 mg daily.     He presented to the [facility] ED today with L sided hearing loss. MRI showed a late acute versus early subacute R posterior temporal infarct. CTA with short segment severe stenosis within distal L PCA (new compared to 6/18/2021) and mild b/l atherosclerotic plaque to carotid bifurcations/bulbs without significant stenosis. Recommended to continue anticoagulation and admit for stroke work-up.    He was seen today via telemedicine video exam for inpatient consult.  He reports that his symptoms were initially noticed on Saturday.  He normally wears bilateral hearing aids but he suddenly lost hearing loss completely to his left ear with associated loud ringing.  He also noticed gait instability.  When he tried walking he \"looked like he was drunk and walking all over the place\".  His gait instability last from 4 PM that day until he went to bed that night.  When he woke up in the morning he felt like he was steady again but had persistent left ear hearing loss.  Hearing loss today during visit seems to be improving finally.  He denies any other deficits including vision or speech changes or noticeable extremity incoordination.  He denies any pain or discomfort to his left ear.    We discussed his suspected incidental stroke as well as atherosclerotic disease and vascular risk factors/goals.  He does not check his blood pressure at home but believes systolic was usually around 140s until he started lisinopril.  Recommend to monitor twice daily until he follows up with his PCP.  He denies any " history of smoking.  Reports snoring and excessive daytime sleepiness.  Recommend follow-up with [facility] to evaluate for sleep apnea.  He denies any fevers, night sweats, unintentional weight loss.  He is compliant with his warfarin and denies any difficulty maintaining his INRs.      Stroke Evaluation Summarized    MRI/Head CT MRI: Late acute versus early subacute R posterior temporal infarct   Intracranial Vasculature CTA head: Short segment severe stenosis within the distal posterior cerebral artery on the left, new compared to the 06/18/2021 prior.    Cervical Vasculature CTA neck: 1.  Mild atherosclerotic plaque both carotid bifurcations/bulbs without significant stenosis either cervical carotid or vertebral system by modified NASCET criteria.      Echocardiogram TTE: Technically difficult, suboptimal study, LV norm, EF %, septal motion consistent with post-operative state, no thrombus seen in LV, Mild biatrial enlargement, mechanical aortic valve. (The patient is status post thoracic  aortic dissection repair and mechanical aortic valve replacement in 1992). The  peak AoV pressure gradient is 13.2 mmHg. These values are within normal limits  for this type of prosthesis, trace AR, mild mitral annular calcification, trace MR/TR, SR   EKG/Telemetry Sinus rhythm with sinus arrhythmia  Left axis deviation  Cannot rule out Anterior infarct, age undetermined   Other Testing CT cap: No occult malignancy demonstrated      LDL  10/31/2023: 93 mg/dL   A1C  9/15/2023: 5.6 %  10/31/2023: 5.7 %   Troponin 10/31/2023: 39 ng/L       Impression  Acute persistent but improving L ear Hearing loss and tinnitus along with transient gait instability that lasted several hours, does have severe L PCA stenosis but stroke is not in this territory, do not suspect TIA given duration of symptoms,suspect peripheral vertigo but had questionable LUE dysmetria on exam today, will refer to ENT and at stroke follow-up could  consider repeat MRI brain with coronal DWI/thin cuts through brain stem if their work-up unrevealing, but it is not likely to change current management    Late acute/early subacute R posterior temporal ischemic infarct, unclear etiology, possibly small vessel disease versus breakthrough cardioembolic stroke from atrial fibrillation, [id number] Score: 4, despite therapeutic anticoagulation with warfarin on presentation (unclear INR level in the past few weeks though), CT cap without evidence of malignancy    Severe distal L PCA stenosis, do not suspect currently symptomatic    Recommendations   Acute Stroke Management:  -Neuro checks and vitals every 4 hours  - Inpatient SBP goal <180   -continue PTA warfarin   -recommend switching PTA Simvastatin 40 mg daily to Lipitor 40 mg daily  -telemetry  -PT/OT/SPT  -Euthermia, euglycemia, eunatremia  -Stroke Education  -Stroke Class per [facility]  -Clinical Trials: Discussed DISCOVERY clinical trial (following stroke patients long-term for cognitive decline), interested, referral sent to research team will be in contact    Secondary stroke prevention:  -follow-up with PCP for titration to goal LDL 40-70, <40 increases risk of Intracranial hemorrhage  -Mediterranean diet can be beneficial for overall decreased cardiovascular risk, please print hand out for patient at discharge   -goal HgbA1c <7% for secondary stroke prevention, follow-up with PCP  -long term outpatient blood pressure goal <130/80, recommend home monitoring twice daily in AM and PM, keep log and bring to PCP follow-up    Discussed with vascular neurology attending, Dr. Killian      Patient Follow-up    -Follow-up with PCP in 1-2 weeks  -Follow-up with neurology team in 6-8 weeks (ordered)  -Follow-up with ENT to evaluate for inner ear pathology (ordered)  -Follow-up with sleep medicine to evaluate for sleep apnea (ordered)    Thank you for this consult. No further stroke evaluation is  "recommended, so we will sign off. Please contact us with any additional questions.    Claudia Hernandez PA-C  Vascular Neurology    To page me or covering stroke neurology team member, click here: [facility]  Choose \"On Call\" tab at top, then select \"NEUROLOGY/ALL SITES\" from middle drop-down box, press Enter, then look for \"stroke\" or \"telestroke\" for your site.  _____________________________________________________    Clinically Significant Risk Factors Present on Admission               # Drug Induced Coagulation Defect: home medication list includes an anticoagulant medication    # Hypertension: Noted on problem list                   Past Medical History    Past Medical History:   Diagnosis Date    Advanced directives, counseling/discussion 1/6/2012    Discussed Advance Directive planning with patient; information given to patient to review.    Alcohol dependence (H)     Alcohol dependence in remission (H) 8/2/2018    Allergy, unspecified not elsewhere classified     seasonal    Anemia, unspecified type 1/22/2021    Aortic valve prosthesis present 2/8/2021    Atrial fibrillation (H)     Benign prostatic hyperplasia 2/8/2021    Bilateral thoracic back pain 11/16/2016    BPH (benign prostatic hypertrophy)     Chronic atrial fibrillation (H) 8/7/2002    Chronic infection     low back wound incision not healing     Chronic low back pain 8/19/2011    Chronic pain     lower back and right leg and left leg    Depressive disorder 6/9/2010    Depression  NOS    Dissection of aorta, thoracic (H) 1992    [facility] aotic valve + arch graft 1992    Elevated prostate specific antigen (PSA) 1/22/2020    Family history of prostate cancer 1/22/2020    Generalized muscle weakness 1/22/2021    Headache(784.0)     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 Provider to review    Leg wound, left 1/9/2021    Long term current use of anticoagulant therapy 8/7/2002    LW Modifier:  Coumadin, since mechanical aortic valve LW Onset:  1992 ; Anticoagulant Rx Long Term Problem list name updated by automated process. Provider to review    Low back pain     Lumbar radiculopathy 4/3/2013    Lumbar surgical wound fluid collection 5/23/2013    Major depression     Memory difficulties 1/16/2015    Mixed hyperlipidemia     Morbid obesity (H) 4/20/2010    LW Modifier:  BMI 41.3 (Apr 2010) ; Obesity Morbid    Numbness and tingling     right leg post surg rightt hip/ also left leg since surg    OA (osteoarthritis)     hips    OAB (overactive bladder) 5/11/2015    Obesity, unspecified     Persons encountering health services in other specified circumstances 4/3/2012    Formatting of this note might be different from the original. EMERGENCY CARE PLAN Presenting Problem Signs and Symptoms Treatment Plan   Questions or conerns during clinic hours    I will call the clinic directly    Questions or conerns outside clinic hours    I will call the 24 hour nurse line at [phone]   Patient needs to schedule an appointment    I will call the 24 hour scheduling team at    Polypharmacy 1/22/2021    Prostate infection     Radiculitis 4/20/2010    LW Modifier:  Right foot, s/p R AMANDA LW Onset:  2002 ; Neuralgia    Recurrent major depressive episodes, in full remission (H24) 10/30/2012    Renal insufficiency 1/22/2021    Rotator cuff tear 1/26/2015    Rotator cuff tear, right    S/P lumbar fusion 4/5/2018    S/P 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 Authorizing Provider   acetaminophen (TYLENOL) 500 MG tablet Take 1,000 mg by mouth 2 times daily   Yes Unknown, Entered By History   azelastine-fluticasone (DYMISTA) 137-50 MCG/ACT nasal spray Spray 1 spray into both nostrils daily   Yes Unknown, Entered By History   cyclobenzaprine (FLEXERIL) 10 MG tablet TAKE ONE TABLET BY MOUTH THREE TIMES DAILY AS NEEDED FOR MUSCLE SPASM. 6/2/23  Yes Herve Morgan MD   diazepam (VALIUM) 5 MG tablet Take 1 tablet (5 mg) by mouth nightly as needed for anxiety 10/19/23  Yes Herve Morgan MD   donepezil (ARICEPT) 10 MG tablet Take 1 tablet (10 mg) by mouth At Bedtime 3/14/23  Yes Herve Morgan MD   folic acid 5 MG CAPS Take 5 mg by mouth daily   Yes Unknown, Entered By History   guanFACINE (TENEX) 1 MG tablet Take 1 tablet (1 mg) by mouth At Bedtime 9/15/23  Yes Herve Morgan MD   levothyroxine (SYNTHROID/LEVOTHROID) 175 MCG tablet Take 1 tablet (175 mcg) by mouth daily 8/15/23  Yes Herve Morgan MD   lisinopril (ZESTRIL) 10 MG tablet Take 1 tablet (10 mg) by mouth daily 9/15/23  Yes Herve Morgan MD   pregabalin (LYRICA) 100 MG capsule Take 1 capsule (100 mg) by mouth 3 times daily Do not take if sleepy/sedated. 9/15/23  Yes Herve Morgan MD   propafenone (RYTHMOL) 150 MG TABS tablet Take 1 tablet (150 mg) by mouth every 8 hours 9/1/23  Yes Raquel Miles,    simvastatin (ZOCOR) 40 MG tablet Take 1 tablet (40 mg) by mouth At Bedtime Profile Rx: patient will contact pharmacy when needed 9/15/23  Yes Herve Morgan MD   venlafaxine (EFFEXOR XR) 75 MG 24 hr capsule Take 3 capsules (225 mg) by mouth daily 3/14/23  Yes Herve Morgan MD   warfarin ANTICOAGULANT (COUMADIN) 5 MG tablet Take by mouth daily 7.5mg Tuesday and Friday and 5mg ROW   Yes Unknown,  "Entered By History       Scheduled Meds   acetaminophen  1,000 mg Oral BID    atorvastatin  40 mg Oral QPM    donepezil  10 mg Oral At Bedtime    levothyroxine  175 mcg Oral Daily    pregabalin  100 mg Oral TID    propafenone  150 mg Oral Q8H    sodium chloride (PF)  3 mL Intracatheter Q8H    venlafaxine  225 mg Oral Daily    warfarin ANTICOAGULANT  5 mg Oral ONCE at 18:00    Warfarin Therapy Reminder  1 each Oral See Admin Instructions       Infusion Meds   - MEDICATION INSTRUCTIONS -      - MEDICATION INSTRUCTIONS -         Allergies   Allergies   Allergen Reactions    Blood Transfusion Related (Informational Only) Other (See Comments)     Patient has a history of a clinically significant antibody against RBC antigens.  A delay in compatible RBCs may occur.      Ciprofloxacin      \"Went nuts\"    Gabapentin      Severe behavioral disturbances          PHYSICAL EXAMINATION   Temp:  [97.6  F (36.4  C)-98.6  F (37  C)] 97.6  F (36.4  C)  Pulse:  [57-65] 57  Resp:  [16-20] 18  BP: (105-149)/(69-93) 136/86  SpO2:  [92 %-100 %] 97 %    General Exam  General:  patient lying in bed without any acute distress    HEENT:  normocephalic/atraumatic  Pulmonary:  no respiratory distress    Neuro Exam  Mental Status:  alert, oriented x 3, follows commands, naming and repetition normal, speech mildly dysarthric in setting of chronic hearing loss  Cranial Nerves:  Duane's syndrome R eye, visual fields intact, facial sensation intact and symmetric, facial movements symmetric, hearing not formally tested but chronic bilateral hearing loss with hearing aids, mild dysarthria in setting of chronic hearing loss, tongue protrusion vision midline   Motor: No drift to bilateral upper or lower extremities, does have some difficulty with movement of his left lower extremity due to chronic weakness from prior surgery, symmetric arm roll  Reflexes:  unable to test (telestroke)  Sensory:  light touch sensation intact and symmetric throughout " upper and lower extremities (assessed by nurse) but does report chronic bilateral foot numbness, no extinction on double simultaneous stimulation (assessed by nurse)  Coordination: LUE appears to possibly have slight dysmetria compared to the right, some difficulty with LLE heel-to-shin testing given chronic weakness, no other dysmetria appreciated   Station/Gait:  unable to test due to telestroke    Stroke Scales    NIHSS  1a. Level of Consciousness 0-->Alert, keenly responsive   1b. LOC Questions 0-->Answers both questions correctly   1c. LOC Commands 0-->Performs both tasks correctly   2.   Best Gaze (S) 1-->Partial gaze palsy, gaze is abnormal in one or both eyes, but forced deviation or total gaze paresis is not present (Duane's syndrome R eye)   3.   Visual 0-->No visual loss   4.   Facial Palsy 0-->Normal symmetrical movements   5a. Motor Arm, Left 0-->No drift, limb holds 90 (or 45) degrees for full 10 secs   5b. Motor Arm, Right 0-->No drift, limb holds 90 (or 45) degrees for full 10 secs   6a. Motor Leg, Left 0-->No drift, leg holds 30 degree position for full 5 secs   6b. Motor Leg, right 0-->No drift, leg holds 30 degree position for full 5 secs   7.   Limb Ataxia (S) 1-->Present in one limb (LUE ?possible worse LLE but chronic LLE injury due to prior back surgery)   8.   Sensory (S) 1-->Mild-to-moderate sensory loss, patient feels pinprick is less sharp or is dull on the affected side, or there is a loss of superficial pain with pinprick, but patient is aware of being touched (chronic b/l foot numbness, prior surgeries)   9.   Best Language 0-->No aphasia, normal   10. Dysarthria (S) 1-->Mild-to-moderate dysarthria, patient slurs at least some words and, at worst, can be understood with some difficulty (chronic hearing deficit)   11. Extinction and Inattention  0-->No abnormality   Total 4 (11/01/23 1402)       Imaging  I personally reviewed all imaging; relevant findings per HPI.    Labs Data  "  CBC  Recent Labs   Lab 11/01/23  0809 10/31/23  1749   WBC 5.5 6.8   RBC 4.64 4.95   HGB 15.4 16.2   HCT 45.4 46.9    200     Basic Metabolic Panel   Recent Labs   Lab 11/01/23  0809 11/01/23  0209 10/31/23  2339 10/31/23  1749     --   --  135   POTASSIUM 4.4  --   --  4.5   CHLORIDE 103  --   --  102   CO2 29  --   --  26   BUN 16.8  --   --  16.9   CR 0.95  --   --  0.95   GLC 95 84 122* 99   CATERINA 8.8  --   --  9.0     Liver Panel  No results for input(s): \"PROTTOTAL\", \"ALBUMIN\", \"BILITOTAL\", \"ALKPHOS\", \"AST\", \"ALT\", \"BILIDIRECT\" in the last 168 hours.  INR    Recent Labs   Lab Test 11/01/23  0809 10/31/23  1749 10/27/23  1055   INR 3.02* 2.70* 2.7*           Stroke Consult Data Data   Telestroke Service Details  (for non-emergent stroke consult with tele)  Video start time 11/01/23   1401   Video end time 11/01/23   1456   Type of service telemedicine diagnostic assessment of acute neurological changes   Reason telemedicine is appropriate patient requires assessment with a specialist for diagnosis and treatment of neurological symptoms   Mode of transmission secure interactive audio and video communication per [employer]   Originating site (patient location)     Distant site (provider location) [facility]       I have personally spent a total of 70 minutes providing care today, time spent in reviewing medical records and reviewing tests, examining the patient and obtaining history, coordination of care, and discussion with the patient and/or family regarding diagnostic results, prognosis, symptom management, risks and benefits of management options, and development of plan of care. Greater than 50% was spent in counseling and coordination of care.     *All or a portion of this note was generated using voice recognition software and may contain transcription errors.   "

## 2023-11-01 NOTE — PLAN OF CARE
"SLP: Orders received per CVA protocol, chart reviewed. MRI revealed \"punctate late acute versus early subacute right posterior temporal infarct.\" Per EMR and discussion with RN: no dysarthria, no aphasia, no facial droop. Pt passed RN dysphagia screen, on and tolerating a regular diet. Approached pt in room, introduced role of SLP re: communication and swallowing, pt declined any issues/concerns and pleasantly declines need for evaluation(s). Educated on signs/sx neuro changes re: communication/swallowing to monitor for, and to seek re-consult if any concerns arise - pt verbalized understanding. Will complete orders.       "

## 2023-11-01 NOTE — ED PROVIDER NOTES
ED ATTENDING PHYSICIAN NOTE:   I evaluated this patient in conjunction with Harmony Yousif PA-C  I have participated in the care of the patient and personally performed key elements of the history, exam, and medical decision making.      HPI:     Todd S Aschoff is a 67 year old male on Warfarin with history of paroxysmal atrial fibrillation, hypertension, and hyperlipidemia who presents with left sided hearing loss since 4 PM yesterday. Patient states experiencing similar symptoms a week ago but was resolved on its own. Symptoms have now returned and have not subsided. Denies numbness, speech difficulty, facial droop, vision changes, or weakness of extremities. Nicko mentions he had his INR checked 4 days ago and was 2.7.     Independent Historian:   None - Patient Only    Review of External Notes: None      EXAM:       HEENT:    Oropharynx is moist, without lesions or trismus.     TMs and EAC clear bilateral  Eyes:    Conjunctiva normal     PERRL     EOMs intact  Neck:     Supple, no meningismus.     CV:     Regular rate and rhythm.      No murmurs, rubs or gallops.       2+ radial pulses bilateral.    PULM:    Clear to auscultation bilateral.       No respiratory distress.      Good air exchange.     No rales or wheezing.     No stridor.  ABD:    Soft, non-tender, non-distended.       No pulsatile masses.       No rebound, guarding or rigidity.  MSK:     No gross deformity to all four extremities.   LYMPH:   No cervical lymphadenopathy.  NEURO:   Alert & O x 3   CN II-XII intact, speech is clear with no aphasia.     Finger to nose within normal limits.  No pronator drift.     Strength is 5/5 in all 4 extremities.  Sensation is intact.     Normal muscular tone, no tremor.  Skin:    Warm, dry and intact.    Psych:    Mood is good and affect is appropriate.      National Institutes of Health Stroke Scale  Exam Interval: Baseline   Score    Level of consciousness: (0)   Alert, keenly responsive    LOC questions: (0)    Answers both questions correctly    LOC commands: (0)   Performs both tasks correctly    Best gaze: (0)   Normal    Visual: (0)   No visual loss    Facial palsy: (0)   Normal symmetrical movements    Motor arm (left): (0)   No drift    Motor arm (right): (0)   No drift    Motor leg (left): (0)   No drift    Motor leg (right): (0)   No drift    Limb ataxia: (0)   Absent    Sensory: (0)   Normal- no sensory loss    Best language: (0)   Normal- no aphasia    Dysarthria: (0)   Normal    Extinction and inattention: (0)   No abnormality        Total Score:  0       Independent Interpretation (X-rays, CTs, rhythm strip):  None    Consultations/Discussion of Management or Tests:  None     Social Determinants of Health affecting care:   None     MEDICAL DECISION MAKING/ASSESSMENT AND PLAN:     67-year-old male presenting with acute onset of hearing loss on the left.  His neurological examination is reassuring.  Laboratory studies are unrevealing.  MRI of the brain revealed a right temporal lobe infarction which is acute or subacute.  Patient clearly not a tPA candidate due to timing of symptoms and low NIH stroke scale.  CTA performed revealing no significant stenosis to account for stroke today.  His INR is therapeutic at 2.7.  Patient will require admission and was transferred to a monitored bed.     DIAGNOSIS:     ICD-10-CM    1. Hearing loss of left ear, unspecified hearing loss type  H91.92       2. Right temporal lobe infarction (H)  I63.89                DISPOSITION:   Admit to medical bed    Scribe Disclosure:  Kemar DUARTE, am serving as a scribe at 8:36 PM on 10/31/2023 to document services personally performed by Simone Willis MD based on my observations and the provider's statements to me.   10/31/2023  [facility] EMERGENCY DEPT     Simone Willis MD  10/31/23 [id number]

## 2023-11-01 NOTE — UTILIZATION REVIEW
"  Admission Status; Secondary Review Determination         Under the authority of the Utilization Management Committee, the utilization review process indicated a secondary review on the above patient.  The review outcome is based on review of the medical records, discussions with staff, and applying clinical experience noted on the date of the review.        (X)      Inpatient Status Appropriate - This patient's medical care is consistent with medical management for inpatient care and reasonable inpatient medical practice.      () Observation Status Appropriate - This patient does not meet hospital inpatient criteria and is placed in observation status. If this patient's primary payer is Medicare and was admitted as an inpatient, Condition Code 44 should be used and patient status changed to \"observation\".   () Admission Status NOT Appropriate - This patient's medical care is not consistent with medical management for Inpatient or Observation Status.          RATIONALE FOR DETERMINATION     67 year old male with a past medical history of thoracic aortic dissection status postrepair, mechanical aortic valve on warfarin, A-fib, hyperlipidemia, hypothyroidism, BPH, chronic back pain who presents for acute onset left ear hearing loss.  MRI of the brain showed punctate late acute versus early subacute right posterior temporal infarct without any mass effect along with chronic multifocal infarcts.  His vessel imaging showed severe stenosis within the distal posterior cerebral artery on the left that was new compared to June 2021 along with mild atherosclerotic plaque within both carotid bifurcations.  He continues to have a hearing deficit and will remain in the hospital for further therapies and stroke neuro evaluation.  I spoke to Dr. Etienne.    The severity of illness, intensity of service provided, expected LOS and risk for adverse outcome make the care complex, high risk and appropriate for hospital admission.      "   The information on this document is developed by the utilization review team in order for the business office to ensure compliance.  This only denotes the appropriateness of proper admission status and does not reflect the quality of care rendered.         The definitions of Inpatient Status and Observation Status used in making the determination above are those provided in the CMS Coverage Manual, Chapter 1 and Chapter 6, section 70.4.      Sincerely,     Herve Barker MD  Physician Advisor  Utilization Review/ Case Management  [facility].

## 2023-11-01 NOTE — PHARMACY-ADMISSION MEDICATION HISTORY
Pharmacist Admission Medication History    Admission medication history is complete. The information provided in this note is only as accurate as the sources available at the time of the update.    Information Source(s): Patient and CareEverywhere/SureScripts via in-person    Pertinent Information: Pt states he last took PTA medications at 1500 before presenting to ED today.    Changes made to PTA medication list:  Added: None  Deleted: None  Changed:   Warfarin sig (last anticoag visit 10/27)    Allergies reviewed with patient and updates made in EHR: no    Medication History Completed By: Marisa Sibley SHANDRA 10/31/2023 10:29 PM    Current Facility-Administered Medications for the 10/31/23 encounter (Hospital Encounter)   Medication    dexamethasone (DECADRON) injection 2 mL     PTA Med List   Medication Sig Last Dose    acetaminophen (TYLENOL) 500 MG tablet Take 1,000 mg by mouth 2 times daily 10/31/2023 at X1    azelastine-fluticasone (DYMISTA) 137-50 MCG/ACT nasal spray Spray 1 spray into both nostrils daily 10/31/2023    cyclobenzaprine (FLEXERIL) 10 MG tablet TAKE ONE TABLET BY MOUTH THREE TIMES DAILY AS NEEDED FOR MUSCLE SPASM. 10/31/2023 at X2    diazepam (VALIUM) 5 MG tablet Take 1 tablet (5 mg) by mouth nightly as needed for anxiety 10/30/2023 at HS    donepezil (ARICEPT) 10 MG tablet Take 1 tablet (10 mg) by mouth At Bedtime 10/30/2023 at HS    folic acid 5 MG CAPS Take 5 mg by mouth daily 10/31/2023    guanFACINE (TENEX) 1 MG tablet Take 1 tablet (1 mg) by mouth At Bedtime 10/30/2023 at HS    levothyroxine (SYNTHROID/LEVOTHROID) 175 MCG tablet Take 1 tablet (175 mcg) by mouth daily 10/31/2023    lisinopril (ZESTRIL) 10 MG tablet Take 1 tablet (10 mg) by mouth daily 10/31/2023 at AM    pregabalin (LYRICA) 100 MG capsule Take 1 capsule (100 mg) by mouth 3 times daily Do not take if sleepy/sedated. 10/31/2023 at X2    propafenone (RYTHMOL) 150 MG TABS tablet Take 1 tablet (150 mg) by mouth every 8 hours  10/31/2023 at X2    simvastatin (ZOCOR) 40 MG tablet Take 1 tablet (40 mg) by mouth At Bedtime Profile Rx: patient will contact pharmacy when needed 10/30/2023 at HS    venlafaxine (EFFEXOR XR) 75 MG 24 hr capsule Take 3 capsules (225 mg) by mouth daily 10/31/2023 at AM    warfarin ANTICOAGULANT (COUMADIN) 5 MG tablet Take by mouth daily 7.5mg Tuesday and Friday and 5mg ROW 10/30/2023 at PM

## 2023-11-01 NOTE — CONSULTS
SW aware of consult placed for discharge planning. SW reviewed chart & noted PT is recommending home with outpatient PT. No SW needs identified at this time. SW can be re-consulted if discharge needs arise.     MALLY Burroughs  [facility]  11/1/2023  1:54 PM

## 2023-11-01 NOTE — ED PROVIDER NOTES
History     Chief Complaint:  Hearing Problem       HPI   Todd S Aschoff is a 67 year old male with history of paroxysmal atrial fibrillation, chronically anticoagulated on warfarin, BPH, hypothyroidism, HTN, CVA x 4, who presents with hearing loss. Patient states that he developed sudden hearing loss to left side yesterday around 4 PM. Patient states similar symptoms happened one week ago but resolved, now they have returned and have not subsided. Denies any numbness or weakness to extremities, speech finding difficulty, facial droop, vision changes. States he had his INR checked 4 days ago and was 2.7.       Independent Historian:   None - Patient Only    Review of External Notes:   Reviewed urgent care note from today - negative work up, referred for MRI       Medications:    acetaminophen (TYLENOL) 500 MG tablet  Azelastine HCl 137 MCG/SPRAY SOLN  cyclobenzaprine (FLEXERIL) 10 MG tablet  diazepam (VALIUM) 5 MG tablet  donepezil (ARICEPT) 10 MG tablet  folic acid (FOLVITE) 1 MG tablet  guanFACINE (TENEX) 1 MG tablet  levothyroxine (SYNTHROID/LEVOTHROID) 175 MCG tablet  lisinopril (ZESTRIL) 10 MG tablet  pregabalin (LYRICA) 100 MG capsule  propafenone (RYTHMOL) 150 MG TABS tablet  simvastatin (ZOCOR) 40 MG tablet  venlafaxine (EFFEXOR XR) 75 MG 24 hr capsule  warfarin ANTICOAGULANT (COUMADIN) 2.5 MG tablet  warfarin ANTICOAGULANT (COUMADIN) 5 MG tablet        Past Medical History:    Past Medical History:   Diagnosis Date    Advanced directives, counseling/discussion 1/6/2012    Alcohol dependence (H)     Alcohol dependence in remission (H) 8/2/2018    Allergy, unspecified not elsewhere classified     Anemia, unspecified type 1/22/2021    Aortic valve prosthesis present 2/8/2021    Atrial fibrillation (H)     Benign prostatic hyperplasia 2/8/2021    Bilateral thoracic back pain 11/16/2016    BPH (benign prostatic hypertrophy)     Chronic atrial fibrillation (H) 8/7/2002    Chronic infection     Chronic low back  pain 8/19/2011    Chronic pain     Depressive disorder 6/9/2010    Dissection of aorta, thoracic (H) 1992    Elevated prostate specific antigen (PSA) 1/22/2020    Family history of prostate cancer 1/22/2020    Generalized muscle weakness 1/22/2021    Headache(784.0)     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    History of spinal cord injury     Hyperlipidemia LDL goal <130 10/31/2010    Hypotension, unspecified hypotension type 1/22/2021    Hypothyroidism 8/7/2002    Leg wound, left 1/9/2021    Long term current use of anticoagulant therapy 8/7/2002    Low back pain     Lumbar radiculopathy 4/3/2013    Lumbar surgical wound fluid collection 5/23/2013    Major depression     Memory difficulties 1/16/2015    Mixed hyperlipidemia     Morbid obesity (H) 4/20/2010    Numbness and tingling     OA (osteoarthritis)     OAB (overactive bladder) 5/11/2015    Obesity, unspecified     Persons encountering health services in other specified circumstances 4/3/2012    Polypharmacy 1/22/2021    Prostate infection     Radiculitis 4/20/2010    Recurrent major depressive episodes, in full remission (H24) 10/30/2012    Renal insufficiency 1/22/2021    Rotator cuff tear 1/26/2015    S/P lumbar fusion 4/5/2018    S/P [employer] AV replacement 1992    Sciatica 2002    Spinal stenosis of lumbar region 4/5/2018    Strabismus (Duane Syndrome)     Suicide attempt by multiple drug overdose, initial encounter (H) 11/27/2020    Tourette syndrome 10/30/2012    Unspecified hypothyroidism        Past Surgical History:    Past Surgical History:   Procedure Laterality Date    AORTIC VALVE REPLACEMENT  1992    [employer] valve    APPLY WOUND VAC Left 1/26/2021    Procedure: Placement of negative pressure wound therapy 2,400 squared centimeters  ;  Surgeon: Lazaro Plascencia MD;  Location: [facility] OR    CATARACT IOL, RT/LT      rt eye only    CHOLECYSTECTOMY, LAPOROSCOPIC  8/10    CLOSE SECONDARY WOUND LOWER EXTREMITY Left 2/3/2021     Procedure:  Split Thickness Skin Graft to Leg of 18 square centimeters  Intermediate Closure of Leg of 8cm   ;  Surgeon: Lazaro Plascencia MD;  Location: [facility] OR    COLONOSCOPY N/A 1/15/2015    Procedure: COLONOSCOPY;  Surgeon: oJhnson Kothari MD;  Location: [facility] GI    DECOMPRESSION LUMBAR ONE LEVEL  4/3/2013    Procedure: DECOMPRESSION LUMBAR ONE LEVEL;  Open Decompression L3-4 bilateral;  Surgeon: Travis Coffey MD;  Location: [facility] OR    DECOMPRESSION, FUSION CERVICAL ANTERIOR ONE LEVEL, COMBINED  3/23/2012    Procedure:COMBINED DECOMPRESSION, FUSION CERVICAL ANTERIOR ONE LEVEL; Anterior Cervical Decompression and Fusion C4-6; Surgeon:TRAVIS COFFEY; Location:[facility] OR    ELBOW SURGERY      EXPLORE SPINE, REMOVE HARDWARE, COMBINED  5/23/2013    Procedure: COMBINED EXPLORE SPINE, REMOVE HARDWARE;  Exploration Lumbar Wound for fluid collection;  Surgeon: Travis Coffey MD;  Location: [facility] OR    FUSION CERVICAL ANTERIOR TWO LEVELS  3/26/2012    Procedure:FUSION CERVICAL ANTERIOR TWO LEVELS; Anterior Cervical Fusion C4-6, Anterior Cervical  Hematoma Evacuation; Surgeon:TRAVIS COFFEY; Location:[facility] OR    IR LUMBAR EPIDURAL STEROID INJECTION  3/28/2003    IRRIGATION AND DEBRIDEMENT LOWER EXTREMITY, COMBINED Left 1/10/2021    Procedure: 1.  Irrigation and excisional debridement to muscle left distal medial calf chronic wounds total area measuring 9 cm x 4 cm 2.  Irrigation and excisional debridement to fascia left medial calf chronic hematoma measuring 29 x 6.7 x 4.2 cm 3.  Primary complex wound closure left medial distal calf 9 cm in length;  Surgeon: Rod Kemp MD;  Location: [facility] OR    IRRIGATION AND DEBRIDEMENT LOWER EXTREMITY, COMBINED Left 1/26/2021    Procedure: Evacuation of hematoma debridement of skin, subcutaneous tissue and muscle 2,400 squared centimeters, placement of negative pressure wound therapy 2,400 squared centimeters;  Surgeon: Lazaro Plascencia MD;  Location: [facility] OR    IRRIGATION AND  DEBRIDEMENT SPINE, CLOSE WOUND, COMBINED  3/26/2012    Procedure:COMBINED IRRIGATION AND DEBRIDEMENT SPINE, CLOSE WOUND; Surgeon:TRAVIS COFFEY; Location:[facility] OR    OTHER SURGICAL HISTORY      OR UNLISTED ORBIT    OTHER SURGICAL HISTORY      OR UNLISTED SPINE    OTHER SURGICAL HISTORY      OR UNLISTED NECK/THORAX    OTHER SURGICAL HISTORY      (IA) OR TOTAL HIP ARTHROPLASTY    OTHER SURGICAL HISTORY      (IA) OR NEE SCOPE MED W LAT MENISCECT WWO DEBRIBE/SHAVE ANY CO    removal of cyst of back   2.5week    TONSILLECTOMY      wisdom teeth[      [facility] NONSPECIFIC PROCEDURE  1992    repair of TAA with graft    [facility] TOTAL HIP ARTHROPLASTY  2010    Left AMANDA    [facility] TOTAL HIP ARTHROPLASTY  2002    R hip replacement comp's by nerve injury with pain down into R leg, nadya below knee        Physical Exam   Patient Vitals for the past 24 hrs:   BP Temp Temp src Pulse Resp SpO2   10/31/23 2115 115/75 -- -- 63 -- 98 %   10/31/23 1645 107/69 -- -- 64 -- 92 %   10/31/23 1514 (!) 149/93 98.6  F (37  C) Temporal 64 20 100 %        Physical Exam  Constitutional: Alert, attentive  HENT:    Nose: Nose normal.    Mouth/Throat: Oropharynx is clear, mucous membranes are moist  Eyes: EOM are normal. Pupils are equal, round, and reactive to light.   CV: Regular rate and rhythm, no murmurs, rubs or gallops.  Chest: Effort normal and breath sounds normal.   GI: No distension. There is no tenderness  MSK: Normal range of motion.   Neurological:   GCS 15; A/Ox3; Hearing loss present to left side.  5/5 strength throughout the upper and lower extremities;   sensation intact to light touch throughout the upper and lower extremities;   2+ DTRs to the bilateral upper and lower extremities (biceps, BRs, patellar, achilles);   normal fine motor coordination intact bilaterally;   normal gait   Skin: Skin is warm and dry.      Emergency Department Course     ECG results from 10/31/23   EKG 12-lead, tracing only     Value    Systolic Blood Pressure      Diastolic Blood Pressure     Ventricular Rate 66    Atrial Rate 66    VT Interval 188    QRS Duration 102        QTc 434    P Axis 66    R AXIS -42    T Axis 73    Interpretation ECG      Sinus rhythm with sinus arrhythmia  Left axis deviation  Cannot rule out Anterior infarct , age undetermined  Abnormal ECG  When compared with ECG of 21-JAN-2022 12:37,  Premature ventricular complexes are no longer Present  QRS duration has increased  Minimal criteria for Anterior infarct are now Present       *Note: Due to a large number of results and/or encounters for the requested time period, some results have not been displayed. A complete set of results can be found in Results Review.         Imaging:  CTA Head Neck with Contrast   Final Result   IMPRESSION:    HEAD CTA:    1.  Short segment severe stenosis within the distal posterior cerebral artery on the left, new compared to the 06/18/2021 prior.      NECK CTA:   1.  Mild atherosclerotic plaque both carotid bifurcations/bulbs without significant stenosis either cervical carotid or vertebral system by modified NASCET criteria.      MR Brain w/o & w Contrast   Final Result   IMPRESSION:   1.  Punctate late acute versus early subacute right posterior temporal infarct.   2.  No mass effect or hemorrhage.   3.  Chronic multifocal infarcts are not significantly changed since 06/19/2021.      ALISSON Antunez was contacted by me on 10/31/2023 7:09 PM CDT.             Laboratory:  Labs Ordered and Resulted from Time of ED Arrival to Time of ED Departure   INR - Abnormal       Result Value    INR 2.70 (*)    BASIC METABOLIC PANEL - Normal    Sodium 135      Potassium 4.5      Chloride 102      Carbon Dioxide (CO2) 26      Anion Gap 7      Urea Nitrogen 16.9      Creatinine 0.95      GFR Estimate 88      Calcium 9.0      Glucose 99     CBC WITH PLATELETS AND DIFFERENTIAL    WBC Count 6.8      RBC Count 4.95      Hemoglobin 16.2      Hematocrit 46.9      MCV 95      MCH 32.7       MCHC 34.5      RDW 12.9      Platelet Count 200      % Neutrophils 62      % Lymphocytes 28      % Monocytes 8      % Eosinophils 2      % Basophils 0      % Immature Granulocytes 0      NRBCs per 100 WBC 0      Absolute Neutrophils 4.1      Absolute Lymphocytes 1.9      Absolute Monocytes 0.6      Absolute Eosinophils 0.2      Absolute Basophils 0.0      Absolute Immature Granulocytes 0.0      Absolute NRBCs 0.0          Procedures   None    Emergency Department Course & Assessments:       Interventions:  Medications   gadobutrol (GADAVIST) injection 14 mL (14 mLs Intravenous $Given 10/31/23 1859)   CT Scan Flush (80 mLs Intravenous $Given 10/31/23 1953)   iopamidol (ISOVUE-370) solution 500 mL (67 mLs Intravenous $Given 10/31/23 1953)        Independent Interpretation (X-rays, CTs, rhythm strip):  None    Consultations/Discussion of Management or Tests:   ED Course as of 10/31/23 2123   Tue Oct 31, 2023   1920 Consulted with [facility] regarding patient   2052 Consulted with stroke neurology, Dr. Tsang, regarding patient   2122 Consulted with Dr. Caruso with inpatient hospitalist service       Social Determinants of Health affecting care:   None    Disposition:  The patient was admitted to the hospital under the care of Dr. Caruso.     Impression & Plan      Medical Decision Making:  Todd S Aschoff is a 67 year old male who presents with left sided hearing loss. He is currently anticoagulated on warfarin for PAF, INR today 2.7. Neuro exam is entirely benign outside of left sided hearing loss. Patient has significant history of CVA's over the past year and given his new onset hearing loss and reported balance issues, concerned for recurrence. MRI obtained with evidence of acute vs subacute right temporal infarct. CTA head and neck completed without right sided findings. Incidentally note left sided severe stenosis to distal posterior cerebral artery. Consulted with stroke neurology, Dr. Tsang, who states stroke  neurology will follow along with admission. Patient will be admitted to the hospital for further management. I spoke to the hospitalist, Dr. Caruso, who has agreed to admit the patient for continued evaluation and treatment.      Diagnosis:    ICD-10-CM    1. Hearing loss of left ear, unspecified hearing loss type  H91.92       2. Right temporal lobe infarction (H)  I63.89            Discharge Medications:  New Prescriptions    No medications on file          10/31/2023   DA Campbell Lauren R, PA-C  10/31/23 2123

## 2023-11-01 NOTE — PROGRESS NOTES
"[facility]    ED Boarding Nurse Handoff Addendum Report:    Date/time: 11/1/2023, 11:04 AM    Activity Level: Ind/SBA    Fall Risk: No    Active Infusions: none    Current Meds Due: See MAR    Current care needs: Telestroke visit, PT  Neuros q4, tele    Oxygen requirements (liters/min and/or FiO2): none    Respiratory status: Room air    Vital signs (within last 30 minutes):    Vitals:    11/01/23 0620 11/01/23 0905 11/01/23 1048 11/01/23 1054   BP: 116/84   120/81   BP Location:    Right arm   Patient Position:       Cuff Size: Adult Regular      Pulse:  62 65 62   Resp: 16   16   Temp: 97.6  F (36.4  C)   98.6  F (37  C)   TempSrc: Oral   Oral   SpO2: 98% 100% 98% 98%       Focused assessment within last 30 minutes:    A&Ox4, Neuros intact. Slight hearing deficit to left ear, notes improvement. PERRLA. 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 Tylenol given for chronic shoulder pain. Tele- SR. Tolerating regular/card diet well. One episode of loose stool, incontinent while trying to get to the bathroom, bedside commode provided. Contact precautions maintained for documented MRSA.    ED Boarding Nurse name: Kaleigh Abraham RN     RECEIVING UNIT ED HANDOFF REVIEW    Above ED Nurse Handoff Report was reviewed: Yes  Reviewed by: Mita Jiménez RN on November 1, 2023 at 11:19 AM    "

## 2023-11-02 ENCOUNTER — APPOINTMENT (OUTPATIENT)
Dept: PHYSICAL THERAPY | Facility: CLINIC | Age: 68
DRG: 064 | End: 2023-11-02
Payer: COMMERCIAL

## 2023-11-02 ENCOUNTER — TELEPHONE (OUTPATIENT)
Dept: ANTICOAGULATION | Facility: CLINIC | Age: 68
End: 2023-11-02
Payer: COMMERCIAL

## 2023-11-02 VITALS
HEART RATE: 71 BPM | OXYGEN SATURATION: 97 % | TEMPERATURE: 97.7 F | RESPIRATION RATE: 18 BRPM | DIASTOLIC BLOOD PRESSURE: 91 MMHG | SYSTOLIC BLOOD PRESSURE: 151 MMHG

## 2023-11-02 LAB
GLUCOSE BLDC GLUCOMTR-MCNC: 86 MG/DL (ref 70–99)
INR PPP: 4.04 (ref 0.85–1.15)
POTASSIUM SERPL-SCNC: 4 MMOL/L (ref 3.4–5.3)

## 2023-11-02 PROCEDURE — 84132 ASSAY OF SERUM POTASSIUM: CPT | Performed by: INTERNAL MEDICINE

## 2023-11-02 PROCEDURE — 36415 COLL VENOUS BLD VENIPUNCTURE: CPT | Performed by: INTERNAL MEDICINE

## 2023-11-02 PROCEDURE — 97530 THERAPEUTIC ACTIVITIES: CPT | Mod: GP

## 2023-11-02 PROCEDURE — 97116 GAIT TRAINING THERAPY: CPT | Mod: GP

## 2023-11-02 PROCEDURE — 250N000013 HC RX MED GY IP 250 OP 250 PS 637: Performed by: HOSPITALIST

## 2023-11-02 PROCEDURE — 99232 SBSQ HOSP IP/OBS MODERATE 35: CPT | Performed by: INTERNAL MEDICINE

## 2023-11-02 PROCEDURE — 250N000013 HC RX MED GY IP 250 OP 250 PS 637: Performed by: INTERNAL MEDICINE

## 2023-11-02 PROCEDURE — 85610 PROTHROMBIN TIME: CPT | Performed by: HOSPITALIST

## 2023-11-02 RX ORDER — ATORVASTATIN CALCIUM 40 MG/1
40 TABLET, FILM COATED ORAL EVERY EVENING
Qty: 30 TABLET | Refills: 0 | Status: SHIPPED | OUTPATIENT
Start: 2023-11-02 | End: 2023-12-06

## 2023-11-02 RX ORDER — LISINOPRIL 10 MG/1
10 TABLET ORAL DAILY
Status: DISCONTINUED | OUTPATIENT
Start: 2023-11-02 | End: 2023-11-02 | Stop reason: HOSPADM

## 2023-11-02 RX ADMIN — ACETAMINOPHEN 1000 MG: 500 TABLET, FILM COATED ORAL at 09:02

## 2023-11-02 RX ADMIN — LISINOPRIL 10 MG: 10 TABLET ORAL at 11:30

## 2023-11-02 RX ADMIN — VENLAFAXINE HYDROCHLORIDE 225 MG: 150 CAPSULE, EXTENDED RELEASE ORAL at 09:01

## 2023-11-02 RX ADMIN — PREGABALIN 100 MG: 25 CAPSULE ORAL at 09:02

## 2023-11-02 RX ADMIN — LEVOTHYROXINE SODIUM 175 MCG: 0.17 TABLET ORAL at 09:02

## 2023-11-02 RX ADMIN — PROPAFENONE HYDROCHLORIDE 150 MG: 150 TABLET, FILM COATED ORAL at 06:40

## 2023-11-02 ASSESSMENT — ACTIVITIES OF DAILY LIVING (ADL)
ADLS_ACUITY_SCORE: 24

## 2023-11-02 NOTE — TELEPHONE ENCOUNTER
"ANTICOAGULATION  MANAGEMENT: Discharge Review    Todd S Aschoff chart reviewed for anticoagulation continuity of care    Hospital Admission on 10/31/23 for sudden hearing loss in left ear, found to have Right temporal lobe infarction (H)  .    Discharge disposition: Home    Results:    Recent labs: (last 7 days)     10/27/23  1055 10/31/23  1749 11/01/23  0809 11/01/23  1601 11/02/23  0757   INR 2.7* 2.70* 3.02* 3.09* 4.04*     Anticoagulation inpatient management:     home regimen continued    Anticoagulation discharge instructions:     Warfarin dosing: home regimen continued From the Pharmacy note 11/2/23 - \"His INR increased from 3.02 to 4.04 today. Given he just had a stroke, MD ok with INR running slightly elevated rather than decreasing the dose and having a subtherapeutic INR. Continue home warfarin regimen of 7.5 mg Tuesday and Friday, 5 mg ROW   The patient should have an INR checked  tomorrow, 11/3/23  given supratherapeutic INR today.    Teresita Garcia, PharmD  November 2, 2023      Bridging: No   INR goal change: No      Medication changes affecting anticoagulation: No,  but did have a change,  Simvastatin changed to atorvastatin. Per [employer]: Concurrent use of SIMVASTATIN and WARFARIN may result in increased risk of bleeding and an increased risk of rhabdomyolysis.    Additional factors affecting anticoagulation: No     PLAN     No adjustment to anticoagulation plan needed  Agree with discharge plan for follow up on 11/3/23    Spoke with Nicko, lab INR appointment scheduled for 10:30 on 11/3/23    No adjustment to Anticoagulation Calendar was required    Lucina Andrew RN  "

## 2023-11-02 NOTE — PLAN OF CARE
Goal Outcome Evaluation:  Patient alert and oriented, denies pain, Vss, patient independent with ambulation, patient potential discharge today, no acute needs seen or reported, neuros appear unchanged will cont to monitor.

## 2023-11-02 NOTE — PROGRESS NOTES
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  In the emergency department, the patient was found have a temperature of 98.6  F, blood pressure 107/69, heart rate 64, respiratory rate 20, SPO2 92% on room air.  Initial lab work showed INR 2.7, remaining CBC and BMP within normal limits.  CTA head and neck showed short segment severe stenosis within the distal posterior cerebral artery on the left, mild atherosclerotic plaque both carotid bifurcations without significant stenosis.  MRI brain showed punctate late acute versus early subacute right posterior temporal infarct, chronic multifocal infarcts that have not significantly changed since 2021.  Stroke neurology was consulted to see the patient.  Echocardiogram returned showing EF 55 to 60% without evidence of LV thrombus though noted to be a suboptimal study.  The patient's simvastatin was exchanged for atorvastatin.  PT/OT were consulted to see the patient.     TODAY'S PLAN:  Appreciate Stroke Neurology recommendations.  Discharge home today.  Pt with improvement in his hearing loss today.      Problem List:   L Sided Hearing Loss  Acute/Subacute Right Posterior Temporal Lobe Infarct  L Posterior Cerebral Artery Severe Stenosis  - Appreciate Stroke Neurology recommendations  - Echo showed EF 55-60%, no LV thrombus, mechanical aortic valve, suboptimal study  - Continue atorvastatin  - PT/OT  - Stroke education  - Continue warfarin  - Resume lisinopril      Paroxysmal Atrial Fibrillation on Warfarin  Thoracic Aortic Dissection s/p Repair with Mechanical Aortic Valve  Warfarin Coagulopathy  - Appreciate Pharmacy dosing warfarin  - No obvious subtherapeutic INR and pt reports compliance with his warfarin  - Goal INR 2.5-3.5     Hypertension  Hyperlipidemia  - Resume lisinopril     Chronic Medical Problems:  Hypothyroidism  Chronic Back Pain with Neuropathy  Anxiety/Depression  Mild Cognitive Impairment  BPH  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  In addition time was spent interviewing the patient, communicating with family, and medical decision making.     DVT Prophylaxis: Pneumatic Compression Devices  Code Status: Full Code  Diet: Combination Diet Low Saturated Fat Na <2400mg Diet    Zimmer Catheter: Not present  Disposition: Expected discharge today to home. Goals prior to discharge include stroke work up complete.   Family updated today: No     Interval History   Pt seen and examined.  Pt reports his hearing is back.    -Data reviewed today: I personally reviewed all new labs and imaging results over the last 24 hours.     Physical Exam   Temp: 97.7  F (36.5  C) Temp src: Oral BP: (!) 151/91 Pulse: 71   Resp: 18 SpO2: 97 % O2 Device: None (Room air)    There were no vitals filed for this visit.  Vital Signs with Ranges  Temp:  [97.3  F (36.3  C)-98.6  F (37  C)] 97.7  F (36.5  C)  Pulse:  [54-71] 71  Resp:  [16-18] 18  BP: ()/(56-91) 151/91  SpO2:  [96 %-100 %] 97 %  No intake/output data recorded.    GENERAL: No apparent distress. Awake, alert, and fully oriented.  HEENT: Normocephalic, atraumatic. Extraocular movements intact.  CARDIOVASCULAR: Regular rate and rhythm without murmurs or rubs. No S3.  PULMONARY: Clear bilaterally.  GASTROINTESTINAL: Soft, non-tender, non-distended. Bowel sounds normoactive.  "  EXTREMITIES: No cyanosis or clubbing. No edema.  NEUROLOGICAL: CN 2-12 grossly intact, no focal neurological deficits.  DERMATOLOGICAL: No rash, ulcer, bruising, nor jaundice.    Medications    - MEDICATION INSTRUCTIONS -      - MEDICATION INSTRUCTIONS -        acetaminophen  1,000 mg Oral BID    atorvastatin  40 mg Oral QPM    donepezil  10 mg Oral At Bedtime    levothyroxine  175 mcg Oral Daily    lisinopril  10 mg Oral Daily    pregabalin  100 mg Oral TID    propafenone  150 mg Oral Q8H    sodium chloride (PF)  3 mL Intracatheter Q8H    venlafaxine  225 mg Oral Daily    Warfarin Therapy Reminder  1 each Oral See Admin Instructions     Data     Laboratory:  Recent Labs   Lab 11/01/23  0809 10/31/23  1749   WBC 5.5 6.8   HGB 15.4 16.2   HCT 45.4 46.9   MCV 98 95    200     Recent Labs   Lab 11/02/23  0757 11/02/23  0408 11/01/23  2126 11/01/23  1859 11/01/23  1518 11/01/23  0809 10/31/23  2339 10/31/23  1749   NA  --   --   --   --   --  137  --  135   POTASSIUM 4.0  --   --   --   --  4.4  --  4.5   CHLORIDE  --   --   --   --   --  103  --  102   CO2  --   --   --   --   --  29  --  26   ANIONGAP  --   --   --   --   --  5*  --  7   GLC  --  86 99 114*   < > 95   < > 99   BUN  --   --   --   --   --  16.8  --  16.9   CR  --   --   --   --   --  0.95  --  0.95   GFRESTIMATED  --   --   --   --   --  88  --  88   CATERINA  --   --   --   --   --  8.8  --  9.0    < > = values in this interval not displayed.     No results for input(s): \"CULT\" in the last 168 hours.    Imaging:  Recent Results (from the past 24 hour(s))   CT Chest/Abdomen/Pelvis w Contrast    Narrative    CT CHEST/ABDOMEN/PELVIS WITH CONTRAST November 1, 2023 1:12 PM    CLINICAL HISTORY: Breakthrough stroke on warfarin, evaluate for occult  malignancy.    TECHNIQUE: CT scan of the chest, abdomen, and pelvis was performed  following injection of IV contrast. Multiplanar reformats were  obtained. Dose reduction techniques were used.   CONTRAST: " 100mL Isovue-370.    COMPARISON: January 18, 2022.    FINDINGS:   LUNGS AND PLEURA: No masses or effusions.    MEDIASTINUM/AXILLAE: No lymphadenopathy. No thoracic aortic aneurysms.  Stable valve and aortic root surgical change.    CORONARY ARTERY CALCIFICATIONS: Severe and/or stents.    HEPATOBILIARY: No significant mass or bile duct dilatation. No  calcified gallstones. Cholecystectomy.    PANCREAS: No significant mass, duct dilatation, or inflammatory  change.    SPLEEN: Normal size.    ADRENAL GLANDS: No significant nodules.    KIDNEYS/BLADDER: No significant mass, stones, or hydronephrosis.    BOWEL: No obstruction or inflammatory change.    PELVIC ORGANS: No pelvic masses.    ADDITIONAL FINDINGS: No abdominal pelvic adenopathy or free fluid.  There are moderate atherosclerotic changes of the visualized aorta and  its branches. There is no evidence of aortic dissection or aneurysm.    MUSCULOSKELETAL: No frankly destructive bony lesions. Marked  degenerative changes of the spine.      Impression    IMPRESSION: No occult malignancy demonstrated.    DALTON RIOS MD         SYSTEM ID:  [id number]         Tin Etienne DO  [facility]  [address] [facility] [place] [address]  Securely message with [employer] (more info)  Text page via [facility] Paging/Directory   11/02/2023

## 2023-11-02 NOTE — PLAN OF CARE
"Physical Therapy Discharge Summary    Reason for therapy discharge:    Discharged to home with outpatient therapy.    Progress towards therapy goal(s). See goals on Care Plan in [facility] electronic health record for goal details.  Goals partially met.  Barriers to achieving goals:   discharge from facility.    Therapy recommendation(s):    \"Pt presents close to baseline with mobility. Pt currently independent with bed mobility and transfers, SBA for ambulation with SEC or no AD. Pt demonstrates gait impairments due to prior L LE weakness and pain from previous AMANDA and hematoma. Pt normally wears a 3/8\" L heel lift in his shoe that has gone missing during hospital stay. Pt able to negotiate stairs to access all levels of his home. Recommend pt return home with OP PT to address L LE weakness and progress gait dynamics and balance.\"      "

## 2023-11-02 NOTE — PHARMACY-CONSULT NOTE
Pharmacy Consult to evaluate for medication related stroke core measures    Todd S Aschoff, 67 year old male admitted for hearing loss on 10/31/2023.    Thrombolytic was not given because of Time from onset contraindications, Clinical contraindications    VTE Prophylaxis SCDs /PCDs placed on 11/1/23, as appropriate prior to end of hospital day 2.    Antithrombotic: No aspirin or P2Y12 inhibitors per neuro    Anticoagulation if history of A-fib/flutter: Patient on warfarin; continue anticoagulation on discharge to meet quality measures, unless contraindicated.    LDL Cholesterol Calculated   Date Value Ref Range Status   10/31/2023 93 <=100 mg/dL Final   04/18/2021 164 (H) <100 mg/dL Final     Comment:     Above desirable:  100-129 mg/dl  Borderline High:  130-159 mg/dL  High:             160-189 mg/dL  Very high:       >189 mg/dl         Patient currently receiving Lipitor (atorvastatin) continue statin on discharge to meet quality measures, unless contraindicated.    Recommendations: None at this time    Thank you for the consult.    ARMEN FARIA [facility] 11/2/2023 8:41 AM

## 2023-11-02 NOTE — PHARMACY-ANTICOAGULATION SERVICE
Clinical Pharmacy- Warfarin Discharge Note  This patient is currently on warfarin for the treatment of Mechanical heart valve and A fib.  INR Goal= 2.5-3.5  Expected length of therapy lifetime.    Warfarin PTA Regimen: 7.5 mg Tuesday and Friday, 5 mg ROW      Anticoagulation Dose History  More data exists         Latest Ref Rng & Units 7/19/2023 8/29/2023 9/29/2023 10/27/2023 10/31/2023 11/1/2023 11/2/2023   Recent Dosing and Labs   warfarin ANTICOAGULANT (COUMADIN) tablet 5 mg - - - - - - 5 mg, $Given -   warfarin ANTICOAGULANT (COUMADIN) tablet 7.5 mg - - - - - 7.5 mg, $Given - -   INR 0.85 - 1.15 3.2  3.1  2.7  2.7  2.70  3.09  3.02  4.04        Checked sample integrity ok.       Vitamin K doses administered during the last 7 days: none  FFP administered during the last 7 days: none  His INR increased from 3.02 to 4.04 today. Given he just had a stroke, MD ok with INR running slightly elevated rather than decreasing the dose and having a subtherapeutic INR. Continue home warfarin regimen of 7.5 mg Tuesday and Friday, 5 mg ROW    The patient should have an INR checked  tomorrow, 11/3/23  given supratherapeutic INR today.       Teresita Garcia, PharmD  November 2, 2023

## 2023-11-02 NOTE — PROGRESS NOTES
VSS. Denies pain. Neuro unchanged - increased left hearing loss with tinnitus. Loose stools, provider ordered imodium. Tolerating diet. BG stable. Tele - SB.

## 2023-11-03 ENCOUNTER — APPOINTMENT (OUTPATIENT)
Dept: CT IMAGING | Facility: CLINIC | Age: 68
End: 2023-11-03
Attending: EMERGENCY MEDICINE
Payer: COMMERCIAL

## 2023-11-03 ENCOUNTER — APPOINTMENT (OUTPATIENT)
Dept: MRI IMAGING | Facility: CLINIC | Age: 68
End: 2023-11-03
Attending: EMERGENCY MEDICINE
Payer: COMMERCIAL

## 2023-11-03 ENCOUNTER — HOSPITAL ENCOUNTER (EMERGENCY)
Facility: CLINIC | Age: 68
Discharge: HOME | End: 2023-11-03
Attending: EMERGENCY MEDICINE | Admitting: EMERGENCY MEDICINE
Payer: COMMERCIAL

## 2023-11-03 ENCOUNTER — APPOINTMENT (OUTPATIENT)
Dept: MRI IMAGING | Facility: CLINIC | Age: 68
End: 2023-11-03
Attending: PHYSICIAN ASSISTANT
Payer: COMMERCIAL

## 2023-11-03 ENCOUNTER — ANTICOAGULATION THERAPY VISIT (OUTPATIENT)
Dept: ANTICOAGULATION | Facility: CLINIC | Age: 68
End: 2023-11-03

## 2023-11-03 ENCOUNTER — PATIENT OUTREACH (OUTPATIENT)
Dept: CARE COORDINATION | Facility: CLINIC | Age: 68
End: 2023-11-03

## 2023-11-03 ENCOUNTER — LAB (OUTPATIENT)
Dept: LAB | Facility: CLINIC | Age: 68
End: 2023-11-03
Payer: COMMERCIAL

## 2023-11-03 ENCOUNTER — TELEPHONE (OUTPATIENT)
Dept: PEDIATRICS | Facility: CLINIC | Age: 68
End: 2023-11-03

## 2023-11-03 VITALS
DIASTOLIC BLOOD PRESSURE: 86 MMHG | OXYGEN SATURATION: 96 % | TEMPERATURE: 97 F | RESPIRATION RATE: 18 BRPM | HEART RATE: 68 BPM | SYSTOLIC BLOOD PRESSURE: 139 MMHG

## 2023-11-03 DIAGNOSIS — I48.0 PAF (PAROXYSMAL ATRIAL FIBRILLATION) (H): Primary | ICD-10-CM

## 2023-11-03 DIAGNOSIS — Z95.2 AORTIC VALVE PROSTHESIS PRESENT: ICD-10-CM

## 2023-11-03 DIAGNOSIS — I48.0 PAF (PAROXYSMAL ATRIAL FIBRILLATION) (H): ICD-10-CM

## 2023-11-03 DIAGNOSIS — R42 DIZZINESS: ICD-10-CM

## 2023-11-03 LAB
ANION GAP SERPL CALCULATED.3IONS-SCNC: 11 MMOL/L (ref 7–15)
APTT PPP: 61 SECONDS (ref 22–38)
BASOPHILS # BLD AUTO: 0.1 10E3/UL (ref 0–0.2)
BASOPHILS NFR BLD AUTO: 1 %
BUN SERPL-MCNC: 14.5 MG/DL (ref 8–23)
CALCIUM SERPL-MCNC: 9.4 MG/DL (ref 8.8–10.2)
CHLORIDE SERPL-SCNC: 104 MMOL/L (ref 98–107)
CREAT SERPL-MCNC: 1 MG/DL (ref 0.67–1.17)
DEPRECATED HCO3 PLAS-SCNC: 27 MMOL/L (ref 22–29)
EGFRCR SERPLBLD CKD-EPI 2021: 83 ML/MIN/1.73M2
EOSINOPHIL # BLD AUTO: 0.2 10E3/UL (ref 0–0.7)
EOSINOPHIL NFR BLD AUTO: 3 %
ERYTHROCYTE [DISTWIDTH] IN BLOOD BY AUTOMATED COUNT: 13.1 % (ref 10–15)
GLUCOSE BLDC GLUCOMTR-MCNC: 114 MG/DL (ref 70–99)
GLUCOSE SERPL-MCNC: 101 MG/DL (ref 70–99)
HCT VFR BLD AUTO: 49.6 % (ref 40–53)
HGB BLD-MCNC: 17 G/DL (ref 13.3–17.7)
IMM GRANULOCYTES # BLD: 0 10E3/UL
IMM GRANULOCYTES NFR BLD: 0 %
INR BLD: 4.6 (ref 0.9–1.1)
INR PPP: 3.66 (ref 0.85–1.15)
LYMPHOCYTES # BLD AUTO: 2 10E3/UL (ref 0.8–5.3)
LYMPHOCYTES NFR BLD AUTO: 31 %
MCH RBC QN AUTO: 32.9 PG (ref 26.5–33)
MCHC RBC AUTO-ENTMCNC: 34.3 G/DL (ref 31.5–36.5)
MCV RBC AUTO: 96 FL (ref 78–100)
MONOCYTES # BLD AUTO: 0.7 10E3/UL (ref 0–1.3)
MONOCYTES NFR BLD AUTO: 11 %
NEUTROPHILS # BLD AUTO: 3.4 10E3/UL (ref 1.6–8.3)
NEUTROPHILS NFR BLD AUTO: 54 %
NRBC # BLD AUTO: 0 10E3/UL
NRBC BLD AUTO-RTO: 0 /100
PLATELET # BLD AUTO: 231 10E3/UL (ref 150–450)
POTASSIUM SERPL-SCNC: 4.7 MMOL/L (ref 3.4–5.3)
RBC # BLD AUTO: 5.16 10E6/UL (ref 4.4–5.9)
SODIUM SERPL-SCNC: 142 MMOL/L (ref 135–145)
TROPONIN T SERPL HS-MCNC: 23 NG/L
TROPONIN T SERPL HS-MCNC: 25 NG/L
WBC # BLD AUTO: 6.3 10E3/UL (ref 4–11)

## 2023-11-03 PROCEDURE — 82962 GLUCOSE BLOOD TEST: CPT

## 2023-11-03 PROCEDURE — 84484 ASSAY OF TROPONIN QUANT: CPT | Performed by: EMERGENCY MEDICINE

## 2023-11-03 PROCEDURE — 70496 CT ANGIOGRAPHY HEAD: CPT

## 2023-11-03 PROCEDURE — 70450 CT HEAD/BRAIN W/O DYE: CPT | Mod: XU

## 2023-11-03 PROCEDURE — A9585 GADOBUTROL INJECTION: HCPCS | Performed by: EMERGENCY MEDICINE

## 2023-11-03 PROCEDURE — 36415 COLL VENOUS BLD VENIPUNCTURE: CPT | Performed by: EMERGENCY MEDICINE

## 2023-11-03 PROCEDURE — 70549 MR ANGIOGRAPH NECK W/O&W/DYE: CPT

## 2023-11-03 PROCEDURE — 85730 THROMBOPLASTIN TIME PARTIAL: CPT | Performed by: EMERGENCY MEDICINE

## 2023-11-03 PROCEDURE — 36416 COLLJ CAPILLARY BLOOD SPEC: CPT

## 2023-11-03 PROCEDURE — 70544 MR ANGIOGRAPHY HEAD W/O DYE: CPT

## 2023-11-03 PROCEDURE — 85025 COMPLETE CBC W/AUTO DIFF WBC: CPT | Performed by: EMERGENCY MEDICINE

## 2023-11-03 PROCEDURE — 99285 EMERGENCY DEPT VISIT HI MDM: CPT | Mod: 25

## 2023-11-03 PROCEDURE — 70553 MRI BRAIN STEM W/O & W/DYE: CPT

## 2023-11-03 PROCEDURE — 93005 ELECTROCARDIOGRAM TRACING: CPT

## 2023-11-03 PROCEDURE — 85610 PROTHROMBIN TIME: CPT | Performed by: EMERGENCY MEDICINE

## 2023-11-03 PROCEDURE — 255N000002 HC RX 255 OP 636: Performed by: EMERGENCY MEDICINE

## 2023-11-03 PROCEDURE — 85610 PROTHROMBIN TIME: CPT

## 2023-11-03 PROCEDURE — 250N000013 HC RX MED GY IP 250 OP 250 PS 637: Performed by: EMERGENCY MEDICINE

## 2023-11-03 PROCEDURE — 250N000011 HC RX IP 250 OP 636: Performed by: EMERGENCY MEDICINE

## 2023-11-03 PROCEDURE — 70546 MR ANGIOGRAPH HEAD W/O&W/DYE: CPT

## 2023-11-03 PROCEDURE — 80048 BASIC METABOLIC PNL TOTAL CA: CPT | Performed by: EMERGENCY MEDICINE

## 2023-11-03 RX ORDER — MECLIZINE HYDROCHLORIDE 25 MG/1
25 TABLET ORAL 3 TIMES DAILY PRN
Qty: 21 TABLET | Refills: 0 | Status: SHIPPED | OUTPATIENT
Start: 2023-11-03 | End: 2023-11-10

## 2023-11-03 RX ORDER — GADOBUTROL 604.72 MG/ML
10 INJECTION INTRAVENOUS ONCE
Status: COMPLETED | OUTPATIENT
Start: 2023-11-03 | End: 2023-11-03

## 2023-11-03 RX ORDER — MECLIZINE HYDROCHLORIDE 25 MG/1
25 TABLET ORAL ONCE
Status: COMPLETED | OUTPATIENT
Start: 2023-11-03 | End: 2023-11-03

## 2023-11-03 RX ORDER — IOPAMIDOL 755 MG/ML
500 INJECTION, SOLUTION INTRAVASCULAR ONCE
Status: COMPLETED | OUTPATIENT
Start: 2023-11-03 | End: 2023-11-03

## 2023-11-03 RX ADMIN — GADOBUTROL 10 ML: 604.72 INJECTION INTRAVENOUS at 17:27

## 2023-11-03 RX ADMIN — MECLIZINE HYDROCHLORIDE 25 MG: 25 TABLET ORAL at 16:58

## 2023-11-03 RX ADMIN — IOPAMIDOL 67 ML: 755 INJECTION, SOLUTION INTRAVENOUS at 15:31

## 2023-11-03 ASSESSMENT — ACTIVITIES OF DAILY LIVING (ADL)
ADLS_ACUITY_SCORE: 37

## 2023-11-03 NOTE — PROGRESS NOTES
"AUDIOLOGY REPORT    SUBJECTIVE: Todd S Aschoff is a 67 year old male who was seen in the Audiology Clinic at the [facility] for audiologic evaluation, referred by, Claudia Hernandez PA-C. They were accompanied today by their self.   Nicko presented to ED 10/31/2023 for left sided hearing loss. Patient has significant history of CVA's, MRI obtained with evidence of right temporal infarct. Patient states 10/30/2023 he had a stroke and lost the hearing in his left ear, hearing came back 2 days later then he had another \"phantom\" stoke 11/2/2023 and then he lost the hearing in his left ear again, and he still cannot hear out of the left ear. Nicko wears binaural hearing aids from [employer]. Patient reports the right ear was better than the left ear prior to strokes, but now he cannot hear at all out of the left. Left ear feels plugged and he hears a crackle sound in the left ear as of last week, has bilateral high pitched ringing normally. Patient reports he was dizzy during/after stroke last week, but not currently having dizziness. Worked in an [employer] shop for 10 years w/o hearing protection.  Denied otalgia, otorrhea, and history of ear surgery.      OBJECTIVE:  Abuse Screening:  Do you feel unsafe at home or work/school? No  Do you feel threatened by someone? No  Does anyone try to keep you from having contact with others, or doing things outside of your home? No  Physical signs of abuse present? No     Fall Risk Screen:  1. Have you fallen two or more times in the past year? Yes  2. Have you fallen and had an injury in the past year? Yes    PCP aware of falls.     Otoscopic exam indicates ears are clear of cerumen bilaterally     Pure Tone Thresholds assessed using conventional audiometry with fair-good reliability from 250-8000 Hz bilaterally using insert earphones and circumaural headphones.     RIGHT:  Mild sloping to a moderately severe sensorineural hearing loss.    LEFT:  Profound " presumably sensorineural hearing loss    Reliability was fair to good, some false positives.     Tympanogram:    RIGHT: Shallow mobility    LEFT:   Normal eardrum mobility    Reflexes (reported by stimulus ear):  Did not test due to equipment limitations     Speech Reception Threshold:    RIGHT: 45 dB HL    Speech Detection Threshold    LEFT:   90 dB HL    Word Recognition Score:     RIGHT: 92% at 85 dB HL using NU-6 recorded word list.    LEFT:   0% at 90 dB HL using NU-6 recorded word list.     Nicko would not tolerate a louder presentation level in the left ear.    Dino: Negative at multiple frequencies.     ASSESSMENT: Today's results reveal mild sloping to a moderately severe sensorineural hearing loss in the right ear, and a profound presumably sensorineural hearing loss in the left ear. Today s results were discussed with the patient in detail.     PLAN:  Discussed patient's history and results with ENT (Dr. Oliveira) in clinic today. Dr. Oliveira does not think a steroid injection is warranted at this time and does not need to see the patient today. Dr. Oliveira recommended a repeat audio in 6 months. Scheduled patient to return in 6 months (5/7/2024) for repeat audio and follow up with Dr. Oliveira. Please call this clinic with questions regarding these results or recommendations.    Sarabjit Hollis., CCC-A  [place] Licensed Audiologist #[id number]

## 2023-11-03 NOTE — PROGRESS NOTES
[facility]    Stroke Telephone Note    I was called by Priyanka Ardon on 11/03/23 regarding patient Nicko S Aschoff. The patient is a 67 year old male with pertinent past medical history of  aortic dissection repair and mechanical aortic valve replacement in 1992 and paroxysmal atrial fibrillation on warfarin, chronic back pain/neuropathy, HTN, HLD. Recent admission 10/31/23 with persistent L ear hearing loss, tinnitus, and transient gait instability. INR 2.7 on that presentation. Found to have an incidental late acute/early subacute Right posterior temporal infarct and CTA with severe stenosis distal Left PCA. Stroke suspected 2/2 small vessel disease v cardioembolic breatk through despite therapeutic anticoagulation. CT cap negative for malignancy. No intracardiac thrombus on TTE, switched to high intensity statin and recommended follow-up with ENT for suspected inner ear pathology/peripheral vertigo.     He presents today to [facility] with recurrent L ear hearing loss and gait instability that started around 1420 today. He reports that he was asymptomatic by the time he was recently discharged. On ED provider exam no limb ataxia.                             BP (!) 147/89   Pulse 75   Temp 97  F (36.1  C) (Temporal)   Resp 16   SpO2 98%      Stroke Code Data (for stroke code without tele)  Stroke code activated 11/03/23   1525   Stroke provider first response  11/03/23   1529            Last known normal 11/03/23   1419        Time of discovery   (or onset of symptoms) 11/03/23   1420   Head CT read by Stroke Neuro Dr/Provider 11/03/23   1536   Was stroke code de-escalated? Yes 11/03/23 1605          Imaging Findings  CT head:   1.  No acute hemorrhage or infarct.  2.  Old left parieto-occipital, right occipital and bilateral  cerebellar infarcts    HEAD CTA:  1.  No proximal large vessel occlusion or high-grade intracranial  stenosis.  2.  Prominent vein over the left tentorium has  "a density similar to  that of adjacent arteries, greater than adjacent veins, and while not  specific raises the possibility of an underlying vascular lesion such  as a fistula or draining AVM. Recommend follow-up brain MRA to  evaluate for high flow venous structures.  -of note the formal read does not mention the prior severe short segment stenosis distal L PCA however, on this provider's review of the imaging from 10/31/23 and today, it does not appear significantly changed (however PACS is pixelating images), reviewed also with attending Dr. Killian, we will see what MRA shows     NECK CTA:  1.  Mild carotid artery atherosclerosis.   2.  No dissection or hemodynamically significant narrowing in the neck  by NASCET criteria.    Intravenous Thrombolysis  Not given due to:   - DOAC dose within 48 hours or INR > 1.7    Endovascular Treatment  Not initiated due to absence of proximal vessel occlusion    Impression  Recurrent L hearing loss and gait instability, highest suspicion for peripheral etiology/inner ear pathology but will rule out a potential brain stem stroke    L tentorial prominent vein, evaluate for vascular lesion or CVST    Recommendations   -MRI brain w/wo contrast with coronal DWI and thin cuts through brain stem  -MRA brain wo contrast and MRV brain to evaluate for vascular lesion  -INR supratherapeutic 4.6  -please page stroke team once resulted so we can review images and make further recommendations    Case discussed with vascular neurology attending Dr. Killian    My recommendations are based on the information provided over the phone by Todd S Aschoff's in-person providers. They are not intended to replace the clinical judgment of his in-person providers. I was not requested to personally see or examine the patient at this time.    Claudia Hernandez PA-C  Vascular Neurology    To page me or covering stroke neurology team member, click here: [facility]  Choose \"On Call\" tab at top, then select " "\"NEUROLOGY/ALL SITES\" from middle drop-down box, press Enter, then look for \"stroke\" or \"telestroke\" for your site.   "

## 2023-11-03 NOTE — PROGRESS NOTES
[facility]    Background: Transitional Care Management program identified per system criteria and reviewed by [facility] team for possible outreach.    Assessment: Upon chart review, [facility] Team member will not proceed with patient outreach related to this episode of Transitional Care Management program due to reason below:    Patient has a follow up appointment with an appropriate provider today for hospital discharge    Patient had a follow-up call yesterday with a Registered Nurse from [facility] for Anticoagulation (Hospital Discharge Review). No [facility] outreach call needed at this time.    Plan: Transitional Care Management episode addressed appropriately per reason noted above.      BIRD Blackman  [phone]  [facility]     *Connected Care Resource Team does NOT follow patient ongoing. Referrals are identified based on internal discharge reports and the outreach is to ensure patient has an understanding of their discharge instructions.

## 2023-11-03 NOTE — ED TRIAGE NOTES
Pt reports that 1 hour ago he developed loss of hearing in his left ear and balance issues. Pt states that he was recently diagnosed with a stroke that exhibited the same symptoms. Pt reports that he was discharge from the hospital yesterday and symptoms from the stroke has resolved. Pt reports that he felt normal this morning and did not have any of these symptoms.

## 2023-11-03 NOTE — TELEPHONE ENCOUNTER
Patient calling and states he missed his INR appointment and wants to know if he can be worked in.  [facility]  Ivett Whelan RN

## 2023-11-03 NOTE — DISCHARGE SUMMARY
Hospitalist Discharge Summary  [facility]    Todd S Aschoff MRN# [id number]   YOB: 1955 Age: 67 year old     Date of Admission:  10/31/2023  Date of Discharge:  11/2/2023 12:41 PM  Admitting Physician:  Tarun Caruso MD  Discharge Physician:  Tin Etienne DO  Discharging Service:  Hospitalist     Primary Provider: Herve Morgan          Discharge Diagnosis:     L Sided Hearing Loss  Acute/Subacute Right Posterior Temporal Lobe Infarct  L Posterior Cerebral Artery Severe Stenosis  - Appreciate Stroke Neurology recommendations  - Echo showed EF 55-60%, no LV thrombus, mechanical aortic valve, suboptimal study  - Continue atorvastatin  - PT/OT  - Stroke education  - Continue warfarin  - Resume lisinopril     Paroxysmal Atrial Fibrillation on Warfarin  Thoracic Aortic Dissection s/p Repair with Mechanical Aortic Valve  Warfarin Coagulopathy  - Appreciate Pharmacy dosing warfarin  - No obvious subtherapeutic INR and pt reports compliance with his warfarin  - Goal INR 2.5-3.5     Hypertension  Hyperlipidemia  - Resume lisinopril     Chronic Medical Problems:  Hypothyroidism  Chronic Back Pain with Neuropathy  Anxiety/Depression  Mild Cognitive Impairment  BPH  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  In the emergency department, the patient was found have a temperature of 98.6  F, blood pressure 107/69, heart rate 64, respiratory rate 20, SPO2 92% on room air.  Initial lab  "work showed INR 2.7, remaining CBC and BMP within normal limits.  CTA head and neck showed short segment severe stenosis within the distal posterior cerebral artery on the left, mild atherosclerotic plaque both carotid bifurcations without significant stenosis.  MRI brain showed punctate late acute versus early subacute right posterior temporal infarct, chronic multifocal infarcts that have not significantly changed since 2021.  Stroke neurology was consulted to see the patient.  Echocardiogram returned showing EF 55 to 60% without evidence of LV thrombus though noted to be a suboptimal study.  The patient's simvastatin was exchanged for atorvastatin.  PT/OT were consulted to see the patient.      The patient was seen, examined, and counseled on this day. The hospitalization and plan of care was reviewed with the patient extensively. All questions were addressed and the patient agreed to follow-up as noted above.           Allergies:     Allergies   Allergen Reactions    Blood Transfusion Related (Informational Only) Other (See Comments)     Patient has a history of a clinically significant antibody against RBC antigens.  A delay in compatible RBCs may occur.      Ciprofloxacin      \"Went nuts\"    Gabapentin      Severe behavioral disturbances              Discharge Medications:     Discharge Medication List as of 11/2/2023 10:25 AM        START taking these medications    Details   atorvastatin (LIPITOR) 40 MG tablet Take 1 tablet (40 mg) by mouth every evening, Disp-30 tablet, R-0, E-Prescribe           CONTINUE these medications which have NOT CHANGED    Details   acetaminophen (TYLENOL) 500 MG tablet Take 1,000 mg by mouth 2 times daily, Historical      azelastine-fluticasone (DYMISTA) 137-50 MCG/ACT nasal spray Spray 1 spray into both nostrils daily, Historical      cyclobenzaprine (FLEXERIL) 10 MG tablet TAKE ONE TABLET BY MOUTH THREE TIMES DAILY AS NEEDED FOR MUSCLE SPASM., Disp-270 tablet, R-3, E-Prescribe "      diazepam (VALIUM) 5 MG tablet Take 1 tablet (5 mg) by mouth nightly as needed for anxiety, Disp-30 tablet, R-0, E-Prescribe      donepezil (ARICEPT) 10 MG tablet Take 1 tablet (10 mg) by mouth At Bedtime, Disp-90 tablet, R-3, E-PrescribeProfile Rx: patient will contact pharmacy when needed      folic acid 5 MG CAPS Take 5 mg by mouth daily, Historical      guanFACINE (TENEX) 1 MG tablet Take 1 tablet (1 mg) by mouth At Bedtime, Disp-90 tablet, R-1, E-Prescribe      levothyroxine (SYNTHROID/LEVOTHROID) 175 MCG tablet Take 1 tablet (175 mcg) by mouth daily, Disp-90 tablet, R-3, E-Prescribe      lisinopril (ZESTRIL) 10 MG tablet Take 1 tablet (10 mg) by mouth daily, Disp-90 tablet, R-3, E-PrescribeProfile Rx: patient will contact pharmacy when needed      pregabalin (LYRICA) 100 MG capsule Take 1 capsule (100 mg) by mouth 3 times daily Do not take if sleepy/sedated., Disp-270 capsule, R-3, E-Prescribe      propafenone (RYTHMOL) 150 MG TABS tablet Take 1 tablet (150 mg) by mouth every 8 hours, Disp-270 tablet, R-4, E-Prescribe      venlafaxine (EFFEXOR XR) 75 MG 24 hr capsule Take 3 capsules (225 mg) by mouth daily, Disp-270 capsule, R-3, E-Prescribe      warfarin ANTICOAGULANT (COUMADIN) 5 MG tablet Take by mouth daily 7.5mg Tuesday and Friday and 5mg ROW, Historical           STOP taking these medications       simvastatin (ZOCOR) 40 MG tablet Comments:   Reason for Stopping:                      Condition on Discharge:     Discharge condition: Fair   Discharge vitals: Blood pressure (!) 151/91, pulse 71, temperature 97.7  F (36.5  C), temperature source Oral, resp. rate 18, SpO2 97%.   Code status on discharge: Full Code      BASIC PHYSICAL EXAMINATION:  GENERAL: No apparent distress.  CARDIOVASCULAR: Regular rate and rhythm without murmurs.  PULMONARY: Clear to auscultation bilaterally.   GASTROINTESTINAL: Abdomen soft, non-tender.  EXTREMITIES: No edema, pulses intact.  NEUROLOGIC: No focal deficits.             History of Illness:   See detailed admission note for full details.               Procedures excluding imaging which is summarized below:     Please see details in the electronic medical record.           Consultations:     PHARMACY TO DOSE WARFARIN  PATIENT [facility] IP CONSULT  NEUROLOGY IP STROKE CONSULT  SPEECH LANGUAGE PATH ADULT IP CONSULT  PHARMACY IP CONSULT  PHARMACY IP CONSULT  PHARMACY IP CONSULT  PHYSICAL THERAPY ADULT IP CONSULT  OCCUPATIONAL THERAPY ADULT IP CONSULT  REHAB ADMISSIONS LIAISON IP CONSULT  CARE MANAGEMENT / SOCIAL WORK IP CONSULT  SMOKING CESSATION PROGRAM IP CONSULT          Significant Results:     Results for orders placed or performed during the hospital encounter of 10/31/23   MR Brain w/o & w Contrast    Narrative    EXAM: MR BRAIN W/O and W CONTRAST  LOCATION: [facility]  DATE: 10/31/2023    INDICATION: Acute loss of hearing, with functioning hearing aid  COMPARISON:  MRI brain 06/19/2021.  CONTRAST: 14 mL Gadavist  TECHNIQUE: Routine multiplanar multisequence head MRI without and with intravenous contrast.    FINDINGS:  INTRACRANIAL CONTENTS:  Punctate late acute versus early subacute right posterior temporal infarct (series 3, image 56).. No mass, acute hemorrhage, or extra-axial fluid collections. Patchy nonspecific T2/FLAIR hyperintensities within the cerebral white   matter most consistent with mild to moderate chronic microvascular ischemic change. Unchanged chronic right temporooccipital, left temporoparietal, and bilateral cerebellar infarcts. Normal ventricles and sulci. Normal position of the cerebellar tonsils.   No pathologic contrast enhancement.    SELLA: No abnormality accounting for technique.    OSSEOUS STRUCTURES/SOFT TISSUES: Normal marrow signal. The major intracranial vascular flow voids are maintained. Incidental left cerebellar DVA.    ORBITS: Prior right cataract surgery. Visualized portions of the orbits are otherwise  unremarkable.     SINUSES/MASTOIDS: No paranasal sinus mucosal disease. No middle ear or mastoid effusion.       Impression    IMPRESSION:  1.  Punctate late acute versus early subacute right posterior temporal infarct.  2.  No mass effect or hemorrhage.  3.  Chronic multifocal infarcts are not significantly changed since 06/19/2021.    ALISSON Antunez was contacted by me on 10/31/2023 7:09 PM CDT.   CTA Head Neck with Contrast    Narrative    EXAM: CTA HEAD NECK W CONTRAST  LOCATION: [facility]  DATE: 10/31/2023    INDICATION: Right temporal infarct with acute hearling loss.  COMPARISON: MRI brain 10/31/2023. CTA [place] [facility] [place] and neck 06/18/2021.  CONTRAST: 67 mL Isovue-370.  TECHNIQUE: Head and neck CT angiogram with IV contrast. Axial helical CT images of the head and neck vessels obtained during the arterial phase of intravenous contrast administration. Axial 2D reconstructed images and multiplanar 3D MIP reconstructed   images of the head and neck vessels were performed by the technologist. Dose reduction techniques were used. All stenosis measurements made according to NASCET criteria unless otherwise specified.    FINDINGS:     HEAD CTA:  ANTERIOR CIRCULATION: Normal enhancement is seen within the intracranial internal carotid arteries, anterior cerebral arteries, and middle cerebral arteries. No vascular cutoff, aneurysm, or flow-limiting stenosis.    POSTERIOR CIRCULATION: Left vertebral artery is dominant. There is mild calcified atherosclerotic plaque within the proximal internal right vertebral artery with mild associated luminal narrowing. There is short segment severe narrowing of the distal   left posterior cerebral artery best visualized on the axial MIP images, image 28 of series 8. This is new compared to the 2021 prior. Intracranial vertebral arteries, basilar artery and posterior cerebral arteries are otherwise widely patent. Left   posterior communicating artery is patent.  Right posterior communicating artery is not identified.    DURAL VENOUS SINUSES: Dural venous sinuses are not well evaluated on the basis of this exam.    NECK CTA:  RIGHT CAROTID: No measurable stenosis or dissection. Mild atherosclerotic plaque right carotid bulb/bifurcation.    LEFT CAROTID: No measurable stenosis or dissection. Mild atherosclerotic plaque left carotid bulb/bifurcation.    VERTEBRAL ARTERIES: No focal stenosis or dissection. Left vertebral artery is larger than the right.    AORTIC ARCH: Left common carotid artery arises from the takeoff of the right brachiocephalic artery. Scattered coarse atherosclerotic plaque is seen within the aortic arch.    NONVASCULAR STRUCTURES: Visualized lung apices are clear. Osseous structures are without suspicious lytic or blastic foci. Prior discectomy and anterior fusion C4 through C6. Hardware is intact without evidence of failure or loosening. Solid ankylosis   across the interspaces at these levels. Transitional segment disease with advanced disc degenerative changes C3-C4.      Impression    IMPRESSION:   HEAD CTA:   1.  Short segment severe stenosis within the distal posterior cerebral artery on the left, new compared to the 06/18/2021 prior.    NECK CTA:  1.  Mild atherosclerotic plaque both carotid bifurcations/bulbs without significant stenosis either cervical carotid or vertebral system by modified NASCET criteria.   CT Chest/Abdomen/Pelvis w Contrast    Narrative    CT CHEST/ABDOMEN/PELVIS WITH CONTRAST November 1, 2023 1:12 PM    CLINICAL HISTORY: Breakthrough stroke on warfarin, evaluate for occult  malignancy.    TECHNIQUE: CT scan of the chest, abdomen, and pelvis was performed  following injection of IV contrast. Multiplanar reformats were  obtained. Dose reduction techniques were used.   CONTRAST: 100mL Isovue-370.    COMPARISON: January 18, 2022.    FINDINGS:   LUNGS AND PLEURA: No masses or effusions.    MEDIASTINUM/AXILLAE: No lymphadenopathy.  No thoracic aortic aneurysms.  Stable valve and aortic root surgical change.    CORONARY ARTERY CALCIFICATIONS: Severe and/or stents.    HEPATOBILIARY: No significant mass or bile duct dilatation. No  calcified gallstones. Cholecystectomy.    PANCREAS: No significant mass, duct dilatation, or inflammatory  change.    SPLEEN: Normal size.    ADRENAL GLANDS: No significant nodules.    KIDNEYS/BLADDER: No significant mass, stones, or hydronephrosis.    BOWEL: No obstruction or inflammatory change.    PELVIC ORGANS: No pelvic masses.    ADDITIONAL FINDINGS: No abdominal pelvic adenopathy or free fluid.  There are moderate atherosclerotic changes of the visualized aorta and  its branches. There is no evidence of aortic dissection or aneurysm.    MUSCULOSKELETAL: No frankly destructive bony lesions. Marked  degenerative changes of the spine.      Impression    IMPRESSION: No occult malignancy demonstrated.    DALTON RIOS MD         SYSTEM ID:  [id number]   Echocardiogram Limited     Value    LVEF  55-60%    Narrative    [id number]  [id number]  [id number]  [id number]^AUNG^CAROLINA     [facility]  Echocardiography Laboratory  [address] [place] [address]     Name: ASCHOFF, TODD S  MRN: [id number]  : 1955  Study Date: 2023 07:31 AM  Age: 67 yrs  Gender: Male  Patient Location: [facility]  Reason For Study: Cerebrovascular Incident  Ordering Physician: CAROLINA HORAN  Referring Physician: Herve Morgan MD  Performed By: Aarti Kaba RDCS     BSA: 2.6 m2  Height: 71 in  Weight: 314 lb  HR: 60  BP: 116/84 mmHg  ______________________________________________________________________________  Procedure  Limited Portable Echo Adult. Optison (NDC #1716-6160) given intravenously.  ______________________________________________________________________________  Interpretation Summary     1. Technically difficult, suboptimal study.  2. The left ventricle is normal in size. Left ventricular systolic function  is  normal. The visual ejection fraction is 55-60%. Septal motion is consistent  with post-operative state. There is no thrombus seen in the left ventricle.  3. Mild biatrial enlargement.  4. There is a mechanical aortic valve. (The patient is status post thoracic  aortic dissection repair and mechanical aortic valve replacement in 1992). The  peak AoV pressure gradient is 13.2 mmHg. These values are within normal limits  for this type of prosthesis. There is trace aortic regurgitation.  5. No pericardial effusion.  6. In direct comparison to the previous study dated 09/08/2023, there has been  a mild interval improvement in left ventricular systolic function.  ______________________________________________________________________________  Left Ventricle  The left ventricle is normal in size. Left ventricular systolic function is  normal. The visual ejection fraction is 55-60%. Septal motion is consistent  with post-operative state. There is no thrombus seen in the left ventricle.     Right Ventricle  The right ventricle is normal size. The right ventricular systolic function is  normal.     Atria  There is mild biatrial enlargement.     Mitral Valve  There is mild mitral annular calcification. There is trace mitral  regurgitation.     Tricuspid Valve  There is trace tricuspid regurgitation. The right ventricular systolic  pressure is approximated at 21.9 mmHg plus the right atrial pressure.     Aortic Valve  There is a mechanical aortic valve. (The patient is status post thoracic  aortic dissection repair and mechanical aortic valve replacement in 1992). The  peak AoV pressure gradient is 13.2 mmHg. These values are within normal limits  for this type of prosthesis. There is trace aortic regurgitation.     Pulmonic Valve  There is trace pulmonic valvular regurgitation. There is no pulmonic valvular  stenosis.     Vessels  Inferior vena cava not well visualized for estimation of right atrial  pressure.      Pericardium  There is no pericardial effusion.     Rhythm  Sinus rhythm was noted.  ______________________________________________________________________________  MMode/2D Measurements & Calculations  IVC diam: 2.5 cm     LA dimension: 6.0 cm  LVOT diam: 2.1 cm  LVOT area: 3.5 cm2  LA Volume (BP): 101.0 ml  LA Volume Index (BP): 39.6 ml/m2  RV Base: 4.4 cm     Doppler Measurements & Calculations  Ao V2 max: 181.9 cm/sec  Ao max P.2 mmHg  PA acc time: 0.13 sec  TR max jeremiah: 234.1 cm/sec  TR max P.9 mmHg     ______________________________________________________________________________  Report approved by: Nu Crespo 2023 10:02 AM           *Note: Due to a large number of results and/or encounters for the requested time period, some results have not been displayed. A complete set of results can be found in Results Review.       Transthoracic Echocardiogram Results:  No results found for this or any previous visit (from the past 4320 hour(s)).             Pending Results:     Unresulted Labs Ordered in the Past 30 Days of this Admission       No orders found from 10/1/2023 to 2023.                        Discharge Instructions and Follow-Up:     Discharge instructions and follow-up:   Discharge Procedure Orders   Adult Audiology  Referral   Standing Status: Future   Referral Priority: Priority: 1-2 Weeks Referral Type: Audiology   Referral Reason: Assess and Evaluate   Requested Specialty: Audiology   Number of Visits Requested: 1     Adult Sleep Eval & Management Referral   Standing Status: Future   Referral Priority: Routine: Next available opening Referral Type: Consultation   Number of Visits Requested: 1     Physical Therapy Referral   Standing Status: Future   Referral Priority: Routine Referral Type: Rehab Therapy Physical Therapy   Number of Visits Requested: 1     Reason for your hospital stay   Order Comments: Acute vs Subacture Right Posterior Temporal Lobe Stroke, Left  Posterior Cerebral Artery Severe Stenosis     Activity   Order Comments: Your activity upon discharge: activity as tolerated     Order Specific Question Answer Comments   Is discharge order? Yes      Follow-up and recommended labs and tests    Order Comments: Follow up with primary care provider, Herve Morgan, within 7 days for hospital follow- up.  No follow up labs or test are needed.    Please recheck your INR on Friday.    Recommend follow up with Stroke Neurology in 6-8 weeks.    Recommend follow up with ENT and Sleep Medicine.     Diet   Order Comments: Follow this diet upon discharge: Orders Placed This Encounter      Combination Diet Low Saturated Fat Na <2400mg Diet     Order Specific Question Answer Comments   Is discharge order? Yes      Stroke Hospital Follow Up (for neurologist use only)   Standing Status: Future Standing Exp. Date: 05/01/25   Order Comments: [employer] will call you to coordinate care as prescribed by your provider. If you don t hear from a representative within 2 business days, please call [phone].    [employer] will call you to coordinate care as prescribed by your provider. If you don t hear from a representative within 2 business days, please call [phone].       Order Specific Question Answer Comments   Schedule Patient With: Any Stroke THOR    Specific Diagnosis: R posterior temporal infarct, suspect SVID v cardioembolic    Follow up range in weeks (after discharge) 6-8    Contact: Patient    Scheduling Instructions: [employer] will call you to coordinate care as prescribed by your provider. If you don t hear from a representative within 2 business days, please call [phone].           Total time spent in face to face contact with the patient and coordinating discharge was:  34 Minutes    Tin Etienne DO  [facility] Hospitalist  [address] [place] [address]  11/03/2023

## 2023-11-03 NOTE — ED PROVIDER NOTES
History     Chief Complaint:  No chief complaint on file.     HPI   Todd S Aschoff is a 67 year old male with history of atrial fibrillation, BPH, hyperlipidemia, hypothyroidism, hypertension, renal insufficiency, and thoracic aortic dissection, who presents with difficulty balancing and left-sided hearing loss. The patient states that he was here recently for similar symptoms and was discharged with resolution of symptoms. He states that today after his INR appointment he began having symptoms again and made the decision to present to the ED. States his INR is 4.6. denies any fever, chills, nausea, vomiting, headache, or visual changes.     Independent Historian:   None - Patient Only    Review of External Notes:   I reviewed patient's recent admission, presented to the ER on October 31, 2023 with hearing problem, balance issues.  Patient was admitted to the hospital neurology evaluate the patient.  MRI with and without contrast was done.  Patient was discharged home yesterday.    Medications:    Atorvastatin  Cyclobenaprine  Diazepam   Donezepil  Folic acid  Guanfacine   Levothyroxine  Lisinopril   Pregabalin  Propafenone  Venlafaxine  Warfarin     Past Medical History:    Alcohol dependence  Anemia  Atrial fibrillation  BPH  Chronic low back pain  Depressive disorder   Thoracic aortic dissection  Hyperlipidemia  Hypothyroidism  Lumbar radiculopathy  Renal insufficiency     Past Surgical History:    Aortic valve replacement  Apply wound vac  Right cataract  Cholecystectomy  Colonoscopy  Decompression lumbar one level  Elbow surgery  Exploration of spine and removal of hardware  Fusion cervical anterior two levels   I&D spine  Tonsillectomy  [place] teeth removal      Physical Exam   Patient Vitals for the past 24 hrs:   BP Temp Temp src Pulse Resp SpO2   11/03/23 2230 139/86 -- -- 68 18 96 %   11/03/23 1900 108/79 -- -- -- -- 99 %   11/03/23 1700 108/78 -- -- 59 -- 97 %   11/03/23 1635 123/75 -- -- -- -- 92 %    11/03/23 1600 117/81 -- -- 62 -- 93 %   11/03/23 1524 (!) 147/89 97  F (36.1  C) Temporal 75 16 98 %        Physical Exam  Vitals reviewed.   Constitutional:       General: He is not in acute distress.     Appearance: He is not ill-appearing.   HENT:      Head: Normocephalic and atraumatic.   Eyes:      Extraocular Movements: Extraocular movements intact.   Cardiovascular:      Rate and Rhythm: Normal rate and regular rhythm.   Pulmonary:      Effort: Pulmonary effort is normal. No respiratory distress.      Breath sounds: Normal breath sounds. No wheezing.   Abdominal:      Palpations: Abdomen is soft.      Tenderness: There is no abdominal tenderness. There is no guarding.   Musculoskeletal:      Cervical back: Normal range of motion.   Skin:     General: Skin is warm and dry.   Neurological:      Mental Status: He is alert and oriented to person, place, and time.      GCS: GCS eye subscore is 4. GCS verbal subscore is 5. GCS motor subscore is 6.      Comments: MENTAL STATUS: A/O x 4, speech is fluent and spontaneous.  CRANIAL NERVES: CN II: No visual field deficits. Pupils are reactive to light. III, IV, VI: Extraocular muscles are intact. V: Facial sensation is equal bilaterally. VII: Eyebrow raise and smile are equal bilaterally. IX and X: Palate elevation is equal. XI: Shoulder shrug is equal. XII: Tongue protrusion without deviation.  SENSORY: Sensation is intact to light touch.   MOTOR: No Pronator drift. No atrophy. Normal muscle tone. 5/5 MS in the UE and LE bilat.  CEREBELLAR: Intact finger-to-nose, heel-to-knee-to-shin   Psychiatric:         Behavior: Behavior normal.           Emergency Department Course   ECG  ECG results from 11/03/23   EKG 12-lead, tracing only     Value    Systolic Blood Pressure     Diastolic Blood Pressure     Ventricular Rate 63    Atrial Rate 63    CT Interval 172    QRS Duration 102        QTc 421    P Axis 59    R AXIS -43    T Axis 83    Interpretation ECG      Sinus  rhythm with sinus arrhythmia  Left axis deviation  Possible Anterolateral infarct (cited on or before 31-OCT-2023)  Abnormal ECG  When compared with ECG of 31-OCT-2023 20:05,  No significant change was found       *Note: Due to a large number of results and/or encounters for the requested time period, some results have not been displayed. A complete set of results can be found in Results Review.     Imaging:  MRA Neck (Carotids) wo & w Contrast   Final Result   Addendum (preliminary) 1 of 1   Addendum: 5 mm rounded structure adjacent to the left carotid terminus MRA    head series 100 image 88 is favored to represent artifact (less likely    aneurysm) given the normal CT angiography findings.      Final   IMPRESSION:   HEAD MRV:    1.  No dural venous sinus thrombosis or significant stenosis.   2.  Prominent venous structure is seen extending from the left transverse sinus to the straight sinus. This is favored to represent prominent draining vein.      HEAD MRA:    1.  Focal short segment severe stenosis of the P2 segment left posterior cerebral artery, unchanged.   2.  No high flow vascular malformation.      NECK MRA:   1.  Normal neck MRA.               MRV Brain wo & w Contrast   Final Result   Addendum (preliminary) 1 of 1   Addendum: 5 mm rounded structure adjacent to the left carotid terminus MRA    head series 100 image 88 is favored to represent artifact (less likely    aneurysm) given the normal CT angiography findings.      Final   IMPRESSION:   HEAD MRV:    1.  No dural venous sinus thrombosis or significant stenosis.   2.  Prominent venous structure is seen extending from the left transverse sinus to the straight sinus. This is favored to represent prominent draining vein.      HEAD MRA:    1.  Focal short segment severe stenosis of the P2 segment left posterior cerebral artery, unchanged.   2.  No high flow vascular malformation.      NECK MRA:   1.  Normal neck MRA.               MRA Brain ([place]) wo Contrast   Final Result   Addendum (preliminary) 1 of 1   Addendum: 5 mm rounded structure adjacent to the left carotid terminus MRA    head series 100 image 88 is favored to represent artifact (less likely    aneurysm) given the normal CT angiography findings.      Final   IMPRESSION:   HEAD MRV:    1.  No dural venous sinus thrombosis or significant stenosis.   2.  Prominent venous structure is seen extending from the left transverse sinus to the straight sinus. This is favored to represent prominent draining vein.      HEAD MRA:    1.  Focal short segment severe stenosis of the P2 segment left posterior cerebral artery, unchanged.   2.  No high flow vascular malformation.      NECK MRA:   1.  Normal neck MRA.               MR Brain w/o & w Contrast   Final Result   IMPRESSION:   1.  No acute intracranial process.   2.  Expected evolution of the punctate infarct in the right posterior temporal lobe white matter.   3.  Stable chronic changes as detailed above.      CTA Head Neck with Contrast   Final Result   IMPRESSION:   HEAD CTA:   1.  No proximal large vessel occlusion or high-grade intracranial   stenosis.   2.  Prominent vein over the left tentorium has a density similar to   that of adjacent arteries, greater than adjacent veins, and while not   specific raises the possibility of an underlying vascular lesion such   as a fistula or draining AVM. Recommend follow-up brain MRA to   evaluate for high flow venous structures.      NECK CTA:   1.  Mild carotid artery atherosclerosis.    2.  No dissection or hemodynamically significant narrowing in the neck   by NASCET criteria.      Findings were discussed with Dr. SHASHI SHAH via telephone at 1602   hours on 11/3/2023.      TACHO DAVIS MD            SYSTEM ID:  [id number]      CT Head w/o Contrast   Final Result   IMPRESSION:   1.  No acute hemorrhage or infarct.   2.  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      TACHO DAVIS MD            SYSTEM ID:  [id number]        Laboratory:  Labs Ordered and Resulted from Time of ED Arrival to Time of ED Departure   BASIC METABOLIC PANEL - Abnormal       Result Value    Sodium 142      Potassium 4.7      Chloride 104      Carbon Dioxide (CO2) 27      Anion Gap 11      Urea Nitrogen 14.5      Creatinine 1.00      GFR Estimate 83      Calcium 9.4      Glucose 101 (*)    INR - Abnormal    INR 3.66 (*)    PARTIAL THROMBOPLASTIN TIME - Abnormal    aPTT 61 (*)    TROPONIN T, HIGH SENSITIVITY - Abnormal    Troponin T, High Sensitivity 25 (*)    GLUCOSE BY METER - Abnormal    GLUCOSE BY METER POCT 114 (*)    TROPONIN T, HIGH SENSITIVITY - Abnormal    Troponin T, High Sensitivity 23 (*)    CBC WITH PLATELETS AND DIFFERENTIAL    WBC Count 6.3      RBC Count 5.16      Hemoglobin 17.0      Hematocrit 49.6      MCV 96      MCH 32.9      MCHC 34.3      RDW 13.1      Platelet Count 231      % Neutrophils 54      % Lymphocytes 31      % Monocytes 11      % Eosinophils 3      % Basophils 1      % Immature Granulocytes 0      NRBCs per 100 WBC 0      Absolute Neutrophils 3.4      Absolute Lymphocytes 2.0      Absolute Monocytes 0.7      Absolute Eosinophils 0.2      Absolute Basophils 0.1      Absolute Immature Granulocytes 0.0      Absolute NRBCs 0.0     GLUCOSE MONITOR NURSING POCT     Emergency Department Course & Assessments:       Interventions:  Medications   sodium chloride (PF) 0.9% PF flush 100 mL (80 mLs Intravenous $Given 11/3/23 1531)   iopamidol (ISOVUE-370) solution 500 mL (67 mLs Intravenous $Given 11/3/23 1531)   meclizine (ANTIVERT) tablet 25 mg (25 mg Oral $Given 11/3/23 1658)   gadobutrol (GADAVIST) injection 10 mL (10 mLs Intravenous $Given 11/3/23 1727)   sodium chloride (PF) 0.9% PF flush 60 mL (60 mLs Intravenous $Given 11/3/23 1728)     Assessments:  1350 I entered the patient's room and obtained history.  1854 I rechecked the patient. Dizziness has  improved.    Independent Interpretation (X-rays, CTs, rhythm strip):      Consultations/Discussion of Management or Tests:  ED Course as of 23 2319      1537 Discussed with Claudia Hernandez. She is familiar with pt. Sx likely peripheral. No TNK    1602 Radiology, nothing acute. Vein left temporium AVM draining or fistual. ? Seen in .     Follow brain MRA.    1603 D/w Claudia with stroke neuro. states that symptoms are likely related to peripheral vertigo.  Recommends symptomatic management.  I discussed with her radiology findings on the CTA.  I discussed with her my plan to order an MRA.  She states that since the MRI has been ordered she will place an MRI with and without contrast.      Patient reevaluated and updated on results.       Social Determinants of Health affecting care:   None    Disposition:  The patient was discharged to home.     Impression & Plan    CMS Diagnoses: None    Medical Decision Makin-year-old male presenting today with dizziness.  He was just discharged yesterday for similar symptoms.  States that he has had dizziness, hearing loss to the left ear.  States he had the same symptoms when he presented to the ER earlier this week.  His MRI studies showed an old infarcts.  I discussed the case with neurology initially code stroke was called in triage.  Neurology is familiar with the patient.  States that they suspected that his symptoms were peripheral.  CT CTA was performed.  There was some abnormality seen in the CTA of the head per radiology and recommended MRA.  MRA was ordered.  I discussed these findings with neurology.  They still believe that the patient's symptoms are peripheral, I discussed my plans for an MRA of the brain.  They state that since patient is getting the MRI they will add additional MRIs.  I discussed with the patient the plan of care.  MRIs were performed.  Patient was given meclizine.  His dizziness resolved.  MRI is as noted above.   I discussed the results with the patient.  Discussed care instructions return precautions.  Patient instructed follow-up primary care doctor and neurology.  Discussed with them return precautions including any current of the dizziness, lightheadedness, numbness, tingling or any concerning symptoms.    Diagnosis:    ICD-10-CM    1. Dizziness  R42            Discharge Medications:  Discharge Medication List as of 11/3/2023 10:52 PM        START taking these medications    Details   meclizine (ANTIVERT) 25 MG tablet Take 1 tablet (25 mg) by mouth 3 times daily as needed for dizziness, Disp-21 tablet, R-0, Local Print           Scribe Disclosure:  IMiguel Angel Hired, am serving as a scribe at 4:59 PM on 11/3/2023 to document services personally performed by Priyanka Ardon DO based on my observations and the provider's statements to me.   11/3/2023   Priyanka Ardon DO Doan, Tiffani, DO  11/03/23 4281

## 2023-11-03 NOTE — PROGRESS NOTES
Opened a 2nd encounter in error.  See 11/6/23 encounter.    Angely Smalls RN  Anticoagulation Clinic

## 2023-11-04 NOTE — DISCHARGE INSTRUCTIONS
Take medications as directed    Follow-up with neurology and your primary care doctor    Return to the ER for any worsening or concerning symptoms.

## 2023-11-04 NOTE — ED NOTES
Pt walked independently down ED hallway and back to room. No assistance needed. Vital signs stable upon return to room. Denies imbalance or dizziness.

## 2023-11-06 ENCOUNTER — OFFICE VISIT (OUTPATIENT)
Dept: AUDIOLOGY | Facility: CLINIC | Age: 68
End: 2023-11-06
Attending: PHYSICIAN ASSISTANT
Payer: COMMERCIAL

## 2023-11-06 ENCOUNTER — ANTICOAGULATION THERAPY VISIT (OUTPATIENT)
Dept: ANTICOAGULATION | Facility: CLINIC | Age: 68
End: 2023-11-06

## 2023-11-06 DIAGNOSIS — H93.13 TINNITUS OF BOTH EARS: ICD-10-CM

## 2023-11-06 DIAGNOSIS — H91.92 HEARING LOSS OF LEFT EAR, UNSPECIFIED HEARING LOSS TYPE: ICD-10-CM

## 2023-11-06 DIAGNOSIS — H90.3 SENSORINEURAL HEARING LOSS, ASYMMETRICAL: Primary | ICD-10-CM

## 2023-11-06 DIAGNOSIS — I48.0 PAF (PAROXYSMAL ATRIAL FIBRILLATION) (H): Primary | ICD-10-CM

## 2023-11-06 DIAGNOSIS — Z95.2 AORTIC VALVE PROSTHESIS PRESENT: ICD-10-CM

## 2023-11-06 LAB
ATRIAL RATE - MUSE: 63 BPM
DIASTOLIC BLOOD PRESSURE - MUSE: NORMAL MMHG
INTERPRETATION ECG - MUSE: NORMAL
P AXIS - MUSE: 59 DEGREES
PR INTERVAL - MUSE: 172 MS
QRS DURATION - MUSE: 102 MS
QT - MUSE: 412 MS
QTC - MUSE: 421 MS
R AXIS - MUSE: -43 DEGREES
SYSTOLIC BLOOD PRESSURE - MUSE: NORMAL MMHG
T AXIS - MUSE: 83 DEGREES
VENTRICULAR RATE- MUSE: 63 BPM

## 2023-11-06 PROCEDURE — 92567 TYMPANOMETRY: CPT | Performed by: AUDIOLOGIST

## 2023-11-06 PROCEDURE — 92565 STENGER TEST PURE TONE: CPT | Performed by: AUDIOLOGIST

## 2023-11-06 PROCEDURE — 92557 COMPREHENSIVE HEARING TEST: CPT | Performed by: AUDIOLOGIST

## 2023-11-06 NOTE — PROGRESS NOTES
"ANTICOAGULATION  MANAGEMENT: Discharge Review    Todd S Aschoff chart reviewed for anticoagulation continuity of care    Emergency room visit on 11/3/23 for dizziness.    Discharge disposition: Home    Results:    Recent labs: (last 7 days)     10/31/23  1749 11/01/23  0809 11/01/23  1601 11/02/23  0757 11/03/23  1510 11/03/23  1526   INR 2.70* 3.02* 3.09* 4.04* 4.6* 3.66*     Anticoagulation inpatient management:     not applicable     Anticoagulation discharge instructions:     Warfarin dosing: home regimen continued   Bridging: No   INR goal change: No      Medication changes affecting anticoagulation: No    Additional factors affecting anticoagulation: Yes: POC INR was supratherapeutic in clinic but venous INR at ER was only slightly supratherapeutic.     PLANIN     Recommend to check INR on 11/10/23    Left a detailed message for Nicko    Anticoagulation Calendar updated    Angely Smalls RN      ANTICOAGULATION MANAGEMENT     Todd S Aschoff 67 year old male is on warfarin with supratherapeutic INR result. (Goal INR 2.5-3.5)    Recent labs: (last 7 days)     11/03/23  1526   INR 3.66*       ASSESSMENT     Source(s): Chart Review and Patient/Caregiver Call     Warfarin doses taken: Warfarin taken as instructed  Diet: No new diet changes identified  Medication/supplement changes: None noted  New illness, injury, or hospitalization: Yes: ER on 11/3/23 for dizziness  Hospitalized 10/31-11/2/23 for right temporal lobe infarction  Per chart review, infarcts could be \"old\"  Patient reports he had a hearing test in [place] today, he reports the he is deaf in the left ear, worked around loud equipment for \"years\".  Signs or symptoms of bleeding or clotting: No  Previous result: Therapeutic last 2(+) visits  Additional findings: None       PLAN     Recommended plan for no diet, medication or health factor changes affecting INR     Dosing Instructions: Continue your current warfarin dose with next INR in 10 days   "     Summary  As of 11/6/2023      Full warfarin instructions:  7.5 mg every Tue, Fri; 5 mg all other days   Next INR check:  11/13/2023               Telephone call with Nicko who verbalizes understanding and agrees to plan    Lab visit scheduled    Education provided:   Contact [phone]  with any changes, questions or concerns.     Plan made per [facility] anticoagulation protocol    Angely Smalls RN  Anticoagulation Clinic  11/6/2023    _______________________________________________________________________     Anticoagulation Episode Summary       Current INR goal:  2.5-3.5   TTR:  73.2% (12 mo)   Target end date:  Indefinite   Send INR reminders to:  AIDA GUERRERO    Indications    PAF (paroxysmal atrial fibrillation) (H) [I48.0]  Mechanical AV Replacement 1992 -- on Warfarin  [Z95.2]             Comments:               Anticoagulation Care Providers       Provider Role Specialty Phone number    Obdulio Ingram MD Referring Internal Medicine [phone]    Herve Morgan MD Referring Internal Medicine - Pediatrics [phone]

## 2023-11-13 ENCOUNTER — LAB (OUTPATIENT)
Dept: LAB | Facility: CLINIC | Age: 68
End: 2023-11-13
Payer: COMMERCIAL

## 2023-11-13 ENCOUNTER — ANTICOAGULATION THERAPY VISIT (OUTPATIENT)
Dept: ANTICOAGULATION | Facility: CLINIC | Age: 68
End: 2023-11-13

## 2023-11-13 DIAGNOSIS — Z95.2 AORTIC VALVE PROSTHESIS PRESENT: ICD-10-CM

## 2023-11-13 DIAGNOSIS — I48.0 PAF (PAROXYSMAL ATRIAL FIBRILLATION) (H): Primary | ICD-10-CM

## 2023-11-13 DIAGNOSIS — I48.0 PAF (PAROXYSMAL ATRIAL FIBRILLATION) (H): ICD-10-CM

## 2023-11-13 LAB — INR BLD: 4.4 (ref 0.9–1.1)

## 2023-11-13 PROCEDURE — 85610 PROTHROMBIN TIME: CPT

## 2023-11-13 PROCEDURE — 36416 COLLJ CAPILLARY BLOOD SPEC: CPT

## 2023-11-13 NOTE — PROGRESS NOTES
"ANTICOAGULATION MANAGEMENT     Todd S Aschoff 67 year old male is on warfarin with supratherapeutic INR result. (Goal INR 2.5-3.5)    Recent labs: (last 7 days)     11/13/23  1434   INR 4.4*       ASSESSMENT     Source(s): Chart Review and Patient/Caregiver Call     Warfarin doses taken: Warfarin taken as instructed  Diet: No new diet changes identified  Medication/supplement changes: None noted--using over the counter Ernesto's vaporub  New illness, injury, or hospitalization: Yes: currently has \"bronchitis and sinusitis\" symptoms, patient reports he has not seen a doctor because it takes too long to get in.  Signs or symptoms of bleeding or clotting: No  Previous result: Supratherapeutic; however, it was only slightly supratherapeutic (3.6) at ER on 11/3/23.  Additional findings: if next INR is supratherapeutic and patient is not ill, we can reduce warfarin maintenance dose.       PLAN     Recommended plan for temporary change(s) affecting INR     Dosing Instructions: partial hold then continue your current warfarin dose with next INR in 11 days       Summary  As of 11/13/2023      Full warfarin instructions:  11/13: 2.5 mg; Otherwise 7.5 mg every Tue, Fri; 5 mg all other days   Next INR check:  11/24/2023               Telephone call with Nicko who verbalizes understanding and agrees to plan and who agrees to plan and repeated back plan correctly    Lab visit scheduled    Education provided:   Contact [phone]  with any changes, questions or concerns.     Plan made per [facility] anticoagulation protocol    Angely Smalls RN  Anticoagulation Clinic  11/13/2023    _______________________________________________________________________     Anticoagulation Episode Summary       Current INR goal:  2.5-3.5   TTR:  72.6% (1 y)   Target end date:  Indefinite   Send INR reminders to:  AIDA GUERRERO    Indications    PAF (paroxysmal atrial fibrillation) (H) [I48.0]  Mechanical AV Replacement 1992 -- on Warfarin  [Z95.2]        "      Comments:               Anticoagulation Care Providers       Provider Role Specialty Phone number    Obdulio Ingram MD Referring Internal Medicine [phone]    Herve Morgan MD Referring Internal Medicine - Pediatrics [phone]

## 2023-11-15 ENCOUNTER — APPOINTMENT (OUTPATIENT)
Dept: URGENT CARE | Facility: URGENT CARE | Age: 68
End: 2023-11-15
Payer: COMMERCIAL

## 2023-11-16 ENCOUNTER — OFFICE VISIT (OUTPATIENT)
Dept: OTOLARYNGOLOGY | Facility: CLINIC | Age: 68
End: 2023-11-16
Payer: COMMERCIAL

## 2023-11-16 VITALS
HEART RATE: 85 BPM | OXYGEN SATURATION: 95 % | BODY MASS INDEX: 43.54 KG/M2 | SYSTOLIC BLOOD PRESSURE: 123 MMHG | DIASTOLIC BLOOD PRESSURE: 81 MMHG | HEIGHT: 71 IN | TEMPERATURE: 96.9 F | WEIGHT: 311 LBS

## 2023-11-16 DIAGNOSIS — H90.3 ASYMMETRICAL SENSORINEURAL HEARING LOSS: Primary | ICD-10-CM

## 2023-11-16 PROCEDURE — 99203 OFFICE O/P NEW LOW 30 MIN: CPT | Performed by: PHYSICIAN ASSISTANT

## 2023-11-16 RX ORDER — PREDNISONE 10 MG/1
10 TABLET ORAL DAILY
Status: CANCELLED | OUTPATIENT
Start: 2023-11-16

## 2023-11-16 RX ORDER — PREDNISONE 20 MG/1
TABLET ORAL
Qty: 38 TABLET | Refills: 0 | Status: SHIPPED | OUTPATIENT
Start: 2023-11-16 | End: 2023-12-01

## 2023-11-16 ASSESSMENT — PAIN SCALES - GENERAL: PAINLEVEL: EXTREME PAIN (9)

## 2023-11-16 NOTE — PROGRESS NOTES
Otolaryngology Clinic  November 16, 2023    Chief Complaint:   Sudden hearing loss       History of Present Illness:   Todd S Aschoff is a 67 year old male who has a history of A-fib, BPH, HLD, hypothyroidism, HTN, renal insufficiency and thoracic aortic dissection presents to clinic for evaluation of sudden hearing loss. He was seen in the ED on 10/31/2023 for dizziness and hearing loss. His dizziness resolved in the ED with Meclizine. He underwent multiple imaging studies with consultation by neurology suggesting symptoms were peripheral. He completed an audiogram on 11/6/2023 which was reviewed by Dr. Oliveira. At that time it was determined he was not a candidate for intratympanic injections. He reports that dizziness is resolved.  He continues to have increased hearing loss left ear. He wears hearing aids on both sides and feels his right ear is his better ear.  He reports not being able to hear at all out of his left ear.  Left ear feels plugged.  He notices an intermittent popping on the left side.  He has a history of noise exposure.  Denies pain drainage or history of ear surgery.  He has not had any medication for this point.    Past Medical History:  Past Medical History:   Diagnosis Date    Advanced directives, counseling/discussion 1/6/2012    Discussed Advance Directive planning with patient; information given to patient to review.    Alcohol dependence (H)     Alcohol dependence in remission (H) 8/2/2018    Allergy, unspecified not elsewhere classified     seasonal    Anemia, unspecified type 1/22/2021    Aortic valve prosthesis present 2/8/2021    Atrial fibrillation (H)     Benign prostatic hyperplasia 2/8/2021    Bilateral thoracic back pain 11/16/2016    BPH (benign prostatic hypertrophy)     Chronic atrial fibrillation (H) 8/7/2002    Chronic infection     low back wound incision not healing     Chronic low back pain 8/19/2011    Chronic pain     lower back and right leg and left leg    Depressive  disorder 6/9/2010    Depression  NOS    Dissection of aorta, thoracic (H) 1992    [facility] Jose F aotic valve + arch graft 1992    Elevated prostate specific antigen (PSA) 1/22/2020    Family history of prostate cancer 1/22/2020    Generalized muscle weakness 1/22/2021    Headache(784.0)     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 Provider to review    Leg wound, left 1/9/2021    Long term current use of anticoagulant therapy 8/7/2002    LW Modifier:  Coumadin, since mechanical aortic valve LW Onset:  1992 ; Anticoagulant Rx Long Term Problem list name updated by automated process. Provider to review    Low back pain     Lumbar radiculopathy 4/3/2013    Lumbar surgical wound fluid collection 5/23/2013    Major depression     Memory difficulties 1/16/2015    Mixed hyperlipidemia     Morbid obesity (H) 4/20/2010    LW Modifier:  BMI 41.3 (Apr 2010) ; Obesity Morbid    Numbness and tingling     right leg post surg rightt hip/ also left leg since surg    OA (osteoarthritis)     hips    OAB (overactive bladder) 5/11/2015    Obesity, unspecified     Persons encountering health services in other specified circumstances 4/3/2012    Formatting of this note might be different from the original. EMERGENCY CARE PLAN Presenting Problem Signs and Symptoms Treatment Plan   Questions or conerns during clinic hours    I will call the clinic directly    Questions or conerns outside clinic hours    I will call the 24 hour nurse line at [phone]   Patient needs to schedule an appointment    I will call the 24 hour scheduling team at    Polypharmacy 1/22/2021    Prostate infection     Radiculitis 4/20/2010    LW Modifier:  Right foot, s/p R AMANDA LW Onset:  2002 ; Neuralgia    Recurrent  major depressive episodes, in full remission (H24) 10/30/2012    Renal insufficiency 1/22/2021    Rotator cuff tear 1/26/2015    Rotator cuff tear, right    S/P lumbar fusion 4/5/2018    S/P 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 Jose F's valve    APPLY WOUND VAC Left 1/26/2021    Procedure: Placement of negative pressure wound therapy 2,400 squared centimeters  ;  Surgeon: Lazaro Plascencia MD;  Location: [facility] OR    CATARACT IOL, RT/LT      rt eye only    CHOLECYSTECTOMY, LAPOROSCOPIC  8/10    CLOSE SECONDARY WOUND LOWER EXTREMITY Left 2/3/2021    Procedure:  Split Thickness Skin Graft to Leg of 18 square centimeters  Intermediate Closure of Leg of 8cm   ;  Surgeon: Lazaro Plascencia MD;  Location: [facility] OR    COLONOSCOPY N/A 1/15/2015    Procedure: COLONOSCOPY;  Surgeon: Johnson Kothari MD;  Location:  GI    DECOMPRESSION LUMBAR ONE LEVEL  4/3/2013    Procedure: DECOMPRESSION LUMBAR ONE LEVEL;  Open Decompression L3-4 bilateral;  Surgeon: Travis Coffey MD;  Location: [facility] OR    DECOMPRESSION, FUSION CERVICAL ANTERIOR ONE LEVEL, COMBINED  3/23/2012    Procedure:COMBINED DECOMPRESSION, FUSION CERVICAL ANTERIOR ONE LEVEL; Anterior Cervical Decompression and Fusion C4-6; Surgeon:TRAVIS COFFEY; Location:[facility] OR    ELBOW SURGERY      EXPLORE SPINE, REMOVE HARDWARE, COMBINED  5/23/2013    Procedure: COMBINED EXPLORE SPINE, REMOVE HARDWARE;  Exploration Lumbar Wound for fluid collection;  Surgeon: Travis Coffey MD;   Location: [facility] OR    FUSION CERVICAL ANTERIOR TWO LEVELS  3/26/2012    Procedure:FUSION CERVICAL ANTERIOR TWO LEVELS; Anterior Cervical Fusion C4-6, Anterior Cervical  Hematoma Evacuation; Surgeon:TRAVIS COFFEY; Location:[facility] OR    IR LUMBAR EPIDURAL STEROID INJECTION  3/28/2003    IRRIGATION AND DEBRIDEMENT LOWER EXTREMITY, COMBINED Left 1/10/2021    Procedure: 1.  Irrigation and excisional debridement to muscle left distal medial calf chronic wounds total area measuring 9 cm x 4 cm 2.  Irrigation and excisional debridement to fascia left medial calf chronic hematoma measuring 29 x 6.7 x 4.2 cm 3.  Primary complex wound closure left medial distal calf 9 cm in length;  Surgeon: Rod Kemp MD;  Location: [facility] OR    IRRIGATION AND DEBRIDEMENT LOWER EXTREMITY, COMBINED Left 1/26/2021    Procedure: Evacuation of hematoma debridement of skin, subcutaneous tissue and muscle 2,400 squared centimeters, placement of negative pressure wound therapy 2,400 squared centimeters;  Surgeon: Lazaro Plascencia MD;  Location: [facility] OR    IRRIGATION AND DEBRIDEMENT SPINE, CLOSE WOUND, COMBINED  3/26/2012    Procedure:COMBINED IRRIGATION AND DEBRIDEMENT SPINE, CLOSE WOUND; Surgeon:TRAVIS COFFEY; Location:[facility] OR    OTHER SURGICAL HISTORY      DC UNLISTED ORBIT    OTHER SURGICAL HISTORY      DC UNLISTED SPINE    OTHER SURGICAL HISTORY      DC UNLISTED NECK/THORAX    OTHER SURGICAL HISTORY      (IA) DC TOTAL HIP ARTHROPLASTY    OTHER SURGICAL HISTORY      (IA) DC NEE SCOPE MED W LAT MENISCECT WWO DEBRIBE/SHAVE ANY CO    removal of cyst of back   2.5week    TONSILLECTOMY      wisdom teeth[      [facility] NONSPECIFIC PROCEDURE  1992    repair of TAA with graft    [facility] TOTAL HIP ARTHROPLASTY  2010    Left AMANDA    [facility] TOTAL HIP ARTHROPLASTY  2002    R hip replacement comp's by nerve injury with pain down into R leg, nadya below knee       Medications:  Current Outpatient Medications   Medication Sig Dispense Refill    acetaminophen (TYLENOL) 500  "MG tablet Take 1,000 mg by mouth 2 times daily      atorvastatin (LIPITOR) 40 MG tablet Take 1 tablet (40 mg) by mouth every evening 30 tablet 0    azelastine-fluticasone (DYMISTA) 137-50 MCG/ACT nasal spray Spray 1 spray into both nostrils daily      cyclobenzaprine (FLEXERIL) 10 MG tablet TAKE ONE TABLET BY MOUTH THREE TIMES DAILY AS NEEDED FOR MUSCLE SPASM. 270 tablet 3    diazepam (VALIUM) 5 MG tablet Take 1 tablet (5 mg) by mouth nightly as needed for anxiety 30 tablet 0    donepezil (ARICEPT) 10 MG tablet Take 1 tablet (10 mg) by mouth At Bedtime 90 tablet 3    folic acid 5 MG CAPS Take 5 mg by mouth daily      guanFACINE (TENEX) 1 MG tablet Take 1 tablet (1 mg) by mouth At Bedtime 90 tablet 1    levothyroxine (SYNTHROID/LEVOTHROID) 175 MCG tablet Take 1 tablet (175 mcg) by mouth daily 90 tablet 3    lisinopril (ZESTRIL) 10 MG tablet Take 1 tablet (10 mg) by mouth daily 90 tablet 3    pregabalin (LYRICA) 100 MG capsule Take 1 capsule (100 mg) by mouth 3 times daily Do not take if sleepy/sedated. 270 capsule 3    propafenone (RYTHMOL) 150 MG TABS tablet Take 1 tablet (150 mg) by mouth every 8 hours 270 tablet 4    venlafaxine (EFFEXOR XR) 75 MG 24 hr capsule Take 3 capsules (225 mg) by mouth daily 270 capsule 3    warfarin ANTICOAGULANT (COUMADIN) 5 MG tablet Take by mouth daily 7.5mg Tuesday and Friday and 5mg ROW         Allergies:  Allergies   Allergen Reactions    Blood Transfusion Related (Informational Only) Other (See Comments)     Patient has a history of a clinically significant antibody against RBC antigens.  A delay in compatible RBCs may occur.      Ciprofloxacin      \"Went nuts\"    Gabapentin      Severe behavioral disturbances        Social History:  Social History     Tobacco Use    Smoking status: Never    Smokeless tobacco: Never   Vaping Use    Vaping Use: Never used   Substance Use Topics    Alcohol use: No     Comment: Stopped drinking alcohol ~2009    Drug use: No       ROS: 10 point ROS " "neg other than the symptoms noted above in the HPI.    Physical Exam:    /81   Pulse 85   Temp 96.9  F (36.1  C)   Ht 1.803 m (5' 11\")   Wt 141.1 kg (311 lb)   SpO2 95%   BMI 43.38 kg/m       Constitutional:  The patient was unaccompanied, well-groomed, and in no acute distress.     Skin: Normal:  warm and pink without rash    Neurologic: Alert and oriented x 3.  CN's III-XII within normal limits except for right lateral rectus palsy.  Voice normal.   Psychiatric: The patient's affect was calm, cooperative, and appropriate.    Communication:  Normal; communicates verbally, normal voice quality.   Respiratory: Breathing comfortably without stridor or exertion of accessory muscles.   Head/Face:  Normocephalic and atraumatic.  No lesions or scars.    Eyes: Wearing glasses. Extraocular movement intact.   Ears: Pinnae and tragus non-tender.  EAC's and TM's were clear.    Nose: No external deformity, no anterior drainage   Oral Cavity: Normal tongue, moist, adequate dentition   Neck: Supple with normal laryngeal and tracheal landmarks. Normal range of motion.      Audiogram: 11/06/2023 - data independently reviewed  Right ear: Mild sloping to a moderately severe SNHL   Left ear: Profound presumably SNHL   SRT right: 45 left: 90   WR right: 92% left: 0%   Acoustic Reflexes: Did not test  Tympanograms: type AS right, type A left      No prior audiogram for comparison    Assessment and Plan:  Asymmetric hearing loss   Discussed prognosis with 0% word rec on the left side however, patient is willing to try a course of prednisone. 10 day taper prescribed. He will follow up for audiogram in 2 weeks. [facility]    Patient will follow up for audiogram in 2 weeks    Shoshana Kathleen PA-C  Otolaryngology  Head & Neck Surgery  [phone]    Review of external notes as documented elsewhere in note  30 minutes spent by me on the date of the encounter doing chart " review, history and exam, documentation and further activities per the note

## 2023-11-16 NOTE — NURSING NOTE
"Chief Complaint   Patient presents with    RECHECK     Follow up -sensorineural hearing loss      Blood pressure 123/81, pulse 85, temperature 96.9  F (36.1  C), height 1.803 m (5' 11\"), weight 141.1 kg (311 lb), SpO2 95%.  Pradip Mera LPN    "

## 2023-11-16 NOTE — PATIENT INSTRUCTIONS
1. You were seen in the ENT Clinic today by BROOKE Tucker.  If you have any questions or concerns after your appointment, please call   - Option 1: ENT Clinic: [phone]   - Option 2: Sindy (Shoshana Kathleen's  Nurse): [phone]                     2.   Plan to return to clinic in 2 weeks to preferred location for hearing test    Sindy Rome LPN  [facility] - Otolaryngology

## 2023-11-16 NOTE — LETTER
11/16/2023       RE: Todd S Aschoff  [address] [facility] [address]     Dear Colleague,    Thank you for referring your patient, Todd S Aschoff, to the [facility] EAR NOSE AND THROAT CLINIC [place] at [facility]. Please see a copy of my visit note below.      Otolaryngology Clinic  November 16, 2023    Chief Complaint:   Sudden hearing loss       History of Present Illness:   Todd S Aschoff is a 67 year old male who has a history of A-fib, BPH, HLD, hypothyroidism, HTN, renal insufficiency and thoracic aortic dissection presents to clinic for evaluation of sudden hearing loss. He was seen in the ED on 10/31/2023 for dizziness and hearing loss. His dizziness resolved in the ED with Meclizine. He underwent multiple imaging studies with consultation by neurology suggesting symptoms were peripheral. He completed an audiogram on 11/6/2023 which was reviewed by Dr. Oliveira. At that time it was determined he was not a candidate for intratympanic injections. He reports that dizziness is resolved.  He continues to have increased hearing loss left ear. He wears hearing aids on both sides and feels his right ear is his better ear.  He reports not being able to hear at all out of his left ear.  Left ear feels plugged.  He notices an intermittent popping on the left side.  He has a history of noise exposure.  Denies pain drainage or history of ear surgery.  He has not had any medication for this point.    Past Medical History:  Past Medical History:   Diagnosis Date    Advanced directives, counseling/discussion 1/6/2012    Discussed Advance Directive planning with patient; information given to patient to review.    Alcohol dependence (H)     Alcohol dependence in remission (H) 8/2/2018    Allergy, unspecified not elsewhere classified     seasonal    Anemia, unspecified type 1/22/2021    Aortic valve prosthesis present 2/8/2021    Atrial fibrillation (H)     Benign  prostatic hyperplasia 2/8/2021    Bilateral thoracic back pain 11/16/2016    BPH (benign prostatic hypertrophy)     Chronic atrial fibrillation (H) 8/7/2002    Chronic infection     low back wound incision not healing     Chronic low back pain 8/19/2011    Chronic pain     lower back and right leg and left leg    Depressive disorder 6/9/2010    Depression  NOS    Dissection of aorta, thoracic (H) 1992    St Jose F aotic valve + arch graft 1992    Elevated prostate specific antigen (PSA) 1/22/2020    Family history of prostate cancer 1/22/2020    Generalized muscle weakness 1/22/2021    Headache(784.0)     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 Provider to review    Leg wound, left 1/9/2021    Long term current use of anticoagulant therapy 8/7/2002    LW Modifier:  Coumadin, since mechanical aortic valve LW Onset:  1992 ; Anticoagulant Rx Long Term Problem list name updated by automated process. Provider to review    Low back pain     Lumbar radiculopathy 4/3/2013    Lumbar surgical wound fluid collection 5/23/2013    Major depression     Memory difficulties 1/16/2015    Mixed hyperlipidemia     Morbid obesity (H) 4/20/2010    LW Modifier:  BMI 41.3 (Apr 2010) ; Obesity Morbid    Numbness and tingling     right leg post surg rightt hip/ also left leg since surg    OA (osteoarthritis)     hips    OAB (overactive bladder) 5/11/2015    Obesity, unspecified     Persons encountering health services in other specified circumstances 4/3/2012    Formatting of this note might be different from the original. EMERGENCY CARE PLAN Presenting Problem Signs and Symptoms Treatment Plan   Questions or conerns during clinic hours    I will call the clinic directly    Questions or  alexa outside clinic hours    I will call the 24 hour nurse line at [phone]   Patient needs to schedule an appointment    I will call the 24 hour scheduling team at    Polypharmacy 1/22/2021    Prostate infection     Radiculitis 4/20/2010    LW Modifier:  Right foot, s/p R AMANDA LW Onset:  2002 ; Neuralgia    Recurrent major depressive episodes, in full remission (H24) 10/30/2012    Renal insufficiency 1/22/2021    Rotator cuff tear 1/26/2015    Rotator cuff tear, right    S/P lumbar fusion 4/5/2018    S/P St. 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 Jose F's valve    APPLY WOUND VAC Left 1/26/2021    Procedure: Placement of negative pressure wound therapy 2,400 squared centimeters  ;  Surgeon: Lazaro Plascencia MD;  Location: [facility] OR    CATARACT IOL, RT/LT      rt eye only    CHOLECYSTECTOMY, LAPOROSCOPIC  8/10    CLOSE SECONDARY WOUND LOWER EXTREMITY Left 2/3/2021    Procedure:  Split Thickness Skin Graft to Leg of 18 square centimeters  Intermediate Closure of Leg of 8cm   ;  Surgeon: Lazaro Plascencia MD;  Location: [facility] OR    COLONOSCOPY N/A 1/15/2015    Procedure: COLONOSCOPY;  Surgeon: Johnson Kothari MD;  Location: [facility] GI    DECOMPRESSION LUMBAR ONE LEVEL  4/3/2013    Procedure: DECOMPRESSION LUMBAR ONE LEVEL;  Open Decompression L3-4 bilateral;  Surgeon: Khalif Casas MD;  Location: [facility] OR    DECOMPRESSION, FUSION CERVICAL ANTERIOR ONE LEVEL, COMBINED  3/23/2012    Procedure:COMBINED DECOMPRESSION, FUSION CERVICAL ANTERIOR  ONE LEVEL; Anterior Cervical Decompression and Fusion C4-6; Surgeon:KHALIF COFFEY; Location:[facility] OR    ELBOW SURGERY      EXPLORE SPINE, REMOVE HARDWARE, COMBINED  5/23/2013    Procedure: COMBINED EXPLORE SPINE, REMOVE HARDWARE;  Exploration Lumbar Wound for fluid collection;  Surgeon: Khalif Coffey MD;  Location: [facility] OR    FUSION CERVICAL ANTERIOR TWO LEVELS  3/26/2012    Procedure:FUSION CERVICAL ANTERIOR TWO LEVELS; Anterior Cervical Fusion C4-6, Anterior Cervical  Hematoma Evacuation; Surgeon:KHALIF COFFEY; Location:[facility] OR    IR LUMBAR EPIDURAL STEROID INJECTION  3/28/2003    IRRIGATION AND DEBRIDEMENT LOWER EXTREMITY, COMBINED Left 1/10/2021    Procedure: 1.  Irrigation and excisional debridement to muscle left distal medial calf chronic wounds total area measuring 9 cm x 4 cm 2.  Irrigation and excisional debridement to fascia left medial calf chronic hematoma measuring 29 x 6.7 x 4.2 cm 3.  Primary complex wound closure left medial distal calf 9 cm in length;  Surgeon: Rod Kemp MD;  Location: [facility] OR    IRRIGATION AND DEBRIDEMENT LOWER EXTREMITY, COMBINED Left 1/26/2021    Procedure: Evacuation of hematoma debridement of skin, subcutaneous tissue and muscle 2,400 squared centimeters, placement of negative pressure wound therapy 2,400 squared centimeters;  Surgeon: Lazaro Plascencia MD;  Location: [facility] OR    IRRIGATION AND DEBRIDEMENT SPINE, CLOSE WOUND, COMBINED  3/26/2012    Procedure:COMBINED IRRIGATION AND DEBRIDEMENT SPINE, CLOSE WOUND; Surgeon:KHALIF COFFEY; Location:[facility] OR    OTHER SURGICAL HISTORY      [place] UNLISTED ORBIT    OTHER SURGICAL HISTORY      [place] UNLISTED SPINE    OTHER SURGICAL HISTORY      [place] UNLISTED NECK/THORAX    OTHER SURGICAL HISTORY      (IA) [place] TOTAL HIP ARTHROPLASTY    OTHER SURGICAL HISTORY      (IA) [place] NEE SCOPE MED W LAT MENISCECT WWO DEBRIBE/SHAVE ANY CO    removal of cyst of back   2.5week    TONSILLECTOMY      wisdom teeth[      ZZC NONSPECIFIC PROCEDURE  " 1992    repair of TAA with graft    [facility] TOTAL HIP ARTHROPLASTY  2010    Left AMANDA    [facility] TOTAL HIP ARTHROPLASTY  2002    R hip replacement comp's by nerve injury with pain down into R leg, nadya below knee       Medications:  Current Outpatient Medications   Medication Sig Dispense Refill    acetaminophen (TYLENOL) 500 MG tablet Take 1,000 mg by mouth 2 times daily      atorvastatin (LIPITOR) 40 MG tablet Take 1 tablet (40 mg) by mouth every evening 30 tablet 0    azelastine-fluticasone (DYMISTA) 137-50 MCG/ACT nasal spray Spray 1 spray into both nostrils daily      cyclobenzaprine (FLEXERIL) 10 MG tablet TAKE ONE TABLET BY MOUTH THREE TIMES DAILY AS NEEDED FOR MUSCLE SPASM. 270 tablet 3    diazepam (VALIUM) 5 MG tablet Take 1 tablet (5 mg) by mouth nightly as needed for anxiety 30 tablet 0    donepezil (ARICEPT) 10 MG tablet Take 1 tablet (10 mg) by mouth At Bedtime 90 tablet 3    folic acid 5 MG CAPS Take 5 mg by mouth daily      guanFACINE (TENEX) 1 MG tablet Take 1 tablet (1 mg) by mouth At Bedtime 90 tablet 1    levothyroxine (SYNTHROID/LEVOTHROID) 175 MCG tablet Take 1 tablet (175 mcg) by mouth daily 90 tablet 3    lisinopril (ZESTRIL) 10 MG tablet Take 1 tablet (10 mg) by mouth daily 90 tablet 3    pregabalin (LYRICA) 100 MG capsule Take 1 capsule (100 mg) by mouth 3 times daily Do not take if sleepy/sedated. 270 capsule 3    propafenone (RYTHMOL) 150 MG TABS tablet Take 1 tablet (150 mg) by mouth every 8 hours 270 tablet 4    venlafaxine (EFFEXOR XR) 75 MG 24 hr capsule Take 3 capsules (225 mg) by mouth daily 270 capsule 3    warfarin ANTICOAGULANT (COUMADIN) 5 MG tablet Take by mouth daily 7.5mg Tuesday and Friday and 5mg ROW         Allergies:  Allergies   Allergen Reactions    Blood Transfusion Related (Informational Only) Other (See Comments)     Patient has a history of a clinically significant antibody against RBC antigens.  A delay in compatible RBCs may occur.      Ciprofloxacin      \"Went nuts\"    " "Gabapentin      Severe behavioral disturbances        Social History:  Social History     Tobacco Use    Smoking status: Never    Smokeless tobacco: Never   Vaping Use    Vaping Use: Never used   Substance Use Topics    Alcohol use: No     Comment: Stopped drinking alcohol ~2009    Drug use: No       ROS: 10 point ROS neg other than the symptoms noted above in the HPI.    Physical Exam:    /81   Pulse 85   Temp 96.9  F (36.1  C)   Ht 1.803 m (5' 11\")   Wt 141.1 kg (311 lb)   SpO2 95%   BMI 43.38 kg/m       Constitutional:  The patient was unaccompanied, well-groomed, and in no acute distress.     Skin: Normal:  warm and pink without rash    Neurologic: Alert and oriented x 3.  CN's III-XII within normal limits except for right lateral rectus palsy.  Voice normal.   Psychiatric: The patient's affect was calm, cooperative, and appropriate.    Communication:  Normal; communicates verbally, normal voice quality.   Respiratory: Breathing comfortably without stridor or exertion of accessory muscles.   Head/Face:  Normocephalic and atraumatic.  No lesions or scars.    Eyes: Wearing glasses. Extraocular movement intact.   Ears: Pinnae and tragus non-tender.  EAC's and TM's were clear.    Nose: No external deformity, no anterior drainage   Oral Cavity: Normal tongue, moist, adequate dentition   Neck: Supple with normal laryngeal and tracheal landmarks. Normal range of motion.      Audiogram: 11/06/2023 - data independently reviewed  Right ear: Mild sloping to a moderately severe SNHL   Left ear: Profound presumably SNHL   SRT right: 45 left: 90   WR right: 92% left: 0%   Acoustic Reflexes: Did not test  Tympanograms: type AS right, type A left      No prior audiogram for comparison    Assessment and Plan:  Asymmetric hearing loss   Discussed prognosis with 0% word rec on the left side however, patient is willing to try a course of prednisone. 10 day taper prescribed. He will follow up for audiogram in 2 weeks. " [facility] completed    Patient will follow up for audiogram in 2 weeks    Shoshana Kathleen PA-C  Otolaryngology  Head & Neck Surgery  [phone]    Review of external notes as documented elsewhere in note  30 minutes spent by me on the date of the encounter doing chart review, history and exam, documentation and further activities per the note      Again, thank you for allowing me to participate in the care of your patient.      Sincerely,    Shoshana Kathleen PA-C

## 2023-11-24 ENCOUNTER — LAB (OUTPATIENT)
Dept: LAB | Facility: CLINIC | Age: 68
End: 2023-11-24
Payer: COMMERCIAL

## 2023-11-24 ENCOUNTER — ANTICOAGULATION THERAPY VISIT (OUTPATIENT)
Dept: ANTICOAGULATION | Facility: CLINIC | Age: 68
End: 2023-11-24

## 2023-11-24 DIAGNOSIS — I48.0 PAF (PAROXYSMAL ATRIAL FIBRILLATION) (H): ICD-10-CM

## 2023-11-24 DIAGNOSIS — I48.0 PAF (PAROXYSMAL ATRIAL FIBRILLATION) (H): Primary | ICD-10-CM

## 2023-11-24 DIAGNOSIS — Z95.2 AORTIC VALVE PROSTHESIS PRESENT: ICD-10-CM

## 2023-11-24 LAB — INR BLD: 3.4 (ref 0.9–1.1)

## 2023-11-24 PROCEDURE — 85610 PROTHROMBIN TIME: CPT

## 2023-11-24 PROCEDURE — 36416 COLLJ CAPILLARY BLOOD SPEC: CPT

## 2023-11-24 NOTE — PROGRESS NOTES
ANTICOAGULATION MANAGEMENT     Todd S Aschoff 67 year old male is on warfarin with therapeutic INR result. (Goal INR 2.5-3.5)    Recent labs: (last 7 days)     11/24/23  1058   INR 3.4*       ASSESSMENT     Source(s): Chart Review  Previous INR was Supratherapeutic  Medication, diet, health changes since last INR chart reviewed; none identified         PLAN     Recommended plan for no diet, medication or health factor changes affecting INR     Dosing Instructions: Continue your current warfarin dose with next INR in 2 weeks       Summary  As of 11/24/2023      Full warfarin instructions:  7.5 mg every Tue, Fri; 5 mg all other days   Next INR check:  12/8/2023               Detailed voice message left for Nicko with dosing instructions and follow up date.     Contact [phone]  to schedule and with any changes, questions or concerns.     Education provided:   Please call back if any changes to your diet, medications or how you've been taking warfarin    Plan made per [facility] anticoagulation protocol    Amanda Wheeler RN  Anticoagulation Clinic  11/24/2023    _______________________________________________________________________     Anticoagulation Episode Summary       Current INR goal:  2.5-3.5   TTR:  70.6% (1 y)   Target end date:  Indefinite   Send INR reminders to:  AIDA GUERRERO    Indications    PAF (paroxysmal atrial fibrillation) (H) [I48.0]  Mechanical AV Replacement 1992 -- on Warfarin  [Z95.2]             Comments:               Anticoagulation Care Providers       Provider Role Specialty Phone number    Obdulio Ingram MD Referring Internal Medicine [phone]    Herve Morgan MD Referring Internal Medicine - Pediatrics [phone]

## 2023-12-01 ENCOUNTER — OFFICE VISIT (OUTPATIENT)
Dept: AUDIOLOGY | Facility: CLINIC | Age: 68
End: 2023-12-01
Payer: COMMERCIAL

## 2023-12-01 DIAGNOSIS — H90.3 SENSORINEURAL HEARING LOSS, ASYMMETRICAL: Primary | ICD-10-CM

## 2023-12-01 DIAGNOSIS — H90.3 ASYMMETRICAL SENSORINEURAL HEARING LOSS: Primary | ICD-10-CM

## 2023-12-01 PROCEDURE — 92567 TYMPANOMETRY: CPT | Performed by: AUDIOLOGIST

## 2023-12-01 PROCEDURE — 92557 COMPREHENSIVE HEARING TEST: CPT | Mod: 52 | Performed by: AUDIOLOGIST

## 2023-12-01 NOTE — PROGRESS NOTES
AUDIOLOGY REPORT     SUMMARY: Audiology visit completed. See audiogram for results.       RECOMMENDATIONS: Follow-up with ENT.    Gray Daily, CCC-A  [place] Licensed Audiologist #[id number]

## 2023-12-06 ENCOUNTER — TELEPHONE (OUTPATIENT)
Dept: PEDIATRICS | Facility: CLINIC | Age: 68
End: 2023-12-06
Payer: COMMERCIAL

## 2023-12-06 DIAGNOSIS — I63.89 RIGHT TEMPORAL LOBE INFARCTION (H): ICD-10-CM

## 2023-12-06 RX ORDER — ATORVASTATIN CALCIUM 40 MG/1
40 TABLET, FILM COATED ORAL EVERY EVENING
Qty: 90 TABLET | Refills: 1 | Status: SHIPPED | OUTPATIENT
Start: 2023-12-06 | End: 2024-04-25

## 2023-12-06 NOTE — TELEPHONE ENCOUNTER
Pending Prescriptions:                       Disp   Refills    atorvastatin (LIPITOR) 40 MG tablet       30 tab*0            Sig: Take 1 tablet (40 mg) by mouth every evening

## 2023-12-06 NOTE — TELEPHONE ENCOUNTER
Medication Question or Refill        What medication are you calling about (include dose and sig)?: atorvastatin (LIPITOR) 40 MG tablet (90 day supply)    Preferred Pharmacy:   [id number] - [place], [place] - [address]  [address] [address]  Phone: [phone] Fax: [phone]      Controlled Substance Agreement on file:   CSA -- Patient Level:     [Media Unavailable] Controlled Substance Agreement - Non - Opioid - Scan on 11/6/2019  8:10 AM: NON-OPIOID CONTROLLED SUBSTANCE AGREEMENT       Who prescribed the medication?: MD Eddie    Do you need a refill? Yes    When did you use the medication last? 11/30/23    Patient offered an appointment? No    Do you have any questions or concerns?  Yes: Patient is completely out, has been taking old RX of simvastatin to get him through       Could we send this information to you in [facility] or would you prefer to receive a phone call?:   Patient would prefer a phone call   Okay to leave a detailed message?: Yes at Cell number on file:    Telephone Information:   Mobile [phone]

## 2023-12-08 ENCOUNTER — LAB (OUTPATIENT)
Dept: LAB | Facility: CLINIC | Age: 68
End: 2023-12-08
Payer: COMMERCIAL

## 2023-12-08 ENCOUNTER — ANTICOAGULATION THERAPY VISIT (OUTPATIENT)
Dept: ANTICOAGULATION | Facility: CLINIC | Age: 68
End: 2023-12-08

## 2023-12-08 DIAGNOSIS — Z95.2 AORTIC VALVE PROSTHESIS PRESENT: ICD-10-CM

## 2023-12-08 DIAGNOSIS — I48.0 PAF (PAROXYSMAL ATRIAL FIBRILLATION) (H): Primary | ICD-10-CM

## 2023-12-08 DIAGNOSIS — I48.0 PAF (PAROXYSMAL ATRIAL FIBRILLATION) (H): ICD-10-CM

## 2023-12-08 LAB — INR BLD: 4.9 (ref 0.9–1.1)

## 2023-12-08 PROCEDURE — 85610 PROTHROMBIN TIME: CPT

## 2023-12-08 PROCEDURE — 36416 COLLJ CAPILLARY BLOOD SPEC: CPT

## 2023-12-08 NOTE — PROGRESS NOTES
ANTICOAGULATION MANAGEMENT     Todd S Aschoff 67 year old male is on warfarin with supratherapeutic INR result. (Goal INR 2.5-3.5)    Recent labs: (last 7 days)     12/08/23  1107   INR 4.9*       ASSESSMENT     Source(s): Chart Review and Patient/Caregiver Call     Warfarin doses taken: Warfarin taken differently, but did not change total weekly dose  Diet: No new diet changes identified  Medication/supplement changes: patient is off of simvastatin and now taking atorvastatin.  Per Up to Date, Simvastatin can increase INR and atorvastatin does not have an effect.  New illness, injury, or hospitalization: No. Bronchitis symptoms have resolved.  Signs or symptoms of bleeding or clotting: No  Previous result: Therapeutic last visit; previously outside of goal range  Additional findings: None       PLAN     Recommended plan for no diet, medication or health factor changes affecting INR     Dosing Instructions: partial hold then decrease your warfarin dose (12.5% change) with next INR in 10 days       Summary  As of 12/8/2023      Full warfarin instructions:  12/8: 2.5 mg; Otherwise 5 mg every day   Next INR check:  12/18/2023               Telephone call with Nicko who agrees to plan and repeated back plan correctly    Lab visit scheduled    Education provided:   Please call back if any changes to your diet, medications or how you've been taking warfarin    Plan made per [facility] anticoagulation protocol    Amanda Wheeler RN  Anticoagulation Clinic  12/8/2023    _______________________________________________________________________     Anticoagulation Episode Summary       Current INR goal:  2.5-3.5   TTR:  67.0% (1 y)   Target end date:  Indefinite   Send INR reminders to:  AIDA GUERRERO    Indications    PAF (paroxysmal atrial fibrillation) (H) [I48.0]  Mechanical AV Replacement 1992 -- on Warfarin  [Z95.2]             Comments:               Anticoagulation Care Providers       Provider Role Specialty Phone number     Obdulio Ingram MD Referring Internal Medicine [phone]    Herve Morgan MD Referring Internal Medicine - Pediatrics [phone]

## 2023-12-12 DIAGNOSIS — Z95.2 AORTIC VALVE PROSTHESIS PRESENT: ICD-10-CM

## 2023-12-12 DIAGNOSIS — I63.9 CEREBROVASCULAR ACCIDENT (CVA), UNSPECIFIED MECHANISM (H): Primary | ICD-10-CM

## 2023-12-12 DIAGNOSIS — I48.0 PAF (PAROXYSMAL ATRIAL FIBRILLATION) (H): ICD-10-CM

## 2023-12-12 RX ORDER — WARFARIN SODIUM 5 MG/1
TABLET ORAL
Qty: 90 TABLET | Refills: 0 | Status: SHIPPED | OUTPATIENT
Start: 2023-12-12 | End: 2024-03-18

## 2023-12-12 NOTE — TELEPHONE ENCOUNTER
ANTICOAGULATION MANAGEMENT:  Medication Refill    Anticoagulation Summary  As of 12/8/2023      Warfarin maintenance plan:  5 mg (5 mg x 1) every day   Next INR check:  12/18/2023   Target end date:  Indefinite    Indications    PAF (paroxysmal atrial fibrillation) (H) [I48.0]  Mechanical AV Replacement 1992 -- on Warfarin  [Z95.2]                 Anticoagulation Care Providers       Provider Role Specialty Phone number    Obdulio Ingram MD Referring Internal Medicine [phone]    Herve Morgan MD Referring Internal Medicine - Pediatrics [phone]            Refill Criteria    Visit with referring provider/group: Meets criteria: office visit within referring provider group in the last 1 year on 9/15/23    [facility] referral signed last signed: 05/02/2023; within last year: Yes    Lab monitoring not exceeding 2 weeks overdue: Yes    Nicko meets all criteria for refill. Rx instructions and quantity supplied updated to match patient's current dosing plan.  90 day supply with 0 refills granted per [facility] protocol     Ursula Babin RN  Anticoagulation Clinic

## 2023-12-14 DIAGNOSIS — Z95.2 AORTIC VALVE PROSTHESIS PRESENT: ICD-10-CM

## 2023-12-14 DIAGNOSIS — I48.0 PAF (PAROXYSMAL ATRIAL FIBRILLATION) (H): ICD-10-CM

## 2023-12-14 DIAGNOSIS — I63.9 CEREBROVASCULAR ACCIDENT (CVA), UNSPECIFIED MECHANISM (H): ICD-10-CM

## 2023-12-14 RX ORDER — WARFARIN SODIUM 5 MG/1
TABLET ORAL
Qty: 90 TABLET | Refills: 0 | OUTPATIENT
Start: 2023-12-14

## 2023-12-18 ENCOUNTER — LAB (OUTPATIENT)
Dept: LAB | Facility: CLINIC | Age: 68
End: 2023-12-18
Payer: COMMERCIAL

## 2023-12-18 ENCOUNTER — ANTICOAGULATION THERAPY VISIT (OUTPATIENT)
Dept: ANTICOAGULATION | Facility: CLINIC | Age: 68
End: 2023-12-18

## 2023-12-18 DIAGNOSIS — I48.0 PAF (PAROXYSMAL ATRIAL FIBRILLATION) (H): Primary | ICD-10-CM

## 2023-12-18 DIAGNOSIS — I48.0 PAF (PAROXYSMAL ATRIAL FIBRILLATION) (H): ICD-10-CM

## 2023-12-18 DIAGNOSIS — Z95.2 AORTIC VALVE PROSTHESIS PRESENT: ICD-10-CM

## 2023-12-18 LAB — INR BLD: 2.9 (ref 0.9–1.1)

## 2023-12-18 PROCEDURE — 36416 COLLJ CAPILLARY BLOOD SPEC: CPT

## 2023-12-18 PROCEDURE — 85610 PROTHROMBIN TIME: CPT

## 2023-12-18 NOTE — PROGRESS NOTES
ANTICOAGULATION MANAGEMENT     Todd S Aschoff 67 year old male is on warfarin with therapeutic INR result. (Goal INR 2.5-3.5)    Recent labs: (last 7 days)     12/18/23  1123   INR 2.9*     Warfarin maintenance dose was decreased 12.5% at last visit    ASSESSMENT     Source(s): Chart Review and Patient/Caregiver Call     Warfarin doses taken: Warfarin taken as instructed  Diet: Increased greens/vitamin K in diet; plans to resume previous intake  Medication/supplement changes: None noted  New illness, injury, or hospitalization: No  Signs or symptoms of bleeding or clotting: No  Previous result: Supratherapeutic  Additional findings: Warfarin maintenance dose was decreased 12.5% at last visit       PLAN     Recommended plan for temporary change(s) affecting INR     Dosing Instructions: Continue your current warfarin dose with next INR in 2 weeks       Summary  As of 12/18/2023      Full warfarin instructions:  5 mg every day   Next INR check:  1/2/2024               Telephone call with Nicko who verbalizes understanding and agrees to plan    Lab visit scheduled    Education provided:   Dietary considerations: importance of consistent vitamin K intake    Plan made per [facility] anticoagulation protocol    Angely Smalls, RN  Anticoagulation Clinic  12/18/2023    _______________________________________________________________________     Anticoagulation Episode Summary       Current INR goal:  2.5-3.5   TTR:  65.1% (1 y)   Target end date:  Indefinite   Send INR reminders to:  AIDA GUERRERO    Indications    PAF (paroxysmal atrial fibrillation) (H) [I48.0]  Mechanical AV Replacement 1992 -- on Warfarin  [Z95.2]             Comments:               Anticoagulation Care Providers       Provider Role Specialty Phone number    Obdulio Ingram MD Referring Internal Medicine [phone]    Herve Morgan MD Referring Internal Medicine - Pediatrics [phone]

## 2023-12-20 DIAGNOSIS — M62.830 BACK MUSCLE SPASM: ICD-10-CM

## 2023-12-20 RX ORDER — DIAZEPAM 5 MG
5 TABLET ORAL
Qty: 30 TABLET | Refills: 0 | Status: SHIPPED | OUTPATIENT
Start: 2023-12-20 | End: 2024-01-21

## 2024-01-01 NOTE — ED AVS SNAPSHOT
[facility]    [address]    [facility] [address]    Phone:  [phone]    Fax:  [phone]                                       Todd S Aschoff   MRN: [id number]    Department:  [facility]   Date of Visit:  3/13/2017           After Visit Summary Signature Page     I have received my discharge instructions, and my questions have been answered. I have discussed any challenges I see with this plan with the nurse or doctor.    ..........................................................................................................................................  Patient/Patient Representative Signature      ..........................................................................................................................................  Patient Representative Print Name and Relationship to Patient    ..................................................               ................................................  Date                                            Time    ..........................................................................................................................................  Reviewed by Signature/Title    ...................................................              ..............................................  Date                                                            Time           -Wash hands before touching your baby.

## 2024-01-02 ENCOUNTER — LAB (OUTPATIENT)
Dept: LAB | Facility: CLINIC | Age: 69
End: 2024-01-02
Payer: COMMERCIAL

## 2024-01-02 ENCOUNTER — ANTICOAGULATION THERAPY VISIT (OUTPATIENT)
Dept: ANTICOAGULATION | Facility: CLINIC | Age: 69
End: 2024-01-02

## 2024-01-02 DIAGNOSIS — I48.0 PAF (PAROXYSMAL ATRIAL FIBRILLATION) (H): Primary | ICD-10-CM

## 2024-01-02 DIAGNOSIS — Z95.2 AORTIC VALVE PROSTHESIS PRESENT: ICD-10-CM

## 2024-01-02 DIAGNOSIS — I48.0 PAF (PAROXYSMAL ATRIAL FIBRILLATION) (H): ICD-10-CM

## 2024-01-02 LAB — INR BLD: 2.7 (ref 0.9–1.1)

## 2024-01-02 PROCEDURE — 36416 COLLJ CAPILLARY BLOOD SPEC: CPT

## 2024-01-02 PROCEDURE — 85610 PROTHROMBIN TIME: CPT

## 2024-01-02 NOTE — PROGRESS NOTES
ANTICOAGULATION MANAGEMENT     Todd S Aschoff 68 year old male is on warfarin with therapeutic INR result. (Goal INR 2.5-3.5)    Recent labs: (last 7 days)     01/02/24  1138   INR 2.7*       ASSESSMENT     Source(s): Chart Review and Patient/Caregiver Call     Warfarin doses taken: Warfarin taken as instructed  Diet: Decreased greens/vitamin K in diet; plans to resume previous intake, patient reports he has not had any green veggies over the last week  Medication/supplement changes: None noted  New illness, injury, or hospitalization: No  Signs or symptoms of bleeding or clotting: No  Previous result: Therapeutic last visit; previously outside of goal range  Additional findings: None       PLAN     Recommended plan for temporary change(s) affecting INR. Will check INR sooner as INR continues to trend downward since warfarin maintenance dose decrease 12/8/23 and patient reports no green veggies this past week.       Dosing Instructions: Continue your current warfarin dose with next INR in 2 weeks     Summary  As of 1/2/2024      Full warfarin instructions:  5 mg every day   Next INR check:  1/16/2024               Telephone call with Nicko who verbalizes understanding and agrees to plan    Lab visit scheduled    Education provided:   Please call back if any changes to your diet, medications or how you've been taking warfarin  Contact [phone]  with any changes, questions or concerns.     Plan made per [facility] anticoagulation protocol    Lucina Andrew RN  Anticoagulation Clinic  1/2/2024    _______________________________________________________________________     Anticoagulation Episode Summary       Current INR goal:  2.5-3.5   TTR:  65.1% (1 y)   Target end date:  Indefinite   Send INR reminders to:  [facility]    Indications    PAF (paroxysmal atrial fibrillation) (H) [I48.0]  Mechanical AV Replacement 1992 -- on Warfarin  [Z95.2]             Comments:               Anticoagulation Care Providers        Provider Role Specialty Phone number    Obdulio Ingram MD Referring Internal Medicine [phone]    Herve Morgan MD Referring Internal Medicine - Pediatrics [phone]

## 2024-01-21 DIAGNOSIS — M62.830 BACK MUSCLE SPASM: ICD-10-CM

## 2024-01-21 RX ORDER — DIAZEPAM 5 MG
5 TABLET ORAL
Qty: 30 TABLET | Refills: 0 | Status: SHIPPED | OUTPATIENT
Start: 2024-01-21 | End: 2024-02-19

## 2024-01-24 ENCOUNTER — ANTICOAGULATION THERAPY VISIT (OUTPATIENT)
Dept: ANTICOAGULATION | Facility: CLINIC | Age: 69
End: 2024-01-24

## 2024-01-24 ENCOUNTER — LAB (OUTPATIENT)
Dept: LAB | Facility: CLINIC | Age: 69
End: 2024-01-24
Payer: COMMERCIAL

## 2024-01-24 DIAGNOSIS — I48.0 PAF (PAROXYSMAL ATRIAL FIBRILLATION) (H): Primary | ICD-10-CM

## 2024-01-24 DIAGNOSIS — Z95.2 AORTIC VALVE PROSTHESIS PRESENT: ICD-10-CM

## 2024-01-24 DIAGNOSIS — I48.0 PAF (PAROXYSMAL ATRIAL FIBRILLATION) (H): ICD-10-CM

## 2024-01-24 LAB — INR BLD: 1.9 (ref 0.9–1.1)

## 2024-01-24 PROCEDURE — 85610 PROTHROMBIN TIME: CPT

## 2024-01-24 PROCEDURE — 36416 COLLJ CAPILLARY BLOOD SPEC: CPT

## 2024-01-24 NOTE — PROGRESS NOTES
ANTICOAGULATION MANAGEMENT     Todd S Aschoff 68 year old male is on warfarin with subtherapeutic INR result. (Goal INR 2.5-3.5)    Recent labs: (last 7 days)     01/24/24  1337   INR 1.9*       ASSESSMENT     Source(s): Chart Review  Previous INR was Therapeutic last 2(+) visits  Medication, diet, health changes since last INR chart reviewed; none identified         PLAN     Unable to reach Nicko today.    Left message to take a booster dose of warfarin,  7.5 mg tonight. Request call back for assessment.    Follow up required to confirm warfarin dose taken and assess for changes, discuss out of range result , and discuss dosing instructions and confirm understanding of instructions    Angely Smalls RN  Anticoagulation Clinic  1/24/2024

## 2024-01-24 NOTE — PROGRESS NOTES
ANTICOAGULATION MANAGEMENT     Todd S Aschoff 68 year old male is on warfarin with subtherapeutic INR result. (Goal INR 2.5-3.5)    Recent labs: (last 7 days)     01/24/24  1337   INR 1.9*       ASSESSMENT     Source(s): Chart Review and Patient/Caregiver Call     Warfarin doses taken: Warfarin taken as instructed  Diet: No new diet changes identified  Medication/supplement changes: None noted  New illness, injury, or hospitalization: No; however, he reports that he cannot have shoulder repair surgery until his BMI is <30, currently is at 42.  Signs or symptoms of bleeding or clotting: No  Previous result: Therapeutic last 2(+) visits  Additional findings: INR has been trending down, chart reviewed with [facility]       PLAN     Recommended plan for no diet, medication or health factor changes affecting INR     Dosing Instructions: Increase your warfarin dose (14% change) with next INR in 1 week       Summary  As of 1/24/2024      Full warfarin instructions:  7.5 mg every Wed, Sat; 5 mg all other days   Next INR check:  1/31/2024               Telephone call with Nicko who verbalizes understanding and agrees to plan    Lab visit scheduled    Education provided:   Contact [phone]  with any changes, questions or concerns.     Plan made per [facility] anticoagulation protocol    Angely Smalls, RN  Anticoagulation Clinic  1/24/2024    _______________________________________________________________________     Anticoagulation Episode Summary       Current INR goal:  2.5-3.5   TTR:  60.6% (1 y)   Target end date:  Indefinite   Send INR reminders to:  [facility]    Indications    PAF (paroxysmal atrial fibrillation) (H) [I48.0]  Mechanical AV Replacement 1992 -- on Warfarin  [Z95.2]             Comments:               Anticoagulation Care Providers       Provider Role Specialty Phone number    Obdulio Ingram MD Referring Internal Medicine [phone]    Herve Morgan MD Referring Internal Medicine - Pediatrics  [phone]

## 2024-01-31 ENCOUNTER — ANTICOAGULATION THERAPY VISIT (OUTPATIENT)
Dept: ANTICOAGULATION | Facility: CLINIC | Age: 69
End: 2024-01-31

## 2024-01-31 ENCOUNTER — LAB (OUTPATIENT)
Dept: LAB | Facility: CLINIC | Age: 69
End: 2024-01-31
Payer: COMMERCIAL

## 2024-01-31 DIAGNOSIS — Z95.2 AORTIC VALVE PROSTHESIS PRESENT: ICD-10-CM

## 2024-01-31 DIAGNOSIS — I48.0 PAF (PAROXYSMAL ATRIAL FIBRILLATION) (H): Primary | ICD-10-CM

## 2024-01-31 DIAGNOSIS — I48.0 PAF (PAROXYSMAL ATRIAL FIBRILLATION) (H): ICD-10-CM

## 2024-01-31 LAB — INR BLD: 2.5 (ref 0.9–1.1)

## 2024-01-31 PROCEDURE — 85610 PROTHROMBIN TIME: CPT

## 2024-01-31 PROCEDURE — 36416 COLLJ CAPILLARY BLOOD SPEC: CPT

## 2024-01-31 NOTE — PROGRESS NOTES
ANTICOAGULATION MANAGEMENT     Todd S Aschoff 68 year old male is on warfarin with therapeutic INR result. (Goal INR 2.5-3.5)    Recent labs: (last 7 days)     01/31/24  1048   INR 2.5*       ASSESSMENT     Source(s): Chart Review and Patient/Caregiver Call     Warfarin doses taken: Warfarin taken as instructed + MD previously increased 14%  Diet: No new diet changes identified  Medication/supplement changes: None noted  New illness, injury, or hospitalization: No  Signs or symptoms of bleeding or clotting: No  Previous result: Subtherapeutic  Additional findings: Ongoing attempts to loose weight in preparation for shoulder surgery (BMI <30 required)       PLAN     Recommended plan for ongoing change(s) affecting INR     Dosing Instructions: Continue your current warfarin dose with next INR in 2 weeks       Summary  As of 1/31/2024      Full warfarin instructions:  7.5 mg every Wed, Sat; 5 mg all other days   Next INR check:  2/14/2024               Telephone call with Nicko who verbalizes understanding and agrees to plan    Lab visit scheduled    Education provided:   Please call back if any changes to your diet, medications or how you've been taking warfarin  Symptom monitoring: monitoring for bleeding signs and symptoms and monitoring for clotting signs and symptoms    Plan made per [facility] anticoagulation protocol    Sienna Radford RN  Anticoagulation Clinic  1/31/2024    _______________________________________________________________________     Anticoagulation Episode Summary       Current INR goal:  2.5-3.5   TTR:  58.6% (1 y)   Target end date:  Indefinite   Send INR reminders to:  [facility]    Indications    PAF (paroxysmal atrial fibrillation) (H) [I48.0]  Mechanical AV Replacement 1992 -- on Warfarin  [Z95.2]             Comments:               Anticoagulation Care Providers       Provider Role Specialty Phone number    Obdulio Ingram MD Referring Internal Medicine [phone]    Herve Morgan MD  [facility] Internal Medicine - Pediatrics [phone]

## 2024-02-13 ENCOUNTER — ANTICOAGULATION THERAPY VISIT (OUTPATIENT)
Dept: LAB | Facility: CLINIC | Age: 69
End: 2024-02-13

## 2024-02-13 ENCOUNTER — LAB (OUTPATIENT)
Dept: LAB | Facility: CLINIC | Age: 69
End: 2024-02-13
Payer: COMMERCIAL

## 2024-02-13 DIAGNOSIS — Z95.2 AORTIC VALVE PROSTHESIS PRESENT: ICD-10-CM

## 2024-02-13 DIAGNOSIS — I48.0 PAF (PAROXYSMAL ATRIAL FIBRILLATION) (H): ICD-10-CM

## 2024-02-13 DIAGNOSIS — I48.0 PAF (PAROXYSMAL ATRIAL FIBRILLATION) (H): Primary | ICD-10-CM

## 2024-02-13 LAB — INR BLD: 3.2 (ref 0.9–1.1)

## 2024-02-13 PROCEDURE — 85610 PROTHROMBIN TIME: CPT

## 2024-02-13 PROCEDURE — 36416 COLLJ CAPILLARY BLOOD SPEC: CPT

## 2024-02-13 NOTE — PROGRESS NOTES
ANTICOAGULATION MANAGEMENT     Todd S Aschoff 68 year old male is on warfarin with therapeutic INR result. (Goal INR 2.5-3.5)    Recent labs: (last 7 days)     02/13/24  1137   INR 3.2*       ASSESSMENT     Source(s): Chart Review and Patient/Caregiver Call     Warfarin doses taken: Warfarin taken differently, but did not change total weekly dose  Diet: patient has started to partake in intermittent fasting, I encouraged him to keep his vitamin K intake consistent.  Medication/supplement changes: None noted  New illness, injury, or hospitalization: No  Signs or symptoms of bleeding or clotting: No  Previous result: Therapeutic last visit; previously outside of goal range  Additional findings: None       PLAN     Recommended plan for ongoing change(s) affecting INR     Dosing Instructions: Continue your current warfarin dose with next INR in 3 weeks       Summary  As of 2/13/2024      Full warfarin instructions:  7.5 mg every Wed, Sat; 5 mg all other days   Next INR check:  3/5/2024               Telephone call with Nicko who verbalizes understanding and agrees to plan    Lab visit scheduled    Education provided:   Dietary considerations: importance of consistent vitamin K intake    Plan made per [facility]agulation protocol    Angely Smalls RN  Anticoagulation Clinic  2/13/2024    _______________________________________________________________________     Anticoagulation Episode Summary       Current INR goal:  2.5-3.5   TTR:  58.6% (1 y)   Target end date:  Indefinite   Send INR reminders to:  A[facility] GUERRERO    Indications    PAF (paroxysmal atrial fibrillation) (H) [I48.0]  Mechanical AV Replacement 1992 -- on Warfarin  [Z95.2]             Comments:               Anticoagulation Care Providers       Provider Role Specialty Phone number    Obdulio Ingram MD Referring Internal Medicine [phone]    Herve Morgan MD Referring Internal Medicine - Pediatrics [phone]

## 2024-02-19 DIAGNOSIS — M62.830 BACK MUSCLE SPASM: ICD-10-CM

## 2024-02-19 RX ORDER — DIAZEPAM 5 MG
5 TABLET ORAL
Qty: 30 TABLET | Refills: 0 | Status: SHIPPED | OUTPATIENT
Start: 2024-02-19 | End: 2024-03-25

## 2024-03-05 ENCOUNTER — LAB (OUTPATIENT)
Dept: LAB | Facility: CLINIC | Age: 69
End: 2024-03-05
Payer: COMMERCIAL

## 2024-03-05 ENCOUNTER — ANTICOAGULATION THERAPY VISIT (OUTPATIENT)
Dept: ANTICOAGULATION | Facility: CLINIC | Age: 69
End: 2024-03-05

## 2024-03-05 DIAGNOSIS — I48.0 PAF (PAROXYSMAL ATRIAL FIBRILLATION) (H): ICD-10-CM

## 2024-03-05 DIAGNOSIS — Z95.2 AORTIC VALVE PROSTHESIS PRESENT: ICD-10-CM

## 2024-03-05 DIAGNOSIS — I48.0 PAF (PAROXYSMAL ATRIAL FIBRILLATION) (H): Primary | ICD-10-CM

## 2024-03-05 LAB — INR BLD: 3.4 (ref 0.9–1.1)

## 2024-03-05 PROCEDURE — 85610 PROTHROMBIN TIME: CPT

## 2024-03-05 PROCEDURE — 36416 COLLJ CAPILLARY BLOOD SPEC: CPT

## 2024-03-05 NOTE — PROGRESS NOTES
ANTICOAGULATION MANAGEMENT     Todd S Aschoff 68 year old male is on warfarin with therapeutic INR result. (Goal INR 2.5-3.5)    Recent labs: (last 7 days)     03/05/24  1128   INR 3.4*       ASSESSMENT     Source(s): Chart Review and Patient/Caregiver Call     Warfarin doses taken: Warfarin taken as instructed  Diet: No new diet changes identified  Medication/supplement changes: None noted  New illness, injury, or hospitalization: No  Signs or symptoms of bleeding or clotting: No  Previous result: Therapeutic last 2(+) visits  Additional findings: None       PLAN     Recommended plan for no diet, medication or health factor changes affecting INR     Dosing Instructions: Continue your current warfarin dose with next INR in 4 weeks       Summary  As of 3/5/2024      Full warfarin instructions:  7.5 mg every Tue, Sat; 5 mg all other days   Next INR check:  4/2/2024               Telephone call with Nicko who verbalizes understanding and agrees to plan    Lab visit scheduled    Education provided:   Contact [phone]  with any changes, questions or concerns.     Plan made per [facility] anticoagulation protocol    Harmony Vaughn RN  Anticoagulation Clinic  3/5/2024    _______________________________________________________________________     Anticoagulation Episode Summary       Current INR goal:  2.5-3.5   TTR:  58.6% (1 y)   Target end date:  Indefinite   Send INR reminders to:  A[facility] GUERRERO    Indications    PAF (paroxysmal atrial fibrillation) (H) [I48.0]  Mechanical AV Replacement 1992 -- on Warfarin  [Z95.2]             Comments:               Anticoagulation Care Providers       Provider Role Specialty Phone number    Obdulio Ingram MD Referring Internal Medicine [phone]    Herve Morgan MD Referring Internal Medicine - Pediatrics [phone]

## 2024-03-13 ENCOUNTER — OFFICE VISIT (OUTPATIENT)
Dept: UROLOGY | Facility: CLINIC | Age: 69
End: 2024-03-13
Payer: COMMERCIAL

## 2024-03-13 VITALS
SYSTOLIC BLOOD PRESSURE: 121 MMHG | OXYGEN SATURATION: 96 % | WEIGHT: 315 LBS | HEIGHT: 71 IN | BODY MASS INDEX: 44.1 KG/M2 | DIASTOLIC BLOOD PRESSURE: 78 MMHG | HEART RATE: 76 BPM

## 2024-03-13 DIAGNOSIS — R97.20 ELEVATED PROSTATE SPECIFIC ANTIGEN (PSA): ICD-10-CM

## 2024-03-13 DIAGNOSIS — R68.82 LOW LIBIDO: ICD-10-CM

## 2024-03-13 DIAGNOSIS — N13.8 BPH WITH OBSTRUCTION/LOWER URINARY TRACT SYMPTOMS: Primary | ICD-10-CM

## 2024-03-13 DIAGNOSIS — F33.1 MAJOR DEPRESSIVE DISORDER, RECURRENT EPISODE, MODERATE (H): ICD-10-CM

## 2024-03-13 DIAGNOSIS — N32.81 OAB (OVERACTIVE BLADDER): ICD-10-CM

## 2024-03-13 DIAGNOSIS — N40.1 BPH WITH OBSTRUCTION/LOWER URINARY TRACT SYMPTOMS: Primary | ICD-10-CM

## 2024-03-13 DIAGNOSIS — N52.9 ERECTILE DYSFUNCTION, UNSPECIFIED ERECTILE DYSFUNCTION TYPE: ICD-10-CM

## 2024-03-13 LAB
PSA SERPL-MCNC: 2.7 UG/L (ref 0–4)
RESIDUAL VOLUME (RV) (EXTERNAL): NORMAL

## 2024-03-13 PROCEDURE — 51798 US URINE CAPACITY MEASURE: CPT | Performed by: UROLOGY

## 2024-03-13 PROCEDURE — 84153 ASSAY OF PSA TOTAL: CPT | Performed by: UROLOGY

## 2024-03-13 PROCEDURE — 99214 OFFICE O/P EST MOD 30 MIN: CPT | Mod: 25 | Performed by: UROLOGY

## 2024-03-13 PROCEDURE — 36415 COLL VENOUS BLD VENIPUNCTURE: CPT | Performed by: UROLOGY

## 2024-03-13 RX ORDER — MIRABEGRON 50 MG/1
50 TABLET, EXTENDED RELEASE ORAL DAILY
Qty: 90 TABLET | Refills: 3 | Status: SHIPPED | OUTPATIENT
Start: 2024-03-13 | End: 2025-03-13

## 2024-03-13 RX ORDER — GUANFACINE 1 MG/1
1 TABLET ORAL AT BEDTIME
Qty: 90 TABLET | Refills: 1 | Status: SHIPPED | OUTPATIENT
Start: 2024-03-13 | End: 2024-09-09

## 2024-03-13 ASSESSMENT — PAIN SCALES - GENERAL: PAINLEVEL: NO PAIN (0)

## 2024-03-13 NOTE — LETTER
3/13/2024       RE: Todd S Aschoff  [address]  [place] [address]     Dear Colleague,    Thank you for referring your patient, Todd S Aschoff, to the [facility] at [facility]. Please see a copy of my visit note below.    SOUTHDAKEV  CHIEF COMPLAINT   It was my pleasure to see Todd S Aschoff who is a 68 year old male for follow-up of BPH, OAB (over-active bladder), Erectile dysfunction, low libido    HPI   Todd S Aschoff is a very pleasant 68 year old male who presents with a history of Elevated PSA.  Patient had previously followed with Dr. Gonzales and was referred to Dr. Mock for discussion of possible InterStim or Botox for his overactive bladder symptoms.  However he was noted to have an elevated PSA and I discussed with him proceeding with a prostate biopsy.  He underwent a MR fusion biopsy on February 17 which was based on his prior MRI from 2017.  He returns today to discuss the results of his biopsy.  There is no evidence of prostate cancer.    6/23/21:  On mirabegron (Myrbetriq) for the past 2 years   He is on Warfarin secondary to a heart valve   Follow-up today he is very interested in a REZUM  He does not recall discussion of an InterStim    7/26/21:  Follow-up today for cystoscopy  He notes his most bothersome symptom is nocturia and proceed frequency  He does note that he has had 4 small strokes in the prior year    AUASS: 3-3-1-0-1-0-2/3 = 10/11  QOL = 5  PVR = 34cc    9/22/21:  Urodynamics (UDS) with Purnima Sutherland  POSTPROCEDURE DIAGNOSES:  -Maximum cystometric capacity ~240 mL with heightened filling sensations.  -Good bladder compliance with high pressure detrusor overactivity (Pdet reaches >100 cm H2O at the peak of these uninhibited detrusor contractions) with associated urgency incontinence starting at bladder volumes >150 mL. He leaks with the first episode of DO but is then able to suppress the incontinence and the  "bladder spasm subsides. During the second episode of DO, he is able to suppress incontinence but expresses imminent desire to void and is thus given permission to void.  -Strong detrusor contraction during voiding reaching 109 cm H2O.   -He voids 221 mL with slow flow (Qmax 12 ml/s) and mild incomplete bladder emptying (PVR 20 mL). On fluoroscopy, a small volume of contrast is retained within bilateral bladder diverticuli post-void.   -BOOI is 68.4 which is suggestive for bladder outlet obstruction.  -Fluoroscopy otherwise reveals a moderately trabeculated bladder wall without vesicoureteral reflux. The bladder neck initially appears closed during filling and appears open during episodes of incontinence and voiding.     9/29/21:  Follow up to discuss Urodynamics (UDS)  He remains very interested in the Rezum procedure    12/1/21:  REZUM    1/12/22:  He is doing very well after the procedure  Stream is slightly weak  Flow is better   Frequency and urgency is much better  Very happy with the results    PVR = 90cc    TODAY 3/13/2024:  Follow-up today for symptom check  He notes ongoing OAB symptoms  He notes he was previously taking an OAB medication, he cannot remember the name, but has stopped this in the last year or so and would like to restart this.  Looking back it seems this was Myrbetriq  He reports symptoms of low libido and erectile dysfunction, though prior testosterone checked were within normal limits he would like this addressed again    AUASS: 3-4-3-5-4-0-3 = 19  QOL = 4  PVR = 71cc    PHYSICAL EXAM  Patient is a 68 year old  male   Vitals: Blood pressure 121/78, pulse 76, height 1.803 m (5' 11\"), weight 144.7 kg (319 lb), SpO2 96%.  Body mass index is 44.49 kg/m .  General Appearance Adult:   Alert, no acute distress, oriented  HENT: throat/mouth:normal, good dentition  Lungs: no respiratory distress, or pursed lip breathing  Heart: No obvious jugular venous distension present  Abdomen: Obesely " distended  Neuro: Alert, oriented, speech and mentation normal  Psych: affect and mood normal  Gait: Normal       PSA PSA Diag Urologic Phys   Latest Ref Rng 0.00 - 4.00 ug/L 0.00 - 4.00 ng/mL   4/20/2004 0.56     5/12/2006 1.11     9/5/2007 1.82     10/29/2008 2.46     10/23/2009 1.44     12/27/2010 4.68 (H)     1/6/2012 4.13 (H)     10/31/2012 4.08 (H)     12/17/2013 3.70     12/24/2014 3.37     1/8/2016 4.57 (H)     1/25/2017  13.80 (H)    2/22/2017 5.84 (H)     11/1/2017  3.80    6/12/2018 3.84     10/29/2019 6.28 (H)     1/14/2020 6.93 (H)     7/26/2021 4.80 (H)     3/13/2024 2.70        Creatinine   Date Value Ref Range Status   11/03/2023 1.00 0.67 - 1.17 mg/dL Final   06/18/2021 1.02 0.66 - 1.25 mg/dL Final       Testosterone Total   Latest Ref Rng 240 - 950 ng/dL   9/2/2022 256    10/3/2022 247        PATHOLOGY:  2/17/2020  A.-M. - no evidence of disease     IMAGING:  All pertinent imaging reviewed:    All imaging studies reviewed by me.  I personally reviewed these imaging films.  A formal report from radiology will follow.  FINDINGS:  Prostate gland size: 35 x 51 x 50  Volume: 46.4 g     Peripheral zone: In the left posterior peripheral zone in the mid  gland (series 7 image 31), there is a 4 x 4 by 4 mm focus of T2  hypointensity with moderately decreased ADC signal, no significant  increased signal on high B value diffusion weighted imaging, and no  early enhancement (best visualized on series 9 image 15, series 6  image 19, series 5 image 25, series 3 image 13).     PI-RADS:  Peripheral zone T2: 3  Diffusion-weighted image: 3    Contrast-enhanced images: Negative       Overall assessment: 3     Transitional zone: There are BPH type changes without suspicious  lesion.     PI-RADS:  Transition zone T2: 2  Diffusion-weighted image: 2  Contrast-enhanced images: Negative       Overall assessment: 2     Remainder of the pelvis:  No lymphadenopathy. The visualized  vasculature is patent. Partially  decompressed bladder, otherwise  unremarkable. Partially evaluated bilateral hip prosthesis,  surrounding metal artifact limits evaluation. No suspicious focal  lesions in the bones or the soft tissue. The visualized bowel is  unremarkable. Small bilateral fat-containing inguinal hernias.  Hydrocele.                                                     IMPRESSION:   1. Based on the most suspicious abnormality, this exam is  characterized as PIRADS 3 - Intermediate probability.  The presence of  clinically significant cancer is equivocal. The most suspicious focus  is in the left posterior lateral peripheral zone at the 5:00 position  measuring 4 mm. There is no evidence for extraprostatic extension.  2. No evidence of extraprostatic malignancy. No suspicious adenopathy  or evidence of pelvic metastases.    ASSESSMENT and PLAN  68-year-old man with history of prior elevated PSA, BPH now s/p REZUM with OAB (over-active bladder) symptoms, low libido    Previously elevated PSA  - Reviewed his PSA history and trend  - His PSA today is very reassuring at 2.7 coming down from 4.8 after his Rezum  - I reviewed his prior MRI and reviewed these images personally.  I reviewed his prior biopsy which was negative  - Will continue with annual PSA monitoring    LUTS and overactive bladder symptoms  -His outlet symptoms have improved following his Rezum  - He continues to have some OAB issues and prescription for Myrbetriq 50 mg daily sent to the pharmacy  - Will plan for follow-up in 1 year for symptom check with my PA Brandi in our [place] office    Low libido with erectile dysfunction  - I reviewed his prior testosterone which was within normal limits, though at the lower range  - Order placed for repeat testosterone check we discussed that this should be an early morning lab draw  - I will send him the results on creads and we discussed that given his complex medical history I would likely refer him to endocrinology for  management if found to have low testosterone    Chart documentation with Dragon Voice recognition Software. Although reviewed after completion, some words and grammatical errors may remain.     Time spent: 19 minutes spent on the date of the encounter doing chart review, history and exam, documentation and further activities as noted above.      Note copy attestation: the elements have been reviewed, edited as needed, and remain pertinent to today's visit.     Jamal Andersen MD   Urology  [facility]  Clinic Phone [phone]

## 2024-03-13 NOTE — PROGRESS NOTES
[facility]  CHIEF COMPLAINT   It was my pleasure to see Todd S Aschoff who is a 68 year old male for follow-up of BPH, OAB (over-active bladder), Erectile dysfunction, low libido    HPI   Todd S Aschoff is a very pleasant 68 year old male who presents with a history of Elevated PSA.  Patient had previously followed with Dr. Gonzales and was referred to Dr. Mock for discussion of possible InterStim or Botox for his overactive bladder symptoms.  However he was noted to have an elevated PSA and I discussed with him proceeding with a prostate biopsy.  He underwent a MR fusion biopsy on February 17 which was based on his prior MRI from 2017.  He returns today to discuss the results of his biopsy.  There is no evidence of prostate cancer.    6/23/21:  On mirabegron (Myrbetriq) for the past 2 years   He is on Warfarin secondary to a heart valve   Follow-up today he is very interested in a REZUM  He does not recall discussion of an InterStim    7/26/21:  Follow-up today for cystoscopy  He notes his most bothersome symptom is nocturia and proceed frequency  He does note that he has had 4 small strokes in the prior year    AUASS: 3-3-1-0-1-0-2/3 = 10/11  QOL = 5  PVR = 34cc    9/22/21:  Urodynamics (UDS) with Purnima Sutherland  POSTPROCEDURE DIAGNOSES:  -Maximum cystometric capacity ~240 mL with heightened filling sensations.  -Good bladder compliance with high pressure detrusor overactivity (Pdet reaches >100 cm H2O at the peak of these uninhibited detrusor contractions) with associated urgency incontinence starting at bladder volumes >150 mL. He leaks with the first episode of DO but is then able to suppress the incontinence and the bladder spasm subsides. During the second episode of DO, he is able to suppress incontinence but expresses imminent desire to void and is thus given permission to void.  -Strong detrusor contraction during voiding reaching 109 cm H2O.   -He voids 221 mL with slow flow (Qmax 12 ml/s) and mild incomplete  "bladder emptying (PVR 20 mL). On fluoroscopy, a small volume of contrast is retained within bilateral bladder diverticuli post-void.   -BOOI is 68.4 which is suggestive for bladder outlet obstruction.  -Fluoroscopy otherwise reveals a moderately trabeculated bladder wall without vesicoureteral reflux. The bladder neck initially appears closed during filling and appears open during episodes of incontinence and voiding.     9/29/21:  Follow up to discuss Urodynamics (UDS)  He remains very interested in the Rezum procedure    12/1/21:  REZUM    1/12/22:  He is doing very well after the procedure  Stream is slightly weak  Flow is better   Frequency and urgency is much better  Very happy with the results    PVR = 90cc    TODAY 3/13/2024:  Follow-up today for symptom check  He notes ongoing OAB symptoms  He notes he was previously taking an OAB medication, he cannot remember the name, but has stopped this in the last year or so and would like to restart this.  Looking back it seems this was Myrbetriq  He reports symptoms of low libido and erectile dysfunction, though prior testosterone checked were within normal limits he would like this addressed again    AUASS: -5-4-0-3 = 19  QOL = 4  PVR = 71cc    PHYSICAL EXAM  Patient is a 68 year old  male   Vitals: Blood pressure 121/78, pulse 76, height 1.803 m (5' 11\"), weight 144.7 kg (319 lb), SpO2 96%.  Body mass index is 44.49 kg/m .  General Appearance Adult:   Alert, no acute distress, oriented  HENT: throat/mouth:normal, good dentition  Lungs: no respiratory distress, or pursed lip breathing  Heart: No obvious jugular venous distension present  Abdomen: Obesely distended  Neuro: Alert, oriented, speech and mentation normal  Psych: affect and mood normal  Gait: Normal       PSA PSA Diag Urologic Phys   Latest Ref Rng 0.00 - 4.00 ug/L 0.00 - 4.00 ng/mL   4/20/2004 0.56     5/12/2006 1.11     9/5/2007 1.82     10/29/2008 2.46     10/23/2009 1.44     12/27/2010 4.68 (H)   "   1/6/2012 4.13 (H)     10/31/2012 4.08 (H)     12/17/2013 3.70     12/24/2014 3.37     1/8/2016 4.57 (H)     1/25/2017  13.80 (H)    2/22/2017 5.84 (H)     11/1/2017  3.80    6/12/2018 3.84     10/29/2019 6.28 (H)     1/14/2020 6.93 (H)     7/26/2021 4.80 (H)     3/13/2024 2.70        Creatinine   Date Value Ref Range Status   11/03/2023 1.00 0.67 - 1.17 mg/dL Final   06/18/2021 1.02 0.66 - 1.25 mg/dL Final       Testosterone Total   Latest Ref Rng 240 - 950 ng/dL   9/2/2022 256    10/3/2022 247        PATHOLOGY:  2/17/2020  A.-M. - no evidence of disease     IMAGING:  All pertinent imaging reviewed:    All imaging studies reviewed by me.  I personally reviewed these imaging films.  A formal report from radiology will follow.  FINDINGS:  Prostate gland size: 35 x 51 x 50  Volume: 46.4 g     Peripheral zone: In the left posterior peripheral zone in the mid  gland (series 7 image 31), there is a 4 x 4 by 4 mm focus of T2  hypointensity with moderately decreased ADC signal, no significant  increased signal on high B value diffusion weighted imaging, and no  early enhancement (best visualized on series 9 image 15, series 6  image 19, series 5 image 25, series 3 image 13).     PI-RADS:  Peripheral zone T2: 3  Diffusion-weighted image: 3    Contrast-enhanced images: Negative       Overall assessment: 3     Transitional zone: There are BPH type changes without suspicious  lesion.     PI-RADS:  Transition zone T2: 2  Diffusion-weighted image: 2  Contrast-enhanced images: Negative       Overall assessment: 2     Remainder of the pelvis:  No lymphadenopathy. The visualized  vasculature is patent. Partially decompressed bladder, otherwise  unremarkable. Partially evaluated bilateral hip prosthesis,  surrounding metal artifact limits evaluation. No suspicious focal  lesions in the bones or the soft tissue. The visualized bowel is  unremarkable. Small bilateral fat-containing inguinal hernias.  Hydrocele.                                                      IMPRESSION:   1. Based on the most suspicious abnormality, this exam is  characterized as PIRADS 3 - Intermediate probability.  The presence of  clinically significant cancer is equivocal. The most suspicious focus  is in the left posterior lateral peripheral zone at the 5:00 position  measuring 4 mm. There is no evidence for extraprostatic extension.  2. No evidence of extraprostatic malignancy. No suspicious adenopathy  or evidence of pelvic metastases.    ASSESSMENT and PLAN  68-year-old man with history of prior elevated PSA, BPH now s/p REZUM with OAB (over-active bladder) symptoms, low libido    Previously elevated PSA  - Reviewed his PSA history and trend  - His PSA today is very reassuring at 2.7 coming down from 4.8 after his Rezum  - I reviewed his prior MRI and reviewed these images personally.  I reviewed his prior biopsy which was negative  - Will continue with annual PSA monitoring    LUTS and overactive bladder symptoms  -His outlet symptoms have improved following his Rezum  - He continues to have some OAB issues and prescription for Myrbetriq 50 mg daily sent to the pharmacy  - Will plan for follow-up in 1 year for symptom check with my PA Brandi in our [place] office    Low libido with erectile dysfunction  - I reviewed his prior testosterone which was within normal limits, though at the lower range  - Order placed for repeat testosterone check we discussed that this should be an early morning lab draw  - I will send him the results on uberVU and we discussed that given his complex medical history I would likely refer him to endocrinology for management if found to have low testosterone    Chart documentation with Dragon Voice recognition Software. Although reviewed after completion, some words and grammatical errors may remain.     Time spent: 19 minutes spent on the date of the encounter doing chart review, history and exam, documentation and further activities as  noted above.      Note copy attestation: the elements have been reviewed, edited as needed, and remain pertinent to today's visit.     Jamal Andersen MD   Urology  [facility]  Clinic Phone [phone]

## 2024-03-13 NOTE — NURSING NOTE
Chief Complaint   Patient presents with    oba    Benign Prostatic Hypertrophy     luts    Pvr is 76ml done with bladder scan  Vinny Wick, CMA

## 2024-03-17 DIAGNOSIS — I63.9 CEREBROVASCULAR ACCIDENT (CVA), UNSPECIFIED MECHANISM (H): ICD-10-CM

## 2024-03-17 DIAGNOSIS — I48.0 PAF (PAROXYSMAL ATRIAL FIBRILLATION) (H): ICD-10-CM

## 2024-03-17 DIAGNOSIS — Z95.2 AORTIC VALVE PROSTHESIS PRESENT: ICD-10-CM

## 2024-03-18 ENCOUNTER — APPOINTMENT (OUTPATIENT)
Dept: CT IMAGING | Facility: CLINIC | Age: 69
DRG: 065 | End: 2024-03-18
Attending: EMERGENCY MEDICINE
Payer: COMMERCIAL

## 2024-03-18 ENCOUNTER — APPOINTMENT (OUTPATIENT)
Dept: MRI IMAGING | Facility: CLINIC | Age: 69
DRG: 065 | End: 2024-03-18
Payer: COMMERCIAL

## 2024-03-18 ENCOUNTER — HOSPITAL ENCOUNTER (INPATIENT)
Facility: CLINIC | Age: 69
LOS: 1 days | Discharge: HOME | DRG: 065 | End: 2024-03-20
Attending: EMERGENCY MEDICINE | Admitting: INTERNAL MEDICINE
Payer: COMMERCIAL

## 2024-03-18 DIAGNOSIS — I48.0 PAF (PAROXYSMAL ATRIAL FIBRILLATION) (H): ICD-10-CM

## 2024-03-18 DIAGNOSIS — I10 BENIGN ESSENTIAL HYPERTENSION: ICD-10-CM

## 2024-03-18 DIAGNOSIS — G40.909 NONINTRACTABLE EPILEPSY WITHOUT STATUS EPILEPTICUS, UNSPECIFIED EPILEPSY TYPE (H): ICD-10-CM

## 2024-03-18 DIAGNOSIS — R41.3 MEMORY DIFFICULTIES: ICD-10-CM

## 2024-03-18 DIAGNOSIS — D64.9 ANEMIA, UNSPECIFIED TYPE: ICD-10-CM

## 2024-03-18 DIAGNOSIS — E78.5 HYPERLIPIDEMIA LDL GOAL <130: ICD-10-CM

## 2024-03-18 DIAGNOSIS — Z95.2 AORTIC VALVE PROSTHESIS PRESENT: ICD-10-CM

## 2024-03-18 DIAGNOSIS — R97.20 ELEVATED PROSTATE SPECIFIC ANTIGEN (PSA): ICD-10-CM

## 2024-03-18 DIAGNOSIS — M54.16 LUMBAR RADICULOPATHY: ICD-10-CM

## 2024-03-18 DIAGNOSIS — R20.2 PARESTHESIAS: ICD-10-CM

## 2024-03-18 DIAGNOSIS — I63.9 CEREBROVASCULAR ACCIDENT (CVA), UNSPECIFIED MECHANISM (H): ICD-10-CM

## 2024-03-18 DIAGNOSIS — F32.A DEPRESSION, UNSPECIFIED DEPRESSION TYPE: ICD-10-CM

## 2024-03-18 DIAGNOSIS — Z98.1 S/P LUMBAR FUSION: ICD-10-CM

## 2024-03-18 DIAGNOSIS — I63.9 CEREBROVASCULAR ACCIDENT (CVA), UNSPECIFIED MECHANISM (H): Primary | ICD-10-CM

## 2024-03-18 LAB
ANION GAP SERPL CALCULATED.3IONS-SCNC: 9 MMOL/L (ref 7–15)
APTT PPP: 36 SECONDS (ref 22–38)
ATRIAL RATE - MUSE: 62 BPM
BASOPHILS # BLD AUTO: 0.1 10E3/UL (ref 0–0.2)
BASOPHILS NFR BLD AUTO: 1 %
BUN SERPL-MCNC: 17.6 MG/DL (ref 8–23)
CALCIUM SERPL-MCNC: 9.7 MG/DL (ref 8.8–10.2)
CHLORIDE SERPL-SCNC: 103 MMOL/L (ref 98–107)
CREAT SERPL-MCNC: 1.01 MG/DL (ref 0.67–1.17)
DEPRECATED HCO3 PLAS-SCNC: 29 MMOL/L (ref 22–29)
DIASTOLIC BLOOD PRESSURE - MUSE: NORMAL MMHG
EGFRCR SERPLBLD CKD-EPI 2021: 82 ML/MIN/1.73M2
EOSINOPHIL # BLD AUTO: 0.3 10E3/UL (ref 0–0.7)
EOSINOPHIL NFR BLD AUTO: 5 %
ERYTHROCYTE [DISTWIDTH] IN BLOOD BY AUTOMATED COUNT: 13.2 % (ref 10–15)
GLUCOSE SERPL-MCNC: 125 MG/DL (ref 70–99)
HCT VFR BLD AUTO: 46.6 % (ref 40–53)
HGB BLD-MCNC: 15.8 G/DL (ref 13.3–17.7)
IMM GRANULOCYTES # BLD: 0 10E3/UL
IMM GRANULOCYTES NFR BLD: 0 %
INR PPP: 2.22 (ref 0.85–1.15)
INR PPP: 2.3 (ref 0.85–1.15)
INTERPRETATION ECG - MUSE: NORMAL
LYMPHOCYTES # BLD AUTO: 1.6 10E3/UL (ref 0.8–5.3)
LYMPHOCYTES NFR BLD AUTO: 28 %
MCH RBC QN AUTO: 32.6 PG (ref 26.5–33)
MCHC RBC AUTO-ENTMCNC: 33.9 G/DL (ref 31.5–36.5)
MCV RBC AUTO: 96 FL (ref 78–100)
MONOCYTES # BLD AUTO: 0.6 10E3/UL (ref 0–1.3)
MONOCYTES NFR BLD AUTO: 10 %
NEUTROPHILS # BLD AUTO: 3.1 10E3/UL (ref 1.6–8.3)
NEUTROPHILS NFR BLD AUTO: 56 %
NRBC # BLD AUTO: 0 10E3/UL
NRBC BLD AUTO-RTO: 0 /100
P AXIS - MUSE: 64 DEGREES
PLATELET # BLD AUTO: 209 10E3/UL (ref 150–450)
POTASSIUM SERPL-SCNC: 4.4 MMOL/L (ref 3.4–5.3)
PR INTERVAL - MUSE: 190 MS
QRS DURATION - MUSE: 100 MS
QT - MUSE: 410 MS
QTC - MUSE: 416 MS
R AXIS - MUSE: -42 DEGREES
RBC # BLD AUTO: 4.85 10E6/UL (ref 4.4–5.9)
SODIUM SERPL-SCNC: 141 MMOL/L (ref 135–145)
SYSTOLIC BLOOD PRESSURE - MUSE: NORMAL MMHG
T AXIS - MUSE: 76 DEGREES
TROPONIN T SERPL HS-MCNC: 13 NG/L
VENTRICULAR RATE- MUSE: 62 BPM
WBC # BLD AUTO: 5.5 10E3/UL (ref 4–11)

## 2024-03-18 PROCEDURE — 99291 CRITICAL CARE FIRST HOUR: CPT

## 2024-03-18 PROCEDURE — 85610 PROTHROMBIN TIME: CPT | Performed by: EMERGENCY MEDICINE

## 2024-03-18 PROCEDURE — 85025 COMPLETE CBC W/AUTO DIFF WBC: CPT | Performed by: EMERGENCY MEDICINE

## 2024-03-18 PROCEDURE — 70450 CT HEAD/BRAIN W/O DYE: CPT

## 2024-03-18 PROCEDURE — 250N000013 HC RX MED GY IP 250 OP 250 PS 637

## 2024-03-18 PROCEDURE — 258N000003 HC RX IP 258 OP 636: Performed by: EMERGENCY MEDICINE

## 2024-03-18 PROCEDURE — 36415 COLL VENOUS BLD VENIPUNCTURE: CPT | Performed by: EMERGENCY MEDICINE

## 2024-03-18 PROCEDURE — 70496 CT ANGIOGRAPHY HEAD: CPT

## 2024-03-18 PROCEDURE — 99222 1ST HOSP IP/OBS MODERATE 55: CPT | Performed by: INTERNAL MEDICINE

## 2024-03-18 PROCEDURE — A9585 GADOBUTROL INJECTION: HCPCS | Performed by: EMERGENCY MEDICINE

## 2024-03-18 PROCEDURE — G0378 HOSPITAL OBSERVATION PER HR: HCPCS

## 2024-03-18 PROCEDURE — 255N000002 HC RX 255 OP 636: Performed by: EMERGENCY MEDICINE

## 2024-03-18 PROCEDURE — 96361 HYDRATE IV INFUSION ADD-ON: CPT

## 2024-03-18 PROCEDURE — 85730 THROMBOPLASTIN TIME PARTIAL: CPT | Performed by: EMERGENCY MEDICINE

## 2024-03-18 PROCEDURE — 84484 ASSAY OF TROPONIN QUANT: CPT | Performed by: EMERGENCY MEDICINE

## 2024-03-18 PROCEDURE — 93005 ELECTROCARDIOGRAM TRACING: CPT

## 2024-03-18 PROCEDURE — 85610 PROTHROMBIN TIME: CPT | Performed by: INTERNAL MEDICINE

## 2024-03-18 PROCEDURE — 99285 EMERGENCY DEPT VISIT HI MDM: CPT | Mod: 25

## 2024-03-18 PROCEDURE — 70553 MRI BRAIN STEM W/O & W/DYE: CPT

## 2024-03-18 PROCEDURE — 80048 BASIC METABOLIC PNL TOTAL CA: CPT | Performed by: EMERGENCY MEDICINE

## 2024-03-18 PROCEDURE — 250N000011 HC RX IP 250 OP 636: Performed by: EMERGENCY MEDICINE

## 2024-03-18 PROCEDURE — 96360 HYDRATION IV INFUSION INIT: CPT

## 2024-03-18 PROCEDURE — 36415 COLL VENOUS BLD VENIPUNCTURE: CPT | Performed by: INTERNAL MEDICINE

## 2024-03-18 PROCEDURE — 250N000009 HC RX 250: Performed by: EMERGENCY MEDICINE

## 2024-03-18 RX ORDER — WARFARIN SODIUM 7.5 MG/1
7.5 TABLET ORAL ONCE
Status: COMPLETED | OUTPATIENT
Start: 2024-03-18 | End: 2024-03-18

## 2024-03-18 RX ORDER — ONDANSETRON 2 MG/ML
4 INJECTION INTRAMUSCULAR; INTRAVENOUS EVERY 6 HOURS PRN
Status: DISCONTINUED | OUTPATIENT
Start: 2024-03-18 | End: 2024-03-20 | Stop reason: HOSPADM

## 2024-03-18 RX ORDER — AMOXICILLIN 250 MG
2 CAPSULE ORAL 2 TIMES DAILY PRN
Status: DISCONTINUED | OUTPATIENT
Start: 2024-03-18 | End: 2024-03-20 | Stop reason: HOSPADM

## 2024-03-18 RX ORDER — ACETAMINOPHEN 325 MG/1
650 TABLET ORAL EVERY 4 HOURS PRN
Status: DISCONTINUED | OUTPATIENT
Start: 2024-03-18 | End: 2024-03-20 | Stop reason: HOSPADM

## 2024-03-18 RX ORDER — GADOBUTROL 604.72 MG/ML
14 INJECTION INTRAVENOUS ONCE
Status: COMPLETED | OUTPATIENT
Start: 2024-03-18 | End: 2024-03-18

## 2024-03-18 RX ORDER — IOPAMIDOL 755 MG/ML
67 INJECTION, SOLUTION INTRAVASCULAR ONCE
Status: COMPLETED | OUTPATIENT
Start: 2024-03-18 | End: 2024-03-18

## 2024-03-18 RX ORDER — AMOXICILLIN 250 MG
1 CAPSULE ORAL 2 TIMES DAILY PRN
Status: DISCONTINUED | OUTPATIENT
Start: 2024-03-18 | End: 2024-03-20 | Stop reason: HOSPADM

## 2024-03-18 RX ORDER — ONDANSETRON 4 MG/1
4 TABLET, ORALLY DISINTEGRATING ORAL EVERY 6 HOURS PRN
Status: DISCONTINUED | OUTPATIENT
Start: 2024-03-18 | End: 2024-03-20 | Stop reason: HOSPADM

## 2024-03-18 RX ORDER — WARFARIN SODIUM 5 MG/1
TABLET ORAL
Qty: 105 TABLET | Refills: 0 | Status: ON HOLD | OUTPATIENT
Start: 2024-03-18 | End: 2024-03-20

## 2024-03-18 RX ORDER — AZELASTINE 1 MG/ML
1 SPRAY, METERED NASAL DAILY
COMMUNITY
End: 2024-10-02

## 2024-03-18 RX ORDER — ACETAMINOPHEN 650 MG/1
650 SUPPOSITORY RECTAL EVERY 4 HOURS PRN
Status: DISCONTINUED | OUTPATIENT
Start: 2024-03-18 | End: 2024-03-20 | Stop reason: HOSPADM

## 2024-03-18 RX ADMIN — GADOBUTROL 14 ML: 604.72 INJECTION INTRAVENOUS at 16:03

## 2024-03-18 RX ADMIN — SODIUM CHLORIDE 500 ML: 9 INJECTION, SOLUTION INTRAVENOUS at 13:50

## 2024-03-18 RX ADMIN — WARFARIN SODIUM 7.5 MG: 7.5 TABLET ORAL at 22:42

## 2024-03-18 RX ADMIN — SODIUM CHLORIDE 100 ML: 9 INJECTION, SOLUTION INTRAVENOUS at 12:51

## 2024-03-18 RX ADMIN — IOPAMIDOL 67 ML: 755 INJECTION, SOLUTION INTRAVENOUS at 12:51

## 2024-03-18 ASSESSMENT — ACTIVITIES OF DAILY LIVING (ADL)
ADLS_ACUITY_SCORE: 39
ADLS_ACUITY_SCORE: 39
ADLS_ACUITY_SCORE: 40
ADLS_ACUITY_SCORE: 40
ADLS_ACUITY_SCORE: 39
ADLS_ACUITY_SCORE: 40
ADLS_ACUITY_SCORE: 39
ADLS_ACUITY_SCORE: 40
ADLS_ACUITY_SCORE: 39
ADLS_ACUITY_SCORE: 40
ADLS_ACUITY_SCORE: 39

## 2024-03-18 NOTE — TELEPHONE ENCOUNTER
ANTICOAGULATION MANAGEMENT:  Medication Refill    Anticoagulation Summary  As of 3/5/2024      Warfarin maintenance plan:  7.5 mg (5 mg x 1.5) every Tue, Sat; 5 mg (5 mg x 1) all other days   Next INR check:  4/2/2024   Target end date:  Indefinite    Indications    PAF (paroxysmal atrial fibrillation) (H) [I48.0]  Mechanical AV Replacement 1992 -- on Warfarin  [Z95.2]                 Anticoagulation Care Providers       Provider Role Specialty Phone number    Obdulio Ingram MD Referring Internal Medicine [phone]    Herve Morgan MD Referring Internal Medicine - Pediatrics [phone]            Refill Criteria    Visit with referring provider/group: Meets criteria: office visit within referring provider group in the last 1 year on 9/15/23    [facility] referral last signed: 05/02/2023; within last year: Yes    Lab monitoring not exceeding 2 weeks overdue: Yes    Nicko meets all criteria for refill. Rx instructions and quantity supplied updated to match patient's current dosing plan.  90 day supply with 0 refills granted per [facility] protocol     Lucina Andrew RN  Anticoagulation Clinic

## 2024-03-18 NOTE — PLAN OF CARE
Goal Outcome Evaluation:    Pt here with R cerebellum stroke. Just arrived to floor from ED within this hour. A&Ox4. Neuros intact ex slight L sided facial numbness. VSS on RA.  NPO diet. Up with SBA. Denies pain. Plan stroke workup.

## 2024-03-18 NOTE — H&P
"[facility]    History and Physical - Hospitalist Service       Date of Admission:  3/18/2024    Assessment & Plan      Todd S Aschoff is a 68 year old male with multiple medical problems including history of aortic dissection repair and mechanical aortic valve replacement in 1992 and paroxysmal atrial fibrillation on warfarin, chronic back pain/neuropathy, HTN, HLD, (dents to the emergency department with left facial numbness.  Stroke workup including head CT, CTAHN showed no acute changes.  Brain MRI has been recommended.  Patient is registered to observation.    Principal Problem:    Left Asad-Paresthesias  [place] to observation  See discussion in the stroke neurology note.  I appreciate Teresita Phipps's evaluation and recommendations.  They recommend brain MRI and further note, \"please page stroke neurology if infarct is identified for further recommendations.\"    Active Problems:    PAF (paroxysmal atrial fibrillation) (H)  Continue propafenone for ventricular rate/rhythm control  Continue warfarin for thromboembolism prevention, pharmacy to dose      Hypothyroidism  Continue thyroid hormone      Mechanical AV Replacement 1992 -- on Warfarin   INR is therapeutic 3/18  Continue warfarin, Pharm.D. to dose      Memory difficulties  Continue Aricept      Depression, anxiety  Continue PTA Effexor, guanfacine, Valium       Chronic pain  Continue PTA Lyrica      Hx of CVA x 4, 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  See discussion above           Diet: NPO for Medical/Clinical Reasons Except for: No Exceptions    DVT Prophylaxis: Warfarin  Zimmer Catheter: Not present  Lines: None     Cardiac Monitoring: None  Code Status:  Full    Clinically Significant Risk Factors Present on Admission               # Drug Induced Coagulation Defect: home medication list includes an anticoagulant medication    # Hypertension: Noted on problem list      # Severe Obesity: Estimated body mass index is 44.49 kg/m  as calculated " "from the following:    Height as of 3/13/24: 1.803 m (5' 11\").    Weight as of 3/13/24: 144.7 kg (319 lb).              Disposition Plan      Expected Discharge Date: 03/19/2024                  Cindy Reyes MD  Hospitalist Service  [facility]  Securely message with [employer] (more info)  Text page via [facility] Paging/Directory     ______________________________________________________________________    Chief Complaint   \"It's (left side) still numb, but it's better.\"        History of Present Illness   Todd S Aschoff is a 68 year old male  with multiple medical problems including history of aortic dissection repair and mechanical aortic valve replacement in 1992 and paroxysmal atrial fibrillation on warfarin, chronic back pain/neuropathy, HTN, HLD, who presents to the emergency department with left facial numbness.     See discussion in ED note, patient felt well when he went to bed.  At 0730 and had decaf coffee, went back to sleep.  When he woke again at 10:00 he had numbness on the left side of his face, this also spread up into the forehead and by the ear.  Because symptoms persisted, he called paramedics.    He says that as he was walking to the stretcher, he felt dizzy and lightheaded.  He felt the numbness was extending all the way down his left side \"to my waist.\"  He has chronic lower extremity neuropathy and did not notice any change in his gait.  He had no difficulty talking or thinking.    In the emergency department, tier 1 stroke code was called. Head CT, CTAHN showed no acute changes.  Brain MRI has been recommended.  Patient is registered to observation.      Past Medical History    Past Medical History:   Diagnosis Date    Advanced directives, counseling/discussion 1/6/2012    Discussed Advance Directive planning with patient; information given to patient to review.    Alcohol dependence (H)     Alcohol dependence in remission (H) 8/2/2018    Allergy, unspecified not " elsewhere classified     seasonal    Anemia, unspecified type 1/22/2021    Aortic valve prosthesis present 2/8/2021    Atrial fibrillation (H)     Benign prostatic hyperplasia 2/8/2021    Bilateral thoracic back pain 11/16/2016    BPH (benign prostatic hypertrophy)     Chronic atrial fibrillation (H) 8/7/2002    Chronic infection     low back wound incision not healing     Chronic low back pain 8/19/2011    Chronic pain     lower back and right leg and left leg    Depressive disorder 6/9/2010    Depression  NOS    Dissection of aorta, thoracic (H) 1992    [employer] Jose F aotic valve + arch graft 1992    Elevated prostate specific antigen (PSA) 1/22/2020    Family history of prostate cancer 1/22/2020    Generalized muscle weakness 1/22/2021    Headache(784.0)     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 Provider to review    Leg wound, left 1/9/2021    Long term current use of anticoagulant therapy 8/7/2002    LW Modifier:  Coumadin, since mechanical aortic valve LW Onset:  1992 ; Anticoagulant Rx Long Term Problem list name updated by automated process. Provider to review    Low back pain     Lumbar radiculopathy 4/3/2013    Lumbar surgical wound fluid collection 5/23/2013    Major depression     Memory difficulties 1/16/2015    Mixed hyperlipidemia     Morbid obesity (H) 4/20/2010    LW Modifier:  BMI 41.3 (Apr 2010) ; Obesity Morbid    Numbness and tingling     right leg post surg rightt hip/ also left leg since surg    OA (osteoarthritis)     hips    OAB (overactive bladder) 5/11/2015    Obesity, unspecified     Persons encountering health services in other specified circumstances 4/3/2012    Formatting of this note might be different from the original. EMERGENCY CARE  PLAN Presenting Problem Signs and Symptoms Treatment Plan   Questions or conerns during clinic hours    I will call the clinic directly    Questions or conerns outside clinic hours    I will call the 24 hour nurse line at [phone]   Patient needs to schedule an appointment    I will call the 24 hour scheduling team at    Polypharmacy 1/22/2021    Prostate infection     Radiculitis 4/20/2010    LW Modifier:  Right foot, s/p R AMANDA LW Onset:  2002 ; Neuralgia    Recurrent major depressive episodes, in full remission (H24) 10/30/2012    Renal insufficiency 1/22/2021    Rotator cuff tear 1/26/2015    Rotator cuff tear, right    S/P lumbar fusion 4/5/2018    S/P 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 Jose F's valve    APPLY WOUND VAC Left 1/26/2021    Procedure: Placement of negative pressure wound therapy 2,400 squared centimeters  ;  Surgeon: Lazaro Plascencia MD;  Location: [facility] OR    CATARACT IOL, RT/LT      rt eye only    CHOLECYSTECTOMY, LAPOROSCOPIC  8/10    CLOSE SECONDARY WOUND LOWER EXTREMITY Left 2/3/2021    Procedure:  Split Thickness Skin Graft to Leg of 18 square centimeters  Intermediate Closure of Leg of 8cm   ;  Surgeon: Lazaro Plascencia MD;  Location: [facility] OR    COLONOSCOPY N/A 1/15/2015    Procedure: COLONOSCOPY;  Surgeon: Johnson Kothari MD;  Location: [facility] GI    DECOMPRESSION LUMBAR ONE LEVEL  4/3/2013    Procedure: DECOMPRESSION LUMBAR ONE LEVEL;  Open Decompression L3-4 bilateral;  Surgeon: Khalif Casas MD;   Location: [facility] OR    DECOMPRESSION, FUSION CERVICAL ANTERIOR ONE LEVEL, COMBINED  3/23/2012    Procedure:COMBINED DECOMPRESSION, FUSION CERVICAL ANTERIOR ONE LEVEL; Anterior Cervical Decompression and Fusion C4-6; Surgeon:KHALIF COFFEY; Location:[facility] OR    ELBOW SURGERY      EXPLORE SPINE, REMOVE HARDWARE, COMBINED  5/23/2013    Procedure: COMBINED EXPLORE SPINE, REMOVE HARDWARE;  Exploration Lumbar Wound for fluid collection;  Surgeon: Khalif Coffey MD;  Location: [facility] OR    FUSION CERVICAL ANTERIOR TWO LEVELS  3/26/2012    Procedure:FUSION CERVICAL ANTERIOR TWO LEVELS; Anterior Cervical Fusion C4-6, Anterior Cervical  Hematoma Evacuation; Surgeon:KHALIF COFFEY; Location:[facility] OR    IR LUMBAR EPIDURAL STEROID INJECTION  3/28/2003    IRRIGATION AND DEBRIDEMENT LOWER EXTREMITY, COMBINED Left 1/10/2021    Procedure: 1.  Irrigation and excisional debridement to muscle left distal medial calf chronic wounds total area measuring 9 cm x 4 cm 2.  Irrigation and excisional debridement to fascia left medial calf chronic hematoma measuring 29 x 6.7 x 4.2 cm 3.  Primary complex wound closure left medial distal calf 9 cm in length;  Surgeon: Rod Kemp MD;  Location: [facility] OR    IRRIGATION AND DEBRIDEMENT LOWER EXTREMITY, COMBINED Left 1/26/2021    Procedure: Evacuation of hematoma debridement of skin, subcutaneous tissue and muscle 2,400 squared centimeters, placement of negative pressure wound therapy 2,400 squared centimeters;  Surgeon: Lazaro Plascencia MD;  Location: [facility] OR    IRRIGATION AND DEBRIDEMENT SPINE, CLOSE WOUND, COMBINED  3/26/2012    Procedure:COMBINED IRRIGATION AND DEBRIDEMENT SPINE, CLOSE WOUND; Surgeon:KHALIF COFFEY; Location:[facility] OR    OTHER SURGICAL HISTORY      IL UNLISTED ORBIT    OTHER SURGICAL HISTORY      IL UNLISTED SPINE    OTHER SURGICAL HISTORY      IL UNLISTED NECK/THORAX    OTHER SURGICAL HISTORY      (IA) IL TOTAL HIP ARTHROPLASTY    OTHER SURGICAL HISTORY      (IA) IL NEE  SCOPE MED W LAT MENISCECT WWO DEBRIBE/SHAVE ANY CO    removal of cyst of back   2.5week    TONSILLECTOMY      wisdom teeth[      [facility] NONSPECIFIC PROCEDURE  1992    repair of TAA with graft    [facility] TOTAL HIP ARTHROPLASTY  2010    Left AMANDA    [facility] TOTAL HIP ARTHROPLASTY  2002    R hip replacement comp's by nerve injury with pain down into R leg, nadya below knee       Prior to Admission Medications   Prior to Admission Medications   Prescriptions Last Dose Informant Patient Reported? Taking?   acetaminophen (TYLENOL) 500 MG tablet  Care Giver Yes No   Sig: Take 1,000 mg by mouth 2 times daily   atorvastatin (LIPITOR) 40 MG tablet   No No   Sig: Take 1 tablet (40 mg) by mouth every evening   azelastine-fluticasone (DYMISTA) 137-50 MCG/ACT nasal spray   Yes No   Sig: Spray 1 spray into both nostrils daily   cyclobenzaprine (FLEXERIL) 10 MG tablet   No No   Sig: TAKE ONE TABLET BY MOUTH THREE TIMES DAILY AS NEEDED FOR MUSCLE SPASM.   diazepam (VALIUM) 5 MG tablet   No No   Sig: Take 1 tablet by mouth nightly as needed for anxiety   donepezil (ARICEPT) 10 MG tablet   No No   Sig: Take 1 tablet (10 mg) by mouth At Bedtime   folic acid 5 MG CAPS   Yes No   Sig: Take 5 mg by mouth daily   guanFACINE (TENEX) 1 MG tablet   No No   Sig: Take 1 tablet (1 mg) by mouth At Bedtime   levothyroxine (SYNTHROID/LEVOTHROID) 175 MCG tablet   No No   Sig: Take 1 tablet (175 mcg) by mouth daily   lisinopril (ZESTRIL) 10 MG tablet   No No   Sig: Take 1 tablet (10 mg) by mouth daily   mirabegron (MYRBETRIQ) 50 MG 24 hr tablet   No No   Sig: Take 1 tablet (50 mg) by mouth daily   pregabalin (LYRICA) 100 MG capsule   No No   Sig: Take 1 capsule (100 mg) by mouth 3 times daily Do not take if sleepy/sedated.   propafenone (RYTHMOL) 150 MG TABS tablet   No No   Sig: Take 1 tablet (150 mg) by mouth every 8 hours   venlafaxine (EFFEXOR XR) 75 MG 24 hr capsule   No No   Sig: Take 3 capsules (225 mg) by mouth daily   warfarin ANTICOAGULANT (COUMADIN) 5  MG tablet   No No   Sig: Take 1.5 tablets (7.5 mg) by mouth every Tue, Sat; Take 1 tablet (5 mg) by mouth all other days of the week Or as directed.      Facility-Administered Medications Last Administration Doses Remaining   dexamethasone (DECADRON) injection 2 mL 5/11/2022 11:32 AM               Physical Exam   Vital Signs: Temp: 97.4  F (36.3  C) Temp src: Temporal BP: 115/68 Pulse: 60   Resp: 16 SpO2: 97 % O2 Device: None (Room air)    Weight: 0 lbs 0 oz    Constitutional: Awake, alert, cooperative, no apparent distress  HEENT: Face is symmetric.  Extraocular movements are intact.  Respiratory: Clear to auscultation bilaterally, no crackles or wheezing  Cardiovascular: Regular rate and rhythm   GI: Normal bowel sounds, soft, non-distended, non-tender  Skin/Integumen: No rash on exposed skin.  No lower extremity edema  Other: Mood is pleasant      Medical Decision Making       65 MINUTES SPENT BY ME on the date of service doing chart review, history, exam, documentation & further activities per the note.      Data     I have personally reviewed the following data over the past 24 hrs:    5.5  \   15.8   / 209     141 103 17.6 /  125 (H)   4.4 29 1.01 \     Trop: 13 BNP: N/A     INR:  2.22 (H) PTT:  36   D-dimer:  N/A Fibrinogen:  N/A       Imaging results reviewed over the past 24 hrs:   Recent Results (from the past 24 hour(s))   CT Head w/o Contrast    Narrative    EXAM: CT HEAD W/O CONTRAST  3/18/2024 12:54 PM     HISTORY:  Code Stroke to evaluate for potential thrombolysis and  thrombectomy. PLEASE READ IMMEDIATELY.       COMPARISON:  No prior similar studies    TECHNIQUE: Using multidetector thin collimation helical acquisition  technique, axial, coronal and sagittal CT images from the skull base  to the vertex were obtained without intravenous contrast.   (topogram) image(s) also obtained and reviewed. Dose reduction  techniques were performed.    FINDINGS:  No intracranial hemorrhage, mass effect, or  midline shift. No acute  loss of gray-white matter differentiation in the cerebral hemispheres.  Ventricles are proportionate to the cerebral sulci. Clear basal  cisterns. Chronic infarcts at the right temporo-occipital junction and  bilateral cerebellar hemispheres.     The bony calvaria and the bones of the skull base are normal. The  visualized portions of the paranasal sinuses and mastoid air cells are  clear. Grossly normal orbits.       Impression    IMPRESSION:   1. No acute intracranial pathology.   2. Chronic infarcts in bilateral cerebellar hemispheres and at the  right tempo-occipital junction.    JOAO SCHULTZ MD         SYSTEM ID:  [id number]   CTA Head Neck with Contrast    Narrative    EXAM: CTA HEAD NECK W CONTRAST  3/18/2024 12:58 PM     HISTORY:  Code Stroke to evaluate for potential thrombolysis and  thrombectomy. PLEASE READ IMMEDIATELY.       COMPARISON:  CT 10/31/2020    TECHNIQUE:    HEAD and NECK CTA: During rapid bolus intravenous injection of  nonionic contrast material, axial images were obtained using thin  collimation multidetector helical technique from the base of the upper  aortic arch through the [place] of [place]. This CT angiogram data was  reconstructed at thin intervals with mild overlap. Images were sent to  the 3D workstation, and 3D reconstructions were obtained. The axial  source images, multiplanar reformations, 3D reconstructions in both  maximum intensity projection display and volume rendered models were  reviewed, with reconstructions performed by the technologist and the  radiologist.    CONTRAST: 67 mL Isovue-370    FINDINGS:    Head CTA demonstrates no intracranial arterial aneurysm or large  vessel occlusion. Unchanged focal moderate stenosis in the P3 segment  of the right PCA.    Neck CTA demonstrates no internal carotid artery stenosis. No  vertebral artery stenosis. Patent and conventional aortic arch  branching pattern.    No acute finding in the visualized  neck soft tissues, or in the  superior mediastinum/thorax. Postsurgical changes of instrumented  anterior spinal fusion C4-C6.      Impression    IMPRESSION:    1. Head CTA demonstrates no aneurysm or large vessel occlusion.  Unchanged focal moderate stenosis in the P3 segment of the right PCA.  2. Neck CTA demonstrates no stenosis of the major cervical arteries.    JOAO SCHULTZ MD         SYSTEM ID:  [id number]      Xolair Pregnancy And Lactation Text: This medication is Pregnancy Category B and is considered safe during pregnancy. This medication is excreted in breast milk.

## 2024-03-18 NOTE — PROGRESS NOTES
RECEIVING UNIT ED HANDOFF REVIEW    ED Nurse Handoff Report was reviewed by: Cornelia Barbosa RN on March 18, 2024 at 5:19 PM

## 2024-03-18 NOTE — ED TRIAGE NOTES
Left sided facial numbness, Pt reports at baseline at 0730, went back to sleep, pt reports woke up at 1000am, noted left facial numbness, left sided headache over the eyebrow, numbness to the left ear. Hx of multiple strokes, on Warfarin. Hx of aortic valve replacement.      Triage Assessment (Adult)       Row Name 03/18/24 1225          Triage Assessment    Airway WDL WDL        Cognitive/Neuro/Behavioral WDL    Level of Consciousness alert     Arousal Level opens eyes spontaneously     Orientation oriented x 4

## 2024-03-18 NOTE — PHARMACY-ADMISSION MEDICATION HISTORY
Pharmacist Admission Medication History    Admission medication history is complete. The information provided in this note is only as accurate as the sources available at the time of the update.    Information Source(s): Patient and CareEverywhere/SureScripts via in-person    Pertinent Information:     Changes made to PTA medication list:  Added: None  Deleted: None  Changed: diazepam (PRN to at bedtime)     Allergies reviewed with patient and updates made in EHR: no    Medication History Completed By: JANET ZIMMERMAN RP 3/18/2024 2:48 PM    PTA Med List   Medication Sig Last Dose    acetaminophen (TYLENOL) 500 MG tablet Take 1,000 mg by mouth 2 times daily Past Week    atorvastatin (LIPITOR) 40 MG tablet Take 1 tablet (40 mg) by mouth every evening 3/17/2024    azelastine (ASTELIN) 0.1 % nasal spray Spray 1 spray into both nostrils daily 3/18/2024    cyclobenzaprine (FLEXERIL) 10 MG tablet TAKE ONE TABLET BY MOUTH THREE TIMES DAILY AS NEEDED FOR MUSCLE SPASM. Past Week    diazepam (VALIUM) 5 MG tablet Take 1 tablet by mouth nightly as needed for anxiety (Patient taking differently: Take 5 mg by mouth at bedtime) 3/17/2024    donepezil (ARICEPT) 10 MG tablet Take 1 tablet (10 mg) by mouth At Bedtime 3/17/2024    folic acid (FOLVITE) 400 MCG tablet Take 400 mcg by mouth daily 3/18/2024    guanFACINE (TENEX) 1 MG tablet Take 1 tablet (1 mg) by mouth At Bedtime 3/17/2024    levothyroxine (SYNTHROID/LEVOTHROID) 175 MCG tablet Take 1 tablet (175 mcg) by mouth daily 3/18/2024    lisinopril (ZESTRIL) 10 MG tablet Take 1 tablet (10 mg) by mouth daily 3/18/2024    mirabegron (MYRBETRIQ) 50 MG 24 hr tablet Take 1 tablet (50 mg) by mouth daily 3/18/2024    pregabalin (LYRICA) 100 MG capsule Take 1 capsule (100 mg) by mouth 3 times daily Do not take if sleepy/sedated. 3/18/2024    propafenone (RYTHMOL) 150 MG TABS tablet Take 1 tablet (150 mg) by mouth every 8 hours 3/18/2024    venlafaxine (EFFEXOR XR) 75 MG 24 hr capsule  Take 3 capsules (225 mg) by mouth daily 3/18/2024    warfarin ANTICOAGULANT (COUMADIN) 5 MG tablet Take 1.5 tablets (7.5 mg) by mouth every Tue, Sat; Take 1 tablet (5 mg) by mouth all other days of the week Or as directed. 3/17/2024 at PM

## 2024-03-18 NOTE — ED NOTES
"[facility]  ED Nurse Handoff Report    ED Chief complaint: Numbness      ED Diagnosis:   Final diagnoses:   Paresthesias       Code Status: Full Code    Allergies:   Allergies   Allergen Reactions    Blood Transfusion Related (Informational Only) Other (See Comments)     Patient has a history of a clinically significant antibody against RBC antigens.  A delay in compatible RBCs may occur.      Ciprofloxacin      \"Went nuts\"    Gabapentin      Severe behavioral disturbances       Patient Story:   68 year old male with a past medical history significant for atrial fibrillation (on warfarin), mechanical aortic valve, thoracic aortic dissection, HLD, depression, history of stroke. LKW was 7 AM this morning.  He then went and had a couple coffee and then fell back asleep.  He awoke again at 10 AM with new left ear numbness that then spread to include the left side of his face and left scalp.  The numbnesshas has been persisting and progressed to include the left truncal area.  He denies any left arm or leg numbness, unilateral weakness, speech changes, vision changes.  Then while en route to the emergency department he developed a headache.  The headache originally localized to the frontal area, but then migrated to the left side of his head     Focused Assessment:    Neuro: Alert, oriented x 4  Respiratory:Clear lung sounds, on room air   Cardiology:  no CP, VS WDL   Gastrointestinal: soft, non tender, morbidly obese  Genitourinary/Renal:    Musculoskeletal: moves all extremities   Skin: Intact skin   Lines: 18 R AC    Labs Ordered and Resulted from Time of ED Arrival to Time of ED Departure   BASIC METABOLIC PANEL - Abnormal       Result Value    Sodium 141      Potassium 4.4      Chloride 103      Carbon Dioxide (CO2) 29      Anion Gap 9      Urea Nitrogen 17.6      Creatinine 1.01      GFR Estimate 82      Calcium 9.7      Glucose 125 (*)    INR - Abnormal    INR 2.22 (*)    PARTIAL THROMBOPLASTIN " TIME - Normal    aPTT 36     TROPONIN T, HIGH SENSITIVITY - Normal    Troponin T, High Sensitivity 13     CBC WITH PLATELETS AND DIFFERENTIAL    WBC Count 5.5      RBC Count 4.85      Hemoglobin 15.8      Hematocrit 46.6      MCV 96      MCH 32.6      MCHC 33.9      RDW 13.2      Platelet Count 209      % Neutrophils 56      % Lymphocytes 28      % Monocytes 10      % Eosinophils 5      % Basophils 1      % Immature Granulocytes 0      NRBCs per 100 WBC 0      Absolute Neutrophils 3.1      Absolute Lymphocytes 1.6      Absolute Monocytes 0.6      Absolute Eosinophils 0.3      Absolute Basophils 0.1      Absolute Immature Granulocytes 0.0      Absolute NRBCs 0.0     GLUCOSE MONITOR NURSING POCT        CTA Head Neck with Contrast   Final Result   IMPRESSION:     1. Head CTA demonstrates no aneurysm or large vessel occlusion.   Unchanged focal moderate stenosis in the P3 segment of the right PCA.   2. Neck CTA demonstrates no stenosis of the major cervical arteries.      JOAO SCHULTZ MD            SYSTEM ID:  [id number]      CT Head w/o Contrast   Final Result   IMPRESSION:    1. No acute intracranial pathology.    2. Chronic infarcts in bilateral cerebellar hemispheres and at the   right tempo-occipital junction.      JOAO SCHULTZ MD            SYSTEM ID:  [id number]      MR Brain w/o & w Contrast    (Results Pending)         Treatments and/or interventions provided:      Medications   sodium chloride 0.9% BOLUS 500 mL (500 mLs Intravenous $New Bag 3/18/24 1350)   iopamidol (ISOVUE-370) solution 67 mL (67 mLs Intravenous $Given 3/18/24 1251)   100mL Saline FLush (100 mLs As instructed $Given 3/18/24 1251)        Patient's response to treatments and/or interventions:   Resting comfortably    To be done/followed up on inpatient unit:    See any in-patient orders    Does this patient have any cognitive concerns?: na    Activity level - Baseline/Home:    Independent    Activity Level - Current:      Independent    Patient's Preferred language: English     Needed?: No    Isolation: Contact   Infection: MRSA  Patient tested for COVID 19 prior to admission: NO    Bariatric?: Yes    Vital Signs:   Vitals:    03/18/24 1227 03/18/24 1300 03/18/24 1345 03/18/24 1350   BP: 105/60 100/72 98/69 115/68   BP Location:    Right arm   Patient Position:    Supine   Pulse: 63  60    Resp: 16  16 16   Temp: 97.4  F (36.3  C)      TempSrc: Temporal      SpO2: 97% 98% 96% 97%       Cardiac Rhythm:     Was the PSS-3 completed:   Yes    Family Comments: none present    For the majority of the shift this patient's behavior was Green.   Behavioral interventions performed were     ED NURSE PHONE NUMBER: *[phone]

## 2024-03-18 NOTE — ED PROVIDER NOTES
History     Chief Complaint:  Numbness       The history is provided by the patient and the EMS personnel.      Todd S Aschoff is a 68 year old male with a history of TAVR, atrial fibrillation, and multiple strokes, anticoagulated on Warfarin, who presents due to facial numbness. The patient reports that he went to bed last night at 2200 with no symptoms. He woke today at 0730 and drank a cup of decaf coffee without symptoms. He fell back asleep and woke again at 1000 with left sided facial numbness and paresthesias. He notes that he first noticed left ear numbness and paresthesias that was present up to his forehead and lasted for 2 hours. 15 minutes ago, the patient notes onset of headache above his left eye. He called EMS who brought him to the ED. EMS notes normal vitals. Currently, the patient reports no change in symptoms and prior left sided hearing loss due to previous stroke. The patient denies abdominal pain, chest pain, numbness or paresthesias in extremities, back pain, or extremity weakness. He also denies recent falls.     Independent Historian:   EMS - They report supplemental history as noted above.     Review of External Notes:   Discharge summary from 11-23 from       Medications:    Lipitor   Valium   Aricept   Tenex   Levothyroxine   Lisinopril   Myrbetriq   Lyrica   Rhythmol   Effexor   Warfarin     Past Medical History:    Alcohol dependence    Anemia   Atrial fibrillation   BPH   Depression   Aortic dissection   Spinal cord injury   Hyperlipidemia   Hypertension  Hypothyroidism   Lumbar radiculopathy   Depression   Osteoarthritis   Sciatica   Duane syndrome   Suicide attempt   Tourette syndrome   Right temporal lobe infarction  Epilepsy      Past Surgical History:    TAVR   Apply wound vac   Cataract removal   Cholecystectomy   Close secondary wound   Colonoscopy   Decompression lumbar   Decompression cervical   Elbow surgery   Explore spine and removal of hardware   Cervical anterior  fusion   Lumbar epidural IR   I&D  x3   Orbital surgery   Total hip arthroplasty   Knee arthroscopy   Tonsillectomy   Ludlow teeth removal   TAA repair     Physical Exam   Patient Vitals for the past 24 hrs:   BP Temp Temp src Pulse Resp SpO2   03/18/24 1515 118/79 -- -- 66 -- 99 %   03/18/24 1400 105/76 -- -- 62 16 96 %   03/18/24 1350 115/68 -- -- -- 16 97 %   03/18/24 1345 98/69 -- -- 60 16 96 %   03/18/24 1300 100/72 -- -- -- -- 98 %   03/18/24 1227 105/60 97.4  F (36.3  C) Temporal 63 16 97 %        Physical Exam  General:  Sitting on bed.  HENT:  No obvious trauma to head  Right Ear:  External ear normal.   Left Ear:  External ear normal.   Nose:  Nose normal.   Eyes:  Conjunctivae and EOM are normal. Pupils are equal, round, and reactive.   Neck: Normal range of motion. Neck supple. No tracheal deviation present.   CV:  Normal heart sounds. No murmur heard.  Pulm/Chest: Effort normal and breath sounds normal.   Abd: Soft. No distension. There is no tenderness. There is no rigidity, no rebound and no guarding.   M/S: Normal range of motion.   Neuro: Awake and alert. CN II-XII Grossly intact other than disconjugate gaze with right lateral rectus nerve palsy which patient reports is baseline, no pronator drift, normal finger-nose-finger, visual fields intact by confrontation. Muscle strength is +5 proximal and distal in the bilateral upper and lower extremities. No dysarthria. Normal palm up, palm down.  Skin: Skin is warm and dry. Not diaphoretic. No rash of shingles to the left side of the face.   Psych: Normal mood and affect. Behavior is normal.     Emergency Department Course   ECG  ECG results from 03/18/24   EKG 12-lead, tracing only     Value    Systolic Blood Pressure     Diastolic Blood Pressure     Ventricular Rate 62    Atrial Rate 62    WV Interval 190    QRS Duration 100        QTc 416    P Axis 64    R AXIS -42    T Axis 76    Interpretation ECG      Sinus rhythm with Premature atrial  complexes  Left axis deviation  Abnormal ECG  When compared with ECG of 03-NOV-2023 16:15,  Premature atrial complexes are now Present  Borderline criteria for Anterolateral infarct are no longer Present       Imaging:  CTA Head Neck with Contrast   Final Result   IMPRESSION:     1. Head CTA demonstrates no aneurysm or large vessel occlusion.   Unchanged focal moderate stenosis in the P3 segment of the right PCA.   2. Neck CTA demonstrates no stenosis of the major cervical arteries.      OJAO SCHULTZ MD            SYSTEM ID:  [id number]      CT Head w/o Contrast   Final Result   IMPRESSION:    1. No acute intracranial pathology.    2. Chronic infarcts in bilateral cerebellar hemispheres and at the   right tempo-occipital junction.      JOAO SCHULTZ MD            SYSTEM ID:  [id number]      MR Brain w/o & w Contrast    (Results Pending)   Report per radiology.        Laboratory:  Labs Ordered and Resulted from Time of ED Arrival to Time of ED Departure   BASIC METABOLIC PANEL - Abnormal       Result Value    Sodium 141      Potassium 4.4      Chloride 103      Carbon Dioxide (CO2) 29      Anion Gap 9      Urea Nitrogen 17.6      Creatinine 1.01      GFR Estimate 82      Calcium 9.7      Glucose 125 (*)    INR - Abnormal    INR 2.22 (*)    PARTIAL THROMBOPLASTIN TIME - Normal    aPTT 36     TROPONIN T, HIGH SENSITIVITY - Normal    Troponin T, High Sensitivity 13     CBC WITH PLATELETS AND DIFFERENTIAL    WBC Count 5.5      RBC Count 4.85      Hemoglobin 15.8      Hematocrit 46.6      MCV 96      MCH 32.6      MCHC 33.9      RDW 13.2      Platelet Count 209      % Neutrophils 56      % Lymphocytes 28      % Monocytes 10      % Eosinophils 5      % Basophils 1      % Immature Granulocytes 0      NRBCs per 100 WBC 0      Absolute Neutrophils 3.1      Absolute Lymphocytes 1.6      Absolute Monocytes 0.6      Absolute Eosinophils 0.3      Absolute Basophils 0.1      Absolute Immature Granulocytes 0.0      Absolute  NRBCs 0.0     GLUCOSE MONITOR NURSING POCT      Emergency Department Course & Assessments:    Interventions:  Medications   iopamidol (ISOVUE-370) solution 67 mL (67 mLs Intravenous $Given 3/18/24 1251)   100mL Saline FLush (100 mLs As instructed $Given 3/18/24 1251)   sodium chloride 0.9% BOLUS 500 mL (500 mLs Intravenous $New Bag 3/18/24 1350)        Assessments:  1224 I obtained history and examined the patient as noted above.   1228  I called a Tier one code stroke.   1236  I updated the nurse and patient. He has slight paresthesias to his left upper trunk.   1339 I rechecked the patient. He reports that his paresthesias are unchanged. He continues to have no back pain or chest pain. We discussed plan for admission and the patient is comfortable with this plan.     Independent Interpretation (X-rays, CTs, rhythm strip):  Bedside cardiac monitor rhythm strip shows a sinus rhythm    Consultations/Discussion of Management or Tests:  1234 I spoke with Teresita Phipps PA-C, Stroke/Neurology, regarding the patient's history and presentation in the emergency department today.  1302 I spoke with neuro-radiology, regarding the patient's imaging results.   1306 I spoke with Teresita Phipps PA-C, Stroke/Neurology, regarding the patient's history and presentation in the emergency department today. She informed me of de-escalation of stroke code.   1354 I spoke with Dr. Reyes of the hospitalist team regarding the patient, who accepted the patient for admission.       Social Determinants of Health affecting care:   None    Disposition:  The patient was admitted to the hospital under the care of Dr. Reyes.     Impression & Plan    CMS Diagnoses:  The patient has stroke symptoms:         ED Stroke specific documentation           NIHSS PDF     Patient last known well time: 0700  ED Provider first to bedside at: 1224  CT Results received at: 1302    Thrombolytics:   Not given due to:   - minor/isolated/quickly  resolving symptoms    If treating with thrombolytics: Ensure SBP<180 and DBP<105 prior to treatment with thrombolytics.  Administering thrombolytics after treatment with IV labetalol, hydralazine, or nicardipine is reasonable once BP control is established.    Endovascular Retrieval:  Not initiated due to absence of proximal vessel occlusion    [employer] Stroke Scale (Baseline)  Time Performed: 1224     Score    Level of consciousness: (0)   Alert, keenly responsive    LOC questions: (0)   Answers both questions correctly    LOC commands: (0)   Performs both tasks correctly    Best gaze: (0)   Normal    Visual: (0)   No visual loss    Facial palsy: (0)   Normal symmetrical movements    Motor arm (left): (0)   No drift    Motor arm (right): (0)   No drift    Motor leg (left): (0)   No drift    Motor leg (right): (0)   No drift    Limb ataxia: (0)   Absent    Sensory: (0)   Normal- no sensory loss    Best language: (0)   Normal- no aphasia    Dysarthria: (0)   Normal    Extinction and inattention: (0)   No abnormality        Total Score:  0        Stroke Mimics were considered (including migraine headache, seizure disorder, hypoglycemia (or hyperglycemia), head or spinal trauma, CNS infection, Toxin ingestion and shock state (e.g. sepsis) .    Medical Decision Making:  Todd S Aschoff is a very pleasant 68 year old year old patient who presents to the emergency department with concern of paresthesias to the left side of the face.  He has had strokes before.  He has a mechanical valve and is on warfarin.  INR is therapeutic.  A code stroke was called given his history of strokes.  Fortunately the imaging is unremarkable.  There is no intracranial hemorrhage.  No stroke as allowed by CT.  No large vessel occlusion.  Stroke neurology de-escalated and recommended against TNK.  Remainder of the labs are unremarkable.  He is in a sinus rhythm.  He denied any chest pain or back pain so doubt thoracic  aortic aneurysm rupture or dissection.  Screening EKG did not have any acute ST changes.  Troponin is negative.  Doubt ACS.  There is no rash of shingles.  There are no signs of Bell's palsy.  I discussed these are both within the differential.  Given his extensive history, stroke neurology recommended MRI and observation admission.  MRI was ordered.  I spoke to the hospitalist as above who has agreed to admit the patient for continued evaluation and treatment.  Patient is hemodynamically stable.  He is afebrile.  I doubt infectious symptoms.  The patient is in agreement with this treatment plan    Diagnosis:    ICD-10-CM    1. Paresthesias  R20.2          Scribe Disclosure:  I, Naty Abraham, am serving as a scribe at 12:40 PM on 3/18/2024 to document services personally performed by Garland Carter DO based on my observations and the provider's statements to me.     3/18/2024   Garland Carter DO Anderson, Robert James, DO  03/18/24 1539

## 2024-03-18 NOTE — CONSULTS
[facility]    Stroke Consult Note    Reason for Consult: Stroke Code     Chief Complaint: Numbness    HPI  Todd S Aschoff is a 68 year old male with a past medical history significant for atrial fibrillation (on warfarin), mechanical aortic valve, thoracic aortic dissection, HLD, depression, history of stroke. LKW was 7 AM this morning when tired woke up.  He then went and had a couple coffee and then fell back asleep.  He awoke again at 10 AM with new left ear numbness that then spread to include the left side of his face and left scalp.  The numbnesshas has been persisting and progressed to include the left truncal area.  He denies any left arm or leg numbness, unilateral weakness, speech changes, vision changes.  Then while en route to the emergency department he developed a headache.  The headache originally localized to the frontal area, but then migrated to the left side of his head.  Nicko denies similar episodes of left facial numbness.  Of note, Nicko's most recent stroke presented with hearing loss.  He currently takes warfarin 5 mg on MWThF and 7.5 mg on Sat/Tues. Nicko reports taking his warfarin yesterday evening.  Presenting blood pressure was 105/60. INR 2.22.     Imaging Findings  CT head: No evidence of hemorrhage or acute intracranial pathology.  Chronic infarcts of the bilateral cerebellar hemispheres.  CTA Head/Neck: No proximal large vessel occlusion.  Moderate focal stenosis in the P3 segment of the right PCA.  No other significant stenosis.    Intravenous Thrombolysis  Not given due to:   - minor/isolated/quickly resolving symptoms, NIHSS 2  - DOAC dose within 48 hours or INR > 1.7, INR 2.22    Endovascular Treatment  Not initiated due to absence of proximal vessel occlusion    Impression   Left ear, facial, scalp numbness with progressing left trunk numbness and associated headache, unclear etiology.  Will assess for acute infarct with MRI  "brain.    Recommendations  - Brain MRI with and without contrast; please page stroke neurology if infarct is identified for further recommendations    UPDATE:  MRI Brain: Small recent ischemic infarcts of the lateral right cerebellum, bilateral cortical parietal lobe infarcts     Updated Recommendations  - Use orderset: \"Ischemic Stroke Routine Admission\" or \"Ischemic Stroke No Thrombolytics/No Thrombectomy ICU Admission\"  - Neurochecks and Vital Signs every 4 hours  - Continue Warfarin, INR goal 2.5 -3.5  - Permissive HTN; goal SBP < 180 mmHg  - Statin: pending Lipid panel, Continue PTA Lipitor 40 mg  - TTE (with Bubble Study if age 60 yrs or less)  - Telemetry, EKG  - Bedside Glucose Monitoring  - Nutrition: Per nursing   - A1c, Lipid Panel, Troponin x 3  - PT/OT/SLP  - Stroke Education  - Euthermia, Euglycemia    Teresita Phipps PA-C  Vascular Neurology    To page me or covering stroke neurology team member, click here: [facility]  Choose \"On Call\" tab at top, then select \"NEUROLOGY/ALL SITES\" from middle drop-down box, press Enter, then look for \"stroke\" or \"telestroke\" for your site.  ______________________________________________________    Clinically Significant Risk Factors Present on Admission               # Drug Induced Coagulation Defect: home medication list includes an anticoagulant medication    # Hypertension: Noted on problem list      # Severe Obesity: Estimated body mass index is 44.49 kg/m  as calculated from the following:    Height as of 3/13/24: 1.803 m (5' 11\").    Weight as of 3/13/24: 144.7 kg (319 lb).               Past Medical History   Past Medical History:   Diagnosis Date    Advanced directives, counseling/discussion 1/6/2012    Discussed Advance Directive planning with patient; information given to patient to review.    Alcohol dependence (H)     Alcohol dependence in remission (H) 8/2/2018    Allergy, unspecified not elsewhere classified     seasonal    Anemia, unspecified type " 1/22/2021    Aortic valve prosthesis present 2/8/2021    Atrial fibrillation (H)     Benign prostatic hyperplasia 2/8/2021    Bilateral thoracic back pain 11/16/2016    BPH (benign prostatic hypertrophy)     Chronic atrial fibrillation (H) 8/7/2002    Chronic infection     low back wound incision not healing     Chronic low back pain 8/19/2011    Chronic pain     lower back and right leg and left leg    Depressive disorder 6/9/2010    Depression  NOS    Dissection of aorta, thoracic (H) 1992    [employer] aotic valve + arch graft 1992    Elevated prostate specific antigen (PSA) 1/22/2020    Family history of prostate cancer 1/22/2020    Generalized muscle weakness 1/22/2021    Headache(784.0)     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 Provider to review    Leg wound, left 1/9/2021    Long term current use of anticoagulant therapy 8/7/2002    LW Modifier:  Coumadin, since mechanical aortic valve LW Onset:  1992 ; Anticoagulant Rx Long Term Problem list name updated by automated process. Provider to review    Low back pain     Lumbar radiculopathy 4/3/2013    Lumbar surgical wound fluid collection 5/23/2013    Major depression     Memory difficulties 1/16/2015    Mixed hyperlipidemia     Morbid obesity (H) 4/20/2010    LW Modifier:  BMI 41.3 (Apr 2010) ; Obesity Morbid    Numbness and tingling     right leg post surg rightt hip/ also left leg since surg    OA (osteoarthritis)     hips    OAB (overactive bladder) 5/11/2015    Obesity, unspecified     Persons encountering health services in other specified circumstances 4/3/2012    Formatting of this note might be different from the original. EMERGENCY CARE PLAN Presenting Problem Signs and Symptoms Treatment Plan    Questions or conerns during clinic hours    I will call the clinic directly    Questions or conerns outside clinic hours    I will call the 24 hour nurse line at [phone]   Patient needs to schedule an appointment    I will call the 24 hour scheduling team at    Polypharmacy 1/22/2021    Prostate infection     Radiculitis 4/20/2010    LW Modifier:  Right foot, s/p R AMANDA LW Onset:  2002 ; Neuralgia    Recurrent major depressive episodes, in full remission (H24) 10/30/2012    Renal insufficiency 1/22/2021    Rotator cuff tear 1/26/2015    Rotator cuff tear, right    S/P lumbar fusion 4/5/2018    S/P 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 Jose F's valve    APPLY WOUND VAC Left 1/26/2021    Procedure: Placement of negative pressure wound therapy 2,400 squared centimeters  ;  Surgeon: Lazaro Plascencia MD;  Location: [facility] OR    CATARACT IOL, RT/LT      rt eye only    CHOLECYSTECTOMY, LAPOROSCOPIC  8/10    CLOSE SECONDARY WOUND LOWER EXTREMITY Left 2/3/2021    Procedure:  Split Thickness Skin Graft to Leg of 18 square centimeters  Intermediate Closure of Leg of 8cm   ;  Surgeon: Lazaro Plascencia MD;  Location: [facility] OR    COLONOSCOPY N/A 1/15/2015    Procedure: COLONOSCOPY;  Surgeon: Johnson Kothari MD;  Location: [facility] GI    DECOMPRESSION LUMBAR ONE LEVEL  4/3/2013    Procedure: DECOMPRESSION LUMBAR ONE LEVEL;  Open Decompression L3-4 bilateral;  Surgeon: Khalif Casas MD;  Location: [facility] OR    DECOMPRESSION, FUSION CERVICAL ANTERIOR  ONE LEVEL, COMBINED  3/23/2012    Procedure:COMBINED DECOMPRESSION, FUSION CERVICAL ANTERIOR ONE LEVEL; Anterior Cervical Decompression and Fusion C4-6; Surgeon:KHALIF COFFEY; Location:[facility] OR    ELBOW SURGERY      EXPLORE SPINE, REMOVE HARDWARE, COMBINED  5/23/2013    Procedure: COMBINED EXPLORE SPINE, REMOVE HARDWARE;  Exploration Lumbar Wound for fluid collection;  Surgeon: Khalif Coffey MD;  Location: [facility] OR    FUSION CERVICAL ANTERIOR TWO LEVELS  3/26/2012    Procedure:FUSION CERVICAL ANTERIOR TWO LEVELS; Anterior Cervical Fusion C4-6, Anterior Cervical  Hematoma Evacuation; Surgeon:KHALIF COFFEY; Location:[facility] OR    IR LUMBAR EPIDURAL STEROID INJECTION  3/28/2003    IRRIGATION AND DEBRIDEMENT LOWER EXTREMITY, COMBINED Left 1/10/2021    Procedure: 1.  Irrigation and excisional debridement to muscle left distal medial calf chronic wounds total area measuring 9 cm x 4 cm 2.  Irrigation and excisional debridement to fascia left medial calf chronic hematoma measuring 29 x 6.7 x 4.2 cm 3.  Primary complex wound closure left medial distal calf 9 cm in length;  Surgeon: Rod Kemp MD;  Location: [facility] OR    IRRIGATION AND DEBRIDEMENT LOWER EXTREMITY, COMBINED Left 1/26/2021    Procedure: Evacuation of hematoma debridement of skin, subcutaneous tissue and muscle 2,400 squared centimeters, placement of negative pressure wound therapy 2,400 squared centimeters;  Surgeon: Lazaro Plascencia MD;  Location: [facility] OR    IRRIGATION AND DEBRIDEMENT SPINE, CLOSE WOUND, COMBINED  3/26/2012    Procedure:COMBINED IRRIGATION AND DEBRIDEMENT SPINE, CLOSE WOUND; Surgeon:KHALIF COFFEY; Location:[facility] OR    OTHER SURGICAL HISTORY      MN UNLISTED ORBIT    OTHER SURGICAL HISTORY      MN UNLISTED SPINE    OTHER SURGICAL HISTORY      MN UNLISTED NECK/THORAX    OTHER SURGICAL HISTORY      (IA) MN TOTAL HIP ARTHROPLASTY    OTHER SURGICAL HISTORY      (IA) MN NEE SCOPE MED W LAT MENISCECT WWO DEBRIBE/SHAVE ANY CO    removal  of cyst of back   2.5week    TONSILLECTOMY      wisdom teeth[      [facility] NONSPECIFIC PROCEDURE  1992    repair of TAA with graft    [facility] TOTAL HIP ARTHROPLASTY  2010    Left AMANDA    [facility] TOTAL HIP ARTHROPLASTY  2002    R hip replacement comp's by nerve injury with pain down into R leg, nadya below knee     Medications   Home Meds  Prior to Admission medications    Medication Sig Start Date End Date Taking? Authorizing Provider   acetaminophen (TYLENOL) 500 MG tablet Take 1,000 mg by mouth 2 times daily    Unknown, Entered By History   atorvastatin (LIPITOR) 40 MG tablet Take 1 tablet (40 mg) by mouth every evening 12/6/23   Herve Morgan MD   azelastine-fluticasone (DYMISTA) 137-50 MCG/ACT nasal spray Spray 1 spray into both nostrils daily    Unknown, Entered By History   cyclobenzaprine (FLEXERIL) 10 MG tablet TAKE ONE TABLET BY MOUTH THREE TIMES DAILY AS NEEDED FOR MUSCLE SPASM. 6/2/23   Herve Morgan MD   diazepam (VALIUM) 5 MG tablet Take 1 tablet by mouth nightly as needed for anxiety 2/19/24   Herve Morgan MD   donepezil (ARICEPT) 10 MG tablet Take 1 tablet (10 mg) by mouth At Bedtime 3/14/23   Herve Morgan MD   folic acid 5 MG CAPS Take 5 mg by mouth daily    Unknown, Entered By History   guanFACINE (TENEX) 1 MG tablet Take 1 tablet (1 mg) by mouth At Bedtime 3/13/24   Herve Morgan MD   levothyroxine (SYNTHROID/LEVOTHROID) 175 MCG tablet Take 1 tablet (175 mcg) by mouth daily 8/15/23   Herve Morgan MD   lisinopril (ZESTRIL) 10 MG tablet Take 1 tablet (10 mg) by mouth daily 9/15/23   Herve Morgan MD   mirabegron (MYRBETRIQ) 50 MG 24 hr tablet Take 1 tablet (50 mg) by mouth daily 3/13/24 3/13/25  Jamal Andersen MD   pregabalin (LYRICA) 100 MG capsule Take 1 capsule (100 mg) by mouth 3 times daily Do not take if sleepy/sedated. 9/15/23   Herve Morgan MD   propafenone (RYTHMOL) 150 MG TABS tablet Take 1 tablet (150 mg) by mouth every 8 hours 9/1/23   Raquel Miles,    venlafaxine (EFFEXOR  "XR) 75 MG 24 hr capsule Take 3 capsules (225 mg) by mouth daily 3/14/23   Herve Morgan MD   warfarin ANTICOAGULANT (COUMADIN) 5 MG tablet Take 5 mg daily. Or as directed. 23   Herve Morgan MD       Scheduled Meds   dexAMETHasone  2 mL         Infusion Meds      PRN Meds  dexAMETHasone    Allergies   Allergies   Allergen Reactions    Blood Transfusion Related (Informational Only) Other (See Comments)     Patient has a history of a clinically significant antibody against RBC antigens.  A delay in compatible RBCs may occur.      Ciprofloxacin      \"Went nuts\"    Gabapentin      Severe behavioral disturbances     Family History   Family History   Problem Relation Age of Onset    Cerebrovascular Disease Father          age 86, had M.I. ,diabetic also    Prostate Cancer Father     Diabetes Father     Cancer Mother          age 83 of dementia, also h/o lymphoma    Diabetes Mother     Hypertension Brother         2 brothers with hypertension & sleep apnea    Hypertension Sister         Born ~1936    Sleep Apnea Brother          age 78, Alzheimers    No Known Problems Son     No Known Problems Daughter     No Known Problems Son     Family History Negative Brother         Had 5 brothers, three still alive as of     Colon Cancer No family hx of      Social History   Social History     Tobacco Use    Smoking status: Never    Smokeless tobacco: Never   Vaping Use    Vaping Use: Never used   Substance Use Topics    Alcohol use: No     Comment: Stopped drinking alcohol ~    Drug use: No          PHYSICAL EXAMINATION  Temp:  [97.4  F (36.3  C)] 97.4  F (36.3  C)  Pulse:  [63] 63  Resp:  [16] 16  BP: (100-105)/(60-72) 100/72  SpO2:  [97 %-98 %] 98 %     General Exam  General:  patient lying in bed without any acute distress    HEENT:  normocephalic/atraumatic  Pulmonary:  no respiratory distress    Neuro Exam  Mental Status:  alert, oriented x 3, follows commands, speech clear and fluent, asks and " answers questions appropriately, naming and repetition normal  Cranial Nerves:  visual fields intact, left EOMI with normal smooth pursuit, right eye able to look medially but unable to look lateral/cross (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), decreased left facial sensation to light touch (40% in comparison to the right face), midline facial movements symmetric, hearing not formally tested but intact to loud conversation, no dysarthria, tongue protrusion midline  Motor:  no abnormal movements, able to move all limbs spontaneously, strength 5/5 throughout upper and lower extremities, no pronator drift, no leg drift  Sensory:  light touch sensation intact and symmetric throughout upper and lower extremities, however Nicko reports decrease sensation to left lower extremity at baseline, no extinction on double simultaneous stimulation of lower extremities  Coordination:  normal finger-to-nose and heel-to-shin bilaterally without dysmetria  Station/Gait:  deferred    Dysphagia Screen  Per Nursing    Stroke Scales    NIHSS  1a. Level of Consciousness 0-->Alert, keenly responsive   1b. LOC Questions 0-->Answers both questions correctly   1c. LOC Commands 0-->Performs both tasks correctly   2.   Best Gaze 1-->Partial gaze palsy, gaze is abnormal in one or both eyes, but forced deviation or total gaze paresis is not present (Chronic right gaze palsy)   3.   Visual 0-->No visual loss   4.   Facial Palsy 0-->Normal symmetrical movements   5a. Motor Arm, Left 0-->No drift, limb holds 90 (or 45) degrees for full 10 secs   5b. Motor Arm, Right 0-->No drift, limb holds 90 (or 45) degrees for full 10 secs   6a. Motor Leg, Left 0-->No drift, leg holds 30 degree position for full 5 secs   6b. Motor Leg, right 0-->No drift, leg holds 30 degree position for full 5 secs   7.   Limb Ataxia 0-->Absent   8.   Sensory 1-->Mild-to-moderate sensory loss, patient feels pinprick is less sharp or is dull on the affected side,  "or there is a loss of superficial pain with pinprick, but patient is aware of being touched (Decrease sensation of the left face, 40% in comparison to right side of face)   9.   Best Language 0-->No aphasia, normal   10. Dysarthria 0-->Normal   11. Extinction and Inattention  0-->No abnormality   Total 2 (03/18/24 1321)     Imaging  I personally reviewed all imaging; relevant findings per HPI.     Lab Results Data   CBC  Recent Labs   Lab 03/18/24  1243   WBC 5.5   RBC 4.85   HGB 15.8   HCT 46.6        Basic Metabolic Panel    Recent Labs   Lab 03/18/24  1243      POTASSIUM 4.4   CHLORIDE 103   CO2 29   BUN 17.6   CR 1.01   *   CATERINA 9.7     Liver Panel  No results for input(s): \"PROTTOTAL\", \"ALBUMIN\", \"BILITOTAL\", \"ALKPHOS\", \"AST\", \"ALT\", \"BILIDIRECT\" in the last 168 hours.  INR    Recent Labs   Lab Test 03/18/24  1243 03/05/24  1128 02/13/24  1137   INR 2.22* 3.4* 3.2*      Lipid Profile    Recent Labs   Lab Test 10/31/23  1749 09/15/23  1027 04/18/21  1759   CHOL 160 161 254*   HDL 42 43 43   LDL 93 81 164*   TRIG 123 187* 233*     A1C    Recent Labs   Lab Test 10/31/23  1749 09/15/23  1027 01/23/21  0747   A1C 5.7* 5.6 4.9     Troponin    Recent Labs   Lab 03/18/24  1243   CTROPT 13          Stroke Code Data Data   Stroke Code Data  (for stroke code without tele)  Stroke code activated 03/18/24  1232   First stroke provider response 03/18/24  1233   Last known normal 03/18/24  0700   Time of discovery (or onset of symptoms) 03/18/24  1000   Head CT read by Stroke Neuro Provider 03/18/24  1254   Was stroke code de-escalated? Yes  03/18/24  1305     I personally examined and evaluated the patient today. At the time of my evaluation and management the patient was in critical condition today due to stroke code. I personally managed stroke code. Key decisions made today included examination, review of images, need for further imaging. I spent a total of 60 minutes providing critical care services, " evaluating the patient, directing care and reviewing laboratory values and radiologic reports.

## 2024-03-19 ENCOUNTER — APPOINTMENT (OUTPATIENT)
Dept: PHYSICAL THERAPY | Facility: CLINIC | Age: 69
DRG: 065 | End: 2024-03-19
Attending: INTERNAL MEDICINE
Payer: COMMERCIAL

## 2024-03-19 ENCOUNTER — APPOINTMENT (OUTPATIENT)
Dept: OCCUPATIONAL THERAPY | Facility: CLINIC | Age: 69
DRG: 065 | End: 2024-03-19
Attending: INTERNAL MEDICINE
Payer: COMMERCIAL

## 2024-03-19 LAB
CHOLEST SERPL-MCNC: 136 MG/DL
FASTING STATUS PATIENT QL REPORTED: YES
GLUCOSE BLDC GLUCOMTR-MCNC: 110 MG/DL (ref 70–99)
HBA1C MFR BLD: 5.7 %
HDLC SERPL-MCNC: 36 MG/DL
INR PPP: 2.41 (ref 0.85–1.15)
LDLC SERPL CALC-MCNC: 75 MG/DL
NONHDLC SERPL-MCNC: 100 MG/DL
TRIGL SERPL-MCNC: 127 MG/DL

## 2024-03-19 PROCEDURE — 120N000001 HC R&B MED SURG/OB

## 2024-03-19 PROCEDURE — 83036 HEMOGLOBIN GLYCOSYLATED A1C: CPT | Performed by: INTERNAL MEDICINE

## 2024-03-19 PROCEDURE — 82962 GLUCOSE BLOOD TEST: CPT

## 2024-03-19 PROCEDURE — 99223 1ST HOSP IP/OBS HIGH 75: CPT | Mod: FS

## 2024-03-19 PROCEDURE — 97116 GAIT TRAINING THERAPY: CPT | Mod: GP | Performed by: PHYSICAL THERAPIST

## 2024-03-19 PROCEDURE — 97110 THERAPEUTIC EXERCISES: CPT | Mod: GP | Performed by: PHYSICAL THERAPIST

## 2024-03-19 PROCEDURE — 80061 LIPID PANEL: CPT | Performed by: INTERNAL MEDICINE

## 2024-03-19 PROCEDURE — 2894A VOIDCORRECT: CPT | Mod: FS | Performed by: PSYCHIATRY & NEUROLOGY

## 2024-03-19 PROCEDURE — 85610 PROTHROMBIN TIME: CPT | Performed by: INTERNAL MEDICINE

## 2024-03-19 PROCEDURE — 36415 COLL VENOUS BLD VENIPUNCTURE: CPT | Performed by: INTERNAL MEDICINE

## 2024-03-19 PROCEDURE — 97535 SELF CARE MNGMENT TRAINING: CPT | Mod: GO | Performed by: OCCUPATIONAL THERAPIST

## 2024-03-19 PROCEDURE — G0378 HOSPITAL OBSERVATION PER HR: HCPCS

## 2024-03-19 PROCEDURE — 97161 PT EVAL LOW COMPLEX 20 MIN: CPT | Mod: GP | Performed by: PHYSICAL THERAPIST

## 2024-03-19 PROCEDURE — 99291 CRITICAL CARE FIRST HOUR: CPT | Mod: FS | Performed by: PSYCHIATRY & NEUROLOGY

## 2024-03-19 PROCEDURE — 97166 OT EVAL MOD COMPLEX 45 MIN: CPT | Mod: GO | Performed by: OCCUPATIONAL THERAPIST

## 2024-03-19 PROCEDURE — 99232 SBSQ HOSP IP/OBS MODERATE 35: CPT | Performed by: INTERNAL MEDICINE

## 2024-03-19 PROCEDURE — 999N000226 HC STATISTIC SLP IP EVAL DEFER: Performed by: SPEECH-LANGUAGE PATHOLOGIST

## 2024-03-19 PROCEDURE — 99418 PROLNG IP/OBS E/M EA 15 MIN: CPT | Mod: FS

## 2024-03-19 PROCEDURE — 250N000013 HC RX MED GY IP 250 OP 250 PS 637

## 2024-03-19 PROCEDURE — 250N000013 HC RX MED GY IP 250 OP 250 PS 637: Performed by: INTERNAL MEDICINE

## 2024-03-19 RX ORDER — WARFARIN SODIUM 10 MG/1
10 TABLET ORAL
Status: COMPLETED | OUTPATIENT
Start: 2024-03-19 | End: 2024-03-19

## 2024-03-19 RX ORDER — LIDOCAINE 4 G/G
1 PATCH TOPICAL
Status: DISCONTINUED | OUTPATIENT
Start: 2024-03-19 | End: 2024-03-20 | Stop reason: HOSPADM

## 2024-03-19 RX ORDER — ACETAMINOPHEN 500 MG
1000 TABLET ORAL 2 TIMES DAILY
Status: DISCONTINUED | OUTPATIENT
Start: 2024-03-19 | End: 2024-03-20 | Stop reason: HOSPADM

## 2024-03-19 RX ORDER — CYCLOBENZAPRINE HCL 10 MG
10 TABLET ORAL 3 TIMES DAILY PRN
Status: DISCONTINUED | OUTPATIENT
Start: 2024-03-19 | End: 2024-03-20 | Stop reason: HOSPADM

## 2024-03-19 RX ORDER — PREGABALIN 100 MG/1
100 CAPSULE ORAL 3 TIMES DAILY
Status: DISCONTINUED | OUTPATIENT
Start: 2024-03-19 | End: 2024-03-20 | Stop reason: HOSPADM

## 2024-03-19 RX ORDER — PROPAFENONE HYDROCHLORIDE 150 MG/1
150 TABLET, COATED ORAL EVERY 8 HOURS SCHEDULED
Status: DISCONTINUED | OUTPATIENT
Start: 2024-03-19 | End: 2024-03-20 | Stop reason: HOSPADM

## 2024-03-19 RX ORDER — DIAZEPAM 5 MG
5 TABLET ORAL
Status: DISCONTINUED | OUTPATIENT
Start: 2024-03-19 | End: 2024-03-20 | Stop reason: HOSPADM

## 2024-03-19 RX ORDER — WARFARIN SODIUM 7.5 MG/1
7.5 TABLET ORAL
Status: DISCONTINUED | OUTPATIENT
Start: 2024-03-19 | End: 2024-03-19

## 2024-03-19 RX ORDER — ASPIRIN 81 MG/1
81 TABLET ORAL DAILY
Status: DISCONTINUED | OUTPATIENT
Start: 2024-03-19 | End: 2024-03-20

## 2024-03-19 RX ORDER — DONEPEZIL HYDROCHLORIDE 10 MG/1
10 TABLET, FILM COATED ORAL AT BEDTIME
Status: DISCONTINUED | OUTPATIENT
Start: 2024-03-19 | End: 2024-03-20 | Stop reason: HOSPADM

## 2024-03-19 RX ORDER — ATORVASTATIN CALCIUM 40 MG/1
40 TABLET, FILM COATED ORAL EVERY EVENING
Status: DISCONTINUED | OUTPATIENT
Start: 2024-03-19 | End: 2024-03-20 | Stop reason: HOSPADM

## 2024-03-19 RX ADMIN — PROPAFENONE HYDROCHLORIDE 150 MG: 150 TABLET, FILM COATED ORAL at 18:10

## 2024-03-19 RX ADMIN — ATORVASTATIN CALCIUM 40 MG: 40 TABLET, FILM COATED ORAL at 22:45

## 2024-03-19 RX ADMIN — CYCLOBENZAPRINE 10 MG: 10 TABLET, FILM COATED ORAL at 10:07

## 2024-03-19 RX ADMIN — ACETAMINOPHEN 1000 MG: 500 TABLET, FILM COATED ORAL at 09:01

## 2024-03-19 RX ADMIN — LEVOTHYROXINE SODIUM 175 MCG: 100 TABLET ORAL at 09:02

## 2024-03-19 RX ADMIN — PREGABALIN 100 MG: 100 CAPSULE ORAL at 18:10

## 2024-03-19 RX ADMIN — PROPAFENONE HYDROCHLORIDE 150 MG: 150 TABLET, FILM COATED ORAL at 22:45

## 2024-03-19 RX ADMIN — ACETAMINOPHEN 1000 MG: 500 TABLET, FILM COATED ORAL at 22:46

## 2024-03-19 RX ADMIN — LIDOCAINE 1 PATCH: 4 PATCH TOPICAL at 12:40

## 2024-03-19 RX ADMIN — PREGABALIN 100 MG: 100 CAPSULE ORAL at 22:45

## 2024-03-19 RX ADMIN — ASPIRIN 81 MG: 81 TABLET, COATED ORAL at 11:39

## 2024-03-19 RX ADMIN — VENLAFAXINE HYDROCHLORIDE 225 MG: 150 CAPSULE, EXTENDED RELEASE ORAL at 09:02

## 2024-03-19 RX ADMIN — DONEPEZIL HYDROCHLORIDE 10 MG: 10 TABLET ORAL at 22:45

## 2024-03-19 RX ADMIN — PROPAFENONE HYDROCHLORIDE 150 MG: 150 TABLET, FILM COATED ORAL at 10:02

## 2024-03-19 RX ADMIN — PREGABALIN 100 MG: 100 CAPSULE ORAL at 09:02

## 2024-03-19 RX ADMIN — WARFARIN SODIUM 10 MG: 10 TABLET ORAL at 18:10

## 2024-03-19 ASSESSMENT — ACTIVITIES OF DAILY LIVING (ADL)
ADLS_ACUITY_SCORE: 35
ADLS_ACUITY_SCORE: 35
PREVIOUS_RESPONSIBILITIES: MEAL PREP;HOUSEKEEPING;LAUNDRY;SHOPPING;MEDICATION MANAGEMENT;DRIVING
ADLS_ACUITY_SCORE: 35
ADLS_ACUITY_SCORE: 35
ADLS_ACUITY_SCORE: 40
ADLS_ACUITY_SCORE: 35
ADLS_ACUITY_SCORE: 40
ADLS_ACUITY_SCORE: 35
ADLS_ACUITY_SCORE: 40
ADLS_ACUITY_SCORE: 35
ADLS_ACUITY_SCORE: 40
ADLS_ACUITY_SCORE: 35
ADLS_ACUITY_SCORE: 40
ADLS_ACUITY_SCORE: 35
ADLS_ACUITY_SCORE: 35

## 2024-03-19 NOTE — PROGRESS NOTES
March 19, 2024  0800    Observation Goals: -diagnostic tests and consults completed and resulted, Nurse to notify provider when observation goals have been met and patient is ready for discharge.  -diagnostic tests and consults completed and resulted  Not met, pending TTE, PT/OT/ST evals, final stroke neuro recommendations    Nurse to notify provider when observation goals have been met and patient is ready for discharge

## 2024-03-19 NOTE — PROGRESS NOTES
03/19/24 1655   Appointment Info   Signing Clinician's Name / Credentials (OT) Lupe Vivas OTR/L   Living Environment   People in Home spouse   Current Living Arrangements house   Home Accessibility stairs to enter home;stairs within home   Number of Stairs, Main Entrance 2   Stair Railings, Main Entrance none   Number of Stairs, Within Home, Primary seven   Living Environment Comments Pt lives with his wife, who is able to assist at dc; wife is currently supporting dtr's children as dtr is in the hospital   Self-Care   Usual Activity Tolerance good   Current Activity Tolerance moderate   Equipment Currently Used at Home grab bar, tub/shower;shower chair   Fall history within last six months yes   Number of times patient has fallen within last six months 4   Activity/Exercise/Self-Care Comment Indep with ADLs; indep with mobility at baseline   Instrumental Activities of Daily Living (IADL)   Previous Responsibilities meal prep;housekeeping;laundry;shopping;medication management;driving   IADL Comments wife manages finances, pt/wife share errands/groceries   General Information   Onset of Illness/Injury or Date of Surgery 03/19/24   Referring Physician Cindy Reyes MD   Patient/Family Therapy Goal Statement (OT) home   Additional Occupational Profile Info/Pertinent History of Current Problem per chart: Todd S Aschoff is a 68 year old male with multiple medical problems including history of aortic dissection repair and mechanical aortic valve replacement in 1992 and paroxysmal atrial fibrillation on warfarin, chronic back pain/neuropathy, HTN, HLD, (dents to the emergency department with left facial numbness.  Stroke workup including head CT, CTAHN showed no acute changes.      Principal Problem:   Acute ischemic stroke of the right lateral cerebellum and bilateral cortical parietal lobes, likely cardioembolic   Existing Precautions/Restrictions fall   Left Upper Extremity (Weight-bearing Status) full  weight-bearing (FWB)   Right Upper Extremity (Weight-bearing Status) full weight-bearing (FWB)   Left Lower Extremity (Weight-bearing Status) full weight-bearing (FWB)   Right Lower Extremity (Weight-bearing Status) full weight-bearing (FWB)   General Observations and Info activity order: ambulate with assist 4x daily   Cognitive Status Examination   Orientation Status orientation to person, place and time   Affect/Mental Status (Cognitive) WFL   Follows Commands follows one-step commands;over 90% accuracy   Cognitive Status Comments pt reports being forgetful since stroke a few months ago; pt tangential during session, and sometimes gives unrelated answers to interview questions   Cognitive Screens/Assessments   Cognitive Assessments Completed Other Cognitive Screen/Assessment   Cognitive Assessment Test short blessed test   Cognitive Assessment Test Score 7/28   Cognitive Assessment Test Interpretation score consistent with mild cognitive impairment   Visual Perception   Impact of Vision Impairment on Function (Vision) pt with baseline difficulty tracking R eye, his brain compensates and pt denies diploplia; peripheral fields and tracking appear WFL   Sensory   Sensory Comments numbness L face/ear   Posture   Posture forward head position;protracted shoulders   Range of Motion Comprehensive   Comment, General Range of Motion BUE WFL   Strength Comprehensive (MMT)   Comment, General Manual Muscle Testing (MMT) Assessment BUE WFL   Coordination   Upper Extremity Coordination No deficits were identified   Coordination Comments opposition WFL   Bed Mobility   Comment (Bed Mobility) NT   Transfers   Transfers sit-stand transfer;toilet transfer;shower transfer   Sit-Stand Transfer   Sit-Stand [place] (Transfers) contact guard   Shower Transfer   [place] Level (Shower Transfer) contact guard   Shower Transfer Comments tub/shower with grab bar and shower chair   Toilet Transfer   [place] Level (Toilet  Transfer) contact guard   Toilet Transfer Comments heavy use of grab bar   Balance   Balance Comments good seated; mildly unsteady with room ambulation, no LOB   Activities of Daily Living   BADL Assessment/Intervention lower body dressing;grooming;toileting   Lower Body Dressing Assessment/Training   [place] Level (Lower Body Dressing) supervision   Grooming Assessment/Training   [place] Level (Grooming) supervision   Toileting   [place] Level (Toileting) contact guard assist   Clinical Impression   Criteria for Skilled Therapeutic Interventions Met (OT) Yes, treatment indicated   OT Diagnosis impaired ADLs   OT Problem List-Impairments impacting ADL problems related to;balance;cognition;vision;sensation   Assessment of Occupational Performance 3-5 Performance Deficits   Identified Performance Deficits med management, home management, tub/shower transfer   Planned Therapy Interventions (OT) ADL retraining;cognition;transfer training   Clinical Decision Making Complexity (OT) detailed assessment/moderate complexity   Risk & Benefits of therapy have been explained evaluation/treatment results reviewed;patient   OT Total Evaluation Time   OT Eval, Moderate Complexity Minutes (42663) 10   OT Goals   Therapy Frequency (OT) Daily   OT Predicted Duration/Target Date for Goal Attainment 03/26/24   OT Goals Hygiene/Grooming;Cognition;OT Goal 1;OT Goal 2;Toilet Transfer/Toileting   OT: Hygiene/Grooming modified independent   OT: Toilet Transfer/Toileting Modified independent;toilet transfer;cleaning and garment management   OT: Cognitive Patient/caregiver will verbalize understanding of cognitive assessment results/recommendations as needed for safe discharge planning   OT: Goal 1 Pt will complete Mod difficult med box set-up 100% correctly   OT: Goal 2 [place] tub/shower transfer   Interventions   Interventions Quick Adds Self-Care/Home Management   OT Discharge Planning   OT Plan med box (mod difficulty),  tub/shower transfer, toilet transfer, g/h standing at sink; pt close to new baseline, may be only 1-2 sessions   OT Discharge Recommendation (DC Rec) home with assist;home with outpatient occupational therapy   OT Rationale for DC Rec Pt close to recent baseline, limited by decreased balance and impaired cognition.  Anticipate he will progress to discharge to home.  Pt may benefit from OP OT for cognitive eval and treatment.   OT Brief overview of current status SBA/CGA toilet transfer, 7/28 SBT   Total Session Time   Timed Code Treatment Minutes 23   Total Session Time (sum of timed and untimed services) 33

## 2024-03-19 NOTE — PLAN OF CARE
Nida Rene, RN on 3/19/2024 at 6:00 AM    Reason for Admission: facial numbness- R stroke  Cognitive/Mentation: A/Ox 4    Neuros/CMS: Intact ex L ear numbness  VS: VSS on RA.   Tele: NSR.  GI: BS active, + flatus, last BM 3/18. Continent.  :  Continent.  Pulmonary: LS clear.  Pain: denies.   Drains/Lines: PIV SL  Skin: intact, scars on back   Activity: SBA  Diet: reg with thin liquids. Takes pills whole.     Therapies recs: TBD  Discharge: TBD  Aggression Stoplight Tool: green  End of shift summary: Stable overnight

## 2024-03-19 NOTE — PROGRESS NOTES
1200p   03/19/24 1100   Appointment Info   Signing Clinician's Name / Credentials (PT) Lina Hanna PT   Living Environment   People in Home spouse   Current Living Arrangements house   Home Accessibility stairs to enter home;stairs within home   Number of Stairs, Main Entrance 2   Stair Railings, Main Entrance none   Number of Stairs, Within Home, Primary seven   Self-Care   Usual Activity Tolerance good   Current Activity Tolerance moderate   Equipment Currently Used at Home none   Fall history within last six months yes   Number of times patient has fallen within last six months 4   Activity/Exercise/Self-Care Comment prev indep w ADLS, indep w mobility PTA, has AD to use at home   General Information   Onset of Illness/Injury or Date of Surgery 03/18/24   Pertinent History of Current Problem (include personal factors and/or comorbidities that impact the POC) 68 year old male with a PMH significant for atrial fibrillation (on warfarin), mechanical aortic valve, thoracic aortic dissection, HLD, depression, history of stroke. Presented on 3/18 with left facial numbness. He woke up yesterday (3/18) at 7 AM with no symptoms and then went back to sleep. When he awoke at 10 AM he had new new left ear numbness that then spread to include the left side of his face and left scalp. The numbnesshas has been persisting and progressed to include the left truncal area   Existing Precautions/Restrictions fall   Cognition   Affect/Mental Status (Cognition) WFL;flat/blunted affect   Orientation Status (Cognition) oriented x 4   Follows Commands (Cognition) WFL   Posture    Posture Forward head position;Protracted shoulders   Range of Motion (ROM)   Range of Motion ROM is WFL   Strength (Manual Muscle Testing)   Strength (Manual Muscle Testing) Deficits observed during functional mobility   Bed Mobility   Bed Mobility supine-sit   Supine-Sit [place] (Bed Mobility) contact guard   Bed Mobility Limitations decreased  ability to use arms for pushing/pulling   Assistive Device (Bed Mobility) bed rails   Transfers   Transfers sit-stand transfer   Maintains Weight-bearing Status (Transfers) physical assist to maintain   Impairments Contributing to Impaired Transfers decreased strength;impaired coordination   Comment, (Transfers) CGA STS wide HERMES, mod UE dependence   Gait/Stairs (Locomotion)   Comment, (Gait/Stairs) CGA to amb 10 feet with wide HERMES, dec step length and slow pace   Balance   Balance Comments demonstrates impairement with Standing and gait utilizing widen HERMES strategy to offset imbalance   Clinical Impression   Criteria for Skilled Therapeutic Intervention Yes, treatment indicated   PT Diagnosis (PT) CVI   Influenced by the following impairments impaired gait dec strength   Functional limitations due to impairments impiared mobility   Clinical Presentation (PT Evaluation Complexity) evolving   Clinical Presentation Rationale clinical judgement   Clinical Decision Making (Complexity) low complexity   Planned Therapy Interventions (PT) balance training;bed mobility training;gait training;strengthening;stair training;neuromuscular re-education   Risk & Benefits of therapy have been explained evaluation/treatment results reviewed;care plan/treatment goals reviewed;risks/benefits reviewed   PT Discharge Planning   PT Discharge Recommendation (DC Rec) home with outpatient physical therapy   PT Rationale for DC Rec Pt appears below an indep baseline for mobility, requiring close SBA or CGA with mobility, moderate UE reliance to self assist will benefit from PT during stay anticipating pt may benefit from returning to OPPT at DC

## 2024-03-19 NOTE — UTILIZATION REVIEW
Admission Status; Secondary Review Determination     Admission Date: 3/18/2024 12:24 PM       Under the authority of the Utilization Management Committee, the utilization review process indicated a secondary review on the above patient.  The review outcome is based on review of the medical records, discussions with staff, and applying clinical experience noted on the date of the review.        (x)      Inpatient Status Appropriate - This patient's medical care is consistent with medical management for inpatient care and reasonable inpatient medical practice.       RATIONALE FOR DETERMINATION      Brief clinical presentation, information copied from the chart, abbreviated and edited for relevant content:     Asked to be reviewed by attending for IP due to acute stroke, not discharging tonight. Agreed to advance.     Todd S Aschoff is a 68 year old male admitted and found to have Acute ischemic stroke of the right lateral cerebellum and bilateral cortical parietal lobes due to an embolic sources, likely  cardioembolism in the setting up subtherapeutic INR. Receiving standard stroke care, PT/OT/ST consults, plan to Continue telemetry while inpatient, Lipid and echocardiogram pending.  For secondary stroke prevention plan to Continue Warfarin, INR goal 2.5 -3.5   And initiate aspirin 81 mg until INR is within goal, then can discontinue Aspirin. Optimize risk factors of BP and lipids.      At the time of admission with the information available to the attending physician, more than 2 nights hospital complex care was anticipated. Also, there was a risk of adverse outcome if patient was treated outpatient or observation. High intensity of services anticipated. Inpatient admission appropriate based on Medicare guidelines.       The information on this document is developed by the utilization review team in order for the business office to ensure compliance.  This only denotes the appropriateness of proper admission status  and does not reflect the quality of care rendered.         The definitions of Inpatient Status and Observation Status used in making the determination above are those provided in the CMS Coverage Manual, Chapter 1 and Chapter 6, section 70.4.      Sincerely,      Thania Schwartz MD   Utilization Review/ Case Management  [facility].

## 2024-03-19 NOTE — PLAN OF CARE
Pt here with Acute stroke. A&Ox4. Neuros intact ex L ear numbness. VSS. Tele SR. Reg diet, thin liquids. Takes pills whole. Up with SBA. Denies pain. Pt scoring green on the Aggression Stop Light Tool. Plan echo pending, OT eval pending. Discharge plan physical therapy recommends home with outpatient therapy.

## 2024-03-19 NOTE — PHARMACY-CONSULT NOTE
Pharmacy Consult to evaluate for medication related stroke core measures    Todd S Aschoff, 68 year old male admitted for CVA on 3/18/2024.    Thrombolytic was not given because of Clinical contraindications    VTE Prophylaxis  warfarin    Antithrombotic: aspirin started on 3/19, as appropriate by end of hospital day 2. Continue antithrombotic therapy on discharge to meet quality measures, unless contraindicated.    Anticoagulation if history of A-fib/flutter: Patient on warfarin; continue anticoagulation on discharge to meet quality measures, unless contraindicated.    LDL Cholesterol Calculated   Date Value Ref Range Status   10/31/2023 93 <=100 mg/dL Final   04/18/2021 164 (H) <100 mg/dL Final     Comment:     Above desirable:  100-129 mg/dl  Borderline High:  130-159 mg/dL  High:             160-189 mg/dL  Very high:       >189 mg/dl         Patient currently receiving Lipitor (atorvastatin) continue statin on discharge to meet quality measures, unless contraindicated.    Recommendations: None at this time    Thank you for the consult.    JANET ZIMMERMAN RPH 3/19/2024 2:05 PM

## 2024-03-19 NOTE — PROGRESS NOTES
SLP: Orders received. Chart reviewed and discussed with care team.  Pt passed the nursing swallow screen and has been tolerating a regular diet without difficulty.  Facial numbness is improving per report.  Speech-language function appears to be at baseline.  SLP not indicated due to no current communication or swallow concerns.  Defer discharge recommendations to medical care team.  Will complete orders.

## 2024-03-19 NOTE — PROGRESS NOTES
"[facility]    Medicine Progress Note - Hospitalist Service    Date of Admission:  3/18/2024    Assessment & Plan      Todd S Aschoff is a 68 year old male with multiple medical problems including history of aortic dissection repair and mechanical aortic valve replacement in 1992 and paroxysmal atrial fibrillation on warfarin, chronic back pain/neuropathy, HTN, HLD, (dents to the emergency department with left facial numbness.  Stroke workup including head CT, CTAHN showed no acute changes.     Principal Problem:   Acute ischemic stroke of the right lateral cerebellum and bilateral cortical parietal lobes, likely cardioembolic  Brain MRI shows, \"small recent ischemic infarcts at the lateral right cerebellar hemisphere and at the posterolateral right frontal lobe consistent with embolic infarcts from a central embolic source.\"  Stroke neurology consult requested, I appreciate Teresita Phipps's evaluation and recommendations.    Per them, \"Dr. Marcus and I discussed with Nicko suspected etiology of his stroke was due to subtherapeutic INR and new recommendation for INR goal to be between 2.8- 3.5.\"  Also per stroke neurology, begin aspirin 81 mg daily until INR is within goal then discontinue aspirin    Active Problems:    PAF (paroxysmal atrial fibrillation) (H)  Continue propafenone for ventricular rate/rhythm control  Continue warfarin for thromboembolism prevention, pharmacy to dose      Hypothyroidism  Continue thyroid hormone      Mechanical AV Replacement 1992 -- on Warfarin   INR is therapeutic 3/18  Continue warfarin, Pharm.D. to dose  See discussion above regarding new INR goal      Memory difficulties  Continue Aricept      Depression, anxiety  Continue PTA Effexor, guanfacine, Valium       Chronic pain  Continue PTA Lyrica      Hx of CVA x 4, 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  See discussion above        Diet: Regular Diet Adult    DVT Prophylaxis: Warfarin  Zimmer Catheter: Not present  Lines: " "None     Cardiac Monitoring: ACTIVE order. Indication: Stroke, acute (48 hours)  Code Status: Full Code      Clinically Significant Risk Factors Present on Admission               # Drug Induced Coagulation Defect: home medication list includes an anticoagulant medication    # Hypertension: Noted on problem list      # Severe Obesity: Estimated body mass index is 44.49 kg/m  as calculated from the following:    Height as of 3/13/24: 1.803 m (5' 11\").    Weight as of 3/13/24: 144.7 kg (319 lb).              Disposition Plan      Expected Discharge Date: 03/21/2024           anticipate discharge home when echo is complete and INR is therapeutic, possibly as soon as tomorrow         Cindy Reyes MD  Hospitalist Service  [facility]  Securely message with [employer] (more info)  Text page via [employer] Paging/Directory   ______________________________________________________________________    Interval History   \"Will the sensation ever return?\"  Patient says the numbness in his left face has improved but not resolved completely.  He has some pain around the left ear.  He denies headache, no respiratory or GI complaints.    Physical Exam   Vital Signs: Temp: (P) 98.4  F (36.9  C) Temp src: (P) Axillary BP: (P) 114/73 Pulse: (P) 76   Resp: (P) 17 SpO2: (P) 95 % O2 Device: (P) None (Room air)    Weight: 0 lbs 0 oz    Constitutional: Awake, alert, cooperative, no apparent distress  Respiratory: Clear to auscultation bilaterally, no crackles or wheezing  Cardiovascular: Regular rate and rhythm.  Crisp valve sounds  GI: Normal bowel sounds, soft, non-distended, non-tender  Skin/Integumen: No rash on exposed skin.  No lower extremity edema  Other: Mood is very pleasant      Medical Decision Making       35 MINUTES SPENT BY ME on the date of service doing chart review, history, exam, documentation & further activities per the note.      Data     I have personally reviewed the following data over the past " 24 hrs:    TSH: N/A T4: N/A A1C: 5.7 (H)     INR:  2.41 (H) PTT:  N/A   D-dimer:  N/A Fibrinogen:  N/A       Imaging results reviewed over the past 24 hrs:   No results found for this or any previous visit (from the past 24 hour(s)).

## 2024-03-19 NOTE — PHARMACY-ANTICOAGULATION SERVICE
Clinical Pharmacy - Warfarin Dosing Consult     Pharmacy has been consulted to manage this patient s warfarin therapy.  Indication: Mechanical Aortic Valve Replacement  Therapy Goal: INR 2.5-3.5  Warfarin Prior to Admission: Yes  Warfarin PTA Regimen: 7.5 mg Tue, Sat. 5 mg all other days.  Significant drug interactions: propafenone    INR   Date Value Ref Range Status   03/18/2024 2.22 (H) 0.85 - 1.15 Final   03/05/2024 3.4 (H) 0.9 - 1.1 Final     Chromogenic Factor 10   Date Value Ref Range Status   06/03/2021 17 (L) 70 - 130 % Final     Comment:     Therapeutic Range:  A Chromogenic Factor 10 level of approximately 20-40%   inversely correlates with an INR of 2-3 for patients receiving Warfarin.   Chromogenic Factor 10 levels below 20% indicate an INR greater than 3 and   levels above 40% indicate an INR less than 2.         Recommend warfarin 7.5 mg today.  Pharmacy will monitor Todd S Aschoff daily and order warfarin doses to achieve specified goal.      Please contact pharmacy as soon as possible if the warfarin needs to be held for a procedure or if the warfarin goals change.       [facility]  Operative Note    Date: 09/30/2020    Pre-Op Diagnosis: BPH with bladder outlet obstruction    Post-Op Diagnosis: same    Procedure(s) Performed:   TURP, bipolar     Specimen(s): prostate chips    Staff Surgeon: Joanna Dowell MD    Anesthesia: General endotracheal anesthesia    Indications: Eron Turk is a 71 y.o. male with urinary retention and bph with obstructing median lobe    Findings:   UO's in normal anatomic position effluxing clear urine.   Median lobe resected. Thin just distal to bladder neck. Minimal b lobe or anterior hypertrophy but what was seen was resected.     Estimated Blood Loss: 100cc    Drains: 22 Fr hematuria sharma catheter placed over wire with 50cc in balloon    Procedure in detail: After the risks and benefits of the procedure were explained, consent was obtained, and the patient was taken to the operating suite and placed in the supine position.  General anesthesia was administered.  When adequately sedated, the patient was placed in dorsal lithotomy position and prepped and draped in normal sterile fashion.  Timeout was performed and preoperative ABx were confirmed.       A 26-Micronesian sheath resectoscope was placed into the bladder using a visual obturator. The visual obturator was exchanged for the resecting mechanism. The ureteral orifices were identified. The median lobe was first resected with care to avoid the ureteral orifices. Once the median lobe was resected, I then turned our attention to the right lateral lobe of the prostate.The right lateral lobe was then resected in a stepwise fashion from 7 o'clock to 12 o'clock with care to leave the verumontanum and sphincter intact. Once this was performed I then directed our attention to the left lobe of the prostate and again the lateral lobe was resected from the 5 o'clock to the 12 o'clock position. Hemostasis was then achieved.     The ureteral orifices, verumontanum and sphincter  were intact. The #waywire evacuator was used to remove all prostate chips and again hemostasis was obtained.      Once the bladder was drained, I could see that he had an open channel and the scope was removed. A wire was advanced into bladder.  A 22 Fr 3-way hematuria catheter was placed in the bladder over the wire with 50 mL of water in the balloon. I then irrigated with normal saline to clear. This irrigated easily and quickly. The patient was placed on traction and on CBI. The patient was transferred to recovery in stable condition.    .     Disposition:   Short term plan:  · Admit to medicine overnight for extended recovery  · norco as needed for pain  · Continue rocephin 2g iv q24 hours inpt  · B&o 60mg x 3 doses here  · CBI and titrate to light pink overnight  · Discontinue cbi in the morning at 6am and remove 20cc from the sharma.   · Have patient ambulate   · Secure chat me with urine color after patient ambulates  · Patient will go home tomorrow morning with sharma catheter as long as he does not have bloody urine with clots    Long term plan:  · Return Wednesday as a urology nurse visit to remove catheter. The nurse will fill your bladder, remove your catheter and make sure you empty your bladder. If you are unable to void you will be asked to return that afternoon to check and ensure that your emptying with a little ultrasound in the office.  · Return in 2 weeks for post op nurse visit, however doctor will see you at this visit  · Return in 4-5 weeks with . She will check to make sure you are emptying and urinating well. She will also check for any scar tissue.   · Home with norco/hydrocodone 5/325 for pain, dispense 15; cipro twice a day x 3days (or finish augmentin if you have 3 more days left), lidocaine jelly for catheter pain. Prescriptions sent by me already   · Discontinue flomax and finasteride once prescription runs out      Joanna oDwell MD

## 2024-03-19 NOTE — CONSULTS
[facility]    Stroke Consult Note    Reason for Consult:  Stroke    Chief Complaint: Numbness     HPI  Todd S Aschoff is a right handed 68 year old male with a PMH significant for atrial fibrillation (on warfarin), mechanical aortic valve, thoracic aortic dissection, HLD, depression, history of stroke. Presented on 3/18 with left facial numbness. He woke up yesterday (3/18) at 7 AM with no symptoms and then went back to sleep. When he awoke at 10 AM he had new new left ear numbness that then spread to include the left side of his face and left scalp. The numbnesshas has been persisting and progressed to include the left truncal area.  He denies any left arm or leg numbness, unilateral weakness, speech changes, or vision changes.  Then while en route to the emergency department he developed a headache.  The headache originally localized to the frontal area, but then migrated to the left side of his head.  Nicko denies similar episodes of left facial numbness. Of note, Nicko's most recent stroke presented with hearing loss. He currently takes warfarin 5 mg on MWThF and 7.5 mg on Sat/Tues. Nicko reports taking his warfarin yesterday evening and denies any recent skipped doses. MRI Brain revealed ischemic infarcts to the lateral right cerebellum and bilateral cortical parietal lobes   Presenting blood pressure was 105/60. INR 2.22.      Today, Nicko reports improvement in his left facial numbness as it only is localized to the left ear and on the left side of his neck.  He denies any new numbness or weakness. INR today 2.41.  On neurologic exam, NIHSS of 2 for left facial numbness (in V3 distribution only) and right eye partial palsy.  No right upper extremity ataxia with finger-to-nose, however right dysmetria is appreciated with right finger kay.  Dr. Marcus and I discussed with Nicko suspected etiology of his stroke was due to subtherapeutic INR and new recommendation for INR goal to be  between 2.8- 3.5.    Stroke Evaluation Summarized  MRI/Head CT MRI Brain: Small recent ischemic infarcts of the lateral right cerebellum, bilateral cortical parietal lobe    CTH: No evidence of hemorrhage or acute intracranial pathology. Chronic infarcts of the bilateral cerebellar hemispheres.    Intracranial Vasculature CTA Head: No proximal large vessel occlusion. Moderate focal stenosis in the P3 segment of the right PCA.    Cervical Vasculature CTA Neck: No LVO, significant stenosis or dissection.      Echocardiogram Pending    EKG/Telemetry Sinus rhythm with Premature atrial complexes   Left axis deviation    Other Testing INR: 2.22 -> 2.30 -> 2.41     LDL  3/19/2024: 75 mg/dL   A1C  3/19/2024: 5.7 %   Troponin 3/18/2024: 13 ng/L     Impression  Acute ischemic stroke of the right lateral cerebellum and bilateral cortical parietal lobes due to an embolic sources, likely  cardioembolism in the setting up subtherapeutic INR    Recommendations   Acute stroke management  - Neuro checks and vital signs every 4 hours  - SBP < 160  - PT/OT/ST consults  - Bedside Glucose Monitoring  - Euthermia, Euglycemia     Diagnostic testing  - Continue telemetry while inpatient  - Awaiting echocardiogram    Secondary stroke prevention  - Continue Warfarin, PTA INR goal 2.5 -3.5. Recommend new INR goal be 2.8 - 3.5.    - Initiate aspirin 81 mg until INR is within goal, then can discontinue Aspirin 81 mg (ordered)  - LDL 75 (goal 40-70); Continue PTA Lipitor 40mg with ongoing outpatient titration to achieve LDL goal   - Long term goal BP is <130/80 to be achieved as outpatient within several weeks. Tighter control associated with improved vascular outcomes; recommend home blood pressure monitoring and keeping a BP log for primary care follow up  - Blood glucose monitoring, Hgb A1c 5.7%, (goal <7% for secondary stroke prevention), follow-up with PCP  - Encourage Mediterranean diet and daily exercise    Patient Follow-up    - in the  "next 1-2 week(s) with PCP  - in 6-8 weeks with Teresita Woods PA-C or any stroke THOR ([phone]) (ordered)    Thank you for this consult. We will continue to follow.     Teresita Phipps PA-C  Vascular Neurology    To page me or covering stroke neurology team member, click here: [facility]COM  Choose \"On Call\" tab at top, then select \"NEUROLOGY/ALL SITES\" from middle drop-down box, press Enter, then look for \"stroke\" or \"telestroke\" for your site.  _____________________________________________________    Clinically Significant Risk Factors Present on Admission               # Drug Induced Coagulation Defect: home medication list includes an anticoagulant medication    # Hypertension: Noted on problem list      # Severe Obesity: Estimated body mass index is 44.49 kg/m  as calculated from the following:    Height as of 3/13/24: 1.803 m (5' 11\").    Weight as of 3/13/24: 144.7 kg (319 lb).               Past Medical History    Past Medical History:   Diagnosis Date    Advanced directives, counseling/discussion 1/6/2012    Discussed Advance Directive planning with patient; information given to patient to review.    Alcohol dependence (H)     Alcohol dependence in remission (H) 8/2/2018    Allergy, unspecified not elsewhere classified     seasonal    Anemia, unspecified type 1/22/2021    Aortic valve prosthesis present 2/8/2021    Atrial fibrillation (H)     Benign prostatic hyperplasia 2/8/2021    Bilateral thoracic back pain 11/16/2016    BPH (benign prostatic hypertrophy)     Chronic atrial fibrillation (H) 8/7/2002    Chronic infection     low back wound incision not healing     Chronic low back pain 8/19/2011    Chronic pain     lower back and right leg and left leg    Depressive disorder 6/9/2010    Depression  NOS    Dissection of aorta, thoracic (H) 1992    St Jose F aotic valve + arch graft 1992    Elevated prostate specific antigen (PSA) 1/22/2020    Family history of prostate cancer 1/22/2020    Generalized muscle " weakness 1/22/2021    Headache(784.0)     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 Provider to review    Leg wound, left 1/9/2021    Long term current use of anticoagulant therapy 8/7/2002    LW Modifier:  Coumadin, since mechanical aortic valve LW Onset:  1992 ; Anticoagulant Rx Long Term Problem list name updated by automated process. Provider to review    Low back pain     Lumbar radiculopathy 4/3/2013    Lumbar surgical wound fluid collection 5/23/2013    Major depression     Memory difficulties 1/16/2015    Mixed hyperlipidemia     Morbid obesity (H) 4/20/2010    LW Modifier:  BMI 41.3 (Apr 2010) ; Obesity Morbid    Numbness and tingling     right leg post surg rightt hip/ also left leg since surg    OA (osteoarthritis)     hips    OAB (overactive bladder) 5/11/2015    Obesity, unspecified     Persons encountering health services in other specified circumstances 4/3/2012    Formatting of this note might be different from the original. EMERGENCY CARE PLAN Presenting Problem Signs and Symptoms Treatment Plan   Questions or conerns during clinic hours    I will call the clinic directly    Questions or conerns outside clinic hours    I will call the 24 hour nurse line at [phone]   Patient needs to schedule an appointment    I will call the [facility] hour scheduling team at    [facility]    Prostate infection     Radiculitis 4/20/2010    LW Modifier:  Right foot, s/p R AMANDA LW Onset:  2002 ; Neuralgia    Recurrent major depressive episodes, in full remission (H24) 10/30/2012    Renal insufficiency 1/22/2021    Rotator cuff tear 1/26/2015    Rotator cuff tear, right    S/P lumbar fusion 4/5/2018    S/P 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 Authorizing Provider   acetaminophen (TYLENOL) 500 MG tablet Take 1,000 mg by mouth 2 times daily   Yes Unknown, Entered By History   atorvastatin (LIPITOR) 40 MG tablet Take 1 tablet (40 mg) by mouth every evening 12/6/23  Yes Herve Morgan MD   azelastine (ASTELIN) 0.1 % nasal spray Spray 1 spray into both nostrils daily   Yes Unknown, Entered By History   cyclobenzaprine (FLEXERIL) 10 MG tablet TAKE ONE TABLET BY MOUTH THREE TIMES DAILY AS NEEDED FOR MUSCLE SPASM. 6/2/23  Yes Herve Morgan MD   diazepam (VALIUM) 5 MG tablet Take 1 tablet by mouth nightly as needed for anxiety  Patient taking differently: Take 5 mg by mouth at bedtime 2/19/24  Yes Herve Morgan MD   donepezil (ARICEPT) 10 MG tablet Take 1 tablet (10 mg) by mouth At Bedtime 3/14/23  Yes Herve Morgan MD   folic acid (FOLVITE) 400 MCG tablet Take 400 mcg by mouth daily   Yes Unknown, Entered By History   guanFACINE (TENEX) 1 MG tablet Take 1 tablet (1 mg) by mouth At Bedtime 3/13/24  Yes Herve Morgan MD   levothyroxine (SYNTHROID/LEVOTHROID) 175 MCG tablet Take 1 tablet (175 mcg) by mouth daily 8/15/23  Yes Herve Morgan MD   lisinopril (ZESTRIL) 10 MG tablet Take 1 tablet (10 mg) by mouth daily 9/15/23  Yes Herve Morgan MD   mirabegron (MYRBETRIQ) 50 MG 24 hr tablet Take 1 tablet (50 mg) by mouth daily 3/13/24 3/13/25 Yes Jamal Andersen MD   pregabalin (LYRICA) 100 MG capsule Take 1 capsule (100 mg) by mouth 3 times daily Do not take if sleepy/sedated. 9/15/23  Yes Herve Morgan,  "MD   propafenone (RYTHMOL) 150 MG TABS tablet Take 1 tablet (150 mg) by mouth every 8 hours 9/1/23  Yes Raquel Miles DO   venlafaxine (EFFEXOR XR) 75 MG 24 hr capsule Take 3 capsules (225 mg) by mouth daily 3/14/23  Yes Herve Morgan MD   warfarin ANTICOAGULANT (COUMADIN) 5 MG tablet Take 1.5 tablets (7.5 mg) by mouth every Tue, Sat; Take 1 tablet (5 mg) by mouth all other days of the week Or as directed. 3/18/24  Yes Herve Morgan MD       Scheduled Meds   acetaminophen  1,000 mg Oral BID    aspirin  81 mg Oral Daily    atorvastatin  40 mg Oral QPM    donepezil  10 mg Oral At Bedtime    levothyroxine  175 mcg Oral Daily    lidocaine  1 patch Transdermal Q24H    pregabalin  100 mg Oral TID    propafenone  150 mg Oral Q8H RANGEL    venlafaxine  225 mg Oral Daily    warfarin ANTICOAGULANT  10 mg Oral ONCE at 18:00    Warfarin Therapy Reminder  1 each Oral See Admin Instructions       Infusion Meds   - MEDICATION INSTRUCTIONS -      - MEDICATION INSTRUCTIONS -      - MEDICATION INSTRUCTIONS -         Allergies   Allergies   Allergen Reactions    Blood Transfusion Related (Informational Only) Other (See Comments)     Patient has a history of a clinically significant antibody against RBC antigens.  A delay in compatible RBCs may occur.      Ciprofloxacin      \"Went nuts\"    Gabapentin      Severe behavioral disturbances          PHYSICAL EXAMINATION   Temp:  [97.4  F (36.3  C)-98.4  F (36.9  C)] (P) 98.4  F (36.9  C)  Pulse:  [63-81] (P) 76  Resp:  [16-18] (P) 17  BP: ()/(62-90) (P) 114/73  SpO2:  [95 %-99 %] (P) 95 %    General Exam  General:  patient lying in bed without any acute distress    HEENT:  normocephalic/atraumatic  Pulmonary:  no respiratory distress    Neuro Exam  Mental Status:  alert, oriented to age, month, year, current president, follows commands, speech clear and fluent, naming and repetition normal  Cranial Nerves:  visual fields intact, left eye EOM full, right eye able to look " medial but not laterally (partial palsy), decreased light touch facial sensation in V3 distribution otherwise facial sensation intact and symmetric in V1 and V2, facial movements symmetric, hearing not formally tested but intact to loud conversation (decreased left eye hearing at baseline), no dysarthria, tongue protrusion midline  Motor:  normal muscle tone and bulk, no abnormal movements, able to move all limbs spontaneously, no pronator drift, no leg drift   Sensory:  light touch sensation intact and symmetric throughout upper and lower extremities, no extinction on double simultaneous stimulation   Coordination:  normal finger-to-nose and heel-to-shin bilaterally without dysmetria, overcorrection/dysmetria present with right finger kay, normal left finger kay, rapid alternating movements symmetric  Station/Gait:  deferred    Stroke Scales  NIHSS  1a. Level of Consciousness 0-->Alert, keenly responsive   1b. LOC Questions 0-->Answers both questions correctly   1c. LOC Commands 0-->Performs both tasks correctly   2.   Best Gaze 1-->Partial gaze palsy, gaze is abnormal in one or both eyes, but forced deviation or total gaze paresis is not present   3.   Visual 0-->No visual loss   4.   Facial Palsy 0-->Normal symmetrical movements   5a. Motor Arm, Left 0-->No drift, limb holds 90 (or 45) degrees for full 10 secs   5b. Motor Arm, Right 0-->No drift, limb holds 90 (or 45) degrees for full 10 secs   6a. Motor Leg, Left 0-->No drift, leg holds 30 degree position for full 5 secs   6b. Motor Leg, right 0-->No drift, leg holds 30 degree position for full 5 secs   7.   Limb Ataxia 1-->Present in one limb   8.   Sensory 1-->Mild-to-moderate sensory loss, patient feels pinprick is less sharp or is dull on the affected side, or there is a loss of superficial pain with pinprick, but patient is aware of being touched   9.   Best Language 0-->No aphasia, normal   10. Dysarthria 0-->Normal   11. Extinction and Inattention   "0-->No abnormality   Total 3 (03/19/24 1134)     Imaging  I personally reviewed all imaging; relevant findings per HPI.    Labs Data   CBC  Recent Labs   Lab 03/18/24  1243   WBC 5.5   RBC 4.85   HGB 15.8   HCT 46.6        Basic Metabolic Panel   Recent Labs   Lab 03/19/24  1335 03/18/24  1243   NA  --  141   POTASSIUM  --  4.4   CHLORIDE  --  103   CO2  --  29   BUN  --  17.6   CR  --  1.01   * 125*   CATERINA  --  9.7     Liver Panel  No results for input(s): \"PROTTOTAL\", \"ALBUMIN\", \"BILITOTAL\", \"ALKPHOS\", \"AST\", \"ALT\", \"BILIDIRECT\" in the last 168 hours.  INR    Recent Labs   Lab Test 03/19/24  0655 03/18/24  2146 03/18/24  1243   INR 2.41* 2.30* 2.22*           Stroke Consult Data Data   This was a non-emergent, non-telestroke consult.  I have personally spent a total of 50 minutes providing care today, time spent in reviewing medical records and devising the plan as recorded above.   "

## 2024-03-19 NOTE — PROGRESS NOTES
March 19, 2024  1200    Observation Goals: -diagnostic tests and consults completed and resulted, Nurse to notify provider when observation goals have been met and patient is ready for discharge.  -diagnostic tests and consults completed and resulted  Not met, pending TTE, PT/OT evals, final stroke neuro recommendations    Nurse to notify provider when observation goals have been met and patient is ready for discharge

## 2024-03-20 ENCOUNTER — APPOINTMENT (OUTPATIENT)
Dept: CARDIOLOGY | Facility: CLINIC | Age: 69
DRG: 065 | End: 2024-03-20
Payer: COMMERCIAL

## 2024-03-20 ENCOUNTER — APPOINTMENT (OUTPATIENT)
Dept: PHYSICAL THERAPY | Facility: CLINIC | Age: 69
DRG: 065 | End: 2024-03-20
Payer: COMMERCIAL

## 2024-03-20 ENCOUNTER — DOCUMENTATION ONLY (OUTPATIENT)
Dept: ANTICOAGULATION | Facility: CLINIC | Age: 69
End: 2024-03-20

## 2024-03-20 ENCOUNTER — APPOINTMENT (OUTPATIENT)
Dept: OCCUPATIONAL THERAPY | Facility: CLINIC | Age: 69
DRG: 065 | End: 2024-03-20
Payer: COMMERCIAL

## 2024-03-20 VITALS
TEMPERATURE: 97.6 F | RESPIRATION RATE: 16 BRPM | OXYGEN SATURATION: 94 % | SYSTOLIC BLOOD PRESSURE: 131 MMHG | HEART RATE: 69 BPM | DIASTOLIC BLOOD PRESSURE: 83 MMHG

## 2024-03-20 DIAGNOSIS — Z95.2 AORTIC VALVE PROSTHESIS PRESENT: ICD-10-CM

## 2024-03-20 DIAGNOSIS — I48.0 PAF (PAROXYSMAL ATRIAL FIBRILLATION) (H): Primary | ICD-10-CM

## 2024-03-20 LAB
GLUCOSE BLDC GLUCOMTR-MCNC: 122 MG/DL (ref 70–99)
GLUCOSE BLDC GLUCOMTR-MCNC: 97 MG/DL (ref 70–99)
INR PPP: 2.63 (ref 0.85–1.15)
LVEF ECHO: NORMAL

## 2024-03-20 PROCEDURE — 255N000002 HC RX 255 OP 636: Performed by: INTERNAL MEDICINE

## 2024-03-20 PROCEDURE — 97116 GAIT TRAINING THERAPY: CPT | Mod: GP

## 2024-03-20 PROCEDURE — 250N000013 HC RX MED GY IP 250 OP 250 PS 637

## 2024-03-20 PROCEDURE — 36415 COLL VENOUS BLD VENIPUNCTURE: CPT | Performed by: INTERNAL MEDICINE

## 2024-03-20 PROCEDURE — 85610 PROTHROMBIN TIME: CPT | Performed by: INTERNAL MEDICINE

## 2024-03-20 PROCEDURE — 99239 HOSP IP/OBS DSCHRG MGMT >30: CPT | Performed by: INTERNAL MEDICINE

## 2024-03-20 PROCEDURE — 999N000208 ECHOCARDIOGRAM COMPLETE

## 2024-03-20 PROCEDURE — 97535 SELF CARE MNGMENT TRAINING: CPT | Mod: GO

## 2024-03-20 PROCEDURE — 250N000013 HC RX MED GY IP 250 OP 250 PS 637: Performed by: INTERNAL MEDICINE

## 2024-03-20 PROCEDURE — 93306 TTE W/DOPPLER COMPLETE: CPT | Mod: 26 | Performed by: INTERNAL MEDICINE

## 2024-03-20 RX ORDER — WARFARIN SODIUM 5 MG/1
TABLET ORAL
Status: SHIPPED
Start: 2024-03-20 | End: 2024-07-01

## 2024-03-20 RX ORDER — WARFARIN SODIUM 6 MG/1
6 TABLET ORAL
Status: DISCONTINUED | OUTPATIENT
Start: 2024-03-20 | End: 2024-03-20 | Stop reason: HOSPADM

## 2024-03-20 RX ADMIN — LEVOTHYROXINE SODIUM 175 MCG: 100 TABLET ORAL at 08:11

## 2024-03-20 RX ADMIN — LIDOCAINE 1 PATCH: 4 PATCH TOPICAL at 08:12

## 2024-03-20 RX ADMIN — VENLAFAXINE HYDROCHLORIDE 225 MG: 150 CAPSULE, EXTENDED RELEASE ORAL at 08:11

## 2024-03-20 RX ADMIN — PREGABALIN 100 MG: 100 CAPSULE ORAL at 08:11

## 2024-03-20 RX ADMIN — PREGABALIN 100 MG: 100 CAPSULE ORAL at 15:30

## 2024-03-20 RX ADMIN — ASPIRIN 81 MG: 81 TABLET, COATED ORAL at 08:11

## 2024-03-20 RX ADMIN — ACETAMINOPHEN 1000 MG: 500 TABLET, FILM COATED ORAL at 08:11

## 2024-03-20 RX ADMIN — PROPAFENONE HYDROCHLORIDE 150 MG: 150 TABLET, FILM COATED ORAL at 15:30

## 2024-03-20 RX ADMIN — PROPAFENONE HYDROCHLORIDE 150 MG: 150 TABLET, FILM COATED ORAL at 06:39

## 2024-03-20 RX ADMIN — HUMAN ALBUMIN MICROSPHERES AND PERFLUTREN 9 ML: 10; .22 INJECTION, SOLUTION INTRAVENOUS at 14:32

## 2024-03-20 ASSESSMENT — ACTIVITIES OF DAILY LIVING (ADL)
ADLS_ACUITY_SCORE: 35
DEPENDENT_IADLS:: MONEY MANAGEMENT
ADLS_ACUITY_SCORE: 35

## 2024-03-20 NOTE — PLAN OF CARE
Nida Rene, RN on 3/20/2024 at 6:00 AM     Reason for Admission: facial numbness- R stroke  Cognitive/Mentation: A/Ox 4     Neuros/CMS: Intact ex L ear numbness  VS: VSS on RA.   Tele: NSR.  GI: BS active, + flatus, Continent.  :  Continent.  Pulmonary: LS clear.  Pain: denies.   Drains/Lines: PIV SL  Skin: intact, scars on back   Activity: SBA  Diet: reg with thin liquids. Takes pills whole.      Therapies recs: TBD  Discharge: TBD  Aggression Stoplight Tool: green  End of shift summary: Stable overnight

## 2024-03-20 NOTE — DISCHARGE INSTRUCTIONS
Your risk factors for stroke or TIA (transient ischemic attack):     Your Risk Factors Your Results Goals   [] High blood pressure BP: 131/83 (03/20/24 1544) Less than 120/80   [] Cholesterol          Total 3/19/2024: 136 mg/dL   Less than 150    Triglycerides   3/19/2024: 127 mg/dL Less than 150    LDL 3/19/2024: 75 mg/dL    Less than 70    HDL 3/19/2024: 36 mg/dL         Greater than 40 (men)  Greater than 50 (women)   [] Diabetes                A1C 3/19/2024: 5.7 % Less than 5.7   [] Atrial fibrillation Atrial fibrillation noted on cardiac monitoring Manage per physician orders   [] Smoking/tobacco use   Tobacco Use      Smoking status: Never      Smokeless tobacco: Never   Quit smoking and tobacco   [] Overweight There is no height or weight on file to calculate BMI.  Less than 25     Other risk factors include: carotid (neck) artery disease, other heart diseases, prior stroke or TIA, poor diet, lack of exercise, and excessive alcohol consumption.     [] Written stroke educational materials given to patient including:   - Learning about BE FAST: Stroke Warning Signs and Learning about Risk Factors for Stroke (Healthwise)   - Understanding Stroke: Key Resources After a Stroke (FOD #808695)       Know the warning signs and symptoms of stroke: BE FAST     B = Balance loss   E = Eyesight changes   F = Facial droop or numbness   A = Arm or leg weakness   S = Speech difficulty, slurred speech   T = Time to call 911 for help

## 2024-03-20 NOTE — PROGRESS NOTES
[facility]    Stroke Progress Note    Interval Events  No acute events overnight.  Melvi reports improvement in his left ear numbness.  The numbness localizes slowly to his left ear.  He denies any recurrence of numbness, weakness, or new neurologic symptoms.  Discussed that his INR goal is 2.5-3.5, however should aim between 2.8 and 3.5 to ensure that he does not become subtherapeutic and thus increases risk for stroke.  Discussed importance of blood pressure control, Mediterranean diet, and evaluation for obstructive sleep apnea (endorses snoring, deviated septum, daytime fatigue but denies apneic events).  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    INR: 2.63.    HPI Summary  Todd S Aschoff is a right handed 68 year old male with a PMH significant for atrial fibrillation (on warfarin), mechanical aortic valve, thoracic aortic dissection, HLD, depression, history of stroke. Presented on 3/18 with left facial numbness. He woke up yesterday (3/18) at 7 AM with no symptoms and then went back to sleep. When he awoke at 10 AM he had new new left ear numbness that then spread to include the left side of his face and left scalp. The numbnesshas has been persisting and progressed to include the left truncal area.  He denies any left arm or leg numbness, unilateral weakness, speech changes, or vision changes.  Then while en route to the emergency department he developed a headache.  The headache originally localized to the frontal area, but then migrated to the left side of his head.  Nicko denies similar episodes of left facial numbness. Of note, Nicko's most recent stroke presented with hearing loss. He currently takes warfarin 5 mg on MWThF and 7.5 mg on Sat/Tues. Nicko reports taking his warfarin yesterday evening and denies any recent skipped doses. MRI Brain revealed ischemic infarcts to the lateral right cerebellum and bilateral cortical  parietal lobes   Presenting blood pressure was 105/60. INR 2.22.       3/19: Only left ear numbness and left side of neck remained. INR today 2.41.      Stroke Evaluation Summarized  MRI/Head CT MRI Brain: Small recent ischemic infarcts of the lateral right cerebellum, bilateral cortical parietal lobe     CTH: No evidence of hemorrhage or acute intracranial pathology. Chronic infarcts of the bilateral cerebellar hemispheres.    Intracranial Vasculature CTA Head: No proximal large vessel occlusion. Moderate focal stenosis in the P3 segment of the right PCA.    Cervical Vasculature CTA Neck: No LVO, significant stenosis or dissection.       Echocardiogram Pending    EKG/Telemetry Sinus rhythm with Premature atrial complexes   Left axis deviation    Other Testing INR: 2.22 -> 2.30 -> 2.41      LDL  3/19/2024: 75 mg/dL   A1C  3/19/2024: 5.7 %   Troponin 3/18/2024: 13 ng/L      Impression  Acute ischemic stroke of the right lateral cerebellum and bilateral cortical parietal lobes due to an embolic sources, likely cardioembolism in the setting of known atrial fibrillation with subtherapeutic INR     Recommendations   Acute stroke management  - Neuro checks and vital signs every 4 hours, Q8 overnight   - BP goal Normotension  - PT/OT/ST consults  - Bedside Glucose Monitoring  - Euthermia, Euglycemia      Diagnostic testing  - Continue telemetry while inpatient  - Awaiting echocardiogram     Secondary stroke prevention  - Continue Warfarin, PTA INR goal 2.5 -3.5. May be reasonable to aim consistently between 2.8 - 3.5 to minimize the chances of being subtherapeutic   - LDL 75 (goal 40-70); Continue PTA Lipitor 40mg with ongoing outpatient titration to achieve LDL goal   - Long term goal BP is <130/80 to be achieved as outpatient within several weeks. Tighter control associated with improved vascular outcomes; recommend home blood pressure monitoring and keeping a BP log for primary care follow up  - Blood glucose  "monitoring, Hgb A1c 5.7%, (goal <7% for secondary stroke prevention), follow-up with PCP  - Encourage Mediterranean diet and daily exercise     Patient Follow-up    - in the next 1-2 week(s) with PCP  - in 6-8 weeks with Teresita Woods PA-C or any stroke THOR ([phone]) (ordered)  - Referral for outpatient sleep study to evaluate for obstructive sleep apnea (Ordered)    Thank you for this consult. We will follow peripherally for results of the echocardiogram and if no concerns then will sign off. Please contact us with any additional questions.    Teresita Phipps PA-C  Vascular Neurology    To page me or covering stroke neurology team member, click here: [facility]  Choose \"On Call\" tab at top, then select \"NEUROLOGY/ALL SITES\" from middle drop-down box, press Enter, then look for \"stroke\" or \"telestroke\" for your site.  ______________________________________________________    Clinically Significant Risk Factors Present on Admission               # Drug Induced Coagulation Defect: home medication list includes an anticoagulant medication    # Hypertension: Noted on problem list      # Severe Obesity: Estimated body mass index is 44.49 kg/m  as calculated from the following:    Height as of 3/13/24: 1.803 m (5' 11\").    Weight as of 3/13/24: 144.7 kg (319 lb).              Medications   Scheduled Meds   acetaminophen  1,000 mg Oral BID    aspirin  81 mg Oral Daily    atorvastatin  40 mg Oral QPM    donepezil  10 mg Oral At Bedtime    levothyroxine  175 mcg Oral Daily    lidocaine  1 patch Transdermal Q24H    pregabalin  100 mg Oral TID    propafenone  150 mg Oral Q8H RANGEL    venlafaxine  225 mg Oral Daily    Warfarin Therapy Reminder  1 each Oral See Admin Instructions       Infusion Meds   - MEDICATION INSTRUCTIONS -      - MEDICATION INSTRUCTIONS -      - MEDICATION INSTRUCTIONS -         PRN Meds  acetaminophen **OR** acetaminophen, cyclobenzaprine, diazepam, - MEDICATION INSTRUCTIONS -, ondansetron **OR** " --- ondansetron, - MEDICATION INSTRUCTIONS -, - MEDICATION INSTRUCTIONS -, senna-docusate **OR** senna-docusate       PHYSICAL EXAMINATION  Temp:  [97.4  F (36.3  C)-99.2  F (37.3  C)] 98.5  F (36.9  C)  Pulse:  [76-86] 80  Resp:  [16-17] 17  BP: ()/(62-90) 124/80  SpO2:  [95 %-98 %] 98 %      General Exam  General:  patient lying in bed without any acute distress    HEENT:  normocephalic/atraumatic  Pulmonary:  no respiratory distress    Neuro Exam  Mental Status:  alert, oriented to age and month, follows commands, speech clear and fluent, naming and repetition normal  Cranial Nerves:  visual fields intact, right partial gaze palsy (right eye able to move medially but not laterally), left eye EOM full,  facial sensation intact and symmetric, left eye decreased sensation (90% in comparison to 100% right), facial movements symmetric, hearing not formally tested but intact to conversation, no dysarthria, shoulder shrug strong bilaterally, tongue protrusion midline  Motor:  no abnormal movements, able to move all limbs spontaneously, strength 5/5 throughout upper extremities, no pronator drift, no left drfit  Sensory:  light touch sensation intact and symmetric throughout upper and lower extremities, no extinction on double simultaneous stimulation   Coordination:  normal finger-to-nose and heel-to-shin bilaterally without dysmetria  Station/Gait:  deferred    Stroke Scales  NIHSS  1a. Level of Consciousness 0-->Alert, keenly responsive   1b. LOC Questions 0-->Answers both questions correctly   1c. LOC Commands 0-->Performs both tasks correctly   2.   Best Gaze 1-->Partial gaze palsy, gaze is abnormal in one or both eyes, but forced deviation or total gaze paresis is not present (chronic right gaze palsy)   3.   Visual 0-->No visual loss   4.   Facial Palsy 0-->Normal symmetrical movements   5a. Motor Arm, Left 0-->No drift, limb holds 90 (or 45) degrees for full 10 secs   5b. Motor Arm, Right 0-->No drift, limb  "holds 90 (or 45) degrees for full 10 secs   6a. Motor Leg, Left 0-->No drift, leg holds 30 degree position for full 5 secs   6b. Motor Leg, right 0-->No drift, leg holds 30 degree position for full 5 secs   7.   Limb Ataxia 0-->Absent   8.   Sensory 0-->Normal, no sensory loss   9.   Best Language 0-->No aphasia, normal   10. Dysarthria 0-->Normal   11. Extinction and Inattention  0-->No abnormality   Total 1 (03/20/24 1232)     Imaging  I personally reviewed all imaging; relevant findings per HPI.     Lab Results Data   CBC  Recent Labs   Lab 03/18/24  1243   WBC 5.5   RBC 4.85   HGB 15.8   HCT 46.6        Basic Metabolic Panel    Recent Labs   Lab 03/19/24  1335 03/18/24  1243   NA  --  141   POTASSIUM  --  4.4   CHLORIDE  --  103   CO2  --  29   BUN  --  17.6   CR  --  1.01   * 125*   CATERINA  --  9.7     Liver Panel  No results for input(s): \"PROTTOTAL\", \"ALBUMIN\", \"BILITOTAL\", \"ALKPHOS\", \"AST\", \"ALT\", \"BILIDIRECT\" in the last 168 hours.  INR    Recent Labs   Lab Test 03/19/24  0655 03/18/24  2146 03/18/24  1243   INR 2.41* 2.30* 2.22*      Lipid Profile    Recent Labs   Lab Test 03/19/24  0833 10/31/23  1749 09/15/23  1027   CHOL 136 160 161   HDL 36* 42 43   LDL 75 93 81   TRIG 127 123 187*     A1C    Recent Labs   Lab Test 03/19/24  0833 10/31/23  1749 09/15/23  1027   A1C 5.7* 5.7* 5.6     Troponin    Recent Labs   Lab 03/18/24  1243   CTROPT 13          Data     I have personally spent a total of 35 minutes providing care today, time spent in reviewing medical records and devising the plan as recorded above.   "

## 2024-03-20 NOTE — DISCHARGE SUMMARY
"[facility]  Hospitalist Discharge Summary      Date of Admission:  3/18/2024  Date of Discharge:  3/20/2024  Discharging Provider: Cindy Reyes MD  Discharge Service: Hospitalist Service    Discharge Diagnoses   Acute ischemic stroke of the right lateral cerebellum and bilateral cortical parietal lobes, likely cardioembolic   Paroxysmal atrial fibrillation  Hypothyroid  Mechanical AV replacement 1992, on warfarin  Mild cognitive impairment  Depression, anxiety  Chronic pain  History of CVA x 4    Clinically Significant Risk Factors     # Severe Obesity: Estimated body mass index is 44.49 kg/m  as calculated from the following:    Height as of 3/13/24: 1.803 m (5' 11\").    Weight as of 3/13/24: 144.7 kg (319 lb).       Follow-ups Needed After Discharge   Follow-up Appointments     Follow-up and recommended labs and tests       Follow up with primary care provider, Herve Morgan, within 7 days to   evaluate medication change, for hospital follow- up, and regarding new   diagnosis.  The following labs/tests are recommended: check INR on or   before Monday, 3/25/2024 with further warfarin doses per anticoagulation   clinic.      Please note stroke neurology recommendation for new INR goal 2.8-3.5.          Unresulted Labs Ordered in the Past 30 Days of this Admission       No orders found from 2/17/2024 to 3/19/2024.            Discharge Disposition   Discharged to home  Condition at discharge: Stable    Hospital Course   Todd S Aschoff is a 68 year old male with multiple medical problems including history of aortic dissection repair and mechanical aortic valve replacement in 1992 and paroxysmal atrial fibrillation on warfarin, chronic back pain/neuropathy, HTN, HLD, (dents to the emergency department with left facial numbness.  Stroke workup including head CT, CTAHN showed no acute changes.     Principal Problem:   Acute ischemic stroke of the right lateral cerebellum and bilateral cortical " "parietal lobes, likely cardioembolic  Brain MRI shows, \"small recent ischemic infarcts at the lateral right cerebellar hemisphere and at the posterolateral right frontal lobe consistent with embolic infarcts from a central embolic source.\"  Stroke neurology consult requested, I appreciate Teresita Phipps's evaluation and recommendations.    Per them, \"Dr. Marcus and I discussed with Nicko suspected etiology of his stroke was due to subtherapeutic INR and new recommendation for INR goal to be between 2.8- 3.5.\"  Also per stroke neurology, begin aspirin 81 mg daily until INR is within goal then discontinue aspirin.  They discontinued aspirin 3/20.    Active Problems:    PAF (paroxysmal atrial fibrillation) (H)  Continue propafenone for ventricular rate/rhythm control  Continue warfarin for thromboembolism prevention, pharmacy to dose.  See further discussion below re: INR      Hypothyroidism  Continue thyroid hormone      Mechanical AV Replacement 1992 -- on Warfarin   Continue warfarin, Pharm.D. to dose  See discussion above regarding new INR goal      Memory difficulties  Continue Aricept      Depression, anxiety  Continue PTA Effexor, guanfacine, Valium       Chronic pain  Continue PTA Lyrica      Hx of CVA x 4, 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  See discussion above    Consultations This Hospital Stay   PHARMACY TO DOSE WARFARIN  NEUROLOGY IP STROKE CONSULT  SPEECH LANGUAGE PATH ADULT IP CONSULT  PHARMACY IP CONSULT  PHARMACY IP CONSULT  PHARMACY IP CONSULT  PHYSICAL THERAPY ADULT IP CONSULT  OCCUPATIONAL THERAPY ADULT IP CONSULT  REHAB ADMISSIONS LIAISON IP CONSULT  CARE MANAGEMENT / SOCIAL WORK IP CONSULT  SMOKING CESSATION PROGRAM IP CONSULT    Code Status   Full Code    Time Spent on this Encounter   I, Cindy Reyes MD, personally saw the patient today and spent greater than 30 minutes discharging this patient.       Cindy Reyes MD  [facility]  [address] [place] " [address]  Phone: [phone]  ______________________________________________________________________    Physical Exam   Vital Signs: Temp: 97.6  F (36.4  C) Temp src: Oral BP: 131/83 Pulse: 69   Resp: 16 SpO2: 94 % O2 Device: None (Room air)    Weight: 0 lbs 0 oz  I saw and examined the patient on the date of discharge.           Primary Care Physician   Herve Morgan    Discharge Orders      Occupational Therapy  Referral      Physical Therapy  Referral      Adult Sleep Eval & Management Referral      Reason for your hospital stay    You had 2 new, small strokes, probably because your INR was slightly subtherapeutic.  Your warfarin dose has been increased.     Follow-up and recommended labs and tests     Follow up with primary care provider, Herve Morgan, within 7 days to evaluate medication change, for hospital follow- up, and regarding new diagnosis.  The following labs/tests are recommended: check INR on or before Monday, 3/25/2024 with further warfarin doses per anticoagulation clinic.     Activity    Your activity upon discharge: activity as tolerated     Diet    Follow this diet upon discharge: Orders Placed This Encounter      Regular Diet Adult     Stroke Hospital Follow Up (for neurologist use only)    If an appointment is not available with me in a reasonable timeframe (ie within 2-3 weeks of requested timeframe), okay to schedule with any stroke THOR.  [facility] will call you to coordinate care as prescribed by your provider. If you don t hear from a representative within 2 business days, please call [phone].         Significant Results and Procedures   Most Recent 3 CBC's:  Recent Labs   Lab Test 03/18/24  1243 11/03/23  1526 11/01/23  0809   WBC 5.5 6.3 5.5   HGB 15.8 17.0 15.4   MCV 96 96 98    231 174     Most Recent 3 BMP's:  Recent Labs   Lab Test 03/20/24  1202 03/20/24  0750 03/19/24  1335 03/18/24  1243 11/03/23  1526 11/02/23  0757 11/01/23  1518  11/01/23  0809   NA  --   --   --  141 142  --   --  137   POTASSIUM  --   --   --  4.4 4.7 4.0  --  4.4   CHLORIDE  --   --   --  103 104  --   --  103   CO2  --   --   --  29 27  --   --  29   BUN  --   --   --  17.6 14.5  --   --  16.8   CR  --   --   --  1.01 1.00  --   --  0.95   ANIONGAP  --   --   --  9 11  --   --  5*   CATERINA  --   --   --  9.7 9.4  --   --  8.8   * 97 110* 125* 114*  101*  --    < > 95    < > = values in this interval not displayed.     Most Recent Hemoglobin A1c:  Recent Labs   Lab Test 03/19/24  0833   A1C 5.7*   ,   Results for orders placed or performed during the hospital encounter of 03/18/24   CT Head w/o Contrast    Narrative    EXAM: CT HEAD W/O CONTRAST  3/18/2024 12:54 PM     HISTORY:  Code Stroke to evaluate for potential thrombolysis and  thrombectomy. PLEASE READ IMMEDIATELY.       COMPARISON:  No prior similar studies    TECHNIQUE: Using multidetector thin collimation helical acquisition  technique, axial, coronal and sagittal CT images from the skull base  to the vertex were obtained without intravenous contrast.   (topogram) image(s) also obtained and reviewed. Dose reduction  techniques were performed.    FINDINGS:  No intracranial hemorrhage, mass effect, or midline shift. No acute  loss of gray-white matter differentiation in the cerebral hemispheres.  Ventricles are proportionate to the cerebral sulci. Clear basal  cisterns. Chronic infarcts at the right temporo-occipital junction and  bilateral cerebellar hemispheres.     The bony calvaria and the bones of the skull base are normal. The  visualized portions of the paranasal sinuses and mastoid air cells are  clear. Grossly normal orbits.       Impression    IMPRESSION:   1. No acute intracranial pathology.   2. Chronic infarcts in bilateral cerebellar hemispheres and at the  right tempo-occipital junction.    JOAO SCHULTZ MD         SYSTEM ID:  [id number]   CTA Head Neck with Contrast    Narrative     EXAM: CTA HEAD NECK W CONTRAST  3/18/2024 12:58 PM     HISTORY:  Code Stroke to evaluate for potential thrombolysis and  thrombectomy. PLEASE READ IMMEDIATELY.       COMPARISON:  CT 10/31/2020    TECHNIQUE:    HEAD and NECK CTA: During rapid bolus intravenous injection of  nonionic contrast material, axial images were obtained using thin  collimation multidetector helical technique from the base of the upper  aortic arch through the [place] of [place]. This CT angiogram data was  reconstructed at thin intervals with mild overlap. Images were sent to  the 3D workstation, and 3D reconstructions were obtained. The axial  source images, multiplanar reformations, 3D reconstructions in both  maximum intensity projection display and volume rendered models were  reviewed, with reconstructions performed by the technologist and the  radiologist.    CONTRAST: 67 mL [id number]    FINDINGS:    Head CTA demonstrates no intracranial arterial aneurysm or large  vessel occlusion. Unchanged focal moderate stenosis in the P3 segment  of the right PCA.    Neck CTA demonstrates no internal carotid artery stenosis. No  vertebral artery stenosis. Patent and conventional aortic arch  branching pattern.    No acute finding in the visualized neck soft tissues, or in the  superior mediastinum/thorax. Postsurgical changes of instrumented  anterior spinal fusion C4-C6.      Impression    IMPRESSION:    1. Head CTA demonstrates no aneurysm or large vessel occlusion.  Unchanged focal moderate stenosis in the P3 segment of the right PCA.  2. Neck CTA demonstrates no stenosis of the major cervical arteries.    JOAO SCHULTZ MD         SYSTEM ID:  [id number]   MR Brain w/o & w Contrast    Narrative    MRI OF THE BRAIN WITHOUT AND WITH CONTRAST 3/18/2024 4:04 PM     COMPARISON: Head CT same day.    HISTORY:  Left facial and left trunk numbness, history of mechanical  valve.     TECHNIQUE: Axial diffusion-weighted with ADC map, axial T2-weighted  with  fat saturation, axial T1-weighted, axial turboFLAIR and coronal  T1-weighted images of the brain were acquired without intravenous  contrast.  Following intravenous administration of gadolinium (14 mL  Gadavist), axial T1-weighted images of the brain were acquired.     FINDINGS: There are small recent ischemic infarcts at the lateral  aspect of the right cerebellar hemisphere and at the posterolateral  right frontal lobe consistent with embolic infarcts from a central  embolic source. There is mild diffuse cerebral volume loss. There are  patchy periventricular areas of abnormal T2 signal hyperintensity in  the cerebral white matter bilaterally that are consistent with sequela  of chronic small vessel ischemic disease. Presumed chronic ischemic  infarct at the temporo-occipital junction on the right again noted.    The ventricles and basal cisterns are within normal limits in  configuration given the degree of cerebral volume loss.  There is no  midline shift.  There are no extra-axial fluid collections.  There is  no evidence for stroke or acute intracranial hemorrhage.  There is no  abnormal contrast enhancement in the brain or its coverings.    There is no sinusitis or mastoiditis.      Impression    IMPRESSION:   1. Small recent ischemic infarcts at the lateral right cerebellar  hemisphere and at the posterolateral right frontal lobe consistent  with embolic infarcts from a central embolic source.  2. Diffuse cerebral volume loss and cerebral white matter changes  consistent with chronic small vessel ischemic disease.    ROXY MEJIA MD         SYSTEM ID:  [id number]   Echocardiogram Complete     Value    LVEF  60-65%    Narrative    [id number]  [id number]  [id number]^JOSÉ MIGUEL BERRIOS^ARMEN     [facility]  Echocardiography Laboratory  [address] [place] [address]     Name: ASCHOFF, TODD S  MRN: [id number]  : 1955  Study Date: 2024 01:56 PM  Age: 68 yrs  Gender:  Male  Patient Location: [facility]  Reason For Study: CVA  Ordering Physician: ARMEN COSTA  Referring Physician: ARMEN COSTA  Performed By: Thuy Parnell     BSA: 2.6 m2  Height: 71 in  Weight: 319 lb  HR: 68  BP: 113/77 mmHg  ______________________________________________________________________________  Procedure  Complete Echo Adult. Optison (NDC #9048-2079) given intravenously.  ______________________________________________________________________________  Interpretation Summary     The visual ejection fraction is 60-65%.  Left ventricular systolic function is normal.  There is a mechanical aortic valve.  The gradient is normal for this prosthetic aortic valve.  The study was technically difficult.  ______________________________________________________________________________  Left Ventricle  The left ventricle is normal in size. There is normal left ventricular wall  thickness. Diastolic Doppler findings (E/E' ratio and/or other parameters)  suggest left ventricular filling pressures are indeterminate. The visual  ejection fraction is 60-65%. Left ventricular systolic function is normal.     Right Ventricle  The right ventricle is normal in size and function.     Atria  Normal left atrial size. Right atrial size is normal. There is no color  Doppler evidence of an atrial shunt.     Mitral Valve  There is mild to moderate mitral annular calcification. There is trace mitral  regurgitation.     Tricuspid Valve  There is trace tricuspid regurgitation.     Aortic Valve  No aortic regurgitation is present. The peak AoV pressure gradient is 28.1  mmHg. The mean AoV pressure gradient is 15.0 mmHg. There is a mechanical  aortic valve. The gradient is normal for this prosthetic aortic valve.     Pulmonic Valve  There is trace pulmonic valvular regurgitation. Normal pulmonic valve  velocity.     Vessels  Normal size ascending aorta. Inferior vena cava not well visualized for  estimation of right atrial  pressure.     Pericardium  There is no pericardial effusion.     Rhythm  Sinus rhythm was noted.  ______________________________________________________________________________  MMode/2D Measurements & Calculations  IVSd: 0.80 cm     LVIDd: 5.0 cm  LVIDs: 3.5 cm  LVPWd: 1.0 cm  FS: 29.7 %  LV mass(C)d: 158.2 grams  LV mass(C)dI: 61.5 grams/m2  asc Aorta Diam: 3.0 cm  LVOT diam: 2.0 cm  LVOT area: 3.1 cm2  Asc Ao diam index BSA (cm/m2): 1.2  Asc Ao diam index Ht(cm/m): 1.7  LA Volume (BP): 37.0 ml  LA Volume Index (BP): 14.4 ml/m2  RV Base: 3.0 cm     RWT: 0.40  TAPSE: 1.7 cm     Doppler Measurements & Calculations  MV E max enrike: 106.0 cm/sec  MV A max enrike: 91.2 cm/sec  MV E/A: 1.2  MV dec time: 0.23 sec  Ao V2 max: 265.0 cm/sec  Ao max P.1 mmHg  Ao V2 mean: 174.0 cm/sec  Ao mean PG: 15.0 mmHg  Ao V2 VTI: 52.6 cm  SHAWN(I,D): 1.1 cm2  SHAWN(V,D): 1.0 cm2  Ao acc time: 0.12 sec  LV V1 max PG: 3.0 mmHg  LV V1 max: 86.5 cm/sec  LV V1 VTI: 19.3 cm  SV(LVOT): 60.0 ml  SI(LVOT): 23.3 ml/m2  PA acc time: 0.18 sec  PI end-d enrike: 89.0 cm/sec  AV Enrike Ratio (DI): 0.33  SHAWN Index (cm2/m2): 0.44  E/E' av.4  Lateral E/e': 8.5     Medial E/e': 14.3  RV S Enrike: 11.7 cm/sec     ______________________________________________________________________________  Report approved by: Nu Gadnhi 2024 03:50 PM           *Note: Due to a large number of results and/or encounters for the requested time period, some results have not been displayed. A complete set of results can be found in Results Review.       Discharge Medications   Current Discharge Medication List        START taking these medications    Details   warfarin ANTICOAGULANT (COUMADIN) 5 MG tablet Take 1.5 tablets (7.5 mg) by mouth every Tue, Thurs and Sat; Take 1 tablet (5 mg) by mouth all other days of the week Or as directed.    Associated Diagnoses: Cerebrovascular accident (CVA), unspecified mechanism (H); PAF (paroxysmal atrial fibrillation) (H);  Aortic valve prosthesis present           CONTINUE these medications which have NOT CHANGED    Details   acetaminophen (TYLENOL) 500 MG tablet Take 1,000 mg by mouth 2 times daily      atorvastatin (LIPITOR) 40 MG tablet Take 1 tablet (40 mg) by mouth every evening  Qty: 90 tablet, Refills: 1    Associated Diagnoses: Right temporal lobe infarction (H)      azelastine (ASTELIN) 0.1 % nasal spray Spray 1 spray into both nostrils daily      cyclobenzaprine (FLEXERIL) 10 MG tablet TAKE ONE TABLET BY MOUTH THREE TIMES DAILY AS NEEDED FOR MUSCLE SPASM.  Qty: 270 tablet, Refills: 3    Associated Diagnoses: Lumbar radiculopathy      diazepam (VALIUM) 5 MG tablet Take 1 tablet by mouth nightly as needed for anxiety  Qty: 30 tablet, Refills: 0    Associated Diagnoses: Back muscle spasm      donepezil (ARICEPT) 10 MG tablet Take 1 tablet (10 mg) by mouth At Bedtime  Qty: 90 tablet, Refills: 3    Comments: Profile Rx: patient will contact pharmacy when needed  Associated Diagnoses: Memory difficulties      folic acid (FOLVITE) 400 MCG tablet Take 400 mcg by mouth daily      guanFACINE (TENEX) 1 MG tablet Take 1 tablet (1 mg) by mouth At Bedtime  Qty: 90 tablet, Refills: 1    Associated Diagnoses: Major depressive disorder, recurrent episode, moderate (H)      levothyroxine (SYNTHROID/LEVOTHROID) 175 MCG tablet Take 1 tablet (175 mcg) by mouth daily  Qty: 90 tablet, Refills: 3    Associated Diagnoses: Acquired hypothyroidism      lisinopril (ZESTRIL) 10 MG tablet Take 1 tablet (10 mg) by mouth daily  Qty: 90 tablet, Refills: 3    Comments: Profile Rx: patient will contact pharmacy when needed  Associated Diagnoses: Essential hypertension      mirabegron (MYRBETRIQ) 50 MG 24 hr tablet Take 1 tablet (50 mg) by mouth daily  Qty: 90 tablet, Refills: 3    Associated Diagnoses: OAB (overactive bladder); BPH with obstruction/lower urinary tract symptoms      pregabalin (LYRICA) 100 MG capsule Take 1 capsule (100 mg) by mouth 3 times  "daily Do not take if sleepy/sedated.  Qty: 270 capsule, Refills: 3    Associated Diagnoses: Chronic bilateral low back pain without sciatica      propafenone (RYTHMOL) 150 MG TABS tablet Take 1 tablet (150 mg) by mouth every 8 hours  Qty: 270 tablet, Refills: 4    Associated Diagnoses: Chronic atrial fibrillation (H)      venlafaxine (EFFEXOR XR) 75 MG 24 hr capsule Take 3 capsules (225 mg) by mouth daily  Qty: 270 capsule, Refills: 3    Associated Diagnoses: Depression, unspecified depression type           Allergies   Allergies   Allergen Reactions    Blood Transfusion Related (Informational Only) Other (See Comments)     Patient has a history of a clinically significant antibody against RBC antigens.  A delay in compatible RBCs may occur.      Ciprofloxacin      \"Went nuts\"    Gabapentin      Severe behavioral disturbances     "

## 2024-03-20 NOTE — PLAN OF CARE
Physical Therapy Discharge Summary    Reason for therapy discharge:    All goals and outcomes met, no further needs identified.    Progress towards therapy goal(s). See goals on Care Plan in [facility] electronic health record for goal details.  Goals met    Therapy recommendation(s):    Continued therapy is recommended.  Rationale/Recommendations:  recommend OP PT for baseline balance deficits.

## 2024-03-20 NOTE — PLAN OF CARE
Pt here with R CVA. A&O x4. Neuros L ear numbness. VSS, SBP<160. Tele NSR. Regular diet, thin liquids. Takes pills whole. Up independently. Denies pain. Pt scoring green on the Aggression Stop Light Tool. Echo completed this shift. Education completed at bedside. Discharge home via spouse.

## 2024-03-20 NOTE — PROGRESS NOTES
Care Management Discharge Note    Discharge Date: 03/20/2024       Discharge Disposition: Home, Outpatient Rehab (PT, OT, SLP, Cardiac or Pulmonary)  Discharge Transportation:  family/friend    Private pay costs discussed: Not applicable    Handoff Referral Completed: Yes    Additional Information:  Patient is set to discharge today back home with outpatient therapies. Appointments scheduled by [facility], see previous note.     GABNIO Mcdonald, LADY   Social Work   [facility]

## 2024-03-20 NOTE — CONSULTS
Care Management Initial Consult    General Information  Assessment completed with: FamilyLissy, wife  Type of CM/SW Visit: Initial Assessment    Primary Care Provider verified and updated as needed: Yes   Readmission within the last 30 days: no previous admission in last 30 days      Reason for Consult: discharge planning  Advance Care Planning: Advance Care Planning Reviewed: no concerns identified          Communication Assessment  Patient's communication style: spoken language (English or Bilingual)    Hearing Difficulty or Deaf: yes   Wear Glasses or Blind: yes    Cognitive  Cognitive/Neuro/Behavioral: WDL  Level of Consciousness: alert  Arousal Level: opens eyes spontaneously  Orientation: oriented x 4  Mood/Behavior: calm, cooperative  Best Language: 0 - No aphasia  Speech: clear, spontaneous, logical    Living Environment:   People in home: spouse     Current living Arrangements: house      Able to return to prior arrangements: yes       Family/Social Support:  Care provided by: self  Provides care for:    Marital Status:   Wife          Description of Support System: Supportive         Current Resources:   Patient receiving home care services: No     Community Resources:    Equipment currently used at home: grab bar, tub/shower, shower chair  Supplies currently used at home:      Employment/Financial:  Employment Status: disabled        Financial Concerns: none           Does the patient's insurance plan have a 3 day qualifying hospital stay waiver?  No    Lifestyle & Psychosocial Needs:  Social Determinants of Health     Food Insecurity: No Food Insecurity (9/15/2023)    Hunger Vital Sign     Worried About Running Out of Food in the Last Year: Never true     Ran Out of Food in the Last Year: Never true   Depression: Not at risk (9/15/2023)    PHQ-2     PHQ-2 Score: 0   Housing Stability: Unknown (9/15/2023)    Housing Stability Vital Sign     Unable to Pay for Housing in the Last Year: No      Number of Places Lived in the Last Year: 1     Unstable Housing in the Last Year: Not on file   Tobacco Use: Low Risk  (11/16/2023)    Patient History     Smoking Tobacco Use: Never     Smokeless Tobacco Use: Never     Passive Exposure: Not on file   Financial Resource Strain: Medium Risk (9/15/2023)    Overall Financial Resource Strain (CARDIA)     Difficulty of Paying Living Expenses: Somewhat hard   Alcohol Use: Not At Risk (9/15/2023)    AUDIT-C     Frequency of Alcohol Consumption: Never     Average Number of Drinks: Patient does not drink     Frequency of Binge Drinking: Never   Transportation Needs: No Transportation Needs (9/15/2023)    PRAPARE - Transportation     Lack of Transportation (Medical): No     Lack of Transportation (Non-Medical): No   Physical Activity: Sufficiently Active (9/15/2023)    Exercise Vital Sign     Days of Exercise per Week: 5 days     Minutes of Exercise per Session: 30 min   Interpersonal Safety: Not At Risk (10/29/2019)    Humiliation, Afraid, Rape, and Kick questionnaire     Fear of Current or Ex-Partner: No     Emotionally Abused: No     Physically Abused: No     Sexually Abused: No   Stress: No Stress Concern Present (9/15/2023)    [employer] - Occupational Stress Questionnaire     Feeling of Stress : Not at all   Social Connections: Moderately Isolated (9/15/2023)    Social Connection and Isolation Panel [NHANES]     Frequency of Communication with Friends and Family: More than three times a week     Frequency of Social Gatherings with Friends and Family: Never     Attends Caodaism Services: Never     Active Member of Clubs or Organizations: No     Attends Club or Organization Meetings: Not on file     Marital Status:        Functional Status:  Prior to admission patient needed assistance:   Dependent ADLs:: Independent  Dependent IADLs:: Money Management       Mental Health Status:          Chemical Dependency Status:                 Values/Beliefs:  Spiritual, Cultural Beliefs, Shinto Practices, Values that affect care:                 Additional Information:  CM consult for discharge planning.  Per chart review, patient was admitted for acute ischemic stroke of the right lateral cerebellum and bilateral cortical parietal lobes due to an embolic sources, likely  cardioembolism in the setting up subtherapeutic INR.  Attempted to speak with patient but he was soundly sleeping.  Anticipate he may discharge to home today if medically stable.  PT and OT are recommending discharge to home with OPOT.  Contacted his wife Lissy.  Updated her on the recommendation of discharge to home with OP therapies.  He also will need appointment for INR on Monday 3/25 and a hospital follow up with PCP.  She would like those appointments scheduled.  Unable to get both appointments for same day.    Mar 25, 2024 11:30 AM  INR LAB with [facility]  [facility] [place] [facility] ([facility] )   Mar 27, 2024  8:30 AM  (Arrive by 8:15 AM)  ED/Hospital Follow Up with Patti Garcia PA-C  [facility] [place] ([facility] )     Nolvia Perez RN, BSN, PHN  Inpatient Care Coordination  [facility]  Phone: [phone]

## 2024-03-20 NOTE — PROGRESS NOTES
called and left a voicemail reporting that pt was hospitalized at [facility] for stroke with an INR of 2.5. Pt is being discharged today 3/20/24 with a new INR goal range of (2.8-3.5)    May call  at [phone] if further questions

## 2024-03-20 NOTE — PLAN OF CARE
PMH: Multiple conditions. Aortic dissection, mechanical aortic valve repair, paroxysmal a-fib on warfarin, chronic pain, HTN, HLD.    Admit: 3/18 with left-sided facial numbness, MRI showed small recent ischemic infarcts of right cerebellum & bilateral parietal lobes. CTH showed chronic infarcts.    Pain/comfort: Denies pain    Assessment: Alert & oriented x4. Vital signs stable on room air. Neuro status assessed & monitored, WDL except: left-sided facial numbness from ear to chin, and generalized weakness. Flowsheet data shows previously charted ataxia in 1 limb, not observed by writing RN. Baseline neuropathy in BLE. Baseline abnormal right eye visual tracking, no lateral (outward) movement in right eye. Denies vision change. Telemetry shows sinus rhythm with inverted T waves. Heart murmur detected. Anterior lung sounds clear. Continent of bowel & bladder. Discharge pending.    SBA. Reg/thin diet. R PIV -SL. SBP <160.    Ongoing cardiac monitoring. Vitals/neuro per unit protocol. Medications administered as ordered. Education regarding plan of care.    Renetta Gan RN on 3/19/2024 at 8:02 PM

## 2024-03-21 ENCOUNTER — PATIENT OUTREACH (OUTPATIENT)
Dept: CARE COORDINATION | Facility: CLINIC | Age: 69
End: 2024-03-21
Payer: COMMERCIAL

## 2024-03-21 ENCOUNTER — TELEPHONE (OUTPATIENT)
Dept: ANTICOAGULATION | Facility: CLINIC | Age: 69
End: 2024-03-21
Payer: COMMERCIAL

## 2024-03-21 DIAGNOSIS — Z95.2 AORTIC VALVE PROSTHESIS PRESENT: ICD-10-CM

## 2024-03-21 DIAGNOSIS — I48.0 PAF (PAROXYSMAL ATRIAL FIBRILLATION) (H): Primary | ICD-10-CM

## 2024-03-21 NOTE — TELEPHONE ENCOUNTER
ANTICOAGULATION  MANAGEMENT: Discharge Review    Todd S Aschoff chart reviewed for anticoagulation continuity of care    Hospital Admission on 3/18/24 for CVA.    Discharge disposition: Home    Results:    Recent labs: (last 7 days)     03/18/24  1243 03/18/24  2146 03/19/24  0655 03/20/24  0721   INR 2.22* 2.30* 2.41* 2.63*     Anticoagulation inpatient management:     more warfarin administered than maintenance regimen     Anticoagulation discharge instructions:     Warfarin dosing: increase dose to 7.5 mg Tu, Th, Sat and 5 mg all other days (6% increase in maintenance dose)   Bridging: No   INR goal change: Yes: new goal range 2.8-3.5.  New referral has been signed by PCP.      Medication changes affecting anticoagulation: No    Additional factors affecting anticoagulation: No     PLAN     Agree with dosing adjustment on discharge    Left a detailed message for Nicko advising him that warfarin dose was changed at discharge and to contact INR clinic if he had questions regarding the new dosage.  Recommended follow up is scheduled    Anticoagulation Calendar updated    Amnada Wheeler RN

## 2024-03-21 NOTE — LETTER
[facility] CARE COORDINATION  [address] [place] [address]     March 22, 2024    Todd S Aschoff  [address]  [place] [address]      Dear Nicko,    I am a clinic care coordinator who works with Herve Morgan MD with the [facility]. I recently tried to call and was unable to reach you. Below is a description of clinic care coordination and how I can further assist you.       The clinic care coordination team is made up of a registered nurse, , financial resource worker and community health worker who understand the health care system. The goal of clinic care coordination is to help you manage your health and improve access to the health care system. Our team works alongside your provider to assist you in determining your health and social needs. We can help you obtain health care and community resources, providing you with necessary information and education. We can work with you through any barriers and develop a care plan that helps coordinate and strengthen the communication between you and your care team.  Our services are voluntary and are offered without charge to you personally.    Please feel free to contact me with any questions or concerns regarding care coordination and what we can offer.      We are focused on providing you with the highest-quality healthcare experience possible.    Sincerely,     Vanessa Davenport, RN Care Coordinator  [facility] - [place], [place], [place]  Email: [email or web address]  Phone: [phone]

## 2024-03-21 NOTE — PROGRESS NOTES
Clinic Care Coordination Contact  [facility]/Voicemail    Clinical Data: Care Coordinator Outreach    Outreach Documentation Number of Outreach Attempt   3/21/2024  10:33 AM 1       Left message on patient's voicemail with call back information and requested return call.    Plan: Care Coordinator will try to reach patient again in 1-2 business days.    Vanessa Davenport, RN Care Coordinator  [facility] [place]  Email: [email or web address]  Phone: [phone]

## 2024-03-22 NOTE — PROGRESS NOTES
Clinic Care Coordination Contact  [facility]/Voicemail    Clinical Data: Care Coordinator Outreach    Outreach Documentation Number of Outreach Attempt   3/21/2024  10:33 AM 1   3/22/2024   2:11 PM 2       Left message on patient's voicemail with call back information and requested return call.    Plan: Care Coordinator will send care coordination introduction letter with care coordinator contact information and explanation of care coordination services via [employer]. Care Coordinator will do no further outreaches at this time.    Vanessa Davenport, RN Care Coordinator  [facility] [place]  Email: [email or web address]  Phone: [phone]

## 2024-03-23 DIAGNOSIS — M62.830 BACK MUSCLE SPASM: ICD-10-CM

## 2024-03-23 NOTE — PLAN OF CARE
Occupational Therapy Discharge Summary    Reason for therapy discharge:    Discharged to home with outpatient therapy.    Progress towards therapy goal(s). See goals on Care Plan in [facility] electronic health record for goal details.  Goals partially met.  Barriers to achieving goals:   discharge from facility.    Therapy recommendation(s):    Continued therapy is recommended.  Rationale/Recommendations:  for cognitive eval.

## 2024-03-25 ENCOUNTER — ANTICOAGULATION THERAPY VISIT (OUTPATIENT)
Dept: ANTICOAGULATION | Facility: CLINIC | Age: 69
End: 2024-03-25

## 2024-03-25 ENCOUNTER — LAB (OUTPATIENT)
Dept: LAB | Facility: CLINIC | Age: 69
End: 2024-03-25
Payer: COMMERCIAL

## 2024-03-25 DIAGNOSIS — R97.20 ELEVATED PROSTATE SPECIFIC ANTIGEN (PSA): ICD-10-CM

## 2024-03-25 DIAGNOSIS — R68.82 LOW LIBIDO: ICD-10-CM

## 2024-03-25 DIAGNOSIS — Z95.2 AORTIC VALVE PROSTHESIS PRESENT: ICD-10-CM

## 2024-03-25 DIAGNOSIS — I48.0 PAF (PAROXYSMAL ATRIAL FIBRILLATION) (H): Primary | ICD-10-CM

## 2024-03-25 DIAGNOSIS — N52.9 ERECTILE DYSFUNCTION, UNSPECIFIED ERECTILE DYSFUNCTION TYPE: ICD-10-CM

## 2024-03-25 DIAGNOSIS — I48.0 PAF (PAROXYSMAL ATRIAL FIBRILLATION) (H): ICD-10-CM

## 2024-03-25 LAB — INR BLD: 3 (ref 0.9–1.1)

## 2024-03-25 PROCEDURE — 36415 COLL VENOUS BLD VENIPUNCTURE: CPT

## 2024-03-25 PROCEDURE — 85610 PROTHROMBIN TIME: CPT

## 2024-03-25 RX ORDER — DIAZEPAM 5 MG
5 TABLET ORAL
Qty: 30 TABLET | Refills: 0 | Status: SHIPPED | OUTPATIENT
Start: 2024-03-25 | End: 2024-04-25

## 2024-03-25 NOTE — PROGRESS NOTES
ANTICOAGULATION MANAGEMENT     Todd S Aschoff 68 year old male is on warfarin with therapeutic INR result. (Goal INR Other - see comment)    Recent labs: (last 7 days)     03/25/24  1113   INR 3.0*       ASSESSMENT     Source(s): Chart Review and Patient/Caregiver Call     Warfarin doses taken: More warfarin taken than planned which may be affecting INR - Nicko reports he was instructed to take 10 mg Tues/Thurs/Sun; 7.5 mg all other days (shown as 41.2% dosing increase from previous maintenance dose). Per discharge notes, was instructed to take 7.5 mg Tues/Thurs/Sat; 5 mg all other days.   Diet: No new diet changes identified  Medication/supplement changes: None noted  New illness, injury, or hospitalization: Yes: Hospital admission 3/18-3/30/24 for CVA. Goal range changed to 2.8-3.5.   Signs or symptoms of bleeding or clotting: No  Previous result: Subtherapeutic (per new goal range)  Additional findings: None       PLAN     Recommended plan for temporary change(s) affecting INR     Dosing Instructions: Continue your current warfarin dose (as you've been taking, shown as 41.2% increase) with next INR in 4 days       Summary  As of 3/25/2024      Full warfarin instructions:  10 mg every Sun, Tue, Thu; 7.5 mg all other days   Next INR check:  3/29/2024               Telephone call with Nicko who verbalizes understanding and agrees to plan    Lab visit scheduled    Education provided:   Please call back if any changes to your diet, medications or how you've been taking warfarin  Taking warfarin: Importance of taking warfarin as instructed  New goal range    Plan made with [facility] Pharmacist Nolvia Andrew RN  Anticoagulation Clinic  3/25/2024    _______________________________________________________________________     Anticoagulation Episode Summary       Current INR goal:  Other - see comment   TTR:  57.7% (11.9 mo)   Target end date:  Indefinite   Send INR reminders to:  AIDA [place]     Indications    PAF (paroxysmal atrial fibrillation) (H) [I48.0]  Mechanical AV Replacement 1992 -- on Warfarin  [Z95.2]             Comments:  3/20/24-goal 2.8-3.5             Anticoagulation Care Providers       Provider Role Specialty Phone number    Obdulio Ingram MD Referring Internal Medicine [phone]    Herve Morgan MD Referring Internal Medicine - Pediatrics [phone]

## 2024-03-27 ENCOUNTER — THERAPY VISIT (OUTPATIENT)
Dept: PHYSICAL THERAPY | Facility: CLINIC | Age: 69
End: 2024-03-27
Attending: INTERNAL MEDICINE
Payer: COMMERCIAL

## 2024-03-27 DIAGNOSIS — I63.9 CEREBROVASCULAR ACCIDENT (CVA), UNSPECIFIED MECHANISM (H): ICD-10-CM

## 2024-03-27 DIAGNOSIS — R26.89 IMPAIRMENT OF BALANCE: Primary | ICD-10-CM

## 2024-03-27 PROCEDURE — 97162 PT EVAL MOD COMPLEX 30 MIN: CPT | Mod: GP | Performed by: PHYSICAL THERAPIST

## 2024-03-27 PROCEDURE — 97112 NEUROMUSCULAR REEDUCATION: CPT | Mod: GP | Performed by: PHYSICAL THERAPIST

## 2024-03-27 NOTE — PROGRESS NOTES
"PHYSICAL THERAPY EVALUATION  Type of Visit: Evaluation    See electronic medical record for Abuse and Falls Screening details.    Subjective       Presenting condition or subjective complaint:    Per chart review, recent hospital stay s/p \"Acute ischemic stroke of the right lateral cerebellum and bilateral cortical parietal lobes, likely cardioembolic.\"   Patient reporting continued numbness in left facial area.  Patient reporting loss of hearing with most recent CVAs.  Patient arriving today wanting to address strength.  Date of onset: 03/17/24    Relevant medical history:    Past Medical History:   Diagnosis Date    Advanced directives, counseling/discussion 1/6/2012    Discussed Advance Directive planning with patient; information given to patient to review.    Alcohol dependence (H)     Alcohol dependence in remission (H) 8/2/2018    Allergy, unspecified not elsewhere classified     seasonal    Anemia, unspecified type 1/22/2021    Aortic valve prosthesis present 2/8/2021    Atrial fibrillation (H)     Benign prostatic hyperplasia 2/8/2021    Bilateral thoracic back pain 11/16/2016    BPH (benign prostatic hypertrophy)     Chronic atrial fibrillation (H) 8/7/2002    Chronic infection     low back wound incision not healing     Chronic low back pain 8/19/2011    Chronic pain     lower back and right leg and left leg    Depressive disorder 6/9/2010    Depression  NOS    Dissection of aorta, thoracic (H) 1992    St Jose F aotic valve + arch graft 1992    Elevated prostate specific antigen (PSA) 1/22/2020    Family history of prostate cancer 1/22/2020    Generalized muscle weakness 1/22/2021    Headache(784.0)     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 Provider to " review    Leg wound, left 1/9/2021    Long term current use of anticoagulant therapy 8/7/2002    LW Modifier:  Coumadin, since mechanical aortic valve LW Onset:  1992 ; Anticoagulant Rx Long Term Problem list name updated by automated process. Provider to review    Low back pain     Lumbar radiculopathy 4/3/2013    Lumbar surgical wound fluid collection 5/23/2013    Major depression     Memory difficulties 1/16/2015    Mixed hyperlipidemia     Morbid obesity (H) 4/20/2010    LW Modifier:  BMI 41.3 (Apr 2010) ; Obesity Morbid    Numbness and tingling     right leg post surg rightt hip/ also left leg since surg    OA (osteoarthritis)     hips    OAB (overactive bladder) 5/11/2015    Obesity, unspecified     Persons encountering health services in other specified circumstances 4/3/2012    Formatting of this note might be different from the original. EMERGENCY CARE PLAN Presenting Problem Signs and Symptoms Treatment Plan   Questions or conerns during clinic hours    I will call the clinic directly    Questions or conerns outside clinic hours    I will call the 24 hour nurse line at [phone]   Patient needs to schedule an appointment    I will call the 24 hour scheduling team at    Polypharmacy 1/22/2021    Prostate infection     Radiculitis 4/20/2010    LW Modifier:  Right foot, s/p R AMANDA LW Onset:  2002 ; Neuralgia    Recurrent major depressive episodes, in full remission (H24) 10/30/2012    Renal insufficiency 1/22/2021    Rotator cuff tear 1/26/2015    Rotator cuff tear, right    S/P lumbar fusion 4/5/2018    S/P [employer] 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 Jose F's valve    APPLY WOUND VAC Left 1/26/2021    Procedure: Placement of negative pressure wound therapy 2,400 squared centimeters  ;  Surgeon: Lazaro Plascencia MD;  Location: [facility]    CATARACT IOL, RT/LT      rt eye only    CHOLECYSTECTOMY, LAPOROSCOPIC  8/10    CLOSE SECONDARY WOUND LOWER EXTREMITY Left 2/3/2021    Procedure:  Split Thickness Skin Graft to Leg of 18 square centimeters  Intermediate Closure of Leg of 8cm   ;  Surgeon: Lazaro Plascencia MD;  Location: [facility]    COLONOSCOPY N/A 1/15/2015    Procedure: COLONOSCOPY;  Surgeon: Johnson Kothari MD;  Location: [facility]    DECOMPRESSION LUMBAR ONE LEVEL  4/3/2013    Procedure: DECOMPRESSION LUMBAR ONE LEVEL;  Open Decompression L3-4 bilateral;  Surgeon: Travis Coffey MD;  Location: [facility]    DECOMPRESSION, FUSION CERVICAL ANTERIOR ONE LEVEL, COMBINED  3/23/2012    Procedure:COMBINED DECOMPRESSION, FUSION CERVICAL ANTERIOR ONE LEVEL; Anterior Cervical Decompression and Fusion C4-6; Surgeon:TRAVIS COFFEY; Location:[facility] OR    ELBOW SURGERY      EXPLORE SPINE, REMOVE HARDWARE, COMBINED  5/23/2013    Procedure: COMBINED EXPLORE SPINE, REMOVE HARDWARE;  Exploration Lumbar Wound for fluid collection;  Surgeon: Travis Coffey MD;  Location: [facility]    FUSION CERVICAL ANTERIOR TWO LEVELS  3/26/2012    Procedure:FUSION CERVICAL ANTERIOR TWO LEVELS; Anterior Cervical Fusion C4-6, Anterior Cervical  Hematoma Evacuation; Surgeon:TRAVIS COFFEY; Location:[facility]    IR LUMBAR EPIDURAL STEROID INJECTION  3/28/2003    IRRIGATION AND DEBRIDEMENT LOWER EXTREMITY, COMBINED Left 1/10/2021    Procedure: 1.  Irrigation and excisional debridement to muscle left distal medial calf chronic wounds total area measuring 9 cm x 4 cm 2.  Irrigation and excisional debridement to fascia left medial calf chronic hematoma measuring 29 x  6.7 x 4.2 cm 3.  Primary complex wound closure left medial distal calf 9 cm in length;  Surgeon: Rod Kemp MD;  Location: [facility]    IRRIGATION AND DEBRIDEMENT LOWER EXTREMITY, COMBINED Left 1/26/2021    Procedure: Evacuation of hematoma debridement of skin, subcutaneous tissue and muscle 2,400 squared centimeters, placement of negative pressure wound therapy 2,400 squared centimeters;  Surgeon: Lazaro Plascencia MD;  Location: [facility]    IRRIGATION AND DEBRIDEMENT SPINE, CLOSE WOUND, COMBINED  3/26/2012    Procedure:COMBINED IRRIGATION AND DEBRIDEMENT SPINE, CLOSE WOUND; Surgeon:TRAVIS COFFEY; Location:[facility]    OTHER SURGICAL HISTORY      [place] UNLISTED ORBIT    OTHER SURGICAL HISTORY      [place] UNLISTED SPINE    OTHER SURGICAL HISTORY      [place] UNLISTED NECK/THORAX    OTHER SURGICAL HISTORY      (IA) [place] TOTAL HIP ARTHROPLASTY    OTHER SURGICAL HISTORY      (IA) [place] NEE SCOPE MED W LAT MENISCECT WWO DEBRIBE/SHAVE ANY CO    removal of cyst of back   2.5week    TONSILLECTOMY      wisdom teeth[      [facility] NONSPECIFIC PROCEDURE  1992    repair of TAA with graft    [facility] TOTAL HIP ARTHROPLASTY  2010    Left AMANDA    [facility] TOTAL HIP ARTHROPLASTY  2002    R hip replacement comp's by nerve injury with pain down into R leg, nadya below knee     Prior diagnostic imaging/testing results:     See electronic medical record  Prior therapy history for the same diagnosis, illness or injury:    Therapy services during hospital stay.    Prior Level of Function  Transfers:  Modified independence  Ambulation: Independent; reports no use of an assistive device  ADL: Reports independence and not having to use equipment  IADL: Driving, Housekeeping, Laundry, Meal preparation, Medication management    Living Environment  Social support:  Lives with spouse  Type of home:  Split level house   Stairs to enter the home:       7 to enter from the front, no steps to enter from garage  Stairs inside the home:       7 steps with rail to access different  "levels of the home  Help at home:  wife recovering from fractured foot; patient primarily performing IADLs - wife manages finances.  Equipment owned:   has various equipment (canes, crutches, walker, etc), but does not use    Employment:    retired after back surgery in 2017  Hobbies/Interests:  none reported    Patient goals for therapy:  \"more strength\"    Pain assessment: Patient reporting chronic low back pain as well as left shoulder pain (reports he would like surgery, but has been told he needs to get his weight down by new surgeon).     Objective      Cognitive Status Examination  Orientation: Oriented to person, place and time   Level of Consciousness: Alert  Follows Commands and Answers Questions: 100% of the time  Personal Safety and Judgement: Intact  Memory:  Did not formally assess, no obvious deficits.  Patient does have order for Occupational Therapy - reports had an appointment on Monday, but cancelled due to he was cleared by hospital OT - per review of note, hospital OT noted some mild cognitive impairments.    OBSERVATION: Pleasant, no apparent distress, dry mouth - so speech a bit off, impaired right eye tracking at baseline - has learned to compensated and denies changes in vision  POSTURE: Forward head and rounded shoulders  RANGE OF MOTION: Bilateral upper and lower extremities WFL; bilateral lower extremities WFL  STRENGTH: At least 2+/5 bilateral upper and lower extremities; decreased right > left lower extremity strength (patient reporting history of hematoma right lower extremity).    BED MOBILITY: Reports independence    TRANSFERS: Modified independence with moderate use of upper extremities with increased weight shift onto right lower extremity    GAIT:   Ambulates independently demonstrating forward trunk flexion, lateral trunk lean towards right, decreased bilateral arm swing, impaired maynor, decreased gait speed, decreased heel strike, push off and clearance bilateral    BALANCE: " "Deficits with standing balance, static and dynamic, as observed with functional mobility, will continue to assess at future visits    SPECIAL TESTS   - 5 time STS: 22.54 seconds to complete from standard 17\" chair with bilateral UE use.    - >15 seconds indicative of falls risk    - Gait speed (preferred): 0.755 m/s (13.23 to walk 10 meters) with 20 steps. No AD used   - .8 m/s or faster for \"community Ambulator\"    - Gait Speed (fast): 0.989 m/s (10.11 to walk 10 meters) with 17 steps. No AD used.    - 1.0 m/s or faster equated with lower falls risk.     Assessment & Plan   CLINICAL IMPRESSIONS  Medical Diagnosis:      Treatment Diagnosis: Impaired functional mobility and balance; Decreased Strength   Impression/Assessment: Patient is a 68 year old male with above subjective complaints.  Patient with complex medical history that may impact tolerance and ability to participate in physical therapy, however, patient very motivated to maximize return to his prior level of function.  The following significant findings have been identified: Pain, Decreased ROM/flexibility, Decreased strength, Impaired balance, Impaired gait, Impaired muscle performance, Decreased activity tolerance, Impaired posture, and Instability. These impairments interfere with their ability to perform self care tasks, recreational activities, household chores, household mobility, and community mobility as compared to previous level of function.     Clinical Decision Making (Complexity):  Clinical Presentation: Evolving/Changing  Clinical Presentation Rationale: based on medical and personal factors listed in PT evaluation  Clinical Decision Making (Complexity): Moderate complexity    PLAN OF CARE  Treatment Interventions:  Interventions: Gait Training, Manual Therapy, Neuromuscular Re-education, Therapeutic Activity, Therapeutic Exercise, Self-Care/Home Management, Standardized Testing    Long Term Goals     PT Goal 1  Goal Identifier: HEP  Goal " Description: Pt will demonstrate IND with strength, balance, ROM, stretching, and muscular and aerobic endurance HEP, while working to improve capacity for functional mobility and minimize risk for falls.  Goal Progress: continue with HEP provided by hospital therapist  Target Date: 06/24/24  PT Goal 2  Goal Identifier: 5xSTS  Goal Description: Pt will complete x5 STS in <15s, with min use of B UEs, to demonstrate improvement in B LE strength and a reduced risk for falling.  Goal Progress:  (Eval: 22.54 seconds with moderate use of bilateral upper extremities)  Target Date: 06/24/24  PT Goal 3  Goal Identifier: MOODY  Goal Description: Patient will demonstrate MDC from baseline Moody Balance score indicating improved balance and decreased risk for falls.  Goal Progress: To assess at future visit  Target Date: 06/24/24  PT Goal 4  Goal Identifier: 10 MWT  Goal Description: Patient will achieve MDC for self-selected and fast gait speed to show improved gait efficiency, maynor, and mechanics to reduce risk for falling.  Goal Progress:  (Eval: self-selected 0.755 m/s and fast pace 0.989)  Target Date: 06/24/24  PT Goal 5  Goal Identifier: Stairs  Goal Description: Patient will be able to ascend/descend a flight of stairs with modified independence using a reciprocal pattern demonstrating improved safety and ease of performing task.  Goal Progress:  (Eval: reports side steps up/down with use of rail)  Target Date: 06/24/24      Frequency of Treatment: 2x/week with reduction of frequency as appropriate  Duration of Treatment: 90 days    Recommended Referrals to Other Professionals: Occupational Therapy  Education Assessment:   Learner/Method: Patient;Listening;Demonstration;Reading;No Barriers to Learning  Education Comments: Discussed recommendation to re-schedule Occupational Therapy Evaluation    Risks and benefits of evaluation/treatment have been explained.   Patient/Family/caregiver agrees with Plan of Care.      Evaluation Time:     PT Eval, Moderate Complexity Minutes (89826): 38   Signing Clinician: Teresita Boucher PT, DPT      [facility]                                                                                   OUTPATIENT PHYSICAL THERAPY      PLAN OF TREATMENT FOR OUTPATIENT REHABILITATION   Patient's Last Name, First Name, M.I. Aschoff,Todd S YOB: 1955   Provider's Name   [facility]   Medical Record No.  [id number]     Onset Date: 03/17/24  Start of Care Date: 03/27/24     Medical Diagnosis:     Cerebrovascular accident (CVA), unspecified mechanism     PT Treatment Diagnosis:  Impaired functional mobility and balance; Decreased Strength Plan of Treatment  Frequency/Duration: 2x/week with reduction of frequency as appropriate/ 90 days    Certification date from 03/27/24 to 06/24/24         See note for plan of treatment details and functional goals     Teresita Boucher PT, DPT                         I CERTIFY THE NEED FOR THESE SERVICES FURNISHED UNDER        THIS PLAN OF TREATMENT AND WHILE UNDER MY CARE     (Physician attestation of this document indicates review and certification of the therapy plan).              Referring Provider:  Cindy Reyes MD; sending to PCP (Dr. Herve Morgan MD) to complete cert as referring provider hospital based    Initial Assessment  See Epic Evaluation- Start of Care Date: 03/27/24

## 2024-03-29 ENCOUNTER — LAB (OUTPATIENT)
Dept: LAB | Facility: CLINIC | Age: 69
End: 2024-03-29
Payer: COMMERCIAL

## 2024-03-29 ENCOUNTER — ANTICOAGULATION THERAPY VISIT (OUTPATIENT)
Dept: ANTICOAGULATION | Facility: CLINIC | Age: 69
End: 2024-03-29

## 2024-03-29 DIAGNOSIS — I48.0 PAF (PAROXYSMAL ATRIAL FIBRILLATION) (H): Primary | ICD-10-CM

## 2024-03-29 DIAGNOSIS — I48.0 PAF (PAROXYSMAL ATRIAL FIBRILLATION) (H): ICD-10-CM

## 2024-03-29 DIAGNOSIS — Z95.2 AORTIC VALVE PROSTHESIS PRESENT: ICD-10-CM

## 2024-03-29 LAB — INR BLD: 3 (ref 0.9–1.1)

## 2024-03-29 PROCEDURE — 85610 PROTHROMBIN TIME: CPT

## 2024-03-29 PROCEDURE — 36416 COLLJ CAPILLARY BLOOD SPEC: CPT

## 2024-03-29 NOTE — PROGRESS NOTES
ANTICOAGULATION MANAGEMENT     Todd S Aschoff 68 year old male is on warfarin with therapeutic INR result. (Goal INR Other - see comment)    Recent labs: (last 7 days)     03/29/24  1126   INR 3.0*       ASSESSMENT     Source(s): Chart Review and Patient/Caregiver Call     Warfarin doses taken: Less warfarin taken than planned which may be affecting INR - patient reports being confused during 3/25/24 [facility] visit and accidentally reported much higher MD than actually taken. ACRN updated 3/25/24 encounter to reflect true dose administered.   Diet: No new diet changes identified  Medication/supplement changes: None noted  New illness, injury, or hospitalization: Yes: recent hospitalization for CVA from 3/18/24-3/20/24 - INR goal range increased to 2.8-3.5  Signs or symptoms of bleeding or clotting: No  Previous result: Therapeutic last visit; previously outside of goal range  Additional findings: Per [facility] tracker, today's dosing plan appears to be a 29.2% reduction, but this is not accurate given patient's reporting error       PLAN     Recommended plan for ongoing change(s) affecting INR     Dosing Instructions: Continue your current warfarin dose with next INR in 4 days (d/t dose discrepancy)      Summary  As of 3/29/2024      Full warfarin instructions:  7.5 mg every Sun, Tue, Thu; 5 mg all other days   Next INR check:  4/2/2024               Telephone call with Nicko who verbalizes understanding and agrees to plan    Lab visit scheduled - already scheduled for 4/2/24    Education provided:   Please call back if any changes to your diet, medications or how you've been taking warfarin  Symptom monitoring: monitoring for bleeding signs and symptoms and monitoring for clotting signs and symptoms    Plan made with [facility] Pharmacist Luz Maria Radford, RN  Anticoagulation Clinic  3/29/2024    _______________________________________________________________________     Anticoagulation Episode Summary       Current  INR goal:  Other - see comment   TTR:  58.8% (11.9 mo)   Target end date:  Indefinite   Send INR reminders to:  [facility]    Indications    PAF (paroxysmal atrial fibrillation) (H) [I48.0]  Mechanical AV Replacement 1992 -- on Warfarin  [Z95.2]             Comments:  3/20/24-goal 2.8-3.5             Anticoagulation Care Providers       Provider Role Specialty Phone number    Obdulio Ingram MD Referring Internal Medicine [phone]    Herve Morgan MD Referring Internal Medicine - Pediatrics [phone]

## 2024-03-29 NOTE — PROGRESS NOTES
3/29/24 update: patient was confused on Monday and reported incorrect MD to [facility] team. Writer updated [facility] tracker with accurate dosing. Sienna Radford, KALIN, RN

## 2024-04-02 ENCOUNTER — ANTICOAGULATION THERAPY VISIT (OUTPATIENT)
Dept: ANTICOAGULATION | Facility: CLINIC | Age: 69
End: 2024-04-02

## 2024-04-02 ENCOUNTER — LAB (OUTPATIENT)
Dept: LAB | Facility: CLINIC | Age: 69
End: 2024-04-02
Payer: COMMERCIAL

## 2024-04-02 DIAGNOSIS — Z95.2 AORTIC VALVE PROSTHESIS PRESENT: ICD-10-CM

## 2024-04-02 DIAGNOSIS — I48.0 PAF (PAROXYSMAL ATRIAL FIBRILLATION) (H): Primary | ICD-10-CM

## 2024-04-02 DIAGNOSIS — I48.0 PAF (PAROXYSMAL ATRIAL FIBRILLATION) (H): ICD-10-CM

## 2024-04-02 LAB
INR BLD: 3.2 (ref 0.9–1.1)
SHBG SERPL-SCNC: 48 NMOL/L (ref 11–80)

## 2024-04-02 PROCEDURE — 84270 ASSAY OF SEX HORMONE GLOBUL: CPT

## 2024-04-02 PROCEDURE — 85610 PROTHROMBIN TIME: CPT

## 2024-04-02 PROCEDURE — 84403 ASSAY OF TOTAL TESTOSTERONE: CPT

## 2024-04-02 PROCEDURE — 36415 COLL VENOUS BLD VENIPUNCTURE: CPT

## 2024-04-02 NOTE — PROGRESS NOTES
ANTICOAGULATION MANAGEMENT     Todd S Aschoff 68 year old male is on warfarin with therapeutic INR result. (Goal INR Other - see comment)    Recent labs: (last 7 days)     04/02/24  0757   INR 3.2*       ASSESSMENT     Source(s): Chart Review  Previous INR was Therapeutic last 2(+) visits  Medication, diet, health changes since last INR chart reviewed; none identified  Confusion noted about warfarin dose reported at last visit.  Would prefer to talk to patient directly about results due to this to ensure correct warfarin dosing.         PLAN     Unable to reach Nicko today.    Left message for patient to call INR clinic to discuss result.    Follow up required to confirm warfarin dose taken and assess for changes    Amanda Wheeler, RN  Anticoagulation Clinic  4/2/2024

## 2024-04-02 NOTE — PROGRESS NOTES
ANTICOAGULATION MANAGEMENT     Todd S Aschoff 68 year old male is on warfarin with therapeutic INR result. (Goal INR 2.8-3.5)    Recent labs: (last 7 days)     04/02/24  0757   INR 3.2*       ASSESSMENT     Source(s): Patient/Caregiver Call     Warfarin doses taken: Warfarin taken as instructed  Diet: No new diet changes identified  Medication/supplement changes: None noted  New illness, injury, or hospitalization: No  Signs or symptoms of bleeding or clotting: No  Previous result: Therapeutic last 2(+) visits  Additional findings: None       PLAN     Recommended plan for no diet, medication or health factor changes affecting INR     Dosing Instructions: Continue your current warfarin dose with next INR in 10 days       Summary  As of 4/2/2024      Full warfarin instructions:  7.5 mg every Sun, Tue, Thu; 5 mg all other days   Next INR check:  4/12/2024               Telephone call with Nicko who agrees to plan and repeated back plan correctly    Lab visit scheduled    Education provided:   Please call back if any changes to your diet, medications or how you've been taking warfarin    Plan made per [facility] anticoagulation protocol    Amanda Wheeler RN  Anticoagulation Clinic  4/2/2024    _______________________________________________________________________     Anticoagulation Episode Summary       Current INR goal:  Other - see comment   TTR:  58.8% (11.9 mo)   Target end date:  Indefinite   Send INR reminders to:  [facility]    Indications    PAF (paroxysmal atrial fibrillation) (H) [I48.0]  Mechanical AV Replacement 1992 -- on Warfarin  [Z95.2]             Comments:  3/20/24-goal 2.8-3.5             Anticoagulation Care Providers       Provider Role Specialty Phone number    Obdulio Ingram MD Referring Internal Medicine [phone]    Herve Morgan MD Referring Internal Medicine - Pediatrics [phone]

## 2024-04-04 ENCOUNTER — OFFICE VISIT (OUTPATIENT)
Dept: PEDIATRICS | Facility: CLINIC | Age: 69
End: 2024-04-04
Payer: COMMERCIAL

## 2024-04-04 VITALS
TEMPERATURE: 97.4 F | BODY MASS INDEX: 43.68 KG/M2 | HEART RATE: 68 BPM | RESPIRATION RATE: 20 BRPM | OXYGEN SATURATION: 98 % | DIASTOLIC BLOOD PRESSURE: 80 MMHG | WEIGHT: 312 LBS | HEIGHT: 71 IN | SYSTOLIC BLOOD PRESSURE: 110 MMHG

## 2024-04-04 DIAGNOSIS — Z95.2 AORTIC VALVE PROSTHESIS PRESENT: ICD-10-CM

## 2024-04-04 DIAGNOSIS — I48.0 PAF (PAROXYSMAL ATRIAL FIBRILLATION) (H): ICD-10-CM

## 2024-04-04 DIAGNOSIS — I10 BENIGN ESSENTIAL HYPERTENSION: ICD-10-CM

## 2024-04-04 DIAGNOSIS — Z09 HOSPITAL DISCHARGE FOLLOW-UP: Primary | ICD-10-CM

## 2024-04-04 DIAGNOSIS — Z86.73 HISTORY OF RECENT STROKE: ICD-10-CM

## 2024-04-04 LAB
TESTOST FREE SERPL-MCNC: 5.04 NG/DL
TESTOST SERPL-MCNC: 324 NG/DL (ref 240–950)

## 2024-04-04 PROCEDURE — 99214 OFFICE O/P EST MOD 30 MIN: CPT | Performed by: PHYSICIAN ASSISTANT

## 2024-04-04 ASSESSMENT — PATIENT HEALTH QUESTIONNAIRE - PHQ9
SUM OF ALL RESPONSES TO PHQ QUESTIONS 1-9: 2
SUM OF ALL RESPONSES TO PHQ QUESTIONS 1-9: 2
10. IF YOU CHECKED OFF ANY PROBLEMS, HOW DIFFICULT HAVE THESE PROBLEMS MADE IT FOR YOU TO DO YOUR WORK, TAKE CARE OF THINGS AT HOME, OR GET ALONG WITH OTHER PEOPLE: NOT DIFFICULT AT ALL

## 2024-04-04 ASSESSMENT — PAIN SCALES - GENERAL: PAINLEVEL: NO PAIN (0)

## 2024-04-04 NOTE — PATIENT INSTRUCTIONS
Today was a visit for your recent hospital stay for a stroke.      In review of your chart, it appears you are scheduled to see neurology on 04/09/2024, please be sure to keep that appointment.   It is at [facility] in [place].      It looks as though you are due to see your primary for a routine check, please work with the nurses to get this appointment scheduled.     Please be sure to attend the rehab sessions as scheduled.     Please continue to see the INR clinic as directed to keep your INR at your new specific goal.      Please followup if symptoms change or worsen in any way.

## 2024-04-04 NOTE — PROGRESS NOTES
Assessment & Plan     Hospital discharge follow-up  - Overall patient reports that he is doing well and does not have symptoms or concerns today.  When I first met him for the visit, he thought he was here to go over Urology labs from Dr. Andersen's office, specifically the testosterone.  This is not resulted yet in his chart.  He will contact Dr. Andersen's office for this result.    - Review of the hospital notes shows that the patient has a new INR goal of between 2.8-3.5.  He has been following up at the INR clinic as scheduled and advised.  INR appears to be within goal at recent appointments.    - Upcoming appointments also advised, patient informed of them.  He did not know about the Neurology appointment that is scheduled for 04/09/2024, he will plan to attend that appointment.   - Will be attending therapy/rehab as advised.  Session scheduled for tomorrow.  Patient informed and understands.      History of recent stroke  - Patient reports improvement and denies concerns or symptoms.      Benign essential hypertension  - BP is well controlled today    PAF (paroxysmal atrial fibrillation) (H)    Mechanical AV Replacement 1992 -- on Warfarin   - Murmur/click appreciated on exam      Patient plan:  Today was a visit for your recent hospital stay for a stroke.      In review of your chart, it appears you are scheduled to see neurology on 04/09/2024, please be sure to keep that appointment.   It is at [facility] in [place].      It looks as though you are due to see your primary for a routine check, please work with the nurses to get this appointment scheduled.     Please be sure to attend the rehab sessions as scheduled.     Please continue to see the INR clinic as directed to keep your INR at your new specific goal.      Please followup if symptoms change or worsen in any way.        Patient understands and agrees with the plan today.      MED REC REQUIRED  Post Medication Reconciliation  "Status:   BMI  Estimated body mass index is 43.52 kg/m  as calculated from the following:    Height as of this encounter: 1.803 m (5' 11\").    Weight as of this encounter: 141.5 kg (312 lb).       Obinna Maharaj is a 68 year old, presenting for the following health issues:  [facility] F/U        4/4/2024     7:22 AM   Additional Questions   Roomed by Rita         4/4/2024     7:22 AM   Patient Reported Additional Medications   Patient reports taking the following new medications None     HPI         3/21/2024    10:31 AM   Post Discharge Outreach   Admission Date 3/18/2024   Reason for Admission Acute ischemic stroke of the right lateral cerebellum and bilateral cortical parietal lobes, likely cardioembolic   Paroxysmal atrial fibrillation  Hypothyroid  Mechanical AV replacement 1992, on warfarin  Mild cognitive impairment  Depression, anxiety  Chronic pain  History of CVA x 4   Discharge Date 3/20/2024   Discharge Diagnosis Acute ischemic stroke of the right lateral cerebellum and bilateral cortical parietal lobes, likely cardioembolic   Paroxysmal atrial fibrillation  Hypothyroid  Mechanical AV replacement 1992, on warfarin  Mild cognitive impairment  Depression, anxiety  Chronic pain  History of CVA x 4   Were you started on any new medications or were there changes to any of your previous medications?  Yes   Discharge follow-up appointment scheduled within 14 calendar days?  Yes   Discharge Follow Up Appointment Date 3/27/2024   Discharge Follow Up Appointment Scheduled with? Primary Care Provider     Hospital Follow-up Visit:    Hospital/Nursing Home/ Rehab Facility: [facility]  Date of Admission: 3/18/2024  Date of Discharge: 3/20/2024  Reason(s) for Admission: Stroke    Was your hospitalization related to COVID-19? No   Problems taking medications regularly:  None  Medication changes since discharge: None  Problems adhering to non-medication therapy:  None    Summary of " "hospitalization:  [facility] discharge summary reviewed  Diagnostic Tests/Treatments reviewed.  Follow up needed: None  Other Healthcare Providers Involved in Patient s Care:         Specialist appointment - 04/09/2024  Update since discharge: improved.         Plan of care communicated with patient           He is here for a hospital followup, though he thought he was coming in to review labs from Dr. Fay's Urology office.     He is doing fine post stroke, he reports that he \"has not had another one,\" when I asked him how he has been doing.  He was joking and laughing, but does report that he has had 8 strokes in his past.      He is enrolled in therapy/rehab, but was unable to go last week as his wife was in the hospital.  He does have an appointment for this tomorrow, which he plans to make.      He does not have concerns today.          Review of Systems  Constitutional, neuro, ENT, endocrine, pulmonary, cardiac, gastrointestinal, genitourinary, musculoskeletal, integument and psychiatric systems are negative, except as otherwise noted.      Objective    /80   Pulse 68   Temp 97.4  F (36.3  C)   Resp 20   Ht 1.803 m (5' 11\")   Wt 141.5 kg (312 lb)   SpO2 98%   BMI 43.52 kg/m    Body mass index is 43.52 kg/m .  Physical Exam   GENERAL: alert and no distress  EYES: Eyes grossly normal to inspection, PERRL and conjunctivae and sclerae normal  NECK: no adenopathy, no asymmetry, masses, or scars  RESP: lungs clear to auscultation - no rales, rhonchi or wheezes  CV: regular rates and rhythm, Clicking murmur present, peripheral pulses strong, and no peripheral edema  MS: no gross musculoskeletal defects noted, no edema  NEURO: Normal strength and tone, mentation intact and speech normal  NEURO: Normal strength and tone, sensory exam grossly normal, mentation intact, speech normal, and cranial nerves 2-12 intact          Signed Electronically by: Patti Garcia PA-C    "

## 2024-04-05 ENCOUNTER — THERAPY VISIT (OUTPATIENT)
Dept: PHYSICAL THERAPY | Facility: CLINIC | Age: 69
End: 2024-04-05
Payer: COMMERCIAL

## 2024-04-05 DIAGNOSIS — M62.81 GENERALIZED MUSCLE WEAKNESS: Primary | ICD-10-CM

## 2024-04-05 DIAGNOSIS — I63.9 CEREBROVASCULAR ACCIDENT (CVA), UNSPECIFIED MECHANISM (H): ICD-10-CM

## 2024-04-05 DIAGNOSIS — R68.89 DECREASED ACTIVITY TOLERANCE: ICD-10-CM

## 2024-04-05 DIAGNOSIS — R26.89 IMPAIRMENT OF BALANCE: ICD-10-CM

## 2024-04-05 PROCEDURE — 97110 THERAPEUTIC EXERCISES: CPT | Mod: GP | Performed by: PHYSICAL THERAPIST

## 2024-04-05 PROCEDURE — 97112 NEUROMUSCULAR REEDUCATION: CPT | Mod: GP | Performed by: PHYSICAL THERAPIST

## 2024-04-08 ENCOUNTER — THERAPY VISIT (OUTPATIENT)
Dept: PHYSICAL THERAPY | Facility: CLINIC | Age: 69
End: 2024-04-08
Payer: COMMERCIAL

## 2024-04-08 DIAGNOSIS — R68.89 DECREASED ACTIVITY TOLERANCE: ICD-10-CM

## 2024-04-08 DIAGNOSIS — M62.81 GENERALIZED MUSCLE WEAKNESS: ICD-10-CM

## 2024-04-08 DIAGNOSIS — I63.9 CEREBROVASCULAR ACCIDENT (CVA), UNSPECIFIED MECHANISM (H): Primary | ICD-10-CM

## 2024-04-08 DIAGNOSIS — R26.89 IMPAIRMENT OF BALANCE: ICD-10-CM

## 2024-04-08 PROCEDURE — 97112 NEUROMUSCULAR REEDUCATION: CPT | Mod: GP

## 2024-04-08 PROCEDURE — 97110 THERAPEUTIC EXERCISES: CPT | Mod: GP

## 2024-04-09 ENCOUNTER — TELEPHONE (OUTPATIENT)
Dept: NEUROLOGY | Facility: CLINIC | Age: 69
End: 2024-04-09

## 2024-04-13 DIAGNOSIS — R41.3 MEMORY DIFFICULTIES: ICD-10-CM

## 2024-04-15 ENCOUNTER — LAB (OUTPATIENT)
Dept: LAB | Facility: CLINIC | Age: 69
End: 2024-04-15
Payer: COMMERCIAL

## 2024-04-15 ENCOUNTER — THERAPY VISIT (OUTPATIENT)
Dept: PHYSICAL THERAPY | Facility: CLINIC | Age: 69
End: 2024-04-15
Payer: COMMERCIAL

## 2024-04-15 ENCOUNTER — ANTICOAGULATION THERAPY VISIT (OUTPATIENT)
Dept: ANTICOAGULATION | Facility: CLINIC | Age: 69
End: 2024-04-15

## 2024-04-15 ENCOUNTER — TELEPHONE (OUTPATIENT)
Dept: ANTICOAGULATION | Facility: CLINIC | Age: 69
End: 2024-04-15

## 2024-04-15 DIAGNOSIS — I48.0 PAF (PAROXYSMAL ATRIAL FIBRILLATION) (H): Primary | ICD-10-CM

## 2024-04-15 DIAGNOSIS — R26.89 IMPAIRMENT OF BALANCE: ICD-10-CM

## 2024-04-15 DIAGNOSIS — Z95.2 AORTIC VALVE PROSTHESIS PRESENT: ICD-10-CM

## 2024-04-15 DIAGNOSIS — I48.0 PAF (PAROXYSMAL ATRIAL FIBRILLATION) (H): ICD-10-CM

## 2024-04-15 DIAGNOSIS — R68.89 DECREASED ACTIVITY TOLERANCE: ICD-10-CM

## 2024-04-15 DIAGNOSIS — I63.9 CEREBROVASCULAR ACCIDENT (CVA), UNSPECIFIED MECHANISM (H): Primary | ICD-10-CM

## 2024-04-15 DIAGNOSIS — M62.81 GENERALIZED MUSCLE WEAKNESS: ICD-10-CM

## 2024-04-15 LAB — INR BLD: 3.7 (ref 0.9–1.1)

## 2024-04-15 PROCEDURE — 85610 PROTHROMBIN TIME: CPT

## 2024-04-15 PROCEDURE — 97110 THERAPEUTIC EXERCISES: CPT | Mod: GP

## 2024-04-15 PROCEDURE — 97112 NEUROMUSCULAR REEDUCATION: CPT | Mod: GP

## 2024-04-15 PROCEDURE — 36415 COLL VENOUS BLD VENIPUNCTURE: CPT

## 2024-04-15 RX ORDER — DONEPEZIL HYDROCHLORIDE 10 MG/1
10 TABLET, FILM COATED ORAL AT BEDTIME
Qty: 90 TABLET | Refills: 3 | Status: SHIPPED | OUTPATIENT
Start: 2024-04-15

## 2024-04-15 NOTE — TELEPHONE ENCOUNTER
ANTICOAGULATION     Nicko S Aschoff is overdue for an INR check. Patient was due for INR check 4/12/24    Spoke with Nicko and scheduled lab appointment on 4/15/24, lab ok'd appointment    Lucina Andrew RN

## 2024-04-15 NOTE — PROGRESS NOTES
ANTICOAGULATION MANAGEMENT     Todd S Aschoff 68 year old male is on warfarin with supratherapeutic INR result. (Goal INR Other - see comment)    Recent labs: (last 7 days)     04/15/24  1339   INR 3.7*       ASSESSMENT     Source(s): Chart Review and Patient/Caregiver Call     Warfarin doses taken: Warfarin taken as instructed  Diet: Decreased greens/vitamin K in diet; plans to resume previous intake, patient reports he has not had any green veggies the last 3 days  Medication/supplement changes: None noted  New illness, injury, or hospitalization: No  Signs or symptoms of bleeding or clotting: No  Previous result: Therapeutic last 2(+) visits  Additional findings: None       PLAN     Recommended plan for temporary change(s) affecting INR     Dosing Instructions: Continue your current warfarin dose with next INR in 7-10 days       Summary  As of 4/15/2024      Full warfarin instructions:  7.5 mg every Sun, Tue, Thu; 5 mg all other days   Next INR check:  4/25/2024               Telephone call with Nicko who verbalizes understanding and agrees to plan    Check at provider office visit 4/25/24    Education provided:   Please call back if any changes to your diet, medications or how you've been taking warfarin  Contact [phone]  with any changes, questions or concerns.     Plan made with [facility] Pharmacist Jazmine Andrew RN  Anticoagulation Clinic  4/15/2024    _______________________________________________________________________     Anticoagulation Episode Summary       Current INR goal:  Other - see comment   TTR:  58.8% (11.9 mo)   Target end date:  Indefinite   Send INR reminders to:  [facility]    Indications    PAF (paroxysmal atrial fibrillation) (H) [I48.0]  Mechanical AV Replacement 1992 -- on Warfarin  [Z95.2]             Comments:  3/20/24-goal 2.8-3.5             Anticoagulation Care Providers       Provider Role Specialty Phone number    Obdulio Ingram MD Referring Internal  Medicine [phone]    Herve Morgan MD Referring Internal Medicine - Pediatrics [phone]

## 2024-04-19 ENCOUNTER — THERAPY VISIT (OUTPATIENT)
Dept: PHYSICAL THERAPY | Facility: CLINIC | Age: 69
End: 2024-04-19
Payer: COMMERCIAL

## 2024-04-19 DIAGNOSIS — I63.9 CEREBROVASCULAR ACCIDENT (CVA), UNSPECIFIED MECHANISM (H): Primary | ICD-10-CM

## 2024-04-19 DIAGNOSIS — M62.81 GENERALIZED MUSCLE WEAKNESS: ICD-10-CM

## 2024-04-19 DIAGNOSIS — R68.89 DECREASED ACTIVITY TOLERANCE: ICD-10-CM

## 2024-04-19 DIAGNOSIS — R26.89 IMPAIRMENT OF BALANCE: ICD-10-CM

## 2024-04-19 PROCEDURE — 97110 THERAPEUTIC EXERCISES: CPT | Mod: GP | Performed by: PHYSICAL THERAPIST

## 2024-04-19 PROCEDURE — 97112 NEUROMUSCULAR REEDUCATION: CPT | Mod: GP | Performed by: PHYSICAL THERAPIST

## 2024-04-22 ENCOUNTER — THERAPY VISIT (OUTPATIENT)
Dept: PHYSICAL THERAPY | Facility: CLINIC | Age: 69
End: 2024-04-22
Payer: COMMERCIAL

## 2024-04-22 DIAGNOSIS — I63.9 CEREBROVASCULAR ACCIDENT (CVA), UNSPECIFIED MECHANISM (H): Primary | ICD-10-CM

## 2024-04-22 DIAGNOSIS — R26.89 IMPAIRMENT OF BALANCE: ICD-10-CM

## 2024-04-22 DIAGNOSIS — M62.81 GENERALIZED MUSCLE WEAKNESS: ICD-10-CM

## 2024-04-22 DIAGNOSIS — R68.89 DECREASED ACTIVITY TOLERANCE: ICD-10-CM

## 2024-04-22 PROCEDURE — 97110 THERAPEUTIC EXERCISES: CPT | Mod: GP

## 2024-04-25 ENCOUNTER — OFFICE VISIT (OUTPATIENT)
Dept: PEDIATRICS | Facility: CLINIC | Age: 69
End: 2024-04-25
Payer: COMMERCIAL

## 2024-04-25 ENCOUNTER — ANTICOAGULATION THERAPY VISIT (OUTPATIENT)
Dept: ANTICOAGULATION | Facility: CLINIC | Age: 69
End: 2024-04-25

## 2024-04-25 VITALS
HEIGHT: 72 IN | BODY MASS INDEX: 41.87 KG/M2 | OXYGEN SATURATION: 97 % | DIASTOLIC BLOOD PRESSURE: 72 MMHG | SYSTOLIC BLOOD PRESSURE: 107 MMHG | TEMPERATURE: 98.8 F | RESPIRATION RATE: 18 BRPM | WEIGHT: 309.1 LBS | HEART RATE: 84 BPM

## 2024-04-25 DIAGNOSIS — M62.830 BACK MUSCLE SPASM: ICD-10-CM

## 2024-04-25 DIAGNOSIS — F10.21 ALCOHOL DEPENDENCE IN REMISSION (H): ICD-10-CM

## 2024-04-25 DIAGNOSIS — Z95.2 AORTIC VALVE PROSTHESIS PRESENT: ICD-10-CM

## 2024-04-25 DIAGNOSIS — E03.9 HYPOTHYROIDISM, UNSPECIFIED TYPE: ICD-10-CM

## 2024-04-25 DIAGNOSIS — Z29.11 NEED FOR VACCINATION AGAINST RESPIRATORY SYNCYTIAL VIRUS: ICD-10-CM

## 2024-04-25 DIAGNOSIS — E66.01 MORBID OBESITY, UNSPECIFIED OBESITY TYPE (H): ICD-10-CM

## 2024-04-25 DIAGNOSIS — H91.92 HEARING LOSS OF LEFT EAR, UNSPECIFIED HEARING LOSS TYPE: ICD-10-CM

## 2024-04-25 DIAGNOSIS — I48.0 PAF (PAROXYSMAL ATRIAL FIBRILLATION) (H): ICD-10-CM

## 2024-04-25 DIAGNOSIS — E03.9 ACQUIRED HYPOTHYROIDISM: ICD-10-CM

## 2024-04-25 DIAGNOSIS — I48.0 PAF (PAROXYSMAL ATRIAL FIBRILLATION) (H): Primary | ICD-10-CM

## 2024-04-25 DIAGNOSIS — M54.16 LUMBAR RADICULOPATHY: ICD-10-CM

## 2024-04-25 DIAGNOSIS — I10 BENIGN ESSENTIAL HYPERTENSION: ICD-10-CM

## 2024-04-25 DIAGNOSIS — I63.89 RIGHT TEMPORAL LOBE INFARCTION (H): ICD-10-CM

## 2024-04-25 DIAGNOSIS — F32.A DEPRESSION, UNSPECIFIED DEPRESSION TYPE: ICD-10-CM

## 2024-04-25 DIAGNOSIS — F95.2 TOURETTE SYNDROME: ICD-10-CM

## 2024-04-25 DIAGNOSIS — I63.9 CEREBROVASCULAR ACCIDENT (CVA), UNSPECIFIED MECHANISM (H): Primary | ICD-10-CM

## 2024-04-25 DIAGNOSIS — G40.909 NONINTRACTABLE EPILEPSY WITHOUT STATUS EPILEPTICUS, UNSPECIFIED EPILEPSY TYPE (H): ICD-10-CM

## 2024-04-25 PROBLEM — T50.912A: Status: RESOLVED | Noted: 2020-11-27 | Resolved: 2024-04-25

## 2024-04-25 PROBLEM — R20.2 PARESTHESIAS: Status: RESOLVED | Noted: 2021-06-18 | Resolved: 2024-04-25

## 2024-04-25 PROBLEM — R68.89 SPELLS OF DECREASED ATTENTIVENESS: Status: RESOLVED | Noted: 2021-06-18 | Resolved: 2024-04-25

## 2024-04-25 LAB
ALBUMIN UR-MCNC: ABNORMAL MG/DL
APPEARANCE UR: CLEAR
BILIRUB UR QL STRIP: NEGATIVE
COLOR UR AUTO: YELLOW
GLUCOSE UR STRIP-MCNC: NEGATIVE MG/DL
HGB UR QL STRIP: NEGATIVE
INR BLD: 2.4 (ref 0.9–1.1)
KETONES UR STRIP-MCNC: ABNORMAL MG/DL
LEUKOCYTE ESTERASE UR QL STRIP: NEGATIVE
NITRATE UR QL: NEGATIVE
PH UR STRIP: 6.5 [PH] (ref 5–7)
SP GR UR STRIP: >=1.03 (ref 1–1.03)
UROBILINOGEN UR STRIP-ACNC: 1 E.U./DL

## 2024-04-25 PROCEDURE — 84443 ASSAY THYROID STIM HORMONE: CPT | Performed by: INTERNAL MEDICINE

## 2024-04-25 PROCEDURE — 36415 COLL VENOUS BLD VENIPUNCTURE: CPT | Performed by: INTERNAL MEDICINE

## 2024-04-25 PROCEDURE — 85610 PROTHROMBIN TIME: CPT | Performed by: INTERNAL MEDICINE

## 2024-04-25 PROCEDURE — 36416 COLLJ CAPILLARY BLOOD SPEC: CPT | Performed by: INTERNAL MEDICINE

## 2024-04-25 PROCEDURE — 81003 URINALYSIS AUTO W/O SCOPE: CPT | Mod: QW

## 2024-04-25 PROCEDURE — 99214 OFFICE O/P EST MOD 30 MIN: CPT | Mod: 25 | Performed by: INTERNAL MEDICINE

## 2024-04-25 PROCEDURE — 91320 SARSCV2 VAC 30MCG TRS-SUC IM: CPT | Performed by: INTERNAL MEDICINE

## 2024-04-25 PROCEDURE — 90480 ADMN SARSCOV2 VAC 1/ONLY CMP: CPT | Performed by: INTERNAL MEDICINE

## 2024-04-25 RX ORDER — CYCLOBENZAPRINE HCL 10 MG
10 TABLET ORAL 3 TIMES DAILY PRN
Qty: 270 TABLET | Refills: 3 | Status: SHIPPED | OUTPATIENT
Start: 2024-04-25

## 2024-04-25 RX ORDER — RESPIRATORY SYNCYTIAL VIRUS VACCINE 120MCG/0.5
0.5 KIT INTRAMUSCULAR ONCE
Qty: 1 EACH | Refills: 0 | Status: CANCELLED | OUTPATIENT
Start: 2024-04-25 | End: 2024-04-25

## 2024-04-25 RX ORDER — ATORVASTATIN CALCIUM 40 MG/1
40 TABLET, FILM COATED ORAL EVERY EVENING
Qty: 90 TABLET | Refills: 3 | Status: SHIPPED | OUTPATIENT
Start: 2024-04-25

## 2024-04-25 RX ORDER — DIAZEPAM 5 MG
5 TABLET ORAL
Qty: 30 TABLET | Refills: 0 | Status: SHIPPED | OUTPATIENT
Start: 2024-04-25 | End: 2024-05-23

## 2024-04-25 RX ORDER — VENLAFAXINE HYDROCHLORIDE 75 MG/1
225 CAPSULE, EXTENDED RELEASE ORAL DAILY
Qty: 270 CAPSULE | Refills: 3 | Status: SHIPPED | OUTPATIENT
Start: 2024-04-25

## 2024-04-25 RX ORDER — LEVOTHYROXINE SODIUM 175 UG/1
175 TABLET ORAL DAILY
Qty: 90 TABLET | Refills: 3 | Status: SHIPPED | OUTPATIENT
Start: 2024-04-25

## 2024-04-25 ASSESSMENT — PAIN SCALES - GENERAL: PAINLEVEL: MODERATE PAIN (5)

## 2024-04-25 NOTE — PROGRESS NOTES
"ANTICOAGULATION MANAGEMENT     Todd S Aschoff 68 year old male is on warfarin with subtherapeutic INR result. (Goal INR  2.8-3.5 )    Recent labs: (last 7 days)     04/25/24  1445   INR 2.4*       ASSESSMENT     Source(s): Chart Review  Previous INR was Supratherapeutic  Medication, diet, health changes since last INR chart reviewed; none identified         PLAN     Unable to reach Nicko today.    Left message to take a booster dose of warfarin,  10 mg tonight. Request call back for assessment.    Consulted pharmacist due to nonstandard goal range. Per Nolvia Andrew [facility]: \"Patient had lower greens last time, probably ramped those up, if temp increased/factor for sure boost today to 10mg, If plans to continue that level I'd change Saturday to 7.5mg on MD and could check next week when back on the 30th or 5/6 \"    Follow up required to confirm warfarin dose taken and assess for changes    Brandi Ruelas RN  Anticoagulation Clinic  4/25/2024    "

## 2024-04-25 NOTE — PROGRESS NOTES
"  Assessment & Plan     Need for vaccination against respiratory syncytial virus  At a [facility].     Hypothyroidism, unspecified type  Due. Ordered.   - TSH WITH FREE T4 REFLEX; Future  - TSH WITH FREE T4 REFLEX    Back muscle spasm  Ongoing at bedtime valium.  No signs of misuse.   - diazepam (VALIUM) 5 MG tablet; Take 1 tablet (5 mg) by mouth nightly as needed for anxiety    Benign essential hypertension  At goal.  Ongoing blood pressure meds, refilled.     Nonintractable epilepsy without status epilepticus, unspecified epilepsy type (H)  No further symptoms, even after recent CVA.     Alcohol dependence in remission (H)  Successful sobriety.     Morbid obesity, unspecified obesity type (H)  Successful 10 pound weight loss.     Hearing loss of left ear, unspecified hearing loss type  Ongoing.  Hearing aids.     Hx of CVA x 4, 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  Has raised his INR goal to 2.7-3.5.  INR today.     Lumbar radiculopathy  Ongoing pain; no additional treatment other than pain mes.   - cyclobenzaprine (FLEXERIL) 10 MG tablet; Take 1 tablet (10 mg) by mouth 3 times daily as needed for muscle spasms    Mechanical AV Replacement 1992 -- on Warfarin   Lifelong coumadin.   - INR point of care    PAF (paroxysmal atrial fibrillation) (H)  Lifelong coumaid.   - INR point of care    Tourette syndrome  Stable guanfacine dosing.     Right temporal lobe infarction (H)  Secondary risk factor modification.   - atorvastatin (LIPITOR) 40 MG tablet; Take 1 tablet (40 mg) by mouth every evening    Acquired hypothyroidism  Thyroid stimulating hormone (TSH) today.   - levothyroxine (SYNTHROID/LEVOTHROID) 175 MCG tablet; Take 1 tablet (175 mcg) by mouth daily    Depression, unspecified depression type  Stable.   - venlafaxine (EFFEXOR XR) 75 MG 24 hr capsule; Take 3 capsules (225 mg) by mouth daily            BMI  Estimated body mass index is 41.41 kg/m  as calculated from the following:    Height as of this encounter: 1.84 m (6' 0.44\").    " Weight as of this encounter: 140.2 kg (309 lb 1.6 oz).   Weight management plan: Discussed healthy diet and exercise guidelines          Obinna Maharaj is a 68 year old, presenting for the following health issues:  RECHECK      4/25/2024     1:33 PM   Additional Questions   Roomed by Bessie Hughes   Accompanied by N/A         4/25/2024     1:33 PM   Patient Reported Additional Medications   Patient reports taking the following new medications No     History of Present Illness       Back Pain:  He presents for follow up of back pain. Patient's back pain is a recurring problem.  Location of back pain:  Right lower back  Description of back pain: dull ache  Back pain spreads: nowhere    Since patient first noticed back pain, pain is: rapidly improving  Does back pain interfere with his job:  Not applicable       Mental Health Follow-up:  Patient presents to follow-up on Depression.Patient's depression since last visit has been:  Good  The patient is not having other symptoms associated with depression.      Any significant life events: No  Patient is not feeling anxious or having panic attacks.  Patient has no concerns about alcohol or drug use.    Hyperlipidemia:  He presents for follow up of hyperlipidemia.   He is taking medication to lower cholesterol. He is not having myalgia or other side effects to statin medications.    Hypertension: He presents for follow up of hypertension.  He does not check blood pressure  regularly outside of the clinic. Outside blood pressures have been over 140/90. He follows a low salt diet.     Hypothyroidism:     Since last visit, patient describes the following symptoms::  None    He eats 2-3 servings of fruits and vegetables daily.He consumes 0 sweetened beverage(s) daily.He exercises with enough effort to increase his heart rate 9 or less minutes per day.  He exercises with enough effort to increase his heart rate 7 days per week.   He is taking medications regularly.    "    Has lost weight intentionally.  THought \"would lose more\" with his diet.    Still trying to walk and eat right.    Back pain:   has been doing core strengthening; does walking, and notes that back pain worsens with longer distances.    Looking for a back brace.    Recently admitted for CVA:  Had right CBL and frontal infarct, now with residual left facial numbness and left hearing loss. Wears hearing aids.  Doing physical therapy still.        Hospital Course  Todd S Aschoff is a 68 year old male with multiple medical problems including history of aortic dissection repair and mechanical aortic valve replacement in 1992 and paroxysmal atrial fibrillation on warfarin, chronic back pain/neuropathy, HTN, HLD, (dents to the emergency department with left facial numbness.  Stroke workup including head CT, CTAHN showed no acute changes.      Principal Problem:   Acute ischemic stroke of the right lateral cerebellum and bilateral cortical parietal lobes, likely cardioembolic  Brain MRI shows, \"small recent ischemic infarcts at the lateral right cerebellar hemisphere and at the posterolateral right frontal lobe consistent with embolic infarcts from a central embolic source.\"  Stroke neurology consult requested, I appreciate Teresita Phipps's evaluation and recommendations.    Per them, \"Dr. Marcus and I discussed with Nicko suspected etiology of his stroke was due to subtherapeutic INR and new recommendation for INR goal to be between 2.8- 3.5.\"  Also per stroke neurology, begin aspirin 81 mg daily until INR is within goal then discontinue aspirin.  They discontinued aspirin 3/20.     Active Problems:    PAF (paroxysmal atrial fibrillation) (H)  Continue propafenone for ventricular rate/rhythm control  Continue warfarin for thromboembolism prevention, pharmacy to dose.  See further discussion below re: INR       Hypothyroidism  Continue thyroid hormone       Mechanical AV Replacement 1992 -- on Warfarin   Continue " "warfarin, Pharm.D. to dose  See discussion above regarding new INR goal       Memory difficulties  Continue Aricept       Depression, anxiety  Continue PTA Effexor, guanfacine, Valium       Chronic pain  Continue PTA Lyrica       Hx of CVA x 4, 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  See discussion above              Review of Systems  Constitutional, HEENT, cardiovascular, pulmonary, GI, , musculoskeletal, neuro, skin, endocrine and psych systems are negative, except as otherwise noted.      Objective    /72 (BP Location: Right arm, Patient Position: Sitting, Cuff Size: Adult Large)   Pulse 84   Temp 98.8  F (37.1  C) (Temporal)   Resp 18   Ht 1.84 m (6' 0.44\")   Wt 140.2 kg (309 lb 1.6 oz)   SpO2 97%   BMI 41.41 kg/m    Body mass index is 41.41 kg/m .  Physical Exam   GENERAL: alert and no distress  EYES: Eyes grossly normal to inspection, PERRL and conjunctivae and sclerae normal  HENT: ear canals and TM's normal, nose and mouth without ulcers or lesions  NECK: no adenopathy, no asymmetry, masses, or scars  RESP: lungs clear to auscultation - no rales, rhonchi or wheezes  CV: regular rate and rhythm, normal S1 S2, no S3 or S4, no murmur, click or rub, no peripheral edema  ABDOMEN: soft, nontender, no hepatosplenomegaly, no masses and bowel sounds normal  MS: no gross musculoskeletal defects noted, no edema  SKIN: no suspicious lesions or rashes  NEURO: Normal strength and tone, mentation intact and speech normal  PSYCH: mentation appears normal, affect normal/bright            Signed Electronically by: Herve Morgan MD    "

## 2024-04-25 NOTE — PATIENT INSTRUCTIONS
RSV shot at a pharmacy.    COVID 19 shot today.    Lab work today:       Refilled your diazepam today.    No changes in your meds unless the INR nurse changes coumadin.    Blood pressure looks good today.    Colon cancer 2025.    Herve Morgan MD  Internal Medicine and Pediatrics

## 2024-04-26 ENCOUNTER — THERAPY VISIT (OUTPATIENT)
Dept: PHYSICAL THERAPY | Facility: CLINIC | Age: 69
End: 2024-04-26
Payer: COMMERCIAL

## 2024-04-26 DIAGNOSIS — I63.9 CEREBROVASCULAR ACCIDENT (CVA), UNSPECIFIED MECHANISM (H): ICD-10-CM

## 2024-04-26 DIAGNOSIS — R26.89 IMPAIRMENT OF BALANCE: Primary | ICD-10-CM

## 2024-04-26 DIAGNOSIS — M62.81 GENERALIZED MUSCLE WEAKNESS: ICD-10-CM

## 2024-04-26 DIAGNOSIS — R68.89 DECREASED ACTIVITY TOLERANCE: ICD-10-CM

## 2024-04-26 LAB — TSH SERPL DL<=0.005 MIU/L-ACNC: 0.44 UIU/ML (ref 0.3–4.2)

## 2024-04-26 PROCEDURE — 97112 NEUROMUSCULAR REEDUCATION: CPT | Mod: GP | Performed by: PHYSICAL THERAPIST

## 2024-04-26 PROCEDURE — 97110 THERAPEUTIC EXERCISES: CPT | Mod: GP | Performed by: PHYSICAL THERAPIST

## 2024-04-26 NOTE — PROGRESS NOTES
ANTICOAGULATION MANAGEMENT     Todd S Aschoff 68 year old male is on warfarin with subtherapeutic INR result. (Goal INR Other - see comment)    Recent labs: (last 7 days)     04/25/24  1445   INR 2.4*       ASSESSMENT     Source(s): Chart Review and Patient/Caregiver Call     Warfarin doses taken: Warfarin taken as instructed  Diet: No new diet changes identified - Nicko returned to his usual greens intake. Strives for 1-2 servings weekly.  Medication/supplement changes: None noted  New illness, injury, or hospitalization: No  Signs or symptoms of bleeding or clotting: No  Previous result: Supratherapeutic  Additional findings: See [facility] consult notes below regarding dosing increase. Nicko also reports he received the message to hold yesterday.       PLAN     Recommended plan for no diet, medication or health factor changes affecting INR     Dosing Instructions: booster dose then Increase your warfarin dose (5.9% change) with next INR in 5 days       Summary  As of 4/25/2024      Full warfarin instructions:  4/25: 10 mg; Otherwise 5 mg every Mon, Wed, Fri; 7.5 mg all other days   Next INR check:  4/30/2024               Telephone call with Nicko who verbalizes understanding and agrees to plan    Lab visit scheduled    Education provided:   Please call back if any changes to your diet, medications or how you've been taking warfarin  Dietary considerations: importance of consistent vitamin K intake and importance of notifying [facility] to changes in diet    Plan made with [facility] Pharmacist Nolvia Andrew, RN  Anticoagulation Clinic  4/26/2024    _______________________________________________________________________     Anticoagulation Episode Summary       Current INR goal:  Other - see comment   TTR:  60.2% (11.8 mo)   Target end date:  Indefinite   Send INR reminders to:  [facility]    Indications    PAF (paroxysmal atrial fibrillation) (H) [I48.0]  Mechanical AV Replacement 1992 -- on Warfarin   [Z95.2]             Comments:  3/20/24-goal 2.8-3.5             Anticoagulation Care Providers       Provider Role Specialty Phone number    Obdulio Ingram MD Referring Internal Medicine [phone]    Herve Morgan MD Referring Internal Medicine - Pediatrics [phone]

## 2024-05-01 ENCOUNTER — TELEPHONE (OUTPATIENT)
Dept: ANTICOAGULATION | Facility: CLINIC | Age: 69
End: 2024-05-01
Payer: COMMERCIAL

## 2024-05-01 NOTE — TELEPHONE ENCOUNTER
ANTICOAGULATION     Nicko S Aschoff is overdue for an INR check.     Left message for patient to call and schedule lab appointment as soon as possible. If returning call, please schedule.     Angely Smalls RN

## 2024-05-06 ENCOUNTER — THERAPY VISIT (OUTPATIENT)
Dept: PHYSICAL THERAPY | Facility: CLINIC | Age: 69
End: 2024-05-06
Payer: COMMERCIAL

## 2024-05-06 DIAGNOSIS — I63.9 CEREBROVASCULAR ACCIDENT (CVA), UNSPECIFIED MECHANISM (H): Primary | ICD-10-CM

## 2024-05-06 DIAGNOSIS — M62.81 GENERALIZED MUSCLE WEAKNESS: ICD-10-CM

## 2024-05-06 DIAGNOSIS — R26.89 IMPAIRMENT OF BALANCE: ICD-10-CM

## 2024-05-06 DIAGNOSIS — R68.89 DECREASED ACTIVITY TOLERANCE: ICD-10-CM

## 2024-05-06 PROCEDURE — 97110 THERAPEUTIC EXERCISES: CPT | Mod: GP

## 2024-05-06 PROCEDURE — 97112 NEUROMUSCULAR REEDUCATION: CPT | Mod: GP

## 2024-05-08 ENCOUNTER — ANTICOAGULATION THERAPY VISIT (OUTPATIENT)
Dept: ANTICOAGULATION | Facility: CLINIC | Age: 69
End: 2024-05-08

## 2024-05-08 ENCOUNTER — LAB (OUTPATIENT)
Dept: LAB | Facility: CLINIC | Age: 69
End: 2024-05-08
Payer: COMMERCIAL

## 2024-05-08 DIAGNOSIS — Z95.2 AORTIC VALVE PROSTHESIS PRESENT: ICD-10-CM

## 2024-05-08 DIAGNOSIS — I48.0 PAF (PAROXYSMAL ATRIAL FIBRILLATION) (H): ICD-10-CM

## 2024-05-08 DIAGNOSIS — I48.0 PAF (PAROXYSMAL ATRIAL FIBRILLATION) (H): Primary | ICD-10-CM

## 2024-05-08 LAB — INR BLD: 3.1 (ref 0.9–1.1)

## 2024-05-08 PROCEDURE — 36415 COLL VENOUS BLD VENIPUNCTURE: CPT

## 2024-05-08 PROCEDURE — 85610 PROTHROMBIN TIME: CPT

## 2024-05-08 NOTE — PROGRESS NOTES
ANTICOAGULATION MANAGEMENT     Todd S Aschoff 68 year old male is on warfarin with therapeutic INR result. (Goal INR Other - 2.8-3.5)    Recent labs: (last 7 days)     05/08/24  1503   INR 3.1*       ASSESSMENT     Source(s): Chart Review  Previous INR was Subtherapeutic  Medication, diet, health changes since last INR chart reviewed; none identified         PLAN     Recommended plan for no diet, medication or health factor changes affecting INR     Dosing Instructions: Continue your current warfarin dose with next INR in 10 days       Summary  As of 5/8/2024      Full warfarin instructions:  5 mg every Mon, Wed, Fri; 7.5 mg all other days   Next INR check:  5/20/2024               Detailed voice message left for Nicko with dosing instructions and follow up date.   Sent [employer] message with dosing and follow up instructions    Lab visit scheduled    Education provided:   Please call back if any changes to your diet, medications or how you've been taking warfarin    Plan made per [facility] anticoagulation protocol    Amanda Wheeler RN  Anticoagulation Clinic  5/8/2024    _______________________________________________________________________     Anticoagulation Episode Summary       Current INR goal:  Other - see comment   TTR:  63.2% (11.9 mo)   Target end date:  Indefinite   Send INR reminders to:  [facility]    Indications    PAF (paroxysmal atrial fibrillation) (H) [I48.0]  Mechanical AV Replacement 1992 -- on Warfarin  [Z95.2]             Comments:  3/20/24-goal 2.8-3.5             Anticoagulation Care Providers       Provider Role Specialty Phone number    Obdulio Ingram MD Referring Internal Medicine [phone]    Herve Morgan MD Referring Internal Medicine - Pediatrics [phone]

## 2024-05-13 ENCOUNTER — THERAPY VISIT (OUTPATIENT)
Dept: PHYSICAL THERAPY | Facility: CLINIC | Age: 69
End: 2024-05-13
Payer: COMMERCIAL

## 2024-05-13 DIAGNOSIS — M62.81 GENERALIZED MUSCLE WEAKNESS: ICD-10-CM

## 2024-05-13 DIAGNOSIS — I63.9 CEREBROVASCULAR ACCIDENT (CVA), UNSPECIFIED MECHANISM (H): Primary | ICD-10-CM

## 2024-05-13 DIAGNOSIS — R26.89 IMPAIRMENT OF BALANCE: ICD-10-CM

## 2024-05-13 DIAGNOSIS — R68.89 DECREASED ACTIVITY TOLERANCE: ICD-10-CM

## 2024-05-13 PROCEDURE — 97110 THERAPEUTIC EXERCISES: CPT | Mod: GP

## 2024-05-13 PROCEDURE — 97112 NEUROMUSCULAR REEDUCATION: CPT | Mod: GP

## 2024-05-20 ENCOUNTER — LAB (OUTPATIENT)
Dept: LAB | Facility: CLINIC | Age: 69
End: 2024-05-20
Payer: COMMERCIAL

## 2024-05-20 ENCOUNTER — THERAPY VISIT (OUTPATIENT)
Dept: PHYSICAL THERAPY | Facility: CLINIC | Age: 69
End: 2024-05-20
Payer: COMMERCIAL

## 2024-05-20 ENCOUNTER — ANTICOAGULATION THERAPY VISIT (OUTPATIENT)
Dept: ANTICOAGULATION | Facility: CLINIC | Age: 69
End: 2024-05-20

## 2024-05-20 DIAGNOSIS — I63.9 CEREBROVASCULAR ACCIDENT (CVA), UNSPECIFIED MECHANISM (H): Primary | ICD-10-CM

## 2024-05-20 DIAGNOSIS — R68.89 DECREASED ACTIVITY TOLERANCE: ICD-10-CM

## 2024-05-20 DIAGNOSIS — I48.0 PAF (PAROXYSMAL ATRIAL FIBRILLATION) (H): ICD-10-CM

## 2024-05-20 DIAGNOSIS — I48.0 PAF (PAROXYSMAL ATRIAL FIBRILLATION) (H): Primary | ICD-10-CM

## 2024-05-20 DIAGNOSIS — R26.89 IMPAIRMENT OF BALANCE: ICD-10-CM

## 2024-05-20 DIAGNOSIS — M62.81 GENERALIZED MUSCLE WEAKNESS: ICD-10-CM

## 2024-05-20 DIAGNOSIS — Z95.2 AORTIC VALVE PROSTHESIS PRESENT: ICD-10-CM

## 2024-05-20 LAB — INR BLD: 2.3 (ref 0.9–1.1)

## 2024-05-20 PROCEDURE — 97110 THERAPEUTIC EXERCISES: CPT | Mod: GP

## 2024-05-20 PROCEDURE — 97116 GAIT TRAINING THERAPY: CPT | Mod: GP

## 2024-05-20 PROCEDURE — 85610 PROTHROMBIN TIME: CPT

## 2024-05-20 PROCEDURE — 36416 COLLJ CAPILLARY BLOOD SPEC: CPT

## 2024-05-20 NOTE — PROGRESS NOTES
05/20/24 0500   Appointment Info   Signing clinician's name / credentials Jody Linares, PT, DPT, NCS   Visits Used 10   Medical Diagnosis Cerebrovascular accident (CVA), unspecified mechanism   PT Tx Diagnosis Impaired functional mobility and balance; Decreased Strength   Other pertinent information Hx of chronic LBP, left shoulder pain   Quick Adds Certification   Progress Note/Certification   Start of Care Date 03/27/24   Onset of illness/injury or Date of Surgery 03/17/24   Therapy Frequency 2x/week with reduction of frequency as appropriate   Predicted Duration 90 days   Certification date from 03/27/24   Certification date to 06/24/24   Progress Note Due Date 05/20/24  (10th visit)   Progress Note Completed Date 03/27/24   GOALS   PT Goals 2;3;4;5   PT Goal 1   Goal Identifier MET: HEP   Goal Description Pt will demonstrate IND with strength, balance, ROM, stretching, and muscular and aerobic endurance HEP, while working to improve capacity for functional mobility and minimize risk for falls.   Goal Progress 5/13/2024: Pt has demonstrated IND with HEP.   Target Date 06/24/24   Date Met 05/13/24   PT Goal 2   Goal Identifier NEARLY MET: 5xSTS   Goal Description Pt will complete x5 STS in <15s, with min use of B UEs, to demonstrate improvement in B LE strength and a reduced risk for falling.   Goal Progress 5/20/2024: 17.01s without use of B UEs from standard height chair. He's improved his leg strength, motor control, and safety with movement.  (Eval: 22.54 seconds with moderate use of bilateral upper extremities)   Target Date 06/24/24   PT Goal 3   Goal Identifier NEARLY MET: SMALLS   Goal Description Patient will demonstrate MDC from baseline Smalls Balance score indicating improved balance and decreased risk for falls.   Goal Progress 5/13/2024: 47/56 points; he's not at risk for falling with household activities. Has not yet met statistically significant level of improvement - will continue balance  training at home with IND.   Target Date 06/24/24   PT Goal 4   Goal Identifier MET: 10 MWT   Goal Description Patient will achieve MDC for self-selected and fast gait speed to show improved gait efficiency, maynor, and mechanics to reduce risk for falling.   Goal Progress 5/13/2024: Pt with self-selected of 0.88m/s, and fast pace of 1.15m/s; he's met the MDC for improved ambulation speed at both comfortable and fast paces.  (Eval: self-selected 0.755 m/s and fast pace 0.989)   Target Date 06/24/24   Date Met 05/13/24   PT Goal 5   Goal Identifier MET: Stairs   Goal Description Patient will be able to ascend/descend a flight of stairs with modified independence using a reciprocal pattern demonstrating improved safety and ease of performing task.   Goal Progress 5/13/2024: Pt able to ascend/descend reciprocally with U HR and Mod-I. Pt with safe technique and controlled motions.  (Eval: reports side steps up/down with use of rail)   Target Date 06/24/24   Date Met 05/13/24   Subjective Report   Subjective Report Pt reporting his back continues to feel pain free, unless he doesn't do stretches in the morning. He went to his grandson's baseball game. He had to walk 1/4 mile to/from the field; had significant back pain - he hadn't stretched in the morning. He had a fall when back to the car 2' dip in the sidewalk he didn't see. He got up independently. Reporting he is considering using SPC to help leg weakness and pain in the back. He also wants to get a stronger back brace. He has implemented the stretching for his ankles, which feels good. Continues HEP daily. Reporting increase in family stress, as his daughter is in the hospital. He feels ready for discharge today, as planned.   Treatment Interventions (PT)   Interventions Therapeutic Procedure/Exercise;Gait Training   Therapeutic Procedure/Exercise   Therapeutic Procedures: strength, endurance, ROM, flexibility minutes (79614) 30   Skilled Intervention aerobic  conditioning, strengthening, pt edu, HEP   Patient Response/Progress Participating in SciFit with bilateral upper and lower extremities at RL 5 for a total of 10 minutes with average steps/minute >70-80 s; addressing aerobic conditioning and functional strengthening. Continued education regarding OMNI effort scale and working into  green zone  with patient verbalizing understanding - rating OMNI RPE 8-9/10. Felt like the pushing/pulling required increased effort today. Had slight SOB with the activity. Pt provided with seated break to follow. PT provided pt edu for continuing his back pain management program daily, strenghtening exercises, and balance program. He cannot stop moving, or his current level of improvement will go down. He stated understanding. Reviewed all HEP exericses - see PTRx; handout issued. Completed 5xSTS; see goal above. He's improved LE strength.   Gait Training   Gait Training Minutes, includes stair climbing (30485) 15   Skilled Intervention assistive device training; pt edu   Patient Response/Progress Completed gait activities with use of SPC versus walking stick; pt not agreeable to four-wheel walker.  Patient used device in right upper extremity.  PT recommending device for times of increased back pain, when ambulating prolonged distance, or when on unstable surface.  Patient ambulated 100 feet x1, with each assistive device. PT recommending use of walking stick over SPC; set at 46 inches. Allows for increased upright posture, stability on uneven surfaces, and ability to fold/pack well.  Patient in agreement.  He will look into purchasing.  Reviewed fall prevention strategies of looking at surface he is walking on, taking breaks when needed, and doing back stretches in the morning to facilitate strong upright posture with ambulation.  Patient in agreement.   Education   Learner/Method Patient;Listening;No Barriers to Learning;Demonstration   Education Comments Continue with current HEP    Plan   Home program Complete updated PTRx program, daily.   Plan for next session Discharged.   Total Session Time   Timed Code Treatment Minutes 45   Total Treatment Time (sum of timed and untimed services) 45       DISCHARGE  Reason for Discharge: Patient has nearly met all goals, and significant improved his functional capacity.    Equipment Issued: Walking stick recommended for purchase; see note above.    Discharge Plan: Patient to continue home program.    Referring Provider:  Cindy Reyes MD

## 2024-05-20 NOTE — PROGRESS NOTES
"ANTICOAGULATION MANAGEMENT     Todd S Aschoff 68 year old male is on warfarin with subtherapeutic INR result. (Goal INR Other - see comment)    Recent labs: (last 7 days)     05/20/24  1035   INR 2.3*       ASSESSMENT     Source(s): Chart Review and Patient/Caregiver Call     Warfarin doses taken: Warfarin taken as instructed  Diet: Decreased greens/vitamin K in diet; plans to resume previous intake - typically has 1 serving of greens weekly but did not have any the past 2 weeks (in an attempt to increase INR)  Medication/supplement changes: None noted - continues usual tylenol regimen (1,000 mg am and pm)  New illness, injury, or hospitalization: Yes: fell on the sidewalk last week. Reports that he did not hit his head, rather \"fell to right side and arm was up.\" Has abrasions on wrist and arm.  Signs or symptoms of bleeding or clotting: Yes: bruising on his elbow after last week's fall  Previous result: Therapeutic last visit; previously outside of goal range  Additional findings: Next INR scheduled at [place] lab to coincide with neurology OV, then will return back to [facility] for labs.       PLAN     Recommended plan for no diet, medication or health factor changes affecting INR     Dosing Instructions: booster dose then Increase your warfarin dose (11.1% change) with next INR in 8 days       Summary  As of 5/20/2024      Full warfarin instructions:  5/20: 10 mg; Otherwise 5 mg every Fri; 7.5 mg all other days   Next INR check:  5/28/2024               Telephone call with Nicko who verbalizes understanding and agrees to plan    Lab visit scheduled    Education provided:   Please call back if any changes to your diet, medications or how you've been taking warfarin  Dietary considerations: importance of notifying [facility] to changes in diet    Plan made with [facility] Pharmacist Jazmine Andrew RN  Anticoagulation " Clinic  5/20/2024    _______________________________________________________________________     Anticoagulation Episode Summary       Current INR goal:  Other - see comment   TTR:  66.1% (11.9 mo)   Target end date:  Indefinite   Send INR reminders to:  AIDA GUERRERO    Indications    PAF (paroxysmal atrial fibrillation) (H) [I48.0]  Mechanical AV Replacement 1992 -- on Warfarin  [Z95.2]             Comments:  3/20/24-goal 2.8-3.5             Anticoagulation Care Providers       Provider Role Specialty Phone number    Obdulio Ingram MD Referring Internal Medicine [phone]    Herve Morgan MD Referring Internal Medicine - Pediatrics [phone]

## 2024-05-23 DIAGNOSIS — M62.830 BACK MUSCLE SPASM: ICD-10-CM

## 2024-05-23 RX ORDER — DIAZEPAM 5 MG
5 TABLET ORAL
Qty: 30 TABLET | Refills: 2 | Status: SHIPPED | OUTPATIENT
Start: 2024-05-23 | End: 2024-08-19

## 2024-05-24 NOTE — PROGRESS NOTES
ANTICOAGULATION MANAGEMENT     Todd S Aschoff 66 year old male is on warfarin with therapeutic INR result. (Goal INR 2.5-3.5)    Recent labs: (last 7 days)     08/04/22  1103   INR 2.5*       ASSESSMENT       Source(s): Chart Review    Previous INR was Therapeutic last 2(+) visits    Medication, diet, health changes since last INR chart reviewed; none identified           PLAN     Recommended plan for no diet, medication or health factor changes affecting INR     Dosing Instructions: Continue your current warfarin dose with next INR in 4 weeks       Summary  As of 8/4/2022    Full warfarin instructions:  5 mg every day   Next INR check:  9/1/2022             Detailed voice message left for Nicko with dosing instructions and follow up date.     Contact [phone]  to schedule and with any changes, questions or concerns.     Education provided: None required    Plan made per [facility] anticoagulation protocol    Amanda Wheeler RN  Anticoagulation Clinic  8/4/2022    _______________________________________________________________________     Anticoagulation Episode Summary     Current INR goal:  2.5-3.5   TTR:  67.0 % (1 y)   Target end date:  Indefinite   Send INR reminders to:  AIDA GUERRERO    Indications    PAF (paroxysmal atrial fibrillation) (H) [I48.0]  Long term current use of anticoagulant therapy (Resolved) [Z79.01]  Mechanical AV Replacement 1992 -- on Warfarin  [Z95.2]           Comments:           Anticoagulation Care Providers     Provider Role Specialty Phone number    Obdulio Ingram MD Referring Internal Medicine [phone]    Herve Morgan MD Referring Internal Medicine - Pediatrics [phone]           The ECG revealed a sinus rhythm. The ECG rate was 72 bpm.

## 2024-05-28 ENCOUNTER — ANTICOAGULATION THERAPY VISIT (OUTPATIENT)
Dept: ANTICOAGULATION | Facility: CLINIC | Age: 69
End: 2024-05-28

## 2024-05-28 ENCOUNTER — OFFICE VISIT (OUTPATIENT)
Dept: NEUROLOGY | Facility: CLINIC | Age: 69
End: 2024-05-28
Payer: COMMERCIAL

## 2024-05-28 ENCOUNTER — LAB (OUTPATIENT)
Dept: LAB | Facility: CLINIC | Age: 69
End: 2024-05-28
Payer: COMMERCIAL

## 2024-05-28 VITALS
SYSTOLIC BLOOD PRESSURE: 112 MMHG | BODY MASS INDEX: 41.85 KG/M2 | HEART RATE: 77 BPM | WEIGHT: 309 LBS | DIASTOLIC BLOOD PRESSURE: 76 MMHG | OXYGEN SATURATION: 97 % | HEIGHT: 72 IN

## 2024-05-28 DIAGNOSIS — Z95.2 AORTIC VALVE PROSTHESIS PRESENT: ICD-10-CM

## 2024-05-28 DIAGNOSIS — I63.10 CEREBROVASCULAR ACCIDENT (CVA) DUE TO EMBOLISM OF PRECEREBRAL ARTERY (H): Primary | ICD-10-CM

## 2024-05-28 DIAGNOSIS — I48.0 PAF (PAROXYSMAL ATRIAL FIBRILLATION) (H): Primary | ICD-10-CM

## 2024-05-28 DIAGNOSIS — I48.0 PAF (PAROXYSMAL ATRIAL FIBRILLATION) (H): ICD-10-CM

## 2024-05-28 LAB — INR BLD: 4.9 (ref 0.9–1.1)

## 2024-05-28 PROCEDURE — 36416 COLLJ CAPILLARY BLOOD SPEC: CPT

## 2024-05-28 PROCEDURE — 85610 PROTHROMBIN TIME: CPT

## 2024-05-28 PROCEDURE — 99214 OFFICE O/P EST MOD 30 MIN: CPT | Performed by: PHYSICIAN ASSISTANT

## 2024-05-28 NOTE — LETTER
5/28/2024         RE: Todd S Aschoff  [address]  [place] [address]        Dear Colleague,    Thank you for referring your patient, Todd S Aschoff, to the [facility]. Please see a copy of my visit note below.    __________________________________________________________      [facility]   [phone]  __________________________________________________________         History of Present Illness   Chief Complaint: Patient presents with:  Stroke      Todd S Aschoff is a 68 year old male presenting for follow-up for stroke.     He was hospitalized at [facility] on 3/18/2024. Prior to the hospital stay, he had a past medical history of atrial fibrillation on warfarin, mechanical aortic valve, thoracic aortic dissection, hyperlipidemia, depression, history of stroke.      He presented to the hospital with facial numbness.  It then spread to the left side of his trunk.  He had no symptoms in the extremities.  He also had a headache.  On presentation his INR was 2.22 where with his valve it should have been 2.5-3.5..     He was kept on his home warfarin with a new recommendation of his INR to stay 2.8-3.5.  He was kept on aspirin until his INR was within the new goal. Since being discharged, he is doing well, just finished PT last week-- last things working on him were mostly related to balance, core strength and leg strength.  Regarding the numbness from hist stroke, it is now really just around the left ear, and he also thinks his L ear hearing is worse since then.     He insists to me that he had had 5 more ER stays than what Epic reflects. We reviewed all encounters.  He states he's had 8 strokes this year. He insists they were all at [facility]. He cannot tell me any of the symptoms he experienced for those 5 missing strokes.    He doesn't think he has been ever treated for hypertension.  He is on lisinopril and not sure why.       Thinks his memory is ok---- he is on donepezil, not sure how long. No concerns with memory.    He does the bills, he drives and does not get lost.   He reports he knows all appointments, sets his calendar for them,      It was recommended that he be tested for sleep apnea but he doesn't think he needs to go because of what wife says about his snoring being stable.       Stroke Evaluation Summarized:  New results resulted and reviewed by me today are in BOLD below.  I personally reviewed the following neuroimaging studies today and the comments above reflect my own personal interpretation of the images: images: MRI brain    Stroke Evaluation Summarized  MRI/Head CT MRI Brain: Small recent ischemic infarcts of the lateral right cerebellum, bilateral cortical parietal lobe     CTH: No evidence of hemorrhage or acute intracranial pathology. Chronic infarcts of the bilateral cerebellar hemispheres.    Intracranial Vasculature CTA Head: No proximal large vessel occlusion. Moderate focal stenosis in the P3 segment of the right PCA.    Cervical Vasculature CTA Neck: No LVO, significant stenosis or dissection.       Echocardiogram EF 60 to 65%, mechanical aortic valve present, gradient normal, normal left atrial size.   EKG/Telemetry Sinus rhythm with Premature atrial complexes   Left axis deviation    Other Testing INR: 2.22 -> 2.30 -> 2.41        Labs Lab Results   Component Value Date    LDL 75 03/19/2024    A1C 5.7 (H) 03/19/2024    CTROPT 13 03/18/2024    INR 2.3 (H) 05/20/2024    INR 3.1 (H) 05/08/2024               Home Medications     Current Outpatient Medications   Medication Sig Dispense Refill     acetaminophen (TYLENOL) 500 MG tablet Take 1,000 mg by mouth 2 times daily       atorvastatin (LIPITOR) 40 MG tablet Take 1 tablet (40 mg) by mouth every evening 90 tablet 3     azelastine (ASTELIN) 0.1 % nasal spray Spray 1 spray into both nostrils daily       cyclobenzaprine (FLEXERIL) 10 MG tablet Take 1 tablet  (10 mg) by mouth 3 times daily as needed for muscle spasms 270 tablet 3     diazepam (VALIUM) 5 MG tablet Take 1 tablet by mouth nightly as needed for anxiety 30 tablet 2     donepezil (ARICEPT) 10 MG tablet Take 1 tablet (10 mg) by mouth At Bedtime 90 tablet 3     folic acid (FOLVITE) 400 MCG tablet Take 400 mcg by mouth daily       guanFACINE (TENEX) 1 MG tablet Take 1 tablet (1 mg) by mouth At Bedtime 90 tablet 1     levothyroxine (SYNTHROID/LEVOTHROID) 175 MCG tablet Take 1 tablet (175 mcg) by mouth daily 90 tablet 3     lisinopril (ZESTRIL) 10 MG tablet Take 1 tablet (10 mg) by mouth daily 90 tablet 3     mirabegron (MYRBETRIQ) 50 MG 24 hr tablet Take 1 tablet (50 mg) by mouth daily 90 tablet 3     pregabalin (LYRICA) 100 MG capsule Take 1 capsule (100 mg) by mouth 3 times daily Do not take if sleepy/sedated. 270 capsule 3     propafenone (RYTHMOL) 150 MG TABS tablet Take 1 tablet (150 mg) by mouth every 8 hours 270 tablet 4     venlafaxine (EFFEXOR XR) 75 MG 24 hr capsule Take 3 capsules (225 mg) by mouth daily 270 capsule 3     warfarin ANTICOAGULANT (COUMADIN) 5 MG tablet Take 1.5 tablets (7.5 mg) by mouth every Tue, Thurs and Sat; Take 1 tablet (5 mg) by mouth all other days of the week Or as directed.       No current facility-administered medications for this visit.            Physical Examination     Physical Exam    Estimated body mass index is 41.91 kg/m  as calculated from the following:    Height as of this encounter: 1.829 m (6').    Weight as of this encounter: 140.2 kg (309 lb).    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, otherwise EOMs are intact, facial sensation intact and symmetric,  facial movements symmetric, hearing not formally tested but intact to conversation, palate elevation symmetric and uvula midline, no dysarthria, shoulder shrug strong bilaterally, tongue protrusion midline  Motor:  normal muscle tone and bulk, no abnormal movements, able to move all limbs spontaneously, strength 5/5 throughout upper and lower extremities, no pronator drift  Reflexes:   deferred  Sensory:  light touch sensation intact and symmetric throughout upper and lower extremities, no extinction on double simultaneous stimulation   Coordination:  normal finger-to-nose and heel-to-shin bilaterally without dysmetria, rapid alternating movements symmetric  Station/Gait:  deferred           Screenings and Questionnaires:     Tobacco:    Tobacco Use      Smoking status: Never        Passive exposure: Past      Smokeless tobacco: Never      Sleep Apnea:      5/28/2024    11:09 AM   STOP-Bang Questionnaire   Do you SNORE loudly (louder than talking or loud enough to be heard through closed doors)? 0   Do you often feel TIRED, fatigued, or sleepy during daytime? 0   Has anyone OBSERVED you stop breathing during your sleep? 0   Do you have or are you being treated for high blood PRESSURE? 1   BMI more than 35kg/m2? 1   AGE over 50 years old? 1   NECK circumference > 16 inches (40cm)? 0   GENDER: Male? 1   Total Score 4       Depression:      1/12/2022     2:22 PM 12/7/2021     3:32 PM   PHQ-2 ( 1999 [employer])   Q1: Little interest or pleasure in doing things 0 0   Q2: Feeling down, depressed or hopeless 0 0   PHQ-2 Score 0 0   Q1: Little interest or pleasure in doing things  Not at all   Q2: Feeling down, depressed or hopeless  Not at all   PHQ-2 Score  0       Stroke Recovery and Risk Factors:      5/28/2024    10:59 AM   Stroke Questionnaire   Residual effects: Any residual effects from stroke? I have some symptoms, but I'm not sure if they're residual effects of the stroke   Residual effects: Describe numbness left ear  and behind he ear   Level of independence: Could you live alone? Yes   Quality of Life: What work now or before stroke Retired   Quality of Life: Current work status Retired   Quality of Life: How do you get around Drive myself   Quality of Life: Living situation before stroke With family or significant other   Quality of Life: Living situation now With family or significant other   Medication: How do you take your meds Myself, from individual bottles   Medication: Do you ever miss or forget meds Rarely   Risk Factor: Checking blood pressure at home No, I don't have a blood pressure cuff   Risk Factor: Checking blood sugar at home No   Risk Factor: Second-hand smoke at home No   Risk Factor: How much caffeine per day i avoid cafeen   Who completed this questionnaire? The patient independently             Assessment and Plan       1.  Acute ischemic stroke of the right lateral cerebellum and bilateral cortical parietal lobes due to cardioembolism in the setting of known atrial fibrillation and mechanical valve with subtherapeutic INR  - Although his INR was low on presentation, untreated sleep apnea could also be an independent risk factor for stroke. He is agreeable to consulatation with them  - continues to have labile INRs out side of the goal. Keep trying to keep INR 2.8-3.5.  He does lovenox bridging for surgeries/procedures (he is not sure who coordinates it but it's happened before, he thinks maybe PCP arranges that)  - BP control good today, continue lisinopril  - LDL quite good, continue lipitor    2. Mechanical valve  - follow up with cardiology  9/2024    3. L ear hearing loss   - I encouraged him to see his ENT again and gave him the # to call to schedule again. They wanted to see him back again anyway    4. Memory loss, listed as a problem in his chart since 2015  - Pretty significant today without insight into it. Mentions 5 other hospital stays which are nowhere in [facility].  - Donepezil    - Has followed with Dr. Dejesus from [facility] in the past; refer back there if need be    - Return in about 6 months (around 11/28/2024) for with ALAINA Urbina or other stroke THOR, patient prefers in-person.    Stroke Education provided.  He will call us with any questions.  For any acute neurologic deficits he was advised to  go directly to the hospital rather than call the clinic.      Arlene Urbina PA-C  Neurology  05/28/2024         ____________________________________________________________________    Billing:    I spent a total of 35 minutes on the day of the visit.   Time spent by me doing chart review, history and exam, documentation and further activities per the note          Again, thank you for allowing me to participate in the care of your patient.        Sincerely,        Arlene Urbina PA-C

## 2024-05-28 NOTE — PROGRESS NOTES
ANTICOAGULATION MANAGEMENT     Todd S Aschoff 68 year old male is on warfarin with supratherapeutic INR result. (Goal INR Other - see comment)    Recent labs: (last 7 days)     05/28/24  1200   INR 4.9*       ASSESSMENT     Source(s): Chart Review  Previous INR was Subtherapeutic  Medication or health changes since last INR chart reviewed; none identified  It was noted at last INR check that patient had cut back on his green veggie intake to try to get INR to come up. Was previously eating greens about once a week.  Will need to verify if he has had any green veggies this week.         PLAN     Unable to reach Nicko today.    Left message to take reduced dose of warfarin, 5 mg tonight. Request call back for assessment.    Consulted Jazmine Felipe [facility] and she recommended a smaller warfarin maintenance dose adjustment today of 5 mg Tu, Fri and 7.5 mg all other days (~5% decrease) since patient is sensitive to changes in his warfarin dosing.    Follow up required to confirm warfarin dose taken and assess for changes    Amanda Wheeler RN  Anticoagulation Clinic  5/28/2024

## 2024-05-28 NOTE — NURSING NOTE
Symptoms or concerns today: none    Med comments: atorvastatin instead of simvasatin     Who the patient is here with today: none    Raquel Costello, CMA

## 2024-05-28 NOTE — PROGRESS NOTES
__________________________________________________________      [facility] Miguelina Lynch   [phone]  __________________________________________________________         History of Present Illness   Chief Complaint: Patient presents with:  Stroke      Todd S Aschoff is a 68 year old male presenting for follow-up for stroke.     He was hospitalized at [facility] on 3/18/2024. Prior to the hospital stay, he had a past medical history of atrial fibrillation on warfarin, mechanical aortic valve, thoracic aortic dissection, hyperlipidemia, depression, history of stroke.      He presented to the hospital with facial numbness.  It then spread to the left side of his trunk.  He had no symptoms in the extremities.  He also had a headache.  On presentation his INR was 2.22 where with his valve it should have been 2.5-3.5..     He was kept on his home warfarin with a new recommendation of his INR to stay 2.8-3.5.  He was kept on aspirin until his INR was within the new goal. Since being discharged, he is doing well, just finished PT last week-- last things working on him were mostly related to balance, core strength and leg strength.  Regarding the numbness from hist stroke, it is now really just around the left ear, and he also thinks his L ear hearing is worse since then.     He insists to me that he had had 5 more ER stays than what Epic reflects. We reviewed all encounters.  He states he's had 8 strokes this year. He insists they were all at [facility]. He cannot tell me any of the symptoms he experienced for those 5 missing strokes.    He doesn't think he has been ever treated for hypertension.  He is on lisinopril and not sure why.      Thinks his memory is ok---- he is on donepezil, not sure how long. No concerns with memory.    He does the bills, he drives and does not get lost.   He reports he knows all appointments, sets his calendar for them,      It was recommended that he be tested  for sleep apnea but he doesn't think he needs to go because of what wife says about his snoring being stable.       Stroke Evaluation Summarized:  New results resulted and reviewed by me today are in BOLD below.  I personally reviewed the following neuroimaging studies today and the comments above reflect my own personal interpretation of the images: images: MRI brain    Stroke Evaluation Summarized  MRI/Head CT MRI Brain: Small recent ischemic infarcts of the lateral right cerebellum, bilateral cortical parietal lobe     CTH: No evidence of hemorrhage or acute intracranial pathology. Chronic infarcts of the bilateral cerebellar hemispheres.    Intracranial Vasculature CTA Head: No proximal large vessel occlusion. Moderate focal stenosis in the P3 segment of the right PCA.    Cervical Vasculature CTA Neck: No LVO, significant stenosis or dissection.       Echocardiogram EF 60 to 65%, mechanical aortic valve present, gradient normal, normal left atrial size.   EKG/Telemetry Sinus rhythm with Premature atrial complexes   Left axis deviation    Other Testing INR: 2.22 -> 2.30 -> 2.41        Labs Lab Results   Component Value Date    LDL 75 03/19/2024    A1C 5.7 (H) 03/19/2024    CTROPT 13 03/18/2024    INR 2.3 (H) 05/20/2024    INR 3.1 (H) 05/08/2024               Home Medications     Current Outpatient Medications   Medication Sig Dispense Refill    acetaminophen (TYLENOL) 500 MG tablet Take 1,000 mg by mouth 2 times daily      atorvastatin (LIPITOR) 40 MG tablet Take 1 tablet (40 mg) by mouth every evening 90 tablet 3    azelastine (ASTELIN) 0.1 % nasal spray Spray 1 spray into both nostrils daily      cyclobenzaprine (FLEXERIL) 10 MG tablet Take 1 tablet (10 mg) by mouth 3 times daily as needed for muscle spasms 270 tablet 3    diazepam (VALIUM) 5 MG tablet Take 1 tablet by mouth nightly as needed for anxiety 30 tablet 2    donepezil (ARICEPT) 10 MG tablet Take 1 tablet (10 mg) by mouth At Bedtime 90 tablet 3     folic acid (FOLVITE) 400 MCG tablet Take 400 mcg by mouth daily      guanFACINE (TENEX) 1 MG tablet Take 1 tablet (1 mg) by mouth At Bedtime 90 tablet 1    levothyroxine (SYNTHROID/LEVOTHROID) 175 MCG tablet Take 1 tablet (175 mcg) by mouth daily 90 tablet 3    lisinopril (ZESTRIL) 10 MG tablet Take 1 tablet (10 mg) by mouth daily 90 tablet 3    mirabegron (MYRBETRIQ) 50 MG 24 hr tablet Take 1 tablet (50 mg) by mouth daily 90 tablet 3    pregabalin (LYRICA) 100 MG capsule Take 1 capsule (100 mg) by mouth 3 times daily Do not take if sleepy/sedated. 270 capsule 3    propafenone (RYTHMOL) 150 MG TABS tablet Take 1 tablet (150 mg) by mouth every 8 hours 270 tablet 4    venlafaxine (EFFEXOR XR) 75 MG 24 hr capsule Take 3 capsules (225 mg) by mouth daily 270 capsule 3    warfarin ANTICOAGULANT (COUMADIN) 5 MG tablet Take 1.5 tablets (7.5 mg) by mouth every Tue, Thurs and Sat; Take 1 tablet (5 mg) by mouth all other days of the week Or as directed.       No current facility-administered medications for this visit.            Physical Examination     Physical Exam    Estimated body mass index is 41.91 kg/m  as calculated from the following:    Height as of this encounter: 1.829 m (6').    Weight as of this encounter: 140.2 kg (309 lb).    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, otherwise EOMs are intact, facial sensation intact and symmetric, facial movements symmetric, hearing not formally tested but intact to conversation, palate elevation symmetric and uvula midline, no dysarthria, shoulder shrug strong bilaterally, tongue protrusion midline  Motor:  normal muscle tone and bulk, no abnormal movements, able to  move all limbs spontaneously, strength 5/5 throughout upper and lower extremities, no pronator drift  Reflexes:   deferred  Sensory:  light touch sensation intact and symmetric throughout upper and lower extremities, no extinction on double simultaneous stimulation   Coordination:  normal finger-to-nose and heel-to-shin bilaterally without dysmetria, rapid alternating movements symmetric  Station/Gait:  deferred           Screenings and Questionnaires:     Tobacco:    Tobacco Use      Smoking status: Never        Passive exposure: Past      Smokeless tobacco: Never      Sleep Apnea:      5/28/2024    11:09 AM   STOP-Bang Questionnaire   Do you SNORE loudly (louder than talking or loud enough to be heard through closed doors)? 0   Do you often feel TIRED, fatigued, or sleepy during daytime? 0   Has anyone OBSERVED you stop breathing during your sleep? 0   Do you have or are you being treated for high blood PRESSURE? 1   BMI more than 35kg/m2? 1   AGE over 50 years old? 1   NECK circumference > 16 inches (40cm)? 0   GENDER: Male? 1   Total Score 4       Depression:      1/12/2022     2:22 PM 12/7/2021     3:32 PM   PHQ-2 ( 1999 [employer])   Q1: Little interest or pleasure in doing things 0 0   Q2: Feeling down, depressed or hopeless 0 0   PHQ-2 Score 0 0   Q1: Little interest or pleasure in doing things  Not at all   Q2: Feeling down, depressed or hopeless  Not at all   PHQ-2 Score  0       Stroke Recovery and Risk Factors:      5/28/2024    10:59 AM   Stroke Questionnaire   Residual effects: Any residual effects from stroke? I have some symptoms, but I'm not sure if they're residual effects of the stroke   Residual effects: Describe numbness left ear and behind he ear   Level of independence: Could you live alone? Yes   Quality of Life: What work now or before stroke Retired   Quality of Life: Current work status Retired   Quality of Life: How do you get around Drive myself   Quality of Life: Living situation before  stroke With family or significant other   Quality of Life: Living situation now With family or significant other   Medication: How do you take your meds Myself, from individual bottles   Medication: Do you ever miss or forget meds Rarely   Risk Factor: Checking blood pressure at home No, I don't have a blood pressure cuff   Risk Factor: Checking blood sugar at home No   Risk Factor: Second-hand smoke at home No   Risk Factor: How much caffeine per day i avoid cafeen   Who completed this questionnaire? The patient independently             Assessment and Plan       1.  Acute ischemic stroke of the right lateral cerebellum and bilateral cortical parietal lobes due to cardioembolism in the setting of known atrial fibrillation and mechanical valve with subtherapeutic INR  - Although his INR was low on presentation, untreated sleep apnea could also be an independent risk factor for stroke. He is agreeable to consulatation with them  - continues to have labile INRs out side of the goal. Keep trying to keep INR 2.8-3.5.  He does lovenox bridging for surgeries/procedures (he is not sure who coordinates it but it's happened before, he thinks maybe PCP arranges that)  - BP control good today, continue lisinopril  - LDL quite good, continue lipitor    2. Mechanical valve  - follow up with cardiology  9/2024    3. L ear hearing loss   - I encouraged him to see his ENT again and gave him the # to call to schedule again. They wanted to see him back again anyway    4. Memory loss, listed as a problem in his chart since 2015  - Pretty significant today without insight into it. Mentions 5 other hospital stays which are nowhere in [facility] or [facility]where.  - Donepezil   - Has followed with Dr. Dejesus from [facility] in the past; refer back there if need be    - Return in about 6 months (around 11/28/2024) for with ALAINA Urbina or other stroke THOR, patient prefers in-person.    Stroke Education provided.  He will call us with any questions.  For any  acute neurologic deficits he was advised to  go directly to the hospital rather than call the clinic.      Arlene Urbina PA-C  Neurology  05/28/2024         ____________________________________________________________________    Billing:    I spent a total of 35 minutes on the day of the visit.   Time spent by me doing chart review, history and exam, documentation and further activities per the note

## 2024-05-28 NOTE — PATIENT INSTRUCTIONS
- Sleep apnea testing    - Reschedule with ENT for the hearing loss [phone]     - Don't stop your warfarin (such as stopping it for dental work, surgery, etc) without checking with our office first    - Call 911 with any new stroke symptoms

## 2024-05-29 NOTE — PROGRESS NOTES
ANTICOAGULATION MANAGEMENT     Todd S Aschoff 68 year old male is on warfarin with supratherapeutic INR result. (Goal INR Other - see comment)    Recent labs: (last 7 days)     05/28/24  1200   INR 4.9*       ASSESSMENT     Source(s): Chart Review and Patient/Caregiver Call     Warfarin doses taken: More warfarin taken than planned which may be affecting INR.  Took boost dose on 5/20 as instructed, but then took warfarin 7.5 mg daily.  Also did not receive message to reduce dose yesterday so took 7.5 mg instead of 5 mg.  Diet: No new diet changes identified  Medication/supplement changes: None noted  New illness, injury, or hospitalization: No  Signs or symptoms of bleeding or clotting: No  Previous result: Subtherapeutic  Additional findings: increased activity.  Doing double the exercises that they provided to try to stay active. Had some shoulder pain, but this is improving. He plans to continue his exercise regimen.         PLAN     Recommended plan for no diet, medication or health factor changes affecting INR     Dosing Instructions: decrease your warfarin dose (5% change) with next INR in 1 week       Summary  As of 5/28/2024      Full warfarin instructions:  5/28: 7.5 mg; Otherwise 5 mg every Wed, Sat; 7.5 mg all other days   Next INR check:  6/4/2024               Telephone call with Nicko who agrees to plan and repeated back plan correctly    Lab visit scheduled    Education provided:   Please call back if any changes to your diet, medications or how you've been taking warfarin    Plan made with [facility] Pharmacist Jazmine Wheeler, RN  Anticoagulation Clinic  5/29/2024    _______________________________________________________________________     Anticoagulation Episode Summary       Current INR goal:  Other - see comment   TTR:  66.9% (11.8 mo)   Target end date:  Indefinite   Send INR reminders to:  [facility]    Indications    PAF (paroxysmal atrial fibrillation) (H) [I48.0]  Mechanical AV  Replacement 1992 -- on Warfarin  [Z95.2]             Comments:  3/20/24-goal 2.8-3.5             Anticoagulation Care Providers       Provider Role Specialty Phone number    Obdulio Ingram MD Referring Internal Medicine [phone]    Herve Morgan MD Referring Internal Medicine - Pediatrics [phone]

## 2024-06-05 ENCOUNTER — TELEPHONE (OUTPATIENT)
Dept: ANTICOAGULATION | Facility: CLINIC | Age: 69
End: 2024-06-05
Payer: COMMERCIAL

## 2024-06-12 ENCOUNTER — TELEPHONE (OUTPATIENT)
Dept: ANTICOAGULATION | Facility: CLINIC | Age: 69
End: 2024-06-12
Payer: COMMERCIAL

## 2024-06-14 ENCOUNTER — LAB (OUTPATIENT)
Dept: LAB | Facility: CLINIC | Age: 69
End: 2024-06-14
Payer: COMMERCIAL

## 2024-06-14 ENCOUNTER — ANTICOAGULATION THERAPY VISIT (OUTPATIENT)
Dept: ANTICOAGULATION | Facility: CLINIC | Age: 69
End: 2024-06-14

## 2024-06-14 DIAGNOSIS — Z95.2 AORTIC VALVE PROSTHESIS PRESENT: ICD-10-CM

## 2024-06-14 DIAGNOSIS — I48.0 PAF (PAROXYSMAL ATRIAL FIBRILLATION) (H): ICD-10-CM

## 2024-06-14 DIAGNOSIS — I48.0 PAF (PAROXYSMAL ATRIAL FIBRILLATION) (H): Primary | ICD-10-CM

## 2024-06-14 LAB — INR BLD: 3.4 (ref 0.9–1.1)

## 2024-06-14 PROCEDURE — 85610 PROTHROMBIN TIME: CPT

## 2024-06-14 PROCEDURE — 36416 COLLJ CAPILLARY BLOOD SPEC: CPT

## 2024-06-29 DIAGNOSIS — Z95.2 AORTIC VALVE PROSTHESIS PRESENT: ICD-10-CM

## 2024-06-29 DIAGNOSIS — I63.9 CEREBROVASCULAR ACCIDENT (CVA), UNSPECIFIED MECHANISM (H): ICD-10-CM

## 2024-06-29 DIAGNOSIS — I48.0 PAF (PAROXYSMAL ATRIAL FIBRILLATION) (H): ICD-10-CM

## 2024-07-01 RX ORDER — WARFARIN SODIUM 5 MG/1
TABLET ORAL
Qty: 120 TABLET | Refills: 0 | Status: SHIPPED | OUTPATIENT
Start: 2024-07-01

## 2024-07-01 NOTE — TELEPHONE ENCOUNTER
ANTICOAGULATION MANAGEMENT:  Medication Refill    Anticoagulation Summary  As of 6/14/2024      Warfarin maintenance plan:  5 mg (5 mg x 1) every Wed, Sat; 7.5 mg (5 mg x 1.5) all other days   Next INR check:  7/5/2024   Target end date:  Indefinite    Indications    PAF (paroxysmal atrial fibrillation) (H) [I48.0]  Mechanical AV Replacement 1992 -- on Warfarin  [Z95.2]                 Anticoagulation Care Providers       Provider Role Specialty Phone number    Obdulio Ingram MD Referring Internal Medicine [phone]    Herve Morgan MD Referring Internal Medicine - Pediatrics [phone]            Refill Criteria    Visit with referring provider/group: Meets criteria: office visit within referring provider group in the last 1 year on 04/25/24    [facility] referral last signed: 03/21/2024; within last year: Yes    Lab monitoring not exceeding 2 weeks overdue: Yes    Nicko meets all criteria for refill. Rx instructions and quantity supplied updated to match patient's current dosing plan.  90 day supply with 0 refills granted per [facility] protocol     Angely Smalls RN  Anticoagulation Clinic       Writer left a voicemail for patient to return call regarding  results and the provider's recommendations. Urgent Care hours and phone number were provided for patient to return our call.

## 2024-07-05 ENCOUNTER — LAB (OUTPATIENT)
Dept: LAB | Facility: CLINIC | Age: 69
End: 2024-07-05
Payer: COMMERCIAL

## 2024-07-05 ENCOUNTER — ANTICOAGULATION THERAPY VISIT (OUTPATIENT)
Dept: ANTICOAGULATION | Facility: CLINIC | Age: 69
End: 2024-07-05

## 2024-07-05 DIAGNOSIS — Z95.2 AORTIC VALVE PROSTHESIS PRESENT: ICD-10-CM

## 2024-07-05 DIAGNOSIS — I48.0 PAF (PAROXYSMAL ATRIAL FIBRILLATION) (H): ICD-10-CM

## 2024-07-05 DIAGNOSIS — I48.0 PAF (PAROXYSMAL ATRIAL FIBRILLATION) (H): Primary | ICD-10-CM

## 2024-07-05 LAB — INR BLD: 3.9 (ref 0.9–1.1)

## 2024-07-05 PROCEDURE — 36416 COLLJ CAPILLARY BLOOD SPEC: CPT

## 2024-07-05 PROCEDURE — 85610 PROTHROMBIN TIME: CPT

## 2024-07-05 NOTE — PROGRESS NOTES
ANTICOAGULATION MANAGEMENT     Todd S Aschoff 68 year old male is on warfarin with supratherapeutic INR result. (Goal INR Other - see comment - 2.8 - 3.5)    Recent labs: (last 7 days)     07/05/24  1115   INR 3.9*       ASSESSMENT     Source(s): Chart Review and Patient/Caregiver Call     Warfarin doses taken: Warfarin taken as instructed  Diet: Decreased greens/vitamin K in diet; plans to resume previous intake   Reported eating less green in the last week.  Medication/supplement changes: None noted  New illness, injury, or hospitalization: No  Signs or symptoms of bleeding or clotting: No  Previous result: Therapeutic last visit at 3.4; previously outside of goal range at 4.9  Additional findings: None       PLAN     Recommended plan for temporary change(s) affecting INR     Dosing Instructions: partial hold then continue your current warfarin dose with next INR in 2 weeks       Summary  As of 7/5/2024      Full warfarin instructions:  7/5: 5 mg; Otherwise 5 mg every Wed, Sat; 7.5 mg all other days   Next INR check:  7/19/2024               Telephone call with Nicko who verbalizes understanding and agrees to plan    Lab visit scheduled - INR on 7/19./24 @ [facility].    Education provided: Taking warfarin: Importance of taking warfarin as instructed  Goal range and lab monitoring: goal range and significance of current result  Dietary considerations: importance of consistent vitamin K intake and impact of vitamin K foods on INR    Plan made with [facility] Pharmacist Luz Maria Timmons, RN  Anticoagulation Clinic  7/5/2024    _______________________________________________________________________     Anticoagulation Episode Summary       Current INR goal:  Other - see comment   TTR:  60.8% (11.9 mo)   Target end date:  Indefinite   Send INR reminders to:  [facility]ERR[facility]    Indications    PAF (paroxysmal atrial fibrillation) (H) [I48.0]  Mechanical AV Replacement 1992 -- on Warfarin  [Z95.2]              Comments:  3/20/24-goal 2.8-3.5             Anticoagulation Care Providers       Provider Role Specialty Phone number    Obdulio Ingram MD Referring Internal Medicine [phone]    Herve Morgan MD Referring Internal Medicine - Pediatrics [phone]

## 2024-07-19 ENCOUNTER — LAB (OUTPATIENT)
Dept: LAB | Facility: CLINIC | Age: 69
End: 2024-07-19
Payer: COMMERCIAL

## 2024-07-19 ENCOUNTER — ANTICOAGULATION THERAPY VISIT (OUTPATIENT)
Dept: ANTICOAGULATION | Facility: CLINIC | Age: 69
End: 2024-07-19

## 2024-07-19 DIAGNOSIS — Z95.2 AORTIC VALVE PROSTHESIS PRESENT: ICD-10-CM

## 2024-07-19 DIAGNOSIS — I48.0 PAF (PAROXYSMAL ATRIAL FIBRILLATION) (H): ICD-10-CM

## 2024-07-19 DIAGNOSIS — I48.0 PAF (PAROXYSMAL ATRIAL FIBRILLATION) (H): Primary | ICD-10-CM

## 2024-07-19 LAB — INR BLD: 4.9 (ref 0.9–1.1)

## 2024-07-19 PROCEDURE — 85610 PROTHROMBIN TIME: CPT

## 2024-07-19 PROCEDURE — 36416 COLLJ CAPILLARY BLOOD SPEC: CPT

## 2024-07-19 NOTE — PROGRESS NOTES
ANTICOAGULATION MANAGEMENT     Todd S Aschoff 68 year old male is on warfarin with supratherapeutic INR result. (Goal INR Other - see comment)    Recent labs: (last 7 days)     07/19/24  1112   INR 4.9*       ASSESSMENT     Source(s): Chart Review and Patient/Caregiver Call     Warfarin doses taken: Warfarin taken as instructed  Diet: No new diet changes identified  Medication/supplement changes: None noted  New illness, injury, or hospitalization: No  Signs or symptoms of bleeding or clotting: Yes: bruise on his right thigh after being hit by a baseball yesterday.  Previous result: Supratherapeutic  Additional findings: None       PLAN     Recommended plan for no diet, medication or health factor changes affecting INR     Dosing Instructions: decrease your warfarin dose (5% change) with next INR in 1 week       Summary  As of 7/19/2024      Full warfarin instructions:  5 mg every Mon, Wed, Fri; 7.5 mg all other days   Next INR check:  7/26/2024               Telephone call with Nicko who agrees to plan and repeated back plan correctly    Lab visit scheduled    Education provided: Please call back if any changes to your diet, medications or how you've been taking warfarin  Goal range and lab monitoring: goal range and significance of current result  Symptom monitoring: monitoring for bleeding signs and symptoms    Plan made with [facility] Pharmacist Nolvai Wheeler RN  Anticoagulation Clinic  7/19/2024    _______________________________________________________________________     Anticoagulation Episode Summary       Current INR goal:  Other - see comment   TTR:  57.7% (11.9 mo)   Target end date:  Indefinite   Send INR reminders to:  AIDA GUERRERO    Indications    PAF (paroxysmal atrial fibrillation) (H) [I48.0]  Mechanical AV Replacement 1992 -- on Warfarin  [Z95.2]             Comments:  3/20/24-goal 2.8-3.5             Anticoagulation Care Providers       Provider Role Specialty Phone number     Obdulio Ingram MD Referring Internal Medicine [phone]    Herve Morgan MD Referring Internal Medicine - Pediatrics [phone]

## 2024-07-26 ENCOUNTER — ANTICOAGULATION THERAPY VISIT (OUTPATIENT)
Dept: ANTICOAGULATION | Facility: CLINIC | Age: 69
End: 2024-07-26

## 2024-07-26 ENCOUNTER — LAB (OUTPATIENT)
Dept: LAB | Facility: CLINIC | Age: 69
End: 2024-07-26
Payer: COMMERCIAL

## 2024-07-26 DIAGNOSIS — I48.0 PAF (PAROXYSMAL ATRIAL FIBRILLATION) (H): ICD-10-CM

## 2024-07-26 DIAGNOSIS — Z95.2 AORTIC VALVE PROSTHESIS PRESENT: ICD-10-CM

## 2024-07-26 DIAGNOSIS — I48.0 PAF (PAROXYSMAL ATRIAL FIBRILLATION) (H): Primary | ICD-10-CM

## 2024-07-26 LAB — INR BLD: 3.7 (ref 0.9–1.1)

## 2024-07-26 PROCEDURE — 85610 PROTHROMBIN TIME: CPT

## 2024-07-26 PROCEDURE — 36416 COLLJ CAPILLARY BLOOD SPEC: CPT

## 2024-07-26 NOTE — PROGRESS NOTES
ANTICOAGULATION MANAGEMENT     Todd S Aschoff 68 year old male is on warfarin with supratherapeutic INR result. (Goal INR Other - see comment)    Recent labs: (last 7 days)     07/26/24  1112   INR 3.7*       ASSESSMENT     Source(s): Chart Review and Patient/Caregiver Call     Warfarin doses taken: Warfarin taken as instructed - confirmed he has both 5 mg and 2.5 mg tablets  Diet: Decreased greens/vitamin K in diet; plans to resume previous intake - Nicko reports less greens this past week. He will attempt to resume previous intake starting tonight.   Medication/supplement changes: None noted  New illness, injury, or hospitalization: No  Signs or symptoms of bleeding or clotting: Yes: bruised on high thigh after his cat tripped him and he ran into the counter  Previous result: Supratherapeutic  Additional findings: None       PLAN     Recommended plan for temporary change(s) affecting INR     Dosing Instructions: Continue your current warfarin dose (and add greens back into diet) with next INR in 1 week       Summary  As of 7/26/2024      Full warfarin instructions:  5 mg every Mon, Wed, Fri; 7.5 mg all other days   Next INR check:  8/2/2024               Telephone call with Nicko who verbalizes understanding and agrees to plan    Lab visit scheduled    Education provided: Please call back if any changes to your diet, medications or how you've been taking warfarin    Plan made with [facility] Pharmacist Jazmine Andrew RN  Anticoagulation Clinic  7/26/2024    _______________________________________________________________________     Anticoagulation Episode Summary       Current INR goal:  Other - see comment   TTR:  55.7% (11.9 mo)   Target end date:  Indefinite   Send INR reminders to:  AIDA GUERRERO    Indications    PAF (paroxysmal atrial fibrillation) (H) [I48.0]  Mechanical AV Replacement 1992 -- on Warfarin  [Z95.2]             Comments:  3/20/24-goal 2.8-3.5             Anticoagulation  Care Providers       Provider Role Specialty Phone number    Obdulio Ingram MD Referring Internal Medicine [phone]    Herve Morgan MD Referring Internal Medicine - Pediatrics [phone]

## 2024-08-01 ENCOUNTER — OFFICE VISIT (OUTPATIENT)
Dept: URGENT CARE | Facility: URGENT CARE | Age: 69
End: 2024-08-01
Payer: COMMERCIAL

## 2024-08-01 ENCOUNTER — ANCILLARY PROCEDURE (OUTPATIENT)
Dept: GENERAL RADIOLOGY | Facility: CLINIC | Age: 69
End: 2024-08-01
Attending: PHYSICIAN ASSISTANT
Payer: COMMERCIAL

## 2024-08-01 ENCOUNTER — LAB (OUTPATIENT)
Dept: LAB | Facility: CLINIC | Age: 69
End: 2024-08-01
Payer: COMMERCIAL

## 2024-08-01 ENCOUNTER — ANTICOAGULATION THERAPY VISIT (OUTPATIENT)
Dept: ANTICOAGULATION | Facility: CLINIC | Age: 69
End: 2024-08-01

## 2024-08-01 VITALS
WEIGHT: 315 LBS | SYSTOLIC BLOOD PRESSURE: 135 MMHG | DIASTOLIC BLOOD PRESSURE: 91 MMHG | TEMPERATURE: 98.3 F | BODY MASS INDEX: 43.1 KG/M2 | OXYGEN SATURATION: 97 % | HEART RATE: 81 BPM

## 2024-08-01 DIAGNOSIS — M25.521 ELBOW PAIN, RIGHT: ICD-10-CM

## 2024-08-01 DIAGNOSIS — Z95.2 AORTIC VALVE PROSTHESIS PRESENT: ICD-10-CM

## 2024-08-01 DIAGNOSIS — M79.89 ARM SWELLING: ICD-10-CM

## 2024-08-01 DIAGNOSIS — I48.0 PAF (PAROXYSMAL ATRIAL FIBRILLATION) (H): ICD-10-CM

## 2024-08-01 DIAGNOSIS — M25.521 ELBOW PAIN, RIGHT: Primary | ICD-10-CM

## 2024-08-01 DIAGNOSIS — S40.021A SUPERFICIAL BRUISING OF ARM, RIGHT, INITIAL ENCOUNTER: ICD-10-CM

## 2024-08-01 DIAGNOSIS — I48.0 PAF (PAROXYSMAL ATRIAL FIBRILLATION) (H): Primary | ICD-10-CM

## 2024-08-01 DIAGNOSIS — T14.8XXA HEMATOMA OF SKIN: ICD-10-CM

## 2024-08-01 LAB — INR BLD: 4.7 (ref 0.9–1.1)

## 2024-08-01 PROCEDURE — 85610 PROTHROMBIN TIME: CPT

## 2024-08-01 PROCEDURE — 99214 OFFICE O/P EST MOD 30 MIN: CPT | Performed by: PHYSICIAN ASSISTANT

## 2024-08-01 PROCEDURE — 36416 COLLJ CAPILLARY BLOOD SPEC: CPT

## 2024-08-01 PROCEDURE — 73080 X-RAY EXAM OF ELBOW: CPT | Mod: TC | Performed by: RADIOLOGY

## 2024-08-01 ASSESSMENT — PAIN SCALES - GENERAL: PAINLEVEL: EXTREME PAIN (8)

## 2024-08-01 NOTE — PROGRESS NOTES
Encounter routed to PCP as an FYI since INR was critical with an INR result of  4.7  on this patient and patient has bruising. INR was managed with consults with the [facility]agulation [facility] pharmacists.      After original warfarin dosing plan adjustment, spoke with patient and he reported bruising to forearm with pain. Consulted [facility] again and warfarin dose held for 8/1/24. Patient was also advised to go to Urgent Care to have his arm looked at.     Lucina Andrew RN, BSN  Anticoagulation Clinic

## 2024-08-01 NOTE — PROGRESS NOTES
ANTICOAGULATION MANAGEMENT     Todd S Aschoff 68 year old male is on warfarin with supratherapeutic INR result. (Goal INR Other - see comment)    Recent labs: (last 7 days)     08/01/24  1452   INR 4.7*       ASSESSMENT     Source(s): Chart Review and Patient/Caregiver Call     Warfarin doses taken: Warfarin taken as instructed  Diet: No new diet changes identified Patient reports he has resumed his usual amount of green veggies since INR check on 7/26/24  Medication/supplement changes: None noted  New illness, injury, or hospitalization: No  Signs or symptoms of bleeding or clotting: Yes: Patient reports his arm hurt when he woke up, has since developed a bruise from his elbow to his wrist, does not think bruise is getting any larger, patient reports pain if he pushes on the bruised area, patient denies any injuries, only doing PT at home, does have pain with bending arm, patient advised he should be seen since this could be from his therapies   Previous result: Supratherapeutic  Additional findings: None       PLAN     Recommended plan for no diet, medication or health factor changes affecting INR     Dosing Instructions: decrease your warfarin dose (11.1% change) with next INR in 7-10 days       Summary  As of 8/1/2024      Full warfarin instructions:  7.5 mg every Tue, Sat; 5 mg all other days   Next INR check:  8/8/2024               Telephone call with Nicko who verbalizes understanding and agrees to plan    Lab visit scheduled    Education provided: Please call back if any changes to your diet, medications or how you've been taking warfarin  Contact [phone] with any changes, questions or concerns.     Plan made with [facility] Pharmacist Luz Maria Andrew RN  Anticoagulation Clinic  8/1/2024    _______________________________________________________________________     Anticoagulation Episode Summary       Current INR goal:  Other - see comment   TTR:  54.0% (11.9 mo)   Target end date:  Indefinite    Send INR reminders to:  [facility]    Indications    PAF (paroxysmal atrial fibrillation) (H) [I48.0]  Mechanical AV Replacement 1992 -- on Warfarin  [Z95.2]             Comments:  3/20/24-goal 2.8-3.5             Anticoagulation Care Providers       Provider Role Specialty Phone number    Obdulio Ingram MD Referring Internal Medicine [phone]    Herve Morgan MD Referring Internal Medicine - Pediatrics [phone]

## 2024-08-02 NOTE — PROGRESS NOTES
Assessment & Plan     Elbow pain, right    Xray elbow Pos for arthritis Yossi Ya, [facility] PA-C    Tylenol  Ice compresses  compression  - XR Elbow Right G/E 3 Views    Superficial bruising of arm, right, initial encounter    Patient has elevated INR and has significant bruising on arm  There is some concern for hematoma due to bleeding and bruising  Will order an upper extremity ultrasound to evaluate for hematoma    Hematoma of skin    A hematoma is a bad bruise. It happens when an injury causes blood to collect and pool under the skin. The pooling blood gives the skin a spongy, rubbery, lumpy feel.  A hematoma usually is not a cause for concern. It is not the same thing as a blood clot in a vein, and it does not cause blood clots.    Arm swelling    Patient to scheduleu pper extremity ultrasound  - US Upper Extremity Venous Duplex Right    Long Term Use of Anti-coagulation therapy    Patient is working with the coumadin clinic to bring down his INR  Last INR was 4.7  This is likely the cause of his bruising       At today's visit with Todd S Aschoff , we discussed results, diagnosis, medications and formulated a plan.  We also discussed red flags for immediate return to clinic/ER, as well as indications for follow up with PCP if not improved in 3 days. Patient understood and agreed to plan. Todd S Aschoff was discharged with stable vitals and has no further questions.       No follow-ups on file.    Yossi Ya, [facility], PA-C  [facility]    Obinna Maharaj is a 68 year old male who presents to clinic today for the following health issues:  Chief Complaint   Patient presents with    Urgent Care     INR RN told pt to come in after waking up with pain and a red area, Tuesday it got bigger and is traveling up to wrist, has lots pain       HPI    Review of Systems  Constitutional, HEENT, cardiovascular, pulmonary, gi and gu systems are negative, except as otherwise noted.      Objective     BP (!) 135/91   Pulse 81   Temp 98.3  F (36.8  C)   Wt 144.2 kg (317 lb 12.8 oz)   SpO2 97%   BMI 43.10 kg/m    Physical Exam   GENERAL: alert and no distress  EYES: Eyes grossly normal to inspection, PERRL and conjunctivae and sclerae normal  HENT: ear canals and TM's normal, nose and mouth without ulcers or lesions  NECK: no adenopathy, no asymmetry, masses, or scars  RESP: lungs clear to auscultation - no rales, rhonchi or wheezes  CV: regular rate and rhythm, normal S1 S2, no S3 or S4, no murmur, click or rub, no peripheral edema  MS: pos for some tenderness around elbow  SKIN: pos for bruising and extended bruising down forearm  NEURO: Normal strength and tone, mentation intact and speech normal  PSYCH: mentation appears normal, affect normal/bright

## 2024-08-05 ENCOUNTER — HOSPITAL ENCOUNTER (OUTPATIENT)
Dept: ULTRASOUND IMAGING | Facility: CLINIC | Age: 69
Discharge: HOME | End: 2024-08-05
Attending: PHYSICIAN ASSISTANT | Admitting: PHYSICIAN ASSISTANT
Payer: COMMERCIAL

## 2024-08-05 DIAGNOSIS — M79.89 ARM SWELLING: ICD-10-CM

## 2024-08-05 PROCEDURE — 93971 EXTREMITY STUDY: CPT | Mod: RT

## 2024-08-13 ENCOUNTER — ANTICOAGULATION THERAPY VISIT (OUTPATIENT)
Dept: ANTICOAGULATION | Facility: CLINIC | Age: 69
End: 2024-08-13

## 2024-08-13 ENCOUNTER — LAB (OUTPATIENT)
Dept: LAB | Facility: CLINIC | Age: 69
End: 2024-08-13
Payer: COMMERCIAL

## 2024-08-13 DIAGNOSIS — I48.0 PAF (PAROXYSMAL ATRIAL FIBRILLATION) (H): ICD-10-CM

## 2024-08-13 DIAGNOSIS — Z95.2 AORTIC VALVE PROSTHESIS PRESENT: ICD-10-CM

## 2024-08-13 DIAGNOSIS — I48.0 PAF (PAROXYSMAL ATRIAL FIBRILLATION) (H): Primary | ICD-10-CM

## 2024-08-13 LAB — INR BLD: 3 (ref 0.9–1.1)

## 2024-08-13 PROCEDURE — 85610 PROTHROMBIN TIME: CPT

## 2024-08-13 PROCEDURE — 36416 COLLJ CAPILLARY BLOOD SPEC: CPT

## 2024-08-13 NOTE — PROGRESS NOTES
ANTICOAGULATION MANAGEMENT     Todd S Aschoff 68 year old male is on warfarin with therapeutic INR result. (Goal INR Other - see comment)    Recent labs: (last 7 days)     08/13/24  1442   INR 3.0*       ASSESSMENT     Source(s): Chart Review and Patient/Caregiver Call     Warfarin doses taken: Warfarin taken as instructed  Diet: No new diet changes identified  Medication/supplement changes: None noted  New illness, injury, or hospitalization: Yes, Urgent care visit on 8/1/24 for right elbow pain, patient reports the pain has resolved.  Signs or symptoms of bleeding or clotting: Yes:  ruising/hematoma on right arm, this has resolved  Previous result: Supratherapeutic  Additional findings: Warfarin maintenance dose was decreased 11% at last visit       PLAN     Recommended plan for temporary change(s) affecting INR     Dosing Instructions: Continue your current warfarin dose with next INR in 2 1/2 weeks       Summary  As of 8/13/2024      Full warfarin instructions:  7.5 mg every Tue, Sat; 5 mg all other days   Next INR check:  8/30/2024               Telephone call with Nicko who verbalizes understanding and agrees to plan    Lab visit scheduled    Education provided: Taking warfarin: Importance of taking warfarin as instructed    Plan made per [facility] anticoagulation protocol    Angely Smalls, RN  Anticoagulation Clinic  8/13/2024    _______________________________________________________________________     Anticoagulation Episode Summary       Current INR goal:  Other - see comment   TTR:  50.6% (11.9 mo)   Target end date:  Indefinite   Send INR reminders to:  AIDA GUERRERO    Indications    PAF (paroxysmal atrial fibrillation) (H) [I48.0]  Mechanical AV Replacement 1992 -- on Warfarin  [Z95.2]             Comments:  3/20/24-goal 2.8-3.5             Anticoagulation Care Providers       Provider Role Specialty Phone number    Obdulio Ingram MD Referring Internal Medicine [phone]    Herve Morgan MD Referring  Internal Medicine - Pediatrics [phone]

## 2024-08-19 DIAGNOSIS — M62.830 BACK MUSCLE SPASM: ICD-10-CM

## 2024-08-19 RX ORDER — DIAZEPAM 5 MG
5 TABLET ORAL
Qty: 30 TABLET | Refills: 0 | Status: SHIPPED | OUTPATIENT
Start: 2024-08-19 | End: 2024-09-16

## 2024-08-30 ENCOUNTER — LAB (OUTPATIENT)
Dept: LAB | Facility: CLINIC | Age: 69
End: 2024-08-30
Payer: COMMERCIAL

## 2024-08-30 ENCOUNTER — ANTICOAGULATION THERAPY VISIT (OUTPATIENT)
Dept: ANTICOAGULATION | Facility: CLINIC | Age: 69
End: 2024-08-30

## 2024-08-30 DIAGNOSIS — Z95.2 AORTIC VALVE PROSTHESIS PRESENT: ICD-10-CM

## 2024-08-30 DIAGNOSIS — I48.0 PAF (PAROXYSMAL ATRIAL FIBRILLATION) (H): ICD-10-CM

## 2024-08-30 DIAGNOSIS — I48.0 PAF (PAROXYSMAL ATRIAL FIBRILLATION) (H): Primary | ICD-10-CM

## 2024-08-30 LAB — INR BLD: 2.6 (ref 0.9–1.1)

## 2024-08-30 PROCEDURE — 85610 PROTHROMBIN TIME: CPT

## 2024-08-30 PROCEDURE — 36416 COLLJ CAPILLARY BLOOD SPEC: CPT

## 2024-08-30 NOTE — PROGRESS NOTES
ANTICOAGULATION MANAGEMENT     Todd S Aschoff 68 year old male is on warfarin with subtherapeutic INR result. (Goal INR Other - 2.8-3.5)    Recent labs: (last 7 days)     08/30/24  1059   INR 2.6*       ASSESSMENT     Source(s): Chart Review and Patient/Caregiver Call     Warfarin doses taken: Warfarin taken as instructed  Diet: No new diet changes identified except one of his two weekly salads was the night before his INR lab  Medication/supplement changes: None noted  New illness, injury, or hospitalization: No  Signs or symptoms of bleeding or clotting: No  Previous result: Therapeutic last visit; previously outside of goal range  Additional findings: None       PLAN     Recommended plan for no diet, medication or health factor changes affecting INR     Dosing Instructions: Increase your warfarin dose (6.2% change) with next INR in just over 2 weeks       Summary  As of 8/30/2024      Full warfarin instructions:  7.5 mg every Mon, Wed, Fri; 5 mg all other days   Next INR check:  9/16/2024               Telephone call with Nicko who agrees to plan and repeated back plan correctly    Lab visit scheduled    Education provided: Please call back if any changes to your diet, medications or how you've been taking warfarin  Dietary considerations: importance of consistent vitamin K intake  Contact [phone] with any changes, questions or concerns.     Plan made with [facility] Pharmacist Jazmine Preston RN  Anticoagulation Clinic  8/30/2024    _______________________________________________________________________     Anticoagulation Episode Summary       Current INR goal:  Other - see comment   TTR:  50.6% (11.9 mo)   Target end date:  Indefinite   Send INR reminders to:  AIDA GUERREOR    Indications    PAF (paroxysmal atrial fibrillation) (H) [I48.0]  Mechanical AV Replacement 1992 -- on Warfarin  [Z95.2]             Comments:  3/20/24-goal 2.8-3.5             Anticoagulation Care Providers        Provider Role Specialty Phone number    Obdulio Ingram MD Referring Internal Medicine [phone]    Herve Morgan MD Referring Internal Medicine - Pediatrics [phone]

## 2024-08-30 NOTE — PROGRESS NOTES
Per chart review, no changes/concerns noted. Patient did not fill out questionnaire prior to lab. Last INR on 8/13/24 therapeutic at 3.0. Nicko advised to continue maintenance dose and check next INR today, 2.5 weeks later. Prior to that, INR on 8/1/24 was 4.7. [facility] Pharmacist advised a full hold and to go to  for bruising/bleeding at right elbow, maintenance dose decrease of 11%.     Per consult with Jazmine Felipe [facility], for irregular INR goal range: I increased him 6.2% and set his recheck for 9/16 (just over 2 weeks since) since he has his annual wellness vitist that day.    Called patient. No answer. Left  to return call to [facility] RN.    Nanci SCOTT RN  Anticoagulation Team

## 2024-09-08 DIAGNOSIS — F33.1 MAJOR DEPRESSIVE DISORDER, RECURRENT EPISODE, MODERATE (H): ICD-10-CM

## 2024-09-09 RX ORDER — GUANFACINE 1 MG/1
1 TABLET ORAL AT BEDTIME
Qty: 90 TABLET | Refills: 1 | Status: SHIPPED | OUTPATIENT
Start: 2024-09-09

## 2024-09-16 ENCOUNTER — ANTICOAGULATION THERAPY VISIT (OUTPATIENT)
Dept: ANTICOAGULATION | Facility: CLINIC | Age: 69
End: 2024-09-16

## 2024-09-16 ENCOUNTER — OFFICE VISIT (OUTPATIENT)
Dept: PEDIATRICS | Facility: CLINIC | Age: 69
End: 2024-09-16
Payer: COMMERCIAL

## 2024-09-16 ENCOUNTER — LAB (OUTPATIENT)
Dept: LAB | Facility: CLINIC | Age: 69
End: 2024-09-16
Payer: COMMERCIAL

## 2024-09-16 VITALS
DIASTOLIC BLOOD PRESSURE: 87 MMHG | HEART RATE: 74 BPM | TEMPERATURE: 98.7 F | BODY MASS INDEX: 42.66 KG/M2 | OXYGEN SATURATION: 97 % | WEIGHT: 315 LBS | RESPIRATION RATE: 20 BRPM | SYSTOLIC BLOOD PRESSURE: 135 MMHG | HEIGHT: 72 IN

## 2024-09-16 DIAGNOSIS — I63.9 CEREBROVASCULAR ACCIDENT (CVA), UNSPECIFIED MECHANISM (H): ICD-10-CM

## 2024-09-16 DIAGNOSIS — I48.0 PAF (PAROXYSMAL ATRIAL FIBRILLATION) (H): ICD-10-CM

## 2024-09-16 DIAGNOSIS — G89.29 CHRONIC BILATERAL LOW BACK PAIN WITHOUT SCIATICA: ICD-10-CM

## 2024-09-16 DIAGNOSIS — Z12.5 SPECIAL SCREENING FOR MALIGNANT NEOPLASM OF PROSTATE: ICD-10-CM

## 2024-09-16 DIAGNOSIS — Z00.00 ENCOUNTER FOR MEDICARE ANNUAL WELLNESS EXAM: Primary | ICD-10-CM

## 2024-09-16 DIAGNOSIS — M62.830 BACK MUSCLE SPASM: ICD-10-CM

## 2024-09-16 DIAGNOSIS — R79.9 ABNORMAL FINDING OF BLOOD CHEMISTRY, UNSPECIFIED: ICD-10-CM

## 2024-09-16 DIAGNOSIS — I10 BENIGN ESSENTIAL HYPERTENSION: ICD-10-CM

## 2024-09-16 DIAGNOSIS — Z95.2 AORTIC VALVE PROSTHESIS PRESENT: ICD-10-CM

## 2024-09-16 DIAGNOSIS — Z29.11 NEED FOR VACCINATION AGAINST RESPIRATORY SYNCYTIAL VIRUS: ICD-10-CM

## 2024-09-16 DIAGNOSIS — M54.50 CHRONIC BILATERAL LOW BACK PAIN WITHOUT SCIATICA: ICD-10-CM

## 2024-09-16 DIAGNOSIS — F95.2 TOURETTE SYNDROME: ICD-10-CM

## 2024-09-16 DIAGNOSIS — I48.0 PAF (PAROXYSMAL ATRIAL FIBRILLATION) (H): Primary | ICD-10-CM

## 2024-09-16 DIAGNOSIS — Z12.11 SPECIAL SCREENING FOR MALIGNANT NEOPLASMS, COLON: ICD-10-CM

## 2024-09-16 DIAGNOSIS — I10 ESSENTIAL HYPERTENSION: ICD-10-CM

## 2024-09-16 DIAGNOSIS — N28.9 RENAL INSUFFICIENCY: ICD-10-CM

## 2024-09-16 DIAGNOSIS — F03.90 DEMENTIA WITHOUT BEHAVIORAL DISTURBANCE, PSYCHOTIC DISTURBANCE, MOOD DISTURBANCE, OR ANXIETY, UNSPECIFIED DEMENTIA SEVERITY, UNSPECIFIED DEMENTIA TYPE (H): ICD-10-CM

## 2024-09-16 PROBLEM — F03.B0 MODERATE DEMENTIA WITHOUT BEHAVIORAL DISTURBANCE, PSYCHOTIC DISTURBANCE, MOOD DISTURBANCE, OR ANXIETY, UNSPECIFIED DEMENTIA TYPE (H): Status: ACTIVE | Noted: 2024-09-16

## 2024-09-16 LAB
ALBUMIN SERPL BCG-MCNC: 3.9 G/DL (ref 3.5–5.2)
ALP SERPL-CCNC: 81 U/L (ref 40–150)
ALT SERPL W P-5'-P-CCNC: 27 U/L (ref 0–70)
ANION GAP SERPL CALCULATED.3IONS-SCNC: 10 MMOL/L (ref 7–15)
AST SERPL W P-5'-P-CCNC: 38 U/L (ref 0–45)
BILIRUB SERPL-MCNC: 1 MG/DL
BUN SERPL-MCNC: 19 MG/DL (ref 8–23)
CALCIUM SERPL-MCNC: 8.7 MG/DL (ref 8.8–10.4)
CHLORIDE SERPL-SCNC: 103 MMOL/L (ref 98–107)
CHOLEST SERPL-MCNC: 153 MG/DL
CREAT SERPL-MCNC: 0.92 MG/DL (ref 0.67–1.17)
EGFRCR SERPLBLD CKD-EPI 2021: >90 ML/MIN/1.73M2
FASTING STATUS PATIENT QL REPORTED: NO
FASTING STATUS PATIENT QL REPORTED: NO
GLUCOSE SERPL-MCNC: 102 MG/DL (ref 70–99)
HBA1C MFR BLD: 5.6 % (ref 0–5.6)
HCO3 SERPL-SCNC: 26 MMOL/L (ref 22–29)
HDLC SERPL-MCNC: 40 MG/DL
INR BLD: 4.1 (ref 0.9–1.1)
LDLC SERPL CALC-MCNC: 88 MG/DL
NONHDLC SERPL-MCNC: 113 MG/DL
POTASSIUM SERPL-SCNC: 4.5 MMOL/L (ref 3.4–5.3)
PROT SERPL-MCNC: 6.7 G/DL (ref 6.4–8.3)
PSA SERPL DL<=0.01 NG/ML-MCNC: 2.16 NG/ML (ref 0–4.5)
SODIUM SERPL-SCNC: 139 MMOL/L (ref 135–145)
TRIGL SERPL-MCNC: 127 MG/DL

## 2024-09-16 PROCEDURE — G0008 ADMIN INFLUENZA VIRUS VAC: HCPCS | Performed by: INTERNAL MEDICINE

## 2024-09-16 PROCEDURE — 36416 COLLJ CAPILLARY BLOOD SPEC: CPT

## 2024-09-16 PROCEDURE — 36415 COLL VENOUS BLD VENIPUNCTURE: CPT | Performed by: INTERNAL MEDICINE

## 2024-09-16 PROCEDURE — G0103 PSA SCREENING: HCPCS | Performed by: INTERNAL MEDICINE

## 2024-09-16 PROCEDURE — 80061 LIPID PANEL: CPT | Performed by: INTERNAL MEDICINE

## 2024-09-16 PROCEDURE — 99214 OFFICE O/P EST MOD 30 MIN: CPT | Mod: 25 | Performed by: INTERNAL MEDICINE

## 2024-09-16 PROCEDURE — 90662 IIV NO PRSV INCREASED AG IM: CPT | Performed by: INTERNAL MEDICINE

## 2024-09-16 PROCEDURE — 80053 COMPREHEN METABOLIC PANEL: CPT | Performed by: INTERNAL MEDICINE

## 2024-09-16 PROCEDURE — G0439 PPPS, SUBSEQ VISIT: HCPCS | Performed by: INTERNAL MEDICINE

## 2024-09-16 PROCEDURE — 83036 HEMOGLOBIN GLYCOSYLATED A1C: CPT | Performed by: INTERNAL MEDICINE

## 2024-09-16 PROCEDURE — 85610 PROTHROMBIN TIME: CPT

## 2024-09-16 RX ORDER — PREGABALIN 100 MG/1
100 CAPSULE ORAL 3 TIMES DAILY
Qty: 270 CAPSULE | Refills: 3 | Status: SHIPPED | OUTPATIENT
Start: 2024-09-16

## 2024-09-16 RX ORDER — LISINOPRIL 10 MG/1
10 TABLET ORAL DAILY
Qty: 90 TABLET | Refills: 3 | Status: SHIPPED | OUTPATIENT
Start: 2024-09-16

## 2024-09-16 RX ORDER — DIAZEPAM 5 MG
5 TABLET ORAL
Qty: 30 TABLET | Refills: 0 | Status: SHIPPED | OUTPATIENT
Start: 2024-09-19

## 2024-09-16 SDOH — HEALTH STABILITY: PHYSICAL HEALTH: ON AVERAGE, HOW MANY DAYS PER WEEK DO YOU ENGAGE IN MODERATE TO STRENUOUS EXERCISE (LIKE A BRISK WALK)?: 5 DAYS

## 2024-09-16 ASSESSMENT — PATIENT HEALTH QUESTIONNAIRE - PHQ9
10. IF YOU CHECKED OFF ANY PROBLEMS, HOW DIFFICULT HAVE THESE PROBLEMS MADE IT FOR YOU TO DO YOUR WORK, TAKE CARE OF THINGS AT HOME, OR GET ALONG WITH OTHER PEOPLE: NOT DIFFICULT AT ALL
SUM OF ALL RESPONSES TO PHQ QUESTIONS 1-9: 7
SUM OF ALL RESPONSES TO PHQ QUESTIONS 1-9: 7

## 2024-09-16 ASSESSMENT — PAIN SCALES - GENERAL: PAINLEVEL: SEVERE PAIN (6)

## 2024-09-16 NOTE — PROGRESS NOTES
ANTICOAGULATION MANAGEMENT     Todd S Aschoff 68 year old male is on warfarin with supratherapeutic INR result. (Goal INR Other - see comment)    Recent labs: (last 7 days)     09/16/24  1258   INR 4.1*       ASSESSMENT     Source(s): Chart Review and Patient/Caregiver Call     Warfarin doses taken: Warfarin taken as instructed  Diet: Decreased greens/vitamin K in diet; plans to resume previous intake - did not have any servings of Vit K the last couple weeks and typically has 3 servings weekly  Medication/supplement changes: None noted  New illness, injury, or hospitalization: No  Signs or symptoms of bleeding or clotting: No  Previous result: Subtherapeutic  Additional findings: Has both 2.5 and 5 mg tablets on hand.  OV today, Nicko will be scheduling a colonoscopy. Requested he inform [facility] when date is known.        PLAN     Recommended plan for temporary change(s) affecting INR     Dosing Instructions: partial hold + add greens back into diet then continue your current warfarin dose with next INR in 2 weeks       Summary  As of 9/16/2024      Full warfarin instructions:  9/16: 5 mg; Otherwise 7.5 mg every Mon, Wed, Fri; 5 mg all other days   Next INR check:  9/30/2024               Telephone call with Nicko who verbalizes understanding and agrees to plan    Lab visit scheduled    Education provided: Please call back if any changes to your diet, medications or how you've been taking warfarin  Dietary considerations: impact of vitamin K foods on INR  Importance of notifying anticoagulation clinic for: upcoming surgeries and procedures 2 weeks in advance    Plan made with [facility] Pharmacist Jazmine Andrew RN  9/16/2024  Anticoagulation Clinic  [employer] for routing messages: tamanna GUERRERO  [facility] patient phone line: [phone]        _______________________________________________________________________     Anticoagulation Episode Summary       Current INR goal:  Other - see comment   TTR:   47.1% (11.9 mo)   Target end date:  Indefinite   Send INR reminders to:  [facility]    Indications    PAF (paroxysmal atrial fibrillation) (H) [I48.0]  Mechanical AV Replacement 1992 -- on Warfarin  [Z95.2]             Comments:  3/20/24-goal 2.8-3.5             Anticoagulation Care Providers       Provider Role Specialty Phone number    Obdulio Ingram MD Referring Internal Medicine [phone]    Herve Morgan MD Referring Internal Medicine - Pediatrics [phone]

## 2024-09-16 NOTE — PATIENT INSTRUCTIONS
Your thyroid was within normal limits in April.      Lab work today:  We can do labs in the exam room today, or you can get them done downstairs in the lab.      Get your sleep study done.      Flu vaccine today.    Call to schedule a colonoscopy.  We'll need to do bridging with lovenox when you get it.    No changes in meds for now.   Taking lyrica for your back and leg pain.    I refilled all meds except propafenone:    Make an appointment with Dr. Miles for follow up and refills of your propafenone.      Patient Education   Preventive Care Advice   This is general advice given by our system to help you stay healthy. However, your care team may have specific advice just for you. Please talk to your care team about your preventive care needs.  Nutrition  Eat 5 or more servings of fruits and vegetables each day.  Try wheat bread, brown rice and whole grain pasta (instead of white bread, rice, and pasta).  Get enough calcium and vitamin D. Check the label on foods and aim for 100% of the RDA (recommended daily allowance).  Lifestyle  Exercise at least 150 minutes each week  (30 minutes a day, 5 days a week).  Do muscle strengthening activities 2 days a week. These help control your weight and prevent disease.  No smoking.  Wear sunscreen to prevent skin cancer.  Have a dental exam and cleaning every 6 months.  Yearly exams  See your health care team every year to talk about:  Any changes in your health.  Any medicines your care team has prescribed.  Preventive care, family planning, and ways to prevent chronic diseases.  Shots (vaccines)   HPV shots (up to age 26), if you've never had them before.  Hepatitis B shots (up to age 59), if you've never had them before.  COVID-19 shot: Get this shot when it's due.  Flu shot: Get a flu shot every year.  Tetanus shot: Get a tetanus shot every 10 years.  Pneumococcal, hepatitis A, and RSV shots: Ask your care team if you need these based on your risk.  Shingles shot (for age  50 and up)  General health tests  Diabetes screening:  Starting at age 35, Get screened for diabetes at least every 3 years.  If you are younger than age 35, ask your care team if you should be screened for diabetes.  Cholesterol test: At age 39, start having a cholesterol test every 5 years, or more often if advised.  Bone density scan (DEXA): At age 50, ask your care team if you should have this scan for osteoporosis (brittle bones).  Hepatitis C: Get tested at least once in your life.  STIs (sexually transmitted infections)  Before age 24: Ask your care team if you should be screened for STIs.  After age 24: Get screened for STIs if you're at risk. You are at risk for STIs (including HIV) if:  You are sexually active with more than one person.  You don't use condoms every time.  You or a partner was diagnosed with a sexually transmitted infection.  If you are at risk for HIV, ask about PrEP medicine to prevent HIV.  Get tested for HIV at least once in your life, whether you are at risk for HIV or not.  Cancer screening tests  Cervical cancer screening: If you have a cervix, begin getting regular cervical cancer screening tests starting at age 21.  Breast cancer scan (mammogram): If you've ever had breasts, begin having regular mammograms starting at age 40. This is a scan to check for breast cancer.  Colon cancer screening: It is important to start screening for colon cancer at age 45.  Have a colonoscopy test every 10 years (or more often if you're at risk) Or, ask your provider about stool tests like a FIT test every year or Cologuard test every 3 years.  To learn more about your testing options, visit:   .  For help making a decision, visit:   https://bit.ly/bg03022.  Prostate cancer screening test: If you have a prostate, ask your care team if a prostate cancer screening test (PSA) at age 55 is right for you.  Lung cancer screening: If you are a current or former smoker ages 50 to 80, ask your care team if  ongoing lung cancer screenings are right for you.  For informational purposes only. Not to replace the advice of your health care provider. Copyright   2023 [facility]. All rights reserved. Clinically reviewed by the [facility] Transitions Program. [employer] [id number] - REV 01/24.  Preventing Falls: Care Instructions  Injuries and health problems such as trouble walking or poor eyesight can increase your risk of falling. So can some medicines. But there are things you can do to help prevent falls. You can exercise to get stronger. You can also arrange your home to make it safer.    Talk to your doctor about the medicines you take. Ask if any of them increase the risk of falls and whether they can be changed or stopped.   Try to exercise regularly. It can help improve your strength and balance. This can help lower your risk of falling.     Practice fall safety and prevention.    Wear low-heeled shoes that fit well and give your feet good support. Talk to your doctor if you have foot problems that make this hard.  Carry a cellphone or wear a medical alert device that you can use to call for help.  Use stepladders instead of chairs to reach high objects. Don't climb if you're at risk for falls. Ask for help, if needed.  Wear the correct eyeglasses, if you need them.    Make your home safer.    Remove rugs, cords, clutter, and furniture from walkways.  Keep your house well lit. Use night-lights in hallways and bathrooms.  Install and use sturdy handrails on stairways.  Wear nonskid footwear, even inside. Don't walk barefoot or in socks without shoes.    Be safe outside.    Use handrails, curb cuts, and ramps whenever possible.  Keep your hands free by using a shoulder bag or backpack.  Try to walk in well-lit areas. Watch out for uneven ground, changes in pavement, and debris.  Be careful in the winter. Walk on the grass or gravel when sidewalks are slippery. Use de-icer on steps and walkways. Add  "non-slip devices to shoes.    Put grab bars and nonskid mats in your shower or tub and near the toilet. Try to use a shower chair or bath bench when bathing.   Get into a tub or shower by putting in your weaker leg first. Get out with your strong side first. Have a phone or medical alert device in the bathroom with you.   Where can you learn more?  Go to https://www.[employer].Bromium/patiented  Enter G117 in the search box to learn more about \"Preventing Falls: Care Instructions.\"  Current as of: July 17, 2023               Content Version: 14.0    3830-6807 [employer].   Care instructions adapted under license by your healthcare professional. If you have questions about a medical condition or this instruction, always ask your healthcare professional. [employer] disclaims any warranty or liability for your use of this information.      Hearing Loss: Care Instructions  Overview     Hearing loss is a sudden or slow decrease in how well you hear. It can range from slight to profound. Permanent hearing loss can occur with aging. It also can happen when you are exposed long-term to loud noise. Examples include listening to loud music, riding motorcycles, or being around other loud machines.  Hearing loss can affect your work and home life. It can make you feel lonely or depressed. You may feel that you have lost your independence. But hearing aids and other devices can help you hear better and feel connected to others.  Follow-up care is a key part of your treatment and safety. Be sure to make and go to all appointments, and call your doctor if you are having problems. It's also a good idea to know your test results and keep a list of the medicines you take.  How can you care for yourself at home?  Avoid loud noises whenever possible. This helps keep your hearing from getting worse.  Always wear hearing protection around loud noises.  Wear a hearing aid as directed.  A professional can help you " "pick a hearing aid that will work best for you.  You can also get hearing aids over the counter for mild to moderate hearing loss.  Have hearing tests as your doctor suggests. They can show whether your hearing has changed. Your hearing aid may need to be adjusted.  Use other devices as needed. These may include:  Telephone amplifiers and hearing aids that can connect to a television, stereo, radio, or microphone.  Devices that use lights or vibrations. These alert you to the doorbell, a ringing telephone, or a baby monitor.  Television closed-captioning. This shows the words at the bottom of the screen. Most new TVs can do this.  TTY (text telephone). This lets you type messages back and forth on the telephone instead of talking or listening. These devices are also called TDD. When messages are typed on the keyboard, they are sent over the phone line to a receiving TTY. The message is shown on a monitor.  Use text messaging, social media, and email if it is hard for you to communicate by telephone.  Try to learn a listening technique called speechreading. It is not lipreading. You pay attention to people's gestures, expressions, posture, and tone of voice. These clues can help you understand what a person is saying. Face the person you are talking to, and have them face you. Make sure the lighting is good. You need to see the other person's face clearly.  Think about counseling if you need help to adjust to your hearing loss.  When should you call for help?  Watch closely for changes in your health, and be sure to contact your doctor if:    You think your hearing is getting worse.     You have new symptoms, such as dizziness or nausea.   Where can you learn more?  Go to https://www.CallGrader.net/patiented  Enter R798 in the search box to learn more about \"Hearing Loss: Care Instructions.\"  Current as of: September 27, 2023               Content Version: 14.0    5426-9550 [employer].   Care " instructions adapted under license by your healthcare professional. If you have questions about a medical condition or this instruction, always ask your healthcare professional. Healthwise, [facility] disclaims any warranty or liability for your use of this information.      Learning About Sleeping Well  What does sleeping well mean?     Sleeping well means getting enough sleep to feel good and stay healthy. How much sleep is enough varies among people.  The number of hours you sleep and how you feel when you wake up are both important. If you do not feel refreshed, you probably need more sleep. Another sign of not getting enough sleep is feeling tired during the day.  Experts recommend that adults get at least 7 or more hours of sleep per day. Children and older adults need more sleep.  Why is getting enough sleep important?  Getting enough quality sleep is a basic part of good health. When your sleep suffers, your physical health, mood, and your thoughts can suffer too. You may find yourself feeling more grumpy or stressed. Not getting enough sleep also can lead to serious problems, including injury, accidents, anxiety, and depression.  What might cause poor sleeping?  Many things can cause sleep problems, including:  Changes to your sleep schedule.  Stress. Stress can be caused by fear about a single event, such as giving a speech. Or you may have ongoing stress, such as worry about work or school.  Depression, anxiety, and other mental or emotional conditions.  Changes in your sleep habits or surroundings. This includes changes that happen where you sleep, such as noise, light, or sleeping in a different bed. It also includes changes in your sleep pattern, such as having jet lag or working a late shift.  Health problems, such as pain, breathing problems, and restless legs syndrome.  Lack of regular exercise.  Using alcohol, nicotine, or caffeine before bed.  How can you help yourself?  Here are some tips that  "may help you sleep more soundly and wake up feeling more refreshed.  Your sleeping area   Use your bedroom only for sleeping and sex. A bit of light reading may help you fall asleep. But if it doesn't, do your reading elsewhere in the house. Try not to use your TV, computer, smartphone, or tablet while you are in bed.  Be sure your bed is big enough to stretch out comfortably, especially if you have a sleep partner.  Keep your bedroom quiet, dark, and cool. Use curtains, blinds, or a sleep mask to block out light. To block out noise, use earplugs, soothing music, or a \"white noise\" machine.  Your evening and bedtime routine   Create a relaxing bedtime routine. You might want to take a warm shower or bath, or listen to soothing music.  Go to bed at the same time every night. And get up at the same time every morning, even if you feel tired.  What to avoid   Limit caffeine (coffee, tea, caffeinated sodas) during the day, and don't have any for at least 6 hours before bedtime.  Avoid drinking alcohol before bedtime. Alcohol can cause you to wake up more often during the night.  Try not to smoke or use tobacco, especially in the evening. Nicotine can keep you awake.  Limit naps during the day, especially close to bedtime.  Avoid lying in bed awake for too long. If you can't fall asleep or if you wake up in the middle of the night and can't get back to sleep within about 20 minutes, get out of bed and go to another room until you feel sleepy.  Avoid taking medicine right before bed that may keep you awake or make you feel hyper or energized. Your doctor can tell you if your medicine may do this and if you can take it earlier in the day.  If you can't sleep   Imagine yourself in a peaceful, pleasant scene. Focus on the details and feelings of being in a place that is relaxing.  Get up and do a quiet or boring activity until you feel sleepy.  Avoid drinking any liquids before going to bed to help prevent waking up often to " "use the bathroom.  Where can you learn more?  Go to https://www.[employer].net/patiented  Enter J942 in the search box to learn more about \"Learning About Sleeping Well.\"  Current as of: July 10, 2023  Content Version: 14.1 2006-2024 [employer].   Care instructions adapted under license by your healthcare professional. If you have questions about a medical condition or this instruction, always ask your healthcare professional. [employer] disclaims any warranty or liability for your use of this information.    Bladder Training: Care Instructions  Your Care Instructions     Bladder training is used to treat urge incontinence and stress incontinence. Urge incontinence means that the need to urinate comes on so fast that you can't get to a toilet in time. Stress incontinence means that you leak urine because of pressure on your bladder. For example, it may happen when you laugh, cough, or lift something heavy.  Bladder training can increase how long you can wait before you have to urinate. It can also help your bladder hold more urine. And it can give you better control over the urge to urinate.  It is important to remember that bladder training takes a few weeks to a few months to make a difference. You may not see results right away, but don't give up.  Follow-up care is a key part of your treatment and safety. Be sure to make and go to all appointments, and call your doctor if you are having problems. It's also a good idea to know your test results and keep a list of the medicines you take.  How can you care for yourself at home?  Work with your doctor to come up with a bladder training program that is right for you. You may use one or more of the following methods.  Delayed urination  In the beginning, try to keep from urinating for 5 minutes after you first feel the need to go.  While you wait, take deep, slow breaths to relax. Kegel exercises can also help you delay the need to go to " "the bathroom.  After some practice, when you can easily wait 5 minutes to urinate, try to wait 10 minutes before you urinate.  Slowly increase the waiting period until you are able to control when you have to urinate.  Scheduled urination  Empty your bladder when you first wake up in the morning.  Schedule times throughout the day when you will urinate.  Start by going to the bathroom every hour, even if you don't need to go.  Slowly increase the time between trips to the bathroom.  When you have found a schedule that works well for you, keep doing it.  If you wake up during the night and have to urinate, do it. Apply your schedule to waking hours only.  Kegel exercises  These tighten and strengthen pelvic muscles, which can help you control the flow of urine. (If doing these exercises causes pain, stop doing them and talk with your doctor.) To do Kegel exercises:  Squeeze your muscles as if you were trying not to pass gas. Or squeeze your muscles as if you were stopping the flow of urine. Your belly, legs, and buttocks shouldn't move.  Hold the squeeze for 3 seconds, then relax for 5 to 10 seconds.  Start with 3 seconds, then add 1 second each week until you are able to squeeze for 10 seconds.  Repeat the exercise 10 times a session. Do 3 to 8 sessions a day.  When should you call for help?  Watch closely for changes in your health, and be sure to contact your doctor if:    Your incontinence is getting worse.     You do not get better as expected.   Where can you learn more?  Go to https://www.I[employer]ika.net/patiented  Enter V684 in the search box to learn more about \"Bladder Training: Care Instructions.\"  Current as of: November 15, 2023               Content Version: 14.0    5861-4394 Healthwise, Inc[employer].   Care instructions adapted under license by your healthcare professional. If you have questions about a medical condition or this instruction, always ask your healthcare professional. [employer], " [facility] disclaims any warranty or liability for your use of this information.      Learning About Depression Screening  What is depression screening?  Depression screening is a way to see if you have depression symptoms. It may be done by a doctor or counselor. It's often part of a routine checkup. That's because your mental health is just as important as your physical health.  Depression is a mental health condition that affects how you feel, think, and act. You may:  Have less energy.  Lose interest in your daily activities.  Feel sad and grouchy for a long time.  Depression is very common. It affects people of all ages.  Many things can lead to depression. Some people become depressed after they have a stroke or find out they have a major illness like cancer or heart disease. The death of a loved one or a breakup may lead to depression. It can run in families. Most experts believe that a combination of inherited genes and stressful life events can cause it.  What happens during screening?  You may be asked to fill out a form about your depression symptoms. You and the doctor will discuss your answers. The doctor may ask you more questions to learn more about how you think, act, and feel.  What happens after screening?  If you have symptoms of depression, your doctor will talk to you about your options.  Doctors usually treat depression with medicines or counseling. Often, combining the two works best. Many people don't get help because they think that they'll get over the depression on their own. But people with depression may not get better unless they get treatment.  The cause of depression is not well understood. There may be many factors involved. But if you have depression, it's not your fault.  A serious symptom of depression is thinking about death or suicide. If you or someone you care about talks about this or about feeling hopeless, get help right away.  It's important to know that depression can  "be treated. Medicine, counseling, and self-care may help.  Where can you learn more?  Go to https://www.Facishare.net/patiented  Enter T185 in the search box to learn more about \"Learning About Depression Screening.\"  Current as of: June 24, 2023  Content Version: 14.1 2006-2024 [employer].   Care instructions adapted under license by your healthcare professional. If you have questions about a medical condition or this instruction, always ask your healthcare professional. Healthwise, [employer] disclaims any warranty or liability for your use of this information.       "

## 2024-09-16 NOTE — PROGRESS NOTES
Preventive Care Visit  [facility] YOLANDA Morgan MD, Internal Medicine - Pediatrics  Sep 16, 2024      Assessment & Plan       Encounter for Medicare annual wellness exam  Discussed diet, exercise, testicular self exam, blood pressure, cholesterol, and need for cancer surveillance at appropriate ages.     Special screening for malignant neoplasms, colon  Due. Ordered.  Will need bridging.   - Colonoscopy Screening  Referral; Future    Benign essential hypertension  At goal.   - Comprehensive metabolic panel (BMP + Alb, Alk Phos, ALT, AST, Total. Bili, TP); Future  - Hemoglobin A1c; Future  - Lipid panel reflex to direct LDL Fasting; Future  - Comprehensive metabolic panel (BMP + Alb, Alk Phos, ALT, AST, Total. Bili, TP)  - Hemoglobin A1c  - Lipid panel reflex to direct LDL Fasting    PAF (paroxysmal atrial fibrillation) (H)  Propafenone per cardiology.   - Adult Cardiology Eval  Referral; Future    Renal insufficiency    - Comprehensive metabolic panel (BMP + Alb, Alk Phos, ALT, AST, Total. Bili, TP); Future  - Comprehensive metabolic panel (BMP + Alb, Alk Phos, ALT, AST, Total. Bili, TP)    Tourette syndrome  Ongoing guanfacine.     Dementia without behavioral disturbance, psychotic disturbance, mood disturbance, or anxiety, unspecified dementia severity, unspecified dementia type (H)  Ongoing donepizil.     Special screening for malignant neoplasm of prostate    - PSA, screen; Future  - PSA, screen    Abnormal finding of blood chemistry, unspecified  Screening for impaired fasting glucose.   - Hemoglobin A1c; Future  - Hemoglobin A1c    Hx of CVA x 4, 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  Secondary risk factor modification.     Back muscle spasm  At bedtime for sleep and spasm.   - diazepam (VALIUM) 5 MG tablet; Take 1 tablet (5 mg) by mouth nightly as needed for anxiety.    Essential hypertension  Blood pressure controlled.   - lisinopril (ZESTRIL) 10 MG tablet; Take 1 tablet (10 mg) by mouth  daily.    Chronic bilateral low back pain without sciatica  Refilled.   - pregabalin (LYRICA) 100 MG capsule; Take 1 capsule (100 mg) by mouth 3 times daily. Do not take if sleepy/sedated.    Patient has been advised of split billing requirements and indicates understanding: Yes        Counseling  Appropriate preventive services were addressed with this patient via screening, questionnaire, or discussion as appropriate for fall prevention, nutrition, physical activity, Tobacco-use cessation, social engagement, weight loss and cognition.  Checklist reviewing preventive services available has been given to the patient.  Reviewed patient's diet, addressing concerns and/or questions.   Discussed possible causes of fatigue. The patient was provided with written information regarding signs of hearing loss.   Information on urinary incontinence and treatment options given to patient.   The patient's PHQ-9 score is consistent with mild depression. He was provided with information regarding depression.       See Patient Instructions    Obinna Maharaj is a 68 year old, presenting for the following:  Physical        9/16/2024     1:17 PM   Additional Questions   Roomed by KM   Accompanied by Self         9/16/2024     1:17 PM   Patient Reported Additional Medications   Patient reports taking the following new medications None           HPI    Feels can't sleep past 6 AM. Would like to sleep in.  Takes an hour nap in afternoon. Gets 9 hours of sleep.    Never got sleep study done.  Was scheduled and had an emergency.  (In TCU).  Has not rescheduled.               9/16/2024   General Health   How would you rate your overall physical health? Good   Feel stress (tense, anxious, or unable to sleep) Not at all            9/16/2024   Nutrition   Diet: Low fat/cholesterol            9/16/2024   Exercise   Days per week of moderate/strenous exercise 5 days   Average minutes spent exercising at this level 60 min            9/16/2024    Social Factors   Frequency of gathering with friends or relatives Twice a week   Worry food won't last until get money to buy more No   Food not last or not have enough money for food? No   Do you have housing? (Housing is defined as stable permanent housing and does not include staying ouside in a car, in a tent, in an abandoned building, in an overnight shelter, or couch-surfing.) Yes   Are you worried about losing your housing? No   Lack of transportation? No   Unable to get utilities (heat,electricity)? No            9/16/2024   Fall Risk   Fallen 2 or more times in the past year? No   Trouble with walking or balance? Yes   Gait Speed Test (Document in seconds) 6.18   Gait Speed Test Interpretation Greater than 5.01 seconds - ABNORMAL             9/16/2024   Activities of Daily Living- Home Safety   Needs help with the following daily activites None of the above   Safety concerns in the home None of the above            9/16/2024   Dental   Dentist two times every year? Yes            9/16/2024   Hearing Screening   Hearing concerns? (!) I NEED TO ASK PEOPLE TO SPEAK UP OR REPEAT THEMSELVES.            9/16/2024   Driving Risk Screening   Patient/family members have concerns about driving No            9/16/2024   General Alertness/Fatigue Screening   Have you been more tired than usual lately? (!) YES            9/16/2024   Urinary Incontinence Screening   Bothered by leaking urine in past 6 months Yes            9/16/2024   TB Screening   Were you born outside of the [place]? No          Today's PHQ-9 Score:       9/16/2024     1:04 PM   PHQ-9 SCORE   PHQ-9 Total Score MyChart 7 (Mild depression)   PHQ-9 Total Score 7         9/16/2024   Substance Use   Alcohol more than 3/day or more than 7/wk No   Do you have a current opioid prescription? No   How severe/bad is pain from 1 to 10? 6/10   Do you use any other substances recreationally? No        Social History     Tobacco Use    Smoking status: Never     Passive  exposure: Past    Smokeless tobacco: Never   Vaping Use    Vaping status: Never Used   Substance Use Topics    Alcohol use: No     Comment: Stopped drinking alcohol ~2009    Drug use: No           9/16/2024   AAA Screening   Family history of Abdominal Aortic Aneurysm (AAA)? (!) YES       Last PSA:   PSA   Date Value Ref Range Status   01/14/2020 6.93 (H) 0 - 4 ug/L Final     Comment:     Assay Method:  Chemiluminescence using Siemens Vista analyzer     PSA Tumor Marker   Date Value Ref Range Status   03/13/2024 2.70 0.00 - 4.00 ug/L Final     ASCVD Risk   The ASCVD Risk score (Jackie MARTIN, et al., 2019) failed to calculate for the following reasons:    The patient has a prior MI or stroke diagnosis            Reviewed and updated as needed this visit by Provider                    Past Medical History:   Diagnosis Date    Advanced directives, counseling/discussion 1/6/2012    Discussed Advance Directive planning with patient; information given to patient to review.    Alcohol dependence (H)     Alcohol dependence in remission (H) 8/2/2018    Allergy, unspecified not elsewhere classified     seasonal    Anemia, unspecified type 1/22/2021    Aortic valve prosthesis present 2/8/2021    Atrial fibrillation (H)     Benign prostatic hyperplasia 2/8/2021    Bilateral thoracic back pain 11/16/2016    BPH (benign prostatic hypertrophy)     Chronic atrial fibrillation (H) 8/7/2002    Chronic infection     low back wound incision not healing     Chronic low back pain 8/19/2011    Chronic pain     lower back and right leg and left leg    Depressive disorder 6/9/2010    Depression  NOS    Dissection of aorta, thoracic (H) 1992    St Jose F aotic valve + arch graft 1992    Elevated prostate specific antigen (PSA) 1/22/2020    Family history of prostate cancer 1/22/2020    Generalized muscle weakness 1/22/2021    Headache(784.0)     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 Provider to review    Leg wound, left 1/9/2021    Long term current use of anticoagulant therapy 8/7/2002    LW Modifier:  Coumadin, since mechanical aortic valve LW Onset:  1992 ; Anticoagulant Rx Long Term Problem list name updated by automated process. Provider to review    Low back pain     Lumbar radiculopathy 4/3/2013    Lumbar surgical wound fluid collection 5/23/2013    Major depression     Memory difficulties 1/16/2015    Mixed hyperlipidemia     Morbid obesity (H) 4/20/2010    LW Modifier:  BMI 41.3 (Apr 2010) ; Obesity Morbid    Numbness and tingling     right leg post surg rightt hip/ also left leg since surg    OA (osteoarthritis)     hips    OAB (overactive bladder) 5/11/2015    Obesity, unspecified     Persons encountering health services in other specified circumstances 4/3/2012    Formatting of this note might be different from the original. EMERGENCY CARE PLAN Presenting Problem Signs and Symptoms Treatment Plan   Questions or conerns during clinic hours    I will call the clinic directly    Questions or conerns outside clinic hours    I will call the 24 hour nurse line at [phone]   Patient needs to schedule an appointment    I will call the 24 hour scheduling team at    Polypharmacy 1/22/2021    Prostate infection     Radiculitis 4/20/2010    LW Modifier:  Right foot, s/p R AMANDA LW Onset:  2002 ; Neuralgia    Recurrent major depressive episodes, in full remission (H24) 10/30/2012    Renal insufficiency 1/22/2021    Rotator cuff tear 1/26/2015    Rotator cuff tear, right    S/P lumbar fusion 4/5/2018    S/P [employer] 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 Jose F's valve    APPLY WOUND VAC Left 1/26/2021    Procedure: Placement of negative pressure wound therapy 2,400 squared centimeters  ;  Surgeon: Lazaro Plascencia MD;  Location: [facility] OR    CATARACT IOL, RT/LT      rt eye only    CHOLECYSTECTOMY, LAPOROSCOPIC  8/10    CLOSE SECONDARY WOUND LOWER EXTREMITY Left 2/3/2021    Procedure:  Split Thickness Skin Graft to Leg of 18 square centimeters  Intermediate Closure of Leg of 8cm   ;  Surgeon: Lazaro Plascencia MD;  Location: [facility] OR    COLONOSCOPY N/A 1/15/2015    Procedure: COLONOSCOPY;  Surgeon: Johnson Kothari MD;  Location:  GI    DECOMPRESSION LUMBAR ONE LEVEL  4/3/2013    Procedure: DECOMPRESSION LUMBAR ONE LEVEL;  Open Decompression L3-4 bilateral;  Surgeon: Travis Coffey MD;  Location: [facility] OR    DECOMPRESSION, FUSION CERVICAL ANTERIOR ONE LEVEL, COMBINED  3/23/2012    Procedure:COMBINED DECOMPRESSION, FUSION CERVICAL ANTERIOR ONE LEVEL; Anterior Cervical Decompression and Fusion C4-6; Surgeon:TRAVIS COFFEY; Location:[facility] OR    ELBOW SURGERY      EXPLORE SPINE, REMOVE HARDWARE, COMBINED  5/23/2013    Procedure: COMBINED EXPLORE SPINE, REMOVE HARDWARE;  Exploration Lumbar Wound for fluid collection;  Surgeon: Travis Coffey MD;  Location: [facility] OR    FUSION CERVICAL ANTERIOR TWO LEVELS  3/26/2012    Procedure:FUSION CERVICAL ANTERIOR TWO LEVELS; Anterior Cervical Fusion C4-6, Anterior Cervical  Hematoma Evacuation; Surgeon:TRAVIS COFFEY; Location:[facility] OR    IR LUMBAR EPIDURAL STEROID INJECTION  3/28/2003    IRRIGATION AND DEBRIDEMENT LOWER EXTREMITY, COMBINED Left 1/10/2021    Procedure: 1.  Irrigation and excisional debridement to  muscle left distal medial calf chronic wounds total area measuring 9 cm x 4 cm 2.  Irrigation and excisional debridement to fascia left medial calf chronic hematoma measuring 29 x 6.7 x 4.2 cm 3.  Primary complex wound closure left medial distal calf 9 cm in length;  Surgeon: Rod Kemp MD;  Location: [facility] OR    IRRIGATION AND DEBRIDEMENT LOWER EXTREMITY, COMBINED Left 1/26/2021    Procedure: Evacuation of hematoma debridement of skin, subcutaneous tissue and muscle 2,400 squared centimeters, placement of negative pressure wound therapy 2,400 squared centimeters;  Surgeon: Lazaro Plascencia MD;  Location: [facility] OR    IRRIGATION AND DEBRIDEMENT SPINE, CLOSE WOUND, COMBINED  3/26/2012    Procedure:COMBINED IRRIGATION AND DEBRIDEMENT SPINE, CLOSE WOUND; Surgeon:TRAVIS COFFEY; Location:[facility] OR    OTHER SURGICAL HISTORY      KS UNLISTED ORBIT    OTHER SURGICAL HISTORY      KS UNLISTED SPINE    OTHER SURGICAL HISTORY      KS UNLISTED NECK/THORAX    OTHER SURGICAL HISTORY      (IA) [place] TOTAL HIP ARTHROPLASTY    OTHER SURGICAL HISTORY      (IA) [place] NEE SCOPE MED W LAT MENISCECT WWO DEBRIBE/SHAVE ANY CO    removal of cyst of back   2.5week    TONSILLECTOMY      wisdom teeth[      [facility] NONSPECIFIC PROCEDURE  1992    repair of TAA with graft    [facility] TOTAL HIP ARTHROPLASTY  2010    Left AMANDA    [facility] TOTAL HIP ARTHROPLASTY  2002    R hip replacement comp's by nerve injury with pain down into R leg, nadya below knee     Current providers sharing in care for this patient include:  Patient Care Team:  Herve Morgan MD as PCP - General (Internal Medicine - Pediatrics)  Herve Morgan MD as Assigned PCP  Brandy Bowser MD as Assigned Musculoskeletal Provider  Raquel Miles DO as Physician (Cardiovascular Disease)  Raquel Miles DO as Assigned Heart and Vascular Provider  Clari Miranda AuD as Audiologist (Audiology)  Jamal Andersen MD as MD (Urology)  Arlene Urbina PA-C as Assigned Surgical  "Provider    The following health maintenance items are reviewed in Epic and correct as of today:  Health Maintenance   Topic Date Due    ANNUAL REVIEW OF HM ORDERS  Never done    MIGRAINE ACTION PLAN  Never done    RSV VACCINE (1 - Risk 60-74 years 1-dose series) Never done    URINE DRUG SCREEN  07/28/2022    INFLUENZA VACCINE (1) 09/01/2024    COVID-19 Vaccine (6 - 2024-25 season) 09/01/2024    MEDICARE ANNUAL WELLNESS VISIT  09/15/2024    PHQ-9  03/16/2025    LIPID  03/19/2025    TSH W/FREE T4 REFLEX  04/25/2025    FALL RISK ASSESSMENT  09/16/2025    GLUCOSE  03/20/2027    ADVANCE CARE PLANNING  09/15/2028    COLORECTAL CANCER SCREENING  01/31/2030    DTAP/TDAP/TD IMMUNIZATION (3 - Td or Tdap) 12/07/2031    HEPATITIS C SCREENING  Completed    DEPRESSION ACTION PLAN  Completed    Pneumococcal Vaccine: 65+ Years  Completed    ZOSTER IMMUNIZATION  Completed    HPV IMMUNIZATION  Aged Out    MENINGITIS IMMUNIZATION  Aged Out    RSV MONOCLONAL ANTIBODY  Aged Out         Review of Systems  Constitutional, HEENT, cardiovascular, pulmonary, GI, , musculoskeletal, neuro, skin, endocrine and psych systems are negative, except as otherwise noted.     Objective    Exam  /87 (BP Location: Right arm, Patient Position: Sitting, Cuff Size: Adult Large)   Pulse 74   Temp 98.7  F (37.1  C) (Tympanic)   Resp 20   Ht 1.822 m (5' 11.75\")   Wt 146.6 kg (323 lb 1.6 oz)   SpO2 97%   BMI 44.13 kg/m     Estimated body mass index is 44.13 kg/m  as calculated from the following:    Height as of this encounter: 1.822 m (5' 11.75\").    Weight as of this encounter: 146.6 kg (323 lb 1.6 oz).    Physical Exam  GENERAL: alert and no distress  EYES: Eyes grossly normal to inspection, PERRL and conjunctivae and sclerae normal  HENT: ear canals and TM's normal, nose and mouth without ulcers or lesions  NECK: no adenopathy, no asymmetry, masses, or scars  RESP: lungs clear to auscultation - no rales, rhonchi or wheezes  CV: regular rate " and rhythm, normal S1 S2, no S3 or S4, no murmur, click or rub, no peripheral edema  ABDOMEN: soft, nontender, no hepatosplenomegaly, no masses and bowel sounds normal  MS: no gross musculoskeletal defects noted, no edema  SKIN: no suspicious lesions or rashes  NEURO: Normal strength and tone, mentation intact and speech normal  PSYCH: mentation appears normal, affect normal/bright        9/16/2024   Mini Cog   Clock Draw Score 2 Normal   3 Item Recall 3 objects recalled   Mini Cog Total Score 5                 Signed Electronically by: Herve Morgan MD    Answers submitted by the patient for this visit:  Patient Health Questionnaire (Submitted on 9/16/2024)  If you checked off any problems, how difficult have these problems made it for you to do your work, take care of things at home, or get along with other people?: Not difficult at all  PHQ9 TOTAL SCORE: 7

## 2024-09-30 ENCOUNTER — ANTICOAGULATION THERAPY VISIT (OUTPATIENT)
Dept: ANTICOAGULATION | Facility: CLINIC | Age: 69
End: 2024-09-30

## 2024-09-30 ENCOUNTER — LAB (OUTPATIENT)
Dept: LAB | Facility: CLINIC | Age: 69
End: 2024-09-30
Payer: COMMERCIAL

## 2024-09-30 DIAGNOSIS — I48.0 PAF (PAROXYSMAL ATRIAL FIBRILLATION) (H): ICD-10-CM

## 2024-09-30 DIAGNOSIS — Z95.2 AORTIC VALVE PROSTHESIS PRESENT: ICD-10-CM

## 2024-09-30 DIAGNOSIS — I48.0 PAF (PAROXYSMAL ATRIAL FIBRILLATION) (H): Primary | ICD-10-CM

## 2024-09-30 LAB — INR BLD: 4.2 (ref 0.9–1.1)

## 2024-09-30 PROCEDURE — 85610 PROTHROMBIN TIME: CPT

## 2024-09-30 PROCEDURE — 36416 COLLJ CAPILLARY BLOOD SPEC: CPT

## 2024-09-30 NOTE — PROGRESS NOTES
ANTICOAGULATION MANAGEMENT     Todd S Aschoff 68 year old male is on warfarin with supratherapeutic INR result. (Goal INR Other - see comment)    Recent labs: (last 7 days)     09/30/24  1309   INR 4.2*       ASSESSMENT     Source(s): Chart Review and Patient/Caregiver Call     Warfarin doses taken: Warfarin taken as instructed   Diet: No new diet changes identified  Medication/supplement changes: None noted  New illness, injury, or hospitalization: No  Signs or symptoms of bleeding or clotting: No  Previous result: Supratherapeutic  Additional findings: None       PLAN     Recommended plan for no diet, medication or health factor changes affecting INR     Dosing Instructions: partial hold then decrease your warfarin dose (2.9% change) with next INR in 2 weeks       Summary  As of 9/30/2024      Full warfarin instructions:  9/30: 5 mg; Otherwise 5 mg every Sun, Thu; 6.25 mg all other days   Next INR check:  10/14/2024               Telephone call with Nicko who verbalizes understanding and agrees to plan    Lab visit scheduled    Education provided: Please call back if any changes to your diet, medications or how you've been taking warfarin    Plan made with [facility] Pharmacist Jazmine Andrew RN  9/30/2024  Anticoagulation Clinic  [employer] for routing messages: tamanna GUERRERO  [facility] patient phone line: [phone]        _______________________________________________________________________     Anticoagulation Episode Summary       Current INR goal:  Other - see comment   TTR:  43.2% (11.9 mo)   Target end date:  Indefinite   Send INR reminders to:  AIDA GUERRERO    Indications    PAF (paroxysmal atrial fibrillation) (H) [I48.0]  Mechanical AV Replacement 1992 -- on Warfarin  [Z95.2]             Comments:  3/20/24-goal 2.8-3.5             Anticoagulation Care Providers       Provider Role Specialty Phone number    Obdulio Ingram MD Referring Internal Medicine [phone]    Herve Morgan  MD ARIEL Referring Internal Medicine - Pediatrics [phone]

## 2024-10-02 DIAGNOSIS — J30.1 SEASONAL ALLERGIC RHINITIS DUE TO POLLEN: Primary | ICD-10-CM

## 2024-10-02 RX ORDER — AZELASTINE HYDROCHLORIDE 137 UG/1
1 SPRAY, METERED NASAL 2 TIMES DAILY
Qty: 30 ML | Refills: 11 | Status: SHIPPED | OUTPATIENT
Start: 2024-10-02

## 2024-10-14 ENCOUNTER — LAB (OUTPATIENT)
Dept: LAB | Facility: CLINIC | Age: 69
End: 2024-10-14
Payer: COMMERCIAL

## 2024-10-14 ENCOUNTER — ANTICOAGULATION THERAPY VISIT (OUTPATIENT)
Dept: ANTICOAGULATION | Facility: CLINIC | Age: 69
End: 2024-10-14

## 2024-10-14 ENCOUNTER — ANCILLARY PROCEDURE (OUTPATIENT)
Dept: GENERAL RADIOLOGY | Facility: CLINIC | Age: 69
End: 2024-10-14
Attending: FAMILY MEDICINE
Payer: COMMERCIAL

## 2024-10-14 ENCOUNTER — OFFICE VISIT (OUTPATIENT)
Dept: URGENT CARE | Facility: URGENT CARE | Age: 69
End: 2024-10-14
Payer: COMMERCIAL

## 2024-10-14 VITALS
SYSTOLIC BLOOD PRESSURE: 153 MMHG | WEIGHT: 315 LBS | DIASTOLIC BLOOD PRESSURE: 92 MMHG | OXYGEN SATURATION: 95 % | HEART RATE: 66 BPM | BODY MASS INDEX: 43.58 KG/M2 | TEMPERATURE: 99.1 F

## 2024-10-14 DIAGNOSIS — M25.511 ACUTE PAIN OF RIGHT SHOULDER: ICD-10-CM

## 2024-10-14 DIAGNOSIS — I48.0 PAF (PAROXYSMAL ATRIAL FIBRILLATION) (H): ICD-10-CM

## 2024-10-14 DIAGNOSIS — Z95.2 AORTIC VALVE PROSTHESIS PRESENT: ICD-10-CM

## 2024-10-14 DIAGNOSIS — M25.511 ACUTE PAIN OF RIGHT SHOULDER: Primary | ICD-10-CM

## 2024-10-14 DIAGNOSIS — I48.0 PAF (PAROXYSMAL ATRIAL FIBRILLATION) (H): Primary | ICD-10-CM

## 2024-10-14 LAB — INR BLD: 5.9 (ref 0.9–1.1)

## 2024-10-14 PROCEDURE — 85610 PROTHROMBIN TIME: CPT

## 2024-10-14 PROCEDURE — 73030 X-RAY EXAM OF SHOULDER: CPT | Mod: TC | Performed by: INTERNAL MEDICINE

## 2024-10-14 PROCEDURE — 99214 OFFICE O/P EST MOD 30 MIN: CPT | Performed by: FAMILY MEDICINE

## 2024-10-14 PROCEDURE — 36416 COLLJ CAPILLARY BLOOD SPEC: CPT

## 2024-10-14 NOTE — PATIENT INSTRUCTIONS
Follow-up appointment with sports medicine by the end of the week for re-examination of shoulder      Stay in the sling other than to shower/bathe      Tylenol as needed for pain      If the radiologist notes any abnormalities not discussed at the visit today our team will call to discuss. A copy of the radiologist's interpretation of your imaging today will be available on [employer] within 1-2 business days, please call if you have questions.

## 2024-10-14 NOTE — PROGRESS NOTES
ANTICOAGULATION MANAGEMENT     Todd S Aschoff 68 year old male is on warfarin with supratherapeutic INR result. (Goal INR Other - see comment)    Recent labs: (last 7 days)     10/14/24  1343   INR 5.9*         ASSESSMENT     Source(s): Chart Review  Previous INR was Supratherapeutic  Medication, diet, health changes since last INR   Warfarin maintenance dose was decreased 3% at last visit  Urgent care today for right shoulder pain  [facility] Jazmine decreased his warfarin maintenance dose 6%-see calendar         PLAN     Unable to reach Nicko today.    Left message to hold warfarin tonight. Request call back for assessment.    Follow up required to confirm warfarin dose taken and assess for changes and discuss out of range result     Angely Smalls RN  10/14/2024  Anticoagulation Clinic  [facility] for routing messages: tamanna GUERRERO  [facility] patient phone line: [phone]

## 2024-10-14 NOTE — PROGRESS NOTES
Assessment & Plan     Acute pain of right shoulder  - XR Shoulder Right G/E 3 Views  - Orthopedic  Referral     Exam limited due to level of discomfort, he confirms limited ROM came from this injury and was not there previously. No obvious fractures per my read of shoulder films.    Referral to sports med to be seen in 3-5 days for follow-up exam to assess for possible rotator cuff injury nadya with prior hx of repair.     Shoulder sling was provided for comfort.     Patient with normal mentation and no signs of trauma to the head or neck, will defer CT imaging at this time.       Geraldo Barragan MD   [facility]    Obinna Maharaj is a 68 year old male who presents to clinic today for the following health issues:  Chief Complaint   Patient presents with     Fall     Start yesterday sx right shoulder pain-current-8/10 worst-10/10, hx torn right shoulder repair and hx of stroke in March  and bilateral upper back- current-7-8/10 worst-10/10 tx Tylenol      HPI    Patient reports moderate pain of his right shoulder after hitting his against a door this occurred after he slipped going down the stairs.  He reports no injuries to the head or neck.  No dizziness or lightheadedness experienced today.     He is right arm dominant.  No numbness of the hand.      Patient Active Problem List    Diagnosis Date Noted     Moderate dementia without behavioral disturbance, psychotic disturbance, mood disturbance, or anxiety, unspecified dementia type (H) 09/16/2024     Priority: Medium     Dementia without behavioral disturbance, psychotic disturbance, mood disturbance, or anxiety, unspecified dementia severity, unspecified dementia type (H) 09/16/2024     Priority: Medium     Right temporal lobe infarction (H) 10/31/2023     Priority: Medium     Left hemiparesis       Hearing loss of left ear, unspecified hearing loss type 10/31/2023     Priority: Medium     Benign essential hypertension 03/14/2023      Priority: Medium     Nonintractable epilepsy without status epilepticus, unspecified epilepsy type (H) 12/12/2022     Priority: Medium     Family hx of prostate cancer 12/07/2021     Priority: Medium     Hx of CVA x 4, 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 04/18/2021     Priority: Medium     Renal insufficiency 01/22/2021     Priority: Medium     Anemia, unspecified type 01/22/2021     Priority: Medium     Leg wound, left 01/09/2021     Priority: Medium     Hematoma from fall, followed by infection and skin graft       Depression, unspecified depression type 11/27/2020     Priority: Medium     History of suicide attempt       Elevated prostate specific antigen (PSA) 01/22/2020     Priority: Medium     Alcohol dependence in remission (H) 08/02/2018     Priority: Medium     S/P lumbar fusion 04/05/2018     Priority: Medium     Spinal stenosis of lumbar region 04/05/2018     Priority: Medium     Obesity, BMI >35 with comorbidities 04/28/2017     Priority: Medium     Bilateral thoracic back pain 11/16/2016     Priority: Medium     Valium 5 mg at bedtime for years, per patient.         OAB (overactive bladder) 05/11/2015     Priority: Medium     Rotator cuff tear 01/26/2015     Priority: Medium     Overview:   Rotator cuff tear, right  Formatting of this note might be different from the original.  Rotator cuff tear, right       Memory difficulties 01/16/2015     Priority: Medium     Hx of Thoracic Dissection repair -- 1992 04/22/2013     Priority: Medium     Lumbar radiculopathy 04/03/2013     Priority: Medium     Tourette syndrome 10/30/2012     Priority: Medium     BPH (benign prostatic hypertrophy)      Priority: Medium     Follows with urology.        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        Radiculitis 04/20/2010     Priority: Medium     Overview:   LW Modifier:  Right foot, s/p R AMANDA  LW  Onset:  2002  ; Neuralgia  Formatting of this note might be different from the original.  LW Modifier:  Right foot, s/p R MAANDA  LW Onset:  2002  ; Neuralgia       PAF (paroxysmal atrial fibrillation) (H) 08/07/2002     Priority: Medium     Hypothyroidism 08/07/2002     Priority: Medium     Problem list name updated by automated process. Provider to review         Current Outpatient Medications   Medication Sig Dispense Refill     acetaminophen (TYLENOL) 500 MG tablet Take 1,000 mg by mouth 2 times daily       atorvastatin (LIPITOR) 40 MG tablet Take 1 tablet (40 mg) by mouth every evening 90 tablet 3     azelastine 137 MCG/SPRAY SOLN Spray 1 spray into both nostrils 2 times daily 30 mL 11     cyclobenzaprine (FLEXERIL) 10 MG tablet Take 1 tablet (10 mg) by mouth 3 times daily as needed for muscle spasms 270 tablet 3     diazepam (VALIUM) 5 MG tablet Take 1 tablet (5 mg) by mouth nightly as needed for anxiety. 30 tablet 0     donepezil (ARICEPT) 10 MG tablet Take 1 tablet (10 mg) by mouth At Bedtime 90 tablet 3     folic acid (FOLVITE) 400 MCG tablet Take 400 mcg by mouth daily       guanFACINE (TENEX) 1 MG tablet Take 1 tablet by mouth At Bedtime 90 tablet 1     levothyroxine (SYNTHROID/LEVOTHROID) 175 MCG tablet Take 1 tablet (175 mcg) by mouth daily 90 tablet 3     lisinopril (ZESTRIL) 10 MG tablet Take 1 tablet (10 mg) by mouth daily. 90 tablet 3     mirabegron (MYRBETRIQ) 50 MG 24 hr tablet Take 1 tablet (50 mg) by mouth daily 90 tablet 3     pregabalin (LYRICA) 100 MG capsule Take 1 capsule (100 mg) by mouth 3 times daily. Do not take if sleepy/sedated. 270 capsule 3     propafenone (RYTHMOL) 150 MG TABS tablet Take 1 tablet (150 mg) by mouth every 8 hours 270 tablet 4     venlafaxine (EFFEXOR XR) 75 MG 24 hr capsule Take 3 capsules (225 mg) by mouth daily 270 capsule 3     warfarin ANTICOAGULANT (COUMADIN) 5 MG tablet Take 5mg every Wed & Sat / Take 7.5mg all other days OR per INR clinic 120 tablet 0     No  current facility-administered medications for this visit.           Objective    BP (!) 153/92   Pulse 66   Temp 99.1  F (37.3  C)   Wt 144.7 kg (319 lb 1.6 oz)   SpO2 95%   BMI 43.58 kg/m    Physical Exam       CV: HDS  Shoulder: pain with resisted external rotation right side, elevation/abduction is limited beyond 90 degrees. Generalized shoulder anterior pain. Extension R sided limited due to discomfort ( left side can reach lower lumbar with intact subscapularis   5/5  strength R hand, intact sensation  GEN: normal mentation, no ataxia  Head: racoon's/mendez's negative  Results for orders placed or performed in visit on 10/14/24   XR Shoulder Right G/E 3 Views     Status: None    Narrative    XR SHOULDER RIGHT G/E 3 VIEWS  10/14/2024 3:10 PM     HISTORY: R shoulder pain after slipping on stairs and hitting shoulder  against door; Acute pain of right shoulder  COMPARISON: MRI 5/18/2022      Impression    IMPRESSION:  No acute fracture or subluxation. Mild acromioclavicular and  glenohumeral joint osteoarthritis. Channels likely related to rotator  cuff repair. Partially imaged anterior instrumented cervical fusion.  Median sternotomy wires.    HUMERA COLVIN MD         SYSTEM ID:  [id number]                                              The use of Dragon/twiDAQ dictation services may have been used to construct the content in this note; any grammatical or spelling errors are non-intentional. Please contact the author of this note directly if you are in need of any clarification.

## 2024-10-15 NOTE — PROGRESS NOTES
ANTICOAGULATION MANAGEMENT     Todd S Aschoff 68 year old male is on warfarin with supratherapeutic INR result. (Goal INR Other - see comment)    Recent labs: (last 7 days)     10/14/24  1343   INR 5.9*       ASSESSMENT     Source(s): Chart Review and Patient/Caregiver Call     Warfarin doses taken: Warfarin taken as instructed  Diet: No new diet changes identified  Medication/supplement changes: None noted  New illness, injury, or hospitalization:  10/14/24 for shoulder pain related to fall. Has not been using acetaminophen because he knows it will raise his INR  Signs or symptoms of bleeding or clotting: No  Previous result: Supratherapeutic  Additional findings: None       PLAN     Recommended plan for ongoing change(s) affecting INR     Dosing Instructions: hold dose then decrease your warfarin dose (6.1% change) with next INR in 3 days       Summary  As of 10/14/2024      Full warfarin instructions:  10/14: Hold; Otherwise 6.25 mg every Mon, Wed, Fri; 5 mg all other days   Next INR check:  10/17/2024               Telephone call with Nicko who verbalizes understanding and agrees to plan    Lab visit scheduled    Education provided: Please call back if any changes to your diet, medications or how you've been taking warfarin    Plan made with [facility] Pharmacist Jazmine Gamboa RN  10/15/2024  Anticoagulation Clinic  [employer] for routing messages: tamanna GUERRERO  [facility] patient phone line: [phone]        _______________________________________________________________________     Anticoagulation Episode Summary       Current INR goal:  Other - see comment   TTR:  39.1% (11.8 mo)   Target end date:  Indefinite   Send INR reminders to:  AIDA GUERRERO    Indications    PAF (paroxysmal atrial fibrillation) (H) [I48.0]  Mechanical AV Replacement 1992 -- on Warfarin  [Z95.2]             Comments:  3/20/24-goal 2.8-3.5             Anticoagulation Care Providers       Provider Role Specialty  Phone number    Obdulio Ingram MD Referring Internal Medicine [phone]    Herve Morgan MD Referring Internal Medicine - Pediatrics [phone]

## 2024-10-17 ENCOUNTER — TELEPHONE (OUTPATIENT)
Dept: NURSING | Facility: CLINIC | Age: 69
End: 2024-10-17

## 2024-10-17 ENCOUNTER — ANTICOAGULATION THERAPY VISIT (OUTPATIENT)
Dept: ANTICOAGULATION | Facility: CLINIC | Age: 69
End: 2024-10-17

## 2024-10-17 ENCOUNTER — LAB (OUTPATIENT)
Dept: LAB | Facility: CLINIC | Age: 69
End: 2024-10-17
Payer: COMMERCIAL

## 2024-10-17 DIAGNOSIS — Z95.2 AORTIC VALVE PROSTHESIS PRESENT: ICD-10-CM

## 2024-10-17 DIAGNOSIS — I48.0 PAF (PAROXYSMAL ATRIAL FIBRILLATION) (H): Primary | ICD-10-CM

## 2024-10-17 DIAGNOSIS — I48.0 PAF (PAROXYSMAL ATRIAL FIBRILLATION) (H): ICD-10-CM

## 2024-10-17 LAB — INR BLD: 3.5 (ref 0.9–1.1)

## 2024-10-17 PROCEDURE — 36416 COLLJ CAPILLARY BLOOD SPEC: CPT

## 2024-10-17 PROCEDURE — 85610 PROTHROMBIN TIME: CPT

## 2024-10-17 NOTE — PROGRESS NOTES
ANTICOAGULATION MANAGEMENT     Todd S Aschoff 68 year old male is on warfarin with therapeutic INR result. (Goal INR  2.8-3.5 )    Recent labs: (last 7 days)     10/17/24  1624   INR 3.5*       ASSESSMENT     Source(s): Chart Review  Previous INR was Supratherapeutic  Medication, diet, health changes since last INR chart reviewed; none identified         PLAN     Unable to reach Nicko today.    Left message to continue current dose of warfarin 5 mg tonight. Request call back for assessment. Left message to call [phone].       Follow up required to confirm warfarin dose taken and assess for changes    Warfarin dosing discussed with [facility] Pharmacist Luz Maria Stahl. Will continue current warfarin maintenance dose for temporary factors, recheck 10/23, 10/24 or 10/25 next week.     Lucina Andrew RN  10/17/2024  Anticoagulation Clinic  [facility] for routing messages: tamanna GUERRERO  [facility] patient phone line: [phone]

## 2024-10-17 NOTE — TELEPHONE ENCOUNTER
Pt is calling wondering what dose of his Warfarin he should be taking. Writer can see that a message was left for pt by the [facility] and reads as follow.     Unable to reach Nicko today.     Left message to continue current dose of warfarin 5 mg tonight. Request call back for assessment. Left message to call [phone].        Follow up required to confirm warfarin dose taken and assess for changes     Warfarin dosing discussed with [facility] Pharmacist Luz Maria Stahl. Will continue current warfarin maintenance dose for temporary factors, recheck 10/23, 10/24 or 10/25 next week.      Lucina Andrew RN  10/17/2024  Anticoagulation Clinic  [employer] for routing messages: tamanna GUERRERO  [facility] patient phone line: [phone]     No triage     Radha Thakkar RN  [facility] Nurse Advisor  6:07 PM 10/17/2024

## 2024-10-18 NOTE — PROGRESS NOTES
ANTICOAGULATION MANAGEMENT     Todd S Aschoff 68 year old male is on warfarin with therapeutic INR result. (Goal INR  2.8-3.5 )    Recent labs: (last 7 days)     10/17/24  1624   INR 3.5*       ASSESSMENT     Source(s): Chart Review and Patient/Caregiver Call     Warfarin doses taken: Held for 1 day  recently which may be affecting INR  Diet: No new diet changes identified  Medication/supplement changes: None noted  New illness, injury, or hospitalization: No  Signs or symptoms of bleeding or clotting: No  Previous result: Supratherapeutic  Additional findings: Warfarin maintenance dose was decreased 6% at last visit       PLAN     Recommended plan for temporary change(s) affecting INR     Dosing Instructions: Continue your current warfarin dose with next INR in 1 week       Summary  As of 10/17/2024      Full warfarin instructions:  6.25 mg every Mon, Wed, Fri; 5 mg all other days   Next INR check:  10/24/2024               Telephone call with Nicko who verbalizes understanding and agrees to plan    Lab visit scheduled    Education provided: Taking warfarin: Importance of taking warfarin as instructed  Goal range and lab monitoring: goal range and significance of current result    Plan made per [facility] anticoagulation protocol    Angely Smalls RN  10/18/2024  Anticoagulation Clinic  [employer] for routing messages: tamanna GUERRERO  [facility] patient phone line: [phone]        _______________________________________________________________________     Anticoagulation Episode Summary       Current INR goal:  Other - see comment   TTR:  38.2% (11.8 mo)   Target end date:  Indefinite   Send INR reminders to:  AIDA GUERRERO    Indications    PAF (paroxysmal atrial fibrillation) (H) [I48.0]  Mechanical AV Replacement 1992 -- on Warfarin  [Z95.2]             Comments:  3/20/24-goal 2.8-3.5             Anticoagulation Care Providers       Provider Role Specialty Phone number    Obdulio Ingram MD Referring Internal Medicine  [phone]    Herve Morgan MD Referring Internal Medicine - Pediatrics [phone]

## 2024-10-18 NOTE — PROGRESS NOTES
Left voicemail to inform patient that his chart will be closed since this is 3rd attempt to reach patient.  Writer advised to continue on current warfarin maintenance dose and to call to schedule INR on 10/24/24=1 week from INR on 10/17/24.    Angely Smalls RN  Anticoagulation Clinic

## 2024-10-21 ENCOUNTER — TELEPHONE (OUTPATIENT)
Dept: NEUROLOGY | Facility: CLINIC | Age: 69
End: 2024-10-21
Payer: COMMERCIAL

## 2024-10-21 DIAGNOSIS — M62.830 BACK MUSCLE SPASM: ICD-10-CM

## 2024-10-21 RX ORDER — DIAZEPAM 5 MG/1
5 TABLET ORAL
Qty: 30 TABLET | Refills: 0 | Status: SHIPPED | OUTPATIENT
Start: 2024-10-21

## 2024-10-22 NOTE — TELEPHONE ENCOUNTER
Patient is overdue for INR check.  Spoke to patient by phone.  INR check scheduled at lab today.    Information given to patient:  In response to the COVID-19 pandemic, the Anticoagulation Clinic ([facility]) program is making changes to keep your safety our top priority.      Warfarin monitoring is important, and we are doing the following to make your monitoring safe:  1. Lab will be doing your fingerstick, to minimize your time in clinic  2. Your result will be routed to a trained ACC nurse or pharmacist  3. An [facility] nurse or pharmacist will call you to review your results, review the questions we normally ask you in clinic, and give you dosing and a next recheck date.     Nova John RN  [facility]    
Patient/Caregiver provided printed discharge information.

## 2024-10-23 ENCOUNTER — TELEPHONE (OUTPATIENT)
Dept: CARDIOLOGY | Facility: CLINIC | Age: 69
End: 2024-10-23
Payer: COMMERCIAL

## 2024-10-23 DIAGNOSIS — I48.20 CHRONIC ATRIAL FIBRILLATION (H): ICD-10-CM

## 2024-10-23 RX ORDER — PROPAFENONE HYDROCHLORIDE 150 MG/1
150 TABLET, COATED ORAL EVERY 8 HOURS
Qty: 270 TABLET | Refills: 0 | Status: SHIPPED | OUTPATIENT
Start: 2024-10-23

## 2024-10-25 ENCOUNTER — LAB (OUTPATIENT)
Dept: LAB | Facility: CLINIC | Age: 69
End: 2024-10-25
Payer: COMMERCIAL

## 2024-10-25 ENCOUNTER — ANTICOAGULATION THERAPY VISIT (OUTPATIENT)
Dept: ANTICOAGULATION | Facility: CLINIC | Age: 69
End: 2024-10-25

## 2024-10-25 DIAGNOSIS — I48.0 PAF (PAROXYSMAL ATRIAL FIBRILLATION) (H): ICD-10-CM

## 2024-10-25 DIAGNOSIS — Z95.2 AORTIC VALVE PROSTHESIS PRESENT: ICD-10-CM

## 2024-10-25 DIAGNOSIS — I48.0 PAF (PAROXYSMAL ATRIAL FIBRILLATION) (H): Primary | ICD-10-CM

## 2024-10-25 LAB — INR BLD: 3.1 (ref 0.9–1.1)

## 2024-10-25 PROCEDURE — 36415 COLL VENOUS BLD VENIPUNCTURE: CPT

## 2024-10-25 PROCEDURE — 85610 PROTHROMBIN TIME: CPT

## 2024-10-25 NOTE — PROGRESS NOTES
ANTICOAGULATION MANAGEMENT     Todd S Aschoff 68 year old male is on warfarin with therapeutic INR result. (Goal INR 2.8-3.5)    Recent labs: (last 7 days)     10/25/24  1356   INR 3.1*       ASSESSMENT     Source(s): Chart Review and Patient/Caregiver Call     Warfarin doses taken: Warfarin taken as instructed  Diet: No new diet changes identified  Medication/supplement changes: None noted  New illness, injury, or hospitalization: No  Signs or symptoms of bleeding or clotting: No  Previous result: Therapeutic last visit; previously outside of goal range  Additional findings: None       PLAN     Recommended plan for no diet, medication or health factor changes affecting INR     Dosing Instructions: Continue your current warfarin dose with next INR in 2 weeks       Summary  As of 10/25/2024      Full warfarin instructions:  6.25 mg every Mon, Wed, Fri; 5 mg all other days   Next INR check:  11/22/2024               Telephone call with Nicko who agrees to plan and repeated back plan correctly    Lab visit scheduled    Education provided: Please call back if any changes to your diet, medications or how you've been taking warfarin    Plan made per [facility] anticoagulation protocol    Amanda Wheeler RN  10/25/2024  Anticoagulation Clinic  [id number] for routing messages: tamanna GUERRERO  [facility] patient phone line: [phone]        _______________________________________________________________________     Anticoagulation Episode Summary       Current INR goal:  Other - see comment   TTR:  36.2% (11.9 mo)   Target end date:  Indefinite   Send INR reminders to:  AIDA GUERRERO    Indications    PAF (paroxysmal atrial fibrillation) (H) [I48.0]  Mechanical AV Replacement 1992 -- on Warfarin  [Z95.2]             Comments:  3/20/24-goal 2.8-3.5             Anticoagulation Care Providers       Provider Role Specialty Phone number    Obdulio Ingram MD Referring Internal Medicine [phone]    Herve Morgan MD Referring Internal  Medicine - Pediatrics [phone]

## 2024-10-31 ENCOUNTER — TELEPHONE (OUTPATIENT)
Dept: GASTROENTEROLOGY | Facility: CLINIC | Age: 69
End: 2024-10-31
Payer: COMMERCIAL

## 2024-10-31 DIAGNOSIS — Z12.11 SPECIAL SCREENING FOR MALIGNANT NEOPLASMS, COLON: Primary | ICD-10-CM

## 2024-10-31 NOTE — TELEPHONE ENCOUNTER
"Endoscopy Scheduling Screen    Have you had any respiratory illness or flu-like symptoms in the last 10 days?  No    What is your communication preference for Instructions and/or Bowel Prep?   MyChart    What insurance is in the chart?  Other:  [employer]    Ordering/Referring Provider:     CHUCK COPPOLA      (If ordering provider performs procedure, schedule with ordering provider unless otherwise instructed. )    BMI: Estimated body mass index is 43.58 kg/m  as calculated from the following:    Height as of 9/16/24: 1.822 m (5' 11.75\").    Weight as of 10/14/24: 144.7 kg (319 lb 1.6 oz).     Sedation Ordered  moderate sedation.   If patient BMI > 50 do not schedule in [facility].    If patient BMI > 45 do not schedule at [facility].    Are you taking methadone or Suboxone?  NO, No RN review required.    Have you been diagnosed and are being treated for severe PTSD or severe anxiety?  NO, No RN review required.    Are you taking any prescription medications for pain 3 or more times per week?   NO, No RN review required.    Do you have a history of malignant hyperthermia?  No    (Females) Are you currently pregnant?        Have you been diagnosed or told you have pulmonary hypertension?   No    Do you have an LVAD?  No    Have you been told you have moderate to severe sleep apnea?  No.    Have you been told you have COPD, asthma, or any other lung disease?  No    Do you have any heart conditions?  No     Have you ever had or are you waiting for an organ transplant?  No. Continue scheduling, no site restrictions.    Have you had a stroke or transient ischemic attack (TIA aka \"mini stroke\" in the last 6 months?   No    Have you been diagnosed with or been told you have cirrhosis of the liver?   No.    Are you currently on dialysis?   No    Do you need assistance transferring?   No    BMI: Estimated body mass index is 43.58 kg/m  as calculated from the following:    Height as of 9/16/24: 1.822 m (5' 11.75\").    Weight as of 10/14/24: " 144.7 kg (319 lb 1.6 oz).     Is patients BMI > 40 and scheduling location [facility]?  No    Do you take an injectable or oral medication for weight loss or diabetes (excluding insulin)?  No    Do you take the medication Naltrexone?  No    Do you take blood thinners?  Yes, you must contact your prescribing provider for directions on holding or bridging with a different medication.       Prep   Are you currently on dialysis or do you have chronic kidney disease?  No    Do you have a diagnosis of diabetes?  No    Do you have a diagnosis of cystic fibrosis (CF)?  No    On a regular basis do you go 3 -5 days between bowel movements?  No    BMI > 40?  Yes (Extended Prep)    Preferred Pharmacy:    [id number] - [place], [place] - [address]  [address] [address]  Phone: [phone] Fax: [phone]      Final Scheduling Details     Procedure scheduled  Colonoscopy    Surgeon:  HARINDER     Date of procedure:  11/15     Pre-OP / PAC:   No - Not required for this site.    Location  [facility] - Per order.    Sedation   Moderate Sedation - Per order.      Patient Reminders:   You will receive a call from a Nurse to review instructions and health history.  This assessment must be completed prior to your procedure.  Failure to complete the Nurse assessment may result in the procedure being cancelled.      On the day of your procedure, please designate an adult(s) who can drive you home stay with you for the next 24 hours. The medicines used in the exam will make you sleepy. You will not be able to drive.      You cannot take public transportation, ride share services, or non-medical taxi service without a responsible caregiver.  Medical transport services are allowed with the requirement that a responsible caregiver will receive you at your destination.  We require that drivers and caregivers are confirmed prior to your procedure.

## 2024-11-01 ENCOUNTER — TELEPHONE (OUTPATIENT)
Dept: GASTROENTEROLOGY | Facility: CLINIC | Age: 69
End: 2024-11-01
Payer: MEDICARE

## 2024-11-01 NOTE — TELEPHONE ENCOUNTER
Patient needs MAC at  until at least 3/20/26 for all procedures. Currently last stroke was 3/2024. This was patient's 4th stroke so this date may change if patient has another.    Sent to scheduling to reschedule.    Marianne Andujar RN  Endoscopy Procedure Pre Assessment RN  [phone] option 2

## 2024-11-01 NOTE — TELEPHONE ENCOUNTER
Caller: Writer to pt     Reason for Reschedule/Cancellation   (please be detailed, any staff messages or encounters to note?): pt needs MAC if at  or CS at other locations. Recent stroke in March     Please scheduled 2 weeks out and inform Marianne Anudjar RN of  or [facility] so she can send for anesthesia review      Prior to reschedule please review:  Ordering Provider: Herve Morgan MD  Sedation Determined: MAC if [facility] CS other hospitals  Does patient have any ASC Exclusions, please identify?: Yes, Recent stroke      Notes on Cancelled Procedure:  Procedure: Lower Endoscopy [Colonoscopy]   Date: 11/15/2024  Location: [facility]; [facility] [address] [place] [address]  Surgeon: HARINDER      Rescheduled: No, LVM for pt to reschedule CASE IN DEPOT       Did you cancel or rescheduled an EUS procedure? No.

## 2024-11-01 NOTE — TELEPHONE ENCOUNTER
Message sent to SHYANN Andujar    Rescheduled: Yes,   Procedure: Lower Endoscopy [Colonoscopy]    Date: 12/3/24 - offered earlier MAC at [facility] - Pt declined  Location: [facility]; [address] [place] [address] [place] [address]    Surgeon: NYASIA   Sedation Level Scheduled  moderate ,  Reason for Sedation Level Per order   Instructions updated and sent: y,mychart     Does patient need PAC or Pre -Op Rescheduled? : no       Did you cancel or rescheduled an EUS procedure? No.

## 2024-11-02 DIAGNOSIS — Z95.2 AORTIC VALVE PROSTHESIS PRESENT: ICD-10-CM

## 2024-11-02 DIAGNOSIS — I48.0 PAF (PAROXYSMAL ATRIAL FIBRILLATION) (H): ICD-10-CM

## 2024-11-02 DIAGNOSIS — I63.9 CEREBROVASCULAR ACCIDENT (CVA), UNSPECIFIED MECHANISM (H): ICD-10-CM

## 2024-11-04 RX ORDER — WARFARIN SODIUM 5 MG/1
TABLET ORAL
Qty: 130 TABLET | Refills: 1 | Status: SHIPPED | OUTPATIENT
Start: 2024-11-04

## 2024-11-04 NOTE — TELEPHONE ENCOUNTER
"Writer sent for review to  as they did not follow up with cardiology as indicated.    Addendum: Received response from Annette at  \"He is fine to schedule his procedure here now, he will need to bridge with Lovenox.  I don t think he needs MAC.\"    Marianne Andujar RN  Endoscopy Procedure Pre Assessment RN  [phone] option 2    "

## 2024-11-04 NOTE — TELEPHONE ENCOUNTER
ANTICOAGULATION MANAGEMENT:  Medication Refill    Anticoagulation Summary  As of 10/25/2024      Warfarin maintenance plan:  6.25 mg (5 mg x 1 and 2.5 mg x 0.5) every Mon, Wed, Fri; 5 mg (5 mg x 1) all other days   Next INR check:  11/22/2024   Target end date:  Indefinite    Indications    PAF (paroxysmal atrial fibrillation) (H) [I48.0]  Mechanical AV Replacement 1992 -- on Warfarin  [Z95.2]                 Anticoagulation Care Providers       Provider Role Specialty Phone number    Obdulio Ingram MD Referring Internal Medicine [phone]    Herve Morgan MD Referring Internal Medicine - Pediatrics [phone]            Refill Criteria    Visit with referring provider/group: Meets criteria: visit within referring provider group in the last 15 months on 9/16/24    [facility] referral last signed: 03/21/2024; within last year: Yes    Lab monitoring not exceeding 2 weeks overdue: Yes    Nicko meets all criteria for refill. Rx instructions and quantity supplied updated to match patient's current dosing plan. Warfarin 90 day supply with 1 refill granted per [facility] protocol     Marianne Andrew RN  Anticoagulation Clinic

## 2024-11-08 ENCOUNTER — LAB (OUTPATIENT)
Dept: LAB | Facility: CLINIC | Age: 69
End: 2024-11-08
Payer: MEDICARE

## 2024-11-08 ENCOUNTER — ANTICOAGULATION THERAPY VISIT (OUTPATIENT)
Dept: ANTICOAGULATION | Facility: CLINIC | Age: 69
End: 2024-11-08

## 2024-11-08 ENCOUNTER — TELEPHONE (OUTPATIENT)
Dept: ANTICOAGULATION | Facility: CLINIC | Age: 69
End: 2024-11-08

## 2024-11-08 DIAGNOSIS — I48.0 PAF (PAROXYSMAL ATRIAL FIBRILLATION) (H): Primary | ICD-10-CM

## 2024-11-08 DIAGNOSIS — I63.9 CEREBROVASCULAR ACCIDENT (CVA), UNSPECIFIED MECHANISM (H): ICD-10-CM

## 2024-11-08 DIAGNOSIS — Z95.2 AORTIC VALVE PROSTHESIS PRESENT: ICD-10-CM

## 2024-11-08 DIAGNOSIS — I48.0 PAF (PAROXYSMAL ATRIAL FIBRILLATION) (H): ICD-10-CM

## 2024-11-08 LAB — INR BLD: 1.9 (ref 0.9–1.1)

## 2024-11-08 PROCEDURE — 36416 COLLJ CAPILLARY BLOOD SPEC: CPT

## 2024-11-08 PROCEDURE — 85610 PROTHROMBIN TIME: CPT

## 2024-11-08 RX ORDER — ENOXAPARIN SODIUM 150 MG/ML
0.75 INJECTION SUBCUTANEOUS EVERY 12 HOURS
Qty: 22.4 ML | Refills: 1 | Status: CANCELLED
Start: 2024-11-08

## 2024-11-08 NOTE — PROGRESS NOTES
"Estimated body mass index is 43.58 kg/m  as calculated from the following:    Height as of 9/16/24: 1.822 m (5' 11.75\").    Weight as of 10/14/24: 144.7 kg (319 lb 1.6 oz).  Estimated Creatinine Clearance: 114.7 mL/min (based on SCr of 0.92 mg/dL).    INR > 1 point below goal range, h/o CVA x 4; advise bridging with enoxaparin 105 mg Q12h until INR > /= 2.8.     Jazmine Felipe, VanD, BCPS    "

## 2024-11-08 NOTE — PROGRESS NOTES
ANTICOAGULATION MANAGEMENT     Todd S Aschoff 68 year old male is on warfarin with subtherapeutic INR result. (Goal INR Other - see comment)    Recent labs: (last 7 days)     24  1130   INR 1.9*       ASSESSMENT     Source(s): Chart Review and Patient/Caregiver Call     Warfarin doses taken: Warfarin taken as instructed  Diet:  Started drinking V8 daily the past several weeks. He really likes V8 but electing to switch to orange juice so as not to have INR influence. Advised he could continue if able to remain consistent (ongoing factor) but prefers to switch so considering temp factor today.   Medication/supplement changes: None noted - continues tylenol 2,000 mg daily for ongoing back pain  New illness, injury, or hospitalization: No  Signs or symptoms of bleeding or clotting: No  Previous result: Therapeutic last 2(+) visits  Additional findings: Upcoming surgery/procedure - 12/3/24 colonoscopy. Sending TE to [facility] today.     Huddled with [facility], able to utilize current lovenox syringes that patient has on hand. He confirmed they are not . Gave specific detailed instructions about expelling lovenox in order to administer 100 mg q 12 hours.         PLAN     Recommended plan for temporary change(s) affecting INR     Dosing Instructions: booster dose then continue your current warfarin dose Start bridging with Enoxaparin with next INR in 4 days       Summary  As of 2024      Full warfarin instructions:  : 11.25 mg; Otherwise 6.25 mg every Mon, Wed, Fri; 5 mg all other days   Next INR check:  2024               Telephone call with Nicko who verbalizes understanding and agrees to plan    Lab visit scheduled    Education provided: Please call back if any changes to your diet, medications or how you've been taking warfarin  Dietary considerations: importance of consistent vitamin K intake, impact of vitamin K foods on INR, and importance of notifying [facility] to changes in diet  Lovenox/Heparin education  provided: role of enoxaparin/heparin in bridge therapy and adjusting syringe/pushing out medication to obtain prescribed dose     Plan made with [facility] Pharmacist Jazmine Andrew RN  11/8/2024  Anticoagulation Clinic  [facility] for routing messages: tamanna GUERRERO  [facility] patient phone line: [phone]        _______________________________________________________________________     Anticoagulation Episode Summary       Current INR goal:  Other - see comment   TTR:  37.5% (12 mo)   Target end date:  Indefinite   Send INR reminders to:  AIDA GUERRERO    Indications    PAF (paroxysmal atrial fibrillation) (H) [I48.0]  Mechanical AV Replacement 1992 -- on Warfarin  [Z95.2]             Comments:  3/20/24-goal 2.8-3.5             Anticoagulation Care Providers       Provider Role Specialty Phone number    Obdulio Ingram MD Referring Internal Medicine [phone]    Herve Morgan MD Referring Internal Medicine - Pediatrics [phone]

## 2024-11-08 NOTE — TELEPHONE ENCOUNTER
[facility]-PROCEDURAL ANTICOAGULATION  MANAGEMENT    Nicko requesting pre-procedure hold orders for warfarin and review for bridging      Procedure date: 12/3/24       Procedure: Colonoscopy      Procedure location and phone number (if external): [facility]     Number of warfarin hold days requested and/or target INR: 5 days    Pre-op date: Not applicable      Routing to Anticoagulation Pharmacist for review.     [facility] pool: tamanna Andrew RN

## 2024-11-08 NOTE — PROGRESS NOTES
Patient has 100 mg and 150 mg syringes in home. Will reduce enoxaparin dose to 100 mg subcutaneous Q12h to allow for use of available syringes.     Jazmine Felipe, Kvng, BCPS

## 2024-11-08 NOTE — TELEPHONE ENCOUNTER
[facility]-PROCEDURAL ANTICOAGULATION  MANAGEMENT    ASSESSMENT     Warfarin interruption plan for colonoscopy on 12/3/24.    Indication for Anticoagulation: Atrial Fibrillation, Mechanical AVR, and Stroke x 4 (last 3/18/24)    INR goal range 2.8-3.5  Polypectomy (11-15 mm) with 2020 colonoscopy    [facility]e-Procedure Risk stratification for thromboembolism: moderate (2022 Chest guidelines)    AVR/MVR: 2022 Chest Perioperative Management guideline suggests no bridging for mechanical heart valves except select patients at high thromboembolic risk such as with an older-generation mechanical heart valve, MVR with one more more risk factors for thromboembolism, a recent (within 3 months) thromboembolic event, or with prior perioperative stroke    RECOMMENDATION     Pre-Procedure:  Hold warfarin for 5 days, until after procedure starting: Thursday, November 28   Enoxaparin (Lovenox) 105 mg subq Q 12 hrs (0.75 mg/kg Q 12 hrs for BMI >= 40 kg/m2 per [facility] P&T approved dose adjustment protocol)   Start enoxaparin: Will need to evaluate INR the week of 11/25 to determine start date (tentatively, Friday, November 28 in PM)  Last dose of enoxaparin prior to procedure: Monday, December 2 in AM  (~24 hours prior to procedure)  Check LMWH Anti-Xa on 12/2: 4 to 6 hours after last injection    Post-Procedure:  Resume warfarin dose if okay with provider doing procedure on night of procedure, Tuesday, December 3 PM: take 10 mg x 2, then resume home dose  Resume enoxaparin (Lovenox) ~ 24 hrs post procedure if no polyps removed or 48 hours if polypectomy performed when okay with provider doing procedure. Continue until INR >= 2.5  Recheck INR 5-7 days after resuming warfarin   ?     Plan routed to referring provider for approval  ?   Jazmine Felipe RPH    SUBJECTIVE/OBJECTIVE     Nicko S Huiru, a 68 year old male      Wt Readings from Last 3 Encounters:   10/14/24 144.7 kg (319 lb 1.6 oz)   09/16/24 146.6 kg (323 lb 1.6 oz)  "  08/01/24 144.2 kg (317 lb 12.8 oz)      Ideal body weight: 77 kg (169 lb 12.9 oz)  Adjusted ideal body weight: 104.1 kg (229 lb 8.4 oz)     Estimated body mass index is 43.58 kg/m  as calculated from the following:    Height as of 9/16/24: 1.822 m (5' 11.75\").    Weight as of 10/14/24: 144.7 kg (319 lb 1.6 oz).    Lab Results   Component Value Date    INR 1.9 (H) 11/08/2024    INR 3.1 (H) 10/25/2024    INR 3.5 (H) 10/17/2024     Lab Results   Component Value Date    HGB 15.8 03/18/2024    HCT 46.6 03/18/2024     03/18/2024     Lab Results   Component Value Date    CR 0.92 09/16/2024    CR 1.01 03/18/2024    CR 1.00 11/03/2023     Estimated Creatinine Clearance: 114.7 mL/min (based on SCr of 0.92 mg/dL).    "

## 2024-11-12 ENCOUNTER — LAB (OUTPATIENT)
Dept: LAB | Facility: CLINIC | Age: 69
End: 2024-11-12
Payer: MEDICARE

## 2024-11-12 ENCOUNTER — ANTICOAGULATION THERAPY VISIT (OUTPATIENT)
Dept: ANTICOAGULATION | Facility: CLINIC | Age: 69
End: 2024-11-12

## 2024-11-12 DIAGNOSIS — Z95.2 AORTIC VALVE PROSTHESIS PRESENT: ICD-10-CM

## 2024-11-12 DIAGNOSIS — I48.0 PAF (PAROXYSMAL ATRIAL FIBRILLATION) (H): ICD-10-CM

## 2024-11-12 DIAGNOSIS — I48.0 PAF (PAROXYSMAL ATRIAL FIBRILLATION) (H): Primary | ICD-10-CM

## 2024-11-12 LAB — INR BLD: 2.8 (ref 0.9–1.1)

## 2024-11-12 PROCEDURE — 36416 COLLJ CAPILLARY BLOOD SPEC: CPT

## 2024-11-12 PROCEDURE — 85610 PROTHROMBIN TIME: CPT

## 2024-11-12 RX ORDER — ENOXAPARIN SODIUM 150 MG/ML
0.75 INJECTION SUBCUTANEOUS EVERY 12 HOURS
Qty: 22.4 ML | Refills: 1 | Status: SHIPPED | OUTPATIENT
Start: 2024-11-12

## 2024-11-12 NOTE — TELEPHONE ENCOUNTER
Briefly reviewed hold plan with patient.  He is aware that he will need to hold warfarin for 5 days before the procedure and that next INR on 11/26 will determine when he will need to start the enoxaparin.  Rx for enoxaparin was sent in today.

## 2024-11-12 NOTE — PROGRESS NOTES
ANTICOAGULATION MANAGEMENT     Todd S Aschoff 68 year old male is on warfarin with therapeutic INR result. (Goal INR 2.8-3.5)    Recent labs: (last 7 days)     11/12/24  1129   INR 2.8*       ASSESSMENT     Source(s): Chart Review and Patient/Caregiver Call     Warfarin doses taken: Warfarin taken as instructed.  Also used Lovenox bridging as instructed.  Diet: Stopped drinking V8 juice.  Medication/supplement changes: None noted  New illness, injury, or hospitalization: No  Signs or symptoms of bleeding or clotting: No  Previous result: Subtherapeutic  Additional findings: Upcoming surgery/procedure colonoscopy on 12/3/24       PLAN     Recommended plan for no diet, medication or health factor changes affecting INR     Dosing Instructions: Increase your warfarin dose (6.5% change) with next INR in 2 weeks       Summary  As of 11/12/2024      Full warfarin instructions:  11/28: Hold; 11/29: Hold; 11/30: Hold; 12/1: Hold; 12/2: Hold; 12/3: 10 mg; 12/4: 10 mg; Otherwise 5 mg every Sun, Thu; 6.25 mg all other days   Next INR check:  11/26/2024               Telephone call with Nicko who agrees to plan and repeated back plan correctly    Lab visit scheduled    Education provided: Please call back if any changes to your diet, medications or how you've been taking warfarin    Plan made with [facility] Pharmacist Jazmine Wheeler RN  11/12/2024  Anticoagulation Clinic  [facility] for routing messages: tamanna GUERRERO  [facility] patient phone line: [phone]        _______________________________________________________________________     Anticoagulation Episode Summary       Current INR goal:  Other - see comment   TTR:  38.5% (12 mo)   Target end date:  Indefinite   Send INR reminders to:  AIDA GUERRERO    Indications    PAF (paroxysmal atrial fibrillation) (H) [I48.0]  Mechanical AV Replacement 1992 -- on Warfarin  [Z95.2]             Comments:  3/20/24-goal 2.8-3.5             Anticoagulation Care Providers        Provider Role Specialty Phone number    Obdulio Ingram MD Referring Internal Medicine [phone]    Herve Morgan MD Referring Internal Medicine - Pediatrics [phone]

## 2024-11-13 RX ORDER — BISACODYL 5 MG/1
TABLET, DELAYED RELEASE ORAL
Qty: 4 TABLET | Refills: 0 | Status: SHIPPED | OUTPATIENT
Start: 2024-11-13

## 2024-11-13 NOTE — TELEPHONE ENCOUNTER
Extended Golytely Bowel Prep  recommended due to BMI > 40.  Instructions were sent via [employer]. Bowel prep was sent 11/13/2024 to [id number] - [place], [place] - [address] [place] [address].       Soco Alejandra LPN  Endoscopy Procedure Pre Assessment

## 2024-11-19 ENCOUNTER — TELEPHONE (OUTPATIENT)
Dept: GASTROENTEROLOGY | Facility: CLINIC | Age: 69
End: 2024-11-19
Payer: MEDICARE

## 2024-11-19 NOTE — TELEPHONE ENCOUNTER
Attempted to contact patient in order to complete pre assessment questions.     No answer. Left message to return call to [phone] option 2.    Callback required communication sent via [employer].      Vanessa Loya RN  Endoscopy Procedure Pre Assessment

## 2024-11-19 NOTE — TELEPHONE ENCOUNTER
Rescheduled Procedure  Due to:  Not a candidate for CS at  ([facility]) due to their exclusion criteria (recent stroke).     Pre visit planning completed.      Procedure details:    Patient scheduled for Colonoscopy on 12/3/24.     Arrival time: 1515. Procedure time 1600    Facility location: [facility]; [facility] [place] [address]. Check in location: [facility].     Sedation type: Conscious sedation -Documented history of alcohol dependence (in remission), chronic pain syndrome and dementia. Tolerated colonoscopy in 2015 under conscious sedation however most recent colonoscopy 1/31/2020 was performed under [facility] ([facility]). Patient has already been approved to proceed with CS at  by Annette Cobos  endo nurse manager (per the nursing note in scheduling TE dated 10/31/24).    Pre op exam needed? No.    Indication for procedure: Screening per order, Personal hx colon polyps/surveillance per chart review      Chart review:     Electronic implanted devices? No    Recent diagnosis of diverticulitis within the last 6 weeks? No      Medication review:    Diabetic? No    Anticoagulants? Yes Coumadin (Warfarin): Recommended HOLD 5 days before procedure.  Consult with your managing provider.  Enoxaparin (Lovenox): Recommended HOLD 24 hours before procedure.  Consult with your managing provider.    Weight loss medication/injectable? No GLP-1 medication per patient's medication list. Nursing to verify with pre-assessment call.    Other medication HOLDING recommendations:  N/A      Prep for procedure:     Bowel prep recommendation: Extended Golytely. Bowel prep prescription previously sent on 11/13/24 to [id number] - [place], [place] - [address] [facility]   Due to: BMI > 40.     Prep instructions resent via krzysztof Loya RN  Endoscopy Procedure Pre Assessment   [phone] option 2

## 2024-11-21 NOTE — TELEPHONE ENCOUNTER
Pre assessment completed for upcoming procedure.   (Please see previous telephone encounter notes for complete details)    Patient  returned call.       Procedure details:    Arrival time and facility location reviewed.    Pre op exam needed? No.    Designated  policy reviewed. Instructed to have someone stay 6  hours post procedure.       Medication review:    Medications reviewed. Please see supporting documentation below. Holding recommendations discussed (if applicable).       Prep for procedure:     Procedure prep instructions reviewed.        Any additional information needed:  N/A      Patient  verbalized understanding and had no questions or concerns at this time.      Corinne Kliber, RN  Endoscopy Procedure Pre Assessment   [phone] option 2

## 2024-11-26 ENCOUNTER — LAB (OUTPATIENT)
Dept: LAB | Facility: CLINIC | Age: 69
End: 2024-11-26
Payer: MEDICARE

## 2024-11-26 ENCOUNTER — ANTICOAGULATION THERAPY VISIT (OUTPATIENT)
Dept: ANTICOAGULATION | Facility: CLINIC | Age: 69
End: 2024-11-26

## 2024-11-26 DIAGNOSIS — I48.0 PAF (PAROXYSMAL ATRIAL FIBRILLATION) (H): ICD-10-CM

## 2024-11-26 DIAGNOSIS — Z95.2 AORTIC VALVE PROSTHESIS PRESENT: ICD-10-CM

## 2024-11-26 DIAGNOSIS — I48.0 PAF (PAROXYSMAL ATRIAL FIBRILLATION) (H): Primary | ICD-10-CM

## 2024-11-26 DIAGNOSIS — M62.830 BACK MUSCLE SPASM: ICD-10-CM

## 2024-11-26 LAB — INR BLD: 2.7 (ref 0.9–1.1)

## 2024-11-26 PROCEDURE — 85610 PROTHROMBIN TIME: CPT

## 2024-11-26 PROCEDURE — 36416 COLLJ CAPILLARY BLOOD SPEC: CPT

## 2024-11-26 RX ORDER — DIAZEPAM 5 MG/1
5 TABLET ORAL
Qty: 30 TABLET | Refills: 0 | Status: SHIPPED | OUTPATIENT
Start: 2024-11-26

## 2024-11-26 NOTE — TELEPHONE ENCOUNTER
Reviewed warfarin hold plan with anticoagulation clinic encounter today.  See anticoagulation clinic encounter for current dosing instructions.

## 2024-11-26 NOTE — PROGRESS NOTES
Chart reviewed with [facility] RN at time of encounter.    Advise 3% maintenance dose increase due to being slightly subtherapeutic today and at bottom of range 2 weeks prior to today.    Also advise start Lovenox 11/29/2024 AM, since expect INR to drift towards 2.5 or slightly below by that time, and history of CVA 03/2024 at INR 2.2    Nolvia Andrew, PharmD BCACP  Anticoagulation Clinical Pharmacist

## 2024-11-26 NOTE — PROGRESS NOTES
ANTICOAGULATION MANAGEMENT     Todd S Aschoff 68 year old male is on warfarin with subtherapeutic INR result. (Goal INR 2.8-3.5)    Recent labs: (last 7 days)     11/26/24  1135   INR 2.7*       ASSESSMENT     Source(s): Chart Review and Patient/Caregiver Call     Warfarin doses taken: Less warfarin taken than planned which may be affecting INR  Diet: No new diet changes identified  Medication/supplement changes: None noted  New illness, injury, or hospitalization: No  Signs or symptoms of bleeding or clotting: No  Previous result: Therapeutic last visit; previously outside of goal range  Additional findings: Upcoming surgery/procedure colonoscopy 12/3       PLAN     Recommended plan for no diet, medication or health factor changes affecting INR     Dosing Instructions: Continue your current warfarin dose then,    Pre-Procedure:  Hold warfarin for 5 days, until after procedure starting: Thursday, November 28   Enoxaparin (Lovenox) 105 mg subq Q 12 hrs (0.75 mg/kg Q 12 hrs for BMI >= 40 kg/m2 per [facility] P&T approved dose adjustment protocol)   Start enoxaparin:11/29/24  Last dose of enoxaparin prior to procedure: Monday, December 2 in AM  (~24 hours prior to procedure)  Check LMWH Anti-Xa on 12/2: 4 to 6 hours after last injection.  (Assisted patient to schedule)     Post-Procedure:  Resume warfarin dose if okay with provider doing procedure on night of procedure, Tuesday, December 3 PM: take 10 mg x 2, then increase warfarin maintenance dose 3%.  Resume enoxaparin (Lovenox) ~ 24 hrs post procedure if no polyps removed or 48 hours if polypectomy performed when okay with provider doing procedure. Continue until INR >= 2.5  Recheck INR 5-7 days after resuming warfarin        Summary  As of 11/26/2024      Full warfarin instructions:  11/26: 5 mg; 11/28: Hold; 11/29: Hold; 11/30: Hold; 12/1: Hold; 12/2: Hold; 12/3: 10 mg; 12/4: 10 mg; Otherwise 6.25 mg every Sun, Tue, Thu; 5 mg all other days   Next INR  check:  12/10/2024               Telephone call with Nicko who verbalizes understanding and agrees to plan and [employer] message with detailed instructions also sent.    Lab visit scheduled    Education provided: Please call back if any changes to your diet, medications or how you've been taking warfarin    Plan made with [facility] Pharmacist Nolvia Wheeler, RN  11/26/2024  Anticoagulation Clinic  [employer] for routing messages: tamanna GUERRERO  [facility] patient phone line: [phone]        _______________________________________________________________________     Anticoagulation Episode Summary       Current INR goal:  Other - see comment   TTR:  41.8% (12 mo)   Target end date:  Indefinite   Send INR reminders to:  AIDA GUERRERO    Indications    PAF (paroxysmal atrial fibrillation) (H) [I48.0]  Mechanical AV Replacement 1992 -- on Warfarin  [Z95.2]             Comments:  3/20/24-goal 2.8-3.5             Anticoagulation Care Providers       Provider Role Specialty Phone number    Obdulio Ingram MD Referring Internal Medicine [phone]    Herve Morgan MD Referring Internal Medicine - Pediatrics [phone]

## 2024-12-02 ENCOUNTER — LAB (OUTPATIENT)
Dept: LAB | Facility: CLINIC | Age: 69
End: 2024-12-02
Payer: MEDICARE

## 2024-12-02 ENCOUNTER — ANTICOAGULATION THERAPY VISIT (OUTPATIENT)
Dept: ANTICOAGULATION | Facility: CLINIC | Age: 69
End: 2024-12-02

## 2024-12-02 DIAGNOSIS — I48.0 PAF (PAROXYSMAL ATRIAL FIBRILLATION) (H): ICD-10-CM

## 2024-12-02 DIAGNOSIS — Z95.2 AORTIC VALVE PROSTHESIS PRESENT: ICD-10-CM

## 2024-12-02 DIAGNOSIS — I48.0 PAF (PAROXYSMAL ATRIAL FIBRILLATION) (H): Primary | ICD-10-CM

## 2024-12-02 LAB — INR BLD: 2 (ref 0.9–1.1)

## 2024-12-02 PROCEDURE — 36416 COLLJ CAPILLARY BLOOD SPEC: CPT

## 2024-12-02 PROCEDURE — 85610 PROTHROMBIN TIME: CPT

## 2024-12-02 NOTE — PROGRESS NOTES
ANTICOAGULATION MANAGEMENT     Todd S Aschoff 68 year old male is on warfarin with subtherapeutic INR result. (Goal INR  2.8-3.5 )    Recent labs: (last 7 days)     12/02/24  1203   INR 2.0*       ASSESSMENT     Source(s): Chart Review and Patient/Caregiver Call     Warfarin doses taken: Held for 4 days  recently which may be affecting INR, will continue intentional hold today  Diet: No new diet changes identified  Medication/supplement changes:  patient is   New illness, injury, or hospitalization: No  Signs or symptoms of bleeding or clotting: No  Previous result: Therapeutic last 2(+) visits  Additional findings: Bridging with Enoxaparin until INR >= 2.5       PLAN     Recommended plan for temporary change(s) affecting INR     Dosing Instructions:  Resume warfarin evening of 12/3/24 IF ok with GI doctor, take 10 mg x 2 days then resume listed maintenance dose (reviewed with patient)  with next INR in 8 days. Reminded patient to review My Chart message outlining hold/bridge instructions, patient verbalized understanding.       Summary  As of 12/2/2024      Full warfarin instructions:  12/2: Hold; 12/3: 10 mg; 12/4: 10 mg; Otherwise 6.25 mg every Sun, Tue, Thu; 5 mg all other days   Next INR check:  12/10/2024               Telephone call with Nicko who verbalizes understanding and agrees to plan    Lab visit scheduled    Education provided: Contact [phone] with any changes, questions or concerns.     Plan made per [facility] anticoagulation protocol    Angely Smalls RN  12/2/2024  Anticoagulation Clinic  [facility] for routing messages: tamanna GUERRERO  [facility] patient phone line: [phone]        _______________________________________________________________________     Anticoagulation Episode Summary       Current INR goal:  Other - see comment   TTR:  43.5% (12 mo)   Target end date:  Indefinite   Send INR reminders to:  AIDA GUERRERO    Indications    PAF (paroxysmal atrial fibrillation) (H) [I48.0]  Mechanical  AV Replacement 1992 -- on Warfarin  [Z95.2]             Comments:  3/20/24-goal 2.8-3.5             Anticoagulation Care Providers       Provider Role Specialty Phone number    Obdulio Ingram MD Referring Internal Medicine [phone]    Herve Morgan MD Referring Internal Medicine - Pediatrics [phone]

## 2024-12-03 ENCOUNTER — TELEPHONE (OUTPATIENT)
Dept: GASTROENTEROLOGY | Facility: CLINIC | Age: 69
End: 2024-12-03
Payer: MEDICARE

## 2024-12-03 NOTE — TELEPHONE ENCOUNTER
Caller: Todd S Aschoff   Reason for Reschedule/Cancellation (please be detailed, any staff messages or encounters to note?):     Per pt/RN -- poor prep      Prior to reschedule please review:  Ordering Provider:    Herve Morgan MD      Sedation Determined: mod   Does patient have any ASC Exclusions, please identify?: n       Notes on Cancelled Procedure:  Procedure:Lower Endoscopy [Colonoscopy]   Date: 12/03/2024   Location:[facility]; [address] [place] [address]  Surgeon: Fady         Rescheduled: Yes   Procedure: Lower Endoscopy [Colonoscopy]  Date: 12/10/2024   Location: [facility]; [address] [place] [address]   Surgeon: Walt  Sedation Level Scheduled  mod          Reason for Sedation Level per order   Prep/Instructions updated and sent: mychart      Does patient need PAC or Pre -Op Rescheduled? : n       Send In - basket message to Panc - Jeff Pool if EUS procedure is canceled or rescheduled: [ N/A, YES or NO] n

## 2024-12-05 ENCOUNTER — TELEPHONE (OUTPATIENT)
Dept: NEUROLOGY | Facility: CLINIC | Age: 69
End: 2024-12-05
Payer: MEDICARE

## 2024-12-05 NOTE — TELEPHONE ENCOUNTER
Attempted to reach patient to remind them about appointment scheduled with Nancy Flanagan CNP on 12/6/24 in our [place] location.    A voicemail was left with a call back number if the patient has questions or would like to reschedule.

## 2024-12-10 ENCOUNTER — DOCUMENTATION ONLY (OUTPATIENT)
Dept: ANTICOAGULATION | Facility: CLINIC | Age: 69
End: 2024-12-10

## 2024-12-10 ENCOUNTER — HOSPITAL ENCOUNTER (OUTPATIENT)
Facility: CLINIC | Age: 69
Discharge: HOME | End: 2024-12-10
Attending: COLON & RECTAL SURGERY | Admitting: COLON & RECTAL SURGERY
Payer: MEDICARE

## 2024-12-10 VITALS
HEART RATE: 74 BPM | OXYGEN SATURATION: 95 % | RESPIRATION RATE: 21 BRPM | DIASTOLIC BLOOD PRESSURE: 89 MMHG | SYSTOLIC BLOOD PRESSURE: 129 MMHG

## 2024-12-10 DIAGNOSIS — I48.0 PAF (PAROXYSMAL ATRIAL FIBRILLATION) (H): Primary | ICD-10-CM

## 2024-12-10 DIAGNOSIS — Z95.2 AORTIC VALVE PROSTHESIS PRESENT: ICD-10-CM

## 2024-12-10 PROBLEM — D12.6 ADENOMATOUS POLYP OF COLON: Status: ACTIVE | Noted: 2024-12-10

## 2024-12-10 LAB — COLONOSCOPY: NORMAL

## 2024-12-10 PROCEDURE — 250N000011 HC RX IP 250 OP 636: Performed by: COLON & RECTAL SURGERY

## 2024-12-10 PROCEDURE — 45378 DIAGNOSTIC COLONOSCOPY: CPT | Performed by: COLON & RECTAL SURGERY

## 2024-12-10 PROCEDURE — 99153 MOD SED SAME PHYS/QHP EA: CPT | Performed by: COLON & RECTAL SURGERY

## 2024-12-10 PROCEDURE — G0105 COLORECTAL SCRN; HI RISK IND: HCPCS | Performed by: COLON & RECTAL SURGERY

## 2024-12-10 PROCEDURE — G0500 MOD SEDAT ENDO SERVICE >5YRS: HCPCS | Performed by: COLON & RECTAL SURGERY

## 2024-12-10 RX ORDER — ONDANSETRON 2 MG/ML
4 INJECTION INTRAMUSCULAR; INTRAVENOUS
Status: CANCELLED | OUTPATIENT
Start: 2024-12-10

## 2024-12-10 RX ORDER — NALOXONE HYDROCHLORIDE 0.4 MG/ML
0.2 INJECTION, SOLUTION INTRAMUSCULAR; INTRAVENOUS; SUBCUTANEOUS
Status: CANCELLED | OUTPATIENT
Start: 2024-12-10

## 2024-12-10 RX ORDER — NALOXONE HYDROCHLORIDE 0.4 MG/ML
0.4 INJECTION, SOLUTION INTRAMUSCULAR; INTRAVENOUS; SUBCUTANEOUS
Status: CANCELLED | OUTPATIENT
Start: 2024-12-10

## 2024-12-10 RX ORDER — LIDOCAINE 40 MG/G
CREAM TOPICAL
Status: CANCELLED | OUTPATIENT
Start: 2024-12-10

## 2024-12-10 RX ORDER — SODIUM CHLORIDE, SODIUM LACTATE, POTASSIUM CHLORIDE, CALCIUM CHLORIDE 600; 310; 30; 20 MG/100ML; MG/100ML; MG/100ML; MG/100ML
INJECTION, SOLUTION INTRAVENOUS CONTINUOUS
Status: CANCELLED | OUTPATIENT
Start: 2024-12-10

## 2024-12-10 RX ORDER — ONDANSETRON 4 MG/1
4 TABLET, ORALLY DISINTEGRATING ORAL EVERY 6 HOURS PRN
Status: CANCELLED | OUTPATIENT
Start: 2024-12-10

## 2024-12-10 RX ORDER — FENTANYL CITRATE 50 UG/ML
INJECTION, SOLUTION INTRAMUSCULAR; INTRAVENOUS PRN
Status: DISCONTINUED | OUTPATIENT
Start: 2024-12-10 | End: 2024-12-10 | Stop reason: HOSPADM

## 2024-12-10 RX ORDER — FLUMAZENIL 0.1 MG/ML
0.2 INJECTION, SOLUTION INTRAVENOUS
Status: CANCELLED | OUTPATIENT
Start: 2024-12-10 | End: 2024-12-10

## 2024-12-10 RX ORDER — ONDANSETRON 2 MG/ML
4 INJECTION INTRAMUSCULAR; INTRAVENOUS EVERY 6 HOURS PRN
Status: CANCELLED | OUTPATIENT
Start: 2024-12-10

## 2024-12-10 RX ORDER — PROCHLORPERAZINE MALEATE 5 MG/1
5 TABLET ORAL EVERY 6 HOURS PRN
Status: CANCELLED | OUTPATIENT
Start: 2024-12-10

## 2024-12-10 ASSESSMENT — ACTIVITIES OF DAILY LIVING (ADL)
ADLS_ACUITY_SCORE: 59
ADLS_ACUITY_SCORE: 59

## 2024-12-10 NOTE — PROGRESS NOTES
"ANTICOAGULATION  MANAGEMENT: Discharge Review    Todd S Aschoff chart reviewed for anticoagulation continuity of care    Outpatient surgery/procedure on 12/10/24 for colonoscopy.    Discharge disposition: Home    Results:    No results for input(s): \"INR\", \"[id number]\", \"F2\", \"ALMWH\", \"AAUFH\" in the last 168 hours.  Anticoagulation inpatient management:     not applicable     Anticoagulation discharge instructions:     Warfarin dosing: home regimen continued   Bridging: bridging with enoxaparin (Lovenox)   INR goal change: No      Medication changes affecting anticoagulation: No    Additional factors affecting anticoagulation: No specimens collected.     PLAN     No adjustment to anticoagulation plan needed    Left a detailed message for Nicko, advising a follow up INR in 1 week.  Also advised him that if he has any questions about his warfarin or Lovenox instructions to call anticoagulation clinic.  Patient has uses warfarin bridging in the past when off warfarin so is familiar with the process.    Anticoagulation Calendar updated    Amanda Wheeler RN  12/10/2024  Anticoagulation Clinic  [facility] for routing messages: tamanna GUERRERO  [facility] patient phone line: [phone]  "

## 2024-12-11 ENCOUNTER — PATIENT OUTREACH (OUTPATIENT)
Dept: GASTROENTEROLOGY | Facility: CLINIC | Age: 69
End: 2024-12-11
Payer: MEDICARE

## 2024-12-18 ENCOUNTER — TELEPHONE (OUTPATIENT)
Dept: ANTICOAGULATION | Facility: CLINIC | Age: 69
End: 2024-12-18
Payer: MEDICARE

## 2024-12-18 NOTE — TELEPHONE ENCOUNTER
ANTICOAGULATION     Nicko S Huiru is overdue for an INR check.     Spoke with Nicko and scheduled lab appointment on 12/19/24    Amanda Wheeler, RN  12/18/2024  Anticoagulation Clinic  [facility] for routing messages: tamanna GUERRERO  [facility] patient phone line: [phone]

## 2024-12-24 ENCOUNTER — LAB (OUTPATIENT)
Dept: LAB | Facility: CLINIC | Age: 69
End: 2024-12-24
Payer: MEDICARE

## 2024-12-24 ENCOUNTER — ANTICOAGULATION THERAPY VISIT (OUTPATIENT)
Dept: ANTICOAGULATION | Facility: CLINIC | Age: 69
End: 2024-12-24

## 2024-12-24 DIAGNOSIS — I48.0 PAF (PAROXYSMAL ATRIAL FIBRILLATION) (H): Primary | ICD-10-CM

## 2024-12-24 DIAGNOSIS — Z95.2 AORTIC VALVE PROSTHESIS PRESENT: ICD-10-CM

## 2024-12-24 DIAGNOSIS — I48.0 PAF (PAROXYSMAL ATRIAL FIBRILLATION) (H): ICD-10-CM

## 2024-12-24 LAB — INR BLD: 2 (ref 0.9–1.1)

## 2024-12-24 PROCEDURE — 85610 PROTHROMBIN TIME: CPT

## 2024-12-24 PROCEDURE — 36416 COLLJ CAPILLARY BLOOD SPEC: CPT

## 2024-12-24 NOTE — PROGRESS NOTES
ANTICOAGULATION MANAGEMENT     Todd S Aschoff 69 year old male is on warfarin with subtherapeutic INR result. (Goal INR Other - see comment)    Recent labs: (last 7 days)     12/24/24  0957   INR 2.0*       ASSESSMENT     Source(s): Chart Review and Patient/Caregiver Call     Warfarin doses taken: Booster dose(s) recently taken which may be affecting INR  Diet: No new diet changes identified, patient denies resuming V8 juice or increasing green veggies  Medication/supplement changes: None noted, patient denies adding or changing any supplements  New illness, injury, or hospitalization: No  Signs or symptoms of bleeding or clotting: No  Previous result: Subtherapeutic  Additional findings: None and Bridging with Enoxaparin until INR >= 2.5       PLAN     Recommended plan for no diet, medication or health factor changes affecting INR     Dosing Instructions: booster dose then Increase your warfarin dose (6.2% change) with next INR in 3 days       Summary  As of 12/24/2024      Full warfarin instructions:  12/24: 10 mg; Otherwise 5 mg every Mon; 6.25 mg all other days   Next INR check:  12/27/2024               Telephone call with Nicko who verbalizes understanding and agrees to plan    Lab visit scheduled    Education provided: Please call back if any changes to your diet, medications or how you've been taking warfarin  Contact [phone] with any changes, questions or concerns.     Plan made with [facility] Pharmacist Jazmine Andrew RN  12/24/2024  Anticoagulation Clinic  [employer] for routing messages: tamanna GUERRERO  [facility] patient phone line: [phone]        _______________________________________________________________________     Anticoagulation Episode Summary       Current INR goal:  Other - see comment   TTR:  40.9% (12 mo)   Target end date:  Indefinite   Send INR reminders to:  AIDA GUERRERO    Indications    PAF (paroxysmal atrial fibrillation) (H) [I48.0]  Mechanical AV Replacement 1992  -- on Warfarin  [Z95.2]             Comments:  3/20/24-goal 2.8-3.5             Anticoagulation Care Providers       Provider Role Specialty Phone number    Obdulio Ingram MD Referring Internal Medicine [phone]    Herve Morgan MD Referring Internal Medicine - Pediatrics [phone]

## 2024-12-27 ENCOUNTER — ANTICOAGULATION THERAPY VISIT (OUTPATIENT)
Dept: ANTICOAGULATION | Facility: CLINIC | Age: 69
End: 2024-12-27

## 2024-12-27 DIAGNOSIS — I48.0 PAF (PAROXYSMAL ATRIAL FIBRILLATION) (H): Primary | ICD-10-CM

## 2024-12-27 DIAGNOSIS — Z95.2 AORTIC VALVE PROSTHESIS PRESENT: ICD-10-CM

## 2024-12-27 NOTE — PROGRESS NOTES
ANTICOAGULATION MANAGEMENT     Todd S Aschoff 69 year old male is on warfarin with therapeutic INR result. (Goal INR Other - see comment) 2.8-3.5    Recent labs: (last 7 days)     12/27/24  1340   INR 3.2*       ASSESSMENT     Source(s): Chart Review  Previous INR was Subtherapeutic  Medication, diet, health changes since last INR chart reviewed; none identified         PLAN     Unable to reach Nicko today.    Left message to 6.25 mg daily  this weekend. Request call back for assessment.    Follow up required to confirm warfarin dose taken and assess for changes, assess for changes , and discuss out of range result     Arabella Guerrero, RN  12/27/2024  Anticoagulation Clinic  [facility] for routing messages: tamanna GUERRERO  [facility] patient phone line: [phone]

## 2024-12-30 NOTE — PROGRESS NOTES
ANTICOAGULATION MANAGEMENT     Todd S Aschoff 69 year old male is on warfarin with therapeutic INR result. (Goal INR Other - see comment)    Recent labs: (last 7 days)     12/27/24  1340   INR 3.2*       ASSESSMENT     Source(s): Chart Review and Patient/Caregiver Call     Warfarin doses taken: Warfarin taken as instructed - confirmed booster 12/24. Stopped bridging on 12/27 as he ran out of lovenox (would have been advised anyway with his return to therapeutic range)  Diet: No new diet changes identified  Medication/supplement changes: None noted  New illness, injury, or hospitalization: No  Signs or symptoms of bleeding or clotting: No  Previous result: Subtherapeutic  Additional findings: Nicko's wife has been in the hospital, hence the difficulty reaching him.      PLAN     Recommended plan for temporary change(s) affecting INR     Dosing Instructions: Continue your current warfarin dose (lovenox bridge already stopped) with next INR in 10 days       Summary  As of 12/27/2024      Full warfarin instructions:  5 mg every Mon; 6.25 mg all other days   Next INR check:  1/6/2025               Telephone call with Nicko who verbalizes understanding and agrees to plan    Lab visit scheduled    Education provided: Please call back if any changes to your diet, medications or how you've been taking warfarin    Plan made per [facility] anticoagulation protocol    Marianne Andrew RN  12/30/2024  Anticoagulation Clinic  [facility] for routing messages: tamanna GUERRERO  [facility] patient phone line: [phone]        _______________________________________________________________________     Anticoagulation Episode Summary       Current INR goal:  Other - see comment   TTR:  40.3% (11.9 mo)   Target end date:  Indefinite   Send INR reminders to:  AIDA GUERRERO    Indications    PAF (paroxysmal atrial fibrillation) (H) [I48.0]  Mechanical AV Replacement 1992 -- on Warfarin  [Z95.2]             Comments:  3/20/24-goal 2.8-3.5              Anticoagulation Care Providers       Provider Role Specialty Phone number    Obdulio Ingram MD Referring Internal Medicine [phone]    Herve oMrgan MD Referring Internal Medicine - Pediatrics [phone]

## 2025-01-09 ENCOUNTER — ANTICOAGULATION THERAPY VISIT (OUTPATIENT)
Dept: ANTICOAGULATION | Facility: CLINIC | Age: 70
End: 2025-01-09

## 2025-01-09 ENCOUNTER — LAB (OUTPATIENT)
Dept: LAB | Facility: CLINIC | Age: 70
End: 2025-01-09
Payer: MEDICARE

## 2025-01-09 DIAGNOSIS — Z95.2 AORTIC VALVE PROSTHESIS PRESENT: ICD-10-CM

## 2025-01-09 DIAGNOSIS — I48.0 PAF (PAROXYSMAL ATRIAL FIBRILLATION) (H): ICD-10-CM

## 2025-01-09 DIAGNOSIS — I48.0 PAF (PAROXYSMAL ATRIAL FIBRILLATION) (H): Primary | ICD-10-CM

## 2025-01-09 LAB — INR BLD: 3.4 (ref 0.9–1.1)

## 2025-01-09 NOTE — PROGRESS NOTES
ANTICOAGULATION MANAGEMENT     Todd S Aschoff 69 year old male is on warfarin with therapeutic INR result. (Goal INR Other - 2.8-3.5)    Recent labs: (last 7 days)     01/09/25  1402   INR 3.4*       ASSESSMENT     Source(s): Chart Review  Previous INR was Therapeutic last visit; previously outside of goal range  Medication, diet, health changes since last INR chart reviewed; none identified         PLAN     Recommended plan for no diet, medication or health factor changes affecting INR     Dosing Instructions: Continue your current warfarin dose with next INR in 3 weeks       Summary  As of 1/9/2025      Full warfarin instructions:  5 mg every Mon; 6.25 mg all other days   Next INR check:  1/30/2025               Detailed voice message left for Nicko with dosing instructions and follow up date.     Contact [phone] to schedule and with any changes, questions or concerns.     Education provided: Please call back if any changes to your diet, medications or how you've been taking warfarin    Plan made per [facility] anticoagulation protocol    Amanda Wheeler RN  1/9/2025  Anticoagulation Clinic  [facility] for routing messages: tamanna GUERRERO  [facility] patient phone line: [phone]        _______________________________________________________________________     Anticoagulation Episode Summary       Current INR goal:  Other - see comment   TTR:  37.7% (12 mo)   Target end date:  Indefinite   Send INR reminders to:  AIDA GUERRERO    Indications    PAF (paroxysmal atrial fibrillation) (H) [I48.0]  Mechanical AV Replacement 1992 -- on Warfarin  [Z95.2]             Comments:  3/20/24-goal 2.8-3.5             Anticoagulation Care Providers       Provider Role Specialty Phone number    Obdulio Ingram MD Referring Internal Medicine [phone]    Herve Morgan MD Referring Internal Medicine - Pediatrics [phone]

## 2025-01-30 ENCOUNTER — LAB (OUTPATIENT)
Dept: LAB | Facility: CLINIC | Age: 70
End: 2025-01-30
Payer: COMMERCIAL

## 2025-01-30 ENCOUNTER — ANTICOAGULATION THERAPY VISIT (OUTPATIENT)
Dept: ANTICOAGULATION | Facility: CLINIC | Age: 70
End: 2025-01-30

## 2025-01-30 DIAGNOSIS — I48.0 PAF (PAROXYSMAL ATRIAL FIBRILLATION) (H): ICD-10-CM

## 2025-01-30 DIAGNOSIS — I48.0 PAF (PAROXYSMAL ATRIAL FIBRILLATION) (H): Primary | ICD-10-CM

## 2025-01-30 DIAGNOSIS — Z95.2 AORTIC VALVE PROSTHESIS PRESENT: ICD-10-CM

## 2025-01-30 LAB — INR BLD: 4 (ref 0.9–1.1)

## 2025-01-30 NOTE — PROGRESS NOTES
ANTICOAGULATION MANAGEMENT     Todd S Aschoff 69 year old male is on warfarin with supratherapeutic INR result. (Goal INR Other - 2.8-3.5)    Recent labs: (last 7 days)     01/30/25  1139   INR 4.0*       ASSESSMENT     Source(s): Chart Review and Patient/Caregiver Call     Warfarin doses taken: Warfarin taken as instructed  Diet: No new diet changes identified  Medication/supplement changes: None noted  New illness, injury, or hospitalization: No  Signs or symptoms of bleeding or clotting: No  Previous result: Therapeutic last 2(+) visits  Additional findings: None       PLAN     Recommended plan for no diet, medication or health factor changes affecting INR     Dosing Instructions: decrease your warfarin dose (3% change) with next INR in 10 days       Summary  As of 1/30/2025      Full warfarin instructions:  5 mg every Mon, Thu; 6.25 mg all other days   Next INR check:  2/27/2025               Telephone call with Nicko who agrees to plan and repeated back plan correctly    Lab visit scheduled    Education provided: Please call back if any changes to your diet, medications or how you've been taking warfarin    Plan made with [facility] Pharmacist Nolvia Wheeler RN  1/30/2025  Anticoagulation Clinic  [employer] for routing messages: tamanna GUERRERO  [facility] patient phone line: [phone]        _______________________________________________________________________     Anticoagulation Episode Summary       Current INR goal:  Other - see comment   TTR:  37.6% (12 mo)   Target end date:  Indefinite   Send INR reminders to:  AIDA GUERRERO    Indications    PAF (paroxysmal atrial fibrillation) (H) [I48.0]  Mechanical AV Replacement 1992 -- on Warfarin  [Z95.2]             Comments:  3/20/24-goal 2.8-3.5             Anticoagulation Care Providers       Provider Role Specialty Phone number    Obdulio Ingram MD Referring Internal Medicine [phone]    Herve Morgan MD Referring Internal Medicine -  Pediatrics [phone]

## 2025-02-01 DIAGNOSIS — M62.830 BACK MUSCLE SPASM: ICD-10-CM

## 2025-02-02 RX ORDER — DIAZEPAM 5 MG/1
5 TABLET ORAL
Qty: 30 TABLET | Refills: 0 | Status: SHIPPED | OUTPATIENT
Start: 2025-02-02

## 2025-02-24 ENCOUNTER — ANTICOAGULATION THERAPY VISIT (OUTPATIENT)
Dept: ANTICOAGULATION | Facility: CLINIC | Age: 70
End: 2025-02-24

## 2025-02-24 ENCOUNTER — LAB (OUTPATIENT)
Dept: LAB | Facility: CLINIC | Age: 70
End: 2025-02-24
Payer: COMMERCIAL

## 2025-02-24 DIAGNOSIS — Z95.2 AORTIC VALVE PROSTHESIS PRESENT: ICD-10-CM

## 2025-02-24 DIAGNOSIS — I48.0 PAF (PAROXYSMAL ATRIAL FIBRILLATION) (H): ICD-10-CM

## 2025-02-24 DIAGNOSIS — I48.0 PAF (PAROXYSMAL ATRIAL FIBRILLATION) (H): Primary | ICD-10-CM

## 2025-02-24 LAB — INR BLD: 2.3 (ref 0.9–1.1)

## 2025-02-24 PROCEDURE — 36416 COLLJ CAPILLARY BLOOD SPEC: CPT

## 2025-02-24 PROCEDURE — 85610 PROTHROMBIN TIME: CPT

## 2025-02-24 NOTE — PROGRESS NOTES
ANTICOAGULATION MANAGEMENT     Todd S Aschoff 69 year old male is on warfarin with subtherapeutic INR result. (Goal INR Other - see comment)    Recent labs: (last 7 days)     02/24/25  1130   INR 2.3*       ASSESSMENT     Source(s): Chart Review  Previous INR was Supratherapeutic  Medication, diet, health changes since last INR chart reviewed; none identified  6.5% dosing increase pended per [facility] instruction with advised recheck in 2 weeks.         PLAN     Unable to reach Nicko x2 today. Also sent [employer] message.    Left message to continue current dose of warfarin 6.25 mg tonight. Request call back for assessment.    Follow up required to confirm warfarin dose taken and assess for changes and discuss out of range result     Marianne Andrew RN  2/24/2025  Anticoagulation Clinic  [facility] for routing messages: tamanna GUERRERO  [facility] patient phone line: [phone]

## 2025-02-25 NOTE — PROGRESS NOTES
ANTICOAGULATION MANAGEMENT     Todd S Aschoff 69 year old male is on warfarin with subtherapeutic INR result. (Goal INR Other - 2.8-3.5)    Recent labs: (last 7 days)     02/24/25  1130   INR 2.3*       ASSESSMENT     Source(s): Chart Review and Patient/Caregiver Call     Warfarin doses taken: Warfarin taken as instructed  Diet: No new diet changes identified  Medication/supplement changes: None noted  New illness, injury, or hospitalization: No  Signs or symptoms of bleeding or clotting: No  Previous result: Supratherapeutic.  Warfarin maintenance dose was decreased 6% at last INR check.  Additional findings: None       PLAN     Recommended plan for no diet, medication or health factor changes affecting INR     Dosing Instructions: Increase your warfarin dose (6.5% change) with next INR in 2 weeks       Summary  As of 2/24/2025      Full warfarin instructions:  5 mg every Sun, Thu; 6.25 mg all other days   Next INR check:  3/10/2025               Telephone call with Nicko who agrees to plan and repeated back plan correctly    Lab visit scheduled    Education provided: Please call back if any changes to your diet, medications or how you've been taking warfarin    Plan made with [facility] Pharmacist Jazmine Wheeler, RN  2/25/2025  Anticoagulation Clinic  [employer] for routing messages: tamanna GUERRERO  [facility] patient phone line: [phone]        _______________________________________________________________________     Anticoagulation Episode Summary       Current INR goal:  Other - see comment   TTR:  31.7% (11.9 mo)   Target end date:  Indefinite   Send INR reminders to:  AIDA GUERRERO    Indications    PAF (paroxysmal atrial fibrillation) (H) [I48.0]  Mechanical AV Replacement 1992 -- on Warfarin  [Z95.2]             Comments:  3/20/24-goal 2.8-3.5 (pt had a stroke with INR of 2.5)             Anticoagulation Care Providers       Provider Role Specialty Phone number    Obdulio Ingram MD Referring  Internal Medicine [phone]    Herve Morgan MD Referring Internal Medicine - Pediatrics [phone]

## 2025-03-08 DIAGNOSIS — M62.830 BACK MUSCLE SPASM: ICD-10-CM

## 2025-03-09 RX ORDER — DIAZEPAM 5 MG/1
5 TABLET ORAL
Qty: 30 TABLET | Refills: 0 | Status: SHIPPED | OUTPATIENT
Start: 2025-03-09

## 2025-03-10 ENCOUNTER — ANTICOAGULATION THERAPY VISIT (OUTPATIENT)
Dept: ANTICOAGULATION | Facility: CLINIC | Age: 70
End: 2025-03-10

## 2025-03-10 ENCOUNTER — LAB (OUTPATIENT)
Dept: LAB | Facility: CLINIC | Age: 70
End: 2025-03-10
Payer: COMMERCIAL

## 2025-03-10 DIAGNOSIS — Z95.2 AORTIC VALVE PROSTHESIS PRESENT: ICD-10-CM

## 2025-03-10 DIAGNOSIS — I48.0 PAF (PAROXYSMAL ATRIAL FIBRILLATION) (H): Primary | ICD-10-CM

## 2025-03-10 DIAGNOSIS — I48.0 PAF (PAROXYSMAL ATRIAL FIBRILLATION) (H): ICD-10-CM

## 2025-03-10 LAB — INR BLD: 2.7 (ref 0.9–1.1)

## 2025-03-10 PROCEDURE — 36416 COLLJ CAPILLARY BLOOD SPEC: CPT

## 2025-03-10 PROCEDURE — 85610 PROTHROMBIN TIME: CPT

## 2025-03-10 NOTE — PROGRESS NOTES
ANTICOAGULATION MANAGEMENT     Todd S Aschoff 69 year old male is on warfarin with subtherapeutic INR result. (Goal INR Other - see comment)    Recent labs: (last 7 days)     03/10/25  1133   INR 2.7*       ASSESSMENT     Source(s): Chart Review and Patient/Caregiver Call     Warfarin doses taken: Warfarin taken as instructed  Diet: No new diet changes identified, however, patient reports he did have asparagus (6 diego) 3/8/25  Medication/supplement changes: None noted  New illness, injury, or hospitalization: No  Signs or symptoms of bleeding or clotting: No  Previous result: Subtherapeutic  Additional findings: None       PLAN     Recommended plan for no diet, medication or health factor changes affecting INR     Dosing Instructions: Increase your warfarin dose (3% change) with next INR in 2 weeks       Summary  As of 3/10/2025      Full warfarin instructions:  5 mg every Sun; 6.25 mg all other days   Next INR check:  3/24/2025               Telephone call with Nicko who agrees to plan and repeated back plan correctly    Lab visit scheduled    Education provided: Please call back if any changes to your diet, medications or how you've been taking warfarin  Contact [phone] with any changes, questions or concerns.     Plan made with [facility] Pharmacist Jazmine Andrew RN  3/10/2025  Anticoagulation Clinic  [employer] for routing messages: tamanna GUERRERO  [facility] patient phone line: [phone]        _______________________________________________________________________     Anticoagulation Episode Summary       Current INR goal:  Other - see comment   TTR:  31.8% (12 mo)   Target end date:  Indefinite   Send INR reminders to:  AIDA GUERRERO    Indications    PAF (paroxysmal atrial fibrillation) (H) [I48.0]  Mechanical AV Replacement 1992 -- on Warfarin  [Z95.2]             Comments:  3/20/24-goal 2.8-3.5 (pt had a stroke with INR of 2.5)             Anticoagulation Care Providers       Provider Role  Specialty Phone number    Obdulio Ingram MD Referring Internal Medicine [phone]    Herve Morgan MD Referring Internal Medicine - Pediatrics [phone]

## 2025-03-24 ENCOUNTER — ANTICOAGULATION THERAPY VISIT (OUTPATIENT)
Dept: ANTICOAGULATION | Facility: CLINIC | Age: 70
End: 2025-03-24

## 2025-03-24 ENCOUNTER — LAB (OUTPATIENT)
Dept: LAB | Facility: CLINIC | Age: 70
End: 2025-03-24
Payer: COMMERCIAL

## 2025-03-24 DIAGNOSIS — Z95.2 AORTIC VALVE PROSTHESIS PRESENT: ICD-10-CM

## 2025-03-24 DIAGNOSIS — I48.0 PAF (PAROXYSMAL ATRIAL FIBRILLATION) (H): ICD-10-CM

## 2025-03-24 DIAGNOSIS — I48.0 PAF (PAROXYSMAL ATRIAL FIBRILLATION) (H): Primary | ICD-10-CM

## 2025-03-24 LAB — INR BLD: 4.3 (ref 0.9–1.1)

## 2025-03-24 PROCEDURE — 85610 PROTHROMBIN TIME: CPT

## 2025-03-24 PROCEDURE — 36416 COLLJ CAPILLARY BLOOD SPEC: CPT

## 2025-03-24 NOTE — PROGRESS NOTES
ANTICOAGULATION MANAGEMENT     Todd S Aschoff 69 year old male is on warfarin with supratherapeutic INR result. (Goal INR Other - see comment)    Recent labs: (last 7 days)     03/24/25  1138   INR 4.3*       ASSESSMENT     Source(s): Chart Review and Patient/Caregiver Call     Warfarin doses taken: Warfarin taken differently, but did not change total weekly dose - was taking 5 mg dose on Fridays rather than Sun  Diet: No new diet changes identified  Medication/supplement changes: None noted  New illness, injury, or hospitalization: No  Signs or symptoms of bleeding or clotting: No  Previous result: Subtherapeutic  Additional findings: None       PLAN     Recommended plan for no diet, medication or health factor changes affecting INR     Dosing Instructions: decrease your warfarin dose (2.9% change) with next INR in 2 weeks       Summary  As of 3/24/2025      Full warfarin instructions:  5 mg every Mon, Thu; 6.25 mg all other days   Next INR check:  4/7/2025               Telephone call with Nicko who verbalizes understanding and agrees to plan    Lab visit scheduled    Education provided: Please call back if any changes to your diet, medications or how you've been taking warfarin    Plan made with [facility] Pharmacist Jazmine Andrew RN  3/24/2025  Anticoagulation Clinic  [employer] for routing messages: tamanna GUERRERO  [facility] patient phone line: [phone]        _______________________________________________________________________     Anticoagulation Episode Summary       Current INR goal:  Other - see comment   TTR:  30.7% (1 y)   Target end date:  Indefinite   Send INR reminders to:  AIDA GUERRERO    Indications    PAF (paroxysmal atrial fibrillation) (H) [I48.0]  Mechanical AV Replacement 1992 -- on Warfarin  [Z95.2]             Comments:  3/20/24-goal 2.8-3.5 (pt had a stroke with INR of 2.5)             Anticoagulation Care Providers       Provider Role Specialty Phone number     Obdulio Ingram MD Referring Internal Medicine [phone]    Herve Morgan MD Referring Internal Medicine - Pediatrics [phone]

## 2025-04-02 DIAGNOSIS — N40.1 BPH WITH OBSTRUCTION/LOWER URINARY TRACT SYMPTOMS: ICD-10-CM

## 2025-04-02 DIAGNOSIS — N32.81 OAB (OVERACTIVE BLADDER): ICD-10-CM

## 2025-04-02 DIAGNOSIS — F33.1 MAJOR DEPRESSIVE DISORDER, RECURRENT EPISODE, MODERATE (H): ICD-10-CM

## 2025-04-02 DIAGNOSIS — N13.8 BPH WITH OBSTRUCTION/LOWER URINARY TRACT SYMPTOMS: ICD-10-CM

## 2025-04-02 RX ORDER — GUANFACINE 1 MG/1
1 TABLET ORAL
Qty: 90 TABLET | Refills: 0 | Status: SHIPPED | OUTPATIENT
Start: 2025-04-02

## 2025-04-06 DIAGNOSIS — M62.830 BACK MUSCLE SPASM: ICD-10-CM

## 2025-04-06 RX ORDER — DIAZEPAM 5 MG/1
5 TABLET ORAL
Qty: 30 TABLET | Refills: 0 | Status: SHIPPED | OUTPATIENT
Start: 2025-04-06

## 2025-04-07 ENCOUNTER — LAB (OUTPATIENT)
Dept: LAB | Facility: CLINIC | Age: 70
End: 2025-04-07
Payer: COMMERCIAL

## 2025-04-07 ENCOUNTER — ANTICOAGULATION THERAPY VISIT (OUTPATIENT)
Dept: ANTICOAGULATION | Facility: CLINIC | Age: 70
End: 2025-04-07

## 2025-04-07 DIAGNOSIS — Z95.2 AORTIC VALVE PROSTHESIS PRESENT: ICD-10-CM

## 2025-04-07 DIAGNOSIS — I48.0 PAF (PAROXYSMAL ATRIAL FIBRILLATION) (H): Primary | ICD-10-CM

## 2025-04-07 DIAGNOSIS — I48.0 PAF (PAROXYSMAL ATRIAL FIBRILLATION) (H): ICD-10-CM

## 2025-04-07 LAB — INR BLD: 3.2 (ref 0.9–1.1)

## 2025-04-07 PROCEDURE — 36416 COLLJ CAPILLARY BLOOD SPEC: CPT

## 2025-04-07 PROCEDURE — 85610 PROTHROMBIN TIME: CPT

## 2025-04-07 NOTE — PROGRESS NOTES
ANTICOAGULATION MANAGEMENT     Todd S Aschoff 69 year old male is on warfarin with therapeutic INR result. (Goal INR Other - see comment)    Recent labs: (last 7 days)     04/07/25  1131   INR 3.2*       ASSESSMENT     Source(s): Chart Review and Patient/Caregiver Call     Warfarin doses taken: Warfarin taken as instructed  Diet: No new diet changes identified  Medication/supplement changes: None noted  New illness, injury, or hospitalization: No  Signs or symptoms of bleeding or clotting: No  Previous result: Supratherapeutic  Additional findings: None       PLAN     Recommended plan for no diet, medication or health factor changes affecting INR     Dosing Instructions: Continue your current warfarin dose with next INR in 2 weeks       Summary  As of 4/7/2025      Full warfarin instructions:  5 mg every Mon, Thu; 6.25 mg all other days   Next INR check:  4/21/2025               Telephone call with Nicko who verbalizes understanding and agrees to plan    Lab visit scheduled    Education provided: Please call back if any changes to your diet, medications or how you've been taking warfarin  Contact [phone] with any changes, questions or concerns.     Plan made per [facility] anticoagulation protocol    Lucina Andrew RN  4/7/2025  Anticoagulation Clinic  [employer] for routing messages: tamanna GUERRERO  [facility] patient phone line: [phone]        _______________________________________________________________________     Anticoagulation Episode Summary       Current INR goal:  Other - see comment   TTR:  29.4% (1 y)   Target end date:  Indefinite   Send INR reminders to:  AIDA GUERRERO    Indications    PAF (paroxysmal atrial fibrillation) (H) [I48.0]  Mechanical AV Replacement 1992 -- on Warfarin  [Z95.2]             Comments:  3/20/24-goal 2.8-3.5 (pt had a stroke with INR of 2.5)             Anticoagulation Care Providers       Provider Role Specialty Phone number    Obdulio Ingram MD Referring Internal Medicine  [phone]    Herve Morgan MD Referring Internal Medicine - Pediatrics [phone]

## 2025-04-14 RX ORDER — MIRABEGRON 50 MG/1
50 TABLET, FILM COATED, EXTENDED RELEASE ORAL DAILY
Qty: 30 TABLET | Refills: 0 | Status: SHIPPED | OUTPATIENT
Start: 2025-04-14

## 2025-04-22 DIAGNOSIS — I48.20 CHRONIC ATRIAL FIBRILLATION (H): ICD-10-CM

## 2025-04-22 RX ORDER — PROPAFENONE HYDROCHLORIDE 150 MG/1
150 TABLET, COATED ORAL EVERY 8 HOURS
Qty: 90 TABLET | Refills: 0 | Status: SHIPPED | OUTPATIENT
Start: 2025-04-22

## 2025-04-22 NOTE — LETTER
April 22, 2025       TO: Todd S Aschoff  [address]  [place] [address]       Dear Todd Aschoff,    We recently received a call from your pharmacy requesting a refill of your Rythmol.    Our records indicate that you are due for follow-up with your Heart Care Provider. We previously sent you a letter in October 2024 indicating the need for any office visit.  We have refilled this medication for an additional 30 days, you will need to make an office visit to see Dr. Miles before any further refills can be provided.  If you do not wish to see cardiology, you may request that your primary doctor manage this medication.    Please call [phone] to schedule your appointment.    Thank you for allowing [facility] to be a part of your health care team and we look forward to seeing you soon.    Thank you,    [facility]

## 2025-04-22 NOTE — TELEPHONE ENCOUNTER
[facility] Cardiology Refill Guideline reviewed.  Medication does not meet criteria for refill due to patient is overdue for follow-up. Patient was last seen on 9/1/23. Patient received a 90 day supply and refill letter on 10/23/24. No appointment is scheduled.  Messaged to providers team for further review.

## 2025-04-22 NOTE — TELEPHONE ENCOUNTER
Patient given 30 supply with return [employer] message indicating no further refills after this unless an office visit is made.  Stephani Mcdonald RN on 4/22/2025 at 10:24 AM

## 2025-04-24 ENCOUNTER — LAB (OUTPATIENT)
Dept: LAB | Facility: CLINIC | Age: 70
End: 2025-04-24
Payer: COMMERCIAL

## 2025-04-24 ENCOUNTER — ANTICOAGULATION THERAPY VISIT (OUTPATIENT)
Dept: ANTICOAGULATION | Facility: CLINIC | Age: 70
End: 2025-04-24

## 2025-04-24 DIAGNOSIS — I48.0 PAF (PAROXYSMAL ATRIAL FIBRILLATION) (H): Primary | ICD-10-CM

## 2025-04-24 DIAGNOSIS — Z95.2 AORTIC VALVE PROSTHESIS PRESENT: ICD-10-CM

## 2025-04-24 DIAGNOSIS — I48.0 PAF (PAROXYSMAL ATRIAL FIBRILLATION) (H): ICD-10-CM

## 2025-04-24 LAB — INR BLD: 2.3 (ref 0.9–1.1)

## 2025-04-24 RX ORDER — WARFARIN SODIUM 2.5 MG/1
1.25 TABLET ORAL DAILY
Qty: 45 TABLET | Refills: 1 | Status: SHIPPED | OUTPATIENT
Start: 2025-04-24

## 2025-04-24 NOTE — PROGRESS NOTES
ANTICOAGULATION MANAGEMENT     Todd S Aschoff 69 year old male is on warfarin with subtherapeutic INR result. (Goal INR  2.8-3.5 )    Recent labs: (last 7 days)     04/24/25  1420   INR 2.3*       ASSESSMENT     Source(s): Chart Review and Patient/Caregiver Call     Warfarin doses taken: Warfarin taken as instructed  Diet: No new diet changes identified  Medication/supplement changes:  patient reports he did not take his supplements x 4 weeks, resumed them on 4.21/25--multi-vitamin (he will check for vitamin K); folic acid, B complex and fish oil   New illness, injury, or hospitalization: No  Signs or symptoms of bleeding or clotting: No  Previous result: Therapeutic last visit; previously outside of goal range  Additional findings: Refill needed today. Nicko meets all criteria for refill (current [facility] referral, visit with referring provider/group in last 15 months unless directed to return in 2 years in last referring provider visit note, lab monitoring up to date or not exceeding 2 weeks overdue). Rx instructions and quantity supplied updated to match patient's current dosing plan. Warfarin 90 day supply with 1 refill granted per [facility] protocol        PLAN     Recommended plan for ongoing change(s) affecting INR     Dosing Instructions: Increase your warfarin dose (3% change) with next INR in 2 weeks       Summary  As of 4/24/2025      Full warfarin instructions:  5 mg every Mon; 6.25 mg all other days   Next INR check:  5/8/2025               Telephone call with Nicko who verbalizes understanding and agrees to plan and who agrees to plan and repeated back plan correctly    Lab visit scheduled    Education provided: Taking warfarin: prescribed tablet strength and color and Importance of taking warfarin as instructed  Check multi-vitamin for vitamin K content    Plan made with [facility] Pharmacist Nolvia Smalls, RN  4/24/2025  Anticoagulation Clinic  [employer] for routing messages: tamanna GUERRERO  [facility]  patient phone line: [phone]        _______________________________________________________________________     Anticoagulation Episode Summary       Current INR goal:  Other - see comment   TTR:  32.2% (1 y)   Target end date:  Indefinite   Send INR reminders to:  AIDA GUERRERO    Indications    PAF (paroxysmal atrial fibrillation) (H) [I48.0]  Mechanical AV Replacement 1992 -- on Warfarin  [Z95.2]             Comments:  3/20/24-goal 2.8-3.5 (pt had a stroke with INR of 2.5)             Anticoagulation Care Providers       Provider Role Specialty Phone number    Obdulio Ingram MD Referring Internal Medicine [phone]    Herve Morgan MD Referring Internal Medicine - Pediatrics [phone]

## 2025-05-01 DIAGNOSIS — R41.3 MEMORY DIFFICULTIES: ICD-10-CM

## 2025-05-01 RX ORDER — DONEPEZIL HYDROCHLORIDE 10 MG/1
10 TABLET, FILM COATED ORAL AT BEDTIME
Qty: 90 TABLET | Refills: 3 | Status: SHIPPED | OUTPATIENT
Start: 2025-05-01

## 2025-05-06 DIAGNOSIS — I48.0 PAF (PAROXYSMAL ATRIAL FIBRILLATION) (H): ICD-10-CM

## 2025-05-06 DIAGNOSIS — I63.9 CEREBROVASCULAR ACCIDENT (CVA), UNSPECIFIED MECHANISM (H): ICD-10-CM

## 2025-05-06 DIAGNOSIS — M62.830 BACK MUSCLE SPASM: ICD-10-CM

## 2025-05-06 DIAGNOSIS — Z95.2 AORTIC VALVE PROSTHESIS PRESENT: ICD-10-CM

## 2025-05-06 RX ORDER — DIAZEPAM 5 MG/1
5 TABLET ORAL
Qty: 30 TABLET | Refills: 0 | Status: SHIPPED | OUTPATIENT
Start: 2025-05-06

## 2025-05-06 RX ORDER — WARFARIN SODIUM 5 MG/1
TABLET ORAL
Qty: 130 TABLET | Refills: 3 | Status: SHIPPED | OUTPATIENT
Start: 2025-05-06

## 2025-05-13 DIAGNOSIS — N13.8 BPH WITH OBSTRUCTION/LOWER URINARY TRACT SYMPTOMS: ICD-10-CM

## 2025-05-13 DIAGNOSIS — N40.1 BPH WITH OBSTRUCTION/LOWER URINARY TRACT SYMPTOMS: ICD-10-CM

## 2025-05-13 DIAGNOSIS — N32.81 OAB (OVERACTIVE BLADDER): ICD-10-CM

## 2025-05-15 ENCOUNTER — TELEPHONE (OUTPATIENT)
Dept: ANTICOAGULATION | Facility: CLINIC | Age: 70
End: 2025-05-15
Payer: COMMERCIAL

## 2025-05-15 RX ORDER — MIRABEGRON 50 MG/1
50 TABLET, FILM COATED, EXTENDED RELEASE ORAL DAILY
Qty: 90 TABLET | Refills: 0 | Status: SHIPPED | OUTPATIENT
Start: 2025-05-15

## 2025-05-15 NOTE — TELEPHONE ENCOUNTER
ANTICOAGULATION     Nicko S Aschoff is overdue for an INR check.     Left message for patient to call and schedule lab appointment as soon as possible. If returning call, please schedule.     Amanda Wheeler RN  5/15/2025  Anticoagulation Clinic  [facility] for routing messages: tamanna GUERRERO  [facility] patient phone line: [phone]

## 2025-05-21 ENCOUNTER — LAB (OUTPATIENT)
Dept: LAB | Facility: CLINIC | Age: 70
End: 2025-05-21
Payer: COMMERCIAL

## 2025-05-21 ENCOUNTER — ANTICOAGULATION THERAPY VISIT (OUTPATIENT)
Dept: ANTICOAGULATION | Facility: CLINIC | Age: 70
End: 2025-05-21

## 2025-05-21 ENCOUNTER — RESULTS FOLLOW-UP (OUTPATIENT)
Dept: ANTICOAGULATION | Facility: CLINIC | Age: 70
End: 2025-05-21

## 2025-05-21 DIAGNOSIS — Z95.2 AORTIC VALVE PROSTHESIS PRESENT: ICD-10-CM

## 2025-05-21 DIAGNOSIS — I48.0 PAF (PAROXYSMAL ATRIAL FIBRILLATION) (H): ICD-10-CM

## 2025-05-21 DIAGNOSIS — I48.0 PAF (PAROXYSMAL ATRIAL FIBRILLATION) (H): Primary | ICD-10-CM

## 2025-05-21 LAB — INR BLD: 4.2 (ref 0.9–1.1)

## 2025-05-21 PROCEDURE — 85610 PROTHROMBIN TIME: CPT

## 2025-05-21 PROCEDURE — 36416 COLLJ CAPILLARY BLOOD SPEC: CPT

## 2025-05-21 NOTE — PROGRESS NOTES
"ANTICOAGULATION MANAGEMENT     Todd S Aschoff 69 year old male is on warfarin with supratherapeutic INR result. (Goal INR 2.8-3.5)    Recent labs: (last 7 days)     05/21/25  1020   INR 4.2*       ASSESSMENT     Source(s): Chart Review and Patient/Caregiver Call     Warfarin doses taken: Warfarin taken as instructed  Diet: No new diet changes identified--patient reports he avoids greens, this is nothing new  Medication/supplement changes: Patient reports he discontinued his multi-vitamin with \"19%\" vitamin K after last INR. Patient is agreeable to resuming the multi-vitamin and will take it everyday. Writer explained rationale   New illness, injury, or hospitalization: No  Signs or symptoms of bleeding or clotting: No  Previous result: Subtherapeutic  Additional findings: patient reports he just refilled the 2.5 mg warfarin tablets, is utilizing both 2.5 and 5 mg tablets. [facility] Jazmine suggested we change from 2.5 to 2 mg tablets with next refill to allow for less aggressive dose changes.       PLAN     Recommended plan for temporary change(s) affecting INR     Dosing Instructions: partial hold then continue your current warfarin dose with next INR in 2 weeks       Summary  As of 5/21/2025      Full warfarin instructions:  5/21: 5 mg; Otherwise 5 mg every Mon; 6.25 mg all other days   Next INR check:  6/4/2025               Telephone call with Nicko who verbalizes understanding and agrees to plan and who agrees to plan and repeated back plan correctly    Lab visit scheduled    Education provided: Taking warfarin: prescribed tablet strength and color and Importance of taking warfarin as instructed  Dietary considerations: importance of consistent vitamin K intake and impact of vitamin K foods on INR    Plan made with [facility] Pharmacist Jazmine Smalls, RN  5/21/2025  Anticoagulation Clinic  HERMEL DELOR for routing messages: tamanna GUERRERO  [facility] patient phone line: " [phone]        _______________________________________________________________________     Anticoagulation Episode Summary       Current INR goal:  Other - see comment   TTR:  28.5% (1 y)   Target end date:  Indefinite   Send INR reminders to:  AIDA GUERRERO    Indications    PAF (paroxysmal atrial fibrillation) (H) [I48.0]  Mechanical AV Replacement 1992 -- on Warfarin  [Z95.2]             Comments:  3/20/24-goal 2.8-3.5 (pt had a stroke with INR of 2.5)             Anticoagulation Care Providers       Provider Role Specialty Phone number    Obdulio Ingram MD Referring Internal Medicine [phone]    Herve Morgan MD Referring Internal Medicine - Pediatrics [phone]

## 2025-06-04 ENCOUNTER — RESULTS FOLLOW-UP (OUTPATIENT)
Dept: ANTICOAGULATION | Facility: CLINIC | Age: 70
End: 2025-06-04

## 2025-06-04 ENCOUNTER — LAB (OUTPATIENT)
Dept: LAB | Facility: CLINIC | Age: 70
End: 2025-06-04
Payer: COMMERCIAL

## 2025-06-04 ENCOUNTER — ANTICOAGULATION THERAPY VISIT (OUTPATIENT)
Dept: ANTICOAGULATION | Facility: CLINIC | Age: 70
End: 2025-06-04

## 2025-06-04 DIAGNOSIS — I48.0 PAF (PAROXYSMAL ATRIAL FIBRILLATION) (H): Primary | ICD-10-CM

## 2025-06-04 DIAGNOSIS — I48.0 PAF (PAROXYSMAL ATRIAL FIBRILLATION) (H): ICD-10-CM

## 2025-06-04 DIAGNOSIS — Z95.2 AORTIC VALVE PROSTHESIS PRESENT: ICD-10-CM

## 2025-06-04 LAB — INR BLD: 2.9 (ref 0.9–1.1)

## 2025-06-04 PROCEDURE — 85610 PROTHROMBIN TIME: CPT

## 2025-06-04 PROCEDURE — 36416 COLLJ CAPILLARY BLOOD SPEC: CPT

## 2025-06-04 NOTE — PROGRESS NOTES
ANTICOAGULATION MANAGEMENT     Todd S Aschoff 69 year old male is on warfarin with therapeutic INR result. (Goal INR Other - 2.8-3.5)    Recent labs: (last 7 days)     06/04/25  1108   INR 2.9*       ASSESSMENT     Source(s): Chart Review and Patient/Caregiver Call     Warfarin doses taken: Warfarin taken as instructed  Diet: No new diet changes identified  Medication/supplement changes: Restarted his MVI with vitamin K 3 times a week (Mon, Wed, Fri) and plans to continue this regimen.  New illness, injury, or hospitalization: No  Signs or symptoms of bleeding or clotting: No  Previous result: Supratherapeutic  Additional findings: None       PLAN     Recommended plan for no diet, medication or health factor changes affecting INR     Dosing Instructions: Continue your current warfarin dose with next INR in 3 weeks       Summary  As of 6/4/2025      Full warfarin instructions:  5 mg every Mon; 6.25 mg all other days   Next INR check:  6/25/2025               Telephone call with Nicko who agrees to plan and repeated back plan correctly    Lab visit scheduled    Education provided: Please call back if any changes to your diet, medications or how you've been taking warfarin    Plan made per [facility] anticoagulation protocol    Amanda Wheeler, RN  6/4/2025  Anticoagulation Clinic  [employer] for routing messages: tamanna GUERRERO  [facility] patient phone line: [phone]        _______________________________________________________________________     Anticoagulation Episode Summary       Current INR goal:  Other - see comment   TTR:  28.3% (1 y)   Target end date:  Indefinite   Send INR reminders to:  AIDA GUERRERO    Indications    PAF (paroxysmal atrial fibrillation) (H) [I48.0]  Mechanical AV Replacement 1992 -- on Warfarin  [Z95.2]             Comments:  3/20/24-goal 2.8-3.5 (pt had a stroke with INR of 2.5)             Anticoagulation Care Providers       Provider Role Specialty Phone number    Obdulio Ingram MD Referring  Internal Medicine [phone]    Herve Morgan MD Referring Internal Medicine - Pediatrics [phone]

## 2025-06-18 DIAGNOSIS — M62.830 BACK MUSCLE SPASM: ICD-10-CM

## 2025-06-19 RX ORDER — DIAZEPAM 5 MG/1
5 TABLET ORAL
Qty: 15 TABLET | Refills: 0 | Status: SHIPPED | OUTPATIENT
Start: 2025-06-19

## 2025-06-20 NOTE — TELEPHONE ENCOUNTER
Prescription approved per [facility] Refill Protocol.     Refer to the Assessment tab to view/cancel completed assessment.

## 2025-06-23 ENCOUNTER — TELEPHONE (OUTPATIENT)
Dept: PEDIATRICS | Facility: CLINIC | Age: 70
End: 2025-06-23
Payer: COMMERCIAL

## 2025-06-25 ENCOUNTER — LAB (OUTPATIENT)
Dept: LAB | Facility: CLINIC | Age: 70
End: 2025-06-25
Payer: COMMERCIAL

## 2025-06-25 ENCOUNTER — RESULTS FOLLOW-UP (OUTPATIENT)
Dept: ANTICOAGULATION | Facility: CLINIC | Age: 70
End: 2025-06-25

## 2025-06-25 ENCOUNTER — ANTICOAGULATION THERAPY VISIT (OUTPATIENT)
Dept: ANTICOAGULATION | Facility: CLINIC | Age: 70
End: 2025-06-25

## 2025-06-25 DIAGNOSIS — I48.0 PAF (PAROXYSMAL ATRIAL FIBRILLATION) (H): Primary | ICD-10-CM

## 2025-06-25 DIAGNOSIS — I48.0 PAF (PAROXYSMAL ATRIAL FIBRILLATION) (H): ICD-10-CM

## 2025-06-25 DIAGNOSIS — Z95.2 AORTIC VALVE PROSTHESIS PRESENT: ICD-10-CM

## 2025-06-25 LAB — INR BLD: 3 (ref 0.9–1.1)

## 2025-06-25 PROCEDURE — 36416 COLLJ CAPILLARY BLOOD SPEC: CPT

## 2025-06-25 PROCEDURE — 85610 PROTHROMBIN TIME: CPT

## 2025-06-25 NOTE — PROGRESS NOTES
ANTICOAGULATION MANAGEMENT     Todd S Aschoff 69 year old male is on warfarin with therapeutic INR result. (Goal INR 2.8-3.5)    Recent labs: (last 7 days)     06/25/25  1101   INR 3.0*       ASSESSMENT     Source(s): Chart Review and Patient/Caregiver Call     Warfarin doses taken: Warfarin taken as instructed  Diet: No new diet changes identified  Medication/supplement changes: None noted  New illness, injury, or hospitalization: No  Signs or symptoms of bleeding or clotting: No  Previous result: Therapeutic last visit; previously outside of goal range  Additional findings: None       PLAN     Recommended plan for no diet, medication or health factor changes affecting INR     Dosing Instructions: Continue your current warfarin dose with next INR in 4 weeks       Summary  As of 6/25/2025      Full warfarin instructions:  5 mg every Mon; 6.25 mg all other days   Next INR check:  7/23/2025               Telephone call with Nicko who verbalizes understanding and agrees to plan    Lab visit scheduled    Education provided: Taking warfarin: Importance of taking warfarin as instructed  Continue taking your multi-vitamin every MWF, patient verbalized understanding    Plan made per [facility] anticoagulation protocol    Angely Smalls RN  6/25/2025  Anticoagulation Clinic  Crystal IS for routing messages: tamanna GUERRERO  [facility] patient phone line: [phone]        _______________________________________________________________________     Anticoagulation Episode Summary       Current INR goal:  Other - see comment   TTR:  34.1% (1 y)   Target end date:  Indefinite   Send INR reminders to:  AIDA GUERRERO    Indications    PAF (paroxysmal atrial fibrillation) (H) [I48.0]  Mechanical AV Replacement 1992 -- on Warfarin  [Z95.2]             Comments:  3/20/24-goal 2.8-3.5 (pt had a stroke with INR of 2.5)             Anticoagulation Care Providers       Provider Role Specialty Phone number    Obdulio Ingram MD Referring Internal  Medicine [phone]    Herve Morgan MD Referring Internal Medicine - Pediatrics [phone]

## 2025-06-30 DIAGNOSIS — E03.9 ACQUIRED HYPOTHYROIDISM: ICD-10-CM

## 2025-06-30 DIAGNOSIS — M62.830 BACK MUSCLE SPASM: ICD-10-CM

## 2025-06-30 RX ORDER — LEVOTHYROXINE SODIUM 175 UG/1
175 TABLET ORAL DAILY
Qty: 90 TABLET | Refills: 3 | Status: SHIPPED | OUTPATIENT
Start: 2025-06-30

## 2025-06-30 RX ORDER — DIAZEPAM 5 MG/1
5 TABLET ORAL
Qty: 30 TABLET | Refills: 0 | Status: SHIPPED | OUTPATIENT
Start: 2025-06-30

## 2025-06-30 NOTE — TELEPHONE ENCOUNTER
Scheduled AWV, Pt requested this be scheduled instead.     Maira Thomason on 6/30/2025 at 10:43 AM

## 2025-07-05 DIAGNOSIS — I63.89 RIGHT TEMPORAL LOBE INFARCTION (H): ICD-10-CM

## 2025-07-05 DIAGNOSIS — F32.A DEPRESSION, UNSPECIFIED DEPRESSION TYPE: ICD-10-CM

## 2025-07-05 DIAGNOSIS — F33.1 MAJOR DEPRESSIVE DISORDER, RECURRENT EPISODE, MODERATE (H): ICD-10-CM

## 2025-07-07 RX ORDER — GUANFACINE 1 MG/1
1 TABLET ORAL
Qty: 90 TABLET | Refills: 0 | Status: SHIPPED | OUTPATIENT
Start: 2025-07-07

## 2025-07-07 RX ORDER — VENLAFAXINE HYDROCHLORIDE 75 MG/1
225 CAPSULE, EXTENDED RELEASE ORAL DAILY
Qty: 270 CAPSULE | Refills: 3 | Status: SHIPPED | OUTPATIENT
Start: 2025-07-07

## 2025-07-07 RX ORDER — ATORVASTATIN CALCIUM 40 MG/1
40 TABLET, FILM COATED ORAL EVERY EVENING
Qty: 90 TABLET | Refills: 0 | Status: SHIPPED | OUTPATIENT
Start: 2025-07-07

## 2025-07-22 ENCOUNTER — ANTICOAGULATION THERAPY VISIT (OUTPATIENT)
Dept: ANTICOAGULATION | Facility: CLINIC | Age: 70
End: 2025-07-22

## 2025-07-22 ENCOUNTER — LAB (OUTPATIENT)
Dept: LAB | Facility: CLINIC | Age: 70
End: 2025-07-22
Payer: COMMERCIAL

## 2025-07-22 DIAGNOSIS — I48.0 PAF (PAROXYSMAL ATRIAL FIBRILLATION) (H): ICD-10-CM

## 2025-07-22 DIAGNOSIS — I48.0 PAF (PAROXYSMAL ATRIAL FIBRILLATION) (H): Primary | ICD-10-CM

## 2025-07-22 DIAGNOSIS — Z95.2 AORTIC VALVE PROSTHESIS PRESENT: ICD-10-CM

## 2025-07-22 LAB — INR BLD: 3.2 (ref 0.9–1.1)

## 2025-07-22 PROCEDURE — 85610 PROTHROMBIN TIME: CPT

## 2025-07-22 PROCEDURE — 36416 COLLJ CAPILLARY BLOOD SPEC: CPT

## 2025-07-22 NOTE — PROGRESS NOTES
ANTICOAGULATION MANAGEMENT     Todd S Aschoff 69 year old male is on warfarin with therapeutic INR result. (Goal INR Other - see comment)    Recent labs: (last 7 days)     07/22/25  1140   INR 3.2*       ASSESSMENT     Source(s): Chart Review and Patient/Caregiver Call     Warfarin doses taken: Warfarin taken as instructed  Diet: No new diet changes identified  Medication/supplement changes: None noted  New illness, injury, or hospitalization: No  Signs or symptoms of bleeding or clotting: No  Previous result: Therapeutic last 2(+) visits  Additional findings: None       PLAN     Recommended plan for no diet, medication or health factor changes affecting INR     Dosing Instructions: Continue your current warfarin dose with next INR in 5 weeks       Summary  As of 7/22/2025      Full warfarin instructions:  5 mg every Mon; 6.25 mg all other days   Next INR check:  8/26/2025               Telephone call with Nicko who verbalizes understanding and agrees to plan    Lab visit scheduled    Education provided: Please call back if any changes to your diet, medications or how you've been taking warfarin    Plan made per [facility] anticoagulation protocol    Marianne Andrew RN  7/22/2025  Anticoagulation Clinic  [employer] for routing messages: tamanna GUERRERO  [facility] patient phone line: [phone]        _______________________________________________________________________     Anticoagulation Episode Summary       Current INR goal:  Other - see comment   TTR:  34.1% (1 y)   Target end date:  Indefinite   Send INR reminders to:  AIDA GUERRERO    Indications    PAF (paroxysmal atrial fibrillation) (H) [I48.0]  Mechanical AV Replacement 1992 -- on Warfarin  [Z95.2]             Comments:  3/20/24-goal 2.8-3.5 (pt had a stroke with INR of 2.5)             Anticoagulation Care Providers       Provider Role Specialty Phone number    Obdulio Ingram MD Referring Internal Medicine [phone]    Herve Morgan MD Referring  Internal Medicine - Pediatrics [phone]

## 2025-07-29 ENCOUNTER — APPOINTMENT (OUTPATIENT)
Dept: LAB | Facility: CLINIC | Age: 70
End: 2025-07-29
Payer: COMMERCIAL

## 2025-07-29 ENCOUNTER — OFFICE VISIT (OUTPATIENT)
Dept: UROLOGY | Facility: CLINIC | Age: 70
End: 2025-07-29
Payer: COMMERCIAL

## 2025-07-29 VITALS
HEIGHT: 72 IN | BODY MASS INDEX: 42.66 KG/M2 | DIASTOLIC BLOOD PRESSURE: 71 MMHG | WEIGHT: 315 LBS | HEART RATE: 80 BPM | SYSTOLIC BLOOD PRESSURE: 103 MMHG

## 2025-07-29 DIAGNOSIS — N32.81 OAB (OVERACTIVE BLADDER): ICD-10-CM

## 2025-07-29 DIAGNOSIS — N40.1 BPH WITH OBSTRUCTION/LOWER URINARY TRACT SYMPTOMS: ICD-10-CM

## 2025-07-29 DIAGNOSIS — N13.8 BPH WITH OBSTRUCTION/LOWER URINARY TRACT SYMPTOMS: ICD-10-CM

## 2025-07-29 DIAGNOSIS — M62.830 BACK MUSCLE SPASM: ICD-10-CM

## 2025-07-29 PROCEDURE — 36415 COLL VENOUS BLD VENIPUNCTURE: CPT | Performed by: NURSE PRACTITIONER

## 2025-07-29 PROCEDURE — 84153 ASSAY OF PSA TOTAL: CPT | Performed by: NURSE PRACTITIONER

## 2025-07-29 PROCEDURE — 3078F DIAST BP <80 MM HG: CPT | Performed by: NURSE PRACTITIONER

## 2025-07-29 PROCEDURE — 99213 OFFICE O/P EST LOW 20 MIN: CPT | Performed by: NURSE PRACTITIONER

## 2025-07-29 PROCEDURE — 1125F AMNT PAIN NOTED PAIN PRSNT: CPT | Performed by: NURSE PRACTITIONER

## 2025-07-29 PROCEDURE — 3074F SYST BP LT 130 MM HG: CPT | Performed by: NURSE PRACTITIONER

## 2025-07-29 RX ORDER — MIRABEGRON 50 MG/1
50 TABLET, FILM COATED, EXTENDED RELEASE ORAL DAILY
Qty: 90 TABLET | Refills: 4 | Status: SHIPPED | OUTPATIENT
Start: 2025-07-29

## 2025-07-29 RX ORDER — DIAZEPAM 5 MG/1
5 TABLET ORAL
Qty: 30 TABLET | Refills: 0 | Status: SHIPPED | OUTPATIENT
Start: 2025-07-29

## 2025-07-29 ASSESSMENT — PAIN SCALES - GENERAL: PAINLEVEL_OUTOF10: MODERATE PAIN (5)

## 2025-07-29 NOTE — NURSING NOTE
Chief Complaint   Patient presents with    OAB     1 year for med refill      Pt would like to discuss ED. Pt has no issues with urination     Xenia Lopez CMA

## 2025-07-29 NOTE — LETTER
7/29/2025       RE: Todd S Aschoff  [address] [facility] [place] [place] [address]     Dear Colleague,    Thank you for referring your patient, Todd S Aschoff, to the [facility] [place] at [facility]. Please see a copy of my visit note below.      Urology Outpatient Visit    Name: Todd S Aschoff    MRN: [id number]   YOB: 1955               Chief Complaint:   ED         Impression and Plan:   Impression / Plan:   Todd S Aschoff is a 69 year old male with BPH Hx Rezum, OAB on Mirabegron, vasculogenic Erectile dysfunction.      Mirabegron refilled.  ED Tx options discussed in detail. He will think on his options regarding ED Tx.   Lab visit today for screening PSA.  Follow-up 1 yr med refill, Sx recheck, PSA monitoring.    Thank you for the opportunity to participate in the care of Todd S Aschoff.     AMARIS Davis, CNP   [facility] - Department of Urology  [phone]          History of Present Illness:   Todd S Aschoff is a 69 year old male with BPH, OAB (over-active bladder), Erectile dysfunction    history of Elevated PSA. Patient had previously followed with Dr. Gonzales and was referred to Dr. Mock for discussion of possible InterStim or Botox for his overactive bladder symptoms. However he was noted to have an elevated PSA. He underwent a MR fusion biopsy which was based on his prior MRI from 2017.  There was no evidence of prostate cancer.     He is on Warfarin secondary to a heart valve     9/22/21:  Urodynamics (UDS) with Purnima Sutherland  POSTPROCEDURE DIAGNOSES:  -Maximum cystometric capacity ~240 mL with heightened filling sensations.  -Good bladder compliance with high pressure detrusor overactivity (Pdet reaches >100 cm H2O at the peak of these uninhibited detrusor contractions) with associated urgency incontinence starting at bladder volumes >150 mL. He leaks with the first episode of DO but is then able to suppress the  incontinence and the bladder spasm subsides. During the second episode of DO, he is able to suppress incontinence but expresses imminent desire to void and is thus given permission to void.  -Strong detrusor contraction during voiding reaching 109 cm H2O.   -He voids 221 mL with slow flow (Qmax 12 ml/s) and mild incomplete bladder emptying (PVR 20 mL). On fluoroscopy, a small volume of contrast is retained within bilateral bladder diverticuli post-void.   -BOOI is 68.4 which is suggestive for bladder outlet obstruction.  -Fluoroscopy otherwise reveals a moderately trabeculated bladder wall without vesicoureteral reflux. The bladder neck initially appears closed during filling and appears open during episodes of incontinence and voiding.      12/1/21:  REZUM     1/12/22:  He is doing very well after the procedure  Stream is slightly weak  Flow is better   Frequency and urgency is much better  Very happy with the results     TODAY - 7/29/25:  Doing well on mirabegron. No urinary concerns. Would like to discuss issues attaining/maintaining erections x 4yr. Concerned about SE profile of PDE5i's.    History is obtained from patient & EMR          Past Medical History:     Past Medical History:   Diagnosis Date     Advanced directives, counseling/discussion 1/6/2012    Discussed Advance Directive planning with patient; information given to patient to review.     Alcohol dependence (H)      Alcohol dependence in remission (H) 8/2/2018     Allergy, unspecified not elsewhere classified     seasonal     Anemia, unspecified type 1/22/2021     Aortic valve prosthesis present 2/8/2021     Atrial fibrillation (H)      Benign prostatic hyperplasia 2/8/2021     Bilateral thoracic back pain 11/16/2016     BPH (benign prostatic hypertrophy)      Chronic atrial fibrillation (H) 8/7/2002     Chronic infection     low back wound incision not healing      Chronic low back pain 8/19/2011     Chronic pain     lower back and right leg and  left leg     Depressive disorder 6/9/2010    Depression  NOS     Dissection of aorta, thoracic (H) 1992    [employer] Jose F aotic valve + arch graft 1992     Elevated prostate specific antigen (PSA) 1/22/2020     Family history of prostate cancer 1/22/2020     Generalized muscle weakness 1/22/2021     Headache(784.0)      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 Provider to review     Leg wound, left 1/9/2021     Long term current use of anticoagulant therapy 8/7/2002    LW Modifier:  Coumadin, since mechanical aortic valve LW Onset:  1992 ; Anticoagulant Rx Long Term Problem list name updated by automated process. Provider to review     Low back pain      Lumbar radiculopathy 4/3/2013     Lumbar surgical wound fluid collection 5/23/2013     Major depression      Memory difficulties 1/16/2015     Mixed hyperlipidemia      Morbid obesity (H) 4/20/2010    LW Modifier:  BMI 41.3 (Apr 2010) ; Obesity Morbid     Numbness and tingling     right leg post surg rightt hip/ also left leg since surg     OA (osteoarthritis)     hips     OAB (overactive bladder) 5/11/2015     Obesity, unspecified      Persons encountering health services in other specified circumstances 4/3/2012    Formatting of this note might be different from the original. EMERGENCY CARE PLAN Presenting Problem Signs and Symptoms Treatment Plan   Questions or conerns during clinic hours    I will call the clinic directly    Questions or conerns outside clinic hours    I will call the 24 hour nurse line at [phone]   Patient needs to schedule an appointment    I will call the 24 hour scheduling team at     Polypharmacy 1/22/2021     Prostate infection      Radiculitis 4/20/2010    LW Modifier:  Right foot, s/p  R AMANDA LW Onset:  2002 ; Neuralgia     Recurrent major depressive episodes, in full remission 10/30/2012     Renal insufficiency 1/22/2021     Rotator cuff tear 1/26/2015    Rotator cuff tear, right     S/P lumbar fusion 4/5/2018     S/P 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 Jose F's valve     APPLY WOUND VAC Left 1/26/2021    Procedure: Placement of negative pressure wound therapy 2,400 squared centimeters  ;  Surgeon: Lazaro Plascencia MD;  Location:  OR     CATARACT IOL, RT/LT      rt eye only     CHOLECYSTECTOMY, LAPOROSCOPIC  8/10     CLOSE SECONDARY WOUND LOWER EXTREMITY Left 2/3/2021    Procedure:  Split Thickness Skin Graft to Leg of 18 square centimeters  Intermediate Closure of Leg of 8cm   ;  Surgeon: Lazaro Plascencia MD;  Location:  OR     COLONOSCOPY N/A 1/15/2015    Procedure: COLONOSCOPY;  Surgeon: Johnson Kothari MD;  Location:  GI     COLONOSCOPY N/A 12/10/2024    Procedure: Colonoscopy;  Surgeon: Nory Godoy MD;  Location:  GI     DECOMPRESSION LUMBAR ONE LEVEL  4/3/2013    Procedure: DECOMPRESSION LUMBAR ONE LEVEL;  Open Decompression L3-4 bilateral;  Surgeon: Travis Coffey MD;  Location:  OR     DECOMPRESSION, FUSION CERVICAL ANTERIOR ONE LEVEL, COMBINED  3/23/2012    Procedure:COMBINED DECOMPRESSION, FUSION CERVICAL ANTERIOR ONE LEVEL; Anterior Cervical Decompression and Fusion C4-6; Surgeon:TRAVIS COFFEY; Location: OR     ELBOW SURGERY       EXPLORE SPINE,  REMOVE HARDWARE, COMBINED  5/23/2013    Procedure: COMBINED EXPLORE SPINE, REMOVE HARDWARE;  Exploration Lumbar Wound for fluid collection;  Surgeon: Travis Coffey MD;  Location: [facility] OR     FUSION CERVICAL ANTERIOR TWO LEVELS  3/26/2012    Procedure:FUSION CERVICAL ANTERIOR TWO LEVELS; Anterior Cervical Fusion C4-6, Anterior Cervical  Hematoma Evacuation; Surgeon:TRVAIS COFFEY; Location:[facility] OR     IR LUMBAR EPIDURAL STEROID INJECTION  3/28/2003     IRRIGATION AND DEBRIDEMENT LOWER EXTREMITY, COMBINED Left 1/10/2021    Procedure: 1.  Irrigation and excisional debridement to muscle left distal medial calf chronic wounds total area measuring 9 cm x 4 cm 2.  Irrigation and excisional debridement to fascia left medial calf chronic hematoma measuring 29 x 6.7 x 4.2 cm 3.  Primary complex wound closure left medial distal calf 9 cm in length;  Surgeon: Rod Kemp MD;  Location: [facility] OR     IRRIGATION AND DEBRIDEMENT LOWER EXTREMITY, COMBINED Left 1/26/2021    Procedure: Evacuation of hematoma debridement of skin, subcutaneous tissue and muscle 2,400 squared centimeters, placement of negative pressure wound therapy 2,400 squared centimeters;  Surgeon: Lazaro Plascencia MD;  Location: [facility] OR     IRRIGATION AND DEBRIDEMENT SPINE, CLOSE WOUND, COMBINED  3/26/2012    Procedure:COMBINED IRRIGATION AND DEBRIDEMENT SPINE, CLOSE WOUND; Surgeon:TRAVIS COFFEY; Location:[facility] OR     OTHER SURGICAL HISTORY      MO UNLISTED ORBIT     OTHER SURGICAL HISTORY      MO UNLISTED SPINE     OTHER SURGICAL HISTORY      MO UNLISTED NECK/THORAX     OTHER SURGICAL HISTORY      (IA) MO TOTAL HIP ARTHROPLASTY     OTHER SURGICAL HISTORY      (IA) MO NEE SCOPE MED W LAT MENISCECT WWO DEBRIBE/SHAVE ANY CO     removal of cyst of back   2.5week     TONSILLECTOMY       wisdom teeth[       [facility] NONSPECIFIC PROCEDURE  1992    repair of TAA with graft     [facility] TOTAL HIP ARTHROPLASTY  2010    Left AMANDA     [facility] TOTAL HIP ARTHROPLASTY  2002    R  "hip replacement comp's by nerve injury with pain down into R leg, nadya below knee          Social History:     Social History     Tobacco Use     Smoking status: Never     Passive exposure: Past     Smokeless tobacco: Never   Substance Use Topics     Alcohol use: No     Comment: Stopped drinking alcohol ~          Family History:     Family History   Problem Relation Age of Onset     Cerebrovascular Disease Father          age 86, had M.I. ,diabetic also     Prostate Cancer Father      Diabetes Father      Cancer Mother          age 83 of dementia, also h/o lymphoma     Diabetes Mother      Hypertension Brother         2 brothers with hypertension & sleep apnea     Hypertension Sister         Born ~1936     Sleep Apnea Brother          age 78, Alzheimers     No Known Problems Son      No Known Problems Daughter      No Known Problems Son      Family History Negative Brother         Had 5 brothers, three still alive as of      Colon Cancer No family hx of           Allergies:     Allergies   Allergen Reactions     Blood Transfusion Related (Informational Only) Other (See Comments)     Patient has a history of a clinically significant antibody against RBC antigens.  A delay in compatible RBCs may occur.       Ciprofloxacin      \"Went nuts\"     Gabapentin      Severe behavioral disturbances          Medications:     Current Outpatient Medications   Medication Sig Dispense Refill     acetaminophen (TYLENOL) 500 MG tablet Take 1,000 mg by mouth 2 times daily       atorvastatin (LIPITOR) 40 MG tablet Take 1 tablet by mouth every evening 90 tablet 0     azelastine 137 MCG/SPRAY SOLN Spray 1 spray into both nostrils 2 times daily 30 mL 11     bisacodyl (DULCOLAX) 5 MG EC tablet Two days prior to exam take two (2) tablets at 4pm. One day prior to exam take two (2) tablets at 4pm 4 tablet 0     bisacodyl (DULCOLAX) 5 MG EC tablet Two days prior to exam take two (2) tablets at 4pm. One day prior to exam take " two (2) tablets at 4pm 4 tablet 0     cyclobenzaprine (FLEXERIL) 10 MG tablet Take 1 tablet by mouth 3 times daily as needed for muscle spasms 270 tablet 0     diazepam (VALIUM) 5 MG tablet Take 1 tablet (5 mg) by mouth nightly as needed for anxiety. 30 tablet 0     donepezil (ARICEPT) 10 MG tablet Take 1 tablet by mouth At Bedtime 90 tablet 3     enoxaparin ANTICOAGULANT (LOVENOX) 120 MG/0.8ML syringe Inject 0.7 mLs (105 mg) subcutaneously every 12 hours. 22.4 mL 1     folic acid (FOLVITE) 400 MCG tablet Take 400 mcg by mouth daily       guanFACINE (TENEX) 1 MG tablet TAKE ONE TABLET BY MOUTH AT BEDTIME 90 tablet 0     levothyroxine (SYNTHROID/LEVOTHROID) 175 MCG tablet Take 1 tablet by mouth once daily 90 tablet 3     lisinopril (ZESTRIL) 10 MG tablet Take 1 tablet (10 mg) by mouth daily. 90 tablet 3     mirabegron (MYRBETRIQ) 50 MG 24 hr tablet Take 1 tablet by mouth once daily 90 tablet 0     polyethylene glycol (GOLYTELY) 236 g suspension Two days before procedure at 5PM fill first container with water. Mix and drink an 8 oz glass every 15 minutes until HALF of the container is gone. Place the remainder in the refrigerator. One day before procedure at 5PM drink second half of bowel prep. Drink an 8 oz glass every 15 minutes until it is gone. Day of procedure 6 hours before arrival time fill the 2nd container with water. Mix and drink an 8 oz glass every 15 minutes until HALF of the container is gone. Discard the remaining solution. 8000 mL 0     polyethylene glycol (GOLYTELY) 236 g suspension Two days before procedure at 5PM fill first container with water. Mix and drink an 8 oz glass every 15 minutes until HALF of the container is gone. Place the remainder in the refrigerator. One day before procedure at 5PM drink second half of bowel prep. Drink an 8 oz glass every 15 minutes until it is gone. Day of procedure 6 hours before arrival time fill the 2nd container with water. Mix and drink an 8 oz glass every 15  minutes until HALF of the container is gone. Discard the remaining solution. 8000 mL 0     pregabalin (LYRICA) 100 MG capsule Take 1 capsule (100 mg) by mouth 3 times daily. Do not take if sleepy/sedated. 270 capsule 3     propafenone (RYTHMOL) 150 MG TABS tablet Take 1 tablet (150 mg) by mouth every 8 hours. Appointment required for further refills 90 tablet 0     venlafaxine (EFFEXOR XR) 75 MG 24 hr capsule Take 3 capsules by mouth daily 270 capsule 3     warfarin ANTICOAGULANT (COUMADIN) 2.5 MG tablet Take 0.5 tablets (1.25 mg) by mouth daily. TAKE WITH A 5 MG TABLET 6 TIMES PER WEEK OR PER INR CLINIC 45 tablet 1     warfarin ANTICOAGULANT (COUMADIN) 5 MG tablet Take one 5 mg tablet by mouth every day. Combine with half of 2.5 mg tablet on Mon, Wed, Fri for a total of 6.25 mg. 130 tablet 3     No current facility-administered medications for this visit.          Review of Systems:    ROS: See HPI for pertinent details.  Remainder of 10-point ROS negative.         Physical Exam:   VS:  T: Data Unavailable    HR: Data Unavailable    BP: Data Unavailable    RR: Data Unavailable     GEN:  Alert.  NAD.   HEENT:  Sclerae anicteric.    CV:  No obvious jugular venous distension.  LUNGS: No respiratory distress, breathing comfortably wo accessory muscle use.  ABD:  ND.     SKIN:  Dry. No visible rashes.   NEURO:  CN grossly intact.          Laboratory Data:     Lab Results   Component Value Date    PSA 2.16 09/16/2024    PSA 2.70 03/13/2024    PSA 4.80 07/26/2021    PSA 6.93 01/14/2020    PSA 6.28 10/29/2019    PSA 3.84 06/12/2018    PSA 5.84 02/22/2017    PSA 4.57 01/08/2016    PSA 3.37 12/24/2014    PSA 3.70 12/17/2013    PSA 4.08 10/31/2012    PSA 4.13 01/06/2012    PSA 4.68 12/27/2010     Lab Results   Component Value Date    CR 0.92 09/16/2024    CR 1.01 03/18/2024    CR 1.00 11/03/2023    CR 0.95 11/01/2023    CR 0.95 10/31/2023    CR 1.14 09/15/2023    CR 1.02 03/14/2023    CR 0.94 09/02/2022    CR 0.86  01/21/2022    CR 0.75 01/19/2022    CR 1.02 06/18/2021    CR 1.02 06/04/2021    CR 1.06 06/03/2021    CR 0.94 05/19/2021    CR 1.00 05/18/2021    CR 1.14 05/17/2021    CR 0.98 04/26/2021    CR 0.98 04/25/2021    CR 1.00 04/22/2021    CR 0.95 04/21/2021     Lab Results   Component Value Date    GFRESTIMATED >90 09/16/2024    GFRESTIMATED 82 03/18/2024    GFRESTIMATED 83 11/03/2023    GFRESTIMATED 88 11/01/2023    GFRESTIMATED 88 10/31/2023    GFRESTIMATED 71 09/15/2023    GFRESTIMATED 81 03/14/2023    GFRESTIMATED 90 09/02/2022    GFRESTIMATED >90 01/21/2022    GFRESTIMATED >90 01/19/2022    GFRESTIMATED >60 01/18/2022    GFRESTIMATED 77 06/18/2021    GFRESTIMATED 77 06/04/2021    GFRESTIMATED 74 06/03/2021    GFRESTIMATED 85 05/19/2021    GFRESTIMATED 79 05/18/2021    GFRESTIMATED 67 05/17/2021    GFRESTIMATED 81 04/26/2021    GFRESTIMATED 81 04/25/2021    GFRESTIMATED 79 04/22/2021    GFRESTIMATED 84 04/21/2021          Again, thank you for allowing me to participate in the care of your patient.      Sincerely,    AMARIS Briggs CNP

## 2025-07-29 NOTE — PROGRESS NOTES
Urology Outpatient Visit    Name: Todd S Aschoff    MRN: [id number]   YOB: 1955               Chief Complaint:   ED         Impression and Plan:   Impression / Plan:   Todd S Aschoff is a 69 year old male with BPH Hx Rezum, OAB on Mirabegron, vasculogenic Erectile dysfunction.      Mirabegron refilled.  ED Tx options discussed in detail. He will think on his options regarding ED Tx.   Lab visit today for screening PSA.  Follow-up 1 yr med refill, Sx recheck, PSA monitoring.    Thank you for the opportunity to participate in the care of Todd S Aschoff.     AMARIS Davis, CNP  [facility] - Department of Urology  [phone]          History of Present Illness:   Todd S Aschoff is a 69 year old male with BPH, OAB (over-active bladder), Erectile dysfunction    history of Elevated PSA. Patient had previously followed with Dr. Gonzales and was referred to Dr. Mock for discussion of possible InterStim or Botox for his overactive bladder symptoms. However he was noted to have an elevated PSA. He underwent a MR fusion biopsy which was based on his prior MRI from 2017.  There was no evidence of prostate cancer.     He is on Warfarin secondary to a heart valve     9/22/21:  Urodynamics (UDS) with Purnima Sutherland  POSTPROCEDURE DIAGNOSES:  -Maximum cystometric capacity ~240 mL with heightened filling sensations.  -Good bladder compliance with high pressure detrusor overactivity (Pdet reaches >100 cm H2O at the peak of these uninhibited detrusor contractions) with associated urgency incontinence starting at bladder volumes >150 mL. He leaks with the first episode of DO but is then able to suppress the incontinence and the bladder spasm subsides. During the second episode of DO, he is able to suppress incontinence but expresses imminent desire to void and is thus given permission to void.  -Strong detrusor contraction during voiding reaching 109 cm H2O.   -He voids 221 mL with slow flow (Qmax 12 ml/s) and mild  incomplete bladder emptying (PVR 20 mL). On fluoroscopy, a small volume of contrast is retained within bilateral bladder diverticuli post-void.   -BOOI is 68.4 which is suggestive for bladder outlet obstruction.  -Fluoroscopy otherwise reveals a moderately trabeculated bladder wall without vesicoureteral reflux. The bladder neck initially appears closed during filling and appears open during episodes of incontinence and voiding.      12/1/21:  REZUM     1/12/22:  He is doing very well after the procedure  Stream is slightly weak  Flow is better   Frequency and urgency is much better  Very happy with the results     TODAY - 7/29/25:  Doing well on mirabegron. No urinary concerns. Would like to discuss issues attaining/maintaining erections x 4yr. Concerned about SE profile of PDE5i's.    History is obtained from patient & EMR          Past Medical History:     Past Medical History:   Diagnosis Date    Advanced directives, counseling/discussion 1/6/2012    Discussed Advance Directive planning with patient; information given to patient to review.    Alcohol dependence (H)     Alcohol dependence in remission (H) 8/2/2018    Allergy, unspecified not elsewhere classified     seasonal    Anemia, unspecified type 1/22/2021    Aortic valve prosthesis present 2/8/2021    Atrial fibrillation (H)     Benign prostatic hyperplasia 2/8/2021    Bilateral thoracic back pain 11/16/2016    BPH (benign prostatic hypertrophy)     Chronic atrial fibrillation (H) 8/7/2002    Chronic infection     low back wound incision not healing     Chronic low back pain 8/19/2011    Chronic pain     lower back and right leg and left leg    Depressive disorder 6/9/2010    Depression  NOS    Dissection of aorta, thoracic (H) 1992    [facility] Jose F aotic valve + arch graft 1992    Elevated prostate specific antigen (PSA) 1/22/2020    Family history of prostate cancer 1/22/2020    Generalized muscle weakness 1/22/2021    Headache(784.0)     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 Provider to review    Leg wound, left 1/9/2021    Long term current use of anticoagulant therapy 8/7/2002    LW Modifier:  Coumadin, since mechanical aortic valve LW Onset:  1992 ; Anticoagulant Rx Long Term Problem list name updated by automated process. Provider to review    Low back pain     Lumbar radiculopathy 4/3/2013    Lumbar surgical wound fluid collection 5/23/2013    Major depression     Memory difficulties 1/16/2015    Mixed hyperlipidemia     Morbid obesity (H) 4/20/2010    LW Modifier:  BMI 41.3 (Apr 2010) ; Obesity Morbid    Numbness and tingling     right leg post surg rightt hip/ also left leg since surg    OA (osteoarthritis)     hips    OAB (overactive bladder) 5/11/2015    Obesity, unspecified     Persons encountering health services in other specified circumstances 4/3/2012    Formatting of this note might be different from the original. EMERGENCY CARE PLAN Presenting Problem Signs and Symptoms Treatment Plan   Questions or conerns during clinic hours    I will call the clinic directly    Questions or conerns outside clinic hours    I will call the 24 hour nurse line at [phone]   Patient needs to schedule an appointment    I will call the 24 hour scheduling team at    Polypharmacy 1/22/2021    Prostate infection     Radiculitis 4/20/2010    LW Modifier:  Right foot, s/p R AMANDA LW Onset:  2002 ; Neuralgia    Recurrent major depressive episodes, in full remission 10/30/2012    Renal insufficiency 1/22/2021    Rotator cuff tear 1/26/2015    Rotator cuff tear, right    S/P lumbar fusion 4/5/2018    S/P [employer] 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 Jose F's valve    APPLY WOUND VAC Left 1/26/2021    Procedure: Placement of negative pressure wound therapy 2,400 squared centimeters  ;  Surgeon: Lazaro Plascencia MD;  Location: [facility] OR    CATARACT IOL, RT/LT      rt eye only    CHOLECYSTECTOMY, LAPOROSCOPIC  8/10    CLOSE SECONDARY WOUND LOWER EXTREMITY Left 2/3/2021    Procedure:  Split Thickness Skin Graft to Leg of 18 square centimeters  Intermediate Closure of Leg of 8cm   ;  Surgeon: Lazaro Plascencia MD;  Location:  OR    COLONOSCOPY N/A 1/15/2015    Procedure: COLONOSCOPY;  Surgeon: Johnson Kothari MD;  Location:  GI    COLONOSCOPY N/A 12/10/2024    Procedure: Colonoscopy;  Surgeon: Nory Godoy MD;  Location:  GI    DECOMPRESSION LUMBAR ONE LEVEL  4/3/2013    Procedure: DECOMPRESSION LUMBAR ONE LEVEL;  Open Decompression L3-4 bilateral;  Surgeon: Travis Coffey MD;  Location: [facility] OR    DECOMPRESSION, FUSION CERVICAL ANTERIOR ONE LEVEL, COMBINED  3/23/2012    Procedure:COMBINED DECOMPRESSION, FUSION CERVICAL ANTERIOR ONE LEVEL; Anterior Cervical Decompression and Fusion C4-6; Surgeon:TRAVIS COFFEY; Location:[facility] OR    ELBOW SURGERY      EXPLORE SPINE, REMOVE HARDWARE, COMBINED  5/23/2013    Procedure: COMBINED EXPLORE SPINE, REMOVE HARDWARE;  Exploration Lumbar Wound for fluid collection;  Surgeon: Travis Coffey MD;  Location: [facility] OR    FUSION CERVICAL ANTERIOR TWO LEVELS  3/26/2012    Procedure:FUSION CERVICAL ANTERIOR TWO LEVELS; Anterior Cervical Fusion C4-6, Anterior Cervical  Hematoma Evacuation; Surgeon:ALEXX  TRAVIS TUCKER; Location:[facility] OR    IR LUMBAR EPIDURAL STEROID INJECTION  3/28/2003    IRRIGATION AND DEBRIDEMENT LOWER EXTREMITY, COMBINED Left 1/10/2021    Procedure: 1.  Irrigation and excisional debridement to muscle left distal medial calf chronic wounds total area measuring 9 cm x 4 cm 2.  Irrigation and excisional debridement to fascia left medial calf chronic hematoma measuring 29 x 6.7 x 4.2 cm 3.  Primary complex wound closure left medial distal calf 9 cm in length;  Surgeon: Rod Kemp MD;  Location: [facility] OR    IRRIGATION AND DEBRIDEMENT LOWER EXTREMITY, COMBINED Left 1/26/2021    Procedure: Evacuation of hematoma debridement of skin, subcutaneous tissue and muscle 2,400 squared centimeters, placement of negative pressure wound therapy 2,400 squared centimeters;  Surgeon: Lazaro Plascencia MD;  Location: [facility] OR    IRRIGATION AND DEBRIDEMENT SPINE, CLOSE WOUND, COMBINED  3/26/2012    Procedure:COMBINED IRRIGATION AND DEBRIDEMENT SPINE, CLOSE WOUND; Surgeon:TRAVIS COFFEY; Location:[facility] OR    OTHER SURGICAL HISTORY      ND UNLISTED ORBIT    OTHER SURGICAL HISTORY      ND UNLISTED SPINE    OTHER SURGICAL HISTORY      ND UNLISTED NECK/THORAX    OTHER SURGICAL HISTORY      (IA) ND TOTAL HIP ARTHROPLASTY    OTHER SURGICAL HISTORY      (IA) ND NEE SCOPE MED W LAT MENISCECT WWO DEBRIBE/SHAVE ANY CO    removal of cyst of back   2.5week    TONSILLECTOMY      wisdom teeth[      [facility] NONSPECIFIC PROCEDURE  1992    repair of TAA with graft    [facility] TOTAL HIP ARTHROPLASTY  2010    Left AMANDA    [facility] TOTAL HIP ARTHROPLASTY  2002    R hip replacement comp's by nerve injury with pain down into R leg, nadya below knee          Social History:     Social History     Tobacco Use    Smoking status: Never     Passive exposure: Past    Smokeless tobacco: Never   Substance Use Topics    Alcohol use: No     Comment: Stopped drinking alcohol ~2009          Family History:     Family History   Problem Relation Age of Onset    Cerebrovascular  "Disease Father          age 86, had M.I. ,diabetic also    Prostate Cancer Father     Diabetes Father     Cancer Mother          age 83 of dementia, also h/o lymphoma    Diabetes Mother     Hypertension Brother         2 brothers with hypertension & sleep apnea    Hypertension Sister         Born ~1936    Sleep Apnea Brother          age 78, Alzheimers    No Known Problems Son     No Known Problems Daughter     No Known Problems Son     Family History Negative Brother         Had 5 brothers, three still alive as of     Colon Cancer No family hx of           Allergies:     Allergies   Allergen Reactions    Blood Transfusion Related (Informational Only) Other (See Comments)     Patient has a history of a clinically significant antibody against RBC antigens.  A delay in compatible RBCs may occur.      Ciprofloxacin      \"Went nuts\"    Gabapentin      Severe behavioral disturbances          Medications:     Current Outpatient Medications   Medication Sig Dispense Refill    acetaminophen (TYLENOL) 500 MG tablet Take 1,000 mg by mouth 2 times daily      atorvastatin (LIPITOR) 40 MG tablet Take 1 tablet by mouth every evening 90 tablet 0    azelastine 137 MCG/SPRAY SOLN Spray 1 spray into both nostrils 2 times daily 30 mL 11    bisacodyl (DULCOLAX) 5 MG EC tablet Two days prior to exam take two (2) tablets at 4pm. One day prior to exam take two (2) tablets at 4pm 4 tablet 0    bisacodyl (DULCOLAX) 5 MG EC tablet Two days prior to exam take two (2) tablets at 4pm. One day prior to exam take two (2) tablets at 4pm 4 tablet 0    cyclobenzaprine (FLEXERIL) 10 MG tablet Take 1 tablet by mouth 3 times daily as needed for muscle spasms 270 tablet 0    diazepam (VALIUM) 5 MG tablet Take 1 tablet (5 mg) by mouth nightly as needed for anxiety. 30 tablet 0    donepezil (ARICEPT) 10 MG tablet Take 1 tablet by mouth At Bedtime 90 tablet 3    enoxaparin ANTICOAGULANT (LOVENOX) 120 MG/0.8ML syringe Inject 0.7 mLs (105 mg) " subcutaneously every 12 hours. 22.4 mL 1    folic acid (FOLVITE) 400 MCG tablet Take 400 mcg by mouth daily      guanFACINE (TENEX) 1 MG tablet TAKE ONE TABLET BY MOUTH AT BEDTIME 90 tablet 0    levothyroxine (SYNTHROID/LEVOTHROID) 175 MCG tablet Take 1 tablet by mouth once daily 90 tablet 3    lisinopril (ZESTRIL) 10 MG tablet Take 1 tablet (10 mg) by mouth daily. 90 tablet 3    mirabegron (MYRBETRIQ) 50 MG 24 hr tablet Take 1 tablet by mouth once daily 90 tablet 0    polyethylene glycol (GOLYTELY) 236 g suspension Two days before procedure at 5PM fill first container with water. Mix and drink an 8 oz glass every 15 minutes until HALF of the container is gone. Place the remainder in the refrigerator. One day before procedure at 5PM drink second half of bowel prep. Drink an 8 oz glass every 15 minutes until it is gone. Day of procedure 6 hours before arrival time fill the 2nd container with water. Mix and drink an 8 oz glass every 15 minutes until HALF of the container is gone. Discard the remaining solution. 8000 mL 0    polyethylene glycol (GOLYTELY) 236 g suspension Two days before procedure at 5PM fill first container with water. Mix and drink an 8 oz glass every 15 minutes until HALF of the container is gone. Place the remainder in the refrigerator. One day before procedure at 5PM drink second half of bowel prep. Drink an 8 oz glass every 15 minutes until it is gone. Day of procedure 6 hours before arrival time fill the 2nd container with water. Mix and drink an 8 oz glass every 15 minutes until HALF of the container is gone. Discard the remaining solution. 8000 mL 0    pregabalin (LYRICA) 100 MG capsule Take 1 capsule (100 mg) by mouth 3 times daily. Do not take if sleepy/sedated. 270 capsule 3    propafenone (RYTHMOL) 150 MG TABS tablet Take 1 tablet (150 mg) by mouth every 8 hours. Appointment required for further refills 90 tablet 0    venlafaxine (EFFEXOR XR) 75 MG 24 hr capsule Take 3 capsules by mouth  daily 270 capsule 3    warfarin ANTICOAGULANT (COUMADIN) 2.5 MG tablet Take 0.5 tablets (1.25 mg) by mouth daily. TAKE WITH A 5 MG TABLET 6 TIMES PER WEEK OR PER INR CLINIC 45 tablet 1    warfarin ANTICOAGULANT (COUMADIN) 5 MG tablet Take one 5 mg tablet by mouth every day. Combine with half of 2.5 mg tablet on Mon, Wed, Fri for a total of 6.25 mg. 130 tablet 3     No current facility-administered medications for this visit.          Review of Systems:    ROS: See HPI for pertinent details.  Remainder of 10-point ROS negative.         Physical Exam:   VS:  T: Data Unavailable    HR: Data Unavailable    BP: Data Unavailable    RR: Data Unavailable     GEN:  Alert.  NAD.   HEENT:  Sclerae anicteric.    CV:  No obvious jugular venous distension.  LUNGS: No respiratory distress, breathing comfortably wo accessory muscle use.  ABD:  ND.     SKIN:  Dry. No visible rashes.   NEURO:  CN grossly intact.          Laboratory Data:     Lab Results   Component Value Date    PSA 2.16 09/16/2024    PSA 2.70 03/13/2024    PSA 4.80 07/26/2021    PSA 6.93 01/14/2020    PSA 6.28 10/29/2019    PSA 3.84 06/12/2018    PSA 5.84 02/22/2017    PSA 4.57 01/08/2016    PSA 3.37 12/24/2014    PSA 3.70 12/17/2013    PSA 4.08 10/31/2012    PSA 4.13 01/06/2012    PSA 4.68 12/27/2010     Lab Results   Component Value Date    CR 0.92 09/16/2024    CR 1.01 03/18/2024    CR 1.00 11/03/2023    CR 0.95 11/01/2023    CR 0.95 10/31/2023    CR 1.14 09/15/2023    CR 1.02 03/14/2023    CR 0.94 09/02/2022    CR 0.86 01/21/2022    CR 0.75 01/19/2022    CR 1.02 06/18/2021    CR 1.02 06/04/2021    CR 1.06 06/03/2021    CR 0.94 05/19/2021    CR 1.00 05/18/2021    CR 1.14 05/17/2021    CR 0.98 04/26/2021    CR 0.98 04/25/2021    CR 1.00 04/22/2021    CR 0.95 04/21/2021     Lab Results   Component Value Date    GFRESTIMATED >90 09/16/2024    GFRESTIMATED 82 03/18/2024    GFRESTIMATED 83 11/03/2023    GFRESTIMATED 88 11/01/2023    GFRESTIMATED 88 10/31/2023     GFRESTIMATED 71 09/15/2023    GFRESTIMATED 81 03/14/2023    GFRESTIMATED 90 09/02/2022    GFRESTIMATED >90 01/21/2022    GFRESTIMATED >90 01/19/2022    GFRESTIMATED >60 01/18/2022    GFRESTIMATED 77 06/18/2021    GFRESTIMATED 77 06/04/2021    GFRESTIMATED 74 06/03/2021    GFRESTIMATED 85 05/19/2021    GFRESTIMATED 79 05/18/2021    GFRESTIMATED 67 05/17/2021    GFRESTIMATED 81 04/26/2021    GFRESTIMATED 81 04/25/2021    GFRESTIMATED 79 04/22/2021    GFRESTIMATED 84 04/21/2021

## 2025-07-29 NOTE — PATIENT INSTRUCTIONS
Causes of Erectile Dysfunction    Erectile dysfunction has many possible causes and can be the first symptom of an undiagnosed condition. Erections are caused by the balance of blood flow into and out of the penis. Conditions that result in changes in the penis  blood flow are common causes of ED. The 2 most common medical problems that may cause ED are atherosclerosis (hardening of the arteries) and diabetes. Obesity is also associated with both blood vessel changes and hormone changes that negatively affect erections. Another cause of ED is damage to the nerves involved in getting erections. This can happen with diseases of the nervous system (eg, multiple sclerosis, Parkinson disease) or with surgery (eg, for prostate cancer). Hormone problems, like low testosterone, and side effects of medications can also cause ED.    The mind and body work together when an erection occurs, so emotional or psychological factors can also cause difficulty getting or keeping an erection.      Treatments for Erectile Dysfunction    Depending upon the cause of ED, treatment may include one or more of the following:    Lifestyle changes -- Improving diet, exercise, and sleep and reducing stress can all potentially improve sexual problems such as ED and low libido.    Drugs and alcohol -- Ask your doctor if one of your medications could be contributing to your ED. In some cases, there are different medications you could use instead. Quitting smoking and reducing or stopping alcohol can also be beneficial. If you are having trouble quitting smoking or cutting back on alcohol, your doctor can help.    Phosphodiesterase-5 inhibitors -- Phosphodiesterase-5 (PDE-5) inhibitors work by increasing chemicals that allow the penis to become and remain erect. PDE-5 inhibitors open the blood vessels in the penis and allow more blood flow to come into the penis. They help a male to achieve an erection after sexual stimulation, but the  "medication does not increase sexual desire. These medications require sexual stimulation to cause an erection.    Penile self-injection -- With penile self-injection, the person injects a medication into the corpora cavernosa (the two chambers of the penis that are filled with spongy tissue and blood). This causes an erection by allowing the blood vessels within the penis to expand so that the penis first swells and then stiffens to create a fully rigid erection. The erection created by penile injection occurs without sexual stimulation (different from the erection that occurs after taking sildenafil, vardenafil, or tadalafil). Side effects -- Pain is the most common side effect. Some males can develop scar tissue in the penis.  There is also a small risk that the penis will remain erect after intercourse. Prolonged erection, called \"priapism,\" that lasts longer than four hours is a medical emergency. Contact your health care provider immediately if this happens. An emergency procedure must be done as soon as possible to empty the blood that is trapped in the penis. An erection that lasts longer than 48 hours often results in scarring of the tissue inside the penis.    OTC Eroxon Gel    Vacuum-assisted erection devices -- There are several products on the market that involve placing the penis in a plastic cylinder and creating a vacuum around the penis. This increases blood flow into the penis. A rigid ring is placed at the base of the penis (near the body) to hold the blood inside the penis, allowing it to remain erect. Vacuum devices successfully create erections in as many as 67 percent of cases. Satisfaction with vacuum-assisted erections varies between 25 and 49 percent. Vacuum-assisted devices require that males be able to hold and pump the unit. It may take a week or more for the device to work effectively. After a male is accustomed to using the device, he can usually create an erection that is rigid enough " for penetration and sexual intercourse. He may not be able to ejaculate because the ring that holds blood in the penis also compresses the urethra, preventing semen from exiting. The ability to have an orgasm is not affected by the ring.    Penile prostheses -- A penile prosthesis is a device that is surgically implanted and inflates to allow the penis to become erect. Penile prostheses can be semi-rigid rods or inflatable cylinders that are inserted into the corpora cavernosa. Penile prostheses are used less frequently because of the popularity of PDE-5 inhibitors and penile injection therapies. For males who do not respond to these therapies or who find vacuum erection therapy ineffective, penile prostheses are an option. Side effects -- Side effects of prosthetic devices include the possibility of infection, erosion, pain, and mechanical failure. Mechanical failure may require surgically removing the prosthesis and implanting a new one.     Psychotherapy and psychoactive medications -- Depression and anxiety can cause ED. Often these problems can be treated using psychological counseling, antidepressant drugs, or both. If you are struggling with performance anxiety, your health care provider may refer you to a certified sexual therapy counselor.     What is Cialis (tadalafil) and why is it prescribed?    Cialis (tadalafil) is a medication used to treat erectile dysfunction (ED), symptoms of benign prostatic hyperplasia (BPH), or both. Cialis helps relax muscles in the prostate and bladder, making it easier to urinate, and also improves blood flow to help with erections.    How should Cialis be taken?   For BPH or both BPH and ED: Take 5 mg once daily, at the same time each day.   For ED alone: It can be taken as needed (10 mg before sexual activity, not more than once daily) or as a daily dose (5 mg once daily).   Cialis can be taken with or without food.    What benefits can be expected?   Improvement in urinary  symptoms (such as frequency, urgency, and weak stream) may be noticed within 1-4 weeks of starting treatment.   For ED, improved ability to achieve and maintain an erection may be seen as early as the first dose.    What are the most common side effects?   Headache   Indigestion or upset stomach   Back pain or muscle aches   Nasal congestion   Flushing (warmth or redness in the face)   Dizziness  These side effects are usually mild and go away on their own.    When to seek medical attention:   If you have an erection lasting more than 4 hours (priapism), seek emergency care right away. This can cause permanent damage if not treated.   If you experience sudden vision loss in one or both eyes, or sudden hearing decrease or loss, stop taking Cialis and get medical help immediately.   If you feel chest pain, faint, or have symptoms of a heart problem during sexual activity, stop and seek emergency help.    Important safety information:   Do not take Cialis with nitrates (medications for chest pain) or guanylate cyclase stimulators, as this can cause a dangerous drop in blood pressure.   Use caution if you are taking medications for high blood pressure or alpha-blockers (used for prostate or blood pressure problems), as combining these with Cialis can also lower your blood pressure too much and cause dizziness or fainting.   Limit alcohol intake, as drinking substantial amounts (5 or more drinks) with Cialis can increase the risk of low blood pressure, dizziness, and headache.    Summary:  Cialis is an effective and generally well-tolerated treatment for BPH and/or ED. Take it exactly as prescribed, be aware of possible side effects, and know when to seek medical attention. Always discuss any concerns or new symptoms with your healthcare provider.

## 2025-07-30 LAB — PSA SERPL DL<=0.01 NG/ML-MCNC: 2.17 NG/ML (ref 0–4.5)

## 2025-08-26 ENCOUNTER — LAB (OUTPATIENT)
Dept: LAB | Facility: CLINIC | Age: 70
End: 2025-08-26
Payer: COMMERCIAL

## 2025-08-26 ENCOUNTER — ANTICOAGULATION THERAPY VISIT (OUTPATIENT)
Dept: ANTICOAGULATION | Facility: CLINIC | Age: 70
End: 2025-08-26

## 2025-08-26 DIAGNOSIS — Z95.2 AORTIC VALVE PROSTHESIS PRESENT: ICD-10-CM

## 2025-08-26 DIAGNOSIS — I48.0 PAF (PAROXYSMAL ATRIAL FIBRILLATION) (H): Primary | ICD-10-CM

## 2025-08-26 DIAGNOSIS — I48.0 PAF (PAROXYSMAL ATRIAL FIBRILLATION) (H): ICD-10-CM

## 2025-08-26 LAB — INR BLD: 4.2 (ref 0.9–1.1)

## 2025-08-26 PROCEDURE — 36416 COLLJ CAPILLARY BLOOD SPEC: CPT

## 2025-08-26 PROCEDURE — 85610 PROTHROMBIN TIME: CPT

## 2025-08-27 DIAGNOSIS — M62.830 BACK MUSCLE SPASM: ICD-10-CM

## 2025-08-27 RX ORDER — DIAZEPAM 5 MG/1
5 TABLET ORAL
Qty: 30 TABLET | Refills: 0 | Status: SHIPPED | OUTPATIENT
Start: 2025-08-27

## (undated) DEVICE — SET HANDPIECE INTERPULSE W/COAXIAL FAN SPRAY TIP 0210118000

## (undated) DEVICE — LINEN HALF SHEET 5512

## (undated) DEVICE — DRSG AQUACEL AG 3.5X6.0" HYDROFIBER 412010

## (undated) DEVICE — SPECIMEN CULTURETTE DBL SWAB 220109

## (undated) DEVICE — PREP SKIN SCRUB TRAY 4461A

## (undated) DEVICE — PACK MINOR CUSTOM RIDGES SBA32RMRMA

## (undated) DEVICE — SU ETHILON 2-0 PS 18" 585H

## (undated) DEVICE — TUBE CULTURE AEROBIC/ANAEROBIC W/O SWABS A.C.T.I.1. 12401

## (undated) DEVICE — Device

## (undated) DEVICE — GOWN LG DISP 9515

## (undated) DEVICE — SU VICRYL 3-0 SH 27" UND J416H

## (undated) DEVICE — DRSG GAUZE 4X4" TRAY

## (undated) DEVICE — PACK LOWER EXTREMITY RIDGES

## (undated) DEVICE — DRSG GAUZE 4X4" 8044

## (undated) DEVICE — GLOVE PROTEXIS POWDER FREE SMT 7.5  2D72PT75X

## (undated) DEVICE — PREP POVIDONE IODINE SOLUTION 10% 4OZ

## (undated) DEVICE — SU CHROMIC 4-0 RB-1 27" U203H

## (undated) DEVICE — ESU GROUND PAD ADULT W/CORD E7507

## (undated) DEVICE — PREP POVIDONE-IODINE 7.5% SCRUB 4OZ BOTTLE MDS093945

## (undated) DEVICE — LINEN FULL SHEET 5511

## (undated) DEVICE — DRSG WOUND VAC SPONGE MED BLACK M8275052/5

## (undated) DEVICE — SOL NACL 0.9% IRRIG 3000ML BAG 2B7477

## (undated) DEVICE — PACK TOTAL KNEE BOXED LATEX FREE PO15TKFCT

## (undated) DEVICE — PREP POVIDONE IODINE SOLUTION 10% 4OZ BOTTLE 29906-004

## (undated) DEVICE — DRAPE CONVERTORS U-DRAPE 60X72" 8476

## (undated) DEVICE — GLOVE PROTEXIS POWDER FREE 7.0 ORTHOPEDIC 2D73ET70

## (undated) DEVICE — SUCTION TIP YANKAUER W/O VENT K86

## (undated) DEVICE — GLOVE PROTEXIS POWDER FREE 8.0 ORTHOPEDIC 2D73ET80

## (undated) DEVICE — BNDG ELASTIC 4" DBL LENGTH UNSTERILE 6611-14

## (undated) DEVICE — SYR BULB IRRIG DOVER 60 ML LATEX FREE 67000

## (undated) DEVICE — TOURNIQUET SGL  BLADDER 30"X4" BLUE 5921030135

## (undated) DEVICE — BAG CLEAR TRASH 1.3M 39X33" P4040C

## (undated) DEVICE — DRSG TEGADERM 4X4 3/4" 1626W

## (undated) DEVICE — DRAPE IOBAN INCISE 23X17" 6650EZ

## (undated) DEVICE — SU VICRYL 0 CT-2 CR 8X18" J727D

## (undated) DEVICE — DRAPE LAP PEDS DISP 29492

## (undated) DEVICE — DERMACARRIER 1.5:1 ZIMMER 00-2195-012-00

## (undated) DEVICE — TUBING SUCTION 12"X1/4" N612

## (undated) DEVICE — SOL ADH LIQUID BENZOIN SWAB 0.6ML C1544

## (undated) DEVICE — GLOVE PROTEXIS BLUE W/NEU-THERA 7.0  2D73EB70

## (undated) DEVICE — DRSG ADAPTIC 3X8" 6113

## (undated) DEVICE — DRSG XEROFORM 5X9" 8884431605

## (undated) DEVICE — SUCTION MANIFOLD NEPTUNE 2 SYS 4 PORT 0702-020-000

## (undated) DEVICE — SOL NACL 0.9% IRRIG 1000ML BOTTLE 2F7124

## (undated) DEVICE — GLOVE PROTEXIS W/NEU-THERA 7.5  2D73TE75

## (undated) DEVICE — DRSG WOUND VAC INSTILL PAD

## (undated) DEVICE — STPL SKIN 35W 6.9MM  PXW35

## (undated) DEVICE — DRAPE STERI U 1015

## (undated) DEVICE — PREP POVIDONE IODINE SCRUB 7.5% 120ML

## (undated) DEVICE — LABEL MEDICATION SYSTEM 3303-P

## (undated) DEVICE — DRAIN HEMOVAC RESERVOIR KIT 10FR 1/8" MED 00-2550-002-10

## (undated) DEVICE — SUTURE VICRYL+ 0 CT-1 18" DYED VIO VCP740D

## (undated) DEVICE — GLOVE PROTEXIS POWDER FREE 6.5 ORTHOPEDIC 2D73ET65

## (undated) DEVICE — SOL NACL 0.9% IRRIG 1000ML BOTTLE 07138-09

## (undated) DEVICE — DRSG XEROFORM 1X8"

## (undated) DEVICE — GLOVE PROTEXIS BLUE W/NEU-THERA 8.0  2D73EB80

## (undated) DEVICE — LINEN TOWEL PACK X10 5473

## (undated) DEVICE — PREP POVIDONE IODINE SOLUTION 10% 120ML

## (undated) DEVICE — DRSG KERLIX 4 1/2"X4YDS ROLL 6730

## (undated) DEVICE — CAST PADDING 6" UNSTERILE 9046

## (undated) DEVICE — APPLICATORS COTTON TIP 6"X2 STERILE LF C15053-006

## (undated) DEVICE — DRAPE SHOULDER PACK SPLITS 29365

## (undated) DEVICE — BNDG COTTON ROLL 12 1/2X56" UNSTERILE 2287

## (undated) DEVICE — BLADE DERMATOME ZIMMER  00-8800-000-10

## (undated) DEVICE — BNDG ELASTIC 4"X5YDS UNSTERILE 6611-40

## (undated) DEVICE — TUBING IRRIG CYSTO/BLADDER SET 81" LF 2C4040

## (undated) DEVICE — SYR BULB IRRIG 50ML LATEX FREE 0035280

## (undated) DEVICE — DRSG ABDOMINAL 07 1/2X8" 7197D

## (undated) DEVICE — DRAPE EXTREMITY W/ARMBOARD 29405

## (undated) DEVICE — PITCHER STERILE 1000ML  SSK9004A

## (undated) DEVICE — CAST PADDING 6" STERILE 9046S

## (undated) DEVICE — BLADE KNIFE SURG 10 371110

## (undated) DEVICE — SPONGE LAP 18X18" X8435

## (undated) DEVICE — CANISTER WOUND VAC W/GEL 500ML M8275063/5

## (undated) RX ORDER — GLYCOPYRROLATE 0.2 MG/ML
INJECTION INTRAMUSCULAR; INTRAVENOUS
Status: DISPENSED
Start: 2021-01-26

## (undated) RX ORDER — FENTANYL CITRATE-0.9 % NACL/PF 10 MCG/ML
PLASTIC BAG, INJECTION (ML) INTRAVENOUS
Status: DISPENSED
Start: 2021-01-26

## (undated) RX ORDER — LIDOCAINE HYDROCHLORIDE AND EPINEPHRINE 10; 10 MG/ML; UG/ML
INJECTION, SOLUTION INFILTRATION; PERINEURAL
Status: DISPENSED
Start: 2021-02-03

## (undated) RX ORDER — FENTANYL CITRATE 50 UG/ML
INJECTION, SOLUTION INTRAMUSCULAR; INTRAVENOUS
Status: DISPENSED
Start: 2021-02-03

## (undated) RX ORDER — DEXAMETHASONE SODIUM PHOSPHATE 4 MG/ML
INJECTION, SOLUTION INTRA-ARTICULAR; INTRALESIONAL; INTRAMUSCULAR; INTRAVENOUS; SOFT TISSUE
Status: DISPENSED
Start: 2021-02-03

## (undated) RX ORDER — FENTANYL CITRATE 50 UG/ML
INJECTION, SOLUTION INTRAMUSCULAR; INTRAVENOUS
Status: DISPENSED
Start: 2021-01-10

## (undated) RX ORDER — CEFAZOLIN SODIUM IN 0.9 % NACL 3 G/100 ML
INTRAVENOUS SOLUTION, PIGGYBACK (ML) INTRAVENOUS
Status: DISPENSED
Start: 2021-02-03

## (undated) RX ORDER — ONDANSETRON 2 MG/ML
INJECTION INTRAMUSCULAR; INTRAVENOUS
Status: DISPENSED
Start: 2021-02-03

## (undated) RX ORDER — LIDOCAINE HYDROCHLORIDE 10 MG/ML
INJECTION, SOLUTION EPIDURAL; INFILTRATION; INTRACAUDAL; PERINEURAL
Status: DISPENSED
Start: 2021-02-03

## (undated) RX ORDER — PROPOFOL 10 MG/ML
INJECTION, EMULSION INTRAVENOUS
Status: DISPENSED
Start: 2021-01-26

## (undated) RX ORDER — GLYCOPYRROLATE 0.2 MG/ML
INJECTION INTRAMUSCULAR; INTRAVENOUS
Status: DISPENSED
Start: 2021-01-10

## (undated) RX ORDER — ONDANSETRON 2 MG/ML
INJECTION INTRAMUSCULAR; INTRAVENOUS
Status: DISPENSED
Start: 2021-01-10

## (undated) RX ORDER — EPHEDRINE SULFATE 50 MG/ML
INJECTION, SOLUTION INTRAMUSCULAR; INTRAVENOUS; SUBCUTANEOUS
Status: DISPENSED
Start: 2021-02-03

## (undated) RX ORDER — SULFAMETHOXAZOLE/TRIMETHOPRIM 800-160 MG
TABLET ORAL
Status: DISPENSED
Start: 2021-09-22

## (undated) RX ORDER — LIDOCAINE HYDROCHLORIDE 10 MG/ML
INJECTION, SOLUTION EPIDURAL; INFILTRATION; INTRACAUDAL; PERINEURAL
Status: DISPENSED
Start: 2021-01-10

## (undated) RX ORDER — GLYCOPYRROLATE 0.2 MG/ML
INJECTION INTRAMUSCULAR; INTRAVENOUS
Status: DISPENSED
Start: 2021-02-03

## (undated) RX ORDER — FENTANYL CITRATE 50 UG/ML
INJECTION, SOLUTION INTRAMUSCULAR; INTRAVENOUS
Status: DISPENSED
Start: 2021-01-26

## (undated) RX ORDER — EPHEDRINE SULFATE 50 MG/ML
INJECTION, SOLUTION INTRAMUSCULAR; INTRAVENOUS; SUBCUTANEOUS
Status: DISPENSED
Start: 2021-01-26

## (undated) RX ORDER — FENTANYL CITRATE 50 UG/ML
INJECTION, SOLUTION INTRAMUSCULAR; INTRAVENOUS
Status: DISPENSED
Start: 2024-12-10

## (undated) RX ORDER — HYDROMORPHONE HYDROCHLORIDE 1 MG/ML
INJECTION, SOLUTION INTRAMUSCULAR; INTRAVENOUS; SUBCUTANEOUS
Status: DISPENSED
Start: 2021-01-26

## (undated) RX ORDER — HYDROMORPHONE HYDROCHLORIDE 1 MG/ML
INJECTION, SOLUTION INTRAMUSCULAR; INTRAVENOUS; SUBCUTANEOUS
Status: DISPENSED
Start: 2021-02-03

## (undated) RX ORDER — DEXAMETHASONE SODIUM PHOSPHATE 4 MG/ML
INJECTION, SOLUTION INTRA-ARTICULAR; INTRALESIONAL; INTRAMUSCULAR; INTRAVENOUS; SOFT TISSUE
Status: DISPENSED
Start: 2021-01-10

## (undated) RX ORDER — HYDROMORPHONE HYDROCHLORIDE 1 MG/ML
INJECTION, SOLUTION INTRAMUSCULAR; INTRAVENOUS; SUBCUTANEOUS
Status: DISPENSED
Start: 2021-01-10

## (undated) RX ORDER — MINERAL OIL
OIL (ML) MISCELLANEOUS
Status: DISPENSED
Start: 2021-02-03

## (undated) RX ORDER — PROPOFOL 10 MG/ML
INJECTION, EMULSION INTRAVENOUS
Status: DISPENSED
Start: 2021-02-03

## (undated) RX ORDER — PROPOFOL 10 MG/ML
INJECTION, EMULSION INTRAVENOUS
Status: DISPENSED
Start: 2021-01-10

## (undated) RX ORDER — DEXAMETHASONE SODIUM PHOSPHATE 4 MG/ML
INJECTION, SOLUTION INTRA-ARTICULAR; INTRALESIONAL; INTRAMUSCULAR; INTRAVENOUS; SOFT TISSUE
Status: DISPENSED
Start: 2021-01-26

## (undated) RX ORDER — CEFAZOLIN SODIUM IN 0.9 % NACL 3 G/100 ML
INTRAVENOUS SOLUTION, PIGGYBACK (ML) INTRAVENOUS
Status: DISPENSED
Start: 2021-01-10

## (undated) RX ORDER — GINSENG 100 MG
CAPSULE ORAL
Status: DISPENSED
Start: 2021-02-03

## (undated) RX ORDER — LIDOCAINE HYDROCHLORIDE AND EPINEPHRINE 10; 10 MG/ML; UG/ML
INJECTION, SOLUTION INFILTRATION; PERINEURAL
Status: DISPENSED
Start: 2021-01-26

## (undated) RX ORDER — LIDOCAINE HYDROCHLORIDE 10 MG/ML
INJECTION, SOLUTION EPIDURAL; INFILTRATION; INTRACAUDAL; PERINEURAL
Status: DISPENSED
Start: 2021-01-26

## (undated) RX ORDER — TRANEXAMIC ACID 10 MG/ML
INJECTION, SOLUTION INTRAVENOUS
Status: DISPENSED
Start: 2021-01-26